# Patient Record
Sex: MALE | Race: WHITE | NOT HISPANIC OR LATINO | Employment: OTHER | URBAN - METROPOLITAN AREA
[De-identification: names, ages, dates, MRNs, and addresses within clinical notes are randomized per-mention and may not be internally consistent; named-entity substitution may affect disease eponyms.]

---

## 2017-01-29 ENCOUNTER — HOSPITAL ENCOUNTER (INPATIENT)
Facility: CLINIC | Age: 82
LOS: 3 days | Discharge: HOME | DRG: 193 | End: 2017-02-01
Attending: EMERGENCY MEDICINE | Admitting: HOSPITALIST
Payer: MEDICARE

## 2017-01-29 ENCOUNTER — APPOINTMENT (OUTPATIENT)
Dept: GENERAL RADIOLOGY | Facility: CLINIC | Age: 82
DRG: 193 | End: 2017-01-29
Attending: EMERGENCY MEDICINE
Payer: MEDICARE

## 2017-01-29 DIAGNOSIS — J18.9 PNEUMONIA DUE TO INFECTIOUS ORGANISM, UNSPECIFIED LATERALITY, UNSPECIFIED PART OF LUNG: ICD-10-CM

## 2017-01-29 DIAGNOSIS — J11.1 INFLUENZA: ICD-10-CM

## 2017-01-29 DIAGNOSIS — R09.02 HYPOXIA: ICD-10-CM

## 2017-01-29 PROBLEM — J09.X1 INFLUENZA A WITH PNEUMONIA: Status: ACTIVE | Noted: 2017-01-29

## 2017-01-29 PROBLEM — J10.1 INFLUENZA A: Status: ACTIVE | Noted: 2017-01-29

## 2017-01-29 PROBLEM — R06.02 SHORTNESS OF BREATH: Status: ACTIVE | Noted: 2017-01-29

## 2017-01-29 LAB
ANION GAP SERPL CALCULATED.3IONS-SCNC: 8 MMOL/L (ref 3–14)
BUN SERPL-MCNC: 23 MG/DL (ref 7–30)
CALCIUM SERPL-MCNC: 9 MG/DL (ref 8.5–10.1)
CHLORIDE SERPL-SCNC: 108 MMOL/L (ref 94–109)
CO2 BLDCOV-SCNC: 27 MMOL/L (ref 21–28)
CO2 SERPL-SCNC: 24 MMOL/L (ref 20–32)
CREAT SERPL-MCNC: 1.19 MG/DL (ref 0.66–1.25)
ERYTHROCYTE [DISTWIDTH] IN BLOOD BY AUTOMATED COUNT: 16.9 % (ref 10–15)
FLUAV+FLUBV AG SPEC QL: ABNORMAL
FLUAV+FLUBV AG SPEC QL: POSITIVE
GFR SERPL CREATININE-BSD FRML MDRD: 59 ML/MIN/1.7M2
GLUCOSE SERPL-MCNC: 104 MG/DL (ref 70–99)
HCT VFR BLD AUTO: 37.6 % (ref 40–53)
HGB BLD-MCNC: 11.9 G/DL (ref 13.3–17.7)
LACTATE BLD-SCNC: 1.4 MMOL/L (ref 0.7–2.1)
MCH RBC QN AUTO: 20.7 PG (ref 26.5–33)
MCHC RBC AUTO-ENTMCNC: 31.6 G/DL (ref 31.5–36.5)
MCV RBC AUTO: 65 FL (ref 78–100)
NT-PROBNP SERPL-MCNC: 4211 PG/ML (ref 0–1800)
PCO2 BLDV: 49 MM HG (ref 40–50)
PH BLDV: 7.34 PH (ref 7.32–7.43)
PLATELET # BLD AUTO: 97 10E9/L (ref 150–450)
PO2 BLDV: 43 MM HG (ref 25–47)
POTASSIUM SERPL-SCNC: 4.5 MMOL/L (ref 3.4–5.3)
RBC # BLD AUTO: 5.75 10E12/L (ref 4.4–5.9)
SAO2 % BLDV FROM PO2: 75 %
SODIUM SERPL-SCNC: 140 MMOL/L (ref 133–144)
SPECIMEN SOURCE: ABNORMAL
WBC # BLD AUTO: 5.4 10E9/L (ref 4–11)

## 2017-01-29 PROCEDURE — 85027 COMPLETE CBC AUTOMATED: CPT | Performed by: EMERGENCY MEDICINE

## 2017-01-29 PROCEDURE — 93005 ELECTROCARDIOGRAM TRACING: CPT

## 2017-01-29 PROCEDURE — 87040 BLOOD CULTURE FOR BACTERIA: CPT | Performed by: EMERGENCY MEDICINE

## 2017-01-29 PROCEDURE — 71020 XR CHEST 2 VW: CPT

## 2017-01-29 PROCEDURE — 80048 BASIC METABOLIC PNL TOTAL CA: CPT | Performed by: EMERGENCY MEDICINE

## 2017-01-29 PROCEDURE — 25000125 ZZHC RX 250: Performed by: EMERGENCY MEDICINE

## 2017-01-29 PROCEDURE — 12000000 ZZH R&B MED SURG/OB

## 2017-01-29 PROCEDURE — 25000132 ZZH RX MED GY IP 250 OP 250 PS 637: Performed by: EMERGENCY MEDICINE

## 2017-01-29 PROCEDURE — 99285 EMERGENCY DEPT VISIT HI MDM: CPT | Mod: 25

## 2017-01-29 PROCEDURE — 87804 INFLUENZA ASSAY W/OPTIC: CPT | Performed by: EMERGENCY MEDICINE

## 2017-01-29 PROCEDURE — 96374 THER/PROPH/DIAG INJ IV PUSH: CPT

## 2017-01-29 PROCEDURE — 25000132 ZZH RX MED GY IP 250 OP 250 PS 637: Performed by: HOSPITALIST

## 2017-01-29 PROCEDURE — 94640 AIRWAY INHALATION TREATMENT: CPT

## 2017-01-29 PROCEDURE — 40000274 ZZH STATISTIC RCP CONSULT EA 30 MIN

## 2017-01-29 PROCEDURE — 40000275 ZZH STATISTIC RCP TIME EA 10 MIN

## 2017-01-29 PROCEDURE — 96375 TX/PRO/DX INJ NEW DRUG ADDON: CPT

## 2017-01-29 PROCEDURE — 82803 BLOOD GASES ANY COMBINATION: CPT

## 2017-01-29 PROCEDURE — 25000128 H RX IP 250 OP 636: Performed by: EMERGENCY MEDICINE

## 2017-01-29 PROCEDURE — 83605 ASSAY OF LACTIC ACID: CPT

## 2017-01-29 PROCEDURE — 25000125 ZZHC RX 250: Performed by: HOSPITALIST

## 2017-01-29 PROCEDURE — 99223 1ST HOSP IP/OBS HIGH 75: CPT | Mod: AI | Performed by: HOSPITALIST

## 2017-01-29 PROCEDURE — 83880 ASSAY OF NATRIURETIC PEPTIDE: CPT | Performed by: EMERGENCY MEDICINE

## 2017-01-29 RX ORDER — ONDANSETRON 4 MG/1
4 TABLET, ORALLY DISINTEGRATING ORAL EVERY 6 HOURS PRN
Status: DISCONTINUED | OUTPATIENT
Start: 2017-01-29 | End: 2017-02-01 | Stop reason: HOSPADM

## 2017-01-29 RX ORDER — LISINOPRIL 20 MG/1
20 TABLET ORAL DAILY
Status: DISCONTINUED | OUTPATIENT
Start: 2017-01-30 | End: 2017-01-30

## 2017-01-29 RX ORDER — PROCHLORPERAZINE 25 MG
12.5 SUPPOSITORY, RECTAL RECTAL EVERY 12 HOURS PRN
Status: DISCONTINUED | OUTPATIENT
Start: 2017-01-29 | End: 2017-02-01 | Stop reason: HOSPADM

## 2017-01-29 RX ORDER — NALOXONE HYDROCHLORIDE 0.4 MG/ML
.1-.4 INJECTION, SOLUTION INTRAMUSCULAR; INTRAVENOUS; SUBCUTANEOUS
Status: DISCONTINUED | OUTPATIENT
Start: 2017-01-29 | End: 2017-01-30

## 2017-01-29 RX ORDER — IPRATROPIUM BROMIDE AND ALBUTEROL SULFATE 2.5; .5 MG/3ML; MG/3ML
3 SOLUTION RESPIRATORY (INHALATION) ONCE
Status: COMPLETED | OUTPATIENT
Start: 2017-01-29 | End: 2017-01-29

## 2017-01-29 RX ORDER — ONDANSETRON 2 MG/ML
4 INJECTION INTRAMUSCULAR; INTRAVENOUS EVERY 6 HOURS PRN
Status: DISCONTINUED | OUTPATIENT
Start: 2017-01-29 | End: 2017-02-01 | Stop reason: HOSPADM

## 2017-01-29 RX ORDER — PROCHLORPERAZINE MALEATE 5 MG
5 TABLET ORAL EVERY 6 HOURS PRN
Status: DISCONTINUED | OUTPATIENT
Start: 2017-01-29 | End: 2017-02-01 | Stop reason: HOSPADM

## 2017-01-29 RX ORDER — METOPROLOL TARTRATE 25 MG/1
25 TABLET, FILM COATED ORAL 2 TIMES DAILY
Status: DISCONTINUED | OUTPATIENT
Start: 2017-01-29 | End: 2017-02-01 | Stop reason: HOSPADM

## 2017-01-29 RX ORDER — OSELTAMIVIR PHOSPHATE 30 MG/1
30 CAPSULE ORAL 2 TIMES DAILY
Status: DISCONTINUED | OUTPATIENT
Start: 2017-01-30 | End: 2017-02-01 | Stop reason: HOSPADM

## 2017-01-29 RX ORDER — AMOXICILLIN 250 MG
1-2 CAPSULE ORAL 2 TIMES DAILY PRN
Status: DISCONTINUED | OUTPATIENT
Start: 2017-01-29 | End: 2017-02-01 | Stop reason: HOSPADM

## 2017-01-29 RX ORDER — BISACODYL 10 MG
10 SUPPOSITORY, RECTAL RECTAL DAILY PRN
Status: DISCONTINUED | OUTPATIENT
Start: 2017-01-29 | End: 2017-02-01 | Stop reason: HOSPADM

## 2017-01-29 RX ORDER — METHYLPREDNISOLONE SODIUM SUCCINATE 125 MG/2ML
125 INJECTION, POWDER, LYOPHILIZED, FOR SOLUTION INTRAMUSCULAR; INTRAVENOUS ONCE
Status: COMPLETED | OUTPATIENT
Start: 2017-01-29 | End: 2017-01-29

## 2017-01-29 RX ORDER — FERROUS SULFATE 325(65) MG
325 TABLET ORAL DAILY
Status: DISCONTINUED | OUTPATIENT
Start: 2017-01-30 | End: 2017-02-01 | Stop reason: HOSPADM

## 2017-01-29 RX ORDER — LEVOFLOXACIN 5 MG/ML
750 INJECTION, SOLUTION INTRAVENOUS ONCE
Status: COMPLETED | OUTPATIENT
Start: 2017-01-29 | End: 2017-01-29

## 2017-01-29 RX ORDER — BUDESONIDE 0.5 MG/2ML
0.5 INHALANT ORAL 2 TIMES DAILY
Status: DISCONTINUED | OUTPATIENT
Start: 2017-01-29 | End: 2017-02-01 | Stop reason: HOSPADM

## 2017-01-29 RX ORDER — IPRATROPIUM BROMIDE AND ALBUTEROL SULFATE 2.5; .5 MG/3ML; MG/3ML
3 SOLUTION RESPIRATORY (INHALATION)
Status: DISCONTINUED | OUTPATIENT
Start: 2017-01-29 | End: 2017-02-01 | Stop reason: HOSPADM

## 2017-01-29 RX ORDER — ASPIRIN 325 MG
325 TABLET ORAL DAILY
Status: DISCONTINUED | OUTPATIENT
Start: 2017-01-30 | End: 2017-02-01 | Stop reason: HOSPADM

## 2017-01-29 RX ORDER — LEVOFLOXACIN 5 MG/ML
500 INJECTION, SOLUTION INTRAVENOUS EVERY 24 HOURS
Status: DISCONTINUED | OUTPATIENT
Start: 2017-01-30 | End: 2017-01-30

## 2017-01-29 RX ORDER — OSELTAMIVIR PHOSPHATE 75 MG/1
75 CAPSULE ORAL ONCE
Status: COMPLETED | OUTPATIENT
Start: 2017-01-29 | End: 2017-01-29

## 2017-01-29 RX ORDER — SIMVASTATIN 40 MG
40 TABLET ORAL AT BEDTIME
Status: DISCONTINUED | OUTPATIENT
Start: 2017-01-29 | End: 2017-02-01 | Stop reason: HOSPADM

## 2017-01-29 RX ORDER — HYDRALAZINE HYDROCHLORIDE 20 MG/ML
10 INJECTION INTRAMUSCULAR; INTRAVENOUS EVERY 4 HOURS PRN
Status: DISCONTINUED | OUTPATIENT
Start: 2017-01-29 | End: 2017-02-01 | Stop reason: HOSPADM

## 2017-01-29 RX ORDER — ACETAMINOPHEN 500 MG
1000 TABLET ORAL ONCE
Status: COMPLETED | OUTPATIENT
Start: 2017-01-29 | End: 2017-01-29

## 2017-01-29 RX ORDER — MONTELUKAST SODIUM 10 MG/1
10 TABLET ORAL DAILY
Status: DISCONTINUED | OUTPATIENT
Start: 2017-01-30 | End: 2017-02-01 | Stop reason: HOSPADM

## 2017-01-29 RX ORDER — NALOXONE HYDROCHLORIDE 0.4 MG/ML
.1-.4 INJECTION, SOLUTION INTRAMUSCULAR; INTRAVENOUS; SUBCUTANEOUS
Status: DISCONTINUED | OUTPATIENT
Start: 2017-01-29 | End: 2017-02-01 | Stop reason: HOSPADM

## 2017-01-29 RX ORDER — ACETAMINOPHEN 325 MG/1
650 TABLET ORAL EVERY 4 HOURS PRN
Status: DISCONTINUED | OUTPATIENT
Start: 2017-01-29 | End: 2017-02-01 | Stop reason: HOSPADM

## 2017-01-29 RX ORDER — IPRATROPIUM BROMIDE AND ALBUTEROL SULFATE 2.5; .5 MG/3ML; MG/3ML
3 SOLUTION RESPIRATORY (INHALATION)
Status: COMPLETED | OUTPATIENT
Start: 2017-01-29 | End: 2017-01-31

## 2017-01-29 RX ADMIN — SODIUM CHLORIDE 1000 ML: 9 INJECTION, SOLUTION INTRAVENOUS at 18:35

## 2017-01-29 RX ADMIN — OSELTAMIVIR PHOSPHATE 75 MG: 75 CAPSULE ORAL at 19:25

## 2017-01-29 RX ADMIN — ACETAMINOPHEN 1000 MG: 500 TABLET, COATED ORAL at 18:35

## 2017-01-29 RX ADMIN — METHYLPREDNISOLONE SODIUM SUCCINATE 125 MG: 125 INJECTION, POWDER, FOR SOLUTION INTRAMUSCULAR; INTRAVENOUS at 18:09

## 2017-01-29 RX ADMIN — LEVOFLOXACIN 750 MG: 5 INJECTION, SOLUTION INTRAVENOUS at 19:24

## 2017-01-29 RX ADMIN — IPRATROPIUM BROMIDE AND ALBUTEROL SULFATE 3 ML: .5; 3 SOLUTION RESPIRATORY (INHALATION) at 20:40

## 2017-01-29 RX ADMIN — METOPROLOL TARTRATE 25 MG: 25 TABLET, FILM COATED ORAL at 22:13

## 2017-01-29 RX ADMIN — IPRATROPIUM BROMIDE AND ALBUTEROL SULFATE 3 ML: .5; 3 SOLUTION RESPIRATORY (INHALATION) at 18:02

## 2017-01-29 RX ADMIN — IPRATROPIUM BROMIDE AND ALBUTEROL SULFATE 3 ML: .5; 3 SOLUTION RESPIRATORY (INHALATION) at 17:34

## 2017-01-29 RX ADMIN — IPRATROPIUM BROMIDE AND ALBUTEROL SULFATE 3 ML: .5; 3 SOLUTION RESPIRATORY (INHALATION) at 18:50

## 2017-01-29 RX ADMIN — SIMVASTATIN 40 MG: 40 TABLET, FILM COATED ORAL at 22:13

## 2017-01-29 ASSESSMENT — ACTIVITIES OF DAILY LIVING (ADL)
COGNITION: 0 - NO COGNITION ISSUES REPORTED
TOILETING: 0-->INDEPENDENT
RETIRED_COMMUNICATION: 2-->DIFFICULTY UNDERSTANDING (NOT RELATED TO LANGUAGE BARRIER)
RETIRED_EATING: 0-->INDEPENDENT
SWALLOWING: 0-->SWALLOWS FOODS/LIQUIDS WITHOUT DIFFICULTY
DRESS: 0-->INDEPENDENT
AMBULATION: 0-->INDEPENDENT
BATHING: 0-->INDEPENDENT
FALL_HISTORY_WITHIN_LAST_SIX_MONTHS: NO
TRANSFERRING: 0-->INDEPENDENT

## 2017-01-29 ASSESSMENT — ENCOUNTER SYMPTOMS
RHINORRHEA: 1
WHEEZING: 1
SHORTNESS OF BREATH: 1
COUGH: 1
VOMITING: 0
HEADACHES: 1
NAUSEA: 0
FEVER: 0

## 2017-01-29 NOTE — ED NOTES
Pt started having problems breathing today, pt used neb without relief.   Decreased breath sound on left, course on right

## 2017-01-29 NOTE — IP AVS SNAPSHOT
[facility]    [address] [place] [address]    Phone:  [phone]                                       After Visit Summary   1/29/2017    Shiraz Ingram    MRN: [id number]           After Visit Summary Signature Page     I have received my discharge instructions, and my questions have been answered. I have discussed any challenges I see with this plan with the nurse or doctor.    ..........................................................................................................................................  Patient/Patient Representative Signature      ..........................................................................................................................................  Patient Representative Print Name and Relationship to Patient    ..................................................               ................................................  Date                                            Time    ..........................................................................................................................................  Reviewed by Signature/Title    ...................................................              ..............................................  Date                                                            Time

## 2017-01-29 NOTE — ED PROVIDER NOTES
History     Chief Complaint:  Shortness of Breath     HPI   Shiraz Ingram is a 82 year old male with a history of asthma who presents with shortness of breath. The patient and his son report that the patient developed a cough and runny nose two days ago. Around 1500, the patient began shaking, wheezing, and became increasingly short of breath. Because of his previous history of bronchitis, he presented to the ED for evaluation. While in the ED, he reports using breathing treatments at 0700, 1200, and 1500. He denies being on current steroids for his asthma, and he also denies fever, vomiting, chest pain, or a history of blood clots.       Allergies:  NKDA      Medications:    Norco  Zocor  Prinivil  Lopressor  Iron  Proair  Pulmicort   Singulair  Duoneb  Albuterol   Pulmicort  Combivent   Caltrate      Past Medical History:    Unspecified hearing loss  Coronary atherosclerosis  Benign neoplasm of colon  Hyperlipidemia   Moderate persistent asthma  Localized osteoarthrosis not specified whether primary or secondary, lower leg  Allergic rhinitis, cause unspecified  Anemia  Aortic stenosis, mild  Hypertension  CKD stage 3     Past Surgical History:    Coronary artery bypass  Laparoscopy cholecystectomy for biliary sludge  Esophagoscopy diagnostic   Colonoscopy thru stoma w biopsy/cautery polyp (one adenomatous polyp)  Herniorrhaphy inguinal      Family History:  Mother: Breast cancer,  of pneumonia  Father: CAD  Son: cerebrovascular disease  Brother: Heart disease    Social History:  Relationship status:   The patient has never smoked.   The patient does not drink alcohol.   The patient presents with his son.     Review of Systems   Constitutional: Negative for fever.   HENT: Positive for congestion and rhinorrhea.    Respiratory: Positive for cough, shortness of breath and wheezing.    Cardiovascular: Negative for chest pain.   Gastrointestinal: Negative for nausea and vomiting.   Neurological: Positive  "for headaches.   All other systems reviewed and are negative.      Physical Exam     Patient Vitals for the past 24 hrs:   BP Temp Temp src Pulse Resp SpO2 Height Weight   01/29/17 1750 - 101.8  F (38.8  C) Oral - - - - -   01/29/17 1730 (!) 172/97 mmHg - - - - 92 % - -   01/29/17 1722 - 99.4  F (37.4  C) Oral - - - - -   01/29/17 1720 185/82 mmHg - - 85 26 (!) 84 % 1.727 m (5' 8\") 76.204 kg (168 lb)       Physical Exam   General: Resting comfortably on the gurney  Eyes:  The pupils are equal and round  ENT:    Atraumatic  Neck:  Normal range of motion  CV:  Regular rate and rhythm    Skin warm and well perfused   Resp:  Coughing    Expiratory wheezing bilaterally    On oxygen via nasal canula     No rales  GI:  Abdomen is soft, there is no rigidity    No distension    No rebound tenderness    No abdominal tenderness   MS:  Normal muscular tone    No leg swelling  Skin:  No rash or acute skin lesions noted  Neuro:   Awake, alert.      Speech is normal and fluent.    Face is symmetric.     Moves all extremities  Psych:  Normal affect.  Appropriate interactions.    Emergency Department Course     ECG @ 1815  Indication: Shortness of breath  Rate 90 bpm.   MD interval 218 ms.   QRS duration 136 ms.   QT/QTc 400/489 ms.   P-R-T axes -12.  Notes: Sinus rhythm with 1st degree AV Block with fusion complexes and premature atrial complexes  Time read 1830    Imaging:  Radiographic findings were communicated with the patient who voiced understanding of the findings.    Chest XR, PA and LAT, per radiology:   IMPRESSION: Median sternotomy changes again noted.    There is new patchy airspace opacity in the left lung base that may  represent atelectasis or pneumonia. There are no airspace opacities in  the right lung. There is increased prominence of the perihilar  pulmonary vasculature and increased interstitial prominence in both  lungs that may represent congestive failure with pulmonary edema.  There is no pneumothorax. There " is no definite pleural effusion. Heart  size is mildly enlarged.    Laboratory:  Blood culture 1: Pending  Blood culture 2: Pending  CBC: WBC 5.4 (WNL) HGB 11.9 (L) PLT 97 (L) HCT 37.6 (L) MCV 65 (L) MCH 20.7 (L) RDW 16.9 (H)  BMP: Cr 1.19 (WNL) Glucose 104 (H) GFR 59 (L) Rest WNL  Influenza A/B antigen: A: Positive B:Negative   ISTAT gases lactate anderson POCT: pH 7.34 (WNL) pCO2 49 (WNL) p02 43 (WNL) Bicarbonate 27 (WNL) Lactate 1.4 (WNL) Rest WNL  BNP: 4211 (H)    Interventions:  1734: Duoneb, 3 mLs, Nebulizer  1802: Duoneb, 3 mLs, Nebulizer  1809: Solu-Medrol, 125 mg, IV  1835: Normal Saline, 1000 mL, IV   1835: Tylenol, 1,000 mg, PO  1924: Levaquin, 750 mg, IV  1925: Tamiflu, 75 mg, PO    ED Course:  The patient arrived in triage where his vitals were measured and recorded. The patient was then escorted back to the emergency department.  Nursing notes and past medical history reviewed.   I performed a physical examination of the patient as documented above.  I explained the plan with the patient who consents to this.   The patient received the above interventions.   Blood was drawn and sent to the laboratory for testing, see above results.   A peripheral IV was established.   1838: Patient was rechecked and is feeling better after his duonebs  1907: Discussed the patient with Dr. Ruiz from the hospitalist service, while he was in the ED.  I personally reviewed the laboratory and imaging results with the Patient and answered all related questions prior to admitting.   Findings and plan explained to the Patient. Patient discharged home with instructions regarding supportive care, medications, and reasons to return. The importance of close follow-up was reviewed.     Impression & Plan      Medical Decision Making:  Shiraz Ingram is an 82 year old male who presented to the emergency department with shortness of breath. Family members are concerned for asthma exacerbation. He is hypoxic to 84% on room air on arrival.  Placed on oxygen. Initially not febrile but then developed fever while in the emergency department. Initially started treating him for asthma exacerbation given the expiratory wheezing that I heard bilaterally and he was given solu-medrol and duonebs. The wheezing resolved after treatment, and he was feeling much better. When he developed the fever, pursued additional laboratory values such as blood cultures and lactate. Chest x-ray shows pneumonia as well as possible pulmonary edema. His BNP is also slightly elevated at 4200. No leukocytosis. Platelet count is mildly low at 97. Influenza A is also positive. Given the pneumonia and influenza, gave him Levaquin and Tamiflu. Also gave him limited IV fluids given the possible pulmonary edema. Patient will require hospitalization given the hypoxia. Discussed patient with the hospitalist who agreed to accept the patient.     Diagnosis:    ICD-10-CM    1. Influenza J11.1                  Disposition:   Admit to a medical bed under the care of Dr. Ruiz.         Anitra RUTH, am serving as a scribe on 1/29/2017 at 5:30 PM to personally document services performed by Layla Sharma MD, based on my observations and the provider's statements to me.          Layla Sharma MD  01/30/17 0100

## 2017-01-29 NOTE — IP AVS SNAPSHOT
MRN:[id number]                      After Visit Summary   1/29/2017    Shiraz Ingram    MRN: [id number]           Thank you!     Thank you for choosing [facility] for your care. Our goal is always to provide you with excellent care. Hearing back from our patients is one way we can continue to improve our services. Please take a few minutes to complete the written survey that you may receive in the mail after you visit with us. Thank you!        Patient Information     Date Of Birth          11/30/1934        About your hospital stay     You were admitted on:  January 29, 2017 You last received care in the:  [place] [address] [facility]    You were discharged on:  February 1, 2017        Reason for your hospital stay       This is an 82 year old male admitted with pneumonia and influenza.                  Who to Call     For medical emergencies, please call 911.  For non-urgent questions about your medical care, please call your primary care provider or clinic, [phone]          Attending Provider     Provider    Layla Sharma MD Rauniyar, Amit Kumar, MD       Primary Care Provider Office Phone # Fax #    Dany Evgeny Rodriguez [phone]       [place] [address]        After Care Instructions     Activity       Your activity upon discharge: activity as tolerated            Diet       Follow this diet upon discharge: Orders Placed This Encounter  2 Gram Sodium Diet                  Follow-up Appointments     Follow-up and recommended labs and tests       Follow up with primary care provider within 7 days.  The following labs/tests are recommended: BMP.                  Your next 10 appointments already scheduled     Feb 01, 2017  4:00 PM   [facility] Inpatient with [facility]    Services Department ([facility])    [address] [place] [address]  "[facility] [address]               Jun 13, 2017  8:00 AM   Office Visit with Dany Rodriguez MD   [facility] ([facility])    [address] [facility] [address] [phone]           Bring a current list of meds and any records pertaining to this visit.  For Physicals, please bring immunization records and any forms needing to be filled out.  Please arrive 10 minutes early to complete paperwork.              General Recommendations To Control Heart Failure When You Get Home     Instructions To Patients and Families: Please read and check off each of these important instructions as you do them when you get home.           Weight and symptoms      ___ Put a scale in your bathroom  ___ Post a weight chart or calendar next to the scale  ___Weigh yourself every day as soon as you you get up in the morning. You should only be wearing your pajamas. Write your weight on the chart/calendar.  ___ Bring your weight chart/calendar with you to all appointments    ___Call your doctor if you gain 2 pounds in 1 day or 5 pounds in 1 week from your \"dry\" weight (baseline weight). Also call your doctor if you have shortness of breath that gets worse over time, leg swelling or fatigue.         Medicines and diet     ___ Make sure to take your medicines as prescribed.    ___Bring a current list of your medicines and all of your medicine bottles with you to all appointments.    ___ Limit fluids if you still have swelling or shortness of breath, or if your doctor tells you to do so.  ___ Eat less than 2000 mg of sodium (salt) every day. Read food labels, and do not add salt to meals.   ___ Heart healthy diet with low fat and low cholesterol          Activity and suggested lifestyle changes    ___ Stay active. Talk to your doctor about an exercise program that is safe for your heart.    ___ Stop smoking. Reduce alcohol use.      ___ Lose weight if you are overweight. " Extra weight puts a lot of stress on the heart.          Control for Leg Swelling   ___ Keep your legs elevated (raised) as needed for swelling. If swelling is uncomfortable or elevation doesn t help, ask your doctor about using ACE wrap or Jobst stockings.          Follow-up appointments   ___ Make a [facility] appointment with a basic metabolic panel lab draw 3 to 5 days after you leave the hospital. Call one of the following locations:   [facility]  [phone],  [facility] [phone],  [facility]  [phone].     ___ Make sure to take your medications as prescribed and bring an accurate list of your medications and your weight chart/calendar to your follow up appointment at the [facility] for continued education and adjustments          What is the [facility] clinic?    The [facility] (Cardiomyopathy, Optimization, Rehabilitation, Education) Clinic is a heart failure specialty clinic within the [facility]. At [facility] you will work with nurse practitioners to carefully adjust medicines, get education and learn who and when to call if symptoms appear. [facility] nurses specialize in helping you:    better understand your disease.    slow the progress of your disease.    improve the length and quality of your life.    detect future heart problems before they become life threatening.    avoid hospital stays.            Pending Results     Date and Time Order Name Status Description    1/29/2017 1812 Blood culture Preliminary     1/29/2017 1807 Blood culture Preliminary             Statement of Approval     Ordered          02/01/17 0919  I have reviewed and agree with all the recommendations and orders detailed in this document.   EFFECTIVE NOW     Approved and electronically signed by:  Aniket Monique MD             Admission Information        Provider Department Dept Phone     "2017 Riki Ruiz MD [address] [facility] [phone]      Your Vitals Were     Blood Pressure Pulse Temperature    138/78 mmHg 83 98.4  F (36.9  C) (Oral)    Respirations Height Weight    18 1.727 m (5' 8\") 76.204 kg (168 lb)    BMI (Body Mass Index) Pulse Oximetry       25.55 kg/m2 94%       MyChart Information     [employer] lets you send messages to your doctor, view your test results, renew your prescriptions, schedule appointments and more. To sign up, go to [email or web address] . Click on \"Log in\" on the left side of the screen, which will take you to the Welcome page. Then click on \"Sign up Now\" on the right side of the page.     You will be asked to enter the access code listed below, as well as some personal information. Please follow the directions to create your username and password.     Your access code is: [email or web address]  Expires: 2017  3:13 PM     Your access code will  in 90 days. If you need help or a new code, please call your [facility] clinic or [phone].        Care EveryWhere ID     This is your Care EveryWhere ID. This could be used by other organizations to access your [facility] medical records  [id number]           Review of your medicines      START taking        Dose / Directions    levofloxacin 500 MG tablet   Commonly known as:  LEVAQUIN   Used for:  Pneumonia due to infectious organism, unspecified laterality, unspecified part of lung        Dose:  500 mg   Take 1 tablet (500 mg) by mouth daily for 7 days   Quantity:  4 tablet   Refills:  0       oseltamivir 30 MG capsule   Commonly known as:  TAMIFLU   Indication:  Flu   Used for:  Influenza        Dose:  30 mg   Take 1 capsule (30 mg) by mouth 2 times daily   Quantity:  4 capsule   Refills:  0         CONTINUE these medicines which have NOT CHANGED        Dose / Directions    albuterol 108 (90 BASE) MCG/ACT Inhaler   Commonly known as:  albuterol   Used for:  Moderate persistent asthma, uncomplicated        " INHALE 1 TO 2 PUFFS EVERY 4 TO 6 HOURS AS NEEDED   Quantity:  1 Inhaler   Refills:  11       ASPIRIN PO        Dose:  325 mg   Take 325 mg by mouth daily   Refills:  0       budesonide 0.5 MG/2ML neb solution   Commonly known as:  PULMICORT   Used for:  Moderate persistent asthma        Dose:  0.5 mg   Take 2 mLs (0.5 mg) by nebulization 2 times daily   Quantity:  120 mL   Refills:  0       CALTRATE 600 + D 600-200 MG-IU Tabs   Used for:  Osteoporosis, unspecified        TAKE 1 TABLET BY MOUTH DAILY   Quantity:  30   Refills:  0       FERROUS SULFATE PO        Dose:  325 mg   Take 325 mg by mouth daily   Refills:  0       HYDROcodone-acetaminophen 5-325 MG per tablet   Commonly known as:  NORCO   Used for:  Right inguinal hernia        Dose:  1-2 tablet   Take 1-2 tablets by mouth every 4 hours as needed for other (Moderate to Severe Pain)   Quantity:  20 tablet   Refills:  0       ipratropium - albuterol 0.5 mg/2.5 mg/3 mL 0.5-2.5 (3) MG/3ML neb solution   Commonly known as:  DUONEB   Used for:  Moderate persistent asthma, uncomplicated        Dose:  3 mL   Inhale 1 vial (3 mLs) into the lungs 4 times daily   Quantity:  360 mL   Refills:  11       lisinopril 20 MG tablet   Commonly known as:  PRINIVIL/ZESTRIL   Used for:  Essential hypertension with goal blood pressure less than 140/90        Dose:  20 mg   Take 1 tablet (20 mg) by mouth daily   Quantity:  90 tablet   Refills:  2       metoprolol 50 MG tablet   Commonly known as:  LOPRESSOR   Used for:  Essential hypertension with goal blood pressure less than 140/90        Dose:  25 mg   Take 0.5 tablets (25 mg) by mouth 2 times daily   Quantity:  90 tablet   Refills:  3       montelukast 10 MG tablet   Commonly known as:  SINGULAIR   Used for:  Moderate persistent asthma        Dose:  1 tablet   Take 1 tablet (10 mg) by mouth daily   Quantity:  90 tablet   Refills:  2       MULTIVITAMIN PO        Dose:  1 tablet   Take 1 tablet by mouth daily   Refills:  0        simvastatin 40 MG tablet   Commonly known as:  ZOCOR   Used for:  Hyperlipidemia with target LDL less than 100        Dose:  40 mg   Take 1 tablet (40 mg) by mouth At Bedtime   Quantity:  90 tablet   Refills:  2            Where to get your medicines      These medications were sent to [facility] [place], [place] - [address]  [address], [place] [address]     Phone:  [phone]    - levofloxacin 500 MG tablet  - oseltamivir 30 MG capsule             Protect others around you: Learn how to safely use, store and throw away your medicines at www.disposemymeds.org.             Medication List: This is a list of all your medications and when to take them. Check marks below indicate your daily home schedule. Keep this list as a reference.      Medications           Morning Afternoon Evening Bedtime As Needed    albuterol 108 (90 BASE) MCG/ACT Inhaler   Commonly known as:  albuterol   INHALE 1 TO 2 PUFFS EVERY 4 TO 6 HOURS AS NEEDED                                   ASPIRIN PO   Take 325 mg by mouth daily   Last time this was given:  325 mg on 2/1/2017  9:23 AM   Next Dose Due:  Take tomorrow, Thursday 2/02/17                                   budesonide 0.5 MG/2ML neb solution   Commonly known as:  PULMICORT   Take 2 mLs (0.5 mg) by nebulization 2 times daily   Last time this was given:  0.5 mg on 2/1/2017  8:22 AM   Next Dose Due:  Take tonight, Wednesday 2/01/17                                      CALTRATE 600 + D 600-200 MG-IU Tabs   TAKE 1 TABLET BY MOUTH DAILY   Next Dose Due:  Take tomorrow, Thursday 2/02/17                                   FERROUS SULFATE PO   Take 325 mg by mouth daily   Last time this was given:  325 mg on 2/1/2017  9:23 AM   Next Dose Due:  Take tomorrow, Thursday 2/02/17                                   HYDROcodone-acetaminophen 5-325 MG per tablet   Commonly known as:  NORCO   Take 1-2 tablets by mouth every 4 hours as needed for other (Moderate to Severe  Pain)                                   ipratropium - albuterol 0.5 mg/2.5 mg/3 mL 0.5-2.5 (3) MG/3ML neb solution   Commonly known as:  DUONEB   Inhale 1 vial (3 mLs) into the lungs 4 times daily   Last time this was given:  3 mLs on 2/1/2017  8:25 AM   Next Dose Due:  Take today Wednesday 2/01/17, THREE more times                                            levofloxacin 500 MG tablet   Commonly known as:  LEVAQUIN   Take 1 tablet (500 mg) by mouth daily for 7 days   Next Dose Due:  Take today, Wednesday 2/01/17                                   lisinopril 20 MG tablet   Commonly known as:  PRINIVIL/ZESTRIL   Take 1 tablet (20 mg) by mouth daily   Last time this was given:  20 mg on 1/31/2017  8:28 AM   Next Dose Due:  Take today, Wednesday 2/01/17                                   metoprolol 50 MG tablet   Commonly known as:  LOPRESSOR   Take 0.5 tablets (25 mg) by mouth 2 times daily   Last time this was given:  25 mg on 2/1/2017  9:23 AM   Next Dose Due:  Take tonight, Wednesday 2/01/17                                      montelukast 10 MG tablet   Commonly known as:  SINGULAIR   Take 1 tablet (10 mg) by mouth daily   Last time this was given:  10 mg on 2/1/2017  9:23 AM   Next Dose Due:  Take tomorrow, Thursday 2/02/17                                   MULTIVITAMIN PO   Take 1 tablet by mouth daily   Next Dose Due:  Take tomorrow, Thursday 2/02/17                                   oseltamivir 30 MG capsule   Commonly known as:  TAMIFLU   Take 1 capsule (30 mg) by mouth 2 times daily   Last time this was given:  30 mg on 2/1/2017  9:23 AM   Next Dose Due:  Take tonight, Wednesday 2/01/17                                      simvastatin 40 MG tablet   Commonly known as:  ZOCOR   Take 1 tablet (40 mg) by mouth At Bedtime   Last time this was given:  40 mg on 1/31/2017  9:05 PM   Next Dose Due:  Take tonight, Wednesday 2/01/17                                             More Information        Flu Vaccines for  Adults   The flu (influenza) is caused by a virus that is easily spread. A flu vaccine protects you and others from the flu. It s best to get a flu shot each fall, as soon as the vaccine is available in your area. You can get it at your health care provider s office or a health clinic. Drugstores, senior centers, and workplaces often offer flu shots, too. If you want to know if your provider has the flu vaccine available, or if you have other questions, ask your healthcare provider.    Flu facts    The flu shot will not give you the flu.    The flu can be dangerous--even life-threatening. Every year, about 36,000 people die of complications from the flu.    The flu is caused by a virus. It can t be treated with antibiotics.    Influenza is not the same as  stomach flu,  the 24-hour bug that causes vomiting and diarrhea. This is most likely due to a GI (gastrointestinal) infection--not the flu.    You need to get a flu shot each year.   Flu symptoms  Flu symptoms tend to come on quickly. Fever, headache, fatigue, cough, sore throat, runny nose, and muscle aches are symptoms of the flu. Upset stomach and vomiting are not common for adults. Some symptoms, such as fatigue and cough, may last a few weeks.  How a flu shot protects you  There are many strains (types) of the flu virus. Medical experts predict which strains are most likely to make people sick each year. Flu shots are made from these strains. When you get a flu vaccine, inactivated ( killed ) or very mild flu viruses are injected into your body or sprayed into your nose. These cannot give you the flu. But they do prompt your body to make antibodies to fight these flu strains. If you re exposed to the same strains later in the flu season, the antibodies will fight off the germs.  Recommendations for the flu vaccine  The CDC recommends that infants over the age of 6 months and all children and adults should get flu shots every year.  Some people are at an  increased risk of developing serious complications from the flu. It is extremely important that these people get the vaccine. They include those with:    Long-term heart and lung conditions    Other serious medical conditions    Endocrine disorders, like diabetes    Kidney or liver disorders    Weakened immune systems from disease of medical treatment; for example, those with HIV or AIDS or taking long-term steroids or medications to treat cancer    Blood disorders, such as sickle cell disease  It is also very important that others that have an increased risk of being exposed to the flu or are around people with increased risk of complications get the vaccine. They are:    Health care providers and other staff that provide care in hospitals, nursing homes, home health, and other facilities    Household members, including children, of people in high-risk groups  Types of flu vaccines  The flu vaccine is available as a shot and as a nasal spray. Your health care provider will determine which vaccine is right for you.    The shot is available in a few different forms. There is a high-dose vaccine for those over 65 and a vaccine for those with egg allergies. It is safe for most people. Talk with your provider if you have had:    A severe allergic reaction to a previous flu vaccine    Guillain-Tishomingo syndrome (a severe paralyzing condition)    The nasal spray is recommended for people from 2 to 49 years old. It should not be given to adults who:    Are pregnant    Have weakened immune systems    Have egg allergies    Will be in close contact with someone with a weakened immune system    Have taken antiviral medication in the past 2 days    1456-2394 [employer]. [address] [place] [address]. All rights reserved. This information is not intended as a substitute for professional medical care. Always follow your healthcare professional's instructions.                Getting a Flu Vaccination  The  flu (influenza) is caused by a virus that is easily spread. A flu vaccine is your best chance to avoid the flu. The vaccine is given in the form of a shot (injection) or a nasal spray. It s best to get vaccinated each year when the flu vaccine is available in your area. This can be done at your health care provider s office or a health clinic. Drugstores, senior centers, and workplaces often offer flu vaccinations, too. If you want to know when the vaccine is available or if you have questions about getting vaccinated, ask your health care provider.  Flu facts    The flu vaccine will not give you the flu.    The flu is caused by a virus. It can t be treated with antibiotics.    The flu can be life-threatening, especially for people in high-risk groups.    Influenza is not the same as  stomach flu,  the 24-hour bug that causes vomiting and diarrhea. This is most likely due to a GI (gastrointestinal) infection--not the flu.   Flu symptoms  Flu symptoms tend to come on quickly. Fever, headache, fatigue, cough, sore throat, runny nose, and muscle aches are symptoms of the flu. Children may have upset stomach or vomiting, but adults usually don t. Some symptoms, such as fatigue and cough, can last a few weeks.  How a flu vaccine protects you    There are many strains (types) of flu viruses. Medical experts predict which strains are most likely to make people sick each year. Flu vaccines are made from these strains. With the shot, inactivated ( killed ) flu viruses are injected into your body. With the nasal spray, live and weakened viruses are sprayed into your nose. The viruses in both vaccines cannot make you sick. But they do prompt the body to make antibodies to fight these flu strains. If you re exposed to the same strains later in the flu season, the antibodies will fight off the virus. Your health care provider can tell you which type of flu vaccine is right for you.  Who should get the flu vaccination?  The  Centers for Disease Control and Prevention (CDC) recommends that infants over the age of 6 months and all children and adults should get a flu shot every year.  Some people are at an increased risk of developing serious complications from the flu. It's extremely important that these people get the vaccine. They include those with:    Long-term heart and lung conditions    Other serious medical conditions:    Endocrine disorders, like diabetes    Kidney or liver disorders    Weak immune system from disease or medical treatment, for example those with HIV or AIDS or those taking long-term steroids or medications to treat cancer    Blood disorders, such as sickle cell disease  It is also very important that others that have an increased risk of being exposed to the flu or are around people with increased risk of complications get the vaccine. They are:    Health care providers and other staff that provide care in hospitals, nursing homes, home health, and other facilities    Household members, including children of people in high-risk groups  Types of flu vaccines  The flu vaccine is available as a shot and as a nasal spray. Your health care provider will recommend the vaccine that is best for you.    The shot is available in a few different forms. There is a high-dose vaccine for those over age 65 and a vaccine for those with egg allergies. It's safe for most people. Talk with your provider if you have had:    A severe allergic reaction to a previous flu vaccine    Guillain-barre syndrome (a severe paralyzing condition)    The nasal spray is recommended for people from 2 to 49 years of age. It should not be given to adults who:    Are pregnant    Have weak immune systems    Have egg allergies    Will be in close contact with someone with a weak immune system    Have taken antiviral medication in the past 2 days    1144-7241 [employer]. [address] [place] [address], [place] [address]. All rights reserved. This  information is not intended as a substitute for professional medical care. Always follow your healthcare professional's instructions.                Influenza  Influenza ( the flu ) is an infection that affects your respiratory tract (the mouth, nose, and lungs, and the passages between them). Unlike a cold, the flu can make you very ill. And it can lead to pneumonia, a serious lung infection. For some people, especially older adults, young children, and people with certain chronic conditions, the flu can have serious complications and even be fatal.  What Are the Risk Factors for the Flu?     Viruses that cause influenza spread through the air in droplets when someone who has the flu coughs, sneezes, laughs, or talks.   Anyone can get the flu. But you re more likely to become infected if you:    Have a weakened immune system.    Work in a health care setting where you may be exposed to flu germs.    Live or work with someone who has the flu.    Haven t received an annual flu shot.  How Does the Flu Spread?  The flu is caused by viruses. The viruses spread through the air in droplets when someone who has the flu coughs, sneezes, laughs, or talks. You can become infected when you inhale these viruses directly. You can also become infected when you touch a surface on which the droplets have landed and then transfer the germs to your eyes, nose, or mouth. Touching used tissues, or sharing utensils, drinking glasses, or a toothbrush with an infected person can expose you to flu viruses, too.  What Are the Symptoms of the Flu?  Flu symptoms tend to come on quickly and may last a few days to a few weeks. They include:    Fever usually higher than 101 F  (38.3 C) and chills    Sore throat and headache    Dry cough    Runny nose    Tiredness and weakness    Muscle aches  Factors That Can Make Flu Worse  For some people, the flu can be very serious. The risk of complications is greater for:    Children under age  5.    Adults 65 years of age and older.    People with a chronic illness, such as diabetes or heart, kidney, or lung disease.    People who live in a nursing home or long-term care facility.   How Is the Flu Treated?  Influenza usually improves after 7 days or so. In some cases, your health care provider may prescribe an antiviral medication. This may help you get well sooner. For the medication to help, you need to take it as soon as possible (ideally within 48 hours) after your symptoms start. If you develop pneumonia or other serious illness, hospital care may be needed.  Easing Flu Symptoms    Drink lots of fluids such as water, juice, and warm soup. A good rule is to drink enough so that you urinate your normal amount.    Get plenty of rest.    Ask your health care provider what to take for fever and pain.    Call your provider if your fever rises over 101 F (38.3 C) or you become dizzy, lightheaded, or short of breath.  Taking Steps to Protect Others    Wash your hands often, especially after coughing or sneezing. Or, clean your hands with an alcohol-based hand  containing at least 60 percent alcohol.    Cough or sneeze into a tissue. Then throw the tissue away and wash your hands. If you don t have a tissue, cough and sneeze into the crook of your elbow.    Stay home until at least 24 hours after you no longer have a fever or chills. Be sure the fever isn t being hidden by fever-reducing medication.    Don t share food, utensils, drinking glasses, or a toothbrush with others.    Ask your health care provider if others in your household should receive antiviral medication to help them avoid infection.  How Can the Flu Be Prevented?    One of the best ways to avoid the flu is to get a flu vaccination each year. Viruses that cause the flu change from year to year. For that reason, doctors recommend getting the flu vaccine each year, as soon as it's available in your area. The vaccine may be given as a  shot or as a nasal spray. Your health care provider can tell you which vaccine is right for you.    Wash your hands often. Frequent handwashing is a proven way to help prevent infection.    Carry an alcohol-based hand gel containing at least 60 percent alcohol. Use it when you don t have access to soap and water. Then wash your hands as soon as you can.    Avoid touching your eyes, nose, and mouth.    At home and work, clean phones, computer keyboards, and toys often with disinfectant wipes.    If possible, avoid close contact with others who have the flu or symptoms of the flu.  Handwashing Tips  Handwashing is one of the best ways to prevent many common infections. If you re caring for or visiting someone with the flu, wash your hands each time you enter and leave the room. Follow these steps:    Use warm water and plenty of soap. Rub your hands together well.    Clean the whole hand, under your nails, between your fingers, and up the wrists.    Wash for at least 15 seconds.    Rinse, letting the water run down your fingers, not up your wrists.    Dry your hands well. Use a paper towel to turn off the faucet and open the door.  Using Alcohol-Based Hand   Alcohol-based hand  are also a good choice. Use them when you don t have access to soap and water. Follow these steps:    Squeeze about a tablespoon of gel into the palm of one hand.    Rub your hands together briskly, cleaning the backs of your hands, the palms, between your fingers, and up the wrists.    Rub until the gel is gone and your hands are completely dry.  Preventing Influenza in Healthcare Settings  The flu is a special concern for people in hospitals and long-term care facilities. To help prevent the spread of flu, many hospitals and nursing homes take these steps:    Health care providers wash their hands or use an alcohol-based hand  before and after treating each patient.    People with the flu have private rooms and  bathrooms or share a room with someone with the same infection.    High-risk patients who don t have the flu are encouraged to get the flu and pneumonia vaccines.    All health care workers are encouraged or required to get flu shots.        7443-7046 [employer]. [address] [place] [address]. All rights reserved. This information is not intended as a substitute for professional medical care. Always follow your healthcare professional's instructions.                Influenza (Adult)    Influenza is also called the flu. It is a viral illness that affects the air passages of your lungs. It is different from the common cold. The flu can easily be passed from one to person to another. It may be spread through the air by coughing and sneezing. Or it can be spread by touching the sick person and then touching your own eyes, nose, or mouth.  The flu starts 1 to 3 days after you are exposed to the flu virus. It may last for 1 to 2 weeks. You usually don t need to take antibiotics unless you have a complication. This might be an ear or sinus infection or pneumonia.  Symptoms of the flu may be mild or severe. They can include extreme tiredness (wanting to stay in bed all day), chills, fevers, muscle aches, soreness with eye movement, headache, and a dry, hacking cough.  Home care  Follow these guidelines when caring for yourself at home:    Avoid being around cigarette smoke, whether yours or other people s.    Acetaminophen or ibuprofen will help ease your fever, muscle aches, and headache. Don t give aspirin to anyone younger than 18 who has the flu. Aspirin can harm the liver.    Nausea and loss of appetite are common with the flu. Eat light meals. Drink 6 to 8 glasses of liquids every day. Good choices are water, sport drinks, soft drinks without caffeine, juices, tea, and soup. Extra fluids will also help loosen secretions in your nose and lungs.    Over-the-counter cold medicines will not make  the flu go away faster. But the medicines may help with coughing, sore throat, and congestion in your nose and sinuses. Don t use a decongestant if you have high blood pressure.    Stay home until your fever has been gone for at least 24 hours without using medicine to reduce fever.  Follow-up care  Follow up with your health care provider, or as advised, if you are not getting better over the next week.  If you are 65 or older, talk with your provider about getting a pneumococcal vaccine every 5 years. You should also get this vaccine if you have chronic asthma or COPD. All adults should get a flu vaccine every fall. Ask your provider about this.  When to seek medical advice  Call your health care provider right away if any of these occur:    Cough with lots of colored sputum (mucus) or blood in your sputum    Chest pain, shortness of breath, wheezing, or difficulty breathing    Severe headache, or face, neck, or ear pain    New rash with fever    Fever of 101 F (38 C) oral or higher that doesn t get better with fever medicine    Confusion, behavior change, or seizure    Severe weakness or dizziness    You get a fever or cough after getting better for a few days       7788-9944 [employer]. [address] [place] [address], [place] [address]. All rights reserved. This information is not intended as a substitute for professional medical care. Always follow your healthcare professional's instructions.

## 2017-01-30 LAB
ANION GAP SERPL CALCULATED.3IONS-SCNC: 9 MMOL/L (ref 3–14)
BASOPHILS # BLD AUTO: 0 10E9/L (ref 0–0.2)
BASOPHILS NFR BLD AUTO: 0 %
BUN SERPL-MCNC: 26 MG/DL (ref 7–30)
CALCIUM SERPL-MCNC: 8.5 MG/DL (ref 8.5–10.1)
CHLORIDE SERPL-SCNC: 108 MMOL/L (ref 94–109)
CO2 SERPL-SCNC: 24 MMOL/L (ref 20–32)
CREAT SERPL-MCNC: 1.38 MG/DL (ref 0.66–1.25)
DIFFERENTIAL METHOD BLD: ABNORMAL
EOSINOPHIL # BLD AUTO: 0 10E9/L (ref 0–0.7)
EOSINOPHIL NFR BLD AUTO: 0 %
ERYTHROCYTE [DISTWIDTH] IN BLOOD BY AUTOMATED COUNT: 16.9 % (ref 10–15)
GFR SERPL CREATININE-BSD FRML MDRD: 49 ML/MIN/1.7M2
GLUCOSE SERPL-MCNC: 121 MG/DL (ref 70–99)
HCT VFR BLD AUTO: 34 % (ref 40–53)
HGB BLD-MCNC: 10.9 G/DL (ref 13.3–17.7)
IMM GRANULOCYTES # BLD: 0 10E9/L (ref 0–0.4)
IMM GRANULOCYTES NFR BLD: 0.2 %
LYMPHOCYTES # BLD AUTO: 0.2 10E9/L (ref 0.8–5.3)
LYMPHOCYTES NFR BLD AUTO: 3.7 %
MCH RBC QN AUTO: 20.7 PG (ref 26.5–33)
MCHC RBC AUTO-ENTMCNC: 32.1 G/DL (ref 31.5–36.5)
MCV RBC AUTO: 65 FL (ref 78–100)
MONOCYTES # BLD AUTO: 0.2 10E9/L (ref 0–1.3)
MONOCYTES NFR BLD AUTO: 4.2 %
NEUTROPHILS # BLD AUTO: 3.7 10E9/L (ref 1.6–8.3)
NEUTROPHILS NFR BLD AUTO: 91.9 %
NRBC # BLD AUTO: 0 10*3/UL
NRBC BLD AUTO-RTO: 0 /100
PLATELET # BLD AUTO: 95 10E9/L (ref 150–450)
POTASSIUM SERPL-SCNC: 4.9 MMOL/L (ref 3.4–5.3)
RBC # BLD AUTO: 5.26 10E12/L (ref 4.4–5.9)
SODIUM SERPL-SCNC: 141 MMOL/L (ref 133–144)
WBC # BLD AUTO: 4 10E9/L (ref 4–11)

## 2017-01-30 PROCEDURE — 80048 BASIC METABOLIC PNL TOTAL CA: CPT | Performed by: HOSPITALIST

## 2017-01-30 PROCEDURE — 85025 COMPLETE CBC W/AUTO DIFF WBC: CPT | Performed by: HOSPITALIST

## 2017-01-30 PROCEDURE — 40000275 ZZH STATISTIC RCP TIME EA 10 MIN

## 2017-01-30 PROCEDURE — A9270 NON-COVERED ITEM OR SERVICE: HCPCS | Mod: GY | Performed by: HOSPITALIST

## 2017-01-30 PROCEDURE — 25000125 ZZHC RX 250: Performed by: HOSPITALIST

## 2017-01-30 PROCEDURE — 25000128 H RX IP 250 OP 636: Performed by: HOSPITALIST

## 2017-01-30 PROCEDURE — 99233 SBSQ HOSP IP/OBS HIGH 50: CPT | Performed by: INTERNAL MEDICINE

## 2017-01-30 PROCEDURE — 94640 AIRWAY INHALATION TREATMENT: CPT

## 2017-01-30 PROCEDURE — 36415 COLL VENOUS BLD VENIPUNCTURE: CPT | Performed by: HOSPITALIST

## 2017-01-30 PROCEDURE — 25000132 ZZH RX MED GY IP 250 OP 250 PS 637: Mod: GY | Performed by: HOSPITALIST

## 2017-01-30 PROCEDURE — 94640 AIRWAY INHALATION TREATMENT: CPT | Mod: 76

## 2017-01-30 PROCEDURE — 12000000 ZZH R&B MED SURG/OB

## 2017-01-30 RX ORDER — LISINOPRIL 20 MG/1
20 TABLET ORAL DAILY
Status: DISCONTINUED | OUTPATIENT
Start: 2017-01-30 | End: 2017-01-31

## 2017-01-30 RX ORDER — LEVOFLOXACIN 5 MG/ML
250 INJECTION, SOLUTION INTRAVENOUS EVERY 24 HOURS
Status: DISCONTINUED | OUTPATIENT
Start: 2017-01-30 | End: 2017-02-01 | Stop reason: HOSPADM

## 2017-01-30 RX ADMIN — OSELTAMIVIR PHOSPHATE 30 MG: 30 CAPSULE ORAL at 08:59

## 2017-01-30 RX ADMIN — OSELTAMIVIR PHOSPHATE 30 MG: 30 CAPSULE ORAL at 21:43

## 2017-01-30 RX ADMIN — MONTELUKAST 10 MG: 10 TABLET, FILM COATED ORAL at 08:59

## 2017-01-30 RX ADMIN — ACETAMINOPHEN 650 MG: 325 TABLET, FILM COATED ORAL at 17:47

## 2017-01-30 RX ADMIN — ACETAMINOPHEN 650 MG: 325 TABLET, FILM COATED ORAL at 21:43

## 2017-01-30 RX ADMIN — IPRATROPIUM BROMIDE AND ALBUTEROL SULFATE 3 ML: .5; 3 SOLUTION RESPIRATORY (INHALATION) at 11:52

## 2017-01-30 RX ADMIN — IPRATROPIUM BROMIDE AND ALBUTEROL SULFATE 3 ML: .5; 3 SOLUTION RESPIRATORY (INHALATION) at 19:09

## 2017-01-30 RX ADMIN — METOPROLOL TARTRATE 25 MG: 25 TABLET, FILM COATED ORAL at 21:43

## 2017-01-30 RX ADMIN — IPRATROPIUM BROMIDE AND ALBUTEROL SULFATE 3 ML: .5; 3 SOLUTION RESPIRATORY (INHALATION) at 15:34

## 2017-01-30 RX ADMIN — IPRATROPIUM BROMIDE AND ALBUTEROL SULFATE 3 ML: .5; 3 SOLUTION RESPIRATORY (INHALATION) at 21:46

## 2017-01-30 RX ADMIN — IPRATROPIUM BROMIDE AND ALBUTEROL SULFATE 3 ML: .5; 3 SOLUTION RESPIRATORY (INHALATION) at 07:47

## 2017-01-30 RX ADMIN — ASPIRIN 325 MG ORAL TABLET 325 MG: 325 PILL ORAL at 08:59

## 2017-01-30 RX ADMIN — BUDESONIDE 0.5 MG: 0.5 INHALANT RESPIRATORY (INHALATION) at 07:47

## 2017-01-30 RX ADMIN — LEVOFLOXACIN 250 MG: 5 INJECTION, SOLUTION INTRAVENOUS at 17:47

## 2017-01-30 RX ADMIN — METOPROLOL TARTRATE 25 MG: 25 TABLET, FILM COATED ORAL at 08:59

## 2017-01-30 RX ADMIN — FERROUS SULFATE TAB 325 MG (65 MG ELEMENTAL FE) 325 MG: 325 (65 FE) TAB at 08:59

## 2017-01-30 RX ADMIN — BUDESONIDE 0.5 MG: 0.5 INHALANT RESPIRATORY (INHALATION) at 19:09

## 2017-01-30 RX ADMIN — SIMVASTATIN 40 MG: 40 TABLET, FILM COATED ORAL at 21:43

## 2017-01-30 NOTE — ED NOTES
[facility]  ED Nurse Handoff Report    ED Chief complaint: No chief complaint on file.      ED Diagnosis:   Final diagnoses:   None       Code Status: Full code, but does not want to be maintained  On machines    Allergies:   Allergies   Allergen Reactions     No Known Drug Allergies        Activity level:  Stand with Assist     Needed?: No    Isolation: yes  INFLUENZA A  Infection: Not Applicable    Bariatric?: No      Vital Signs:   Filed Vitals:    01/29/17 1750 01/29/17 1810 01/29/17 1815 01/29/17 1817   BP: 151/82 151/68 151/69 151/69   Pulse: 88      Temp: 101.8  F (38.8  C)      TempSrc: Oral      Resp: 24   24   Height:       Weight:       SpO2: 97% 99% 98% 92%       Cardiac Rhythm: ,        Pain level:      Is this patient confused?: No     Pt is [place]    Patient Report: Initial Complaint: SOB, Asthma  Focused Assessment: alert, [place]. Feverish (101.8) coarse lung sounds right, and decreased left on admit  Tests Performed: labs and xray,  Nebs   Abnormal Results: BNP 4211,  Influenza A  Treatments provided: nebs, tylenol,      Family Comments: son here    OBS brochure/video discussed/provided to patient: No    ED Medications:   Medications   levofloxacin (LEVAQUIN) infusion 750 mg (not administered)   ipratropium - albuterol 0.5 mg/2.5 mg/3 mL (DUONEB) neb solution 3 mL (3 mLs Nebulization Given 1/29/17 1734)   ipratropium - albuterol 0.5 mg/2.5 mg/3 mL (DUONEB) neb solution 3 mL (3 mLs Nebulization Given 1/29/17 1850)   methylPREDNISolone sodium succinate (solu-MEDROL) injection 125 mg (125 mg Intravenous Given 1/29/17 1809)   ipratropium - albuterol 0.5 mg/2.5 mg/3 mL (DUONEB) neb solution 3 mL (3 mLs Nebulization Given 1/29/17 1802)   0.9% sodium chloride BOLUS (1,000 mLs Intravenous New Bag 1/29/17 1835)   acetaminophen (TYLENOL) tablet 1,000 mg (1,000 mg Oral Given 1/29/17 1835)       Drips infusing?:  No      ED NURSE PHONE NUMBER: [phone]

## 2017-01-30 NOTE — PROGRESS NOTES
[facility]    Hospitalist Progress Note    Date of Service (when I saw the patient): 01/30/2017    Assessment and Plan   Mr. Shiraz Ingram is an 82-year-old male, hard of hearing with past medical history significant for hypertension, dyslipidemia, asthma, CKD, CAD who presented to the ER with complaints of shortness of breath.      1. Acute hypoxic respiratory failure, secondary to influenza, probable pneumonia and also probable diastolic CHF.   -Continue Tamiflu and Levaquin   -CXR- patchy opacity in the left lung base  -DuoNebs p.r.n.,   -wean oxygen as tolerated.   -physical therapy evaluation.      2. Possible diastolic CHF.   -Echo 9/2016 with EF of 55-60%  -No documented prior history of congestive heart failure  -chest x-ray shows some pulmonary vascular congestion and increased interstitial prominence  -Does have elevated BNP   -appears fairly euvolemic though  -Hold off on diuretics at this time    3. Hypertension/ hyperlipidemia/history of coronary artery disease status post bypass graft:  -Continue lisinopril(skipped today's dose due to soft blood pressure) and Lopressor  --Blood pressure was soft in the morning, skip lisinopril today and resume in the morning  - daily     4.  Dyslipidemia:    -Continue simvastatin      5.  Moderate persistent asthma:  -Not in exacerbation  -Continue prior to admission Pulmicort neb and Singulair  -DuoNeb p.r.n.    6.  Chronic kidney disease stage III  -Creatinine marginally worse today, however at baseline  -Recheck in a.m.      D/W: RN  DVT Prophylaxis: Pneumatic Compression Devices  Code Status: Full Code    Disposition: Expected discharge in 1-3 days, pending clinical improvement    Alberto Romero MD    Interval History  Dyspnea better.  Oxygen stable at 2 L.  No chest pain. Afebrile this morning.    -Data reviewed today: I reviewed all new labs and imaging results over the last 24 hours. I personally reviewed chest x-ray showing bilateral  pulmonary venous congestion and interstitial prominence.    Physical Exam  Temp: 98.1  F (36.7  C) Temp src: Oral BP: 123/68 mmHg Pulse: 83 Heart Rate: 71 Resp: 18 SpO2: 96 % O2 Device: Nasal cannula Oxygen Delivery: 2 LPM  Filed Vitals:    01/29/17 1720   Weight: 76.204 kg (168 lb)     Vital Signs with Ranges  Temp:  [98.1  F (36.7  C)-101.8  F (38.8  C)] 98.1  F (36.7  C)  Pulse:  [80-88] 83  Heart Rate:  [61-87] 71  Resp:  [18-26] 18  BP: (106-185)/(47-97) 123/68 mmHg  SpO2:  [84 %-100 %] 96 %       Constitutional: AAOX3, NAD, Appears comfortable, hard of hearing  HEENT: Moist oral mucosa, no oral lesions, No pallor or icterus  Neck- Supple, Good ROM, No JVD  Respiratory:  Crackles and rhonchi bilaterally, normal effort of breathing  Cardiovascular: RRR, No murmur  GI: Soft, Non- tender, BS- normoactive, No Guarding/rebound/rigidity  Skin/Integument: Warm and dry, no rashes  MSK: No joint deformity or swelling, no edema  Neuro: CN- grossly intact      Medications       lisinopril  20 mg Oral Daily     levofloxacin  250 mg Intravenous Q24H     oseltamivir  30 mg Oral BID     ipratropium - albuterol 0.5 mg/2.5 mg/3 mL  3 mL Nebulization 4x daily     aspirin tablet 325 mg  325 mg Oral Daily     budesonide  0.5 mg Nebulization BID     ferrous sulfate  325 mg Oral Daily     metoprolol  25 mg Oral BID     montelukast  10 mg Oral Daily     simvastatin  40 mg Oral At Bedtime       Data    Recent Labs  Lab 01/30/17  0748 01/29/17  1810   WBC 4.0 5.4   HGB 10.9* 11.9*   MCV 65* 65*   PLT 95* 97*    140   POTASSIUM 4.9 4.5   CHLORIDE 108 108   CO2 24 24   BUN 26 23   CR 1.38* 1.19   ANIONGAP 9 8   AYALA 8.5 9.0   * 104*       Recent Results (from the past 24 hour(s))   Chest XR,  PA & LAT    Narrative    CHEST TWO VIEW 1/29/2017 6:23 PM     COMPARISON: Two-view chest x-ray 9/6/2016.    HISTORY: Shortness of breath.      Impression    IMPRESSION: Median sternotomy changes again noted.    There is new patchy  airspace opacity in the left lung base that may  represent atelectasis or pneumonia. There are no airspace opacities in  the right lung. There is increased prominence of the perihilar  pulmonary vasculature and increased interstitial prominence in both  lungs that may represent congestive failure with pulmonary edema.  There is no pneumothorax. There is no definite pleural effusion. Heart  size is mildly enlarged.    JAYLIN RAMIREZ MD

## 2017-01-30 NOTE — PHARMACY-ADMISSION MEDICATION HISTORY
Admission medication history interview status for the 1/29/2017  admission is complete. See EPIC admission navigator for prior to admission medications     Medication history source reliability:Good    Actions taken by pharmacist (provider contacted, etc):Patient's son helped and they had a list      Additional medication history information not noted on PTA med list :    -Added aspirin 325 mg daily   -Removed combivent and pulmicort inhalers as patient stated he only uses an albuterol inhaler as needed and two nebulizers   -Patient had one dose of metoprolol PTA       Medication reconciliation/reorder completed by provider prior to medication history? No    Time spent in this activity: 30 mins    Prior to Admission medications    Medication Sig Last Dose Taking? Auth Provider   Multiple Vitamins-Minerals (MULTIVITAMIN PO) Take 1 tablet by mouth daily 1/29/2017 at Unknown time Yes Unknown, Entered By History   FERROUS SULFATE PO Take 325 mg by mouth daily 1/28/2017 at Unknown time Yes Unknown, Entered By History   ASPIRIN PO Take 325 mg by mouth daily 1/29/2017 at Unknown time Yes Unknown, Entered By History   HYDROcodone-acetaminophen (NORCO) 5-325 MG per tablet Take 1-2 tablets by mouth every 4 hours as needed for other (Moderate to Severe Pain) Past Week at prn Yes Stephanie yT PA-C   simvastatin (ZOCOR) 40 MG tablet Take 1 tablet (40 mg) by mouth At Bedtime 1/28/2017 at Unknown time Yes Dany Rodriguez MD   lisinopril (PRINIVIL,ZESTRIL) 20 MG tablet Take 1 tablet (20 mg) by mouth daily 1/29/2017 at Unknown time Yes Dany Rodriguez MD   metoprolol (LOPRESSOR) 50 MG tablet Take 0.5 tablets (25 mg) by mouth 2 times daily 1/29/2017 at Unknown time Yes Dany Rodriguez MD   budesonide (PULMICORT) 0.5 MG/2ML nebulizer solution Take 2 mLs (0.5 mg) by nebulization 2 times daily 1/29/2017 at Unknown time Yes Dany Rodriguez MD   montelukast (SINGULAIR) 10 MG tablet Take  1 tablet (10 mg) by mouth daily 1/29/2017 at Unknown time Yes Dany Rodriguez MD   ipratropium - albuterol 0.5 mg/2.5 mg/3 mL (DUONEB) 0.5-2.5 (3) MG/3ML nebulization Inhale 1 vial (3 mLs) into the lungs 4 times daily 1/29/2017 at Unknown time Yes Dany Rodriguez MD   albuterol (ALBUTEROL) 108 (90 BASE) MCG/ACT inhaler INHALE 1 TO 2 PUFFS EVERY 4 TO 6 HOURS AS NEEDED 1/29/2017 at prn Yes Dany Rodriguez MD   CALTRATE 600 + D 600-200 MG-IU OR TABS TAKE 1 TABLET BY MOUTH DAILY 1/29/2017 at Unknown time Yes Spatz, Lisa, MD

## 2017-01-30 NOTE — H&P
PRIMARY CARE PHYSICIAN:  Dr. Dany Rodriguez      CHIEF COMPLAINT:  Shortness of breath, cough.      HISTORY OF PRESENT ILLNESS:  History is slightly limited from the patient because he is extremely hard of hearing despite using the hearing aids.  His son was mostly interpreting for him using sign language.  Mr. Shiraz Ingram is an 82-year-old male with a past medical history significant for hypertension, dyslipidemia, CAD, status post CABG, CKD, moderate persistent asthma, history of influenza who was brought to the ER today for shortness of breath and cough.  He lives with his son who states patient started having shortness of breath, cough, wheezing and he tried several nebulizers without significant relief and thus he presented to the ER for further evaluation.  Here in the ER, was seen by Dr. Sharma and was noted to be hypoxic to 84% on room air.  He was given some DuoNebs and steroids and felt slightly better, but then was noted to have a fever and subsequent labs were noted with influenza A positive and then chest x-ray was also concerning for some pneumonia and some pulmonary vascular congestion, so he was given a dose of Levaquin as well and hospitalist was requested admission for further evaluation.  He denies any chest pain.  Denies pain in abdomen, reports a normal bowel and bladder habit.      REVIEW OF SYSTEMS:  A 10-point review of systems was done and was negative apart from those mentioned in the history of present illness.      PAST MEDICAL HISTORY:   1.  Moderate persistent asthma.   2.  Hypertension.   3.  Dyslipidemia.   4.  Chronic kidney disease, stage III, baseline creatinine around 1-1.2   5.  CAD, status post CABG in 2002.   6.  Mild aortic stenosis.   7.  History of influenza A.      MEDICATIONS PRIOR TO ADMISSION:  This needs to be verified by the pharmacy, but he seems to be on simvastatin, lisinopril, metoprolol, iron sulfate, budesonide nebs, Singulair, DuoNebs, albuterol inhaler,  Combivent inhaler, calcium, vitamin D, multivitamin.      ALLERGIES:  No known drug allergies.      SOCIAL HISTORY:  He lives with his adult son.  Denies smoking, no alcohol or illicit drug use.      FAMILY HISTORY:  Father with CAD.  Mother with breast cancer.  Family history otherwise not pertinent to current presentation.      PHYSICAL EXAMINATION:   GENERAL:  The patient is conscious, alert, awake, oriented x3, very hard of hearing, lying comfortably in bed in no apparent distress.   VITAL SIGNS:  Temperature 101.8 degrees Fahrenheit, heart rate of 80, blood pressure 142/78, saturation 84% on room air, 100% on 2 liters.   HEENT:  Pupils are equal and reactive to light and accommodation.  Extraocular movements are intact.  Oral mucosa is moist.   NECK:  Supple, no raised JVD.   RESPIRATORY:  Lung sounds bilaterally clear to auscultation, no wheezes or crepitations.   CARDIOVASCULAR:  Normal S1, S2, regular rate and rhythm, no murmur.   ABDOMEN:  Soft, nontender, nondistended, no guarding, rigidity or rebound tenderness.   LOWER EXTREMITIES:  With mild bilateral edema present.   NEUROLOGIC:  No focal neurological deficits noted.  Cranial nerves II-XII grossly intact.   PSYCHIATRIC:  Normal mood and affect.      LABORATORY DATA:  BMP with a creatinine of 1.19, potassium 4.5, normal lactate.  BNP  elevated at 4211.  CBC with WBC of 5.4, hemoglobin 11.9, platelet 97.  Blood cultures pending.  Influenza was positive for influenza A.        IMAGING:  Chest x-ray reviewed by me shows mild left lung base patchy opacity concerning for pneumonia or atelectasis, also some pulmonary vascular congestion, no pleural effusion.      ASSESSMENT AND PLAN:  Mr. Shiraz Ingram is an 82-year-old male, hard of hearing with past medical history significant for hypertension, dyslipidemia, asthma, CKD, CAD who presented to the ER today with complaints of shortness of breath.     1.  Acute hypoxic respiratory failure, likely secondary to  influenza, probable pneumonia and also probable CHF without exacerbation and diastolic.  He is currently comfortable after nebs and steroids given in the ER.  Will admit him as inpatient.  Will start him on Tamiflu.  Will also continue with the Levaquin that was initiated in the ER.  Will have DuJuanbs p.r.n., will resume his prior to admission steroid inhaler when verified by pharmacy.  He clinically does not seem to be volume overloaded, so will hold off on any diuresis for now.  Will try to wean him off the oxygen.  Will get physical therapy and  for disposition planning.  Will follow up on the blood culture.     2.  Concern for diastolic CHF.  Although, the chest x-ray shows some pulmonary vascular congestion and has mild edema, he seems to be asymptomatic prior to yesterday and he had a recent stress echocardiogram in 2016, which showed a normal EF of 55-60% and had a negative stress test.  Will just monitor him clinically for now, appears euvolemic, no need for diuresis at this time.     3.  Hypertension.  Will resume his lisinopril and Lopressor when verified by Pharmacy.   4.  Dyslipidemia:  Resume simvastatin when verified by Pharmacy.   5.  Chronic kidney disease, stage III.  Creatinine seems to be stable around baseline.   6.  Deep venous thrombosis prophylaxis.  Mechanical with PCD boots given his mild thrombocytopenia.  He has had history of pancytopenia in the past with influenza.      CODE STATUS:  This was discussed and he wishes to be full code, but does not want to be maintained on life support indefinitely.         OLIVIER MONTERO MD             D: 2017 19:42   T: 2017 21:56   MT: THOMAS      Name:     SRIKANTH OBANDO   MRN:      [id number]        Account:      [id number]   :      1934           Admitted:     [id number]      Document: [id number]       cc: Dany Rodriguez MD

## 2017-01-30 NOTE — PLAN OF CARE
Problem: Goal Outcome Summary  Goal: Goal Outcome Summary  Outcome: Improving  Sats 96 on 2LNC. All other VSS. Very [place];  utilized; scheduled again for 4pm for next shift; then again tomorrow (9am & 4pm). A&Ox4. Reports some SOB at rest but denies CLEARY. Breathing non-labored. LS somewhat coarse. Infrequent, productive cough. Trace BLE edema. Up with SBA to BR. Voiding adequately. Plan for d/c in next day or so pending progress and ability to wean oxygen. Continue to monitor.

## 2017-01-30 NOTE — PLAN OF CARE
Problem: Goal Outcome Summary  Goal: Goal Outcome Summary  Outcome: No Change  Pt is A&Ox3-4, [place], hearing aid in R ear, uses sign language. VSS on RA. Up with one assist. Continent. +FluA. Exp wheezes . BS+.  scheduled for 9-12AM. Please pg MD when they are here. Continue to monitor.

## 2017-01-30 NOTE — PROGRESS NOTES
Care Transition Initial Assessment -      Met with: Patient  Active Problems:    Influenza A    Shortness of breath    Influenza A with pneumonia         DATA  Lives With: two sons, Willi and Nicko.  Per patient profile, at baseline patient is independent with personal cares.   is following per MD referral for d/c planning.  Order entered on 1/29.  Met with patient,  was not present.  Writer wrote questions and patient spoke his responses.  Patient shares one son lives on the upper level of his home and one on the lower level and he lives on the main floor.  He prepares meals for he and his son 4 days a week and his sons prepare the evening meal on Friday, Saturday and Sunday.     Identified issues/concerns regarding health management:  None currently.  Patient feels that they have adequate support @ home?  yes         ASSESSMENT  Cognitive Status:  Appears alert, oriented x4.  Patient was engaged in questions asked of him.    Concerns to be addressed:  Because patient is alone during the day and depending on patient's progress by discharge he may  benefit from TCU or home RN and therapies   Dr Romero is ordering PT to assess his mobility and strength to return alone where he will be alone during the day.     PLAN  Patient Goals and Preferences: Discharge back home  Patient anticipates discharging to home with sons

## 2017-01-30 NOTE — PLAN OF CARE
Problem: Goal Outcome Summary  Goal: Goal Outcome Summary  A&O. Pt is very [place], hearing aid in R ear, uses sign language. VSS on 2L O2. Up with A1. LS diminished with expiratory wheezes. Frequent productive cough with green/yellow sputum. Voiding adequately. Slept between cares.

## 2017-01-31 ENCOUNTER — APPOINTMENT (OUTPATIENT)
Dept: PHYSICAL THERAPY | Facility: CLINIC | Age: 82
DRG: 193 | End: 2017-01-31
Attending: INTERNAL MEDICINE
Payer: MEDICARE

## 2017-01-31 LAB
ALBUMIN UR-MCNC: NEGATIVE MG/DL
ANION GAP SERPL CALCULATED.3IONS-SCNC: 5 MMOL/L (ref 3–14)
APPEARANCE UR: CLEAR
BILIRUB UR QL STRIP: NEGATIVE
BUN SERPL-MCNC: 28 MG/DL (ref 7–30)
CALCIUM SERPL-MCNC: 8.3 MG/DL (ref 8.5–10.1)
CHLORIDE SERPL-SCNC: 105 MMOL/L (ref 94–109)
CO2 SERPL-SCNC: 28 MMOL/L (ref 20–32)
COLOR UR AUTO: ABNORMAL
CREAT SERPL-MCNC: 1.44 MG/DL (ref 0.66–1.25)
ERYTHROCYTE [DISTWIDTH] IN BLOOD BY AUTOMATED COUNT: 16.7 % (ref 10–15)
GFR SERPL CREATININE-BSD FRML MDRD: 47 ML/MIN/1.7M2
GLUCOSE SERPL-MCNC: 119 MG/DL (ref 70–99)
GLUCOSE UR STRIP-MCNC: NEGATIVE MG/DL
HCT VFR BLD AUTO: 35.7 % (ref 40–53)
HGB BLD-MCNC: 11.4 G/DL (ref 13.3–17.7)
HGB UR QL STRIP: NEGATIVE
KETONES UR STRIP-MCNC: NEGATIVE MG/DL
LEUKOCYTE ESTERASE UR QL STRIP: NEGATIVE
MCH RBC QN AUTO: 20.8 PG (ref 26.5–33)
MCHC RBC AUTO-ENTMCNC: 31.9 G/DL (ref 31.5–36.5)
MCV RBC AUTO: 65 FL (ref 78–100)
MUCOUS THREADS #/AREA URNS LPF: PRESENT /LPF
NITRATE UR QL: NEGATIVE
PH UR STRIP: 5 PH (ref 5–7)
PLATELET # BLD AUTO: 95 10E9/L (ref 150–450)
POTASSIUM SERPL-SCNC: 4.8 MMOL/L (ref 3.4–5.3)
RBC # BLD AUTO: 5.49 10E12/L (ref 4.4–5.9)
RBC #/AREA URNS AUTO: <1 /HPF (ref 0–2)
SODIUM SERPL-SCNC: 138 MMOL/L (ref 133–144)
SP GR UR STRIP: 1.01 (ref 1–1.03)
URN SPEC COLLECT METH UR: ABNORMAL
UROBILINOGEN UR STRIP-MCNC: NORMAL MG/DL (ref 0–2)
WBC # BLD AUTO: 4.2 10E9/L (ref 4–11)
WBC #/AREA URNS AUTO: 1 /HPF (ref 0–2)

## 2017-01-31 PROCEDURE — 97530 THERAPEUTIC ACTIVITIES: CPT | Mod: GP | Performed by: PHYSICAL THERAPIST

## 2017-01-31 PROCEDURE — 94640 AIRWAY INHALATION TREATMENT: CPT

## 2017-01-31 PROCEDURE — 36415 COLL VENOUS BLD VENIPUNCTURE: CPT | Performed by: INTERNAL MEDICINE

## 2017-01-31 PROCEDURE — 97161 PT EVAL LOW COMPLEX 20 MIN: CPT | Mod: GP | Performed by: PHYSICAL THERAPIST

## 2017-01-31 PROCEDURE — A9270 NON-COVERED ITEM OR SERVICE: HCPCS | Mod: GY | Performed by: INTERNAL MEDICINE

## 2017-01-31 PROCEDURE — 40000275 ZZH STATISTIC RCP TIME EA 10 MIN

## 2017-01-31 PROCEDURE — 12000000 ZZH R&B MED SURG/OB

## 2017-01-31 PROCEDURE — 99232 SBSQ HOSP IP/OBS MODERATE 35: CPT | Performed by: INTERNAL MEDICINE

## 2017-01-31 PROCEDURE — 25000132 ZZH RX MED GY IP 250 OP 250 PS 637: Mod: GY | Performed by: INTERNAL MEDICINE

## 2017-01-31 PROCEDURE — A9270 NON-COVERED ITEM OR SERVICE: HCPCS | Mod: GY | Performed by: HOSPITALIST

## 2017-01-31 PROCEDURE — 81001 URINALYSIS AUTO W/SCOPE: CPT | Performed by: INTERNAL MEDICINE

## 2017-01-31 PROCEDURE — 85027 COMPLETE CBC AUTOMATED: CPT | Performed by: INTERNAL MEDICINE

## 2017-01-31 PROCEDURE — 94640 AIRWAY INHALATION TREATMENT: CPT | Mod: 76

## 2017-01-31 PROCEDURE — 80048 BASIC METABOLIC PNL TOTAL CA: CPT | Performed by: INTERNAL MEDICINE

## 2017-01-31 PROCEDURE — 25000132 ZZH RX MED GY IP 250 OP 250 PS 637: Mod: GY | Performed by: HOSPITALIST

## 2017-01-31 PROCEDURE — 40000193 ZZH STATISTIC PT WARD VISIT: Performed by: PHYSICAL THERAPIST

## 2017-01-31 PROCEDURE — 25000128 H RX IP 250 OP 636: Performed by: HOSPITALIST

## 2017-01-31 PROCEDURE — 25000125 ZZHC RX 250: Performed by: HOSPITALIST

## 2017-01-31 RX ORDER — ACETAMINOPHEN 500 MG
1000 TABLET ORAL EVERY 8 HOURS SCHEDULED
Status: DISCONTINUED | OUTPATIENT
Start: 2017-01-31 | End: 2017-01-31

## 2017-01-31 RX ADMIN — IPRATROPIUM BROMIDE AND ALBUTEROL SULFATE 3 ML: .5; 3 SOLUTION RESPIRATORY (INHALATION) at 19:32

## 2017-01-31 RX ADMIN — ACETAMINOPHEN 650 MG: 325 TABLET, FILM COATED ORAL at 03:07

## 2017-01-31 RX ADMIN — BUDESONIDE 0.5 MG: 0.5 INHALANT RESPIRATORY (INHALATION) at 07:45

## 2017-01-31 RX ADMIN — METOPROLOL TARTRATE 25 MG: 25 TABLET, FILM COATED ORAL at 21:06

## 2017-01-31 RX ADMIN — FERROUS SULFATE TAB 325 MG (65 MG ELEMENTAL FE) 325 MG: 325 (65 FE) TAB at 08:28

## 2017-01-31 RX ADMIN — OSELTAMIVIR PHOSPHATE 30 MG: 30 CAPSULE ORAL at 08:28

## 2017-01-31 RX ADMIN — LISINOPRIL 20 MG: 20 TABLET ORAL at 08:28

## 2017-01-31 RX ADMIN — IPRATROPIUM BROMIDE AND ALBUTEROL SULFATE 3 ML: .5; 3 SOLUTION RESPIRATORY (INHALATION) at 07:45

## 2017-01-31 RX ADMIN — MONTELUKAST 10 MG: 10 TABLET, FILM COATED ORAL at 08:28

## 2017-01-31 RX ADMIN — IPRATROPIUM BROMIDE AND ALBUTEROL SULFATE 3 ML: .5; 3 SOLUTION RESPIRATORY (INHALATION) at 15:26

## 2017-01-31 RX ADMIN — METOPROLOL TARTRATE 25 MG: 25 TABLET, FILM COATED ORAL at 08:28

## 2017-01-31 RX ADMIN — ASPIRIN 325 MG ORAL TABLET 325 MG: 325 PILL ORAL at 08:28

## 2017-01-31 RX ADMIN — BUDESONIDE 0.5 MG: 0.5 INHALANT RESPIRATORY (INHALATION) at 19:32

## 2017-01-31 RX ADMIN — OSELTAMIVIR PHOSPHATE 30 MG: 30 CAPSULE ORAL at 21:06

## 2017-01-31 RX ADMIN — IPRATROPIUM BROMIDE AND ALBUTEROL SULFATE 3 ML: .5; 3 SOLUTION RESPIRATORY (INHALATION) at 11:33

## 2017-01-31 RX ADMIN — SIMVASTATIN 40 MG: 40 TABLET, FILM COATED ORAL at 21:05

## 2017-01-31 RX ADMIN — LEVOFLOXACIN 250 MG: 5 INJECTION, SOLUTION INTRAVENOUS at 17:38

## 2017-01-31 NOTE — PLAN OF CARE
Problem: Goal Outcome Summary  Goal: Goal Outcome Summary  Outcome: No Change  Alert & oriented, SBA to bathroom, droplet isolation, lung sounds coarse, complain of shortness of breathe when ambulating to bathroom and pain when coughing. Tylenol given with relief,Frequent productive cough, on 2 liters oxygen, SPO2 in mid 90's.

## 2017-01-31 NOTE — PLAN OF CARE
Problem: Goal Outcome Summary  Goal: Goal Outcome Summary  Alert and oriented x 4. Very [place];  utilized for part of mukesh shift. VSS on 2L O2, with sats of 96%. LS coarse with expiratory wheezing throughout. C/o pain s/t coughing; received PRN tylenol. Denied nausea. SOB at times, when coughing. Sched and PRN neb treatments. Droplet isolation. Tolerating 2gram NA diet. Up with SBA. Voiding adequately. BM x 1. Possible d/c tomorrow. Nursing will continue to monitor.

## 2017-01-31 NOTE — PROGRESS NOTES
Pt is 2LPM  with 02 Sats of 93%. Lung sounds:coarse and expiratory wheezes throughout. Neb tx given as schedule. Will continue to follow.  1/31/17  Lucille Biswas

## 2017-01-31 NOTE — PROGRESS NOTES
[facility]    Hospitalist Progress Note    Date of Service (when I saw the patient): 01/31/2017    Assessment and Plan  Shiraz Ingram is a 82 year old male who was admitted on 1/29/2017 with dyspnea.    1. Acute hypoxic respiratory failure due to CAP and influenza A  - Continue levaquin and tamiflu  - Wean oxygen as tolerated  - Continue Pulmicort, montelukast, and Duonebs  - Duonebs qid    2. HTN  - Continue metoprolol, lisinopril    3. HL  - Continue simvastatin    4. Elevated Cr, unsure of baseline  - Hold lisinopril  - Check UA and FENa    DVT Prophylaxis: Pneumatic Compression Devices  Code Status: Full Code    Disposition: Expected discharge in 1-2 days.    Aniket Monique  Text Page (7 am to 6 pm)    Interval History  The patient is sitting comfortably in bed.  He complains of epigastric pain with sitting up.  Appetite is good.    -Data reviewed today: I reviewed all new labs and imaging results over the last 24 hours. I personally reviewed no images or EKG's today.    Physical Exam  Temp: 98  F (36.7  C) Temp src: Axillary BP: 139/81 mmHg   Heart Rate: 66 Resp: 20 SpO2: 93 % O2 Device: Nasal cannula Oxygen Delivery: 2 LPM  Filed Vitals:    01/29/17 1720   Weight: 76.204 kg (168 lb)     Vital Signs with Ranges  Temp:  [98  F (36.7  C)-99.1  F (37.3  C)] 98  F (36.7  C)  Heart Rate:  [] 66  Resp:  [20] 20  BP: (139-186)/() 139/81 mmHg  SpO2:  [92 %-97 %] 93 %  I/O last 3 completed shifts:  In: 650 [P.O.:650]  Out: -     Gen: Well nourished, well developed, alert and oriented x 3, no acute distressed  HEENT: Atraumatic, normocephalic  Lungs: Bilateral wheezes and rhonchi, no rales  Heart: Regular rate and rhythm, no murmurs, gallops, or rubs  GI: Bowel sound normal, no hepatosplenomegaly or masses  Lymph: No lymphadenopathy or edema  Skin: No rashes     Medications       lisinopril  20 mg Oral Daily     levofloxacin  250 mg Intravenous Q24H     oseltamivir  30 mg Oral BID      aspirin tablet 325 mg  325 mg Oral Daily     budesonide  0.5 mg Nebulization BID     ferrous sulfate  325 mg Oral Daily     metoprolol  25 mg Oral BID     montelukast  10 mg Oral Daily     simvastatin  40 mg Oral At Bedtime       Data    Recent Labs  Lab 01/31/17  0855 01/30/17  0748 01/29/17  1810   WBC 4.2 4.0 5.4   HGB 11.4* 10.9* 11.9*   MCV 65* 65* 65*   PLT 95* 95* 97*    141 140   POTASSIUM 4.8 4.9 4.5   CHLORIDE 105 108 108   CO2 28 24 24   BUN 28 26 23   CR 1.44* 1.38* 1.19   ANIONGAP 5 9 8   AYALA 8.3* 8.5 9.0   * 121* 104*       No results found for this or any previous visit (from the past 24 hour(s)).

## 2017-01-31 NOTE — PROGRESS NOTES
01/31/17 1217   Quick Adds   Type of Visit Initial PT Evaluation   Living Environment   Lives With child(perla), adult   Living Arrangements house   Transportation Available car;family or friend will provide   Self-Care   Dominant Hand right   Usual Activity Tolerance good   Current Activity Tolerance moderate   Regular Exercise yes   Activity/Exercise Type walking   Exercise Amount/Frequency 10 mins;30 mins;daily   Activity/Exercise/Self-Care Comment walks the dog daily   Functional Level Prior   Ambulation 0-->independent   Transferring 0-->independent   Toileting 0-->independent   Bathing 0-->independent   Dressing 0-->independent   Eating 0-->independent   Communication 0-->understands/communicates without difficulty   Swallowing 0-->swallows foods/liquids without difficulty   Cognition 0 - no cognition issues reported   Fall history within last six months no   Prior Functional Level Comment independent at baseline, currently with decreased activity tolerance post influenza   General Information   Onset of Illness/Injury or Date of Surgery - Date 01/29/17   Referring Physician Dr Romero    Patient/Family Goals Statement to go home   Pertinent History of Current Problem (include personal factors and/or comorbidities that impact the POC) 83 yo male adm with influenza A   Precautions/Limitations (Droplet)   Cognitive Status Examination   Orientation orientation to person, place and time   Level of Consciousness alert   Follows Commands and Answers Questions 100% of the time;able to follow multistep instructions   Personal Safety and Judgment intact   Cognitive Comment Not formally assessed.   Pain Assessment   Patient Currently in Pain No   Range of Motion (ROM)   ROM Comment WFL as obs via functional mobility   Strength   Strength Comments WFL as demonstrated via functional mobility    Bed Mobility   Bed Mobility Comments independent   Transfer Skills   Transfer Comments independent   Gait   Gait Comments  "independent x 50' with no LOB, good pivot turns in the room.-   Balance   Balance Comments 5x sit<> 20 seconds, good control, pt does demonstrates some wheezing autibly.   General Therapy Interventions   Planned Therapy Interventions progressive activity/exercise   Clinical Impression   Criteria for Skilled Therapeutic Intervention yes, treatment indicated   PT Diagnosis Impaired functional activity tolerance   Influenced by the following impairments Generalized weakness and fatigue   Functional limitations due to impairments Decreased activity tolerance   Clinical Presentation Stable/Uncomplicated   Clinical Presentation Rationale Pt has the flu, feels weak, needs time to improve. CUrrent mobility status, PLOF   Clinical Decision Making (Complexity) Low complexity   Predicted Duration of Therapy Intervention (days/wks) Eval and single treatment   Anticipated Discharge Disposition Home   Risk & Benefits of therapy have been explained Yes   Patient, Family & other staff in agreement with plan of care Yes   [facility] TM \"6 Clicks\"   2016, Trustees of [facility], under license to [employer].  All rights reserved.   6 Clicks Short Forms Basic Mobility Inpatient Short Form   [facility]  \"6 Clicks\" V.2 Basic Mobility Inpatient Short Form   1. Turning from your back to your side while in a flat bed without using bedrails? 4 - None   2. Moving from lying on your back to sitting on the side of a flat bed without using bedrails? 4 - None   3. Moving to and from a bed to a chair (including a wheelchair)? 4 - None   4. Standing up from a chair using your arms (e.g., wheelchair, or bedside chair)? 4 - None   5. To walk in hospital room? 4 - None   6. Climbing 3-5 steps with a railing? 4 - None   Basic Mobility Raw Score (Score out of 24.Lower scores equate to lower levels of function) 24   Total Evaluation Time   Total Evaluation Time (Minutes) 8     "

## 2017-01-31 NOTE — PLAN OF CARE
Problem: Goal Outcome Summary  Goal: Goal Outcome Summary  PT: Evaluation completed, treatment provided. Pt 81 yo male adm with influenza A. Resides in a house, one of his son's lives up stairs and another down stairs. The pt has all access on one level. Walks his dog daily, and is active at baseline. No use of AD. Demonstrates independence with bed mobility, transfers and gait. On room air O2 93% post ambulation in room and repeat sit<>stand. Pt demonstrates good safety awareness, adequate strength with mobility, likely below baseline with activity tolerance secondary to having the flu. No further PT or OT needs identified, discharge from PT. Pt in agreement, plan is for discharge home.       Physical Therapy Discharge Summary    Reason for therapy discharge:    All goals and outcomes met, no further needs identified.    Progress towards therapy goal(s). See goals on Care Plan in [facility] electronic health record for goal details.  Goals met    Therapy recommendation(s):    Pt will benefit from continued ambulation with staff, needs mask. Recommended up to chair for all meals and to continue to increase activity for general strength benefits.

## 2017-01-31 NOTE — PROGRESS NOTES
Patient on 2-3L NC. LS coarse with expiratory wheezes. Neb tx given as ordered. Will continue to follow.    Kacey TAYLOR

## 2017-01-31 NOTE — PLAN OF CARE
Problem: Goal Outcome Summary  Goal: Goal Outcome Summary  Outcome: Improving  Patient A&Ox4.  [place]/deaf- here during day. Up with SBA to bathroom.  Vitals stable, on 2 liters oxygen.  Oxygen saturations on room air 88-90%.  C/o SOB at times with exertion.  Lungs expiratory wheezes, diminished.  Productive cough at times.  On scheduled nebs, antibiotic, and Tamiflu.  Appetite good.  Possible dc home tomorrow.  Continue to monitor.

## 2017-02-01 VITALS
WEIGHT: 168 LBS | RESPIRATION RATE: 18 BRPM | HEIGHT: 68 IN | DIASTOLIC BLOOD PRESSURE: 78 MMHG | HEART RATE: 83 BPM | BODY MASS INDEX: 25.46 KG/M2 | SYSTOLIC BLOOD PRESSURE: 138 MMHG | TEMPERATURE: 98.4 F | OXYGEN SATURATION: 94 %

## 2017-02-01 LAB
ANION GAP SERPL CALCULATED.3IONS-SCNC: 4 MMOL/L (ref 3–14)
BUN SERPL-MCNC: 26 MG/DL (ref 7–30)
CALCIUM SERPL-MCNC: 8.7 MG/DL (ref 8.5–10.1)
CHLORIDE SERPL-SCNC: 104 MMOL/L (ref 94–109)
CO2 SERPL-SCNC: 30 MMOL/L (ref 20–32)
CREAT SERPL-MCNC: 1.39 MG/DL (ref 0.66–1.25)
ERYTHROCYTE [DISTWIDTH] IN BLOOD BY AUTOMATED COUNT: 16.6 % (ref 10–15)
GFR SERPL CREATININE-BSD FRML MDRD: 49 ML/MIN/1.7M2
GLUCOSE SERPL-MCNC: 89 MG/DL (ref 70–99)
HCT VFR BLD AUTO: 37.4 % (ref 40–53)
HGB BLD-MCNC: 12 G/DL (ref 13.3–17.7)
MCH RBC QN AUTO: 20.6 PG (ref 26.5–33)
MCHC RBC AUTO-ENTMCNC: 32.1 G/DL (ref 31.5–36.5)
MCV RBC AUTO: 64 FL (ref 78–100)
PLATELET # BLD AUTO: 107 10E9/L (ref 150–450)
POTASSIUM SERPL-SCNC: 4.6 MMOL/L (ref 3.4–5.3)
RBC # BLD AUTO: 5.82 10E12/L (ref 4.4–5.9)
SODIUM SERPL-SCNC: 138 MMOL/L (ref 133–144)
WBC # BLD AUTO: 3.5 10E9/L (ref 4–11)

## 2017-02-01 PROCEDURE — T1013 SIGN LANG/ORAL INTERPRETER: HCPCS | Mod: U3

## 2017-02-01 PROCEDURE — 36415 COLL VENOUS BLD VENIPUNCTURE: CPT | Performed by: INTERNAL MEDICINE

## 2017-02-01 PROCEDURE — A9270 NON-COVERED ITEM OR SERVICE: HCPCS | Mod: GY | Performed by: HOSPITALIST

## 2017-02-01 PROCEDURE — 85027 COMPLETE CBC AUTOMATED: CPT | Performed by: INTERNAL MEDICINE

## 2017-02-01 PROCEDURE — 25000125 ZZHC RX 250: Performed by: HOSPITALIST

## 2017-02-01 PROCEDURE — 40000275 ZZH STATISTIC RCP TIME EA 10 MIN

## 2017-02-01 PROCEDURE — 94640 AIRWAY INHALATION TREATMENT: CPT

## 2017-02-01 PROCEDURE — 80048 BASIC METABOLIC PNL TOTAL CA: CPT | Performed by: INTERNAL MEDICINE

## 2017-02-01 PROCEDURE — 99239 HOSP IP/OBS DSCHRG MGMT >30: CPT | Performed by: INTERNAL MEDICINE

## 2017-02-01 PROCEDURE — 25000132 ZZH RX MED GY IP 250 OP 250 PS 637: Mod: GY | Performed by: HOSPITALIST

## 2017-02-01 RX ORDER — OSELTAMIVIR PHOSPHATE 30 MG/1
30 CAPSULE ORAL 2 TIMES DAILY
Qty: 4 CAPSULE | Refills: 0 | Status: SHIPPED | OUTPATIENT
Start: 2017-02-01 | End: 2017-02-06

## 2017-02-01 RX ORDER — LEVOFLOXACIN 500 MG/1
500 TABLET, FILM COATED ORAL DAILY
Qty: 4 TABLET | Refills: 0 | Status: SHIPPED | OUTPATIENT
Start: 2017-02-01 | End: 2017-02-06

## 2017-02-01 RX ADMIN — ASPIRIN 325 MG ORAL TABLET 325 MG: 325 PILL ORAL at 09:23

## 2017-02-01 RX ADMIN — METOPROLOL TARTRATE 25 MG: 25 TABLET, FILM COATED ORAL at 09:23

## 2017-02-01 RX ADMIN — FERROUS SULFATE TAB 325 MG (65 MG ELEMENTAL FE) 325 MG: 325 (65 FE) TAB at 09:23

## 2017-02-01 RX ADMIN — IPRATROPIUM BROMIDE AND ALBUTEROL SULFATE 3 ML: .5; 3 SOLUTION RESPIRATORY (INHALATION) at 08:25

## 2017-02-01 RX ADMIN — OSELTAMIVIR PHOSPHATE 30 MG: 30 CAPSULE ORAL at 09:23

## 2017-02-01 RX ADMIN — MONTELUKAST 10 MG: 10 TABLET, FILM COATED ORAL at 09:23

## 2017-02-01 RX ADMIN — BUDESONIDE 0.5 MG: 0.5 INHALANT RESPIRATORY (INHALATION) at 08:22

## 2017-02-01 NOTE — PLAN OF CARE
Problem: Goal Outcome Summary  Goal: Goal Outcome Summary  Outcome: Completed Date Met:  02/01/17  Patient had VSS, denied any chest pain and SOB. Patient on RA, sats in the low 90's. LS are diminished. Patient independent in room. Infrequent cough, no sputum. Son called and updated and will  patient at 1300 today. IV access taken out and tip intact. All discharge instructions explained to patient with help of .

## 2017-02-01 NOTE — PLAN OF CARE
Problem: Goal Outcome Summary  Goal: Goal Outcome Summary  Outcome: No Change  Alert & oriented, SBA to bathroom, droplet isolation, [place], sign language available, lung sounds diminished/ exp.wheezes, denies pain, complain of shortness of breath and decreased coughing. On 2 liters oxygen, saturation in mid 90's. Possible discharge today.

## 2017-02-01 NOTE — PLAN OF CARE
Problem: Goal Outcome Summary  Goal: Goal Outcome Summary  Alert and oriented x 4. Very [place];  utilized for part of mukesh shift. VSS on 2L O2, with sats of 96%. LS coarse with expiratory wheezing throughout. C/o pain s/t coughing; received PRN tylenol. Denied nausea. SOB at times, when coughing. Productive cough, at times. Sched and PRN neb treatments. PO tamiflu; IV antibiotic. Droplet isolation. Tolerating 2gram NA diet. Up with SBA. Voiding adequately. Urine sample sent to lab; results pending. Possible d/c tomorrow. Nursing will continue to monitor.

## 2017-02-01 NOTE — PROGRESS NOTES
Patient had VSS, denied any chest pain and SOB. Patient on RA, sats in the low 90's. LS are diminished. Patient independent in room. Infrequent cough, no sputum. Son called and updated and will  patient at 1300 today. IV access taken out and tip intact. All discharge instructions explained to patient with help of .

## 2017-02-01 NOTE — PROGRESS NOTES
Patient on 2L NC. LS coarse with expiratory wheezes. SpO2 mid 90's. Neb tx given as ordered. Will continue to follow.    Kacey TAYLOR

## 2017-02-01 NOTE — PROGRESS NOTES
SW:  D:  Patient is discharged today.  Spoke with nursing who reports patient is up independently and off oxygen.  PT evaluated reviewed. Based on information, no further SW involvement is indicated.

## 2017-02-02 ENCOUNTER — TELEPHONE (OUTPATIENT)
Dept: FAMILY MEDICINE | Facility: CLINIC | Age: 82
End: 2017-02-02

## 2017-02-02 NOTE — DISCHARGE SUMMARY
DATE OF ADMISSION:  01/29/2017      DATE OF DISCHARGE:  02/01/2017      PRIMARY CARE PHYSICIAN:  None given.        Shiraz Ingram is an 82-year-old male admitted with shortness of breath and a cough.  The swab for influenza A was positive and chest x-ray demonstrated pneumonia of the left lower lung lobe.  The patient was treated with Levaquin and Tamiflu.  He was given DuoNebs during his hospitalization.  He was hypoxic on presentation with an oxygen saturation of 84% on room air.  He gradually improved, was weaned off of oxygen and was then discharged to home.      DIAGNOSES AT TIME OF DISCHARGE:   1.  Left lower lobe community-acquired pneumonia.   2.  Influenza A.     3.  Moderate persistent asthma.   4.  Acute hypoxic respiratory failure.   5.  Hypertension.    6.  Hyperlipidemia.    7.  Chronic kidney disease stage 3.    8.  Coronary artery disease, status post CABG in 2002.     9.  Mild aortic stenosis.      DISCHARGE MEDICATIONS:   1.  Tamiflu 30 mg twice daily.   2.  Levaquin 500 mg daily.   3.  Multivitamin 1 tab daily.   4.  Iron 325 mg daily.   5.  Aspirin 325 mg daily.   6.  Norco 1-2 tabs every 4 hours as needed.   7.  Zocor 40 mg daily.   8.  Lisinopril 20 mg daily.   9.  Metoprolol 25 mg twice daily.   10.  Pulmicort 0.5 mg by neb twice daily.   11.  Singular 10 mg daily.   12.  DuoNeb 4 times daily.   13.  Albuterol MDI every 4 hours as needed.   14.  Calcium plus vitamin D 1 tab daily.      PHYSICAL EXAM AT TIME OF DISCHARGE:   VITAL SIGNS:  Temperature 98.4, pulse 65, respiratory rate 18, blood pressure 138/78, satting 94% on room air.   GENERAL:  Well-nourished, well-developed elderly male sitting comfortably in bed.   HEENT:  Head is atraumatic, normocephalic.   NECK:  Supple without any lymphadenopathy.   LUNGS:  Demonstrate few scattered rhonchi.   HEART:  Regular rate and rhythm, 2/6 systolic ejection murmur.   ABDOMEN:  Bowel sounds positive.  Soft, nontender, nondistended, no  hepatosplenomegaly.   EXTREMITIES:  Without clubbing, cyanosis or edema.      DISPOSITION:  The patient will be discharged to home.      FOLLOWUP:  Primary care physician next week.      Greater than 30 minutes discharge planning were spent with this patient.         XIANG SMITH MD             D: 2017 13:09   T: 2017 13:56   MT: [place]      Name:     SRIKANTH OBANDO   MRN:      [id number]        Account:        [id number]   :      1934           Admit Date:                                       Discharge Date: 2017      Document: [id number]

## 2017-02-02 NOTE — TELEPHONE ENCOUNTER
"ED / Discharge Outreach Protocol    Patient Contact    Attempt # 1    Was call answered?  No.  Unable to leave message. \"mail box is full.\"    Sarah Santacruz RN      "

## 2017-02-03 NOTE — TELEPHONE ENCOUNTER
ED / Discharge Outreach Protocol    Patient Contact    Attempt # 2    Was call answered?  No.  Unable to leave message.  VM is full.  /JOSE Fregoso

## 2017-02-04 LAB
BACTERIA SPEC CULT: NO GROWTH
BACTERIA SPEC CULT: NO GROWTH
Lab: NORMAL
Lab: NORMAL
MICRO REPORT STATUS: NORMAL
MICRO REPORT STATUS: NORMAL
SPECIMEN SOURCE: NORMAL
SPECIMEN SOURCE: NORMAL

## 2017-02-06 ENCOUNTER — OFFICE VISIT (OUTPATIENT)
Dept: FAMILY MEDICINE | Facility: CLINIC | Age: 82
End: 2017-02-06
Payer: MEDICARE

## 2017-02-06 VITALS
BODY MASS INDEX: 24.63 KG/M2 | WEIGHT: 162.5 LBS | DIASTOLIC BLOOD PRESSURE: 86 MMHG | TEMPERATURE: 98.1 F | SYSTOLIC BLOOD PRESSURE: 138 MMHG | OXYGEN SATURATION: 96 % | HEIGHT: 68 IN | HEART RATE: 68 BPM

## 2017-02-06 DIAGNOSIS — J09.X1 INFLUENZA A WITH PNEUMONIA: Primary | ICD-10-CM

## 2017-02-06 DIAGNOSIS — J45.30 MILD PERSISTENT ASTHMA WITHOUT COMPLICATION: ICD-10-CM

## 2017-02-06 PROCEDURE — 99495 TRANSJ CARE MGMT MOD F2F 14D: CPT | Performed by: FAMILY MEDICINE

## 2017-02-06 NOTE — PROGRESS NOTES
SUBJECTIVE:                                                    Shiraz Ingram is a 82 year old male who presents to clinic today for the following health issues:          Hospital Follow-up Visit:    Hospital/Nursing Home/IP Rehab Facility: [facility]  Date of Admission: 01/29/2017  Date of Discharge: 02/01/2017  Reason(s) for Admission: Influenza, Pneumonia due to infectious organism, unspecified laterality, unspecified part of lung and Hypoxia            Problems taking medications regularly:  None       Medication changes since discharge: levofloxacin 500mg for 4 days and oseltamivir 30mg        Problems adhering to non-medication therapy:  None    Summary of hospitalization:  [facility] discharge summary reviewed  Diagnostic Tests/Treatments reviewed.  Follow up needed: none  Other Healthcare Providers Involved in Patient s Care:         None  Update since discharge: improved.      Post Discharge Medication Reconciliation: discharge medications reconciled, continue medications without change.  Plan of care communicated with patient     Coding guidelines for this visit:  Type of Medical   Decision Making Face-to-Face Visit       within 7 Days of discharge Face-to-Face Visit        within 14 days of discharge   Moderate Complexity [id number]   High Complexity [id number]          Here with sign .     Feels 80% better than last time when he was in the hospital.     Was diagnosed with pneumonia and flu. Completed tamiful and antibiotics.     Using inhalers for asthma as prescribed. Still has some wheezing. No cough.     Problem list and histories reviewed & adjusted, as indicated.  Additional history: as documented    Problem list, Medication list, Allergies, and Medical/Social/Surgical histories reviewed in [facility] and updated as appropriate.      Social History     Social History     Marital Status:      Spouse Name: Kaiely     Number of Children: 3     Years of Education:  N/A     Occupational History     upholstery Retired     Social History Main Topics     Smoking status: Never Smoker      Smokeless tobacco: Never Used     Alcohol Use: No     Drug Use: No     Sexual Activity:     Partners: Female     Other Topics Concern      Service No     Blood Transfusions No     Exercise Yes     Seat Belt Yes     Self-Exams No     Parent/Sibling W/ Cabg, Mi Or Angioplasty Before 65f 55m? Yes     Social History Narrative    Balanced Diet - Yes    Osteoporosis Preventative measures-  Dairy servings per day: 2 to 3 servings daily    Regular Exercise -  Yes Describe walks 1.5 mile daily     Dental Exam up - YES - Date: 2 years ago    Eye Exam - YES - Date: 1 year ago    Self Testicular Exam -  No, handout given    Do you have any concerns about STD's -  No    Abuse: Current or Past (Physical, Sexual or Emotional)- No    Do you feel safe in your environment - Yes    Guns stored in the home - Yes, locked    Sunscreen used - No    Seatbelts used - Yes    Lipids - YES - Date: 3-09    Glucose -  YES - Date: 3-09    Colon Cancer Screening - Colonoscopy 3-08(date completed)    Hemoccults - UNKNOWN    PSA - YES - Date: 11-07    Digital Rectal Exam - UNKNOWN    Immunizations reviewed and up to date - Yes, td 1-2005, had shingles vaccine    5-28-09  JANEY Patel CMA                     Allergies   Allergen Reactions     No Known Drug Allergies      Patient Active Problem List   Diagnosis     Coronary atherosclerosis     Benign neoplasm of colon     Localized osteoarthrosis, lower leg     Allergic rhinitis     Moderate persistent asthma     Anemia     Osteoporosis     Hypertension goal BP (blood pressure) < 140/90     Hyperlipidemia with target LDL less than 100     CKD (chronic kidney disease) stage 3, GFR 30-59 ml/min     Aortic stenosis, mild     Advanced directives, counseling/discussion     Iron deficiency anemia     Vitamin B 12 deficiency     Health Care Home     Aortic stenosis     Mild persistent  "asthma without complication     Influenza A     Shortness of breath     Influenza A with pneumonia     Reviewed medications, social history and  past medical and surgical history.    Review of system: for general, respiratory, CVS, GI and psychiatry negative except for noted above.     EXAM:  /86 mmHg  Pulse 68  Temp(Src) 98.1  F (36.7  C) (Oral)  Ht 5' 8\" (1.727 m)  Wt 162 lb 8 oz (73.71 kg)  BMI 24.71 kg/m2  SpO2 96%  Constitutional: healthy, alert and no distress   Psychiatric: mentation appears normal and affect normal/bright  Cardiovascular: RRR. No murmurs,  Respiratory: right lung field - mild wheezing present, no crackles.     ASSESSMENT / PLAN:  (J09.X1) Influenza A with pneumonia  (primary encounter diagnosis)  Comment: reviewed hospital notes.  Plan: recovering well. antibiotics and antiviral course completed.     (J45.30) Mild persistent asthma without complication  Comment:  Using pulmicort twice per day and using duoneb 4 times per day  Plan: continue same for now. Once better, ok to switch duoneb to PRN. He understood.            "

## 2017-02-06 NOTE — NURSING NOTE
"Chief Complaint   Patient presents with     Hospital F/U       Initial /86 mmHg  Pulse 68  Temp(Src) 98.1  F (36.7  C) (Oral)  Ht 5' 8\" (1.727 m)  Wt 162 lb 8 oz (73.71 kg)  BMI 24.71 kg/m2  SpO2 96% Estimated body mass index is 24.71 kg/(m^2) as calculated from the following:    Height as of this encounter: 5' 8\" (1.727 m).    Weight as of this encounter: 162 lb 8 oz (73.71 kg).  Medication Reconciliation: complete     Angela Workman, NIMA      "

## 2017-03-22 ENCOUNTER — TELEPHONE (OUTPATIENT)
Dept: FAMILY MEDICINE | Facility: CLINIC | Age: 82
End: 2017-03-22

## 2017-03-22 DIAGNOSIS — D64.9 ANEMIA, UNSPECIFIED TYPE: Primary | ICD-10-CM

## 2017-03-22 RX ORDER — FERROUS SULFATE 325(65) MG
325 TABLET ORAL 2 TIMES DAILY
Qty: 180 TABLET | Refills: 3 | Status: SHIPPED | OUTPATIENT
Start: 2017-03-22 | End: 2018-03-23

## 2017-03-22 NOTE — TELEPHONE ENCOUNTER
Medication Detail      Disp Refills Start End DAW   FERROUS SULFATE PO     --   Sig: Take 325 mg by mouth daily       Last Office Visit with [facility] provider: 2/6/17  Future Office visit:    Next 5 appointments (look out 90 days)     Jun 13, 2017  8:00 AM CDT   [facility]    [address] [place] [id number] [phone]                   Routing refill request to provider for review/approval because:  Drug not on the [facility], [facility] or [facility] refill protocol or controlled substance

## 2017-05-18 DIAGNOSIS — I10 ESSENTIAL HYPERTENSION WITH GOAL BLOOD PRESSURE LESS THAN 140/90: ICD-10-CM

## 2017-05-18 DIAGNOSIS — E78.5 HYPERLIPIDEMIA WITH TARGET LDL LESS THAN 100: ICD-10-CM

## 2017-05-18 NOTE — TELEPHONE ENCOUNTER
lisinopril (PRINIVIL,ZESTRIL) 20 MG tablet      Last Written Prescription Date: 8/19/16  Last Fill Quantity: 90, # refills: 2  Last Office Visit with [facility] or [facility] prescribing provider: 2/6/17  Next 5 appointments (look out 90 days)     Jun 13, 2017  8:00 AM CDT   Office Visit with Dany Rodriguez MD   [facility] ([facility])    [address] [place] [id number] [phone]                   Potassium   Date Value Ref Range Status   02/01/2017 4.6 3.4 - 5.3 mmol/L Final     Creatinine   Date Value Ref Range Status   02/01/2017 1.39 (H) 0.66 - 1.25 mg/dL Final     BP Readings from Last 3 Encounters:   02/06/17 138/86   02/01/17 138/78   12/13/16 124/60       simvastatin (ZOCOR) 40 MG tablet     Last Written Prescription Date: 8/20/16  Last Fill Quantity: 90, # refills: 2  Last Office Visit with [facility], [facility] or [facility] prescribing provider: 2/6/17  Next 5 appointments (look out 90 days)     Jun 13, 2017  8:00 AM CDT   Office Visit with Dany Rodriguez MD   [facility] ([facility])    [address] [place] [id number] [phone]                   Lab Results   Component Value Date    CHOL 116 06/13/2016     Lab Results   Component Value Date    HDL 40 06/13/2016     Lab Results   Component Value Date    LDL 58 06/13/2016     Lab Results   Component Value Date    TRIG 89 06/13/2016     Lab Results   Component Value Date    CHOLHDLRATIO 3.0 07/07/2015

## 2017-05-19 RX ORDER — LISINOPRIL 20 MG/1
TABLET ORAL
Qty: 90 TABLET | Refills: 0 | Status: SHIPPED | OUTPATIENT
Start: 2017-05-19 | End: 2017-06-13

## 2017-05-19 RX ORDER — SIMVASTATIN 40 MG
TABLET ORAL
Qty: 90 TABLET | Refills: 0 | Status: SHIPPED | OUTPATIENT
Start: 2017-05-19 | End: 2017-06-13

## 2017-05-19 NOTE — TELEPHONE ENCOUNTER
Dr. Rodriguez -- please review.     Simvastatin approved per protocol.     Lisinopril -- creatinine elevated.    Please sign/advise.    Thank you  KATHLEEN PerezN, RN  [facility]

## 2017-06-13 ENCOUNTER — OFFICE VISIT (OUTPATIENT)
Dept: FAMILY MEDICINE | Facility: CLINIC | Age: 82
End: 2017-06-13
Payer: MEDICARE

## 2017-06-13 VITALS
HEART RATE: 62 BPM | SYSTOLIC BLOOD PRESSURE: 132 MMHG | TEMPERATURE: 97.4 F | DIASTOLIC BLOOD PRESSURE: 72 MMHG | OXYGEN SATURATION: 97 % | BODY MASS INDEX: 25.12 KG/M2 | HEIGHT: 68 IN | WEIGHT: 165.75 LBS

## 2017-06-13 DIAGNOSIS — J45.30 MILD PERSISTENT ASTHMA WITHOUT COMPLICATION: ICD-10-CM

## 2017-06-13 DIAGNOSIS — E78.5 HYPERLIPIDEMIA WITH TARGET LDL LESS THAN 100: ICD-10-CM

## 2017-06-13 DIAGNOSIS — I10 HYPERTENSION GOAL BP (BLOOD PRESSURE) < 140/90: Primary | ICD-10-CM

## 2017-06-13 DIAGNOSIS — H93.11 RIGHT-SIDED TINNITUS: ICD-10-CM

## 2017-06-13 PROBLEM — J10.1 INFLUENZA A: Status: RESOLVED | Noted: 2017-01-29 | Resolved: 2017-06-13

## 2017-06-13 PROBLEM — J09.X1 INFLUENZA A WITH PNEUMONIA: Status: RESOLVED | Noted: 2017-01-29 | Resolved: 2017-06-13

## 2017-06-13 PROBLEM — R06.02 SHORTNESS OF BREATH: Status: RESOLVED | Noted: 2017-01-29 | Resolved: 2017-06-13

## 2017-06-13 PROCEDURE — 36415 COLL VENOUS BLD VENIPUNCTURE: CPT | Performed by: FAMILY MEDICINE

## 2017-06-13 PROCEDURE — 99214 OFFICE O/P EST MOD 30 MIN: CPT | Performed by: FAMILY MEDICINE

## 2017-06-13 PROCEDURE — 82043 UR ALBUMIN QUANTITATIVE: CPT | Performed by: FAMILY MEDICINE

## 2017-06-13 PROCEDURE — 80061 LIPID PANEL: CPT | Performed by: FAMILY MEDICINE

## 2017-06-13 RX ORDER — SIMVASTATIN 40 MG
TABLET ORAL
Qty: 90 TABLET | Refills: 0 | Status: SHIPPED | OUTPATIENT
Start: 2017-08-14 | End: 2017-12-12

## 2017-06-13 RX ORDER — LISINOPRIL 20 MG/1
TABLET ORAL
Qty: 90 TABLET | Refills: 1 | Status: SHIPPED | OUTPATIENT
Start: 2017-08-13 | End: 2017-12-12

## 2017-06-13 NOTE — PROGRESS NOTES
SUBJECTIVE:                                                    Shiraz Ingram is a 82 year old male who presents to clinic today for the following health issues:    Hyperlipidemia Follow-Up      Rate your low fat/cholesterol diet?: good    Taking statin?  Yes, no muscle aches from statin    Other lipid medications/supplements?:  none     Hypertension Follow-up      Outpatient blood pressures are being checked at store.  Results are 174/79.    Low Salt Diet: no added salt     Asthma Follow-Up    Was ACT completed today?    Yes    ACT Total Scores 6/13/2017   ACT TOTAL SCORE (Goal Greater than or Equal to 20) 20   In the past 12 months, how many times did you visit the emergency room for your asthma without being admitted to the hospital? 0   In the past 12 months, how many times were you hospitalized overnight because of your asthma? 0       Recent asthma triggers that patient is dealing with: grass and trees    Amount of exercise or physical activity: 6-7 days/week for an average of 15-30 minutes    Problems taking medications regularly: No    Medication side effects: none    Diet: low fat/cholesterol and vegetarian/vegan    Early morning - right ear starts ringing. Has hearing loss both ears. Started this year.   3 yrs ago - had hearing aids but did not see ENT at that time. Does not hear almost with left ear. Right ear - some hearing with hearing aids.     No change in asthma medication. Had recently test (spirometry) done and it was fine as per patient.     Here with     Problem list and histories reviewed & adjusted, as indicated.  Additional history: as documented    Labs reviewed in EPIC.     Reviewed and updated as needed this visit by clinical staff  Tobacco  Allergies  Meds  Med Hx  Surg Hx  Fam Hx  Soc Hx      Reviewed and updated as needed this visit by Provider      Social History     Social History     Marital status:      Spouse name: Kailey     Number of children:  3     Years of education: N/A     Occupational History     upholstery Retired     Social History Main Topics     Smoking status: Never Smoker     Smokeless tobacco: Never Used     Alcohol use No     Drug use: No     Sexual activity: Yes     Partners: Female     Other Topics Concern      Service No     Blood Transfusions No     Exercise Yes     Seat Belt Yes     Self-Exams No     Parent/Sibling W/ Cabg, Mi Or Angioplasty Before 65f 55m? Yes     Social History Narrative    Balanced Diet - Yes    Osteoporosis Preventative measures-  Dairy servings per day: 2 to 3 servings daily    Regular Exercise -  Yes Describe walks 1.5 mile daily     Dental Exam up - YES - Date: 2 years ago    Eye Exam - YES - Date: 1 year ago    Self Testicular Exam -  No, handout given    Do you have any concerns about STD's -  No    Abuse: Current or Past (Physical, Sexual or Emotional)- No    Do you feel safe in your environment - Yes    Guns stored in the home - Yes, locked    Sunscreen used - No    Seatbelts used - Yes    Lipids - YES - Date: 3-09    Glucose -  YES - Date: 3-09    Colon Cancer Screening - Colonoscopy 3-08(date completed)    Hemoccults - UNKNOWN    PSA - YES - Date: 11-07    Digital Rectal Exam - UNKNOWN    Immunizations reviewed and up to date - Yes, td 1-2005, had shingles vaccine    5-28-09  JANEY Patel CMA                     Allergies   Allergen Reactions     No Known Drug Allergies      Patient Active Problem List   Diagnosis     Coronary atherosclerosis     Benign neoplasm of colon     Localized osteoarthrosis, lower leg     Allergic rhinitis     Moderate persistent asthma     Anemia     Osteoporosis     Hypertension goal BP (blood pressure) < 140/90     Hyperlipidemia with target LDL less than 100     CKD (chronic kidney disease) stage 3, GFR 30-59 ml/min     Aortic stenosis, mild     Advanced directives, counseling/discussion     Iron deficiency anemia     Vitamin B 12 deficiency     Health Care Home     Aortic  "stenosis     Mild persistent asthma without complication     Reviewed medications, social history and  past medical and surgical history.    Review of system: for general, respiratory, CVS, GI and psychiatry negative except for noted above.     EXAM:  /72 (Cuff Size: Adult Regular)  Pulse 62  Temp 97.4  F (36.3  C) (Oral)  Ht 5' 8\" (1.727 m)  Wt 165 lb 12 oz (75.2 kg)  SpO2 97%  BMI 25.2 kg/m2  Constitutional: healthy, alert and no distress   Psychiatric: mentation appears normal and affect normal/bright  Cardiovascular: RRR. No murmurs,  Respiratory: negative, Lungs clear. No crackles or wheezing. No tachypnea.   Head: Normocephalic. No masses, lesions, tenderness or abnormalities  Neck: Neck supple. No adenopathy.    ENT: right ear hearing aid. Both ear canal with mild wax.     ASSESSMENT / PLAN:  (I10) Hypertension goal BP (blood pressure) < 140/90  (primary encounter diagnosis)  Comment: Patient is tolerating current medication without any major side effects of concerns and current dose seems reasonable too.  Current medication regime is effective. Continue current treatment without any changes.   Plan: Albumin Random Urine Quantitative             (E78.5) Hyperlipidemia with target LDL less than 100  Comment:  Patient is tolerating current medication without any major side effects of concerns and current dose seems reasonable too.  Current medication regime is effective. Continue current treatment without any changes.   Plan: Lipid panel reflex to direct LDL, simvastatin         (ZOCOR) 40 MG tablet             (J45.30) Mild persistent asthma without complication  Comment:  Patient is tolerating current medication without any major side effects of concerns and current dose seems reasonable too.  Current medication regime is effective. Continue current treatment without any changes.   Plan:      (H93.11) Right-sided tinnitus  Comment:  With chronic. hearing loss. No ENT follow up. Referral signed. "   Plan: OTOLARYNGOLOGY REFERRAL            follow up in 6 month.s

## 2017-06-13 NOTE — LETTER
[facility]   [address] [place]   [address]  [phone]    June 16, 2017      Shiraz Ingram  [address] [facility] [address]              Dear Mr. Ingram,    Cholesterol and urine tests are fine    Results for orders placed or performed in visit on 06/13/17   Lipid panel reflex to direct LDL   Result Value Ref Range    Cholesterol 124 <200 mg/dL    Triglycerides 84 <150 mg/dL    HDL Cholesterol 44 >39 mg/dL    LDL Cholesterol Calculated 63 <100 mg/dL    Non HDL Cholesterol 80 <130 mg/dL   Albumin Random Urine Quantitative   Result Value Ref Range    Creatinine Urine 92 mg/dL    Albumin Urine mg/L 6 mg/L    Albumin Urine mg/g Cr 6.65 0 - 17 mg/g Cr           Sincerely,    Dany Rodriguez MD/nr

## 2017-06-13 NOTE — NURSING NOTE
"Chief Complaint   Patient presents with     Hypertension     Lipids     Asthma       Initial /72 (Cuff Size: Adult Regular)  Pulse 62  Temp 97.4  F (36.3  C) (Oral)  Ht 5' 8\" (1.727 m)  Wt 165 lb 12 oz (75.2 kg)  SpO2 97%  BMI 25.2 kg/m2 Estimated body mass index is 25.2 kg/(m^2) as calculated from the following:    Height as of this encounter: 5' 8\" (1.727 m).    Weight as of this encounter: 165 lb 12 oz (75.2 kg).  Medication Reconciliation: complete     Angela Workman, NIMA      "

## 2017-06-13 NOTE — MR AVS SNAPSHOT
After Visit Summary   6/13/2017    Shiraz Ingram    MRN: [id number]           Patient Information     Date Of Birth          11/30/1934        Visit Information        Provider Department      6/13/2017 8:00 AM Blas Hauser Sandeep Kantilal, MD [facility]        Today's Diagnoses     Hypertension goal BP (blood pressure) < 140/90    -  1    Hyperlipidemia with target LDL less than 100        Mild persistent asthma without complication        Right-sided tinnitus           Follow-ups after your visit        Additional Services     OTOLARYNGOLOGY REFERRAL       Your provider has referred you to: N: [facility] [place] [facility] [phone]   [email or web address]  OR  [facility]: [place] Ot[facility] [phone]   [email or web address]    Please be aware that coverage of these services is subject to the terms and limitations of your health insurance plan.  Call member services at your health plan with any benefit or coverage questions.      Please bring the following with you to your appointment:    (1) Any X-Rays, CTs or MRIs which have been performed.  Contact the facility where they were done to arrange for  prior to your scheduled appointment.   (2) List of current medications  (3) This referral request   (4) Any documents/labs given to you for this referral                  Who to contact     If you have questions or need follow up information about today's clinic visit or your schedule please contact [facility] directly at [phone].  Normal or non-critical lab and imaging results will be communicated to you by MyChart, letter or phone within 4 business days after the clinic has received the results. If you do not hear from us within 7 days, please contact the clinic through MyChart or phone. If you have a critical or abnormal lab result, we will notify you by phone as soon as  "possible.  Submit refill requests through PulmOne or call your pharmacy and they will forward the refill request to us. Please allow 3 business days for your refill to be completed.          Additional Information About Your Visit        PulmOne Information     PulmOne lets you send messages to your doctor, view your test results, renew your prescriptions, schedule appointments and more. To sign up, go to [email or web address] . Click on \"Log in\" on the left side of the screen, which will take you to the Welcome page. Then click on \"Sign up Now\" on the right side of the page.     You will be asked to enter the access code listed below, as well as some personal information. Please follow the directions to create your username and password.     Your access code is: [email or web address]  Expires: 2017  8:57 AM     Your access code will  in 90 days. If you need help or a new code, please call your [place] clinic or [phone].        Care EveryWhere ID     This is your Care EveryWhere ID. This could be used by other organizations to access your [place] medical records  [id number]        Your Vitals Were     Pulse Temperature Height Pulse Oximetry BMI (Body Mass Index)       62 97.4  F (36.3  C) (Oral) 5' 8\" (1.727 m) 97% 25.2 kg/m2        Blood Pressure from Last 3 Encounters:   17 132/72   17 138/86   17 138/78    Weight from Last 3 Encounters:   17 165 lb 12 oz (75.2 kg)   17 162 lb 8 oz (73.7 kg)   17 168 lb (76.2 kg)              We Performed the Following     Albumin Random Urine Quantitative     Lipid panel reflex to direct LDL     OTOLARYNGOLOGY REFERRAL          Today's Medication Changes          These changes are accurate as of: 17  8:57 AM.  If you have any questions, ask your nurse or doctor.               Stop taking these medicines if you haven't already. Please contact your care team if you have questions.     HYDROcodone-acetaminophen 5-325 MG per " tablet   Commonly known as:  NORCO   Stopped by:  Dayn Rodriguez MD                    Primary Care Provider Office Phone # Fax #    Dany Rodriguez [phone]       [address] [address]        Thank you!     Thank you for choosing [facility]  for your care. Our goal is always to provide you with excellent care. Hearing back from our patients is one way we can continue to improve our services. Please take a few minutes to complete the written survey that you may receive in the mail after your visit with us. Thank you!             Your Updated Medication List - Protect others around you: Learn how to safely use, store and throw away your medicines at www.disposemymeds.org.          This list is accurate as of: 6/13/17  8:57 AM.  Always use your most recent med list.                   Brand Name Dispense Instructions for use    albuterol 108 (90 BASE) MCG/ACT Inhaler    albuterol    1 Inhaler    INHALE 1 TO 2 PUFFS EVERY 4 TO 6 HOURS AS NEEDED       ASPIRIN PO      Take 325 mg by mouth daily       budesonide 0.5 MG/2ML neb solution    PULMICORT    120 mL    Take 2 mLs (0.5 mg) by nebulization 2 times daily       CALTRATE 600 + D 600-200 MG-IU Tabs     30    TAKE 1 TABLET BY MOUTH DAILY       * FERROUS SULFATE PO      Take 325 mg by mouth daily       * ferrous sulfate 325 (65 FE) MG tablet    IRON    180 tablet    Take 1 tablet (325 mg) by mouth 2 times daily       ipratropium - albuterol 0.5 mg/2.5 mg/3 mL 0.5-2.5 (3) MG/3ML neb solution    DUONEB    360 mL    Inhale 1 vial (3 mLs) into the lungs 4 times daily       lisinopril 20 MG tablet    PRINIVIL/ZESTRIL    90 tablet    TAKE 1 TABLET(20 MG) BY MOUTH DAILY       metoprolol 50 MG tablet    LOPRESSOR    90 tablet    Take 0.5 tablets (25 mg) by mouth 2 times daily       montelukast 10 MG tablet    SINGULAIR    90 tablet    Take 1 tablet (10 mg) by mouth daily        MULTIVITAMIN PO      Take 1 tablet by mouth daily       simvastatin 40 MG tablet    ZOCOR    90 tablet    TAKE 1 TABLET(40 MG) BY MOUTH AT BEDTIME       * Notice:  This list has 2 medication(s) that are the same as other medications prescribed for you. Read the directions carefully, and ask your doctor or other care provider to review them with you.

## 2017-06-14 LAB
CHOLEST SERPL-MCNC: 124 MG/DL
CREAT UR-MCNC: 92 MG/DL
HDLC SERPL-MCNC: 44 MG/DL
LDLC SERPL CALC-MCNC: 63 MG/DL
MICROALBUMIN UR-MCNC: 6 MG/L
MICROALBUMIN/CREAT UR: 6.65 MG/G CR (ref 0–17)
NONHDLC SERPL-MCNC: 80 MG/DL
TRIGL SERPL-MCNC: 84 MG/DL

## 2017-06-14 ASSESSMENT — ASTHMA QUESTIONNAIRES: ACT_TOTALSCORE: 20

## 2017-07-18 DIAGNOSIS — I10 ESSENTIAL HYPERTENSION WITH GOAL BLOOD PRESSURE LESS THAN 140/90: ICD-10-CM

## 2017-07-18 NOTE — TELEPHONE ENCOUNTER
Medication Detail      Disp Refills Start End KAYCE   metoprolol (LOPRESSOR) 50 MG tablet 90 tablet 3 7/22/2016  No   Sig: Take 0.5 tablets (25 mg) by mouth 2 times daily       Last Office Visit with [facility], P or [facility] prescribing provider:  6/13/17   Future Office Visit:        BP Readings from Last 3 Encounters:   06/13/17 132/72   02/06/17 138/86   02/01/17 138/78

## 2017-07-20 RX ORDER — METOPROLOL TARTRATE 50 MG
TABLET ORAL
Qty: 90 TABLET | Refills: 3 | Status: SHIPPED | OUTPATIENT
Start: 2017-07-20 | End: 2018-05-01

## 2017-08-29 DIAGNOSIS — E78.5 HYPERLIPIDEMIA WITH TARGET LDL LESS THAN 100: ICD-10-CM

## 2017-08-30 RX ORDER — SIMVASTATIN 40 MG
TABLET ORAL
Qty: 90 TABLET | Refills: 0 | Status: SHIPPED | OUTPATIENT
Start: 2017-08-30 | End: 2017-11-24

## 2017-10-27 ENCOUNTER — ALLIED HEALTH/NURSE VISIT (OUTPATIENT)
Dept: NURSING | Facility: CLINIC | Age: 82
End: 2017-10-27
Payer: MEDICARE

## 2017-10-27 DIAGNOSIS — Z23 NEED FOR PROPHYLACTIC VACCINATION AND INOCULATION AGAINST INFLUENZA: Primary | ICD-10-CM

## 2017-10-27 PROCEDURE — 99207 ZZC NO CHARGE NURSE ONLY: CPT

## 2017-10-27 PROCEDURE — 90662 IIV NO PRSV INCREASED AG IM: CPT

## 2017-10-27 PROCEDURE — G0008 ADMIN INFLUENZA VIRUS VAC: HCPCS

## 2017-10-27 NOTE — MR AVS SNAPSHOT
After Visit Summary   10/27/2017    Shiraz Ingram    MRN: [id number]           Patient Information     Date Of Birth          11/30/1934        Visit Information        Provider Department      10/27/2017 9:45 AM ASL IS;  FLU CLINIC NURSE [facility]        Today's Diagnoses     Need for prophylactic vaccination and inoculation against influenza    -  1       Follow-ups after your visit        Your next 10 appointments already scheduled     Oct 27, 2017  9:45 AM CDT   Nurse Only with  FLU CLINIC NURSE   [facility] ([facility])    [address] [place] [address] [phone]            Dec 12, 2017  8:00 AM CST   Office Visit with Dany Rodriguez MD   [facility] ([facility])    [address] [place] [address] [phone]           Bring a current list of meds and any records pertaining to this visit. For Physicals, please bring immunization records and any forms needing to be filled out. Please arrive 10 minutes early to complete paperwork.              Who to contact     If you have questions or need follow up information about today's clinic visit or your schedule please contact [facility] directly at [phone].  Normal or non-critical lab and imaging results will be communicated to you by Pryvhart, letter or phone within 4 business days after the clinic has received the results. If you do not hear from us within 7 days, please contact the clinic through Pryvhart or phone. If you have a critical or abnormal lab result, we will notify you by phone as soon as possible.  Submit refill requests through LogicBay or call your pharmacy and they will forward the refill request to us. Please allow 3 business days for your refill to be completed.          Additional Information About Your Visit        LogicBay Information     LogicBay lets you send messages to your doctor,  "view your test results, renew your prescriptions, schedule appointments and more. To sign up, go to [email or web address] . Click on \"Log in\" on the left side of the screen, which will take you to the Welcome page. Then click on \"Sign up Now\" on the right side of the page.     You will be asked to enter the access code listed below, as well as some personal information. Please follow the directions to create your username and password.     Your access code is: [email or web address]  Expires: 2017 10:08 AM     Your access code will  in 90 days. If you need help or a new code, please call your [facility] clinic or [phone].        Care EveryWhere ID     This is your Care EveryWhere ID. This could be used by other organizations to access your [facility] medical records  [id number]         Blood Pressure from Last 3 Encounters:   17 132/72   17 138/86   17 138/78    Weight from Last 3 Encounters:   17 165 lb 12 oz (75.2 kg)   17 162 lb 8 oz (73.7 kg)   17 168 lb (76.2 kg)              We Performed the Following     FLU VACCINE, INCREASED ANTIGEN, PRESV FREE, AGE 65+ [64251]     Vaccine Administration, Initial [70713]        Primary Care Provider Office Phone # Fax #    Dany Evgeny Rodriguez [phone] [address] [place] [address]        Equal Access to Services     HOME ABBASI : Hadii aad ku hadasho Sohunter, waaxda luqadaha, qaybta kaalmada adeegyahoracio, anitra ellis. So [facility] [phone].    ATENCIÓN: Si habla español, tiene a alexander disposición servicios gratuitos de asistencia lingüística. Llame al [phone].    We comply with applicable federal civil rights laws and [place] laws. We do not discriminate on the basis of race, color, national origin, age, disability, sex, sexual orientation, or gender identity.            Thank you!     Thank you for choosing [facility]  for your care. Our goal is " always to provide you with excellent care. Hearing back from our patients is one way we can continue to improve our services. Please take a few minutes to complete the written survey that you may receive in the mail after your visit with us. Thank you!             Your Updated Medication List - Protect others around you: Learn how to safely use, store and throw away your medicines at www.disposemymeds.org.          This list is accurate as of: 10/27/17  9:44 AM.  Always use your most recent med list.                   Brand Name Dispense Instructions for use Diagnosis    albuterol 108 (90 BASE) MCG/ACT Inhaler    PROAIR HFA    1 Inhaler    INHALE 1 TO 2 PUFFS EVERY 4 TO 6 HOURS AS NEEDED    Moderate persistent asthma, uncomplicated       ASPIRIN PO      Take 325 mg by mouth daily        budesonide 0.5 MG/2ML neb solution    PULMICORT    120 mL    Take 2 mLs (0.5 mg) by nebulization 2 times daily    Moderate persistent asthma       CALTRATE 600 + D 600-200 MG-IU Tabs     30    TAKE 1 TABLET BY MOUTH DAILY    Osteoporosis, unspecified       * FERROUS SULFATE PO      Take 325 mg by mouth daily        * ferrous sulfate 325 (65 FE) MG tablet    IRON    180 tablet    Take 1 tablet (325 mg) by mouth 2 times daily    Anemia, unspecified type       ipratropium - albuterol 0.5 mg/2.5 mg/3 mL 0.5-2.5 (3) MG/3ML neb solution    DUONEB    360 mL    Inhale 1 vial (3 mLs) into the lungs 4 times daily    Moderate persistent asthma, uncomplicated       lisinopril 20 MG tablet    PRINIVIL/ZESTRIL    90 tablet    TAKE 1 TABLET(20 MG) BY MOUTH DAILY    Hypertension goal BP (blood pressure) < 140/90       metoprolol 50 MG tablet    LOPRESSOR    90 tablet    TAKE 1/2 TABLET(25 MG) BY MOUTH TWICE DAILY    Essential hypertension with goal blood pressure less than 140/90       montelukast 10 MG tablet    SINGULAIR    90 tablet    Take 1 tablet (10 mg) by mouth daily    Moderate persistent asthma       MULTIVITAMIN PO      Take 1 tablet by  mouth daily        * simvastatin 40 MG tablet    ZOCOR    90 tablet    TAKE 1 TABLET(40 MG) BY MOUTH AT BEDTIME    Hyperlipidemia with target LDL less than 100       * simvastatin 40 MG tablet    ZOCOR    90 tablet    TAKE 1 TABLET(40 MG) BY MOUTH AT BEDTIME    Hyperlipidemia with target LDL less than 100       * Notice:  This list has 4 medication(s) that are the same as other medications prescribed for you. Read the directions carefully, and ask your doctor or other care provider to review them with you.

## 2017-10-27 NOTE — PROGRESS NOTES

## 2017-11-24 DIAGNOSIS — E78.5 HYPERLIPIDEMIA WITH TARGET LDL LESS THAN 100: ICD-10-CM

## 2017-11-24 NOTE — TELEPHONE ENCOUNTER
Medication Detail      Disp Refills Start End KAYCE   simvastatin (ZOCOR) 40 MG tablet 90 tablet 0 8/30/2017  No   Sig: TAKE 1 TABLET(40 MG) BY MOUTH AT BEDTIME   Class: Local Print   Order: [id number]     LOV:6/13/2017

## 2017-11-28 RX ORDER — SIMVASTATIN 40 MG
TABLET ORAL
Qty: 90 TABLET | Refills: 1 | Status: SHIPPED | OUTPATIENT
Start: 2017-11-28 | End: 2018-05-01

## 2017-11-28 NOTE — TELEPHONE ENCOUNTER
Prescription approved per [facility] Refill Protocol.  MARY Velez, RN        Next 5 appointments (look out 90 days)     Dec 12, 2017  8:00 AM CST   Office Visit with Dany Rodriguez MD, Stephanie Linares   [facility] ([facility])    [address] [place] [address] [phone]                   Lab Results   Component Value Date    CHOL 124 06/13/2017     Lab Results   Component Value Date    HDL 44 06/13/2017     Lab Results   Component Value Date    LDL 63 06/13/2017     Lab Results   Component Value Date    TRIG 84 06/13/2017     Lab Results   Component Value Date    CHOLHDLRATIO 3.0 07/07/2015

## 2017-12-09 DIAGNOSIS — I10 HYPERTENSION GOAL BP (BLOOD PRESSURE) < 140/90: ICD-10-CM

## 2017-12-09 RX ORDER — LISINOPRIL 20 MG/1
TABLET ORAL
Qty: 90 TABLET | Refills: 0 | Status: CANCELLED | OUTPATIENT
Start: 2017-12-09

## 2017-12-11 NOTE — TELEPHONE ENCOUNTER
Medication Detail      Disp Refills Start End KAYCE   lisinopril (PRINIVIL/ZESTRIL) 20 MG tablet 90 tablet 1 8/13/2017  No   Sig: TAKE 1 TABLET(20 MG) BY MOUTH DAILY     lov 6/13/17

## 2017-12-12 ENCOUNTER — OFFICE VISIT (OUTPATIENT)
Dept: FAMILY MEDICINE | Facility: CLINIC | Age: 82
End: 2017-12-12
Payer: MEDICARE

## 2017-12-12 VITALS
WEIGHT: 167.5 LBS | RESPIRATION RATE: 16 BRPM | DIASTOLIC BLOOD PRESSURE: 84 MMHG | TEMPERATURE: 97.7 F | HEART RATE: 50 BPM | OXYGEN SATURATION: 98 % | BODY MASS INDEX: 25.47 KG/M2 | SYSTOLIC BLOOD PRESSURE: 136 MMHG

## 2017-12-12 DIAGNOSIS — N18.30 CKD (CHRONIC KIDNEY DISEASE) STAGE 3, GFR 30-59 ML/MIN (H): ICD-10-CM

## 2017-12-12 DIAGNOSIS — I10 HYPERTENSION GOAL BP (BLOOD PRESSURE) < 140/90: ICD-10-CM

## 2017-12-12 DIAGNOSIS — E78.5 HYPERLIPIDEMIA WITH TARGET LDL LESS THAN 100: ICD-10-CM

## 2017-12-12 DIAGNOSIS — J45.40 MODERATE PERSISTENT ASTHMA WITHOUT COMPLICATION: Primary | ICD-10-CM

## 2017-12-12 LAB
ANION GAP SERPL CALCULATED.3IONS-SCNC: 8 MMOL/L (ref 3–14)
BUN SERPL-MCNC: 34 MG/DL (ref 7–30)
CALCIUM SERPL-MCNC: 9.4 MG/DL (ref 8.5–10.1)
CHLORIDE SERPL-SCNC: 107 MMOL/L (ref 94–109)
CO2 SERPL-SCNC: 29 MMOL/L (ref 20–32)
CREAT SERPL-MCNC: 1.25 MG/DL (ref 0.66–1.25)
GFR SERPL CREATININE-BSD FRML MDRD: 55 ML/MIN/1.7M2
GLUCOSE SERPL-MCNC: 80 MG/DL (ref 70–99)
POTASSIUM SERPL-SCNC: 4.9 MMOL/L (ref 3.4–5.3)
SODIUM SERPL-SCNC: 144 MMOL/L (ref 133–144)

## 2017-12-12 PROCEDURE — 99214 OFFICE O/P EST MOD 30 MIN: CPT | Performed by: FAMILY MEDICINE

## 2017-12-12 PROCEDURE — 36415 COLL VENOUS BLD VENIPUNCTURE: CPT | Performed by: FAMILY MEDICINE

## 2017-12-12 PROCEDURE — T1013 SIGN LANG/ORAL INTERPRETER: HCPCS | Mod: U3 | Performed by: FAMILY MEDICINE

## 2017-12-12 PROCEDURE — 80048 BASIC METABOLIC PNL TOTAL CA: CPT | Performed by: FAMILY MEDICINE

## 2017-12-12 RX ORDER — LISINOPRIL 20 MG/1
TABLET ORAL
Qty: 90 TABLET | Refills: 1 | Status: SHIPPED | OUTPATIENT
Start: 2017-12-12 | End: 2018-06-13

## 2017-12-12 NOTE — MR AVS SNAPSHOT
"              After Visit Summary   12/12/2017    Shiraz Ingram    MRN: [id number]           Patient Information     Date Of Birth          11/30/1934        Visit Information        Provider Department      12/12/2017 8:00 AM Stephanie Linares; Dany Rodriguez MD Formerly named [facility]        Today's Diagnoses     Moderate persistent asthma without complication    -  1    Hypertension goal BP (blood pressure) < 140/90        Hyperlipidemia with target LDL less than 100        CKD (chronic kidney disease) stage 3, GFR 30-59 ml/min           Follow-ups after your visit        Your next 10 appointments already scheduled     Jun 12, 2018  8:00 AM CDT   SHORT with Dany Rodriguez MD   Formerly named [facility] (Formerly named [facility])    [address] [place] [address] [phone]              Who to contact     If you have questions or need follow up information about today's clinic visit or your schedule please contact [facility] directly at [phone].  Normal or non-critical lab and imaging results will be communicated to you by MyChart, letter or phone within 4 business days after the clinic has received the results. If you do not hear from us within 7 days, please contact the clinic through [employer] or phone. If you have a critical or abnormal lab result, we will notify you by phone as soon as possible.  Submit refill requests through [employer] or call your pharmacy and they will forward the refill request to us. Please allow 3 business days for your refill to be completed.          Additional Information About Your Visit        [employer] Information     [employer] lets you send messages to your doctor, view your test results, renew your prescriptions, schedule appointments and more. To sign up, go to [email or web address] . Click on \"Log in\" on the left side of the screen, which will take you to the Welcome page. Then click on \"Sign up Now\" on the right side of " the page.     You will be asked to enter the access code listed below, as well as some personal information. Please follow the directions to create your username and password.     Your access code is: [email or web address]  Expires: 3/12/2018 11:43 AM     Your access code will  in 90 days. If you need help or a new code, please call your [facility] clinic or [phone].        Care EveryWhere ID     This is your Care EveryWhere ID. This could be used by other organizations to access your [facility] medical records  [id number]        Your Vitals Were     Pulse Temperature Respirations Pulse Oximetry BMI (Body Mass Index)       50 97.7  F (36.5  C) (Oral) 16 98% 25.47 kg/m2        Blood Pressure from Last 3 Encounters:   17 136/84   17 132/72   17 138/86    Weight from Last 3 Encounters:   17 167 lb 8 oz (76 kg)   17 165 lb 12 oz (75.2 kg)   17 162 lb 8 oz (73.7 kg)              We Performed the Following     Basic metabolic panel          Today's Medication Changes          These changes are accurate as of: 17 11:43 AM.  If you have any questions, ask your nurse or doctor.               These medicines have changed or have updated prescriptions.        Dose/Directions    simvastatin 40 MG tablet   Commonly known as:  ZOCOR   This may have changed:  Another medication with the same name was removed. Continue taking this medication, and follow the directions you see here.   Used for:  Hyperlipidemia with target LDL less than 100   Changed by:  Dany Rodriguez MD        TAKE 1 TABLET BY MOUTH EVERY NIGHT AT BEDTIME   Quantity:  90 tablet   Refills:  1            Where to get your medicines      These medications were sent to [facility] [address] [place] [address] [address] [place] [address] AT [address] [address]     Phone:  [phone]     lisinopril 20 MG tablet                Primary Care Provider Office Phone # Fax  #    Dany Evgeny Rodriguez [phone]       [facility] [address] [place] [address]        Equal Access to Services     ROBB ABBASI : Hadii aad ku hadshiv Suarezhunter, kimmie bretandrew, kalenta kakate bateman, anitra quein hayaan leahtrina martinez roman ellis. So [facility] [place] [phone].    ATENCIÓN: Si habla español, tiene a alexander disposición servicios gratuitos de asistencia lingüística. Llame al [phone].    We comply with applicable federal civil rights laws and [place] laws. We do not discriminate on the basis of race, color, national origin, age, disability, sex, sexual orientation, or gender identity.            Thank you!     Thank you for choosing [facility]  for your care. Our goal is always to provide you with excellent care. Hearing back from our patients is one way we can continue to improve our services. Please take a few minutes to complete the written survey that you may receive in the mail after your visit with us. Thank you!             Your Updated Medication List - Protect others around you: Learn how to safely use, store and throw away your medicines at www.disposemymeds.org.          This list is accurate as of: 12/12/17 11:43 AM.  Always use your most recent med list.                   Brand Name Dispense Instructions for use Diagnosis    albuterol 108 (90 BASE) MCG/ACT Inhaler    PROAIR HFA    1 Inhaler    INHALE 1 TO 2 PUFFS EVERY 4 TO 6 HOURS AS NEEDED    Moderate persistent asthma, uncomplicated       ASPIRIN PO      Take 325 mg by mouth daily        budesonide 0.5 MG/2ML neb solution    PULMICORT    120 mL    Take 2 mLs (0.5 mg) by nebulization 2 times daily    Moderate persistent asthma       CALTRATE 600 + D 600-200 MG-IU Tabs     30    TAKE 1 TABLET BY MOUTH DAILY    Osteoporosis, unspecified       * FERROUS SULFATE PO      Take 325 mg by mouth daily        * ferrous sulfate 325 (65 FE) MG tablet    IRON    180 tablet    Take 1 tablet (325 mg) by mouth 2 times  daily    Anemia, unspecified type       ipratropium - albuterol 0.5 mg/2.5 mg/3 mL 0.5-2.5 (3) MG/3ML neb solution    DUONEB    360 mL    Inhale 1 vial (3 mLs) into the lungs 4 times daily    Moderate persistent asthma, uncomplicated       lisinopril 20 MG tablet    PRINIVIL/ZESTRIL    90 tablet    TAKE 1 TABLET(20 MG) BY MOUTH DAILY    Hypertension goal BP (blood pressure) < 140/90       metoprolol 50 MG tablet    LOPRESSOR    90 tablet    TAKE 1/2 TABLET(25 MG) BY MOUTH TWICE DAILY    Essential hypertension with goal blood pressure less than 140/90       montelukast 10 MG tablet    SINGULAIR    90 tablet    Take 1 tablet (10 mg) by mouth daily    Moderate persistent asthma       MULTIVITAMIN PO      Take 1 tablet by mouth daily        simvastatin 40 MG tablet    ZOCOR    90 tablet    TAKE 1 TABLET BY MOUTH EVERY NIGHT AT BEDTIME    Hyperlipidemia with target LDL less than 100       * Notice:  This list has 2 medication(s) that are the same as other medications prescribed for you. Read the directions carefully, and ask your doctor or other care provider to review them with you.

## 2017-12-12 NOTE — PROGRESS NOTES
SUBJECTIVE:   Shiraz Ingram is a 83 year old male who presents to clinic today for the following health issues:     Asthma Follow-Up    Was ACT completed today?    Yes    ACT Total Scores 12/12/2017   ACT TOTAL SCORE -   ASTHMA ER VISITS -   ASTHMA HOSPITALIZATIONS -   ACT TOTAL SCORE (Goal Greater than or Equal to 20) 17   In the past 12 months, how many times did you visit the emergency room for your asthma without being admitted to the hospital? 0   In the past 12 months, how many times were you hospitalized overnight because of your asthma? 0       Recent asthma triggers that patient is dealing with: smoke    Amount of exercise or physical activity: 2-3 days/week for an average of greater than 60 minutes    Problems taking medications regularly: No    Medication side effects: none    Diet: regular (no restrictions)    Fine with asthma medications. Using pulmicort twice per day. Needing rescue inhaler infrequently pending what he does.     Problem list and histories reviewed & adjusted, as indicated.  Additional history: as documented    Labs reviewed in EPIC.     Reviewed and updated as needed this visit by clinical staffTobacco  Allergies  Meds  Med Hx  Surg Hx  Fam Hx  Soc Hx      Reviewed and updated as needed this visit by Provider      Social History     Social History     Marital status:      Spouse name: Kailey     Number of children: 3     Years of education: N/A     Occupational History     upholstery Retired     Social History Main Topics     Smoking status: Never Smoker     Smokeless tobacco: Never Used     Alcohol use No     Drug use: No     Sexual activity: Yes     Partners: Female     Other Topics Concern      Service No     Blood Transfusions No     Exercise Yes     Seat Belt Yes     Self-Exams No     Parent/Sibling W/ Cabg, Mi Or Angioplasty Before 65f 55m? Yes     Social History Narrative    Balanced Diet - Yes    Osteoporosis Preventative measures-  Dairy servings per day:  2 to 3 servings daily    Regular Exercise -  Yes Describe walks 1.5 mile daily     Dental Exam up - YES - Date: 2 years ago    Eye Exam - YES - Date: 1 year ago    Self Testicular Exam -  No, handout given    Do you have any concerns about STD's -  No    Abuse: Current or Past (Physical, Sexual or Emotional)- No    Do you feel safe in your environment - Yes    Guns stored in the home - Yes, locked    Sunscreen used - No    Seatbelts used - Yes    Lipids - YES - Date: 3-09    Glucose -  YES - Date: 3-09    Colon Cancer Screening - Colonoscopy 3-08(date completed)    Hemoccults - UNKNOWN    PSA - YES - Date: 11-07    Digital Rectal Exam - UNKNOWN    Immunizations reviewed and up to date - Yes, td 1-2005, had shingles vaccine    5-28-09  JANEY Patel CMA                     Allergies   Allergen Reactions     No Known Drug Allergies      Patient Active Problem List   Diagnosis     Coronary atherosclerosis     Benign neoplasm of colon     Localized osteoarthrosis, lower leg     Allergic rhinitis     Moderate persistent asthma     Anemia     Osteoporosis     Hypertension goal BP (blood pressure) < 140/90     Hyperlipidemia with target LDL less than 100     CKD (chronic kidney disease) stage 3, GFR 30-59 ml/min     Aortic stenosis, mild     Advanced directives, counseling/discussion     Iron deficiency anemia     Vitamin B 12 deficiency     Health Care Home     Mild persistent asthma without complication     Reviewed medications, social history and  past medical and surgical history.    Review of system: for general, respiratory, CVS, GI and psychiatry negative except for noted above.     EXAM:  /84 (BP Location: Right arm, Patient Position: Sitting, Cuff Size: Adult Regular)  Pulse 50  Temp 97.7  F (36.5  C) (Oral)  Resp 16  Wt 167 lb 8 oz (76 kg)  SpO2 98%  BMI 25.47 kg/m2  Constitutional: healthy, alert and no distress   Psychiatric: mentation appears normal and affect normal/bright  Cardiovascular: RRR. Faint  systolic murmur  Respiratory: mild exp wheezing.      used.     ASSESSMENT / PLAN:  (J45.40) Moderate persistent asthma without complication  (primary encounter diagnosis)  Comment: his ACT score is low. He is feeling fine.   Plan: he is using duoneb 4 times per day on regular basis. I think it is OK to continue for now.     (I10) Hypertension goal BP (blood pressure) < 140/90  Comment: repeat bp is better. Continue same rx.   Plan: lisinopril (PRINIVIL/ZESTRIL) 20 MG tablet,         Basic metabolic panel           (E78.5) Hyperlipidemia with target LDL less than 100  Comment:    Plan:  On statin. Continue for now.     (N18.3) CKD (chronic kidney disease) stage 3, GFR 30-59 ml/min  Comment:    Plan: Basic metabolic panel    Follow up in 6 months and full sets of labs at that time.

## 2017-12-12 NOTE — LETTER
December 13, 2017      Shiraz Ingram  [address] [facility] [address]        Dear ,    We are writing to inform you of your test results.    Your kidney function is stable    Resulted Orders   Basic metabolic panel   Result Value Ref Range    Sodium 144 133 - 144 mmol/L    Potassium 4.9 3.4 - 5.3 mmol/L    Chloride 107 94 - 109 mmol/L    Carbon Dioxide 29 20 - 32 mmol/L    Anion Gap 8 3 - 14 mmol/L    Glucose 80 70 - 99 mg/dL      Comment:      Fasting specimen    Urea Nitrogen 34 (H) 7 - 30 mg/dL    Creatinine 1.25 0.66 - 1.25 mg/dL    GFR Estimate 55 (L) >60 mL/min/1.7m2      Comment:      Non  GFR Calc    GFR Estimate If Black 67 >60 mL/min/1.7m2      Comment:       GFR Calc    Calcium 9.4 8.5 - 10.1 mg/dL     If you have any questions or concerns, please call the clinic at the number listed above.     Sincerely,    Dany Rodriguez MD/nr

## 2017-12-13 ASSESSMENT — ASTHMA QUESTIONNAIRES: ACT_TOTALSCORE: 17

## 2017-12-20 ENCOUNTER — TELEPHONE (OUTPATIENT)
Dept: FAMILY MEDICINE | Facility: CLINIC | Age: 82
End: 2017-12-20

## 2017-12-20 NOTE — TELEPHONE ENCOUNTER
That is unusual. Please call insurance and find out if this is error or not. If they still do not cover it, ask for alternative statin. Thanks

## 2017-12-20 NOTE — TELEPHONE ENCOUNTER
PA is needed for Simvastatin 40 mg Tab (Zocor).    Plan:[phone]  Pt ID:[id number]    Dr. Rodriguez, would you like to start the PA process or change medication.      Go Fortune MA  '

## 2018-03-23 DIAGNOSIS — D64.9 ANEMIA, UNSPECIFIED TYPE: ICD-10-CM

## 2018-03-23 NOTE — TELEPHONE ENCOUNTER
Medication Detail      Disp Refills Start End KAYCE   ferrous sulfate (IRON) 325 (65 FE) MG tablet 180 tablet 3 3/22/2017  No   Sig: Take 1 tablet (325 mg) by mouth 2 times daily   Class: E-Prescribe   Route: Oral   Order: [id number]   E-Prescribing Status: Receipt confirmed by pharmacy (3/22/2017 11:26 AM CDT)       Last Office Visit: 12/12/2017  Future Office visit:    Next 5 appointments (look out 90 days)     Jun 12, 2018  8:00 AM CDT   SHORT with Dany Rodriguez MD   [facility] ([facility])    [address] [place] [address] [phone]                   Routing refill request to provider for review/approval because:  Drug not on the [facility], [facility] or [facility] refill protocol or controlled substance

## 2018-03-26 RX ORDER — FERROUS SULFATE 325(65) MG
TABLET ORAL
Qty: 180 TABLET | Refills: 0 | Status: SHIPPED | OUTPATIENT
Start: 2018-03-26 | End: 2018-04-18

## 2018-05-01 DIAGNOSIS — I10 ESSENTIAL HYPERTENSION WITH GOAL BLOOD PRESSURE LESS THAN 140/90: ICD-10-CM

## 2018-05-01 DIAGNOSIS — J45.40 MODERATE PERSISTENT ASTHMA: ICD-10-CM

## 2018-05-01 DIAGNOSIS — E78.5 HYPERLIPIDEMIA WITH TARGET LDL LESS THAN 100: ICD-10-CM

## 2018-05-01 RX ORDER — METOPROLOL TARTRATE 50 MG
TABLET ORAL
Qty: 15 TABLET | Refills: 2 | Status: SHIPPED | OUTPATIENT
Start: 2018-05-01 | End: 2018-05-01

## 2018-05-01 RX ORDER — SIMVASTATIN 40 MG
40 TABLET ORAL AT BEDTIME
Qty: 30 TABLET | Refills: 1 | Status: SHIPPED | OUTPATIENT
Start: 2018-05-01 | End: 2018-06-13

## 2018-05-01 RX ORDER — METOPROLOL TARTRATE 50 MG
TABLET ORAL
Qty: 30 TABLET | Refills: 2 | Status: SHIPPED | OUTPATIENT
Start: 2018-05-01 | End: 2018-06-13

## 2018-05-01 RX ORDER — MONTELUKAST SODIUM 10 MG/1
1 TABLET ORAL DAILY
Qty: 30 TABLET | Refills: 1 | Status: SHIPPED | OUTPATIENT
Start: 2018-05-01 | End: 2018-07-03

## 2018-05-01 NOTE — TELEPHONE ENCOUNTER
I sent in the existing rx left on metoprolol for 30 day supply.  Sent the other 2 meds to refill prot.  High priority, since pt is out of med.  JOSE Fregoso

## 2018-05-01 NOTE — TELEPHONE ENCOUNTER
12/12/17 was last ov.  Asthma was addressed at ov.  Montelukast and simvastatin filled per prot.  Tried to inform pt.  Vm is full.    JOSE Fregoso

## 2018-05-01 NOTE — TELEPHONE ENCOUNTER
Reason for Call:  Medication or medication refill:    Do you use a [facility]?  Name of the pharmacy and phone number for the current request:  [facility] [id number] [address] [place] AT [address]    Name of the medication requested:  - simvastatin (ZOCOR) 40 MG tablet  - metoprolol (LOPRESSOR) 50 MG tablet  - montelukast (SINGULAIR) 10 MG tablet    Other request: Patient is requesting refills on the medications above for 30-day supplies. He is currently out of all three. States the Simvastatin was previously prescribed but he was never able to get it as it was prescribed for 90-days and insurance will not cover that length. Please assist. Thanks!    Can we leave a detailed message on this number? YES    Phone number patient can be reached at: Home number on file [phone] (home)    Best Time: Any    Call taken on 5/1/2018 at 11:03 AM by Vickie Lorenzana

## 2018-05-02 ENCOUNTER — TELEPHONE (OUTPATIENT)
Dept: FAMILY MEDICINE | Facility: CLINIC | Age: 83
End: 2018-05-02

## 2018-05-02 NOTE — TELEPHONE ENCOUNTER
Prior Authorization Not Needed per Insurance    Medication: metoprolol Tartrate 50mg tablet  Insurance Company: [employer] - Phone [phone] Fax [phone]  Expected CoPay:      Pharmacy Filling the Rx: [facility] [address] [place], [place] [address] AVE AT [address]  Pharmacy Notified: Yes  Patient Notified: Yes    Called pharmacy and they state [employer] is coming up as secondary insurance. They think he didn't renew is Part D coverage(?).  They are in contact with the patient and was given the pharmacy help desk phone #[phone].  covermymeds does state he is covered under [employer]. Pharmacy was given my direct phone number if they have any questions.

## 2018-05-02 NOTE — TELEPHONE ENCOUNTER
Prior Authorization Retail Medication Request    Medication/Dose: metoprolol Tartrate 50mg tablet  ICD code (if different than what is on RX):  Previously Tried and Failed:  Rationale:    Insurance Name: unknown  Insurance ID: [id number]    Pharmacy Information (if different than what is on RX)  Name: [facility]  Phone:  [phone]    Please include previous medications tried and failed.  Please ask insurance for medications on formulary.

## 2018-06-12 DIAGNOSIS — I10 HYPERTENSION GOAL BP (BLOOD PRESSURE) < 140/90: ICD-10-CM

## 2018-06-12 NOTE — TELEPHONE ENCOUNTER
"Requested Prescriptions   Pending Prescriptions Disp Refills     lisinopril (PRINIVIL/ZESTRIL) 20 MG tablet [Pharmacy Med Name: LISINOPRIL 20MG TABLETS]  Last Written Prescription Date:  12/12/2017  Last Fill Quantity: 90 TABLET,  # refills: 1   Last Office Visit: 12/12/2017   Future Office Visit:    Next 5 appointments (look out 90 days)     Jun 13, 2018  8:20 AM CDT   SHORT with Dany Rodriguez MD, ASL IS   [facility] ([facility])    [address] [place] [address] [phone]                90 tablet 0     Sig: TAKE 1 TABLET(20 MG) BY MOUTH DAILY    ACE Inhibitors (Including Combos) Protocol Passed    6/12/2018 10:56 AM       Passed - Blood pressure under 140/90 in past 12 months    BP Readings from Last 3 Encounters:   12/12/17 136/84   06/13/17 132/72   02/06/17 138/86          Passed - Recent (12 mo) or future (30 days) visit within the authorizing provider's specialty    Patient had office visit in the last 12 months or has a visit in the next 30 days with authorizing provider or within the authorizing provider's specialty.  See \"Patient Info\" tab in inbasket, or \"Choose Columns\" in Meds & Orders section of the refill encounter.           Passed - Patient is age 18 or older       Passed - Normal serum creatinine on file in past 12 months    Recent Labs   Lab Test  12/12/17   0818   CR  1.25          Passed - Normal serum potassium on file in past 12 months    Recent Labs   Lab Test  12/12/17 0818   POTASSIUM  4.9               "

## 2018-06-13 ENCOUNTER — OFFICE VISIT (OUTPATIENT)
Dept: FAMILY MEDICINE | Facility: CLINIC | Age: 83
End: 2018-06-13
Payer: MEDICARE

## 2018-06-13 VITALS
SYSTOLIC BLOOD PRESSURE: 165 MMHG | OXYGEN SATURATION: 98 % | WEIGHT: 167.75 LBS | DIASTOLIC BLOOD PRESSURE: 74 MMHG | RESPIRATION RATE: 16 BRPM | BODY MASS INDEX: 26.33 KG/M2 | HEIGHT: 67 IN | TEMPERATURE: 97.5 F | HEART RATE: 52 BPM

## 2018-06-13 DIAGNOSIS — M25.571 PAIN IN JOINT, ANKLE AND FOOT, RIGHT: ICD-10-CM

## 2018-06-13 DIAGNOSIS — E78.5 HYPERLIPIDEMIA WITH TARGET LDL LESS THAN 100: ICD-10-CM

## 2018-06-13 DIAGNOSIS — I10 HYPERTENSION GOAL BP (BLOOD PRESSURE) < 140/90: ICD-10-CM

## 2018-06-13 DIAGNOSIS — J45.40 MODERATE PERSISTENT ASTHMA WITHOUT COMPLICATION: Primary | ICD-10-CM

## 2018-06-13 DIAGNOSIS — N18.30 CKD (CHRONIC KIDNEY DISEASE) STAGE 3, GFR 30-59 ML/MIN (H): ICD-10-CM

## 2018-06-13 LAB
ERYTHROCYTE [DISTWIDTH] IN BLOOD BY AUTOMATED COUNT: 18.8 % (ref 10–15)
HCT VFR BLD AUTO: 37.9 % (ref 40–53)
HGB BLD-MCNC: 12.2 G/DL (ref 13.3–17.7)
MCH RBC QN AUTO: 20.8 PG (ref 26.5–33)
MCHC RBC AUTO-ENTMCNC: 32.2 G/DL (ref 31.5–36.5)
MCV RBC AUTO: 65 FL (ref 78–100)
PLATELET # BLD AUTO: 135 10E9/L (ref 150–450)
RBC # BLD AUTO: 5.87 10E12/L (ref 4.4–5.9)
WBC # BLD AUTO: 4.4 10E9/L (ref 4–11)

## 2018-06-13 PROCEDURE — 80061 LIPID PANEL: CPT | Performed by: FAMILY MEDICINE

## 2018-06-13 PROCEDURE — 99214 OFFICE O/P EST MOD 30 MIN: CPT | Performed by: FAMILY MEDICINE

## 2018-06-13 PROCEDURE — 36415 COLL VENOUS BLD VENIPUNCTURE: CPT | Performed by: FAMILY MEDICINE

## 2018-06-13 PROCEDURE — 85027 COMPLETE CBC AUTOMATED: CPT | Performed by: FAMILY MEDICINE

## 2018-06-13 PROCEDURE — 82043 UR ALBUMIN QUANTITATIVE: CPT | Performed by: FAMILY MEDICINE

## 2018-06-13 RX ORDER — METOPROLOL TARTRATE 50 MG
TABLET ORAL
Qty: 30 TABLET | Refills: 11 | Status: SHIPPED | OUTPATIENT
Start: 2018-06-13 | End: 2019-06-12

## 2018-06-13 RX ORDER — SIMVASTATIN 40 MG
40 TABLET ORAL AT BEDTIME
Qty: 30 TABLET | Refills: 11 | Status: SHIPPED | OUTPATIENT
Start: 2018-06-13 | End: 2019-06-12

## 2018-06-13 RX ORDER — LISINOPRIL 20 MG/1
TABLET ORAL
Qty: 30 TABLET | Refills: 11 | Status: SHIPPED | OUTPATIENT
Start: 2018-06-13 | End: 2019-05-31

## 2018-06-13 NOTE — LETTER
June 18, 2018      Shiraz PALOMARES Juan Jose  [address] [facility] [address]        Dear ,    We are writing to inform you of your test results.    Your cholesterol is okay.  Your urine test for leaking protein is normal.  Your hemoglobin is slightly on the lower side.  You have a history of iron deficiency and it may be the primary culprit.  We will continue to monitor    Resulted Orders   CBC with platelets   Result Value Ref Range    WBC 4.4 4.0 - 11.0 10e9/L    RBC Count 5.87 4.4 - 5.9 10e12/L      Comment:      MODERATE ELIPTOCYTES SEEN ON SLIDE REVIEW    Hemoglobin 12.2 (L) 13.3 - 17.7 g/dL    Hematocrit 37.9 (L) 40.0 - 53.0 %    MCV 65 (L) 78 - 100 fl    MCH 20.8 (L) 26.5 - 33.0 pg    MCHC 32.2 31.5 - 36.5 g/dL    RDW 18.8 (H) 10.0 - 15.0 %    Platelet Count 135 (L) 150 - 450 10e9/L      Comment:      NORMAL MORPHOLOGY   Lipid panel reflex to direct LDL Fasting   Result Value Ref Range    Cholesterol 120 <200 mg/dL    Triglycerides 86 <150 mg/dL      Comment:      Fasting specimen    HDL Cholesterol 43 >39 mg/dL    LDL Cholesterol Calculated 60 <100 mg/dL      Comment:      Desirable:       <100 mg/dl    Non HDL Cholesterol 77 <130 mg/dL   Albumin Random Urine Quantitative with Creat Ratio   Result Value Ref Range    Creatinine Urine 129 mg/dL    Albumin Urine mg/L 17 mg/L    Albumin Urine mg/g Cr 13.49 0 - 17 mg/g Cr       If you have any questions or concerns, please call the clinic at the number listed above.       Sincerely,        Dany Rodriguez MD/nr

## 2018-06-13 NOTE — PROGRESS NOTES
SUBJECTIVE:   Shiraz Ingram is a 83 year old male who presents to clinic today for the following health issues:        Hyperlipidemia Follow-Up      Rate your low fat/cholesterol diet?: not monitoring fat    Taking statin?  Yes, no muscle aches from statin but has pain in right foot     Other lipid medications/supplements?:  none    Hypertension Follow-up      Outpatient blood pressures are not being checked.    Low Salt Diet: no added salt    BP Readings from Last 2 Encounters:   12/12/17 136/84   06/13/17 132/72     LDL Cholesterol Calculated (mg/dL)   Date Value   06/13/2017 63   06/13/2016 58       Amount of exercise or physical activity: 6-7 days/week for an average of 15-30 minutes    Problems taking medications regularly: No    Medication side effects: none    Diet: regular (no restrictions)    Here with sign .    seeing a pulmonologist.    pulmicort - daily rather than bid and duoneb TID from QID.    Not needing any pain meds. Right ankle pain.     Problem list and histories reviewed & adjusted, as indicated.  Additional history: as documented    Labs reviewed in EPIC    Reviewed and updated as needed this visit by clinical staff    Reviewed and updated as needed this visit by Provider      Social History     Social History     Marital status:      Spouse name: Kailye     Number of children: 3     Years of education: N/A     Occupational History     upholstery Retired     Social History Main Topics     Smoking status: Never Smoker     Smokeless tobacco: Never Used     Alcohol use No     Drug use: No     Sexual activity: Yes     Partners: Female     Other Topics Concern      Service No     Blood Transfusions No     Exercise Yes     Seat Belt Yes     Self-Exams No     Parent/Sibling W/ Cabg, Mi Or Angioplasty Before 65f 55m? Yes     Social History Narrative    Balanced Diet - Yes    Osteoporosis Preventative measures-  Dairy servings per day: 2 to 3 servings daily    Regular Exercise  "-  Yes Describe walks 1.5 mile daily     Dental Exam up - YES - Date: 2 years ago    Eye Exam - YES - Date: 1 year ago    Self Testicular Exam -  No, handout given    Do you have any concerns about STD's -  No    Abuse: Current or Past (Physical, Sexual or Emotional)- No    Do you feel safe in your environment - Yes    Guns stored in the home - Yes, locked    Sunscreen used - No    Seatbelts used - Yes    Lipids - YES - Date: 3-09    Glucose -  YES - Date: 3-09    Colon Cancer Screening - Colonoscopy 3-08(date completed)    Hemoccults - UNKNOWN    PSA - YES - Date: 11-07    Digital Rectal Exam - UNKNOWN    Immunizations reviewed and up to date - Yes, td 1-2005, had shingles vaccine    5-28-09  JANEY Patel CMA                     Allergies   Allergen Reactions     No Known Drug Allergies      Patient Active Problem List   Diagnosis     Coronary atherosclerosis     Benign neoplasm of colon     Localized osteoarthrosis, lower leg     Allergic rhinitis     Moderate persistent asthma     Anemia     Osteoporosis     Hypertension goal BP (blood pressure) < 140/90     Hyperlipidemia with target LDL less than 100     CKD (chronic kidney disease) stage 3, GFR 30-59 ml/min     Aortic stenosis, mild     Advanced directives, counseling/discussion     Iron deficiency anemia     Vitamin B 12 deficiency     Health Care Home     Mild persistent asthma without complication     Reviewed medications, social history and  past medical and surgical history.    Review of system: for general, respiratory, CVS, GI and psychiatry negative except for noted above.     EXAM:  /74 (BP Location: Right arm, Patient Position: Sitting, Cuff Size: Adult Regular)  Pulse 52  Temp 97.5  F (36.4  C) (Oral)  Resp 16  Ht 5' 7\" (1.702 m)  Wt 167 lb 12 oz (76.1 kg)  SpO2 98%  BMI 26.27 kg/m2  Constitutional: healthy, alert and no distress   Psychiatric: mentation appears normal and affect normal/bright  Cardiovascular: RRR. No " murmurs,  Respiratory: negative, Lungs clear. No crackles or wheezing. No tachypnea.   Right ankle just above heel on calcaneofibular ligament area tenderness.     ASSESSMENT / PLAN:  (J45.40) Moderate persistent asthma without complication  (primary encounter diagnosis)  Comment:     Plan: seeing pulmonologist and doing well.     (I10) Hypertension goal BP (blood pressure) < 140/90  Comment:  bp above goal. We discussed going up on the dose of lisinopril vs recheck in a month. He would like to recheck in a month.   Plan: Lipid panel reflex to direct LDL Fasting,         Albumin Random Urine Quantitative with Creat         Ratio, lisinopril (PRINIVIL/ZESTRIL) 20 MG         tablet             (N18.3) CKD (chronic kidney disease) stage 3, GFR 30-59 ml/min  Comment: monitor specially with lisinpril   Plan: CBC with platelets, Albumin Random Urine         Quantitative with Creat Ratio       (E78.5) Hyperlipidemia with target LDL less than 100  Comment: Patient is tolerating current medication without any major side effects of concerns and current dose seems reasonable too.  Current medication regime is effective. Continue current treatment without any changes.   Plan: simvastatin (ZOCOR) 40 MG tablet             (M25.571) Pain in joint, ankle and foot, right  Comment:  Does not want intervention at this point.   Plan: call for podiatry referral if needed. Does not like tennis shoes.       Patient Instructions   Consider shingrix vaccine. Its a new shingles vaccine.     Com back in a month for bp recheck.

## 2018-06-13 NOTE — MR AVS SNAPSHOT
After Visit Summary   6/13/2018    Shiraz Ingram    MRN: [id number]           Patient Information     Date Of Birth          11/30/1934        Visit Information        Provider Department      6/13/2018 8:20 AM Dany Rodriguez MD; ASL IS [facility]        Today's Diagnoses     Moderate persistent asthma without complication    -  1    Hypertension goal BP (blood pressure) < 140/90        CKD (chronic kidney disease) stage 3, GFR 30-59 ml/min        Hyperlipidemia with target LDL less than 100        Pain in joint, ankle and foot, right          Care Instructions    Consider shingrix vaccine. Its a new shingles vaccine.     Com back in a month for bp recheck.           Follow-ups after your visit        Your next 10 appointments already scheduled     Jul 12, 2018  8:00 AM CDT   Nurse Only with HW MEDICAL ASSISTANT   [facility] ([facility])    [address] [place] [id number] [phone]              Who to contact     If you have questions or need follow up information about today's clinic visit or your schedule please contact [facility] directly at [phone].  Normal or non-critical lab and imaging results will be communicated to you by MyChart, letter or phone within 4 business days after the clinic has received the results. If you do not hear from us within 7 days, please contact the clinic through MyChart or phone. If you have a critical or abnormal lab result, we will notify you by phone as soon as possible.  Submit refill requests through [employer] or call your pharmacy and they will forward the refill request to us. Please allow 3 business days for your refill to be completed.          Additional Information About Your Visit        Care EveryWhere ID     This is your Care EveryWhere ID. This could be used by other organizations to access your [facility] medical records  [id number]        Your Vitals Were      "Pulse Temperature Respirations Height Pulse Oximetry BMI (Body Mass Index)    52 97.5  F (36.4  C) (Oral) 16 5' 7\" (1.702 m) 98% 26.27 kg/m2       Blood Pressure from Last 3 Encounters:   06/13/18 165/74   12/12/17 136/84   06/13/17 132/72    Weight from Last 3 Encounters:   06/13/18 167 lb 12 oz (76.1 kg)   12/12/17 167 lb 8 oz (76 kg)   06/13/17 165 lb 12 oz (75.2 kg)              We Performed the Following     Albumin Random Urine Quantitative with Creat Ratio     CBC with platelets     Lipid panel reflex to direct LDL Fasting          Today's Medication Changes          These changes are accurate as of 6/13/18 12:49 PM.  If you have any questions, ask your nurse or doctor.               These medicines have changed or have updated prescriptions.        Dose/Directions    ferrous sulfate 325 (65 Fe) MG tablet   Commonly known as:  IRON   This may have changed:  Another medication with the same name was removed. Continue taking this medication, and follow the directions you see here.   Used for:  Anemia, unspecified type   Changed by:  Dany Rodriguez MD        TAKE 1 TABLET(325 MG) BY MOUTH TWICE DAILY   Quantity:  180 tablet   Refills:  0            Where to get your medicines      These medications were sent to [facility] [address] [place] [address] AT [facility] & [address] [place] [id number]    Hours:  24-hours Phone:  [phone]     lisinopril 20 MG tablet    metoprolol tartrate 50 MG tablet    simvastatin 40 MG tablet                Primary Care Provider Office Phone # Fax #    Dany Rodriguez [phone] [address] [place] [address]        Equal Access to Services     ROBB ABBASI : Koko Escobedo, kimmie ghosh, bonnie kaalanitra escalante. So [facility] [phone].    ATENCIÓN: Si habla español, tiene a alexander disposición servicios gratuitos " de asistencia lingüística. Yaniv madden [phone].    We comply with applicable federal civil rights laws and [place] laws. We do not discriminate on the basis of race, color, national origin, age, disability, sex, sexual orientation, or gender identity.            Thank you!     Thank you for choosing [facility]  for your care. Our goal is always to provide you with excellent care. Hearing back from our patients is one way we can continue to improve our services. Please take a few minutes to complete the written survey that you may receive in the mail after your visit with us. Thank you!             Your Updated Medication List - Protect others around you: Learn how to safely use, store and throw away your medicines at www.disposemymeds.org.          This list is accurate as of 6/13/18 12:49 PM.  Always use your most recent med list.                   Brand Name Dispense Instructions for use Diagnosis    albuterol 108 (90 Base) MCG/ACT Inhaler    PROAIR HFA    1 Inhaler    INHALE 1 TO 2 PUFFS EVERY 4 TO 6 HOURS AS NEEDED    Moderate persistent asthma, uncomplicated       ASPIRIN PO      Take 325 mg by mouth daily        budesonide 0.5 MG/2ML neb solution    PULMICORT    120 mL    Take 2 mLs (0.5 mg) by nebulization 2 times daily    Moderate persistent asthma       CALTRATE 600 + D 600-200 MG-IU Tabs     30    TAKE 1 TABLET BY MOUTH DAILY    Osteoporosis, unspecified       ferrous sulfate 325 (65 Fe) MG tablet    IRON    180 tablet    TAKE 1 TABLET(325 MG) BY MOUTH TWICE DAILY    Anemia, unspecified type       ipratropium - albuterol 0.5 mg/2.5 mg/3 mL 0.5-2.5 (3) MG/3ML neb solution    DUONEB    360 mL    Inhale 1 vial (3 mLs) into the lungs 4 times daily    Moderate persistent asthma, uncomplicated       lisinopril 20 MG tablet    PRINIVIL/ZESTRIL    30 tablet    TAKE 1 TABLET(20 MG) BY MOUTH DAILY    Hypertension goal BP (blood pressure) < 140/90       metoprolol tartrate 50 MG tablet    LOPRESSOR     30 tablet    TAKE 1/2 TABLET(25 MG) BY MOUTH TWICE DAILY    Hypertension goal BP (blood pressure) < 140/90       montelukast 10 MG tablet    SINGULAIR    30 tablet    Take 1 tablet (10 mg) by mouth daily    Moderate persistent asthma       MULTIVITAMIN PO      Take 1 tablet by mouth daily        simvastatin 40 MG tablet    ZOCOR    30 tablet    Take 1 tablet (40 mg) by mouth At Bedtime    Hyperlipidemia with target LDL less than 100

## 2018-06-14 LAB
CHOLEST SERPL-MCNC: 120 MG/DL
HDLC SERPL-MCNC: 43 MG/DL
LDLC SERPL CALC-MCNC: 60 MG/DL
NONHDLC SERPL-MCNC: 77 MG/DL
TRIGL SERPL-MCNC: 86 MG/DL

## 2018-06-14 RX ORDER — LISINOPRIL 20 MG/1
TABLET ORAL
Qty: 90 TABLET | Refills: 0 | OUTPATIENT
Start: 2018-06-14

## 2018-06-14 ASSESSMENT — ASTHMA QUESTIONNAIRES: ACT_TOTALSCORE: 23

## 2018-06-15 LAB
CREAT UR-MCNC: 129 MG/DL
MICROALBUMIN UR-MCNC: 17 MG/L
MICROALBUMIN/CREAT UR: 13.49 MG/G CR (ref 0–17)

## 2018-07-03 DIAGNOSIS — J45.40 MODERATE PERSISTENT ASTHMA: ICD-10-CM

## 2018-07-05 NOTE — TELEPHONE ENCOUNTER
"Requested Prescriptions   Pending Prescriptions Disp Refills     montelukast (SINGULAIR) 10 MG tablet [Pharmacy Med Name: MONTELUKAST 10MG TABLETS]  Last Written Prescription Date:  05/01/2018  Last Fill Quantity: 30 tablet,  # refills: 1   Last Office Visit: 6/13/2018   Future Office Visit:    Next 5 appointments (look out 90 days)     Jul 12, 2018  8:00 AM CDT   Nurse Only with HW MEDICAL ASSISTANT   [facility]    [address] [place] [address] [phone]                  90 tablet 1     Sig: TAKE 1 TABLET(10 MG) BY MOUTH DAILY    Leukotriene Inhibitors Protocol Passed    7/3/2018  6:54 PM       Passed - Patient is age 12 or older    If patient is under 16, ok to refill using age based dosing.          Passed - Asthma control assessment score within normal limits in last 6 months    Please review ACT score.          Passed - Recent (6 mo) or future (30 days) visit within the authorizing provider's specialty    Patient had office visit in the last 6 months or has a visit in the next 30 days with authorizing provider or within the authorizing provider's specialty.  See \"Patient Info\" tab in inbasket, or \"Choose Columns\" in Meds & Orders section of the refill encounter.              "

## 2018-07-10 RX ORDER — MONTELUKAST SODIUM 10 MG/1
TABLET ORAL
Qty: 90 TABLET | Refills: 1 | Status: SHIPPED | OUTPATIENT
Start: 2018-07-10 | End: 2020-12-30

## 2018-07-10 NOTE — TELEPHONE ENCOUNTER
Prescription approved per [facility] [place] Refill Protocol.    Arminda Lopez RN   [facility]

## 2018-07-12 ENCOUNTER — ALLIED HEALTH/NURSE VISIT (OUTPATIENT)
Dept: NURSING | Facility: CLINIC | Age: 83
End: 2018-07-12
Payer: MEDICARE

## 2018-07-12 VITALS — HEART RATE: 52 BPM | DIASTOLIC BLOOD PRESSURE: 85 MMHG | SYSTOLIC BLOOD PRESSURE: 134 MMHG

## 2018-07-12 DIAGNOSIS — I10 HYPERTENSION GOAL BP (BLOOD PRESSURE) < 140/90: Primary | ICD-10-CM

## 2018-07-12 PROCEDURE — 99207 ZZC NO CHARGE NURSE ONLY: CPT

## 2018-07-12 NOTE — Clinical Note
Shiraz Ingram is a 83 year old patient who comes in today for a Blood Pressure check. Initial BP:  /85 (BP Location: Left arm, Patient Position: Chair, Cuff Size: Adult Regular)  Pulse 52    52 Disposition: follow-up as previously indicated by provider.  Ashley Fortune MA

## 2018-07-12 NOTE — MR AVS SNAPSHOT
After Visit Summary   7/12/2018    Shiraz Ingram    MRN: [id number]           Patient Information     Date Of Birth          11/30/1934        Visit Information        Provider Department      7/12/2018 8:00 AM ASL IS; HW MEDICAL ASSISTANT [facility]        Today's Diagnoses     Hypertension goal BP (blood pressure) < 140/90    -  1       Follow-ups after your visit        Follow-up notes from your care team     Return for BP Recheck.      Your next 10 appointments already scheduled     Dec 12, 2018  7:40 AM CST   Office Visit with Dany Rodriguez MD   [facility] ([facility])    [address] [place] [phone] [phone]           Bring a current list of meds and any records pertaining to this visit. For Physicals, please bring immunization records and any forms needing to be filled out. Please arrive 10 minutes early to complete paperwork.              Who to contact     If you have questions or need follow up information about today's clinic visit or your schedule please contact [facility] directly at [phone].  Normal or non-critical lab and imaging results will be communicated to you by MyChart, letter or phone within 4 business days after the clinic has received the results. If you do not hear from us within 7 days, please contact the clinic through MyChart or phone. If you have a critical or abnormal lab result, we will notify you by phone as soon as possible.  Submit refill requests through [employer] or call your pharmacy and they will forward the refill request to us. Please allow 3 business days for your refill to be completed.          Additional Information About Your Visit        Care EveryWhere ID     This is your Care EveryWhere ID. This could be used by other organizations to access your [facility] medical records  [id number]        Your Vitals Were     Pulse                   52            Blood  Pressure from Last 3 Encounters:   07/12/18 134/85   06/13/18 165/74   12/12/17 136/84    Weight from Last 3 Encounters:   06/13/18 167 lb 12 oz (76.1 kg)   12/12/17 167 lb 8 oz (76 kg)   06/13/17 165 lb 12 oz (75.2 kg)              Today, you had the following     No orders found for display       Primary Care Provider Office Phone # Fax #    Dany Evgeny Rodriguez [phone] [address] [place] [id number]        Equal Access to Services     [facility]: Hadii miguel a Escobedo, waaxda augie, qajhonyta sneha bateman, anitra owens . So [facility] [phone].    ATENCIÓN: Si habla español, tiene a alexander disposición servicios gratuitos de asistencia lingüística. [facility] [phone].    We comply with applicable federal civil rights laws and [place] laws. We do not discriminate on the basis of race, color, national origin, age, disability, sex, sexual orientation, or gender identity.            Thank you!     Thank you for choosing [facility]  for your care. Our goal is always to provide you with excellent care. Hearing back from our patients is one way we can continue to improve our services. Please take a few minutes to complete the written survey that you may receive in the mail after your visit with us. Thank you!             Your Updated Medication List - Protect others around you: Learn how to safely use, store and throw away your medicines at www.disposemymeds.org.          This list is accurate as of 7/12/18  8:30 AM.  Always use your most recent med list.                   Brand Name Dispense Instructions for use Diagnosis    albuterol 108 (90 Base) MCG/ACT Inhaler    PROAIR HFA    1 Inhaler    INHALE 1 TO 2 PUFFS EVERY 4 TO 6 HOURS AS NEEDED    Moderate persistent asthma, uncomplicated       ASPIRIN PO      Take 325 mg by mouth daily        budesonide 0.5 MG/2ML neb solution    PULMICORT    120 mL    Take 2 mLs (0.5 mg) by  nebulization 2 times daily    Moderate persistent asthma       CALTRATE 600 + D 600-200 MG-IU Tabs     30    TAKE 1 TABLET BY MOUTH DAILY    Osteoporosis, unspecified       ferrous sulfate 325 (65 Fe) MG tablet    IRON    180 tablet    TAKE 1 TABLET(325 MG) BY MOUTH TWICE DAILY    Anemia, unspecified type       ipratropium - albuterol 0.5 mg/2.5 mg/3 mL 0.5-2.5 (3) MG/3ML neb solution    DUONEB    360 mL    Inhale 1 vial (3 mLs) into the lungs 4 times daily    Moderate persistent asthma, uncomplicated       lisinopril 20 MG tablet    PRINIVIL/ZESTRIL    30 tablet    TAKE 1 TABLET(20 MG) BY MOUTH DAILY    Hypertension goal BP (blood pressure) < 140/90       metoprolol tartrate 50 MG tablet    LOPRESSOR    30 tablet    TAKE 1/2 TABLET(25 MG) BY MOUTH TWICE DAILY    Hypertension goal BP (blood pressure) < 140/90       montelukast 10 MG tablet    SINGULAIR    90 tablet    TAKE 1 TABLET(10 MG) BY MOUTH DAILY    Moderate persistent asthma       MULTIVITAMIN PO      Take 1 tablet by mouth daily        simvastatin 40 MG tablet    ZOCOR    30 tablet    Take 1 tablet (40 mg) by mouth At Bedtime    Hyperlipidemia with target LDL less than 100

## 2018-07-30 DIAGNOSIS — J45.40 MODERATE PERSISTENT ASTHMA: ICD-10-CM

## 2018-07-31 NOTE — TELEPHONE ENCOUNTER
"Requested Prescriptions   Pending Prescriptions Disp Refills     montelukast (SINGULAIR) 10 MG tablet [Pharmacy Med Name: MONTELUKAST 10MG TABLETS]  Last Written Prescription Date:  7/10/2018  Last Fill Quantity: 90 tablet,  # refills: 1   Last Office Visit: 6/13/2018   Future Office Visit:      30 tablet 0     Sig: TAKE 1 TABLET(10 MG) BY MOUTH DAILY    Leukotriene Inhibitors Protocol Passed    7/30/2018  6:34 PM       Passed - Patient is age 12 or older    If patient is under 16, ok to refill using age based dosing.          Passed - Asthma control assessment score within normal limits in last 6 months    Please review ACT score.   ACT Total Scores 6/13/2017 12/12/2017 6/13/2018   ACT TOTAL SCORE - - -   ASTHMA ER VISITS - - -   ASTHMA HOSPITALIZATIONS - - -   ACT TOTAL SCORE (Goal Greater than or Equal to 20) 20 17 23   In the past 12 months, how many times did you visit the emergency room for your asthma without being admitted to the hospital? 0 0 0   In the past 12 months, how many times were you hospitalized overnight because of your asthma? 0 0 0            Passed - Recent (6 mo) or future (30 days) visit within the authorizing provider's specialty    Patient had office visit in the last 6 months or has a visit in the next 30 days with authorizing provider or within the authorizing provider's specialty.  See \"Patient Info\" tab in inbasket, or \"Choose Columns\" in Meds & Orders section of the refill encounter.              "

## 2018-08-03 RX ORDER — MONTELUKAST SODIUM 10 MG/1
TABLET ORAL
Qty: 30 TABLET | Refills: 0 | Status: SHIPPED | OUTPATIENT
Start: 2018-08-03 | End: 2018-09-08

## 2018-09-05 DIAGNOSIS — J45.40 MODERATE PERSISTENT ASTHMA: ICD-10-CM

## 2018-09-08 RX ORDER — MONTELUKAST SODIUM 10 MG/1
TABLET ORAL
Qty: 30 TABLET | Refills: 0 | OUTPATIENT
Start: 2018-09-08

## 2018-12-12 ENCOUNTER — OFFICE VISIT (OUTPATIENT)
Dept: FAMILY MEDICINE | Facility: CLINIC | Age: 83
End: 2018-12-12
Payer: MEDICARE

## 2018-12-12 VITALS
SYSTOLIC BLOOD PRESSURE: 136 MMHG | RESPIRATION RATE: 16 BRPM | TEMPERATURE: 97.6 F | OXYGEN SATURATION: 97 % | BODY MASS INDEX: 26.92 KG/M2 | DIASTOLIC BLOOD PRESSURE: 83 MMHG | HEART RATE: 64 BPM | HEIGHT: 67 IN | WEIGHT: 171.5 LBS

## 2018-12-12 DIAGNOSIS — Z23 NEED FOR PROPHYLACTIC VACCINATION AND INOCULATION AGAINST INFLUENZA: ICD-10-CM

## 2018-12-12 DIAGNOSIS — J45.40 MODERATE PERSISTENT ASTHMA, UNCOMPLICATED: ICD-10-CM

## 2018-12-12 DIAGNOSIS — I10 HYPERTENSION GOAL BP (BLOOD PRESSURE) < 140/90: Primary | ICD-10-CM

## 2018-12-12 DIAGNOSIS — E78.5 HYPERLIPIDEMIA WITH TARGET LDL LESS THAN 100: ICD-10-CM

## 2018-12-12 LAB
ALBUMIN SERPL-MCNC: 4 G/DL (ref 3.4–5)
ALP SERPL-CCNC: 59 U/L (ref 40–150)
ALT SERPL W P-5'-P-CCNC: 31 U/L (ref 0–70)
ANION GAP SERPL CALCULATED.3IONS-SCNC: 8 MMOL/L (ref 3–14)
AST SERPL W P-5'-P-CCNC: 22 U/L (ref 0–45)
BILIRUB SERPL-MCNC: 0.8 MG/DL (ref 0.2–1.3)
BUN SERPL-MCNC: 31 MG/DL (ref 7–30)
CALCIUM SERPL-MCNC: 9.6 MG/DL (ref 8.5–10.1)
CHLORIDE SERPL-SCNC: 107 MMOL/L (ref 94–109)
CHOLEST SERPL-MCNC: 129 MG/DL
CO2 SERPL-SCNC: 26 MMOL/L (ref 20–32)
CREAT SERPL-MCNC: 1.4 MG/DL (ref 0.66–1.25)
GFR SERPL CREATININE-BSD FRML MDRD: 48 ML/MIN/1.7M2
GLUCOSE SERPL-MCNC: 88 MG/DL (ref 70–99)
HDLC SERPL-MCNC: 52 MG/DL
LDLC SERPL CALC-MCNC: 63 MG/DL
NONHDLC SERPL-MCNC: 77 MG/DL
POTASSIUM SERPL-SCNC: 4.9 MMOL/L (ref 3.4–5.3)
PROT SERPL-MCNC: 7.7 G/DL (ref 6.8–8.8)
SODIUM SERPL-SCNC: 141 MMOL/L (ref 133–144)
TRIGL SERPL-MCNC: 71 MG/DL

## 2018-12-12 PROCEDURE — 80053 COMPREHEN METABOLIC PANEL: CPT | Performed by: FAMILY MEDICINE

## 2018-12-12 PROCEDURE — 80061 LIPID PANEL: CPT | Performed by: FAMILY MEDICINE

## 2018-12-12 PROCEDURE — 90471 IMMUNIZATION ADMIN: CPT | Performed by: FAMILY MEDICINE

## 2018-12-12 PROCEDURE — 36415 COLL VENOUS BLD VENIPUNCTURE: CPT | Performed by: FAMILY MEDICINE

## 2018-12-12 PROCEDURE — 90662 IIV NO PRSV INCREASED AG IM: CPT | Performed by: FAMILY MEDICINE

## 2018-12-12 PROCEDURE — T1013 SIGN LANG/ORAL INTERPRETER: HCPCS | Mod: U3 | Performed by: FAMILY MEDICINE

## 2018-12-12 PROCEDURE — 99214 OFFICE O/P EST MOD 30 MIN: CPT | Mod: 25 | Performed by: FAMILY MEDICINE

## 2018-12-12 RX ORDER — ALBUTEROL SULFATE 90 UG/1
AEROSOL, METERED RESPIRATORY (INHALATION)
Qty: 1 INHALER | Refills: 11 | Status: CANCELLED | OUTPATIENT
Start: 2018-12-12

## 2018-12-12 RX ORDER — LISINOPRIL 20 MG/1
TABLET ORAL
Qty: 30 TABLET | Refills: 11 | Status: CANCELLED | OUTPATIENT
Start: 2018-12-12

## 2018-12-12 ASSESSMENT — MIFFLIN-ST. JEOR: SCORE: 1426.55

## 2018-12-12 NOTE — LETTER
My Asthma Action Plan  Name: Shiraz Ingram   YOB: 1934  Date: 12/12/2018   My doctor: Dany Rodriguez MD, MD   My clinic: [facility]        My Control Medicine: Budesonide (Pulmicort) nebulizer solution -  0.5mg/2ml    My Rescue Medicine: Albuterol/ipratroprium  (Duoneb) nebulizer solution     My Asthma Severity: mild persistent  Avoid your asthma triggers: see below  smoke            GREEN ZONE   Good Control    I feel good    No cough or wheeze    Can work, sleep and play without asthma symptoms       Take your asthma control medicine every day.     1. If exercise triggers your asthma, take your rescue medication    15 minutes before exercise or sports, and    During exercise if you have asthma symptoms  2. Spacer to use with inhaler: If you have a spacer, make sure to use it with your inhaler             YELLOW ZONE Getting Worse  I have ANY of these:    I do not feel good    Cough or wheeze    Chest feels tight    Wake up at night   1. Keep taking your Green Zone medications  2. Start taking your rescue medicine:    every 20 minutes for up to 1 hour. Then every 4 hours for 24-48 hours.  3. If you stay in the Yellow Zone for more than 12-24 hours, contact your doctor.  4. If you do not return to the Green Zone in 12-24 hours or you get worse, start taking your oral steroid medicine if prescribed by your provider.           RED ZONE Medical Alert - Get Help  I have ANY of these:    I feel awful    Medicine is not helping    Breathing getting harder    Trouble walking or talking    Nose opens wide to breathe       1. Take your rescue medicine NOW  2. If your provider has prescribed an oral steroid medicine, start taking it NOW  3. Call your doctor NOW  4. If you are still in the Red Zone after 20 minutes and you have not reached your doctor:    Take your rescue medicine again and    Call 911 or go to the emergency room right away    See your regular doctor within 2 weeks of  an Emergency Room or Urgent Care visit for follow-up treatment.          Annual Reminders:  Meet with Asthma Educator,  Flu Shot in the Fall, consider Pneumonia Vaccination for patients with asthma (aged 19 and older).    Pharmacy:    [facility] [address] [place], [place] - [address] AT [address] [facility] [place], [place] - [address]                      Asthma Triggers  How To Control Things That Make Your Asthma Worse    Triggers are things that make your asthma worse.  Look at the list below to help you find your triggers and what you can do about them.  You can help prevent asthma flare-ups by staying away from your triggers.      Trigger                                                          What you can do   Cigarette Smoke  Tobacco smoke can make asthma worse. Do not allow smoking in your home, car or around you.  Be sure no one smokes at a child s day care or school.  If you smoke, ask your health care provider for ways to help you quit.  Ask family members to quit too.  Ask your health care provider for a referral to Quit Plan to help you quit smoking, or call 5-741-915-PLAN.     Colds, Flu, Bronchitis  These are common triggers of asthma. Wash your hands often.  Don t touch your eyes, nose or mouth.  Get a flu shot every year.     Dust Mites  These are tiny bugs that live in cloth or carpet. They are too small to see. Wash sheets and blankets in hot water every week.   Encase pillows and mattress in dust mite proof covers.  Avoid having carpet if you can. If you have carpet, vacuum weekly.   Use a dust mask and HEPA vacuum.   Pollen and Outdoor Mold  Some people are allergic to trees, grass, or weed pollen, or molds. Try to keep your windows closed.  Limit time out doors when pollen count is high.   Ask you health care provider about taking medicine during allergy season.     Animal Dander  Some people are allergic to skin flakes, urine or  saliva from pets with fur or feathers. Keep pets with fur or feathers out of your home.    If you can t keep the pet outdoors, then keep the pet out of your bedroom.  Keep the bedroom door closed.  Keep pets off cloth furniture and away from stuffed toys.     Mice, Rats, and Cockroaches  Some people are allergic to the waste from these pests.   Cover food and garbage.  Clean up spills and food crumbs.  Store grease in the refrigerator.   Keep food out of the bedroom.   Indoor Mold  This can be a trigger if your home has high moisture. Fix leaking faucets, pipes, or other sources of water.   Clean moldy surfaces.  Dehumidify basement if it is damp and smelly.   Smoke, Strong Odors, and Sprays  These can reduce air quality. Stay away from strong odors and sprays, such as perfume, powder, hair spray, paints, smoke incense, paint, cleaning products, candles and new carpet.   Exercise or Sports  Some people with asthma have this trigger. Be active!  Ask your doctor about taking medicine before sports or exercise to prevent symptoms.    Warm up for 5-10 minutes before and after sports or exercise.     Other Triggers of Asthma  Cold air:  Cover your nose and mouth with a scarf.  Sometimes laughing or crying can be a trigger.  Some medicines and food can trigger asthma.

## 2018-12-12 NOTE — PROGRESS NOTES
SUBJECTIVE:   Shiraz Ingram is a 84 year old male who presents to clinic today for the following health issues:    Hyperlipidemia Follow-Up      Rate your low fat/cholesterol diet?: not monitoring fat    Taking statin?  Yes, no muscle aches from statin    Other lipid medications/supplements?:  none    Hypertension Follow-up      Outpatient blood pressures are being checked once in a while     Low Salt Diet: no added salt    Chronic Kidney Disease Follow-up      Current NSAID use?  No      Amount of exercise or physical activity: 6-7 days/week for an average of 15-30 minutes    Problems taking medications regularly: No    Medication side effects: none    Diet: regular (no restrictions)    Here with sign .   Takes his meds daily.     Last time bp was high and repeat in a month was better.     Has enough refills of his rx.     Uses inhaler 4 times per day and Pulmicort twice per day. Feeling really well. Does not need refill. Sees pulmonologist 4 times per year.     Still working. Feeling OK.     Problem list and histories reviewed & adjusted, as indicated.  Additional history: as documented    Labs reviewed in EPIC    Reviewed and updated as needed this visit by clinical staff       Reviewed and updated as needed this visit by Provider         Social History     Socioeconomic History     Marital status:      Spouse name: Kailey     Number of children: 3     Years of education: Not on file     Highest education level: Not on file   Social Needs     Financial resource strain: Not on file     Food insecurity - worry: Not on file     Food insecurity - inability: Not on file     Transportation needs - medical: Not on file     Transportation needs - non-medical: Not on file   Occupational History     Occupation: [employer]     Employer: RETIRED   Tobacco Use     Smoking status: Never Smoker     Smokeless tobacco: Never Used   Substance and Sexual Activity     Alcohol use: No     Alcohol/week: 0.0 oz      Drug use: No     Sexual activity: Yes     Partners: Female   Other Topics Concern      Service No     Blood Transfusions No     Caffeine Concern Not Asked     Occupational Exposure Not Asked     Hobby Hazards Not Asked     Sleep Concern Not Asked     Stress Concern Not Asked     Weight Concern Not Asked     Special Diet Not Asked     Back Care Not Asked     Exercise Yes     Bike Helmet Not Asked     Seat Belt Yes     Self-Exams No     Parent/sibling w/ CABG, MI or angioplasty before 65F 55M? Yes   Social History Narrative    Balanced Diet - Yes    Osteoporosis Preventative measures-  Dairy servings per day: 2 to 3 servings daily    Regular Exercise -  Yes Describe walks 1.5 mile daily     Dental Exam up - YES - Date: 2 years ago    Eye Exam - YES - Date: 1 year ago    Self Testicular Exam -  No, handout given    Do you have any concerns about STD's -  No    Abuse: Current or Past (Physical, Sexual or Emotional)- No    Do you feel safe in your environment - Yes    Guns stored in the home - Yes, locked    Sunscreen used - No    Seatbelts used - Yes    Lipids - YES - Date: 3-09    Glucose -  YES - Date: 3-09    Colon Cancer Screening - Colonoscopy 3-08(date completed)    Hemoccults - UNKNOWN    PSA - YES - Date: 11-07    Digital Rectal Exam - UNKNOWN    Immunizations reviewed and up to date - Yes, td 1-2005, had shingles vaccine    5-28-09  JANEY Patel CMA                     Allergies   Allergen Reactions     No Known Drug Allergies      Patient Active Problem List   Diagnosis     Coronary atherosclerosis     Benign neoplasm of colon     Localized osteoarthrosis, lower leg     Allergic rhinitis     Moderate persistent asthma     Anemia     Osteoporosis     Hypertension goal BP (blood pressure) < 140/90     Hyperlipidemia with target LDL less than 100     CKD (chronic kidney disease) stage 3, GFR 30-59 ml/min (H)     Aortic stenosis, mild     Advanced directives, counseling/discussion     Iron deficiency anemia  "    Vitamin B 12 deficiency     Health Care Home     Mild persistent asthma without complication     Reviewed medications, social history and  past medical and surgical history.    Review of system: for general, respiratory, CVS, GI and psychiatry negative except for noted above.     EXAM:  /83 (BP Location: Left arm, Patient Position: Sitting, Cuff Size: Adult Regular)   Pulse 64   Temp 97.6  F (36.4  C) (Oral)   Resp 16   Ht 1.702 m (5' 7\")   Wt 77.8 kg (171 lb 8 oz)   SpO2 97%   BMI 26.86 kg/m    Constitutional: healthy, alert and no distress   Psychiatric: mentation appears normal and affect normal/bright  Cardiovascular: RRR. Systolic murmur  Respiratory: negative, Lungs clear. No crackles or wheezing. No tachypnea.        ASSESSMENT / PLAN:  (I10) Hypertension goal BP (blood pressure) < 140/90  (primary encounter diagnosis)  Comment: Patient is tolerating current medication without any major side effects of concerns and current dose seems reasonable too.  Current medication regime is effective. Continue current treatment without any changes.   Plan: Comprehensive metabolic panel             (J45.40) Moderate persistent asthma, uncomplicated  Comment:    Plan:  Seeing pulmonologist. Continue rx as per them.     (E78.5) Hyperlipidemia with target LDL less than 100  Comment:  Patient is tolerating current medication without any major side effects of concerns and current dose seems reasonable too.  Current medication regime is effective. Continue current treatment without any changes.   Plan: Lipid panel reflex to direct LDL Fasting,         Comprehensive metabolic panel             (Z23) Need for prophylactic vaccination and inoculation against influenza  Comment:    Plan: FLU VACCINE, INCREASED ANTIGEN, PRESV FREE, AGE        65+ [40799], ADMIN INFLUENZA  (For MEDICARE         Patients ONLY) []           Follow up: 6 months.     The above note was dictated using voice recognition. Although " reviewed after completion, some word and grammatical error may remain .      Patient Instructions   Check your insurance coverage for SHINGRIX vaccine. It is a new shingles vaccine. It is very effective in cutting down chances of shingles. It is  2 shot series that is administered atleast 2 months apart. If it is covered, call our clinic and schedule nurse only appointment to get the vaccine. Some insurance covers it if it is given in the pharmacy. Check with your pharmacy if that is the case.

## 2018-12-13 ASSESSMENT — ASTHMA QUESTIONNAIRES: ACT_TOTALSCORE: 22

## 2019-04-08 DIAGNOSIS — I10 ESSENTIAL HYPERTENSION WITH GOAL BLOOD PRESSURE LESS THAN 140/90: ICD-10-CM

## 2019-04-09 RX ORDER — METOPROLOL TARTRATE 50 MG
TABLET ORAL
Qty: 1 TABLET | Refills: 0 | OUTPATIENT
Start: 2019-04-09

## 2019-04-09 NOTE — TELEPHONE ENCOUNTER
"Requested Prescriptions   Pending Prescriptions Disp Refills     metoprolol tartrate (LOPRESSOR) 50 MG tablet [Pharmacy Med Name: METOPROLOL TARTRATE 50MG TABLETS] 30 tablet 0     Sig: TAKE 1/2 TABLET BY MOUTH TWICE DAILY.       Beta-Blockers Protocol Passed - 4/8/2019  1:41 PM        Passed - Blood pressure under 140/90 in past 12 months     BP Readings from Last 3 Encounters:   12/12/18 136/83   07/12/18 134/85   06/13/18 165/74                 Passed - Patient is age 6 or older        Passed - Recent (12 mo) or future (30 days) visit within the authorizing provider's specialty     Patient had office visit in the last 12 months or has a visit in the next 30 days with authorizing provider or within the authorizing provider's specialty.  See \"Patient Info\" tab in inbasket, or \"Choose Columns\" in Meds & Orders section of the refill encounter.              Passed - Medication is active on med list        Refused Prescriptions:                       Disp   Refills    metoprolol tartrate (LOPRESSOR) 50 MG tabl*1 tabl*0        Sig: TAKE 1/2 TABLET BY MOUTH TWICE DAILY.  Refused By: SATHYA WARREN  Reason for Refusal: Patient has requested refill too soon      Closing encounter - no further actions needed at this time    Sathay Warren RN    "

## 2019-05-31 DIAGNOSIS — I10 HYPERTENSION GOAL BP (BLOOD PRESSURE) < 140/90: ICD-10-CM

## 2019-05-31 RX ORDER — LISINOPRIL 20 MG/1
TABLET ORAL
Qty: 90 TABLET | Refills: 1 | Status: SHIPPED | OUTPATIENT
Start: 2019-05-31 | End: 2019-06-12

## 2019-06-01 NOTE — TELEPHONE ENCOUNTER
"Requested Prescriptions   Pending Prescriptions Disp Refills     lisinopril (PRINIVIL/ZESTRIL) 20 MG tablet [Pharmacy Med Name: LISINOPRIL 20MG TABLETS] 30 tablet 0     Sig: TAKE 1 TABLET(20 MG) BY MOUTH DAILY       ACE Inhibitors (Including Combos) Protocol Failed - 5/31/2019  7:50 PM        Failed - Normal serum creatinine on file in past 12 months     Recent Labs   Lab Test 12/12/18  0840   CR 1.40*             Passed - Blood pressure under 140/90 in past 12 months     BP Readings from Last 3 Encounters:   12/12/18 136/83   07/12/18 134/85   06/13/18 165/74     Your kidney function is slightly abnormal but stable. Your liver function is fine.            Passed - Recent (12 mo) or future (30 days) visit within the authorizing provider's specialty     Patient had office visit in the last 12 months or has a visit in the next 30 days with authorizing provider or within the authorizing provider's specialty.  See \"Patient Info\" tab in inbasket, or \"Choose Columns\" in Meds & Orders section of the refill encounter.              Passed - Medication is active on med list        Passed - Patient is age 18 or older        Passed - Normal serum potassium on file in past 12 months     Recent Labs   Lab Test 12/12/18  0840   POTASSIUM 4.9             Prescription approved per [facility] Refill Protocol.    Signed Prescriptions:                        Disp   Refills    lisinopril (PRINIVIL/ZESTRIL) 20 MG tablet 90 tab*1        Sig: TAKE 1 TABLET(20 MG) BY MOUTH DAILY  Authorizing Provider: NATHAN ROME  Ordering User: SATHYA WARREN      "

## 2019-06-12 ENCOUNTER — OFFICE VISIT (OUTPATIENT)
Dept: FAMILY MEDICINE | Facility: CLINIC | Age: 84
End: 2019-06-12
Payer: COMMERCIAL

## 2019-06-12 VITALS
SYSTOLIC BLOOD PRESSURE: 152 MMHG | WEIGHT: 178.5 LBS | BODY MASS INDEX: 28.02 KG/M2 | HEIGHT: 67 IN | OXYGEN SATURATION: 97 % | DIASTOLIC BLOOD PRESSURE: 75 MMHG | TEMPERATURE: 97.9 F | HEART RATE: 58 BPM

## 2019-06-12 DIAGNOSIS — D64.9 ANEMIA, UNSPECIFIED TYPE: ICD-10-CM

## 2019-06-12 DIAGNOSIS — E78.5 HYPERLIPIDEMIA WITH TARGET LDL LESS THAN 100: ICD-10-CM

## 2019-06-12 DIAGNOSIS — B35.6 TINEA CRURIS: ICD-10-CM

## 2019-06-12 DIAGNOSIS — M79.89 LEG SWELLING: ICD-10-CM

## 2019-06-12 DIAGNOSIS — N18.30 CKD (CHRONIC KIDNEY DISEASE) STAGE 3, GFR 30-59 ML/MIN (H): ICD-10-CM

## 2019-06-12 DIAGNOSIS — I10 HYPERTENSION GOAL BP (BLOOD PRESSURE) < 140/90: Primary | ICD-10-CM

## 2019-06-12 DIAGNOSIS — J45.40 MODERATE PERSISTENT ASTHMA, UNCOMPLICATED: ICD-10-CM

## 2019-06-12 LAB
ALBUMIN SERPL-MCNC: 4.1 G/DL (ref 3.4–5)
ALP SERPL-CCNC: 69 U/L (ref 40–150)
ALT SERPL W P-5'-P-CCNC: 41 U/L (ref 0–70)
ANION GAP SERPL CALCULATED.3IONS-SCNC: 8 MMOL/L (ref 3–14)
AST SERPL W P-5'-P-CCNC: 29 U/L (ref 0–45)
BILIRUB SERPL-MCNC: 0.7 MG/DL (ref 0.2–1.3)
BUN SERPL-MCNC: 35 MG/DL (ref 7–30)
CALCIUM SERPL-MCNC: 9.7 MG/DL (ref 8.5–10.1)
CHLORIDE SERPL-SCNC: 107 MMOL/L (ref 94–109)
CHOLEST SERPL-MCNC: 136 MG/DL
CO2 SERPL-SCNC: 28 MMOL/L (ref 20–32)
CREAT SERPL-MCNC: 1.48 MG/DL (ref 0.66–1.25)
CREAT UR-MCNC: 90 MG/DL
ERYTHROCYTE [DISTWIDTH] IN BLOOD BY AUTOMATED COUNT: 16.1 % (ref 10–15)
FERRITIN SERPL-MCNC: 602 NG/ML (ref 26–388)
GFR SERPL CREATININE-BSD FRML MDRD: 43 ML/MIN/{1.73_M2}
GLUCOSE SERPL-MCNC: 96 MG/DL (ref 70–99)
HCT VFR BLD AUTO: 39.4 % (ref 40–53)
HDLC SERPL-MCNC: 46 MG/DL
HGB BLD-MCNC: 12.4 G/DL (ref 13.3–17.7)
IRON SATN MFR SERPL: 38 % (ref 15–46)
IRON SERPL-MCNC: 117 UG/DL (ref 35–180)
LDLC SERPL CALC-MCNC: 74 MG/DL
MCH RBC QN AUTO: 20.9 PG (ref 26.5–33)
MCHC RBC AUTO-ENTMCNC: 31.5 G/DL (ref 31.5–36.5)
MCV RBC AUTO: 66 FL (ref 78–100)
MICROALBUMIN UR-MCNC: 27 MG/L
MICROALBUMIN/CREAT UR: 29.63 MG/G CR (ref 0–17)
NONHDLC SERPL-MCNC: 90 MG/DL
PLATELET # BLD AUTO: 108 10E9/L (ref 150–450)
POTASSIUM SERPL-SCNC: 4.8 MMOL/L (ref 3.4–5.3)
PROT SERPL-MCNC: 7.7 G/DL (ref 6.8–8.8)
RBC # BLD AUTO: 5.94 10E12/L (ref 4.4–5.9)
SODIUM SERPL-SCNC: 143 MMOL/L (ref 133–144)
TIBC SERPL-MCNC: 312 UG/DL (ref 240–430)
TRIGL SERPL-MCNC: 82 MG/DL
WBC # BLD AUTO: 4.7 10E9/L (ref 4–11)

## 2019-06-12 PROCEDURE — T1013 SIGN LANG/ORAL INTERPRETER: HCPCS | Mod: U3 | Performed by: FAMILY MEDICINE

## 2019-06-12 PROCEDURE — 80053 COMPREHEN METABOLIC PANEL: CPT | Performed by: FAMILY MEDICINE

## 2019-06-12 PROCEDURE — 36415 COLL VENOUS BLD VENIPUNCTURE: CPT | Performed by: FAMILY MEDICINE

## 2019-06-12 PROCEDURE — 82728 ASSAY OF FERRITIN: CPT | Performed by: FAMILY MEDICINE

## 2019-06-12 PROCEDURE — 83550 IRON BINDING TEST: CPT | Performed by: FAMILY MEDICINE

## 2019-06-12 PROCEDURE — 83540 ASSAY OF IRON: CPT | Performed by: FAMILY MEDICINE

## 2019-06-12 PROCEDURE — 80061 LIPID PANEL: CPT | Performed by: FAMILY MEDICINE

## 2019-06-12 PROCEDURE — 85027 COMPLETE CBC AUTOMATED: CPT | Performed by: FAMILY MEDICINE

## 2019-06-12 PROCEDURE — 99214 OFFICE O/P EST MOD 30 MIN: CPT | Performed by: FAMILY MEDICINE

## 2019-06-12 PROCEDURE — 82043 UR ALBUMIN QUANTITATIVE: CPT | Performed by: FAMILY MEDICINE

## 2019-06-12 RX ORDER — LISINOPRIL 20 MG/1
20 TABLET ORAL DAILY
Qty: 90 TABLET | Refills: 1 | Status: SHIPPED | OUTPATIENT
Start: 2019-11-01 | End: 2019-11-29

## 2019-06-12 RX ORDER — KETOCONAZOLE 20 MG/G
CREAM TOPICAL 2 TIMES DAILY
Qty: 30 G | Refills: 1 | Status: SHIPPED | OUTPATIENT
Start: 2019-06-12 | End: 2020-01-22

## 2019-06-12 RX ORDER — SIMVASTATIN 40 MG
40 TABLET ORAL AT BEDTIME
Qty: 90 TABLET | Refills: 3 | Status: SHIPPED | OUTPATIENT
Start: 2019-06-12 | End: 2020-06-25

## 2019-06-12 RX ORDER — ALBUTEROL SULFATE 90 UG/1
AEROSOL, METERED RESPIRATORY (INHALATION)
Qty: 1 INHALER | Refills: 11 | Status: SHIPPED | OUTPATIENT
Start: 2019-06-12 | End: 2020-07-14

## 2019-06-12 RX ORDER — METOPROLOL TARTRATE 50 MG
TABLET ORAL
Qty: 90 TABLET | Refills: 3 | Status: ON HOLD | OUTPATIENT
Start: 2019-06-12 | End: 2020-05-14

## 2019-06-12 ASSESSMENT — ASTHMA QUESTIONNAIRES
QUESTION_2 LAST FOUR WEEKS HOW OFTEN HAVE YOU HAD SHORTNESS OF BREATH: ONCE A DAY
QUESTION_1 LAST FOUR WEEKS HOW MUCH OF THE TIME DID YOUR ASTHMA KEEP YOU FROM GETTING AS MUCH DONE AT WORK, SCHOOL OR AT HOME: MOST OF THE TIME
QUESTION_5 LAST FOUR WEEKS HOW WOULD YOU RATE YOUR ASTHMA CONTROL: WELL CONTROLLED
QUESTION_4 LAST FOUR WEEKS HOW OFTEN HAVE YOU USED YOUR RESCUE INHALER OR NEBULIZER MEDICATION (SUCH AS ALBUTEROL): ONCE A WEEK OR LESS
QUESTION_3 LAST FOUR WEEKS HOW OFTEN DID YOUR ASTHMA SYMPTOMS (WHEEZING, COUGHING, SHORTNESS OF BREATH, CHEST TIGHTNESS OR PAIN) WAKE YOU UP AT NIGHT OR EARLIER THAN USUAL IN THE MORNING: TWO OR THREE NIGHTS A WEEK
ACT_TOTALSCORE: 14

## 2019-06-12 ASSESSMENT — MIFFLIN-ST. JEOR: SCORE: 1458.3

## 2019-06-12 NOTE — LETTER
June 14, 2019      Shiraz Ingram  [address] [place] [address]        Dear ,    We are writing to inform you of your test results.    Your hemoglobin is better than the last time but still on lower side.  Your iron count is on higher side and I recommend you to cut back on iron to once a day.  Your urine test shows some leaking of protein which improves with blood pressure control.  Your cholesterol is within acceptable range.  Your kidney function is abnormal but it is stable.  Your liver function test is normal.     Resulted Orders   CBC with platelets   Result Value Ref Range    WBC 4.7 4.0 - 11.0 10e9/L    RBC Count 5.94 (H) 4.4 - 5.9 10e12/L    Hemoglobin 12.4 (L) 13.3 - 17.7 g/dL    Hematocrit 39.4 (L) 40.0 - 53.0 %    MCV 66 (L) 78 - 100 fl    MCH 20.9 (L) 26.5 - 33.0 pg    MCHC 31.5 31.5 - 36.5 g/dL    RDW 16.1 (H) 10.0 - 15.0 %    Platelet Count 108 (L) 150 - 450 10e9/L   Albumin Random Urine Quantitative with Creat Ratio   Result Value Ref Range    Creatinine Urine 90 mg/dL    Albumin Urine mg/L 27 mg/L    Albumin Urine mg/g Cr 29.63 (H) 0 - 17 mg/g Cr   Ferritin   Result Value Ref Range    Ferritin 602 (H) 26 - 388 ng/mL   Iron and iron binding capacity   Result Value Ref Range    Iron 117 35 - 180 ug/dL    Iron Binding Cap 312 240 - 430 ug/dL    Iron Saturation Index 38 15 - 46 %   Comprehensive metabolic panel   Result Value Ref Range    Sodium 143 133 - 144 mmol/L    Potassium 4.8 3.4 - 5.3 mmol/L    Chloride 107 94 - 109 mmol/L    Carbon Dioxide 28 20 - 32 mmol/L    Anion Gap 8 3 - 14 mmol/L    Glucose 96 70 - 99 mg/dL      Comment:      Fasting specimen    Urea Nitrogen 35 (H) 7 - 30 mg/dL    Creatinine 1.48 (H) 0.66 - 1.25 mg/dL    GFR Estimate 43 (L) >60 mL/min/[1.73_m2]      Comment:      Non  GFR Calc  Starting 12/18/2018, serum creatinine based estimated GFR (eGFR) will be   calculated using the Chronic Kidney Disease Epidemiology Collaboration   (CKD-EPI)  equation.      GFR Estimate If Black 49 (L) >60 mL/min/[1.73_m2]      Comment:       GFR Calc  Starting 12/18/2018, serum creatinine based estimated GFR (eGFR) will be   calculated using the Chronic Kidney Disease Epidemiology Collaboration   (CKD-EPI) equation.      Calcium 9.7 8.5 - 10.1 mg/dL    Bilirubin Total 0.7 0.2 - 1.3 mg/dL    Albumin 4.1 3.4 - 5.0 g/dL    Protein Total 7.7 6.8 - 8.8 g/dL    Alkaline Phosphatase 69 40 - 150 U/L    ALT 41 0 - 70 U/L    AST 29 0 - 45 U/L   Lipid panel reflex to direct LDL Fasting   Result Value Ref Range    Cholesterol 136 <200 mg/dL    Triglycerides 82 <150 mg/dL      Comment:      Fasting specimen    HDL Cholesterol 46 >39 mg/dL    LDL Cholesterol Calculated 74 <100 mg/dL      Comment:      Desirable:       <100 mg/dl    Non HDL Cholesterol 90 <130 mg/dL     If you have any questions or concerns, please call the clinic at the number listed above.   Sincerely,  Dany Rodriguez MD/nr

## 2019-06-12 NOTE — PROGRESS NOTES
Subjective     Shiraz Ingram is a 84 year old male who presents to clinic today for the following health issues:    HPI   Hypertension Follow-up      Do you check your blood pressure regularly outside of the clinic? No     Are you following a low salt diet? Yes    Are your blood pressures ever more than 140 on the top number (systolic) OR more   than 90 on the bottom number (diastolic), for example 140/90? No    Amount of exercise or physical activity: 6-7 days/week for an average of 15-30 minutes    Problems taking medications regularly: No    Medication side effects: none    Diet: low salt    Asthma - ups and down. Overall OK. No major allergy issue. Feels currently is OK but sometime short of breath with activity.     Used to walk around 12 blocks and now walking around 8 blocks. Walking daily though.     Had stress test around 3 yrs ago and it was normal.     History of anemia. Taking iron pill 1 pill per day.     Fasting today for lab work. Took metoprolol but no lisinopril yet.     bp does not check outside as he does not know how store machine works.     Some leg swelling. No pain or other symptoms. All day feel fine and evening time some swelling when watching tv and first thing in the morning - no swelling again.     Here with a sign     Social History     Occupational History     Occupation: [employer]     Employer: RETIRED   Tobacco Use     Smoking status: Never Smoker     Smokeless tobacco: Never Used   Substance and Sexual Activity     Alcohol use: No     Alcohol/week: 0.0 oz     Drug use: No     Sexual activity: Yes     Partners: Female     Allergies   Allergen Reactions     No Known Drug Allergies      Patient Active Problem List   Diagnosis     Coronary atherosclerosis     Benign neoplasm of colon     Localized osteoarthrosis, lower leg     Allergic rhinitis     Moderate persistent asthma     Anemia     Osteoporosis     Hypertension goal BP (blood pressure) < 140/90     Hyperlipidemia with  "target LDL less than 100     CKD (chronic kidney disease) stage 3, GFR 30-59 ml/min (H)     Aortic stenosis, mild     Advanced directives, counseling/discussion     Iron deficiency anemia     Vitamin B 12 deficiency     Health Care Home     Mild persistent asthma without complication     Reviewed medications, social history and  past medical and surgical history.    Review of system: for general, respiratory, CVS, GI and psychiatry negative except for noted above.     EXAM:  /75   Pulse 58   Temp 97.9  F (36.6  C) (Oral)   Ht 1.702 m (5' 7\")   Wt 81 kg (178 lb 8 oz)   SpO2 97%   BMI 27.96 kg/m    Constitutional: healthy, alert and no distress   Psychiatric: mentation appears normal and affect normal/bright  Cardiovascular: RRR. No murmurs,  Respiratory: negative, Lungs clear. No crackles or wheezing. No tachypnea.   Mild pitting edema both lower extremity mostly around the ankle.  Right groin -erythematous scaly lesion present      ASSESSMENT / PLAN:  (I10) Hypertension goal BP (blood pressure) < 140/90  (primary encounter diagnosis)  Comment: Above goal.  For his age we agreed not to adjust his blood pressure medication.  We discussed about monitoring his blood pressure more closely.  He agreed.   Plan: Albumin Random Urine Quantitative with Creat         Ratio, Comprehensive metabolic panel,         metoprolol tartrate (LOPRESSOR) 50 MG tablet,         lisinopril (PRINIVIL/ZESTRIL) 20 MG tablet             (J45.30) Mild persistent asthma without complication  Comment:   (E78.5) Hyperlipidemia with target LDL less than 100  Comment: Patient is tolerating current medication without any major side effects or concerns and current dose seems reasonable too.  Current medication regime is effective. Continue current treatment without any changes.  Plan: Comprehensive metabolic panel, Lipid panel         reflex to direct LDL Fasting, simvastatin         (ZOCOR) 40 MG tablet             (N18.3) CKD (chronic " kidney disease) stage 3, GFR 30-59 ml/min (H)  Comment: Avoid NSAIDs  Plan: Albumin Random Urine Quantitative with Creat         Ratio, Comprehensive metabolic panel             (D64.9) Anemia, unspecified type  Comment: History of iron deficiency.  Using iron supplement.  Recheck his labs today.  Plan: CBC with platelets, Ferritin, Iron and iron         binding capacity             (M79.89) Leg swelling  Comment:    Plan: Unclear etiology.  We discussed compression socks and elevation of both lower extremity when sitting for prolonged periods of time.  We will rule out renal cause and if is negative consider cardiac etiology.    (J45.40) Moderate persistent asthma, uncomplicated  Comment:  Currently not under control.  Beta-blockers and is most ideal for him but overall he feels fine and if he would like to continue with the same Rx for his asthma.  Uses albuterol as needed.  Plan: albuterol (PROAIR HFA) 108 (90 Base) MCG/ACT         inhaler             (B35.6) Tinea cruris  Comment: Discussed to keep area clean and dry.  Use cotton underwear.  Topical antifungal.  Plan: ketoconazole (NIZORAL) 2 % external cream               Return in about 6 months (around 12/12/2019).      The above note was dictated using voice recognition. Although reviewed after completion, some word and grammatical error may remain .

## 2019-06-13 ASSESSMENT — ASTHMA QUESTIONNAIRES: ACT_TOTALSCORE: 14

## 2019-06-22 ENCOUNTER — HOSPITAL ENCOUNTER (EMERGENCY)
Facility: CLINIC | Age: 84
Discharge: HOME | End: 2019-06-22
Attending: EMERGENCY MEDICINE | Admitting: EMERGENCY MEDICINE
Payer: COMMERCIAL

## 2019-06-22 ENCOUNTER — APPOINTMENT (OUTPATIENT)
Dept: GENERAL RADIOLOGY | Facility: CLINIC | Age: 84
End: 2019-06-22
Attending: EMERGENCY MEDICINE
Payer: COMMERCIAL

## 2019-06-22 VITALS
SYSTOLIC BLOOD PRESSURE: 171 MMHG | TEMPERATURE: 98.5 F | OXYGEN SATURATION: 97 % | DIASTOLIC BLOOD PRESSURE: 90 MMHG | RESPIRATION RATE: 18 BRPM | HEART RATE: 67 BPM

## 2019-06-22 DIAGNOSIS — J18.9 PNEUMONIA OF RIGHT LUNG DUE TO INFECTIOUS ORGANISM, UNSPECIFIED PART OF LUNG: ICD-10-CM

## 2019-06-22 LAB
ANION GAP SERPL CALCULATED.3IONS-SCNC: 3 MMOL/L (ref 3–14)
BASOPHILS # BLD AUTO: 0 10E9/L (ref 0–0.2)
BASOPHILS NFR BLD AUTO: 0.4 %
BUN SERPL-MCNC: 29 MG/DL (ref 7–30)
CALCIUM SERPL-MCNC: 9.2 MG/DL (ref 8.5–10.1)
CHLORIDE SERPL-SCNC: 107 MMOL/L (ref 94–109)
CO2 SERPL-SCNC: 29 MMOL/L (ref 20–32)
CREAT SERPL-MCNC: 1.24 MG/DL (ref 0.66–1.25)
DIFFERENTIAL METHOD BLD: ABNORMAL
EOSINOPHIL # BLD AUTO: 0.3 10E9/L (ref 0–0.7)
EOSINOPHIL NFR BLD AUTO: 4.8 %
ERYTHROCYTE [DISTWIDTH] IN BLOOD BY AUTOMATED COUNT: 16.4 % (ref 10–15)
GFR SERPL CREATININE-BSD FRML MDRD: 53 ML/MIN/{1.73_M2}
GLUCOSE SERPL-MCNC: 102 MG/DL (ref 70–99)
HCT VFR BLD AUTO: 35.6 % (ref 40–53)
HGB BLD-MCNC: 11.6 G/DL (ref 13.3–17.7)
IMM GRANULOCYTES # BLD: 0 10E9/L (ref 0–0.4)
IMM GRANULOCYTES NFR BLD: 0.2 %
LYMPHOCYTES # BLD AUTO: 0.8 10E9/L (ref 0.8–5.3)
LYMPHOCYTES NFR BLD AUTO: 14.5 %
MCH RBC QN AUTO: 21.1 PG (ref 26.5–33)
MCHC RBC AUTO-ENTMCNC: 32.6 G/DL (ref 31.5–36.5)
MCV RBC AUTO: 65 FL (ref 78–100)
MONOCYTES # BLD AUTO: 0.5 10E9/L (ref 0–1.3)
MONOCYTES NFR BLD AUTO: 10.2 %
NEUTROPHILS # BLD AUTO: 3.6 10E9/L (ref 1.6–8.3)
NEUTROPHILS NFR BLD AUTO: 69.9 %
NRBC # BLD AUTO: 0 10*3/UL
NRBC BLD AUTO-RTO: 0 /100
NT-PROBNP SERPL-MCNC: 4080 PG/ML (ref 0–1800)
PLATELET # BLD AUTO: 93 10E9/L (ref 150–450)
POTASSIUM SERPL-SCNC: 5.1 MMOL/L (ref 3.4–5.3)
RBC # BLD AUTO: 5.5 10E12/L (ref 4.4–5.9)
SODIUM SERPL-SCNC: 139 MMOL/L (ref 133–144)
WBC # BLD AUTO: 5.2 10E9/L (ref 4–11)

## 2019-06-22 PROCEDURE — 94640 AIRWAY INHALATION TREATMENT: CPT

## 2019-06-22 PROCEDURE — 83880 ASSAY OF NATRIURETIC PEPTIDE: CPT | Performed by: EMERGENCY MEDICINE

## 2019-06-22 PROCEDURE — 25000132 ZZH RX MED GY IP 250 OP 250 PS 637: Performed by: EMERGENCY MEDICINE

## 2019-06-22 PROCEDURE — 80048 BASIC METABOLIC PNL TOTAL CA: CPT | Performed by: EMERGENCY MEDICINE

## 2019-06-22 PROCEDURE — 25000125 ZZHC RX 250

## 2019-06-22 PROCEDURE — 85025 COMPLETE CBC W/AUTO DIFF WBC: CPT | Performed by: EMERGENCY MEDICINE

## 2019-06-22 PROCEDURE — 71046 X-RAY EXAM CHEST 2 VIEWS: CPT

## 2019-06-22 PROCEDURE — 99284 EMERGENCY DEPT VISIT MOD MDM: CPT | Mod: 25

## 2019-06-22 RX ORDER — DOXYCYCLINE 100 MG/1
100 CAPSULE ORAL 2 TIMES DAILY
Qty: 28 CAPSULE | Refills: 0 | Status: SHIPPED | OUTPATIENT
Start: 2019-06-22 | End: 2019-07-12

## 2019-06-22 RX ORDER — IPRATROPIUM BROMIDE AND ALBUTEROL SULFATE 2.5; .5 MG/3ML; MG/3ML
SOLUTION RESPIRATORY (INHALATION)
Status: COMPLETED
Start: 2019-06-22 | End: 2019-06-22

## 2019-06-22 RX ORDER — IPRATROPIUM BROMIDE AND ALBUTEROL SULFATE 2.5; .5 MG/3ML; MG/3ML
3 SOLUTION RESPIRATORY (INHALATION) ONCE
Status: COMPLETED | OUTPATIENT
Start: 2019-06-22 | End: 2019-06-22

## 2019-06-22 RX ORDER — DOXYCYCLINE 100 MG/1
100 CAPSULE ORAL ONCE
Status: COMPLETED | OUTPATIENT
Start: 2019-06-22 | End: 2019-06-22

## 2019-06-22 RX ADMIN — IPRATROPIUM BROMIDE AND ALBUTEROL SULFATE 3 ML: .5; 3 SOLUTION RESPIRATORY (INHALATION) at 03:17

## 2019-06-22 RX ADMIN — IPRATROPIUM BROMIDE AND ALBUTEROL SULFATE 3 ML: 2.5; .5 SOLUTION RESPIRATORY (INHALATION) at 03:17

## 2019-06-22 RX ADMIN — AMOXICILLIN AND CLAVULANATE POTASSIUM 1 TABLET: 875; 125 TABLET, FILM COATED ORAL at 05:46

## 2019-06-22 RX ADMIN — DOXYCYCLINE HYCLATE 100 MG: 100 CAPSULE ORAL at 05:46

## 2019-06-22 ASSESSMENT — ENCOUNTER SYMPTOMS
FEVER: 0
DIARRHEA: 0
SHORTNESS OF BREATH: 1
VOMITING: 0
COUGH: 1

## 2019-06-22 NOTE — DISCHARGE INSTRUCTIONS
Take antibiotics till gone  Follow up with primary care provider, will need repeat xray in about 3 weeks to make sure that pneumonia on xray has gone away  Continue your breathing treatments at home when feeling short of breath    Discharge Instructions  Bronchitis, Pneumonia, Bronchospasm    You were seen today for a chest infection or inflammation. If your provider decided this was due to a bacterial infection, you may need an antibiotic. Sometimes these are caused by a virus, and then an antibiotic will not help.     Generally, every Emergency Department visit should have a follow-up clinic visit with either a primary or a specialty clinic/provider. Please follow-up as instructed by your emergency provider today.    Return to the Emergency Department if:  Your breathing gets much worse.  You are very weak, or feel much more ill.  You develop new symptoms, such as chest pain.  You cough up blood.  You are vomiting (throwing up) enough that you cannot keep fluids or your medicine down.    What can I do to help myself?  Fill any prescriptions the provider gave you and take them right away--especially antibiotics. Be sure to finish the whole antibiotic prescription.  You may be given a prescription for an inhaler, which can help loosen tight air passages.  Use this as needed, but not more often than directed. Inhalers work much better when used with a spacer.   You may be given a prescription for a steroid to reduce inflammation. Used long-term, these can have side effects, but for short-term use they are safe. You may notice restlessness or increased appetite.      You may use non-prescription cough or cold medicines. Cough medicines may help, but don t make the cough go away completely.   Avoid smoke, because this can make your symptoms worse. If you smoke, this may be a good time to quit! Consider using nicotine lozenges, gum, or patches to reduce cravings.   If you have a fever, Tylenol  (acetaminophen), Motrin   (ibuprofen), or Advil  (ibuprofen) may help bring fever down and may help you feel more comfortable. Be sure to read and follow the package directions, and ask your provider if you have questions.  Be sure to get your flu shot each year.  For certain ages, the pneumonia shot can help prevent pneumonia.  If you were given a prescription for medicine here today, be sure to read all of the information (including the package insert) that comes with your prescription.  This will include important information about the medicine, its side effects, and any warnings that you need to know about.  The pharmacist who fills the prescription can provide more information and answer questions you may have about the medicine.  If you have questions or concerns that the pharmacist cannot address, please call or return to the Emergency Department.     Remember that you can always come back to the Emergency Department if you are not able to see your regular provider in the amount of time listed above, if you get any new symptoms, or if there is anything that worries you.

## 2019-06-22 NOTE — ED AVS SNAPSHOT
Emergency Department  [address] [place] [address]  Phone:  [phone]  Fax:  [phone]                                    Shiraz Ingram   MRN: [id number]    Department:   Emergency Department   Date of Visit:  6/22/2019           After Visit Summary Signature Page    I have received my discharge instructions, and my questions have been answered. I have discussed any challenges I see with this plan with the nurse or doctor.    ..........................................................................................................................................  Patient/Patient Representative Signature      ..........................................................................................................................................  Patient Representative Print Name and Relationship to Patient    ..................................................               ................................................  Date                                   Time    ..........................................................................................................................................  Reviewed by Signature/Title    ...................................................              ..............................................  Date                                               Time          22EPIC Rev 08/18

## 2019-06-22 NOTE — ED PROVIDER NOTES
History   Chief Complaint:  Shortness of breath    HPI   Shiraz Ingram is a 84 year old male, with a history of CABG x4, aortic stenosis, CKD III, hyperlipidemia, hypertension, and asthma, who presents with son to the ED for evaluation of shortness of breath. The patient reports having shortness of breath for the past three days with a productive cough of yellow mucous. The patient states he had used his breathing treatment thinking it was his asthma flaring up, however after the treatment he had not returned to the baseline normal after the treatment. He also notes having some increased leg swelling for the past couple weeks as well. He denies any fever, vomiting, chest pain, diarrhea, or any other acute symptoms.    Allergies:  No known drug allergies    Medications:    Albuterol  Pulmicort  Duoneb  Lisinopril  Lopressor  Singulair  Zocor    Past Medical History:    Anemia  Aortic stenosis  Benign neoplasm of colon  CKD III  Hyperlipidemia  Hypertension  Osteoarthrosis  Moderate persist ant asthma    Past Surgical History:    Coronary artery bypass  Herniorrhaphy inguina  Cholecystectomy     Family History:    CAD  Breast cancer  Cerebrovascular disease    Social History:  Smoking status: Never  Alcohol use: No  Marital Status:   [2]    Review of Systems   Constitutional: Negative for fever.   Respiratory: Positive for cough and shortness of breath.    Cardiovascular: Positive for leg swelling. Negative for chest pain.   Gastrointestinal: Negative for diarrhea and vomiting.     Physical Exam     Patient Vitals for the past 24 hrs:   BP Temp Temp src Pulse Resp SpO2   06/22/19 0303 169/85 -- -- 56 18 96 %   06/22/19 0159 199/77 98.5  F (36.9  C) Oral 61 20 95 %     Physical Exam  General: Resting comfortably on the gurney  Eyes:  The pupils are equal and round    Conjunctivae and sclerae are normal  ENT:    Moist mucous membranes  Neck:  Normal range of motion  CV:  Regular rate and rhythm    Skin warm and  well perfused   Resp:  Lungs are clear    Non-labored    No rales    No wheezing   GI:  Abdomen is soft, there is no rigidity    No distension    No rebound tenderness     No abdominal tenderness  MS:  Mild bilateral LE edema  Skin:  No rash or acute skin lesions noted  Neuro:   Awake, alert.      Speech is normal and fluent.    Face is symmetric.     Moves all extremities equally  Psych: Normal affect.  Appropriate interactions.    Emergency Department Course   Imaging:  Radiographic findings were communicated with the patient who voiced understanding of the findings.    XR Chest, 2 views:  1. New right suprahilar infiltrate consistent with pneumonia.  Follow-up to resolution recommended.  2. Left basilar atelectasis or scar.    Imaging independently reviewed and agree with radiologist interpretation.    Laboratory:  CBC: HGB 11.6 (L), 93 (L), o/w WNL (WBC 5.2)    BMP:  (H), GFR 53 (L), o/w WNL (Creatinine 1.24)    BNP: 4,080    Interventions:  0317: Duoneb 3 mL nebulization  0546: Augmentin 1 tablet PO  42924: Doxycycline 100 mg PO    Emergency Department Course:  Past medical records, nursing notes, and vitals reviewed.  0330: I performed an exam of the patient and obtained history, as documented above.  IV inserted and blood drawn.  The patient was sent for a chest x-ray while in the emergency department, findings above.    0435: I rechecked the patient. Explained findings to patient.    Findings and plan explained to the Patient. Patient discharged home with instructions regarding supportive care, medications, and reasons to return. The importance of close follow-up was reviewed.     Impression & Plan    Medical Decision Making:  Shiraz Ingram is a 84 year old male who presents for evaluation of shortness of breath.  History, physical exam and imaging studies are consistent with pneumonia.  First dose of antibiotics given in ED. There are no signs of complications of pneumonia at this point such as septic  shock, bacteremia, empyema, hypoxia, respiratory failure or compromise.  Supportive outpatient management is therefore indicated.  I will not therefore hospitalize for further cares or IV antibiotics.  This seems to be community acquired pneumonia. BNP is elevated but has been elevated in past and similar to past levels. Unlikely acute CHF exacerbation at this time. I doubt PE, dissection, acute coronary syndrome, or other worrisome etiology for patients symptoms. Recommended f/u with PCP for repeat exam and repeat follow-up xray to ensure resolution.      Diagnosis:    ICD-10-CM    1. Pneumonia of right lung due to infectious organism, unspecified part of lung J18.9 CBC with platelets differential     Disposition:  Discharged to home.    Discharge Medications:     Medication List      Started    amoxicillin-clavulanate 875-125 MG tablet  Commonly known as:  AUGMENTIN  1 tablet, Oral, 2 TIMES DAILY     doxycycline hyclate 100 MG capsule  Commonly known as:  VIBRAMYCIN  100 mg, Oral, 2 TIMES DAILY          Kaveh Gallagher  6/22/2019    EMERGENCY DEPARTMENT  Scribe Disclosure:  Kaveh RUTH, am serving as a scribe at 3:30 AM on 6/22/2019 to document services personally performed by Layla Sharma MD based on my observations and the provider's statements to me.         Layla Sharma MD  06/22/19 4062

## 2019-07-11 NOTE — PROGRESS NOTES
Subjective     Shiraz Ingram is a 84 year old male who presents to clinic today for the following health issues:    HPI   ED/UC Followup:    Facility:  [facility]  Date of visit: 6/22/19  Reason for visit: Pneumonia of right lung due to infectious organism, unspecified part of lung   Current Status: breathing is back to normal, no fevers, back to baseline     Here with sign .     Overall doing well. Feels back to baseline.     Around 2-3 yrs ago last episode.   Done with antibiotics.   No side effects. No diarrhea.     Inhalers - no need to go up on it.     Gets flu shot yearly. uptodate with pneumonia.            Social History     Occupational History     Occupation: [employer]     Employer: RETIRED   Tobacco Use     Smoking status: Never Smoker     Smokeless tobacco: Never Used   Substance and Sexual Activity     Alcohol use: No     Alcohol/week: 0.0 oz     Drug use: No     Sexual activity: Yes     Partners: Female     Allergies   Allergen Reactions     No Known Drug Allergies      Patient Active Problem List   Diagnosis     Coronary atherosclerosis     Benign neoplasm of colon     Localized osteoarthrosis, lower leg     Allergic rhinitis     Moderate persistent asthma     Anemia     Osteoporosis     Hypertension goal BP (blood pressure) < 140/90     Hyperlipidemia with target LDL less than 100     CKD (chronic kidney disease) stage 3, GFR 30-59 ml/min (H)     Aortic stenosis, mild     Advanced directives, counseling/discussion     Iron deficiency anemia     Vitamin B 12 deficiency     Health Care Home     Mild persistent asthma without complication     Reviewed medications, social history and  past medical and surgical history.    Review of system: for general, respiratory, CVS, GI and psychiatry negative except for noted above.     EXAM:  /74 (BP Location: Right arm, Patient Position: Chair, Cuff Size: Adult Regular)   Pulse (!) 47   Temp 97.5  F (36.4  C) (Oral)   Resp 12   Wt  80.1 kg (176 lb 8 oz)   SpO2 98%   BMI 27.64 kg/m    Constitutional: healthy, alert and no distress   Psychiatric: mentation appears normal and affect normal/bright  Cardiovascular: RRR.  Systolic murmur  Respiratory: negative, Lungs clear. No crackles or wheezing. No tachypnea.        ASSESSMENT / PLAN:  (J18.1) Pneumonia of right middle lobe due to infectious organism (H)  (primary encounter diagnosis)  Comment:    Plan: Reviewed ER notes.  Reviewed his x-ray.  Clinically feeling significantly better and currently asymptomatic.  We will wait another month before we repeat an x-ray which was recommended by radiologist for reevaluation.    (J45.40) Moderate persistent asthma without complication  Comment:    Plan: At baseline.  Up-to-date with Pneumovax and flu vaccine.      Return in about 4 weeks (around 8/9/2019).  For repeat x-ray      The above note was dictated using voice recognition. Although reviewed after completion, some word and grammatical error may remain .

## 2019-07-12 ENCOUNTER — OFFICE VISIT (OUTPATIENT)
Dept: FAMILY MEDICINE | Facility: CLINIC | Age: 84
End: 2019-07-12
Payer: COMMERCIAL

## 2019-07-12 VITALS
DIASTOLIC BLOOD PRESSURE: 74 MMHG | WEIGHT: 176.5 LBS | TEMPERATURE: 97.5 F | RESPIRATION RATE: 12 BRPM | SYSTOLIC BLOOD PRESSURE: 152 MMHG | HEART RATE: 47 BPM | BODY MASS INDEX: 27.64 KG/M2 | OXYGEN SATURATION: 98 %

## 2019-07-12 DIAGNOSIS — J45.40 MODERATE PERSISTENT ASTHMA WITHOUT COMPLICATION: ICD-10-CM

## 2019-07-12 DIAGNOSIS — J18.9 PNEUMONIA OF RIGHT MIDDLE LOBE DUE TO INFECTIOUS ORGANISM: Primary | ICD-10-CM

## 2019-07-12 PROCEDURE — 99214 OFFICE O/P EST MOD 30 MIN: CPT | Performed by: FAMILY MEDICINE

## 2019-08-12 ENCOUNTER — ANCILLARY PROCEDURE (OUTPATIENT)
Dept: GENERAL RADIOLOGY | Facility: CLINIC | Age: 84
End: 2019-08-12
Attending: FAMILY MEDICINE
Payer: COMMERCIAL

## 2019-08-12 ENCOUNTER — OFFICE VISIT (OUTPATIENT)
Dept: FAMILY MEDICINE | Facility: CLINIC | Age: 84
End: 2019-08-12
Payer: COMMERCIAL

## 2019-08-12 VITALS
RESPIRATION RATE: 16 BRPM | BODY MASS INDEX: 27.27 KG/M2 | DIASTOLIC BLOOD PRESSURE: 78 MMHG | HEIGHT: 67 IN | OXYGEN SATURATION: 98 % | WEIGHT: 173.75 LBS | SYSTOLIC BLOOD PRESSURE: 134 MMHG | HEART RATE: 70 BPM

## 2019-08-12 DIAGNOSIS — J18.9 PNEUMONIA OF RIGHT MIDDLE LOBE DUE TO INFECTIOUS ORGANISM: ICD-10-CM

## 2019-08-12 DIAGNOSIS — I10 HYPERTENSION GOAL BP (BLOOD PRESSURE) < 140/90: ICD-10-CM

## 2019-08-12 DIAGNOSIS — J18.9 PNEUMONIA OF RIGHT MIDDLE LOBE DUE TO INFECTIOUS ORGANISM: Primary | ICD-10-CM

## 2019-08-12 PROCEDURE — 99214 OFFICE O/P EST MOD 30 MIN: CPT | Performed by: FAMILY MEDICINE

## 2019-08-12 PROCEDURE — 71046 X-RAY EXAM CHEST 2 VIEWS: CPT

## 2019-08-12 ASSESSMENT — MIFFLIN-ST. JEOR: SCORE: 1436.75

## 2019-08-12 NOTE — PROGRESS NOTES
Subjective     Shiraz Ingram is a 84 year old male who presents to clinic today for the following health issues: 2 mos f/u check on R middle lobar pneumonia     HPI   Acute Illness   Acute illness concerns: pneumonia f/u  Onset:     Fever: no    Chills/Sweats: no    Headache (location?): no    Sinus Pressure:no    Conjunctivitis:  no    Ear Pain: no    Rhinorrhea: no    Congestion: no    Sore Throat: no     Cough: No    Wheeze: no    Decreased Appetite: no    Nausea: no    Vomiting: no    Diarrhea:  no    Dysuria/Freq.: no    Fatigue/Achiness: no    Sick/Strep Exposure: no     Therapies Tried and outcome:     Presented to ED on  6/22/2019 for increasing cough & SOB. CXR revealed R middle lobar pneumonia. Started Augmentin & doxycycline which ended on 7/12/2019. Visit on 7/12 was feeling much better: breathing back to normal, no fever, back to baseline.     Today he is here for repeat CXR.     Here with sign .  He denies any cough, short of breath, fever, needing inhaler more frequently.    He is taking his blood pressure every day outside and his blood pressure readings are mostly average systolic 125-135 and diastolic 65-85.       Social History     Occupational History     Occupation: [employer]     Employer: RETIRED   Tobacco Use     Smoking status: Never Smoker     Smokeless tobacco: Never Used   Substance and Sexual Activity     Alcohol use: No     Alcohol/week: 0.0 oz     Drug use: No     Sexual activity: Yes     Partners: Female     Allergies   Allergen Reactions     No Known Drug Allergies      Patient Active Problem List   Diagnosis     Coronary atherosclerosis     Benign neoplasm of colon     Localized osteoarthrosis, lower leg     Allergic rhinitis     Moderate persistent asthma     Anemia     Osteoporosis     Hypertension goal BP (blood pressure) < 140/90     Hyperlipidemia with target LDL less than 100     CKD (chronic kidney disease) stage 3, GFR 30-59 ml/min (H)     Aortic stenosis, mild      "Advanced directives, counseling/discussion     Iron deficiency anemia     Vitamin B 12 deficiency     Health Care Home     Mild persistent asthma without complication     Reviewed medications, social history and  past medical and surgical history.    Review of system: for general, respiratory, CVS, GI and psychiatry negative except for noted above.     EXAM:  /78 (BP Location: Left arm, Patient Position: Sitting, Cuff Size: Adult Regular)   Pulse 70   Resp 16   Ht 1.702 m (5' 7\")   Wt 78.8 kg (173 lb 12 oz)   SpO2 98%   BMI 27.21 kg/m    Constitutional: healthy, alert and no distress   Psychiatric: mentation appears normal and affect normal/bright  Cardiovascular: RRR.   systolic murmur.   Respiratory: negative, Lungs clear. No crackles or wheezing. No tachypnea.   Head: Normocephalic. No masses, lesions, tenderness or abnormalities   LYMPH: normal ant/post cervical, axillary, supraclavicular and inguinal nodes    ASSESSMENT / PLAN:  (J18.1) Pneumonia of right middle lobe due to infectious organism (H)  (primary encounter diagnosis)  Comment: appears resolved based on CXR & lack of symptoms. No wheezes heard appreciated on lung auscultation. No GI side effects from Abx use.   Plan: XR Chest 2 Views. Reviewed his CXR personally - xray looks significantly better and currently I do not see any infiltrate.  Await radiology input.  Clinically feeling significantly better & is asymptomatic.   No further intervention at this point.    Hypertension - R Arm BP was 144/66 and repeat L arm BP was 134/78. At home daily BP checks average systolic 125-135 and diastolic 65-85. At this time his bp seems well-managed with current anti-hypertensive. Recheck in 6 mos. Discussed w/ patient continuing to monitor at home.            Return in about 6 months (around 2/12/2019).      The above note was dictated using voice recognition. Although reviewed after completion, some word and grammatical error may remain .         "

## 2019-08-23 NOTE — TELEPHONE ENCOUNTER
This is active med on med list.  Not sure why it is not run on prot.  Routing to JEREMIE.  JOSE Fregoso     Bilateral Helical Rim Advancement Flap Text: The defect edges were debeveled with a #15 blade scalpel.  Given the location of the defect and the proximity to free margins (helical rim) a bilateral helical rim advancement flap was deemed most appropriate.  Using a sterile surgical marker, the appropriate advancement flaps were drawn incorporating the defect and placing the expected incisions between the helical rim and antihelix where possible.  The area thus outlined was incised through and through with a #15 scalpel blade.  With a skin hook and iris scissors, the flaps were gently and sharply undermined and freed up.

## 2019-11-15 ENCOUNTER — ALLIED HEALTH/NURSE VISIT (OUTPATIENT)
Dept: NURSING | Facility: CLINIC | Age: 84
End: 2019-11-15
Payer: COMMERCIAL

## 2019-11-15 DIAGNOSIS — Z23 NEED FOR PROPHYLACTIC VACCINATION AND INOCULATION AGAINST INFLUENZA: Primary | ICD-10-CM

## 2019-11-15 PROCEDURE — 90662 IIV NO PRSV INCREASED AG IM: CPT

## 2019-11-15 PROCEDURE — G0008 ADMIN INFLUENZA VIRUS VAC: HCPCS

## 2019-11-15 PROCEDURE — 99207 ZZC NO CHARGE NURSE ONLY: CPT

## 2019-11-16 ASSESSMENT — ASTHMA QUESTIONNAIRES: ACT_TOTALSCORE: 18

## 2019-11-26 DIAGNOSIS — I10 HYPERTENSION GOAL BP (BLOOD PRESSURE) < 140/90: ICD-10-CM

## 2019-11-26 RX ORDER — LISINOPRIL 20 MG/1
TABLET ORAL
Qty: 0.1 TABLET | Refills: 0 | OUTPATIENT
Start: 2019-11-26

## 2019-11-26 NOTE — TELEPHONE ENCOUNTER
"Requested Prescriptions   Pending Prescriptions Disp Refills     lisinopril (PRINIVIL/ZESTRIL) 20 MG tablet [Pharmacy Med Name: LISINOPRIL 20MG TABLETS] 90 tablet 0     Sig: TAKE 1 TABLET(20 MG) BY MOUTH DAILY  Last Written Prescription Date:  11/1/2019  Last Fill Quantity: 90 tablet,  # refills: 1   Last Office Visit: 8/12/2019   Future Office Visit:    Next 5 appointments (look out 90 days)    Dec 30, 2019  7:40 AM CST  Office Visit with Dany Rodriguez MD  [facility] ([facility]) [address] [place] [id number] [phone]              ACE Inhibitors (Including Combos) Protocol Passed - 11/26/2019  1:21 PM        Passed - Blood pressure under 140/90 in past 12 months     BP Readings from Last 3 Encounters:   08/12/19 134/78   07/12/19 152/74   06/22/19 171/90           Passed - Recent (12 mo) or future (30 days) visit within the authorizing provider's specialty     Patient has had an office visit with the authorizing provider or a provider within the authorizing providers department within the previous 12 mos or has a future within next 30 days. See \"Patient Info\" tab in inbasket, or \"Choose Columns\" in Meds & Orders section of the refill encounter.            Passed - Medication is active on med list        Passed - Patient is age 18 or older        Passed - Normal serum creatinine on file in past 12 months     Recent Labs   Lab Test 06/22/19  0346   CR 1.24           Passed - Normal serum potassium on file in past 12 months     Recent Labs   Lab Test 06/22/19  0346   POTASSIUM 5.1               "

## 2019-11-27 NOTE — TELEPHONE ENCOUNTER
Refused Prescriptions:                       Disp   Refills    lisinopril (PRINIVIL/ZESTRIL) 20 MG tablet*0.1 ta*0        Sig: TAKE 1 TABLET(20 MG) BY MOUTH DAILY  Refused By: SATHYA WARREN  Reason for Refusal: Duplicate

## 2019-11-29 RX ORDER — LISINOPRIL 20 MG/1
20 TABLET ORAL DAILY
Qty: 90 TABLET | Refills: 1 | Status: SHIPPED | OUTPATIENT
Start: 2019-11-29 | End: 2020-05-05

## 2019-11-29 NOTE — TELEPHONE ENCOUNTER
BP Readings from Last 3 Encounters:   08/12/19 134/78   07/12/19 152/74   06/22/19 171/90     Creatinine   Date Value Ref Range Status   06/22/2019 1.24 0.66 - 1.25 mg/dL Final     Potassium   Date Value Ref Range Status   06/22/2019 5.1 3.4 - 5.3 mmol/L Final     Resent this rx, pt was at  saying they did not have it at pharmacy .Tried to call pharmacy but too busy so just sent it again. Leidy should have gotten this 11/1/19 when last authorized    Roxie Gaona, RN, BSN

## 2019-12-30 ENCOUNTER — OFFICE VISIT (OUTPATIENT)
Dept: FAMILY MEDICINE | Facility: CLINIC | Age: 84
End: 2019-12-30
Payer: COMMERCIAL

## 2019-12-30 VITALS
WEIGHT: 169.5 LBS | HEIGHT: 67 IN | HEART RATE: 58 BPM | RESPIRATION RATE: 16 BRPM | SYSTOLIC BLOOD PRESSURE: 136 MMHG | OXYGEN SATURATION: 96 % | BODY MASS INDEX: 26.6 KG/M2 | DIASTOLIC BLOOD PRESSURE: 82 MMHG | TEMPERATURE: 97.8 F

## 2019-12-30 DIAGNOSIS — R01.1 SYSTOLIC MURMUR: Primary | ICD-10-CM

## 2019-12-30 DIAGNOSIS — I10 HYPERTENSION GOAL BP (BLOOD PRESSURE) < 140/90: ICD-10-CM

## 2019-12-30 PROCEDURE — T1013 SIGN LANG/ORAL INTERPRETER: HCPCS | Mod: U3

## 2019-12-30 PROCEDURE — 99214 OFFICE O/P EST MOD 30 MIN: CPT | Performed by: FAMILY MEDICINE

## 2019-12-30 ASSESSMENT — MIFFLIN-ST. JEOR: SCORE: 1412.48

## 2019-12-30 NOTE — PATIENT INSTRUCTIONS
Please call [phone] to schedule an appointment for ECHO (heart ultrasond).    Follow up in 6 months.

## 2019-12-30 NOTE — PROGRESS NOTES
Subjective     Shiraz Ingram is a 85 year old male who presents to clinic today for the following health issues:    HPI   Hyperlipidemia Follow-Up      Are you regularly taking any medication or supplement to lower your cholesterol?   Yes- simvastatin    Are you having muscle aches or other side effects that you think could be caused by your cholesterol lowering medication?  No    Hypertension Follow-up       Do you check your blood pressure regularly outside of the clinic? Yes     Are you following a low salt diet? Yes    Are your blood pressures ever more than 140 on the top number (systolic) OR more   than 90 on the bottom number (diastolic), for example 140/90? No      How many servings of fruits and vegetables do you eat daily?  4 or more    On average, how many sweetened beverages do you drink each day (Examples: soda, juice, sweet tea, etc.  Do NOT count diet or artificially sweetened beverages)?   3    How many days per week do you miss taking your medication? 0    Asthma Follow-Up    Was ACT completed today?    Yes    ACT Total Scores 12/30/2019   ACT TOTAL SCORE -   ASTHMA ER VISITS -   ASTHMA HOSPITALIZATIONS -   ACT TOTAL SCORE (Goal Greater than or Equal to 20) 14   In the past 12 months, how many times did you visit the emergency room for your asthma without being admitted to the hospital? 0   In the past 12 months, how many times were you hospitalized overnight because of your asthma? 0       How many days per week do you miss taking your asthma controller medication?  I do not have an asthma controller medication    Please describe any recent triggers for your asthma: cold air    Have you had any Emergency Room Visits, Urgent Care Visits, or Hospital Admissions since your last office visit?  No    Doing well.  Here with sign .   Doing well overall.   Day to day activities are fine.   No trouble with any of his meds including bp meds.     Reviewed and updated as needed this visit by  "Provider      Social History     Occupational History     Occupation: [employer]     Employer: RETIRED   Tobacco Use     Smoking status: Never Smoker     Smokeless tobacco: Never Used   Substance and Sexual Activity     Alcohol use: No     Alcohol/week: 0.0 standard drinks     Drug use: No     Sexual activity: Yes     Partners: Female     Allergies   Allergen Reactions     No Known Drug Allergies      Patient Active Problem List   Diagnosis     Coronary atherosclerosis     Benign neoplasm of colon     Localized osteoarthrosis, lower leg     Allergic rhinitis     Moderate persistent asthma     Anemia     Osteoporosis     Hypertension goal BP (blood pressure) < 140/90     Hyperlipidemia with target LDL less than 100     CKD (chronic kidney disease) stage 3, GFR 30-59 ml/min (H)     Aortic stenosis, mild     Advanced directives, counseling/discussion     Iron deficiency anemia     Vitamin B 12 deficiency     Health Care Home     Mild persistent asthma without complication     Reviewed medications, social history and  past medical and surgical history.    Review of system: for general, respiratory, CVS, GI and psychiatry negative except for noted above.     EXAM:  /82 (BP Location: Right arm, Patient Position: Sitting, Cuff Size: Adult Regular)   Pulse 58   Temp 97.8  F (36.6  C) (Oral)   Resp 16   Ht 1.702 m (5' 7\")   Wt 76.9 kg (169 lb 8 oz)   SpO2 96%   BMI 26.55 kg/m    Constitutional: healthy, alert and no distress   Psychiatric: mentation appears normal and affect normal/bright  Cardiovascular: Systolic murmur present  Respiratory: Mild expiratory wheezing, no crackles no tachypnea  Using sign language    ASSESSMENT / PLAN:  (R01.1) Systolic murmur  (primary encounter diagnosis)  Comment: Patient is asymptomatic.  Last echo in 2013.  Showed left atrial enlargement and showed mitral and aortic stenosis.  Obtain another echo.  Plan: Echocardiogram Complete             (I10) Hypertension goal BP (blood " pressure) < 140/90  Comment:    Plan: Continue to monitor.  Stick to the same dose of Rx.      Return in about 6 months (around 6/30/2020).      The above note was dictated using voice recognition. Although reviewed after completion, some word and grammatical error may remain .

## 2019-12-31 ASSESSMENT — ASTHMA QUESTIONNAIRES: ACT_TOTALSCORE: 14

## 2020-01-06 ENCOUNTER — ANCILLARY PROCEDURE (OUTPATIENT)
Dept: CARDIOLOGY | Facility: CLINIC | Age: 85
End: 2020-01-06
Attending: FAMILY MEDICINE
Payer: COMMERCIAL

## 2020-01-06 ENCOUNTER — TELEPHONE (OUTPATIENT)
Dept: FAMILY MEDICINE | Facility: CLINIC | Age: 85
End: 2020-01-06

## 2020-01-06 DIAGNOSIS — R01.1 SYSTOLIC MURMUR: ICD-10-CM

## 2020-01-06 DIAGNOSIS — I35.0 SEVERE AORTIC STENOSIS: Primary | ICD-10-CM

## 2020-01-06 NOTE — TELEPHONE ENCOUNTER
Able to talk to his son Nicko and provided him cardiology referral. They will help him to schedule with cardiology.

## 2020-01-06 NOTE — TELEPHONE ENCOUNTER
Called patient, unable to leave voicemail, mailbox full    Thank You!  Yessenia Chairez, RN  Triage Nurse

## 2020-01-06 NOTE — TELEPHONE ENCOUNTER
ECHO showed severe aortic stenosis.   Needs to see cardiology/cardiac surgery team.   Please call patient - referral done.     Patient has hearing impairment - he uses sign language but hears somewhat. It would be great if we can help him to just schedule an appointment to make it easy for him and order  for him for that appt.

## 2020-01-07 NOTE — TELEPHONE ENCOUNTER
Avoid very strenuous activity until seen by cardiology but day-to-day activities are just fine.  If having severe short of breath or chest pain he should go to ER.

## 2020-01-07 NOTE — TELEPHONE ENCOUNTER
Dr. Rodriguez-Please advise if any activity restrictions recommended based on Echo results?    Thank you!  KATHLEEN VelezN, RN

## 2020-01-07 NOTE — TELEPHONE ENCOUNTER
I talked to Forrest.  I gave him this message.  Pt sees cards on 1/22/2020.  I stressed not doing any activity that is strenuous.  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 (I did say that pt should get help when lifting chairs or doing any strenuous lifting.  Zenobia RN

## 2020-01-07 NOTE — TELEPHONE ENCOUNTER
Returned call to patient's son Forrest who states his brother Nicko gave him a piece of paper with cardiology referrals. He is not sure why/what is going on.    Relayed provider message below, Forrest verbalized understanding. He will pass along information to Nicko and they will get appointment scheduled for patient    Thank You!  Yessenia Chairez, RN  Triage Nurse

## 2020-01-07 NOTE — TELEPHONE ENCOUNTER
Patients son is wondering if he should restrict Shiraz from doing much / activity until he sees the cardiologist. Please advise. Thanks!

## 2020-01-07 NOTE — TELEPHONE ENCOUNTER
The patients son Forrest would like a return call to discuss referrals provided.  Consent to communicate on file.  Forrest can be reached at .

## 2020-01-22 ENCOUNTER — OFFICE VISIT (OUTPATIENT)
Dept: CARDIOLOGY | Facility: CLINIC | Age: 85
End: 2020-01-22
Attending: FAMILY MEDICINE
Payer: COMMERCIAL

## 2020-01-22 VITALS
HEIGHT: 66 IN | SYSTOLIC BLOOD PRESSURE: 146 MMHG | BODY MASS INDEX: 27.74 KG/M2 | WEIGHT: 172.6 LBS | HEART RATE: 64 BPM | DIASTOLIC BLOOD PRESSURE: 73 MMHG

## 2020-01-22 DIAGNOSIS — I35.0 NONRHEUMATIC AORTIC VALVE STENOSIS: Primary | ICD-10-CM

## 2020-01-22 DIAGNOSIS — R09.89 ABNORMAL CHEST SOUNDS: Primary | ICD-10-CM

## 2020-01-22 PROCEDURE — 99205 OFFICE O/P NEW HI 60 MIN: CPT | Performed by: INTERNAL MEDICINE

## 2020-01-22 PROCEDURE — T1013 SIGN LANG/ORAL INTERPRETER: HCPCS | Mod: U3

## 2020-01-22 PROCEDURE — 93000 ELECTROCARDIOGRAM COMPLETE: CPT | Performed by: INTERNAL MEDICINE

## 2020-01-22 ASSESSMENT — MIFFLIN-ST. JEOR: SCORE: 1406.69

## 2020-01-22 NOTE — LETTER
1/22/2020      Dany Rodriguez MD, MD  [address] [place] [id number]      RE: Shiraz Ingram       Dear Colleague,    I had the pleasure of seeing Shiraz Ingram in the [facility].    Service Date: 01/22/2020      HISTORY OF PRESENT ILLNESS:  It is a pleasure for me to see Mr. Ingram today for evaluation of aortic stenosis.      This gentleman has been followed up at [facility] for coronary artery disease.  He sustained an acute inferior wall infarction in 2002 with troponin peak of 38.  Angiography revealed multivessel coronary artery disease, though ejection fraction is relatively well preserved.  A balloon pump was placed and he underwent urgent bypass surgery with a LIMA graft placed to the LAD, with separate vein grafts to the OM and the RCA.  When he was last seen in 2012 by Dr. Beard, he was doing well.  The patient himself has known of murmur for the past 6 years.  In terms of symptoms, he tells me that he has always been a little short of breath on exertion, especially going up stairs.  This has not worsened recently.  He denies exertional dizzy spells, syncopal episodes.  He has not had any chest pains.  He has no PND, orthopnea or ankle swelling.  He denies bleeding.      Most recent echocardiogram shows that the aortic valve stenosis is now critical with a valve area of 0.5 cm2 and a peak velocity approaching 5 meters per second.  His last echocardiogram prior to that was in 2016 and it was only mild at the time with a mean gradient of 19 mmHg.      PHYSICAL EXAMINATION:  He has a 2/6 ejection systolic murmur with mildly diminished second heart sound.  There is no evidence of congestive heart failure.  His EKG today shows atrial fibrillation with a right bundle branch block and a left anterior hemiblock.      IMPRESSION:   1.  Severe/critical aortic stenosis.  Even though his symptoms are mild, I think there would be a case to  intervene on his aortic valve despite the relative lack of symptoms.   2.  Coronary artery disease.  No symptoms of angina.   3.  Atrial fibrillation.  Duration uncertain.  He has a high CHADS2-VASc score, and I have started him on Xarelto today.  Possible side effects were discussed.  I have asked him to discontinue aspirin once he has started on Xarelto.   4.  Stage II chronic renal failure with GFR of 53.   5.  Hypertension, on multiple medications.   6.  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      I requested TAVR CT today as well.  I will refer him to our Valve Clinic for further assessment.         SANDRA ASTUDILLO MD, [facility]             D: 2020   T: 2020   MT: alvarado      Name:     SRIKANTH OBADNO   MRN:      [id number]        Account:      [id number]   :      1934           Service Date: 2020      Document: [id number]           Outpatient Encounter Medications as of 2020   Medication Sig Dispense Refill     albuterol (PROAIR HFA) 108 (90 Base) MCG/ACT inhaler INHALE 1 TO 2 PUFFS EVERY 4 TO 6 HOURS AS NEEDED 1 Inhaler 11     budesonide (PULMICORT) 0.5 MG/2ML nebulizer solution Take 2 mLs (0.5 mg) by nebulization 2 times daily 120 mL 0     CALTRATE 600 + D 600-200 MG-IU OR TABS TAKE 1 TABLET BY MOUTH DAILY 30 0     ferrous sulfate (IRON) 325 (65 Fe) MG tablet TAKE 1 TABLET(325 MG) BY MOUTH TWICE DAILY 180 tablet 0     ipratropium - albuterol 0.5 mg/2.5 mg/3 mL (DUONEB) 0.5-2.5 (3) MG/3ML nebulization Inhale 1 vial (3 mLs) into the lungs 4 times daily 360 mL 11     lisinopril (PRINIVIL/ZESTRIL) 20 MG tablet Take 1 tablet (20 mg) by mouth daily 90 tablet 1     metoprolol tartrate (LOPRESSOR) 50 MG tablet TAKE 1/2 TABLET(25 MG) BY MOUTH TWICE DAILY 90 tablet 3     montelukast (SINGULAIR) 10 MG tablet TAKE 1 TABLET(10 MG) BY MOUTH DAILY 90 tablet 1     Multiple Vitamins-Minerals (MULTIVITAMIN PO) Take 1 tablet by mouth daily       rivaroxaban  ANTICOAGULANT (XARELTO ANTICOAGULANT) 20 MG TABS tablet Take 1 tablet (20 mg) by mouth daily (with dinner) 30 tablet 3     simvastatin (ZOCOR) 40 MG tablet Take 1 tablet (40 mg) by mouth At Bedtime 90 tablet 3     [DISCONTINUED] ASPIRIN PO Take 325 mg by mouth daily       [DISCONTINUED] ketoconazole (NIZORAL) 2 % external cream Apply topically 2 times daily To affected area 30 g 1     No facility-administered encounter medications on file as of 1/22/2020.        Again, thank you for allowing me to participate in the care of your patient.      Sincerely,    DR SANDRA ASTUDILLO MD     [facility]

## 2020-01-22 NOTE — PROGRESS NOTES
HPI and Plan:   See dictation    Orders Placed This Encounter   Procedures     CT Angiogram TAVR     Follow-Up with Cardiologist     EKG 12-lead complete w/read - Clinics (performed today)       Orders Placed This Encounter   Medications     rivaroxaban ANTICOAGULANT (XARELTO ANTICOAGULANT) 20 MG TABS tablet     Sig: Take 1 tablet (20 mg) by mouth daily (with dinner)     Dispense:  30 tablet     Refill:  3       Encounter Diagnosis   Name Primary?     Nonrheumatic aortic valve stenosis Yes       CURRENT MEDICATIONS:  Current Outpatient Medications   Medication Sig Dispense Refill     albuterol (PROAIR HFA) 108 (90 Base) MCG/ACT inhaler INHALE 1 TO 2 PUFFS EVERY 4 TO 6 HOURS AS NEEDED 1 Inhaler 11     budesonide (PULMICORT) 0.5 MG/2ML nebulizer solution Take 2 mLs (0.5 mg) by nebulization 2 times daily 120 mL 0     CALTRATE 600 + D 600-200 MG-IU OR TABS TAKE 1 TABLET BY MOUTH DAILY 30 0     ferrous sulfate (IRON) 325 (65 Fe) MG tablet TAKE 1 TABLET(325 MG) BY MOUTH TWICE DAILY 180 tablet 0     ipratropium - albuterol 0.5 mg/2.5 mg/3 mL (DUONEB) 0.5-2.5 (3) MG/3ML nebulization Inhale 1 vial (3 mLs) into the lungs 4 times daily 360 mL 11     lisinopril (PRINIVIL/ZESTRIL) 20 MG tablet Take 1 tablet (20 mg) by mouth daily 90 tablet 1     metoprolol tartrate (LOPRESSOR) 50 MG tablet TAKE 1/2 TABLET(25 MG) BY MOUTH TWICE DAILY 90 tablet 3     montelukast (SINGULAIR) 10 MG tablet TAKE 1 TABLET(10 MG) BY MOUTH DAILY 90 tablet 1     Multiple Vitamins-Minerals (MULTIVITAMIN PO) Take 1 tablet by mouth daily       rivaroxaban ANTICOAGULANT (XARELTO ANTICOAGULANT) 20 MG TABS tablet Take 1 tablet (20 mg) by mouth daily (with dinner) 30 tablet 3     simvastatin (ZOCOR) 40 MG tablet Take 1 tablet (40 mg) by mouth At Bedtime 90 tablet 3       ALLERGIES     Allergies   Allergen Reactions     No Known Drug Allergies        PAST MEDICAL HISTORY:  Past Medical History:   Diagnosis Date     Allergic rhinitis, cause unspecified       Anemia, unspecified      Aortic stenosis, mild      Benign neoplasm of colon     Ademonatous polyp     CKD (chronic kidney disease) stage 3, GFR 30-59 ml/min (H) 2011     Coronary atherosclerosis of unspecified type of vessel, native or graft     s/p CABG      Hyperlipidemia LDL goal < 100      Hypertension goal BP (blood pressure) < 140/90      Localized osteoarthrosis not specified whether primary or secondary, lower leg     left knee     Moderate persistent asthma      Unspecified hearing loss        PAST SURGICAL HISTORY:  Past Surgical History:   Procedure Laterality Date     C NONSPECIFIC PROCEDURE      Coronary artery bypass     CARDIAC SURGERY       HC COLONOSCOPY THRU STOMA W BIOPSY/CAUTERY TUMOR/POLYP/LESION      Dr. Dow, Q 5 years     HC COLONOSCOPY THRU STOMA W BIOPSY/CAUTERY TUMOR/POLYP/LESION      Dr. Dow, one adenomatous polyp     HC ESOPHAGOSCOPY, DIAGNOSTIC      Dr. Dow, lower esophageal ring & mild gastritis     HERNIORRHAPHY INGUINAL Right 2016    Procedure: HERNIORRHAPHY INGUINAL;  Surgeon: Alexis Alexandra MD;  Location: [facility]     LAPAROSCOPIC CHOLECYSTECTOMY      for biliary sludge       FAMILY HISTORY:  Family History   Problem Relation Age of Onset     C.A.D. Father      Cancer Mother         breast cancer,  of pneumonia     Cerebrovascular Disease Son      CABG Son      Family History Negative Child      Family History Negative Sister      Heart Disease Brother        SOCIAL HISTORY:  Social History     Socioeconomic History     Marital status:      Spouse name: Kailey     Number of children: 3     Years of education: None     Highest education level: None   Occupational History     Occupation: [employer]     Employer: RETIRED   Social Needs     Financial resource strain: None     Food insecurity:     Worry: None     Inability: None     Transportation needs:     Medical: None     Non-medical: None   Tobacco Use     Smoking  status: Former Smoker     Types: Cigarettes     Smokeless tobacco: Never Used     Tobacco comment: smoked for a week in 20's   Substance and Sexual Activity     Alcohol use: No     Alcohol/week: 0.0 standard drinks     Drug use: No     Sexual activity: Yes     Partners: Female   Lifestyle     Physical activity:     Days per week: None     Minutes per session: None     Stress: None   Relationships     Social connections:     Talks on phone: None     Gets together: None     Attends Jewish service: None     Active member of club or organization: None     Attends meetings of clubs or organizations: None     Relationship status: None     Intimate partner violence:     Fear of current or ex partner: None     Emotionally abused: None     Physically abused: None     Forced sexual activity: None   Other Topics Concern      Service No     Blood Transfusions No     Caffeine Concern Not Asked     Occupational Exposure Not Asked     Hobby Hazards Not Asked     Sleep Concern Not Asked     Stress Concern Not Asked     Weight Concern Not Asked     Special Diet Not Asked     Back Care Not Asked     Exercise Yes     Bike Helmet Not Asked     Seat Belt Yes     Self-Exams No     Parent/sibling w/ CABG, MI or angioplasty before 65F 55M? Yes   Social History Narrative    Balanced Diet - Yes    Osteoporosis Preventative measures-  Dairy servings per day: 2 to 3 servings daily    Regular Exercise -  Yes Describe walks 1.5 mile daily     Dental Exam up - YES - Date: 2 years ago    Eye Exam - YES - Date: 1 year ago    Self Testicular Exam -  No, handout given    Do you have any concerns about STD's -  No    Abuse: Current or Past (Physical, Sexual or Emotional)- No    Do you feel safe in your environment - Yes    Guns stored in the home - Yes, locked    Sunscreen used - No    Seatbelts used - Yes    Lipids - YES - Date: 3-09    Glucose -  YES - Date: 3-09    Colon Cancer Screening - Colonoscopy 3-08(date completed)    Hemoccults -  "UNKNOWN    PSA - YES - Date: 11-07    Digital Rectal Exam - UNKNOWN    Immunizations reviewed and up to date - Yes, td 1-2005, had shingles vaccine    5-28-09  JANEY Patel CMA                       Review of Systems:  Skin:  Negative     Eyes:  Positive for glasses  ENT:    deafness;hearing loss  Respiratory:  Positive for dyspnea on exertion  Cardiovascular:  Negative;chest pain;syncope or near-syncope;fatigue Positive for  Gastroenterology: Positive for heartburn  Genitourinary:  Negative    Musculoskeletal:  Positive for nocturnal cramping  Neurologic:  Positive for numbness or tingling of hands  Psychiatric:  Negative    Heme/Lymph/Imm:  Positive for anemia  Endocrine:  Negative      Physical Exam:  Vitals: BP (!) 146/73 (BP Location: Left arm, Cuff Size: Adult Large)   Pulse 64   Ht 1.67 m (5' 5.75\")   Wt 78.3 kg (172 lb 9.6 oz)   BMI 28.07 kg/m      Constitutional:  cooperative, alert and oriented, well developed, well nourished, in no acute distress        Skin:  warm and dry to the touch, no apparent skin lesions or masses noted          Head:  normocephalic, no masses or lesions        Eyes:  pupils equal and round, conjunctivae and lids unremarkable, sclera white, no xanthalasma, EOMS intact, no nystagmus        Lymph:No Cervical lymphadenopathy present     ENT:  no pallor or cyanosis, dentition good        Neck:  JVP normal        Respiratory:  normal breath sounds, clear to auscultation, normal A-P diameter, normal symmetry, normal respiratory excursion, no use of accessory muscles         Cardiac: apical impulse not displaced irregular rhythm soft or inaudible A2   systolic murmur grade 2        pulses below the femoral arteries are diminished                                      GI:  abdomen soft, non-tender, BS normoactive, no mass, no HSM, no bruits        Extremities and Muscular Skeletal:  no deformities, clubbing, cyanosis, erythema observed              Neurological:  no gross motor deficits    "     Psych:  Alert and Oriented x 3        Recent Lab Results:  LIPID RESULTS:  Lab Results   Component Value Date    CHOL 136 06/12/2019    HDL 46 06/12/2019    LDL 74 06/12/2019    TRIG 82 06/12/2019    CHOLHDLRATIO 3.0 07/07/2015       LIVER ENZYME RESULTS:  Lab Results   Component Value Date    AST 29 06/12/2019    ALT 41 06/12/2019       CBC RESULTS:  Lab Results   Component Value Date    WBC 5.2 06/22/2019    RBC 5.50 06/22/2019    HGB 11.6 (L) 06/22/2019    HCT 35.6 (L) 06/22/2019    MCV 65 (L) 06/22/2019    MCH 21.1 (L) 06/22/2019    MCHC 32.6 06/22/2019    RDW 16.4 (H) 06/22/2019    PLT 93 (L) 06/22/2019       BMP RESULTS:  Lab Results   Component Value Date     06/22/2019    POTASSIUM 5.1 06/22/2019    CHLORIDE 107 06/22/2019    CO2 29 06/22/2019    ANIONGAP 3 06/22/2019     (H) 06/22/2019    BUN 29 06/22/2019    CR 1.24 06/22/2019    GFRESTIMATED 53 (L) 06/22/2019    GFRESTBLACK 61 06/22/2019    AYALA 9.2 06/22/2019        A1C RESULTS:  No results found for: A1C    INR RESULTS:  Lab Results   Component Value Date    INR 1.11 03/16/2012           CC  Dany Rodriguez MD  [address] [place] [id number]

## 2020-01-22 NOTE — PROGRESS NOTES
Service Date: 01/22/2020      HISTORY OF PRESENT ILLNESS:  It is a pleasure for me to see Mr. Ingram today for evaluation of aortic stenosis.      This gentleman has been followed up at [facility] for coronary artery disease.  He sustained an acute inferior wall infarction in 2002 with troponin peak of 38.  Angiography revealed multivessel coronary artery disease, though ejection fraction is relatively well preserved.  A balloon pump was placed and he underwent urgent bypass surgery with a LIMA graft placed to the LAD, with separate vein grafts to the OM and the RCA.  When he was last seen in 2012 by Dr. Beard, he was doing well.  The patient himself has known of murmur for the past 6 years.  In terms of symptoms, he tells me that he has always been a little short of breath on exertion, especially going up stairs.  This has not worsened recently.  He denies exertional dizzy spells, syncopal episodes.  He has not had any chest pains.  He has no PND, orthopnea or ankle swelling.  He denies bleeding.      Most recent echocardiogram shows that the aortic valve stenosis is now critical with a valve area of 0.5 cm2 and a peak velocity approaching 5 meters per second.  His last echocardiogram prior to that was in 2016 and it was only mild at the time with a mean gradient of 19 mmHg.      PHYSICAL EXAMINATION:  He has a 2/6 ejection systolic murmur with mildly diminished second heart sound.  There is no evidence of congestive heart failure.  His EKG today shows atrial fibrillation with a right bundle branch block and a left anterior hemiblock.      IMPRESSION:   1.  Severe/critical aortic stenosis.  Even though his symptoms are mild, I think there would be a case to intervene on his aortic valve despite the relative lack of symptoms.   2.  Coronary artery disease.  No symptoms of angina.   3.  Atrial fibrillation.  Duration uncertain.  He has a high CHADS2-VASc score, and I have started him on  Xarelto today.  Possible side effects were discussed.  I have asked him to discontinue aspirin once he has started on Xarelto.   4.  Stage II chronic renal failure with GFR of 53.   5.  Hypertension, on multiple medications.   6.  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      I requested TAVR CT today as well.  I will refer him to our Valve Clinic for further assessment.         SANDRA ASTUDILLO MD, FACC             D: 2020   T: 2020   MT: alvarado      Name:     SRIKANTH OBANDO   MRN:      [id number]        Account:      [id number]   :      1934           Service Date: 2020      Document: [id number]

## 2020-01-22 NOTE — LETTER
1/22/2020    Dany Rodriguez MD, MD  [address] [address]    RE: Shiraz Ingram       Dear Colleague,    I had the pleasure of seeing Shiraz Ingram in the [facility].    HPI and Plan:   See dictation    Orders Placed This Encounter   Procedures     CT Angiogram TAVR     Follow-Up with Cardiologist     EKG 12-lead complete w/read - Clinics (performed today)       Orders Placed This Encounter   Medications     rivaroxaban ANTICOAGULANT (XARELTO ANTICOAGULANT) 20 MG TABS tablet     Sig: Take 1 tablet (20 mg) by mouth daily (with dinner)     Dispense:  30 tablet     Refill:  3       Encounter Diagnosis   Name Primary?     Nonrheumatic aortic valve stenosis Yes       CURRENT MEDICATIONS:  Current Outpatient Medications   Medication Sig Dispense Refill     albuterol (PROAIR HFA) 108 (90 Base) MCG/ACT inhaler INHALE 1 TO 2 PUFFS EVERY 4 TO 6 HOURS AS NEEDED 1 Inhaler 11     budesonide (PULMICORT) 0.5 MG/2ML nebulizer solution Take 2 mLs (0.5 mg) by nebulization 2 times daily 120 mL 0     CALTRATE 600 + D 600-200 MG-IU OR TABS TAKE 1 TABLET BY MOUTH DAILY 30 0     ferrous sulfate (IRON) 325 (65 Fe) MG tablet TAKE 1 TABLET(325 MG) BY MOUTH TWICE DAILY 180 tablet 0     ipratropium - albuterol 0.5 mg/2.5 mg/3 mL (DUONEB) 0.5-2.5 (3) MG/3ML nebulization Inhale 1 vial (3 mLs) into the lungs 4 times daily 360 mL 11     lisinopril (PRINIVIL/ZESTRIL) 20 MG tablet Take 1 tablet (20 mg) by mouth daily 90 tablet 1     metoprolol tartrate (LOPRESSOR) 50 MG tablet TAKE 1/2 TABLET(25 MG) BY MOUTH TWICE DAILY 90 tablet 3     montelukast (SINGULAIR) 10 MG tablet TAKE 1 TABLET(10 MG) BY MOUTH DAILY 90 tablet 1     Multiple Vitamins-Minerals (MULTIVITAMIN PO) Take 1 tablet by mouth daily       rivaroxaban ANTICOAGULANT (XARELTO ANTICOAGULANT) 20 MG TABS tablet Take 1 tablet (20 mg) by mouth daily (with dinner) 30 tablet 3     simvastatin (ZOCOR) 40 MG tablet Take 1 tablet (40 mg) by  mouth At Bedtime 90 tablet 3       ALLERGIES     Allergies   Allergen Reactions     No Known Drug Allergies        PAST MEDICAL HISTORY:  Past Medical History:   Diagnosis Date     Allergic rhinitis, cause unspecified      Anemia, unspecified      Aortic stenosis, mild      Benign neoplasm of colon     Ademonatous polyp     CKD (chronic kidney disease) stage 3, GFR 30-59 ml/min (H) 2011     Coronary atherosclerosis of unspecified type of vessel, native or graft     s/p CABG      Hyperlipidemia LDL goal < 100      Hypertension goal BP (blood pressure) < 140/90      Localized osteoarthrosis not specified whether primary or secondary, lower leg     left knee     Moderate persistent asthma      Unspecified hearing loss        PAST SURGICAL HISTORY:  Past Surgical History:   Procedure Laterality Date     C NONSPECIFIC PROCEDURE      Coronary artery bypass     CARDIAC SURGERY       HC COLONOSCOPY THRU STOMA W BIOPSY/CAUTERY TUMOR/POLYP/LESION      Dr. Dow, Q 5 years     HC COLONOSCOPY THRU STOMA W BIOPSY/CAUTERY TUMOR/POLYP/LESION      Dr. Dow, one adenomatous polyp     HC ESOPHAGOSCOPY, DIAGNOSTIC      Dr. Dow, lower esophageal ring & mild gastritis     HERNIORRHAPHY INGUINAL Right 2016    Procedure: HERNIORRHAPHY INGUINAL;  Surgeon: Alexis Alexandra MD;  Location: [facility]     LAPAROSCOPIC CHOLECYSTECTOMY      for biliary sludge       FAMILY HISTORY:  Family History   Problem Relation Age of Onset     C.A.D. Father      Cancer Mother         breast cancer,  of pneumonia     Cerebrovascular Disease Son      CABG Son      Family History Negative Child      Family History Negative Sister      Heart Disease Brother        SOCIAL HISTORY:  Social History     Socioeconomic History     Marital status:      Spouse name: Kailey     Number of children: 3     Years of education: None     Highest education level: None   Occupational History     Occupation: [employer]      Employer: RETIRED   Social Needs     Financial resource strain: None     Food insecurity:     Worry: None     Inability: None     Transportation needs:     Medical: None     Non-medical: None   Tobacco Use     Smoking status: Former Smoker     Types: Cigarettes     Smokeless tobacco: Never Used     Tobacco comment: smoked for a week in 20's   Substance and Sexual Activity     Alcohol use: No     Alcohol/week: 0.0 standard drinks     Drug use: No     Sexual activity: Yes     Partners: Female   Lifestyle     Physical activity:     Days per week: None     Minutes per session: None     Stress: None   Relationships     Social connections:     Talks on phone: None     Gets together: None     Attends Nondenominational service: None     Active member of club or organization: None     Attends meetings of clubs or organizations: None     Relationship status: None     Intimate partner violence:     Fear of current or ex partner: None     Emotionally abused: None     Physically abused: None     Forced sexual activity: None   Other Topics Concern      Service No     Blood Transfusions No     Caffeine Concern Not Asked     Occupational Exposure Not Asked     Hobby Hazards Not Asked     Sleep Concern Not Asked     Stress Concern Not Asked     Weight Concern Not Asked     Special Diet Not Asked     Back Care Not Asked     Exercise Yes     Bike Helmet Not Asked     Seat Belt Yes     Self-Exams No     Parent/sibling w/ CABG, MI or angioplasty before 65F 55M? Yes   Social History Narrative    Balanced Diet - Yes    Osteoporosis Preventative measures-  Dairy servings per day: 2 to 3 servings daily    Regular Exercise -  Yes Describe walks 1.5 mile daily     Dental Exam up - YES - Date: 2 years ago    Eye Exam - YES - Date: 1 year ago    Self Testicular Exam -  No, handout given    Do you have any concerns about STD's -  No    Abuse: Current or Past (Physical, Sexual or Emotional)- No    Do you feel safe in your environment - Yes     "Guns stored in the home - Yes, locked    Sunscreen used - No    Seatbelts used - Yes    Lipids - YES - Date: 3-09    Glucose -  YES - Date: 3-09    Colon Cancer Screening - Colonoscopy 3-08(date completed)    Hemoccults - UNKNOWN    PSA - YES - Date: 11-07    Digital Rectal Exam - UNKNOWN    Immunizations reviewed and up to date - Yes, td 1-2005, had shingles vaccine    5-28-09  JANEY Patel CMA                       Review of Systems:  Skin:  Negative     Eyes:  Positive for glasses  ENT:    deafness;hearing loss  Respiratory:  Positive for dyspnea on exertion  Cardiovascular:  Negative;chest pain;syncope or near-syncope;fatigue Positive for  Gastroenterology: Positive for heartburn  Genitourinary:  Negative    Musculoskeletal:  Positive for nocturnal cramping  Neurologic:  Positive for numbness or tingling of hands  Psychiatric:  Negative    Heme/Lymph/Imm:  Positive for anemia  Endocrine:  Negative      Physical Exam:  Vitals: BP (!) 146/73 (BP Location: Left arm, Cuff Size: Adult Large)   Pulse 64   Ht 1.67 m (5' 5.75\")   Wt 78.3 kg (172 lb 9.6 oz)   BMI 28.07 kg/m       Constitutional:  cooperative, alert and oriented, well developed, well nourished, in no acute distress        Skin:  warm and dry to the touch, no apparent skin lesions or masses noted          Head:  normocephalic, no masses or lesions        Eyes:  pupils equal and round, conjunctivae and lids unremarkable, sclera white, no xanthalasma, EOMS intact, no nystagmus        Lymph:No Cervical lymphadenopathy present     ENT:  no pallor or cyanosis, dentition good        Neck:  JVP normal        Respiratory:  normal breath sounds, clear to auscultation, normal A-P diameter, normal symmetry, normal respiratory excursion, no use of accessory muscles         Cardiac: apical impulse not displaced irregular rhythm soft or inaudible A2   systolic murmur grade 2        pulses below the femoral arteries are diminished                                  "     GI:  abdomen soft, non-tender, BS normoactive, no mass, no HSM, no bruits        Extremities and Muscular Skeletal:  no deformities, clubbing, cyanosis, erythema observed              Neurological:  no gross motor deficits        Psych:  Alert and Oriented x 3        Recent Lab Results:  LIPID RESULTS:  Lab Results   Component Value Date    CHOL 136 06/12/2019    HDL 46 06/12/2019    LDL 74 06/12/2019    TRIG 82 06/12/2019    CHOLHDLRATIO 3.0 07/07/2015       LIVER ENZYME RESULTS:  Lab Results   Component Value Date    AST 29 06/12/2019    ALT 41 06/12/2019       CBC RESULTS:  Lab Results   Component Value Date    WBC 5.2 06/22/2019    RBC 5.50 06/22/2019    HGB 11.6 (L) 06/22/2019    HCT 35.6 (L) 06/22/2019    MCV 65 (L) 06/22/2019    MCH 21.1 (L) 06/22/2019    MCHC 32.6 06/22/2019    RDW 16.4 (H) 06/22/2019    PLT 93 (L) 06/22/2019       BMP RESULTS:  Lab Results   Component Value Date     06/22/2019    POTASSIUM 5.1 06/22/2019    CHLORIDE 107 06/22/2019    CO2 29 06/22/2019    ANIONGAP 3 06/22/2019     (H) 06/22/2019    BUN 29 06/22/2019    CR 1.24 06/22/2019    GFRESTIMATED 53 (L) 06/22/2019    GFRESTBLACK 61 06/22/2019    AYALA 9.2 06/22/2019        A1C RESULTS:  No results found for: A1C    INR RESULTS:  Lab Results   Component Value Date    INR 1.11 03/16/2012           CC  Dany Rodriguez MD  [address] [place] [id number]                  Service Date: 01/22/2020      HISTORY OF PRESENT ILLNESS:  It is a pleasure for me to see Mr. Ingram today for evaluation of aortic stenosis.      This gentleman has been followed up at [facility] for coronary artery disease.  He sustained an acute inferior wall infarction in 2002 with troponin peak of 38.  Angiography revealed multivessel coronary artery disease, though ejection fraction is relatively well preserved.  A balloon pump was placed and he underwent urgent bypass surgery with a LIMA graft placed to the  LAD, with separate vein grafts to the OM and the RCA.  When he was last seen in 2012 by Dr. Beard, he was doing well.  The patient himself has known of murmur for the past 6 years.  In terms of symptoms, he tells me that he has always been a little short of breath on exertion, especially going up stairs.  This has not worsened recently.  He denies exertional dizzy spells, syncopal episodes.  He has not had any chest pains.  He has no PND, orthopnea or ankle swelling.  He denies bleeding.      Most recent echocardiogram shows that the aortic valve stenosis is now critical with a valve area of 0.5 cm2 and a peak velocity approaching 5 meters per second.  His last echocardiogram prior to that was in 2016 and it was only mild at the time with a mean gradient of 19 mmHg.      PHYSICAL EXAMINATION:  He has a 2/6 ejection systolic murmur with mildly diminished second heart sound.  There is no evidence of congestive heart failure.  His EKG today shows atrial fibrillation with a right bundle branch block and a left anterior hemiblock.      IMPRESSION:   1.  Severe/critical aortic stenosis.  Even though his symptoms are mild, I think there would be a case to intervene on his aortic valve despite the relative lack of symptoms.   2.  Coronary artery disease.  No symptoms of angina.   3.  Atrial fibrillation.  Duration uncertain.  He has a high CHADS2-VASc score, and I have started him on Xarelto today.  Possible side effects were discussed.  I have asked him to discontinue aspirin once he has started on Xarelto.   4.  Stage II chronic renal failure with GFR of 53.   5.  Hypertension, on multiple medications.   6.  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      I requested TAVR CT today as well.  I will refer him to our Valve Clinic for further assessment.         SANDRA ASTUDILLO MD, FACC             D: 01/22/2020   T: 01/22/2020   MT: alvarado      Name:     SRIKANTH OBANDO   MRN:       [id number]        Account:      [id number]   :      1934           Service Date: 2020      Document: [id number]        Thank you for allowing me to participate in the care of your patient.      Sincerely,     DR SANDRA ASTUDILLO MD     [facility]    cc:   Dany Rodriguez MD  [address] [place] [address]

## 2020-02-04 ENCOUNTER — HOSPITAL ENCOUNTER (OUTPATIENT)
Dept: CARDIOLOGY | Facility: CLINIC | Age: 85
Discharge: HOME | End: 2020-02-04
Attending: INTERNAL MEDICINE | Admitting: INTERNAL MEDICINE
Payer: COMMERCIAL

## 2020-02-04 VITALS — HEART RATE: 74 BPM | DIASTOLIC BLOOD PRESSURE: 85 MMHG | SYSTOLIC BLOOD PRESSURE: 156 MMHG

## 2020-02-04 DIAGNOSIS — I35.0 NONRHEUMATIC AORTIC VALVE STENOSIS: ICD-10-CM

## 2020-02-04 LAB
CREAT BLD-MCNC: 1.6 MG/DL (ref 0.66–1.25)
GFR SERPL CREATININE-BSD FRML MDRD: 41 ML/MIN/{1.73_M2}

## 2020-02-04 PROCEDURE — T1013 SIGN LANG/ORAL INTERPRETER: HCPCS | Mod: U3 | Performed by: INTERNAL MEDICINE

## 2020-02-04 PROCEDURE — 82565 ASSAY OF CREATININE: CPT

## 2020-02-04 PROCEDURE — 74174 CTA ABD&PLVS W/CONTRAST: CPT

## 2020-02-04 PROCEDURE — 74174 CTA ABD&PLVS W/CONTRAST: CPT | Mod: 26 | Performed by: INTERNAL MEDICINE

## 2020-02-04 PROCEDURE — 25000128 H RX IP 250 OP 636: Performed by: INTERNAL MEDICINE

## 2020-02-04 PROCEDURE — 25800030 ZZH RX IP 258 OP 636: Performed by: INTERNAL MEDICINE

## 2020-02-04 PROCEDURE — T1013 SIGN LANG/ORAL INTERPRETER: HCPCS | Mod: U3

## 2020-02-04 PROCEDURE — 71275 CT ANGIOGRAPHY CHEST: CPT | Mod: 26 | Performed by: INTERNAL MEDICINE

## 2020-02-04 RX ORDER — IOPAMIDOL 755 MG/ML
500 INJECTION, SOLUTION INTRAVASCULAR ONCE
Status: COMPLETED | OUTPATIENT
Start: 2020-02-04 | End: 2020-02-04

## 2020-02-04 RX ADMIN — IOPAMIDOL 115 ML: 755 INJECTION, SOLUTION INTRAVENOUS at 08:44

## 2020-02-04 RX ADMIN — SODIUM CHLORIDE 100 ML: 9 INJECTION, SOLUTION INTRAVENOUS at 08:44

## 2020-02-06 ENCOUNTER — CARE COORDINATION (OUTPATIENT)
Dept: CARDIOLOGY | Facility: CLINIC | Age: 85
End: 2020-02-06

## 2020-02-06 ENCOUNTER — TELEPHONE (OUTPATIENT)
Dept: CARDIOLOGY | Facility: CLINIC | Age: 85
End: 2020-02-06

## 2020-02-06 LAB — RADIOLOGIST FLAGS: ABNORMAL

## 2020-02-06 NOTE — TELEPHONE ENCOUNTER
"RN called patient's son Willi and reviewed with him Dr. Field's recommendations. Patient's son verbalized understanding and is in agreement with plan for OV on 2/10/20 at 12:45pm to review the test results. RN scheduled patient for this date and time.           Notes recorded by Emir, Jaxon Artis MD on 2/6/2020 at 9:48 AM WILIAM Tolliver, can you pls arrange for this pt and his family to see me ASAP:  Can use my \"new\" pt slot on 2-10, 12:45 pm.  Thanks.  "

## 2020-02-10 ENCOUNTER — TELEPHONE (OUTPATIENT)
Dept: ONCOLOGY | Facility: CLINIC | Age: 85
End: 2020-02-10

## 2020-02-10 ENCOUNTER — OFFICE VISIT (OUTPATIENT)
Dept: CARDIOLOGY | Facility: CLINIC | Age: 85
End: 2020-02-10
Payer: COMMERCIAL

## 2020-02-10 VITALS
HEIGHT: 66 IN | SYSTOLIC BLOOD PRESSURE: 169 MMHG | DIASTOLIC BLOOD PRESSURE: 70 MMHG | HEART RATE: 58 BPM | BODY MASS INDEX: 28.45 KG/M2 | WEIGHT: 177 LBS

## 2020-02-10 DIAGNOSIS — I35.0 NONRHEUMATIC AORTIC VALVE STENOSIS: Primary | ICD-10-CM

## 2020-02-10 PROCEDURE — 99213 OFFICE O/P EST LOW 20 MIN: CPT | Performed by: INTERNAL MEDICINE

## 2020-02-10 PROCEDURE — T1013 SIGN LANG/ORAL INTERPRETER: HCPCS | Mod: U3

## 2020-02-10 ASSESSMENT — MIFFLIN-ST. JEOR: SCORE: 1426.65

## 2020-02-10 NOTE — PROGRESS NOTES
Service Date: 02/10/2020      CLINIC NOTE      HISTORY OF PRESENT ILLNESS:  I had the pleasure of seeing Mr. Ingram again, together with  as well as his son.  Mr. Ingram himself is very hard of hearing which is the reason why I thought it might be best to discuss his CT scan findings with him in person.      I had the pleasure of meeting this gentleman just a few weeks ago, as he has severe aortic stenosis.  For details, please refer to my note from 2020.  As part of his initial workup for possible aortic valve replacement via TAVR, he underwent a CT scan.  Unfortunately, multiple abnormalities were seen in his soft tissues.  He had multiple pulmonary nodules as well as multiple hyperdense masses seen in his kidneys.  There was a small left pleural effusion as well, together with mediastinal and right hilar lymphadenopathy.  These appearances are highly suggestive, though not diagnostic of malignancy.      The patient himself tells me that he is asymptomatic and denying cardiac symptoms.      We discussed what avenues to pursue from here on.  The patient and his son decided to pursue further workup of CT scan findings and I have taken the liberty of referring him to my Oncology colleagues for another opinion.      Further workup and treatment of his aortic valve will be on hold for the time being.         SANDRA ASTUDILLO MD, FACC             D: 02/10/2020   T: 02/10/2020   MT: DANI      Name:     SRIKANTH INGRAM   MRN:      [id number]        Account:      [id number]   :      1934           Service Date: 02/10/2020      Document: [id number]

## 2020-02-10 NOTE — PROGRESS NOTES
HPI and Plan:   See dictation    Orders Placed This Encounter   Procedures     Follow-Up with Cardio-Oncology Clinic       No orders of the defined types were placed in this encounter.      Encounter Diagnosis   Name Primary?     Nonrheumatic aortic valve stenosis Yes       CURRENT MEDICATIONS:  Current Outpatient Medications   Medication Sig Dispense Refill     albuterol (PROAIR HFA) 108 (90 Base) MCG/ACT inhaler INHALE 1 TO 2 PUFFS EVERY 4 TO 6 HOURS AS NEEDED 1 Inhaler 11     budesonide (PULMICORT) 0.5 MG/2ML nebulizer solution Take 2 mLs (0.5 mg) by nebulization 2 times daily 120 mL 0     ferrous sulfate (IRON) 325 (65 Fe) MG tablet TAKE 1 TABLET(325 MG) BY MOUTH TWICE DAILY 180 tablet 0     ipratropium - albuterol 0.5 mg/2.5 mg/3 mL (DUONEB) 0.5-2.5 (3) MG/3ML nebulization Inhale 1 vial (3 mLs) into the lungs 4 times daily 360 mL 11     lisinopril (PRINIVIL/ZESTRIL) 20 MG tablet Take 1 tablet (20 mg) by mouth daily 90 tablet 1     metoprolol tartrate (LOPRESSOR) 50 MG tablet TAKE 1/2 TABLET(25 MG) BY MOUTH TWICE DAILY 90 tablet 3     montelukast (SINGULAIR) 10 MG tablet TAKE 1 TABLET(10 MG) BY MOUTH DAILY 90 tablet 1     Multiple Vitamins-Minerals (MULTIVITAMIN PO) Take 1 tablet by mouth daily       rivaroxaban ANTICOAGULANT (XARELTO ANTICOAGULANT) 20 MG TABS tablet Take 1 tablet (20 mg) by mouth daily (with dinner) 30 tablet 3     simvastatin (ZOCOR) 40 MG tablet Take 1 tablet (40 mg) by mouth At Bedtime 90 tablet 3       ALLERGIES     Allergies   Allergen Reactions     No Known Drug Allergies        PAST MEDICAL HISTORY:  Past Medical History:   Diagnosis Date     Allergic rhinitis, cause unspecified      Anemia, unspecified      Aortic stenosis, mild      Benign neoplasm of colon 1999    Ademonatous polyp     CKD (chronic kidney disease) stage 3, GFR 30-59 ml/min (H) 5/12/2011     Coronary atherosclerosis of unspecified type of vessel, native or graft     s/p CABG 2002     Hyperlipidemia LDL goal < 100       Hypertension goal BP (blood pressure) < 140/90      Localized osteoarthrosis not specified whether primary or secondary, lower leg     left knee     Moderate persistent asthma      Unspecified hearing loss        PAST SURGICAL HISTORY:  Past Surgical History:   Procedure Laterality Date     C NONSPECIFIC PROCEDURE      Coronary artery bypass     CARDIAC SURGERY       HC COLONOSCOPY THRU STOMA W BIOPSY/CAUTERY TUMOR/POLYP/LESION      Dr. Dow, Q 5 years     HC COLONOSCOPY THRU STOMA W BIOPSY/CAUTERY TUMOR/POLYP/LESION      Dr. Dow, one adenomatous polyp     HC ESOPHAGOSCOPY, DIAGNOSTIC      Dr. Dow, lower esophageal ring & mild gastritis     HERNIORRHAPHY INGUINAL Right 2016    Procedure: HERNIORRHAPHY INGUINAL;  Surgeon: Alexis Alexandra MD;  [facility]     LAPAROSCOPIC CHOLECYSTECTOMY      for biliary sludge       FAMILY HISTORY:  Family History   Problem Relation Age of Onset     C.A.D. Father      Cancer Mother         breast cancer,  of pneumonia     Cerebrovascular Disease Son      CABG Son      Family History Negative Child      Family History Negative Sister      Heart Disease Brother        SOCIAL HISTORY:  Social History     Socioeconomic History     Marital status:      Spouse name: Kailey     Number of children: 3     Years of education: None     Highest education level: None   Occupational History     Occupation: [employer]     Employer: RETIRED   Social Needs     Financial resource strain: None     Food insecurity:     Worry: None     Inability: None     Transportation needs:     Medical: None     Non-medical: None   Tobacco Use     Smoking status: Former Smoker     Types: Cigarettes     Smokeless tobacco: Never Used     Tobacco comment: smoked for a week in    Substance and Sexual Activity     Alcohol use: No     Alcohol/week: 0.0 standard drinks     Drug use: No     Sexual activity: Yes     Partners: Female   Lifestyle     Physical activity:      Days per week: None     Minutes per session: None     Stress: None   Relationships     Social connections:     Talks on phone: None     Gets together: None     Attends Pentecostal service: None     Active member of club or organization: None     Attends meetings of clubs or organizations: None     Relationship status: None     Intimate partner violence:     Fear of current or ex partner: None     Emotionally abused: None     Physically abused: None     Forced sexual activity: None   Other Topics Concern      Service No     Blood Transfusions No     Caffeine Concern Not Asked     Occupational Exposure Not Asked     Hobby Hazards Not Asked     Sleep Concern Not Asked     Stress Concern Not Asked     Weight Concern Not Asked     Special Diet Not Asked     Back Care Not Asked     Exercise Yes     Bike Helmet Not Asked     Seat Belt Yes     Self-Exams No     Parent/sibling w/ CABG, MI or angioplasty before 65F 55M? Yes   Social History Narrative    Balanced Diet - Yes    Osteoporosis Preventative measures-  Dairy servings per day: 2 to 3 servings daily    Regular Exercise -  Yes Describe walks 1.5 mile daily     Dental Exam up - YES - Date: 2 years ago    Eye Exam - YES - Date: 1 year ago    Self Testicular Exam -  No, handout given    Do you have any concerns about STD's -  No    Abuse: Current or Past (Physical, Sexual or Emotional)- No    Do you feel safe in your environment - Yes    Guns stored in the home - Yes, locked    Sunscreen used - No    Seatbelts used - Yes    Lipids - YES - Date: 3-09    Glucose -  YES - Date: 3-09    Colon Cancer Screening - Colonoscopy 3-08(date completed)    Hemoccults - UNKNOWN    PSA - YES - Date: 11-07    Digital Rectal Exam - UNKNOWN    Immunizations reviewed and up to date - Yes, td 1-2005, had shingles vaccine    5-28-09  JANEY Patel CMA                       Review of Systems:  Skin:  Negative     Eyes:  Positive for glasses  ENT:    deafness;hearing loss  Respiratory:   "Positive for dyspnea on exertion  Cardiovascular:  Negative    Gastroenterology: Negative    Genitourinary:  Negative    Musculoskeletal:  Negative    Neurologic:  Negative    Psychiatric:  Negative    Heme/Lymph/Imm:  Positive for anemia  Endocrine:  Negative      Physical Exam:  Vitals: BP (!) 169/70   Pulse 58   Ht 1.67 m (5' 5.75\")   Wt 80.3 kg (177 lb)   BMI 28.79 kg/m      Constitutional:  cooperative, alert and oriented, well developed, well nourished, in no acute distress        Skin:  warm and dry to the touch, no apparent skin lesions or masses noted          Head:  normocephalic, no masses or lesions        Eyes:  pupils equal and round, conjunctivae and lids unremarkable, sclera white, no xanthalasma, EOMS intact, no nystagmus        Lymph:No Cervical lymphadenopathy present     ENT:  no pallor or cyanosis, dentition good        Neck:  JVP normal        Respiratory:  normal breath sounds, clear to auscultation, normal A-P diameter, normal symmetry, normal respiratory excursion, no use of accessory muscles         Cardiac: apical impulse not displaced irregular rhythm soft or inaudible A2   systolic murmur grade 2        pulses below the femoral arteries are diminished                                      GI:  abdomen soft, non-tender, BS normoactive, no mass, no HSM, no bruits        Extremities and Muscular Skeletal:  no deformities, clubbing, cyanosis, erythema observed              Neurological:  no gross motor deficits        Psych:  Alert and Oriented x 3        Recent Lab Results:  LIPID RESULTS:  Lab Results   Component Value Date    CHOL 136 06/12/2019    HDL 46 06/12/2019    LDL 74 06/12/2019    TRIG 82 06/12/2019    CHOLHDLRATIO 3.0 07/07/2015       LIVER ENZYME RESULTS:  Lab Results   Component Value Date    AST 29 06/12/2019    ALT 41 06/12/2019       CBC RESULTS:  Lab Results   Component Value Date    WBC 5.2 06/22/2019    RBC 5.50 06/22/2019    HGB 11.6 (L) 06/22/2019    HCT 35.6 (L) " 06/22/2019    MCV 65 (L) 06/22/2019    MCH 21.1 (L) 06/22/2019    MCHC 32.6 06/22/2019    RDW 16.4 (H) 06/22/2019    PLT 93 (L) 06/22/2019       BMP RESULTS:  Lab Results   Component Value Date     06/22/2019    POTASSIUM 5.1 06/22/2019    CHLORIDE 107 06/22/2019    CO2 29 06/22/2019    ANIONGAP 3 06/22/2019     (H) 06/22/2019    BUN 29 06/22/2019    CR 1.24 06/22/2019    GFRESTIMATED 41 (L) 02/04/2020    GFRESTBLACK 50 (L) 02/04/2020    AYALA 9.2 06/22/2019        A1C RESULTS:  No results found for: A1C    INR RESULTS:  Lab Results   Component Value Date    INR 1.11 03/16/2012           CC  No referring provider defined for this encounter.

## 2020-02-10 NOTE — LETTER
2/10/2020    Dany Rodriguez MD, MD  [address] [place] [id number]    RE: Shiraz Ingram       Dear Colleague,    I had the pleasure of seeing Shiraz Ingram in the [facility].    HPI and Plan:   See dictation    Orders Placed This Encounter   Procedures     Follow-Up with Cardio-Oncology Clinic       No orders of the defined types were placed in this encounter.      Encounter Diagnosis   Name Primary?     Nonrheumatic aortic valve stenosis Yes       CURRENT MEDICATIONS:  Current Outpatient Medications   Medication Sig Dispense Refill     albuterol (PROAIR HFA) 108 (90 Base) MCG/ACT inhaler INHALE 1 TO 2 PUFFS EVERY 4 TO 6 HOURS AS NEEDED 1 Inhaler 11     budesonide (PULMICORT) 0.5 MG/2ML nebulizer solution Take 2 mLs (0.5 mg) by nebulization 2 times daily 120 mL 0     ferrous sulfate (IRON) 325 (65 Fe) MG tablet TAKE 1 TABLET(325 MG) BY MOUTH TWICE DAILY 180 tablet 0     ipratropium - albuterol 0.5 mg/2.5 mg/3 mL (DUONEB) 0.5-2.5 (3) MG/3ML nebulization Inhale 1 vial (3 mLs) into the lungs 4 times daily 360 mL 11     lisinopril (PRINIVIL/ZESTRIL) 20 MG tablet Take 1 tablet (20 mg) by mouth daily 90 tablet 1     metoprolol tartrate (LOPRESSOR) 50 MG tablet TAKE 1/2 TABLET(25 MG) BY MOUTH TWICE DAILY 90 tablet 3     montelukast (SINGULAIR) 10 MG tablet TAKE 1 TABLET(10 MG) BY MOUTH DAILY 90 tablet 1     Multiple Vitamins-Minerals (MULTIVITAMIN PO) Take 1 tablet by mouth daily       rivaroxaban ANTICOAGULANT (XARELTO ANTICOAGULANT) 20 MG TABS tablet Take 1 tablet (20 mg) by mouth daily (with dinner) 30 tablet 3     simvastatin (ZOCOR) 40 MG tablet Take 1 tablet (40 mg) by mouth At Bedtime 90 tablet 3       ALLERGIES     Allergies   Allergen Reactions     No Known Drug Allergies        PAST MEDICAL HISTORY:  Past Medical History:   Diagnosis Date     Allergic rhinitis, cause unspecified      Anemia, unspecified      Aortic stenosis, mild      Benign neoplasm of colon 1999     Ademonatous polyp     CKD (chronic kidney disease) stage 3, GFR 30-59 ml/min (H) 2011     Coronary atherosclerosis of unspecified type of vessel, native or graft     s/p CABG      Hyperlipidemia LDL goal < 100      Hypertension goal BP (blood pressure) < 140/90      Localized osteoarthrosis not specified whether primary or secondary, lower leg     left knee     Moderate persistent asthma      Unspecified hearing loss        PAST SURGICAL HISTORY:  Past Surgical History:   Procedure Laterality Date     C NONSPECIFIC PROCEDURE      Coronary artery bypass     CARDIAC SURGERY       HC COLONOSCOPY THRU STOMA W BIOPSY/CAUTERY TUMOR/POLYP/LESION      Dr. Dow, Q 5 years     HC COLONOSCOPY THRU STOMA W BIOPSY/CAUTERY TUMOR/POLYP/LESION      Dr. Dow, one adenomatous polyp     HC ESOPHAGOSCOPY, DIAGNOSTIC      Dr. Dow, lower esophageal ring & mild gastritis     HERNIORRHAPHY INGUINAL Right 2016    Procedure: HERNIORRHAPHY INGUINAL;  Surgeon: Alexis Alexandra MD;  Location: [facility]     LAPAROSCOPIC CHOLECYSTECTOMY      for biliary sludge       FAMILY HISTORY:  Family History   Problem Relation Age of Onset     C.A.D. Father      Cancer Mother         breast cancer,  of pneumonia     Cerebrovascular Disease Son      CABG Son      Family History Negative Child      Family History Negative Sister      Heart Disease Brother        SOCIAL HISTORY:  Social History     Socioeconomic History     Marital status:      Spouse name: Kailey     Number of children: 3     Years of education: None     Highest education level: None   Occupational History     Occupation: [employer]     Employer: RETIRED   Social Needs     Financial resource strain: None     Food insecurity:     Worry: None     Inability: None     Transportation needs:     Medical: None     Non-medical: None   Tobacco Use     Smoking status: Former Smoker     Types: Cigarettes     Smokeless tobacco: Never Used     Tobacco  comment: smoked for a week in 20's   Substance and Sexual Activity     Alcohol use: No     Alcohol/week: 0.0 standard drinks     Drug use: No     Sexual activity: Yes     Partners: Female   Lifestyle     Physical activity:     Days per week: None     Minutes per session: None     Stress: None   Relationships     Social connections:     Talks on phone: None     Gets together: None     Attends Christian service: None     Active member of club or organization: None     Attends meetings of clubs or organizations: None     Relationship status: None     Intimate partner violence:     Fear of current or ex partner: None     Emotionally abused: None     Physically abused: None     Forced sexual activity: None   Other Topics Concern      Service No     Blood Transfusions No     Caffeine Concern Not Asked     Occupational Exposure Not Asked     Hobby Hazards Not Asked     Sleep Concern Not Asked     Stress Concern Not Asked     Weight Concern Not Asked     Special Diet Not Asked     Back Care Not Asked     Exercise Yes     Bike Helmet Not Asked     Seat Belt Yes     Self-Exams No     Parent/sibling w/ CABG, MI or angioplasty before 65F 55M? Yes   Social History Narrative    Balanced Diet - Yes    Osteoporosis Preventative measures-  Dairy servings per day: 2 to 3 servings daily    Regular Exercise -  Yes Describe walks 1.5 mile daily     Dental Exam up - YES - Date: 2 years ago    Eye Exam - YES - Date: 1 year ago    Self Testicular Exam -  No, handout given    Do you have any concerns about STD's -  No    Abuse: Current or Past (Physical, Sexual or Emotional)- No    Do you feel safe in your environment - Yes    Guns stored in the home - Yes, locked    Sunscreen used - No    Seatbelts used - Yes    Lipids - YES - Date: 3-09    Glucose -  YES - Date: 3-09    Colon Cancer Screening - Colonoscopy 3-08(date completed)    Hemoccults - UNKNOWN    PSA - YES - Date: 11-07    Digital Rectal Exam - UNKNOWN    Immunizations  "reviewed and up to date - Yes, td 1-2005, had shingles vaccine    5-28-09  JANEY Patel CMA                       Review of Systems:  Skin:  Negative     Eyes:  Positive for glasses  ENT:    deafness;hearing loss  Respiratory:  Positive for dyspnea on exertion  Cardiovascular:  Negative    Gastroenterology: Negative    Genitourinary:  Negative    Musculoskeletal:  Negative    Neurologic:  Negative    Psychiatric:  Negative    Heme/Lymph/Imm:  Positive for anemia  Endocrine:  Negative      Physical Exam:  Vitals: BP (!) 169/70   Pulse 58   Ht 1.67 m (5' 5.75\")   Wt 80.3 kg (177 lb)   BMI 28.79 kg/m       Constitutional:  cooperative, alert and oriented, well developed, well nourished, in no acute distress        Skin:  warm and dry to the touch, no apparent skin lesions or masses noted          Head:  normocephalic, no masses or lesions        Eyes:  pupils equal and round, conjunctivae and lids unremarkable, sclera white, no xanthalasma, EOMS intact, no nystagmus        Lymph:No Cervical lymphadenopathy present     ENT:  no pallor or cyanosis, dentition good        Neck:  JVP normal        Respiratory:  normal breath sounds, clear to auscultation, normal A-P diameter, normal symmetry, normal respiratory excursion, no use of accessory muscles         Cardiac: apical impulse not displaced irregular rhythm soft or inaudible A2   systolic murmur grade 2        pulses below the femoral arteries are diminished                                      GI:  abdomen soft, non-tender, BS normoactive, no mass, no HSM, no bruits        Extremities and Muscular Skeletal:  no deformities, clubbing, cyanosis, erythema observed              Neurological:  no gross motor deficits        Psych:  Alert and Oriented x 3        Recent Lab Results:  LIPID RESULTS:  Lab Results   Component Value Date    CHOL 136 06/12/2019    HDL 46 06/12/2019    LDL 74 06/12/2019    TRIG 82 06/12/2019    CHOLHDLRATIO 3.0 07/07/2015       LIVER ENZYME " RESULTS:  Lab Results   Component Value Date    AST 29 06/12/2019    ALT 41 06/12/2019       CBC RESULTS:  Lab Results   Component Value Date    WBC 5.2 06/22/2019    RBC 5.50 06/22/2019    HGB 11.6 (L) 06/22/2019    HCT 35.6 (L) 06/22/2019    MCV 65 (L) 06/22/2019    MCH 21.1 (L) 06/22/2019    MCHC 32.6 06/22/2019    RDW 16.4 (H) 06/22/2019    PLT 93 (L) 06/22/2019       BMP RESULTS:  Lab Results   Component Value Date     06/22/2019    POTASSIUM 5.1 06/22/2019    CHLORIDE 107 06/22/2019    CO2 29 06/22/2019    ANIONGAP 3 06/22/2019     (H) 06/22/2019    BUN 29 06/22/2019    CR 1.24 06/22/2019    GFRESTIMATED 41 (L) 02/04/2020    GFRESTBLACK 50 (L) 02/04/2020    AYALA 9.2 06/22/2019        A1C RESULTS:  No results found for: A1C    INR RESULTS:  Lab Results   Component Value Date    INR 1.11 03/16/2012           CC  No referring provider defined for this encounter.                    Thank you for allowing me to participate in the care of your patient.      Sincerely,     DR SANDRA ASTUDILLO MD     [facility]    cc:   No referring provider defined for this encounter.

## 2020-02-10 NOTE — TELEPHONE ENCOUNTER
There is a Cardiac-Oncology referral and after reviewing the office notes from today, pt has pulmonary nodules and kidney masses. Please review and advise who we should be scheduling this patient with.     ThanksEstephania

## 2020-02-10 NOTE — LETTER
2/10/2020      Dany Rodriguez MD, MD  [address] [place] [id number]      RE: Srikanth Ingram       Dear Colleague,    I had the pleasure of seeing Srikanth Ingram in the [facility].    Service Date: 02/10/2020      CLINIC NOTE      HISTORY OF PRESENT ILLNESS:  I had the pleasure of seeing Mr. Ingram again, together with  as well as his son.  Mr. Ingram himself is very hard of hearing which is the reason why I thought it might be best to discuss his CT scan findings with him in person.      I had the pleasure of meeting this gentleman just a few weeks ago, as he has severe aortic stenosis.  For details, please refer to my note from 2020.  As part of his initial workup for possible aortic valve replacement via TAVR, he underwent a CT scan.  Unfortunately, multiple abnormalities were seen in his soft tissues.  He had multiple pulmonary nodules as well as multiple hyperdense masses seen in his kidneys.  There was a small left pleural effusion as well, together with mediastinal and right hilar lymphadenopathy.  These appearances are highly suggestive, though not diagnostic of malignancy.      The patient himself tells me that he is asymptomatic and denying cardiac symptoms.      We discussed what avenues to pursue from here on.  The patient and his son decided to pursue further workup of CT scan findings and I have taken the liberty of referring him to my Oncology colleagues for another opinion.      Further workup and treatment of his aortic valve will be on hold for the time being.         SANDRA ASTUDILLO MD, [facility]          D: 02/10/2020   T: 02/10/2020   MT: DANI      Name:     SRIKANTH INGRAM   MRN:      [id number]        Account:      [id number]   :      1934           Service Date: 02/10/2020      Document: [id number]      Outpatient Encounter Medications as of 2/10/2020   Medication Sig Dispense Refill     albuterol (PROAIR HFA) 108 (90  Base) MCG/ACT inhaler INHALE 1 TO 2 PUFFS EVERY 4 TO 6 HOURS AS NEEDED 1 Inhaler 11     budesonide (PULMICORT) 0.5 MG/2ML nebulizer solution Take 2 mLs (0.5 mg) by nebulization 2 times daily 120 mL 0     ferrous sulfate (IRON) 325 (65 Fe) MG tablet TAKE 1 TABLET(325 MG) BY MOUTH TWICE DAILY 180 tablet 0     ipratropium - albuterol 0.5 mg/2.5 mg/3 mL (DUONEB) 0.5-2.5 (3) MG/3ML nebulization Inhale 1 vial (3 mLs) into the lungs 4 times daily 360 mL 11     lisinopril (PRINIVIL/ZESTRIL) 20 MG tablet Take 1 tablet (20 mg) by mouth daily 90 tablet 1     metoprolol tartrate (LOPRESSOR) 50 MG tablet TAKE 1/2 TABLET(25 MG) BY MOUTH TWICE DAILY 90 tablet 3     montelukast (SINGULAIR) 10 MG tablet TAKE 1 TABLET(10 MG) BY MOUTH DAILY 90 tablet 1     Multiple Vitamins-Minerals (MULTIVITAMIN PO) Take 1 tablet by mouth daily       rivaroxaban ANTICOAGULANT (XARELTO ANTICOAGULANT) 20 MG TABS tablet Take 1 tablet (20 mg) by mouth daily (with dinner) 30 tablet 3     simvastatin (ZOCOR) 40 MG tablet Take 1 tablet (40 mg) by mouth At Bedtime 90 tablet 3     [DISCONTINUED] CALTRATE 600 + D 600-200 MG-IU OR TABS TAKE 1 TABLET BY MOUTH DAILY 30 0     No facility-administered encounter medications on file as of 2/10/2020.      Again, thank you for allowing me to participate in the care of your patient.      Sincerely,    DR SANDRA ASTUDILLO MD     [facility]

## 2020-02-11 NOTE — TELEPHONE ENCOUNTER
Could be seen in urology or oncology, has been set up in oncology. Will be discussed at next tumor  tumor board.    Hallie Kaufman, MSN, RN, OCN  Patient Care Supervisor  [facility]

## 2020-02-21 ENCOUNTER — PATIENT OUTREACH (OUTPATIENT)
Dept: ONCOLOGY | Facility: CLINIC | Age: 85
End: 2020-02-21

## 2020-02-21 NOTE — PROGRESS NOTES
Patient referred for abnormal radiology findings. All records are in [facility].    Writer called and spoke with patient's son, Forrest, introducing myself and my role. I provided a brief description of our location and appointment details.    Cecy Mast RN

## 2020-02-24 ENCOUNTER — PRE VISIT (OUTPATIENT)
Dept: ONCOLOGY | Facility: CLINIC | Age: 85
End: 2020-02-24

## 2020-02-24 ENCOUNTER — ONCOLOGY VISIT (OUTPATIENT)
Dept: ONCOLOGY | Facility: CLINIC | Age: 85
End: 2020-02-24
Attending: INTERNAL MEDICINE
Payer: COMMERCIAL

## 2020-02-24 VITALS
BODY MASS INDEX: 28.45 KG/M2 | WEIGHT: 177 LBS | DIASTOLIC BLOOD PRESSURE: 71 MMHG | SYSTOLIC BLOOD PRESSURE: 164 MMHG | RESPIRATION RATE: 16 BRPM | OXYGEN SATURATION: 95 % | HEART RATE: 87 BPM | HEIGHT: 66 IN

## 2020-02-24 DIAGNOSIS — N28.89 BILATERAL RENAL MASSES: ICD-10-CM

## 2020-02-24 DIAGNOSIS — R91.8 LUNG NODULES: Primary | ICD-10-CM

## 2020-02-24 PROCEDURE — G0463 HOSPITAL OUTPT CLINIC VISIT: HCPCS

## 2020-02-24 PROCEDURE — 99204 OFFICE O/P NEW MOD 45 MIN: CPT | Performed by: INTERNAL MEDICINE

## 2020-02-24 PROCEDURE — T1013 SIGN LANG/ORAL INTERPRETER: HCPCS | Mod: U3

## 2020-02-24 ASSESSMENT — MIFFLIN-ST. JEOR: SCORE: 1426.65

## 2020-02-24 ASSESSMENT — PAIN SCALES - GENERAL: PAINLEVEL: NO PAIN (0)

## 2020-02-24 NOTE — LETTER
"    2/24/2020         RE: Shiraz Ingram  [address]  [facility] [address]        Dear Colleague,    Thank you for referring your patient, Shiraz Ingram, to the [facility]. Please see a copy of my visit note below.    Oncology Rooming Note    February 24, 2020 9:49 AM   Shiraz Ingram is a 85 year old male who presents for:    Chief Complaint   Patient presents with     Oncology Clinic Visit     Initial Vitals: Resp 16   Ht 1.67 m (5' 5.75\")   Wt 80.3 kg (177 lb)   BMI 28.79 kg/m    Estimated body mass index is 28.79 kg/m  as calculated from the following:    Height as of this encounter: 1.67 m (5' 5.75\").    Weight as of this encounter: 80.3 kg (177 lb). Body surface area is 1.93 meters squared.  No Pain (0) Comment: Data Unavailable   No LMP for male patient.  Allergies reviewed: Yes  Medications reviewed: Yes    Medications: Medication refills not needed today.  Pharmacy name entered into [employer]:    [id number] - [place], [place] - [address] AT [address] PHARMACY [place], [place] - [address]    Clinical concerns: no      [facility]              Visit Date:   02/24/2020      ONCOLOGY CONSULTATION      This consult has been requested by Dr. Jaxon Field for lung nodule and renal masses.      The patient is deaf.  The  was there.  Son was also present.      Mr. Ingram is an 85-year-old gentleman with aortic stenosis.  He is following up with cardiologist.  He is being evaluated for TAVR.  CT angiogram for TAVR procedure was done on 02/04/2020.  There is nonspecific mediastinal and right hilar lymphadenopathy.  There are scattered bilateral lung nodules measuring up to 6 mm.  There are multiple renal masses concerning for malignancy.  Because of this abnormality, Oncology consult has been requested.      I found a CT chest without contrast, which was done on 10/23/2008.  It had revealed scattered " noncalcified pulmonary nodules measuring 5 mm or less.  There were multiple homogeneously hyperdense renal lesion in the visualized upper kidney.      The patient does not have any history of malignancy.      REVIEW OF SYSTEMS:  The patient gets shortness of breath on exertion.  Other than that, he is doing good.      No headache.  No dizziness.  No chest pain.  No abdominal pain, nausea or vomiting.  Appetite is good.  No  urinary or bowel complaints.  No bleeding.  No fever, chills or night sweats.  No recent weight loss.  All other review of systems negative.      ALLERGIES:  NONE.      MEDICATIONS:  Reviewed.      PAST MEDICAL HISTORY:   1.  Aortic stenosis.   2.  Chronic kidney disease.   3.  Hyperlipidemia.   4.  Hypertension.   5.  Osteoarthritis.   6.  Asthma.   7.  CABG.   8.  Inguinal hernia repair.   9.  Cholecystectomy.   10.  Chronic thrombocytopenia.   11.  Chronic macrocytic anemia.      SOCIAL HISTORY:   -No history of smoking.   -No alcohol use.      FAMILY HISTORY:    -His mother  of breast cancer.   -No family history of blood disorder.      PHYSICAL EXAMINATION:   GENERAL:  He is alert, oriented x3.   VITAL SIGNS:  Reviewed.  ECOG PS of 2.   The rest of the systems not examined.      LABORATORY DATA:  Reviewed.  1. Multiple labs were done on 2019.   -WBC of 4.7, hemoglobin of 12.4, MCV 66 and platelets of 108.   -Iron of 117 and ferritin of 602.   2. On 2003, hemoglobin of 13.2 with MCV of 66.      ASSESSMENT:   1.  An 85-year-old gentleman with bilateral lung nodules.   2.  Multiple renal masses. ? Malignancy . ? Cysts.   3.  Chronic thrombocytopenia.   4.  Microcytic anemia.  Microcytosis is much more profound compared to anemia, raising the possibility of hemoglobinopathy like thalassemia.   5.  Other medical problems including aortic stenosis, hypertension and chronic kidney disease.      RECOMMENDATION:   1.  I had a long discussion with the patient.  CT scan was reviewed.   CT angiogram was done for TAVR procedure.  It picked up abnormalities including lung nodules and renal masses.      We need to further evaluate.  We will get a CT chest, abdomen and pelvis. Depending on that, we will decide regarding further investigation.  If there are suspicious abnormalities for malignancy, then we may need biopsy and/or PET scan. I will see the patient back after the CT scan.      2.  CBC  was reviewed.  The patient has chronic thrombocytopenia.  He also has chronic microcytic anemia.  Microcytosis is profound in comparison to anemia, raising the possibility of a disorder like thalassemia.      The patient's CBC has been stable for the last few years.  I am not going to do any workup at this time.  If his thrombocytopenia gets worse, he will consider bone marrow biopsy to rule out myelodysplastic syndrome.      3.  The patient and her son had a few questions, which were all answered.      Thanks for the consult.      TOTAL FACE-TO-FACE TIME SPENT:  45 minutes, more than 50% of the time was spent in counseling and coordination of care.         NABIL MENDEZ MD             D: 2020   T: 2020   MT: JOSE JUAN      Name:     SRIKANTH INGRAM   MRN:      [id number]        Account:      [id number]   :      1934           Visit Date:   2020      Document: [id number]       cc: Jaxon Field MD, [facility]     Visit Date:   2020      ONCOLOGY CONSULTATION      This consult has been requested by Dr. Jaxon Field for lung nodule and renal masses.      The patient is deaf.  The  was there.  Son was also present.      Mr. Ingram is an 85-year-old gentleman with aortic stenosis.  He is following up with cardiologist.  He is being evaluated for TAVR.  CT angiogram for TAVR procedure was done on 2020.  There is nonspecific mediastinal and right hilar lymphadenopathy.  There are scattered bilateral lung nodules measuring up to 6 mm.  There are multiple renal masses  concerning for malignancy.  Because of this abnormality, Oncology consult has been requested.      I found a CT chest without contrast, which was done on 10/23/2008.  It had revealed scattered noncalcified pulmonary nodules measuring 5 mm or less.  There were multiple homogeneously hyperdense renal lesion in the visualized upper kidney.      The patient does not have any history of malignancy.      REVIEW OF SYSTEMS:  The patient gets shortness of breath on exertion.  Other than that, he is doing good.      No headache.  No dizziness.  No chest pain.  No abdominal pain, nausea or vomiting.  Appetite is good.  No urinary or bowel complaints.  No bleeding.  No fever, chills or night sweats.  No recent weight loss.  All other review of systems negative.      ALLERGIES:  NONE.      MEDICATIONS:  Reviewed.      PAST MEDICAL HISTORY:   1.  Aortic stenosis.   2.  Chronic kidney disease.   3.  Hyperlipidemia.   4.  Hypertension.   5.  Osteoarthritis.   6.  Asthma.   7.  CABG.   8.  Inguinal hernia repair.   9.  Cholecystectomy.   10.  Chronic thrombocytopenia.   11.  Chronic macrocytic anemia.      SOCIAL HISTORY:   -No history of smoking.   -No alcohol use.      FAMILY HISTORY:    -His mother  of breast cancer.   -No family history of blood disorder.      PHYSICAL EXAMINATION:   GENERAL:  He is alert, oriented x3.   VITAL SIGNS:  Reviewed.  ECOG PS of 2.   The rest of the systems not examined.      LABORATORY DATA:  Reviewed.  1. Multiple labs were done on 2019.   -WBC of 4.7, hemoglobin of 12.4, MCV 66 and platelets of 108.   -Iron of 117 and ferritin of 602.   2. On 2003, hemoglobin of 13.2 with MCV of 66.      ASSESSMENT:   1.  An 85-year-old gentleman with bilateral lung nodules.   2.  Multiple renal masses. ? Malignancy. ? Cysts.   3.  Chronic thrombocytopenia.   4.  Microcytic anemia.  Microcytosis is much more profound compared to anemia, raising the possibility of hemoglobinopathy like thalassemia.    5.  Other medical problems including aortic stenosis, hypertension and chronic kidney disease.      RECOMMENDATION:   1.  I had a long discussion with the patient.  CT scan was reviewed.  CT angiogram was done for TAVR procedure.  It picked up abnormalities including lung nodules and renal masses.      We need to further evaluate.  We will get a CT chest, abdomen and pelvis. Depending on that, we will decide regarding further investigation.  If there are suspicious abnormalities for malignancy, then we may need biopsy and/or PET scan. I will see the patient back after the CT scan.      2.  CBC was reviewed.  The patient has chronic thrombocytopenia.  He also has chronic microcytic anemia.  Microcytosis is profound in comparison to anemia, raising the possibility of a disorder like thalassemia.      The patient's CBC has been stable for the last few years.  I am not going to do any workup at this time.  If his thrombocytopenia gets worse, he will consider bone marrow biopsy to rule out myelodysplastic syndrome.      3.  The patient and her son had a few questions, which were all answered.      Thanks for the consult.      TOTAL FACE-TO-FACE TIME SPENT:  45 minutes, more than 50% of the time was spent in counseling and coordination of care.         NABIL MENDEZ MD             D: 2020   T: 2020   MT: JOSE JUAN      Name:     SRIKANTH OBANDO   MRN:      [id number]        Account:      [id number]   :      1934           Visit Date:   2020      Document: [id number]       [facility]         Again, thank you for allowing me to participate in the care of your patient.        Sincerely,        Nabil Mendez MD

## 2020-02-24 NOTE — PROGRESS NOTES
Visit Date:   2020      ONCOLOGY CONSULTATION      This consult has been requested by Dr. Jaxon Field for lung nodule and renal masses.      The patient is deaf.  The  was there.  Son was also present.      Mr. Ingram is an 85-year-old gentleman with aortic stenosis.  He is following up with cardiologist.  He is being evaluated for TAVR.  CT angiogram for TAVR procedure was done on 2020.  There is nonspecific mediastinal and right hilar lymphadenopathy.  There are scattered bilateral lung nodules measuring up to 6 mm.  There are multiple renal masses concerning for malignancy.  Because of this abnormality, Oncology consult has been requested.      I found a CT chest without contrast, which was done on 10/23/2008.  It had revealed scattered noncalcified pulmonary nodules measuring 5 mm or less.  There were multiple homogeneously hyperdense renal lesion in the visualized upper kidney.      The patient does not have any history of malignancy.      REVIEW OF SYSTEMS:  The patient gets shortness of breath on exertion.  Other than that, he is doing good.      No headache.  No dizziness.  No chest pain.  No abdominal pain, nausea or vomiting.  Appetite is good.  No urinary or bowel complaints.  No bleeding.  No fever, chills or night sweats.  No recent weight loss.  All other review of systems negative.      ALLERGIES:  NONE.      MEDICATIONS:  Reviewed.      PAST MEDICAL HISTORY:   1.  Aortic stenosis.   2.  Chronic kidney disease.   3.  Hyperlipidemia.   4.  Hypertension.   5.  Osteoarthritis.   6.  Asthma.   7.  CABG.   8.  Inguinal hernia repair.   9.  Cholecystectomy.   10.  Chronic thrombocytopenia.   11.  Chronic macrocytic anemia.      SOCIAL HISTORY:   -No history of smoking.   -No alcohol use.      FAMILY HISTORY:    -His mother  of breast cancer.   -No family history of blood disorder.      PHYSICAL EXAMINATION:   GENERAL:  He is alert, oriented x3.   VITAL SIGNS:  Reviewed.   ECOG PS of 2.   The rest of the systems not examined.      LABORATORY DATA:  Reviewed.  1. Multiple labs were done on 06/12/2019.   -WBC of 4.7, hemoglobin of 12.4, MCV 66 and platelets of 108.   -Iron of 117 and ferritin of 602.   2. On 12/23/2003, hemoglobin of 13.2 with MCV of 66.      ASSESSMENT:   1.  An 85-year-old gentleman with bilateral lung nodules.   2.  Multiple renal masses. ? Malignancy. ? Cysts.   3.  Chronic thrombocytopenia.   4.  Microcytic anemia.  Microcytosis is much more profound compared to anemia, raising the possibility of hemoglobinopathy like thalassemia.   5.  Other medical problems including aortic stenosis, hypertension and chronic kidney disease.      RECOMMENDATION:   1.  I had a long discussion with the patient.  CT scan was reviewed.  CT angiogram was done for TAVR procedure.  It picked up abnormalities including lung nodules and renal masses.      We need to further evaluate.  We will get a CT chest, abdomen and pelvis. Depending on that, we will decide regarding further investigation.  If there are suspicious abnormalities for malignancy, then we may need biopsy and/or PET scan. I will see the patient back after the CT scan.      2.  CBC was reviewed.  The patient has chronic thrombocytopenia.  He also has chronic microcytic anemia.  Microcytosis is profound in comparison to anemia, raising the possibility of a disorder like thalassemia.      The patient's CBC has been stable for the last few years.  I am not going to do any workup at this time.  If his thrombocytopenia gets worse, he will consider bone marrow biopsy to rule out myelodysplastic syndrome.      3.  The patient and her son had a few questions, which were all answered.      Thanks for the consult.      TOTAL FACE-TO-FACE TIME SPENT:  45 minutes, more than 50% of the time was spent in counseling and coordination of care.         NABIL MENDEZ MD             D: 02/24/2020   T: 02/24/2020   MT: JOSE JUAN      Name:     TOPHER  SRIKANTH   MRN:      [id number]        Account:      [id number]   :      1934           Visit Date:   2020      Document: [id number]       cc: Jaxon Field MD, FACC

## 2020-02-24 NOTE — PROGRESS NOTES
"Oncology Rooming Note    February 24, 2020 9:49 AM   Shiraz Ingram is a 85 year old male who presents for:    Chief Complaint   Patient presents with     Oncology Clinic Visit     Initial Vitals: Resp 16   Ht 1.67 m (5' 5.75\")   Wt 80.3 kg (177 lb)   BMI 28.79 kg/m   Estimated body mass index is 28.79 kg/m  as calculated from the following:    Height as of this encounter: 1.67 m (5' 5.75\").    Weight as of this encounter: 80.3 kg (177 lb). Body surface area is 1.93 meters squared.  No Pain (0) Comment: Data Unavailable   No LMP for male patient.  Allergies reviewed: Yes  Medications reviewed: Yes    Medications: Medication refills not needed today.  Pharmacy name entered into edPULSE:    [id number] - [place], [place] - [address] AT [facility] [address] [facility] [place], [place] - [address]    Clinical concerns: no      Merlene Fraser CMA            "

## 2020-02-25 ENCOUNTER — HOSPITAL ENCOUNTER (OUTPATIENT)
Dept: CT IMAGING | Facility: CLINIC | Age: 85
Discharge: HOME | End: 2020-02-25
Attending: INTERNAL MEDICINE | Admitting: INTERNAL MEDICINE
Payer: COMMERCIAL

## 2020-02-25 DIAGNOSIS — R91.8 LUNG NODULES: ICD-10-CM

## 2020-02-25 DIAGNOSIS — N28.89 BILATERAL RENAL MASSES: ICD-10-CM

## 2020-02-25 LAB
CREAT BLD-MCNC: 1.7 MG/DL (ref 0.66–1.25)
GFR SERPL CREATININE-BSD FRML MDRD: 39 ML/MIN/{1.73_M2}
RADIOLOGIST FLAGS: ABNORMAL

## 2020-02-25 PROCEDURE — 25000125 ZZHC RX 250: Performed by: INTERNAL MEDICINE

## 2020-02-25 PROCEDURE — 82565 ASSAY OF CREATININE: CPT

## 2020-02-25 PROCEDURE — 74177 CT ABD & PELVIS W/CONTRAST: CPT

## 2020-02-25 PROCEDURE — T1013 SIGN LANG/ORAL INTERPRETER: HCPCS | Mod: U3

## 2020-02-25 PROCEDURE — 25000128 H RX IP 250 OP 636: Performed by: INTERNAL MEDICINE

## 2020-02-25 RX ORDER — IOPAMIDOL 755 MG/ML
86 INJECTION, SOLUTION INTRAVASCULAR ONCE
Status: COMPLETED | OUTPATIENT
Start: 2020-02-25 | End: 2020-02-25

## 2020-02-25 RX ADMIN — IOPAMIDOL 86 ML: 755 INJECTION, SOLUTION INTRAVENOUS at 07:35

## 2020-02-25 RX ADMIN — SODIUM CHLORIDE 65 ML: 9 INJECTION, SOLUTION INTRAVENOUS at 07:35

## 2020-02-26 ENCOUNTER — OFFICE VISIT (OUTPATIENT)
Dept: FAMILY MEDICINE | Facility: CLINIC | Age: 85
End: 2020-02-26
Payer: COMMERCIAL

## 2020-02-26 ENCOUNTER — TELEPHONE (OUTPATIENT)
Dept: CARDIOLOGY | Facility: CLINIC | Age: 85
End: 2020-02-26

## 2020-02-26 VITALS
TEMPERATURE: 98.2 F | WEIGHT: 175.75 LBS | RESPIRATION RATE: 16 BRPM | HEART RATE: 48 BPM | DIASTOLIC BLOOD PRESSURE: 72 MMHG | BODY MASS INDEX: 28.25 KG/M2 | HEIGHT: 66 IN | SYSTOLIC BLOOD PRESSURE: 138 MMHG | OXYGEN SATURATION: 98 %

## 2020-02-26 DIAGNOSIS — N28.1 RENAL CYST: ICD-10-CM

## 2020-02-26 DIAGNOSIS — I10 HYPERTENSION GOAL BP (BLOOD PRESSURE) < 140/90: ICD-10-CM

## 2020-02-26 DIAGNOSIS — I35.0 NONRHEUMATIC AORTIC VALVE STENOSIS: Primary | ICD-10-CM

## 2020-02-26 DIAGNOSIS — I51.3 LEFT ATRIAL THROMBUS: ICD-10-CM

## 2020-02-26 PROCEDURE — 99214 OFFICE O/P EST MOD 30 MIN: CPT | Performed by: FAMILY MEDICINE

## 2020-02-26 PROCEDURE — T1013 SIGN LANG/ORAL INTERPRETER: HCPCS | Mod: U3

## 2020-02-26 ASSESSMENT — MIFFLIN-ST. JEOR: SCORE: 1420.98

## 2020-02-26 NOTE — PROGRESS NOTES
Subjective     Shiraz Ingram is a 85 year old male who presents to clinic today for the following health issues:    HPI   Hyperlipidemia Follow-Up    Are you regularly taking any medication or supplement to lower your cholesterol?   Yes- simvastatin 40mg    Are you having muscle aches or other side effects that you think could be caused by your cholesterol lowering medication?  No    Hypertension Follow-up    Do you check your blood pressure regularly outside of the clinic? No     Are you following a low salt diet? No    Are your blood pressures ever more than 140 on the top number (systolic) OR more   than 90 on the bottom number (diastolic), for example 140/90? No    Asthma Follow-Up  Was ACT completed today?    Yes    ACT Total Scores 2/26/2020   ACT TOTAL SCORE -   ASTHMA ER VISITS -   ASTHMA HOSPITALIZATIONS -   ACT TOTAL SCORE (Goal Greater than or Equal to 20) 17   In the past 12 months, how many times did you visit the emergency room for your asthma without being admitted to the hospital? 0   In the past 12 months, how many times were you hospitalized overnight because of your asthma? 0       How many days per week do you miss taking your asthma controller medication?  I do not have an asthma controller medication    Please describe any recent triggers for your asthma: when air gets thick outside    Have you had any Emergency Room Visits, Urgent Care Visits, or Hospital Admissions since your last office visit?  No      How many servings of fruits and vegetables do you eat daily?  0-1    On average, how many sweetened beverages do you drink each day (Examples: soda, juice, sweet tea, etc.  Do NOT count diet or artificially sweetened beverages)?   Rarely drinks pop    How many days per week do you exercise enough to make your heart beat faster? 3 or less    How many minutes a day do you exercise enough to make your heart beat faster? 9 or less    How many days per week do you miss taking your medication?  "0    At other clinic - they checked bp on left side and it was high.   Today we measured bp on right side.     Started on xarelto.  Upon CT scan of his chest is found to have some abnormalities on his kidneys and he has had a CT scan for his abdomen yesterday.  He is asymptomatic otherwise.       Social History     Occupational History     Occupation: [employer]     Employer: RETIRED   Tobacco Use     Smoking status: Former Smoker     Types: Cigarettes     Smokeless tobacco: Never Used     Tobacco comment: smoked for a week in 20's   Substance and Sexual Activity     Alcohol use: No     Alcohol/week: 0.0 standard drinks     Drug use: No     Sexual activity: Yes     Partners: Female     Allergies   Allergen Reactions     No Known Drug Allergies      Patient Active Problem List   Diagnosis     Coronary atherosclerosis     Benign neoplasm of colon     Localized osteoarthrosis, lower leg     Allergic rhinitis     Moderate persistent asthma     Anemia     Osteoporosis     Hypertension goal BP (blood pressure) < 140/90     Hyperlipidemia with target LDL less than 100     CKD (chronic kidney disease) stage 3, GFR 30-59 ml/min (H)     Nonrheumatic aortic valve stenosis     Advanced directives, counseling/discussion     Iron deficiency anemia     Vitamin B 12 deficiency     Health Care Home     Mild persistent asthma without complication     Reviewed medications, social history and  past medical and surgical history.    Review of system: for general, respiratory, CVS, GI and psychiatry negative except for noted above.     EXAM:  /72 (BP Location: Right arm, Patient Position: Sitting, Cuff Size: Adult Regular)   Pulse (!) 48   Temp 98.2  F (36.8  C) (Oral)   Resp 16   Ht 1.67 m (5' 5.75\")   Wt 79.7 kg (175 lb 12 oz)   SpO2 98%   BMI 28.58 kg/m    Constitutional: healthy, alert and no distress   Psychiatric: mentation appears normal and affect normal/bright  Cardiovascular: systolic murmur, left sided bp " 152/76    ASSESSMENT / PLAN:  (I35.0) Nonrheumatic aortic valve stenosis  (primary encounter diagnosis)  Comment:    Plan: Had cardiac work-up for her valve replacement and had incidental finding which required a CT scan of his abdomen.  Was seen by oncology.  CT scan was done yesterday.  Negative for malignancy    (I10) Hypertension goal BP (blood pressure) < 140/90  Comment:    Plan: Some discrepancy between both upper extremity blood pressure.  At this point I am going to hold off on any intervention until seen by cardiology.  Continue the same Rx.    (N28.1) Renal cyst  Comment:    Plan: No malignancy concern as per CT scan.    (I51.3) Left atrial thrombus  Comment:    Plan: Showed up on the last CT scan.  Already on anticoagulation.  I will route this message to cardiologist as an FYI.  Ordering provider has been notified as per epic.        The above note was dictated using voice recognition. Although reviewed after completion, some word and grammatical error may remain .

## 2020-02-26 NOTE — RESULT ENCOUNTER NOTE
Let patient know that CT scan is not suspicious for cancer.  There are few abnormalities which will be discussed during appointment.    There may be a small clot in left atrium.  He should continue on blood thinner.  I have discussed the case with his cardiologist Dr. Field.  They will follow this.    Joon Morris MD

## 2020-02-26 NOTE — Clinical Note
Dr Field, FYI - patient had negative Ct scanning for malignancy which was ordered by oncology.  It incidentally showed left atrial thrombus.  I just wanted to give you a heads up if he needs any intervention.  As you know he is already on anticoagulation.Karen

## 2020-02-26 NOTE — TELEPHONE ENCOUNTER
Son Willi called and states the patient  Has been cleared to resume cardiology testing. Oncology 2- Had CT chest, pelvis and abdomen  2- for lung nodules and renal masses.  Negative for cancer per son and Oncologist Dr. Morris.  Will forward to Dr. Field with information.

## 2020-02-27 ASSESSMENT — ASTHMA QUESTIONNAIRES: ACT_TOTALSCORE: 17

## 2020-02-27 NOTE — TELEPHONE ENCOUNTER
I heard the good news!.  Pls let him know we will resume valve work up.  I will message Arely Mason.  Thanks. Dr. Field    Patient's son Willi notified of Dr. Field's comment: and is aware the TAVR clinic will be contacting them with follow up appts.  Patient verbalized understanding and agreed to plan of care.

## 2020-02-29 NOTE — PROGRESS NOTES
Visit Date:   2020      ONCOLOGY CONSULTATION      This consult has been requested by Dr. Jaxon Field for lung nodule and renal masses.      The patient is deaf.  The  was there.  Son was also present.      Mr. Ingram is an 85-year-old gentleman with aortic stenosis.  He is following up with cardiologist.  He is being evaluated for TAVR.  CT angiogram for TAVR procedure was done on 2020.  There is nonspecific mediastinal and right hilar lymphadenopathy.  There are scattered bilateral lung nodules measuring up to 6 mm.  There are multiple renal masses concerning for malignancy.  Because of this abnormality, Oncology consult has been requested.      I found a CT chest without contrast, which was done on 10/23/2008.  It had revealed scattered noncalcified pulmonary nodules measuring 5 mm or less.  There were multiple homogeneously hyperdense renal lesion in the visualized upper kidney.      The patient does not have any history of malignancy.      REVIEW OF SYSTEMS:  The patient gets shortness of breath on exertion.  Other than that, he is doing good.      No headache.  No dizziness.  No chest pain.  No abdominal pain, nausea or vomiting.  Appetite is good.  No urinary or bowel complaints.  No bleeding.  No fever, chills or night sweats.  No recent weight loss.  All other review of systems negative.      ALLERGIES:  NONE.      MEDICATIONS:  Reviewed.      PAST MEDICAL HISTORY:   1.  Aortic stenosis.   2.  Chronic kidney disease.   3.  Hyperlipidemia.   4.  Hypertension.   5.  Osteoarthritis.   6.  Asthma.   7.  CABG.   8.  Inguinal hernia repair.   9.  Cholecystectomy.   10.  Chronic thrombocytopenia.   11.  Chronic macrocytic anemia.      SOCIAL HISTORY:   -No history of smoking.   -No alcohol use.      FAMILY HISTORY:    -His mother  of breast cancer.   -No family history of blood disorder.      PHYSICAL EXAMINATION:   GENERAL:  He is alert, oriented x3.   VITAL SIGNS:  Reviewed.   ECOG PS of 2.   The rest of the systems not examined.      LABORATORY DATA:  Reviewed.  1. Multiple labs were done on 06/12/2019.   -WBC of 4.7, hemoglobin of 12.4, MCV 66 and platelets of 108.   -Iron of 117 and ferritin of 602.   2. On 12/23/2003, hemoglobin of 13.2 with MCV of 66.      ASSESSMENT:   1.  An 85-year-old gentleman with bilateral lung nodules.   2.  Multiple renal masses. ? Malignancy. ? Cysts.   3.  Chronic thrombocytopenia.   4.  Microcytic anemia.  Microcytosis is much more profound compared to anemia, raising the possibility of hemoglobinopathy like thalassemia.   5.  Other medical problems including aortic stenosis, hypertension and chronic kidney disease.      RECOMMENDATION:   1.  I had a long discussion with the patient.  CT scan was reviewed.  CT angiogram was done for TAVR procedure.  It picked up abnormalities including lung nodules and renal masses.      We need to further evaluate.  We will get a CT chest, abdomen and pelvis. Depending on that, we will decide regarding further investigation.  If there are suspicious abnormalities for malignancy, then we may need biopsy and/or PET scan. I will see the patient back after the CT scan.      2.  CBC was reviewed.  The patient has chronic thrombocytopenia.  He also has chronic microcytic anemia.  Microcytosis is profound in comparison to anemia, raising the possibility of a disorder like thalassemia.      The patient's CBC has been stable for the last few years.  I am not going to do any workup at this time.  If his thrombocytopenia gets worse, he will consider bone marrow biopsy to rule out myelodysplastic syndrome.      3.  The patient and her son had a few questions, which were all answered.      Thanks for the consult.      TOTAL FACE-TO-FACE TIME SPENT:  45 minutes, more than 50% of the time was spent in counseling and coordination of care.         NABIL MENDEZ MD             D: 02/24/2020   T: 02/24/2020   MT: JOSE JUAN      Name:     TOPHER  SRIKANTH   MRN:      [id number]        Account:      [id number]   :      1934           Visit Date:   2020      Document: [id number]       cc: Jaxon Field MD, FACC

## 2020-03-05 ENCOUNTER — ONCOLOGY VISIT (OUTPATIENT)
Dept: ONCOLOGY | Facility: CLINIC | Age: 85
End: 2020-03-05
Attending: INTERNAL MEDICINE
Payer: COMMERCIAL

## 2020-03-05 VITALS
HEIGHT: 66 IN | WEIGHT: 174.2 LBS | DIASTOLIC BLOOD PRESSURE: 77 MMHG | RESPIRATION RATE: 16 BRPM | BODY MASS INDEX: 28 KG/M2 | HEART RATE: 56 BPM | TEMPERATURE: 97.5 F | OXYGEN SATURATION: 95 % | SYSTOLIC BLOOD PRESSURE: 157 MMHG

## 2020-03-05 DIAGNOSIS — R91.8 LUNG NODULES: Primary | ICD-10-CM

## 2020-03-05 DIAGNOSIS — N28.89 BILATERAL RENAL MASSES: ICD-10-CM

## 2020-03-05 PROCEDURE — 99213 OFFICE O/P EST LOW 20 MIN: CPT | Performed by: INTERNAL MEDICINE

## 2020-03-05 PROCEDURE — T1013 SIGN LANG/ORAL INTERPRETER: HCPCS | Mod: U3

## 2020-03-05 PROCEDURE — G0463 HOSPITAL OUTPT CLINIC VISIT: HCPCS

## 2020-03-05 ASSESSMENT — MIFFLIN-ST. JEOR: SCORE: 1413.92

## 2020-03-05 ASSESSMENT — PAIN SCALES - GENERAL: PAINLEVEL: NO PAIN (0)

## 2020-03-05 NOTE — LETTER
"    3/5/2020         RE: Shiraz Ingram  [address]  [facility] [address]        Dear Colleague,    Thank you for referring your patient, Shiraz Ingram, to the [facility]. Please see a copy of my visit note below.    Oncology Rooming Note    March 5, 2020 9:19 AM   Shiraz Ingram is a 85 year old male who presents for:    Chief Complaint   Patient presents with     Oncology Clinic Visit     pulmonary nodules     Initial Vitals: BP (!) 167/91   Pulse 58   Temp 97.5  F (36.4  C) (Oral)   Resp 16   Ht 1.67 m (5' 5.75\")   SpO2 95%   BMI 28.58 kg/m   Estimated body mass index is 28.58 kg/m  as calculated from the following:    Height as of this encounter: 1.67 m (5' 5.75\").    Weight as of 2/26/20: 79.7 kg (175 lb 12 oz). Body surface area is 1.92 meters squared.  No Pain (0) Comment: Data Unavailable   No LMP for male patient.  Allergies reviewed: Yes  Medications reviewed: Yes    Medications: Medication refills not needed today.  Pharmacy name entered into VitalFields:    [id number] - [place], [place] - [address] AT [facility] [address] [place], [place] - [address]    Clinical concerns:  doctor was notified.      Tosin Jacob, [facility]              Visit Date:   03/05/2020     ONCOLOGY HISTORY: Mr. Ingram is a gentleman with lung nodules and adrenal masses.   1.  CT chest non-contrast on 10/23/2008 revealed scattered noncalcified pulmonary nodules measuring 5 mm or less and multiple homogeneously hyperdense renal lesion. 2.  CT angiogram for TAVR on 02/04/2020 revealed nonspecific mediastinal and right hilar lymphadenopathy, scattered bilateral lung nodules measuring up to 6 mm and  multiple renal masses concerning for malignancy.    3. CT chest, abdomen and pelvis on 02/25/2020 reveals bilateral lung nodules which have been present since 2004 have minimally increased in size.  These are considered benign, as over more than 15 years the " size has just minimally increased.  There are multiple renal simple and complex hemorrhagic cysts, not suspicious for malignancy.      SUBJECTIVE:  Mr. Ingram is an 85-year-old gentleman who was recently seen for lung nodule and adrenal masses.  A CT scan was ordered.      CT chest, abdomen and pelvis was done on 02/25/2020.  Bilateral lung nodules which have been present since 2004 have minimally increased in size.  These are considered benign, as over more than 15 years the size has just minimally increased.  There are multiple renal simple and complex hemorrhagic cysts, not suspicious for malignancy.  There is bronchial wall thickening with debris in the airway.  There is mild mediastinal right hilar lymphadenopathy which is reactive.      I told the patient that based on the CT scan, there is no evidence of any malignancy.  He was happy to know that.      There is a left atrial appendage thrombus.  I have discussed the case with his cardiologist.  They are following him for that.  He is on anticoagulation.      Overall, patient's condition is stable.  He has no new complaints or concerns.      PHYSICAL EXAMINATION:   GENERAL:  He is alert, oriented x 3.   VITAL SIGNS:  Reviewed.  He is not in any distress.   The rest of the systems not examined.      ASSESSMENT:    1.  An 85-year-old gentleman with bilateral lung nodules and renal cysts.   2.  Left atrial appendage thrombus.   3.  Chronic thrombocytopenia.   4.  Microcytic anemia.  Microcytosis is much more profound compared to anemia, raising the possibility of hemoglobinopathy like thalassemia.       PLAN:   1.  CT scan was reviewed with the patient and his son.  A  was also there.  I explained to them there is no evidence of malignancy.  They were happy to know that.  There are a few abnormalities.  We will monitor it.   2.  We will get a CT chest, abdomen and pelvis in 6 months.  I will see him back after that.     3.  He will  continue on anticoagulation and continue to follow up with the cardiologist.   4.  The patient advised to see a physician sooner if he has worsening shortness of breath, coughing of blood, lump, weight loss or any other concerns.         NABIL MENDEZ MD             D: 2020   T: 2020   MT: LEANNE      Name:     SRIKANTH INGRAM   MRN:      [id number]        Account:      [id number]   :      1934           Visit Date:   2020      Document: [id number]      Visit Date:   2020     ONCOLOGY HISTORY: Mr. Ingram is a gentleman with lung nodules and adrenal masses.   1.  CT chest non-contrast on 10/23/2008 revealed scattered noncalcified pulmonary nodules measuring 5 mm or less and multiple homogeneously hyperdense renal lesion. 2.  CT angiogram for TAVR on 2020 revealed nonspecific mediastinal and right hilar lymphadenopathy, scattered bilateral lung nodules measuring up to 6 mm and  multiple renal masses concerning for malignancy.    3. CT chest, abdomen and pelvis on 2020 reveals bilateral lung nodules which have been present since 2004 have minimally increased in size.  These are considered benign, as over more than 15 years the size has just minimally increased.  There are multiple renal simple and complex hemorrhagic cysts, not suspicious for malignancy.      SUBJECTIVE:  Mr. Ingram is an 85-year-old gentleman who was recently seen for lung nodule and adrenal masses.  A CT scan was ordered.      CT chest, abdomen and pelvis was done on 2020.  Bilateral lung nodules which have been present since 2004 have minimally increased in size.  These are considered benign, as over more than 15 years the size has just minimally increased.  There are multiple renal simple and complex hemorrhagic cysts, not suspicious for malignancy.  There is bronchial wall thickening with debris in the airway.  There is mild mediastinal right hilar lymphadenopathy which is reactive.      I told the  patient that based on the CT scan, there is no evidence of any malignancy.  He was happy to know that.      There is a left atrial appendage thrombus.  I have discussed the case with his cardiologist.  They are following him for that.  He is on anticoagulation.      Overall, patient's condition is stable.  He has no new complaints or concerns.      PHYSICAL EXAMINATION:   GENERAL:  He is alert, oriented x 3.   VITAL SIGNS:  Reviewed.  He is not in any distress.   The rest of the systems not examined.      ASSESSMENT:    1.  An 85-year-old gentleman with bilateral lung nodules and renal cysts.   2.  Left atrial appendage thrombus.   3.  Chronic thrombocytopenia.   4.  Microcytic anemia.  Microcytosis is much more profound compared to anemia, raising the possibility of hemoglobinopathy like thalassemia.       PLAN:   1.  CT scan was reviewed with the patient and his son.  A  was also there.  I explained to them there is no evidence of malignancy.  They were happy to know that.  There are a few abnormalities.  We will monitor it.   2.  We will get a CT chest, abdomen and pelvis in 6 months.  I will see him back after that.     3.  He will continue on anticoagulation and continue to follow up with the cardiologist.   4.  The patient advised to see a physician sooner if he has worsening shortness of breath, coughing of blood, lump, weight loss or any other concerns.         NABIL MENDEZ MD             D: 2020   T: 2020   MT: LEANNE      Name:     SRIKANTH OBANDO   MRN:      [id number]        Account:      [id number]   :      1934           Visit Date:   2020      Document: [id number]          Again, thank you for allowing me to participate in the care of your patient.        Sincerely,        Nabil Mendez MD

## 2020-03-05 NOTE — PATIENT INSTRUCTIONS
1. CT scan in 6 months.  2. Follow-up in 6 months.  3. Recheck BP. BP was re-checked. [facility].     Please call patient closer to appt date

## 2020-03-05 NOTE — PROGRESS NOTES
Visit Date:   03/05/2020     ONCOLOGY HISTORY: Mr. Ingram is a gentleman with lung nodules and adrenal masses.   1.  CT chest non-contrast on 10/23/2008 revealed scattered noncalcified pulmonary nodules measuring 5 mm or less and multiple homogeneously hyperdense renal lesion. 2.  CT angiogram for TAVR on 02/04/2020 revealed nonspecific mediastinal and right hilar lymphadenopathy, scattered bilateral lung nodules measuring up to 6 mm and  multiple renal masses concerning for malignancy.    3. CT chest, abdomen and pelvis on 02/25/2020 reveals bilateral lung nodules which have been present since 2004 have minimally increased in size.  These are considered benign, as over more than 15 years the size has just minimally increased.  There are multiple renal simple and complex hemorrhagic cysts, not suspicious for malignancy.      SUBJECTIVE:  Mr. Ingram is an 85-year-old gentleman who was recently seen for lung nodule and adrenal masses.  A CT scan was ordered.      CT chest, abdomen and pelvis was done on 02/25/2020.  Bilateral lung nodules which have been present since 2004 have minimally increased in size.  These are considered benign, as over more than 15 years the size has just minimally increased.  There are multiple renal simple and complex hemorrhagic cysts, not suspicious for malignancy.  There is bronchial wall thickening with debris in the airway.  There is mild mediastinal right hilar lymphadenopathy which is reactive.      I told the patient that based on the CT scan, there is no evidence of any malignancy.  He was happy to know that.      There is a left atrial appendage thrombus.  I have discussed the case with his cardiologist.  They are following him for that.  He is on anticoagulation.      Overall, patient's condition is stable.  He has no new complaints or concerns.      PHYSICAL EXAMINATION:   GENERAL:  He is alert, oriented x 3.   VITAL SIGNS:  Reviewed.  He is not in any distress.   The rest of the  systems not examined.      ASSESSMENT:    1.  An 85-year-old gentleman with bilateral lung nodules and renal cysts.   2.  Left atrial appendage thrombus.   3.  Chronic thrombocytopenia.   4.  Microcytic anemia.  Microcytosis is much more profound compared to anemia, raising the possibility of hemoglobinopathy like thalassemia.       PLAN:   1.  CT scan was reviewed with the patient and his son.  A  was also there.  I explained to them there is no evidence of malignancy.  They were happy to know that.  There are a few abnormalities.  We will monitor it.   2.  We will get a CT chest, abdomen and pelvis in 6 months.  I will see him back after that.     3.  He will continue on anticoagulation and continue to follow up with the cardiologist.   4.  The patient advised to see a physician sooner if he has worsening shortness of breath, coughing of blood, lump, weight loss or any other concerns.         NABIL MENDEZ MD             D: 2020   T: 2020   MTKoffi PERDOMO      Name:     SRIKANTH OBANDO   MRN:      [id number]        Account:      [id number]   :      1934           Visit Date:   2020      Document: [id number]

## 2020-03-05 NOTE — PROGRESS NOTES
"Oncology Rooming Note    March 5, 2020 9:19 AM   Shiarz Ingram is a 85 year old male who presents for:    Chief Complaint   Patient presents with     Oncology Clinic Visit     pulmonary nodules     Initial Vitals: BP (!) 167/91   Pulse 58   Temp 97.5  F (36.4  C) (Oral)   Resp 16   Ht 1.67 m (5' 5.75\")   SpO2 95%   BMI 28.58 kg/m   Estimated body mass index is 28.58 kg/m  as calculated from the following:    Height as of this encounter: 1.67 m (5' 5.75\").    Weight as of 2/26/20: 79.7 kg (175 lb 12 oz). Body surface area is 1.92 meters squared.  No Pain (0) Comment: Data Unavailable   No LMP for male patient.  Allergies reviewed: Yes  Medications reviewed: Yes    Medications: Medication refills not needed today.  Pharmacy name entered into [employer]:    [id number] - [place], [place] - [address] AT [facility] [address]  [facility] [place], [place] - [address]    Clinical concerns:  doctor was notified.      Tosin Jacob CMA            "

## 2020-03-08 NOTE — PROGRESS NOTES
Visit Date:   03/05/2020     ONCOLOGY HISTORY: Mr. Ingram is a gentleman with lung nodules and adrenal masses.   1.  CT chest non-contrast on 10/23/2008 revealed scattered noncalcified pulmonary nodules measuring 5 mm or less and multiple homogeneously hyperdense renal lesion. 2.  CT angiogram for TAVR on 02/04/2020 revealed nonspecific mediastinal and right hilar lymphadenopathy, scattered bilateral lung nodules measuring up to 6 mm and  multiple renal masses concerning for malignancy.    3. CT chest, abdomen and pelvis on 02/25/2020 reveals bilateral lung nodules which have been present since 2004 have minimally increased in size.  These are considered benign, as over more than 15 years the size has just minimally increased.  There are multiple renal simple and complex hemorrhagic cysts, not suspicious for malignancy.      SUBJECTIVE:  Mr. Ingram is an 85-year-old gentleman who was recently seen for lung nodule and adrenal masses.  A CT scan was ordered.      CT chest, abdomen and pelvis was done on 02/25/2020.  Bilateral lung nodules which have been present since 2004 have minimally increased in size.  These are considered benign, as over more than 15 years the size has just minimally increased.  There are multiple renal simple and complex hemorrhagic cysts, not suspicious for malignancy.  There is bronchial wall thickening with debris in the airway.  There is mild mediastinal right hilar lymphadenopathy which is reactive.      I told the patient that based on the CT scan, there is no evidence of any malignancy.  He was happy to know that.      There is a left atrial appendage thrombus.  I have discussed the case with his cardiologist.  They are following him for that.  He is on anticoagulation.      Overall, patient's condition is stable.  He has no new complaints or concerns.      PHYSICAL EXAMINATION:   GENERAL:  He is alert, oriented x 3.   VITAL SIGNS:  Reviewed.  He is not in any distress.   The rest of the  systems not examined.      ASSESSMENT:    1.  An 85-year-old gentleman with bilateral lung nodules and renal cysts.   2.  Left atrial appendage thrombus.   3.  Chronic thrombocytopenia.   4.  Microcytic anemia.  Microcytosis is much more profound compared to anemia, raising the possibility of hemoglobinopathy like thalassemia.       PLAN:   1.  CT scan was reviewed with the patient and his son.  A  was also there.  I explained to them there is no evidence of malignancy.  They were happy to know that.  There are a few abnormalities.  We will monitor it.   2.  We will get a CT chest, abdomen and pelvis in 6 months.  I will see him back after that.     3.  He will continue on anticoagulation and continue to follow up with the cardiologist.   4.  The patient advised to see a physician sooner if he has worsening shortness of breath, coughing of blood, lump, weight loss or any other concerns.         NABIL MENDEZ MD             D: 2020   T: 2020   MTKoffi PERDOMO      Name:     SRIKANTH OBANDO   MRN:      [id number]        Account:      [id number]   :      1934           Visit Date:   2020      Document: [id number]

## 2020-03-16 ENCOUNTER — VIRTUAL VISIT (OUTPATIENT)
Dept: CARDIOLOGY | Facility: CLINIC | Age: 85
End: 2020-03-16
Payer: COMMERCIAL

## 2020-03-16 ENCOUNTER — MEDICAL CORRESPONDENCE (OUTPATIENT)
Dept: HEALTH INFORMATION MANAGEMENT | Facility: CLINIC | Age: 85
End: 2020-03-16

## 2020-03-16 VITALS
BODY MASS INDEX: 27 KG/M2 | DIASTOLIC BLOOD PRESSURE: 91 MMHG | SYSTOLIC BLOOD PRESSURE: 147 MMHG | WEIGHT: 166 LBS | HEART RATE: 61 BPM

## 2020-03-16 DIAGNOSIS — R09.89 ABNORMAL CHEST SOUNDS: ICD-10-CM

## 2020-03-16 DIAGNOSIS — I51.3 THROMBUS OF LEFT ATRIAL APPENDAGE: Primary | ICD-10-CM

## 2020-03-16 DIAGNOSIS — R06.00 DYSPNEA, UNSPECIFIED TYPE: ICD-10-CM

## 2020-03-16 DIAGNOSIS — I35.0 NONRHEUMATIC AORTIC VALVE STENOSIS: Primary | ICD-10-CM

## 2020-03-16 PROCEDURE — 99214 OFFICE O/P EST MOD 30 MIN: CPT | Mod: TEL | Performed by: INTERNAL MEDICINE

## 2020-03-16 NOTE — PROGRESS NOTES
"Shiraz Ingram is a 85 year old male who is being evaluated via a billable telephone visit.      The patient has been notified of following:     \"This telephone visit will be conducted via a call between you and your physician/provider. We have found that certain health care needs can be provided without the need for a physical exam.  This service lets us provide the care you need with a short phone conversation.  If a prescription is necessary we can send it directly to your pharmacy.  If lab work is needed we can place an order for that and you can then stop by our lab to have the test done at a later time.    If during the course of the call the physician/provider feels a telephone visit is not appropriate, you will not be charged for this service.\"       Shiraz Ingram complains of SOB.       is referred by Dr. Field for severe aortic valve stenosis.  His echocardiogram shows valve area 0.5 with mean gradient of greater than 40 mmHg.  His LV function is preserved.  Via phone visit today he did have shortness of breath this morning but this resolved with 3 inhalers.  He has typically not had much in with regards to symptoms.  He denies any chest pain syncope near syncope PND orthopnea or pedal edema.  He had a CT scan performed showing some lymphadenopathy but this was proceed cleared by oncology to proceed.    Patient STS score of 3.3% however is very end-stage and frailty make him a clear TAVR candidate over Sabir.  He will need coronary angiography prior.  However all of patient found to have a left atrial appendage thrombus.  He was placed on Xarelto by Dr. Field.  We will need to have a follow-up ALINE in 2 to 3 months.  Of time to make sure that this is resolved.  If this has not resolved and likely would change him to Coumadin.  In addition there is been a moratorium placed on elective surgeries and therefore due to cold good 19 we would not anticipate elective TAVR on this patient anyway for a number of " months and this will be updated as necessary.      reviewed and updated the patient's Past Medical History, Social History, Family History and Medication List.    ALLERGIES  No known drug allergies    JADE Blanc    Additional provider notes: None    Assessment/Plan:  Severe  Episode SOB likely asthma.  If worsens consider CHF  STS score 3.3%  NYHA Class II  Plan repeat ALINE in 2 months   Holding elective surgeries for COVID 19  I have reviewed the note as documented above.  This accurately captures the substance of my conversation with the patient.        Phone call contact time  Call Started at 12:15  Call Ended at 12:30    RANI ARTHUR MD

## 2020-04-23 ENCOUNTER — HOSPITAL ENCOUNTER (INPATIENT)
Facility: CLINIC | Age: 85
LOS: 5 days | Discharge: HOME | DRG: 286 | End: 2020-04-28
Attending: EMERGENCY MEDICINE | Admitting: HOSPITALIST
Payer: COMMERCIAL

## 2020-04-23 ENCOUNTER — APPOINTMENT (OUTPATIENT)
Dept: GENERAL RADIOLOGY | Facility: CLINIC | Age: 85
DRG: 286 | End: 2020-04-23
Attending: EMERGENCY MEDICINE
Payer: COMMERCIAL

## 2020-04-23 DIAGNOSIS — I35.0 SEVERE AORTIC STENOSIS: Primary | ICD-10-CM

## 2020-04-23 DIAGNOSIS — R09.02 HYPOXIA: ICD-10-CM

## 2020-04-23 DIAGNOSIS — Z95.1 S/P CABG (CORONARY ARTERY BYPASS GRAFT): ICD-10-CM

## 2020-04-23 DIAGNOSIS — I25.10 CORONARY ARTERY DISEASE INVOLVING NATIVE CORONARY ARTERY OF NATIVE HEART WITHOUT ANGINA PECTORIS: ICD-10-CM

## 2020-04-23 DIAGNOSIS — I50.9 ACUTE ON CHRONIC CONGESTIVE HEART FAILURE, UNSPECIFIED HEART FAILURE TYPE (H): ICD-10-CM

## 2020-04-23 DIAGNOSIS — R06.02 SOB (SHORTNESS OF BREATH): ICD-10-CM

## 2020-04-23 DIAGNOSIS — I50.32 CHRONIC HEART FAILURE WITH PRESERVED EJECTION FRACTION (HFPEF) (H): ICD-10-CM

## 2020-04-23 DIAGNOSIS — I35.0 SEVERE AORTIC STENOSIS: ICD-10-CM

## 2020-04-23 PROBLEM — J96.00 ACUTE RESPIRATORY FAILURE (H): Status: ACTIVE | Noted: 2020-04-23

## 2020-04-23 LAB
ALBUMIN SERPL-MCNC: 3.8 G/DL (ref 3.4–5)
ALP SERPL-CCNC: 70 U/L (ref 40–150)
ALT SERPL W P-5'-P-CCNC: 32 U/L (ref 0–70)
ANION GAP SERPL CALCULATED.3IONS-SCNC: 6 MMOL/L (ref 3–14)
AST SERPL W P-5'-P-CCNC: 27 U/L (ref 0–45)
BASE EXCESS BLDV CALC-SCNC: 0.7 MMOL/L
BASOPHILS # BLD AUTO: 0 10E9/L (ref 0–0.2)
BASOPHILS NFR BLD AUTO: 0.4 %
BILIRUB SERPL-MCNC: 1 MG/DL (ref 0.2–1.3)
BUN SERPL-MCNC: 28 MG/DL (ref 7–30)
CALCIUM SERPL-MCNC: 9.1 MG/DL (ref 8.5–10.1)
CHLORIDE SERPL-SCNC: 108 MMOL/L (ref 94–109)
CO2 SERPL-SCNC: 26 MMOL/L (ref 20–32)
CREAT SERPL-MCNC: 1.44 MG/DL (ref 0.66–1.25)
DIFFERENTIAL METHOD BLD: ABNORMAL
EOSINOPHIL # BLD AUTO: 0.2 10E9/L (ref 0–0.7)
EOSINOPHIL NFR BLD AUTO: 4.8 %
ERYTHROCYTE [DISTWIDTH] IN BLOOD BY AUTOMATED COUNT: 17.4 % (ref 10–15)
GFR SERPL CREATININE-BSD FRML MDRD: 44 ML/MIN/{1.73_M2}
GLUCOSE SERPL-MCNC: 124 MG/DL (ref 70–99)
HCO3 BLDV-SCNC: 27 MMOL/L (ref 21–28)
HCT VFR BLD AUTO: 38.5 % (ref 40–53)
HGB BLD-MCNC: 12.3 G/DL (ref 13.3–17.7)
IMM GRANULOCYTES # BLD: 0 10E9/L (ref 0–0.4)
IMM GRANULOCYTES NFR BLD: 0.2 %
INR PPP: 2.35 (ref 0.86–1.14)
INTERPRETATION ECG - MUSE: NORMAL
LYMPHOCYTES # BLD AUTO: 0.7 10E9/L (ref 0.8–5.3)
LYMPHOCYTES NFR BLD AUTO: 13.2 %
MCH RBC QN AUTO: 21 PG (ref 26.5–33)
MCHC RBC AUTO-ENTMCNC: 31.9 G/DL (ref 31.5–36.5)
MCV RBC AUTO: 66 FL (ref 78–100)
MONOCYTES # BLD AUTO: 0.4 10E9/L (ref 0–1.3)
MONOCYTES NFR BLD AUTO: 7.8 %
NEUTROPHILS # BLD AUTO: 3.7 10E9/L (ref 1.6–8.3)
NEUTROPHILS NFR BLD AUTO: 73.6 %
NRBC # BLD AUTO: 0 10*3/UL
NRBC BLD AUTO-RTO: 0 /100
NT-PROBNP SERPL-MCNC: 8790 PG/ML (ref 0–1800)
O2/TOTAL GAS SETTING VFR VENT: NORMAL %
OXYHGB MFR BLDV: 61 %
PCO2 BLDV: 48 MM HG (ref 40–50)
PH BLDV: 7.36 PH (ref 7.32–7.43)
PLATELET # BLD AUTO: 103 10E9/L (ref 150–450)
PO2 BLDV: 38 MM HG (ref 25–47)
POTASSIUM SERPL-SCNC: 4.3 MMOL/L (ref 3.4–5.3)
PROT SERPL-MCNC: 7.9 G/DL (ref 6.8–8.8)
RBC # BLD AUTO: 5.87 10E12/L (ref 4.4–5.9)
SARS-COV-2 PCR COMMENT: NORMAL
SARS-COV-2 RNA SPEC QL NAA+PROBE: NEGATIVE
SARS-COV-2 RNA SPEC QL NAA+PROBE: NORMAL
SODIUM SERPL-SCNC: 140 MMOL/L (ref 133–144)
SPECIMEN SOURCE: NORMAL
SPECIMEN SOURCE: NORMAL
TROPONIN I SERPL-MCNC: 0.01 UG/L (ref 0–0.04)
TROPONIN I SERPL-MCNC: 0.02 UG/L (ref 0–0.04)
TROPONIN I SERPL-MCNC: 0.02 UG/L (ref 0–0.04)
WBC # BLD AUTO: 5 10E9/L (ref 4–11)

## 2020-04-23 PROCEDURE — 82805 BLOOD GASES W/O2 SATURATION: CPT | Performed by: EMERGENCY MEDICINE

## 2020-04-23 PROCEDURE — 71045 X-RAY EXAM CHEST 1 VIEW: CPT

## 2020-04-23 PROCEDURE — 40000275 ZZH STATISTIC RCP TIME EA 10 MIN

## 2020-04-23 PROCEDURE — 94640 AIRWAY INHALATION TREATMENT: CPT

## 2020-04-23 PROCEDURE — 87635 SARS-COV-2 COVID-19 AMP PRB: CPT | Performed by: EMERGENCY MEDICINE

## 2020-04-23 PROCEDURE — 99223 1ST HOSP IP/OBS HIGH 75: CPT | Performed by: INTERNAL MEDICINE

## 2020-04-23 PROCEDURE — 84484 ASSAY OF TROPONIN QUANT: CPT | Performed by: HOSPITALIST

## 2020-04-23 PROCEDURE — 25000132 ZZH RX MED GY IP 250 OP 250 PS 637: Performed by: HOSPITALIST

## 2020-04-23 PROCEDURE — 76604 US EXAM CHEST: CPT

## 2020-04-23 PROCEDURE — 85610 PROTHROMBIN TIME: CPT | Performed by: EMERGENCY MEDICINE

## 2020-04-23 PROCEDURE — 99285 EMERGENCY DEPT VISIT HI MDM: CPT | Mod: 25

## 2020-04-23 PROCEDURE — 25000128 H RX IP 250 OP 636: Performed by: HOSPITALIST

## 2020-04-23 PROCEDURE — 80053 COMPREHEN METABOLIC PANEL: CPT | Performed by: EMERGENCY MEDICINE

## 2020-04-23 PROCEDURE — 85025 COMPLETE CBC W/AUTO DIFF WBC: CPT | Performed by: EMERGENCY MEDICINE

## 2020-04-23 PROCEDURE — 25000131 ZZH RX MED GY IP 250 OP 636 PS 637: Performed by: EMERGENCY MEDICINE

## 2020-04-23 PROCEDURE — 99223 1ST HOSP IP/OBS HIGH 75: CPT | Mod: AI | Performed by: HOSPITALIST

## 2020-04-23 PROCEDURE — 25000128 H RX IP 250 OP 636: Performed by: EMERGENCY MEDICINE

## 2020-04-23 PROCEDURE — 84484 ASSAY OF TROPONIN QUANT: CPT | Performed by: EMERGENCY MEDICINE

## 2020-04-23 PROCEDURE — 25000132 ZZH RX MED GY IP 250 OP 250 PS 637: Performed by: INTERNAL MEDICINE

## 2020-04-23 PROCEDURE — 25000131 ZZH RX MED GY IP 250 OP 636 PS 637: Performed by: HOSPITALIST

## 2020-04-23 PROCEDURE — 25000125 ZZHC RX 250: Performed by: INTERNAL MEDICINE

## 2020-04-23 PROCEDURE — 93005 ELECTROCARDIOGRAM TRACING: CPT

## 2020-04-23 PROCEDURE — 25000128 H RX IP 250 OP 636: Performed by: INTERNAL MEDICINE

## 2020-04-23 PROCEDURE — 36415 COLL VENOUS BLD VENIPUNCTURE: CPT | Performed by: HOSPITALIST

## 2020-04-23 PROCEDURE — 99207 ZZC NON-BILLABLE SERV PER CHARTING: CPT | Performed by: INTERNAL MEDICINE

## 2020-04-23 PROCEDURE — 21000001 ZZH R&B HEART CARE

## 2020-04-23 PROCEDURE — 96374 THER/PROPH/DIAG INJ IV PUSH: CPT

## 2020-04-23 PROCEDURE — 25000131 ZZH RX MED GY IP 250 OP 636 PS 637: Performed by: INTERNAL MEDICINE

## 2020-04-23 PROCEDURE — 83880 ASSAY OF NATRIURETIC PEPTIDE: CPT | Performed by: EMERGENCY MEDICINE

## 2020-04-23 RX ORDER — LABETALOL HYDROCHLORIDE 5 MG/ML
10 INJECTION, SOLUTION INTRAVENOUS
Status: DISCONTINUED | OUTPATIENT
Start: 2020-04-23 | End: 2020-04-28 | Stop reason: HOSPADM

## 2020-04-23 RX ORDER — LISINOPRIL 20 MG/1
20 TABLET ORAL DAILY
Status: DISCONTINUED | OUTPATIENT
Start: 2020-04-23 | End: 2020-04-28 | Stop reason: HOSPADM

## 2020-04-23 RX ORDER — PREDNISONE 20 MG/1
60 TABLET ORAL ONCE
Status: COMPLETED | OUTPATIENT
Start: 2020-04-23 | End: 2020-04-23

## 2020-04-23 RX ORDER — FUROSEMIDE 10 MG/ML
40 INJECTION INTRAMUSCULAR; INTRAVENOUS ONCE
Status: COMPLETED | OUTPATIENT
Start: 2020-04-23 | End: 2020-04-23

## 2020-04-23 RX ORDER — PREDNISONE 20 MG/1
60 TABLET ORAL 2 TIMES DAILY WITH MEALS
Status: DISCONTINUED | OUTPATIENT
Start: 2020-04-23 | End: 2020-04-24

## 2020-04-23 RX ORDER — BUDESONIDE 0.5 MG/2ML
0.5 INHALANT ORAL 2 TIMES DAILY
Status: DISCONTINUED | OUTPATIENT
Start: 2020-04-23 | End: 2020-04-28 | Stop reason: HOSPADM

## 2020-04-23 RX ORDER — ALBUTEROL SULFATE 90 UG/1
1-2 AEROSOL, METERED RESPIRATORY (INHALATION)
Status: DISCONTINUED | OUTPATIENT
Start: 2020-04-23 | End: 2020-04-28 | Stop reason: HOSPADM

## 2020-04-23 RX ORDER — BISACODYL 10 MG
10 SUPPOSITORY, RECTAL RECTAL DAILY PRN
Status: DISCONTINUED | OUTPATIENT
Start: 2020-04-23 | End: 2020-04-28 | Stop reason: HOSPADM

## 2020-04-23 RX ORDER — FUROSEMIDE 10 MG/ML
60 INJECTION INTRAMUSCULAR; INTRAVENOUS
Status: DISCONTINUED | OUTPATIENT
Start: 2020-04-23 | End: 2020-04-24

## 2020-04-23 RX ORDER — AMOXICILLIN 250 MG
2 CAPSULE ORAL 2 TIMES DAILY PRN
Status: DISCONTINUED | OUTPATIENT
Start: 2020-04-23 | End: 2020-04-28 | Stop reason: HOSPADM

## 2020-04-23 RX ORDER — LIDOCAINE 40 MG/G
CREAM TOPICAL
Status: DISCONTINUED | OUTPATIENT
Start: 2020-04-23 | End: 2020-04-28 | Stop reason: HOSPADM

## 2020-04-23 RX ORDER — ONDANSETRON 4 MG/1
4 TABLET, ORALLY DISINTEGRATING ORAL EVERY 6 HOURS PRN
Status: DISCONTINUED | OUTPATIENT
Start: 2020-04-23 | End: 2020-04-28 | Stop reason: HOSPADM

## 2020-04-23 RX ORDER — NALOXONE HYDROCHLORIDE 0.4 MG/ML
.1-.4 INJECTION, SOLUTION INTRAMUSCULAR; INTRAVENOUS; SUBCUTANEOUS
Status: DISCONTINUED | OUTPATIENT
Start: 2020-04-23 | End: 2020-04-24

## 2020-04-23 RX ORDER — ONDANSETRON 2 MG/ML
4 INJECTION INTRAMUSCULAR; INTRAVENOUS EVERY 6 HOURS PRN
Status: DISCONTINUED | OUTPATIENT
Start: 2020-04-23 | End: 2020-04-28 | Stop reason: HOSPADM

## 2020-04-23 RX ORDER — ACETAMINOPHEN 325 MG/1
650 TABLET ORAL EVERY 4 HOURS PRN
Status: DISCONTINUED | OUTPATIENT
Start: 2020-04-23 | End: 2020-04-24

## 2020-04-23 RX ORDER — SIMVASTATIN 40 MG
40 TABLET ORAL AT BEDTIME
Status: DISCONTINUED | OUTPATIENT
Start: 2020-04-23 | End: 2020-04-28 | Stop reason: HOSPADM

## 2020-04-23 RX ORDER — ACETAMINOPHEN 650 MG/1
650 SUPPOSITORY RECTAL EVERY 4 HOURS PRN
Status: DISCONTINUED | OUTPATIENT
Start: 2020-04-23 | End: 2020-04-28 | Stop reason: HOSPADM

## 2020-04-23 RX ORDER — METOPROLOL TARTRATE 25 MG/1
25 TABLET, FILM COATED ORAL 2 TIMES DAILY
Status: DISCONTINUED | OUTPATIENT
Start: 2020-04-23 | End: 2020-04-28 | Stop reason: HOSPADM

## 2020-04-23 RX ORDER — POLYETHYLENE GLYCOL 3350 17 G/17G
17 POWDER, FOR SOLUTION ORAL DAILY PRN
Status: DISCONTINUED | OUTPATIENT
Start: 2020-04-23 | End: 2020-04-28 | Stop reason: HOSPADM

## 2020-04-23 RX ORDER — MONTELUKAST SODIUM 10 MG/1
10 TABLET ORAL DAILY
Status: DISCONTINUED | OUTPATIENT
Start: 2020-04-23 | End: 2020-04-28 | Stop reason: HOSPADM

## 2020-04-23 RX ORDER — AMOXICILLIN 250 MG
1 CAPSULE ORAL 2 TIMES DAILY PRN
Status: DISCONTINUED | OUTPATIENT
Start: 2020-04-23 | End: 2020-04-28 | Stop reason: HOSPADM

## 2020-04-23 RX ADMIN — METOPROLOL TARTRATE 25 MG: 25 TABLET ORAL at 08:42

## 2020-04-23 RX ADMIN — PREDNISONE 60 MG: 20 TABLET ORAL at 08:42

## 2020-04-23 RX ADMIN — LISINOPRIL 20 MG: 20 TABLET ORAL at 12:58

## 2020-04-23 RX ADMIN — FUROSEMIDE 40 MG: 10 INJECTION, SOLUTION INTRAVENOUS at 04:47

## 2020-04-23 RX ADMIN — PREDNISONE 60 MG: 20 TABLET ORAL at 17:55

## 2020-04-23 RX ADMIN — PREDNISONE 60 MG: 20 TABLET ORAL at 03:31

## 2020-04-23 RX ADMIN — FUROSEMIDE 60 MG: 10 INJECTION, SOLUTION INTRAMUSCULAR; INTRAVENOUS at 08:43

## 2020-04-23 RX ADMIN — MONTELUKAST 10 MG: 10 TABLET, FILM COATED ORAL at 08:42

## 2020-04-23 RX ADMIN — SIMVASTATIN 40 MG: 40 TABLET, FILM COATED ORAL at 21:35

## 2020-04-23 RX ADMIN — BUDESONIDE 0.5 MG: 0.5 INHALANT RESPIRATORY (INHALATION) at 19:16

## 2020-04-23 RX ADMIN — FUROSEMIDE 60 MG: 10 INJECTION, SOLUTION INTRAMUSCULAR; INTRAVENOUS at 15:08

## 2020-04-23 ASSESSMENT — MIFFLIN-ST. JEOR
SCORE: 1428.35
SCORE: 1430.61

## 2020-04-23 ASSESSMENT — ACTIVITIES OF DAILY LIVING (ADL)
ADLS_ACUITY_SCORE: 12

## 2020-04-23 ASSESSMENT — ENCOUNTER SYMPTOMS
ABDOMINAL PAIN: 0
COUGH: 0
SHORTNESS OF BREATH: 1
SLEEP DISTURBANCE: 1
FEVER: 0

## 2020-04-23 NOTE — PLAN OF CARE
VSS on 2L.  Tele: SR with BBB.  Denies pain or shortness of breath.  Up ind.  Pt deaf-jabber used for assessment.  NPO at midnight for ALINE. Plan for TAVR work-up and surgery next week.  Call light within reach.  Will continue to monitor.

## 2020-04-23 NOTE — CONSULTS
[facility]    Cardiology Consultation     Date of Admission:  4/23/2020    Primary Care Physician   Dany Rodriguez MD     Consult Date:  04/23/2020      REASON FOR CONSULTATION:  Severe aortic stenosis, congestive heart failure.      REFERRING PHYSICIAN:  Hospitalist Service.      IMPRESSION:   1.  Acute exacerbation of diastolic heart failure.   2.  Severe aortic stenosis, aortic valve area 0.5 cm2, mean gradient 52 mmHg and transvalvular velocity 4.9 meters per second.   3.  Chronic coronary artery disease with history of coronary artery bypass surgery.  Preserved left ventricular systolic function.   4.  Persistent, likely permanent atrial fibrillation.   5.  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   6.  Left atrial appendage clot, detected on CT scan for TAVR (transcatheter aortic valve replacement) workup 02/2020.   7.  Hypertension.   8.  Stage II-III chronic renal failure.   9.  Elevated INR, likely due to passive hepatic congestion. Will hold Xarelto for the time being.  10. Asthma  11.  Benign pulmonary nodules and renal cysts.  12.  Senile hearing loss, communication via sign language.     This is a very delightful gentleman with a long history of coronary artery disease and recently diagnosed severe aortic stenosis, though he has known about a murmur for quite some time.  He is now admitted with diastolic heart failure exacerbation.  I think this may be secondary to dietary noncompliance as he has been eating a few hamburgers and hot dogs recently.  During this admission, I think it would be ideal if he could have TAVR.  Prior to this happening, we will definitely have to improve on his CHF, and we will diurese him gently.  He should have coronary angiography beforehand.  Risks of procedure including but not limited to MI, CVA, aortic dissection, renal injury, allergic reaction, peripheral vascular injury explained to pt.  He has had  this before, and he understands.  We will wait until his INR drifts below 2 before proceeding with angio.  As for his left atrial appendage clot, this was detected towards the end of 02/2020, and he has been on Xarelto nonstop since then.  I will have him undergo transesophageal echocardiography for further assessment.  Hopefully, the thrombus with have disappeared by now.      It is my pleasure to follow this very delightful gentleman with you during his hospitalization.      HISTORY OF PRESENT ILLNESS:  Mr. Ingram is a very pleasant 85-year-old gentleman who is very hard of hearing.  I have had the pleasure of meeting him in clinic for evaluation of his aortic stenosis.      This gentleman has a history of acute inferior infarction in 2002 with a peak troponin of 38.  Angiography revealed multivessel coronary artery disease, though ejection fraction was relatively well preserved.  A balloon pump was placed, and he underwent emergent coronary artery bypass surgery with a LIMA graft inserted to the LAD and separate vein grafts to the OM and RCA.  He has been followed up intermittently by us since then.  Prior to his clinic visit with me in the earlier part of this year, he was last seen by Dr. Josee Beard in 2012, and he was doing well at that time.      The patient has known about a murmur for quite some time.  An echocardiogram just prior to his clinic visit showed aortic valve stenosis as mentioned above.  This has significantly progressed since 2016 when his aortic stenosis was described as mild.  Despite the severe aortic, he did not have any symptoms.      As part of his workup, he underwent TAVR CT.  Multiple pulmonary nodules as well as renal cysts were found, and he evaluated by Dr. Morris from [facility].  I am very grateful for Dr. Morris's input, and he feels that these lesions are benign.      His workup has been deferred for the time being due to the current coronavirus pandemic.  The patient tells me that  for the past few weeks he has been getting a little bit more short of breath, but this became much worse 2 days ago.  He does admit to having a diet, which consists of digoxin hamburgers quite often.  He does not use added salt.  He has noticed mild weight gain, as well as increasing ankle edema, together with orthopnea.  He denies chest pains.  Due to the severity of the symptoms, he presented to the hospital and has been admitted.  With diuresis,  he is feeling better.     Past Medical History   I have reviewed this patient's medical history and updated it with pertinent information if needed.   Past Medical History:   Diagnosis Date     Allergic rhinitis, cause unspecified      Anemia, unspecified      Aortic stenosis, mild      Benign neoplasm of colon 1999    Ademonatous polyp     CKD (chronic kidney disease) stage 3, GFR 30-59 ml/min (H) 5/12/2011     Coronary atherosclerosis of unspecified type of vessel, native or graft     s/p CABG 2002     Hyperlipidemia LDL goal < 100      Hypertension goal BP (blood pressure) < 140/90      Localized osteoarthrosis not specified whether primary or secondary, lower leg     left knee     Moderate persistent asthma      Unspecified hearing loss        Past Surgical History   I have reviewed this patient's surgical history and updated it with pertinent information if needed.  Past Surgical History:   Procedure Laterality Date     C NONSPECIFIC PROCEDURE  5/02    Coronary artery bypass     CARDIAC SURGERY       HC COLONOSCOPY THRU STOMA W BIOPSY/CAUTERY TUMOR/POLYP/LESION  5/03    Dr. Dow, Q 5 years     HC COLONOSCOPY THRU STOMA W BIOPSY/CAUTERY TUMOR/POLYP/LESION  12/199    Dr. Dow, one adenomatous polyp     HC ESOPHAGOSCOPY, DIAGNOSTIC  5/03    Dr. Dow, lower esophageal ring & mild gastritis     HERNIORRHAPHY INGUINAL Right 9/26/2016    Procedure: HERNIORRHAPHY INGUINAL;  Surgeon: Alexis Alexandra MD;  Location: [facility]     LAPAROSCOPIC CHOLECYSTECTOMY  12/03     for biliary sludge       Prior to Admission Medications   Prior to Admission Medications   Prescriptions Last Dose Informant Patient Reported? Taking?   Multiple Vitamins-Minerals (MULTIVITAMIN PO) 4/22/2020 at Unknown time Self Yes Yes   Sig: Take 1 tablet by mouth daily   albuterol (PROAIR HFA) 108 (90 Base) MCG/ACT inhaler   No No   Sig: INHALE 1 TO 2 PUFFS EVERY 4 TO 6 HOURS AS NEEDED   budesonide (PULMICORT) 0.5 MG/2ML nebulizer solution 4/22/2020 at Unknown time Self No Yes   Sig: Take 2 mLs (0.5 mg) by nebulization 2 times daily   calcium carbonate 600 mg-vitamin D 400 units (CALTRATE) 600-400 MG-UNIT per tablet 4/22/2020 at Unknown time  Yes Yes   Sig: Take 1 tablet by mouth daily   ferrous sulfate (IRON) 325 (65 Fe) MG tablet 4/22/2020 at am  No Yes   Sig: TAKE 1 TABLET(325 MG) BY MOUTH TWICE DAILY   ipratropium - albuterol 0.5 mg/2.5 mg/3 mL (DUONEB) 0.5-2.5 (3) MG/3ML nebulization 4/22/2020 at Unknown time Self No Yes   Sig: Inhale 1 vial (3 mLs) into the lungs 4 times daily   lisinopril (PRINIVIL/ZESTRIL) 20 MG tablet 4/22/2020 at Unknown time  No Yes   Sig: Take 1 tablet (20 mg) by mouth daily   metoprolol tartrate (LOPRESSOR) 50 MG tablet 4/22/2020 at Unknown time  No Yes   Sig: TAKE 1/2 TABLET(25 MG) BY MOUTH TWICE DAILY   montelukast (SINGULAIR) 10 MG tablet 4/22/2020 at am  No Yes   Sig: TAKE 1 TABLET(10 MG) BY MOUTH DAILY   rivaroxaban ANTICOAGULANT (XARELTO ANTICOAGULANT) 20 MG TABS tablet 4/22/2020 at pm  No Yes   Sig: Take 1 tablet (20 mg) by mouth daily (with dinner)   simvastatin (ZOCOR) 40 MG tablet 4/22/2020 at Unknown time  No Yes   Sig: Take 1 tablet (40 mg) by mouth At Bedtime      Facility-Administered Medications: None     Allergies   Allergies   Allergen Reactions     No Known Drug Allergies        Social History   I have reviewed this patient's social history and updated it with pertinent information if needed. Shiraz Ingram  reports that he has quit smoking. His smoking use included  cigarettes. He has never used smokeless tobacco. He reports that he does not drink alcohol or use drugs.    Family History   I have reviewed this patient's family history and updated it with pertinent information if needed.   Family History   Problem Relation Age of Onset     C.A.D. Father      Cancer Mother         breast cancer,  of pneumonia     Cerebrovascular Disease Son      CABG Son      Family History Negative Child      Family History Negative Sister      Heart Disease Brother        Review of Systems   The 10 point Review of Systems is negative other than noted in the HPI or here.     Physical Exam   Temp: 97.9  F (36.6  C) Temp src: Oral BP: (!) 156/83 Pulse: 82 Heart Rate: 84 Resp: 20 SpO2: 94 % O2 Device: Nasal cannula Oxygen Delivery: 2 LPM(Pt SOB, requested 2L again)  Vital Signs with Ranges  Temp:  [97.8  F (36.6  C)-98.4  F (36.9  C)] 97.9  F (36.6  C)  Pulse:  [61-82] 82  Heart Rate:  [78-84] 84  Resp:  [20-24] 20  BP: (128-182)/() 156/83  SpO2:  [89 %-99 %] 94 %  169 lbs 8 oz     GENERAL:  A very pleasant gentleman who looks younger than his stated age, and he is in no acute distress.   VITAL SIGNS:  Blood pressure 156/83.  He is afebrile.   HEENT:  Examination unremarkable.  Mucous membranes appear normal.     SKIN:  He has no clubbing, no peripheral or central cyanosis.   NECK:  No raised JVP and no thyromegaly.   CARDIAC:  Cardiac apex is not palpable.  Well-healed median sternotomy scar.  Auscultation reveals a 3/6 ejection systolic murmur, left sternal border, with radiation to the carotids.   CHEST:  Symmetrical expansion without the use of accessory muscles.  Breath sounds are normal.   ABDOMEN:  Soft and nontender.  No hepatosplenomegaly.  The abdominal aorta is not palpable.   EXTREMITIES:  1+ bilateral ankle edema.  Foot pulses are mildly diminished.   CENTRAL NERVOUS SYSTEM:  Grossly intact.   PSYCHIATRIC:  Mood appears normal.      LABORATORY INVESTIGATIONS:  NT-proBNP 8790.   Troponins 0.018, 0.017, 0.015.  Creatinine 1.44, GFR of 44.  Electrolytes within normal limits.  INR 2.35.   Hemoglobin 12.3, white cell 5, platelet count of 103.      Chest x-ray is reported as showing interstitial pulmonary edema and tiny bilateral pleural effusions.        Data   Results for orders placed or performed during the hospital encounter of 04/23/20 (from the past 24 hour(s))   POC US CHEST B-SCAN    Impression    [facility] Procedure Note      Limited Bedside ED Ultrasound of Thorax:    PROCEDURE: PERFORMED BY: Dr. Shiraz Poe MD  INDICATIONS/SYMPTOM:  shortness of breath  PROBE: High frequency linear probe  BODY LOCATION: Chest  FINDINGS:  Images of both lung hemithoracies taken in 2D in multiple rib spaces        Right side:  Lung sliding artifact  Present     Comet tail artifacts  Present   Left side:  Lung sliding artifact  Present     Comet tail artifacts  Present   Hemothorax: Right side Absent     Left side Absent   Pleural effusion: Right side Present      Left side Present    INTERPRETATION: There is evidence of free fluid consistent with a Bilateral pleural effusion. B-lines present bilaterally, primarily in anterior lung fields.  IMAGE DOCUMENTATION: Images were archived to PACs system.       CBC with platelets differential   Result Value Ref Range    WBC 5.0 4.0 - 11.0 10e9/L    RBC Count 5.87 4.4 - 5.9 10e12/L    Hemoglobin 12.3 (L) 13.3 - 17.7 g/dL    Hematocrit 38.5 (L) 40.0 - 53.0 %    MCV 66 (L) 78 - 100 fl    MCH 21.0 (L) 26.5 - 33.0 pg    MCHC 31.9 31.5 - 36.5 g/dL    RDW 17.4 (H) 10.0 - 15.0 %    Platelet Count 103 (L) 150 - 450 10e9/L    Diff Method Automated Method     % Neutrophils 73.6 %    % Lymphocytes 13.2 %    % Monocytes 7.8 %    % Eosinophils 4.8 %    % Basophils 0.4 %    % Immature Granulocytes 0.2 %    Nucleated RBCs 0 0 /100    Absolute Neutrophil 3.7 1.6 - 8.3 10e9/L    Absolute Lymphocytes 0.7 (L) 0.8 - 5.3 10e9/L    Absolute Monocytes 0.4 0.0 - 1.3  10e9/L    Absolute Eosinophils 0.2 0.0 - 0.7 10e9/L    Absolute Basophils 0.0 0.0 - 0.2 10e9/L    Abs Immature Granulocytes 0.0 0 - 0.4 10e9/L    Absolute Nucleated RBC 0.0    Comprehensive metabolic panel   Result Value Ref Range    Sodium 140 133 - 144 mmol/L    Potassium 4.3 3.4 - 5.3 mmol/L    Chloride 108 94 - 109 mmol/L    Carbon Dioxide 26 20 - 32 mmol/L    Anion Gap 6 3 - 14 mmol/L    Glucose 124 (H) 70 - 99 mg/dL    Urea Nitrogen 28 7 - 30 mg/dL    Creatinine 1.44 (H) 0.66 - 1.25 mg/dL    GFR Estimate 44 (L) >60 mL/min/[1.73_m2]    GFR Estimate If Black 51 (L) >60 mL/min/[1.73_m2]    Calcium 9.1 8.5 - 10.1 mg/dL    Bilirubin Total 1.0 0.2 - 1.3 mg/dL    Albumin 3.8 3.4 - 5.0 g/dL    Protein Total 7.9 6.8 - 8.8 g/dL    Alkaline Phosphatase 70 40 - 150 U/L    ALT 32 0 - 70 U/L    AST 27 0 - 45 U/L   INR   Result Value Ref Range    INR 2.35 (H) 0.86 - 1.14   Troponin I   Result Value Ref Range    Troponin I ES 0.018 0.000 - 0.045 ug/L   Blood gas venous and oxyhgb   Result Value Ref Range    Ph Venous 7.36 7.32 - 7.43 pH    PCO2 Venous 48 40 - 50 mm Hg    PO2 Venous 38 25 - 47 mm Hg    Bicarbonate Venous 27 21 - 28 mmol/L    FIO2 2L     Oxyhemoglobin Venous 61 %    Base Excess Venous 0.7 mmol/L   BNP   Result Value Ref Range    N-Terminal Pro BNP Inpatient 8,790 (H) 0 - 1,800 pg/mL   COVID-19 Virus (Coronavirus) by PCR Nasopharyngeal    Specimen: Nasopharyngeal   Result Value Ref Range    COVID-19 Virus PCR to [facility] - Source Nasopharyngeal     COVID-19 Virus PCR to [facility] - Result       Test received-See reflex to IDDL test SARS CoV2 (COVID-19) Virus RT-PCR   SARS-CoV-2 COVID-19 Virus (Coronavirus) RT-PCR Nasopharyngeal    Specimen: Nasopharyngeal   Result Value Ref Range    SARS-CoV-2 Virus Specimen Source Nasopharyngeal     SARS-CoV-2 PCR Result NEGATIVE     SARS-CoV-2 PCR Comment       The Simplexa COVID-19 direct PCR assay by [employer] on the Sp[employer] Medical instrument has been   given Emergency Use  Authorization (EUA) for the in vitro qualitative detection of RNA from   the SARS-CoV2 virus in nasopharyngeal swabs in viral transport medium from patients with   signs and symptoms of infection who are suspected of COVID-19. Performance is unknown in   asymptomatic patients.     EKG 12-lead, tracing only   Result Value Ref Range    Interpretation ECG Click View Image link to view waveform and result    XR Chest Port 1 View    Narrative    EXAM: CHEST SINGLE VIEW PORTABLE  LOCATION: [facility]  DATE/TIME: 2020 3:32 AM    INDICATION: Shortness of breath.  COMPARISON: 2019.    FINDINGS: Mildly increased interstitial opacities in both lungs. Blunting of bilateral costophrenic angles. Possible mild cardiomegaly. Atherosclerotic calcification in the thoracic aorta. Prior median sternotomy. Mild elevation of the left   hemidiaphragm.      Impression    IMPRESSION:   1.  Mildly increased interstitial opacities in the lungs, most likely relating to interstitial pulmonary edema.  2.  Blunting of bilateral costophrenic angles that could relate to tiny bilateral pleural effusions or scarring.    Troponin I   Result Value Ref Range    Troponin I ES 0.017 0.000 - 0.045 ug/L   Troponin I   Result Value Ref Range    Troponin I ES 0.015 0.000 - 0.045 ug/L                 SANDRA ASTUDILLO MD, [facility]             D: 2020   T: 2020   MT: OMAR      Name:     SRIKANTH OBANDO   MRN:      [id number]        Account:       [id number]   :      1934           Consult Date:  2020      Document: [id number]

## 2020-04-23 NOTE — H&P
[facility]    History and Physical  Hospitalist       Date of Admission:  4/23/2020  Date of Service (when I saw the patient): 04/23/20    Assessment & Plan   Shiraz Ingram is a 85 year old male who presents with shortness of breath.  Admitted for further evaluation and treatment.    Acute hypoxic respiratory failure, secondary to severe aortic valve calcification and reactive airway disease: Patient was apparently scheduled for TAVR but was postponed due to COVID-19.  Previous echocardiogram was completed in January 2020 with severe aortic stenosis present and an ejection fraction of 55 to 60%.  Troponin is elevated at 0.018 with a BNP level of 8790.  Liver function testing is normal range.  Venous blood gas shows a pH of 7.36 with a CO2 level of 48 and an oxygen level of 38 with a bicarbonate of 27.  INR on admission is 2.35.  Portable chest x-ray shows mildly increased interstitial opacities in the lungs, most likely related to interstitial pulmonary edema, blunting of bilateral costophrenic angles that could relate to tiny bilateral pleural effusions or scarring, see report for further details.  EKG on admission shows atrial fibrillation with right bundle branch block.  Per report patient with increasing shortness of breath endorses difficulty sleeping at night due to breathing.  Patient states that he is uses inhalers frequently with periodic slight improvement.  Patient states that he also has increased edema in bilateral lower extremities.  Patient denies fever, cough, chest pain, abdominal pain, nausea, vomiting, dysuria, blood in stool or urine, rash, headache, sore throat, fever, eye pain, ear pain.  Patient does endorse bilateral lower extremity edema.  -Pulmonary hygiene.  -Steroid taper starting at 60 mg prednisone twice daily.  -Cardiology consulted for aortic valve assessment.  -Wean oxygen as able.  -Close hemodynamic monitoring.  -Lasix 60 mg twice daily IV.  -COVID-19  pending.  -Isolation precautions.  -Serial troponins.    Cardiac, hypertension, atrial fibrillation, hyperlipidemia: Patient chronically anticoagulated on Xarelto.  Patient also on lisinopril, metoprolol, and simvastatin as an outpatient.  Last echocardiogram was completed in January 2014 with severe aortic stenosis and ejection fraction of 55 to 60%.  See report for further details.  History of coronary artery bypass grafting in 2002.  Patient with history of hyperlipidemia and hypertension.  -Continue prior to admission lisinopril when verified by pharmacy  -Continue prior to admission metoprolol when verified by pharmacy.  -Continue prior to admission Xarelto when verified by pharmacy.  -Continue prior to admission simvastatin when verified by pharmacy.    Reactive airway disease, asthma: Patient maintained on albuterol, budesonide, Singulair, duo nebs prior to admission.  -Continue prior to admission albuterol inhaler  -Continue prior to admission Singulair    Chronic kidney disease, stage III: Patient with a creatinine of 1.44 on admission.  -Avoid nephrotoxins.  -Monitor.    Anemia, thrombocytopenia: Patient with hemoglobin of 12.3 on admission with a platelet count of 103.  MCV is low at 66.  No report of external blood loss.  -Monitor.    DVT Prophylaxis: Xarelto when verified by pharmacy  Code Status: Full Code    Disposition: Inpatient.    Dr. Isaac Infante D.O.  [facility] Hospitalist  Pager [phone]    Primary Care Physician   Dany Rodriguez MD    Chief Complaint   Shortness of breath    History is obtained from the patient and medical records.     History of Present Illness   Shiraz Ingram is a 85 year old male who presents with shortness of breath.  Admitted for further evaluation and treatment. Patient was apparently scheduled for TAVR but was postponed due to COVID-19.  Previous echocardiogram was completed in January 2020 with severe aortic stenosis present and an ejection  fraction of 55 to 60%.  Troponin is elevated at 0.018 with a BNP level of 8790.  Liver function testing is normal range.  Venous blood gas shows a pH of 7.36 with a CO2 level of 48 and an oxygen level of 38 with a bicarbonate of 27.  INR on admission is 2.35.  Portable chest x-ray shows mildly increased interstitial opacities in the lungs, most likely related to interstitial pulmonary edema, blunting of bilateral costophrenic angles that could relate to tiny bilateral pleural effusions or scarring, see report for further details.  EKG on admission shows atrial fibrillation with right bundle branch block.  Per report patient with increasing shortness of breath endorses difficulty sleeping at night due to breathing.  Patient states that he is uses inhalers frequently with periodic slight improvement.  Patient states that he also has increased edema in bilateral lower extremities.  Patient denies fever, cough, chest pain, abdominal pain, nausea, vomiting, dysuria, blood in stool or urine, rash, headache, sore throat, fever, eye pain, ear pain.  Patient does endorse bilateral lower extremity edema.    Past Medical History    I have reviewed this patient's medical history and updated it with pertinent information if needed.   Past Medical History:   Diagnosis Date     Allergic rhinitis, cause unspecified      Anemia, unspecified      Aortic stenosis, mild      Benign neoplasm of colon 1999    Ademonatous polyp     CKD (chronic kidney disease) stage 3, GFR 30-59 ml/min (H) 5/12/2011     Coronary atherosclerosis of unspecified type of vessel, native or graft     s/p CABG 2002     Hyperlipidemia LDL goal < 100      Hypertension goal BP (blood pressure) < 140/90      Localized osteoarthrosis not specified whether primary or secondary, lower leg     left knee     Moderate persistent asthma      Unspecified hearing loss        Past Surgical History   I have reviewed this patient's surgical history and updated it with pertinent  information if needed.  Past Surgical History:   Procedure Laterality Date     C NONSPECIFIC PROCEDURE  5/02    Coronary artery bypass     CARDIAC SURGERY       HC COLONOSCOPY THRU STOMA W BIOPSY/CAUTERY TUMOR/POLYP/LESION  5/03    Dr. Dow, Q 5 years     HC COLONOSCOPY THRU STOMA W BIOPSY/CAUTERY TUMOR/POLYP/LESION  12/199    Dr. Dow, one adenomatous polyp     HC ESOPHAGOSCOPY, DIAGNOSTIC  5/03    Dr. Dow, lower esophageal ring & mild gastritis     HERNIORRHAPHY INGUINAL Right 9/26/2016    Procedure: HERNIORRHAPHY INGUINAL;  Surgeon: Alexis Alexandra MD;  Location: [facility]     LAPAROSCOPIC CHOLECYSTECTOMY  12/03    for biliary sludge       Prior to Admission Medications   Prior to Admission Medications   Prescriptions Last Dose Informant Patient Reported? Taking?   Multiple Vitamins-Minerals (MULTIVITAMIN PO)  Self Yes No   Sig: Take 1 tablet by mouth daily   albuterol (PROAIR HFA) 108 (90 Base) MCG/ACT inhaler   No No   Sig: INHALE 1 TO 2 PUFFS EVERY 4 TO 6 HOURS AS NEEDED   budesonide (PULMICORT) 0.5 MG/2ML nebulizer solution  Self No No   Sig: Take 2 mLs (0.5 mg) by nebulization 2 times daily   ferrous sulfate (IRON) 325 (65 Fe) MG tablet   No No   Sig: TAKE 1 TABLET(325 MG) BY MOUTH TWICE DAILY   ipratropium - albuterol 0.5 mg/2.5 mg/3 mL (DUONEB) 0.5-2.5 (3) MG/3ML nebulization  Self No No   Sig: Inhale 1 vial (3 mLs) into the lungs 4 times daily   lisinopril (PRINIVIL/ZESTRIL) 20 MG tablet   No No   Sig: Take 1 tablet (20 mg) by mouth daily   metoprolol tartrate (LOPRESSOR) 50 MG tablet   No No   Sig: TAKE 1/2 TABLET(25 MG) BY MOUTH TWICE DAILY   montelukast (SINGULAIR) 10 MG tablet   No No   Sig: TAKE 1 TABLET(10 MG) BY MOUTH DAILY   rivaroxaban ANTICOAGULANT (XARELTO ANTICOAGULANT) 20 MG TABS tablet   No No   Sig: Take 1 tablet (20 mg) by mouth daily (with dinner)   simvastatin (ZOCOR) 40 MG tablet   No No   Sig: Take 1 tablet (40 mg) by mouth At Bedtime      Facility-Administered Medications:  None     Allergies   Allergies   Allergen Reactions     No Known Drug Allergies        Social History   I have reviewed this patient's social history and updated it with pertinent information if needed. Shiraz Ingram  reports that he has quit smoking. His smoking use included cigarettes. He has never used smokeless tobacco. He reports that he does not drink alcohol or use drugs.    Family History   I have reviewed this patient's family history and updated it with pertinent information if needed.   Family History   Problem Relation Age of Onset     C.A.D. Father      Cancer Mother         breast cancer,  of pneumonia     Cerebrovascular Disease Son      CABG Son      Family History Negative Child      Family History Negative Sister      Heart Disease Brother        Review of Systems   The 10 point Review of Systems is negative other than noted in the HPI or here.     Physical Exam   Temp: 97.8  F (36.6  C) Temp src: Oral BP: (!) 128/91 Pulse: 77 Heart Rate: 78 Resp: 24 SpO2: 98 % O2 Device: Nasal cannula Oxygen Delivery: 2 LPM  Vital Signs with Ranges  Temp:  [97.8  F (36.6  C)] 97.8  F (36.6  C)  Pulse:  [61-80] 77  Heart Rate:  [78] 78  Resp:  [24] 24  BP: (128-182)/() 128/91  SpO2:  [92 %-99 %] 98 %  170 lbs 0 oz    GENERAL: Alert and oriented. NAD. Conversational, appropriate. Hard of hearing.   HEENT: Normocephalic. EOMI. No icterus or injection. Nares normal.   LUNGS: Coarse with decrement in bilateral bases, wheezing, crackles. No dyspnea at rest.   HEART: Irregular rate, systolic murmur. Extremities perfused.   ABDOMEN: Soft, nontender, and nondistended. Positive bowel sounds.   EXTREMITIES: Bilateral LE edema noted.   NEUROLOGIC: Moves extremities x4 on command. No acute focal neurologic abnormalities noted.     Data   Data reviewed today:  I personally reviewed both laboratory and imaging data.   Recent Labs   Lab 20  0325   WBC 5.0   HGB 12.3*   MCV 66*   *   INR 2.35*       POTASSIUM 4.3   CHLORIDE 108   CO2 26   BUN 28   CR 1.44*   ANIONGAP 6   AYALA 9.1   *   ALBUMIN 3.8   PROTTOTAL 7.9   BILITOTAL 1.0   ALKPHOS 70   ALT 32   AST 27   TROPI 0.018       Recent Results (from the past 24 hour(s))   XR Chest Port 1 View    Narrative    EXAM: CHEST SINGLE VIEW PORTABLE  LOCATION: [facility]  DATE/TIME: 4/23/2020 3:32 AM    INDICATION: Shortness of breath.  COMPARISON: 8/12/2019.    FINDINGS: Mildly increased interstitial opacities in both lungs. Blunting of bilateral costophrenic angles. Possible mild cardiomegaly. Atherosclerotic calcification in the thoracic aorta. Prior median sternotomy. Mild elevation of the left   hemidiaphragm.      Impression    IMPRESSION:   1.  Mildly increased interstitial opacities in the lungs, most likely relating to interstitial pulmonary edema.  2.  Blunting of bilateral costophrenic angles that could relate to tiny bilateral pleural effusions or scarring.

## 2020-04-23 NOTE — ED TRIAGE NOTES
Pt has hx of asthma, c/o increased SOB over the last day. Reports he isnt able to sleep at night. Pt denies chest pain. Pt reports increased use of inhalers and nebulizer

## 2020-04-23 NOTE — ED PROVIDER NOTES
History   Chief Complaint:  Shortness of Breath     HPI   Shiraz Ingram is an anticoagulated 85 year old male with a history of asthma, coronary atherosclerosis, hypertension, and hyperlipidemia who presents with shortness of breath. The patient reports that over the last day he has developed increasing shortness of breath. He also endorses having difficulty sleeping at night due to breathing difficulties. He has been using his inhalers frequently with brief improvement in breathing. The patient further attests to increased swelling in his legs. He denies fever, cough, chest pain, and abdominal pain. The patient has a history of aortic valve stenosis and was scheduled for a TAVR, however this has been cancelled due to the COVID-19 pandemic.    Allergies:  No known drug allergies    Medications:   Albuterol inhaler  Pulmicort  Duoneb  Lisinopril  Metoprolol tartrate  Singulair  Xarelto  Simvastatin    Past Medical History:    Allergic rhinitis  Benign neoplasm of colon   CKD (chronic kidney disease) stage 3   Hyperlipidemia    Hypertension   Unspecified hearing loss   Mild persistent asthma without complication  Iron deficiency anemia  Vitamin B 12 deficiency  Nonrheumatic aortic valve stenosis  Osteoporosis  Coronary atherosclerosis  Benign neoplasm of colon  Localized osteoarthrosis, lower leg    Past Surgical History:    CABG  Laparoscopic cholecystectomy  Colonoscopy with biopsy/cautery tumor/polyp/lesion x2  Herniorrhaphy inguinal, right    Family History:    CAD  Breast cancer  Cerebrovascular disease  CABG    Social History:  Smoking status- former smoker  Alcohol use- no  Drug use- no    Review of Systems   Constitutional: Negative for fever.   Respiratory: Positive for shortness of breath. Negative for cough.    Cardiovascular: Positive for leg swelling (bilateral). Negative for chest pain.   Gastrointestinal: Negative for abdominal pain.   Psychiatric/Behavioral: Positive for sleep disturbance.   All other  "systems reviewed and are negative.        Physical Exam     Patient Vitals for the past 24 hrs:   BP Temp Temp src Pulse Heart Rate Resp SpO2 Height Weight   04/23/20 0456 -- -- -- -- -- -- 96 % -- --   04/23/20 0455 -- -- -- -- -- -- 94 % -- --   04/23/20 0454 -- -- -- -- -- -- (!) 89 % -- --   04/23/20 0415 (!) 128/91 -- -- 77 -- -- 98 % -- --   04/23/20 0409 -- -- -- -- -- -- 98 % -- --   04/23/20 0400 (!) 132/108 -- -- 61 -- -- 98 % -- --   04/23/20 0345 (!) 170/104 -- -- 76 -- -- 98 % -- --   04/23/20 0331 -- -- -- -- -- -- 97 % -- --   04/23/20 0330 (!) 175/92 -- -- 80 -- -- 99 % -- --   04/23/20 0317 -- -- -- -- -- -- 95 % -- --   04/23/20 0316 (!) 182/98 -- -- 71 -- -- 92 % -- --   04/23/20 0314 (!) 182/98 97.8  F (36.6  C) Oral -- 78 24 92 % 1.727 m (5' 8\") 77.1 kg (170 lb)     Physical Exam  General: Appears well-developed and well-nourished.   Head: No signs of trauma.   Mouth/Throat: Oropharynx is clear and moist.   CV: Normal rate and regular rhythm.    Resp: Effort normal and breath sounds normal. No respiratory distress.   GI: Soft. There is no tenderness.  No rebound or guarding.  Normal bowel sounds.    MSK: Normal range of motion. mild edema. No Calf tenderness.  Neuro: The patient is alert and oriented.  PERRLA, EOMI, strength in upper/lower extremities normal and symmetrical. Sensation normal. Pt is deaf.  Skin: Skin is warm and dry. No rash noted.   Psych: normal mood and affect. behavior is normal.       Emergency Department Course   ECG (03:27:18):  Rate 71 bpm. LA interval *. QRS duration 148. QT/QTc 458/497. P-R-T axes * -62 22. Atrial fibrillation. RBBB. Left anterior fascicular block. Bifascicular block. Inferior infarct, age undetermined. No significant change compared to prior EKG dated 09/26/2015. Interpreted at 0330 by Shiraz Poe MD.    Imaging:  Radiographic findings were communicated with the patient who voiced understanding of the findings.    XR Chest Port 1 View   Final " Result   IMPRESSION:    1.  Mildly increased interstitial opacities in the lungs, most likely relating to interstitial pulmonary edema.   2.  Blunting of bilateral costophrenic angles that could relate to tiny bilateral pleural effusions or scarring.       POC US CHEST B-SCAN    (Results Pending)       Laboratory:  Laboratory findings were communicated with the patient who voiced understanding of the findings.    CBC: HGB 12.3 (L),  (L) o/w WNL (WBC 5.0)  CMP: Glucose 124 (H), Creatinine 1.44 (H), GFR Estimate 44 (L) o/w WNL  Troponin: 0.018  BNP: 8,790 (H)  INR: 2.35 (H)  Blood gas venous and oxyhgb: pH 7.36, pCO2 48, pO2 38, Bicarbonate 27, FIO2 2 L, Oxyhemoglobin 61, Base Excess 0.7    COVID-19 virus by PCR: pending    Interventions:  0331 prednisone 60 mg PO  0447 Lasix 40 mg IV    Emergency Department Course:  Past medical records, nursing notes, and vitals reviewed.   0325 I performed an exam of the patient and obtained history, as documented above. Bedside ultrasound performed.     IV inserted and blood drawn. Patient placed on supplemental oxygen via nasal canula at 2 LPM.   EKG obtained, results above.   A chest x-ray was obtained, results above.    0410 I rechecked the patient. Explained findings to the patient.    0433 I spoke to Dr. Infante of the hospitalist service who accepts the patient for admission.     Findings and plan explained to the patient who consents to admission. Discussed the patient with Dr. Infante, who will admit the patient to a medical bed for further monitoring, evaluation, and treatment.    Impression & Plan    Medical Decision Making:  Shiraz Ingram is an 85-year-old gentleman who presents due to shortness of breath.  His shortness of breath has become worse over the last couple of days making it difficult for him to sleep.  He has also noticed some edema to the lower extremities.  Patient does have a history of cardiac valvular problems and was scheduled for TAVR, but  unfortunately this was canceled secondary to COVID.  On my evaluation, he did not appear in significant distress.  His O2 sats were in the low 90s with room air.  With ambulation, he would desat to the 80s.  I did do a bedside ultrasound which showed some B-lines and chest x-ray showed concerns for vascular congestion.  Patient's BNP was also elevated.  I had initially given the patient prednisone for concerns for reactive airway disease as he felt that his symptoms had somewhat improved with his nebulizers.  After the work-up was completed, he was given dose of Lasix for what appears to be CHF.  Given the patient's age and report of shortness of breath, I did send a COVID test.  Patient will be admitted to the hospitalist service.    Covid-19  Shiraz Ingram was evaluated during a global COVID-19 pandemic, which necessitated consideration that the patient might be at risk for infection with the SARS-CoV-2 virus that causes COVID-19.   Applicable protocols for evaluation were followed during the patient's care.    Diagnosis:  No diagnosis found.     Disposition:  Admitted to medicine.        4/23/2020   Shiraz Moreland, am serving as a scribe at 3:07 AM on 4/23/2020 to document services personally performed by Shiraz Poe MD based on my observations and the provider's statements to me.      Shiraz Poe MD  04/23/20 [id number]

## 2020-04-23 NOTE — PLAN OF CARE
RECEIVING UNIT ED HANDOFF REVIEW    ED Nurse Handoff Report was reviewed by: Nestor Adrian RN on April 23, 2020 at 5:19 AM

## 2020-04-23 NOTE — PROGRESS NOTES
Patient COVID negative. Tyler ZARATE, [place]. Removed from [facility] and orders to transfer to heart center. Report called to bedside JOSE Tidwell.

## 2020-04-23 NOTE — ED NOTES
[facility]  ED Nurse Handoff Report    ED Chief complaint: Shortness of Breath      ED Diagnosis:   Final diagnoses:   None       Code Status: Full Code    Allergies:   Allergies   Allergen Reactions     No Known Drug Allergies        Patient Story: Pt has hx of asthma, c/o increased SOB over the last day. Reports he isnt able to sleep at night. Pt denies chest pain. Pt reports increased use of inhalers and nebulizer. Pt reports he was supposed to have heart surgery in february and wasn't able to due to the pandemic. Pt is hard of hearing and uses a . Pt denies cough or fever. covid test done in ER and is pending  Focused Assessment:  Shortness of breath    Treatments and/or interventions provided: prednisone, O2 at 2liters per nc  Patient's response to treatments and/or interventions:     To be done/followed up on inpatient unit:      Does this patient have any cognitive concerns?: alert and oriented    Activity level - Baseline/Home:  Independent  Activity Level - Current:   Stand with Assist    Patient's Preferred language: American Sign Language   Needed?: Yes,     Isolation: None and Other: covid pending     Infection: covid pending  Other   Bariatric?: No    Vital Signs:   Vitals:    04/23/20 0331 04/23/20 0345 04/23/20 0400 04/23/20 0409   BP:  (!) 170/104 (!) 132/108    Pulse:  76 61    Resp:       Temp:       TempSrc:       SpO2: 97% 98% 98% 98%   Weight:       Height:           Cardiac Rhythm:     Was the PSS-3 completed:   Yes  What interventions are required if any?               Family Comments: call his son Nicko when he is being discharged  OBS brochure/video discussed/provided to patient/family: Yes              Name of person given brochure if not patient:               Relationship to patient:     For the majority of the shift this patient's behavior was Green.   Behavioral interventions performed were .    ED NURSE PHONE  NUMBER: [phone]

## 2020-04-23 NOTE — PLAN OF CARE
PT: Order received; Per chart review and conversation with nurse patient to undergo diuresis today, ALINE and possible Angio tomorrow/further Cardiology workup; Attempt next on 4/25.

## 2020-04-23 NOTE — PLAN OF CARE
A&O. Hearing aid to rt ear. Jabber at bedside. BP elevated otherwise vss on 2L. Up with 1 assist. BLE edema noted. Cards to consult today. Will continue to diurese. COVID pending.

## 2020-04-23 NOTE — ED NOTES
Pt ambulated in room without O2 on. Pt reports feeling short of breath and o2 drops to 89% on room air

## 2020-04-23 NOTE — PHARMACY-ADMISSION MEDICATION HISTORY
Pharmacy Medication History  Admission medication history interview status for the 4/23/2020  admission is complete. See EPIC admission navigator for prior to admission medications     Medication history sources: Surescripts, Care Everywhere and RN interview with patient via   Medication history source reliability: Good  Adherence assessment: Good    Significant changes made to the medication list:  Ferrous sulfate is only taken once daily instead of twice daily as prescribed      Additional medication history information:   NA    Medication reconciliation completed by provider prior to medication history? No    Time spent in this activity: 30 minutes      Prior to Admission medications    Medication Sig Last Dose Taking? Auth Provider   budesonide (PULMICORT) 0.5 MG/2ML nebulizer solution Take 2 mLs (0.5 mg) by nebulization 2 times daily 4/22/2020 at Unknown time Yes Dany Rodriguez MD   calcium carbonate 600 mg-vitamin D 400 units (CALTRATE) 600-400 MG-UNIT per tablet Take 1 tablet by mouth daily 4/22/2020 at Unknown time Yes Unknown, Entered By History   ferrous sulfate (IRON) 325 (65 Fe) MG tablet TAKE 1 TABLET(325 MG) BY MOUTH TWICE DAILY 4/22/2020 at am Yes Dany Rodriguez MD   ipratropium - albuterol 0.5 mg/2.5 mg/3 mL (DUONEB) 0.5-2.5 (3) MG/3ML nebulization Inhale 1 vial (3 mLs) into the lungs 4 times daily 4/22/2020 at Unknown time Yes Dany Rodriguez MD   lisinopril (PRINIVIL/ZESTRIL) 20 MG tablet Take 1 tablet (20 mg) by mouth daily 4/22/2020 at Unknown time Yes Dany Rodriguez MD   metoprolol tartrate (LOPRESSOR) 50 MG tablet TAKE 1/2 TABLET(25 MG) BY MOUTH TWICE DAILY 4/22/2020 at Unknown time Yes Dany Rodriguez MD   montelukast (SINGULAIR) 10 MG tablet TAKE 1 TABLET(10 MG) BY MOUTH DAILY 4/22/2020 at am Yes Dany Rodriguez MD   Multiple Vitamins-Minerals (MULTIVITAMIN PO) Take 1 tablet by mouth daily 4/22/2020 at  Unknown time Yes Unknown, Entered By History   rivaroxaban ANTICOAGULANT (XARELTO ANTICOAGULANT) 20 MG TABS tablet Take 1 tablet (20 mg) by mouth daily (with dinner) 4/22/2020 at pm Yes Emir, Jaxon Artis MD   simvastatin (ZOCOR) 40 MG tablet Take 1 tablet (40 mg) by mouth At Bedtime 4/22/2020 at Unknown time Yes Dany Rodriguez MD   albuterol (PROAIR HFA) 108 (90 Base) MCG/ACT inhaler INHALE 1 TO 2 PUFFS EVERY 4 TO 6 HOURS AS NEEDED   Dany Rodriguez MD

## 2020-04-23 NOTE — PROGRESS NOTES
Patient care was assumed this morning, seen and examined.    Shiraz Ingram is a 85-year-old male past medical history significant for CKD stage III, COPD, coronary artery disease status post CABG in 2002, hyperlipidemia, essential hypertension, fibrillation on anticoagulation with Xarelto severe aortic valve calcification post TAVR was postponed due to COVID-19.  Now presented to emergency room with acute hypoxic respiratory failure which was thought to be secondary to combination of congestive heart failure from his valvular disease and possible COPD flareup.  Patient was admitted for further evaluation and management, COVID-19 infection was ruled out.    At this time, will transfer patient to cardiac floor and will continue to monitor patient on telemetry, daily weights are ordered I will also add a strict intake and output.  Continue patient on Lasix 60 mg IV twice daily.  Home medications were reviewed and I will also go ahead and order PTA lisinopril 20 mg p.o. daily along with metoprolol tartrate 25 mg p.o. twice daily.  We will also go ahead and start patient on Xarelto 20 mg p.o. daily along with simvastatin 40 mg p.o. daily.  Cardiology has been consulted.  His last echocardiogram was in January 2020, will let cardiology decide if his echocardiogram needs to be repeated.  Patient is also receiving prednisone 60 mg p.o. twice daily for COPD flareup, will continue on the current dose right now we will try to wean him off the prednisone from tomorrow as soon as possible considering concern for fluid overload.  We will let cardiology decide if patient needs to be evaluated by CV surgery during this hospitalization.  Continue to follow patient closely, no charge for today's visit, for further details regarding admission please review history and physical done earlier by my colleague Dr. Infante.  We will continue to follow patient closely.    Bozena Groves MD, FACP.  [facility]

## 2020-04-24 ENCOUNTER — ANESTHESIA (OUTPATIENT)
Dept: CARDIOLOGY | Facility: CLINIC | Age: 85
DRG: 286 | End: 2020-04-24
Payer: COMMERCIAL

## 2020-04-24 ENCOUNTER — APPOINTMENT (OUTPATIENT)
Dept: CARDIOLOGY | Facility: CLINIC | Age: 85
DRG: 286 | End: 2020-04-24
Attending: INTERNAL MEDICINE
Payer: COMMERCIAL

## 2020-04-24 ENCOUNTER — ANESTHESIA EVENT (OUTPATIENT)
Dept: CARDIOLOGY | Facility: CLINIC | Age: 85
DRG: 286 | End: 2020-04-24
Payer: COMMERCIAL

## 2020-04-24 ENCOUNTER — SURGERY (OUTPATIENT)
Age: 85
End: 2020-04-24
Payer: COMMERCIAL

## 2020-04-24 PROBLEM — I25.10 CORONARY ARTERY DISEASE INVOLVING NATIVE CORONARY ARTERY OF NATIVE HEART WITHOUT ANGINA PECTORIS: Status: ACTIVE | Noted: 2020-04-23

## 2020-04-24 LAB
ANION GAP SERPL CALCULATED.3IONS-SCNC: 2 MMOL/L (ref 3–14)
BUN SERPL-MCNC: 39 MG/DL (ref 7–30)
CALCIUM SERPL-MCNC: 8.9 MG/DL (ref 8.5–10.1)
CHLORIDE SERPL-SCNC: 101 MMOL/L (ref 94–109)
CO2 BLDCOV-SCNC: 33 MMOL/L (ref 21–28)
CO2 SERPL-SCNC: 32 MMOL/L (ref 20–32)
CREAT SERPL-MCNC: 1.74 MG/DL (ref 0.66–1.25)
ERYTHROCYTE [DISTWIDTH] IN BLOOD BY AUTOMATED COUNT: 17 % (ref 10–15)
GFR SERPL CREATININE-BSD FRML MDRD: 35 ML/MIN/{1.73_M2}
GLUCOSE SERPL-MCNC: 128 MG/DL (ref 70–99)
HCT VFR BLD AUTO: 36.1 % (ref 40–53)
HGB BLD-MCNC: 11.3 G/DL (ref 13.3–17.7)
INR PPP: 1.24 (ref 0.86–1.14)
MCH RBC QN AUTO: 20.6 PG (ref 26.5–33)
MCHC RBC AUTO-ENTMCNC: 31.3 G/DL (ref 31.5–36.5)
MCV RBC AUTO: 66 FL (ref 78–100)
PCO2 BLDV: 64 MM HG (ref 40–50)
PH BLDV: 7.32 PH (ref 7.32–7.43)
PLATELET # BLD AUTO: 104 10E9/L (ref 150–450)
PO2 BLDV: 36 MM HG (ref 25–47)
POTASSIUM SERPL-SCNC: 5 MMOL/L (ref 3.4–5.3)
RBC # BLD AUTO: 5.49 10E12/L (ref 4.4–5.9)
SAO2 % BLDV FROM PO2: 62 %
SODIUM SERPL-SCNC: 135 MMOL/L (ref 133–144)
WBC # BLD AUTO: 5.7 10E9/L (ref 4–11)

## 2020-04-24 PROCEDURE — 25000132 ZZH RX MED GY IP 250 OP 250 PS 637: Performed by: INTERNAL MEDICINE

## 2020-04-24 PROCEDURE — 93312 ECHO TRANSESOPHAGEAL: CPT

## 2020-04-24 PROCEDURE — C1769 GUIDE WIRE: HCPCS | Performed by: INTERNAL MEDICINE

## 2020-04-24 PROCEDURE — 93457 R HRT ART/GRFT ANGIO: CPT | Mod: 26 | Performed by: INTERNAL MEDICINE

## 2020-04-24 PROCEDURE — 40000857 ZZH STATISTIC TEE INCLUDES SEDATION

## 2020-04-24 PROCEDURE — 99233 SBSQ HOSP IP/OBS HIGH 50: CPT | Performed by: INTERNAL MEDICINE

## 2020-04-24 PROCEDURE — 21000001 ZZH R&B HEART CARE

## 2020-04-24 PROCEDURE — 4A023N6 MEASUREMENT OF CARDIAC SAMPLING AND PRESSURE, RIGHT HEART, PERCUTANEOUS APPROACH: ICD-10-PCS | Performed by: INTERNAL MEDICINE

## 2020-04-24 PROCEDURE — 25000125 ZZHC RX 250: Performed by: INTERNAL MEDICINE

## 2020-04-24 PROCEDURE — 80048 BASIC METABOLIC PNL TOTAL CA: CPT | Performed by: INTERNAL MEDICINE

## 2020-04-24 PROCEDURE — 27210794 ZZH OR GENERAL SUPPLY STERILE: Performed by: INTERNAL MEDICINE

## 2020-04-24 PROCEDURE — 25000128 H RX IP 250 OP 636: Performed by: INTERNAL MEDICINE

## 2020-04-24 PROCEDURE — 99152 MOD SED SAME PHYS/QHP 5/>YRS: CPT | Mod: 59 | Performed by: INTERNAL MEDICINE

## 2020-04-24 PROCEDURE — B2111ZZ FLUOROSCOPY OF MULTIPLE CORONARY ARTERIES USING LOW OSMOLAR CONTRAST: ICD-10-PCS | Performed by: INTERNAL MEDICINE

## 2020-04-24 PROCEDURE — 94640 AIRWAY INHALATION TREATMENT: CPT | Mod: 76

## 2020-04-24 PROCEDURE — 82803 BLOOD GASES ANY COMBINATION: CPT

## 2020-04-24 PROCEDURE — C1887 CATHETER, GUIDING: HCPCS | Performed by: INTERNAL MEDICINE

## 2020-04-24 PROCEDURE — 99153 MOD SED SAME PHYS/QHP EA: CPT | Performed by: INTERNAL MEDICINE

## 2020-04-24 PROCEDURE — 93457 R HRT ART/GRFT ANGIO: CPT | Performed by: INTERNAL MEDICINE

## 2020-04-24 PROCEDURE — 94640 AIRWAY INHALATION TREATMENT: CPT

## 2020-04-24 PROCEDURE — C1894 INTRO/SHEATH, NON-LASER: HCPCS | Performed by: INTERNAL MEDICINE

## 2020-04-24 PROCEDURE — B2181ZZ FLUOROSCOPY OF LEFT INTERNAL MAMMARY BYPASS GRAFT USING LOW OSMOLAR CONTRAST: ICD-10-PCS | Performed by: INTERNAL MEDICINE

## 2020-04-24 PROCEDURE — 25000131 ZZH RX MED GY IP 250 OP 636 PS 637: Performed by: INTERNAL MEDICINE

## 2020-04-24 PROCEDURE — 99152 MOD SED SAME PHYS/QHP 5/>YRS: CPT | Performed by: INTERNAL MEDICINE

## 2020-04-24 PROCEDURE — 25800030 ZZH RX IP 258 OP 636: Performed by: INTERNAL MEDICINE

## 2020-04-24 PROCEDURE — 40000275 ZZH STATISTIC RCP TIME EA 10 MIN

## 2020-04-24 PROCEDURE — 85610 PROTHROMBIN TIME: CPT | Performed by: INTERNAL MEDICINE

## 2020-04-24 PROCEDURE — B2131ZZ FLUOROSCOPY OF MULTIPLE CORONARY ARTERY BYPASS GRAFTS USING LOW OSMOLAR CONTRAST: ICD-10-PCS | Performed by: INTERNAL MEDICINE

## 2020-04-24 PROCEDURE — 25800030 ZZH RX IP 258 OP 636: Performed by: NURSE ANESTHETIST, CERTIFIED REGISTERED

## 2020-04-24 PROCEDURE — 25000128 H RX IP 250 OP 636: Performed by: NURSE ANESTHETIST, CERTIFIED REGISTERED

## 2020-04-24 PROCEDURE — 93312 ECHO TRANSESOPHAGEAL: CPT | Mod: 26 | Performed by: INTERNAL MEDICINE

## 2020-04-24 PROCEDURE — 99233 SBSQ HOSP IP/OBS HIGH 50: CPT | Mod: 25 | Performed by: INTERNAL MEDICINE

## 2020-04-24 PROCEDURE — 40000671 ZZH STATISTIC ANESTHESIA CASE

## 2020-04-24 PROCEDURE — 36415 COLL VENOUS BLD VENIPUNCTURE: CPT | Performed by: INTERNAL MEDICINE

## 2020-04-24 PROCEDURE — 93320 DOPPLER ECHO COMPLETE: CPT | Mod: 26 | Performed by: INTERNAL MEDICINE

## 2020-04-24 PROCEDURE — 25800030 ZZH RX IP 258 OP 636: Performed by: NURSE PRACTITIONER

## 2020-04-24 PROCEDURE — 85027 COMPLETE CBC AUTOMATED: CPT | Performed by: INTERNAL MEDICINE

## 2020-04-24 PROCEDURE — 93325 DOPPLER ECHO COLOR FLOW MAPG: CPT | Mod: 26 | Performed by: INTERNAL MEDICINE

## 2020-04-24 RX ORDER — POTASSIUM CHLORIDE 1500 MG/1
20 TABLET, EXTENDED RELEASE ORAL
Status: DISCONTINUED | OUTPATIENT
Start: 2020-04-24 | End: 2020-04-24 | Stop reason: HOSPADM

## 2020-04-24 RX ORDER — IPRATROPIUM BROMIDE AND ALBUTEROL SULFATE 2.5; .5 MG/3ML; MG/3ML
3 SOLUTION RESPIRATORY (INHALATION) EVERY 4 HOURS PRN
Status: DISCONTINUED | OUTPATIENT
Start: 2020-04-24 | End: 2020-04-28 | Stop reason: HOSPADM

## 2020-04-24 RX ORDER — NALOXONE HYDROCHLORIDE 0.4 MG/ML
.1-.4 INJECTION, SOLUTION INTRAMUSCULAR; INTRAVENOUS; SUBCUTANEOUS
Status: DISCONTINUED | OUTPATIENT
Start: 2020-04-24 | End: 2020-04-28 | Stop reason: HOSPADM

## 2020-04-24 RX ORDER — FUROSEMIDE 10 MG/ML
40 INJECTION INTRAMUSCULAR; INTRAVENOUS
Status: DISCONTINUED | OUTPATIENT
Start: 2020-04-24 | End: 2020-04-27

## 2020-04-24 RX ORDER — PROPOFOL 10 MG/ML
INJECTION, EMULSION INTRAVENOUS PRN
Status: DISCONTINUED | OUTPATIENT
Start: 2020-04-24 | End: 2020-04-24

## 2020-04-24 RX ORDER — FLUMAZENIL 0.1 MG/ML
0.2 INJECTION, SOLUTION INTRAVENOUS
Status: ACTIVE | OUTPATIENT
Start: 2020-04-24 | End: 2020-04-25

## 2020-04-24 RX ORDER — FLUMAZENIL 0.1 MG/ML
0.2 INJECTION, SOLUTION INTRAVENOUS
Status: DISCONTINUED | OUTPATIENT
Start: 2020-04-24 | End: 2020-04-24 | Stop reason: HOSPADM

## 2020-04-24 RX ORDER — IOPAMIDOL 755 MG/ML
INJECTION, SOLUTION INTRAVASCULAR
Status: DISCONTINUED | OUTPATIENT
Start: 2020-04-24 | End: 2020-04-24 | Stop reason: HOSPADM

## 2020-04-24 RX ORDER — NALOXONE HYDROCHLORIDE 0.4 MG/ML
.2-.4 INJECTION, SOLUTION INTRAMUSCULAR; INTRAVENOUS; SUBCUTANEOUS
Status: ACTIVE | OUTPATIENT
Start: 2020-04-24 | End: 2020-04-25

## 2020-04-24 RX ORDER — LIDOCAINE 40 MG/G
CREAM TOPICAL
Status: DISCONTINUED | OUTPATIENT
Start: 2020-04-24 | End: 2020-04-24 | Stop reason: HOSPADM

## 2020-04-24 RX ORDER — NALOXONE HYDROCHLORIDE 0.4 MG/ML
.1-.4 INJECTION, SOLUTION INTRAMUSCULAR; INTRAVENOUS; SUBCUTANEOUS
Status: DISCONTINUED | OUTPATIENT
Start: 2020-04-24 | End: 2020-04-24 | Stop reason: HOSPADM

## 2020-04-24 RX ORDER — ATROPINE SULFATE 0.1 MG/ML
0.5 INJECTION INTRAVENOUS EVERY 5 MIN PRN
Status: ACTIVE | OUTPATIENT
Start: 2020-04-24 | End: 2020-04-25

## 2020-04-24 RX ORDER — SODIUM CHLORIDE 9 MG/ML
INJECTION, SOLUTION INTRAVENOUS CONTINUOUS
Status: DISCONTINUED | OUTPATIENT
Start: 2020-04-24 | End: 2020-04-24 | Stop reason: HOSPADM

## 2020-04-24 RX ORDER — IPRATROPIUM BROMIDE AND ALBUTEROL SULFATE 2.5; .5 MG/3ML; MG/3ML
3 SOLUTION RESPIRATORY (INHALATION)
Status: DISCONTINUED | OUTPATIENT
Start: 2020-04-24 | End: 2020-04-28 | Stop reason: HOSPADM

## 2020-04-24 RX ORDER — FENTANYL CITRATE 50 UG/ML
25-50 INJECTION, SOLUTION INTRAMUSCULAR; INTRAVENOUS
Status: ACTIVE | OUTPATIENT
Start: 2020-04-24 | End: 2020-04-25

## 2020-04-24 RX ORDER — ACETAMINOPHEN 325 MG/1
650 TABLET ORAL EVERY 4 HOURS PRN
Status: DISCONTINUED | OUTPATIENT
Start: 2020-04-24 | End: 2020-04-28 | Stop reason: HOSPADM

## 2020-04-24 RX ORDER — NITROGLYCERIN 5 MG/ML
VIAL (ML) INTRAVENOUS
Status: DISCONTINUED | OUTPATIENT
Start: 2020-04-24 | End: 2020-04-24 | Stop reason: HOSPADM

## 2020-04-24 RX ORDER — LIDOCAINE HYDROCHLORIDE 40 MG/ML
1.5 SOLUTION TOPICAL ONCE
Status: COMPLETED | OUTPATIENT
Start: 2020-04-24 | End: 2020-04-24

## 2020-04-24 RX ORDER — LIDOCAINE 50 MG/G
OINTMENT TOPICAL ONCE
Status: COMPLETED | OUTPATIENT
Start: 2020-04-24 | End: 2020-04-24

## 2020-04-24 RX ORDER — VERAPAMIL HYDROCHLORIDE 2.5 MG/ML
INJECTION, SOLUTION INTRAVENOUS
Status: DISCONTINUED | OUTPATIENT
Start: 2020-04-24 | End: 2020-04-24 | Stop reason: HOSPADM

## 2020-04-24 RX ORDER — SODIUM CHLORIDE 9 MG/ML
INJECTION, SOLUTION INTRAVENOUS CONTINUOUS
Status: DISCONTINUED | OUTPATIENT
Start: 2020-04-24 | End: 2020-04-24

## 2020-04-24 RX ORDER — DEXTROSE MONOHYDRATE 25 G/50ML
9.5 INJECTION, SOLUTION INTRAVENOUS
Status: DISCONTINUED | OUTPATIENT
Start: 2020-04-24 | End: 2020-04-24 | Stop reason: HOSPADM

## 2020-04-24 RX ORDER — FENTANYL CITRATE 50 UG/ML
INJECTION, SOLUTION INTRAMUSCULAR; INTRAVENOUS
Status: DISCONTINUED | OUTPATIENT
Start: 2020-04-24 | End: 2020-04-24 | Stop reason: HOSPADM

## 2020-04-24 RX ORDER — PREDNISONE 20 MG/1
40 TABLET ORAL 2 TIMES DAILY WITH MEALS
Status: COMPLETED | OUTPATIENT
Start: 2020-04-24 | End: 2020-04-25

## 2020-04-24 RX ORDER — GLYCOPYRROLATE 0.2 MG/ML
0.1 INJECTION, SOLUTION INTRAMUSCULAR; INTRAVENOUS ONCE
Status: COMPLETED | OUTPATIENT
Start: 2020-04-24 | End: 2020-04-24

## 2020-04-24 RX ORDER — HEPARIN SODIUM 1000 [USP'U]/ML
INJECTION, SOLUTION INTRAVENOUS; SUBCUTANEOUS
Status: DISCONTINUED | OUTPATIENT
Start: 2020-04-24 | End: 2020-04-24 | Stop reason: HOSPADM

## 2020-04-24 RX ORDER — FENTANYL CITRATE 50 UG/ML
25 INJECTION, SOLUTION INTRAMUSCULAR; INTRAVENOUS
Status: DISCONTINUED | OUTPATIENT
Start: 2020-04-24 | End: 2020-04-24 | Stop reason: HOSPADM

## 2020-04-24 RX ORDER — SODIUM CHLORIDE 9 MG/ML
INJECTION, SOLUTION INTRAVENOUS CONTINUOUS PRN
Status: DISCONTINUED | OUTPATIENT
Start: 2020-04-24 | End: 2020-04-24 | Stop reason: HOSPADM

## 2020-04-24 RX ADMIN — NITROGLYCERIN 200 MCG: 5 INJECTION, SOLUTION INTRAVENOUS at 14:42

## 2020-04-24 RX ADMIN — GLYCOPYRROLATE 0.1 MG: 0.2 INJECTION, SOLUTION INTRAMUSCULAR; INTRAVENOUS at 08:19

## 2020-04-24 RX ADMIN — MIDAZOLAM HYDROCHLORIDE 1 MG: 1 INJECTION, SOLUTION INTRAMUSCULAR; INTRAVENOUS at 08:47

## 2020-04-24 RX ADMIN — PHENYLEPHRINE HYDROCHLORIDE 50 MCG: 10 INJECTION INTRAVENOUS at 09:18

## 2020-04-24 RX ADMIN — VERAPAMIL HYDROCHLORIDE 2.5 MG: 2.5 INJECTION, SOLUTION INTRAVENOUS at 14:43

## 2020-04-24 RX ADMIN — MIDAZOLAM HYDROCHLORIDE 0.5 MG: 1 INJECTION, SOLUTION INTRAMUSCULAR; INTRAVENOUS at 08:54

## 2020-04-24 RX ADMIN — PROPOFOL 10 MG: 10 INJECTION, EMULSION INTRAVENOUS at 09:25

## 2020-04-24 RX ADMIN — MIDAZOLAM 0.5 MG: 1 INJECTION INTRAMUSCULAR; INTRAVENOUS at 14:28

## 2020-04-24 RX ADMIN — MIDAZOLAM HYDROCHLORIDE 0.5 MG: 1 INJECTION, SOLUTION INTRAMUSCULAR; INTRAVENOUS at 08:50

## 2020-04-24 RX ADMIN — MIDAZOLAM 0.5 MG: 1 INJECTION INTRAMUSCULAR; INTRAVENOUS at 14:32

## 2020-04-24 RX ADMIN — PREDNISONE 40 MG: 20 TABLET ORAL at 18:11

## 2020-04-24 RX ADMIN — FENTANYL CITRATE 25 MCG: 50 INJECTION, SOLUTION INTRAMUSCULAR; INTRAVENOUS at 08:44

## 2020-04-24 RX ADMIN — SODIUM CHLORIDE: 9 INJECTION, SOLUTION INTRAVENOUS at 08:18

## 2020-04-24 RX ADMIN — FENTANYL CITRATE 25 MCG: 50 INJECTION, SOLUTION INTRAMUSCULAR; INTRAVENOUS at 08:47

## 2020-04-24 RX ADMIN — BUDESONIDE 0.5 MG: 0.5 INHALANT RESPIRATORY (INHALATION) at 19:24

## 2020-04-24 RX ADMIN — MONTELUKAST 10 MG: 10 TABLET, FILM COATED ORAL at 12:06

## 2020-04-24 RX ADMIN — HEPARIN SODIUM 5000 UNITS: 1000 INJECTION INTRAVENOUS; SUBCUTANEOUS at 14:50

## 2020-04-24 RX ADMIN — IOPAMIDOL 150 ML: 755 INJECTION, SOLUTION INTRAVENOUS at 15:04

## 2020-04-24 RX ADMIN — PROPOFOL 50 MG: 10 INJECTION, EMULSION INTRAVENOUS at 09:18

## 2020-04-24 RX ADMIN — MIDAZOLAM HYDROCHLORIDE 0.5 MG: 1 INJECTION, SOLUTION INTRAMUSCULAR; INTRAVENOUS at 08:44

## 2020-04-24 RX ADMIN — BUDESONIDE 0.5 MG: 0.5 INHALANT RESPIRATORY (INHALATION) at 06:52

## 2020-04-24 RX ADMIN — METOPROLOL TARTRATE 25 MG: 25 TABLET ORAL at 21:25

## 2020-04-24 RX ADMIN — FUROSEMIDE 40 MG: 10 INJECTION, SOLUTION INTRAMUSCULAR; INTRAVENOUS at 18:14

## 2020-04-24 RX ADMIN — LISINOPRIL 20 MG: 20 TABLET ORAL at 12:06

## 2020-04-24 RX ADMIN — FUROSEMIDE 60 MG: 10 INJECTION, SOLUTION INTRAMUSCULAR; INTRAVENOUS at 12:10

## 2020-04-24 RX ADMIN — METOPROLOL TARTRATE 25 MG: 25 TABLET ORAL at 12:06

## 2020-04-24 RX ADMIN — MIDAZOLAM 0.5 MG: 1 INJECTION INTRAMUSCULAR; INTRAVENOUS at 14:44

## 2020-04-24 RX ADMIN — IPRATROPIUM BROMIDE AND ALBUTEROL SULFATE 3 ML: .5; 3 SOLUTION RESPIRATORY (INHALATION) at 19:25

## 2020-04-24 RX ADMIN — LIDOCAINE HYDROCHLORIDE 1.5 ML: 40 SOLUTION TOPICAL at 08:18

## 2020-04-24 RX ADMIN — FENTANYL CITRATE 25 MCG: 50 INJECTION, SOLUTION INTRAMUSCULAR; INTRAVENOUS at 14:32

## 2020-04-24 RX ADMIN — PREDNISONE 60 MG: 20 TABLET ORAL at 12:06

## 2020-04-24 RX ADMIN — PHENYLEPHRINE HYDROCHLORIDE 50 MCG: 10 INJECTION INTRAVENOUS at 09:25

## 2020-04-24 RX ADMIN — SIMVASTATIN 40 MG: 40 TABLET, FILM COATED ORAL at 21:25

## 2020-04-24 RX ADMIN — LIDOCAINE HYDROCHLORIDE 1 ML: 10 INJECTION, SOLUTION EPIDURAL; INFILTRATION; INTRACAUDAL; PERINEURAL at 14:41

## 2020-04-24 RX ADMIN — SODIUM CHLORIDE: 9 INJECTION, SOLUTION INTRAVENOUS at 14:03

## 2020-04-24 RX ADMIN — TOPICAL ANESTHETIC 1 ML: 200 SPRAY DENTAL; PERIODONTAL at 08:40

## 2020-04-24 RX ADMIN — FENTANYL CITRATE 25 MCG: 50 INJECTION, SOLUTION INTRAMUSCULAR; INTRAVENOUS at 14:44

## 2020-04-24 RX ADMIN — IPRATROPIUM BROMIDE AND ALBUTEROL SULFATE 3 ML: .5; 3 SOLUTION RESPIRATORY (INHALATION) at 15:51

## 2020-04-24 RX ADMIN — FENTANYL CITRATE 25 MCG: 50 INJECTION, SOLUTION INTRAMUSCULAR; INTRAVENOUS at 14:28

## 2020-04-24 ASSESSMENT — ACTIVITIES OF DAILY LIVING (ADL)
ADLS_ACUITY_SCORE: 14

## 2020-04-24 ASSESSMENT — MIFFLIN-ST. JEOR: SCORE: 1407.48

## 2020-04-24 ASSESSMENT — COPD QUESTIONNAIRES: COPD: 1

## 2020-04-24 NOTE — PRE-PROCEDURE
GENERAL PRE-PROCEDURE:     Written consent obtained?: Yes    Risks and benefits: Risks, benefits and alternatives were discussed    Consent given by:  Patient  Patient states understanding of procedure being performed: Yes    Patient's understanding of procedure matches consent: Yes    Procedure consent matches procedure scheduled: Yes    Expected level of sedation:  Moderate  Appropriately NPO:  Yes  ASA Class:  Class 3- Severe systemic disease, definite functional limitations  Mallampati  :  Grade 1- soft palate, uvula, tonsillar pillars, and posterior pharyngeal wall visible  Lungs:  Lungs clear with good breath sounds bilaterally  Heart:  Normal heart sounds and rate  History & Physical reviewed:  History and physical reviewed and no updates needed  Statement of review:  I have reviewed the lab findings, diagnostic data, medications, and the plan for sedation  Also specifically discussed with patient using  that in case moderate sedation is no successful for probe intubation we may need to have anesthesia and use deep sedation. Risks/benefits of moderate and deep sedation were discussed with patient in detail and he is agreeable to proceed.

## 2020-04-24 NOTE — PROGRESS NOTES
[facility]    Cardiology Progress Note    Date of Admission: 04/23/2020  Service date: 04/24/2020    Summary:  Mr. 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     Assessment & Plan   1.  Acute exacerbation of diastolic heart failure.   2.  Severe aortic stenosis, aortic valve area 0.5 cm2, mean gradient 52 mmHg and transvalvular velocity 4.9 meters per second.   3.  Chronic coronary artery disease s/p CABG with a  LIMA graft placed to the LAD and vein grafts to the OM and the RCA. Preserved left ventricular systolic function.   4.  Persistent, likely permanent atrial fibrillation.   5.  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   6.  Left atrial appendage clot, detected on CT scan for TAVR workup 02/2020. ALINE completed today.  7.  Hypertension.   8.  Stage II-III chronic renal failure.   9.  Elevated INR, likely due to passive hepatic congestion. Xarelto on hold for the time being.  10. Asthma  11.  Benign pulmonary nodules and renal cysts.  12.  Senile hearing loss, communication via sign language.     Plan:   1. Coronary angiogram today or Monday for pre-TAVR workup.  2. Plan for TAVR early next week.  3. Cardiology will continue to follow along.    Disposition Plan   Expected discharge in 3-4 days to D pending coronary angiogram and plan for TAVR.     Entered: Joesph Baer 04/24/2020, 11:44 AM     Interval History   No acute events overnight. Patient resting comfortably this morning. Shortness of breath remains about the same. He denies chest pain.    Risks and benefits of left and right heart catheterization were  discussed with the patient today via a  on the iPad, including bleeding, bruising, infection, allergic reaction, kidney damage (including need for dialysis), stroke, heart attack, vascular damage, emergency open heart surgery, up to and including death. Patient indicated understanding and is agreeable to proceed. He has no known history of an allergy to contrast dye. He is familiar with the procedure from prior coronary angiograms. All questions were answered.    Telemetry: A.Fib with controlled ventricular rates     Thank you for the opportunity to participate in this pleasant patient's care.     Jose Baer, APRN, CNP   Text Page  (8am - 5pm, M-F)    Patient Active Problem List   Diagnosis     Coronary atherosclerosis     Benign neoplasm of colon     Localized osteoarthrosis, lower leg     Allergic rhinitis     Moderate persistent asthma     Anemia     Osteoporosis     Hypertension goal BP (blood pressure) < 140/90     Hyperlipidemia with target LDL less than 100     CKD (chronic kidney disease) stage 3, GFR 30-59 ml/min (H)     Nonrheumatic aortic valve stenosis     Advanced directives, counseling/discussion     Iron deficiency anemia     Vitamin B 12 deficiency     Health Care Home     Mild persistent asthma without complication     Acute respiratory failure (H)     Physical Exam   Temp: 98.3  F (36.8  C) Temp src: Oral BP: 120/59 Pulse: 68 Heart Rate: 64 Resp: 15 SpO2: 99 % O2 Device: Nasal cannula Oxygen Delivery: 2 LPM  Vitals:    04/23/20 0314 04/23/20 0627 04/24/20 0522   Weight: 77.1 kg (170 lb) 76.9 kg (169 lb 8 oz) 74.8 kg (164 lb 14.4 oz)     Vital Signs with Ranges  Temp:  [97.6  F (36.4  C)-98.5  F (36.9  C)] 98.3  F (36.8  C)  Pulse:  [55-68] 68  Heart Rate:  [55-82] 64  Resp:  [12-20] 15  BP: (115-175)/(59-99) 120/59  SpO2:  [95 %-100 %] 99 %  I/O last 3 completed shifts:  In: 420 [P.O.:420]  Out: 3645 [Urine:3645]    Constitutional: Pleasant, elderly gentleman, appears his  stated age, well nourished, and in no acute distress.  Eyes: Pupils equal, round. Sclerae anicteric.   HEENT: Normocephalic, atraumatic.   Respiratory: Breathing non-labored. Lungs with scattered wheezes bilaterally.  Cardiovascular: Irregularly irregular rhythm, controlled rate. Grade 3/6 systolic murmur best heard at the LUSB.  Skin: Warm, dry.   Musculoskeletal/Extremities: Moves all extremities well and symmetrically. Trace lower extremity edema bilaterally.  Neurologic: No gross focal deficits. Very [place], exam completed with the assistance of  via iPad. Alert, and oriented to person, place and time.  Psychiatric: Affect appropriate. Mentation normal.    Medications     - MEDICATION INSTRUCTIONS -         budesonide  0.5 mg Nebulization BID     furosemide  60 mg Intravenous BID     lisinopril  20 mg Oral Daily     metoprolol tartrate  25 mg Oral BID     montelukast  10 mg Oral Daily     predniSONE  60 mg Oral BID w/meals     simvastatin  40 mg Oral At Bedtime     sodium chloride (PF)  3 mL Intracatheter Q8H       Data   Results for orders placed or performed during the hospital encounter of 04/23/20 (from the past 24 hour(s))   Basic metabolic panel   Result Value Ref Range    Sodium 135 133 - 144 mmol/L    Potassium 5.0 3.4 - 5.3 mmol/L    Chloride 101 94 - 109 mmol/L    Carbon Dioxide 32 20 - 32 mmol/L    Anion Gap 2 (L) 3 - 14 mmol/L    Glucose 128 (H) 70 - 99 mg/dL    Urea Nitrogen 39 (H) 7 - 30 mg/dL    Creatinine 1.74 (H) 0.66 - 1.25 mg/dL    GFR Estimate 35 (L) >60 mL/min/[1.73_m2]    GFR Estimate If Black 40 (L) >60 mL/min/[1.73_m2]    Calcium 8.9 8.5 - 10.1 mg/dL   CBC with platelets   Result Value Ref Range    WBC 5.7 4.0 - 11.0 10e9/L    RBC Count 5.49 4.4 - 5.9 10e12/L    Hemoglobin 11.3 (L) 13.3 - 17.7 g/dL    Hematocrit 36.1 (L) 40.0 - 53.0 %    MCV 66 (L) 78 - 100 fl    MCH 20.6 (L) 26.5 - 33.0 pg    MCHC 31.3 (L) 31.5 - 36.5 g/dL    RDW 17.0 (H) 10.0 - 15.0 %    Platelet  Count 104 (L) 150 - 450 10e9/L   INR   Result Value Ref Range    INR 1.24 (H) 0.86 - 1.14   Transesophageal Echocardiogram    Narrative    [id number]  [id number]  [id number]^BAUDILIO^SANDRA^ALEKSANDR [facility]  Echocardiography Laboratory  [address] [place] [address]        Name: SRIKANTH OBANDO  MRN: [id number]  : 1934  Study Date: 2020 08:39 AM  Age: 85 yrs  Gender: Male  Patient Location: [facility]  Reason For Study: Aortic Valve Disorder  Ordering Physician: SANDRA ASTUDILLO  Referring Physician: NATHAN ROME  Performed By: Alen Peres RDCS     BSA: 1.9 m2  Height: 68 in  Weight: 169 lb  HR: 81  BP: 139/71 mmHg  _____________________________________________________________________________  __        Procedure  Complete ALINE Adult. ALINE Probe serial #[id number] was used during the procedure.  The heart rate, respiratory rate and response to care were monitored  throughout the procedure with the assistance of the nurse.  _____________________________________________________________________________  __        Interpretation Summary     Left ventricular systolic function is normal.The visual ejection fraction is  estimated at 55-60%.  The right ventricular systolic function is normal.  Very dense spontaneous echo contrast seen in left atrial eppendage, in some  views it appears early/pre clot in apperance although no clear cut thrombus  noted.  Severe valvular aortic stenosis.  There is mild (1+) aortic regurgitation.  _____________________________________________________________________________  __        ALINE  I determined this patient to be an appropriate candidate for the planned  sedation and procedure and have reassessed the patient immediately prior to  sedation and procedure. Total sedation time: 46 minutes of continuous bedside  1:1 monitoring. Versed (2.5mg) was given intravenously. Fentanyl (50mcg) was  given intravenously. Consent to the procedure was obtained prior to  sedation.  Prior to the exam, the oral cavity was checked and no overcrowding was noted.  The transesophageal probe insertion was technically difficult. There were no  complications associated with this procedure. Resistance met at 18 cm tomeka  despite moderate sedation- anesthesia unable to pass probe initially and  patient was then given deeper sedation by anesthesia and probe successfully  intubated by anesthesia.  During the procedure 3D imaging could not be obtained due to machine  malfunctioining.     Left Ventricle  Left ventricular systolic function is normal. The visual ejection fraction is  estimated at 55-60%.     Right Ventricle  The right ventricular systolic function is normal.     Atria  The left atrium is moderately dilated. Spontaneous contrast in left atrium.  The right atrium is moderately dilated. There is no color Doppler evidence of  an atrial shunt. Spontaneous contrast in left atrial appendage. Very dense  spontaneous echo contrast seen in left atrial eppendage, in some views it  appears early/pre clot in apperance although no clear cut thrombus noted.        Mitral Valve  There is trace mitral regurgitation.     Tricuspid Valve  There is mild (1+) tricuspid regurgitation.     Aortic Valve  There is mild (1+) aortic regurgitation. Severe valvular aortic stenosis. LVOT  diamter 2.1 cm.  Peak aortic valve velcoity 4.6 m/sec, mean gradient 48 mm hg, LAUREEN 0.65 cm2.     Pulmonic Valve  The pulmonic valve is not well visualized.     Vessels  The aortic root is normal size. Mild atherosclerotic plaque(s) in the  descending aorta. Normal pulmonary vein velocity.        Pericardial/Pleural  There is no pericardial effusion.  _____________________________________________________________________________  __  MMode/2D Measurements & Calculations  LVOT diam: 2.1 cm  LVOT area: 3.5 cm2           Doppler Measurements & Calculations  Ao V2 max: 449.8 cm/sec  Ao max P.0 mmHg  Ao V2 mean: 323.9 cm/sec  Ao  mean P.1 mmHg  Ao V2 VTI: 123.8 cm  LAUREEN(I,D): 0.72 cm2  LAUREEN(V,D): 0.81 cm2  LV V1 max P.3 mmHg  LV V1 max: 103.9 cm/sec  LV V1 VTI: 25.4 cm  SV(LVOT): 89.2 ml  SI(LVOT): 46.9 ml/m2  AV Vishnu Ratio (DI): 0.23  LAUREEN Index (cm2/m2): 0.38           _____________________________________________________________________________  __           Report approved by: Billie Parr 2020 11:28 AM          This note was completed in part using Dragon voice recognition software. Although reviewed after completion, some word and grammatical errors may occur.

## 2020-04-24 NOTE — PLAN OF CARE
A/O, Gray[place]/Deaf, video  used for procedures/MD visits. VSS ex tele afib SVR/CVR 40s-60s c BBB, PVCs, asymptomatic. O2sats 95% on RA, currently on 1 L post angiogram. Denies pain or dizziness. Mild CLEARY, LS diminished with expiratory and inspiratory wheezes especially with exertion but recovers at rest. ALINE completed, AM medications given late d/t this procedure, now tolerating PO intake. SBA/independent. Off bedrest 17:20. R jugular site WDL, CMS intact, dressing CDI. L radial site WDL ex suspected small hematoma noted proximally while air being removed, air returned, currently 3 ml in TR band; CMS intact. Satya Maharaj updated. Plan for possible TAVR next week.

## 2020-04-24 NOTE — PROGRESS NOTES
Called and spoke with the patient's son Nicko per the patient's request to update him on the results of his ALINE and the plan for coronary angiography this afternoon for work up for possible valve replacement surgery early next week. He stated understanding and appreciated the update. All questions were answered. Jose Baer, APRN, CNP

## 2020-04-24 NOTE — ANESTHESIA PREPROCEDURE EVALUATION
Anesthesia Pre-Procedure Evaluation    Patient: Shiraz Ingram   MRN: [id number] : 1934          Preoperative Diagnosis: * No pre-op diagnosis entered *    * No procedures listed *    Past Medical History:   Diagnosis Date     Allergic rhinitis, cause unspecified      Anemia, unspecified      Aortic stenosis, mild      Benign neoplasm of colon     Ademonatous polyp     CKD (chronic kidney disease) stage 3, GFR 30-59 ml/min (H) 2011     Coronary atherosclerosis of unspecified type of vessel, native or graft     s/p CABG      Hyperlipidemia LDL goal < 100      Hypertension goal BP (blood pressure) < 140/90      Localized osteoarthrosis not specified whether primary or secondary, lower leg     left knee     Moderate persistent asthma      Unspecified hearing loss      Past Surgical History:   Procedure Laterality Date     C NONSPECIFIC PROCEDURE      Coronary artery bypass     CARDIAC SURGERY       HC COLONOSCOPY THRU STOMA W BIOPSY/CAUTERY TUMOR/POLYP/LESION      Dr. Dow, Q 5 years     HC COLONOSCOPY THRU STOMA W BIOPSY/CAUTERY TUMOR/POLYP/LESION      Dr. Dow, one adenomatous polyp     HC ESOPHAGOSCOPY, DIAGNOSTIC      Dr. Dow, lower esophageal ring & mild gastritis     HERNIORRHAPHY INGUINAL Right 2016    Procedure: HERNIORRHAPHY INGUINAL;  Surgeon: Alexis Alexandra MD;  Location: [facility]     LAPAROSCOPIC CHOLECYSTECTOMY      for biliary sludge       Anesthesia Evaluation     .             ROS/MED HX    ENT/Pulmonary:     (+)allergic rhinitis, COPD, , . .    Neurologic:       Cardiovascular:     (+) Dyslipidemia, hypertension-Peripheral Vascular Disease-CAD, --. : . . . :. valvular problems/murmurs type: AS Severe:.       METS/Exercise Tolerance:     Hematologic:     (+) Anemia, -      Musculoskeletal:         GI/Hepatic:         Renal/Genitourinary:     (+) chronic renal disease, type: CRI,       Endo:         Psychiatric:         Infectious Disease:        "  Malignancy:         Other:                          Physical Exam  Normal systems: dental    Airway   Mallampati: II  TM distance: >3 FB  Neck ROM: full    Dental     Cardiovascular   Rhythm and rate: regular      Pulmonary    breath sounds clear to auscultation            Lab Results   Component Value Date    WBC 5.7 04/24/2020    HGB 11.3 (L) 04/24/2020    HCT 36.1 (L) 04/24/2020     (L) 04/24/2020     04/24/2020    POTASSIUM 5.0 04/24/2020    CHLORIDE 101 04/24/2020    CO2 32 04/24/2020    BUN 39 (H) 04/24/2020    CR 1.74 (H) 04/24/2020     (H) 04/24/2020    AYALA 8.9 04/24/2020    PHOS 4.3 03/06/2014    ALBUMIN 3.8 04/23/2020    PROTTOTAL 7.9 04/23/2020    ALT 32 04/23/2020    AST 27 04/23/2020    ALKPHOS 70 04/23/2020    BILITOTAL 1.0 04/23/2020    LIPASE 125 02/19/2013    INR 1.24 (H) 04/24/2020    TSH 1.66 02/21/2013       Preop Vitals  BP Readings from Last 3 Encounters:   04/24/20 118/63   03/16/20 (!) 147/91   03/05/20 (!) 157/77    Pulse Readings from Last 3 Encounters:   04/24/20 68   03/16/20 61   03/05/20 56      Resp Readings from Last 3 Encounters:   04/24/20 16   03/05/20 16   02/26/20 16    SpO2 Readings from Last 3 Encounters:   04/24/20 100%   03/05/20 95%   02/26/20 98%      Temp Readings from Last 1 Encounters:   04/24/20 36.4  C (97.6  F) (Oral)    Ht Readings from Last 1 Encounters:   04/23/20 1.727 m (5' 8\")      Wt Readings from Last 1 Encounters:   04/24/20 74.8 kg (164 lb 14.4 oz)    Estimated body mass index is 25.07 kg/m  as calculated from the following:    Height as of this encounter: 1.727 m (5' 8\").    Weight as of this encounter: 74.8 kg (164 lb 14.4 oz).       Anesthesia Plan      History & Physical Review  History and physical reviewed and following examination; no interval change.    ASA Status:  4 .    NPO Status:  > 8 hours    Plan for MAC            Postoperative Care      Consents  Plan/risks discussed with: Consent deferred as patient already sedate.  " Per Cardiology, procedure urgent.                Arcadio Meadows MD

## 2020-04-24 NOTE — PROGRESS NOTES
[facility]    HOSPITALIST PROGRESS NOTE :   --------------------------------------------------    Date of Admission:  4/23/2020    Cumulative Summary: Shiraz Ingram is a 85 year old male with past medical history significant for chronic kidney disease stage III, COPD, coronary artery disease a status post CABG in 2002, hyperlipidemia, essential hypertension, atrial fibrillation on anticoagulation with Xarelto, severe aortic valve calcification whose TAVR procedure was postponed due to COVID-19.  Patient now presented to ER with acute hypoxic respiratory failure which was thought to be mostly secondary to CHF exacerbation from his valvular disease with possible mild COPD exacerbation.  Patient was admitted for further evaluation and management, COVID-19 infection was ruled out.  Cardiology was also consulted.    Assessment & Plan     Active Problems:  Acute hypoxic respiratory failure: Secondary to combination of severe aortic valve calcification and reactive airway disease exacerbation.  Improving, patient is able to get weaned off from oxygen this morning.  Severe aortic valve calcification: Patient was planned to undergo TAVR as an outpatient but unfortunately his procedure was postponed due to COVID-19.  Now patient is presenting with acute exacerbation of diastolic heart failure secondary to his severe underlying aortic stenosis with valve area of 0.5 cm and mean gradient of 52 mmHg.  Acute exacerbation of diastolic heart failure: Last echocardiogram done in January showed global and regional LV function to be normal range with ejection fraction of 55 to 60%.  Mild concentric wall thickening consistent with left ventricular hypertrophy.  Diastolic function was not assessed due to atrial fibrillation.  ED angiogram for TAVR done on February 4 showed no acute aortic pathology like dissection, intramural hematoma or contained rupture.  Cardiology has been consulted who are planning to take patient  for ALINE and then possible coronary angiogram later today.  Coronary artery disease status post CABG: With LIMA graft placed to LAD and vein grafts to OM and RCA.  Persistent, permanent atrial fibrillation: Anticoagulation with Xarelto.  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  -- Currently patient is n.p.o. to undergo coronary angiogram later, he has just came back from ALINE this morning.  -- Patient can be started on cardiac diet post angiogram if okay with cardiology.  -- ALINE results were reviewed which showed ejection fraction of 55 to 60% with normal LV function, normal right ventricular systolic function, very dense spontaneous echo contrast seen in left atrial appendage, in some views it appears early/pre-clot in appearance although no clear-cut thrombus noted, severe valvular aortic stenosis, 1+ aortic regurgitation  --Patient is planned to undergo cardiac catheterization later in the afternoon for TAVR work-up.  --Highly appreciate cardiology help in the care of this nice patient,  --Due to bump in creatinine, will go ahead and decrease Lasix to 40 mg IV twice daily, will recheck BMP tomorrow  --Continue with strict intake and output.  -- daily weight.  --Lisinopril 20 mg p.o. daily.  --Metoprolol tartrate 25 mg p.o. twice daily.  --Continue simvastatin 40 mg at the bedtime.    Moderate persistent asthma with exacerbation: Per chart review, it seems like patient has been following up with pulmonary almost 4 times a year.  His home medications include albuterol inhaler 4 to 6 hours as needed, Pulmicort nebulizer solution 2 times daily, DuoNeb 4 times a day, Singulair 10 mg p.o. daily.  Patient was noticed to be in exacerbation on admission and was a started on IV Solu-Medrol 60 mg  twice daily.    --Continue to monitor patient closely.  --Continue to wean patient off oxygen.  --We will go ahead and decrease Solu-Medrol to 40 mg IV twice daily with the plan for changing him to oral 40 mg prednisone from tomorrow or from Sunday morning.  --Continue patient on Singulair 10 mg p.o. daily.  --Continue patient on Pulmicort nebulizer solution 0.5 mg twice daily.  --We will also go ahead and order DuoNeb 4 times daily, it would be helpful to optimize his asthma care before his TAVR surgery.  --We will also order DuoNeb every 4 hours as needed if patient needs additional DuoNeb treatment.      Pulmonary nodules: Patient has been following up with Dr. Morris, was last seen by him on March 5.  In 2008 CT chest noncontrast revealed scattered noncalcified pulmonary nodules.  CT angiogram for TAVR in February 20 revealed nonspecific mediastinal and right hilar lymphadenopathy, scattered bilateral lung nodules measuring up to 6 mm and multiple renal masses concerning for malignancy.  Patient underwent CT chest abdomen and pelvis which revealed bilateral lung nodules which have been present since 2004 and they have minimally increased in size.  They are considered benign as they are over there for more than 15 years and the size is just minimally increased.  There are multiple renal simple and complex hemorrhagic cyst which are not suspicious for malignancy.    -- Continue to monitor outpatient as per protocol    Senile hearing loss  -- communication via sign language, patient also has hearing aid.    Acute renal failure with underlying Chronic kidney disease, stage III: Patient with a creatinine of 1.44 on admission.  Most likely secondary to being on diuretic.  --We will go ahead and decrease Lasix to 40 mg IV twice daily, recheck BMP tomorrow  --Avoid nephrotoxins.  --Monitor.     Anemia, thrombocytopenia: Patient with hemoglobin of 12.3 on admission with a platelet count of 103.  MCV is low at 66.  No  report of external blood loss.  --Monitor.    Diet: NPO for Medical/Clinical Reasons Except for: Other; Specify: NPO for 2 hours after ALINE then Advance diet as tolerated to Adult Basic Diet  Advance Diet as Tolerated: Clear Liquid Diet; Regular Diet Adult    Pak Catheter: not present  DVT Prophylaxis: Xarelto  Code Status: Full Code    The patient's care was discussed with the Bedside Nurse and Patient.    Disposition Plan   Expected discharge: Expecting patient to stay inpatient over the weekend, tentative TAVR next week.  She will benefit from physical and occupational therapy evaluation after the procedure.  Than 35 minutes of the time was a spent in patient care, more than 50% of the time was a spent in counseling and coordination of the care.    Bozena Groves MD, FACP  Text Page (7am - 6pm)    ----------------------------------------------------------------------------------------------------------------------      Interval History   Patient was seen and examined, denying any complaints, just came back from ALINE and tolerated the procedure well, currently remains n.p.o. for angiogram later in the afternoon, patient has hearing loss and uses the sign language.    -Data reviewed today: I reviewed all new labs and imaging results over the last 24 hours.    I personally reviewed no images or EKG's today.    Physical Exam   Temp: 98.3  F (36.8  C) Temp src: Oral BP: 120/59 Pulse: 68 Heart Rate: 64 Resp: 15 SpO2: 99 % O2 Device: Nasal cannula Oxygen Delivery: 2 LPM  Vitals:    04/23/20 0314 04/23/20 0627 04/24/20 0522   Weight: 77.1 kg (170 lb) 76.9 kg (169 lb 8 oz) 74.8 kg (164 lb 14.4 oz)     Vital Signs with Ranges  Temp:  [97.6  F (36.4  C)-98.5  F (36.9  C)] 98.3  F (36.8  C)  Pulse:  [55-68] 68  Heart Rate:  [55-82] 64  Resp:  [12-20] 15  BP: (115-175)/(59-99) 120/59  SpO2:  [95 %-100 %] 99 %  I/O last 3 completed shifts:  In: 420 [P.O.:420]  Out: 3645 [Urine:3645]    GENERAL: Alert , awake and oriented. NAD.  Conversational, appropriate. [place]  HEENT: Normocephalic. EOMI. No icterus or injection. Nares normal.   LUNGS: Bilateral end expiratory wheezing, no use of accessory muscle.  HEART: Irregular rhythm, systolic murmur heard all over the precordium, perfused extremities fair  ABDOMEN: Soft, nontender, and nondistended. Positive bowel sounds.   EXTREMITIES: 1+ bilateral lower extremity edema  NEUROLOGIC: Moves extremities x4 on command. No acute focal neurologic abnormalities noted.     Medications     - MEDICATION INSTRUCTIONS -         budesonide  0.5 mg Nebulization BID     furosemide  60 mg Intravenous BID     lisinopril  20 mg Oral Daily     metoprolol tartrate  25 mg Oral BID     montelukast  10 mg Oral Daily     predniSONE  60 mg Oral BID w/meals     simvastatin  40 mg Oral At Bedtime     sodium chloride (PF)  3 mL Intracatheter Q8H       Data   Recent Labs   Lab 20  0617 20  1130 20  0653 20  0325   WBC 5.7  --   --  5.0   HGB 11.3*  --   --  12.3*   MCV 66*  --   --  66*   *  --   --  103*   INR 1.24*  --   --  2.35*     --   --  140   POTASSIUM 5.0  --   --  4.3   CHLORIDE 101  --   --  108   CO2 32  --   --  26   BUN 39*  --   --  28   CR 1.74*  --   --  1.44*   ANIONGAP 2*  --   --  6   AYALA 8.9  --   --  9.1   *  --   --  124*   ALBUMIN  --   --   --  3.8   PROTTOTAL  --   --   --  7.9   BILITOTAL  --   --   --  1.0   ALKPHOS  --   --   --  70   ALT  --   --   --  32   AST  --   --   --  27   TROPI  --  0.015 0.017 0.018       Imaging:   Recent Results (from the past 24 hour(s))   Transesophageal Echocardiogram    Narrative    [id number]  [id number]  [id number]^BAUDILIO^SANDRA^ALEKSANDR SERRANO           [facility]  Echocardiography Laboratory  [address] [place], [place] [address]        Name: SRIKANTH OBANDO  MRN: [id number]  : 1934  Study Date: 2020 08:39 AM  Age: 85 yrs  Gender: Male  Patient Location: [facility]  Reason For Study: Aortic Valve  Disorder  Ordering Physician: SANDRA ASTUDILLO  Referring Physician: NATHAN ROME  Performed By: Alen Peres RDCS     BSA: 1.9 m2  Height: 68 in  Weight: 169 lb  HR: 81  BP: 139/71 mmHg  _____________________________________________________________________________  __        Procedure  Complete ALINE Adult. ALINE Probe serial #[id number] was used during the procedure.  The heart rate, respiratory rate and response to care were monitored  throughout the procedure with the assistance of the nurse.  _____________________________________________________________________________  __        Interpretation Summary     Left ventricular systolic function is normal.The visual ejection fraction is  estimated at 55-60%.  The right ventricular systolic function is normal.  Very dense spontaneous echo contrast seen in left atrial eppendage, in some  views it appears early/pre clot in apperance although no clear cut thrombus  noted.  Severe valvular aortic stenosis.  There is mild (1+) aortic regurgitation.  _____________________________________________________________________________  __        ALINE  I determined this patient to be an appropriate candidate for the planned  sedation and procedure and have reassessed the patient immediately prior to  sedation and procedure. Total sedation time: 46 minutes of continuous bedside  1:1 monitoring. Versed (2.5mg) was given intravenously. Fentanyl (50mcg) was  given intravenously. Consent to the procedure was obtained prior to sedation.  Prior to the exam, the oral cavity was checked and no overcrowding was noted.  The transesophageal probe insertion was technically difficult. There were no  complications associated with this procedure. Resistance met at 18 cm tomeka  despite moderate sedation- anesthesia unable to pass probe initially and  patient was then given deeper sedation by anesthesia and probe successfully  intubated by anesthesia.  During the procedure 3D imaging could not be obtained due to  machine  malfunctioining.     Left Ventricle  Left ventricular systolic function is normal. The visual ejection fraction is  estimated at 55-60%.     Right Ventricle  The right ventricular systolic function is normal.     Atria  The left atrium is moderately dilated. Spontaneous contrast in left atrium.  The right atrium is moderately dilated. There is no color Doppler evidence of  an atrial shunt. Spontaneous contrast in left atrial appendage. Very dense  spontaneous echo contrast seen in left atrial eppendage, in some views it  appears early/pre clot in apperance although no clear cut thrombus noted.        Mitral Valve  There is trace mitral regurgitation.     Tricuspid Valve  There is mild (1+) tricuspid regurgitation.     Aortic Valve  There is mild (1+) aortic regurgitation. Severe valvular aortic stenosis. LVOT  diamter 2.1 cm.  Peak aortic valve velcoity 4.6 m/sec, mean gradient 48 mm hg, LAUREEN 0.65 cm2.     Pulmonic Valve  The pulmonic valve is not well visualized.     Vessels  The aortic root is normal size. Mild atherosclerotic plaque(s) in the  descending aorta. Normal pulmonary vein velocity.        Pericardial/Pleural  There is no pericardial effusion.  _____________________________________________________________________________  __  MMode/2D Measurements & Calculations  LVOT diam: 2.1 cm  LVOT area: 3.5 cm2           Doppler Measurements & Calculations  Ao V2 max: 449.8 cm/sec  Ao max P.0 mmHg  Ao V2 mean: 323.9 cm/sec  Ao mean P.1 mmHg  Ao V2 VTI: 123.8 cm  LAUREEN(I,D): 0.72 cm2  LAUREEN(V,D): 0.81 cm2  LV V1 max P.3 mmHg  LV V1 max: 103.9 cm/sec  LV V1 VTI: 25.4 cm  SV(LVOT): 89.2 ml  SI(LVOT): 46.9 ml/m2  AV Vishnu Ratio (DI): 0.23  LAUREEN Index (cm2/m2): 0.38           _____________________________________________________________________________  __           Report approved by: Billie Parr 2020 11:28 AM

## 2020-04-24 NOTE — ANESTHESIA POSTPROCEDURE EVALUATION
Patient: Shiraz Ingram    * No procedures listed *    Diagnosis:* No pre-op diagnosis entered *  Diagnosis Additional Information: No value filed.    Anesthesia Type:  No value filed.    Note:  Anesthesia Post Evaluation    Patient location during evaluation: Bedside  Patient participation: Able to fully participate in evaluation  Level of consciousness: awake and alert  Pain management: adequate  Airway patency: patent  Cardiovascular status: acceptable  Respiratory status: acceptable  Hydration status: acceptable  PONV: none       Comments: MAC anesthetic        Last vitals:  Vitals:    04/24/20 1000 04/24/20 1015 04/24/20 1030   BP: 116/63 122/76 118/63   Pulse:      Resp: 14 16 16   Temp:      SpO2: 100% 100% 100%         Electronically Signed By: Arcadio Meadows MD  April 24, 2020  10:47 AM

## 2020-04-24 NOTE — PRE-PROCEDURE
GENERAL PRE-PROCEDURE:   Procedure:  CAG, graft angiography RHC   Date/Time:  4/24/2020 2:32 PM    Verbal consent obtained?: Yes    Written consent obtained?: Yes    Risks and benefits: Risks, benefits and alternatives were discussed    Consent given by:  Patient  Patient states understanding of procedure being performed: Yes    Patient's understanding of procedure matches consent: Yes    Procedure consent matches procedure scheduled: Yes    Expected level of sedation:  Moderate  Appropriately NPO:  Yes  ASA Class:  Class 4- Severe systemic disease, acute unstable problems  Mallampati  :  Grade 2- soft palate, base of uvula, tonsillar pillars, and portion of posterior pharyngeal wall visible  Lungs:  Lungs clear with good breath sounds bilaterally  Heart:  A-fib  History & Physical reviewed:  History and physical reviewed and no updates needed  I have examined the patient, reviewed the history, medications and pre procedural tests. Severe aortic stenosis in setting of CAD sp CAB, chronic atrial fibrillation ( history of LA clot on rivaroxaban) who is a candidate for TAVR due to severe diastolic heart failure. . I have explained to the patient the risks of death, MI, stroke, hematoma, possible urgent bypass surgery for failed PCI, use of stents, thienopyridine agents, possible peripheral vascular complications, arrhythmia, the use of FFR in clinical decision-making and alternative of medical therapy alone in regards to left heart catheterization, left ventriculography, coronary angiography, and possible percutaneous coronary intervention. The patient voiced understanding and wishes to proceed. The patient has a good left radial pulse, normal ulnar pulse and a normal Corwin's sign. We plan ultrasound guided R internal jugular access for RHC4

## 2020-04-24 NOTE — PROGRESS NOTES
0800 A/O. Pt denies difficulty swallowing or sleep apnea. Full set of dentures at home.  No teeth. NPO since MN.. Educations given re ALIEN procedure with  #0819 present through [employer] w/ verbal understanding.  All questions & concerns addressed.     ALINE: Dr. Velasquez unable to pass the probe with normal sedation.  0909  Anesthesia, Dr. Meadows here.  Pt tolerated procedure well. VSS. Total sedation given - 2.5 mg Versed & 50 mcg Fentanyl prior to anesthesia. Total sedation given by anesthesia 60 mg of propofol, and total of 100 mcg phenylephrine given for BP support, see MAR and anesthesia documentation.      1035 Pt transferred to [facility] per cart. Detailed report called to Geri GARCIA.  Resp even & unlabored upon transfer. Pt  A/O. Pt to be NPO until 1130 unless other procedures ordered requiring NPO. Both pt & nurse informed.

## 2020-04-24 NOTE — ANESTHESIA CARE TRANSFER NOTE
Patient: Shiraz Ingram    * No procedures listed *    Diagnosis: * No pre-op diagnosis entered *  Diagnosis Additional Information: No value filed.    Anesthesia Type:   No value filed.     Note:  Airway :Nasal Cannula  Destination: [facility].  Comments: At end of procedure, spontaneous respirations, patient alert to voice, able to follow commands. Oxygen via nasal cannula at 3 liters per minute. Oxygen tubing connected to wall O2 in PACU, SpO2, NiBP, and EKG monitors and alarms on and functioning, report on patient's clinical status given to Echo Suite RN, RN questions answered.            Vitals: (Last set prior to Anesthesia Care Transfer)    CRNA VITALS  4/24/2020 0905 - 4/24/2020 0954      4/24/2020             SpO2:  98 %                Electronically Signed By: EVA Cagle CRNA  April 24, 2020  9:54 AM

## 2020-04-25 LAB
ANION GAP SERPL CALCULATED.3IONS-SCNC: 2 MMOL/L (ref 3–14)
BUN SERPL-MCNC: 46 MG/DL (ref 7–30)
CALCIUM SERPL-MCNC: 8.9 MG/DL (ref 8.5–10.1)
CHLORIDE SERPL-SCNC: 97 MMOL/L (ref 94–109)
CO2 SERPL-SCNC: 35 MMOL/L (ref 20–32)
CREAT SERPL-MCNC: 1.75 MG/DL (ref 0.66–1.25)
ERYTHROCYTE [DISTWIDTH] IN BLOOD BY AUTOMATED COUNT: 16.9 % (ref 10–15)
GFR SERPL CREATININE-BSD FRML MDRD: 35 ML/MIN/{1.73_M2}
GLUCOSE SERPL-MCNC: 120 MG/DL (ref 70–99)
HCT VFR BLD AUTO: 37 % (ref 40–53)
HGB BLD-MCNC: 11.8 G/DL (ref 13.3–17.7)
LMWH PPP CHRO-ACNC: 0.21 IU/ML
MCH RBC QN AUTO: 20.5 PG (ref 26.5–33)
MCHC RBC AUTO-ENTMCNC: 31.9 G/DL (ref 31.5–36.5)
MCV RBC AUTO: 64 FL (ref 78–100)
PLATELET # BLD AUTO: 114 10E9/L (ref 150–450)
POTASSIUM SERPL-SCNC: 4.6 MMOL/L (ref 3.4–5.3)
RBC # BLD AUTO: 5.75 10E12/L (ref 4.4–5.9)
SODIUM SERPL-SCNC: 134 MMOL/L (ref 133–144)
WBC # BLD AUTO: 6.7 10E9/L (ref 4–11)

## 2020-04-25 PROCEDURE — 40000275 ZZH STATISTIC RCP TIME EA 10 MIN

## 2020-04-25 PROCEDURE — 25000128 H RX IP 250 OP 636

## 2020-04-25 PROCEDURE — 85027 COMPLETE CBC AUTOMATED: CPT | Performed by: INTERNAL MEDICINE

## 2020-04-25 PROCEDURE — 99233 SBSQ HOSP IP/OBS HIGH 50: CPT | Performed by: INTERNAL MEDICINE

## 2020-04-25 PROCEDURE — 25000132 ZZH RX MED GY IP 250 OP 250 PS 637: Performed by: INTERNAL MEDICINE

## 2020-04-25 PROCEDURE — 99232 SBSQ HOSP IP/OBS MODERATE 35: CPT | Performed by: INTERNAL MEDICINE

## 2020-04-25 PROCEDURE — 99207 ZZC CDG-MDM COMPONENT: MEETS MODERATE - UP CODED: CPT | Performed by: INTERNAL MEDICINE

## 2020-04-25 PROCEDURE — 94640 AIRWAY INHALATION TREATMENT: CPT | Mod: 76

## 2020-04-25 PROCEDURE — 36415 COLL VENOUS BLD VENIPUNCTURE: CPT | Performed by: INTERNAL MEDICINE

## 2020-04-25 PROCEDURE — 25000128 H RX IP 250 OP 636: Performed by: INTERNAL MEDICINE

## 2020-04-25 PROCEDURE — 21000001 ZZH R&B HEART CARE

## 2020-04-25 PROCEDURE — 25000125 ZZHC RX 250: Performed by: INTERNAL MEDICINE

## 2020-04-25 PROCEDURE — 94640 AIRWAY INHALATION TREATMENT: CPT

## 2020-04-25 PROCEDURE — 80048 BASIC METABOLIC PNL TOTAL CA: CPT | Performed by: INTERNAL MEDICINE

## 2020-04-25 PROCEDURE — 85520 HEPARIN ASSAY: CPT | Performed by: INTERNAL MEDICINE

## 2020-04-25 PROCEDURE — 25000131 ZZH RX MED GY IP 250 OP 636 PS 637: Performed by: INTERNAL MEDICINE

## 2020-04-25 RX ORDER — PREDNISONE 20 MG/1
40 TABLET ORAL DAILY
Status: DISCONTINUED | OUTPATIENT
Start: 2020-04-26 | End: 2020-04-28 | Stop reason: HOSPADM

## 2020-04-25 RX ORDER — HEPARIN SODIUM 10000 [USP'U]/100ML
850 INJECTION, SOLUTION INTRAVENOUS CONTINUOUS
Status: DISCONTINUED | OUTPATIENT
Start: 2020-04-25 | End: 2020-04-27

## 2020-04-25 RX ADMIN — BUDESONIDE 0.5 MG: 0.5 INHALANT RESPIRATORY (INHALATION) at 19:15

## 2020-04-25 RX ADMIN — BUDESONIDE 0.5 MG: 0.5 INHALANT RESPIRATORY (INHALATION) at 07:08

## 2020-04-25 RX ADMIN — IPRATROPIUM BROMIDE AND ALBUTEROL SULFATE 3 ML: .5; 3 SOLUTION RESPIRATORY (INHALATION) at 15:12

## 2020-04-25 RX ADMIN — PREDNISONE 40 MG: 20 TABLET ORAL at 17:01

## 2020-04-25 RX ADMIN — LISINOPRIL 20 MG: 20 TABLET ORAL at 09:51

## 2020-04-25 RX ADMIN — FUROSEMIDE 40 MG: 10 INJECTION, SOLUTION INTRAMUSCULAR; INTRAVENOUS at 09:51

## 2020-04-25 RX ADMIN — IPRATROPIUM BROMIDE AND ALBUTEROL SULFATE 3 ML: .5; 3 SOLUTION RESPIRATORY (INHALATION) at 11:35

## 2020-04-25 RX ADMIN — SIMVASTATIN 40 MG: 40 TABLET, FILM COATED ORAL at 21:48

## 2020-04-25 RX ADMIN — METOPROLOL TARTRATE 25 MG: 25 TABLET ORAL at 20:03

## 2020-04-25 RX ADMIN — IPRATROPIUM BROMIDE AND ALBUTEROL SULFATE 3 ML: .5; 3 SOLUTION RESPIRATORY (INHALATION) at 07:08

## 2020-04-25 RX ADMIN — MONTELUKAST 10 MG: 10 TABLET, FILM COATED ORAL at 09:51

## 2020-04-25 RX ADMIN — FUROSEMIDE 40 MG: 10 INJECTION, SOLUTION INTRAMUSCULAR; INTRAVENOUS at 16:58

## 2020-04-25 RX ADMIN — METOPROLOL TARTRATE 25 MG: 25 TABLET ORAL at 09:51

## 2020-04-25 RX ADMIN — IPRATROPIUM BROMIDE AND ALBUTEROL SULFATE 3 ML: .5; 3 SOLUTION RESPIRATORY (INHALATION) at 19:15

## 2020-04-25 RX ADMIN — PREDNISONE 40 MG: 20 TABLET ORAL at 09:51

## 2020-04-25 RX ADMIN — HEPARIN SODIUM 850 UNITS/HR: 10000 INJECTION, SOLUTION INTRAVENOUS at 16:56

## 2020-04-25 ASSESSMENT — MIFFLIN-ST. JEOR: SCORE: 1393.42

## 2020-04-25 ASSESSMENT — ACTIVITIES OF DAILY LIVING (ADL)
ADLS_ACUITY_SCORE: 14

## 2020-04-25 NOTE — PLAN OF CARE
PT:  Orders received and chart reviewed. Discussed with RN, no  scheduled. TAVR workup in process for possible TAVR on Monday. Per previous nursing documentation, patient up in room ind-SBA. RN to continue to mobilize with patient in room. PT will hold at this time until further POC established.

## 2020-04-25 NOTE — PROGRESS NOTES
Patient is on RA with SpO2 in the mid 90's. BS diminished coarse crackles with an increase in aeration post neb treatments. All nebs were given as ordered.  Will cont to follow.  4/25/2020  Elif Goddard, RT

## 2020-04-25 NOTE — PROGRESS NOTES
[facility]    HOSPITALIST PROGRESS NOTE :   --------------------------------------------------    Date of Admission:  4/23/2020    Cumulative Summary: Shiraz Ingram is a 85 year old male with past medical history significant for chronic kidney disease stage III, COPD, coronary artery disease a status post CABG in 2002, hyperlipidemia, essential hypertension, atrial fibrillation on anticoagulation with Xarelto, severe aortic valve calcification whose TAVR procedure was postponed due to COVID-19.  Patient now presented to ER with acute hypoxic respiratory failure which was thought to be mostly secondary to CHF exacerbation from his valvular disease with possible mild COPD exacerbation.  Patient was admitted for further evaluation and management, COVID-19 infection was ruled out. Cardiology was also consulted.    Assessment & Plan     Active Problems:  Acute hypoxic respiratory failure: Secondary to combination of severe aortic valve calcification and reactive airway disease exacerbation.  Improving, patient is able to get weaned off from oxygen this morning.  Severe aortic valve calcification: Patient was planned to undergo TAVR as an outpatient but unfortunately his procedure was postponed due to COVID-19.  Now patient is presenting with acute exacerbation of diastolic heart failure secondary to his severe underlying aortic stenosis with valve area of 0.5 cm and mean gradient of 52 mmHg.  Acute exacerbation of diastolic heart failure: Last echocardiogram done in January showed global and regional LV function to be normal range with ejection fraction of 55 to 60%.  Mild concentric wall thickening consistent with left ventricular hypertrophy.  Diastolic function was not assessed due to atrial fibrillation.  ED angiogram for TAVR done on February 4 showed no acute aortic pathology like dissection, intramural hematoma or contained rupture.  Cardiology took patient for ALINE and coronary angiogram yesterday,  patient tolerated the procedure well.  Coronary artery disease status post CABG: With LIMA graft placed to LAD and vein grafts to OM and RCA.  Persistent, permanent atrial fibrillation: Anticoagulation with Xarelto.  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  --Patient underwent ALINE and was found to have normal LV systolic function with ejection fraction of 55 to 60%, very dense spontaneous echo contrast seen in the left atrial appendage which appears early/pre-clot in appearance although no clear-cut thrombus was noted, once again patient was seen to have severe valvular aortic stenosis.  --Also underwent cardiac catheterization for TAVR work-up and was found to have severe three-vessel coronary artery disease  --Continue patient on Lasix 40 mg IV twice daily.  --Continue patient on lisinopril 20 mg p.o. daily.  --Continue patient on metoprolol tartrate 25 mg p.o. twice daily.  --Continue patient on simvastatin 40 mg at bedtime.  --We will continue to hold patient's Xarelto till we have a clear recommendation from cardiology if it can be is started on it needs to be held for TAVR surgery possibly next week on Monday.  --Continue with strict intake and output.  --Continue daily weight.    Moderate persistent asthma with exacerbation: Per chart review, it seems like patient has been following up with pulmonary almost 4 times a year.  His home medications include albuterol inhaler 4 to 6 hours as needed, Pulmicort nebulizer solution 2 times daily, DuoNeb 4 times a day, Singulair 10 mg p.o. daily.  Patient was noticed to be in exacerbation on admission and was a started on IV Solu-Medrol 60 mg twice daily.  Patient is weaned down to room air today and his wheezing is significantly  improved.    --Continue to monitor patient closely.  --Continue to wean patient off oxygen.  --We will discontinue IV Solu-Medrol and will order oral prednisone 40 mg p.o. daily for next 5 days.  --Continue patient on Singulair 10 mg p.o. daily.  --Continue patient on Pulmicort nebulizer solution 0.5 mg twice daily.  --Continue patient on DuoNeb 4 times daily which would be helpful to optimize his asthma care before his TAVR surgery.  --Continue duo nebs every 4 hours as needed if patient needs additional DuoNeb treatment.    Pulmonary nodules: Patient has been following up with Dr. Morris, was last seen by him on March 5.  In 2008 CT chest noncontrast revealed scattered noncalcified pulmonary nodules.  CT angiogram for TAVR in February 20 revealed nonspecific mediastinal and right hilar lymphadenopathy, scattered bilateral lung nodules measuring up to 6 mm and multiple renal masses concerning for malignancy.  Patient underwent CT chest abdomen and pelvis which revealed bilateral lung nodules which have been present since 2004 and they have minimally increased in size.  They are considered benign as they are over there for more than 15 years and the size is just minimally increased.  There are multiple renal simple and complex hemorrhagic cyst which are not suspicious for malignancy.    -- Continue to monitor outpatient as per protocol    Senile hearing loss  -- communication via sign language, patient also has hearing aid.    Acute renal failure with underlying Chronic kidney disease, stage III: Patient with a creatinine of 1.44 on admission.  Most likely secondary to being on diuretic.  --We will go ahead and decrease Lasix to 40 mg IV twice daily, recheck BMP tomorrow  --Avoid nephrotoxins.  --Monitor.     Anemia, thrombocytopenia: Patient with hemoglobin of 12.3 on admission with a platelet count of 103.  MCV is low at 66.  No report of external blood loss.  --Monitor.    Diet: Combination Diet Low Saturated Fat  Na <2400mg Diet, No Caffeine Diet    Pak Catheter: not present  DVT Prophylaxis: Xarelto  Code Status: Full Code    The patient's care was discussed with the Bedside Nurse and Patient.    Disposition Plan   Expected discharge: Expecting patient to stay inpatient over the weekend, tentative TAVR next week.  he will benefit from physical and occupational therapy evaluation after the procedure.    Bozena Groves MD, [facility]  Text Page (7am - 6pm)    ----------------------------------------------------------------------------------------------------------------------      Interval History    Patient was seen and examined, denying any complaints, hard of hearing, bedside RN also present, denying any discomfort today, discussed with him regarding angiogram and ALINE findings and plan for possible evaluation by TAVR team on Monday.    -Data reviewed today: I reviewed all new labs and imaging results over the last 24 hours.    I personally reviewed no images or EKG's today.    Physical Exam   Temp: 98.3  F (36.8  C) Temp src: Oral BP: 109/56 Pulse: 57 Heart Rate: 76 Resp: 16 SpO2: 96 % O2 Device: None (Room air) Oxygen Delivery: 1 LPM  Vitals:    04/23/20 0627 04/24/20 0522 04/25/20 0200   Weight: 76.9 kg (169 lb 8 oz) 74.8 kg (164 lb 14.4 oz) 73.4 kg (161 lb 12.8 oz)     Vital Signs with Ranges  Temp:  [97.6  F (36.4  C)-98.3  F (36.8  C)] 98.3  F (36.8  C)  Pulse:  [57] 57  Heart Rate:  [45-90] 76  Resp:  [12-16] 16  BP: (103-153)/(42-95) 109/56  SpO2:  [89 %-100 %] 96 %  I/O last 3 completed shifts:  In: 540 [P.O.:240; I.V.:300]  Out: 2850 [Urine:2850]    GENERAL: Alert , awake and oriented. NAD. Conversational, appropriate. [place]  HEENT: Normocephalic. EOMI. No icterus or injection. Nares normal.   LUNGS: Bilateral end expiratory wheezing, no use of accessory muscle.  HEART: Irregular rhythm, systolic murmur heard all over the precordium, perfused extremities fair  ABDOMEN: Soft, nontender, and nondistended. Positive bowel  sounds.   EXTREMITIES: 1+ bilateral lower extremity edema  NEUROLOGIC: Moves extremities x4 on command. No acute focal neurologic abnormalities noted.     Medications     - MEDICATION INSTRUCTIONS -         aspirin  325 mg Oral Once     budesonide  0.5 mg Nebulization BID     furosemide  40 mg Intravenous BID     ipratropium - albuterol 0.5 mg/2.5 mg/3 mL  3 mL Nebulization 4x daily     lisinopril  20 mg Oral Daily     metoprolol tartrate  25 mg Oral BID     montelukast  10 mg Oral Daily     predniSONE  40 mg Oral BID w/meals     simvastatin  40 mg Oral At Bedtime     sodium chloride (PF)  3 mL Intracatheter Q8H       Data   Recent Labs   Lab 04/25/20  0641 04/24/20  0617 04/23/20  1130 04/23/20  0653 04/23/20  0325   WBC  --  5.7  --   --  5.0   HGB  --  11.3*  --   --  12.3*   MCV  --  66*  --   --  66*   PLT  --  104*  --   --  103*   INR  --  1.24*  --   --  2.35*    135  --   --  140   POTASSIUM 4.6 5.0  --   --  4.3   CHLORIDE 97 101  --   --  108   CO2 35* 32  --   --  26   BUN 46* 39*  --   --  28   CR 1.75* 1.74*  --   --  1.44*   ANIONGAP 2* 2*  --   --  6   AYALA 8.9 8.9  --   --  9.1   * 128*  --   --  124*   ALBUMIN  --   --   --   --  3.8   PROTTOTAL  --   --   --   --  7.9   BILITOTAL  --   --   --   --  1.0   ALKPHOS  --   --   --   --  70   ALT  --   --   --   --  32   AST  --   --   --   --  27   TROPI  --   --  0.015 0.017 0.018       Imaging:   Recent Results (from the past 24 hour(s))   Cardiac Catheterization    Narrative    1.  Severe 3-vessel CAD as described below.  2.  Moderate-severe touchdown lesion of the rPDA branch of the   TUO-nSLB-mDB Y-graft.  Otherwise widely patent LIMA-LAD, SVG-OM, and rPL   Y-graft branch.  3.  Moderately elevated right heart pressures, moderately elevated PCWP,   and low-normal cardiac index by Sofi.

## 2020-04-25 NOTE — PLAN OF CARE
Patient is alert and oriented times 4, Very [place].  cms is intact. No co pain. Patient is up with SBA/Indep.  pt voiding per Urinal, had BM today. CLEARY on RA. VSS. Afib CVR. Cardiology to see this evening and decide if xeralto is to be restarted vs heparin gtt. TAVR team to see Monday.

## 2020-04-25 NOTE — PLAN OF CARE
Neuro: alert and oriented, Right hearing aide, very hard of hearing, signs. Jabber in the room.    Cardiac: Afib with BBB, HR 50-70s, BP 130s/70s, heart murmur present    Respiratory: 1 liter nasal cannula    Skin: Left radial site - bruising, soft, CMS intact; Right internal jugular site - CDI, CMS intact    GI/: no issues    Activity: stand by    Discharge:  pending TAVR workup    Updates:   -Xarelto on hold  -No complaints of pain overnight  -Decreased IV lasix yesterday

## 2020-04-26 ENCOUNTER — APPOINTMENT (OUTPATIENT)
Dept: PHYSICAL THERAPY | Facility: CLINIC | Age: 85
DRG: 286 | End: 2020-04-26
Payer: COMMERCIAL

## 2020-04-26 LAB
ANION GAP SERPL CALCULATED.3IONS-SCNC: 4 MMOL/L (ref 3–14)
BUN SERPL-MCNC: 48 MG/DL (ref 7–30)
CALCIUM SERPL-MCNC: 9.1 MG/DL (ref 8.5–10.1)
CHLORIDE SERPL-SCNC: 98 MMOL/L (ref 94–109)
CO2 SERPL-SCNC: 34 MMOL/L (ref 20–32)
CREAT SERPL-MCNC: 1.62 MG/DL (ref 0.66–1.25)
GFR SERPL CREATININE-BSD FRML MDRD: 38 ML/MIN/{1.73_M2}
GLUCOSE SERPL-MCNC: 112 MG/DL (ref 70–99)
LMWH PPP CHRO-ACNC: 0.3 IU/ML
POTASSIUM SERPL-SCNC: 4.4 MMOL/L (ref 3.4–5.3)
SODIUM SERPL-SCNC: 136 MMOL/L (ref 133–144)

## 2020-04-26 PROCEDURE — 25000131 ZZH RX MED GY IP 250 OP 636 PS 637: Performed by: INTERNAL MEDICINE

## 2020-04-26 PROCEDURE — 97530 THERAPEUTIC ACTIVITIES: CPT | Mod: GP

## 2020-04-26 PROCEDURE — 25000128 H RX IP 250 OP 636: Performed by: INTERNAL MEDICINE

## 2020-04-26 PROCEDURE — 21000001 ZZH R&B HEART CARE

## 2020-04-26 PROCEDURE — 25000125 ZZHC RX 250: Performed by: INTERNAL MEDICINE

## 2020-04-26 PROCEDURE — 25000132 ZZH RX MED GY IP 250 OP 250 PS 637: Performed by: INTERNAL MEDICINE

## 2020-04-26 PROCEDURE — 99233 SBSQ HOSP IP/OBS HIGH 50: CPT | Performed by: INTERNAL MEDICINE

## 2020-04-26 PROCEDURE — 36415 COLL VENOUS BLD VENIPUNCTURE: CPT | Performed by: INTERNAL MEDICINE

## 2020-04-26 PROCEDURE — 40000275 ZZH STATISTIC RCP TIME EA 10 MIN

## 2020-04-26 PROCEDURE — 99207 ZZC CDG-MDM COMPONENT: MEETS MODERATE - UP CODED: CPT | Performed by: INTERNAL MEDICINE

## 2020-04-26 PROCEDURE — 94640 AIRWAY INHALATION TREATMENT: CPT

## 2020-04-26 PROCEDURE — 94640 AIRWAY INHALATION TREATMENT: CPT | Mod: 76

## 2020-04-26 PROCEDURE — 80048 BASIC METABOLIC PNL TOTAL CA: CPT | Performed by: INTERNAL MEDICINE

## 2020-04-26 PROCEDURE — 85520 HEPARIN ASSAY: CPT | Performed by: INTERNAL MEDICINE

## 2020-04-26 PROCEDURE — 97161 PT EVAL LOW COMPLEX 20 MIN: CPT | Mod: GP

## 2020-04-26 PROCEDURE — 99232 SBSQ HOSP IP/OBS MODERATE 35: CPT | Performed by: INTERNAL MEDICINE

## 2020-04-26 PROCEDURE — 97116 GAIT TRAINING THERAPY: CPT | Mod: GP

## 2020-04-26 RX ADMIN — BUDESONIDE 0.5 MG: 0.5 INHALANT RESPIRATORY (INHALATION) at 08:45

## 2020-04-26 RX ADMIN — METOPROLOL TARTRATE 25 MG: 25 TABLET ORAL at 08:51

## 2020-04-26 RX ADMIN — PREDNISONE 40 MG: 20 TABLET ORAL at 08:51

## 2020-04-26 RX ADMIN — MONTELUKAST 10 MG: 10 TABLET, FILM COATED ORAL at 08:51

## 2020-04-26 RX ADMIN — FUROSEMIDE 40 MG: 10 INJECTION, SOLUTION INTRAMUSCULAR; INTRAVENOUS at 08:50

## 2020-04-26 RX ADMIN — IPRATROPIUM BROMIDE AND ALBUTEROL SULFATE 3 ML: .5; 3 SOLUTION RESPIRATORY (INHALATION) at 11:12

## 2020-04-26 RX ADMIN — IPRATROPIUM BROMIDE AND ALBUTEROL SULFATE 3 ML: .5; 3 SOLUTION RESPIRATORY (INHALATION) at 08:45

## 2020-04-26 RX ADMIN — LISINOPRIL 20 MG: 20 TABLET ORAL at 08:51

## 2020-04-26 RX ADMIN — IPRATROPIUM BROMIDE AND ALBUTEROL SULFATE 3 ML: .5; 3 SOLUTION RESPIRATORY (INHALATION) at 14:59

## 2020-04-26 RX ADMIN — BUDESONIDE 0.5 MG: 0.5 INHALANT RESPIRATORY (INHALATION) at 20:08

## 2020-04-26 RX ADMIN — IPRATROPIUM BROMIDE AND ALBUTEROL SULFATE 3 ML: .5; 3 SOLUTION RESPIRATORY (INHALATION) at 20:08

## 2020-04-26 RX ADMIN — HEPARIN SODIUM 850 UNITS/HR: 10000 INJECTION, SOLUTION INTRAVENOUS at 21:18

## 2020-04-26 RX ADMIN — SIMVASTATIN 40 MG: 40 TABLET, FILM COATED ORAL at 21:57

## 2020-04-26 RX ADMIN — METOPROLOL TARTRATE 25 MG: 25 TABLET ORAL at 21:57

## 2020-04-26 RX ADMIN — FUROSEMIDE 40 MG: 10 INJECTION, SOLUTION INTRAMUSCULAR; INTRAVENOUS at 16:15

## 2020-04-26 ASSESSMENT — ACTIVITIES OF DAILY LIVING (ADL)
ADLS_ACUITY_SCORE: 14

## 2020-04-26 ASSESSMENT — MIFFLIN-ST. JEOR: SCORE: 1387.52

## 2020-04-26 NOTE — PROGRESS NOTES
04/26/20 1100   Quick Adds   Type of Visit Initial PT Evaluation   Living Environment   Lives With child(perla), adult   Living Arrangements house   Home Accessibility stairs to enter home   Number of Stairs, Main Entrance 2   Self-Care   Usual Activity Tolerance moderate   Current Activity Tolerance moderate   Equipment Currently Used at Home none   Functional Level Prior   Ambulation 0-->independent   Transferring 0-->independent   Toileting 0-->independent   Bathing 0-->independent   Fall history within last six months no   Which of the above functional risks had a recent onset or change? none   General Information   Onset of Illness/Injury or Date of Surgery - Date 04/23/20   Referring Physician Bozena Groves MD    Patient/Family Goals Statement Not stated   Pertinent History of Current Problem (include personal factors and/or comorbidities that impact the POC) Patient admitted on 4/23/2020 for SOB. Found to have acute on chronic diastolic heart failure exacerbation and severe aortic stenosis. Pending TAVR workup with TAVR team on Monday. Patient with PMH of severe aortic stenosis, CAD, Atrial fibrillation, stage II chronic renal failure, hypertension, left atrial appendage clot, hearing loss, and conduction system disease with right bundle branch block and a left anterior hemiblock.    Precautions/Limitations fall precautions   Weight-Bearing Status - LLE full weight-bearing   Weight-Bearing Status - RLE full weight-bearing   General Observations Patient supine in bed upon arrival of therapist, SOURAV, does best with speaking into R ear.    Cognitive Status Examination   Orientation orientation to person, place and time   Level of Consciousness alert   Follows Commands and Answers Questions 100% of the time;able to follow multistep instructions   Pain Assessment   Patient Currently in Pain No   Integumentary/Edema   Integumentary/Edema no deficits were identifed   Posture    Posture Not impaired   Range of Motion  "(ROM)   ROM Comment B LE ROM WNL    Strength   Strength Comments Not formally assessed but at least 3+/5 with functional transfers and gait   Bed Mobility   Bed Mobility Comments Supine>sit independent   Transfer Skills   Transfer Comments Sit>stand from EOB with no AD and independent, then using IV pole for support    Gait   Gait Comments Patient ambulated to bathroom with IV pole for support and supervision, no unsteadiness or balance deficits noted    Balance   Balance no deficits were identified   General Therapy Interventions   Planned Therapy Interventions risk factor education;progressive activity/exercise;home program guidelines   Clinical Impression   Criteria for Skilled Therapeutic Intervention yes, treatment indicated   PT Diagnosis Impaired tolerance to activity    Influenced by the following impairments decreased tolerance to activity    Functional limitations due to impairments gait, stairs   Clinical Presentation Stable/Uncomplicated   Clinical Presentation Rationale Based on PMH, current presentation, and social support    Clinical Decision Making (Complexity) Low complexity   Therapy Frequency Daily   Predicted Duration of Therapy Intervention (days/wks) 3 days   Anticipated Discharge Disposition Home   Risk & Benefits of therapy have been explained Yes   Patient, Family & other staff in agreement with plan of care Yes   [facility] [employer] \"6 Clicks\"   2016, Trustees of [facility], under license to [employer].  All rights reserved.   6 Clicks Short Forms Basic Mobility Inpatient Short Form   [facility] [employer]  \"6 Clicks\" V.2 Basic Mobility Inpatient Short Form   1. Turning from your back to your side while in a flat bed without using bedrails? 4 - None   2. Moving from lying on your back to sitting on the side of a flat bed without using bedrails? 4 - None   3. Moving to and from a bed to a chair (including a wheelchair)? 4 - None   4. Standing up from a chair using your " arms (e.g., wheelchair, or bedside chair)? 4 - None   5. To walk in hospital room? 4 - None   6. Climbing 3-5 steps with a railing? 3 - A Little   Basic Mobility Raw Score (Score out of 24.Lower scores equate to lower levels of function) 23   Total Evaluation Time   Total Evaluation Time (Minutes) 6

## 2020-04-26 NOTE — PROGRESS NOTES
[facility]    Cardiology Progress Note    Date of Admission: 04/23/2020  Service date: 04/26/2020    Assessment/Plan  Mr. 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     Transesophageal echocardiogram 424 demonstrated preserved left ventricular ejection fraction of 55 to 60%.  He continues to have some done spontaneous echo contrast in the left atrial appendage.  This likely represents resolving thrombus.  Coronary angiogram 4/24 revealed severe three-vessel coronary disease, with moderate touchdown lesion of the right PDA branch of the vein graft to the right PDA to right posterior lateral Y graft.  Otherwise his LIMA to LAD, vein graft to the OM, and right posterior lateral Y graft branch are all widely patent.    1.    Acute on chronic diastolic heart failure exacerbation, possibly secondary to dietary indiscretion  2.  Severe aortic stenosis, aortic valve area 0.5 cm2, mean gradient 52 mmHg and transvalvular velocity 4.9 meters per second.   3.    Severe coronary artery disease s/p CABG with a  LIMA graft placed to the LAD and vein grafts to the OM and the RCA.    4.  Persistent, likely permanent atrial fibrillation.   5.  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   6.  Left atrial appendage clot, detected on CT scan for TAVR workup 02/2020; improving  7.  Hypertension.   8.  Stage II-III chronic renal failure.      Plan:   1. Continue with careful gentle diuresis; monitor renal function closely; creatinine appears to be down-trending today   2. Plan for evaluation by TAVR team tomorrow (Monday), with plan to possibly proceed  with TAVR during this hospital stay.   3. Continue with heparin drip for now in place of Xarelto for left atrial appendage thrombus history (mostly resolved), given possible TAVR this week  4. Cardiology will continue to follow along.       Interval History   Remains stable without acute events overnight. Resting comfortably in room. I helped him up to walk to the bathroom in his room and he did well with this.     Telemetry: A.Fib with controlled ventricular rates     Thank you for the opportunity to participate in this pleasant patient's care.     Eloy Castillo MD     Patient Active Problem List   Diagnosis     Coronary atherosclerosis     Benign neoplasm of colon     Localized osteoarthrosis, lower leg     Allergic rhinitis     Moderate persistent asthma     Anemia     Osteoporosis     Hypertension goal BP (blood pressure) < 140/90     Hyperlipidemia with target LDL less than 100     CKD (chronic kidney disease) stage 3, GFR 30-59 ml/min (H)     Nonrheumatic aortic valve stenosis     Advanced directives, counseling/discussion     Iron deficiency anemia     Vitamin B 12 deficiency     Health Care Home     Mild persistent asthma without complication     Acute respiratory failure (H)     Physical Exam   Temp: 98.4  F (36.9  C) Temp src: Oral BP: 130/73 Pulse: 65 Heart Rate: 71 Resp: 16 SpO2: 98 % O2 Device: Nasal cannula Oxygen Delivery: 1 LPM  Vitals:    04/24/20 0522 04/25/20 0200 04/26/20 0544   Weight: 74.8 kg (164 lb 14.4 oz) 73.4 kg (161 lb 12.8 oz) 72.8 kg (160 lb 8 oz)     Vital Signs with Ranges  Temp:  [98.1  F (36.7  C)-98.6  F (37  C)] 98.4  F (36.9  C)  Pulse:  [65] 65  Heart Rate:  [64-83] 71  Resp:  [16] 16  BP: (124-147)/(67-78) 130/73  SpO2:  [93 %-98 %] 98 %  I/O last 3 completed shifts:  In: 1200 [P.O.:1200]  Out: 3450 [Urine:3450]     General: Patient is a pleasant well-appearing male who appears his stated age and is in no acute distress.  Eyes: Anicteric sclera.  Vessels: Jugular venous  pressure within normal limits.  Radial dorsalis pedis pulses intact bilaterally.  Heart: Normal S1, S2.  Irregularly irregular.  3 out of 6 systolic ejection murmur at the upper sternal border.  Lungs: Clear bilaterally.  Abdomen: Soft, nontender.  Nondistended.  Extremities: Warm, well-perfused.  No cyanosis, clubbing.  Trace bilateral lower extremity noted.  Mental: Patient is alert and oriented x3 with appropriate mood and affect.      Medications     HEParin 850 Units/hr (04/26/20 0853)     - MEDICATION INSTRUCTIONS -         aspirin  325 mg Oral Once     budesonide  0.5 mg Nebulization BID     furosemide  40 mg Intravenous BID     ipratropium - albuterol 0.5 mg/2.5 mg/3 mL  3 mL Nebulization 4x daily     lisinopril  20 mg Oral Daily     metoprolol tartrate  25 mg Oral BID     montelukast  10 mg Oral Daily     predniSONE  40 mg Oral Daily     simvastatin  40 mg Oral At Bedtime     sodium chloride (PF)  3 mL Intracatheter Q8H       Data   Results for orders placed or performed during the hospital encounter of 04/23/20 (from the past 24 hour(s))   CBC with platelets   Result Value Ref Range    WBC 6.7 4.0 - 11.0 10e9/L    RBC Count 5.75 4.4 - 5.9 10e12/L    Hemoglobin 11.8 (L) 13.3 - 17.7 g/dL    Hematocrit 37.0 (L) 40.0 - 53.0 %    MCV 64 (L) 78 - 100 fl    MCH 20.5 (L) 26.5 - 33.0 pg    MCHC 31.9 31.5 - 36.5 g/dL    RDW 16.9 (H) 10.0 - 15.0 %    Platelet Count 114 (L) 150 - 450 10e9/L   Heparin 10a Level   Result Value Ref Range    Heparin 10A Level 0.21 IU/mL   Heparin Xa (10a) Level   Result Value Ref Range    Heparin 10A Level 0.30 IU/mL   Basic metabolic panel   Result Value Ref Range    Sodium 136 133 - 144 mmol/L    Potassium 4.4 3.4 - 5.3 mmol/L    Chloride 98 94 - 109 mmol/L    Carbon Dioxide 34 (H) 20 - 32 mmol/L    Anion Gap 4 3 - 14 mmol/L    Glucose 112 (H) 70 - 99 mg/dL    Urea Nitrogen 48 (H) 7 - 30 mg/dL    Creatinine 1.62 (H) 0.66 - 1.25 mg/dL    GFR Estimate 38 (L) >60 mL/min/[1.73_m2]    GFR  Estimate If Black 44 (L) >60 mL/min/[1.73_m2]    Calcium 9.1 8.5 - 10.1 mg/dL

## 2020-04-26 NOTE — PLAN OF CARE
[id number]: A&Ox4, Very [place]. VSS on RA. CMS intact.  R jugular dressing CDI, Band aid on L radial, CDI. Denies pain. SBA.  pt voiding per Urinal and BR. Tele: Afib CVR with BBB. Heparin gtt. TAVR team to see Monday.  Continue to monitor.

## 2020-04-26 NOTE — PLAN OF CARE
VSS. Tele: A fib CVR. Denies any pain. Voiding without difficulty and had large BM. LS- wheezy and crackles at times. Plan for TAVR team to see pt tomorrow. Will continue to monitor.

## 2020-04-26 NOTE — PROGRESS NOTES
[facility]    Cardiology Progress Note    Date of Admission: 04/23/2020  Service date: 04/25/2020    Assessment/Plan  Mr. 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     Transesophageal echocardiogram 424 demonstrated preserved left ventricular ejection fraction of 55 to 60%.  He continues to have some done spontaneous echo contrast in the left atrial appendage.  This likely represents resolving thrombus.  Coronary angiogram 4/24 revealed severe three-vessel coronary disease, with moderate touchdown lesion of the right PDA branch of the vein graft to the right PDA to right posterior lateral Y graft.  Otherwise his LIMA to LAD, vein graft to the OM, and right posterior lateral Y graft branch are all widely patent.    1.    Acute on chronic diastolic heart failure exacerbation, possibly secondary to dietary indiscretion  2.  Severe aortic stenosis, aortic valve area 0.5 cm2, mean gradient 52 mmHg and transvalvular velocity 4.9 meters per second.   3.    Severe coronary artery disease s/p CABG with a  LIMA graft placed to the LAD and vein grafts to the OM and the RCA.    4.  Persistent, likely permanent atrial fibrillation.   5.  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   6.  Left atrial appendage clot, detected on CT scan for TAVR workup 02/2020; improving  7.  Hypertension.   8.  Stage II-III chronic renal failure.      Plan:   1. Continue gentle diuresis   2. Plan for evaluation by TAVR team Monday, with plan to possibly proceed with TAVR during this hospital stay.   3. Xarelto was held for angiogram yesterday and has not yet been  "resumed. He is taking this for atrial fibrillation and left atrial appendage thrombus, which appears to be resolving, but there is still some \"sludge\" present on ALINE. Given the possibility that he may be undergoing TAVR early next week, I will hold Xarelto, but will initiate therapeutic heparin through the weekend, as he should still receive therapeutic anticoagulation for his MAYA thrombus. There was initially some concern for elevated INR on admission, but this may have been secondary to hepatic congestion, and likely does not accurately reflect anticoagulation status; his INR yesterday had decreased to 1.24.   4. Cardiology will continue to follow along.       Interval History   No acute events overnight.  Patient is resting comfortably in his room.  We again discussed the plan to continue with diuresis this weekend and proceed with TAVR work-up on Monday.  I was able to answer all the patient's questions today.    Telemetry: A.Fib with controlled ventricular rates     Thank you for the opportunity to participate in this pleasant patient's care.     Eloy Castillo MD     Patient Active Problem List   Diagnosis     Coronary atherosclerosis     Benign neoplasm of colon     Localized osteoarthrosis, lower leg     Allergic rhinitis     Moderate persistent asthma     Anemia     Osteoporosis     Hypertension goal BP (blood pressure) < 140/90     Hyperlipidemia with target LDL less than 100     CKD (chronic kidney disease) stage 3, GFR 30-59 ml/min (H)     Nonrheumatic aortic valve stenosis     Advanced directives, counseling/discussion     Iron deficiency anemia     Vitamin B 12 deficiency     Health Care Home     Mild persistent asthma without complication     Acute respiratory failure (H)     Physical Exam   Temp: 98.1  F (36.7  C) Temp src: Oral BP: 130/73   Heart Rate: 78 Resp: 16 SpO2: 94 % O2 Device: None (Room air) Oxygen Delivery: 1 LPM  Vitals:    04/23/20 0627 04/24/20 0522 04/25/20 0200   Weight: 76.9 kg (169 " lb 8 oz) 74.8 kg (164 lb 14.4 oz) 73.4 kg (161 lb 12.8 oz)     Vital Signs with Ranges  Temp:  [98.1  F (36.7  C)-98.3  F (36.8  C)] 98.1  F (36.7  C)  Heart Rate:  [55-90] 78  Resp:  [14-16] 16  BP: (107-153)/(46-91) 130/73  SpO2:  [91 %-98 %] 94 %  I/O last 3 completed shifts:  In: 960 [P.O.:960]  Out: 3200 [Urine:3200]     General: Patient is a pleasant well-appearing male who appears his stated age and is in no acute distress.  Eyes: Anicteric sclera.  Vessels: Jugular venous pressure within normal limits.  Radial dorsalis pedis pulses intact bilaterally.  Heart: Normal S1, S2.  Irregularly irregular.  3 out of 6 systolic ejection murmur at the upper sternal border.  Lungs: Clear bilaterally.  Abdomen: Soft, nontender.  Nondistended.  Extremities: Warm, well-perfused.  No cyanosis, clubbing.  Trace bilateral lower extremity noted.  Mental: Patient is alert and oriented x3 with appropriate mood and affect.      Medications     HEParin 850 Units/hr (04/25/20 1656)     - MEDICATION INSTRUCTIONS -         aspirin  325 mg Oral Once     budesonide  0.5 mg Nebulization BID     furosemide  40 mg Intravenous BID     ipratropium - albuterol 0.5 mg/2.5 mg/3 mL  3 mL Nebulization 4x daily     lisinopril  20 mg Oral Daily     metoprolol tartrate  25 mg Oral BID     montelukast  10 mg Oral Daily     [START ON 4/26/2020] predniSONE  40 mg Oral Daily     simvastatin  40 mg Oral At Bedtime     sodium chloride (PF)  3 mL Intracatheter Q8H       Data   Results for orders placed or performed during the hospital encounter of 04/23/20 (from the past 24 hour(s))   Basic metabolic panel   Result Value Ref Range    Sodium 134 133 - 144 mmol/L    Potassium 4.6 3.4 - 5.3 mmol/L    Chloride 97 94 - 109 mmol/L    Carbon Dioxide 35 (H) 20 - 32 mmol/L    Anion Gap 2 (L) 3 - 14 mmol/L    Glucose 120 (H) 70 - 99 mg/dL    Urea Nitrogen 46 (H) 7 - 30 mg/dL    Creatinine 1.75 (H) 0.66 - 1.25 mg/dL    GFR Estimate 35 (L) >60 mL/min/[1.73_m2]    GFR  Estimate If Black 40 (L) >60 mL/min/[1.73_m2]    Calcium 8.9 8.5 - 10.1 mg/dL   CBC with platelets   Result Value Ref Range    WBC 6.7 4.0 - 11.0 10e9/L    RBC Count 5.75 4.4 - 5.9 10e12/L    Hemoglobin 11.8 (L) 13.3 - 17.7 g/dL    Hematocrit 37.0 (L) 40.0 - 53.0 %    MCV 64 (L) 78 - 100 fl    MCH 20.5 (L) 26.5 - 33.0 pg    MCHC 31.9 31.5 - 36.5 g/dL    RDW 16.9 (H) 10.0 - 15.0 %    Platelet Count 114 (L) 150 - 450 10e9/L

## 2020-04-26 NOTE — PLAN OF CARE
Pt alert, oriented and able to initiate needs.  No complaints of discomfort. Vitals remain stable and pt is afib with BBB and CVR.  Right radial and subclavian site WDL.  Ambulates to  with SBA.  Continue heparin infusion and pt will see TAVR team on Monday.      Heart Failure Care Pathway  GOALS TO BE MET BEFORE DISCHARGE:    1. Decrease congestion and/or edema with diuretic therapy to achieve near      optimal volume status.            Dyspnea improved:  Yes            Edema improved:     Yes        Net I/O and Weights since admission:          03/27 0700 - 04/26 0659  In: 2450 [P.O.:2150; I.V.:300]  Out: 9770 [Urine:9770]  Net: -7320            Vitals:    04/23/20 0314 04/23/20 0627 04/24/20 0522 04/25/20 0200   Weight: 77.1 kg (170 lb) 76.9 kg (169 lb 8 oz) 74.8 kg (164 lb 14.4 oz) 73.4 kg (161 lb 12.8 oz)    04/26/20 0544   Weight: 72.8 kg (160 lb 8 oz)       2.  O2 sats > 92% on RA or at prior home O2 therapy level.          Current oxygenation status:       SpO2: 94 %         O2 Device: None (Room air)(Place on O2, 2 L/NC, per pt request),  Oxygen Delivery: 1 LPM         Able to wean O2 this shift to keep sats > 92%:  Pt SPO2 mid to high 90's on room air but pt requested 2 L/NC overnight for comfort.        Does patient use Home O2? No    3.  Tolerates ambulation and mobility near baseline: Yes        How many times did the patient ambulate with nursing staff this shift? 2 times to bathroom.    Please review the Heart Failure Care Pathway for additional HF goal outcomes.    GENOVEVA PARMAR, RN RN  4/26/2020

## 2020-04-26 NOTE — PLAN OF CARE
PT:  Patient continues to mobilize with nursing with SBA, continued plan to meet with TAVR team on Monday. Will hold therapy today and wait for POC to be established.     Discussed with RN who states MD wishing to have baseline assessment of patient and initiate mobility while waiting for TAVR team on Monday.

## 2020-04-26 NOTE — PLAN OF CARE
Discharge Planner PT   Patient plan for discharge: Not stated  Current status: Evaluation completed and treatment initiated. Patient admitted on 4/23/2020 for SOB, found to have acute on chronic diastolic HF exacerbation and severe aortic stenosis. TAVR workup pending with TAVR team on Monday. MD wishing for therapy to work with patient prior to work up to gather baseline level of function. Patient lives in a house with his 2 sons with 2 steps to enter, reports independence at baseline. Patient is [place] but does well if speaking into R ear.     Patient completing bed mobility and transfers independently during session. Use of restroom independently. Gait for 120 feet with IV pole for support and mod I, SpO2 monitored throughout session. On RA at rest, 92%; with gait 92-94% on RA, and after gait 92%. Patient denies SOB with activity. All needs in reach and patient in supine at end of session.    Barriers to return to prior living situation: Anticipate none   Recommendations for discharge: Home with A for household tasks as needed  Rationale for recommendations: TAVR workup pending, however based off initial assessment patient likely will be safe to discharge home with A from sons for household tasks as needed. Will update recommendations as appropriate/needed.        Entered by: Ileana Gupta 04/26/2020 11:12 AM

## 2020-04-26 NOTE — PROGRESS NOTES
[facility]    HOSPITALIST PROGRESS NOTE :   --------------------------------------------------    Date of Admission:  4/23/2020    Cumulative Summary: Shiraz Ingram is a 85 year old male with past medical history significant for chronic kidney disease stage III, COPD, coronary artery disease a status post CABG in 2002, hyperlipidemia, essential hypertension, atrial fibrillation on anticoagulation with Xarelto, severe aortic valve calcification whose TAVR procedure was postponed due to COVID-19.  Patient now presented to ER with acute hypoxic respiratory failure which was thought to be mostly secondary to CHF exacerbation from his valvular disease with possible mild COPD exacerbation.  Patient was admitted for further evaluation and management, COVID-19 infection was ruled out. Cardiology was also consulted.    Assessment & Plan     Active Problems:  Acute hypoxic respiratory failure: Secondary to combination of severe aortic valve calcification and reactive airway disease exacerbation.  Improving, patient is able to get weaned off from oxygen.  Severe aortic valve calcification: Patient was planned to undergo TAVR as an outpatient but unfortunately his procedure was postponed due to COVID-19.  Now patient is presenting with acute exacerbation of diastolic heart failure secondary to his severe underlying aortic stenosis with valve area of 0.5 cm and mean gradient of 52 mmHg.  Acute exacerbation of diastolic heart failure: Last echocardiogram done in January showed global and regional LV function to be normal range with ejection fraction of 55 to 60%.  Mild concentric wall thickening consistent with left ventricular hypertrophy.  Diastolic function was not assessed due to atrial fibrillation.  ED angiogram for TAVR done on February 4 showed no acute aortic pathology like dissection, intramural hematoma or contained rupture.  Cardiology took patient for ALINE and coronary angiogram yesterday, patient  tolerated the procedure well.  Coronary artery disease status post CABG: With LIMA graft placed to LAD and vein grafts to OM and RCA.  Persistent, permanent atrial fibrillation: Anticoagulation with Xarelto.  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  --Patient underwent ALINE and was found to have normal LV systolic function with ejection fraction of 55 to 60%, very dense spontaneous echo contrast seen in the left atrial appendage which appears early/pre-clot in appearance although no clear-cut thrombus was noted, once again patient was seen to have severe valvular aortic stenosis.  --Also underwent cardiac catheterization for TAVR work-up on Friday and was found to have severe three-vessel coronary artery disease  --Continue patient on Lasix 40 mg IV twice daily ( tolerating better this dose , Creatinine is improved slightly today )   -- Check BMP tomorrow   --Continue patient on lisinopril 20 mg p.o. daily.  --Continue patient on metoprolol tartrate 25 mg p.o. twice daily.  --Continue patient on simvastatin 40 mg at bedtime.  --We will continue to hold patient's Xarelto , Cards started patient on heparin infusion for recent history of clot .  --Continue with strict intake and output.  --Continue daily weight.    Moderate persistent asthma with exacerbation: Per chart review, it seems like patient has been following up with pulmonary almost 4 times a year.  His home medications include albuterol inhaler 4 to 6 hours as needed, Pulmicort nebulizer solution 2 times daily, DuoNeb 4 times a day, Singulair 10 mg p.o. daily.  Patient was noticed to be in exacerbation on admission and was a started on IV Solu-Medrol 60 mg twice daily.  Patient is weaned down to room air today and his wheezing is  significantly improved.    --Continue to monitor patient closely.  --Continue to wean patient off oxygen.  --We will discontinue IV Solu-Medrol and will order oral prednisone 40 mg p.o. daily for next 5 days.  --Continue patient on Singulair 10 mg p.o. daily.  --Continue patient on Pulmicort nebulizer solution 0.5 mg twice daily.  --Continue patient on DuoNeb 4 times daily which would be helpful to optimize his asthma care before his TAVR surgery.  --Continue duo nebs every 4 hours as needed if patient needs additional DuoNeb treatment.    Pulmonary nodules: Patient has been following up with Dr. Morris, was last seen by him on March 5.  In 2008 CT chest noncontrast revealed scattered noncalcified pulmonary nodules.  CT angiogram for TAVR in February 20 revealed nonspecific mediastinal and right hilar lymphadenopathy, scattered bilateral lung nodules measuring up to 6 mm and multiple renal masses concerning for malignancy.  Patient underwent CT chest abdomen and pelvis which revealed bilateral lung nodules which have been present since 2004 and they have minimally increased in size.  They are considered benign as they are over there for more than 15 years and the size is just minimally increased.  There are multiple renal simple and complex hemorrhagic cyst which are not suspicious for malignancy.    -- Continue to monitor outpatient as per protocol    Senile hearing loss  -- communication via sign language, patient also has hearing aid.    Acute renal failure with underlying Chronic kidney disease, stage III: Patient with a creatinine of 1.44 on admission.  Most likely secondary to being on diuretic.  -- continue patient on decreased dose of Lasix to 40 mg IV twice daily, recheck BMP tomorrow  --Avoid nephrotoxins.  --Monitor.     Anemia, thrombocytopenia: Patient with hemoglobin of 12.3 on admission with a platelet count of 103.  MCV is low at 66.  No report of external blood loss.  --Monitor.    Diet: Combination  Diet Low Saturated Fat Na <2400mg Diet, No Caffeine Diet    Pak Catheter: not present  DVT Prophylaxis: Xarelto  Code Status: Full Code    The patient's care was discussed with the Bedside Nurse and Patient.    Disposition Plan   Expected discharge: Expecting patient to stay inpatient over the weekend, tentative TAVR next week.  he will benefit from physical and occupational therapy evaluation after the procedure.    Bozena Groves MD, [facility]  Text Page (7am - 6pm)    ----------------------------------------------------------------------------------------------------------------------      Interval History    Patient was seen and examined, denying any complaints, remains hard of hearing, bedside RN also present, denying any discomfort, discussed with him regarding angiogram and ALINE results, he is aware that he will be evaluated by TAVR surgery team on Monday.    -Data reviewed today: I reviewed all new labs and imaging results over the last 24 hours.    I personally reviewed no images or EKG's today.    Physical Exam   Temp: 98.4  F (36.9  C) Temp src: Oral BP: 130/73 Pulse: 65 Heart Rate: 71 Resp: 16 SpO2: 98 % O2 Device: Nasal cannula Oxygen Delivery: 1 LPM  Vitals:    04/24/20 0522 04/25/20 0200 04/26/20 0544   Weight: 74.8 kg (164 lb 14.4 oz) 73.4 kg (161 lb 12.8 oz) 72.8 kg (160 lb 8 oz)     Vital Signs with Ranges  Temp:  [98.1  F (36.7  C)-98.6  F (37  C)] 98.4  F (36.9  C)  Pulse:  [65] 65  Heart Rate:  [64-83] 71  Resp:  [16] 16  BP: (124-147)/(67-78) 130/73  SpO2:  [93 %-98 %] 98 %  I/O last 3 completed shifts:  In: 1440 [P.O.:1440]  Out: 3075 [Urine:3075]    GENERAL: Alert , awake and oriented. NAD. Conversational, appropriate. [place]  HEENT: Normocephalic. EOMI. No icterus or injection. Nares normal.   LUNGS: Bilateral end expiratory wheezing, no use of accessory muscle.  HEART: Irregular rhythm, systolic murmur heard all over the precordium, perfused extremities fair  ABDOMEN: Soft, nontender, and  nondistended. Positive bowel sounds.   EXTREMITIES: 1+ bilateral lower extremity edema  NEUROLOGIC: Moves extremities x4 on command. No acute focal neurologic abnormalities noted.     Medications     HEParin 850 Units/hr (04/26/20 0853)     - MEDICATION INSTRUCTIONS -         aspirin  325 mg Oral Once     budesonide  0.5 mg Nebulization BID     furosemide  40 mg Intravenous BID     ipratropium - albuterol 0.5 mg/2.5 mg/3 mL  3 mL Nebulization 4x daily     lisinopril  20 mg Oral Daily     metoprolol tartrate  25 mg Oral BID     montelukast  10 mg Oral Daily     predniSONE  40 mg Oral Daily     simvastatin  40 mg Oral At Bedtime     sodium chloride (PF)  3 mL Intracatheter Q8H       Data   Recent Labs   Lab 04/26/20  0550 04/25/20  1636 04/25/20  0641 04/24/20  0617 04/23/20  1130 04/23/20  0653 04/23/20  0325   WBC  --  6.7  --  5.7  --   --  5.0   HGB  --  11.8*  --  11.3*  --   --  12.3*   MCV  --  64*  --  66*  --   --  66*   PLT  --  114*  --  104*  --   --  103*   INR  --   --   --  1.24*  --   --  2.35*     --  134 135  --   --  140   POTASSIUM 4.4  --  4.6 5.0  --   --  4.3   CHLORIDE 98  --  97 101  --   --  108   CO2 34*  --  35* 32  --   --  26   BUN 48*  --  46* 39*  --   --  28   CR 1.62*  --  1.75* 1.74*  --   --  1.44*   ANIONGAP 4  --  2* 2*  --   --  6   AYALA 9.1  --  8.9 8.9  --   --  9.1   *  --  120* 128*  --   --  124*   ALBUMIN  --   --   --   --   --   --  3.8   PROTTOTAL  --   --   --   --   --   --  7.9   BILITOTAL  --   --   --   --   --   --  1.0   ALKPHOS  --   --   --   --   --   --  70   ALT  --   --   --   --   --   --  32   AST  --   --   --   --   --   --  27   TROPI  --   --   --   --  0.015 0.017 0.018       Imaging:   No results found for this or any previous visit (from the past 24 hour(s)).

## 2020-04-27 ENCOUNTER — APPOINTMENT (OUTPATIENT)
Dept: CARDIOLOGY | Facility: CLINIC | Age: 85
DRG: 286 | End: 2020-04-27
Attending: INTERNAL MEDICINE
Payer: COMMERCIAL

## 2020-04-27 LAB
ANION GAP SERPL CALCULATED.3IONS-SCNC: 6 MMOL/L (ref 3–14)
BUN SERPL-MCNC: 50 MG/DL (ref 7–30)
CALCIUM SERPL-MCNC: 9.5 MG/DL (ref 8.5–10.1)
CHLORIDE SERPL-SCNC: 95 MMOL/L (ref 94–109)
CO2 SERPL-SCNC: 35 MMOL/L (ref 20–32)
CREAT SERPL-MCNC: 1.59 MG/DL (ref 0.66–1.25)
GFR SERPL CREATININE-BSD FRML MDRD: 39 ML/MIN/{1.73_M2}
GLUCOSE SERPL-MCNC: 83 MG/DL (ref 70–99)
LMWH PPP CHRO-ACNC: 0.31 IU/ML
POTASSIUM SERPL-SCNC: 4.2 MMOL/L (ref 3.4–5.3)
SODIUM SERPL-SCNC: 136 MMOL/L (ref 133–144)

## 2020-04-27 PROCEDURE — 93325 DOPPLER ECHO COLOR FLOW MAPG: CPT | Mod: 26 | Performed by: INTERNAL MEDICINE

## 2020-04-27 PROCEDURE — 25000132 ZZH RX MED GY IP 250 OP 250 PS 637: Performed by: PHYSICIAN ASSISTANT

## 2020-04-27 PROCEDURE — 25000132 ZZH RX MED GY IP 250 OP 250 PS 637: Performed by: INTERNAL MEDICINE

## 2020-04-27 PROCEDURE — 93308 TTE F-UP OR LMTD: CPT

## 2020-04-27 PROCEDURE — 36415 COLL VENOUS BLD VENIPUNCTURE: CPT | Performed by: INTERNAL MEDICINE

## 2020-04-27 PROCEDURE — 25000125 ZZHC RX 250: Performed by: INTERNAL MEDICINE

## 2020-04-27 PROCEDURE — 94640 AIRWAY INHALATION TREATMENT: CPT | Mod: 76

## 2020-04-27 PROCEDURE — 85520 HEPARIN ASSAY: CPT | Performed by: INTERNAL MEDICINE

## 2020-04-27 PROCEDURE — 93321 DOPPLER ECHO F-UP/LMTD STD: CPT | Mod: 26 | Performed by: INTERNAL MEDICINE

## 2020-04-27 PROCEDURE — 80048 BASIC METABOLIC PNL TOTAL CA: CPT | Performed by: INTERNAL MEDICINE

## 2020-04-27 PROCEDURE — 21000001 ZZH R&B HEART CARE

## 2020-04-27 PROCEDURE — 99233 SBSQ HOSP IP/OBS HIGH 50: CPT | Mod: 25 | Performed by: INTERNAL MEDICINE

## 2020-04-27 PROCEDURE — 99232 SBSQ HOSP IP/OBS MODERATE 35: CPT | Performed by: HOSPITALIST

## 2020-04-27 PROCEDURE — 40000275 ZZH STATISTIC RCP TIME EA 10 MIN

## 2020-04-27 PROCEDURE — 25000131 ZZH RX MED GY IP 250 OP 636 PS 637: Performed by: INTERNAL MEDICINE

## 2020-04-27 PROCEDURE — 25000128 H RX IP 250 OP 636: Performed by: INTERNAL MEDICINE

## 2020-04-27 PROCEDURE — 94640 AIRWAY INHALATION TREATMENT: CPT

## 2020-04-27 PROCEDURE — 93308 TTE F-UP OR LMTD: CPT | Mod: 26 | Performed by: INTERNAL MEDICINE

## 2020-04-27 RX ORDER — DABIGATRAN ETEXILATE 150 MG/1
150 CAPSULE ORAL 2 TIMES DAILY
Status: DISCONTINUED | OUTPATIENT
Start: 2020-04-27 | End: 2020-04-28 | Stop reason: HOSPADM

## 2020-04-27 RX ORDER — FUROSEMIDE 40 MG
40 TABLET ORAL DAILY
Status: DISCONTINUED | OUTPATIENT
Start: 2020-04-27 | End: 2020-04-28 | Stop reason: HOSPADM

## 2020-04-27 RX ORDER — DIPHENHYDRAMINE HCL 25 MG
25 CAPSULE ORAL ONCE
Status: CANCELLED | OUTPATIENT
Start: 2020-04-27 | End: 2020-04-27

## 2020-04-27 RX ORDER — POTASSIUM CHLORIDE 1500 MG/1
20 TABLET, EXTENDED RELEASE ORAL
Status: CANCELLED | OUTPATIENT
Start: 2020-04-27

## 2020-04-27 RX ORDER — HEPARIN SODIUM 10000 [USP'U]/100ML
850 INJECTION, SOLUTION INTRAVENOUS CONTINUOUS
Status: ACTIVE | OUTPATIENT
Start: 2020-04-27 | End: 2020-04-27

## 2020-04-27 RX ORDER — ASPIRIN 81 MG/1
81 TABLET ORAL DAILY
Status: DISCONTINUED | OUTPATIENT
Start: 2020-04-28 | End: 2020-04-28 | Stop reason: HOSPADM

## 2020-04-27 RX ADMIN — IPRATROPIUM BROMIDE AND ALBUTEROL SULFATE 3 ML: .5; 3 SOLUTION RESPIRATORY (INHALATION) at 14:59

## 2020-04-27 RX ADMIN — LISINOPRIL 20 MG: 20 TABLET ORAL at 09:55

## 2020-04-27 RX ADMIN — FUROSEMIDE 40 MG: 40 TABLET ORAL at 15:31

## 2020-04-27 RX ADMIN — FUROSEMIDE 40 MG: 10 INJECTION, SOLUTION INTRAMUSCULAR; INTRAVENOUS at 09:55

## 2020-04-27 RX ADMIN — IPRATROPIUM BROMIDE AND ALBUTEROL SULFATE 3 ML: .5; 3 SOLUTION RESPIRATORY (INHALATION) at 07:54

## 2020-04-27 RX ADMIN — MONTELUKAST 10 MG: 10 TABLET, FILM COATED ORAL at 09:55

## 2020-04-27 RX ADMIN — IPRATROPIUM BROMIDE AND ALBUTEROL SULFATE 3 ML: .5; 3 SOLUTION RESPIRATORY (INHALATION) at 22:28

## 2020-04-27 RX ADMIN — DABIGATRAN ETEXILATE MESYLATE 150 MG: 150 CAPSULE ORAL at 20:33

## 2020-04-27 RX ADMIN — BUDESONIDE 0.5 MG: 0.5 INHALANT RESPIRATORY (INHALATION) at 07:57

## 2020-04-27 RX ADMIN — PREDNISONE 40 MG: 20 TABLET ORAL at 09:55

## 2020-04-27 RX ADMIN — METOPROLOL TARTRATE 25 MG: 25 TABLET ORAL at 20:32

## 2020-04-27 RX ADMIN — METOPROLOL TARTRATE 25 MG: 25 TABLET ORAL at 09:54

## 2020-04-27 RX ADMIN — BUDESONIDE 0.5 MG: 0.5 INHALANT RESPIRATORY (INHALATION) at 22:28

## 2020-04-27 RX ADMIN — SIMVASTATIN 40 MG: 40 TABLET, FILM COATED ORAL at 22:28

## 2020-04-27 ASSESSMENT — ACTIVITIES OF DAILY LIVING (ADL)
ADLS_ACUITY_SCORE: 14

## 2020-04-27 ASSESSMENT — MIFFLIN-ST. JEOR: SCORE: 1373.46

## 2020-04-27 NOTE — CONSULTS
CARDIAC SURGERY CONSULT NOTE    Consult Reason: aortic valve stenosis    HPI: 85 year old male admitted on 4/23 with dyspnea.     He has known severe aortic stenosis and was in the process of being evaluated for TAVR. Since admission, he has been aggressively diuresed, and is feeling better.    He largely affirms the history as noted. Our interview was conducted with  present on iPad.    All of his questions were answered. I additionally discussed his history and the plans for treatment with his son, Nicko, at his request. Nicko additionally had all of his questions answered.      A/P: Patient is a 85 year old male with symptomatic severe aortic valve stenosis, acute on chronic heart failure, prior CABG with patent grafts, acute on chronic renal disease, reactive airway disease on multiple home inhalers, chronic afib, recent history of MAYA clot, hypertension, and hyperlipidemia.    His STS PROM is around 10%. I think he is a poor candidate for redo open heart surgery, and his studies to date suggest that TAVR is feasible. This is likely to be the optimal therapy for his valve, both in terms of procedural risk and overall recovery. He is likely at increased risk for pacer given his existing conduction abnormalities.      I additionally had a kevin discussion with him, and his son as well, regarding the possible need for surgical bailout at <1%, but with an attendant mortality risk around 70% at least. I explained the morbidity among survivors to include dialysis, stroke, prolonged ventilation, and loss of independent living situation. Both he and his son stated that would not be in keeping with his wishes, and they would not want him to undergo emergency open heart surgery in the event of procedural complication.      Thank you for the opportunity to participate in the care of this patient.      Jovanni Cardoso MD         [facility]:  Past Medical History:   Diagnosis Date     Allergic rhinitis, cause  unspecified      Anemia, unspecified      Aortic stenosis, mild      Benign neoplasm of colon 1999    Ademonatous polyp     CKD (chronic kidney disease) stage 3, GFR 30-59 ml/min (H) 5/12/2011     Coronary atherosclerosis of unspecified type of vessel, native or graft     s/p CABG 2002     Hyperlipidemia LDL goal < 100      Hypertension goal BP (blood pressure) < 140/90      Localized osteoarthrosis not specified whether primary or secondary, lower leg     left knee     Moderate persistent asthma      Unspecified hearing loss          PSH:  Past Surgical History:   Procedure Laterality Date     C NONSPECIFIC PROCEDURE  5/02    Coronary artery bypass     CARDIAC SURGERY       HC COLONOSCOPY THRU STOMA W BIOPSY/CAUTERY TUMOR/POLYP/LESION  5/03    Dr. Dow, Q 5 years     HC COLONOSCOPY THRU STOMA W BIOPSY/CAUTERY TUMOR/POLYP/LESION  12/199    Dr. Dow, one adenomatous polyp     HC ESOPHAGOSCOPY, DIAGNOSTIC  5/03    Dr. Dow, lower esophageal ring & mild gastritis     HERNIORRHAPHY INGUINAL Right 9/26/2016    Procedure: HERNIORRHAPHY INGUINAL;  Surgeon: Alexis Alexandra MD;  Location: [facility]     LAPAROSCOPIC CHOLECYSTECTOMY  12/03    for biliary sludge         FH:  family history includes C.A.D. in his father; CABG in his son; Cancer in his mother; Cerebrovascular Disease in his son; Family History Negative in his child and sister; Heart Disease in his brother.        Allergies:  Allergies   Allergen Reactions     No Known Drug Allergies        Home Meds:  Medications Prior to Admission   Medication Sig Dispense Refill Last Dose     budesonide (PULMICORT) 0.5 MG/2ML nebulizer solution Take 2 mLs (0.5 mg) by nebulization 2 times daily 120 mL 0 4/22/2020 at Unknown time     calcium carbonate 600 mg-vitamin D 400 units (CALTRATE) 600-400 MG-UNIT per tablet Take 1 tablet by mouth daily   4/22/2020 at Unknown time     ferrous sulfate (IRON) 325 (65 Fe) MG tablet TAKE 1 TABLET(325 MG) BY MOUTH TWICE DAILY 180 tablet  0 4/22/2020 at am     ipratropium - albuterol 0.5 mg/2.5 mg/3 mL (DUONEB) 0.5-2.5 (3) MG/3ML nebulization Inhale 1 vial (3 mLs) into the lungs 4 times daily 360 mL 11 4/22/2020 at Unknown time     lisinopril (PRINIVIL/ZESTRIL) 20 MG tablet Take 1 tablet (20 mg) by mouth daily 90 tablet 1 4/22/2020 at Unknown time     metoprolol tartrate (LOPRESSOR) 50 MG tablet TAKE 1/2 TABLET(25 MG) BY MOUTH TWICE DAILY 90 tablet 3 4/22/2020 at Unknown time     montelukast (SINGULAIR) 10 MG tablet TAKE 1 TABLET(10 MG) BY MOUTH DAILY 90 tablet 1 4/22/2020 at am     Multiple Vitamins-Minerals (MULTIVITAMIN PO) Take 1 tablet by mouth daily   4/22/2020 at Unknown time     rivaroxaban ANTICOAGULANT (XARELTO ANTICOAGULANT) 20 MG TABS tablet Take 1 tablet (20 mg) by mouth daily (with dinner) 30 tablet 3 4/22/2020 at pm     simvastatin (ZOCOR) 40 MG tablet Take 1 tablet (40 mg) by mouth At Bedtime 90 tablet 3 4/22/2020 at Unknown time     albuterol (PROAIR HFA) 108 (90 Base) MCG/ACT inhaler INHALE 1 TO 2 PUFFS EVERY 4 TO 6 HOURS AS NEEDED 1 Inhaler 11        ROS: + as noted above    Physical Exam:  Temp:  [97.7  F (36.5  C)-98.7  F (37.1  C)] 98.7  F (37.1  C)  Pulse:  [68-73] 73  Heart Rate:  [62] 62  Resp:  [16-18] 18  BP: (116-150)/(67-73) 119/67  SpO2:  [95 %-98 %] 98 %    Gen: elderly male, appears quite frail, extremely Nikolai  Lungs: minimally labored breathing  CV: irregular on tele  Abd: non distended  Neuro: AOx3    Labs:  ABG No lab results found in last 7 days.  CBC  Recent Labs   Lab 04/25/20  1636 04/24/20  0617 04/23/20  0325   WBC 6.7 5.7 5.0   HGB 11.8* 11.3* 12.3*   * 104* 103*     BMP  Recent Labs   Lab 04/27/20  0646 04/26/20  0550 04/25/20  0641 04/24/20  0617    136 134 135   POTASSIUM 4.2 4.4 4.6 5.0   CHLORIDE 95 98 97 101   CO2 35* 34* 35* 32   BUN 50* 48* 46* 39*   CR 1.59* 1.62* 1.75* 1.74*   GLC 83 112* 120* 128*     LFT  Recent Labs   Lab 04/24/20  0617 04/23/20  0325   AST  --  27   ALT  --  32    ALKPHOS  --  70   BILITOTAL  --  1.0   ALBUMIN  --  3.8   INR 1.24* 2.35*     PancreasNo lab results found in last 7 days.    Imaging:    BILATERAL HEART CATH    Coronary Findings     Diagnostic   Dominance: Right   Left Main    Ost LM to Mid LAD lesion is 50% stenosed. The lesion is severely calcified.    Left Circumflex    Prox Cx lesion is 50% stenosed.    Right Coronary Artery    Ost RCA to Dist RCA lesion is 100% stenosed. The lesion is chronic total occlusion.    Right Ventricular Branch    Collaterals    RV Branch filled by collaterals from Dist LAD.       Right Posterior Descending Artery    RPDA-1 lesion is 90% stenosed.    RPDA-2 lesion is 70% stenosed.    Right Posterior Atrioventricular Artery    RPAV lesion is 90% stenosed.    LIMA Graft to Mid LAD    The graft was visualized by angiography and is moderate in size. The graft is angiographically normal.    Vein Graft to 1st Mrg    The graft was visualized by angiography and is large. The graft exhibits minimal luminal irregularities. The flow in the graft is reversed.    Vein Graft to RPDA    The graft was visualized by angiography and is moderate in size. The graft exhibits minimal luminal irregularities.    Insertion lesion is 90% stenosed.    Y-Graft Branch to RPAV    Intervention     No interventions have been documented.   Hemodynamics     Right Heart Pressures     Right sided filling pressures are moderately elevated.Left sided filling pressures are moderately elevated. Moderately elevated pulmonary artery hypertension.Normal cardiac output level.          ALINE    Interpretation Summary     Left ventricular systolic function is normal.The visual ejection fraction is  estimated at 55-60%.  The right ventricular systolic function is normal.  Very dense spontaneous echo contrast seen in left atrial eppendage, in some  views it appears early/pre clot in apperance although no clear cut thrombus  noted.  Severe valvular aortic stenosis.  There is mild  (1+) aortic regurgitation.  _____________________________________________________________________________  __        ALINE  I determined this patient to be an appropriate candidate for the planned  sedation and procedure and have reassessed the patient immediately prior to  sedation and procedure. Total sedation time: 46 minutes of continuous bedside  1:1 monitoring. Versed (2.5mg) was given intravenously. Fentanyl (50mcg) was  given intravenously. Consent to the procedure was obtained prior to sedation.  Prior to the exam, the oral cavity was checked and no overcrowding was noted.  The transesophageal probe insertion was technically difficult. There were no  complications associated with this procedure. Resistance met at 18 cm tomeka  despite moderate sedation- anesthesia unable to pass probe initially and  patient was then given deeper sedation by anesthesia and probe successfully  intubated by anesthesia.  During the procedure 3D imaging could not be obtained due to machine  malfunctioining.     Left Ventricle  Left ventricular systolic function is normal. The visual ejection fraction is  estimated at 55-60%.     Right Ventricle  The right ventricular systolic function is normal.     Atria  The left atrium is moderately dilated. Spontaneous contrast in left atrium.  The right atrium is moderately dilated. There is no color Doppler evidence of  an atrial shunt. Spontaneous contrast in left atrial appendage. Very dense  spontaneous echo contrast seen in left atrial eppendage, in some views it  appears early/pre clot in apperance although no clear cut thrombus noted.        Mitral Valve  There is trace mitral regurgitation.     Tricuspid Valve  There is mild (1+) tricuspid regurgitation.     Aortic Valve  There is mild (1+) aortic regurgitation. Severe valvular aortic stenosis. LVOT  diamter 2.1 cm.  Peak aortic valve velcoity 4.6 m/sec, mean gradient 48 mm hg, LAUREEN 0.65 cm2.     Pulmonic Valve  The pulmonic valve is not  well visualized.     Vessels  The aortic root is normal size. Mild atherosclerotic plaque(s) in the  descending aorta. Normal pulmonary vein velocity.        Pericardial/Pleural  There is no pericardial effusion.      TAVR CTA    FINDINGS:     1.  Tricuspid aortic valve. Aortic valve calcium score is 4435. The  ascending aorta is mild calcified, aortic arch is  moderate calcified,  the descending thoracic aorta is moderate calcified.   2.  The arch vessel branching pattern is normal.   3.  The suprarenal abdominal aorta is moderate calcified; infrarenal  abdominal aorta is severe calcified  4.  Widely patent origins of celiac trunk, superior mesenteric artery  and inferior mesenteric artery.  Single bilateral renal arteries with  widely patent origins.   5.  There is no acute aortic pathology, such as dissection, intramural  hematoma, or contained rupture.      MEASUREMENTS:   Representative dimensions of the thoracoabdominal aorta are as  follows:     1. AORTIC ANNULUS MEASUREMENTS:     *  The average aortic annulus diameter is 24.8 mm,   *  Aortic annulus area is 4.86 cm2  *  Aortic annulus perimeter is 80.9 mm  *  The 3 cusp coaxial angle for aortic valve is (Eritrean 0 , CAU 21) and  the AP coaxial angle is (Eritrean 10 , CAU 0 ), these  angles will vary  depending upon the patient's body position  *  Left main - Annulus distance: 16.2 mm, RCA - Annulus distance: 19.9  mm     2. LVOT MEASUREMENTS:     *  The average LVOT diameter is 25.8 mm  *  LVOT area is 5.25 cm2  *  LVOT perimeter is 84.3 mm  *  LVOT calcification mild mm     3. AORTA MEASUREMENTS     1.  The aortic root at the sinuses of Valsalva (L/R/N) 32.6 mm x  31.8  mm 18.9 mm  2.  Sinotubular junction height: 21.3 mm x 26.1 mm  3.  Proximal ascending aorta: 34.3 mm x 32 mm  4.  Distal abdominal aorta proximal to the bifurcation: 20.8 mm x 17  mm     4. ILIOFEMORAL MEASUREMENTS:     RIGHT:  1.  Right common iliac artery: 6.8 mm x 8.7 mm   2.  Right external  iliac artery: 8.88 mm x 8.8 mm   3.  Right common femoral artery: 7.72 mm x 7.03 mm   4.  Tortuosity: mild   5.  Calcification: moderate      LEFT:  1.  Left common iliac artery: 7.25 mm x  5.76 mm  2.  Left external iliac artery: 6.51 mm x 6.57 mm  3.  Left common femoral artery: 7.63 mm x 7.62 mm  4.  Tortuosity: mild   5.  Calcification: moderate      6.  Mitral Annular Calcification  mild to moderate     7. Aortic Root Angle 65 degrees     8. Innominate Artery :  10.7 mm X 7.4mm     9. Left Carotid Artery:  8.46mm X 6.39 mm     OTHER FINDINGS:   1.  There is severe coronary calcification.  2.  Please review radiology review for incidental non-cardiovascular  findings including lungs, abdominal organs, bone, soft tissue, nodes  to follow separately

## 2020-04-27 NOTE — PROGRESS NOTES
[facility]  Heart     contacted Pt per length of stay. Pt says his Taoist will be contacting and has no needs from  hospital staff at this time.     SH will remain available as needed.     Nayely More  Chaplain Resident

## 2020-04-27 NOTE — PROGRESS NOTES
[facility]  Hospitalist Progress Note   04/27/2020          Assessment and Plan:       Shiraz Ingram is a 85 year old male with medical history of chronic kidney disease stage III, COPD, coronary artery disease a status post CABG in 2002, hyperlipidemia, essential hypertension, atrial fibrillation on anticoagulation with Xarelto, severe aortic valve calcification whose TAVR procedure was postponed due to COVID-19.  Admitted on 4/23/2020 with shortness of breath.    Status post acute hypoxic respiratory failure: Secondary to severe aortic valve calcification and reactive airway disease exacerbation.  Acute exacerbation of diastolic heart failure:   Severe aortic valve calcification with severe AS:   Patient was supposed to undergo elective TAVR, unfortunately procedure postponed due to COVID pandemic.  Left atrial appendage clot, detected on CT scan for TAVR work-up in February 2020:  Echocardiogram 1/2020 with EF of 55 to 60%, diastolic function not assessed due to A. fib.  CT angiogram 2/2020 no acute aortic pathology like dissection, intramural hematoma or contained rupture.  Angiogram 4/24 severe three-vessel coronary artery disease.  ALINE 4/24 no clear-cut thrombus noted, severe valvular areas.  Coronary artery disease status post CABG: With LIMA graft placed to LAD and vein grafts to OM and RCA.  Persistent, permanent atrial fibrillation: Anticoagulation with Xarelto.  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  Continue on IV Lasix twice daily, continue diuresis per cardiology.  Continue lisinopril 20 mg p.o. daily.  Continue patient on metoprolol tartrate 25 mg p.o. twice daily.  Continue patient on simvastatin 40 mg at bedtime.  Continue to hold patient's Xarelto , Cards started patient on heparin infusion for recent history of clot .  Continue with strict intake and  output.  Continue daily weight.  TAVR team evaluation today with plan for possible TAVR during this hospital stay.  Appreciate cardiology, TAVR team comanagement.     Moderate persistent asthma with exacerbation:   Follows with pulmonology, PTA albuterol inhaler 4 to 6 hours as needed, Pulmicort nebulizer solution 2 times daily, DuoNeb 4 times a day, Singulair 10 mg p.o. daily.  Was on IV Solu-Medrol 60 mg twice daily, weaned off to oral prednisone 40 mg daily for next 5 days.  Continue patient on Singulair 10 mg p.o. daily.  Continue patient on Pulmicort nebulizer solution 0.5 mg twice daily.  Continue patient on DuoNeb 4 times daily, PRN.  Aggressive incentive spirometry.  Out of bed to chair as able to.    Acute on chronic kidney injury.  CKD stage III.  Baseline creatinine around 1.4, creatinine peaked to 1.75 with diuresis, trending down.  Monitor renal function closely while on diuresis, avoid nephrotoxic drugs.     Anemia, thrombocytopenia:   hemoglobin of 12.3 on admission with a platelet count of 103.  MCV is low at 66.    No report of external blood loss.  Iron studies, monitor hemoglobin levels.    Senile hearing loss  Communication via sign language, patient also has hearing aid.     Pulmonary nodules:   Follows with Dr. Morris from [facility], last seen 3/2020.  In 2008 CT chest noncontrast revealed scattered noncalcified pulmonary nodules.    CT angiogram for TAVR  2/2020 nonspecific mediastinal and right hilar lymphadenopathy, scattered bilateral lung nodules measuring up to 6 mm and multiple renal masses concerning for malignancy.  CT chest abdomen and pelvis which revealed bilateral lung nodules which have been present since 2004 and they have minimally increased in size.  They are considered benign as they are over there for more than 15 years and the size is just minimally increased.  There are multiple renal simple and complex hemorrhagic cyst which are not suspicious for  malignancy.  Continue to monitor outpatient as per protocol    Physical deconditioning from senile fragility, acute illness.  PT, OT assessment for discharge disposition.     Orders Placed This Encounter      Combination Diet Low Saturated Fat Na <2400mg Diet, No Caffeine Diet      DVT Prophylaxis: SCD, on IV heparin.  Code Status: Full Code  Disposition: Expected discharge to be decided pending clinical course.    Discussed with patient, bedside RN    Meme Corona MD        Interval History:      Patient lying in bed.  Hard of hearing,  services on use.    No acute events overnight.  Has been off nasal oxygen.    Shortness of breath improving.  No chest pain or palpitation.           Physical Exam:        Physical Exam   Temp:  [97.7  F (36.5  C)-98.7  F (37.1  C)] 98.1  F (36.7  C)  Pulse:  [68-73] 69  Heart Rate:  [62] 62  Resp:  [16-18] 16  BP: (116-150)/(67-75) 126/75  SpO2:  [94 %-98 %] 94 %    Intake/Output Summary (Last 24 hours) at 4/27/2020 1317  Last data filed at 4/27/2020 1200  Gross per 24 hour   Intake 1020 ml   Output 4175 ml   Net -3155 ml       Admission Weight: 77.1 kg (170 lb)  Current Weight: 71.4 kg (157 lb 6.4 oz)    PHYSICAL EXAM  GENERAL: Patient is in no distress. Alert and oriented.  HEART: irregular rate and rhythm. S1S2. No murmurs  LUNGS: Bilateral slightly decreased breath sounds, no wheezing no crackles.  Respirations unlabored  EXTREMITIES: trace pedal edema  SKIN: Warm, dry.   PSYCHIATRY Cooperative       Medications:          aspirin  325 mg Oral Once     budesonide  0.5 mg Nebulization BID     furosemide  40 mg Oral Daily     ipratropium - albuterol 0.5 mg/2.5 mg/3 mL  3 mL Nebulization 4x daily     lisinopril  20 mg Oral Daily     metoprolol tartrate  25 mg Oral BID     montelukast  10 mg Oral Daily     predniSONE  40 mg Oral Daily     simvastatin  40 mg Oral At Bedtime     sodium chloride (PF)  3 mL Intracatheter Q8H     acetaminophen, acetaminophen, albuterol,  bisacodyl, ipratropium - albuterol 0.5 mg/2.5 mg/3 mL, labetalol, lidocaine 4%, lidocaine (buffered or not buffered), melatonin, naloxone, ondansetron **OR** ondansetron, - MEDICATION INSTRUCTIONS -, polyethylene glycol, senna-docusate **OR** senna-docusate, sodium chloride (PF)         Data:      All new lab and imaging data was reviewed.

## 2020-04-27 NOTE — PROGRESS NOTES
"[facility] Cardiology Progress Note  Date of Service: 04/27/2020  Primary Cardiologist: Dr. Field    Shiraz Ingram is a 85 year old male admitted on 4/23/2020 with HFpEF.    Subjective: Breathing feels \"normal.\" Much-improved. Denies CP, near-syncope or syncope.     Assessment:  1.  Acute on chronic diastolic heart failure exacerbation, possibly secondary to dietary indiscretion - wedge of 30 on 4/24. Now net -11L with 12 lb wt loss on IV Lasix. [on no diuretics PTA]  2.  Severe aortic stenosis - aortic valve area 0.5 cm2, mean gradient 52 mmHg and transvalvular velocity 4.9 meters per second.   3.  Severe coronary artery disease s/p CABG [LIMA -LAD, VG-OM and the RCA].  Cath this adm shows moderate touchdown lesion of the right PDA branch of the [id number] Y-graft.  Patent LIMA to LAD, vein graft to the OM, and right posterior lateral Y graft branch.  4.  Persistent, likely permanent atrial fibrillation with slow ventricular response and pauses <3 sec on tele. Asymptomatic.   5.  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   6.  Left atrial appendage clot, detected on CT scan for TAVR workup 02/2020; appears to be improving. On heparin gtt.  7.  Hypertension, controlled.  8.  Stage II-III chronic renal failure. Creat ranging from 1.4-1.75 here - slowly improving with diuresis.    Plan:   1. Switch to oral furosemide 40 mg daily.   2. Plan for evaluation by TAVR team today (Dr. Samuel), with plan to possibly proceed with TAVR during this hospital stay.   3. Continue with heparin drip for now in place of Xarelto for left atrial appendage thrombus.  4. Cardiology will continue to follow along.    [facility]  Pager: [phone]  Text Page  (7:30am - 4pm M-F)   __________________________________________________________________________    Physical Exam   Temp: 98.7  F (37.1  C) Temp src: Oral BP: 119/67 Pulse: 73 Heart " Rate: 62 Resp: 18 SpO2: 98 % O2 Device: Nasal cannula Oxygen Delivery: 1 LPM  Vitals:    04/25/20 0200 04/26/20 0544 04/27/20 0417   Weight: 73.4 kg (161 lb 12.8 oz) 72.8 kg (160 lb 8 oz) 71.4 kg (157 lb 6.4 oz)       GENERAL:  The patient is in no apparent distress.   NECK: CVP appears normal, no masses or thyromegaly.  PULMONARY:  There is a normal respiratory effort. Clear lungs to auscultation bilaterally.   CARDIOVASCULAR:  RRR, normal S1 S2, grade 3/6 CATALINA loudest at the RUSB, radiating to carotids and across precordium.  GI:  Non tender abdomen with normoactive bowel sounds and no hepatosplenomegaly. There are no masses palpable.   EXTREMITIES:  No clubbing, cyanosis or edema.  VASCULAR: 2+ Pulses bilaterally in upper and lower extremities.    Medications     HEParin 850 Units/hr (04/27/20 0047)     - MEDICATION INSTRUCTIONS -         aspirin  325 mg Oral Once     budesonide  0.5 mg Nebulization BID     furosemide  40 mg Intravenous BID     ipratropium - albuterol 0.5 mg/2.5 mg/3 mL  3 mL Nebulization 4x daily     lisinopril  20 mg Oral Daily     metoprolol tartrate  25 mg Oral BID     montelukast  10 mg Oral Daily     predniSONE  40 mg Oral Daily     simvastatin  40 mg Oral At Bedtime     sodium chloride (PF)  3 mL Intracatheter Q8H       Data   Most Recent 3 CBC's:  Recent Labs   Lab Test 04/25/20  1636 04/24/20  0617 04/23/20  0325   WBC 6.7 5.7 5.0   HGB 11.8* 11.3* 12.3*   MCV 64* 66* 66*   * 104* 103*     Most Recent 3 BMP's:  Recent Labs   Lab Test 04/27/20  0646 04/26/20  0550 04/25/20  0641    136 134   POTASSIUM 4.2 4.4 4.6   CHLORIDE 95 98 97   CO2 35* 34* 35*   BUN 50* 48* 46*   CR 1.59* 1.62* 1.75*   ANIONGAP 6 4 2*   AYALA 9.5 9.1 8.9   GLC 83 112* 120*     Most Recent 3 Troponin's:  Recent Labs   Lab Test 04/23/20  1130 04/23/20  0653 04/23/20  0325  03/16/12  0739   TROPI 0.015 0.017 0.018   < >  --    TROPONIN  --   --   --   --  0.00    < > = values in this interval not displayed.      Most Recent 3 BNP's:  Recent Labs   Lab Test 04/23/20  0325 06/22/19  0346 01/29/17  1810   NTBNPI 8,790* 4,080* 4,211*

## 2020-04-27 NOTE — PLAN OF CARE
VSS, no c/o pain, breathing much better, started oral furosemide, will start pradaxa this evening, plan is to have TAVR in 6-8 weeks, and to discharge tomorrow.  Will continue to monitor closely.

## 2020-04-27 NOTE — PROGRESS NOTES
Structural Heart:    Clot visualized on ALINE in MAYA.  Recommend change to warfarin and plan TAVR with self expanding valve and ALINE in 6-8 wks.  Obtain echo today.

## 2020-04-28 ENCOUNTER — APPOINTMENT (OUTPATIENT)
Dept: PHYSICAL THERAPY | Facility: CLINIC | Age: 85
DRG: 286 | End: 2020-04-28
Payer: COMMERCIAL

## 2020-04-28 ENCOUNTER — CARE COORDINATION (OUTPATIENT)
Dept: CARDIOLOGY | Facility: CLINIC | Age: 85
End: 2020-04-28

## 2020-04-28 VITALS
HEIGHT: 68 IN | RESPIRATION RATE: 16 BRPM | HEART RATE: 75 BPM | TEMPERATURE: 96.6 F | WEIGHT: 156.3 LBS | BODY MASS INDEX: 23.69 KG/M2 | OXYGEN SATURATION: 95 % | SYSTOLIC BLOOD PRESSURE: 130 MMHG | DIASTOLIC BLOOD PRESSURE: 72 MMHG

## 2020-04-28 PROCEDURE — 40000275 ZZH STATISTIC RCP TIME EA 10 MIN

## 2020-04-28 PROCEDURE — 94640 AIRWAY INHALATION TREATMENT: CPT | Mod: 76

## 2020-04-28 PROCEDURE — 25000125 ZZHC RX 250: Performed by: INTERNAL MEDICINE

## 2020-04-28 PROCEDURE — 94640 AIRWAY INHALATION TREATMENT: CPT

## 2020-04-28 PROCEDURE — 99239 HOSP IP/OBS DSCHRG MGMT >30: CPT | Performed by: INTERNAL MEDICINE

## 2020-04-28 PROCEDURE — 25000131 ZZH RX MED GY IP 250 OP 636 PS 637: Performed by: INTERNAL MEDICINE

## 2020-04-28 PROCEDURE — 97116 GAIT TRAINING THERAPY: CPT | Mod: GP

## 2020-04-28 PROCEDURE — 97530 THERAPEUTIC ACTIVITIES: CPT | Mod: GP

## 2020-04-28 PROCEDURE — 25000132 ZZH RX MED GY IP 250 OP 250 PS 637: Performed by: INTERNAL MEDICINE

## 2020-04-28 PROCEDURE — 25000132 ZZH RX MED GY IP 250 OP 250 PS 637: Performed by: PHYSICIAN ASSISTANT

## 2020-04-28 RX ORDER — DABIGATRAN ETEXILATE 150 MG/1
150 CAPSULE ORAL 2 TIMES DAILY
Qty: 60 CAPSULE | Refills: 0 | Status: SHIPPED | OUTPATIENT
Start: 2020-04-28 | End: 2020-05-06

## 2020-04-28 RX ORDER — FUROSEMIDE 40 MG
40 TABLET ORAL DAILY
Qty: 30 TABLET | Refills: 0 | Status: SHIPPED | OUTPATIENT
Start: 2020-04-29 | End: 2020-05-05

## 2020-04-28 RX ADMIN — PREDNISONE 40 MG: 20 TABLET ORAL at 08:23

## 2020-04-28 RX ADMIN — IPRATROPIUM BROMIDE AND ALBUTEROL SULFATE 3 ML: .5; 3 SOLUTION RESPIRATORY (INHALATION) at 11:18

## 2020-04-28 RX ADMIN — IPRATROPIUM BROMIDE AND ALBUTEROL SULFATE 3 ML: .5; 3 SOLUTION RESPIRATORY (INHALATION) at 07:35

## 2020-04-28 RX ADMIN — FUROSEMIDE 40 MG: 40 TABLET ORAL at 08:23

## 2020-04-28 RX ADMIN — METOPROLOL TARTRATE 25 MG: 25 TABLET ORAL at 08:23

## 2020-04-28 RX ADMIN — LISINOPRIL 20 MG: 20 TABLET ORAL at 08:23

## 2020-04-28 RX ADMIN — BUDESONIDE 0.5 MG: 0.5 INHALANT RESPIRATORY (INHALATION) at 07:35

## 2020-04-28 RX ADMIN — MONTELUKAST 10 MG: 10 TABLET, FILM COATED ORAL at 08:23

## 2020-04-28 RX ADMIN — ASPIRIN 81 MG: 81 TABLET, DELAYED RELEASE ORAL at 08:23

## 2020-04-28 RX ADMIN — DABIGATRAN ETEXILATE MESYLATE 150 MG: 150 CAPSULE ORAL at 08:23

## 2020-04-28 ASSESSMENT — ACTIVITIES OF DAILY LIVING (ADL)
ADLS_ACUITY_SCORE: 14

## 2020-04-28 ASSESSMENT — MIFFLIN-ST. JEOR: SCORE: 1368.47

## 2020-04-28 NOTE — DISCHARGE SUMMARY
[facility]  Hospitalist Discharge Summary      Date of Admission:  4/23/2020  Date of Discharge:  4/28/2020  Discharging Provider: Manasa Wang MD      Discharge Diagnoses    Hospital Course     Shiraz Ingram is a 85 year old male with medical history of chronic kidney disease stage III, COPD, coronary artery disease a status post CABG in 2002, hyperlipidemia, essential hypertension, atrial fibrillation on anticoagulation with Xarelto, severe aortic valve calcification whose TAVR procedure was postponed due to COVID-19.  Admitted on 4/23/2020 with shortness of breath.     Status post acute hypoxic respiratory failure: Secondary to severe aortic valve calcification and reactive airway disease exacerbation.  Acute exacerbation of diastolic heart failure:   Severe aortic valve calcification with severe AS:   Patient was supposed to undergo elective TAVR, unfortunately procedure postponed due to COVID pandemic.  Left atrial appendage clot, detected on CT scan for TAVR work-up in February 2020:  Echocardiogram 1/2020 with EF of 55 to 60%, diastolic function not assessed due to A. fib.  CT angiogram 2/2020 no acute aortic pathology like dissection, intramural hematoma or contained rupture.  Angiogram 4/24 severe three-vessel coronary artery disease.  ALINE 4/24 no clear-cut thrombus noted, severe valvular areas.  Coronary artery disease status post CABG: With LIMA graft placed to LAD and vein grafts to OM and RCA.  Persistent, permanent atrial fibrillation: Anticoagulation with Xarelto.  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  Continue on IV Lasix twice daily, continue diuresis per cardiology.  Continue lisinopril 20 mg p.o. daily.  Continue patient on metoprolol tartrate 25 mg p.o. twice daily.  Continue patient on simvastatin 40 mg at bedtime.  Continue to hold patient's Xarelto ,  Cards started patient on heparin infusion for recent history of clot .  Continue with strict intake and output.  Continue daily weight.  TAVR team evaluation today with plan for possible TAVR during this hospital stay.  Appreciate cardiology, TAVR team comanagement.    Per Dr. Kesha Fontaine:  1.  I have reviewed all his pertinent imaging, CV surgery consult note, and discussed his care with Dr. Ryan Samuel from the Structural heart disease team who is planning on elective transcatheter aortic valve replacement in 6 to 8 weeks.  2.  Specifically, regarding the issue of change in anticoagulation for spontaneous echo contrast on recent ALINE (after being on Xarelto anticoagulation for about 6 weeks) - initially, Dr. Samuel read suggested warfarin anticoagulation.  However, that may not be so straightforward in this patient with profound hearing loss under the current COVID-19 pandemic situation.  He will not be able to have actual follow-up for INR monitoring, he is elderly and has significant comorbidities, and after stopping the Xarelto we would have to bridge him with heparin while his INR gets therapeutic.  In lieu of all of this, we will switch him from rivaroxaban (Xarelto) to Pradaxa as it targets a different step in the coagulation pathway.  He will also be on concomitant aspirin 81 mg for coexisting coronary artery disease.  Patient will have an intraprocedural ALINE on the day of his TAVR in 6 to 8 weeks.  3.  Continue oral furosemide 40 mg daily.  4.  From a cardiology perspective, patient may be discharged home today after anticoagulation switch (with pharmacy input).  Outpatient TAVR follow-up will be scheduled by the structural heart disease team.     Moderate persistent asthma with exacerbation:   Follows with pulmonology, PTA albuterol inhaler 4 to 6 hours as needed, Pulmicort nebulizer solution 2 times daily, DuoNeb 4 times a day, Singulair 10 mg p.o. daily.  Was on IV Solu-Medrol 60 mg twice  daily, weaned off to oral prednisone 40 mg daily for next 5 days.  Continue patient on Singulair 10 mg p.o. daily.  Continue patient on Pulmicort nebulizer solution 0.5 mg twice daily.  Continue patient on DuoNeb 4 times daily, PRN.  Aggressive incentive spirometry.  Out of bed to chair as able to.     Acute on chronic kidney injury.  CKD stage III.  Baseline creatinine around 1.4, creatinine peaked to 1.75 with diuresis, trending down.  Monitor renal function closely while on diuresis, avoid nephrotoxic drugs.     Anemia, thrombocytopenia:   hemoglobin of 12.3 on admission with a platelet count of 103.  MCV is low at 66.    No report of external blood loss.  Iron studies, monitor hemoglobin levels.     Senile hearing loss  Communication via sign language, patient also has hearing aid.     Pulmonary nodules:   Follows with Dr. Morris from [facility], last seen 3/2020.  In 2008 CT chest noncontrast revealed scattered noncalcified pulmonary nodules.    CT angiogram for TAVR  2/2020 nonspecific mediastinal and right hilar lymphadenopathy, scattered bilateral lung nodules measuring up to 6 mm and multiple renal masses concerning for malignancy.  CT chest abdomen and pelvis which revealed bilateral lung nodules which have been present since 2004 and they have minimally increased in size.  They are considered benign as they are over there for more than 15 years and the size is just minimally increased.  There are multiple renal simple and complex hemorrhagic cyst which are not suspicious for malignancy.  Continue to monitor outpatient as per protocol     Physical deconditioning from senile fragility, acute illness.  PT, OT assessment for discharge disposition.      Follow-ups Needed After Discharge   Follow-up Appointments     Follow-up and recommended labs and tests       Follow up with primary care provider, Dany Rodriguez MD, within   7 days for hospital follow- up.  The following labs/tests are  "recommended:   BMP.             Unresulted Labs Ordered in the Past 30 Days of this Admission     No orders found from 3/24/2020 to 4/24/2020.          Discharge Disposition   Discharged to home  Condition at discharge: Stable        Consultations This Hospital Stay   CARDIOLOGY IP CONSULT  PHYSICAL THERAPY ADULT IP CONSULT  PHARMACY IP CONSULT  PHARMACY IP CONSULT  PHARMACY TO DOSE HEPARIN  CARDIOTHORACIC SURGERY ADULT IP CONSULT  PHYSICAL THERAPY ADULT IP CONSULT  OCCUPATIONAL THERAPY ADULT IP CONSULT  SMOKING CESSATION PROGRAM IP CONSULT    Code Status   Full Code    Time Spent on this Encounter   I, Manasa Wang MD, personally saw the patient today and spent greater than 30 minutes discharging this patient.       Manasa Wang MD  [facility]  ______________________________________________________________________    Physical Exam   Vital Signs: Temp: 96.6  F (35.9  C) Temp src: Axillary BP: (!) 140/73 Pulse: 75 Heart Rate: 59 Resp: 16 SpO2: 98 % O2 Device: None (Room air)    Weight: 156 lbs 4.8 oz  I saw and examined the patient on the date of discharge.       Primary Care Physician   Dany Rodriguez MD    Discharge Orders      Basic metabolic panel     Discharge Order: F/U with Cardiac  DIAZ      Reason for your hospital stay    You were short of breath due to fluid on the lungs (\"CHF\"), in part related to heart valve.  Arrangements have been made for you to have heart valve replaced (\"TAVR\") in the future.     Follow-up and recommended labs and tests     Follow up with primary care provider, Dany Rodriguez MD, within 7 days for hospital follow- up.  The following labs/tests are recommended: BMP.     Activity    Your activity upon discharge: ambulate in house     Full Code     Diet    Follow this diet upon discharge:   Low Saturated Fat Na <2400mg Diet       Significant Results and Procedures   Most Recent 3 CBC's:  Recent Labs   Lab Test 04/25/20  1636 04/24/20  0617 " 04/23/20  0325   WBC 6.7 5.7 5.0   HGB 11.8* 11.3* 12.3*   MCV 64* 66* 66*   * 104* 103*     Most Recent 3 BMP's:  Recent Labs   Lab Test 04/27/20  0646 04/26/20  0550 04/25/20  0641    136 134   POTASSIUM 4.2 4.4 4.6   CHLORIDE 95 98 97   CO2 35* 34* 35*   BUN 50* 48* 46*   CR 1.59* 1.62* 1.75*   ANIONGAP 6 4 2*   AYALA 9.5 9.1 8.9   GLC 83 112* 120*   ,   Results for orders placed or performed during the hospital encounter of 04/23/20   POC US CHEST B-SCAN    Impression    [facility] Procedure Note      Limited Bedside ED Ultrasound of Thorax:    PROCEDURE: PERFORMED BY: Dr. Shiraz Poe MD  INDICATIONS/SYMPTOM:  shortness of breath  PROBE: High frequency linear probe  BODY LOCATION: Chest  FINDINGS:  Images of both lung hemithoracies taken in 2D in multiple rib spaces        Right side:  Lung sliding artifact  Present     Comet tail artifacts  Present   Left side:  Lung sliding artifact  Present     Comet tail artifacts  Present   Hemothorax: Right side Absent     Left side Absent   Pleural effusion: Right side Present      Left side Present    INTERPRETATION: There is evidence of free fluid consistent with a Bilateral pleural effusion. B-lines present bilaterally, primarily in anterior lung fields.  IMAGE DOCUMENTATION: Images were archived to PACs system.       XR Chest Port 1 View    Narrative    EXAM: CHEST SINGLE VIEW PORTABLE  LOCATION: [facility]  DATE/TIME: 4/23/2020 3:32 AM    INDICATION: Shortness of breath.  COMPARISON: 8/12/2019.    FINDINGS: Mildly increased interstitial opacities in both lungs. Blunting of bilateral costophrenic angles. Possible mild cardiomegaly. Atherosclerotic calcification in the thoracic aorta. Prior median sternotomy. Mild elevation of the left   hemidiaphragm.      Impression    IMPRESSION:   1.  Mildly increased interstitial opacities in the lungs, most likely relating to interstitial pulmonary edema.  2.  Blunting of bilateral  costophrenic angles that could relate to tiny bilateral pleural effusions or scarring.    Transesophageal Echocardiogram    Narrative    [id number]  [id number]  [id number]^BAUDILIO^SANDRA^ALEKSANDR SERRANO           [facility]  Echocardiography Laboratory  [address] [place] [id number]        Name: SRIKANTH OBANDO  MRN: [id number]  : 1934  Study Date: 2020 08:39 AM  Age: 85 yrs  Gender: Male  Patient Location: [facility]  Reason For Study: Aortic Valve Disorder  Ordering Physician: SANDRA ASTUDILLO  Referring Physician: NATHAN ROME  Performed By: Alen Peres RDCS     BSA: 1.9 m2  Height: 68 in  Weight: 169 lb  HR: 81  BP: 139/71 mmHg  _____________________________________________________________________________  __        Procedure  Complete ALINE Adult. ALINE Probe serial #[id number] was used during the procedure.  The heart rate, respiratory rate and response to care were monitored  throughout the procedure with the assistance of the nurse.  _____________________________________________________________________________  __        Interpretation Summary     Left ventricular systolic function is normal.The visual ejection fraction is  estimated at 55-60%.  The right ventricular systolic function is normal.  Very dense spontaneous echo contrast seen in left atrial eppendage, in some  views it appears early/pre clot in apperance although no clear cut thrombus  noted.  Severe valvular aortic stenosis.  There is mild (1+) aortic regurgitation.  _____________________________________________________________________________  __        ALINE  I determined this patient to be an appropriate candidate for the planned  sedation and procedure and have reassessed the patient immediately prior to  sedation and procedure. Total sedation time: 46 minutes of continuous bedside  1:1 monitoring. Versed (2.5mg) was given intravenously. Fentanyl (50mcg) was  given intravenously. Consent to the procedure was obtained prior to  sedation.  Prior to the exam, the oral cavity was checked and no overcrowding was noted.  The transesophageal probe insertion was technically difficult. There were no  complications associated with this procedure. Resistance met at 18 cm tomeka  despite moderate sedation- anesthesia unable to pass probe initially and  patient was then given deeper sedation by anesthesia and probe successfully  intubated by anesthesia.  During the procedure 3D imaging could not be obtained due to machine  malfunctioining.     Left Ventricle  Left ventricular systolic function is normal. The visual ejection fraction is  estimated at 55-60%.     Right Ventricle  The right ventricular systolic function is normal.     Atria  The left atrium is moderately dilated. Spontaneous contrast in left atrium.  The right atrium is moderately dilated. There is no color Doppler evidence of  an atrial shunt. Spontaneous contrast in left atrial appendage. Very dense  spontaneous echo contrast seen in left atrial eppendage, in some views it  appears early/pre clot in apperance although no clear cut thrombus noted.        Mitral Valve  There is trace mitral regurgitation.     Tricuspid Valve  There is mild (1+) tricuspid regurgitation.     Aortic Valve  There is mild (1+) aortic regurgitation. Severe valvular aortic stenosis. LVOT  diamter 2.1 cm.  Peak aortic valve velcoity 4.6 m/sec, mean gradient 48 mm hg, LAUREEN 0.65 cm2.     Pulmonic Valve  The pulmonic valve is not well visualized.     Vessels  The aortic root is normal size. Mild atherosclerotic plaque(s) in the  descending aorta. Normal pulmonary vein velocity.        Pericardial/Pleural  There is no pericardial effusion.  _____________________________________________________________________________  __  MMode/2D Measurements & Calculations  LVOT diam: 2.1 cm  LVOT area: 3.5 cm2           Doppler Measurements & Calculations  Ao V2 max: 449.8 cm/sec  Ao max P.0 mmHg  Ao V2 mean: 323.9 cm/sec  Ao  mean P.1 mmHg  Ao V2 VTI: 123.8 cm  LAUREEN(I,D): 0.72 cm2  LAUREEN(V,D): 0.81 cm2  LV V1 max P.3 mmHg  LV V1 max: 103.9 cm/sec  LV V1 VTI: 25.4 cm  SV(LVOT): 89.2 ml  SI(LVOT): 46.9 ml/m2  AV Vishnu Ratio (DI): 0.23  LAUREEN Index (cm2/m2): 0.38           _____________________________________________________________________________  __           Report approved by: Billie Parr 2020 11:28 AM      Echocardiogram Limited    Narrative    [id number]  [id number]  [id number]  [id number]^JERSON^RANI^NOMRA           [facility]  Echocardiography Laboratory  [address] [place] [address]        Name: SRIKANTH OBANDO  MRN: [id number]  : 1934  Study Date: 2020 01:51 PM  Age: 85 yrs  Gender: Male  Patient Location: [facility]  Reason For Study: Murmur  Ordering Physician: RANI ARTHUR  Referring Physician: NATHAN ROME  Performed By: Jayna Meyer     BSA: 1.8 m2  Height: 68 in  Weight: 157 lb  HR: 70  BP: 126/75 mmHg  _____________________________________________________________________________  __        Procedure  Limited Portable Echo Adult.  _____________________________________________________________________________  __        Interpretation Summary     Left ventricular systolic function is low normal.  The visual ejection fraction is estimated at 50-55%.  There is moderate concentric left ventricular hypertrophy.  Critical aortic stenosis. Vmax 4.6 m/sec with mean gradient of 47 mmHg. DVI of  0.20 and LAUREEN by continuity is 0.51 cm2 by continuity equation.  Moderately decreased right ventricular systolic function  Dilation of the inferior vena cava is present with normal respiratory  variation in diameter.  There is no pericardial effusion.     Findings are similar to echocardiogram dated 2020. Visualized portions of  the aortic root and ascending aorta are smaller on today's study which may be  in setting of interstudy technical variability. If accurate sizing is  warranted  consider CT angiogram.  _____________________________________________________________________________  __        Left Ventricle  The left ventricle is normal in size. There is moderate concentric left  ventricular hypertrophy. The visual ejection fraction is estimated at 50-55%.  Diastolic function not assessed due to atrial fibrillation. Left ventricular  systolic function is low normal. Normal left ventricular wall motion.     Right Ventricle  The right ventricle is normal size. Moderately decreased right ventricular  systolic function.     Atria  The left atrium is severely dilated. The right atrium is severely dilated.     Mitral Valve  There is moderate mitral annular calcification. There is trace mitral  regurgitation.        Tricuspid Valve  The tricuspid valve is normal in structure and function. There is mild (1+)  tricuspid regurgitation. The right ventricular systolic pressure is  approximated at 20.4 mmHg plus the right atrial pressure. Right ventricular  systolic pressure is normal.     Aortic Valve  The aortic valve is normal in structure and function. There is mild (1+)  aortic regurgitation. Critical aortic stenosis. Vmax 4.6 m/sec with mean  gradient of 47 mmHg. DVI of 0.20 and LAUREEN by continuity is 0.51 cm2 by  continuity equation.     Pulmonic Valve  The pulmonic valve is normal in structure and function.     Vessels  The aortic root is normal size. Normal size ascending aorta. Dilation of the  inferior vena cava is present with normal respiratory variation in diameter.  IVC diameter and respiratory changes fall into an intermediate range  suggesting an RA pressure of 8 mmHg.     Pericardium  There is no pericardial effusion.     _____________________________________________________________________________  __  MMode/2D Measurements & Calculations  IVSd: 1.5 cm  LVIDd: 4.8 cm  LVIDs: 3.6 cm  LVPWd: 1.5 cm  FS: 25.9 %  LV mass(C)d: 291.8 grams  LV mass(C)dI: 158.3 grams/m2     asc Aorta Diam: 3.4  cm  LVOT diam: 2.0 cm  LVOT area: 3.1 cm2  RWT: 0.61        Doppler Measurements & Calculations  Ao V2 max: 477.0 cm/sec  Ao max P.0 mmHg  Ao V2 mean: 316.1 cm/sec  Ao mean P.1 mmHg  Ao V2 VTI: 113.6 cm  LAUREEN(I,D): 0.55 cm2  LAUREEN(V,D): 0.52 cm2  AI P1/2t: 597.9 msec  LV V1 max P.5 mmHg  LV V1 max: 78.3 cm/sec  LV V1 VTI: 20.0 cm  SV(LVOT): 62.9 ml  SI(LVOT): 34.1 ml/m2  TR max vishnu: 225.9 cm/sec  TR max P.4 mmHg  AV Vishnu Ratio (DI): 0.16  LAUREEN Index (cm2/m2): 0.30              _____________________________________________________________________________  __        Report approved by: Billie Gil 2020 04:00 PM      Cardiac Catheterization    Narrative    1.  Severe 3-vessel CAD as described below.  2.  Moderate-severe touchdown lesion of the rPDA branch of the   WXI-pGTY-aCL Y-graft.  Otherwise widely patent LIMA-LAD, SVG-OM, and rPL   Y-graft branch.  3.  Moderately elevated right heart pressures, moderately elevated PCWP,   and low-normal cardiac index by Sofi.       Discharge Medications   Current Discharge Medication List      START taking these medications    Details   aspirin (ASA) 81 MG EC tablet Take 1 tablet (81 mg) by mouth daily  Qty:      Associated Diagnoses: Coronary artery disease involving native coronary artery of native heart without angina pectoris      dabigatran ANTICOAGULANT (PRADAXA) 150 MG capsule Take 1 capsule (150 mg) by mouth 2 times daily Store in original 's bottle or blister pack; use within 120 days of opening.  Qty: 60 capsule, Refills: 0    Associated Diagnoses: Coronary artery disease involving native coronary artery of native heart without angina pectoris      furosemide (LASIX) 40 MG tablet Take 1 tablet (40 mg) by mouth daily  Qty: 30 tablet, Refills: 0    Associated Diagnoses: Acute on chronic congestive heart failure, unspecified heart failure type (H)         CONTINUE these medications which have NOT CHANGED    Details   budesonide  (PULMICORT) 0.5 MG/2ML nebulizer solution Take 2 mLs (0.5 mg) by nebulization 2 times daily  Qty: 120 mL, Refills: 0    Associated Diagnoses: Moderate persistent asthma      calcium carbonate 600 mg-vitamin D 400 units (CALTRATE) 600-400 MG-UNIT per tablet Take 1 tablet by mouth daily      ferrous sulfate (IRON) 325 (65 Fe) MG tablet TAKE 1 TABLET(325 MG) BY MOUTH TWICE DAILY  Qty: 180 tablet, Refills: 0    Associated Diagnoses: Anemia, unspecified type      ipratropium - albuterol 0.5 mg/2.5 mg/3 mL (DUONEB) 0.5-2.5 (3) MG/3ML nebulization Inhale 1 vial (3 mLs) into the lungs 4 times daily  Qty: 360 mL, Refills: 11    Associated Diagnoses: Moderate persistent asthma, uncomplicated      lisinopril (PRINIVIL/ZESTRIL) 20 MG tablet Take 1 tablet (20 mg) by mouth daily  Qty: 90 tablet, Refills: 1    Associated Diagnoses: Hypertension goal BP (blood pressure) < 140/90      metoprolol tartrate (LOPRESSOR) 50 MG tablet TAKE 1/2 TABLET(25 MG) BY MOUTH TWICE DAILY  Qty: 90 tablet, Refills: 3    Associated Diagnoses: Hypertension goal BP (blood pressure) < 140/90      montelukast (SINGULAIR) 10 MG tablet TAKE 1 TABLET(10 MG) BY MOUTH DAILY  Qty: 90 tablet, Refills: 1    Comments: **Patient requests 90 days supply**  Associated Diagnoses: Moderate persistent asthma      Multiple Vitamins-Minerals (MULTIVITAMIN PO) Take 1 tablet by mouth daily      simvastatin (ZOCOR) 40 MG tablet Take 1 tablet (40 mg) by mouth At Bedtime  Qty: 90 tablet, Refills: 3    Associated Diagnoses: Hyperlipidemia with target LDL less than 100      albuterol (PROAIR HFA) 108 (90 Base) MCG/ACT inhaler INHALE 1 TO 2 PUFFS EVERY 4 TO 6 HOURS AS NEEDED  Qty: 1 Inhaler, Refills: 11    Associated Diagnoses: Moderate persistent asthma, uncomplicated           Allergies   Allergies   Allergen Reactions     No Known Drug Allergies

## 2020-04-28 NOTE — PLAN OF CARE
[id number] Pt A/Ox4. VSS on RA. Mcgrath,  on IPAD. Denies SOB/CP/pain. Up independently in room. Voiding well in BR/urinal. Plan to discharge today.

## 2020-04-28 NOTE — PROGRESS NOTES
VSS, no c/o pain, IV d/c'd intact.  Radial and subclavian sites both stable.  AVS, med rec and follow up appts all discussed in depth with patient via J[employer]ber as well as this RN placed call to son Nicko and discussed as well.  All verbalized understanding of material, all questions and concerns addressed at this time.  Family did express concern regarding plan of care, seems cardiology did not update family, call placed to cardiology to have them call Nicko for update on TAVR plan.

## 2020-04-28 NOTE — PLAN OF CARE
Discharge Planner OT   Patient plan for discharge: return home  Current status: order received. Per discussion with PT, pt does not have current OT needs. Will sign off on OT.   Barriers to return to prior living situation: defer to PT  Recommendations for discharge: defer to PT  Rationale for recommendations: no acute OT needs indicated. Will complete orders.        Entered by: Sosa Ramirez 04/28/2020 9:53 AM

## 2020-04-28 NOTE — PLAN OF CARE
Discharge Planner PT   Patient plan for discharge: Home  Current status: Pt independent with bed mobility and transfers. Pt demonstrates ability to ambulate 150' x 2 without an AD independently without LOB. Pt up/down 10 steps with handrail modified independent. Pt with normal CV response to activity throughout session.  Barriers to return to prior living situation: None  Recommendations for discharge: Home  Rationale for recommendations: Pt independent with mobility and safe to discharge home from a mobility standpoint.       Entered by: Kylah Stiles 04/28/2020 10:06 AM      Physical Therapy Discharge Summary    Reason for therapy discharge:    Discharged to home.    Progress towards therapy goal(s). See goals on Care Plan in [facility] electronic health record for goal details.  Goals met    Therapy recommendation(s):    No further therapy is recommended.

## 2020-04-28 NOTE — DISCHARGE INSTRUCTIONS
Cardiac Angiogram Discharge Instructions - Neck & Radial    After you go home:      Have an adult stay with you until tomorrow.    Drink extra fluids for 2 days.    You may resume your normal diet.    No smoking       For 24 hours - due to the sedation you received:    Relax and take it easy.    Do NOT make any important or legal decisions.    Do NOT drive or operate machines at home or at work.    Do NOT drink alcohol.    Care of Neck & Wrist Puncture Sites:      For the first 24 hrs - check the puncture site every 1-2 hours while awake.    It is normal to have soreness at the puncture site and mild tingling in your hand for up to 3 days.    Remove the bandaid after 24 hours. If there is minor oozing, apply another bandaid and remove it after 12 hours.    You may shower tomorrow. Do NOT take a bath, or use a hot tub or pool for at least 3 days. Do NOT scrub the site. Do not use lotion or powder near the puncture site.           Activity - For 2 days:    Neck site:    Avoid heavy lifting or the overuse of your shoulder.        Wrist site:    do not use your hand or arm to support your weight (such as rising from a chair)     do not bend your wrist (such as lifting a garage door).    do not lift more than 5 pounds or exercise your arm (such as tennis, golf or bowling).    Do NOT do any heavy activity such as exercise, lifting, or straining.     Bleeding:     Neck site:    If you start bleeding from the site in your neck, sit down and press gently on the site for 10 minutes.     Once bleeding stops, sit still for 2 hours.     Call [facility] as soon as you can    Wrist site:    If you start bleeding from the site in your wrist, sit down and press firmly on/above the site for 10 minutes.     Once bleeding stops, keep arm still for 2 hours.     Call [facility] as soon as you can.    Call 911 right away if you have heavy bleeding or bleeding that does not stop.      Medicines:      If you are taking an antiplatelet  medication such as Plavix, Brilinta or Effient, do not stop taking it until you talk to your cardiologist.        If you are on Metformin (Glucophage), do not restart it until you have blood tests (within 2 to 3 days after discharge).  After you have your blood drawn, you may restart the Metformin.     Take your medications, including blood thinners, unless your provider tells you not to.  If you take Coumadin (Warfarin), have your INR checked by your provider in  3-5 days. Call your clinic to schedule this.    If you have stopped any medicines, check with your provider about when to restart them.    Follow Up Appointments:      Follow up with [facility] Nurse Practitioner at [facility] of patient preference in 7-10 days.    Call the clinic if:      You have increased pain or a large or growing hard lump around the site.    The site is red, swollen, hot or tender.    Blood or fluid is draining from the site.    You have chills or a fever greater than 101 F (38 C).    Your arm feels numb, cool or changes color.    You have hives, a rash or unusual itching.    Any questions or concerns.      [facility] [place]:    [phone] [facility] (7 days a week)

## 2020-04-28 NOTE — PLAN OF CARE
[id number]: A/O x 4. No complaints of pain. Tele afib. Heparin gtt stopped, pradaxa received. Lungs clear. Up ind. Plan to discharge tomorrow. Will continue to monitor.

## 2020-04-29 ENCOUNTER — TELEPHONE (OUTPATIENT)
Dept: CARDIOLOGY | Facility: CLINIC | Age: 85
End: 2020-04-29

## 2020-04-29 ENCOUNTER — CARE COORDINATION (OUTPATIENT)
Dept: CARDIOLOGY | Facility: CLINIC | Age: 85
End: 2020-04-29

## 2020-04-29 NOTE — PROGRESS NOTES
Dr. Samuel reviewed ALINE which shows MAYA clot.  He wanted patient to be changed from Xarelto to Coumadin, but due to COVID-19 and patient needing frequent visit for INR's it was decided by Dr. Wang and Dr. Fontaine to put him on Pradaxa as it targets a different step in the coagulation pathway.  He will also continue on ASA 81 mg daily.  I call patient's son to make sure he is aware of upcoming labs and video call with Amee Rocha on 5/5/20.  Hopefully we can do TAVR on 6/9/2020.  Will need ALINE intraop.

## 2020-04-29 NOTE — TELEPHONE ENCOUNTER
Patient was evaluated by cardiology while inpatient for SOB, acute on chronic HF secondary to dietary indiscretion and severe aortic stenosis. PMH: [place], CAD with CABG, chronic A. Fib. 4/24/20: RHC via RIJ and LHC via LRA showed moderate-severe touchdown lesion of the rPDA branch of the [id number] Y-graft. Otherwise widely patent LIMA-LAD, SVG-OM, and rPL Y-graft branch. Moderately elevated right heart pressures, moderately elevated PCWP, and low-normal cardiac index by Sofi. Pt is being worked up for possible TAVR/SAVR. Possible left atrial appendage clot. Initially detected on CT scan for TAVR work-up in February 2020. Started on rivaroxaban after that. On recent ALINE, there is dense spontaneous echo contrast in the left atrial appendage without definite clot- Xarelto changed to Pradaxa this admission. Plan is for pt to have TAVR in 6-8 weeks. Structural Heart Team is following. IV Lasix diuresed 12 lbs this admission and pt transitioned to po Lasix, Pradaxa and ASA at time of discharge. Writer attempted to call patient to discuss any post hospital d/c questions, review discharge medication, and confirm f/u appts, but pt is asleep, so I spoke with his son Nicko, whom pt is staying with. Son denied any questions regarding new or changes to PTA medications. Patient reportedly has had no c/o denied SOB, chest pain, edema, or light headedness. RIJ and LRA access sites are reportedly without signs of infection, swelling or drainage. RN confirmed with son that patient has a video visit apt scheduled with DIAZ Amee Rocha on 5/5/20 at 1430, with labs prior. Instructed son to have patient weigh himself every AM, after waking and using the restroom, but before breakfast and medications. Call clinic for a weight gain of 2 lbs overnight or 5 lbs in a week. Low Na+ diet encouraged. Son instructed to have pt's daily wt/BP diary and medications readily available for video visit. Son advised to call clinic with any cardiac  related questions or concerns prior to his appt, and he verbalized understanding and agreed with plan. JAVID Ding RN.

## 2020-05-01 DIAGNOSIS — I35.0 SEVERE AORTIC STENOSIS: Primary | ICD-10-CM

## 2020-05-04 ENCOUNTER — TELEPHONE (OUTPATIENT)
Dept: LAB | Facility: CLINIC | Age: 85
End: 2020-05-04

## 2020-05-04 NOTE — TELEPHONE ENCOUNTER
Wellness Screening Tool    Symptom Screening:    Do you have one of the following new symptoms:      Fever or reported chills?No    A new cough (started within the past 14 days)?  No    Shortness of breath (started within the past 14 days)?  No    Nausea, vomiting or diarrhea?  No    Within the past 3 weeks, have you been exposed to someone with a known positive illness below?      COVID - 19 (known or suspected)  no    Chicken pox?   no    Measles?no    Pertussis? No          Patient notified of visitor restriction: Yes   Patient informed to wear a mask:YES     Patient's appointment status: Patient will be seen in clinic as scheduled on 5/5/20

## 2020-05-05 ENCOUNTER — VIRTUAL VISIT (OUTPATIENT)
Dept: CARDIOLOGY | Facility: CLINIC | Age: 85
End: 2020-05-05
Attending: PHYSICIAN ASSISTANT
Payer: COMMERCIAL

## 2020-05-05 VITALS
SYSTOLIC BLOOD PRESSURE: 120 MMHG | HEIGHT: 66 IN | DIASTOLIC BLOOD PRESSURE: 60 MMHG | BODY MASS INDEX: 24.56 KG/M2 | HEART RATE: 81 BPM | WEIGHT: 152.8 LBS

## 2020-05-05 DIAGNOSIS — I50.32 CHRONIC HEART FAILURE WITH PRESERVED EJECTION FRACTION (HFPEF) (H): ICD-10-CM

## 2020-05-05 DIAGNOSIS — I50.9 ACUTE ON CHRONIC CONGESTIVE HEART FAILURE, UNSPECIFIED HEART FAILURE TYPE (H): ICD-10-CM

## 2020-05-05 DIAGNOSIS — I10 HYPERTENSION GOAL BP (BLOOD PRESSURE) < 140/90: ICD-10-CM

## 2020-05-05 LAB
ANION GAP SERPL CALCULATED.3IONS-SCNC: 13.4 MMOL/L (ref 6–17)
BUN SERPL-MCNC: 81 MG/DL (ref 7–30)
CALCIUM SERPL-MCNC: 9.6 MG/DL (ref 8.5–10.5)
CHLORIDE SERPL-SCNC: 95 MMOL/L (ref 98–107)
CO2 SERPL-SCNC: 29 MMOL/L (ref 23–29)
CREAT SERPL-MCNC: 2.34 MG/DL (ref 0.7–1.3)
GFR SERPL CREATININE-BSD FRML MDRD: 27 ML/MIN/{1.73_M2}
GLUCOSE SERPL-MCNC: 89 MG/DL (ref 70–105)
POTASSIUM SERPL-SCNC: 5.4 MMOL/L (ref 3.5–5.1)
SODIUM SERPL-SCNC: 132 MMOL/L (ref 136–145)

## 2020-05-05 PROCEDURE — 36415 COLL VENOUS BLD VENIPUNCTURE: CPT | Performed by: PHYSICIAN ASSISTANT

## 2020-05-05 PROCEDURE — 99214 OFFICE O/P EST MOD 30 MIN: CPT | Mod: 95 | Performed by: PHYSICIAN ASSISTANT

## 2020-05-05 PROCEDURE — 80048 BASIC METABOLIC PNL TOTAL CA: CPT | Performed by: PHYSICIAN ASSISTANT

## 2020-05-05 ASSESSMENT — MIFFLIN-ST. JEOR: SCORE: 1316.88

## 2020-05-05 NOTE — PROGRESS NOTES
"Shiraz Ingram is a 85 year old male who is being evaluated via a billable telephone visit.  This visit is being conducted as a virtual visit due to the emphasis on mitigation of the COVID-19 virus pandemic. The clinician has decided that the risk of an in-office visit outweighs the benefit for this patient.     The patient has been notified of following:     \"This telephone visit will be conducted via a call between you and your physician/provider. We have found that certain health care needs can be provided without the need for a physical exam.  This service lets us provide the care you need with a short phone conversation.  If a prescription is necessary we can send it directly to your pharmacy.  If lab work is needed we can place an order for that and you can then stop by our lab to have the test done at a later time.  Telephone visits are billed at different rates depending on your insurance coverage. During this emergency period, for some insurers they may be billed the same as an in-person visit.  Please reach out to your insurance provider with any questions.  If during the course of the call the physician/provider feels a telephone visit is not appropriate, you will not be charged for this service.\"    Patient has given verbal consent for Telephone visit? Yes    How would you like to obtain your AVS? Mail a copy    Bp:120/60  Pulse:81  Wt:152.8lb    Review Of Systems  Skin: NEGATIVE  Eyes:Ears/Nose/Throat: NEGATIVE  Respiratory: NEGATIVE  Cardiovascular:NEGATIVE  Gastrointestinal: NEGATIVE  Genitourinary:NEGATIVE   Musculoskeletal: NEGATIVE  Neurologic: NEGATIVE  Psychiatric: NEGATIVE  Hematologic/Lymphatic/Immunologic: NEGATIVE  Endocrine:  NEGATIVE    Cassi HANSEN    Shiraz Ingram presents for a hospital follow up visit.     I have reviewed and updated the patient's Past Medical History, Social History, Family History and Medication List.    ALLERGIES  No known drug allergies    MEDICATIONS  Current " Outpatient Medications   Medication Sig Dispense Refill     albuterol (PROAIR HFA) 108 (90 Base) MCG/ACT inhaler INHALE 1 TO 2 PUFFS EVERY 4 TO 6 HOURS AS NEEDED 1 Inhaler 11     aspirin (ASA) 81 MG EC tablet Take 1 tablet (81 mg) by mouth daily       budesonide (PULMICORT) 0.5 MG/2ML nebulizer solution Take 2 mLs (0.5 mg) by nebulization 2 times daily 120 mL 0     calcium carbonate 600 mg-vitamin D 400 units (CALTRATE) 600-400 MG-UNIT per tablet Take 1 tablet by mouth daily       dabigatran ANTICOAGULANT (PRADAXA) 150 MG capsule Take 1 capsule (150 mg) by mouth 2 times daily Store in original 's bottle or blister pack; use within 120 days of opening. 60 capsule 0     ferrous sulfate (IRON) 325 (65 Fe) MG tablet TAKE 1 TABLET(325 MG) BY MOUTH TWICE DAILY 180 tablet 0     furosemide (LASIX) 40 MG tablet Take 1 tablet (40 mg) by mouth daily 30 tablet 0     ipratropium - albuterol 0.5 mg/2.5 mg/3 mL (DUONEB) 0.5-2.5 (3) MG/3ML nebulization Inhale 1 vial (3 mLs) into the lungs 4 times daily 360 mL 11     lisinopril (PRINIVIL/ZESTRIL) 20 MG tablet Take 1 tablet (20 mg) by mouth daily 90 tablet 1     metoprolol tartrate (LOPRESSOR) 50 MG tablet TAKE 1/2 TABLET(25 MG) BY MOUTH TWICE DAILY 90 tablet 3     montelukast (SINGULAIR) 10 MG tablet TAKE 1 TABLET(10 MG) BY MOUTH DAILY 90 tablet 1     Multiple Vitamins-Minerals (MULTIVITAMIN PO) Take 1 tablet by mouth daily       simvastatin (ZOCOR) 40 MG tablet Take 1 tablet (40 mg) by mouth At Bedtime 90 tablet 3       HPI: (Please see Dr. Field's note from 2/10/20 for the patient's detailed history)  The patient has a pertinent cardiac history of the following -   1.  Recent admission for acute on chronic HFpEF, LVEF 55-60%, normal RV fn. Had excellent diuresis with 12 pound weight loss on IV Lasix as inpatient; transitioned to oral Lasix 40 mg daily. Was on no diuretics PTA.  2.  Severe aortic valve stenosis with valve area of 0.5 cm , mean gradient 52 mmHg, peak  transaortic velocity 5 m/s. This has undergone workup with the TAVR team / Dr. Samuel (/[place], ALINE performed 4/23) and plan is for TAVR (with intraop ALINE) in ~6 weeks (tentatively June 9th).  3.  History of stable CAD s/p CABG.  Angiography during recent hospitalization showed patent grafts.  4.  Persistent atrial fibrillation.  5.  Possible left atrial appendage clot.  Initially detected on CT scan for TAVR work-up in February 2020. Had recently been started on Xarelto for atrial fibrillation.  On recent ALINE, there is dense spontaneous echo contrast in the left atrial appendage without definite clot. He was switched to Pradaxa.    Today, we are conducting the visit with his son Nicko, who is interpreting to Shiraz with ASL and relaying Shiraz's answers. Weight has been declining slowly (154 lbs upon arriving home to 152 lbs today). BP's have been running in the upper 90's-low 100's/50's-60's. Labs today show significant RD / prerenal azotemia with hyponatremia and hyperkalemia. Again, he was not on diuretics PTA.  He actually feels very well overall, and denies chest pain, dyspnea, near-syncope, leg swelling, orthopnea. No issues with bruising or bleeding. Reports appetite is good. Drinking two 16 oz bottles of water per day. He manages his own medications and uses a pill box. His sons Nicko and Forrest both check in on him.     Component      Latest Ref Rng & Units 4/27/2020 5/5/2020   Sodium      136 - 145 mmol/L 136 132 (L)   Potassium      3.5 - 5.1 mmol/L 4.2 5.4 (H)   Chloride      98 - 107 mmol/L 95 95 (L)   Carbon Dioxide      23 - 29 mmol/L 35 (H) 29   Anion Gap      6 - 17 mmol/L 6 13.4   Glucose      70 - 105 mg/dL 83 89   Urea Nitrogen      7 - 30 mg/dL 50 (H) 81 (H)   Creatinine      0.70 - 1.30 mg/dL 1.59 (H) 2.34 (H)   GFR Estimate      >60 mL/min/1.73:m2 39 (L) 27 (L)   GFR Estimate If Black      >60 mL/min/1.73:m2 45 (L) 32 (L)   Calcium      8.5 - 10.5 mg/dL 9.5 9.6       Assessment/Plan:  1.  Acute on  chronic diastolic heart failure exacerbation, possibly secondary to dietary indiscretion - wedge of 30 on 4/24 at a weight of 165 lbs. Currently 152 and feeling well. However labs are suggestive of significant pre-renal azotemia with hyperkalemia, in the setting of dehydration, too-tightly controlled blood pressure, and severe aortic stenosis. Stop Lasix 40 and reduce lisinopril from 20 to 10 mg daily. Continue daily weights and BP's. Discussed when to call with fluctuations. Will arrange close follow-up.  2.  Severe aortic stenosis - LAUREEN 0.5 cm2, mean gradient 52 mmHg, Vmax 4.9. Awaiting TAVR (with intraop ALINE) in ~6 weeks (tentatively June 9th). Per TAVR team.  3.  Severe coronary artery disease s/p CABG [LIMA -LAD, VG-OM and the RCA].  Cath this adm shows moderate touchdown lesion of the right PDA branch of the [id number]-jVA Y-graft.  Patent LIMA to LAD, vein graft to the OM, and right posterior lateral Y graft branch.  4.  Persistent, likely permanent atrial fibrillation with slow ventricular response, asymptomatic.   5.  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   6.  Left atrial appendage clot, detected on CT scan for TAVR workup 2/2020, likely persistent but improved on recent ALINE. Compliant with Pradaxa.  7.  Hypertension, probably too strictly controlled in the setting of aortic stenosis.  8.  RD on CKD-III - creat 1.6 --> 2.3, pre-renal azotemia. Plan as above.     Follow-up:  - Telephone on Friday with me @ 10:50: [phone] (with other son, Forrest)  - BMP, Hgb Monday AM and telephone visit with me @ 3:10 (again with Forrest).  - Appropriate for CORE.    Phone call duration: 21 minutes    Amee Rocha PA-C  [facility]  Pager: [phone]  Text Page  (7:30am - 4pm M-F)

## 2020-05-05 NOTE — LETTER
5/5/2020      RE: Shiraz Ingram  [address]  [facility] [place] [facility] [address]       Dear Colleague,    Thank you for the opportunity to participate in the care of your patient, Shiraz Ingram, at the [facility] at [facility]. Please see a copy of my visit note below.    HPI: (Please see Dr. Field's note from 2/10/20 for the patient's detailed history)  The patient has a pertinent cardiac history of the following -   1.  Recent admission for acute on chronic HFpEF, LVEF 55-60%, normal RV fn. Had excellent diuresis with 12 pound weight loss on IV Lasix as inpatient; transitioned to oral Lasix 40 mg daily. Was on no diuretics PTA.  2.  Severe aortic valve stenosis with valve area of 0.5 cm , mean gradient 52 mmHg, peak transaortic velocity 5 m/s. This has undergone workup with the TAVR team / Dr. Samuel (/[facility], ALINE performed 4/23) and plan is for TAVR (with intraop ALINE) in ~6 weeks (tentatively June 9th).  3.  History of stable CAD s/p CABG.  Angiography during recent hospitalization showed patent grafts.  4.  Persistent atrial fibrillation.  5.  Possible left atrial appendage clot.  Initially detected on CT scan for TAVR work-up in February 2020. Had recently been started on Xarelto for atrial fibrillation.  On recent ALINE, there is dense spontaneous echo contrast in the left atrial appendage without definite clot. He was switched to Pradaxa.    Today, we are conducting the visit with his son Nicko, who is interpreting to Shiraz with ASL and relaying Shiraz's answers. Weight has been declining slowly (154 lbs upon arriving home to 152 lbs today). BP's have been running in the upper 90's-low 100's/50's-60's. Labs today show significant RD / prerenal azotemia with hyponatremia and hyperkalemia. Again, he was not on diuretics PTA.  He actually feels very well overall, and denies chest pain, dyspnea, near-syncope, leg swelling, orthopnea. No issues with  bruising or bleeding. Reports appetite is good. Drinking two 16 oz bottles of water per day. He manages his own medications and uses a pill box. His sons Nicko and Forrest both check in on him.     Component      Latest Ref Rng & Units 4/27/2020 5/5/2020   Sodium      136 - 145 mmol/L 136 132 (L)   Potassium      3.5 - 5.1 mmol/L 4.2 5.4 (H)   Chloride      98 - 107 mmol/L 95 95 (L)   Carbon Dioxide      23 - 29 mmol/L 35 (H) 29   Anion Gap      6 - 17 mmol/L 6 13.4   Glucose      70 - 105 mg/dL 83 89   Urea Nitrogen      7 - 30 mg/dL 50 (H) 81 (H)   Creatinine      0.70 - 1.30 mg/dL 1.59 (H) 2.34 (H)   GFR Estimate      >60 mL/min/1.73:m2 39 (L) 27 (L)   GFR Estimate If Black      >60 mL/min/1.73:m2 45 (L) 32 (L)   Calcium      8.5 - 10.5 mg/dL 9.5 9.6       Assessment/Plan:  1.  Acute on chronic diastolic heart failure exacerbation, possibly secondary to dietary indiscretion - wedge of 30 on 4/24 at a weight of 165 lbs. Currently 152 and feeling well. However labs are suggestive of significant pre-renal azotemia with hyperkalemia, in the setting of dehydration, too-tightly controlled blood pressure, and severe aortic stenosis. Stop Lasix 40 and reduce lisinopril from 20 to 10 mg daily. Continue daily weights and BP's. Discussed when to call with fluctuations. Will arrange close follow-up.  2.  Severe aortic stenosis - LAUREEN 0.5 cm2, mean gradient 52 mmHg, Vmax 4.9. Awaiting TAVR (with intraop ALINE) in ~6 weeks (tentatively June 9th). Per TAVR team.  3.  Severe coronary artery disease s/p CABG [LIMA -LAD, VG-OM and the RCA].  Cath this adm shows moderate touchdown lesion of the right PDA branch of the [id number] Y-graft.  Patent LIMA to LAD, vein graft to the OM, and right posterior lateral Y graft branch.  4.  Persistent, likely permanent atrial fibrillation with slow ventricular response, asymptomatic.   5.  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   6.  Left atrial  appendage clot, detected on CT scan for TAVR workup 2/2020, likely persistent but improved on recent ALINE. Compliant with Pradaxa.  7.  Hypertension, probably too strictly controlled in the setting of aortic stenosis.  8.  RD on CKD-III - creat 1.6 --> 2.3, pre-renal azotemia. Plan as above.     Follow-up:  - Telephone on Friday with me @ 10:50: [phone] (with other son, Forrest)  - BMP, Hgb Monday AM and telephone visit with me @ 3:10 (again with Forrest).  - Appropriate for CORE.    Please do not hesitate to contact me if you have any questions/concerns.     Sincerely,     Amee Rocha PA-C

## 2020-05-05 NOTE — PATIENT INSTRUCTIONS
Today, we discussed the following:   - Results: The kidneys look very dry and the blood pressure is too low.  - Medication changes:  Set aside furosemide (Lasix). Reduce lisinopril to 10 mg daily (cut in half).   - Follow up: Phone visit with me on Friday at 10:50a. Lab and telephone visit with me on Monday (Lab to be scheduled, visit at 3:10p).  Continue daily weights and blood pressures, and call my RN in the meantime with fluctuations as we discussed, development of difficulty breathing or swelling.     Please, remember to continue the followin.  Weigh yourself daily. Call if your weight is up > than 2 pounds in one day, or 5 pounds in one week; if you feel more short of breath or have worsening swelling in your legs or abdomen.  2.  Eat a low sodium diet (less than 2,000mg or 2g daily). If you eat less salt, you will retain less fluid.   3.  Avoid alcohol, as this can worsen heart failure.   4.  Avoid NSAIDs as able (For example, Ibuprofen / aleve / advil / naprosen / diclofenac).    Please call CORE nurse for any questions or concerns Mon-Fri 8am-4pm:   [phone]  For concerns after hours:   [phone] option 2   Scheduling phone number:   [phone]    Thank you for visiting with us today.   Amee Rocha PA-C  ______________________________________________________________

## 2020-05-06 ENCOUNTER — CARE COORDINATION (OUTPATIENT)
Dept: CARDIOLOGY | Facility: CLINIC | Age: 85
End: 2020-05-06

## 2020-05-06 DIAGNOSIS — I25.10 CORONARY ARTERY DISEASE INVOLVING NATIVE CORONARY ARTERY OF NATIVE HEART WITHOUT ANGINA PECTORIS: Primary | ICD-10-CM

## 2020-05-06 RX ORDER — LISINOPRIL 20 MG/1
10 TABLET ORAL DAILY
Qty: 90 TABLET | Refills: 1 | Status: ON HOLD | COMMUNITY
Start: 2020-05-05 | End: 2020-05-14

## 2020-05-06 RX ORDER — DABIGATRAN ETEXILATE 150 MG/1
150 CAPSULE ORAL 2 TIMES DAILY
Qty: 180 CAPSULE | Refills: 3 | Status: SHIPPED | OUTPATIENT
Start: 2020-05-06 | End: 2021-02-12

## 2020-05-06 RX ORDER — FUROSEMIDE 40 MG
TABLET ORAL
Qty: 30 TABLET | Refills: 0 | COMMUNITY
Start: 2020-05-05 | End: 2020-05-11

## 2020-05-06 NOTE — PROGRESS NOTES
[facility]    Incoming call from patients son Nicko requesting pradaxa refill. Per Epic review patient started on Pradaxa d/t MAYA clot. He is planned for TAVR w/intraop ALINE early June. Nicko confirmed patient taking 150mg bid.     Per Rx review original order on 4/28 for 60 tablets which should get patient to the end of this month. However Nicko stated patient only received 24 tablets. Rx sent to patients preferred pharmacy.  Erica Sam RN on 5/6/2020 at 9:42 AM

## 2020-05-08 ENCOUNTER — TELEPHONE (OUTPATIENT)
Dept: CARDIOLOGY | Facility: CLINIC | Age: 85
End: 2020-05-08

## 2020-05-08 ENCOUNTER — TELEPHONE (OUTPATIENT)
Dept: LAB | Facility: CLINIC | Age: 85
End: 2020-05-08

## 2020-05-08 ENCOUNTER — VIRTUAL VISIT (OUTPATIENT)
Dept: CARDIOLOGY | Facility: CLINIC | Age: 85
End: 2020-05-08
Attending: PHYSICIAN ASSISTANT
Payer: COMMERCIAL

## 2020-05-08 VITALS
SYSTOLIC BLOOD PRESSURE: 113 MMHG | HEART RATE: 55 BPM | HEIGHT: 66 IN | BODY MASS INDEX: 24.56 KG/M2 | WEIGHT: 152.8 LBS | DIASTOLIC BLOOD PRESSURE: 58 MMHG

## 2020-05-08 DIAGNOSIS — I25.10 CORONARY ARTERY DISEASE INVOLVING NATIVE CORONARY ARTERY OF NATIVE HEART WITHOUT ANGINA PECTORIS: Primary | ICD-10-CM

## 2020-05-08 DIAGNOSIS — I50.32 CHRONIC HEART FAILURE WITH PRESERVED EJECTION FRACTION (HFPEF) (H): ICD-10-CM

## 2020-05-08 DIAGNOSIS — I10 HYPERTENSION GOAL BP (BLOOD PRESSURE) < 140/90: ICD-10-CM

## 2020-05-08 DIAGNOSIS — R06.02 SOB (SHORTNESS OF BREATH): ICD-10-CM

## 2020-05-08 DIAGNOSIS — I35.0 SEVERE AORTIC STENOSIS: ICD-10-CM

## 2020-05-08 DIAGNOSIS — E78.5 HYPERLIPIDEMIA WITH TARGET LDL LESS THAN 100: ICD-10-CM

## 2020-05-08 PROCEDURE — 99213 OFFICE O/P EST LOW 20 MIN: CPT | Mod: 95 | Performed by: PHYSICIAN ASSISTANT

## 2020-05-08 ASSESSMENT — MIFFLIN-ST. JEOR: SCORE: 1316.88

## 2020-05-08 NOTE — TELEPHONE ENCOUNTER
PA Initiation    Medication: pradaxa 150 blister pack -   Insurance Company: [facility] - Phone [phone] Fax [phone]  Pharmacy Filling the Rx: [id number] [place], [place] - [facility] [address] AT [facility] & [address] [place] [address]  Filling Pharmacy Phone: [phone]  Filling Pharmacy Fax: [phone]  Start Date: 5/8/2020

## 2020-05-08 NOTE — TELEPHONE ENCOUNTER
Wellness Screening Tool    Symptom Screening:    Do you have one of the following new symptoms:      Fever or reported chills?  No    A new cough (started within the past 14 days)?  No    Shortness of breath (started within the past 14 days)?  No    Nausea, vomiting or diarrhea?  No    Within the past 3 weeks, have you been exposed to someone with a known positive illness below?      COVID - 19 (known or suspected) no    Chicken pox?   no    Measles? no    Pertussis? No          Patient notified of visitor restriction: Yes   Patient informed to wear a mask:YES     Patient's appointment status: Patient will be seen in clinic as scheduled on 05/11/20

## 2020-05-08 NOTE — LETTER
5/8/2020      RE: Shiraz Ingram  [address]  [facility] [address]       Dear Colleague,    Thank you for the opportunity to participate in the care of your patient, Shiraz Ingram, at the [facility]. Please see a copy of my visit note below.    Shiraz Ingram presents for a hospital follow up visit.     I have reviewed and updated the patient's Past Medical History, Social History, Family History and Medication List.    ALLERGIES  No known drug allergies    MEDICATIONS  Current Outpatient Medications   Medication Sig Dispense Refill     albuterol (PROAIR HFA) 108 (90 Base) MCG/ACT inhaler INHALE 1 TO 2 PUFFS EVERY 4 TO 6 HOURS AS NEEDED 1 Inhaler 11     aspirin (ASA) 81 MG EC tablet Take 1 tablet (81 mg) by mouth daily       budesonide (PULMICORT) 0.5 MG/2ML nebulizer solution Take 2 mLs (0.5 mg) by nebulization 2 times daily 120 mL 0     calcium carbonate 600 mg-vitamin D 400 units (CALTRATE) 600-400 MG-UNIT per tablet Take 1 tablet by mouth daily       dabigatran ANTICOAGULANT (PRADAXA) 150 MG capsule Take 1 capsule (150 mg) by mouth 2 times daily Store in original 's bottle or blister pack; use within 120 days of opening. 180 capsule 3     ferrous sulfate (IRON) 325 (65 Fe) MG tablet TAKE 1 TABLET(325 MG) BY MOUTH TWICE DAILY 180 tablet 0     furosemide (LASIX) 40 MG tablet HOLD as of 5/5/20. Use only as directed by CORE clinic. 30 tablet 0     ipratropium - albuterol 0.5 mg/2.5 mg/3 mL (DUONEB) 0.5-2.5 (3) MG/3ML nebulization Inhale 1 vial (3 mLs) into the lungs 4 times daily 360 mL 11     lisinopril (ZESTRIL) 20 MG tablet Take 0.5 tablets (10 mg) by mouth daily 90 tablet 1     metoprolol tartrate (LOPRESSOR) 50 MG tablet TAKE 1/2 TABLET(25 MG) BY MOUTH TWICE DAILY 90 tablet 3     montelukast (SINGULAIR) 10 MG tablet TAKE 1 TABLET(10 MG) BY MOUTH DAILY 90 tablet 1     Multiple Vitamins-Minerals (MULTIVITAMIN  PO) Take 1 tablet by mouth daily       simvastatin (ZOCOR) 40 MG tablet Take 1 tablet (40 mg) by mouth At Bedtime 90 tablet 3       HPI: (Please see Dr. Field's note from 2/10/20 for the patient's detailed history)  The patient has a pertinent cardiac history of the following -   1.  Recent admission for acute on chronic HFpEF, LVEF 55-60%, normal RV fn. Had excellent diuresis with 12 pound weight loss on IV Lasix as inpatient; transitioned to oral Lasix 40 mg daily. Was on no diuretics PTA.  2.  Severe aortic valve stenosis with valve area of 0.5 cm , mean gradient 52 mmHg, peak transaortic velocity 5 m/s. This has undergone workup with the TAVR team / Dr. Samuel (/[facility], ALINE performed 4/23) and plan is for TAVR (with intraop ALINE) in ~6 weeks (tentatively June 9th).  3.  History of stable CAD s/p CABG in 2002 [LIMA -LAD, VG-OM and the RCA].  Inpt angio shows moderate touchdown lesion of the right PDA branch of the LCX-[id number] Y-graft.  Patent LIMA to LAD, vein graft to the OM, and right posterior lateral Y graft branch.  4.  Persistent atrial fibrillation.  5.  Possible left atrial appendage clot.  Initially detected on CT scan for TAVR work-up in February 2020. Had recently been started on Xarelto for atrial fibrillation.  On recent ALINE, there is dense spontaneous echo contrast in the left atrial appendage without definite clot. He was switched to Pradaxa.    CORE enrollment 5/5: Today, we are conducting the visit with his son Nicko, who is interpreting to Shiraz with [facility] and relaying Shiraz's answers. Weight has been declining slowly (154 lbs upon arriving home to 152 lbs today). BP's have been running in the upper 90's-low 100's/50's-60's. Labs today show significant RD / prerenal azotemia with hyponatremia and hyperkalemia. Again, he was not on diuretics PTA.  He actually feels very well overall, and denies chest pain, dyspnea, near-syncope, leg swelling, orthopnea. No issues with bruising or bleeding. Reports  appetite is good. Drinking two 16 oz bottles of water per day. He manages his own medications and uses a pill box. His sons Nicko and Forrest both check in on him.   - Stopped Lasix 40 and Reduced Lisinopril from 20 to 10 mg daily.    Today, three days later, I'm conducting the visit with his other son Forrest. Shiraz tells him he's feeling fine and Forrest thinks he looks well. Denies any CHF symptoms. Weight is stable. BP is well-controlled, a little higher. Sons are cooking low-sodium for him. Forrest and Shiraz have no questions or concerns today.     Component      Latest Ref Rng & Units 4/27/2020 5/5/2020   Sodium      136 - 145 mmol/L 136 132 (L)   Potassium      3.5 - 5.1 mmol/L 4.2 5.4 (H)   Chloride      98 - 107 mmol/L 95 95 (L)   Carbon Dioxide      23 - 29 mmol/L 35 (H) 29   Anion Gap      6 - 17 mmol/L 6 13.4   Glucose      70 - 105 mg/dL 83 89   Urea Nitrogen      7 - 30 mg/dL 50 (H) 81 (H)   Creatinine      0.70 - 1.30 mg/dL 1.59 (H) 2.34 (H)   GFR Estimate      >60 mL/min/1.73:m2 39 (L) 27 (L)   GFR Estimate If Black      >60 mL/min/1.73:m2 45 (L) 32 (L)   Calcium      8.5 - 10.5 mg/dL 9.5 9.6       Assessment/Plan:  1.  Acute on chronic diastolic heart failure exacerbation    - Wedge of 30 on 4/24 at a weight of 165 lbs as inpt.    - 5/5 Labs suggestive of significant pre-renal azotemia with hyperkalemia, in the setting of dehydration, too-tightly controlled blood pressure, and severe aortic stenosis at a wt of 152 lbs on furosemide 40 daily.    - Currently 152 lbs (stable) and BP slightly higher, feeling well following furosemide discontinuation and lisinopril reduction 3 days ago. Goal wt ~155-157 lbs.   - No changes today.     2.  Severe aortic stenosis - LAUREEN 0.5 cm2, mean gradient 52 mmHg, Vmax 4.9. Awaiting TAVR (with intraop ALINE) in ~6 weeks (tentatively June 9th). Per TAVR team.  3.  Stable CAD s/p CABG   4.  Persistent, likely permanent atrial fibrillation with slow ventricular response, asymptomatic.    5.  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   6.  Left atrial appendage clot, detected on CT scan for TAVR workup 2/2020, likely persistent but improved on recent ALINE. Compliant with Pradaxa.  7.  Hypertension, previously too strictly controlled in the setting of aortic stenosis.   8.  RD on CKD-III - creat 1.6 --> 2.3, pre-renal azotemia. Plan as above.     Follow-up:  - BMP, Hgb Monday AM and telephone visit with me @ 3:10 (again with Forrest @ [phone]).    Phone call duration: 9 minutes    Amee Rocha PA-C  [facility]  Pager: [phone]  Text Page  (7:30am - 4pm M-F)     Please do not hesitate to contact me if you have any questions/concerns.     Sincerely,     Amee Rocha PA-C

## 2020-05-08 NOTE — TELEPHONE ENCOUNTER
Prior Authorization Retail Medication Request    Medication/Dose: pradaxa 150 blister pack  ICD code (if different than what is on RX):  A-fib  Previously Tried and Failed:  xarelto  Rationale:   Persistent atrial fibrillation.   Possible left atrial appendage clot.  Initially detected on CT scan for TAVR work-up in February 2020. Had recently been started on Xarelto for atrial fibrillation.  On recent ALINE, there is dense spontaneous echo contrast in the left atrial appendage without definite clot.+++ He was switched to Pradaxa.  Is scheduled for TAVR in june    Insurance Name:  [facility]  Insurance ID:  [id number]      Pharmacy Information (if different than what is on RX)  Name: [facility] [address]   Phone: [phone]      ASAP PLEASE

## 2020-05-08 NOTE — PROGRESS NOTES
"Shiraz Ingram is a 85 year old male who is being evaluated via a billable telephone visit.      The patient has been notified of following:     \"This telephone visit will be conducted via a call between you and your physician/provider. We have found that certain health care needs can be provided without the need for a physical exam.  This service lets us provide the care you need with a short phone conversation.  If a prescription is necessary we can send it directly to your pharmacy.  If lab work is needed we can place an order for that and you can then stop by our lab to have the test done at a later time.    Telephone visits are billed at different rates depending on your insurance coverage. During this emergency period, for some insurers they may be billed the same as an in-person visit.  Please reach out to your insurance provider with any questions.    If during the course of the call the physician/provider feels a telephone visit is not appropriate, you will not be charged for this service.\"    Patient has given verbal consent for Telephone visit?  Yes    What phone number would you like to be contacted at? [phone]    How would you like to obtain your AVS? Mail a copy     Review Of Systems  Skin: NEGATIVE  Eyes:Ears/Nose/Throat: wears glasses, deaf but wears a hearing aide  Respiratory: NEGATIVE  Cardiovascular:NEGATIVE  Gastrointestinal: NEGATIVE  Genitourinary:NEGATIVE   Musculoskeletal: NEGATIVE  Neurologic: NEGATIVE  Psychiatric: NEGATIVE  Hematologic/Lymphatic/Immunologic: NEGATIVE  Endocrine:  NEGATIVE  Vitals today are:  /58  Pulse 56  Weight 152.8 lb  JADE Del Angel    Shiraz Ingram is a 85 year old male who is being evaluated via a billable telephone visit.  This visit is being conducted as a virtual visit due to the emphasis on mitigation of the COVID-19 virus pandemic. The clinician has decided that the risk of an in-office visit outweighs the benefit for this patient.     The " "patient has been notified of following:     \"This telephone visit will be conducted via a call between you and your physician/provider. We have found that certain health care needs can be provided without the need for a physical exam.  This service lets us provide the care you need with a short phone conversation.  If a prescription is necessary we can send it directly to your pharmacy.  If lab work is needed we can place an order for that and you can then stop by our lab to have the test done at a later time.  Telephone visits are billed at different rates depending on your insurance coverage. During this emergency period, for some insurers they may be billed the same as an in-person visit.  Please reach out to your insurance provider with any questions.  If during the course of the call the physician/provider feels a telephone visit is not appropriate, you will not be charged for this service.\"    Patient has given verbal consent for Telephone visit? Yes    How would you like to obtain your AVS? Mail a copy    MA ROS for CORE follow-ups:  - Any difficulty breathing,  NOincluding when trying to lie in bed at night? NO  - Any leg swelling  NO(?compression or wraps),  NO abdominal bloating,   NO  reduction in appetite?  NO  - Any chest pain or palpitations?  NO  - Weight trend over the past week?  STEADY  - O2 or CPAP use?  NO  - Do they have a pulse oximeter they can use to check O2 sat/HR?  NO JUST A bp MONITOR  Today's Home Vitals:  /58  Pulse 55  Weight   152.8 lb  Roxie Rodriguez SHU Ingram presents for a hospital follow up visit.     I have reviewed and updated the patient's Past Medical History, Social History, Family History and Medication List.    ALLERGIES  No known drug allergies    MEDICATIONS  Current Outpatient Medications   Medication Sig Dispense Refill     albuterol (PROAIR HFA) 108 (90 Base) MCG/ACT inhaler INHALE 1 TO 2 PUFFS EVERY 4 TO 6 HOURS AS NEEDED 1 Inhaler 11     aspirin " (ASA) 81 MG EC tablet Take 1 tablet (81 mg) by mouth daily       budesonide (PULMICORT) 0.5 MG/2ML nebulizer solution Take 2 mLs (0.5 mg) by nebulization 2 times daily 120 mL 0     calcium carbonate 600 mg-vitamin D 400 units (CALTRATE) 600-400 MG-UNIT per tablet Take 1 tablet by mouth daily       dabigatran ANTICOAGULANT (PRADAXA) 150 MG capsule Take 1 capsule (150 mg) by mouth 2 times daily Store in original 's bottle or blister pack; use within 120 days of opening. 180 capsule 3     ferrous sulfate (IRON) 325 (65 Fe) MG tablet TAKE 1 TABLET(325 MG) BY MOUTH TWICE DAILY 180 tablet 0     furosemide (LASIX) 40 MG tablet HOLD as of 5/5/20. Use only as directed by CORE clinic. 30 tablet 0     ipratropium - albuterol 0.5 mg/2.5 mg/3 mL (DUONEB) 0.5-2.5 (3) MG/3ML nebulization Inhale 1 vial (3 mLs) into the lungs 4 times daily 360 mL 11     lisinopril (ZESTRIL) 20 MG tablet Take 0.5 tablets (10 mg) by mouth daily 90 tablet 1     metoprolol tartrate (LOPRESSOR) 50 MG tablet TAKE 1/2 TABLET(25 MG) BY MOUTH TWICE DAILY 90 tablet 3     montelukast (SINGULAIR) 10 MG tablet TAKE 1 TABLET(10 MG) BY MOUTH DAILY 90 tablet 1     Multiple Vitamins-Minerals (MULTIVITAMIN PO) Take 1 tablet by mouth daily       simvastatin (ZOCOR) 40 MG tablet Take 1 tablet (40 mg) by mouth At Bedtime 90 tablet 3       HPI: (Please see Dr. Field's note from 2/10/20 for the patient's detailed history)  The patient has a pertinent cardiac history of the following -   1.  Recent admission for acute on chronic HFpEF, LVEF 55-60%, normal RV fn. Had excellent diuresis with 12 pound weight loss on IV Lasix as inpatient; transitioned to oral Lasix 40 mg daily. Was on no diuretics PTA.  2.  Severe aortic valve stenosis with valve area of 0.5 cm , mean gradient 52 mmHg, peak transaortic velocity 5 m/s. This has undergone workup with the TAVR team / Dr. Samuel (/[facility], ALINE performed 4/23) and plan is for TAVR (with intraop ALINE) in ~6 weeks (tentatively  June 9th).  3.  History of stable CAD s/p CABG in 2002 [LIMA -LAD, VG-OM and the RCA].  Inpt angio shows moderate touchdown lesion of the right PDA branch of the IAM-[id number]-zUG Y-graft.  Patent LIMA to LAD, vein graft to the OM, and right posterior lateral Y graft branch.  4.  Persistent atrial fibrillation.  5.  Possible left atrial appendage clot.  Initially detected on CT scan for TAVR work-up in February 2020. Had recently been started on Xarelto for atrial fibrillation.  On recent ALINE, there is dense spontaneous echo contrast in the left atrial appendage without definite clot. He was switched to Pradaxa.    CORE enrollment 5/5: Today, we are conducting the visit with his son Nicko, who is interpreting to Shiraz with [employer] and relaying Shiraz's answers. Weight has been declining slowly (154 lbs upon arriving home to 152 lbs today). BP's have been running in the upper 90's-low 100's/50's-60's. Labs today show significant RD / prerenal azotemia with hyponatremia and hyperkalemia. Again, he was not on diuretics PTA.  He actually feels very well overall, and denies chest pain, dyspnea, near-syncope, leg swelling, orthopnea. No issues with bruising or bleeding. Reports appetite is good. Drinking two 16 oz bottles of water per day. He manages his own medications and uses a pill box. His sons Nicko and Forrest both check in on him.   - Stopped Lasix 40 and Reduced Lisinopril from 20 to 10 mg daily.    Today, three days later, I'm conducting the visit with his other son Forrest. Shiraz tells him he's feeling fine and Forrest thinks he looks well. Denies any CHF symptoms. Weight is stable. BP is well-controlled, a little higher. Sons are cooking low-sodium for him. Forrest and Shiraz have no questions or concerns today.     Component      Latest Ref Rng & Units 4/27/2020 5/5/2020   Sodium      136 - 145 mmol/L 136 132 (L)   Potassium      3.5 - 5.1 mmol/L 4.2 5.4 (H)   Chloride      98 - 107 mmol/L 95 95 (L)   Carbon Dioxide      23 - 29  mmol/L 35 (H) 29   Anion Gap      6 - 17 mmol/L 6 13.4   Glucose      70 - 105 mg/dL 83 89   Urea Nitrogen      7 - 30 mg/dL 50 (H) 81 (H)   Creatinine      0.70 - 1.30 mg/dL 1.59 (H) 2.34 (H)   GFR Estimate      >60 mL/min/1.73:m2 39 (L) 27 (L)   GFR Estimate If Black      >60 mL/min/1.73:m2 45 (L) 32 (L)   Calcium      8.5 - 10.5 mg/dL 9.5 9.6       Assessment/Plan:  1.  Acute on chronic diastolic heart failure exacerbation    - Wedge of 30 on 4/24 at a weight of 165 lbs as inpt.    - 5/5 Labs suggestive of significant pre-renal azotemia with hyperkalemia, in the setting of dehydration, too-tightly controlled blood pressure, and severe aortic stenosis at a wt of 152 lbs on furosemide 40 daily.    - Currently 152 lbs (stable) and BP slightly higher, feeling well following furosemide discontinuation and lisinopril reduction 3 days ago. Goal wt ~155-157 lbs.   - No changes today.     2.  Severe aortic stenosis - LAUREEN 0.5 cm2, mean gradient 52 mmHg, Vmax 4.9. Awaiting TAVR (with intraop ALINE) in ~6 weeks (tentatively June 9th). Per TAVR team.  3.  Stable CAD s/p CABG   4.  Persistent, likely permanent atrial fibrillation with slow ventricular response, asymptomatic.   5.  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   6.  Left atrial appendage clot, detected on CT scan for TAVR workup 2/2020, likely persistent but improved on recent ALINE. Compliant with Pradaxa.  7.  Hypertension, previously too strictly controlled in the setting of aortic stenosis.   8.  RD on CKD-III - creat 1.6 --> 2.3, pre-renal azotemia. Plan as above.     Follow-up:  - BMP, Hgb Monday AM and telephone visit with me @ 3:10 (again with Forrest @ [phone]).    Phone call duration: 9 minutes    Amee Rocha PA-C  [facility] - [facility] Clinic  Pager: [phone]  Text Page  (7:30am - 4pm M-F)

## 2020-05-11 ENCOUNTER — HOSPITAL ENCOUNTER (INPATIENT)
Facility: CLINIC | Age: 85
LOS: 3 days | Discharge: HOME | DRG: 641 | End: 2020-05-14
Attending: EMERGENCY MEDICINE | Admitting: INTERNAL MEDICINE
Payer: COMMERCIAL

## 2020-05-11 ENCOUNTER — CARE COORDINATION (OUTPATIENT)
Dept: CARDIOLOGY | Facility: CLINIC | Age: 85
End: 2020-05-11

## 2020-05-11 DIAGNOSIS — E87.1 HYPONATREMIA: ICD-10-CM

## 2020-05-11 DIAGNOSIS — E87.5 HYPERKALEMIA: ICD-10-CM

## 2020-05-11 DIAGNOSIS — I50.32 CHRONIC HEART FAILURE WITH PRESERVED EJECTION FRACTION (HFPEF) (H): ICD-10-CM

## 2020-05-11 DIAGNOSIS — I35.0 AORTIC VALVE STENOSIS, ETIOLOGY OF CARDIAC VALVE DISEASE UNSPECIFIED: ICD-10-CM

## 2020-05-11 DIAGNOSIS — N28.9 RENAL INSUFFICIENCY: ICD-10-CM

## 2020-05-11 LAB
ANION GAP SERPL CALCULATED.3IONS-SCNC: 4 MMOL/L (ref 3–14)
ANION GAP SERPL CALCULATED.3IONS-SCNC: 5 MMOL/L (ref 3–14)
ANION GAP SERPL CALCULATED.3IONS-SCNC: ABNORMAL MMOL/L (ref 6–17)
BASOPHILS # BLD AUTO: 0 10E9/L (ref 0–0.2)
BASOPHILS NFR BLD AUTO: 0.3 %
BUN SERPL-MCNC: 52 MG/DL (ref 7–30)
BUN SERPL-MCNC: 54 MG/DL (ref 7–30)
BUN SERPL-MCNC: 54 MG/DL (ref 7–30)
CALCIUM SERPL-MCNC: 10.5 MG/DL (ref 8.5–10.5)
CALCIUM SERPL-MCNC: 9.5 MG/DL (ref 8.5–10.1)
CALCIUM SERPL-MCNC: 9.8 MG/DL (ref 8.5–10.1)
CHLORIDE SERPL-SCNC: 97 MMOL/L (ref 94–109)
CHLORIDE SERPL-SCNC: 98 MMOL/L (ref 94–109)
CHLORIDE SERPL-SCNC: 98 MMOL/L (ref 98–107)
CO2 SERPL-SCNC: 24 MMOL/L (ref 20–32)
CO2 SERPL-SCNC: 26 MMOL/L (ref 20–32)
CO2 SERPL-SCNC: 27 MMOL/L (ref 23–29)
CREAT SERPL-MCNC: 1.79 MG/DL (ref 0.66–1.25)
CREAT SERPL-MCNC: 1.86 MG/DL (ref 0.66–1.25)
CREAT SERPL-MCNC: 1.92 MG/DL (ref 0.7–1.3)
DIFFERENTIAL METHOD BLD: ABNORMAL
EOSINOPHIL # BLD AUTO: 0.1 10E9/L (ref 0–0.7)
EOSINOPHIL NFR BLD AUTO: 2.2 %
ERYTHROCYTE [DISTWIDTH] IN BLOOD BY AUTOMATED COUNT: 16 % (ref 10–15)
GFR SERPL CREATININE-BSD FRML MDRD: 32 ML/MIN/{1.73_M2}
GFR SERPL CREATININE-BSD FRML MDRD: 33 ML/MIN/{1.73_M2}
GFR SERPL CREATININE-BSD FRML MDRD: 34 ML/MIN/{1.73_M2}
GLUCOSE BLDC GLUCOMTR-MCNC: 100 MG/DL (ref 70–99)
GLUCOSE BLDC GLUCOMTR-MCNC: 123 MG/DL (ref 70–99)
GLUCOSE BLDC GLUCOMTR-MCNC: 217 MG/DL (ref 70–99)
GLUCOSE BLDC GLUCOMTR-MCNC: 78 MG/DL (ref 70–99)
GLUCOSE BLDC GLUCOMTR-MCNC: 89 MG/DL (ref 70–99)
GLUCOSE SERPL-MCNC: 100 MG/DL (ref 70–105)
GLUCOSE SERPL-MCNC: 136 MG/DL (ref 70–99)
GLUCOSE SERPL-MCNC: 86 MG/DL (ref 70–99)
HCT VFR BLD AUTO: 37.1 % (ref 40–53)
HGB BLD-MCNC: 12 G/DL (ref 13.3–17.7)
HGB BLD-MCNC: 12.4 G/DL (ref 13.3–17.7)
IMM GRANULOCYTES # BLD: 0 10E9/L (ref 0–0.4)
IMM GRANULOCYTES NFR BLD: 0.2 %
INTERPRETATION ECG - MUSE: NORMAL
LYMPHOCYTES # BLD AUTO: 0.9 10E9/L (ref 0.8–5.3)
LYMPHOCYTES NFR BLD AUTO: 15.6 %
MCH RBC QN AUTO: 20.8 PG (ref 26.5–33)
MCHC RBC AUTO-ENTMCNC: 32.3 G/DL (ref 31.5–36.5)
MCV RBC AUTO: 64 FL (ref 78–100)
MONOCYTES # BLD AUTO: 0.4 10E9/L (ref 0–1.3)
MONOCYTES NFR BLD AUTO: 6.9 %
NEUTROPHILS # BLD AUTO: 4.3 10E9/L (ref 1.6–8.3)
NEUTROPHILS NFR BLD AUTO: 74.8 %
NRBC # BLD AUTO: 0 10*3/UL
NRBC BLD AUTO-RTO: 0 /100
PLATELET # BLD AUTO: 120 10E9/L (ref 150–450)
POTASSIUM SERPL-SCNC: 5.5 MMOL/L (ref 3.4–5.3)
POTASSIUM SERPL-SCNC: 6 MMOL/L (ref 3.4–5.3)
POTASSIUM SERPL-SCNC: 6.6 MMOL/L (ref 3.5–5.1)
POTASSIUM SERPL-SCNC: 6.8 MMOL/L (ref 3.4–5.3)
RBC # BLD AUTO: 5.78 10E12/L (ref 4.4–5.9)
SODIUM SERPL-SCNC: 126 MMOL/L (ref 133–144)
SODIUM SERPL-SCNC: 128 MMOL/L (ref 133–144)
SODIUM SERPL-SCNC: 130 MMOL/L (ref 136–145)
WBC # BLD AUTO: 5.8 10E9/L (ref 4–11)

## 2020-05-11 PROCEDURE — 99285 EMERGENCY DEPT VISIT HI MDM: CPT | Mod: 25

## 2020-05-11 PROCEDURE — 80048 BASIC METABOLIC PNL TOTAL CA: CPT | Performed by: PHYSICIAN ASSISTANT

## 2020-05-11 PROCEDURE — 96375 TX/PRO/DX INJ NEW DRUG ADDON: CPT

## 2020-05-11 PROCEDURE — 96376 TX/PRO/DX INJ SAME DRUG ADON: CPT

## 2020-05-11 PROCEDURE — 25800025 ZZH RX 258: Performed by: EMERGENCY MEDICINE

## 2020-05-11 PROCEDURE — 25000125 ZZHC RX 250: Performed by: INTERNAL MEDICINE

## 2020-05-11 PROCEDURE — 00000146 ZZHCL STATISTIC GLUCOSE BY METER IP

## 2020-05-11 PROCEDURE — 96365 THER/PROPH/DIAG IV INF INIT: CPT

## 2020-05-11 PROCEDURE — 12000000 ZZH R&B MED SURG/OB

## 2020-05-11 PROCEDURE — 80048 BASIC METABOLIC PNL TOTAL CA: CPT | Performed by: EMERGENCY MEDICINE

## 2020-05-11 PROCEDURE — 25800030 ZZH RX IP 258 OP 636: Performed by: EMERGENCY MEDICINE

## 2020-05-11 PROCEDURE — 85025 COMPLETE CBC W/AUTO DIFF WBC: CPT | Performed by: EMERGENCY MEDICINE

## 2020-05-11 PROCEDURE — 85018 HEMOGLOBIN: CPT | Performed by: PHYSICIAN ASSISTANT

## 2020-05-11 PROCEDURE — 25000132 ZZH RX MED GY IP 250 OP 250 PS 637: Performed by: INTERNAL MEDICINE

## 2020-05-11 PROCEDURE — 25000132 ZZH RX MED GY IP 250 OP 250 PS 637: Performed by: HOSPITALIST

## 2020-05-11 PROCEDURE — 80048 BASIC METABOLIC PNL TOTAL CA: CPT | Performed by: INTERNAL MEDICINE

## 2020-05-11 PROCEDURE — 99207 ZZC NO CHARGE LOS: CPT | Mod: U3

## 2020-05-11 PROCEDURE — 84132 ASSAY OF SERUM POTASSIUM: CPT | Performed by: INTERNAL MEDICINE

## 2020-05-11 PROCEDURE — 25000128 H RX IP 250 OP 636: Performed by: EMERGENCY MEDICINE

## 2020-05-11 PROCEDURE — 96366 THER/PROPH/DIAG IV INF ADDON: CPT

## 2020-05-11 PROCEDURE — 25000131 ZZH RX MED GY IP 250 OP 636 PS 637: Performed by: EMERGENCY MEDICINE

## 2020-05-11 PROCEDURE — 99223 1ST HOSP IP/OBS HIGH 75: CPT | Mod: AI | Performed by: INTERNAL MEDICINE

## 2020-05-11 PROCEDURE — 36415 COLL VENOUS BLD VENIPUNCTURE: CPT | Performed by: INTERNAL MEDICINE

## 2020-05-11 PROCEDURE — 93005 ELECTROCARDIOGRAM TRACING: CPT

## 2020-05-11 RX ORDER — AMOXICILLIN 250 MG
1 CAPSULE ORAL 2 TIMES DAILY PRN
Status: DISCONTINUED | OUTPATIENT
Start: 2020-05-11 | End: 2020-05-14 | Stop reason: HOSPADM

## 2020-05-11 RX ORDER — PROCHLORPERAZINE 25 MG
12.5 SUPPOSITORY, RECTAL RECTAL EVERY 12 HOURS PRN
Status: DISCONTINUED | OUTPATIENT
Start: 2020-05-11 | End: 2020-05-14 | Stop reason: HOSPADM

## 2020-05-11 RX ORDER — ALBUTEROL SULFATE 90 UG/1
1-2 AEROSOL, METERED RESPIRATORY (INHALATION) EVERY 4 HOURS PRN
Status: DISCONTINUED | OUTPATIENT
Start: 2020-05-11 | End: 2020-05-14 | Stop reason: HOSPADM

## 2020-05-11 RX ORDER — ACETAMINOPHEN 325 MG/1
650 TABLET ORAL EVERY 4 HOURS PRN
Status: DISCONTINUED | OUTPATIENT
Start: 2020-05-11 | End: 2020-05-14 | Stop reason: HOSPADM

## 2020-05-11 RX ORDER — ONDANSETRON 2 MG/ML
4 INJECTION INTRAMUSCULAR; INTRAVENOUS EVERY 6 HOURS PRN
Status: DISCONTINUED | OUTPATIENT
Start: 2020-05-11 | End: 2020-05-14 | Stop reason: HOSPADM

## 2020-05-11 RX ORDER — DEXTROSE MONOHYDRATE 25 G/50ML
25 INJECTION, SOLUTION INTRAVENOUS ONCE
Status: COMPLETED | OUTPATIENT
Start: 2020-05-11 | End: 2020-05-11

## 2020-05-11 RX ORDER — DEXTROSE MONOHYDRATE 100 MG/ML
INJECTION, SOLUTION INTRAVENOUS CONTINUOUS
Status: DISCONTINUED | OUTPATIENT
Start: 2020-05-11 | End: 2020-05-12

## 2020-05-11 RX ORDER — FUROSEMIDE 10 MG/ML
40 INJECTION INTRAMUSCULAR; INTRAVENOUS ONCE
Status: COMPLETED | OUTPATIENT
Start: 2020-05-11 | End: 2020-05-11

## 2020-05-11 RX ORDER — METOPROLOL TARTRATE 25 MG/1
25 TABLET, FILM COATED ORAL 2 TIMES DAILY
Status: DISCONTINUED | OUTPATIENT
Start: 2020-05-11 | End: 2020-05-12

## 2020-05-11 RX ORDER — POLYETHYLENE GLYCOL 3350 17 G/17G
17 POWDER, FOR SOLUTION ORAL DAILY PRN
Status: DISCONTINUED | OUTPATIENT
Start: 2020-05-11 | End: 2020-05-14 | Stop reason: HOSPADM

## 2020-05-11 RX ORDER — LIDOCAINE 40 MG/G
CREAM TOPICAL
Status: DISCONTINUED | OUTPATIENT
Start: 2020-05-11 | End: 2020-05-12

## 2020-05-11 RX ORDER — ASPIRIN 81 MG/1
81 TABLET ORAL DAILY
Status: DISCONTINUED | OUTPATIENT
Start: 2020-05-12 | End: 2020-05-14 | Stop reason: HOSPADM

## 2020-05-11 RX ORDER — SODIUM POLYSTYRENE SULFONATE 15 G/60ML
15 SUSPENSION ORAL; RECTAL ONCE
Status: COMPLETED | OUTPATIENT
Start: 2020-05-11 | End: 2020-05-11

## 2020-05-11 RX ORDER — DABIGATRAN ETEXILATE 75 MG/1
75 CAPSULE ORAL 2 TIMES DAILY
Status: DISCONTINUED | OUTPATIENT
Start: 2020-05-11 | End: 2020-05-14

## 2020-05-11 RX ORDER — BUDESONIDE 0.5 MG/2ML
0.5 INHALANT ORAL 2 TIMES DAILY
Status: DISCONTINUED | OUTPATIENT
Start: 2020-05-11 | End: 2020-05-14 | Stop reason: HOSPADM

## 2020-05-11 RX ORDER — PROCHLORPERAZINE MALEATE 5 MG
5 TABLET ORAL EVERY 6 HOURS PRN
Status: DISCONTINUED | OUTPATIENT
Start: 2020-05-11 | End: 2020-05-14 | Stop reason: HOSPADM

## 2020-05-11 RX ORDER — NALOXONE HYDROCHLORIDE 0.4 MG/ML
.1-.4 INJECTION, SOLUTION INTRAMUSCULAR; INTRAVENOUS; SUBCUTANEOUS
Status: DISCONTINUED | OUTPATIENT
Start: 2020-05-11 | End: 2020-05-14 | Stop reason: HOSPADM

## 2020-05-11 RX ORDER — MONTELUKAST SODIUM 10 MG/1
10 TABLET ORAL DAILY
Status: DISCONTINUED | OUTPATIENT
Start: 2020-05-12 | End: 2020-05-14 | Stop reason: HOSPADM

## 2020-05-11 RX ORDER — SIMVASTATIN 40 MG
40 TABLET ORAL AT BEDTIME
Status: DISCONTINUED | OUTPATIENT
Start: 2020-05-11 | End: 2020-05-14 | Stop reason: HOSPADM

## 2020-05-11 RX ORDER — BISACODYL 10 MG
10 SUPPOSITORY, RECTAL RECTAL DAILY PRN
Status: DISCONTINUED | OUTPATIENT
Start: 2020-05-11 | End: 2020-05-14 | Stop reason: HOSPADM

## 2020-05-11 RX ORDER — AMOXICILLIN 250 MG
2 CAPSULE ORAL 2 TIMES DAILY PRN
Status: DISCONTINUED | OUTPATIENT
Start: 2020-05-11 | End: 2020-05-14 | Stop reason: HOSPADM

## 2020-05-11 RX ORDER — ONDANSETRON 4 MG/1
4 TABLET, ORALLY DISINTEGRATING ORAL EVERY 6 HOURS PRN
Status: DISCONTINUED | OUTPATIENT
Start: 2020-05-11 | End: 2020-05-14 | Stop reason: HOSPADM

## 2020-05-11 RX ADMIN — DEXTROSE MONOHYDRATE 25 G: 25 INJECTION, SOLUTION INTRAVENOUS at 14:05

## 2020-05-11 RX ADMIN — SODIUM POLYSTYRENE SULFONATE 15 G: 15 SUSPENSION ORAL; RECTAL at 17:57

## 2020-05-11 RX ADMIN — BUDESONIDE 0.5 MG: 0.5 INHALANT RESPIRATORY (INHALATION) at 21:08

## 2020-05-11 RX ADMIN — DABIGATRAN ETEXILATE MESYLATE 75 MG: 75 CAPSULE ORAL at 20:53

## 2020-05-11 RX ADMIN — DEXTROSE MONOHYDRATE: 100 INJECTION, SOLUTION INTRAVENOUS at 14:12

## 2020-05-11 RX ADMIN — FUROSEMIDE 40 MG: 10 INJECTION, SOLUTION INTRAVENOUS at 14:10

## 2020-05-11 RX ADMIN — SODIUM CHLORIDE 6.9 UNITS: 9 INJECTION, SOLUTION INTRAVENOUS at 14:06

## 2020-05-11 RX ADMIN — SIMVASTATIN 40 MG: 40 TABLET, FILM COATED ORAL at 21:53

## 2020-05-11 ASSESSMENT — ACTIVITIES OF DAILY LIVING (ADL): ADLS_ACUITY_SCORE: 14

## 2020-05-11 NOTE — ED NOTES
[facility]  ED Nurse Handoff Report    ED Chief complaint: Abnormal Labs      ED Diagnosis:   Final diagnoses:   None       Code Status: To be addressed by admitting MD.     Allergies:   Allergies   Allergen Reactions     No Known Drug Allergies        Patient Story: Pt has a hx of hypertension, hyperlipidemia, CAD, CHF, and CKD. Pt presents from primary care for abnormal labs.   Focused Assessment:  Pt's potassium in ED 6.8. Pt is alert and orientedx4. Pt is extremely hard of hearing. Pt is independent in ED with ambulation.     Treatments and/or interventions provided:   Medications   dextrose 10% infusion ( Intravenous ED Infusing on Admission/transfer 5/11/20 1612)   dextrose 50 % injection 25 g (25 g Intravenous Given 5/11/20 1405)   insulin (regular) (HumuLIN R/NovoLIN R) 1 unit/mL injection 6.9 Units (6.9 Units Intravenous Given 5/11/20 1406)   furosemide (LASIX) injection 40 mg (40 mg Intravenous Given 5/11/20 1410)       Patient's response to treatments and/or interventions:     To be done/followed up on inpatient unit:  Cardiac monitoring, repeat labs    Does this patient have any cognitive concerns?: NA    Activity level - Baseline/Home:  Independent  Activity Level - Current:   Independent    Patient's Preferred language: American Sign Language   Needed?: No    Isolation: None  Infection: Not Applicable  Bariatric?: No    Vital Signs:   Vitals:    05/11/20 1300 05/11/20 1315 05/11/20 1330   BP: (!) 158/91     Pulse: 52  57   Resp: 18     Temp: 97.5  F (36.4  C)     TempSrc: Oral     SpO2: 91% 98% 98%       Cardiac Rhythm:     Was the PSS-3 completed:   Yes  What interventions are required if any?               Family Comments: No family at bedside in ED   OBS brochure/video discussed/provided to patient/family: N/A              Name of person given brochure if not patient: NA              Relationship to patient: NA    For the majority of the shift this patient's behavior was  Green.   Behavioral interventions performed were frequent rounding.    ED NURSE PHONE NUMBER: [id number]

## 2020-05-11 NOTE — PROGRESS NOTES
RECEIVING UNIT ED HANDOFF REVIEW    ED Nurse Handoff Report was reviewed by: Misa Barfield RN on May 11, 2020 at 3:43 PM

## 2020-05-11 NOTE — PHARMACY-ADMISSION MEDICATION HISTORY
Pharmacy Medication History  Admission medication history interview status for the 5/11/2020  admission is complete. See EPIC admission navigator for prior to admission medications     Medication history sources: Patient's family/friend (Son)  Medication history source reliability: Good  Adherence assessment: Good    Significant changes made to the medication list:  Removed Lasix, pt was told to stop from MD.      Additional medication history information:   None    Medication reconciliation completed by provider prior to medication history? No    Time spent in this activity: 15 min      Prior to Admission medications    Medication Sig Last Dose Taking? Auth Provider   albuterol (PROAIR HFA) 108 (90 Base) MCG/ACT inhaler INHALE 1 TO 2 PUFFS EVERY 4 TO 6 HOURS AS NEEDED  Yes Dany Rodriguez MD   aspirin (ASA) 81 MG EC tablet Take 1 tablet (81 mg) by mouth daily 5/11/2020 at Unknown time Yes Manasa Wang MD   budesonide (PULMICORT) 0.5 MG/2ML nebulizer solution Take 2 mLs (0.5 mg) by nebulization 2 times daily 5/11/2020 at x1 Yes Dany Rodriguez MD   calcium carbonate 600 mg-vitamin D 400 units (CALTRATE) 600-400 MG-UNIT per tablet Take 1 tablet by mouth daily 5/11/2020 at Unknown time Yes Unknown, Entered By History   dabigatran ANTICOAGULANT (PRADAXA) 150 MG capsule Take 1 capsule (150 mg) by mouth 2 times daily Store in original 's bottle or blister pack; use within 120 days of opening. 5/11/2020 at x1 Yes Ryan Samuel MD   ferrous sulfate (IRON) 325 (65 Fe) MG tablet TAKE 1 TABLET(325 MG) BY MOUTH TWICE DAILY 5/11/2020 at Unknown time Yes Dany Rodriguez MD   ipratropium - albuterol 0.5 mg/2.5 mg/3 mL (DUONEB) 0.5-2.5 (3) MG/3ML nebulization Inhale 1 vial (3 mLs) into the lungs 4 times daily 5/11/2020 at am Yes Dany Rodriguez MD   lisinopril (ZESTRIL) 20 MG tablet Take 0.5 tablets (10 mg) by mouth daily 5/11/2020 at Unknown time Yes Aj  Amee Dorado PA-C   metoprolol tartrate (LOPRESSOR) 50 MG tablet TAKE 1/2 TABLET(25 MG) BY MOUTH TWICE DAILY 5/11/2020 at x1 Yes Dany Rodriguez MD   montelukast (SINGULAIR) 10 MG tablet TAKE 1 TABLET(10 MG) BY MOUTH DAILY 5/11/2020 at Unknown time Yes Dany Rodriguez MD   Multiple Vitamins-Minerals (MULTIVITAMIN PO) Take 1 tablet by mouth daily 5/11/2020 at Unknown time Yes Unknown, Entered By History   simvastatin (ZOCOR) 40 MG tablet Take 1 tablet (40 mg) by mouth At Bedtime 5/10/2020 at Unknown time Yes Dany Rodriguez MD

## 2020-05-11 NOTE — PROGRESS NOTES
[facility]    Notified by MICHEL Taylor RN, of BMP results including critical K of 6.6.         Pt seeing Amee Rocha PA-C this PM. Amee notified. Will also route Amee this message.     KATHLEEN GrandeN, RN, PHN, HNB-BC   5/11/2020 at 12:27 PM

## 2020-05-11 NOTE — PROGRESS NOTES
Reviewed pt's critical potassium level with Amee Rocha PAC. She plans to review plan of care with MD.    Elly Osman RN BSN   12:31 PM 05/11/20

## 2020-05-11 NOTE — PROGRESS NOTES
If lab is repeated in ED and incorrect and I can still complete visit with him this afternoon I would be glad to. I will be present in clinic this afternoon also. Thanks.

## 2020-05-11 NOTE — ED PROVIDER NOTES
History     Chief Complaint:  Abnormal Labs    HPI   History limited as patient is a poor historian, and supplemented by electronic chart review.    Shiraz Ingram is a 85 year old male with a history of hypertension, hyperlipidemia, coronary artery disease, acute on chronic congestive heart failure, and chronic kidney disease among others on pradaxa who presents to the emergency department today for evaluation of abnormal labs.  He was hospitalized here in April for volume overload and was diuresed.  He is awaiting aortic valve replacement which was postponed due to COVID-19.  He was switched from Xarelto to Pradaxa.  He is not currently on diuretics.  His potassium was 5.4 on May 5, and on recheck today it had gone up to 6.4.  He was therefore referred to the emergency department for further evaluation.      Allergies:  No Known Drug Allergies     Medications:    Albuterol  Aspirin 81 mg   Pulmicort  Pradaxa  Lasix  Duoneb  Lisinopril  Lopressor  Singulair  Zocor     Past Medical History:    Allergic rhinitis  Anemia  Aortic stenosis  Benign neoplasm of colon  Chronic kidney disease  Coronary atherosclerosis   Hyperlipidemia  Hypertension  Localized osteoarthrosis  Asthma  Hearing loss   Osteoporosis  non rheumatic aortic valve stenosis   Iron deficiency anemia  Vitamin b12 deficiency  Acute respiratory failure  Acute on chronic congestive heart failure   Coronary artery disease     Past Surgical History:    Coronary artery bypass  Cardiac surgery  Angiogram coronary graft  Coronary angiogram  Right heart catheterization  Colonoscopy thru stoma with biopsy/cautery/tumor/polyp/lesion x2  Esophagoscopy, diagnostic   herniorrhaphy inguinal  Laparoscopic cholecystectomy     Family History:    Father: coronary artery disease  Mother: breast cancer  Son: cerebrovascular disease  Brother: heart disease    Social History:  The patient presents to the ED alone.  Smoking Status: Former Smoker   Types: Cigarettes  Smokeless  Tobacco: Never Used  Alcohol Use: Negative  Drug Use: Negative  PCP: Dany Rodriguez  Marital Status:        Review of Systems   All other systems reviewed and are negative.    Physical Exam     Patient Vitals for the past 24 hrs:   BP Temp Temp src Pulse Resp SpO2   05/11/20 1330 -- -- -- 57 -- 98 %   05/11/20 1315 -- -- -- -- -- 98 %   05/11/20 1300 (!) 158/91 97.5  F (36.4  C) Oral 52 18 91 %     Physical Exam  General: Chronically ill-appearing male sitting upright in room 30  HENT: mucous membranes moist  CV: rate as above, regular rhythm, no lower extremity edema, murmur  Resp: unlabored   GI: abdomen soft and nontender, no guarding  MSK: no bony tenderness  Skin: appropriately warm and dry  Neuro: alert, extremely [place], oriented though poor historian  Psych: normal mood and affect      Emergency Department Course     ECG:  ECG taken at 1354, ECG read at 1357  Atrial fibrillation with slow ventricular response  Right bundle branch block (old)  Left anterior fascicular block  Bifascicular block (old)  Abnormal ECG  No peaked T waves  Rate 56 bpm. UT interval * ms. QRS duration 162 ms. QT/QTc 452/436 ms. P-R-T axes * -70 34.    Laboratory:  Laboratory findings were communicated with the patient who voiced understanding of the findings.    CBC: WBC 5.8, HGB 12.0 (L),  (L)  BMP:  (L), Potassium 6.8 (HH), BUN 54 (H), Creatinine 1.86 (H)    Glucose by Meter (Collected 1429): 217 (H)    Interventions:  1405 Dextrose 50% 25 mg IV  1406 Insulin 6.9 Units IV  1410 Lasix 40 mg IV  1412 Dextrose 10% IV    Emergency Department Course:    1258 Nursing notes and vitals reviewed. I performed an exam of the patient as documented above.     1311 IV was inserted and blood was drawn for laboratory testing, results above.    1354 EKG obtained as noted above.    1419 I spoke with the patient's son regarding patient's presentation, findings, and plan of care.    1429 Glucose by meter obtained as noted  above.    I spoke with Dr. Giron of the hospitalist service from [facility] regarding patient's presentation, findings, and plan of care.    Findings and plan explained to the patient who consents to admission. Discussed the patient with Dr. Giron, who will admit the patient to a monitored bed for further monitoring, evaluation, and treatment.    Impression & Plan    Medical Decision Making:  He has worsening hyperkalemia, though fortunately does not have EKG findings or other symptoms at this time.  Given this potentially life-threatening rhythm, I think that hospitalization for expedited treatment and work-up is indicated.  No evidence of hemolysis on multiple lab checks which have confirmed hyperkalemia.  Adrenal insufficiency remains on the differential given concomitant hyponatremia.  Blood pressure is satisfactory.  I spoke with his family.  He is full code.  He was admitted to a monitored bed under the care of the hospitalist service.    Diagnosis:    ICD-10-CM    1. Hyperkalemia  E87.5    2. Hyponatremia  E87.1    3. Renal insufficiency  N28.9    4. Aortic valve stenosis, etiology of cardiac valve disease unspecified  I35.0      Disposition:   The patient is admitted into the care of Dr. Giron.    Scribe Disclosure:  I, Marina Yaakov, am serving as a scribe at 1:04 PM on 5/11/2020 to document services personally performed by Guicho Brand MD based on my observations and the provider's statements to me.    This record was created at least in part using electronic voice recognition software, so please excuse any typographical errors.     EMERGENCY DEPARTMENT       Guicho Brand MD  05/11/20 7311

## 2020-05-11 NOTE — H&P
Admitted:     05/11/2020      CHIEF COMPLAINT:  Abnormal labs.      PRIMARY CARDIOLOGY PROVIDER:  Amee Rocha PA-C      HISTORY OF PRESENT ILLNESS:  Shiraz Ingram is a very pleasant 85-year-old male with a complex past medical history which includes coronary artery disease with history of remote CABG in the early 2000s, hypertension, hyperlipidemia, atrial fibrillation on chronic anticoagulation, severe aortic stenosis with upcoming plans for TAVR, moderate persistent asthma and not O2 dependent at baseline, and stage III CKD with a baseline creatinine of 1.4, among other medical problems, who presents to the emergency room today for evaluation of abnormal labs.  The patient is extremely hard of hearing and requires the use of a  or writing in order to communicate.  As such, most of my history was gleaned through chart review and discussions with Dr. Brand, ED provider.      The patient was recently hospitalized at [facility] from 4/23/20 - 4/28/20 for issues related to acute hypoxic respiratory failure secondary to an exacerbation of heart failure in the setting of his known severe aortic stenosis.  During that hospitalization, he was diuresed with IV Lasix (with 12 lb wt loss) and ultimately transitioned to Lasix 40 mg p.o. daily at the time of discharge.  His anticoagulant was changed from Xarelto to Pradaxa.  He was continued on his lisinopril 20 mg daily and metoprolol 25 mg b.i.d.  He was placed on steroids for management of a possible component of an asthma exacerbation and his inhalers and nebulizer were continued.     He has been following closely with [facility] since the time of his discharge.  His sons, Nicko and Forrest, are very involved in the patient's care.  He had a virtual visit with Amee Rocha PA-C of [facility] on 5/0/20.  It was noted at that visit that he had several abnormalities on a basic metabolic panel.  At the time of his discharge from the  hospital on 4/27, his creatinine was 1.6, potassium was 4.2 and sodium was 136.  On labs that were rechecked on 5/5 (in the setting of ongoing use of lisinopril and initiation of oral Lasix), his creatinine was 2.34 with a BUN of 81, a potassium was 5.4 and a sodium was 132.  At that visit, he was told to stop taking his Lasix and his lisinopril dose was reduced to 10 mg daily.  He had a virtual followup visit on 5/8/20.  He had no symptoms suggestive of any CHF exacerbation.  His weight was stable.  His blood pressures were controlled, maybe a little bit on the high side, but otherwise stable and his sons have been cooking for him and making sure he adheres to a low-sodium diet.  No changes were made to his plan at that time.  He was recommended to remain off Lasix and continue a decreased dose of lisinopril and will repeat labs were ordered for Monday 5/11.      The patient followed up with [facility] today as was scheduled with basic labs.  Labs today were notable for a creatinine of 1.9 with a BUN of 52.  His potassium was elevated at 6.6 and his sodium was 130.  There was no evidence of hemolyzation so he was referred to the emergency room for further evaluation.      In the emergency room, he was seen and evaluated by Dr. Brand.  He was afebrile.  Blood pressures were stable in the 110s-130s.  His heart rates were in the 40s-50s.  O2 sats were stable in the upper 90s on room air.  Repeat labs showed a potassium of 6.8, creatinine 1.86.  His hemoglobin is low but stable at 12.0.  An EKG was obtained.  It did not show any overt findings of hyperkalemia.  His T waves did not appear peaked.  He was in atrial fibrillation (has known chronic atrial fibrillation) with heart rates in the 50s and a known bifascicular block.  In the emergency room, he was initiated on treatment for hyperkalemia including 40 mg of IV Lasix x1, 6.9 units of insulin in a D5W infusion.  Plan at this juncture is to admit him to the  [facility] for further evaluation and care.      I saw the patient this afternoon.  I communicated with him via writing.  At present, he denies complaints including any pain, shortness of breath or generalized weakness.  He has been compliant with medication recommendations per Cardiology.  He understands he will be admitted today for ongoing management of his  high potassium levels.      PAST MEDICAL HISTORY:   1.  Coronary artery disease with history of CABG in 2002.   2.  Hypertension.   3.  Hyperlipidemia.   4.  Atrial fibrillation, on chronic anticoagulation, was recently changed to Pradaxa.   5.  Known left atrial appendage clot.  This was seen on CT scan in 02/2020 for workup of his TAVR.  He is on chronic anticoagulation.   6.  Chronic diastolic congestive heart failure with recent exacerbation, prompting hospitalization as per HPI.   7.  Known bifascicular block with right bundle branch block and left anterior hemiblock.   8.  Chronic obstructive pulmonary disease versus moderate persistent asthma.  He follows with Pulmonology.  Does not require supplemental O2 at baseline.  He received steroids during his recent hospitalization for presumed exacerbation.   9.  Stage III chronic kidney disease.  Baseline creatinine is around 1.4.   10.  Chronic anemia and thrombocytopenia.   11.  Profound senile hearing loss.  The patient communicates via sign language and writing.   12.  History of pulmonary nodules.  These were noted on serial scans dating back to 2004 and have remained stable.   13.  Renal lesions.  These were also noted on a recent CT scan in 02/2020 and described as simple and minimal complex hemorrhagic cysts with no followup needed.   14.  Osteoarthritis.   15.  Allergic rhinitis.      PAST SURGICAL HISTORY:    1.  CABG in 2002.  2.  Inguinal hernia repair in 2016.  3.  Laparoscopic cholecystectomy in 2003.      FAMILY HISTORY:  Mother had a history of breast cancer and father had a history of coronary  artery disease.      SOCIAL HISTORY:  He has a history of tobacco use.  He smoked for a minimally while he was in his 20s, but none since.  He denies alcohol use.  He lives at home.  His sons are very involved in his care.      HOME MEDICATIONS:   1.  Albuterol inhaler 1-2 puffs every 6 hours as needed.   2.  Aspirin 81 mg daily.   3.  Pulmicort nebulizer 0.5 mg b.i.d.   4.  Calcium carbonate and vitamin D supplement daily.   5.  Pradaxa 150 mg b.i.d.   6.  Ferrous sulfate 325 mg b.i.d.   7.  DuoNeb every 4 hours as 4 times daily.   8.  Lisinopril 10 mg daily.   9.  Metoprolol 25 mg b.i.d.   10.  Singulair 10 mg daily.   11.  Multivitamin daily.   12.  Simvastatin 40 mg at bedtime.      ALLERGIES:  THE PATIENT HAS NO KNOWN DRUG ALLERGIES.      REVIEW OF SYSTEMS:  A full 10-point review of systems was obtained and negative unless otherwise stated per HPI.      PHYSICAL EXAMINATION:   VITAL SIGNS:  Temperature 97.5, pulse 53, respirations 10, blood pressure 163/91, O2 sat of 99% on room air.   GENERAL:  The patient is an elderly-appearing male lying quietly on gurney, alert, answering questions appropriately as able, in no acute distress.   HEENT:  Pupils equal, round, reactive to light and accommodation.  Extraocular movements are intact.  Mucous membranes are moist.   CARDIOVASCULAR:  Heart rate and rhythm are bradycardic and irregular with a profound systolic murmur along the sternal border.  No peripheral edema.   RESPIRATORY:  Lungs are clear to auscultation bilaterally.  No wheezes, rales or rhonchi, no increased work of breathing or accessory muscle use.   ABDOMEN:  Soft, nontender, nondistended, positive bowel sounds throughout.   INTEGUMENTARY:  Warm and dry, no rashes, jaundice or ecchymosis.      LABORATORY DATA:  Labs today obtained in the emergency room shows sodium of 128, potassium of 6.8, bicarbonate 26, BUN of 54, creatinine 1.86, calcium of 9.8, a glucose of 89.  CBC showed white count of 5.8,  hemoglobin 12.0 and a platelet count of 120 with an MCV of 64.  EKG shows chronic atrial fibrillation with a heart rate of 56, a known bifascicular block and no peaked T waves.      ASSESSMENT AND PLAN:  Shiraz Ingram is an 85-year-old male with complex past medical history which includes coronary artery disease with remote history of coronary artery bypass grafting, hypertension, hyperlipidemia, chronic atrial fibrillation on anticoagulation with Pradaxa, known left atrial appendage clot, severe aortic stenosis with upcoming plans for TAVR, stage III CKD, moderate persistent asthma, chronic anemia and thrombocytopenia, among other medical problems, who was referred to the emergency room today for evaluation of worsening hyperkalemia which was noted on outpatient labs obtained per his cardiologist.  He will be admitted to the hospital today for ongoing evaluation and care.   1.  Hyperkalemia:  He has had persistent hyperkalemia since his recent discharge from the hospital on 4/28/20.  During that hospitalization, he was maintained on lisinopril and oral Lasix was added to his regimen for fluid management.  His potassium has been steadily increasing --  was 5.4 on BMP on 5/5.  At that time, his lisinopril dose was decreased from 20 mg daily to 10 mg daily and Lasix was stopped.  On repeat labs today, despite these medication changes, his hyperkalemia has worsened and his potassium is now 6.8.  His renal function has been variable during this time, but is worse than his baseline with a creatinine of 1.86 today (peaked at 2.34 on 5/5).  I suspect his hyperkalemia today is due in part to ongoing use of lisinopril in the setting of his poor renal function.  No other culprit is a clear at this time.  His EKG is stable.  He has been given insulin and IV fluids in the emergency room as well as 40 mg of IV Lasix; awaiting response to the Lasix.  At this juncture, I am also inclined to give him some Kayexalate.  Will repeat lab  this evening.  I have placed a consult to Nephrology Service and their assistance with ongoing management of his hyperkalemia and plan for medications going forward is much appreciated.  He will be monitored on telemetry.   2.  Chronic diastolic congestive heart failure with recent exacerbation:  Most recent echocardiogram in 1/2020 showed an intact ejection fraction.  No specific mention of diastolic dysfunction.  He was started on Lasix 40 mg p.o. daily at the time of his last discharge.  His weights have remained stable it appears, and he exhibits no signs and symptoms suggestive of any exacerbation at this time.  Per Cardiology Service, his dry weight appears to be 155-157 pounds.  He was given a dose of Lasix today in part for treatment of his hyperkalemia.  Monitor respiratory status.   3.  Coronary artery disease with history of CABG, hypertension, hyperlipidemia:  His lisinopril dose was recently decreased given his hyperkalemia.  Will stop his lisinopril altogether today.  His statin will be held given his acute illness, but I anticipate this can be resumed at discharge.  His metoprolol will be continued.  He was started on a low-dose aspirin during his recent hospitalization and this can be continued.   4.  Permanent atrial fibrillation on chronic anticoagulation:  Will continue his metoprolol although I have written for hold parameters as his heart rates in the emergency room are in the 40s-50s.  He is otherwise asymptomatic, on chronic anticoagulation with Pradaxa, which will be continued.     5.  Known left atrial appendage clot:  Chronic and stable on Pradaxa.   6.  Severe aortic stenosis:  The patient is scheduled to undergo a TAVR per Dr. Samuel and his Cardiology team on 06/09/2020.  No concerns at this time.  Monitor volume status.  Continue to follow with Cardiology following hospitalization.   7.  Stage III chronic kidney disease, with acute kidney injury this stay.  Baseline creatinine was  reportedly around 1.4.  Creatinine has been variable since recent discharge in the setting of use of medications including Lasix and lisinopril.  Creatinine today is 1.86.  His lisinopril will be held as part of treatment for his hyperkalemia.  He has been given some gentle IV fluids as well as some Lasix.  Ongoing recommendations per Nephrology service as above.   8.  Asthma, reported as moderate persistent asthma:  He was treated for an exacerbation during recent hospitalization.  Lungs are clear today with no wheezing or concerning findings for an exacerbation at this time.  Continue home inhalers as prescribed.   9.  Chronic anemia and thrombocytopenia:  Counts remain at baseline today.  No additional concerns.  Continue oral iron once condition stabilizes or resume at discharge as needed.   10.  Pulmonary nodules:  Noted on scans and have remained stable over a period of time.  No concerns at this time.   11.  Renal simple and complex hemorrhagic cysts:  Also noted on imaging during recent stay.  No concerns at this time.   12.  Senile hearing loss:  Communicates with use of a  and through writing.  Staff will be notified to arrange for a  for cares going forward.     Deep venous thrombosis prophylaxis:  Continues on Pradaxa as prior to admission.     Code status:  Full code.  This was discussed by Dr. Brand and patient's son, Forrest, at the time of admission.         ANDREIA PATIÑO DO             D: 2020   T: 2020   MT: JOSE JAUN      Name:     SRIKANTH OBANDO   MRN:      [id number]        Account:      [id number]   :      1934        Admitted:     2020                   Document: [id number]       cc: Dany Rocha PA-C

## 2020-05-11 NOTE — PROGRESS NOTES
BMP repeated in ED and confirms hyperkalemia. FYI to Amee Rocha PAC. ED plan of care pending, documentation incomplete.    Component      Latest Ref Rng & Units 5/11/2020 5/11/2020          10:53 AM  1:11 PM   Sodium      133 - 144 mmol/L 130 (L) 128 (L)   Potassium      3.4 - 5.3 mmol/L 6.6 (HH) 6.8 (HH)   Chloride      94 - 109 mmol/L 98 98   Carbon Dioxide      20 - 32 mmol/L 27 26   Anion Gap      3 - 14 mmol/L Not Calculated 4   Glucose      70 - 99 mg/dL 100 86   Urea Nitrogen      7 - 30 mg/dL 52 (H) 54 (H)   Creatinine      0.66 - 1.25 mg/dL 1.92 (H) 1.86 (H)   GFR Estimate      >60 mL/min/1.73:m2 33 (L) 32 (L)   GFR Estimate If Black      >60 mL/min/1.73:m2 40 (L) 37 (L)   Calcium      8.5 - 10.1 mg/dL 10.5 9.8       Elly Osman RN BSN   1:49 PM 05/11/20

## 2020-05-11 NOTE — ED NOTES
DATE:  5/11/2020   TIME OF RECEIPT FROM LAB:  1:46 PM  LAB TEST:  Potassium: 6.8   LAB VALUE:  See above  RESULTS GIVEN WITH READ-BACK TO (PROVIDER):  Guicho Brand, *  TIME LAB VALUE REPORTED TO PROVIDER:   1:46 PM

## 2020-05-11 NOTE — PROGRESS NOTES
"[facility]    Pt's son Willi called me to alert us that pt is being admitted to [facility].    Willi stated that the ED provider had called him to ask about how aggressive pt would like to be in terms of care; I presume that the conversation was about code status, but Willi unable to fully articulate this.    I reviewed with Willi that each patient who is admitted to hospital needs their wishes addressed in terms of what medical team would do if patient's heart or breathing stopped, including would patient want breathing tube, heart shocked, CPR. Willi told me that pt would want those things, however would not want to \"live on machines\" for a prolonged time. I called and spoke to Judy ED RN and updated her on our conversation; she noted that hospitalist will address code status.    I advised Willi that CORE RN will watch pt's chart and assist with discharge CORE appt. I asked him to call CORE RN with questions/concerns.    Elly Osman RN BSN   3:14 PM 05/11/20        "

## 2020-05-11 NOTE — TELEPHONE ENCOUNTER
Prior Authorization Not Needed per Insurance    Medication: pradaxa 150 blister pack - NOT NEEDED  Insurance Company: [employer] - Phone [phone] Fax [phone]  Expected CoPay:      Pharmacy Filling the Rx: [id number] [address] AT [address]  Pharmacy Notified: Yes  Patient Notified: Comment:  **Instructed pharmacy to notify patient when script is ready to /ship.**

## 2020-05-12 LAB
ALBUMIN UR-MCNC: NEGATIVE MG/DL
ANION GAP SERPL CALCULATED.3IONS-SCNC: 4 MMOL/L (ref 3–14)
ANION GAP SERPL CALCULATED.3IONS-SCNC: 7 MMOL/L (ref 3–14)
APPEARANCE UR: CLEAR
BILIRUB UR QL STRIP: NEGATIVE
BUN SERPL-MCNC: 47 MG/DL (ref 7–30)
BUN SERPL-MCNC: 52 MG/DL (ref 7–30)
CALCIUM SERPL-MCNC: 9 MG/DL (ref 8.5–10.1)
CALCIUM SERPL-MCNC: 9.1 MG/DL (ref 8.5–10.1)
CHLORIDE SERPL-SCNC: 94 MMOL/L (ref 94–109)
CHLORIDE SERPL-SCNC: 94 MMOL/L (ref 94–109)
CO2 SERPL-SCNC: 25 MMOL/L (ref 20–32)
CO2 SERPL-SCNC: 27 MMOL/L (ref 20–32)
COLOR UR AUTO: YELLOW
CREAT SERPL-MCNC: 1.86 MG/DL (ref 0.66–1.25)
CREAT SERPL-MCNC: 1.91 MG/DL (ref 0.66–1.25)
ERYTHROCYTE [DISTWIDTH] IN BLOOD BY AUTOMATED COUNT: 15.9 % (ref 10–15)
GFR SERPL CREATININE-BSD FRML MDRD: 31 ML/MIN/{1.73_M2}
GFR SERPL CREATININE-BSD FRML MDRD: 32 ML/MIN/{1.73_M2}
GLUCOSE BLDC GLUCOMTR-MCNC: 107 MG/DL (ref 70–99)
GLUCOSE BLDC GLUCOMTR-MCNC: 116 MG/DL (ref 70–99)
GLUCOSE BLDC GLUCOMTR-MCNC: 121 MG/DL (ref 70–99)
GLUCOSE BLDC GLUCOMTR-MCNC: 154 MG/DL (ref 70–99)
GLUCOSE BLDC GLUCOMTR-MCNC: 157 MG/DL (ref 70–99)
GLUCOSE BLDC GLUCOMTR-MCNC: 171 MG/DL (ref 70–99)
GLUCOSE BLDC GLUCOMTR-MCNC: 94 MG/DL (ref 70–99)
GLUCOSE SERPL-MCNC: 101 MG/DL (ref 70–99)
GLUCOSE SERPL-MCNC: 95 MG/DL (ref 70–99)
GLUCOSE UR STRIP-MCNC: NEGATIVE MG/DL
HCT VFR BLD AUTO: 35.2 % (ref 40–53)
HGB BLD-MCNC: 11.1 G/DL (ref 13.3–17.7)
HGB UR QL STRIP: NEGATIVE
KETONES UR STRIP-MCNC: NEGATIVE MG/DL
LEUKOCYTE ESTERASE UR QL STRIP: NEGATIVE
MCH RBC QN AUTO: 20.1 PG (ref 26.5–33)
MCHC RBC AUTO-ENTMCNC: 31.5 G/DL (ref 31.5–36.5)
MCV RBC AUTO: 64 FL (ref 78–100)
NITRATE UR QL: NEGATIVE
OSMOLALITY SERPL: 283 MMOL/KG (ref 280–301)
OSMOLALITY UR: 312 MMOL/KG (ref 100–1200)
PH UR STRIP: 6.5 PH (ref 5–7)
PLATELET # BLD AUTO: 107 10E9/L (ref 150–450)
POTASSIUM SERPL-SCNC: 4.4 MMOL/L (ref 3.4–5.3)
POTASSIUM SERPL-SCNC: 5.3 MMOL/L (ref 3.4–5.3)
POTASSIUM SERPL-SCNC: 5.5 MMOL/L (ref 3.4–5.3)
POTASSIUM SERPL-SCNC: 6.1 MMOL/L (ref 3.4–5.3)
POTASSIUM SERPL-SCNC: 6.8 MMOL/L (ref 3.4–5.3)
RBC # BLD AUTO: 5.52 10E12/L (ref 4.4–5.9)
RBC #/AREA URNS AUTO: 1 /HPF (ref 0–2)
SODIUM SERPL-SCNC: 125 MMOL/L (ref 133–144)
SODIUM SERPL-SCNC: 126 MMOL/L (ref 133–144)
SODIUM UR-SCNC: 28 MMOL/L
SOURCE: NORMAL
SP GR UR STRIP: 1.01 (ref 1–1.03)
SQUAMOUS #/AREA URNS AUTO: <1 /HPF (ref 0–1)
UROBILINOGEN UR STRIP-MCNC: NORMAL MG/DL (ref 0–2)
WBC # BLD AUTO: 4.3 10E9/L (ref 4–11)
WBC #/AREA URNS AUTO: 1 /HPF (ref 0–5)

## 2020-05-12 PROCEDURE — 25800030 ZZH RX IP 258 OP 636: Performed by: INTERNAL MEDICINE

## 2020-05-12 PROCEDURE — 93010 ELECTROCARDIOGRAM REPORT: CPT | Performed by: INTERNAL MEDICINE

## 2020-05-12 PROCEDURE — 00000146 ZZHCL STATISTIC GLUCOSE BY METER IP

## 2020-05-12 PROCEDURE — 80048 BASIC METABOLIC PNL TOTAL CA: CPT | Performed by: INTERNAL MEDICINE

## 2020-05-12 PROCEDURE — 25000132 ZZH RX MED GY IP 250 OP 250 PS 637: Performed by: INTERNAL MEDICINE

## 2020-05-12 PROCEDURE — 36415 COLL VENOUS BLD VENIPUNCTURE: CPT | Performed by: INTERNAL MEDICINE

## 2020-05-12 PROCEDURE — 25000125 ZZHC RX 250: Performed by: INTERNAL MEDICINE

## 2020-05-12 PROCEDURE — 21000001 ZZH R&B HEART CARE

## 2020-05-12 PROCEDURE — 40000275 ZZH STATISTIC RCP TIME EA 10 MIN

## 2020-05-12 PROCEDURE — 25000128 H RX IP 250 OP 636: Performed by: INTERNAL MEDICINE

## 2020-05-12 PROCEDURE — 99233 SBSQ HOSP IP/OBS HIGH 50: CPT | Performed by: INTERNAL MEDICINE

## 2020-05-12 PROCEDURE — 94640 AIRWAY INHALATION TREATMENT: CPT | Mod: 76

## 2020-05-12 PROCEDURE — 81001 URINALYSIS AUTO W/SCOPE: CPT | Performed by: INTERNAL MEDICINE

## 2020-05-12 PROCEDURE — 84132 ASSAY OF SERUM POTASSIUM: CPT | Performed by: INTERNAL MEDICINE

## 2020-05-12 PROCEDURE — 84300 ASSAY OF URINE SODIUM: CPT | Performed by: INTERNAL MEDICINE

## 2020-05-12 PROCEDURE — 25000132 ZZH RX MED GY IP 250 OP 250 PS 637: Performed by: HOSPITALIST

## 2020-05-12 PROCEDURE — 83935 ASSAY OF URINE OSMOLALITY: CPT | Performed by: INTERNAL MEDICINE

## 2020-05-12 PROCEDURE — 25800025 ZZH RX 258: Performed by: INTERNAL MEDICINE

## 2020-05-12 PROCEDURE — 85027 COMPLETE CBC AUTOMATED: CPT | Performed by: INTERNAL MEDICINE

## 2020-05-12 PROCEDURE — 94640 AIRWAY INHALATION TREATMENT: CPT

## 2020-05-12 PROCEDURE — 83930 ASSAY OF BLOOD OSMOLALITY: CPT | Performed by: INTERNAL MEDICINE

## 2020-05-12 PROCEDURE — 25000131 ZZH RX MED GY IP 250 OP 636 PS 637: Performed by: INTERNAL MEDICINE

## 2020-05-12 PROCEDURE — 93005 ELECTROCARDIOGRAM TRACING: CPT

## 2020-05-12 RX ORDER — FUROSEMIDE 10 MG/ML
40 INJECTION INTRAMUSCULAR; INTRAVENOUS ONCE
Status: COMPLETED | OUTPATIENT
Start: 2020-05-12 | End: 2020-05-12

## 2020-05-12 RX ORDER — DEXTROSE MONOHYDRATE 25 G/50ML
25 INJECTION, SOLUTION INTRAVENOUS ONCE
Status: COMPLETED | OUTPATIENT
Start: 2020-05-12 | End: 2020-05-12

## 2020-05-12 RX ORDER — SODIUM CHLORIDE 9 MG/ML
INJECTION, SOLUTION INTRAVENOUS CONTINUOUS
Status: DISCONTINUED | OUTPATIENT
Start: 2020-05-12 | End: 2020-05-13

## 2020-05-12 RX ORDER — SODIUM POLYSTYRENE SULFONATE 15 G/60ML
30 SUSPENSION ORAL; RECTAL ONCE
Status: COMPLETED | OUTPATIENT
Start: 2020-05-12 | End: 2020-05-12

## 2020-05-12 RX ORDER — DEXTROSE MONOHYDRATE 100 MG/ML
INJECTION, SOLUTION INTRAVENOUS CONTINUOUS
Status: DISCONTINUED | OUTPATIENT
Start: 2020-05-12 | End: 2020-05-14

## 2020-05-12 RX ORDER — NICOTINE POLACRILEX 4 MG
15-30 LOZENGE BUCCAL
Status: DISCONTINUED | OUTPATIENT
Start: 2020-05-12 | End: 2020-05-14 | Stop reason: HOSPADM

## 2020-05-12 RX ORDER — DEXTROSE MONOHYDRATE 25 G/50ML
25-50 INJECTION, SOLUTION INTRAVENOUS
Status: DISCONTINUED | OUTPATIENT
Start: 2020-05-12 | End: 2020-05-14 | Stop reason: HOSPADM

## 2020-05-12 RX ORDER — ALBUTEROL SULFATE 5 MG/ML
10 SOLUTION RESPIRATORY (INHALATION) ONCE
Status: COMPLETED | OUTPATIENT
Start: 2020-05-12 | End: 2020-05-12

## 2020-05-12 RX ORDER — LIDOCAINE 40 MG/G
CREAM TOPICAL
Status: DISCONTINUED | OUTPATIENT
Start: 2020-05-12 | End: 2020-05-14 | Stop reason: HOSPADM

## 2020-05-12 RX ORDER — NITROGLYCERIN 0.4 MG/1
0.4 TABLET SUBLINGUAL EVERY 5 MIN PRN
Status: DISCONTINUED | OUTPATIENT
Start: 2020-05-12 | End: 2020-05-14 | Stop reason: HOSPADM

## 2020-05-12 RX ADMIN — SODIUM BICARBONATE 50 MEQ: 84 INJECTION INTRAVENOUS at 16:45

## 2020-05-12 RX ADMIN — MONTELUKAST 10 MG: 10 TABLET, FILM COATED ORAL at 09:38

## 2020-05-12 RX ADMIN — SODIUM POLYSTYRENE SULFONATE 30 G: 15 SUSPENSION ORAL; RECTAL at 19:09

## 2020-05-12 RX ADMIN — DABIGATRAN ETEXILATE MESYLATE 75 MG: 75 CAPSULE ORAL at 22:17

## 2020-05-12 RX ADMIN — BUDESONIDE 0.5 MG: 0.5 INHALANT RESPIRATORY (INHALATION) at 22:17

## 2020-05-12 RX ADMIN — SODIUM CHLORIDE, PRESERVATIVE FREE: 5 INJECTION INTRAVENOUS at 13:30

## 2020-05-12 RX ADMIN — FUROSEMIDE 40 MG: 10 INJECTION, SOLUTION INTRAMUSCULAR; INTRAVENOUS at 17:00

## 2020-05-12 RX ADMIN — DEXTROSE MONOHYDRATE 25 G: 25 INJECTION, SOLUTION INTRAVENOUS at 17:09

## 2020-05-12 RX ADMIN — ASPIRIN 81 MG: 81 TABLET, DELAYED RELEASE ORAL at 09:37

## 2020-05-12 RX ADMIN — DABIGATRAN ETEXILATE MESYLATE 75 MG: 75 CAPSULE ORAL at 09:38

## 2020-05-12 RX ADMIN — SODIUM CHLORIDE 7 UNITS: 9 INJECTION, SOLUTION INTRAVENOUS at 16:41

## 2020-05-12 RX ADMIN — METOPROLOL TARTRATE 25 MG: 25 TABLET, FILM COATED ORAL at 09:38

## 2020-05-12 RX ADMIN — ALBUTEROL SULFATE 10 MG: 5 SOLUTION RESPIRATORY (INHALATION) at 17:23

## 2020-05-12 RX ADMIN — CALCIUM GLUCONATE 1 G: 98 INJECTION, SOLUTION INTRAVENOUS at 17:25

## 2020-05-12 RX ADMIN — DEXTROSE MONOHYDRATE: 100 INJECTION, SOLUTION INTRAVENOUS at 18:28

## 2020-05-12 RX ADMIN — SIMVASTATIN 40 MG: 40 TABLET, FILM COATED ORAL at 22:17

## 2020-05-12 RX ADMIN — BUDESONIDE 0.5 MG: 0.5 INHALANT RESPIRATORY (INHALATION) at 08:32

## 2020-05-12 ASSESSMENT — ACTIVITIES OF DAILY LIVING (ADL)
ADLS_ACUITY_SCORE: 14

## 2020-05-12 NOTE — PLAN OF CARE
A&Ox4, hearing impaired. Interpretor at bedside for assessment and med pass. Denies pain or SOB. Clear lungs, RA. Tele controlled AFIB 50's-60's, VSS. Serial potassium levels trending down. Nephrology consult in am.

## 2020-05-12 NOTE — PROVIDER NOTIFICATION
MD Notification    Notified Person: MD    Notified Person Name: Dr Gonzalez    Notification Date/Time: 05/11/20 9:22 PM    Notification Interaction: Text Message    Purpose of Notification: Pt is asking about his simvastatin he takes at bedtime.  It is not ordered.    Orders Received: See order for statin    Comments:

## 2020-05-12 NOTE — PROGRESS NOTES
[facility]    Hospitalist Progress Note    Assessment & Plan   Shiraz Ingram is a 85 year old male who was admitted on 5/11/2020.  He has a complex medical history including CAD, CABG, atrial fibrillation on anticoagulation with Pradaxa, known left atrial appendage clot, severe aortic stenosis upcoming plans for TAVR, stage III CKD, moderate persistent, chronic anemia and thrombocytopenia patient presented with elevated potassium levels from outpatient labs obtained by cardiology team.  Hyperkalemia  --Patient had persistent hyperkalemia since 4/28/2020, part of the problem could be medication adjustments, his lisinopril was decreased to 10 mg and Lasix was stopped, he presented with a potassium of 6.8.  No other medication noted that could cause hyperkalemia.  --Potassium levels improved with management yesterday including Kayexalate  --Nephrology consult pending, continue telemetry monitoring.  Chronic diastolic heart failure with recent exacerbation  CAD with a history of CABG  Hypertension  Hyperlipidemia  Permanent atrial fibrillation on chronic anticoagulation  Known left atrial appendage clot  Severe aortic stenosis with plan for TAVR on 6/9  Bradycardia  --Patient has multiple cardiac issues, he closely follows up with cardiology, plan for TAVR soon, he is anticoagulated with Pradaxa, continue that recently he said Lasix has been stopped and her lisinopril dose has been reduced to 10 mg, patient is currently having hyperkalemia, he also had a episode of bradycardia in the emergency department when his potassium was 6.8, he had another episode  today with heart rate in 30s, discussed with cardiology  --Appreciate cardiology input, they will consult.  --Patient is on beta-blockers, plan is to hold that for now, will have to monitor him on telemetry going forward, if bradycardia is an issue with stable potassium levels will need electrophysiology input.  --Continue aspirin, Pradaxa,  statin  --Patient is on 25 mg of metoprolol twice daily, hold parameters are therefore HR below 60.  Hyponatremia  --Patient was hypo-glycemic following his insulin administration, he was started on 10 percentage dextrose for a short while, possibly leading to hyponatremia, hypervolemia could also be associated issue.  --Fluids changed to normal saline at the rate of 50 mL/h, will stop in few hours depending on improvement of sodium levels, will monitor sodium closely.  Stage III CKD  --Now with hyperkalemia, appreciate nephrology input  Chronic anemia and thrombocytopenia  Profound SNHL hearing loss  --- Communication mostly through writing  History of pulmonary nodules  --His lesions have been stable since 2014  Renal lesions  --These were also noted on a recent CT scan in 02/2020 and described as simple and minimal complex hemorrhagic cysts with no followup needed.   DVT Prophylaxis: Continue Pradaxa  Code Status: Full Code     Disposition: Expected discharge in 1 to 2 days    Teresa Dove MD  Text Page   (7am to 6pm)    Interval History   Patient is resting comfortably, will discussed by writing down, patient is very hard of hearing, nursing had notified me that his heart rate did drop to 30s, 37 while he was resting without any other symptoms.  Appeared to be sinus bradycardia.  Patient is in A. fib with CVR.  -Data reviewed today: I reviewed all new labs and imaging results over the last 24 hours.  Physical Exam   Temp: 96.3  F (35.7  C) Temp src: Axillary BP: 123/58 Pulse: (!) 37 Heart Rate: 55 Resp: 16 SpO2: 98 % O2 Device: None (Room air)    Vitals:    05/12/20 0550   Weight: 70.1 kg (154 lb 8 oz)     Vital Signs with Ranges  Temp:  [96.3  F (35.7  C)-98.1  F (36.7  C)] 96.3  F (35.7  C)  Pulse:  [37-61] 37  Heart Rate:  [49-66] 55  Resp:  [9-18] 16  BP: (104-153)/(44-75) 123/58  SpO2:  [97 %-100 %] 98 %  I/O last 3 completed shifts:  In: 530 [P.O.:530]  Out: 1850 [Urine:1850]    Constitutional: Awake,  alert, cooperative, no apparent distress, patient is very hard of hearing  Respiratory: Clear to auscultation bilaterally, no crackles or wheezing  Cardiovascular: Regular rate and rhythm, normal S1 and S2, and no murmur noted  GI: Normal bowel sounds, soft, non-distended, non-tender  Skin/Integumen: No rashes, no cyanosis, trace edema noted near the ankle  Neuro : moving all 4 extremities, no focal deficit noted     Medications     - MEDICATION INSTRUCTIONS -       sodium chloride 50 mL/hr at 05/12/20 1330       aspirin  81 mg Oral Daily     budesonide  0.5 mg Nebulization BID     dabigatran ANTICOAGULANT  75 mg Oral BID     metoprolol tartrate  25 mg Oral BID     montelukast  10 mg Oral Daily     simvastatin  40 mg Oral At Bedtime     sodium chloride (PF)  3 mL Intracatheter Q8H       Data   Recent Labs   Lab 05/12/20  0709 05/11/20  2330 05/11/20  1806 05/11/20  1311 05/11/20  1053   WBC 4.3  --   --  5.8  --    HGB 11.1*  --   --  12.0* 12.4*   MCV 64*  --   --  64*  --    *  --   --  120*  --    *  --  126* 128* 130*   POTASSIUM 5.5* 5.5* 6.0* 6.8* 6.6*   CHLORIDE 94  --  97 98 98   CO2 27  --  24 26 27   BUN 52*  --  54* 54* 52*   CR 1.91*  --  1.79* 1.86* 1.92*   ANIONGAP 4  --  5 4 Not Calculated   AYALA 9.1  --  9.5 9.8 10.5   GLC 95  --  136* 86 100     Recent Labs   Lab 05/12/20  0709 05/12/20  0152 05/11/20  2123 05/11/20  1806 05/11/20  1627 05/11/20  1530 05/11/20  1454  05/11/20  1311 05/11/20  1053   GLC 95  --   --  136*  --   --   --   --  86 100   BGM  --  94 100*  --  78 89 123*   < >  --   --     < > = values in this interval not displayed.       Imaging:   No results found for this or any previous visit (from the past 24 hour(s)).

## 2020-05-12 NOTE — PROGRESS NOTES
"K 6.8, Na 126     is not present.  Called pt's son Forrest and spoke to him re the persistently high k.  Explained that this is life threatening and we are treating it medically.  Forrest states that pt has expressed that he would like \"everything done, but not be kept on a machine for a prolonged period of time.\"      1.  Med mgmt of high k - ins/glc/ca/bicarb/furos/kayexalate.  D/w pt's RN and charge RN.  2.  Stat ECG - compared to 2 previous ECGs (yday when k 6.8 and 4/23 when k was 4.3) and the only change appears to be peaked Ts now.  3.  D/w Dr. Dove (hospitalist [facility]) who will transfer pt to [facility] for closer monitoring.  4.  Full code.  Forrest (son) is listed as the emergency contact and he states that he should be the first call if pt's condition worsens.  "

## 2020-05-12 NOTE — PROGRESS NOTES
MD Notification    Notified Person: MD     Notified Person Name: Dr. Moncada    Notification Date/Time: 05/12/20   5:08 PM    Notification Interaction: phone    Purpose of Notification: K of 6.1    Orders Received: Dr. Moncada ordered K exalate.     Comments: MD aware and conversed with Dr. Dove with plans to transfer pt to [facility].

## 2020-05-12 NOTE — PLAN OF CARE
A/O x4, hearing impairment,  utilized. AVSS on RA ex mild HTN and bradycardic at times. Tele A-fib w/BBB, had episode of bradycardia, 37bpm - hospitalist aware, cardiology consulted, orders to hold beta blocker and EP consult. K+ 6.8, recheck 2045, hospitalist and nephrology awere - see note. Denies pain. Up SBA/independently to BR, voiding adequately, BM x2, black/green stool. 2gram NA+/2g K+ diet, good appetite, last , recheck 2115. Nephrology following. Transferred to CCU, report given to nurse.

## 2020-05-12 NOTE — PROVIDER NOTIFICATION
MD Notification    Notified Person: MD    Notified Person Name: Dr. Dove    Notification Date/Time:5/12/2020 at 1302    Notification Interaction: Text paged    Purpose of Notification: HR 37 on tele monitor.  BP stable and pt asymptomatic.  Would you like cardiology consult?    Orders Received:    Comments:

## 2020-05-12 NOTE — CONSULTS
[facility]    RENAL CONSULTATION NOTE    REFERRING MD:  Radha Giron MD    REASON FOR CONSULTATION:  Teresa, RD    DATE OF CONSULTATION: 05/12/20    SHORTHAND KEY FOR MY NOTES:  c = with, s = without, p = after, a = before, x = except, asx = asymptomatic, tx = transplant or treatment, sx = symptoms or symptomatic, cx = canceled or culture, rxn = reaction, yday = yesterday, nl = normal, abx = antibiotics, fxn = function, dx = diagnosis, dz = disease, m/h = melena/hematochezia, c/d/l/ha = cramping/dizziness/lightheadedness/headache, d/c = discharge or diarrhea/constipation, f/c/n/v = fevers/chills/nausea/vomiting, cp/sob = chest pain/shortness of breath, tbv = total body volume, rxn = reaction, tdc = tunneled dialysis catheter, pta = prior to admission, hd = hemodialysis, pd = peritoneal dialysis, hhd = home hemodialysis    HPI: Shiraz Ingram is a 85 year old male c CKD III likely 2 HTN / cystic kidneys who was admitted on 5/11/2020 c severe hyperkalemia.  Pt seen c an  and hx obtained from him and the chart.    Pt was recently hosp at [facility] c a CHF exacerbation and was discharged c oral furos in addition to his lisin.  At the time of discharge, his labs showed a cr 1.6, k 4.2, na 136, co2 35.  At his first f/u c [facility], the labs showed a cr 2.3, k 5.4, na 132, co2 29.  At that time, the furos was stopped and lisin was decreased.  Subsequently, he had labs done again on 5/11 and the k was 6.6 and na 130, so he was sent to the ER for further investigation and was admitted.    The pt rec'd med mgmt and kayexalate and today his k is 5.5.  Over the past few days, the na has dropped from 130 to 125.  He denies any cp/sob/abd pain.  No f/c/n/v/itching.  Denies any twitching/confusion/weakness.  He is urinating ok - no d/h and no significant prostatic sx.  He denies any d/l/ha and his appetite is ok c nl taste.  He isn't thirsty and denies excessively drinking water here - only 1  pitcher a day.  Denies any GIB sx, but RN notes that his stools are black/green in color.    Regarding diet, the pt states he was eating a lot of potatoes c hamburger lately.  He doesn't drink much OJ, but does drink a large glass of milk each day.  He drinks 1-2 bottles of water at home.      ROS:  A complete review of systems was performed and is x as noted above.    PMH:    Past Medical History:   Diagnosis Date     Allergic rhinitis, cause unspecified      Anemia, unspecified      Aortic stenosis, mild      Benign neoplasm of colon 1999    Ademonatous polyp     CKD (chronic kidney disease) stage 3, GFR 30-59 ml/min (H) 5/12/2011     Coronary atherosclerosis of unspecified type of vessel, native or graft     s/p CABG 2002     Hyperlipidemia LDL goal < 100      Hypertension goal BP (blood pressure) < 140/90      Localized osteoarthrosis not specified whether primary or secondary, lower leg     left knee     Moderate persistent asthma      Unspecified hearing loss      PSH:    Past Surgical History:   Procedure Laterality Date     C NONSPECIFIC PROCEDURE  5/02    Coronary artery bypass     CARDIAC SURGERY       CV ANGIOGRAM CORONARY GRAFT N/A 4/24/2020    Procedure: Angiogram Coronary Graft;  Surgeon: Addison Willard MD;  Location: [facility] CARDIAC CATH LAB     CV CORONARY ANGIOGRAM N/A 4/24/2020    Procedure: Coronary Angiogram;  Surgeon: Addison Willard MD;  Location: [facility] CARDIAC CATH LAB     CV RIGHT HEART CATH N/A 4/24/2020    Procedure: Right Heart Cath;  Surgeon: Addison Willard MD;  Location:  HEART CARDIAC CATH LAB     HC COLONOSCOPY THRU STOMA W BIOPSY/CAUTERY TUMOR/POLYP/LESION  5/03    Dr. Dow, Q 5 years     HC COLONOSCOPY THRU STOMA W BIOPSY/CAUTERY TUMOR/POLYP/LESION  12/199    Dr. Dow, one adenomatous polyp     HC ESOPHAGOSCOPY, DIAGNOSTIC  5/03    Dr. Dow, lower esophageal ring & mild gastritis     HERNIORRHAPHY INGUINAL Right 9/26/2016    Procedure: HERNIORRHAPHY  INGUINAL;  Surgeon: Alexis Alexandra MD;  Location: [facility]     LAPAROSCOPIC CHOLECYSTECTOMY      for biliary sludge     MEDICATIONS:      aspirin  81 mg Oral Daily     budesonide  0.5 mg Nebulization BID     dabigatran ANTICOAGULANT  75 mg Oral BID     metoprolol tartrate  25 mg Oral BID     montelukast  10 mg Oral Daily     simvastatin  40 mg Oral At Bedtime     sodium chloride (PF)  3 mL Intracatheter Q8H     ALLERGIES:    Allergies as of 2020 - Reviewed 2020   Allergen Reaction Noted     No known drug allergies  06/10/2004     FH:    Family History   Problem Relation Age of Onset     C.A.D. Father      Cancer Mother         breast cancer,  of pneumonia     Cerebrovascular Disease Son      CABG Son      Family History Negative Child      Family History Negative Sister      Heart Disease Brother      SH:    Social History     Socioeconomic History     Marital status:      Spouse name: Kailey     Number of children: 3     Years of education: Not on file     Highest education level: Not on file   Occupational History     Occupation: [employer]     Employer: RETIRED   Social Needs     Financial resource strain: Not on file     Food insecurity     Worry: Not on file     Inability: Not on file     Transportation needs     Medical: Not on file     Non-medical: Not on file   Tobacco Use     Smoking status: Former Smoker     Types: Cigarettes     Smokeless tobacco: Never Used     Tobacco comment: smoked for a week in 's   Substance and Sexual Activity     Alcohol use: No     Alcohol/week: 0.0 standard drinks     Drug use: No     Sexual activity: Yes     Partners: Female   Lifestyle     Physical activity     Days per week: Not on file     Minutes per session: Not on file     Stress: Not on file   Relationships     Social connections     Talks on phone: Not on file     Gets together: Not on file     Attends Taoism service: Not on file     Active member of club or organization: Not on file      Attends meetings of clubs or organizations: Not on file     Relationship status: Not on file     Intimate partner violence     Fear of current or ex partner: Not on file     Emotionally abused: Not on file     Physically abused: Not on file     Forced sexual activity: Not on file   Other Topics Concern      Service No     Blood Transfusions No     Caffeine Concern Not Asked     Occupational Exposure Not Asked     Hobby Hazards Not Asked     Sleep Concern Not Asked     Stress Concern Not Asked     Weight Concern Not Asked     Special Diet Not Asked     Back Care Not Asked     Exercise Yes     Bike Helmet Not Asked     Seat Belt Yes     Self-Exams No     Parent/sibling w/ CABG, MI or angioplasty before 65F 55M? Yes   Social History Narrative    Balanced Diet - Yes    Osteoporosis Preventative measures-  Dairy servings per day: 2 to 3 servings daily    Regular Exercise -  Yes Describe walks 1.5 mile daily     Dental Exam up - YES - Date: 2 years ago    Eye Exam - YES - Date: 1 year ago    Self Testicular Exam -  No, handout given    Do you have any concerns about STD's -  No    Abuse: Current or Past (Physical, Sexual or Emotional)- No    Do you feel safe in your environment - Yes    Guns stored in the home - Yes, locked    Sunscreen used - No    Seatbelts used - Yes    Lipids - YES - Date: 3-09    Glucose -  YES - Date: 3-09    Colon Cancer Screening - Colonoscopy 3-08(date completed)    Hemoccults - UNKNOWN    PSA - YES - Date: 11-07    Digital Rectal Exam - UNKNOWN    Immunizations reviewed and up to date - Yes, td 1-2005, had shingles vaccine    5-28-09  JANEY Patel CMA                     PHYSICAL EXAM:    /58   Pulse (!) 37   Temp 96.3  F (35.7  C) (Axillary)   Resp 16   Wt 70.1 kg (154 lb 8 oz)   SpO2 98%   BMI 25.13 kg/m      GENERAL: awake, alert, NAD  HEENT:  normocephalic, no gross abnormalities; MMM, edentulous; pupils equal, EOMI, no scleral icterus or conj edema; [place] - uses ASL  CV:  jt, irreg c 2/6 m; no significant ble edema  RESP: wheezes B c good efforts; no crackles  GI: abd s/nt/nd; no masses  MUSCULOSKELETAL: extremities nl - no gross deformities noted  SKIN: no suspicious lesions or rashes, dry to touch; + bruising on arms  NEURO:  strength normal and symmetric  PSYCH: mood good, affect appropriate  LYMPH: no palpable ant/post cervical and supraclavicular adenopathy    LABS:      CBC RESULTS:     Recent Labs   Lab 05/12/20  0709 05/11/20  1311 05/11/20  1053   WBC 4.3 5.8  --    RBC 5.52 5.78  --    HGB 11.1* 12.0* 12.4*   HCT 35.2* 37.1*  --    * 120*  --      BMP RESULTS:  Recent Labs   Lab 05/12/20  0709 05/11/20  2330 05/11/20  1806 05/11/20  1311 05/11/20  1053   *  --  126* 128* 130*   POTASSIUM 5.5* 5.5* 6.0* 6.8* 6.6*   CHLORIDE 94  --  97 98 98   CO2 27  --  24 26 27   BUN 52*  --  54* 54* 52*   CR 1.91*  --  1.79* 1.86* 1.92*   GLC 95  --  136* 86 100   AYALA 9.1  --  9.5 9.8 10.5     INRNo lab results found in last 7 days.     DIAGNOSTICS:  Personally reviewed - chest/abd/pelv ct (2/20) - cystic kidneys B (also seen on CT 2008, but may be more now)    A/P:  Shiraz Ingram is a 85 year old male c RD/CKD c hyperkalemia and hyponatremia.     1.  RD/CKD III.  Pt's baseline cr is between 1.4-1.7 and it's currently a bit higher.  He is urinating ok and seems euvolemic right now.  No uremic sx.  He is on IVF right now.  The CKD is long-standing and is prob on the basis of HTN.  Of note, he had large cysts on his kidneys in 2008 that looked simple in nature.  The CT done this admission shows similarly-sized cysts, but they don't all look simple.  At this point, we will focus on the electrolyte issues.  A.  Follow labs, uo, sag.  B.  Check BMP today.    2.  Hyperkalemia.  The pt's k is better controlled, though it is still a bit high.  He has been medically managed since admission when his k was 6.8.  This is prob multifactorial in etiology (meds + diet) given the hx.  No  "obv bleeding, though RN noted black stools today.  A.  Follow k.  B.  Add low k parameters to the diet.    3.  Hyponatremia.  The pt's Na has steadily dropped since the last admission.  He has known pulm nodules and renal \"masses\" (CT 2/20) so could have a reason for an ADH stimulus.  He doesn't seem that dry today, but was put on IVF given the low Na today.  He doesn't drink much fluid already, so will not change the amt of intake, yet.  He is on a low Na diet.  A.  Check S/UOsm, Lucia, UA.  B.  If nl, then will check TSH in AM.    4.  Anemia c low MCV.  The pt has a long hx of a low MCV (60s) and hb is usually in the 11-12 range.  He saw Dr. Morris (H/O) in Feb.  A.  Check fe studies.  B.  Follow hb, clinically given the black stools noted by RN.    5.  FEN.  Low salt diet.  A.  Add low k restrictions.  B.  Check labs daily.    Thank you for this consultation. We will follow c you.  Please call if any questions.    Attestation:   I have reviewed today's relevant vital signs, notes, medications, labs and imaging.    Stewart Moncada MD  [facility] - Nephrology  [phone]  "

## 2020-05-12 NOTE — CONSULTS
[facility]    Cardiology Consultation     Date of Admission:  5/11/2020    Assessment & Plan   Shiraz Ingram is a 85 year old male who was admitted on 5/11/2020.    The patient has asymptomatic atrial fibrillation with slow ventricular response.  At this point I would just recommend stopping metoprolol.  His EF is normal and he does not have angina and does not have rapid atrial fibrillation.  As such he does not have a strong indication of being on metoprolol.  However he is anticipating a transcatheter aortic valve implantation.  I briefly discussed his case with Dr. Samuel with the TAVR team.  Given his underlying conduction abnormality and ECG he is a very high risk of complete heart block with transcatheter aortic valve implant.  As such it may be reasonable to proceed with empiric preprocedural pacemaker implant.  We will get an opinion from our electrophysiology team to see if we need to continue to proceed in that direction or just be conservative with stopping metoprolol and evaluate around the time of TAVR.    Remainder of the management per primary team. His K is still up. This is concerning and life threatening. Nephrology has been consulted and kindly looking into hyperkalemia.     I have placed an EP consult for tomorrow. Please call general cardiology with questions.     Gustavo Tierney MD    Primary Care Physician   Dany Rodriguez MD    Reason for Consult   Reason for consult: I was asked by medicine attending to evaluate this patient for atrial fibrillation with slow ventricular response with a heart rate of 37.    History of Present Illness   Shiraz Ingram is a 85 year old male who presents as an admission from clinic for hyperkalemia. Shiraz Ingram is a very pleasant 85-year-old male with a complex past medical history which includes coronary artery disease with history of remote CABG in the early 2000s, hypertension, hyperlipidemia, atrial fibrillation on chronic  anticoagulation, severe aortic stenosis with upcoming plans for TAVR next month, moderate persistent asthma and not O2 dependent at baseline, and stage III CKD with a baseline creatinine of 1.4, among other medical problems, who presented from clinic due to hyperkalemia. This is thought to be likely from lisinopril in the setting of RD from diuresis. Renal has been consulted. He has permament atrial fibrillation, normal EF, and no angina. He is on 25 mg BID of metoprolol at home.  The patient is extremely hard of hearing and requires the use of a  or writing in order to communicate.      The patient was recently hospitalized at [facility] from 4/23/20 - 4/28/20 for issues related to acute hypoxic respiratory failure secondary to an exacerbation of heart failure in the setting of his known severe aortic stenosis. The patient followed up with [facility]iology yesterday as was scheduled with basic labs.  Labs were notable for a creatinine of 1.9 with a BUN of 52.  His potassium was elevated at 6.6 and his sodium was 130.  There was no evidence of hemolyzation so he was referred to the emergency room for further evaluation and then admitted. K has improved now. Renal has been consulted.    Now he has been noted to have heart rates of 37 at times while awake in the setting of atrial fibrillation on telemetry.  Average heart rate is running between 45-50.  He is completely asymptomatic and remains in atrial fibrillation without significant pauses.  Given these findings cardiology has been consulted for asymptomatic bradycardia.  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    Patient Active Problem List   Diagnosis     Coronary atherosclerosis     Benign neoplasm of colon     Localized osteoarthrosis, lower leg     Allergic rhinitis     Moderate persistent asthma     Anemia     Osteoporosis     Hypertension goal BP (blood pressure) < 140/90      Hyperlipidemia with target LDL less than 100     CKD (chronic kidney disease) stage 3, GFR 30-59 ml/min (H)     Nonrheumatic aortic valve stenosis     Advanced directives, counseling/discussion     Iron deficiency anemia     Vitamin B 12 deficiency     Health Care Home     Mild persistent asthma without complication     Acute respiratory failure (H)     Acute on chronic congestive heart failure, unspecified heart failure type (H)     Severe aortic stenosis     Coronary artery disease involving native coronary artery of native heart without angina pectoris     S/P CABG (coronary artery bypass graft)     Hyperkalemia       Past Medical History   I have reviewed this patient's medical history and updated it with pertinent information if needed.   Past Medical History:   Diagnosis Date     Allergic rhinitis, cause unspecified      Anemia, unspecified      Aortic stenosis, mild      Benign neoplasm of colon 1999    Ademonatous polyp     CKD (chronic kidney disease) stage 3, GFR 30-59 ml/min (H) 5/12/2011     Coronary atherosclerosis of unspecified type of vessel, native or graft     s/p CABG 2002     Hyperlipidemia LDL goal < 100      Hypertension goal BP (blood pressure) < 140/90      Localized osteoarthrosis not specified whether primary or secondary, lower leg     left knee     Moderate persistent asthma      Unspecified hearing loss        Past Surgical History   I have reviewed this patient's surgical history and updated it with pertinent information if needed.  Past Surgical History:   Procedure Laterality Date     C NONSPECIFIC PROCEDURE  5/02    Coronary artery bypass     CARDIAC SURGERY       CV ANGIOGRAM CORONARY GRAFT N/A 4/24/2020    Procedure: Angiogram Coronary Graft;  Surgeon: Addison Willard MD;  Location: [facility] CARDIAC CATH LAB     CV CORONARY ANGIOGRAM N/A 4/24/2020    Procedure: Coronary Angiogram;  Surgeon: Addison Willard MD;  Location: [facility] CARDIAC CATH LAB     CV RIGHT HEART  CATH N/A 4/24/2020    Procedure: Right Heart Cath;  Surgeon: Addison Willard MD;  Location:  HEART CARDIAC CATH LAB     HC COLONOSCOPY THRU STOMA W BIOPSY/CAUTERY TUMOR/POLYP/LESION  5/03    Dr. Dow, Q 5 years     HC COLONOSCOPY THRU STOMA W BIOPSY/CAUTERY TUMOR/POLYP/LESION  12/199    Dr. Dow, one adenomatous polyp     HC ESOPHAGOSCOPY, DIAGNOSTIC  5/03    Dr. Dow, lower esophageal ring & mild gastritis     HERNIORRHAPHY INGUINAL Right 9/26/2016    Procedure: HERNIORRHAPHY INGUINAL;  Surgeon: Alexis Alexandra MD;  [facility]     LAPAROSCOPIC CHOLECYSTECTOMY  12/03    for biliary sludge       Prior to Admission Medications   Prior to Admission Medications   Prescriptions Last Dose Informant Patient Reported? Taking?   Multiple Vitamins-Minerals (MULTIVITAMIN PO) 5/11/2020 at Unknown time Son Yes Yes   Sig: Take 1 tablet by mouth daily   albuterol (PROAIR HFA) 108 (90 Base) MCG/ACT inhaler  Son No Yes   Sig: INHALE 1 TO 2 PUFFS EVERY 4 TO 6 HOURS AS NEEDED   aspirin (ASA) 81 MG EC tablet 5/11/2020 at Unknown time Son No Yes   Sig: Take 1 tablet (81 mg) by mouth daily   budesonide (PULMICORT) 0.5 MG/2ML nebulizer solution 5/11/2020 at x1 Son No Yes   Sig: Take 2 mLs (0.5 mg) by nebulization 2 times daily   calcium carbonate 600 mg-vitamin D 400 units (CALTRATE) 600-400 MG-UNIT per tablet 5/11/2020 at Unknown time Son Yes Yes   Sig: Take 1 tablet by mouth daily   dabigatran ANTICOAGULANT (PRADAXA) 150 MG capsule 5/11/2020 at x1 Son No Yes   Sig: Take 1 capsule (150 mg) by mouth 2 times daily Store in original 's bottle or blister pack; use within 120 days of opening.   ferrous sulfate (IRON) 325 (65 Fe) MG tablet 5/11/2020 at Unknown time Son No Yes   Sig: TAKE 1 TABLET(325 MG) BY MOUTH TWICE DAILY   ipratropium - albuterol 0.5 mg/2.5 mg/3 mL (DUONEB) 0.5-2.5 (3) MG/3ML nebulization 5/11/2020 at am Son No Yes   Sig: Inhale 1 vial (3 mLs) into the lungs 4 times daily   lisinopril  (ZESTRIL) 20 MG tablet 2020 at Unknown time Son Yes Yes   Sig: Take 0.5 tablets (10 mg) by mouth daily   metoprolol tartrate (LOPRESSOR) 50 MG tablet 2020 at x1 Son No Yes   Sig: TAKE 1/2 TABLET(25 MG) BY MOUTH TWICE DAILY   montelukast (SINGULAIR) 10 MG tablet 2020 at Unknown time Son No Yes   Sig: TAKE 1 TABLET(10 MG) BY MOUTH DAILY   simvastatin (ZOCOR) 40 MG tablet 5/10/2020 at Unknown time Son No Yes   Sig: Take 1 tablet (40 mg) by mouth At Bedtime      Facility-Administered Medications: None     Current Facility-Administered Medications   Medication Dose Route Frequency     albuterol  10 mg Nebulization Once     aspirin  81 mg Oral Daily     budesonide  0.5 mg Nebulization BID     zz extemporaneous template  1 g Intravenous Once     dabigatran ANTICOAGULANT  75 mg Oral BID     dextrose  25 g Intravenous Once     furosemide  40 mg Intravenous Once     insulin regular (HumuLIN R,NovoLIN R) for IV use  0.1 Units/kg Intravenous Once     montelukast  10 mg Oral Daily     simvastatin  40 mg Oral At Bedtime     sodium bicarbonate  50 mEq Intravenous Once     sodium chloride (PF)  3 mL Intracatheter Q8H     sodium polystyrene  30 g Oral Once     Current Facility-Administered Medications   Medication Last Rate     dextrose 10%       - MEDICATION INSTRUCTIONS -       sodium chloride 50 mL/hr at 20 1330     Allergies   Allergies   Allergen Reactions     No Known Drug Allergies        Social History    reports that he has quit smoking. His smoking use included cigarettes. He has never used smokeless tobacco. He reports that he does not drink alcohol or use drugs.    Family History   Family History   Problem Relation Age of Onset     C.A.D. Father      Cancer Mother         breast cancer,  of pneumonia     Cerebrovascular Disease Son      CABG Son      Family History Negative Child      Family History Negative Sister      Heart Disease Brother        Review of Systems   The comprehensive 10 point  Review of Systems is negative other than noted in the HPI or here.     Physical Exam   Vital Signs with Ranges  Temp:  [96.3  F (35.7  C)-97.5  F (36.4  C)] 96.3  F (35.7  C)  Pulse:  [37-55] 37  Heart Rate:  [55-66] 55  Resp:  [16-18] 16  BP: (104-136)/(44-58) 123/58  SpO2:  [97 %-98 %] 98 %  Wt Readings from Last 4 Encounters:   05/12/20 70.1 kg (154 lb 8 oz)   05/08/20 69.3 kg (152 lb 12.8 oz)   05/05/20 69.3 kg (152 lb 12.8 oz)   04/28/20 70.9 kg (156 lb 4.8 oz)     I/O last 3 completed shifts:  In: 530 [P.O.:530]  Out: 2500 [Urine:2500]      Vitals: /58   Pulse (!) 37   Temp 96.3  F (35.7  C) (Axillary)   Resp 16   Wt 70.1 kg (154 lb 8 oz)   SpO2 98%   BMI 25.13 kg/m      Patient is very hard of hearing.  He is in no acute distress.  Breathing is normal abdomen is nontender.  HEENT no icterus pallor.  Psych appropriate affect.  Cardiac sounds reveal a systolic murmur atrial fibrillation and slow ventricular response.  Lungs are clear.    No lab results found in last 7 days.    Invalid input(s): TROPONINIES    Recent Labs   Lab 05/12/20  1501 05/12/20  0709 05/11/20  2330 05/11/20  1806 05/11/20  1311 05/11/20  1053   WBC  --  4.3  --   --  5.8  --    HGB  --  11.1*  --   --  12.0* 12.4*   MCV  --  64*  --   --  64*  --    PLT  --  107*  --   --  120*  --    * 125*  --  126* 128* 130*   POTASSIUM 6.8* 5.5* 5.5* 6.0* 6.8* 6.6*   CHLORIDE 94 94  --  97 98 98   CO2 25 27  --  24 26 27   BUN 47* 52*  --  54* 54* 52*   CR 1.86* 1.91*  --  1.79* 1.86* 1.92*   GFRESTIMATED 32* 31*  --  34* 32* 33*   GFRESTBLACK 37* 36*  --  39* 37* 40*   ANIONGAP 7 4  --  5 4 Not Calculated   AYALA 9.0 9.1  --  9.5 9.8 10.5   * 95  --  136* 86 100     Recent Labs   Lab Test 06/12/19  0818 12/12/18  0840  07/07/15  0856 01/06/15  0823   CHOL 136 129   < > 117 129   HDL 46 52   < > 39* 48   LDL 74 63   < > 63 68   TRIG 82 71   < > 73 65   CHOLHDLRATIO  --   --   --  3.0 2.7    < > = values in this interval not  displayed.     Recent Labs   Lab 05/12/20  0709 05/11/20  1311 05/11/20  1053   WBC 4.3 5.8  --    HGB 11.1* 12.0* 12.4*   HCT 35.2* 37.1*  --    MCV 64* 64*  --    * 120*  --      No results for input(s): PH, PHV, PO2, PO2V, SAT, PCO2, PCO2V, HCO3, HCO3V in the last 168 hours.  No results for input(s): NTBNPI, NTBNP in the last 168 hours.  No results for input(s): DD in the last 168 hours.  No results for input(s): SED, CRP in the last 168 hours.  Recent Labs   Lab 05/12/20  0709 05/11/20  1311   * 120*     No results for input(s): TSH in the last 168 hours.  No results for input(s): COLOR, APPEARANCE, URINEGLC, URINEBILI, URINEKETONE, SG, UBLD, URINEPH, PROTEIN, UROBILINOGEN, NITRITE, LEUKEST, RBCU, WBCU in the last 168 hours.    Imaging:  No results found for this or any previous visit (from the past 48 hour(s)).    Echo:  No results found for this or any previous visit (from the past 4320 hour(s)).

## 2020-05-12 NOTE — PROVIDER NOTIFICATION
MD Notification    Person notified:hosp    Person Name:Dr. Giron    Date/Time:5/11/20  1800    Interaction:page    Purpose of Notification:Do you want a potassium recheck ordered?    Orders Received:Potassium redraw is in. MD does expect potassium to still be elevated. Give scheduled kayexalate.     Comments:

## 2020-05-12 NOTE — PLAN OF CARE
Patient arrived on unit around 1645, VSS on room air, denies pain. A/Ox4, very  [place],  utilized. BG 78, and 136. D10 infusion shut off per order. Kayexalate given, plan for potassium recheck at 0000. Nephrology consult tomorrow ,discharge plan pending.  scheduled for [phone] and [phone] tomorrow.

## 2020-05-12 NOTE — PROGRESS NOTES
Nebulizer given x 1. Patient tolerated well. Patient  On room air. Breath sounds clear bilaterally. RT Following.              Marzena Nicolas, RT

## 2020-05-12 NOTE — PROGRESS NOTES
[facility]    Pt's son Nicko left a VM on CORE line 5/11 PM after clinic close, inquiring about pt's status.    Spoke with Nicko and advised him if he wants real-time status updates on pt condition that hospital nursing station would be best and provided him that number.     Reviewed with Nicko that we will follow peripherally and arrange for CORE appt post discharge--reminder sent to RN board.    Elly Osman RN BSN   2:56 PM 05/12/20

## 2020-05-13 ENCOUNTER — APPOINTMENT (OUTPATIENT)
Dept: INTERPRETER SERVICES | Facility: CLINIC | Age: 85
End: 2020-05-13
Payer: COMMERCIAL

## 2020-05-13 LAB
ANION GAP SERPL CALCULATED.3IONS-SCNC: 7 MMOL/L (ref 3–14)
ANION GAP SERPL CALCULATED.3IONS-SCNC: 9 MMOL/L (ref 3–14)
BUN SERPL-MCNC: 39 MG/DL (ref 7–30)
BUN SERPL-MCNC: 44 MG/DL (ref 7–30)
CALCIUM SERPL-MCNC: 8.6 MG/DL (ref 8.5–10.1)
CALCIUM SERPL-MCNC: 8.6 MG/DL (ref 8.5–10.1)
CHLORIDE SERPL-SCNC: 92 MMOL/L (ref 94–109)
CHLORIDE SERPL-SCNC: 93 MMOL/L (ref 94–109)
CO2 SERPL-SCNC: 27 MMOL/L (ref 20–32)
CO2 SERPL-SCNC: 28 MMOL/L (ref 20–32)
CREAT SERPL-MCNC: 1.66 MG/DL (ref 0.66–1.25)
CREAT SERPL-MCNC: 1.72 MG/DL (ref 0.66–1.25)
ERYTHROCYTE [DISTWIDTH] IN BLOOD BY AUTOMATED COUNT: 15.6 % (ref 10–15)
FERRITIN SERPL-MCNC: 874 NG/ML (ref 26–388)
GFR SERPL CREATININE-BSD FRML MDRD: 35 ML/MIN/{1.73_M2}
GFR SERPL CREATININE-BSD FRML MDRD: 37 ML/MIN/{1.73_M2}
GLUCOSE SERPL-MCNC: 100 MG/DL (ref 70–99)
GLUCOSE SERPL-MCNC: 95 MG/DL (ref 70–99)
HCT VFR BLD AUTO: 31.7 % (ref 40–53)
HGB BLD-MCNC: 10.4 G/DL (ref 13.3–17.7)
IRON SATN MFR SERPL: 29 % (ref 15–46)
IRON SERPL-MCNC: 69 UG/DL (ref 35–180)
MAGNESIUM SERPL-MCNC: 1.8 MG/DL (ref 1.6–2.3)
MCH RBC QN AUTO: 20.6 PG (ref 26.5–33)
MCHC RBC AUTO-ENTMCNC: 32.8 G/DL (ref 31.5–36.5)
MCV RBC AUTO: 63 FL (ref 78–100)
PLATELET # BLD AUTO: 100 10E9/L (ref 150–450)
POTASSIUM SERPL-SCNC: 4.5 MMOL/L (ref 3.4–5.3)
POTASSIUM SERPL-SCNC: 4.8 MMOL/L (ref 3.4–5.3)
RBC # BLD AUTO: 5.05 10E12/L (ref 4.4–5.9)
SODIUM SERPL-SCNC: 127 MMOL/L (ref 133–144)
SODIUM SERPL-SCNC: 128 MMOL/L (ref 133–144)
SODIUM SERPL-SCNC: 128 MMOL/L (ref 133–144)
TIBC SERPL-MCNC: 239 UG/DL (ref 240–430)
WBC # BLD AUTO: 4.7 10E9/L (ref 4–11)

## 2020-05-13 PROCEDURE — 83540 ASSAY OF IRON: CPT | Performed by: INTERNAL MEDICINE

## 2020-05-13 PROCEDURE — 99222 1ST HOSP IP/OBS MODERATE 55: CPT | Performed by: INTERNAL MEDICINE

## 2020-05-13 PROCEDURE — 94640 AIRWAY INHALATION TREATMENT: CPT

## 2020-05-13 PROCEDURE — 85027 COMPLETE CBC AUTOMATED: CPT | Performed by: INTERNAL MEDICINE

## 2020-05-13 PROCEDURE — 36415 COLL VENOUS BLD VENIPUNCTURE: CPT | Performed by: INTERNAL MEDICINE

## 2020-05-13 PROCEDURE — 25000125 ZZHC RX 250: Performed by: INTERNAL MEDICINE

## 2020-05-13 PROCEDURE — 83735 ASSAY OF MAGNESIUM: CPT | Performed by: INTERNAL MEDICINE

## 2020-05-13 PROCEDURE — 80048 BASIC METABOLIC PNL TOTAL CA: CPT | Performed by: INTERNAL MEDICINE

## 2020-05-13 PROCEDURE — 84295 ASSAY OF SERUM SODIUM: CPT | Performed by: INTERNAL MEDICINE

## 2020-05-13 PROCEDURE — 25000132 ZZH RX MED GY IP 250 OP 250 PS 637: Performed by: INTERNAL MEDICINE

## 2020-05-13 PROCEDURE — 40000275 ZZH STATISTIC RCP TIME EA 10 MIN

## 2020-05-13 PROCEDURE — 21000001 ZZH R&B HEART CARE

## 2020-05-13 PROCEDURE — 94640 AIRWAY INHALATION TREATMENT: CPT | Mod: 76

## 2020-05-13 PROCEDURE — 83550 IRON BINDING TEST: CPT | Performed by: INTERNAL MEDICINE

## 2020-05-13 PROCEDURE — 99232 SBSQ HOSP IP/OBS MODERATE 35: CPT | Performed by: INTERNAL MEDICINE

## 2020-05-13 PROCEDURE — 82728 ASSAY OF FERRITIN: CPT | Performed by: INTERNAL MEDICINE

## 2020-05-13 PROCEDURE — 25000132 ZZH RX MED GY IP 250 OP 250 PS 637: Performed by: HOSPITALIST

## 2020-05-13 PROCEDURE — 25800030 ZZH RX IP 258 OP 636: Performed by: INTERNAL MEDICINE

## 2020-05-13 RX ORDER — IPRATROPIUM BROMIDE AND ALBUTEROL SULFATE 2.5; .5 MG/3ML; MG/3ML
3 SOLUTION RESPIRATORY (INHALATION)
Status: DISCONTINUED | OUTPATIENT
Start: 2020-05-13 | End: 2020-05-14 | Stop reason: HOSPADM

## 2020-05-13 RX ADMIN — BUDESONIDE 0.5 MG: 0.5 INHALANT RESPIRATORY (INHALATION) at 10:17

## 2020-05-13 RX ADMIN — DABIGATRAN ETEXILATE MESYLATE 75 MG: 75 CAPSULE ORAL at 09:39

## 2020-05-13 RX ADMIN — SODIUM CHLORIDE, PRESERVATIVE FREE: 5 INJECTION INTRAVENOUS at 14:48

## 2020-05-13 RX ADMIN — DABIGATRAN ETEXILATE MESYLATE 75 MG: 75 CAPSULE ORAL at 21:01

## 2020-05-13 RX ADMIN — ASPIRIN 81 MG: 81 TABLET, DELAYED RELEASE ORAL at 09:39

## 2020-05-13 RX ADMIN — MONTELUKAST 10 MG: 10 TABLET, FILM COATED ORAL at 09:39

## 2020-05-13 RX ADMIN — ALBUTEROL SULFATE 2 PUFF: 90 AEROSOL, METERED RESPIRATORY (INHALATION) at 05:41

## 2020-05-13 RX ADMIN — IPRATROPIUM BROMIDE AND ALBUTEROL SULFATE 3 ML: .5; 3 SOLUTION RESPIRATORY (INHALATION) at 15:27

## 2020-05-13 RX ADMIN — SIMVASTATIN 40 MG: 40 TABLET, FILM COATED ORAL at 21:01

## 2020-05-13 RX ADMIN — BUDESONIDE 0.5 MG: 0.5 INHALANT RESPIRATORY (INHALATION) at 19:08

## 2020-05-13 RX ADMIN — IPRATROPIUM BROMIDE AND ALBUTEROL SULFATE 3 ML: .5; 3 SOLUTION RESPIRATORY (INHALATION) at 19:08

## 2020-05-13 NOTE — PROGRESS NOTES
SPIRITUAL HEALTH SERVICES Progress Note  [facility]    Visit with pt, per request in flowsheet.  Provided with assistance of .  Pt noted that he is Orthodoxy, but declined my offer to contact a .  He stated no specific need, but was appreciative of the fact that chaplains are available every day, should needs arise.  SH team is happy to follow up at any time per request.                                                                                                                                             Mari Alexandra M.A.  Staff   Pager [phone]  Phone [phone]

## 2020-05-13 NOTE — PROGRESS NOTES
85 year-old white male, deaf. Admitted for SOB, found to have severe aortic stenosis, planned for TAVR.  Persistent AF with RBBB. Had occasional bradycardia down to the 30s while at rest, and low dose metoprolol has been held. Current HR 50-60s.  No bradycardia symptoms in the recent past.    Pt is not yet qualified for pacemaker implantation.  Reconsider pacemaker implantation if he has severe bradycardia after TAVR.

## 2020-05-13 NOTE — PROGRESS NOTES
[facility]     Renal Progress Note       SHORTHAND KEY FOR MY NOTES:  c = with, s = without, p = after, a = before, x = except, asx = asymptomatic, tx = transplant or treatment, sx = symptoms or symptomatic, cx = canceled or culture, rxn = reaction, yday = yesterday, nl = normal, abx = antibiotics, fxn = function, dx = diagnosis, dz = disease, m/h = melena/hematochezia, c/d/l/ha = cramping/dizziness/lightheadedness/headache, d/c = discharge or diarrhea/constipation, f/c/n/v = fevers/chills/nausea/vomiting, cp/sob = chest pain/shortness of breath, tbv = total body volume, rxn = reaction, tdc = tunneled dialysis catheter, pta = prior to admission, hd = hemodialysis, pd = peritoneal dialysis, hhd = home hemodialysis         Assessment/Plan:     1.  RD/CKD.  Pt's cr is back to baseline and he's making a decent amt of urine.  No uremic sx.  TBV is ok.  A.  Follow labs, uo, sx.    2.  Hyperkalemia.  Pt's k is nl p med mgmt and changing diet to k-restricted.  A.  Continue same plan.  B.  Check k in AM.    3.  Hyponatremia.  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  He is receiving NS and Na has been stable.  He also rec'd IV furos, so unsure what helped his Na.  He is not on an official fluid restriction at this time, but he has been monitoring his water intake since yday.  A.  Check Na at 1800.  B.  If it's lower, then stop the saline.    4.  Bradycardia.  Pt's lower HR yday was prob due to the high k.  He was also on a beta blocker, which has been stopped.  He remains in afib c a controlled rate.  A.  Follow clinically.    5.  FEN.  Other than as above, electrolytes are ok.  He is on a low salt diet given CHF hx.        Interval History:     Pt feels fine and has no complaints.  No f/c/n/v.  No cp/sob/abd pain.  No confusion or twitching.  Urinating ok s probs.  He is restricting his water intake.  Wants to walk around the floor.            Medications and Allergies:       aspirin   81 mg Oral Daily     budesonide  0.5 mg Nebulization BID     dabigatran ANTICOAGULANT  75 mg Oral BID     ipratropium - albuterol 0.5 mg/2.5 mg/3 mL  3 mL Nebulization 4x daily     montelukast  10 mg Oral Daily     simvastatin  40 mg Oral At Bedtime     sodium chloride (PF)  3 mL Intracatheter Q8H     Allergies   Allergen Reactions     No Known Drug Allergies           Physical Exam:     Vitals were reviewed    Heart Rate: 63, Blood pressure (!) 140/65, pulse 58, temperature 98.7  F (37.1  C), temperature source Oral, resp. rate 20, weight 71.2 kg (157 lb), SpO2 100 %.  Wt Readings from Last 3 Encounters:   05/13/20 71.2 kg (157 lb)   05/08/20 69.3 kg (152 lb 12.8 oz)   05/05/20 69.3 kg (152 lb 12.8 oz)       Intake/Output Summary (Last 24 hours) at 5/13/2020 1615  Last data filed at 5/13/2020 1300  Gross per 24 hour   Intake 1069 ml   Output 1200 ml   Net -131 ml     GENERAL APPEARANCE: pleasant, NAD, alert; seen c   HEENT:  eyes/ears/nose/neck grossly normal  RESP: lungs cta b c good efforts  CV: RRR c 2/6 m, nl S1/S2  ABDOMEN: o/s/nt/nd  EXTREMITIES/SKIN: tr ble edema; + bruising         Data:     CBC RESULTS:     Recent Labs   Lab 05/13/20  0537 05/12/20  0709 05/11/20  1311 05/11/20  1053   WBC 4.7 4.3 5.8  --    RBC 5.05 5.52 5.78  --    HGB 10.4* 11.1* 12.0* 12.4*   HCT 31.7* 35.2* 37.1*  --    * 107* 120*  --      Basic Metabolic Panel:  Recent Labs   Lab 05/13/20  1248 05/13/20  0537 05/12/20  2103 05/12/20  1759 05/12/20  1634 05/12/20  1501 05/12/20  0709  05/11/20  1806 05/11/20  1311   * 128*  --   --   --  126* 125*  --  126* 128*   POTASSIUM 4.8 4.5 4.4 5.3 6.1* 6.8* 5.5*   < > 6.0* 6.8*   CHLORIDE 93* 92*  --   --   --  94 94  --  97 98   CO2 28 27  --   --   --  25 27  --  24 26   BUN 39* 44*  --   --   --  47* 52*  --  54* 54*   CR 1.66* 1.72*  --   --   --  1.86* 1.91*  --  1.79* 1.86*   * 95  --   --   --  101* 95  --  136* 86   AYALA 8.6 8.6  --   --   --  9.0  9.1  --  9.5 9.8    < > = values in this interval not displayed.     INRNo lab results found in last 7 days.   Attestation:   I have reviewed today's relevant vital signs, notes, medications, labs and imaging.    Stewart Moncada MD  [facility] - Nephrology  [phone]

## 2020-05-13 NOTE — CONSULTS
Consult Date:  05/13/2020      REQUESTING PHYSICIAN:  Dr. Tierney.      REASON FOR CONSULTATION:  Bradycardia.      Mr. Ingram is an 85-year-old white male who was admitted for shortness of breath.  He was found to have severe aortic valve stenosis and is planned to have a TAVR procedure.  He has been in persistent atrial fibrillation with right bundle branch block.  When he was first admitted, he was on low dose of metoprolol.  The heart rate was down to 30 beats per minute at night when he was asleep.  Metoprolol has been stopped and he is currently having a resting heart rate around 50-60 beats per minute.  Symptomatically, he denied history of dizziness, syncope or near-syncope.      The patient is deaf and the information is obtained through an .      PAST MEDICAL HISTORY:  Coronary artery disease with CABG in 2002, chronic renal insufficiency, hypertension, asthma and hearing loss.      FAMILY HISTORY:  Noncontributory to atrial fibrillation or bradycardia.      SOCIAL HISTORY:  He is full code.      REVIEW OF SYSTEMS:  Comprehensive review of the systems showed no fever, cough or diarrhea.  He has tested negative for COVID-19.      CURRENT MEDICATIONS:  Pradaxa 75 mg p.o. b.i.d., simvastatin 40 mg p.o. daily.      ALLERGIES:  NONE.      PHYSICAL EXAMINATION:   GENERAL:  He is alert and oriented without acute distress.   VITAL SIGNS:  Blood pressure 123/50, heart rate 56 beats per minute, temperature 97.9 degrees, oxygen saturation 93% on room air.   HEENT:  Eyes and ENT were unremarkable.   SKIN:  There was no skin rash or lymph node enlargement.   NECK:  Jugular vein was not elevated.   LUNGS:  No crackles or wheezing.   CARDIAC:  Rhythm was irregularly irregular.  Heart sounds were normal with a systolic murmur in the aortic valve area.   ABDOMEN:  No hepatomegaly.   EXTREMITIES:  There was no pedal edema.   NEUROLOGIC:  Showed no focal deficit.      ASSESSMENT AND RECOMMENDATIONS:  Mr. Ingram is an  85-year-old white male with severe aortic valve stenosis.  He has persistent atrial fibrillation with right bundle branch block.  He had some asymptomatic bradycardia after the admission when he was on low dose of metoprolol.  I agree for discontinuation of metoprolol, but do not recommend pacemaker for the time being.  However, if he develops severe bradycardia after the TAVR procedure, we will reconsider pacemaker implantation.         LUZ ORTIZ MD             D: 2020   T: 2020   MT: LEANNE      Name:     SRIKANTH OBANDO   MRN:      [id number]        Account:       [id number]   :      1934           Consult Date:  2020      Document: [id number]       cc: Dany Rodriguez MD

## 2020-05-13 NOTE — PROGRESS NOTES
Patient was transferred via cart on tele with [employer]. His belongings were sent with, cell phone, dentures and clothes.

## 2020-05-13 NOTE — PLAN OF CARE
Pt denies pain, VSS, up with a SBA to void, 1 small BM, K+ 4.4, Tele A-fib CVR, alert and oriented

## 2020-05-13 NOTE — CONSULTS
[facility]    Patient currently followed by Amee Rocha in CORE clinic. Epic review to arrange f/u appt post hospital DC:  Future Appointments   Date Time Provider     5/20/2020 10:10 AM Amee Rocha PA-C      Will need labs for this appt, need to determine how this will be done. Will await DC disposition and schedule accordingly.  Erica Sam RN on 5/13/2020 at 2:46 PM

## 2020-05-13 NOTE — PLAN OF CARE
Pt stable over night, no c/o pain and only mild SOB treated with PRN inhaler with good results, LS are clear and decreased in the bases.  Pt was in Afib BBB with HR's between 50-70's most of the night.  Pt slept well and will be seen buy UMPH/EP to eval for possible PPM today.

## 2020-05-13 NOTE — PHARMACY-CONSULT NOTE
Patient is being treated for hyperkalemia. A pharmacy consult was initiated to review the patient's medication list for possible causes of hyperkalemia.  The following medications for this patient may cause or exacerbate hyperkalemia: Lisinopril - PTA medication - this was not restarted on admission

## 2020-05-13 NOTE — PROGRESS NOTES
[facility]    Hospitalist Progress Note    Assessment & Plan   Shiraz Ingram is a 85 year old male who was admitted on 5/11/2020.  He has a complex medical history including CAD, CABG, atrial fibrillation on anticoagulation with Pradaxa, known left atrial appendage clot, severe aortic stenosis upcoming plans for TAVR, stage III CKD, moderate persistent, chronic anemia and thrombocytopenia patient presented with elevated potassium levels from outpatient labs obtained by cardiology team.  Hyperkalemia  --Patient had persistent hyperkalemia since 4/28/2020, part of the problem could be medication adjustments, his lisinopril was decreased to 10 mg and Lasix was stopped, he presented with a potassium of 6.8.  No other medication noted that could cause hyperkalemia.  --Potassium levels improved with management yesterday including Kayexalate  --Nephrology input appreciated, I did repeat his BMP again today morning which was showing a potassium of 4.8, repeated again in 6hours which shows potassium was within normal limits  --His lisinopril is currently stopped  Chronic diastolic heart failure with recent exacerbation  CAD with a history of CABG  Hypertension  Hyperlipidemia  Permanent atrial fibrillation on chronic anticoagulation  Known left atrial appendage clot  Severe aortic stenosis with plan for TAVR on 6/9  Bradycardia  --Patient has multiple cardiac issues, he closely follows up with cardiology, plan for TAVR soon, he is anticoagulated with Pradaxa, continue that recently he said Lasix has been stopped and her lisinopril dose has been reduced to 10 mg, patient is currently having hyperkalemia, he also had a episode of bradycardia in the emergency department when his potassium was 6.8, he had another episode  today with heart rate in 30s, discussed with cardiology  --Appreciate cardiology input, patient was seen by electrophysiology, they are recommending pacemaker only if he continues to be  bradycardic after TAVR.  --Patient was on beta-blockers, his beta-blockers has been stopped since 5/12.  --Continue aspirin, Pradaxa, statin    Hyponatremia  --Patient was hypo-glycemic following his insulin administration, he was started on 10 percentage dextrose for a short while, possibly leading to hyponatremia, hypervolemia could also be associated issue.  --Fluids changed to normal saline at the rate of 50 mL/h since 5/12, his sodium levels still remain 128, will continue fluids  for now, his renal function is slowly improving  Stage III CKD  --Now with hyperkalemia, appreciate nephrology input  Chronic anemia and thrombocytopenia  Profound SNHL hearing loss  --- Communication mostly through writing  History of pulmonary nodules  --His lesions have been stable since 2014  Renal lesions  --These were also noted on a recent CT scan in 02/2020 and described as simple and minimal complex hemorrhagic cysts with no followup needed.   DVT Prophylaxis: Continue Pradaxa  Code Status: Full Code     Disposition: Expected discharge possibly 5/14    Teresa Dove MD  Text Page   (7am to 6pm)    Interval History   Discussed patient care with the patient with help of , patient is feeling better today, we briefly discussed about his sodium and potassium levels, his renal function is slowly improving, discussed with nursing about getting magnesium levels, magnesium level seems to be within normal limits, he was also seen by cardiology yesterday and electrophysiology today.  -Data reviewed today: I reviewed all new labs and imaging results over the last 24 hours.  Physical Exam   Temp: 97.9  F (36.6  C) Temp src: Oral BP: 138/68 Pulse: 58 Heart Rate: 65 Resp: 15 SpO2: 100 % O2 Device: None (Room air)    Vitals:    05/12/20 0550 05/13/20 0542   Weight: 70.1 kg (154 lb 8 oz) 71.2 kg (157 lb)     Vital Signs with Ranges  Temp:  [96.6  F (35.9  C)-97.9  F (36.6  C)] 97.9  F (36.6  C)  Pulse:  [58-69] 58  Heart Rate:   [56-91] 65  Resp:  [14-24] 15  BP: ()/(42-72) 138/68  SpO2:  [93 %-100 %] 100 %  I/O last 3 completed shifts:  In: 520 [P.O.:120; I.V.:400]  Out: 1850 [Urine:1850]    Constitutional: Awake, alert, cooperative, no apparent distress, patient is very hard of hearing  Respiratory: Clear to auscultation bilaterally, no crackles or wheezing  Cardiovascular: Regular rate and rhythm, normal S1 and S2, and no murmur noted  GI: Normal bowel sounds, soft, non-distended, non-tender  Skin/Integumen: No rashes, no cyanosis, trace edema noted near the ankle  Neuro : moving all 4 extremities, no focal deficit noted     Medications     dextrose 10% Stopped (05/12/20 2225)     - MEDICATION INSTRUCTIONS -       sodium chloride 50 mL/hr at 05/13/20 0941       aspirin  81 mg Oral Daily     budesonide  0.5 mg Nebulization BID     dabigatran ANTICOAGULANT  75 mg Oral BID     ipratropium - albuterol 0.5 mg/2.5 mg/3 mL  3 mL Nebulization 4x daily     montelukast  10 mg Oral Daily     simvastatin  40 mg Oral At Bedtime     sodium chloride (PF)  3 mL Intracatheter Q8H       Data   Recent Labs   Lab 05/13/20  1248 05/13/20  0537 05/12/20  2103  05/12/20  1501 05/12/20  0709  05/11/20  1311   WBC  --  4.7  --   --   --  4.3  --  5.8   HGB  --  10.4*  --   --   --  11.1*  --  12.0*   MCV  --  63*  --   --   --  64*  --  64*   PLT  --  100*  --   --   --  107*  --  120*   * 128*  --   --  126* 125*   < > 128*   POTASSIUM 4.8 4.5 4.4   < > 6.8* 5.5*   < > 6.8*   CHLORIDE 93* 92*  --   --  94 94   < > 98   CO2 28 27  --   --  25 27   < > 26   BUN 39* 44*  --   --  47* 52*   < > 54*   CR 1.66* 1.72*  --   --  1.86* 1.91*   < > 1.86*   ANIONGAP 7 9  --   --  7 4   < > 4   AYALA 8.6 8.6  --   --  9.0 9.1   < > 9.8   * 95  --   --  101* 95   < > 86    < > = values in this interval not displayed.     Recent Labs   Lab 05/13/20  1248 05/13/20  0537 05/12/20  2136 05/12/20 2014 05/12/20  1914 05/12/20  1838 05/12/20  1808  05/12/20  1501  05/12/20  0709  05/11/20  1806   * 95  --   --   --   --   --   --  101* 95  --  136*   BGM  --   --  157* 171* 121* 107* 116*   < >  --   --    < >  --     < > = values in this interval not displayed.       Imaging:   No results found for this or any previous visit (from the past 24 hour(s)).

## 2020-05-14 ENCOUNTER — TELEPHONE (OUTPATIENT)
Dept: FAMILY MEDICINE | Facility: CLINIC | Age: 85
End: 2020-05-14

## 2020-05-14 ENCOUNTER — OFFICE VISIT (OUTPATIENT)
Dept: INTERPRETER SERVICES | Facility: CLINIC | Age: 85
End: 2020-05-14
Payer: COMMERCIAL

## 2020-05-14 VITALS
WEIGHT: 155.7 LBS | BODY MASS INDEX: 25.32 KG/M2 | HEART RATE: 66 BPM | TEMPERATURE: 97.5 F | SYSTOLIC BLOOD PRESSURE: 144 MMHG | RESPIRATION RATE: 11 BRPM | DIASTOLIC BLOOD PRESSURE: 62 MMHG | OXYGEN SATURATION: 95 %

## 2020-05-14 DIAGNOSIS — I35.0 NONRHEUMATIC AORTIC VALVE STENOSIS: Primary | ICD-10-CM

## 2020-05-14 DIAGNOSIS — E87.5 HYPERKALEMIA: ICD-10-CM

## 2020-05-14 LAB
ALBUMIN SERPL-MCNC: 2.9 G/DL (ref 3.4–5)
ALP SERPL-CCNC: 47 U/L (ref 40–150)
ALT SERPL W P-5'-P-CCNC: 21 U/L (ref 0–70)
ANION GAP SERPL CALCULATED.3IONS-SCNC: 4 MMOL/L (ref 3–14)
AST SERPL W P-5'-P-CCNC: 19 U/L (ref 0–45)
BASOPHILS # BLD AUTO: 0 10E9/L (ref 0–0.2)
BASOPHILS NFR BLD AUTO: 0.3 %
BILIRUB SERPL-MCNC: 0.5 MG/DL (ref 0.2–1.3)
BUN SERPL-MCNC: 33 MG/DL (ref 7–30)
CALCIUM SERPL-MCNC: 8.9 MG/DL (ref 8.5–10.1)
CHLORIDE SERPL-SCNC: 98 MMOL/L (ref 94–109)
CO2 SERPL-SCNC: 29 MMOL/L (ref 20–32)
CREAT SERPL-MCNC: 1.6 MG/DL (ref 0.66–1.25)
DIFFERENTIAL METHOD BLD: ABNORMAL
EOSINOPHIL # BLD AUTO: 0.1 10E9/L (ref 0–0.7)
EOSINOPHIL NFR BLD AUTO: 3.8 %
ERYTHROCYTE [DISTWIDTH] IN BLOOD BY AUTOMATED COUNT: 15.7 % (ref 10–15)
GFR SERPL CREATININE-BSD FRML MDRD: 39 ML/MIN/{1.73_M2}
GLUCOSE SERPL-MCNC: 87 MG/DL (ref 70–99)
HCT VFR BLD AUTO: 31.3 % (ref 40–53)
HGB BLD-MCNC: 10.1 G/DL (ref 13.3–17.7)
IMM GRANULOCYTES # BLD: 0 10E9/L (ref 0–0.4)
IMM GRANULOCYTES NFR BLD: 0 %
LYMPHOCYTES # BLD AUTO: 0.6 10E9/L (ref 0.8–5.3)
LYMPHOCYTES NFR BLD AUTO: 18.3 %
MCH RBC QN AUTO: 20.5 PG (ref 26.5–33)
MCHC RBC AUTO-ENTMCNC: 32.3 G/DL (ref 31.5–36.5)
MCV RBC AUTO: 64 FL (ref 78–100)
MONOCYTES # BLD AUTO: 0.3 10E9/L (ref 0–1.3)
MONOCYTES NFR BLD AUTO: 7.4 %
NEUTROPHILS # BLD AUTO: 2.4 10E9/L (ref 1.6–8.3)
NEUTROPHILS NFR BLD AUTO: 70.2 %
NRBC # BLD AUTO: 0 10*3/UL
NRBC BLD AUTO-RTO: 0 /100
PLATELET # BLD AUTO: 93 10E9/L (ref 150–450)
POTASSIUM SERPL-SCNC: 4.7 MMOL/L (ref 3.4–5.3)
PROT SERPL-MCNC: 6 G/DL (ref 6.8–8.8)
RBC # BLD AUTO: 4.93 10E12/L (ref 4.4–5.9)
SODIUM SERPL-SCNC: 131 MMOL/L (ref 133–144)
WBC # BLD AUTO: 3.4 10E9/L (ref 4–11)

## 2020-05-14 PROCEDURE — 25000125 ZZHC RX 250: Performed by: INTERNAL MEDICINE

## 2020-05-14 PROCEDURE — T1013 SIGN LANG/ORAL INTERPRETER: HCPCS | Mod: U3

## 2020-05-14 PROCEDURE — 80053 COMPREHEN METABOLIC PANEL: CPT | Performed by: INTERNAL MEDICINE

## 2020-05-14 PROCEDURE — 94640 AIRWAY INHALATION TREATMENT: CPT

## 2020-05-14 PROCEDURE — 85025 COMPLETE CBC W/AUTO DIFF WBC: CPT | Performed by: INTERNAL MEDICINE

## 2020-05-14 PROCEDURE — 25000132 ZZH RX MED GY IP 250 OP 250 PS 637: Performed by: INTERNAL MEDICINE

## 2020-05-14 PROCEDURE — 36415 COLL VENOUS BLD VENIPUNCTURE: CPT | Performed by: INTERNAL MEDICINE

## 2020-05-14 PROCEDURE — 94640 AIRWAY INHALATION TREATMENT: CPT | Mod: 76

## 2020-05-14 PROCEDURE — 99239 HOSP IP/OBS DSCHRG MGMT >30: CPT | Performed by: INTERNAL MEDICINE

## 2020-05-14 PROCEDURE — 40000275 ZZH STATISTIC RCP TIME EA 10 MIN

## 2020-05-14 RX ORDER — DABIGATRAN ETEXILATE 75 MG/1
150 CAPSULE ORAL 2 TIMES DAILY
Status: DISCONTINUED | OUTPATIENT
Start: 2020-05-14 | End: 2020-05-14 | Stop reason: HOSPADM

## 2020-05-14 RX ADMIN — MONTELUKAST 10 MG: 10 TABLET, FILM COATED ORAL at 09:39

## 2020-05-14 RX ADMIN — ASPIRIN 81 MG: 81 TABLET, DELAYED RELEASE ORAL at 09:40

## 2020-05-14 RX ADMIN — IPRATROPIUM BROMIDE AND ALBUTEROL SULFATE 3 ML: .5; 3 SOLUTION RESPIRATORY (INHALATION) at 07:16

## 2020-05-14 RX ADMIN — DABIGATRAN ETEXILATE MESYLATE 150 MG: 75 CAPSULE ORAL at 09:39

## 2020-05-14 RX ADMIN — BUDESONIDE 0.5 MG: 0.5 INHALANT RESPIRATORY (INHALATION) at 07:18

## 2020-05-14 RX ADMIN — IPRATROPIUM BROMIDE AND ALBUTEROL SULFATE 3 ML: .5; 3 SOLUTION RESPIRATORY (INHALATION) at 10:42

## 2020-05-14 NOTE — PROVIDER NOTIFICATION
Paged on-call hospitalist 05/13/20 7:12 PM regarding Na now 127 from 128, per nephrology note stop IV fluids if Na decreases. Orders received: stop NS infusion, recheck Na with AM labs.

## 2020-05-14 NOTE — PLAN OF CARE
Pt is A&O. All assessments were completed with  present. Denies CP. Tele is a fib  CVR with BBB and PVCs. No bradycardia this shift. Pt cleared for discharge by cardiology and nephrology. IV discontinued and tele removed. Pt up independently in room. Reviewed AVS with patient and all questions answered. Spoke with pt's son, Nicko, and reviewed discharge instructions over the phone. Pt confirmed that all belongings present. Pt left unit via w/c with all belongings and several printed copies of AVS for pt's family members. Son to transport home.

## 2020-05-14 NOTE — PLAN OF CARE
A/O,  used. VSS, O2sats 100% on RA. Tele afib SVR/CVR 50s-70s, some in 30s-40s but asymptomatic. Denies pain. SBA, steady. NS 50 ml/hr. Na 128 --> 127. K 4.5 --> 4.8. IMC discontinued, ok to transfer out of CCU when bed available. Plan for possible discharge tomorrow.

## 2020-05-14 NOTE — TELEPHONE ENCOUNTER
Reason for call:  Other   Patient called regarding (reason for call): call back  Additional comments: Patient needs lab work on 5/15/20 following hospital foloow up. D/C 5/14/20. Central Scheduling unable to schedule at this time    Phone number to reach patient:  Other phone number:  Satya Maharaj [phone]    Best Time:  As soon as pssoible    Can we leave a detailed message on this number?  YES    Travel screening: Not Applicable

## 2020-05-14 NOTE — PLAN OF CARE
Neuro: Alert and oriented x 4, communicated in writing  Recent vital signs:VSS  Respiratory:Room air, denies CLEARY   Pain:Denies  Tele:AFib CVR, HR > 100 when pt ambulating to the bathroom  Activity:SBA  Drips/Drains/IVF:  Skin:WNL  GI/:On 2 gm Na, moderate output  Aggression color:green  Plan:Test/Consult:Discharge today?  Labs:  Misc: Bed alarm on for safety, calls for help

## 2020-05-14 NOTE — TELEPHONE ENCOUNTER
5/29/2018 11:29 AM 
 
Patient:  Javier James YOB: 1954 Date of Visit: 5/29/2018 Dear No Recipients: Thank you for referring Mr. Harika Landa to me for evaluation/treatment. Below are the relevant portions of my assessment and plan of care. If you have questions, please do not hesitate to call me. I look forward to following Mr. Whitney Barber along with you. Sincerely, Jaden Lee NP 
 
 Done - placed standing order for BMP on 5/15 and 5/18 per hospital discharge instructions.  Thanks!  Dr. Taylor Juarez MD / [facility]

## 2020-05-14 NOTE — PROGRESS NOTES
Per Yudith in scheduling with FAITH Alva, she is unable to schedule lab visit for 5/15 without MD approval.  Once she gets approval she will call patient's son Nicko with information.   PCP follow up and labs scheduled for 5/18 at 11am.    Perlita Villa RN  Care Coordinator  [facility]  [phone]

## 2020-05-14 NOTE — DISCHARGE SUMMARY
[facility]    Discharge Summary  Hospitalist    Date of Admission:  5/11/2020  Date of Discharge:  5/14/2020  Discharging Provider: Teresa Dove MD    Discharge Diagnoses   Hyperkalemia    History of Present Illness   Please review admission history and physical    Hospital Course   Shiraz Ingram was admitted on 5/11/2020.  The following problems were addressed during his hospitalization:    Active Problems:    Hyperkalemia    Shiraz Ingram is a 85 year old male who was admitted on 5/11/2020.  He has a complex medical history including CAD, CABG, atrial fibrillation on anticoagulation with Pradaxa, known left atrial appendage clot, severe aortic stenosis upcoming plans for TAVR, stage III CKD, moderate persistent, chronic anemia and thrombocytopenia patient presented with elevated potassium levels from outpatient labs obtained by cardiology team.  Hyperkalemia  --Patient had persistent hyperkalemia since 4/28/2020, part of the problem could be medication adjustments, his lisinopril was decreased to 10 mg and Lasix was stopped, he presented with a potassium of 6.8.  No other medication noted that could cause hyperkalemia.  His potassium had first improved following management of acute hyperkalemia including Kayexalate, later on the next day his potassium did go up and his heart rate also was found to be in 40s when that happened.  Nephrology had changed his diet into a potassium restricted diet and treated him again for acute hyperkalemia, as per discussion between patient , family and nephrology the plan was conservative management at this point.  Mostly medical.  His lisinopril is currently stopped and due to his low heart rate her,e he was seen by cardiology and electrophysiology, current plan is that to stop his metoprolol dose ..  He will also be monitored after his TAVR and if he persistently have low heart rate then he will end up getting a pacemaker.  So far electrophysiology team did not  think that the patient qualified for a pacemaker.  His bradycardia was related to his hyperkalemia.  This was discussed with his son over the phone.  On the day of discharge his potassium is stable at 4.8, plan is to obtain labs again tomorrow as well as on Monday.    Chronic diastolic heart failure with recent exacerbation  CAD with a history of CABG  Hypertension  Hyperlipidemia  Permanent atrial fibrillation on chronic anticoagulation  Known left atrial appendage clot  Severe aortic stenosis with plan for TAVR on 6/9  Bradycardia  --Patient has multiple cardiac issues, he closely follows up with cardiology, plan for TAVR soon, he is anticoagulated with Pradaxa, continue that recently he said Lasix has been stopped and her lisinopril dose has been reduced to 10 mg, patient is currently having hyperkalemia, he also had a episode of bradycardia in the emergency department when his potassium was 6.8, he had another episode  today with heart rate in 30s, discussed with cardiologyAppreciate cardiology input, patient was seen by electrophysiology, they are recommending pacemaker only if he continues to be bradycardic after TAVR.  Patient was on beta-blockers, his beta-blockers has been stopped since 5/12.  --Continue aspirin, Pradaxa, statin     Hyponatremia  Patient was hypo-glycemic following his insulin administration, he was started on 10 percentage dextrose for a short while, possibly leading to hyponatremia, hypervolemia could also be associated issue.  --Fluids changed to normal saline at the rate of 50 mL/h since 5/12,  his fluids were stopped on 5/13 and his sodium is remaining stable today  Stage III CKD  --Patient is currently on his baseline creatinine  Chronic anemia and thrombocytopenia  Profound SNHL hearing loss  --- Communication mostly through writing  History of pulmonary nodules  --His lesions have been stable since 2014  Renal lesions  --These were also noted on a recent CT scan in 02/2020 and  described as simple and minimal complex hemorrhagic cysts with no followup needed.       Teresa Dove MD    Significant Results and Procedures       Pending Results     Unresulted Labs Ordered in the Past 30 Days of this Admission     No orders found from 4/11/2020 to 5/12/2020.          Code Status   Full Code       Primary Care Physician   Dany Rodriguez MD    Physical Exam   Temp: 97.5  F (36.4  C) Temp src: Oral BP: (!) 144/62 Pulse: 66 Heart Rate: 66 Resp: 11 SpO2: 95 % O2 Device: None (Room air)    Vitals:    05/12/20 0550 05/13/20 0542 05/14/20 0402   Weight: 70.1 kg (154 lb 8 oz) 71.2 kg (157 lb) 70.6 kg (155 lb 11.2 oz)     Vital Signs with Ranges  Temp:  [97.5  F (36.4  C)-98.7  F (37.1  C)] 97.5  F (36.4  C)  Pulse:  [58-66] 66  Heart Rate:  [60-66] 66  Resp:  [11-30] 11  BP: (122-170)/(62-80) 144/62  SpO2:  [95 %-100 %] 95 %  I/O last 3 completed shifts:  In: 1348.17 [P.O.:700; I.V.:648.17]  Out: 1475 [Urine:1475]    The patient was examined on the day of discharge.    Discharge Disposition   Discharged to home  Condition at discharge: Stable    Consultations This Hospital Stay   NEPHROLOGY IP CONSULT  CARDIOLOGY IP CONSULT  PHARMACY IP CONSULT  ELECTROPHYSIOLOGY IP CONSULT    Time Spent on this Encounter   I, Teresa Dove MD, personally saw the patient today and spent greater than 30 minutes discharging this patient.    Discharge Orders      Reason for your hospital stay    Hyperkalemia     Follow-up and recommended labs and tests     Follow up with primary care provider, Dany Rodriguez MD, within 7 days to evaluate medication change and for hospital follow- up.  The following labs/tests are recommended: BMP on 5/15 and also on 5/18, this is to evaluate his sodium and potassium levels..     Activity    Your activity upon discharge: activity as tolerated     Discharge Instructions    In case of any chest pain or shortness of breath please report to the emergency department  please obtain the labs as mentioned     Diet    Follow this diet upon discharge: Orders Placed This Encounter      Combination Diet 2 gm K Diet; 2 gm NA Diet     Discharge Medications   Current Discharge Medication List      CONTINUE these medications which have NOT CHANGED    Details   albuterol (PROAIR HFA) 108 (90 Base) MCG/ACT inhaler INHALE 1 TO 2 PUFFS EVERY 4 TO 6 HOURS AS NEEDED  Qty: 1 Inhaler, Refills: 11    Associated Diagnoses: Moderate persistent asthma, uncomplicated      aspirin (ASA) 81 MG EC tablet Take 1 tablet (81 mg) by mouth daily  Qty:      Associated Diagnoses: Coronary artery disease involving native coronary artery of native heart without angina pectoris      budesonide (PULMICORT) 0.5 MG/2ML nebulizer solution Take 2 mLs (0.5 mg) by nebulization 2 times daily  Qty: 120 mL, Refills: 0    Associated Diagnoses: Moderate persistent asthma      calcium carbonate 600 mg-vitamin D 400 units (CALTRATE) 600-400 MG-UNIT per tablet Take 1 tablet by mouth daily      dabigatran ANTICOAGULANT (PRADAXA) 150 MG capsule Take 1 capsule (150 mg) by mouth 2 times daily Store in original 's bottle or blister pack; use within 120 days of opening.  Qty: 180 capsule, Refills: 3    Associated Diagnoses: Coronary artery disease involving native coronary artery of native heart without angina pectoris      ferrous sulfate (IRON) 325 (65 Fe) MG tablet TAKE 1 TABLET(325 MG) BY MOUTH TWICE DAILY  Qty: 180 tablet, Refills: 0    Associated Diagnoses: Anemia, unspecified type      ipratropium - albuterol 0.5 mg/2.5 mg/3 mL (DUONEB) 0.5-2.5 (3) MG/3ML nebulization Inhale 1 vial (3 mLs) into the lungs 4 times daily  Qty: 360 mL, Refills: 11    Associated Diagnoses: Moderate persistent asthma, uncomplicated      montelukast (SINGULAIR) 10 MG tablet TAKE 1 TABLET(10 MG) BY MOUTH DAILY  Qty: 90 tablet, Refills: 1    Comments: **Patient requests 90 days supply**  Associated Diagnoses: Moderate persistent asthma       Multiple Vitamins-Minerals (MULTIVITAMIN PO) Take 1 tablet by mouth daily      simvastatin (ZOCOR) 40 MG tablet Take 1 tablet (40 mg) by mouth At Bedtime  Qty: 90 tablet, Refills: 3    Associated Diagnoses: Hyperlipidemia with target LDL less than 100         STOP taking these medications       lisinopril (ZESTRIL) 20 MG tablet Comments:   Reason for Stopping:         metoprolol tartrate (LOPRESSOR) 50 MG tablet Comments:   Reason for Stopping:             Allergies   Allergies   Allergen Reactions     No Known Drug Allergies      Data   Most Recent 3 CBC's:  Recent Labs   Lab Test 05/14/20  0547 05/13/20  0537 05/12/20  0709   WBC 3.4* 4.7 4.3   HGB 10.1* 10.4* 11.1*   MCV 64* 63* 64*   PLT 93* 100* 107*      Most Recent 3 BMP's:  Recent Labs   Lab Test 05/14/20  0547 05/13/20  1816 05/13/20  1248 05/13/20  0537   * 127* 128* 128*   POTASSIUM 4.7  --  4.8 4.5   CHLORIDE 98  --  93* 92*   CO2 29  --  28 27   BUN 33*  --  39* 44*   CR 1.60*  --  1.66* 1.72*   ANIONGAP 4  --  7 9   AYALA 8.9  --  8.6 8.6   GLC 87  --  100* 95     Most Recent 2 LFT's:  Recent Labs   Lab Test 05/14/20  0547 04/23/20  0325   AST 19 27   ALT 21 32   ALKPHOS 47 70   BILITOTAL 0.5 1.0     Most Recent INR's and Anticoagulation Dosing History:  Anticoagulation Dose History     Recent Dosing and Labs Latest Ref Rng & Units 3/16/2012 4/23/2020 4/24/2020    INR 0.86 - 1.14 1.11 2.35(H) 1.24(H)        Most Recent 3 Troponin's:  Recent Labs   Lab Test 04/23/20  1130 04/23/20  0653 04/23/20  0325  03/16/12  0739   TROPI 0.015 0.017 0.018   < >  --    TROPONIN  --   --   --   --  0.00    < > = values in this interval not displayed.     Most Recent Cholesterol Panel:  Recent Labs   Lab Test 06/12/19  0818   CHOL 136   LDL 74   HDL 46   TRIG 82     Most Recent 6 Bacteria Isolates From Any Culture (See EPIC Reports for Culture Details):  Recent Labs   Lab Test 01/29/17  1903 01/29/17  1810 02/19/13 2115 02/19/13 2110 03/16/12  0735   CULT No  growth No growth No growth No growth No growth     Most Recent TSH, T4 and A1c Labs:  Recent Labs   Lab Test 02/21/13  1702   TSH 1.66     Results for orders placed or performed during the hospital encounter of 04/23/20   POC US CHEST B-SCAN    Impression    [facility] Procedure Note      Limited Bedside ED Ultrasound of Thorax:    PROCEDURE: PERFORMED BY: Dr. Shiraz Poe MD  INDICATIONS/SYMPTOM:  shortness of breath  PROBE: High frequency linear probe  BODY LOCATION: Chest  FINDINGS:  Images of both lung hemithoracies taken in 2D in multiple rib spaces        Right side:  Lung sliding artifact  Present     Comet tail artifacts  Present   Left side:  Lung sliding artifact  Present     Comet tail artifacts  Present   Hemothorax: Right side Absent     Left side Absent   Pleural effusion: Right side Present      Left side Present    INTERPRETATION: There is evidence of free fluid consistent with a Bilateral pleural effusion. B-lines present bilaterally, primarily in anterior lung fields.  IMAGE DOCUMENTATION: Images were archived to PACs system.       XR Chest Port 1 View    Narrative    EXAM: CHEST SINGLE VIEW PORTABLE  LOCATION: [facility]  DATE/TIME: 4/23/2020 3:32 AM    INDICATION: Shortness of breath.  COMPARISON: 8/12/2019.    FINDINGS: Mildly increased interstitial opacities in both lungs. Blunting of bilateral costophrenic angles. Possible mild cardiomegaly. Atherosclerotic calcification in the thoracic aorta. Prior median sternotomy. Mild elevation of the left   hemidiaphragm.      Impression    IMPRESSION:   1.  Mildly increased interstitial opacities in the lungs, most likely relating to interstitial pulmonary edema.  2.  Blunting of bilateral costophrenic angles that could relate to tiny bilateral pleural effusions or scarring.    Transesophageal Echocardiogram    Narrative    [id number]  [id number]  [id number]^IP^SANDRA^ALEKSANDR SERRANO           [facility]  [facility]  Echocardiography Laboratory  [address] [place] [address], [place] [address]        Name: SRIKANTH OBANDO  MRN: [id number]  : 1934  Study Date: 2020 08:39 AM  Age: 85 yrs  Gender: Male  Patient Location: [facility]  Reason For Study: Aortic Valve Disorder  Ordering Physician: SANDRA ASTUDILLO  Referring Physician: NATHAN ROME  Performed By: Alen Peres RDCS     BSA: 1.9 m2  Height: 68 in  Weight: 169 lb  HR: 81  BP: 139/71 mmHg  _____________________________________________________________________________  __        Procedure  Complete ALINE Adult. ALINE Probe serial #[id number] was used during the procedure.  The heart rate, respiratory rate and response to care were monitored  throughout the procedure with the assistance of the nurse.  _____________________________________________________________________________  __        Interpretation Summary     Left ventricular systolic function is normal.The visual ejection fraction is  estimated at 55-60%.  The right ventricular systolic function is normal.  Very dense spontaneous echo contrast seen in left atrial eppendage, in some  views it appears early/pre clot in apperance although no clear cut thrombus  noted.  Severe valvular aortic stenosis.  There is mild (1+) aortic regurgitation.  _____________________________________________________________________________  __        ALINE  I determined this patient to be an appropriate candidate for the planned  sedation and procedure and have reassessed the patient immediately prior to  sedation and procedure. Total sedation time: 46 minutes of continuous bedside  1:1 monitoring. Versed (2.5mg) was given intravenously. Fentanyl (50mcg) was  given intravenously. Consent to the procedure was obtained prior to sedation.  Prior to the exam, the oral cavity was checked and no overcrowding was noted.  The transesophageal probe insertion was technically difficult. There were no  complications associated with this procedure.  Resistance met at 18 cm tomeka  despite moderate sedation- anesthesia unable to pass probe initially and  patient was then given deeper sedation by anesthesia and probe successfully  intubated by anesthesia.  During the procedure 3D imaging could not be obtained due to machine  malfunctioining.     Left Ventricle  Left ventricular systolic function is normal. The visual ejection fraction is  estimated at 55-60%.     Right Ventricle  The right ventricular systolic function is normal.     Atria  The left atrium is moderately dilated. Spontaneous contrast in left atrium.  The right atrium is moderately dilated. There is no color Doppler evidence of  an atrial shunt. Spontaneous contrast in left atrial appendage. Very dense  spontaneous echo contrast seen in left atrial eppendage, in some views it  appears early/pre clot in apperance although no clear cut thrombus noted.        Mitral Valve  There is trace mitral regurgitation.     Tricuspid Valve  There is mild (1+) tricuspid regurgitation.     Aortic Valve  There is mild (1+) aortic regurgitation. Severe valvular aortic stenosis. LVOT  diamter 2.1 cm.  Peak aortic valve velcoity 4.6 m/sec, mean gradient 48 mm hg, LAUREEN 0.65 cm2.     Pulmonic Valve  The pulmonic valve is not well visualized.     Vessels  The aortic root is normal size. Mild atherosclerotic plaque(s) in the  descending aorta. Normal pulmonary vein velocity.        Pericardial/Pleural  There is no pericardial effusion.  _____________________________________________________________________________  __  MMode/2D Measurements & Calculations  LVOT diam: 2.1 cm  LVOT area: 3.5 cm2           Doppler Measurements & Calculations  Ao V2 max: 449.8 cm/sec  Ao max P.0 mmHg  Ao V2 mean: 323.9 cm/sec  Ao mean P.1 mmHg  Ao V2 VTI: 123.8 cm  LAUREEN(I,D): 0.72 cm2  LAUREEN(V,D): 0.81 cm2  LV V1 max P.3 mmHg  LV V1 max: 103.9 cm/sec  LV V1 VTI: 25.4 cm  SV(LVOT): 89.2 ml  SI(LVOT): 46.9 ml/m2  AV Vishnu Ratio (DI):  0.23  LAUREEN Index (cm2/m2): 0.38           _____________________________________________________________________________  __           Report approved by: Billie Parr 2020 11:28 AM      Echocardiogram Limited    Narrative    [id number]  [id number]  [id number]  [id number]^JERSON^RANI^NORMA           [facility]  Echocardiography Laboratory  [address] [place] [id number]        Name: SRIKANTH OBANDO  MRN: [id number]  : 1934  Study Date: 2020 01:51 PM  Age: 85 yrs  Gender: Male  Patient Location: [facility]  Reason For Study: Murmur  Ordering Physician: RANI ARTHUR  Referring Physician: NATHAN ROME  Performed By: Jayna Meyer     BSA: 1.8 m2  Height: 68 in  Weight: 157 lb  HR: 70  BP: 126/75 mmHg  _____________________________________________________________________________  __        Procedure  Limited Portable Echo Adult.  _____________________________________________________________________________  __        Interpretation Summary     Left ventricular systolic function is low normal.  The visual ejection fraction is estimated at 50-55%.  There is moderate concentric left ventricular hypertrophy.  Critical aortic stenosis. Vmax 4.6 m/sec with mean gradient of 47 mmHg. DVI of  0.20 and LAUREEN by continuity is 0.51 cm2 by continuity equation.  Moderately decreased right ventricular systolic function  Dilation of the inferior vena cava is present with normal respiratory  variation in diameter.  There is no pericardial effusion.     Findings are similar to echocardiogram dated 2020. Visualized portions of  the aortic root and ascending aorta are smaller on today's study which may be  in setting of interstudy technical variability. If accurate sizing is  warranted consider CT angiogram.  _____________________________________________________________________________  __        Left Ventricle  The left ventricle is normal in size. There is moderate concentric  left  ventricular hypertrophy. The visual ejection fraction is estimated at 50-55%.  Diastolic function not assessed due to atrial fibrillation. Left ventricular  systolic function is low normal. Normal left ventricular wall motion.     Right Ventricle  The right ventricle is normal size. Moderately decreased right ventricular  systolic function.     Atria  The left atrium is severely dilated. The right atrium is severely dilated.     Mitral Valve  There is moderate mitral annular calcification. There is trace mitral  regurgitation.        Tricuspid Valve  The tricuspid valve is normal in structure and function. There is mild (1+)  tricuspid regurgitation. The right ventricular systolic pressure is  approximated at 20.4 mmHg plus the right atrial pressure. Right ventricular  systolic pressure is normal.     Aortic Valve  The aortic valve is normal in structure and function. There is mild (1+)  aortic regurgitation. Critical aortic stenosis. Vmax 4.6 m/sec with mean  gradient of 47 mmHg. DVI of 0.20 and LAUREEN by continuity is 0.51 cm2 by  continuity equation.     Pulmonic Valve  The pulmonic valve is normal in structure and function.     Vessels  The aortic root is normal size. Normal size ascending aorta. Dilation of the  inferior vena cava is present with normal respiratory variation in diameter.  IVC diameter and respiratory changes fall into an intermediate range  suggesting an RA pressure of 8 mmHg.     Pericardium  There is no pericardial effusion.     _____________________________________________________________________________  __  MMode/2D Measurements & Calculations  IVSd: 1.5 cm  LVIDd: 4.8 cm  LVIDs: 3.6 cm  LVPWd: 1.5 cm  FS: 25.9 %  LV mass(C)d: 291.8 grams  LV mass(C)dI: 158.3 grams/m2     asc Aorta Diam: 3.4 cm  LVOT diam: 2.0 cm  LVOT area: 3.1 cm2  RWT: 0.61        Doppler Measurements & Calculations  Ao V2 max: 477.0 cm/sec  Ao max P.0 mmHg  Ao V2 mean: 316.1 cm/sec  Ao mean P.1 mmHg  Ao V2  VTI: 113.6 cm  LAUREEN(I,D): 0.55 cm2  LAUREEN(V,D): 0.52 cm2  AI P1/2t: 597.9 msec  LV V1 max P.5 mmHg  LV V1 max: 78.3 cm/sec  LV V1 VTI: 20.0 cm  SV(LVOT): 62.9 ml  SI(LVOT): 34.1 ml/m2  TR max vishnu: 225.9 cm/sec  TR max P.4 mmHg  AV Vishnu Ratio (DI): 0.16  LAUREEN Index (cm2/m2): 0.30              _____________________________________________________________________________  __        Report approved by: Billie Gil 2020 04:00 PM      Cardiac Catheterization    Narrative    1.  Severe 3-vessel CAD as described below.  2.  Moderate-severe touchdown lesion of the rPDA branch of the   VTA-cSSV-lVO Y-graft.  Otherwise widely patent LIMA-LAD, SVG-OM, and rPL   Y-graft branch.  3.  Moderately elevated right heart pressures, moderately elevated PCWP,   and low-normal cardiac index by Sofi.

## 2020-05-14 NOTE — TELEPHONE ENCOUNTER
Dr Juarez or Leighton Plosnapoleon -  Can you please put in lab orders for patient for Friday 5/15/2020?  Patient was told to have labs rechecked 5/15/2020 and 5/18/2020 when he was discharged from the hospital on 5/14/2020.    Please have TC or MA call ariana Maharaj (number below) when orders are entered.  Patient uses .  Malathi Cohn RN

## 2020-05-15 ENCOUNTER — PATIENT OUTREACH (OUTPATIENT)
Dept: CARE COORDINATION | Facility: CLINIC | Age: 85
End: 2020-05-15

## 2020-05-15 DIAGNOSIS — I35.0 NONRHEUMATIC AORTIC VALVE STENOSIS: ICD-10-CM

## 2020-05-15 DIAGNOSIS — E87.5 HYPERKALEMIA: ICD-10-CM

## 2020-05-15 LAB — INTERPRETATION ECG - MUSE: NORMAL

## 2020-05-15 PROCEDURE — 80048 BASIC METABOLIC PNL TOTAL CA: CPT | Performed by: FAMILY MEDICINE

## 2020-05-15 PROCEDURE — 36415 COLL VENOUS BLD VENIPUNCTURE: CPT | Performed by: FAMILY MEDICINE

## 2020-05-15 NOTE — LETTER
May 18, 2020    Dear Shiraz,                                                                         You were recently referred to the [facility]'s Clinic Care Coordination service.  This is a service offered through your Primary Care Clinic which can help you access resources and services in regard to your health and well-being goals. The clinic Community Health Worker has placed two calls to you to discuss the nature of this service and to offer enrollment.  If you are interested in learning more about Clinic Care Coordination, please call your Primary Care Clinic's Community Health Worker, Mandy, at [phone].                                                    Sincerely,                                                                              CIARAN Galvan                                                                                          Clinic Care Coordination                                                  [facility]

## 2020-05-15 NOTE — PROGRESS NOTES
Clinic Care Coordination Contact  [facility]/Voicemail    Clinical Data: Care Coordinator Outreach  Outreach attempted x 1.  Left message on patient's voicemail with call back information and requested return call.  Plan: Care Coordinator will try to reach patient again in 1-2 business days.

## 2020-05-16 LAB
ANION GAP SERPL CALCULATED.3IONS-SCNC: 8 MMOL/L (ref 3–14)
BUN SERPL-MCNC: 33 MG/DL (ref 7–30)
CALCIUM SERPL-MCNC: 9.8 MG/DL (ref 8.5–10.1)
CHLORIDE SERPL-SCNC: 100 MMOL/L (ref 94–109)
CO2 SERPL-SCNC: 24 MMOL/L (ref 20–32)
CREAT SERPL-MCNC: 1.44 MG/DL (ref 0.66–1.25)
GFR SERPL CREATININE-BSD FRML MDRD: 44 ML/MIN/{1.73_M2}
GLUCOSE SERPL-MCNC: 101 MG/DL (ref 70–99)
POTASSIUM SERPL-SCNC: 5 MMOL/L (ref 3.4–5.3)
SODIUM SERPL-SCNC: 132 MMOL/L (ref 133–144)

## 2020-05-18 ENCOUNTER — VIRTUAL VISIT (OUTPATIENT)
Dept: FAMILY MEDICINE | Facility: CLINIC | Age: 85
End: 2020-05-18
Payer: COMMERCIAL

## 2020-05-18 VITALS — WEIGHT: 155 LBS | HEIGHT: 66 IN | BODY MASS INDEX: 24.91 KG/M2

## 2020-05-18 DIAGNOSIS — E87.5 HYPERKALEMIA: ICD-10-CM

## 2020-05-18 DIAGNOSIS — N18.30 CKD (CHRONIC KIDNEY DISEASE) STAGE 3, GFR 30-59 ML/MIN (H): ICD-10-CM

## 2020-05-18 DIAGNOSIS — E87.5 HYPERKALEMIA: Primary | ICD-10-CM

## 2020-05-18 DIAGNOSIS — I35.0 NONRHEUMATIC AORTIC VALVE STENOSIS: ICD-10-CM

## 2020-05-18 DIAGNOSIS — R00.1 BRADYCARDIA: ICD-10-CM

## 2020-05-18 PROCEDURE — 36415 COLL VENOUS BLD VENIPUNCTURE: CPT | Performed by: FAMILY MEDICINE

## 2020-05-18 PROCEDURE — 99495 TRANSJ CARE MGMT MOD F2F 14D: CPT | Mod: 95 | Performed by: FAMILY MEDICINE

## 2020-05-18 PROCEDURE — 80048 BASIC METABOLIC PNL TOTAL CA: CPT | Performed by: FAMILY MEDICINE

## 2020-05-18 ASSESSMENT — MIFFLIN-ST. JEOR: SCORE: 1322.89

## 2020-05-18 NOTE — PROGRESS NOTES
"Shiraz Ingram is a 85 year old male who is being evaluated via a billable telephone visit.      The patient has been notified of following:     \"This telephone visit will be conducted via a call between you and your physician/provider. We have found that certain health care needs can be provided without the need for a physical exam.  This service lets us provide the care you need with a short phone conversation.  If a prescription is necessary we can send it directly to your pharmacy.  If lab work is needed we can place an order for that and you can then stop by our lab to have the test done at a later time.    Telephone visits are billed at different rates depending on your insurance coverage. During this emergency period, for some insurers they may be billed the same as an in-person visit.  Please reach out to your insurance provider with any questions.    If during the course of the call the physician/provider feels a telephone visit is not appropriate, you will not be charged for this service.\"    Patient has given verbal consent for Telephone visit?  Yes    What phone number would you like to be contacted at? [phone]    How would you like to obtain your AVS? Mail a copy    Subjective     Shiraz Ingram is a 85 year old male who presents via phone visit today for the following health issues:    [facility]    Hospital Follow-up Visit:    Hospital/Nursing Home/IP Rehab Facility: [facility]  Date of Admission: 05/11/2020  Date of Discharge: 05/14/2020  Reason(s) for Admission: Hyperkalemia      Was your hospitalization related to COVID-19? No   Problems taking medications regularly:  None  Medication changes since discharge: metoprolol and some other medications, pt son unsure taken off  Problems adhering to non-medication therapy:  None    Summary of hospitalization:  [facility] discharge summary reviewed  Diagnostic Tests/Treatments reviewed.  Follow up needed: none  Other Healthcare Providers " "Involved in Patient s Care:         None  Update since discharge: improved.  Post Discharge Medication Reconciliation: discharge medications reconciled, continue medications without change.  Plan of care communicated with patient and family              Son Artur is helping us out. Patient is deaf.   Everything worked out well as per son.  They do not have any concerns    Was initially hospitalized at the last month with aortic stenosis.  He was readmitted with hyperkalemia just last week.  He is feeling fine as per son.    Metoprolol and lisinopril both were stopped.    Patient was seen by nephrology in the hospital.  His diet was changed to potassium restricted diet.    EP recommended against pacemaker until after TVAR if needed.     Reviewed and updated as needed this visit by Provider         Review of Systems   Constitutional, HEENT, cardiovascular, pulmonary, gi and gu systems are negative, except as otherwise noted.       Objective   Reported vitals:  Ht 1.664 m (5' 5.5\")   Wt 70.3 kg (155 lb)   BMI 25.40 kg/m     alert and no distress as per son  PSYCH: Alert and oriented times 3 as per son  RESP: No cough, no audible wheezing, able to talk in full sentences  Remainder of exam unable to be completed due to telephone visits      Assessment/Plan:  (E87.5) Hyperkalemia  (primary encounter diagnosis)  Comment:    Plan: Reviewed his hospital notes.  Repeat labs were done and his potassium is back to baseline.  Continue to hold off on lisinopril.    (R00.1) Bradycardia  Comment:   Plan: Treatment as per cardiology.  Continue to hold off metoprolol.    (N18.3) CKD (chronic kidney disease) stage 3, GFR 30-59 ml/min (H)  Comment:    Plan: Kidney function is not at the best.  Can contribute to hyperkalemia.  Was seen by nephrology in the hospital.      Phone call duration:  12 minutes    Dany Rodriguez MD, MD        "

## 2020-05-18 NOTE — RESULT ENCOUNTER NOTE
Has appointment with PCP today for hospital follow up, and to review labs.  Dr. Taylor Juarez MD / [facility]

## 2020-05-18 NOTE — PROGRESS NOTES
Community Health Worker called and left a message for the patient.  If the patient is returning my call, please transfer the patient to [facility] at ext. [phone].   Patient has been mailed a unreachable letter and was provided with CHW contact information if they are interested in accessing Clinic Care Coordination.  Order for Care Management has been closed, no further outreach will be done at this time and patient can be re-referred.

## 2020-05-19 LAB
ANION GAP SERPL CALCULATED.3IONS-SCNC: 9 MMOL/L (ref 3–14)
BUN SERPL-MCNC: 44 MG/DL (ref 7–30)
CALCIUM SERPL-MCNC: 10.1 MG/DL (ref 8.5–10.1)
CHLORIDE SERPL-SCNC: 98 MMOL/L (ref 94–109)
CO2 SERPL-SCNC: 25 MMOL/L (ref 20–32)
CREAT SERPL-MCNC: 1.44 MG/DL (ref 0.66–1.25)
GFR SERPL CREATININE-BSD FRML MDRD: 44 ML/MIN/{1.73_M2}
GLUCOSE SERPL-MCNC: 104 MG/DL (ref 70–99)
POTASSIUM SERPL-SCNC: 5.2 MMOL/L (ref 3.4–5.3)
SODIUM SERPL-SCNC: 132 MMOL/L (ref 133–144)

## 2020-05-21 ENCOUNTER — TELEPHONE (OUTPATIENT)
Dept: CARDIOLOGY | Facility: CLINIC | Age: 85
End: 2020-05-21

## 2020-05-21 ENCOUNTER — HOSPITAL ENCOUNTER (EMERGENCY)
Facility: CLINIC | Age: 85
Discharge: HOME | DRG: 380 | End: 2020-05-21
Attending: EMERGENCY MEDICINE | Admitting: EMERGENCY MEDICINE
Payer: COMMERCIAL

## 2020-05-21 VITALS
HEART RATE: 85 BPM | SYSTOLIC BLOOD PRESSURE: 129 MMHG | OXYGEN SATURATION: 98 % | TEMPERATURE: 98.2 F | DIASTOLIC BLOOD PRESSURE: 77 MMHG | RESPIRATION RATE: 16 BRPM

## 2020-05-21 DIAGNOSIS — R42 VERTIGO: ICD-10-CM

## 2020-05-21 DIAGNOSIS — I35.0 SEVERE AORTIC STENOSIS: Primary | ICD-10-CM

## 2020-05-21 LAB
ANION GAP SERPL CALCULATED.3IONS-SCNC: 7 MMOL/L (ref 3–14)
BASOPHILS # BLD AUTO: 0 10E9/L (ref 0–0.2)
BASOPHILS NFR BLD AUTO: 0.2 %
BUN SERPL-MCNC: 57 MG/DL (ref 7–30)
CALCIUM SERPL-MCNC: 9.1 MG/DL (ref 8.5–10.1)
CHLORIDE SERPL-SCNC: 100 MMOL/L (ref 94–109)
CO2 SERPL-SCNC: 25 MMOL/L (ref 20–32)
CREAT SERPL-MCNC: 1.66 MG/DL (ref 0.66–1.25)
DIFFERENTIAL METHOD BLD: ABNORMAL
EOSINOPHIL # BLD AUTO: 0.1 10E9/L (ref 0–0.7)
EOSINOPHIL NFR BLD AUTO: 1.6 %
ERYTHROCYTE [DISTWIDTH] IN BLOOD BY AUTOMATED COUNT: 17.7 % (ref 10–15)
GFR SERPL CREATININE-BSD FRML MDRD: 37 ML/MIN/{1.73_M2}
GLUCOSE SERPL-MCNC: 116 MG/DL (ref 70–99)
HCT VFR BLD AUTO: 26.3 % (ref 40–53)
HGB BLD-MCNC: 8.4 G/DL (ref 13.3–17.7)
IMM GRANULOCYTES # BLD: 0 10E9/L (ref 0–0.4)
IMM GRANULOCYTES NFR BLD: 0.4 %
LYMPHOCYTES # BLD AUTO: 0.6 10E9/L (ref 0.8–5.3)
LYMPHOCYTES NFR BLD AUTO: 14.3 %
MCH RBC QN AUTO: 20.7 PG (ref 26.5–33)
MCHC RBC AUTO-ENTMCNC: 31.9 G/DL (ref 31.5–36.5)
MCV RBC AUTO: 65 FL (ref 78–100)
MONOCYTES # BLD AUTO: 0.5 10E9/L (ref 0–1.3)
MONOCYTES NFR BLD AUTO: 12.1 %
NEUTROPHILS # BLD AUTO: 3.2 10E9/L (ref 1.6–8.3)
NEUTROPHILS NFR BLD AUTO: 71.4 %
NRBC # BLD AUTO: 0.1 10*3/UL
NRBC BLD AUTO-RTO: 1 /100
PLATELET # BLD AUTO: 189 10E9/L (ref 150–450)
POTASSIUM SERPL-SCNC: 4.6 MMOL/L (ref 3.4–5.3)
RBC # BLD AUTO: 4.05 10E12/L (ref 4.4–5.9)
SODIUM SERPL-SCNC: 132 MMOL/L (ref 133–144)
TROPONIN I SERPL-MCNC: 0.04 UG/L (ref 0–0.04)
WBC # BLD AUTO: 4.5 10E9/L (ref 4–11)

## 2020-05-21 PROCEDURE — 25000132 ZZH RX MED GY IP 250 OP 250 PS 637: Performed by: EMERGENCY MEDICINE

## 2020-05-21 PROCEDURE — 80048 BASIC METABOLIC PNL TOTAL CA: CPT | Performed by: EMERGENCY MEDICINE

## 2020-05-21 PROCEDURE — 85025 COMPLETE CBC W/AUTO DIFF WBC: CPT | Performed by: EMERGENCY MEDICINE

## 2020-05-21 PROCEDURE — 84484 ASSAY OF TROPONIN QUANT: CPT | Performed by: EMERGENCY MEDICINE

## 2020-05-21 PROCEDURE — 93005 ELECTROCARDIOGRAM TRACING: CPT

## 2020-05-21 PROCEDURE — 99284 EMERGENCY DEPT VISIT MOD MDM: CPT

## 2020-05-21 RX ORDER — MECLIZINE HYDROCHLORIDE 25 MG/1
25 TABLET ORAL ONCE
Status: COMPLETED | OUTPATIENT
Start: 2020-05-21 | End: 2020-05-21

## 2020-05-21 RX ORDER — MECLIZINE HCL 12.5 MG 12.5 MG/1
12.5 TABLET ORAL 4 TIMES DAILY PRN
Qty: 30 TABLET | Refills: 0 | Status: SHIPPED | OUTPATIENT
Start: 2020-05-21 | End: 2020-07-03

## 2020-05-21 RX ADMIN — MECLIZINE HYDROCHLORIDE 25 MG: 25 TABLET ORAL at 20:10

## 2020-05-21 ASSESSMENT — ENCOUNTER SYMPTOMS
DIZZINESS: 1
LIGHT-HEADEDNESS: 0
HEADACHES: 0
FEVER: 0

## 2020-05-21 NOTE — TELEPHONE ENCOUNTER
Spoke with pt's son Willi who reports pt is feeling well after his discharge. Set up pre-op TAVR appts. Requests this information to be sent in the mail as well.     Wellness Screening Tool  Symptom Screening:  Do you have one of the following new symptoms:    Fever or reported chills?  No    A new cough (started within the past 14 days)? No    Shortness of breath (started within the past 14 days)? No    Nausea, vomiting or diarrhea? No  Within the past 3 weeks, have you been exposed to someone with a known positive illness below?    COVID - 19 (known or suspected) No    Chicken pox?  No    Measles? No    Pertussis? No  Patient notified of visitor restriction: Yes, but son accompanies for interpreting.   Patient informed to wear a mask: Yes  Patient's appointment status: Patient will be seen in clinic as scheduled on 6/4/20.    Stephanie Ontiveros RN

## 2020-05-21 NOTE — LETTER
May 21, 2020       TO: Shiraz Ingram   [address]  [facility] [address]       Dear Mr. Ingram,    You are scheduled for the following appointments as part of your pre-operative assessment for TAVR. The lab work on Thursday 6/4/20 at 12:40pm will be completed at [facility] laboratory. Please check-in at the welcome desk in the [employer]. Your appointment at 1:10pm on 6/4/20 will be in the Heart Care clinic, please come up to the 2nd floor after your lab work. You will meet with the nurse practitioner and one of the TAVR coordinators. We will review specific pre-operative instructions with you at this appointment and schedule your post-operative follow-up appointments, 1 week after your TAVR and 1 month after your TAVR.     Future Appointments   Date Time [facility] Department [place]   6/4/2020 12:40 PM  LAB ONLY [facility]   6/4/2020  1:10 PM Daksha Smith APRN CNP [facility]     Your TAVR is scheduled for Tuesday 6/9/20, 1st case. Please arrive at [facility] at 06:00am. You will check in at the welcome desk in the [employer] that day as well. You will need to hold your Pradaxa prior to your TAVR. Your last dose will be: 6/6/20.    Please contact me with any additional questions you may have.    Thank you,  Stephanie GARCIA  Structural Heart Care Coordinator  [phone]

## 2020-05-21 NOTE — ED AVS SNAPSHOT
Emergency Department  [address] [place] [address]  Phone:  [phone]  Fax:  [phone]                                    Shiraz Ingram   MRN: [id number]    Department:   Emergency Department   Date of Visit:  5/21/2020           After Visit Summary Signature Page    I have received my discharge instructions, and my questions have been answered. I have discussed any challenges I see with this plan with the nurse or doctor.    ..........................................................................................................................................  Patient/Patient Representative Signature      ..........................................................................................................................................  Patient Representative Print Name and Relationship to Patient    ..................................................               ................................................  Date                                   Time    ..........................................................................................................................................  Reviewed by Signature/Title    ...................................................              ..............................................  Date                                               Time          22EPIC Rev 08/18

## 2020-05-22 LAB
INTERPRETATION ECG - MUSE: NORMAL
INTERPRETATION ECG - MUSE: NORMAL

## 2020-05-22 NOTE — ED PROVIDER NOTES
History   Chief Complaint:  Dizziness    HPI obtained through written communication as well as      HPI   Shiraz Ingram is an anticoagulated 85 year old male with a history of hypertension, hyperlipidemia, CAD, and CHF who presents with dizziness. On review of the patient's chart, the patient was admitted from 05/11-05/14 for hyperkalemia and hyponatremia. The patient reports that at 1200 this afternoon he developed dizziness which he describes as a room-spinning sensation after bending over. He notes that dizziness was more noticeable when standing and he endorses some difficulty balancing when walking. The dizziness is somewhat improved from onset but not significantly. The patient denies falls, lightheadedness, headache, and fever.    Allergies:  No known drug allergies    Medications:   Albuterol inhaler  Aspirin  Pulmicort   Pradaxa  Duoneb  Singulair  Simvastatin    Past Medical History:    Acute on chronic CHF  Allergic rhinitis   Anemia  Severe aortic stenosis   Benign neoplasm of colon  CAD involving native coronary artery of native heart without angina pectoris  CKD stage 3  Coronary atherosclerosis of unspecified type of vessel, native or graft   Hyperlipidemia    Hypertension   Localized osteoarthrosis not specified whether primary or secondary, lower leg   Moderate persistent asthma   Persistent atrial fibrillation   Hearing loss   Vitamin B 12 deficiency  Nonrheumatic aortic valve stenosis  Osteoporosis    Past Surgical History:    CABG  Angiogram coronary graft  Cardiac surgery  Coronary angiogram  Right heart cath  Herniorrhaphy inguinal, right  Laparoscopic cholecystectomy      Family History:    CAD  Breast cancer  Cerebrovascular disease  CABG  Heart disease    Social History:  Smoking status- former smoker  Alcohol use- no  Drug use- no    Review of Systems   Constitutional: Negative for fever.   Neurological: Positive for dizziness. Negative for light-headedness and headaches.      10 point review of systems performed and is negative except as above and in HPI.      Physical Exam   Patient Vitals for the past 24 hrs:   BP Temp Temp src Pulse Heart Rate Resp SpO2   05/21/20 2126 -- -- -- -- -- -- 98 %   05/21/20 2125 129/77 -- -- 85 -- -- --   05/21/20 2014 -- -- -- -- -- -- 98 %   05/21/20 2013 137/67 -- -- 79 -- -- --   05/21/20 1937 (!) 149/74 98.2  F (36.8  C) Temporal 81 81 16 100 %     Physical Exam  General: Resting on the gurney, appears comfortable  Head:  The scalp, face, and head appear normal  Mouth/Throat: Mucus membranes are moist  CV:  Regular rate    Normal S1 and S2  No pathological murmur   Resp:  Breath sounds clear and equal bilaterally    Non-labored, no retractions or accessory muscle use    No coarseness    No wheezing   GI:  Abdomen is soft, no rigidity    No tenderness to palpation  MS:  Normal motor assessment of all extremities.    Good capillary refill noted.  Skin:  No rash or lesions noted.  Neuro: Speech is normal and fluent. No apparent deficit. Cranial nerves II-XII intact by examination other than hearing loss. Patient ambulates without difficulty with a normal gait.  Psych:  Awake. Alert.  Normal affect.      Appropriate interactions.    Emergency Department Course   ECG (19:38:32):  Rate 88 bpm. VT interval *. QRS duration 148. QT/QTc 402/486. P-R-T axes * -66 64. Atrial fibrillation with premature or aberrantly conducted complexes. RBBB. Left anterior fascicular block. *Bifascicular block.* Interpreted by Trish Tiwari MD.    Laboratory:  Laboratory findings were communicated with the patient who voiced understanding of the findings.    CBC: HGB 8.4 (L) o/w WNL (WBC 4.5, )  BMP: Sodium 132 (L), Glucose 116 (H), Urea Nitrogen 57 (H), Creatinine 1.66 (H), GFR Estimate 37 (L) o/w WNL  Troponin: 0.035    Interventions:  2010 Antivert 25 mg PO     Emergency Department Course:   EKG obtained, results above.    Past medical records, nursing notes,  and vitals reviewed.   1947 I performed an exam of the patient and obtained history, as documented above.     IV inserted and blood drawn.    2151 I rechecked the patient. Explained findings to the patient.     Findings and plan explained to the patient. Patient discharged home with instructions regarding supportive care, medications, and reasons to return. The importance of close follow-up was reviewed.     Impression & Plan    Medical Decision Making:  Shiraz Ingram is a 85 year old male who presents for evaluation of vertigo. The differential diagnosis of vertigo is broad and includes common etiologies such as menieres disease, labyrinthitis, benign positional vertigo, otitis media, etc.  More serious etiologies considered include central etiologies such as tumor, intracerebral bleed, dissection, ischemic cerebral vascular accident.  The history, physical exam including detailed neurologic exam, and workup in the emergency room suggests a benign cause of vertigo today.  Patient feels improved after interventions noted above. Further outpatient management is indicated with vertigo medications.   No indication for advanced imaging at this point (CT/MRI) as there are no definite signs of a central and concerning etiology for the vertigo.  Vertigo precautions given for home.      Diagnosis:    ICD-10-CM   1. Vertigo  R42        Disposition:  Discharged to home.    Discharge Medications:  New Prescriptions    MECLIZINE (ANTIVERT) 12.5 MG TABLET    Take 1 tablet (12.5 mg) by mouth 4 times daily as needed for dizziness         5/21/2020   Shiraz Velasquez I, Shiraz Velasquez, am serving as a scribe at 7:46 PM on 5/21/2020 to document services personally performed by Trish Tiwari MD based on my observations and the provider's statements to me.      Trish Tiwari MD  05/23/20 1541

## 2020-05-22 NOTE — ED TRIAGE NOTES
Pt was admitted 5/11-5/14 for hyperkalemia. Pt reports feeling dizzy at noon today and not getting better.

## 2020-05-23 ENCOUNTER — APPOINTMENT (OUTPATIENT)
Dept: CT IMAGING | Facility: CLINIC | Age: 85
DRG: 380 | End: 2020-05-23
Attending: PHYSICIAN ASSISTANT
Payer: COMMERCIAL

## 2020-05-23 ENCOUNTER — HOSPITAL ENCOUNTER (INPATIENT)
Facility: CLINIC | Age: 85
LOS: 7 days | Discharge: HOME | DRG: 380 | End: 2020-05-30
Attending: PHYSICIAN ASSISTANT | Admitting: HOSPITALIST
Payer: COMMERCIAL

## 2020-05-23 DIAGNOSIS — D50.0 ANEMIA DUE TO GI BLOOD LOSS: ICD-10-CM

## 2020-05-23 DIAGNOSIS — K22.11 ULCER OF ESOPHAGUS WITH BLEEDING: Primary | ICD-10-CM

## 2020-05-23 DIAGNOSIS — K92.2 UPPER GI BLEED: ICD-10-CM

## 2020-05-23 DIAGNOSIS — R79.89 ELEVATED LACTIC ACID LEVEL: ICD-10-CM

## 2020-05-23 LAB
ALBUMIN SERPL-MCNC: 3.3 G/DL (ref 3.4–5)
ALP SERPL-CCNC: 43 U/L (ref 40–150)
ALT SERPL W P-5'-P-CCNC: 23 U/L (ref 0–70)
ANION GAP SERPL CALCULATED.3IONS-SCNC: 8 MMOL/L (ref 3–14)
AST SERPL W P-5'-P-CCNC: 20 U/L (ref 0–45)
BASOPHILS # BLD AUTO: 0 10E9/L (ref 0–0.2)
BASOPHILS NFR BLD AUTO: 0.4 %
BILIRUB SERPL-MCNC: 0.7 MG/DL (ref 0.2–1.3)
BLD PROD TYP BPU: NORMAL
BLD UNIT ID BPU: 0
BLOOD PRODUCT CODE: NORMAL
BPU ID: NORMAL
BUN SERPL-MCNC: 61 MG/DL (ref 7–30)
CALCIUM SERPL-MCNC: 9.3 MG/DL (ref 8.5–10.1)
CHLORIDE SERPL-SCNC: 106 MMOL/L (ref 94–109)
CO2 SERPL-SCNC: 23 MMOL/L (ref 20–32)
CREAT SERPL-MCNC: 1.62 MG/DL (ref 0.66–1.25)
DIFFERENTIAL METHOD BLD: ABNORMAL
EOSINOPHIL # BLD AUTO: 0.1 10E9/L (ref 0–0.7)
EOSINOPHIL NFR BLD AUTO: 1 %
ERYTHROCYTE [DISTWIDTH] IN BLOOD BY AUTOMATED COUNT: 20.1 % (ref 10–15)
GFR SERPL CREATININE-BSD FRML MDRD: 38 ML/MIN/{1.73_M2}
GLUCOSE SERPL-MCNC: 189 MG/DL (ref 70–99)
HCT VFR BLD AUTO: 21.7 % (ref 40–53)
HEMOCCULT STL QL: POSITIVE
HGB BLD-MCNC: 6.7 G/DL (ref 13.3–17.7)
IMM GRANULOCYTES # BLD: 0.1 10E9/L (ref 0–0.4)
IMM GRANULOCYTES NFR BLD: 1 %
INR PPP: 2.18 (ref 0.86–1.14)
LACTATE BLD-SCNC: 6 MMOL/L (ref 0.7–2)
LYMPHOCYTES # BLD AUTO: 1.1 10E9/L (ref 0.8–5.3)
LYMPHOCYTES NFR BLD AUTO: 14.1 %
MCH RBC QN AUTO: 21.1 PG (ref 26.5–33)
MCHC RBC AUTO-ENTMCNC: 30.9 G/DL (ref 31.5–36.5)
MCV RBC AUTO: 68 FL (ref 78–100)
MONOCYTES # BLD AUTO: 0.6 10E9/L (ref 0–1.3)
MONOCYTES NFR BLD AUTO: 7.3 %
NEUTROPHILS # BLD AUTO: 6.1 10E9/L (ref 1.6–8.3)
NEUTROPHILS NFR BLD AUTO: 76.2 %
NRBC # BLD AUTO: 0.4 10*3/UL
NRBC BLD AUTO-RTO: 5 /100
PLATELET # BLD AUTO: 228 10E9/L (ref 150–450)
POTASSIUM SERPL-SCNC: 4.9 MMOL/L (ref 3.4–5.3)
PROT SERPL-MCNC: 6.3 G/DL (ref 6.8–8.8)
RBC # BLD AUTO: 3.18 10E12/L (ref 4.4–5.9)
SODIUM SERPL-SCNC: 137 MMOL/L (ref 133–144)
TRANSFUSION STATUS PATIENT QL: NORMAL
TRANSFUSION STATUS PATIENT QL: NORMAL
WBC # BLD AUTO: 8.1 10E9/L (ref 4–11)

## 2020-05-23 PROCEDURE — P9016 RBC LEUKOCYTES REDUCED: HCPCS | Performed by: PHYSICIAN ASSISTANT

## 2020-05-23 PROCEDURE — 93005 ELECTROCARDIOGRAM TRACING: CPT

## 2020-05-23 PROCEDURE — 85610 PROTHROMBIN TIME: CPT | Performed by: PHYSICIAN ASSISTANT

## 2020-05-23 PROCEDURE — 86923 COMPATIBILITY TEST ELECTRIC: CPT | Performed by: PHYSICIAN ASSISTANT

## 2020-05-23 PROCEDURE — C9113 INJ PANTOPRAZOLE SODIUM, VIA: HCPCS | Performed by: PHYSICIAN ASSISTANT

## 2020-05-23 PROCEDURE — 74176 CT ABD & PELVIS W/O CONTRAST: CPT

## 2020-05-23 PROCEDURE — 25000128 H RX IP 250 OP 636: Performed by: PHYSICIAN ASSISTANT

## 2020-05-23 PROCEDURE — 20000003 ZZH R&B ICU

## 2020-05-23 PROCEDURE — 99292 CRITICAL CARE ADDL 30 MIN: CPT

## 2020-05-23 PROCEDURE — 82272 OCCULT BLD FECES 1-3 TESTS: CPT | Performed by: PHYSICIAN ASSISTANT

## 2020-05-23 PROCEDURE — 25800030 ZZH RX IP 258 OP 636: Performed by: PHYSICIAN ASSISTANT

## 2020-05-23 PROCEDURE — 85025 COMPLETE CBC W/AUTO DIFF WBC: CPT | Performed by: PHYSICIAN ASSISTANT

## 2020-05-23 PROCEDURE — 80053 COMPREHEN METABOLIC PANEL: CPT | Performed by: PHYSICIAN ASSISTANT

## 2020-05-23 PROCEDURE — 99291 CRITICAL CARE FIRST HOUR: CPT | Mod: 25

## 2020-05-23 PROCEDURE — U0003 INFECTIOUS AGENT DETECTION BY NUCLEIC ACID (DNA OR RNA); SEVERE ACUTE RESPIRATORY SYNDROME CORONAVIRUS 2 (SARS-COV-2) (CORONAVIRUS DISEASE [COVID-19]), AMPLIFIED PROBE TECHNIQUE, MAKING USE OF HIGH THROUGHPUT TECHNOLOGIES AS DESCRIBED BY CMS-2020-01-R: HCPCS | Performed by: PHYSICIAN ASSISTANT

## 2020-05-23 PROCEDURE — 99285 EMERGENCY DEPT VISIT HI MDM: CPT | Mod: 25

## 2020-05-23 PROCEDURE — 36430 TRANSFUSION BLD/BLD COMPNT: CPT

## 2020-05-23 PROCEDURE — 86900 BLOOD TYPING SEROLOGIC ABO: CPT | Performed by: PHYSICIAN ASSISTANT

## 2020-05-23 PROCEDURE — 99223 1ST HOSP IP/OBS HIGH 75: CPT | Mod: AI | Performed by: HOSPITALIST

## 2020-05-23 PROCEDURE — 86901 BLOOD TYPING SEROLOGIC RH(D): CPT | Performed by: PHYSICIAN ASSISTANT

## 2020-05-23 PROCEDURE — 83605 ASSAY OF LACTIC ACID: CPT | Performed by: PHYSICIAN ASSISTANT

## 2020-05-23 PROCEDURE — 96365 THER/PROPH/DIAG IV INF INIT: CPT

## 2020-05-23 PROCEDURE — 86850 RBC ANTIBODY SCREEN: CPT | Performed by: PHYSICIAN ASSISTANT

## 2020-05-23 RX ORDER — MULTIVITAMIN,THERAPEUTIC
1 TABLET ORAL 2 TIMES DAILY
COMMUNITY

## 2020-05-23 RX ORDER — HYDROMORPHONE HYDROCHLORIDE 1 MG/ML
0.2 INJECTION, SOLUTION INTRAMUSCULAR; INTRAVENOUS; SUBCUTANEOUS ONCE
Status: DISCONTINUED | OUTPATIENT
Start: 2020-05-23 | End: 2020-05-24

## 2020-05-23 RX ADMIN — SODIUM CHLORIDE 1000 ML: 9 INJECTION, SOLUTION INTRAVENOUS at 21:59

## 2020-05-23 RX ADMIN — SODIUM CHLORIDE 80 MG: 9 INJECTION, SOLUTION INTRAVENOUS at 21:44

## 2020-05-23 ASSESSMENT — ENCOUNTER SYMPTOMS
BLOOD IN STOOL: 1
DIZZINESS: 1
WEAKNESS: 1

## 2020-05-24 DIAGNOSIS — I10 HYPERTENSION GOAL BP (BLOOD PRESSURE) < 140/90: ICD-10-CM

## 2020-05-24 PROBLEM — K92.2 GI BLEED: Status: ACTIVE | Noted: 2020-05-24

## 2020-05-24 LAB
ANION GAP SERPL CALCULATED.3IONS-SCNC: 4 MMOL/L (ref 3–14)
BLD PROD TYP BPU: NORMAL
BLD PROD TYP BPU: NORMAL
BLD UNIT ID BPU: 0
BLD UNIT ID BPU: 0
BLOOD PRODUCT CODE: NORMAL
BLOOD PRODUCT CODE: NORMAL
BPU ID: NORMAL
BPU ID: NORMAL
BUN SERPL-MCNC: 62 MG/DL (ref 7–30)
CALCIUM SERPL-MCNC: 8.5 MG/DL (ref 8.5–10.1)
CHLORIDE SERPL-SCNC: 109 MMOL/L (ref 94–109)
CO2 SERPL-SCNC: 23 MMOL/L (ref 20–32)
CREAT SERPL-MCNC: 1.45 MG/DL (ref 0.66–1.25)
ERYTHROCYTE [DISTWIDTH] IN BLOOD BY AUTOMATED COUNT: 24.1 % (ref 10–15)
GFR SERPL CREATININE-BSD FRML MDRD: 43 ML/MIN/{1.73_M2}
GLUCOSE BLDC GLUCOMTR-MCNC: 109 MG/DL (ref 70–99)
GLUCOSE BLDC GLUCOMTR-MCNC: 134 MG/DL (ref 70–99)
GLUCOSE BLDC GLUCOMTR-MCNC: 134 MG/DL (ref 70–99)
GLUCOSE BLDC GLUCOMTR-MCNC: 93 MG/DL (ref 70–99)
GLUCOSE SERPL-MCNC: 134 MG/DL (ref 70–99)
HCT VFR BLD AUTO: 23.7 % (ref 40–53)
HGB BLD-MCNC: 6.5 G/DL (ref 13.3–17.7)
HGB BLD-MCNC: 6.8 G/DL (ref 13.3–17.7)
HGB BLD-MCNC: 7.2 G/DL (ref 13.3–17.7)
HGB BLD-MCNC: 7.6 G/DL (ref 13.3–17.7)
HGB BLD-MCNC: 7.6 G/DL (ref 13.3–17.7)
HGB BLD-MCNC: 7.9 G/DL (ref 13.3–17.7)
HGB BLD-MCNC: 8.2 G/DL (ref 13.3–17.7)
HGB BLD-MCNC: NORMAL G/DL (ref 13.3–17.7)
HGB BLD-MCNC: NORMAL G/DL (ref 13.3–17.7)
INTERPRETATION ECG - MUSE: NORMAL
LACTATE BLD-SCNC: 1.4 MMOL/L (ref 0.7–2)
LACTATE BLD-SCNC: 1.5 MMOL/L (ref 0.7–2)
MCH RBC QN AUTO: 23.5 PG (ref 26.5–33)
MCHC RBC AUTO-ENTMCNC: 32.1 G/DL (ref 31.5–36.5)
MCV RBC AUTO: 73 FL (ref 78–100)
PLATELET # BLD AUTO: 140 10E9/L (ref 150–450)
POTASSIUM SERPL-SCNC: 4.7 MMOL/L (ref 3.4–5.3)
RBC # BLD AUTO: 3.24 10E12/L (ref 4.4–5.9)
SARS-COV-2 PCR COMMENT: NORMAL
SARS-COV-2 RNA SPEC QL NAA+PROBE: NEGATIVE
SARS-COV-2 RNA SPEC QL NAA+PROBE: NORMAL
SODIUM SERPL-SCNC: 136 MMOL/L (ref 133–144)
SPECIMEN SOURCE: NORMAL
SPECIMEN SOURCE: NORMAL
TRANSFUSION STATUS PATIENT QL: NORMAL
TROPONIN I SERPL-MCNC: 0.37 UG/L (ref 0–0.04)
TROPONIN I SERPL-MCNC: 0.41 UG/L (ref 0–0.04)
TROPONIN I SERPL-MCNC: 0.43 UG/L (ref 0–0.04)
UPPER GI ENDOSCOPY: NORMAL
WBC # BLD AUTO: 4.3 10E9/L (ref 4–11)

## 2020-05-24 PROCEDURE — 84484 ASSAY OF TROPONIN QUANT: CPT | Performed by: HOSPITALIST

## 2020-05-24 PROCEDURE — 25000128 H RX IP 250 OP 636: Performed by: HOSPITALIST

## 2020-05-24 PROCEDURE — 36415 COLL VENOUS BLD VENIPUNCTURE: CPT | Performed by: HOSPITALIST

## 2020-05-24 PROCEDURE — G0500 MOD SEDAT ENDO SERVICE >5YRS: HCPCS | Performed by: INTERNAL MEDICINE

## 2020-05-24 PROCEDURE — 85027 COMPLETE CBC AUTOMATED: CPT | Performed by: HOSPITALIST

## 2020-05-24 PROCEDURE — 43255 EGD CONTROL BLEEDING ANY: CPT | Performed by: INTERNAL MEDICINE

## 2020-05-24 PROCEDURE — 99222 1ST HOSP IP/OBS MODERATE 55: CPT | Performed by: INTERNAL MEDICINE

## 2020-05-24 PROCEDURE — 94640 AIRWAY INHALATION TREATMENT: CPT | Mod: 76

## 2020-05-24 PROCEDURE — 40000275 ZZH STATISTIC RCP TIME EA 10 MIN

## 2020-05-24 PROCEDURE — 25000132 ZZH RX MED GY IP 250 OP 250 PS 637: Performed by: HOSPITALIST

## 2020-05-24 PROCEDURE — 00000146 ZZHCL STATISTIC GLUCOSE BY METER IP

## 2020-05-24 PROCEDURE — 43236 UPPR GI SCOPE W/SUBMUC INJ: CPT | Performed by: INTERNAL MEDICINE

## 2020-05-24 PROCEDURE — 85018 HEMOGLOBIN: CPT | Performed by: HOSPITALIST

## 2020-05-24 PROCEDURE — 99232 SBSQ HOSP IP/OBS MODERATE 35: CPT | Performed by: HOSPITALIST

## 2020-05-24 PROCEDURE — 25000128 H RX IP 250 OP 636: Performed by: INTERNAL MEDICINE

## 2020-05-24 PROCEDURE — 20000003 ZZH R&B ICU

## 2020-05-24 PROCEDURE — 25800030 ZZH RX IP 258 OP 636: Performed by: HOSPITALIST

## 2020-05-24 PROCEDURE — 80048 BASIC METABOLIC PNL TOTAL CA: CPT | Performed by: HOSPITALIST

## 2020-05-24 PROCEDURE — 83605 ASSAY OF LACTIC ACID: CPT | Performed by: HOSPITALIST

## 2020-05-24 PROCEDURE — 99153 MOD SED SAME PHYS/QHP EA: CPT | Performed by: INTERNAL MEDICINE

## 2020-05-24 PROCEDURE — C9113 INJ PANTOPRAZOLE SODIUM, VIA: HCPCS | Performed by: HOSPITALIST

## 2020-05-24 PROCEDURE — 0W3P8ZZ CONTROL BLEEDING IN GASTROINTESTINAL TRACT, VIA NATURAL OR ARTIFICIAL OPENING ENDOSCOPIC: ICD-10-PCS | Performed by: INTERNAL MEDICINE

## 2020-05-24 PROCEDURE — 94640 AIRWAY INHALATION TREATMENT: CPT

## 2020-05-24 PROCEDURE — 25000125 ZZHC RX 250: Performed by: HOSPITALIST

## 2020-05-24 PROCEDURE — 83605 ASSAY OF LACTIC ACID: CPT | Performed by: PHYSICIAN ASSISTANT

## 2020-05-24 PROCEDURE — 25000128 H RX IP 250 OP 636

## 2020-05-24 PROCEDURE — P9016 RBC LEUKOCYTES REDUCED: HCPCS | Performed by: PHYSICIAN ASSISTANT

## 2020-05-24 RX ORDER — LIDOCAINE 40 MG/G
CREAM TOPICAL
Status: DISCONTINUED | OUTPATIENT
Start: 2020-05-24 | End: 2020-05-24 | Stop reason: HOSPADM

## 2020-05-24 RX ORDER — ONDANSETRON 4 MG/1
4 TABLET, ORALLY DISINTEGRATING ORAL EVERY 6 HOURS PRN
Status: DISCONTINUED | OUTPATIENT
Start: 2020-05-24 | End: 2020-05-30 | Stop reason: HOSPADM

## 2020-05-24 RX ORDER — FENTANYL CITRATE 50 UG/ML
INJECTION, SOLUTION INTRAMUSCULAR; INTRAVENOUS
Status: COMPLETED
Start: 2020-05-24 | End: 2020-05-24

## 2020-05-24 RX ORDER — PROCHLORPERAZINE MALEATE 5 MG
5 TABLET ORAL EVERY 6 HOURS PRN
Status: DISCONTINUED | OUTPATIENT
Start: 2020-05-24 | End: 2020-05-30 | Stop reason: HOSPADM

## 2020-05-24 RX ORDER — POTASSIUM CHLORIDE 1500 MG/1
20 TABLET, EXTENDED RELEASE ORAL
Status: DISCONTINUED | OUTPATIENT
Start: 2020-05-24 | End: 2020-05-30 | Stop reason: HOSPADM

## 2020-05-24 RX ORDER — NALOXONE HYDROCHLORIDE 0.4 MG/ML
.1-.4 INJECTION, SOLUTION INTRAMUSCULAR; INTRAVENOUS; SUBCUTANEOUS
Status: DISCONTINUED | OUTPATIENT
Start: 2020-05-24 | End: 2020-05-30 | Stop reason: HOSPADM

## 2020-05-24 RX ORDER — SIMVASTATIN 40 MG
40 TABLET ORAL AT BEDTIME
Status: DISCONTINUED | OUTPATIENT
Start: 2020-05-24 | End: 2020-05-30 | Stop reason: HOSPADM

## 2020-05-24 RX ORDER — NALOXONE HYDROCHLORIDE 0.4 MG/ML
.1-.4 INJECTION, SOLUTION INTRAMUSCULAR; INTRAVENOUS; SUBCUTANEOUS
Status: DISCONTINUED | OUTPATIENT
Start: 2020-05-24 | End: 2020-05-24

## 2020-05-24 RX ORDER — FLUMAZENIL 0.1 MG/ML
0.2 INJECTION, SOLUTION INTRAVENOUS
Status: ACTIVE | OUTPATIENT
Start: 2020-05-24 | End: 2020-05-24

## 2020-05-24 RX ORDER — IPRATROPIUM BROMIDE AND ALBUTEROL SULFATE 2.5; .5 MG/3ML; MG/3ML
3 SOLUTION RESPIRATORY (INHALATION)
Status: DISCONTINUED | OUTPATIENT
Start: 2020-05-24 | End: 2020-05-30 | Stop reason: HOSPADM

## 2020-05-24 RX ORDER — BUDESONIDE 0.5 MG/2ML
0.5 INHALANT ORAL 2 TIMES DAILY
Status: DISCONTINUED | OUTPATIENT
Start: 2020-05-24 | End: 2020-05-30 | Stop reason: HOSPADM

## 2020-05-24 RX ORDER — PROCHLORPERAZINE 25 MG
12.5 SUPPOSITORY, RECTAL RECTAL EVERY 12 HOURS PRN
Status: DISCONTINUED | OUTPATIENT
Start: 2020-05-24 | End: 2020-05-30 | Stop reason: HOSPADM

## 2020-05-24 RX ORDER — EPINEPHRINE IN SOD CHLOR,ISO 1 MG/10 ML
SYRINGE (ML) INTRAVENOUS PRN
Status: DISCONTINUED | OUTPATIENT
Start: 2020-05-24 | End: 2020-05-24 | Stop reason: HOSPADM

## 2020-05-24 RX ORDER — ACETAMINOPHEN 325 MG/1
650 TABLET ORAL EVERY 4 HOURS PRN
Status: DISCONTINUED | OUTPATIENT
Start: 2020-05-24 | End: 2020-05-30 | Stop reason: HOSPADM

## 2020-05-24 RX ORDER — FENTANYL CITRATE 50 UG/ML
100 INJECTION, SOLUTION INTRAMUSCULAR; INTRAVENOUS ONCE
Status: COMPLETED | OUTPATIENT
Start: 2020-05-24 | End: 2020-05-24

## 2020-05-24 RX ORDER — ONDANSETRON 2 MG/ML
4 INJECTION INTRAMUSCULAR; INTRAVENOUS EVERY 6 HOURS PRN
Status: DISCONTINUED | OUTPATIENT
Start: 2020-05-24 | End: 2020-05-30 | Stop reason: HOSPADM

## 2020-05-24 RX ORDER — SODIUM CHLORIDE 9 MG/ML
INJECTION, SOLUTION INTRAVENOUS CONTINUOUS
Status: DISCONTINUED | OUTPATIENT
Start: 2020-05-24 | End: 2020-05-25

## 2020-05-24 RX ORDER — DIPHENHYDRAMINE HCL 25 MG
25 CAPSULE ORAL ONCE
Status: DISCONTINUED | OUTPATIENT
Start: 2020-05-24 | End: 2020-05-30 | Stop reason: HOSPADM

## 2020-05-24 RX ADMIN — IPRATROPIUM BROMIDE AND ALBUTEROL SULFATE 3 ML: .5; 3 SOLUTION RESPIRATORY (INHALATION) at 19:07

## 2020-05-24 RX ADMIN — IPRATROPIUM BROMIDE AND ALBUTEROL SULFATE 3 ML: .5; 3 SOLUTION RESPIRATORY (INHALATION) at 10:45

## 2020-05-24 RX ADMIN — SODIUM CHLORIDE 8 MG/HR: 9 INJECTION, SOLUTION INTRAVENOUS at 22:45

## 2020-05-24 RX ADMIN — SODIUM CHLORIDE: 9 INJECTION, SOLUTION INTRAVENOUS at 14:11

## 2020-05-24 RX ADMIN — BUDESONIDE 0.5 MG: 0.5 INHALANT RESPIRATORY (INHALATION) at 19:07

## 2020-05-24 RX ADMIN — SIMVASTATIN 40 MG: 40 TABLET, FILM COATED ORAL at 21:25

## 2020-05-24 RX ADMIN — FENTANYL CITRATE 75 MCG: 50 INJECTION, SOLUTION INTRAMUSCULAR; INTRAVENOUS at 08:21

## 2020-05-24 RX ADMIN — IPRATROPIUM BROMIDE AND ALBUTEROL SULFATE 3 ML: .5; 3 SOLUTION RESPIRATORY (INHALATION) at 15:29

## 2020-05-24 RX ADMIN — BUDESONIDE 0.5 MG: 0.5 INHALANT RESPIRATORY (INHALATION) at 07:37

## 2020-05-24 RX ADMIN — SODIUM CHLORIDE 8 MG/HR: 9 INJECTION, SOLUTION INTRAVENOUS at 11:36

## 2020-05-24 RX ADMIN — IPRATROPIUM BROMIDE AND ALBUTEROL SULFATE 3 ML: .5; 3 SOLUTION RESPIRATORY (INHALATION) at 07:37

## 2020-05-24 RX ADMIN — FENTANYL CITRATE 75 MCG: 0.05 INJECTION, SOLUTION INTRAMUSCULAR; INTRAVENOUS at 08:21

## 2020-05-24 RX ADMIN — SODIUM CHLORIDE 8 MG/HR: 9 INJECTION, SOLUTION INTRAVENOUS at 03:04

## 2020-05-24 RX ADMIN — MIDAZOLAM HYDROCHLORIDE 3 MG: 1 INJECTION, SOLUTION INTRAMUSCULAR; INTRAVENOUS at 08:22

## 2020-05-24 ASSESSMENT — ACTIVITIES OF DAILY LIVING (ADL)
ADLS_ACUITY_SCORE: 14
ADLS_ACUITY_SCORE: 16
ADLS_ACUITY_SCORE: 14

## 2020-05-24 NOTE — PROVIDER NOTIFICATION
DATE:  5/24/2020   TIME OF RECEIPT FROM LAB:  0155  LAB TEST: Hg  LAB VALUE:  6.8  RESULTS GIVEN WITH READ-BACK TO Primary RN.     Maribel Brooke RN

## 2020-05-24 NOTE — CONSULTS
[facility]    Cardiology Consultation     Date of Admission:  5/23/2020    Assessment & Plan   Shiraz Ingram is a 85 year old male who was admitted on 5/23/2020. I was asked to see the patient for LA clot on anticoagulation in the setting of a GI bleed.    The patient presented to the emergency department with lightheadedness.  He was found to have a hemoglobin of 6.7 down from a previous hemoglobin 2 weeks ago of 10.1.  There is also concerns of dark-colored stools.  He is currently being treated for GI bleeding.  He was anticoagulated with Pradaxa for the left atrial appendage clot.    Currently the risk versus benefits of anticoagulation are in favor of holding anticoagulation.  GI is consulted and if the patient is able to be cleared from a GI standpoint for anticoagulation this should be resumed when possible.    Patient can continue TAVR work-up as an outpatient.    Severe aortic stenosis  Atrial fibrillation with slow ventricular response  LA clot  Coronary artery disease status post CABG in 2002  Anticoagulation  GI bleeding  Anemia secondary to above  HTN  HLD  Moderate asthma  CKD stage III  Hearing loss    Plan:  Hold anticoagulation (Pradaxa) until cleared by gastroenterology    Please restart aspirin when cleared by gastroenterology.    Treatment of GI bleeding per hospitalist and GI team.  Currently the patient is on a Protonix infusion.    Continue simvastatin    I note that the troponin is slightly elevated I doubt this is related to a type I event.  This is likely demand ischemia from anemia.  Recommend to transfuse the patient to a higher goal hemoglobin of 8-9.    Jorge Main MD     Code Status    DNR/DNI    Reason for Consult   Reason for consult: Left atrial appendage clot    Primary Care Physician   Dany Rodriguez MD    Chief Complaint   Lightheadedness    History is obtained from the patient  The patient as well as the medical record.  Communication was via  writing due to the patient's hearing impairment.    History of Present Illness   Shiraz Ingram is a 85 year old male who presents with lightheadedness.  The patient has a known history of coronary artery disease status post CABG, left atrial appendage clot that was discovered during a TAVR work-up, atrial fibrillation chronically anticoagulated on Pradaxa, hypertension, hyperlipidemia, asthma, CKD stage III.    Patient presented with lightheadedness with dark-colored stools.  He was seen in the emergency department and his hemoglobin was 6.7 which was down from a baseline of 11-12 a month previous.  2 units of red blood cells were given.  His Pradaxa was stopped.  Praxbind was not given due to the patient's stability.  The patient was placed in the intensive care unit.    Endoscopy noted a superficial ulcer in the gastroesophageal junction.  This was injected with epinephrine.  Argon plasma was also utilized.  There is concern for a second site of bleeding.    Currently the patient feels well and is without complaints in the ICU.  His blood pressure is soft but he is mentating well.    Past Medical History   I have reviewed this patient's medical history and updated it with pertinent information if needed.   Past Medical History:   Diagnosis Date     Allergic rhinitis, cause unspecified      Anemia, unspecified      Aortic stenosis     severe     Benign neoplasm of colon 1999    Ademonatous polyp     CKD (chronic kidney disease) stage 3, GFR 30-59 ml/min (H) 5/12/2011     Coronary atherosclerosis of unspecified type of vessel, native or graft     s/p CABG 2002     Hyperlipidemia LDL goal < 100      Hypertension goal BP (blood pressure) < 140/90      Localized osteoarthrosis not specified whether primary or secondary, lower leg     left knee     Moderate persistent asthma      Persistent atrial fibrillation      Unspecified hearing loss        Past Surgical History   I have reviewed this patient's surgical history and  updated it with pertinent information if needed.  Past Surgical History:   Procedure Laterality Date     C NONSPECIFIC PROCEDURE  5/02    Coronary artery bypass     CARDIAC SURGERY       CV ANGIOGRAM CORONARY GRAFT N/A 4/24/2020    Procedure: Angiogram Coronary Graft;  Surgeon: Addison Willard MD;  Location:  HEART CARDIAC CATH LAB     CV CORONARY ANGIOGRAM N/A 4/24/2020    Procedure: Coronary Angiogram;  Surgeon: Addison Willard MD;  Location:  HEART CARDIAC CATH LAB     CV RIGHT HEART CATH N/A 4/24/2020    Procedure: Right Heart Cath;  Surgeon: Addison Willard MD;  Location:  HEART CARDIAC CATH LAB     HC COLONOSCOPY THRU STOMA W BIOPSY/CAUTERY TUMOR/POLYP/LESION  5/03    Dr. Dow, Q 5 years     HC COLONOSCOPY THRU STOMA W BIOPSY/CAUTERY TUMOR/POLYP/LESION  12/199    Dr. Dow, one adenomatous polyp     HC ESOPHAGOSCOPY, DIAGNOSTIC  5/03    Dr. Dow, lower esophageal ring & mild gastritis     HERNIORRHAPHY INGUINAL Right 9/26/2016    Procedure: HERNIORRHAPHY INGUINAL;  Surgeon: Alexis Alexandra MD;  Location: [facility]     LAPAROSCOPIC CHOLECYSTECTOMY  12/03    for biliary sludge       Prior to Admission Medications   Prior to Admission Medications   Prescriptions Last Dose Informant Patient Reported? Taking?   albuterol (PROAIR HFA) 108 (90 Base) MCG/ACT inhaler  at prn Son No Yes   Sig: INHALE 1 TO 2 PUFFS EVERY 4 TO 6 HOURS AS NEEDED   aspirin (ASA) 81 MG EC tablet 5/23/2020 at am Son No Yes   Sig: Take 1 tablet (81 mg) by mouth daily   budesonide (PULMICORT) 0.5 MG/2ML nebulizer solution 5/23/2020 at am Son No Yes   Sig: Take 2 mLs (0.5 mg) by nebulization 2 times daily   calcium carbonate 600 mg-vitamin D 400 units (CALTRATE) 600-400 MG-UNIT per tablet 5/23/2020 at am Son Yes Yes   Sig: Take 1 tablet by mouth daily   dabigatran ANTICOAGULANT (PRADAXA) 150 MG capsule 5/23/2020 at am Son No Yes   Sig: Take 1 capsule (150 mg) by mouth 2 times daily Store in original 's  bottle or blister pack; use within 120 days of opening.   ferrous sulfate (IRON) 325 (65 Fe) MG tablet 2020 at am Son No Yes   Sig: TAKE 1 TABLET(325 MG) BY MOUTH TWICE DAILY   ipratropium - albuterol 0.5 mg/2.5 mg/3 mL (DUONEB) 0.5-2.5 (3) MG/3ML nebulization 2020 at am Son No Yes   Sig: Inhale 1 vial (3 mLs) into the lungs 4 times daily   meclizine (ANTIVERT) 12.5 MG tablet  at prn  No Yes   Sig: Take 1 tablet (12.5 mg) by mouth 4 times daily as needed for dizziness   montelukast (SINGULAIR) 10 MG tablet 2020 at am Son No Yes   Sig: TAKE 1 TABLET(10 MG) BY MOUTH DAILY   multivitamin, therapeutic (THERA-VIT) TABS tablet 2020 at am  Yes Yes   Sig: Take 1 tablet by mouth daily   simvastatin (ZOCOR) 40 MG tablet 2020 at pm Son No Yes   Sig: Take 1 tablet (40 mg) by mouth At Bedtime      Facility-Administered Medications: None     Allergies   Allergies   Allergen Reactions     No Known Drug Allergies        Social History   I have reviewed this patient's social history and updated it with pertinent information if needed. Shiraz Ingram  reports that he has quit smoking. His smoking use included cigarettes. He has never used smokeless tobacco. He reports that he does not drink alcohol or use drugs.    Family History   I have reviewed this patient's family history and updated it with pertinent information if needed.   Family History   Problem Relation Age of Onset     C.A.D. Father      Cancer Mother         breast cancer,  of pneumonia     Cerebrovascular Disease Son      CABG Son      Family History Negative Child      Family History Negative Sister      Heart Disease Brother        Review of Systems   Review of systems not obtained due to patient factors - language barrier    Physical Exam   Temp: 98.3  F (36.8  C) Temp src: Oral BP: (!) 86/74 Pulse: 73 Heart Rate: 76 Resp: 23 SpO2: 95 % O2 Device: None (Room air) Oxygen Delivery: 4 LPM  Vital Signs with Ranges  Temp:  [96.8  F (36  C)-99.5   F (37.5  C)] 98.3  F (36.8  C)  Pulse:  [70-95] 73  Heart Rate:  [70-98] 76  Resp:  [10-32] 23  BP: ()/() 86/74  SpO2:  [82 %-100 %] 95 %  149 lbs 4.02 oz    GENERAL APPEARANCE: Alert and oriented x3. No acute distress.  SKIN: Inspection of the skin reveals no rashes, ulcerations, warm, dry  NECK: Supple and symmetric.  normal JVP  LUNGS: Clear breath sounds throughout bilaterally, no wheezes, no rhonchi  CARDIOVASCULAR: S1, S2, regular rate and rhythm with 3/6 systolic murmur RUSB and LUSB   ABDOMEN: Soft, non-tender, non-distended with normal bowel sounds.  No ascites noted.  EXTREMITIES: no edema.  NEUROLOGIC:  Normal mood and affect.  Sensation to touch was normal.      Data   Results for orders placed or performed during the hospital encounter of 05/23/20 (from the past 24 hour(s))   CBC with platelets differential   Result Value Ref Range    WBC 8.1 4.0 - 11.0 10e9/L    RBC Count 3.18 (L) 4.4 - 5.9 10e12/L    Hemoglobin 6.7 (LL) 13.3 - 17.7 g/dL    Hematocrit 21.7 (L) 40.0 - 53.0 %    MCV 68 (L) 78 - 100 fl    MCH 21.1 (L) 26.5 - 33.0 pg    MCHC 30.9 (L) 31.5 - 36.5 g/dL    RDW 20.1 (H) 10.0 - 15.0 %    Platelet Count 228 150 - 450 10e9/L    Diff Method Automated Method     % Neutrophils 76.2 %    % Lymphocytes 14.1 %    % Monocytes 7.3 %    % Eosinophils 1.0 %    % Basophils 0.4 %    % Immature Granulocytes 1.0 %    Nucleated RBCs 5 (H) 0 /100    Absolute Neutrophil 6.1 1.6 - 8.3 10e9/L    Absolute Lymphocytes 1.1 0.8 - 5.3 10e9/L    Absolute Monocytes 0.6 0.0 - 1.3 10e9/L    Absolute Eosinophils 0.1 0.0 - 0.7 10e9/L    Absolute Basophils 0.0 0.0 - 0.2 10e9/L    Abs Immature Granulocytes 0.1 0 - 0.4 10e9/L    Absolute Nucleated RBC 0.4    Comprehensive metabolic panel   Result Value Ref Range    Sodium 137 133 - 144 mmol/L    Potassium 4.9 3.4 - 5.3 mmol/L    Chloride 106 94 - 109 mmol/L    Carbon Dioxide 23 20 - 32 mmol/L    Anion Gap 8 3 - 14 mmol/L    Glucose 189 (H) 70 - 99 mg/dL    Urea  Nitrogen 61 (H) 7 - 30 mg/dL    Creatinine 1.62 (H) 0.66 - 1.25 mg/dL    GFR Estimate 38 (L) >60 mL/min/[1.73_m2]    GFR Estimate If Black 44 (L) >60 mL/min/[1.73_m2]    Calcium 9.3 8.5 - 10.1 mg/dL    Bilirubin Total 0.7 0.2 - 1.3 mg/dL    Albumin 3.3 (L) 3.4 - 5.0 g/dL    Protein Total 6.3 (L) 6.8 - 8.8 g/dL    Alkaline Phosphatase 43 40 - 150 U/L    ALT 23 0 - 70 U/L    AST 20 0 - 45 U/L   ABO/Rh type and screen   Result Value Ref Range    Units Ordered 2     ABO A     RH(D) Pos     Antibody Screen Neg     Test Valid Only At [facility]        Specimen Expires 05/26/2020     Crossmatch Red Blood Cells    INR   Result Value Ref Range    INR 2.18 (H) 0.86 - 1.14   Lactic acid   Result Value Ref Range    Lactic Acid 6.0 (HH) 0.7 - 2.0 mmol/L   Occult blood stool   Result Value Ref Range    Occult Blood Positive (A) NEG^Negative   Blood component   Result Value Ref Range    Unit Number [id number]     Blood Component Type Red Blood Cells Leukocyte Reduced     Division Number 00     Status of Unit Released to care unit     Blood Product Code [id number]     Unit Status ISS    Blood component   Result Value Ref Range    Unit Number [id number]     Blood Component Type Red Blood Cells Leukocyte Reduced     Division Number 00     Status of Unit Released to care unit 05/24/2020 0412     Blood Product Code [id number]     Unit Status ISS    EKG 12-lead, tracing only   Result Value Ref Range    Interpretation ECG Click View Image link to view waveform and result    CT Abdomen Pelvis w/o Contrast    Narrative    EXAM: CT ABDOMEN PELVIS W/O CONTRAST  LOCATION: [facility]  DATE/TIME: 5/23/2020 9:54 PM    INDICATION: Abdominal pain. Rectal bleeding.  COMPARISON: 02/25/2020  TECHNIQUE: CT scan of the abdomen and pelvis was performed without IV contrast. Multiplanar reformats were obtained. Dose reduction techniques were used.  CONTRAST: None.    FINDINGS:   LOWER CHEST: Sternotomy. A few small  scattered calcified granulomata with a few of the nodules uncalcified. Segmental elevation of the left hemidiaphragm.     HEPATOBILIARY: Cholecystectomy.    PANCREAS: Normal.    SPLEEN: Normal.    ADRENAL GLANDS: Normal.    KIDNEYS/BLADDER: There are multiple (greater than 10), bilateral renal cysts present many of the cysts mildly hypodense compatible with complex cysts. These likely contain proteinaceous debris or hemorrhage.    BOWEL: Normal.    LYMPH NODES: Normal.    VASCULATURE: 2.8 cm infrarenal abdominal aortic ectasia.    PELVIC ORGANS: Mild prostatic enlargement.    MUSCULOSKELETAL: Demineralization. Moderate compression of T12 with kyphosis at this level. Mild compression of superior endplate of L3. Stable L1 compression fracture.      Impression    IMPRESSION:   1.  Multiple bilateral renal cysts many of which are complicated. These likely containing proteinaceous debris or blood products and are similar to the prior exam.    2.  Cholecystectomy.     Asymptomatic COVID-19 Virus (Coronavirus) by PCR    Specimen: Nasopharyngeal   Result Value Ref Range    COVID-19 Virus PCR to [facility] - Source Nasopharyngeal     COVID-19 Virus PCR to [facility] - Result       Test received-See reflex to IDDL test SARS CoV2 (COVID-19) Virus RT-PCR   SARS-CoV-2 COVID-19 Virus (Coronavirus) RT-PCR Nasopharyngeal    Specimen: Nasopharyngeal   Result Value Ref Range    SARS-CoV-2 Virus Specimen Source Nasopharyngeal     SARS-CoV-2 PCR Result NEGATIVE     SARS-CoV-2 PCR Comment       This automated, real-time RT-PCR assay by [employer] on the [employer] has   been given Emergency Use Authorization (EUA) for the in vitro qualitative detection of RNA   from the SARS-CoV2 virus in nasopharyngeal swabs in viral transport medium from patients   with signs and symptoms of infection who are suspected of COVID-19. The performance is   unknown in asymptomatic patients.     Lactic acid   Result Value Ref Range    Lactic Acid  1.5 0.7 - 2.0 mmol/L   Glucose by meter   Result Value Ref Range    Glucose 134 (H) 70 - 99 mg/dL   Hemoglobin   Result Value Ref Range    Hemoglobin 6.8 (LL) 13.3 - 17.7 g/dL   Lactic acid whole blood   Result Value Ref Range    Lactic Acid 1.4 0.7 - 2.0 mmol/L   Hemoglobin   Result Value Ref Range    Hemoglobin Canceled, Test credited 13.3 - 17.7 g/dL   Basic metabolic panel   Result Value Ref Range    Sodium 136 133 - 144 mmol/L    Potassium 4.7 3.4 - 5.3 mmol/L    Chloride 109 94 - 109 mmol/L    Carbon Dioxide 23 20 - 32 mmol/L    Anion Gap 4 3 - 14 mmol/L    Glucose 134 (H) 70 - 99 mg/dL    Urea Nitrogen 62 (H) 7 - 30 mg/dL    Creatinine 1.45 (H) 0.66 - 1.25 mg/dL    GFR Estimate 43 (L) >60 mL/min/[1.73_m2]    GFR Estimate If Black 50 (L) >60 mL/min/[1.73_m2]    Calcium 8.5 8.5 - 10.1 mg/dL   Hemoglobin   Result Value Ref Range    Hemoglobin 6.5 (LL) 13.3 - 17.7 g/dL   CBC with platelets   Result Value Ref Range    WBC 4.3 4.0 - 11.0 10e9/L    RBC Count 3.24 (L) 4.4 - 5.9 10e12/L    Hemoglobin 7.6 (L) 13.3 - 17.7 g/dL    Hematocrit 23.7 (L) 40.0 - 53.0 %    MCV 73 (L) 78 - 100 fl    MCH 23.5 (L) 26.5 - 33.0 pg    MCHC 32.1 31.5 - 36.5 g/dL    RDW 24.1 (H) 10.0 - 15.0 %    Platelet Count 140 (L) 150 - 450 10e9/L   Hemoglobin   Result Value Ref Range    Hemoglobin Canceled, Test credited 13.3 - 17.7 g/dL   Troponin I   Result Value Ref Range    Troponin I ES 0.435 (HH) 0.000 - 0.045 ug/L   UPPER GI ENDOSCOPY   Result Value Ref Range    Upper GI Endoscopy       [facility] Endoscopy Department  _______________________________________________________________________________  Patient Name: Shiraz Ingram              Procedure Date: 5/24/2020 8:12 AM  MRN: [id number]                       Account Number: [id number]  YOB: 1934             Admit Type: Inpatient  Age: 85                               Room: ICU  Note Status: Finalized                Attending MD: Isela Mckeon MD  Total  Sedation Time:                  Instrument Name: 401 GIF- Gastroscope  _______________________________________________________________________________     Procedure:                Upper GI endoscopy  Indications:              Melena  Providers:                Isela Mckeon MD, Mary Kay Garcia RN  Referring MD:               Medicines:                Midazolam 3 mg IV, Fentanyl 75 micrograms IV  Complications:            No immediate complications.  _______________________________________________________________________________   Procedure:                Pre-Anesthesia Assessment:                            - Prior to the procedure, a History and Physical                             was performed, and patient medications and                             allergies were reviewed. The patient is competent.                             The risks and benefits of the procedure and the                             sedation options and risks were discussed with the                             patient. All questions were answered and informed                             consent was obtained. Patient identification and                             proposed procedure were verified by the physician                             in the pre-procedure area. Mental Status                             Examination: alert and oriented. Airway                             Examination: normal oropharyngeal airway and neck                             mobility. Respiratory Examination: clear to                             auscultation. CV  Examination: normal. Prophylactic                             Antibiotics: The patient does not require                             prophylactic antibiotics. Prior Anticoagulants: The                             patient has taken no previous anticoagulant or                             antiplatelet agents. ASA Grade Assessment: II - A                             patient with mild systemic disease. After  reviewing                             the risks and benefits, the patient was deemed in                             satisfactory condition to undergo the procedure.                             The anesthesia plan was to use moderate sedation /                             analgesia (conscious sedation). Immediately prior                             to administration of medications, the patient was                             re-assessed for adequacy to receive sedatives. The                             heart rate, respiratory rate, oxygen saturations,                             blood p ressure, adequacy of pulmonary ventilation,                             and response to care were monitored throughout the                             procedure. The physical status of the patient was                             re-assessed after the procedure.                            After obtaining informed consent, the endoscope was                             passed under direct vision. Throughout the                             procedure, the patient's blood pressure, pulse, and                             oxygen saturations were monitored continuously. The                             Endoscope was introduced through the mouth, and                             advanced to the third part of duodenum. The upper                             GI endoscopy was accomplished without difficulty.                             The patient tolerated the procedure well.                                                                                   Findings:       One superficial esophageal u lcer with oozing blood and stigmata of        recent bleeding was found at the gastroesophageal junction. Area was        successfully injected with 2 mL of a 1:10,000 solution of epinephrine        for hemostasis. Coagulation for hemostasis using bipolar probe was        unsuccessful. Coagulation for hemostasis using argon plasma was        successful.  Estimated blood loss: none.       A medium amount of food (residue) was found in the gastric fundus.        Lavage of the area was performed using a moderate amount of sterile        water, resulting in incomplete clearance with fair visualization.       The exam of the stomach was otherwise normal.       Hematin (altered blood/coffee-ground-like material) was found in the        first portion of the duodenum, in the second portion of the duodenum and        in the third portion of the duodenum. Lavage of the area was performed        using a moderate amount of sterile water, resulting in clearance with        fair visualization. Cecilia jones no discrete lesion was found.                                                                                   Impression:               - Bleeding esophageal ulcer. Injected. Treatment                             not successful. Treated with bipolar cautery.                             Treated with argon plasma coagulation (APC).                            - A medium amount of food (residue) in the stomach.                            - Blood in the second portion of the duodenum and                             in the third portion of the duodenum. However no                             discrete lesion was found. It may or may not be                             related to the esophageal bleeding and could be a                             2nd source.                            - No specimens collected.  Recommendation:           - Return patient to ICU for ongoing care.                            - NPO.                            - Continue present medications. IV PPI BID.                             - Given hematin seen in the small bowel and could                             be 2nd source of bleeding, will have patient stays                             in the ICU today to monitor. If bleeding again will                             recommend CTA or red blood cell tag depends on  the                             degree of bleeding. Unlikely colon bleeding given                             hematin seen in the upper GI tract. Continue to                             hold anticoagulation.                                                                                   Procedure Code(s):       --- Professional ---       20514, Esophagogastroduodenoscopy, flexible, transoral; with control of        bleeding, any method  Diagnosis Code(s):       --- Professional ---       K22.11, Ulcer of esophagus with bleeding       K92.2, Gastrointestinal hemorrhage, unspecified       K92.1, Melena (includes Hematochezia)    CPT copyright 2018 [employer]. All rights re served.    The codes documented in this report are preliminary and upon  review may   be revised to meet current compliance requirements.    Electronic Signature by Dr. Isela Mckeon  __________________  Isela Mckeon MD  5/24/2020 9:24:56 AM  I was physically present for the entire viewing portion of the exam.  Isela Mckeon MD  Number of Addenda: 0    Note Initiated On: 5/24/2020 8:12 AM  MRN:                      [id number]  Procedure Date:           5/24/2020 8:12:34 AM  Total Procedure Duration: 0 hours 29 minutes 50 seconds   Estimated Blood Loss:       Scope In: 8:24:53 AM  Scope Out: 8:54:43 AM     Glucose by meter   Result Value Ref Range    Glucose 134 (H) 70 - 99 mg/dL   Hemoglobin   Result Value Ref Range    Hemoglobin 7.2 (L) 13.3 - 17.7 g/dL

## 2020-05-24 NOTE — ED PROVIDER NOTES
Emergency Department Attending Supervision Note  5/23/2020  9:50 PM      I evaluated this patient in conjunction with Alonzo Ferris PA-C.    Briefly, the patient presented with dizziness and melena that started today. He takes daily aspirin and is on Pradaxa for a current clot. Patient has a history of hypertension, stage 3 CKD, CAD, and anemia. Patient's son states that he has been weak with ongoing stomach pain and was seen in the ED for vertigo 2 days ago.    On my exam,     No pain on abdominal exam, black, tarry stool    Results:  ECG (21:09:31):  Rate 88 bpm. AL interval *. QRS duration 148. QT/QTc 394/476. P-R-T axes * -60 58. Atrial fibrillation with premature ventricular or aberrantly conducted complexes. Right bundle branch block. Bifascicular block. Interpreted at 2133 by Piyush Lemons MD.    INR: 2.18(H)  CBC: HGB 6.7(LL) o/w within normal limits (WBC 8.1, )  CMP: glucose 189(H), BUN 61(H), creatinine 1.62(H), GFR estimate 38(L), albumin 3.3(L), protein total 6.3(L) o/w within normal limit   ABO/Rh type and screen: ABO A, RH(D) positive, antibody screen negative  Lactic Acid (Resulted 2126): 6.0(HH)  Occult blood stool: positive(A)    CT Abdomen/Pelvis w/o contrast  pending    ED course:  (2040)   ALICJA Ferris performed an exam of the patient. Please see his note for details.      EKG obtained in the ED, see results above.      IV was inserted and blood was drawn for laboratory testing, results above.     (2100)   ALICJA Ferris, finished performing my exam on the patient, as he had to have a bowel movement prior to completion of my initial exam. Service was shared with me.      The patient provided a stool sample here in the emergency department. This was sent for laboratory testing, findings above.     The patient was sent for a CT abdomen pelvis w/o contrast while in the emergency department, results above.      (2153)   ALICJA Ferris, spoke with Dr. Fournier of the Gastroenterology service regarding  patient's presentation, findings, and plan of care.      (2156)   Spoke with pharmacy about the patient's medication.      (2216)   ALICJA Ferris, spoke with Dr. Alford of the Hospitalist service regarding patient's presentation, findings, and plan of care, who agrees to accept patient for further care, evaluation and monitoring.     (2236) I spoke with Dr. Fournier of Gastroenterology regarding patient's presentation, findings, and plan of care.    Findings and plan explained to the Patient and son who consents to admission. Patient was discussed with Dr. Alford, who will admit the patient to a bed for further monitoring, evaluation, and treatment.    My impression is acute GI bleed.  Nurse noted that it looks like blueberry pie with globs of black pieces.  Son notes that it was black, black, black.  He has been taking some Pepto-Bismol which could be contributing to it as well.  Did not see anything maroon looking in the rectum when I looked in my finger across his rectal region trying to assess lower GI versus upper GI bleed.  Hemoglobin is dropped.  Initially blood pressure was low but did improve with IV fluids.  Blood is ordered.  Protonix is given.  Initially ordered Pradaxa reversal when blood pressure was low although there is only 1 dose in the entire hospital and patient is now looking to be more stable and does have a clot in his appendage so risk-benefit ratio is felt that can hold off on the reversal.  Patient be admitted to the ICU ongoing care and possible emergent endoscopy if needed.    Diagnosis    ICD-10-CM    1. Upper GI bleed  K92.2 ABO/Rh type and screen     Blood component     Blood component   2. Anemia due to GI blood loss  D50.0    3. Elevated lactic acid level  R79.89        Scribe Disclosure:  I, Viky Rondon, am serving as a scribe at 9:50 PM on 5/23/2020 to document services personally performed by Piyush Lemons MD based on my observations and the provider's statements to me.               Piyush Lemons MD  05/23/20 3560

## 2020-05-24 NOTE — PROGRESS NOTES
Discussed  needs with  Services. Requested for one hour in the morning and one hour in the evening; however, a longer duration and an increase in frequency was recommended.    Will plan for an  daily from:  [id number]  [id number]  [id number]

## 2020-05-24 NOTE — ED NOTES
DATE:  5/23/2020   TIME OF RECEIPT FROM LAB:  9:27 PM  LAB TEST:  Hbg  LAB VALUE:  6.7  RESULTS GIVEN WITH READ-BACK TO (PROVIDER): Verbalized to ED Provider Etten  TIME LAB VALUE REPORTED TO PROVIDER:   9:28 PM

## 2020-05-24 NOTE — PLAN OF CARE
Afib, hemodynamically stable, A&Ox4, no bloody stools, does not complain of pain, nausea. 2 units of RBC transfused for  HBG 6.7.

## 2020-05-24 NOTE — PHARMACY-ADMISSION MEDICATION HISTORY
Pharmacy Medication History  Admission medication history interview status for the 5/23/2020  admission is complete. See EPIC admission navigator for prior to admission medications     Medication history sources: Patient's family/friend (Nicko Son)  Medication history source reliability: Good  Adherence assessment: Good    Significant changes made to the medication list:  none      Additional medication history information:   none    Medication reconciliation completed by provider prior to medication history? No    Time spent in this activity: 15 minutes      Prior to Admission medications    Medication Sig Last Dose Taking? Auth Provider   albuterol (PROAIR HFA) 108 (90 Base) MCG/ACT inhaler INHALE 1 TO 2 PUFFS EVERY 4 TO 6 HOURS AS NEEDED  at prn Yes Dany Rodriguez MD   aspirin (ASA) 81 MG EC tablet Take 1 tablet (81 mg) by mouth daily 5/23/2020 at am Yes Manasa Wang MD   budesonide (PULMICORT) 0.5 MG/2ML nebulizer solution Take 2 mLs (0.5 mg) by nebulization 2 times daily 5/23/2020 at am Yes Dany Rodriguez MD   calcium carbonate 600 mg-vitamin D 400 units (CALTRATE) 600-400 MG-UNIT per tablet Take 1 tablet by mouth daily 5/23/2020 at am Yes Unknown, Entered By History   dabigatran ANTICOAGULANT (PRADAXA) 150 MG capsule Take 1 capsule (150 mg) by mouth 2 times daily Store in original 's bottle or blister pack; use within 120 days of opening. 5/23/2020 at am Yes Ryan Samuel MD   ferrous sulfate (IRON) 325 (65 Fe) MG tablet TAKE 1 TABLET(325 MG) BY MOUTH TWICE DAILY 5/23/2020 at am Yes Dany Rodriguez MD   ipratropium - albuterol 0.5 mg/2.5 mg/3 mL (DUONEB) 0.5-2.5 (3) MG/3ML nebulization Inhale 1 vial (3 mLs) into the lungs 4 times daily 5/23/2020 at am Yes Dany Rodriguez MD   meclizine (ANTIVERT) 12.5 MG tablet Take 1 tablet (12.5 mg) by mouth 4 times daily as needed for dizziness  at prn Yes Trish Tiwari MD   montepadminikast (SINGULAIR)  10 MG tablet TAKE 1 TABLET(10 MG) BY MOUTH DAILY 5/23/2020 at am Yes Dany Rodriguez MD   multivitamin, therapeutic (THERA-VIT) TABS tablet Take 1 tablet by mouth daily 5/23/2020 at am Yes Unknown, Entered By History   simvastatin (ZOCOR) 40 MG tablet Take 1 tablet (40 mg) by mouth At Bedtime 5/22/2020 at pm Yes Dany Rodriguez MD Chelsea Mayer, PharmD  Inpatient Clinical Pharmacist  [phone]

## 2020-05-24 NOTE — H&P
[facility]    History and Physical  Hospitalist       Date of Admission:  5/23/2020    Assessment & Plan   Shiraz Ingram is a 85 year old male with a history of coronary artery disease status post CABG in the early 2000's, severe aortic stenosis with plans for TAVR in the near future, atrial fibrillation on chronic anticoagulation, chronic diastolic congestive heart failure, left atrial appendage thrombosis, hypertension, hyperlipidemia, moderate persistent asthma, CKD stage III, senile hearing loss, and chronic anemia with baseline hemoglobin around 11 who presented to the ER with lightheadedness and melena.  After evaluation in the ER, there was concern for an acute GI bleed.  He was given Protonix and IV fluids.  He is chronically on Pradaxa and there was consideration for Praxbind however the decision was made to hold off for now given his hemodynamic stability.  GI was consulted and recommended admission to the ICU.  The hospitalist service has been contacted to admit him.    Acute GI bleed  Acute blood loss anemia  Chronic anemia  -Has been lightheaded for for a few days prior to admission, developed melena on the day of admission.  Nauseous with some dry heaves but no emesis.  -Hemoglobin 6.7, down from 8.4 two days prior to admission and baseline of around 11 earlier this month.  -Stool was Hemoccult positive in the ER.  -Hemodynamically stable in the ER, however lactic acid was elevated to 6.0.  -Admit to ICU.  -GI consulted, planning endoscopy in a.m. or sooner if there is any sign of decompensation.  Appreciate their assistance.  -Received 1 L of normal saline in the ER and 2 units of packed red blood cells have also been ordered.  -Received IV Protonix in the ER, will start continuous Protonix infusion.  -Recheck lactic acid.  -Praxbind was considered in the ER, however it was not given due to his hemodynamic stability and in consideration of his recent left atrial appendage thrombus.   Agree with holding Pradaxa for now, if any signs of decompensation, consider giving Praxbind.    COVID-19 testing  -Asymptomatic COVID-19 testing obtained in anticipation of endoscopy tomorrow.    Coronary artery disease status post CABG in the early 2000's  -Currently asymptomatic.  -Troponin 0 0.035.  -Hold PTA aspirin and simvastatin for now.  -Recheck troponin.    Severe aortic stenosis  -Followed by cardiology, has been undergoing TAVR work-up with plans for TAVR in the next month or 2.  -Cardiology will be consulted as noted below.    Atrial fibrillation  -Not on any chronic rate or rhythm control medications.  Heart rate in the 80s in the ER.  -Chronically anticoagulated with Pradaxa, hold for now in setting of GI bleed.    Left atrial appendage thrombosis  -Diagnosed during work-up for TAVR earlier this year in February.  -Treated with Pradaxa.  -He had a mammogram in April 2020 which did not show any clear-cut thrombus, but in some views did show an early/P clot appearance.  -Hold Pradaxa for now as noted above.  -Consult cardiology regarding further management.    Chronic diastolic congestive heart failure  -Not on a diuretic at baseline.  -Monitor volume status closely as he will be getting blood and IV fluids in the setting of a GI bleed.    Hypertension  -Blood pressure okay in the ER, 120s over 60s.  -It does not appear that he is on any chronic medication for blood pressure.    Hyperlipidemia  -Hold PTA statin for now, reassess after further evaluation of GI bleed.    Moderate persistent asthma  -Currently asymptomatic.  -Continue PTA duo nebs and Pulmicort.    Chronic kidney disease stage III  -Baseline creatinine is in the 1.4-1.7 range.   -Creatinine stable at 1.62 this evening.  -Recheck labs in a.m.    Senile hearing loss  -His son was present in the ER and translated with American sign language.  -Recommend having an  available.  -When an  is not  available, he can communicate by writing.    DVT Prophylaxis: Pneumatic Compression Devices  Code Status: DNR / DNI per discussion with patient and his son this evening  Expected discharge: 2 - 3 days, recommended to prior living arrangement once GI bleed appropriately treated.    Arcadio Alford MD    Primary Care Physician   Dany Rodriguez MD    Chief Complaint   Lightheadedness, melena    History is obtained from the patient and his son    History of Present Illness   Shiraz Ingram is a 85 year old male with a history of coronary artery disease status post CABG in the early 2000's, severe aortic stenosis with plans for TAVR in the near future, atrial fibrillation on chronic anticoagulation, chronic diastolic congestive heart failure, left atrial appendage thrombosis, hypertension, hyperlipidemia, moderate persistent asthma, CKD stage III, senile hearing loss, and chronic anemia with baseline hemoglobin around 11 who presented to the ER with lightheadedness and melena.  He has severe hearing loss and communicates through sign language.  His son was in the ER and assisted with translation.  The patient has been feeling weak and dizzy over the last few days.  He was actually seen in the ER 2 days ago for vertigo and was prescribed as needed meclizine.  The dizziness has not improved.  Earlier today, the patient started having dark tarry stools.  He has had some lower abdominal discomfort.  He has been nauseous and had some dry heaves but has not thrown up.  Due to these symptoms, they came into the ER tonight for evaluation.    In the ER, he was evaluated by Dr. Piyush Lemons.  He was hemodynamically stable.  Labs revealed a hemoglobin of 6.7, down from 8.4 two days ago and 11-12 earlier this month.  Lactic acid was significantly elevated at 6.0.  He was given a bolus of normal saline and IV Protonix.  2 units of packed red blood cells were ordered.  He is chronically anticoagulated with Pradaxa.  Praxbind  was considered but not given.  GI was consulted and recommended admission to the ICU with plan for endoscopy in the morning, or sooner if any signs of decompensation.  The hospitalist service was contacted to admit the patient.    He denies any recent fevers, chills, or sweats.  No chest pain, cough, or shortness of breath.  No urinary complaints.  No skin changes.  Denies any recent changes to his medications other than the addition of PRN meclizine for vertigo.  He denies any prior history of GI bleed.  He states he has never had an EGD or colonoscopy before although his chart reveals a history of multiple colonoscopies.  He takes a baby aspirin once daily but denies any other NSAID use.  He does not drink any alcohol.    Past Medical History    I have reviewed this patient's medical history and updated it with pertinent information if needed.   Past Medical History:   Diagnosis Date     Allergic rhinitis, cause unspecified      Anemia, unspecified      Aortic stenosis     severe     Benign neoplasm of colon 1999    Ademonatous polyp     CKD (chronic kidney disease) stage 3, GFR 30-59 ml/min (H) 5/12/2011     Coronary atherosclerosis of unspecified type of vessel, native or graft     s/p CABG 2002     Hyperlipidemia LDL goal < 100      Hypertension goal BP (blood pressure) < 140/90      Localized osteoarthrosis not specified whether primary or secondary, lower leg     left knee     Moderate persistent asthma      Persistent atrial fibrillation      Unspecified hearing loss      Past Surgical History   I have reviewed this patient's surgical history and updated it with pertinent information if needed.  Past Surgical History:   Procedure Laterality Date     C NONSPECIFIC PROCEDURE  5/02    Coronary artery bypass     CARDIAC SURGERY       CV ANGIOGRAM CORONARY GRAFT N/A 4/24/2020    Procedure: Angiogram Coronary Graft;  Surgeon: Addison Willard MD;  Location: [facility] CARDIAC CATH LAB     CV CORONARY ANGIOGRAM  N/A 4/24/2020    Procedure: Coronary Angiogram;  Surgeon: Addison Willard MD;  Location:  HEART CARDIAC CATH LAB     CV RIGHT HEART CATH N/A 4/24/2020    Procedure: Right Heart Cath;  Surgeon: Addison Willard MD;  Location:  HEART CARDIAC CATH LAB     HC COLONOSCOPY THRU STOMA W BIOPSY/CAUTERY TUMOR/POLYP/LESION  5/03    Dr. Dow, Q 5 years     HC COLONOSCOPY THRU STOMA W BIOPSY/CAUTERY TUMOR/POLYP/LESION  12/199    Dr. Dow, one adenomatous polyp     HC ESOPHAGOSCOPY, DIAGNOSTIC  5/03    Dr. Dow, lower esophageal ring & mild gastritis     HERNIORRHAPHY INGUINAL Right 9/26/2016    Procedure: HERNIORRHAPHY INGUINAL;  Surgeon: Alexis Alexandra MD;  Location: [facility]     LAPAROSCOPIC CHOLECYSTECTOMY  12/03    for biliary sludge     Prior to Admission Medications   Prior to Admission Medications   Prescriptions Last Dose Informant Patient Reported? Taking?   albuterol (PROAIR HFA) 108 (90 Base) MCG/ACT inhaler  at prn Son No Yes   Sig: INHALE 1 TO 2 PUFFS EVERY 4 TO 6 HOURS AS NEEDED   aspirin (ASA) 81 MG EC tablet 5/23/2020 at am Son No Yes   Sig: Take 1 tablet (81 mg) by mouth daily   budesonide (PULMICORT) 0.5 MG/2ML nebulizer solution 5/23/2020 at am Son No Yes   Sig: Take 2 mLs (0.5 mg) by nebulization 2 times daily   calcium carbonate 600 mg-vitamin D 400 units (CALTRATE) 600-400 MG-UNIT per tablet 5/23/2020 at am Son Yes Yes   Sig: Take 1 tablet by mouth daily   dabigatran ANTICOAGULANT (PRADAXA) 150 MG capsule 5/23/2020 at am Son No Yes   Sig: Take 1 capsule (150 mg) by mouth 2 times daily Store in original 's bottle or blister pack; use within 120 days of opening.   ferrous sulfate (IRON) 325 (65 Fe) MG tablet 5/23/2020 at am Son No Yes   Sig: TAKE 1 TABLET(325 MG) BY MOUTH TWICE DAILY   ipratropium - albuterol 0.5 mg/2.5 mg/3 mL (DUONEB) 0.5-2.5 (3) MG/3ML nebulization 5/23/2020 at am Son No Yes   Sig: Inhale 1 vial (3 mLs) into the lungs 4 times daily   meclizine  (ANTIVERT) 12.5 MG tablet  at prn  No Yes   Sig: Take 1 tablet (12.5 mg) by mouth 4 times daily as needed for dizziness   montelukast (SINGULAIR) 10 MG tablet 2020 at am Son No Yes   Sig: TAKE 1 TABLET(10 MG) BY MOUTH DAILY   multivitamin, therapeutic (THERA-VIT) TABS tablet 2020 at am  Yes Yes   Sig: Take 1 tablet by mouth daily   simvastatin (ZOCOR) 40 MG tablet 2020 at pm Son No Yes   Sig: Take 1 tablet (40 mg) by mouth At Bedtime      Facility-Administered Medications: None     Allergies   Allergies   Allergen Reactions     No Known Drug Allergies      Social History   I have reviewed this patient's social history and updated it with pertinent information if needed. Shiraz Ingram  reports that he has quit smoking. His smoking use included cigarettes. He has never used smokeless tobacco. He reports that he does not drink alcohol or use drugs.    Family History   I have reviewed this patient's family history and updated it with pertinent information if needed.   Family History   Problem Relation Age of Onset     C.A.D. Father      Cancer Mother         breast cancer,  of pneumonia     Cerebrovascular Disease Son      CABG Son      Family History Negative Child      Family History Negative Sister      Heart Disease Brother      Review of Systems   The 10 point Review of Systems is negative other than noted in the HPI or here.    Physical Exam   Temp: 99.3  F (37.4  C) Temp src: Temporal BP: 125/50 Pulse: 87 Heart Rate: 91 Resp: 16 SpO2: 94 %      Vital Signs with Ranges  Temp:  [96.8  F (36  C)-99.3  F (37.4  C)] 99.3  F (37.4  C)  Pulse:  [84-93] 87  Heart Rate:  [80-98] 91  Resp:  [10-24] 16  BP: ()/(50-88) 125/50  SpO2:  [94 %-100 %] 94 %  0 lbs 0 oz    Constitutional: awake, alert, cooperative, no apparent distress  Eyes: sclera clear, conjunctiva normal  ENT: oral pharynx with moist mucous membranes  Respiratory: clear to auscultation bilaterally, no crackles or  wheezing  Cardiovascular: irregular rhythm, normal rate, normal S1 and S2, systolic murmur noted  GI: normal bowel sounds, soft, non-distended, mild lower abdominal tenderness  Skin: warm, dry  Musculoskeletal: no lower extremity pitting edema present  Neurologic: awake, alert, answers questions appropriately with his son translating with american sign language    Data   Data reviewed today:  I personally reviewed no images or EKG's today.    Recent Labs   Lab 05/23/20 2104 05/21/20 2008 05/18/20  1305   WBC 8.1 4.5  --    HGB 6.7* 8.4*  --    MCV 68* 65*  --     189  --    INR 2.18*  --   --     132* 132*   POTASSIUM 4.9 4.6 5.2   CHLORIDE 106 100 98   CO2 23 25 25   BUN 61* 57* 44*   CR 1.62* 1.66* 1.44*   ANIONGAP 8 7 9   AYALA 9.3 9.1 10.1   * 116* 104*   ALBUMIN 3.3*  --   --    PROTTOTAL 6.3*  --   --    BILITOTAL 0.7  --   --    ALKPHOS 43  --   --    ALT 23  --   --    AST 20  --   --    TROPI  --  0.035  --      No results found for this or any previous visit (from the past 24 hour(s)).

## 2020-05-24 NOTE — ED NOTES
Assumed care.    Repeat lactate to be sent prior to admission.    Shweta Tamez RN,.......................................... 5/24/2020   12:01 AM

## 2020-05-24 NOTE — CONSULTS
GI aware of the consult and will see pt tomorrow first thing in the am. Also planning for EGD after resuscitation.    In summary:   85 year old male w/ multiple recent admission due to electrolytes disturbance w/ complex medical history including CAD, CABG, atrial fibrillation on anticoagulation with Pradaxa, known left atrial appendage clot, severe aortic stenosis upcoming plans for TAVR, stage III CKD, moderate persistent, chronic anemia and thrombocytopenia who presented w/ dizziness and melena, Hgb found to be 6.7 from 8.4 two days ago. He also has lactic acidosis.  -c/w NPO  -IV PPI w/ 80 mg loading dose  -pt's INR is elevated to 2.18, while using NOAC can slightly elevate INR but this number is likely an indication of overdose, will recommend Kcentra  -pRBC resuscitation to Hgb >7 or close to 9 if repeat troponin elevated  -recommend COVID testing given plan procedure per hospital protocol  -EGD first thing in the am or earlier if pt acutely decompensates    Isela Mckeon MD (Richard)  Provider's Cell: [phone]     [facility] GI consultant PA  [phone]

## 2020-05-24 NOTE — PROGRESS NOTES
Pt received all nebs per MD order. Pt has been on RA t/o shift. BS diminished.  RT will continue to follow,    Santa Dang, RT

## 2020-05-24 NOTE — ED NOTES
[facility]  ED Nurse Handoff Report    ED Chief complaint: Dizziness and Melena      ED Diagnosis:   Final diagnoses:   Upper GI bleed   Anemia due to GI blood loss   Elevated lactic acid level       Code Status: Check with MD    Allergies:   Allergies   Allergen Reactions     No Known Drug Allergies        Patient Story: Pt. Has been dizzy for a few days, got worse today with black stools.     Focused Assessment:  Pt. Alert, uses sign language. Pt. Has no pain except just before a BM, lower abdominal pain. Pt. Has had several black stools this afternoon. Son brings pt. In and is interpreting     Treatments and/or interventions provided: Labs, IV fluids, CT  Patient's response to treatments and/or interventions: Well    To be done/followed up on inpatient unit:  Monitor    Does this patient have any cognitive concerns?: None    Activity level - Baseline/Home:  Stand with Assist  Activity Level - Current:   Stand with Assist    Patient's Preferred language: American Sign Language   Needed?: Yes    Isolation: None  Infection: Not Applicable  Bariatric?: No    Vital Signs:   Vitals:    05/23/20 2135 05/23/20 2145 05/23/20 2154 05/23/20 2155   BP:  (!) 82/64 116/88    Pulse:  84 93    Resp: 24 17 10 22   Temp:       TempSrc:       SpO2: 95%  96% 94%       Cardiac Rhythm:     Was the PSS-3 completed:   YES    What interventions are required if any?    NOne          Family Comments: Son is here with Son to help with communication.  OBS brochure/video discussed/provided to patient/family: N/A              Name of person given brochure if not patient: NA              Relationship to patient: NA    For the majority of the shift this patient's behavior was Green.   Behavioral interventions performed were None.    ED NURSE PHONE NUMBER: RN 4

## 2020-05-24 NOTE — ED TRIAGE NOTES
Patient seen here 2 days ago. Today was with severe dizziness, family called 911. EMS came but patient didn't want to ride with. Patient had persistent symptoms and dedcided to come to ED. Patient had black stool while in ED lobby bathroom.

## 2020-05-25 ENCOUNTER — OFFICE VISIT (OUTPATIENT)
Dept: INTERPRETER SERVICES | Facility: CLINIC | Age: 85
End: 2020-05-25
Payer: COMMERCIAL

## 2020-05-25 ENCOUNTER — APPOINTMENT (OUTPATIENT)
Dept: NUCLEAR MEDICINE | Facility: CLINIC | Age: 85
DRG: 380 | End: 2020-05-25
Attending: HOSPITALIST
Payer: COMMERCIAL

## 2020-05-25 LAB
ABO + RH BLD: NORMAL
ABO + RH BLD: NORMAL
ANION GAP SERPL CALCULATED.3IONS-SCNC: 5 MMOL/L (ref 3–14)
BLD GP AB SCN SERPL QL: NORMAL
BLD PROD TYP BPU: NORMAL
BLD PROD TYP BPU: NORMAL
BLD UNIT ID BPU: 0
BLOOD BANK CMNT PATIENT-IMP: NORMAL
BLOOD PRODUCT CODE: NORMAL
BPU ID: NORMAL
BUN SERPL-MCNC: 53 MG/DL (ref 7–30)
CALCIUM SERPL-MCNC: 8.1 MG/DL (ref 8.5–10.1)
CHLORIDE SERPL-SCNC: 115 MMOL/L (ref 94–109)
CO2 SERPL-SCNC: 24 MMOL/L (ref 20–32)
CREAT SERPL-MCNC: 1.49 MG/DL (ref 0.66–1.25)
ERYTHROCYTE [DISTWIDTH] IN BLOOD BY AUTOMATED COUNT: 24.6 % (ref 10–15)
GFR SERPL CREATININE-BSD FRML MDRD: 42 ML/MIN/{1.73_M2}
GLUCOSE BLDC GLUCOMTR-MCNC: 98 MG/DL (ref 70–99)
GLUCOSE SERPL-MCNC: 93 MG/DL (ref 70–99)
HCT VFR BLD AUTO: 23.4 % (ref 40–53)
HGB BLD-MCNC: 7.4 G/DL (ref 13.3–17.7)
HGB BLD-MCNC: 7.9 G/DL (ref 13.3–17.7)
HGB BLD-MCNC: 8.3 G/DL (ref 13.3–17.7)
HGB BLD-MCNC: 9.5 G/DL (ref 13.3–17.7)
LACTATE BLD-SCNC: 1.5 MMOL/L (ref 0.7–2)
MAGNESIUM SERPL-MCNC: 1.9 MG/DL (ref 1.6–2.3)
MCH RBC QN AUTO: 24.2 PG (ref 26.5–33)
MCHC RBC AUTO-ENTMCNC: 31.6 G/DL (ref 31.5–36.5)
MCV RBC AUTO: 77 FL (ref 78–100)
NUM BPU REQUESTED: 4
PHOSPHATE SERPL-MCNC: 2.7 MG/DL (ref 2.5–4.5)
PLATELET # BLD AUTO: 124 10E9/L (ref 150–450)
POTASSIUM SERPL-SCNC: 4.4 MMOL/L (ref 3.4–5.3)
RBC # BLD AUTO: 3.06 10E12/L (ref 4.4–5.9)
SODIUM SERPL-SCNC: 144 MMOL/L (ref 133–144)
SPECIMEN EXP DATE BLD: NORMAL
TRANSFUSION STATUS PATIENT QL: NORMAL
TRANSFUSION STATUS PATIENT QL: NORMAL
TROPONIN I SERPL-MCNC: 0.25 UG/L (ref 0–0.04)
WBC # BLD AUTO: 3.6 10E9/L (ref 4–11)

## 2020-05-25 PROCEDURE — 40000275 ZZH STATISTIC RCP TIME EA 10 MIN

## 2020-05-25 PROCEDURE — 36415 COLL VENOUS BLD VENIPUNCTURE: CPT | Performed by: HOSPITALIST

## 2020-05-25 PROCEDURE — 25000128 H RX IP 250 OP 636: Performed by: HOSPITALIST

## 2020-05-25 PROCEDURE — 84100 ASSAY OF PHOSPHORUS: CPT | Performed by: HOSPITALIST

## 2020-05-25 PROCEDURE — T1013 SIGN LANG/ORAL INTERPRETER: HCPCS | Mod: U3

## 2020-05-25 PROCEDURE — 25000132 ZZH RX MED GY IP 250 OP 250 PS 637: Performed by: INTERNAL MEDICINE

## 2020-05-25 PROCEDURE — P9016 RBC LEUKOCYTES REDUCED: HCPCS | Performed by: PHYSICIAN ASSISTANT

## 2020-05-25 PROCEDURE — 99233 SBSQ HOSP IP/OBS HIGH 50: CPT | Performed by: INTERNAL MEDICINE

## 2020-05-25 PROCEDURE — 85018 HEMOGLOBIN: CPT | Performed by: HOSPITALIST

## 2020-05-25 PROCEDURE — 25000125 ZZHC RX 250: Performed by: HOSPITALIST

## 2020-05-25 PROCEDURE — 25800030 ZZH RX IP 258 OP 636: Performed by: HOSPITALIST

## 2020-05-25 PROCEDURE — 94640 AIRWAY INHALATION TREATMENT: CPT | Mod: 76

## 2020-05-25 PROCEDURE — 12000047 ZZH R&B IMCU

## 2020-05-25 PROCEDURE — 00000146 ZZHCL STATISTIC GLUCOSE BY METER IP

## 2020-05-25 PROCEDURE — 85027 COMPLETE CBC AUTOMATED: CPT | Performed by: HOSPITALIST

## 2020-05-25 PROCEDURE — 25000132 ZZH RX MED GY IP 250 OP 250 PS 637: Performed by: HOSPITALIST

## 2020-05-25 PROCEDURE — 34300033 ZZH RX 343: Performed by: HOSPITALIST

## 2020-05-25 PROCEDURE — A9560 TC99M LABELED RBC: HCPCS | Performed by: HOSPITALIST

## 2020-05-25 PROCEDURE — 94640 AIRWAY INHALATION TREATMENT: CPT

## 2020-05-25 PROCEDURE — 84484 ASSAY OF TROPONIN QUANT: CPT | Performed by: HOSPITALIST

## 2020-05-25 PROCEDURE — 80048 BASIC METABOLIC PNL TOTAL CA: CPT | Performed by: HOSPITALIST

## 2020-05-25 PROCEDURE — 99232 SBSQ HOSP IP/OBS MODERATE 35: CPT | Performed by: HOSPITALIST

## 2020-05-25 PROCEDURE — 83735 ASSAY OF MAGNESIUM: CPT | Performed by: HOSPITALIST

## 2020-05-25 PROCEDURE — C9113 INJ PANTOPRAZOLE SODIUM, VIA: HCPCS | Performed by: HOSPITALIST

## 2020-05-25 PROCEDURE — 78278 ACUTE GI BLOOD LOSS IMAGING: CPT

## 2020-05-25 PROCEDURE — 83605 ASSAY OF LACTIC ACID: CPT | Performed by: HOSPITALIST

## 2020-05-25 RX ORDER — POLYETHYLENE GLYCOL 3350 17 G/17G
238 POWDER, FOR SOLUTION ORAL ONCE
Status: COMPLETED | OUTPATIENT
Start: 2020-05-25 | End: 2020-05-25

## 2020-05-25 RX ORDER — NITROGLYCERIN 0.4 MG/1
0.4 TABLET SUBLINGUAL EVERY 5 MIN PRN
Status: DISCONTINUED | OUTPATIENT
Start: 2020-05-25 | End: 2020-05-27

## 2020-05-25 RX ORDER — BISACODYL 5 MG
20 TABLET, DELAYED RELEASE (ENTERIC COATED) ORAL ONCE
Status: COMPLETED | OUTPATIENT
Start: 2020-05-25 | End: 2020-05-25

## 2020-05-25 RX ORDER — LIDOCAINE 40 MG/G
CREAM TOPICAL
Status: DISCONTINUED | OUTPATIENT
Start: 2020-05-25 | End: 2020-05-27

## 2020-05-25 RX ORDER — SODIUM CHLORIDE, SODIUM LACTATE, POTASSIUM CHLORIDE, CALCIUM CHLORIDE 600; 310; 30; 20 MG/100ML; MG/100ML; MG/100ML; MG/100ML
INJECTION, SOLUTION INTRAVENOUS CONTINUOUS
Status: DISCONTINUED | OUTPATIENT
Start: 2020-05-26 | End: 2020-05-26

## 2020-05-25 RX ADMIN — BUDESONIDE 0.5 MG: 0.5 INHALANT RESPIRATORY (INHALATION) at 07:11

## 2020-05-25 RX ADMIN — SODIUM CHLORIDE 8 MG/HR: 9 INJECTION, SOLUTION INTRAVENOUS at 12:35

## 2020-05-25 RX ADMIN — Medication 27 MILLICURIE: at 11:20

## 2020-05-25 RX ADMIN — POLYETHYLENE GLYCOL 3350 238 G: 17 POWDER, FOR SOLUTION ORAL at 20:05

## 2020-05-25 RX ADMIN — IPRATROPIUM BROMIDE AND ALBUTEROL SULFATE 3 ML: .5; 3 SOLUTION RESPIRATORY (INHALATION) at 07:11

## 2020-05-25 RX ADMIN — SODIUM CHLORIDE, POTASSIUM CHLORIDE, SODIUM LACTATE AND CALCIUM CHLORIDE: 600; 310; 30; 20 INJECTION, SOLUTION INTRAVENOUS at 23:40

## 2020-05-25 RX ADMIN — IPRATROPIUM BROMIDE AND ALBUTEROL SULFATE 3 ML: .5; 3 SOLUTION RESPIRATORY (INHALATION) at 15:13

## 2020-05-25 RX ADMIN — BUDESONIDE 0.5 MG: 0.5 INHALANT RESPIRATORY (INHALATION) at 19:29

## 2020-05-25 RX ADMIN — SIMVASTATIN 40 MG: 40 TABLET, FILM COATED ORAL at 21:14

## 2020-05-25 RX ADMIN — PROCHLORPERAZINE EDISYLATE 5 MG: 5 INJECTION INTRAMUSCULAR; INTRAVENOUS at 22:52

## 2020-05-25 RX ADMIN — BISACODYL 20 MG: 5 TABLET, COATED ORAL at 20:08

## 2020-05-25 RX ADMIN — ONDANSETRON 4 MG: 2 INJECTION INTRAMUSCULAR; INTRAVENOUS at 21:44

## 2020-05-25 RX ADMIN — SODIUM CHLORIDE 8 MG/HR: 9 INJECTION, SOLUTION INTRAVENOUS at 23:04

## 2020-05-25 RX ADMIN — IPRATROPIUM BROMIDE AND ALBUTEROL SULFATE 3 ML: .5; 3 SOLUTION RESPIRATORY (INHALATION) at 19:29

## 2020-05-25 ASSESSMENT — ACTIVITIES OF DAILY LIVING (ADL)
ADLS_ACUITY_SCORE: 16

## 2020-05-25 NOTE — PROGRESS NOTES
[facility]    Hospitalist Progress Note      Assessment & Plan   Shiraz Ingram is a 85 year old male who was admitted on 5/23/2020 with lightheadedness and melena, found to have acute GI bleed requiring blood transfusion.    Acute GI bleed, bleeding esophageal ulcer along with likely additional bleed in small vs large intestine  Acute blood loss anemia  Chronic anemia  Lightheaded for for a few days PTA, developed melena on the day of admission.  Nauseous with some dry heaves but no emesis. Hemoglobin 6.7, down from 8.4 two days prior to admission and baseline of around 11 earlier this month. Hemoccult positive in the ER. Hemodynamically stable in the ER, however lactic acid was elevated to 6.0.  EGD 5/24 found bleeding esophageal ulcer treated with cautery and APC after failed injection. RBC tagged study confirms left mid-abdomen bleed on 5/25.  - IMC monitoring for next 24 hours  - GI consulted, recommendations appreciated  - Transfused 4 units PRBCs since admission  - Protonix infusion  - NPO at midnight, clear liquids 5/25  - Plan for colonoscopy followed by push enteroscopy if negative on 5/26--bowel prep 5/25  - Pradaxa on hold  - Resume gentle IVF at midnight when NPO again with LR  - Goal Hgb 8-9 considering cardiac history and elevated troponin  - Conditional orders entered for hemoglobin <8 to transfuse 1 unit PRBCs--okay to hold blood transfusion if hgb>7.5 and monitor on repeat hgb check  - hgb checks q6h  - PT consult for 5/26     COVID-19 testing--negative  -Asymptomatic COVID-19 testing obtained in anticipation of endoscopy (negative)      Coronary artery disease status post CABG in the early 2000's  NSTEMI, type 2 secondary to demand  Asymptomatic.  -Troponin 0 0.035 two days prior to admission, trop 0.435--0.410--0.367--0.247 on admit. Likely all in setting of demand with acute GI bleed, no chest pain noted  - Hold PTA aspirin    - Resume simvastatin     Severe aortic  stenosis  Followed by cardiology, has been undergoing TAVR work-up with plans for TAVR in the next month or 2.  - Cardiology consult appreciated     Atrial fibrillation  -Not on any chronic rate or rhythm control medications.  Heart rate in the 80s in the ER.  -Chronically anticoagulated with Pradaxa, hold for now in setting of GI bleed.     Left atrial appendage thrombosis  -Diagnosed during work-up for TAVR earlier this year in February.  -Treated with Pradaxa.  -He had a echocardiogram in April 2020 which did not show any clear-cut thrombus, but in some views did show an early/P clot appearance.  -Hold Pradaxa for now as noted above.     Chronic diastolic congestive heart failure  -Not on a diuretic at baseline. Echo as above with EF 50-55% with moderate concentric LVH, moderate decreased RVSF  -Monitor volume status closely as he will be getting blood and IV fluids in the setting of a GI bleed.     Hypertension  -PTA not on any medications for BP control, lower BPs in setting of GI bleed  - Monitor     Hyperlipidemia  - Resume PTA statin     Moderate persistent asthma  -Currently asymptomatic.  -Continue PTA duo nebs QID and Pulmicort BID     Chronic kidney disease stage III  -Baseline creatinine is in the 1.4-1.7 range.   -Cr 1.62 on admit, trended to 1.49 overnight  -Recheck labs in a.m.     Senile hearing loss  Uses ASL to communicate, plans for one at bedside as noted in nursing note  - Otherwise communicates well via writing    DVT Prophylaxis: Pneumatic Compression Devices  Code Status: DNR/DNI  Expected discharge: 2-3 days, pending resolution of bleeding and stabilization of hgb.  Transfer orders for C status entered    Roselia Parson, DO  Text Page (7am - 6pm)    Interval History   Patient seen and examined. Discussed results of RBC scan with him and plans for tomorrow. No chest pain, lightheaded-ness, dizziness. Denies abdominal or throat discomfort. Communicated via writing today (seen in between ASL  interpretors)  Asked that I update his son Nicko, I did call at 1500 to give brief update in plan of care.  Discussed plan of care with Dr Mckeon as well as nuclear medicine    -Data reviewed today: I reviewed all new labs and imaging results over the last 24 hours. I personally reviewed no new imaging or EKGs.    Physical Exam   Temp: 97.6  F (36.4  C) Temp src: Axillary BP: (!) 81/61 Pulse: 82 Heart Rate: 86 Resp: 22 SpO2: 99 % O2 Device: None (Room air)    Vitals:    05/24/20 0100 05/25/20 0019 05/25/20 0220   Weight: 67.7 kg (149 lb 4 oz) 67.1 kg (147 lb 14.9 oz) 82.6 kg (182 lb 1.6 oz)     Vital Signs with Ranges  Temp:  [97.6  F (36.4  C)-98.6  F (37  C)] 97.6  F (36.4  C)  Pulse:  [73-94] 82  Heart Rate:  [67-98] 86  Resp:  [10-41] 22  BP: ()/() 81/61  SpO2:  [92 %-100 %] 99 %  I/O last 3 completed shifts:  In: 1182.67 [P.O.:55; I.V.:802.67]  Out: 1735 [Urine:1735]    Constitutional: Awake, alert, cooperative, no apparent distress  Respiratory: Clear to auscultation bilaterally, no crackles or wheezing  Cardiovascular: Regular rate and rhythm, normal S1 and S2, and + systolic murmur  GI: Normal bowel sounds, soft, non-distended, non-tender  Skin/Integumen: No rashes, no cyanosis, no edema  Other: Hard of hearing, speech somewhat difficult to understand, but better when he speaks slower.    Medications     [START ON 5/26/2020] lactated ringers       - MEDICATION INSTRUCTIONS -       pantoprazole (PROTONIX) infusion ADULT/PEDS GREATER than or EQUAL to 45 kg 8 mg/hr (05/25/20 1235)     - MEDICATION INSTRUCTIONS -         bisacodyl  20 mg Oral Once     budesonide  0.5 mg Nebulization BID     diphenhydrAMINE  25 mg Oral Once     ipratropium - albuterol 0.5 mg/2.5 mg/3 mL  3 mL Nebulization 4x daily     polyethylene glycol  238 g Oral Once     simvastatin  40 mg Oral At Bedtime       Data   Recent Labs   Lab 05/25/20  1312 05/25/20  0442 05/25/20  0017  05/24/20  1827 05/24/20  1446  05/24/20  0752  05/24/20  0353  05/23/20  2104   WBC  --  3.6*  --   --   --   --   --  4.3  --   --  8.1   HGB 9.5* 7.4* 7.9*   < > 7.9* 7.6*   < > Canceled, Test credited  7.6* 6.5*   < > 6.7*   MCV  --  77*  --   --   --   --   --  73*  --   --  68*   PLT  --  124*  --   --   --   --   --  140*  --   --  228   INR  --   --   --   --   --   --   --   --   --   --  2.18*   NA  --  144  --   --   --   --   --   --  136  --  137   POTASSIUM  --  4.4  --   --   --   --   --   --  4.7  --  4.9   CHLORIDE  --  115*  --   --   --   --   --   --  109  --  106   CO2  --  24  --   --   --   --   --   --  23  --  23   BUN  --  53*  --   --   --   --   --   --  62*  --  61*   CR  --  1.49*  --   --   --   --   --   --  1.45*  --  1.62*   ANIONGAP  --  5  --   --   --   --   --   --  4  --  8   AYALA  --  8.1*  --   --   --   --   --   --  8.5  --  9.3   GLC  --  93  --   --   --   --   --   --  134*  --  189*   ALBUMIN  --   --   --   --   --   --   --   --   --   --  3.3*   PROTTOTAL  --   --   --   --   --   --   --   --   --   --  6.3*   BILITOTAL  --   --   --   --   --   --   --   --   --   --  0.7   ALKPHOS  --   --   --   --   --   --   --   --   --   --  43   ALT  --   --   --   --   --   --   --   --   --   --  23   AST  --   --   --   --   --   --   --   --   --   --  20   TROPI  --  0.247*  --   --  0.367* 0.410*  --  0.435*  --   --   --     < > = values in this interval not displayed.       Recent Results (from the past 24 hour(s))   NM GI Bleed    Narrative    NUCLEAR MEDICINE GI BLEED May 25, 2020 12:33 PM     CLINICAL INFORMATION: GI bleeding.    COMPARISON: CT of the abdomen and pelvis performed 5/23/2020.     TECHNIQUE: The patient's red blood cells were labeled with 27 mCi Tc  99m ultra tagged red blood cells. Injection was into the right arm IV.  Dynamic images were obtained out through 57 minutes.    FINDINGS: On the final several images of this study, a focal area of  radiotracer activity arises in the left midabdomen,  suspicious for a  site of active GI bleeding. No other areas of abnormal focal  radiotracer activity are identified. Physiologic radiotracer activity  is noted within the blood vessels and liver.      Impression    IMPRESSION: A focal area of abnormal radiotracer activity is noted in  the left midabdomen on the final several images of this study, and is  suspicious for active GI bleeding.

## 2020-05-25 NOTE — PLAN OF CARE
Pt is alert and oriented.  at bedside. Denies pain.  AFib. BP stable. VSS.  Nuc med for RBC tag scan. Pt scheduled for colonoscopy tomorrow. Hgb every 6. CTM  No bloody stools or emesis. Okay for clears diet.  Voids per urinal.    Patients son Nicko updated.   Pt to transfer to [facility].

## 2020-05-25 NOTE — PROVIDER NOTIFICATION
Dr. Fournier paged re: pts 0000 Hgb of 7.9 and a conditional order present to transfuse for Hgb less than 8. Pt with no signs of active bleeding, and VSS. Awaiting response. Luda Delacruz RN on 5/25/2020 at 12:55 AM    0100: received order to not transfuse for the 0000 Hgb of 7.9, order already in place for 0400 Hgb check. Luda Delacruz RN on 5/25/2020 at 1:02 AM    0530: Dr. Fournier paged re: Hgb 7.4. Per cardiology's note, pt to have a higher Hgb goal of 8-9. Per MD, release conditional order. Luda Delacruz RN on 5/25/2020 at 5:35 AM    0558: Discussed with Awilda in blood bank, and d/t blood shortage, on call pathologist needs to be paged to ensure appropriateness of transfusion. Per blood bank, page sent to Dr. Chamberlain. Will await response. Luda Delacruz RN on 5/25/2020 at 6:00 AM    0630: blood transfusion started. 15 minute vital sign check stable. Luda Delacruz RN on 5/25/2020 at 7:04 AM

## 2020-05-25 NOTE — PLAN OF CARE
Shift 9590-2648: VSS--some BP's may be affected by pt repositioning on own. 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 Pulm: Lungs: diminished throughout, RA. GI/: BS present, no BM; U/O: WDL, per urinal. Diet: NPO ex. meds. Access: PIV x 2. Denies pain. Protonix gtt, NS for MIVF. Hgb checks Q 4.    New:  - 1 unit PRBC's given x 1  - Interpretor here in person for about 2 hrs this evening--reviewed EGD, plan of care, pt verbalized understanding about care and what's going on.

## 2020-05-25 NOTE — PROGRESS NOTES
Red blood cell tag completed which lit up at around 55 mins tomeka at mid left abdomen. Image review which this is an area overlapping for both small bowel and left colon.     Will plan to do a flip which start with colonoscopy if negative then proceed with push enteroscopy tomorrow.   -c/w clear liquid diet  -bowel prep today (ordered)  -NPO after MN  -family/son/Nicko updated    Isela Mckeon MD (Richard)  Provider's Cell: [phone]     [facility] GI consultant [facility]  [phone]

## 2020-05-25 NOTE — PROGRESS NOTES
[facility]    Cardiology Consultation     Date of Admission:  5/23/2020    Assessment & Plan   Shiraz Ingram is a 85 year old male who was admitted on 5/23/2020. I was asked to see the patient for LA clot on anticoagulation in the setting of a GI bleed.    The patient presented to the emergency department with lightheadedness.  He was found to have a hemoglobin of 6.7 down from a previous hemoglobin 2 weeks ago of 10.1.  There is also concerns of dark-colored stools.  He is currently being treated for GI bleeding.  He was anticoagulated with Pradaxa for the left atrial appendage clot.    Currently the risk versus benefits of anticoagulation are in favor of holding anticoagulation.  GI is consulted and if the patient is able to be cleared from a GI standpoint for anticoagulation this should be resumed when possible.    Patient can continue TAVR work-up as an outpatient.    Severe aortic stenosis  Atrial fibrillation with slow ventricular response  LA clot  Coronary artery disease status post CABG in 2002  Anticoagulation  GI bleeding  Anemia secondary to above  HTN  HLD  Moderate asthma  CKD stage III  Hearing loss    Plan:  Hold anticoagulation (Pradaxa) until cleared by gastroenterology    Please restart aspirin when cleared by gastroenterology.    Treatment of GI bleeding per hospitalist and GI team.  Currently the patient is on a Protonix infusion.    Continue simvastatin    I note that the troponin is slightly elevated I doubt this is related to a type I event.  This is likely demand ischemia from anemia.  Recommend to transfuse the patient to a higher goal hemoglobin of 8-9.    Jorge Main MD     Code Status    DNR/DNI    Physical Exam   Temp: 97.6  F (36.4  C) Temp src: Axillary BP: (!) 117/102 Pulse: 87 Heart Rate: 90 Resp: 14 SpO2: 99 % O2 Device: None (Room air)    Vital Signs with Ranges  Temp:  [97.6  F (36.4  C)-98.6  F (37  C)] 97.6  F (36.4  C)  Pulse:  [73-87] 87  Heart  Rate:  [67-90] 90  Resp:  [10-41] 14  BP: ()/() 117/102  SpO2:  [92 %-100 %] 99 %  182 lbs 1.6 oz    GENERAL APPEARANCE: Alert and oriented x3. No acute distress.  SKIN: Inspection of the skin reveals no rashes, ulcerations, warm, dry  NECK: Supple and symmetric.  normal JVP  LUNGS: Clear breath sounds throughout bilaterally, no wheezes, no rhonchi  CARDIOVASCULAR: S1, S2, regular rate and rhythm with 3/6 systolic murmur RUSB and LUSB   ABDOMEN: Soft, non-tender, non-distended with normal bowel sounds.  No ascites noted.  EXTREMITIES: no edema.  NEUROLOGIC:  Normal mood and affect.  Sensation to touch was normal.      Data   Results for orders placed or performed during the hospital encounter of 05/23/20 (from the past 24 hour(s))   Troponin I (STAT q4h x3)   Result Value Ref Range    Troponin I ES 0.410 (HH) 0.000 - 0.045 ug/L   Hemoglobin   Result Value Ref Range    Hemoglobin 7.6 (L) 13.3 - 17.7 g/dL   Troponin I (STAT q4h x3)   Result Value Ref Range    Troponin I ES 0.367 (HH) 0.000 - 0.045 ug/L   Hemoglobin   Result Value Ref Range    Hemoglobin 7.9 (L) 13.3 - 17.7 g/dL   Glucose by meter   Result Value Ref Range    Glucose 93 70 - 99 mg/dL   Hemoglobin   Result Value Ref Range    Hemoglobin 8.2 (L) 13.3 - 17.7 g/dL   Glucose by meter   Result Value Ref Range    Glucose 98 70 - 99 mg/dL   Hemoglobin   Result Value Ref Range    Hemoglobin 7.9 (L) 13.3 - 17.7 g/dL   Troponin I   Result Value Ref Range    Troponin I ES 0.247 (HH) 0.000 - 0.045 ug/L   CBC with platelets   Result Value Ref Range    WBC 3.6 (L) 4.0 - 11.0 10e9/L    RBC Count 3.06 (L) 4.4 - 5.9 10e12/L    Hemoglobin 7.4 (L) 13.3 - 17.7 g/dL    Hematocrit 23.4 (L) 40.0 - 53.0 %    MCV 77 (L) 78 - 100 fl    MCH 24.2 (L) 26.5 - 33.0 pg    MCHC 31.6 31.5 - 36.5 g/dL    RDW 24.6 (H) 10.0 - 15.0 %    Platelet Count 124 (L) 150 - 450 10e9/L   Basic metabolic panel   Result Value Ref Range    Sodium 144 133 - 144 mmol/L    Potassium 4.4 3.4 - 5.3  mmol/L    Chloride 115 (H) 94 - 109 mmol/L    Carbon Dioxide 24 20 - 32 mmol/L    Anion Gap 5 3 - 14 mmol/L    Glucose 93 70 - 99 mg/dL    Urea Nitrogen 53 (H) 7 - 30 mg/dL    Creatinine 1.49 (H) 0.66 - 1.25 mg/dL    GFR Estimate 42 (L) >60 mL/min/[1.73_m2]    GFR Estimate If Black 49 (L) >60 mL/min/[1.73_m2]    Calcium 8.1 (L) 8.5 - 10.1 mg/dL   Magnesium   Result Value Ref Range    Magnesium 1.9 1.6 - 2.3 mg/dL   Phosphorus   Result Value Ref Range    Phosphorus 2.7 2.5 - 4.5 mg/dL   NM GI Bleed    Narrative    NUCLEAR MEDICINE GI BLEED May 25, 2020 12:33 PM     CLINICAL INFORMATION: GI bleeding.    COMPARISON: CT of the abdomen and pelvis performed 5/23/2020.     TECHNIQUE: The patient's red blood cells were labeled with 27 mCi Tc  99m ultra tagged red blood cells. Injection was into the right arm IV.  Dynamic images were obtained out through 57 minutes.    FINDINGS: On the final several images of this study, a focal area of  radiotracer activity arises in the left midabdomen, suspicious for a  site of active GI bleeding. No other areas of abnormal focal  radiotracer activity are identified. Physiologic radiotracer activity  is noted within the blood vessels and liver.      Impression    IMPRESSION: A focal area of abnormal radiotracer activity is noted in  the left midabdomen on the final several images of this study, and is  suspicious for active GI bleeding.   Hemoglobin   Result Value Ref Range    Hemoglobin 9.5 (L) 13.3 - 17.7 g/dL

## 2020-05-25 NOTE — PROGRESS NOTES
[facility]    Hospitalist Progress Note  Interval History   No overt bleeding overnight but still having no response to transfusion which he received 1 unit but decrease in Hgb.     He is s/p EGD with bleeding ulcer in the esophagus but coffee ground material/melena seen in the 2nd and 3rd portion of the small bowel which may reflect to small bowel bleeding given no blood seen in the stomach. Given current presentation, will recommend red blood cell tag scan to decide the next work up given unlikely colon bleeding.    All other review of systems are negative.    Assessment & Plan   85 year old male w/ multiple recent admission due to electrolytes disturbance w/ complex medical history including CAD, CABG, atrial fibrillation on anticoagulation with Pradaxa, known left atrial appendage clot, severe aortic stenosis upcoming plans for TAVR, stage III CKD, moderate persistent, chronic anemia and thrombocytopenia who presented w/ dizziness and melena, Hgb found to be 6.7 from 8.4 two days ago. He also has lactic acidosis. He is now s/p EGD with esophageal bleeding cauterized but melena/coffee ground seen in the duodenum and currently not responding to transfusion but no overt bleeding.    Problem: GI bleed:  ongoing   -  Recommend red blood cell tag to further delineate next step, either push enteroscopy vs colonoscopy with TI intubation    -  Not recommending CTA at this time given no overt bleeding and pt's underlying CKD   -  Can start clear liquid given no procedure plan for today  -  Serial CBC, c/w IV PPI BID    CPT: 54132    Code Status: DNR/DNI    -Data reviewed today: I reviewed all new labs and imaging results over the last 24 hours.     Physical Exam   Temp: 97.9  F (36.6  C) Temp src: Oral BP: (!) 105/95 Pulse: 76 Heart Rate: 80 Resp: 16 SpO2: 98 % O2 Device: None (Room air)    Vitals:    05/24/20 0100 05/25/20 0019 05/25/20 0220   Weight: 67.7 kg (149 lb 4 oz) 67.1 kg (147 lb 14.9 oz) 82.6  kg (182 lb 1.6 oz)     Vital Signs with Ranges  Temp:  [97.9  F (36.6  C)-98.6  F (37  C)] 97.9  F (36.6  C)  Pulse:  [72-86] 76  Heart Rate:  [67-89] 80  Resp:  [10-41] 16  BP: ()/() 105/95  SpO2:  [92 %-100 %] 98 %  I/O last 3 completed shifts:  In: 1182.67 [P.O.:55; I.V.:802.67]  Out: 1735 [Urine:1735]    Constitutional: Awake, alert, cooperative, no apparent distress  Respiratory: Clear to auscultation bilaterally, no crackles or wheezing  Cardiovascular: Regular rate and rhythm, normal S1 and S2, and no murmur noted  GI: Normal bowel sounds, soft, non-distended, non-tender  Skin/Integumen: No rashes, no cyanosis, no edema  Other:     Isela Mckeon MD  ([facility]  Office: [phone]  Cell: [phone], please feel free to call the cell    Medications     - MEDICATION INSTRUCTIONS -       pantoprazole (PROTONIX) infusion ADULT/PEDS GREATER than or EQUAL to 45 kg 8 mg/hr (05/25/20 0800)     - MEDICATION INSTRUCTIONS -       sodium chloride 40 mL/hr at 05/25/20 0800       budesonide  0.5 mg Nebulization BID     diphenhydrAMINE  25 mg Oral Once     ipratropium - albuterol 0.5 mg/2.5 mg/3 mL  3 mL Nebulization 4x daily     simvastatin  40 mg Oral At Bedtime       Data   Recent Labs   Lab 05/25/20  0442 05/25/20  0017 05/24/20  2111 05/24/20  1827 05/24/20  1446  05/24/20  0752 05/24/20  0353  05/23/20  2104   WBC 3.6*  --   --   --   --   --  4.3  --   --  8.1   HGB 7.4* 7.9* 8.2* 7.9* 7.6*   < > Canceled, Test credited  7.6* 6.5*   < > 6.7*   MCV 77*  --   --   --   --   --  73*  --   --  68*   *  --   --   --   --   --  140*  --   --  228   INR  --   --   --   --   --   --   --   --   --  2.18*     --   --   --   --   --   --  136  --  137   POTASSIUM 4.4  --   --   --   --   --   --  4.7  --  4.9   CHLORIDE 115*  --   --   --   --   --   --  109  --  106   CO2 24  --   --   --   --   --   --  23  --  23   BUN 53*  --   --   --   --   --   --  62*   --  61*   CR 1.49*  --   --   --   --   --   --  1.45*  --  1.62*   ANIONGAP 5  --   --   --   --   --   --  4  --  8   AYALA 8.1*  --   --   --   --   --   --  8.5  --  9.3   GLC 93  --   --   --   --   --   --  134*  --  189*   ALBUMIN  --   --   --   --   --   --   --   --   --  3.3*   PROTTOTAL  --   --   --   --   --   --   --   --   --  6.3*   BILITOTAL  --   --   --   --   --   --   --   --   --  0.7   ALKPHOS  --   --   --   --   --   --   --   --   --  43   ALT  --   --   --   --   --   --   --   --   --  23   AST  --   --   --   --   --   --   --   --   --  20   TROPI 0.247*  --   --  0.367* 0.410*  --  0.435*  --   --   --     < > = values in this interval not displayed.       No results found for this or any previous visit (from the past 24 hour(s)).     Isela Mckeon MD  ([facility]  Office: [phone]  Cell: [phone]

## 2020-05-26 ENCOUNTER — OFFICE VISIT (OUTPATIENT)
Dept: INTERPRETER SERVICES | Facility: CLINIC | Age: 85
End: 2020-05-26
Payer: COMMERCIAL

## 2020-05-26 ENCOUNTER — APPOINTMENT (OUTPATIENT)
Dept: PHYSICAL THERAPY | Facility: CLINIC | Age: 85
DRG: 380 | End: 2020-05-26
Attending: HOSPITALIST
Payer: COMMERCIAL

## 2020-05-26 LAB
ANION GAP SERPL CALCULATED.3IONS-SCNC: 5 MMOL/L (ref 3–14)
BUN SERPL-MCNC: 51 MG/DL (ref 7–30)
CALCIUM SERPL-MCNC: 8 MG/DL (ref 8.5–10.1)
CHLORIDE SERPL-SCNC: 114 MMOL/L (ref 94–109)
CO2 SERPL-SCNC: 22 MMOL/L (ref 20–32)
COLONOSCOPY: NORMAL
CREAT SERPL-MCNC: 1.31 MG/DL (ref 0.66–1.25)
ERYTHROCYTE [DISTWIDTH] IN BLOOD BY AUTOMATED COUNT: 25.5 % (ref 10–15)
GFR SERPL CREATININE-BSD FRML MDRD: 49 ML/MIN/{1.73_M2}
GLUCOSE SERPL-MCNC: 115 MG/DL (ref 70–99)
HCT VFR BLD AUTO: 24.4 % (ref 40–53)
HGB BLD-MCNC: 7.1 G/DL (ref 13.3–17.7)
HGB BLD-MCNC: 7.7 G/DL (ref 13.3–17.7)
HGB BLD-MCNC: 8.2 G/DL (ref 13.3–17.7)
HGB BLD-MCNC: 8.4 G/DL (ref 13.3–17.7)
MCH RBC QN AUTO: 25.2 PG (ref 26.5–33)
MCHC RBC AUTO-ENTMCNC: 31.6 G/DL (ref 31.5–36.5)
MCV RBC AUTO: 80 FL (ref 78–100)
PLATELET # BLD AUTO: 123 10E9/L (ref 150–450)
POTASSIUM SERPL-SCNC: 4.1 MMOL/L (ref 3.4–5.3)
RBC # BLD AUTO: 3.06 10E12/L (ref 4.4–5.9)
SODIUM SERPL-SCNC: 141 MMOL/L (ref 133–144)
WBC # BLD AUTO: 3.5 10E9/L (ref 4–11)

## 2020-05-26 PROCEDURE — 86923 COMPATIBILITY TEST ELECTRIC: CPT | Performed by: HOSPITALIST

## 2020-05-26 PROCEDURE — 80048 BASIC METABOLIC PNL TOTAL CA: CPT | Performed by: HOSPITALIST

## 2020-05-26 PROCEDURE — G0500 MOD SEDAT ENDO SERVICE >5YRS: HCPCS | Performed by: INTERNAL MEDICINE

## 2020-05-26 PROCEDURE — 97116 GAIT TRAINING THERAPY: CPT | Mod: GP

## 2020-05-26 PROCEDURE — 94640 AIRWAY INHALATION TREATMENT: CPT

## 2020-05-26 PROCEDURE — 97161 PT EVAL LOW COMPLEX 20 MIN: CPT | Mod: GP

## 2020-05-26 PROCEDURE — 36415 COLL VENOUS BLD VENIPUNCTURE: CPT | Performed by: HOSPITALIST

## 2020-05-26 PROCEDURE — 25000128 H RX IP 250 OP 636: Performed by: HOSPITALIST

## 2020-05-26 PROCEDURE — 25000125 ZZHC RX 250: Performed by: HOSPITALIST

## 2020-05-26 PROCEDURE — 99232 SBSQ HOSP IP/OBS MODERATE 35: CPT | Performed by: HOSPITALIST

## 2020-05-26 PROCEDURE — 85018 HEMOGLOBIN: CPT | Performed by: HOSPITALIST

## 2020-05-26 PROCEDURE — 94640 AIRWAY INHALATION TREATMENT: CPT | Mod: 76

## 2020-05-26 PROCEDURE — 25000128 H RX IP 250 OP 636: Performed by: INTERNAL MEDICINE

## 2020-05-26 PROCEDURE — 25000132 ZZH RX MED GY IP 250 OP 250 PS 637: Performed by: PHYSICIAN ASSISTANT

## 2020-05-26 PROCEDURE — 40000275 ZZH STATISTIC RCP TIME EA 10 MIN

## 2020-05-26 PROCEDURE — 86850 RBC ANTIBODY SCREEN: CPT | Performed by: HOSPITALIST

## 2020-05-26 PROCEDURE — 97530 THERAPEUTIC ACTIVITIES: CPT | Mod: GP

## 2020-05-26 PROCEDURE — C9113 INJ PANTOPRAZOLE SODIUM, VIA: HCPCS | Performed by: HOSPITALIST

## 2020-05-26 PROCEDURE — 85027 COMPLETE CBC AUTOMATED: CPT | Performed by: HOSPITALIST

## 2020-05-26 PROCEDURE — 45378 DIAGNOSTIC COLONOSCOPY: CPT | Performed by: INTERNAL MEDICINE

## 2020-05-26 PROCEDURE — 25800030 ZZH RX IP 258 OP 636: Performed by: HOSPITALIST

## 2020-05-26 PROCEDURE — 86901 BLOOD TYPING SEROLOGIC RH(D): CPT | Performed by: HOSPITALIST

## 2020-05-26 PROCEDURE — 0DJD8ZZ INSPECTION OF LOWER INTESTINAL TRACT, VIA NATURAL OR ARTIFICIAL OPENING ENDOSCOPIC: ICD-10-PCS | Performed by: INTERNAL MEDICINE

## 2020-05-26 PROCEDURE — T1013 SIGN LANG/ORAL INTERPRETER: HCPCS | Mod: U3

## 2020-05-26 PROCEDURE — 25000132 ZZH RX MED GY IP 250 OP 250 PS 637: Performed by: HOSPITALIST

## 2020-05-26 PROCEDURE — 12000047 ZZH R&B IMCU

## 2020-05-26 PROCEDURE — 86900 BLOOD TYPING SEROLOGIC ABO: CPT | Performed by: HOSPITALIST

## 2020-05-26 RX ORDER — LIDOCAINE 40 MG/G
CREAM TOPICAL
Status: DISCONTINUED | OUTPATIENT
Start: 2020-05-26 | End: 2020-05-26

## 2020-05-26 RX ORDER — SUCRALFATE ORAL 1 G/10ML
1 SUSPENSION ORAL
Status: DISCONTINUED | OUTPATIENT
Start: 2020-05-26 | End: 2020-05-30 | Stop reason: HOSPADM

## 2020-05-26 RX ORDER — NALOXONE HYDROCHLORIDE 0.4 MG/ML
.1-.4 INJECTION, SOLUTION INTRAMUSCULAR; INTRAVENOUS; SUBCUTANEOUS
Status: ACTIVE | OUTPATIENT
Start: 2020-05-26 | End: 2020-05-27

## 2020-05-26 RX ORDER — FLUMAZENIL 0.1 MG/ML
0.2 INJECTION, SOLUTION INTRAVENOUS
Status: ACTIVE | OUTPATIENT
Start: 2020-05-26 | End: 2020-05-27

## 2020-05-26 RX ORDER — MAGNESIUM CARB/ALUMINUM HYDROX 105-160MG
296 TABLET,CHEWABLE ORAL ONCE
Status: COMPLETED | OUTPATIENT
Start: 2020-05-26 | End: 2020-05-26

## 2020-05-26 RX ORDER — FENTANYL CITRATE 50 UG/ML
INJECTION, SOLUTION INTRAMUSCULAR; INTRAVENOUS PRN
Status: DISCONTINUED | OUTPATIENT
Start: 2020-05-26 | End: 2020-05-26 | Stop reason: HOSPADM

## 2020-05-26 RX ADMIN — IPRATROPIUM BROMIDE AND ALBUTEROL SULFATE 3 ML: .5; 3 SOLUTION RESPIRATORY (INHALATION) at 19:38

## 2020-05-26 RX ADMIN — IPRATROPIUM BROMIDE AND ALBUTEROL SULFATE 3 ML: .5; 3 SOLUTION RESPIRATORY (INHALATION) at 07:48

## 2020-05-26 RX ADMIN — IPRATROPIUM BROMIDE AND ALBUTEROL SULFATE 3 ML: .5; 3 SOLUTION RESPIRATORY (INHALATION) at 12:06

## 2020-05-26 RX ADMIN — BUDESONIDE 0.5 MG: 0.5 INHALANT RESPIRATORY (INHALATION) at 07:48

## 2020-05-26 RX ADMIN — PANTOPRAZOLE SODIUM 40 MG: 40 INJECTION, POWDER, FOR SOLUTION INTRAVENOUS at 20:11

## 2020-05-26 RX ADMIN — SUCRALFATE 1 G: 1 SUSPENSION ORAL at 21:11

## 2020-05-26 RX ADMIN — BUDESONIDE 0.25 MG: 0.5 INHALANT RESPIRATORY (INHALATION) at 19:38

## 2020-05-26 RX ADMIN — SIMVASTATIN 40 MG: 40 TABLET, FILM COATED ORAL at 21:11

## 2020-05-26 RX ADMIN — SUCRALFATE 1 G: 1 SUSPENSION ORAL at 18:26

## 2020-05-26 RX ADMIN — SODIUM CHLORIDE 8 MG/HR: 9 INJECTION, SOLUTION INTRAVENOUS at 10:35

## 2020-05-26 RX ADMIN — MAGNESIUM CITRATE 296 ML: 1.75 LIQUID ORAL at 10:56

## 2020-05-26 ASSESSMENT — ACTIVITIES OF DAILY LIVING (ADL)
ADLS_ACUITY_SCORE: 16

## 2020-05-26 NOTE — PLAN OF CARE
Pt is A&Ox4, VSS, on RA, denies pain. Pt is deaf; uses  also able to communicate by writing. Pt had emesis x2 this shift, PRN zofran and compazine given x1. Bowel prep completed. Protonix gtt running at 8mg/hr. LS dim, BS+ active, flatus+, BMx5 this shift- loose, watery, black/dark brown. Pt is incontinent of stool at times. Pt is assist x1-2, hemoglobin checks q6h, most recent Hgb=7.7. Pt has been NPO since midnight; plan for colonoscopy sometime today. Tele: A cl CVR.

## 2020-05-26 NOTE — PLAN OF CARE
Discharge Planner PT   Patient plan for discharge: Not stated  Current status: Evaluation completed and treatment initiated. Patient admitted on 5/23/2020 for GI bleed. Patient lives in a house with his 2 sons, 3 steps to enter, ind at baseline.      present for session. VSS throughout session. Patient completing supine<>sit SBA, sit<>stand SBA with IV pole for support, and gait x 80 feet in room with IV pole and SBA. Slow ambulation with some rest breaks needed 2/2 to fatigue. Patient in supine at end of session, all needs in reach and alarm on.   Barriers to return to prior living situation: Decreased tolerance to activity, stairs- not yet assessed  Recommendations for discharge: Home with A from sons for household tasks and stair navigation  Rationale for recommendations: Patient mobilizing well this date, limited mostly by fatigue. Anticipate with further skilled intervention patient will progress to level of independence for safe discharge home with A from sons for household tasks and stair navgiation.        Entered by: Ileana Gupta 05/26/2020 10:16 AM

## 2020-05-26 NOTE — CONSULTS
Cardiology Brief Note:    Please see consult dated 5/24.     Agree with plan as outlined.     EGD noted: Treatment of ulceration found was not successful. Blood in the second portion of the duodenum and in the third portion of the duodenum. However no discrete lesion was found. It may or may not be related to the esophageal bleeding and could be a 2nd source. Additional work up per GI is pending. Anticoagulation continues to be held per their recommendations.     Resume pradaxa when able given exceedingly high risk of stroke in presence of possible Left atrial thrombus and dense spontaneous echo contrast.     Will sign off.     Jess Heck MD MSc  Staff Cardiologist

## 2020-05-26 NOTE — PROGRESS NOTES
05/26/20 1000   Quick Adds   Type of Visit Initial PT Evaluation   Living Environment   Lives With child(perla), adult   Living Arrangements house   Home Accessibility stairs to enter home   Number of Stairs, Main Entrance 3   Self-Care   Usual Activity Tolerance good   Current Activity Tolerance moderate   Equipment Currently Used at Home none   Functional Level Prior   Ambulation 0-->independent   Transferring 0-->independent   Toileting 0-->independent   Bathing 0-->independent   Fall history within last six months no   Which of the above functional risks had a recent onset or change? ambulation;transferring   General Information   Onset of Illness/Injury or Date of Surgery - Date 05/23/20   Referring Physician Roselia Parson,     Patient/Family Goals Statement Not stated   Pertinent History of Current Problem (include personal factors and/or comorbidities that impact the POC) Patient admitted on 5/23/2020 for lightheadedness and melena, found to have GI bleed. Patient with PMH of AD, CABG, atrial fibrillation on anticoagulation with Pradaxa, known left atrial appendage clot, severe aortic stenosis upcoming plans for TAVR, stage III CKD, moderate persistent, chronic anemia and thrombocytopenia.    Precautions/Limitations fall precautions   Weight-Bearing Status - LLE full weight-bearing   Weight-Bearing Status - RLE full weight-bearing   General Observations Patient supine in bed upon arrival of therapist, agreeable to working with PT   Cognitive Status Examination   Orientation orientation to person, place and time   Level of Consciousness alert   Follows Commands and Answers Questions 100% of the time;able to follow multistep instructions   Pain Assessment   Patient Currently in Pain No   Integumentary/Edema   Integumentary/Edema no deficits were identifed   Posture    Posture Not impaired   Range of Motion (ROM)   ROM Comment B LE ROM WNL    Strength   Strength Comments Not formally assessed but  "at least 3+/5 with functional transfers and gait   Bed Mobility   Bed Mobility Comments Supine>sit ind   Transfer Skills   Transfer Comments Sit>stand from EOB with no AD and SBA, IV pole for support    Gait   Gait Comments Gait x 10 feet with IV pole for support and SBA, slow gait   Balance   Balance no deficits were identified   General Therapy Interventions   Planned Therapy Interventions gait training;stretching;transfer training;bed mobility training   Clinical Impression   Criteria for Skilled Therapeutic Intervention yes, treatment indicated   PT Diagnosis Impaired mobility and gait   Influenced by the following impairments Decreased tolerance to activity, weakness   Functional limitations due to impairments transfers, gait, stairs   Clinical Presentation Evolving/Changing   Clinical Presentation Rationale Based on PMH, current presentation, and social support    Clinical Decision Making (Complexity) Low complexity   Therapy Frequency 5x/week   Predicted Duration of Therapy Intervention (days/wks) 3 days   Anticipated Discharge Disposition Home with Assist   Risk & Benefits of therapy have been explained Yes   Patient, Family & other staff in agreement with plan of care Yes   [facility] [employer] \"6 Clicks\"   2016, Trustees of [facility], under license to [employer].  All rights reserved.   6 Clicks Short Forms Basic Mobility Inpatient Short Form   [facility] AM-PAC  \"6 Clicks\" V.2 Basic Mobility Inpatient Short Form   1. Turning from your back to your side while in a flat bed without using bedrails? 4 - None   2. Moving from lying on your back to sitting on the side of a flat bed without using bedrails? 4 - None   3. Moving to and from a bed to a chair (including a wheelchair)? 3 - A Little   4. Standing up from a chair using your arms (e.g., wheelchair, or bedside chair)? 3 - A Little   5. To walk in hospital room? 3 - A Little   6. Climbing 3-5 steps with a railing? 3 - A Little "   Basic Mobility Raw Score (Score out of 24.Lower scores equate to lower levels of function) 20   Total Evaluation Time   Total Evaluation Time (Minutes) 6

## 2020-05-26 NOTE — PROGRESS NOTES
[facility]    Hospitalist Progress Note      Assessment & Plan   Shiraz Ingram is a 85 year old male who was admitted on 5/23/2020 with lightheadedness and melena, found to have acute GI bleed requiring blood transfusion.    Acute GI bleed, bleeding esophageal ulcer along with likely additional bleed in small vs large intestine  Acute blood loss anemia  Chronic anemia  Lightheaded for for a few days PTA, developed melena on the day of admission.  Nauseous with some dry heaves but no emesis. Hemoglobin 6.7, down from 8.4 two days prior to admission and baseline of around 11 earlier this month. Hemoccult positive in the ER. Hemodynamically stable in the ER, however lactic acid was elevated to 6.0.  EGD 5/24 found bleeding esophageal ulcer treated with cautery and APC after failed injection. RBC tagged study confirms left mid-abdomen bleed on 5/25.  - IMC monitoring until AM--if hemodynamically stable can be discontinued  - GI consulted, recommendations appreciated  - Transfused 4 units PRBCs since admission  - Protonix BID  - 5/26 colonoscopy negative and no hints of bleeding from above  - Pradaxa on hold, will need to determine when safe to resume with GI's help  - Discontinue IVF, plan for full liquids.  - Goal Hgb 8-9 considering cardiac history and elevated troponin  - Conditional orders entered for hemoglobin <8 to transfuse 1 unit PRBCs--okay to hold blood transfusion if hgb>7.5 and monitor on repeat hgb check  - hgb check at 2200 and in AM  - PT recommends home with assist of son on discharge     COVID-19 testing--negative  -Asymptomatic COVID-19 testing obtained in anticipation of endoscopy (negative)      Coronary artery disease status post CABG in the early 2000's  NSTEMI, type 2 secondary to demand  Asymptomatic.  -Troponin 0 0.035 two days prior to admission, trop 0.435--0.410--0.367--0.247 on admit. Likely all in setting of demand with acute GI bleed, no chest pain noted  - Hold PTA  aspirin    - Resume simvastatin     Severe aortic stenosis  Followed by cardiology, has been undergoing TAVR work-up with plans for TAVR in the next month or 2.  - Cardiology consult appreciated     Atrial fibrillation  -Not on any chronic rate or rhythm control medications.  Heart rate in the 80s in the ER.  -Chronically anticoagulated with Pradaxa, hold for now in setting of GI bleed.     Left atrial appendage thrombosis  -Diagnosed during work-up for TAVR earlier this year in February.  -Treated with Pradaxa.  -He had a echocardiogram in April 2020 which did not show any clear-cut thrombus, but in some views did show an early/P clot appearance.  -Hold Pradaxa for now as noted above.     Chronic diastolic congestive heart failure  -Not on a diuretic at baseline. Echo as above with EF 50-55% with moderate concentric LVH, moderate decreased RVSF  -Monitor volume status closely as he will be getting blood and IV fluids in the setting of a GI bleed.     Hypertension  -PTA not on any medications for BP control, lower BPs in setting of GI bleed  - Monitor     Hyperlipidemia  - Resume PTA statin     Moderate persistent asthma  -Currently asymptomatic.  -Continue PTA duo nebs QID and Pulmicort BID     Chronic kidney disease stage III  -Baseline creatinine is in the 1.4-1.7 range.   -Cr 1.62 on admit, trended to 1.31 overnight  - BMP in AM     Senile hearing loss  Uses ASL to communicate, plans for one at bedside as noted in nursing note  -  available 3x throughout day    DVT Prophylaxis: Pneumatic Compression Devices  Code Status: DNR/DNI  Expected discharge: 1-2 days, pending resolution of bleeding and stabilization of hgb.  Transition off IMC status in AM if hemodynamically stable and no hgb drop.    Roselia Parson, DO  Text Page (7am - 6pm)    Interval History   Patient seen and examined. Feels okay. Had some emesis overnight with bowel prep. Feels like he is cleaned out. BMs dark. No lightheadedness or  dizziness.  Discussed plan of care with bedside nurse and Dr Fournier    -Data reviewed today: I reviewed all new labs and imaging results over the last 24 hours. I personally reviewed no new imaging or EKGs.    Physical Exam   Temp: 98.2  F (36.8  C) Temp src: Oral BP: (!) 146/81 Pulse: 89 Heart Rate: 81 Resp: 12 SpO2: 97 % O2 Device: None (Room air)    Vitals:    05/24/20 0100 05/25/20 0019 05/25/20 0220   Weight: 67.7 kg (149 lb 4 oz) 67.1 kg (147 lb 14.9 oz) 82.6 kg (182 lb 1.6 oz)     Vital Signs with Ranges  Temp:  [97.6  F (36.4  C)-99  F (37.2  C)] 98.2  F (36.8  C)  Pulse:  [73-94] 89  Heart Rate:  [78-98] 81  Resp:  [12-28] 12  BP: ()/() 146/81  SpO2:  [95 %-99 %] 97 %  I/O last 3 completed shifts:  In: 1140 [P.O.:300; I.V.:540]  Out: 1625 [Urine:1425; Emesis/NG output:200]    Constitutional: Awake, alert, cooperative, no apparent distress  Respiratory: Clear to auscultation bilaterally, no crackles or wheezing  Cardiovascular: Regular rate and rhythm, normal S1 and S2, and + systolic murmur  GI: Normal bowel sounds, soft, non-distended, non-tender  Skin/Integumen: No rashes, no cyanosis, no edema  Other:     Medications     lactated ringers 40 mL/hr at 05/25/20 2340     - MEDICATION INSTRUCTIONS -       pantoprazole (PROTONIX) infusion ADULT/PEDS GREATER than or EQUAL to 45 kg 8 mg/hr (05/26/20 1035)     - MEDICATION INSTRUCTIONS -         budesonide  0.5 mg Nebulization BID     diphenhydrAMINE  25 mg Oral Once     ipratropium - albuterol 0.5 mg/2.5 mg/3 mL  3 mL Nebulization 4x daily     magnesium citrate  296 mL Oral Once     simvastatin  40 mg Oral At Bedtime     sodium chloride (PF)  3 mL Intracatheter Q8H       Data   Recent Labs   Lab 05/26/20  0605 05/26/20  0023 05/25/20  1848  05/25/20  0442  05/24/20  1827 05/24/20  1446  05/24/20  0752 05/24/20  0353  05/23/20  2104   WBC 3.5*  --   --   --  3.6*  --   --   --   --  4.3  --   --  8.1   HGB 7.7* 8.4* 8.3*   < > 7.4*   < > 7.9* 7.6*   <  > Canceled, Test credited  7.6* 6.5*   < > 6.7*   MCV 80  --   --   --  77*  --   --   --   --  73*  --   --  68*   *  --   --   --  124*  --   --   --   --  140*  --   --  228   INR  --   --   --   --   --   --   --   --   --   --   --   --  2.18*     --   --   --  144  --   --   --   --   --  136  --  137   POTASSIUM 4.1  --   --   --  4.4  --   --   --   --   --  4.7  --  4.9   CHLORIDE 114*  --   --   --  115*  --   --   --   --   --  109  --  106   CO2 22  --   --   --  24  --   --   --   --   --  23  --  23   BUN 51*  --   --   --  53*  --   --   --   --   --  62*  --  61*   CR 1.31*  --   --   --  1.49*  --   --   --   --   --  1.45*  --  1.62*   ANIONGAP 5  --   --   --  5  --   --   --   --   --  4  --  8   AYALA 8.0*  --   --   --  8.1*  --   --   --   --   --  8.5  --  9.3   *  --   --   --  93  --   --   --   --   --  134*  --  189*   ALBUMIN  --   --   --   --   --   --   --   --   --   --   --   --  3.3*   PROTTOTAL  --   --   --   --   --   --   --   --   --   --   --   --  6.3*   BILITOTAL  --   --   --   --   --   --   --   --   --   --   --   --  0.7   ALKPHOS  --   --   --   --   --   --   --   --   --   --   --   --  43   ALT  --   --   --   --   --   --   --   --   --   --   --   --  23   AST  --   --   --   --   --   --   --   --   --   --   --   --  20   TROPI  --   --   --   --  0.247*  --  0.367* 0.410*  --  0.435*  --   --   --     < > = values in this interval not displayed.       Recent Results (from the past 24 hour(s))   NM GI Bleed    Narrative    NUCLEAR MEDICINE GI BLEED May 25, 2020 12:33 PM     CLINICAL INFORMATION: GI bleeding.    COMPARISON: CT of the abdomen and pelvis performed 5/23/2020.     TECHNIQUE: The patient's red blood cells were labeled with 27 mCi Tc  99m ultra tagged red blood cells. Injection was into the right arm IV.  Dynamic images were obtained out through 57 minutes.    FINDINGS: On the final several images of this study, a focal area  of  radiotracer activity arises in the left midabdomen, suspicious for a  site of active GI bleeding. No other areas of abnormal focal  radiotracer activity are identified. Physiologic radiotracer activity  is noted within the blood vessels and liver.      Impression    IMPRESSION: A focal area of abnormal radiotracer activity is noted in  the left midabdomen on the final several images of this study, and is  suspicious for active GI bleeding.    CASH RICO MD

## 2020-05-26 NOTE — PLAN OF CARE
A/o x4. AVSS on RA. Denies pain. BS active, +flatus, +BM (soft black stools). Voiding. Full Liquid diet. Up A1 w/ gb. CMS intact.  service used. Tele: Afib w/CVR. Tolerated procedure well. Will CTM

## 2020-05-26 NOTE — PLAN OF CARE
Transferred from ICU at 1530. A&O x4. VSS on room air. Tele a-fib CVR w/BBB. CMS intact. Lungs diminished. BS+, BM-, flatus+. Skin ecchymotic. NPO w/meds. Denies N/V. Voiding adequately. Denies pain. Up w/1. Protonix drip infusing. To start bowel prep for colonoscopy tomorrow, no time frame yet. Hgb recheck at 1848 is 8.3.

## 2020-05-26 NOTE — PROGRESS NOTES
[facility]  Gastroenterology Progress Note     Shiraz Ingram MRN# [id number]   YOB: 1934 Age: 85 year old          Assessment and Plan:   Patient has prepped for a colonoscopy and noted melenotic stool with prep. Still has formed stools. Hemoglobin has continued to decrease ( 8.4->7.7). EGD done with bleeding ulcer in the esophagus but coffee ground material/melena seen in the 2nd and 3rd portion of the small bowel which may reflect to small bowel bleeding given no blood seen in the stomach.    Tagged red blood cell scan  lit up at around 55 mins tomeka at mid left abdomen. Concern for left colon vs small bowel bleed.     GI bleed  Shiraz Ingram is a 85 year old male with multiple recent admission due to electrolytes disturbance w/ complex medical history including CAD, CABG, atrial fibrillation on anticoagulation with Pradaxa, known left atrial appendage clot, severe aortic stenosis upcoming plans for TAVR, stage III CKD, moderate persistent, chronic anemia and thrombocytopenia who presented w/ dizziness and melena.  EGD with esophageal bleeding cauterized with melenotic coffee ground seen in duodenum and not responding to transfusion.  Tagged red blood cell suggests left sided GI bleed.  - Plan colonoscopy today followed by possible push enteroscopy  - Order magnesium citrate  - Serial CBC   - Continue IV PPI BID  - Keep NPO until post procedure  - Transfuse for hemoglobin of 7.0 or less.         Interval History:   no new complaints, doing well and doing well; no cp, sob, n/v/d, or abd pain.              Review of Systems:   C: NEGATIVE for fever, chills, change in weight  E/M: NEGATIVE for ear, mouth and throat problems  R: NEGATIVE for significant cough or SOB  CV: NEGATIVE for chest pain, palpitations or peripheral edema             Medications:   I have reviewed this patient's current medications    budesonide  0.5 mg Nebulization BID     diphenhydrAMINE  25 mg Oral Once      ipratropium - albuterol 0.5 mg/2.5 mg/3 mL  3 mL Nebulization 4x daily     simvastatin  40 mg Oral At Bedtime     sodium chloride (PF)  3 mL Intracatheter Q8H                  Physical Exam:   Vitals were reviewed  Vital Signs with Ranges  Temp:  [97.6  F (36.4  C)-99  F (37.2  C)] 98.2  F (36.8  C)  Pulse:  [73-94] 80  Heart Rate:  [78-98] 81  Resp:  [12-28] 12  BP: ()/() 100/52  SpO2:  [95 %-99 %] 97 %  I/O last 3 completed shifts:  In: 1140 [P.O.:300; I.V.:540]  Out: 1625 [Urine:1425; Emesis/NG output:200]  Constitutional: healthy, alert and no distress   Cardiovascular: negative, PMI normal. No lifts, heaves, or thrills. RRR. No murmurs, clicks gallops or rub  Respiratory: negative, Percussion normal. Good diaphragmatic excursion. Lungs clear  Head: Normocephalic. No masses, lesions, tenderness or abnormalities  Neck: Neck supple. No adenopathy. Thyroid symmetric, normal size,, Carotids without bruits.  Abdomen: Abdomen soft, non-tender. BS normal. No masses, organomegaly           Data:   I reviewed the patient's new clinical lab test results.   Recent Labs   Lab Test 05/26/20  0605 05/26/20  0023 05/25/20  1848  05/25/20  0442  05/24/20  0752  05/23/20  2104  04/24/20  0617 04/23/20  0325   WBC 3.5*  --   --   --  3.6*  --  4.3  --  8.1   < > 5.7 5.0   HGB 7.7* 8.4* 8.3*   < > 7.4*   < > Canceled, Test credited  7.6*   < > 6.7*   < > 11.3* 12.3*   MCV 80  --   --   --  77*  --  73*  --  68*   < > 66* 66*   *  --   --   --  124*  --  140*  --  228   < > 104* 103*   INR  --   --   --   --   --   --   --   --  2.18*  --  1.24* 2.35*    < > = values in this interval not displayed.     Recent Labs   Lab Test 05/26/20  0605 05/25/20  0442 05/24/20  0353   POTASSIUM 4.1 4.4 4.7   CHLORIDE 114* 115* 109   CO2 22 24 23   BUN 51* 53* 62*   ANIONGAP 5 5 4     Recent Labs   Lab Test 05/23/20  2104 05/14/20  0547 05/12/20  1605 04/23/20  0325  01/31/17  2110  02/19/13  2248 02/19/13 2115   ALBUMIN 3.3*  2.9*  --  3.8   < >  --    < >  --  3.4   BILITOTAL 0.7 0.5  --  1.0   < >  --    < >  --  0.8   ALT 23 21  --  32   < >  --    < >  --  55   AST 20 19  --  27   < >  --    < >  --  85*   PROTEIN  --   --  Negative  --   --  Negative  --  30*  --    LIPASE  --   --   --   --   --   --   --   --  125    < > = values in this interval not displayed.       I reviewed the patient's new imaging results.    All laboratory data reviewed  All imaging studies reviewed by me.    Tabatha Haines PA-C,  5/26/2020  [facility]  Office : [phone]  Cell: [phone] (Dr. Fournier)  Cell: [phone] (Tabatha Haines PA-C)

## 2020-05-27 ENCOUNTER — APPOINTMENT (OUTPATIENT)
Dept: PHYSICAL THERAPY | Facility: CLINIC | Age: 85
DRG: 380 | End: 2020-05-27
Payer: COMMERCIAL

## 2020-05-27 ENCOUNTER — OFFICE VISIT (OUTPATIENT)
Dept: INTERPRETER SERVICES | Facility: CLINIC | Age: 85
End: 2020-05-27

## 2020-05-27 ENCOUNTER — CARE COORDINATION (OUTPATIENT)
Dept: CARDIOLOGY | Facility: CLINIC | Age: 85
End: 2020-05-27

## 2020-05-27 ENCOUNTER — OFFICE VISIT (OUTPATIENT)
Dept: INTERPRETER SERVICES | Facility: CLINIC | Age: 85
End: 2020-05-27
Payer: COMMERCIAL

## 2020-05-27 LAB
ANION GAP SERPL CALCULATED.3IONS-SCNC: 5 MMOL/L (ref 3–14)
BLD PROD TYP BPU: NORMAL
BLD UNIT ID BPU: 0
BLOOD PRODUCT CODE: NORMAL
BPU ID: NORMAL
BUN SERPL-MCNC: 35 MG/DL (ref 7–30)
CALCIUM SERPL-MCNC: 8.1 MG/DL (ref 8.5–10.1)
CHLORIDE SERPL-SCNC: 112 MMOL/L (ref 94–109)
CO2 SERPL-SCNC: 23 MMOL/L (ref 20–32)
CREAT SERPL-MCNC: 1.45 MG/DL (ref 0.66–1.25)
ERYTHROCYTE [DISTWIDTH] IN BLOOD BY AUTOMATED COUNT: 24.5 % (ref 10–15)
GFR SERPL CREATININE-BSD FRML MDRD: 43 ML/MIN/{1.73_M2}
GLUCOSE SERPL-MCNC: 107 MG/DL (ref 70–99)
HCT VFR BLD AUTO: 26.4 % (ref 40–53)
HGB BLD-MCNC: 7.8 G/DL (ref 13.3–17.7)
HGB BLD-MCNC: 8 G/DL (ref 13.3–17.7)
HGB BLD-MCNC: 8.2 G/DL (ref 13.3–17.7)
MCH RBC QN AUTO: 25.3 PG (ref 26.5–33)
MCHC RBC AUTO-ENTMCNC: 31.1 G/DL (ref 31.5–36.5)
MCV RBC AUTO: 82 FL (ref 78–100)
PLATELET # BLD AUTO: 118 10E9/L (ref 150–450)
POTASSIUM SERPL-SCNC: 4.2 MMOL/L (ref 3.4–5.3)
RBC # BLD AUTO: 3.24 10E12/L (ref 4.4–5.9)
SODIUM SERPL-SCNC: 140 MMOL/L (ref 133–144)
TRANSFUSION STATUS PATIENT QL: NORMAL
TRANSFUSION STATUS PATIENT QL: NORMAL
WBC # BLD AUTO: 3.2 10E9/L (ref 4–11)

## 2020-05-27 PROCEDURE — 25000125 ZZHC RX 250: Performed by: HOSPITALIST

## 2020-05-27 PROCEDURE — T1013 SIGN LANG/ORAL INTERPRETER: HCPCS | Mod: U3

## 2020-05-27 PROCEDURE — 85027 COMPLETE CBC AUTOMATED: CPT | Performed by: HOSPITALIST

## 2020-05-27 PROCEDURE — 94640 AIRWAY INHALATION TREATMENT: CPT | Mod: 76

## 2020-05-27 PROCEDURE — 40000275 ZZH STATISTIC RCP TIME EA 10 MIN

## 2020-05-27 PROCEDURE — 12000047 ZZH R&B IMCU

## 2020-05-27 PROCEDURE — 36415 COLL VENOUS BLD VENIPUNCTURE: CPT | Performed by: INTERNAL MEDICINE

## 2020-05-27 PROCEDURE — 25000132 ZZH RX MED GY IP 250 OP 250 PS 637: Performed by: HOSPITALIST

## 2020-05-27 PROCEDURE — 94640 AIRWAY INHALATION TREATMENT: CPT

## 2020-05-27 PROCEDURE — 25000132 ZZH RX MED GY IP 250 OP 250 PS 637: Performed by: PHYSICIAN ASSISTANT

## 2020-05-27 PROCEDURE — 36415 COLL VENOUS BLD VENIPUNCTURE: CPT | Performed by: HOSPITALIST

## 2020-05-27 PROCEDURE — 85018 HEMOGLOBIN: CPT | Performed by: INTERNAL MEDICINE

## 2020-05-27 PROCEDURE — 80048 BASIC METABOLIC PNL TOTAL CA: CPT | Performed by: HOSPITALIST

## 2020-05-27 PROCEDURE — 97530 THERAPEUTIC ACTIVITIES: CPT | Mod: GP | Performed by: PHYSICAL THERAPIST

## 2020-05-27 PROCEDURE — 99233 SBSQ HOSP IP/OBS HIGH 50: CPT | Performed by: INTERNAL MEDICINE

## 2020-05-27 PROCEDURE — 25000132 ZZH RX MED GY IP 250 OP 250 PS 637: Performed by: INTERNAL MEDICINE

## 2020-05-27 PROCEDURE — P9016 RBC LEUKOCYTES REDUCED: HCPCS | Performed by: HOSPITALIST

## 2020-05-27 RX ORDER — PANTOPRAZOLE SODIUM 40 MG/1
40 TABLET, DELAYED RELEASE ORAL
Status: DISCONTINUED | OUTPATIENT
Start: 2020-05-27 | End: 2020-05-30 | Stop reason: HOSPADM

## 2020-05-27 RX ORDER — DABIGATRAN ETEXILATE 150 MG/1
150 CAPSULE ORAL 2 TIMES DAILY
Status: DISCONTINUED | OUTPATIENT
Start: 2020-05-27 | End: 2020-05-30 | Stop reason: HOSPADM

## 2020-05-27 RX ADMIN — IPRATROPIUM BROMIDE AND ALBUTEROL SULFATE 3 ML: .5; 3 SOLUTION RESPIRATORY (INHALATION) at 11:38

## 2020-05-27 RX ADMIN — SUCRALFATE 1 G: 1 SUSPENSION ORAL at 21:04

## 2020-05-27 RX ADMIN — SIMVASTATIN 40 MG: 40 TABLET, FILM COATED ORAL at 21:04

## 2020-05-27 RX ADMIN — IPRATROPIUM BROMIDE AND ALBUTEROL SULFATE 3 ML: .5; 3 SOLUTION RESPIRATORY (INHALATION) at 20:18

## 2020-05-27 RX ADMIN — IPRATROPIUM BROMIDE AND ALBUTEROL SULFATE 3 ML: .5; 3 SOLUTION RESPIRATORY (INHALATION) at 08:11

## 2020-05-27 RX ADMIN — SUCRALFATE 1 G: 1 SUSPENSION ORAL at 11:24

## 2020-05-27 RX ADMIN — DABIGATRAN ETEXILATE MESYLATE 150 MG: 150 CAPSULE ORAL at 21:04

## 2020-05-27 RX ADMIN — BUDESONIDE 0.5 MG: 0.5 INHALANT RESPIRATORY (INHALATION) at 20:18

## 2020-05-27 RX ADMIN — PANTOPRAZOLE SODIUM 40 MG: 40 TABLET, DELAYED RELEASE ORAL at 17:24

## 2020-05-27 RX ADMIN — BUDESONIDE 0.5 MG: 0.5 INHALANT RESPIRATORY (INHALATION) at 08:10

## 2020-05-27 RX ADMIN — PANTOPRAZOLE SODIUM 40 MG: 40 TABLET, DELAYED RELEASE ORAL at 11:24

## 2020-05-27 RX ADMIN — SUCRALFATE 1 G: 1 SUSPENSION ORAL at 17:24

## 2020-05-27 RX ADMIN — IPRATROPIUM BROMIDE AND ALBUTEROL SULFATE 3 ML: .5; 3 SOLUTION RESPIRATORY (INHALATION) at 15:43

## 2020-05-27 ASSESSMENT — ACTIVITIES OF DAILY LIVING (ADL)
ADLS_ACUITY_SCORE: 16

## 2020-05-27 NOTE — PROGRESS NOTES
[facility]    Medicine Progress Note - Hospitalist Service       Date of Admission:  5/23/2020  Assessment & Plan   Shiraz Ingram is a 85 year old male with complex past medical history including critical aortic stenosis with upcoming plan for TAVR, coronary artery disease with prior bypass, atrial fibrillation with left atrial thrombus, senile hearing loss, chronic anemia, chronic diastolic congestive heart failure, hypertension, hyperlipidemia, moderate persistent asthma, chronic kidney disease stage III who was admitted on 5/23/2020 with lightheadedness and melena, found to have acute GI bleed requiring blood transfusion.     Acute GI bleed, bleeding esophageal ulcer along with likely additional bleed in small vs large intestine  Acute blood loss anemia  Chronic anemia  Lightheaded for for a few days PTA, developed melena on the day of admission. Hemoglobin 6.7, down from 8.4 two days prior to admission and baseline of around 11 earlier this month. Hemoccult positive in the ER. Hemodynamically stable in the ER, however lactic acid was elevated to 6.0.  EGD 5/24 found bleeding esophageal ulcer treated with cautery and APC after failed injection. RBC tagged study confirms left mid-abdomen bleed on 5/25.  - Discontinue IMC status  - GI ([place]) consulted, following  - Changed to oral PPI per gastroenterology  - Conditional transfusion orders for hgb <7.5 g/dl  - Plan to resume dabigatran 5/27 PM as below  - Continue hemoglobin q8H  - PT recommends home with assist of son on discharge     COVID-19 ruled out  Asymptomatic COVID-19 testing obtained in anticipation of endoscopy negative.      Coronary artery disease status post CABG in the early 2000's  NSTEMI, type 2 secondary to demand  Asymptomatic. Troponin trend 0.435--0.410--0.367--0.247 on admit. Likely all in setting of demand with acute GI bleed, no chest pain noted  - Hold PTA aspirin until hemoglobin stable and no bleeding on dabigatran for  several days  - Continue prior to admission simvastatin     Severe aortic stenosis  Followed by cardiology, has been undergoing TAVR work-up with plans for TAVR.  - Cardiology consult appreciated  - Follow up with structural cardiology clinic for clearance to proceed as planned given hospitalization for GI bleed    Left atrial appendage thrombosis  Diagnosed during work-up for TAVR 2/2020. Treated with dabigatran. No clear cut thrombus seen on ALINE 4/2020 though with contrast pattern suggestive of early/pre clot.   - Cardiology consulted during admission, recommended resuming dabigatran as soon as able given high clot risk   - Plan to resume dabigatran 5/27 PM if hemoglobin stable and no signs of further bleeding    Atrial fibrillation  Not on any chronic rate or rhythm control medications.  Heart rate in the 80s in the ER.  - Dabigatran managed as above      Chronic diastolic congestive heart failure  Echo as above with EF 50-55% with moderate concentric LVH, moderate decreased RVSF. Not on a diuretic at baseline.   - Monitor volume status       Hypertension  Not on any medications for BP control   - Monitor     Hyperlipidemia  Continue prior to admission simvastatin      Moderate persistent asthma  - Currently asymptomatic.  - Continue PTA duo nebs QID and Pulmicort BID     Chronic kidney disease stage III  Baseline creatinine is in the 1.4-1.7 range.   - Creatinine stable and at baseline      Senile hearing loss  Uses ASL to communicate, plans for one at bedside as noted in nursing note  -  available     Diet: Mechanical/Dental Soft Diet    DVT Prophylaxis: Anticoagulation as above  Pak Catheter: not present  Code Status: DNR/DNI      Disposition Plan   Expected discharge: Anticipate discharge in 1-2 days pending stable hemoglobin with anticoagulation restarted  Entered: Gary Alonso MD 05/27/2020, 11:50 AM       The patient's care was discussed with the Bedside Nurse, Patient and Patient's son  Forrest .    Gary Alonso MD  Hospitalist Service  [facility]    ______________________________________________________________________    Interval History   No acute events overnight.  Required transfusion earlier this morning.  Patient had bowel movement around 9 AM which was light brown without evidence for blood, melena.  Patient denies any abdominal pain, chest pain, shortness of breath, vision changes, focal numbness/tingling/weakness.    Data reviewed today: I reviewed all medications, new labs and imaging results over the last 24 hours. I personally reviewed no images or EKG's today.    Physical Exam   Vital Signs: Temp: 98.5  F (36.9  C) Temp src: Oral BP: 111/62 Pulse: 81 Heart Rate: 82 Resp: 17 SpO2: 99 % O2 Device: None (Room air) Oxygen Delivery: 3 LPM  Weight: 182 lbs 1.6 oz    Constitutional: Well-appearing, NAD  Respiratory: Clear to auscultation bilaterally, good air movement bilaterally  Cardiovascular: Irregularly irregular, III/VI systolic murmur heard best LUSB. No peripheral edema.  GI: Soft, non-tender, non-distended.   Skin/Integumen: Warm, dry  Other:      Data   Recent Labs   Lab 05/27/20  0658 05/26/20  2151 05/26/20  1333 05/26/20  0605  05/25/20  0442  05/24/20  1827 05/24/20  1446  05/23/20  2104   WBC 3.2*  --   --  3.5*  --  3.6*  --   --   --    < > 8.1   HGB 8.2* 7.1* 8.2* 7.7*   < > 7.4*   < > 7.9* 7.6*   < > 6.7*   MCV 82  --   --  80  --  77*  --   --   --    < > 68*   *  --   --  123*  --  124*  --   --   --    < > 228   INR  --   --   --   --   --   --   --   --   --   --  2.18*     --   --  141  --  144  --   --   --    < > 137   POTASSIUM 4.2  --   --  4.1  --  4.4  --   --   --    < > 4.9   CHLORIDE 112*  --   --  114*  --  115*  --   --   --    < > 106   CO2 23  --   --  22  --  24  --   --   --    < > 23   BUN 35*  --   --  51*  --  53*  --   --   --    < > 61*   CR 1.45*  --   --  1.31*  --  1.49*  --   --   --    < > 1.62*   ANIONGAP 5  --    --  5  --  5  --   --   --    < > 8   AYALA 8.1*  --   --  8.0*  --  8.1*  --   --   --    < > 9.3   *  --   --  115*  --  93  --   --   --    < > 189*   ALBUMIN  --   --   --   --   --   --   --   --   --   --  3.3*   PROTTOTAL  --   --   --   --   --   --   --   --   --   --  6.3*   BILITOTAL  --   --   --   --   --   --   --   --   --   --  0.7   ALKPHOS  --   --   --   --   --   --   --   --   --   --  43   ALT  --   --   --   --   --   --   --   --   --   --  23   AST  --   --   --   --   --   --   --   --   --   --  20   TROPI  --   --   --   --   --  0.247*  --  0.367* 0.410*   < >  --     < > = values in this interval not displayed.       No results found for this or any previous visit (from the past 24 hour(s)).  Medications     - MEDICATION INSTRUCTIONS -       - MEDICATION INSTRUCTIONS -         budesonide  0.5 mg Nebulization BID     diphenhydrAMINE  25 mg Oral Once     ipratropium - albuterol 0.5 mg/2.5 mg/3 mL  3 mL Nebulization 4x daily     pantoprazole  40 mg Oral BID AC     simvastatin  40 mg Oral At Bedtime     sucralfate  1 g Oral 4x Daily AC & HS

## 2020-05-27 NOTE — PROGRESS NOTES
[facility]  Gastroenterology Progress Note     Shiraz Ingram MRN# [id number]   YOB: 1934 Age: 85 year old          Assessment and Plan:   Interval History: Hemoglobin decreased to 7.1 last night and with one unit or PRBC improved to 8.2. Tolerated full liquid diet. No active signs of GI bleed. Has no complaints.     GI bleed  Anemia  Shiraz Ingram is a 85 year old male with multiple recent admission due to electrolytes disturbance w/ complex medical history including CAD, CABG, atrial fibrillation on anticoagulation with Pradaxa, known left atrial appendage clot, severe aortic stenosis upcoming plans for TAVR, stage III CKD, moderate persistent, chronic anemia and thrombocytopenia who presented w/ dizziness and melena.  EGD with esophageal bleeding cauterized with melenotic coffee ground seen in duodenum and not responding to transfusion.  Colonoscopy revealed no source for blood loss.  Tagged red blood cell suggests left sided GI bleed.    - Serial hemoglobin q 8 hours  - Switch to oral PPI BID  - Transfuse for hemoglobin of 7.0 or less.  - Advance from full liquid to soft diet            Upper GI bleed  Anemia due to GI blood loss  Elevated lactic acid level      Interval History:   no new complaints, doing well, denies chest pain, denies shortness of breath, denies abdominal pain, alert, oriented to person, place and time and doing well; no cp, sob, n/v/d, or abd pain.              Review of Systems:   C: NEGATIVE for fever, chills, change in weight  E/M: NEGATIVE for ear, mouth and throat problems  R: NEGATIVE for significant cough or SOB  CV: NEGATIVE for chest pain, palpitations or peripheral edema             Medications:   I have reviewed this patient's current medications    budesonide  0.5 mg Nebulization BID     diphenhydrAMINE  25 mg Oral Once     ipratropium - albuterol 0.5 mg/2.5 mg/3 mL  3 mL Nebulization 4x daily     pantoprazole (PROTONIX) IV  40 mg Intravenous BID      simvastatin  40 mg Oral At Bedtime     sodium chloride (PF)  3 mL Intracatheter Q8H     sucralfate  1 g Oral 4x Daily AC & HS                  Physical Exam:   Vitals were reviewed  Vital Signs with Ranges  Temp:  [97.5  F (36.4  C)-98.6  F (37  C)] 98.6  F (37  C)  Pulse:  [65-86] 65  Heart Rate:  [65-93] 68  Resp:  [8-27] 17  BP: ()/(34-89) 117/62  SpO2:  [91 %-100 %] 100 %  I/O last 3 completed shifts:  In: 945.33 [P.O.:600]  Out: 1245 [Urine:1245]  Constitutional: healthy, alert and no distress   Cardiovascular: negative, PMI normal. No lifts, heaves, or thrills. RRR. No murmurs, clicks gallops or rub  Respiratory: negative, Percussion normal. Good diaphragmatic excursion. Lungs clear  Head: Normocephalic. No masses, lesions, tenderness or abnormalities  Neck: Neck supple. No adenopathy. Thyroid symmetric, normal size,, Carotids without bruits.  Abdomen: Abdomen soft, non-tender. BS normal. No masses, organomegaly           Data:   I reviewed the patient's new clinical lab test results.   Recent Labs   Lab Test 05/27/20  0658 05/26/20  2151 05/26/20  1333 05/26/20  0605  05/25/20  0442  05/23/20  2104  04/24/20  0617 04/23/20  0325   WBC 3.2*  --   --  3.5*  --  3.6*   < > 8.1   < > 5.7 5.0   HGB 8.2* 7.1* 8.2* 7.7*   < > 7.4*   < > 6.7*   < > 11.3* 12.3*   MCV 82  --   --  80  --  77*   < > 68*   < > 66* 66*   *  --   --  123*  --  124*   < > 228   < > 104* 103*   INR  --   --   --   --   --   --   --  2.18*  --  1.24* 2.35*    < > = values in this interval not displayed.     Recent Labs   Lab Test 05/27/20  0658 05/26/20  0605 05/25/20  0442   POTASSIUM 4.2 4.1 4.4   CHLORIDE 112* 114* 115*   CO2 23 22 24   BUN 35* 51* 53*   ANIONGAP 5 5 5     Recent Labs   Lab Test 05/23/20  2104 05/14/20  0547 05/12/20  1605 04/23/20  0325  01/31/17  2110  02/19/13  2248 02/19/13 2115   ALBUMIN 3.3* 2.9*  --  3.8   < >  --    < >  --  3.4   BILITOTAL 0.7 0.5  --  1.0   < >  --    < >  --  0.8   ALT 23 21  --   32   < >  --    < >  --  55   AST 20 19  --  27   < >  --    < >  --  85*   PROTEIN  --   --  Negative  --   --  Negative  --  30*  --    LIPASE  --   --   --   --   --   --   --   --  125    < > = values in this interval not displayed.       I reviewed the patient's new imaging results.    All laboratory data reviewed  All imaging studies reviewed by me.    Tabatha Haines PA-C,  5/27/2020  [facility]  Office : [phone]  Cell: [phone] (Dr. Fournier)  Cell: [phone] (Tabatha Haines PA-C)

## 2020-05-27 NOTE — PLAN OF CARE
Discharge Planner PT   Patient plan for discharge: Home    Current status: Independent with transfers and ambulation. Negotiated 10 stair with single rail, supervision. Required 3 standing rests during gait/post stairs. Denies any pain or difficulty with mobility. Note elevated BP post activity 140s systolic. See VSFS.  All PT goals are met.    Barriers to return to prior living situation: None based on mobility    Recommendations for discharge: Home with A from Son's with ADLs prn, supervision on stairs    Rationale for recommendations: No further skilled PT needs. Discharge from PT. Pt should continued short duration, high frequency walks to improve activity tolerance.          Entered by: Kay Duran 05/27/2020 10:46 AM     Physical Therapy Discharge Summary    Reason for therapy discharge:    All goals and outcomes met, no further needs identified.    Progress towards therapy goal(s). See goals on Care Plan in [facility] electronic health record for goal details.  Goals met    Therapy recommendation(s):    .

## 2020-05-27 NOTE — PLAN OF CARE
A&O. Hypotensive at the beginning of the night and hgb came back at 7.1 so 1 unit PRBC was infused per orders and hospitalist instruction, vitals improved and awaiting results from hgb recheck. Patient asymptomatic ex pale. Tele: A fib CVR. LS clear. No BM this shift. Voiding adequate to urinal. Denies nausea and/or pain. Tolerating diet.

## 2020-05-27 NOTE — PROGRESS NOTES
Colonoscopy was completely unremarkable no signs of active bleeding small amount of melanotic material was noticed in the colon extensive diverticulosis.  No AVM.  Plan: Start patient on full liquid diet.  If patient's hemoglobin remains stable advance to soft diet.  Giving significant high risk for stroke patient will be at high risk for anticoagulation however patient can be started on anticoagulation cautiously.  If patient's hemoglobin remains stable in the next 24 hours patient can be started on Pradaxa.  Will recommend follow-up visit in GI clinic and video capsule endoscopy sooner than later.    Ignacio Fournier GI

## 2020-05-27 NOTE — PLAN OF CARE
A&O, AVSS on RA. Tele chronic afib CVR. Off IMC status. BM today with no melena noted. Advanced to soft diet per GI, tolerated well no N/V. Ambulating well in hallways SBA, PT signed off plan for home with son for assist. Plan for Hgb recheck this afternoon with possibility of restarting of anticoagulation. Pt communicates in ASL,  intermittently scheduled and at bedside with MD, RN able to write at bedside and pt able to make needs known. Will cont to monitor.

## 2020-05-28 ENCOUNTER — TELEPHONE (OUTPATIENT)
Dept: FAMILY MEDICINE | Facility: CLINIC | Age: 85
End: 2020-05-28

## 2020-05-28 ENCOUNTER — OFFICE VISIT (OUTPATIENT)
Dept: INTERPRETER SERVICES | Facility: CLINIC | Age: 85
End: 2020-05-28
Payer: COMMERCIAL

## 2020-05-28 LAB
ABO + RH BLD: NORMAL
ABO + RH BLD: NORMAL
ANION GAP SERPL CALCULATED.3IONS-SCNC: 5 MMOL/L (ref 3–14)
BLD GP AB SCN SERPL QL: NORMAL
BLD PROD TYP BPU: NORMAL
BLD PROD TYP BPU: NORMAL
BLD UNIT ID BPU: 0
BLOOD BANK CMNT PATIENT-IMP: NORMAL
BLOOD PRODUCT CODE: NORMAL
BPU ID: NORMAL
BUN SERPL-MCNC: 26 MG/DL (ref 7–30)
CALCIUM SERPL-MCNC: 7.8 MG/DL (ref 8.5–10.1)
CHLORIDE SERPL-SCNC: 108 MMOL/L (ref 94–109)
CO2 SERPL-SCNC: 23 MMOL/L (ref 20–32)
CREAT SERPL-MCNC: 1.42 MG/DL (ref 0.66–1.25)
ERYTHROCYTE [DISTWIDTH] IN BLOOD BY AUTOMATED COUNT: 24.4 % (ref 10–15)
GFR SERPL CREATININE-BSD FRML MDRD: 45 ML/MIN/{1.73_M2}
GLUCOSE SERPL-MCNC: 97 MG/DL (ref 70–99)
HCT VFR BLD AUTO: 23.3 % (ref 40–53)
HGB BLD-MCNC: 7.3 G/DL (ref 13.3–17.7)
HGB BLD-MCNC: 8.4 G/DL (ref 13.3–17.7)
HGB BLD-MCNC: 8.7 G/DL (ref 13.3–17.7)
MCH RBC QN AUTO: 25.3 PG (ref 26.5–33)
MCHC RBC AUTO-ENTMCNC: 31.3 G/DL (ref 31.5–36.5)
MCV RBC AUTO: 81 FL (ref 78–100)
NUM BPU REQUESTED: 2
PLATELET # BLD AUTO: 99 10E9/L (ref 150–450)
POTASSIUM SERPL-SCNC: 4.1 MMOL/L (ref 3.4–5.3)
RBC # BLD AUTO: 2.89 10E12/L (ref 4.4–5.9)
SODIUM SERPL-SCNC: 136 MMOL/L (ref 133–144)
SPECIMEN EXP DATE BLD: NORMAL
TRANSFUSION STATUS PATIENT QL: NORMAL
TRANSFUSION STATUS PATIENT QL: NORMAL
WBC # BLD AUTO: 2.6 10E9/L (ref 4–11)

## 2020-05-28 PROCEDURE — 12000047 ZZH R&B IMCU

## 2020-05-28 PROCEDURE — 80048 BASIC METABOLIC PNL TOTAL CA: CPT | Performed by: INTERNAL MEDICINE

## 2020-05-28 PROCEDURE — 94640 AIRWAY INHALATION TREATMENT: CPT | Mod: 76

## 2020-05-28 PROCEDURE — 85018 HEMOGLOBIN: CPT | Performed by: INTERNAL MEDICINE

## 2020-05-28 PROCEDURE — P9016 RBC LEUKOCYTES REDUCED: HCPCS | Performed by: HOSPITALIST

## 2020-05-28 PROCEDURE — T1013 SIGN LANG/ORAL INTERPRETER: HCPCS | Mod: U3

## 2020-05-28 PROCEDURE — 40000275 ZZH STATISTIC RCP TIME EA 10 MIN

## 2020-05-28 PROCEDURE — 36415 COLL VENOUS BLD VENIPUNCTURE: CPT | Performed by: INTERNAL MEDICINE

## 2020-05-28 PROCEDURE — 25000125 ZZHC RX 250: Performed by: HOSPITALIST

## 2020-05-28 PROCEDURE — 25000132 ZZH RX MED GY IP 250 OP 250 PS 637: Performed by: INTERNAL MEDICINE

## 2020-05-28 PROCEDURE — 85027 COMPLETE CBC AUTOMATED: CPT | Performed by: INTERNAL MEDICINE

## 2020-05-28 PROCEDURE — 94640 AIRWAY INHALATION TREATMENT: CPT

## 2020-05-28 PROCEDURE — 99233 SBSQ HOSP IP/OBS HIGH 50: CPT | Performed by: INTERNAL MEDICINE

## 2020-05-28 PROCEDURE — 25000132 ZZH RX MED GY IP 250 OP 250 PS 637: Performed by: HOSPITALIST

## 2020-05-28 PROCEDURE — 25000132 ZZH RX MED GY IP 250 OP 250 PS 637: Performed by: PHYSICIAN ASSISTANT

## 2020-05-28 RX ORDER — CALCIUM CARBONATE 500 MG/1
1000 TABLET, CHEWABLE ORAL EVERY 4 HOURS PRN
Status: DISCONTINUED | OUTPATIENT
Start: 2020-05-28 | End: 2020-05-30 | Stop reason: HOSPADM

## 2020-05-28 RX ADMIN — CALCIUM CARBONATE (ANTACID) CHEW TAB 500 MG 1000 MG: 500 CHEW TAB at 19:51

## 2020-05-28 RX ADMIN — SUCRALFATE 1 G: 1 SUSPENSION ORAL at 07:38

## 2020-05-28 RX ADMIN — IPRATROPIUM BROMIDE AND ALBUTEROL SULFATE 3 ML: .5; 3 SOLUTION RESPIRATORY (INHALATION) at 19:37

## 2020-05-28 RX ADMIN — SUCRALFATE 1 G: 1 SUSPENSION ORAL at 11:46

## 2020-05-28 RX ADMIN — IPRATROPIUM BROMIDE AND ALBUTEROL SULFATE 3 ML: .5; 3 SOLUTION RESPIRATORY (INHALATION) at 11:16

## 2020-05-28 RX ADMIN — IPRATROPIUM BROMIDE AND ALBUTEROL SULFATE 3 ML: .5; 3 SOLUTION RESPIRATORY (INHALATION) at 16:02

## 2020-05-28 RX ADMIN — IPRATROPIUM BROMIDE AND ALBUTEROL SULFATE 3 ML: .5; 3 SOLUTION RESPIRATORY (INHALATION) at 08:22

## 2020-05-28 RX ADMIN — PANTOPRAZOLE SODIUM 40 MG: 40 TABLET, DELAYED RELEASE ORAL at 07:38

## 2020-05-28 RX ADMIN — DABIGATRAN ETEXILATE MESYLATE 150 MG: 150 CAPSULE ORAL at 21:24

## 2020-05-28 RX ADMIN — BUDESONIDE 0.5 MG: 0.5 INHALANT RESPIRATORY (INHALATION) at 08:23

## 2020-05-28 RX ADMIN — SUCRALFATE 1 G: 1 SUSPENSION ORAL at 16:35

## 2020-05-28 RX ADMIN — SIMVASTATIN 40 MG: 40 TABLET, FILM COATED ORAL at 21:25

## 2020-05-28 RX ADMIN — BUDESONIDE 0.5 MG: 0.5 INHALANT RESPIRATORY (INHALATION) at 19:37

## 2020-05-28 RX ADMIN — SUCRALFATE 1 G: 1 SUSPENSION ORAL at 21:25

## 2020-05-28 RX ADMIN — PANTOPRAZOLE SODIUM 40 MG: 40 TABLET, DELAYED RELEASE ORAL at 16:35

## 2020-05-28 ASSESSMENT — ACTIVITIES OF DAILY LIVING (ADL)
ADLS_ACUITY_SCORE: 16

## 2020-05-28 NOTE — PROGRESS NOTES
[facility]    Medicine Progress Note - Hospitalist Service       Date of Admission:  5/23/2020  Assessment & Plan   Shiraz Ingram is a 85 year old male with complex past medical history including critical aortic stenosis with upcoming plan for TAVR, coronary artery disease with prior bypass, atrial fibrillation with left atrial thrombus, senile hearing loss, chronic anemia, chronic diastolic congestive heart failure, hypertension, hyperlipidemia, moderate persistent asthma, chronic kidney disease stage 3 who was admitted on 5/23/2020 with lightheadedness and melena, found to have acute GI bleed requiring blood transfusion.     Acute GI bleed, bleeding esophageal ulcer   Acute blood loss anemia  Chronic anemia  Lightheaded for for a few days PTA, developed melena on the day of admission. Hemoglobin 6.7, down from 8.4 two days prior to admission and baseline of around 11 earlier this month. Hemoccult positive in the ER. Hemodynamically stable in the ER, however lactic acid was elevated to 6.0.  EGD 5/24 found bleeding esophageal ulcer treated with cautery and APC after failed injection. RBC tagged study confirms left mid-abdomen bleed on 5/25.  - GI ([place]) consulted, following. Discussed with Dr. Fournier 5/28  - Continue oral PPI   - Conditional transfusion orders for hgb <7.5 g/dl  - Resumed dabigatran 5/27 PM, required additional transfusion 5/28 so held for AM dose. Continues to have non-bloody stools and hemoglobin increased greater than expected after transfusion, will plan to cautiously continue dabigatran as otherwise hemodynamically stable and no evidence for rapid/serious bleed at this point.   - Continue hemoglobin q8H  - PT recommends home with assist of son on discharge  - Clear liquid diet 5/28, NPO at midnight in event repeat endoscopy needed 5/29     COVID-19 ruled out  Asymptomatic COVID-19 testing obtained in anticipation of endoscopy negative.      Coronary artery disease status post  CABG in the early 2000's  NSTEMI, type 2 secondary to demand  Asymptomatic. Troponin trend 0.435--0.410--0.367--0.247 on admit. Likely all in setting of demand with acute GI bleed, no chest pain noted  - Hold PTA aspirin until hemoglobin stable and no bleeding on dabigatran for several days  - Continue prior to admission simvastatin     Severe aortic stenosis  Followed by cardiology, has been undergoing TAVR work-up with plans for TAVR.  - Cardiology consult appreciated  - Follow up with structural cardiology clinic for clearance to proceed as planned given hospitalization for GI bleed    Left atrial appendage thrombosis  Diagnosed during work-up for TAVR 2/2020. Treated with dabigatran. No clear cut thrombus seen on ALINE 4/2020 though with contrast pattern suggestive of early/pre clot.   - Cardiology consulted during admission, recommended resuming dabigatran as soon as able given high clot risk   - Dabigatran managed as above     Atrial fibrillation  Not on any chronic rate or rhythm control medications.  Heart rate in the 80s in the ER.  - Dabigatran managed as above      Chronic diastolic congestive heart failure  Echo as above with EF 50-55% with moderate concentric LVH, moderate decreased RVSF. Not on a diuretic at baseline.   - Monitor volume status       Hypertension  Not on any medications for BP control   - Monitor     Hyperlipidemia  Continue prior to admission simvastatin      Moderate persistent asthma  - Currently asymptomatic.  - Continue PTA duo nebs QID and Pulmicort BID     Chronic kidney disease stage III  Baseline creatinine is in the 1.4-1.7 range.   - Creatinine stable and at baseline      Senile hearing loss  Uses ASL to communicate, plans for one at bedside as noted in nursing note  -  available     Diet: Clear Liquid Diet  NPO per Anesthesia Guidelines for Procedure/Surgery Except for: Meds    DVT Prophylaxis: Anticoagulation as above  Pak Catheter: not present  Code Status:  DNR/DNI      Disposition Plan   Expected discharge: Anticipate discharge in 1-2 days pending stable hemoglobin with anticoagulation restarted  Entered: Gary Alonso MD 05/28/2020, 3:12 PM       The patient's care was discussed with the Bedside Nurse, Patient, Gastroenterology Team and Patient's son Forrest .    Gary Alonso MD  Hospitalist Service  [facility]    ______________________________________________________________________    Interval History   No acute events overnight.  Required transfusion this morning. Has had small yellow stool with no evidence for blood or melena. Denies any chest pain or shortness of breath.     Data reviewed today: I reviewed all medications, new labs and imaging results over the last 24 hours. I personally reviewed no images or EKG's today.    Physical Exam   Vital Signs: Temp: 98.1  F (36.7  C) Temp src: Oral BP: 123/63 Pulse: 78 Heart Rate: 67 Resp: 18 SpO2: 97 % O2 Device: None (Room air)    Weight: 182 lbs 1.6 oz    Constitutional: Well-appearing, NAD  Respiratory: Clear to auscultation bilaterally, good air movement bilaterally  Cardiovascular: Irregularly irregular, III/VI systolic murmur heard best LUSB. No peripheral edema.  GI: Soft, non-tender, non-distended.   Skin/Integumen: Warm, dry  Other:      Data   Recent Labs   Lab 05/28/20  1347 05/28/20  0602 05/27/20  2144  05/27/20  0658  05/26/20  0605  05/25/20  0442  05/24/20  1827 05/24/20  1446  05/23/20  2104   WBC  --  2.6*  --   --  3.2*  --  3.5*  --  3.6*  --   --   --    < > 8.1   HGB 8.7* 7.3* 7.8*   < > 8.2*   < > 7.7*   < > 7.4*   < > 7.9* 7.6*   < > 6.7*   MCV  --  81  --   --  82  --  80  --  77*  --   --   --    < > 68*   PLT  --  99*  --   --  118*  --  123*  --  124*  --   --   --    < > 228   INR  --   --   --   --   --   --   --   --   --   --   --   --   --  2.18*   NA  --  136  --   --  140  --  141  --  144  --   --   --    < > 137   POTASSIUM  --  4.1  --   --  4.2  --  4.1  --   4.4  --   --   --    < > 4.9   CHLORIDE  --  108  --   --  112*  --  114*  --  115*  --   --   --    < > 106   CO2  --  23  --   --  23  --  22  --  24  --   --   --    < > 23   BUN  --  26  --   --  35*  --  51*  --  53*  --   --   --    < > 61*   CR  --  1.42*  --   --  1.45*  --  1.31*  --  1.49*  --   --   --    < > 1.62*   ANIONGAP  --  5  --   --  5  --  5  --  5  --   --   --    < > 8   AYALA  --  7.8*  --   --  8.1*  --  8.0*  --  8.1*  --   --   --    < > 9.3   GLC  --  97  --   --  107*  --  115*  --  93  --   --   --    < > 189*   ALBUMIN  --   --   --   --   --   --   --   --   --   --   --   --   --  3.3*   PROTTOTAL  --   --   --   --   --   --   --   --   --   --   --   --   --  6.3*   BILITOTAL  --   --   --   --   --   --   --   --   --   --   --   --   --  0.7   ALKPHOS  --   --   --   --   --   --   --   --   --   --   --   --   --  43   ALT  --   --   --   --   --   --   --   --   --   --   --   --   --  23   AST  --   --   --   --   --   --   --   --   --   --   --   --   --  20   TROPI  --   --   --   --   --   --   --   --  0.247*  --  0.367* 0.410*   < >  --     < > = values in this interval not displayed.       No results found for this or any previous visit (from the past 24 hour(s)).  Medications     - MEDICATION INSTRUCTIONS -       - MEDICATION INSTRUCTIONS -         budesonide  0.5 mg Nebulization BID     dabigatran ANTICOAGULANT  150 mg Oral BID     diphenhydrAMINE  25 mg Oral Once     ipratropium - albuterol 0.5 mg/2.5 mg/3 mL  3 mL Nebulization 4x daily     pantoprazole  40 mg Oral BID AC     simvastatin  40 mg Oral At Bedtime     sucralfate  1 g Oral 4x Daily AC & HS

## 2020-05-28 NOTE — PLAN OF CARE
VSS. A&Ox4. Tele Afib CVR. Denies pain. LS clear. +Gas, +BS. No BMs overnight. Voiding. Mechanical soft diet. Ambulating SBA. Pt deaf, communicates with ASL, able to write and communitate needs when  no present. Hgb of 7.3 this AM. Will need 1 unit PRBCs (transfuse if <7.5).

## 2020-05-28 NOTE — PLAN OF CARE
AVSS. Rhythm controlled Afib. No hematemesis or tarry stools however, Hgb down-trending, at 7.3 in AM , 1 RBC transfused per order. Hgb stable now. OOB and ambulating in halls. Will continue to monitor.

## 2020-05-28 NOTE — PROGRESS NOTES
Mr. Ingram is clinically doing well patient does not have any clinical signs of bleeding no further bowel movement however patient's hemoglobin has dropped from 8-7.3 patient was restarted on Pradaxa yesterday patient is very high risk for embolization with active clot in the left heart in the left atrium.  Patient work-up so far has been fairly inconclusive regarding the localization of source of GI bleed patient had couple lesions in the stomach those were cauterized.  Patient's colonoscopy was unremarkable at that time patient did not have any active bleeding.  Patient's current findings were discussed in detail with hospitalist team I will recommend to start him on clear liquid diet today continue on serial hemoglobins if patient's hemoglobin drops further we will attempt to repeat upper GI endoscopy with small bowel enteroscopy and possible colonoscopy other option including follow-up and video capsule endoscopy were also discussed.  Patient's previous bleeding scan showed bleeding in the left upper quadrant area which is more likely to be in the proximal jejunum.  Thank you very much for letting me participate in his care I will continue to monitor and follow him closely.    Ignacio Fournier MD

## 2020-05-28 NOTE — TELEPHONE ENCOUNTER
"Dr. Rodriguez-Please review.  Patient's son, Forrest, asks you to call him within the next few days.  Writer does note a Cardiology Care Coordination note dated 5/27/20 where it is noted TAVR will be scheduled once hemoglobin stable.      Consent to communicate with Forrest on file.    Writer called Forrest who stated:  1. Patient current admitted to [facility]  2. Cardiology team called him (Forrets) and told him to call patient's PCP and discus with Dr. Rodriguez patient will need to be \"watched\" once discharged from hospital with blood tests   A. Once patient is stable from Dr. Rodriguez's standpoint, Forrest is to call Cardiology clinic back to schedule patient's heart surgery    Thank you!  BARNEY Mejía, BSN, RN        "

## 2020-05-28 NOTE — TELEPHONE ENCOUNTER
Reason for Call:  Other call back    Detailed comments: Patients son called and is requested to PCP to go over a few things from an appointment with the heart doctor.    Phone Number Patient can be reached at: Home number on file [phone]    Best Time: Any     Can we leave a detailed message on this number? YES    Call taken on 5/28/2020 at 1:51 PM by Jovanni García

## 2020-05-29 LAB
HGB BLD-MCNC: 8.2 G/DL (ref 13.3–17.7)
HGB BLD-MCNC: 9.1 G/DL (ref 13.3–17.7)

## 2020-05-29 PROCEDURE — 85018 HEMOGLOBIN: CPT | Performed by: INTERNAL MEDICINE

## 2020-05-29 PROCEDURE — 94640 AIRWAY INHALATION TREATMENT: CPT | Mod: 76

## 2020-05-29 PROCEDURE — 40000275 ZZH STATISTIC RCP TIME EA 10 MIN

## 2020-05-29 PROCEDURE — 94640 AIRWAY INHALATION TREATMENT: CPT

## 2020-05-29 PROCEDURE — 25000125 ZZHC RX 250: Performed by: HOSPITALIST

## 2020-05-29 PROCEDURE — 12000047 ZZH R&B IMCU

## 2020-05-29 PROCEDURE — 25000132 ZZH RX MED GY IP 250 OP 250 PS 637: Performed by: INTERNAL MEDICINE

## 2020-05-29 PROCEDURE — 36415 COLL VENOUS BLD VENIPUNCTURE: CPT | Performed by: INTERNAL MEDICINE

## 2020-05-29 PROCEDURE — 99232 SBSQ HOSP IP/OBS MODERATE 35: CPT | Performed by: INTERNAL MEDICINE

## 2020-05-29 PROCEDURE — 25000132 ZZH RX MED GY IP 250 OP 250 PS 637: Performed by: HOSPITALIST

## 2020-05-29 PROCEDURE — 25000132 ZZH RX MED GY IP 250 OP 250 PS 637: Performed by: PHYSICIAN ASSISTANT

## 2020-05-29 RX ADMIN — DABIGATRAN ETEXILATE MESYLATE 150 MG: 150 CAPSULE ORAL at 08:29

## 2020-05-29 RX ADMIN — SUCRALFATE 1 G: 1 SUSPENSION ORAL at 23:40

## 2020-05-29 RX ADMIN — IPRATROPIUM BROMIDE AND ALBUTEROL SULFATE 3 ML: .5; 3 SOLUTION RESPIRATORY (INHALATION) at 15:39

## 2020-05-29 RX ADMIN — IPRATROPIUM BROMIDE AND ALBUTEROL SULFATE 3 ML: .5; 3 SOLUTION RESPIRATORY (INHALATION) at 12:59

## 2020-05-29 RX ADMIN — SUCRALFATE 1 G: 1 SUSPENSION ORAL at 17:46

## 2020-05-29 RX ADMIN — SIMVASTATIN 40 MG: 40 TABLET, FILM COATED ORAL at 21:12

## 2020-05-29 RX ADMIN — IPRATROPIUM BROMIDE AND ALBUTEROL SULFATE 3 ML: .5; 3 SOLUTION RESPIRATORY (INHALATION) at 07:50

## 2020-05-29 RX ADMIN — DABIGATRAN ETEXILATE MESYLATE 150 MG: 150 CAPSULE ORAL at 21:12

## 2020-05-29 RX ADMIN — PANTOPRAZOLE SODIUM 40 MG: 40 TABLET, DELAYED RELEASE ORAL at 17:46

## 2020-05-29 RX ADMIN — PANTOPRAZOLE SODIUM 40 MG: 40 TABLET, DELAYED RELEASE ORAL at 08:29

## 2020-05-29 RX ADMIN — BUDESONIDE 0.5 MG: 0.5 INHALANT RESPIRATORY (INHALATION) at 19:27

## 2020-05-29 RX ADMIN — SUCRALFATE 1 G: 1 SUSPENSION ORAL at 11:52

## 2020-05-29 RX ADMIN — IPRATROPIUM BROMIDE AND ALBUTEROL SULFATE 3 ML: .5; 3 SOLUTION RESPIRATORY (INHALATION) at 19:27

## 2020-05-29 RX ADMIN — BUDESONIDE 0.5 MG: 0.5 INHALANT RESPIRATORY (INHALATION) at 07:54

## 2020-05-29 RX ADMIN — ACETAMINOPHEN 650 MG: 325 TABLET, FILM COATED ORAL at 11:52

## 2020-05-29 ASSESSMENT — ACTIVITIES OF DAILY LIVING (ADL)
ADLS_ACUITY_SCORE: 16

## 2020-05-29 NOTE — TELEPHONE ENCOUNTER
Patient is scheduled to see me in the clinic on Monday.   Ask him to keep that appointment and son should come in for the appt to clarify his concerns.

## 2020-05-29 NOTE — PROGRESS NOTES
[facility]  Gastroenterology Progress Note     Shiraz Ingram MRN# [id number]   YOB: 1934 Age: 85 year old          Assessment and Plan:    Interval History: Hemoglobin decreased to 7.3 overnight and with one unit or PRBC improved to 8.2. Hemoglobin stable but drifting down.  No active signs of GI bleed. Has no complaints.      GI bleed  Anemia  Shiraz Ingram is a 85 year old male with multiple recent admission due to electrolytes disturbance w/ complex medical history including CAD, CABG, atrial fibrillation on anticoagulation with Pradaxa, known left atrial appendage clot, severe aortic stenosis upcoming plans for TAVR, stage III CKD, moderate persistent, chronic anemia and thrombocytopenia who presented w/ dizziness and melena.  EGD with esophageal bleeding cauterized with melenotic coffee ground seen in duodenum and not responding to transfusion.  Colonoscopy revealed no source for blood loss.  Tagged red blood cell suggests left sided GI bleed.  Restarted on dabigatran on 5/27 and has had ongoing decrease in hemoglobin. Will plan push enteroscopy today. TO assess small bowel.     - Serial hemoglobin q 8 hours  - Continue on oral PPI BID  - Transfuse for hemoglobin of 7.0 or less.  - Plan push enteroscopy today  - keep NPO            Upper GI bleed  Anemia due to GI blood loss  Elevated lactic acid level      Interval History:   no new complaints, doing well, denies chest pain, denies shortness of breath, denies abdominal pain, alert, oriented to person, place and time and doing well; no cp, sob, n/v/d, or abd pain.              Review of Systems:   C: NEGATIVE for fever, chills, change in weight  E/M: NEGATIVE for ear, mouth and throat problems  R: NEGATIVE for significant cough or SOB  CV: NEGATIVE for chest pain, palpitations or peripheral edema             Medications:   I have reviewed this patient's current medications    budesonide  0.5 mg Nebulization BID     dabigatran  ANTICOAGULANT  150 mg Oral BID     diphenhydrAMINE  25 mg Oral Once     ipratropium - albuterol 0.5 mg/2.5 mg/3 mL  3 mL Nebulization 4x daily     pantoprazole  40 mg Oral BID AC     simvastatin  40 mg Oral At Bedtime     sucralfate  1 g Oral 4x Daily AC & HS                  Physical Exam:   Vitals were reviewed  Vital Signs with Ranges  Temp:  [98  F (36.7  C)-98.4  F (36.9  C)] 98.2  F (36.8  C)  Pulse:  [60-78] 60  Heart Rate:  [63-79] 63  Resp:  [16-18] 16  BP: ()/(52-73) 94/61  SpO2:  [96 %-98 %] 96 %  I/O last 3 completed shifts:  In: 1440 [P.O.:1140]  Out: -   Constitutional: healthy, alert and no distress   Cardiovascular: negative, PMI normal. No lifts, heaves, or thrills. RRR. No murmurs, clicks gallops or rub  Respiratory: negative, Percussion normal. Good diaphragmatic excursion. Lungs clear  Head: Normocephalic. No masses, lesions, tenderness or abnormalities  Neck: Neck supple. No adenopathy. Thyroid symmetric, normal size,, Carotids without bruits.  Abdomen: Abdomen soft, non-tender. BS normal. No masses, organomegaly           Data:   I reviewed the patient's new clinical lab test results.   Recent Labs   Lab Test 05/29/20  0718 05/28/20  2227 05/28/20  1347 05/28/20  0602  05/27/20  0658  05/26/20  0605  05/23/20  2104  04/24/20  0617 04/23/20  0325   WBC  --   --   --  2.6*  --  3.2*  --  3.5*   < > 8.1   < > 5.7 5.0   HGB 8.2* 8.4* 8.7* 7.3*   < > 8.2*   < > 7.7*   < > 6.7*   < > 11.3* 12.3*   MCV  --   --   --  81  --  82  --  80   < > 68*   < > 66* 66*   PLT  --   --   --  99*  --  118*  --  123*   < > 228   < > 104* 103*   INR  --   --   --   --   --   --   --   --   --  2.18*  --  1.24* 2.35*    < > = values in this interval not displayed.     Recent Labs   Lab Test 05/28/20  0602 05/27/20  0658 05/26/20  0605   POTASSIUM 4.1 4.2 4.1   CHLORIDE 108 112* 114*   CO2 23 23 22   BUN 26 35* 51*   ANIONGAP 5 5 5     Recent Labs   Lab Test 05/23/20  2104 05/14/20  0547 05/12/20  1605  04/23/20  0325  01/31/17 2110  02/19/13  2248 02/19/13 2115   ALBUMIN 3.3* 2.9*  --  3.8   < >  --    < >  --  3.4   BILITOTAL 0.7 0.5  --  1.0   < >  --    < >  --  0.8   ALT 23 21  --  32   < >  --    < >  --  55   AST 20 19  --  27   < >  --    < >  --  85*   PROTEIN  --   --  Negative  --   --  Negative  --  30*  --    LIPASE  --   --   --   --   --   --   --   --  125    < > = values in this interval not displayed.       I reviewed the patient's new imaging results.    All laboratory data reviewed  All imaging studies reviewed by me.    Tabatha Haines PA-C,  5/29/2020  [facility]  Office : [phone]  Cell: [phone] (Dr. Fournier)  Cell: [phone] (Tabatha Haines PA-C)

## 2020-05-29 NOTE — PROGRESS NOTES
CLINICAL NUTRITION SERVICES  -  ASSESSMENT NOTE    Recommendations Ordered by Registered Dietitian (RD):   - full liquid supplements for additional protein    Malnutrition:   % Weight Loss:  > 7.5% in 3 months (severe malnutrition)  % Intake:  <75% for > 7 days (non-severe malnutrition)  Subcutaneous Fat Loss:  Deferred  Muscle Loss:  Deferred  Fluid Retention:  Does not meet criteria     Malnutrition Diagnosis: Non-Severe malnutrition  In Context of:  Acute illness or injury  Chronic illness or disease     REASON FOR ASSESSMENT  Shiraz Ingram is a 85 year old male seen by Registered Dietitian for LOS    NUTRITION HISTORY  - Information obtained from EMR - unable to speak with patient over the phone, as he communicates via ASL (current departmental precautions during COVID19 Pandemic).   - Medical history includes critical aortic stenosis, planned TAVR, CAD with prior bypass, afib, senile hearing loss, chronic anemia, diastolic congestive heart failure, hypertension, hyperlipidemia, persistent asthma, CKD3.   - Admitted with lightheadedness and melena found to have GIB.   - Appetite had been normal during prior admission earlier in May, as well as noted in recent office visits.   - NKFA      CURRENT NUTRITION ORDERS  Diet Order:     2g Na diet     5/29: 2g Na diet  5/28: CLD  5/27: Mechanical Dental Soft  5/23-5/26: NPO/CLD    Planned NPO tomorrow for possible procedure.     Current Intake/Tolerance:  Patient is consuming 100% of meals received, per RN documentation.   Missed breakfast this morning due to NPO for cancelled procedure.     NUTRITION FOCUSED PHYSICAL ASSESSMENT FOR DIAGNOSING MALNUTRITION)  No:  Deferred due to departmental precautions to prevent spread of COVID19 during Pandemic.          Observed:    Deferred    Obtained from Chart/Interdisciplinary Team:  Trace edema  +BM on 5/28  Sunny - Nutrition 3; total 20    GI following -- Possible push enteroscopy in the next 1-2 days.  "    ANTHROPOMETRICS  Height: 5' 5.5\"  Weight: 67.1 kg  Body mass index is 24.3 kg/m .  Weight Status:  Normal BMI  IBW: 63.2 kg  % IBW: 106%  Weight History: Weight elevated currently. Up to a 4-5# loss in <1 month (2.6-3.3%). 26# loss in the past 3 months indicated (15%)   Wt Readings from Last 10 Encounters:   05/29/20 82.8 kg (182 lb 8.7 oz)   05/18/20 70.3 kg (155 lb)   05/14/20 70.6 kg (155 lb 11.2 oz)   05/08/20 69.3 kg (152 lb 12.8 oz)   05/05/20 69.3 kg (152 lb 12.8 oz)   04/28/20 70.9 kg (156 lb 4.8 oz)   03/16/20 75.3 kg (166 lb)   03/05/20 79 kg (174 lb 3.2 oz)   02/26/20 79.7 kg (175 lb 12 oz)   02/24/20 80.3 kg (177 lb)     LABS  Labs reviewed    MEDICATIONS  Medications reviewed    ASSESSED NUTRITION NEEDS PER APPROVED PRACTICE GUIDELINES:  Dosing Weight 68 kg - adjusted   Estimated Energy Needs: 8512-9866 kcals (25-30 Kcal/Kg)  Justification: maintenance  Estimated Protein Needs:  grams protein (1.2-1.5 g pro/Kg)  Justification: preservation of lean body mass  Estimated Fluid Needs: 1 mL/kcal  Justification: maintenance    MALNUTRITION:  % Weight Loss:  > 7.5% in 3 months (severe malnutrition)  % Intake:  <75% for > 7 days (non-severe malnutrition)  Subcutaneous Fat Loss:  Deferred  Muscle Loss:  Deferred  Fluid Retention:  Does not meet criteria     Malnutrition Diagnosis: Non-Severe malnutrition  In Context of:  Acute illness or injury  Chronic illness or disease    NUTRITION DIAGNOSIS:  Inadequate oral intake related to altered GI function with GIB on admission as evidenced by NPO/CLD for 3 days of 6 day admission, on solids for 1-2 days.       NUTRITION INTERVENTIONS  Recommendations / Nutrition Prescription  Diet per GI, with consideration of heart disease (continue 2g Na diet).     Start full liquid supplements w/ meals TID to provide additional protein.     Implementation  Nutrition education: Per Provider order if indicated   Medical Food Supplement: as above    Nutrition Goals  Intake " of at least 75% solid meals BID-TID.     MONITORING AND EVALUATION:  Progress towards goals will be monitored and evaluated per protocol and Practice Guidelines    Prema Garcia RD, LD  Pager: [phone]

## 2020-05-29 NOTE — PLAN OF CARE
VSS, pt denies pain. Pt ambulated in the halls x2 this evening. PT had x1 formed, brown stool this evening. Tolerated clear liquids.

## 2020-05-29 NOTE — PROGRESS NOTES
[facility]    Medicine Progress Note - Hospitalist Service       Date of Admission:  5/23/2020  Assessment & Plan   Shiraz Ingram is an 85 year-old male with complex past medical history including critical aortic stenosis with upcoming plan for TAVR, coronary artery disease with prior bypass, atrial fibrillation with left atrial thrombus, senile hearing loss, chronic anemia, chronic diastolic congestive heart failure, hypertension, hyperlipidemia, moderate persistent asthma, chronic kidney disease stage 3 who was admitted on 5/23/2020 with lightheadedness and melena, found to have acute GI bleed requiring blood transfusion.     Acute GI bleed secondary to bleeding esophageal ulcer   Acute blood loss anemia on chronic anemia   Lightheaded for for a few days PTA, developed melena on the day of admission. Hemoglobin 6.7, down from 8.4 two days prior to admission and baseline of around 11 earlier this month. Hemoccult positive in the ER. Hemodynamically stable in the ER, however lactic acid was elevated to 6.0.  EGD 5/24 found bleeding esophageal ulcer treated with cautery and APC after failed injection. RBC tagged study confirms left mid-abdomen bleed on 5/25.  - GI ([facility]) consulted, following. Discussed with Jorge Fournier and Benji 5/29, appreciate assistance.   - Continue oral PPI   - Conditional transfusion orders for hgb <7.5 g/dl  - Continue dabigatran  - Having brown stools, hemoglobin generally stable  - Reduce hemoglobin monitoring to q12H, continue to monitor stools  - NPO at midnight if develops signs of bleeding or continuous hemoglobin drop for consideration of push enteroscopy 5/30/20   - 2 gram sodium restricted diet ordered   - PT recommends home with assist of son on discharge     COVID-19 ruled out  Asymptomatic COVID-19 testing obtained in anticipation of endoscopy negative.      Coronary artery disease status post CABG in the early 2000's  NSTEMI, type 2 secondary to  demand  Asymptomatic. Troponin trend 0.435--0.410--0.367--0.247 on admit. Likely all in setting of demand with acute GI bleed, no chest pain noted  - Hold PTA aspirin until hemoglobin stable and no bleeding on dabigatran for several days  - Continue prior to admission simvastatin     Severe aortic stenosis  Followed by cardiology, has been undergoing TAVR work-up with plans for TAVR.  - Cardiology consult appreciated  - Follow up with structural cardiology clinic for clearance to proceed as planned given hospitalization for GI bleed    Left atrial appendage thrombosis  Diagnosed during work-up for TAVR 2/2020. Treated with dabigatran. No clear cut thrombus seen on ALINE 4/2020 though with contrast pattern suggestive of early/pre clot.   - Cardiology consulted during admission, recommended resuming dabigatran as soon as able given high clot risk   - Dabigatran managed as above     Atrial fibrillation  Not on any chronic rate or rhythm control medications.  Heart rate in the 80s in the ER.  - Dabigatran managed as above      Chronic diastolic congestive heart failure  Echo as above with EF 50-55% with moderate concentric LVH, moderate decreased RVSF. Not on a diuretic at baseline.   - Monitor volume status    - 2 gram sodium restricted diet      Hypertension  Not on any medications for BP control   - Monitor     Hyperlipidemia  Continue prior to admission simvastatin      Moderate persistent asthma  - Currently asymptomatic.  - Continue PTA duo nebs QID and Pulmicort BID     Chronic kidney disease stage III  Baseline creatinine is in the 1.4-1.7 range.   - Creatinine stable and at baseline      Senile hearing loss  Uses ASL to communicate   -  used for communication     Diet: 2 Gram Sodium Diet  NPO per Anesthesia Guidelines for Procedure/Surgery Except for: Meds    DVT Prophylaxis: Anticoagulation as above  Pak Catheter: not present  Code Status: DNR/DNI      Disposition Plan   Expected discharge: Possible  discharge 5/30/20 if hemoglobin stable   Entered: Gary Alonso MD 05/29/2020, 10:43 AM       The patient's care was discussed with the Bedside Nurse, Patient and Gastroenterology Team.    Gary Alonso MD  Hospitalist Service  [facility]    ______________________________________________________________________    Interval History   No acute events overnight.  Having tan stools mixed with dark brown, no clear melena or blood. Had some heartburn yesterday relieved with Tums. Ambulating without chest pain or shortness of breath.     Data reviewed today: I reviewed all medications, new labs and imaging results over the last 24 hours. I personally reviewed no images or EKG's today.    Physical Exam   Vital Signs: Temp: 98.2  F (36.8  C) Temp src: Oral BP: 94/61 Pulse: 60 Heart Rate: 63 Resp: 16 SpO2: 96 % O2 Device: None (Room air)    Weight: 182 lbs 8.65 oz    Constitutional: Well-appearing, NAD  Respiratory: Clear to auscultation bilaterally, good air movement bilaterally  Cardiovascular: Irregularly irregular, III/VI systolic murmur heard best LUSB. No peripheral edema.  GI: Soft, non-tender, non-distended.   Skin/Integumen: Warm, dry  Other:      Data   Recent Labs   Lab 05/29/20  0718 05/28/20  2227 05/28/20  1347 05/28/20  0602  05/27/20  0658  05/26/20  0605  05/25/20  0442  05/24/20  1827 05/24/20  1446  05/23/20  2104   WBC  --   --   --  2.6*  --  3.2*  --  3.5*  --  3.6*  --   --   --    < > 8.1   HGB 8.2* 8.4* 8.7* 7.3*   < > 8.2*   < > 7.7*   < > 7.4*   < > 7.9* 7.6*   < > 6.7*   MCV  --   --   --  81  --  82  --  80  --  77*  --   --   --    < > 68*   PLT  --   --   --  99*  --  118*  --  123*  --  124*  --   --   --    < > 228   INR  --   --   --   --   --   --   --   --   --   --   --   --   --   --  2.18*   NA  --   --   --  136  --  140  --  141  --  144  --   --   --    < > 137   POTASSIUM  --   --   --  4.1  --  4.2  --  4.1  --  4.4  --   --   --    < > 4.9   CHLORIDE  --   --    --  108  --  112*  --  114*  --  115*  --   --   --    < > 106   CO2  --   --   --  23  --  23  --  22  --  24  --   --   --    < > 23   BUN  --   --   --  26  --  35*  --  51*  --  53*  --   --   --    < > 61*   CR  --   --   --  1.42*  --  1.45*  --  1.31*  --  1.49*  --   --   --    < > 1.62*   ANIONGAP  --   --   --  5  --  5  --  5  --  5  --   --   --    < > 8   AYALA  --   --   --  7.8*  --  8.1*  --  8.0*  --  8.1*  --   --   --    < > 9.3   GLC  --   --   --  97  --  107*  --  115*  --  93  --   --   --    < > 189*   ALBUMIN  --   --   --   --   --   --   --   --   --   --   --   --   --   --  3.3*   PROTTOTAL  --   --   --   --   --   --   --   --   --   --   --   --   --   --  6.3*   BILITOTAL  --   --   --   --   --   --   --   --   --   --   --   --   --   --  0.7   ALKPHOS  --   --   --   --   --   --   --   --   --   --   --   --   --   --  43   ALT  --   --   --   --   --   --   --   --   --   --   --   --   --   --  23   AST  --   --   --   --   --   --   --   --   --   --   --   --   --   --  20   TROPI  --   --   --   --   --   --   --   --   --  0.247*  --  0.367* 0.410*   < >  --     < > = values in this interval not displayed.       No results found for this or any previous visit (from the past 24 hour(s)).  Medications     - MEDICATION INSTRUCTIONS -       - MEDICATION INSTRUCTIONS -         budesonide  0.5 mg Nebulization BID     dabigatran ANTICOAGULANT  150 mg Oral BID     diphenhydrAMINE  25 mg Oral Once     ipratropium - albuterol 0.5 mg/2.5 mg/3 mL  3 mL Nebulization 4x daily     pantoprazole  40 mg Oral BID AC     simvastatin  40 mg Oral At Bedtime     sucralfate  1 g Oral 4x Daily AC & HS

## 2020-05-29 NOTE — PLAN OF CARE
VSS. A&Ox4. Tele Afib CVR. Pain managed w/ tylenol.  LS clear. BS+, BM+.  Voiding. Regular diet/w/ 2gm sodium restriction. Ambulating SBA. Pt deaf, communicates with ASL, able to write and communitate needs when  no present. Hgb of 8.2; recheck at 1800.  CTM

## 2020-05-30 VITALS
BODY MASS INDEX: 29.91 KG/M2 | SYSTOLIC BLOOD PRESSURE: 109 MMHG | WEIGHT: 182.54 LBS | RESPIRATION RATE: 14 BRPM | DIASTOLIC BLOOD PRESSURE: 61 MMHG | TEMPERATURE: 98 F | OXYGEN SATURATION: 98 % | HEART RATE: 70 BPM

## 2020-05-30 LAB
CREAT SERPL-MCNC: 1.32 MG/DL (ref 0.66–1.25)
ERYTHROCYTE [DISTWIDTH] IN BLOOD BY AUTOMATED COUNT: 22.8 % (ref 10–15)
GFR SERPL CREATININE-BSD FRML MDRD: 49 ML/MIN/{1.73_M2}
HCT VFR BLD AUTO: 26.6 % (ref 40–53)
HGB BLD-MCNC: 8.3 G/DL (ref 13.3–17.7)
MCH RBC QN AUTO: 25.5 PG (ref 26.5–33)
MCHC RBC AUTO-ENTMCNC: 31.2 G/DL (ref 31.5–36.5)
MCV RBC AUTO: 82 FL (ref 78–100)
PLATELET # BLD AUTO: 101 10E9/L (ref 150–450)
RBC # BLD AUTO: 3.25 10E12/L (ref 4.4–5.9)
WBC # BLD AUTO: 2.7 10E9/L (ref 4–11)

## 2020-05-30 PROCEDURE — 85018 HEMOGLOBIN: CPT | Performed by: INTERNAL MEDICINE

## 2020-05-30 PROCEDURE — 36415 COLL VENOUS BLD VENIPUNCTURE: CPT | Performed by: INTERNAL MEDICINE

## 2020-05-30 PROCEDURE — 94640 AIRWAY INHALATION TREATMENT: CPT

## 2020-05-30 PROCEDURE — 25000132 ZZH RX MED GY IP 250 OP 250 PS 637: Performed by: INTERNAL MEDICINE

## 2020-05-30 PROCEDURE — 25000132 ZZH RX MED GY IP 250 OP 250 PS 637: Performed by: PHYSICIAN ASSISTANT

## 2020-05-30 PROCEDURE — 25000132 ZZH RX MED GY IP 250 OP 250 PS 637: Performed by: HOSPITALIST

## 2020-05-30 PROCEDURE — 85027 COMPLETE CBC AUTOMATED: CPT | Performed by: INTERNAL MEDICINE

## 2020-05-30 PROCEDURE — 99239 HOSP IP/OBS DSCHRG MGMT >30: CPT | Performed by: INTERNAL MEDICINE

## 2020-05-30 PROCEDURE — 82565 ASSAY OF CREATININE: CPT | Performed by: INTERNAL MEDICINE

## 2020-05-30 PROCEDURE — 40000275 ZZH STATISTIC RCP TIME EA 10 MIN

## 2020-05-30 PROCEDURE — 25000125 ZZHC RX 250: Performed by: HOSPITALIST

## 2020-05-30 RX ORDER — PANTOPRAZOLE SODIUM 40 MG/1
40 TABLET, DELAYED RELEASE ORAL
Qty: 60 TABLET | Refills: 1 | Status: SHIPPED | OUTPATIENT
Start: 2020-05-30 | End: 2020-07-27

## 2020-05-30 RX ADMIN — BUDESONIDE 0.5 MG: 0.5 INHALANT RESPIRATORY (INHALATION) at 07:53

## 2020-05-30 RX ADMIN — PANTOPRAZOLE SODIUM 40 MG: 40 TABLET, DELAYED RELEASE ORAL at 08:56

## 2020-05-30 RX ADMIN — SUCRALFATE 1 G: 1 SUSPENSION ORAL at 08:56

## 2020-05-30 RX ADMIN — IPRATROPIUM BROMIDE AND ALBUTEROL SULFATE 3 ML: .5; 3 SOLUTION RESPIRATORY (INHALATION) at 07:53

## 2020-05-30 RX ADMIN — DABIGATRAN ETEXILATE MESYLATE 150 MG: 150 CAPSULE ORAL at 08:56

## 2020-05-30 ASSESSMENT — ACTIVITIES OF DAILY LIVING (ADL)
ADLS_ACUITY_SCORE: 12
ADLS_ACUITY_SCORE: 14
ADLS_ACUITY_SCORE: 12

## 2020-05-30 NOTE — PLAN OF CARE
VSS. A&Ox4. Tele Afib CVR. Pain managed w/ tylenol.  LS clear. BS+, BM+.  Voiding. Regular diet/w/ 2gm sodium restriction. Ambulating SBA. Pt deaf, communicates with ASL, able to write and communitate needs when  no present. Hgb of 8.3 this a.m.  No further tests scheduled.  Pt. Educated on discharge orders with  present.  Pt. Given one prescription for home.  Pt. Discharged to home with son.

## 2020-05-30 NOTE — PLAN OF CARE
Pt is A+Ox4, VSS. Pt is hard of hearing / deaf and uses . Able to communicate with simple gestures and pen and paper overnight. Pt is pleasant and cooperative. Voiding adequately. Pt had a formed brown stool yesterday evening. Denies pain. Denies nausea.

## 2020-05-30 NOTE — PLAN OF CARE
[id number]. VSS. A/O. Deaf,  helped. SBA. Had a small form brown BM. Denies pain. Tolerating diet. NPO midnight. Calls appropriately for help. Tele chronic Afib/BBB. Last Hbg 9.1

## 2020-05-30 NOTE — PROGRESS NOTES
Patient is clinically stable.  Hemoglobin is stable patient is tolerating p.o. 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  I will recommend the patient to continue on his current plan patient can be discharged.  I will recommend his follow-up visit in GI clinic in about 1 to 2 weeks with repeat hemoglobin and also schedule him as an outpatient video capsule endoscopy.    Ignacio Fournier GI

## 2020-05-30 NOTE — DISCHARGE SUMMARY
[facility]  Hospitalist Discharge Summary       Date of Admission:  5/23/2020  Date of Discharge:  5/30/2020  Discharging Provider: Gary Alonso MD      Discharge Diagnoses   Acute upper gastrointestinal bleed secondary to bleeding esophageal ulcer   Acute blood loss anemia on chronic anemia   COVID-19 ruled out  Coronary artery disease status post CABG in the early 2000's  NSTEMI, type 2 secondary to demand  Pancytopenia  Severe aortic stenosis  Left atrial appendage thrombosis  Atrial fibrillation  Chronic diastolic congestive heart failure  Hypertension  Hyperlipidemia  Moderate persistent asthma  Chronic kidney disease stage III  Senile hearing loss  Non-Severe malnutrition    Follow-ups Needed After Discharge   Follow-up Appointments     Follow-up and recommended labs and tests       Follow up with primary care provider (virtual visit okbrad) in 1 week with   CBC prior. Follow up with gastroenterology as directed. Follow up with   structural cardiology clinic for rescheduling of TAVR once cleared by   primary/GI.             Hospital Course   Shiraz Ingram is an 85 year-old male with complex past medical history including critical aortic stenosis with upcoming plan for TAVR, coronary artery disease with prior bypass, atrial fibrillation with left atrial thrombus, senile hearing loss, chronic anemia, chronic diastolic congestive heart failure, hypertension, hyperlipidemia, moderate persistent asthma, chronic kidney disease stage 3 who was admitted on 5/23/2020 with lightheadedness and melena, found to have acute GI bleed requiring blood transfusion.     Acute upper gastrointestinal bleed secondary to bleeding esophageal ulcer   Acute blood loss anemia on chronic anemia   Presented Lightheaded for for a few days PTA, developed melena on the day of admission. Hemoglobin 6.7, down from 8.4 two days prior to admission and baseline of around 11 earlier this month. Hemoccult positive in the ER.  Hemodynamically stable in the ER, however lactic acid was elevated to 6.0.  EGD 5/24 found bleeding esophageal ulcer treated with cautery and APC after failed injection. RBC tagged study confirms left mid-abdomen bleed on 5/25.  - GI ([place]) consulted, followed during admission   - Continue PPI BID   - Hemoglobin stable and having brown stools with dabigatran restarted, continue on discharge   - Follow hemoglobin closely on discharge   - PT recommends home with assist of son on discharge     COVID-19 ruled out  Asymptomatic COVID-19 testing obtained in anticipation of endoscopy negative.      Coronary artery disease status post CABG in the early 2000's  NSTEMI, type 2 secondary to demand  Asymptomatic. Troponin trend 0.435--0.410--0.367--0.247 on admit. Likely all in setting of demand with acute GI bleed, no chest pain noted  - Hold PTA aspirin on discharge, resume as outpatient if hemoglobin remains stable   - Continue prior to admission simvastatin     Pancytopenia  Anemia as above. Also with thrombocytopenia, likely component of consumption from bleed in context of chronic intermittent thrombocytopenia. Mild leukopenia also developed during admission, unclear etiology though likely due to acute illness as WBC normal on admission. Follow up as outpatient with CBC.      Severe aortic stenosis  Followed by cardiology, has been undergoing TAVR work-up with plans for TAVR.  - Cardiology consult appreciated  - Follow up with structural cardiology clinic once cleared to proceed with TAVR from bleeding standpoint     Left atrial appendage thrombosis  Diagnosed during work-up for TAVR 2/2020. Treated with dabigatran. No clear cut thrombus seen on ALINE 4/2020 though with contrast pattern suggestive of early/pre clot.   - Cardiology consulted during admission, recommended resuming dabigatran as soon as able given high clot risk   - Dabigatran managed as above     Atrial fibrillation  Not on any chronic rate or rhythm  control medications.  Heart rate in the 80s in the ER.  - Dabigatran managed as above      Chronic diastolic congestive heart failure  Echo as above with EF 50-55% with moderate concentric LVH, moderate decreased RVSF. Not on a diuretic at baseline.   - 2 gram sodium restricted diet      Hypertension  Not on any medications for BP control      Hyperlipidemia  Continue prior to admission simvastatin      Moderate persistent asthma  - Currently asymptomatic.  - Continue prior to admission regimen.      Chronic kidney disease stage III  Baseline creatinine is in the 1.4-1.7 range.   - Creatinine stable and at baseline      Senile hearing loss  Uses ASL to communicate   -  used for communication     Non-Severe malnutrition  Nutrition consulted. Encourage supplements.       Consultations This Hospital Stay   GASTROENTEROLOGY IP CONSULT  CARDIOLOGY IP CONSULT  PHYSICAL THERAPY ADULT IP CONSULT    Code Status   DNR/DNI    Time Spent on this Encounter   I, Gary Alonso MD, personally saw the patient today and spent greater than 30 minutes discharging this patient.       Gary Alonso MD  [facility]  ______________________________________________________________________    Physical Exam   Vital Signs: Temp: 98  F (36.7  C) Temp src: Oral BP: 109/61 Pulse: 70 Heart Rate: 59 Resp: 14 SpO2: 98 % O2 Device: None (Room air)    Weight: 182 lbs 8.65 oz    Constitutional: Well-appearing, NAD  Respiratory:     Clear to auscultation bilaterally, good air movement bilaterally  Cardiovascular: Irregularly irregular, III/VI systolic murmur heard best LUSB. No peripheral edema.  GI: Soft, non-tender, non-distended.   Skin/Integumen: Warm, dry  Other:         Primary Care Physician   Dany Rodriguez MD    Discharge Disposition   Discharged to home  Condition at discharge: Stable      Discharge Orders      Reason for your hospital stay    You were hospitalized for bleeding in your gastrointestinal  tract.     Follow-up and recommended labs and tests     Follow up with primary care provider (virtual visit lizy) in 1 week with CBC prior. Follow up with gastroenterology as directed. Follow up with structural cardiology clinic for rescheduling of TAVR once cleared by primary/GI.     Activity    Your activity upon discharge: activity as tolerated     Discharge Instructions    Hold aspirin until you are seen in follow-up by your primary provider and instructed to resume.     DNR/DNI    Per previous documentation.     Diet    Follow this diet upon discharge: 2 gram sodium restricted diet     Discharge Medications   Current Discharge Medication List      START taking these medications    Details   pantoprazole (PROTONIX) 40 MG EC tablet Take 1 tablet (40 mg) by mouth 2 times daily (before meals)  Qty: 60 tablet, Refills: 1    Associated Diagnoses: Ulcer of esophagus with bleeding         CONTINUE these medications which have NOT CHANGED    Details   albuterol (PROAIR HFA) 108 (90 Base) MCG/ACT inhaler INHALE 1 TO 2 PUFFS EVERY 4 TO 6 HOURS AS NEEDED  Qty: 1 Inhaler, Refills: 11    Associated Diagnoses: Moderate persistent asthma, uncomplicated      budesonide (PULMICORT) 0.5 MG/2ML nebulizer solution Take 2 mLs (0.5 mg) by nebulization 2 times daily  Qty: 120 mL, Refills: 0    Associated Diagnoses: Moderate persistent asthma      calcium carbonate 600 mg-vitamin D 400 units (CALTRATE) 600-400 MG-UNIT per tablet Take 1 tablet by mouth daily      dabigatran ANTICOAGULANT (PRADAXA) 150 MG capsule Take 1 capsule (150 mg) by mouth 2 times daily Store in original 's bottle or blister pack; use within 120 days of opening.  Qty: 180 capsule, Refills: 3    Associated Diagnoses: Coronary artery disease involving native coronary artery of native heart without angina pectoris      ferrous sulfate (IRON) 325 (65 Fe) MG tablet TAKE 1 TABLET(325 MG) BY MOUTH TWICE DAILY  Qty: 180 tablet, Refills: 0    Associated  Diagnoses: Anemia, unspecified type      ipratropium - albuterol 0.5 mg/2.5 mg/3 mL (DUONEB) 0.5-2.5 (3) MG/3ML nebulization Inhale 1 vial (3 mLs) into the lungs 4 times daily  Qty: 360 mL, Refills: 11    Associated Diagnoses: Moderate persistent asthma, uncomplicated      meclizine (ANTIVERT) 12.5 MG tablet Take 1 tablet (12.5 mg) by mouth 4 times daily as needed for dizziness  Qty: 30 tablet, Refills: 0      montelukast (SINGULAIR) 10 MG tablet TAKE 1 TABLET(10 MG) BY MOUTH DAILY  Qty: 90 tablet, Refills: 1    Comments: **Patient requests 90 days supply**  Associated Diagnoses: Moderate persistent asthma      multivitamin, therapeutic (THERA-VIT) TABS tablet Take 1 tablet by mouth daily      simvastatin (ZOCOR) 40 MG tablet Take 1 tablet (40 mg) by mouth At Bedtime  Qty: 90 tablet, Refills: 3    Associated Diagnoses: Hyperlipidemia with target LDL less than 100         STOP taking these medications       aspirin (ASA) 81 MG EC tablet Comments:   Reason for Stopping:               Significant Results and Procedures   Most Recent 3 CBC's:  Recent Labs   Lab Test 05/30/20  0619 05/29/20  1837 05/29/20  0718  05/28/20  0602  05/27/20  0658   WBC 2.7*  --   --   --  2.6*  --  3.2*   HGB 8.3* 9.1* 8.2*   < > 7.3*   < > 8.2*   MCV 82  --   --   --  81  --  82   *  --   --   --  99*  --  118*    < > = values in this interval not displayed.     Most Recent 3 BMP's:  Recent Labs   Lab Test 05/30/20  0619 05/28/20  0602 05/27/20  0658 05/26/20  0605   NA  --  136 140 141   POTASSIUM  --  4.1 4.2 4.1   CHLORIDE  --  108 112* 114*   CO2  --  23 23 22   BUN  --  26 35* 51*   CR 1.32* 1.42* 1.45* 1.31*   ANIONGAP  --  5 5 5   AYALA  --  7.8* 8.1* 8.0*   GLC  --  97 107* 115*     Most Recent 2 LFT's:  Recent Labs   Lab Test 05/23/20  2104 05/14/20  0547   AST 20 19   ALT 23 21   ALKPHOS 43 47   BILITOTAL 0.7 0.5     Most Recent 3 INR's:  Recent Labs   Lab Test 05/23/20 2104 04/24/20  0617 04/23/20  0325   INR 2.18* 1.24*  2.35*     Most Recent 3 Troponin's:  Recent Labs   Lab Test 05/25/20  0442 05/24/20  1827 05/24/20  1446  03/16/12  0739   TROPI 0.247* 0.367* 0.410*   < >  --    TROPONIN  --   --   --   --  0.00    < > = values in this interval not displayed.     Most Recent 3 BNP's:  Recent Labs   Lab Test 04/23/20  0325 06/22/19  0346 01/29/17  1810   NTBNPI 8,790* 4,080* 4,211*     Most Recent Cholesterol Panel:  Recent Labs   Lab Test 06/12/19  0818   CHOL 136   LDL 74   HDL 46   TRIG 82     Most Recent 6 Bacteria Isolates From Any Culture (See EPIC Reports for Culture Details):  Recent Labs   Lab Test 01/29/17  1903 01/29/17  1810 02/19/13  2115 02/19/13  2110 03/16/12  0735   CULT No growth No growth No growth No growth No growth     Most Recent TSH and T4:  Recent Labs   Lab Test 02/21/13  1702   TSH 1.66     Most Recent Hemoglobin A1c:No lab results found.  Most Recent Urinalysis:  Recent Labs   Lab Test 05/12/20  1605  11/15/12  0835   COLOR Yellow   < > Yellow   APPEARANCE Clear   < > Clear   URINEGLC Negative   < > Negative   URINEBILI Negative   < > Negative   URINEKETONE Negative   < > Negative   SG 1.009   < > 1.015   UBLD Negative   < > Negative   URINEPH 6.5   < > 7.0   PROTEIN Negative   < > Negative   UROBILINOGEN  --   --  0.2   NITRITE Negative   < > Negative   LEUKEST Negative   < > Negative   RBCU 1   < > O - 2   WBCU 1   < > O - 2    < > = values in this interval not displayed.     Most Recent ABG:No lab results found.  Most Recent ESR & CRP:No lab results found.,   Results for orders placed or performed during the hospital encounter of 05/23/20   CT Abdomen Pelvis w/o Contrast    Narrative    EXAM: CT ABDOMEN PELVIS W/O CONTRAST  LOCATION: [facility]  DATE/TIME: 5/23/2020 9:54 PM    INDICATION: Abdominal pain. Rectal bleeding.  COMPARISON: 02/25/2020  TECHNIQUE: CT scan of the abdomen and pelvis was performed without IV contrast. Multiplanar reformats were obtained. Dose reduction techniques  were used.  CONTRAST: None.    FINDINGS:   LOWER CHEST: Sternotomy. A few small scattered calcified granulomata with a few of the nodules uncalcified. Segmental elevation of the left hemidiaphragm.     HEPATOBILIARY: Cholecystectomy.    PANCREAS: Normal.    SPLEEN: Normal.    ADRENAL GLANDS: Normal.    KIDNEYS/BLADDER: There are multiple (greater than 10), bilateral renal cysts present many of the cysts mildly hypodense compatible with complex cysts. These likely contain proteinaceous debris or hemorrhage.    BOWEL: Normal.    LYMPH NODES: Normal.    VASCULATURE: 2.8 cm infrarenal abdominal aortic ectasia.    PELVIC ORGANS: Mild prostatic enlargement.    MUSCULOSKELETAL: Demineralization. Moderate compression of T12 with kyphosis at this level. Mild compression of superior endplate of L3. Stable L1 compression fracture.      Impression    IMPRESSION:   1.  Multiple bilateral renal cysts many of which are complicated. These likely containing proteinaceous debris or blood products and are similar to the prior exam.    2.  Cholecystectomy.     NM GI Bleed    [facility]    NUCLEAR MEDICINE GI BLEED May 25, 2020 12:33 PM     CLINICAL INFORMATION: GI bleeding.    COMPARISON: CT of the abdomen and pelvis performed 5/23/2020.     TECHNIQUE: The patient's red blood cells were labeled with 27 mCi Tc  99m ultra tagged red blood cells. Injection was into the right arm IV.  Dynamic images were obtained out through 57 minutes.    FINDINGS: On the final several images of this study, a focal area of  radiotracer activity arises in the left midabdomen, suspicious for a  site of active GI bleeding. No other areas of abnormal focal  radiotracer activity are identified. Physiologic radiotracer activity  is noted within the blood vessels and liver.      Impression    IMPRESSION: A focal area of abnormal radiotracer activity is noted in  the left midabdomen on the final several images of this study, and is  suspicious for active GI  bleeding.    CASH RICO MD       Allergies   Allergies   Allergen Reactions     No Known Drug Allergies

## 2020-06-01 ENCOUNTER — HOSPITAL ENCOUNTER (EMERGENCY)
Facility: CLINIC | Age: 85
Discharge: HOME | End: 2020-06-02
Attending: EMERGENCY MEDICINE | Admitting: EMERGENCY MEDICINE
Payer: COMMERCIAL

## 2020-06-01 ENCOUNTER — OFFICE VISIT (OUTPATIENT)
Dept: FAMILY MEDICINE | Facility: CLINIC | Age: 85
End: 2020-06-01
Payer: COMMERCIAL

## 2020-06-01 VITALS
HEIGHT: 66 IN | BODY MASS INDEX: 25.13 KG/M2 | DIASTOLIC BLOOD PRESSURE: 68 MMHG | TEMPERATURE: 97.8 F | WEIGHT: 156.4 LBS | OXYGEN SATURATION: 99 % | HEART RATE: 76 BPM | SYSTOLIC BLOOD PRESSURE: 149 MMHG

## 2020-06-01 DIAGNOSIS — E78.5 HYPERLIPIDEMIA WITH TARGET LDL LESS THAN 100: ICD-10-CM

## 2020-06-01 DIAGNOSIS — K92.1 STOOL COLOR BLACK: ICD-10-CM

## 2020-06-01 DIAGNOSIS — N18.30 CKD (CHRONIC KIDNEY DISEASE) STAGE 3, GFR 30-59 ML/MIN (H): ICD-10-CM

## 2020-06-01 DIAGNOSIS — I10 HYPERTENSION GOAL BP (BLOOD PRESSURE) < 140/90: ICD-10-CM

## 2020-06-01 DIAGNOSIS — K92.2 UPPER GI BLEED: Primary | ICD-10-CM

## 2020-06-01 DIAGNOSIS — I35.0 SEVERE AORTIC STENOSIS: ICD-10-CM

## 2020-06-01 LAB
ABO + RH BLD: NORMAL
ABO + RH BLD: NORMAL
ANION GAP SERPL CALCULATED.3IONS-SCNC: 7 MMOL/L (ref 3–14)
BASOPHILS # BLD AUTO: 0 10E9/L (ref 0–0.2)
BASOPHILS NFR BLD AUTO: 0.3 %
BLD GP AB SCN SERPL QL: NORMAL
BLOOD BANK CMNT PATIENT-IMP: NORMAL
BUN SERPL-MCNC: 26 MG/DL (ref 7–30)
CALCIUM SERPL-MCNC: 8.2 MG/DL (ref 8.5–10.1)
CHLORIDE SERPL-SCNC: 107 MMOL/L (ref 94–109)
CHOLEST SERPL-MCNC: 119 MG/DL
CO2 SERPL-SCNC: 23 MMOL/L (ref 20–32)
CREAT SERPL-MCNC: 1.32 MG/DL (ref 0.66–1.25)
CREAT UR-MCNC: 60 MG/DL
DIFFERENTIAL METHOD BLD: ABNORMAL
EOSINOPHIL # BLD AUTO: 0 10E9/L (ref 0–0.7)
EOSINOPHIL NFR BLD AUTO: 1.4 %
ERYTHROCYTE [DISTWIDTH] IN BLOOD BY AUTOMATED COUNT: 21.7 % (ref 10–15)
ERYTHROCYTE [DISTWIDTH] IN BLOOD BY AUTOMATED COUNT: 22.4 % (ref 10–15)
GFR SERPL CREATININE-BSD FRML MDRD: 49 ML/MIN/{1.73_M2}
GLUCOSE SERPL-MCNC: 123 MG/DL (ref 70–99)
HCT VFR BLD AUTO: 31.2 % (ref 40–53)
HCT VFR BLD AUTO: 33.5 % (ref 40–53)
HDLC SERPL-MCNC: 48 MG/DL
HGB BLD-MCNC: 10.3 G/DL (ref 13.3–17.7)
HGB BLD-MCNC: 9.7 G/DL (ref 13.3–17.7)
IMM GRANULOCYTES # BLD: 0 10E9/L (ref 0–0.4)
IMM GRANULOCYTES NFR BLD: 0 %
LDLC SERPL CALC-MCNC: 54 MG/DL
LYMPHOCYTES # BLD AUTO: 0.5 10E9/L (ref 0.8–5.3)
LYMPHOCYTES NFR BLD AUTO: 17.7 %
MCH RBC QN AUTO: 25.4 PG (ref 26.5–33)
MCH RBC QN AUTO: 25.4 PG (ref 26.5–33)
MCHC RBC AUTO-ENTMCNC: 30.7 G/DL (ref 31.5–36.5)
MCHC RBC AUTO-ENTMCNC: 31.1 G/DL (ref 31.5–36.5)
MCV RBC AUTO: 82 FL (ref 78–100)
MCV RBC AUTO: 83 FL (ref 78–100)
MICROALBUMIN UR-MCNC: 10 MG/L
MICROALBUMIN/CREAT UR: 17.05 MG/G CR (ref 0–17)
MONOCYTES # BLD AUTO: 0.2 10E9/L (ref 0–1.3)
MONOCYTES NFR BLD AUTO: 6.5 %
NEUTROPHILS # BLD AUTO: 2.2 10E9/L (ref 1.6–8.3)
NEUTROPHILS NFR BLD AUTO: 74.1 %
NONHDLC SERPL-MCNC: 71 MG/DL
NRBC # BLD AUTO: 0 10*3/UL
NRBC BLD AUTO-RTO: 0 /100
PLATELET # BLD AUTO: 148 10E9/L (ref 150–450)
PLATELET # BLD AUTO: 152 10E9/L (ref 150–450)
POTASSIUM SERPL-SCNC: 4.4 MMOL/L (ref 3.4–5.3)
RBC # BLD AUTO: 3.82 10E12/L (ref 4.4–5.9)
RBC # BLD AUTO: 4.05 10E12/L (ref 4.4–5.9)
SODIUM SERPL-SCNC: 137 MMOL/L (ref 133–144)
SPECIMEN EXP DATE BLD: NORMAL
TRIGL SERPL-MCNC: 87 MG/DL
WBC # BLD AUTO: 2.9 10E9/L (ref 4–11)
WBC # BLD AUTO: 3.1 10E9/L (ref 4–11)

## 2020-06-01 PROCEDURE — 86901 BLOOD TYPING SEROLOGIC RH(D): CPT | Performed by: EMERGENCY MEDICINE

## 2020-06-01 PROCEDURE — 36415 COLL VENOUS BLD VENIPUNCTURE: CPT | Performed by: FAMILY MEDICINE

## 2020-06-01 PROCEDURE — 82043 UR ALBUMIN QUANTITATIVE: CPT | Performed by: FAMILY MEDICINE

## 2020-06-01 PROCEDURE — T1013 SIGN LANG/ORAL INTERPRETER: HCPCS | Mod: U3 | Performed by: FAMILY MEDICINE

## 2020-06-01 PROCEDURE — 85027 COMPLETE CBC AUTOMATED: CPT | Performed by: FAMILY MEDICINE

## 2020-06-01 PROCEDURE — 99283 EMERGENCY DEPT VISIT LOW MDM: CPT

## 2020-06-01 PROCEDURE — 99495 TRANSJ CARE MGMT MOD F2F 14D: CPT | Performed by: FAMILY MEDICINE

## 2020-06-01 PROCEDURE — 86850 RBC ANTIBODY SCREEN: CPT | Performed by: EMERGENCY MEDICINE

## 2020-06-01 PROCEDURE — 86900 BLOOD TYPING SEROLOGIC ABO: CPT | Performed by: EMERGENCY MEDICINE

## 2020-06-01 PROCEDURE — 80048 BASIC METABOLIC PNL TOTAL CA: CPT | Performed by: EMERGENCY MEDICINE

## 2020-06-01 PROCEDURE — 85025 COMPLETE CBC W/AUTO DIFF WBC: CPT | Performed by: EMERGENCY MEDICINE

## 2020-06-01 PROCEDURE — 80061 LIPID PANEL: CPT | Performed by: FAMILY MEDICINE

## 2020-06-01 RX ORDER — LISINOPRIL 20 MG/1
TABLET ORAL
Qty: 90 TABLET | Refills: 1 | OUTPATIENT
Start: 2020-06-01

## 2020-06-01 ASSESSMENT — MIFFLIN-ST. JEOR: SCORE: 1333.21

## 2020-06-01 NOTE — TELEPHONE ENCOUNTER
Stopped while inpatient.    Refill Refused.    Amee Whitfield, PharmD  PGY1 Medication Therapy Management Resident  Voicemail: [phone]

## 2020-06-01 NOTE — PROGRESS NOTES
Subjective     Shiraz Ingram is a 85 year old male who presents to clinic today for the following health issues:    Hospitals in [place]     Hospital Follow-up Visit:    Hospital/Nursing Home/IP Rehab Facility: [facility]  Date of Admission: 05/23/2020  Date of Discharge: 05/30/2020  Reason(s) for Admission: Acute upper gastrointestinal bleed secondary to bleeding esophageal ulcer       Was your hospitalization related to COVID-19? No   Problems taking medications regularly:  None  Medication changes since discharge: started taking protonix 40 mg and stopped taking aspirin 81mg  Problems adhering to non-medication therapy:  None    Summary of hospitalization:  [facility] discharge summary reviewed  Diagnostic Tests/Treatments reviewed.  Follow up needed: labs  Other Healthcare Providers Involved in Patient s Care:         specialist  Update since discharge: improved.       Post Discharge Medication Reconciliation: discharge medications reconciled, continue medications without change.  Plan of care communicated with patient              Patient has had multiple hospitalization last month.  On 4/23 -hospitalized with shortness of breath due to his severe aortic stenosis.  On 5/11 -hospitalized with hyperkalemia and hyponatremia  On 5/21 -seen in the emergency room with dizziness  On 5/23 -hospitalized with esophageal ulcer with bleeding    Recovering well.     No dark stool anymore.    Stopped aspirin. Started using pradaxa. Started using protonix.     Lisinopril was stopped due to hyperkalemia.    Metoprolol was stopped due to bradycardia.     Home bp readings are better.     Reviewed and updated as needed this visit by Provider    Review of Systems   Constitutional, HEENT, cardiovascular, pulmonary, gi and gu systems are negative, except as otherwise noted.      Objective    BP (!) 149/68 (BP Location: Left arm, Patient Position: Sitting, Cuff Size: Adult Regular)   Pulse 76   Temp 97.8  F (36.6  C) (Oral)   " Ht 1.67 m (5' 5.75\")   Wt 70.9 kg (156 lb 6.4 oz)   SpO2 99%   BMI 25.44 kg/m    Body mass index is 25.44 kg/m .  Physical Exam   GENERAL: elderly, frail but  alert and no distress  EYES: Eyes grossly normal to inspection, PERRL and conjunctivae and sclerae normal  RESP: lungs clear to auscultation - no rales, rhonchi or wheezes  Hard of hearing  PSYCH: mentation appears normal, affect normal/bright          Assessment & Plan   (K92.2) Upper GI bleed  (primary encounter diagnosis)  Comment: I reviewed his last all recent hospitalization with him.  He was not aware of bleeding ulcer on his esophagus.  We discussed diagnosis and avoid NSAIDs.  He does not use any chronic NSAIDs.  He is currently on PPI.  He stopped baby aspirin.  Currently on Pradaxa.  Repeat hemoglobin today.  Plan: CBC with platelets             (E78.5) Hyperlipidemia with target LDL less than 100  Comment: Along with rest of the labs I am going to obtain his lipid.  Not sure for how long it would be appropriate to continue monitoring especially with his frail age.  Plan: Lipid panel reflex to direct LDL Fasting,         Albumin Random Urine Quantitative with Creat         Ratio             (N18.3) CKD (chronic kidney disease) stage 3, GFR 30-59 ml/min (H)  Comment:    Plan: May need to consider nephrology follow-up but at this point we are going to hold off now.  His hyperkalemia improved.    (I10) Hypertension goal BP (blood pressure) < 140/90  Comment:    Plan: Blood pressure right now is high but his home readings are better.  I think at this point I will continue to hold off on his lisinopril and monitor his blood pressure.    (I35.0) Severe aortic stenosis  Comment:    Plan: Due to his multiple hospitalization his TAVR has been rescheduled.  Now it has been scheduled for early next week.  Further treatment as per cardiology.    I answered all his questions via .    Dany Rodriguez MD, MD  [facility] " [place]

## 2020-06-01 NOTE — ED AVS SNAPSHOT
Emergency Department  [address] [place] [address]  Phone:  [phone]  Fax:  [phone]                                    Shiraz Ingram   MRN: [id number]    Department:   Emergency Department   Date of Visit:  6/1/2020           After Visit Summary Signature Page    I have received my discharge instructions, and my questions have been answered. I have discussed any challenges I see with this plan with the nurse or doctor.    ..........................................................................................................................................  Patient/Patient Representative Signature      ..........................................................................................................................................  Patient Representative Print Name and Relationship to Patient    ..................................................               ................................................  Date                                   Time    ..........................................................................................................................................  Reviewed by Signature/Title    ...................................................              ..............................................  Date                                               Time          22EPIC Rev 08/18

## 2020-06-02 VITALS
DIASTOLIC BLOOD PRESSURE: 75 MMHG | SYSTOLIC BLOOD PRESSURE: 115 MMHG | TEMPERATURE: 98.5 F | HEART RATE: 73 BPM | OXYGEN SATURATION: 98 % | RESPIRATION RATE: 18 BRPM

## 2020-06-02 LAB
HEMOCCULT STL QL: NEGATIVE
HGB BLD-MCNC: 10 G/DL (ref 13.3–17.7)

## 2020-06-02 PROCEDURE — 82272 OCCULT BLD FECES 1-3 TESTS: CPT | Performed by: EMERGENCY MEDICINE

## 2020-06-02 PROCEDURE — 85018 HEMOGLOBIN: CPT | Performed by: EMERGENCY MEDICINE

## 2020-06-02 ASSESSMENT — ENCOUNTER SYMPTOMS
DIZZINESS: 0
BLOOD IN STOOL: 0
LIGHT-HEADEDNESS: 0
ABDOMINAL PAIN: 0

## 2020-06-02 NOTE — ED PROVIDER NOTES
"  History   Chief Complaint  Melena      The history is provided by the patient. The history is limited by a language barrier. A  was used (ASL).      Shiraz Ingram is a 85 year old male with a history of atrial fibrillation on dabigatran who presents for evaluation of one episode of dark stools. History was obtained using an . Per chart review, the patient was discharged Saturday after treatment for a bleed in his stomach. After being discharged, the patient states that he had normal \"yellow\" stool until Monday morning, when he experienced melena. He states that has been feeling fine despite the melena, and denies any hematochezia, dizziness, abdominal pain or lightheadedness. He states that he has been able to walk without any issues, and says that he went on two long walks today (Monday) without issues. He states that he has been taking his medication as prescribed. The patient had laboratory work done this morning, as seen below.    Laboratory results from 06/01/2020 from [facility]   WBC  4.0 - 11.0 10e9/L  3.1Low       Comment:  SLIDE REVIEW DONE NO IMMATURE CELLS SEEN.      RBC Count  4.4 - 5.9 10e12/L  4.05Low       Comment:  SHEA MENSAH, BEBA TRAN.       Hemoglobin  13.3 - 17.7 g/dL  10.3Low         Hematocrit  40.0 - 53.0 %  33.5Low         MCV  78 - 100 fl  83       MCH  26.5 - 33.0 pg  25.4Low         MCHC  31.5 - 36.5 g/dL  30.7Low         RDW  10.0 - 15.0 %  22.4High         Platelet Count  150 - 450 10e9/L  148Low          Allergies  No Known Drug Allergies    Medications  albuterol inhaler  budesonide  dabigatran ANTICOAGULANT  ferrous sulfate  ipratropium - albuterol  meclizine  montelukast  pantoprazole  simvastatin    Past Medical History  Allergic rhinitis, cause unspecified   Anemia, unspecified   Aortic stenosis   Benign neoplasm of colon   CKD (chronic kidney disease) stage 3,  Coronary atherosclerosis of unspecified type of vessel, " native or graft   Hyperlipidemia  Hypertension  Localized osteoarthrosis not specified whether primary or secondary, lower leg   Moderate persistent asthma   Persistent atrial fibrillation   Unspecified hearing loss     Past Surgical History  Coronary artery bypass  Colonoscopy  Angiogram coronary graft  coronary angiogram  Right heart cath  Colonoscopy thru stoma w biopsy x2  Esophagoscopy  herniorrhaphy  Laparoscopic choelcystectomy    Family History  Father - CAD  Mother - Breast Cancer  Son - Cerebrovascular disease, CABG  Brother - Heart disease    Social History  Tobacco use: Former Smoker  Alcohol use: No  Drug use: no  Marital Status:     Review of Systems   Gastrointestinal: Negative for abdominal pain and blood in stool.        Melena   Neurological: Negative for dizziness and light-headedness.   All other systems reviewed and are negative.    Physical Exam     Patient Vitals for the past 24 hrs:   BP Temp Temp src Pulse Heart Rate SpO2   06/02/20 0030 115/75 -- -- 73 -- 98 %   06/02/20 0018 -- -- -- -- -- 98 %   06/02/20 0017 (!) 160/83 -- -- 80 -- --   06/01/20 2223 (!) 164/47 98.5  F (36.9  C) Oral -- 80 96 %       Physical Exam   General: Patient is alert and normal appearing.  HEENT: Head atraumatic    Eyes: pupils equal and reactive. Conjunctiva clear   Nares: patent   Oropharynx: no lesions, uvula midline, no palatal draping, normal voice, no trismus  Neck: Supple without lymphadenopathy, no meningismus  Chest: Heart regular rate and rhythm.   Lungs: Equal clear to auscultation with no wheeze or rales  Abdomen: Soft, non tender, nondistended, normal bowel sounds  Rectal: Black  stool noted on rectal exam no active source of bleeding identified  Back: No costovertebral angle tenderness, no midline C, T or L spine tenderness  Neuro: Grossly nonfocal, normal speech, strength equal bilaterally, CN 2-12 intact  Extremities: No deformities, equal radial and DP pulses. No clubbing, cyanosis.  No  edema  Skin: Warm and dry with no rash.       Emergency Department Course   Laboratory:  Laboratory findings were communicated with the patient who voiced understanding of the findings.    CBC: WBC 2.9(L), HGB 9.7(L) o/w within normal limits ()  BMP: glucose 123(L), creatinine 1.32(H), GFR estimate 49(L), calcium 8.2(L) o/w within normal limits   ABO/Rh type and screen: ABO A, RH(D) positive, Antibody screen negative  Occult blood stool: negative  Hemoglobin: 10.0(L)    Emergency Department Course:  Past medical records, nursing notes, and vitals reviewed.    (1855)   I performed an exam of the patient as documented above.     IV was inserted and blood was drawn for laboratory testing, results above.    Stool sample was obtained and sent for laboratory analysis, findings above.     (0039)   I rechecked the patient.    (6325)   I called the patient's son to update him about the condition of the patient.    Findings and plan explained to the Patient. Patient discharged home with instructions regarding supportive care, medications, and reasons to return. The importance of close follow-up was reviewed.     I personally reviewed the laboratory results with the Patient and answered all related questions prior to discharge.     Impression & Plan   Medical Decision Making:  Patient is an 85-year-old male who presents the emergency department for one episode of black stool tonight.  He was discharged Saturday following acute upper GI bleed secondary to bleeding esophageal ulcer.  This was treated and he was transfused.  Patient went home on iron supplement and Protonix.  He states he has been compliant with his medications.  He restarted his Pradaxa but is not taking aspirin.  Here the patient is found to have black stool but is Hemoccult negative.  His hemoglobin has remained stable since this morning.  I suspect given the Hemoccult is negative and his new iron supplementation that this is the source of his black  stool.  Patient also notes that he has had no difficulty with exercise tolerance, no dizziness, no lightheadedness and no bleeding that he is noted.  Discussed with the patient son as well as the patient through .  All are agreeable with plan for discharge with close follow-up with his primary care provider.  Return precautions the emergency department reviewed at length and all questions and concerns addressed.      Diagnosis:    ICD-10-CM    1. Stool color black  K92.1        Disposition:  Discharged to home.    Discharge Medications:  New Prescriptions    No medications on file       Scribe Disclosure:  IMaurice, am serving as a scribe at 11:51 PM on 6/1/2020 to document services personally performed by Layla Galvin MD based on my observations and the provider's statements to me.     Scribe Disclosure:  INathaniel, am serving as a scribe at 10:55 PM on 6/1/2020 to document services personally performed by Layla Galvin MD based on my observations and the provider's statements to me.          Layla Galvin MD  06/02/20 0200

## 2020-06-02 NOTE — DISCHARGE INSTRUCTIONS
Return for bloody stool, dizziness, decreased exercise tolerance or other concerns.  Follow up with primary care for repeat hemoglobin testing

## 2020-06-02 NOTE — ED TRIAGE NOTES
"Son states patient recently \"had a hole patched in his colon.\" Patient had one dark stool tonight.   "

## 2020-06-03 DIAGNOSIS — I35.0 SEVERE AORTIC STENOSIS: ICD-10-CM

## 2020-06-03 LAB
ERYTHROCYTE [DISTWIDTH] IN BLOOD BY AUTOMATED COUNT: 22.1 % (ref 10–15)
HCT VFR BLD AUTO: 31.3 % (ref 40–53)
HGB BLD-MCNC: 9.6 G/DL (ref 13.3–17.7)
INR PPP: 1.25 (ref 0.86–1.14)
MCH RBC QN AUTO: 25.3 PG (ref 26.5–33)
MCHC RBC AUTO-ENTMCNC: 30.7 G/DL (ref 31.5–36.5)
MCV RBC AUTO: 83 FL (ref 78–100)
PLATELET # BLD AUTO: 168 10E9/L (ref 150–450)
RBC # BLD AUTO: 3.79 10E12/L (ref 4.4–5.9)
WBC # BLD AUTO: 3.8 10E9/L (ref 4–11)

## 2020-06-03 PROCEDURE — 85610 PROTHROMBIN TIME: CPT | Performed by: INTERNAL MEDICINE

## 2020-06-03 PROCEDURE — 86850 RBC ANTIBODY SCREEN: CPT | Performed by: INTERNAL MEDICINE

## 2020-06-03 PROCEDURE — 36415 COLL VENOUS BLD VENIPUNCTURE: CPT | Performed by: INTERNAL MEDICINE

## 2020-06-03 PROCEDURE — 85027 COMPLETE CBC AUTOMATED: CPT | Performed by: INTERNAL MEDICINE

## 2020-06-03 PROCEDURE — T1013 SIGN LANG/ORAL INTERPRETER: HCPCS | Mod: U3 | Performed by: FAMILY MEDICINE

## 2020-06-03 PROCEDURE — 86901 BLOOD TYPING SEROLOGIC RH(D): CPT | Performed by: INTERNAL MEDICINE

## 2020-06-03 PROCEDURE — 86900 BLOOD TYPING SEROLOGIC ABO: CPT | Performed by: INTERNAL MEDICINE

## 2020-06-03 PROCEDURE — 80053 COMPREHEN METABOLIC PANEL: CPT | Performed by: INTERNAL MEDICINE

## 2020-06-04 LAB
ABO + RH BLD: NORMAL
ABO + RH BLD: NORMAL
ALBUMIN SERPL-MCNC: 3.1 G/DL (ref 3.4–5)
ALP SERPL-CCNC: 59 U/L (ref 40–150)
ALT SERPL W P-5'-P-CCNC: 20 U/L (ref 0–70)
ANION GAP SERPL CALCULATED.3IONS-SCNC: 5 MMOL/L (ref 3–14)
AST SERPL W P-5'-P-CCNC: 21 U/L (ref 0–45)
BILIRUB SERPL-MCNC: 0.9 MG/DL (ref 0.2–1.3)
BLD GP AB SCN SERPL QL: NORMAL
BLOOD BANK CMNT PATIENT-IMP: NORMAL
BUN SERPL-MCNC: 23 MG/DL (ref 7–30)
CALCIUM SERPL-MCNC: 7.8 MG/DL (ref 8.5–10.1)
CHLORIDE SERPL-SCNC: 108 MMOL/L (ref 94–109)
CO2 SERPL-SCNC: 25 MMOL/L (ref 20–32)
CREAT SERPL-MCNC: 1.33 MG/DL (ref 0.66–1.25)
GFR SERPL CREATININE-BSD FRML MDRD: 48 ML/MIN/{1.73_M2}
GLUCOSE SERPL-MCNC: 96 MG/DL (ref 70–99)
POTASSIUM SERPL-SCNC: 4.9 MMOL/L (ref 3.4–5.3)
PROT SERPL-MCNC: 6.3 G/DL (ref 6.8–8.8)
SODIUM SERPL-SCNC: 138 MMOL/L (ref 133–144)
SPECIMEN EXP DATE BLD: NORMAL

## 2020-06-08 ENCOUNTER — TELEPHONE (OUTPATIENT)
Dept: FAMILY MEDICINE | Facility: CLINIC | Age: 85
End: 2020-06-08

## 2020-06-08 NOTE — TELEPHONE ENCOUNTER
Dr. Rodriguez-Please advise if you believe patient is stable enough to undergo TAVR?    Writer called [facility] and informed her Dr. Rodriguez on furlough this week and will be available starting 6/15/20.  Per chart review, Dr. Rodriguez aware of plan for patient to have TAVR, but writer cannot speak to whether or not Dr. Rodriguez believes patient is stable enough to undergo this procedure.    Arely verbalized understanding.    Thank you!  BARNEY Mejía, KATHLEENN, RN

## 2020-06-08 NOTE — TELEPHONE ENCOUNTER
Arely from the [facility] is wondering if PCP feels that the patient is stable enough to have a trans catheter aortic valve replacement.  Procedure has not been scheduled, pending approval from PCP.

## 2020-06-08 NOTE — TELEPHONE ENCOUNTER
" Provider: Please review and advise.  1. Writer planned on calling [facility] back to notify them Dr. Rodriguez on furlough this week but he was aware of patient's plan for TAVR.      Per 6/1/20 office visit note:  \"(I35.0) Severe aortic stenosis  Comment:    Plan: Due to his multiple hospitalization his TAVR has been rescheduled.  Now it has been scheduled for early next week.  Further treatment as per cardiology.\"      Thank you!  KATHLEEN VelezN, RN      "

## 2020-06-14 NOTE — TELEPHONE ENCOUNTER
Cardiology and patient both are aware that he is very high risk due to his age and other co morbidities but as long as hemoglobin remains stable, I think he is OK to go for TVAR.

## 2020-06-15 NOTE — TELEPHONE ENCOUNTER
Writer called Arely and relayed message per Dr. Rodriguez.    Arely verbalized understanding and stated TAVR currently scheduled for 7/14/20.  Patient's son aware to contact Arely should patient start to bleed at all and primary care may contact Arely anytime if patient's condition changes.    BARNEY Mejía, KATHLEENN, RN

## 2020-06-24 DIAGNOSIS — I35.0 SEVERE AORTIC STENOSIS: Primary | ICD-10-CM

## 2020-06-25 ENCOUNTER — CARE COORDINATION (OUTPATIENT)
Dept: CARDIOLOGY | Facility: CLINIC | Age: 85
End: 2020-06-25

## 2020-06-25 NOTE — PROGRESS NOTES
Dr. Harkins called to report patient's GI statis is stable.  OK to proceed with TAVR as planned for 7/14/2020.  Will have H&P done 7/13/2020 with Georgette.  The plan is:  1. [employer] valve  2. Approach will be transfemoral  3. Sedation will be GA because will need ALINE to assess for left atrial appendage thrombus.

## 2020-06-29 ENCOUNTER — CARE COORDINATION (OUTPATIENT)
Dept: CARDIOLOGY | Facility: CLINIC | Age: 85
End: 2020-06-29

## 2020-06-29 NOTE — PROGRESS NOTES
Talked with patient's son, Willi about upcoming appointments and TAVR  procedure.  Letter sent with this information.  Will take last dose of Xarelto on 7/10/20.  Start ASA 81 mg 7/11/20.  Plan for TAVR on 7/14/2020.  I spoke with  services and they will make sure someone is here to help interprete for patient.

## 2020-07-03 ENCOUNTER — HOSPITAL ENCOUNTER (INPATIENT)
Facility: CLINIC | Age: 85
LOS: 2 days | Discharge: HOME | DRG: 194 | End: 2020-07-05
Attending: PHYSICIAN ASSISTANT | Admitting: HOSPITALIST
Payer: COMMERCIAL

## 2020-07-03 ENCOUNTER — APPOINTMENT (OUTPATIENT)
Dept: GENERAL RADIOLOGY | Facility: CLINIC | Age: 85
DRG: 194 | End: 2020-07-03
Attending: PHYSICIAN ASSISTANT
Payer: COMMERCIAL

## 2020-07-03 DIAGNOSIS — I48.0 PAROXYSMAL ATRIAL FIBRILLATION (H): ICD-10-CM

## 2020-07-03 DIAGNOSIS — J18.9 PNEUMONIA DUE TO INFECTIOUS ORGANISM, UNSPECIFIED LATERALITY, UNSPECIFIED PART OF LUNG: Primary | ICD-10-CM

## 2020-07-03 DIAGNOSIS — Z86.79 HISTORY OF CORONARY ARTERY DISEASE: ICD-10-CM

## 2020-07-03 DIAGNOSIS — J18.9 PNEUMONIA OF RIGHT LOWER LOBE DUE TO INFECTIOUS ORGANISM: ICD-10-CM

## 2020-07-03 DIAGNOSIS — I50.33 ACUTE ON CHRONIC DIASTOLIC CONGESTIVE HEART FAILURE (H): ICD-10-CM

## 2020-07-03 DIAGNOSIS — I35.0 SEVERE AORTIC STENOSIS: ICD-10-CM

## 2020-07-03 LAB
ALBUMIN SERPL-MCNC: 4 G/DL (ref 3.4–5)
ALP SERPL-CCNC: 79 U/L (ref 40–150)
ALT SERPL W P-5'-P-CCNC: 23 U/L (ref 0–70)
ANION GAP SERPL CALCULATED.3IONS-SCNC: 7 MMOL/L (ref 3–14)
AST SERPL W P-5'-P-CCNC: 21 U/L (ref 0–45)
BASOPHILS # BLD AUTO: 0 10E9/L (ref 0–0.2)
BASOPHILS NFR BLD AUTO: 0.3 %
BILIRUB SERPL-MCNC: 1.4 MG/DL (ref 0.2–1.3)
BUN SERPL-MCNC: 21 MG/DL (ref 7–30)
CALCIUM SERPL-MCNC: 10 MG/DL (ref 8.5–10.1)
CHLORIDE SERPL-SCNC: 106 MMOL/L (ref 94–109)
CO2 SERPL-SCNC: 25 MMOL/L (ref 20–32)
CREAT SERPL-MCNC: 1.29 MG/DL (ref 0.66–1.25)
DIFFERENTIAL METHOD BLD: ABNORMAL
EOSINOPHIL # BLD AUTO: 0.1 10E9/L (ref 0–0.7)
EOSINOPHIL NFR BLD AUTO: 0.7 %
ERYTHROCYTE [DISTWIDTH] IN BLOOD BY AUTOMATED COUNT: 18.7 % (ref 10–15)
GFR SERPL CREATININE-BSD FRML MDRD: 50 ML/MIN/{1.73_M2}
GLUCOSE SERPL-MCNC: 118 MG/DL (ref 70–99)
HCT VFR BLD AUTO: 40 % (ref 40–53)
HGB BLD-MCNC: 12.6 G/DL (ref 13.3–17.7)
IMM GRANULOCYTES # BLD: 0 10E9/L (ref 0–0.4)
IMM GRANULOCYTES NFR BLD: 0.1 %
INTERPRETATION ECG - MUSE: NORMAL
LACTATE BLD-SCNC: 1.4 MMOL/L (ref 0.7–2)
LYMPHOCYTES # BLD AUTO: 0.4 10E9/L (ref 0.8–5.3)
LYMPHOCYTES NFR BLD AUTO: 5.4 %
MCH RBC QN AUTO: 23.4 PG (ref 26.5–33)
MCHC RBC AUTO-ENTMCNC: 31.5 G/DL (ref 31.5–36.5)
MCV RBC AUTO: 74 FL (ref 78–100)
MONOCYTES # BLD AUTO: 0.2 10E9/L (ref 0–1.3)
MONOCYTES NFR BLD AUTO: 2.4 %
NEUTROPHILS # BLD AUTO: 7 10E9/L (ref 1.6–8.3)
NEUTROPHILS NFR BLD AUTO: 91.1 %
NRBC # BLD AUTO: 0 10*3/UL
NRBC BLD AUTO-RTO: 0 /100
NT-PROBNP SERPL-MCNC: 6814 PG/ML (ref 0–1800)
PLATELET # BLD AUTO: 133 10E9/L (ref 150–450)
POTASSIUM SERPL-SCNC: 4.1 MMOL/L (ref 3.4–5.3)
PROT SERPL-MCNC: 8.1 G/DL (ref 6.8–8.8)
RBC # BLD AUTO: 5.38 10E12/L (ref 4.4–5.9)
SARS-COV-2 PCR COMMENT: NORMAL
SARS-COV-2 RNA SPEC QL NAA+PROBE: NEGATIVE
SARS-COV-2 RNA SPEC QL NAA+PROBE: NORMAL
SODIUM SERPL-SCNC: 138 MMOL/L (ref 133–144)
SPECIMEN SOURCE: NORMAL
SPECIMEN SOURCE: NORMAL
TROPONIN I SERPL-MCNC: 0.09 UG/L (ref 0–0.04)
TROPONIN I SERPL-MCNC: 0.24 UG/L (ref 0–0.04)
WBC # BLD AUTO: 7.6 10E9/L (ref 4–11)

## 2020-07-03 PROCEDURE — 25000125 ZZHC RX 250: Performed by: HOSPITALIST

## 2020-07-03 PROCEDURE — 25000132 ZZH RX MED GY IP 250 OP 250 PS 637: Performed by: HOSPITALIST

## 2020-07-03 PROCEDURE — C9803 HOPD COVID-19 SPEC COLLECT: HCPCS

## 2020-07-03 PROCEDURE — 85025 COMPLETE CBC W/AUTO DIFF WBC: CPT | Performed by: PHYSICIAN ASSISTANT

## 2020-07-03 PROCEDURE — 84484 ASSAY OF TROPONIN QUANT: CPT | Performed by: PHYSICIAN ASSISTANT

## 2020-07-03 PROCEDURE — 96365 THER/PROPH/DIAG IV INF INIT: CPT

## 2020-07-03 PROCEDURE — 87040 BLOOD CULTURE FOR BACTERIA: CPT | Performed by: PHYSICIAN ASSISTANT

## 2020-07-03 PROCEDURE — 71045 X-RAY EXAM CHEST 1 VIEW: CPT

## 2020-07-03 PROCEDURE — 80053 COMPREHEN METABOLIC PANEL: CPT | Performed by: PHYSICIAN ASSISTANT

## 2020-07-03 PROCEDURE — 84484 ASSAY OF TROPONIN QUANT: CPT | Performed by: HOSPITALIST

## 2020-07-03 PROCEDURE — 99285 EMERGENCY DEPT VISIT HI MDM: CPT | Mod: 25

## 2020-07-03 PROCEDURE — 25000132 ZZH RX MED GY IP 250 OP 250 PS 637: Performed by: PHYSICIAN ASSISTANT

## 2020-07-03 PROCEDURE — 36415 COLL VENOUS BLD VENIPUNCTURE: CPT | Performed by: HOSPITALIST

## 2020-07-03 PROCEDURE — 83880 ASSAY OF NATRIURETIC PEPTIDE: CPT | Performed by: PHYSICIAN ASSISTANT

## 2020-07-03 PROCEDURE — 83605 ASSAY OF LACTIC ACID: CPT | Performed by: PHYSICIAN ASSISTANT

## 2020-07-03 PROCEDURE — 96375 TX/PRO/DX INJ NEW DRUG ADDON: CPT

## 2020-07-03 PROCEDURE — 12000011 ZZH R&B MS OVERFLOW

## 2020-07-03 PROCEDURE — 96366 THER/PROPH/DIAG IV INF ADDON: CPT

## 2020-07-03 PROCEDURE — 99223 1ST HOSP IP/OBS HIGH 75: CPT | Mod: AI | Performed by: HOSPITALIST

## 2020-07-03 PROCEDURE — 94640 AIRWAY INHALATION TREATMENT: CPT

## 2020-07-03 PROCEDURE — 25000128 H RX IP 250 OP 636: Performed by: HOSPITALIST

## 2020-07-03 PROCEDURE — 25000128 H RX IP 250 OP 636: Performed by: PHYSICIAN ASSISTANT

## 2020-07-03 PROCEDURE — 93005 ELECTROCARDIOGRAM TRACING: CPT

## 2020-07-03 RX ORDER — ALBUTEROL SULFATE 90 UG/1
2 AEROSOL, METERED RESPIRATORY (INHALATION) EVERY 4 HOURS PRN
Status: DISCONTINUED | OUTPATIENT
Start: 2020-07-03 | End: 2020-07-05 | Stop reason: HOSPADM

## 2020-07-03 RX ORDER — LISINOPRIL 20 MG/1
20 TABLET ORAL DAILY
Status: DISCONTINUED | OUTPATIENT
Start: 2020-07-04 | End: 2020-07-05 | Stop reason: HOSPADM

## 2020-07-03 RX ORDER — METOPROLOL TARTRATE 50 MG
50 TABLET ORAL DAILY
Status: ON HOLD | COMMUNITY
End: 2020-07-05

## 2020-07-03 RX ORDER — FUROSEMIDE 10 MG/ML
40 INJECTION INTRAMUSCULAR; INTRAVENOUS ONCE
Status: COMPLETED | OUTPATIENT
Start: 2020-07-03 | End: 2020-07-03

## 2020-07-03 RX ORDER — DABIGATRAN ETEXILATE 150 MG/1
150 CAPSULE ORAL 2 TIMES DAILY
Status: DISCONTINUED | OUTPATIENT
Start: 2020-07-03 | End: 2020-07-04

## 2020-07-03 RX ORDER — ONDANSETRON 2 MG/ML
4 INJECTION INTRAMUSCULAR; INTRAVENOUS EVERY 6 HOURS PRN
Status: DISCONTINUED | OUTPATIENT
Start: 2020-07-03 | End: 2020-07-05 | Stop reason: HOSPADM

## 2020-07-03 RX ORDER — AZITHROMYCIN 500 MG/1
500 INJECTION, POWDER, LYOPHILIZED, FOR SOLUTION INTRAVENOUS ONCE
Status: DISCONTINUED | OUTPATIENT
Start: 2020-07-03 | End: 2020-07-03

## 2020-07-03 RX ORDER — ACETAMINOPHEN 500 MG
1000 TABLET ORAL ONCE
Status: COMPLETED | OUTPATIENT
Start: 2020-07-03 | End: 2020-07-03

## 2020-07-03 RX ORDER — MULTIVITAMIN,THERAPEUTIC
1 TABLET ORAL EVERY EVENING
Status: DISCONTINUED | OUTPATIENT
Start: 2020-07-03 | End: 2020-07-05 | Stop reason: HOSPADM

## 2020-07-03 RX ORDER — PIPERACILLIN SODIUM, TAZOBACTAM SODIUM 3; .375 G/15ML; G/15ML
3.38 INJECTION, POWDER, LYOPHILIZED, FOR SOLUTION INTRAVENOUS EVERY 6 HOURS
Status: DISCONTINUED | OUTPATIENT
Start: 2020-07-04 | End: 2020-07-05 | Stop reason: HOSPADM

## 2020-07-03 RX ORDER — SIMVASTATIN 40 MG
40 TABLET ORAL AT BEDTIME
Status: DISCONTINUED | OUTPATIENT
Start: 2020-07-03 | End: 2020-07-05 | Stop reason: HOSPADM

## 2020-07-03 RX ORDER — ALBUTEROL SULFATE 90 UG/1
6 AEROSOL, METERED RESPIRATORY (INHALATION) EVERY 6 HOURS PRN
Status: DISCONTINUED | OUTPATIENT
Start: 2020-07-03 | End: 2020-07-03

## 2020-07-03 RX ORDER — NALOXONE HYDROCHLORIDE 0.4 MG/ML
.1-.4 INJECTION, SOLUTION INTRAMUSCULAR; INTRAVENOUS; SUBCUTANEOUS
Status: DISCONTINUED | OUTPATIENT
Start: 2020-07-03 | End: 2020-07-05 | Stop reason: HOSPADM

## 2020-07-03 RX ORDER — MONTELUKAST SODIUM 10 MG/1
10 TABLET ORAL AT BEDTIME
Status: DISCONTINUED | OUTPATIENT
Start: 2020-07-03 | End: 2020-07-05 | Stop reason: HOSPADM

## 2020-07-03 RX ORDER — FERROUS SULFATE 325(65) MG
325 TABLET ORAL 2 TIMES DAILY
Status: DISCONTINUED | OUTPATIENT
Start: 2020-07-03 | End: 2020-07-05 | Stop reason: HOSPADM

## 2020-07-03 RX ORDER — ALBUTEROL SULFATE 90 UG/1
2 AEROSOL, METERED RESPIRATORY (INHALATION) 4 TIMES DAILY
Status: DISCONTINUED | OUTPATIENT
Start: 2020-07-03 | End: 2020-07-05 | Stop reason: HOSPADM

## 2020-07-03 RX ORDER — LIDOCAINE 40 MG/G
CREAM TOPICAL
Status: DISCONTINUED | OUTPATIENT
Start: 2020-07-03 | End: 2020-07-05 | Stop reason: HOSPADM

## 2020-07-03 RX ORDER — CEFTRIAXONE 1 G/1
1 INJECTION, POWDER, FOR SOLUTION INTRAMUSCULAR; INTRAVENOUS ONCE
Status: DISCONTINUED | OUTPATIENT
Start: 2020-07-03 | End: 2020-07-03

## 2020-07-03 RX ORDER — PIPERACILLIN SODIUM, TAZOBACTAM SODIUM 4; .5 G/20ML; G/20ML
4.5 INJECTION, POWDER, LYOPHILIZED, FOR SOLUTION INTRAVENOUS ONCE
Status: COMPLETED | OUTPATIENT
Start: 2020-07-03 | End: 2020-07-03

## 2020-07-03 RX ORDER — ACETAMINOPHEN 325 MG/1
650 TABLET ORAL EVERY 4 HOURS PRN
Status: DISCONTINUED | OUTPATIENT
Start: 2020-07-03 | End: 2020-07-05 | Stop reason: HOSPADM

## 2020-07-03 RX ORDER — PANTOPRAZOLE SODIUM 40 MG/1
40 TABLET, DELAYED RELEASE ORAL
Status: DISCONTINUED | OUTPATIENT
Start: 2020-07-04 | End: 2020-07-05 | Stop reason: HOSPADM

## 2020-07-03 RX ORDER — LISINOPRIL 20 MG/1
20 TABLET ORAL DAILY
Status: ON HOLD | COMMUNITY
End: 2020-07-16

## 2020-07-03 RX ORDER — ONDANSETRON 4 MG/1
4 TABLET, ORALLY DISINTEGRATING ORAL EVERY 6 HOURS PRN
Status: DISCONTINUED | OUTPATIENT
Start: 2020-07-03 | End: 2020-07-05 | Stop reason: HOSPADM

## 2020-07-03 RX ORDER — AZITHROMYCIN 500 MG/1
500 INJECTION, POWDER, LYOPHILIZED, FOR SOLUTION INTRAVENOUS ONCE
Status: COMPLETED | OUTPATIENT
Start: 2020-07-03 | End: 2020-07-03

## 2020-07-03 RX ADMIN — MONTELUKAST 10 MG: 10 TABLET, FILM COATED ORAL at 22:11

## 2020-07-03 RX ADMIN — FUROSEMIDE 40 MG: 10 INJECTION, SOLUTION INTRAMUSCULAR; INTRAVENOUS at 17:16

## 2020-07-03 RX ADMIN — FERROUS SULFATE TAB 325 MG (65 MG ELEMENTAL FE) 325 MG: 325 (65 FE) TAB at 20:40

## 2020-07-03 RX ADMIN — SIMVASTATIN 40 MG: 40 TABLET, FILM COATED ORAL at 22:10

## 2020-07-03 RX ADMIN — THERA TABS 1 TABLET: TAB at 20:40

## 2020-07-03 RX ADMIN — PIPERACILLIN SODIUM AND TAZOBACTAM SODIUM 4.5 G: 4; .5 INJECTION, POWDER, LYOPHILIZED, FOR SOLUTION INTRAVENOUS at 17:29

## 2020-07-03 RX ADMIN — ALBUTEROL SULFATE 6 PUFF: 90 AEROSOL, METERED RESPIRATORY (INHALATION) at 16:39

## 2020-07-03 RX ADMIN — ALBUTEROL SULFATE 2 PUFF: 90 AEROSOL, METERED RESPIRATORY (INHALATION) at 22:10

## 2020-07-03 RX ADMIN — AZITHROMYCIN MONOHYDRATE 500 MG: 500 INJECTION, POWDER, LYOPHILIZED, FOR SOLUTION INTRAVENOUS at 20:40

## 2020-07-03 RX ADMIN — ACETAMINOPHEN 1000 MG: 500 TABLET, FILM COATED ORAL at 17:14

## 2020-07-03 RX ADMIN — IPRATROPIUM BROMIDE AND ALBUTEROL 1 PUFF: 20; 100 SPRAY, METERED RESPIRATORY (INHALATION) at 20:41

## 2020-07-03 RX ADMIN — DABIGATRAN ETEXILATE MESYLATE 150 MG: 150 CAPSULE ORAL at 20:40

## 2020-07-03 ASSESSMENT — ACTIVITIES OF DAILY LIVING (ADL): ADLS_ACUITY_SCORE: 12

## 2020-07-03 ASSESSMENT — ENCOUNTER SYMPTOMS
SHORTNESS OF BREATH: 1
FEVER: 1
COUGH: 1

## 2020-07-03 ASSESSMENT — MIFFLIN-ST. JEOR: SCORE: 1367.11

## 2020-07-03 NOTE — H&P
[facility]    History and Physical  Hospitalist       Date of Admission:  7/3/2020    Assessment & Plan   Shiraz Ingram is a 85 year old male with a complex past medical history including critical aortic stenosis, coronary artery disease, atrial fibrillation, left atrial thrombus, recent GI bleed secondary to bleeding esophageal ulcer, senile hearing loss, chronic anemia, chronic diastolic congestive heart failure, hypertension, hyperlipidemia, moderate persistent asthma, and chronic kidney disease stage III who presented to the ER with fevers, cough, and shortness of breath.  After evaluation in the ER, there was concern for pneumonia.  He was started on antibiotics and admitted to the hospitalist service for further evaluation and management.    Community acquired pneumonia  - Increased risk of gram negative and multi-drug resistant organisms as this is his fourth admission to the hospital in the past 90 days.  - COVID-19 testing obtained in the ER as noted below.  - CXR whows right mid lung and left lung base opacities consistent with pneumonia, as well as small bilateral pleural effusions.  - Received a dose of Zosyn in the ER, will continue the same.  - Sputum culture if able.  - Blood cultures pending.  - Lactic acid within normal limits.  - Sats are currently in the mid 90s on 2 lpm of supplemental oxygen, wean as tolerated.    COVID-19 testing  - COVID-19 testing was obtained in the ER.  - He has been basically quarantining at home. He does live with several family members, however they do not have any known COVID-19 exposures.  - Based on his history, he appears to be low risk for COVID-19. His symptoms can be explained by bacterial community acquired pneumonia. If COVID-19 testing is negative, I would discontinue isolation.    Severe aortic stenosis  -Scheduled for TAVR on July 7 per report from patient's son. Patient's son states he is already called the clinic to let them know about the  patient's admission.  I will also update cardiology tomorrow.  -Monitor volume status closely while he is in the hospital.    Left atrial appendage thrombosis  -Continue PTA Pradaxa.    Atrial fibrillation  -Not on any chronic rate or rhythm control medications.  Rate currently in the 80s ER.  -Continue PTA Pradaxa.    Chronic diastolic congestive heart failure  -Most recent echo in April 2020 showed an LVEF of 50 to 55%, moderate left ventricular hypertrophy, critical aortic stenosis, moderately decreased right ventricular systolic function.  -Appears to be close to euvolemic.  Chronic lower extremity edema is at baseline.  Weight has been stable.  -He was given a dose of IV Lasix in the ER, will hold off on further Lasix dosing for now.  -Monitor I&O and daily weights.    Elevated troponin  -Troponin slightly elevated at 0.088.  -Has had some slight chest discomfort with coughing, otherwise no chest pain.  -Suspicion for ACS is low.  -Will trend troponins.    Recent upper GI bleed secondary to bleeding esophageal ulcer  -Was recently admitted from May 23 through May 30 due to an upper GI bleed from a bleeding esophageal ulcer.  -Continue PTA Protonix.    Hypertension  -Not on any chronic medications for blood pressure control.    Hyperlipidemia  -Continue PTA simvastatin.    Moderate persistent asthma  -No wheezes on exam today, does not appear to be in acute exacerbation.  -Will transition his PTA nebulizers over to inhalers while he is being evaluated for COVID.  If COVID negative, will resume his PTA nebulizers.    Chronic kidney disease, stage III  -Baseline creatinine is in the 1.4-1.7 range.  -Creatinine 1.29 in the ER.  -Recheck labs in a.m.    Senile hearing loss  -He uses American sign language to communicate.  -His son interpreted for him in the ER today.    DVT Prophylaxis: Pradaxa  Code Status: Full Code  Expected discharge: 2 - 3 days, recommended to prior living arrangement once pneumonia adequately  treated.    Arcadio Alford MD    Primary Care Physician   Dany Rodriguez MD    Chief Complaint   Fever, cough, shortness of breath    History is obtained from the patient's through his son, who interpreted for the patient    History of Present Illness   Shiraz Ingram is a 85 year old male with a complex past medical history including critical aortic stenosis, coronary artery disease, atrial fibrillation, left atrial thrombus, recent GI bleed secondary to bleeding esophageal ulcer, senile hearing loss, chronic anemia, chronic diastolic congestive heart failure, hypertension, hyperlipidemia, moderate persistent asthma, and chronic kidney disease stage III who presented to the ER with fevers, cough, and shortness of breath.  H his son acted as an  for him in the room today.  Apparently Mr. Ingram began coughing last night around midnight.  He also developed some shortness of breath and had a fever.  He has had some occasional chest discomfort when he coughs but not at rest.  The patient's son states this is very similar to a previous episode of pneumonia and last time when they waited to see how things would go at home he deteriorated quite quickly.  Therefore they brought him into the hospital today at the first sign of possible pneumonia to get him evaluated.    In the ER, he was evaluated by Daphne Mcmahon PA-C.  Vitals revealed mildly elevated blood pressure, temperature of 101.2, and mild hypoxia on room air.  Labs were fairly unremarkable except for a BNP of 6814 and a troponin of 0.088.  Chest x-ray revealed a right midlung opacity in the left lung base opacity, concerning for pneumonia.  He was started on IV Zosyn in the ER.  The hospitalist service was contacted for admission to the hospital for further evaluation and management.    He has basically been quarantining at home recently.  He did leave the house to go to a gas station yesterday to buy [employer] ticket.  He does live with  several family members.  They deny any known exposure to COVID-19.  He has not had any nausea, abdominal pain, or diarrhea.  No urinary symptoms.  He was admitted to [facility] in May due to an upper GI bleed.    Past Medical History    I have reviewed this patient's medical history and updated it with pertinent information if needed.   Past Medical History:   Diagnosis Date     Allergic rhinitis, cause unspecified      Anemia, unspecified      Aortic stenosis     severe     Benign neoplasm of colon 1999    Ademonatous polyp     CKD (chronic kidney disease) stage 3, GFR 30-59 ml/min (H) 5/12/2011     Coronary atherosclerosis of unspecified type of vessel, native or graft     s/p CABG 2002     Hyperlipidemia LDL goal < 100      Hypertension goal BP (blood pressure) < 140/90      Localized osteoarthrosis not specified whether primary or secondary, lower leg     left knee     Moderate persistent asthma      Persistent atrial fibrillation (H)      Unspecified hearing loss      Past Surgical History   I have reviewed this patient's surgical history and updated it with pertinent information if needed.  Past Surgical History:   Procedure Laterality Date     C NONSPECIFIC PROCEDURE  5/02    Coronary artery bypass     CARDIAC SURGERY       COLONOSCOPY N/A 5/26/2020    Procedure: COLONOSCOPY;  Surgeon: Ignacio Fournier MD;  Location:  GI     CV ANGIOGRAM CORONARY GRAFT N/A 4/24/2020    Procedure: Angiogram Coronary Graft;  Surgeon: Addison Willard MD;  Location:  HEART CARDIAC CATH LAB     CV CORONARY ANGIOGRAM N/A 4/24/2020    Procedure: Coronary Angiogram;  Surgeon: Addison Willard MD;  Location:  HEART CARDIAC CATH LAB     CV RIGHT HEART CATH N/A 4/24/2020    Procedure: Right Heart Cath;  Surgeon: Addison Willard MD;  Location:  HEART CARDIAC CATH LAB     HC COLONOSCOPY THRU STOMA W BIOPSY/CAUTERY TUMOR/POLYP/LESION  5/03    Dr. Dow, Q 5 years     HC COLONOSCOPY THRU  STOMA W BIOPSY/CAUTERY TUMOR/POLYP/LESION  12/199    Dr. Dow, one adenomatous polyp     HC ESOPHAGOSCOPY, DIAGNOSTIC  5/03    Dr. Dow, lower esophageal ring & mild gastritis     HERNIORRHAPHY INGUINAL Right 9/26/2016    Procedure: HERNIORRHAPHY INGUINAL;  Surgeon: Alexis Alexandra MD;  Location: [facility]     LAPAROSCOPIC CHOLECYSTECTOMY  12/03    for biliary sludge     Prior to Admission Medications   Prior to Admission Medications   Prescriptions Last Dose Informant Patient Reported? Taking?   albuterol (PROAIR HFA) 108 (90 Base) MCG/ACT inhaler  Self No Yes   Sig: INHALE 1 TO 2 PUFFS EVERY 4 TO 6 HOURS AS NEEDED   budesonide (PULMICORT) 0.5 MG/2ML nebulizer solution 7/3/2020 at am Self No Yes   Sig: Take 2 mLs (0.5 mg) by nebulization 2 times daily   calcium carbonate 600 mg-vitamin D 400 units (CALTRATE) 600-400 MG-UNIT per tablet 7/3/2020 at am Self Yes Yes   Sig: Take 1 tablet by mouth daily   dabigatran ANTICOAGULANT (PRADAXA) 150 MG capsule 7/3/2020 at x1 Self No Yes   Sig: Take 1 capsule (150 mg) by mouth 2 times daily Store in original 's bottle or blister pack; use within 120 days of opening.   ferrous sulfate (IRON) 325 (65 Fe) MG tablet 7/3/2020 at x1 Self No Yes   Sig: TAKE 1 TABLET(325 MG) BY MOUTH TWICE DAILY   ipratropium - albuterol 0.5 mg/2.5 mg/3 mL (DUONEB) 0.5-2.5 (3) MG/3ML nebulization 7/3/2020 at x2 Self No Yes   Sig: Inhale 1 vial (3 mLs) into the lungs 4 times daily   lisinopril (ZESTRIL) 20 MG tablet 7/3/2020 at am Self Yes Yes   Sig: Take 20 mg by mouth daily   montelukast (SINGULAIR) 10 MG tablet 7/2/2020 at hs Self No Yes   Sig: TAKE 1 TABLET(10 MG) BY MOUTH DAILY   multivitamin, therapeutic (THERA-VIT) TABS tablet 7/2/2020 at Unknown time Self Yes Yes   Sig: Take 1 tablet by mouth every evening    pantoprazole (PROTONIX) 40 MG EC tablet 7/3/2020 at x1 Self No Yes   Sig: Take 1 tablet (40 mg) by mouth 2 times daily (before meals)   simvastatin (ZOCOR) 40 MG tablet  2020 at  Self No Yes   Sig: TAKE 1 TABLET(40 MG) BY MOUTH AT BEDTIME      Facility-Administered Medications: None     Allergies   Allergies   Allergen Reactions     No Known Drug Allergies      Social History   I have reviewed this patient's social history and updated it with pertinent information if needed. Shiraz Ingram  reports that he has quit smoking. His smoking use included cigarettes. He has never used smokeless tobacco. He reports that he does not drink alcohol or use drugs.    Family History   I have reviewed this patient's family history and updated it with pertinent information if needed.   Family History   Problem Relation Age of Onset     C.A.D. Father      Cancer Mother         breast cancer,  of pneumonia     Cerebrovascular Disease Son      CABG Son      Family History Negative Child      Family History Negative Sister      Heart Disease Brother      Review of Systems   The 10 point Review of Systems is negative other than noted in the HPI or here.    Physical Exam   Temp: 101.2  F (38.4  C) Temp src: Temporal BP: (!) 162/87 Pulse: 88   Resp: 22 SpO2: 90 % O2 Device: None (Room air)    Vital Signs with Ranges  Temp:  [101.2  F (38.4  C)] 101.2  F (38.4  C)  Pulse:  [] 88  Resp:  [22] 22  BP: (156-164)/(76-90) 162/87  SpO2:  [90 %-97 %] 90 %  156 lbs 0 oz    Constitutional: awake, alert, cooperative, no apparent distress, very hard of hearing, communicates through sign language with his son  Eyes: pupils equal, round and reactive to light, conjunctiva normal  ENT: oral pharynx with moist mucous membranes  Respiratory: coarse crackles at the bases, no wheezes  Cardiovascular: regular rate and rhythm, normal S1 and S2, systolic murmur noted  GI: normal bowel sounds, soft, non-distended, non-tender  Skin: warm, dry  Musculoskeletal: no lower extremity pitting edema present  Neurologic: awake, alert, oriented to name, place and time. motor is 5 out of 5 bilaterally. sensory is  intact.    Data   Data reviewed today:  I personally reviewed the EKG tracing showing atrial fibrillation with nonspecific T wave changes and the chest x-ray image(s) showing opacities in the right mid lung and left lung base.    Recent Labs   Lab 07/03/20  1558   WBC 7.6   HGB 12.6*   MCV 74*   *      POTASSIUM 4.1   CHLORIDE 106   CO2 25   BUN 21   CR 1.29*   ANIONGAP 7   AYALA 10.0   *   ALBUMIN 4.0   PROTTOTAL 8.1   BILITOTAL 1.4*   ALKPHOS 79   ALT 23   AST 21   TROPI 0.088*     Recent Results (from the past 24 hour(s))   XR Chest Port 1 View    Narrative    CHEST ONE VIEW PORTABLE   7/3/2020 4:35 PM     HISTORY:  Cough. Dyspnea.    COMPARISON: 4/23/2020.      Impression    IMPRESSION: New ill-defined opacity in the right mid lung and  perihilar regions as well as at the left lung base, suspicious for  pneumonia. There are also likely small bilateral pleural effusions.  Sternotomy. Aortic calcification. Heart size appears stable. Pulmonary  vascularity is within normal limits. No pneumothorax.    CASH RICO MD

## 2020-07-03 NOTE — ED TRIAGE NOTES
Pt [place], here with son. Pt. Has cough and shortness of breath, same symptoms when pt had pneumonia in the past.

## 2020-07-03 NOTE — PROGRESS NOTES
RECEIVING UNIT ED HANDOFF REVIEW    ED Nurse Handoff Report was reviewed by: Jacek Willett RN on July 3, 2020 at 6:39 PM

## 2020-07-03 NOTE — ED PROVIDER NOTES
History     Chief Complaint:  Shortness of breath     HPI   Shiraz Ingram is a 85 year old male, accompanied by his son who helped to interpret, with a history of CAD, CHF, hypertension, CKD stage 3, acute respiratory failure, and aortic stenosis who presents with shortness of breath. Last night he began experiencing a coarse cough, fever, and shortness of breath. He denies any chest pain. He has not had any known exposure to Covid-19 however he did by a lottery ticket at the [email or web address] yesterday. His son said this episode is similar to previous pneumonia episodes. Notably, he has a history of severe aortic stenosis and is scheduled to have a valve replacement in 11 days.    Allergies:  NKDA     Medications:    Albuterol  Pulmicort  Pradaxa  Antivert  Singulair  Protonix  Simvastatin  Xarelto    Past Medical History:    Allergic rhinitis  Anemia  Aortic stenosis  Benign neoplasm of colon  CKD stage 3  Coronary atherosclerosis  Hyperlipidemia  Hypertension  Osteoarthritis  Asthma  Atrial fibrillation  Hyperkalemia  GI bleed  Aortic stenosis  Acute respiratory failure  CHF  CAD    Past Surgical History:    Coronary artery bypass  Cardiac surgery  Right heart catheterization  Herniorrhaphy inguinal    Family History:    CAD - father  Breast cancer - mother  Cerebrovascular disease - son  CABG - son  Heart disease - brother    Social History:  Smoking status: no  Smokeless tobacco: no  Alcohol use: no  Drug use: no  Marital Status:   [2]     Review of Systems   Constitutional: Positive for fever.   Respiratory: Positive for cough and shortness of breath.    Cardiovascular: Negative for chest pain.   All other systems reviewed and are negative.    Physical Exam     Patient Vitals for the past 24 hrs:  Patient Vitals for the past 24 hrs:   BP Temp Temp src Pulse Resp SpO2 Height Weight   07/03/20 1615 (!) 162/87 -- -- 88 -- 90 % -- --   07/03/20 1600 (!) 156/90 -- -- 100 -- 91 % -- --   07/03/20 1441 (!) 164/76  "101.2  F (38.4  C) Temporal 90 22 97 % 1.727 m (5' 8\") 70.8 kg (156 lb)     Physical Exam  General: Alert and interactive. Hard of hearing. Signs with son.   Eyes: The pupils are equal and round. EOMs intact. No scleral icterus.  ENT: No abnormalities to the external nose or ears. Mucous membranes moist. Posterior oropharynx is non-erythematous.  Neck: Trachea is in the midline. No nuchal rigidity.     CV: Regular rate and rhythm. Harsh systolic murmur.   Resp: Crackles and rales in right side of lungs and at base of left lung. 2+ pitting edema to bilateral lower extremities.   GI: Abdomen is soft without distension. No tenderness to palpation. No peritoneal signs.    MS: Moving all extremities well. Good muscle tone.   Skin: Warm and dry. No rash or lesions noted.  Neuro: Alert and oriented x 3. No focal neurologic deficits. Good strength and sensation in upper and lower extremities. Psych: Awake. Alert.  Normal affect. Appropriate interactions.  Lymph: No anterior or posterior cervical lymphadenopathy noted.    Emergency Department Course   ECG (17:17:49):  Rate 92 bpm. TX interval *. QRS duration 144. QT/QTc 386/477. P-R-T axes * -63 32. Atrial fibrillation with premature ventricular or aberrantly conducted complexes. Right bundle branch block. Left anterior fascicular block. Bifascicular block. Abnormal ECG. Interpreted at 1722 by Daphne Mcmahon PA.    Imaging:  Radiology findings were communicated with the patient who voiced understanding of the findings.    XR Chest Port 1 View  IMPRESSION: New ill-defined opacity in the right mid lung and  perihilar regions as well as at the left lung base, suspicious for  pneumonia. There are also likely small bilateral pleural effusions.  Sternotomy. Aortic calcification. Heart size appears stable. Pulmonary  vascularity is within normal limits. No pneumothorax.  As read by Radiology.    Laboratory:  Laboratory findings were communicated with the patient who voiced " understanding of the findings.    Nt probnp 1558: 6814(H)    Lactic acid 1556: 1.4    CBC: HGB 12.6(L), (L), o/w WNL (WBC 7.6)    CMP: Glucose 118(H), Creatinine 1.29(H), GFR Estimate 50(H)    Troponin I 1558: 0.088(H)    Blood Culture x2: Pending    Symptomatic Covid-19 PCR: Pending     Interventions:  1639 Albuterol 6 puff Inhalation  1714 Tylenol 1000 mg PO  1716 Lasix 40 mg IV    Emergency Department Course:  Past medical records, nursing notes, and vitals reviewed.  1600: I performed an exam of the patient and obtained history, as documented above.     EKG was obtained and reviewed in the emergency department.    IV inserted and blood drawn.    The patient was sent for a chest XR while in the emergency department, results above.     1710: I rechecked the patient. I reviewed the results with the Patient and answered all related questions prior to admission.     Findings and plan explained to the Patient who consents to admission. Discussed the patient with Dr. Alford, who will admit the patient to a [facility] bed for further monitoring, evaluation, and treatment.  Impression & Plan   Medical Decision Makin y.o. male with a history of coronary artery disease, status post coronary graft in the early s, severe aortic stenosis with plans for valve replacement in the near future, left atrial appendage currently on Pradaxa, atrial fibrillation, and chronic diastolic CHF, who presents for evaluation of cough, fever, and shortness of breath since last evening. Patient's son is with him, who is very familiar with his history, stating this is exactly the same as his presentation when he had pneumonia in the past. Patient's laboratory workup is reassuring with no signs of anemia or significant leukocytosis. Creatinine is about baseline and no electrolyte abnormality. EKG shows no specific ST changes to suggest ischemia infarction and his troponin is mildly elevated (chronic for him and likely secondary to volume  overload). Patient's BNP is elevated and with lower extremity edema, shortness of breath, and pleural effusions, this is likely CHF exacerbation. X-ray also shows right lower lobe pneumonia, concerning for infectious etiology. COVID-19 swab also obtained given ongoing symptoms. Mild hypoxia improving with oxygen. Fortunately, lactic acid negative, and no currents signs of severe sepsis. Patient will need Zosyn for hospital acquired pneumonia coverage. He was also given lasix in the ED. Patient and his son were amenable with the plan at this time. Discussed care with Dr. Alford, who will admit to cardiac telemetry bed for further evaluation and treatment.     Diagnosis:    ICD-10-CM   1. Pneumonia of right lower lobe due to infectious organism  J18.9   2. Severe aortic stenosis  I35.0   3. History of coronary artery disease  Z86.79   4. Acute on chronic diastolic congestive heart failure (H)  I50.33   5. Paroxysmal atrial fibrillation (H)  I48.0     Disposition:  [place] [facility]    Zion Tamayo  7/3/2020    EMERGENCY DEPARTMENT    Scribe Disclosure:  I, Zion Tamayo, am serving as a scribe at 3:49 PM on 7/3/2020 to document services personally performed by Daphne Mcmahon PA based on my observations and the provider's statements to me.          Daphne Mcmahon PA-C  07/03/20 0222

## 2020-07-03 NOTE — ED NOTES
"[facility]  ED Nurse Handoff Report    ED Chief complaint: Shortness of Breath and Cough      ED Diagnosis:   Final diagnoses:   Pneumonia of right lower lobe due to infectious organism   Severe aortic stenosis   History of coronary artery disease   Acute on chronic diastolic congestive heart failure (H)   Paroxysmal atrial fibrillation (H)       Code Status: DNR / DNI    Allergies:   Allergies   Allergen Reactions     No Known Drug Allergies        Patient Story: patient here with a fever,cough and shortness of breath started last night He has hx of CAD,CHF Focused Assessment:  See above    Treatments and/or interventions provided: labs,ekg.blood culture and antibiotic   Patient's response to treatments and/or interventions: ongoing    To be done/followed up on inpatient unit:  .    Does this patient have any cognitive concerns?: patient alert and orient with hearing impair.    Activity level - Baseline/Home:  Independent. Patient lives at home with his son  Activity Level - Current:   Independent    Patient's Preferred language: American Sign Language   Needed?: Yes    Isolation: None, Contact  and Droplet  Infection: Not Applicable.COVID R/O  Other   Bariatric?: No    Vital Signs:   Vitals:    07/03/20 1441 07/03/20 1600 07/03/20 1615   BP: (!) 164/76 (!) 156/90 (!) 162/87   Pulse: 90 100 88   Resp: 22     Temp: 101.2  F (38.4  C)     TempSrc: Temporal     SpO2: 97% 91% 90%   Weight: 70.8 kg (156 lb)     Height: 1.727 m (5' 8\")         Cardiac Rhythm:     Was the PSS-3 completed:   Yes  What interventions are required if any?               Family Comments: patient son here   OBS brochure/video discussed/provided to patient/family: No              Name of person given brochure if not patient: .              Relationship to patient: .    For the majority of the shift this patient's behavior was Green.   Behavioral interventions performed were ..    ED NURSE PHONE NUMBER: [phone]   "

## 2020-07-03 NOTE — ED PROVIDER NOTES
Emergency Department Attending Supervision Note  7/3/2020  5:54 PM      I evaluated this patient in conjunction with Daphne Mcmahon PA-C.      Briefly, Shiraz Ingram is an 85-year-old male with history of CAD, CHF, hypertension, CKD, and severe aortic stenosis who presents with increased shortness of breath.  Other symptoms include productive cough, fever.  Overall, symptoms seem similar to previous infections from pneumonia.    On my exam, the patient is febrile to 101.2  F.  Blood pressure is hypertensive at 162/87.  He is slightly hypoxic at 90% on room air.  Son is communicating with the patient with hand signs.  The patient is somewhat cachectic.  The patient is alert and oriented, though with increased work of breathing.  He has rales are audible across the room, and intermittent wet cough.  He has a 3 out of 6 systolic ejection murmur.  Skin is warm and well-perfused.    Results:  Labs Ordered and Resulted from Time of ED Arrival Up to the Time of Departure from the ED   CBC WITH PLATELETS DIFFERENTIAL - Abnormal; Notable for the following components:       Result Value    Hemoglobin 12.6 (*)     MCV 74 (*)     MCH 23.4 (*)     RDW 18.7 (*)     Platelet Count 133 (*)     Absolute Lymphocytes 0.4 (*)     All other components within normal limits   COMPREHENSIVE METABOLIC PANEL - Abnormal; Notable for the following components:    Glucose 118 (*)     Creatinine 1.29 (*)     GFR Estimate 50 (*)     GFR Estimate If Black 58 (*)     Bilirubin Total 1.4 (*)     All other components within normal limits   TROPONIN I - Abnormal; Notable for the following components:    Troponin I ES 0.088 (*)     All other components within normal limits   NT PROBNP INPATIENT - Abnormal; Notable for the following components:    N-Terminal Pro BNP Inpatient 6,814 (*)     All other components within normal limits   COVID-19 VIRUS (CORONAVIRUS) BY PCR   LACTIC ACID WHOLE BLOOD   SARS-COV-2 (COVID-19) VIRUS RT-PCR   PERIPHERAL IV CATHETER    BLOOD CULTURE   BLOOD CULTURE     XR Chest Port 1 View   Final Result   IMPRESSION: New ill-defined opacity in the right mid lung and   perihilar regions as well as at the left lung base, suspicious for   pneumonia. There are also likely small bilateral pleural effusions.   Sternotomy. Aortic calcification. Heart size appears stable. Pulmonary   vascularity is within normal limits. No pneumothorax.      CASH RICO MD          ED course:  The patient was given Tylenol, albuterol, Lasix, and broad-spectrum antibiotics with Zosyn.  He is remained hemodynamically stable.  Mild hypoxia was improved with 2 L of oxygen via nasal cannula.  Patient discussed with Dr. Antoine by ALICJA Cook.  My impression is 85-year-old male with history of coronary artery disease, severe aortic stenosis, chronic CHF who presents with combination of volume overload as well as symptoms consistent with pneumonia.  Given ongoing COVID pandemic, COVID-19 testing was obtained.  EKG does not demonstrate any ischemic findings though troponin is mildly elevated.  [place] [facility] [place] antibiotics, respiratory support, and diuresis.        Diagnosis    ICD-10-CM    1. Pneumonia of right lower lobe due to infectious organism  J18.9    2. Severe aortic stenosis  I35.0    3. History of coronary artery disease  Z86.79    4. Acute on chronic diastolic congestive heart failure (H)  I50.33    5. Paroxysmal atrial fibrillation (H)  I48.0        MD Trina Garcia Tracy Lynn, MD  07/04/20 2154

## 2020-07-03 NOTE — PHARMACY-ADMISSION MEDICATION HISTORY
Pharmacy Medication History  Admission medication history interview status for the 7/3/2020  admission is complete. See EPIC admission navigator for prior to admission medications     Medication history sources: Patient (with the help of his son- preferred lamguage American Sign Language) and dispense report  Medication history source reliability: Good    Significant changes made to the medication list:  Added lisinopril      Additional medication history information:   none      Prior to Admission medications    Medication Sig Last Dose Taking? Auth Provider   albuterol (PROAIR HFA) 108 (90 Base) MCG/ACT inhaler INHALE 1 TO 2 PUFFS EVERY 4 TO 6 HOURS AS NEEDED  Yes Dany Rodriguez MD   budesonide (PULMICORT) 0.5 MG/2ML nebulizer solution Take 2 mLs (0.5 mg) by nebulization 2 times daily 7/3/2020 at am Yes Dany Rodriguez MD   calcium carbonate 600 mg-vitamin D 400 units (CALTRATE) 600-400 MG-UNIT per tablet Take 1 tablet by mouth daily 7/3/2020 at am Yes Unknown, Entered By History   dabigatran ANTICOAGULANT (PRADAXA) 150 MG capsule Take 1 capsule (150 mg) by mouth 2 times daily Store in original 's bottle or blister pack; use within 120 days of opening. 7/3/2020 at x1 Yes Ryan Samuel MD   ferrous sulfate (IRON) 325 (65 Fe) MG tablet TAKE 1 TABLET(325 MG) BY MOUTH TWICE DAILY 7/3/2020 at x1 Yes Dany Rodriguez MD   ipratropium - albuterol 0.5 mg/2.5 mg/3 mL (DUONEB) 0.5-2.5 (3) MG/3ML nebulization Inhale 1 vial (3 mLs) into the lungs 4 times daily 7/3/2020 at x2 Yes Dany Rodriguez MD   lisinopril (ZESTRIL) 20 MG tablet Take 20 mg by mouth daily 7/3/2020 at am Yes Unknown, Entered By History   metoprolol tartrate (LOPRESSOR) 50 MG tablet Take 50 mg by mouth daily 7/3/2020 at Unknown time Yes Unknown, Entered By History   montelukast (SINGULAIR) 10 MG tablet TAKE 1 TABLET(10 MG) BY MOUTH DAILY 7/2/2020 at hs Yes Dany Rodriguez MD    multivitamin, therapeutic (THERA-VIT) TABS tablet Take 1 tablet by mouth every evening  7/2/2020 at Unknown time Yes Unknown, Entered By History   pantoprazole (PROTONIX) 40 MG EC tablet Take 1 tablet (40 mg) by mouth 2 times daily (before meals) 7/3/2020 at x1 Yes Gary Alonso MD   simvastatin (ZOCOR) 40 MG tablet TAKE 1 TABLET(40 MG) BY MOUTH AT BEDTIME 7/2/2020 at hs Yes Dany Rodriguez MD

## 2020-07-04 LAB
ANION GAP SERPL CALCULATED.3IONS-SCNC: 7 MMOL/L (ref 3–14)
BUN SERPL-MCNC: 25 MG/DL (ref 7–30)
CALCIUM SERPL-MCNC: 9 MG/DL (ref 8.5–10.1)
CHLORIDE SERPL-SCNC: 104 MMOL/L (ref 94–109)
CO2 SERPL-SCNC: 27 MMOL/L (ref 20–32)
CREAT SERPL-MCNC: 1.58 MG/DL (ref 0.66–1.25)
ERYTHROCYTE [DISTWIDTH] IN BLOOD BY AUTOMATED COUNT: 18.5 % (ref 10–15)
GFR SERPL CREATININE-BSD FRML MDRD: 39 ML/MIN/{1.73_M2}
GLUCOSE SERPL-MCNC: 111 MG/DL (ref 70–99)
GRAM STN SPEC: NORMAL
HCT VFR BLD AUTO: 32.4 % (ref 40–53)
HGB BLD-MCNC: 10.4 G/DL (ref 13.3–17.7)
HGB BLD-MCNC: 10.7 G/DL (ref 13.3–17.7)
Lab: NORMAL
MCH RBC QN AUTO: 23.5 PG (ref 26.5–33)
MCHC RBC AUTO-ENTMCNC: 32.1 G/DL (ref 31.5–36.5)
MCV RBC AUTO: 73 FL (ref 78–100)
PLATELET # BLD AUTO: 121 10E9/L (ref 150–450)
POTASSIUM SERPL-SCNC: 3.7 MMOL/L (ref 3.4–5.3)
RBC # BLD AUTO: 4.42 10E12/L (ref 4.4–5.9)
SODIUM SERPL-SCNC: 138 MMOL/L (ref 133–144)
SPECIMEN SOURCE: NORMAL
TROPONIN I SERPL-MCNC: 0.12 UG/L (ref 0–0.04)
WBC # BLD AUTO: 5.8 10E9/L (ref 4–11)

## 2020-07-04 PROCEDURE — 87205 SMEAR GRAM STAIN: CPT | Performed by: HOSPITALIST

## 2020-07-04 PROCEDURE — 12000011 ZZH R&B MS OVERFLOW

## 2020-07-04 PROCEDURE — 25000128 H RX IP 250 OP 636: Performed by: HOSPITALIST

## 2020-07-04 PROCEDURE — 87070 CULTURE OTHR SPECIMN AEROBIC: CPT | Performed by: HOSPITALIST

## 2020-07-04 PROCEDURE — 99232 SBSQ HOSP IP/OBS MODERATE 35: CPT | Performed by: HOSPITALIST

## 2020-07-04 PROCEDURE — 84484 ASSAY OF TROPONIN QUANT: CPT | Performed by: HOSPITALIST

## 2020-07-04 PROCEDURE — 85018 HEMOGLOBIN: CPT | Performed by: HOSPITALIST

## 2020-07-04 PROCEDURE — 25000132 ZZH RX MED GY IP 250 OP 250 PS 637: Performed by: HOSPITALIST

## 2020-07-04 PROCEDURE — 85027 COMPLETE CBC AUTOMATED: CPT | Performed by: HOSPITALIST

## 2020-07-04 PROCEDURE — 80048 BASIC METABOLIC PNL TOTAL CA: CPT | Performed by: HOSPITALIST

## 2020-07-04 PROCEDURE — 25000125 ZZHC RX 250: Performed by: HOSPITALIST

## 2020-07-04 PROCEDURE — 36415 COLL VENOUS BLD VENIPUNCTURE: CPT | Performed by: HOSPITALIST

## 2020-07-04 RX ORDER — IPRATROPIUM BROMIDE AND ALBUTEROL SULFATE 2.5; .5 MG/3ML; MG/3ML
3 SOLUTION RESPIRATORY (INHALATION)
Status: DISCONTINUED | OUTPATIENT
Start: 2020-07-04 | End: 2020-07-05 | Stop reason: HOSPADM

## 2020-07-04 RX ORDER — AZITHROMYCIN 250 MG/1
250 TABLET, FILM COATED ORAL EVERY 24 HOURS
Status: DISCONTINUED | OUTPATIENT
Start: 2020-07-04 | End: 2020-07-05 | Stop reason: HOSPADM

## 2020-07-04 RX ORDER — DABIGATRAN ETEXILATE 75 MG/1
75 CAPSULE ORAL 2 TIMES DAILY
Status: DISCONTINUED | OUTPATIENT
Start: 2020-07-04 | End: 2020-07-05 | Stop reason: HOSPADM

## 2020-07-04 RX ORDER — BUDESONIDE 0.5 MG/2ML
0.5 INHALANT ORAL 2 TIMES DAILY
Status: DISCONTINUED | OUTPATIENT
Start: 2020-07-04 | End: 2020-07-05 | Stop reason: HOSPADM

## 2020-07-04 RX ADMIN — AZITHROMYCIN MONOHYDRATE 250 MG: 250 TABLET ORAL at 20:27

## 2020-07-04 RX ADMIN — IPRATROPIUM BROMIDE AND ALBUTEROL 1 PUFF: 20; 100 SPRAY, METERED RESPIRATORY (INHALATION) at 08:44

## 2020-07-04 RX ADMIN — ALBUTEROL SULFATE 2 PUFF: 90 AEROSOL, METERED RESPIRATORY (INHALATION) at 12:36

## 2020-07-04 RX ADMIN — ALBUTEROL SULFATE 2 PUFF: 108 INHALANT RESPIRATORY (INHALATION) at 00:35

## 2020-07-04 RX ADMIN — SIMVASTATIN 40 MG: 40 TABLET, FILM COATED ORAL at 22:11

## 2020-07-04 RX ADMIN — IPRATROPIUM BROMIDE AND ALBUTEROL 1 PUFF: 20; 100 SPRAY, METERED RESPIRATORY (INHALATION) at 12:36

## 2020-07-04 RX ADMIN — DABIGATRAN ETEXILATE MESYLATE 75 MG: 75 CAPSULE ORAL at 20:27

## 2020-07-04 RX ADMIN — PIPERACILLIN SODIUM AND TAZOBACTAM SODIUM 3.38 G: 3; .375 INJECTION, POWDER, LYOPHILIZED, FOR SOLUTION INTRAVENOUS at 05:03

## 2020-07-04 RX ADMIN — ALBUTEROL SULFATE 2 PUFF: 90 AEROSOL, METERED RESPIRATORY (INHALATION) at 17:16

## 2020-07-04 RX ADMIN — IPRATROPIUM BROMIDE AND ALBUTEROL SULFATE 3 ML: .5; 3 SOLUTION RESPIRATORY (INHALATION) at 22:11

## 2020-07-04 RX ADMIN — MONTELUKAST 10 MG: 10 TABLET, FILM COATED ORAL at 22:11

## 2020-07-04 RX ADMIN — THERA TABS 1 TABLET: TAB at 20:27

## 2020-07-04 RX ADMIN — ALBUTEROL SULFATE 2 PUFF: 90 AEROSOL, METERED RESPIRATORY (INHALATION) at 08:45

## 2020-07-04 RX ADMIN — PIPERACILLIN SODIUM AND TAZOBACTAM SODIUM 3.38 G: 3; .375 INJECTION, POWDER, LYOPHILIZED, FOR SOLUTION INTRAVENOUS at 20:32

## 2020-07-04 RX ADMIN — FERROUS SULFATE TAB 325 MG (65 MG ELEMENTAL FE) 325 MG: 325 (65 FE) TAB at 08:48

## 2020-07-04 RX ADMIN — PIPERACILLIN SODIUM AND TAZOBACTAM SODIUM 3.38 G: 3; .375 INJECTION, POWDER, LYOPHILIZED, FOR SOLUTION INTRAVENOUS at 00:07

## 2020-07-04 RX ADMIN — PANTOPRAZOLE SODIUM 40 MG: 40 TABLET, DELAYED RELEASE ORAL at 06:45

## 2020-07-04 RX ADMIN — DABIGATRAN ETEXILATE MESYLATE 150 MG: 150 CAPSULE ORAL at 08:49

## 2020-07-04 RX ADMIN — FERROUS SULFATE TAB 325 MG (65 MG ELEMENTAL FE) 325 MG: 325 (65 FE) TAB at 20:27

## 2020-07-04 RX ADMIN — CALCIUM CARBONATE 600 MG (1,500 MG)-VITAMIN D3 400 UNIT TABLET 1 TABLET: at 08:49

## 2020-07-04 RX ADMIN — PANTOPRAZOLE SODIUM 40 MG: 40 TABLET, DELAYED RELEASE ORAL at 17:16

## 2020-07-04 RX ADMIN — IPRATROPIUM BROMIDE AND ALBUTEROL SULFATE 3 ML: .5; 3 SOLUTION RESPIRATORY (INHALATION) at 17:51

## 2020-07-04 RX ADMIN — BUDESONIDE 0.5 MG: 0.5 INHALANT RESPIRATORY (INHALATION) at 22:12

## 2020-07-04 RX ADMIN — ALBUTEROL SULFATE 2 PUFF: 108 INHALANT RESPIRATORY (INHALATION) at 04:41

## 2020-07-04 RX ADMIN — PIPERACILLIN SODIUM AND TAZOBACTAM SODIUM 3.38 G: 3; .375 INJECTION, POWDER, LYOPHILIZED, FOR SOLUTION INTRAVENOUS at 12:37

## 2020-07-04 ASSESSMENT — ACTIVITIES OF DAILY LIVING (ADL)
ADLS_ACUITY_SCORE: 12
DRESS: 0-->INDEPENDENT
ADLS_ACUITY_SCORE: 13
ADLS_ACUITY_SCORE: 13
SWALLOWING: 0-->SWALLOWS FOODS/LIQUIDS WITHOUT DIFFICULTY
ADLS_ACUITY_SCORE: 12
RETIRED_EATING: 0-->INDEPENDENT
AMBULATION: 0-->INDEPENDENT
TRANSFERRING: 0-->INDEPENDENT
ADLS_ACUITY_SCORE: 13
TOILETING: 0-->INDEPENDENT
RETIRED_COMMUNICATION: 3-->UNABLE TO SPEAK (NOT RELATED TO LANGUAGE BARRIER)
COGNITION: 0 - NO COGNITION ISSUES REPORTED
FALL_HISTORY_WITHIN_LAST_SIX_MONTHS: NO
ADLS_ACUITY_SCORE: 13
BATHING: 0-->INDEPENDENT

## 2020-07-04 ASSESSMENT — MIFFLIN-ST. JEOR: SCORE: 1364.39

## 2020-07-04 NOTE — PLAN OF CARE
COVID-19 NEGATIVE, precautions discontinued. Pt is [place], fluent in ASL, otherwise communicates w/ pen and paper. A&Ox4. VSS on 1 LPM. CLEARY, generalized weakness. LS expiratory wheezes at times, but denies SOB. Productive cough. Sputum sample sent. IS at bedside, proper technique demonstrated, pt performing w/ encouragement. Up SBA. Voiding adequately w/o difficulty. Criselda reg diet. Plan for continued IV antibiotics and wean off oxygen as able. Continue to monitor.      was here today from [phone].  requested for tomorrow from [phone].

## 2020-07-04 NOTE — PROGRESS NOTES
Pt is [place] knows ASL. I asked if he would like an . He said yes, so an  has been requested.     If no  present, please communicate w/ pen and paper.

## 2020-07-04 NOTE — PROGRESS NOTES
[facility]    Medicine Progress Note - Hospitalist Service       Date of Admission:  7/3/2020  Assessment & Plan   Shiraz Ingram is a 85 year old male with a complex past medical history including critical aortic stenosis, coronary artery disease, atrial fibrillation, left atrial thrombus, recent GI bleed secondary to bleeding esophageal ulcer, senile hearing loss, chronic anemia, chronic diastolic congestive heart failure, hypertension, hyperlipidemia, moderate persistent asthma, and chronic kidney disease stage III who presented to the ER with fevers, cough, and shortness of breath.  After evaluation in the ER, there was concern for pneumonia.  He was started on antibiotics and admitted to the hospitalist service for further evaluation and management.    Community acquired pneumonia  - Increased risk of gram negative and multi-drug resistant organisms as this is his fourth admission to the hospital in the past 90 days.  - COVID-19 testing negative.  - CXR whows right mid lung and left lung base opacities consistent with pneumonia, as well as small bilateral pleural effusions.  - Received a dose of Zosyn in the ER, will continue the same.  - Sputum culture pending.  - Blood cultures pending, negative to date.  - Lactic acid within normal limits.  - Sats are currently in the mid 90s on 2 lpm of supplemental oxygen, wean as tolerated.    COVID-19 testing negative  - COVID-19 testing was obtained in the ER and it was negative.  - He has been basically quarantining at home. He does live with several family members, however they do not have any known COVID-19 exposures.  - Based on his history, he appears to be low risk for COVID-19. His symptoms can be explained by bacterial community acquired pneumonia.  COVID-19 testing returned negative and isolation precautions were discontinued.    Severe aortic stenosis  -Scheduled for TAVR on July 14 (not July 7 as they previously told me in the ER) per report  from patient's son. Patient's son states he is already called the clinic to let them know about the patient's admission.  -Recommend that they follow-up with his outpatient provider on Monday.  -Monitor volume status closely while he is in the hospital.    Left atrial appendage thrombosis  -Continue PTA Pradaxa, dose to be adjusted for renal function by pharmacy.    Atrial fibrillation  -Not on any chronic rate or rhythm control medications.  Rate currently in the 80s ER.  -Continue PTA Pradaxa, dose to be adjusted for renal function by pharmacy.    Chronic diastolic congestive heart failure  -Most recent echo in April 2020 showed an LVEF of 50 to 55%, moderate left ventricular hypertrophy, critical aortic stenosis, moderately decreased right ventricular systolic function.  -Appears to be close to euvolemic.  Chronic lower extremity edema is at baseline.  Weight has been stable.  -He was given a dose of IV Lasix in the ER, will hold off on further Lasix dosing for now.  I do not think he needs any further diuresis at this point.  -Monitor I&O and daily weights.    Elevated troponin  -Troponin slightly elevated at 0.088 in the ER, then up to 0.238, now down to 0.125 this morning.  -Has had some slight chest discomfort with coughing, but no chest pain suggestive of ACS..  -Suspicion for ACS is low.  -Suspect troponin was related due to demand ischemia in setting of his acute illness.    Recent upper GI bleed secondary to bleeding esophageal ulcer  -Was recently admitted from May 23 through May 30 due to an upper GI bleed from a bleeding esophageal ulcer.  -Continue PTA Protonix.  -Hemoglobin lower at 10.4 today.  Denies any melena or hematochezia.  Recheck hemoglobin this afternoon.    Hypertension  -Not on any chronic medications for blood pressure control.  -Blood pressure mildly elevated today.    Hyperlipidemia  -Continue PTA simvastatin.    Moderate persistent asthma  -Few scattered wheezes on exam today, does  not appear to be in acute exacerbation.  -COVID testing was negative, will resume PTA nebulizers.    Chronic kidney disease, stage III  -Baseline creatinine is in the 1.4-1.7 range.  -Creatinine 1.29 in the ER, now up to 1.58 this morning.  -Recheck in a.m.    Senile hearing loss  -He uses American sign language to communicate.  - was used today.     Diet: Combination Diet Regular Diet Adult    DVT Prophylaxis: Pradaxa  Pak Catheter: not present  Code Status: Full Code           Disposition Plan   Expected discharge: Possible discharge home tomorrow if symptoms continue to improve, creatinine and hemoglobin okay, and able to wean off oxygen.  Entered: Arcadio Alford MD 07/04/2020, 11:49 AM       The patient's care was discussed with the Bedside Nurse, Patient and Patient's Family.    Arcadio Alford MD  Hospitalist Service  [facility]    ______________________________________________________________________    Interval History   Shiraz Ingram was seen this morning.  His son, Forrest, was in the room during the visit.  An  was also in the room for the visit today.  The patient feels better today.  Shortness of breath is improving.  Cough has improved.  Denies any chest discomfort with coughing today.  Denies fevers, chills, sweats.  No abdominal pain, nausea, diarrhea, or urinary symptoms.    Data reviewed today: I reviewed all medications, new labs and imaging results over the last 24 hours. I personally reviewed no images or EKG's today.    Physical Exam   Vital Signs: Temp: 97.5  F (36.4  C) Temp src: Oral BP: (!) 146/64 Pulse: 85   Resp: 18 SpO2: 94 % O2 Device: Nasal cannula Oxygen Delivery: 1 LPM  Weight: 155 lbs 6.4 oz  Constitutional: awake, alert, cooperative, no apparent distress  Respiratory: few coarse crackles at the bases and scattered wheezes  Cardiovascular: regular rate and rhythm, normal S1 and S2, no murmur noted  GI: normal bowel sounds, soft,  non-distended, non-tender  Skin: warm, dry  Musculoskeletal: 1+ chronic lower extremity pitting edema present  Neurologic: awake, alert, oriented to name, place and time    Data   Recent Labs   Lab 07/04/20  0520 07/03/20 2129 07/03/20  1558   WBC 5.8  --  7.6   HGB 10.4*  --  12.6*   MCV 73*  --  74*   *  --  133*     --  138   POTASSIUM 3.7  --  4.1   CHLORIDE 104  --  106   CO2 27  --  25   BUN 25  --  21   CR 1.58*  --  1.29*   ANIONGAP 7  --  7   AYALA 9.0  --  10.0   *  --  118*   ALBUMIN  --   --  4.0   PROTTOTAL  --   --  8.1   BILITOTAL  --   --  1.4*   ALKPHOS  --   --  79   ALT  --   --  23   AST  --   --  21   TROPI 0.125* 0.238* 0.088*     Medications     - MEDICATION INSTRUCTIONS -         albuterol  2 puff Inhalation 4x Daily     azithromycin  250 mg Intravenous Q24H     calcium carbonate 600 mg-vitamin D 400 units  1 tablet Oral Daily     dabigatran ANTICOAGULANT  75 mg Oral BID     ferrous sulfate  325 mg Oral BID     fluticasone  1 puff Inhalation Daily     ipratropium-albuterol  1 puff Inhalation 4x daily     lisinopril  20 mg Oral Daily     montelukast  10 mg Oral At Bedtime     multivitamin, therapeutic  1 tablet Oral QPM     pantoprazole  40 mg Oral BID AC     piperacillin-tazobactam  3.375 g Intravenous Q6H     simvastatin  40 mg Oral At Bedtime     sodium chloride (PF)  3 mL Intracatheter Q8H

## 2020-07-04 NOTE — PROVIDER NOTIFICATION
MD Notification    Notified Person: MD    Notified Person Name: Prashanth    Notification Date/Time: 0738 7/4/20    Notification Interaction: text page    Purpose of Notification: COVID-19 test negative. ok to transfer to medical floor or plan for retest?    Orders Received: awaiting callback      ADDENDUM 0756: Discontinue COVID-19 precautions. Ok to transfer to [facility]

## 2020-07-04 NOTE — PLAN OF CARE
Negative Covid. Senile hearing loss. Uses American Sign Language, paper and pen to communicate. A/O x 4. VSS, on 2L NC. Troponin 0.125. Denies pain. C/O SOB, requested PRN albuterol inhaler to manage. Unable to produce sputum for culture. PIV SL with intermittent IV abx. Strict I & O. Regular diet. SBA.

## 2020-07-04 NOTE — PLAN OF CARE
A&O, [place], slow in speech, can communicate using pen and paper, calm, cooperative, VSS on 2  ltrs O2, LS with crackles, on Regular diet with good appetite, Troponin elevated, MD informed, call back received, advised to continue to monitor pt who is asymptomatic, Pt. Stated he is independent baseline, denies pain, continent using urinal.

## 2020-07-04 NOTE — PROGRESS NOTES
IV infiltrated. Zosyn infusion stopped. Ice applied to golf ball sized swelling at site. Pain at site 2/10 w/ touch. IV removed. Resource RN to come place new IV

## 2020-07-05 VITALS
HEART RATE: 82 BPM | HEIGHT: 68 IN | OXYGEN SATURATION: 94 % | BODY MASS INDEX: 23.37 KG/M2 | SYSTOLIC BLOOD PRESSURE: 132 MMHG | DIASTOLIC BLOOD PRESSURE: 68 MMHG | WEIGHT: 154.2 LBS | RESPIRATION RATE: 18 BRPM | TEMPERATURE: 96.8 F

## 2020-07-05 LAB
ANION GAP SERPL CALCULATED.3IONS-SCNC: 7 MMOL/L (ref 3–14)
BUN SERPL-MCNC: 22 MG/DL (ref 7–30)
CALCIUM SERPL-MCNC: 9.2 MG/DL (ref 8.5–10.1)
CHLORIDE SERPL-SCNC: 101 MMOL/L (ref 94–109)
CO2 SERPL-SCNC: 27 MMOL/L (ref 20–32)
CREAT SERPL-MCNC: 1.61 MG/DL (ref 0.66–1.25)
ERYTHROCYTE [DISTWIDTH] IN BLOOD BY AUTOMATED COUNT: 18.3 % (ref 10–15)
GFR SERPL CREATININE-BSD FRML MDRD: 38 ML/MIN/{1.73_M2}
GLUCOSE SERPL-MCNC: 135 MG/DL (ref 70–99)
HCT VFR BLD AUTO: 33.2 % (ref 40–53)
HGB BLD-MCNC: 10.6 G/DL (ref 13.3–17.7)
MCH RBC QN AUTO: 23.5 PG (ref 26.5–33)
MCHC RBC AUTO-ENTMCNC: 31.9 G/DL (ref 31.5–36.5)
MCV RBC AUTO: 74 FL (ref 78–100)
PLATELET # BLD AUTO: 115 10E9/L (ref 150–450)
POTASSIUM SERPL-SCNC: 3.5 MMOL/L (ref 3.4–5.3)
RBC # BLD AUTO: 4.52 10E12/L (ref 4.4–5.9)
SODIUM SERPL-SCNC: 135 MMOL/L (ref 133–144)
WBC # BLD AUTO: 5 10E9/L (ref 4–11)

## 2020-07-05 PROCEDURE — 80048 BASIC METABOLIC PNL TOTAL CA: CPT | Performed by: HOSPITALIST

## 2020-07-05 PROCEDURE — 25000128 H RX IP 250 OP 636: Performed by: HOSPITALIST

## 2020-07-05 PROCEDURE — 36415 COLL VENOUS BLD VENIPUNCTURE: CPT | Performed by: HOSPITALIST

## 2020-07-05 PROCEDURE — 85027 COMPLETE CBC AUTOMATED: CPT | Performed by: HOSPITALIST

## 2020-07-05 PROCEDURE — 40000275 ZZH STATISTIC RCP TIME EA 10 MIN

## 2020-07-05 PROCEDURE — 94640 AIRWAY INHALATION TREATMENT: CPT

## 2020-07-05 PROCEDURE — 25000132 ZZH RX MED GY IP 250 OP 250 PS 637: Performed by: HOSPITALIST

## 2020-07-05 PROCEDURE — 94640 AIRWAY INHALATION TREATMENT: CPT | Mod: 76

## 2020-07-05 PROCEDURE — 99239 HOSP IP/OBS DSCHRG MGMT >30: CPT | Performed by: HOSPITALIST

## 2020-07-05 PROCEDURE — 25000125 ZZHC RX 250: Performed by: HOSPITALIST

## 2020-07-05 RX ORDER — METOPROLOL TARTRATE 50 MG
50 TABLET ORAL DAILY
Status: ON HOLD | COMMUNITY
Start: 2020-07-05 | End: 2020-07-16

## 2020-07-05 RX ORDER — LEVOFLOXACIN 750 MG/1
750 TABLET, FILM COATED ORAL EVERY OTHER DAY
Qty: 3 TABLET | Refills: 0 | Status: SHIPPED | OUTPATIENT
Start: 2020-07-05 | End: 2020-07-14

## 2020-07-05 RX ADMIN — ALBUTEROL SULFATE 2 PUFF: 108 INHALANT RESPIRATORY (INHALATION) at 05:59

## 2020-07-05 RX ADMIN — ALBUTEROL SULFATE 2 PUFF: 108 INHALANT RESPIRATORY (INHALATION) at 01:29

## 2020-07-05 RX ADMIN — PANTOPRAZOLE SODIUM 40 MG: 40 TABLET, DELAYED RELEASE ORAL at 06:46

## 2020-07-05 RX ADMIN — PIPERACILLIN SODIUM AND TAZOBACTAM SODIUM 3.38 G: 3; .375 INJECTION, POWDER, LYOPHILIZED, FOR SOLUTION INTRAVENOUS at 09:08

## 2020-07-05 RX ADMIN — DABIGATRAN ETEXILATE MESYLATE 75 MG: 75 CAPSULE ORAL at 08:12

## 2020-07-05 RX ADMIN — CALCIUM CARBONATE 600 MG (1,500 MG)-VITAMIN D3 400 UNIT TABLET 1 TABLET: at 08:12

## 2020-07-05 RX ADMIN — IPRATROPIUM BROMIDE AND ALBUTEROL SULFATE 3 ML: .5; 3 SOLUTION RESPIRATORY (INHALATION) at 07:00

## 2020-07-05 RX ADMIN — FERROUS SULFATE TAB 325 MG (65 MG ELEMENTAL FE) 325 MG: 325 (65 FE) TAB at 08:12

## 2020-07-05 RX ADMIN — IPRATROPIUM BROMIDE AND ALBUTEROL SULFATE 3 ML: .5; 3 SOLUTION RESPIRATORY (INHALATION) at 10:48

## 2020-07-05 RX ADMIN — PIPERACILLIN SODIUM AND TAZOBACTAM SODIUM 3.38 G: 3; .375 INJECTION, POWDER, LYOPHILIZED, FOR SOLUTION INTRAVENOUS at 03:10

## 2020-07-05 RX ADMIN — BUDESONIDE 0.5 MG: 0.5 INHALANT RESPIRATORY (INHALATION) at 07:00

## 2020-07-05 ASSESSMENT — ACTIVITIES OF DAILY LIVING (ADL)
ADLS_ACUITY_SCORE: 13

## 2020-07-05 ASSESSMENT — MIFFLIN-ST. JEOR: SCORE: 1358.95

## 2020-07-05 NOTE — PLAN OF CARE
COVID-19 NEGATIVE. Pt is [place], fluent in ASL, otherwise communicates w/ pen and paper.  present 0830 until discharge. A&Ox4. VSS on RA. Denies CLEARY, O2 sats 90% after activity and 94-96% at rest. LS expiratory wheezes at times, scheduled albuterol nebs given w/ desired outcome. Denies SOB, dizziness, nausea. Productive cough. IS at bedside, pt performing independently. Up SBA/independent. Voiding adequately w/o difficulty. Medium form BM this shift. Criselda reg diet. Reviewed discharge instructions and medication w/ pt. Pt verbalized understanding and denies further questions/concerns. Writer reviewed discharge instructions and med w/ pt son, Forrest, (with whom pt lives) over telephone who also verbalized understanding and denied further questions/concerns. Pt to discharge home via son.

## 2020-07-05 NOTE — DISCHARGE SUMMARY
[facility]    Discharge Summary  Hospitalist    Date of Admission:  7/3/2020  Date of Discharge:  7/5/2020  Discharging Provider: Arcadio Alford MD  Date of Service (when I saw the patient): 07/05/20    Discharge Diagnoses   Community acquired pneumonia  COVID-19 testing negative  Severe aortic stenosis  Left atrial appendage thrombosis  Atrial fibrillation  Chronic diastolic congestive heart failure  Elevated troponin  Recent upper GI bleed secondary to bleeding esophageal ulcer  Hypertension  Hyperlipidemia  Moderate persistent asthma  Chronic kidney disease, stage III  Senile hearing loss    History of Present Illness   Shiraz Ingram is a 85 year old male with a complex past medical history including critical aortic stenosis, coronary artery disease, atrial fibrillation, left atrial thrombus, recent GI bleed secondary to bleeding esophageal ulcer, senile hearing loss, chronic anemia, chronic diastolic congestive heart failure, hypertension, hyperlipidemia, moderate persistent asthma, and chronic kidney disease stage III who presented to the ER with fevers, cough, and shortness of breath.  After evaluation in the ER, there was concern for pneumonia.  He was started on antibiotics and admitted to the hospitalist service for further evaluation and management.    Hospital Course   Shiraz Ingram was admitted on 7/3/2020.  The following problems were addressed during his hospitalization:    Community acquired pneumonia  - CXR showed right mid lung and left lung base opacities consistent with pneumonia, as well as small bilateral pleural effusions.  - Increased risk of gram negative and multi-drug resistant organisms as this was his fourth admission to the hospital in the past 90 days. He was treated with IV Zosyn in the hospital. Will transition to oral levaquin at the time of discharge.  - Sputum culture negative.  - COVID-19 testing negative.  - Blood cultures pending, negative to date.  - Lactic  acid within normal limits.  - Initially required 2 lpm of supplemental oxygen to maintain O2 sats in the 90s. Supplemental oxygen has been weaned off.  - Shortness of breath much improved. Afebrile. He feels ready to go home.  - Discharge home today.  - Follow-up with PCP.  - Discussed reasons to seek medical attention prior to follow-up appointment.    COVID-19 testing negative  - COVID-19 testing was obtained in the ER and it was negative.  - He has been basically quarantining at home. He does live with several family members, however they do not have any known COVID-19 exposures.  - Based on his history, he appeared to be low risk for COVID-19. His symptoms can be explained by bacterial community acquired pneumonia.  COVID-19 testing returned negative and isolation precautions were discontinued.    Severe aortic stenosis  -Scheduled for TAVR on July 14 per report from patient's son. Patient's son states he has already called the clinic to let them know about the patient's admission.  -Recommend that they follow-up with his outpatient provider on Monday.    Left atrial appendage thrombosis  -Continue PTA Pradaxa. If GFR drops below 30, would need reassess options for anticoagulation.    Atrial fibrillation  -Not on any chronic rate or rhythm control medications.  Rate currently in the 80s ER.  -Continue PTA Pradaxa. If GFR drops below 30, would need reassess options for anticoagulation.    Chronic diastolic congestive heart failure  -Most recent echo in April 2020 showed an LVEF of 50 to 55%, moderate left ventricular hypertrophy, critical aortic stenosis, moderately decreased right ventricular systolic function.  -Appears to be close to euvolemic.  Chronic lower extremity edema is at baseline.  Weight has been stable.  -He was given a dose of IV Lasix in the ER, however we held off on further diuresis. He is not on a diuretic at baseline.    Elevated troponin  -Troponin slightly elevated at 0.088 in the ER, then  up to 0.238, then down to 0.125.  -Has had some slight chest discomfort with coughing, but no chest pain suggestive of ACS.  -Suspicion for ACS was low.  -Suspect bump in troponin was related due to demand ischemia in setting of his acute illness.    Recent upper GI bleed secondary to bleeding esophageal ulcer  -Was recently admitted from May 23 through May 30 due to an upper GI bleed from a bleeding esophageal ulcer.  -Continue PTA Protonix.  -Hemoglobin initially 12.6 at the time of admission, drop to 10.4 then stable at 10.6 on the day of discharge.  -Recheck CBC at follow-up appointment with PCP.    Hypertension  -Not on any chronic medications for blood pressure control.  -Blood pressure mildly elevated today.    Hyperlipidemia  -Continue PTA simvastatin.    Moderate persistent asthma  -Continue PTA nebulizers.    Chronic kidney disease, stage III  -Baseline creatinine is in the 1.4-1.7 range.  -Creatinine 1.29 in the ER, now up to 1.61 on the day of discharge.  -Recheck at follow-up appointment with PCP.    Senile hearing loss  -He uses American sign language to communicate.  - was used today.    Arcadio Alford MD    Significant Results and Procedures   None    Pending Results   These results will be followed up by PCP.  Unresulted Labs Ordered in the Past 30 Days of this Admission     Date and Time Order Name Status Description    7/3/2020 1938 Sputum Culture Aerobic Bacterial In process     7/3/2020 1550 Blood culture Preliminary     7/3/2020 1550 Blood culture Preliminary         Code Status   Full Code       Primary Care Physician   Dany Rodriguez MD    Physical Exam   Temp: 96.8  F (36  C) Temp src: Oral BP: 132/68 Pulse: 82 Heart Rate: 74 Resp: 18 SpO2: 94 % O2 Device: None (Room air) Oxygen Delivery: 1 LPM  Vitals:    07/03/20 1441 07/04/20 0447 07/05/20 0600   Weight: 70.8 kg (156 lb) 70.5 kg (155 lb 6.4 oz) 69.9 kg (154 lb 3.2 oz)     Vital Signs with Ranges  Temp:  [96.4   F (35.8  C)-97  F (36.1  C)] 96.8  F (36  C)  Pulse:  [78-85] 82  Heart Rate:  [74-86] 74  Resp:  [16-22] 18  BP: (120-153)/(63-69) 132/68  SpO2:  [89 %-99 %] 94 %  I/O last 3 completed shifts:  In: 950 [P.O.:950]  Out: 500 [Urine:500]    Constitutional: awake, alert, cooperative, no apparent distress  Respiratory: clear to auscultation bilaterally, no crackles or wheezing  Cardiovascular: regular rate and rhythm, normal S1 and S2, systolic murmur noted  GI: normal bowel sounds, soft, non-distended, non-tender  Skin: warm, dry  Musculoskeletal: no lower extremity pitting edema present  Neurologic: awake, alert, oriented to name, place and time    Discharge Disposition   Discharged to home  Condition at discharge: Stable    Consultations This Hospital Stay   None    Time Spent on this Encounter   I, Arcadio Alford MD, personally saw the patient today and spent greater than 30 minutes discharging this patient.    Discharge Orders      Reason for your hospital stay    You were in the hospital due to pneumonia.You were treated with antibiotics in the hospital and will be discharged home with antibiotics to complete treatment. We recommend that you follow-up with your primary care provider in 4-5 days for a check-up. If you develop fevers, shortness of breath, chest pain, diarrhea, or have other questions/concerns about your health, please seek medical attention.     Follow-up and recommended labs and tests     Follow up with primary care provider, Dany Rodriguez MD, within 4-5 days for hospital follow- up.  The following labs/tests are recommended: CBC and BMP.     Activity    Your activity upon discharge: activity as tolerated     When to contact your care team    Please contact the TAVR team on Monday to discuss your hospitalization for pneumonia and how this may affect the timing of your heart valve procedure.     Full Code     Diet    Follow this diet upon discharge: Regular Diet Adult     Discharge  Medications   Current Discharge Medication List      START taking these medications    Details   levofloxacin (LEVAQUIN) 750 MG tablet Take 1 tablet (750 mg) by mouth every other day for 5 days  Qty: 3 tablet, Refills: 0    Associated Diagnoses: Pneumonia due to infectious organism, unspecified laterality, unspecified part of lung         CONTINUE these medications which have CHANGED    Details   metoprolol tartrate (LOPRESSOR) 50 MG tablet Take 1 tablet (50 mg) by mouth daily --- HOLD UNTIL FOLLOW-UP WITH OUTPATIENT PROVIDERS ---  Qty:           CONTINUE these medications which have NOT CHANGED    Details   albuterol (PROAIR HFA) 108 (90 Base) MCG/ACT inhaler INHALE 1 TO 2 PUFFS EVERY 4 TO 6 HOURS AS NEEDED  Qty: 1 Inhaler, Refills: 11    Associated Diagnoses: Moderate persistent asthma, uncomplicated      budesonide (PULMICORT) 0.5 MG/2ML nebulizer solution Take 2 mLs (0.5 mg) by nebulization 2 times daily  Qty: 120 mL, Refills: 0    Associated Diagnoses: Moderate persistent asthma      calcium carbonate 600 mg-vitamin D 400 units (CALTRATE) 600-400 MG-UNIT per tablet Take 1 tablet by mouth daily      dabigatran ANTICOAGULANT (PRADAXA) 150 MG capsule Take 1 capsule (150 mg) by mouth 2 times daily Store in original 's bottle or blister pack; use within 120 days of opening.  Qty: 180 capsule, Refills: 3    Associated Diagnoses: Coronary artery disease involving native coronary artery of native heart without angina pectoris      ferrous sulfate (IRON) 325 (65 Fe) MG tablet TAKE 1 TABLET(325 MG) BY MOUTH TWICE DAILY  Qty: 180 tablet, Refills: 0    Associated Diagnoses: Anemia, unspecified type      ipratropium - albuterol 0.5 mg/2.5 mg/3 mL (DUONEB) 0.5-2.5 (3) MG/3ML nebulization Inhale 1 vial (3 mLs) into the lungs 4 times daily  Qty: 360 mL, Refills: 11    Associated Diagnoses: Moderate persistent asthma, uncomplicated      lisinopril (ZESTRIL) 20 MG tablet Take 20 mg by mouth daily      montelukast  (SINGULAIR) 10 MG tablet TAKE 1 TABLET(10 MG) BY MOUTH DAILY  Qty: 90 tablet, Refills: 1    Comments: **Patient requests 90 days supply**  Associated Diagnoses: Moderate persistent asthma      multivitamin, therapeutic (THERA-VIT) TABS tablet Take 1 tablet by mouth every evening       pantoprazole (PROTONIX) 40 MG EC tablet Take 1 tablet (40 mg) by mouth 2 times daily (before meals)  Qty: 60 tablet, Refills: 1    Associated Diagnoses: Ulcer of esophagus with bleeding      simvastatin (ZOCOR) 40 MG tablet TAKE 1 TABLET(40 MG) BY MOUTH AT BEDTIME  Qty: 90 tablet, Refills: 1    Associated Diagnoses: Hyperlipidemia with target LDL less than 100           Allergies   Allergies   Allergen Reactions     No Known Drug Allergies      Data   Most Recent 3 CBC's:  Recent Labs   Lab Test 07/05/20  0938 07/04/20  1703 07/04/20  0520 07/03/20  1558   WBC 5.0  --  5.8 7.6   HGB 10.6* 10.7* 10.4* 12.6*   MCV 74*  --  73* 74*   *  --  121* 133*      Most Recent 3 BMP's:  Recent Labs   Lab Test 07/05/20  0938 07/04/20  0520 07/03/20  1558    138 138   POTASSIUM 3.5 3.7 4.1   CHLORIDE 101 104 106   CO2 27 27 25   BUN 22 25 21   CR 1.61* 1.58* 1.29*   ANIONGAP 7 7 7   AYALA 9.2 9.0 10.0   * 111* 118*     Most Recent 2 LFT's:  Recent Labs   Lab Test 07/03/20  1558 06/03/20  1529   AST 21 21   ALT 23 20   ALKPHOS 79 59   BILITOTAL 1.4* 0.9     Most Recent 3 Troponin's:  Recent Labs   Lab Test 07/04/20  0520 07/03/20  2129 07/03/20  1558   TROPI 0.125* 0.238* 0.088*     Most Recent 6 Bacteria Isolates From Any Culture (See EPIC Reports for Culture Details):  Recent Labs   Lab Test 07/03/20  1558 01/29/17  1903 01/29/17  1810 02/19/13  2115 02/19/13  2110   CULT No growth after 2 days  No growth after 2 days No growth No growth No growth No growth     Results for orders placed or performed during the hospital encounter of 07/03/20   XR Chest Port 1 View    Narrative    CHEST ONE VIEW PORTABLE   7/3/2020 4:35 PM      HISTORY:  Cough. Dyspnea.    COMPARISON: 4/23/2020.      Impression    IMPRESSION: New ill-defined opacity in the right mid lung and  perihilar regions as well as at the left lung base, suspicious for  pneumonia. There are also likely small bilateral pleural effusions.  Sternotomy. Aortic calcification. Heart size appears stable. Pulmonary  vascularity is within normal limits. No pneumothorax.    CASH RICO MD

## 2020-07-05 NOTE — PLAN OF CARE
Pt is [place], using ASL and also communicates with w/pen and paper.  requested for tomorrow from [id number]. A&O x4, VSS, afebrile, on RA. During sleep pt de-sats to 88 %, now on 1LPM. Using IS, proper technique demonstrated. Productive cough. LS expiratory wheezes at times. No c/o chest pain, SOB or nausea. Voiding adequately. Ambulates SBA, tolerating regular diet. Discharge pending depends on clinical improvement.

## 2020-07-05 NOTE — PLAN OF CARE
A&OX4, VSS on RA. Weaned off this shift. Still with audible wheezing on scheduled nebs. Pt is deaf. Using ASL and written communication. Denies pain, n&V. Up SBA. On regular diet. Hgb recheck was stable at 10.7.  Sputum culture with mixed gram negative and positive katie.Discharge pending clinical improvement. PIV Sled. Continue to monitor.

## 2020-07-05 NOTE — PROVIDER NOTIFICATION
MD Notification    Notified Person: MD    Notified Person Name: Prashanth    Notification Date/Time: 0837 7/5/20    Notification Interaction: text page    Purpose of Notification: FYFARAZ  here now until noon. pt doing well, 02 94% on RA.     Orders Received: N/A, FYI

## 2020-07-06 ENCOUNTER — TELEPHONE (OUTPATIENT)
Dept: FAMILY MEDICINE | Facility: CLINIC | Age: 85
End: 2020-07-06

## 2020-07-06 ENCOUNTER — PATIENT OUTREACH (OUTPATIENT)
Dept: CARE COORDINATION | Facility: CLINIC | Age: 85
End: 2020-07-06

## 2020-07-06 DIAGNOSIS — J18.9 PNEUMONIA: Primary | ICD-10-CM

## 2020-07-06 NOTE — LETTER
[facility]ARE [facility]ORDIN[facility]  [address] [address]    July 6, 2020    Shiraz Ingram  [address] [facility] [address]      Dear Shiraz,    I am a clinic care coordinator who works with Dany Rodriguez MD, at [facility]. I have been trying to reach you recently to introduce Clinic Care Coordination and to see if there was anything I could assist you with.  Below is a description of clinic care coordination and how I can further assist you.      The clinic care coordination team is made up of a registered nurse,  and community health worker who understand the health care system. The goal of clinic care coordination is to help you manage your health and improve access to the health care system in the most efficient manner. The team can assist you in meeting your health care goals by providing education, coordinating services, strengthening the communication among your providers and supporting you with any resource needs.    Please feel free to contact the Community Health Worker, Mandy, at [phone] with any questions or concerns. We are focused on providing you with the highest-quality healthcare experience possible and that all starts with you.     Sincerely,     Georgette Mahoney RN  [facility] Primary Care-Care Coordination  [facility]  [facility]  [phone]

## 2020-07-06 NOTE — PROGRESS NOTES
"Clinic Care Coordination Contact  [facility]/Voicemail    Referral Source: IP Report  Clinical Data: Care Coordinator Outreach  Outreach attempted x 2 . Phone number is incorrect in chart. Called son, Willi, (Aurthorization to discuss found in media tab). Son does not recognize the phone number that I reviewed with him. Son does not know the number as he only communicates with him by text. Patient has access to technology where he can see messages on a TV screen but son does not know how to access that. My phone number is given to son and we reviewed care coordination services and he agrees to discuss this with his father. Son stated that he texted his father earlier today and \"he says he is doing fine\". Son agrees to call clinic when he is done with work to schedule a hospital follow up appointment to recheck pneumonia with his PCP this week as recommended by discharging provider.   Plan: Care Coordinator will send care coordination introduction letter with care coordinator contact information and explanation of care coordination services via mail. Care Coordinator will do no further outreaches at this time.    Georgette Mahoney RN  [facility] Primary Care-Care Coordination  [facility]  [facility]  [phone]        "

## 2020-07-06 NOTE — TELEPHONE ENCOUNTER
Phone number is incorrect in chart. Reached son who does not know his father's phone number. Please update chart per standard work. Thanks    Georgette Mahoney RN  [facility] Primary Care-Care Coordination  [facility]  [facility]  [phone]

## 2020-07-07 DIAGNOSIS — Z11.59 ENCOUNTER FOR SCREENING FOR OTHER VIRAL DISEASES: Primary | ICD-10-CM

## 2020-07-07 LAB
BACTERIA SPEC CULT: NORMAL
SPECIMEN SOURCE: NORMAL

## 2020-07-08 ENCOUNTER — OFFICE VISIT (OUTPATIENT)
Dept: FAMILY MEDICINE | Facility: CLINIC | Age: 85
End: 2020-07-08
Payer: COMMERCIAL

## 2020-07-08 VITALS
WEIGHT: 159 LBS | DIASTOLIC BLOOD PRESSURE: 87 MMHG | OXYGEN SATURATION: 96 % | TEMPERATURE: 100 F | HEART RATE: 74 BPM | SYSTOLIC BLOOD PRESSURE: 134 MMHG | BODY MASS INDEX: 24.18 KG/M2

## 2020-07-08 DIAGNOSIS — J18.9 COMMUNITY ACQUIRED PNEUMONIA, UNSPECIFIED LATERALITY: ICD-10-CM

## 2020-07-08 DIAGNOSIS — N18.30 CKD (CHRONIC KIDNEY DISEASE) STAGE 3, GFR 30-59 ML/MIN (H): ICD-10-CM

## 2020-07-08 DIAGNOSIS — Z87.19 HISTORY OF GI BLEED: ICD-10-CM

## 2020-07-08 LAB
ERYTHROCYTE [DISTWIDTH] IN BLOOD BY AUTOMATED COUNT: 19.8 % (ref 10–15)
HCT VFR BLD AUTO: 34.8 % (ref 40–53)
HGB BLD-MCNC: 11.2 G/DL (ref 13.3–17.7)
MCH RBC QN AUTO: 23.1 PG (ref 26.5–33)
MCHC RBC AUTO-ENTMCNC: 32.2 G/DL (ref 31.5–36.5)
MCV RBC AUTO: 72 FL (ref 78–100)
PLATELET # BLD AUTO: 160 10E9/L (ref 150–450)
RBC # BLD AUTO: 4.85 10E12/L (ref 4.4–5.9)
WBC # BLD AUTO: 4.7 10E9/L (ref 4–11)

## 2020-07-08 PROCEDURE — 36415 COLL VENOUS BLD VENIPUNCTURE: CPT | Performed by: FAMILY MEDICINE

## 2020-07-08 PROCEDURE — 85027 COMPLETE CBC AUTOMATED: CPT | Performed by: FAMILY MEDICINE

## 2020-07-08 PROCEDURE — 80048 BASIC METABOLIC PNL TOTAL CA: CPT | Performed by: FAMILY MEDICINE

## 2020-07-08 PROCEDURE — 99495 TRANSJ CARE MGMT MOD F2F 14D: CPT | Performed by: FAMILY MEDICINE

## 2020-07-08 NOTE — PROGRESS NOTES
Subjective     Shiraz Ingram is a 85 year old male who presents to clinic today for the following health issues:    [facility]     Hospital Follow-up Visit:    Hospital/Nursing Home/IP Rehab Facility: [facility]  Date of Admission: 07/03/2020  Date of Discharge: 07/05/2020  Reason(s) for Admission: pneumonia      Was your hospitalization related to COVID-19? No   Problems taking medications regularly:  None  Medication changes since discharge: None  Problems adhering to non-medication therapy:  None    Summary of hospitalization:  [facility] discharge summary reviewed  Diagnostic Tests/Treatments reviewed.  Follow up needed: none  Other Healthcare Providers Involved in Patient s Care:         None  Update since discharge: stable.   Post Discharge Medication Reconciliation: discharge medications reconciled, continue medications without change.  Plan of care communicated with patient and family                He has a mild cough and mild dyspnea with activity. Leg edema stable.   He is staying active.   No chest pain, fever, chills, rhinorrhea or nasal congestion.       Reviewed and updated as needed this visit by Provider         Review of Systems   Constitutional, HEENT, cardiovascular, pulmonary, gi and gu systems are negative, except as otherwise noted.      Objective    There were no vitals taken for this visit.  There is no height or weight on file to calculate BMI.  Physical Exam   /87   Pulse 74   Temp 100  F (37.8  C) (Oral)   Wt 72.1 kg (159 lb)   SpO2 96%   BMI 24.18 kg/m    GENERAL: healthy, alert and no distress  EYES: Eyes grossly normal to inspection  HENT: nose and mouth without ulcers or lesions  RESP: lungs clear to auscultation - no rales, rhonchi or wheezes  CV: regular rate and rhythm, normal S1 S2      Diagnostic Test Results:  none         Assessment & Plan     1. Community acquired pneumonia, unspecified laterality  - improving, complete abx course  - Follow if  symptoms worsen or fail to improve.    2. CKD (chronic kidney disease) stage 3, GFR 30-59 ml/min (H)  - Basic metabolic panel  (Ca, Cl, CO2, Creat, Gluc, K, Na, BUN)    3. History of GI bleed  - CBC with platelets    Return in about 1 week (around 7/15/2020) for sympotms are not improving.    Jose Biswas MD  [facility]

## 2020-07-09 LAB
ANION GAP SERPL CALCULATED.3IONS-SCNC: 6 MMOL/L (ref 3–14)
BACTERIA SPEC CULT: NO GROWTH
BACTERIA SPEC CULT: NO GROWTH
BUN SERPL-MCNC: 27 MG/DL (ref 7–30)
CALCIUM SERPL-MCNC: 9.3 MG/DL (ref 8.5–10.1)
CHLORIDE SERPL-SCNC: 106 MMOL/L (ref 94–109)
CO2 SERPL-SCNC: 27 MMOL/L (ref 20–32)
CREAT SERPL-MCNC: 1.53 MG/DL (ref 0.66–1.25)
GFR SERPL CREATININE-BSD FRML MDRD: 41 ML/MIN/{1.73_M2}
GLUCOSE SERPL-MCNC: 91 MG/DL (ref 70–99)
POTASSIUM SERPL-SCNC: 4.2 MMOL/L (ref 3.4–5.3)
SODIUM SERPL-SCNC: 139 MMOL/L (ref 133–144)
SPECIMEN SOURCE: NORMAL
SPECIMEN SOURCE: NORMAL

## 2020-07-10 ENCOUNTER — TELEPHONE (OUTPATIENT)
Dept: CARDIOLOGY | Facility: CLINIC | Age: 85
End: 2020-07-10

## 2020-07-10 NOTE — TELEPHONE ENCOUNTER

## 2020-07-12 DIAGNOSIS — Z11.59 ENCOUNTER FOR SCREENING FOR OTHER VIRAL DISEASES: ICD-10-CM

## 2020-07-12 PROCEDURE — U0003 INFECTIOUS AGENT DETECTION BY NUCLEIC ACID (DNA OR RNA); SEVERE ACUTE RESPIRATORY SYNDROME CORONAVIRUS 2 (SARS-COV-2) (CORONAVIRUS DISEASE [COVID-19]), AMPLIFIED PROBE TECHNIQUE, MAKING USE OF HIGH THROUGHPUT TECHNOLOGIES AS DESCRIBED BY CMS-2020-01-R: HCPCS | Performed by: INTERNAL MEDICINE

## 2020-07-13 ENCOUNTER — OFFICE VISIT (OUTPATIENT)
Dept: CARDIOLOGY | Facility: CLINIC | Age: 85
End: 2020-07-13
Payer: COMMERCIAL

## 2020-07-13 ENCOUNTER — NURSE TRIAGE (OUTPATIENT)
Dept: NURSING | Facility: CLINIC | Age: 85
End: 2020-07-13

## 2020-07-13 ENCOUNTER — HOSPITAL ENCOUNTER (OUTPATIENT)
Dept: LAB | Facility: CLINIC | Age: 85
Discharge: HOME | DRG: 267 | End: 2020-07-13
Attending: INTERNAL MEDICINE | Admitting: INTERNAL MEDICINE
Payer: COMMERCIAL

## 2020-07-13 ENCOUNTER — CARE COORDINATION (OUTPATIENT)
Dept: CARDIOLOGY | Facility: CLINIC | Age: 85
End: 2020-07-13

## 2020-07-13 VITALS
BODY MASS INDEX: 23.95 KG/M2 | WEIGHT: 158 LBS | HEART RATE: 56 BPM | SYSTOLIC BLOOD PRESSURE: 132 MMHG | DIASTOLIC BLOOD PRESSURE: 64 MMHG | HEIGHT: 68 IN

## 2020-07-13 DIAGNOSIS — I35.0 SEVERE AORTIC STENOSIS: Primary | ICD-10-CM

## 2020-07-13 DIAGNOSIS — I51.3 THROMBUS OF LEFT ATRIAL APPENDAGE: ICD-10-CM

## 2020-07-13 DIAGNOSIS — I10 HYPERTENSION GOAL BP (BLOOD PRESSURE) < 140/90: ICD-10-CM

## 2020-07-13 DIAGNOSIS — I48.21 PERMANENT ATRIAL FIBRILLATION (H): ICD-10-CM

## 2020-07-13 DIAGNOSIS — I35.0 SEVERE AORTIC STENOSIS: ICD-10-CM

## 2020-07-13 DIAGNOSIS — E78.5 HYPERLIPIDEMIA WITH TARGET LDL LESS THAN 100: ICD-10-CM

## 2020-07-13 DIAGNOSIS — I25.10 CORONARY ARTERY DISEASE INVOLVING NATIVE CORONARY ARTERY OF NATIVE HEART WITHOUT ANGINA PECTORIS: ICD-10-CM

## 2020-07-13 LAB
ALBUMIN SERPL-MCNC: 3.6 G/DL (ref 3.4–5)
ALP SERPL-CCNC: 82 U/L (ref 40–150)
ALT SERPL W P-5'-P-CCNC: 28 U/L (ref 0–70)
ANION GAP SERPL CALCULATED.3IONS-SCNC: 7 MMOL/L (ref 3–14)
AST SERPL W P-5'-P-CCNC: 23 U/L (ref 0–45)
BILIRUB SERPL-MCNC: 0.6 MG/DL (ref 0.2–1.3)
BUN SERPL-MCNC: 26 MG/DL (ref 7–30)
CALCIUM SERPL-MCNC: 9.4 MG/DL (ref 8.5–10.1)
CHLORIDE SERPL-SCNC: 109 MMOL/L (ref 94–109)
CO2 SERPL-SCNC: 26 MMOL/L (ref 20–32)
CREAT SERPL-MCNC: 1.4 MG/DL (ref 0.66–1.25)
ERYTHROCYTE [DISTWIDTH] IN BLOOD BY AUTOMATED COUNT: 18.6 % (ref 10–15)
GFR SERPL CREATININE-BSD FRML MDRD: 45 ML/MIN/{1.73_M2}
GLUCOSE SERPL-MCNC: 98 MG/DL (ref 70–99)
HCT VFR BLD AUTO: 36.6 % (ref 40–53)
HGB BLD-MCNC: 11.5 G/DL (ref 13.3–17.7)
INR PPP: 1.21 (ref 0.86–1.14)
MCH RBC QN AUTO: 22.6 PG (ref 26.5–33)
MCHC RBC AUTO-ENTMCNC: 31.4 G/DL (ref 31.5–36.5)
MCV RBC AUTO: 72 FL (ref 78–100)
PLATELET # BLD AUTO: 168 10E9/L (ref 150–450)
POTASSIUM SERPL-SCNC: 4.2 MMOL/L (ref 3.4–5.3)
PROT SERPL-MCNC: 7.8 G/DL (ref 6.8–8.8)
RBC # BLD AUTO: 5.09 10E12/L (ref 4.4–5.9)
SARS-COV-2 RNA SPEC QL NAA+PROBE: NOT DETECTED
SODIUM SERPL-SCNC: 142 MMOL/L (ref 133–144)
SPECIMEN SOURCE: NORMAL
WBC # BLD AUTO: 3.6 10E9/L (ref 4–11)

## 2020-07-13 PROCEDURE — 86900 BLOOD TYPING SEROLOGIC ABO: CPT | Performed by: INTERNAL MEDICINE

## 2020-07-13 PROCEDURE — 80053 COMPREHEN METABOLIC PANEL: CPT | Performed by: INTERNAL MEDICINE

## 2020-07-13 PROCEDURE — 85027 COMPLETE CBC AUTOMATED: CPT | Performed by: INTERNAL MEDICINE

## 2020-07-13 PROCEDURE — 86850 RBC ANTIBODY SCREEN: CPT | Performed by: INTERNAL MEDICINE

## 2020-07-13 PROCEDURE — 36415 COLL VENOUS BLD VENIPUNCTURE: CPT | Performed by: INTERNAL MEDICINE

## 2020-07-13 PROCEDURE — 85610 PROTHROMBIN TIME: CPT | Performed by: INTERNAL MEDICINE

## 2020-07-13 PROCEDURE — 99214 OFFICE O/P EST MOD 30 MIN: CPT | Performed by: NURSE PRACTITIONER

## 2020-07-13 PROCEDURE — 86923 COMPATIBILITY TEST ELECTRIC: CPT | Performed by: INTERNAL MEDICINE

## 2020-07-13 PROCEDURE — 86901 BLOOD TYPING SEROLOGIC RH(D): CPT | Performed by: INTERNAL MEDICINE

## 2020-07-13 RX ORDER — ASPIRIN 81 MG/1
81 TABLET ORAL DAILY
Status: CANCELLED | OUTPATIENT
Start: 2020-07-13

## 2020-07-13 RX ORDER — SODIUM CHLORIDE 9 MG/ML
INJECTION, SOLUTION INTRAVENOUS CONTINUOUS
Status: CANCELLED | OUTPATIENT
Start: 2020-07-13

## 2020-07-13 RX ORDER — CEFAZOLIN SODIUM 2 G/100ML
2 INJECTION, SOLUTION INTRAVENOUS
Status: CANCELLED | OUTPATIENT
Start: 2020-07-13

## 2020-07-13 RX ORDER — LIDOCAINE 40 MG/G
CREAM TOPICAL
Status: CANCELLED | OUTPATIENT
Start: 2020-07-13

## 2020-07-13 ASSESSMENT — MIFFLIN-ST. JEOR: SCORE: 1368.24

## 2020-07-13 NOTE — PROGRESS NOTES
STRUCTURAL HEART CARE   pre-TAVR H&P     Primary Cardiologist: Dr. Emir ZAVALA   Shiraz Ingram is a 85 year old deaf male who presents for pre-operative H&P accompanied by his son and [place]  in preparation for elective TAVR on 7/14/20 at [facility].     He carries a past medical history notable for aortic stenosis, CAD s/p CABG ( LIMA to LAD, vein graft to OM and RCA, atrial fibrillation w/ MAYA thrombus on chronic anticoagulation, hearing loss, chromic anemia, chronic diastolic heart failure, hypertension, asthma, chronic kidney disease stage III, and recent GI bleed requiring transfusion.    On 3/16/2020, he was evaluated by our structural heart team for severe symptomatic aortic stenosis.  His echo showed a valve area of 0.5 cm  with a mean gradient of 52 mmHg and a peak aortic velocity of 4.9 m/s.  He subsequently underwent TAVR work-up consisting of a TAVR CT showing a aortic valve calcium score of 4435.  He was also found to have nonspecific mediastinal and right hilar lymphadenopathy, scattered bilateral pulmonary nodules measuring up to 6 mm, and multiple renal masses concerning for malignancy.  Follow-up ALINE demonstrated a left atrial appendage thrombus.  He was recommended to switch from Xarelto to Coumadin, but due to COVID 19 and patient needing frequent INRs, he was transitioned to Pradaxa as it targets a different step in the coagulation pathway.  Right and left heart catheterization demonstrated severe three-vessel coronary disease, moderate to severe lesion of the right PDA branch of the SVG- right PDA- right PL Y graft, widely patent LIMA to the LAD, SVG to the OM.  Right heart cath showed moderately elevated right heart pressures, moderately elevated wedge pressure, and low -normal cardiac index.     Unfortunately, over the last few months he has had multiple hospital admissions, with the most recent from July 3- 5th with community-acquired pneumonia.  He  was treated aggressively with IV antibiotics followed by a course of Levaquin at the time of discharge.  COVID-19 test was negative.  He did have a mild troponin elevation which was felt to be demand ischemia.    Today he is feeling well on a cardiac standpoint with the exception of exertional shortness of breath.  He denies chest pain, palpitations, PND, orthopnea, presyncope, syncope, and mild lower extremity edema.    Blood pressure is well controlled at 132/64, managed on lisinopril  Upon exam, left lower lobe mildly decreased, heart rhythm irregular, rate controlled, loud systolic murmur, no abdominal distention or elevated neck veins, trace right ankle edema.  Of note, metoprolol has been held due to bradycardia.  He was evaluated by EP during his last admission who did not feel he met criteria for pacemaker at that time.    Labs today showed a sodium of 142, potassium 4.2, BUN 26, creatinine 1.4, GFR 45  Hemoglobin 11.5 and platelet 168.    Activity level unchanged  Eating and sleeping well  Compliant with all medications.      Assessment and Plan     1.  Severe Aortic Stenosis - echo showed a valve area of 0.5 cm  with a mean gradient of 52 mmHg and a peak aortic velocity of 4.9 m/s.  He has been evaluated in our structural heart clinic and undergone TAVR work-up which is now complete.  He is being recommended for elective TAVR scheduled for 7/14/2020. Risks and benefits of transcatheter aortic valve replacement procedure were discussed today including, bleeding at access site, bruising, infection, allergic reaction, kidney damage (including need for dialysis), stroke, heart attack, vascular damage, arrhythmic abnormalities including high degree AV block (requiring Pacemaker implant) or atrial fibrillation,  emergency open heart surgery, up to and including death.  Patient and son verbally understand the risks and benefits of the procedure and willing to proceed as scheduled.  He will remain n.p.o. after  midnight tonight.  Okay to take a.m. medications with a small sip of water.  Pradaxa currently on hold.     History of blood transfusions/reactions: yes/No  History of abnormal bleeding/anti-platelet use: yes  Steroid use in the last year: No  Personal or family history with difficulty with anesthesia: No     Type and screen orders completed. Supplies for scrubbing provided.   Patient has no fever, chills, or urinary symptoms. Patient does not have contrast dye allergy.   Patient is optimized and is acceptable candidate for the proposed procedure.  No further diagnostic evaluation is needed. Pre-TAVR instructions provided in written format.     Medication Recommendations:  Pradaxa on hold.   Metoprolol on hold.     2. CAD - s/p remote hx of CABG. Recent coronary angiogram demonstrated severe three-vessel coronary disease, moderate to severe lesion of the right PDA branch of the SVG- right PDA- right PL Y graft, widely patent LIMA to the LAD, SVG to the OM.  Continue aspirin, and statin.     3. Atrial fibrillation/ MAYA thrombus. - On no rate controlling agents due to bradycardia. DOAC on hold for procedure     4.  Hypertension - Well controlled  5. Hyperlipidemia - On statin   6. CKD - Creatinine 1.4  7. Asthma - Inhaler and nebulizer use.   8. Hearing Impaired       Georgette Polk, EVA, CNP  7/13/2020    Current Medications:  Current Outpatient Medications   Medication Sig Dispense Refill     albuterol (PROAIR HFA) 108 (90 Base) MCG/ACT inhaler INHALE 1 TO 2 PUFFS EVERY 4 TO 6 HOURS AS NEEDED 1 Inhaler 11     aspirin (ASA) 81 MG EC tablet Take 81 mg by mouth daily       budesonide (PULMICORT) 0.5 MG/2ML nebulizer solution Take 2 mLs (0.5 mg) by nebulization 2 times daily 120 mL 0     calcium carbonate 600 mg-vitamin D 400 units (CALTRATE) 600-400 MG-UNIT per tablet Take 1 tablet by mouth daily       dabigatran ANTICOAGULANT (PRADAXA) 150 MG capsule Take 1 capsule (150 mg) by mouth 2 times daily Store in original  's bottle or blister pack; use within 120 days of opening. 180 capsule 3     ferrous sulfate (IRON) 325 (65 Fe) MG tablet TAKE 1 TABLET(325 MG) BY MOUTH TWICE DAILY 180 tablet 0     ipratropium - albuterol 0.5 mg/2.5 mg/3 mL (DUONEB) 0.5-2.5 (3) MG/3ML nebulization Inhale 1 vial (3 mLs) into the lungs 4 times daily 360 mL 11     lisinopril (ZESTRIL) 20 MG tablet Take 20 mg by mouth daily       metoprolol tartrate (LOPRESSOR) 50 MG tablet Take 1 tablet (50 mg) by mouth daily --- HOLD UNTIL FOLLOW-UP WITH OUTPATIENT PROVIDERS ---       multivitamin, therapeutic (THERA-VIT) TABS tablet Take 1 tablet by mouth every evening        pantoprazole (PROTONIX) 40 MG EC tablet Take 1 tablet (40 mg) by mouth 2 times daily (before meals) 60 tablet 1     simvastatin (ZOCOR) 40 MG tablet TAKE 1 TABLET(40 MG) BY MOUTH AT BEDTIME 90 tablet 1     montelukast (SINGULAIR) 10 MG tablet TAKE 1 TABLET(10 MG) BY MOUTH DAILY 90 tablet 1       Allergies:     Allergies   Allergen Reactions     No Known Drug Allergies        Past Medical History:  Past Medical History:   Diagnosis Date     Allergic rhinitis, cause unspecified      Anemia, unspecified      Aortic stenosis     severe     Benign neoplasm of colon 1999    Ademonatous polyp     CKD (chronic kidney disease) stage 3, GFR 30-59 ml/min (H) 5/12/2011     Coronary atherosclerosis of unspecified type of vessel, native or graft     s/p CABG 2002     Hyperlipidemia LDL goal < 100      Hypertension goal BP (blood pressure) < 140/90      Localized osteoarthrosis not specified whether primary or secondary, lower leg     left knee     Moderate persistent asthma      Persistent atrial fibrillation (H)      Unspecified hearing loss        Past Surgical History:  Past Surgical History:   Procedure Laterality Date     C NONSPECIFIC PROCEDURE  5/02    Coronary artery bypass     CARDIAC SURGERY       COLONOSCOPY N/A 5/26/2020    Procedure: COLONOSCOPY;  Surgeon: Ignacio Fournier MD;   Location:  GI     CV ANGIOGRAM CORONARY GRAFT N/A 2020    Procedure: Angiogram Coronary Graft;  Surgeon: Addison Willard MD;  Location:  HEART CARDIAC CATH LAB     CV CORONARY ANGIOGRAM N/A 2020    Procedure: Coronary Angiogram;  Surgeon: Addison Willard MD;  Location:  HEART CARDIAC CATH LAB     CV RIGHT HEART CATH N/A 2020    Procedure: Right Heart Cath;  Surgeon: Addison Willard MD;  Location:  HEART CARDIAC CATH LAB     HC COLONOSCOPY THRU STOMA W BIOPSY/CAUTERY TUMOR/POLYP/LESION      Dr. Dow, Q 5 years     HC COLONOSCOPY THRU STOMA W BIOPSY/CAUTERY TUMOR/POLYP/LESION      Dr. Dow, one adenomatous polyp     HC ESOPHAGOSCOPY, DIAGNOSTIC      Dr. Dow, lower esophageal ring & mild gastritis     HERNIORRHAPHY INGUINAL Right 2016    Procedure: HERNIORRHAPHY INGUINAL;  Surgeon: Alexis Alexandra MD;  Location: [facility]     LAPAROSCOPIC CHOLECYSTECTOMY      for biliary sludge       Family History:  Family History   Problem Relation Age of Onset     C.A.D. Father      Cancer Mother         breast cancer,  of pneumonia     Cerebrovascular Disease Son      CABG Son      Family History Negative Child      Family History Negative Sister      Heart Disease Brother        Social History:  Social History     Socioeconomic History     Marital status:      Spouse name: Kailey     Number of children: 3     Years of education: None     Highest education level: None   Occupational History     Occupation: upholstery     Employer: RETIRED   Social Needs     Financial resource strain: None     Food insecurity     Worry: None     Inability: None     Transportation needs     Medical: None     Non-medical: None   Tobacco Use     Smoking status: Former Smoker     Types: Cigarettes     Smokeless tobacco: Never Used     Tobacco comment: smoked for a week in    Substance and Sexual Activity     Alcohol use: No     Alcohol/week: 0.0 standard drinks      Drug use: No     Sexual activity: Yes     Partners: Female   Lifestyle     Physical activity     Days per week: None     Minutes per session: None     Stress: None   Relationships     Social connections     Talks on phone: None     Gets together: None     Attends Anabaptism service: None     Active member of club or organization: None     Attends meetings of clubs or organizations: None     Relationship status: None     Intimate partner violence     Fear of current or ex partner: None     Emotionally abused: None     Physically abused: None     Forced sexual activity: None   Other Topics Concern      Service No     Blood Transfusions No     Caffeine Concern Not Asked     Occupational Exposure Not Asked     Hobby Hazards Not Asked     Sleep Concern Not Asked     Stress Concern Not Asked     Weight Concern Not Asked     Special Diet Not Asked     Back Care Not Asked     Exercise Yes     Bike Helmet Not Asked     Seat Belt Yes     Self-Exams No     Parent/sibling w/ CABG, MI or angioplasty before 65F 55M? Yes   Social History Narrative    Balanced Diet - Yes    Osteoporosis Preventative measures-  Dairy servings per day: 2 to 3 servings daily    Regular Exercise -  Yes Describe walks 1.5 mile daily     Dental Exam up - YES - Date: 2 years ago    Eye Exam - YES - Date: 1 year ago    Self Testicular Exam -  No, handout given    Do you have any concerns about STD's -  No    Abuse: Current or Past (Physical, Sexual or Emotional)- No    Do you feel safe in your environment - Yes    Guns stored in the home - Yes, locked    Sunscreen used - No    Seatbelts used - Yes    Lipids - YES - Date: 3-09    Glucose -  YES - Date: 3-09    Colon Cancer Screening - Colonoscopy 3-08(date completed)    Hemoccults - UNKNOWN    PSA - YES - Date: 11-07    Digital Rectal Exam - UNKNOWN    Immunizations reviewed and up to date - Yes, td 1-2005, had shingles vaccine    5-28-09  JANEY Patel CMA                       Review of  "Systems:  Constitutional: No fever, chills, or sweats. No weight gain/loss   ENT: No visual disturbance, ear ache, epistaxis, sore throat. Deaf  Allergies/Immunologic: Negative.   Respiratory: No cough, hemoptysia  Cardiovascular: As per HPI  GI: No nausea, vomiting, hematemesis, melena, or hematochezia  : No urinary frequency, dysuria, or hematuria  Integument: Negative  Psychiatric: Negative  Neuro: Negative  Endocrinology: Negative   Musculoskeletal: Negative      Physical Exam:  Vitals: /64   Pulse 56   Ht 1.715 m (5' 7.5\")   Wt 71.7 kg (158 lb)   BMI 24.38 kg/m     In general, the patient is a pleasant male in no apparent distress.    HEENT: NC/AT.  PERRLA.  EOMI.  Sclerae white, not injected.  Nares clear.  Pharynx without erythema or exudate.  Dentition intact.    Neck: No adenopathy.  No thyromegaly. Carotids +4/4 bilaterally without bruits.  No jugular venous distension.   Heart: Irregular rhythm, reg rate. 4/6 systolic murmur, no rub, click, or gallop.   Lungs: CTA.  No ronchi, wheezes, rales.  Decreased LLL breath sounds  Abdomen: Soft, nontender, nondistended. No organomegaly.  No bruits.   Extremities: No clubbing, cyanosis, or edema.  The pulses are +4/4 at the radial, brachial, femoral, popliteal, DP, and PT sites bilaterally.  No bruits are noted.  Neurologic: Alert and oriented to person/place/time, hearing impaired, uses sign language  Skin: No petechiae, purpura or rash.      Laboratory Studies:  LIPID RESULTS:  Lab Results   Component Value Date    CHOL 119 06/01/2020    HDL 48 06/01/2020    LDL 54 06/01/2020    TRIG 87 06/01/2020    CHOLHDLRATIO 3.0 07/07/2015       LIVER ENZYME RESULTS:  Lab Results   Component Value Date    AST 23 07/13/2020    ALT 28 07/13/2020       CBC RESULTS:  Lab Results   Component Value Date    WBC 3.6 (L) 07/13/2020    RBC 5.09 07/13/2020    HGB 11.5 (L) 07/13/2020    HCT 36.6 (L) 07/13/2020    MCV 72 (L) 07/13/2020    MCH 22.6 (L) 07/13/2020    MCHC 31.4 " (L) 07/13/2020    RDW 18.6 (H) 07/13/2020     07/13/2020       BMP RESULTS:  Lab Results   Component Value Date     07/13/2020    POTASSIUM 4.2 07/13/2020    CHLORIDE 109 07/13/2020    CO2 26 07/13/2020    ANIONGAP 7 07/13/2020    GLC 98 07/13/2020    BUN 26 07/13/2020    CR 1.40 (H) 07/13/2020    GFRESTIMATED 45 (L) 07/13/2020    GFRESTBLACK 52 (L) 07/13/2020    AYALA 9.4 07/13/2020        A1C RESULTS:  No results found for: A1C    INR RESULTS:  Lab Results   Component Value Date    INR 1.21 (H) 07/13/2020    INR 1.25 (H) 06/03/2020

## 2020-07-13 NOTE — LETTER
7/13/2020    Dany Rodriguez MD, MD  [address] [place] [id number]    RE: Shiraz Ingram       Dear Colleague,    I had the pleasure of seeing Shiraz Ingram in the [facility].      STRUCTURAL HEART CARE   pre-TAVR H&P     Primary Cardiologist: Dr. Emir ZAVALA   Shiraz Ingram is a 85 year old deaf male who presents for pre-operative H&P accompanied by his son and Palisade  in preparation for elective TAVR on 7/14/20 at [facility].     He carries a past medical history notable for aortic stenosis, CAD s/p CABG ( LIMA to LAD, vein graft to OM and RCA, atrial fibrillation w/ MAYA thrombus on chronic anticoagulation, hearing loss, chromic anemia, chronic diastolic heart failure, hypertension, asthma, chronic kidney disease stage III, and recent GI bleed requiring transfusion.    On 3/16/2020, he was evaluated by our structural heart team for severe symptomatic aortic stenosis.  His echo showed a valve area of 0.5 cm  with a mean gradient of 52 mmHg and a peak aortic velocity of 4.9 m/s.  He subsequently underwent TAVR work-up consisting of a TAVR CT showing a aortic valve calcium score of 4435.  He was also found to have nonspecific mediastinal and right hilar lymphadenopathy, scattered bilateral pulmonary nodules measuring up to 6 mm, and multiple renal masses concerning for malignancy.  Follow-up ALINE demonstrated a left atrial appendage thrombus.  He was recommended to switch from Xarelto to Coumadin, but due to COVID 19 and patient needing frequent INRs, he was transitioned to Pradaxa as it targets a different step in the coagulation pathway.  Right and left heart catheterization demonstrated severe three-vessel coronary disease, moderate to severe lesion of the right PDA branch of the SVG- right PDA- right PL Y graft, widely patent LIMA to the LAD, SVG to the OM.  Right heart cath showed moderately elevated right heart pressures,  moderately elevated wedge pressure, and low -normal cardiac index.     Unfortunately, over the last few months he has had multiple hospital admissions, with the most recent from July 3- 5th with community-acquired pneumonia.  He was treated aggressively with IV antibiotics followed by a course of Levaquin at the time of discharge.  COVID-19 test was negative.  He did have a mild troponin elevation which was felt to be demand ischemia.    Today he is feeling well on a cardiac standpoint with the exception of exertional shortness of breath.  He denies chest pain, palpitations, PND, orthopnea, presyncope, syncope, and mild lower extremity edema.    Blood pressure is well controlled at 132/64, managed on lisinopril  Upon exam, left lower lobe mildly decreased, heart rhythm irregular, rate controlled, loud systolic murmur, no abdominal distention or elevated neck veins, trace right ankle edema.  Of note, metoprolol has been held due to bradycardia.  He was evaluated by EP during his last admission who did not feel he met criteria for pacemaker at that time.    Labs today showed a sodium of 142, potassium 4.2, BUN 26, creatinine 1.4, GFR 45  Hemoglobin 11.5 and platelet 168.    Activity level unchanged  Eating and sleeping well  Compliant with all medications.      Assessment and Plan     1.  Severe Aortic Stenosis - echo showed a valve area of 0.5 cm  with a mean gradient of 52 mmHg and a peak aortic velocity of 4.9 m/s.  He has been evaluated in our structural heart clinic and undergone TAVR work-up which is now complete.  He is being recommended for elective TAVR scheduled for 7/14/2020. Risks and benefits of transcatheter aortic valve replacement procedure were discussed today including, bleeding at access site, bruising, infection, allergic reaction, kidney damage (including need for dialysis), stroke, heart attack, vascular damage, arrhythmic abnormalities including high degree AV block (requiring Pacemaker implant)  or atrial fibrillation,  emergency open heart surgery, up to and including death.  Patient and son verbally understand the risks and benefits of the procedure and willing to proceed as scheduled.  He will remain n.p.o. after midnight tonight.  Okay to take a.m. medications with a small sip of water.  Pradaxa currently on hold.     History of blood transfusions/reactions: yes/No  History of abnormal bleeding/anti-platelet use: yes  Steroid use in the last year: No  Personal or family history with difficulty with anesthesia: No     Type and screen orders completed. Supplies for scrubbing provided.   Patient has no fever, chills, or urinary symptoms. Patient does not have contrast dye allergy.   Patient is optimized and is acceptable candidate for the proposed procedure.  No further diagnostic evaluation is needed. Pre-TAVR instructions provided in written format.     Medication Recommendations:  Pradaxa on hold.   Metoprolol on hold.     2. CAD - s/p remote hx of CABG. Recent coronary angiogram demonstrated severe three-vessel coronary disease, moderate to severe lesion of the right PDA branch of the SVG- right PDA- right PL Y graft, widely patent LIMA to the LAD, SVG to the OM.  Continue aspirin, and statin.     3. Atrial fibrillation/ MAYA thrombus. - On no rate controlling agents due to bradycardia. DOAC on hold for procedure     4.  Hypertension - Well controlled  5. Hyperlipidemia - On statin   6. CKD - Creatinine 1.4  7. Asthma - Inhaler and nebulizer use.   8. Hearing Impaired       EVA Ruvalcaba, CNP  7/13/2020    Current Medications:  Current Outpatient Medications   Medication Sig Dispense Refill     albuterol (PROAIR HFA) 108 (90 Base) MCG/ACT inhaler INHALE 1 TO 2 PUFFS EVERY 4 TO 6 HOURS AS NEEDED 1 Inhaler 11     aspirin (ASA) 81 MG EC tablet Take 81 mg by mouth daily       budesonide (PULMICORT) 0.5 MG/2ML nebulizer solution Take 2 mLs (0.5 mg) by nebulization 2 times daily 120 mL 0     calcium  carbonate 600 mg-vitamin D 400 units (CALTRATE) 600-400 MG-UNIT per tablet Take 1 tablet by mouth daily       dabigatran ANTICOAGULANT (PRADAXA) 150 MG capsule Take 1 capsule (150 mg) by mouth 2 times daily Store in original 's bottle or blister pack; use within 120 days of opening. 180 capsule 3     ferrous sulfate (IRON) 325 (65 Fe) MG tablet TAKE 1 TABLET(325 MG) BY MOUTH TWICE DAILY 180 tablet 0     ipratropium - albuterol 0.5 mg/2.5 mg/3 mL (DUONEB) 0.5-2.5 (3) MG/3ML nebulization Inhale 1 vial (3 mLs) into the lungs 4 times daily 360 mL 11     lisinopril (ZESTRIL) 20 MG tablet Take 20 mg by mouth daily       metoprolol tartrate (LOPRESSOR) 50 MG tablet Take 1 tablet (50 mg) by mouth daily --- HOLD UNTIL FOLLOW-UP WITH OUTPATIENT PROVIDERS ---       multivitamin, therapeutic (THERA-VIT) TABS tablet Take 1 tablet by mouth every evening        pantoprazole (PROTONIX) 40 MG EC tablet Take 1 tablet (40 mg) by mouth 2 times daily (before meals) 60 tablet 1     simvastatin (ZOCOR) 40 MG tablet TAKE 1 TABLET(40 MG) BY MOUTH AT BEDTIME 90 tablet 1     montelukast (SINGULAIR) 10 MG tablet TAKE 1 TABLET(10 MG) BY MOUTH DAILY 90 tablet 1       Allergies:     Allergies   Allergen Reactions     No Known Drug Allergies        Past Medical History:  Past Medical History:   Diagnosis Date     Allergic rhinitis, cause unspecified      Anemia, unspecified      Aortic stenosis     severe     Benign neoplasm of colon 1999    Ademonatous polyp     CKD (chronic kidney disease) stage 3, GFR 30-59 ml/min (H) 5/12/2011     Coronary atherosclerosis of unspecified type of vessel, native or graft     s/p CABG 2002     Hyperlipidemia LDL goal < 100      Hypertension goal BP (blood pressure) < 140/90      Localized osteoarthrosis not specified whether primary or secondary, lower leg     left knee     Moderate persistent asthma      Persistent atrial fibrillation (H)      Unspecified hearing loss        Past Surgical  History:  Past Surgical History:   Procedure Laterality Date     C NONSPECIFIC PROCEDURE      Coronary artery bypass     CARDIAC SURGERY       COLONOSCOPY N/A 2020    Procedure: COLONOSCOPY;  Surgeon: Ignacio Fournier MD;  Location:  GI     CV ANGIOGRAM CORONARY GRAFT N/A 2020    Procedure: Angiogram Coronary Graft;  Surgeon: Addison Willard MD;  Location:  HEART CARDIAC CATH LAB     CV CORONARY ANGIOGRAM N/A 2020    Procedure: Coronary Angiogram;  Surgeon: Addison Willard MD;  Location:  HEART CARDIAC CATH LAB     CV RIGHT HEART CATH N/A 2020    Procedure: Right Heart Cath;  Surgeon: Addison Willard MD;  Location:  HEART CARDIAC CATH LAB     HC COLONOSCOPY THRU STOMA W BIOPSY/CAUTERY TUMOR/POLYP/LESION      Dr. Dow, Q 5 years     HC COLONOSCOPY THRU STOMA W BIOPSY/CAUTERY TUMOR/POLYP/LESION      Dr. Dow, one adenomatous polyp     HC ESOPHAGOSCOPY, DIAGNOSTIC      Dr. Dow, lower esophageal ring & mild gastritis     HERNIORRHAPHY INGUINAL Right 2016    Procedure: HERNIORRHAPHY INGUINAL;  Surgeon: Alexis Alexandra MD;  Location: [facility]     LAPAROSCOPIC CHOLECYSTECTOMY      for biliary sludge       Family History:  Family History   Problem Relation Age of Onset     C.A.D. Father      Cancer Mother         breast cancer,  of pneumonia     Cerebrovascular Disease Son      CABG Son      Family History Negative Child      Family History Negative Sister      Heart Disease Brother        Social History:  Social History     Socioeconomic History     Marital status:      Spouse name: Kailey     Number of children: 3     Years of education: None     Highest education level: None   Occupational History     Occupation: [employer]     Employer: RETIRED   Social Needs     Financial resource strain: None     Food insecurity     Worry: None     Inability: None     Transportation needs     Medical: None     Non-medical: None    Tobacco Use     Smoking status: Former Smoker     Types: Cigarettes     Smokeless tobacco: Never Used     Tobacco comment: smoked for a week in 20's   Substance and Sexual Activity     Alcohol use: No     Alcohol/week: 0.0 standard drinks     Drug use: No     Sexual activity: Yes     Partners: Female   Lifestyle     Physical activity     Days per week: None     Minutes per session: None     Stress: None   Relationships     Social connections     Talks on phone: None     Gets together: None     Attends [employer] service: None     Active member of club or organization: None     Attends meetings of clubs or organizations: None     Relationship status: None     Intimate partner violence     Fear of current or ex partner: None     Emotionally abused: None     Physically abused: None     Forced sexual activity: None   Other Topics Concern      Service No     Blood Transfusions No     Caffeine Concern Not Asked     Occupational Exposure Not Asked     Hobby Hazards Not Asked     Sleep Concern Not Asked     Stress Concern Not Asked     Weight Concern Not Asked     Special Diet Not Asked     Back Care Not Asked     Exercise Yes     Bike Helmet Not Asked     Seat Belt Yes     Self-Exams No     Parent/sibling w/ CABG, MI or angioplasty before 65F 55M? Yes   Social History Narrative    Balanced Diet - Yes    Osteoporosis Preventative measures-  Dairy servings per day: 2 to 3 servings daily    Regular Exercise -  Yes Describe walks 1.5 mile daily     Dental Exam up - YES - Date: 2 years ago    Eye Exam - YES - Date: 1 year ago    Self Testicular Exam -  No, handout given    Do you have any concerns about STD's -  No    Abuse: Current or Past (Physical, Sexual or Emotional)- No    Do you feel safe in your environment - Yes    Guns stored in the home - Yes, locked    Sunscreen used - No    Seatbelts used - Yes    Lipids - YES - Date: 3-09    Glucose -  YES - Date: 3-09    Colon Cancer Screening - Colonoscopy 3-08(date  "completed)    Hemoccults - UNKNOWN    PSA - YES - Date: 11-07    Digital Rectal Exam - UNKNOWN    Immunizations reviewed and up to date - Yes, td 1-2005, had shingles vaccine    5-28-09  JANEY Patel CMA                       Review of Systems:  Constitutional: No fever, chills, or sweats. No weight gain/loss   ENT: No visual disturbance, ear ache, epistaxis, sore throat. Deaf  Allergies/Immunologic: Negative.   Respiratory: No cough, hemoptysia  Cardiovascular: As per HPI  GI: No nausea, vomiting, hematemesis, melena, or hematochezia  : No urinary frequency, dysuria, or hematuria  Integument: Negative  Psychiatric: Negative  Neuro: Negative  Endocrinology: Negative   Musculoskeletal: Negative      Physical Exam:  Vitals: /64   Pulse 56   Ht 1.715 m (5' 7.5\")   Wt 71.7 kg (158 lb)   BMI 24.38 kg/m     In general, the patient is a pleasant male in no apparent distress.    HEENT: NC/AT.  PERRLA.  EOMI.  Sclerae white, not injected.  Nares clear.  Pharynx without erythema or exudate.  Dentition intact.    Neck: No adenopathy.  No thyromegaly. Carotids +4/4 bilaterally without bruits.  No jugular venous distension.   Heart: Irregular rhythm, reg rate. 4/6 systolic murmur, no rub, click, or gallop.   Lungs: CTA.  No ronchi, wheezes, rales.  Decreased LLL breath sounds  Abdomen: Soft, nontender, nondistended. No organomegaly.  No bruits.   Extremities: No clubbing, cyanosis, or edema.  The pulses are +4/4 at the radial, brachial, femoral, popliteal, DP, and PT sites bilaterally.  No bruits are noted.  Neurologic: Alert and oriented to person/place/time, hearing impaired, uses sign language  Skin: No petechiae, purpura or rash.      Laboratory Studies:  LIPID RESULTS:  Lab Results   Component Value Date    CHOL 119 06/01/2020    HDL 48 06/01/2020    LDL 54 06/01/2020    TRIG 87 06/01/2020    CHOLHDLRATIO 3.0 07/07/2015       LIVER ENZYME RESULTS:  Lab Results   Component Value Date    AST 23 07/13/2020    ALT 28 " 07/13/2020       CBC RESULTS:  Lab Results   Component Value Date    WBC 3.6 (L) 07/13/2020    RBC 5.09 07/13/2020    HGB 11.5 (L) 07/13/2020    HCT 36.6 (L) 07/13/2020    MCV 72 (L) 07/13/2020    MCH 22.6 (L) 07/13/2020    MCHC 31.4 (L) 07/13/2020    RDW 18.6 (H) 07/13/2020     07/13/2020       BMP RESULTS:  Lab Results   Component Value Date     07/13/2020    POTASSIUM 4.2 07/13/2020    CHLORIDE 109 07/13/2020    CO2 26 07/13/2020    ANIONGAP 7 07/13/2020    GLC 98 07/13/2020    BUN 26 07/13/2020    CR 1.40 (H) 07/13/2020    GFRESTIMATED 45 (L) 07/13/2020    GFRESTBLACK 52 (L) 07/13/2020    AYALA 9.4 07/13/2020        A1C RESULTS:  No results found for: A1C    INR RESULTS:  Lab Results   Component Value Date    INR 1.21 (H) 07/13/2020    INR 1.25 (H) 06/03/2020       Thank you for allowing me to participate in the care of your patient.    Sincerely,     EVA Ruvalcaba [facility]

## 2020-07-13 NOTE — TELEPHONE ENCOUNTER
TAVR nurse asking if COVID-19 testing results are available; advised pending and to call back later today.

## 2020-07-13 NOTE — LETTER
7/13/2020    Dany Rodriguez MD, MD  [address] [place] [id number]    RE: Shiraz Ingram       Dear Colleague,    I had the pleasure of seeing Shiraz Ingram in the [facility].        STRUCTURAL HEART CARE   pre-TAVR H&P     Primary Cardiologist: Dr. Emir ZAVALA   Shiraz Ingram is a 85 year old deaf male who presents for pre-operative H&P accompanied by his son and Boyd  in preparation for elective TAVR on 7/14/20 at [facility].     He carries a past medical history notable for aortic stenosis, CAD s/p CABG ( LIMA to LAD, vein graft to OM and RCA, atrial fibrillation w/ MAYA thrombus on chronic anticoagulation, hearing loss, chromic anemia, chronic diastolic heart failure, hypertension, asthma, chronic kidney disease stage III, and recent GI bleed requiring transfusion.    On 3/16/2020, he was evaluated by our structural heart team for severe symptomatic aortic stenosis.  His echo showed a valve area of 0.5 cm  with a mean gradient of 52 mmHg and a peak aortic velocity of 4.9 m/s.  He subsequently underwent TAVR work-up consisting of a TAVR CT showing a aortic valve calcium score of 4435.  He was also found to have nonspecific mediastinal and right hilar lymphadenopathy, scattered bilateral pulmonary nodules measuring up to 6 mm, and multiple renal masses concerning for malignancy.  Follow-up ALINE demonstrated a left atrial appendage thrombus.  He was recommended to switch from Xarelto to Coumadin, but due to COVID 19 and patient needing frequent INRs, he was transitioned to Pradaxa as it targets a different step in the coagulation pathway.  Right and left heart catheterization demonstrated severe three-vessel coronary disease, moderate to severe lesion of the right PDA branch of the SVG- right PDA- right PL Y graft, widely patent LIMA to the LAD, SVG to the OM.  Right heart cath showed moderately elevated right heart pressures,  moderately elevated wedge pressure, and low -normal cardiac index.     Unfortunately, over the last few months he has had multiple hospital admissions, with the most recent from July 3- 5th with community-acquired pneumonia.  He was treated aggressively with IV antibiotics followed by a course of Levaquin at the time of discharge.  COVID-19 test was negative.  He did have a mild troponin elevation which was felt to be demand ischemia.    Today he is feeling well on a cardiac standpoint with the exception of exertional shortness of breath.  He denies chest pain, palpitations, PND, orthopnea, presyncope, syncope, and mild lower extremity edema.    Blood pressure is well controlled at 132/64, managed on lisinopril  Upon exam, left lower lobe mildly decreased, heart rhythm irregular, rate controlled, loud systolic murmur, no abdominal distention or elevated neck veins, trace right ankle edema.  Of note, metoprolol has been held due to bradycardia.  He was evaluated by EP during his last admission who did not feel he met criteria for pacemaker at that time.    Labs today showed a sodium of 142, potassium 4.2, BUN 26, creatinine 1.4, GFR 45  Hemoglobin 11.5 and platelet 168.    Activity level unchanged  Eating and sleeping well  Compliant with all medications.      Assessment and Plan     1.  Severe Aortic Stenosis - echo showed a valve area of 0.5 cm  with a mean gradient of 52 mmHg and a peak aortic velocity of 4.9 m/s.  He has been evaluated in our structural heart clinic and undergone TAVR work-up which is now complete.  He is being recommended for elective TAVR scheduled for 7/14/2020. Risks and benefits of transcatheter aortic valve replacement procedure were discussed today including, bleeding at access site, bruising, infection, allergic reaction, kidney damage (including need for dialysis), stroke, heart attack, vascular damage, arrhythmic abnormalities including high degree AV block (requiring Pacemaker implant)  or atrial fibrillation,  emergency open heart surgery, up to and including death.  Patient and son verbally understand the risks and benefits of the procedure and willing to proceed as scheduled.  He will remain n.p.o. after midnight tonight.  Okay to take a.m. medications with a small sip of water.  Pradaxa currently on hold.     History of blood transfusions/reactions: yes/No  History of abnormal bleeding/anti-platelet use: yes  Steroid use in the last year: No  Personal or family history with difficulty with anesthesia: No     Type and screen orders completed. Supplies for scrubbing provided.   Patient has no fever, chills, or urinary symptoms. Patient does not have contrast dye allergy.   Patient is optimized and is acceptable candidate for the proposed procedure.  No further diagnostic evaluation is needed. Pre-TAVR instructions provided in written format.     Medication Recommendations:  Pradaxa on hold.   Metoprolol on hold.     2. CAD - s/p remote hx of CABG. Recent coronary angiogram demonstrated severe three-vessel coronary disease, moderate to severe lesion of the right PDA branch of the SVG- right PDA- right PL Y graft, widely patent LIMA to the LAD, SVG to the OM.  Continue aspirin, and statin.     3. Atrial fibrillation/ MAYA thrombus. - On no rate controlling agents due to bradycardia. DOAC on hold for procedure     4.  Hypertension - Well controlled  5. Hyperlipidemia - On statin   6. CKD - Creatinine 1.4  7. Asthma - Inhaler and nebulizer use.   8. Hearing Impaired       EVA Ruvalcaba, CNP  7/13/2020    Current Medications:  Current Outpatient Medications   Medication Sig Dispense Refill     albuterol (PROAIR HFA) 108 (90 Base) MCG/ACT inhaler INHALE 1 TO 2 PUFFS EVERY 4 TO 6 HOURS AS NEEDED 1 Inhaler 11     aspirin (ASA) 81 MG EC tablet Take 81 mg by mouth daily       budesonide (PULMICORT) 0.5 MG/2ML nebulizer solution Take 2 mLs (0.5 mg) by nebulization 2 times daily 120 mL 0     calcium  carbonate 600 mg-vitamin D 400 units (CALTRATE) 600-400 MG-UNIT per tablet Take 1 tablet by mouth daily       dabigatran ANTICOAGULANT (PRADAXA) 150 MG capsule Take 1 capsule (150 mg) by mouth 2 times daily Store in original 's bottle or blister pack; use within 120 days of opening. 180 capsule 3     ferrous sulfate (IRON) 325 (65 Fe) MG tablet TAKE 1 TABLET(325 MG) BY MOUTH TWICE DAILY 180 tablet 0     ipratropium - albuterol 0.5 mg/2.5 mg/3 mL (DUONEB) 0.5-2.5 (3) MG/3ML nebulization Inhale 1 vial (3 mLs) into the lungs 4 times daily 360 mL 11     lisinopril (ZESTRIL) 20 MG tablet Take 20 mg by mouth daily       metoprolol tartrate (LOPRESSOR) 50 MG tablet Take 1 tablet (50 mg) by mouth daily --- HOLD UNTIL FOLLOW-UP WITH OUTPATIENT PROVIDERS ---       multivitamin, therapeutic (THERA-VIT) TABS tablet Take 1 tablet by mouth every evening        pantoprazole (PROTONIX) 40 MG EC tablet Take 1 tablet (40 mg) by mouth 2 times daily (before meals) 60 tablet 1     simvastatin (ZOCOR) 40 MG tablet TAKE 1 TABLET(40 MG) BY MOUTH AT BEDTIME 90 tablet 1     montelukast (SINGULAIR) 10 MG tablet TAKE 1 TABLET(10 MG) BY MOUTH DAILY 90 tablet 1       Allergies:     Allergies   Allergen Reactions     No Known Drug Allergies        Past Medical History:  Past Medical History:   Diagnosis Date     Allergic rhinitis, cause unspecified      Anemia, unspecified      Aortic stenosis     severe     Benign neoplasm of colon 1999    Ademonatous polyp     CKD (chronic kidney disease) stage 3, GFR 30-59 ml/min (H) 5/12/2011     Coronary atherosclerosis of unspecified type of vessel, native or graft     s/p CABG 2002     Hyperlipidemia LDL goal < 100      Hypertension goal BP (blood pressure) < 140/90      Localized osteoarthrosis not specified whether primary or secondary, lower leg     left knee     Moderate persistent asthma      Persistent atrial fibrillation (H)      Unspecified hearing loss        Past Surgical  History:  Past Surgical History:   Procedure Laterality Date     C NONSPECIFIC PROCEDURE      Coronary artery bypass     CARDIAC SURGERY       COLONOSCOPY N/A 2020    Procedure: COLONOSCOPY;  Surgeon: Ignacio Fournier MD;  Location:  GI     CV ANGIOGRAM CORONARY GRAFT N/A 2020    Procedure: Angiogram Coronary Graft;  Surgeon: Addison Willard MD;  Location:  HEART CARDIAC CATH LAB     CV CORONARY ANGIOGRAM N/A 2020    Procedure: Coronary Angiogram;  Surgeon: Addison Willard MD;  Location:  HEART CARDIAC CATH LAB     CV RIGHT HEART CATH N/A 2020    Procedure: Right Heart Cath;  Surgeon: Addison Willard MD;  Location:  HEART CARDIAC CATH LAB     HC COLONOSCOPY THRU STOMA W BIOPSY/CAUTERY TUMOR/POLYP/LESION      Dr. Dow, Q 5 years     HC COLONOSCOPY THRU STOMA W BIOPSY/CAUTERY TUMOR/POLYP/LESION      Dr. Dow, one adenomatous polyp     HC ESOPHAGOSCOPY, DIAGNOSTIC      Dr. Dow, lower esophageal ring & mild gastritis     HERNIORRHAPHY INGUINAL Right 2016    Procedure: HERNIORRHAPHY INGUINAL;  Surgeon: Alexis Alexandra MD;  Location: [facility]     LAPAROSCOPIC CHOLECYSTECTOMY      for biliary sludge       Family History:  Family History   Problem Relation Age of Onset     C.A.D. Father      Cancer Mother         breast cancer,  of pneumonia     Cerebrovascular Disease Son      CABG Son      Family History Negative Child      Family History Negative Sister      Heart Disease Brother        Social History:  Social History     Socioeconomic History     Marital status:      Spouse name: Kailey     Number of children: 3     Years of education: None     Highest education level: None   Occupational History     Occupation: [employer]     Employer: RETIRED   Social Needs     Financial resource strain: None     Food insecurity     Worry: None     Inability: None     Transportation needs     Medical: None     Non-medical: None    Tobacco Use     Smoking status: Former Smoker     Types: Cigarettes     Smokeless tobacco: Never Used     Tobacco comment: smoked for a week in 20's   Substance and Sexual Activity     Alcohol use: No     Alcohol/week: 0.0 standard drinks     Drug use: No     Sexual activity: Yes     Partners: Female   Lifestyle     Physical activity     Days per week: None     Minutes per session: None     Stress: None   Relationships     Social connections     Talks on phone: None     Gets together: None     Attends Congregation service: None     Active member of club or organization: None     Attends meetings of clubs or organizations: None     Relationship status: None     Intimate partner violence     Fear of current or ex partner: None     Emotionally abused: None     Physically abused: None     Forced sexual activity: None   Other Topics Concern      Service No     Blood Transfusions No     Caffeine Concern Not Asked     Occupational Exposure Not Asked     Hobby Hazards Not Asked     Sleep Concern Not Asked     Stress Concern Not Asked     Weight Concern Not Asked     Special Diet Not Asked     Back Care Not Asked     Exercise Yes     Bike Helmet Not Asked     Seat Belt Yes     Self-Exams No     Parent/sibling w/ CABG, MI or angioplasty before 65F 55M? Yes   Social History Narrative    Balanced Diet - Yes    Osteoporosis Preventative measures-  Dairy servings per day: 2 to 3 servings daily    Regular Exercise -  Yes Describe walks 1.5 mile daily     Dental Exam up - YES - Date: 2 years ago    Eye Exam - YES - Date: 1 year ago    Self Testicular Exam -  No, handout given    Do you have any concerns about STD's -  No    Abuse: Current or Past (Physical, Sexual or Emotional)- No    Do you feel safe in your environment - Yes    Guns stored in the home - Yes, locked    Sunscreen used - No    Seatbelts used - Yes    Lipids - YES - Date: 3-09    Glucose -  YES - Date: 3-09    Colon Cancer Screening - Colonoscopy 3-08(date  "completed)    Hemoccults - UNKNOWN    PSA - YES - Date: 11-07    Digital Rectal Exam - UNKNOWN    Immunizations reviewed and up to date - Yes, td 1-2005, had shingles vaccine    5-28-09  JANEY Patel CMA                       Review of Systems:  Constitutional: No fever, chills, or sweats. No weight gain/loss   ENT: No visual disturbance, ear ache, epistaxis, sore throat. Deaf  Allergies/Immunologic: Negative.   Respiratory: No cough, hemoptysia  Cardiovascular: As per HPI  GI: No nausea, vomiting, hematemesis, melena, or hematochezia  : No urinary frequency, dysuria, or hematuria  Integument: Negative  Psychiatric: Negative  Neuro: Negative  Endocrinology: Negative   Musculoskeletal: Negative      Physical Exam:  Vitals: /64   Pulse 56   Ht 1.715 m (5' 7.5\")   Wt 71.7 kg (158 lb)   BMI 24.38 kg/m     In general, the patient is a pleasant male in no apparent distress.    HEENT: NC/AT.  PERRLA.  EOMI.  Sclerae white, not injected.  Nares clear.  Pharynx without erythema or exudate.  Dentition intact.    Neck: No adenopathy.  No thyromegaly. Carotids +4/4 bilaterally without bruits.  No jugular venous distension.   Heart: Irregular rhythm, reg rate. 4/6 systolic murmur, no rub, click, or gallop.   Lungs: CTA.  No ronchi, wheezes, rales.  Decreased LLL breath sounds  Abdomen: Soft, nontender, nondistended. No organomegaly.  No bruits.   Extremities: No clubbing, cyanosis, or edema.  The pulses are +4/4 at the radial, brachial, femoral, popliteal, DP, and PT sites bilaterally.  No bruits are noted.  Neurologic: Alert and oriented to person/place/time, hearing impaired, uses sign language  Skin: No petechiae, purpura or rash.      Laboratory Studies:  LIPID RESULTS:  Lab Results   Component Value Date    CHOL 119 06/01/2020    HDL 48 06/01/2020    LDL 54 06/01/2020    TRIG 87 06/01/2020    CHOLHDLRATIO 3.0 07/07/2015       LIVER ENZYME RESULTS:  Lab Results   Component Value Date    AST 23 07/13/2020    ALT 28 " 07/13/2020       CBC RESULTS:  Lab Results   Component Value Date    WBC 3.6 (L) 07/13/2020    RBC 5.09 07/13/2020    HGB 11.5 (L) 07/13/2020    HCT 36.6 (L) 07/13/2020    MCV 72 (L) 07/13/2020    MCH 22.6 (L) 07/13/2020    MCHC 31.4 (L) 07/13/2020    RDW 18.6 (H) 07/13/2020     07/13/2020       BMP RESULTS:  Lab Results   Component Value Date     07/13/2020    POTASSIUM 4.2 07/13/2020    CHLORIDE 109 07/13/2020    CO2 26 07/13/2020    ANIONGAP 7 07/13/2020    GLC 98 07/13/2020    BUN 26 07/13/2020    CR 1.40 (H) 07/13/2020    GFRESTIMATED 45 (L) 07/13/2020    GFRESTBLACK 52 (L) 07/13/2020    AYALA 9.4 07/13/2020        A1C RESULTS:  No results found for: A1C    INR RESULTS:  Lab Results   Component Value Date    INR 1.21 (H) 07/13/2020    INR 1.25 (H) 06/03/2020           Thank you for allowing me to participate in the care of your patient.      Sincerely,     EVA Ruvalcaba CNP     [facility]    cc:   Dany Rodriguez MD  [address] [place] [address]

## 2020-07-13 NOTE — PROGRESS NOTES
Patient here for pre op TAVR.  Went over instructions regarding hibiclens shower tonight and in the AM.  Instructed to come to [facility] at 10:30 AM with lizzy roberto.  Patient has not been taking Lisinopril or Metoprolol which both of these medications are listed in his med list.    I informed him we would reevaluate at discharge.  He has not been taking Pradaxa x 3 days.  Only on ASA 81 mg daily.  All questions answered.

## 2020-07-14 ENCOUNTER — HOSPITAL ENCOUNTER (OUTPATIENT)
Dept: CARDIOLOGY | Facility: CLINIC | Age: 85
Discharge: HOME | DRG: 267 | End: 2020-07-14
Attending: INTERNAL MEDICINE | Admitting: INTERNAL MEDICINE
Payer: COMMERCIAL

## 2020-07-14 ENCOUNTER — HOSPITAL ENCOUNTER (INPATIENT)
Facility: CLINIC | Age: 85
LOS: 2 days | Discharge: HOME | DRG: 267 | End: 2020-07-16
Admitting: THORACIC SURGERY (CARDIOTHORACIC VASCULAR SURGERY)
Payer: COMMERCIAL

## 2020-07-14 ENCOUNTER — ANESTHESIA EVENT (OUTPATIENT)
Dept: CARDIOLOGY | Facility: CLINIC | Age: 85
DRG: 267 | End: 2020-07-14
Payer: COMMERCIAL

## 2020-07-14 ENCOUNTER — SURGERY (OUTPATIENT)
Age: 85
End: 2020-07-14
Payer: COMMERCIAL

## 2020-07-14 ENCOUNTER — ANESTHESIA (OUTPATIENT)
Dept: CARDIOLOGY | Facility: CLINIC | Age: 85
DRG: 267 | End: 2020-07-14
Payer: COMMERCIAL

## 2020-07-14 DIAGNOSIS — I35.0 SEVERE AORTIC STENOSIS: ICD-10-CM

## 2020-07-14 DIAGNOSIS — I51.3 THROMBUS OF LEFT ATRIAL APPENDAGE: ICD-10-CM

## 2020-07-14 DIAGNOSIS — I48.21 PERMANENT ATRIAL FIBRILLATION (H): ICD-10-CM

## 2020-07-14 DIAGNOSIS — I44.2 COMPLETE HEART BLOCK (H): Primary | ICD-10-CM

## 2020-07-14 LAB
ABO + RH BLD: NORMAL
ABO + RH BLD: NORMAL
ALBUMIN SERPL-MCNC: 3 G/DL (ref 3.4–5)
ALP SERPL-CCNC: 60 U/L (ref 40–150)
ALT SERPL W P-5'-P-CCNC: 21 U/L (ref 0–70)
ANION GAP SERPL CALCULATED.3IONS-SCNC: 4 MMOL/L (ref 3–14)
AST SERPL W P-5'-P-CCNC: 20 U/L (ref 0–45)
BILIRUB SERPL-MCNC: 0.5 MG/DL (ref 0.2–1.3)
BLD GP AB SCN SERPL QL: NORMAL
BLD PROD TYP BPU: NORMAL
BLD UNIT ID BPU: 0
BLD UNIT ID BPU: 0
BLOOD BANK CMNT PATIENT-IMP: NORMAL
BLOOD PRODUCT CODE: NORMAL
BLOOD PRODUCT CODE: NORMAL
BPU ID: NORMAL
BPU ID: NORMAL
BUN SERPL-MCNC: 22 MG/DL (ref 7–30)
CALCIUM SERPL-MCNC: 8.4 MG/DL (ref 8.5–10.1)
CHLORIDE SERPL-SCNC: 110 MMOL/L (ref 94–109)
CO2 SERPL-SCNC: 27 MMOL/L (ref 20–32)
CREAT SERPL-MCNC: 1.36 MG/DL (ref 0.66–1.25)
ERYTHROCYTE [DISTWIDTH] IN BLOOD BY AUTOMATED COUNT: 18.7 % (ref 10–15)
GFR SERPL CREATININE-BSD FRML MDRD: 47 ML/MIN/{1.73_M2}
GLUCOSE SERPL-MCNC: 93 MG/DL (ref 70–99)
HCT VFR BLD AUTO: 30.8 % (ref 40–53)
HGB BLD-MCNC: 9.7 G/DL (ref 13.3–17.7)
KCT BLD-ACNC: 124 SEC (ref 75–150)
KCT BLD-ACNC: 141 SEC (ref 75–150)
KCT BLD-ACNC: 255 SEC (ref 75–150)
KCT BLD-ACNC: 293 SEC (ref 75–150)
MAGNESIUM SERPL-MCNC: 1.8 MG/DL (ref 1.6–2.3)
MCH RBC QN AUTO: 22.8 PG (ref 26.5–33)
MCHC RBC AUTO-ENTMCNC: 31.5 G/DL (ref 31.5–36.5)
MCV RBC AUTO: 72 FL (ref 78–100)
NUM BPU REQUESTED: 2
PHOSPHATE SERPL-MCNC: 3.6 MG/DL (ref 2.5–4.5)
PLATELET # BLD AUTO: 131 10E9/L (ref 150–450)
POTASSIUM SERPL-SCNC: 4.1 MMOL/L (ref 3.4–5.3)
POTASSIUM SERPL-SCNC: 4.4 MMOL/L (ref 3.4–5.3)
PROT SERPL-MCNC: 6.2 G/DL (ref 6.8–8.8)
RBC # BLD AUTO: 4.26 10E12/L (ref 4.4–5.9)
SODIUM SERPL-SCNC: 141 MMOL/L (ref 133–144)
SPECIMEN EXP DATE BLD: NORMAL
TRANSFUSION STATUS PATIENT QL: NORMAL
WBC # BLD AUTO: 3.4 10E9/L (ref 4–11)

## 2020-07-14 PROCEDURE — 25000125 ZZHC RX 250: Performed by: ANESTHESIOLOGY

## 2020-07-14 PROCEDURE — 25000128 H RX IP 250 OP 636

## 2020-07-14 PROCEDURE — 25000128 H RX IP 250 OP 636: Performed by: PHYSICIAN ASSISTANT

## 2020-07-14 PROCEDURE — C9113 INJ PANTOPRAZOLE SODIUM, VIA: HCPCS | Performed by: PHYSICIAN ASSISTANT

## 2020-07-14 PROCEDURE — 25000132 ZZH RX MED GY IP 250 OP 250 PS 637: Performed by: HOSPITALIST

## 2020-07-14 PROCEDURE — 33361 REPLACE AORTIC VALVE PERQ: CPT | Mod: 62 | Performed by: INTERNAL MEDICINE

## 2020-07-14 PROCEDURE — 94640 AIRWAY INHALATION TREATMENT: CPT

## 2020-07-14 PROCEDURE — 25800030 ZZH RX IP 258 OP 636: Performed by: INTERNAL MEDICINE

## 2020-07-14 PROCEDURE — 85027 COMPLETE CBC AUTOMATED: CPT | Performed by: STUDENT IN AN ORGANIZED HEALTH CARE EDUCATION/TRAINING PROGRAM

## 2020-07-14 PROCEDURE — 40000275 ZZH STATISTIC RCP TIME EA 10 MIN

## 2020-07-14 PROCEDURE — 80053 COMPREHEN METABOLIC PANEL: CPT | Performed by: STUDENT IN AN ORGANIZED HEALTH CARE EDUCATION/TRAINING PROGRAM

## 2020-07-14 PROCEDURE — 25800030 ZZH RX IP 258 OP 636: Performed by: NURSE ANESTHETIST, CERTIFIED REGISTERED

## 2020-07-14 PROCEDURE — 37000009 ZZH ANESTHESIA TECHNICAL FEE, EACH ADDTL 15 MIN: Performed by: INTERNAL MEDICINE

## 2020-07-14 PROCEDURE — 93355 ECHO TRANSESOPHAGEAL (TEE): CPT | Performed by: INTERNAL MEDICINE

## 2020-07-14 PROCEDURE — 25000128 H RX IP 250 OP 636: Performed by: INTERNAL MEDICINE

## 2020-07-14 PROCEDURE — 27211358 ZZ H CATHETER, DELIVERY (TAVR): Performed by: INTERNAL MEDICINE

## 2020-07-14 PROCEDURE — 27210794 ZZH OR GENERAL SUPPLY STERILE: Performed by: INTERNAL MEDICINE

## 2020-07-14 PROCEDURE — C1725 CATH, TRANSLUMIN NON-LASER: HCPCS | Performed by: INTERNAL MEDICINE

## 2020-07-14 PROCEDURE — C1760 CLOSURE DEV, VASC: HCPCS | Performed by: INTERNAL MEDICINE

## 2020-07-14 PROCEDURE — 93355 ECHO TRANSESOPHAGEAL (TEE): CPT

## 2020-07-14 PROCEDURE — 99223 1ST HOSP IP/OBS HIGH 75: CPT | Mod: AI | Performed by: PHYSICIAN ASSISTANT

## 2020-07-14 PROCEDURE — 93005 ELECTROCARDIOGRAM TRACING: CPT

## 2020-07-14 PROCEDURE — 25000125 ZZHC RX 250: Performed by: PHYSICIAN ASSISTANT

## 2020-07-14 PROCEDURE — 25000128 H RX IP 250 OP 636: Performed by: HOSPITALIST

## 2020-07-14 PROCEDURE — C1769 GUIDE WIRE: HCPCS | Performed by: INTERNAL MEDICINE

## 2020-07-14 PROCEDURE — 33361 REPLACE AORTIC VALVE PERQ: CPT | Performed by: INTERNAL MEDICINE

## 2020-07-14 PROCEDURE — C1730 CATH, EP, 19 OR FEW ELECT: HCPCS | Performed by: INTERNAL MEDICINE

## 2020-07-14 PROCEDURE — 83735 ASSAY OF MAGNESIUM: CPT | Performed by: STUDENT IN AN ORGANIZED HEALTH CARE EDUCATION/TRAINING PROGRAM

## 2020-07-14 PROCEDURE — 84100 ASSAY OF PHOSPHORUS: CPT | Performed by: STUDENT IN AN ORGANIZED HEALTH CARE EDUCATION/TRAINING PROGRAM

## 2020-07-14 PROCEDURE — C1894 INTRO/SHEATH, NON-LASER: HCPCS | Performed by: INTERNAL MEDICINE

## 2020-07-14 PROCEDURE — 93010 ELECTROCARDIOGRAM REPORT: CPT | Performed by: INTERNAL MEDICINE

## 2020-07-14 PROCEDURE — 21000001 ZZH R&B HEART CARE

## 2020-07-14 PROCEDURE — 25000128 H RX IP 250 OP 636: Performed by: NURSE ANESTHETIST, CERTIFIED REGISTERED

## 2020-07-14 PROCEDURE — C1898 LEAD, PMKR, OTHER THAN TRANS: HCPCS | Performed by: INTERNAL MEDICINE

## 2020-07-14 PROCEDURE — 25000128 H RX IP 250 OP 636: Performed by: STUDENT IN AN ORGANIZED HEALTH CARE EDUCATION/TRAINING PROGRAM

## 2020-07-14 PROCEDURE — 27210749 ZZH CATH CR2: Performed by: INTERNAL MEDICINE

## 2020-07-14 PROCEDURE — 36415 COLL VENOUS BLD VENIPUNCTURE: CPT | Performed by: ANESTHESIOLOGY

## 2020-07-14 PROCEDURE — 21000000 ZZH R&B IMCU HEART CARE

## 2020-07-14 PROCEDURE — 37000008 ZZH ANESTHESIA TECHNICAL FEE, 1ST 30 MIN: Performed by: INTERNAL MEDICINE

## 2020-07-14 PROCEDURE — 25000566 ZZH SEVOFLURANE, EA 15 MIN: Performed by: INTERNAL MEDICINE

## 2020-07-14 PROCEDURE — 27211359 ZZ H DEVICE, LOADING (TAVR): Performed by: INTERNAL MEDICINE

## 2020-07-14 PROCEDURE — 27211357 ZZ H VALVE (TAVR): Performed by: INTERNAL MEDICINE

## 2020-07-14 PROCEDURE — 84132 ASSAY OF SERUM POTASSIUM: CPT | Performed by: ANESTHESIOLOGY

## 2020-07-14 PROCEDURE — 02RF38Z REPLACEMENT OF AORTIC VALVE WITH ZOOPLASTIC TISSUE, PERCUTANEOUS APPROACH: ICD-10-PCS | Performed by: INTERNAL MEDICINE

## 2020-07-14 PROCEDURE — 25000125 ZZHC RX 250: Performed by: NURSE ANESTHETIST, CERTIFIED REGISTERED

## 2020-07-14 PROCEDURE — 94640 AIRWAY INHALATION TREATMENT: CPT | Mod: 76

## 2020-07-14 PROCEDURE — 25000128 H RX IP 250 OP 636: Performed by: ANESTHESIOLOGY

## 2020-07-14 PROCEDURE — 85347 COAGULATION TIME ACTIVATED: CPT

## 2020-07-14 PROCEDURE — 25000125 ZZHC RX 250: Performed by: INTERNAL MEDICINE

## 2020-07-14 DEVICE — IMPLANTABLE DEVICE: Type: IMPLANTABLE DEVICE | Status: NON-FUNCTIONAL

## 2020-07-14 RX ORDER — PANTOPRAZOLE SODIUM 40 MG/1
40 TABLET, DELAYED RELEASE ORAL
Status: DISCONTINUED | OUTPATIENT
Start: 2020-07-15 | End: 2020-07-14

## 2020-07-14 RX ORDER — SODIUM CHLORIDE, SODIUM LACTATE, POTASSIUM CHLORIDE, CALCIUM CHLORIDE 600; 310; 30; 20 MG/100ML; MG/100ML; MG/100ML; MG/100ML
INJECTION, SOLUTION INTRAVENOUS CONTINUOUS
Status: DISCONTINUED | OUTPATIENT
Start: 2020-07-14 | End: 2020-07-14

## 2020-07-14 RX ORDER — SODIUM CHLORIDE 9 MG/ML
INJECTION, SOLUTION INTRAVENOUS CONTINUOUS
Status: DISCONTINUED | OUTPATIENT
Start: 2020-07-14 | End: 2020-07-14

## 2020-07-14 RX ORDER — ONDANSETRON 4 MG/1
4 TABLET, ORALLY DISINTEGRATING ORAL EVERY 30 MIN PRN
Status: DISCONTINUED | OUTPATIENT
Start: 2020-07-14 | End: 2020-07-14

## 2020-07-14 RX ORDER — MECLIZINE HCL 12.5 MG 12.5 MG/1
12.5 TABLET ORAL 3 TIMES DAILY PRN
COMMUNITY
End: 2020-08-05

## 2020-07-14 RX ORDER — HYDROCODONE BITARTRATE AND ACETAMINOPHEN 5; 325 MG/1; MG/1
1 TABLET ORAL EVERY 6 HOURS PRN
Status: DISCONTINUED | OUTPATIENT
Start: 2020-07-14 | End: 2020-07-15

## 2020-07-14 RX ORDER — MEPERIDINE HYDROCHLORIDE 25 MG/ML
12.5 INJECTION INTRAMUSCULAR; INTRAVENOUS; SUBCUTANEOUS EVERY 5 MIN PRN
Status: DISCONTINUED | OUTPATIENT
Start: 2020-07-14 | End: 2020-07-14

## 2020-07-14 RX ORDER — ALBUTEROL SULFATE 90 UG/1
2 AEROSOL, METERED RESPIRATORY (INHALATION) 4 TIMES DAILY PRN
Status: DISCONTINUED | OUTPATIENT
Start: 2020-07-14 | End: 2020-07-16 | Stop reason: HOSPADM

## 2020-07-14 RX ORDER — IOPAMIDOL 755 MG/ML
INJECTION, SOLUTION INTRAVASCULAR
Status: DISCONTINUED | OUTPATIENT
Start: 2020-07-14 | End: 2020-07-14 | Stop reason: HOSPADM

## 2020-07-14 RX ORDER — ONDANSETRON 2 MG/ML
4 INJECTION INTRAMUSCULAR; INTRAVENOUS EVERY 30 MIN PRN
Status: DISCONTINUED | OUTPATIENT
Start: 2020-07-14 | End: 2020-07-14

## 2020-07-14 RX ORDER — FENTANYL CITRATE 50 UG/ML
50-100 INJECTION, SOLUTION INTRAMUSCULAR; INTRAVENOUS EVERY 5 MIN PRN
Status: DISCONTINUED | OUTPATIENT
Start: 2020-07-14 | End: 2020-07-14

## 2020-07-14 RX ORDER — BUDESONIDE 0.5 MG/2ML
0.5 INHALANT ORAL 2 TIMES DAILY
Status: DISCONTINUED | OUTPATIENT
Start: 2020-07-14 | End: 2020-07-16 | Stop reason: HOSPADM

## 2020-07-14 RX ORDER — ASPIRIN 81 MG/1
81 TABLET ORAL DAILY
Status: DISCONTINUED | OUTPATIENT
Start: 2020-07-14 | End: 2020-07-14 | Stop reason: HOSPADM

## 2020-07-14 RX ORDER — HEPARIN SODIUM 1000 [USP'U]/ML
INJECTION, SOLUTION INTRAVENOUS; SUBCUTANEOUS PRN
Status: DISCONTINUED | OUTPATIENT
Start: 2020-07-14 | End: 2020-07-14

## 2020-07-14 RX ORDER — LIDOCAINE 40 MG/G
CREAM TOPICAL
Status: DISCONTINUED | OUTPATIENT
Start: 2020-07-14 | End: 2020-07-14 | Stop reason: HOSPADM

## 2020-07-14 RX ORDER — PROPOFOL 10 MG/ML
INJECTION, EMULSION INTRAVENOUS PRN
Status: DISCONTINUED | OUTPATIENT
Start: 2020-07-14 | End: 2020-07-14

## 2020-07-14 RX ORDER — POLYETHYLENE GLYCOL 3350 17 G/17G
17 POWDER, FOR SOLUTION ORAL DAILY PRN
Status: DISCONTINUED | OUTPATIENT
Start: 2020-07-14 | End: 2020-07-16 | Stop reason: HOSPADM

## 2020-07-14 RX ORDER — NALOXONE HYDROCHLORIDE 0.4 MG/ML
.1-.4 INJECTION, SOLUTION INTRAMUSCULAR; INTRAVENOUS; SUBCUTANEOUS
Status: DISCONTINUED | OUTPATIENT
Start: 2020-07-14 | End: 2020-07-14

## 2020-07-14 RX ORDER — NALOXONE HYDROCHLORIDE 0.4 MG/ML
.1-.4 INJECTION, SOLUTION INTRAMUSCULAR; INTRAVENOUS; SUBCUTANEOUS
Status: DISCONTINUED | OUTPATIENT
Start: 2020-07-14 | End: 2020-07-15

## 2020-07-14 RX ORDER — LABETALOL 20 MG/4 ML (5 MG/ML) INTRAVENOUS SYRINGE
10
Status: DISCONTINUED | OUTPATIENT
Start: 2020-07-14 | End: 2020-07-14

## 2020-07-14 RX ORDER — ONDANSETRON 2 MG/ML
4 INJECTION INTRAMUSCULAR; INTRAVENOUS EVERY 6 HOURS PRN
Status: DISCONTINUED | OUTPATIENT
Start: 2020-07-14 | End: 2020-07-16 | Stop reason: HOSPADM

## 2020-07-14 RX ORDER — GLYCOPYRROLATE 0.2 MG/ML
INJECTION, SOLUTION INTRAMUSCULAR; INTRAVENOUS PRN
Status: DISCONTINUED | OUTPATIENT
Start: 2020-07-14 | End: 2020-07-14

## 2020-07-14 RX ORDER — NEOSTIGMINE METHYLSULFATE 1 MG/ML
VIAL (ML) INJECTION PRN
Status: DISCONTINUED | OUTPATIENT
Start: 2020-07-14 | End: 2020-07-14

## 2020-07-14 RX ORDER — ONDANSETRON 2 MG/ML
INJECTION INTRAMUSCULAR; INTRAVENOUS PRN
Status: DISCONTINUED | OUTPATIENT
Start: 2020-07-14 | End: 2020-07-14

## 2020-07-14 RX ORDER — ACETAMINOPHEN 650 MG/1
650 SUPPOSITORY RECTAL EVERY 4 HOURS PRN
Status: DISCONTINUED | OUTPATIENT
Start: 2020-07-14 | End: 2020-07-16 | Stop reason: HOSPADM

## 2020-07-14 RX ORDER — FERROUS SULFATE 325(65) MG
325 TABLET ORAL DAILY
Status: DISCONTINUED | OUTPATIENT
Start: 2020-07-14 | End: 2020-07-16 | Stop reason: HOSPADM

## 2020-07-14 RX ORDER — SODIUM CHLORIDE 9 MG/ML
INJECTION, SOLUTION INTRAVENOUS CONTINUOUS
Status: DISCONTINUED | OUTPATIENT
Start: 2020-07-14 | End: 2020-07-14 | Stop reason: HOSPADM

## 2020-07-14 RX ORDER — LISINOPRIL 20 MG/1
20 TABLET ORAL DAILY
Status: DISCONTINUED | OUTPATIENT
Start: 2020-07-14 | End: 2020-07-14

## 2020-07-14 RX ORDER — ACETAMINOPHEN 325 MG/1
325-650 TABLET ORAL EVERY 4 HOURS PRN
Status: DISCONTINUED | OUTPATIENT
Start: 2020-07-14 | End: 2020-07-14

## 2020-07-14 RX ORDER — SIMVASTATIN 40 MG
40 TABLET ORAL AT BEDTIME
Status: DISCONTINUED | OUTPATIENT
Start: 2020-07-14 | End: 2020-07-16 | Stop reason: HOSPADM

## 2020-07-14 RX ORDER — BUDESONIDE 0.5 MG/2ML
0.5 INHALANT ORAL 2 TIMES DAILY
Status: DISCONTINUED | OUTPATIENT
Start: 2020-07-14 | End: 2020-07-14

## 2020-07-14 RX ORDER — HYDRALAZINE HYDROCHLORIDE 20 MG/ML
10 INJECTION INTRAMUSCULAR; INTRAVENOUS
Status: DISCONTINUED | OUTPATIENT
Start: 2020-07-14 | End: 2020-07-16 | Stop reason: HOSPADM

## 2020-07-14 RX ORDER — PROTAMINE SULFATE 10 MG/ML
INJECTION, SOLUTION INTRAVENOUS PRN
Status: DISCONTINUED | OUTPATIENT
Start: 2020-07-14 | End: 2020-07-14

## 2020-07-14 RX ORDER — HYDROMORPHONE HYDROCHLORIDE 1 MG/ML
.3-.5 INJECTION, SOLUTION INTRAMUSCULAR; INTRAVENOUS; SUBCUTANEOUS EVERY 5 MIN PRN
Status: DISCONTINUED | OUTPATIENT
Start: 2020-07-14 | End: 2020-07-14

## 2020-07-14 RX ORDER — MONTELUKAST SODIUM 10 MG/1
10 TABLET ORAL DAILY
Status: DISCONTINUED | OUTPATIENT
Start: 2020-07-15 | End: 2020-07-16 | Stop reason: HOSPADM

## 2020-07-14 RX ORDER — LIDOCAINE 40 MG/G
CREAM TOPICAL
Status: DISCONTINUED | OUTPATIENT
Start: 2020-07-14 | End: 2020-07-16 | Stop reason: HOSPADM

## 2020-07-14 RX ORDER — MULTIVITAMIN,THERAPEUTIC
1 TABLET ORAL EVERY EVENING
Status: DISCONTINUED | OUTPATIENT
Start: 2020-07-14 | End: 2020-07-16 | Stop reason: HOSPADM

## 2020-07-14 RX ORDER — SODIUM CHLORIDE, SODIUM LACTATE, POTASSIUM CHLORIDE, CALCIUM CHLORIDE 600; 310; 30; 20 MG/100ML; MG/100ML; MG/100ML; MG/100ML
INJECTION, SOLUTION INTRAVENOUS CONTINUOUS PRN
Status: DISCONTINUED | OUTPATIENT
Start: 2020-07-14 | End: 2020-07-14

## 2020-07-14 RX ORDER — FENTANYL CITRATE 50 UG/ML
25-50 INJECTION, SOLUTION INTRAMUSCULAR; INTRAVENOUS
Status: DISCONTINUED | OUTPATIENT
Start: 2020-07-14 | End: 2020-07-14

## 2020-07-14 RX ORDER — AMOXICILLIN 250 MG
1 CAPSULE ORAL 2 TIMES DAILY PRN
Status: DISCONTINUED | OUTPATIENT
Start: 2020-07-14 | End: 2020-07-16 | Stop reason: HOSPADM

## 2020-07-14 RX ORDER — LIDOCAINE HYDROCHLORIDE 20 MG/ML
INJECTION, SOLUTION INFILTRATION; PERINEURAL PRN
Status: DISCONTINUED | OUTPATIENT
Start: 2020-07-14 | End: 2020-07-14

## 2020-07-14 RX ORDER — ACETAMINOPHEN 325 MG/1
650 TABLET ORAL EVERY 4 HOURS PRN
Status: DISCONTINUED | OUTPATIENT
Start: 2020-07-14 | End: 2020-07-16 | Stop reason: HOSPADM

## 2020-07-14 RX ORDER — HYDRALAZINE HYDROCHLORIDE 20 MG/ML
2.5-5 INJECTION INTRAMUSCULAR; INTRAVENOUS EVERY 10 MIN PRN
Status: DISCONTINUED | OUTPATIENT
Start: 2020-07-14 | End: 2020-07-14

## 2020-07-14 RX ORDER — ALBUTEROL SULFATE 90 UG/1
2 AEROSOL, METERED RESPIRATORY (INHALATION) 4 TIMES DAILY PRN
Status: ON HOLD | COMMUNITY
End: 2020-10-30

## 2020-07-14 RX ORDER — HYDROMORPHONE HYDROCHLORIDE 1 MG/ML
0.2 INJECTION, SOLUTION INTRAMUSCULAR; INTRAVENOUS; SUBCUTANEOUS EVERY 4 HOURS PRN
Status: DISCONTINUED | OUTPATIENT
Start: 2020-07-14 | End: 2020-07-16 | Stop reason: HOSPADM

## 2020-07-14 RX ORDER — IPRATROPIUM BROMIDE AND ALBUTEROL SULFATE 2.5; .5 MG/3ML; MG/3ML
3 SOLUTION RESPIRATORY (INHALATION)
Status: DISCONTINUED | OUTPATIENT
Start: 2020-07-14 | End: 2020-07-16 | Stop reason: HOSPADM

## 2020-07-14 RX ORDER — FENTANYL CITRATE 50 UG/ML
INJECTION, SOLUTION INTRAMUSCULAR; INTRAVENOUS PRN
Status: DISCONTINUED | OUTPATIENT
Start: 2020-07-14 | End: 2020-07-14

## 2020-07-14 RX ORDER — METOPROLOL TARTRATE 50 MG
50 TABLET ORAL DAILY
Status: DISCONTINUED | OUTPATIENT
Start: 2020-07-14 | End: 2020-07-14

## 2020-07-14 RX ORDER — HEPARIN SODIUM 10000 [USP'U]/100ML
850 INJECTION, SOLUTION INTRAVENOUS CONTINUOUS
Status: DISCONTINUED | OUTPATIENT
Start: 2020-07-14 | End: 2020-07-15

## 2020-07-14 RX ORDER — CEFAZOLIN SODIUM 2 G/100ML
2 INJECTION, SOLUTION INTRAVENOUS
Status: COMPLETED | OUTPATIENT
Start: 2020-07-14 | End: 2020-07-14

## 2020-07-14 RX ORDER — NITROGLYCERIN 0.4 MG/1
0.4 TABLET SUBLINGUAL EVERY 5 MIN PRN
Status: DISCONTINUED | OUTPATIENT
Start: 2020-07-14 | End: 2020-07-16 | Stop reason: HOSPADM

## 2020-07-14 RX ORDER — ALBUTEROL SULFATE 0.83 MG/ML
2.5 SOLUTION RESPIRATORY (INHALATION) EVERY 4 HOURS PRN
Status: DISCONTINUED | OUTPATIENT
Start: 2020-07-14 | End: 2020-07-14

## 2020-07-14 RX ORDER — AMOXICILLIN 250 MG
2 CAPSULE ORAL 2 TIMES DAILY PRN
Status: DISCONTINUED | OUTPATIENT
Start: 2020-07-14 | End: 2020-07-16 | Stop reason: HOSPADM

## 2020-07-14 RX ORDER — ONDANSETRON 4 MG/1
4 TABLET, ORALLY DISINTEGRATING ORAL EVERY 6 HOURS PRN
Status: DISCONTINUED | OUTPATIENT
Start: 2020-07-14 | End: 2020-07-16 | Stop reason: HOSPADM

## 2020-07-14 RX ORDER — BISACODYL 10 MG
10 SUPPOSITORY, RECTAL RECTAL DAILY PRN
Status: DISCONTINUED | OUTPATIENT
Start: 2020-07-14 | End: 2020-07-16 | Stop reason: HOSPADM

## 2020-07-14 RX ADMIN — SODIUM CHLORIDE: 9 INJECTION, SOLUTION INTRAVENOUS at 12:39

## 2020-07-14 RX ADMIN — IPRATROPIUM BROMIDE AND ALBUTEROL SULFATE 3 ML: .5; 3 SOLUTION RESPIRATORY (INHALATION) at 22:17

## 2020-07-14 RX ADMIN — CEFAZOLIN SODIUM 2 G: 2 INJECTION, SOLUTION INTRAVENOUS at 14:13

## 2020-07-14 RX ADMIN — SODIUM CHLORIDE, POTASSIUM CHLORIDE, SODIUM LACTATE AND CALCIUM CHLORIDE: 600; 310; 30; 20 INJECTION, SOLUTION INTRAVENOUS at 14:26

## 2020-07-14 RX ADMIN — Medication 4 MG: at 16:02

## 2020-07-14 RX ADMIN — ONDANSETRON 4 MG: 2 INJECTION INTRAMUSCULAR; INTRAVENOUS at 20:55

## 2020-07-14 RX ADMIN — Medication 1 ML: at 21:57

## 2020-07-14 RX ADMIN — PHENYLEPHRINE HYDROCHLORIDE 200 MCG: 10 INJECTION INTRAVENOUS at 13:58

## 2020-07-14 RX ADMIN — PROPOFOL 100 MG: 10 INJECTION, EMULSION INTRAVENOUS at 13:51

## 2020-07-14 RX ADMIN — LIDOCAINE HYDROCHLORIDE 20 ML: 10 INJECTION, SOLUTION EPIDURAL; INFILTRATION; INTRACAUDAL; PERINEURAL at 14:18

## 2020-07-14 RX ADMIN — LIDOCAINE HYDROCHLORIDE 2 ML: 10 INJECTION, SOLUTION EPIDURAL; INFILTRATION; INTRACAUDAL; PERINEURAL at 12:39

## 2020-07-14 RX ADMIN — PHENYLEPHRINE HYDROCHLORIDE 0.2 MCG/KG/MIN: 10 INJECTION INTRAVENOUS at 13:58

## 2020-07-14 RX ADMIN — FENTANYL CITRATE 50 MCG: 50 INJECTION, SOLUTION INTRAMUSCULAR; INTRAVENOUS at 13:17

## 2020-07-14 RX ADMIN — PHENYLEPHRINE HYDROCHLORIDE 50 MCG: 10 INJECTION INTRAVENOUS at 15:45

## 2020-07-14 RX ADMIN — HEPARIN SODIUM 3000 UNITS: 1000 INJECTION INTRAVENOUS; SUBCUTANEOUS at 14:46

## 2020-07-14 RX ADMIN — BUDESONIDE 0.5 MG: 0.5 INHALANT RESPIRATORY (INHALATION) at 19:19

## 2020-07-14 RX ADMIN — HYDRALAZINE HYDROCHLORIDE 10 MG: 20 INJECTION INTRAMUSCULAR; INTRAVENOUS at 19:01

## 2020-07-14 RX ADMIN — PANTOPRAZOLE SODIUM 40 MG: 40 INJECTION, POWDER, FOR SOLUTION INTRAVENOUS at 22:05

## 2020-07-14 RX ADMIN — HEPARIN SODIUM 850 UNITS/HR: 10000 INJECTION, SOLUTION INTRAVENOUS at 21:59

## 2020-07-14 RX ADMIN — ROCURONIUM BROMIDE 10 MG: 10 INJECTION INTRAVENOUS at 14:20

## 2020-07-14 RX ADMIN — IOPAMIDOL 90 ML: 755 INJECTION, SOLUTION INTRAVENOUS at 15:54

## 2020-07-14 RX ADMIN — PHENYLEPHRINE HYDROCHLORIDE 100 MCG: 10 INJECTION INTRAVENOUS at 15:12

## 2020-07-14 RX ADMIN — HYDRALAZINE HYDROCHLORIDE 10 MG: 20 INJECTION INTRAMUSCULAR; INTRAVENOUS at 20:14

## 2020-07-14 RX ADMIN — FENTANYL CITRATE 25 MCG: 50 INJECTION, SOLUTION INTRAMUSCULAR; INTRAVENOUS at 13:51

## 2020-07-14 RX ADMIN — ONDANSETRON 4 MG: 2 INJECTION INTRAMUSCULAR; INTRAVENOUS at 15:52

## 2020-07-14 RX ADMIN — SODIUM CHLORIDE: 9 INJECTION, SOLUTION INTRAVENOUS at 15:20

## 2020-07-14 RX ADMIN — HYDROMORPHONE HYDROCHLORIDE 0.2 MG: 1 INJECTION, SOLUTION INTRAMUSCULAR; INTRAVENOUS; SUBCUTANEOUS at 19:43

## 2020-07-14 RX ADMIN — GLYCOPYRROLATE 0.6 MG: 0.2 INJECTION, SOLUTION INTRAMUSCULAR; INTRAVENOUS at 16:02

## 2020-07-14 RX ADMIN — LIDOCAINE HYDROCHLORIDE 60 MG: 20 INJECTION, SOLUTION INFILTRATION; PERINEURAL at 13:51

## 2020-07-14 RX ADMIN — ROCURONIUM BROMIDE 40 MG: 10 INJECTION INTRAVENOUS at 13:51

## 2020-07-14 RX ADMIN — PROTAMINE SULFATE 100 MG: 10 INJECTION, SOLUTION INTRAVENOUS at 15:07

## 2020-07-14 RX ADMIN — HEPARIN SODIUM 11000 UNITS: 1000 INJECTION INTRAVENOUS; SUBCUTANEOUS at 14:34

## 2020-07-14 ASSESSMENT — MIFFLIN-ST. JEOR: SCORE: 1365.12

## 2020-07-14 ASSESSMENT — ENCOUNTER SYMPTOMS: DYSRHYTHMIAS: 1

## 2020-07-14 ASSESSMENT — ACTIVITIES OF DAILY LIVING (ADL)
TRANSFERRING: 0-->INDEPENDENT
AMBULATION: 0-->INDEPENDENT

## 2020-07-14 NOTE — H&P
[facility]    History and Physical - Hospitalist Service       Date of Admission:  7/14/2020    Assessment & Plan   Shiraz Ingram is a 85 year old male admitted on 7/14/2020. He is admitted following TAVR for severe aortic stenosis    Severe Aortic Stenosis s/p TAVR 7/14/20  - Routine post-procedure cares per Cardiology  - Heparin gtt due to planned PPM placement in am      CHB: Recent h/o afib with bradycardia. Evaluated by EP 5/2020 and his metoprolol was d/c'd. Developed complete heart block during procedure. Temporary pacemaker placed for procedure and converted to fixed lead PPM.  - Hold AV fox blocking agents  - EP consult for PPM 7/15    Atrial Fibrillation  Left Atrial Appendage Thrombus: PTA Metoprolol had been held by cardiology due to bradycardia. Anticoagulated with Pradaxa prior to admission.   - Metoprolol on hold in the setting of CHB  - Heparin gtt while awaiting permanent PPM, to be resumed at 2200  - Tele    HFpEF: Most recent Echo with EF 50-55%. Currently appears euvolemic. No on diuretics PTA  - Monitor I&Os and daily weights    CAD with h/o remote CABG:  Recent coronary angiogram demonstrated severe three-vessel coronary disease, moderate to severe lesion of the right PDA branch of the SVG- right PDA- right PL Y graft, widely patent LIMA to the LAD, SVG to the OM [PTA regimen includes metoprolol 50 mg bid, lisinopril 20 mg/d, simvastatin 40 mg/d and ASA 81 mg/d]  - Continue PTA statin and ASA  - Continue to hold ACE and BB, resume as indicated per Cardiology    Essential HTN: Historically on Metoprolol 50 mg/d and lisinpril 20 mg/d. Both of these medications have been hold per patient. BP elevated post-op  - IV prn hydralazine. Monitor  - Consider re initiation of ACE/BB pending BP and PPM placement    Recent Hospitalization for Community Acquired pneumonia 7/3-7/5  Moderate Persistent Asthma: COVID-19 negative. Treated IV zosyn and transitioned to 5 days oral Levaquin  on discharge which he completed as outpatient. Denies recent URI symptoms or SOB  - Continue PTA Pulmicort and Duonebs    H/o GI Bleed 5/2020: Recent baseline Hgb 10-11  - Hgb 11.5--9.7  - Patient NPO following TAVR and planned PPM tomorrow. Convert PTA Protonix to IV  - Monitor Hgb with heparin gtt    CKD, III: Baseline Cr 1.5  - Monitor    Hearling Loss  - Uses ASL         Diet:   NPO  DVT Prophylaxis: Heparin gtt  Pak Catheter: not present  Code Status:            Disposition Plan   Expected discharge: Per Cardiology  Entered: Anamika Samson PA-C 07/14/2020, 5:09 PM     The patient's care was discussed with the Bedside Nurse and Patient.    Anamika Samson PA-C  [facility]    ______________________________________________________________________    Chief Complaint   S/p TAVR    History is obtained from the patient with assistance of     History of Present Illness   Shiraz Ingram is a 85 year old male with a PMH of severe AS, HFpEF, CKD III, Atrial Fibrillation, MAYA thrombus and recent GI bleed who is admitted following TAVR.  Has had recent issues with bradycardia and was seen by EP 5/13/20 and his metoprolol was discontinued and no PPM was recommended. Developed CHB during procedure and temporary PPM was placed on completion of procedure this was converted to fixed lead PPM with plan for EP consult tomorrow.    Presently, patient is evaluated in his hospital room. Complains of back pain due bedrest. Denies chest pain, dizziness or lightheadedness. No N/V.  States has fully recovered from recent pneumonia.       Review of Systems    The 10 point Review of Systems is negative other than noted in the HPI or here.     Past Medical History    I have reviewed this patient's medical history and updated it with pertinent information if needed.   Past Medical History:   Diagnosis Date     Allergic rhinitis, cause unspecified      Anemia, unspecified      Aortic stenosis      severe     Benign neoplasm of colon 1999    Ademonatous polyp     CKD (chronic kidney disease) stage 3, GFR 30-59 ml/min (H) 5/12/2011     Coronary atherosclerosis of unspecified type of vessel, native or graft     s/p CABG 2002     Hyperlipidemia LDL goal < 100      Hypertension goal BP (blood pressure) < 140/90      Localized osteoarthrosis not specified whether primary or secondary, lower leg     left knee     Moderate persistent asthma      Persistent atrial fibrillation (H)      Unspecified hearing loss        Past Surgical History   I have reviewed this patient's surgical history and updated it with pertinent information if needed.  Past Surgical History:   Procedure Laterality Date     C NONSPECIFIC PROCEDURE  5/02    Coronary artery bypass     CARDIAC SURGERY       COLONOSCOPY N/A 5/26/2020    Procedure: COLONOSCOPY;  Surgeon: Ignacio Fournier MD;  Location:  GI     CV ANGIOGRAM CORONARY GRAFT N/A 4/24/2020    Procedure: Angiogram Coronary Graft;  Surgeon: Addison Willard MD;  Location:  HEART CARDIAC CATH LAB     CV CORONARY ANGIOGRAM N/A 4/24/2020    Procedure: Coronary Angiogram;  Surgeon: Addison Willard MD;  Location:  HEART CARDIAC CATH LAB     CV RIGHT HEART CATH N/A 4/24/2020    Procedure: Right Heart Cath;  Surgeon: Addison Willard MD;  Location:  HEART CARDIAC CATH LAB     HC COLONOSCOPY THRU STOMA W BIOPSY/CAUTERY TUMOR/POLYP/LESION  5/03    Dr. Dow, Q 5 years     HC COLONOSCOPY THRU STOMA W BIOPSY/CAUTERY TUMOR/POLYP/LESION  12/199    Dr. Dow, one adenomatous polyp     HC ESOPHAGOSCOPY, DIAGNOSTIC  5/03    Dr. Dow, lower esophageal ring & mild gastritis     HERNIORRHAPHY INGUINAL Right 9/26/2016    Procedure: HERNIORRHAPHY INGUINAL;  Surgeon: Alexis Alexandra MD;  [facility]     LAPAROSCOPIC CHOLECYSTECTOMY  12/03    for biliary sludge       Social History   I have reviewed this patient's social history and updated it with pertinent information  if needed.      Family History   I have reviewed this patient's family history and updated it with pertinent information if needed.  Family History   Problem Relation Age of Onset     C.A.D. Father      Cancer Mother         breast cancer,  of pneumonia     Cerebrovascular Disease Son      CABG Son      Family History Negative Child      Family History Negative Sister      Heart Disease Brother        Prior to Admission Medications   Prior to Admission Medications   Prescriptions Last Dose Informant Patient Reported? Taking?   albuterol (PROAIR HFA/PROVENTIL HFA/VENTOLIN HFA) 108 (90 Base) MCG/ACT inhaler more than a week at PRN Self Yes Yes   Sig: Inhale 2 puffs into the lungs 4 times daily as needed for shortness of breath / dyspnea or wheezing   aspirin (ASA) 81 MG EC tablet 2020 at 0800 Self Yes Yes   Sig: Take 81 mg by mouth daily   budesonide (PULMICORT) 0.5 MG/2ML nebulizer solution 2020 at 0800 Self No Yes   Sig: Take 2 mLs (0.5 mg) by nebulization 2 times daily   calcium carbonate 600 mg-vitamin D 400 units (CALTRATE) 600-400 MG-UNIT per tablet 2020 at am Self Yes Yes   Sig: Take 1 tablet by mouth daily   dabigatran ANTICOAGULANT (PRADAXA) 150 MG capsule 7/10/2020 Self No Yes   Sig: Take 1 capsule (150 mg) by mouth 2 times daily Store in original 's bottle or blister pack; use within 120 days of opening.   ferrous sulfate (IRON) 325 (65 Fe) MG tablet 2020 at pm Self No Yes   Sig: TAKE 1 TABLET(325 MG) BY MOUTH TWICE DAILY   ipratropium - albuterol 0.5 mg/2.5 mg/3 mL (DUONEB) 0.5-2.5 (3) MG/3ML nebulization 2020 at 0800 Self No Yes   Sig: Inhale 1 vial (3 mLs) into the lungs 4 times daily   lisinopril (ZESTRIL) 20 MG tablet  Self Yes No   Sig: Take 20 mg by mouth daily   meclizine (ANTIVERT) 12.5 MG tablet more than a week at PRN Self Yes Yes   Sig: Take 12.5 mg by mouth 3 times daily as needed for dizziness   metoprolol tartrate (LOPRESSOR) 50 MG tablet  Self Yes No    Sig: Take 1 tablet (50 mg) by mouth daily --- HOLD UNTIL FOLLOW-UP WITH OUTPATIENT PROVIDERS ---   montelukast (SINGULAIR) 10 MG tablet 7/14/2020 at 0800 Self No Yes   Sig: TAKE 1 TABLET(10 MG) BY MOUTH DAILY   multivitamin, therapeutic (THERA-VIT) TABS tablet 7/13/2020 at am Self Yes Yes   Sig: Take 1 tablet by mouth every evening    pantoprazole (PROTONIX) 40 MG EC tablet 7/13/2020 at pm Self No Yes   Sig: Take 1 tablet (40 mg) by mouth 2 times daily (before meals)   simvastatin (ZOCOR) 40 MG tablet 7/13/2020 at pm Self No Yes   Sig: TAKE 1 TABLET(40 MG) BY MOUTH AT BEDTIME      Facility-Administered Medications: None     Allergies   Allergies   Allergen Reactions     No Known Drug Allergies        Physical Exam   Vital Signs: Temp: 97.6  F (36.4  C) Temp src: Temporal BP: (!) 173/81 Pulse: 71 Heart Rate: 72 Resp: 16 SpO2: 98 % O2 Device: None (Room air)    Weight: 157 lbs 5 oz    Constitutional: Alert, resting comfortably in NAD  HEENT: Head normocephalic, atraumatic. Eyes sclera non icteric. Oropharynx clear and most. Deaf  Respiratory: Normal effort, symmetric expansion, no crackles or wheezing  Cardiovascular: RRR, systolic murmur  GI: Non distended, normal bowels sounds, no tenderness or guarding  MSK: LE with trace edema. Dorsalis pedis pulse palpated bilaterally.   Skin/Integumen: Clear  Neuro: CN II-XII grossly intact  Psych:  Alert and oriented x 3. Normal affect          Data   Data reviewed today: I reviewed all medications, new labs and imaging results over the last 24 hours. I personally reviewed no images or EKG's today.    Recent Labs   Lab 07/14/20  1650 07/14/20  1147 07/13/20  1420 07/08/20  1205   WBC 3.4*  --  3.6* 4.7   HGB 9.7*  --  11.5* 11.2*   MCV 72*  --  72* 72*   *  --  168 160   INR  --   --  1.21*  --      --  142 139   POTASSIUM 4.1 4.4 4.2 4.2   CHLORIDE 110*  --  109 106   CO2 27  --  26 27   BUN 22  --  26 27   CR 1.36*  --  1.40* 1.53*   ANIONGAP 4  --  7 6   AYALA  8.4*  --  9.4 9.3   GLC 93  --  98 91   ALBUMIN 3.0*  --  3.6  --    PROTTOTAL 6.2*  --  7.8  --    BILITOTAL 0.5  --  0.6  --    ALKPHOS 60  --  82  --    ALT 21  --  28  --    AST 20  --  23  --      Recent Results (from the past 24 hour(s))   Transesophageal Echocardiogram    Narrative    [id number]  [id number]  [id number]  [id number]^JERSON^RANI^NORMA           [facility]  Echocardiography Laboratory  [address] [place] [id number]        Name: SRIKANTH OBANDO  MRN: [id number]  : 1934  Study Date: 2020 01:25 PM  Age: 85 yrs  Gender: Male  Patient Location: [facility]  Reason For Study: Severe aortic stenosis  Ordering Physician: RANI ARTHUR  Referring Physician: Dany Rodriguez  Performed By: Glenn Costa RDCS     BSA: 1.8 m2  Height: 68 in  Weight: 157 lb  HR: 72  _____________________________________________________________________________  __        Procedure  Complete ALINE Adult. ALINE Probe serial #[id number] was used during the procedure.  The heart rate, respiratory rate and response to care were monitored  throughout the procedure with the assistance of the nurse. 3D image  acquisition, reconstruction, and real-time interpretation was performed.  _____________________________________________________________________________  __        Interpretation Summary     PROCEDURE  Intra-procedural ALINE for transfemoral transcatheter aortic valve replacement  with 29-mm Medtronic valve for severe aortic stenosis.     PRE-PROCEDURAL FINDINGS:  1. Severe, calcific aortic stenosis with trace aortic regurgitation.  2. Normal left ventricular size and systolic function. LVEF 55-60%.  3. Trace mitral and tricuspid valve regurgitation.  4. The left atrium and left atrial appendage are severely dilated. Spontaneous  echo contrast in LA and appendage without visible thrombus.     POST-PROCEDURAL SURVEY:  1. 29-mm Medtronic valve successfully deployed.  2. Valve is well seated with good leaflet  motion. Mean gradient 2 mmHg. Trace  to mild amanda-prosthetic regurgitation (12 to 1 o'clock position).  3. Trace mitral regurgitation.  4. No pericardial effusion.     _____________________________________________________________________________  __        ALINE  Probe insertion by anesthesia staff. The transesophageal probe was passed  without difficulty. There were no complications associated with this  procedure.     Left Ventricle  The left ventricle is normal in size. Left ventricular systolic function is  normal. The visual ejection fraction is estimated at 55-60%. No regional wall  motion abnormalities noted. There is no thrombus seen in the left ventricle.     Right Ventricle  The right ventricle is normal size. The right ventricular systolic function is  normal. There is no mass or thrombus in the right ventricle.     Atria  The left atrium is severely dilated. Spontaneous contrast in left atrium. No  left atrial mass or thrombus visualized. The right atrium is mildly dilated.  No evidence of right atrial mass/thrombus. Intact atrial septum. A contrast  injection (Bubble Study) was performed that was negative for flow across the  interatrial septum. There is no color Doppler evidence of an atrial shunt. The  left atrial appendage was well visualized and free of thrombus. Spontaneous  contrast in left atrial appendage.        Mitral Valve  The mitral valve leaflets appear normal. There is no evidence of stenosis,  fluttering, or prolapse. There is trace mitral regurgitation. There is no  mitral valve stenosis.     Tricuspid Valve  Normal tricuspid valve. There is trace tricuspid regurgitation.     Aortic Valve  The aortic valve is trileaflet. There is trace aortic regurgitation. Severe  valvular aortic stenosis.     Pulmonic Valve  Normal pulmonic valve. There is trace pulmonic valvular regurgitation.     Vessels  The aortic root is normal size. Normal size ascending aorta. Normal pulmonary  venous drainage.         Pericardial/Pleural  There is no pericardial effusion.     Rhythm  The rhythm was atrial fibrillation.  _____________________________________________________________________________  __           Doppler Measurements & Calculations  Ao V2 max: 116.8 cm/sec  Ao max P.0 mmHg  Ao V2 mean: 72.6 cm/sec  Ao mean P.4 mmHg  Ao V2 VTI: 25.3 cm  LV V1 max P.5 mmHg  LV V1 max: 105.7 cm/sec  LV V1 VTI: 21.3 cm  AV Vishnu Ratio (DI): 0.90           _____________________________________________________________________________  __           Report approved by: Dr Kesha Moreira 2020 03:52 PM      Cardiac Catheterization    Narrative    Successful implantation of a 29 mm Medtronic Evolut Pro Plus valve.  Complete heart block that will require Electrophysiology consultation for   a permanent pacemaker.

## 2020-07-14 NOTE — OR NURSING
Dr. Araujo at bedside removing L femoral arterial and venous catheters.  Trailed lowering pacing rate, lowered from 80 to 60. Pt EKG showing partial native rhythm.

## 2020-07-14 NOTE — ANESTHESIA CARE TRANSFER NOTE
Patient: Shiraz Ingram    Procedure(s):  Transcatheter Aortic Valve Replacement    Diagnosis: Severe symptomatic aortic stenosis  Diagnosis Additional Information: No value filed.    Anesthesia Type:   General     Note:  Airway :Face Mask  Patient transferred to:PACU  Handoff Report: Identifed the Patient, Identified the Reponsible Provider, Reviewed the pertinent medical history, Discussed the surgical course, Reviewed Intra-OP anesthesia mangement and issues during anesthesia, Set expectations for post-procedure period and Allowed opportunity for questions and acknowledgement of understanding      Vitals: (Last set prior to Anesthesia Care Transfer)    CRNA VITALS  7/14/2020 1556 - 7/14/2020 1644      7/14/2020             Resp Rate (set):  10                Electronically Signed By: EVA Brambila CRNA  July 14, 2020  4:44 PM

## 2020-07-14 NOTE — ANESTHESIA POSTPROCEDURE EVALUATION
Patient: Shiraz Ingram    Procedure(s):  Transcatheter Aortic Valve Replacement    Diagnosis:Severe symptomatic aortic stenosis  Diagnosis Additional Information: No value filed.    Anesthesia Type:  General    Note:  Anesthesia Post Evaluation    Patient location during evaluation: PACU  Patient participation: Able to fully participate in evaluation  Level of consciousness: awake  Pain management: adequate  Airway patency: patent  Cardiovascular status: acceptable  Respiratory status: acceptable  Hydration status: acceptable  PONV: none     Anesthetic complications: None          Last vitals:  Vitals:    07/14/20 1121   BP: (!) 173/81   Pulse: 71   Resp: 16   Temp: 36.4  C (97.6  F)   SpO2: 98%         Electronically Signed By: Philomena Andrews MD, MD  July 14, 2020  4:37 PM

## 2020-07-14 NOTE — PROGRESS NOTES
Medication History Completed by Medication Scribe  Admission medication history interview status for the (Not on file)  admission is complete. See EPIC admission navigator for prior to admission medications     Medication history sources: Patient's family/friend (Pt's son PRANEETH), Surescripts and H&P  Medication history source reliability: Good  Adherence assessment: N/A Not Observed    Significant changes made to the medication list:  None      Additional medication history information:   Pt's son states that Lisinopril and Metoprolol were held several weeks ago. He says these are to be reevaluated by the MD after surgery; I left these on the PTA med list (taking box unchecked)    Medication reconciliation completed by provider prior to medication history? No    Time spent in this activity: 40 minutes      Prior to Admission medications    Medication Sig Last Dose Taking? Auth Provider   albuterol (PROAIR HFA/PROVENTIL HFA/VENTOLIN HFA) 108 (90 Base) MCG/ACT inhaler Inhale 2 puffs into the lungs 4 times daily as needed for shortness of breath / dyspnea or wheezing more than a week at PRN Yes Reported, Patient   aspirin (ASA) 81 MG EC tablet Take 81 mg by mouth daily 7/14/2020 at 0800 Yes Reported, Patient   budesonide (PULMICORT) 0.5 MG/2ML nebulizer solution Take 2 mLs (0.5 mg) by nebulization 2 times daily 7/14/2020 at 0800 Yes Dany Rodriguez MD   calcium carbonate 600 mg-vitamin D 400 units (CALTRATE) 600-400 MG-UNIT per tablet Take 1 tablet by mouth daily 7/13/2020 at am Yes Unknown, Entered By History   dabigatran ANTICOAGULANT (PRADAXA) 150 MG capsule Take 1 capsule (150 mg) by mouth 2 times daily Store in original 's bottle or blister pack; use within 120 days of opening. 7/10/2020 Yes Ryan Samuel MD   ferrous sulfate (IRON) 325 (65 Fe) MG tablet TAKE 1 TABLET(325 MG) BY MOUTH TWICE DAILY 7/13/2020 at pm Yes Dany Rodriguez MD   ipratropium - albuterol 0.5 mg/2.5  mg/3 mL (DUONEB) 0.5-2.5 (3) MG/3ML nebulization Inhale 1 vial (3 mLs) into the lungs 4 times daily 7/14/2020 at 0800 Yes Dany Rodriguez MD   meclizine (ANTIVERT) 12.5 MG tablet Take 12.5 mg by mouth 3 times daily as needed for dizziness more than a week at PRN Yes Reported, Patient   montelukast (SINGULAIR) 10 MG tablet TAKE 1 TABLET(10 MG) BY MOUTH DAILY 7/14/2020 at 0800 Yes Dany Rodriguez MD   multivitamin, therapeutic (THERA-VIT) TABS tablet Take 1 tablet by mouth every evening  7/13/2020 at am Yes Unknown, Entered By History   pantoprazole (PROTONIX) 40 MG EC tablet Take 1 tablet (40 mg) by mouth 2 times daily (before meals) 7/13/2020 at pm Yes Gary Alonso MD   simvastatin (ZOCOR) 40 MG tablet TAKE 1 TABLET(40 MG) BY MOUTH AT BEDTIME 7/13/2020 at pm Yes Dany Rodriguez MD   lisinopril (ZESTRIL) 20 MG tablet Take 20 mg by mouth daily   Unknown, Entered By History   metoprolol tartrate (LOPRESSOR) 50 MG tablet Take 1 tablet (50 mg) by mouth daily --- HOLD UNTIL FOLLOW-UP WITH OUTPATIENT PROVIDERS ---   Arcadio Alford MD

## 2020-07-14 NOTE — PRE-PROCEDURE
GENERAL PRE-PROCEDURE:   Procedure:  Transcatheter transaortic valve replacement  Date/Time:  7/14/2020 1:24 PM    Written consent obtained?: Yes    Risks and benefits: Risks, benefits and alternatives were discussed    Consent given by:  Patient  Patient states understanding of procedure being performed: Yes    Patient's understanding of procedure matches consent: Yes    Procedure consent matches procedure scheduled: Yes    Expected level of sedation:  Deep (General anesthesia)  Appropriately NPO:  Yes  ASA Class:  Class 3- Severe systemic disease, definite functional limitations  Mallampati  :  Grade 3- soft palate visible, posterior pharyngeal wall not visible  Lungs:  Lungs clear with good breath sounds bilaterally  Heart:  Normal heart sounds and rate, a-fib and systolic murmur  History & Physical reviewed:  History and physical reviewed and no updates needed  Statement of review:  I have reviewed the lab findings, diagnostic data, medications, and the plan for sedation

## 2020-07-14 NOTE — ANESTHESIA PREPROCEDURE EVALUATION
Anesthesia Pre-Procedure Evaluation    Patient: Shiraz Ingram   MRN: [id number] : 1934          Preoperative Diagnosis: heart valve condition    Procedure(s):  Transcatheter Aortic Valve Replacement    Past Medical History:   Diagnosis Date     Allergic rhinitis, cause unspecified      Anemia, unspecified      Aortic stenosis     severe     Benign neoplasm of colon     Ademonatous polyp     CKD (chronic kidney disease) stage 3, GFR 30-59 ml/min (H) 2011     Coronary atherosclerosis of unspecified type of vessel, native or graft     s/p CABG      Hyperlipidemia LDL goal < 100      Hypertension goal BP (blood pressure) < 140/90      Localized osteoarthrosis not specified whether primary or secondary, lower leg     left knee     Moderate persistent asthma      Persistent atrial fibrillation (H)      Unspecified hearing loss      Past Surgical History:   Procedure Laterality Date     C NONSPECIFIC PROCEDURE      Coronary artery bypass     CARDIAC SURGERY       COLONOSCOPY N/A 2020    Procedure: COLONOSCOPY;  Surgeon: Ignacio Fournier MD;  Location:  GI     CV ANGIOGRAM CORONARY GRAFT N/A 2020    Procedure: Angiogram Coronary Graft;  Surgeon: Addison Willard MD;  Location:  HEART CARDIAC CATH LAB     CV CORONARY ANGIOGRAM N/A 2020    Procedure: Coronary Angiogram;  Surgeon: Addison Willard MD;  Location:  HEART CARDIAC CATH LAB     CV RIGHT HEART CATH N/A 2020    Procedure: Right Heart Cath;  Surgeon: Addison Willard MD;  Location:  HEART CARDIAC CATH LAB     HC COLONOSCOPY THRU STOMA W BIOPSY/CAUTERY TUMOR/POLYP/LESION      Dr. Dow, Q 5 years     HC COLONOSCOPY THRU STOMA W BIOPSY/CAUTERY TUMOR/POLYP/LESION      Dr. Dow, one adenomatous polyp     HC ESOPHAGOSCOPY, DIAGNOSTIC      Dr. Dow, lower esophageal ring & mild gastritis     HERNIORRHAPHY INGUINAL Right 2016    Procedure: HERNIORRHAPHY INGUINAL;  Surgeon:  "Alexis Alexandra MD;  Location: [facility]     LAPAROSCOPIC CHOLECYSTECTOMY  12/03    for biliary sludge       Anesthesia Evaluation     .             ROS/MED HX    ENT/Pulmonary:     (+)asthma , recent URI . .   (-) sleep apnea   Neurologic:       Cardiovascular:     (+) hypertension--CAD, -CABG-. : . CHF . . :. dysrhythmias a-fib, valvular problems/murmurs type: AS .       METS/Exercise Tolerance:     Hematologic:     (+) Anemia, -      Musculoskeletal:         GI/Hepatic:         Renal/Genitourinary:     (+) chronic renal disease, type: CRI,       Endo:         Psychiatric:         Infectious Disease:         Malignancy:         Other:                                 Lab Results   Component Value Date    WBC 3.6 (L) 07/13/2020    HGB 11.5 (L) 07/13/2020    HCT 36.6 (L) 07/13/2020     07/13/2020     07/13/2020    POTASSIUM 4.4 07/14/2020    CHLORIDE 109 07/13/2020    CO2 26 07/13/2020    BUN 26 07/13/2020    CR 1.40 (H) 07/13/2020    GLC 98 07/13/2020    AYALA 9.4 07/13/2020    PHOS 2.7 05/25/2020    MAG 1.9 05/25/2020    ALBUMIN 3.6 07/13/2020    PROTTOTAL 7.8 07/13/2020    ALT 28 07/13/2020    AST 23 07/13/2020    ALKPHOS 82 07/13/2020    BILITOTAL 0.6 07/13/2020    LIPASE 125 02/19/2013    INR 1.21 (H) 07/13/2020    TSH 1.66 02/21/2013       Preop Vitals  BP Readings from Last 3 Encounters:   07/14/20 (!) 173/81   07/13/20 132/64   07/08/20 134/87    Pulse Readings from Last 3 Encounters:   07/14/20 71   07/13/20 56   07/08/20 74      Resp Readings from Last 3 Encounters:   07/14/20 16   07/05/20 18   06/02/20 18    SpO2 Readings from Last 3 Encounters:   07/14/20 98%   07/08/20 96%   07/05/20 94%      Temp Readings from Last 1 Encounters:   07/14/20 36.4  C (97.6  F) (Temporal)    Ht Readings from Last 1 Encounters:   07/14/20 1.715 m (5' 7.5\")      Wt Readings from Last 1 Encounters:   07/14/20 71.4 kg (157 lb 5 oz)    Estimated body mass index is 24.27 kg/m  as calculated from the following:    " "Height as of this encounter: 1.715 m (5' 7.5\").    Weight as of this encounter: 71.4 kg (157 lb 5 oz).       Anesthesia Plan      History & Physical Review  History and physical reviewed and following examination; no interval change.    ASA Status:  4 .    NPO Status:  > 8 hours    Plan for General with Intravenous induction. Maintenance will be Balanced.    PONV prophylaxis:  Ondansetron (or other 5HT-3) and Dexamethasone or Solumedrol  Additional equipment: Arterial Line        Postoperative Care  Postoperative pain management:  IV analgesics and Oral pain medications.      Consents  Anesthetic plan, risks, benefits and alternatives discussed with:  Patient..                 Philomena Andrews MD, MD  "

## 2020-07-15 ENCOUNTER — OFFICE VISIT (OUTPATIENT)
Dept: INTERPRETER SERVICES | Facility: CLINIC | Age: 85
End: 2020-07-15
Payer: COMMERCIAL

## 2020-07-15 ENCOUNTER — APPOINTMENT (OUTPATIENT)
Dept: CARDIOLOGY | Facility: CLINIC | Age: 85
DRG: 267 | End: 2020-07-15
Payer: COMMERCIAL

## 2020-07-15 ENCOUNTER — SURGERY (OUTPATIENT)
Age: 85
End: 2020-07-15
Payer: COMMERCIAL

## 2020-07-15 DIAGNOSIS — Z95.2 S/P TAVR (TRANSCATHETER AORTIC VALVE REPLACEMENT): Primary | ICD-10-CM

## 2020-07-15 LAB
ANION GAP SERPL CALCULATED.3IONS-SCNC: 7 MMOL/L (ref 3–14)
BUN SERPL-MCNC: 23 MG/DL (ref 7–30)
CALCIUM SERPL-MCNC: 9 MG/DL (ref 8.5–10.1)
CHLORIDE SERPL-SCNC: 110 MMOL/L (ref 94–109)
CO2 SERPL-SCNC: 26 MMOL/L (ref 20–32)
CREAT SERPL-MCNC: 1.41 MG/DL (ref 0.66–1.25)
ERYTHROCYTE [DISTWIDTH] IN BLOOD BY AUTOMATED COUNT: 18.8 % (ref 10–15)
GFR SERPL CREATININE-BSD FRML MDRD: 45 ML/MIN/{1.73_M2}
GLUCOSE BLDC GLUCOMTR-MCNC: 103 MG/DL (ref 70–99)
GLUCOSE BLDC GLUCOMTR-MCNC: 88 MG/DL (ref 70–99)
GLUCOSE SERPL-MCNC: 95 MG/DL (ref 70–99)
HCT VFR BLD AUTO: 33.7 % (ref 40–53)
HGB BLD-MCNC: 10.4 G/DL (ref 13.3–17.7)
LMWH PPP CHRO-ACNC: 0.25 IU/ML
MAGNESIUM SERPL-MCNC: 2.1 MG/DL (ref 1.6–2.3)
MCH RBC QN AUTO: 22.8 PG (ref 26.5–33)
MCHC RBC AUTO-ENTMCNC: 30.9 G/DL (ref 31.5–36.5)
MCV RBC AUTO: 74 FL (ref 78–100)
PHOSPHATE SERPL-MCNC: 4.6 MG/DL (ref 2.5–4.5)
PLATELET # BLD AUTO: 156 10E9/L (ref 150–450)
POTASSIUM SERPL-SCNC: 4.8 MMOL/L (ref 3.4–5.3)
RBC # BLD AUTO: 4.56 10E12/L (ref 4.4–5.9)
SODIUM SERPL-SCNC: 143 MMOL/L (ref 133–144)
WBC # BLD AUTO: 6.3 10E9/L (ref 4–11)

## 2020-07-15 PROCEDURE — 99233 SBSQ HOSP IP/OBS HIGH 50: CPT | Mod: 24 | Performed by: INTERNAL MEDICINE

## 2020-07-15 PROCEDURE — 40000275 ZZH STATISTIC RCP TIME EA 10 MIN

## 2020-07-15 PROCEDURE — 94640 AIRWAY INHALATION TREATMENT: CPT

## 2020-07-15 PROCEDURE — 25000125 ZZHC RX 250: Performed by: INTERNAL MEDICINE

## 2020-07-15 PROCEDURE — 93005 ELECTROCARDIOGRAM TRACING: CPT

## 2020-07-15 PROCEDURE — 25000132 ZZH RX MED GY IP 250 OP 250 PS 637: Performed by: HOSPITALIST

## 2020-07-15 PROCEDURE — 02HK3JZ INSERTION OF PACEMAKER LEAD INTO RIGHT VENTRICLE, PERCUTANEOUS APPROACH: ICD-10-PCS | Performed by: INTERNAL MEDICINE

## 2020-07-15 PROCEDURE — 21000000 ZZH R&B IMCU HEART CARE

## 2020-07-15 PROCEDURE — 33207 INSERT HEART PM VENTRICULAR: CPT | Mod: KX | Performed by: INTERNAL MEDICINE

## 2020-07-15 PROCEDURE — 83735 ASSAY OF MAGNESIUM: CPT | Performed by: STUDENT IN AN ORGANIZED HEALTH CARE EDUCATION/TRAINING PROGRAM

## 2020-07-15 PROCEDURE — C1898 LEAD, PMKR, OTHER THAN TRANS: HCPCS | Performed by: INTERNAL MEDICINE

## 2020-07-15 PROCEDURE — 00000146 ZZHCL STATISTIC GLUCOSE BY METER IP

## 2020-07-15 PROCEDURE — 99223 1ST HOSP IP/OBS HIGH 75: CPT | Mod: 25 | Performed by: INTERNAL MEDICINE

## 2020-07-15 PROCEDURE — 93321 DOPPLER ECHO F-UP/LMTD STD: CPT | Mod: 26 | Performed by: INTERNAL MEDICINE

## 2020-07-15 PROCEDURE — 33207 INSERT HEART PM VENTRICULAR: CPT | Performed by: INTERNAL MEDICINE

## 2020-07-15 PROCEDURE — C9113 INJ PANTOPRAZOLE SODIUM, VIA: HCPCS | Performed by: PHYSICIAN ASSISTANT

## 2020-07-15 PROCEDURE — C1786 PMKR, SINGLE, RATE-RESP: HCPCS | Performed by: INTERNAL MEDICINE

## 2020-07-15 PROCEDURE — 0JH604Z INSERTION OF PACEMAKER, SINGLE CHAMBER INTO CHEST SUBCUTANEOUS TISSUE AND FASCIA, OPEN APPROACH: ICD-10-PCS | Performed by: INTERNAL MEDICINE

## 2020-07-15 PROCEDURE — 94640 AIRWAY INHALATION TREATMENT: CPT | Mod: 76

## 2020-07-15 PROCEDURE — 36415 COLL VENOUS BLD VENIPUNCTURE: CPT | Performed by: STUDENT IN AN ORGANIZED HEALTH CARE EDUCATION/TRAINING PROGRAM

## 2020-07-15 PROCEDURE — 93010 ELECTROCARDIOGRAM REPORT: CPT | Performed by: INTERNAL MEDICINE

## 2020-07-15 PROCEDURE — 25000128 H RX IP 250 OP 636: Performed by: INTERNAL MEDICINE

## 2020-07-15 PROCEDURE — 99152 MOD SED SAME PHYS/QHP 5/>YRS: CPT | Mod: 59 | Performed by: INTERNAL MEDICINE

## 2020-07-15 PROCEDURE — 93308 TTE F-UP OR LMTD: CPT | Mod: 26 | Performed by: INTERNAL MEDICINE

## 2020-07-15 PROCEDURE — C1894 INTRO/SHEATH, NON-LASER: HCPCS | Performed by: INTERNAL MEDICINE

## 2020-07-15 PROCEDURE — 85027 COMPLETE CBC AUTOMATED: CPT | Performed by: STUDENT IN AN ORGANIZED HEALTH CARE EDUCATION/TRAINING PROGRAM

## 2020-07-15 PROCEDURE — 80048 BASIC METABOLIC PNL TOTAL CA: CPT | Performed by: STUDENT IN AN ORGANIZED HEALTH CARE EDUCATION/TRAINING PROGRAM

## 2020-07-15 PROCEDURE — 25000128 H RX IP 250 OP 636: Performed by: PHYSICIAN ASSISTANT

## 2020-07-15 PROCEDURE — 85520 HEPARIN ASSAY: CPT | Performed by: STUDENT IN AN ORGANIZED HEALTH CARE EDUCATION/TRAINING PROGRAM

## 2020-07-15 PROCEDURE — 27210794 ZZH OR GENERAL SUPPLY STERILE: Performed by: INTERNAL MEDICINE

## 2020-07-15 PROCEDURE — 99153 MOD SED SAME PHYS/QHP EA: CPT | Performed by: INTERNAL MEDICINE

## 2020-07-15 PROCEDURE — 93325 DOPPLER ECHO COLOR FLOW MAPG: CPT | Mod: 26 | Performed by: INTERNAL MEDICINE

## 2020-07-15 PROCEDURE — 99152 MOD SED SAME PHYS/QHP 5/>YRS: CPT | Performed by: INTERNAL MEDICINE

## 2020-07-15 PROCEDURE — 99232 SBSQ HOSP IP/OBS MODERATE 35: CPT | Performed by: PHYSICIAN ASSISTANT

## 2020-07-15 PROCEDURE — 84100 ASSAY OF PHOSPHORUS: CPT | Performed by: STUDENT IN AN ORGANIZED HEALTH CARE EDUCATION/TRAINING PROGRAM

## 2020-07-15 PROCEDURE — 25800029 ZZH RX IP 258 OP 250: Performed by: INTERNAL MEDICINE

## 2020-07-15 PROCEDURE — T1013 SIGN LANG/ORAL INTERPRETER: HCPCS | Mod: U3

## 2020-07-15 PROCEDURE — 93308 TTE F-UP OR LMTD: CPT

## 2020-07-15 PROCEDURE — 25000125 ZZHC RX 250: Performed by: PHYSICIAN ASSISTANT

## 2020-07-15 DEVICE — IMP LEAD PACING BIPOLAR CAPSUREFIX NOVUS 52CM 5076-52: Type: IMPLANTABLE DEVICE | Status: FUNCTIONAL

## 2020-07-15 DEVICE — PACEMAKER AZURE XT SR MRI SYS: Type: IMPLANTABLE DEVICE | Status: FUNCTIONAL

## 2020-07-15 RX ORDER — DOBUTAMINE HYDROCHLORIDE 200 MG/100ML
5-40 INJECTION INTRAVENOUS CONTINUOUS PRN
Status: DISCONTINUED | OUTPATIENT
Start: 2020-07-15 | End: 2020-07-15

## 2020-07-15 RX ORDER — CEFAZOLIN SODIUM 2 G/100ML
2 INJECTION, SOLUTION INTRAVENOUS
Status: COMPLETED | OUTPATIENT
Start: 2020-07-15 | End: 2020-07-15

## 2020-07-15 RX ORDER — FENTANYL CITRATE 50 UG/ML
INJECTION, SOLUTION INTRAMUSCULAR; INTRAVENOUS
Status: DISCONTINUED | OUTPATIENT
Start: 2020-07-15 | End: 2020-07-15

## 2020-07-15 RX ORDER — NALOXONE HYDROCHLORIDE 0.4 MG/ML
.1-.4 INJECTION, SOLUTION INTRAMUSCULAR; INTRAVENOUS; SUBCUTANEOUS
Status: DISCONTINUED | OUTPATIENT
Start: 2020-07-15 | End: 2020-07-16 | Stop reason: HOSPADM

## 2020-07-15 RX ORDER — HEPARIN SODIUM 200 [USP'U]/100ML
100-600 INJECTION, SOLUTION INTRAVENOUS CONTINUOUS PRN
Status: DISCONTINUED | OUTPATIENT
Start: 2020-07-15 | End: 2020-07-15

## 2020-07-15 RX ORDER — CEFAZOLIN SODIUM 1 G/3ML
1 INJECTION, POWDER, FOR SOLUTION INTRAMUSCULAR; INTRAVENOUS
Status: DISCONTINUED | OUTPATIENT
Start: 2020-07-15 | End: 2020-07-15

## 2020-07-15 RX ORDER — BUPIVACAINE HYDROCHLORIDE 2.5 MG/ML
INJECTION, SOLUTION EPIDURAL; INFILTRATION; INTRACAUDAL
Status: DISCONTINUED | OUTPATIENT
Start: 2020-07-15 | End: 2020-07-15

## 2020-07-15 RX ORDER — HEPARIN SODIUM 200 [USP'U]/100ML
100-500 INJECTION, SOLUTION INTRAVENOUS CONTINUOUS PRN
Status: DISCONTINUED | OUTPATIENT
Start: 2020-07-15 | End: 2020-07-15

## 2020-07-15 RX ORDER — CEFAZOLIN SODIUM 1 G/3ML
1 INJECTION, POWDER, FOR SOLUTION INTRAMUSCULAR; INTRAVENOUS EVERY 8 HOURS
Status: DISCONTINUED | OUTPATIENT
Start: 2020-07-15 | End: 2020-07-16 | Stop reason: HOSPADM

## 2020-07-15 RX ORDER — SODIUM CHLORIDE 450 MG/100ML
INJECTION, SOLUTION INTRAVENOUS CONTINUOUS
Status: DISCONTINUED | OUTPATIENT
Start: 2020-07-15 | End: 2020-07-15

## 2020-07-15 RX ORDER — OXYCODONE AND ACETAMINOPHEN 5; 325 MG/1; MG/1
1 TABLET ORAL EVERY 4 HOURS PRN
Status: DISCONTINUED | OUTPATIENT
Start: 2020-07-15 | End: 2020-07-16 | Stop reason: HOSPADM

## 2020-07-15 RX ORDER — LIDOCAINE 40 MG/G
CREAM TOPICAL
Status: DISCONTINUED | OUTPATIENT
Start: 2020-07-15 | End: 2020-07-15

## 2020-07-15 RX ADMIN — LIDOCAINE HYDROCHLORIDE 10 ML: 10 INJECTION, SOLUTION EPIDURAL; INFILTRATION; INTRACAUDAL; PERINEURAL at 14:44

## 2020-07-15 RX ADMIN — MONTELUKAST 10 MG: 10 TABLET, FILM COATED ORAL at 09:19

## 2020-07-15 RX ADMIN — IPRATROPIUM BROMIDE AND ALBUTEROL SULFATE 3 ML: .5; 3 SOLUTION RESPIRATORY (INHALATION) at 11:26

## 2020-07-15 RX ADMIN — FENTANYL CITRATE 50 MCG: 50 INJECTION, SOLUTION INTRAMUSCULAR; INTRAVENOUS at 14:35

## 2020-07-15 RX ADMIN — SIMVASTATIN 40 MG: 40 TABLET, FILM COATED ORAL at 22:08

## 2020-07-15 RX ADMIN — BUPIVACAINE HYDROCHLORIDE 10 ML: 2.5 INJECTION, SOLUTION EPIDURAL; INFILTRATION; INTRACAUDAL; PERINEURAL at 14:45

## 2020-07-15 RX ADMIN — FERROUS SULFATE TAB 325 MG (65 MG ELEMENTAL FE) 325 MG: 325 (65 FE) TAB at 09:19

## 2020-07-15 RX ADMIN — BUDESONIDE 0.5 MG: 0.5 INHALANT RESPIRATORY (INHALATION) at 07:10

## 2020-07-15 RX ADMIN — CEFAZOLIN 1 G: 330 INJECTION, POWDER, FOR SOLUTION INTRAMUSCULAR; INTRAVENOUS at 22:09

## 2020-07-15 RX ADMIN — Medication 1 TABLET: at 09:19

## 2020-07-15 RX ADMIN — MIDAZOLAM 1 MG: 1 INJECTION INTRAMUSCULAR; INTRAVENOUS at 14:35

## 2020-07-15 RX ADMIN — PANTOPRAZOLE SODIUM 40 MG: 40 INJECTION, POWDER, FOR SOLUTION INTRAVENOUS at 20:33

## 2020-07-15 RX ADMIN — IPRATROPIUM BROMIDE AND ALBUTEROL SULFATE 3 ML: .5; 3 SOLUTION RESPIRATORY (INHALATION) at 07:10

## 2020-07-15 RX ADMIN — THERA TABS 1 TABLET: TAB at 20:33

## 2020-07-15 RX ADMIN — SODIUM CHLORIDE: 4.5 INJECTION, SOLUTION INTRAVENOUS at 13:24

## 2020-07-15 RX ADMIN — IPRATROPIUM BROMIDE AND ALBUTEROL SULFATE 3 ML: .5; 3 SOLUTION RESPIRATORY (INHALATION) at 19:48

## 2020-07-15 RX ADMIN — CEFAZOLIN SODIUM 2 G: 2 INJECTION, SOLUTION INTRAVENOUS at 14:15

## 2020-07-15 RX ADMIN — FENTANYL CITRATE 25 MCG: 50 INJECTION, SOLUTION INTRAMUSCULAR; INTRAVENOUS at 14:46

## 2020-07-15 RX ADMIN — PANTOPRAZOLE SODIUM 40 MG: 40 INJECTION, POWDER, FOR SOLUTION INTRAVENOUS at 09:18

## 2020-07-15 RX ADMIN — MIDAZOLAM 0.5 MG: 1 INJECTION INTRAMUSCULAR; INTRAVENOUS at 14:46

## 2020-07-15 RX ADMIN — BUDESONIDE 0.5 MG: 0.5 INHALANT RESPIRATORY (INHALATION) at 19:48

## 2020-07-15 ASSESSMENT — MIFFLIN-ST. JEOR: SCORE: 1430.39

## 2020-07-15 NOTE — PROGRESS NOTES
Pt settled to room. BL monica WDL. Temp pacer in R internal jugular. Neuros unchanged.  at bedside.

## 2020-07-15 NOTE — PROGRESS NOTES
[facility] Heart Care- TAVR Progress Note     Date of Service: 07/15/20     HPI  Shiraz Ingram is a 85 year old male with a past medical history significant for aortic stenosis, CAD s/p CABG ( LIMA to LAD, vein graft to OM and RCA, atrial fibrillation w/ MAYA thrombus on chronic anticoagulation, hearing loss, chromic anemia, chronic diastolic heart failure, hypertension, asthma, chronic kidney disease stage III, recent GI bleed requiring transfusion was admitted on 7/14/2020 for elective Transfemoral Transcatheter Aortic Valve Replacement (TAVR).     Assessment and Plan  1) S/p TAVR using a 29 mm Medtronic Evolute Pro Plus Valve.      The TAVR procedure was complicated by complete heart block.  Temporary pacing wire was placed.  Today AV node conduction had partially recovered.  The pacing heart rate was decreased and he is currently not pacemaker dependent.      - ECG demonstrated Afib with RBBB, ~ pacing 40% of the time   - Intra Op Echocardiogram showed well seated valve, mean gradient 2 mmHG with trace to mild PVL   - Access Site : free of hematoma or bruit, positive peripheral pulses   - Neuro check : intact    - Antiplatelet therapy :currently Heparin and ASA, will resume Pradaxa at discharge + ASA    - CBC hgb 10.4, wbc 6.3, plt 156    - BMP : creatinine 1.4, GFR 45   - Temp IJ pacer     2) Coronary artery disease.  Remote history of CABG. Recent coronary angiogram demonstrated severe three-vessel coronary disease, moderate to severe lesion of the right PDA branch of the SVG- right PDA- right PL Y graft, widely patent LIMA to the LAD, SVG to the OM.    Aspirin and statin therapy.    3) Atrial fibrillation with known MAYA thrombus.  Currently on heparin infusion.  Should patient get pacemaker placement today, heparin will need to be stopped prior to implantation.  Prior to admission, he takes Pradaxa.      4) Hypertension.  Well-controlled.  PTA lisinopril 20 mg daily.    5)  Hyperlipidemia.  LDL 54.  Simvastatin 20 mg daily    6) CKD. Baseline creatinine 1.4.  Creatinine today 1.4, GFR 45    Plan  1. Echocardiogram today  2. EP consult to assess for the need of permanent pacemaker  3. If a pacemaker is not needed, will discontinue heparin drip and resume Pradaxa  4. Out of bed with cardiac rehab when able  5. Daily weights  6. Continue aspirin life long  7. Blood pressure well controlled at this time. However, if it increases, ok to resume PTA lisinopril 20 mg daily     Interval History: Resting in bed.   and son at the bedside    Physical Exam   Temp: 99.4  F (37.4  C) Temp src: Oral BP: 132/75 Pulse: 81 Heart Rate: 74 Resp: 11 SpO2: 96 % O2 Device: Nasal cannula Oxygen Delivery: 2 LPM  Vitals:    07/14/20 1121 07/15/20 0531   Weight: 71.4 kg (157 lb 5 oz) 77.9 kg (171 lb 11.2 oz)     Vital Signs with Ranges  Temp:  [97.6  F (36.4  C)-99.4  F (37.4  C)] 99.4  F (37.4  C)  Pulse:  [62-90] 81  Heart Rate:  [63-90] 74  Resp:  [8-33] 11  BP: (121-168)/() 132/75  SpO2:  [96 %-100 %] 96 %  I/O last 3 completed shifts:  In: 2000 [I.V.:2000]  Out: 825 [Urine:825]    Constitutional: awake, alert, cooperative, no apparent distress, and appears stated age  Respiratory: No increased work of breathing, good air exchange, clear to auscultation bilaterally, no crackles or wheezing  Cardiovascular: irregularly irregular rhythm, normal S1 and S2 and grade 1-2/6 CATALINA  GI: non-tender  Skin: no redness, warmth, or swelling, no bruit or hematoma in bilateral groins, positive pulses  Musculoskeletal: no lower extremity pitting edema present  Neurologic: Mental Status Exam:  Level of Alertness:   awake  Neuropsychiatric: Level of consciousness: alert / normal  Affect: normal    Medications     NaCl       HEParin 850 Units/hr (07/15/20 1012)     - MEDICATION INSTRUCTIONS -         budesonide  0.5 mg Nebulization BID     calcium carbonate 600 mg-vitamin D 400 units  1 tablet Oral Daily      ceFAZolin  2 g Intravenous Pre-Op/Pre-procedure x 1 dose     ferrous sulfate  325 mg Oral Daily     ipratropium - albuterol 0.5 mg/2.5 mg/3 mL  3 mL Nebulization 4x daily     montelukast  10 mg Oral Daily     multivitamin, therapeutic  1 tablet Oral QPM     pantoprazole (PROTONIX) IV  40 mg Intravenous BID     simvastatin  40 mg Oral At Bedtime     sodium chloride (PF)  3 mL Intracatheter Q8H       Data   Recent Labs   Lab 07/15/20  0527 07/14/20  1650 07/14/20  1147 07/13/20  1420   WBC 6.3 3.4*  --  3.6*   HGB 10.4* 9.7*  --  11.5*   MCV 74* 72*  --  72*    131*  --  168   INR  --   --   --  1.21*    141  --  142   POTASSIUM 4.8 4.1 4.4 4.2   CHLORIDE 110* 110*  --  109   CO2 26 27  --  26   BUN 23 22  --  26   CR 1.41* 1.36*  --  1.40*   ANIONGAP 7 4  --  7   AYALA 9.0 8.4*  --  9.4   GLC 95 93  --  98   ALBUMIN  --  3.0*  --  3.6   PROTTOTAL  --  6.2*  --  7.8   BILITOTAL  --  0.5  --  0.6   ALKPHOS  --  60  --  82   ALT  --  21  --  28   AST  --  20  --  23           EVA Gomez, CNP  Pager [phone]  7/15/2020

## 2020-07-15 NOTE — PLAN OF CARE
Shiraz is alert, oriented, pleasant, and communicates with the help of an ASL interpretor. VSS on room air. He did require PRN hydralazine x 2 to maintain SBP<140. Reported back pain while laying flat, Dilaudid x 1 effective.                     He did have one episode of left facial numbness (reported it as 'just feels different'). House officer called to assess; no imaging or orders received. The sensation was transient and resolved on its own. 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 Otherwise, neuros intact.     He had one episode of nausea and vomiting after eating ice chips. Zofran and minimal fluids effective. Chloraseptic spray helps sore throat.    Straight cath for urine x 1. He has been able to urinate after, reports some mild dysuria.    Temporary pacemaker in place in right internal jugular. Groin sites slightly ecchymotic (L>R). CMS intact. BLE +2 edema in ankles and feet. Telemetry shows some paced and some native beats. NPO for permanent pacemaker placement today. Interpretor scheduled. Bed alarm for safety.

## 2020-07-15 NOTE — CONSULTS
Electrophysiology/ Cardiology- Consult Note         H&P and Plan:     Reason for consult: AV block.      History of present illness: 85-year old gentleman with a history of hypertension, hyperlipidemia, paroxysmal atrial fibrillation, chronic kidney disease, coronary disease (CABG 2002) and severe aortic stenosis, who underwent TAVR yesterday and procedure was complicated by heart block.  Temporary pacing wire was placed and EP was consult today to help with management.    At the moment, he is doing well.  Patient is known to have conduction disease (underlying right bundle branch block and left anterior fascicular block and developed complete heart block during TAVR procedure yesterday.  However, today AV node conduction seems to be partially recovered.  Pacing heart rate was decreased and he does not seem to be pacemaker dependent at this time.      He denies any symptoms such as chest pain, shortness of breath, lightheadedness, near-syncope or syncope.    EKG this morning shows atrial fibrillation with underlying right bundle branch block and left anterior fascicular block. ALINE done yesterday showed normal LV function with EF estimated 55-60%.  There was severe calcified aortic stenosis.    Plan:  1.  AV block post TAVR.  She is known to have AV node conduction disease (underlying right bundle branch block and left anterior fascicular block).  Developed complete AV block during TAVR procedure, however AV node conduction seems to be partially improved.  Today, we discussed options including continue observation for another 24 hours and potentially remove the temporary pacing wire or pursue permanent pacemaker implantation.    Patient expressed that he is very concerned about developing worsening AV node conduction.  He had a history of having cardiac arrest in the past and is concerned of not having a pacemaker implanted for backup.    He has permanent atrial fibrillation and I believe he will benefit from a  "pacemaker to facilitate therapy for A. fib.  I explained a pacemaker implantation procedure in details.  He understand there is a 1-2% risk complications here procedure.  Consent was signed.  We will make arrangements to have pacemaker implanted today.      Tommie Sauer MD    Physical Exam:  Vitals: /61   Pulse 85   Temp 99.4  F (37.4  C) (Oral)   Resp 20   Ht 1.715 m (5' 7.5\")   Wt 77.9 kg (171 lb 11.2 oz)   SpO2 98%   BMI 26.50 kg/m        Intake/Output Summary (Last 24 hours) at 7/15/2020 0958  Last data filed at 7/15/2020 0644  Gross per 24 hour   Intake 2000 ml   Output 825 ml   Net 1175 ml     Vitals:    07/14/20 1121 07/15/20 0531   Weight: 71.4 kg (157 lb 5 oz) 77.9 kg (171 lb 11.2 oz)       Constitutional:  AAO x3.  Pt is in NAD.  HEAD: Normocephalic.  SKIN: Skin normal color, texture and turgor with no lesions or eruptions.  Eyes: PERRL, EOMI.  ENT:  Supple, normal JVP. No lymphadenopathy or thyroid enlargement.  Chest:  CTAB.  Cardiac:  IIR, variable sound of S1 and Normal  Systolic murmur in LUSB II/VI.  Abdomen:  Normal BS.  Soft, non-tender and non-distended.  No rebound or guarding.    Extremities:  Pedious pulses palpable B/L.  No LE edema noticed.   Neurological: Strength and sensation grossly symmetric and intact throughout.         Review of Systems:  Complete review of system is otherwise negative with the exception of what was described above.     CURRENT MEDICATIONS:    budesonide  0.5 mg Nebulization BID     calcium carbonate 600 mg-vitamin D 400 units  1 tablet Oral Daily     ferrous sulfate  325 mg Oral Daily     ipratropium - albuterol 0.5 mg/2.5 mg/3 mL  3 mL Nebulization 4x daily     montelukast  10 mg Oral Daily     multivitamin, therapeutic  1 tablet Oral QPM     pantoprazole (PROTONIX) IV  40 mg Intravenous BID     simvastatin  40 mg Oral At Bedtime     PRN Meds: acetaminophen, acetaminophen, albuterol, bisacodyl, HOLD MEDICATION, hydrALAZINE, HYDROcodone-acetaminophen, " HYDROmorphone, lidocaine 4%, lidocaine (buffered or not buffered), melatonin, naloxone, nitroGLYcerin, ondansetron **OR** ondansetron, - MEDICATION INSTRUCTIONS -, phenol, polyethylene glycol, senna-docusate **OR** senna-docusate, sodium chloride (PF)    ALLERGIES     Allergies   Allergen Reactions     No Known Drug Allergies        PAST MEDICAL HISTORY:  Past Medical History:   Diagnosis Date     Allergic rhinitis, cause unspecified      Anemia, unspecified      Aortic stenosis     severe     Benign neoplasm of colon 1999    Ademonatous polyp     CKD (chronic kidney disease) stage 3, GFR 30-59 ml/min (H) 5/12/2011     Coronary atherosclerosis of unspecified type of vessel, native or graft     s/p CABG 2002     Hyperlipidemia LDL goal < 100      Hypertension goal BP (blood pressure) < 140/90      Localized osteoarthrosis not specified whether primary or secondary, lower leg     left knee     Moderate persistent asthma      Persistent atrial fibrillation (H)      Unspecified hearing loss        PAST SURGICAL HISTORY:  Past Surgical History:   Procedure Laterality Date     C NONSPECIFIC PROCEDURE  5/02    Coronary artery bypass     CARDIAC SURGERY       COLONOSCOPY N/A 5/26/2020    Procedure: COLONOSCOPY;  Surgeon: Ignacio Fournier MD;  Location:  GI     CV ANGIOGRAM CORONARY GRAFT N/A 4/24/2020    Procedure: Angiogram Coronary Graft;  Surgeon: Addison Willard MD;  Location:  HEART CARDIAC CATH LAB     CV CORONARY ANGIOGRAM N/A 4/24/2020    Procedure: Coronary Angiogram;  Surgeon: Addison Willard MD;  Location:  HEART CARDIAC CATH LAB     CV RIGHT HEART CATH N/A 4/24/2020    Procedure: Right Heart Cath;  Surgeon: Addison Willard MD;  Location:  HEART CARDIAC CATH LAB     HC COLONOSCOPY THRU STOMA W BIOPSY/CAUTERY TUMOR/POLYP/LESION  5/03    Dr. Dow, Q 5 years     HC COLONOSCOPY THRU STOMA W BIOPSY/CAUTERY TUMOR/POLYP/LESION  12/199    Dr. Dow, one adenomatous polyp       ESOPHAGOSCOPY, DIAGNOSTIC      Dr. Dow, lower esophageal ring & mild gastritis     HERNIORRHAPHY INGUINAL Right 2016    Procedure: HERNIORRHAPHY INGUINAL;  Surgeon: Alexis Alexandra MD;  [facility]     LAPAROSCOPIC CHOLECYSTECTOMY      for biliary sludge       FAMILY HISTORY:  Family History   Problem Relation Age of Onset     C.A.D. Father      Cancer Mother         breast cancer,  of pneumonia     Cerebrovascular Disease Son      CABG Son      Family History Negative Child      Family History Negative Sister      Heart Disease Brother        SOCIAL HISTORY:  Social History     Socioeconomic History     Marital status:      Spouse name: Kailey     Number of children: 3     Years of education: None     Highest education level: None   Occupational History     Occupation: [employer]     Employer: RETIRED   Social Needs     Financial resource strain: None     Food insecurity     Worry: None     Inability: None     Transportation needs     Medical: None     Non-medical: None   Tobacco Use     Smoking status: Former Smoker     Types: Cigarettes     Smokeless tobacco: Never Used     Tobacco comment: smoked for a week in 's   Substance and Sexual Activity     Alcohol use: Yes     Alcohol/week: 0.0 standard drinks     Drug use: No     Sexual activity: Yes     Partners: Female   Lifestyle     Physical activity     Days per week: None     Minutes per session: None     Stress: None   Relationships     Social connections     Talks on phone: None     Gets together: None     Attends [employer] service: None     Active member of club or organization: None     Attends meetings of clubs or organizations: None     Relationship status: None     Intimate partner violence     Fear of current or ex partner: None     Emotionally abused: None     Physically abused: None     Forced sexual activity: None   Other Topics Concern      Service No     Blood Transfusions No     Caffeine Concern Not  Asked     Occupational Exposure Not Asked     Hobby Hazards Not Asked     Sleep Concern Not Asked     Stress Concern Not Asked     Weight Concern Not Asked     Special Diet Not Asked     Back Care Not Asked     Exercise Yes     Bike Helmet Not Asked     Seat Belt Yes     Self-Exams No     Parent/sibling w/ CABG, MI or angioplasty before 65F 55M? Yes   Social History Narrative    Balanced Diet - Yes    Osteoporosis Preventative measures-  Dairy servings per day: 2 to 3 servings daily    Regular Exercise -  Yes Describe walks 1.5 mile daily     Dental Exam up - YES - Date: 2 years ago    Eye Exam - YES - Date: 1 year ago    Self Testicular Exam -  No, handout given    Do you have any concerns about STD's -  No    Abuse: Current or Past (Physical, Sexual or Emotional)- No    Do you feel safe in your environment - Yes    Guns stored in the home - Yes, locked    Sunscreen used - No    Seatbelts used - Yes    Lipids - YES - Date: 3-09    Glucose -  YES - Date: 3-09    Colon Cancer Screening - Colonoscopy 3-08(date completed)    Hemoccults - UNKNOWN    PSA - YES - Date: 11-07    Digital Rectal Exam - UNKNOWN    Immunizations reviewed and up to date - Yes, td 1-2005, had shingles vaccine    5-28-09  JANEY Patel [facility]                         Recent Lab Results:  Recent Labs   Lab 07/15/20  0527 07/14/20  1650 07/14/20  1147 07/13/20  1420   WBC 6.3 3.4*  --  3.6*   HGB 10.4* 9.7*  --  11.5*   MCV 74* 72*  --  72*    131*  --  168   INR  --   --   --  1.21*    141  --  142   POTASSIUM 4.8 4.1 4.4 4.2   CHLORIDE 110* 110*  --  109   CO2 26 27  --  26   BUN 23 22  --  26   CR 1.41* 1.36*  --  1.40*   ANIONGAP 7 4  --  7   AYALA 9.0 8.4*  --  9.4   GLC 95 93  --  98   ALBUMIN  --  3.0*  --  3.6   PROTTOTAL  --  6.2*  --  7.8   BILITOTAL  --  0.5  --  0.6   ALKPHOS  --  60  --  82   ALT  --  21  --  28   AST  --  20  --  23

## 2020-07-15 NOTE — PLAN OF CARE
A&O x4. VSS, on 2L NC. Up w/ A-1. Tele: a fib CVR w/ paced.  PPM placed. Site WDL. BL groin sites WDL. Neuros unchanged. Restart pradaxa tomorrow. Plan for possible discharge tomorrow after x ray and PPM interrogation.  Continue to monitor.

## 2020-07-15 NOTE — PRE-PROCEDURE
GENERAL PRE-PROCEDURE:   Procedure:  PPM implant  Date/Time:  7/15/2020 2:13 PM    Written consent obtained?: Yes    Risks and benefits: Risks, benefits and alternatives were discussed    Consent given by:  Patient  Patient states understanding of procedure being performed: Yes    Patient's understanding of procedure matches consent: Yes    Procedure consent matches procedure scheduled: Yes    Expected level of sedation:  Moderate  Appropriately NPO:  Yes  ASA Class:  Class 3- Severe systemic disease, definite functional limitations  Mallampati  :  Grade 2- soft palate, base of uvula, tonsillar pillars, and portion of posterior pharyngeal wall visible  Lungs:  Lungs clear with good breath sounds bilaterally  Heart:  Normal heart sounds and rate  History & Physical reviewed:  History and physical reviewed and no updates needed  Statement of review:  I have reviewed the lab findings, diagnostic data, medications, and the plan for sedation

## 2020-07-15 NOTE — PLAN OF CARE
CR: Pt not yet appropriate for CR services. Per RN, pt to likely have PPM placed today, recommended holding until 7/16 as a time has not yet been set for intervention.

## 2020-07-15 NOTE — OP NOTE
Procedure Date: 07/14/2020      PREOPERATIVE DIAGNOSES:     1.  Symptomatic severe aortic valve stenosis.   2.  Hypertension.   3.  Hyperlipidemia.   4.  Asthma.   5.  Chronic kidney disease.   6.  Atrial fibrillation.   7.  Recent upper gastrointestinal bleed.   8.  Coronary artery disease with prior coronary artery bypass grafting.      POSTOPERATIVE DIAGNOSES:     1.  Symptomatic severe aortic valve stenosis.   2.  Hypertension.   3.  Hyperlipidemia.   4.  Asthma.   5.  Chronic kidney disease.   6.  Atrial fibrillation.   7.  Recent upper gastrointestinal bleed.   8.  Coronary artery disease with prior coronary artery bypass grafting.      OPERATION:  Right transfemoral implantation of a 29-mm Medtronic Evolut bioprosthesis.      INTERVENTIONAL CARDIOLOGIST:     1.  Josh Monet MD.   2.  Kaylin Henderson MD.       STRUCTURAL HEART FELLOW:  Jeb Araujo MD       CARDIAC SURGEON:  Jovanni Cardoso MD.      ANESTHESIA:  General anesthesia with local.      INDICATIONS FOR PROCEDURE:  The patient is an 85-year-old gentleman initially evaluated in May of this year for aortic stenosis.  He underwent TAVR workup at that time, but also had additional findings on his CT scan concerning for potential malignancy.  He was also found to have left atrial appendage thrombus and ultimately it was felt to need medical optimization before proceeding.  Unfortunately, in the meantime, he has had multiple hospital admissions, most recently with hospital-acquired pneumonia.  The additional medical histories are noted.  Given his age and comorbid conditions, he is deemed to be high risk for surgical valve replacement and is referred for TAVR.  Following discussion of risks and benefits, he elected to proceed.      DESCRIPTION OF PROCEDURE:  The patient was brought to the Cath Lab and placed supine on the table.  Appropriate monitoring lines were placed and general anesthesia was established.  The patient was then sterilely prepped  and draped in the usual fashion and timeout performed to confirm patient identity, procedure to be performed and administration of preoperative antibiotics.      A combination of ultrasound and fluoroscopy was utilized to obtain bilateral common femoral arterial access and left common femoral venous access.  This was exchanged for sheath access.  A temporary pacemaker was advanced into the right ventricle.  A pigtail catheter was advanced from the left-sided arterial access in the aortic root.  A large bore sheath was then placed on the right after serially dilating over a stiff wire.  The patient was then systemically heparinized.      The native valve was crossed with an AL1 catheter and a straight wire.  This was exchanged for a J wire followed by a pigtail followed by a Safari.  The valve delivery system was then advanced over the Safari wire into position within the aortic root.  This was partially deployed with rapid pacing.  However, it appeared that the depth was too ventricular and it was recaptured.  During this time, it did appear that the patient developed heart block, and he was back-up paced to maintain stability.  His hemodynamics tolerated this well.      The valve was again partially deployed under rapid pacing with better positioning.  We were satisfied with the depth.  The valve was fully released.  A combination of root aortography and echocardiography revealed good valve position, but with at least moderate paravalvular leak.  Balloon valvuloplasty was determined to be the next step, and so the valve delivery system was withdrawn.  The balloon was advanced over the Safari wire within the aortic root and 2 separate balloon post-dilations were undertaken under rapid pacing with clear expansion of the valve.      The patient's hemodynamics recovered promptly after each of these and further evaluation revealed resolution of the perivalvular insufficiency.      The balloon was fully withdrawn, and  the Safari wire recaptured.  The left-sided pigtail was withdrawn to the level of iliac bifurcation and protamine was administered to reverse the patient's heparinization.  The large bore sheath was then removed over an access wire and the existing Perclose were tightened.  There appeared to be appropriate hemostasis and the wire was removed.  Manual pressure was maintained at that site.      Completion iliofemoral angiography revealed no extravasation or stenosis.  The left-sided arterial access was managed with an Angio-Seal.  Given the patient's new heart block, a temporary screw-in pacemaker was inserted to replace the existing pacemaker pending evaluation by Electrophysiology for placement of a permanent pacemaker.      At the end of the procedure, all counts were correct.  The patient was taken to Recovery in stable condition.         ROGERS ALBARRAN MD             D: 2020   T: 2020   MT:       Name:     SRIKANTH OBANDO   MRN:      [id number]        Account:        [id number]   :      1934           Procedure Date: 2020      Document: [id number]

## 2020-07-16 ENCOUNTER — APPOINTMENT (OUTPATIENT)
Dept: PHYSICAL THERAPY | Facility: CLINIC | Age: 85
DRG: 267 | End: 2020-07-16
Payer: COMMERCIAL

## 2020-07-16 ENCOUNTER — APPOINTMENT (OUTPATIENT)
Dept: GENERAL RADIOLOGY | Facility: CLINIC | Age: 85
DRG: 267 | End: 2020-07-16
Attending: INTERNAL MEDICINE
Payer: COMMERCIAL

## 2020-07-16 ENCOUNTER — TELEPHONE (OUTPATIENT)
Dept: CARDIOLOGY | Facility: CLINIC | Age: 85
End: 2020-07-16

## 2020-07-16 VITALS
SYSTOLIC BLOOD PRESSURE: 179 MMHG | WEIGHT: 156.1 LBS | TEMPERATURE: 98.8 F | HEART RATE: 74 BPM | OXYGEN SATURATION: 97 % | RESPIRATION RATE: 17 BRPM | HEIGHT: 68 IN | DIASTOLIC BLOOD PRESSURE: 78 MMHG | BODY MASS INDEX: 23.66 KG/M2

## 2020-07-16 DIAGNOSIS — Z95.0 CARDIAC PACEMAKER IN SITU: Primary | ICD-10-CM

## 2020-07-16 DIAGNOSIS — I49.5 SSS (SICK SINUS SYNDROME) (H): ICD-10-CM

## 2020-07-16 LAB
ANION GAP SERPL CALCULATED.3IONS-SCNC: 7 MMOL/L (ref 3–14)
BUN SERPL-MCNC: 23 MG/DL (ref 7–30)
CALCIUM SERPL-MCNC: 9.1 MG/DL (ref 8.5–10.1)
CHLORIDE SERPL-SCNC: 107 MMOL/L (ref 94–109)
CO2 SERPL-SCNC: 26 MMOL/L (ref 20–32)
CREAT SERPL-MCNC: 1.37 MG/DL (ref 0.66–1.25)
ERYTHROCYTE [DISTWIDTH] IN BLOOD BY AUTOMATED COUNT: 18.8 % (ref 10–15)
GFR SERPL CREATININE-BSD FRML MDRD: 45 ML/MIN/{1.73_M2}
GLUCOSE SERPL-MCNC: 105 MG/DL (ref 70–99)
HCT VFR BLD AUTO: 31 % (ref 40–53)
HGB BLD-MCNC: 9.6 G/DL (ref 13.3–17.7)
INTERPRETATION ECG - MUSE: NORMAL
MAGNESIUM SERPL-MCNC: 2.1 MG/DL (ref 1.6–2.3)
MCH RBC QN AUTO: 22.5 PG (ref 26.5–33)
MCHC RBC AUTO-ENTMCNC: 31 G/DL (ref 31.5–36.5)
MCV RBC AUTO: 73 FL (ref 78–100)
PHOSPHATE SERPL-MCNC: 3.2 MG/DL (ref 2.5–4.5)
PLATELET # BLD AUTO: 119 10E9/L (ref 150–450)
POTASSIUM SERPL-SCNC: 4.4 MMOL/L (ref 3.4–5.3)
RBC # BLD AUTO: 4.27 10E12/L (ref 4.4–5.9)
SODIUM SERPL-SCNC: 140 MMOL/L (ref 133–144)
WBC # BLD AUTO: 4.8 10E9/L (ref 4–11)

## 2020-07-16 PROCEDURE — 93010 ELECTROCARDIOGRAM REPORT: CPT | Performed by: INTERNAL MEDICINE

## 2020-07-16 PROCEDURE — C9113 INJ PANTOPRAZOLE SODIUM, VIA: HCPCS | Performed by: PHYSICIAN ASSISTANT

## 2020-07-16 PROCEDURE — 25000132 ZZH RX MED GY IP 250 OP 250 PS 637: Performed by: HOSPITALIST

## 2020-07-16 PROCEDURE — 80048 BASIC METABOLIC PNL TOTAL CA: CPT | Performed by: STUDENT IN AN ORGANIZED HEALTH CARE EDUCATION/TRAINING PROGRAM

## 2020-07-16 PROCEDURE — 25000128 H RX IP 250 OP 636: Performed by: INTERNAL MEDICINE

## 2020-07-16 PROCEDURE — 99232 SBSQ HOSP IP/OBS MODERATE 35: CPT | Mod: 24 | Performed by: INTERNAL MEDICINE

## 2020-07-16 PROCEDURE — 25000128 H RX IP 250 OP 636: Performed by: PHYSICIAN ASSISTANT

## 2020-07-16 PROCEDURE — 99239 HOSP IP/OBS DSCHRG MGMT >30: CPT | Performed by: INTERNAL MEDICINE

## 2020-07-16 PROCEDURE — 97530 THERAPEUTIC ACTIVITIES: CPT | Mod: GP

## 2020-07-16 PROCEDURE — 85027 COMPLETE CBC AUTOMATED: CPT | Performed by: STUDENT IN AN ORGANIZED HEALTH CARE EDUCATION/TRAINING PROGRAM

## 2020-07-16 PROCEDURE — 97161 PT EVAL LOW COMPLEX 20 MIN: CPT | Mod: GP

## 2020-07-16 PROCEDURE — 25000125 ZZHC RX 250: Performed by: PHYSICIAN ASSISTANT

## 2020-07-16 PROCEDURE — 25000132 ZZH RX MED GY IP 250 OP 250 PS 637: Performed by: NURSE PRACTITIONER

## 2020-07-16 PROCEDURE — 97110 THERAPEUTIC EXERCISES: CPT | Mod: GP

## 2020-07-16 PROCEDURE — 25000132 ZZH RX MED GY IP 250 OP 250 PS 637: Performed by: PHYSICIAN ASSISTANT

## 2020-07-16 PROCEDURE — 36415 COLL VENOUS BLD VENIPUNCTURE: CPT | Performed by: STUDENT IN AN ORGANIZED HEALTH CARE EDUCATION/TRAINING PROGRAM

## 2020-07-16 PROCEDURE — 94640 AIRWAY INHALATION TREATMENT: CPT

## 2020-07-16 PROCEDURE — 84100 ASSAY OF PHOSPHORUS: CPT | Performed by: STUDENT IN AN ORGANIZED HEALTH CARE EDUCATION/TRAINING PROGRAM

## 2020-07-16 PROCEDURE — 93005 ELECTROCARDIOGRAM TRACING: CPT

## 2020-07-16 PROCEDURE — 40000275 ZZH STATISTIC RCP TIME EA 10 MIN

## 2020-07-16 PROCEDURE — 71046 X-RAY EXAM CHEST 2 VIEWS: CPT

## 2020-07-16 PROCEDURE — 83735 ASSAY OF MAGNESIUM: CPT | Performed by: STUDENT IN AN ORGANIZED HEALTH CARE EDUCATION/TRAINING PROGRAM

## 2020-07-16 RX ORDER — NITROGLYCERIN 0.4 MG/1
TABLET SUBLINGUAL
Qty: 12 TABLET | Refills: 0 | Status: SHIPPED | OUTPATIENT
Start: 2020-07-16 | End: 2023-05-30

## 2020-07-16 RX ORDER — DABIGATRAN ETEXILATE 150 MG/1
150 CAPSULE ORAL 2 TIMES DAILY
Status: DISCONTINUED | OUTPATIENT
Start: 2020-07-16 | End: 2020-07-16 | Stop reason: HOSPADM

## 2020-07-16 RX ORDER — METOPROLOL TARTRATE 25 MG/1
25 TABLET, FILM COATED ORAL 2 TIMES DAILY
Status: DISCONTINUED | OUTPATIENT
Start: 2020-07-16 | End: 2020-07-16 | Stop reason: HOSPADM

## 2020-07-16 RX ORDER — METOPROLOL TARTRATE 25 MG/1
25 TABLET, FILM COATED ORAL 2 TIMES DAILY
Qty: 60 TABLET | Refills: 0 | Status: ON HOLD | OUTPATIENT
Start: 2020-07-16 | End: 2020-10-27

## 2020-07-16 RX ORDER — LISINOPRIL 10 MG/1
10 TABLET ORAL DAILY
Status: DISCONTINUED | OUTPATIENT
Start: 2020-07-16 | End: 2020-07-16 | Stop reason: HOSPADM

## 2020-07-16 RX ORDER — ASPIRIN 81 MG/1
81 TABLET, CHEWABLE ORAL DAILY
Status: DISCONTINUED | OUTPATIENT
Start: 2020-07-17 | End: 2020-07-16 | Stop reason: HOSPADM

## 2020-07-16 RX ORDER — LISINOPRIL 10 MG/1
10 TABLET ORAL DAILY
Qty: 30 TABLET | Refills: 0 | Status: SHIPPED | OUTPATIENT
Start: 2020-07-16 | End: 2020-07-22

## 2020-07-16 RX ADMIN — DABIGATRAN ETEXILATE MESYLATE 150 MG: 150 CAPSULE ORAL at 10:38

## 2020-07-16 RX ADMIN — Medication 1 TABLET: at 09:46

## 2020-07-16 RX ADMIN — METOPROLOL TARTRATE 25 MG: 25 TABLET, FILM COATED ORAL at 09:46

## 2020-07-16 RX ADMIN — ACETAMINOPHEN 650 MG: 325 TABLET, FILM COATED ORAL at 04:33

## 2020-07-16 RX ADMIN — Medication 1 MG: at 00:06

## 2020-07-16 RX ADMIN — FERROUS SULFATE TAB 325 MG (65 MG ELEMENTAL FE) 325 MG: 325 (65 FE) TAB at 09:46

## 2020-07-16 RX ADMIN — MONTELUKAST 10 MG: 10 TABLET, FILM COATED ORAL at 09:46

## 2020-07-16 RX ADMIN — LISINOPRIL 10 MG: 10 TABLET ORAL at 09:46

## 2020-07-16 RX ADMIN — BUDESONIDE 0.5 MG: 0.5 INHALANT RESPIRATORY (INHALATION) at 07:11

## 2020-07-16 RX ADMIN — IPRATROPIUM BROMIDE AND ALBUTEROL SULFATE 3 ML: .5; 3 SOLUTION RESPIRATORY (INHALATION) at 07:11

## 2020-07-16 RX ADMIN — CEFAZOLIN 1 G: 330 INJECTION, POWDER, FOR SOLUTION INTRAMUSCULAR; INTRAVENOUS at 05:31

## 2020-07-16 RX ADMIN — PANTOPRAZOLE SODIUM 40 MG: 40 INJECTION, POWDER, FOR SOLUTION INTRAVENOUS at 09:46

## 2020-07-16 ASSESSMENT — MIFFLIN-ST. JEOR: SCORE: 1359.62

## 2020-07-16 NOTE — PROGRESS NOTES
[facility]    EP Progress Note    Date of Service (when I saw the patient): 07/16/2020     Assessment & Plan   Shiraz Ingram is a 85 year old male with h/o HTN, dyslipidemia, pAFib, CKD, CAD s/p CABG 2002 and severe AoS. Underwent TAVR 7/14 and Dr. Sauer consulted 7/15 due to AV Block    1. High degree Heart Block  -   - Underlying conduction dz with RBBB and LAFB pre-TAVR and developed CHB during TAVR 7/14    - Underwent single chamber Medtronic PPM 7/15/2020  - Device interrogation today with 2.9%  and normal function  - CXR today without PTX     - Device teaching will be done via phone after discharge    - Tele thus far  with underlying AFib     PLAN:   * Device follow-up will be set up post discharge   * EP will sign off     2. Persistent AFib dx'd 1/2020 -  - MAYA thrombus noted on TAVR CT 2/2020 and switched from Xarelto to Pradaxa (with initial plans for warfarin aborted due to COVID19 restrictions and need for frequent INRs)  - Last dose heparin given 7/15 - held post PPM      PLAN:   * Restart Pradaxa 150 mg BID - I ordered    3. Aortic Stenosis -   - Echo 7/14 (limited) with EF 60-65% and bioprosthetic AoV 29 mm Medtronic. Valve well seated with normal gradient 5 mmHg with just trace posterior amanda-prosthetic AI  - CXR 7/16 with improved vascular congestion c/w 7/3. Small bilateral effusion  - Weight down 0.6 kg c/w admit     PLAN:   * Continue routine TAVR follow-up      4. CAD -   - s/p 4v urgent CABG 2002 (LIMA/LAD, VG/OM, Y-graft to R PDA/R PL))  - EF wnl   - Cath 4/2020 with moderate touchdown lesion of the R PDA branch of [id number] Y-graft. Patent LIMA/LAD, VG/OM and RPL Y graft branch.  - PTA on ASA, lisinopril 20, simvastatin 40. Was possibly on metoprolol tartrate just daily  - Here, on lisinopril 10, simvastatin 40     PLAN:   * Continue routine gen follow-up and ACE, statin   * ASA per General Cardiology   * Restart metoprolol tartrate but at 25 mg BID (has PPM in  place)    Criss Maurice PA-C    Interval History   Seen today with   Doing well. No pain    Physical Exam   Temp: 98.8  F (37.1  C) Temp src: Oral BP: (!) 150/66 Pulse: 74 Heart Rate: 75 Resp: 17 SpO2: 97 % O2 Device: Nasal cannula Oxygen Delivery: 1 LPM  Vitals:    07/14/20 1121 07/15/20 0531 07/16/20 0621   Weight: 71.4 kg (157 lb 5 oz) 77.9 kg (171 lb 11.2 oz) 70.8 kg (156 lb 1.6 oz)     Vital Signs with Ranges  Temp:  [98.7  F (37.1  C)-99.4  F (37.4  C)] 98.8  F (37.1  C)  Pulse:  [63-87] 74  Heart Rate:  [70-92] 75  Resp:  [11-28] 17  BP: (118-167)/(58-85) 150/66  SpO2:  [87 %-100 %] 97 %  I/O last 3 completed shifts:  In: 120 [P.O.:120]  Out: 975 [Urine:975]    Telemetry: AFib 80s    Constitutional: Awake, alert  Respiratory: Decreased breath sounds in bilateral bases  Cardiovascular: Irregular. Controlled HR. PPM site c/d without hematoma or severe ecchymosis.   Musculoskeletal: R>L LE edema, mild    Medications     - MEDICATION INSTRUCTIONS -         budesonide  0.5 mg Nebulization BID     calcium carbonate 600 mg-vitamin D 400 units  1 tablet Oral Daily     ceFAZolin  1 g Intravenous Q8H     ferrous sulfate  325 mg Oral Daily     ipratropium - albuterol 0.5 mg/2.5 mg/3 mL  3 mL Nebulization 4x daily     montelukast  10 mg Oral Daily     multivitamin, therapeutic  1 tablet Oral QPM     pantoprazole (PROTONIX) IV  40 mg Intravenous BID     simvastatin  40 mg Oral At Bedtime       Data   I personally reviewed the EKG tracing showing AFib @ 79 bpm and bifascicular block. Occasional PVC  Results for orders placed or performed during the hospital encounter of 07/14/20 (from the past 24 hour(s))   Echo Limited    Narrative    [id number]  [id number]  [id number]  [id number]^MOR^ANAS                                                                          Version 2        [facility]  Echocardiography Laboratory  [address] [place], [place] [id number]        Name: SRIKANTH OBANDO  R  MRN: [id number]  : 1934  Study Date: 07/15/2020 08:51 AM  Age: 85 yrs  Gender: Male  Patient Location: [facility]  Reason For Study: Aortic Valve Replacement  Ordering Physician: ROEL JUARES  Referring Physician: Dany Rodriguez  Performed By: Glenn Costa RDCS     BSA: 1.8 m2  Height: 68 in  Weight: 157 lb  HR: 79  BP: 140/70 mmHg  _____________________________________________________________________________  __        Procedure  Limited Portable Echo Adult.  _____________________________________________________________________________  __        Interpretation Summary     The visual ejection fraction is estimated at 60-65%.  No regional wall motion abnormalities noted.  There is a bioprosthetic aortic valve (29-mm Medtronic).  The prosthetic aortic valve is well-seated.  The prosthetic aortic valve appears to open well.  The gradient is normal for this prosthetic aortic valve (5mmHg).  Trace posterior amanda-prosthetic aortic valve regurgitation.  _____________________________________________________________________________  __        Left Ventricle  The visual ejection fraction is estimated at 60-65%. No regional wall motion  abnormalities noted.     Right Ventricle  There is a catheter/pacemaker lead seen in the right ventricle.     Atria  The left atrium is severely dilated. The right atrium is severely dilated.     Mitral Valve  There is mild to moderate mitral annular calcification. There is trace mitral  regurgitation.        Tricuspid Valve  The right ventricular systolic pressure is approximated at 40.5 mmHg plus the  right atrial pressure. There is trace tricuspid regurgitation. IVC diameter  and respiratory changes fall into an intermediate range suggesting an RA  pressure of 8 mmHg.     Aortic Valve  Trace posterior amanda-prosthetic aortic valve regurgitation. The mean AoV  pressure gradient is 5.0 mmHg. The prosthetic aortic valve is well-seated. The  prosthetic aortic valve appears to open  well. The gradient is normal for this  prosthetic aortic valve. There is a bioprosthetic aortic valve.     Pulmonic Valve  There is no pulmonic valvular regurgitation.  _____________________________________________________________________________  __     MMode/2D Measurements & Calculations  LVOT diam: 2.0 cm  LVOT area: 3.3 cm2        Doppler Measurements & Calculations  Ao V2 max: 183.0 cm/sec  Ao max P.4 mmHg  Ao V2 mean: 100.6 cm/sec  Ao mean P.0 mmHg  Ao V2 VTI: 28.4 cm  LAUREEN(I,D): 2.6 cm2  LAUREEN(V,D): 2.5 cm2  LV V1 max P.6 mmHg  LV V1 max: 137.9 cm/sec  LV V1 VTI: 22.4 cm  SV(LVOT): 73.0 ml  SI(LVOT): 39.6 ml/m2  TR max vishnu: 318.0 cm/sec  TR max P.5 mmHg  AV Vishnu Ratio (DI): 0.75  LAUREEN Index (cm2/m2): 1.4              _____________________________________________________________________________  __        Report approved by: Billie Esteves 07/15/2020 12:06 PM      Glucose by meter   Result Value Ref Range    Glucose 88 70 - 99 mg/dL   EP Device    Narrative    PROCEDURES PERFORMED:  1. Conscious sedation.  2. Cardiac fluoroscopy.  3. Single-chamber permanent pacemaker implantation.  4. Temporary pacing wire extraction.    INDICATION: Symptomatic bradycardia secondary to complete AV block.    HISTORY OF PRESENT ILLNESS: 85-year old gentleman with a history of   hypertension, hyperlipidemia, permanent atrial fibrillation, chronic   kidney disease, coronary disease (CABG ) and severe aortic stenosis,   who underwent TAVR yesterday and procedure was complicated by heart block.    Temporary pacing wire was placed and he was referred for permanent   pacemaker implantation and temporary pacing wire removal.    Risks and benefits of the procedure were reviewed including (but not   limited to) arrhythmias, pain, infection, bleeding, skin damage from   ionizing radiation, kidney damage or allergic reaction to contrast dye,   injury to neighboring vascular structures, need for emergent    cardiopulmonary resuscitation, heart attack, stroke, death. Alternatives   were discussed including doing nothing. All questions were answered to the   patient's satisfaction. Informed consent was signed and the patient wished   to proceed.    Timeout was performed in the presence of the staff members listed above.    FLUOROSCOPY DATA:  Fluoro time:  1 minute and 35 seconds.  Radiation dose (AK): 12 mGy.  Dose-area product (DAP): 1,125 mGy.cm2.    PROCEDURE DESCRIPTION: After written, informed consent was obtained, the   patient was brought to the electrophysiology Laboratory. An intravenous   infusion of prophylactic antibiotic was begun. The chest was sterilely   prepped from the level of the iliac crest to the level of the chin with an   antibiotic soap and disinfectant, and draped appropriately. Following   infiltration with 1% lidocaine, an incision was made parallel to and 1   centimeter beneath the clavicle and extended across the deltopectoral   groove. Using blunt dissection, the incision was extended down to the   anterior pectoralis fascia, and a pocket was developed parallel to the   fascia and was extended inferiorly as well as a small distance superiorly.    A pocket puncture via fluoroscopy guidance and modified Seldinger   technique were used to attain access into the left subclavian vein. A 6Fr   sheath was inserted. An active fixation pacing lead was advanced to the RV   apex. Stimulation and sensing thresholds were found to be satisfactory.    The lead was actively fixated, and no diaphragmatic stimulation was   noticed with high output pacing. The lead was sutured to the underlying   pectoral muscle with interrupted 0 silk sutures over a plastic collar.    After irrigation with antibiotic solution, the pocket was inspected and no   bleeding was seen. The generator was connected to the lead and inserted   into the pocket. The wound was closed with 3 layers of subcutaneous   sutures.    After  permanent pacemaker implantation, the temporary pacing wire was   easily removed under fluoroscopy.    DEVICE GENERATOR INFORMATION:  Medtronic, Model W1SR01, serial number [id number].    LEAD INFORMATION:  Right ventricle lead: Medtronic, model 5076/ 52, serial number [id number].    MEASURED DATA:  Right ventricle lead: sensing- 6.8 mV, threshold 0.4V@0.4 ms, impedance   494 Ohms.    IMPRESSION:  1. Successful implantation of a permanent pacemaker in the left   infraclavicular region above the pectoral fascia.   2. Bradycardia pacing set to VVI at 50 bpm.    RECOMMENDATIONS:  1. Avoid vascular access through the left jugular and subclavian veins.  2. Keep wound dry, clean, and covered for 3 days.  3. PA and lateral CXR prior to discharge.  4. Left arm sling overnight, then off in am.  5. May restart all oral medications, including oral anticoagulants. Avoid   IV or SQ heparin for at least two weeks. If heparin must be used, please   contact procedure attending to discuss.  6. Wound care as follows:  a. Do not get incision wet for 3 days.  b. Leave steri strips in place allowing them to come off naturally. If   they do not come off in 2 weeks you may remove them.  c. Check your incision and call our office if you notice one of the   following: redness, drainage (pus or blood), swelling, warmth or if you   develop a fever.    If you have questions regarding your wound care, please contact our   office.    COMPLICATIONS:  No immediate complications were apparent.    Tommie Sauer MD       CBC with platelets   Result Value Ref Range    WBC 4.8 4.0 - 11.0 10e9/L    RBC Count 4.27 (L) 4.4 - 5.9 10e12/L    Hemoglobin 9.6 (L) 13.3 - 17.7 g/dL    Hematocrit 31.0 (L) 40.0 - 53.0 %    MCV 73 (L) 78 - 100 fl    MCH 22.5 (L) 26.5 - 33.0 pg    MCHC 31.0 (L) 31.5 - 36.5 g/dL    RDW 18.8 (H) 10.0 - 15.0 %    Platelet Count 119 (L) 150 - 450 10e9/L   Basic metabolic panel   Result Value Ref Range    Sodium 140 133 - 144 mmol/L     Potassium 4.4 3.4 - 5.3 mmol/L    Chloride 107 94 - 109 mmol/L    Carbon Dioxide 26 20 - 32 mmol/L    Anion Gap 7 3 - 14 mmol/L    Glucose 105 (H) 70 - 99 mg/dL    Urea Nitrogen 23 7 - 30 mg/dL    Creatinine 1.37 (H) 0.66 - 1.25 mg/dL    GFR Estimate 45 (L) >60 mL/min/[1.73_m2]    GFR Estimate If Black 53 (L) >60 mL/min/[1.73_m2]    Calcium 9.1 8.5 - 10.1 mg/dL   Magnesium   Result Value Ref Range    Magnesium 2.1 1.6 - 2.3 mg/dL   Phosphorus   Result Value Ref Range    Phosphorus 3.2 2.5 - 4.5 mg/dL

## 2020-07-16 NOTE — PROGRESS NOTES
[facility]- TAVR Progress Note     Date of Service: 07/15/20     HPI  Shiraz Ingram is a 85 year old male with a past medical history significant for aortic stenosis, CAD s/p CABG ( LIMA to LAD, vein graft to OM and RCA, atrial fibrillation w/ MAYA thrombus on chronic anticoagulation, hearing loss, chromic anemia, chronic diastolic heart failure, hypertension, asthma, chronic kidney disease stage III, recent GI bleed requiring transfusion was admitted on 7/14/2020 for elective Transfemoral Transcatheter Aortic Valve Replacement (TAVR).     Assessment and Plan  1) S/p TAVR using a 29 mm Medtronic Evolute Pro Plus Valve.     - Echocardiogram showed normal LVEF at 60-65%, trace PVL, mean gradient 5 mmHg   - Access Site : free of hematoma or bruit, positive peripheral pulses   - Neuro check : intact    - Antiplatelet therapy : Pradaxa + ASA    - CBC hgb 9.6, wbc 4.8, plt 119    - BMP : creatinine 1.37, GFR 45   - Temp IJ pacer     2) Symptomatic bradycardia secondary to complete heart block status post PPM implantation 7/15/2020 by Dr. Sauer.  EP following.     3) Coronary artery disease.  Remote history of CABG. Recent coronary angiogram demonstrated severe three-vessel coronary disease, moderate to severe lesion of the right PDA branch of the SVG- right PDA- right PL Y graft, widely patent LIMA to the LAD, SVG to the OM.    Aspirin and statin therapy.    4) Atrial fibrillation with known MAYA thrombus.  Currently on heparin infusion.  Should patient get pacemaker placement today, heparin will need to be stopped prior to implantation.  Prior to admission, he takes Pradaxa.      5) Hypertension.  Uncontrolled at 150/66.     6) Hyperlipidemia.  LDL 54.  Simvastatin 20 mg daily    7) CKD. Baseline creatinine 1.4.  Creatinine today 1.37, GFR 45    Plan  1. Resume Lisinopril 10 mg daily  2. Resume Pradaxa and Aspirin when ok by EP  3. Out of bed with cardiac rehab  4. Groin precautions  discussed   5. Possibly home later today    Interval History: Resting in bed. No complaints overnigth  Physical Exam   Temp: 98.8  F (37.1  C) Temp src: Oral BP: (!) 150/66 Pulse: 74 Heart Rate: 75 Resp: 17 SpO2: 97 % O2 Device: Nasal cannula Oxygen Delivery: 1 LPM  Vitals:    07/14/20 1121 07/15/20 0531 07/16/20 0621   Weight: 71.4 kg (157 lb 5 oz) 77.9 kg (171 lb 11.2 oz) 70.8 kg (156 lb 1.6 oz)     Vital Signs with Ranges  Temp:  [98.7  F (37.1  C)-99.4  F (37.4  C)] 98.8  F (37.1  C)  Pulse:  [63-87] 74  Heart Rate:  [70-92] 75  Resp:  [11-28] 17  BP: (118-167)/(58-85) 150/66  SpO2:  [87 %-100 %] 97 %  I/O last 3 completed shifts:  In: 120 [P.O.:120]  Out: 975 [Urine:975]    Constitutional: awake, alert, cooperative, no apparent distress, and appears stated age  Respiratory: No increased work of breathing, good air exchange, clear to auscultation bilaterally, no crackles or wheezing  Cardiovascular: irregularly irregular rhythm, normal S1 and S2 and grade 1-2/6 CATALINA  GI: non-tender  Skin: no redness, warmth, or swelling, no bruit or hematoma in bilateral groins, positive pulses, left should dressing intact  Musculoskeletal: no lower extremity pitting edema present  Neurologic: Mental Status Exam:  Level of Alertness:   awake  Neuropsychiatric: Level of consciousness: alert / normal  Affect: normal    Medications     - MEDICATION INSTRUCTIONS -         budesonide  0.5 mg Nebulization BID     calcium carbonate 600 mg-vitamin D 400 units  1 tablet Oral Daily     ceFAZolin  1 g Intravenous Q8H     ferrous sulfate  325 mg Oral Daily     ipratropium - albuterol 0.5 mg/2.5 mg/3 mL  3 mL Nebulization 4x daily     montelukast  10 mg Oral Daily     multivitamin, therapeutic  1 tablet Oral QPM     pantoprazole (PROTONIX) IV  40 mg Intravenous BID     simvastatin  40 mg Oral At Bedtime       Data   Recent Labs   Lab 07/16/20 0527 07/15/20  0527 07/14/20  1650  07/13/20  1420   WBC 4.8 6.3 3.4*  --  3.6*   HGB 9.6* 10.4*  9.7*  --  11.5*   MCV 73* 74* 72*  --  72*   * 156 131*  --  168   INR  --   --   --   --  1.21*    143 141  --  142   POTASSIUM 4.4 4.8 4.1   < > 4.2   CHLORIDE 107 110* 110*  --  109   CO2 26 26 27  --  26   BUN 23 23 22  --  26   CR 1.37* 1.41* 1.36*  --  1.40*   ANIONGAP 7 7 4  --  7   AYALA 9.1 9.0 8.4*  --  9.4   * 95 93  --  98   ALBUMIN  --   --  3.0*  --  3.6   PROTTOTAL  --   --  6.2*  --  7.8   BILITOTAL  --   --  0.5  --  0.6   ALKPHOS  --   --  60  --  82   ALT  --   --  21  --  28   AST  --   --  20  --  23    < > = values in this interval not displayed.           Daksha Smith, APRN, CNP  Pager [phone]  7/15/2020

## 2020-07-16 NOTE — TELEPHONE ENCOUNTER
Post device implant discharge phone call.    Reviewed the following:  No raising arm above shoulder on the side of implant for 3 weeks  Remove outer dressing 3 days after implant. May shower after outer dressing removed. Leave steri-strips in place, will be removed at 1 week device check  Limit driving for: 1 week (PPM)  Watch for redness, drainage, warmth, or fever. Call device clinic if any signs of infection.     1 week device check scheduled: 07/22/2020.    Pt states understanding of all instructions.

## 2020-07-16 NOTE — PLAN OF CARE
VSS. HR in 70-80's over night. Tele A fib, CVR with BBB. Dressing to pacemaker CDI. Site soar, however patient declined ice or pain med. Did take Tylenol this AM for soar throat. Dressing to R neck, L groin and R groin CDI. Up with one assist. High fall risk. Voiding small amounts. Patient is deaf, was able to communicate by lip  reading and writing. Antiipate discharge today after EKG, chest x ray and pacemaker interrogation.

## 2020-07-16 NOTE — PLAN OF CARE
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 At baseline, pt is independent without use of assistive device, lives with sons, main level set up with 3 stairs to enter home. PT/CR orders received, chart reviewed, evaluation completed and treatment initiated. Pt is independent with bed mobility and transfers, demonstrates good recall of mobility precautions. Pt able to ambulate x8 minutes in halls with good pace, one seated rest break. Pt up/down 5 stairs with rail and SBA. Pt rates activity as 3/10 on OMNI effort scale, see flow sheet for vital signs.  Barriers to return to prior living situation: None from a mobility standpoint  Recommendations for discharge: Home with outpatient cardiac rehab  Rationale for recommendations: Pt is able to complete all mobility necessary for discharge to home. Recommend outpatient cardiac rehab to further progress aerobic activity tolerance in monitored setting and for education to promote optimal heart health.       Entered by: Joan Mejía 07/16/2020 9:33 AM      Cardiac Rehab Discharge Summary    Reason for therapy discharge:    Discharged to home with outpatient therapy.    Progress towards therapy goal(s). See goals on Care Plan in [facility] electronic health record for goal details.  Goals not met.  Barriers to achieving goals:   discharge from facility and discharge on same date as initial evaluation.    Therapy recommendation(s):    Continued therapy is recommended.  Rationale/Recommendations:  outpatient cardiac rehab phase 2.

## 2020-07-16 NOTE — PROGRESS NOTES
07/16/20 0926   Quick Adds   Type of Visit Initial PT Evaluation   Living Environment   Lives With child(perla), adult   Living Arrangements house   Home Accessibility stairs to enter home   Number of Stairs, Main Entrance 3   Stair Railings, Main Entrance railings safe and in good condition   Transportation Anticipated family or friend will provide   Living Environment Comment Has needs met on main level, sons live in home and assist with laundry which is downstairs   Self-Care   Usual Activity Tolerance moderate   Current Activity Tolerance fair   Regular Exercise Yes   Activity/Exercise Type walking   Exercise Amount/Frequency daily   Equipment Currently Used at Home none   Activity/Exercise/Self-Care Comment Pt reports he walks his dogs 3 times a day, was able to walk about 1/4 block before admission to hospital, has had a decrease in activity tolerance lately.   Functional Level Prior   Ambulation 0-->independent   Transferring 0-->independent   Fall history within last six months no   Prior Functional Level Comment Independent without use of assistive device   General Information   Onset of Illness/Injury or Date of Surgery - Date 07/14/20   Referring Physician Jeb Araujo MD   Patient/Family Goals Statement To go home   Pertinent History of Current Problem (include personal factors and/or comorbidities that impact the POC) Pt is 85 year old male adm on 7/14/2020 for TAVR, post-procedure course complicated by heart block, had PPM placed 7/15/2020.   Precautions/Limitations no known precautions/limitations   Cognitive Status Examination   Orientation orientation to person, place and time   Level of Consciousness alert   Pain Assessment   Patient Currently in Pain No   Posture    Posture Forward head position   Range of Motion (ROM)   ROM Comment B LE ROM WFL   Strength   Strength Comments B LE strength WFL   Bed Mobility   Bed Mobility Comments Independent   Transfer Skills   Transfer Comments Independent  "  Gait   Gait Comments Independent   Balance   Balance Comments No LOB with functional tasks   General Therapy Interventions   Planned Therapy Interventions risk factor education;home program guidelines;progressive activity/exercise   Clinical Impression   Criteria for Skilled Therapeutic Intervention yes, treatment indicated   PT Diagnosis Impaired activity tolerance   Influenced by the following impairments Decreased endurance   Functional limitations due to impairments Decreased ability to participate in daily tasks   Clinical Presentation Stable/Uncomplicated   Clinical Presentation Rationale Current presentation, [facility]   Clinical Decision Making (Complexity) Low complexity   Therapy Frequency 2x/day   Predicted Duration of Therapy Intervention (days/wks) 2 days   Anticipated Discharge Disposition Home with Outpatient Therapy   Risk & Benefits of therapy have been explained Yes   Patient, Family & other staff in agreement with plan of care Yes   [facility] [employer] \"6 Clicks\"   2016, Trustees of [facility], under license to [employer].  All rights reserved.   6 Clicks Short Forms Basic Mobility Inpatient Short Form   [facility]  \"6 Clicks\" V.2 Basic Mobility Inpatient Short Form   1. Turning from your back to your side while in a flat bed without using bedrails? 4 - None   2. Moving from lying on your back to sitting on the side of a flat bed without using bedrails? 4 - None   3. Moving to and from a bed to a chair (including a wheelchair)? 4 - None   4. Standing up from a chair using your arms (e.g., wheelchair, or bedside chair)? 4 - None   5. To walk in hospital room? 4 - None   6. Climbing 3-5 steps with a railing? 4 - None   Basic Mobility Raw Score (Score out of 24.Lower scores equate to lower levels of function) 24   Total Evaluation Time   Total Evaluation Time (Minutes) 10     "

## 2020-07-16 NOTE — DISCHARGE SUMMARY
[facility]  Hospitalist Discharge Summary      Date of Admission:  7/14/2020  Date of Discharge:  7/16/2020  Discharging Provider: Bozena Groves MD, FACP    Discharge Diagnoses   Severe aortic stenosis s/p TAVR on July 14.  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  History of atrial fibrillation, on anticoagulation.  Left atrial appendage thrombus.  History of heart failure with preserved ejection fraction.  Coronary artery disease with history of remote CABG.  Essential hypertension.  Recent hospitalization for community-acquired pneumonia from July 3 to July 5.  Moderate persistent asthma.  History of GI bleed in May 2020.  Chronic kidney disease stage III.  Hearing loss, uses .      Follow-ups Needed After Discharge   Follow-up Appointments     Follow-up and recommended labs and tests      Follow up with primary care provider, Dany Rodriguez MD, within   7 days to evaluate treatment change and for hospital follow- up.  The   following labs/tests are recommended: BMP.  Follow up with cardiology as recommended             Unresulted Labs Ordered in the Past 30 Days of this Admission     No orders found from 6/14/2020 to 7/15/2020.          Discharge Disposition   Discharged to home  Condition at discharge: Stable    Hospital Course   Shiraz Ingram is a 85-year-old male with past medical history significant for essential hypertension, hyperlipidemia, paroxysmal atrial fibrillation, chronic kidney disease, coronary artery disease underwent CABG in 2010 and severe aortic stenosis who was admitted on July 14th to undergo TAVR, patient tolerated the procedure well but procedure was complicated by heart block.  Temporary pacing wire was placed and EP was consulted.    Patient is known to have conduction disease with underlying right bundle branch block and left anterior fascicular block and developed complete heart block during  TAVR procedure.  EKG later also showed atrial fibrillation with underlying right bundle branch block and left anterior fascicular block.  ALINE done showed normal LV function with ejection fraction estimated around 55 to 60%.  Patient was also very concerned about developing worsening AV node conduction as he does have a history of cardiac arrest in the past.  Patient underwent permanent pacemaker placement on July 15 and tolerated the procedure well.    After the surgery, patient has been evaluated by EP and cardiology, patient remains a stable this morning and is recommended to be started on all his home medications including Pradaxa and aspirin.  He has also been a started on low-dose metoprolol back to 25 mg p.o. twice daily, in the past he was used to be on 50 mg p.o. twice daily which was held secondary to bradycardia.  Due to patient high blood pressure, he was also started back on lisinopril 10 mg p.o. daily, in the past patient was taking lisinopril 20 mg p.o. daily.  He was given a prescription for both lisinopril and metoprolol for a month and I discussed with him that he will need further refills from his PCP or cardiology.    Patient will be continued on aspirin 81 mg p.o. daily, Pradaxa 150 mg p.o. twice daily, lisinopril 10 mg p.o. daily, metoprolol 25 mg p.o. twice daily along with simvastatin 40 mg p.o. daily on discharge and will have close follow-up with cardiology and PCP.  I have also given him a prescription for nitroglycerin on discharge.    He will also be continued on his PTA medications of albuterol inhaler, Pulmicort nebulizer, calcium along with vitamin D, ferrous sulfate, DuoNeb 4 times daily, Singulair 10 mg daily, meclizine 12.5 mg as needed, multivitamin, Protonix 40 mg due to his history of GI bleed.    Patient was seen and examined on the day of discharge , he is feeling well, does not have any complaints , I did review the discharge medications and instructions with the patient and  plan for him to follow up with the PCP after the hospitalization .patient was in agreement , he is discharged in stable condition back to his home.  He will be continued with cardiac rehab after the discharge.    Consultations This Hospital Stay   PHARMACY TO DOSE HEPARIN  ELECTROPHYSIOLOGY IP CONSULT  CARDIAC REHAB IP CONSULT  SOCIAL WORK IP CONSULT  HOSPITALIST IP CONSULT    Code Status   Full Code    Time Spent on this Encounter   I, Bozena Groves MD, personally saw the patient today and spent greater than 30 minutes discharging this patient.     Bozena Groves MD, FACP  [facility]  ______________________________________________________________________    Physical Exam   Vital Signs: Temp: 98.8  F (37.1  C) Temp src: Oral BP: (!) 179/78 Pulse: 74 Heart Rate: 87 Resp: 17 SpO2: 97 % O2 Device: Nasal cannula Oxygen Delivery: 1 LPM  Weight: 156 lbs 1.6 oz    Physical Exam  Vitals signs and nursing note reviewed.   Constitutional:       Appearance: He is well-developed.   HENT:      Head: Normocephalic and atraumatic.   Eyes:      Pupils: Pupils are equal, round, and reactive to light.   Neck:      Musculoskeletal: Normal range of motion and neck supple.      Thyroid: No thyromegaly.   Cardiovascular:      Rate and Rhythm: Normal rate and regular rhythm.      Heart sounds: Normal heart sounds.   Pulmonary:      Effort: Pulmonary effort is normal. No respiratory distress.      Breath sounds: Normal breath sounds.   Abdominal:      General: Bowel sounds are normal. There is no distension.      Palpations: Abdomen is soft.   Musculoskeletal: Normal range of motion.         General: No tenderness.   Skin:     General: Skin is warm and dry.   Neurological:      Mental Status: He is alert and oriented to person, place, and time.   Psychiatric:         Behavior: Behavior normal.          Primary Care Physician   Dany Rodriguez MD    Discharge Orders      Cardiac Rehab Referral      Reason for your  hospital stay    You were admitted to the hospital secondary to undergo TAVR and also underwent permanent pace maker placement.     Follow-up and recommended labs and tests    Follow up with primary care provider, Dany Rodriguez MD, within 7 days to evaluate treatment change and for hospital follow- up.  The following labs/tests are recommended: BMP.  Follow up with cardiology as recommended     Activity    Your activity upon discharge: activity as tolerated and no driving for today     Discharge Instructions    You are started back on lisinopril 10 mg po daily and metoprolol 25 mg po BID , you are given one month script and you will need further refills from PCP or cardiology when you follow up wioth them . Please continue to take your other home medications including Pradexa and Aspirin     Full Code     Diet    Follow this diet upon discharge: Orders Placed This Encounter      Low Saturated Fat Na <2400 mg         Significant Results and Procedures   Results for orders placed or performed during the hospital encounter of 07/14/20   X-ray Chest 2 vws*    Narrative    CHEST TWO VIEWS 7/16/2020 7:45 AM     HISTORY: Status post pacer/ICD.    COMPARISON: 7/3/2020    FINDINGS:  New left-sided single-lead pacer device with the tip of the  lead projected over the inferior right ventricle. Vascular congestion  and interstitial thickening has improved compared to the prior study.  There are small bilateral pleural effusions. No pneumothorax. Prior  median sternotomy.       Impression    IMPRESSION: New left-sided subclavian pacer device without  pneumothorax.     ROGERS BARAHONA MD   EP Device    Narrative    PROCEDURES PERFORMED:  1. Conscious sedation.  2. Cardiac fluoroscopy.  3. Single-chamber permanent pacemaker implantation.  4. Temporary pacing wire extraction.    INDICATION: Symptomatic bradycardia secondary to complete AV block.    HISTORY OF PRESENT ILLNESS: 85-year old gentleman with a history of    hypertension, hyperlipidemia, permanent atrial fibrillation, chronic   kidney disease, coronary disease (CABG 2002) and severe aortic stenosis,   who underwent TAVR yesterday and procedure was complicated by heart block.    Temporary pacing wire was placed and he was referred for permanent   pacemaker implantation and temporary pacing wire removal.    Risks and benefits of the procedure were reviewed including (but not   limited to) arrhythmias, pain, infection, bleeding, skin damage from   ionizing radiation, kidney damage or allergic reaction to contrast dye,   injury to neighboring vascular structures, need for emergent   cardiopulmonary resuscitation, heart attack, stroke, death. Alternatives   were discussed including doing nothing. All questions were answered to the   patient's satisfaction. Informed consent was signed and the patient wished   to proceed.    Timeout was performed in the presence of the staff members listed above.    FLUOROSCOPY DATA:  Fluoro time:  1 minute and 35 seconds.  Radiation dose (AK): 12 mGy.  Dose-area product (DAP): 1,125 mGy.cm2.    PROCEDURE DESCRIPTION: After written, informed consent was obtained, the   patient was brought to the electrophysiology Laboratory. An intravenous   infusion of prophylactic antibiotic was begun. The chest was sterilely   prepped from the level of the iliac crest to the level of the chin with an   antibiotic soap and disinfectant, and draped appropriately. Following   infiltration with 1% lidocaine, an incision was made parallel to and 1   centimeter beneath the clavicle and extended across the deltopectoral   groove. Using blunt dissection, the incision was extended down to the   anterior pectoralis fascia, and a pocket was developed parallel to the   fascia and was extended inferiorly as well as a small distance superiorly.    A pocket puncture via fluoroscopy guidance and modified Seldinger   technique were used to attain access into the left  subclavian vein. A 6Fr   sheath was inserted. An active fixation pacing lead was advanced to the RV   apex. Stimulation and sensing thresholds were found to be satisfactory.    The lead was actively fixated, and no diaphragmatic stimulation was   noticed with high output pacing. The lead was sutured to the underlying   pectoral muscle with interrupted 0 silk sutures over a plastic collar.    After irrigation with antibiotic solution, the pocket was inspected and no   bleeding was seen. The generator was connected to the lead and inserted   into the pocket. The wound was closed with 3 layers of subcutaneous   sutures.    After permanent pacemaker implantation, the temporary pacing wire was   easily removed under fluoroscopy.    DEVICE GENERATOR INFORMATION:  [employer], Model W1SR01, serial number [id number].    LEAD INFORMATION:  Right ventricle lead: [employer], model 5076/ 52, serial number [id number].    MEASURED DATA:  Right ventricle lead: sensing- 6.8 mV, threshold 0.4V@0.4 ms, impedance   494 Ohms.    IMPRESSION:  1. Successful implantation of a permanent pacemaker in the left   infraclavicular region above the pectoral fascia.   2. Bradycardia pacing set to VVI at 50 bpm.    RECOMMENDATIONS:  1. Avoid vascular access through the left jugular and subclavian veins.  2. Keep wound dry, clean, and covered for 3 days.  3. PA and lateral CXR prior to discharge.  4. Left arm sling overnight, then off in am.  5. May restart all oral medications, including oral anticoagulants. Avoid   IV or SQ heparin for at least two weeks. If heparin must be used, please   contact procedure attending to discuss.  6. Wound care as follows:  a. Do not get incision wet for 3 days.  b. Leave steri strips in place allowing them to come off naturally. If   they do not come off in 2 weeks you may remove them.  c. Check your incision and call our office if you notice one of the   following: redness, drainage (pus or blood), swelling, warmth  or if you   develop a fever.    If you have questions regarding your wound care, please contact our   office.    COMPLICATIONS:  No immediate complications were apparent.    Tommie Sauer MD       Echo Limited    Narrative    [id number]  [facility]  [id number]  [id number]^MOR^ROEL                                                                          Version 2        [facility]  Echocardiography Laboratory  [address] [place] [address]        Name: SRIKANTH OBANDO  MRN: [id number]  : 1934  Study Date: 07/15/2020 08:51 AM  Age: 85 yrs  Gender: Male  Patient Location: [facility]  Reason For Study: Aortic Valve Replacement  Ordering Physician: ROEL JUARES  Referring Physician: Dany Rodriguez  Performed By: Glenn Costa RDCS     BSA: 1.8 m2  Height: 68 in  Weight: 157 lb  HR: 79  BP: 140/70 mmHg  _____________________________________________________________________________  __        Procedure  Limited Portable Echo Adult.  _____________________________________________________________________________  __        Interpretation Summary     The visual ejection fraction is estimated at 60-65%.  No regional wall motion abnormalities noted.  There is a bioprosthetic aortic valve (29-mm Medtronic).  The prosthetic aortic valve is well-seated.  The prosthetic aortic valve appears to open well.  The gradient is normal for this prosthetic aortic valve (5mmHg).  Trace posterior amanda-prosthetic aortic valve regurgitation.  _____________________________________________________________________________  __        Left Ventricle  The visual ejection fraction is estimated at 60-65%. No regional wall motion  abnormalities noted.     Right Ventricle  There is a catheter/pacemaker lead seen in the right ventricle.     Atria  The left atrium is severely dilated. The right atrium is severely dilated.     Mitral Valve  There is mild to moderate mitral annular calcification. There is trace  mitral  regurgitation.        Tricuspid Valve  The right ventricular systolic pressure is approximated at 40.5 mmHg plus the  right atrial pressure. There is trace tricuspid regurgitation. IVC diameter  and respiratory changes fall into an intermediate range suggesting an RA  pressure of 8 mmHg.     Aortic Valve  Trace posterior amanda-prosthetic aortic valve regurgitation. The mean AoV  pressure gradient is 5.0 mmHg. The prosthetic aortic valve is well-seated. The  prosthetic aortic valve appears to open well. The gradient is normal for this  prosthetic aortic valve. There is a bioprosthetic aortic valve.     Pulmonic Valve  There is no pulmonic valvular regurgitation.  _____________________________________________________________________________  __     MMode/2D Measurements & Calculations  LVOT diam: 2.0 cm  LVOT area: 3.3 cm2        Doppler Measurements & Calculations  Ao V2 max: 183.0 cm/sec  Ao max P.4 mmHg  Ao V2 mean: 100.6 cm/sec  Ao mean P.0 mmHg  Ao V2 VTI: 28.4 cm  LAUREEN(I,D): 2.6 cm2  LAURENE(V,D): 2.5 cm2  LV V1 max P.6 mmHg  LV V1 max: 137.9 cm/sec  LV V1 VTI: 22.4 cm  SV(LVOT): 73.0 ml  SI(LVOT): 39.6 ml/m2  TR max vishnu: 318.0 cm/sec  TR max P.5 mmHg  AV Vishnu Ratio (DI): 0.75  LAUREEN Index (cm2/m2): 1.4              _____________________________________________________________________________  __        Report approved by: Billie Esteves 07/15/2020 12:06 PM      Cardiac Catheterization    Narrative    Successful implantation of a 29 mm Medtronic Evolut Pro Plus valve.  Complete heart block that will require Electrophysiology consultation for   a permanent pacemaker.        Discharge Medications   Current Discharge Medication List      START taking these medications    Details   nitroGLYcerin (NITROSTAT) 0.4 MG sublingual tablet For chest pain place 1 tablet under the tongue every 5 minutes for 3 doses. If symptoms persist 5 minutes after 1st dose call 911.  Qty: 12 tablet, Refills: 0     Comments: Future refills by PCP Dr. Dany Rodriguez MD with phone number [phone].  Associated Diagnoses: Severe aortic stenosis; Complete heart block (H); Permanent atrial fibrillation (H); Thrombus of left atrial appendage         CONTINUE these medications which have CHANGED    Details   lisinopril (ZESTRIL) 10 MG tablet Take 1 tablet (10 mg) by mouth daily  Qty: 30 tablet, Refills: 0    Comments: Future refills by PCP Dr. Dany Rodriguez MD with phone number [phone].  Associated Diagnoses: Severe aortic stenosis; Complete heart block (H); Permanent atrial fibrillation (H); Thrombus of left atrial appendage      metoprolol tartrate (LOPRESSOR) 25 MG tablet Take 1 tablet (25 mg) by mouth 2 times daily  Qty: 60 tablet, Refills: 0    Comments: Future refills by PCP Dr. Dany Rodriguez MD with phone number [phone].  Associated Diagnoses: Severe aortic stenosis; Complete heart block (H); Permanent atrial fibrillation (H); Thrombus of left atrial appendage         CONTINUE these medications which have NOT CHANGED    Details   albuterol (PROAIR HFA/PROVENTIL HFA/VENTOLIN HFA) 108 (90 Base) MCG/ACT inhaler Inhale 2 puffs into the lungs 4 times daily as needed for shortness of breath / dyspnea or wheezing    Comments: Pharmacy may dispense brand covered by insurance (Proair, or proventil or ventolin or generic albuterol inhaler)      aspirin (ASA) 81 MG EC tablet Take 81 mg by mouth daily      budesonide (PULMICORT) 0.5 MG/2ML nebulizer solution Take 2 mLs (0.5 mg) by nebulization 2 times daily  Qty: 120 mL, Refills: 0    Associated Diagnoses: Moderate persistent asthma      calcium carbonate 600 mg-vitamin D 400 units (CALTRATE) 600-400 MG-UNIT per tablet Take 1 tablet by mouth daily      dabigatran ANTICOAGULANT (PRADAXA) 150 MG capsule Take 1 capsule (150 mg) by mouth 2 times daily Store in original 's bottle or blister pack; use within 120 days of opening.  Qty: 180  capsule, Refills: 3    Associated Diagnoses: Coronary artery disease involving native coronary artery of native heart without angina pectoris      ferrous sulfate (IRON) 325 (65 Fe) MG tablet TAKE 1 TABLET(325 MG) BY MOUTH TWICE DAILY  Qty: 180 tablet, Refills: 0    Associated Diagnoses: Anemia, unspecified type      ipratropium - albuterol 0.5 mg/2.5 mg/3 mL (DUONEB) 0.5-2.5 (3) MG/3ML nebulization Inhale 1 vial (3 mLs) into the lungs 4 times daily  Qty: 360 mL, Refills: 11    Associated Diagnoses: Moderate persistent asthma, uncomplicated      meclizine (ANTIVERT) 12.5 MG tablet Take 12.5 mg by mouth 3 times daily as needed for dizziness      montelukast (SINGULAIR) 10 MG tablet TAKE 1 TABLET(10 MG) BY MOUTH DAILY  Qty: 90 tablet, Refills: 1    Comments: **Patient requests 90 days supply**  Associated Diagnoses: Moderate persistent asthma      multivitamin, therapeutic (THERA-VIT) TABS tablet Take 1 tablet by mouth every evening       pantoprazole (PROTONIX) 40 MG EC tablet Take 1 tablet (40 mg) by mouth 2 times daily (before meals)  Qty: 60 tablet, Refills: 1    Associated Diagnoses: Ulcer of esophagus with bleeding      simvastatin (ZOCOR) 40 MG tablet TAKE 1 TABLET(40 MG) BY MOUTH AT BEDTIME  Qty: 90 tablet, Refills: 1    Associated Diagnoses: Hyperlipidemia with target LDL less than 100           Allergies   Allergies   Allergen Reactions     No Known Drug Allergies

## 2020-07-16 NOTE — PLAN OF CARE
Vital signs stable. Medications discussed, Information reviewed, Questions answered, and concerns addressed. Follow-up Instructions reviewed. Pt belongings accounted for and sent home with them. Pt left via wheelchair and driven by Son.

## 2020-07-17 ENCOUNTER — TELEPHONE (OUTPATIENT)
Dept: CARDIOLOGY | Facility: CLINIC | Age: 85
End: 2020-07-17

## 2020-07-17 LAB — INTERPRETATION ECG - MUSE: NORMAL

## 2020-07-17 NOTE — TELEPHONE ENCOUNTER
Called patient's son, Willi to see how patient is doing following TAVR and PPM procedure.  Willi states he is doing well, no problems.  Groins both look good.  He still has his pacemake dressing on, but was told by pacemaker nurse to leave that one on.  Will be seen in pacemaker clinic next week.

## 2020-07-20 LAB — INTERPRETATION ECG - MUSE: NORMAL

## 2020-07-21 NOTE — PROGRESS NOTES
Primary Cardiologist: Dr. Field    Reason for Visit: 1 week post TAVR follow up    History of Present Illness:   This is a 85 year old male with a past medical history significant for aortic stenosis, CAD s/p CABG ( LIMA to LAD, vein graft to OM and RCA, atrial fibrillation w/ MAYA thrombus on chronic anticoagulation, hearing loss, chromic anemia, chronic diastolic heart failure, hypertension, asthma, chronic kidney disease stage III, recent GI bleed requiring transfusion.     He underwent elective TAVR, receiving 29 mm Medtronic Evolut Pro Plus valve with POD # 1 echo showing 5 mmHg with preserved LVEF. His antiplatelet regimen included pradaxa and aspirin. He unfortunately developed  CHB, requiring PPM implantation.    He reports feeling well. He has no new concerns or questions. He denies pain at the pacemaker site. He has no bleeding issues. His blood pressure runs a bit high with SBP in the 140's.     Assessment and Plan:  This is a 85 year old male with a past medical history significant for aortic stenosis (s/p TAVR a week ago with complication of CHB requiring PPM), CAD s/p CABG ( LIMA to LAD, vein graft to OM and RCA, atrial fibrillation w/ MAYA thrombus on chronic anticoagulation, hearing loss, chromic anemia, chronic diastolic heart failure, hypertension, asthma, chronic kidney disease stage III, recent GI bleed requiring transfusion.     I asked him to increase lisinopril to 20 mg daily for more optimal blood pressure. He will continue with rest of his medications without any changes. We also talked about him wearing compression stockings as he reports slightly worse peripheral edema. He has no other symptoms such as orthopnea, PND, or SOB to suggest HF. He will follow up with us in a few weeks.      This note was completed in part using Dragon voice recognition software. Although reviewed after completion, some word and grammatical errors may occur.    Orders this Visit:  No orders of the defined types were  placed in this encounter.    Orders Placed This Encounter   Medications     lisinopril (ZESTRIL) 10 MG tablet     Sig: Take 2 tablets (20 mg) by mouth daily     Dispense:  30 tablet     Refill:  0     Medications Discontinued During This Encounter   Medication Reason     lisinopril (ZESTRIL) 10 MG tablet          Encounter Diagnoses   Name Primary?     S/P TAVR (transcatheter aortic valve replacement) Yes     Cardiac pacemaker in situ      Coronary artery disease involving native coronary artery of native heart without angina pectoris      Hyperlipidemia with target LDL less than 100      Severe aortic stenosis      Complete heart block (H)      Permanent atrial fibrillation (H)      Thrombus of left atrial appendage        CURRENT MEDICATIONS:  Current Outpatient Medications   Medication Sig Dispense Refill     albuterol (PROAIR HFA/PROVENTIL HFA/VENTOLIN HFA) 108 (90 Base) MCG/ACT inhaler Inhale 2 puffs into the lungs 4 times daily as needed for shortness of breath / dyspnea or wheezing       aspirin (ASA) 81 MG EC tablet Take 81 mg by mouth daily       budesonide (PULMICORT) 0.5 MG/2ML nebulizer solution Take 2 mLs (0.5 mg) by nebulization 2 times daily 120 mL 0     calcium carbonate 600 mg-vitamin D 400 units (CALTRATE) 600-400 MG-UNIT per tablet Take 1 tablet by mouth daily       dabigatran ANTICOAGULANT (PRADAXA) 150 MG capsule Take 1 capsule (150 mg) by mouth 2 times daily Store in original 's bottle or blister pack; use within 120 days of opening. 180 capsule 3     ferrous sulfate (IRON) 325 (65 Fe) MG tablet TAKE 1 TABLET(325 MG) BY MOUTH TWICE DAILY 180 tablet 0     ipratropium - albuterol 0.5 mg/2.5 mg/3 mL (DUONEB) 0.5-2.5 (3) MG/3ML nebulization Inhale 1 vial (3 mLs) into the lungs 4 times daily 360 mL 11     lisinopril (ZESTRIL) 10 MG tablet Take 2 tablets (20 mg) by mouth daily 30 tablet 0     meclizine (ANTIVERT) 12.5 MG tablet Take 12.5 mg by mouth 3 times daily as needed for dizziness        metoprolol tartrate (LOPRESSOR) 25 MG tablet Take 1 tablet (25 mg) by mouth 2 times daily 60 tablet 0     montelukast (SINGULAIR) 10 MG tablet TAKE 1 TABLET(10 MG) BY MOUTH DAILY 90 tablet 1     multivitamin, therapeutic (THERA-VIT) TABS tablet Take 1 tablet by mouth every evening        nitroGLYcerin (NITROSTAT) 0.4 MG sublingual tablet For chest pain place 1 tablet under the tongue every 5 minutes for 3 doses. If symptoms persist 5 minutes after 1st dose call 911. 12 tablet 0     pantoprazole (PROTONIX) 40 MG EC tablet Take 1 tablet (40 mg) by mouth 2 times daily (before meals) 60 tablet 1     simvastatin (ZOCOR) 40 MG tablet TAKE 1 TABLET(40 MG) BY MOUTH AT BEDTIME 90 tablet 1       ALLERGIES     Allergies   Allergen Reactions     No Known Drug Allergies        PAST MEDICAL HISTORY:  Past Medical History:   Diagnosis Date     Allergic rhinitis, cause unspecified      Anemia, unspecified      Aortic stenosis     severe     Benign neoplasm of colon 1999    Ademonatous polyp     CKD (chronic kidney disease) stage 3, GFR 30-59 ml/min (H) 5/12/2011     Coronary atherosclerosis of unspecified type of vessel, native or graft     s/p CABG 2002     Hyperlipidemia LDL goal < 100      Hypertension goal BP (blood pressure) < 140/90      Localized osteoarthrosis not specified whether primary or secondary, lower leg     left knee     Moderate persistent asthma      Persistent atrial fibrillation (H)      Unspecified hearing loss        PAST SURGICAL HISTORY:  Past Surgical History:   Procedure Laterality Date     C NONSPECIFIC PROCEDURE  5/02    Coronary artery bypass     CARDIAC SURGERY       COLONOSCOPY N/A 5/26/2020    Procedure: COLONOSCOPY;  Surgeon: Ignacio Fournier MD;  Location:  GI     CV ANGIOGRAM CORONARY GRAFT N/A 4/24/2020    Procedure: Angiogram Coronary Graft;  Surgeon: Addison Willard MD;  Location:  HEART CARDIAC CATH LAB     CV CORONARY ANGIOGRAM N/A 4/24/2020    Procedure: Coronary  Angiogram;  Surgeon: Addison Willard MD;  Location:  HEART CARDIAC CATH LAB     CV RIGHT HEART CATH N/A 2020    Procedure: Right Heart Cath;  Surgeon: Addison Willard MD;  Location:  HEART CARDIAC CATH LAB     CV TRANSCATHETER AORTIC VALVE REPLACEMENT N/A 2020    Procedure: Transcatheter Aortic Valve Replacement;  Surgeon: Soraida Monet MD;  Location:  HEART CARDIAC CATH LAB     EP PACEMAKER N/A 7/15/2020    Procedure: EP Pacemaker;  Surgeon: Tommie Sauer MD;  Location:  HEART CARDIAC CATH LAB     HC COLONOSCOPY THRU STOMA W BIOPSY/CAUTERY TUMOR/POLYP/LESION      Dr. Dow, Q 5 years     HC COLONOSCOPY THRU STOMA W BIOPSY/CAUTERY TUMOR/POLYP/LESION      Dr. Dow, one adenomatous polyp     HC ESOPHAGOSCOPY, DIAGNOSTIC      Dr. Dow, lower esophageal ring & mild gastritis     HERNIORRHAPHY INGUINAL Right 2016    Procedure: HERNIORRHAPHY INGUINAL;  Surgeon: Alexis Alexandra MD;  Location: [facility]     LAPAROSCOPIC CHOLECYSTECTOMY      for biliary sludge       FAMILY HISTORY:  Family History   Problem Relation Age of Onset     C.A.D. Father      Cancer Mother         breast cancer,  of pneumonia     Cerebrovascular Disease Son      CABG Son      Family History Negative Child      Family History Negative Sister      Heart Disease Brother        SOCIAL HISTORY:  Social History     Socioeconomic History     Marital status:      Spouse name: Kailey     Number of children: 3     Years of education: None     Highest education level: None   Occupational History     Occupation: upholstery     Employer: RETIRED   Social Needs     Financial resource strain: None     Food insecurity     Worry: None     Inability: None     Transportation needs     Medical: None     Non-medical: None   Tobacco Use     Smoking status: Former Smoker     Types: Cigarettes     Smokeless tobacco: Never Used     Tobacco comment: smoked for a week in 20's   Substance  and Sexual Activity     Alcohol use: Yes     Alcohol/week: 0.0 standard drinks     Drug use: No     Sexual activity: Yes     Partners: Female   Lifestyle     Physical activity     Days per week: None     Minutes per session: None     Stress: None   Relationships     Social connections     Talks on phone: None     Gets together: None     Attends [employer] service: None     Active member of club or organization: None     Attends meetings of clubs or organizations: None     Relationship status: None     Intimate partner violence     Fear of current or ex partner: None     Emotionally abused: None     Physically abused: None     Forced sexual activity: None   Other Topics Concern      Service No     Blood Transfusions No     Caffeine Concern Not Asked     Occupational Exposure Not Asked     Hobby Hazards Not Asked     Sleep Concern Not Asked     Stress Concern Not Asked     Weight Concern Not Asked     Special Diet Not Asked     Back Care Not Asked     Exercise Yes     Bike Helmet Not Asked     Seat Belt Yes     Self-Exams No     Parent/sibling w/ CABG, MI or angioplasty before 65F 55M? Yes   Social History Narrative    Balanced Diet - Yes    Osteoporosis Preventative measures-  Dairy servings per day: 2 to 3 servings daily    Regular Exercise -  Yes Describe walks 1.5 mile daily     Dental Exam up - YES - Date: 2 years ago    Eye Exam - YES - Date: 1 year ago    Self Testicular Exam -  No, handout given    Do you have any concerns about STD's -  No    Abuse: Current or Past (Physical, Sexual or Emotional)- No    Do you feel safe in your environment - Yes    Guns stored in the home - Yes, locked    Sunscreen used - No    Seatbelts used - Yes    Lipids - YES - Date: 3-09    Glucose -  YES - Date: 3-09    Colon Cancer Screening - Colonoscopy 3-08(date completed)    Hemoccults - UNKNOWN    PSA - YES - Date: 11-07    Digital Rectal Exam - UNKNOWN    Immunizations reviewed and up to date - Yes, td 1-2005, had  shingles vaccine    5-28-09  JANEY Patel CMA                       Review of Systems:  Skin:  Negative     Eyes:  Positive for glasses  ENT:  Negative    Respiratory:  Negative    Cardiovascular:    Positive for  Gastroenterology: Negative    Genitourinary:  not assessed    Musculoskeletal:  Negative    Neurologic:  Negative    Psychiatric:  Negative    Heme/Lymph/Imm:  Positive for allergies  Endocrine:  Negative      Physical Exam:  Vitals: BP (!) 167/68   Pulse 60   Wt 71.2 kg (157 lb)   BMI 24.23 kg/m       GEN:  NAD  NECK: No JVD  C/V:  Irregularly irregular rhythm with normal rate. No m,r,g.  RESP: Clear to auscultation bilaterally without wheezing, rales, or rhonchi.  GI: Abdomen soft, nontender, nondistended. No HSM appreciated.   EXTREM: 2+ pitting edema yane (R>L).  NEURO: Alert and oriented, cooperative. No obvious focal deficits.   PSYCH: Normal affect.  SKIN: Warm and dry.       Recent Lab Results:  LIPID RESULTS:  Lab Results   Component Value Date    CHOL 119 06/01/2020    HDL 48 06/01/2020    LDL 54 06/01/2020    TRIG 87 06/01/2020    CHOLHDLRATIO 3.0 07/07/2015       LIVER ENZYME RESULTS:  Lab Results   Component Value Date    AST 20 07/14/2020    ALT 21 07/14/2020       CBC RESULTS:  Lab Results   Component Value Date    WBC 4.8 07/16/2020    RBC 4.27 (L) 07/16/2020    HGB 9.6 (L) 07/16/2020    HCT 31.0 (L) 07/16/2020    MCV 73 (L) 07/16/2020    MCH 22.5 (L) 07/16/2020    MCHC 31.0 (L) 07/16/2020    RDW 18.8 (H) 07/16/2020     (L) 07/16/2020       BMP RESULTS:  Lab Results   Component Value Date     07/16/2020    POTASSIUM 4.4 07/16/2020    CHLORIDE 107 07/16/2020    CO2 26 07/16/2020    ANIONGAP 7 07/16/2020     (H) 07/16/2020    BUN 23 07/16/2020    CR 1.37 (H) 07/16/2020    GFRESTIMATED 45 (L) 07/16/2020    GFRESTBLACK 53 (L) 07/16/2020    AYALA 9.1 07/16/2020        A1C RESULTS:  No results found for: A1C    INR RESULTS:  Lab Results   Component Value Date    INR 1.21 (H)  07/13/2020    INR 1.25 (H) 06/03/2020           Adam Mcgarry PA-C, PA-C   July 21, 2020

## 2020-07-22 ENCOUNTER — OFFICE VISIT (OUTPATIENT)
Dept: CARDIOLOGY | Facility: CLINIC | Age: 85
End: 2020-07-22
Attending: INTERNAL MEDICINE
Payer: COMMERCIAL

## 2020-07-22 VITALS
HEART RATE: 60 BPM | BODY MASS INDEX: 24.23 KG/M2 | WEIGHT: 157 LBS | DIASTOLIC BLOOD PRESSURE: 68 MMHG | SYSTOLIC BLOOD PRESSURE: 167 MMHG

## 2020-07-22 DIAGNOSIS — Z95.2 S/P TAVR (TRANSCATHETER AORTIC VALVE REPLACEMENT): Primary | ICD-10-CM

## 2020-07-22 DIAGNOSIS — Z95.0 CARDIAC PACEMAKER IN SITU: Primary | ICD-10-CM

## 2020-07-22 DIAGNOSIS — Z95.0 CARDIAC PACEMAKER IN SITU: ICD-10-CM

## 2020-07-22 DIAGNOSIS — I48.21 PERMANENT ATRIAL FIBRILLATION (H): ICD-10-CM

## 2020-07-22 DIAGNOSIS — I35.0 SEVERE AORTIC STENOSIS: ICD-10-CM

## 2020-07-22 DIAGNOSIS — I44.2 COMPLETE HEART BLOCK (H): ICD-10-CM

## 2020-07-22 DIAGNOSIS — I51.3 THROMBUS OF LEFT ATRIAL APPENDAGE: ICD-10-CM

## 2020-07-22 DIAGNOSIS — I25.10 CORONARY ARTERY DISEASE INVOLVING NATIVE CORONARY ARTERY OF NATIVE HEART WITHOUT ANGINA PECTORIS: ICD-10-CM

## 2020-07-22 DIAGNOSIS — I49.5 SSS (SICK SINUS SYNDROME) (H): ICD-10-CM

## 2020-07-22 DIAGNOSIS — E78.5 HYPERLIPIDEMIA WITH TARGET LDL LESS THAN 100: ICD-10-CM

## 2020-07-22 PROCEDURE — 99214 OFFICE O/P EST MOD 30 MIN: CPT | Mod: 25 | Performed by: PHYSICIAN ASSISTANT

## 2020-07-22 PROCEDURE — T1013 SIGN LANG/ORAL INTERPRETER: HCPCS | Mod: U3

## 2020-07-22 PROCEDURE — 93279 PRGRMG DEV EVAL PM/LDLS PM: CPT | Performed by: INTERNAL MEDICINE

## 2020-07-22 RX ORDER — LISINOPRIL 20 MG/1
20 TABLET ORAL DAILY
Qty: 30 TABLET | Refills: 0 | COMMUNITY
Start: 2020-07-22 | End: 2020-08-05

## 2020-07-22 NOTE — LETTER
7/22/2020    Dany Rodriguez MD, MD  [address] [place] [id number]    RE: Shiraz Ingram       Dear Colleague,    I had the pleasure of seeing Shiraz Ingram in the [facility].    Primary Cardiologist: Dr. Field    Reason for Visit: 1 week post TAVR follow up    History of Present Illness:   This is a 85 year old male with a past medical history significant for aortic stenosis, CAD s/p CABG ( LIMA to LAD, vein graft to OM and RCA, atrial fibrillation w/ MAYA thrombus on chronic anticoagulation, hearing loss, chromic anemia, chronic diastolic heart failure, hypertension, asthma, chronic kidney disease stage III, recent GI bleed requiring transfusion.     He underwent elective TAVR, receiving 29 mm Medtronic Evolut Pro Plus valve with POD # 1 echo showing 5 mmHg with preserved LVEF. His antiplatelet regimen included pradaxa and aspirin. He unfortunately developed  CHB, requiring PPM implantation.    He reports feeling well. He has no new concerns or questions. He denies pain at the pacemaker site. He has no bleeding issues. His blood pressure runs a bit high with SBP in the 140's.     Assessment and Plan:  This is a 85 year old male with a past medical history significant for aortic stenosis (s/p TAVR a week ago with complication of CHB requiring PPM), CAD s/p CABG ( LIMA to LAD, vein graft to OM and RCA, atrial fibrillation w/ MAYA thrombus on chronic anticoagulation, hearing loss, chromic anemia, chronic diastolic heart failure, hypertension, asthma, chronic kidney disease stage III, recent GI bleed requiring transfusion.     I asked him to increase lisinopril to 20 mg daily for more optimal blood pressure. He will continue with rest of his medications without any changes. We also talked about him wearing compression stockings as he reports slightly worse peripheral edema. He has no other symptoms such as orthopnea, PND, or SOB to suggest HF. He will follow up with us in a few  weeks.      This note was completed in part using Dragon voice recognition software. Although reviewed after completion, some word and grammatical errors may occur.    Orders this Visit:  No orders of the defined types were placed in this encounter.    Orders Placed This Encounter   Medications     lisinopril (ZESTRIL) 10 MG tablet     Sig: Take 2 tablets (20 mg) by mouth daily     Dispense:  30 tablet     Refill:  0     Medications Discontinued During This Encounter   Medication Reason     lisinopril (ZESTRIL) 10 MG tablet          Encounter Diagnoses   Name Primary?     S/P TAVR (transcatheter aortic valve replacement) Yes     Cardiac pacemaker in situ      Coronary artery disease involving native coronary artery of native heart without angina pectoris      Hyperlipidemia with target LDL less than 100      Severe aortic stenosis      Complete heart block (H)      Permanent atrial fibrillation (H)      Thrombus of left atrial appendage        CURRENT MEDICATIONS:  Current Outpatient Medications   Medication Sig Dispense Refill     albuterol (PROAIR HFA/PROVENTIL HFA/VENTOLIN HFA) 108 (90 Base) MCG/ACT inhaler Inhale 2 puffs into the lungs 4 times daily as needed for shortness of breath / dyspnea or wheezing       aspirin (ASA) 81 MG EC tablet Take 81 mg by mouth daily       budesonide (PULMICORT) 0.5 MG/2ML nebulizer solution Take 2 mLs (0.5 mg) by nebulization 2 times daily 120 mL 0     calcium carbonate 600 mg-vitamin D 400 units (CALTRATE) 600-400 MG-UNIT per tablet Take 1 tablet by mouth daily       dabigatran ANTICOAGULANT (PRADAXA) 150 MG capsule Take 1 capsule (150 mg) by mouth 2 times daily Store in original 's bottle or blister pack; use within 120 days of opening. 180 capsule 3     ferrous sulfate (IRON) 325 (65 Fe) MG tablet TAKE 1 TABLET(325 MG) BY MOUTH TWICE DAILY 180 tablet 0     ipratropium - albuterol 0.5 mg/2.5 mg/3 mL (DUONEB) 0.5-2.5 (3) MG/3ML nebulization Inhale 1 vial (3 mLs) into  the lungs 4 times daily 360 mL 11     lisinopril (ZESTRIL) 10 MG tablet Take 2 tablets (20 mg) by mouth daily 30 tablet 0     meclizine (ANTIVERT) 12.5 MG tablet Take 12.5 mg by mouth 3 times daily as needed for dizziness       metoprolol tartrate (LOPRESSOR) 25 MG tablet Take 1 tablet (25 mg) by mouth 2 times daily 60 tablet 0     montelukast (SINGULAIR) 10 MG tablet TAKE 1 TABLET(10 MG) BY MOUTH DAILY 90 tablet 1     multivitamin, therapeutic (THERA-VIT) TABS tablet Take 1 tablet by mouth every evening        nitroGLYcerin (NITROSTAT) 0.4 MG sublingual tablet For chest pain place 1 tablet under the tongue every 5 minutes for 3 doses. If symptoms persist 5 minutes after 1st dose call 911. 12 tablet 0     pantoprazole (PROTONIX) 40 MG EC tablet Take 1 tablet (40 mg) by mouth 2 times daily (before meals) 60 tablet 1     simvastatin (ZOCOR) 40 MG tablet TAKE 1 TABLET(40 MG) BY MOUTH AT BEDTIME 90 tablet 1       ALLERGIES     Allergies   Allergen Reactions     No Known Drug Allergies        PAST MEDICAL HISTORY:  Past Medical History:   Diagnosis Date     Allergic rhinitis, cause unspecified      Anemia, unspecified      Aortic stenosis     severe     Benign neoplasm of colon 1999    Ademonatous polyp     CKD (chronic kidney disease) stage 3, GFR 30-59 ml/min (H) 5/12/2011     Coronary atherosclerosis of unspecified type of vessel, native or graft     s/p CABG 2002     Hyperlipidemia LDL goal < 100      Hypertension goal BP (blood pressure) < 140/90      Localized osteoarthrosis not specified whether primary or secondary, lower leg     left knee     Moderate persistent asthma      Persistent atrial fibrillation (H)      Unspecified hearing loss        PAST SURGICAL HISTORY:  Past Surgical History:   Procedure Laterality Date     C NONSPECIFIC PROCEDURE  5/02    Coronary artery bypass     CARDIAC SURGERY       COLONOSCOPY N/A 5/26/2020    Procedure: COLONOSCOPY;  Surgeon: Ignacio Fournier MD;  Location: [facility]      CV ANGIOGRAM CORONARY GRAFT N/A 2020    Procedure: Angiogram Coronary Graft;  Surgeon: Addison Willard MD;  Location:  HEART CARDIAC CATH LAB     CV CORONARY ANGIOGRAM N/A 2020    Procedure: Coronary Angiogram;  Surgeon: Addison Willard MD;  Location:  HEART CARDIAC CATH LAB     CV RIGHT HEART CATH N/A 2020    Procedure: Right Heart Cath;  Surgeon: Addison Willard MD;  Location:  HEART CARDIAC CATH LAB     CV TRANSCATHETER AORTIC VALVE REPLACEMENT N/A 2020    Procedure: Transcatheter Aortic Valve Replacement;  Surgeon: Soraida Monet MD;  Location:  HEART CARDIAC CATH LAB     EP PACEMAKER N/A 7/15/2020    Procedure: EP Pacemaker;  Surgeon: Tommie Sauer MD;  Location:  HEART CARDIAC CATH LAB     HC COLONOSCOPY THRU STOMA W BIOPSY/CAUTERY TUMOR/POLYP/LESION      Dr. Dow, Q 5 years     HC COLONOSCOPY THRU STOMA W BIOPSY/CAUTERY TUMOR/POLYP/LESION      Dr. Dow, one adenomatous polyp     HC ESOPHAGOSCOPY, DIAGNOSTIC      Dr. Dow, lower esophageal ring & mild gastritis     HERNIORRHAPHY INGUINAL Right 2016    Procedure: HERNIORRHAPHY INGUINAL;  Surgeon: Alexis Alexandra MD;  Location: [facility]     LAPAROSCOPIC CHOLECYSTECTOMY      for biliary sludge       FAMILY HISTORY:  Family History   Problem Relation Age of Onset     C.A.D. Father      Cancer Mother         breast cancer,  of pneumonia     Cerebrovascular Disease Son      CABG Son      Family History Negative Child      Family History Negative Sister      Heart Disease Brother        SOCIAL HISTORY:  Social History     Socioeconomic History     Marital status:      Spouse name: Kailey     Number of children: 3     Years of education: None     Highest education level: None   Occupational History     Occupation: [employer]     Employer: RETIRED   Social Needs     Financial resource strain: None     Food insecurity     Worry: None     Inability: None      Transportation needs     Medical: None     Non-medical: None   Tobacco Use     Smoking status: Former Smoker     Types: Cigarettes     Smokeless tobacco: Never Used     Tobacco comment: smoked for a week in 20's   Substance and Sexual Activity     Alcohol use: Yes     Alcohol/week: 0.0 standard drinks     Drug use: No     Sexual activity: Yes     Partners: Female   Lifestyle     Physical activity     Days per week: None     Minutes per session: None     Stress: None   Relationships     Social connections     Talks on phone: None     Gets together: None     Attends Muslim service: None     Active member of club or organization: None     Attends meetings of clubs or organizations: None     Relationship status: None     Intimate partner violence     Fear of current or ex partner: None     Emotionally abused: None     Physically abused: None     Forced sexual activity: None   Other Topics Concern      Service No     Blood Transfusions No     Caffeine Concern Not Asked     Occupational Exposure Not Asked     Hobby Hazards Not Asked     Sleep Concern Not Asked     Stress Concern Not Asked     Weight Concern Not Asked     Special Diet Not Asked     Back Care Not Asked     Exercise Yes     Bike Helmet Not Asked     Seat Belt Yes     Self-Exams No     Parent/sibling w/ CABG, MI or angioplasty before 65F 55M? Yes   Social History Narrative    Balanced Diet - Yes    Osteoporosis Preventative measures-  Dairy servings per day: 2 to 3 servings daily    Regular Exercise -  Yes Describe walks 1.5 mile daily     Dental Exam up - YES - Date: 2 years ago    Eye Exam - YES - Date: 1 year ago    Self Testicular Exam -  No, handout given    Do you have any concerns about STD's -  No    Abuse: Current or Past (Physical, Sexual or Emotional)- No    Do you feel safe in your environment - Yes    Guns stored in the home - Yes, locked    Sunscreen used - No    Seatbelts used - Yes    Lipids - YES - Date: 3-09    Glucose -  YES -  Date: 3-09    Colon Cancer Screening - Colonoscopy 3-08(date completed)    Hemoccults - UNKNOWN    PSA - YES - Date: 11-07    Digital Rectal Exam - UNKNOWN    Immunizations reviewed and up to date - Yes, td 1-2005, had shingles vaccine    5-28-09  JANEY Patel CMA                       Review of Systems:  Skin:  Negative     Eyes:  Positive for glasses  ENT:  Negative    Respiratory:  Negative    Cardiovascular:    Positive for  Gastroenterology: Negative    Genitourinary:  not assessed    Musculoskeletal:  Negative    Neurologic:  Negative    Psychiatric:  Negative    Heme/Lymph/Imm:  Positive for allergies  Endocrine:  Negative      Physical Exam:  Vitals: BP (!) 167/68   Pulse 60   Wt 71.2 kg (157 lb)   BMI 24.23 kg/m       GEN:  NAD  NECK: No JVD  C/V:  Irregularly irregular rhythm with normal rate. No m,r,g.  RESP: Clear to auscultation bilaterally without wheezing, rales, or rhonchi.  GI: Abdomen soft, nontender, nondistended. No HSM appreciated.   EXTREM: 2+ pitting edema yane (R>L).  NEURO: Alert and oriented, cooperative. No obvious focal deficits.   PSYCH: Normal affect.  SKIN: Warm and dry.       Recent Lab Results:  LIPID RESULTS:  Lab Results   Component Value Date    CHOL 119 06/01/2020    HDL 48 06/01/2020    LDL 54 06/01/2020    TRIG 87 06/01/2020    CHOLHDLRATIO 3.0 07/07/2015       LIVER ENZYME RESULTS:  Lab Results   Component Value Date    AST 20 07/14/2020    ALT 21 07/14/2020       CBC RESULTS:  Lab Results   Component Value Date    WBC 4.8 07/16/2020    RBC 4.27 (L) 07/16/2020    HGB 9.6 (L) 07/16/2020    HCT 31.0 (L) 07/16/2020    MCV 73 (L) 07/16/2020    MCH 22.5 (L) 07/16/2020    MCHC 31.0 (L) 07/16/2020    RDW 18.8 (H) 07/16/2020     (L) 07/16/2020       BMP RESULTS:  Lab Results   Component Value Date     07/16/2020    POTASSIUM 4.4 07/16/2020    CHLORIDE 107 07/16/2020    CO2 26 07/16/2020    ANIONGAP 7 07/16/2020     (H) 07/16/2020    BUN 23 07/16/2020    CR 1.37 (H)  07/16/2020    GFRESTIMATED 45 (L) 07/16/2020    GFRESTBLACK 53 (L) 07/16/2020    AYALA 9.1 07/16/2020        A1C RESULTS:  No results found for: A1C    INR RESULTS:  Lab Results   Component Value Date    INR 1.21 (H) 07/13/2020    INR 1.25 (H) 06/03/2020           Adam Mcgarry PA-C, B[facility]   July 21, 2020       Thank you for allowing me to participate in the care of your patient.    Sincerely,     Adam Mcgarry PA-C     [facility]

## 2020-07-27 ENCOUNTER — APPOINTMENT (OUTPATIENT)
Dept: GENERAL RADIOLOGY | Facility: CLINIC | Age: 85
DRG: 291 | End: 2020-07-27
Attending: EMERGENCY MEDICINE
Payer: COMMERCIAL

## 2020-07-27 ENCOUNTER — HOSPITAL ENCOUNTER (INPATIENT)
Facility: CLINIC | Age: 85
LOS: 1 days | Discharge: HOME | DRG: 291 | End: 2020-07-29
Attending: EMERGENCY MEDICINE | Admitting: INTERNAL MEDICINE
Payer: COMMERCIAL

## 2020-07-27 DIAGNOSIS — Z95.2 HISTORY OF AORTIC VALVE REPLACEMENT: ICD-10-CM

## 2020-07-27 DIAGNOSIS — Z95.0 PACEMAKER: ICD-10-CM

## 2020-07-27 DIAGNOSIS — I50.33 ACUTE ON CHRONIC DIASTOLIC CONGESTIVE HEART FAILURE (H): Primary | ICD-10-CM

## 2020-07-27 DIAGNOSIS — Z79.01 LONG TERM CURRENT USE OF ANTICOAGULANT THERAPY: ICD-10-CM

## 2020-07-27 DIAGNOSIS — R06.00 DYSPNEA, UNSPECIFIED TYPE: ICD-10-CM

## 2020-07-27 DIAGNOSIS — I10 HYPERTENSION, UNSPECIFIED TYPE: ICD-10-CM

## 2020-07-27 DIAGNOSIS — G47.00 INSOMNIA, UNSPECIFIED TYPE: ICD-10-CM

## 2020-07-27 DIAGNOSIS — I50.23 ACUTE ON CHRONIC SYSTOLIC CONGESTIVE HEART FAILURE (H): ICD-10-CM

## 2020-07-27 DIAGNOSIS — N28.9 RENAL INSUFFICIENCY: ICD-10-CM

## 2020-07-27 DIAGNOSIS — E87.70 HYPERVOLEMIA, UNSPECIFIED HYPERVOLEMIA TYPE: ICD-10-CM

## 2020-07-27 LAB
ANION GAP SERPL CALCULATED.3IONS-SCNC: 3 MMOL/L (ref 3–14)
ANISOCYTOSIS BLD QL SMEAR: ABNORMAL
BASOPHILS # BLD AUTO: 0 10E9/L (ref 0–0.2)
BASOPHILS NFR BLD AUTO: 0.7 %
BUN SERPL-MCNC: 25 MG/DL (ref 7–30)
CALCIUM SERPL-MCNC: 9.1 MG/DL (ref 8.5–10.1)
CHLORIDE SERPL-SCNC: 110 MMOL/L (ref 94–109)
CO2 SERPL-SCNC: 26 MMOL/L (ref 20–32)
CREAT SERPL-MCNC: 1.37 MG/DL (ref 0.66–1.25)
D DIMER PPP FEU-MCNC: 1.7 UG/ML FEU (ref 0–0.5)
DIFFERENTIAL METHOD BLD: ABNORMAL
EOSINOPHIL # BLD AUTO: 0.5 10E9/L (ref 0–0.7)
EOSINOPHIL NFR BLD AUTO: 11.1 %
ERYTHROCYTE [DISTWIDTH] IN BLOOD BY AUTOMATED COUNT: 18.5 % (ref 10–15)
GFR SERPL CREATININE-BSD FRML MDRD: 46 ML/MIN/{1.73_M2}
GLUCOSE SERPL-MCNC: 101 MG/DL (ref 70–99)
HCT VFR BLD AUTO: 33.7 % (ref 40–53)
HGB BLD-MCNC: 10.7 G/DL (ref 13.3–17.7)
IMM GRANULOCYTES # BLD: 0 10E9/L (ref 0–0.4)
IMM GRANULOCYTES NFR BLD: 0.2 %
INTERPRETATION ECG - MUSE: NORMAL
LYMPHOCYTES # BLD AUTO: 0.6 10E9/L (ref 0.8–5.3)
LYMPHOCYTES NFR BLD AUTO: 14.4 %
MACROCYTES BLD QL SMEAR: PRESENT
MCH RBC QN AUTO: 22.5 PG (ref 26.5–33)
MCHC RBC AUTO-ENTMCNC: 31.8 G/DL (ref 31.5–36.5)
MCV RBC AUTO: 71 FL (ref 78–100)
MONOCYTES # BLD AUTO: 0.3 10E9/L (ref 0–1.3)
MONOCYTES NFR BLD AUTO: 7.9 %
NEUTROPHILS # BLD AUTO: 2.8 10E9/L (ref 1.6–8.3)
NEUTROPHILS NFR BLD AUTO: 65.7 %
NRBC # BLD AUTO: 0 10*3/UL
NRBC BLD AUTO-RTO: 0 /100
NT-PROBNP SERPL-MCNC: 4780 PG/ML (ref 0–1800)
PLATELET # BLD AUTO: 133 10E9/L (ref 150–450)
PLATELET # BLD EST: ABNORMAL 10*3/UL
POTASSIUM SERPL-SCNC: 4.8 MMOL/L (ref 3.4–5.3)
PROCALCITONIN SERPL-MCNC: <0.05 NG/ML
RBC # BLD AUTO: 4.76 10E12/L (ref 4.4–5.9)
SARS-COV-2 RNA SPEC QL NAA+PROBE: NORMAL
SODIUM SERPL-SCNC: 139 MMOL/L (ref 133–144)
SPECIMEN SOURCE: NORMAL
TROPONIN I SERPL-MCNC: <0.015 UG/L (ref 0–0.04)
WBC # BLD AUTO: 4.3 10E9/L (ref 4–11)

## 2020-07-27 PROCEDURE — 80048 BASIC METABOLIC PNL TOTAL CA: CPT | Performed by: EMERGENCY MEDICINE

## 2020-07-27 PROCEDURE — 93005 ELECTROCARDIOGRAM TRACING: CPT

## 2020-07-27 PROCEDURE — 85379 FIBRIN DEGRADATION QUANT: CPT | Performed by: EMERGENCY MEDICINE

## 2020-07-27 PROCEDURE — C9803 HOPD COVID-19 SPEC COLLECT: HCPCS

## 2020-07-27 PROCEDURE — 96375 TX/PRO/DX INJ NEW DRUG ADDON: CPT

## 2020-07-27 PROCEDURE — 99285 EMERGENCY DEPT VISIT HI MDM: CPT | Mod: 25

## 2020-07-27 PROCEDURE — 71045 X-RAY EXAM CHEST 1 VIEW: CPT

## 2020-07-27 PROCEDURE — 84484 ASSAY OF TROPONIN QUANT: CPT | Performed by: EMERGENCY MEDICINE

## 2020-07-27 PROCEDURE — 25000132 ZZH RX MED GY IP 250 OP 250 PS 637: Performed by: EMERGENCY MEDICINE

## 2020-07-27 PROCEDURE — 25000128 H RX IP 250 OP 636: Performed by: EMERGENCY MEDICINE

## 2020-07-27 PROCEDURE — U0003 INFECTIOUS AGENT DETECTION BY NUCLEIC ACID (DNA OR RNA); SEVERE ACUTE RESPIRATORY SYNDROME CORONAVIRUS 2 (SARS-COV-2) (CORONAVIRUS DISEASE [COVID-19]), AMPLIFIED PROBE TECHNIQUE, MAKING USE OF HIGH THROUGHPUT TECHNOLOGIES AS DESCRIBED BY CMS-2020-01-R: HCPCS | Performed by: EMERGENCY MEDICINE

## 2020-07-27 PROCEDURE — 96374 THER/PROPH/DIAG INJ IV PUSH: CPT

## 2020-07-27 PROCEDURE — 83880 ASSAY OF NATRIURETIC PEPTIDE: CPT | Performed by: EMERGENCY MEDICINE

## 2020-07-27 PROCEDURE — 85025 COMPLETE CBC W/AUTO DIFF WBC: CPT | Performed by: EMERGENCY MEDICINE

## 2020-07-27 PROCEDURE — 84145 PROCALCITONIN (PCT): CPT | Performed by: EMERGENCY MEDICINE

## 2020-07-27 RX ORDER — HYDRALAZINE HYDROCHLORIDE 20 MG/ML
10 INJECTION INTRAMUSCULAR; INTRAVENOUS ONCE
Status: COMPLETED | OUTPATIENT
Start: 2020-07-27 | End: 2020-07-27

## 2020-07-27 RX ORDER — NITROGLYCERIN 80 MG/1
1 PATCH TRANSDERMAL ONCE
Status: COMPLETED | OUTPATIENT
Start: 2020-07-27 | End: 2020-07-28

## 2020-07-27 RX ORDER — FUROSEMIDE 10 MG/ML
40 INJECTION INTRAMUSCULAR; INTRAVENOUS ONCE
Status: COMPLETED | OUTPATIENT
Start: 2020-07-27 | End: 2020-07-27

## 2020-07-27 RX ADMIN — FUROSEMIDE 40 MG: 10 INJECTION, SOLUTION INTRAMUSCULAR; INTRAVENOUS at 22:19

## 2020-07-27 RX ADMIN — HYDRALAZINE HYDROCHLORIDE 10 MG: 20 INJECTION INTRAMUSCULAR; INTRAVENOUS at 22:17

## 2020-07-27 RX ADMIN — NITROGLYCERIN 1 PATCH: 0.4 PATCH TRANSDERMAL at 22:16

## 2020-07-27 ASSESSMENT — MIFFLIN-ST. JEOR: SCORE: 1355.78

## 2020-07-27 NOTE — ED PROVIDER NOTES
History     Chief Complaint:  Shortness of Breath      The history is provided by the patient and a relative. History limited by: hearing impairment.   History supplemented by electronic chart review as well as the patient's middle son present at bedside    Shiraz Ingram is a 85 year old male who presents for evaluation of shortness of breath that started this afternoon as he woke up from a nap.  He has a history complicated by a remote coronary artery bypass, followed by more recently community-acquired pneumonia leading to several day hospitalization in early July.  On July 14, he underwent transcatheter aortic valve replacement here with a bioprosthetic [employer] valve, complicated by complete heart block for which he received a pacemaker on July 15.  He was discharged home on Pradaxa, metoprolol, and lisinopril.  He had been recovering well at home.  He had a cough with a slight mucus 2 days ago, though that resolved very promptly.  No known COVID-19 exposures.  No chest pain.  Son thinks that his legs are slightly more swollen than baseline.  His blood pressure was in the 160s at home today, slightly higher than it has been.    Allergies:  No Known Drug Allergies    Medications:    Aspirin 81 mg  Budesonide   Dabigatran    Ferrous sulfate   Lisinopril   Meclizine   Metoprolol tartrate   Montelukast   Multivitamin   Nitroglycerin    Pantoprazole   Simvastatin      Past Medical History:    Acute on chronic congestive heart failure   Allergic rhinitis  Anemia  Aortic stenosis  Benign neoplasm of colon  Chronic kidney disease  Complete heart block   Coronary atherosclerosis  Hyperlipidemia  Hypertension  Localized osteoarthritis, lower leg  Moderate persistent asthma  Persistent atrial fibrillation  Pneumonia   Thrombus of atrial appendage   Unspecified hearing loss    Past Surgical History:    CABG  Colonoscopy   Colonoscopy through stoma w/ biopsy/cautery tumor/polyp/lesion x2  CV angiogram coronary graft  CV  "coronary angiogram  CV right heart cath  CV transcatheter aortic valve replacement  Esophagoscopy   Herniorrhaphy inguinal  Implant pacemaker  Laparoscopic cholecystectomy     Family History:    CABG  Cancer  Cerebrovascular disease  Coronary artery disease    Social History:  Marital Status:   [2]  Presents with his son  PCP: Dany Rodriguez MD  Negative for tobacco use. Former  Negative for smokeless tobacco use.  Positive for alcohol use.   Negative for drug use.       Review of Systems   Reason unable to perform ROS: Hearing impairment.   All other systems reviewed and are negative.    Physical Exam     Patient Vitals for the past 24 hrs:   BP Temp Temp src Pulse Resp SpO2 Height Weight   07/27/20 2217 (!) 193/80 -- -- -- -- -- -- --   07/27/20 2100 (!) 185/89 -- -- 55 -- 95 % -- --   07/27/20 2045 (!) 174/82 -- -- -- -- 96 % -- --   07/27/20 2030 -- -- -- -- -- 95 % -- --   07/27/20 2000 -- -- -- -- -- 96 % -- --   07/27/20 1930 -- -- -- -- -- 96 % -- --   07/27/20 1900 -- -- -- -- -- 93 % -- --   07/27/20 1825 (!) 196/87 98.2  F (36.8  C) Oral 62 20 95 % 1.702 m (5' 7\") 71.2 kg (157 lb)       Physical Exam  General: Nontoxic-appearing male sitting upright in room 10, son at bedside  HENT: mucous membranes moist  CV: rate as above, slightly irregular rhythm, mild symmetric lower extremity edema, left upper chest pacemaker nontender  Resp: Unlabored, no cough observed, no stridor  GI: abdomen soft and nontender, no guarding  MSK: no bony tenderness  Skin: appropriately warm and dry, no external evidence of infection to the pacemaker site  Neuro: alert, oriented, follows commands and answers questions appropriately via son's ASL, no nuchal rigidity, hearing impaired  Psych: cooperative      Emergency Department Course     ECG:  Indication: Shortness of breath   Time: 1835  Vent. Rate 64 bpm. NV interval *. QRS duration 138. QT/QTc 462/476. P-R-T axis * -49 2.  Atrial fibrillation. Right bundle " branch block. Left anterior fascicular block. Bifascicular block. Abnormal ECG. Read time: 1920      Imaging:  Radiology findings were communicated with the patient and family who voiced understanding of the findings.    XR Chest Port 1 view:  Bibasilar pleural effusions with consolidation and/or   atelectasis not excluded. These are mildly increased from prior. Mild   pulmonary vascular prominence. No pneumothorax. Unchanged cardiac   size. Median sternotomy. Aortic atherosclerosis. Left-sided pacer.   TAVR, as per radiology.      Laboratory:  Laboratory findings were communicated with the patient and family who voiced understanding of the findings.    CBC: WBC: 4.3, HGB: 10.7(L), PLT: 133(L)  BMP: Glucose 101(H), Chloride: 110(H), GFR: 46(L), o/w WNL (Creatinine: 1.37(H))    1906 Troponin: <0.015   D dimer: 1.7(H)  Nt probnp inpatient: 4,780(H)  Procalcitonin: <0.05 COVID-19 precautions maintained    Symptomatic COVID-19 Virus by PCR: In process    Interventions:  2216 Nitroglycerin, 0.4 mg/hr, patch  2217 Hydralazine, 10 mg, IV   2219 Lasix, 40 mg, IV    Emergency Department Course:  Past medical records, nursing notes, and vitals reviewed.    1910 I performed an exam of the patient as documented above.     EKG obtained in the ED, see results above.   IV was inserted and blood was drawn for laboratory testing, results above.  The patient's nose was swabbed and this sample was sent for COVID-19 antigen, findings above.   The patient was sent for a chest x-ray while in the emergency department, results above.     2145 I spoke with the patients son over the phone about the results of his workup.  2206 I consulted with Dr. Osborne of the hospitalist services who is in agreement to accept the patient for admission.     Findings and plan explained to the Patient and son who consents to admission. Discussed the patient with Dr. Osborne, who will admit the patient to an inpatient tele bed for further monitoring,  evaluation, and treatment.    I personally reviewed the laboratory and imaging results with the Patient and answered all related questions prior to admission.     Impression & Plan     Covid-19  Shiraz Ingram was evaluated during a global COVID-19 pandemic, which necessitated consideration that the patient might be at risk for infection with the SARS-CoV-2 virus that causes COVID-19.   Applicable protocols for evaluation were followed during the patient's care.   COVID-19 was considered as part of the patient's evaluation. The plan for testing is:  a test was obtained during this visit.    Medical Decision Making:  I think his symptoms are best explained by volume overload in the setting of recent cardiac procedures.  This is supported by his bilateral leg swelling, elevated BNP, and chest x-ray findings.  He is not in respiratory distress, though his son does not feel comfortable with him being discharged for outpatient management.  Pulmonary embolism was considered, though thought to be less likely in light of an alternate confirmed diagnosis as well as reported strict compliance with his anticoagulant, so I felt that deferring CT imaging, despite a screening d-dimer that is elevated (sent prior to other test results), was in his best interest, especially in light of somewhat poor renal function.  No signs of dangerous arrhythmia at this time.  Infection considered but thought to be less likely.  IV diuresis was initiated, and IV hydralazine and a nitroglycerin patch ordered after discussion with the accepting hospitalist.  COVID-19 precautions maintained while test is pending though clinical suspicion is not high.      Diagnosis:    ICD-10-CM    1. Dyspnea, unspecified type  R06.00 CBC with platelets differential   2. Hypervolemia, unspecified hypervolemia type  E87.70    3. History of aortic valve replacement  Z95.2    4. Long term current use of anticoagulant therapy  Z79.01    5. Renal insufficiency  N28.9     6. Pacemaker  Z95.0    7. Hypertension, unspecified type  I10        Disposition:  Admitted to an inpatient tele bed under the care of Dr. Osborne.    Scribe Disclosure:  I, Britni Shannancandido, am serving as a scribe at 7:10 PM on 7/27/2020 to document services personally performed by Guicho Brand MD based on my observations and the provider's statements to me.     This note was completed in part using Dragon voice recognition software. Although reviewed after completion, some word and grammatical errors may occur.       EMERGENCY DEPARTMENT       Guicho Brand MD  07/28/20 0031

## 2020-07-27 NOTE — ED TRIAGE NOTES
Post op 2 weeks for valve replacement. Woke up from nap this afternoon with SOB . Had a cough 2 days ago and coughed up some mucus and denies any cough since then.

## 2020-07-28 ENCOUNTER — TELEPHONE (OUTPATIENT)
Dept: CARDIOLOGY | Facility: CLINIC | Age: 85
End: 2020-07-28

## 2020-07-28 ENCOUNTER — APPOINTMENT (OUTPATIENT)
Dept: CARDIOLOGY | Facility: CLINIC | Age: 85
DRG: 291 | End: 2020-07-28
Attending: INTERNAL MEDICINE
Payer: COMMERCIAL

## 2020-07-28 ENCOUNTER — OFFICE VISIT (OUTPATIENT)
Dept: INTERPRETER SERVICES | Facility: CLINIC | Age: 85
End: 2020-07-28
Payer: COMMERCIAL

## 2020-07-28 LAB
ANION GAP SERPL CALCULATED.3IONS-SCNC: 8 MMOL/L (ref 3–14)
BUN SERPL-MCNC: 26 MG/DL (ref 7–30)
CALCIUM SERPL-MCNC: 9.2 MG/DL (ref 8.5–10.1)
CHLORIDE SERPL-SCNC: 105 MMOL/L (ref 94–109)
CO2 SERPL-SCNC: 26 MMOL/L (ref 20–32)
CREAT SERPL-MCNC: 1.33 MG/DL (ref 0.66–1.25)
ERYTHROCYTE [DISTWIDTH] IN BLOOD BY AUTOMATED COUNT: 18.6 % (ref 10–15)
GFR SERPL CREATININE-BSD FRML MDRD: 48 ML/MIN/{1.73_M2}
GLUCOSE SERPL-MCNC: 92 MG/DL (ref 70–99)
HCT VFR BLD AUTO: 30.7 % (ref 40–53)
HGB BLD-MCNC: 9.8 G/DL (ref 13.3–17.7)
LABORATORY COMMENT REPORT: NORMAL
MCH RBC QN AUTO: 22.2 PG (ref 26.5–33)
MCHC RBC AUTO-ENTMCNC: 31.9 G/DL (ref 31.5–36.5)
MCV RBC AUTO: 70 FL (ref 78–100)
PLATELET # BLD AUTO: 120 10E9/L (ref 150–450)
POTASSIUM SERPL-SCNC: 3.7 MMOL/L (ref 3.4–5.3)
RBC # BLD AUTO: 4.42 10E12/L (ref 4.4–5.9)
SARS-COV-2 RNA SPEC QL NAA+PROBE: NEGATIVE
SODIUM SERPL-SCNC: 139 MMOL/L (ref 133–144)
SPECIMEN SOURCE: NORMAL
TROPONIN I SERPL-MCNC: 0.04 UG/L (ref 0–0.04)
TROPONIN I SERPL-MCNC: 0.05 UG/L (ref 0–0.04)
WBC # BLD AUTO: 4.4 10E9/L (ref 4–11)

## 2020-07-28 PROCEDURE — 93005 ELECTROCARDIOGRAM TRACING: CPT

## 2020-07-28 PROCEDURE — 25000128 H RX IP 250 OP 636: Performed by: NURSE PRACTITIONER

## 2020-07-28 PROCEDURE — 93321 DOPPLER ECHO F-UP/LMTD STD: CPT | Mod: 26 | Performed by: INTERNAL MEDICINE

## 2020-07-28 PROCEDURE — 25000125 ZZHC RX 250: Performed by: INTERNAL MEDICINE

## 2020-07-28 PROCEDURE — 25000132 ZZH RX MED GY IP 250 OP 250 PS 637: Performed by: INTERNAL MEDICINE

## 2020-07-28 PROCEDURE — 99223 1ST HOSP IP/OBS HIGH 75: CPT | Mod: AI | Performed by: INTERNAL MEDICINE

## 2020-07-28 PROCEDURE — 36415 COLL VENOUS BLD VENIPUNCTURE: CPT | Performed by: INTERNAL MEDICINE

## 2020-07-28 PROCEDURE — 93325 DOPPLER ECHO COLOR FLOW MAPG: CPT | Mod: 26 | Performed by: INTERNAL MEDICINE

## 2020-07-28 PROCEDURE — 93321 DOPPLER ECHO F-UP/LMTD STD: CPT

## 2020-07-28 PROCEDURE — 84484 ASSAY OF TROPONIN QUANT: CPT | Performed by: INTERNAL MEDICINE

## 2020-07-28 PROCEDURE — 80048 BASIC METABOLIC PNL TOTAL CA: CPT | Performed by: INTERNAL MEDICINE

## 2020-07-28 PROCEDURE — 21000001 ZZH R&B HEART CARE

## 2020-07-28 PROCEDURE — T1013 SIGN LANG/ORAL INTERPRETER: HCPCS | Mod: U3

## 2020-07-28 PROCEDURE — 94640 AIRWAY INHALATION TREATMENT: CPT

## 2020-07-28 PROCEDURE — 93308 TTE F-UP OR LMTD: CPT | Mod: 26 | Performed by: INTERNAL MEDICINE

## 2020-07-28 PROCEDURE — 94640 AIRWAY INHALATION TREATMENT: CPT | Mod: 76

## 2020-07-28 PROCEDURE — 25000128 H RX IP 250 OP 636: Performed by: INTERNAL MEDICINE

## 2020-07-28 PROCEDURE — 85027 COMPLETE CBC AUTOMATED: CPT | Performed by: INTERNAL MEDICINE

## 2020-07-28 PROCEDURE — 99223 1ST HOSP IP/OBS HIGH 75: CPT | Mod: 25 | Performed by: INTERNAL MEDICINE

## 2020-07-28 PROCEDURE — 40000275 ZZH STATISTIC RCP TIME EA 10 MIN

## 2020-07-28 RX ORDER — PROCHLORPERAZINE 25 MG
12.5 SUPPOSITORY, RECTAL RECTAL EVERY 12 HOURS PRN
Status: DISCONTINUED | OUTPATIENT
Start: 2020-07-28 | End: 2020-07-29 | Stop reason: HOSPADM

## 2020-07-28 RX ORDER — ACETAMINOPHEN 325 MG/1
650 TABLET ORAL EVERY 4 HOURS PRN
Status: DISCONTINUED | OUTPATIENT
Start: 2020-07-28 | End: 2020-07-29 | Stop reason: HOSPADM

## 2020-07-28 RX ORDER — AMOXICILLIN 250 MG
1 CAPSULE ORAL 2 TIMES DAILY PRN
Status: DISCONTINUED | OUTPATIENT
Start: 2020-07-28 | End: 2020-07-29 | Stop reason: HOSPADM

## 2020-07-28 RX ORDER — SIMVASTATIN 40 MG
40 TABLET ORAL AT BEDTIME
Status: DISCONTINUED | OUTPATIENT
Start: 2020-07-28 | End: 2020-07-29 | Stop reason: HOSPADM

## 2020-07-28 RX ORDER — NITROGLYCERIN 80 MG/1
1 PATCH TRANSDERMAL DAILY
Status: DISCONTINUED | OUTPATIENT
Start: 2020-07-28 | End: 2020-07-29 | Stop reason: HOSPADM

## 2020-07-28 RX ORDER — FUROSEMIDE 10 MG/ML
40 INJECTION INTRAMUSCULAR; INTRAVENOUS
Status: DISCONTINUED | OUTPATIENT
Start: 2020-07-28 | End: 2020-07-28

## 2020-07-28 RX ORDER — LISINOPRIL 20 MG/1
20 TABLET ORAL DAILY
Status: DISCONTINUED | OUTPATIENT
Start: 2020-07-28 | End: 2020-07-29 | Stop reason: HOSPADM

## 2020-07-28 RX ORDER — MONTELUKAST SODIUM 10 MG/1
10 TABLET ORAL DAILY
Status: DISCONTINUED | OUTPATIENT
Start: 2020-07-28 | End: 2020-07-29 | Stop reason: HOSPADM

## 2020-07-28 RX ORDER — ACETAMINOPHEN 650 MG/1
650 SUPPOSITORY RECTAL EVERY 4 HOURS PRN
Status: DISCONTINUED | OUTPATIENT
Start: 2020-07-28 | End: 2020-07-29 | Stop reason: HOSPADM

## 2020-07-28 RX ORDER — METOPROLOL TARTRATE 25 MG/1
25 TABLET, FILM COATED ORAL 2 TIMES DAILY
Status: DISCONTINUED | OUTPATIENT
Start: 2020-07-28 | End: 2020-07-29 | Stop reason: HOSPADM

## 2020-07-28 RX ORDER — ALBUTEROL SULFATE 90 UG/1
2 AEROSOL, METERED RESPIRATORY (INHALATION) 4 TIMES DAILY PRN
Status: DISCONTINUED | OUTPATIENT
Start: 2020-07-28 | End: 2020-07-29 | Stop reason: HOSPADM

## 2020-07-28 RX ORDER — BISACODYL 10 MG
10 SUPPOSITORY, RECTAL RECTAL DAILY PRN
Status: DISCONTINUED | OUTPATIENT
Start: 2020-07-28 | End: 2020-07-29 | Stop reason: HOSPADM

## 2020-07-28 RX ORDER — ALUMINA, MAGNESIA, AND SIMETHICONE 2400; 2400; 240 MG/30ML; MG/30ML; MG/30ML
30 SUSPENSION ORAL EVERY 4 HOURS PRN
Status: DISCONTINUED | OUTPATIENT
Start: 2020-07-28 | End: 2020-07-29 | Stop reason: HOSPADM

## 2020-07-28 RX ORDER — MECLIZINE HCL 12.5 MG 12.5 MG/1
12.5 TABLET ORAL 3 TIMES DAILY PRN
Status: DISCONTINUED | OUTPATIENT
Start: 2020-07-28 | End: 2020-07-29 | Stop reason: HOSPADM

## 2020-07-28 RX ORDER — IPRATROPIUM BROMIDE AND ALBUTEROL SULFATE 2.5; .5 MG/3ML; MG/3ML
3 SOLUTION RESPIRATORY (INHALATION)
Status: DISCONTINUED | OUTPATIENT
Start: 2020-07-28 | End: 2020-07-29 | Stop reason: HOSPADM

## 2020-07-28 RX ORDER — ASPIRIN 81 MG/1
81 TABLET ORAL DAILY
Status: DISCONTINUED | OUTPATIENT
Start: 2020-07-28 | End: 2020-07-29 | Stop reason: HOSPADM

## 2020-07-28 RX ORDER — ONDANSETRON 2 MG/ML
4 INJECTION INTRAMUSCULAR; INTRAVENOUS EVERY 6 HOURS PRN
Status: DISCONTINUED | OUTPATIENT
Start: 2020-07-28 | End: 2020-07-29 | Stop reason: HOSPADM

## 2020-07-28 RX ORDER — NITROGLYCERIN 0.4 MG/1
0.4 TABLET SUBLINGUAL EVERY 5 MIN PRN
Status: DISCONTINUED | OUTPATIENT
Start: 2020-07-28 | End: 2020-07-29 | Stop reason: HOSPADM

## 2020-07-28 RX ORDER — PROCHLORPERAZINE MALEATE 5 MG
5 TABLET ORAL EVERY 6 HOURS PRN
Status: DISCONTINUED | OUTPATIENT
Start: 2020-07-28 | End: 2020-07-29 | Stop reason: HOSPADM

## 2020-07-28 RX ORDER — LANOLIN ALCOHOL/MO/W.PET/CERES
3 CREAM (GRAM) TOPICAL
Status: DISCONTINUED | OUTPATIENT
Start: 2020-07-28 | End: 2020-07-29 | Stop reason: HOSPADM

## 2020-07-28 RX ORDER — LIDOCAINE 40 MG/G
CREAM TOPICAL
Status: DISCONTINUED | OUTPATIENT
Start: 2020-07-28 | End: 2020-07-29 | Stop reason: HOSPADM

## 2020-07-28 RX ORDER — BUDESONIDE 0.5 MG/2ML
0.5 INHALANT ORAL 2 TIMES DAILY
Status: DISCONTINUED | OUTPATIENT
Start: 2020-07-28 | End: 2020-07-29 | Stop reason: HOSPADM

## 2020-07-28 RX ORDER — DABIGATRAN ETEXILATE 150 MG/1
150 CAPSULE ORAL 2 TIMES DAILY
Status: DISCONTINUED | OUTPATIENT
Start: 2020-07-28 | End: 2020-07-29 | Stop reason: HOSPADM

## 2020-07-28 RX ORDER — FERROUS SULFATE 325(65) MG
325 TABLET ORAL DAILY
Status: DISCONTINUED | OUTPATIENT
Start: 2020-07-28 | End: 2020-07-29 | Stop reason: HOSPADM

## 2020-07-28 RX ORDER — FUROSEMIDE 10 MG/ML
40 INJECTION INTRAMUSCULAR; INTRAVENOUS ONCE
Status: COMPLETED | OUTPATIENT
Start: 2020-07-28 | End: 2020-07-28

## 2020-07-28 RX ORDER — ONDANSETRON 4 MG/1
4 TABLET, ORALLY DISINTEGRATING ORAL EVERY 6 HOURS PRN
Status: DISCONTINUED | OUTPATIENT
Start: 2020-07-28 | End: 2020-07-29 | Stop reason: HOSPADM

## 2020-07-28 RX ORDER — POLYETHYLENE GLYCOL 3350 17 G/17G
17 POWDER, FOR SOLUTION ORAL DAILY PRN
Status: DISCONTINUED | OUTPATIENT
Start: 2020-07-28 | End: 2020-07-29 | Stop reason: HOSPADM

## 2020-07-28 RX ORDER — AMOXICILLIN 250 MG
2 CAPSULE ORAL 2 TIMES DAILY PRN
Status: DISCONTINUED | OUTPATIENT
Start: 2020-07-28 | End: 2020-07-29 | Stop reason: HOSPADM

## 2020-07-28 RX ADMIN — IPRATROPIUM BROMIDE AND ALBUTEROL SULFATE 3 ML: .5; 3 SOLUTION RESPIRATORY (INHALATION) at 15:01

## 2020-07-28 RX ADMIN — DABIGATRAN ETEXILATE MESYLATE 150 MG: 150 CAPSULE ORAL at 08:18

## 2020-07-28 RX ADMIN — NITROGLYCERIN 1 PATCH: 0.4 PATCH TRANSDERMAL at 08:24

## 2020-07-28 RX ADMIN — MONTELUKAST 10 MG: 10 TABLET, FILM COATED ORAL at 08:18

## 2020-07-28 RX ADMIN — METOPROLOL TARTRATE 25 MG: 25 TABLET, FILM COATED ORAL at 19:49

## 2020-07-28 RX ADMIN — BUDESONIDE 0.5 MG: 0.5 INHALANT RESPIRATORY (INHALATION) at 08:56

## 2020-07-28 RX ADMIN — MELATONIN TAB 3 MG 3 MG: 3 TAB at 22:47

## 2020-07-28 RX ADMIN — LISINOPRIL 20 MG: 20 TABLET ORAL at 08:18

## 2020-07-28 RX ADMIN — IPRATROPIUM BROMIDE AND ALBUTEROL SULFATE 3 ML: .5; 3 SOLUTION RESPIRATORY (INHALATION) at 19:31

## 2020-07-28 RX ADMIN — FUROSEMIDE 40 MG: 40 INJECTION, SOLUTION INTRAMUSCULAR; INTRAVENOUS at 06:49

## 2020-07-28 RX ADMIN — BUDESONIDE 0.5 MG: 0.5 INHALANT RESPIRATORY (INHALATION) at 19:31

## 2020-07-28 RX ADMIN — SIMVASTATIN 40 MG: 40 TABLET, FILM COATED ORAL at 01:53

## 2020-07-28 RX ADMIN — METOPROLOL TARTRATE 25 MG: 25 TABLET, FILM COATED ORAL at 08:18

## 2020-07-28 RX ADMIN — SIMVASTATIN 40 MG: 40 TABLET, FILM COATED ORAL at 19:49

## 2020-07-28 RX ADMIN — ASPIRIN 81 MG: 81 TABLET, COATED ORAL at 08:18

## 2020-07-28 RX ADMIN — FERROUS SULFATE TAB 325 MG (65 MG ELEMENTAL FE) 325 MG: 325 (65 FE) TAB at 08:18

## 2020-07-28 RX ADMIN — FUROSEMIDE 40 MG: 40 INJECTION, SOLUTION INTRAMUSCULAR; INTRAVENOUS at 08:23

## 2020-07-28 RX ADMIN — IPRATROPIUM BROMIDE AND ALBUTEROL SULFATE 3 ML: .5; 3 SOLUTION RESPIRATORY (INHALATION) at 12:11

## 2020-07-28 RX ADMIN — NITROGLYCERIN 0.4 MG: 0.4 TABLET SUBLINGUAL at 06:29

## 2020-07-28 RX ADMIN — DABIGATRAN ETEXILATE MESYLATE 150 MG: 150 CAPSULE ORAL at 19:49

## 2020-07-28 ASSESSMENT — ACTIVITIES OF DAILY LIVING (ADL)
ADLS_ACUITY_SCORE: 15
COGNITION: 0 - NO COGNITION ISSUES REPORTED
FALL_HISTORY_WITHIN_LAST_SIX_MONTHS: NO
SWALLOWING: 0-->SWALLOWS FOODS/LIQUIDS WITHOUT DIFFICULTY
ADLS_ACUITY_SCORE: 15
TRANSFERRING: 0-->INDEPENDENT
ADLS_ACUITY_SCORE: 14
RETIRED_COMMUNICATION: 0-->UNDERSTANDS/COMMUNICATES WITHOUT DIFFICULTY
ADLS_ACUITY_SCORE: 15
TOILETING: 0-->INDEPENDENT
DRESS: 0-->INDEPENDENT
AMBULATION: 0-->INDEPENDENT
RETIRED_EATING: 0-->INDEPENDENT
BATHING: 0-->INDEPENDENT
ADLS_ACUITY_SCORE: 15

## 2020-07-28 ASSESSMENT — MIFFLIN-ST. JEOR: SCORE: 1370.29

## 2020-07-28 NOTE — PHARMACY-ADMISSION MEDICATION HISTORY
Pharmacy Medication History  Admission medication history interview status for the 7/27/2020  admission is complete. See EPIC admission navigator for prior to admission medications     Medication history sources: Patient and Patient's family/friend (patient's son (Forrest))  Medication history source reliability: Good  Adherence assessment: Good    Significant changes made to the medication list:  Protonix removed from medication list.       Additional medication history information:   On day of admission patient has taken all medications except for bedtime dose of Simvastatin.  Patient states he took both doses of Pradaxa and Metoprolol today prior to coming to the hospital.     Medication reconciliation completed by provider prior to medication history? Yes    Time spent in this activity: 20 minutes.       Prior to Admission medications    Medication Sig Last Dose Taking? Auth Provider   albuterol (PROAIR HFA/PROVENTIL HFA/VENTOLIN HFA) 108 (90 Base) MCG/ACT inhaler Inhale 2 puffs into the lungs 4 times daily as needed for shortness of breath / dyspnea or wheezing Past Week at Unknown time Yes Reported, Patient   aspirin (ASA) 81 MG EC tablet Take 81 mg by mouth daily 7/27/2020 at AM Yes Reported, Patient   budesonide (PULMICORT) 0.5 MG/2ML nebulizer solution Take 2 mLs (0.5 mg) by nebulization 2 times daily Past Week at Unknown time Yes Dany Rodriguez MD   calcium carbonate 600 mg-vitamin D 400 units (CALTRATE) 600-400 MG-UNIT per tablet Take 1 tablet by mouth daily 7/27/2020 at AM Yes Unknown, Entered By History   dabigatran ANTICOAGULANT (PRADAXA) 150 MG capsule Take 1 capsule (150 mg) by mouth 2 times daily Store in original 's bottle or blister pack; use within 120 days of opening. 7/27/2020 at PM Yes Ryan Samuel MD   ferrous sulfate (IRON) 325 (65 Fe) MG tablet TAKE 1 TABLET(325 MG) BY MOUTH TWICE DAILY 7/27/2020 at Unknown time Yes Dany Rodriguez MD   ipratropium -  albuterol 0.5 mg/2.5 mg/3 mL (DUONEB) 0.5-2.5 (3) MG/3ML nebulization Inhale 1 vial (3 mLs) into the lungs 4 times daily Past Month at Unknown time Yes Dany Rodriguez MD   lisinopril (ZESTRIL) 20 MG tablet Take 20 mg by mouth daily  7/27/2020 at AM Yes Adam Mcgarry PA-C   metoprolol tartrate (LOPRESSOR) 25 MG tablet Take 1 tablet (25 mg) by mouth 2 times daily 7/27/2020 at PM Yes Bozena Groves MD   montelukast (SINGULAIR) 10 MG tablet TAKE 1 TABLET(10 MG) BY MOUTH DAILY 7/27/2020 at AM Yes Dany Rodriguez MD   multivitamin, therapeutic (THERA-VIT) TABS tablet Take 1 tablet by mouth every evening  7/27/2020 at AM Yes Unknown, Entered By History   simvastatin (ZOCOR) 40 MG tablet TAKE 1 TABLET(40 MG) BY MOUTH AT BEDTIME 7/26/2020 at HS Yes Dany Rodriguez MD   meclizine (ANTIVERT) 12.5 MG tablet Take 12.5 mg by mouth 3 times daily as needed for dizziness More than a month at Unknown time  Reported, Patient   nitroGLYcerin (NITROSTAT) 0.4 MG sublingual tablet For chest pain place 1 tablet under the tongue every 5 minutes for 3 doses. If symptoms persist 5 minutes after 1st dose call 911. Unknown at Unknown time  Bozena Groves MD

## 2020-07-28 NOTE — TELEPHONE ENCOUNTER
Willi called to let us know he took his dad to the ER last night for shortness of breath and lower extremity swelling. He just wanted us to be aware. I informed him we would follow along the chart and I would update the TAVR team. He verbalized understanding.  Stephanie Ontiveros RN  [facility]

## 2020-07-28 NOTE — ED NOTES
[facility]  ED Nurse Handoff Report    ED Chief complaint: Shortness of Breath      ED Diagnosis:   Final diagnoses:   Dyspnea, unspecified type   Hypervolemia, unspecified hypervolemia type   History of aortic valve replacement   Long term current use of anticoagulant therapy   Renal insufficiency   Pacemaker   Hypertension, unspecified type       Code Status: Admitting provider to address.     Allergies:   Allergies   Allergen Reactions     No Known Drug Allergies        Patient Story: Pt come from home  due to new onset of  shortness of breath of breath that started today asfter a nap. High blood pressure.   Focused Assessment:  Pt is awake, alert and orientated X 4. Pleasant.     Treatments and/or interventions provided: Labs, IV medications and nitroglycerin patch   Patient's response to treatments and/or interventions: Tolerated well.     To be done/followed up on inpatient unit:      Does this patient have any cognitive concerns?: Pt is hard of hearing. Son in room to assist with communication, Pt is awake, alert, orintated X 4.     Activity level - Baseline/Home:  Independent  Activity Level - Current:   Independent    Patient's Preferred language: American Sign Language   Needed?: Yes. Sign language via [employer].     Isolation: Other: Covid Precaution  Infection: Covid Pending   Bariatric?: No    Vital Signs:   Vitals:    07/27/20 2000 07/27/20 2030 07/27/20 2045 07/27/20 2100   BP:   (!) 174/82 (!) 185/89   Pulse:    55   Resp:       Temp:       TempSrc:       SpO2: 96% 95% 96% 95%   Weight:       Height:           Cardiac Rhythm:     Was the PSS-3 completed:   Yes  What interventions are required if any?               Family Comments: Son at bedside, assisted in translating and assisting to express his concerns. Pt is hard of hearing.   OBS brochure/video discussed/provided to patient/family: Yes              Name of person given brochure if not patient:                Relationship to patient: Son    For the majority of the shift this patient's behavior was Green.   Behavioral interventions performed were None.    ED NURSE PHONE NUMBER: [phone]

## 2020-07-28 NOTE — PROGRESS NOTES
Pt transferred from OBS at 1600. Pt is A&Ox4, VSS on RA. Denies pain/ chest pain/ SOB. 2g Na diet, no caffeine. Pt is IND. Nitroglycerin patch to L arm. On oral lasix, good urine o/p.     **Pt uses ASL to communicate**

## 2020-07-28 NOTE — PROGRESS NOTES
X cover    Trop mildly elevated, suspect from decompensated CHF, trend troponin      Sammy Osborne MD

## 2020-07-28 NOTE — CONSULTS
[facility]    Cardiology Consultation     Date of Admission:  7/27/2020    Assessment & Plan   Shiraz Ingram is a 85 year old male who was admitted on 7/27/2020.    1. Acute decompensated heart failure in the setting of mild heart failure with preserved ejection fraction  2. Uncontrolled hypertension  3. Recent transcatheter aortic valve replacement for severe aortic stenosis  4. Complete heart block following TAVR status post dual-chamber pacemaker  5. Chronic kidney disease  6. Anemia  7. History of left atrial appendage thrombus and atrial fibrillation on chronic anticoagulation with Pradaxa  8. History of GI bleeding    Mr. Ingram seems to be doing okay overall from a cardiac standpoint.  He has had excellent diuresis and he says his edema is completely gone.  He is on room air and is feeling better already.  Given the fact that he is elderly has CKD and is on dabigatran with a prior history of GI bleed I would be cautious in this situation to over diurese him because we would not want to end up with acute kidney injury.  This would increase his bleeding risk.  As such I would recommend stopping diuresis at this time.  Reevaluate kidney function tomorrow morning and would recommend initiating PO diuretics tomorrow. He does not use lasix at baseline at home and I think starting at 40 mg daily with one week lab follow up to ensure creatinine stability may be reasonable. He may need a sliding scale at discharge.     Etiology of this presentation is likely related to uncontrolled hypertension.  Again at home he takes metoprolol 25 twice daily and lisinopril 20 mg.  For the reasons mentioned above, little hesitant about increasing the lisinopril.  If needed amlodipine could be added to his regimen tomorrow (will have to back off on simvastatin if you do that) depending on his blood pressure.  Primary team has ordered an echo, which is currently pending.     Cardiology will continue to follow tomorrow  morning.  Thank you for the consultation.  He will need close outpatient cardiology follow-up.  Dr. Mancini and his team will kindly follow along in the am tomorrow.     Gustavo Tierney MD    Primary Care Physician   Dany Rodriguez MD    Reason for Consult   Reason for consult: I was asked by medicine team to evaluate this patient for acute decompensated heart failure.    History of Present Illness   Shiraz Ingram is a 85 year old male who presents with SOB.  This is an 85-year-old male patient with a past medical history of coronary artery disease status post bypass surgery with LIMA to LAD vein graft to OM and right coronary arteries.  He has a history of permanent atrial fibrillation and anticoagulation.  It appears that on Xarelto he developed a left atrial appendage thrombus and earlier this year was switched to dabigatran.  He has permanent hearing loss chronic anemia and pancytopenia.  His EF is normal.  In summer of this year he was noted to have progressive severe aortic stenosis for which he underwent transcatheter aortic valve replacement on 7/14/2020.  This was done using a 29 mm Medtronic evolute pro plus valve.  Post TAVR his EF was normal.  Trace PVL was noted with a mean gradient of 5.  Unfortunately he ended up having complete heart block and symptomatic bradycardia and subsequently underwent dual-chamber pacemaker implantation during that admission.  Most recent device check shows 19% ventricular pacing.  In addition to this the patient has a history of hypertension.  He takes metoprolol and lisinopril at home.  He does have mild heart failure with preserved ejection fraction for which he takes 40 mg of Lasix at home. He has mild CKD as well.     The patient is very hard of hearing and infected to write down everything on paper to show him my questions.  However he is very conversive.  He lives in his home and he walks about 3 times a day without any significant limitations.  However over the  last couple of days he is been feeling short of breath he is also been experiencing lower extremity edema.  He attributes to good medication compliance and has not missed any doses of his lisinopril beta-blockers or Lasix.  On arrival he was noted to have vascular congestion lower extremity edema and elevated NT proBNP level.  Since last night until this morning he is received a total of 3 doses of 40 mg of IV Lasix.  Cardiology is been consulted.  He seems to achieve excellent diuresis.  His edema is gone is currently on room air.    Patient Active Problem List   Diagnosis     Coronary atherosclerosis     Benign neoplasm of colon     Localized osteoarthrosis, lower leg     Allergic rhinitis     Moderate persistent asthma     Anemia     Osteoporosis     Hypertension goal BP (blood pressure) < 140/90     Hyperlipidemia with target LDL less than 100     CKD (chronic kidney disease) stage 3, GFR 30-59 ml/min (H)     Nonrheumatic aortic valve stenosis     Advanced directives, counseling/discussion     Iron deficiency anemia     Vitamin B 12 deficiency     Health Care Home     Mild persistent asthma without complication     Acute respiratory failure (H)     Acute on chronic congestive heart failure, unspecified heart failure type (H)     Severe aortic stenosis     Coronary artery disease involving native coronary artery of native heart without angina pectoris     S/P CABG (coronary artery bypass graft)     Hyperkalemia     GI bleed     Pneumonia     Atrial fibrillation (H)     Thrombus of left atrial appendage     Complete heart block (H)     Aortic stenosis, severe     Acute exacerbation of CHF (congestive heart failure) (H)       Past Medical History   I have reviewed this patient's medical history and updated it with pertinent information if needed.   Past Medical History:   Diagnosis Date     Allergic rhinitis, cause unspecified      Anemia, unspecified      Aortic stenosis     severe     Benign neoplasm of colon  1999    Ademonatous polyp     CKD (chronic kidney disease) stage 3, GFR 30-59 ml/min (H) 5/12/2011     Coronary atherosclerosis of unspecified type of vessel, native or graft     s/p CABG 2002     Hyperlipidemia LDL goal < 100      Hypertension goal BP (blood pressure) < 140/90      Localized osteoarthrosis not specified whether primary or secondary, lower leg     left knee     Moderate persistent asthma      Persistent atrial fibrillation (H)      Unspecified hearing loss        Past Surgical History   I have reviewed this patient's surgical history and updated it with pertinent information if needed.  Past Surgical History:   Procedure Laterality Date     C NONSPECIFIC PROCEDURE  5/02    Coronary artery bypass     CARDIAC SURGERY       COLONOSCOPY N/A 5/26/2020    Procedure: COLONOSCOPY;  Surgeon: Ignacio Fournier MD;  Location:  GI     CV ANGIOGRAM CORONARY GRAFT N/A 4/24/2020    Procedure: Angiogram Coronary Graft;  Surgeon: Addison Willard MD;  Location:  HEART CARDIAC CATH LAB     CV CORONARY ANGIOGRAM N/A 4/24/2020    Procedure: Coronary Angiogram;  Surgeon: Addison Willard MD;  Location:  HEART CARDIAC CATH LAB     CV RIGHT HEART CATH N/A 4/24/2020    Procedure: Right Heart Cath;  Surgeon: Addison Willard MD;  Location:  HEART CARDIAC CATH LAB     CV TRANSCATHETER AORTIC VALVE REPLACEMENT N/A 7/14/2020    Procedure: Transcatheter Aortic Valve Replacement;  Surgeon: Soraida Monet MD;  Location:  HEART CARDIAC CATH LAB     EP PACEMAKER N/A 7/15/2020    Procedure: EP Pacemaker;  Surgeon: Tommie Sauer MD;  Location:  HEART CARDIAC CATH LAB     HC COLONOSCOPY THRU STOMA W BIOPSY/CAUTERY TUMOR/POLYP/LESION  5/03    Dr. Dow, Q 5 years     HC COLONOSCOPY THRU STOMA W BIOPSY/CAUTERY TUMOR/POLYP/LESION  12/199    Dr. Dow, one adenomatous polyp     HC ESOPHAGOSCOPY, DIAGNOSTIC  5/03    Dr. Dow, lower esophageal ring & mild gastritis     HERNIORRHAPHY  INGUINAL Right 9/26/2016    Procedure: HERNIORRHAPHY INGUINAL;  Surgeon: Alexis Alexandra MD;  Location: [facility]     LAPAROSCOPIC CHOLECYSTECTOMY  12/03    for biliary sludge       Prior to Admission Medications   Prior to Admission Medications   Prescriptions Last Dose Informant Patient Reported? Taking?   albuterol (PROAIR HFA/PROVENTIL HFA/VENTOLIN HFA) 108 (90 Base) MCG/ACT inhaler Past Week at Unknown time Self Yes Yes   Sig: Inhale 2 puffs into the lungs 4 times daily as needed for shortness of breath / dyspnea or wheezing   aspirin (ASA) 81 MG EC tablet 7/27/2020 at AM Self Yes Yes   Sig: Take 81 mg by mouth daily   budesonide (PULMICORT) 0.5 MG/2ML nebulizer solution Past Week at Unknown time Self No Yes   Sig: Take 2 mLs (0.5 mg) by nebulization 2 times daily   calcium carbonate 600 mg-vitamin D 400 units (CALTRATE) 600-400 MG-UNIT per tablet 7/27/2020 at AM Self Yes Yes   Sig: Take 1 tablet by mouth daily   dabigatran ANTICOAGULANT (PRADAXA) 150 MG capsule 7/27/2020 at PM Self No Yes   Sig: Take 1 capsule (150 mg) by mouth 2 times daily Store in original 's bottle or blister pack; use within 120 days of opening.   ferrous sulfate (IRON) 325 (65 Fe) MG tablet 7/27/2020 at Unknown time Self No Yes   Sig: TAKE 1 TABLET(325 MG) BY MOUTH TWICE DAILY   ipratropium - albuterol 0.5 mg/2.5 mg/3 mL (DUONEB) 0.5-2.5 (3) MG/3ML nebulization Past Month at Unknown time Self No Yes   Sig: Inhale 1 vial (3 mLs) into the lungs 4 times daily   lisinopril (ZESTRIL) 20 MG tablet 7/27/2020 at AM  Yes Yes   Sig: Take 20 mg by mouth daily    meclizine (ANTIVERT) 12.5 MG tablet More than a month at Unknown time Self Yes No   Sig: Take 12.5 mg by mouth 3 times daily as needed for dizziness   metoprolol tartrate (LOPRESSOR) 25 MG tablet 7/27/2020 at PM  No Yes   Sig: Take 1 tablet (25 mg) by mouth 2 times daily   montelukast (SINGULAIR) 10 MG tablet 7/27/2020 at AM Self No Yes   Sig: TAKE 1 TABLET(10 MG) BY MOUTH  DAILY   multivitamin, therapeutic (THERA-VIT) TABS tablet 2020 at AM Self Yes Yes   Sig: Take 1 tablet by mouth every evening    nitroGLYcerin (NITROSTAT) 0.4 MG sublingual tablet Unknown at Unknown time  No No   Sig: For chest pain place 1 tablet under the tongue every 5 minutes for 3 doses. If symptoms persist 5 minutes after 1st dose call 911.   simvastatin (ZOCOR) 40 MG tablet 2020 at HS Self No Yes   Sig: TAKE 1 TABLET(40 MG) BY MOUTH AT BEDTIME      Facility-Administered Medications: None     Current Facility-Administered Medications   Medication Dose Route Frequency     aspirin  81 mg Oral Daily     budesonide  0.5 mg Nebulization BID     dabigatran ANTICOAGULANT  150 mg Oral BID     ferrous sulfate  325 mg Oral Daily     ipratropium - albuterol 0.5 mg/2.5 mg/3 mL  3 mL Nebulization 4x daily     lisinopril  20 mg Oral Daily     metoprolol tartrate  25 mg Oral BID     montelukast  10 mg Oral Daily     nitroGLYcerin  1 patch Transdermal Daily     nitroGLYcerin   Transdermal Q8H     simvastatin  40 mg Oral At Bedtime     sodium chloride (PF)  3 mL Intracatheter Q8H     Current Facility-Administered Medications   Medication Last Rate     - MEDICATION INSTRUCTIONS -       - MEDICATION INSTRUCTIONS -       Allergies   Allergies   Allergen Reactions     No Known Drug Allergies        Social History    reports that he has quit smoking. His smoking use included cigarettes. He has never used smokeless tobacco. He reports current alcohol use. He reports that he does not use drugs.    Family History   Family History   Problem Relation Age of Onset     C.A.D. Father      Cancer Mother         breast cancer,  of pneumonia     Cerebrovascular Disease Son      CABG Son      Family History Negative Child      Family History Negative Sister      Heart Disease Brother        Review of Systems   The comprehensive 10 point Review of Systems is negative other than noted in the HPI or here.     Physical Exam   Vital  "Signs with Ranges  Temp:  [95.8  F (35.4  C)-98.2  F (36.8  C)] 96.2  F (35.7  C)  Pulse:  [55-79] 79  Heart Rate:  [60-79] 78  Resp:  [16-21] 18  BP: (129-196)/() 144/59  SpO2:  [93 %-97 %] 95 %  Wt Readings from Last 4 Encounters:   07/28/20 71.1 kg (156 lb 11.2 oz)   07/22/20 71.2 kg (157 lb)   07/16/20 70.8 kg (156 lb 1.6 oz)   07/13/20 71.7 kg (158 lb)     I/O last 3 completed shifts:  In: -   Out: 1050 [Urine:1050]      Vitals: BP (!) 144/59 (BP Location: Right arm)   Pulse 79   Temp 96.2  F (35.7  C) (Oral)   Resp 18   Ht 1.727 m (5' 8\")   Wt 71.1 kg (156 lb 11.2 oz)   SpO2 95%   BMI 23.83 kg/m      General alert oriented x3 no acute distress.  Neck is supple JVP appears normal at about 7 to 8 cm.  Lungs are essentially clear.  Cardiac sounds are irregular.  No S3 heard.  He is in atrial fibrillation.  Soft ejection systolic murmur.  Extremities are warm without absolutely any edema.  Abdomen is nontender.  HEENT no icterus pallor.  Psych appropriate affect.  Severe hearing loss    Recent Labs   Lab 07/28/20  0643 07/28/20  0247 07/27/20  1906   TROPI 0.041 0.050* <0.015       Recent Labs   Lab 07/28/20  0643 07/28/20  0247 07/27/20  1906   WBC 4.4  --  4.3   HGB 9.8*  --  10.7*   MCV 70*  --  71*   *  --  133*     --  139   POTASSIUM 3.7  --  4.8   CHLORIDE 105  --  110*   CO2 26  --  26   BUN 26  --  25   CR 1.33*  --  1.37*   GFRESTIMATED 48*  --  46*   GFRESTBLACK 56*  --  54*   ANIONGAP 8  --  3   AYALA 9.2  --  9.1   GLC 92  --  101*   TROPI 0.041 0.050* <0.015     Recent Labs   Lab Test 06/01/20  0907 06/12/19  0818  07/07/15  0856 01/06/15  0823   CHOL 119 136   < > 117 129   HDL 48 46   < > 39* 48   LDL 54 74   < > 63 68   TRIG 87 82   < > 73 65   CHOLHDLRATIO  --   --   --  3.0 2.7    < > = values in this interval not displayed.     Recent Labs   Lab 07/28/20  0643 07/27/20  1906   WBC 4.4 4.3   HGB 9.8* 10.7*   HCT 30.7* 33.7*   MCV 70* 71*   * 133*     No results for " input(s): PH, PHV, PO2, PO2V, SAT, PCO2, PCO2V, HCO3, HCO3V in the last 168 hours.  Recent Labs   Lab 07/27/20  1906   NTBNPI 4,780*     Recent Labs   Lab 07/27/20  2004   DD 1.7*     No results for input(s): SED, CRP in the last 168 hours.  Recent Labs   Lab 07/28/20  0643 07/27/20  1906   * 133*     No results for input(s): TSH in the last 168 hours.  No results for input(s): COLOR, APPEARANCE, URINEGLC, URINEBILI, URINEKETONE, SG, UBLD, URINEPH, PROTEIN, UROBILINOGEN, NITRITE, LEUKEST, RBCU, WBCU in the last 168 hours.    Imaging:  Recent Results (from the past 48 hour(s))   XR Chest Port 1 View    Narrative    XR CHEST PORT 1 VW 7/27/2020 8:03 PM    HISTORY: acute SOB, s/p TAVR and PPM mid-July, cough several days ago    COMPARISON: 7/16/2020      Impression    IMPRESSION: Bibasilar pleural effusions with consolidation and/or  atelectasis not excluded. These are mildly increased from prior. Mild  pulmonary vascular prominence. No pneumothorax. Unchanged cardiac  size. Median sternotomy. Aortic atherosclerosis. Left-sided pacer.  TAVR.    JESSICA QUIROZ MD       Echo:  No results found for this or any previous visit (from the past 4320 hour(s)).

## 2020-07-28 NOTE — PROVIDER NOTIFICATION
MD Notification    Notified Person: MD    Notified Person Name: Irlanda    Notification Date/Time: 7/28/2020; 0505    Notification Interaction: paged    Purpose of Notification:  Troponin level 0.050     Orders Received: continue to trend troponin    Comments:

## 2020-07-28 NOTE — CODE/RAPID RESPONSE
Brief house [facility] note:    RRT paged for chest pain, patient describes it as 6/10 midsternal sharp constant pain. Troponins are barely elevated, continue to trend. 12 lead ECG looks improved from earlier exam. SL nitroglycerin x 1 with improvement in chest pain.         EKG Interpretation:      Interpreted by Jean Raphael NP  Time reviewed:0623   Symptoms at time of EKG: chest pain   Rhythm: Atrial fibrillation  Rate: Normal  Axis: Left Axis Deviation  Ectopy: None  Conduction: Normal and Bifasicular  ST Segments/ T Waves: No acute ischemic changes and T wave inversion V1 and V2  Q Waves: None and III  Comparison to prior: Improved  Clinical Impression: no acute changes    INTERVENTIONS:  -- 12 lead ECG  -- SL nitroglycerin given, please utilize PRN medications as ordered by admitting provider   -- 40 mg IV lasix x 1    Please page with additional concerns,    EVA Helm, CNP  Hospitalist - House [facility]  Text Page  ([phone])

## 2020-07-28 NOTE — PLAN OF CARE
RN:  Patient deaf and uses American sign language to .  To communicate with the patient, we have been using an  ,  a Jabber and paper as communication tools.  Patient A/O x4.  VSS on RA.  Able to communicate needs.  Denies chest pain.  NO SOB/CLEARY.  Tele monitored--AFIB with CVR. IV saline locked.  Tolerating low salt, no caffeine diet.  Up IND to the bathroom.  Echo completed with results pending.  Cardiology consult completed with recommendations.  Nitroglycerin patch on left arm. Good urine output.  Covid test negative.  [place] to [facility] [place].

## 2020-07-28 NOTE — PLAN OF CARE
"RRT called for chest pain 6/10, feeling of \"heavy weight\" on chest. PRN nitroglycerin given, relief noted, pt. rates pain 0/10. A/O x 4. Sign Language. VSS, except elevated BP, on RA. Tele: SR /BBB. Denies pain, denies chest pain. Troponin trending up, MD aware. D-dimer elevated, creat elevated. Lung sounds clear, CLEARY. Bowel sounds present. Up to bathroom, SBA, using urinal, voiding adequately. Large bruise to L hip.  2 gm NA diet, no caffeine.   "

## 2020-07-28 NOTE — PROGRESS NOTES
Pt seen earlier today by my colleague, Dr. Osborne. This note is in follow up    A&P:  Mr. Ingram is an 84 y/o male with a PMHx remarkable for L atrial appendage thrombus, heart failure, remote h/o CAB, h/o GI bleed, CKD, asthma and recent TAVR for severe aortic stenosis complicated by CHB requiring permanent ppm who presented to the ED with acute decompensated heart failure    COVID-19 negative on admission, low suspicion    S: feeling much better with diuresis. Some chest tightness this am but improved    Congestive heart failure, unspecified as of yet  Recent TAVR complicated by CHB and ppm placement  H/o CAD with CABG  PTA Aspirin 81 mg daily, lisinopril 20 mg daily, metoprolol 25 mg BID, simvastatin 40 mg at HS  Previous ALINE 7/14/20 with EF 55-60%, normal LV size and systolic function 7/15 with EF 60-65%, aortic valve well seated. Pt with several days worsening LE edema, woke up 7/27 afternoon with acute onset SOB. In ED with pro-BNP 4700. CXR with bilateral pleural effusions with possible consolidation vs atelectasis, mild vascular prominence. EKG without ischemia. WBC normal. procal negative. D-dimer elevated 1.7.  - telemetry  - serial troponins 0.015-> 0.050->0.041  - echocardiogram  - cardiology consulted  - continue furosemide 40 mg IV BID  - daily weights  - continue PTA lisinopril 20 mg, metoprolol 25 mg BID  - continue PTA simvastatin    Atrial fibrillation  H/o thrombus in atrial appendage  PTA pradaxa 150 mg BID, continue    Hypertension  HLD  PTA lisinopril 20 mg daily, metoprolol 25 mg BID, simvastatin 40 mg at HS  - continue metoprolol 25 mg BID and lisinopril 20 mg daily  - monitor with diuresis    CKD  Baseline creatinine in the last several years appears to be in the low-to-mid 1's range. Creatinine on admission at 1.33  - avoid nephrotoxins  - monitor creatinine with diuresis    Anemia  Baseline hgb variable. 7/16 discharge 9.6. 10.7, 9.8 on admission  - monitor    Asthma  PTA pulmicort 0.5 mg  by neb BID, prn albuterol inhaler, QID duo nebs, montelukast  - continue meds    Hearing loss      Nnamdi Sears M.D.  Hospitalist  Pager [phone]  Text Page

## 2020-07-28 NOTE — PROVIDER NOTIFICATION
MD Notification    Notified Person: MD    Notified Person Name:Dr. Sears     Notification Date/Time:07/28/20 0744    Notification Interaction: web paged MD-- MDreturned phone call     Purpose of Notification: Covid retest clarification versus transfer.          Comments: MD to chart review to determine if re-testing is necessary.

## 2020-07-29 ENCOUNTER — OFFICE VISIT (OUTPATIENT)
Dept: INTERPRETER SERVICES | Facility: CLINIC | Age: 85
End: 2020-07-29
Payer: COMMERCIAL

## 2020-07-29 VITALS
HEART RATE: 66 BPM | OXYGEN SATURATION: 96 % | WEIGHT: 149.6 LBS | SYSTOLIC BLOOD PRESSURE: 130 MMHG | RESPIRATION RATE: 16 BRPM | TEMPERATURE: 98.1 F | HEIGHT: 68 IN | BODY MASS INDEX: 22.67 KG/M2 | DIASTOLIC BLOOD PRESSURE: 64 MMHG

## 2020-07-29 LAB
ANION GAP SERPL CALCULATED.3IONS-SCNC: 4 MMOL/L (ref 3–14)
ANISOCYTOSIS BLD QL SMEAR: ABNORMAL
BASOPHILS # BLD AUTO: 0.1 10E9/L (ref 0–0.2)
BASOPHILS NFR BLD AUTO: 1.2 %
BUN SERPL-MCNC: 26 MG/DL (ref 7–30)
CALCIUM SERPL-MCNC: 9.4 MG/DL (ref 8.5–10.1)
CHLORIDE SERPL-SCNC: 104 MMOL/L (ref 94–109)
CO2 SERPL-SCNC: 32 MMOL/L (ref 20–32)
CREAT SERPL-MCNC: 1.45 MG/DL (ref 0.66–1.25)
DIFFERENTIAL METHOD BLD: ABNORMAL
EOSINOPHIL # BLD AUTO: 0.5 10E9/L (ref 0–0.7)
EOSINOPHIL NFR BLD AUTO: 12.8 %
ERYTHROCYTE [DISTWIDTH] IN BLOOD BY AUTOMATED COUNT: 18.3 % (ref 10–15)
GFR SERPL CREATININE-BSD FRML MDRD: 43 ML/MIN/{1.73_M2}
GLUCOSE SERPL-MCNC: 101 MG/DL (ref 70–99)
HCT VFR BLD AUTO: 33 % (ref 40–53)
HGB BLD-MCNC: 10.7 G/DL (ref 13.3–17.7)
IMM GRANULOCYTES # BLD: 0 10E9/L (ref 0–0.4)
IMM GRANULOCYTES NFR BLD: 0.5 %
INTERPRETATION ECG - MUSE: NORMAL
LYMPHOCYTES # BLD AUTO: 0.7 10E9/L (ref 0.8–5.3)
LYMPHOCYTES NFR BLD AUTO: 16.9 %
MCH RBC QN AUTO: 22.4 PG (ref 26.5–33)
MCHC RBC AUTO-ENTMCNC: 32.4 G/DL (ref 31.5–36.5)
MCV RBC AUTO: 69 FL (ref 78–100)
MONOCYTES # BLD AUTO: 0.4 10E9/L (ref 0–1.3)
MONOCYTES NFR BLD AUTO: 9.2 %
NEUTROPHILS # BLD AUTO: 2.5 10E9/L (ref 1.6–8.3)
NEUTROPHILS NFR BLD AUTO: 59.4 %
NRBC # BLD AUTO: 0 10*3/UL
NRBC BLD AUTO-RTO: 0 /100
OVALOCYTES BLD QL SMEAR: SLIGHT
PLATELET # BLD AUTO: 120 10E9/L (ref 150–450)
PLATELET # BLD EST: ABNORMAL 10*3/UL
POTASSIUM SERPL-SCNC: 3.9 MMOL/L (ref 3.4–5.3)
RBC # BLD AUTO: 4.77 10E12/L (ref 4.4–5.9)
SODIUM SERPL-SCNC: 140 MMOL/L (ref 133–144)
TARGETS BLD QL SMEAR: SLIGHT
WBC # BLD AUTO: 4.2 10E9/L (ref 4–11)

## 2020-07-29 PROCEDURE — 36415 COLL VENOUS BLD VENIPUNCTURE: CPT | Performed by: INTERNAL MEDICINE

## 2020-07-29 PROCEDURE — 99239 HOSP IP/OBS DSCHRG MGMT >30: CPT | Performed by: INTERNAL MEDICINE

## 2020-07-29 PROCEDURE — 99232 SBSQ HOSP IP/OBS MODERATE 35: CPT | Mod: 24 | Performed by: NURSE PRACTITIONER

## 2020-07-29 PROCEDURE — 94640 AIRWAY INHALATION TREATMENT: CPT

## 2020-07-29 PROCEDURE — 25000125 ZZHC RX 250: Performed by: INTERNAL MEDICINE

## 2020-07-29 PROCEDURE — 40000275 ZZH STATISTIC RCP TIME EA 10 MIN

## 2020-07-29 PROCEDURE — 80048 BASIC METABOLIC PNL TOTAL CA: CPT | Performed by: INTERNAL MEDICINE

## 2020-07-29 PROCEDURE — 85025 COMPLETE CBC W/AUTO DIFF WBC: CPT | Performed by: INTERNAL MEDICINE

## 2020-07-29 PROCEDURE — 94640 AIRWAY INHALATION TREATMENT: CPT | Mod: 76

## 2020-07-29 PROCEDURE — 25000132 ZZH RX MED GY IP 250 OP 250 PS 637: Performed by: INTERNAL MEDICINE

## 2020-07-29 PROCEDURE — 25000132 ZZH RX MED GY IP 250 OP 250 PS 637: Performed by: NURSE PRACTITIONER

## 2020-07-29 PROCEDURE — T1013 SIGN LANG/ORAL INTERPRETER: HCPCS | Mod: U3

## 2020-07-29 RX ORDER — LANOLIN ALCOHOL/MO/W.PET/CERES
3 CREAM (GRAM) TOPICAL
Qty: 30 TABLET | Refills: 0 | Status: ON HOLD | OUTPATIENT
Start: 2020-07-29 | End: 2023-01-15

## 2020-07-29 RX ORDER — POTASSIUM CHLORIDE 750 MG/1
10 TABLET, EXTENDED RELEASE ORAL DAILY
Qty: 30 TABLET | Refills: 0 | Status: SHIPPED | OUTPATIENT
Start: 2020-07-30 | End: 2020-08-26

## 2020-07-29 RX ORDER — FUROSEMIDE 20 MG
20 TABLET ORAL DAILY
Status: DISCONTINUED | OUTPATIENT
Start: 2020-07-29 | End: 2020-07-29 | Stop reason: HOSPADM

## 2020-07-29 RX ORDER — POTASSIUM CHLORIDE 750 MG/1
10 TABLET, EXTENDED RELEASE ORAL DAILY
Status: DISCONTINUED | OUTPATIENT
Start: 2020-07-29 | End: 2020-07-29 | Stop reason: HOSPADM

## 2020-07-29 RX ORDER — FUROSEMIDE 20 MG
20 TABLET ORAL DAILY
Qty: 30 TABLET | Refills: 0 | Status: SHIPPED | OUTPATIENT
Start: 2020-07-30 | End: 2020-08-26

## 2020-07-29 RX ADMIN — MONTELUKAST 10 MG: 10 TABLET, FILM COATED ORAL at 09:28

## 2020-07-29 RX ADMIN — FERROUS SULFATE TAB 325 MG (65 MG ELEMENTAL FE) 325 MG: 325 (65 FE) TAB at 09:27

## 2020-07-29 RX ADMIN — IPRATROPIUM BROMIDE AND ALBUTEROL SULFATE 3 ML: .5; 3 SOLUTION RESPIRATORY (INHALATION) at 11:22

## 2020-07-29 RX ADMIN — FUROSEMIDE 20 MG: 20 TABLET ORAL at 12:19

## 2020-07-29 RX ADMIN — BUDESONIDE 0.5 MG: 0.5 INHALANT RESPIRATORY (INHALATION) at 07:28

## 2020-07-29 RX ADMIN — DABIGATRAN ETEXILATE MESYLATE 150 MG: 150 CAPSULE ORAL at 09:26

## 2020-07-29 RX ADMIN — METOPROLOL TARTRATE 25 MG: 25 TABLET, FILM COATED ORAL at 09:27

## 2020-07-29 RX ADMIN — NITROGLYCERIN 1 PATCH: 0.4 PATCH TRANSDERMAL at 09:26

## 2020-07-29 RX ADMIN — POTASSIUM CHLORIDE 10 MEQ: 10 TABLET, EXTENDED RELEASE ORAL at 12:19

## 2020-07-29 RX ADMIN — ASPIRIN 81 MG: 81 TABLET, COATED ORAL at 09:27

## 2020-07-29 RX ADMIN — IPRATROPIUM BROMIDE AND ALBUTEROL SULFATE 3 ML: .5; 3 SOLUTION RESPIRATORY (INHALATION) at 07:28

## 2020-07-29 RX ADMIN — LISINOPRIL 20 MG: 20 TABLET ORAL at 09:43

## 2020-07-29 RX ADMIN — IPRATROPIUM BROMIDE AND ALBUTEROL SULFATE 3 ML: .5; 3 SOLUTION RESPIRATORY (INHALATION) at 15:21

## 2020-07-29 ASSESSMENT — ACTIVITIES OF DAILY LIVING (ADL)
ADLS_ACUITY_SCORE: 14

## 2020-07-29 ASSESSMENT — MIFFLIN-ST. JEOR: SCORE: 1338.08

## 2020-07-29 NOTE — PROGRESS NOTES
[id number]:  Pt AO4.  Independent in the room.  VSS on RA.  ASL.  Tele A fib CVR.  No complaints throughout shift.

## 2020-07-29 NOTE — PROGRESS NOTES
Brief cardiology progress note:  Called and spoke with patient's son Willi to give him an update on the plan of care per the patient's request. We reviewed the plan for repeat lab work in about 1 week to recheck his kidney function and electrolytes as well as the follow-up visit with cardiology on 8/13/2020 as previously scheduled. Willi stated that he has a doctor's appointment this afternoon which will get done around 4:30 PM. He will be available to  the patient anytime around 5-6 PM tonight for discharge home. Updated RN Quintin caring for patient. Jose Baer, APRN, CNP

## 2020-07-29 NOTE — DISCHARGE SUMMARY
[facility]    Discharge Summary  Hospitalist    Date of Admission:  7/27/2020  Date of Discharge:  7/29/2020  Discharging Provider: Nnamdi Sears MD  Date of Service (when I saw the patient): 07/29/20    Discharge Diagnoses    Acute decompensated heart failure in the setting of mild heart failure with preserved ejection fraction  Recent TAVR complicated by CHB and ppm placement  H/o CAD with CABG  Atrial fibrillation  H/o thrombus in atrial appendage  Hypertension  Hyperlipidemia  CKD  Anemia  Asthma  Hearing loss    History of Present Illness   Mr. Ingram is an 84 y/o male with a PMHx remarkable for L atrial appendage thrombus, heart failure, remote h/o CAB, h/o GI bleed, CKD, asthma and recent TAVR for severe aortic stenosis complicated by CHB requiring permanent ppm who presented to the ED with acute decompensated heart failur    Hospital Course   Shiraz Ingram was admitted on 7/27/2020.  The following problems were addressed during his hospitalization:    Acute decompensated heart failure in the setting of mild heart failure with preserved ejection fraction  Recent TAVR complicated by CHB and ppm placement  H/o CAD with CABG  PTA Aspirin 81 mg daily, lisinopril 20 mg daily, metoprolol 25 mg BID, simvastatin 40 mg at HS  Previous ALINE 7/14/20 with EF 55-60%, normal LV size and systolic function 7/15 with EF 60-65%, aortic valve well seated. Pt with several days worsening LE edema, woke up 7/27 afternoon with acute onset SOB. In ED with pro-BNP 4700. CXR with bilateral pleural effusions with possible consolidation vs atelectasis, mild vascular prominence. EKG without ischemia. WBC normal. procal negative. D-dimer elevated 1.7.  - serial troponins 0.015-> 0.050->0.041  - echocardiogram largely unchanged from previous, valve looks good  - cardiology followed during hospital stay  - continue PTA lisinopril 20 mg daily, metoprolol 25 mg BID at discharge  - continue PTA simvastatin at discharge  -  will be discharged with furosemide 20 mg daily with KCL 10 mEq, follow up labs in a week and follow up with cardiology     Atrial fibrillation  H/o thrombus in atrial appendage  PTA pradaxa 150 mg BID, continue at discharge     Hypertension  HLD  PTA lisinopril 20 mg daily, metoprolol 25 mg BID, simvastatin 40 mg at HS  - continue metoprolol 25 mg BID and lisinopril 20 mg daily at discharge  - continue statin at discharge     CKD  Baseline creatinine in the last several years appears to be in the low-to-mid 1's range. Creatinine on admission at 1.33  -stable/ baseline at the time of discharge     Anemia  Baseline hgb variable. 7/16 discharge 9.6. 10.7, 9.8 on admission  - 10.8 at the time of discharge 7/29    Asthma  PTA pulmicort 0.5 mg by neb BID, prn albuterol inhaler, QID duo nebs, montelukast  - resume at discharge     Hearing loss      Nnamdi Sears M.D.  Hospitalist  Pager [phone]    Significant Results and Procedures   echocardiogram    Pending Results   None    Code Status   Full Code       Primary Care Physician   Dany Rodriguez MD    Physical Exam   Temp: 98.1  F (36.7  C) Temp src: Oral BP: 130/64 Pulse: 66 Heart Rate: 66 Resp: 16 SpO2: 94 % O2 Device: None (Room air)    Vitals:    07/27/20 1825 07/28/20 0035 07/29/20 0500   Weight: 71.2 kg (157 lb) 71.1 kg (156 lb 11.2 oz) 67.9 kg (149 lb 9.6 oz)     Vital Signs with Ranges  Temp:  [98.1  F (36.7  C)-98.4  F (36.9  C)] 98.1  F (36.7  C)  Pulse:  [66-69] 66  Heart Rate:  [66-74] 66  Resp:  [16-18] 16  BP: (124-137)/(52-67) 130/64  SpO2:  [93 %-96 %] 94 %  I/O last 3 completed shifts:  In: 360 [P.O.:360]  Out: -     Constitutional: Alert, oriented, no acute distress  Respiratory: Lungs clear to auscultation bilaterally, no wheezes, no crackles  Cardiovascular: Regular rate and rhythm, no murmurs  GI: Soft, non-tender, non-disteneded, good bowel sounds  Skin/Integumen: No erythema, cyanosis or edema  Other:      Discharge  Disposition   Discharged to home  Condition at discharge: Stable    Consultations This Hospital Stay   CARDIOLOGY IP CONSULT  SMOKING CESSATION PROGRAM IP CONSULT    Time Spent on this Encounter   I, Nnamdi Sears MD, personally saw the patient today and spent greater than 30 minutes discharging this patient.    Discharge Orders      Basic metabolic panel     Follow-up and recommended labs and tests     **Please note that your follow up with Dr. Rodriguez is at the [facility].  **([facility] is not doing in person visits at this time.)     Discharge Medications   Current Discharge Medication List      START taking these medications    Details   furosemide (LASIX) 20 MG tablet Take 1 tablet (20 mg) by mouth daily  Qty: 30 tablet, Refills: 0    Associated Diagnoses: Acute on chronic systolic congestive heart failure (H)      melatonin 3 MG tablet Take 1 tablet (3 mg) by mouth nightly as needed for sleep  Qty: 30 tablet, Refills: 0    Associated Diagnoses: Insomnia, unspecified type      potassium chloride ER (KLOR-CON M) 10 MEQ CR tablet Take 1 tablet (10 mEq) by mouth daily  Qty: 30 tablet, Refills: 0    Associated Diagnoses: Acute on chronic systolic congestive heart failure (H)         CONTINUE these medications which have NOT CHANGED    Details   albuterol (PROAIR HFA/PROVENTIL HFA/VENTOLIN HFA) 108 (90 Base) MCG/ACT inhaler Inhale 2 puffs into the lungs 4 times daily as needed for shortness of breath / dyspnea or wheezing    Comments: Pharmacy may dispense brand covered by insurance (Proair, or proventil or ventolin or generic albuterol inhaler)      aspirin (ASA) 81 MG EC tablet Take 81 mg by mouth daily      budesonide (PULMICORT) 0.5 MG/2ML nebulizer solution Take 2 mLs (0.5 mg) by nebulization 2 times daily  Qty: 120 mL, Refills: 0    Associated Diagnoses: Moderate persistent asthma      calcium carbonate 600 mg-vitamin D 400 units (CALTRATE) 600-400 MG-UNIT per tablet Take 1 tablet by mouth  daily      dabigatran ANTICOAGULANT (PRADAXA) 150 MG capsule Take 1 capsule (150 mg) by mouth 2 times daily Store in original 's bottle or blister pack; use within 120 days of opening.  Qty: 180 capsule, Refills: 3    Associated Diagnoses: Coronary artery disease involving native coronary artery of native heart without angina pectoris      ferrous sulfate (IRON) 325 (65 Fe) MG tablet TAKE 1 TABLET(325 MG) BY MOUTH TWICE DAILY  Qty: 180 tablet, Refills: 0    Associated Diagnoses: Anemia, unspecified type      ipratropium - albuterol 0.5 mg/2.5 mg/3 mL (DUONEB) 0.5-2.5 (3) MG/3ML nebulization Inhale 1 vial (3 mLs) into the lungs 4 times daily  Qty: 360 mL, Refills: 11    Associated Diagnoses: Moderate persistent asthma, uncomplicated      lisinopril (ZESTRIL) 20 MG tablet Take 20 mg by mouth daily   Qty: 30 tablet, Refills: 0    Associated Diagnoses: Severe aortic stenosis; Complete heart block (H); Permanent atrial fibrillation (H); Thrombus of left atrial appendage      metoprolol tartrate (LOPRESSOR) 25 MG tablet Take 1 tablet (25 mg) by mouth 2 times daily  Qty: 60 tablet, Refills: 0    Comments: Future refills by PCP Dr. Dany Rdoriguez MD with phone number [phone].  Associated Diagnoses: Severe aortic stenosis; Complete heart block (H); Permanent atrial fibrillation (H); Thrombus of left atrial appendage      montelukast (SINGULAIR) 10 MG tablet TAKE 1 TABLET(10 MG) BY MOUTH DAILY  Qty: 90 tablet, Refills: 1    Comments: **Patient requests 90 days supply**  Associated Diagnoses: Moderate persistent asthma      multivitamin, therapeutic (THERA-VIT) TABS tablet Take 1 tablet by mouth every evening       simvastatin (ZOCOR) 40 MG tablet TAKE 1 TABLET(40 MG) BY MOUTH AT BEDTIME  Qty: 90 tablet, Refills: 1    Associated Diagnoses: Hyperlipidemia with target LDL less than 100      meclizine (ANTIVERT) 12.5 MG tablet Take 12.5 mg by mouth 3 times daily as needed for dizziness       nitroGLYcerin (NITROSTAT) 0.4 MG sublingual tablet For chest pain place 1 tablet under the tongue every 5 minutes for 3 doses. If symptoms persist 5 minutes after 1st dose call 911.  Qty: 12 tablet, Refills: 0    Comments: Future refills by PCP Dr. Dany Rodriguez MD with phone number [phone].  Associated Diagnoses: Severe aortic stenosis; Complete heart block (H); Permanent atrial fibrillation (H); Thrombus of left atrial appendage           Allergies   Allergies   Allergen Reactions     No Known Drug Allergies      Data   Most Recent 3 CBC's:  Recent Labs   Lab Test 07/29/20  0558 07/28/20  0643 07/27/20  1906   WBC 4.2 4.4 4.3   HGB 10.7* 9.8* 10.7*   MCV 69* 70* 71*   * 120* 133*      Most Recent 3 BMP's:  Recent Labs   Lab Test 07/29/20  0558 07/28/20  0643 07/27/20  1906    139 139   POTASSIUM 3.9 3.7 4.8   CHLORIDE 104 105 110*   CO2 32 26 26   BUN 26 26 25   CR 1.45* 1.33* 1.37*   ANIONGAP 4 8 3   AYALA 9.4 9.2 9.1   * 92 101*     Most Recent 2 LFT's:  Recent Labs   Lab Test 07/14/20  1650 07/13/20  1420   AST 20 23   ALT 21 28   ALKPHOS 60 82   BILITOTAL 0.5 0.6     Most Recent INR's and Anticoagulation Dosing History:  Anticoagulation Dose History     Recent Dosing and Labs Latest Ref Rng & Units 3/16/2012 4/23/2020 4/24/2020 5/23/2020 6/3/2020 7/13/2020    INR 0.86 - 1.14 1.11 2.35(H) 1.24(H) 2.18(H) 1.25(H) 1.21(H)        Most Recent 3 Troponin's:  Recent Labs   Lab Test 07/28/20  0643 07/28/20  0247 07/27/20  1906   TROPI 0.041 0.050* <0.015     Most Recent Cholesterol Panel:  Recent Labs   Lab Test 06/01/20  0907   CHOL 119   LDL 54   HDL 48   TRIG 87     Most Recent 6 Bacteria Isolates From Any Culture (See EPIC Reports for Culture Details):  Recent Labs   Lab Test 07/04/20  1201 07/03/20  1558 01/29/17  1903 01/29/17  1810 02/19/13 2115 02/19/13 2110   CULT Light growth  Normal katie   No growth  No growth No growth No growth No growth No growth     Most Recent  TSH, T4 and A1c Labs:  Recent Labs   Lab Test 13  1702   TSH 1.66     Results for orders placed or performed during the hospital encounter of 20   XR Chest Port 1 View    Narrative    XR CHEST PORT 1 VW 2020 8:03 PM    HISTORY: acute SOB, s/p TAVR and PPM mid-July, cough several days ago    COMPARISON: 2020      Impression    IMPRESSION: Bibasilar pleural effusions with consolidation and/or  atelectasis not excluded. These are mildly increased from prior. Mild  pulmonary vascular prominence. No pneumothorax. Unchanged cardiac  size. Median sternotomy. Aortic atherosclerosis. Left-sided pacer.  TAVR.    JESSICA QUIROZ MD   Echocardiogram Limited    Narrative    [id number]  [id number]  [id number]  [id number]^URIAH^VENANCIO^MIGNON           [facility]  Echocardiography Laboratory  [address] [place] [address]        Name: SRIKANTH OBANDO  MRN: [id number]  : 1934  Study Date: 2020 02:11 PM  Age: 85 yrs  Gender: Male  Patient Location: [facility]  Reason For Study: CHF  Ordering Physician: VENANCIO KRUSE  Referring Physician: Dany Rodriguez  Performed By: Georgette Juarez     BSA: 1.8 m2  Height: 68 in  Weight: 156 lb  HR: 63  BP: 149/78 mmHg  _____________________________________________________________________________  __        Procedure  Limited Portable Echo Adult.  _____________________________________________________________________________  __        Interpretation Summary     Left ventricular size, global systolic function, and wall motion are normal,  estimated LVEF 60-65%.  Right ventricular global function is normal.  There is a pacemaker lead in the right ventricle.  History of TAVR with a 29 mm Medtronic bioprosthetic aortic valve in place.  Normal function on Doppler interrogation. Vmax 1.3 m/s, mean gradient 3 mmHg.  DI 0.55. Trivial paravalvular insufficiency at the 5 o'clock position and the  12 o'clock position on short-axis.     This study was  compared to a TTE from 7/15/2020. There has been no significant  change, though the trivial PVL at the 12 o'clock position was not see on that  study, however was appreciated on the study from 7/14/2020.  _____________________________________________________________________________  __        Left Ventricle  The left ventricle is normal in size. There is mild concentric left  ventricular hypertrophy. Left ventricular systolic function is normal. The  visual ejection fraction is estimated at 60-65%. Left ventricular diastolic  function is normal. No regional wall motion abnormalities noted.     Right Ventricle  The right ventricle is normal in structure, function and size. There is a  pacemaker lead in the right ventricle.     Atria  The left atrium is not well visualized. Right atrial size is normal.     Mitral Valve  The mitral valve is normal in structure and function.        Tricuspid Valve  There is trace to mild tricuspid regurgitation. The right ventricular systolic  pressure is approximated at 36.0 mmHg plus the right atrial pressure.     Aortic Valve  The prosthetic aortic valve is well-seated. History of TAVR with a 29 mm  Medtronic bioprosthetic aortic valve in place. Normal function on Doppler  interrogation. Vmax 1.3 m/s, mean gradient 3 mmHg. DI 0.55. Trivial  paravalvular insufficiency at the 5 o'clock position and the 12 o'clock  position on short-axis.     Pulmonic Valve  The pulmonic valve is not well visualized.     Vessels  The aortic root is normal size. The inferior vena cava was normal in size with  preserved respiratory variability.     Pericardium  There is no pericardial effusion.     _____________________________________________________________________________  __  MMode/2D Measurements & Calculations  IVSd: 1.2 cm  LVIDd: 4.9 cm  LVIDs: 3.4 cm  LVPWd: 1.2 cm  FS: 30.4 %  LV mass(C)d: 227.4 grams  LV mass(C)dI: 123.6 grams/m2     RWT: 0.49        Doppler Measurements & Calculations  Ao V2  max: 122.5 cm/sec  Ao max P.0 mmHg  Ao V2 mean: 83.1 cm/sec  Ao mean PG: 3.2 mmHg  Ao V2 VTI: 23.4 cm  AI P1/2t: 236.1 msec  LV V1 max P.8 mmHg  LV V1 max: 67.9 cm/sec  LV V1 VTI: 12.6 cm  TR max vishnu: 300.0 cm/sec  TR max P.0 mmHg  AV Vishnu Ratio (DI): 0.55           _____________________________________________________________________________  __           Report approved by: Billie Bo 2020 03:21 PM

## 2020-07-29 NOTE — PROGRESS NOTES
[facility]    Cardiology Progress Note    Primary Cardiologist: Dr. Field    Date of Admission: 07/27/2020  Service date: 07/29/2020    Summary:  Mr. Shiraz Ingram is a very pleasant 85 year old male with a history of severe aortic stenosis, status post recent TAVR 7/14/20, Complete heart block following TAVR status post dual-chamber pacemaker, CAD s/p CABG (LIMA to LAD, vein graft to OM and RCA), atrial fibrillation w/ MAYA thrombus on chronic anticoagulation, hearing loss, chromic anemia, chronic diastolic heart failure, hypertension, asthma, chronic kidney disease stage III, and recent GI bleed requiring transfusion. who was admitted on 7/27/2020 for shortness of breath. Cardiology was consulted for acute decompensated congestive heart failure with preserved ejection fraction.    Assessment & Plan   1. Acute decompensated HFpEF  - Limited echocardiogram 7/28/2020 shows an LVEF of 60 to 65% with normal RV function.  - NT proBNP elevated at over 4700 and chest x-ray showing mild pulmonary vascular prominence.  - Not on diuretics PTA. Diuresed with several doses of 40 mg of IV Lasix. Admit weight of 157 pounds, down today to 149 pounds with net -1.6 L output since admission. Now appears euvolemic with significant improvement in his shortness of breath.    2. Severe aortic stenosis status post recent TAVR with a 29 mm Medtronic bioprosthetic valve on 7/14/2020  - Valve appears stable on limited echo 7/28/2020 with only trivial perivalvular insufficiency and a mean gradient of 3 mmHg.    3.  Complete heart block post TAVR status post dual-chamber PPM implant    4. History of left atrial appendage thrombus and atrial fibrillation  - On chronic anticoagulation with Pradaxa    5.  Chronic kidney disease stage III  - Baseline creatinine around 1.4.  - Creatinine at 1.37 upon admission and stable today at 1.45.    6. History of GI bleed    7. Hearing loss  - Today's visit was completed with the  assistance of a licensed .    Plan:   1. Recommend transitioning to p.o. Lasix 20 mg once daily with potassium 10 mEq once daily.  2. Recommended he check his weight daily at home and call if he notices any significant weight gain, recurrence of shortness of breath, or lower extremity edema.  3. Cardiology will sign off. Please do not hesitate to call with questions.  4. Follow up with cardiology as planned with Daksha Smith NP on 8/13/2020. Will plan for a repeat basic metabolic panel in about 1 week post discharge prior to that visit.    Disposition Plan   Expected discharge in 0-1 day(s) to prior living arrangement      Entered: Joesph Baer 07/29/2020, 10:27 AM     Interval History   No acute events overnight. Patient is currently resting comfortably and reports that his shortness of breath has resolved following the doses of IV Lasix and he feels significantly better. He has been up and walking in his room and has tolerated this without exertional symptoms. We reviewed the plan of care as outlined above.  He stated understanding. All questions were answered.    Telemetry: A.Fib with CVR    Thank you for the opportunity to participate in this pleasant patient's care.     Jose Baer, EVA, CNP   Text Page  (8am - 5pm, M-F)    Patient Active Problem List   Diagnosis     Coronary atherosclerosis     Benign neoplasm of colon     Localized osteoarthrosis, lower leg     Allergic rhinitis     Moderate persistent asthma     Anemia     Osteoporosis     Hypertension goal BP (blood pressure) < 140/90     Hyperlipidemia with target LDL less than 100     CKD (chronic kidney disease) stage 3, GFR 30-59 ml/min (H)     Nonrheumatic aortic valve stenosis     Advanced directives, counseling/discussion     Iron deficiency anemia     Vitamin B 12 deficiency     Health Care Home     Mild persistent asthma without complication     Acute respiratory failure (H)     Acute on chronic congestive heart  failure, unspecified heart failure type (H)     Severe aortic stenosis     Coronary artery disease involving native coronary artery of native heart without angina pectoris     S/P CABG (coronary artery bypass graft)     Hyperkalemia     GI bleed     Pneumonia     Atrial fibrillation (H)     Thrombus of left atrial appendage     Complete heart block (H)     Aortic stenosis, severe     Acute exacerbation of CHF (congestive heart failure) (H)     Physical Exam   Temp: 98.3  F (36.8  C) Temp src: Oral BP: 131/52 Pulse: 66 Heart Rate: 71 Resp: 16 SpO2: 94 % O2 Device: None (Room air)    Vitals:    07/27/20 1825 07/28/20 0035 07/29/20 0500   Weight: 71.2 kg (157 lb) 71.1 kg (156 lb 11.2 oz) 67.9 kg (149 lb 9.6 oz)     Vital Signs with Ranges  Temp:  [98.3  F (36.8  C)-98.4  F (36.9  C)] 98.3  F (36.8  C)  Pulse:  [66-69] 66  Heart Rate:  [71-74] 71  Resp:  [16-18] 16  BP: (124-137)/(52-67) 131/52  SpO2:  [93 %-96 %] 94 %  I/O last 3 completed shifts:  In: 180 [P.O.:180]  Out: 775 [Urine:775]    Constitutional: Appears his stated age, well nourished, and in no acute distress.  Eyes: Pupils equal, round. Sclerae anicteric.   HEENT: Normocephalic, atraumatic.   Neck: Supple. No JVD appreciated.  Respiratory: Breathing non-labored. Lungs clear to auscultation bilaterally.  No crackles or wheezes.  Cardiovascular: Irregularly irregular rhythm, controlled rate. No murmur, rub, or gallop appreciated.  GI: Soft, non-distended, non-tender.  Skin: Warm, dry.  No apparent rashes.  Musculoskeletal/Extremities: Moves all extremities well and symmetrically. No lower extremity edema bilaterally.  Neurologic: No gross focal deficits. Alert, awake, and oriented to person, place and time.  Psychiatric: Affect appropriate. Mentation normal.    Medications     - MEDICATION INSTRUCTIONS -       - MEDICATION INSTRUCTIONS -         aspirin  81 mg Oral Daily     budesonide  0.5 mg Nebulization BID     dabigatran ANTICOAGULANT  150 mg Oral BID      ferrous sulfate  325 mg Oral Daily     ipratropium - albuterol 0.5 mg/2.5 mg/3 mL  3 mL Nebulization 4x daily     lisinopril  20 mg Oral Daily     metoprolol tartrate  25 mg Oral BID     montelukast  10 mg Oral Daily     nitroGLYcerin  1 patch Transdermal Daily     nitroGLYcerin   Transdermal Q8H     simvastatin  40 mg Oral At Bedtime     sodium chloride (PF)  3 mL Intracatheter Q8H     Data   Recent Labs   Lab 07/29/20  0558 07/28/20  0643 07/28/20  0247 07/27/20  1906   WBC 4.2 4.4  --  4.3   HGB 10.7* 9.8*  --  10.7*   MCV 69* 70*  --  71*   * 120*  --  133*    139  --  139   POTASSIUM 3.9 3.7  --  4.8   CHLORIDE 104 105  --  110*   CO2 32 26  --  26   BUN 26 26  --  25   CR 1.45* 1.33*  --  1.37*   ANIONGAP 4 8  --  3   AYALA 9.4 9.2  --  9.1   * 92  --  101*   TROPI  --  0.041 0.050* <0.015     This note was completed in part using Dragon voice recognition software. Although reviewed after completion, some word and grammatical errors may occur.

## 2020-07-29 NOTE — PLAN OF CARE
Pt VSS on RA. Tele Vpaced with underlying afib and occasional PVC's. A&Ox4. Ind in room, using bathroom. Denies pain. Slept. Plan to continue to diurese with po lasix and for likely discharge. Continue to monitor.

## 2020-07-29 NOTE — PROGRESS NOTES
Reviewed AVS with patient including medications, follow  Up appointments.  Scripts given to patient, opportunity for questions given.  Tele and IV removed by NA.   Services utilized during discharge process.  Pt now awaiting transport home with son upon his arrival.

## 2020-07-30 ENCOUNTER — PATIENT OUTREACH (OUTPATIENT)
Dept: CARE COORDINATION | Facility: CLINIC | Age: 85
End: 2020-07-30

## 2020-07-30 ENCOUNTER — HOSPITAL ENCOUNTER (OUTPATIENT)
Dept: CARDIAC REHAB | Facility: CLINIC | Age: 85
End: 2020-07-30
Attending: INTERNAL MEDICINE
Payer: COMMERCIAL

## 2020-07-30 ENCOUNTER — TELEPHONE (OUTPATIENT)
Dept: FAMILY MEDICINE | Facility: CLINIC | Age: 85
End: 2020-07-30

## 2020-07-30 LAB
MDC_IDC_LEAD_IMPLANT_DT: NORMAL
MDC_IDC_LEAD_LOCATION: NORMAL
MDC_IDC_LEAD_LOCATION_DETAIL_1: NORMAL
MDC_IDC_LEAD_MFG: NORMAL
MDC_IDC_LEAD_MODEL: NORMAL
MDC_IDC_LEAD_POLARITY_TYPE: NORMAL
MDC_IDC_LEAD_SERIAL: NORMAL
MDC_IDC_MSMT_BATTERY_DTM: NORMAL
MDC_IDC_MSMT_BATTERY_REMAINING_LONGEVITY: 184 MO
MDC_IDC_MSMT_BATTERY_RRT_TRIGGER: 2.62
MDC_IDC_MSMT_BATTERY_STATUS: NORMAL
MDC_IDC_MSMT_BATTERY_VOLTAGE: 3.22 V
MDC_IDC_MSMT_LEADCHNL_RV_IMPEDANCE_VALUE: 380 OHM
MDC_IDC_MSMT_LEADCHNL_RV_IMPEDANCE_VALUE: 475 OHM
MDC_IDC_MSMT_LEADCHNL_RV_PACING_THRESHOLD_AMPLITUDE: 1.25 V
MDC_IDC_MSMT_LEADCHNL_RV_PACING_THRESHOLD_PULSEWIDTH: 0.4 MS
MDC_IDC_MSMT_LEADCHNL_RV_SENSING_INTR_AMPL: 7.38 MV
MDC_IDC_MSMT_LEADCHNL_RV_SENSING_INTR_AMPL: 7.75 MV
MDC_IDC_PG_IMPLANT_DTM: NORMAL
MDC_IDC_PG_MFG: NORMAL
MDC_IDC_PG_MODEL: NORMAL
MDC_IDC_PG_SERIAL: NORMAL
MDC_IDC_PG_TYPE: NORMAL
MDC_IDC_SESS_CLINIC_NAME: NORMAL
MDC_IDC_SESS_DTM: NORMAL
MDC_IDC_SESS_TYPE: NORMAL
MDC_IDC_SET_BRADY_HYSTRATE: NORMAL
MDC_IDC_SET_BRADY_LOWRATE: 50 {BEATS}/MIN
MDC_IDC_SET_BRADY_MODE: NORMAL
MDC_IDC_SET_LEADCHNL_RV_PACING_AMPLITUDE: 2.75 V
MDC_IDC_SET_LEADCHNL_RV_PACING_ANODE_ELECTRODE_1: NORMAL
MDC_IDC_SET_LEADCHNL_RV_PACING_ANODE_LOCATION_1: NORMAL
MDC_IDC_SET_LEADCHNL_RV_PACING_CAPTURE_MODE: NORMAL
MDC_IDC_SET_LEADCHNL_RV_PACING_CATHODE_ELECTRODE_1: NORMAL
MDC_IDC_SET_LEADCHNL_RV_PACING_CATHODE_LOCATION_1: NORMAL
MDC_IDC_SET_LEADCHNL_RV_PACING_POLARITY: NORMAL
MDC_IDC_SET_LEADCHNL_RV_PACING_PULSEWIDTH: 0.4 MS
MDC_IDC_SET_LEADCHNL_RV_SENSING_ANODE_ELECTRODE_1: NORMAL
MDC_IDC_SET_LEADCHNL_RV_SENSING_ANODE_LOCATION_1: NORMAL
MDC_IDC_SET_LEADCHNL_RV_SENSING_CATHODE_ELECTRODE_1: NORMAL
MDC_IDC_SET_LEADCHNL_RV_SENSING_CATHODE_LOCATION_1: NORMAL
MDC_IDC_SET_LEADCHNL_RV_SENSING_POLARITY: NORMAL
MDC_IDC_SET_LEADCHNL_RV_SENSING_SENSITIVITY: 0.9 MV
MDC_IDC_SET_ZONE_DETECTION_INTERVAL: 360 MS
MDC_IDC_SET_ZONE_TYPE: NORMAL
MDC_IDC_STAT_BRADY_DTM_END: NORMAL
MDC_IDC_STAT_BRADY_DTM_START: NORMAL
MDC_IDC_STAT_BRADY_RV_PERCENT_PACED: 19.77 %
MDC_IDC_STAT_EPISODE_RECENT_COUNT: 0
MDC_IDC_STAT_EPISODE_RECENT_COUNT: 0
MDC_IDC_STAT_EPISODE_RECENT_COUNT_DTM_END: NORMAL
MDC_IDC_STAT_EPISODE_RECENT_COUNT_DTM_END: NORMAL
MDC_IDC_STAT_EPISODE_RECENT_COUNT_DTM_START: NORMAL
MDC_IDC_STAT_EPISODE_RECENT_COUNT_DTM_START: NORMAL
MDC_IDC_STAT_EPISODE_TOTAL_COUNT: 0
MDC_IDC_STAT_EPISODE_TOTAL_COUNT: 0
MDC_IDC_STAT_EPISODE_TOTAL_COUNT_DTM_END: NORMAL
MDC_IDC_STAT_EPISODE_TOTAL_COUNT_DTM_END: NORMAL
MDC_IDC_STAT_EPISODE_TOTAL_COUNT_DTM_START: NORMAL
MDC_IDC_STAT_EPISODE_TOTAL_COUNT_DTM_START: NORMAL
MDC_IDC_STAT_EPISODE_TYPE: NORMAL

## 2020-07-30 PROCEDURE — 40000116 ZZH STATISTIC OP CR VISIT

## 2020-07-30 PROCEDURE — 93797 PHYS/QHP OP CAR RHAB WO ECG: CPT

## 2020-07-30 PROCEDURE — 40000575 ZZH STATISTIC OP CARDIAC VISIT #2

## 2020-07-30 PROCEDURE — 93798 PHYS/QHP OP CAR RHAB W/ECG: CPT

## 2020-07-30 NOTE — PROGRESS NOTES
Clinic Care Coordination Contact  [facility]/Voicemail    Clinical Data: Care Coordinator Outreach  Outreach attempted x 1.  Left message on patient's son's voicemail with call back information and requested return call.  Plan: Care Coordinator will try to reach patient again in 1-2 business days.

## 2020-07-30 NOTE — PROGRESS NOTES
Clinic Care Coordination Contact  Care Team Conversations    Care coordination referral entered from [facility] discharge report  Georgette Mahoney, JOSE  [facility] Primary Care-Care Coordination  [facility]  [facility]  [phone]

## 2020-07-30 NOTE — LETTER
July 30, 2020    Dear Shiraz,                                                                         You were recently referred to the [facility] Clinic Care Coordination service.  This is a service offered through your Primary Care Clinic which can help you access resources and services in regard to your health and well-being goals. The clinic Community Health Worker has placed two calls to your son Forrest to discuss the nature of this service and to offer enrollment.  If you are interested in learning more about Clinic Care Coordination, please call your Primary Care Clinic's Community Health Worker, Mandy, at [phone].                                                    Sincerely,                                                                              CIARAN Galvan                                                                                          Clinic Care Coordination                                                  [facility]

## 2020-07-30 NOTE — TELEPHONE ENCOUNTER
CHW to outreach to patient per care coordination standard work    Georgette Mahoney, RN  [facility] Primary Care-Care Coordination  [facility] Primary Care  [facility]  [phone]

## 2020-07-31 ENCOUNTER — HOSPITAL ENCOUNTER (OUTPATIENT)
Dept: CARDIAC REHAB | Facility: CLINIC | Age: 85
End: 2020-07-31
Attending: INTERNAL MEDICINE
Payer: COMMERCIAL

## 2020-07-31 PROCEDURE — T1013 SIGN LANG/ORAL INTERPRETER: HCPCS | Mod: U3

## 2020-07-31 PROCEDURE — 93798 PHYS/QHP OP CAR RHAB W/ECG: CPT | Performed by: CLINICAL EXERCISE PHYSIOLOGIST

## 2020-07-31 PROCEDURE — T1013 SIGN LANG/ORAL INTERPRETER: HCPCS | Mod: U3 | Performed by: INTERNAL MEDICINE

## 2020-07-31 PROCEDURE — 40000116 ZZH STATISTIC OP CR VISIT: Performed by: CLINICAL EXERCISE PHYSIOLOGIST

## 2020-07-31 NOTE — PROGRESS NOTES
Community Health Worker called and left a message for the patient.  If the patient is returning my call, please transfer the patient to [facility] at ext. [phone].   Patient has been mailed a unreachable letter and was provided with CHW contact information if they are interested in accessing Clinic Care Coordination.  Order for Care Management has been closed, no further outreach will be done at this time and patient can be re-referred.

## 2020-08-04 ENCOUNTER — HOSPITAL ENCOUNTER (OUTPATIENT)
Dept: CARDIAC REHAB | Facility: CLINIC | Age: 85
End: 2020-08-04
Attending: INTERNAL MEDICINE
Payer: COMMERCIAL

## 2020-08-04 PROCEDURE — 93798 PHYS/QHP OP CAR RHAB W/ECG: CPT | Performed by: REHABILITATION PRACTITIONER

## 2020-08-04 PROCEDURE — 40000116 ZZH STATISTIC OP CR VISIT: Performed by: REHABILITATION PRACTITIONER

## 2020-08-05 ENCOUNTER — OFFICE VISIT (OUTPATIENT)
Dept: FAMILY MEDICINE | Facility: CLINIC | Age: 85
End: 2020-08-05
Payer: COMMERCIAL

## 2020-08-05 VITALS
SYSTOLIC BLOOD PRESSURE: 130 MMHG | DIASTOLIC BLOOD PRESSURE: 60 MMHG | TEMPERATURE: 98.5 F | HEIGHT: 68 IN | WEIGHT: 149 LBS | RESPIRATION RATE: 16 BRPM | HEART RATE: 77 BPM | BODY MASS INDEX: 22.58 KG/M2 | OXYGEN SATURATION: 95 %

## 2020-08-05 DIAGNOSIS — I35.0 SEVERE AORTIC STENOSIS: ICD-10-CM

## 2020-08-05 DIAGNOSIS — I48.21 PERMANENT ATRIAL FIBRILLATION (H): ICD-10-CM

## 2020-08-05 DIAGNOSIS — I10 HYPERTENSION GOAL BP (BLOOD PRESSURE) < 140/90: Primary | ICD-10-CM

## 2020-08-05 DIAGNOSIS — I51.3 THROMBUS OF LEFT ATRIAL APPENDAGE: ICD-10-CM

## 2020-08-05 PROCEDURE — T1013 SIGN LANG/ORAL INTERPRETER: HCPCS | Mod: U3 | Performed by: FAMILY MEDICINE

## 2020-08-05 PROCEDURE — 99495 TRANSJ CARE MGMT MOD F2F 14D: CPT | Performed by: FAMILY MEDICINE

## 2020-08-05 RX ORDER — LISINOPRIL 20 MG/1
20 TABLET ORAL DAILY
Qty: 90 TABLET | Refills: 1 | Status: SHIPPED | OUTPATIENT
Start: 2020-08-05 | End: 2020-12-30

## 2020-08-05 ASSESSMENT — MIFFLIN-ST. JEOR: SCORE: 1335.36

## 2020-08-05 NOTE — PROGRESS NOTES
Subjective     Shiraz Ingram is a 85 year old male who presents to clinic today for the following health issues:    HPI     Hospital Follow-up Visit:    Hospital/Nursing Home/IP Rehab Facility: [facility]  Date of Admission: 07/27/2020  Date of Discharge: 07/29/2020  Reason(s) for Admission: Acute decompensated heart failure in the setting of mild heart failure with preserved ejection fraction      Was your hospitalization related to COVID-19? No   Problems taking medications regularly:  None  Medication changes since discharge: melatonin, furesomide, and potassium  Problems adhering to non-medication therapy:  None    Summary of hospitalization:  [facility] discharge summary reviewed  Diagnostic Tests/Treatments reviewed.  Follow up needed: none  Other Healthcare Providers Involved in Patient s Care:         cardiology  Update since discharge: improved.  Post Discharge Medication Reconciliation: discharge medications reconciled, continue medications without change.  Plan of care communicated with patient       Hypertension Follow-up      Do you check your blood pressure regularly outside of the clinic? Yes     Are you following a low salt diet? Yes    Are your blood pressures ever more than 140 on the top number (systolic) OR more   than 90 on the bottom number (diastolic), for example 140/90? No    History of cabg, gi bleed, recent tavr and worsening of breathing - went to ER and had heart failure and was hospitalized.     Still using water pill.   Scheduled to see cardiology next week. Will have labs and echo also. Will have cbc and bmp  Going for cardiac rehab 3 times per week.   No changes in rest of the medications.   Planning to resume his work next month. 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     Social History     Occupational History     Occupation: [employer]     Employer: RETIRED   Tobacco Use     Smoking status: Former Smoker     Types: Cigarettes  "    Smokeless tobacco: Never Used     Tobacco comment: smoked for a week in 20's   Substance and Sexual Activity     Alcohol use: Yes     Alcohol/week: 0.0 standard drinks     Drug use: No     Sexual activity: Yes     Partners: Female     Allergies   Allergen Reactions     No Known Drug Allergies      Patient Active Problem List   Diagnosis     Coronary atherosclerosis     Benign neoplasm of colon     Localized osteoarthrosis, lower leg     Allergic rhinitis     Moderate persistent asthma     Anemia     Osteoporosis     Hypertension goal BP (blood pressure) < 140/90     Hyperlipidemia with target LDL less than 100     CKD (chronic kidney disease) stage 3, GFR 30-59 ml/min (H)     Nonrheumatic aortic valve stenosis     Advanced directives, counseling/discussion     Iron deficiency anemia     Vitamin B 12 deficiency     Health Care Home     Mild persistent asthma without complication     Acute respiratory failure (H)     Acute on chronic congestive heart failure, unspecified heart failure type (H)     Severe aortic stenosis     Coronary artery disease involving native coronary artery of native heart without angina pectoris     S/P CABG (coronary artery bypass graft)     Hyperkalemia     GI bleed     Pneumonia     Atrial fibrillation (H)     Thrombus of left atrial appendage     Complete heart block (H)     Aortic stenosis, severe     Acute exacerbation of CHF (congestive heart failure) (H)     Reviewed medications, social history and  past medical and surgical history.    Review of system: for general, respiratory, CVS, GI and psychiatry negative except for noted above.     EXAM:  /60 (BP Location: Left arm, Patient Position: Sitting, Cuff Size: Adult Regular)   Pulse 77   Temp 98.5  F (36.9  C) (Oral)   Resp 16   Ht 1.727 m (5' 8\")   Wt 67.6 kg (149 lb)   SpO2 95%   BMI 22.66 kg/m    Constitutional: healthy, alert and no distress   Psychiatric: mentation appears normal and affect " normal/bright  Cardiovascular: irregular rhythm. No murmurs,  Respiratory: negative, Lungs clear. No crackles or wheezing. No tachypnea.      ASSESSMENT / PLAN:  (I10) Hypertension goal BP (blood pressure) < 140/90  (primary encounter diagnosis)  Comment:    Plan:  Patient is tolerating current medication without any major side effects of concerns and current dose seems reasonable too.  Current medication regime is effective. Continue current treatment without any changes.     (I35.0) Severe aortic stenosis  Comment:  S/p TAVR surgery and acute heart failure after surgery.  Was hospitalized again.  Currently on Lasix.  He is seeing cardiologist next week and we agreed to hold off on obtaining BMP.  We should keep an close eye on his renal function.  He understood.  Plan: lisinopril (ZESTRIL) 20 MG tablet           (I48.21) Permanent atrial fibrillation (H)  Comment: He is on Pradaxa.  Continue the same.  Plan: lisinopril (ZESTRIL) 20 MG tablet           (I51.3) Thrombus of left atrial appendage  Comment: On Pradaxa.  Continue the same.  Plan: lisinopril (ZESTRIL) 20 MG tablet     Seeing cardiologist next week and okay to hold off on blood test until then.  Also monitor his hemoglobin closely.  He is clinically feeling much better and we agreed to continue the same Rx until seen by cardiologist next week.    The above note was dictated using voice recognition. Although reviewed after completion, some word and grammatical error may remain .

## 2020-08-06 ENCOUNTER — HOSPITAL ENCOUNTER (OUTPATIENT)
Dept: CARDIAC REHAB | Facility: CLINIC | Age: 85
End: 2020-08-06
Attending: INTERNAL MEDICINE
Payer: COMMERCIAL

## 2020-08-06 ENCOUNTER — TELEPHONE (OUTPATIENT)
Dept: CARDIOLOGY | Facility: CLINIC | Age: 85
End: 2020-08-06

## 2020-08-06 PROCEDURE — 40000116 ZZH STATISTIC OP CR VISIT: Performed by: REHABILITATION PRACTITIONER

## 2020-08-06 PROCEDURE — 93798 PHYS/QHP OP CAR RHAB W/ECG: CPT | Performed by: REHABILITATION PRACTITIONER

## 2020-08-06 ASSESSMENT — ASTHMA QUESTIONNAIRES: ACT_TOTALSCORE: 20

## 2020-08-06 NOTE — TELEPHONE ENCOUNTER
Patient was evaluated by cardiology while inpatient for acute on chronic HF and uncontrolled HTN. PMH: Severe aortic stenosis-s/p TAVR and complicated by CHB and subsequent PPM implant 7/15/20, recent GIB, chronic anemia, CKD III, CAD with CABG, AF with MAYA thrombus-Pradaxa. IV diuresed 8 lbs. Started on Lasix and KCL prior to discharge. Writer attempted to call patient to discuss any post hospital d/c questions, review discharge medication, and confirm f/u appts-pt was not home so spoke with his son, Forrest. Son denied any questions regarding new or changes to PTA medications. Patient has not had any SOB, chest pain, edema, or light headedness. RN confirmed with son that pt has an OV appt scheduled with DIAZ Daksha Smith on 8/13/20 at 1310, with lab prior at 1220.  Patient is weighing self every AM, after waking and using the restroom, but before breakfast and medications. Pt to call clinic for a weight gain of 2 lbs overnight or 5 lbs in a week. Low Na+ diet encouraged. Son instructed to have pt bring daily wt/BP diary and medications with to f/u OV. Son states pt is at PMD OV now. Son advised to call clinic with any cardiac related questions or concerns prior to his appt, and he verbalized understanding and agreed with plan.  JAVID Ding RN,

## 2020-08-07 ENCOUNTER — HOSPITAL ENCOUNTER (OUTPATIENT)
Dept: CARDIAC REHAB | Facility: CLINIC | Age: 85
End: 2020-08-07
Attending: INTERNAL MEDICINE
Payer: COMMERCIAL

## 2020-08-07 ENCOUNTER — TELEPHONE (OUTPATIENT)
Dept: FAMILY MEDICINE | Facility: CLINIC | Age: 85
End: 2020-08-07

## 2020-08-07 PROCEDURE — 40000116 ZZH STATISTIC OP CR VISIT: Performed by: CLINICAL EXERCISE PHYSIOLOGIST

## 2020-08-07 PROCEDURE — 93798 PHYS/QHP OP CAR RHAB W/ECG: CPT | Performed by: CLINICAL EXERCISE PHYSIOLOGIST

## 2020-08-10 NOTE — TELEPHONE ENCOUNTER
Medication is not on patient current med list.  Spoke to patient's son Willi and he will need to look at the bottle when he gets home and call back with strength and directions.  Malathi Cohn RN

## 2020-08-11 ENCOUNTER — HOSPITAL ENCOUNTER (OUTPATIENT)
Dept: CARDIAC REHAB | Facility: CLINIC | Age: 85
End: 2020-08-11
Attending: INTERNAL MEDICINE
Payer: COMMERCIAL

## 2020-08-11 PROCEDURE — T1013 SIGN LANG/ORAL INTERPRETER: HCPCS | Mod: U3

## 2020-08-11 PROCEDURE — 40000116 ZZH STATISTIC OP CR VISIT: Performed by: OCCUPATIONAL THERAPIST

## 2020-08-11 PROCEDURE — 93798 PHYS/QHP OP CAR RHAB W/ECG: CPT | Performed by: OCCUPATIONAL THERAPIST

## 2020-08-12 ENCOUNTER — TELEPHONE (OUTPATIENT)
Dept: CARDIOLOGY | Facility: CLINIC | Age: 85
End: 2020-08-12

## 2020-08-12 NOTE — TELEPHONE ENCOUNTER

## 2020-08-13 ENCOUNTER — OFFICE VISIT (OUTPATIENT)
Dept: CARDIOLOGY | Facility: CLINIC | Age: 85
End: 2020-08-13
Attending: INTERNAL MEDICINE
Payer: COMMERCIAL

## 2020-08-13 ENCOUNTER — HOSPITAL ENCOUNTER (OUTPATIENT)
Dept: CARDIAC REHAB | Facility: CLINIC | Age: 85
End: 2020-08-13
Attending: INTERNAL MEDICINE
Payer: COMMERCIAL

## 2020-08-13 ENCOUNTER — HOSPITAL ENCOUNTER (OUTPATIENT)
Dept: CARDIOLOGY | Facility: CLINIC | Age: 85
Discharge: HOME | End: 2020-08-13
Attending: INTERNAL MEDICINE | Admitting: INTERNAL MEDICINE
Payer: COMMERCIAL

## 2020-08-13 VITALS
BODY MASS INDEX: 22.88 KG/M2 | HEART RATE: 50 BPM | TEMPERATURE: 98 F | DIASTOLIC BLOOD PRESSURE: 70 MMHG | HEIGHT: 68 IN | WEIGHT: 151 LBS | SYSTOLIC BLOOD PRESSURE: 148 MMHG

## 2020-08-13 DIAGNOSIS — I10 HYPERTENSION, UNSPECIFIED TYPE: ICD-10-CM

## 2020-08-13 DIAGNOSIS — Z95.2 S/P TAVR (TRANSCATHETER AORTIC VALVE REPLACEMENT): ICD-10-CM

## 2020-08-13 DIAGNOSIS — E87.70 HYPERVOLEMIA, UNSPECIFIED HYPERVOLEMIA TYPE: ICD-10-CM

## 2020-08-13 DIAGNOSIS — R06.00 DYSPNEA, UNSPECIFIED TYPE: Primary | ICD-10-CM

## 2020-08-13 LAB
ANION GAP SERPL CALCULATED.3IONS-SCNC: 12.9 MMOL/L (ref 6–17)
BUN SERPL-MCNC: 56 MG/DL (ref 7–30)
CALCIUM SERPL-MCNC: 9.7 MG/DL (ref 8.5–10.5)
CHLORIDE SERPL-SCNC: 106 MMOL/L (ref 98–107)
CO2 SERPL-SCNC: 24 MMOL/L (ref 23–29)
CREAT SERPL-MCNC: 2.08 MG/DL (ref 0.7–1.3)
ERYTHROCYTE [DISTWIDTH] IN BLOOD BY AUTOMATED COUNT: 17.3 % (ref 10–15)
GFR SERPL CREATININE-BSD FRML MDRD: 31 ML/MIN/{1.73_M2}
GLUCOSE SERPL-MCNC: 102 MG/DL (ref 70–105)
HCT VFR BLD AUTO: 32.4 % (ref 40–53)
HGB BLD-MCNC: 10.3 G/DL (ref 13.3–17.7)
MCH RBC QN AUTO: 21.9 PG (ref 26.5–33)
MCHC RBC AUTO-ENTMCNC: 31.8 G/DL (ref 31.5–36.5)
MCV RBC AUTO: 69 FL (ref 78–100)
PLATELET # BLD AUTO: 133 10E9/L (ref 150–450)
POTASSIUM SERPL-SCNC: 4.9 MMOL/L (ref 3.5–5.1)
RBC # BLD AUTO: 4.7 10E12/L (ref 4.4–5.9)
SODIUM SERPL-SCNC: 138 MMOL/L (ref 136–145)
WBC # BLD AUTO: 3.9 10E9/L (ref 4–11)

## 2020-08-13 PROCEDURE — T1013 SIGN LANG/ORAL INTERPRETER: HCPCS | Mod: U3

## 2020-08-13 PROCEDURE — 36415 COLL VENOUS BLD VENIPUNCTURE: CPT | Performed by: INTERNAL MEDICINE

## 2020-08-13 PROCEDURE — 93000 ELECTROCARDIOGRAM COMPLETE: CPT | Performed by: NURSE PRACTITIONER

## 2020-08-13 PROCEDURE — 85027 COMPLETE CBC AUTOMATED: CPT | Performed by: INTERNAL MEDICINE

## 2020-08-13 PROCEDURE — 99214 OFFICE O/P EST MOD 30 MIN: CPT | Performed by: NURSE PRACTITIONER

## 2020-08-13 PROCEDURE — 40000116 ZZH STATISTIC OP CR VISIT: Performed by: REHABILITATION PRACTITIONER

## 2020-08-13 PROCEDURE — T1013 SIGN LANG/ORAL INTERPRETER: HCPCS | Mod: U3 | Performed by: NURSE PRACTITIONER

## 2020-08-13 PROCEDURE — 93306 TTE W/DOPPLER COMPLETE: CPT | Mod: 26 | Performed by: INTERNAL MEDICINE

## 2020-08-13 PROCEDURE — 80048 BASIC METABOLIC PNL TOTAL CA: CPT | Performed by: INTERNAL MEDICINE

## 2020-08-13 PROCEDURE — 93798 PHYS/QHP OP CAR RHAB W/ECG: CPT | Performed by: REHABILITATION PRACTITIONER

## 2020-08-13 PROCEDURE — 93306 TTE W/DOPPLER COMPLETE: CPT

## 2020-08-13 ASSESSMENT — MIFFLIN-ST. JEOR: SCORE: 1344.43

## 2020-08-13 NOTE — LETTER
8/13/2020    Dany Rodriguez MD, MD  [address] [place] [id number]    RE: Shiraz Ingram       Dear Colleague,    I had the pleasure of seeing Shiraz Ingram in the [facility].    KCCQ done at office visit  KCCQ Results:   1a. 5  1b. 5  1c. 3  2. 4  3. 6  4. 6  5. 4  6. 4  7. 5  8a. 4  8b. 4  8c. 4    .Rickey Tolentino RN      [facility]  Post-TAVR Clinic Visit      HPI  I had the pleasure of seeing Shiraz Ingram today in cardiology clinic follow up. He is a pleasant 85 year old patient of Dr. Field.     Mr. Ingram has a past medical history significant for severe aortic stenosis, status post recent TAVR 7/14/20, Complete heart block following TAVR status post dual-chamber pacemaker, CAD s/p CABG (LIMA to LAD, vein graft to OM and RCA), atrial fibrillation w/ MAYA thrombus on chronic anticoagulation, hearing loss, chromic anemia, chronic diastolic heart failure, hypertension, asthma, chronic kidney disease stage III, and recent GI bleed requiring transfusion.     He underwent elective TAVR, receiving 29 mm Medtronic Evolut Pro Plus valve with POD # 1 echo showing 5 mmHg with preserved LVEF. His antiplatelet regimen included pradaxa and aspirin. He unfortunately developed CHB, requiring PPM implantation.    Unfortunately, he returned to the hospital several days later for CHF exacerbation. Limited echocardiogram 7/28/2020 shows an LVEF of 60 to 65% with normal RV function.  NT proBNP elevated at over 4700 and chest x-ray showing mild pulmonary vascular prominence. He diuresed well with IV Lasix 40 mg and later transitioned to oral lasix.     Today he presents for his one month post TAVR procedure. He is doing well without shortness of breath or chest pain. He is tolerating his medications without side effects.     ECG: paced    ECHOCARDIOGRAM  EF: 50-55%   MEAN GRADIENT: 6 mmHg  AORTIC INSUFFICIENCY: trace  PVL    NYHA CLASS: Class  2      Assessment and Plan   1. Severe  aortic stenosis status post TAVR with a 29 mm Medtronic evolute valve. Echo shows a well seated TAVR valve with a mean gradient of 6 mmHg. Follow-up for the 6 mo post TAVR visit.     2. HFpEF with LVEF 60-65%. He is doing well without symptoms of heart failure. I have asked him to continue his current lasix dose.    3. Complete heart block post TAVR status post dual-chamber PPM implant.    4. Chronic atrial fibrillation with history of left atrial appendage. Chronic anticoagulation with pradaxa.     5. Chronic kidney disease with a baseline creatine near 1.4.     Thank you for allowing me to care for Shiraz Ingram today.    Daksha Smith, APRN, CNP  Cardiology    This note was completed in part using Dragon voice recognition software. Although reviewed after completion, some word and grammatical errors may occur.    PAST MEDICAL HISTORY:  Past Medical History:   Diagnosis Date     Allergic rhinitis, cause unspecified      Anemia, unspecified      Aortic stenosis     severe     Benign neoplasm of colon 1999    Ademonatous polyp     CKD (chronic kidney disease) stage 3, GFR 30-59 ml/min (H) 5/12/2011     Coronary atherosclerosis of unspecified type of vessel, native or graft     s/p CABG 2002     Hyperlipidemia LDL goal < 100      Hypertension goal BP (blood pressure) < 140/90      Localized osteoarthrosis not specified whether primary or secondary, lower leg     left knee     Moderate persistent asthma      Persistent atrial fibrillation (H)      Unspecified hearing loss        CURRENT MEDICATIONS:  Current Outpatient Medications   Medication Sig Dispense Refill     albuterol (PROAIR HFA/PROVENTIL HFA/VENTOLIN HFA) 108 (90 Base) MCG/ACT inhaler Inhale 2 puffs into the lungs 4 times daily as needed for shortness of breath / dyspnea or wheezing       aspirin (ASA) 81 MG EC tablet Take 81 mg by mouth daily       budesonide (PULMICORT) 0.5 MG/2ML nebulizer solution Take 2 mLs (0.5 mg) by nebulization 2 times daily 120 mL 0      calcium carbonate 600 mg-vitamin D 400 units (CALTRATE) 600-400 MG-UNIT per tablet Take 1 tablet by mouth daily       dabigatran ANTICOAGULANT (PRADAXA) 150 MG capsule Take 1 capsule (150 mg) by mouth 2 times daily Store in original 's bottle or blister pack; use within 120 days of opening. 180 capsule 3     ferrous sulfate (IRON) 325 (65 Fe) MG tablet TAKE 1 TABLET(325 MG) BY MOUTH TWICE DAILY 180 tablet 0     ipratropium - albuterol 0.5 mg/2.5 mg/3 mL (DUONEB) 0.5-2.5 (3) MG/3ML nebulization Inhale 1 vial (3 mLs) into the lungs 4 times daily 360 mL 11     lisinopril (ZESTRIL) 20 MG tablet Take 1 tablet (20 mg) by mouth daily 90 tablet 1     melatonin 3 MG tablet Take 1 tablet (3 mg) by mouth nightly as needed for sleep 30 tablet 0     metoprolol tartrate (LOPRESSOR) 25 MG tablet Take 1 tablet (25 mg) by mouth 2 times daily 60 tablet 0     montelukast (SINGULAIR) 10 MG tablet TAKE 1 TABLET(10 MG) BY MOUTH DAILY 90 tablet 1     multivitamin, therapeutic (THERA-VIT) TABS tablet Take 1 tablet by mouth every evening        nitroGLYcerin (NITROSTAT) 0.4 MG sublingual tablet For chest pain place 1 tablet under the tongue every 5 minutes for 3 doses. If symptoms persist 5 minutes after 1st dose call 911. 12 tablet 0     simvastatin (ZOCOR) 40 MG tablet TAKE 1 TABLET(40 MG) BY MOUTH AT BEDTIME 90 tablet 1     furosemide (LASIX) 20 MG tablet Take 1 tablet (20 mg) by mouth daily 90 tablet 0     pantoprazole (PROTONIX) 40 MG EC tablet TAKE ONE TABLET BY MOUTH TWICE A DAY BEFORE MEALS 60 tablet 3     potassium chloride ER (KLOR-CON M) 10 MEQ CR tablet Take 1 tablet (10 mEq) by mouth daily 90 tablet 0       PAST SURGICAL HISTORY:  Past Surgical History:   Procedure Laterality Date     C NONSPECIFIC PROCEDURE  5/02    Coronary artery bypass     CARDIAC SURGERY       COLONOSCOPY N/A 5/26/2020    Procedure: COLONOSCOPY;  Surgeon: Ignacio Fournier MD;  Location:  GI     CV ANGIOGRAM CORONARY GRAFT N/A  2020    Procedure: Angiogram Coronary Graft;  Surgeon: Addison Willard MD;  Location:  HEART CARDIAC CATH LAB     CV CORONARY ANGIOGRAM N/A 2020    Procedure: Coronary Angiogram;  Surgeon: Addison Willard MD;  Location:  HEART CARDIAC CATH LAB     CV RIGHT HEART CATH N/A 2020    Procedure: Right Heart Cath;  Surgeon: Addison Willard MD;  Location:  HEART CARDIAC CATH LAB     CV TRANSCATHETER AORTIC VALVE REPLACEMENT N/A 2020    Procedure: Transcatheter Aortic Valve Replacement;  Surgeon: Soraida Monet MD;  Location:  HEART CARDIAC CATH LAB     EP PACEMAKER N/A 7/15/2020    Procedure: EP Pacemaker;  Surgeon: Tommie Sauer MD;  Location:  HEART CARDIAC CATH LAB     HC COLONOSCOPY THRU STOMA W BIOPSY/CAUTERY TUMOR/POLYP/LESION      Dr. Dow, Q 5 years     HC COLONOSCOPY THRU STOMA W BIOPSY/CAUTERY TUMOR/POLYP/LESION      Dr. Dow, one adenomatous polyp     HC ESOPHAGOSCOPY, DIAGNOSTIC      Dr. Dow, lower esophageal ring & mild gastritis     HERNIORRHAPHY INGUINAL Right 2016    Procedure: HERNIORRHAPHY INGUINAL;  Surgeon: Alexis Alexandra MD;  Location: [facility]     LAPAROSCOPIC CHOLECYSTECTOMY      for biliary sludge       ALLERGIES     Allergies   Allergen Reactions     No Known Drug Allergies        FAMILY HISTORY:  Family History   Problem Relation Age of Onset     C.A.D. Father      Cancer Mother         breast cancer,  of pneumonia     Cerebrovascular Disease Son      CABG Son      Family History Negative Child      Family History Negative Sister      Heart Disease Brother        SOCIAL HISTORY:  Social History     Socioeconomic History     Marital status:      Spouse name: Kailey     Number of children: 3     Years of education: None     Highest education level: None   Occupational History     Occupation: [employer]     Employer: RETIRED   Social Needs     Financial resource strain: None     Food  insecurity     Worry: None     Inability: None     Transportation needs     Medical: None     Non-medical: None   Tobacco Use     Smoking status: Former Smoker     Types: Cigarettes     Smokeless tobacco: Never Used     Tobacco comment: smoked for a week in 20's   Substance and Sexual Activity     Alcohol use: Yes     Alcohol/week: 0.0 standard drinks     Drug use: No     Sexual activity: Yes     Partners: Female   Lifestyle     Physical activity     Days per week: None     Minutes per session: None     Stress: None   Relationships     Social connections     Talks on phone: None     Gets together: None     Attends Baptism service: None     Active member of club or organization: None     Attends meetings of clubs or organizations: None     Relationship status: None     Intimate partner violence     Fear of current or ex partner: None     Emotionally abused: None     Physically abused: None     Forced sexual activity: None   Other Topics Concern      Service No     Blood Transfusions No     Caffeine Concern Not Asked     Occupational Exposure Not Asked     Hobby Hazards Not Asked     Sleep Concern Not Asked     Stress Concern Not Asked     Weight Concern Not Asked     Special Diet Not Asked     Back Care Not Asked     Exercise Yes     Bike Helmet Not Asked     Seat Belt Yes     Self-Exams No     Parent/sibling w/ CABG, MI or angioplasty before 65F 55M? Yes   Social History Narrative    Balanced Diet - Yes    Osteoporosis Preventative measures-  Dairy servings per day: 2 to 3 servings daily    Regular Exercise -  Yes Describe walks 1.5 mile daily     Dental Exam up - YES - Date: 2 years ago    Eye Exam - YES - Date: 1 year ago    Self Testicular Exam -  No, handout given    Do you have any concerns about STD's -  No    Abuse: Current or Past (Physical, Sexual or Emotional)- No    Do you feel safe in your environment - Yes    Guns stored in the home - Yes, locked    Sunscreen used - No    Seatbelts used - Yes  "   Lipids - YES - Date: 3-09    Glucose -  YES - Date: 3-09    Colon Cancer Screening - Colonoscopy 3-08(date completed)    Hemoccults - UNKNOWN    PSA - YES - Date: 11-07    Digital Rectal Exam - UNKNOWN    Immunizations reviewed and up to date - Yes, td 1-2005, had shingles vaccine    5-28-09  JANEY Patel CMA                         Review of Systems:  Skin:  Negative     Eyes:  Positive for glasses  ENT:  Negative    Respiratory:  Negative    Cardiovascular:  Negative    Gastroenterology: Negative    Genitourinary:  not assessed    Musculoskeletal:  Negative    Neurologic:  Negative    Psychiatric:  Negative    Heme/Lymph/Imm:  Negative    Endocrine:  Negative      EXAM:  BP (!) 148/70   Pulse 50   Temp 98  F (36.7  C)   Ht 1.727 m (5' 8\")   Wt 68.5 kg (151 lb)   BMI 22.96 kg/m    Physical Exam:  Vitals: BP (!) 148/70   Pulse 50   Temp 98  F (36.7  C)   Ht 1.727 m (5' 8\")   Wt 68.5 kg (151 lb)   BMI 22.96 kg/m      Constitutional:           Skin:           Head:           Eyes:           ENT:           Neck:           Chest:           Cardiac:                    Abdomen:           Vascular:                                        Extremities and Back:           Neurological:           Labs:  LIPID RESULTS:  Lab Results   Component Value Date    CHOL 119 06/01/2020    HDL 48 06/01/2020    LDL 54 06/01/2020    TRIG 87 06/01/2020    CHOLHDLRATIO 3.0 07/07/2015       LIVER ENZYME RESULTS:  Lab Results   Component Value Date    AST 20 07/14/2020    ALT 21 07/14/2020       CBC RESULTS:  Lab Results   Component Value Date    WBC 3.9 (L) 08/13/2020    RBC 4.70 08/13/2020    HGB 10.3 (L) 08/13/2020    HCT 32.4 (L) 08/13/2020    MCV 69 (L) 08/13/2020    MCH 21.9 (L) 08/13/2020    MCHC 31.8 08/13/2020    RDW 17.3 (H) 08/13/2020     (L) 08/13/2020       BMP RESULTS:  Lab Results   Component Value Date     08/13/2020    POTASSIUM 4.9 08/13/2020    CHLORIDE 106 08/13/2020    CO2 24 08/13/2020    ANIONGAP " 12.9 08/13/2020     08/13/2020    BUN 56 (H) 08/13/2020    CR 2.08 (H) 08/13/2020    GFRESTIMATED 31 (L) 08/13/2020    GFRESTBLACK 37 (L) 08/13/2020    AYALA 9.7 08/13/2020        A1C RESULTS:  No results found for: A1C    INR RESULTS:  Lab Results   Component Value Date    INR 1.21 (H) 07/13/2020    INR 1.25 (H) 06/03/2020         [unfilled]      Thank you for allowing me to participate in the care of your patient.    Sincerely,     EVA Gomez [facility]

## 2020-08-14 ENCOUNTER — HOSPITAL ENCOUNTER (OUTPATIENT)
Dept: CARDIAC REHAB | Facility: CLINIC | Age: 85
End: 2020-08-14
Attending: INTERNAL MEDICINE
Payer: COMMERCIAL

## 2020-08-14 PROCEDURE — 40000116 ZZH STATISTIC OP CR VISIT: Performed by: OCCUPATIONAL THERAPIST

## 2020-08-14 PROCEDURE — 93798 PHYS/QHP OP CAR RHAB W/ECG: CPT | Performed by: OCCUPATIONAL THERAPIST

## 2020-08-14 PROCEDURE — T1013 SIGN LANG/ORAL INTERPRETER: HCPCS | Mod: U3

## 2020-08-14 NOTE — PROGRESS NOTES
KCCQ done at office visit  KCCQ Results:   1a. 5  1b. 5  1c. 3  2. 4  3. 6  4. 6  5. 4  6. 4  7. 5  8a. 4  8b. 4  8c. 4    .Rickey Tolentino RN

## 2020-08-17 ENCOUNTER — HOSPITAL ENCOUNTER (OUTPATIENT)
Dept: CARDIAC REHAB | Facility: CLINIC | Age: 85
End: 2020-08-17
Attending: INTERNAL MEDICINE
Payer: COMMERCIAL

## 2020-08-17 PROCEDURE — 93798 PHYS/QHP OP CAR RHAB W/ECG: CPT | Performed by: REHABILITATION PRACTITIONER

## 2020-08-17 PROCEDURE — T1013 SIGN LANG/ORAL INTERPRETER: HCPCS | Mod: U3

## 2020-08-17 PROCEDURE — 40000116 ZZH STATISTIC OP CR VISIT: Performed by: REHABILITATION PRACTITIONER

## 2020-08-19 ENCOUNTER — HOSPITAL ENCOUNTER (OUTPATIENT)
Dept: CARDIAC REHAB | Facility: CLINIC | Age: 85
End: 2020-08-19
Attending: INTERNAL MEDICINE
Payer: COMMERCIAL

## 2020-08-19 PROCEDURE — 93798 PHYS/QHP OP CAR RHAB W/ECG: CPT

## 2020-08-19 PROCEDURE — T1013 SIGN LANG/ORAL INTERPRETER: HCPCS | Mod: U3

## 2020-08-19 PROCEDURE — 40000116 ZZH STATISTIC OP CR VISIT

## 2020-08-21 ENCOUNTER — HOSPITAL ENCOUNTER (OUTPATIENT)
Dept: CARDIAC REHAB | Facility: CLINIC | Age: 85
End: 2020-08-21
Attending: INTERNAL MEDICINE
Payer: COMMERCIAL

## 2020-08-21 PROCEDURE — 40000116 ZZH STATISTIC OP CR VISIT: Performed by: CLINICAL EXERCISE PHYSIOLOGIST

## 2020-08-21 PROCEDURE — T1013 SIGN LANG/ORAL INTERPRETER: HCPCS | Mod: U3

## 2020-08-21 PROCEDURE — 93798 PHYS/QHP OP CAR RHAB W/ECG: CPT | Performed by: CLINICAL EXERCISE PHYSIOLOGIST

## 2020-08-24 ENCOUNTER — HOSPITAL ENCOUNTER (OUTPATIENT)
Dept: CARDIAC REHAB | Facility: CLINIC | Age: 85
End: 2020-08-24
Attending: INTERNAL MEDICINE
Payer: COMMERCIAL

## 2020-08-24 PROCEDURE — T1013 SIGN LANG/ORAL INTERPRETER: HCPCS | Mod: U3

## 2020-08-24 PROCEDURE — 40000116 ZZH STATISTIC OP CR VISIT: Performed by: CLINICAL EXERCISE PHYSIOLOGIST

## 2020-08-24 PROCEDURE — 93798 PHYS/QHP OP CAR RHAB W/ECG: CPT | Performed by: CLINICAL EXERCISE PHYSIOLOGIST

## 2020-08-25 DIAGNOSIS — I50.23 ACUTE ON CHRONIC SYSTOLIC CONGESTIVE HEART FAILURE (H): ICD-10-CM

## 2020-08-26 ENCOUNTER — TELEPHONE (OUTPATIENT)
Dept: FAMILY MEDICINE | Facility: CLINIC | Age: 85
End: 2020-08-26

## 2020-08-26 ENCOUNTER — HOSPITAL ENCOUNTER (OUTPATIENT)
Dept: CARDIAC REHAB | Facility: CLINIC | Age: 85
End: 2020-08-26
Attending: INTERNAL MEDICINE
Payer: COMMERCIAL

## 2020-08-26 DIAGNOSIS — I50.23 ACUTE ON CHRONIC SYSTOLIC CONGESTIVE HEART FAILURE (H): ICD-10-CM

## 2020-08-26 PROCEDURE — 40000116 ZZH STATISTIC OP CR VISIT: Performed by: REHABILITATION PRACTITIONER

## 2020-08-26 PROCEDURE — 93798 PHYS/QHP OP CAR RHAB W/ECG: CPT | Performed by: REHABILITATION PRACTITIONER

## 2020-08-26 PROCEDURE — T1013 SIGN LANG/ORAL INTERPRETER: HCPCS | Mod: U3

## 2020-08-26 RX ORDER — POTASSIUM CHLORIDE 750 MG/1
10 TABLET, EXTENDED RELEASE ORAL DAILY
Qty: 90 TABLET | Refills: 0 | Status: ON HOLD | OUTPATIENT
Start: 2020-08-26 | End: 2020-10-01

## 2020-08-26 RX ORDER — POTASSIUM CHLORIDE 750 MG/1
TABLET, EXTENDED RELEASE ORAL
Qty: 30 TABLET | Refills: 0 | OUTPATIENT
Start: 2020-08-26

## 2020-08-26 RX ORDER — FUROSEMIDE 20 MG
TABLET ORAL
Qty: 30 TABLET | Refills: 0 | OUTPATIENT
Start: 2020-08-26

## 2020-08-26 RX ORDER — FUROSEMIDE 20 MG
20 TABLET ORAL DAILY
Qty: 90 TABLET | Refills: 0 | Status: ON HOLD | OUTPATIENT
Start: 2020-08-26 | End: 2020-10-01

## 2020-08-26 NOTE — TELEPHONE ENCOUNTER
Pharmacy calling to find out status of these refills.  Patient wants refills as soon as possible.    Pharmacy:  [phone] (Option 2)

## 2020-08-26 NOTE — TELEPHONE ENCOUNTER
Reason for Call:  Other prescription    Detailed comments: patient's son Willi is calling to get answers for his father regarding why they are not filling his medications Potassium Chloride, Furosemide    Phone Number Patient can be reached at: Other phone number:  [phone]    Best Time: any    Can we leave a detailed message on this number? YES    Call taken on 8/26/2020 at 9:51 AM by Philomena Martinez

## 2020-08-26 NOTE — TELEPHONE ENCOUNTER
Message sent to pharmacy - Refusal reason: Originating/Specialty Provider to approve (CARDS IS MANAGING. I SPOKE WITH NISHA AT PHARMACY.) .  Brennan GARCIA

## 2020-08-26 NOTE — TELEPHONE ENCOUNTER
Discussed with son that these refills are to be done through cardiology  Thanks!     Betsey Walker RN

## 2020-08-27 NOTE — PROGRESS NOTES
[facility]  Post-TAVR Clinic Visit      HPI  I had the pleasure of seeing Shiraz Ingram today in cardiology clinic follow up. He is a pleasant 85 year old patient of Dr. Field.     Mr. Ingram has a past medical history significant for severe aortic stenosis, status post recent TAVR 7/14/20, Complete heart block following TAVR status post dual-chamber pacemaker, CAD s/p CABG (LIMA to LAD, vein graft to OM and RCA), atrial fibrillation w/ MAYA thrombus on chronic anticoagulation, hearing loss, chromic anemia, chronic diastolic heart failure, hypertension, asthma, chronic kidney disease stage III, and recent GI bleed requiring transfusion.     He underwent elective TAVR, receiving 29 mm Medtronic Evolut Pro Plus valve with POD # 1 echo showing 5 mmHg with preserved LVEF. His antiplatelet regimen included pradaxa and aspirin. He unfortunately developed CHB, requiring PPM implantation.    Unfortunately, he returned to the hospital several days later for CHF exacerbation. Limited echocardiogram 7/28/2020 shows an LVEF of 60 to 65% with normal RV function.  NT proBNP elevated at over 4700 and chest x-ray showing mild pulmonary vascular prominence. He diuresed well with IV Lasix 40 mg and later transitioned to oral lasix.     Today he presents for his one month post TAVR procedure. He is doing well without shortness of breath or chest pain. He is tolerating his medications without side effects.     ECG: paced    ECHOCARDIOGRAM  EF: 50-55%   MEAN GRADIENT: 6 mmHg  AORTIC INSUFFICIENCY: trace  PVL    NYHA CLASS: Class  2      Assessment and Plan   1. Severe aortic stenosis status post TAVR with a 29 mm Medtronic evolute valve. Echo shows a well seated TAVR valve with a mean gradient of 6 mmHg. Follow-up for the 6 mo post TAVR visit.     2. HFpEF with LVEF 60-65%. He is doing well without symptoms of heart failure. I have asked him to continue his current lasix dose.    3. Complete heart block post TAVR status post  dual-chamber PPM implant.    4. Chronic atrial fibrillation with history of left atrial appendage. Chronic anticoagulation with pradaxa.     5. Chronic kidney disease with a baseline creatine near 1.4.     Thank you for allowing me to care for Shiraz Ingram today.    EVA Gomez, CNP  Cardiology    This note was completed in part using Dragon voice recognition software. Although reviewed after completion, some word and grammatical errors may occur.    PAST MEDICAL HISTORY:  Past Medical History:   Diagnosis Date     Allergic rhinitis, cause unspecified      Anemia, unspecified      Aortic stenosis     severe     Benign neoplasm of colon 1999    Ademonatous polyp     CKD (chronic kidney disease) stage 3, GFR 30-59 ml/min (H) 5/12/2011     Coronary atherosclerosis of unspecified type of vessel, native or graft     s/p CABG 2002     Hyperlipidemia LDL goal < 100      Hypertension goal BP (blood pressure) < 140/90      Localized osteoarthrosis not specified whether primary or secondary, lower leg     left knee     Moderate persistent asthma      Persistent atrial fibrillation (H)      Unspecified hearing loss        CURRENT MEDICATIONS:  Current Outpatient Medications   Medication Sig Dispense Refill     albuterol (PROAIR HFA/PROVENTIL HFA/VENTOLIN HFA) 108 (90 Base) MCG/ACT inhaler Inhale 2 puffs into the lungs 4 times daily as needed for shortness of breath / dyspnea or wheezing       aspirin (ASA) 81 MG EC tablet Take 81 mg by mouth daily       budesonide (PULMICORT) 0.5 MG/2ML nebulizer solution Take 2 mLs (0.5 mg) by nebulization 2 times daily 120 mL 0     calcium carbonate 600 mg-vitamin D 400 units (CALTRATE) 600-400 MG-UNIT per tablet Take 1 tablet by mouth daily       dabigatran ANTICOAGULANT (PRADAXA) 150 MG capsule Take 1 capsule (150 mg) by mouth 2 times daily Store in original 's bottle or blister pack; use within 120 days of opening. 180 capsule 3     ferrous sulfate (IRON) 325 (65 Fe) MG  tablet TAKE 1 TABLET(325 MG) BY MOUTH TWICE DAILY 180 tablet 0     ipratropium - albuterol 0.5 mg/2.5 mg/3 mL (DUONEB) 0.5-2.5 (3) MG/3ML nebulization Inhale 1 vial (3 mLs) into the lungs 4 times daily 360 mL 11     lisinopril (ZESTRIL) 20 MG tablet Take 1 tablet (20 mg) by mouth daily 90 tablet 1     melatonin 3 MG tablet Take 1 tablet (3 mg) by mouth nightly as needed for sleep 30 tablet 0     metoprolol tartrate (LOPRESSOR) 25 MG tablet Take 1 tablet (25 mg) by mouth 2 times daily 60 tablet 0     montelukast (SINGULAIR) 10 MG tablet TAKE 1 TABLET(10 MG) BY MOUTH DAILY 90 tablet 1     multivitamin, therapeutic (THERA-VIT) TABS tablet Take 1 tablet by mouth every evening        nitroGLYcerin (NITROSTAT) 0.4 MG sublingual tablet For chest pain place 1 tablet under the tongue every 5 minutes for 3 doses. If symptoms persist 5 minutes after 1st dose call 911. 12 tablet 0     simvastatin (ZOCOR) 40 MG tablet TAKE 1 TABLET(40 MG) BY MOUTH AT BEDTIME 90 tablet 1     furosemide (LASIX) 20 MG tablet Take 1 tablet (20 mg) by mouth daily 90 tablet 0     pantoprazole (PROTONIX) 40 MG EC tablet TAKE ONE TABLET BY MOUTH TWICE A DAY BEFORE MEALS 60 tablet 3     potassium chloride ER (KLOR-CON M) 10 MEQ CR tablet Take 1 tablet (10 mEq) by mouth daily 90 tablet 0       PAST SURGICAL HISTORY:  Past Surgical History:   Procedure Laterality Date     C NONSPECIFIC PROCEDURE  5/02    Coronary artery bypass     CARDIAC SURGERY       COLONOSCOPY N/A 5/26/2020    Procedure: COLONOSCOPY;  Surgeon: Ignacio Fournier MD;  Location:  GI     CV ANGIOGRAM CORONARY GRAFT N/A 4/24/2020    Procedure: Angiogram Coronary Graft;  Surgeon: Addison Willard MD;  Location:  HEART CARDIAC CATH LAB     CV CORONARY ANGIOGRAM N/A 4/24/2020    Procedure: Coronary Angiogram;  Surgeon: Addison Willard MD;  Location:  HEART CARDIAC CATH LAB     CV RIGHT HEART CATH N/A 4/24/2020    Procedure: Right Heart Cath;  Surgeon: Addison Willard  MD Boo;  Location:  HEART CARDIAC CATH LAB     CV TRANSCATHETER AORTIC VALVE REPLACEMENT N/A 2020    Procedure: Transcatheter Aortic Valve Replacement;  Surgeon: Soraida Monet MD;  Location:  HEART CARDIAC CATH LAB     EP PACEMAKER N/A 7/15/2020    Procedure: EP Pacemaker;  Surgeon: Tommie Sauer MD;  Location:  HEART CARDIAC CATH LAB     HC COLONOSCOPY THRU STOMA W BIOPSY/CAUTERY TUMOR/POLYP/LESION      Dr. Dow, Q 5 years     HC COLONOSCOPY THRU STOMA W BIOPSY/CAUTERY TUMOR/POLYP/LESION      Dr. Dow, one adenomatous polyp     HC ESOPHAGOSCOPY, DIAGNOSTIC      Dr. Dow, lower esophageal ring & mild gastritis     HERNIORRHAPHY INGUINAL Right 2016    Procedure: HERNIORRHAPHY INGUINAL;  Surgeon: Alexis Alexandra MD;  [facility]: [facility]     LAPAROSCOPIC CHOLECYSTECTOMY      for biliary sludge       ALLERGIES     Allergies   Allergen Reactions     No Known Drug Allergies        FAMILY HISTORY:  Family History   Problem Relation Age of Onset     C.A.D. Father      Cancer Mother         breast cancer,  of pneumonia     Cerebrovascular Disease Son      CABG Son      Family History Negative Child      Family History Negative Sister      Heart Disease Brother        SOCIAL HISTORY:  Social History     Socioeconomic History     Marital status:      Spouse name: Kailey     Number of children: 3     Years of education: None     Highest education level: None   Occupational History     Occupation: [employer]     Employer: RETIRED   Social Needs     Financial resource strain: None     Food insecurity     Worry: None     Inability: None     Transportation needs     Medical: None     Non-medical: None   Tobacco Use     Smoking status: Former Smoker     Types: Cigarettes     Smokeless tobacco: Never Used     Tobacco comment: smoked for a week in    Substance and Sexual Activity     Alcohol use: Yes     Alcohol/week: 0.0 standard drinks     Drug use: No      Sexual activity: Yes     Partners: Female   Lifestyle     Physical activity     Days per week: None     Minutes per session: None     Stress: None   Relationships     Social connections     Talks on phone: None     Gets together: None     Attends Zoroastrianism service: None     Active member of club or organization: None     Attends meetings of clubs or organizations: None     Relationship status: None     Intimate partner violence     Fear of current or ex partner: None     Emotionally abused: None     Physically abused: None     Forced sexual activity: None   Other Topics Concern      Service No     Blood Transfusions No     Caffeine Concern Not Asked     Occupational Exposure Not Asked     Hobby Hazards Not Asked     Sleep Concern Not Asked     Stress Concern Not Asked     Weight Concern Not Asked     Special Diet Not Asked     Back Care Not Asked     Exercise Yes     Bike Helmet Not Asked     Seat Belt Yes     Self-Exams No     Parent/sibling w/ CABG, MI or angioplasty before 65F 55M? Yes   Social History Narrative    Balanced Diet - Yes    Osteoporosis Preventative measures-  Dairy servings per day: 2 to 3 servings daily    Regular Exercise -  Yes Describe walks 1.5 mile daily     Dental Exam up - YES - Date: 2 years ago    Eye Exam - YES - Date: 1 year ago    Self Testicular Exam -  No, handout given    Do you have any concerns about STD's -  No    Abuse: Current or Past (Physical, Sexual or Emotional)- No    Do you feel safe in your environment - Yes    Guns stored in the home - Yes, locked    Sunscreen used - No    Seatbelts used - Yes    Lipids - YES - Date: 3-09    Glucose -  YES - Date: 3-09    Colon Cancer Screening - Colonoscopy 3-08(date completed)    Hemoccults - UNKNOWN    PSA - YES - Date: 11-07    Digital Rectal Exam - UNKNOWN    Immunizations reviewed and up to date - Yes, td 1-2005, had shingles vaccine    5-28-09  JANEY Patel CMA                         Review of Systems:  Skin:  Negative    "  Eyes:  Positive for glasses  ENT:  Negative    Respiratory:  Negative    Cardiovascular:  Negative    Gastroenterology: Negative    Genitourinary:  not assessed    Musculoskeletal:  Negative    Neurologic:  Negative    Psychiatric:  Negative    Heme/Lymph/Imm:  Negative    Endocrine:  Negative      EXAM:  BP (!) 148/70   Pulse 50   Temp 98  F (36.7  C)   Ht 1.727 m (5' 8\")   Wt 68.5 kg (151 lb)   BMI 22.96 kg/m    Physical Exam:  Vitals: BP (!) 148/70   Pulse 50   Temp 98  F (36.7  C)   Ht 1.727 m (5' 8\")   Wt 68.5 kg (151 lb)   BMI 22.96 kg/m      Constitutional:           Skin:           Head:           Eyes:           ENT:           Neck:           Chest:           Cardiac:                    Abdomen:           Vascular:                                        Extremities and Back:           Neurological:           Labs:  LIPID RESULTS:  Lab Results   Component Value Date    CHOL 119 06/01/2020    HDL 48 06/01/2020    LDL 54 06/01/2020    TRIG 87 06/01/2020    CHOLHDLRATIO 3.0 07/07/2015       LIVER ENZYME RESULTS:  Lab Results   Component Value Date    AST 20 07/14/2020    ALT 21 07/14/2020       CBC RESULTS:  Lab Results   Component Value Date    WBC 3.9 (L) 08/13/2020    RBC 4.70 08/13/2020    HGB 10.3 (L) 08/13/2020    HCT 32.4 (L) 08/13/2020    MCV 69 (L) 08/13/2020    MCH 21.9 (L) 08/13/2020    MCHC 31.8 08/13/2020    RDW 17.3 (H) 08/13/2020     (L) 08/13/2020       BMP RESULTS:  Lab Results   Component Value Date     08/13/2020    POTASSIUM 4.9 08/13/2020    CHLORIDE 106 08/13/2020    CO2 24 08/13/2020    ANIONGAP 12.9 08/13/2020     08/13/2020    BUN 56 (H) 08/13/2020    CR 2.08 (H) 08/13/2020    GFRESTIMATED 31 (L) 08/13/2020    GFRESTBLACK 37 (L) 08/13/2020    AYALA 9.7 08/13/2020        A1C RESULTS:  No results found for: A1C    INR RESULTS:  Lab Results   Component Value Date    INR 1.21 (H) 07/13/2020    INR 1.25 (H) 06/03/2020         [unfilled]    "

## 2020-08-28 ENCOUNTER — HOSPITAL ENCOUNTER (OUTPATIENT)
Dept: CARDIAC REHAB | Facility: CLINIC | Age: 85
End: 2020-08-28
Attending: INTERNAL MEDICINE
Payer: COMMERCIAL

## 2020-08-28 PROCEDURE — 40000116 ZZH STATISTIC OP CR VISIT: Performed by: OCCUPATIONAL THERAPIST

## 2020-08-28 PROCEDURE — 93798 PHYS/QHP OP CAR RHAB W/ECG: CPT | Performed by: OCCUPATIONAL THERAPIST

## 2020-08-28 PROCEDURE — T1013 SIGN LANG/ORAL INTERPRETER: HCPCS | Mod: U3

## 2020-08-31 ENCOUNTER — HOSPITAL ENCOUNTER (OUTPATIENT)
Dept: CARDIAC REHAB | Facility: CLINIC | Age: 85
End: 2020-08-31
Attending: INTERNAL MEDICINE
Payer: COMMERCIAL

## 2020-08-31 PROCEDURE — 93798 PHYS/QHP OP CAR RHAB W/ECG: CPT

## 2020-08-31 PROCEDURE — 40000116 ZZH STATISTIC OP CR VISIT

## 2020-08-31 PROCEDURE — T1013 SIGN LANG/ORAL INTERPRETER: HCPCS | Mod: U3

## 2020-09-01 ENCOUNTER — DOCUMENTATION ONLY (OUTPATIENT)
Dept: CARDIOLOGY | Facility: CLINIC | Age: 85
End: 2020-09-01

## 2020-09-01 NOTE — PROGRESS NOTES
Wellness Screening Tool    Symptom Screening:    Do you have one of the following NEW symptoms:      Fever (subjective or >100.0)?  No    New cough? No    Shortness of breath? No    Chills? No    New loss of taste or smell? No    Generalized body aches? No    New persistent headache? No    New sore throat? No    Nausea, vomiting or diarrhea? No    Within the past 2 weeks, have you been exposed to someone with a known positive illness below?      COVID - 19 (known or suspected) No    Chicken pox?  No    Measles? No    Pertussis? No    Have you had a positive COVID-19 diagnostic test (nasal swab test) in the last 14 days or are you currently   on self-quarantine restrictions (i.e.travel restriction, exposure, etc?) No        Patient notified of visitor restriction: Yes  Patient informed to wear a mask: Yes    Patient's appointment status: Patient will be seen in clinic as scheduled on 9/2 @ 9:45am. Kim NOLAN

## 2020-09-02 ENCOUNTER — HOSPITAL ENCOUNTER (OUTPATIENT)
Dept: CARDIAC REHAB | Facility: CLINIC | Age: 85
End: 2020-09-02
Attending: INTERNAL MEDICINE
Payer: COMMERCIAL

## 2020-09-02 ENCOUNTER — ANCILLARY PROCEDURE (OUTPATIENT)
Dept: CARDIOLOGY | Facility: CLINIC | Age: 85
End: 2020-09-02
Attending: INTERNAL MEDICINE
Payer: COMMERCIAL

## 2020-09-02 ENCOUNTER — HOSPITAL ENCOUNTER (OUTPATIENT)
Dept: CT IMAGING | Facility: CLINIC | Age: 85
Discharge: HOME | End: 2020-09-02
Attending: INTERNAL MEDICINE | Admitting: INTERNAL MEDICINE
Payer: COMMERCIAL

## 2020-09-02 DIAGNOSIS — I49.5 SSS (SICK SINUS SYNDROME) (H): ICD-10-CM

## 2020-09-02 DIAGNOSIS — R91.8 LUNG NODULES: ICD-10-CM

## 2020-09-02 DIAGNOSIS — Z95.0 CARDIAC PACEMAKER IN SITU: ICD-10-CM

## 2020-09-02 DIAGNOSIS — Z95.0 CARDIAC PACEMAKER IN SITU: Primary | ICD-10-CM

## 2020-09-02 PROCEDURE — T1013 SIGN LANG/ORAL INTERPRETER: HCPCS | Mod: U3

## 2020-09-02 PROCEDURE — 74176 CT ABD & PELVIS W/O CONTRAST: CPT

## 2020-09-02 PROCEDURE — 93279 PRGRMG DEV EVAL PM/LDLS PM: CPT | Performed by: INTERNAL MEDICINE

## 2020-09-02 PROCEDURE — 93798 PHYS/QHP OP CAR RHAB W/ECG: CPT

## 2020-09-02 PROCEDURE — 40000116 ZZH STATISTIC OP CR VISIT

## 2020-09-02 PROCEDURE — T1013 SIGN LANG/ORAL INTERPRETER: HCPCS | Mod: U3 | Performed by: INTERNAL MEDICINE

## 2020-09-04 ENCOUNTER — HOSPITAL ENCOUNTER (OUTPATIENT)
Dept: CARDIAC REHAB | Facility: CLINIC | Age: 85
End: 2020-09-04
Attending: INTERNAL MEDICINE
Payer: COMMERCIAL

## 2020-09-04 ENCOUNTER — TELEPHONE (OUTPATIENT)
Dept: CARDIOLOGY | Facility: CLINIC | Age: 85
End: 2020-09-04

## 2020-09-04 PROCEDURE — T1013 SIGN LANG/ORAL INTERPRETER: HCPCS | Mod: U3

## 2020-09-04 PROCEDURE — 40000116 ZZH STATISTIC OP CR VISIT: Performed by: REHABILITATION PRACTITIONER

## 2020-09-04 PROCEDURE — 93798 PHYS/QHP OP CAR RHAB W/ECG: CPT | Performed by: REHABILITATION PRACTITIONER

## 2020-09-04 NOTE — TELEPHONE ENCOUNTER
Patient seen in device clinic for 6 week post pacemaker placement. At OV patient asked if he could return to work. Patient asked that we call son, Willi, with updates. Called Willi to inquire about what activity Shriaz would be doing at work. Will attempt to call again.

## 2020-09-07 LAB
MDC_IDC_LEAD_IMPLANT_DT: NORMAL
MDC_IDC_LEAD_LOCATION: NORMAL
MDC_IDC_LEAD_LOCATION_DETAIL_1: NORMAL
MDC_IDC_LEAD_MFG: NORMAL
MDC_IDC_LEAD_MODEL: NORMAL
MDC_IDC_LEAD_POLARITY_TYPE: NORMAL
MDC_IDC_LEAD_SERIAL: NORMAL
MDC_IDC_MSMT_BATTERY_DTM: NORMAL
MDC_IDC_MSMT_BATTERY_REMAINING_LONGEVITY: 186 MO
MDC_IDC_MSMT_BATTERY_RRT_TRIGGER: 2.62
MDC_IDC_MSMT_BATTERY_STATUS: NORMAL
MDC_IDC_MSMT_BATTERY_VOLTAGE: 3.22 V
MDC_IDC_MSMT_LEADCHNL_RV_IMPEDANCE_VALUE: 342 OHM
MDC_IDC_MSMT_LEADCHNL_RV_IMPEDANCE_VALUE: 608 OHM
MDC_IDC_MSMT_LEADCHNL_RV_PACING_THRESHOLD_AMPLITUDE: 0.88 V
MDC_IDC_MSMT_LEADCHNL_RV_PACING_THRESHOLD_PULSEWIDTH: 0.4 MS
MDC_IDC_MSMT_LEADCHNL_RV_SENSING_INTR_AMPL: 7.5 MV
MDC_IDC_MSMT_LEADCHNL_RV_SENSING_INTR_AMPL: 9.12 MV
MDC_IDC_PG_IMPLANT_DTM: NORMAL
MDC_IDC_PG_MFG: NORMAL
MDC_IDC_PG_MODEL: NORMAL
MDC_IDC_PG_SERIAL: NORMAL
MDC_IDC_PG_TYPE: NORMAL
MDC_IDC_SESS_CLINIC_NAME: NORMAL
MDC_IDC_SESS_DTM: NORMAL
MDC_IDC_SESS_TYPE: NORMAL
MDC_IDC_SET_BRADY_HYSTRATE: NORMAL
MDC_IDC_SET_BRADY_LOWRATE: 50 {BEATS}/MIN
MDC_IDC_SET_BRADY_MODE: NORMAL
MDC_IDC_SET_LEADCHNL_RV_PACING_AMPLITUDE: 2 V
MDC_IDC_SET_LEADCHNL_RV_PACING_ANODE_ELECTRODE_1: NORMAL
MDC_IDC_SET_LEADCHNL_RV_PACING_ANODE_LOCATION_1: NORMAL
MDC_IDC_SET_LEADCHNL_RV_PACING_CAPTURE_MODE: NORMAL
MDC_IDC_SET_LEADCHNL_RV_PACING_CATHODE_ELECTRODE_1: NORMAL
MDC_IDC_SET_LEADCHNL_RV_PACING_CATHODE_LOCATION_1: NORMAL
MDC_IDC_SET_LEADCHNL_RV_PACING_POLARITY: NORMAL
MDC_IDC_SET_LEADCHNL_RV_PACING_PULSEWIDTH: 0.4 MS
MDC_IDC_SET_LEADCHNL_RV_SENSING_ANODE_ELECTRODE_1: NORMAL
MDC_IDC_SET_LEADCHNL_RV_SENSING_ANODE_LOCATION_1: NORMAL
MDC_IDC_SET_LEADCHNL_RV_SENSING_CATHODE_ELECTRODE_1: NORMAL
MDC_IDC_SET_LEADCHNL_RV_SENSING_CATHODE_LOCATION_1: NORMAL
MDC_IDC_SET_LEADCHNL_RV_SENSING_POLARITY: NORMAL
MDC_IDC_SET_LEADCHNL_RV_SENSING_SENSITIVITY: 0.9 MV
MDC_IDC_SET_ZONE_DETECTION_INTERVAL: 360 MS
MDC_IDC_SET_ZONE_TYPE: NORMAL
MDC_IDC_STAT_BRADY_DTM_END: NORMAL
MDC_IDC_STAT_BRADY_DTM_START: NORMAL
MDC_IDC_STAT_BRADY_RV_PERCENT_PACED: 37.82 %
MDC_IDC_STAT_EPISODE_RECENT_COUNT: 0
MDC_IDC_STAT_EPISODE_RECENT_COUNT: 0
MDC_IDC_STAT_EPISODE_RECENT_COUNT_DTM_END: NORMAL
MDC_IDC_STAT_EPISODE_RECENT_COUNT_DTM_END: NORMAL
MDC_IDC_STAT_EPISODE_RECENT_COUNT_DTM_START: NORMAL
MDC_IDC_STAT_EPISODE_RECENT_COUNT_DTM_START: NORMAL
MDC_IDC_STAT_EPISODE_TOTAL_COUNT: 0
MDC_IDC_STAT_EPISODE_TOTAL_COUNT: 0
MDC_IDC_STAT_EPISODE_TOTAL_COUNT_DTM_END: NORMAL
MDC_IDC_STAT_EPISODE_TOTAL_COUNT_DTM_END: NORMAL
MDC_IDC_STAT_EPISODE_TOTAL_COUNT_DTM_START: NORMAL
MDC_IDC_STAT_EPISODE_TOTAL_COUNT_DTM_START: NORMAL
MDC_IDC_STAT_EPISODE_TYPE: NORMAL

## 2020-09-09 ENCOUNTER — ONCOLOGY VISIT (OUTPATIENT)
Dept: ONCOLOGY | Facility: CLINIC | Age: 85
End: 2020-09-09
Attending: INTERNAL MEDICINE
Payer: COMMERCIAL

## 2020-09-09 VITALS
HEART RATE: 58 BPM | RESPIRATION RATE: 16 BRPM | DIASTOLIC BLOOD PRESSURE: 67 MMHG | OXYGEN SATURATION: 99 % | SYSTOLIC BLOOD PRESSURE: 157 MMHG | TEMPERATURE: 98.5 F | WEIGHT: 159 LBS | HEIGHT: 68 IN | BODY MASS INDEX: 24.1 KG/M2

## 2020-09-09 DIAGNOSIS — D61.818 PANCYTOPENIA (H): Primary | ICD-10-CM

## 2020-09-09 DIAGNOSIS — R42 DIZZINESS: ICD-10-CM

## 2020-09-09 DIAGNOSIS — R91.8 LUNG NODULES: ICD-10-CM

## 2020-09-09 PROCEDURE — 99214 OFFICE O/P EST MOD 30 MIN: CPT | Performed by: INTERNAL MEDICINE

## 2020-09-09 PROCEDURE — G0463 HOSPITAL OUTPT CLINIC VISIT: HCPCS

## 2020-09-09 RX ORDER — MECLIZINE HCL 12.5 MG 12.5 MG/1
12.5 TABLET ORAL 3 TIMES DAILY PRN
Qty: 90 TABLET | Refills: 1 | Status: ON HOLD | OUTPATIENT
Start: 2020-09-09 | End: 2022-02-25

## 2020-09-09 RX ORDER — MECLIZINE HCL 12.5 MG 12.5 MG/1
TABLET ORAL
COMMUNITY
Start: 2020-05-22 | End: 2020-09-09

## 2020-09-09 ASSESSMENT — PAIN SCALES - GENERAL: PAINLEVEL: NO PAIN (0)

## 2020-09-09 ASSESSMENT — MIFFLIN-ST. JEOR: SCORE: 1380.72

## 2020-09-09 NOTE — LETTER
9/9/2020         RE: Shiraz Ingram  [address]  [facility] [address]        Dear Colleague,    Thank you for referring your patient, Shiraz Ingram, to the [facility]. Please see a copy of my visit note below.    Visit Date:   09/09/2020     ONCOLOGY HISTORY: Mr. Ingram is a gentleman with lung nodules and adrenal masses.   1.  CT chest non-contrast on 10/23/2008 revealed scattered noncalcified pulmonary nodules measuring 5 mm or less and multiple homogeneously hyperdense renal lesion. 2.  CT angiogram for TAVR on 02/04/2020 revealed nonspecific mediastinal and right hilar lymphadenopathy, scattered bilateral lung nodules measuring up to 6 mm and  multiple renal masses concerning for malignancy.    3. CT chest, abdomen and pelvis on 02/25/2020 reveals bilateral lung nodules which have been present since 2004 have minimally increased in size.  These are considered benign, as over more than 15 years the size has just minimally increased.  There are multiple renal simple and complex hemorrhagic cysts, not suspicious for malignancy.       SUBJECTIVE:  Mr. Ingram is an 85-year-old gentleman with lung nodules and renal cyst.  He is here for followup.      Overall, he is doing well.  He gets intermittent dizziness.  Meclizine helps.  He wants a refill on it.  No headache.  No visual problem.  No chest pain.  No shortness of breath.  No cough.  No abdominal pain.  No nausea or vomiting.  No urinary or bowel complaints.  No abnormal bleeding.  No fever, chills or night sweats.  Intermittently, he gets some heartburn.      All other review of systems negative.      CT chest, abdomen and pelvis was done on 09/02/2020.  Tiny lung nodules are all stable.  There is minimal right and left pleural effusion.  Renal cyst is stable.  No suspicion of malignancy on CT scan.      PHYSICAL EXAMINATION:   GENERAL:  He is alert, oriented x 3.   VITAL SIGNS:  Reviewed. ECOG PS of 1.   The rest of the systems not  examined.      LABORATORY DATA:  Reviewed.      ASSESSMENT:   1.  An 85-year-old gentleman with bilateral lung nodules, which are stable.   2.  Renal cyst, stable.   3.  Pancytopenia, stable.   4.  Chronic kidney disease.   5.  Intermittent dizziness.   6.  Gastroesophageal reflux disease (GERD).      PLAN:     1.  CT scan was reviewed with the patient.  I explained to him there is no evidence of any malignancy.  There are tiny lung nodules, which are stable.  He was happy to know that.  There is no big lymphadenopathy.  Renal cysts are all stable.      We will plan on monitoring these abnormalities with CT scan in 1 year. CT scan will be scheduled during next appointment.  He is agreeable for it.      2.  Labs were all reviewed.  I explained to him that he is mildly pancytopenic.  We will monitor it.  If it gets worse, we will plan on getting a bone marrow biopsy.  Given his age, he might be developing myelodysplastic syndrome.      His creatinine has also increased.  That will be also monitored.  I also advised him to talk to his primary care physician regarding his elevated creatinine.      3.  We will get labs including CBC, iron studies and BMP in 3 months.  I will see him back after that.  I advised the patient to see a physician sooner if he has fever, chills, infection, easy bruising, bleeding, worsening weakness or any other concerns.  I told him to go to ER if he has chest-related symptoms including pain, shortness of breath or cough.  I advised the patient to use over-the-counter medications like Pepcid or Prilosec for GERD.      TOTAL FACE TO FACE TIME SPENT:  25 minutes, more than 50% of the time spent in counseling and coordination of care.         NABIL MENDEZ MD             D: 2020   T: 2020   MT: SHAHID      Name:     SRIKANTH OBANDO   MRN:      [id number]        Account:      [id number]   :      1934           Visit Date:   2020      Document: [id number]      Visit Date:    09/09/2020     ONCOLOGY HISTORY: Mr. Ingram is a gentleman with lung nodules and adrenal masses.   1.  CT chest non-contrast on 10/23/2008 revealed scattered noncalcified pulmonary nodules measuring 5 mm or less and multiple homogeneously hyperdense renal lesion. 2.  CT angiogram for TAVR on 02/04/2020 revealed nonspecific mediastinal and right hilar lymphadenopathy, scattered bilateral lung nodules measuring up to 6 mm and  multiple renal masses concerning for malignancy.    3. CT chest, abdomen and pelvis on 02/25/2020 reveals bilateral lung nodules which have been present since 2004 have minimally increased in size.  These are considered benign, as over more than 15 years the size has just minimally increased.  There are multiple renal simple and complex hemorrhagic cysts, not suspicious for malignancy.       SUBJECTIVE:  Mr. Ingram is an 85-year-old gentleman with lung nodules and renal cyst.  He is here for followup.      Overall, he is doing well.  He gets intermittent dizziness.  Meclizine helps.  He wants a refill on it.  No headache.  No visual problem.  No chest pain.  No shortness of breath.  No cough.  No abdominal pain.  No nausea or vomiting.  No urinary or bowel complaints.  No abnormal bleeding.  No fever, chills or night sweats.  Intermittently, he gets some heartburn.      All other review of systems negative.      CT chest, abdomen and pelvis was done on 09/02/2020.  Tiny lung nodules are all stable.  There is minimal right and left pleural effusion.  Renal cyst is stable.  No suspicion of malignancy on CT scan.      PHYSICAL EXAMINATION:   GENERAL:  He is alert, oriented x 3.   VITAL SIGNS:  Reviewed. ECOG PS of 1.   The rest of the systems not examined.      LABORATORY DATA:  Reviewed.      ASSESSMENT:   1.  An 85-year-old gentleman with bilateral lung nodules, which are stable.   2.  Renal cyst, stable.   3.  Pancytopenia, stable.   4.  Chronic kidney disease.   5.  Intermittent dizziness.   6.   Gastroesophageal reflux disease (GERD).      PLAN:     1.  CT scan was reviewed with the patient.  I explained to him there is no evidence of any malignancy.  There are tiny lung nodules, which are stable.  He was happy to know that.  There is no big lymphadenopathy.  Renal cysts are all stable.      We will plan on monitoring these abnormalities with CT scan in 1 year. CT scan will be scheduled during next appointment.  He is agreeable for it.      2.  Labs were all reviewed.  I explained to him that he is mildly pancytopenic.  We will monitor it.  If it gets worse, we will plan on getting a bone marrow biopsy.  Given his age, he might be developing myelodysplastic syndrome.      His creatinine has also increased.  That will be also monitored.  I also advised him to talk to his primary care physician regarding his elevated creatinine.      3.  We will get labs including CBC, iron studies and BMP in 3 months.  I will see him back after that.  I advised the patient to see a physician sooner if he has fever, chills, infection, easy bruising, bleeding, worsening weakness or any other concerns.  I told him to go to ER if he has chest-related symptoms including pain, shortness of breath or cough.  I advised the patient to use over-the-counter medications like Pepcid or Prilosec for GERD.      TOTAL FACE TO FACE TIME SPENT:  25 minutes, more than 50% of the time spent in counseling and coordination of care.         NABIL MENDEZ MD             D: 2020   T: 2020   MT:       Name:     SRIKANTH OBANDO   MRN:      [id number]        Account:      [id number]   :      1934           Visit Date:   2020      Document: [id number]          Again, thank you for allowing me to participate in the care of your patient.        Sincerely,        Nabil Mendez MD

## 2020-09-09 NOTE — PROGRESS NOTES
Visit Date:   09/09/2020     ONCOLOGY HISTORY: Mr. Ingram is a gentleman with lung nodules and adrenal masses.   1.  CT chest non-contrast on 10/23/2008 revealed scattered noncalcified pulmonary nodules measuring 5 mm or less and multiple homogeneously hyperdense renal lesion. 2.  CT angiogram for TAVR on 02/04/2020 revealed nonspecific mediastinal and right hilar lymphadenopathy, scattered bilateral lung nodules measuring up to 6 mm and  multiple renal masses concerning for malignancy.    3. CT chest, abdomen and pelvis on 02/25/2020 reveals bilateral lung nodules which have been present since 2004 have minimally increased in size.  These are considered benign, as over more than 15 years the size has just minimally increased.  There are multiple renal simple and complex hemorrhagic cysts, not suspicious for malignancy.       SUBJECTIVE:  Mr. Ingram is an 85-year-old gentleman with lung nodules and renal cyst.  He is here for followup.      Overall, he is doing well.  He gets intermittent dizziness.  Meclizine helps.  He wants a refill on it.  No headache.  No visual problem.  No chest pain.  No shortness of breath.  No cough.  No abdominal pain.  No nausea or vomiting.  No urinary or bowel complaints.  No abnormal bleeding.  No fever, chills or night sweats.  Intermittently, he gets some heartburn.      All other review of systems negative.      CT chest, abdomen and pelvis was done on 09/02/2020.  Tiny lung nodules are all stable.  There is minimal right and left pleural effusion.  Renal cyst is stable.  No suspicion of malignancy on CT scan.      PHYSICAL EXAMINATION:   GENERAL:  He is alert, oriented x 3.   VITAL SIGNS:  Reviewed. ECOG PS of 1.   The rest of the systems not examined.      LABORATORY DATA:  Reviewed.      ASSESSMENT:   1.  An 85-year-old gentleman with bilateral lung nodules, which are stable.   2.  Renal cyst, stable.   3.  Pancytopenia, stable.   4.  Chronic kidney disease.   5.  Intermittent  dizziness.   6.  Gastroesophageal reflux disease (GERD).      PLAN:     1.  CT scan was reviewed with the patient.  I explained to him there is no evidence of any malignancy.  There are tiny lung nodules, which are stable.  He was happy to know that.  There is no big lymphadenopathy.  Renal cysts are all stable.      We will plan on monitoring these abnormalities with CT scan in 1 year. CT scan will be scheduled during next appointment.  He is agreeable for it.      2.  Labs were all reviewed.  I explained to him that he is mildly pancytopenic.  We will monitor it.  If it gets worse, we will plan on getting a bone marrow biopsy.  Given his age, he might be developing myelodysplastic syndrome.      His creatinine has also increased.  That will be also monitored.  I also advised him to talk to his primary care physician regarding his elevated creatinine.      3.  We will get labs including CBC, iron studies and BMP in 3 months.  I will see him back after that.  I advised the patient to see a physician sooner if he has fever, chills, infection, easy bruising, bleeding, worsening weakness or any other concerns.  I told him to go to ER if he has chest-related symptoms including pain, shortness of breath or cough.  I advised the patient to use over-the-counter medications like Pepcid or Prilosec for GERD.      TOTAL FACE TO FACE TIME SPENT:  25 minutes, more than 50% of the time spent in counseling and coordination of care.         NABIL MENDEZ MD             D: 2020   T: 2020   MT: SHAHID      Name:     SRIKANTH OBANDO   MRN:      [id number]        Account:      [id number]   :      1934           Visit Date:   2020      Document: [id number]

## 2020-09-09 NOTE — LETTER
[facility]  [address]  [place] [place] [facility] [address]            2020    RE:  Shiraz Ingram                                                                                                                                                       [address]  [facility] [address]          To whom it may concern:    I am writing on behalf of Shiraz Ingram, : 34, who is under my care.    I approve of Shiraz returning to work.     If you have any questions or concerns please contact my office at [phone].      Sincerely,        Joon Morris MD

## 2020-09-10 NOTE — TELEPHONE ENCOUNTER
Called patients sonWilli, regarding patient returning to work. Patient works with [employer] 2-3 days a week. Patient is not around any magnets or machinery that would interfere with device. Patient is able to resume normal activity that doesn't require repetitive movement of the left arm 6 weeks post PPM placement.  Son, Willi, stated he will have patient ease back into working longer hours and call if there are any concerns.

## 2020-09-11 ENCOUNTER — HOSPITAL ENCOUNTER (OUTPATIENT)
Dept: CARDIAC REHAB | Facility: CLINIC | Age: 85
End: 2020-09-11
Attending: INTERNAL MEDICINE
Payer: COMMERCIAL

## 2020-09-11 PROCEDURE — 40000116 ZZH STATISTIC OP CR VISIT: Performed by: CLINICAL EXERCISE PHYSIOLOGIST

## 2020-09-11 PROCEDURE — 93798 PHYS/QHP OP CAR RHAB W/ECG: CPT | Performed by: CLINICAL EXERCISE PHYSIOLOGIST

## 2020-09-13 NOTE — PROGRESS NOTES
Visit Date:   09/09/2020     ONCOLOGY HISTORY: Mr. Ingram is a gentleman with lung nodules and adrenal masses.   1.  CT chest non-contrast on 10/23/2008 revealed scattered noncalcified pulmonary nodules measuring 5 mm or less and multiple homogeneously hyperdense renal lesion. 2.  CT angiogram for TAVR on 02/04/2020 revealed nonspecific mediastinal and right hilar lymphadenopathy, scattered bilateral lung nodules measuring up to 6 mm and  multiple renal masses concerning for malignancy.    3. CT chest, abdomen and pelvis on 02/25/2020 reveals bilateral lung nodules which have been present since 2004 have minimally increased in size.  These are considered benign, as over more than 15 years the size has just minimally increased.  There are multiple renal simple and complex hemorrhagic cysts, not suspicious for malignancy.       SUBJECTIVE:  Mr. Ingram is an 85-year-old gentleman with lung nodules and renal cyst.  He is here for followup.      Overall, he is doing well.  He gets intermittent dizziness.  Meclizine helps.  He wants a refill on it.  No headache.  No visual problem.  No chest pain.  No shortness of breath.  No cough.  No abdominal pain.  No nausea or vomiting.  No urinary or bowel complaints.  No abnormal bleeding.  No fever, chills or night sweats.  Intermittently, he gets some heartburn.      All other review of systems negative.      CT chest, abdomen and pelvis was done on 09/02/2020.  Tiny lung nodules are all stable.  There is minimal right and left pleural effusion.  Renal cyst is stable.  No suspicion of malignancy on CT scan.      PHYSICAL EXAMINATION:   GENERAL:  He is alert, oriented x 3.   VITAL SIGNS:  Reviewed. ECOG PS of 1.   The rest of the systems not examined.      LABORATORY DATA:  Reviewed.      ASSESSMENT:   1.  An 85-year-old gentleman with bilateral lung nodules, which are stable.   2.  Renal cyst, stable.   3.  Pancytopenia, stable.   4.  Chronic kidney disease.   5.  Intermittent  dizziness.   6.  Gastroesophageal reflux disease (GERD).      PLAN:     1.  CT scan was reviewed with the patient.  I explained to him there is no evidence of any malignancy.  There are tiny lung nodules, which are stable.  He was happy to know that.  There is no big lymphadenopathy.  Renal cysts are all stable.      We will plan on monitoring these abnormalities with CT scan in 1 year. CT scan will be scheduled during next appointment.  He is agreeable for it.      2.  Labs were all reviewed.  I explained to him that he is mildly pancytopenic.  We will monitor it.  If it gets worse, we will plan on getting a bone marrow biopsy.  Given his age, he might be developing myelodysplastic syndrome.      His creatinine has also increased.  That will be also monitored.  I also advised him to talk to his primary care physician regarding his elevated creatinine.      3.  We will get labs including CBC, iron studies and BMP in 3 months.  I will see him back after that.  I advised the patient to see a physician sooner if he has fever, chills, infection, easy bruising, bleeding, worsening weakness or any other concerns.  I told him to go to ER if he has chest-related symptoms including pain, shortness of breath or cough.  I advised the patient to use over-the-counter medications like Pepcid or Prilosec for GERD.      TOTAL FACE TO FACE TIME SPENT:  25 minutes, more than 50% of the time spent in counseling and coordination of care.         NABIL MENDEZ MD             D: 2020   T: 2020   MT: SHAHID      Name:     SRIKANTH OBANDO   MRN:      [id number]        Account:      [id number]   :      1934           Visit Date:   2020      Document: [id number]

## 2020-09-14 ENCOUNTER — HOSPITAL ENCOUNTER (OUTPATIENT)
Dept: CARDIAC REHAB | Facility: CLINIC | Age: 85
End: 2020-09-14
Attending: INTERNAL MEDICINE
Payer: COMMERCIAL

## 2020-09-14 PROCEDURE — 40000116 ZZH STATISTIC OP CR VISIT: Performed by: CLINICAL EXERCISE PHYSIOLOGIST

## 2020-09-14 PROCEDURE — T1013 SIGN LANG/ORAL INTERPRETER: HCPCS | Mod: U3

## 2020-09-14 PROCEDURE — 40000575 ZZH STATISTIC OP CARDIAC VISIT #2: Performed by: CLINICAL EXERCISE PHYSIOLOGIST

## 2020-09-14 PROCEDURE — 93797 PHYS/QHP OP CAR RHAB WO ECG: CPT | Mod: 59 | Performed by: CLINICAL EXERCISE PHYSIOLOGIST

## 2020-09-14 PROCEDURE — 93798 PHYS/QHP OP CAR RHAB W/ECG: CPT | Performed by: CLINICAL EXERCISE PHYSIOLOGIST

## 2020-09-17 ENCOUNTER — HOSPITAL ENCOUNTER (EMERGENCY)
Facility: CLINIC | Age: 85
Discharge: HOME | End: 2020-09-17
Attending: EMERGENCY MEDICINE | Admitting: EMERGENCY MEDICINE
Payer: COMMERCIAL

## 2020-09-17 VITALS
WEIGHT: 150 LBS | DIASTOLIC BLOOD PRESSURE: 58 MMHG | HEART RATE: 59 BPM | TEMPERATURE: 97.2 F | SYSTOLIC BLOOD PRESSURE: 157 MMHG | RESPIRATION RATE: 16 BRPM | HEIGHT: 68 IN | OXYGEN SATURATION: 97 % | BODY MASS INDEX: 22.73 KG/M2

## 2020-09-17 DIAGNOSIS — M54.50 ACUTE MIDLINE LOW BACK PAIN WITHOUT SCIATICA: ICD-10-CM

## 2020-09-17 PROCEDURE — 99283 EMERGENCY DEPT VISIT LOW MDM: CPT

## 2020-09-17 PROCEDURE — 25000132 ZZH RX MED GY IP 250 OP 250 PS 637: Performed by: EMERGENCY MEDICINE

## 2020-09-17 RX ORDER — ACETAMINOPHEN 325 MG/1
650 TABLET ORAL ONCE
Status: COMPLETED | OUTPATIENT
Start: 2020-09-17 | End: 2020-09-17

## 2020-09-17 RX ORDER — DIAZEPAM 5 MG
5 TABLET ORAL EVERY 6 HOURS PRN
Qty: 20 TABLET | Refills: 0 | Status: SHIPPED | OUTPATIENT
Start: 2020-09-17 | End: 2020-09-24

## 2020-09-17 RX ORDER — METHYLPREDNISOLONE 4 MG
TABLET, DOSE PACK ORAL
Qty: 21 TABLET | Refills: 0 | Status: SHIPPED | OUTPATIENT
Start: 2020-09-17 | End: 2020-09-28

## 2020-09-17 RX ADMIN — ACETAMINOPHEN 650 MG: 325 TABLET, FILM COATED ORAL at 20:22

## 2020-09-17 ASSESSMENT — ENCOUNTER SYMPTOMS
NUMBNESS: 0
FEVER: 0
DIARRHEA: 0
WEAKNESS: 0
BACK PAIN: 1
BLOOD IN STOOL: 0

## 2020-09-17 ASSESSMENT — MIFFLIN-ST. JEOR: SCORE: 1339.9

## 2020-09-17 NOTE — ED AVS SNAPSHOT
Emergency Department  [address] [place] [address]  Phone:  [phone]  Fax:  [phone]                                    Shiraz Ingram   MRN: [id number]    Department:   Emergency Department   Date of Visit:  9/17/2020           After Visit Summary Signature Page    I have received my discharge instructions, and my questions have been answered. I have discussed any challenges I see with this plan with the nurse or doctor.    ..........................................................................................................................................  Patient/Patient Representative Signature      ..........................................................................................................................................  Patient Representative Print Name and Relationship to Patient    ..................................................               ................................................  Date                                   Time    ..........................................................................................................................................  Reviewed by Signature/Title    ...................................................              ..............................................  Date                                               Time          22EPIC Rev 08/18

## 2020-09-17 NOTE — ED TRIAGE NOTES
Pt stated since last week his back has been hurting  - no apparent injury - no Bm for 3 days ( goes daily ) , no problem urinating. Pt lifts 7pound weights for therapy weekly. Vss.

## 2020-09-18 ENCOUNTER — PATIENT OUTREACH (OUTPATIENT)
Dept: CARE COORDINATION | Facility: CLINIC | Age: 85
End: 2020-09-18

## 2020-09-18 NOTE — LETTER
[facility] CARE COORDINATION  [address] [address]    September 21, 2020    Shiraz Ingram  [address] [facility] [address]      Dear Shiraz,    I am a clinic care coordinator who works with Dany Rodriguez MD, MD at [facility]. I have been trying to reach you recently to introduce Clinic Care Coordination and to see if there was anything I could assist you with.  Below is a description of clinic care coordination and how I can further assist you.      The clinic care coordination team is made up of a registered nurse,  and community health worker who understand the health care system. The goal of clinic care coordination is to help you manage your health and improve access to the health care system in the most efficient manner. The team can assist you in meeting your health care goals by providing education, coordinating services, strengthening the communication among your providers and supporting you with any resource needs.    Please feel free to contact the Community Health Worker , Mandy Juarez, at [phone] with any questions or concerns. We are focused on providing you with the highest-quality healthcare experience possible and that all starts with you.     Sincerely,     Georgette Mahoney RN  [facility] Primary Care-Care Coordination  [facility]-Integrated Primary [facility]  [facility]  [phone]

## 2020-09-18 NOTE — LETTER
[facility] CARE COORDINATION  ***  September 21, 2020    Shiraz Ingram  [address]  [facility] [address]      Dear Shiraz,    I have been unsuccessful in reaching you since our last contact. At this time the Care Coordination team will make no further attempts to reach you, however this does not change your ability to continue receiving care from your providers at your primary care clinic. If you need additional support from a care coordinator in the future please contact *** at ***.    All of us at *** Clinic are invested in your health and are here to assist you in meeting your goals.     Sincerely,    ***

## 2020-09-18 NOTE — ED PROVIDER NOTES
History     Chief Complaint:  Back Pain    A  was used. (.)    HPI  Shiraz Ingram is a 85 year old male with atrial fibrillation, coronary artery disease, and a history of CABG who is anticoagulated on Pradaxa who presents with atraumatic lower back pain. The patient states he has had back pain for the past 7 days that has been progressively worsening. The pain is concentrated near the middle of his back along his spine. The pain does not radiated. He states the pain worsens when sitting or trying to stand up. He has not taken any pain medications due to concern for his heart problems. He denies urinary symptoms or fevers. He notes he had not had a bowel movement for 3 days, but today has had 8 normal bowel movements. He denies diarrhea or blood in stool. He has no numbness or weakness in his extremities.     Imaging from 09/02/2020:  CT Chest Abdomen Pelvis without Contrast  1. Stable pulmonary nodules.  2. New minimal right and trace left pleural effusion.  3. No significant change in probable proteinaceous or hemorrhagic  cysts in both kidneys.    Allergies:  No Known Drug Allergies     Medications:    Albuterol inhaler  ASA  Pradaxa  Lasix  Zestril  Antivert  Lopressor  Singulair  Nitrostat  Protonix  Zocor     Past Medical History:    Anemia  Aortic stenosis  Benign neoplasm of colon  Chronic kidney disease, stage 3  Coronary arthrosclerosis  Hyperlipidemia  Hypertension  Localized osteoarthrosis lower leg  Persistent atrial fibrillation  Hearing loss  Coronary artery disease  Coronary artery bypass graft  Congestive heart failure  Hyperkalemia  Pneumonia  Complete heart block    Past Surgical History:    Coronary artery bypass  Colonoscopy  Angiogram coronary graft  Transcatheter aortic valve replacement  EP pacemaker  Esophagoscopy  Inguinal herniorrhaphy   Laparoscopic cholecystectomy     Family History:    Mother - Breast Cancer, Pneumonia  Father - Coronary Artery  "Disease   Brother - Heart Disease    Social History:  The patient was accompanied to the ED by son.  Smoking Status: Former Smoker  Smokeless tobacco: Never Used  Alcohol Use: Yes  Drug Use: No  PCP:  Dany Rodriguez  Marital Status:   [2]     Review of Systems   Constitutional: Negative for fever.   Gastrointestinal: Negative for blood in stool and diarrhea.   Genitourinary: Negative.    Musculoskeletal: Positive for back pain.   Neurological: Negative for weakness and numbness.   all other systems reviewed and negative    Physical Exam     Patient Vitals for the past 24 hrs:   BP Temp Temp src Pulse Resp SpO2 Height Weight   09/17/20 1836 (!) 157/58 97.2  F (36.2  C) Temporal 59 16 97 % 1.727 m (5' 8\") 68 kg (150 lb)       Physical Exam  Constitutional: Elderly white male sitting, deaf.  HENT: No signs of trauma.   Eyes: EOM are normal. Pupils are equal, round, and reactive to light.   Neck: Normal range of motion. No JVD present. No cervical adenopathy.  Cardiovascular: Regular rhythm.  Exam reveals no gallop and no friction rub.    No murmur heard.  Pulmonary/Chest: Bilateral breath sounds normal. No wheezes, rhonchi or rales.  Abdominal: Soft. No tenderness. No rebound or guarding. 2+ femoral pulses bilaterally.  Musculoskeletal: No edema. No tenderness. Tenderness over lumbar spine. Negative straight leg reflex.  Lymphadenopathy: No lymphadenopathy.   Neurological: Alert and oriented to person, place, and time. Normal strength. Coordination normal. Able to ambulate with a cane. Sensation intact to light touch lower extremities. 2+ knee jerk bilaterally.  Skin: Skin is warm and dry. No rash noted. No erythema.     Emergency Department Course     Interventions:  2022 tylenol 650 mg PO    Emergency Department Course:  Past medical records, nursing notes, and vitals reviewed.    (2000)   I performed an exam of the patient as documented above. History obtained from patient .     (2019)   I rechecked " the patient and discussed results and plan of care.     Findings and plan explained to the Patient. Patient discharged home with instructions regarding supportive care, medications, and reasons to return. The importance of close follow-up was reviewed. The patient was prescribed Valium and Medrol Dosepak.     I personally answered all related questions prior to discharge.     Impression & Plan     Medical Decision Makin year old male presents with low back pain that has been bothering him for about a week. It came on gradually and he notes particularly when he goes from a sitting to a laying position. While he is up and moving it does not seem to bother him. He has no radiation to his legs. He has talked about the rash. He denies any bowel or bladder symptoms. He cannot recall any injury. As part of his heart failure evaluation he did undergo CT scan of the head abdomen and pelvis on . He has a small 3 cm infrarenal aneurysm which has not changed and he had no obvious bony lesions. On my exam there is some tenderness on his lower back but there are no lower extremity issues. He can walk with a cane. I did discuss with the patient in the use of a  that this seemed to be of musculoskeletal in nature. There where no red flags to suggest cord or cauda equina syndrome. I will start him on some Valium as needed for spasms I have warned him not to drive. I have strongly encourage him to use cold compresses 20 min at a time and tylenol for pain. He has tried nothing so far. In addition, we will place him on a Medrol Dosepak. Patient should follow up with his PMD next week.      Diagnosis:    ICD-10-CM    1. Acute midline low back pain without sciatica  M54.5      Disposition:  Discharged to home.    Discharge Medications:  Discharge Medication List as of 2020  8:20 PM      START taking these medications    Details   diazepam (VALIUM) 5 MG tablet Take 1 tablet (5 mg) by mouth  every 6 hours as needed for anxiety (MUSCLE SPASM), Disp-20 tablet,R-0, Local Print      methylPREDNISolone (MEDROL DOSEPAK) 4 MG tablet therapy pack Follow Package Directions, Disp-21 tablet,R-0, Local Print             Scribe Disclosure:  I, Kylah Babb, am serving as a scribe at 8:00 PM on 9/17/2020 to document services personally performed by Eloy Chacko MD based on my observations and the provider's statements to me. 9/17/2020    EMERGENCY DEPARTMENT       Eloy Chacko MD  09/18/20 0141

## 2020-09-18 NOTE — PROGRESS NOTES
"Clinic Care Coordination Contact  Care Team Conversations    Received care coordination referral (covering for CHW). Called son, Willi, per direction in demographics (consent to communicated on file and reviewed). Patient is doing \"great\". He was recently discharged from cardiac rehab after heart surgery. He has been cleared to go back to work doing [employer] work 2-3 times per week. Patient is walking outside 3/4 mile everyday with goal to return to walking 1 mile per day. Offered care coordination services and son thinks monthly check in calls would be a good idea. He asks that I call patient directly to discuss. Patient is deaf but his telephone system was recently fixed so he can respond to calls. Called patient. Voice mail box is full and I am unable to leave a message. Will attempt again in 1-2 business days.     Georgette Mahoney RN  [facility] Primary Care-Care Coordination  [facility]-Integrated Primary [facility]  [facility]  [phone]        "

## 2020-09-21 NOTE — PROGRESS NOTES
Clinic Care Coordination Contact  [facility]/Voicemail       Clinical Data: Care Coordinator Outreach  Outreach attempted x 2.  Patient's voice mail box is full and I am unable to leave a message  Plan: Care Coordinator will send introduction letter with care coordinator contact information via mail. Care Coordinator will do no further outreaches at this time.    Georgette Mahoney, JOSE  [facility] Primary Care-Care Coordination  [facility]-Integrated Primary Care  [facility]  [phone]

## 2020-09-22 ENCOUNTER — OFFICE VISIT (OUTPATIENT)
Dept: FAMILY MEDICINE | Facility: CLINIC | Age: 85
End: 2020-09-22
Payer: COMMERCIAL

## 2020-09-22 VITALS
DIASTOLIC BLOOD PRESSURE: 60 MMHG | RESPIRATION RATE: 16 BRPM | OXYGEN SATURATION: 96 % | SYSTOLIC BLOOD PRESSURE: 122 MMHG | HEART RATE: 68 BPM | TEMPERATURE: 98.7 F | WEIGHT: 156.6 LBS | BODY MASS INDEX: 23.73 KG/M2 | HEIGHT: 68 IN

## 2020-09-22 DIAGNOSIS — M54.50 ACUTE BILATERAL LOW BACK PAIN WITHOUT SCIATICA: Primary | ICD-10-CM

## 2020-09-22 DIAGNOSIS — Z23 NEED FOR PROPHYLACTIC VACCINATION AND INOCULATION AGAINST INFLUENZA: ICD-10-CM

## 2020-09-22 PROCEDURE — G0008 ADMIN INFLUENZA VIRUS VAC: HCPCS | Performed by: FAMILY MEDICINE

## 2020-09-22 PROCEDURE — T1013 SIGN LANG/ORAL INTERPRETER: HCPCS | Mod: U3

## 2020-09-22 PROCEDURE — 99213 OFFICE O/P EST LOW 20 MIN: CPT | Mod: 25 | Performed by: FAMILY MEDICINE

## 2020-09-22 PROCEDURE — 90662 IIV NO PRSV INCREASED AG IM: CPT | Performed by: FAMILY MEDICINE

## 2020-09-22 RX ORDER — DIAZEPAM 5 MG
5 TABLET ORAL EVERY 6 HOURS PRN
Qty: 20 TABLET | Refills: 0 | Status: CANCELLED | OUTPATIENT
Start: 2020-09-22

## 2020-09-22 RX ORDER — HYDROCODONE BITARTRATE AND ACETAMINOPHEN 5; 325 MG/1; MG/1
1 TABLET ORAL 3 TIMES DAILY PRN
Qty: 10 TABLET | Refills: 0 | Status: SHIPPED | OUTPATIENT
Start: 2020-09-22 | End: 2020-09-25

## 2020-09-22 RX ORDER — METHYLPREDNISOLONE 4 MG
TABLET, DOSE PACK ORAL
Qty: 21 TABLET | Refills: 0 | Status: CANCELLED | OUTPATIENT
Start: 2020-09-22

## 2020-09-22 ASSESSMENT — MIFFLIN-ST. JEOR: SCORE: 1369.83

## 2020-09-22 NOTE — NURSING NOTE

## 2020-09-22 NOTE — PROGRESS NOTES
Subjective     Shiraz Ingram is a 85 year old male who presents to clinic today for the following health issues:    HPI     ED/UC Followup:    Facility:   ED   Date of visit: 09/17/2020  Reason for visit: Acute midline low back pain without sciatica   Current Status: shooting pain,laying down hurts, sitting is okay but getting up hurts, laying down is painful.      Feels pain pills not strong enough.   Currently on medrol pack and valium.     Hard time bending over, lying down. Trying to get up is worst.   Standing is hard.   Walking is fine. Using a cane.   Was given valium which makes him sleepy and medrol dose pack which he does not think is strong enough.   In the ER no imaging but had ct chest and abdomen.       Social History     Occupational History     Occupation: [employer]     Employer: RETIRED   Tobacco Use     Smoking status: Former Smoker     Types: Cigarettes     Smokeless tobacco: Never Used     Tobacco comment: smoked for a week in 20's   Substance and Sexual Activity     Alcohol use: Yes     Alcohol/week: 0.0 standard drinks     Drug use: No     Sexual activity: Yes     Partners: Female     Allergies   Allergen Reactions     No Known Drug Allergies      Patient Active Problem List   Diagnosis     Coronary atherosclerosis     Benign neoplasm of colon     Localized osteoarthrosis, lower leg     Allergic rhinitis     Moderate persistent asthma     Anemia     Osteoporosis     Hypertension goal BP (blood pressure) < 140/90     Hyperlipidemia with target LDL less than 100     CKD (chronic kidney disease) stage 3, GFR 30-59 ml/min (H)     Nonrheumatic aortic valve stenosis     Advanced directives, counseling/discussion     Iron deficiency anemia     Vitamin B 12 deficiency     Health Care Home     Mild persistent asthma without complication     Acute respiratory failure (H)     Acute on chronic congestive heart failure, unspecified heart failure type (H)     Severe aortic stenosis     Coronary artery  "disease involving native coronary artery of native heart without angina pectoris     S/P CABG (coronary artery bypass graft)     Hyperkalemia     GI bleed     Pneumonia     Atrial fibrillation (H)     Thrombus of left atrial appendage     Complete heart block (H)     Aortic stenosis, severe     Acute exacerbation of CHF (congestive heart failure) (H)     Reviewed medications, social history and  past medical and surgical history.    Review of system: for general, respiratory, CVS, GI and psychiatry negative except for noted above.     EXAM:  /60 (BP Location: Right arm, Patient Position: Sitting, Cuff Size: Adult Regular)   Pulse 68   Temp 98.7  F (37.1  C) (Oral)   Resp 16   Ht 1.727 m (5' 8\")   Wt 71 kg (156 lb 9.6 oz)   SpO2 96%   BMI 23.81 kg/m    Constitutional: healthy, alert and no distress   Psychiatric: mentation appears normal and affect normal/bright  Back ROM restricted. Walking with cane. Tenderness in lumbar spinal area and paraspinal area.    ASSESSMENT / PLAN:  (M54.5) Acute bilateral low back pain without sciatica  (primary encounter diagnosis)  Comment: Reviewed ER notes.  Was given Valium and Medrol Dosepak.  I recommended him to complete the Medrol Dosepak and he can back off on Valium as he is feeling really sleepy.  He is not noticing improvement in his pain with it.  He has 2 more days of Medrol Dosepak and it would be reasonable for him to have few days of Norco handy.  He understand it is not ideal and we discussed about side effects.  We also discussed being a controlled substance.  If his symptoms are not improving in the next 1 or 2 days it would be reasonable for him to see medical spine specialist.  -We discussed about red flag symptoms and when to seek emergency room care.  He understood.  Answered all his questions via .  Plan: HYDROcodone-acetaminophen (NORCO) 5-325 MG         tablet       The above note was dictated using voice recognition. " Although reviewed after completion, some word and grammatical error may remain .

## 2020-09-25 DIAGNOSIS — I10 HYPERTENSION GOAL BP (BLOOD PRESSURE) < 140/90: ICD-10-CM

## 2020-09-28 ENCOUNTER — HOSPITAL ENCOUNTER (INPATIENT)
Facility: CLINIC | Age: 85
LOS: 3 days | Discharge: HOME | DRG: 542 | End: 2020-10-01
Attending: EMERGENCY MEDICINE | Admitting: HOSPITALIST
Payer: COMMERCIAL

## 2020-09-28 ENCOUNTER — APPOINTMENT (OUTPATIENT)
Dept: CT IMAGING | Facility: CLINIC | Age: 85
DRG: 542 | End: 2020-09-28
Attending: EMERGENCY MEDICINE
Payer: COMMERCIAL

## 2020-09-28 DIAGNOSIS — J18.9 PNEUMONIA OF RIGHT LUNG DUE TO INFECTIOUS ORGANISM, UNSPECIFIED PART OF LUNG: ICD-10-CM

## 2020-09-28 DIAGNOSIS — J18.9 COMMUNITY ACQUIRED PNEUMONIA, UNSPECIFIED LATERALITY: Primary | ICD-10-CM

## 2020-09-28 DIAGNOSIS — R11.2 NAUSEA AND VOMITING, INTRACTABILITY OF VOMITING NOT SPECIFIED, UNSPECIFIED VOMITING TYPE: ICD-10-CM

## 2020-09-28 DIAGNOSIS — M43.9 COMPRESSION DEFORMITY OF VERTEBRA: ICD-10-CM

## 2020-09-28 LAB
ALBUMIN SERPL-MCNC: 3.2 G/DL (ref 3.4–5)
ALBUMIN UR-MCNC: NEGATIVE MG/DL
ALP SERPL-CCNC: 64 U/L (ref 40–150)
ALT SERPL W P-5'-P-CCNC: 19 U/L (ref 0–70)
ANION GAP SERPL CALCULATED.3IONS-SCNC: 4 MMOL/L (ref 3–14)
APPEARANCE UR: CLEAR
AST SERPL W P-5'-P-CCNC: 16 U/L (ref 0–45)
BASOPHILS # BLD AUTO: 0 10E9/L (ref 0–0.2)
BASOPHILS NFR BLD AUTO: 0.1 %
BILIRUB SERPL-MCNC: 0.6 MG/DL (ref 0.2–1.3)
BILIRUB UR QL STRIP: NEGATIVE
BUN SERPL-MCNC: 80 MG/DL (ref 7–30)
CALCIUM SERPL-MCNC: 9.7 MG/DL (ref 8.5–10.1)
CHLORIDE SERPL-SCNC: 109 MMOL/L (ref 94–109)
CO2 SERPL-SCNC: 25 MMOL/L (ref 20–32)
COLOR UR AUTO: YELLOW
CREAT SERPL-MCNC: 1.93 MG/DL (ref 0.66–1.25)
DIFFERENTIAL METHOD BLD: ABNORMAL
EOSINOPHIL # BLD AUTO: 0.1 10E9/L (ref 0–0.7)
EOSINOPHIL NFR BLD AUTO: 1.7 %
ERYTHROCYTE [DISTWIDTH] IN BLOOD BY AUTOMATED COUNT: 17.6 % (ref 10–15)
GFR SERPL CREATININE-BSD FRML MDRD: 31 ML/MIN/{1.73_M2}
GLUCOSE SERPL-MCNC: 113 MG/DL (ref 70–99)
GLUCOSE UR STRIP-MCNC: NEGATIVE MG/DL
HCT VFR BLD AUTO: 28.3 % (ref 40–53)
HGB BLD-MCNC: 9.1 G/DL (ref 13.3–17.7)
HGB UR QL STRIP: NEGATIVE
IMM GRANULOCYTES # BLD: 0 10E9/L (ref 0–0.4)
IMM GRANULOCYTES NFR BLD: 0.1 %
INTERPRETATION ECG - MUSE: NORMAL
KETONES UR STRIP-MCNC: NEGATIVE MG/DL
LACTATE BLD-SCNC: 0.6 MMOL/L (ref 0.7–2)
LEUKOCYTE ESTERASE UR QL STRIP: NEGATIVE
LYMPHOCYTES # BLD AUTO: 0.8 10E9/L (ref 0.8–5.3)
LYMPHOCYTES NFR BLD AUTO: 11.1 %
MCH RBC QN AUTO: 21.3 PG (ref 26.5–33)
MCHC RBC AUTO-ENTMCNC: 32.2 G/DL (ref 31.5–36.5)
MCV RBC AUTO: 66 FL (ref 78–100)
MONOCYTES # BLD AUTO: 0.6 10E9/L (ref 0–1.3)
MONOCYTES NFR BLD AUTO: 8.2 %
NEUTROPHILS # BLD AUTO: 5.4 10E9/L (ref 1.6–8.3)
NEUTROPHILS NFR BLD AUTO: 78.8 %
NITRATE UR QL: NEGATIVE
NRBC # BLD AUTO: 0 10*3/UL
NRBC BLD AUTO-RTO: 0 /100
PH UR STRIP: 5 PH (ref 5–7)
PLATELET # BLD AUTO: 144 10E9/L (ref 150–450)
POTASSIUM SERPL-SCNC: 5.4 MMOL/L (ref 3.4–5.3)
PROT SERPL-MCNC: 7 G/DL (ref 6.8–8.8)
RBC # BLD AUTO: 4.27 10E12/L (ref 4.4–5.9)
RBC #/AREA URNS AUTO: 0 /HPF (ref 0–2)
SODIUM SERPL-SCNC: 138 MMOL/L (ref 133–144)
SOURCE: NORMAL
SP GR UR STRIP: 1.01 (ref 1–1.03)
UROBILINOGEN UR STRIP-MCNC: NORMAL MG/DL (ref 0–2)
WBC # BLD AUTO: 6.9 10E9/L (ref 4–11)
WBC #/AREA URNS AUTO: 1 /HPF (ref 0–5)

## 2020-09-28 PROCEDURE — 96376 TX/PRO/DX INJ SAME DRUG ADON: CPT

## 2020-09-28 PROCEDURE — 99223 1ST HOSP IP/OBS HIGH 75: CPT | Mod: AI | Performed by: HOSPITALIST

## 2020-09-28 PROCEDURE — 71250 CT THORAX DX C-: CPT

## 2020-09-28 PROCEDURE — 99285 EMERGENCY DEPT VISIT HI MDM: CPT | Mod: 25

## 2020-09-28 PROCEDURE — 87040 BLOOD CULTURE FOR BACTERIA: CPT | Performed by: EMERGENCY MEDICINE

## 2020-09-28 PROCEDURE — C9803 HOPD COVID-19 SPEC COLLECT: HCPCS

## 2020-09-28 PROCEDURE — 25800030 ZZH RX IP 258 OP 636: Performed by: EMERGENCY MEDICINE

## 2020-09-28 PROCEDURE — 80053 COMPREHEN METABOLIC PANEL: CPT | Performed by: EMERGENCY MEDICINE

## 2020-09-28 PROCEDURE — 25000132 ZZH RX MED GY IP 250 OP 250 PS 637: Performed by: EMERGENCY MEDICINE

## 2020-09-28 PROCEDURE — 96375 TX/PRO/DX INJ NEW DRUG ADDON: CPT

## 2020-09-28 PROCEDURE — 81001 URINALYSIS AUTO W/SCOPE: CPT | Performed by: EMERGENCY MEDICINE

## 2020-09-28 PROCEDURE — 12000011 ZZH R&B MS OVERFLOW

## 2020-09-28 PROCEDURE — 96367 TX/PROPH/DG ADDL SEQ IV INF: CPT

## 2020-09-28 PROCEDURE — 96365 THER/PROPH/DIAG IV INF INIT: CPT

## 2020-09-28 PROCEDURE — 85025 COMPLETE CBC W/AUTO DIFF WBC: CPT | Performed by: EMERGENCY MEDICINE

## 2020-09-28 PROCEDURE — 96361 HYDRATE IV INFUSION ADD-ON: CPT

## 2020-09-28 PROCEDURE — 87086 URINE CULTURE/COLONY COUNT: CPT | Performed by: EMERGENCY MEDICINE

## 2020-09-28 PROCEDURE — 25000128 H RX IP 250 OP 636: Performed by: EMERGENCY MEDICINE

## 2020-09-28 PROCEDURE — U0003 INFECTIOUS AGENT DETECTION BY NUCLEIC ACID (DNA OR RNA); SEVERE ACUTE RESPIRATORY SYNDROME CORONAVIRUS 2 (SARS-COV-2) (CORONAVIRUS DISEASE [COVID-19]), AMPLIFIED PROBE TECHNIQUE, MAKING USE OF HIGH THROUGHPUT TECHNOLOGIES AS DESCRIBED BY CMS-2020-01-R: HCPCS | Performed by: HOSPITALIST

## 2020-09-28 PROCEDURE — 83605 ASSAY OF LACTIC ACID: CPT | Performed by: EMERGENCY MEDICINE

## 2020-09-28 PROCEDURE — 25000125 ZZHC RX 250: Performed by: EMERGENCY MEDICINE

## 2020-09-28 PROCEDURE — 72131 CT LUMBAR SPINE W/O DYE: CPT

## 2020-09-28 RX ORDER — HYDROCODONE BITARTRATE AND ACETAMINOPHEN 5; 325 MG/1; MG/1
1 TABLET ORAL ONCE
Status: COMPLETED | OUTPATIENT
Start: 2020-09-28 | End: 2020-09-28

## 2020-09-28 RX ORDER — HYDROMORPHONE HYDROCHLORIDE 1 MG/ML
0.3 INJECTION, SOLUTION INTRAMUSCULAR; INTRAVENOUS; SUBCUTANEOUS ONCE
Status: COMPLETED | OUTPATIENT
Start: 2020-09-28 | End: 2020-09-28

## 2020-09-28 RX ORDER — METOPROLOL TARTRATE 50 MG
TABLET ORAL
Qty: 90 TABLET | Refills: 3 | OUTPATIENT
Start: 2020-09-28

## 2020-09-28 RX ORDER — CEFTRIAXONE 1 G/1
1 INJECTION, POWDER, FOR SOLUTION INTRAMUSCULAR; INTRAVENOUS ONCE
Status: COMPLETED | OUTPATIENT
Start: 2020-09-28 | End: 2020-09-28

## 2020-09-28 RX ORDER — DOXYCYCLINE 100 MG/10ML
100 INJECTION, POWDER, LYOPHILIZED, FOR SOLUTION INTRAVENOUS ONCE
Status: COMPLETED | OUTPATIENT
Start: 2020-09-28 | End: 2020-09-28

## 2020-09-28 RX ADMIN — HYDROMORPHONE HYDROCHLORIDE 0.3 MG: 1 INJECTION, SOLUTION INTRAMUSCULAR; INTRAVENOUS; SUBCUTANEOUS at 22:06

## 2020-09-28 RX ADMIN — DOXYCYCLINE 100 MG: 100 INJECTION, POWDER, LYOPHILIZED, FOR SOLUTION INTRAVENOUS at 22:40

## 2020-09-28 RX ADMIN — CEFTRIAXONE SODIUM 1 G: 1 INJECTION, POWDER, FOR SOLUTION INTRAMUSCULAR; INTRAVENOUS at 22:04

## 2020-09-28 RX ADMIN — HYDROCODONE BITARTRATE AND ACETAMINOPHEN 1 TABLET: 5; 325 TABLET ORAL at 20:42

## 2020-09-28 RX ADMIN — HYDROMORPHONE HYDROCHLORIDE 0.3 MG: 1 INJECTION, SOLUTION INTRAMUSCULAR; INTRAVENOUS; SUBCUTANEOUS at 23:53

## 2020-09-28 RX ADMIN — SODIUM CHLORIDE 500 ML: 9 INJECTION, SOLUTION INTRAVENOUS at 22:04

## 2020-09-28 ASSESSMENT — ENCOUNTER SYMPTOMS
BACK PAIN: 1
SHORTNESS OF BREATH: 0
NAUSEA: 0
CHILLS: 1
ABDOMINAL PAIN: 1
DYSURIA: 0
COUGH: 0
FEVER: 1

## 2020-09-28 ASSESSMENT — MIFFLIN-ST. JEOR: SCORE: 1335.36

## 2020-09-28 NOTE — ED TRIAGE NOTES
Back pain x 1 wk, seen here and by his PMD, today still having back pain advised to come back to the ER, also c/o chills now.

## 2020-09-29 ENCOUNTER — OFFICE VISIT (OUTPATIENT)
Dept: INTERPRETER SERVICES | Facility: CLINIC | Age: 85
End: 2020-09-29
Payer: COMMERCIAL

## 2020-09-29 PROBLEM — J18.9 COMMUNITY ACQUIRED PNEUMONIA: Status: ACTIVE | Noted: 2020-09-29

## 2020-09-29 LAB
ANION GAP SERPL CALCULATED.3IONS-SCNC: <1 MMOL/L (ref 3–14)
BACTERIA SPEC CULT: NO GROWTH
BUN SERPL-MCNC: 77 MG/DL (ref 7–30)
CALCIUM SERPL-MCNC: 9.1 MG/DL (ref 8.5–10.1)
CHLORIDE SERPL-SCNC: 109 MMOL/L (ref 94–109)
CO2 SERPL-SCNC: 29 MMOL/L (ref 20–32)
CREAT SERPL-MCNC: 1.9 MG/DL (ref 0.66–1.25)
ERYTHROCYTE [DISTWIDTH] IN BLOOD BY AUTOMATED COUNT: 17.5 % (ref 10–15)
GFR SERPL CREATININE-BSD FRML MDRD: 31 ML/MIN/{1.73_M2}
GLUCOSE SERPL-MCNC: 90 MG/DL (ref 70–99)
GRAM STN SPEC: NORMAL
HCT VFR BLD AUTO: 27.2 % (ref 40–53)
HGB BLD-MCNC: 8.2 G/DL (ref 13.3–17.7)
Lab: NORMAL
Lab: NORMAL
MCH RBC QN AUTO: 20.3 PG (ref 26.5–33)
MCHC RBC AUTO-ENTMCNC: 30.1 G/DL (ref 31.5–36.5)
MCV RBC AUTO: 67 FL (ref 78–100)
PLATELET # BLD AUTO: 116 10E9/L (ref 150–450)
POTASSIUM SERPL-SCNC: 5.3 MMOL/L (ref 3.4–5.3)
RBC # BLD AUTO: 4.04 10E12/L (ref 4.4–5.9)
SARS-COV-2 RNA SPEC QL NAA+PROBE: NORMAL
SODIUM SERPL-SCNC: 138 MMOL/L (ref 133–144)
SPECIMEN SOURCE: NORMAL
WBC # BLD AUTO: 4.6 10E9/L (ref 4–11)

## 2020-09-29 PROCEDURE — 82306 VITAMIN D 25 HYDROXY: CPT | Performed by: STUDENT IN AN ORGANIZED HEALTH CARE EDUCATION/TRAINING PROGRAM

## 2020-09-29 PROCEDURE — T1013 SIGN LANG/ORAL INTERPRETER: HCPCS | Mod: U3

## 2020-09-29 PROCEDURE — 25000125 ZZHC RX 250: Performed by: HOSPITALIST

## 2020-09-29 PROCEDURE — 87205 SMEAR GRAM STAIN: CPT | Performed by: HOSPITALIST

## 2020-09-29 PROCEDURE — 40000275 ZZH STATISTIC RCP TIME EA 10 MIN

## 2020-09-29 PROCEDURE — 12000011 ZZH R&B MS OVERFLOW

## 2020-09-29 PROCEDURE — 25000128 H RX IP 250 OP 636: Performed by: HOSPITALIST

## 2020-09-29 PROCEDURE — 99233 SBSQ HOSP IP/OBS HIGH 50: CPT | Performed by: STUDENT IN AN ORGANIZED HEALTH CARE EDUCATION/TRAINING PROGRAM

## 2020-09-29 PROCEDURE — 87070 CULTURE OTHR SPECIMN AEROBIC: CPT | Performed by: HOSPITALIST

## 2020-09-29 PROCEDURE — 80048 BASIC METABOLIC PNL TOTAL CA: CPT | Performed by: HOSPITALIST

## 2020-09-29 PROCEDURE — 25000132 ZZH RX MED GY IP 250 OP 250 PS 637: Performed by: STUDENT IN AN ORGANIZED HEALTH CARE EDUCATION/TRAINING PROGRAM

## 2020-09-29 PROCEDURE — 36415 COLL VENOUS BLD VENIPUNCTURE: CPT | Performed by: HOSPITALIST

## 2020-09-29 PROCEDURE — 25000132 ZZH RX MED GY IP 250 OP 250 PS 637: Performed by: HOSPITALIST

## 2020-09-29 PROCEDURE — 85027 COMPLETE CBC AUTOMATED: CPT | Performed by: HOSPITALIST

## 2020-09-29 PROCEDURE — 94640 AIRWAY INHALATION TREATMENT: CPT

## 2020-09-29 RX ORDER — ACETAMINOPHEN 325 MG/1
975 TABLET ORAL 3 TIMES DAILY
Status: DISCONTINUED | OUTPATIENT
Start: 2020-09-29 | End: 2020-10-01 | Stop reason: HOSPADM

## 2020-09-29 RX ORDER — ASPIRIN 81 MG/1
81 TABLET ORAL DAILY
Status: DISCONTINUED | OUTPATIENT
Start: 2020-09-29 | End: 2020-10-01 | Stop reason: HOSPADM

## 2020-09-29 RX ORDER — SIMVASTATIN 40 MG
40 TABLET ORAL AT BEDTIME
Status: DISCONTINUED | OUTPATIENT
Start: 2020-09-29 | End: 2020-10-01 | Stop reason: HOSPADM

## 2020-09-29 RX ORDER — ONDANSETRON 4 MG/1
4 TABLET, ORALLY DISINTEGRATING ORAL EVERY 6 HOURS PRN
Status: DISCONTINUED | OUTPATIENT
Start: 2020-09-29 | End: 2020-10-01 | Stop reason: HOSPADM

## 2020-09-29 RX ORDER — LIDOCAINE 4 G/G
1 PATCH TOPICAL
Status: DISCONTINUED | OUTPATIENT
Start: 2020-09-29 | End: 2020-10-01 | Stop reason: HOSPADM

## 2020-09-29 RX ORDER — PANTOPRAZOLE SODIUM 40 MG/1
40 TABLET, DELAYED RELEASE ORAL
Status: DISCONTINUED | OUTPATIENT
Start: 2020-09-29 | End: 2020-10-01 | Stop reason: HOSPADM

## 2020-09-29 RX ORDER — METOPROLOL TARTRATE 25 MG/1
25 TABLET, FILM COATED ORAL 2 TIMES DAILY
Status: DISCONTINUED | OUTPATIENT
Start: 2020-09-29 | End: 2020-10-01 | Stop reason: HOSPADM

## 2020-09-29 RX ORDER — AMOXICILLIN 250 MG
1 CAPSULE ORAL 2 TIMES DAILY PRN
Status: DISCONTINUED | OUTPATIENT
Start: 2020-09-29 | End: 2020-10-01 | Stop reason: HOSPADM

## 2020-09-29 RX ORDER — DOXYCYCLINE 100 MG/10ML
100 INJECTION, POWDER, LYOPHILIZED, FOR SOLUTION INTRAVENOUS EVERY 12 HOURS
Status: DISCONTINUED | OUTPATIENT
Start: 2020-09-29 | End: 2020-09-29

## 2020-09-29 RX ORDER — PROCHLORPERAZINE 25 MG
12.5 SUPPOSITORY, RECTAL RECTAL EVERY 12 HOURS PRN
Status: DISCONTINUED | OUTPATIENT
Start: 2020-09-29 | End: 2020-10-01 | Stop reason: HOSPADM

## 2020-09-29 RX ORDER — PROCHLORPERAZINE MALEATE 5 MG
5 TABLET ORAL EVERY 6 HOURS PRN
Status: DISCONTINUED | OUTPATIENT
Start: 2020-09-29 | End: 2020-10-01 | Stop reason: HOSPADM

## 2020-09-29 RX ORDER — DOXYCYCLINE 100 MG/1
100 CAPSULE ORAL EVERY 12 HOURS SCHEDULED
Status: DISCONTINUED | OUTPATIENT
Start: 2020-09-29 | End: 2020-10-01 | Stop reason: HOSPADM

## 2020-09-29 RX ORDER — NALOXONE HYDROCHLORIDE 0.4 MG/ML
.1-.4 INJECTION, SOLUTION INTRAMUSCULAR; INTRAVENOUS; SUBCUTANEOUS
Status: DISCONTINUED | OUTPATIENT
Start: 2020-09-29 | End: 2020-10-01 | Stop reason: HOSPADM

## 2020-09-29 RX ORDER — POLYETHYLENE GLYCOL 3350 17 G/17G
17 POWDER, FOR SOLUTION ORAL DAILY PRN
Status: DISCONTINUED | OUTPATIENT
Start: 2020-09-29 | End: 2020-10-01 | Stop reason: HOSPADM

## 2020-09-29 RX ORDER — AMOXICILLIN 250 MG
2 CAPSULE ORAL 2 TIMES DAILY PRN
Status: DISCONTINUED | OUTPATIENT
Start: 2020-09-29 | End: 2020-10-01 | Stop reason: HOSPADM

## 2020-09-29 RX ORDER — CEFTRIAXONE 2 G/1
2 INJECTION, POWDER, FOR SOLUTION INTRAMUSCULAR; INTRAVENOUS EVERY 24 HOURS
Status: DISCONTINUED | OUTPATIENT
Start: 2020-09-29 | End: 2020-10-01 | Stop reason: HOSPADM

## 2020-09-29 RX ORDER — BISACODYL 10 MG
10 SUPPOSITORY, RECTAL RECTAL DAILY PRN
Status: DISCONTINUED | OUTPATIENT
Start: 2020-09-29 | End: 2020-10-01 | Stop reason: HOSPADM

## 2020-09-29 RX ORDER — LIDOCAINE 40 MG/G
CREAM TOPICAL
Status: DISCONTINUED | OUTPATIENT
Start: 2020-09-29 | End: 2020-10-01 | Stop reason: HOSPADM

## 2020-09-29 RX ORDER — ONDANSETRON 2 MG/ML
4 INJECTION INTRAMUSCULAR; INTRAVENOUS EVERY 6 HOURS PRN
Status: DISCONTINUED | OUTPATIENT
Start: 2020-09-29 | End: 2020-10-01 | Stop reason: HOSPADM

## 2020-09-29 RX ORDER — IPRATROPIUM BROMIDE AND ALBUTEROL SULFATE 2.5; .5 MG/3ML; MG/3ML
3 SOLUTION RESPIRATORY (INHALATION)
Status: DISCONTINUED | OUTPATIENT
Start: 2020-09-29 | End: 2020-10-01 | Stop reason: HOSPADM

## 2020-09-29 RX ORDER — BUDESONIDE 0.5 MG/2ML
0.5 INHALANT ORAL ONCE
Status: COMPLETED | OUTPATIENT
Start: 2020-09-29 | End: 2020-09-29

## 2020-09-29 RX ORDER — BUDESONIDE 0.5 MG/2ML
0.5 INHALANT ORAL 2 TIMES DAILY
Status: DISCONTINUED | OUTPATIENT
Start: 2020-09-29 | End: 2020-09-29

## 2020-09-29 RX ORDER — MONTELUKAST SODIUM 10 MG/1
10 TABLET ORAL DAILY
Status: DISCONTINUED | OUTPATIENT
Start: 2020-09-29 | End: 2020-10-01 | Stop reason: HOSPADM

## 2020-09-29 RX ADMIN — CEFTRIAXONE SODIUM 2 G: 2 INJECTION, POWDER, FOR SOLUTION INTRAMUSCULAR; INTRAVENOUS at 21:03

## 2020-09-29 RX ADMIN — PANTOPRAZOLE SODIUM 40 MG: 40 TABLET, DELAYED RELEASE ORAL at 16:31

## 2020-09-29 RX ADMIN — ACETAMINOPHEN 975 MG: 325 TABLET, FILM COATED ORAL at 09:20

## 2020-09-29 RX ADMIN — MONTELUKAST 10 MG: 10 TABLET, FILM COATED ORAL at 09:22

## 2020-09-29 RX ADMIN — BUDESONIDE 0.5 MG: 0.5 INHALANT RESPIRATORY (INHALATION) at 02:30

## 2020-09-29 RX ADMIN — ACETAMINOPHEN 975 MG: 325 TABLET, FILM COATED ORAL at 21:02

## 2020-09-29 RX ADMIN — Medication 2.5 MG: at 02:16

## 2020-09-29 RX ADMIN — DOXYCYCLINE 100 MG: 100 INJECTION, POWDER, LYOPHILIZED, FOR SOLUTION INTRAVENOUS at 09:30

## 2020-09-29 RX ADMIN — FLUTICASONE FUROATE 1 PUFF: 100 POWDER RESPIRATORY (INHALATION) at 11:06

## 2020-09-29 RX ADMIN — IPRATROPIUM BROMIDE AND ALBUTEROL 1 PUFF: 20; 100 SPRAY, METERED RESPIRATORY (INHALATION) at 21:03

## 2020-09-29 RX ADMIN — IPRATROPIUM BROMIDE AND ALBUTEROL 1 PUFF: 20; 100 SPRAY, METERED RESPIRATORY (INHALATION) at 15:05

## 2020-09-29 RX ADMIN — ACETAMINOPHEN 975 MG: 325 TABLET, FILM COATED ORAL at 13:50

## 2020-09-29 RX ADMIN — IPRATROPIUM BROMIDE AND ALBUTEROL 1 PUFF: 20; 100 SPRAY, METERED RESPIRATORY (INHALATION) at 11:06

## 2020-09-29 RX ADMIN — SIMVASTATIN 40 MG: 40 TABLET, FILM COATED ORAL at 02:16

## 2020-09-29 RX ADMIN — LIDOCAINE 1 PATCH: 560 PATCH PERCUTANEOUS; TOPICAL; TRANSDERMAL at 09:23

## 2020-09-29 RX ADMIN — DOXYCYCLINE 100 MG: 100 CAPSULE ORAL at 21:02

## 2020-09-29 RX ADMIN — SIMVASTATIN 40 MG: 40 TABLET, FILM COATED ORAL at 21:03

## 2020-09-29 RX ADMIN — ASPIRIN 81 MG: 81 TABLET, COATED ORAL at 09:22

## 2020-09-29 RX ADMIN — PANTOPRAZOLE SODIUM 40 MG: 40 TABLET, DELAYED RELEASE ORAL at 09:21

## 2020-09-29 ASSESSMENT — ACTIVITIES OF DAILY LIVING (ADL)
ADLS_ACUITY_SCORE: 16
TOILETING: 0-->INDEPENDENT
ADLS_ACUITY_SCORE: 16
ADLS_ACUITY_SCORE: 16
RETIRED_EATING: 0-->INDEPENDENT
ADLS_ACUITY_SCORE: 17
BATHING: 0-->INDEPENDENT
ADLS_ACUITY_SCORE: 16
RETIRED_COMMUNICATION: 0-->UNDERSTANDS/COMMUNICATES WITHOUT DIFFICULTY
DRESS: 0-->INDEPENDENT
SWALLOWING: 0-->SWALLOWS FOODS/LIQUIDS WITHOUT DIFFICULTY

## 2020-09-29 ASSESSMENT — MIFFLIN-ST. JEOR: SCORE: 1348.51

## 2020-09-29 NOTE — PLAN OF CARE
Pt arrived @ 0120 from ED with c/o back pain and PNA.  at bedside, pt requests to have paper in order to write questions at bedside. Pt not able to see from L eye and requests staff to approach bed from R side. Special precautions maintained, Covid results pending. A&Ox4. VSS on 2L NC. Low sat fat, Na <2400mg diet. BLE 2+ edema. IV-SL. Up Ax1 with cane to bathroom, voiding adequately. PRN low dose Oxycodone given for back pain. Denies SOB, dizziness/lightheadedness, and SOB. OT/PT consults in place.

## 2020-09-29 NOTE — CONSULTS
Chart reviewed, received consult to see patient for low K, renal diet education   Patient currently in the observation unit, R/O COVID pending.  Noted patient will need , therefore will see patient once COVID negative as likely unable to instruct over the phone.     Talia Johnson RD, LD, [facility]   Clinical Dietitian - [facility]

## 2020-09-29 NOTE — ED PROVIDER NOTES
History     Chief Complaint:  Back Pain      A  was used (ASL).      Shiraz Ingram is a 85 year old male with a history of aortic stenosis, congestive heart failure, and coronary artery disease who presents with back pain. The patient was evaluated in the Emergency Department on 9/17 for acute midline low back pain and was started on valium and medrol. On 9/22 he was evaluated in clinic and was prescribed norco. The patient reports that he has been taking norco as prescribed but his back pain has persisted. Upon standing he endorses right lower quadrant abdominal pain. Today he began experiencing chills, he did not take his temperature at home. Three hours prior to arrival the patient had a nebulizer treatment as he was feeling quite short of breath. At the time of interview his breathing is at his baseline and has been having normal bowel movements. There have been no falls. He denies dysuria, nausea, and cough. No history of back surgery.     Allergies:  No known allergies     Medications:    Albuterol   Aspirin  Pulmicort   Calcium carbonate   Pradaxa   Iron  Lasix   Lisinopril   Meclizine   Melatonin  Metoprolol tartrate   Singulair   Nitroglycerin  Pantoprazole   Potassium chloride   Simvastatin      Past Medical History:    Anemia   Aortic stenosis  Neoplasm of colon  Chronic kidney disease   Coronary artery disease   Congestive heart failure   Hyperlipidemia   Hypertension   Osteoarthritis   Asthma   Atrial fibrillation   Respiratory failure     Past Surgical History:    Coronary artery bypass  CV angiogram   CV right heart cath   Aortic valve replacement   Pacemaker   Esophagoscopy  Herniorrhaphy inguinal   laparoscopic cholecystectomy     Family History:    Father: coronary artery disease   Mother: breast cancer     Social History:  Smoking Status: Former smoker   Smokeless Tobacco: Never Used  Alcohol Use: Yes  Drug Use: No  PCP: Dany Rodriguez  Marital Status:    "[2]      Review of Systems   Constitutional: Positive for chills and fever.   Respiratory: Negative for cough and shortness of breath.    Gastrointestinal: Positive for abdominal pain. Negative for nausea.   Genitourinary: Negative for dysuria.   Musculoskeletal: Positive for back pain.        Positive right hip pain   All other systems reviewed and are negative.    Physical Exam     Patient Vitals for the past 24 hrs:   BP Temp Temp src Pulse Resp SpO2 Height Weight   09/28/20 2330 135/78 -- -- 51 22 96 % -- --   09/28/20 2300 115/53 -- -- 57 26 97 % -- --   09/28/20 2230 103/59 -- -- 57 -- -- -- --   09/28/20 2225 -- -- -- -- 22 96 % -- --   09/28/20 2200 110/68 -- -- 58 13 96 % -- --   09/28/20 2140 117/60 -- -- 54 13 99 % -- --   09/28/20 2045 127/70 -- -- 54 -- -- -- --   09/28/20 2040 -- -- -- -- 11 95 % -- --   09/28/20 1745 113/43 99.9  F (37.7  C) Temporal 67 14 92 % 1.727 m (5' 8\") 67.6 kg (149 lb)      Physical Exam    General: Sitting up in bed  Eyes:  The pupils are equal and round    Conjunctivae and sclerae are normal  ENT:    Wearing a mask  Neck:  Normal range of motion  CV:  Irregular rate and rhythm     Skin warm and well perfused     Pacemaker on left side of chest  Resp:  Non labored breathing on room air    No tachypnea    No cough heard  GI:  Abdomen is soft, there is no rigidity    No distension    No rebound tenderness     Mild RLQ tenderness  MS:  Tenderness on low back  Skin:  No rash or acute skin lesions noted  Neuro:   Awake, alert.      Speech is normal and fluent.    Face is symmetric.     Moves all extremities equally    SILT on bilateral LE  Psych: Normal affect.  Appropriate interactions.    Emergency Department Course   ECG:  ECG taken at 2217, ECG read at 2223  Atrial fibrillation with slow ventricular response with frequent ventricular paced complexes   Right bundle branch block   Left anterior fascicular block   Bifascicular block   Inferior infarct age undetermined   Abnormal  " ECG  No significant change when compared to EKG dated 9/28/20.   Rate 57 bpm. PA interval * ms. QRS duration 150 ms. QT/QTc 462/449 ms. P-R-T axes * -68 17.     Imaging:  Radiology findings were communicated with the patient who voiced understanding of the findings.    Lumbar spine CT w/o contrast  1. Acute-to-subacute appearing mild superior endplate compression  fracture of L2.  2. Chronic-appearing compression/contour deformities involving T12,  L3, and L5.  3. No high-grade spinal canal or neural foraminal stenosis.  4. Infrarenal abdominal aortic ectasia measuring up to 2.7-2.8 cm in  diameter in the axial plane.  5. Multiple renal masses, better characterized on CT of the chest,  abdomen and pelvis of same date. Please see that separate report for  additional details  Reading per radiology     CT Chest Abdomen Pelvis w/o Contrast  1.  New dense consolidation at the right lung, dominantly seen at the  right upper lobe consistent with lobar pneumonia. There is also  involvement of the right middle lobe and right lower lobe.  2.  Numerous bilateral pulmonary nodules appear grossly stable.  3.  Stable mildly prominent mediastinal lymph node.  4.  Stable multiple hyperdense renal lesions. These could be  hemorrhagic or proteinaceous cysts. Solid renal lesion is not  excluded.  5.  Diffuse vascular calcifications.  Reading per radiology     Laboratory:  Laboratory findings were communicated with the patient who voiced understanding of the findings.    Blood cultures x2: pending   CBC: WBC 6.9, HGB 9.1 (L),  (L)   CMP: potassium 5.4 (H), BUN 80 (H), GFR 31 (L), albumin 3.2 (L) glucose 113 (H) o/w WNL (Creatinine 1.93 (H))  Lactic acid: 0.6 (L)     UA with microscopic: WNL   Urine culture: pending     Interventions:  2042 Norco 1 tablet oral  2204 NS bolus 500 ml IV   Rocephin 1g IV   2206 Dilaudid 0.3 mg IV   2240 Vibramycin 100 mg IV     Emergency Department Course:  Nursing notes and vitals reviewed.    7:39  PM  I performed an exam of the patient as documented above.     The patient was sent for a lumbar spine CT and CT chest abdomen pelvis embolism and  while in the emergency department, results above.     IV was inserted and blood was drawn for laboratory testing, results above.    10:24 PM  I spoke with Dr. So of the Hospitalist service from [facility] regarding patient's presentation, findings, and plan of care.     Findings and plan explained to the Patient who consents to admission. Discussed the patient with Dr. So, who will admit the patient to a [facility] bed for further monitoring, evaluation, and treatment.     Impression & Plan      Medical Decision Making:  Shiraz Ingram is a 85 year old male who presents to the emergency department today for evaluation of back pain. Did throw up today and given this and his pain, obtained CT studies. Did endorse shortness of breath earlier so CT chest obtained. Imaging showed findings of pneumonia on CT chest. Less likely covid given right sided only. Denies aspiration. Back pain likely from the acute on subacute compression fractures. Doubt epidural hematoma, infection. No evidence of cauda equina. Given IV antibiotics for pneumonia. Has several incidental findings on imaging that are known about. Will hospitalize for back pain, pain control and antibiotics.     Diagnosis:    ICD-10-CM    1. Pneumonia of right lung due to infectious organism, unspecified part of lung  J18.9 CBC with platelets differential     Urine Culture     Blood culture     Blood culture     Symptomatic COVID-19 Virus (Coronavirus) by PCR   2. Nausea and vomiting, intractability of vomiting not specified, unspecified vomiting type  R11.2    3. Compression deformity of vertebra  M43.9        Disposition:   The patient is admitted into the care of Dr. So.     Scribe Disclosure:  Josee RUTH, am serving as a scribe at 7:23 PM on 9/28/2020 to document services personally  performed by Layla Sharma MD based on my observations and the provider's statements to me.       EMERGENCY DEPARTMENT       Layla Sharma MD  09/29/20 0414

## 2020-09-29 NOTE — PROGRESS NOTES
RECEIVING UNIT ED HANDOFF REVIEW    ED Nurse Handoff Report was reviewed by: Erica Witt RN on September 29, 2020 at 12:48 AM

## 2020-09-29 NOTE — ED NOTES
"[facility]  ED Nurse Handoff Report    ED Chief complaint: Back Pain      ED Diagnosis:   Final diagnoses:   Pneumonia of right lung due to infectious organism, unspecified part of lung   Nausea and vomiting, intractability of vomiting not specified, unspecified vomiting type   Compression deformity of vertebra       Code Status: not addressed by ED RN.    Allergies:   Allergies   Allergen Reactions     No Known Drug Allergies        Patient Story: Pt had acute low back pain on 9/17. Since that time he was prescribed steroid, valium, Norco with physical therapy. Today he again reports lower back pain rated 9/10 and chills. Temperature normal. States he has infrequent cough \"with a spot of red\".   Focused Assessment:  BP soft around 100 systolic. Back pain severe.    Treatments and/or interventions provided: Norco, 0.3 IV Dilaudid  Patient's response to treatments and/or interventions: Pain slightly improved.    To be done/followed up on inpatient unit:  pain control    Does this patient have any cognitive concerns?: none.    Activity level - Baseline/Home:  Independent  Activity Level - Current:   Independent    Patient's Preferred language: American Sign Language   Needed?: Yes    Isolation: COVID r/o and special precautions  Infection: COVID r/o and special precautions  Bariatric?: No    Vital Signs:   Vitals:    09/28/20 2200 09/28/20 2225 09/28/20 2230 09/28/20 2300   BP: 110/68  103/59 115/53   Pulse: 58  57 57   Resp: 13 22  26   Temp:       TempSrc:       SpO2: 96% 96%  97%   Weight:       Height:           Cardiac Rhythm:     Was the PSS-3 completed:   Yes  What interventions are required if any?               Family Comments: Satya Maharaj contacted and would like updates [phone].  OBS brochure/video discussed/provided to patient/family: N/A                For the majority of the shift this patient's behavior was Green.   Behavioral interventions performed were Pt is hard of " hearing and needs .    ED NURSE PHONE NUMBER: [phone]

## 2020-09-29 NOTE — PLAN OF CARE
PT: After chart review, planning to hold evaluation this date secondary to pending Covid test, further workup, and need for  present for session.

## 2020-09-29 NOTE — PROGRESS NOTES
Renal Medicine       Data reviewed:    1.  CKD 3   -baseline creatinine 1.5 mg/dl range   2.  ARF   -pre renal v ATN   -bland UA  3.  High normal K        Agree with holding ACE and diuretic  IVF as indicated overnight      Will see formally once COVID-19 negative        Recent Labs   Lab 09/29/20  0812      POTASSIUM 5.3   CHLORIDE 109   CO2 29   ANIONGAP <1*   GLC 90   BUN 77*   CR 1.90*   GFRESTIMATED 31*   GFRESTBLACK 36*   AYALA 9.1     Recent Labs   Lab Test 09/29/20  0812 09/28/20  2029 08/13/20  1245 07/29/20  0558 07/28/20  0643 07/27/20  1906 07/16/20  0527 07/15/20  0527 07/14/20  1650 07/13/20  1420   CR 1.90* 1.93* 2.08* 1.45* 1.33* 1.37* 1.37* 1.41* 1.36* 1.40*           BRICE Ferrell    [facility]  [phone]

## 2020-09-29 NOTE — PHARMACY-ADMISSION MEDICATION HISTORY
Pharmacy Medication History  Admission medication history interview status for the 9/28/2020  admission is complete. See EPIC admission navigator for prior to admission medications     Medication history sources: Patient and Surescripts (with )  Medication history source reliability: Good  Adherence assessment: Good    Medication reconciliation completed by provider prior to medication history? No    Time spent in this activity: 10 minutes      Prior to Admission medications    Medication Sig Last Dose Taking? Auth Provider   albuterol (PROAIR HFA/PROVENTIL HFA/VENTOLIN HFA) 108 (90 Base) MCG/ACT inhaler Inhale 2 puffs into the lungs 4 times daily as needed for shortness of breath / dyspnea or wheezing Past Week at Unknown time Yes Reported, Patient   aspirin (ASA) 81 MG EC tablet Take 81 mg by mouth daily 9/28/2020 at 0800 Yes Reported, Patient   budesonide (PULMICORT) 0.5 MG/2ML nebulizer solution Take 2 mLs (0.5 mg) by nebulization 2 times daily 9/28/2020 at 0800 Yes Dany Rodriguez MD   calcium carbonate 600 mg-vitamin D 400 units (CALTRATE) 600-400 MG-UNIT per tablet Take 1 tablet by mouth daily 9/28/2020 at 0800 Yes Unknown, Entered By History   dabigatran ANTICOAGULANT (PRADAXA) 150 MG capsule Take 1 capsule (150 mg) by mouth 2 times daily Store in original 's bottle or blister pack; use within 120 days of opening. 9/28/2020 at 0800 Yes Ryan Samuel MD   ferrous sulfate (IRON) 325 (65 Fe) MG tablet TAKE 1 TABLET(325 MG) BY MOUTH TWICE DAILY 9/28/2020 at 0800 Yes Dany Rodriguez MD   furosemide (LASIX) 20 MG tablet Take 1 tablet (20 mg) by mouth daily 9/28/2020 at Unknown time Yes Daksha Smith APRN CNP   ipratropium - albuterol 0.5 mg/2.5 mg/3 mL (DUONEB) 0.5-2.5 (3) MG/3ML nebulization Inhale 1 vial (3 mLs) into the lungs 4 times daily 9/28/2020 at Unknown time Yes Dany Rodriguez MD   lisinopril (ZESTRIL) 20 MG tablet Take 1 tablet (20 mg) by  mouth daily 9/28/2020 at 0800 Yes Dany Rodriguez MD   meclizine (ANTIVERT) 12.5 MG tablet Take 1 tablet (12.5 mg) by mouth 3 times daily as needed for dizziness Past Week at Unknown time Yes Joon Morris MD   melatonin 3 MG tablet Take 1 tablet (3 mg) by mouth nightly as needed for sleep Past Week at Unknown time Yes Nnamdi Sears MD   metoprolol tartrate (LOPRESSOR) 25 MG tablet Take 1 tablet (25 mg) by mouth 2 times daily 9/28/2020 at 0800 Yes Bozena Groves MD   montelukast (SINGULAIR) 10 MG tablet TAKE 1 TABLET(10 MG) BY MOUTH DAILY 9/28/2020 at 0800 Yes Dany Rodriguez MD   multivitamin, therapeutic (THERA-VIT) TABS tablet Take 1 tablet by mouth every evening  9/28/2020 at 0800 Yes Unknown, Entered By History   pantoprazole (PROTONIX) 40 MG EC tablet TAKE ONE TABLET BY MOUTH TWICE A DAY BEFORE MEALS 9/28/2020 at 0800 Yes Dany Rodriguez MD   potassium chloride ER (KLOR-CON M) 10 MEQ CR tablet Take 1 tablet (10 mEq) by mouth daily 9/28/2020 at 0800 Yes Daksha Smith APRN CNP   simvastatin (ZOCOR) 40 MG tablet TAKE 1 TABLET(40 MG) BY MOUTH AT BEDTIME 9/27/2020 at 2000 Yes Dany Rodriguez MD   nitroGLYcerin (NITROSTAT) 0.4 MG sublingual tablet For chest pain place 1 tablet under the tongue every 5 minutes for 3 doses. If symptoms persist 5 minutes after 1st dose call 911.   Bozena Groves MD

## 2020-09-29 NOTE — PROGRESS NOTES
[facility]    Medicine Progress Note - Hospitalist Service       Date of Admission:  9/28/2020  Assessment & Plan       Shiraz Ingram is a 85 year old deaf male admitted on 9/28/2020.  Past history of A-fib, HTN, CAD s/p CABG, aortic stenosis s/p TAVR, asthma, CKD who presents with low back pain due to compression fracture but was also found to have pneumonia and RD.     Community-acquired pneumonia  Admission CT chest with right-sided infiltrates, the patient is without fever, leukocytosis or complaints of dyspnea.   - Received ceftriaxone and doxycycline in the emergency room which will be continued. - Attempt to obtain sputum gram stain and culture.    - Blood cultures are in process.    - Symptomatic COVID swab is pending    Acute to subacute L2 compression fracture  Back pain was his primary complaint for presentation with CT of the lumbar spine demonstrating fracture as noted.    - We will continue with pain control with Tylenol 975mg 3 times daily, lidocaine patch and low-dose PRN oxycodone.    - We will ask physical therapy and Occupational Therapy to evaluate.    Possible AK I on chronic kidney disease stage III  Hyperkalemia, mild  Admission creatinine 1.93 with BUN of 80, potassium 5.4.  Most recent labs from August 2020 show creatinine of 2.08, however, prior to this creatinine was about 1.4.  Has lasix and lisinopril PTA. Denies NSAID use.   - Received 500 ml bolus on admission  - Hold on further fluids given lower extremity edema and   - Holding PTA lasix and lisinopril   - Repeat BMP in the morning  - Nephrology consult     Renal cysts   Noted on CT scan. Stable since February - but noted in nephrology consult note from 5/12 that they do not appear to be simple cysts - unsure if this was every follow-ed up or discussed further. Possibly contributing to renal dysfunction?   - Nephrology consult     Chronic anemia and thrombocytopenia  Admission hemoglobin 9.1, platelets 144.  This is  stable at baseline.  - Has been seen by cardiology for this, considering bone marrow biopsy.     A. fib status post PPM  CAD status post CABG x3  Aortic stenosis status post TAVR in July 2020  Chronic HFpEF, not in acute exacerbation   Hypertension  Hyperlipidemia  Most recent TTE from August 2020 shows ejection fraction of 50 to 55%, aortic valve functioning well.  Continue prior to admission metoprolol, simvastatin.    -We will hold prior to admission lisinopril, Lasix and potassium supplement due to possible RD and elevated potassium.    - Receiving warning that dabigatran is contraindicated based on patient information.  The reason for this is unclear, I suspect possibly due to decline in renal function.  We will hold on reordering dabigatran. Will see if renal function improved. If not may need to consider switching anticoagulation.   - Will consult pharmacy for possible options     Asthma  Continue prior to admission [place], Sherry logan and duo nebs.         Diet: Combination Diet Low Saturated Fat Na <2400mg Diet    DVT Prophylaxis: PCDs  Pak Catheter: not present  Code Status: No CPR- Do NOT Intubate      Disposition Plan   Expected discharge: 2 - 3 days, recommended to prior living arrangement once adequate pain management/ tolerating PO medications and renal function improved.  Entered: Carola Mary MD 09/29/2020, 10:44 AM       The patient's care was discussed with the Bedside Nurse and Patient.    Carola Mary MD  Hospitalist Service  [facility]    ______________________________________________________________________    Interval History   Doing ok. Pain is a little better, but he hasn't really been up out of bed much. No shortness of breath but does endorse a mild cough. Urinating a lot. Notes increased gas. No other complaints.     Data reviewed today: I reviewed all medications, new labs and imaging results over the last 24 hours. I personally reviewed no images  or EKG's today.    Physical Exam   Vital Signs: Temp: 96  F (35.6  C) Temp src: Oral BP: (!) 114/34 Pulse: 58   Resp: 18 SpO2: 97 % O2 Device: None (Room air) Oxygen Delivery: 2 LPM  Weight: 151 lbs 14.4 oz  General Appearance: Awake, alert, no acute distress, ASL   Respiratory: CTAB   Cardiovascular: irregularly irregular rhythm, systolic murmur noted, 1-2+ edema bilaterally   GI: soft, non-tender   Skin: no rashes or lesion   Neuro: Non-focal     Data   Recent Labs   Lab 09/29/20 0812 09/28/20 2029   WBC 4.6 6.9   HGB 8.2* 9.1*   MCV 67* 66*   * 144*    138   POTASSIUM 5.3 5.4*   CHLORIDE 109 109   CO2 29 25   BUN 77* 80*   CR 1.90* 1.93*   ANIONGAP <1* 4   AYALA 9.1 9.7   GLC 90 113*   ALBUMIN  --  3.2*   PROTTOTAL  --  7.0   BILITOTAL  --  0.6   ALKPHOS  --  64   ALT  --  19   AST  --  16     Recent Results (from the past 24 hour(s))   CT Chest Abdomen Pelvis w/o Contrast    Narrative    CT CHEST ABDOMEN PELVIS WITHOUT CONTRAST 9/28/2020 9:18 PM    CLINICAL HISTORY: Sepsis. Chills/fever, shortness of breath. Right  lower quadrant abdominal pain, vomiting. Evaluate appendix, stone.  Pneumonia.    TECHNIQUE: CT scan of the chest, abdomen, and pelvis was performed  without IV contrast. Multiplanar reformats were obtained. Dose  reduction techniques were used.   CONTRAST: None.    COMPARISON: CT chest abdomen and pelvis 9/2/2020.    FINDINGS:   LUNGS AND PLEURA: Trace right pleural fluid. New extensive dense  irregular consolidation occupying the right upper lobe. There is also  small new nodular areas of consolidation noted at the right middle  lobe, right lower lobe as well. There are additional multiple  bilateral pulmonary nodules again noted as on the prior exam. These  multiple nodules do not show significant short interval change. An  example of one of these is at the periphery of the right lower lobe  continuing to measure 0.6 cm series 6 image 175.    MEDIASTINUM/AXILLAE: Thoracic aortic and  diffuse coronary artery  calcifications. Stable mild cardiomegaly. Stable mediastinal lymph  nodes. Borderline-prominent stable subcarinal lymph node measuring 1.1  cm series 4 image 62. Left chest pacemaker. Aortic root postoperative  change.    HEPATOBILIARY: Cholecystectomy. No acute hepatic abnormality.    PANCREAS: Normal.    SPLEEN: Normal.    ADRENAL GLANDS: Stable minimal thickening.    KIDNEYS/BLADDER: Stable multiple bilateral hyperdense renal lesions,  many of which are exophytic. No hydronephrosis. Unremarkable bladder.    BOWEL: No obstruction or acute inflammation. No free fluid, free air,  or abscess. No evidence to suggest appendicitis.    LYMPH NODES: Normal.    VASCULATURE: Diffuse calcifications.    PELVIC ORGANS: No acute abnormality. Prominent prostate is 5.1 cm.    OTHER: Stable focal locule of fluid at the left inguinal region that  is 2.9 cm series 4 image 266.    MUSCULOSKELETAL: Stable T12 compression fracture deformity. No  aggressive bony lesion otherwise identified.      Impression    IMPRESSION:  1.  New dense consolidation at the right lung, dominantly seen at the  right upper lobe consistent with lobar pneumonia. There is also  involvement of the right middle lobe and right lower lobe.  2.  Numerous bilateral pulmonary nodules appear grossly stable.  3.  Stable mildly prominent mediastinal lymph node.  4.  Stable multiple hyperdense renal lesions. These could be  hemorrhagic or proteinaceous cysts. Solid renal lesion is not  excluded.  5.  Diffuse vascular calcifications.    JERAMIE DELGADILLO MD   Lumbar spine CT w/o contrast    Narrative    CT LUMBAR SPINE WITHOUT CONTRAST  9/28/2020 9:18 PM     HISTORY: Back pain, chills.     TECHNIQUE: Axial images of the lumbar spine were obtained without  intravenous contrast. Multiplanar reformations were performed.   Radiation dose for this scan was reduced using automated exposure  control, adjustment of the mA and/or kV according to patient size,  or  iterative reconstruction technique.     COMPARISON: None. Correlation is made with CT chest, abdomen and  pelvis of same date.    FINDINGS:  Five lumbar-type vertebral bodies are present. There is a  recent-appearing fracture involving the superior endplate of L2 with  mild associated height loss. Chronic moderate to severe compression  fracture involving the superior and inferior endplates of T12. Mild to  moderate superior endplate concavity involving the L3 vertebral body.  Minimal concavity involving the posterior aspect of the L5 inferior  endplate appears chronic.    Alignment of the lumbar spine is within normal limits. The  intervertebral discs are relatively maintained in height. No definite  high-grade spinal canal stenosis. There is mild left L3-L4 neural  foraminal narrowing. Otherwise, no significant neural foraminal  stenosis. There are multiple bilateral renal masses, many of which are  hyperdense as compared to the renal parenchyma. These are better  characterized on CT of the chest, abdomen and pelvis of same date.  Diffuse atherosclerotic disease of the abdominal aorta and its major  branches, with ectasia of the infrarenal abdominal aorta measuring up  to 2.7-2.8 cm in the axial plane. Bilateral sacroiliac joint  degenerative changes.      Impression    IMPRESSION:  1. Acute-to-subacute appearing mild superior endplate compression  fracture of L2.  2. Chronic-appearing compression/contour deformities involving T12,  L3, and L5.  3. No high-grade spinal canal or neural foraminal stenosis.  4. Infrarenal abdominal aortic ectasia measuring up to 2.7-2.8 cm in  diameter in the axial plane.  5. Multiple renal masses, better characterized on CT of the chest,  abdomen and pelvis of same date. Please see that separate report for  additional details    SRIKANTH DO MD     Medications     - MEDICATION INSTRUCTIONS -         acetaminophen  975 mg Oral TID     aspirin  81 mg Oral Daily     [Held by  provider] budesonide  0.5 mg Nebulization BID     cefTRIAXone  2 g Intravenous Q24H     doxycycline (VIBRAMYCIN) IV  100 mg Intravenous Q12H     fluticasone  1 puff Inhalation Daily     [Held by provider] ipratropium - albuterol 0.5 mg/2.5 mg/3 mL  3 mL Nebulization 4x daily     ipratropium-albuterol  1 puff Inhalation 4x daily     lidocaine  1 patch Transdermal Q24H     lidocaine   Transdermal Q8H     metoprolol tartrate  25 mg Oral BID     montelukast  10 mg Oral Daily     pantoprazole  40 mg Oral BID AC     simvastatin  40 mg Oral At Bedtime     sodium chloride (PF)  3 mL Intracatheter Q8H

## 2020-09-29 NOTE — ED NOTES
Spoke with his son Nicko [phone] and updated him that his father will be staying overnight in the hospital. He would like an update tomorrow as well.

## 2020-09-29 NOTE — PHARMACY-PHARMACOTHERAPY NOTE
Anticoag Consult:    I would suggest Eliquis 5 mg bid for AFib on Shiraz Ingram MRN [id number]. You might want to run this by cardiology if this is ok with recent TAVR procedure, but they had him on Pradaxa.    Juan Ferraro [facility]

## 2020-09-29 NOTE — PLAN OF CARE
After thorough chart review decision made to hold OT eval while test/re-test pending for COVID-19 and  available. Will rcheck back tomorrow and initiate as able.

## 2020-09-29 NOTE — H&P
[facility]    History and Physical - Hospitalist Service       Date of Admission:  9/28/2020    Assessment & Plan   Shiraz Ingram is a 85 year old deaf male admitted on 9/28/2020.  Past history of A-fib, HTN, CAD s/p CABG, aortic stenosis s/p TAVR, asthma, CKD who presents with low back pain due to compression fracture but was also found to have pneumonia.       Community-acquired pneumonia  Admission CT chest with right-sided infiltrates, the patient is without fever, leukocytosis or complaints of dyspnea.  Received ceftriaxone and doxycycline in the emergency room which will be continued.  Attempt to obtain sputum gram stain and culture.  Blood cultures are in process.  Symptomatic COVID swab is pending, though have low suspicion for this.    Acute to subacute L2 compression fracture  Back pain was his primary complaint for presentation with CT of the lumbar spine demonstrating fracture as noted.  We will continue with pain control with Tylenol 975mg 3 times daily, lidocaine patch and low-dose PRN oxycodone.  We will ask physical therapy and Occupational Therapy to evaluate.    Possible AK I on chronic kidney disease stage III  Hyperkalemia, mild  Admission creatinine 1.93 with BUN of 80, potassium 5.4.  Most recent labs from August 2020 show creatinine of 2.08, however, prior to this creatinine was about 1.4.  Already received 500 ml bolus in ED, will hold on further fluids given lower extremity edema and repeat BMP in AM.     Chronic anemia and thrombocytopenia  Admission hemoglobin 9.1, platelets 144.  This is stable at baseline.    A. fib status post PPM  CAD status post CABG x3  Aortic stenosis status post TAVR in July 2020  Hypertension  Hyperlipidemia  Most recent TTE from August 2020 shows ejection fraction of 50 to 55%, aortic valve functioning well.  Continue prior to admission metoprolol, simvastatin.  We will hold prior to admission lisinopril, Lasix and potassium supplement due  "to possible RD and elevated potassium.  Receiving warning that dabigatran is contraindicated based on patient information.  The reason for this is unclear, I suspect possibly due to decline in renal function.  We will hold on reordering dabigatran with plan to reevaluate by aliyah ZARATE in the morning.    Asthma  Continue prior to admission Pulmicort, Montella cast and ousmane fontaine.       Diet: Cardiac diet  DVT Prophylaxis: dabigatran  Pak Catheter: not present  Code Status: DNR/DNI per patient  Rule Out COVID-19 Handoff:  Shiraz is a LOW SUSPICION PUI.  Follow these instructions:    If COVID test positive -> continue isolation precautions    If COVID test negative -> discontinue COVID-specific isolation precautions       Disposition Plan   Expected discharge: 2 - 3 days, recommended to prior living arrangement once pain controlled, antibiotic plan in place.  Entered: Sammy So MD 09/28/2020, 11:08 PM     The patient's care was discussed with the Patient.    Sammy So MD  [facility]    ______________________________________________________________________    Chief Complaint   Lumbar spine pain    History is obtained from the patient, interviewed with a professional .  Also spoke with ED physician and reviewed the chart.     History of Present Illness   Shiraz Ingram is a 85 year old male who presents with lumbar spine pain.  He reports onset of sharp pain approximately 2 weeks ago following a physical therapy session where he was using dumbbell weights.  He reports the pain is sharp in nature and has been progressive over the past 2 weeks.  He denies any recent falls or trauma.  He denies any bowel or bladder incontinence, saddle anesthesia, lower extremity weakness or radicular symptoms.  He has been using his wife's cane for ambulation as he reports his right leg does feel \"sluggish\".  He was seen in the emergency room and provided a Medrol Dosepak in addition to Norco which he used " some mild relief.  He reports chronic dyspnea on exertion but denies any acute worsening of this.  He denies any cough, fever or chills.  He reports he has chronic diarrhea and chronic loss of smell.  He denies any other covert symptoms including headache, sore throat, general myalgias or arthralgias.  His remainder of review of systems otherwise negative.      Review of Systems    The 10 point Review of Systems is negative other than noted in the HPI or here.     Past Medical History    I have reviewed this patient's medical history and updated it with pertinent information if needed.   Past Medical History:   Diagnosis Date     Allergic rhinitis, cause unspecified      Anemia, unspecified      Aortic stenosis     severe     Benign neoplasm of colon 1999    Ademonatous polyp     CKD (chronic kidney disease) stage 3, GFR 30-59 ml/min (H) 5/12/2011     Coronary atherosclerosis of unspecified type of vessel, native or graft     s/p CABG 2002     Hyperlipidemia LDL goal < 100      Hypertension goal BP (blood pressure) < 140/90      Localized osteoarthrosis not specified whether primary or secondary, lower leg     left knee     Moderate persistent asthma      Persistent atrial fibrillation (H)      Unspecified hearing loss        Past Surgical History   I have reviewed this patient's surgical history and updated it with pertinent information if needed.  Past Surgical History:   Procedure Laterality Date     C NONSPECIFIC PROCEDURE  5/02    Coronary artery bypass     CARDIAC SURGERY       COLONOSCOPY N/A 5/26/2020    Procedure: COLONOSCOPY;  Surgeon: Ignacio Fournier MD;  Location:  GI     CV ANGIOGRAM CORONARY GRAFT N/A 4/24/2020    Procedure: Angiogram Coronary Graft;  Surgeon: Addison Willard MD;  Location:  HEART CARDIAC CATH LAB     CV CORONARY ANGIOGRAM N/A 4/24/2020    Procedure: Coronary Angiogram;  Surgeon: Addison Willard MD;  Location:  HEART CARDIAC CATH LAB     CV RIGHT HEART CATH N/A  2020    Procedure: Right Heart Cath;  Surgeon: Addison Willard MD;  Location:  HEART CARDIAC CATH LAB     CV TRANSCATHETER AORTIC VALVE REPLACEMENT N/A 2020    Procedure: Transcatheter Aortic Valve Replacement;  Surgeon: Soraida Mnoet MD;  Location:  HEART CARDIAC CATH LAB     EP PACEMAKER N/A 7/15/2020    Procedure: EP Pacemaker;  Surgeon: Tommie Sauer MD;  Location:  HEART CARDIAC CATH LAB     HC COLONOSCOPY THRU STOMA W BIOPSY/CAUTERY TUMOR/POLYP/LESION      Dr. Dow, Q 5 years     HC COLONOSCOPY THRU STOMA W BIOPSY/CAUTERY TUMOR/POLYP/LESION      Dr. Dow, one adenomatous polyp     HC ESOPHAGOSCOPY, DIAGNOSTIC      Dr. Dow, lower esophageal ring & mild gastritis     HERNIORRHAPHY INGUINAL Right 2016    Procedure: HERNIORRHAPHY INGUINAL;  Surgeon: Alexis Alexandra MD;  Location: [facility]     LAPAROSCOPIC CHOLECYSTECTOMY      for biliary sludge       Social History   I have reviewed this patient's social history and updated it with pertinent information if needed.  Social History     Tobacco Use     Smoking status: Former Smoker     Types: Cigarettes     Smokeless tobacco: Never Used     Tobacco comment: smoked for a week in 20's   Substance Use Topics     Alcohol use: Yes     Alcohol/week: 0.0 standard drinks     Drug use: No       Family History   I have reviewed this patient's family history and updated it with pertinent information if needed.  Family History   Problem Relation Age of Onset     C.A.D. Father      Cancer Mother         breast cancer,  of pneumonia     Cerebrovascular Disease Son      CABG Son      Family History Negative Child      Family History Negative Sister      Heart Disease Brother        Prior to Admission Medications   Prior to Admission Medications   Prescriptions Last Dose Informant Patient Reported? Taking?   albuterol (PROAIR HFA/PROVENTIL HFA/VENTOLIN HFA) 108 (90 Base) MCG/ACT inhaler Past Week at  Unknown time Self Yes Yes   Sig: Inhale 2 puffs into the lungs 4 times daily as needed for shortness of breath / dyspnea or wheezing   aspirin (ASA) 81 MG EC tablet 9/28/2020 at 0800 Self Yes Yes   Sig: Take 81 mg by mouth daily   budesonide (PULMICORT) 0.5 MG/2ML nebulizer solution 9/28/2020 at 0800 Self No Yes   Sig: Take 2 mLs (0.5 mg) by nebulization 2 times daily   calcium carbonate 600 mg-vitamin D 400 units (CALTRATE) 600-400 MG-UNIT per tablet 9/28/2020 at 0800 Self Yes Yes   Sig: Take 1 tablet by mouth daily   dabigatran ANTICOAGULANT (PRADAXA) 150 MG capsule 9/28/2020 at 0800 Self No Yes   Sig: Take 1 capsule (150 mg) by mouth 2 times daily Store in original 's bottle or blister pack; use within 120 days of opening.   ferrous sulfate (IRON) 325 (65 Fe) MG tablet 9/28/2020 at 0800 Self No Yes   Sig: TAKE 1 TABLET(325 MG) BY MOUTH TWICE DAILY   furosemide (LASIX) 20 MG tablet 9/28/2020 at Unknown time  No Yes   Sig: Take 1 tablet (20 mg) by mouth daily   ipratropium - albuterol 0.5 mg/2.5 mg/3 mL (DUONEB) 0.5-2.5 (3) MG/3ML nebulization 9/28/2020 at Unknown time Self No Yes   Sig: Inhale 1 vial (3 mLs) into the lungs 4 times daily   lisinopril (ZESTRIL) 20 MG tablet 9/28/2020 at 0800  No Yes   Sig: Take 1 tablet (20 mg) by mouth daily   meclizine (ANTIVERT) 12.5 MG tablet Past Week at Unknown time  No Yes   Sig: Take 1 tablet (12.5 mg) by mouth 3 times daily as needed for dizziness   melatonin 3 MG tablet Past Week at Unknown time  No Yes   Sig: Take 1 tablet (3 mg) by mouth nightly as needed for sleep   metoprolol tartrate (LOPRESSOR) 25 MG tablet 9/28/2020 at 0800  No Yes   Sig: Take 1 tablet (25 mg) by mouth 2 times daily   montelukast (SINGULAIR) 10 MG tablet 9/28/2020 at 0800 Self No Yes   Sig: TAKE 1 TABLET(10 MG) BY MOUTH DAILY   multivitamin, therapeutic (THERA-VIT) TABS tablet 9/28/2020 at 0800 Self Yes Yes   Sig: Take 1 tablet by mouth every evening    nitroGLYcerin (NITROSTAT) 0.4 MG  sublingual tablet   No No   Sig: For chest pain place 1 tablet under the tongue every 5 minutes for 3 doses. If symptoms persist 5 minutes after 1st dose call 911.   pantoprazole (PROTONIX) 40 MG EC tablet 9/28/2020 at 0800  No Yes   Sig: TAKE ONE TABLET BY MOUTH TWICE A DAY BEFORE MEALS   potassium chloride ER (KLOR-CON M) 10 MEQ CR tablet 9/28/2020 at 0800  No Yes   Sig: Take 1 tablet (10 mEq) by mouth daily   simvastatin (ZOCOR) 40 MG tablet 9/27/2020 at 2000 Self No Yes   Sig: TAKE 1 TABLET(40 MG) BY MOUTH AT BEDTIME      Facility-Administered Medications: None     Allergies   Allergies   Allergen Reactions     No Known Drug Allergies        Physical Exam   Vital Signs: Temp: 99.9  F (37.7  C) Temp src: Temporal BP: 110/68 Pulse: 58   Resp: 13 SpO2: 96 % O2 Device: None (Room air)    Weight: 149 lbs 0 oz    General Appearance: Thin elderly male no acute distress  Eyes: Pupils equal, round reactive to light, sclera anicteric  HEENT: Mucous membranes moist, no neck lymphadenopathy  Respiratory: Right side somewhat coarse with scattered crackles and few mild wheezes, lungs otherwise clear, no tachypnea  Cardiovascular: Regular rate rhythm, normal S1/S2 with grade 1/6 systolic murmur  GI: Abdomen soft, nontender, nondistended, normal bowel sounds  Lymph/Hematologic: 2+ pitting edema to knees bilaterally  Skin: No rash or bruising  Musculoskeletal: Extremities warm well perfused  Neurologic: Alert and appropriate, cranial nerves grossly intact  Psychiatric: Normal affect    Data   Data reviewed today: I reviewed all medications, new labs and imaging results over the last 24 hours. I personally reviewed no images or EKG's today.    Recent Labs   Lab 09/28/20 2029   WBC 6.9   HGB 9.1*   MCV 66*   *      POTASSIUM 5.4*   CHLORIDE 109   CO2 25   BUN 80*   CR 1.93*   ANIONGAP 4   AYALA 9.7   *   ALBUMIN 3.2*   PROTTOTAL 7.0   BILITOTAL 0.6   ALKPHOS 64   ALT 19   AST 16     Recent Results (from the past  24 hour(s))   CT Chest Abdomen Pelvis w/o Contrast    Narrative    CT CHEST ABDOMEN PELVIS WITHOUT CONTRAST 9/28/2020 9:18 PM    CLINICAL HISTORY: Sepsis. Chills/fever, shortness of breath. Right  lower quadrant abdominal pain, vomiting. Evaluate appendix, stone.  Pneumonia.    TECHNIQUE: CT scan of the chest, abdomen, and pelvis was performed  without IV contrast. Multiplanar reformats were obtained. Dose  reduction techniques were used.   CONTRAST: None.    COMPARISON: CT chest abdomen and pelvis 9/2/2020.    FINDINGS:   LUNGS AND PLEURA: Trace right pleural fluid. New extensive dense  irregular consolidation occupying the right upper lobe. There is also  small new nodular areas of consolidation noted at the right middle  lobe, right lower lobe as well. There are additional multiple  bilateral pulmonary nodules again noted as on the prior exam. These  multiple nodules do not show significant short interval change. An  example of one of these is at the periphery of the right lower lobe  continuing to measure 0.6 cm series 6 image 175.    MEDIASTINUM/AXILLAE: Thoracic aortic and diffuse coronary artery  calcifications. Stable mild cardiomegaly. Stable mediastinal lymph  nodes. Borderline-prominent stable subcarinal lymph node measuring 1.1  cm series 4 image 62. Left chest pacemaker. Aortic root postoperative  change.    HEPATOBILIARY: Cholecystectomy. No acute hepatic abnormality.    PANCREAS: Normal.    SPLEEN: Normal.    ADRENAL GLANDS: Stable minimal thickening.    KIDNEYS/BLADDER: Stable multiple bilateral hyperdense renal lesions,  many of which are exophytic. No hydronephrosis. Unremarkable bladder.    BOWEL: No obstruction or acute inflammation. No free fluid, free air,  or abscess. No evidence to suggest appendicitis.    LYMPH NODES: Normal.    VASCULATURE: Diffuse calcifications.    PELVIC ORGANS: No acute abnormality. Prominent prostate is 5.1 cm.    OTHER: Stable focal locule of fluid at the left  inguinal region that  is 2.9 cm series 4 image 266.    MUSCULOSKELETAL: Stable T12 compression fracture deformity. No  aggressive bony lesion otherwise identified.      Impression    IMPRESSION:  1.  New dense consolidation at the right lung, dominantly seen at the  right upper lobe consistent with lobar pneumonia. There is also  involvement of the right middle lobe and right lower lobe.  2.  Numerous bilateral pulmonary nodules appear grossly stable.  3.  Stable mildly prominent mediastinal lymph node.  4.  Stable multiple hyperdense renal lesions. These could be  hemorrhagic or proteinaceous cysts. Solid renal lesion is not  excluded.  5.  Diffuse vascular calcifications.    JERAMIE DELGADILLO MD   Lumbar spine CT w/o contrast    Narrative    CT LUMBAR SPINE WITHOUT CONTRAST  9/28/2020 9:18 PM     HISTORY: Back pain, chills.     TECHNIQUE: Axial images of the lumbar spine were obtained without  intravenous contrast. Multiplanar reformations were performed.   Radiation dose for this scan was reduced using automated exposure  control, adjustment of the mA and/or kV according to patient size, or  iterative reconstruction technique.     COMPARISON: None. Correlation is made with CT chest, abdomen and  pelvis of same date.    FINDINGS:  Five lumbar-type vertebral bodies are present. There is a  recent-appearing fracture involving the superior endplate of L2 with  mild associated height loss. Chronic moderate to severe compression  fracture involving the superior and inferior endplates of T12. Mild to  moderate superior endplate concavity involving the L3 vertebral body.  Minimal concavity involving the posterior aspect of the L5 inferior  endplate appears chronic.    Alignment of the lumbar spine is within normal limits. The  intervertebral discs are relatively maintained in height. No definite  high-grade spinal canal stenosis. There is mild left L3-L4 neural  foraminal narrowing. Otherwise, no significant neural  foraminal  stenosis. There are multiple bilateral renal masses, many of which are  hyperdense as compared to the renal parenchyma. These are better  characterized on CT of the chest, abdomen and pelvis of same date.  Diffuse atherosclerotic disease of the abdominal aorta and its major  branches, with ectasia of the infrarenal abdominal aorta measuring up  to 2.7-2.8 cm in the axial plane. Bilateral sacroiliac joint  degenerative changes.      Impression    IMPRESSION:  1. Acute-to-subacute appearing mild superior endplate compression  fracture of L2.  2. Chronic-appearing compression/contour deformities involving T12,  L3, and L5.  3. No high-grade spinal canal or neural foraminal stenosis.  4. Infrarenal abdominal aortic ectasia measuring up to 2.7-2.8 cm in  diameter in the axial plane.  5. Multiple renal masses, better characterized on CT of the chest,  abdomen and pelvis of same date. Please see that separate report for  additional details    SRIKANTH DO MD

## 2020-09-30 ENCOUNTER — APPOINTMENT (OUTPATIENT)
Dept: OCCUPATIONAL THERAPY | Facility: CLINIC | Age: 85
DRG: 542 | End: 2020-09-30
Attending: HOSPITALIST
Payer: COMMERCIAL

## 2020-09-30 ENCOUNTER — APPOINTMENT (OUTPATIENT)
Dept: PHYSICAL THERAPY | Facility: CLINIC | Age: 85
DRG: 542 | End: 2020-09-30
Attending: HOSPITALIST
Payer: COMMERCIAL

## 2020-09-30 LAB
ANION GAP SERPL CALCULATED.3IONS-SCNC: 3 MMOL/L (ref 3–14)
BUN SERPL-MCNC: 54 MG/DL (ref 7–30)
CALCIUM SERPL-MCNC: 9.3 MG/DL (ref 8.5–10.1)
CHLORIDE SERPL-SCNC: 106 MMOL/L (ref 94–109)
CO2 SERPL-SCNC: 26 MMOL/L (ref 20–32)
CREAT SERPL-MCNC: 1.49 MG/DL (ref 0.66–1.25)
DEPRECATED CALCIDIOL+CALCIFEROL SERPL-MC: 29 UG/L (ref 20–75)
ERYTHROCYTE [DISTWIDTH] IN BLOOD BY AUTOMATED COUNT: 17.4 % (ref 10–15)
FERRITIN SERPL-MCNC: 801 NG/ML (ref 26–388)
GFR SERPL CREATININE-BSD FRML MDRD: 42 ML/MIN/{1.73_M2}
GLUCOSE SERPL-MCNC: 120 MG/DL (ref 70–99)
HCT VFR BLD AUTO: 26.7 % (ref 40–53)
HGB BLD-MCNC: 8.2 G/DL (ref 13.3–17.7)
IRON SATN MFR SERPL: 11 % (ref 15–46)
IRON SERPL-MCNC: 27 UG/DL (ref 35–180)
MCH RBC QN AUTO: 20.4 PG (ref 26.5–33)
MCHC RBC AUTO-ENTMCNC: 30.7 G/DL (ref 31.5–36.5)
MCV RBC AUTO: 66 FL (ref 78–100)
PLATELET # BLD AUTO: 122 10E9/L (ref 150–450)
POTASSIUM SERPL-SCNC: 4.7 MMOL/L (ref 3.4–5.3)
PTH-INTACT SERPL-MCNC: 18 PG/ML (ref 12–64)
RBC # BLD AUTO: 4.02 10E12/L (ref 4.4–5.9)
SARS-COV-2 RNA SPEC QL NAA+PROBE: NOT DETECTED
SODIUM SERPL-SCNC: 135 MMOL/L (ref 133–144)
SPECIMEN SOURCE: NORMAL
TIBC SERPL-MCNC: 245 UG/DL (ref 240–430)
WBC # BLD AUTO: 4.6 10E9/L (ref 4–11)

## 2020-09-30 PROCEDURE — 83540 ASSAY OF IRON: CPT | Performed by: STUDENT IN AN ORGANIZED HEALTH CARE EDUCATION/TRAINING PROGRAM

## 2020-09-30 PROCEDURE — 25000132 ZZH RX MED GY IP 250 OP 250 PS 637: Performed by: HOSPITALIST

## 2020-09-30 PROCEDURE — 36415 COLL VENOUS BLD VENIPUNCTURE: CPT | Performed by: STUDENT IN AN ORGANIZED HEALTH CARE EDUCATION/TRAINING PROGRAM

## 2020-09-30 PROCEDURE — 80048 BASIC METABOLIC PNL TOTAL CA: CPT | Performed by: STUDENT IN AN ORGANIZED HEALTH CARE EDUCATION/TRAINING PROGRAM

## 2020-09-30 PROCEDURE — 97530 THERAPEUTIC ACTIVITIES: CPT | Mod: GP

## 2020-09-30 PROCEDURE — 83970 ASSAY OF PARATHORMONE: CPT | Performed by: STUDENT IN AN ORGANIZED HEALTH CARE EDUCATION/TRAINING PROGRAM

## 2020-09-30 PROCEDURE — 25000132 ZZH RX MED GY IP 250 OP 250 PS 637: Performed by: STUDENT IN AN ORGANIZED HEALTH CARE EDUCATION/TRAINING PROGRAM

## 2020-09-30 PROCEDURE — 82728 ASSAY OF FERRITIN: CPT | Performed by: STUDENT IN AN ORGANIZED HEALTH CARE EDUCATION/TRAINING PROGRAM

## 2020-09-30 PROCEDURE — 83550 IRON BINDING TEST: CPT | Performed by: STUDENT IN AN ORGANIZED HEALTH CARE EDUCATION/TRAINING PROGRAM

## 2020-09-30 PROCEDURE — 85027 COMPLETE CBC AUTOMATED: CPT | Performed by: STUDENT IN AN ORGANIZED HEALTH CARE EDUCATION/TRAINING PROGRAM

## 2020-09-30 PROCEDURE — 97161 PT EVAL LOW COMPLEX 20 MIN: CPT | Mod: GP

## 2020-09-30 PROCEDURE — 97165 OT EVAL LOW COMPLEX 30 MIN: CPT | Mod: GO

## 2020-09-30 PROCEDURE — 12000000 ZZH R&B MED SURG/OB

## 2020-09-30 PROCEDURE — 97530 THERAPEUTIC ACTIVITIES: CPT | Mod: GO

## 2020-09-30 PROCEDURE — 99232 SBSQ HOSP IP/OBS MODERATE 35: CPT | Performed by: HOSPITALIST

## 2020-09-30 PROCEDURE — 25000128 H RX IP 250 OP 636: Performed by: HOSPITALIST

## 2020-09-30 RX ORDER — DABIGATRAN ETEXILATE 150 MG/1
150 CAPSULE ORAL 2 TIMES DAILY
Status: DISCONTINUED | OUTPATIENT
Start: 2020-09-30 | End: 2020-10-01 | Stop reason: HOSPADM

## 2020-09-30 RX ADMIN — Medication 2.5 MG: at 01:48

## 2020-09-30 RX ADMIN — IPRATROPIUM BROMIDE AND ALBUTEROL 1 PUFF: 20; 100 SPRAY, METERED RESPIRATORY (INHALATION) at 08:27

## 2020-09-30 RX ADMIN — IPRATROPIUM BROMIDE AND ALBUTEROL 1 PUFF: 20; 100 SPRAY, METERED RESPIRATORY (INHALATION) at 13:38

## 2020-09-30 RX ADMIN — ACETAMINOPHEN 975 MG: 325 TABLET, FILM COATED ORAL at 08:24

## 2020-09-30 RX ADMIN — ASPIRIN 81 MG: 81 TABLET, COATED ORAL at 08:25

## 2020-09-30 RX ADMIN — PANTOPRAZOLE SODIUM 40 MG: 40 TABLET, DELAYED RELEASE ORAL at 17:35

## 2020-09-30 RX ADMIN — DOXYCYCLINE 100 MG: 100 CAPSULE ORAL at 20:34

## 2020-09-30 RX ADMIN — PANTOPRAZOLE SODIUM 40 MG: 40 TABLET, DELAYED RELEASE ORAL at 06:16

## 2020-09-30 RX ADMIN — ACETAMINOPHEN 975 MG: 325 TABLET, FILM COATED ORAL at 20:35

## 2020-09-30 RX ADMIN — LIDOCAINE 1 PATCH: 560 PATCH PERCUTANEOUS; TOPICAL; TRANSDERMAL at 08:26

## 2020-09-30 RX ADMIN — ACETAMINOPHEN 975 MG: 325 TABLET, FILM COATED ORAL at 13:37

## 2020-09-30 RX ADMIN — Medication 5 MG: at 08:24

## 2020-09-30 RX ADMIN — MONTELUKAST 10 MG: 10 TABLET, FILM COATED ORAL at 08:25

## 2020-09-30 RX ADMIN — DOXYCYCLINE 100 MG: 100 CAPSULE ORAL at 08:25

## 2020-09-30 RX ADMIN — SIMVASTATIN 40 MG: 40 TABLET, FILM COATED ORAL at 22:37

## 2020-09-30 RX ADMIN — METOPROLOL TARTRATE 25 MG: 25 TABLET, FILM COATED ORAL at 20:34

## 2020-09-30 RX ADMIN — CEFTRIAXONE SODIUM 2 G: 2 INJECTION, POWDER, FOR SOLUTION INTRAMUSCULAR; INTRAVENOUS at 20:34

## 2020-09-30 RX ADMIN — IPRATROPIUM BROMIDE AND ALBUTEROL 1 PUFF: 20; 100 SPRAY, METERED RESPIRATORY (INHALATION) at 17:35

## 2020-09-30 RX ADMIN — Medication 1 MG: at 22:37

## 2020-09-30 RX ADMIN — FLUTICASONE FUROATE 1 PUFF: 100 POWDER RESPIRATORY (INHALATION) at 08:27

## 2020-09-30 RX ADMIN — DABIGATRAN ETEXILATE MESYLATE 150 MG: 150 CAPSULE ORAL at 20:35

## 2020-09-30 RX ADMIN — IPRATROPIUM BROMIDE AND ALBUTEROL 1 PUFF: 20; 100 SPRAY, METERED RESPIRATORY (INHALATION) at 20:51

## 2020-09-30 ASSESSMENT — ACTIVITIES OF DAILY LIVING (ADL)
ADLS_ACUITY_SCORE: 14
ADLS_ACUITY_SCORE: 16
ADLS_ACUITY_SCORE: 14
ADLS_ACUITY_SCORE: 16

## 2020-09-30 ASSESSMENT — MIFFLIN-ST. JEOR: SCORE: 1346.25

## 2020-09-30 NOTE — PLAN OF CARE
[id number]: Covid test pending. Patient alert and oriented, deaf, vision loss in L eye. Writes on paper for communication overnight. Up SBA with walker. Voiding adequately. Complains of lower back pain, prn oxycodone given x1 and heat applied. PIV SL. Plan for PT/OT and nephrology consult.

## 2020-09-30 NOTE — PROGRESS NOTES
09/30/20 1504   Quick Adds   Type of Visit Initial PT Evaluation   Living Environment   Lives With child(perla), adult   Living Arrangements house   Home Accessibility stairs to enter home;stairs within home   Transportation Anticipated family or friend will provide   Living Environment Comment 3-5 BLADIMIR with 2 rails. 1 flight to laundry room.    Self-Care   Usual Activity Tolerance good   Current Activity Tolerance fair   Equipment Currently Used at Home grab bar, tub/shower;cane, straight   Activity/Exercise/Self-Care Comment Patient has cane that was his wife's, he was usually the last few weeks due to pain in lower back.    Functional Level Prior   Ambulation 0-->independent   Transferring 0-->independent   Toileting 0-->independent   Bathing 0-->independent   Communication 0-->understands/communicates without difficulty   Swallowing 0-->swallows foods/liquids without difficulty   Cognition 0 - no cognition issues reported   Fall history within last six months no   Which of the above functional risks had a recent onset or change? ambulation   Prior Functional Level Comment Patient prior, working and volunteering still. Patient enjoys walking his dog as well - usually takes his cane (mostly for LBP).    General Information   Onset of Illness/Injury or Date of Surgery - Date 09/28/20   Referring Physician Sammy So MD    Patient/Family Goals Statement To get better and walk his dog.    Pertinent History of Current Problem (include personal factors and/or comorbidities that impact the POC) Pt admit with pneumonia, RD, (sub)acute L2 compression fx, Afib with pacer, CAD post CABG & TAVR, asthma, CKD3, renal cysts, chronic anemia, HTN, HLD.   Precautions/Limitations spinal precautions  (O2 drops with activity)   Weight-Bearing Status - LUE full weight-bearing  (all)   Cognitive Status Examination   Level of Consciousness alert   Follows Commands and Answers Questions 100% of the time   Personal Safety and  "Judgment intact   Pain Assessment   Patient Currently in Pain   (None noted during PT, general LBP discomfort per pt pre-PT)   Posture    Posture Comments [facility]   Range of Motion (ROM)   ROM Comment [facility]   Bed Mobility   Bed Mobility Comments Independent - strong & steady, discussed logrolling.    Transfer Skills   Transfer Comments Independent - strong & steady.    Gait   Gait Comments ' + 200' - strong & steady but O2 84% on room air.  1 flight of stairs O2 87% mild SOB recip with 1 rail.    Balance   Balance Comments Indepenent, low falls risk - steady on stairs iwth rail reicproclaly & with head & body turns.    Coordination   Coordination Comments WFL   Muscle Tone   Muscle Tone Comments [facility]   General Therapy Interventions   Planned Therapy Interventions strengthening;risk factor education;home program guidelines;progressive activity/exercise;gait training   Clinical Impression   Criteria for Skilled Therapeutic Intervention yes, treatment indicated   PT Diagnosis Impaired activity tolerance   Influenced by the following impairments Impaired cardiopulm activity tolerance   Functional limitations due to impairments Impaired activity tolerance   Clinical Presentation Stable/Uncomplicated   Clinical Decision Making (Complexity) Low complexity   Therapy Frequency 5x/week   Predicted Duration of Therapy Intervention (days/wks) 3 days   Anticipated Equipment Needs at Discharge   (cane outdoors PRN)   Anticipated Discharge Disposition Home with Assist;Home with Outpatient Therapy   Risk & Benefits of therapy have been explained Yes   Patient, Family & other staff in agreement with plan of care Yes   [facility] [employer] TM \"6 Clicks\"   2016, Trustees of [facility], under license to [employer].  All rights reserved.   6 Clicks Short Forms Basic Mobility Inpatient Short Form   [facility] AM-PAC  \"6 Clicks\" V.2 Basic Mobility Inpatient Short Form   1. Turning from your back to your side while in a " flat bed without using bedrails? 4 - None   2. Moving from lying on your back to sitting on the side of a flat bed without using bedrails? 4 - None   3. Moving to and from a bed to a chair (including a wheelchair)? 4 - None   4. Standing up from a chair using your arms (e.g., wheelchair, or bedside chair)? 4 - None   5. To walk in hospital room? 3 - A Little   6. Climbing 3-5 steps with a railing? 3 - A Little   Basic Mobility Raw Score (Score out of 24.Lower scores equate to lower levels of function) 22   Total Evaluation Time   Total Evaluation Time (Minutes) 20

## 2020-09-30 NOTE — PROGRESS NOTES
Discharge Planner PT   Pt admit with pneumonia, RD, (sub)acute L2 compression fx, Afib with pacer, CAD post CABG & TAVR, asthma, CKD3, renal cysts, chronic anemia, HTN, HLD.  Patient plan for discharge: Home  Current status: Independent bed mobility & transfers, 200' + 200' walks no assistive device & 1 flight of stairs with 1 rail - steady but O2 to 84% on room air with 5-10sec recovery with seated rest break, mild SOB per pt's usual - education on pacing with breaks and breathing focus when SOB given desaturation yet.   Barriers to return to prior living situation: none  Recommendations for discharge: home with sons - having sons do laundry for a week to focus on endurance gains first, plan for ongoing cane use outside. OP PT for activity tolerance progressions.  Rationale for recommendations: Impaired activity tolerance, core stabilization training indicated post left compression fx.       Entered by: Brittany Dressler 09/30/2020 3:10 PM

## 2020-09-30 NOTE — PROGRESS NOTES
09/30/20 1406   Quick Adds   Type of Visit Initial Occupational Therapy Evaluation   Living Environment   Lives With child(perla), adult  (two sons )   Living Arrangements house   Home Accessibility stairs to enter home   Number of Stairs, Main Entrance   (5 BLADIMIR)   Transportation Anticipated family or friend will provide   Living Environment Comment Patient lives in house, 5 BLADIMIR with railing and able to stay on main level. Patient's two sons live in basement. They manage laundry so patient does not need to go in basement.    Self-Care   Usual Activity Tolerance good   Current Activity Tolerance moderate   Equipment Currently Used at Home grab bar, tub/shower   Activity/Exercise/Self-Care Comment Patient has cane that was his wife's, he was usually the last few weeks due to pain in lower back.    Functional Level   Ambulation 0-->independent   Transferring 0-->independent   Toileting 0-->independent   Bathing 0-->independent   Dressing 0-->independent   Eating 0-->independent   Communication 0-->understands/communicates without difficulty   Swallowing 0-->swallows foods/liquids without difficulty   Cognition 0 - no cognition issues reported   Fall history within last six months no   Which of the above functional risks had a recent onset or change? ambulation;transferring;dressing   Prior Functional Level Comment Patient prior, working and volunteering still. Patient enjoys walking his dog as well.    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   Sensory Examination   Sensory Quick Adds No deficits were identified   Pain Assessment   Patient Currently in Pain   (2/10 back pain )   Integumentary/Edema   Integumentary/Edema no deficits were identifed   Posture   Posture not impaired   Range of Motion (ROM)   ROM Quick Adds No deficits were identified   Strength   Manual Muscle Testing Quick Adds No deficits were identified   Coordination   Upper Extremity Coordination No deficits were identified   Transfer Skill: Sit to Stand   Level of [place]: Sit/Stand stand-by assist   Physical Assist/Nonphysical Assist: Sit/Stand set-up required;supervision   Transfer Skill: Toilet Transfer   Level of [place]: Toilet stand-by assist   Physical Assist/Nonphysical Assist: Toilet set-up required;supervision   Lower Body Dressing   Level of [place]: Dress Lower Body stand-by assist   Physical Assist/Nonphysical Assist: Dress Lower Body set-up required;supervision   Instrumental Activities of Daily Living (IADL)   Previous Responsibilities driving;work;medication management   Activities of Daily Living Analysis   Impairments Contributing to Impaired Activities of Daily Living balance impaired;flexibility decreased;strength decreased   General Therapy Interventions   Planned Therapy Interventions ADL retraining;IADL retraining;strengthening;progressive activity/exercise;home program guidelines   Clinical Impression   Criteria for Skilled Therapeutic Interventions Met yes, treatment indicated   OT Diagnosis decreased independence in ADLs/IADLs   Influenced by the following impairments pain, deconditioning, decreased flexibility    Assessment of Occupational Performance 1-3 Performance Deficits   Identified Performance Deficits dressing, bathing,  "home management    Clinical Decision Making (Complexity) Low complexity   Therapy Frequency 5x/week   Predicted Duration of Therapy Intervention (days/wks) 5 days    Anticipated Discharge Disposition Home with Assist   Risks and Benefits of Treatment have been explained. Yes   Patient, Family & other staff in agreement with plan of care Yes   [facility] TM \"6 Clicks\"   2016, Trustees of [facility], under license to [employer].  All rights reserved.   6 Clicks Short Forms Daily Activity Inpatient Short Form   [facility]  \"6 Clicks\" Daily Activity Inpatient Short Form   1. Putting on and taking off regular lower body clothing? 3 - A Little   2. Bathing (including washing, rinsing, drying)? 3 - A Little   3. Toileting, which includes using toilet, bedpan or urinal? 3 - A Little   4. Putting on and taking off regular upper body clothing? 4 - None   5. Taking care of personal grooming such as brushing teeth? 4 - None   6. Eating meals? 4 - None   Daily Activity Raw Score (Score out of 24.Lower scores equate to lower levels of function) 21   Total Evaluation Time   Total Evaluation Time (Minutes) 10     "

## 2020-09-30 NOTE — CONSULTS
Consult Date:  09/28/2020      IDENTIFICATION:  An 85-year-old male admitted through the Emergency Room, dated 09/28/2020 in the setting of back pain.      REASON FOR CONSULTATION:  Evaluation and assessment with respect to apparent acute on chronic renal insufficiency.      IMPRESSION:   1.  Baseline chronic kidney disease, stage III.  Baseline creatinine in the range of 1.5 mg/dL with an estimated GFR of roughly 40-45 mL per minute.   2.  Modest dipstick albuminuria with only slight elevation in albumin to creatinine ratios by review.   3.  Multiple renal masses, which by serial CT scans, appear stable by review.   4.  Acute kidney injury with presenting creatinine of 1.93.  Today's value pending.  He received 500 mL of saline in the Emergency Room.  No ongoing IV maintenance fluid at this time.  Ace inhibitor and diuretic have been held.   5.  Hyperkalemia, multifactorial including acute kidney injury, Ace inhibitor, potential K supplementation.   6.  Anemia.   7.  Acute L2 compression fracture.   8.  Community-acquired pneumonia, COVID-19 negative.   9.  Hypertension.      DISCUSSION:  Elderly male with a number of comorbid medical problems who has acute on chronic renal insufficiency in the setting of perhaps poor intake, use of diuretic and Ace inhibitor.  He received 500 mL of IV fluid in the Emergency Room, and we are currently awaiting the results of today's laboratory studies prior to ordering additional fluid.  He does not demonstrate metabolic acidosis.  He has an elevated BUN to creatinine ratio.      RECOMMENDATIONS:   1.  Continue to hold diuretic.   2.  No K supplementation.   3.  Continue to hold Ace inhibitor.   4.  Consider cautious IV fluid replacement pending results of today's labs.   5.  Document iron studies.   6.  Mineral bone disease evaluation.   7.  Ongoing management of L2 fracture.   8.  Treatment for community-acquired pneumonia.      HISTORY:  An 85-year-old male admitted 09/28 after  presenting to the Emergency Room with back pain.  He was also seen for similar complaints on 09/17, at which time he was treated with Valium and Medrol.  There is no suggestion that he was receiving nonsteroidal anti-inflammatory medications.      He is awake, alert, and appropriate.  He does not hear well, but he does read lips.  He was able to communicate by my standing at a distance and him reading lips.  An  was previously present but had to leave at this time.      He denies headache or chest pain.  His breathing is okay.  He acknowledges chronic lower extremity edema.      No ibuprofen.      PAST MEDICAL HISTORY:   1.  Anemia.   2.  AS.   3.  Colon cancer.   4.  Chronic kidney disease, stage III.   5.  Coronary artery disease.   6.  History of congestive heart failure.   7.  Atrial fibrillation.   8.  Osteoarthritis.      ALLERGIES:  NONE.      ADMISSION MEDICATIONS:  Albuterol, aspirin, Pulmicort, calcium carbonate, Pradaxa, iron, Lasix, lisinopril, meclizine, melatonin, metoprolol, Singulair, nitroglycerin, pantoprazole, potassium, simvastatin.        SOCIAL HISTORY AND FAMILY HISTORY:  Reviewed in the medical record.  He does have a primary care physician.  He is a current and he is a former smoker.  He uses limited but some alcohol.      REVIEW OF SYSTEMS:  Complete 10-point review of systems undertaken.  Pertinent positives and negatives are as I noted above.      PHYSICAL EXAMINATION:   VITAL SIGNS:  Afebrile; rhythm regular, rate 70; blood pressure 130/50; respiratory rate 18; sats on room air are 96%.  His weight today is 68.7 kilograms.  Review of his I's and O's subsequent to admission demonstrate him to be nonoliguric with urine output yesterday 1380.   GENERAL:  Elderly male in no acute distress, appropriate and interactive.   HEENT:  Without lesion.   NECK:  Supple.   CHEST:  Symmetric and clear.   CARDIOVASCULAR:  Appears regular.   PULMONARY:  Symmetric and clear.   ABDOMEN:  Soft.    EXTREMITIES:  Bilateral lower extremity trace to 1+ edema.   NEUROLOGIC:  Grossly nonfocal.  He is unable to hear.   PSYCHIATRIC:  Pleasant.      LABORATORY DATA:  Current and historic reviewed in detail.         JC HAJI MD             D: 2020   T: 2020   MT:       Name:     SRIKANTH OBANDO   MRN:      [id number]        Account:       [id number]   :      1934           Consult Date:  2020      Document: [id number]

## 2020-09-30 NOTE — PROGRESS NOTES
A&O. RA. Deaf, writing on paper while  is not available, one scheduled for tomorrow morning. Ambulating with SBA x3 since 1600. Tolerating diet. Voiding

## 2020-09-30 NOTE — PROGRESS NOTES
COVID-19 results pending. Deaf.  utilized till 1700 hrs. Pt writes on a piece of paper to communicate his needs when an   is not available. Approach the pt from his R side as the pt cannot see from his L side. A/OX4. Soft BP, metoprolol held, other VSS on RA. BLE 1+ edema. Back pain managed with corby Tylenol and Lido patch. Criselda cardiac diet well. SBA+ walker. Plan for pain management, PT/OT/nutrition and Nephrology consult.

## 2020-09-30 NOTE — PROGRESS NOTES
Pt is transferring to station 88 at this time. COVID-19 results pending. Deaf.  is here till 1600hrs. Pt writes on a piece of paper to communicate his needs when an   is not available. Approach the pt from his R side as the pt cannot see from his L side. A/OX4. Soft BP, metoprolol held, other VSS on RA. BLE 1+ edema. Back pain managed with oxycone, Tylenol and Lido patch. Criselda cardiac/renal diet well. SBA+ walker. Plan for pain management, PT/OT/nutrition and Nephrology consult.Urine is yet to be collected.

## 2020-09-30 NOTE — PLAN OF CARE
Initial OT evaluation completed and treatment initiated. Patient 85 year old deaf male admitted on 9/28/2020.  Past history of A-fib, HTN, CAD s/p CABG, aortic stenosis s/p TAVR, asthma, CKD who presents with low back pain due to compression fracture but was also found to have pneumonia and RD. Patient lives in house, 5 [address] with railing and able to stay on main level. Patient's two sons live in basement. They manage laundry so patient does not need to go in basement. Patient prior, working and volunteering still. Patient enjoys walking his dog as well.     Discharge Planner OT   Patient plan for discharge: home    Current status: Patient transferred supine-sit EOB modified independent. sit-stand SBA. Patient ambulated to bathroom SBA with FWW. toilet transfer modified independent using grab bar. sit-stand SBA. Patient ambulated in room and into hallway ~ 100 feet moderate pace with FWW. Patient returned to room and transferred to EOB SBA. Therapist educated pt on LB dressing techniques to reduce back pain with pt verbalizing understanding. sit-supine SBA.    Barriers to return to prior living situation: deconditioning, pain, decreased balance    Recommendations for discharge: home- assist from son's prn for IADLs for home management    Rationale for recommendations: Anticipate patient to progress during hospitalization with therapy to discharge home. Patient states sons work during the day, however can and will assist with home management.        Entered by: Kelly Grullon 09/30/2020 2:39 PM

## 2020-09-30 NOTE — CONSULTS
Care Coordination:    Care Transition Initial Assessment - RN        Met with: Patient. With   DATA   Active Problems:    Community acquired pneumonia       Cognitive Status: awake, alert and oriented.  Primary Care Clinic Name: [facility]  Primary Care MD Name: Jennifer  Contact information and PCP information verified: Yes  Lives With: child(perla), adult   Living Arrangements: house                 Insurance concerns: No Insurance issues identified  ASSESSMENT  Patient currently receives the following services:  Pt lives with sons.  Current no services. Has equipment in the home (from wife's surgeries)        Identified issues/concerns regarding health management: Pt admitted with back pain. ARF.  Seen by nephrology and therapies.  Therapies recommend home w/ son.  Met with patient and explained role. Pt voices no needs for discharge.  Feels he has adequate help at home. Sons stay with him.  Pt drives.     PLAN  Financial costs for the patient include TBD .  Patient given options and choices for discharge yes .  Patient/family is agreeable to the plan?  Yes:   Patient anticipates discharging to home .        Patient anticipates needs for home equipment: No  Transportation/person available to transport on day of discharge  is Sons and have they been notified/set up TBD- after 4:30 as they work.   Plan/Disposition: Home   Appointments: TBD-- Pt would like CC to make follow up appointments.     Any time of day is fine. Prefers in person vs virtual.   Care  (CTS) will continue to follow as needed.        Keeley Cross RN BSN  Inpatient Care Coordination  [facility]  [phone]

## 2020-09-30 NOTE — PROGRESS NOTES
[facility]  Hospitalist Progress Note        Isaac Infante, DO  09/30/2020        Interval History:      Patient states he is doing well, still with pain in his back. Await nephrology and covid testing. Discussed with patient and nursing staff with assistance from sign language interpretor.          Assessment and Plan:        Shiraz Ingram is a 85 year old deaf male admitted on 9/28/2020.  Past history of A-fib, HTN, CAD s/p CABG, aortic stenosis s/p TAVR, asthma, CKD who presents with low back pain due to compression fracture but was also found to have pneumonia and RD.      Community-acquired pneumonia  Admission CT chest with right-sided infiltrates, the patient is without fever, leukocytosis or complaints of dyspnea.   - Received ceftriaxone and doxycycline in the emergency room which will be continued.     - Blood cultures pending.   - Symptomatic COVID swab is pending     Acute to subacute L2 compression fracture  Back pain was his primary complaint for presentation with CT of the lumbar spine demonstrating fracture as noted.    - We will continue with pain control with Tylenol 975mg 3 times daily, lidocaine patch and low-dose PRN oxycodone.    - Physical therapy and Occupational Therapy.     Possible AK I on chronic kidney disease stage III  Hyperkalemia, mild  Admission creatinine 1.93 with BUN of 80, potassium 5.4.  Most recent labs from August 2020 show creatinine of 2.08, however, prior to this creatinine was about 1.4.  Has lasix and lisinopril PTA. Denies NSAID use.   - Holding PTA lasix and lisinopril   - Repeat BMP  - Nephrology consulted     Renal cysts   Noted on CT scan. Stable since February - but noted in nephrology consult note from 5/12 that they do not appear to be simple cysts - unsure if this was every follow-ed up or discussed further. Possibly contributing to renal dysfunction?   - Nephrology consulted      Chronic anemia and thrombocytopenia  Admission hemoglobin  "9.1, platelets 144.  This is stable at baseline.  - Has been seen by cardiology for this, considering bone marrow biopsy.      A. fib status post PPM  CAD status post CABG x3  Aortic stenosis status post TAVR in 2020  Hypertension  Hyperlipidemia  Most recent TTE from 2020 shows ejection fraction of 50 to 55%, aortic valve functioning well.  Continue prior to admission metoprolol, simvastatin.    - Hold prior to admission lisinopril, Lasix and potassium supplement  - Pharmacy to manage dabigatran     Asthma  Continue prior to admission Pulmicort, Montella cast and duo nebs.     Diet: Combination Diet Low Saturated Fat Na <2400mg Diet      DVT Prophylaxis: Per Pharmacy.     Code Status: No CPR- Do NOT Intubate       Expected discharge: 1-2 days, recommended to prior living arrangement once adequate pain management/ tolerating PO medications and renal function improved.                   Physical Exam:           Blood pressure 132/56, pulse 72, temperature 97  F (36.1  C), temperature source Oral, resp. rate 18, height 1.727 m (5' 8\"), weight 68.9 kg (151 lb 14.4 oz), SpO2 97 %.    Vitals:    20 1745 20 0657   Weight: 67.6 kg (149 lb) 68.9 kg (151 lb 14.4 oz)       Vital Sign Ranges  Temperature Temp  Av.6  F (35.9  C)  Min: 96  F (35.6  C)  Max: 97.2  F (36.2  C)   Blood pressure Systolic (24hrs), Av , Min:100 , Max:137        Diastolic (24hrs), Av, Min:34, Max:56      Pulse Pulse  Av.8  Min: 58  Max: 72   Respirations Resp  Av.5  Min: 16  Max: 18   Pulse oximetry SpO2  Av.7 %  Min: 95 %  Max: 98 %     Vital Signs with Ranges  Temp:  [96  F (35.6  C)-97.2  F (36.2  C)] 97  F (36.1  C)  Pulse:  [58-72] 72  Resp:  [16-18] 18  BP: (100-137)/(34-56) 132/56  SpO2:  [95 %-98 %] 97 %    I/O Last 3 Shifts:   I/O last 3 completed shifts:  In: 980 [P.O.:980]  Out: 1625 [Urine:1625]    I/O past 24 hours:     Intake/Output Summary (Last 24 hours) at 2020 0829  Last data " filed at 9/30/2020 0520  Gross per 24 hour   Intake 980 ml   Output 1625 ml   Net -645 ml     GENERAL: Alert and oriented. NAD. Conversational, appropriate.   HEENT: Normocephalic. EOMI. No icterus or injection. Nares normal.   LUNGS: Clear to auscultation. No dyspnea at rest.   HEART: Irregular rate. Extremities perfused.   ABDOMEN: Soft, nontender, and nondistended. Positive bowel sounds.   EXTREMITIES: LE edema noted.   NEUROLOGIC: Moves extremities x4, follows commands.          Prior to Admission Medications:        Medications Prior to Admission   Medication Sig Dispense Refill Last Dose     albuterol (PROAIR HFA/PROVENTIL HFA/VENTOLIN HFA) 108 (90 Base) MCG/ACT inhaler Inhale 2 puffs into the lungs 4 times daily as needed for shortness of breath / dyspnea or wheezing   Past Week at Unknown time     aspirin (ASA) 81 MG EC tablet Take 81 mg by mouth daily   9/28/2020 at 0800     budesonide (PULMICORT) 0.5 MG/2ML nebulizer solution Take 2 mLs (0.5 mg) by nebulization 2 times daily 120 mL 0 9/28/2020 at 0800     calcium carbonate 600 mg-vitamin D 400 units (CALTRATE) 600-400 MG-UNIT per tablet Take 1 tablet by mouth daily   9/28/2020 at 0800     dabigatran ANTICOAGULANT (PRADAXA) 150 MG capsule Take 1 capsule (150 mg) by mouth 2 times daily Store in original 's bottle or blister pack; use within 120 days of opening. 180 capsule 3 9/28/2020 at 0800     ferrous sulfate (IRON) 325 (65 Fe) MG tablet TAKE 1 TABLET(325 MG) BY MOUTH TWICE DAILY 180 tablet 0 9/28/2020 at 0800     furosemide (LASIX) 20 MG tablet Take 1 tablet (20 mg) by mouth daily 90 tablet 0 9/28/2020 at Unknown time     ipratropium - albuterol 0.5 mg/2.5 mg/3 mL (DUONEB) 0.5-2.5 (3) MG/3ML nebulization Inhale 1 vial (3 mLs) into the lungs 4 times daily 360 mL 11 9/28/2020 at Unknown time     lisinopril (ZESTRIL) 20 MG tablet Take 1 tablet (20 mg) by mouth daily 90 tablet 1 9/28/2020 at 0800     meclizine (ANTIVERT) 12.5 MG tablet Take 1  tablet (12.5 mg) by mouth 3 times daily as needed for dizziness 90 tablet 1 Past Week at Unknown time     melatonin 3 MG tablet Take 1 tablet (3 mg) by mouth nightly as needed for sleep 30 tablet 0 Past Week at Unknown time     metoprolol tartrate (LOPRESSOR) 25 MG tablet Take 1 tablet (25 mg) by mouth 2 times daily 60 tablet 0 9/28/2020 at 0800     montelukast (SINGULAIR) 10 MG tablet TAKE 1 TABLET(10 MG) BY MOUTH DAILY 90 tablet 1 9/28/2020 at 0800     multivitamin, therapeutic (THERA-VIT) TABS tablet Take 1 tablet by mouth every evening    9/28/2020 at 0800     pantoprazole (PROTONIX) 40 MG EC tablet TAKE ONE TABLET BY MOUTH TWICE A DAY BEFORE MEALS 60 tablet 3 9/28/2020 at 0800     potassium chloride ER (KLOR-CON M) 10 MEQ CR tablet Take 1 tablet (10 mEq) by mouth daily 90 tablet 0 9/28/2020 at 0800     simvastatin (ZOCOR) 40 MG tablet TAKE 1 TABLET(40 MG) BY MOUTH AT BEDTIME 90 tablet 1 9/27/2020 at 2000     nitroGLYcerin (NITROSTAT) 0.4 MG sublingual tablet For chest pain place 1 tablet under the tongue every 5 minutes for 3 doses. If symptoms persist 5 minutes after 1st dose call 911. 12 tablet 0             Medications:        Current Facility-Administered Medications   Medication Last Rate     - MEDICATION INSTRUCTIONS -       Current Facility-Administered Medications   Medication Dose Route Frequency     acetaminophen  975 mg Oral TID     aspirin  81 mg Oral Daily     cefTRIAXone  2 g Intravenous Q24H     doxycycline hyclate  100 mg Oral Q12H JONI     fluticasone  1 puff Inhalation Daily     [Held by provider] ipratropium - albuterol 0.5 mg/2.5 mg/3 mL  3 mL Nebulization 4x daily     ipratropium-albuterol  1 puff Inhalation 4x daily     lidocaine  1 patch Transdermal Q24H     lidocaine   Transdermal Q8H     metoprolol tartrate  25 mg Oral BID     montelukast  10 mg Oral Daily     pantoprazole  40 mg Oral BID AC     simvastatin  40 mg Oral At Bedtime     sodium chloride (PF)  3 mL Intracatheter Q8H      Current Facility-Administered Medications   Medication Dose Route Frequency     bisacodyl  10 mg Rectal Daily PRN     lidocaine 4%   Topical Q1H PRN     lidocaine (buffered or not buffered)  0.1-1 mL Other Q1H PRN     melatonin  1 mg Oral At Bedtime PRN     naloxone  0.1-0.4 mg Intravenous Q2 Min PRN     ondansetron  4 mg Oral Q6H PRN    Or     ondansetron  4 mg Intravenous Q6H PRN     oxyCODONE  2.5-5 mg Oral Q4H PRN     - MEDICATION INSTRUCTIONS -   Does not apply Continuous PRN     polyethylene glycol  17 g Oral Daily PRN     prochlorperazine  5 mg Intravenous Q6H PRN    Or     prochlorperazine  5 mg Oral Q6H PRN    Or     prochlorperazine  12.5 mg Rectal Q12H PRN     senna-docusate  1 tablet Oral BID PRN    Or     senna-docusate  2 tablet Oral BID PRN     sodium chloride (PF)  3 mL Intracatheter q1 min prn            Data:      Lab data reviewed.   Recent Labs   Lab 09/29/20 0812 09/28/20 2029   HGB 8.2* 9.1*   MCV 67* 66*   * 144*    138   POTASSIUM 5.3 5.4*   CHLORIDE 109 109   CO2 29 25   BUN 77* 80*   CR 1.90* 1.93*   ANIONGAP <1* 4   AYALA 9.1 9.7   GLC 90 113*           Imaging:      Imaging data reviewed.     JAVID RichardsO.  [facility] Hospitalist  Pager [phone]

## 2020-09-30 NOTE — PROGRESS NOTES
Patient is COVID-19 Negative. , Isaac Palomino,notified of status. Received order from Provider to discontinue Special Precautions and transfer patient to med/surg floor if the beds are available. Charge RN and patient/family updated, and Special Precautions door sign turned over.

## 2020-10-01 ENCOUNTER — APPOINTMENT (OUTPATIENT)
Dept: OCCUPATIONAL THERAPY | Facility: CLINIC | Age: 85
DRG: 542 | End: 2020-10-01
Payer: COMMERCIAL

## 2020-10-01 ENCOUNTER — OFFICE VISIT (OUTPATIENT)
Dept: INTERPRETER SERVICES | Facility: CLINIC | Age: 85
End: 2020-10-01
Payer: COMMERCIAL

## 2020-10-01 VITALS
RESPIRATION RATE: 16 BRPM | HEART RATE: 55 BPM | SYSTOLIC BLOOD PRESSURE: 107 MMHG | TEMPERATURE: 96.9 F | WEIGHT: 150.1 LBS | HEIGHT: 68 IN | BODY MASS INDEX: 22.75 KG/M2 | OXYGEN SATURATION: 96 % | DIASTOLIC BLOOD PRESSURE: 33 MMHG

## 2020-10-01 LAB
BACTERIA SPEC CULT: NORMAL
DEPRECATED CALCIDIOL+CALCIFEROL SERPL-MC: 32 UG/L (ref 20–75)
SPECIMEN SOURCE: NORMAL

## 2020-10-01 PROCEDURE — 99239 HOSP IP/OBS DSCHRG MGMT >30: CPT | Performed by: HOSPITALIST

## 2020-10-01 PROCEDURE — 36415 COLL VENOUS BLD VENIPUNCTURE: CPT | Performed by: INTERNAL MEDICINE

## 2020-10-01 PROCEDURE — 97110 THERAPEUTIC EXERCISES: CPT | Mod: GO | Performed by: OCCUPATIONAL THERAPY ASSISTANT

## 2020-10-01 PROCEDURE — 250N000013 HC RX MED GY IP 250 OP 250 PS 637: Performed by: STUDENT IN AN ORGANIZED HEALTH CARE EDUCATION/TRAINING PROGRAM

## 2020-10-01 PROCEDURE — 250N000013 HC RX MED GY IP 250 OP 250 PS 637: Performed by: HOSPITALIST

## 2020-10-01 PROCEDURE — 97535 SELF CARE MNGMENT TRAINING: CPT | Mod: GO | Performed by: OCCUPATIONAL THERAPY ASSISTANT

## 2020-10-01 PROCEDURE — 82306 VITAMIN D 25 HYDROXY: CPT | Performed by: INTERNAL MEDICINE

## 2020-10-01 PROCEDURE — T1013 SIGN LANG/ORAL INTERPRETER: HCPCS | Mod: U3

## 2020-10-01 RX ORDER — DOXYCYCLINE 100 MG/1
100 CAPSULE ORAL 2 TIMES DAILY
Qty: 6 CAPSULE | Refills: 0 | Status: SHIPPED | OUTPATIENT
Start: 2020-10-01 | End: 2020-10-04

## 2020-10-01 RX ORDER — CEFPODOXIME PROXETIL 200 MG/1
200 TABLET, FILM COATED ORAL 2 TIMES DAILY
Qty: 14 TABLET | Refills: 0 | Status: SHIPPED | OUTPATIENT
Start: 2020-10-01 | End: 2020-10-06

## 2020-10-01 RX ADMIN — DOXYCYCLINE 100 MG: 100 CAPSULE ORAL at 08:30

## 2020-10-01 RX ADMIN — LIDOCAINE 1 PATCH: 560 PATCH PERCUTANEOUS; TOPICAL; TRANSDERMAL at 08:30

## 2020-10-01 RX ADMIN — METOPROLOL TARTRATE 25 MG: 25 TABLET, FILM COATED ORAL at 08:30

## 2020-10-01 RX ADMIN — IPRATROPIUM BROMIDE AND ALBUTEROL 1 PUFF: 20; 100 SPRAY, METERED RESPIRATORY (INHALATION) at 08:33

## 2020-10-01 RX ADMIN — IPRATROPIUM BROMIDE AND ALBUTEROL 1 PUFF: 20; 100 SPRAY, METERED RESPIRATORY (INHALATION) at 16:55

## 2020-10-01 RX ADMIN — DABIGATRAN ETEXILATE MESYLATE 150 MG: 150 CAPSULE ORAL at 08:30

## 2020-10-01 RX ADMIN — ASPIRIN 81 MG: 81 TABLET, COATED ORAL at 08:30

## 2020-10-01 RX ADMIN — PANTOPRAZOLE SODIUM 40 MG: 40 TABLET, DELAYED RELEASE ORAL at 06:56

## 2020-10-01 RX ADMIN — Medication 5 MG: at 01:10

## 2020-10-01 RX ADMIN — MONTELUKAST 10 MG: 10 TABLET, FILM COATED ORAL at 08:30

## 2020-10-01 RX ADMIN — FLUTICASONE FUROATE 1 PUFF: 100 POWDER RESPIRATORY (INHALATION) at 08:33

## 2020-10-01 RX ADMIN — ACETAMINOPHEN 975 MG: 325 TABLET, FILM COATED ORAL at 08:30

## 2020-10-01 RX ADMIN — PANTOPRAZOLE SODIUM 40 MG: 40 TABLET, DELAYED RELEASE ORAL at 16:54

## 2020-10-01 RX ADMIN — ACETAMINOPHEN 975 MG: 325 TABLET, FILM COATED ORAL at 16:53

## 2020-10-01 ASSESSMENT — ACTIVITIES OF DAILY LIVING (ADL)
ADLS_ACUITY_SCORE: 14

## 2020-10-01 ASSESSMENT — MIFFLIN-ST. JEOR: SCORE: 1340.35

## 2020-10-01 NOTE — CONSULTS
"Consult received for \"low K, renal diet education\"    Visited with pt this morning via   Provided and reviewed handouts: Low K, Low Phos, Low Na  Pt notes that he isn't a big eater  Does most cooking from scratch  Questions answered    Lety Weber RD, LD  Clinical Dietitian - [facility]   Pager - [phone]    "

## 2020-10-01 NOTE — DISCHARGE SUMMARY
"[facility]    Discharge Summary  Hospitalist    Date of Admission:  9/28/2020  Date of Discharge:  10/1/2020  Discharging Provider: Isaac Infante  Date of Service (when I saw the patient): 10/01/20    History of Present Illness   Shiraz Ingram is a 85 year old male who presents with lumbar spine pain.  He reports onset of sharp pain approximately 2 weeks ago following a physical therapy session where he was using dumbbell weights.  He reports the pain is sharp in nature and has been progressive over the past 2 weeks.  He denies any recent falls or trauma.  He denies any bowel or bladder incontinence, saddle anesthesia, lower extremity weakness or radicular symptoms.  He has been using his wife's cane for ambulation as he reports his right leg does feel \"sluggish\".  He was seen in the emergency room and provided a Medrol Dosepak in addition to Norco which he used some mild relief.  He reports chronic dyspnea on exertion but denies any acute worsening of this.  He denies any cough, fever or chills.  He reports he has chronic diarrhea and chronic loss of smell.  He denies any other covert symptoms including headache, sore throat, general myalgias or arthralgias.  His remainder of review of systems otherwise negative.    Hospital Course   Shiraz Ingram was admitted on 9/28/2020.  The following problems were addressed during his hospitalization:    Shiraz Ingram is a 85 year old deaf male admitted on 9/28/2020.  Past history of A-fib, HTN, CAD s/p CABG, aortic stenosis s/p TAVR, asthma, CKD who presents with low back pain due to compression fracture but was also found to have pneumonia and RD.      Community-acquired pneumonia Admission CT chest with right-sided infiltrates, the patient is without fever, leukocytosis or complaints of dyspnea.   - Received ceftriaxone and doxycycline in the emergency room.   - Continue course of oral antibiotics.   - Close outpatient follow up. "   - Symptomatic COVID swab is negative.      Acute to subacute L2 compression fracture  Back pain was his primary complaint for presentation with CT of the lumbar spine demonstrating fracture as noted.    - We will continue with pain control  - Patient declined TLSO brace at this time, discussed risks and benefits, declined. Also discussed with patient son via telephone, verbalized understanding, did state if patient changed mind or wanted to pursue TLSO this could still be an option in the future.   - Physical therapy and Occupational Therapy.     Possible AK I on chronic kidney disease stage III  Hyperkalemia, mild  Admission creatinine 1.93 with BUN of 80, potassium 5.4.  Most recent labs from August 2020 show creatinine of 2.08, however, prior to this creatinine was about 1.4.  Has lasix and lisinopril PTA. Denies NSAID use.   - Holding PTA lasix   - Restarted lisinopril on discharge.   - Nephrology following during inpatient stay.      Renal cysts   Noted on CT scan. Stable since February - but noted in nephrology consult note from 5/12 that they do not appear to be simple cysts - unsure if this was every follow-ed up or discussed further. Possibly contributing to renal dysfunction?   - Nephrology consulted during inpatient stay.   - Close outpatient follow up.      Chronic anemia and thrombocytopenia  Admission hemoglobin 9.1, platelets 144.  This is stable at baseline.  - Has been seen by cardiology for this, considering bone marrow biopsy.   - Patient states that he prefers to readdress in outpatient setting at this time.      A. fib status post PPM  CAD status post CABG x3  Aortic stenosis status post TAVR in July 2020  Hypertension  Hyperlipidemia  Most recent TTE from August 2020 shows ejection fraction of 50 to 55%, aortic valve functioning well.   - Continue prior to admission metoprolol, simvastatin.    - Hold prior to admission Lasix and potassium supplement  - dabigatran     Asthma  Continue prior to  admission Pulmicort, Montella cast and duo nebs.     Pending Results   These results will be followed up by PCP  Unresulted Labs Ordered in the Past 30 Days of this Admission     Date and Time Order Name Status Description    10/1/2020 0000 Vitamin D Deficiency In process     9/29/2020 0131 Sputum Culture Aerobic Bacterial Preliminary     9/28/2020 1948 Blood culture Preliminary     9/28/2020 1948 Blood culture Preliminary           Code Status   Full Code       Primary Care Physician   Dany Rodriguez MD    Physical Exam   Temp: 98.5  F (36.9  C) Temp src: Oral BP: (!) 152/55 Pulse: 56   Resp: 18 SpO2: (!) 76 % O2 Device: None (Room air)    Vitals:    09/29/20 0657 09/30/20 0824 10/01/20 0705   Weight: 68.9 kg (151 lb 14.4 oz) 68.7 kg (151 lb 6.4 oz) 68.1 kg (150 lb 1.6 oz)     Vital Signs with Ranges  Temp:  [96.1  F (35.6  C)-98.5  F (36.9  C)] 98.5  F (36.9  C)  Pulse:  [56-72] 56  Resp:  [18] 18  BP: (110-152)/(45-56) 152/55  SpO2:  [76 %-98 %] 76 %  I/O last 3 completed shifts:  In: 600 [P.O.:600]  Out: 1150 [Urine:1150]    GENERAL: Alert and oriented. NAD. Conversational, appropriate.   HEENT: Normocephalic. EOMI. No icterus or injection. Nares normal.   LUNGS: Clear to auscultation. No dyspnea at rest.   HEART: Irregular rate. Extremities perfused.   ABDOMEN: Soft, nontender, and nondistended. Positive bowel sounds.   EXTREMITIES: LE edema noted.   NEUROLOGIC: Moves extremities x4, follows commands.     Discharge Disposition   Discharged to home  Condition at discharge: Stable    Consultations This Hospital Stay   OCCUPATIONAL THERAPY ADULT IP CONSULT  PHYSICAL THERAPY ADULT IP CONSULT  NEPHROLOGY IP CONSULT  PHARMACY IP CONSULT  NUTRITION SERVICES ADULT IP CONSULT  CARE MANAGEMENT / SOCIAL WORK IP CONSULT    Time Spent on this Encounter   Isaac RUTH DO, personally saw the patient today and spent greater than 30 minutes discharging this patient.    Discharge Orders      Reason for your  hospital stay    Acute kidney injury     Follow-up and recommended labs and tests     Follow up with primary care provider, Dany Rodriguez MD, within 7 days for hospital follow- up.  The following labs/tests are recommended: cbc/bmp.    Follow up with Nephrology as directed.     Activity    Your activity upon discharge: activity as tolerated     Full Code     Diet    Follow this diet upon discharge: Orders Placed This Encounter      Combination Diet Renal Diet; Low Saturated Fat Na <2400mg Diet     Discharge Medications   Current Discharge Medication List      START taking these medications    Details   cefpodoxime (VANTIN) 200 MG tablet Take 1 tablet (200 mg) by mouth 2 times daily for 5 days  Qty: 14 tablet, Refills: 0    Associated Diagnoses: Community acquired pneumonia, unspecified laterality      doxycycline hyclate (VIBRAMYCIN) 100 MG capsule Take 1 capsule (100 mg) by mouth 2 times daily for 3 days  Qty: 6 capsule, Refills: 0    Associated Diagnoses: Community acquired pneumonia, unspecified laterality         CONTINUE these medications which have NOT CHANGED    Details   albuterol (PROAIR HFA/PROVENTIL HFA/VENTOLIN HFA) 108 (90 Base) MCG/ACT inhaler Inhale 2 puffs into the lungs 4 times daily as needed for shortness of breath / dyspnea or wheezing    Comments: Pharmacy may dispense brand covered by insurance (Proair, or proventil or ventolin or generic albuterol inhaler)      aspirin (ASA) 81 MG EC tablet Take 81 mg by mouth daily      budesonide (PULMICORT) 0.5 MG/2ML nebulizer solution Take 2 mLs (0.5 mg) by nebulization 2 times daily  Qty: 120 mL, Refills: 0    Associated Diagnoses: Moderate persistent asthma      calcium carbonate 600 mg-vitamin D 400 units (CALTRATE) 600-400 MG-UNIT per tablet Take 1 tablet by mouth daily      dabigatran ANTICOAGULANT (PRADAXA) 150 MG capsule Take 1 capsule (150 mg) by mouth 2 times daily Store in original 's bottle or blister pack; use within 120  days of opening.  Qty: 180 capsule, Refills: 3    Associated Diagnoses: Coronary artery disease involving native coronary artery of native heart without angina pectoris      ferrous sulfate (IRON) 325 (65 Fe) MG tablet TAKE 1 TABLET(325 MG) BY MOUTH TWICE DAILY  Qty: 180 tablet, Refills: 0    Associated Diagnoses: Anemia, unspecified type      ipratropium - albuterol 0.5 mg/2.5 mg/3 mL (DUONEB) 0.5-2.5 (3) MG/3ML nebulization Inhale 1 vial (3 mLs) into the lungs 4 times daily  Qty: 360 mL, Refills: 11    Associated Diagnoses: Moderate persistent asthma, uncomplicated      lisinopril (ZESTRIL) 20 MG tablet Take 1 tablet (20 mg) by mouth daily  Qty: 90 tablet, Refills: 1    Associated Diagnoses: Severe aortic stenosis; Permanent atrial fibrillation (H); Thrombus of left atrial appendage      meclizine (ANTIVERT) 12.5 MG tablet Take 1 tablet (12.5 mg) by mouth 3 times daily as needed for dizziness  Qty: 90 tablet, Refills: 1    Associated Diagnoses: Dizziness      melatonin 3 MG tablet Take 1 tablet (3 mg) by mouth nightly as needed for sleep  Qty: 30 tablet, Refills: 0    Associated Diagnoses: Insomnia, unspecified type      metoprolol tartrate (LOPRESSOR) 25 MG tablet Take 1 tablet (25 mg) by mouth 2 times daily  Qty: 60 tablet, Refills: 0    Comments: Future refills by PCP Dr. Dany Rodriguez MD with phone number [phone].  Associated Diagnoses: Severe aortic stenosis; Complete heart block (H); Permanent atrial fibrillation (H); Thrombus of left atrial appendage      montelukast (SINGULAIR) 10 MG tablet TAKE 1 TABLET(10 MG) BY MOUTH DAILY  Qty: 90 tablet, Refills: 1    Comments: **Patient requests 90 days supply**  Associated Diagnoses: Moderate persistent asthma      multivitamin, therapeutic (THERA-VIT) TABS tablet Take 1 tablet by mouth every evening       pantoprazole (PROTONIX) 40 MG EC tablet TAKE ONE TABLET BY MOUTH TWICE A DAY BEFORE MEALS  Qty: 60 tablet, Refills: 3    Associated Diagnoses:  Ulcer of esophagus with bleeding      simvastatin (ZOCOR) 40 MG tablet TAKE 1 TABLET(40 MG) BY MOUTH AT BEDTIME  Qty: 90 tablet, Refills: 1    Associated Diagnoses: Hyperlipidemia with target LDL less than 100      nitroGLYcerin (NITROSTAT) 0.4 MG sublingual tablet For chest pain place 1 tablet under the tongue every 5 minutes for 3 doses. If symptoms persist 5 minutes after 1st dose call 911.  Qty: 12 tablet, Refills: 0    Comments: Future refills by PCP Dr. Dany Rodriguez MD with phone number [phone].  Associated Diagnoses: Severe aortic stenosis; Complete heart block (H); Permanent atrial fibrillation (H); Thrombus of left atrial appendage         STOP taking these medications       furosemide (LASIX) 20 MG tablet Comments:   Reason for Stopping:         potassium chloride ER (KLOR-CON M) 10 MEQ CR tablet Comments:   Reason for Stopping:             Allergies   Allergies   Allergen Reactions     No Known Drug Allergies      Data   Most Recent 3 CBC's:  Recent Labs   Lab Test 09/30/20 1024 09/29/20 0812 09/28/20 2029   WBC 4.6 4.6 6.9   HGB 8.2* 8.2* 9.1*   MCV 66* 67* 66*   * 116* 144*      Most Recent 3 BMP's:  Recent Labs   Lab Test 09/30/20  1024 09/29/20  0812 09/28/20 2029    138 138   POTASSIUM 4.7 5.3 5.4*   CHLORIDE 106 109 109   CO2 26 29 25   BUN 54* 77* 80*   CR 1.49* 1.90* 1.93*   ANIONGAP 3 <1* 4   AYALA 9.3 9.1 9.7   * 90 113*     Most Recent 2 LFT's:  Recent Labs   Lab Test 09/28/20 2029 07/14/20  1650   AST 16 20   ALT 19 21   ALKPHOS 64 60   BILITOTAL 0.6 0.5     Most Recent INR's and Anticoagulation Dosing History:  Anticoagulation Dose History     Recent Dosing and Labs Latest Ref Rng & Units 3/16/2012 4/23/2020 4/24/2020 5/23/2020 6/3/2020 7/13/2020    INR 0.86 - 1.14 1.11 2.35(H) 1.24(H) 2.18(H) 1.25(H) 1.21(H)        Most Recent 3 Troponin's:  Recent Labs   Lab Test 07/28/20  0643 07/28/20  0247 07/27/20  1906   TROPI 0.041 0.050* <0.015     Most Recent  Cholesterol Panel:  Recent Labs   Lab Test 06/01/20  0907   CHOL 119   LDL 54   HDL 48   TRIG 87     Most Recent 6 Bacteria Isolates From Any Culture (See EPIC Reports for Culture Details):  Recent Labs   Lab Test 09/29/20  0950 09/28/20  2049 09/28/20 2028 07/04/20  1201 07/03/20  1558 01/29/17  1903   CULT Heavy growth  Normal katie to date   No growth after 3 days No growth after 3 days  No growth Light growth  Normal katie   No growth  No growth No growth     Most Recent TSH, T4 and A1c Labs:  Recent Labs   Lab Test 02/21/13  1702   TSH 1.66     Results for orders placed or performed during the hospital encounter of 09/28/20   Lumbar spine CT w/o contrast    Narrative    CT LUMBAR SPINE WITHOUT CONTRAST  9/28/2020 9:18 PM     HISTORY: Back pain, chills.     TECHNIQUE: Axial images of the lumbar spine were obtained without  intravenous contrast. Multiplanar reformations were performed.   Radiation dose for this scan was reduced using automated exposure  control, adjustment of the mA and/or kV according to patient size, or  iterative reconstruction technique.     COMPARISON: None. Correlation is made with CT chest, abdomen and  pelvis of same date.    FINDINGS:  Five lumbar-type vertebral bodies are present. There is a  recent-appearing fracture involving the superior endplate of L2 with  mild associated height loss. Chronic moderate to severe compression  fracture involving the superior and inferior endplates of T12. Mild to  moderate superior endplate concavity involving the L3 vertebral body.  Minimal concavity involving the posterior aspect of the L5 inferior  endplate appears chronic.    Alignment of the lumbar spine is within normal limits. The  intervertebral discs are relatively maintained in height. No definite  high-grade spinal canal stenosis. There is mild left L3-L4 neural  foraminal narrowing. Otherwise, no significant neural foraminal  stenosis. There are multiple bilateral renal masses, many of  which are  hyperdense as compared to the renal parenchyma. These are better  characterized on CT of the chest, abdomen and pelvis of same date.  Diffuse atherosclerotic disease of the abdominal aorta and its major  branches, with ectasia of the infrarenal abdominal aorta measuring up  to 2.7-2.8 cm in the axial plane. Bilateral sacroiliac joint  degenerative changes.      Impression    IMPRESSION:  1. Acute-to-subacute appearing mild superior endplate compression  fracture of L2.  2. Chronic-appearing compression/contour deformities involving T12,  L3, and L5.  3. No high-grade spinal canal or neural foraminal stenosis.  4. Infrarenal abdominal aortic ectasia measuring up to 2.7-2.8 cm in  diameter in the axial plane.  5. Multiple renal masses, better characterized on CT of the chest,  abdomen and pelvis of same date. Please see that separate report for  additional details    SRIKANTH DO MD   CT Chest Abdomen Pelvis w/o Contrast    Narrative    CT CHEST ABDOMEN PELVIS WITHOUT CONTRAST 9/28/2020 9:18 PM    CLINICAL HISTORY: Sepsis. Chills/fever, shortness of breath. Right  lower quadrant abdominal pain, vomiting. Evaluate appendix, stone.  Pneumonia.    TECHNIQUE: CT scan of the chest, abdomen, and pelvis was performed  without IV contrast. Multiplanar reformats were obtained. Dose  reduction techniques were used.   CONTRAST: None.    COMPARISON: CT chest abdomen and pelvis 9/2/2020.    FINDINGS:   LUNGS AND PLEURA: Trace right pleural fluid. New extensive dense  irregular consolidation occupying the right upper lobe. There is also  small new nodular areas of consolidation noted at the right middle  lobe, right lower lobe as well. There are additional multiple  bilateral pulmonary nodules again noted as on the prior exam. These  multiple nodules do not show significant short interval change. An  example of one of these is at the periphery of the right lower lobe  continuing to measure 0.6 cm series 6 image  175.    MEDIASTINUM/AXILLAE: Thoracic aortic and diffuse coronary artery  calcifications. Stable mild cardiomegaly. Stable mediastinal lymph  nodes. Borderline-prominent stable subcarinal lymph node measuring 1.1  cm series 4 image 62. Left chest pacemaker. Aortic root postoperative  change.    HEPATOBILIARY: Cholecystectomy. No acute hepatic abnormality.    PANCREAS: Normal.    SPLEEN: Normal.    ADRENAL GLANDS: Stable minimal thickening.    KIDNEYS/BLADDER: Stable multiple bilateral hyperdense renal lesions,  many of which are exophytic. No hydronephrosis. Unremarkable bladder.    BOWEL: No obstruction or acute inflammation. No free fluid, free air,  or abscess. No evidence to suggest appendicitis.    LYMPH NODES: Normal.    VASCULATURE: Diffuse calcifications.    PELVIC ORGANS: No acute abnormality. Prominent prostate is 5.1 cm.    OTHER: Stable focal locule of fluid at the left inguinal region that  is 2.9 cm series 4 image 266.    MUSCULOSKELETAL: Stable T12 compression fracture deformity. No  aggressive bony lesion otherwise identified.      Impression    IMPRESSION:  1.  New dense consolidation at the right lung, dominantly seen at the  right upper lobe consistent with lobar pneumonia. There is also  involvement of the right middle lobe and right lower lobe.  2.  Numerous bilateral pulmonary nodules appear grossly stable.  3.  Stable mildly prominent mediastinal lymph node.  4.  Stable multiple hyperdense renal lesions. These could be  hemorrhagic or proteinaceous cysts. Solid renal lesion is not  excluded.  5.  Diffuse vascular calcifications.    JERAMIE DELGADILLO MD

## 2020-10-01 NOTE — PLAN OF CARE
Orientation: A&O 4  Aggression Stop Light: Green   Mobility:Lift  Pain Management:PRN oxy  Diet:Renal   Bowel/Bladder:Continent  Abnormal Lab/Assessments: creat 1.49, Ferritin 801  Drain/Device/Wound:none  Consults: Nephro      Shift Note:   A&Ox4. Deaf, ASL  in place, when  gone can communicate well enough with paper. Up w/SBA. Renal diet. VSS. C/o 5/10 back pain, L2 FX, given oxy  prn. IV SL. Discharge pending improvement

## 2020-10-01 NOTE — PROGRESS NOTES
CM Discharge Care Management Discharge Note    Discharge Planning:  Expected Discharge Date: 10/02/20  Concerns to be Addressed:  Follow up with Nephrology       Anticipated Discharge Disposition:  Home with sons  Anticipated Discharge Services:  none  Anticipated Discharge DME:  none    Patient/family educated on Medicare website which has current facility and service quality ratings:  NA  Referrals Placed by CM/SW:  Follow up appointment, outpatient CM referral sent.   Education Provided on the Discharge Plan:  NA  Patient/Family in Agreement with the Plan:  yes  Disposition Comments:  Nephrology and PCP scheduled. On AVS.      Keeley Cross RN

## 2020-10-01 NOTE — PROGRESS NOTES
Renal Medicine Progress Note                                Shiraz Ingram MRN# [id number]   Age: 85 year old YOB: 1934   Date of Admission: 9/28/2020 Hospital LOS: 3                  Assessment/Plan:     1.  Baseline chronic kidney disease, stage III.  Baseline creatinine in the range of 1.5 mg/dL with an estimated GFR of roughly 40-45 mL per minute.     2.  Modest dipstick albuminuria with only slight elevation in albumin to creatinine ratios by review.     3.  Multiple renal masses, which by serial CT scans, appear stable by review.     4.  Acute kidney injury with presenting creatinine of 1.93.  Today's value pending.  He received 500 mL of saline in the Emergency Room.  No ongoing IV maintenance fluid at this time.  Ace inhibitor and diuretic have been held.     5.  Hyperkalemia, multifactorial including acute kidney injury, Ace inhibitor, potential K supplementation.     6.  Anemia.     7.  Acute L2 compression fracture.     8.  Community-acquired pneumonia, COVID-19 negative.     9.  Hypertension.       Renal at baseline    No further K supplement at this time  Hold diuretic    Restart ACE as indicated for BP      Follow-up renal NP 4 to 6 weeks ([phone])  Call with questions  Signing off       Interval History:     Up in room   Follows    Good UO    Labs have normalized       ROS:     GENERAL: NAD, No fever,chills  R: NEGATIVE for significant cough or SOB  CV: NEGATIVE for chest pain, palpitations  EXT: no change edema  ROS otherwise negative    Medications and Allergies:     Reviewed    Physical Exam:     Vitals were reviewed  Patient Vitals for the past 12 hrs:   BP Temp Temp src Pulse Resp SpO2 Weight   10/01/20 0800 (!) 152/55 98.5  F (36.9  C) Oral 56 18 (!) 76 % --   10/01/20 0705 -- -- -- -- -- -- 68.1 kg (150 lb 1.6 oz)   10/01/20 0501 127/50 96.6  F (35.9  C) Oral -- -- 96 % --   09/30/20 2300 122/56 97  F (36.1  C) Oral 59 18 96 % --     I/O last 3 completed shifts:  In: 600  [P.O.:600]  Out: 1150 [Urine:1150]    Vitals:    09/28/20 1745 09/29/20 0657 09/30/20 0824 10/01/20 0705   Weight: 67.6 kg (149 lb) 68.9 kg (151 lb 14.4 oz) 68.7 kg (151 lb 6.4 oz) 68.1 kg (150 lb 1.6 oz)         GENERAL: awake, alert, follows  HEENT: NC/AT, PERRLA, EOMI, non icteric, pharynx moist without lesion  RESP:  clear anteriorly  CV: RRR, normal S1 S2  ABDOMEN: soft, nontender, no HSM or masses and bowel sounds normal  MS: no clubbing, cyanosis   SKIN: clear without significant rashes or lesions  EXT: warm, trace edema    Data:     Recent Labs   Lab 09/30/20  1024 09/29/20  0812 09/28/20 2029    138 138   POTASSIUM 4.7 5.3 5.4*   CHLORIDE 106 109 109   CO2 26 29 25   ANIONGAP 3 <1* 4   * 90 113*   BUN 54* 77* 80*   CR 1.49* 1.90* 1.93*   GFRESTIMATED 42* 31* 31*   GFRESTBLACK 49* 36* 36*   AYALA 9.3 9.1 9.7         Recent Labs   Lab 09/30/20  1024 09/29/20  0812 09/28/20 2029   CR 1.49* 1.90* 1.93*     Recent Labs   Lab 09/28/20 2029   ALBUMIN 3.2*     Recent Labs   Lab 09/30/20  1024 09/29/20  0812 09/28/20 2029   HGB 8.2* 8.2* 9.1*         G Derrell Ferrell MD    [facility] - Nephrology  [phone]

## 2020-10-01 NOTE — PROGRESS NOTES
Pt. To discharge around 1700 with family member. IV out. Meds given. Discharge paperwork given during day shift. VSS on RA; Nursing will continue to monitor and assess.    Corrie Sanders RN on 10/1/2020 at 5:02 PM

## 2020-10-02 ENCOUNTER — TELEPHONE (OUTPATIENT)
Dept: FAMILY MEDICINE | Facility: CLINIC | Age: 85
End: 2020-10-02

## 2020-10-02 NOTE — PLAN OF CARE
Physical Therapy Discharge Summary    Reason for therapy discharge:    Discharged to home.    Progress towards therapy goal(s). See goals on Care Plan in [facility] electronic health record for goal details.  Goals partially met.  Barriers to achieving goals:   discharge from facility.    Therapy recommendation(s):    Continued therapy is recommended.  Rationale/Recommendations:  Pt partially met goal for stairs due to deconditioning and dropping O2 sats. Pt is below baseline for endurance and O2 tolerance and OP PT was recommended.

## 2020-10-02 NOTE — PLAN OF CARE
Occupational Therapy Discharge Summary    Reason for therapy discharge:    Discharged to home.    Progress towards therapy goal(s). See goals on Care Plan in [facility] electronic health record for goal details.  Goals not met.  Barriers to achieving goals:   discharge from facility.    Therapy recommendation(s):    No further therapy is recommended.

## 2020-10-04 LAB
BACTERIA SPEC CULT: NO GROWTH
BACTERIA SPEC CULT: NO GROWTH
SPECIMEN SOURCE: NORMAL
SPECIMEN SOURCE: NORMAL

## 2020-10-05 ENCOUNTER — PATIENT OUTREACH (OUTPATIENT)
Dept: CARE COORDINATION | Facility: CLINIC | Age: 85
End: 2020-10-05

## 2020-10-05 DIAGNOSIS — I10 HYPERTENSION GOAL BP (BLOOD PRESSURE) < 140/90: ICD-10-CM

## 2020-10-05 NOTE — PROGRESS NOTES
Clinic Care Coordination Contact  [facility]/Voicemail       Clinical Data: Care Coordinator Outreach  Outreach attempted x 1.  Left message on patient's voicemail with call back information and requested return call.  Plan: Care Coordinator will try to reach patient again in 1-2 business days.

## 2020-10-05 NOTE — LETTER
October 6, 2020    Dear Shiraz,                                                                         You were recently referred to the [facility]'s Clinic Care Coordination service.  This is a service offered through your Primary Care Clinic which can help you access resources and services in regard to your health and well-being goals. The clinic Community Health Worker has placed two calls to you to discuss the nature of this service and to offer enrollment.  If you are interested in learning more about Clinic Care Coordination, please call your Primary Care Clinic's Community Health Worker, Mandy, at [phone].                                                    Sincerely,                                                                              CIARAN Galvan                                                                                          Clinic Care Coordination                                                  [facility]

## 2020-10-06 RX ORDER — METOPROLOL TARTRATE 25 MG/1
25 TABLET, FILM COATED ORAL 2 TIMES DAILY
Qty: 180 TABLET | Refills: 0 | Status: SHIPPED | OUTPATIENT
Start: 2020-10-06 | End: 2020-12-30

## 2020-10-06 NOTE — PROGRESS NOTES
Community Health Worker called and left a message for the patient.  If the patient is returning my call, please transfer the patient to [facility] at ext. [phone].   Patient has been mailed a unreachable letter and was provided with CHW contact information if they are interested in accessing Clinic Care Coordination.  Order for Care Management has been closed, no further outreach will be done at this time and patient can be re-referred.

## 2020-10-12 ENCOUNTER — OFFICE VISIT (OUTPATIENT)
Dept: FAMILY MEDICINE | Facility: CLINIC | Age: 85
End: 2020-10-12
Payer: COMMERCIAL

## 2020-10-12 VITALS
HEIGHT: 68 IN | DIASTOLIC BLOOD PRESSURE: 60 MMHG | RESPIRATION RATE: 16 BRPM | TEMPERATURE: 97.9 F | SYSTOLIC BLOOD PRESSURE: 130 MMHG | WEIGHT: 158.2 LBS | BODY MASS INDEX: 23.98 KG/M2 | HEART RATE: 66 BPM | OXYGEN SATURATION: 97 %

## 2020-10-12 DIAGNOSIS — S32.020D CLOSED COMPRESSION FRACTURE OF L2 LUMBAR VERTEBRA WITH ROUTINE HEALING, SUBSEQUENT ENCOUNTER: ICD-10-CM

## 2020-10-12 DIAGNOSIS — J18.9 PNEUMONIA DUE TO INFECTIOUS ORGANISM, UNSPECIFIED LATERALITY, UNSPECIFIED PART OF LUNG: Primary | ICD-10-CM

## 2020-10-12 PROCEDURE — T1013 SIGN LANG/ORAL INTERPRETER: HCPCS | Mod: U3 | Performed by: FAMILY MEDICINE

## 2020-10-12 PROCEDURE — 99495 TRANSJ CARE MGMT MOD F2F 14D: CPT | Performed by: FAMILY MEDICINE

## 2020-10-12 ASSESSMENT — MIFFLIN-ST. JEOR: SCORE: 1377.09

## 2020-10-12 NOTE — PROGRESS NOTES
"Subjective     Shiraz Ingram is a 85 year old male who presents to clinic today for the following health issues:    HPI           Hospital Follow-up Visit:    Hospital/Nursing Home/IP Rehab Facility: [facility]  Date of Admission: 09/28/2020  Date of Discharge: 10/01/2020  Reason(s) for Admission: pneumonia      Was your hospitalization related to COVID-19? {Yes add questions_No:400821}  Problems taking medications regularly:  {NONE DEFAULTED:237296::\"None\"}  Medication changes since discharge: {NONE DEFAULTED:317784::\"None\"}  Problems adhering to non-medication therapy:  {NONE DEFAULTED:425595::\"None\"}    Summary of hospitalization:  {HOSPITAL DISCHARGE SUMMARY INFO:274181::\"[place] hospital discharge summary reviewed\"}  Diagnostic Tests/Treatments reviewed.  Follow up needed: {NONE DEFAULTED:668540::\"none\"}  Other Healthcare Providers Involved in Patient s Care:         {those currently involved after discharge:635044::\"None\"}  Update since discharge: {IMPROVED DEFAULT:941208::\"improved.\"} {TIP  Include information from family/caregivers, SNF, Care Coordination :552253}      Post Discharge Medication Reconciliation: {ACO Med Rec (Provider):579470}.  Plan of care communicated with {Communicate Plan to:127869::\"patient\"}     {Reference  Coding guidelines- Moderate Complexity F2F/Video within 7 - 14 days of discharge 11701, High Complexity F2F/Video within 7 days 37340 or [id number] days 36548 :725104}         {additonal problems for provider to add (Optional):610392}    Review of Systems   {ROS COMP (Optional):410941}      Objective    There were no vitals taken for this visit.  There is no height or weight on file to calculate BMI.  Physical Exam   {Exam List (Optional):148652}    {Diagnostic Test Results (Optional):125389}        {PROVIDER CHARTING PREFERENCE:103285}      "

## 2020-10-12 NOTE — PROGRESS NOTES
"Subjective     Shiraz Ingram is a 85 year old male who presents to clinic today for the following health issues:    HPI        Hospital Follow-up Visit:    Hospital/Nursing Home/IP Rehab Facility: [facility]  Date of Admission: 09/28/2020  Date of Discharge: 10/01/2020  Reason(s) for Admission: pneumonia       Was your hospitalization related to COVID-19? No   Problems taking medications regularly:  None  Medication changes since discharge: None  Problems adhering to non-medication therapy:  None    Summary of hospitalization:  [facility] discharge summary reviewed  Diagnostic Tests/Treatments reviewed.  Follow up needed: none  Other Healthcare Providers Involved in Patient s Care:         None  Update since discharge: improved.       Post Discharge Medication Reconciliation: discharge medications reconciled, continue medications without change.  Plan of care communicated with patient              Back is OK.   It is better than last time.   Also found to have pneumonia - surprised about it.     Done with antibiotics.  Wondering why pneumonia vaccine did not help him.     On pradexa twice per day.     Not needing to use rescue inhaler as much.       Review of Systems   Constitutional, HEENT, cardiovascular, pulmonary, gi and gu systems are negative, except as otherwise noted.      Objective    /60 (BP Location: Right arm, Patient Position: Sitting, Cuff Size: Adult Regular)   Pulse 66   Temp 97.9  F (36.6  C) (Oral)   Resp 16   Ht 1.727 m (5' 8\")   Wt 71.8 kg (158 lb 3.2 oz)   SpO2 97%   BMI 24.05 kg/m    Body mass index is 24.05 kg/m .  Physical Exam   GENERAL: healthy, alert and no distress  EYES: Eyes grossly normal to inspection, PERRL and conjunctivae and sclerae normal  HENT: ear canals and TM's normal, nose and mouth without ulcers or lesions  NECK: no adenopathy, no asymmetry, masses, or scars and thyroid normal to palpation  RESP: lungs clear to auscultation - no rales, " rhonchi or wheezes  PSYCH: mentation appears normal, affect normal/bright             Assessment & Plan     Pneumonia due to infectious organism, unspecified laterality, unspecified part of lung  Doing well. Off antibiotics.     Closed compression fracture of L2 lumbar vertebra with routine healing, subsequent encounter  Mostly asymptomatic now. Continue to monitor. He does not think there is a need for intervention at this point.     Aware of renal cyst and changes and stable - does not want other intervention.         Dany Rodriguez MD, MD  [facility]

## 2020-10-27 ENCOUNTER — APPOINTMENT (OUTPATIENT)
Dept: GENERAL RADIOLOGY | Facility: CLINIC | Age: 85
DRG: 291 | End: 2020-10-27
Attending: EMERGENCY MEDICINE
Payer: COMMERCIAL

## 2020-10-27 ENCOUNTER — HOSPITAL ENCOUNTER (INPATIENT)
Facility: CLINIC | Age: 85
LOS: 3 days | Discharge: HOME | DRG: 291 | End: 2020-10-30
Attending: EMERGENCY MEDICINE | Admitting: HOSPITALIST
Payer: COMMERCIAL

## 2020-10-27 ENCOUNTER — OFFICE VISIT (OUTPATIENT)
Dept: FAMILY MEDICINE | Facility: CLINIC | Age: 85
End: 2020-10-27
Payer: COMMERCIAL

## 2020-10-27 VITALS
DIASTOLIC BLOOD PRESSURE: 82 MMHG | TEMPERATURE: 97.9 F | OXYGEN SATURATION: 94 % | WEIGHT: 153 LBS | SYSTOLIC BLOOD PRESSURE: 168 MMHG | RESPIRATION RATE: 18 BRPM | BODY MASS INDEX: 23.26 KG/M2 | HEART RATE: 74 BPM

## 2020-10-27 DIAGNOSIS — Z95.2 HISTORY OF TRANSCATHETER AORTIC VALVE REPLACEMENT (TAVR): ICD-10-CM

## 2020-10-27 DIAGNOSIS — I50.9 ACUTE CONGESTIVE HEART FAILURE, UNSPECIFIED HEART FAILURE TYPE (H): ICD-10-CM

## 2020-10-27 DIAGNOSIS — J45.40 MODERATE PERSISTENT ASTHMA WITHOUT COMPLICATION: Primary | ICD-10-CM

## 2020-10-27 DIAGNOSIS — R06.02 SHORTNESS OF BREATH: Primary | ICD-10-CM

## 2020-10-27 LAB
ANION GAP SERPL CALCULATED.3IONS-SCNC: 7 MMOL/L (ref 3–14)
ANISOCYTOSIS BLD QL SMEAR: ABNORMAL
BASOPHILS # BLD AUTO: 0 10E9/L (ref 0–0.2)
BASOPHILS NFR BLD AUTO: 0.6 %
BUN SERPL-MCNC: 31 MG/DL (ref 7–30)
CALCIUM SERPL-MCNC: 9.4 MG/DL (ref 8.5–10.1)
CHLORIDE SERPL-SCNC: 107 MMOL/L (ref 94–109)
CO2 SERPL-SCNC: 24 MMOL/L (ref 20–32)
CREAT SERPL-MCNC: 1.32 MG/DL (ref 0.66–1.25)
DACRYOCYTES BLD QL SMEAR: SLIGHT
DIFFERENTIAL METHOD BLD: ABNORMAL
ELLIPTOCYTES BLD QL SMEAR: SLIGHT
EOSINOPHIL # BLD AUTO: 0.3 10E9/L (ref 0–0.7)
EOSINOPHIL NFR BLD AUTO: 8.3 %
ERYTHROCYTE [DISTWIDTH] IN BLOOD BY AUTOMATED COUNT: 19 % (ref 10–15)
GFR SERPL CREATININE-BSD FRML MDRD: 49 ML/MIN/{1.73_M2}
GLUCOSE SERPL-MCNC: 96 MG/DL (ref 70–99)
HCT VFR BLD AUTO: 32.6 % (ref 40–53)
HGB BLD-MCNC: 10.2 G/DL (ref 13.3–17.7)
IMM GRANULOCYTES # BLD: 0 10E9/L (ref 0–0.4)
IMM GRANULOCYTES NFR BLD: 0 %
LYMPHOCYTES # BLD AUTO: 0.5 10E9/L (ref 0.8–5.3)
LYMPHOCYTES NFR BLD AUTO: 15.7 %
MCH RBC QN AUTO: 21.3 PG (ref 26.5–33)
MCHC RBC AUTO-ENTMCNC: 31.3 G/DL (ref 31.5–36.5)
MCV RBC AUTO: 68 FL (ref 78–100)
MONOCYTES # BLD AUTO: 0.2 10E9/L (ref 0–1.3)
MONOCYTES NFR BLD AUTO: 6.2 %
NEUTROPHILS # BLD AUTO: 2.3 10E9/L (ref 1.6–8.3)
NEUTROPHILS NFR BLD AUTO: 69.2 %
NRBC # BLD AUTO: 0 10*3/UL
NRBC BLD AUTO-RTO: 0 /100
NT-PROBNP SERPL-MCNC: 9974 PG/ML (ref 0–1800)
OVALOCYTES BLD QL SMEAR: SLIGHT
PLATELET # BLD AUTO: 105 10E9/L (ref 150–450)
PLATELET # BLD EST: ABNORMAL 10*3/UL
POTASSIUM SERPL-SCNC: 4.8 MMOL/L (ref 3.4–5.3)
PROCALCITONIN SERPL-MCNC: <0.05 NG/ML
RBC # BLD AUTO: 4.8 10E12/L (ref 4.4–5.9)
RBC INCLUSIONS BLD: SLIGHT
SODIUM SERPL-SCNC: 138 MMOL/L (ref 133–144)
TARGETS BLD QL SMEAR: SLIGHT
TROPONIN I SERPL-MCNC: 0.03 UG/L (ref 0–0.04)
WBC # BLD AUTO: 3.4 10E9/L (ref 4–11)

## 2020-10-27 PROCEDURE — 80048 BASIC METABOLIC PNL TOTAL CA: CPT | Performed by: EMERGENCY MEDICINE

## 2020-10-27 PROCEDURE — 99222 1ST HOSP IP/OBS MODERATE 55: CPT | Mod: AI | Performed by: HOSPITALIST

## 2020-10-27 PROCEDURE — C9803 HOPD COVID-19 SPEC COLLECT: HCPCS

## 2020-10-27 PROCEDURE — 210N000002 HC R&B HEART CARE

## 2020-10-27 PROCEDURE — 85025 COMPLETE CBC W/AUTO DIFF WBC: CPT | Performed by: EMERGENCY MEDICINE

## 2020-10-27 PROCEDURE — 93005 ELECTROCARDIOGRAM TRACING: CPT

## 2020-10-27 PROCEDURE — 84484 ASSAY OF TROPONIN QUANT: CPT | Performed by: EMERGENCY MEDICINE

## 2020-10-27 PROCEDURE — 250N000013 HC RX MED GY IP 250 OP 250 PS 637: Performed by: HOSPITALIST

## 2020-10-27 PROCEDURE — 84145 PROCALCITONIN (PCT): CPT | Performed by: EMERGENCY MEDICINE

## 2020-10-27 PROCEDURE — U0003 INFECTIOUS AGENT DETECTION BY NUCLEIC ACID (DNA OR RNA); SEVERE ACUTE RESPIRATORY SYNDROME CORONAVIRUS 2 (SARS-COV-2) (CORONAVIRUS DISEASE [COVID-19]), AMPLIFIED PROBE TECHNIQUE, MAKING USE OF HIGH THROUGHPUT TECHNOLOGIES AS DESCRIBED BY CMS-2020-01-R: HCPCS | Performed by: EMERGENCY MEDICINE

## 2020-10-27 PROCEDURE — 96374 THER/PROPH/DIAG INJ IV PUSH: CPT

## 2020-10-27 PROCEDURE — 250N000009 HC RX 250: Performed by: HOSPITALIST

## 2020-10-27 PROCEDURE — 71045 X-RAY EXAM CHEST 1 VIEW: CPT

## 2020-10-27 PROCEDURE — 250N000011 HC RX IP 250 OP 636: Performed by: EMERGENCY MEDICINE

## 2020-10-27 PROCEDURE — 99214 OFFICE O/P EST MOD 30 MIN: CPT | Performed by: FAMILY MEDICINE

## 2020-10-27 PROCEDURE — 250N000011 HC RX IP 250 OP 636: Performed by: HOSPITALIST

## 2020-10-27 PROCEDURE — 83880 ASSAY OF NATRIURETIC PEPTIDE: CPT | Performed by: EMERGENCY MEDICINE

## 2020-10-27 PROCEDURE — 99285 EMERGENCY DEPT VISIT HI MDM: CPT | Mod: 25

## 2020-10-27 RX ORDER — ASPIRIN 81 MG/1
81 TABLET ORAL DAILY
Status: DISCONTINUED | OUTPATIENT
Start: 2020-10-28 | End: 2020-10-30 | Stop reason: HOSPADM

## 2020-10-27 RX ORDER — BUDESONIDE 0.5 MG/2ML
0.5 INHALANT ORAL 2 TIMES DAILY
Status: DISCONTINUED | OUTPATIENT
Start: 2020-10-27 | End: 2020-10-30 | Stop reason: HOSPADM

## 2020-10-27 RX ORDER — HYDRALAZINE HYDROCHLORIDE 20 MG/ML
10 INJECTION INTRAMUSCULAR; INTRAVENOUS EVERY 4 HOURS PRN
Status: DISCONTINUED | OUTPATIENT
Start: 2020-10-27 | End: 2020-10-30 | Stop reason: HOSPADM

## 2020-10-27 RX ORDER — SIMVASTATIN 40 MG
40 TABLET ORAL AT BEDTIME
Status: DISCONTINUED | OUTPATIENT
Start: 2020-10-27 | End: 2020-10-30 | Stop reason: HOSPADM

## 2020-10-27 RX ORDER — NALOXONE HYDROCHLORIDE 0.4 MG/ML
.1-.4 INJECTION, SOLUTION INTRAMUSCULAR; INTRAVENOUS; SUBCUTANEOUS
Status: DISCONTINUED | OUTPATIENT
Start: 2020-10-27 | End: 2020-10-30 | Stop reason: HOSPADM

## 2020-10-27 RX ORDER — AMOXICILLIN 250 MG
1 CAPSULE ORAL 2 TIMES DAILY
Status: DISCONTINUED | OUTPATIENT
Start: 2020-10-27 | End: 2020-10-30 | Stop reason: HOSPADM

## 2020-10-27 RX ORDER — AMOXICILLIN 250 MG
2 CAPSULE ORAL 2 TIMES DAILY
Status: DISCONTINUED | OUTPATIENT
Start: 2020-10-27 | End: 2020-10-30 | Stop reason: HOSPADM

## 2020-10-27 RX ORDER — DABIGATRAN ETEXILATE 150 MG/1
150 CAPSULE ORAL 2 TIMES DAILY
Status: DISCONTINUED | OUTPATIENT
Start: 2020-10-27 | End: 2020-10-30 | Stop reason: HOSPADM

## 2020-10-27 RX ORDER — LIDOCAINE 40 MG/G
CREAM TOPICAL
Status: DISCONTINUED | OUTPATIENT
Start: 2020-10-27 | End: 2020-10-30 | Stop reason: HOSPADM

## 2020-10-27 RX ORDER — FUROSEMIDE 10 MG/ML
60 INJECTION INTRAMUSCULAR; INTRAVENOUS ONCE
Status: COMPLETED | OUTPATIENT
Start: 2020-10-27 | End: 2020-10-27

## 2020-10-27 RX ORDER — FUROSEMIDE 10 MG/ML
40 INJECTION INTRAMUSCULAR; INTRAVENOUS ONCE
Status: COMPLETED | OUTPATIENT
Start: 2020-10-28 | End: 2020-10-28

## 2020-10-27 RX ORDER — ALBUTEROL SULFATE 90 UG/1
2 AEROSOL, METERED RESPIRATORY (INHALATION) 4 TIMES DAILY PRN
Status: DISCONTINUED | OUTPATIENT
Start: 2020-10-27 | End: 2020-10-30 | Stop reason: HOSPADM

## 2020-10-27 RX ORDER — PANTOPRAZOLE SODIUM 40 MG/1
40 TABLET, DELAYED RELEASE ORAL
Status: DISCONTINUED | OUTPATIENT
Start: 2020-10-28 | End: 2020-10-30 | Stop reason: HOSPADM

## 2020-10-27 RX ADMIN — FUROSEMIDE 60 MG: 10 INJECTION, SOLUTION INTRAVENOUS at 19:47

## 2020-10-27 RX ADMIN — SIMVASTATIN 40 MG: 40 TABLET, FILM COATED ORAL at 22:54

## 2020-10-27 RX ADMIN — ALBUTEROL SULFATE 2 PUFF: 90 INHALANT RESPIRATORY (INHALATION) at 22:37

## 2020-10-27 RX ADMIN — DABIGATRAN ETEXILATE MESYLATE 150 MG: 150 CAPSULE ORAL at 22:54

## 2020-10-27 RX ADMIN — IPRATROPIUM BROMIDE AND ALBUTEROL 1 PUFF: 20; 100 SPRAY, METERED RESPIRATORY (INHALATION) at 23:56

## 2020-10-27 RX ADMIN — HYDRALAZINE HYDROCHLORIDE 10 MG: 20 INJECTION INTRAMUSCULAR; INTRAVENOUS at 22:54

## 2020-10-27 ASSESSMENT — ENCOUNTER SYMPTOMS
FEVER: 0
COUGH: 0
SHORTNESS OF BREATH: 1
CHILLS: 0

## 2020-10-27 NOTE — ED PROVIDER NOTES
History     Chief Complaint:  Shortness of Breath and lower extremity swelling       HPI     A phone  was used obtain the below history as well as to explain diagnosis, plan of treatment, reasons to return to the emergency department and appropriate follow up.    Shiraz Ingram is a 85 year old male who presents with shortness of breath and lower extremity swelling. The patient reported that he has has increased shortness of breath and bilateral lower extremity swelling. He has not identified any alleviating or exacerbating factors, as he can feel shortness of breath while he is moving around or laying flat. He denied any fever, chills, cough, or known sick contacts. He lives with his son.     Allergies:  No known allergies      Medications:    Albuterol   Aspirin  Pulmicort   Calcium carbonate   Pradaxa   Iron  Lasix   Lisinopril   Meclizine   Melatonin  Metoprolol tartrate   Singulair   Nitroglycerin  Pantoprazole   Potassium chloride   Simvastatin       Past Medical History:    Anemia   Aortic stenosis  Neoplasm of colon  Chronic kidney disease   Coronary artery disease   Congestive heart failure   Hyperlipidemia   Hypertension   Osteoarthritis   Asthma   Atrial fibrillation   Respiratory failure      Past Surgical History:    Coronary artery bypass  CV angiogram   CV right heart cath   Aortic valve replacement   Pacemaker   Esophagoscopy  Herniorrhaphy inguinal   laparoscopic cholecystectomy      Family History:    Father: coronary artery disease   Mother: breast cancer      Social History:  Smoking Status: Former smoker   Smokeless Tobacco: Never Used  Alcohol Use: Yes  Drug Use: No  PCP: Dany Rodriguez  Marital Status:   [2]     Review of Systems   Constitutional: Negative for chills and fever.   Respiratory: Positive for shortness of breath. Negative for cough.    Cardiovascular: Positive for leg swelling.   All other systems reviewed and are negative.      Physical Exam      Patient Vitals for the past 24 hrs:   BP Temp Temp src Pulse Resp SpO2   10/27/20 1720 (!) 205/90 -- -- -- -- --   10/27/20 1719 (!) 217/84 -- -- -- -- --   10/27/20 1718 -- 96.9  F (36.1  C) Temporal 60 18 92 %       Physical Exam  Constitutional: Alert, attentive, GCS 15  HENT:    Nose: Nose normal.    Mouth/Throat: Oropharynx is clear, mucous membranes are moist.   Eyes: EOM are normal, anicteric, conjugate gaze  CV: regular rate and rhythm; no murmurs  Chest: Effort normal and breath sounds clear without wheezing or rales, symmetric bilaterally   GI:  non tender. No distension. No guarding or rebound.    MSK: 2+ lower extremity edema, no tenderness to palpation of BLE.  Neurological: Alert, attentive, moving all extremities equally.   Skin: Skin is warm and dry.    Emergency Department Course   ECG:  Indication: Shortness of breath and Cough  Time: 1851  Vent. Rate 78 bpm. WI interval *. QRS duration 130. QT/QTc 430/490. P-R-T axis * -51 24.  Atrial fibrillation with premature ventricular or aberrantly conducted complexes. Right bundle branch block. Left anterior fascicular block. Bifascicular block. Inferior infarct, age undetermined. Abnormal ECG. No significant change compared to EKG dated 9/28/2020. Read time: 1855    Imaging:  Radiographic findings were communicated with the patient who voiced understanding of the findings.    XR Chest 1 view PORT:   Single portable AP view of the chest was obtained. Post   surgical changes of prosthetic aortic valve replacement with median   sternotomy wires and surgical clips. Left chest wall cardiac pacer is   also noted. Stable mild enlargement of the cardiac silhouette. Right   basilar airspace pulmonary opacities, worrisome for infection. No   significant pleural effusion or pneumothorax, as per radiology.      Laboratory:    Asymptomatic COVID-19 Virus PCR Nasopharyngeal swab: pending     CBC: WBC: 3.4 (L), HGB: 10.2 (L), PLT: 105 (L)   BMP: Creatinine: 1.32 (H),  Urea Nitrogen: 31 (H), GFR: 49 (L), o/w WNL     Troponin (): 0.032  BNP: 9974 (H)   Procalcitonin: <0.05    Interventions:   Furosemide 60 mg IV     Emergency Department Course:  Nursing notes and vitals reviewed. () I performed an exam of the patient as documented above.     IV inserted. Medicine administered as documented above. Blood drawn. Covid-19 swab collected. This was sent to the lab for further testing, results above.    The patient was sent for a chest XR while in the emergency department, findings above.   EKG obtained in the ED, see results above.     ()  I consulted with Dr. Falk of the hospitalist services. They are in agreement to accept the patient for admission.    Findings and plan explained to the Patient who consents to admission. Discussed the patient with Dr. Falk, who will admit the patient to a [facility] bed for further monitoring, evaluation, and treatment.      Impression & Plan    Covid-19  Shiraz Ingram was evaluated during a global COVID-19 pandemic, which necessitated consideration that the patient might be at risk for infection with the SARS-CoV-2 virus that causes COVID-19.   Applicable protocols for evaluation were followed during the patient's care.   COVID-19 was considered as part of the patient's evaluation. The plan for testing is:  a test was obtained during this visit.    Medical Decision Makin-year-old male past medical history significant for COPD, coronary disease, CKD, CHF, A. fib not on anticoagulation presenting for gradually increasing exertional shortness of breath and bipedal edema who sleeps on one pillow with a towel under his legs.  He has had some intermittent chest pain associated with this but no clear exacerbating or alleviating factors.  EKG here shows A. fib with his bifascicular block but no acute changes.  Troponin is detectable but negative and he has no pain here in the emergency department.  His weight is 6 pounds up from his last  hospitalization at the beginning this month for pneumonia and he currently denies any fevers or chills or coughing up any sputum.  His BNP is elevated almost 10,000, chest x-ray shows questionable right lower lobe pneumonia however pro-Rajiv is normal and I suspect this is persistent from his previous infection.  Antibiotics deferred, he was given 60 mg IV Lasix and will admit to medicine for continued medical management of likely CHF exacerbation.  No Covid symptoms.    Diagnosis:    ICD-10-CM    1. Acute congestive heart failure, unspecified heart failure type (H)  I50.9 Asymptomatic COVID-19 Virus (Coronavirus) by PCR   2. History of transcatheter aortic valve replacement (TAVR)  Z95.2        Disposition:  Admitted to Dr. Dileep Cisneros MD  [employer]  11:13 PM 10/27/20     Scribe Disclosure:  I, Shelby Roberts, am serving as a scribe on 10/27/2020 at 6:47 PM to personally document services performed by Shiraz Cisneros MD based on my observations and the provider's statements to me.     Shelby Roberts  10/27/2020   [facility] EMERGENCY DEPT       Shiraz Cisneros MD  10/27/20 9217

## 2020-10-27 NOTE — PROGRESS NOTES
Subjective     Shiraz Ingram is a 85 year old male who presents to clinic today for the following health issues:    HPI         Patient presents with SOB starting 2 weeks ago, wakes him up at night and has a cough at night time.  Yellow productive cough.     Patient also states some tailbone pain    Patient is here with his son and     Per patient he is having pain with breathing.  Feels his breathing is getting worse.  Lying down he is also having cough.  Having some tailbone pain.  He constantly feels there is a mucus in his throat.  Hard to get up from lying down position.  Feels this is significantly worse than when he was hospitalized last time.  Not having any fever.  He does not think he has had a Covid as his last Covid test was negative.    Social History     Occupational History     Occupation: [employer]     Employer: RETIRED   Tobacco Use     Smoking status: Former Smoker     Types: Cigarettes     Smokeless tobacco: Never Used     Tobacco comment: smoked for a week in 20's   Substance and Sexual Activity     Alcohol use: Yes     Alcohol/week: 0.0 standard drinks     Drug use: No     Sexual activity: Yes     Partners: Female     No Known Allergies  Patient Active Problem List   Diagnosis     Coronary atherosclerosis     Benign neoplasm of colon     Localized osteoarthrosis, lower leg     Allergic rhinitis     Moderate persistent asthma     Anemia     Osteoporosis     Hypertension goal BP (blood pressure) < 140/90     Hyperlipidemia with target LDL less than 100     CKD (chronic kidney disease) stage 3, GFR 30-59 ml/min     Nonrheumatic aortic valve stenosis     Advanced directives, counseling/discussion     Iron deficiency anemia     Vitamin B 12 deficiency     Health Care Home     Mild persistent asthma without complication     Acute respiratory failure (H)     Acute on chronic congestive heart failure, unspecified heart failure type (H)     Severe aortic stenosis     Coronary  artery disease involving native coronary artery of native heart without angina pectoris     S/P CABG (coronary artery bypass graft)     Hyperkalemia     GI bleed     Pneumonia     Atrial fibrillation (H)     Thrombus of left atrial appendage     Complete heart block (H)     Aortic stenosis, severe     Acute exacerbation of CHF (congestive heart failure) (H)     Community acquired pneumonia     Acute congestive heart failure, unspecified heart failure type (H)     History of transcatheter aortic valve replacement (TAVR)     Reviewed medications, social history and  past medical and surgical history.    Review of system: for general, respiratory, CVS, GI and psychiatry negative except for noted above.     EXAM:  BP (!) 168/82   Pulse 74   Temp 97.9  F (36.6  C) (Temporal)   Resp 18   Wt 69.4 kg (153 lb)   SpO2 94%   BMI 23.26 kg/m    Constitutional: Frail, alert, in mild distress  Psychiatric: mentation appears normal and affect normal/bright   Lungs -decreased air entry both lung field, no crackles    ASSESSMENT / PLAN:  (R06.02) Shortness of breath  (primary encounter diagnosis)  Comment: With his history of recent pneumonia, history of asthma, history of heart failure.  Plan: I suspect worsening of either of above.  He does seem to be in distress and I recommended he should be going to the emergency room for further evaluation.  His son is going to drive him to the emergency room right now.  They are planning to go to [facility].  ER has been notified.      The above note was dictated using voice recognition. Although reviewed after completion, some word and grammatical error may remain .

## 2020-10-28 ENCOUNTER — APPOINTMENT (OUTPATIENT)
Dept: CARDIOLOGY | Facility: CLINIC | Age: 85
DRG: 291 | End: 2020-10-28
Attending: HOSPITALIST
Payer: COMMERCIAL

## 2020-10-28 LAB
ANION GAP SERPL CALCULATED.3IONS-SCNC: 10 MMOL/L (ref 3–14)
ANION GAP SERPL CALCULATED.3IONS-SCNC: 11 MMOL/L (ref 3–14)
BUN SERPL-MCNC: 30 MG/DL (ref 7–30)
BUN SERPL-MCNC: 30 MG/DL (ref 7–30)
CALCIUM SERPL-MCNC: 9.6 MG/DL (ref 8.5–10.1)
CALCIUM SERPL-MCNC: 9.8 MG/DL (ref 8.5–10.1)
CHLORIDE SERPL-SCNC: 104 MMOL/L (ref 94–109)
CHLORIDE SERPL-SCNC: 105 MMOL/L (ref 94–109)
CO2 SERPL-SCNC: 23 MMOL/L (ref 20–32)
CO2 SERPL-SCNC: 23 MMOL/L (ref 20–32)
CREAT SERPL-MCNC: 1.33 MG/DL (ref 0.66–1.25)
CREAT SERPL-MCNC: 1.39 MG/DL (ref 0.66–1.25)
ERYTHROCYTE [DISTWIDTH] IN BLOOD BY AUTOMATED COUNT: 19.2 % (ref 10–15)
GFR SERPL CREATININE-BSD FRML MDRD: 46 ML/MIN/{1.73_M2}
GFR SERPL CREATININE-BSD FRML MDRD: 48 ML/MIN/{1.73_M2}
GLUCOSE SERPL-MCNC: 77 MG/DL (ref 70–99)
GLUCOSE SERPL-MCNC: 85 MG/DL (ref 70–99)
HCT VFR BLD AUTO: 34.3 % (ref 40–53)
HGB BLD-MCNC: 10.6 G/DL (ref 13.3–17.7)
INTERPRETATION ECG - MUSE: NORMAL
MAGNESIUM SERPL-MCNC: 1.7 MG/DL (ref 1.6–2.3)
MCH RBC QN AUTO: 21 PG (ref 26.5–33)
MCHC RBC AUTO-ENTMCNC: 30.9 G/DL (ref 31.5–36.5)
MCV RBC AUTO: 68 FL (ref 78–100)
PHOSPHATE SERPL-MCNC: 4.2 MG/DL (ref 2.5–4.5)
PHOSPHATE SERPL-MCNC: 4.8 MG/DL (ref 2.5–4.5)
PLATELET # BLD AUTO: 107 10E9/L (ref 150–450)
POTASSIUM SERPL-SCNC: 3.8 MMOL/L (ref 3.4–5.3)
POTASSIUM SERPL-SCNC: 4.1 MMOL/L (ref 3.4–5.3)
POTASSIUM SERPL-SCNC: 4.5 MMOL/L (ref 3.4–5.3)
RBC # BLD AUTO: 5.04 10E12/L (ref 4.4–5.9)
SARS-COV-2 RNA SPEC QL NAA+PROBE: NOT DETECTED
SODIUM SERPL-SCNC: 138 MMOL/L (ref 133–144)
SODIUM SERPL-SCNC: 138 MMOL/L (ref 133–144)
SPECIMEN SOURCE: NORMAL
WBC # BLD AUTO: 4.3 10E9/L (ref 4–11)

## 2020-10-28 PROCEDURE — 85027 COMPLETE CBC AUTOMATED: CPT | Performed by: HOSPITALIST

## 2020-10-28 PROCEDURE — 80048 BASIC METABOLIC PNL TOTAL CA: CPT | Performed by: INTERNAL MEDICINE

## 2020-10-28 PROCEDURE — 250N000011 HC RX IP 250 OP 636: Performed by: HOSPITALIST

## 2020-10-28 PROCEDURE — 250N000013 HC RX MED GY IP 250 OP 250 PS 637: Performed by: INTERNAL MEDICINE

## 2020-10-28 PROCEDURE — 93321 DOPPLER ECHO F-UP/LMTD STD: CPT | Mod: 26 | Performed by: INTERNAL MEDICINE

## 2020-10-28 PROCEDURE — 99233 SBSQ HOSP IP/OBS HIGH 50: CPT | Performed by: INTERNAL MEDICINE

## 2020-10-28 PROCEDURE — 258N000003 HC RX IP 258 OP 636: Performed by: INTERNAL MEDICINE

## 2020-10-28 PROCEDURE — 250N000011 HC RX IP 250 OP 636: Performed by: INTERNAL MEDICINE

## 2020-10-28 PROCEDURE — 80048 BASIC METABOLIC PNL TOTAL CA: CPT | Performed by: HOSPITALIST

## 2020-10-28 PROCEDURE — 99222 1ST HOSP IP/OBS MODERATE 55: CPT | Mod: 25 | Performed by: INTERNAL MEDICINE

## 2020-10-28 PROCEDURE — 250N000013 HC RX MED GY IP 250 OP 250 PS 637: Performed by: HOSPITALIST

## 2020-10-28 PROCEDURE — 84132 ASSAY OF SERUM POTASSIUM: CPT | Performed by: INTERNAL MEDICINE

## 2020-10-28 PROCEDURE — 94640 AIRWAY INHALATION TREATMENT: CPT

## 2020-10-28 PROCEDURE — 83735 ASSAY OF MAGNESIUM: CPT | Performed by: INTERNAL MEDICINE

## 2020-10-28 PROCEDURE — 84100 ASSAY OF PHOSPHORUS: CPT | Performed by: HOSPITALIST

## 2020-10-28 PROCEDURE — 999N000157 HC STATISTIC RCP TIME EA 10 MIN

## 2020-10-28 PROCEDURE — 210N000002 HC R&B HEART CARE

## 2020-10-28 PROCEDURE — 36415 COLL VENOUS BLD VENIPUNCTURE: CPT | Performed by: INTERNAL MEDICINE

## 2020-10-28 PROCEDURE — 250N000009 HC RX 250: Performed by: HOSPITALIST

## 2020-10-28 PROCEDURE — 93321 DOPPLER ECHO F-UP/LMTD STD: CPT

## 2020-10-28 PROCEDURE — 83735 ASSAY OF MAGNESIUM: CPT | Performed by: HOSPITALIST

## 2020-10-28 PROCEDURE — 93308 TTE F-UP OR LMTD: CPT | Mod: 26 | Performed by: INTERNAL MEDICINE

## 2020-10-28 PROCEDURE — 84100 ASSAY OF PHOSPHORUS: CPT | Performed by: INTERNAL MEDICINE

## 2020-10-28 PROCEDURE — 93325 DOPPLER ECHO COLOR FLOW MAPG: CPT | Mod: 26 | Performed by: INTERNAL MEDICINE

## 2020-10-28 PROCEDURE — 36415 COLL VENOUS BLD VENIPUNCTURE: CPT | Performed by: HOSPITALIST

## 2020-10-28 RX ORDER — LISINOPRIL 20 MG/1
20 TABLET ORAL DAILY
Status: DISCONTINUED | OUTPATIENT
Start: 2020-10-28 | End: 2020-10-30 | Stop reason: HOSPADM

## 2020-10-28 RX ORDER — FERROUS SULFATE 325(65) MG
325 TABLET ORAL DAILY
Status: DISCONTINUED | OUTPATIENT
Start: 2020-10-28 | End: 2020-10-30 | Stop reason: HOSPADM

## 2020-10-28 RX ORDER — METOPROLOL TARTRATE 25 MG/1
25 TABLET, FILM COATED ORAL 2 TIMES DAILY
Status: DISCONTINUED | OUTPATIENT
Start: 2020-10-28 | End: 2020-10-30 | Stop reason: HOSPADM

## 2020-10-28 RX ORDER — LISINOPRIL 10 MG/1
10 TABLET ORAL EVERY EVENING
Status: DISCONTINUED | OUTPATIENT
Start: 2020-10-28 | End: 2020-10-29

## 2020-10-28 RX ORDER — FUROSEMIDE 10 MG/ML
40 INJECTION INTRAMUSCULAR; INTRAVENOUS EVERY 12 HOURS
Status: DISCONTINUED | OUTPATIENT
Start: 2020-10-28 | End: 2020-10-29

## 2020-10-28 RX ORDER — MONTELUKAST SODIUM 10 MG/1
10 TABLET ORAL DAILY
Status: DISCONTINUED | OUTPATIENT
Start: 2020-10-28 | End: 2020-10-30 | Stop reason: HOSPADM

## 2020-10-28 RX ADMIN — METOPROLOL TARTRATE 25 MG: 25 TABLET, FILM COATED ORAL at 10:31

## 2020-10-28 RX ADMIN — METOPROLOL TARTRATE 25 MG: 25 TABLET, FILM COATED ORAL at 21:41

## 2020-10-28 RX ADMIN — LISINOPRIL 10 MG: 10 TABLET ORAL at 19:58

## 2020-10-28 RX ADMIN — LISINOPRIL 20 MG: 20 TABLET ORAL at 10:32

## 2020-10-28 RX ADMIN — IPRATROPIUM BROMIDE AND ALBUTEROL 1 PUFF: 20; 100 SPRAY, METERED RESPIRATORY (INHALATION) at 08:40

## 2020-10-28 RX ADMIN — DOCUSATE SODIUM 50 MG AND SENNOSIDES 8.6 MG 1 TABLET: 8.6; 5 TABLET, FILM COATED ORAL at 08:38

## 2020-10-28 RX ADMIN — FUROSEMIDE 40 MG: 10 INJECTION, SOLUTION INTRAVENOUS at 17:32

## 2020-10-28 RX ADMIN — IPRATROPIUM BROMIDE AND ALBUTEROL 1 PUFF: 20; 100 SPRAY, METERED RESPIRATORY (INHALATION) at 16:09

## 2020-10-28 RX ADMIN — FUROSEMIDE 40 MG: 10 INJECTION, SOLUTION INTRAVENOUS at 08:37

## 2020-10-28 RX ADMIN — ASPIRIN 81 MG: 81 TABLET, COATED ORAL at 08:38

## 2020-10-28 RX ADMIN — FERROUS SULFATE TAB 325 MG (65 MG ELEMENTAL FE) 325 MG: 325 (65 FE) TAB at 10:32

## 2020-10-28 RX ADMIN — MAGNESIUM SULFATE HEPTAHYDRATE 1 G: 500 INJECTION, SOLUTION INTRAMUSCULAR; INTRAVENOUS at 16:03

## 2020-10-28 RX ADMIN — IPRATROPIUM BROMIDE AND ALBUTEROL 1 PUFF: 20; 100 SPRAY, METERED RESPIRATORY (INHALATION) at 20:03

## 2020-10-28 RX ADMIN — DABIGATRAN ETEXILATE MESYLATE 150 MG: 150 CAPSULE ORAL at 19:59

## 2020-10-28 RX ADMIN — Medication 1 MG: at 21:41

## 2020-10-28 RX ADMIN — DABIGATRAN ETEXILATE MESYLATE 150 MG: 150 CAPSULE ORAL at 08:38

## 2020-10-28 RX ADMIN — PANTOPRAZOLE SODIUM 40 MG: 40 TABLET, DELAYED RELEASE ORAL at 06:44

## 2020-10-28 RX ADMIN — MONTELUKAST 10 MG: 10 TABLET, FILM COATED ORAL at 10:31

## 2020-10-28 RX ADMIN — PANTOPRAZOLE SODIUM 40 MG: 40 TABLET, DELAYED RELEASE ORAL at 16:08

## 2020-10-28 RX ADMIN — SIMVASTATIN 40 MG: 40 TABLET, FILM COATED ORAL at 21:41

## 2020-10-28 RX ADMIN — Medication 1 MG: at 04:07

## 2020-10-28 RX ADMIN — BUDESONIDE 0.5 MG: 0.5 INHALANT RESPIRATORY (INHALATION) at 19:07

## 2020-10-28 SDOH — HEALTH STABILITY: MENTAL HEALTH
STRESS IS WHEN SOMEONE FEELS TENSE, NERVOUS, ANXIOUS, OR CAN'T SLEEP AT NIGHT BECAUSE THEIR MIND IS TROUBLED. HOW STRESSED ARE YOU?: NOT ASKED

## 2020-10-28 SDOH — HEALTH STABILITY: PHYSICAL HEALTH: ON AVERAGE, HOW MANY DAYS PER WEEK DO YOU ENGAGE IN MODERATE TO STRENUOUS EXERCISE (LIKE A BRISK WALK)?: 4 DAYS

## 2020-10-28 SDOH — HEALTH STABILITY: PHYSICAL HEALTH: ON AVERAGE, HOW MANY MINUTES DO YOU ENGAGE IN EXERCISE AT THIS LEVEL?: 30 MIN

## 2020-10-28 ASSESSMENT — ACTIVITIES OF DAILY LIVING (ADL)
ADLS_ACUITY_SCORE: 14
ADLS_ACUITY_SCORE: 15

## 2020-10-28 NOTE — ED NOTES
[facility]  ED Nurse Handoff Report    ED Chief complaint: Shortness of Breath and Cough      ED Diagnosis:   Final diagnoses:   None       Code Status: Full Code    Allergies: No Known Allergies    Patient Story: Cough and SOB for two weeks. Concerned about bilateral leg swelling  Focused Assessment:  Alert and oriented x4. Sign language only. Independent with cares.    Labs Ordered and Resulted from Time of ED Arrival Up to the Time of Departure from the ED   CBC WITH PLATELETS DIFFERENTIAL - Abnormal; Notable for the following components:       Result Value    WBC 3.4 (*)     Hemoglobin 10.2 (*)     Hematocrit 32.6 (*)     MCV 68 (*)     MCH 21.3 (*)     MCHC 31.3 (*)     RDW 19.0 (*)     Platelet Count 105 (*)     Absolute Lymphocytes 0.5 (*)     All other components within normal limits   BASIC METABOLIC PANEL - Abnormal; Notable for the following components:    Urea Nitrogen 31 (*)     Creatinine 1.32 (*)     GFR Estimate 49 (*)     GFR Estimate If Black 56 (*)     All other components within normal limits   NT PROBNP INPATIENT - Abnormal; Notable for the following components:    N-Terminal Pro BNP Inpatient 9,974 (*)     All other components within normal limits   PROCALCITONIN   TROPONIN I     XR Chest Port 1 View   Final Result   IMPRESSION: Single portable AP view of the chest was obtained. Post   surgical changes of prosthetic aortic valve replacement with median   sternotomy wires and surgical clips. Left chest wall cardiac pacer is   also noted. Stable mild enlargement of the cardiac silhouette. Right   basilar airspace pulmonary opacities, worrisome for infection. No   significant pleural effusion or pneumothorax.      MARVA CLOUD MD            Treatments and/or interventions provided: Lasix  Patient's response to treatments and/or interventions: tolerated well    To be done/followed up on inpatient unit:  Continue with plan of care per admitting MD.    Does this patient have  any cognitive concerns?: N/A    Activity level - Baseline/Home:  Independent  Activity Level - Current:   Independent    Patient's Preferred language: American Sign Language   Needed?: Yes    Isolation: None  Infection: Not Applicable  Patient tested for COVID 19 prior to admission: YES  Bariatric?: No    Vital Signs:   Vitals:    10/27/20 1720 10/27/20 1900 10/27/20 1903 10/27/20 1952   BP: (!) 205/90      Pulse:  77 80 80   Resp:  24 22 29   Temp:       TempSrc:       SpO2:  94% 96% 93%       Cardiac Rhythm:Cardiac Rhythm: Normal sinus rhythm    Was the PSS-3 completed:   Yes  What interventions are required if any?               Family Comments: No family at bedside  OBS brochure/video discussed/provided to patient/family: Yes             For the majority of the shift this patient's behavior was Green.     ED NURSE PHONE NUMBER: *[phone]

## 2020-10-28 NOTE — CONSULTS
Care Management Initial Consult    General Information  Assessment completed with:: Shiraz Asher with the use of    Type of CM/SW Visit: Initial Assessment  Primary Care Provider verified and updated as needed?: Yes        Reason for Consult: care coordination/care conference, discharge planning  Advance Care Planning: Advance Care Planning Reviewed: no concerns identified  requested pt bring in a copy when able       Communication Assessment  Patient's communication style: spoken language (English or Bilingual)  Hearing Difficulty or Deaf: yes Wear Glasses or Blind: yes    Cognitive  Cognitive/Neuro/Behavioral: WDL                      Living Environment:   People in home: child(perla), adult     Current living Arrangements: house          Family/Social Support:  Care provided by: self  Provides care for: no one  Marital Status:   Who is your support system?: Wife, Children     Description of Support System: Supportive, Involved  Description of Support System: Supportive, Involved           Description of Support System: Supportive, Involved       Current Resources:   Skilled Home Care Services:  none  Community Resources: None  Equipment currently used at home: none  Supplies currently used at home:      Employment:  Employment Status: retired        Financial/Environmental Concerns: No concerns identified          Lifestyle & Psychosocial Needs:        Socioeconomic History     Marital status:      Spouse name: Kailey     Number of children: 3     Years of education: Not on file     Highest education level: Not on file   Occupational History     Occupation: [employer]     Employer: RETIRED     Tobacco Use     Smoking status: Former Smoker     Types: Cigarettes     Smokeless tobacco: Never Used     Tobacco comment: smoked for a week in 20's   Substance and Sexual Activity     Alcohol use: Yes     Alcohol/week: 0.0 standard drinks     Drug use: No     Sexual activity: Yes      Partners: Female       Functional Status:  Prior to admission patient needed assistance:          Mental Health Status:  WDL            Values/Beliefs:  Spiritual, Cultural Beliefs, Quaker Practices, Values that affect care: no               Additional Information:  Orders received due to elevated unplanned readmission risk score. Met with pt with the use of an .  Pt states he has no needs and is able to make his own appointments. He is aware cardiology may schedule appointments prior to discharge.  He states he still exercises every other day.  Will continue to follow along for further discharge planning.     Kay Ramos RN BAN  Inpatient Care Coordination  [address] [address]  [email or web address]   Office: [phone]  Fax: [phone]

## 2020-10-28 NOTE — PHARMACY-ADMISSION MEDICATION HISTORY
Pharmacy Medication History  Admission medication history interview status for the 10/27/2020 admission is complete. See EPIC admission navigator for prior to admission medications       Medication history sources: Patient  Location of interview: Patient room,  Medication history source reliability: Good though communication with pen and paper may have resulted in mis-interpretation of when most recent of BID doses were taken   Adherence assessment: Good    Additional medication history information:    was not available.  Med history collected with writing pad and a angie     Medication reconciliation completed by provider prior to medication history? Yes    Time spent in this activity: 15 minutes      Prior to Admission medications    Medication Sig Last Dose Taking? Auth Provider   albuterol (PROAIR HFA/PROVENTIL HFA/VENTOLIN HFA) 108 (90 Base) MCG/ACT inhaler Inhale 2 puffs into the lungs 4 times daily as needed for shortness of breath / dyspnea or wheezing 10/27/2020 at Unknown time Yes Reported, Patient   aspirin (ASA) 81 MG EC tablet Take 81 mg by mouth daily 10/27/2020 at AM Yes Reported, Patient   calcium carbonate 600 mg-vitamin D 400 units (CALTRATE) 600-400 MG-UNIT per tablet Take 1 tablet by mouth daily 10/27/2020 at AM Yes Unknown, Entered By History   dabigatran ANTICOAGULANT (PRADAXA) 150 MG capsule Take 1 capsule (150 mg) by mouth 2 times daily Store in original 's bottle or blister pack; use within 120 days of opening. 10/27/2020 at AM Yes Ryan Samuel MD   ferrous sulfate (IRON) 325 (65 Fe) MG tablet TAKE 1 TABLET(325 MG) BY MOUTH TWICE DAILY 10/27/2020 at x2 Yes Dany Rodriguez MD   ipratropium - albuterol 0.5 mg/2.5 mg/3 mL (DUONEB) 0.5-2.5 (3) MG/3ML nebulization Inhale 1 vial (3 mLs) into the lungs 4 times daily 10/27/2020 at Unknown time Yes Dany Rodriguez MD   lisinopril (ZESTRIL) 20 MG tablet Take 1 tablet (20 mg) by mouth daily  10/27/2020 at Unknown time Yes Dany Rodriguez MD   meclizine (ANTIVERT) 12.5 MG tablet Take 1 tablet (12.5 mg) by mouth 3 times daily as needed for dizziness  at PRN Yes Joon Morris MD   melatonin 3 MG tablet Take 1 tablet (3 mg) by mouth nightly as needed for sleep  at PRN Yes Nnamdi Sears MD   metoprolol tartrate (LOPRESSOR) 25 MG tablet Take 1 tablet (25 mg) by mouth 2 times daily 10/27/2020 at Unknown time Yes Dany Rodriguez MD   montelukast (SINGULAIR) 10 MG tablet TAKE 1 TABLET(10 MG) BY MOUTH DAILY 10/26/2020 at HS Yes Dany Rodriguez MD   multivitamin, therapeutic (THERA-VIT) TABS tablet Take 1 tablet by mouth every evening  10/26/2020 at PM Yes Unknown, Entered By History   nitroGLYcerin (NITROSTAT) 0.4 MG sublingual tablet For chest pain place 1 tablet under the tongue every 5 minutes for 3 doses. If symptoms persist 5 minutes after 1st dose call 911.  at PRN Yes Bozena Groves MD   pantoprazole (PROTONIX) 40 MG EC tablet TAKE ONE TABLET BY MOUTH TWICE A DAY BEFORE MEALS 10/27/2020 at Unknown time Yes Dany Rodriguez MD   simvastatin (ZOCOR) 40 MG tablet TAKE 1 TABLET(40 MG) BY MOUTH AT BEDTIME 10/26/2020 at HS Yes Dany Rodriguez MD   budesonide (PULMICORT) 0.5 MG/2ML nebulizer solution Take 2 mLs (0.5 mg) by nebulization 2 times daily  at AM  Dany Rodriguez MD

## 2020-10-28 NOTE — H&P
[facility]    History and Physical - Hospitalist Service       Date of Admission:  10/27/2020    Assessment & Plan   Shiraz Ingram is a 85 year old male with history of CKD, CAD, chronic diastolic heart failure, hyperlipidemia, hypertension, asthma, and atrial fibrillation on dabigatran who presented to the ED with worsening shortness of breath and edema for the past several days.  He is admitted to the heart center with probable heart failure exacerbation.    Shortness of breath and lower extremity edema  Probable acute on chronic heart failure exacerbation-history of HF PEF  History of aortic stenosis s/p TAVR in July 2020  Echo subsequent to TAVR in August of this year showed EF of 50 to 55%.  Patient reports several pound weight gain and increasing lower extremity edema.  He denies any chest pain or pressure or palpitations.  No infectious sounding symptoms.  EKG and troponin unremarkable.  BNP 9k.  - Status post 60 mg IV Lasix once in ED, will empirically give 40 IV tomorrow morning  - Telemetry monitoring for tonight  - Cardiology consultation  - Monitor daily weights and ins and outs  - Update echocardiogram    Hypertensive urgency on admission  Hypertension  Hyperlipidemia  Considerably hypertensive with systolics around 200 in ED initially.  IV Lasix given and will monitor response.  - PTA regimen awaiting confirmation-appears to be on lisinopril, Lasix, Toprol and simvastatin  - IV diuresis as above  - prn hydralazine    CKD  Creatinine 2.0 upon admission.  - Monitor especially with ongoing diuresis    Atrial fibrillation s/p pacemaker, on pradaxa  HR acceptable on admission.    - monitor tele overnight  - PTA rate control to resume when confirmed  - PTA pradaxa to continue with confirmed    Asthma  Stable upon admission.  - monitor, O2 as needed, prn inhalers/nebs    Asymptomatic COVID 19 screening obtained in ED  - pending      Diet:   2 gram sodium  DVT Prophylaxis: pta pradaxa  when confirmed  Pak Catheter: not present  Code Status:   DNR / DNI - confirmed upon admission         Disposition Plan   Expected discharge: 2-3 days pending work up, clinical course, and cardiology recs  Entered: Ender Falk MD 10/27/2020, 9:12 PM     The patient's care was discussed with the Patient, Patient's Family and ED MD.    Ender Falk MD  [facility]  Contact information available via [facility] Paging/Directory      ______________________________________________________________________    Chief Complaint   SOB and LE edema    History is obtained from the patient    History of Present Illness   Shiraz Ingram is a 85 year old male with history of CKD, CAD, chronic diastolic heart failure, hyperlipidemia, hypertension, asthma, and atrial fibrillation on dabigatran who presented to the ED with worsening shortness of breath and edema for the past several days.  This appears to have progressively came on and he denies any chest pain or pressure.  He said very minimal cough only with positional change and a slight tickle in his throat.  He denies fevers, chills, sore throat, loss of taste or smell, or sick contacts.  He lives with the son and has not traveled recently.  He reports taking his medications as directed.  He denies any abdominal pain, nausea, vomiting, diarrhea.  Of note he did have a TAVR in July of this year.    In the ED patient was seen by Dr. Cisneros with whom I discussed case.  Patient is afebrile, quite hypertensive with systolics in the 200 range, and saturating normally on room air.  Labs show BUN of 31 creatinine 1.32 which appears to be somewhat near his baseline, WBC was 3.4, Hgb 10.2, .  BNP was elevated 9000 which is higher than his had from what I can see in the past.  EKG and troponin were not indicative of acute ischemic event.  He has been given 60 mg IV Lasix with seemingly good response so far.  Asymptomatic COVID-19 screening has been  obtained patient will be admitted to the heart center for further monitoring and management.    Review of Systems    The 10 point Review of Systems is negative other than noted in the HPI or here.     Past Medical History    I have reviewed this patient's medical history and updated it with pertinent information if needed.   Past Medical History:   Diagnosis Date     Allergic rhinitis, cause unspecified      Anemia, unspecified      Aortic stenosis     severe     Benign neoplasm of colon 1999    Ademonatous polyp     CKD (chronic kidney disease) stage 3, GFR 30-59 ml/min 5/12/2011     Coronary atherosclerosis of unspecified type of vessel, native or graft     s/p CABG 2002     Hyperlipidemia LDL goal < 100      Hypertension goal BP (blood pressure) < 140/90      Localized osteoarthrosis not specified whether primary or secondary, lower leg     left knee     Moderate persistent asthma      Persistent atrial fibrillation (H)      Unspecified hearing loss        Past Surgical History   I have reviewed this patient's surgical history and updated it with pertinent information if needed.  Past Surgical History:   Procedure Laterality Date     CARDIAC SURGERY       COLONOSCOPY N/A 5/26/2020    Procedure: COLONOSCOPY;  Surgeon: Ignacio Fournier MD;  Location:  GI     CV ANGIOGRAM CORONARY GRAFT N/A 4/24/2020    Procedure: Angiogram Coronary Graft;  Surgeon: Addison Willard MD;  Location:  HEART CARDIAC CATH LAB     CV CORONARY ANGIOGRAM N/A 4/24/2020    Procedure: Coronary Angiogram;  Surgeon: Addison Willard MD;  Location:  HEART CARDIAC CATH LAB     CV RIGHT HEART CATH N/A 4/24/2020    Procedure: Right Heart Cath;  Surgeon: Addison Willard MD;  Location:  HEART CARDIAC CATH LAB     CV TRANSCATHETER AORTIC VALVE REPLACEMENT N/A 7/14/2020    Procedure: Transcatheter Aortic Valve Replacement;  Surgeon: Soraida Monet MD;  Location:  HEART CARDIAC CATH LAB     EP PACEMAKER N/A  7/15/2020    Procedure: EP Pacemaker;  Surgeon: Tommie Sauer MD;  Location:  HEART CARDIAC CATH LAB     HC COLONOSCOPY THRU STOMA W BIOPSY/CAUTERY TUMOR/POLYP/LESION      Dr. Dow, Q 5 years     HC COLONOSCOPY THRU STOMA W BIOPSY/CAUTERY TUMOR/POLYP/LESION      Dr. Dow, one adenomatous polyp     HC ESOPHAGOSCOPY, DIAGNOSTIC      Dr. Dow, lower esophageal ring & mild gastritis     HERNIORRHAPHY INGUINAL Right 2016    Procedure: HERNIORRHAPHY INGUINAL;  Surgeon: Alexis Alexandra MD;  Location: [facility]     LAPAROSCOPIC CHOLECYSTECTOMY      for biliary sludge     ZZC NONSPECIFIC PROCEDURE      Coronary artery bypass       Social History   I have reviewed this patient's social history and updated it with pertinent information if needed.  Social History     Tobacco Use     Smoking status: Former Smoker     Types: Cigarettes     Smokeless tobacco: Never Used     Tobacco comment: smoked for a week in 20's   Substance Use Topics     Alcohol use: Yes     Alcohol/week: 0.0 standard drinks     Drug use: No       Family History   I have reviewed this patient's family history and updated it with pertinent information if needed.  Family History   Problem Relation Age of Onset     C.A.D. Father      Cancer Mother         breast cancer,  of pneumonia     Cerebrovascular Disease Son      CABG Son      Family History Negative Child      Family History Negative Sister      Heart Disease Brother        Prior to Admission Medications   Prior to Admission Medications   Prescriptions Last Dose Informant Patient Reported? Taking?   albuterol (PROAIR HFA/PROVENTIL HFA/VENTOLIN HFA) 108 (90 Base) MCG/ACT inhaler  Self Yes No   Sig: Inhale 2 puffs into the lungs 4 times daily as needed for shortness of breath / dyspnea or wheezing   aspirin (ASA) 81 MG EC tablet  Self Yes No   Sig: Take 81 mg by mouth daily   budesonide (PULMICORT) 0.5 MG/2ML nebulizer solution  Self No No   Sig: Take 2 mLs  (0.5 mg) by nebulization 2 times daily   calcium carbonate 600 mg-vitamin D 400 units (CALTRATE) 600-400 MG-UNIT per tablet  Self Yes No   Sig: Take 1 tablet by mouth daily   dabigatran ANTICOAGULANT (PRADAXA) 150 MG capsule  Self No No   Sig: Take 1 capsule (150 mg) by mouth 2 times daily Store in original 's bottle or blister pack; use within 120 days of opening.   ferrous sulfate (IRON) 325 (65 Fe) MG tablet  Self No No   Sig: TAKE 1 TABLET(325 MG) BY MOUTH TWICE DAILY   ipratropium - albuterol 0.5 mg/2.5 mg/3 mL (DUONEB) 0.5-2.5 (3) MG/3ML nebulization  Self No No   Sig: Inhale 1 vial (3 mLs) into the lungs 4 times daily   lisinopril (ZESTRIL) 20 MG tablet   No No   Sig: Take 1 tablet (20 mg) by mouth daily   meclizine (ANTIVERT) 12.5 MG tablet   No No   Sig: Take 1 tablet (12.5 mg) by mouth 3 times daily as needed for dizziness   melatonin 3 MG tablet   No No   Sig: Take 1 tablet (3 mg) by mouth nightly as needed for sleep   metoprolol tartrate (LOPRESSOR) 25 MG tablet   No No   Sig: Take 1 tablet (25 mg) by mouth 2 times daily   metoprolol tartrate (LOPRESSOR) 25 MG tablet   No No   Sig: Take 1 tablet (25 mg) by mouth 2 times daily   montelukast (SINGULAIR) 10 MG tablet  Self No No   Sig: TAKE 1 TABLET(10 MG) BY MOUTH DAILY   multivitamin, therapeutic (THERA-VIT) TABS tablet  Self Yes No   Sig: Take 1 tablet by mouth every evening    nitroGLYcerin (NITROSTAT) 0.4 MG sublingual tablet   No No   Sig: For chest pain place 1 tablet under the tongue every 5 minutes for 3 doses. If symptoms persist 5 minutes after 1st dose call 911.   pantoprazole (PROTONIX) 40 MG EC tablet   No No   Sig: TAKE ONE TABLET BY MOUTH TWICE A DAY BEFORE MEALS   simvastatin (ZOCOR) 40 MG tablet  Self No No   Sig: TAKE 1 TABLET(40 MG) BY MOUTH AT BEDTIME      Facility-Administered Medications: None     Allergies   No Known Allergies    Physical Exam   Vital Signs: Temp: 96.9  F (36.1  C) Temp src: Temporal BP: (!) 205/90 Pulse:  80   Resp: 29 SpO2: 93 % O2 Device: None (Room air)    Weight: 0 lbs 0 oz    Gen: NAD, pleasant, [place], used wipe off board to communicate  HEENT: Normocephalic, EOMI, MMM  Resp: no focal crackles,  no wheezes, no increased work of resp  CV: S1S2 heard, reg rhythm, reg rate, 2+ pitting bilateral pedal edema  Abdo: soft, nontender, nondistended, bowel sounds present  Ext: calves nontender, well perfused  Neuro: AAOx3, CN grossly intact, no facial asymmetry      Data   Data reviewed today: I reviewed all medications, new labs and imaging results over the last 24 hours. I personally reviewed no images or EKG's today.    Recent Labs   Lab 10/27/20  1812   WBC 3.4*   HGB 10.2*   MCV 68*   *      POTASSIUM 4.8   CHLORIDE 107   CO2 24   BUN 31*   CR 1.32*   ANIONGAP 7   AYALA 9.4   GLC 96   TROPI 0.032     Recent Results (from the past 24 hour(s))   XR Chest Port 1 View    Narrative    CHEST ONE VIEW PORTABLE    10/27/2020 6:30 PM     HISTORY: Pneumonia    COMPARISON: CT chest abdomen pelvis on 9/28/2020.      Impression    IMPRESSION: Single portable AP view of the chest was obtained. Post  surgical changes of prosthetic aortic valve replacement with median  sternotomy wires and surgical clips. Left chest wall cardiac pacer is  also noted. Stable mild enlargement of the cardiac silhouette. Right  basilar airspace pulmonary opacities, worrisome for infection. No  significant pleural effusion or pneumothorax.    MARVA CLOUD MD

## 2020-10-28 NOTE — PROVIDER NOTIFICATION
"10/27/20  11:53 PM        Admitting hospitalist text paged with the following message:     \"Pt reports left lower leg and knee pain. Not warm or extra sensitive to palpation. On [place]. Legs elevated on pillows.\"      Addendum:   Update paged:   \"Update: pt reports leg pain felt like a cramp and no longer hurts.\"          Addendum:  10/28/20  0110     Cross-cover hospitalist paged: pt having significant muscle cramping after receiving Lasix. He has had 1475 mL of urine output. Request for electrolyte labs.    Order received for stat metabolic panel  "

## 2020-10-28 NOTE — PROGRESS NOTES
[facility]    Medicine Progress Note - Hospitalist Service       Date of Admission:  10/27/2020  Assessment & Plan        Shiraz Ingram is a 85 year old male with history of CKD, CAD, chronic diastolic heart failure, hyperlipidemia, hypertension, asthma, and atrial fibrillation on dabigatran who presented to the ED with worsening shortness of breath and edema for the past several days.  He is admitted to the heart center with probable heart failure exacerbation.     Shortness of breath and lower extremity edema  Probable acute on chronic heart failure exacerbation-history of HF PEF  History of aortic stenosis s/p TAVR in July 2020  Echo subsequent to TAVR in August of this year showed EF of 50 to 55%.  Patient reports several pound weight gain and increasing lower extremity edema.  He denies any chest pain or pressure or palpitations.  No infectious sounding symptoms.  EKG and troponin unremarkable.  BNP 9k.  Last hospital admission 9/28-10/1 for pneumonia and also RD.  At the time of his discharge lasix was discontinued.    - received Lasix 60mg x1 last night with 2.2L urine output. Received lasix 40mg IV x 1 this morning.   - echo pending  - restart PTA lisinopril and Metoprolol  - cardiology consult   - compression stockings for edema  - Telemetry monitoring   - Monitor daily weights and ins and outs    Leg cramps  Started last night. Likely due to diuresis and fluid shift. K, mag and phos are normal range.   - has already received lasix this morning. Recheck lytes again this afternoon.   - monitor, ambulate with staff as tolerated    Hypertensive urgency on admission, improving  Hypertension  Hyperlipidemia  Considerably hypertensive with systolics around 200 in ED initially.   - restart PTA Lisinopril an dBB  - continue with statin  - diuresis as above     CKD-III  Historically creatinine ranging 1.4-1.5. recent RD end of September with cr peak at 2. Diuretics was discontinued at that  time. Cr. On admission 1.32  - cr 1.39 this morning  - monitor with diuresis    Atrial fibrillation s/p pacemaker, on pradaxa  HR acceptable on admission.    - PTA metoprolol continued  - PTA pradaxa to continue      Asthma  Stable upon admission.  - resume PTA inhaler  - resume PTA nebs once confirmed negative COVID screen     Asymptomatic COVID 19 screening obtained in ED  - pending        Diet: Combination Diet Regular Diet Adult; 2 gm NA Diet    DVT Prophylaxis: Pradaxa  Pak Catheter: not present  Code Status: No CPR- Do NOT Intubate           Disposition Plan   Expected discharge: 2 - 3 days, recommended to prior living arrangement once adequte diruesis, stable/improved respiratory status.  Entered: Eleanor Burt MD 10/28/2020, 8:42 AM       The patient's care was discussed with the Patient.    Eleanor Burt MD  Hospitalist Service  [facility]  Contact information available via [facility] Paging/Directory    ______________________________________________________________________    Interval History    Sign language interpretor present.   Patient reports leg cramps from knee down.   Feels breathing is a little better and edema also improving. Denies n/v.   Endorses infrequent cough. Denies fever or chills. Denies sick contacts.     Data reviewed today: I reviewed all medications, new labs and imaging results over the last 24 hours. I personally reviewed no images or EKG's today.    Physical Exam   Vital Signs: Temp: 97.6  F (36.4  C) Temp src: Oral BP: (!) 140/58 Pulse: 84   Resp: 22 SpO2: 93 % O2 Device: None (Room air)    Weight: 154 lbs 4.8 oz  General Appearance: Alert, awake and no apparent distress  Respiratory: crackles in the left base, diminished in right lower lung, upper lungs clear, no wheezing  Cardiovascular: regular rate and rhythm, 2+LE pitting edema  GI: soft and non-tender  Skin: warm and dry      Data   Recent Labs   Lab 10/28/20  0540 10/28/20  0143  10/27/20  1812   WBC 4.3  --  3.4*   HGB 10.6*  --  10.2*   MCV 68*  --  68*   *  --  105*    138 138   POTASSIUM 4.1 3.8 4.8   CHLORIDE 104 105 107   CO2 23 23 24   BUN 30 30 31*   CR 1.39* 1.33* 1.32*   ANIONGAP 11 10 7   AYALA 9.6 9.8 9.4   GLC 77 85 96   TROPI  --   --  0.032     Recent Results (from the past 24 hour(s))   XR Chest Port 1 View    Narrative    CHEST ONE VIEW PORTABLE    10/27/2020 6:30 PM     HISTORY: Pneumonia    COMPARISON: CT chest abdomen pelvis on 9/28/2020.      Impression    IMPRESSION: Single portable AP view of the chest was obtained. Post  surgical changes of prosthetic aortic valve replacement with median  sternotomy wires and surgical clips. Left chest wall cardiac pacer is  also noted. Stable mild enlargement of the cardiac silhouette. Right  basilar airspace pulmonary opacities, worrisome for infection. No  significant pleural effusion or pneumothorax.    MARVA CLOUD MD     Medications     - MEDICATION INSTRUCTIONS -         aspirin  81 mg Oral Daily     budesonide  0.5 mg Nebulization BID     dabigatran ANTICOAGULANT  150 mg Oral BID     ferrous sulfate  325 mg Oral Daily     ipratropium-albuterol  1 puff Inhalation 4x daily     lisinopril  20 mg Oral Daily     metoprolol tartrate  25 mg Oral BID     montelukast  10 mg Oral Daily     pantoprazole  40 mg Oral BID AC     senna-docusate  1 tablet Oral BID    Or     senna-docusate  2 tablet Oral BID     simvastatin  40 mg Oral At Bedtime     sodium chloride (PF)  3 mL Intracatheter Q8H

## 2020-10-28 NOTE — PROGRESS NOTES
RECEIVING UNIT ED HANDOFF REVIEW    ED Nurse Handoff Report was reviewed by: Elly Mac RN on October 27, 2020 at 9:36 PM

## 2020-10-28 NOTE — CONSULTS
[facility]    Cardiology Consultation     Date of Admission:  10/27/2020    Assessment & Plan   Shiraz Ingram is a 85 year old male who was admitted on 10/27/2020.    1.  Heart failure with preserved ejection fraction  Patient symptoms are consistent with congestive heart failure.  He has leg edema, chest x-ray showing pulmonary congestion as well as mildly positive hepatojugular reflux.  Agree with intravenous Lasix.  He will need to be on home diuretics.  I would choose Demadex at 10 or 20 mg daily depending on how he is doing  Repeat echocardiogram should be considered.  Last echo in July revealed normal ejection fraction with appropriately functioning bioprosthetic aortic valve.  Given recurrent admission congestive heart failure, he will benefit from core clinic follow-up.  Anticipate stay in 1 to 2 days and discharged later.  Comorbidities include chronic insufficiency and anemia of chronic disease.  We will check magnesium due to cramping.    2.  History of coronary artery disease with previous bypass surgery  No angina.    3.  Hypertension  On lisinopril as well as metoprolol.  If blood pressure remains high, lisinopril dose can be increased with close follow-up of BMP.    4.  Hyperlipidemia, continue simvastatin    5.  Chronic atrial fibrillation with PVCs, on Pradaxa.  Increase metoprolol as tolerated for better rate control.    Ashkan Villar MD    Primary Care Physician   Dany Rodriguez MD    Reason for Consult   Reason for consult: I was asked by hospitalist to evaluate this patient for congestive heart failure.    History of Present Illness   Shiraz Ingram is a 85 year old male with history of CKD, CAD, chronic diastolic heart failure, hyperlipidemia, hypertension, asthma, and chronic atrial fibrillation on dabigatran who presented to the ED with worsening shortness of breath and edema for the past several days.    Patient tells me that it has been progressively  getting worse over the last 2 to 3 weeks.  There has been no change in diet.  He does not add salt to his diet.  He started noticing swelling and shortness of breath with some orthopnea.  There is no chest pain.   He said very minimal cough only with positional change and a slight tickle in his throat.  He denies fevers, chills, sore throat, loss of taste or smell, or sick contacts.  He lives with the son and has not traveled recently.  He reports taking his medications as directed.  He denies any abdominal pain, nausea, vomiting, diarrhea.  Of note he did have a TAVR in July of this year.    He has previous admission for congestive heart failure.  However he is not on any diuretics at home.  Does have chronic renal insufficiency but creatinine is actually better this admission than previous creatinine levels.  It was around 1.39.    His N-terminal proBNP is significantly elevated at 9974.  His hemoglobin is 10.2.  Potassium is normal at 4.8.  Magnesium was not drawn.  Since admission, his received intravenous Lasix with some diuresis.  He is having some leg cramping which is new.  Blood pressures were high on admission but have now improved although still in the higher range.     His Covid test was still pending.       Patient Active Problem List   Diagnosis     Coronary atherosclerosis     Benign neoplasm of colon     Localized osteoarthrosis, lower leg     Allergic rhinitis     Moderate persistent asthma     Anemia     Osteoporosis     Hypertension goal BP (blood pressure) < 140/90     Hyperlipidemia with target LDL less than 100     CKD (chronic kidney disease) stage 3, GFR 30-59 ml/min     Nonrheumatic aortic valve stenosis     Advanced directives, counseling/discussion     Iron deficiency anemia     Vitamin B 12 deficiency     Health Care Home     Mild persistent asthma without complication     Acute respiratory failure (H)     Acute on chronic congestive heart failure, unspecified heart failure type (H)      Severe aortic stenosis     Coronary artery disease involving native coronary artery of native heart without angina pectoris     S/P CABG (coronary artery bypass graft)     Hyperkalemia     GI bleed     Pneumonia     Atrial fibrillation (H)     Thrombus of left atrial appendage     Complete heart block (H)     Aortic stenosis, severe     Acute exacerbation of CHF (congestive heart failure) (H)     Community acquired pneumonia     Acute congestive heart failure, unspecified heart failure type (H)     History of transcatheter aortic valve replacement (TAVR)       Past Medical History   I have reviewed this patient's medical history and updated it with pertinent information if needed.   Past Medical History:   Diagnosis Date     Allergic rhinitis, cause unspecified      Anemia, unspecified      Aortic stenosis     severe     Benign neoplasm of colon 1999    Ademonatous polyp     CKD (chronic kidney disease) stage 3, GFR 30-59 ml/min 05/12/2011     Coronary atherosclerosis of unspecified type of vessel, native or graft     s/p CABG 2002     Hyperlipidemia LDL goal < 100      Hypertension goal BP (blood pressure) < 140/90      Localized osteoarthrosis not specified whether primary or secondary, lower leg     left knee     Moderate persistent asthma      Pacemaker      Persistent atrial fibrillation (H)      Unspecified hearing loss        Past Surgical History   I have reviewed this patient's surgical history and updated it with pertinent information if needed.  Past Surgical History:   Procedure Laterality Date     CARDIAC SURGERY       COLONOSCOPY N/A 5/26/2020    Procedure: COLONOSCOPY;  Surgeon: Ignacio Fournier MD;  Location:  GI     CV ANGIOGRAM CORONARY GRAFT N/A 4/24/2020    Procedure: Angiogram Coronary Graft;  Surgeon: Addison Willard MD;  Location:  HEART CARDIAC CATH LAB     CV CORONARY ANGIOGRAM N/A 4/24/2020    Procedure: Coronary Angiogram;  Surgeon: Addison Willard MD;  Location:   HEART CARDIAC CATH LAB     CV RIGHT HEART CATH N/A 4/24/2020    Procedure: Right Heart Cath;  Surgeon: Addison Willard MD;  Location:  HEART CARDIAC CATH LAB     CV TRANSCATHETER AORTIC VALVE REPLACEMENT N/A 7/14/2020    Procedure: Transcatheter Aortic Valve Replacement;  Surgeon: Soraida Monet MD;  Location:  HEART CARDIAC CATH LAB     EP PACEMAKER N/A 7/15/2020    Procedure: EP Pacemaker;  Surgeon: Tommie Sauer MD;  Location:  HEART CARDIAC CATH LAB     HC COLONOSCOPY THRU STOMA W BIOPSY/CAUTERY TUMOR/POLYP/LESION  5/03    Dr. Dow, Q 5 years     HC COLONOSCOPY THRU STOMA W BIOPSY/CAUTERY TUMOR/POLYP/LESION  12/199    Dr. Dow, one adenomatous polyp     HC ESOPHAGOSCOPY, DIAGNOSTIC  5/03    Dr. Dow, lower esophageal ring & mild gastritis     HERNIORRHAPHY INGUINAL Right 9/26/2016    Procedure: HERNIORRHAPHY INGUINAL;  Surgeon: Alexis Alexandra MD;  Location: [facility]     LAPAROSCOPIC CHOLECYSTECTOMY  12/03    for biliary sludge     ZZC NONSPECIFIC PROCEDURE  5/02    Coronary artery bypass       Prior to Admission Medications   Prior to Admission Medications   Prescriptions Last Dose Informant Patient Reported? Taking?   albuterol (PROAIR HFA/PROVENTIL HFA/VENTOLIN HFA) 108 (90 Base) MCG/ACT inhaler 10/27/2020 at Unknown time Self Yes Yes   Sig: Inhale 2 puffs into the lungs 4 times daily as needed for shortness of breath / dyspnea or wheezing   aspirin (ASA) 81 MG EC tablet 10/27/2020 at AM Self Yes Yes   Sig: Take 81 mg by mouth daily   budesonide (PULMICORT) 0.5 MG/2ML nebulizer solution  at AM Self No No   Sig: Take 2 mLs (0.5 mg) by nebulization 2 times daily   calcium carbonate 600 mg-vitamin D 400 units (CALTRATE) 600-400 MG-UNIT per tablet 10/27/2020 at AM Self Yes Yes   Sig: Take 1 tablet by mouth daily   dabigatran ANTICOAGULANT (PRADAXA) 150 MG capsule 10/27/2020 at AM Self No Yes   Sig: Take 1 capsule (150 mg) by mouth 2 times daily Store in original  's bottle or blister pack; use within 120 days of opening.   ferrous sulfate (IRON) 325 (65 Fe) MG tablet 10/27/2020 at x2 Self No Yes   Sig: TAKE 1 TABLET(325 MG) BY MOUTH TWICE DAILY   ipratropium - albuterol 0.5 mg/2.5 mg/3 mL (DUONEB) 0.5-2.5 (3) MG/3ML nebulization 10/27/2020 at Unknown time Self No Yes   Sig: Inhale 1 vial (3 mLs) into the lungs 4 times daily   lisinopril (ZESTRIL) 20 MG tablet 10/27/2020 at AM  No Yes   Sig: Take 1 tablet (20 mg) by mouth daily   meclizine (ANTIVERT) 12.5 MG tablet  at PRN  No Yes   Sig: Take 1 tablet (12.5 mg) by mouth 3 times daily as needed for dizziness   melatonin 3 MG tablet  at PRN  No Yes   Sig: Take 1 tablet (3 mg) by mouth nightly as needed for sleep   metoprolol tartrate (LOPRESSOR) 25 MG tablet 10/27/2020 at AM  No Yes   Sig: Take 1 tablet (25 mg) by mouth 2 times daily   montelukast (SINGULAIR) 10 MG tablet 10/26/2020 at HS Self No Yes   Sig: TAKE 1 TABLET(10 MG) BY MOUTH DAILY   multivitamin, therapeutic (THERA-VIT) TABS tablet 10/26/2020 at PM Self Yes Yes   Sig: Take 1 tablet by mouth every evening    nitroGLYcerin (NITROSTAT) 0.4 MG sublingual tablet  at PRN  No Yes   Sig: For chest pain place 1 tablet under the tongue every 5 minutes for 3 doses. If symptoms persist 5 minutes after 1st dose call 911.   pantoprazole (PROTONIX) 40 MG EC tablet 10/27/2020 at Unknown time  No Yes   Sig: TAKE ONE TABLET BY MOUTH TWICE A DAY BEFORE MEALS   simvastatin (ZOCOR) 40 MG tablet 10/26/2020 at HS Self No Yes   Sig: TAKE 1 TABLET(40 MG) BY MOUTH AT BEDTIME      Facility-Administered Medications: None     Current Facility-Administered Medications   Medication Dose Route Frequency     aspirin  81 mg Oral Daily     budesonide  0.5 mg Nebulization BID     dabigatran ANTICOAGULANT  150 mg Oral BID     ferrous sulfate  325 mg Oral Daily     ipratropium-albuterol  1 puff Inhalation 4x daily     lisinopril  20 mg Oral Daily     metoprolol tartrate  25 mg Oral BID      montelukast  10 mg Oral Daily     pantoprazole  40 mg Oral BID AC     senna-docusate  1 tablet Oral BID    Or     senna-docusate  2 tablet Oral BID     simvastatin  40 mg Oral At Bedtime     sodium chloride (PF)  3 mL Intracatheter Q8H     Current Facility-Administered Medications   Medication Last Rate     - MEDICATION INSTRUCTIONS -       Allergies   No Known Allergies    Social History    reports that he has quit smoking. His smoking use included cigarettes. He has never used smokeless tobacco. He reports current alcohol use. He reports that he does not use drugs.    Family History   Family History   Problem Relation Age of Onset     C.A.D. Father      Cancer Mother         breast cancer,  of pneumonia     Cerebrovascular Disease Son      CABG Son      Family History Negative Child      Family History Negative Sister      Heart Disease Brother        Review of Systems   The comprehensive 10 point Review of Systems is negative other than noted in the HPI or here.     Physical Exam   Vital Signs with Ranges  Temp:  [96.9  F (36.1  C)-97.9  F (36.6  C)] 97.6  F (36.4  C)  Pulse:  [60-84] 84  Resp:  [18-29] 22  BP: (140-217)/(58-90) 140/58  SpO2:  [92 %-96 %] 93 %  Wt Readings from Last 4 Encounters:   10/27/20 70 kg (154 lb 4.8 oz)   10/27/20 69.4 kg (153 lb)   10/12/20 71.8 kg (158 lb 3.2 oz)   10/01/20 68.1 kg (150 lb 1.6 oz)     I/O last 3 completed shifts:  In: 120 [P.O.:120]  Out: 2275 [Urine:2275]      Vitals: BP (!) 140/58 (BP Location: Left arm)   Pulse 84   Temp 97.6  F (36.4  C) (Oral)   Resp 22   Wt 70 kg (154 lb 4.8 oz)   SpO2 93%   BMI 23.46 kg/m      Constitutional:   no apparent distress   Eyes:   extra-ocular muscles intact   ENT:   atramatic   Neck:   Positive HJR   Hematologic / Lymphatic:   no cervical lymphadenopathy   Back:   Mild kyphosis   Lungs:   Decreased AE at bases, scatterred rales   Cardiovascular:   irregularly irregular rhythm   Abdomen:   normal bowel sounds and  non-distended     Neurologic:   Motor Exam:  moves all extremities well and symmetrically   Neuropsychiatric:   Orientation: oriented to self, place, time and situation   Skin:   no lesions       Recent Labs   Lab 10/27/20  1812   TROPI 0.032       Recent Labs   Lab 10/28/20  0540 10/28/20  0143 10/27/20  1812   WBC 4.3  --  3.4*   HGB 10.6*  --  10.2*   MCV 68*  --  68*   *  --  105*    138 138   POTASSIUM 4.1 3.8 4.8   CHLORIDE 104 105 107   CO2 23 23 24   BUN 30 30 31*   CR 1.39* 1.33* 1.32*   GFRESTIMATED 46* 48* 49*   GFRESTBLACK 53* 56* 56*   ANIONGAP 11 10 7   AYALA 9.6 9.8 9.4   GLC 77 85 96   TROPI  --   --  0.032     Recent Labs   Lab Test 06/01/20  0907 06/12/19  0818 07/07/15  0856 07/07/15  0856 01/06/15  0823   CHOL 119 136   < > 117 129   HDL 48 46   < > 39* 48   LDL 54 74   < > 63 68   TRIG 87 82   < > 73 65   CHOLHDLRATIO  --   --   --  3.0 2.7    < > = values in this interval not displayed.     Recent Labs   Lab 10/28/20  0540 10/27/20  1812   WBC 4.3 3.4*   HGB 10.6* 10.2*   HCT 34.3* 32.6*   MCV 68* 68*   * 105*     No results for input(s): PH, PHV, PO2, PO2V, SAT, PCO2, PCO2V, HCO3, HCO3V in the last 168 hours.  Recent Labs   Lab 10/27/20  1812   NTBNPI 9,974*     No results for input(s): DD in the last 168 hours.  No results for input(s): SED, CRP in the last 168 hours.  Recent Labs   Lab 10/28/20  0540 10/27/20  1812   * 105*     No results for input(s): TSH in the last 168 hours.  No results for input(s): COLOR, APPEARANCE, URINEGLC, URINEBILI, URINEKETONE, SG, UBLD, URINEPH, PROTEIN, UROBILINOGEN, NITRITE, LEUKEST, RBCU, WBCU in the last 168 hours.    Imaging:  Recent Results (from the past 48 hour(s))   XR Chest Port 1 View    Narrative    CHEST ONE VIEW PORTABLE    10/27/2020 6:30 PM     HISTORY: Pneumonia    COMPARISON: CT chest abdomen pelvis on 9/28/2020.      Impression    IMPRESSION: Single portable AP view of the chest was obtained. Post  surgical changes of  prosthetic aortic valve replacement with median  sternotomy wires and surgical clips. Left chest wall cardiac pacer is  also noted. Stable mild enlargement of the cardiac silhouette. Right  basilar airspace pulmonary opacities, worrisome for infection. No  significant pleural effusion or pneumothorax.    MARVA CLOUD MD       Echo:  No results found for this or any previous visit (from the past 4320 hour(s)).

## 2020-10-28 NOTE — UTILIZATION REVIEW
"    Admission Status; Secondary Review Determination         Under the authority of the Utilization Management Committee, the utilization review process indicated a secondary review on the above patient.  The review outcome is based on review of the medical records, discussions with staff, and applying clinical experience noted on the date of the review.        (x)      Inpatient Status Appropriate - This patient's medical care is consistent with medical management for inpatient care and reasonable inpatient medical practice.      () Observation Status Appropriate - This patient does not meet hospital inpatient criteria and is placed in observation status. If this patient's primary payer is Medicare and was admitted as an inpatient, Condition Code 44 should be used and patient status changed to \"observation\".   () Admission Status NOT Appropriate - This patient's medical care is not consistent with medical management for Inpatient or Observation Status.          RATIONALE FOR DETERMINATION     \"Shiraz Ingram is a 85 year old male with history of CKD, CAD, chronic diastolic heart failure, hyperlipidemia, hypertension, asthma, and atrial fibrillation on dabigatran who presented to the ED with worsening shortness of breath and edema for the past several days.  He is admitted to the heart center with probable heart failure exacerbation.\"    Pt is admitted with acute diastolic CHF. He is on IV lasix and will be getting a TTE. The patient is likely to stay > 2 MN for IV lasix and hence, inpatient status is appropriate.      The severity of illness, intensity of service provided, expected LOS and risk for adverse outcome make the care complex, high risk and appropriate for hospital admission.        The information on this document is developed by the utilization review team in order for the business office to ensure compliance.  This only denotes the appropriateness of proper admission status and does not reflect the " quality of care rendered.         The definitions of Inpatient Status and Observation Status used in making the determination above are those provided in the CMS Coverage Manual, Chapter 1 and Chapter 6, section 70.4.      Sincerely,     CARLOS MUNOZ MD    Physician Advisor  Utilization Review/ Case Management  [facility].

## 2020-10-29 LAB
ANION GAP SERPL CALCULATED.3IONS-SCNC: 7 MMOL/L (ref 3–14)
BUN SERPL-MCNC: 35 MG/DL (ref 7–30)
CALCIUM SERPL-MCNC: 9.3 MG/DL (ref 8.5–10.1)
CHLORIDE SERPL-SCNC: 102 MMOL/L (ref 94–109)
CO2 SERPL-SCNC: 29 MMOL/L (ref 20–32)
CREAT SERPL-MCNC: 1.63 MG/DL (ref 0.66–1.25)
GFR SERPL CREATININE-BSD FRML MDRD: 38 ML/MIN/{1.73_M2}
GLUCOSE SERPL-MCNC: 96 MG/DL (ref 70–99)
MAGNESIUM SERPL-MCNC: 1.8 MG/DL (ref 1.6–2.3)
PHOSPHATE SERPL-MCNC: 4.6 MG/DL (ref 2.5–4.5)
POTASSIUM SERPL-SCNC: 4.1 MMOL/L (ref 3.4–5.3)
SODIUM SERPL-SCNC: 138 MMOL/L (ref 133–144)

## 2020-10-29 PROCEDURE — 250N000011 HC RX IP 250 OP 636: Performed by: INTERNAL MEDICINE

## 2020-10-29 PROCEDURE — 250N000013 HC RX MED GY IP 250 OP 250 PS 637: Performed by: INTERNAL MEDICINE

## 2020-10-29 PROCEDURE — 80048 BASIC METABOLIC PNL TOTAL CA: CPT | Performed by: INTERNAL MEDICINE

## 2020-10-29 PROCEDURE — 94640 AIRWAY INHALATION TREATMENT: CPT

## 2020-10-29 PROCEDURE — 36415 COLL VENOUS BLD VENIPUNCTURE: CPT | Performed by: INTERNAL MEDICINE

## 2020-10-29 PROCEDURE — 210N000002 HC R&B HEART CARE

## 2020-10-29 PROCEDURE — 94640 AIRWAY INHALATION TREATMENT: CPT | Mod: 76

## 2020-10-29 PROCEDURE — 250N000009 HC RX 250: Performed by: HOSPITALIST

## 2020-10-29 PROCEDURE — 250N000013 HC RX MED GY IP 250 OP 250 PS 637: Performed by: HOSPITALIST

## 2020-10-29 PROCEDURE — 999N000157 HC STATISTIC RCP TIME EA 10 MIN

## 2020-10-29 PROCEDURE — 99232 SBSQ HOSP IP/OBS MODERATE 35: CPT | Performed by: INTERNAL MEDICINE

## 2020-10-29 PROCEDURE — 84100 ASSAY OF PHOSPHORUS: CPT | Performed by: INTERNAL MEDICINE

## 2020-10-29 PROCEDURE — 83735 ASSAY OF MAGNESIUM: CPT | Performed by: INTERNAL MEDICINE

## 2020-10-29 RX ORDER — CALCIUM CARBONATE 500 MG/1
1000 TABLET, CHEWABLE ORAL EVERY 4 HOURS PRN
Status: DISCONTINUED | OUTPATIENT
Start: 2020-10-29 | End: 2020-10-30 | Stop reason: HOSPADM

## 2020-10-29 RX ORDER — ACETAMINOPHEN 325 MG/1
650 TABLET ORAL EVERY 4 HOURS PRN
Status: DISCONTINUED | OUTPATIENT
Start: 2020-10-29 | End: 2020-10-30 | Stop reason: HOSPADM

## 2020-10-29 RX ORDER — ACETAMINOPHEN 650 MG/1
650 SUPPOSITORY RECTAL EVERY 4 HOURS PRN
Status: DISCONTINUED | OUTPATIENT
Start: 2020-10-29 | End: 2020-10-30 | Stop reason: HOSPADM

## 2020-10-29 RX ADMIN — ACETAMINOPHEN 650 MG: 325 TABLET, FILM COATED ORAL at 23:19

## 2020-10-29 RX ADMIN — ACETAMINOPHEN 650 MG: 325 TABLET, FILM COATED ORAL at 08:15

## 2020-10-29 RX ADMIN — BUDESONIDE 0.5 MG: 0.5 INHALANT RESPIRATORY (INHALATION) at 19:46

## 2020-10-29 RX ADMIN — IPRATROPIUM BROMIDE AND ALBUTEROL 1 PUFF: 20; 100 SPRAY, METERED RESPIRATORY (INHALATION) at 16:51

## 2020-10-29 RX ADMIN — LISINOPRIL 20 MG: 20 TABLET ORAL at 08:11

## 2020-10-29 RX ADMIN — FUROSEMIDE 40 MG: 10 INJECTION, SOLUTION INTRAVENOUS at 05:47

## 2020-10-29 RX ADMIN — DABIGATRAN ETEXILATE MESYLATE 150 MG: 150 CAPSULE ORAL at 20:59

## 2020-10-29 RX ADMIN — IPRATROPIUM BROMIDE AND ALBUTEROL 1 PUFF: 20; 100 SPRAY, METERED RESPIRATORY (INHALATION) at 12:22

## 2020-10-29 RX ADMIN — IPRATROPIUM BROMIDE AND ALBUTEROL 1 PUFF: 20; 100 SPRAY, METERED RESPIRATORY (INHALATION) at 21:03

## 2020-10-29 RX ADMIN — FERROUS SULFATE TAB 325 MG (65 MG ELEMENTAL FE) 325 MG: 325 (65 FE) TAB at 08:10

## 2020-10-29 RX ADMIN — DABIGATRAN ETEXILATE MESYLATE 150 MG: 150 CAPSULE ORAL at 08:10

## 2020-10-29 RX ADMIN — ASPIRIN 81 MG: 81 TABLET, COATED ORAL at 08:11

## 2020-10-29 RX ADMIN — SIMVASTATIN 40 MG: 40 TABLET, FILM COATED ORAL at 21:00

## 2020-10-29 RX ADMIN — PANTOPRAZOLE SODIUM 40 MG: 40 TABLET, DELAYED RELEASE ORAL at 16:40

## 2020-10-29 RX ADMIN — ACETAMINOPHEN 650 MG: 325 TABLET, FILM COATED ORAL at 01:02

## 2020-10-29 RX ADMIN — IPRATROPIUM BROMIDE AND ALBUTEROL 1 PUFF: 20; 100 SPRAY, METERED RESPIRATORY (INHALATION) at 08:12

## 2020-10-29 RX ADMIN — METOPROLOL TARTRATE 25 MG: 25 TABLET, FILM COATED ORAL at 20:58

## 2020-10-29 RX ADMIN — BUDESONIDE 0.5 MG: 0.5 INHALANT RESPIRATORY (INHALATION) at 07:46

## 2020-10-29 RX ADMIN — MONTELUKAST 10 MG: 10 TABLET, FILM COATED ORAL at 21:00

## 2020-10-29 RX ADMIN — Medication 1 MG: at 20:59

## 2020-10-29 RX ADMIN — PANTOPRAZOLE SODIUM 40 MG: 40 TABLET, DELAYED RELEASE ORAL at 05:50

## 2020-10-29 RX ADMIN — METOPROLOL TARTRATE 25 MG: 25 TABLET, FILM COATED ORAL at 08:11

## 2020-10-29 ASSESSMENT — ACTIVITIES OF DAILY LIVING (ADL)
ADLS_ACUITY_SCORE: 14

## 2020-10-29 NOTE — PROGRESS NOTES
[facility]    Medicine Progress Note - Hospitalist Service       Date of Admission:  10/27/2020  Assessment & Plan        Shiraz Ingram is a 85 year old male with history of CKD, CAD, chronic diastolic heart failure, hyperlipidemia, hypertension, asthma, and atrial fibrillation on dabigatran who presented to the ED with worsening shortness of breath and edema for the past several days.  He is admitted to the heart center with probable heart failure exacerbation.     Acute on chronic heart failure with preserved EF  History of aortic stenosis s/p TAVR in July 2020  Echo subsequent to TAVR in August of this year showed EF of 50 to 55%.  Patient reports several pound weight gain and increasing lower extremity edema.  He denies any chest pain or pressure or palpitations.  No infectious sounding symptoms.  EKG and troponin unremarkable.  BNP 9k.  Last hospital admission 9/28-10/1 for pneumonia and also RD.  At the time of his discharge lasix was discontinued. Edema and shortness of breath improved.   - TTE-LVEF 55-60%, s/p TAVR (7/2020), mild-mod AR, degree of AR new compared to recent study  - has been on Lasix 40mg BID, last dose this am, given bump in creatinine, hold further IV lasix, defer to cards regarding transitioning to PO   - continue with PTA metoprolol  - continue Lisinopril 20mg qAM, 10mg PM added per cards on 10/28 (hold this today due to increased cr)  - appreciate cardiology consult, per cards Torsemide preferred for PO, discussed with Dr. Villar  - compression stockings for edema  - Telemetry monitoring   - Monitor daily weights and ins and outs, - wt down by 10Ibs since admission, net -4.7L    Leg cramps: resolved  Started on admission after diuretics and diuresis.  Likely due to diuresis and fluid shift. K, mag and phos are normal range. This has now resolved    Hypertensive urgency on admission, improved  Hypertension  Hyperlipidemia  Considerably hypertensive with systolics  around 200 in ED initially.   - restart PTA Lisinopril 20mg daily and evening 10mg added on 10/28 (holding this today due to bump in creatinine)  - BP is currently better controlled  - continue with statin  - diuresis as above     CKD-III  Historically creatinine ranging 1.4-1.5. recent RD end of September with cr peak at 2. Diuretics was discontinued at that time. Cr. On admission 1.32  - cr this am at 1.63  - holding further diuresis for today as above, has received AM dose  - f/u BMP in AM    Atrial fibrillation s/p pacemaker, on pradaxa  HR acceptable on admission.    - PTA metoprolol continued  - PTA pradaxa to continue      Asthma  Stable upon admission.  - resume PTA inhaler  - resume PTA nebs once confirmed negative COVID screen     Asymptomatic COVID 19 screening negative on 10/27          Diet: Combination Diet Regular Diet Adult; 2 gm NA Diet    DVT Prophylaxis: Pradaxa  Pak Catheter: not present  Code Status: No CPR- Do NOT Intubate           Disposition Plan   Expected discharge: 1-2 days, recommended to prior living arrangement once transition to PO diuretics and stable renal function.  Entered: Eleanor Burt MD 10/29/2020, 9:41 AM       The patient's care was discussed with the Bedside Nurse and Patient.    Eleanor Burt MD  Hospitalist Service  [facility]  Contact information available via [facility] Paging/Directory    ______________________________________________________________________    Interval History    Sign language interpretor present.   Patient reports dyspnea is much improved and leg swelling also improved  Leg cramp is resolved.   Denies n/v, abdominal pain, fever or chills.     Data reviewed today: I reviewed all medications, new labs and imaging results over the last 24 hours. I personally reviewed no images or EKG's today.    Physical Exam   Vital Signs: Temp: 97.7  F (36.5  C) Temp src: Oral BP: 118/55 Pulse: 62   Resp: 18 SpO2: 95 % O2 Device:  None (Room air)    Weight: 144 lbs 0 oz  General Appearance: Alert, awake and no apparent distress  Respiratory: clear to auscultation bilaterally, no wheezing  Cardiovascular: regular rate and rhythm, 1+ LE edema  GI: soft and non-tender  Skin: warm and dry      Data   Recent Labs   Lab 10/29/20  0532 10/28/20  1235 10/28/20  0540 10/28/20  0143 10/27/20  1812   WBC  --   --  4.3  --  3.4*   HGB  --   --  10.6*  --  10.2*   MCV  --   --  68*  --  68*   PLT  --   --  107*  --  105*     --  138 138 138   POTASSIUM 4.1 4.5 4.1 3.8 4.8   CHLORIDE 102  --  104 105 107   CO2 29  --  23 23 24   BUN 35*  --  30 30 31*   CR 1.63*  --  1.39* 1.33* 1.32*   ANIONGAP 7  --  11 10 7   AYALA 9.3  --  9.6 9.8 9.4   GLC 96  --  77 85 96   TROPI  --   --   --   --  0.032     Recent Results (from the past 24 hour(s))   Echo Limited    Narrative    [id number]  [id number]  [id number]  [id number]^SADIQ^JERAMIE^JOHNATHAN           [facility]  Echocardiography Laboratory  [address] [place], [place] [address]        Name: SRIKANTH OBANDO  MRN: [id number]  : 1934  Study Date: 10/28/2020 10:50 AM  Age: 85 yrs  Gender: Male  Patient Location: [facility]  Reason For Study: Edema, SOB  Ordering Physician: JERAMIE BABCOCK  Referring Physician: RANI ARTHUR  Performed By: Larry Mahoney     BSA: 1.8 m2  Height: 68 in  Weight: 154 lb  HR: 78  BP: 140/58 mmHg  _____________________________________________________________________________  __        Procedure  Limited Portable Echo Adult.  _____________________________________________________________________________  __        Interpretation Summary     s/p TAVR with 29mm Medtronic, implanted 2020  Prominent posterior paravalvular jet, smaller anterior paravalvular jet, seen  mainly in apical 5 chamber view. This degree of AR is new compared with most  recent study.  There is severe biatrial enlargement.  There is moderate to severe concentric left ventricular  hypertrophy.  The visual ejection fraction is estimated at 55-60%.  There is a catheter/pacemaker lead seen in the right ventricle.  The right ventricle is mild to moderately dilated.  Right ventricular systolic pressure is elevated, consistent with moderate  pulmonary hypertension.  The rhythm was atrial fibrillation.  There is mild to moderate (1-2+) tricuspid regurgitation.  There is mild to moderate (1-2+) aortic regurgitation.  _____________________________________________________________________________  __        Left Ventricle  The left ventricle is normal in size. There is moderate to severe concentric  left ventricular hypertrophy. The visual ejection fraction is estimated at 55-  60%.     Right Ventricle  There is a catheter/pacemaker lead seen in the right ventricle. The right  ventricle is mild to moderately dilated.     Atria  There is severe biatrial enlargement.     Mitral Valve  The mitral valve leaflets are mildly thickened. There is trace mitral  regurgitation.        Tricuspid Valve  The tricuspid valve is not well visualized, but is grossly normal. Right  ventricular systolic pressure is elevated, consistent with moderate pulmonary  hypertension. There is mild to moderate (1-2+) tricuspid regurgitation.     Aortic Valve  There is mild to moderate (1-2+) aortic regurgitation. The prosthetic aortic  valve is well-seated.     Pulmonic Valve  The pulmonic valve is not well seen, but is grossly normal.     Vessels  The aortic root is normal size. Normal size ascending aorta. The inferior vena  cava is normal.     Pericardium  There is no pericardial effusion.        Rhythm  The rhythm was atrial fibrillation.  _____________________________________________________________________________  __  MMode/2D Measurements & Calculations  IVSd: 1.8 cm     LVIDd: 4.7 cm  LVIDs: 3.2 cm  LVPWd: 1.5 cm  FS: 32.8 %  LV mass(C)d: 337.5 grams  LV mass(C)dI: 184.5 grams/m2  asc Aorta Diam: 3.6 cm  RWT: 0.64         Doppler Measurements & Calculations  Ao V2 max: 206.3 cm/sec  Ao max P.0 mmHg  Ao V2 mean: 121.9 cm/sec  Ao mean P.3 mmHg  Ao V2 VTI: 35.2 cm  LV V1 max P.0 mmHg  LV V1 max: 132.4 cm/sec  LV V1 VTI: 22.8 cm  TR max vishnu: 354.3 cm/sec  TR max P.2 mmHg  AV Vishnu Ratio (DI): 0.64              _____________________________________________________________________________  __        Report approved by: Billie Del Rio 10/28/2020 01:37 PM        Medications     - MEDICATION INSTRUCTIONS -         aspirin  81 mg Oral Daily     budesonide  0.5 mg Nebulization BID     dabigatran ANTICOAGULANT  150 mg Oral BID     ferrous sulfate  325 mg Oral Daily     furosemide  40 mg Intravenous Q12H     ipratropium-albuterol  1 puff Inhalation 4x daily     lisinopril  10 mg Oral QPM     lisinopril  20 mg Oral Daily     metoprolol tartrate  25 mg Oral BID     montelukast  10 mg Oral Daily     pantoprazole  40 mg Oral BID AC     senna-docusate  1 tablet Oral BID    Or     senna-docusate  2 tablet Oral BID     simvastatin  40 mg Oral At Bedtime     sodium chloride (PF)  3 mL Intracatheter Q8H

## 2020-10-29 NOTE — PROGRESS NOTES
Cardiology Progress Note  Ashkan Villar MD         Assessment and Plan:      1.  Heart failure with preserved ejection fraction  S/p transfemoral aortic valve replacement in July  Patient shortness of breath has improved.  Edema resolved.  He has lost 10 pounds.  His creatinine however has increased likely from a bit of overdiuresis.  He received on morning dose of Lasix and will we will stop the IV Lasix for rest of the day.  Reassess kidney function tomorrow and start him on oral diuretics like torsemide 10 mg daily.  He is doing reasonably well.  He will need close follow-up in the core clinic after discharge.  Given some increase in creatinine, I would reduce his lisinopril to once a day.  His echocardiogram continues to reveal normal ejection fraction.  RV function however appears to be mildly reduced.  The aortic valve prosthesis gradients are adequate.  There is now mild to moderate perivalvular AI according to the report.     2.  History of coronary artery disease with previous bypass surgery  No angina.     3.  Hypertension  On lisinopril as well as metoprolol.   We will reduce lisinopril to once a day given increase in creatinine.    4.  Hyperlipidemia, continue simvastatin     5.  Chronic atrial fibrillation with PVCs, on Pradaxa.  Increase metoprolol as tolerated for better rate control.    Overall, congestive heart failure symptoms have improved.  I have discussed the plan with the hospitalist.  We will sign off.  Recall if questions     Ashkan Villar MD                      Interval History:   denies shortness of breath          Review of Systems:   The 5 point Review of Systems is negative other than noted in the HPI          Physical Exam:        Blood pressure 118/55, pulse 72, temperature 97.7  F (36.5  C), temperature source Oral, resp. rate 18, weight 65.3 kg (144 lb), SpO2 95 %.  Vitals:    10/27/20 2158 10/29/20 0246   Weight: 70 kg (154 lb 4.8 oz) 65.3 kg (144 lb)     Weights  since admission  Vitals:    10/27/20 2158 10/29/20 0246   Weight: 70 kg (154 lb 4.8 oz) 65.3 kg (144 lb)     Vital Signs with Ranges  Temp:  [97.7  F (36.5  C)-98.1  F (36.7  C)] 97.7  F (36.5  C)  Pulse:  [] 72  Resp:  [18-22] 18  BP: (115-173)/(55-87) 118/55  SpO2:  [93 %-96 %] 95 %  I/O's Last 24 hours  I/O last 3 completed shifts:  In: 1160 [P.O.:1160]  Out: 3780 [Urine:3780]  Net I/O since admission  10/24 0700 - 10/29 0659  In: 1280 [P.O.:1280]  Out: 6055 [Urine:6055]  Net: -4775    EXAM:    Head:   atramatic     Neck:   no JVD     Lungs:   normal     Cardiovascular:   irregular S1 and S2 and murmurs include systolic murmur II/VI located at right upper sternal border without radiation     Extremities and Back:   No edema     Neurological:   No gross or focal neurologic abnormalities            Medications:          aspirin  81 mg Oral Daily     budesonide  0.5 mg Nebulization BID     dabigatran ANTICOAGULANT  150 mg Oral BID     ferrous sulfate  325 mg Oral Daily     ipratropium-albuterol  1 puff Inhalation 4x daily     lisinopril  20 mg Oral Daily     metoprolol tartrate  25 mg Oral BID     montelukast  10 mg Oral Daily     pantoprazole  40 mg Oral BID AC     senna-docusate  1 tablet Oral BID    Or     senna-docusate  2 tablet Oral BID     simvastatin  40 mg Oral At Bedtime     sodium chloride (PF)  3 mL Intracatheter Q8H            Data:      All new lab and imaging data was reviewed.   Recent Labs   Lab Test 10/28/20  0540 10/27/20  1812 09/30/20  1024 07/13/20  1420 07/13/20  1420 06/03/20  1529 06/03/20  1529 05/23/20  2104 05/23/20  2104   WBC 4.3 3.4* 4.6   < > 3.6*   < > 3.8*   < > 8.1   HGB 10.6* 10.2* 8.2*   < > 11.5*   < > 9.6*   < > 6.7*   MCV 68* 68* 66*   < > 72*   < > 83   < > 68*   * 105* 122*   < > 168   < > 168   < > 228   INR  --   --   --   --  1.21*  --  1.25*  --  2.18*    < > = values in this interval not displayed.      Recent Labs   Lab Test 10/29/20  0532 10/28/20  1667  10/28/20  0540 10/28/20  0143     --  138 138   POTASSIUM 4.1 4.5 4.1 3.8   CHLORIDE 102  --  104 105   CO2 29  --  23 23   BUN 35*  --  30 30   CR 1.63*  --  1.39* 1.33*   ANIONGAP 7  --  11 10   AYALA 9.3  --  9.6 9.8   GLC 96  --  77 85     Recent Labs   Lab Test 10/27/20  1812 20  0643 20  0247   TROPI 0.032 0.041 0.050*              Imaging:   Recent Results (from the past 24 hour(s))   Echo Limited    Narrative    [id number]  [id number]  [id number]  [id number]^SADIQ^JERAMIE^JOHNATHAN           [facility]  Echocardiography Laboratory  [address] [place] [id number]        Name: SRIKANTH OBANDO  MRN: [id number]  : 1934  Study Date: 10/28/2020 10:50 AM  Age: 85 yrs  Gender: Male  Patient Location: [facility]  Reason For Study: Edema, SOB  Ordering Physician: JERAMIE BABCOCK  Referring Physician: RANI ARTHUR  Performed By: Larry Mahoney     BSA: 1.8 m2  Height: 68 in  Weight: 154 lb  HR: 78  BP: 140/58 mmHg  _____________________________________________________________________________  __        Procedure  Limited Portable Echo Adult.  _____________________________________________________________________________  __        Interpretation Summary     s/p TAVR with 29mm Medtronic, implanted 2020  Prominent posterior paravalvular jet, smaller anterior paravalvular jet, seen  mainly in apical 5 chamber view. This degree of AR is new compared with most  recent study.  There is severe biatrial enlargement.  There is moderate to severe concentric left ventricular hypertrophy.  The visual ejection fraction is estimated at 55-60%.  There is a catheter/pacemaker lead seen in the right ventricle.  The right ventricle is mild to moderately dilated.  Right ventricular systolic pressure is elevated, consistent with moderate  pulmonary hypertension.  The rhythm was atrial fibrillation.  There is mild to moderate (1-2+) tricuspid regurgitation.  There is mild to moderate (1-2+) aortic  regurgitation.  _____________________________________________________________________________  __        Left Ventricle  The left ventricle is normal in size. There is moderate to severe concentric  left ventricular hypertrophy. The visual ejection fraction is estimated at 55-  60%.     Right Ventricle  There is a catheter/pacemaker lead seen in the right ventricle. The right  ventricle is mild to moderately dilated.     Atria  There is severe biatrial enlargement.     Mitral Valve  The mitral valve leaflets are mildly thickened. There is trace mitral  regurgitation.        Tricuspid Valve  The tricuspid valve is not well visualized, but is grossly normal. Right  ventricular systolic pressure is elevated, consistent with moderate pulmonary  hypertension. There is mild to moderate (1-2+) tricuspid regurgitation.     Aortic Valve  There is mild to moderate (1-2+) aortic regurgitation. The prosthetic aortic  valve is well-seated.     Pulmonic Valve  The pulmonic valve is not well seen, but is grossly normal.     Vessels  The aortic root is normal size. Normal size ascending aorta. The inferior vena  cava is normal.     Pericardium  There is no pericardial effusion.        Rhythm  The rhythm was atrial fibrillation.  _____________________________________________________________________________  __  MMode/2D Measurements & Calculations  IVSd: 1.8 cm     LVIDd: 4.7 cm  LVIDs: 3.2 cm  LVPWd: 1.5 cm  FS: 32.8 %  LV mass(C)d: 337.5 grams  LV mass(C)dI: 184.5 grams/m2  asc Aorta Diam: 3.6 cm  RWT: 0.64        Doppler Measurements & Calculations  Ao V2 max: 206.3 cm/sec  Ao max P.0 mmHg  Ao V2 mean: 121.9 cm/sec  Ao mean P.3 mmHg  Ao V2 VTI: 35.2 cm  LV V1 max P.0 mmHg  LV V1 max: 132.4 cm/sec  LV V1 VTI: 22.8 cm  TR max vishnu: 354.3 cm/sec  TR max P.2 mmHg  AV Vishnu Ratio (DI): 0.64              _____________________________________________________________________________  __        Report approved by: Jaxon Field  Billie 10/28/2020 01:37 PM

## 2020-10-29 NOTE — CONSULTS
[facility] evaluation referral received from Britt Hernandez NP.  Patient is  currently established in the [facility] Clinic, seen by ALICJA Landis in CORE clinic twice in 5/2020, but has not been seen since. Patient has had numerous admissions since that time, as well as a TAVR. Per notes, tentative discharge in the next 1-2 days. Will arrange for in clinic CORE post hospital visit, as patient needs ASL interpretor, will arrange for next week with labs.     [facility] appointment made for:  Future Appointments   Date Time Provider Department Center   11/4/2020  1:30 PM PADRON LAB SULAB [facility]   11/4/2020  2:10 PM Amee Rocha PA-C SUUMHT [facility]   12/3/2020 12:00 [facility]   12/8/2020  9:50 AM  LAB DRAW 1 [facility] R[facility]   12/9/2020 10:00 AM Joon Morris MD [facility]     Please call with questions.     Rhoda Hager RN  CORE Clinic RN Care Coordinator  [facility]   [phone]    [facility] Clinic: Cardiomyopathy, Optimization, Rehabilitation, Education   The [facility] Clinic is a heart failure specialty clinic within the [facility] where you will work with your cardiologist, nurse practitioners, physician assistants and registered nurses who specialize in heart failure care. They are dedicated to helping patients with heart failure to carefully adjust medications, receive education, and learn who and when to call if symptoms develop. They specialize in helping you better understand your condition, slow the progression of your disease, improve the length and quality of your life, help you detect future heart problems before they become life threatening, and avoid hospitalizations.

## 2020-10-29 NOTE — PLAN OF CARE
Tylenol given for back pain x1. Core clinic consult. Plan to discharge tomorrow after transitioned to po lasix. Afib with CVR. Inspiratory wheezes. Rescue inhaler at bedside. A&O x4.

## 2020-10-29 NOTE — PROGRESS NOTES
pulmicort neb given as per order and home use. Bilateral breath sounds clear. Sp02 96% on room air. [place], RT

## 2020-10-29 NOTE — PLAN OF CARE
Shiraz is alert, oriented, pleasant. He is deaf and communicates with the use of pen and paper when an  isn't present. He continues to diurese with IV Lasix with 1425 mL output overnight. His ankles are significantly smaller. Muscle cramping has resolved.    He reports back pain. Tylenol, ambulation, and heat mostly effective. VSS on room air. Ambulated in hallway with minimal assist. Tele: a-fib with CVR and intermittent ventricular pacing. Anticipate discharge to home soon

## 2020-10-29 NOTE — PROVIDER NOTIFICATION
10/29/20  0049      Cross-cover hosptialist text paged with request for Tylenol. Pt having back pain, no PRN medications available.      Addendum: Order for Tylenol received.

## 2020-10-30 VITALS
DIASTOLIC BLOOD PRESSURE: 77 MMHG | OXYGEN SATURATION: 95 % | RESPIRATION RATE: 18 BRPM | HEART RATE: 75 BPM | WEIGHT: 142.7 LBS | BODY MASS INDEX: 21.7 KG/M2 | SYSTOLIC BLOOD PRESSURE: 133 MMHG | TEMPERATURE: 97.8 F

## 2020-10-30 LAB
ANION GAP SERPL CALCULATED.3IONS-SCNC: 3 MMOL/L (ref 3–14)
BUN SERPL-MCNC: 39 MG/DL (ref 7–30)
CALCIUM SERPL-MCNC: 8.9 MG/DL (ref 8.5–10.1)
CHLORIDE SERPL-SCNC: 101 MMOL/L (ref 94–109)
CO2 SERPL-SCNC: 32 MMOL/L (ref 20–32)
CREAT SERPL-MCNC: 1.54 MG/DL (ref 0.66–1.25)
GFR SERPL CREATININE-BSD FRML MDRD: 40 ML/MIN/{1.73_M2}
GLUCOSE BLDC GLUCOMTR-MCNC: 94 MG/DL (ref 70–99)
GLUCOSE SERPL-MCNC: 94 MG/DL (ref 70–99)
POTASSIUM SERPL-SCNC: 3.8 MMOL/L (ref 3.4–5.3)
SODIUM SERPL-SCNC: 136 MMOL/L (ref 133–144)

## 2020-10-30 PROCEDURE — 36415 COLL VENOUS BLD VENIPUNCTURE: CPT | Performed by: INTERNAL MEDICINE

## 2020-10-30 PROCEDURE — 99239 HOSP IP/OBS DSCHRG MGMT >30: CPT | Performed by: INTERNAL MEDICINE

## 2020-10-30 PROCEDURE — 80048 BASIC METABOLIC PNL TOTAL CA: CPT | Performed by: INTERNAL MEDICINE

## 2020-10-30 PROCEDURE — 94640 AIRWAY INHALATION TREATMENT: CPT

## 2020-10-30 PROCEDURE — 999N001017 HC STATISTIC GLUCOSE BY METER IP

## 2020-10-30 PROCEDURE — 999N000157 HC STATISTIC RCP TIME EA 10 MIN

## 2020-10-30 PROCEDURE — 250N000013 HC RX MED GY IP 250 OP 250 PS 637: Performed by: INTERNAL MEDICINE

## 2020-10-30 PROCEDURE — 250N000013 HC RX MED GY IP 250 OP 250 PS 637: Performed by: HOSPITALIST

## 2020-10-30 PROCEDURE — 250N000009 HC RX 250: Performed by: HOSPITALIST

## 2020-10-30 RX ORDER — ALBUTEROL SULFATE 90 UG/1
AEROSOL, METERED RESPIRATORY (INHALATION)
Qty: 18 G | Refills: 2 | Status: SHIPPED | OUTPATIENT
Start: 2020-10-30 | End: 2021-03-22

## 2020-10-30 RX ORDER — TORSEMIDE 10 MG/1
10 TABLET ORAL DAILY
Qty: 30 TABLET | Refills: 0 | Status: SHIPPED | OUTPATIENT
Start: 2020-10-30 | End: 2020-11-04

## 2020-10-30 RX ORDER — TORSEMIDE 10 MG/1
10 TABLET ORAL DAILY
Status: DISCONTINUED | OUTPATIENT
Start: 2020-10-30 | End: 2020-10-30 | Stop reason: HOSPADM

## 2020-10-30 RX ADMIN — ACETAMINOPHEN 650 MG: 325 TABLET, FILM COATED ORAL at 11:12

## 2020-10-30 RX ADMIN — PANTOPRAZOLE SODIUM 40 MG: 40 TABLET, DELAYED RELEASE ORAL at 06:30

## 2020-10-30 RX ADMIN — BUDESONIDE 0.5 MG: 0.5 INHALANT RESPIRATORY (INHALATION) at 07:54

## 2020-10-30 RX ADMIN — ASPIRIN 81 MG: 81 TABLET, COATED ORAL at 08:26

## 2020-10-30 RX ADMIN — FERROUS SULFATE TAB 325 MG (65 MG ELEMENTAL FE) 325 MG: 325 (65 FE) TAB at 08:26

## 2020-10-30 RX ADMIN — TORSEMIDE 10 MG: 10 TABLET ORAL at 11:12

## 2020-10-30 RX ADMIN — LISINOPRIL 20 MG: 20 TABLET ORAL at 08:26

## 2020-10-30 RX ADMIN — DABIGATRAN ETEXILATE MESYLATE 150 MG: 150 CAPSULE ORAL at 08:26

## 2020-10-30 RX ADMIN — METOPROLOL TARTRATE 25 MG: 25 TABLET, FILM COATED ORAL at 08:26

## 2020-10-30 RX ADMIN — IPRATROPIUM BROMIDE AND ALBUTEROL 1 PUFF: 20; 100 SPRAY, METERED RESPIRATORY (INHALATION) at 08:30

## 2020-10-30 ASSESSMENT — ACTIVITIES OF DAILY LIVING (ADL)
ADLS_ACUITY_SCORE: 14

## 2020-10-30 NOTE — DISCHARGE SUMMARY
[facility]  Hospitalist Discharge Summary      Date of Admission:  10/27/2020  Date of Discharge:  10/30/2020  Discharging Provider: Eleanor Burt MD      Discharge Diagnoses   Acute on chronic heart failure with preserved EF  History of aortic stenosis s/p TAVR in July 2020  Hypertensive urgency on admission, resolved  Hypertension  Hyperlipidemia  CKD-III  A.fib s/p pacemaker placement  Asthma      Follow-ups Needed After Discharge   Follow-up Appointments     Follow-up and recommended labs and tests       Follow up with [facility] as scheduled.             Unresulted Labs Ordered in the Past 30 Days of this Admission     No orders found from 9/27/2020 to 10/28/2020.          Discharge Disposition   Discharged to home  Condition at discharge: Stable  Patient ready to discharge to a skilled nursing facility as soon as possible in order to create capacity for patients related to the COVID-19 pandemic.    Hospital Course         Shiraz Ingram is a 85 year old male with history of CKD, CAD, chronic diastolic heart failure, hyperlipidemia, hypertension, asthma, and atrial fibrillation on dabigatran who presented to the ED with worsening shortness of breath and edema for the past several days.  He is admitted for HFpEF.       Acute on chronic heart failure with preserved EF  History of aortic stenosis s/p TAVR in July 2020  Echo subsequent to TAVR in August of this year showed EF of 50 to 55%.  Patient reports several pound weight gain and increasing lower extremity edema.  He denies any chest pain or pressure or palpitations.  No infectious sounding symptoms.  EKG and troponin unremarkable.  BNP 9k.  Last hospital admission 9/28-10/1 for pneumonia and also RD.  At the time of his discharge lasix was discontinued. Edema and shortness of breath improved.   - TTE-LVEF 55-60%, s/p TAVR (7/2020), mild-mod AR, degree of AR new compared to recent study  - diuresed with Lasix IV in the  hospital with improved dyspnea and LE edema  - will discharge on Torsemide 10mg daily  - [facility] follow up scheduled for Nov 4  - continue with PTA Metoprolol and Lisinopril 20mg daily  - cardiology followed during hospital stay  - wt on day of discharge 142 Ibs (admission wt 154Ibs)      Leg cramps: resolved  Cramping started on admission after diuretics and diuresis.  Likely due to diuresis and fluid shift. K, mag and phos are normal range. This has now resolved    Hypertensive urgency on admission, resolved  Hypertension  Hyperlipidemia  Considerably hypertensive with systolics around 200 in ED initially.   - restart PTA Lisinopril 20mg daily, 10mg qevening was initially added, but discontinued due to a bump in creatinine.   - will continue PTA BB and Lisinopril 20mg daily  - Torsemide 10mg daily as above  - BP is currently controlled    CKD-III  Historically creatinine ranging 1.4-1.5. recent RD end of September with cr peak at 2. Diuretics was discontinued at that time. Cr. On admission 1.32. slight bum to 1.63 from baseline. This improved with diuresis to near baseline 1.54.  - diuretics as above.   - f/u BMP in 5 days, scheduled with [facility]    Atrial fibrillation s/p pacemaker, on pradaxa  HR acceptable on admission.    - PTA metoprolol continued  - PTA pradaxa to continue      Asthma  Stable upon admission.  - resume PTA inhaler    Asymptomatic COVID 19 screening negative on 10/27    ASL interpretor used for visit.     Consultations This Hospital Stay   CARDIOLOGY IP CONSULT  CARE MANAGEMENT / SOCIAL WORK IP CONSULT  [facility] EVALUATION IP CONSULT    Code Status   No CPR- Do NOT Intubate    Time Spent on this Encounter   I, Eleanor Burt MD, personally saw the patient today and spent 40 minutes discharging this patient.       Eleanor Burt MD  [facility] HEART [facility]  [address]  [facility] [address]  Phone:  [phone]  ______________________________________________________________________    Physical Exam   Vital Signs: Temp: 97.8  F (36.6  C) Temp src: Oral BP: 133/77 Pulse: 75   Resp: 18 SpO2: 95 % O2 Device: None (Room air)    Weight: 142 lbs 11.2 oz  General Appearance: alert, awake and no apparent distress  Respiratory: clear to auscultation bilaterally, no wheezing  Cardiovascular: regular rate and rhythm, +murmur, trace LE edema bilaterally  GI: soft and non-tender  Skin: warm and dry         Primary Care Physician   Dany Rodriguez MD    Discharge Orders      N terminal pro BNP outpatient     Basic metabolic panel     Follow-Up with [facility]      Reason for your hospital stay    You were admitted to the hospital for CHF     Follow-up and recommended labs and tests     Follow up with [facility] as scheduled.     Activity    Your activity upon discharge: activity as tolerated     No CPR- Do NOT Intubate     Diet    Follow this diet upon discharge: Orders Placed This Encounter      Combination Diet Regular Diet Adult; 2 gm NA Diet       Significant Results and Procedures   Most Recent 3 CBC's:  Recent Labs   Lab Test 10/28/20  0540 10/27/20  1812 09/30/20  1024   WBC 4.3 3.4* 4.6   HGB 10.6* 10.2* 8.2*   MCV 68* 68* 66*   * 105* 122*     Most Recent 3 BMP's:  Recent Labs   Lab Test 10/30/20  0638 10/29/20  0532 10/28/20  1235 10/28/20  0540    138  --  138   POTASSIUM 3.8 4.1 4.5 4.1   CHLORIDE 101 102  --  104   CO2 32 29  --  23   BUN 39* 35*  --  30   CR 1.54* 1.63*  --  1.39*   ANIONGAP 3 7  --  11   AYALA 8.9 9.3  --  9.6   GLC 94 96  --  77   ,   Results for orders placed or performed during the hospital encounter of 10/27/20   XR Chest Port 1 View    Narrative    CHEST ONE VIEW PORTABLE    10/27/2020 6:30 PM     HISTORY: Pneumonia    COMPARISON: CT chest abdomen pelvis on 9/28/2020.      Impression    IMPRESSION: Single portable AP view of the chest was obtained. Post  surgical  changes of prosthetic aortic valve replacement with median  sternotomy wires and surgical clips. Left chest wall cardiac pacer is  also noted. Stable mild enlargement of the cardiac silhouette. Right  basilar airspace pulmonary opacities, worrisome for infection. No  significant pleural effusion or pneumothorax.    MARVA CLOUD MD   Echo Limited    Narrative    [id number]  [id number]  [id number]  [id number]^SADIQ^JERAMIE^JOHNATHAN           [facility]  Echocardiography Laboratory  [facility] [place] [address]        Name: SRIKANTH OBANDO  MRN: [id number]  : 1934  Study Date: 10/28/2020 10:50 AM  Age: 85 yrs  Gender: Male  Patient Location: [facility] [address]  Reason For Study: Edema, SOB  Ordering Physician: JERAMIE BABCOCK  Referring Physician: RANI ARTHUR  Performed By: Larry Mahoney     BSA: 1.8 m2  Height: 68 in  Weight: 154 lb  HR: 78  BP: 140/58 mmHg  _____________________________________________________________________________  __        Procedure  Limited Portable Echo Adult.  _____________________________________________________________________________  __        Interpretation Summary     s/p TAVR with 29mm [employer], implanted 2020  Prominent posterior paravalvular jet, smaller anterior paravalvular jet, seen  mainly in apical 5 chamber view. This degree of AR is new compared with most  recent study.  There is severe biatrial enlargement.  There is moderate to severe concentric left ventricular hypertrophy.  The visual ejection fraction is estimated at 55-60%.  There is a catheter/pacemaker lead seen in the right ventricle.  The right ventricle is mild to moderately dilated.  Right ventricular systolic pressure is elevated, consistent with moderate  pulmonary hypertension.  The rhythm was atrial fibrillation.  There is mild to moderate (1-2+) tricuspid regurgitation.  There is mild to moderate (1-2+) aortic  regurgitation.  _____________________________________________________________________________  __        Left Ventricle  The left ventricle is normal in size. There is moderate to severe concentric  left ventricular hypertrophy. The visual ejection fraction is estimated at 55-  60%.     Right Ventricle  There is a catheter/pacemaker lead seen in the right ventricle. The right  ventricle is mild to moderately dilated.     Atria  There is severe biatrial enlargement.     Mitral Valve  The mitral valve leaflets are mildly thickened. There is trace mitral  regurgitation.        Tricuspid Valve  The tricuspid valve is not well visualized, but is grossly normal. Right  ventricular systolic pressure is elevated, consistent with moderate pulmonary  hypertension. There is mild to moderate (1-2+) tricuspid regurgitation.     Aortic Valve  There is mild to moderate (1-2+) aortic regurgitation. The prosthetic aortic  valve is well-seated.     Pulmonic Valve  The pulmonic valve is not well seen, but is grossly normal.     Vessels  The aortic root is normal size. Normal size ascending aorta. The inferior vena  cava is normal.     Pericardium  There is no pericardial effusion.        Rhythm  The rhythm was atrial fibrillation.  _____________________________________________________________________________  __  MMode/2D Measurements & Calculations  IVSd: 1.8 cm     LVIDd: 4.7 cm  LVIDs: 3.2 cm  LVPWd: 1.5 cm  FS: 32.8 %  LV mass(C)d: 337.5 grams  LV mass(C)dI: 184.5 grams/m2  asc Aorta Diam: 3.6 cm  RWT: 0.64        Doppler Measurements & Calculations  Ao V2 max: 206.3 cm/sec  Ao max P.0 mmHg  Ao V2 mean: 121.9 cm/sec  Ao mean P.3 mmHg  Ao V2 VTI: 35.2 cm  LV V1 max P.0 mmHg  LV V1 max: 132.4 cm/sec  LV V1 VTI: 22.8 cm  TR max vishnu: 354.3 cm/sec  TR max P.2 mmHg  AV Vishnu Ratio (DI): 0.64              _____________________________________________________________________________  __        Report approved by: Jaxon Field  Billie 10/28/2020 01:37 PM            Discharge Medications   Current Discharge Medication List      START taking these medications    Details   torsemide (DEMADEX) 10 MG tablet Take 1 tablet (10 mg) by mouth daily  Qty: 30 tablet, Refills: 0    Comments: Future refills by PCP Dr. Dany Rodriguez MD with phone number [phone].  Associated Diagnoses: Acute congestive heart failure, unspecified heart failure type (H)         CONTINUE these medications which have NOT CHANGED    Details   albuterol (PROAIR HFA/PROVENTIL HFA/VENTOLIN HFA) 108 (90 Base) MCG/ACT inhaler Inhale 2 puffs into the lungs 4 times daily as needed for shortness of breath / dyspnea or wheezing    Comments: Pharmacy may dispense brand covered by insurance (Proair, or proventil or ventolin or generic albuterol inhaler)      aspirin (ASA) 81 MG EC tablet Take 81 mg by mouth daily      calcium carbonate 600 mg-vitamin D 400 units (CALTRATE) 600-400 MG-UNIT per tablet Take 1 tablet by mouth daily      dabigatran ANTICOAGULANT (PRADAXA) 150 MG capsule Take 1 capsule (150 mg) by mouth 2 times daily Store in original 's bottle or blister pack; use within 120 days of opening.  Qty: 180 capsule, Refills: 3    Associated Diagnoses: Coronary artery disease involving native coronary artery of native heart without angina pectoris      ferrous sulfate (IRON) 325 (65 Fe) MG tablet TAKE 1 TABLET(325 MG) BY MOUTH TWICE DAILY  Qty: 180 tablet, Refills: 0    Associated Diagnoses: Anemia, unspecified type      ipratropium - albuterol 0.5 mg/2.5 mg/3 mL (DUONEB) 0.5-2.5 (3) MG/3ML nebulization Inhale 1 vial (3 mLs) into the lungs 4 times daily  Qty: 360 mL, Refills: 11    Associated Diagnoses: Moderate persistent asthma, uncomplicated      lisinopril (ZESTRIL) 20 MG tablet Take 1 tablet (20 mg) by mouth daily  Qty: 90 tablet, Refills: 1    Associated Diagnoses: Severe aortic stenosis; Permanent atrial fibrillation (H); Thrombus of left atrial  appendage      meclizine (ANTIVERT) 12.5 MG tablet Take 1 tablet (12.5 mg) by mouth 3 times daily as needed for dizziness  Qty: 90 tablet, Refills: 1    Associated Diagnoses: Dizziness      melatonin 3 MG tablet Take 1 tablet (3 mg) by mouth nightly as needed for sleep  Qty: 30 tablet, Refills: 0    Associated Diagnoses: Insomnia, unspecified type      metoprolol tartrate (LOPRESSOR) 25 MG tablet Take 1 tablet (25 mg) by mouth 2 times daily  Qty: 180 tablet, Refills: 0    Associated Diagnoses: Hypertension goal BP (blood pressure) < 140/90      montelukast (SINGULAIR) 10 MG tablet TAKE 1 TABLET(10 MG) BY MOUTH DAILY  Qty: 90 tablet, Refills: 1    Comments: **Patient requests 90 days supply**  Associated Diagnoses: Moderate persistent asthma      multivitamin, therapeutic (THERA-VIT) TABS tablet Take 1 tablet by mouth every evening       nitroGLYcerin (NITROSTAT) 0.4 MG sublingual tablet For chest pain place 1 tablet under the tongue every 5 minutes for 3 doses. If symptoms persist 5 minutes after 1st dose call 911.  Qty: 12 tablet, Refills: 0    Comments: Future refills by PCP Dr. Dany Rodriguez MD with phone number [phone].  Associated Diagnoses: Severe aortic stenosis; Complete heart block (H); Permanent atrial fibrillation (H); Thrombus of left atrial appendage      pantoprazole (PROTONIX) 40 MG EC tablet TAKE ONE TABLET BY MOUTH TWICE A DAY BEFORE MEALS  Qty: 60 tablet, Refills: 3    Associated Diagnoses: Ulcer of esophagus with bleeding      simvastatin (ZOCOR) 40 MG tablet TAKE 1 TABLET(40 MG) BY MOUTH AT BEDTIME  Qty: 90 tablet, Refills: 1    Associated Diagnoses: Hyperlipidemia with target LDL less than 100      budesonide (PULMICORT) 0.5 MG/2ML nebulizer solution Take 2 mLs (0.5 mg) by nebulization 2 times daily  Qty: 120 mL, Refills: 0    Associated Diagnoses: Moderate persistent asthma           Allergies   No Known Allergies

## 2020-10-30 NOTE — PROGRESS NOTES
Care Management Discharge Note    Discharge Planning:  Expected Discharge Date: 10/30/20     Concerns to be Addressed: all concerns addressed in this encounter       Anticipated Discharge Disposition:  home  Anticipated Discharge Services:  none  Anticipated Discharge DME:  none    Patient/family educated on Medicare website which has current facility and service quality ratings: no  Referrals Placed by CM/SW:  N/A  Education Provided on the Discharge Plan:  yes  Patient/Family in Agreement with the Plan: yes     Disposition Comments:  discharge home with son    Selected Continued Care - Admitted Since 10/27/2020    No services have been selected for the patient.         Additional Information: Follow up with PCP and [facility] clinic scheduled and on AVS.      Perlita Villa RN  Care Coordinator  [facility]  [phone]

## 2020-10-30 NOTE — PLAN OF CARE
A&Ox4, Deaf, Sign language. VSS on RA. Tele:AFib with BBB occasionally paced.  Up SBA, ambulated in room & clifford. 2 gram Na diet. C/o 5/10 back pain, managed with PRN Tylenol and heat pack. C/o minimal SOB but improving per pt. LS clear. BLE edmea, improving per pt. +2 ankles, +1 legs. Voiding urinal. IV-SL. Core clinic consult. Discharge pending, possible today after transition to PO lasix. Continue to monitor.         Heart Failure Care Map  GOALS TO BE MET BEFORE DISCHARGE:    1. Decrease congestion and/or edema with diuretic therapy to achieve near optimal volume status.     Dyspnea improved: Yes, satisfactory for discharge.   Edema improved: Yes, satisfactory for discharge.        Net I/O and Weights since admission:   09/30 0700 - 10/30 0659  In: 2440 [P.O.:2440]  Out: 7305 [Urine:7305]  Net: -4865     Vitals:    10/27/20 2158 10/29/20 0246 10/30/20 0321   Weight: 70 kg (154 lb 4.8 oz) 65.3 kg (144 lb) 64.7 kg (142 lb 11.2 oz)       2.  O2 sats > 90% on room air, or at prior home O2 therapy level.      Able to wean O2 this shift to keep sats above 90%?: Yes, satisfactory for discharge.   Does patient use Home O2? No          Current oxygenation status:   SpO2: 95 %     O2 Device: None (Room air),      3.  Tolerates ambulation and mobility near baseline.     Ambulation: Yes, satisfactory for discharge.   Times patient ambulated with staff this shift: 3    Please review the Heart Failure Care Map for additional HF goal outcomes.    Irma Sullivan, JOSE  10/30/2020

## 2020-10-30 NOTE — PLAN OF CARE
VSS, not in pain at time of discharge. Pt states all belongings and paperwork are accounted for. Medications have been filled at in house pharmacy and are with pt at time of discharge. Discharge education complete including meds, follow up and all instructions. CHF education gone over.  present for discharge teaching.  No further questions on discharge information. Family to pick him up at door 6 and drive pt home.

## 2020-11-02 ENCOUNTER — TELEPHONE (OUTPATIENT)
Dept: CARDIOLOGY | Facility: CLINIC | Age: 85
End: 2020-11-02

## 2020-11-02 NOTE — TELEPHONE ENCOUNTER
Patient was evaluated by cardiology while inpatient for increased SOB, BLE edema and orthopnea-CHF exacerbation. PMH: CAD with CABG, aortic stenosis with TAVR 7/2020, CHB with PPM, chronic anemia, chronic A Fib with PVC's-Pradaxa, HTN, HLD, CKD III. IV Lasix diuresed 10 lbs with improved symptoms. Discharged on Demadex and will f/u with [facility]. Called patient's son, Forrest as per demographics sheet, to discuss any post hospital d/c questions, review discharge medication, and confirm f/u appts. Son denied any questions regarding new or changes to PTA medications. Patient has denied any SOB, chest pain, or light headedness. Mild lower leg swelling-son will encourage pt to keep legs elevated when sitting up. RN confirmed with son patient that  has an OV scheduled with DIAZ Amee Rocha on 11/4/20 at 1410 for [facility] return at our [place] [facility], with labs prior. Son states pt weighs himself and checks BP daily and will bring these results and medications with to f/u appt. Son advised to call clinic with any cardiac related questions or concerns prior to his appt, and he verbalized understanding and agreed with plan. JAVID Ding RN.

## 2020-11-03 ENCOUNTER — PATIENT OUTREACH (OUTPATIENT)
Dept: CARE COORDINATION | Facility: CLINIC | Age: 85
End: 2020-11-03

## 2020-11-03 NOTE — PROGRESS NOTES
Clinic Care Coordination Contact  Care Team Conversations    Left message with pt's son. Pt's son will call back later to get pt scheduled for an initial CC appointment.     NOY Ortega  Clinic Care Coordinator   [facility]  [phone]

## 2020-11-04 ENCOUNTER — OFFICE VISIT (OUTPATIENT)
Dept: CARDIOLOGY | Facility: CLINIC | Age: 85
End: 2020-11-04
Attending: PHYSICIAN ASSISTANT
Payer: COMMERCIAL

## 2020-11-04 ENCOUNTER — TELEPHONE (OUTPATIENT)
Dept: FAMILY MEDICINE | Facility: CLINIC | Age: 85
End: 2020-11-04

## 2020-11-04 VITALS
SYSTOLIC BLOOD PRESSURE: 142 MMHG | WEIGHT: 145.9 LBS | DIASTOLIC BLOOD PRESSURE: 62 MMHG | HEIGHT: 68 IN | OXYGEN SATURATION: 93 % | HEART RATE: 56 BPM | BODY MASS INDEX: 22.11 KG/M2

## 2020-11-04 DIAGNOSIS — I50.31 ACUTE DIASTOLIC HEART FAILURE (H): Primary | ICD-10-CM

## 2020-11-04 DIAGNOSIS — I50.9 ACUTE CONGESTIVE HEART FAILURE, UNSPECIFIED HEART FAILURE TYPE (H): ICD-10-CM

## 2020-11-04 LAB
ANION GAP SERPL CALCULATED.3IONS-SCNC: 11 MMOL/L (ref 6–17)
BUN SERPL-MCNC: 44 MG/DL (ref 7–30)
CALCIUM SERPL-MCNC: 9.8 MG/DL (ref 8.5–10.5)
CHLORIDE SERPL-SCNC: 100 MMOL/L (ref 98–107)
CO2 SERPL-SCNC: 32 MMOL/L (ref 23–29)
CREAT SERPL-MCNC: 1.79 MG/DL (ref 0.7–1.3)
GFR SERPL CREATININE-BSD FRML MDRD: 36 ML/MIN/{1.73_M2}
GLUCOSE SERPL-MCNC: 115 MG/DL (ref 70–105)
NT-PROBNP SERPL-MCNC: 3042 PG/ML (ref 0–450)
POTASSIUM SERPL-SCNC: 4 MMOL/L (ref 3.5–5.1)
SODIUM SERPL-SCNC: 139 MMOL/L (ref 136–145)

## 2020-11-04 PROCEDURE — 80048 BASIC METABOLIC PNL TOTAL CA: CPT | Performed by: PHYSICIAN ASSISTANT

## 2020-11-04 PROCEDURE — 36415 COLL VENOUS BLD VENIPUNCTURE: CPT | Performed by: PHYSICIAN ASSISTANT

## 2020-11-04 PROCEDURE — 99214 OFFICE O/P EST MOD 30 MIN: CPT | Performed by: PHYSICIAN ASSISTANT

## 2020-11-04 PROCEDURE — T1013 SIGN LANG/ORAL INTERPRETER: HCPCS | Mod: U3 | Performed by: INTERNAL MEDICINE

## 2020-11-04 PROCEDURE — 83880 ASSAY OF NATRIURETIC PEPTIDE: CPT | Performed by: PHYSICIAN ASSISTANT

## 2020-11-04 RX ORDER — TORSEMIDE 5 MG/1
5 TABLET ORAL DAILY
Qty: 30 TABLET | Refills: 5 | Status: ON HOLD | OUTPATIENT
Start: 2020-11-04 | End: 2020-12-16

## 2020-11-04 ASSESSMENT — MIFFLIN-ST. JEOR: SCORE: 1321.3

## 2020-11-04 NOTE — PROGRESS NOTES
Clinic Care Coordination Contact  Care Team Conversations    SW'er spoke with pt's son. He stated that his father was doing well. He reported that he is going to be purchasing his father a lift recliner this weekend to help him get out easier. Pt's son stated that pt is getting the help he needs and thanked ALEXer for reaching out.     No further outreaches will be made.     NOY Ortega  Clinic Care Coordinator   [facility]  [phone]

## 2020-11-04 NOTE — PATIENT INSTRUCTIONS
Today, we discussed the following:   - Results: The kidneys are looking a little dehydrated.   - Medication changes:  Reduce the torsemide from 10 to 5 mg daily.   - Follow up: In one week with repeat labs.    Call me in the meantime if your weight goes > 145 lbs at home.     Please, remember to continue the followin.  Weigh yourself daily. Call if your weight is up > than 2 pounds in one day, or 5 pounds in one week; if you feel more short of breath or have worsening swelling in your legs or abdomen.  2.  Eat a low sodium diet (less than 2,000mg or 2g daily). If you eat less salt, you will retain less fluid.   3.  Avoid alcohol, as this can worsen heart failure.   4.  Avoid NSAIDs as able (For example, Ibuprofen / aleve / advil / naprosen / diclofenac).    Please call [facility] nurse for any questions or concerns Mon-Fri 8am-4pm:   [phone]  For concerns after hours:   [phone] option 2   Scheduling phone number:   [phone]    Thank you for visiting with us today.   Amee Rocha PA-C  ______________________________________________________________      Component      Latest Ref Rng & Units 10/30/2020 2020   Sodium      136 - 145 mmol/L 136 139   Potassium      3.5 - 5.1 mmol/L 3.8 4.0   Chloride      98 - 107 mmol/L 101 100   Carbon Dioxide      23 - 29 mmol/L 32 32 (H)   Anion Gap      6 - 17 mmol/L 3 11   Glucose      70 - 105 mg/dL 94 115 (H)   Urea Nitrogen      7 - 30 mg/dL 39 (H) 44 (H)   Creatinine      0.70 - 1.30 mg/dL 1.54 (H) 1.79 (H)   GFR Estimate      >60 mL/min/1.73:m2 40 (L) 36 (L)   GFR Estimate If Black      >60 mL/min/1.73:m2 47 (L) 44 (L)   Calcium      8.5 - 10.5 mg/dL 8.9 9.8

## 2020-11-04 NOTE — LETTER
11/4/2020    Dany Rodriguez MD, MD  [address] [place] [id number]    RE: Shiraz Ingram       Dear Colleague,    I had the pleasure of seeing Shiraz Ingram in the [facility].    Cardiology Clinic Progress Note    Shiraz Ingram MRN# [id number]   YOB: 1934 Age: 85 year old   Primary cardiologist: Dr. Field         Assessment and Plan:     In summary, Shiraz Ingram presents today for a hospital follow up and CORE clinic follow up visit. His symptoms are well-controlled on torsemide 10 mg daily; however, his labs appear pre-renal.     Plan:  - Reduce torsemide from 10 to 5 mg daily. He has a tenuous volume status as evidenced by many readmissions for acute heart failure; however, I think that as long as we keep him on some form of maintenance diuretic and monitor closely, he should do okay. He will continue routine BP and weight monitoring at home, and will call me if his weight goes up to >145 lbs. In that case, I would re-increase his torsemide to 10 daily and further reduce his lisinopril to 10 daily.     Follow-up:  - One week + BMP.    It was a pleasure seeing Shiraz, as always!        History of Presenting Illness:      Shiraz Ingram is a pleasant 85 year old patient who presents today with an  for a hospital follow up visit.    He has a pertinent medical history of the following -  # Chronic HFpEF in the setting of VHD  # Severe aortic stenosis s/p TAVR 7/2020, complicated by CHB requiring PPM implant  # History of stable CAD s/p CABG in 2002 [LIMA -LAD, VG-OM and the RCA], with known moderate touchdown lesion of the right PDA branch of the [id number] Y-graft; patent LIMA to LAD, vein graft to the OM, and right posterior lateral Y graft branch as of last angiogram in 4/2020.   # CAF, with hx of MAYA thrombus, on warfarin.  # Hx of upper GI bleed in May 2020, treated with cautery, on PPI  # HTN  # Asthma  # CKD-III    Unfortunately, since I  "last saw him, Shiraz has had multiple admissions. In July, he ununderwent TAVR with a 29 mm Medtronic valve with successful reduction valve gradients to 5. His procedure was complicated by CHB, requiring PPM implantation. His diuretics were stopped. Several days post-discharge, he returned to the hospital with acute HFpEF. TTE was stable. He was diuresed and placed back on oral furosemide. When he saw Daksha Smith in follow-up in August, he appeared stable. He was then admitted end of September with pneumonia and RD, and his furosemide was again stopped in the hospital. This resulted in another admission for acute HFpEF in October. He was again diuresed and ultimately discharged on torsemide 10 mg daily. Also, his PTA lisinopril was reduced from 20 BID to daily, due to acute kidney injury.     Today, he's feeling significantly better. Tells me nothing makes him dyspneic. His prior leg and abdominal bloating has resolved. He's sleeping well at night. His home weight is stable at 142.5 lbs. Patient denies chest pain, shortness of breath, PND, orthopnea, edema, claudication, palpitations, near syncope or syncope.   Labs do appear dehydrated today, with a creatinine that has risen from 1.54 --> 1.79, BUN from 39 --> 44. Of interest, his proBNP is also pretty elevated ~3,000 with no priors for comparison. Exam is not suggestive of volume overload. His BP in clinic is mildly elevated, however recordings at home have ranged from the upper 110's - 130's systolic at home.          Review of Systems:     12-pt ROS is negative except for as noted in the HPI.          Physical Exam:     Vitals: BP (!) 142/62   Pulse 56   Ht 1.727 m (5' 8\")   Wt 66.2 kg (145 lb 14.4 oz)   SpO2 93%   BMI 22.18 kg/m    Wt Readings from Last 10 Encounters:   11/04/20 66.2 kg (145 lb 14.4 oz)   10/30/20 64.7 kg (142 lb 11.2 oz)   10/27/20 69.4 kg (153 lb)   10/12/20 71.8 kg (158 lb 3.2 oz)   10/01/20 68.1 kg (150 lb 1.6 oz)   09/22/20 71 kg (156 " lb 9.6 oz)   09/17/20 68 kg (150 lb)   09/09/20 72.1 kg (159 lb)   08/13/20 68.5 kg (151 lb)   08/05/20 67.6 kg (149 lb)       Constitutional:  Patient is pleasant, alert, cooperative, and in NAD.  HEENT:  NCAT. PERRLA. EOM's intact.   Neck:  CVP appears normal. No carotid bruits.   Pulmonary: Normal respiratory effort. CTAB.   Cardiac: RRR, normal S1/S2, no S3/S4, no murmur or rub.   Abdomen:  Non-tender abdomen, no hepatosplenomegaly appreciated.   Vascular: Pulses in the upper and lower extremities are 2+ and equal bilaterally.  Extremities: No edema, erythema, cyanosis or tenderness appreciated.  Skin:  No rashes or lesions appreciated.   Neurological:  No gross motor or sensory deficits.   Psych: Appropriate affect.        Data:     Labs reviewed:  Recent Labs   Lab Test 11/04/20  1325 09/30/20  1024 06/01/20  0907 06/12/19  0818 06/12/19  0818 12/12/18  0840 02/21/13  1702 02/21/13  1702   LDL  --   --  54  --  74 63   < >  --    HDL  --   --  48  --  46 52   < >  --    NHDL  --   --  71  --  90 77   < >  --    CHOL  --   --  119  --  136 129   < >  --    TRIG  --   --  87  --  82 71   < >  --    TSH  --   --   --   --   --   --   --  1.66   NTBNP 3,042*  --   --   --   --   --   --   --    IRON  --  27*  --    < > 117  --    < > <10*   FEB  --  245  --    < > 312  --    < > 219*   IRONSAT  --  11*  --    < > 38  --    < > <5*   RJ  --  801*  --    < > 602*  --    < > 2,750*    < > = values in this interval not displayed.       Lab Results   Component Value Date    WBC 4.3 10/28/2020    RBC 5.04 10/28/2020    HGB 10.6 (L) 10/28/2020    HCT 34.3 (L) 10/28/2020    MCV 68 (L) 10/28/2020    MCH 21.0 (L) 10/28/2020    MCHC 30.9 (L) 10/28/2020    RDW 19.2 (H) 10/28/2020     (L) 10/28/2020       Lab Results   Component Value Date     11/04/2020    POTASSIUM 4.0 11/04/2020    CHLORIDE 100 11/04/2020    CO2 32 (H) 11/04/2020    ANIONGAP 11 11/04/2020     (H) 11/04/2020    BUN 44 (H) 11/04/2020     CR 1.79 (H) 11/04/2020    GFRESTIMATED 36 (L) 11/04/2020    GFRESTBLACK 44 (L) 11/04/2020    AYALA 9.8 11/04/2020      Lab Results   Component Value Date    AST 16 09/28/2020    ALT 19 09/28/2020       No results found for: A1C    Lab Results   Component Value Date    INR 1.21 (H) 07/13/2020    INR 1.25 (H) 06/03/2020           Problem List:     Patient Active Problem List   Diagnosis     Coronary atherosclerosis     Benign neoplasm of colon     Localized osteoarthrosis, lower leg     Allergic rhinitis     Moderate persistent asthma     Anemia     Osteoporosis     Hypertension goal BP (blood pressure) < 140/90     Hyperlipidemia with target LDL less than 100     CKD (chronic kidney disease) stage 3, GFR 30-59 ml/min     Nonrheumatic aortic valve stenosis     Advanced directives, counseling/discussion     Iron deficiency anemia     Vitamin B 12 deficiency     Health Care Home     Mild persistent asthma without complication     Acute respiratory failure (H)     Acute on chronic congestive heart failure, unspecified heart failure type (H)     Severe aortic stenosis     Coronary artery disease involving native coronary artery of native heart without angina pectoris     S/P CABG (coronary artery bypass graft)     Hyperkalemia     GI bleed     Pneumonia     Atrial fibrillation (H)     Thrombus of left atrial appendage     Complete heart block (H)     Aortic stenosis, severe     Acute exacerbation of CHF (congestive heart failure) (H)     Community acquired pneumonia     Acute congestive heart failure, unspecified heart failure type (H)     History of transcatheter aortic valve replacement (TAVR)           Medications:     Current Outpatient Medications   Medication Sig Dispense Refill     aspirin (ASA) 81 MG EC tablet Take 81 mg by mouth daily       budesonide (PULMICORT) 0.5 MG/2ML nebulizer solution Take 2 mLs (0.5 mg) by nebulization 2 times daily 120 mL 0     calcium carbonate 600 mg-vitamin D 400 units (CALTRATE)  600-400 MG-UNIT per tablet Take 1 tablet by mouth daily       dabigatran ANTICOAGULANT (PRADAXA) 150 MG capsule Take 1 capsule (150 mg) by mouth 2 times daily Store in original 's bottle or blister pack; use within 120 days of opening. 180 capsule 3     ferrous sulfate (IRON) 325 (65 Fe) MG tablet TAKE 1 TABLET(325 MG) BY MOUTH TWICE DAILY 180 tablet 0     ipratropium - albuterol 0.5 mg/2.5 mg/3 mL (DUONEB) 0.5-2.5 (3) MG/3ML nebulization Inhale 1 vial (3 mLs) into the lungs 4 times daily 360 mL 11     lisinopril (ZESTRIL) 20 MG tablet Take 1 tablet (20 mg) by mouth daily 90 tablet 1     meclizine (ANTIVERT) 12.5 MG tablet Take 1 tablet (12.5 mg) by mouth 3 times daily as needed for dizziness 90 tablet 1     melatonin 3 MG tablet Take 1 tablet (3 mg) by mouth nightly as needed for sleep 30 tablet 0     metoprolol tartrate (LOPRESSOR) 25 MG tablet Take 1 tablet (25 mg) by mouth 2 times daily 180 tablet 0     montelukast (SINGULAIR) 10 MG tablet TAKE 1 TABLET(10 MG) BY MOUTH DAILY 90 tablet 1     multivitamin, therapeutic (THERA-VIT) TABS tablet Take 1 tablet by mouth every evening        nitroGLYcerin (NITROSTAT) 0.4 MG sublingual tablet For chest pain place 1 tablet under the tongue every 5 minutes for 3 doses. If symptoms persist 5 minutes after 1st dose call 911. 12 tablet 0     pantoprazole (PROTONIX) 40 MG EC tablet TAKE ONE TABLET BY MOUTH TWICE A DAY BEFORE MEALS 60 tablet 3     simvastatin (ZOCOR) 40 MG tablet TAKE 1 TABLET(40 MG) BY MOUTH AT BEDTIME 90 tablet 1     torsemide (DEMADEX) 5 MG tablet Take 1 tablet (5 mg) by mouth daily 30 tablet 5     albuterol (PROAIR HFA/PROVENTIL HFA/VENTOLIN HFA) 108 (90 Base) MCG/ACT inhaler INHALE 1 TO 2 PUFFS BY MOUTH EVERY 4 TO 6 HOURS AS NEEDED 18 g 2           Past Medical History:     Past Medical History:   Diagnosis Date     Allergic rhinitis, cause unspecified      Anemia, unspecified      Aortic stenosis     severe     Benign neoplasm of colon 1999     Ademonatous polyp     CKD (chronic kidney disease) stage 3, GFR 30-59 ml/min 05/12/2011     Coronary atherosclerosis of unspecified type of vessel, native or graft     s/p CABG 2002     Hyperlipidemia LDL goal < 100      Hypertension goal BP (blood pressure) < 140/90      Localized osteoarthrosis not specified whether primary or secondary, lower leg     left knee     Moderate persistent asthma      Pacemaker      Persistent atrial fibrillation (H)      Unspecified hearing loss      Past Surgical History:   Procedure Laterality Date     CARDIAC SURGERY       COLONOSCOPY N/A 5/26/2020    Procedure: COLONOSCOPY;  Surgeon: Ignacio Fournier MD;  Location:  GI     CV ANGIOGRAM CORONARY GRAFT N/A 4/24/2020    Procedure: Angiogram Coronary Graft;  Surgeon: Addison Willard MD;  Location:  HEART CARDIAC CATH LAB     CV CORONARY ANGIOGRAM N/A 4/24/2020    Procedure: Coronary Angiogram;  Surgeon: Addison Willard MD;  Location:  HEART CARDIAC CATH LAB     CV RIGHT HEART CATH N/A 4/24/2020    Procedure: Right Heart Cath;  Surgeon: Addison Willard MD;  Location:  HEART CARDIAC CATH LAB     CV TRANSCATHETER AORTIC VALVE REPLACEMENT N/A 7/14/2020    Procedure: Transcatheter Aortic Valve Replacement;  Surgeon: Soraida Monet MD;  Location:  HEART CARDIAC CATH LAB     EP PACEMAKER N/A 7/15/2020    Procedure: EP Pacemaker;  Surgeon: Tommie Sauer MD;  Location:  HEART CARDIAC CATH LAB     HC COLONOSCOPY THRU STOMA W BIOPSY/CAUTERY TUMOR/POLYP/LESION  5/03    Dr. Dow, Q 5 years     HC COLONOSCOPY THRU STOMA W BIOPSY/CAUTERY TUMOR/POLYP/LESION  12/199    Dr. Dow, one adenomatous polyp     HC ESOPHAGOSCOPY, DIAGNOSTIC  5/03    Dr. Dow, lower esophageal ring & mild gastritis     HERNIORRHAPHY INGUINAL Right 9/26/2016    Procedure: HERNIORRHAPHY INGUINAL;  Surgeon: Alexis Alexandra MD;  Location: [facility]     LAPAROSCOPIC CHOLECYSTECTOMY  12/03    for biliary sludge     [facility]  NONSPECIFIC PROCEDURE      Coronary artery bypass     Family History   Problem Relation Age of Onset     C.A.D. Father      Cancer Mother         breast cancer,  of pneumonia     Cerebrovascular Disease Son      CABG Son      Family History Negative Child      Family History Negative Sister      Heart Disease Brother      Social History     Socioeconomic History     Marital status:      Spouse name: Kailey     Number of children: 3     Years of education: Not on file     Highest education level: Not on file   Occupational History     Occupation: [employer]     Employer: RETIRED   Social Needs     Financial resource strain: Not on file     Food insecurity     Worry: Not on file     Inability: Not on file     Transportation needs     Medical: Not on file     Non-medical: Not on file   Tobacco Use     Smoking status: Former Smoker     Types: Cigarettes     Smokeless tobacco: Never Used     Tobacco comment: smoked for a week in 's   Substance and Sexual Activity     Alcohol use: Yes     Alcohol/week: 0.0 standard drinks     Drug use: No     Sexual activity: Yes     Partners: Female   Lifestyle     Physical activity     Days per week: 4 days     Minutes per session: 30 min     Stress: Not on file   Relationships     Social connections     Talks on phone: Not on file     Gets together: Not on file     Attends Baptism service: Not on file     Active member of club or organization: Not on file     Attends meetings of clubs or organizations: Not on file     Relationship status: Not on file     Intimate partner violence     Fear of current or ex partner: Not on file     Emotionally abused: Not on file     Physically abused: Not on file     Forced sexual activity: Not on file   Other Topics Concern      Service No     Blood Transfusions No     Caffeine Concern Not Asked     Occupational Exposure Not Asked     Hobby Hazards Not Asked     Sleep Concern Not Asked     Stress Concern Not Asked     Weight  Concern Not Asked     Special Diet Not Asked     Back Care Not Asked     Exercise Yes     Bike Helmet Not Asked     Seat Belt Yes     Self-Exams No     Parent/sibling w/ CABG, MI or angioplasty before 65F 55M? Yes   Social History Narrative    Balanced Diet - Yes    Osteoporosis Preventative measures-  Dairy servings per day: 2 to 3 servings daily    Regular Exercise -  Yes Describe walks 1.5 mile daily     Dental Exam up - YES - Date: 2 years ago    Eye Exam - YES - Date: 1 year ago    Self Testicular Exam -  No, handout given    Do you have any concerns about STD's -  No    Abuse: Current or Past (Physical, Sexual or Emotional)- No    Do you feel safe in your environment - Yes    Guns stored in the home - Yes, locked    Sunscreen used - No    Seatbelts used - Yes    Lipids - YES - Date: 3-09    Glucose -  YES - Date: 3-09    Colon Cancer Screening - Colonoscopy 3-08(date completed)    Hemoccults - UNKNOWN    PSA - YES - Date: 11-07    Digital Rectal Exam - UNKNOWN    Immunizations reviewed and up to date - Yes, td 1-2005, had shingles vaccine    5-28-09  JANEY Patel CMA                           Allergies:   Patient has no known allergies.      Amee Rocha PA-C  [facility]  Pager: [phone]        Thank you for allowing me to participate in the care of your patient.    Sincerely,     Amee Rocha PA-C     [facility]

## 2020-11-04 NOTE — PROGRESS NOTES
Cardiology Clinic Progress Note    Shiraz Ingram MRN# [id number]   YOB: 1934 Age: 85 year old   Primary cardiologist: Dr. Field         Assessment and Plan:     In summary, Shiraz Ingram presents today for a hospital follow up and CORE clinic follow up visit. His symptoms are well-controlled on torsemide 10 mg daily; however, his labs appear pre-renal.     Plan:  - Reduce torsemide from 10 to 5 mg daily. He has a tenuous volume status as evidenced by many readmissions for acute heart failure; however, I think that as long as we keep him on some form of maintenance diuretic and monitor closely, he should do okay. He will continue routine BP and weight monitoring at home, and will call me if his weight goes up to >145 lbs. In that case, I would re-increase his torsemide to 10 daily and further reduce his lisinopril to 10 daily.     Follow-up:  - One week + BMP.    It was a pleasure seeing Shiraz, as always!        History of Presenting Illness:      Shiraz Ingram is a pleasant 85 year old patient who presents today with an  for a hospital follow up visit.    He has a pertinent medical history of the following -  # Chronic HFpEF in the setting of VHD  # Severe aortic stenosis s/p TAVR 7/2020, complicated by CHB requiring PPM implant  # History of stable CAD s/p CABG in 2002 [LIMA -LAD, VG-OM and the RCA], with known moderate touchdown lesion of the right PDA branch of the TVD-xETI-sDK Y-graft; patent LIMA to LAD, vein graft to the OM, and right posterior lateral Y graft branch as of last angiogram in 4/2020.   # CAF, with hx of MAYA thrombus, on warfarin.  # Hx of upper GI bleed in May 2020, treated with cautery, on PPI  # HTN  # Asthma  # CKD-III    Unfortunately, since I last saw him, Shiraz has had multiple admissions. In July, he ununderwent TAVR with a 29 mm Medtronic valve with successful reduction valve gradients to 5. His procedure was complicated by CHB, requiring PPM implantation. His  "diuretics were stopped. Several days post-discharge, he returned to the hospital with acute HFpEF. TTE was stable. He was diuresed and placed back on oral furosemide. When he saw Daksha Smith in follow-up in August, he appeared stable. He was then admitted end of September with pneumonia and RD, and his furosemide was again stopped in the hospital. This resulted in another admission for acute HFpEF in October. He was again diuresed and ultimately discharged on torsemide 10 mg daily. Also, his PTA lisinopril was reduced from 20 BID to daily, due to acute kidney injury.     Today, he's feeling significantly better. Tells me nothing makes him dyspneic. His prior leg and abdominal bloating has resolved. He's sleeping well at night. His home weight is stable at 142.5 lbs. Patient denies chest pain, shortness of breath, PND, orthopnea, edema, claudication, palpitations, near syncope or syncope.   Labs do appear dehydrated today, with a creatinine that has risen from 1.54 --> 1.79, BUN from 39 --> 44. Of interest, his proBNP is also pretty elevated ~3,000 with no priors for comparison. Exam is not suggestive of volume overload. His BP in clinic is mildly elevated, however recordings at home have ranged from the upper 110's - 130's systolic at home.          Review of Systems:     12-pt ROS is negative except for as noted in the HPI.          Physical Exam:     Vitals: BP (!) 142/62   Pulse 56   Ht 1.727 m (5' 8\")   Wt 66.2 kg (145 lb 14.4 oz)   SpO2 93%   BMI 22.18 kg/m    Wt Readings from Last 10 Encounters:   11/04/20 66.2 kg (145 lb 14.4 oz)   10/30/20 64.7 kg (142 lb 11.2 oz)   10/27/20 69.4 kg (153 lb)   10/12/20 71.8 kg (158 lb 3.2 oz)   10/01/20 68.1 kg (150 lb 1.6 oz)   09/22/20 71 kg (156 lb 9.6 oz)   09/17/20 68 kg (150 lb)   09/09/20 72.1 kg (159 lb)   08/13/20 68.5 kg (151 lb)   08/05/20 67.6 kg (149 lb)       Constitutional:  Patient is pleasant, alert, cooperative, and in NAD.  HEENT:  NCAT. PERADELE. " EOM's intact.   Neck:  CVP appears normal. No carotid bruits.   Pulmonary: Normal respiratory effort. CTAB.   Cardiac: RRR, normal S1/S2, no S3/S4, no murmur or rub.   Abdomen:  Non-tender abdomen, no hepatosplenomegaly appreciated.   Vascular: Pulses in the upper and lower extremities are 2+ and equal bilaterally.  Extremities: No edema, erythema, cyanosis or tenderness appreciated.  Skin:  No rashes or lesions appreciated.   Neurological:  No gross motor or sensory deficits.   Psych: Appropriate affect.        Data:     Labs reviewed:  Recent Labs   Lab Test 11/04/20  1325 09/30/20  1024 06/01/20  0907 06/12/19  0818 06/12/19  0818 12/12/18  0840 02/21/13  1702 02/21/13  1702   LDL  --   --  54  --  74 63   < >  --    HDL  --   --  48  --  46 52   < >  --    NHDL  --   --  71  --  90 77   < >  --    CHOL  --   --  119  --  136 129   < >  --    TRIG  --   --  87  --  82 71   < >  --    TSH  --   --   --   --   --   --   --  1.66   NTBNP 3,042*  --   --   --   --   --   --   --    IRON  --  27*  --    < > 117  --    < > <10*   FEB  --  245  --    < > 312  --    < > 219*   IRONSAT  --  11*  --    < > 38  --    < > <5*   RJ  --  801*  --    < > 602*  --    < > 2,750*    < > = values in this interval not displayed.       Lab Results   Component Value Date    WBC 4.3 10/28/2020    RBC 5.04 10/28/2020    HGB 10.6 (L) 10/28/2020    HCT 34.3 (L) 10/28/2020    MCV 68 (L) 10/28/2020    MCH 21.0 (L) 10/28/2020    MCHC 30.9 (L) 10/28/2020    RDW 19.2 (H) 10/28/2020     (L) 10/28/2020       Lab Results   Component Value Date     11/04/2020    POTASSIUM 4.0 11/04/2020    CHLORIDE 100 11/04/2020    CO2 32 (H) 11/04/2020    ANIONGAP 11 11/04/2020     (H) 11/04/2020    BUN 44 (H) 11/04/2020    CR 1.79 (H) 11/04/2020    GFRESTIMATED 36 (L) 11/04/2020    GFRESTBLACK 44 (L) 11/04/2020    AYALA 9.8 11/04/2020      Lab Results   Component Value Date    AST 16 09/28/2020    ALT 19 09/28/2020       No results found for:  A1C    Lab Results   Component Value Date    INR 1.21 (H) 07/13/2020    INR 1.25 (H) 06/03/2020           Problem List:     Patient Active Problem List   Diagnosis     Coronary atherosclerosis     Benign neoplasm of colon     Localized osteoarthrosis, lower leg     Allergic rhinitis     Moderate persistent asthma     Anemia     Osteoporosis     Hypertension goal BP (blood pressure) < 140/90     Hyperlipidemia with target LDL less than 100     CKD (chronic kidney disease) stage 3, GFR 30-59 ml/min     Nonrheumatic aortic valve stenosis     Advanced directives, counseling/discussion     Iron deficiency anemia     Vitamin B 12 deficiency     Health Care Home     Mild persistent asthma without complication     Acute respiratory failure (H)     Acute on chronic congestive heart failure, unspecified heart failure type (H)     Severe aortic stenosis     Coronary artery disease involving native coronary artery of native heart without angina pectoris     S/P CABG (coronary artery bypass graft)     Hyperkalemia     GI bleed     Pneumonia     Atrial fibrillation (H)     Thrombus of left atrial appendage     Complete heart block (H)     Aortic stenosis, severe     Acute exacerbation of CHF (congestive heart failure) (H)     Community acquired pneumonia     Acute congestive heart failure, unspecified heart failure type (H)     History of transcatheter aortic valve replacement (TAVR)           Medications:     Current Outpatient Medications   Medication Sig Dispense Refill     aspirin (ASA) 81 MG EC tablet Take 81 mg by mouth daily       budesonide (PULMICORT) 0.5 MG/2ML nebulizer solution Take 2 mLs (0.5 mg) by nebulization 2 times daily 120 mL 0     calcium carbonate 600 mg-vitamin D 400 units (CALTRATE) 600-400 MG-UNIT per tablet Take 1 tablet by mouth daily       dabigatran ANTICOAGULANT (PRADAXA) 150 MG capsule Take 1 capsule (150 mg) by mouth 2 times daily Store in original 's bottle or blister pack; use within  120 days of opening. 180 capsule 3     ferrous sulfate (IRON) 325 (65 Fe) MG tablet TAKE 1 TABLET(325 MG) BY MOUTH TWICE DAILY 180 tablet 0     ipratropium - albuterol 0.5 mg/2.5 mg/3 mL (DUONEB) 0.5-2.5 (3) MG/3ML nebulization Inhale 1 vial (3 mLs) into the lungs 4 times daily 360 mL 11     lisinopril (ZESTRIL) 20 MG tablet Take 1 tablet (20 mg) by mouth daily 90 tablet 1     meclizine (ANTIVERT) 12.5 MG tablet Take 1 tablet (12.5 mg) by mouth 3 times daily as needed for dizziness 90 tablet 1     melatonin 3 MG tablet Take 1 tablet (3 mg) by mouth nightly as needed for sleep 30 tablet 0     metoprolol tartrate (LOPRESSOR) 25 MG tablet Take 1 tablet (25 mg) by mouth 2 times daily 180 tablet 0     montelukast (SINGULAIR) 10 MG tablet TAKE 1 TABLET(10 MG) BY MOUTH DAILY 90 tablet 1     multivitamin, therapeutic (THERA-VIT) TABS tablet Take 1 tablet by mouth every evening        nitroGLYcerin (NITROSTAT) 0.4 MG sublingual tablet For chest pain place 1 tablet under the tongue every 5 minutes for 3 doses. If symptoms persist 5 minutes after 1st dose call 911. 12 tablet 0     pantoprazole (PROTONIX) 40 MG EC tablet TAKE ONE TABLET BY MOUTH TWICE A DAY BEFORE MEALS 60 tablet 3     simvastatin (ZOCOR) 40 MG tablet TAKE 1 TABLET(40 MG) BY MOUTH AT BEDTIME 90 tablet 1     torsemide (DEMADEX) 5 MG tablet Take 1 tablet (5 mg) by mouth daily 30 tablet 5     albuterol (PROAIR HFA/PROVENTIL HFA/VENTOLIN HFA) 108 (90 Base) MCG/ACT inhaler INHALE 1 TO 2 PUFFS BY MOUTH EVERY 4 TO 6 HOURS AS NEEDED 18 g 2           Past Medical History:     Past Medical History:   Diagnosis Date     Allergic rhinitis, cause unspecified      Anemia, unspecified      Aortic stenosis     severe     Benign neoplasm of colon 1999    Ademonatous polyp     CKD (chronic kidney disease) stage 3, GFR 30-59 ml/min 05/12/2011     Coronary atherosclerosis of unspecified type of vessel, native or graft     s/p CABG 2002     Hyperlipidemia LDL goal < 100       Hypertension goal BP (blood pressure) < 140/90      Localized osteoarthrosis not specified whether primary or secondary, lower leg     left knee     Moderate persistent asthma      Pacemaker      Persistent atrial fibrillation (H)      Unspecified hearing loss      Past Surgical History:   Procedure Laterality Date     CARDIAC SURGERY       COLONOSCOPY N/A 2020    Procedure: COLONOSCOPY;  Surgeon: Ignacio Fournier MD;  Location:  GI     CV ANGIOGRAM CORONARY GRAFT N/A 2020    Procedure: Angiogram Coronary Graft;  Surgeon: Addison Willard MD;  Location:  HEART CARDIAC CATH LAB     CV CORONARY ANGIOGRAM N/A 2020    Procedure: Coronary Angiogram;  Surgeon: Addison Willard MD;  Location:  HEART CARDIAC CATH LAB     CV RIGHT HEART CATH N/A 2020    Procedure: Right Heart Cath;  Surgeon: Addison Willard MD;  Location:  HEART CARDIAC CATH LAB     CV TRANSCATHETER AORTIC VALVE REPLACEMENT N/A 2020    Procedure: Transcatheter Aortic Valve Replacement;  Surgeon: Soraida Monet MD;  Location:  HEART CARDIAC CATH LAB     EP PACEMAKER N/A 7/15/2020    Procedure: EP Pacemaker;  Surgeon: Tommie Sauer MD;  Location:  HEART CARDIAC CATH LAB     HC COLONOSCOPY THRU STOMA W BIOPSY/CAUTERY TUMOR/POLYP/LESION      Dr. Dow, Q 5 years     HC COLONOSCOPY THRU STOMA W BIOPSY/CAUTERY TUMOR/POLYP/LESION      Dr. Dow, one adenomatous polyp     HC ESOPHAGOSCOPY, DIAGNOSTIC      Dr. Dow, lower esophageal ring & mild gastritis     HERNIORRHAPHY INGUINAL Right 2016    Procedure: HERNIORRHAPHY INGUINAL;  Surgeon: Alexis Alexandra MD;  Location: [facility]     LAPAROSCOPIC CHOLECYSTECTOMY      for biliary sludge     ZZ NONSPECIFIC PROCEDURE      Coronary artery bypass     Family History   Problem Relation Age of Onset     C.A.D. Father      Cancer Mother         breast cancer,  of pneumonia     Cerebrovascular Disease Son      CABG  Son      Family History Negative Child      Family History Negative Sister      Heart Disease Brother      Social History     Socioeconomic History     Marital status:      Spouse name: Kailey     Number of children: 3     Years of education: Not on file     Highest education level: Not on file   Occupational History     Occupation: [employer]     Employer: RETIRED   Social Needs     Financial resource strain: Not on file     Food insecurity     Worry: Not on file     Inability: Not on file     Transportation needs     Medical: Not on file     Non-medical: Not on file   Tobacco Use     Smoking status: Former Smoker     Types: Cigarettes     Smokeless tobacco: Never Used     Tobacco comment: smoked for a week in 20's   Substance and Sexual Activity     Alcohol use: Yes     Alcohol/week: 0.0 standard drinks     Drug use: No     Sexual activity: Yes     Partners: Female   Lifestyle     Physical activity     Days per week: 4 days     Minutes per session: 30 min     Stress: Not on file   Relationships     Social connections     Talks on phone: Not on file     Gets together: Not on file     Attends Caodaism service: Not on file     Active member of club or organization: Not on file     Attends meetings of clubs or organizations: Not on file     Relationship status: Not on file     Intimate partner violence     Fear of current or ex partner: Not on file     Emotionally abused: Not on file     Physically abused: Not on file     Forced sexual activity: Not on file   Other Topics Concern      Service No     Blood Transfusions No     Caffeine Concern Not Asked     Occupational Exposure Not Asked     Hobby Hazards Not Asked     Sleep Concern Not Asked     Stress Concern Not Asked     Weight Concern Not Asked     Special Diet Not Asked     Back Care Not Asked     Exercise Yes     Bike Helmet Not Asked     Seat Belt Yes     Self-Exams No     Parent/sibling w/ CABG, MI or angioplasty before 65F 55M? Yes   Social  History Narrative    Balanced Diet - Yes    Osteoporosis Preventative measures-  Dairy servings per day: 2 to 3 servings daily    Regular Exercise -  Yes Describe walks 1.5 mile daily     Dental Exam up - YES - Date: 2 years ago    Eye Exam - YES - Date: 1 year ago    Self Testicular Exam -  No, handout given    Do you have any concerns about STD's -  No    Abuse: Current or Past (Physical, Sexual or Emotional)- No    Do you feel safe in your environment - Yes    Guns stored in the home - Yes, locked    Sunscreen used - No    Seatbelts used - Yes    Lipids - YES - Date: 3-09    Glucose -  YES - Date: 3-09    Colon Cancer Screening - Colonoscopy 3-08(date completed)    Hemoccults - UNKNOWN    PSA - YES - Date: 11-07    Digital Rectal Exam - UNKNOWN    Immunizations reviewed and up to date - Yes, td 1-2005, had shingles vaccine    5-28-09  JANEY Patel CMA                           Allergies:   Patient has no known allergies.      Amee Rocha PA-C  [facility]  Pager: [phone]

## 2020-11-09 ENCOUNTER — OFFICE VISIT (OUTPATIENT)
Dept: FAMILY MEDICINE | Facility: CLINIC | Age: 85
End: 2020-11-09
Payer: COMMERCIAL

## 2020-11-09 VITALS
SYSTOLIC BLOOD PRESSURE: 134 MMHG | TEMPERATURE: 99.3 F | DIASTOLIC BLOOD PRESSURE: 62 MMHG | RESPIRATION RATE: 18 BRPM | WEIGHT: 145 LBS | HEART RATE: 73 BPM | OXYGEN SATURATION: 95 % | BODY MASS INDEX: 22.05 KG/M2

## 2020-11-09 DIAGNOSIS — Z20.822 EXPOSURE TO COVID-19 VIRUS: Primary | ICD-10-CM

## 2020-11-09 DIAGNOSIS — I50.9 ACUTE CONGESTIVE HEART FAILURE, UNSPECIFIED HEART FAILURE TYPE (H): ICD-10-CM

## 2020-11-09 PROCEDURE — T1013 SIGN LANG/ORAL INTERPRETER: HCPCS | Mod: U3 | Performed by: FAMILY MEDICINE

## 2020-11-09 PROCEDURE — 99495 TRANSJ CARE MGMT MOD F2F 14D: CPT | Performed by: FAMILY MEDICINE

## 2020-11-09 NOTE — PATIENT INSTRUCTIONS
Mr Ingram - your son will help you to schedule an appointment for covid testing.     Please call  [phone] for covid test. He has this number.     We have to reschedule your hospital follow up visit.     You need to isolate away from your son and if you are getting worse - you have to go to hospital.     I am hoping you recover quickly.

## 2020-11-09 NOTE — PROGRESS NOTES
Subjective     Shiraz Ingram is a 85 year old male who presents to clinic today for the following health issues:    [facility]      Hospital Follow-up Visit:    Hospital/Nursing Home/IP Rehab Facility: [facility]  Date of Admission: 10/27/2020  Date of Discharge: 10/30/2020  Reason(s) for Admission: Acute on chronic heart failure with preserved EF  History of aortic stenosis s/p TAVR in July 2020  Hypertensive urgency on admission, resolved  Hypertension  Hyperlipidemia  CKD-III  A.fib s/p pacemaker placement  Asthma      Was your hospitalization related to COVID-19? No   Problems taking medications regularly:  None  Medication changes since discharge: See updated medication list  Problems adhering to non-medication therapy:  None    Summary of hospitalization:  [facility] discharge summary reviewed  Diagnostic Tests/Treatments reviewed.  Follow up needed: none  Other Healthcare Providers Involved in Patient s Care:         cardiology  Update since discharge: fluctuating course.       Post Discharge Medication Reconciliation: discharge medications reconciled, continue medications without change.  Plan of care communicated with patient and family over phone.               We used a  for this visit.  Patient was hospitalized with significant dyspnea and found to have acute congestive heart failure.  Was started on water pill.  Been to cardiologist and they cut down the dose after his discharge.  His breathing is much better and overall feeling good.  His son just recently got diagnosed with Covid and his son thinks this happened when he visited patient in the hospital.  Patient is also having some low-grade fever and loss of test.  Patient believes that it is from his water pill.    Review of Systems   Limited due to hearing issues but negative except noted above for respiratory and cardiac.       Objective    /62   Pulse 73   Temp 99.3  F (37.4  C) (Oral)   Resp 18    Wt 65.8 kg (145 lb)   SpO2 95%   BMI 22.05 kg/m    Body mass index is 22.05 kg/m .  Physical Exam   GENERAL: elderly, alert and no distress  PSYCH: mentation appears normal, affect normal/bright  Due to covid exposure risk - other exam was limited.     Assessment & Plan     Exposure to COVID-19 virus  I talked to son over phone and he recently got diagnosed with Covid.  Patient has some symptoms and recent hospitalization.  We will order Covid test for him. I talked to son about coordinating his covid test and he understood and aware of process.  Son is currently quarantining himself and he has a plan for his father (patient)s quarantining too.   -Discussed with son that due to patient's age and multiple comorbidities he might need to bring him to emergency room with worsening of his symptoms.  He understood and he was planning to do that anyhow.  -I talked to patient and I provided him written instructions and asked him to follow isolation guidelines while he is waiting for Covid test and its result.   - Symptomatic COVID-19 Virus (Coronavirus) by PCR; Future    Acute congestive heart failure, unspecified heart failure type (H)  Continue low-dose of water pill prescribed by cardiology.  We will hold off on BMP today especially due to above.     Dany Rodriguez MD, MD  [facility]

## 2020-11-11 ENCOUNTER — CARE COORDINATION (OUTPATIENT)
Dept: CARDIOLOGY | Facility: CLINIC | Age: 85
End: 2020-11-11

## 2020-11-11 NOTE — PROGRESS NOTES
[facility]  Received VM from pt asking for a call back. No other information left. Attempted to call pt at phone number left, but no answer. He also said we could call his son Willi back. Called son Willi, he stated pt has an appt on 11/13 with ALICJA Landis for follow up. Son Willi has COVID, as does his brother and girlfriend whom live in same house, they all live with pt. They are trying to self isolate as much as they can. Pt has been doing fairly well per son Willi. He went in for PCP visit 11/9 and noted to have mild cough, and fever, pt was not even aware, and sent him home. He is being tested on Saturday. Son wanting to know if pt should still come to CORE DIAZ Visit in clinic on 11/13. Told son pt should NOT come to clinic visit 11/13. Pt is very [place] and cannot do telephone visits. Son is isolating away from him so cannot help do telephone visit. Son states he is almost up on his 14 days and can go back to work next week. He asked if we can just reschedule appointment out 2 weeks or so. Son again reports pt is doing fairly well. Told son I was going to mikki 11/13 CORE DIAZ Visit. I would ask scheduling to call him to reschedule 2 weeks out, and then we may need to push it out further depending on results. Son stated understanding. Son reports he started having sx last Wednesday 11/6. I asked son if he was in clinic on 11/4 with pt, and he said he did bring pt to clinic.     FYI to ALICJA Landis.     Rhoda Hager, KATHLEENN, RN, CHFN  11/11/20 at 3:16 PM

## 2020-11-12 NOTE — PROGRESS NOTES
Thanks Ginna, sorry to hear this. Hoping for a speedy recovery for all. Appropriate to schedule out two weeks. Hoping that he will call with weight gain, as instructed, in the meantime.

## 2020-11-14 DIAGNOSIS — Z20.822 EXPOSURE TO COVID-19 VIRUS: ICD-10-CM

## 2020-11-14 PROCEDURE — U0003 INFECTIOUS AGENT DETECTION BY NUCLEIC ACID (DNA OR RNA); SEVERE ACUTE RESPIRATORY SYNDROME CORONAVIRUS 2 (SARS-COV-2) (CORONAVIRUS DISEASE [COVID-19]), AMPLIFIED PROBE TECHNIQUE, MAKING USE OF HIGH THROUGHPUT TECHNOLOGIES AS DESCRIBED BY CMS-2020-01-R: HCPCS | Performed by: FAMILY MEDICINE

## 2020-11-14 NOTE — LETTER
November 17, 2020      Shiraz PALOMARES [place]  [address]  [facility] [id number]        Dear ,    We are writing to inform you of your test results.    Your covid test is positive. I left a voicemail to Forrest.   As you are following all the isolation precaution, I would like you to continue same.   Feel better soon.     Resulted Orders   Symptomatic COVID-19 Virus (Coronavirus) by PCR   Result Value Ref Range    COVID-19 Virus PCR to [facility] - Source Nasopharyngeal     COVID-19 Virus PCR to [facility] - Result Detected, Abnormal Result (AA)       Comment:      Positive for 2019-nCoV.  Patient sample was heat inactivated and amplified using the HDPCR SARS-CoV-2   assay ([employer] The HDPCRTM SARS-CoV-2 assay is a reverse   transcription real-time polymerase chain reaction (qRT-PCR) test intended for   the qualitative detection of nucleic acid  from SARS-CoV-2 in human nasopharyngeal swabs, oropharyngeal swabs, anterior   nasal swabs, mid-turbinate nasal swabs as well as nasal aspirate, nasal wash,   and bronchoalveolar lavage (BAL) specimens from individuals who are suspected   of COVID-19 by their healthcare provider.  Positive results should also be reported in accordance with local, state, and   federal regulations.  Nasopharyngeal specimen is the preferred choice for swab-based SARS CoV2   testing. When collection of a nasopharyngeal swab is not possible the   following are acceptable alternatives:  an oropharyngeal (OP) specimen collected by a healthcare professional, or a   nasal mid-turbinate (NMT) swab collected by a healthcar e professional or by   onsite self-collection (using a flocked tapered swab), or an anterior nares   specimen collected by a healthcare professional or by onsite self-collection   (using a round foam swab). (Centers for Disease Control)  Testing performed by [facility] ([facility]) [address]  [facility] [address]  The test performance characteristics were determined by [facility]. It has not been   cleared or approved by the FDA.  The laboratory is regulated under the Clinical Laboratory Improvement   Amendments of 1988 (CLIA-88) as qualified to perform high-complexity testing.   This test is used for clinical purposes. It should not be regarded as   investigational or for research.         If you have any questions or concerns, please call the clinic at the number listed above.       Sincerely,    Mathew Rodriguez MD/alisia

## 2020-11-15 LAB
SARS-COV-2 RNA SPEC QL NAA+PROBE: ABNORMAL
SPECIMEN SOURCE: ABNORMAL

## 2020-11-19 NOTE — PROGRESS NOTES
Amee Rocha PAC recs that pt present to ED for K level of 6.6.     Called pt's son Willi who agreed to take pt to ED.     Will check status tomorrow and adjust CORE follow-up PRN.    Elly Osman RN BSN   12:36 PM 05/11/20         Additional Notes: Patient consent was obtained to proceed with the visit and recommended plan of care after discussion of all risks and benefits, including the risks of COVID-19 exposure. Detail Level: Simple

## 2020-11-30 DIAGNOSIS — I51.3 THROMBUS OF LEFT ATRIAL APPENDAGE: ICD-10-CM

## 2020-11-30 DIAGNOSIS — I35.0 SEVERE AORTIC STENOSIS: ICD-10-CM

## 2020-11-30 DIAGNOSIS — I48.21 PERMANENT ATRIAL FIBRILLATION (H): ICD-10-CM

## 2020-12-03 ENCOUNTER — ANCILLARY PROCEDURE (OUTPATIENT)
Dept: CARDIOLOGY | Facility: CLINIC | Age: 85
End: 2020-12-03
Attending: INTERNAL MEDICINE
Payer: COMMERCIAL

## 2020-12-03 DIAGNOSIS — Z95.0 CARDIAC PACEMAKER IN SITU: ICD-10-CM

## 2020-12-03 DIAGNOSIS — I49.5 SSS (SICK SINUS SYNDROME) (H): ICD-10-CM

## 2020-12-03 PROCEDURE — 93294 REM INTERROG EVL PM/LDLS PM: CPT | Performed by: INTERNAL MEDICINE

## 2020-12-03 PROCEDURE — 93296 REM INTERROG EVL PM/IDS: CPT | Performed by: INTERNAL MEDICINE

## 2020-12-03 RX ORDER — LISINOPRIL 20 MG/1
20 TABLET ORAL DAILY
Qty: 90 TABLET | Refills: 1 | OUTPATIENT
Start: 2020-12-03

## 2020-12-03 NOTE — TELEPHONE ENCOUNTER
For lisinopril request Message sent to pharmacy - Refusal reason: Should already have refills on file (SEE ORDER SENT 8/5/2020 FOR 6 MONTH SUPPLY TO [place] [id number].).  Brennan GARCIA

## 2020-12-08 ENCOUNTER — INFUSION THERAPY VISIT (OUTPATIENT)
Dept: INFUSION THERAPY | Facility: CLINIC | Age: 85
End: 2020-12-08
Attending: INTERNAL MEDICINE
Payer: COMMERCIAL

## 2020-12-08 ENCOUNTER — HOSPITAL ENCOUNTER (OUTPATIENT)
Facility: CLINIC | Age: 85
Setting detail: SPECIMEN
Discharge: HOME | End: 2020-12-08
Attending: INTERNAL MEDICINE | Admitting: INTERNAL MEDICINE
Payer: COMMERCIAL

## 2020-12-08 DIAGNOSIS — D61.818 PANCYTOPENIA (H): ICD-10-CM

## 2020-12-08 LAB
ANION GAP SERPL CALCULATED.3IONS-SCNC: 1 MMOL/L (ref 3–14)
BASOPHILS # BLD AUTO: 0 10E9/L (ref 0–0.2)
BASOPHILS NFR BLD AUTO: 1 %
BUN SERPL-MCNC: 28 MG/DL (ref 7–30)
CALCIUM SERPL-MCNC: 9.3 MG/DL (ref 8.5–10.1)
CHLORIDE SERPL-SCNC: 107 MMOL/L (ref 94–109)
CO2 SERPL-SCNC: 31 MMOL/L (ref 20–32)
CREAT SERPL-MCNC: 1.42 MG/DL (ref 0.66–1.25)
DIFFERENTIAL METHOD BLD: ABNORMAL
EOSINOPHIL # BLD AUTO: 0.5 10E9/L (ref 0–0.7)
EOSINOPHIL NFR BLD AUTO: 13 %
ERYTHROCYTE [DISTWIDTH] IN BLOOD BY AUTOMATED COUNT: 18.7 % (ref 10–15)
FERRITIN SERPL-MCNC: 607 NG/ML (ref 26–388)
GFR SERPL CREATININE-BSD FRML MDRD: 44 ML/MIN/{1.73_M2}
GLUCOSE SERPL-MCNC: 87 MG/DL (ref 70–99)
HCT VFR BLD AUTO: 34.3 % (ref 40–53)
HGB BLD-MCNC: 10.9 G/DL (ref 13.3–17.7)
IRON SATN MFR SERPL: 28 % (ref 15–46)
IRON SERPL-MCNC: 89 UG/DL (ref 35–180)
LYMPHOCYTES # BLD AUTO: 0.8 10E9/L (ref 0.8–5.3)
LYMPHOCYTES NFR BLD AUTO: 22 %
MCH RBC QN AUTO: 21.6 PG (ref 26.5–33)
MCHC RBC AUTO-ENTMCNC: 31.8 G/DL (ref 31.5–36.5)
MCV RBC AUTO: 68 FL (ref 78–100)
MDC_IDC_EPISODE_DTM: NORMAL
MDC_IDC_EPISODE_DURATION: 2 S
MDC_IDC_EPISODE_ID: 2
MDC_IDC_EPISODE_TYPE: NORMAL
MDC_IDC_LEAD_IMPLANT_DT: NORMAL
MDC_IDC_LEAD_LOCATION: NORMAL
MDC_IDC_LEAD_LOCATION_DETAIL_1: NORMAL
MDC_IDC_LEAD_MFG: NORMAL
MDC_IDC_LEAD_MODEL: NORMAL
MDC_IDC_LEAD_POLARITY_TYPE: NORMAL
MDC_IDC_LEAD_SERIAL: NORMAL
MDC_IDC_MSMT_BATTERY_DTM: NORMAL
MDC_IDC_MSMT_BATTERY_REMAINING_LONGEVITY: 180 MO
MDC_IDC_MSMT_BATTERY_RRT_TRIGGER: 2.62
MDC_IDC_MSMT_BATTERY_STATUS: NORMAL
MDC_IDC_MSMT_BATTERY_VOLTAGE: 3.2 V
MDC_IDC_MSMT_LEADCHNL_RV_IMPEDANCE_VALUE: 266 OHM
MDC_IDC_MSMT_LEADCHNL_RV_IMPEDANCE_VALUE: 513 OHM
MDC_IDC_MSMT_LEADCHNL_RV_PACING_THRESHOLD_AMPLITUDE: 1 V
MDC_IDC_MSMT_LEADCHNL_RV_PACING_THRESHOLD_PULSEWIDTH: 0.4 MS
MDC_IDC_MSMT_LEADCHNL_RV_SENSING_INTR_AMPL: 7.88 MV
MDC_IDC_MSMT_LEADCHNL_RV_SENSING_INTR_AMPL: 7.88 MV
MDC_IDC_PG_IMPLANT_DTM: NORMAL
MDC_IDC_PG_MFG: NORMAL
MDC_IDC_PG_MODEL: NORMAL
MDC_IDC_PG_SERIAL: NORMAL
MDC_IDC_PG_TYPE: NORMAL
MDC_IDC_SESS_CLINIC_NAME: NORMAL
MDC_IDC_SESS_DTM: NORMAL
MDC_IDC_SESS_TYPE: NORMAL
MDC_IDC_SET_BRADY_HYSTRATE: NORMAL
MDC_IDC_SET_BRADY_LOWRATE: 50 {BEATS}/MIN
MDC_IDC_SET_BRADY_MODE: NORMAL
MDC_IDC_SET_LEADCHNL_RV_PACING_AMPLITUDE: 2.25 V
MDC_IDC_SET_LEADCHNL_RV_PACING_ANODE_ELECTRODE_1: NORMAL
MDC_IDC_SET_LEADCHNL_RV_PACING_ANODE_LOCATION_1: NORMAL
MDC_IDC_SET_LEADCHNL_RV_PACING_CAPTURE_MODE: NORMAL
MDC_IDC_SET_LEADCHNL_RV_PACING_CATHODE_ELECTRODE_1: NORMAL
MDC_IDC_SET_LEADCHNL_RV_PACING_CATHODE_LOCATION_1: NORMAL
MDC_IDC_SET_LEADCHNL_RV_PACING_POLARITY: NORMAL
MDC_IDC_SET_LEADCHNL_RV_PACING_PULSEWIDTH: 0.4 MS
MDC_IDC_SET_LEADCHNL_RV_SENSING_ANODE_ELECTRODE_1: NORMAL
MDC_IDC_SET_LEADCHNL_RV_SENSING_ANODE_LOCATION_1: NORMAL
MDC_IDC_SET_LEADCHNL_RV_SENSING_CATHODE_ELECTRODE_1: NORMAL
MDC_IDC_SET_LEADCHNL_RV_SENSING_CATHODE_LOCATION_1: NORMAL
MDC_IDC_SET_LEADCHNL_RV_SENSING_POLARITY: NORMAL
MDC_IDC_SET_LEADCHNL_RV_SENSING_SENSITIVITY: 0.9 MV
MDC_IDC_SET_ZONE_DETECTION_INTERVAL: 360 MS
MDC_IDC_SET_ZONE_TYPE: NORMAL
MDC_IDC_STAT_BRADY_DTM_END: NORMAL
MDC_IDC_STAT_BRADY_DTM_START: NORMAL
MDC_IDC_STAT_BRADY_RV_PERCENT_PACED: 17.69 %
MDC_IDC_STAT_EPISODE_RECENT_COUNT: 0
MDC_IDC_STAT_EPISODE_RECENT_COUNT: 1
MDC_IDC_STAT_EPISODE_RECENT_COUNT_DTM_END: NORMAL
MDC_IDC_STAT_EPISODE_RECENT_COUNT_DTM_END: NORMAL
MDC_IDC_STAT_EPISODE_RECENT_COUNT_DTM_START: NORMAL
MDC_IDC_STAT_EPISODE_RECENT_COUNT_DTM_START: NORMAL
MDC_IDC_STAT_EPISODE_TOTAL_COUNT: 0
MDC_IDC_STAT_EPISODE_TOTAL_COUNT: 2
MDC_IDC_STAT_EPISODE_TOTAL_COUNT_DTM_END: NORMAL
MDC_IDC_STAT_EPISODE_TOTAL_COUNT_DTM_END: NORMAL
MDC_IDC_STAT_EPISODE_TOTAL_COUNT_DTM_START: NORMAL
MDC_IDC_STAT_EPISODE_TOTAL_COUNT_DTM_START: NORMAL
MDC_IDC_STAT_EPISODE_TYPE: NORMAL
MONOCYTES # BLD AUTO: 0.3 10E9/L (ref 0–1.3)
MONOCYTES NFR BLD AUTO: 8 %
NEUTROPHILS # BLD AUTO: 2.2 10E9/L (ref 1.6–8.3)
NEUTROPHILS NFR BLD AUTO: 56 %
OVALOCYTES BLD QL SMEAR: SLIGHT
PLATELET # BLD AUTO: 136 10E9/L (ref 150–450)
PLATELET # BLD EST: ABNORMAL 10*3/UL
POTASSIUM SERPL-SCNC: 4.8 MMOL/L (ref 3.4–5.3)
RBC # BLD AUTO: 5.04 10E12/L (ref 4.4–5.9)
RBC INCLUSIONS BLD: SLIGHT
SODIUM SERPL-SCNC: 139 MMOL/L (ref 133–144)
TARGETS BLD QL SMEAR: ABNORMAL
TIBC SERPL-MCNC: 316 UG/DL (ref 240–430)
WBC # BLD AUTO: 3.8 10E9/L (ref 4–11)

## 2020-12-08 PROCEDURE — 83550 IRON BINDING TEST: CPT | Performed by: INTERNAL MEDICINE

## 2020-12-08 PROCEDURE — 82728 ASSAY OF FERRITIN: CPT | Mod: GZ | Performed by: INTERNAL MEDICINE

## 2020-12-08 PROCEDURE — T1013 SIGN LANG/ORAL INTERPRETER: HCPCS | Mod: U3

## 2020-12-08 PROCEDURE — 83540 ASSAY OF IRON: CPT | Mod: GZ | Performed by: INTERNAL MEDICINE

## 2020-12-08 PROCEDURE — 99207 PR NO CHARGE NURSE ONLY: CPT

## 2020-12-08 PROCEDURE — 36415 COLL VENOUS BLD VENIPUNCTURE: CPT

## 2020-12-08 PROCEDURE — 80048 BASIC METABOLIC PNL TOTAL CA: CPT | Performed by: INTERNAL MEDICINE

## 2020-12-08 PROCEDURE — 85025 COMPLETE CBC W/AUTO DIFF WBC: CPT | Performed by: INTERNAL MEDICINE

## 2020-12-08 NOTE — PROGRESS NOTES
Medical Assistant Note:    Shiraz Ingram presents today for blood draw.    Patient seen by provider today: No.   present during visit today: Yes, Language: ASL.     Concerns: No Concerns.    Procedure:  Lab draw site: lac, Needle type: bf, Gauge: 23.    Post Assessment:  Labs drawn without difficulty: Yes.    Discharge Plan:  Departure Mode: Ambulatory.    Face to Face Time: 5 minutes.    Tyra Moreno CMA  12/8/2020      10:01 AM

## 2020-12-09 ENCOUNTER — TELEPHONE (OUTPATIENT)
Dept: ONCOLOGY | Facility: CLINIC | Age: 85
End: 2020-12-09

## 2020-12-15 ENCOUNTER — HOSPITAL ENCOUNTER (INPATIENT)
Facility: CLINIC | Age: 85
LOS: 1 days | Discharge: HOME | DRG: 291 | End: 2020-12-16
Attending: EMERGENCY MEDICINE | Admitting: HOSPITALIST
Payer: COMMERCIAL

## 2020-12-15 ENCOUNTER — OFFICE VISIT (OUTPATIENT)
Dept: INTERPRETER SERVICES | Facility: CLINIC | Age: 85
End: 2020-12-15
Payer: COMMERCIAL

## 2020-12-15 ENCOUNTER — APPOINTMENT (OUTPATIENT)
Dept: CARDIOLOGY | Facility: CLINIC | Age: 85
DRG: 291 | End: 2020-12-15
Attending: HOSPITALIST
Payer: COMMERCIAL

## 2020-12-15 ENCOUNTER — APPOINTMENT (OUTPATIENT)
Dept: GENERAL RADIOLOGY | Facility: CLINIC | Age: 85
DRG: 291 | End: 2020-12-15
Attending: EMERGENCY MEDICINE
Payer: COMMERCIAL

## 2020-12-15 DIAGNOSIS — I50.9 ACUTE ON CHRONIC CONGESTIVE HEART FAILURE, UNSPECIFIED HEART FAILURE TYPE (H): ICD-10-CM

## 2020-12-15 LAB
ANION GAP SERPL CALCULATED.3IONS-SCNC: 3 MMOL/L (ref 3–14)
BASOPHILS # BLD AUTO: 0 10E9/L (ref 0–0.2)
BASOPHILS NFR BLD AUTO: 0.8 %
BUN SERPL-MCNC: 29 MG/DL (ref 7–30)
CALCIUM SERPL-MCNC: 8.9 MG/DL (ref 8.5–10.1)
CHLORIDE SERPL-SCNC: 105 MMOL/L (ref 94–109)
CO2 SERPL-SCNC: 30 MMOL/L (ref 20–32)
CREAT SERPL-MCNC: 1.5 MG/DL (ref 0.66–1.25)
DIFFERENTIAL METHOD BLD: ABNORMAL
EOSINOPHIL # BLD AUTO: 0.3 10E9/L (ref 0–0.7)
EOSINOPHIL NFR BLD AUTO: 8.9 %
ERYTHROCYTE [DISTWIDTH] IN BLOOD BY AUTOMATED COUNT: 18.9 % (ref 10–15)
FLUAV+FLUBV RNA SPEC QL NAA+PROBE: NEGATIVE
FLUAV+FLUBV RNA SPEC QL NAA+PROBE: NEGATIVE
GFR SERPL CREATININE-BSD FRML MDRD: 42 ML/MIN/{1.73_M2}
GLUCOSE SERPL-MCNC: 111 MG/DL (ref 70–99)
HCT VFR BLD AUTO: 30.2 % (ref 40–53)
HGB BLD-MCNC: 9.2 G/DL (ref 13.3–17.7)
IMM GRANULOCYTES # BLD: 0 10E9/L (ref 0–0.4)
IMM GRANULOCYTES NFR BLD: 0.3 %
LABORATORY COMMENT REPORT: NORMAL
LYMPHOCYTES # BLD AUTO: 0.6 10E9/L (ref 0.8–5.3)
LYMPHOCYTES NFR BLD AUTO: 16.5 %
MCH RBC QN AUTO: 20.5 PG (ref 26.5–33)
MCHC RBC AUTO-ENTMCNC: 30.5 G/DL (ref 31.5–36.5)
MCV RBC AUTO: 67 FL (ref 78–100)
MONOCYTES # BLD AUTO: 0.3 10E9/L (ref 0–1.3)
MONOCYTES NFR BLD AUTO: 6.5 %
NEUTROPHILS # BLD AUTO: 2.6 10E9/L (ref 1.6–8.3)
NEUTROPHILS NFR BLD AUTO: 67 %
NRBC # BLD AUTO: 0 10*3/UL
NRBC BLD AUTO-RTO: 0 /100
NT-PROBNP SERPL-MCNC: 8916 PG/ML (ref 0–1800)
PLATELET # BLD AUTO: 121 10E9/L (ref 150–450)
POTASSIUM SERPL-SCNC: 3.8 MMOL/L (ref 3.4–5.3)
POTASSIUM SERPL-SCNC: 3.8 MMOL/L (ref 3.4–5.3)
RBC # BLD AUTO: 4.48 10E12/L (ref 4.4–5.9)
RSV RNA SPEC QL NAA+PROBE: NEGATIVE
SARS-COV-2 RNA SPEC QL NAA+PROBE: NEGATIVE
SODIUM SERPL-SCNC: 138 MMOL/L (ref 133–144)
SPECIMEN SOURCE: NORMAL
TROPONIN I SERPL-MCNC: 0.02 UG/L (ref 0–0.04)
TROPONIN I SERPL-MCNC: 0.03 UG/L (ref 0–0.04)
TROPONIN I SERPL-MCNC: 0.04 UG/L (ref 0–0.04)
WBC # BLD AUTO: 3.8 10E9/L (ref 4–11)

## 2020-12-15 PROCEDURE — 87636 SARSCOV2 & INF A&B AMP PRB: CPT | Performed by: EMERGENCY MEDICINE

## 2020-12-15 PROCEDURE — 94640 AIRWAY INHALATION TREATMENT: CPT

## 2020-12-15 PROCEDURE — T1013 SIGN LANG/ORAL INTERPRETER: HCPCS | Mod: U3

## 2020-12-15 PROCEDURE — 250N000013 HC RX MED GY IP 250 OP 250 PS 637: Performed by: NURSE PRACTITIONER

## 2020-12-15 PROCEDURE — 999N000208 ECHOCARDIOGRAM COMPLETE

## 2020-12-15 PROCEDURE — 250N000011 HC RX IP 250 OP 636: Performed by: NURSE PRACTITIONER

## 2020-12-15 PROCEDURE — 999N000157 HC STATISTIC RCP TIME EA 10 MIN

## 2020-12-15 PROCEDURE — 250N000009 HC RX 250: Performed by: INTERNAL MEDICINE

## 2020-12-15 PROCEDURE — 85025 COMPLETE CBC W/AUTO DIFF WBC: CPT | Performed by: EMERGENCY MEDICINE

## 2020-12-15 PROCEDURE — C9803 HOPD COVID-19 SPEC COLLECT: HCPCS

## 2020-12-15 PROCEDURE — 84132 ASSAY OF SERUM POTASSIUM: CPT | Performed by: INTERNAL MEDICINE

## 2020-12-15 PROCEDURE — 93005 ELECTROCARDIOGRAM TRACING: CPT

## 2020-12-15 PROCEDURE — 210N000002 HC R&B HEART CARE

## 2020-12-15 PROCEDURE — 250N000011 HC RX IP 250 OP 636: Performed by: EMERGENCY MEDICINE

## 2020-12-15 PROCEDURE — 96374 THER/PROPH/DIAG INJ IV PUSH: CPT

## 2020-12-15 PROCEDURE — 83880 ASSAY OF NATRIURETIC PEPTIDE: CPT | Performed by: EMERGENCY MEDICINE

## 2020-12-15 PROCEDURE — 250N000013 HC RX MED GY IP 250 OP 250 PS 637: Performed by: INTERNAL MEDICINE

## 2020-12-15 PROCEDURE — 250N000013 HC RX MED GY IP 250 OP 250 PS 637: Performed by: HOSPITALIST

## 2020-12-15 PROCEDURE — 84484 ASSAY OF TROPONIN QUANT: CPT | Performed by: HOSPITALIST

## 2020-12-15 PROCEDURE — 93306 TTE W/DOPPLER COMPLETE: CPT | Mod: 26 | Performed by: INTERNAL MEDICINE

## 2020-12-15 PROCEDURE — 99222 1ST HOSP IP/OBS MODERATE 55: CPT | Mod: 25 | Performed by: INTERNAL MEDICINE

## 2020-12-15 PROCEDURE — 80048 BASIC METABOLIC PNL TOTAL CA: CPT | Performed by: EMERGENCY MEDICINE

## 2020-12-15 PROCEDURE — 71045 X-RAY EXAM CHEST 1 VIEW: CPT

## 2020-12-15 PROCEDURE — 255N000002 HC RX 255 OP 636: Performed by: HOSPITALIST

## 2020-12-15 PROCEDURE — 250N000011 HC RX IP 250 OP 636: Performed by: HOSPITALIST

## 2020-12-15 PROCEDURE — 36415 COLL VENOUS BLD VENIPUNCTURE: CPT | Performed by: INTERNAL MEDICINE

## 2020-12-15 PROCEDURE — 99223 1ST HOSP IP/OBS HIGH 75: CPT | Mod: AI | Performed by: HOSPITALIST

## 2020-12-15 PROCEDURE — 94640 AIRWAY INHALATION TREATMENT: CPT | Mod: 76

## 2020-12-15 PROCEDURE — 36415 COLL VENOUS BLD VENIPUNCTURE: CPT | Performed by: HOSPITALIST

## 2020-12-15 PROCEDURE — 99285 EMERGENCY DEPT VISIT HI MDM: CPT | Mod: 25

## 2020-12-15 PROCEDURE — 93010 ELECTROCARDIOGRAM REPORT: CPT | Performed by: INTERNAL MEDICINE

## 2020-12-15 PROCEDURE — 84484 ASSAY OF TROPONIN QUANT: CPT | Performed by: EMERGENCY MEDICINE

## 2020-12-15 RX ORDER — ASPIRIN 81 MG/1
81 TABLET ORAL DAILY
Status: DISCONTINUED | OUTPATIENT
Start: 2020-12-16 | End: 2020-12-16 | Stop reason: HOSPADM

## 2020-12-15 RX ORDER — BUDESONIDE 0.5 MG/2ML
0.5 INHALANT ORAL 2 TIMES DAILY
Status: DISCONTINUED | OUTPATIENT
Start: 2020-12-15 | End: 2020-12-16 | Stop reason: HOSPADM

## 2020-12-15 RX ORDER — MULTIVITAMIN,THERAPEUTIC
1 TABLET ORAL EVERY EVENING
Status: DISCONTINUED | OUTPATIENT
Start: 2020-12-15 | End: 2020-12-16 | Stop reason: HOSPADM

## 2020-12-15 RX ORDER — LANOLIN ALCOHOL/MO/W.PET/CERES
3 CREAM (GRAM) TOPICAL
Status: DISCONTINUED | OUTPATIENT
Start: 2020-12-15 | End: 2020-12-16 | Stop reason: HOSPADM

## 2020-12-15 RX ORDER — ACETAMINOPHEN 325 MG/1
650 TABLET ORAL EVERY 4 HOURS PRN
Status: DISCONTINUED | OUTPATIENT
Start: 2020-12-15 | End: 2020-12-16 | Stop reason: HOSPADM

## 2020-12-15 RX ORDER — MONTELUKAST SODIUM 10 MG/1
10 TABLET ORAL DAILY
Status: DISCONTINUED | OUTPATIENT
Start: 2020-12-16 | End: 2020-12-16 | Stop reason: HOSPADM

## 2020-12-15 RX ORDER — MECLIZINE HCL 12.5 MG 12.5 MG/1
12.5 TABLET ORAL 3 TIMES DAILY PRN
Status: DISCONTINUED | OUTPATIENT
Start: 2020-12-15 | End: 2020-12-16 | Stop reason: HOSPADM

## 2020-12-15 RX ORDER — PANTOPRAZOLE SODIUM 40 MG/1
40 TABLET, DELAYED RELEASE ORAL
Status: DISCONTINUED | OUTPATIENT
Start: 2020-12-15 | End: 2020-12-16 | Stop reason: HOSPADM

## 2020-12-15 RX ORDER — FUROSEMIDE 10 MG/ML
60 INJECTION INTRAMUSCULAR; INTRAVENOUS EVERY 8 HOURS
Status: COMPLETED | OUTPATIENT
Start: 2020-12-15 | End: 2020-12-15

## 2020-12-15 RX ORDER — LIDOCAINE 40 MG/G
CREAM TOPICAL
Status: DISCONTINUED | OUTPATIENT
Start: 2020-12-15 | End: 2020-12-16 | Stop reason: HOSPADM

## 2020-12-15 RX ORDER — AMOXICILLIN 250 MG
2 CAPSULE ORAL 2 TIMES DAILY PRN
Status: DISCONTINUED | OUTPATIENT
Start: 2020-12-15 | End: 2020-12-16 | Stop reason: HOSPADM

## 2020-12-15 RX ORDER — LABETALOL HYDROCHLORIDE 5 MG/ML
20 INJECTION, SOLUTION INTRAVENOUS ONCE
Status: DISCONTINUED | OUTPATIENT
Start: 2020-12-15 | End: 2020-12-15

## 2020-12-15 RX ORDER — DABIGATRAN ETEXILATE 150 MG/1
150 CAPSULE ORAL 2 TIMES DAILY
Status: DISCONTINUED | OUTPATIENT
Start: 2020-12-15 | End: 2020-12-16 | Stop reason: HOSPADM

## 2020-12-15 RX ORDER — ONDANSETRON 2 MG/ML
4 INJECTION INTRAMUSCULAR; INTRAVENOUS EVERY 6 HOURS PRN
Status: DISCONTINUED | OUTPATIENT
Start: 2020-12-15 | End: 2020-12-16 | Stop reason: HOSPADM

## 2020-12-15 RX ORDER — POLYETHYLENE GLYCOL 3350 17 G/17G
17 POWDER, FOR SOLUTION ORAL DAILY PRN
Status: DISCONTINUED | OUTPATIENT
Start: 2020-12-15 | End: 2020-12-16 | Stop reason: HOSPADM

## 2020-12-15 RX ORDER — ONDANSETRON 4 MG/1
4 TABLET, ORALLY DISINTEGRATING ORAL EVERY 6 HOURS PRN
Status: DISCONTINUED | OUTPATIENT
Start: 2020-12-15 | End: 2020-12-16 | Stop reason: HOSPADM

## 2020-12-15 RX ORDER — AMOXICILLIN 250 MG
1 CAPSULE ORAL 2 TIMES DAILY PRN
Status: DISCONTINUED | OUTPATIENT
Start: 2020-12-15 | End: 2020-12-16 | Stop reason: HOSPADM

## 2020-12-15 RX ORDER — SIMVASTATIN 40 MG
40 TABLET ORAL AT BEDTIME
Status: DISCONTINUED | OUTPATIENT
Start: 2020-12-15 | End: 2020-12-16 | Stop reason: HOSPADM

## 2020-12-15 RX ORDER — LISINOPRIL 20 MG/1
20 TABLET ORAL DAILY
Status: DISCONTINUED | OUTPATIENT
Start: 2020-12-16 | End: 2020-12-16 | Stop reason: HOSPADM

## 2020-12-15 RX ORDER — POTASSIUM CHLORIDE 750 MG/1
10 TABLET, EXTENDED RELEASE ORAL ONCE
Status: COMPLETED | OUTPATIENT
Start: 2020-12-15 | End: 2020-12-15

## 2020-12-15 RX ORDER — FERROUS SULFATE 325(65) MG
325 TABLET ORAL DAILY
Status: DISCONTINUED | OUTPATIENT
Start: 2020-12-16 | End: 2020-12-16 | Stop reason: HOSPADM

## 2020-12-15 RX ORDER — BISACODYL 10 MG
10 SUPPOSITORY, RECTAL RECTAL DAILY PRN
Status: DISCONTINUED | OUTPATIENT
Start: 2020-12-15 | End: 2020-12-16 | Stop reason: HOSPADM

## 2020-12-15 RX ORDER — METOPROLOL TARTRATE 25 MG/1
25 TABLET, FILM COATED ORAL 2 TIMES DAILY
Status: DISCONTINUED | OUTPATIENT
Start: 2020-12-15 | End: 2020-12-16 | Stop reason: HOSPADM

## 2020-12-15 RX ORDER — FUROSEMIDE 10 MG/ML
60 INJECTION INTRAMUSCULAR; INTRAVENOUS ONCE
Status: COMPLETED | OUTPATIENT
Start: 2020-12-15 | End: 2020-12-15

## 2020-12-15 RX ORDER — ALBUTEROL SULFATE 90 UG/1
1-2 AEROSOL, METERED RESPIRATORY (INHALATION) EVERY 4 HOURS PRN
Status: DISCONTINUED | OUTPATIENT
Start: 2020-12-15 | End: 2020-12-16 | Stop reason: HOSPADM

## 2020-12-15 RX ORDER — IPRATROPIUM BROMIDE AND ALBUTEROL SULFATE 2.5; .5 MG/3ML; MG/3ML
3 SOLUTION RESPIRATORY (INHALATION)
Status: DISCONTINUED | OUTPATIENT
Start: 2020-12-15 | End: 2020-12-16 | Stop reason: HOSPADM

## 2020-12-15 RX ORDER — NITROGLYCERIN 0.4 MG/1
0.4 TABLET SUBLINGUAL EVERY 5 MIN PRN
Status: DISCONTINUED | OUTPATIENT
Start: 2020-12-15 | End: 2020-12-16 | Stop reason: HOSPADM

## 2020-12-15 RX ORDER — ACETAMINOPHEN 650 MG/1
650 SUPPOSITORY RECTAL EVERY 4 HOURS PRN
Status: DISCONTINUED | OUTPATIENT
Start: 2020-12-15 | End: 2020-12-16 | Stop reason: HOSPADM

## 2020-12-15 RX ADMIN — IPRATROPIUM BROMIDE AND ALBUTEROL SULFATE 3 ML: .5; 3 SOLUTION RESPIRATORY (INHALATION) at 11:56

## 2020-12-15 RX ADMIN — PANTOPRAZOLE SODIUM 40 MG: 40 TABLET, DELAYED RELEASE ORAL at 16:55

## 2020-12-15 RX ADMIN — IPRATROPIUM BROMIDE AND ALBUTEROL SULFATE 3 ML: .5; 3 SOLUTION RESPIRATORY (INHALATION) at 19:37

## 2020-12-15 RX ADMIN — POTASSIUM CHLORIDE 10 MEQ: 750 TABLET, EXTENDED RELEASE ORAL at 23:38

## 2020-12-15 RX ADMIN — DABIGATRAN ETEXILATE MESYLATE 150 MG: 150 CAPSULE ORAL at 21:49

## 2020-12-15 RX ADMIN — SIMVASTATIN 40 MG: 40 TABLET, FILM COATED ORAL at 21:49

## 2020-12-15 RX ADMIN — FUROSEMIDE 60 MG: 10 INJECTION, SOLUTION INTRAVENOUS at 20:12

## 2020-12-15 RX ADMIN — IPRATROPIUM BROMIDE AND ALBUTEROL SULFATE 3 ML: .5; 3 SOLUTION RESPIRATORY (INHALATION) at 14:59

## 2020-12-15 RX ADMIN — ISOSORBIDE MONONITRATE 15 MG: 30 TABLET, EXTENDED RELEASE ORAL at 15:07

## 2020-12-15 RX ADMIN — FUROSEMIDE 60 MG: 10 INJECTION, SOLUTION INTRAVENOUS at 04:11

## 2020-12-15 RX ADMIN — BUDESONIDE 0.5 MG: 0.5 INHALANT RESPIRATORY (INHALATION) at 21:59

## 2020-12-15 RX ADMIN — FUROSEMIDE 60 MG: 10 INJECTION, SOLUTION INTRAVENOUS at 12:28

## 2020-12-15 RX ADMIN — HYDRALAZINE HYDROCHLORIDE 12.5 MG: 25 TABLET ORAL at 21:49

## 2020-12-15 RX ADMIN — HYDRALAZINE HYDROCHLORIDE 12.5 MG: 25 TABLET ORAL at 16:55

## 2020-12-15 RX ADMIN — METOPROLOL TARTRATE 25 MG: 25 TABLET, FILM COATED ORAL at 21:49

## 2020-12-15 RX ADMIN — THERA TABS 1 TABLET: TAB at 20:12

## 2020-12-15 RX ADMIN — HUMAN ALBUMIN MICROSPHERES AND PERFLUTREN 9 ML: 10; .22 INJECTION, SOLUTION INTRAVENOUS at 13:13

## 2020-12-15 ASSESSMENT — ACTIVITIES OF DAILY LIVING (ADL)
ADLS_ACUITY_SCORE: 14
HEARING_DIFFICULTY_OR_DEAF: YES
CONCENTRATING,_REMEMBERING_OR_MAKING_DECISIONS_DIFFICULTY: NO
DIFFICULTY_COMMUNICATING: NO
WALKING_OR_CLIMBING_STAIRS_DIFFICULTY: NO
DRESSING/BATHING_DIFFICULTY: NO
VISION_MANAGEMENT: GLASSES
FALL_HISTORY_WITHIN_LAST_SIX_MONTHS: NO
DIFFICULTY_EATING/SWALLOWING: NO
ADLS_ACUITY_SCORE: 14
PATIENT_/_FAMILY_COMMUNICATION_STYLE: SPOKEN LANGUAGE (ENGLISH OR BILINGUAL)
DESCRIBE_HEARING_LOSS: BILATERAL HEARING LOSS
DOING_ERRANDS_INDEPENDENTLY_DIFFICULTY: NO
WEAR_GLASSES_OR_BLIND: YES
TOILETING_ISSUES: NO
WERE_AUXILIARY_AIDS_OFFERED?: NO
ADLS_ACUITY_SCORE: 14

## 2020-12-15 ASSESSMENT — ENCOUNTER SYMPTOMS
COUGH: 0
SORE THROAT: 0
ABDOMINAL PAIN: 0
FEVER: 0
HEADACHES: 0
SHORTNESS OF BREATH: 1
DIFFICULTY URINATING: 0
FREQUENCY: 0
DYSURIA: 0

## 2020-12-15 ASSESSMENT — MIFFLIN-ST. JEOR
SCORE: 1283.15
SCORE: 1319.47

## 2020-12-15 NOTE — PHARMACY-ADMISSION MEDICATION HISTORY
Pharmacy Medication History  Admission medication history interview status for the 12/15/2020  admission is complete. See EPIC admission navigator for prior to admission medications       Medication history sources: Patient and Surescripts  Location of interview: Patient room - face mask and face shield worn  Adherence Assessment: Good      Additional medication history information:   Interview completed by written communication, including printed med list from [facility].  Pt checked boxes as yes or no, and provided written details of last doses.  Pt states he took meds this morning already.    Medication reconciliation completed by provider prior to medication history? No    Time spent in this activity: 30 minutes      Prior to Admission medications    Medication Sig Last Dose Taking? Auth Provider   albuterol (PROAIR HFA/PROVENTIL HFA/VENTOLIN HFA) 108 (90 Base) MCG/ACT inhaler INHALE 1 TO 2 PUFFS BY MOUTH EVERY 4 TO 6 HOURS AS NEEDED prn Yes Dany Rodriguez MD   aspirin (ASA) 81 MG EC tablet Take 81 mg by mouth daily 12/15/2020 at Unknown time Yes Reported, Patient   budesonide (PULMICORT) 0.5 MG/2ML nebulizer solution Take 2 mLs (0.5 mg) by nebulization 2 times daily 12/15/2020 at Unknown time Yes Dany Rodriguez MD   calcium carbonate 600 mg-vitamin D 400 units (CALTRATE) 600-400 MG-UNIT per tablet Take 1 tablet by mouth daily 12/15/2020 at Unknown time Yes Unknown, Entered By History   dabigatran ANTICOAGULANT (PRADAXA) 150 MG capsule Take 1 capsule (150 mg) by mouth 2 times daily Store in original 's bottle or blister pack; use within 120 days of opening. 12/15/2020 at am Yes Ryan Samuel MD   ferrous sulfate (IRON) 325 (65 Fe) MG tablet TAKE 1 TABLET(325 MG) BY MOUTH TWICE DAILY 12/15/2020 at am Yes Dany Rodriguez MD   ipratropium - albuterol 0.5 mg/2.5 mg/3 mL (DUONEB) 0.5-2.5 (3) MG/3ML nebulization Inhale 1 vial (3 mLs) into the lungs 4 times daily  12/15/2020 at Unknown time Yes Dany Rodriguez MD   lisinopril (ZESTRIL) 20 MG tablet Take 1 tablet (20 mg) by mouth daily 12/15/2020 at Unknown time Yes Dany Rodriguez MD   meclizine (ANTIVERT) 12.5 MG tablet Take 1 tablet (12.5 mg) by mouth 3 times daily as needed for dizziness prn Yes Joon Morris MD   melatonin 3 MG tablet Take 1 tablet (3 mg) by mouth nightly as needed for sleep prn Yes Nnamdi Sears MD   metoprolol tartrate (LOPRESSOR) 25 MG tablet Take 1 tablet (25 mg) by mouth 2 times daily 12/15/2020 at am Yes Dany Rodriguez MD   montelukast (SINGULAIR) 10 MG tablet TAKE 1 TABLET(10 MG) BY MOUTH DAILY 12/15/2020 at Unknown time Yes Dany Rodriguez MD   multivitamin, therapeutic (THERA-VIT) TABS tablet Take 1 tablet by mouth every evening  12/14/2020 at Unknown time Yes Unknown, Entered By History   nitroGLYcerin (NITROSTAT) 0.4 MG sublingual tablet For chest pain place 1 tablet under the tongue every 5 minutes for 3 doses. If symptoms persist 5 minutes after 1st dose call 911. prn Yes Bozena Groves MD   pantoprazole (PROTONIX) 40 MG EC tablet TAKE ONE TABLET BY MOUTH TWICE A DAY BEFORE MEALS 12/15/2020 at am Yes Dany Rodriguez MD   simvastatin (ZOCOR) 40 MG tablet TAKE 1 TABLET(40 MG) BY MOUTH AT BEDTIME 12/14/2020 at Unknown time Yes Dany Rodriguez MD   torsemide (DEMADEX) 5 MG tablet Take 1 tablet (5 mg) by mouth daily 12/15/2020 at Unknown time Yes Amee Rocha PA-C       The information provided in this note is only as accurate as the sources available at the time of the update(s).

## 2020-12-15 NOTE — ED NOTES
Assisted patient out of bed and to stand at bedside.  States that his legs are hurting from being in the bed.

## 2020-12-15 NOTE — PROGRESS NOTES
..RECEIVING UNIT ED HANDOFF REVIEW    ED Nurse Handoff Report was reviewed by: Erica Arciniega RN on December 15, 2020 at 9:57 AM

## 2020-12-15 NOTE — LETTER
Transition Communication Hand-off for Care Transitions to Next Level of Care Provider    Name: Shiraz Ingram  : 1934  MRN #: [id number]  Primary Care Provider: Dany Rodriguez MD     Primary Clinic: [address] [place] [id number]     Reason for Hospitalization:  Acute on chronic congestive heart failure, unspecified heart failure type (H) [I50.9]  Admit Date/Time: 12/15/2020  3:09 AM  Discharge Date: 20  Payor Source: Payor: ARE / Plan: ARE MEDICARE NON [employer] PARTNERS / Product Type: HMO /            Reason for Communication Hand-off Referral: Fragility  Multiple providers/specialties    Discharge Plan: Home with outpt follow-up with [facility]       Concern for non-adherence with plan of care:   Y/N : No  Discharge Needs Assessment:  Needs      Most Recent Value   Equipment Currently Used at Home  grab bar, tub/shower          Already enrolled in Tele-monitoring program and name of program:  [facility]  Follow-up specialty is recommended: Yes    Follow-up plan:    Future Appointments   Date Time Provider Department Center   2020  2:00 AM Adina Mariano, OT [facility]   2020  2:15 PM [facility]   [facility]  3:15 [facility]   2020  2:40 PM Dany Rodriguez MD [facility]   2020  8:15 AM [facility]   2020  8:30 AM [facility]   3[facility]25[facility]2021 12:[facility]       Any outstanding tests or procedures:        Referrals     Future Labs/Procedures    Follow-Up with Cardiologist     CARE COORDINATION REFERRAL     Comments:    Services are provided by a Care Coordinator for people with complex needs such as: medical, social, or financial troubles.  The Care Coordinator works with the patient and their Primary Care Provider to determine health goals, obtain resources, achieve outcomes, and develop care plans that help coordinate the  patient's care.     Reason for Referral: Care Transition: Pt back in for CHF.  Pt uses [place] Sign Language for communication. Following with [facility].    Additional pertinent details:      Clinical Staff have discussed the Care Coordination Referral with the patient and/or caregiver: no            Key Recommendations:  Received orders for discharge planning due to pt having CHF. Pt also has elevated unplanned readmission risk score.  Met with pt with the use of .  Assisted pt with making a PCP appointment. Pt has a scale that he weighs himself every morning after he uses the restroom.  Pt to follow with [facility]    Kay Ramos RN    AVS/Discharge Summary is the source of truth; this is a helpful guide for improved communication of patient story

## 2020-12-15 NOTE — ED NOTES
[facility]  ED Nurse Handoff Report    ED Chief complaint: Shortness of Breath      ED Diagnosis:   Final diagnoses:   None       Code Status: DNR / DNI    Allergies: No Known Allergies    Patient Story: Shortness of breath that gets worse when lying down.  Bilateral lower leg/ankle swelling, right worse than left.  Patient has been taking his diuretics.  Was dx with covid on 11/14/2020.  Patient is deaf but is able to speak.  Language services is not sending sign language interpretors into the hospital any longer due to covid.  Patient responds well to written information.  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  Focused Assessment:  See above    Treatments and/or interventions provided: lasix, labs, xray  Patient's response to treatments and/or interventions: feeling better    To be done/followed up on inpatient unit:  n/a    Does this patient have any cognitive concerns?: none    Activity level - Baseline/Home:  Independent  Activity Level - Current:   Stand with Assist    Patient's Preferred language: American Sign Language   Needed?: Yes    Isolation: None and COVID r/o and special precautions  Infection: Not Applicable  Patient tested for COVID 19 prior to admission: YES  Bariatric?: No    Vital Signs:   Vitals:    12/15/20 0314 12/15/20 0315 12/15/20 0330 12/15/20 0345   BP:   (!) 184/106 (!) 159/69   Pulse:   76 73   Resp:   27 21   SpO2: 95% 95% 95% 91%       Cardiac Rhythm:     Was the PSS-3 completed:   Yes  What interventions are required if any?               Family Comments: n/a  OBS brochure/video discussed/provided to patient/family: N/A              Name of person given brochure if not patient: n/a              Relationship to patient: n/a    For the majority of the shift this patient's behavior was Green.   Behavioral interventions performed were n/a.    ED NURSE PHONE NUMBER:  [phone]

## 2020-12-15 NOTE — ED PROVIDER NOTES
History     Chief Complaint:  Shortness of Breath      HPI   Shiraz Ingram is a 86 year old male with a history of CAD, CHF, atrial fibrillation on Pradaxa, CKD, and asthma who presents with shortness of breath.  The patient reports developing shortness of breath yesterday which worsened this evening.  He has increased shortness of breath when laying flat and also noticed increased leg swelling.  He denies any cough, chest pain, fever, or sick contacts.  He tested positive for COVID 1 month ago.  He was hospitalized 10/27 - 10/30/2020 for acute on chronic heart failure after presenting with similar symptoms.  Patient reports he has been compliant with his medications.  Patient further denies any headache, sore throat, abdominal pain, urinary symptoms or diarrhea.  He notes he has been urinating well on his diuretics.    Allergies:  No known drug allergies.     Medications:    Aspirin  Pradaxa  Lisinopril   Metoprolol   Torsemide   Simvastatin   Nitroglycerin   Meclizine   Pantoprazole   Melatonin   Ferrous sulfate   Singulair  Duoneb solution  Budesonide nebulizer solution  Albuterol inhaler    Past Medical History:    Atrial fibrillation   Coronary artery disease   Congestive heart failure   Severe aortic stenosis   Hypertension  Hyperlipidemia   Complete heart block  Thrombus of left atrial appendage   Chronic kidney disease   GI bleed  Hearing loss   Anemia   Moderate persistent asthma   Vitamin B12 deficiency  Osteoporosis     Past Surgical History:    Pacemaker placement 7/15/2020  Aortic valve replacement 7/14/2020  Coronary angiogram, right heart cath 4/24/2020  Inguinal herniorrhaphy, right 2016  Cholecystectomy 2003  Coronary artery bypass graft 2002    Family History:    Coronary artery disease - father, son   Breast cancer - mother  Cerebrovascular disease - son    Social History:  Tobacco Use: Former smoker, <1 pack year  Alcohol Use: Occasional alcohol use.   PCP: Dany Rodriguez MD     Review  of Systems   Constitutional: Negative for fever.   HENT: Negative for sore throat.    Respiratory: Positive for shortness of breath. Negative for cough.         Positive for orthopnea.   Cardiovascular: Positive for leg swelling. Negative for chest pain.   Gastrointestinal: Negative for abdominal pain.   Genitourinary: Negative for difficulty urinating, dysuria and frequency.   Neurological: Negative for headaches.   All other systems reviewed and are negative.    Physical Exam   First Vitals:  BP: (!) 184/95  Pulse: 74  Resp: 27  SpO2: 95 %    Physical Exam  General: very pleasant,  male. Deaf.    Head: The scalp, face, and head appear normal  Eyes: The pupils are equal, round, and reactive to light. Conjunctivae and sclerae are normal  CV: irregularly, irregular, normal rate   Resp: no respiratory distress, crackles in the bases   GI: Abdomen is soft, no rigidity, guarding, or rebound. No distension. No tenderness to palpation in any quadrant.     MS: Normal tone. Joints grossly normal without effusions. 1+ pitting edema.   Skin: No rash or lesions noted. Normal capillary refill noted  Neuro: Speech is normal and fluent. Face is symmetric. Moving all extremities.   Psych:  Normal affect.  Appropriate interactions    Emergency Department Course   ECG:  @ 0324  Indication: shortness of breath   Vent. Rate 79 bpm. ID interval * ms. QRS duration 136 ms. QT/QTc 446/511 ms. P-R-T axis * -53 49.   Atrial fibrillation with premature ventricular or aberrantly conducted complexes.  Abnormal ECG.  No significant change when compared to previous ECG from 10/27/2020   Read @ 0341 by Dr. Valle.     Imaging:  Chest X-ray, Portable (1 view):  PO sternotomy. Single-lead left-sided cardiac pacer in stable position. Mild cardiac enlargement and increased pulmonary vascularity. Small to moderate left pleural effusion and questionable tiny right pleural effusion. Findings are suggestive of CHF or fluid overload. Clinical  correlation. No acute-appearing infiltrates or consolidation. Aortic calcification. Remainder unremarkable.  Report per radiology.      Radiographic findings were communicated with the patient and Admitting MD who voiced understanding of the findings.    Laboratory:  CBC: WBC 3.8 (L), HGB 9.2 (L), HCT 30.2 (L), MCV 67 (L), MCH 20.5 (L), RDW 18.9 (H),  (L), otherwise WNL   BMP: Glucose 111 (H), Creatinine 1.5 (H), GFR 42 (L), otherwise WNL   Troponin I: 0.036  Pro-BNP: 8916 (H)      Asymptomatic influenza A/B & SARS-CoV2 Virus PCR: pending     Interventions:  (4606) Lasix, 60 mg, IV injection     Emergency Department Course:  Nursing notes and vitals reviewed.  I performed an exam of the patient as documented above.     EKG was done, interpretation as above.    A peripheral IV was established. Blood was drawn from the patient. This was sent for laboratory testing, findings above.       The patient had a portable chest x-ray done in the emergency department.     The patient's nose was swabbed and samples were sent for influenza and COVID testing, findings above.     Findings and plan explained to the patient who consents to admission.   (8208) I discussed the patient with Dr. Fournier of the hospitalist service, who will admit the patient to a medical bed for further monitoring, evaluation, and treatment.     Impression & Plan      Covid-19  Shiraz Ingram was evaluated during a global COVID-19 pandemic, which necessitated consideration that the patient might be at risk for infection with the SARS-CoV-2 virus that causes COVID-19.   Applicable protocols for evaluation were followed during the patient's care.   COVID-19 was considered as part of the patient's evaluation. The plan for testing is:  a test was obtained during this visit.     Medical Decision Making:  Patient is a very nice 86-year-old gentleman with past medical history of coronary artery disease, congestive heart failure, atrial fibrillation on  Pradaxa and chronic kidney disease who presents to the emergency department with worsening orthopnea peripheral edema.  Upon initial evaluation patient is hypertensive but is otherwise hemodynamically stable and oxygenating well on room air.  Patient's work-up here today is consistent with congestive heart failure exacerbation.  Patient was treated with IV Lasix.  Chest x-ray reveals evidence consistent with CHF.  Patient will be admitted for further diuresis and evaluation.    Diagnosis:    ICD-10-CM    1. Acute on chronic congestive heart failure, unspecified heart failure type (H)  I50.9        Disposition:  Admitted       INeeta, guanako serving as a scribe on 12/15/2020 at 3:20 AM to personally document services performed by Danial Valle MD based on my observations and the provider's statements to me.     Neeta Vargas  12/15/2020   [facility] EMERGENCY DEPT       Danial Valle MD  12/15/20 0552

## 2020-12-15 NOTE — PROGRESS NOTES
Pt admitted earlier today by my colleague, Dr. Fournier. This note is in follow up    A&P    Asymptomatic COVID-19 pending, low suspicion (recovered)    Pt needs     Acute on chronic HFpEF, suspected  H/o TAVR 7/2020 complicated by CHB necessitating ppm placement  CAD s/p CABG 2002  He has known CAD, having undergone CABG in 2002 [LIMA -LAD, VG-OM and the RCA]. Most recent cardiac catheterization 4/2020.  In 7/2020, he underwent TAVR for severe aortic stenosis, complicated by heart block requiring permanent pacemaker placement. After that, he was hospitalized for back pain due to lumbar compression fracture, managed conservatively, as well as for community acquired pneumonia; his diuretic was held at that discharge. He then was re-admitted for acute diastolic CHF exacerbation 10/2020, and diuresed successfully. TTE 10/28/2020 showed preserved LVEF. His dry weight is 142.5 pounds reportedly.   * At his most recent Cardiology clinic visit 11/4/2020, his Torsemide was reduced to 5 mg daily from 10 mg.  * Then on 11/14/2020, he was assessed in a PCP visit for cough and found to have COVID-19. It seems he did not have acute respiratory distress from that, nor hypoxia, and did not require hospitalization for it.  * Now he presents for 1-2 days of acute onset dyspnea, orthopnea and leg edema. Initial SBP was near 160 on arrival. BNP is 8916, increased from 3042 on 11/4/2020. CXR shows bilateral pleural effusions and pulmonary vascular congestion. He could have acute diastolic CHF exacerbation due to accelerated hypertension; we will rule out ACS. PE seems less likely in the setting of chronic DOAC use.  - telemetry, check EKG  - daily weights, I/O's  - troponins, initial 0.036  - TTE  - cardiology consulted  - lasix 60 mg IV TID, monitor closely for response  - continue home lisinopril 20 mg daily, metoprolol 25 mg BID and simvastatin 40 mg daily    Chronic atrial fibrillation  Note is made of prior left  atrial thrombus as well. He takes Pradaxa as an outpatient  - continue home pradaxa    Chronic pancytopenia  He is followed by Dr. Morris of Hematology for known lung nodules and renal masses, as well as chronic pancytopenia, which is being monitored serially. MDS has been thought possible, and if it worsens, the plan has been to consider bone marrow biopsy. Today's low cell counts are not markedly different from recent prior values  - monitor per routine during hospital stay    CKD 3  Cr 1.50 on presentation similar to apparent baseline 1.4-1.7.  - monitor closely with diuresis  - avoid nephrotoxins as able     PUD  He was seen 5/2020 by Dr. Fournier of GI when hospitalized for acute upper GI bleeding, and EGD showed a bleeding esophageal ulcer that was cauterized.   - continue pantoprazole 40 mg PO BID      Asthma, moderate persistent  - continue home pulmoicort and scheduled duonebs  - continue singulair  - prn albuterol available    Nnamdi Sears M.D.  Hospitalist  Pager [phone]  Text Page

## 2020-12-15 NOTE — CONSULTS
[facility]    Cardiology Consultation     Date of Admission:  12/15/2020  Date of Consult (When I saw the patient): 12/15/2020    Followed by Dr. Field and Amee Rocha PA-C in [facility]    Assessment & Plan   Shiraz Ingram is a 86 year old male complex medical history including aortic stenosis s/p TAVR 7-2020 complicated with CHB and pacemaker insertion, CAD with CABG in 2002 (LIMA to LAD, SVG TO OM AND Y graft to PDA and NASREEN on right; angiogram in April revealed moderate lesion at touchdown of PDA Y graft to RCA; patent NASREEN branch; patent LIMA), patent SVG to OM), chronic atrial fibrillation with hx of MAYA thrombus,on warfarin, UGIB 5-2020 with cautery, HTN, asthma, CKD,lung nodules, adrenal masses and panctyopenia  followed by Oncology, COVID + recovered and not admitted in November who was admitted on 12/15/2020 with shortness of breath and edema. We are asked to see the patient for acute on chronic HFpEF with valvular heart disease.    RHC pre TAVR    Wedge before TAVR with weight at 166 PA 67/33 mean 44 PCWP 29 RAP17       This plan was discussed with Dr. Villar who saw this patient as well    Labs: Sodium 138 potassium 3.8 BUN 29 creatinine 1.5 N-terminal proBNP 8916 troponins negative at 0.30 hemoglobin 9.2 WBC 3.8 MCV low at 67 iron levels are normal earlier this month  CXR: small left pleural effusion    1. Acute on chronic HFpEF with orthopnea in the setting of a few pounds of weight gain  PHTN/HTN likely contributing to diastolic heart failure  Weight 146--141 (reported CORE dry weight 142)  Torsemide decreased 11-4 from 10mg to 5mg due to BUN 1.79 BUN 44  Now in IV lasix 60mg every 8 hours  Creat now 1.5 and BNP 8916 (3000 range in clinc0  Frequent HF admissions  Already down -2425 and weight down to 141.6  Has had 2 doses of IV lasix and diuresing very well.  At least moderate left pleural effusion    PLAN:  I will give 1 more dose IV Lasix this evening and then assess in the morning.   "  -We may be able to start his po in am but go back to his 10mg torsemide dose instead of 5mg  BMP in am  CXR in am to see if effusion better, if not may need consideration for tap  Wedge before TAVR with weight at 166 PA 67/33 mean 44 PCWP 29 RAP17 but weight then was 166    If repeat admissions for brittle HFpEF after adequately vasodilated, he may benefit from RHC to assess new \"dry\" weight  -consider OP sleep study  -Support hose      2. HTN with moderate to severe LVH  Pulmonary hypertension  -no evidence of ESHA on previous CT scans  -May be contributing to HF  Has had increased creatinine in the past on doses of Lisinopril above 20mg daily  -continue lopressor  -has lower extremity edema at home intermittently when on his feet so would avoid amlodipine plus he is on simvastatin 40mg daily    -add hydralazine and low dose nitrates--titrate as able      3. Valvular Heart disease s/p TAVR in July with 1-2+ AI on last echo in October which appeared new  1-2+ TR and moderate pulmonary HTN with RVSP 50 plus RAP and reduced RV function  -echo ordered but pending  Await echo from today to quantify AI and reassess RVSP    4. Chronic atrial fibrillation  Continue BB and Pradaxa 150mg bid      5. CHB  S/p pacemaker post TAVR    6. Pancytopenia with renal and lung lesions  Followed by Oncology    7. CAD with previous CABG  LIMA to LAD, SVG TO OM AND Y graft to PDA and NASREEN on right; angiogram in April revealed moderate lesion at touchdown of PDA Y graft to RCA; patent NASREEN branch; patent LIMA), patent SVG to OM  trops negative  Continue medical therapy    8. GIB in May, cauterized bleeding esophageal ulcer  Hgb 9.2    9. CKD with baseline creatinine 1.3-1.6   currently 1.5      10. COVID + in November-recovered  Negative now    11. Deafness-interpretor used for visit today    12.  Dyslipidemia  LDL in June was 54 HDL 48 triglycerides 87  -Continue simvastatin 40 mg/day      Thank you for this consultation, we will follow " with you  Called son Nicko at patient request and left message    Graciela Hernandez, MSN, APRN, CNP    Code Status    Full Code    Reason for Consult   Reason for consult: heart failure    Primary Care Physician   Dany Rodriguez MD      History of Present Illness   Shiraz Ingram is a 86 year old male who follows in our core clinic with Amee Rocha PA-C.  His cardiologist is Dr. Jaxon Field.     Shiraz has deafness in this history was taken with an  present.    Shiraz has a  complex medical and cardiac history including HFpEF with aortic stenosis s/p TAVR 7-2020 complicated with CHB and pacemaker insertion, CAD with CABG in 2002 (LIMA to LAD, SVG TO OM AND Y graft to PDA and NASREEN on right; angiogram in April revealed moderate lesion at touchdown of PDA Y graft to RCA; patent NASREEN branch; patent LIMA), patent SVG to OM), chronic atrial fibrillation with hx of MAYA thrombus,on warfarin, UGIB 5-2020 with cautery, HTN, asthma, CKD,lung nodules, adrenal masses and panctyopenia  followed by Oncology, COVID + recovered and not admitted in November,previous GIB and deafness.    He reports starting Sunday he felt more short of breath even though his weight had not significantly changed.  Last night he felt orthopnea and presented to the emergency room with breathlessness.  His N-terminal proBNP was elevated to nearly 8000 with previous levels of 3000.  Chest x-ray showed increased pulmonary vascularity with a small to moderate left pleural effusion and a tiny right pleural effusion. 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 He denies chest pain.    He has been placed on IV lasix and has had a brisk response. He currently states he feel back to normal.       He lives with his son Nicko. He watches his diet for low sodium and has not had indiscretions lately. He still works part time doing furniture upholstering    He denies melena or other bleeding.    BP is very high  here in the 170-180 range; Previous CT scans show no ESHA.    Past Medical History   I have reviewed this patient's medical history and updated it with pertinent information if needed.   Past Medical History:   Diagnosis Date     Allergic rhinitis, cause unspecified      Anemia, unspecified      Aortic stenosis     severe     Benign neoplasm of colon 1999    Ademonatous polyp     CKD (chronic kidney disease) stage 3, GFR 30-59 ml/min 05/12/2011     Coronary atherosclerosis of unspecified type of vessel, native or graft     s/p CABG 2002     Hyperlipidemia LDL goal < 100      Hypertension goal BP (blood pressure) < 140/90      Localized osteoarthrosis not specified whether primary or secondary, lower leg     left knee     Moderate persistent asthma      Pacemaker      Persistent atrial fibrillation (H)      Unspecified hearing loss          Past Surgical History   I have reviewed this patient's surgical history and updated it with pertinent information if needed.  Past Surgical History:   Procedure Laterality Date     CARDIAC SURGERY       COLONOSCOPY N/A 5/26/2020    Procedure: COLONOSCOPY;  Surgeon: Ignacio Fournier MD;  Location:  GI     CV ANGIOGRAM CORONARY GRAFT N/A 4/24/2020    Procedure: Angiogram Coronary Graft;  Surgeon: Addison Willard MD;  Location: [facility] CARDIAC CATH LAB     CV CORONARY ANGIOGRAM N/A 4/24/2020    Procedure: Coronary Angiogram;  Surgeon: Addison Willard MD;  Location:  HEART CARDIAC CATH LAB     CV RIGHT HEART CATH N/A 4/24/2020    Procedure: Right Heart Cath;  Surgeon: Addison Willard MD;  Location: [facility] CARDIAC CATH LAB     CV TRANSCATHETER AORTIC VALVE REPLACEMENT N/A 7/14/2020    Procedure: Transcatheter Aortic Valve Replacement;  Surgeon: Soraida Monet MD;  Location:  HEART CARDIAC CATH LAB     EP PACEMAKER N/A 7/15/2020    Procedure: EP Pacemaker;  Surgeon: Tommie Sauer MD;  Location: [facility] CARDIAC CATH LAB     HC COLONOSCOPY  THRU STOMA W BIOPSY/CAUTERY TUMOR/POLYP/LESION  5/03    Dr. Dow, Q 5 years     HC COLONOSCOPY THRU STOMA W BIOPSY/CAUTERY TUMOR/POLYP/LESION  12/199    Dr. Dow, one adenomatous polyp     HC ESOPHAGOSCOPY, DIAGNOSTIC  5/03    Dr. Dow, lower esophageal ring & mild gastritis     HERNIORRHAPHY INGUINAL Right 9/26/2016    Procedure: HERNIORRHAPHY INGUINAL;  Surgeon: Alexis Alexandra MD;  Location: [facility]     LAPAROSCOPIC CHOLECYSTECTOMY  12/03    for biliary sludge     ZZC NONSPECIFIC PROCEDURE  5/02    Coronary artery bypass         Prior to Admission Medications   Prior to Admission Medications   Prescriptions Last Dose Informant Patient Reported? Taking?   albuterol (PROAIR HFA/PROVENTIL HFA/VENTOLIN HFA) 108 (90 Base) MCG/ACT inhaler prn  No Yes   Sig: INHALE 1 TO 2 PUFFS BY MOUTH EVERY 4 TO 6 HOURS AS NEEDED   aspirin (ASA) 81 MG EC tablet 12/15/2020 at Unknown time Self Yes Yes   Sig: Take 81 mg by mouth daily   budesonide (PULMICORT) 0.5 MG/2ML nebulizer solution 12/15/2020 at Unknown time Self No Yes   Sig: Take 2 mLs (0.5 mg) by nebulization 2 times daily   calcium carbonate 600 mg-vitamin D 400 units (CALTRATE) 600-400 MG-UNIT per tablet 12/15/2020 at Unknown time Self Yes Yes   Sig: Take 1 tablet by mouth daily   dabigatran ANTICOAGULANT (PRADAXA) 150 MG capsule 12/15/2020 at am Self No Yes   Sig: Take 1 capsule (150 mg) by mouth 2 times daily Store in original 's bottle or blister pack; use within 120 days of opening.   ferrous sulfate (IRON) 325 (65 Fe) MG tablet 12/15/2020 at am Self No Yes   Sig: TAKE 1 TABLET(325 MG) BY MOUTH TWICE DAILY   ipratropium - albuterol 0.5 mg/2.5 mg/3 mL (DUONEB) 0.5-2.5 (3) MG/3ML nebulization 12/15/2020 at Unknown time Self No Yes   Sig: Inhale 1 vial (3 mLs) into the lungs 4 times daily   lisinopril (ZESTRIL) 20 MG tablet 12/15/2020 at Unknown time  No Yes   Sig: Take 1 tablet (20 mg) by mouth daily   meclizine (ANTIVERT) 12.5 MG tablet prn  No Yes    Sig: Take 1 tablet (12.5 mg) by mouth 3 times daily as needed for dizziness   melatonin 3 MG tablet prn  No Yes   Sig: Take 1 tablet (3 mg) by mouth nightly as needed for sleep   metoprolol tartrate (LOPRESSOR) 25 MG tablet 12/15/2020 at am  No Yes   Sig: Take 1 tablet (25 mg) by mouth 2 times daily   montelukast (SINGULAIR) 10 MG tablet 12/15/2020 at Unknown time Self No Yes   Sig: TAKE 1 TABLET(10 MG) BY MOUTH DAILY   multivitamin, therapeutic (THERA-VIT) TABS tablet 2020 at Unknown time Self Yes Yes   Sig: Take 1 tablet by mouth every evening    nitroGLYcerin (NITROSTAT) 0.4 MG sublingual tablet prn  No Yes   Sig: For chest pain place 1 tablet under the tongue every 5 minutes for 3 doses. If symptoms persist 5 minutes after 1st dose call 911.   pantoprazole (PROTONIX) 40 MG EC tablet 12/15/2020 at am  No Yes   Sig: TAKE ONE TABLET BY MOUTH TWICE A DAY BEFORE MEALS   simvastatin (ZOCOR) 40 MG tablet 2020 at Unknown time Self No Yes   Sig: TAKE 1 TABLET(40 MG) BY MOUTH AT BEDTIME   torsemide (DEMADEX) 5 MG tablet 12/15/2020 at Unknown time  No Yes   Sig: Take 1 tablet (5 mg) by mouth daily      Facility-Administered Medications: None     Allergies   No Known Allergies    Social History   I have personally reviewed the social history with the patient showing .lives with his son and still works part time in furniture upholtery    Family History   I have reviewed this patient's family history and updated it with pertinent information if needed.   Family History   Problem Relation Age of Onset     C.A.D. Father      Cancer Mother         breast cancer,  of pneumonia     Cerebrovascular Disease Son      CABG Son      Family History Negative Child      Family History Negative Sister      Heart Disease Brother          Review of Systems   The 10 point Review of Systems is negative other than noted in the HPI     Physical Exam   Temp: 98  F (36.7  C) Temp src: Oral BP: (!) 171/110 Pulse: 74   Resp: 20  SpO2: 98 % O2 Device: None (Room air)    Vital Signs with Ranges  Temp:  [98  F (36.7  C)] 98  F (36.7  C)  Pulse:  [52-86] 74  Resp:  [14-27] 20  BP: (150-184)/() 171/110  SpO2:  [90 %-98 %] 98 %  141 lbs 6 oz    Constitutional     alert and oriented, in no acute distress.      Skin     warm and dry     ENT     no pallor or cyanosis     Neck    Supple, JVP 10 with +HJR, V wave, soft carotid bruit     Chest     no tenderness to palpation      Lungs  Dull left base; few scattered crackles    Cardiac  Irregularly -irregular rhythm, S1 normal, S2 normal, No S3 or S4, II/VI sytsolic murmurs, no rubs; prominent P2     Abdomen     abdomen soft, bowel sounds normoactive, no hepatosplenomegaly     Extremities and Back     no clubbing, cyanosis. 1+ edema observed.        Neurological     no gross motor deficits noted, affect appropriate, oriented to time, person and place.     Data   Recent Labs   Lab Test 12/15/20  0324 12/08/20  0957 07/13/20  1420 07/13/20  1420 06/03/20  1529 06/03/20  1529   WBC 3.8* 3.8*   < > 3.6*   < > 3.8*   HGB 9.2* 10.9*   < > 11.5*   < > 9.6*   MCV 67* 68*   < > 72*   < > 83   * 136*   < > 168   < > 168   INR  --   --   --  1.21*  --  1.25*    < > = values in this interval not displayed.      Recent Labs   Lab Test 12/15/20  0324 12/08/20  0957    139   POTASSIUM 3.8 4.8   CHLORIDE 105 107   BUN 29 28   CR 1.50* 1.42*     Recent Labs   Lab 12/15/20  0324   *     Recent Labs   Lab Test 09/28/20 2029 07/14/20  1650   ALT 19 21   AST 16 20     Troponin I ES   Date Value Ref Range Status   12/15/2020 0.030 0.000 - 0.045 ug/L Final     Comment:     The 99th percentile for upper reference range is 0.045 ug/L.  Troponin values   in the range of 0.045 - 0.120 ug/L may be associated with risks of adverse   clinical events.     12/15/2020 0.036 0.000 - 0.045 ug/L Final     Comment:     The 99th percentile for upper reference range is 0.045 ug/L.  Troponin values   in the range of  0.045 - 0.120 ug/L may be associated with risks of adverse   clinical events.     10/27/2020 0.032 0.000 - 0.045 ug/L Final     Comment:     The 99th percentile for upper reference range is 0.045 ug/L.  Troponin values   in the range of 0.045 - 0.120 ug/L may be associated with risks of adverse   clinical events.     07/28/2020 0.041 0.000 - 0.045 ug/L Final     Comment:     The 99th percentile for upper reference range is 0.045 ug/L.  Troponin values   in the range of 0.045 - 0.120 ug/L may be associated with risks of adverse   clinical events.     07/28/2020 0.050 (H) 0.000 - 0.045 ug/L Final     Comment:     The 99th percentile for upper reference range is 0.045 ug/L.  Troponin values   in the range of 0.045 - 0.120 ug/L may be associated with risks of adverse   clinical events.         Recent Labs   Lab Test 10/29/20  0532 10/28/20  1235 10/28/20  0540   MAG 1.8 1.7 1.7       Lab Results   Component Value Date    CHOL 119 06/01/2020     Lab Results   Component Value Date    HDL 48 06/01/2020     Lab Results   Component Value Date    LDL 54 06/01/2020     Lab Results   Component Value Date    TRIG 87 06/01/2020     Lab Results   Component Value Date    CHOLHDLRATIO 3.0 07/07/2015          TSH   Date Value Ref Range Status   02/21/2013 1.66 0.4 - 5.0 mU/L Final       Recent Results (from the past 24 hour(s))   XR Chest Port 1 View    Narrative    EXAM: XR CHEST PORT 1 VW  LOCATION: [facility]  DATE/TIME: 12/15/2020 3:54 AM    INDICATION: Shortness of breath.    COMPARISON: 10/27/2020.      Impression    IMPRESSION: PO sternotomy. Single-lead left-sided cardiac pacer in stable position. Mild cardiac enlargement and increased pulmonary vascularity. Small to moderate left pleural effusion and questionable tiny right pleural effusion. Findings are   suggestive of CHF or fluid overload. Clinical correlation. No acute-appearing infiltrates or consolidation. Aortic calcification. Remainder unremarkable.

## 2020-12-15 NOTE — H&P
"[facility]    History and Physical  Hospitalist       Date of Admission:  12/15/2020  Date of Service (when I saw the patient): 12/15/20    ASSESSMENT  Shiraz Ingram is a markedly pleasant 86 year old gentleman with past medical history that is most significant for chronic deafness, CAD, chronic diastolic CHF, recent TAVR, chronic atrial fibrillation and recent complete heart block status post pacemaker placement, recent COVID-19, and CKD 3, among others; who presents with dyspnea and orthopnea and is found to have suspected acute diastolic CHF exacerbation.    PLAN    Suspected acute diastolic CHF exacerbation: He has known CAD, having undergone CABG in 2002 [LIMA -LAD, VG-OM and the RCA]. Most recent cardiac catheterization 4/2020 reportedly revealed: \"Moderate-severe touchdown lesion of the right PDA branch of the REF-hHTL-fWA Y-graft. Otherwise widely patent LIMA-LAD, SVG-OM, and rPL Y-graft branch.\" In 7/2020, he underwent TAVR for severe aortic stenosis, complicated by heart block requiring permanent pacemaker placement. After that, he was hospitalized for back pain due to lumbar compression fracture, managed conservatively, as well as for community acquired pneumonia; his diuretic was held at that discharge. He then was re-admitted for acute diastolic CHF exacerbation 10/2020, and diuresed successfully. TTE 10/28/2020 showed preserved LVEF. His dry weight is 142.5 pounds reportedly.        At his most recent Cardiology clinic visit 11/4/2020, his Torsemide was reduced to 5 mg daily from 10 mg. Then on 11/14/2020, he was assessed in a PCP visit for cough and found to have COVID-19. It seems he did not have acute respiratory distress from that, nor hypoxia, and did not require hospitalization for it.     Now he presents for 1-2 days of acute onset dyspnea, orthopnea and leg edema. Initial SBP was near 160 on arrival. BNP is 8916, increased from 3042 on 11/4/2020. CXR shows bilateral " pleural effusions and pulmonary vascular congestion. He could have acute diastolic CHF exacerbation due to accelerated hypertension; we will rule out ACS. PE seems less likely in the setting of chronic DOAC use.    -- Inpatient. Telemetry, serial enzymes, TTE ordered. Cardiology consulted. Diurese with 60 mg IV Lasix TID.     -- Resume ASA, as well as Zocor, Metoprolol, and Lisinopril for Dyslipidemia and Hypertension when verified    -- Strict I and O's, daily weights, and low sodium diet ordered.    CAF: Note is made of prior left atrial thrombus as well. He takes Pradaxa as an outpatient; resume when verified    Chronic pancytopenia: He is followed by Dr. Morris of Hematology for known lung nodules and renal masses, as well as chronic pancytopenia, which is being monitored serially. MDS has been thought possible, and if it worsens, the plan has been to consider bone marrow biopsy. Today's low cell counts are not markedly different from recent prior values.     -- Daily CBC while hospitalized; resume home iron supplementation at discharge    CKD 3: Cr 1.50 today, similar to apparent baseline 1.4-1.7.    PUD: He was seen 5/2020 by Dr. Fournier of GI when hospitalized for acute upper GI bleeding, and EGD showed a bleeding esophageal ulcer that was cauterized.     -- Resume BID PPI when verified    Asthma, moderate persistent: By history: No signs of wheezing at present. Resume home inhalers and Singulair when verified    Rule Out COVID-19 infection  This patient was evaluated during a global COVID-19 pandemic, which necessitated consideration that the patient might be at risk for infection with the SARS-CoV-2 virus that causes COVID-19. Applicable protocols for evaluation were followed during the patient's care. LOW suspicion for infection.   -follow-up COVID-19 PCR test result; no current indication for precautions    Chief Complaint   Dyspnea    History is obtained from the patient and the ED physician whom I have  "spoken with    History of Present Illness   Shiraz Ingram is a markedly pleasant 86 year old male who presents with dyspnea; he is deaf and sues sign language and written communication. He says the dyspnea started acutely about one and a half days ago now, after he had been working in his garage for a few hours; it is constant, present at rest, exacerbated by exertion or laying flat. He has also noticed associated swelling in both legs that is increased from prior. He feels congested in his chest. Tonight, the dyspnea became so bad as he was laying flat in the bed that he could not sleep and came in. He affirms medication adherence recently and he denies chest pain, fever, chills, sweats, wheezing, nausea, or any other acute complaints.    In the ED, Blood pressure (!) 150/80, pulse 86, resp. rate 14, SpO2 94 %.    CBC and BMP were notable for WBC 4, HGB 9.2, , BUN 29, Cr 1.50, otherwise were within the normal reference range. BNP was elevated at 8916. Troponin 0.036. EKG showed afib with bi-fascicular block and PVC's.     CXR showed: \"IMPRESSION: PO sternotomy. Single-lead left-sided cardiac pacer in stable position. Mild cardiac enlargement and increased pulmonary vascularity. Small to moderate left pleural effusion and questionable tiny right pleural effusion. Findings are   suggestive of CHF or fluid overload. Clinical correlation. No acute-appearing infiltrates or consolidation. Aortic calcification. Remainder unremarkable.\"    He was given 60 mg IV Lasix in the ED.    PHYSICAL EXAM  Blood pressure (!) 150/80, pulse 86, resp. rate 14, SpO2 94 %.  Constitutional: Alert and oriented to person, place and time; no apparent distress  HEENT: normocephalic moist mucus membranes  Respiratory: lungs have crackles to auscultation bilaterally  Cardiovascular: Irregular S1 S2   GI: abdomen soft non tender non distended bowel sounds positive  Lymph/Hematologic: pale, no cervical lymphadenopathy  Skin: no rash, good " turgor  Musculoskeletal: mild to moderate bilateral LE edema  Neurologic: extra-ocular muscles intact; moves all four extremities  Psychiatric: appropriate affect, insight and judgment     DVT Prophylaxis: Pradaxa  Code Status: Full Code; there are different orders listed recently in the chart for code status; he tells me today when I ask him that he is a Full Code. Consider having a professional  work with a  to help him fill out a POLST form prior to discharge    Disposition: Expected discharge in 2-3 days    Dennis Fournier MD    Past Medical History    I have reviewed this patient's medical history and updated it with pertinent information if needed.   Past Medical History:   Diagnosis Date     Allergic rhinitis, cause unspecified      Anemia, unspecified      Aortic stenosis     severe     Benign neoplasm of colon 1999    Ademonatous polyp     CKD (chronic kidney disease) stage 3, GFR 30-59 ml/min 05/12/2011     Coronary atherosclerosis of unspecified type of vessel, native or graft     s/p CABG 2002     Hyperlipidemia LDL goal < 100      Hypertension goal BP (blood pressure) < 140/90      Localized osteoarthrosis not specified whether primary or secondary, lower leg     left knee     Moderate persistent asthma      Pacemaker      Persistent atrial fibrillation (H)      Unspecified hearing loss        Past Surgical History   I have reviewed this patient's surgical history and updated it with pertinent information if needed.  Past Surgical History:   Procedure Laterality Date     CARDIAC SURGERY       COLONOSCOPY N/A 5/26/2020    Procedure: COLONOSCOPY;  Surgeon: Ignacio Fournier MD;  Location:  GI     CV ANGIOGRAM CORONARY GRAFT N/A 4/24/2020    Procedure: Angiogram Coronary Graft;  Surgeon: Addison Willard MD;  Location:  HEART CARDIAC CATH LAB     CV CORONARY ANGIOGRAM N/A 4/24/2020    Procedure: Coronary Angiogram;  Surgeon: Addison Willard,  MD;  Location:  HEART CARDIAC CATH LAB     CV RIGHT HEART CATH N/A 4/24/2020    Procedure: Right Heart Cath;  Surgeon: Addison Willard MD;  Location:  HEART CARDIAC CATH LAB     CV TRANSCATHETER AORTIC VALVE REPLACEMENT N/A 7/14/2020    Procedure: Transcatheter Aortic Valve Replacement;  Surgeon: Soraida Monet MD;  Location:  HEART CARDIAC CATH LAB     EP PACEMAKER N/A 7/15/2020    Procedure: EP Pacemaker;  Surgeon: Tommie Sauer MD;  Location:  HEART CARDIAC CATH LAB     HC COLONOSCOPY THRU STOMA W BIOPSY/CAUTERY TUMOR/POLYP/LESION  5/03    Dr. Dow, Q 5 years     HC COLONOSCOPY THRU STOMA W BIOPSY/CAUTERY TUMOR/POLYP/LESION  12/199    Dr. Dow, one adenomatous polyp     HC ESOPHAGOSCOPY, DIAGNOSTIC  5/03    Dr. Dow, lower esophageal ring & mild gastritis     HERNIORRHAPHY INGUINAL Right 9/26/2016    Procedure: HERNIORRHAPHY INGUINAL;  Surgeon: Alexis Alexandra MD;  Location: [facility]     LAPAROSCOPIC CHOLECYSTECTOMY  12/03    for biliary sludge     ZZC NONSPECIFIC PROCEDURE  5/02    Coronary artery bypass       Prior to Admission Medications   Prior to Admission Medications   Prescriptions Last Dose Informant Patient Reported? Taking?   albuterol (PROAIR HFA/PROVENTIL HFA/VENTOLIN HFA) 108 (90 Base) MCG/ACT inhaler   No No   Sig: INHALE 1 TO 2 PUFFS BY MOUTH EVERY 4 TO 6 HOURS AS NEEDED   aspirin (ASA) 81 MG EC tablet  Self Yes No   Sig: Take 81 mg by mouth daily   budesonide (PULMICORT) 0.5 MG/2ML nebulizer solution  Self No No   Sig: Take 2 mLs (0.5 mg) by nebulization 2 times daily   calcium carbonate 600 mg-vitamin D 400 units (CALTRATE) 600-400 MG-UNIT per tablet  Self Yes No   Sig: Take 1 tablet by mouth daily   dabigatran ANTICOAGULANT (PRADAXA) 150 MG capsule  Self No No   Sig: Take 1 capsule (150 mg) by mouth 2 times daily Store in original 's bottle or blister pack; use within 120 days of opening.   ferrous sulfate (IRON) 325 (65 Fe) MG tablet  Self No  No   Sig: TAKE 1 TABLET(325 MG) BY MOUTH TWICE DAILY   ipratropium - albuterol 0.5 mg/2.5 mg/3 mL (DUONEB) 0.5-2.5 (3) MG/3ML nebulization  Self No No   Sig: Inhale 1 vial (3 mLs) into the lungs 4 times daily   lisinopril (ZESTRIL) 20 MG tablet   No No   Sig: Take 1 tablet (20 mg) by mouth daily   meclizine (ANTIVERT) 12.5 MG tablet   No No   Sig: Take 1 tablet (12.5 mg) by mouth 3 times daily as needed for dizziness   melatonin 3 MG tablet   No No   Sig: Take 1 tablet (3 mg) by mouth nightly as needed for sleep   metoprolol tartrate (LOPRESSOR) 25 MG tablet   No No   Sig: Take 1 tablet (25 mg) by mouth 2 times daily   montelukast (SINGULAIR) 10 MG tablet  Self No No   Sig: TAKE 1 TABLET(10 MG) BY MOUTH DAILY   multivitamin, therapeutic (THERA-VIT) TABS tablet  Self Yes No   Sig: Take 1 tablet by mouth every evening    nitroGLYcerin (NITROSTAT) 0.4 MG sublingual tablet   No No   Sig: For chest pain place 1 tablet under the tongue every 5 minutes for 3 doses. If symptoms persist 5 minutes after 1st dose call 911.   pantoprazole (PROTONIX) 40 MG EC tablet   No No   Sig: TAKE ONE TABLET BY MOUTH TWICE A DAY BEFORE MEALS   simvastatin (ZOCOR) 40 MG tablet  Self No No   Sig: TAKE 1 TABLET(40 MG) BY MOUTH AT BEDTIME   torsemide (DEMADEX) 5 MG tablet   No No   Sig: Take 1 tablet (5 mg) by mouth daily   Patient not taking: Reported on 11/9/2020      Facility-Administered Medications: None     Allergies   No Known Allergies    Social History   I have reviewed this patient's social history and updated it with pertinent information if needed. Shiraz Ingram  reports that he has quit smoking. His smoking use included cigarettes. He has never used smokeless tobacco. He reports current alcohol use. He reports that he does not use drugs.    Family History   Family history assessed and, except as above, is non-contributory.    Family History   Problem Relation Age of Onset     C.A.D. Father      Cancer Mother         breast cancer,   of pneumonia     Cerebrovascular Disease Son      CABG Son      Family History Negative Child      Family History Negative Sister      Heart Disease Brother        Review of Systems   The 10 point Review of Systems is negative other than noted in the HPI or here.     Primary Care Physician   Dany Rodriguez MD    Data   Labs Ordered and Resulted from Time of ED Arrival Up to the Time of Departure from the ED   CBC WITH PLATELETS DIFFERENTIAL - Abnormal; Notable for the following components:       Result Value    WBC 3.8 (*)     Hemoglobin 9.2 (*)     Hematocrit 30.2 (*)     MCV 67 (*)     MCH 20.5 (*)     MCHC 30.5 (*)     RDW 18.9 (*)     Platelet Count 121 (*)     Absolute Lymphocytes 0.6 (*)     All other components within normal limits   BASIC METABOLIC PANEL - Abnormal; Notable for the following components:    Glucose 111 (*)     Creatinine 1.50 (*)     GFR Estimate 42 (*)     GFR Estimate If Black 48 (*)     All other components within normal limits   NT PROBNP INPATIENT - Abnormal; Notable for the following components:    N-Terminal Pro BNP Inpatient 8,916 (*)     All other components within normal limits   TROPONIN I   INFLUENZA A/B & SARS-COV2 PCR MULTIPLEX       Data reviewed today:  I personally reviewed the EKG tracing showing afib with bi-fascicular block and PVC's.    Recent Results (from the past 24 hour(s))   XR Chest Port 1 View    Narrative    EXAM: XR CHEST PORT 1 VW  LOCATION: [facility]  DATE/TIME: 12/15/2020 3:54 AM    INDICATION: Shortness of breath.    COMPARISON: 10/27/2020.      Impression    IMPRESSION: PO sternotomy. Single-lead left-sided cardiac pacer in stable position. Mild cardiac enlargement and increased pulmonary vascularity. Small to moderate left pleural effusion and questionable tiny right pleural effusion. Findings are   suggestive of CHF or fluid overload. Clinical correlation. No acute-appearing infiltrates or consolidation. Aortic calcification.  Remainder unremarkable.

## 2020-12-16 ENCOUNTER — APPOINTMENT (OUTPATIENT)
Dept: OCCUPATIONAL THERAPY | Facility: CLINIC | Age: 85
DRG: 291 | End: 2020-12-16
Attending: HOSPITALIST
Payer: COMMERCIAL

## 2020-12-16 VITALS
DIASTOLIC BLOOD PRESSURE: 54 MMHG | BODY MASS INDEX: 21.9 KG/M2 | WEIGHT: 139.5 LBS | SYSTOLIC BLOOD PRESSURE: 100 MMHG | RESPIRATION RATE: 16 BRPM | OXYGEN SATURATION: 93 % | HEIGHT: 67 IN | TEMPERATURE: 97.8 F | HEART RATE: 89 BPM

## 2020-12-16 LAB
ANION GAP SERPL CALCULATED.3IONS-SCNC: 4 MMOL/L (ref 3–14)
BASOPHILS # BLD AUTO: 0 10E9/L (ref 0–0.2)
BASOPHILS NFR BLD AUTO: 0.8 %
BUN SERPL-MCNC: 36 MG/DL (ref 7–30)
CALCIUM SERPL-MCNC: 9 MG/DL (ref 8.5–10.1)
CHLORIDE SERPL-SCNC: 100 MMOL/L (ref 94–109)
CO2 SERPL-SCNC: 32 MMOL/L (ref 20–32)
CREAT SERPL-MCNC: 1.61 MG/DL (ref 0.66–1.25)
DIFFERENTIAL METHOD BLD: ABNORMAL
EOSINOPHIL # BLD AUTO: 0.4 10E9/L (ref 0–0.7)
EOSINOPHIL NFR BLD AUTO: 10.1 %
ERYTHROCYTE [DISTWIDTH] IN BLOOD BY AUTOMATED COUNT: 18.6 % (ref 10–15)
GFR SERPL CREATININE-BSD FRML MDRD: 38 ML/MIN/{1.73_M2}
GLUCOSE SERPL-MCNC: 91 MG/DL (ref 70–99)
HCT VFR BLD AUTO: 28.2 % (ref 40–53)
HGB BLD-MCNC: 8.8 G/DL (ref 13.3–17.7)
IMM GRANULOCYTES # BLD: 0 10E9/L (ref 0–0.4)
IMM GRANULOCYTES NFR BLD: 0.3 %
LYMPHOCYTES # BLD AUTO: 0.6 10E9/L (ref 0.8–5.3)
LYMPHOCYTES NFR BLD AUTO: 15.8 %
MAGNESIUM SERPL-MCNC: 1.8 MG/DL (ref 1.6–2.3)
MCH RBC QN AUTO: 20.5 PG (ref 26.5–33)
MCHC RBC AUTO-ENTMCNC: 31.2 G/DL (ref 31.5–36.5)
MCV RBC AUTO: 66 FL (ref 78–100)
MONOCYTES # BLD AUTO: 0.4 10E9/L (ref 0–1.3)
MONOCYTES NFR BLD AUTO: 10.3 %
NEUTROPHILS # BLD AUTO: 2.5 10E9/L (ref 1.6–8.3)
NEUTROPHILS NFR BLD AUTO: 62.7 %
NRBC # BLD AUTO: 0 10*3/UL
NRBC BLD AUTO-RTO: 0 /100
PLATELET # BLD AUTO: 126 10E9/L (ref 150–450)
POTASSIUM SERPL-SCNC: 3.8 MMOL/L (ref 3.4–5.3)
RBC # BLD AUTO: 4.3 10E12/L (ref 4.4–5.9)
SODIUM SERPL-SCNC: 136 MMOL/L (ref 133–144)
WBC # BLD AUTO: 4 10E9/L (ref 4–11)

## 2020-12-16 PROCEDURE — 250N000013 HC RX MED GY IP 250 OP 250 PS 637: Performed by: INTERNAL MEDICINE

## 2020-12-16 PROCEDURE — 97110 THERAPEUTIC EXERCISES: CPT | Mod: GO | Performed by: OCCUPATIONAL THERAPIST

## 2020-12-16 PROCEDURE — 97535 SELF CARE MNGMENT TRAINING: CPT | Mod: GO | Performed by: OCCUPATIONAL THERAPIST

## 2020-12-16 PROCEDURE — 250N000013 HC RX MED GY IP 250 OP 250 PS 637: Performed by: NURSE PRACTITIONER

## 2020-12-16 PROCEDURE — 94640 AIRWAY INHALATION TREATMENT: CPT | Mod: 76

## 2020-12-16 PROCEDURE — 250N000009 HC RX 250: Performed by: INTERNAL MEDICINE

## 2020-12-16 PROCEDURE — 94640 AIRWAY INHALATION TREATMENT: CPT

## 2020-12-16 PROCEDURE — 85025 COMPLETE CBC W/AUTO DIFF WBC: CPT | Performed by: HOSPITALIST

## 2020-12-16 PROCEDURE — 999N000157 HC STATISTIC RCP TIME EA 10 MIN

## 2020-12-16 PROCEDURE — 83735 ASSAY OF MAGNESIUM: CPT | Performed by: HOSPITALIST

## 2020-12-16 PROCEDURE — 80048 BASIC METABOLIC PNL TOTAL CA: CPT | Performed by: HOSPITALIST

## 2020-12-16 PROCEDURE — 97165 OT EVAL LOW COMPLEX 30 MIN: CPT | Mod: GO | Performed by: OCCUPATIONAL THERAPIST

## 2020-12-16 PROCEDURE — 99239 HOSP IP/OBS DSCHRG MGMT >30: CPT | Performed by: INTERNAL MEDICINE

## 2020-12-16 PROCEDURE — 99232 SBSQ HOSP IP/OBS MODERATE 35: CPT | Performed by: INTERNAL MEDICINE

## 2020-12-16 PROCEDURE — 36415 COLL VENOUS BLD VENIPUNCTURE: CPT | Performed by: HOSPITALIST

## 2020-12-16 RX ORDER — TORSEMIDE 5 MG/1
5 TABLET ORAL DAILY PRN
COMMUNITY
Start: 2020-12-16 | End: 2021-02-12

## 2020-12-16 RX ORDER — TORSEMIDE 10 MG/1
10 TABLET ORAL DAILY
Qty: 90 TABLET | Refills: 3 | Status: SHIPPED | OUTPATIENT
Start: 2020-12-16 | End: 2020-12-30

## 2020-12-16 RX ORDER — POTASSIUM CHLORIDE 750 MG/1
10 TABLET, EXTENDED RELEASE ORAL ONCE
Status: COMPLETED | OUTPATIENT
Start: 2020-12-16 | End: 2020-12-16

## 2020-12-16 RX ORDER — TORSEMIDE 5 MG/1
5 TABLET ORAL DAILY PRN
Status: DISCONTINUED | OUTPATIENT
Start: 2020-12-16 | End: 2020-12-16 | Stop reason: HOSPADM

## 2020-12-16 RX ORDER — TORSEMIDE 10 MG/1
10 TABLET ORAL DAILY
Status: DISCONTINUED | OUTPATIENT
Start: 2020-12-16 | End: 2020-12-16 | Stop reason: HOSPADM

## 2020-12-16 RX ORDER — ISOSORBIDE MONONITRATE 30 MG/1
15 TABLET, EXTENDED RELEASE ORAL DAILY
Qty: 45 TABLET | Refills: 3 | Status: SHIPPED | OUTPATIENT
Start: 2020-12-16 | End: 2021-01-19

## 2020-12-16 RX ORDER — HYDRALAZINE HYDROCHLORIDE 25 MG/1
12.5 TABLET, FILM COATED ORAL 3 TIMES DAILY
Qty: 90 TABLET | Refills: 3 | Status: SHIPPED | OUTPATIENT
Start: 2020-12-16 | End: 2021-08-23

## 2020-12-16 RX ADMIN — LISINOPRIL 20 MG: 20 TABLET ORAL at 07:51

## 2020-12-16 RX ADMIN — MONTELUKAST 10 MG: 10 TABLET, FILM COATED ORAL at 07:51

## 2020-12-16 RX ADMIN — METOPROLOL TARTRATE 25 MG: 25 TABLET, FILM COATED ORAL at 07:52

## 2020-12-16 RX ADMIN — PANTOPRAZOLE SODIUM 40 MG: 40 TABLET, DELAYED RELEASE ORAL at 07:52

## 2020-12-16 RX ADMIN — ISOSORBIDE MONONITRATE 15 MG: 30 TABLET, EXTENDED RELEASE ORAL at 07:51

## 2020-12-16 RX ADMIN — DABIGATRAN ETEXILATE MESYLATE 150 MG: 150 CAPSULE ORAL at 07:51

## 2020-12-16 RX ADMIN — ASPIRIN 81 MG: 81 TABLET, DELAYED RELEASE ORAL at 07:51

## 2020-12-16 RX ADMIN — HYDRALAZINE HYDROCHLORIDE 12.5 MG: 25 TABLET ORAL at 07:51

## 2020-12-16 RX ADMIN — IPRATROPIUM BROMIDE AND ALBUTEROL SULFATE 3 ML: .5; 3 SOLUTION RESPIRATORY (INHALATION) at 08:01

## 2020-12-16 RX ADMIN — CALCIUM CARBONATE 600 MG (1,500 MG)-VITAMIN D3 400 UNIT TABLET 1 TABLET: at 07:52

## 2020-12-16 RX ADMIN — TORSEMIDE 10 MG: 10 TABLET ORAL at 09:37

## 2020-12-16 RX ADMIN — HYDRALAZINE HYDROCHLORIDE 12.5 MG: 25 TABLET ORAL at 15:05

## 2020-12-16 RX ADMIN — MAGNESIUM 64 MG (MAGNESIUM CHLORIDE) TABLET,DELAYED RELEASE 535 MG: at 11:28

## 2020-12-16 RX ADMIN — BUDESONIDE 0.5 MG: 0.5 INHALANT RESPIRATORY (INHALATION) at 08:01

## 2020-12-16 RX ADMIN — IPRATROPIUM BROMIDE AND ALBUTEROL SULFATE 3 ML: .5; 3 SOLUTION RESPIRATORY (INHALATION) at 11:30

## 2020-12-16 RX ADMIN — FERROUS SULFATE TAB 325 MG (65 MG ELEMENTAL FE) 325 MG: 325 (65 FE) TAB at 07:52

## 2020-12-16 RX ADMIN — POTASSIUM CHLORIDE 10 MEQ: 750 TABLET, EXTENDED RELEASE ORAL at 09:37

## 2020-12-16 ASSESSMENT — ACTIVITIES OF DAILY LIVING (ADL)
PREVIOUS_RESPONSIBILITIES: MEAL PREP;HOUSEKEEPING;LAUNDRY;MEDICATION MANAGEMENT;FINANCES;DRIVING
ADLS_ACUITY_SCORE: 14
ADLS_ACUITY_SCORE: 14
DEPENDENT_IADLS:: INDEPENDENT
ADLS_ACUITY_SCORE: 14
ADLS_ACUITY_SCORE: 14

## 2020-12-16 ASSESSMENT — MIFFLIN-ST. JEOR: SCORE: 1274.65

## 2020-12-16 NOTE — CONSULTS
[facility]    Received [facility] consults from Dr. Fournier and Graciela Yung CNP.    Mr. Ingram has previously enrolled into [facility] with Amee HAYDEN and his cardiologist is Dr. Field.    Unclear discharge timing. Due to his readmission, would prefer expedited follow-up post discharge, certainly before [place] holiday.    Amee Rocha has no availability in next week. Thus I arranged for follow-up visit and labs next week with Dr. Field, followed by a visit and labs with Amee the following week. He likely will need serial follow-up to prevent heart failure exacerbations.     Orders for follow-up and labs placed.    Please call with questions or if changes are needed to this discharge plan.    Future Appointments   Date Time Provider Department Center   12/21/2020  2:15 PM [facility]   12[facility]21[facility]2020  3:[facility]   12/30/2020  8:15 AM [facility]   12/30/2020  8:30 AM Amee Rocha PA-C [facility]         Elly Osman RN BSN  [facility] Care Coordinator [place]  [phone]

## 2020-12-16 NOTE — PLAN OF CARE
Neuro: A&Ox4. Pt is deaf, needs . Overnight writing on paper was used to communicate best  Cardiac: Tele is A-Fib with occasional paced beats  Respiratory: Lungs equal bilaterally with fine crackles. Pt on RA  Activity: Independent  GI/: Bowel sounds audible and active. Urinating in bedside urinal  Musculoskeletal: Strong in all extremities  Skin: CDI. Bruising, dry  Pain: Denied pain. Stated he has cramps in legs that get better with walking around. Aqua K mat ordered and placed to help with this  Drips: None  Drains/Tubes: PIV in R arm  Other/Plan: Plan to get chest x-ray this AM

## 2020-12-16 NOTE — PROGRESS NOTES
"Hospitalist Cross Cover  12/15/2020    Notified of patient with leg cramps. Chart reviewed. Has been getting diuresis with IV lasix. Potassium not checked since admission. No documented h/o restless leg syndrome.     /82 (BP Location: Left arm)   Pulse 74   Temp 98.4  F (36.9  C) (Oral)   Resp 18   Ht 1.707 m (5' 7.21\")   Wt 64.1 kg (141 lb 6 oz)   SpO2 95%   BMI 22.01 kg/m      Plan: Check potassium and replace per protocol if low. Trial aqua K pad for warmth/comfort.     Rose Giron MD        "

## 2020-12-16 NOTE — PROGRESS NOTES
12/16/20 0943   Quick Adds   Type of Visit Initial Occupational Therapy Evaluation   Living Environment   People in home child(perla), adult   Current Living Arrangements house   Home Accessibility stairs to enter home;stairs within home   Number of Stairs, Main Entrance 3   Number of Stairs, Within Home, Primary 8   Transportation Anticipated family or friend will provide;car, drives self   Self-Care   Regular Exercise Yes   Activity/Exercise Type walking   Equipment Currently Used at Home grab bar, tub/shower   Activity/Exercise/Self-Care Comment higher toilet   Disability/Function   Hearing Difficulty or Deaf yes   Patient's preferred means of communication    Describe hearing loss bilateral hearing loss   General Information   Onset of Illness/Injury or Date of Surgery 12/15/20   Referring Physician Dennis Fournier MD   Patient/Family Therapy Goal Statement (OT) home   Additional Occupational Profile Info/Pertinent History of Current Problem Shiraz Ingram is a 86 year old male complex medical history including aortic stenosis s/p TAVR 7-2020 complicated with CHB and pacemaker insertion, CAD with CABG in 2002 (LIMA to LAD, SVG TO OM AND Y graft to PDA and NASREEN on right; angiogram in April revealed moderate lesion at touchdown of PDA Y graft to RCA; patent NASREEN branch; patent LIMA), patent SVG to OM), chronic atrial fibrillation with hx of MAYA thrombus,on warfarin, UGIB 5-2020 with cautery, HTN, asthma, CKD,lung nodules, adrenal masses and panctyopenia  followed by Oncology, COVID + recovered and not admitted in November who was admitted on 12/15/2020 with shortness of breath and edema. We are asked to see the patient for acute on chronic HFpEF with valvular heart disease. Acute on chronic HFpEF with orthopnea in the setting of a few pounds of weight gain   Existing Precautions/Restrictions no known precautions/restrictions   Cognitive Status Examination   Orientation Status  orientation to person, place and time   Affect/Mental Status (Cognitive) WFL   Follows Commands WFL   Sensory   Sensory Quick Adds No deficits were identified   Pain Assessment   Patient Currently in Pain No   Range of Motion Comprehensive   Comment, General Range of Motion B UE AROM WFL   Bed Mobility   Supine-Sit [place] (Bed Mobility) independent   Sit-Supine [place] (Bed Mobility) independent   Transfer Skill: Bed to Chair/Chair to Bed   Bed-Chair [place] (Transfers) supervision   Sit-Stand Transfer   Sit-Stand [place] (Transfers) independent   Lower Body Dressing Assessment/Training   Sherman Level (Lower Body Dressing) independent   Instrumental Activities of Daily Living (IADL)   Previous Responsibilities meal prep;housekeeping;laundry;medication management;finances;driving   Clinical Impression   Criteria for Skilled Therapeutic Interventions Met (OT) yes   OT Diagnosis impaired safety with strenuous IADL's   OT Problem List-Impairments impacting ADL problems related to;activity tolerance impaired   Assessment of Occupational Performance 1-3 Performance Deficits   Identified Performance Deficits impaired safety with yard work, cleaning, laundry, etc   Planned Therapy Interventions (OT) home program guidelines;progressive activity/exercise   Clinical Decision Making Complexity (OT) low complexity   Therapy Frequency (OT) 5x/week   Predicted Duration of Therapy 3 days   Risks and Benefits of Treatment have been explained. Yes   Patient, Family & other staff in agreement with plan of care Yes   OT Discharge Planning    OT Discharge Recommendation (DC Rec) Home with assist   OT Rationale for DC Rec Pt doing well, recommend home with family A with strenuous IADL's ie cleaning, laundry, yard work, etc and cont'd walking program.    OT Brief overview of current status  pt ambulated 6.5 including 10 stairs some fatigue but denies SOB, etc and vitals stable. see vs flow sheet

## 2020-12-16 NOTE — DISCHARGE SUMMARY
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 Field and CORE later in December.  Follow up with Dany Rodriguez regarding this recent admission.               Unresulted Labs Ordered in the Past 30 Days of this Admission     No orders found for last 31 day(s).          Discharge Disposition   Discharged to home with family assist.   Condition at discharge: Good      Hospital Course   Shiraz Ingram is a pleasant 86 year old gentleman with h/o chronic deafness, CAD, HFpEF, TAVR in 7/2020, chronic atrial fibrillation, CHB, s/p PPMt, recent COVID-19, and CKD 3, among others; who presents with dyspnea and orthopnea and is found to have suspected acute diastolic CHF exacerbation.     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 Most recent cardiac catheterization 4/2020.  In 7/2020, he underwent TAVR for severe aortic stenosis, complicated by heart block requiring permanent pacemaker placement. After that, he was hospitalized for back pain due to lumbar compression fracture, managed conservatively, as well as for community acquired pneumonia; his diuretic was held at that discharge. He then was re-admitted for acute diastolic CHF exacerbation  10/2020, and diuresed successfully. TTE 10/28/2020 showed preserved LVEF. His dry weight is 142.5 pounds reportedly.   * RHC pre TAVR Wedge before TAVR with weight at 166 PA 67/33 mean 44 PCWP 29 RAP17  * At his most recent Cardiology clinic visit 11/4/2020, his Torsemide was reduced to 5 mg daily from 10 mg.  * Then on 11/14/2020, he was assessed in a PCP visit for cough and found to have COVID-19. It seems he did not have acute respiratory distress from that, nor hypoxia, and did not require hospitalization for it.  * Now he presents for 1-2 days of acute onset dyspnea, orthopnea and leg edema. Initial SBP was near 160 on arrival. BNP is 8916, increased from 3042 on 11/4/2020. CXR showed bilateral pleural effusions and pulmonary vascular congestion. He could have acute diastolic CHF exacerbation due to accelerated hypertension; we will rule out ACS. PE seems less likely in the setting of chronic DOAC use.     - Cardiology assisted in the care of this patient. He was diuresed with IV lasix 60 mg every 8 hours. Wt from 146 pounds to 139 pounds during this stay (dry weight 142)   - Cardiology recommended discharge on 12/16/20 on torsemide 10 mg daily with additional 5 mg for weight gain (see meds)   - For leg cramps they recommended slo mag BID x 2 weeks (mg 1.8)   - Continue on PTA lisinopril 20 mg daily, metoprolol 25 mg BID and simvastatin 40 mg daily  - Continues on PTA ASA (Defer to cardiology when this and if this can be discontinued in the context of DOAC).     HTN with moderate to severe LVH  Pulmonary hypertension - no evidence of ESHA on previous CT scans  Has had increased creatinine in the past on doses of Lisinopril above 20mg daily. Has intermittent LE edema so avoiding amlodipine.    - continue metoprolol and lisiniopril as PTA  - started on hydralazine and low dose nitrates to be titrated as outpatient      Recovered COVID - positive test 11/14, negative on this admission. He should NOT be tested  within 3 months of the positive test, as he is not likely to be infectious irregardless of the test result.      Chronic atrial fibrillation  Note is made of prior left atrial thrombus as well. He takes Pradaxa as an outpatient  - continue metoprolol and home pradaxa     Chronic pancytopenia  He is followed by Dr. Morris of Hematology for known lung nodules and renal masses, as well as chronic pancytopenia, which is being monitored serially. MDS has been thought possible, and if it worsens, the plan has been to consider bone marrow biopsy. Today's low cell counts are not markedly different from recent prior values  - monitor per routine during hospital stay     CKD 3  Cr 1.50 on presentation similar to apparent baseline 1.4-1.7.    Recent Labs   Lab 12/16/20  0558 12/15/20  0324   CR 1.61* 1.50*          PUD  He was seen 5/2020 by Dr. Fournier of GI when hospitalized for acute upper GI bleeding, and EGD showed a bleeding esophageal ulcer that was cauterized.   - continue pantoprazole 40 mg PO BID      Asthma, moderate persistent  - continue PTA pulmoicort and scheduled duonebs  - continue PTA singulair  - prn albuterol available       Consultations This Hospital Stay   CORE CLINIC EVALUATION IP CONSULT  OCCUPATIONAL THERAPY ADULT IP CONSULT  NUTRITION SERVICES ADULT IP CONSULT  CARE MANAGEMENT / SOCIAL WORK IP CONSULT  CARDIOLOGY IP CONSULT  CORE CLINIC EVALUATION IP CONSULT    Code Status   Full Code    Time Spent on this Encounter   I, Lety Steward MD, personally saw the patient today and spent greater than 30 minutes discharging this patient.       Lety Steward MD  [facility]  [address] [place] [address] [address]  [facility] [address]  Phone: [phone]  ______________________________________________________________________    Physical Exam   Vital Signs: Temp: 97.8  F (36.6  C) Temp src: Oral BP: 103/70 Pulse: 66   Resp: 16 SpO2: 93 % O2 Device: None (Room air)    Weight: 139  lbs 8 oz  Constitutional: NAD,   Neuropsyche:  alert and oriented, answers questions appropriately. Speech normal, face symmetric and moving all 4 extremities without gross focal neurological deficit.   Respiratory:  Breathing comfortably, good air exchange, crackles localized to the bases, otherwise clear, no wheezes.   Cardiovascular:  Irregular rate and rhythm with mild systolic murmur, trace edema.  GI:  soft, NT/ND, BS normal  Skin/Integumen:  No acute rash or sign of bleeding.          Primary Care Physician   Dany Rodriguez MD    Discharge Orders      Basic metabolic panel     N terminal pro BNP outpatient     N terminal pro BNP outpatient     Follow-Up with Cardiologist      Reason for your hospital stay    You were admitted for treatment of fluid overload from heart disease.     Follow-up and recommended labs and tests     Follow up scheduled next week with Dr. Field and CORE later in December.  Follow up with Dany Rodriguez regarding this recent admission.     Activity    Your activity upon discharge: activity as tolerated     Diet    Diet 2 gm NA Diet; Low Saturated Fat Diet.       Significant Results and Procedures   Most Recent 3 CBC's:  Recent Labs   Lab Test 12/16/20  0558 12/15/20  0324 12/08/20  0957   WBC 4.0 3.8* 3.8*   HGB 8.8* 9.2* 10.9*   MCV 66* 67* 68*   * 121* 136*     Most Recent 3 BMP's:  Recent Labs   Lab Test 12/16/20  0558 12/15/20  2249 12/15/20  0324 12/08/20  0957     --  138 139   POTASSIUM 3.8 3.8 3.8 4.8   CHLORIDE 100  --  105 107   CO2 32  --  30 31   BUN 36*  --  29 28   CR 1.61*  --  1.50* 1.42*   ANIONGAP 4  --  3 1*   AYALA 9.0  --  8.9 9.3   GLC 91  --  111* 87     Most Recent 3 BNP's:  Recent Labs   Lab Test 12/15/20  0324 11/04/20  1325 10/27/20  1812 07/27/20  1906   NTBNPI 8,916*  --  9,974* 4,780*   NTBNP  --  3,042*  --   --    ,   Results for orders placed or performed during the hospital encounter of 12/15/20   XR Chest Port 1 View     Narrative    EXAM: XR CHEST PORT 1 VW  LOCATION: [facility]  DATE/TIME: 12/15/2020 3:54 AM    INDICATION: Shortness of breath.    COMPARISON: 10/27/2020.      Impression    IMPRESSION: PO sternotomy. Single-lead left-sided cardiac pacer in stable position. Mild cardiac enlargement and increased pulmonary vascularity. Small to moderate left pleural effusion and questionable tiny right pleural effusion. Findings are   suggestive of CHF or fluid overload. Clinical correlation. No acute-appearing infiltrates or consolidation. Aortic calcification. Remainder unremarkable.   Echocardiogram Complete    Narrative    [id number]  [id number]  [id number]  [id number]^RAGHAV^JESSI^HEMANTH           [facility]  Echocardiography Laboratory  [address] [place] [address]        Name: SRIKANTH OBANDO  MRN: [id number]  : 1934  Study Date: 12/15/2020 12:29 PM  Age: 86 yrs  Gender: Male  Patient Location: [facility]  Reason For Study: Heart Failure  History: TAVR, PACEMAKER  Ordering Physician: JESSI AVILEZ  Referring Physician: NATHAN ROME  Performed By: Radames Rahman RDCS     BSA: 1.7 m2  Height: 67 in  Weight: 141 lb  HR: 48  BP: 171/110 mmHg  _____________________________________________________________________________  __        Procedure  Complete Portable Echo Adult. Optison (NDC #2391-4381) given intravenously.  _____________________________________________________________________________  __        Interpretation Summary     1. The left ventricle is normal in size. The visual ejection fraction is  estimated at 50%. Basal to mid inferior and inferolateral hypokinesis.  2. The right ventricle is normal size. Mildly decreased right ventricular  systolic function  3. There is moderate (2+) tricuspid regurgitation.  4. Moderate (46-55mmHg) pulmonary hypertension is present. The right  ventricular systolic pressure is approximated at 49mmHg plus the right atrial  pressure.  5. s/p  TAVR, 29mm Medtronic Evolut Pro, implanted 7/2020. Mean 7mmHg, Vmax  1.9m/s, DI 0.51. Two small AI jets seen in apical views. Not seen in short  axis views. Suspect this is mild PVL.     Echo from 10-28-20 showed EF 55%, 1-2+ AI, TAVR with mean 7mmHg, Vmax 2.1m/s.  _____________________________________________________________________________  __        Left Ventricle  The left ventricle is normal in size. There is mild eccentric left ventricular  hypertrophy. The visual ejection fraction is estimated at 50%. Diastolic  function not assessed due to arrhythmia. Septal wall motion abnormality may  reflect pacemaker activation. Basal to mid inferior and inferolateral  hypokinesis.     Right Ventricle  The right ventricle is normal size. Mildly decreased right ventricular  systolic function. There is a pacemaker lead in the right ventricle.     Atria  There is severe biatrial enlargement. There is no atrial shunt seen.     Mitral Valve  There is trace mitral regurgitation.        Tricuspid Valve  There is moderate (2+) tricuspid regurgitation. Moderate (46-55mmHg) pulmonary  hypertension is present. The right ventricular systolic pressure is  approximated at 49mmHg plus the right atrial pressure.     Aortic Valve  There is a bioprosthetic aortic valve. s/p TAVR, 29mm Medtronic Evolut Pro,  implanted 7/2020. Mean 7mmHg, Vmax 1.9m/s, DI 0.51. Two small AI jets seen in  apical views. Not seen in short axis views. Suspect this is mild PVL.     Pulmonic Valve  The pulmonic valve is normal in structure and function.     Vessels  Normal ascending, transverse (arch), and descending aorta. The inferior vena  cava was normal in size with preserved respiratory variability.     Pericardium  There is no pericardial effusion.        Rhythm  The rhythm was paced.  _____________________________________________________________________________  __  MMode/2D Measurements & Calculations  IVSd: 1.2 cm     LVIDd: 4.9 cm  LVIDs: 3.8 cm  LVPWd:  1.3 cm  FS: 23.3 %  LV mass(C)d: 247.9 grams  LV mass(C)dI: 142.1 grams/m2  Ao root diam: 3.2 cm  LA dimension: 5.9 cm  asc Aorta Diam: 3.5 cm  LA/Ao: 1.8  LVOT diam: 2.0 cm  LVOT area: 3.3 cm2  LA Volume (BP): 146.0 ml  LA Volume Index (BP): 83.9 ml/m2  RWT: 0.52  TAPSE: 1.4 cm           Doppler Measurements & Calculations  MV E max vishnu: 136.0 cm/sec  MV A max vishnu: 40.7 cm/sec  MV E/A: 3.3  MV max PG: 10.9 mmHg  MV mean P.4 mmHg  MV V2 VTI: 33.8 cm  MVA(VTI): 1.7 cm2  MV dec time: 0.15 sec  Ao V2 max: 188.3 cm/sec  Ao max P.0 mmHg  Ao V2 mean: 122.5 cm/sec  Ao mean P.1 mmHg  Ao V2 VTI: 34.0 cm  LAUREEN(I,D): 1.7 cm2  LAUREEN(V,D): 1.7 cm2  AI P1/2t: 418.5 msec  LV V1 max PG: 3.7 mmHg  LV V1 max: 96.3 cm/sec  LV V1 VTI: 17.8 cm  SV(LVOT): 58.3 ml  SI(LVOT): 33.4 ml/m2  PA V2 max: 104.5 cm/sec  PA max P.4 mmHg  PA mean P.1 mmHg  PA V2 VTI: 21.6 cm  PA acc time: 0.07 sec  PI end-d vishnu: 114.0 cm/sec  TR max vishnu: 352.3 cm/sec  TR max P.6 mmHg  AV Vishnu Ratio (DI): 0.51  LAUREEN Index (cm2/m2): 0.98  E/E' av.0  Lateral E/e': 16.7     Medial E/e': 31.3           _____________________________________________________________________________  __           Report approved by: Billie Moseley 12/15/2020 03:43 PM            Discharge Medications   Current Discharge Medication List      START taking these medications    Details   hydrALAZINE (APRESOLINE) 25 MG tablet Take 0.5 tablets (12.5 mg) by mouth 3 times daily  Qty: 90 tablet, Refills: 3    Associated Diagnoses: Acute on chronic congestive heart failure, unspecified heart failure type (H)      isosorbide mononitrate (IMDUR) 30 MG 24 hr tablet Take 0.5 tablets (15 mg) by mouth daily  Qty: 45 tablet, Refills: 3    Associated Diagnoses: Acute on chronic congestive heart failure, unspecified heart failure type (H)      magnesium chloride 535 (64 Mg) MG TBEC CR tablet Take 1 tablet (535 mg) by mouth two times daily  Qty: 28 tablet, Refills: 0     Associated Diagnoses: Acute on chronic congestive heart failure, unspecified heart failure type (H)         CONTINUE these medications which have CHANGED    Details   !! torsemide (DEMADEX) 10 MG tablet Take 1 tablet (10 mg) by mouth daily  Qty: 90 tablet, Refills: 3    Associated Diagnoses: Acute on chronic congestive heart failure, unspecified heart failure type (H)      !! torsemide (DEMADEX) 5 MG tablet Take 1 tablet (5 mg) by mouth daily as needed (weight gain of 2-3 pounds in one day)  Qty:         !! - Potential duplicate medications found. Please discuss with provider.      CONTINUE these medications which have NOT CHANGED    Details   albuterol (PROAIR HFA/PROVENTIL HFA/VENTOLIN HFA) 108 (90 Base) MCG/ACT inhaler INHALE 1 TO 2 PUFFS BY MOUTH EVERY 4 TO 6 HOURS AS NEEDED  Qty: 18 g, Refills: 2    Associated Diagnoses: Moderate persistent asthma without complication      aspirin (ASA) 81 MG EC tablet Take 81 mg by mouth daily      budesonide (PULMICORT) 0.5 MG/2ML nebulizer solution Take 2 mLs (0.5 mg) by nebulization 2 times daily  Qty: 120 mL, Refills: 0    Associated Diagnoses: Moderate persistent asthma      calcium carbonate 600 mg-vitamin D 400 units (CALTRATE) 600-400 MG-UNIT per tablet Take 1 tablet by mouth daily      dabigatran ANTICOAGULANT (PRADAXA) 150 MG capsule Take 1 capsule (150 mg) by mouth 2 times daily Store in original 's bottle or blister pack; use within 120 days of opening.  Qty: 180 capsule, Refills: 3    Associated Diagnoses: Coronary artery disease involving native coronary artery of native heart without angina pectoris      ferrous sulfate (IRON) 325 (65 Fe) MG tablet TAKE 1 TABLET(325 MG) BY MOUTH TWICE DAILY  Qty: 180 tablet, Refills: 0    Associated Diagnoses: Anemia, unspecified type      ipratropium - albuterol 0.5 mg/2.5 mg/3 mL (DUONEB) 0.5-2.5 (3) MG/3ML nebulization Inhale 1 vial (3 mLs) into the lungs 4 times daily  Qty: 360 mL, Refills: 11    Associated  Diagnoses: Moderate persistent asthma, uncomplicated      lisinopril (ZESTRIL) 20 MG tablet Take 1 tablet (20 mg) by mouth daily  Qty: 90 tablet, Refills: 1    Associated Diagnoses: Severe aortic stenosis; Permanent atrial fibrillation (H); Thrombus of left atrial appendage      meclizine (ANTIVERT) 12.5 MG tablet Take 1 tablet (12.5 mg) by mouth 3 times daily as needed for dizziness  Qty: 90 tablet, Refills: 1    Associated Diagnoses: Dizziness      melatonin 3 MG tablet Take 1 tablet (3 mg) by mouth nightly as needed for sleep  Qty: 30 tablet, Refills: 0    Associated Diagnoses: Insomnia, unspecified type      metoprolol tartrate (LOPRESSOR) 25 MG tablet Take 1 tablet (25 mg) by mouth 2 times daily  Qty: 180 tablet, Refills: 0    Associated Diagnoses: Hypertension goal BP (blood pressure) < 140/90      montelukast (SINGULAIR) 10 MG tablet TAKE 1 TABLET(10 MG) BY MOUTH DAILY  Qty: 90 tablet, Refills: 1    Comments: **Patient requests 90 days supply**  Associated Diagnoses: Moderate persistent asthma      multivitamin, therapeutic (THERA-VIT) TABS tablet Take 1 tablet by mouth every evening       nitroGLYcerin (NITROSTAT) 0.4 MG sublingual tablet For chest pain place 1 tablet under the tongue every 5 minutes for 3 doses. If symptoms persist 5 minutes after 1st dose call 911.  Qty: 12 tablet, Refills: 0    Comments: Future refills by PCP Dr. Dany Rodriguez MD with phone number [phone].  Associated Diagnoses: Severe aortic stenosis; Complete heart block (H); Permanent atrial fibrillation (H); Thrombus of left atrial appendage      pantoprazole (PROTONIX) 40 MG EC tablet TAKE ONE TABLET BY MOUTH TWICE A DAY BEFORE MEALS  Qty: 60 tablet, Refills: 3    Associated Diagnoses: Ulcer of esophagus with bleeding      simvastatin (ZOCOR) 40 MG tablet TAKE 1 TABLET(40 MG) BY MOUTH AT BEDTIME  Qty: 90 tablet, Refills: 1    Associated Diagnoses: Hyperlipidemia with target LDL less than 100           Allergies   No  Known Allergies

## 2020-12-16 NOTE — PLAN OF CARE
Occupational Therapy Discharge Summary    Reason for therapy discharge:    Discharged to home.    Progress towards therapy goal(s). See goals on Care Plan in [facility] electronic health record for goal details.  Goals partially met.  Barriers to achieving goals:   discharge from facility.    Therapy recommendation(s):    recommend home with family A with strenuous IADL's ie cleaning, laundry, yard work, etc and cont'd walking program.

## 2020-12-16 NOTE — PLAN OF CARE
VSS on RA.  Tele: Afib with BBB.  A&Ox4.  Up ind.  Pt discharged to home with sons.   present during discharge.  AVS discussed with pt and all questions answered.  Instructed pt to  medications today.  Clothes, shoes, jacket, cell phone sent with pt.

## 2020-12-16 NOTE — PROGRESS NOTES
"[facility]    Cardiology Progress Note    Date of Service: 12/16/2020     Assessment & Plan   Followed by Dr. Field and Amee Rocha PA-C in CORE     Assessment & Plan   Shiraz Ingram is a 86 year old male complex medical history including aortic stenosis s/p TAVR 7-2020 complicated with CHB and pacemaker insertion, CAD with CABG in 2002 (LIMA to LAD, SVG TO OM AND Y graft to PDA and NASREEN on right; angiogram in April revealed moderate lesion at touchdown of PDA Y graft to RCA; patent NASREEN branch; patent LIMA), patent SVG to OM), chronic atrial fibrillation with hx of MAYA thrombus,on warfarin, UGIB 5-2020 with cautery, HTN, asthma, CKD,lung nodules, adrenal masses and panctyopenia  followed by Oncology, COVID + recovered and not admitted in November who was admitted on 12/15/2020 with shortness of breath and edema. He was admitted for shortness of breath and orthopnea consistent with acute on chronic HFpEF with valvular heart disease.             1. Acute on chronic HFpEF with orthopnea in the setting of a few pounds of weight gain  PHTN/HTN likely contributing to diastolic heart failure  Weight 146--141 (reported CORE dry weight 142)  Torsemide decreased 11-4 from 10mg to 5mg due to BUN 1.79 BUN 44  Now in IV lasix 60mg every 8 hours  Creat now 1.5 and BNP 8916 (3000 range in clinc0  Frequent HF admissions  Already down -2425 and weight down to 141.6  Has had 2 doses of IV lasix and diuresing very well.  CXR: small left pleural effusion  Leg cramps-->magnesium 1.8      RHC pre TAVR  Wedge before TAVR with weight at 166 PA 67/33 mean 44 PCWP 29 RAP17     PLAN:     If repeat admissions for brittle HFpEF after adequately vasodilated, he may benefit from RHC to assess new \"dry\" weight  -consider OP sleep study  -Support hose  -restart Torsemide 10mg daily   -extra 5mg torsemide at home for weight gain of 2 or more pounds in one day  (he has torsemide 5mg tabs and I sent in 10mg for him as " well)   -Add slo magnesium bid for 2 weeks-  -likely home today; CORE consult placed  -follow up scheduled next week with Dr. Field and CORE later in December     2. HTN with moderate to severe LVH  Pulmonary hypertension  -no evidence of ESHA on previous CT scans  -May be contributing to HF  Has had increased creatinine in the past on doses of Lisinopril above 20mg daily  -continue lopressor  -has lower extremity edema at home intermittently when on his feet so would avoid amlodipine plus he is on simvastatin 40mg daily  Bp much improved    PLAN:     -continue hydralazine and low dose nitrates--titrate as able as OP        3. Valvular Heart disease s/p TAVR in July with 1-2+ AI on last echo in October which appeared new  1-2+ TR and moderate pulmonary HTN with RVSP 50 plus RAP and reduced RV function  -ECHO: EF 50% (previous 55%); infero/inferolateral HK; midly decreased RV function  RVSP 46-55mmHg   Small amount AI mean gradient 7mmHg     4. Chronic atrial fibrillation  Continue BB and Pradaxa 150mg bid        5. CHB  S/p pacemaker post TAVR     6. Pancytopenia with renal and lung lesions  Followed by Oncology     7. CAD with previous CABG  LIMA to LAD, SVG TO OM AND Y graft to PDA and NASREEN on right; angiogram in April revealed moderate lesion at touchdown of PDA Y graft to RCA; patent NASREEN branch; patent LIMA), patent SVG to OM  trops negative  Continue medical therapy     8. GIB in May, cauterized bleeding esophageal ulcer  Hgb 9.2     9. CKD with baseline creatinine 1.3-1.6   currently 1.5-->1.61        10. COVID + in November-recovered  Negative now     11. Deafness-interpretor used for visit today     12.  Dyslipidemia  LDL in June was 54 HDL 48 triglycerides 87  -Continue simvastatin 40 mg/day    Graciela Hernandez, MSN, APRN, CNP  [facility]    Interval History      Feels much better today; no orthopnea or PND  Edema better    Review of Systems:  The Review of Systems is negative other than noted in the  HPI    Physical Exam   Temp: 97.8  F (36.6  C) Temp src: Oral BP: 139/70 Pulse: 61   Resp: 16 SpO2: 93 % O2 Device: None (Room air)    Vitals:    12/15/20 1013 12/15/20 1058 12/16/20 0402   Weight: 66.5 kg (146 lb 9.6 oz) 64.1 kg (141 lb 6 oz) 63.3 kg (139 lb 8 oz)     Vital Signs with Ranges  Temp:  [97.8  F (36.6  C)-98.4  F (36.9  C)] 97.8  F (36.6  C)  Pulse:  [59-74] 61  Resp:  [16-24] 16  BP: (119-171)/() 139/70  SpO2:  [93 %-99 %] 93 %  I/O last 3 completed shifts:  In: 900 [P.O.:900]  Out: 3400 [Urine:3400]    Constitutional     alert and oriented, in no acute distress.     Skin     warm and dry to touch    ENT     no pallor or cyanosis    Neck    Supple, JVP 10, bilateral carotid bruits    Chest     no tenderness to palpation     Lungs  basiliar crackles; dullness at left base    Cardiac  Irregularly-irregular rhythm, S1 normal, S2 normal, No S3 or S4, I/VI systolic murmur, no rubs    Abdomen     abdomen soft, bowel sounds normoactive, no hepatosplenomegaly    Extremities and Back     no clubbing, cyanosis. No edema observed.        Neurological     no gross motor deficits noted, affect appropriate, oriented to time, person and place.        Medications       aspirin  81 mg Oral Daily     budesonide  0.5 mg Nebulization BID     calcium carbonate 600 mg-vitamin D 400 units  1 tablet Oral Daily     dabigatran ANTICOAGULANT  150 mg Oral BID     ferrous sulfate  325 mg Oral Daily     hydrALAZINE  12.5 mg Oral TID     ipratropium - albuterol 0.5 mg/2.5 mg/3 mL  3 mL Nebulization 4x daily     isosorbide mononitrate  15 mg Oral Daily     lisinopril  20 mg Oral Daily     metoprolol tartrate  25 mg Oral BID     montelukast  10 mg Oral Daily     multivitamin, therapeutic  1 tablet Oral QPM     pantoprazole  40 mg Oral BID AC     simvastatin  40 mg Oral At Bedtime     sodium chloride (PF)  3 mL Intracatheter Q8H          Data:     ROUTINE IP LABS (Last four results)  BMP  Recent Labs   Lab 12/16/20  0556  12/15/20  2249 12/15/20  0324     --  138   POTASSIUM 3.8 3.8 3.8   CHLORIDE 100  --  105   AYALA 9.0  --  8.9   CO2 32  --  30   BUN 36*  --  29   CR 1.61*  --  1.50*   GLC 91  --  111*     CHOLESTEROL/HEPATICNo lab results found in last 7 days.  CBC  Recent Labs   Lab 20  0558 12/15/20  0324   WBC 4.0 3.8*   RBC 4.30* 4.48   HGB 8.8* 9.2*   HCT 28.2* 30.2*   MCV 66* 67*   MCH 20.5* 20.5*   MCHC 31.2* 30.5*   RDW 18.6* 18.9*   * 121*     TROP:   Recent Labs   Lab 12/15/20  1454 12/15/20  1137 12/15/20  0324   TROPI 0.023 0.030 0.036      BNP:    Recent Labs   Lab 12/15/20  0324   NTBNPI 8,916*     INRNo lab results found in last 7 days.  TSH   Date Value Ref Range Status   2013 1.66 0.4 - 5.0 mU/L Final       EKG results:  Reviewed if available     Imaging:  Recent Results (from the past 24 hour(s))   Echocardiogram Complete    Narrative    [id number]  [id number]  [id number]  [id number]^RAGHAV^JESSI^HEMANTH           [facility]  Echocardiography Laboratory  [address] [place], [place] [id number]        Name: SRIKANTH OBANDO  MRN: [id number]  : 1934  Study Date: 12/15/2020 12:29 PM  Age: 86 yrs  Gender: Male  Patient Location: [facility]  Reason For Study: Heart Failure  History: TAVR, PACEMAKER  Ordering Physician: JESSI AVILEZ  Referring Physician: NATHAN ROME  Performed By: Radames Rahman RDCS     BSA: 1.7 m2  Height: 67 in  Weight: 141 lb  HR: 48  BP: 171/110 mmHg  _____________________________________________________________________________  __        Procedure  Complete Portable Echo Adult. Optison (NDC #[id number]) given intravenously.  _____________________________________________________________________________  __        Interpretation Summary     1. The left ventricle is normal in size. The visual ejection fraction is  estimated at 50%. Basal to mid inferior and inferolateral hypokinesis.  2. The right ventricle is normal size. Mildly decreased right  ventricular  systolic function  3. There is moderate (2+) tricuspid regurgitation.  4. Moderate (46-55mmHg) pulmonary hypertension is present. The right  ventricular systolic pressure is approximated at 49mmHg plus the right atrial  pressure.  5. s/p TAVR, 29mm Medtronic Evolut Pro, implanted 7/2020. Mean 7mmHg, Vmax  1.9m/s, DI 0.51. Two small AI jets seen in apical views. Not seen in short  axis views. Suspect this is mild PVL.     Echo from 10-28-20 showed EF 55%, 1-2+ AI, TAVR with mean 7mmHg, Vmax 2.1m/s.  _____________________________________________________________________________  __        Left Ventricle  The left ventricle is normal in size. There is mild eccentric left ventricular  hypertrophy. The visual ejection fraction is estimated at 50%. Diastolic  function not assessed due to arrhythmia. Septal wall motion abnormality may  reflect pacemaker activation. Basal to mid inferior and inferolateral  hypokinesis.     Right Ventricle  The right ventricle is normal size. Mildly decreased right ventricular  systolic function. There is a pacemaker lead in the right ventricle.     Atria  There is severe biatrial enlargement. There is no atrial shunt seen.     Mitral Valve  There is trace mitral regurgitation.        Tricuspid Valve  There is moderate (2+) tricuspid regurgitation. Moderate (46-55mmHg) pulmonary  hypertension is present. The right ventricular systolic pressure is  approximated at 49mmHg plus the right atrial pressure.     Aortic Valve  There is a bioprosthetic aortic valve. s/p TAVR, 29mm Medtronic Evolut Pro,  implanted 7/2020. Mean 7mmHg, Vmax 1.9m/s, DI 0.51. Two small AI jets seen in  apical views. Not seen in short axis views. Suspect this is mild PVL.     Pulmonic Valve  The pulmonic valve is normal in structure and function.     Vessels  Normal ascending, transverse (arch), and descending aorta. The inferior vena  cava was normal in size with preserved respiratory variability.      Pericardium  There is no pericardial effusion.        Rhythm  The rhythm was paced.  _____________________________________________________________________________  __  MMode/2D Measurements & Calculations  IVSd: 1.2 cm     LVIDd: 4.9 cm  LVIDs: 3.8 cm  LVPWd: 1.3 cm  FS: 23.3 %  LV mass(C)d: 247.9 grams  LV mass(C)dI: 142.1 grams/m2  Ao root diam: 3.2 cm  LA dimension: 5.9 cm  asc Aorta Diam: 3.5 cm  LA/Ao: 1.8  LVOT diam: 2.0 cm  LVOT area: 3.3 cm2  LA Volume (BP): 146.0 ml  LA Volume Index (BP): 83.9 ml/m2  RWT: 0.52  TAPSE: 1.4 cm           Doppler Measurements & Calculations  MV E max vishnu: 136.0 cm/sec  MV A max vihsnu: 40.7 cm/sec  MV E/A: 3.3  MV max PG: 10.9 mmHg  MV mean P.4 mmHg  MV V2 VTI: 33.8 cm  MVA(VTI): 1.7 cm2  MV dec time: 0.15 sec  Ao V2 max: 188.3 cm/sec  Ao max P.0 mmHg  Ao V2 mean: 122.5 cm/sec  Ao mean P.1 mmHg  Ao V2 VTI: 34.0 cm  LAUREEN(I,D): 1.7 cm2  LAUREEN(V,D): 1.7 cm2  AI P1/2t: 418.5 msec  LV V1 max PG: 3.7 mmHg  LV V1 max: 96.3 cm/sec  LV V1 VTI: 17.8 cm  SV(LVOT): 58.3 ml  SI(LVOT): 33.4 ml/m2  PA V2 max: 104.5 cm/sec  PA max P.4 mmHg  PA mean P.1 mmHg  PA V2 VTI: 21.6 cm  PA acc time: 0.07 sec  PI end-d vishnu: 114.0 cm/sec  TR max vishnu: 352.3 cm/sec  TR max P.6 mmHg  AV Vishnu Ratio (DI): 0.51  LAUREEN Index (cm2/m2): 0.98  E/E' av.0  Lateral E/e': 16.7     Medial E/e': 31.3           _____________________________________________________________________________  __           Report approved by: Billie Moseley 12/15/2020 03:43 PM        Telemetry:

## 2020-12-16 NOTE — CONSULTS
REASON FOR ASSESSMENT:  CHF Consult for 2 gm NA Diet Education    NUTRITION HISTORY:  Information obtained from:  Patient via professional ASL interpretor     Living situation:   Lives with his two sons    Grocery shopping:  His sons have been doing all the shopping, they are familiar with the low sodium diet    Meal preparation:  He cooks M-F, his sons cook [employer]    - Patient consumes meals TID. He has been eating normally lately. Generally, all meals are made at home and Shiraz omits any salt called for in recipes.   - He does admit to using American cheese on his eggs, and eating soup frequently.     Previous diet instructions:  - Patient reported having been on a low sodium diet for several years. He is familiar with most of the recommendations, and even states that he does not need additional seasoning ideas.   - Asks appropriate questions.       CURRENT DIET:  2g Na, Low sat fat, No caffeine    NUTRITION DIAGNOSIS:  Food- and nutrition-related knowledge deficit R/t low sodium diet AEB patient asks pertinent questions regarding soups and salt     INTERVENTIONS:    Nutrition Prescription:  2g Na diet    Implementation:    Assessed learning needs, learning preferences, and willingness to learn    Nutrition Education (Content):  a) Provided handouts:  1) Tips for Low Na Diet  2) Label Reading  3) Low Na Foods/Drinks  4) Seasoning Your Food Without Salt  5) Low Na Recipe Booklet    Nutrition Education (Application):  a) Discussed current eating habits and recommended alternative food choices  b) Discussed lower sodium cheeses  c) Discussed looking for low sodium soups (low or reduced salt vs homemade)    Anticipated good compliance    Diet Education - refer to Education Flowsheet    Goals:    Patient verbalizes understanding of diet     All of the above goals met during the education session    Follow Up:    Provided RD contact information for future questions.    Recommend Out-Patient Nutrition Referral, if further  diet instructions are needed.    Prema Garcia RD, LD  [facility] [facility] [place]   Pager: [phone]  Weekend Pager: [phone]

## 2020-12-16 NOTE — CONSULTS
Care Management Initial Consult    General Information  Assessment completed with: Shiraz Asher with the use of   Type of CM/SW Visit: Offer D/C Planning    Primary Care Provider verified and updated as needed: Yes   Readmission within the last 30 days: no previous admission in last 30 days     Reason for Consult: discharge planning  Advance Care Planning:            Communication Assessment  Patient's communication style: spoken language (English or Bilingual)(ASL)    Hearing Difficulty or Deaf: yes   Wear Glasses or Blind: yes    Cognitive  Cognitive/Neuro/Behavioral: WDL                      Living Environment:   People in home: child(perla), adult, spouse     Current living Arrangements: house      Able to return to prior arrangements: yes       Family/Social Support:  Care provided by: self  Provides care for: no one  Marital Status:   Wife, Children          Description of Support System: Supportive, Involved         Current Resources:   Skilled Home Care Services:  None  Community Resources: None  Equipment currently used at home: grab bar, tub/shower  Supplies currently used at home:      Employment/Financial:  Employment Status: retired        Financial Concerns: No concerns identified           Lifestyle & Psychosocial Needs:  Lifestyle     Physical activity     Days per week: 4 days     Minutes per session: 30 min     Stress: Not on file        Socioeconomic History     Marital status:      Spouse name: Kailey     Number of children: 3     Years of education: Not on file     Highest education level: Not on file   Occupational History     Occupation: [employer]     Employer: RETIRED     Tobacco Use     Smoking status: Former Smoker     Types: Cigarettes     Smokeless tobacco: Never Used     Tobacco comment: smoked for a week in 20's   Substance and Sexual Activity     Alcohol use: Yes     Alcohol/week: 0.0 standard drinks     Drug use: No     Sexual activity: Yes      Partners: Female       Functional Status:  Prior to admission patient needed assistance:   Dependent ADLs:: Independent  Dependent IADLs:: Independent       Mental Health Status:  Mental Health Status: No Current Concerns       Chemical Dependency Status:  Chemical Dependency Status: No Current Concerns             Values/Beliefs:  Spiritual, Cultural Beliefs, Hinduism Practices, Values that affect care: no               Additional Information:    Care Management Discharge Note    Discharge Date:  12/16/20       Discharge Disposition: Home    Discharge Services: None    Discharge DME: None    Discharge Transportation: family or friend will provide    Private pay costs discussed: Not applicable    PAS Confirmation Code:    Patient/family educated on Medicare website which has current facility and service quality ratings: no    Education Provided on the Discharge Plan:  Pt  Persons Notified of Discharge Plans: Nicko  Patient/Family in Agreement with the Plan: yes    Handoff Referral Completed: Yes    Additional Information:   Received orders for discharge planning due to pt having CHF.  Met with pt with the use of .  Assisted pt with making a PCP appointment and called the kitchen for his lunch order.  Pt states his son Nicko will pick him up after work tonight. Pt has a scale that he weighs himself every morning after he uses the restroom.       A follow-up appointment has been made for this patient.       23 Office Visit with Dany Rodriguez MD, MD; smith Cash MD  Wednesday Dec 23, 2020 2:40 PM  Bring a current list of meds and any records pertaining to this visit.  For Physicals, please bring immunization records and any forms needing to be filled out. Please arrive 10 minutes early to complete paperwork. [facility] [address] [place] [address]  [phone]         Kay Ramos RN BAN  Inpatient Care Coordination    [address]  [place] [address]  [email or web address]  [email or web address]   Office: [phone]  Fax: [phone]

## 2020-12-16 NOTE — PROVIDER NOTIFICATION
MD Notification    Notified Person: MD Dr. Giron    Notified Person Name: Dr. Giron    Notification Date/Time: 12/15/2020 2202    Notification Interaction: Amcom    Purpose of Notification: ARIEL.. 258-2  Pt experiencing frequent painful leg cramps. Is there anything we can give him to help alleviate these? Thanks! Jaquelin RN *[id number]    Orders Received:    Comments:

## 2020-12-17 ENCOUNTER — PATIENT OUTREACH (OUTPATIENT)
Dept: CARE COORDINATION | Facility: CLINIC | Age: 85
End: 2020-12-17

## 2020-12-17 ENCOUNTER — TELEPHONE (OUTPATIENT)
Dept: CARDIOLOGY | Facility: CLINIC | Age: 85
End: 2020-12-17

## 2020-12-17 LAB — INTERPRETATION ECG - MUSE: NORMAL

## 2020-12-17 NOTE — LETTER
December 18, 2020    Dear Shriaz,                                                                         You were recently referred to the [facility]'s Clinic Care Coordination service.  This is a service offered through your Primary Care Clinic which can help you access resources and services in regard to your health and well-being goals. The clinic Community Health Worker has placed two calls to you to discuss the nature of this service and to offer enrollment.  If you are interested in learning more about Clinic Care Coordination, please call your Primary Care Clinic's Community Health Worker, Mandy, at [phone].                                                    Sincerely,                                                                              CIARAN Galvan                                                                                          Clinic Care Coordination                                                  [facility]

## 2020-12-17 NOTE — PROGRESS NOTES
Clinic Care Coordination Contact  [facility]/Voicemail       Clinical Data: Care Coordinator Outreach  Outreach attempted x 1.  Left message on pt's son's voicemail with call back information and requested return call.  Plan: Care Coordinator will try to reach patient again in 1-2 business days.                  Reason for Referral: Care Transition: Pt back in for CHF. Pt uses [place] Sign Language for communication. Following with CORE clinic.  Additional pertinent details:   Clinical Staff have discussed the Care Coordination Referral with the patient and/or caregiver: no

## 2020-12-17 NOTE — TELEPHONE ENCOUNTER
RN called back patient's son and reviewed with him the plan of care and confirmed f/u apt with Dr. Field on 12/21/20 at 315pm with labs prior at 215pm. Patient's son verbalized understanding and is in agreement with plan. Patient's son reports that patient is doing well and he has no other questions or concerns at this time. Patient's son will call our office should he or patient have any questions or concerns prior to apt on 12/21/20.

## 2020-12-17 NOTE — TELEPHONE ENCOUNTER
"RN called patient's son Forrest to review plan with him and to inquire how patient is doing since discharge from the hospital. Patient's son informed RN he was not able to talk right now and requested callback later. RN will attempt to call patient's son later this afternoon.         12/16/20 cardiology progress note  Assessment & Plan     Followed by Dr. Field and Amee Rocha PA-C in CORE     Assessment & Plan   Shiraz Ingram is a 86 year old male complex medical history including aortic stenosis s/p TAVR 7-2020 complicated with CHB and pacemaker insertion, CAD with CABG in 2002 (LIMA to LAD, SVG TO OM AND Y graft to PDA and NASREEN on right; angiogram in April revealed moderate lesion at touchdown of PDA Y graft to RCA; patent NASREEN branch; patent LIMA), patent SVG to OM), chronic atrial fibrillation with hx of MAYA thrombus,on warfarin, UGIB 5-2020 with cautery, HTN, asthma, CKD,lung nodules, adrenal masses and panctyopenia  followed by Oncology, COVID + recovered and not admitted in November who was admitted on 12/15/2020 with shortness of breath and edema. He was admitted for shortness of breath and orthopnea consistent with acute on chronic HFpEF with valvular heart disease.              1. Acute on chronic HFpEF with orthopnea in the setting of a few pounds of weight gain  PHTN/HTN likely contributing to diastolic heart failure  Weight 146--141 (reported CORE dry weight 142)  Torsemide decreased 11-4 from 10mg to 5mg due to BUN 1.79 BUN 44  Now in IV lasix 60mg every 8 hours  Creat now 1.5 and BNP 8916 (3000 range in clinc0  Frequent HF admissions  Already down -2425 and weight down to 141.6  Has had 2 doses of IV lasix and diuresing very well.  CXR: small left pleural effusion  Leg cramps-->magnesium 1.8        RHC pre TAVR  Wedge before TAVR with weight at 166 PA 67/33 mean 44 PCWP 29 RAP17     PLAN:     If repeat admissions for brittle HFpEF after adequately vasodilated, he may benefit from RHC to assess new \"dry\" " weight  -consider OP sleep study  -Support hose  -restart Torsemide 10mg daily   -extra 5mg torsemide at home for weight gain of 2 or more pounds in one day  (he has torsemide 5mg tabs and I sent in 10mg for him as well)   -Add slo magnesium bid for 2 weeks-  -likely home today; CORE consult placed  -follow up scheduled next week with Dr. Field and CORE later in December     2. HTN with moderate to severe LVH  Pulmonary hypertension  -no evidence of ESHA on previous CT scans  -May be contributing to HF  Has had increased creatinine in the past on doses of Lisinopril above 20mg daily  -continue lopressor  -has lower extremity edema at home intermittently when on his feet so would avoid amlodipine plus he is on simvastatin 40mg daily  Bp much improved     PLAN:     -continue hydralazine and low dose nitrates--titrate as able as OP        3. Valvular Heart disease s/p TAVR in July with 1-2+ AI on last echo in October which appeared new  1-2+ TR and moderate pulmonary HTN with RVSP 50 plus RAP and reduced RV function  -ECHO: EF 50% (previous 55%); infero/inferolateral HK; midly decreased RV function  RVSP 46-55mmHg   Small amount AI mean gradient 7mmHg     4. Chronic atrial fibrillation  Continue BB and Pradaxa 150mg bid        5. CHB  S/p pacemaker post TAVR     6. Pancytopenia with renal and lung lesions  Followed by Oncology     7. CAD with previous CABG  LIMA to LAD, SVG TO OM AND Y graft to PDA and NASREEN on right; angiogram in April revealed moderate lesion at touchdown of PDA Y graft to RCA; patent NASREEN branch; patent LIMA), patent SVG to OM  trops negative  Continue medical therapy     8. GIB in May, cauterized bleeding esophageal ulcer  Hgb 9.2     9. CKD with baseline creatinine 1.3-1.6   currently 1.5-->1.61        10. COVID + in November-recovered  Negative now     11. Deafness-interpretor used for visit today     12.  Dyslipidemia  LDL in June was 54 HDL 48 triglycerides 87  -Continue simvastatin 40  mg/day     Graciela Hernandez, MSN, APRN, CNP  [facility]

## 2020-12-18 NOTE — RESULT ENCOUNTER NOTE
Let him know that WBC, hemoglobin and platelet is mildly low. Will continue to monitor it. Have CBC in 3 months.    Joon Morris MD

## 2020-12-18 NOTE — PROGRESS NOTES
Community Health Worker called and left a message for the patient. Patient has been mailed a unreachable letter and was provided with CHW contact information if they are interested in accessing Clinic Care Coordination.  Order for Care Management has been closed, no further outreach will be done at this time and patient can be re-referred.

## 2020-12-21 ENCOUNTER — OFFICE VISIT (OUTPATIENT)
Dept: CARDIOLOGY | Facility: CLINIC | Age: 85
End: 2020-12-21
Attending: PHYSICIAN ASSISTANT
Payer: COMMERCIAL

## 2020-12-21 VITALS
HEIGHT: 68 IN | OXYGEN SATURATION: 97 % | DIASTOLIC BLOOD PRESSURE: 78 MMHG | SYSTOLIC BLOOD PRESSURE: 159 MMHG | HEART RATE: 58 BPM | BODY MASS INDEX: 22.16 KG/M2 | WEIGHT: 146.2 LBS

## 2020-12-21 DIAGNOSIS — I50.9 ACUTE ON CHRONIC CONGESTIVE HEART FAILURE, UNSPECIFIED HEART FAILURE TYPE (H): ICD-10-CM

## 2020-12-21 LAB
ANION GAP SERPL CALCULATED.3IONS-SCNC: 5 MMOL/L (ref 3–14)
BUN SERPL-MCNC: 43 MG/DL (ref 7–30)
CALCIUM SERPL-MCNC: 9.2 MG/DL (ref 8.5–10.1)
CHLORIDE SERPL-SCNC: 108 MMOL/L (ref 94–109)
CO2 SERPL-SCNC: 28 MMOL/L (ref 20–32)
CREAT SERPL-MCNC: 1.61 MG/DL (ref 0.66–1.25)
GFR SERPL CREATININE-BSD FRML MDRD: 38 ML/MIN/{1.73_M2}
GLUCOSE SERPL-MCNC: 90 MG/DL (ref 70–99)
NT-PROBNP SERPL-MCNC: 4442 PG/ML (ref 0–450)
POTASSIUM SERPL-SCNC: 4.7 MMOL/L (ref 3.4–5.3)
SODIUM SERPL-SCNC: 141 MMOL/L (ref 133–144)

## 2020-12-21 PROCEDURE — 36415 COLL VENOUS BLD VENIPUNCTURE: CPT | Performed by: PHYSICIAN ASSISTANT

## 2020-12-21 PROCEDURE — 83880 ASSAY OF NATRIURETIC PEPTIDE: CPT | Performed by: PHYSICIAN ASSISTANT

## 2020-12-21 PROCEDURE — 80048 BASIC METABOLIC PNL TOTAL CA: CPT | Performed by: PHYSICIAN ASSISTANT

## 2020-12-21 PROCEDURE — T1013 SIGN LANG/ORAL INTERPRETER: HCPCS | Mod: U3 | Performed by: INTERNAL MEDICINE

## 2020-12-21 PROCEDURE — 99213 OFFICE O/P EST LOW 20 MIN: CPT | Performed by: INTERNAL MEDICINE

## 2020-12-21 ASSESSMENT — MIFFLIN-ST. JEOR: SCORE: 1317.66

## 2020-12-21 NOTE — PROGRESS NOTES
HPI and Plan:   See dictation    Orders Placed This Encounter   Procedures     Follow-Up with Cardiologist       No orders of the defined types were placed in this encounter.      Encounter Diagnosis   Name Primary?     Acute on chronic congestive heart failure, unspecified heart failure type (H)        CURRENT MEDICATIONS:  Current Outpatient Medications   Medication Sig Dispense Refill     albuterol (PROAIR HFA/PROVENTIL HFA/VENTOLIN HFA) 108 (90 Base) MCG/ACT inhaler INHALE 1 TO 2 PUFFS BY MOUTH EVERY 4 TO 6 HOURS AS NEEDED 18 g 2     aspirin (ASA) 81 MG EC tablet Take 81 mg by mouth daily       budesonide (PULMICORT) 0.5 MG/2ML nebulizer solution Take 2 mLs (0.5 mg) by nebulization 2 times daily 120 mL 0     calcium carbonate 600 mg-vitamin D 400 units (CALTRATE) 600-400 MG-UNIT per tablet Take 1 tablet by mouth daily       dabigatran ANTICOAGULANT (PRADAXA) 150 MG capsule Take 1 capsule (150 mg) by mouth 2 times daily Store in original 's bottle or blister pack; use within 120 days of opening. 180 capsule 3     ferrous sulfate (IRON) 325 (65 Fe) MG tablet TAKE 1 TABLET(325 MG) BY MOUTH TWICE DAILY 180 tablet 0     hydrALAZINE (APRESOLINE) 25 MG tablet Take 0.5 tablets (12.5 mg) by mouth 3 times daily 90 tablet 3     ipratropium - albuterol 0.5 mg/2.5 mg/3 mL (DUONEB) 0.5-2.5 (3) MG/3ML nebulization Inhale 1 vial (3 mLs) into the lungs 4 times daily 360 mL 11     isosorbide mononitrate (IMDUR) 30 MG 24 hr tablet Take 0.5 tablets (15 mg) by mouth daily 45 tablet 3     lisinopril (ZESTRIL) 20 MG tablet Take 1 tablet (20 mg) by mouth daily 90 tablet 1     magnesium chloride 535 (64 Mg) MG TBEC CR tablet Take 1 tablet (535 mg) by mouth two times daily 28 tablet 0     meclizine (ANTIVERT) 12.5 MG tablet Take 1 tablet (12.5 mg) by mouth 3 times daily as needed for dizziness 90 tablet 1     melatonin 3 MG tablet Take 1 tablet (3 mg) by mouth nightly as needed for sleep 30 tablet 0     metoprolol tartrate  (LOPRESSOR) 25 MG tablet Take 1 tablet (25 mg) by mouth 2 times daily 180 tablet 0     montelukast (SINGULAIR) 10 MG tablet TAKE 1 TABLET(10 MG) BY MOUTH DAILY 90 tablet 1     multivitamin, therapeutic (THERA-VIT) TABS tablet Take 1 tablet by mouth every evening        nitroGLYcerin (NITROSTAT) 0.4 MG sublingual tablet For chest pain place 1 tablet under the tongue every 5 minutes for 3 doses. If symptoms persist 5 minutes after 1st dose call 911. 12 tablet 0     pantoprazole (PROTONIX) 40 MG EC tablet TAKE ONE TABLET BY MOUTH TWICE A DAY BEFORE MEALS 60 tablet 3     simvastatin (ZOCOR) 40 MG tablet TAKE 1 TABLET(40 MG) BY MOUTH AT BEDTIME 90 tablet 1     torsemide (DEMADEX) 10 MG tablet Take 1 tablet (10 mg) by mouth daily 90 tablet 3     torsemide (DEMADEX) 5 MG tablet Take 1 tablet (5 mg) by mouth daily as needed (weight gain of 2-3 pounds in one day)         ALLERGIES   No Known Allergies    PAST MEDICAL HISTORY:  Past Medical History:   Diagnosis Date     Allergic rhinitis, cause unspecified      Anemia, unspecified      Aortic stenosis     severe     Benign neoplasm of colon 1999    Ademonatous polyp     CKD (chronic kidney disease) stage 3, GFR 30-59 ml/min 05/12/2011     Coronary atherosclerosis of unspecified type of vessel, native or graft     s/p CABG 2002     Hyperlipidemia LDL goal < 100      Hypertension goal BP (blood pressure) < 140/90      Localized osteoarthrosis not specified whether primary or secondary, lower leg     left knee     Moderate persistent asthma      Pacemaker      Persistent atrial fibrillation (H)      Unspecified hearing loss        PAST SURGICAL HISTORY:  Past Surgical History:   Procedure Laterality Date     CARDIAC SURGERY       COLONOSCOPY N/A 5/26/2020    Procedure: COLONOSCOPY;  Surgeon: Ignacio Fournier MD;  Location:  GI     CV ANGIOGRAM CORONARY GRAFT N/A 4/24/2020    Procedure: Angiogram Coronary Graft;  Surgeon: Addison Willard MD;  Location:  HEART  CARDIAC CATH LAB     CV CORONARY ANGIOGRAM N/A 2020    Procedure: Coronary Angiogram;  Surgeon: Addison Willard MD;  Location:  HEART CARDIAC CATH LAB     CV RIGHT HEART CATH N/A 2020    Procedure: Right Heart Cath;  Surgeon: Addison Willard MD;  Location:  HEART CARDIAC CATH LAB     CV TRANSCATHETER AORTIC VALVE REPLACEMENT N/A 2020    Procedure: Transcatheter Aortic Valve Replacement;  Surgeon: Soraida Monet MD;  Location:  HEART CARDIAC CATH LAB     EP PACEMAKER N/A 7/15/2020    Procedure: EP Pacemaker;  Surgeon: Tommie Sauer MD;  Location:  HEART CARDIAC CATH LAB     HC COLONOSCOPY THRU STOMA W BIOPSY/CAUTERY TUMOR/POLYP/LESION      Dr. Dow, Q 5 years     HC COLONOSCOPY THRU STOMA W BIOPSY/CAUTERY TUMOR/POLYP/LESION      Dr. Dow, one adenomatous polyp     HC ESOPHAGOSCOPY, DIAGNOSTIC      Dr. Dow, lower esophageal ring & mild gastritis     HERNIORRHAPHY INGUINAL Right 2016    Procedure: HERNIORRHAPHY INGUINAL;  Surgeon: Alexis Alexandra MD;  Location: [facility]     LAPAROSCOPIC CHOLECYSTECTOMY      for biliary sludge     ZZC NONSPECIFIC PROCEDURE      Coronary artery bypass       FAMILY HISTORY:  Family History   Problem Relation Age of Onset     C.A.D. Father      Cancer Mother         breast cancer,  of pneumonia     Cerebrovascular Disease Son      CABG Son      Family History Negative Child      Family History Negative Sister      Heart Disease Brother        SOCIAL HISTORY:  Social History     Socioeconomic History     Marital status:      Spouse name: Kailey     Number of children: 3     Years of education: None     Highest education level: None   Occupational History     Occupation: upholstery     Employer: RETIRED   Social Needs     Financial resource strain: None     Food insecurity     Worry: None     Inability: None     Transportation needs     Medical: None     Non-medical: None   Tobacco Use      Smoking status: Former Smoker     Types: Cigarettes     Smokeless tobacco: Never Used     Tobacco comment: smoked for a week in 20's   Substance and Sexual Activity     Alcohol use: Not Currently     Alcohol/week: 0.0 standard drinks     Drug use: No     Sexual activity: Yes     Partners: Female   Lifestyle     Physical activity     Days per week: 4 days     Minutes per session: 30 min     Stress: None   Relationships     Social connections     Talks on phone: None     Gets together: None     Attends Congregation service: None     Active member of club or organization: None     Attends meetings of clubs or organizations: None     Relationship status: None     Intimate partner violence     Fear of current or ex partner: None     Emotionally abused: None     Physically abused: None     Forced sexual activity: None   Other Topics Concern      Service No     Blood Transfusions No     Caffeine Concern Not Asked     Occupational Exposure Not Asked     Hobby Hazards Not Asked     Sleep Concern Not Asked     Stress Concern Not Asked     Weight Concern Not Asked     Special Diet Not Asked     Back Care Not Asked     Exercise Yes     Bike Helmet Not Asked     Seat Belt Yes     Self-Exams No     Parent/sibling w/ CABG, MI or angioplasty before 65F 55M? Yes   Social History Narrative    Balanced Diet - Yes    Osteoporosis Preventative measures-  Dairy servings per day: 2 to 3 servings daily    Regular Exercise -  Yes Describe walks 1.5 mile daily     Dental Exam up - YES - Date: 2 years ago    Eye Exam - YES - Date: 1 year ago    Self Testicular Exam -  No, handout given    Do you have any concerns about STD's -  No    Abuse: Current or Past (Physical, Sexual or Emotional)- No    Do you feel safe in your environment - Yes    Guns stored in the home - Yes, locked    Sunscreen used - No    Seatbelts used - Yes    Lipids - YES - Date: 3-09    Glucose -  YES - Date: 3-09    Colon Cancer Screening - Colonoscopy 3-08(date  "completed)    Hemoccults - UNKNOWN    PSA - YES - Date: 11-07    Digital Rectal Exam - UNKNOWN    Immunizations reviewed and up to date - Yes, td 1-2005, had shingles vaccine    5-28-09  JANEY Patel CMA                       Review of Systems:  Skin:  Negative     Eyes:  Positive for glasses  ENT:  Positive for hearing loss  Respiratory:  Negative    Cardiovascular:  Negative    Gastroenterology: Positive for heartburn  Genitourinary:  Negative    Musculoskeletal:  Negative    Neurologic:  Negative    Psychiatric:  Negative    Heme/Lymph/Imm:  Negative    Endocrine:  Negative      Physical Exam:  Vitals: BP (!) 159/78   Pulse 58   Ht 1.727 m (5' 8\")   Wt 66.3 kg (146 lb 3.2 oz)   SpO2 97%   BMI 22.23 kg/m      Constitutional:  in no acute distress chronically ill      Skin:  warm and dry to the touch, no apparent skin lesions or masses noted   pacemaker incision in the left infraclavicular area was well-healed      Head:  normocephalic, no masses or lesions        Eyes:  pupils equal and round, conjunctivae and lids unremarkable, sclera white, no xanthalasma, EOMS intact, no nystagmus        Lymph:No Cervical lymphadenopathy present     ENT:  no pallor or cyanosis, dentition good        Neck:  JVP normal        Respiratory:  normal breath sounds, clear to auscultation, normal A-P diameter, normal symmetry, normal respiratory excursion, no use of accessory muscles         Cardiac: apical impulse not displaced;normal S1 and S2 irregular rhythm     systolic murmur grade 1        pulses below the femoral arteries are diminished                                      GI:  abdomen soft, non-tender, BS normoactive, no mass, no HSM, no bruits        Extremities and Muscular Skeletal:  no deformities, clubbing, cyanosis, erythema observed              Neurological:  no gross motor deficits        Psych:  Alert and Oriented x 3        Recent Lab Results:  LIPID RESULTS:  Lab Results   Component Value Date    CHOL 119 " 06/01/2020    HDL 48 06/01/2020    LDL 54 06/01/2020    TRIG 87 06/01/2020    CHOLHDLRATIO 3.0 07/07/2015       LIVER ENZYME RESULTS:  Lab Results   Component Value Date    AST 16 09/28/2020    ALT 19 09/28/2020       CBC RESULTS:  Lab Results   Component Value Date    WBC 4.0 12/16/2020    RBC 4.30 (L) 12/16/2020    HGB 8.8 (L) 12/16/2020    HCT 28.2 (L) 12/16/2020    MCV 66 (L) 12/16/2020    MCH 20.5 (L) 12/16/2020    MCHC 31.2 (L) 12/16/2020    RDW 18.6 (H) 12/16/2020     (L) 12/16/2020       BMP RESULTS:  Lab Results   Component Value Date     12/21/2020    POTASSIUM 4.7 12/21/2020    CHLORIDE 108 12/21/2020    CO2 28 12/21/2020    ANIONGAP 5 12/21/2020    GLC 90 12/21/2020    BUN 43 (H) 12/21/2020    CR 1.61 (H) 12/21/2020    GFRESTIMATED 38 (L) 12/21/2020    GFRESTBLACK 44 (L) 12/21/2020    AYALA 9.2 12/21/2020        A1C RESULTS:  No results found for: A1C    INR RESULTS:  Lab Results   Component Value Date    INR 1.21 (H) 07/13/2020    INR 1.25 (H) 06/03/2020           CC  Graciela Hernandez, APRN CNP  [address]  [place] [address]

## 2020-12-21 NOTE — LETTER
12/21/2020      Dany Rodriguez MD, MD  [address] [place] [id number]      RE: Shiraz Ingram       Dear Colleague,    I had the pleasure of seeing Shiraz Ingram in the [facility].    Service Date: 12/21/2020      HISTORY OF PRESENT ILLNESS:  It is a pleasure for me to see Mr. Ingram again for followup of his multiple cardiac conditions.      I last saw this gentleman in 04/2020 when he was hospitalized with heart failure.  As outlined in a multitude of notes from this year, this is a gentleman with chronic coronary artery disease and severe aortic stenosis as well as permanent atrial fibrillation, GI bleeding from esophageal ulceration, chronic anemia, asthma, together with COVID infection from which he has nicely recovered.      He had critical aortic stenosis and he eventually underwent TAVR with placement of a 29 mm Evolut valve in July. This procedure had to be delayed due to presence of left atrial appendage clot.  Shortly after that, he had a high-grade AV block and a permanent pacer has been implanted.      Since 07/2020, he has had a few more admissions with diastolic heart failure.  The prosthetic aortic valve is functioning just fine.      In fact, he was most recently discharged just 3-4 days ago.  Unsurprisingly, he feels fine at this time as my hospital colleagues have done a great job in diuresing him again.        He had some followup labs today.  His creatinine is stable at 1.61 with a GFR of 38.  NT-proBNP is 4,442, which is significantly improved from admission of 8,916, just several days ago.        Correspondingly, he tells me he feels great with no shortness of breath.  No orthopnea.  Clinically, he appears euvolemic.  Blood pressure was initially a little high when he arrived in our offices.  When I rechecked it, it was 142/80.  I think this would be fine for a gentleman of his age.  Most recent hemoglobin is 8.8.      PHYSICAL EXAMINATION:  He  appears euvolemic.  His weight at home today was 138 pounds.  Somewhat surprisingly, we weighed him at 146 pounds in our office today.  His dry weight is somewhere around 145 pounds.  All medications appear well tolerated.      He denies symptoms of GI bleeding.      ASSESSMENT:  I am happy to see that this gentleman is euvolemic at this time.  Unfortunately, with his multiple serious medical conditions, he is at high likelihood for readmission in the near future, be it for pneumonia or diastolic heart failure exacerbation.  Currently, he appears to be euvolemic and he is on multiple cardiac medications.  I think a blood pressure of around 140-150 systolic would be just fine for him and would have no bearing at all on causing heart failure exacerbations.      He is under close followup in our clinic and is scheduled to see Amee in about 10 days' time.  He knows the importance of salt restriction.  He weighs himself daily.        I wished him a happy holiday season and I provisionally scheduled to see him again in about 3 months' time for further followup.         SANDRA ASTUDILLO MD, [facility]             D: 2020   T: 2020   MT: GREG      Name:     SRIKANTH OBANDO   MRN:      [id number]        Account:      [id number]   :      1934           Service Date: 2020      Document: [id number]            Outpatient Encounter Medications as of 2020   Medication Sig Dispense Refill     albuterol (PROAIR HFA/PROVENTIL HFA/VENTOLIN HFA) 108 (90 Base) MCG/ACT inhaler INHALE 1 TO 2 PUFFS BY MOUTH EVERY 4 TO 6 HOURS AS NEEDED 18 g 2     aspirin (ASA) 81 MG EC tablet Take 81 mg by mouth daily       budesonide (PULMICORT) 0.5 MG/2ML nebulizer solution Take 2 mLs (0.5 mg) by nebulization 2 times daily 120 mL 0     calcium carbonate 600 mg-vitamin D 400 units (CALTRATE) 600-400 MG-UNIT per tablet Take 1 tablet by mouth daily       dabigatran ANTICOAGULANT (PRADAXA) 150 MG capsule Take 1 capsule (150 mg) by mouth 2  times daily Store in original 's bottle or blister pack; use within 120 days of opening. 180 capsule 3     ferrous sulfate (IRON) 325 (65 Fe) MG tablet TAKE 1 TABLET(325 MG) BY MOUTH TWICE DAILY 180 tablet 0     hydrALAZINE (APRESOLINE) 25 MG tablet Take 0.5 tablets (12.5 mg) by mouth 3 times daily 90 tablet 3     ipratropium - albuterol 0.5 mg/2.5 mg/3 mL (DUONEB) 0.5-2.5 (3) MG/3ML nebulization Inhale 1 vial (3 mLs) into the lungs 4 times daily 360 mL 11     isosorbide mononitrate (IMDUR) 30 MG 24 hr tablet Take 0.5 tablets (15 mg) by mouth daily 45 tablet 3     lisinopril (ZESTRIL) 20 MG tablet Take 1 tablet (20 mg) by mouth daily 90 tablet 1     magnesium chloride 535 (64 Mg) MG TBEC CR tablet Take 1 tablet (535 mg) by mouth two times daily 28 tablet 0     meclizine (ANTIVERT) 12.5 MG tablet Take 1 tablet (12.5 mg) by mouth 3 times daily as needed for dizziness 90 tablet 1     melatonin 3 MG tablet Take 1 tablet (3 mg) by mouth nightly as needed for sleep 30 tablet 0     metoprolol tartrate (LOPRESSOR) 25 MG tablet Take 1 tablet (25 mg) by mouth 2 times daily 180 tablet 0     montelukast (SINGULAIR) 10 MG tablet TAKE 1 TABLET(10 MG) BY MOUTH DAILY 90 tablet 1     multivitamin, therapeutic (THERA-VIT) TABS tablet Take 1 tablet by mouth every evening        nitroGLYcerin (NITROSTAT) 0.4 MG sublingual tablet For chest pain place 1 tablet under the tongue every 5 minutes for 3 doses. If symptoms persist 5 minutes after 1st dose call 911. 12 tablet 0     pantoprazole (PROTONIX) 40 MG EC tablet TAKE ONE TABLET BY MOUTH TWICE A DAY BEFORE MEALS 60 tablet 3     simvastatin (ZOCOR) 40 MG tablet TAKE 1 TABLET(40 MG) BY MOUTH AT BEDTIME 90 tablet 1     torsemide (DEMADEX) 10 MG tablet Take 1 tablet (10 mg) by mouth daily 90 tablet 3     torsemide (DEMADEX) 5 MG tablet Take 1 tablet (5 mg) by mouth daily as needed (weight gain of 2-3 pounds in one day)       No facility-administered encounter medications on file  as of 12/21/2020.        Again, thank you for allowing me to participate in the care of your patient.      Sincerely,    DR SANDRA ASTUDILLO MD     [facility]

## 2020-12-21 NOTE — LETTER
12/21/2020    Dany Rodriguez MD, MD  [address] [place] [id number]    RE: Shiraz Ingram       Dear Colleague,    I had the pleasure of seeing Shiraz Ingram in the [facility].    HPI and Plan:   See dictation    Orders Placed This Encounter   Procedures     Follow-Up with Cardiologist       No orders of the defined types were placed in this encounter.      Encounter Diagnosis   Name Primary?     Acute on chronic congestive heart failure, unspecified heart failure type (H)        CURRENT MEDICATIONS:  Current Outpatient Medications   Medication Sig Dispense Refill     albuterol (PROAIR HFA/PROVENTIL HFA/VENTOLIN HFA) 108 (90 Base) MCG/ACT inhaler INHALE 1 TO 2 PUFFS BY MOUTH EVERY 4 TO 6 HOURS AS NEEDED 18 g 2     aspirin (ASA) 81 MG EC tablet Take 81 mg by mouth daily       budesonide (PULMICORT) 0.5 MG/2ML nebulizer solution Take 2 mLs (0.5 mg) by nebulization 2 times daily 120 mL 0     calcium carbonate 600 mg-vitamin D 400 units (CALTRATE) 600-400 MG-UNIT per tablet Take 1 tablet by mouth daily       dabigatran ANTICOAGULANT (PRADAXA) 150 MG capsule Take 1 capsule (150 mg) by mouth 2 times daily Store in original 's bottle or blister pack; use within 120 days of opening. 180 capsule 3     ferrous sulfate (IRON) 325 (65 Fe) MG tablet TAKE 1 TABLET(325 MG) BY MOUTH TWICE DAILY 180 tablet 0     hydrALAZINE (APRESOLINE) 25 MG tablet Take 0.5 tablets (12.5 mg) by mouth 3 times daily 90 tablet 3     ipratropium - albuterol 0.5 mg/2.5 mg/3 mL (DUONEB) 0.5-2.5 (3) MG/3ML nebulization Inhale 1 vial (3 mLs) into the lungs 4 times daily 360 mL 11     isosorbide mononitrate (IMDUR) 30 MG 24 hr tablet Take 0.5 tablets (15 mg) by mouth daily 45 tablet 3     lisinopril (ZESTRIL) 20 MG tablet Take 1 tablet (20 mg) by mouth daily 90 tablet 1     magnesium chloride 535 (64 Mg) MG TBEC CR tablet Take 1 tablet (535 mg) by mouth two times daily 28 tablet 0     meclizine  (ANTIVERT) 12.5 MG tablet Take 1 tablet (12.5 mg) by mouth 3 times daily as needed for dizziness 90 tablet 1     melatonin 3 MG tablet Take 1 tablet (3 mg) by mouth nightly as needed for sleep 30 tablet 0     metoprolol tartrate (LOPRESSOR) 25 MG tablet Take 1 tablet (25 mg) by mouth 2 times daily 180 tablet 0     montelukast (SINGULAIR) 10 MG tablet TAKE 1 TABLET(10 MG) BY MOUTH DAILY 90 tablet 1     multivitamin, therapeutic (THERA-VIT) TABS tablet Take 1 tablet by mouth every evening        nitroGLYcerin (NITROSTAT) 0.4 MG sublingual tablet For chest pain place 1 tablet under the tongue every 5 minutes for 3 doses. If symptoms persist 5 minutes after 1st dose call 911. 12 tablet 0     pantoprazole (PROTONIX) 40 MG EC tablet TAKE ONE TABLET BY MOUTH TWICE A DAY BEFORE MEALS 60 tablet 3     simvastatin (ZOCOR) 40 MG tablet TAKE 1 TABLET(40 MG) BY MOUTH AT BEDTIME 90 tablet 1     torsemide (DEMADEX) 10 MG tablet Take 1 tablet (10 mg) by mouth daily 90 tablet 3     torsemide (DEMADEX) 5 MG tablet Take 1 tablet (5 mg) by mouth daily as needed (weight gain of 2-3 pounds in one day)         ALLERGIES   No Known Allergies    PAST MEDICAL HISTORY:  Past Medical History:   Diagnosis Date     Allergic rhinitis, cause unspecified      Anemia, unspecified      Aortic stenosis     severe     Benign neoplasm of colon 1999    Ademonatous polyp     CKD (chronic kidney disease) stage 3, GFR 30-59 ml/min 05/12/2011     Coronary atherosclerosis of unspecified type of vessel, native or graft     s/p CABG 2002     Hyperlipidemia LDL goal < 100      Hypertension goal BP (blood pressure) < 140/90      Localized osteoarthrosis not specified whether primary or secondary, lower leg     left knee     Moderate persistent asthma      Pacemaker      Persistent atrial fibrillation (H)      Unspecified hearing loss        PAST SURGICAL HISTORY:  Past Surgical History:   Procedure Laterality Date     CARDIAC SURGERY       COLONOSCOPY N/A  2020    Procedure: COLONOSCOPY;  Surgeon: Ignacio Fournier MD;  Location:  GI     CV ANGIOGRAM CORONARY GRAFT N/A 2020    Procedure: Angiogram Coronary Graft;  Surgeon: Addison Willard MD;  Location:  HEART CARDIAC CATH LAB     CV CORONARY ANGIOGRAM N/A 2020    Procedure: Coronary Angiogram;  Surgeon: Addison Willard MD;  Location:  HEART CARDIAC CATH LAB     CV RIGHT HEART CATH N/A 2020    Procedure: Right Heart Cath;  Surgeon: Addison Willard MD;  Location:  HEART CARDIAC CATH LAB     CV TRANSCATHETER AORTIC VALVE REPLACEMENT N/A 2020    Procedure: Transcatheter Aortic Valve Replacement;  Surgeon: Soraida Monet MD;  Location:  HEART CARDIAC CATH LAB     EP PACEMAKER N/A 7/15/2020    Procedure: EP Pacemaker;  Surgeon: Tommie Sauer MD;  Location:  HEART CARDIAC CATH LAB     HC COLONOSCOPY THRU STOMA W BIOPSY/CAUTERY TUMOR/POLYP/LESION      Dr. Dow, Q 5 years     HC COLONOSCOPY THRU STOMA W BIOPSY/CAUTERY TUMOR/POLYP/LESION      Dr. Dow, one adenomatous polyp     HC ESOPHAGOSCOPY, DIAGNOSTIC      Dr. Dow, lower esophageal ring & mild gastritis     HERNIORRHAPHY INGUINAL Right 2016    Procedure: HERNIORRHAPHY INGUINAL;  Surgeon: Alexis Alexandra MD;  [facility]     LAPAROSCOPIC CHOLECYSTECTOMY      for biliary sludge     ZZC NONSPECIFIC PROCEDURE      Coronary artery bypass       FAMILY HISTORY:  Family History   Problem Relation Age of Onset     C.A.D. Father      Cancer Mother         breast cancer,  of pneumonia     Cerebrovascular Disease Son      CABG Son      Family History Negative Child      Family History Negative Sister      Heart Disease Brother        SOCIAL HISTORY:  Social History     Socioeconomic History     Marital status:      Spouse name: Kailey     Number of children: 3     Years of education: None     Highest education level: None   Occupational History      Occupation: [employer]     Employer: RETIRED   Social Needs     Financial resource strain: None     Food insecurity     Worry: None     Inability: None     Transportation needs     Medical: None     Non-medical: None   Tobacco Use     Smoking status: Former Smoker     Types: Cigarettes     Smokeless tobacco: Never Used     Tobacco comment: smoked for a week in 20's   Substance and Sexual Activity     Alcohol use: Not Currently     Alcohol/week: 0.0 standard drinks     Drug use: No     Sexual activity: Yes     Partners: Female   Lifestyle     Physical activity     Days per week: 4 days     Minutes per session: 30 min     Stress: None   Relationships     Social connections     Talks on phone: None     Gets together: None     Attends Baptism service: None     Active member of club or organization: None     Attends meetings of clubs or organizations: None     Relationship status: None     Intimate partner violence     Fear of current or ex partner: None     Emotionally abused: None     Physically abused: None     Forced sexual activity: None   Other Topics Concern      Service No     Blood Transfusions No     Caffeine Concern Not Asked     Occupational Exposure Not Asked     Hobby Hazards Not Asked     Sleep Concern Not Asked     Stress Concern Not Asked     Weight Concern Not Asked     Special Diet Not Asked     Back Care Not Asked     Exercise Yes     Bike Helmet Not Asked     Seat Belt Yes     Self-Exams No     Parent/sibling w/ CABG, MI or angioplasty before 65F 55M? Yes   Social History Narrative    Balanced Diet - Yes    Osteoporosis Preventative measures-  Dairy servings per day: 2 to 3 servings daily    Regular Exercise -  Yes Describe walks 1.5 mile daily     Dental Exam up - YES - Date: 2 years ago    Eye Exam - YES - Date: 1 year ago    Self Testicular Exam -  No, handout given    Do you have any concerns about STD's -  No    Abuse: Current or Past (Physical, Sexual or Emotional)- No    Do you feel  "safe in your environment - Yes    Guns stored in the home - Yes, locked    Sunscreen used - No    Seatbelts used - Yes    Lipids - YES - Date: 3-09    Glucose -  YES - Date: 3-09    Colon Cancer Screening - Colonoscopy 3-08(date completed)    Hemoccults - UNKNOWN    PSA - YES - Date: 11-07    Digital Rectal Exam - UNKNOWN    Immunizations reviewed and up to date - Yes, td 1-2005, had shingles vaccine    5-28-09  JANEY Patel CMA                       Review of Systems:  Skin:  Negative     Eyes:  Positive for glasses  ENT:  Positive for hearing loss  Respiratory:  Negative    Cardiovascular:  Negative    Gastroenterology: Positive for heartburn  Genitourinary:  Negative    Musculoskeletal:  Negative    Neurologic:  Negative    Psychiatric:  Negative    Heme/Lymph/Imm:  Negative    Endocrine:  Negative      Physical Exam:  Vitals: BP (!) 159/78   Pulse 58   Ht 1.727 m (5' 8\")   Wt 66.3 kg (146 lb 3.2 oz)   SpO2 97%   BMI 22.23 kg/m      Constitutional:  in no acute distress chronically ill      Skin:  warm and dry to the touch, no apparent skin lesions or masses noted   pacemaker incision in the left infraclavicular area was well-healed      Head:  normocephalic, no masses or lesions        Eyes:  pupils equal and round, conjunctivae and lids unremarkable, sclera white, no xanthalasma, EOMS intact, no nystagmus        Lymph:No Cervical lymphadenopathy present     ENT:  no pallor or cyanosis, dentition good        Neck:  JVP normal        Respiratory:  normal breath sounds, clear to auscultation, normal A-P diameter, normal symmetry, normal respiratory excursion, no use of accessory muscles         Cardiac: apical impulse not displaced;normal S1 and S2 irregular rhythm     systolic murmur grade 1        pulses below the femoral arteries are diminished                                      GI:  abdomen soft, non-tender, BS normoactive, no mass, no HSM, no bruits        Extremities and Muscular Skeletal:  no " deformities, clubbing, cyanosis, erythema observed              Neurological:  no gross motor deficits        Psych:  Alert and Oriented x 3        Recent Lab Results:  LIPID RESULTS:  Lab Results   Component Value Date    CHOL 119 06/01/2020    HDL 48 06/01/2020    LDL 54 06/01/2020    TRIG 87 06/01/2020    CHOLHDLRATIO 3.0 07/07/2015       LIVER ENZYME RESULTS:  Lab Results   Component Value Date    AST 16 09/28/2020    ALT 19 09/28/2020       CBC RESULTS:  Lab Results   Component Value Date    WBC 4.0 12/16/2020    RBC 4.30 (L) 12/16/2020    HGB 8.8 (L) 12/16/2020    HCT 28.2 (L) 12/16/2020    MCV 66 (L) 12/16/2020    MCH 20.5 (L) 12/16/2020    MCHC 31.2 (L) 12/16/2020    RDW 18.6 (H) 12/16/2020     (L) 12/16/2020       BMP RESULTS:  Lab Results   Component Value Date     12/21/2020    POTASSIUM 4.7 12/21/2020    CHLORIDE 108 12/21/2020    CO2 28 12/21/2020    ANIONGAP 5 12/21/2020    GLC 90 12/21/2020    BUN 43 (H) 12/21/2020    CR 1.61 (H) 12/21/2020    GFRESTIMATED 38 (L) 12/21/2020    GFRESTBLACK 44 (L) 12/21/2020    AYALA 9.2 12/21/2020        A1C RESULTS:  No results found for: A1C    INR RESULTS:  Lab Results   Component Value Date    INR 1.21 (H) 07/13/2020    INR 1.25 (H) 06/03/2020           CC  EVA Lewis CNP  [address]  [place] [address]                    Thank you for allowing me to participate in the care of your patient.      Sincerely,     DR SANDRA ASTUDILLO MD     [facility]    cc:   EVA Lewis CNP  [address]  [place] [address]

## 2020-12-22 NOTE — PROGRESS NOTES
Service Date: 12/21/2020      HISTORY OF PRESENT ILLNESS:  It is a pleasure for me to see Mr. Ingram again for followup of his multiple cardiac conditions.      I last saw this gentleman in 04/2020 when he was hospitalized with heart failure.  As outlined in a multitude of notes from this year, this is a gentleman with chronic coronary artery disease and severe aortic stenosis as well as permanent atrial fibrillation, GI bleeding from esophageal ulceration, chronic anemia, asthma, together with COVID infection from which he has nicely recovered.      He had critical aortic stenosis and he eventually underwent TAVR with placement of a 29 mm Evolut valve in July. This procedure had to be delayed due to presence of left atrial appendage clot.  Shortly after that, he had a high-grade AV block and a permanent pacer has been implanted.      Since 07/2020, he has had a few more admissions with diastolic heart failure.  The prosthetic aortic valve is functioning just fine.      In fact, he was most recently discharged just 3-4 days ago.  Unsurprisingly, he feels fine at this time as my hospital colleagues have done a great job in diuresing him again.        He had some followup labs today.  His creatinine is stable at 1.61 with a GFR of 38.  NT-proBNP is 4,442, which is significantly improved from admission of 8,916, just several days ago.        Correspondingly, he tells me he feels great with no shortness of breath.  No orthopnea.  Clinically, he appears euvolemic.  Blood pressure was initially a little high when he arrived in our offices.  When I rechecked it, it was 142/80.  I think this would be fine for a gentleman of his age.  Most recent hemoglobin is 8.8.      PHYSICAL EXAMINATION:  He appears euvolemic.  His weight at home today was 138 pounds.  Somewhat surprisingly, we weighed him at 146 pounds in our office today.  His dry weight is somewhere around 145 pounds.  All medications appear well tolerated.      He  denies symptoms of GI bleeding.      ASSESSMENT:  I am happy to see that this gentleman is euvolemic at this time.  Unfortunately, with his multiple serious medical conditions, he is at high likelihood for readmission in the near future, be it for pneumonia or diastolic heart failure exacerbation.  Currently, he appears to be euvolemic and he is on multiple cardiac medications.  I think a blood pressure of around 140-150 systolic would be just fine for him and would have no bearing at all on causing heart failure exacerbations.      He is under close followup in our clinic and is scheduled to see Amee in about 10 days' time.  He knows the importance of salt restriction.  He weighs himself daily.        I wished him a happy holiday season and I provisionally scheduled to see him again in about 3 months' time for further followup.         SANDRA ASTUDILLO MD, [facility]             D: 2020   T: 2020   MT: GREG      Name:     SRIKANTH OBANDO   MRN:      [id number]        Account:      [id number]   :      1934           Service Date: 2020      Document: [id number]

## 2020-12-30 ENCOUNTER — OFFICE VISIT (OUTPATIENT)
Dept: CARDIOLOGY | Facility: CLINIC | Age: 85
End: 2020-12-30
Attending: PHYSICIAN ASSISTANT
Payer: COMMERCIAL

## 2020-12-30 ENCOUNTER — PATIENT OUTREACH (OUTPATIENT)
Dept: CARDIOLOGY | Facility: CLINIC | Age: 85
End: 2020-12-30

## 2020-12-30 ENCOUNTER — CARE COORDINATION (OUTPATIENT)
Dept: CARDIOLOGY | Facility: CLINIC | Age: 85
End: 2020-12-30

## 2020-12-30 VITALS
SYSTOLIC BLOOD PRESSURE: 119 MMHG | HEART RATE: 64 BPM | HEIGHT: 68 IN | WEIGHT: 142 LBS | BODY MASS INDEX: 21.52 KG/M2 | DIASTOLIC BLOOD PRESSURE: 48 MMHG

## 2020-12-30 DIAGNOSIS — I50.9 ACUTE ON CHRONIC CONGESTIVE HEART FAILURE, UNSPECIFIED HEART FAILURE TYPE (H): ICD-10-CM

## 2020-12-30 DIAGNOSIS — I48.21 PERMANENT ATRIAL FIBRILLATION (H): ICD-10-CM

## 2020-12-30 DIAGNOSIS — I51.3 THROMBUS OF LEFT ATRIAL APPENDAGE: ICD-10-CM

## 2020-12-30 DIAGNOSIS — I35.0 SEVERE AORTIC STENOSIS: ICD-10-CM

## 2020-12-30 DIAGNOSIS — I50.31 ACUTE DIASTOLIC HEART FAILURE (H): ICD-10-CM

## 2020-12-30 LAB
ANION GAP SERPL CALCULATED.3IONS-SCNC: 5 MMOL/L (ref 3–14)
BUN SERPL-MCNC: 63 MG/DL (ref 7–30)
CALCIUM SERPL-MCNC: 9.1 MG/DL (ref 8.5–10.1)
CHLORIDE SERPL-SCNC: 106 MMOL/L (ref 94–109)
CO2 SERPL-SCNC: 24 MMOL/L (ref 20–32)
CREAT SERPL-MCNC: 2.03 MG/DL (ref 0.66–1.25)
GFR SERPL CREATININE-BSD FRML MDRD: 29 ML/MIN/{1.73_M2}
GLUCOSE SERPL-MCNC: 101 MG/DL (ref 70–99)
NT-PROBNP SERPL-MCNC: 4451 PG/ML (ref 0–450)
POTASSIUM SERPL-SCNC: 4.7 MMOL/L (ref 3.4–5.3)
SODIUM SERPL-SCNC: 135 MMOL/L (ref 133–144)

## 2020-12-30 PROCEDURE — 83880 ASSAY OF NATRIURETIC PEPTIDE: CPT | Performed by: PHYSICIAN ASSISTANT

## 2020-12-30 PROCEDURE — 36415 COLL VENOUS BLD VENIPUNCTURE: CPT | Performed by: PHYSICIAN ASSISTANT

## 2020-12-30 PROCEDURE — 99214 OFFICE O/P EST MOD 30 MIN: CPT | Performed by: PHYSICIAN ASSISTANT

## 2020-12-30 PROCEDURE — T1013 SIGN LANG/ORAL INTERPRETER: HCPCS | Mod: U3 | Performed by: INTERNAL MEDICINE

## 2020-12-30 PROCEDURE — 80048 BASIC METABOLIC PNL TOTAL CA: CPT | Performed by: PHYSICIAN ASSISTANT

## 2020-12-30 RX ORDER — TORSEMIDE 10 MG/1
TABLET ORAL
Qty: 90 TABLET | Refills: 3 | COMMUNITY
Start: 2020-12-30 | End: 2021-02-12

## 2020-12-30 RX ORDER — LISINOPRIL 20 MG/1
10 TABLET ORAL DAILY
Qty: 90 TABLET | Refills: 1 | COMMUNITY
Start: 2020-12-30 | End: 2021-01-05

## 2020-12-30 ASSESSMENT — MIFFLIN-ST. JEOR: SCORE: 1298.61

## 2020-12-30 NOTE — PROGRESS NOTES
Cardiology Clinic Progress Note    Shiraz Ingram MRN# [id number]   YOB: 1934 Age: 86 year old   Primary cardiologist: Dr. Field         Assessment and Plan:     In summary, Shiraz Ingram presents today for a [facility] clinic follow up visit after a recurrent CHF admission. Unclear acute trigger. Diuretic had been reduced one month prior due to pre-renal azotemia. Today, he is feeling very well at a home weight of 136 lbs, clinic weight of 142 lbs. Labs today are pending.    Plan:  - Will review labs once back.   - Enroll in MHT with goal wt 137-144 lbs.   - Will message Dr. Morris re: follow up given declining hgb and recent admission.   - Will hold off on sleep study for now as he denies symptoms consistent with JOSÉ ANTONIO.  - Repeat RHC down the road would be helpful.    Follow-up:  - With [facility] DIAZ 2-3 weeks.   - Establish with [facility] MD within next 2 months.     It was a pleasure seeing Shiraz, as always!        History of Presenting Illness:      Shiraz Ingram is a pleasant 86 year old patient who presents today with an  for a follow up visit.    He has a pertinent medical history of the following -  # Chronic HFpEF in the setting of VHD  # PHTN  # Severe aortic stenosis s/p TAVR 7/2020, complicated by CHB requiring PPM implant  # History of stable CAD s/p CABG in 2002 [LIMA -LAD, VG-OM and the RCA], with known moderate touchdown lesion of the right PDA branch of the MLE-aSZF-aMT Y-graft; patent LIMA to LAD, vein graft to the OM, and right posterior lateral Y graft branch as of last angiogram in 4/2020.   # CAF, with hx of MAYA thrombus, on warfarin.  # Hx of upper GI bleed in May 2020, treated with cautery, on PPI  # HTN  # Asthma  # CKD-III    Unfortunately, Shiraz had a rough summer with multiple admissions. In July, he ununderwent TAVR with a 29 mm Medtronic valve with successful reduction valve gradients to 5. His procedure was complicated by CHB, requiring PPM implantation. His diuretics were  stopped. Several days post-discharge, he returned to the hospital with acute HFpEF. TTE was stable. He was diuresed and placed back on oral furosemide. When he saw Daksha Smith in follow-up in August, he appeared stable. He was then admitted end of September with pneumonia and RD, and his furosemide was again stopped in the hospital. This resulted in another admission for acute HFpEF in October. He was again diuresed and ultimately discharged on torsemide 10 mg daily. Also, his PTA lisinopril was reduced from 20 BID to daily, due to acute kidney injury.     When I saw him in follow-up, beginning of November, he was feeling really well with a stable home weight of 142.5 pounds.  Unfortunately, his labs appeared prerenal, with a creatinine that had risen from 1.54 --> 1.79, BUN from 39 --> 44.  I therefore reduced his torsemide from 10 to 5 mg daily, and asked him to notify me if his weight were climb to greater than 145 pounds.    Unfortunately, he was readmitted on December 15 with volume overload.  His weight at the time was 146 pounds.  TTE showed:  1. The left ventricle is normal in size. The visual ejection fraction is  estimated at 50%. Basal to mid inferior and inferolateral hypokinesis.  2. The right ventricle is normal size. Mildly decreased right ventricular  systolic function  3. There is moderate (2+) tricuspid regurgitation.  4. Moderate (46-55mmHg) pulmonary hypertension is present. The right  ventricular systolic pressure is approximated at 49mmHg plus the right atrial  pressure.  5. s/p TAVR, 29mm Medtronic Evolut Pro, implanted 7/2020. Mean 7mmHg, Vmax  1.9m/s, DI 0.51. Two small AI jets seen in apical views. Not seen in short  axis views. Suspect this is mild PVL.     Echo from 10-28-20 showed EF 55%, 1-2+ AI, TAVR with mean 7mmHg, Vmax 2.1m/s.    He again diuresed well with IV Lasix, and hydralazine and Imdur were added to his regimen for vasodilation.  Placed back on Torsemide 10 daily. Discharge  "weight was 140 pounds.    He saw Dr. Field in follow-up on December 21.  His weight was back up to 146 pounds.  However, Dr. Field felt him to be euvolemic and made no changes to his regimen.    Today, his weight is back down to 142 lbs. Labs are pending. BP is well-controlled. He denies PND, snoring, daytime somnolence. Home weights have varied between 140 and 136 over the past week, today 136 lbs. He's unclear on what triggered his exacerbation. Avoids sodium.   Saw Dr. Morris beginning of December, does not have follow up arranged. Hgb is dropping from 10's --> 8's. Likely contributing.          Review of Systems:     12-pt ROS is negative except for as noted in the HPI.          Physical Exam:     Vitals: /48   Pulse 64   Ht 1.727 m (5' 8\")   Wt 64.4 kg (142 lb)   BMI 21.59 kg/m    Wt Readings from Last 10 Encounters:   12/30/20 64.4 kg (142 lb)   12/21/20 66.3 kg (146 lb 3.2 oz)   12/16/20 63.3 kg (139 lb 8 oz)   11/09/20 65.8 kg (145 lb)   11/04/20 66.2 kg (145 lb 14.4 oz)   10/30/20 64.7 kg (142 lb 11.2 oz)   10/27/20 69.4 kg (153 lb)   10/12/20 71.8 kg (158 lb 3.2 oz)   10/01/20 68.1 kg (150 lb 1.6 oz)   09/22/20 71 kg (156 lb 9.6 oz)       Constitutional:  Patient is pleasant, alert, cooperative, and in NAD.  HEENT:  NCAT. PERRLA. EOM's intact.   Neck:  CVP appears normal. No carotid bruits.   Pulmonary: Normal respiratory effort. CTAB.   Cardiac: RRR, normal S1/S2, no S3/S4, no murmur or rub.   Abdomen:  Non-tender abdomen, no hepatosplenomegaly appreciated.   Vascular: Pulses in the upper and lower extremities are 2+ and equal bilaterally.  Extremities: No edema, erythema, cyanosis or tenderness appreciated.  Skin:  No rashes or lesions appreciated.   Neurological:  No gross motor or sensory deficits.   Psych: Appropriate affect.        Data:     Labs reviewed:  Recent Labs   Lab Test 12/21/20  1412 12/08/20  0957 11/04/20  1325 06/01/20  0907 06/01/20  0907 06/12/19  0818 06/12/19  0818 12/12/18  0840 " 02/21/13  1702 02/21/13  1702   LDL  --   --   --   --  54  --  74 63   < >  --    HDL  --   --   --   --  48  --  46 52   < >  --    NHDL  --   --   --   --  71  --  90 77   < >  --    CHOL  --   --   --   --  119  --  136 129   < >  --    TRIG  --   --   --   --  87  --  82 71   < >  --    TSH  --   --   --   --   --   --   --   --   --  1.66   NTBNP 4,442*  --  3,042*  --   --   --   --   --   --   --    IRON  --  89  --    < >  --    < > 117  --    < > <10*   FEB  --  316  --    < >  --    < > 312  --    < > 219*   IRONSAT  --  28  --    < >  --    < > 38  --    < > <5*   RJ  --  607*  --    < >  --    < > 602*  --    < > 2,750*    < > = values in this interval not displayed.       Lab Results   Component Value Date    WBC 4.0 12/16/2020    RBC 4.30 (L) 12/16/2020    HGB 8.8 (L) 12/16/2020    HCT 28.2 (L) 12/16/2020    MCV 66 (L) 12/16/2020    MCH 20.5 (L) 12/16/2020    MCHC 31.2 (L) 12/16/2020    RDW 18.6 (H) 12/16/2020     (L) 12/16/2020       Lab Results   Component Value Date     12/21/2020    POTASSIUM 4.7 12/21/2020    CHLORIDE 108 12/21/2020    CO2 28 12/21/2020    ANIONGAP 5 12/21/2020    GLC 90 12/21/2020    BUN 43 (H) 12/21/2020    CR 1.61 (H) 12/21/2020    GFRESTIMATED 38 (L) 12/21/2020    GFRESTBLACK 44 (L) 12/21/2020    AYALA 9.2 12/21/2020      Lab Results   Component Value Date    AST 16 09/28/2020    ALT 19 09/28/2020       No results found for: A1C    Lab Results   Component Value Date    INR 1.21 (H) 07/13/2020    INR 1.25 (H) 06/03/2020           Problem List:     Patient Active Problem List   Diagnosis     Coronary atherosclerosis     Benign neoplasm of colon     Localized osteoarthrosis, lower leg     Allergic rhinitis     Moderate persistent asthma     Anemia     Osteoporosis     Hypertension goal BP (blood pressure) < 140/90     Hyperlipidemia with target LDL less than 100     CKD (chronic kidney disease) stage 3, GFR 30-59 ml/min     Nonrheumatic aortic valve stenosis      Advanced directives, counseling/discussion     Iron deficiency anemia     Vitamin B 12 deficiency     Health Care Home     Mild persistent asthma without complication     Acute respiratory failure (H)     Acute on chronic congestive heart failure, unspecified heart failure type (H)     Severe aortic stenosis     Coronary artery disease involving native coronary artery of native heart without angina pectoris     S/P CABG (coronary artery bypass graft)     Hyperkalemia     GI bleed     Pneumonia     Atrial fibrillation (H)     Thrombus of left atrial appendage     Complete heart block (H)     Aortic stenosis, severe     Acute exacerbation of CHF (congestive heart failure) (H)     Community acquired pneumonia     Acute congestive heart failure, unspecified heart failure type (H)     History of transcatheter aortic valve replacement (TAVR)           Medications:     Current Outpatient Medications   Medication Sig Dispense Refill     albuterol (PROAIR HFA/PROVENTIL HFA/VENTOLIN HFA) 108 (90 Base) MCG/ACT inhaler INHALE 1 TO 2 PUFFS BY MOUTH EVERY 4 TO 6 HOURS AS NEEDED 18 g 2     aspirin (ASA) 81 MG EC tablet Take 81 mg by mouth daily       budesonide (PULMICORT) 0.5 MG/2ML nebulizer solution Take 2 mLs (0.5 mg) by nebulization 2 times daily 120 mL 0     calcium carbonate 600 mg-vitamin D 400 units (CALTRATE) 600-400 MG-UNIT per tablet Take 1 tablet by mouth daily       dabigatran ANTICOAGULANT (PRADAXA) 150 MG capsule Take 1 capsule (150 mg) by mouth 2 times daily Store in original 's bottle or blister pack; use within 120 days of opening. 180 capsule 3     ferrous sulfate (IRON) 325 (65 Fe) MG tablet TAKE 1 TABLET(325 MG) BY MOUTH TWICE DAILY 180 tablet 0     hydrALAZINE (APRESOLINE) 25 MG tablet Take 0.5 tablets (12.5 mg) by mouth 3 times daily 90 tablet 3     ipratropium - albuterol 0.5 mg/2.5 mg/3 mL (DUONEB) 0.5-2.5 (3) MG/3ML nebulization Inhale 1 vial (3 mLs) into the lungs 4 times daily 360 mL 11      isosorbide mononitrate (IMDUR) 30 MG 24 hr tablet Take 0.5 tablets (15 mg) by mouth daily 45 tablet 3     lisinopril (ZESTRIL) 20 MG tablet Take 1 tablet (20 mg) by mouth daily 90 tablet 1     magnesium chloride 535 (64 Mg) MG TBEC CR tablet Take 1 tablet (535 mg) by mouth two times daily 28 tablet 0     meclizine (ANTIVERT) 12.5 MG tablet Take 1 tablet (12.5 mg) by mouth 3 times daily as needed for dizziness 90 tablet 1     melatonin 3 MG tablet Take 1 tablet (3 mg) by mouth nightly as needed for sleep 30 tablet 0     metoprolol tartrate (LOPRESSOR) 25 MG tablet Take 1 tablet (25 mg) by mouth 2 times daily 180 tablet 0     montelukast (SINGULAIR) 10 MG tablet TAKE 1 TABLET(10 MG) BY MOUTH DAILY 90 tablet 1     multivitamin, therapeutic (THERA-VIT) TABS tablet Take 1 tablet by mouth every evening        nitroGLYcerin (NITROSTAT) 0.4 MG sublingual tablet For chest pain place 1 tablet under the tongue every 5 minutes for 3 doses. If symptoms persist 5 minutes after 1st dose call 911. 12 tablet 0     pantoprazole (PROTONIX) 40 MG EC tablet TAKE ONE TABLET BY MOUTH TWICE A DAY BEFORE MEALS 60 tablet 3     simvastatin (ZOCOR) 40 MG tablet TAKE 1 TABLET(40 MG) BY MOUTH AT BEDTIME 30 tablet 0     torsemide (DEMADEX) 10 MG tablet Take 1 tablet (10 mg) by mouth daily 90 tablet 3     torsemide (DEMADEX) 5 MG tablet Take 1 tablet (5 mg) by mouth daily as needed (weight gain of 2-3 pounds in one day)             Past Medical History:     Past Medical History:   Diagnosis Date     Allergic rhinitis, cause unspecified      Anemia, unspecified      Aortic stenosis     severe     Benign neoplasm of colon 1999    Ademonatous polyp     CKD (chronic kidney disease) stage 3, GFR 30-59 ml/min 05/12/2011     Coronary atherosclerosis of unspecified type of vessel, native or graft     s/p CABG 2002     Hyperlipidemia LDL goal < 100      Hypertension goal BP (blood pressure) < 140/90      Localized osteoarthrosis not specified whether  primary or secondary, lower leg     left knee     Moderate persistent asthma      Pacemaker      Persistent atrial fibrillation (H)      Unspecified hearing loss      Past Surgical History:   Procedure Laterality Date     CARDIAC SURGERY       COLONOSCOPY N/A 2020    Procedure: COLONOSCOPY;  Surgeon: Ignacio Fournier MD;  Location:  GI     CV ANGIOGRAM CORONARY GRAFT N/A 2020    Procedure: Angiogram Coronary Graft;  Surgeon: Addison Willard MD;  Location:  HEART CARDIAC CATH LAB     CV CORONARY ANGIOGRAM N/A 2020    Procedure: Coronary Angiogram;  Surgeon: Addison Willard MD;  Location:  HEART CARDIAC CATH LAB     CV RIGHT HEART CATH N/A 2020    Procedure: Right Heart Cath;  Surgeon: Addison Willard MD;  Location:  HEART CARDIAC CATH LAB     CV TRANSCATHETER AORTIC VALVE REPLACEMENT N/A 2020    Procedure: Transcatheter Aortic Valve Replacement;  Surgeon: Soraida Monet MD;  Location:  HEART CARDIAC CATH LAB     EP PACEMAKER N/A 7/15/2020    Procedure: EP Pacemaker;  Surgeon: Tommie Sauer MD;  Location:  HEART CARDIAC CATH LAB     HC COLONOSCOPY THRU STOMA W BIOPSY/CAUTERY TUMOR/POLYP/LESION      Dr. Dow, Q 5 years     HC COLONOSCOPY THRU STOMA W BIOPSY/CAUTERY TUMOR/POLYP/LESION      Dr. Dow, one adenomatous polyp     HC ESOPHAGOSCOPY, DIAGNOSTIC      Dr. Dow, lower esophageal ring & mild gastritis     HERNIORRHAPHY INGUINAL Right 2016    Procedure: HERNIORRHAPHY INGUINAL;  Surgeon: Alexis Alexandra MD;  Location: [facility]     LAPAROSCOPIC CHOLECYSTECTOMY      for biliary sludge     ZC NONSPECIFIC PROCEDURE      Coronary artery bypass     Family History   Problem Relation Age of Onset     C.A.D. Father      Cancer Mother         breast cancer,  of pneumonia     Cerebrovascular Disease Son      CABG Son      Family History Negative Child      Family History Negative Sister      Heart Disease  Brother      Social History     Socioeconomic History     Marital status:      Spouse name: Kailey     Number of children: 3     Years of education: Not on file     Highest education level: Not on file   Occupational History     Occupation: [employer]     Employer: RETIRED   Social Needs     Financial resource strain: Not on file     Food insecurity     Worry: Not on file     Inability: Not on file     Transportation needs     Medical: Not on file     Non-medical: Not on file   Tobacco Use     Smoking status: Former Smoker     Types: Cigarettes     Smokeless tobacco: Never Used     Tobacco comment: smoked for a week in 20's   Substance and Sexual Activity     Alcohol use: Not Currently     Alcohol/week: 0.0 standard drinks     Drug use: No     Sexual activity: Yes     Partners: Female   Lifestyle     Physical activity     Days per week: 4 days     Minutes per session: 30 min     Stress: Not on file   Relationships     Social connections     Talks on phone: Not on file     Gets together: Not on file     Attends Caodaism service: Not on file     Active member of club or organization: Not on file     Attends meetings of clubs or organizations: Not on file     Relationship status: Not on file     Intimate partner violence     Fear of current or ex partner: Not on file     Emotionally abused: Not on file     Physically abused: Not on file     Forced sexual activity: Not on file   Other Topics Concern      Service No     Blood Transfusions No     Caffeine Concern Not Asked     Occupational Exposure Not Asked     Hobby Hazards Not Asked     Sleep Concern Not Asked     Stress Concern Not Asked     Weight Concern Not Asked     Special Diet Not Asked     Back Care Not Asked     Exercise Yes     Bike Helmet Not Asked     Seat Belt Yes     Self-Exams No     Parent/sibling w/ CABG, MI or angioplasty before 65F 55M? Yes   Social History Narrative    Balanced Diet - Yes    Osteoporosis Preventative measures-  Dairy  servings per day: 2 to 3 servings daily    Regular Exercise -  Yes Describe walks 1.5 mile daily     Dental Exam up - YES - Date: 2 years ago    Eye Exam - YES - Date: 1 year ago    Self Testicular Exam -  No, handout given    Do you have any concerns about STD's -  No    Abuse: Current or Past (Physical, Sexual or Emotional)- No    Do you feel safe in your environment - Yes    Guns stored in the home - Yes, locked    Sunscreen used - No    Seatbelts used - Yes    Lipids - YES - Date: 3-09    Glucose -  YES - Date: 3-09    Colon Cancer Screening - Colonoscopy 3-08(date completed)    Hemoccults - UNKNOWN    PSA - YES - Date: 11-07    Digital Rectal Exam - UNKNOWN    Immunizations reviewed and up to date - Yes, td 1-2005, had shingles vaccine    5-28-09  JANEY Patel CMA                           Allergies:   Patient has no known allergies.      Amee Rocha PA-C  [facility] - [facility]  Pager: [phone]

## 2020-12-30 NOTE — PROGRESS NOTES
[facility]  Son Willi returned call, reviewed lab results and instructions to decrease Torsemide to 5mg daily and Lisinopril to 10mg daily. Willi wrote down instructions. Reviewed need for repeat BMP next week. Pt has in clinic PCP visit at  PCP on Monday. Asked Willi to have pt get labs when there on Monday, BMP order placed in Epic.     Messaged [facility] RN board to watch for results.     Rhoda Hager, RN, BSN, CHFN  12/30/20 at 3:38 PM

## 2020-12-30 NOTE — PATIENT INSTRUCTIONS
"Today, we discussed the following:   We will get you enrolled in the [employer] system today. We will set your \"goal weight\" after your lab results return today.   I will also message Dr. Morris to see when he wants to see you in follow up for your anemia.   - Results: Pending - will let you know.   - Medication changes:  No changes for the moment.   - Follow up: With [facility] clinic DIAZ in 2 weeks + repeat labs prior.     Please, remember to continue the followin.  Weigh yourself daily. Call if your weight is up > than 2 pounds in one day, or 5 pounds in one week; if you feel more short of breath or have worsening swelling in your legs or abdomen.  2.  Eat a low sodium diet (less than 2,000mg or 2g daily). If you eat less salt, you will retain less fluid.   3.  Avoid alcohol, as this can worsen heart failure.   4.  Avoid NSAIDs as able (For example, Ibuprofen / aleve / advil / naprosen / diclofenac).    Please call [facility] nurse for any questions or concerns Mon-Fri 8am-4pm:   [phone]  For concerns after hours:   [phone] option 2   Scheduling phone number:   [phone]    Thank you for visiting with us today.   Amee Rocha PA-C  ______________________________________________________________  "

## 2020-12-30 NOTE — TELEPHONE ENCOUNTER
[facility]    Patient orders entered for enrollment into [employer]. Patient will start transmitting Thurs 12/31/20.  Erica Sam RN on 12/30/2020 at 10:56 AM

## 2020-12-30 NOTE — PROGRESS NOTES
[facility]  Amee Rocha PA-C [facility] Nurse             Worsening renal failure. Please have him reduce the torsemide to 5 mg daily and reduce lisinopril from 20 to 10 mg daily. Reassure him we will monitor the weights closely with this. Repeat BMP next week. MHT goal weight range will be 138 - 143 lbs for now. He weighed 136 lbs at home today. Thank you.        T weight parameters updated. Called ariana Clayton to review lab results and changes. No answer, LVM asking for call back to review. Updated medication list to reflect changes.     Rhoda Hager, RN, BSN, CHFN  12/30/20 at 2:44 PM

## 2020-12-30 NOTE — LETTER
12/30/2020    Dany Rodriguez MD, MD  [address] [place] [id number]    RE: Shiraz Ingram       Dear Colleague,    I had the pleasure of seeing Shiraz Ingram in the [facility].    Cardiology Clinic Progress Note    Shiraz Ingram MRN# [id number]   YOB: 1934 Age: 86 year old   Primary cardiologist: Dr. Field         Assessment and Plan:     In summary, Shiraz Ingram presents today for a CORE clinic follow up visit after a recurrent CHF admission. Unclear acute trigger. Diuretic had been reduced one month prior due to pre-renal azotemia. Today, he is feeling very well at a home weight of 136 lbs, clinic weight of 142 lbs. Labs today are pending.    Plan:  - Will review labs once back.   - Enroll in MHT with goal wt 137-144 lbs.   - Will message Dr. Morris re: follow up given declining hgb and recent admission.   - Will hold off on sleep study for now as he denies symptoms consistent with JOSÉ ANTONIO.  - Repeat RHC down the road would be helpful.    Follow-up:  - With CORE DIAZ 2-3 weeks.   - Establish with CORE MD within next 2 months.     It was a pleasure seeing Shiraz, as always!        History of Presenting Illness:      Shiraz Ingram is a pleasant 86 year old patient who presents today with an  for a follow up visit.    He has a pertinent medical history of the following -  # Chronic HFpEF in the setting of VHD  # PHTN  # Severe aortic stenosis s/p TAVR 7/2020, complicated by CHB requiring PPM implant  # History of stable CAD s/p CABG in 2002 [LIMA -LAD, VG-OM and the RCA], with known moderate touchdown lesion of the right PDA branch of the JPV-[id number]-dKF Y-graft; patent LIMA to LAD, vein graft to the OM, and right posterior lateral Y graft branch as of last angiogram in 4/2020.   # CAF, with hx of MAYA thrombus, on warfarin.  # Hx of upper GI bleed in May 2020, treated with cautery, on PPI  # HTN  # Asthma  # CKD-III    Unfortunately, Shiraz had a rough summer  with multiple admissions. In July, he ununderwent TAVR with a 29 mm Medtronic valve with successful reduction valve gradients to 5. His procedure was complicated by CHB, requiring PPM implantation. His diuretics were stopped. Several days post-discharge, he returned to the hospital with acute HFpEF. TTE was stable. He was diuresed and placed back on oral furosemide. When he saw Daksha Smith in follow-up in August, he appeared stable. He was then admitted end of September with pneumonia and RD, and his furosemide was again stopped in the hospital. This resulted in another admission for acute HFpEF in October. He was again diuresed and ultimately discharged on torsemide 10 mg daily. Also, his PTA lisinopril was reduced from 20 BID to daily, due to acute kidney injury.     When I saw him in follow-up, beginning of November, he was feeling really well with a stable home weight of 142.5 pounds.  Unfortunately, his labs appeared prerenal, with a creatinine that had risen from 1.54 --> 1.79, BUN from 39 --> 44.  I therefore reduced his torsemide from 10 to 5 mg daily, and asked him to notify me if his weight were climb to greater than 145 pounds.    Unfortunately, he was readmitted on December 15 with volume overload.  His weight at the time was 146 pounds.  TTE showed:  1. The left ventricle is normal in size. The visual ejection fraction is  estimated at 50%. Basal to mid inferior and inferolateral hypokinesis.  2. The right ventricle is normal size. Mildly decreased right ventricular  systolic function  3. There is moderate (2+) tricuspid regurgitation.  4. Moderate (46-55mmHg) pulmonary hypertension is present. The right  ventricular systolic pressure is approximated at 49mmHg plus the right atrial  pressure.  5. s/p TAVR, 29mm Medtronic Evolut Pro, implanted 7/2020. Mean 7mmHg, Vmax  1.9m/s, DI 0.51. Two small AI jets seen in apical views. Not seen in short  axis views. Suspect this is mild PVL.     Echo from 10-28-20  "showed EF 55%, 1-2+ AI, TAVR with mean 7mmHg, Vmax 2.1m/s.    He again diuresed well with IV Lasix, and hydralazine and Imdur were added to his regimen for vasodilation.  Placed back on Torsemide 10 daily. Discharge weight was 140 pounds.    He saw Dr. Field in follow-up on December 21.  His weight was back up to 146 pounds.  However, Dr. Field felt him to be euvolemic and made no changes to his regimen.    Today, his weight is back down to 142 lbs. Labs are pending. BP is well-controlled. He denies PND, snoring, daytime somnolence. Home weights have varied between 140 and 136 over the past week, today 136 lbs. He's unclear on what triggered his exacerbation. Avoids sodium.   Saw Dr. Morris beginning of December, does not have follow up arranged. Hgb is dropping from 10's --> 8's. Likely contributing.          Review of Systems:     12-pt ROS is negative except for as noted in the HPI.          Physical Exam:     Vitals: /48   Pulse 64   Ht 1.727 m (5' 8\")   Wt 64.4 kg (142 lb)   BMI 21.59 kg/m    Wt Readings from Last 10 Encounters:   12/30/20 64.4 kg (142 lb)   12/21/20 66.3 kg (146 lb 3.2 oz)   12/16/20 63.3 kg (139 lb 8 oz)   11/09/20 65.8 kg (145 lb)   11/04/20 66.2 kg (145 lb 14.4 oz)   10/30/20 64.7 kg (142 lb 11.2 oz)   10/27/20 69.4 kg (153 lb)   10/12/20 71.8 kg (158 lb 3.2 oz)   10/01/20 68.1 kg (150 lb 1.6 oz)   09/22/20 71 kg (156 lb 9.6 oz)       Constitutional:  Patient is pleasant, alert, cooperative, and in NAD.  HEENT:  NCAT. PERRLA. EOM's intact.   Neck:  CVP appears normal. No carotid bruits.   Pulmonary: Normal respiratory effort. CTAB.   Cardiac: RRR, normal S1/S2, no S3/S4, no murmur or rub.   Abdomen:  Non-tender abdomen, no hepatosplenomegaly appreciated.   Vascular: Pulses in the upper and lower extremities are 2+ and equal bilaterally.  Extremities: No edema, erythema, cyanosis or tenderness appreciated.  Skin:  No rashes or lesions appreciated.   Neurological:  No gross motor or " sensory deficits.   Psych: Appropriate affect.        Data:     Labs reviewed:  Recent Labs   Lab Test 12/21/20  1412 12/08/20  0957 11/04/20  1325 06/01/20  0907 06/01/20  0907 06/12/19  0818 06/12/19  0818 12/12/18  0840 02/21/13  1702 02/21/13  1702   LDL  --   --   --   --  54  --  74 63   < >  --    HDL  --   --   --   --  48  --  46 52   < >  --    NHDL  --   --   --   --  71  --  90 77   < >  --    CHOL  --   --   --   --  119  --  136 129   < >  --    TRIG  --   --   --   --  87  --  82 71   < >  --    TSH  --   --   --   --   --   --   --   --   --  1.66   NTBNP 4,442*  --  3,042*  --   --   --   --   --   --   --    IRON  --  89  --    < >  --    < > 117  --    < > <10*   FEB  --  316  --    < >  --    < > 312  --    < > 219*   IRONSAT  --  28  --    < >  --    < > 38  --    < > <5*   RJ  --  607*  --    < >  --    < > 602*  --    < > 2,750*    < > = values in this interval not displayed.       Lab Results   Component Value Date    WBC 4.0 12/16/2020    RBC 4.30 (L) 12/16/2020    HGB 8.8 (L) 12/16/2020    HCT 28.2 (L) 12/16/2020    MCV 66 (L) 12/16/2020    MCH 20.5 (L) 12/16/2020    MCHC 31.2 (L) 12/16/2020    RDW 18.6 (H) 12/16/2020     (L) 12/16/2020       Lab Results   Component Value Date     12/21/2020    POTASSIUM 4.7 12/21/2020    CHLORIDE 108 12/21/2020    CO2 28 12/21/2020    ANIONGAP 5 12/21/2020    GLC 90 12/21/2020    BUN 43 (H) 12/21/2020    CR 1.61 (H) 12/21/2020    GFRESTIMATED 38 (L) 12/21/2020    GFRESTBLACK 44 (L) 12/21/2020    AYALA 9.2 12/21/2020      Lab Results   Component Value Date    AST 16 09/28/2020    ALT 19 09/28/2020       No results found for: A1C    Lab Results   Component Value Date    INR 1.21 (H) 07/13/2020    INR 1.25 (H) 06/03/2020           Problem List:     Patient Active Problem List   Diagnosis     Coronary atherosclerosis     Benign neoplasm of colon     Localized osteoarthrosis, lower leg     Allergic rhinitis     Moderate persistent asthma     Anemia      Osteoporosis     Hypertension goal BP (blood pressure) < 140/90     Hyperlipidemia with target LDL less than 100     CKD (chronic kidney disease) stage 3, GFR 30-59 ml/min     Nonrheumatic aortic valve stenosis     Advanced directives, counseling/discussion     Iron deficiency anemia     Vitamin B 12 deficiency     Health Care Home     Mild persistent asthma without complication     Acute respiratory failure (H)     Acute on chronic congestive heart failure, unspecified heart failure type (H)     Severe aortic stenosis     Coronary artery disease involving native coronary artery of native heart without angina pectoris     S/P CABG (coronary artery bypass graft)     Hyperkalemia     GI bleed     Pneumonia     Atrial fibrillation (H)     Thrombus of left atrial appendage     Complete heart block (H)     Aortic stenosis, severe     Acute exacerbation of CHF (congestive heart failure) (H)     Community acquired pneumonia     Acute congestive heart failure, unspecified heart failure type (H)     History of transcatheter aortic valve replacement (TAVR)           Medications:     Current Outpatient Medications   Medication Sig Dispense Refill     albuterol (PROAIR HFA/PROVENTIL HFA/VENTOLIN HFA) 108 (90 Base) MCG/ACT inhaler INHALE 1 TO 2 PUFFS BY MOUTH EVERY 4 TO 6 HOURS AS NEEDED 18 g 2     aspirin (ASA) 81 MG EC tablet Take 81 mg by mouth daily       budesonide (PULMICORT) 0.5 MG/2ML nebulizer solution Take 2 mLs (0.5 mg) by nebulization 2 times daily 120 mL 0     calcium carbonate 600 mg-vitamin D 400 units (CALTRATE) 600-400 MG-UNIT per tablet Take 1 tablet by mouth daily       dabigatran ANTICOAGULANT (PRADAXA) 150 MG capsule Take 1 capsule (150 mg) by mouth 2 times daily Store in original 's bottle or blister pack; use within 120 days of opening. 180 capsule 3     ferrous sulfate (IRON) 325 (65 Fe) MG tablet TAKE 1 TABLET(325 MG) BY MOUTH TWICE DAILY 180 tablet 0     hydrALAZINE (APRESOLINE) 25 MG  tablet Take 0.5 tablets (12.5 mg) by mouth 3 times daily 90 tablet 3     ipratropium - albuterol 0.5 mg/2.5 mg/3 mL (DUONEB) 0.5-2.5 (3) MG/3ML nebulization Inhale 1 vial (3 mLs) into the lungs 4 times daily 360 mL 11     isosorbide mononitrate (IMDUR) 30 MG 24 hr tablet Take 0.5 tablets (15 mg) by mouth daily 45 tablet 3     lisinopril (ZESTRIL) 20 MG tablet Take 1 tablet (20 mg) by mouth daily 90 tablet 1     magnesium chloride 535 (64 Mg) MG TBEC CR tablet Take 1 tablet (535 mg) by mouth two times daily 28 tablet 0     meclizine (ANTIVERT) 12.5 MG tablet Take 1 tablet (12.5 mg) by mouth 3 times daily as needed for dizziness 90 tablet 1     melatonin 3 MG tablet Take 1 tablet (3 mg) by mouth nightly as needed for sleep 30 tablet 0     metoprolol tartrate (LOPRESSOR) 25 MG tablet Take 1 tablet (25 mg) by mouth 2 times daily 180 tablet 0     montelukast (SINGULAIR) 10 MG tablet TAKE 1 TABLET(10 MG) BY MOUTH DAILY 90 tablet 1     multivitamin, therapeutic (THERA-VIT) TABS tablet Take 1 tablet by mouth every evening        nitroGLYcerin (NITROSTAT) 0.4 MG sublingual tablet For chest pain place 1 tablet under the tongue every 5 minutes for 3 doses. If symptoms persist 5 minutes after 1st dose call 911. 12 tablet 0     pantoprazole (PROTONIX) 40 MG EC tablet TAKE ONE TABLET BY MOUTH TWICE A DAY BEFORE MEALS 60 tablet 3     simvastatin (ZOCOR) 40 MG tablet TAKE 1 TABLET(40 MG) BY MOUTH AT BEDTIME 30 tablet 0     torsemide (DEMADEX) 10 MG tablet Take 1 tablet (10 mg) by mouth daily 90 tablet 3     torsemide (DEMADEX) 5 MG tablet Take 1 tablet (5 mg) by mouth daily as needed (weight gain of 2-3 pounds in one day)             Past Medical History:     Past Medical History:   Diagnosis Date     Allergic rhinitis, cause unspecified      Anemia, unspecified      Aortic stenosis     severe     Benign neoplasm of colon 1999    Ademonatous polyp     CKD (chronic kidney disease) stage 3, GFR 30-59 ml/min 05/12/2011     Coronary  atherosclerosis of unspecified type of vessel, native or graft     s/p CABG 2002     Hyperlipidemia LDL goal < 100      Hypertension goal BP (blood pressure) < 140/90      Localized osteoarthrosis not specified whether primary or secondary, lower leg     left knee     Moderate persistent asthma      Pacemaker      Persistent atrial fibrillation (H)      Unspecified hearing loss      Past Surgical History:   Procedure Laterality Date     CARDIAC SURGERY       COLONOSCOPY N/A 5/26/2020    Procedure: COLONOSCOPY;  Surgeon: Ignacio Fournier MD;  Location:  GI     CV ANGIOGRAM CORONARY GRAFT N/A 4/24/2020    Procedure: Angiogram Coronary Graft;  Surgeon: Addison Willard MD;  Location:  HEART CARDIAC CATH LAB     CV CORONARY ANGIOGRAM N/A 4/24/2020    Procedure: Coronary Angiogram;  Surgeon: Addison Willard MD;  Location:  HEART CARDIAC CATH LAB     CV RIGHT HEART CATH N/A 4/24/2020    Procedure: Right Heart Cath;  Surgeon: Addison Willard MD;  Location:  HEART CARDIAC CATH LAB     CV TRANSCATHETER AORTIC VALVE REPLACEMENT N/A 7/14/2020    Procedure: Transcatheter Aortic Valve Replacement;  Surgeon: Soraida Monet MD;  Location:  HEART CARDIAC CATH LAB     EP PACEMAKER N/A 7/15/2020    Procedure: EP Pacemaker;  Surgeon: Tommie Sauer MD;  Location:  HEART CARDIAC CATH LAB     HC COLONOSCOPY THRU STOMA W BIOPSY/CAUTERY TUMOR/POLYP/LESION  5/03    Dr. Dow, Q 5 years     HC COLONOSCOPY THRU STOMA W BIOPSY/CAUTERY TUMOR/POLYP/LESION  12/199    Dr. Dow, one adenomatous polyp     HC ESOPHAGOSCOPY, DIAGNOSTIC  5/03    Dr. Dow, lower esophageal ring & mild gastritis     HERNIORRHAPHY INGUINAL Right 9/26/2016    Procedure: HERNIORRHAPHY INGUINAL;  Surgeon: Alexis Alexandra MD;  Location: [facility]     LAPAROSCOPIC CHOLECYSTECTOMY  12/03    for biliary sludge     ZZC NONSPECIFIC PROCEDURE  5/02    Coronary artery bypass     Family History   Problem Relation Age of Onset      TRAVIS Father      Cancer Mother         breast cancer,  of pneumonia     Cerebrovascular Disease Son      CABG Son      Family History Negative Child      Family History Negative Sister      Heart Disease Brother      Social History     Socioeconomic History     Marital status:      Spouse name: Kailey     Number of children: 3     Years of education: Not on file     Highest education level: Not on file   Occupational History     Occupation: [employer]     Employer: RETIRED   Social Needs     Financial resource strain: Not on file     Food insecurity     Worry: Not on file     Inability: Not on file     Transportation needs     Medical: Not on file     Non-medical: Not on file   Tobacco Use     Smoking status: Former Smoker     Types: Cigarettes     Smokeless tobacco: Never Used     Tobacco comment: smoked for a week in 's   Substance and Sexual Activity     Alcohol use: Not Currently     Alcohol/week: 0.0 standard drinks     Drug use: No     Sexual activity: Yes     Partners: Female   Lifestyle     Physical activity     Days per week: 4 days     Minutes per session: 30 min     Stress: Not on file   Relationships     Social connections     Talks on phone: Not on file     Gets together: Not on file     Attends Judaism service: Not on file     Active member of club or organization: Not on file     Attends meetings of clubs or organizations: Not on file     Relationship status: Not on file     Intimate partner violence     Fear of current or ex partner: Not on file     Emotionally abused: Not on file     Physically abused: Not on file     Forced sexual activity: Not on file   Other Topics Concern      Service No     Blood Transfusions No     Caffeine Concern Not Asked     Occupational Exposure Not Asked     Hobby Hazards Not Asked     Sleep Concern Not Asked     Stress Concern Not Asked     Weight Concern Not Asked     Special Diet Not Asked     Back Care Not Asked     Exercise Yes     Bike  Helmet Not Asked     Seat Belt Yes     Self-Exams No     Parent/sibling w/ CABG, MI or angioplasty before 65F 55M? Yes   Social History Narrative    Balanced Diet - Yes    Osteoporosis Preventative measures-  Dairy servings per day: 2 to 3 servings daily    Regular Exercise -  Yes Describe walks 1.5 mile daily     Dental Exam up - YES - Date: 2 years ago    Eye Exam - YES - Date: 1 year ago    Self Testicular Exam -  No, handout given    Do you have any concerns about STD's -  No    Abuse: Current or Past (Physical, Sexual or Emotional)- No    Do you feel safe in your environment - Yes    Guns stored in the home - Yes, locked    Sunscreen used - No    Seatbelts used - Yes    Lipids - YES - Date: 3-09    Glucose -  YES - Date: 3-09    Colon Cancer Screening - Colonoscopy 3-08(date completed)    Hemoccults - UNKNOWN    PSA - YES - Date: 11-07    Digital Rectal Exam - UNKNOWN    Immunizations reviewed and up to date - Yes, td 1-2005, had shingles vaccine    5-28-09  JANEY Patel [facility]                           Allergies:   Patient has no known allergies.      Amee Rocha PA-C  [facility]  Pager: [phone]        Thank you for allowing me to participate in the care of your patient.    Sincerely,     Amee Rocha PA-C     [facility]

## 2020-12-30 NOTE — NURSING NOTE
[facility]    Met with Shiraz and  to review/enroll into MyHealth Tracker. We will create [employer] account for him and he will call in tomorrow(thurs 12/31). Reminder sent to board to watch for transmission.     Escorted patient to schedulers to arrange f/u appts:  Future Appointments   Date Time Provider Department Center   1/4/2021  8:50 AM Dany Rodriguez MD [facility]   1/19/2021 11:15 AM [facility]   1/19/2021 12:30 [facility]   3/1/2021 12:00 PM [facility]   3/1/2021 12:45 PM [facility]   3/25/2021 12:00 [facility]     AVS printed and reviewed w/patient including labs from today. I told him results had not yet been reviewed by Amee Rocha and we may need to make med adjustments based on the results. He acknowledged and said we can call his son. He has no other questions.  Erica Sam RN on 12/30/2020 at 9:59 AM

## 2021-01-04 ENCOUNTER — CARE COORDINATION (OUTPATIENT)
Dept: CARDIOLOGY | Facility: CLINIC | Age: 86
End: 2021-01-04

## 2021-01-04 ENCOUNTER — OFFICE VISIT (OUTPATIENT)
Dept: FAMILY MEDICINE | Facility: CLINIC | Age: 86
End: 2021-01-04
Payer: COMMERCIAL

## 2021-01-04 VITALS
RESPIRATION RATE: 16 BRPM | DIASTOLIC BLOOD PRESSURE: 58 MMHG | WEIGHT: 145.4 LBS | TEMPERATURE: 99 F | HEART RATE: 66 BPM | BODY MASS INDEX: 22.04 KG/M2 | HEIGHT: 68 IN | SYSTOLIC BLOOD PRESSURE: 124 MMHG | OXYGEN SATURATION: 95 %

## 2021-01-04 DIAGNOSIS — N18.32 STAGE 3B CHRONIC KIDNEY DISEASE (H): Primary | ICD-10-CM

## 2021-01-04 DIAGNOSIS — I50.33 ACUTE ON CHRONIC DIASTOLIC CONGESTIVE HEART FAILURE (H): ICD-10-CM

## 2021-01-04 LAB
ANION GAP SERPL CALCULATED.3IONS-SCNC: 4 MMOL/L (ref 3–14)
BUN SERPL-MCNC: 73 MG/DL (ref 7–30)
CALCIUM SERPL-MCNC: 9.4 MG/DL (ref 8.5–10.1)
CHLORIDE SERPL-SCNC: 103 MMOL/L (ref 94–109)
CO2 SERPL-SCNC: 25 MMOL/L (ref 20–32)
CREAT SERPL-MCNC: 2.22 MG/DL (ref 0.66–1.25)
GFR SERPL CREATININE-BSD FRML MDRD: 26 ML/MIN/{1.73_M2}
GLUCOSE SERPL-MCNC: 105 MG/DL (ref 70–99)
HGB BLD-MCNC: 9.6 G/DL (ref 13.3–17.7)
NT-PROBNP SERPL-MCNC: 3865 PG/ML (ref 0–450)
POTASSIUM SERPL-SCNC: 5.6 MMOL/L (ref 3.4–5.3)
SODIUM SERPL-SCNC: 132 MMOL/L (ref 133–144)

## 2021-01-04 PROCEDURE — 80048 BASIC METABOLIC PNL TOTAL CA: CPT | Performed by: PHYSICIAN ASSISTANT

## 2021-01-04 PROCEDURE — 83880 ASSAY OF NATRIURETIC PEPTIDE: CPT | Performed by: PHYSICIAN ASSISTANT

## 2021-01-04 PROCEDURE — 99214 OFFICE O/P EST MOD 30 MIN: CPT | Performed by: FAMILY MEDICINE

## 2021-01-04 PROCEDURE — T1013 SIGN LANG/ORAL INTERPRETER: HCPCS | Mod: U3

## 2021-01-04 PROCEDURE — 85018 HEMOGLOBIN: CPT | Performed by: PHYSICIAN ASSISTANT

## 2021-01-04 PROCEDURE — 36415 COLL VENOUS BLD VENIPUNCTURE: CPT | Performed by: PHYSICIAN ASSISTANT

## 2021-01-04 ASSESSMENT — MIFFLIN-ST. JEOR: SCORE: 1314.03

## 2021-01-04 NOTE — PROGRESS NOTES
[facility]  Received VM from pt via Interpret from 1/2 report weight of 134#. No other information was left. Of note, pt seen in clinic 12/30 by ALICJA Landis. Pt was enrolled in My Health Tracker, and pt was to start calling 12/31. Does not look like pt has started calling in his weights. Weight goal was set at 138-143#. Weight at home on 12/30 was 136#. Pt had worsening renal function, thus Torsemide decreased to 5mg daily and Lisinopril also decreased to 10mg daily. Pt is to have repeat BMP today at PCP office. Labs were drawn this am, results still in process.     Called son Willi, he states he knows pt is writing down weights every day, but unsure pt is aware about calling them in. He does not have capability of being able to call in weights himself as he would need ASL interpretor every day. Willi works, so he doesn't feel he could call in by noon either. Willi states he would talk to his brother and his girlfriend tonight to see if maybe they could call in the survey each day and get back to me later today or tomorrow about if they will be able to call weights to us. I also need updated weight today.     FYI to ALICJA Landis.     Rhoda Hager RN, BSN, CHFN  01/04/21 at 12:26 PM

## 2021-01-04 NOTE — PATIENT INSTRUCTIONS
COVID vaccine FAQ:  https://bit.ly/73v1xc5      Did you know?      You can schedule a video visit for follow-up appointments as well as future appointments for certain conditions.  Please see the below link.     [email or web address]    If you have not already done so,  I encourage you to sign up for [email or web address]).  This will allow you to review your results, securely communicate with a provider, and schedule virtual visits as well.

## 2021-01-04 NOTE — PROGRESS NOTES
Assessment & Plan     Acute on chronic diastolic congestive heart failure (H)  Reviewed cardiology notes.  He has had multiple hospitalization for the same reason and persistent follow-up with core clinic would be helpful.  He understood.  - Basic metabolic panel  - N terminal pro BNP outpatient  - Hemoglobin    Stage 3b chronic kidney disease  His renal function was significantly worsened and his torsemide and lisinopril dose were decreased.  He is here for recheck.                           Follow-up with me after seen by cardiology in March.  Sooner with questions.    Dany Rodriguez MD, MD  [facility]     Shiraz Ingram is a 86 year old male who presents to clinic today for the following health issues     HPI         Hospital Follow-up Visit:    Hospital/Nursing Home/IP Rehab Facility: [facility]  Date of Admission: 12/15/2020  Date of Discharge: 12/16/2020  Reason(s) for Admission: Acute on chronic HFpEF, suspected  H/o TAVR 7/2020 complicated by CHB necessitating ppm placement  CAD s/p CABG 2002  HTN with moderate to severe LVH  Pulmonary hypertension - no evidence of ESHA on previous CT scans  Recovered COVID      Was your hospitalization related to COVID-19? No   Problems taking medications regularly:  None  Medication changes since discharge: torsemide change to 5mg and start taking hydralazine and isosorbide and magnesium  Problems adhering to non-medication therapy:  None    Summary of hospitalization:  [facility] discharge summary reviewed  Diagnostic Tests/Treatments reviewed.  Follow up needed: labs  Other Healthcare Providers Involved in Patient s Care:         cardiology  Update since discharge: stable.       Post Discharge Medication Reconciliation: discharge medications reconciled, continue medications without change.  Plan of care communicated with patient              Here with .     Doing OK.  "  Been to cardiology clinic. Goal weight 137 to 144  Will start core clinic this month.     Lisinopril - 10mg and torsemide 5mg per day. Renal function was getting worse and both doses were decreased.   Torsemide goes to bathroom frequently but does not feel dizzy or unsteady.      Needs repeat tests.           Objective    /58 (BP Location: Left arm, Patient Position: Sitting, Cuff Size: Adult Regular)   Pulse 66   Temp 99  F (37.2  C) (Oral)   Resp 16   Ht 1.727 m (5' 8\")   Wt 66 kg (145 lb 6.4 oz)   SpO2 95%   BMI 22.11 kg/m    Body mass index is 22.11 kg/m .  Physical Exam   GENERAL: healthy, alert and no distress  No leg swelling  PSYCH: mentation appears normal, affect normal/bright, speaking in sign language.                   "

## 2021-01-05 NOTE — PROGRESS NOTES
[facility]   Amee Rocha PA-C   1/5/2021  7:10 AM CST      Please have Shiraz stop lisinopril 10 and hold torsemide 5 x 1 day, then re-start torsemide at 5 mg daily, follow up with provider Monday next week + repeat labs prior. Thanks.       Called ariana Márquez, no answer. Left detailed message with lab results and instructions:     1. STOP Lisinopril  2. HOLD Torsemide x 1 day, then restart at 5mg daily  3. Follow up as scheduled on 1/19 with labs prior    Asked son to call back with confirmation of instructions as well as weight update today.     Rhoda Hager, RN, BSN, CHFN  01/05/21 at 11:03 AM   HPI      ROS      Physical Exam

## 2021-01-06 NOTE — PROGRESS NOTES
[facility]  Received VM from son Willi last night that he received VM. Pt's weight yesterday was 130#. And he confirmed med changes and will give instructions to pt.     Of note, Pt's last weight we have recorded is from 1/2 as 134# and 12/30 was 136#. Goal weight per ALICJA Landis was to be 138-143#. Torsemide was decreased from 10mg to 5mg dailt on 12/30. And now being held today, and then to resume at 5mg daily tomorrow. Will send FYI about weight loss to ALICJA Landis.     Pt has CORE DIAZ follow up 1/19 with labs.     Rhoda Hager RN, BSN, CHFN  01/06/21 at 9:39 AM

## 2021-01-06 NOTE — PROGRESS NOTES
"[facility]    Erica Sam RN Ortman, Elinor E, RN             \"someone\" - did not leave name/number - called in Shiraz's eight today at 138#. Yesterday was 130#. Did not say anything about sx. Since this is a Amee Rocha patient, do you address this??     Called and spoke with Willi. He was only able to talk briefly as he is currently at work. When asked about the weight he stated, \"Oh I wouldn't know about that, I haven't seen him today\". Willi reports that generally speaking, Shiraz is feeling well. I told Willi that 8# weight gain overnight does not seem accurate, and he agreed. In fact, his weights seem all over the place, especially considering his lack of HF symptoms following his hospital discharge.  It is not clear to me who is taking weights or if they are true dry weights. I was unable to address these things with Willi since he was busy at work.      Of note, Shiraz is holding torsemide today, restarting at 5 mg daily tomorrow.     Advised Willi that since we have yet to receive a successful MHT transmission, we will disenroll. Willi was in agreement with this. He will take a look at Shiraz when he gets home and call me if he seems short of breath or swollen today, or if other concerns prior to follow-up.     FYI to Amee Rocha PA-C and to Taylor Daniels PA-C, who will be seeing Shiraz for follow-up 1/19/20.    MARY Grande, RN, PHN, HNB-BC   1/6/2021 at 11:48 AM             "

## 2021-01-09 ENCOUNTER — HEALTH MAINTENANCE LETTER (OUTPATIENT)
Age: 86
End: 2021-01-09

## 2021-01-19 ENCOUNTER — OFFICE VISIT (OUTPATIENT)
Dept: CARDIOLOGY | Facility: CLINIC | Age: 86
End: 2021-01-19
Attending: PHYSICIAN ASSISTANT
Payer: COMMERCIAL

## 2021-01-19 VITALS
HEIGHT: 68 IN | HEART RATE: 77 BPM | DIASTOLIC BLOOD PRESSURE: 61 MMHG | BODY MASS INDEX: 21.22 KG/M2 | WEIGHT: 140 LBS | SYSTOLIC BLOOD PRESSURE: 138 MMHG | OXYGEN SATURATION: 99 %

## 2021-01-19 DIAGNOSIS — I50.31 ACUTE DIASTOLIC HEART FAILURE (H): Primary | ICD-10-CM

## 2021-01-19 DIAGNOSIS — I50.31 ACUTE DIASTOLIC HEART FAILURE (H): ICD-10-CM

## 2021-01-19 DIAGNOSIS — I35.0 SEVERE AORTIC STENOSIS: ICD-10-CM

## 2021-01-19 DIAGNOSIS — I50.9 ACUTE ON CHRONIC CONGESTIVE HEART FAILURE, UNSPECIFIED HEART FAILURE TYPE (H): ICD-10-CM

## 2021-01-19 LAB
ANION GAP SERPL CALCULATED.3IONS-SCNC: 8 MMOL/L (ref 3–14)
BUN SERPL-MCNC: 48 MG/DL (ref 7–30)
CALCIUM SERPL-MCNC: 9.9 MG/DL (ref 8.5–10.1)
CHLORIDE SERPL-SCNC: 105 MMOL/L (ref 94–109)
CO2 SERPL-SCNC: 23 MMOL/L (ref 20–32)
CREAT SERPL-MCNC: 1.66 MG/DL (ref 0.66–1.25)
GFR SERPL CREATININE-BSD FRML MDRD: 37 ML/MIN/{1.73_M2}
GLUCOSE SERPL-MCNC: 99 MG/DL (ref 70–99)
HGB BLD-MCNC: 9.9 G/DL (ref 13.3–17.7)
POTASSIUM SERPL-SCNC: 5.4 MMOL/L (ref 3.4–5.3)
SODIUM SERPL-SCNC: 136 MMOL/L (ref 133–144)

## 2021-01-19 PROCEDURE — T1013 SIGN LANG/ORAL INTERPRETER: HCPCS | Mod: U3 | Performed by: INTERNAL MEDICINE

## 2021-01-19 PROCEDURE — 36415 COLL VENOUS BLD VENIPUNCTURE: CPT | Performed by: PHYSICIAN ASSISTANT

## 2021-01-19 PROCEDURE — 85018 HEMOGLOBIN: CPT | Performed by: PHYSICIAN ASSISTANT

## 2021-01-19 PROCEDURE — 99214 OFFICE O/P EST MOD 30 MIN: CPT | Performed by: PHYSICIAN ASSISTANT

## 2021-01-19 PROCEDURE — 80048 BASIC METABOLIC PNL TOTAL CA: CPT | Performed by: PHYSICIAN ASSISTANT

## 2021-01-19 RX ORDER — ISOSORBIDE MONONITRATE 30 MG/1
30 TABLET, EXTENDED RELEASE ORAL DAILY
Qty: 90 TABLET | Refills: 3 | Status: SHIPPED | OUTPATIENT
Start: 2021-01-19 | End: 2021-02-12

## 2021-01-19 ASSESSMENT — MIFFLIN-ST. JEOR: SCORE: 1289.54

## 2021-01-19 NOTE — PROGRESS NOTES
Service Date: 2021      CLINIC VISIT      PRIMARY CARDIOLOGIST:  Dr. Field       REASON FOR VISIT:  C.O.R.E. Clinic followup.      HISTORY OF PRESENT ILLNESS:  Mr. Ingram is a delightful 86-year-old gentleman who is seen today with the assistance of a professional .  His past medical history is significant for the followin.  Severe aortic stenosis status post TAVR 2020 with a 29 mm Medtronic Evolut Pro Plus valve.   2.  Complete heart block post TAVR with permanent pacemaker implanted.   3.  Coronary artery disease with history of CABG in .  This was a LIMA to LAD and saphenous vein graft to the OM as well as saphenous vein graft to the right PDA and the right PL and a Y graft.  He has a lesion of the right PDA at the touchdown of the graft.   4.  Heart failure with preserved EF with EF 50%-55%.   5.  Mild perivalvular leak around his aortic valve noted on subsequent echos.   6.  CKD.   7.  Hypertension.   8.  History of GI bleed.   9.  Asthma.   10.  Chronic AFib, on Coumadin, with history of left atrial appendage thrombus.      Unfortunately, Mr. Ingram has had a difficult year since his TAVR.  He had admissions for volume overload, dehydration and pneumonia.  His medications have been adjusted up and down multiple times due to these issues.  Most recently, he had had worsening renal function with creatinine up to 2.2 where his baseline is around 1.4 to 1.5 and his lisinopril was discontinued and his torsemide was cut back to 5 mg a day.      He comes in today for followup of those events.  He is overall feeling well.  His telltale has been peripheral edema as well as shortness of breath and he has had none of these.  His legs are skinny.  He denies chest pain, shortness of breath, orthopnea or PND.  His weights at home have been between 135 and 140 pounds,.  He urinates 3-4 times a day and this does not seem excessive.  He overall feels like he is doing well.  Home blood pressures are in  the 110s to 130s, more frequently in the 110s to 120s.  He has been using NoSalt on his food fairly regularly, as he has tried to avoid sodium.      SOCIAL HISTORY:  He is a reupholsterer.  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  He again is trying to watch his salt.  He is a former smoker, extremely remote, in his 20s.  No alcohol use.      PHYSICAL EXAMINATION:   GENERAL:  Well-developed, well-nourished gentleman in no acute distress, seen today with the assistance of a professional .   HEENT:  He is normocephalic, atraumatic.   HEART:  Regular in rate and rhythm.  I do not appreciate murmur, rub or gallop.   LUNGS:  Clear without wheezes, rales or rhonchi.   NECK:  Veins are flat at 30 degrees.   SKIN:  Warm and dry.   EXTREMITIES:  There is no peripheral edema.      ASSESSMENT AND PLAN:   1.  Heart failure with preserved EF.  Appears euvolemic today with his home dry weight to be between 136-141 pounds.  His creatinine has improved, although it is not quite back to baseline.  It is trending in that direction.  At this point, I think we need to keep him off lisinopril and we will keep him on Imdur and hydralazine as afterload reduction instead.  I will increase his Imdur to 30 mg daily for ease of dosing and improve his blood pressures slightly that are borderline here.  He will continue on 5 mg of torsemide and currently has class II symptoms.  He did try to do the My Health Tracker but it did not work for him.  At this point, we agreed that he will call us for a weight of 141 and we can discuss if he has any symptoms to make adjustments.  I do note that he has a small perivalvular leak around his aortic valve and that is something we need to keep in mind going forward if he has worsening heart failure symptoms, if that needs to be reviewed with a ALINE and may be contributing to the  problem.     2.  Status post aortic valve replacement with a Medtronic valve as above.  Small perivalvular leak, likely not contributing at this time but if we cannot get a handle on things, we will reassess.  No aortic stenosis noted.   3.  CKD, improving.   4.  Hypertension, well controlled.   5.  Anemia with history of GI bleed in May.  This is stable at 9.9.  It did normalize or near-normalize up to 11.5 in July.  We will continue to follow with a repeat hemoglobin.  We may need to consider iron studies again and possible iron infusion.      Thank you for allowing me to participate in this delightful patient's care.  We will see him back in about 3 weeks with a BMP and hemoglobin prior, sooner with concerns.      BON Maloney PA-C             D: 2021   T: 2021   MT: DANI      Name:     SRIKANTH OBANDO   MRN:      [id number]        Account:      [id number]   :      1934           Service Date: 2021      Document: [id number]

## 2021-01-19 NOTE — PATIENT INSTRUCTIONS
"Call [facility] nurse for any questions or concerns Mon-Fri 8am-4pm:                                                #[phone]                                       For concerns after hours:                                               #[phone]     1: Medication changes: increase your Imdur/ isosorbide mononitrate to 1 full pill once a day. This will be 30 mg once a day in the morning.    Continue other medications.  Stay off of lisinopril.      Please do not use the \"No Salt\" product until your potassium levels improve.      2: Plan from today:   Please see Amee Rocha PA-C in about 3 week with labs at that visit.      3: Lab results: labs are better this time!  Component      Latest Ref Rng & Units 1/19/2021   Sodium      133 - 144 mmol/L 136   Potassium      3.4 - 5.3 mmol/L 5.4 (H)   Chloride      94 - 109 mmol/L 105   Carbon Dioxide      20 - 32 mmol/L 23   Anion Gap      3 - 14 mmol/L 8   Glucose      70 - 99 mg/dL 99   Urea Nitrogen      7 - 30 mg/dL 48 (H)   Creatinine      0.66 - 1.25 mg/dL 1.66 (H)   GFR Estimate      >60 mL/min/1.73:m2 37 (L)   GFR Estimate If Black      >60 mL/min/1.73:m2 43 (L)   Calcium      8.5 - 10.1 mg/dL 9.9   Hemoglobin      13.3 - 17.7 g/dL 9.9 (L)     "

## 2021-01-19 NOTE — PROGRESS NOTES
[id number]  HPI and Plan:   See dictation    Orders this Visit:  Orders Placed This Encounter   Procedures     Basic metabolic panel     Hemoglobin     Follow-Up with [facility] - DIAZ visit     Orders Placed This Encounter   Medications     isosorbide mononitrate (IMDUR) 30 MG 24 hr tablet     Sig: Take 1 tablet (30 mg) by mouth daily     Dispense:  90 tablet     Refill:  3     Medications Discontinued During This Encounter   Medication Reason     isosorbide mononitrate (IMDUR) 30 MG 24 hr tablet          Encounter Diagnoses   Name Primary?     Acute diastolic heart failure (H) Yes     Acute on chronic congestive heart failure, unspecified heart failure type (H)      Severe aortic stenosis        CURRENT MEDICATIONS:  Current Outpatient Medications   Medication Sig Dispense Refill     albuterol (PROAIR HFA/PROVENTIL HFA/VENTOLIN HFA) 108 (90 Base) MCG/ACT inhaler INHALE 1 TO 2 PUFFS BY MOUTH EVERY 4 TO 6 HOURS AS NEEDED 18 g 2     aspirin (ASA) 81 MG EC tablet Take 81 mg by mouth daily       budesonide (PULMICORT) 0.5 MG/2ML nebulizer solution Take 2 mLs (0.5 mg) by nebulization 2 times daily 120 mL 0     calcium carbonate 600 mg-vitamin D 400 units (CALTRATE) 600-400 MG-UNIT per tablet Take 1 tablet by mouth daily       dabigatran ANTICOAGULANT (PRADAXA) 150 MG capsule Take 1 capsule (150 mg) by mouth 2 times daily Store in original 's bottle or blister pack; use within 120 days of opening. 180 capsule 3     ferrous sulfate (IRON) 325 (65 Fe) MG tablet TAKE 1 TABLET(325 MG) BY MOUTH TWICE DAILY 180 tablet 0     hydrALAZINE (APRESOLINE) 25 MG tablet Take 0.5 tablets (12.5 mg) by mouth 3 times daily 90 tablet 3     ipratropium - albuterol 0.5 mg/2.5 mg/3 mL (DUONEB) 0.5-2.5 (3) MG/3ML nebulization Inhale 1 vial (3 mLs) into the lungs 4 times daily 360 mL 11     isosorbide mononitrate (IMDUR) 30 MG 24 hr tablet Take 1 tablet (30 mg) by mouth daily 90 tablet 3     magnesium chloride (MAG64) 535 (64 Mg) MG TBEC  CR tablet Take 1 tablet (535 mg) by mouth 2 times daily 60 tablet 3     magnesium chloride 535 (64 Mg) MG TBEC CR tablet Take 1 tablet (535 mg) by mouth two times daily 28 tablet 0     meclizine (ANTIVERT) 12.5 MG tablet Take 1 tablet (12.5 mg) by mouth 3 times daily as needed for dizziness 90 tablet 1     melatonin 3 MG tablet Take 1 tablet (3 mg) by mouth nightly as needed for sleep 30 tablet 0     metoprolol tartrate (LOPRESSOR) 25 MG tablet Take 1 tablet (25 mg) by mouth 2 times daily 180 tablet 0     montelukast (SINGULAIR) 10 MG tablet Take 1 tablet (10 mg) by mouth daily 90 tablet 0     multivitamin, therapeutic (THERA-VIT) TABS tablet Take 1 tablet by mouth every evening        nitroGLYcerin (NITROSTAT) 0.4 MG sublingual tablet For chest pain place 1 tablet under the tongue every 5 minutes for 3 doses. If symptoms persist 5 minutes after 1st dose call 911. 12 tablet 0     pantoprazole (PROTONIX) 40 MG EC tablet TAKE ONE TABLET BY MOUTH TWICE A DAY BEFORE MEALS 180 tablet 0     simvastatin (ZOCOR) 40 MG tablet TAKE 1 TABLET(40 MG) BY MOUTH AT BEDTIME 30 tablet 0     torsemide (DEMADEX) 10 MG tablet Take 5mg (0.5mg) tablet daily 90 tablet 3     torsemide (DEMADEX) 5 MG tablet Take 1 tablet (5 mg) by mouth daily as needed (weight gain of 2-3 pounds in one day)         ALLERGIES   No Known Allergies    PAST MEDICAL HISTORY:  Past Medical History:   Diagnosis Date     Allergic rhinitis, cause unspecified      Anemia, unspecified      Aortic stenosis     severe     Benign neoplasm of colon 1999    Ademonatous polyp     CKD (chronic kidney disease) stage 3, GFR 30-59 ml/min 05/12/2011     Coronary atherosclerosis of unspecified type of vessel, native or graft     s/p CABG 2002     Hyperlipidemia LDL goal < 100      Hypertension goal BP (blood pressure) < 140/90      Localized osteoarthrosis not specified whether primary or secondary, lower leg     left knee     Moderate persistent asthma      Pacemaker       Persistent atrial fibrillation (H)      Unspecified hearing loss        PAST SURGICAL HISTORY:  Past Surgical History:   Procedure Laterality Date     CARDIAC SURGERY       COLONOSCOPY N/A 2020    Procedure: COLONOSCOPY;  Surgeon: Ignacio Fournier MD;  Location:  GI     CV ANGIOGRAM CORONARY GRAFT N/A 2020    Procedure: Angiogram Coronary Graft;  Surgeon: Addison Willard MD;  Location:  [facility]     CV CORONARY ANGIOGRAM N/A 2020    Procedure: Coronary Angiogram;  Surgeon: Addison Willard MD;  Location:  [facility]     CV RIGHT HEART CATH N/A 2020    Procedure: Right Heart Cath;  Surgeon: Addison Willard MD;  Location:  [facility]     CV TRANSCATHETER AORTIC VALVE REPLACEMENT N/A 2020    Procedure: Transcatheter Aortic Valve Replacement;  Surgeon: Soraida Monet MD;  Location:  HE[facility] LAB     EP PACEMAKER N/A 7/15/2020    Procedure: EP Pacemaker;  Surgeon: Tommie Sauer MD;  Location:  HEART [facility]ATH LAB     HC COLONOSCOPY THRU STOMA W BIOPSY/CAUTERY TUMOR/POLYP/LESION      Dr. Dow, Q 5 years     HC COLONOSCOPY THRU STOMA W BIOPSY/CAUTERY TUMOR/POLYP/LESION      Dr. Dow, one adenomatous polyp     HC ESOPHAGOSCOPY, DIAGNOSTIC      Dr. Dow, lower esophageal ring & mild gastritis     HERNIORRHAPHY INGUINAL Right 2016    Procedure: HERNIORRHAPHY INGUINAL;  Surgeon: Alexis Alexandra MD;  Location: [facility]     LAPAROSCOPIC CHOLECYSTECTOMY      for biliary sludge     [facility] NONSPECIFIC PROCEDURE      Coronary artery bypass       FAMILY HISTORY:  Family History   Problem Relation Age of Onset     C.A.D. Father      Cancer Mother         breast cancer,  of pneumonia     Cerebrovascular Disease Son      CABG Son      Family History Negative Child      Family History Negative Sister      Heart Disease Brother        SOCIAL HISTORY:  Social History      Socioeconomic History     Marital status:      Spouse name: Kailey     Number of children: 3     Years of education: None     Highest education level: None   Occupational History     Occupation: [employer]     Employer: RETIRED   Social Needs     Financial resource strain: None     Food insecurity     Worry: None     Inability: None     Transportation needs     Medical: None     Non-medical: None   Tobacco Use     Smoking status: Former Smoker     Types: Cigarettes     Smokeless tobacco: Never Used     Tobacco comment: smoked for a week in 20's   Substance and Sexual Activity     Alcohol use: Not Currently     Alcohol/week: 0.0 standard drinks     Drug use: No     Sexual activity: Yes     Partners: Female   Lifestyle     Physical activity     Days per week: 4 days     Minutes per session: 30 min     Stress: None   Relationships     Social connections     Talks on phone: None     Gets together: None     Attends Jehovah's witness service: None     Active member of club or organization: None     Attends meetings of clubs or organizations: None     Relationship status: None     Intimate partner violence     Fear of current or ex partner: None     Emotionally abused: None     Physically abused: None     Forced sexual activity: None   Other Topics Concern      Service No     Blood Transfusions No     Caffeine Concern Not Asked     Occupational Exposure Not Asked     Hobby Hazards Not Asked     Sleep Concern Not Asked     Stress Concern Not Asked     Weight Concern Not Asked     Special Diet Not Asked     Back Care Not Asked     Exercise Yes     Bike Helmet Not Asked     Seat Belt Yes     Self-Exams No     Parent/sibling w/ CABG, MI or angioplasty before 65F 55M? Yes   Social History Narrative    Balanced Diet - Yes    Osteoporosis Preventative measures-  Dairy servings per day: 2 to 3 servings daily    Regular Exercise -  Yes Describe walks 1.5 mile daily     Dental Exam up - YES - Date: 2 years ago    Eye Exam  "- YES - Date: 1 year ago    Self Testicular Exam -  No, handout given    Do you have any concerns about STD's -  No    Abuse: Current or Past (Physical, Sexual or Emotional)- No    Do you feel safe in your environment - Yes    Guns stored in the home - Yes, locked    Sunscreen used - No    Seatbelts used - Yes    Lipids - YES - Date: 3-09    Glucose -  YES - Date: 3-09    Colon Cancer Screening - Colonoscopy 3-08(date completed)    Hemoccults - UNKNOWN    PSA - YES - Date: 11-07    Digital Rectal Exam - UNKNOWN    Immunizations reviewed and up to date - Yes, td 1-2005, had shingles vaccine    5-28-09  JANEY Patel CMA                       Review of Systems:  Skin:  Negative     Eyes:  Positive for glasses  ENT:  Positive for deafness  Respiratory:  Negative    Cardiovascular:  Negative    Gastroenterology: Negative    Genitourinary:  not assessed    Musculoskeletal:  Negative    Neurologic:  Negative    Psychiatric:  Negative    Heme/Lymph/Imm:  Negative    Endocrine:  Negative      Physical Exam:  Vitals: /61   Pulse 77   Ht 1.727 m (5' 8\")   Wt 63.5 kg (140 lb)   SpO2 99%   BMI 21.29 kg/m     Please refer to dictation for physical exam    Recent Lab Results:  LIPID RESULTS:  Lab Results   Component Value Date    CHOL 119 06/01/2020    HDL 48 06/01/2020    LDL 54 06/01/2020    TRIG 87 06/01/2020    CHOLHDLRATIO 3.0 07/07/2015       LIVER ENZYME RESULTS:  Lab Results   Component Value Date    AST 16 09/28/2020    ALT 19 09/28/2020       CBC RESULTS:  Lab Results   Component Value Date    WBC 4.0 12/16/2020    RBC 4.30 (L) 12/16/2020    HGB 9.9 (L) 01/19/2021    HCT 28.2 (L) 12/16/2020    MCV 66 (L) 12/16/2020    MCH 20.5 (L) 12/16/2020    MCHC 31.2 (L) 12/16/2020    RDW 18.6 (H) 12/16/2020     (L) 12/16/2020       BMP RESULTS:  Lab Results   Component Value Date     01/19/2021    POTASSIUM 5.4 (H) 01/19/2021    CHLORIDE 105 01/19/2021    CO2 23 01/19/2021    ANIONGAP 8 01/19/2021    GLC 99 " 01/19/2021    BUN 48 (H) 01/19/2021    CR 1.66 (H) 01/19/2021    GFRESTIMATED 37 (L) 01/19/2021    GFRESTBLACK 43 (L) 01/19/2021    AYALA 9.9 01/19/2021        A1C RESULTS:  No results found for: A1C    INR RESULTS:  Lab Results   Component Value Date    INR 1.21 (H) 07/13/2020    INR 1.25 (H) 06/03/2020           CC  Amee Rocha PA-C  [id number]  [place] [id number]

## 2021-01-19 NOTE — LETTER
1/19/2021    Dany Rodriguez MD, MD  [address] [place] [id number]    RE: Shiraz Ingram       Dear Colleague,    I had the pleasure of seeing Shiraz MARIELLE Ingram in the [facility].    [id number]  HPI and Plan:   See dictation    Orders this Visit:  Orders Placed This Encounter   Procedures     Basic metabolic panel     Hemoglobin     Follow-Up with [facility] - DIAZ visit     Orders Placed This Encounter   Medications     isosorbide mononitrate (IMDUR) 30 MG 24 hr tablet     Sig: Take 1 tablet (30 mg) by mouth daily     Dispense:  90 tablet     Refill:  3     Medications Discontinued During This Encounter   Medication Reason     isosorbide mononitrate (IMDUR) 30 MG 24 hr tablet          Encounter Diagnoses   Name Primary?     Acute diastolic heart failure (H) Yes     Acute on chronic congestive heart failure, unspecified heart failure type (H)      Severe aortic stenosis        CURRENT MEDICATIONS:  Current Outpatient Medications   Medication Sig Dispense Refill     albuterol (PROAIR HFA/PROVENTIL HFA/VENTOLIN HFA) 108 (90 Base) MCG/ACT inhaler INHALE 1 TO 2 PUFFS BY MOUTH EVERY 4 TO 6 HOURS AS NEEDED 18 g 2     aspirin (ASA) 81 MG EC tablet Take 81 mg by mouth daily       budesonide (PULMICORT) 0.5 MG/2ML nebulizer solution Take 2 mLs (0.5 mg) by nebulization 2 times daily 120 mL 0     calcium carbonate 600 mg-vitamin D 400 units (CALTRATE) 600-400 MG-UNIT per tablet Take 1 tablet by mouth daily       dabigatran ANTICOAGULANT (PRADAXA) 150 MG capsule Take 1 capsule (150 mg) by mouth 2 times daily Store in original 's bottle or blister pack; use within 120 days of opening. 180 capsule 3     ferrous sulfate (IRON) 325 (65 Fe) MG tablet TAKE 1 TABLET(325 MG) BY MOUTH TWICE DAILY 180 tablet 0     hydrALAZINE (APRESOLINE) 25 MG tablet Take 0.5 tablets (12.5 mg) by mouth 3 times daily 90 tablet 3     ipratropium - albuterol 0.5 mg/2.5 mg/3 mL (DUONEB) 0.5-2.5 (3) MG/3ML  nebulization Inhale 1 vial (3 mLs) into the lungs 4 times daily 360 mL 11     isosorbide mononitrate (IMDUR) 30 MG 24 hr tablet Take 1 tablet (30 mg) by mouth daily 90 tablet 3     magnesium chloride (MAG64) 535 (64 Mg) MG TBEC CR tablet Take 1 tablet (535 mg) by mouth 2 times daily 60 tablet 3     magnesium chloride 535 (64 Mg) MG TBEC CR tablet Take 1 tablet (535 mg) by mouth two times daily 28 tablet 0     meclizine (ANTIVERT) 12.5 MG tablet Take 1 tablet (12.5 mg) by mouth 3 times daily as needed for dizziness 90 tablet 1     melatonin 3 MG tablet Take 1 tablet (3 mg) by mouth nightly as needed for sleep 30 tablet 0     metoprolol tartrate (LOPRESSOR) 25 MG tablet Take 1 tablet (25 mg) by mouth 2 times daily 180 tablet 0     montelukast (SINGULAIR) 10 MG tablet Take 1 tablet (10 mg) by mouth daily 90 tablet 0     multivitamin, therapeutic (THERA-VIT) TABS tablet Take 1 tablet by mouth every evening        nitroGLYcerin (NITROSTAT) 0.4 MG sublingual tablet For chest pain place 1 tablet under the tongue every 5 minutes for 3 doses. If symptoms persist 5 minutes after 1st dose call 911. 12 tablet 0     pantoprazole (PROTONIX) 40 MG EC tablet TAKE ONE TABLET BY MOUTH TWICE A DAY BEFORE MEALS 180 tablet 0     simvastatin (ZOCOR) 40 MG tablet TAKE 1 TABLET(40 MG) BY MOUTH AT BEDTIME 30 tablet 0     torsemide (DEMADEX) 10 MG tablet Take 5mg (0.5mg) tablet daily 90 tablet 3     torsemide (DEMADEX) 5 MG tablet Take 1 tablet (5 mg) by mouth daily as needed (weight gain of 2-3 pounds in one day)         ALLERGIES   No Known Allergies    PAST MEDICAL HISTORY:  Past Medical History:   Diagnosis Date     Allergic rhinitis, cause unspecified      Anemia, unspecified      Aortic stenosis     severe     Benign neoplasm of colon 1999    Ademonatous polyp     CKD (chronic kidney disease) stage 3, GFR 30-59 ml/min 05/12/2011     Coronary atherosclerosis of unspecified type of vessel, native or graft     s/p CABG 2002      Hyperlipidemia LDL goal < 100      Hypertension goal BP (blood pressure) < 140/90      Localized osteoarthrosis not specified whether primary or secondary, lower leg     left knee     Moderate persistent asthma      Pacemaker      Persistent atrial fibrillation (H)      Unspecified hearing loss        PAST SURGICAL HISTORY:  Past Surgical History:   Procedure Laterality Date     CARDIAC SURGERY       COLONOSCOPY N/A 5/26/2020    Procedure: COLONOSCOPY;  Surgeon: Ignacio Fournier MD;  Location:  GI     CV ANGIOGRAM CORONARY GRAFT N/A 4/24/2020    Procedure: Angiogram Coronary Graft;  Surgeon: Addison Willard MD;  Location:  HEART CARDIAC CATH LAB     CV CORONARY ANGIOGRAM N/A 4/24/2020    Procedure: Coronary Angiogram;  Surgeon: Addison Willard MD;  Location:  HEART CARDIAC CATH LAB     CV RIGHT HEART CATH N/A 4/24/2020    Procedure: Right Heart Cath;  Surgeon: Addison Willard MD;  Location:  HEART CARDIAC CATH LAB     CV TRANSCATHETER AORTIC VALVE REPLACEMENT N/A 7/14/2020    Procedure: Transcatheter Aortic Valve Replacement;  Surgeon: Soraida Monet MD;  Location:  HEART CARDIAC CATH LAB     EP PACEMAKER N/A 7/15/2020    Procedure: EP Pacemaker;  Surgeon: Tommie Sauer MD;  Location:  HEART CARDIAC CATH LAB     HC COLONOSCOPY THRU STOMA W BIOPSY/CAUTERY TUMOR/POLYP/LESION  5/03    Dr. Dow, Q 5 years     HC COLONOSCOPY THRU STOMA W BIOPSY/CAUTERY TUMOR/POLYP/LESION  12/199    Dr. Dow, one adenomatous polyp     HC ESOPHAGOSCOPY, DIAGNOSTIC  5/03    Dr. Dow, lower esophageal ring & mild gastritis     HERNIORRHAPHY INGUINAL Right 9/26/2016    Procedure: HERNIORRHAPHY INGUINAL;  Surgeon: Alexis Alexandra MD;  Location: [facility]     LAPAROSCOPIC CHOLECYSTECTOMY  12/03    for biliary sludge     ZZC NONSPECIFIC PROCEDURE  5/02    Coronary artery bypass       FAMILY HISTORY:  Family History   Problem Relation Age of Onset     C.A.D. Father      Cancer Mother          breast cancer,  of pneumonia     Cerebrovascular Disease Son      CABG Son      Family History Negative Child      Family History Negative Sister      Heart Disease Brother        SOCIAL HISTORY:  Social History     Socioeconomic History     Marital status:      Spouse name: Kailey     Number of children: 3     Years of education: None     Highest education level: None   Occupational History     Occupation: O[employer]     Employer: RETIRED   Social Needs     Financial resource strain: None     Food insecurity     Worry: None     Inability: None     Transportation needs     Medical: None     Non-medical: None   Tobacco Use     Smoking status: Former Smoker     Types: Cigarettes     Smokeless tobacco: Never Used     Tobacco comment: smoked for a week in 's   Substance and Sexual Activity     Alcohol use: Not Currently     Alcohol/week: 0.0 standard drinks     Drug use: No     Sexual activity: Yes     Partners: Female   Lifestyle     Physical activity     Days per week: 4 days     Minutes per session: 30 min     Stress: None   Relationships     Social connections     Talks on phone: None     Gets together: None     Attends Sabianism service: None     Active member of club or organization: None     Attends meetings of clubs or organizations: None     Relationship status: None     Intimate partner violence     Fear of current or ex partner: None     Emotionally abused: None     Physically abused: None     Forced sexual activity: None   Other Topics Concern      Service No     Blood Transfusions No     Caffeine Concern Not Asked     Occupational Exposure Not Asked     Hobby Hazards Not Asked     Sleep Concern Not Asked     Stress Concern Not Asked     Weight Concern Not Asked     Special Diet Not Asked     Back Care Not Asked     Exercise Yes     Bike Helmet Not Asked     Seat Belt Yes     Self-Exams No     Parent/sibling w/ CABG, MI or angioplasty before 65F 55M? Yes   Social History Narrative  "   Balanced Diet - Yes    Osteoporosis Preventative measures-  Dairy servings per day: 2 to 3 servings daily    Regular Exercise -  Yes Describe walks 1.5 mile daily     Dental Exam up - YES - Date: 2 years ago    Eye Exam - YES - Date: 1 year ago    Self Testicular Exam -  No, handout given    Do you have any concerns about STD's -  No    Abuse: Current or Past (Physical, Sexual or Emotional)- No    Do you feel safe in your environment - Yes    Guns stored in the home - Yes, locked    Sunscreen used - No    Seatbelts used - Yes    Lipids - YES - Date: 3-09    Glucose -  YES - Date: 3-09    Colon Cancer Screening - Colonoscopy 3-08(date completed)    Hemoccults - UNKNOWN    PSA - YES - Date: 11-07    Digital Rectal Exam - UNKNOWN    Immunizations reviewed and up to date - Yes, td 1-2005, had shingles vaccine    5-28-09  JANEY Patel CMA                       Review of Systems:  Skin:  Negative     Eyes:  Positive for glasses  ENT:  Positive for deafness  Respiratory:  Negative    Cardiovascular:  Negative    Gastroenterology: Negative    Genitourinary:  not assessed    Musculoskeletal:  Negative    Neurologic:  Negative    Psychiatric:  Negative    Heme/Lymph/Imm:  Negative    Endocrine:  Negative      Physical Exam:  Vitals: /61   Pulse 77   Ht 1.727 m (5' 8\")   Wt 63.5 kg (140 lb)   SpO2 99%   BMI 21.29 kg/m     Please refer to dictation for physical exam    Recent Lab Results:  LIPID RESULTS:  Lab Results   Component Value Date    CHOL 119 06/01/2020    HDL 48 06/01/2020    LDL 54 06/01/2020    TRIG 87 06/01/2020    CHOLHDLRATIO 3.0 07/07/2015       LIVER ENZYME RESULTS:  Lab Results   Component Value Date    AST 16 09/28/2020    ALT 19 09/28/2020       CBC RESULTS:  Lab Results   Component Value Date    WBC 4.0 12/16/2020    RBC 4.30 (L) 12/16/2020    HGB 9.9 (L) 01/19/2021    HCT 28.2 (L) 12/16/2020    MCV 66 (L) 12/16/2020    MCH 20.5 (L) 12/16/2020    MCHC 31.2 (L) 12/16/2020    RDW 18.6 (H) " 2020     (L) 2020       BMP RESULTS:  Lab Results   Component Value Date     2021    POTASSIUM 5.4 (H) 2021    CHLORIDE 105 2021    CO2 23 2021    ANIONGAP 8 2021    GLC 99 2021    BUN 48 (H) 2021    CR 1.66 (H) 2021    GFRESTIMATED 37 (L) 2021    GFRESTBLACK 43 (L) 2021    AYALA 9.9 2021        A1C RESULTS:  No results found for: A1C    INR RESULTS:  Lab Results   Component Value Date    INR 1.21 (H) 2020    INR 1.25 (H) 2020           AYSHA Rocha PA-C  [address]  [place] [address]        Service Date: 2021      CLINIC VISIT      PRIMARY CARDIOLOGIST:  Dr. Field       REASON FOR VISIT:  C.O.R.E. Clinic followup.      HISTORY OF PRESENT ILLNESS:  Mr. Ingram is a delightful 86-year-old gentleman who is seen today with the assistance of a professional .  His past medical history is significant for the followin.  Severe aortic stenosis status post TAVR 2020 with a 29 mm Medtronic Evolut Pro Plus valve.   2.  Complete heart block post TAVR with permanent pacemaker implanted.   3.  Coronary artery disease with history of CABG in .  This was a LIMA to LAD and saphenous vein graft to the OM as well as saphenous vein graft to the right PDA and the right PL and a Y graft.  He has a lesion of the right PDA at the touchdown of the graft.   4.  Heart failure with preserved EF with EF 50%-55%.   5.  Mild perivalvular leak around his aortic valve noted on subsequent echos.   6.  CKD.   7.  Hypertension.   8.  History of GI bleed.   9.  Asthma.   10.  Chronic AFib, on Coumadin, with history of left atrial appendage thrombus.      Unfortunately, Mr. Ingram has had a difficult year since his TAVR.  He had admissions for volume overload, dehydration and pneumonia.  His medications have been adjusted up and down multiple times due to these issues.  Most recently, he had had worsening  renal function with creatinine up to 2.2 where his baseline is around 1.4 to 1.5 and his lisinopril was discontinued and his torsemide was cut back to 5 mg a day.      He comes in today for followup of those events.  He is overall feeling well.  His telltale has been peripheral edema as well as shortness of breath and he has had none of these.  His legs are skinny.  He denies chest pain, shortness of breath, orthopnea or PND.  His weights at home have been between 135 and 140 pounds,.  He urinates 3-4 times a day and this does not seem excessive.  He overall feels like he is doing well.  Home blood pressures are in the 110s to 130s, more frequently in the 110s to 120s.  He has been using NoSalt on his food fairly regularly, as he has tried to avoid sodium.      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  He again is trying to watch his salt.  He is a former smoker, extremely remote, in his 20s.  No alcohol use.      PHYSICAL EXAMINATION:   GENERAL:  Well-developed, well-nourished gentleman in no acute distress, seen today with the assistance of a professional .   HEENT:  He is normocephalic, atraumatic.   HEART:  Regular in rate and rhythm.  I do not appreciate murmur, rub or gallop.   LUNGS:  Clear without wheezes, rales or rhonchi.   NECK:  Veins are flat at 30 degrees.   SKIN:  Warm and dry.   EXTREMITIES:  There is no peripheral edema.      ASSESSMENT AND PLAN:   1.  Heart failure with preserved EF.  Appears euvolemic today *** home dry weight to be between 136-141 pounds.  His creatinine has improved, although it is not quite back to baseline.  It is trending in that direction.  At this point, I think we need to keep him off lisinopril and we will keep him on Imdur and hydralazine as afterload reduction instead.  I will increase his Imdur to 30  mg daily for ease of dosing and improve his blood pressures slightly that are borderline here.  He will continue on 5 mg of torsemide and currently has class II symptoms.  He did try to do the My Health Tracker but it did not work for him.  At this point, we agreed that he will call us for a weight of 141 and we can discuss if he has any symptoms to make adjustments.  I do note that he has a small perivalvular leak around his aortic valve and that is something we need to keep in mind going forward if he has worsening heart failure symptoms, if that needs to be reviewed with a ALINE and may be contributing to the problem.     2.  Status post aortic valve replacement with a Medtronic valve as above.  Small perivalvular leak, likely not contributing at this time but if we cannot get a handle on things, we will reassess.  No aortic stenosis noted.   3.  CKD, improving.   4.  Hypertension, well controlled.   5.  Anemia with history of GI bleed in May.  This is stable at 9.9.  It did normalize or near-normalize up to 11.5 in July.  We will continue to follow with a repeat hemoglobin.  We may need to consider iron studies again and possible iron infusion.      Thank you for allowing me to participate in this delightful patient's care.  We will see him back in about 3 weeks with a BMP and hemoglobin prior, sooner with concerns.      BON Maloney PA-C             D: 2021   T: 2021   MT: DANI      Name:     SRIKANTH OBANDO   MRN:      [id number]        Account:      [id number]   :      1934           Service Date: 2021      Document: [id number]        Thank you for allowing me to participate in the care of your patient.      Sincerely,     Taylor Daniels PA-C     [facility]    cc:   Amee Rocha PA-C  [address]  [place] [address]

## 2021-01-19 NOTE — LETTER
2021      Dany Rodriguez MD, MD  [address] [place] [id number]      RE: Shiraz Ingram       Dear Colleague,    I had the pleasure of seeing Shiraz Ingram in the [facility].    Service Date: 2021      CLINIC VISIT      PRIMARY CARDIOLOGIST:  Dr. Field       REASON FOR VISIT:  C.O.R.E. Clinic followup.      HISTORY OF PRESENT ILLNESS:  Mr. Ingram is a delightful 86-year-old gentleman who is seen today with the assistance of a professional .  His past medical history is significant for the followin.  Severe aortic stenosis status post TAVR 2020 with a 29 mm Medtronic Evolut Pro Plus valve.   2.  Complete heart block post TAVR with permanent pacemaker implanted.   3.  Coronary artery disease with history of CABG in .  This was a LIMA to LAD and saphenous vein graft to the OM as well as saphenous vein graft to the right PDA and the right PL and a Y graft.  He has a lesion of the right PDA at the touchdown of the graft.   4.  Heart failure with preserved EF with EF 50%-55%.   5.  Mild perivalvular leak around his aortic valve noted on subsequent echos.   6.  CKD.   7.  Hypertension.   8.  History of GI bleed.   9.  Asthma.   10.  Chronic AFib, on Coumadin, with history of left atrial appendage thrombus.      Unfortunately, Mr. Ingram has had a difficult year since his TAVR.  He had admissions for volume overload, dehydration and pneumonia.  His medications have been adjusted up and down multiple times due to these issues.  Most recently, he had had worsening renal function with creatinine up to 2.2 where his baseline is around 1.4 to 1.5 and his lisinopril was discontinued and his torsemide was cut back to 5 mg a day.      He comes in today for followup of those events.  He is overall feeling well.  His telltale has been peripheral edema as well as shortness of breath and he has had none of these.  His legs are skinny.  He denies chest pain,  shortness of breath, orthopnea or PND.  His weights at home have been between 135 and 140 pounds,.  He urinates 3-4 times a day and this does not seem excessive.  He overall feels like he is doing well.  Home blood pressures are in the 110s to 130s, more frequently in the 110s to 120s.  He has been using NoSalt on his food fairly regularly, as he has tried to avoid sodium.      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  He again is trying to watch his salt.  He is a former smoker, extremely remote, in his 20s.  No alcohol use.      PHYSICAL EXAMINATION:   GENERAL:  Well-developed, well-nourished gentleman in no acute distress, seen today with the assistance of a professional .   HEENT:  He is normocephalic, atraumatic.   HEART:  Regular in rate and rhythm.  I do not appreciate murmur, rub or gallop.   LUNGS:  Clear without wheezes, rales or rhonchi.   NECK:  Veins are flat at 30 degrees.   SKIN:  Warm and dry.   EXTREMITIES:  There is no peripheral edema.      ASSESSMENT AND PLAN:   1.  Heart failure with preserved EF.  Appears euvolemic today with his home dry weight to be between 136-141 pounds.  His creatinine has improved, although it is not quite back to baseline.  It is trending in that direction.  At this point, I think we need to keep him off lisinopril and we will keep him on Imdur and hydralazine as afterload reduction instead.  I will increase his Imdur to 30 mg daily for ease of dosing and improve his blood pressures slightly that are borderline here.  He will continue on 5 mg of torsemide and currently has class II symptoms.  He did try to do the My Health Tracker but it did not work for him.  At this point, we agreed that he will call us for a weight of 141 and we can discuss if he has any symptoms to make adjustments.  I do note that  he has a small perivalvular leak around his aortic valve and that is something we need to keep in mind going forward if he has worsening heart failure symptoms, if that needs to be reviewed with a ALINE and may be contributing to the problem.     2.  Status post aortic valve replacement with a Medtronic valve as above.  Small perivalvular leak, likely not contributing at this time but if we cannot get a handle on things, we will reassess.  No aortic stenosis noted.   3.  CKD, improving.   4.  Hypertension, well controlled.   5.  Anemia with history of GI bleed in May.  This is stable at 9.9.  It did normalize or near-normalize up to 11.5 in July.  We will continue to follow with a repeat hemoglobin.  We may need to consider iron studies again and possible iron infusion.      Thank you for allowing me to participate in this delightful patient's care.  We will see him back in about 3 weeks with a BMP and hemoglobin prior, sooner with concerns.      Taylor Daniels PA-C        D: 2021   T: 2021   MT: DANI      Name:     SRIKANTH OBANDO   MRN:      [id number]        Account:      [id number]   :      1934           Service Date: 2021      Document: [id number]        Outpatient Encounter Medications as of 2021   Medication Sig Dispense Refill     albuterol (PROAIR HFA/PROVENTIL HFA/VENTOLIN HFA) 108 (90 Base) MCG/ACT inhaler INHALE 1 TO 2 PUFFS BY MOUTH EVERY 4 TO 6 HOURS AS NEEDED 18 g 2     aspirin (ASA) 81 MG EC tablet Take 81 mg by mouth daily       budesonide (PULMICORT) 0.5 MG/2ML nebulizer solution Take 2 mLs (0.5 mg) by nebulization 2 times daily 120 mL 0     calcium carbonate 600 mg-vitamin D 400 units (CALTRATE) 600-400 MG-UNIT per tablet Take 1 tablet by mouth daily       dabigatran ANTICOAGULANT (PRADAXA) 150 MG capsule Take 1 capsule (150 mg) by mouth 2 times daily Store in original 's bottle or blister pack; use within 120 days of opening. 180 capsule 3     ferrous sulfate  (IRON) 325 (65 Fe) MG tablet TAKE 1 TABLET(325 MG) BY MOUTH TWICE DAILY 180 tablet 0     hydrALAZINE (APRESOLINE) 25 MG tablet Take 0.5 tablets (12.5 mg) by mouth 3 times daily 90 tablet 3     ipratropium - albuterol 0.5 mg/2.5 mg/3 mL (DUONEB) 0.5-2.5 (3) MG/3ML nebulization Inhale 1 vial (3 mLs) into the lungs 4 times daily 360 mL 11     isosorbide mononitrate (IMDUR) 30 MG 24 hr tablet Take 1 tablet (30 mg) by mouth daily 90 tablet 3     magnesium chloride (MAG64) 535 (64 Mg) MG TBEC CR tablet Take 1 tablet (535 mg) by mouth 2 times daily 60 tablet 3     magnesium chloride 535 (64 Mg) MG TBEC CR tablet Take 1 tablet (535 mg) by mouth two times daily 28 tablet 0     meclizine (ANTIVERT) 12.5 MG tablet Take 1 tablet (12.5 mg) by mouth 3 times daily as needed for dizziness 90 tablet 1     melatonin 3 MG tablet Take 1 tablet (3 mg) by mouth nightly as needed for sleep 30 tablet 0     metoprolol tartrate (LOPRESSOR) 25 MG tablet Take 1 tablet (25 mg) by mouth 2 times daily 180 tablet 0     montelukast (SINGULAIR) 10 MG tablet Take 1 tablet (10 mg) by mouth daily 90 tablet 0     multivitamin, therapeutic (THERA-VIT) TABS tablet Take 1 tablet by mouth every evening        nitroGLYcerin (NITROSTAT) 0.4 MG sublingual tablet For chest pain place 1 tablet under the tongue every 5 minutes for 3 doses. If symptoms persist 5 minutes after 1st dose call 911. 12 tablet 0     pantoprazole (PROTONIX) 40 MG EC tablet TAKE ONE TABLET BY MOUTH TWICE A DAY BEFORE MEALS 180 tablet 0     simvastatin (ZOCOR) 40 MG tablet TAKE 1 TABLET(40 MG) BY MOUTH AT BEDTIME 30 tablet 0     torsemide (DEMADEX) 10 MG tablet Take 5mg (0.5mg) tablet daily 90 tablet 3     torsemide (DEMADEX) 5 MG tablet Take 1 tablet (5 mg) by mouth daily as needed (weight gain of 2-3 pounds in one day)       [DISCONTINUED] isosorbide mononitrate (IMDUR) 30 MG 24 hr tablet Take 0.5 tablets (15 mg) by mouth daily 45 tablet 3     No facility-administered encounter  medications on file as of 1/19/2021.        Again, thank you for allowing me to participate in the care of your patient.      Sincerely,    Taylor Daniels PA-C     [facility]

## 2021-02-03 DIAGNOSIS — D50.9 IRON DEFICIENCY ANEMIA, UNSPECIFIED IRON DEFICIENCY ANEMIA TYPE: Primary | ICD-10-CM

## 2021-02-09 DIAGNOSIS — I50.31 ACUTE DIASTOLIC HEART FAILURE (H): ICD-10-CM

## 2021-02-09 LAB
ANION GAP SERPL CALCULATED.3IONS-SCNC: 5 MMOL/L (ref 3–14)
BUN SERPL-MCNC: 46 MG/DL (ref 7–30)
CALCIUM SERPL-MCNC: 9.4 MG/DL (ref 8.5–10.1)
CHLORIDE SERPL-SCNC: 103 MMOL/L (ref 94–109)
CO2 SERPL-SCNC: 28 MMOL/L (ref 20–32)
CREAT SERPL-MCNC: 1.58 MG/DL (ref 0.66–1.25)
GFR SERPL CREATININE-BSD FRML MDRD: 39 ML/MIN/{1.73_M2}
GLUCOSE SERPL-MCNC: 101 MG/DL (ref 70–99)
HGB BLD-MCNC: 9.7 G/DL (ref 13.3–17.7)
POTASSIUM SERPL-SCNC: 5 MMOL/L (ref 3.4–5.3)
SODIUM SERPL-SCNC: 136 MMOL/L (ref 133–144)

## 2021-02-09 PROCEDURE — 36415 COLL VENOUS BLD VENIPUNCTURE: CPT | Performed by: PHYSICIAN ASSISTANT

## 2021-02-09 PROCEDURE — 85018 HEMOGLOBIN: CPT | Performed by: PHYSICIAN ASSISTANT

## 2021-02-09 PROCEDURE — 80048 BASIC METABOLIC PNL TOTAL CA: CPT | Performed by: PHYSICIAN ASSISTANT

## 2021-02-12 ENCOUNTER — TELEPHONE (OUTPATIENT)
Dept: CARDIOLOGY | Facility: CLINIC | Age: 86
End: 2021-02-12

## 2021-02-12 ENCOUNTER — OFFICE VISIT (OUTPATIENT)
Dept: CARDIOLOGY | Facility: CLINIC | Age: 86
End: 2021-02-12
Attending: PHYSICIAN ASSISTANT
Payer: COMMERCIAL

## 2021-02-12 VITALS
HEIGHT: 68 IN | WEIGHT: 148 LBS | OXYGEN SATURATION: 99 % | HEART RATE: 65 BPM | DIASTOLIC BLOOD PRESSURE: 60 MMHG | BODY MASS INDEX: 22.43 KG/M2 | SYSTOLIC BLOOD PRESSURE: 136 MMHG

## 2021-02-12 DIAGNOSIS — I50.31 ACUTE DIASTOLIC HEART FAILURE (H): ICD-10-CM

## 2021-02-12 DIAGNOSIS — I50.9 ACUTE ON CHRONIC CONGESTIVE HEART FAILURE, UNSPECIFIED HEART FAILURE TYPE (H): ICD-10-CM

## 2021-02-12 DIAGNOSIS — D50.9 IRON DEFICIENCY ANEMIA, UNSPECIFIED IRON DEFICIENCY ANEMIA TYPE: Primary | ICD-10-CM

## 2021-02-12 DIAGNOSIS — I25.10 CORONARY ARTERY DISEASE INVOLVING NATIVE CORONARY ARTERY OF NATIVE HEART WITHOUT ANGINA PECTORIS: ICD-10-CM

## 2021-02-12 PROCEDURE — 99214 OFFICE O/P EST MOD 30 MIN: CPT | Performed by: PHYSICIAN ASSISTANT

## 2021-02-12 RX ORDER — ISOSORBIDE MONONITRATE 30 MG/1
TABLET, EXTENDED RELEASE ORAL DAILY
Qty: 90 TABLET | Refills: 3 | COMMUNITY
Start: 2021-02-12 | End: 2022-01-11

## 2021-02-12 RX ORDER — DABIGATRAN ETEXILATE 150 MG/1
150 CAPSULE ORAL 2 TIMES DAILY
Qty: 180 CAPSULE | Refills: 3 | Status: SHIPPED | OUTPATIENT
Start: 2021-02-12 | End: 2022-04-25

## 2021-02-12 RX ORDER — TORSEMIDE 5 MG/1
5 TABLET ORAL DAILY
COMMUNITY
Start: 2021-02-12 | End: 2021-03-22

## 2021-02-12 ASSESSMENT — MIFFLIN-ST. JEOR: SCORE: 1325.82

## 2021-02-12 NOTE — PROGRESS NOTES
Cardiology Clinic Progress Note    Shiraz Ingram MRN# [id number]   YOB: 1934 Age: 86 year old   Primary cardiologist: Dr. Field         Assessment and Plan:     In summary, Shiraz Ingram presents today for a [facility] clinic follow up visit.     1. HFpEF, chronic, with an admission in December for acute decompensation.  Currently appears well compensated with a stable home weight around 138 pounds.  Exam is suggestive of a persistent left pleural effusion, we discussed that in the future if he develops progressive symptoms we can always consider thoracentesis for this.  He agrees that that is not needed at this time.  2.  RD and hyperkalemia on lisinopril, resolved with medication discontinuation.  3.  Hypertension, controlled.  4.  Status post TAVR in July 2020, complicated by complete heart block requiring pacemaker implant. Has questions about power tool use which I have sent to the device nurses.   5.  Chronic atrial fibrillation with history of MAYA thrombus, on anticoagulation with Pradaxa.    Plan:  -No changes today.    Follow-up:  -He is scheduled to meet Dr. Tierney on March 1.  He will have labs beforehand.  It looks like he is due for iron studies (per Dr. Morris), and I have added those orders today.    It was a pleasure seeing Shiraz, as always!        History of Presenting Illness:      Shiraz Ingram is a pleasant 86 year old patient who presents today with an  for a follow up visit.    He has a pertinent medical history of the following -  # Chronic HFpEF in the setting of VHD  # PHTN  # Severe aortic stenosis s/p TAVR 7/2020, complicated by CHB requiring PPM implant  # History of stable CAD s/p CABG in 2002 [LIMA -LAD, VG-OM and the RCA], with known moderate touchdown lesion of the right PDA branch of the YZR-aCBB-hDH Y-graft; patent LIMA to LAD, vein graft to the OM, and right posterior lateral Y graft branch as of last angiogram in 4/2020.   # CAF, with hx of MAYA thrombus,  anticoagulated.  # Chronic iron deficient anemia, followed by Dr. Morris.  # Hx of upper GI bleed in May 2020, treated with cautery, on PPI.  # HTN  # Asthma  # CKD-III, baseline creatinine runs around 1.3-1.5.  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  He is excellent with A[employer].    Unfortunately, Shiraz had a rough summer with multiple admissions. In July, he ununderwent TAVR with a 29 mm Medtronic valve with successful reduction valve gradients to 5. His procedure was complicated by CHB, requiring PPM implantation. His diuretics were stopped. Several days post-discharge, he returned to the hospital with acute HFpEF. TTE was stable. He was diuresed and placed back on oral furosemide. When he saw Daksha Smith in follow-up in August, he appeared stable. He was then admitted end of September with pneumonia and RD, and his furosemide was again stopped in the hospital. This resulted in another admission for acute HFpEF in October. He was again diuresed and ultimately discharged on torsemide 10 mg daily. Also, his PTA lisinopril was reduced from 20 BID to daily, due to acute kidney injury.     When I saw him in follow-up, beginning of November, he was feeling really well with a stable home weight of 142.5 pounds.  Unfortunately, his labs appeared prerenal, with a creatinine that had risen from 1.54 --> 1.79, BUN from 39 --> 44.  I therefore reduced his torsemide from 10 to 5 mg daily, and asked him to notify me if his weight were climb to greater than 145 pounds.    Unfortunately, he was readmitted on December 15 with volume overload.  His weight at the time was 146 pounds.  TTE showed:  1. The left ventricle is normal in size. The visual ejection fraction is  estimated at 50%. Basal to mid inferior and inferolateral hypokinesis.  2. The right ventricle is normal size. Mildly decreased right ventricular  systolic function  3. There  is moderate (2+) tricuspid regurgitation.  4. Moderate (46-55mmHg) pulmonary hypertension is present. The right  ventricular systolic pressure is approximated at 49mmHg plus the right atrial  pressure.  5. s/p TAVR, 29mm Medtronic Evolut Pro, implanted 7/2020. Mean 7mmHg, Vmax  1.9m/s, DI 0.51. Two small AI jets seen in apical views. Not seen in short  axis views. Suspect this is mild PVL.     Echo from 10-28-20 showed EF 55%, 1-2+ AI, TAVR with mean 7mmHg, Vmax 2.1m/s.    He again diuresed well with IV Lasix, and hydralazine and Imdur were added to his regimen for vasodilation.  Placed back on Torsemide 10 daily. Discharge weight was 140 pounds.    He saw Dr. Field in follow-up on December 21.  His weight was back up to 146 pounds.  However, Dr. Field felt him to be euvolemic and made no changes to his regimen.    When I saw him in follow-up, his weight had declined down to his previous baseline, and he was feeling well.  Strangely, he had acute kidney injury again with a creatinine up to 2.2 and his potassium was high at 5.4.  I stopped his lisinopril completely and reduced his torsemide to 5 mg daily. I attempted to enroll him in the my health tracker program after that, for closer monitoring of his very narrow range in euvolemia, however this was not successful in combination with the relay system.    Shiraz saw my colleague Amee Connolly on January 19.  He continued to feel really well, home weights were stable between 135-140 pounds, and his labs had improved.  Blood pressures well controlled at home.  His Imdur was increased to 30 mg daily.  He was advised to notify us if his weight goes up to 141 pounds in the future.    Today, Shiraz presents for close reassessment.  Continues to feel great from a cardiovascular standpoint.he denies shortness of breath, orthopnea, palpitations, near syncope, chest pain.  Home weight is stable at 138 lbs. Labs obtained 3 days ago show a stable creatinine of 1.58, a BUN of 46, potassium  "of 5 which is down from 5.4 a few weeks ago, and a pretty stable hemoglobin of 9.7. BP on my re-check is 136/60 mmHg.  He has very mild peripheral edema, which is always a little worse in the right leg than the left, and it is currently not bothersome to him at all.  He has blunted left lower lung sounds, consistent with a persistent left pleural effusion.  Otherwise his lungs are clear.  He has several questions for me, 1 about the COVID-19 vaccination.  He does not personally have a computer, and so I asked him to have his sons assist him in signing up at the [facility] website.  He also asked if he is able to use several different types of power tools with his pacemaker.  I sent that question over to the device clinic nurses, and will get back to them on it.  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  Looking back, does not appear that he ever had an aortic aneurysm.  I told him to use his judgment on that one.  He tells me that his sons help to keep him in check with this.         Review of Systems:     12-pt ROS is negative except for as noted in the HPI.          Physical Exam:     Vitals: BP (!) 145/64   Pulse 65   Ht 1.727 m (5' 8\")   Wt 67.1 kg (148 lb)   SpO2 99%   BMI 22.50 kg/m    Wt Readings from Last 10 Encounters:   02/12/21 67.1 kg (148 lb)   01/19/21 63.5 kg (140 lb)   01/04/21 66 kg (145 lb 6.4 oz)   12/30/20 64.4 kg (142 lb)   12/21/20 66.3 kg (146 lb 3.2 oz)   12/16/20 63.3 kg (139 lb 8 oz)   11/09/20 65.8 kg (145 lb)   11/04/20 66.2 kg (145 lb 14.4 oz)   10/30/20 64.7 kg (142 lb 11.2 oz)   10/27/20 69.4 kg (153 lb)       Constitutional:  Patient is pleasant, alert, cooperative, and in NAD.  HEENT:  NCAT. PERRLA. EOM's intact.   Neck:  CVP appears normal. No carotid bruits.   Pulmonary: Normal respiratory effort. CTAB.   Cardiac: RRR, normal S1/S2, no S3/S4, no murmur or " rub.   Abdomen:  Non-tender abdomen, no hepatosplenomegaly appreciated.   Vascular: Pulses in the upper and lower extremities are 2+ and equal bilaterally.  Extremities: No edema, erythema, cyanosis or tenderness appreciated.  Skin:  No rashes or lesions appreciated.   Neurological:  No gross motor or sensory deficits.   Psych: Appropriate affect.        Data:     Labs reviewed:  Recent Labs   Lab Test 01/04/21  0906 12/30/20  0813 12/21/20  1412 12/08/20  0957 06/01/20  0907 06/01/20  0907 06/12/19  0818 06/12/19  0818 12/12/18  0840 02/21/13  1702 02/21/13  1702   LDL  --   --   --   --   --  54  --  74 63   < >  --    HDL  --   --   --   --   --  48  --  46 52   < >  --    NHDL  --   --   --   --   --  71  --  90 77   < >  --    CHOL  --   --   --   --   --  119  --  136 129   < >  --    TRIG  --   --   --   --   --  87  --  82 71   < >  --    TSH  --   --   --   --   --   --   --   --   --   --  1.66   NTBNP 3,865* 4,451* 4,442*  --    < >  --   --   --   --   --   --    IRON  --   --   --  89   < >  --    < > 117  --    < > <10*   FEB  --   --   --  316   < >  --    < > 312  --    < > 219*   IRONSAT  --   --   --  28   < >  --    < > 38  --    < > <5*   RJ  --   --   --  607*   < >  --    < > 602*  --    < > 2,750*    < > = values in this interval not displayed.       Lab Results   Component Value Date    WBC 4.0 12/16/2020    RBC 4.30 (L) 12/16/2020    HGB 9.7 (L) 02/09/2021    HCT 28.2 (L) 12/16/2020    MCV 66 (L) 12/16/2020    MCH 20.5 (L) 12/16/2020    MCHC 31.2 (L) 12/16/2020    RDW 18.6 (H) 12/16/2020     (L) 12/16/2020       Lab Results   Component Value Date     02/09/2021    POTASSIUM 5.0 02/09/2021    CHLORIDE 103 02/09/2021    CO2 28 02/09/2021    ANIONGAP 5 02/09/2021     (H) 02/09/2021    BUN 46 (H) 02/09/2021    CR 1.58 (H) 02/09/2021    GFRESTIMATED 39 (L) 02/09/2021    GFRESTBLACK 45 (L) 02/09/2021    AYALA 9.4 02/09/2021      Lab Results   Component Value Date    AST 16  09/28/2020    ALT 19 09/28/2020       No results found for: A1C    Lab Results   Component Value Date    INR 1.21 (H) 07/13/2020    INR 1.25 (H) 06/03/2020           Problem List:     Patient Active Problem List   Diagnosis     Coronary atherosclerosis     Benign neoplasm of colon     Localized osteoarthrosis, lower leg     Allergic rhinitis     Moderate persistent asthma     Anemia     Osteoporosis     Hypertension goal BP (blood pressure) < 140/90     Hyperlipidemia with target LDL less than 100     CKD (chronic kidney disease) stage 3, GFR 30-59 ml/min     Nonrheumatic aortic valve stenosis     Advanced directives, counseling/discussion     Iron deficiency anemia     Vitamin B 12 deficiency     Health Care Home     Mild persistent asthma without complication     Acute respiratory failure (H)     Acute on chronic congestive heart failure, unspecified heart failure type (H)     Severe aortic stenosis     Coronary artery disease involving native coronary artery of native heart without angina pectoris     S/P CABG (coronary artery bypass graft)     Hyperkalemia     GI bleed     Pneumonia     Atrial fibrillation (H)     Thrombus of left atrial appendage     Complete heart block (H)     Aortic stenosis, severe     Acute exacerbation of CHF (congestive heart failure) (H)     Community acquired pneumonia     Acute congestive heart failure, unspecified heart failure type (H)     History of transcatheter aortic valve replacement (TAVR)           Medications:     Current Outpatient Medications   Medication Sig Dispense Refill     albuterol (PROAIR HFA/PROVENTIL HFA/VENTOLIN HFA) 108 (90 Base) MCG/ACT inhaler INHALE 1 TO 2 PUFFS BY MOUTH EVERY 4 TO 6 HOURS AS NEEDED 18 g 2     aspirin (ASA) 81 MG EC tablet Take 81 mg by mouth daily       budesonide (PULMICORT) 0.5 MG/2ML nebulizer solution Take 2 mLs (0.5 mg) by nebulization 2 times daily 120 mL 0     calcium carbonate 600 mg-vitamin D 400 units (CALTRATE) 600-400 MG-UNIT  per tablet Take 1 tablet by mouth daily       dabigatran ANTICOAGULANT (PRADAXA) 150 MG capsule Take 1 capsule (150 mg) by mouth 2 times daily Store in original 's bottle or blister pack; use within 120 days of opening. 180 capsule 3     ferrous sulfate (IRON) 325 (65 Fe) MG tablet TAKE 1 TABLET(325 MG) BY MOUTH TWICE DAILY 180 tablet 0     hydrALAZINE (APRESOLINE) 25 MG tablet Take 0.5 tablets (12.5 mg) by mouth 3 times daily 90 tablet 3     ipratropium - albuterol 0.5 mg/2.5 mg/3 mL (DUONEB) 0.5-2.5 (3) MG/3ML nebulization Inhale 1 vial (3 mLs) into the lungs 4 times daily 360 mL 11     isosorbide mononitrate (IMDUR) 30 MG 24 hr tablet Take 1 tablet (30 mg) by mouth daily (Patient taking differently: Take 30 mg by mouth daily Pt take 1/2tab of 30mg daily) 90 tablet 3     magnesium chloride (MAG64) 535 (64 Mg) MG TBEC CR tablet Take 1 tablet (535 mg) by mouth 2 times daily 60 tablet 3     magnesium chloride 535 (64 Mg) MG TBEC CR tablet Take 1 tablet (535 mg) by mouth two times daily 28 tablet 0     meclizine (ANTIVERT) 12.5 MG tablet Take 1 tablet (12.5 mg) by mouth 3 times daily as needed for dizziness 90 tablet 1     melatonin 3 MG tablet Take 1 tablet (3 mg) by mouth nightly as needed for sleep 30 tablet 0     metoprolol tartrate (LOPRESSOR) 25 MG tablet Take 1 tablet (25 mg) by mouth 2 times daily 180 tablet 0     montelukast (SINGULAIR) 10 MG tablet Take 1 tablet (10 mg) by mouth daily 90 tablet 0     multivitamin, therapeutic (THERA-VIT) TABS tablet Take 1 tablet by mouth every evening        nitroGLYcerin (NITROSTAT) 0.4 MG sublingual tablet For chest pain place 1 tablet under the tongue every 5 minutes for 3 doses. If symptoms persist 5 minutes after 1st dose call 911. 12 tablet 0     pantoprazole (PROTONIX) 40 MG EC tablet TAKE ONE TABLET BY MOUTH TWICE A DAY BEFORE MEALS 180 tablet 0     simvastatin (ZOCOR) 40 MG tablet TAKE 1 TABLET(40 MG) BY MOUTH AT BEDTIME 90 tablet 0     torsemide  (DEMADEX) 10 MG tablet Take 5mg (0.5mg) tablet daily 90 tablet 3     torsemide (DEMADEX) 5 MG tablet Take 1 tablet (5 mg) by mouth daily as needed (weight gain of 2-3 pounds in one day)             Past Medical History:     Past Medical History:   Diagnosis Date     Allergic rhinitis, cause unspecified      Anemia, unspecified      Aortic stenosis     severe     Benign neoplasm of colon 1999    Ademonatous polyp     CKD (chronic kidney disease) stage 3, GFR 30-59 ml/min 05/12/2011     Coronary atherosclerosis of unspecified type of vessel, native or graft     s/p CABG 2002     Hyperlipidemia LDL goal < 100      Hypertension goal BP (blood pressure) < 140/90      Localized osteoarthrosis not specified whether primary or secondary, lower leg     left knee     Moderate persistent asthma      Pacemaker      Persistent atrial fibrillation (H)      Unspecified hearing loss      Past Surgical History:   Procedure Laterality Date     CARDIAC SURGERY       COLONOSCOPY N/A 5/26/2020    Procedure: COLONOSCOPY;  Surgeon: Ignacio Fournier MD;  Location:  GI     CV ANGIOGRAM CORONARY GRAFT N/A 4/24/2020    Procedure: Angiogram Coronary Graft;  Surgeon: Addison Willard MD;  Location: [facility] CARDIAC CATH LAB     CV CORONARY ANGIOGRAM N/A 4/24/2020    Procedure: Coronary Angiogram;  Surgeon: Addison Willard MD;  Location:  HEART CARDIAC CATH LAB     CV RIGHT HEART CATH MEASUREMENTS RECORDED N/A 4/24/2020    Procedure: Right Heart Cath;  Surgeon: Addison Willard MD;  Location: [facility] CARDIAC CATH LAB     CV TRANSCATHETER AORTIC VALVE REPLACEMENT N/A 7/14/2020    Procedure: Transcatheter Aortic Valve Replacement;  Surgeon: Soraida Monet MD;  Location:  HEART CARDIAC CATH LAB     EP PACEMAKER N/A 7/15/2020    Procedure: EP Pacemaker;  Surgeon: Tommie Sauer MD;  Location:  HEART CARDIAC CATH LAB     HC COLONOSCOPY THRU STOMA W BIOPSY/CAUTERY TUMOR/POLYP/LESION  5/03    Dr. Dow, Q  5 years     HC COLONOSCOPY THRU STOMA W BIOPSY/CAUTERY TUMOR/POLYP/LESION      Dr. Dow, one adenomatous polyp     HC ESOPHAGOSCOPY, DIAGNOSTIC      Dr. Dow, lower esophageal ring & mild gastritis     HERNIORRHAPHY INGUINAL Right 2016    Procedure: HERNIORRHAPHY INGUINAL;  Surgeon: Alexis Alexandra MD;  Location: [facility]     LAPAROSCOPIC CHOLECYSTECTOMY      for biliary sludge     ZZC NONSPECIFIC PROCEDURE      Coronary artery bypass     Family History   Problem Relation Age of Onset     C.A.D. Father      Cancer Mother         breast cancer,  of pneumonia     Cerebrovascular Disease Son      CABG Son      Family History Negative Child      Family History Negative Sister      Heart Disease Brother      Social History     Socioeconomic History     Marital status:      Spouse name: Kailey     Number of children: 3     Years of education: Not on file     Highest education level: Not on file   Occupational History     Occupation: [employer]     Employer: RETIRED   Social Needs     Financial resource strain: Not on file     Food insecurity     Worry: Not on file     Inability: Not on file     Transportation needs     Medical: Not on file     Non-medical: Not on file   Tobacco Use     Smoking status: Former Smoker     Types: Cigarettes     Smokeless tobacco: Never Used     Tobacco comment: smoked for a week in 's   Substance and Sexual Activity     Alcohol use: Not Currently     Alcohol/week: 0.0 standard drinks     Drug use: No     Sexual activity: Yes     Partners: Female   Lifestyle     Physical activity     Days per week: 4 days     Minutes per session: 30 min     Stress: Not on file   Relationships     Social connections     Talks on phone: Not on file     Gets together: Not on file     Attends Spiritism service: Not on file     Active member of club or organization: Not on file     Attends meetings of clubs or organizations: Not on file     Relationship status: Not on file      Intimate partner violence     Fear of current or ex partner: Not on file     Emotionally abused: Not on file     Physically abused: Not on file     Forced sexual activity: Not on file   Other Topics Concern      Service No     Blood Transfusions No     Caffeine Concern Not Asked     Occupational Exposure Not Asked     Hobby Hazards Not Asked     Sleep Concern Not Asked     Stress Concern Not Asked     Weight Concern Not Asked     Special Diet Not Asked     Back Care Not Asked     Exercise Yes     Bike Helmet Not Asked     Seat Belt Yes     Self-Exams No     Parent/sibling w/ CABG, MI or angioplasty before 65F 55M? Yes   Social History Narrative    Balanced Diet - Yes    Osteoporosis Preventative measures-  Dairy servings per day: 2 to 3 servings daily    Regular Exercise -  Yes Describe walks 1.5 mile daily     Dental Exam up - YES - Date: 2 years ago    Eye Exam - YES - Date: 1 year ago    Self Testicular Exam -  No, handout given    Do you have any concerns about STD's -  No    Abuse: Current or Past (Physical, Sexual or Emotional)- No    Do you feel safe in your environment - Yes    Guns stored in the home - Yes, locked    Sunscreen used - No    Seatbelts used - Yes    Lipids - YES - Date: 3-09    Glucose -  YES - Date: 3-09    Colon Cancer Screening - Colonoscopy 3-08(date completed)    Hemoccults - UNKNOWN    PSA - YES - Date: 11-07    Digital Rectal Exam - UNKNOWN    Immunizations reviewed and up to date - Yes, td 1-2005, had shingles vaccine    5-28-09  JANEY Patel CMA                           Allergies:   Patient has no known allergies.      [facility]  Pager: [phone]

## 2021-02-12 NOTE — LETTER
2/12/2021    Dany Rodriguez MD, MD  [address] [place] [id number]    RE: Shiraz Ingram       Dear Colleague,    I had the pleasure of seeing Shiraz Ingram in the [facility].  Cardiology Clinic Progress Note    Shiraz Ingram MRN# [id number]   YOB: 1934 Age: 86 year old   Primary cardiologist: Dr. Field         Assessment and Plan:     In summary, Shiraz Ingram presents today for a CORE clinic follow up visit.     1. HFpEF, chronic, with an admission in December for acute decompensation.  Currently appears well compensated with a stable home weight around 138 pounds.  Exam is suggestive of a persistent left pleural effusion, we discussed that in the future if he develops progressive symptoms we can always consider thoracentesis for this.  He agrees that that is not needed at this time.  2.  RD and hyperkalemia on lisinopril, resolved with medication discontinuation.  3.  Hypertension, controlled.  4.  Status post TAVR in July 2020, complicated by complete heart block requiring pacemaker implant. Has questions about power tool use which I have sent to the device nurses.   5.  Chronic atrial fibrillation with history of MAYA thrombus, on anticoagulation with Pradaxa.    Plan:  -No changes today.    Follow-up:  -He is scheduled to meet Dr. Tierney on March 1.  He will have labs beforehand.  It looks like he is due for iron studies (per Dr. Morris), and I have added those orders today.    It was a pleasure seeing Shiraz, as always!        History of Presenting Illness:      Shiraz Ingram is a pleasant 86 year old patient who presents today with an  for a follow up visit.    He has a pertinent medical history of the following -  # Chronic HFpEF in the setting of VHD  # PHTN  # Severe aortic stenosis s/p TAVR 7/2020, complicated by CHB requiring PPM implant  # History of stable CAD s/p CABG in 2002 [LIMA -LAD, VG-OM and the RCA],  with known moderate touchdown lesion of the right PDA branch of the RRP-gHZE-tNO Y-graft; patent LIMA to LAD, vein graft to the OM, and right posterior lateral Y graft branch as of last angiogram in 4/2020.   # CAF, with hx of MAYA thrombus, anticoagulated.  # Chronic iron deficient anemia, followed by Dr. Morris.  # Hx of upper GI bleed in May 2020, treated with cautery, on PPI.  # HTN  # Asthma  # CKD-III, baseline creatinine runs around 1.3-1.5.  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  He is excellent with ASL.    Unfortunately, Shiraz had a rough summer with multiple admissions. In July, he ununderwent TAVR with a 29 mm Medtronic valve with successful reduction valve gradients to 5. His procedure was complicated by CHB, requiring PPM implantation. His diuretics were stopped. Several days post-discharge, he returned to the hospital with acute HFpEF. TTE was stable. He was diuresed and placed back on oral furosemide. When he saw Daksha Smith in follow-up in August, he appeared stable. He was then admitted end of September with pneumonia and RD, and his furosemide was again stopped in the hospital. This resulted in another admission for acute HFpEF in October. He was again diuresed and ultimately discharged on torsemide 10 mg daily. Also, his PTA lisinopril was reduced from 20 BID to daily, due to acute kidney injury.     When I saw him in follow-up, beginning of November, he was feeling really well with a stable home weight of 142.5 pounds.  Unfortunately, his labs appeared prerenal, with a creatinine that had risen from 1.54 --> 1.79, BUN from 39 --> 44.  I therefore reduced his torsemide from 10 to 5 mg daily, and asked him to notify me if his weight were climb to greater than 145 pounds.    Unfortunately, he was readmitted on December 15 with volume overload.  His weight at the time was 146 pounds.  TTE showed:  1. The  left ventricle is normal in size. The visual ejection fraction is  estimated at 50%. Basal to mid inferior and inferolateral hypokinesis.  2. The right ventricle is normal size. Mildly decreased right ventricular  systolic function  3. There is moderate (2+) tricuspid regurgitation.  4. Moderate (46-55mmHg) pulmonary hypertension is present. The right  ventricular systolic pressure is approximated at 49mmHg plus the right atrial  pressure.  5. s/p TAVR, 29mm Medtronic Evolut Pro, implanted 7/2020. Mean 7mmHg, Vmax  1.9m/s, DI 0.51. Two small AI jets seen in apical views. Not seen in short  axis views. Suspect this is mild PVL.     Echo from 10-28-20 showed EF 55%, 1-2+ AI, TAVR with mean 7mmHg, Vmax 2.1m/s.    He again diuresed well with IV Lasix, and hydralazine and Imdur were added to his regimen for vasodilation.  Placed back on Torsemide 10 daily. Discharge weight was 140 pounds.    He saw Dr. Field in follow-up on December 21.  His weight was back up to 146 pounds.  However, Dr. Field felt him to be euvolemic and made no changes to his regimen.    When I saw him in follow-up, his weight had declined down to his previous baseline, and he was feeling well.  Strangely, he had acute kidney injury again with a creatinine up to 2.2 and his potassium was high at 5.4.  I stopped his lisinopril completely and reduced his torsemide to 5 mg daily. I attempted to enroll him in the my health tracker program after that, for closer monitoring of his very narrow range in euvolemia, however this was not successful in combination with the relay system.    Shiraz saw my colleague Amee Connolly on January 19.  He continued to feel really well, home weights were stable between 135-140 pounds, and his labs had improved.  Blood pressures well controlled at home.  His Imdur was increased to 30 mg daily.  He was advised to notify us if his weight goes up to 141 pounds in the future.    Today, Shiraz presents for close reassessment.  Continues to  "feel great from a cardiovascular standpoint.he denies shortness of breath, orthopnea, palpitations, near syncope, chest pain.  Home weight is stable at 138 lbs. Labs obtained 3 days ago show a stable creatinine of 1.58, a BUN of 46, potassium of 5 which is down from 5.4 a few weeks ago, and a pretty stable hemoglobin of 9.7. BP on my re-check is 136/60 mmHg.  He has very mild peripheral edema, which is always a little worse in the right leg than the left, and it is currently not bothersome to him at all.  He has blunted left lower lung sounds, consistent with a persistent left pleural effusion.  Otherwise his lungs are clear.  He has several questions for me, 1 about the COVID-19 vaccination.  He does not personally have a computer, and so I asked him to have his sons assist him in signing up at the [facility] website.  He also asked if he is able to use several different types of power tools with his pacemaker.  I sent that question over to the device clinic nurses, and will get back to them on it.  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  Looking back, does not appear that he ever had an aortic aneurysm.  I told him to use his judgment on that one.  He tells me that his sons help to keep him in check with this.         Review of Systems:     12-pt ROS is negative except for as noted in the HPI.          Physical Exam:     Vitals: BP (!) 145/64   Pulse 65   Ht 1.727 m (5' 8\")   Wt 67.1 kg (148 lb)   SpO2 99%   BMI 22.50 kg/m    Wt Readings from Last 10 Encounters:   02/12/21 67.1 kg (148 lb)   01/19/21 63.5 kg (140 lb)   01/04/21 66 kg (145 lb 6.4 oz)   12/30/20 64.4 kg (142 lb)   12/21/20 66.3 kg (146 lb 3.2 oz)   12/16/20 63.3 kg (139 lb 8 oz)   11/09/20 65.8 kg (145 lb)   11/04/20 66.2 kg (145 lb 14.4 oz)   10/30/20 64.7 kg (142 lb 11.2 oz)   10/27/20 69.4 kg (153 lb) "       Constitutional:  Patient is pleasant, alert, cooperative, and in NAD.  HEENT:  NCAT. PERRLA. EOM's intact.   Neck:  CVP appears normal. No carotid bruits.   Pulmonary: Normal respiratory effort. CTAB.   Cardiac: RRR, normal S1/S2, no S3/S4, no murmur or rub.   Abdomen:  Non-tender abdomen, no hepatosplenomegaly appreciated.   Vascular: Pulses in the upper and lower extremities are 2+ and equal bilaterally.  Extremities: No edema, erythema, cyanosis or tenderness appreciated.  Skin:  No rashes or lesions appreciated.   Neurological:  No gross motor or sensory deficits.   Psych: Appropriate affect.        Data:     Labs reviewed:  Recent Labs   Lab Test 01/04/21  0906 12/30/20  0813 12/21/20  1412 12/08/20  0957 06/01/20  0907 06/01/20  0907 06/12/19  0818 06/12/19  0818 12/12/18  0840 02/21/13  1702 02/21/13  1702   LDL  --   --   --   --   --  54  --  74 63   < >  --    HDL  --   --   --   --   --  48  --  46 52   < >  --    NHDL  --   --   --   --   --  71  --  90 77   < >  --    CHOL  --   --   --   --   --  119  --  136 129   < >  --    TRIG  --   --   --   --   --  87  --  82 71   < >  --    TSH  --   --   --   --   --   --   --   --   --   --  1.66   NTBNP 3,865* 4,451* 4,442*  --    < >  --   --   --   --   --   --    IRON  --   --   --  89   < >  --    < > 117  --    < > <10*   FEB  --   --   --  316   < >  --    < > 312  --    < > 219*   IRONSAT  --   --   --  28   < >  --    < > 38  --    < > <5*   RJ  --   --   --  607*   < >  --    < > 602*  --    < > 2,750*    < > = values in this interval not displayed.       Lab Results   Component Value Date    WBC 4.0 12/16/2020    RBC 4.30 (L) 12/16/2020    HGB 9.7 (L) 02/09/2021    HCT 28.2 (L) 12/16/2020    MCV 66 (L) 12/16/2020    MCH 20.5 (L) 12/16/2020    MCHC 31.2 (L) 12/16/2020    RDW 18.6 (H) 12/16/2020     (L) 12/16/2020       Lab Results   Component Value Date     02/09/2021    POTASSIUM 5.0 02/09/2021    CHLORIDE 103 02/09/2021    CO2  28 02/09/2021    ANIONGAP 5 02/09/2021     (H) 02/09/2021    BUN 46 (H) 02/09/2021    CR 1.58 (H) 02/09/2021    GFRESTIMATED 39 (L) 02/09/2021    GFRESTBLACK 45 (L) 02/09/2021    AYALA 9.4 02/09/2021      Lab Results   Component Value Date    AST 16 09/28/2020    ALT 19 09/28/2020       No results found for: A1C    Lab Results   Component Value Date    INR 1.21 (H) 07/13/2020    INR 1.25 (H) 06/03/2020           Problem List:     Patient Active Problem List   Diagnosis     Coronary atherosclerosis     Benign neoplasm of colon     Localized osteoarthrosis, lower leg     Allergic rhinitis     Moderate persistent asthma     Anemia     Osteoporosis     Hypertension goal BP (blood pressure) < 140/90     Hyperlipidemia with target LDL less than 100     CKD (chronic kidney disease) stage 3, GFR 30-59 ml/min     Nonrheumatic aortic valve stenosis     Advanced directives, counseling/discussion     Iron deficiency anemia     Vitamin B 12 deficiency     Health Care Home     Mild persistent asthma without complication     Acute respiratory failure (H)     Acute on chronic congestive heart failure, unspecified heart failure type (H)     Severe aortic stenosis     Coronary artery disease involving native coronary artery of native heart without angina pectoris     S/P CABG (coronary artery bypass graft)     Hyperkalemia     GI bleed     Pneumonia     Atrial fibrillation (H)     Thrombus of left atrial appendage     Complete heart block (H)     Aortic stenosis, severe     Acute exacerbation of CHF (congestive heart failure) (H)     Community acquired pneumonia     Acute congestive heart failure, unspecified heart failure type (H)     History of transcatheter aortic valve replacement (TAVR)           Medications:     Current Outpatient Medications   Medication Sig Dispense Refill     albuterol (PROAIR HFA/PROVENTIL HFA/VENTOLIN HFA) 108 (90 Base) MCG/ACT inhaler INHALE 1 TO 2 PUFFS BY MOUTH EVERY 4 TO 6 HOURS AS NEEDED 18 g 2      aspirin (ASA) 81 MG EC tablet Take 81 mg by mouth daily       budesonide (PULMICORT) 0.5 MG/2ML nebulizer solution Take 2 mLs (0.5 mg) by nebulization 2 times daily 120 mL 0     calcium carbonate 600 mg-vitamin D 400 units (CALTRATE) 600-400 MG-UNIT per tablet Take 1 tablet by mouth daily       dabigatran ANTICOAGULANT (PRADAXA) 150 MG capsule Take 1 capsule (150 mg) by mouth 2 times daily Store in original 's bottle or blister pack; use within 120 days of opening. 180 capsule 3     ferrous sulfate (IRON) 325 (65 Fe) MG tablet TAKE 1 TABLET(325 MG) BY MOUTH TWICE DAILY 180 tablet 0     hydrALAZINE (APRESOLINE) 25 MG tablet Take 0.5 tablets (12.5 mg) by mouth 3 times daily 90 tablet 3     ipratropium - albuterol 0.5 mg/2.5 mg/3 mL (DUONEB) 0.5-2.5 (3) MG/3ML nebulization Inhale 1 vial (3 mLs) into the lungs 4 times daily 360 mL 11     isosorbide mononitrate (IMDUR) 30 MG 24 hr tablet Take 1 tablet (30 mg) by mouth daily (Patient taking differently: Take 30 mg by mouth daily Pt take 1/2tab of 30mg daily) 90 tablet 3     magnesium chloride (MAG64) 535 (64 Mg) MG TBEC CR tablet Take 1 tablet (535 mg) by mouth 2 times daily 60 tablet 3     magnesium chloride 535 (64 Mg) MG TBEC CR tablet Take 1 tablet (535 mg) by mouth two times daily 28 tablet 0     meclizine (ANTIVERT) 12.5 MG tablet Take 1 tablet (12.5 mg) by mouth 3 times daily as needed for dizziness 90 tablet 1     melatonin 3 MG tablet Take 1 tablet (3 mg) by mouth nightly as needed for sleep 30 tablet 0     metoprolol tartrate (LOPRESSOR) 25 MG tablet Take 1 tablet (25 mg) by mouth 2 times daily 180 tablet 0     montelukast (SINGULAIR) 10 MG tablet Take 1 tablet (10 mg) by mouth daily 90 tablet 0     multivitamin, therapeutic (THERA-VIT) TABS tablet Take 1 tablet by mouth every evening        nitroGLYcerin (NITROSTAT) 0.4 MG sublingual tablet For chest pain place 1 tablet under the tongue every 5 minutes for 3 doses. If symptoms persist 5 minutes  after 1st dose call 911. 12 tablet 0     pantoprazole (PROTONIX) 40 MG EC tablet TAKE ONE TABLET BY MOUTH TWICE A DAY BEFORE MEALS 180 tablet 0     simvastatin (ZOCOR) 40 MG tablet TAKE 1 TABLET(40 MG) BY MOUTH AT BEDTIME 90 tablet 0     torsemide (DEMADEX) 10 MG tablet Take 5mg (0.5mg) tablet daily 90 tablet 3     torsemide (DEMADEX) 5 MG tablet Take 1 tablet (5 mg) by mouth daily as needed (weight gain of 2-3 pounds in one day)             Past Medical History:     Past Medical History:   Diagnosis Date     Allergic rhinitis, cause unspecified      Anemia, unspecified      Aortic stenosis     severe     Benign neoplasm of colon 1999    Ademonatous polyp     CKD (chronic kidney disease) stage 3, GFR 30-59 ml/min 05/12/2011     Coronary atherosclerosis of unspecified type of vessel, native or graft     s/p CABG 2002     Hyperlipidemia LDL goal < 100      Hypertension goal BP (blood pressure) < 140/90      Localized osteoarthrosis not specified whether primary or secondary, lower leg     left knee     Moderate persistent asthma      Pacemaker      Persistent atrial fibrillation (H)      Unspecified hearing loss      Past Surgical History:   Procedure Laterality Date     CARDIAC SURGERY       COLONOSCOPY N/A 5/26/2020    Procedure: COLONOSCOPY;  Surgeon: Ignacio Fournier MD;  Location:  GI     CV ANGIOGRAM CORONARY GRAFT N/A 4/24/2020    Procedure: Angiogram Coronary Graft;  Surgeon: Addison Willard MD;  Location:  HEART CARDIAC CATH LAB     CV CORONARY ANGIOGRAM N/A 4/24/2020    Procedure: Coronary Angiogram;  Surgeon: Addison Willard MD;  Location:  HEART CARDIAC CATH LAB     CV RIGHT HEART CATH MEASUREMENTS RECORDED N/A 4/24/2020    Procedure: Right Heart Cath;  Surgeon: Addison Willard MD;  Location:  HEART CARDIAC CATH LAB     CV TRANSCATHETER AORTIC VALVE REPLACEMENT N/A 7/14/2020    Procedure: Transcatheter Aortic Valve Replacement;  Surgeon: Soraida Monet MD;   Location:  HEART CARDIAC CATH LAB     EP PACEMAKER N/A 7/15/2020    Procedure: EP Pacemaker;  Surgeon: Tommie Sauer MD;  Location:  HEART CARDIAC CATH LAB     HC COLONOSCOPY THRU STOMA W BIOPSY/CAUTERY TUMOR/POLYP/LESION      Dr. Dow, Q 5 years     HC COLONOSCOPY THRU STOMA W BIOPSY/CAUTERY TUMOR/POLYP/LESION      Dr. Dow, one adenomatous polyp     HC ESOPHAGOSCOPY, DIAGNOSTIC      Dr. Dow, lower esophageal ring & mild gastritis     HERNIORRHAPHY INGUINAL Right 2016    Procedure: HERNIORRHAPHY INGUINAL;  Surgeon: Alexis Alexandra MD;  Location: [facility]     LAPAROSCOPIC CHOLECYSTECTOMY      for biliary sludge     ZZC NONSPECIFIC PROCEDURE      Coronary artery bypass     Family History   Problem Relation Age of Onset     C.A.D. Father      Cancer Mother         breast cancer,  of pneumonia     Cerebrovascular Disease Son      CABG Son      Family History Negative Child      Family History Negative Sister      Heart Disease Brother      Social History     Socioeconomic History     Marital status:      Spouse name: Kailey     Number of children: 3     Years of education: Not on file     Highest education level: Not on file   Occupational History     Occupation: [employer]     Employer: RETIRED   Social Needs     Financial resource strain: Not on file     Food insecurity     Worry: Not on file     Inability: Not on file     Transportation needs     Medical: Not on file     Non-medical: Not on file   Tobacco Use     Smoking status: Former Smoker     Types: Cigarettes     Smokeless tobacco: Never Used     Tobacco comment: smoked for a week in    Substance and Sexual Activity     Alcohol use: Not Currently     Alcohol/week: 0.0 standard drinks     Drug use: No     Sexual activity: Yes     Partners: Female   Lifestyle     Physical activity     Days per week: 4 days     Minutes per session: 30 min     Stress: Not on file   Relationships     Social connections      Talks on phone: Not on file     Gets together: Not on file     Attends Cheondoism service: Not on file     Active member of club or organization: Not on file     Attends meetings of clubs or organizations: Not on file     Relationship status: Not on file     Intimate partner violence     Fear of current or ex partner: Not on file     Emotionally abused: Not on file     Physically abused: Not on file     Forced sexual activity: Not on file   Other Topics Concern      Service No     Blood Transfusions No     Caffeine Concern Not Asked     Occupational Exposure Not Asked     Hobby Hazards Not Asked     Sleep Concern Not Asked     Stress Concern Not Asked     Weight Concern Not Asked     Special Diet Not Asked     Back Care Not Asked     Exercise Yes     Bike Helmet Not Asked     Seat Belt Yes     Self-Exams No     Parent/sibling w/ CABG, MI or angioplasty before 65F 55M? Yes   Social History Narrative    Balanced Diet - Yes    Osteoporosis Preventative measures-  Dairy servings per day: 2 to 3 servings daily    Regular Exercise -  Yes Describe walks 1.5 mile daily     Dental Exam up - YES - Date: 2 years ago    Eye Exam - YES - Date: 1 year ago    Self Testicular Exam -  No, handout given    Do you have any concerns about STD's -  No    Abuse: Current or Past (Physical, Sexual or Emotional)- No    Do you feel safe in your environment - Yes    Guns stored in the home - Yes, locked    Sunscreen used - No    Seatbelts used - Yes    Lipids - YES - Date: 3-09    Glucose -  YES - Date: 3-09    Colon Cancer Screening - Colonoscopy 3-08(date completed)    Hemoccults - UNKNOWN    PSA - YES - Date: 11-07    Digital Rectal Exam - UNKNOWN    Immunizations reviewed and up to date - Yes, td 1-2005, had shingles vaccine    5-28-09  JANEY Patel CMA                           Allergies:   Patient has no known allergies.      [facility] [place] - [facility] Clinic  Pager: [phone]

## 2021-02-12 NOTE — TELEPHONE ENCOUNTER
Received message from Amee Rocha that patient has questions about using specific saws with his Medtronic pacemaker device.     Called patient's son, Willi, and provided him with [employer] phone number so they can review the specific saws with them.

## 2021-02-12 NOTE — PATIENT INSTRUCTIONS
Today, we discussed the following:   - Results: The kidney function is stable, your potassium level has normalized, and your blood count is stable. Dr. Morris had wanted your iron studies re-checked in March, so I added those to the labs you will have drawn before you meet the Heart Failure MD Dr. Tierney in March.   - Medication changes:  None today.   - Follow up: With Dr. Tierney as scheduled on .    Ask your son to to the [facility] website to sign up for the COVID-19 vaccine. It is currently a lottery system where your name is entered at different locations, and you are contacted if your name is drawn to receive the vaccine. They should be able to take care of this for you.    I'll let you know what the device nurses say about using the electric tools and your pacemaker.     Please, remember to continue the followin.  Weigh yourself daily. Call if your weight is up > than 2 pounds in one day, or 5 pounds in one week; if you feel more short of breath or have worsening swelling in your legs or abdomen.  2.  Eat a low sodium diet (less than 2,000mg or 2g daily). If you eat less salt, you will retain less fluid.   3.  Avoid alcohol, as this can worsen heart failure.   4.  Avoid NSAIDs as able (For example, Ibuprofen / aleve / advil / naprosen / diclofenac).    Please call CORE nurse (Rhoda) for any questions or concerns Mon-Fri 8am-4pm:   [phone]  For concerns after hours:   [phone] option 2   Scheduling phone number:   [phone]    Thank you for visiting with us today.   Amee Rocha PA-C  ______________________________________________________________

## 2021-02-17 ENCOUNTER — IMMUNIZATION (OUTPATIENT)
Dept: NURSING | Facility: CLINIC | Age: 86
End: 2021-02-17
Payer: COMMERCIAL

## 2021-02-17 PROCEDURE — 0001A PR COVID VAC PFIZER DIL RECON 30 MCG/0.3 ML IM: CPT

## 2021-02-17 PROCEDURE — 91300 PR COVID VAC PFIZER DIL RECON 30 MCG/0.3 ML IM: CPT

## 2021-03-01 ENCOUNTER — OFFICE VISIT (OUTPATIENT)
Dept: CARDIOLOGY | Facility: CLINIC | Age: 86
End: 2021-03-01
Attending: PHYSICIAN ASSISTANT
Payer: COMMERCIAL

## 2021-03-01 VITALS
WEIGHT: 146 LBS | HEIGHT: 67 IN | HEART RATE: 59 BPM | DIASTOLIC BLOOD PRESSURE: 75 MMHG | SYSTOLIC BLOOD PRESSURE: 143 MMHG | BODY MASS INDEX: 22.91 KG/M2

## 2021-03-01 DIAGNOSIS — D50.9 IRON DEFICIENCY ANEMIA, UNSPECIFIED IRON DEFICIENCY ANEMIA TYPE: ICD-10-CM

## 2021-03-01 DIAGNOSIS — I50.31 ACUTE DIASTOLIC HEART FAILURE (H): ICD-10-CM

## 2021-03-01 LAB
BASOPHILS # BLD AUTO: 0.1 10E9/L (ref 0–0.2)
BASOPHILS NFR BLD AUTO: 1.6 %
DIFFERENTIAL METHOD BLD: ABNORMAL
EOSINOPHIL # BLD AUTO: 0.3 10E9/L (ref 0–0.7)
EOSINOPHIL NFR BLD AUTO: 9.1 %
ERYTHROCYTE [DISTWIDTH] IN BLOOD BY AUTOMATED COUNT: 18.1 % (ref 10–15)
FERRITIN SERPL-MCNC: 495 NG/ML (ref 26–388)
HCT VFR BLD AUTO: 32.3 % (ref 40–53)
HGB BLD-MCNC: 9.5 G/DL (ref 13.3–17.7)
IMM GRANULOCYTES # BLD: 0 10E9/L (ref 0–0.4)
IMM GRANULOCYTES NFR BLD: 0 %
IRON SATN MFR SERPL: 29 % (ref 15–46)
IRON SERPL-MCNC: 82 UG/DL (ref 35–180)
LYMPHOCYTES # BLD AUTO: 0.7 10E9/L (ref 0.8–5.3)
LYMPHOCYTES NFR BLD AUTO: 21.4 %
MCH RBC QN AUTO: 20.6 PG (ref 26.5–33)
MCHC RBC AUTO-ENTMCNC: 29.4 G/DL (ref 31.5–36.5)
MCV RBC AUTO: 70 FL (ref 78–100)
MONOCYTES # BLD AUTO: 0.3 10E9/L (ref 0–1.3)
MONOCYTES NFR BLD AUTO: 10.7 %
NEUTROPHILS # BLD AUTO: 1.8 10E9/L (ref 1.6–8.3)
NEUTROPHILS NFR BLD AUTO: 57.2 %
NRBC # BLD AUTO: 0 10*3/UL
NRBC BLD AUTO-RTO: 0 /100
PLATELET # BLD AUTO: 179 10E9/L (ref 150–450)
RBC # BLD AUTO: 4.61 10E12/L (ref 4.4–5.9)
TIBC SERPL-MCNC: 286 UG/DL (ref 240–430)
WBC # BLD AUTO: 3.2 10E9/L (ref 4–11)

## 2021-03-01 PROCEDURE — 99204 OFFICE O/P NEW MOD 45 MIN: CPT | Performed by: INTERNAL MEDICINE

## 2021-03-01 PROCEDURE — 36415 COLL VENOUS BLD VENIPUNCTURE: CPT | Performed by: PHYSICIAN ASSISTANT

## 2021-03-01 PROCEDURE — T1013 SIGN LANG/ORAL INTERPRETER: HCPCS | Mod: U3 | Performed by: INTERNAL MEDICINE

## 2021-03-01 PROCEDURE — 82728 ASSAY OF FERRITIN: CPT | Performed by: PHYSICIAN ASSISTANT

## 2021-03-01 PROCEDURE — 83550 IRON BINDING TEST: CPT | Performed by: PHYSICIAN ASSISTANT

## 2021-03-01 PROCEDURE — 85025 COMPLETE CBC W/AUTO DIFF WBC: CPT | Performed by: PHYSICIAN ASSISTANT

## 2021-03-01 PROCEDURE — 83540 ASSAY OF IRON: CPT | Performed by: PHYSICIAN ASSISTANT

## 2021-03-01 ASSESSMENT — MIFFLIN-ST. JEOR: SCORE: 1300.88

## 2021-03-01 NOTE — LETTER
3/1/2021    Dany Rodriguez MD, MD  [address] [place] [id number]    RE: Shiraz Ingram       Dear Colleague,    I had the pleasure of seeing Shiraz Ingram in the [facility].    CARDIOLOGY CLINIC CONSULTATION    REASON FOR CONSULT: CORE clinic consultation    PRIMARY CARDIOLOGIST: Dr. Field    PRIMARY CARE PHYSICIAN:  Dany Rodriguez MD    HISTORY OF PRESENT ILLNESS:  Mr. Ingram is a delightful 86-year-old gentleman who is seen today with the assistance of a professional  using sign language.  He is a patient of Dr. Field and has been referred to me by Taylor Rocha for possible consideration of cardiomems. His past medical history is significant for the followin.  Severe aortic stenosis status post TAVR 2020 with a 29 mm Medtronic Evolut Pro Plus valve.   2.  Complete heart block post TAVR with permanent pacemaker implanted.   3.  Coronary artery disease with history of CABG in .  This was a LIMA to LAD and saphenous vein graft to the OM as well as saphenous vein graft to the right PDA and the right PL and a Y graft.  He has a lesion of the right PDA at the touchdown of the graft.   4.  Heart failure with preserved EF with EF 50%-55%.   5.  Mild perivalvular leak around his aortic valve noted on subsequent echos.   6.  CKD.   7.  Hypertension.   8.  History of GI bleed.   9.  Asthma.   10. Chronic AFib, on anticoagulation, with history of left atrial appendage thrombus.      Unfortunately, Mr. Ingram has had a difficult year since his TAVR.  He had admissions for volume overload, dehydration and pneumonia.  His medications have been adjusted up and down multiple times due to these issues.  However more recently he has settled around 1.4 to 1.5 and his lisinopril was discontinued and his torsemide was cut back to 5 mg a day. His weight is stable around the 138-140 lb tomeka. He says his home BP has been running around 130  systolic on his current medical regimen. He can walk a block without stopping and climb one flight of stairs without any symptoms. He has no edema and reports no orthopnea PND. He seems NYHA class II.     PAST MEDICAL HISTORY:  Past Medical History:   Diagnosis Date     Allergic rhinitis, cause unspecified      Anemia, unspecified      Aortic stenosis     severe     Benign neoplasm of colon 1999    Ademonatous polyp     CKD (chronic kidney disease) stage 3, GFR 30-59 ml/min 05/12/2011     Coronary atherosclerosis of unspecified type of vessel, native or graft     s/p CABG 2002     Hyperlipidemia LDL goal < 100      Hypertension goal BP (blood pressure) < 140/90      Localized osteoarthrosis not specified whether primary or secondary, lower leg     left knee     Moderate persistent asthma      Pacemaker      Persistent atrial fibrillation (H)      Unspecified hearing loss        MEDICATIONS:  Current Outpatient Medications   Medication     albuterol (PROAIR HFA/PROVENTIL HFA/VENTOLIN HFA) 108 (90 Base) MCG/ACT inhaler     aspirin (ASA) 81 MG EC tablet     budesonide (PULMICORT) 0.5 MG/2ML nebulizer solution     calcium carbonate 600 mg-vitamin D 400 units (CALTRATE) 600-400 MG-UNIT per tablet     dabigatran ANTICOAGULANT (PRADAXA) 150 MG capsule     ferrous sulfate (IRON) 325 (65 Fe) MG tablet     hydrALAZINE (APRESOLINE) 25 MG tablet     ipratropium - albuterol 0.5 mg/2.5 mg/3 mL (DUONEB) 0.5-2.5 (3) MG/3ML nebulization     isosorbide mononitrate (IMDUR) 30 MG 24 hr tablet     magnesium chloride (MAG64) 535 (64 Mg) MG TBEC CR tablet     meclizine (ANTIVERT) 12.5 MG tablet     melatonin 3 MG tablet     metoprolol tartrate (LOPRESSOR) 25 MG tablet     montelukast (SINGULAIR) 10 MG tablet     multivitamin, therapeutic (THERA-VIT) TABS tablet     pantoprazole (PROTONIX) 40 MG EC tablet     simvastatin (ZOCOR) 40 MG tablet     torsemide (DEMADEX) 5 MG tablet     nitroGLYcerin (NITROSTAT) 0.4 MG sublingual tablet     No  current facility-administered medications for this visit.        ALLERGIES:  No Known Allergies    SOCIAL HISTORY:  I have reviewed this patient's social history and updated it with pertinent information if needed. Shiraz Ingram  reports that he has quit smoking. His smoking use included cigarettes. He has never used smokeless tobacco. He reports previous alcohol use. He reports that he does not use drugs.    FAMILY HISTORY:  I have reviewed this patient's family history and updated it with pertinent information if needed.   Family History   Problem Relation Age of Onset     C.A.D. Father      Cancer Mother         breast cancer,  of pneumonia     Cerebrovascular Disease Son      CABG Son      Family History Negative Child      Family History Negative Sister      Heart Disease Brother        REVIEW OF SYSTEMS:  Skin:  Negative     Eyes:  Positive for glasses  ENT:  Positive for deafness  Respiratory:  Negative    Cardiovascular:  Negative    Gastroenterology: Negative    Genitourinary:  Negative    Musculoskeletal:  Negative    Neurologic:  Negative    Psychiatric:  Negative    Heme/Lymph/Imm:  Negative    Endocrine:  Negative        PHYSICAL EXAM:      BP: (!) 143/75 Pulse: 59            Vital Signs with Ranges  Pulse:  [59] 59  BP: (143)/(75) 143/75  146 lbs 0 oz    Constitutional: awake, alert, no distress  Respiratory: Clear  Cardiovascular: Irregular. ESM. Normal JVP. No edema.   GI: nondistended  Neuropsychiatric: appropriate affact    DATA:  Labs: Pertinent cardiac labs reviewed    Echocardiogram: I have personally reviewed his echo    ASSESSMENT:  Aortic stenosis TAVR  PPM  HFPEF with severe diastolic dysfunction and marked VAUGHN with moderate PHTN  CKD  Anemia    RECOMMENDATIONS:  1. Mr Ingram has been referred to me for possible consideration of Cardiomems. At this time he is NYHA class II and he is on a small dose of Torsemide at 5 mg daily. He does have moderate PHTN although this is not surprising based  on the TAVR, HTN, LAE, and diastolic dysfunction he has. He is showing good clinical stability at this time and last 2 months have been better for him than ever he says. I would not recommend placing cardiomems at this time - mild additional risk of anticoagulation interruption and CKD, plus he is NYHA class II.   2. I have personally reviewed his echo. He does have mild-moderate LVH. One could consider doing a PYP scan for ATTR disease, however, I personally doubt he has 'clinically significant' amyloidosis at this time. His diastolic dysfunction LVH etc can be explained by HTN and aortic stenosis. If clinically worsens, this is something that can be considered.   3. I reviewed his PPM interrogation. He is 82%  and bradycardic overall. At this time I would change anything, but if he gets more symptomatic, could consider backing down BB at bit to allow for more stroke volume to see if it helps. At the same time, he is unlikely to tolerate AF with RVR well either.   4. BP in clinic is high, but home BP is fine.   5. Anemia correction may help.   6. In summary, I have not made any changes to his HF regimen. He is doing well and appears stable for the amount of comorbidities he has. Please let me know if I can be of future assistance.     EDGAR Miles, [facility]  Cardiology - [facility]  March 1, 2021    cc:   Amee Rocha, PA-C  [address]  [place] [id number]

## 2021-03-01 NOTE — PROGRESS NOTES
CARDIOLOGY CLINIC CONSULTATION    REASON FOR CONSULT: CORE clinic consultation    PRIMARY CARDIOLOGIST: Dr. Field    PRIMARY CARE PHYSICIAN:  Dany Rodriguez MD    HISTORY OF PRESENT ILLNESS:  Mr. Ingram is a delightful 86-year-old gentleman who is seen today with the assistance of a professional  using sign language.  He is a patient of Dr. Field and has been referred to me by Taylor Rocha for possible consideration of cardiomems. His past medical history is significant for the followin.  Severe aortic stenosis status post TAVR 2020 with a 29 mm Medtronic Evolut Pro Plus valve.   2.  Complete heart block post TAVR with permanent pacemaker implanted.   3.  Coronary artery disease with history of CABG in .  This was a LIMA to LAD and saphenous vein graft to the OM as well as saphenous vein graft to the right PDA and the right PL and a Y graft.  He has a lesion of the right PDA at the touchdown of the graft.   4.  Heart failure with preserved EF with EF 50%-55%.   5.  Mild perivalvular leak around his aortic valve noted on subsequent echos.   6.  CKD.   7.  Hypertension.   8.  History of GI bleed.   9.  Asthma.   10. Chronic AFib, on anticoagulation, with history of left atrial appendage thrombus.      Unfortunately, Mr. Ingram has had a difficult year since his TAVR.  He had admissions for volume overload, dehydration and pneumonia.  His medications have been adjusted up and down multiple times due to these issues.  However more recently he has settled around 1.4 to 1.5 and his lisinopril was discontinued and his torsemide was cut back to 5 mg a day. His weight is stable around the 138-140 lb tomeka. He says his home BP has been running around 130 systolic on his current medical regimen. He can walk a block without stopping and climb one flight of stairs without any symptoms. He has no edema and reports no orthopnea PND. He seems NYHA class II.     PAST MEDICAL HISTORY:  Past Medical History:    Diagnosis Date     Allergic rhinitis, cause unspecified      Anemia, unspecified      Aortic stenosis     severe     Benign neoplasm of colon 1999    Ademonatous polyp     CKD (chronic kidney disease) stage 3, GFR 30-59 ml/min 05/12/2011     Coronary atherosclerosis of unspecified type of vessel, native or graft     s/p CABG 2002     Hyperlipidemia LDL goal < 100      Hypertension goal BP (blood pressure) < 140/90      Localized osteoarthrosis not specified whether primary or secondary, lower leg     left knee     Moderate persistent asthma      Pacemaker      Persistent atrial fibrillation (H)      Unspecified hearing loss        MEDICATIONS:  Current Outpatient Medications   Medication     albuterol (PROAIR HFA/PROVENTIL HFA/VENTOLIN HFA) 108 (90 Base) MCG/ACT inhaler     aspirin (ASA) 81 MG EC tablet     budesonide (PULMICORT) 0.5 MG/2ML nebulizer solution     calcium carbonate 600 mg-vitamin D 400 units (CALTRATE) 600-400 MG-UNIT per tablet     dabigatran ANTICOAGULANT (PRADAXA) 150 MG capsule     ferrous sulfate (IRON) 325 (65 Fe) MG tablet     hydrALAZINE (APRESOLINE) 25 MG tablet     ipratropium - albuterol 0.5 mg/2.5 mg/3 mL (DUONEB) 0.5-2.5 (3) MG/3ML nebulization     isosorbide mononitrate (IMDUR) 30 MG 24 hr tablet     magnesium chloride (MAG64) 535 (64 Mg) MG TBEC CR tablet     meclizine (ANTIVERT) 12.5 MG tablet     melatonin 3 MG tablet     metoprolol tartrate (LOPRESSOR) 25 MG tablet     montelukast (SINGULAIR) 10 MG tablet     multivitamin, therapeutic (THERA-VIT) TABS tablet     pantoprazole (PROTONIX) 40 MG EC tablet     simvastatin (ZOCOR) 40 MG tablet     torsemide (DEMADEX) 5 MG tablet     nitroGLYcerin (NITROSTAT) 0.4 MG sublingual tablet     No current facility-administered medications for this visit.        ALLERGIES:  No Known Allergies    SOCIAL HISTORY:  I have reviewed this patient's social history and updated it with pertinent information if needed. Shiraz Ingram  reports that he has  quit smoking. His smoking use included cigarettes. He has never used smokeless tobacco. He reports previous alcohol use. He reports that he does not use drugs.    FAMILY HISTORY:  I have reviewed this patient's family history and updated it with pertinent information if needed.   Family History   Problem Relation Age of Onset     C.A.D. Father      Cancer Mother         breast cancer,  of pneumonia     Cerebrovascular Disease Son      CABG Son      Family History Negative Child      Family History Negative Sister      Heart Disease Brother        REVIEW OF SYSTEMS:  Skin:  Negative     Eyes:  Positive for glasses  ENT:  Positive for deafness  Respiratory:  Negative    Cardiovascular:  Negative    Gastroenterology: Negative    Genitourinary:  Negative    Musculoskeletal:  Negative    Neurologic:  Negative    Psychiatric:  Negative    Heme/Lymph/Imm:  Negative    Endocrine:  Negative        PHYSICAL EXAM:      BP: (!) 143/75 Pulse: 59            Vital Signs with Ranges  Pulse:  [59] 59  BP: (143)/(75) 143/75  146 lbs 0 oz    Constitutional: awake, alert, no distress  Respiratory: Clear  Cardiovascular: Irregular. ESM. Normal JVP. No edema.   GI: nondistended  Neuropsychiatric: appropriate affact    DATA:  Labs: Pertinent cardiac labs reviewed    Echocardiogram: I have personally reviewed his echo    ASSESSMENT:  Aortic stenosis TAVR  PPM  HFPEF with severe diastolic dysfunction and marked VAUGHN with moderate PHTN  CKD  Anemia    RECOMMENDATIONS:  1. Mr Ingram has been referred to me for possible consideration of Cardiomems. At this time he is NYHA class II and he is on a small dose of Torsemide at 5 mg daily. He does have moderate PHTN although this is not surprising based on the TAVR, HTN, LAE, and diastolic dysfunction he has. He is showing good clinical stability at this time and last 2 months have been better for him than ever he says. I would not recommend placing cardiomems at this time - mild additional risk  of anticoagulation interruption and CKD, plus he is NYHA class II.   2. I have personally reviewed his echo. He does have mild-moderate LVH. One could consider doing a PYP scan for ATTR disease, however, I personally doubt he has 'clinically significant' amyloidosis at this time. His diastolic dysfunction LVH etc can be explained by HTN and aortic stenosis. If clinically worsens, this is something that can be considered.   3. I reviewed his PPM interrogation. He is 82%  and bradycardic overall. At this time I would change anything, but if he gets more symptomatic, could consider backing down BB at bit to allow for more stroke volume to see if it helps. At the same time, he is unlikely to tolerate AF with RVR well either.   4. BP in clinic is high, but home BP is fine.   5. Anemia correction may help.   6. In summary, I have not made any changes to his HF regimen. He is doing well and appears stable for the amount of comorbidities he has. Please let me know if I can be of future assistance.     EDGAR Miles, [facility]  Cardiology - [facility]  March 1, 2021

## 2021-03-02 NOTE — RESULT ENCOUNTER NOTE
Dear Mr. Ingram,    Your blood tests reveal few abnormalities. WBC is mildly low. Hemoglobin is low but stable at 9.5. Platelet is normal. Will continue to monitor it. Please, have another CBC in 3 months.    Please, call me with any questions.    Joon Morris MD

## 2021-03-03 ENCOUNTER — TELEPHONE (OUTPATIENT)
Dept: ONCOLOGY | Facility: CLINIC | Age: 86
End: 2021-03-03

## 2021-03-03 NOTE — TELEPHONE ENCOUNTER
M for son , Forrest, to call for scheduling for Shiraz Ingram.        ----- Message from Cecy Mast RN sent at 3/3/2021  9:46 AM CST -----  Hi Team ,    Patient needs a repeat CBC p-d and virtual or in person office visit with Dr. Morris in 3 months    THank you,  Awilda

## 2021-03-10 ENCOUNTER — IMMUNIZATION (OUTPATIENT)
Dept: NURSING | Facility: CLINIC | Age: 86
End: 2021-03-10
Attending: FAMILY MEDICINE
Payer: COMMERCIAL

## 2021-03-10 PROCEDURE — 0002A PR COVID VAC PFIZER DIL RECON 30 MCG/0.3 ML IM: CPT

## 2021-03-10 PROCEDURE — T1013 SIGN LANG/ORAL INTERPRETER: HCPCS | Mod: U3

## 2021-03-10 PROCEDURE — 91300 PR COVID VAC PFIZER DIL RECON 30 MCG/0.3 ML IM: CPT

## 2021-03-15 ENCOUNTER — HOSPITAL ENCOUNTER (INPATIENT)
Facility: CLINIC | Age: 86
LOS: 3 days | Discharge: HOME | DRG: 871 | End: 2021-03-18
Attending: EMERGENCY MEDICINE | Admitting: INTERNAL MEDICINE
Payer: COMMERCIAL

## 2021-03-15 ENCOUNTER — APPOINTMENT (OUTPATIENT)
Dept: GENERAL RADIOLOGY | Facility: CLINIC | Age: 86
DRG: 871 | End: 2021-03-15
Attending: EMERGENCY MEDICINE
Payer: COMMERCIAL

## 2021-03-15 DIAGNOSIS — R09.02 HYPOXIA: ICD-10-CM

## 2021-03-15 DIAGNOSIS — J18.9 PNEUMONIA OF RIGHT LUNG DUE TO INFECTIOUS ORGANISM, UNSPECIFIED PART OF LUNG: ICD-10-CM

## 2021-03-15 DIAGNOSIS — J18.9 PNEUMONIA DUE TO INFECTIOUS ORGANISM, UNSPECIFIED LATERALITY, UNSPECIFIED PART OF LUNG: Primary | ICD-10-CM

## 2021-03-15 PROBLEM — I44.2 COMPLETE HEART BLOCK (H): Status: ACTIVE | Noted: 2020-07-14

## 2021-03-15 PROBLEM — Z95.1 S/P CABG (CORONARY ARTERY BYPASS GRAFT): Status: ACTIVE | Noted: 2020-04-23

## 2021-03-15 PROBLEM — I48.91 ATRIAL FIBRILLATION (H): Status: ACTIVE | Noted: 2020-07-13

## 2021-03-15 PROBLEM — J96.01 ACUTE RESPIRATORY FAILURE WITH HYPOXIA (H): Status: ACTIVE | Noted: 2021-03-15

## 2021-03-15 PROBLEM — I50.9 ACUTE EXACERBATION OF CHF (CONGESTIVE HEART FAILURE) (H): Status: ACTIVE | Noted: 2020-07-28

## 2021-03-15 PROBLEM — I35.0 SEVERE AORTIC STENOSIS: Status: ACTIVE | Noted: 2020-04-23

## 2021-03-15 PROBLEM — I51.3 THROMBUS OF LEFT ATRIAL APPENDAGE: Status: ACTIVE | Noted: 2020-07-13

## 2021-03-15 PROBLEM — Z95.2 HISTORY OF TRANSCATHETER AORTIC VALVE REPLACEMENT (TAVR): Status: ACTIVE | Noted: 2020-10-27

## 2021-03-15 LAB
ABO + RH BLD: NORMAL
ABO + RH BLD: NORMAL
ALBUMIN SERPL-MCNC: 3.7 G/DL (ref 3.4–5)
ALP SERPL-CCNC: 69 U/L (ref 40–150)
ALT SERPL W P-5'-P-CCNC: 38 U/L (ref 0–70)
ANION GAP SERPL CALCULATED.3IONS-SCNC: 6 MMOL/L (ref 3–14)
AST SERPL W P-5'-P-CCNC: 25 U/L (ref 0–45)
BASE EXCESS BLDV CALC-SCNC: 3.2 MMOL/L
BASOPHILS # BLD AUTO: 0 10E9/L (ref 0–0.2)
BASOPHILS NFR BLD AUTO: 0.4 %
BILIRUB SERPL-MCNC: 1 MG/DL (ref 0.2–1.3)
BLD GP AB SCN SERPL QL: NORMAL
BLOOD BANK CMNT PATIENT-IMP: NORMAL
BUN SERPL-MCNC: 62 MG/DL (ref 7–30)
CALCIUM SERPL-MCNC: 9.5 MG/DL (ref 8.5–10.1)
CHLORIDE SERPL-SCNC: 104 MMOL/L (ref 94–109)
CO2 SERPL-SCNC: 27 MMOL/L (ref 20–32)
CREAT SERPL-MCNC: 1.83 MG/DL (ref 0.66–1.25)
CRP SERPL-MCNC: 32.9 MG/L (ref 0–8)
DIFFERENTIAL METHOD BLD: ABNORMAL
EOSINOPHIL # BLD AUTO: 0 10E9/L (ref 0–0.7)
EOSINOPHIL NFR BLD AUTO: 0.4 %
ERYTHROCYTE [DISTWIDTH] IN BLOOD BY AUTOMATED COUNT: 16.6 % (ref 10–15)
ERYTHROCYTE [SEDIMENTATION RATE] IN BLOOD BY WESTERGREN METHOD: 65 MM/H (ref 0–20)
FLUAV RNA RESP QL NAA+PROBE: NEGATIVE
FLUBV RNA RESP QL NAA+PROBE: NEGATIVE
GFR SERPL CREATININE-BSD FRML MDRD: 33 ML/MIN/{1.73_M2}
GLUCOSE BLDC GLUCOMTR-MCNC: 161 MG/DL (ref 70–99)
GLUCOSE SERPL-MCNC: 102 MG/DL (ref 70–99)
HCO3 BLDV-SCNC: 29 MMOL/L (ref 21–28)
HCT VFR BLD AUTO: 35.1 % (ref 40–53)
HGB BLD-MCNC: 10.6 G/DL (ref 13.3–17.7)
IMM GRANULOCYTES # BLD: 0 10E9/L (ref 0–0.4)
IMM GRANULOCYTES NFR BLD: 0.2 %
INR PPP: 1.51 (ref 0.86–1.14)
LABORATORY COMMENT REPORT: NORMAL
LACTATE BLD-SCNC: 1.6 MMOL/L (ref 0.7–2)
LYMPHOCYTES # BLD AUTO: 0.3 10E9/L (ref 0.8–5.3)
LYMPHOCYTES NFR BLD AUTO: 5.7 %
MCH RBC QN AUTO: 20.5 PG (ref 26.5–33)
MCHC RBC AUTO-ENTMCNC: 30.2 G/DL (ref 31.5–36.5)
MCV RBC AUTO: 68 FL (ref 78–100)
MONOCYTES # BLD AUTO: 0.4 10E9/L (ref 0–1.3)
MONOCYTES NFR BLD AUTO: 9.6 %
NEUTROPHILS # BLD AUTO: 3.8 10E9/L (ref 1.6–8.3)
NEUTROPHILS NFR BLD AUTO: 83.7 %
NRBC # BLD AUTO: 0 10*3/UL
NRBC BLD AUTO-RTO: 0 /100
NT-PROBNP SERPL-MCNC: 4702 PG/ML (ref 0–1800)
O2/TOTAL GAS SETTING VFR VENT: ABNORMAL %
OXYHGB MFR BLDV: 63 %
PCO2 BLDV: 49 MM HG (ref 40–50)
PH BLDV: 7.38 PH (ref 7.32–7.43)
PLATELET # BLD AUTO: 136 10E9/L (ref 150–450)
PO2 BLDV: 37 MM HG (ref 25–47)
POTASSIUM SERPL-SCNC: 4.4 MMOL/L (ref 3.4–5.3)
PROCALCITONIN SERPL-MCNC: 5.32 NG/ML
PROT SERPL-MCNC: 7.6 G/DL (ref 6.8–8.8)
RBC # BLD AUTO: 5.18 10E12/L (ref 4.4–5.9)
RSV RNA SPEC QL NAA+PROBE: NORMAL
S PNEUM AG SPEC QL: NORMAL
SARS-COV-2 RNA RESP QL NAA+PROBE: NEGATIVE
SODIUM SERPL-SCNC: 137 MMOL/L (ref 133–144)
SPECIMEN EXP DATE BLD: NORMAL
SPECIMEN SOURCE: NORMAL
SPECIMEN SOURCE: NORMAL
TROPONIN I SERPL-MCNC: <0.015 UG/L (ref 0–0.04)
TSH SERPL DL<=0.005 MIU/L-ACNC: 1.68 MU/L (ref 0.4–4)
VIT B12 SERPL-MCNC: 413 PG/ML (ref 193–986)
WBC # BLD AUTO: 4.6 10E9/L (ref 4–11)

## 2021-03-15 PROCEDURE — 85652 RBC SED RATE AUTOMATED: CPT | Performed by: EMERGENCY MEDICINE

## 2021-03-15 PROCEDURE — 85025 COMPLETE CBC W/AUTO DIFF WBC: CPT | Performed by: EMERGENCY MEDICINE

## 2021-03-15 PROCEDURE — 86900 BLOOD TYPING SEROLOGIC ABO: CPT | Performed by: EMERGENCY MEDICINE

## 2021-03-15 PROCEDURE — 84484 ASSAY OF TROPONIN QUANT: CPT | Performed by: EMERGENCY MEDICINE

## 2021-03-15 PROCEDURE — 96365 THER/PROPH/DIAG IV INF INIT: CPT

## 2021-03-15 PROCEDURE — 71045 X-RAY EXAM CHEST 1 VIEW: CPT

## 2021-03-15 PROCEDURE — 210N000001 HC R&B IMCU HEART CARE

## 2021-03-15 PROCEDURE — 80053 COMPREHEN METABOLIC PANEL: CPT | Performed by: EMERGENCY MEDICINE

## 2021-03-15 PROCEDURE — C9803 HOPD COVID-19 SPEC COLLECT: HCPCS

## 2021-03-15 PROCEDURE — 86140 C-REACTIVE PROTEIN: CPT | Performed by: EMERGENCY MEDICINE

## 2021-03-15 PROCEDURE — 86901 BLOOD TYPING SEROLOGIC RH(D): CPT | Performed by: EMERGENCY MEDICINE

## 2021-03-15 PROCEDURE — 250N000009 HC RX 250: Performed by: INTERNAL MEDICINE

## 2021-03-15 PROCEDURE — 82607 VITAMIN B-12: CPT | Performed by: EMERGENCY MEDICINE

## 2021-03-15 PROCEDURE — 93005 ELECTROCARDIOGRAM TRACING: CPT

## 2021-03-15 PROCEDURE — 210N000002 HC R&B HEART CARE

## 2021-03-15 PROCEDURE — 250N000011 HC RX IP 250 OP 636: Performed by: INTERNAL MEDICINE

## 2021-03-15 PROCEDURE — 86850 RBC ANTIBODY SCREEN: CPT | Performed by: EMERGENCY MEDICINE

## 2021-03-15 PROCEDURE — 82805 BLOOD GASES W/O2 SATURATION: CPT | Performed by: EMERGENCY MEDICINE

## 2021-03-15 PROCEDURE — 250N000011 HC RX IP 250 OP 636: Performed by: EMERGENCY MEDICINE

## 2021-03-15 PROCEDURE — 87636 SARSCOV2 & INF A&B AMP PRB: CPT | Performed by: EMERGENCY MEDICINE

## 2021-03-15 PROCEDURE — 250N000013 HC RX MED GY IP 250 OP 250 PS 637: Performed by: INTERNAL MEDICINE

## 2021-03-15 PROCEDURE — 99223 1ST HOSP IP/OBS HIGH 75: CPT | Mod: AI | Performed by: INTERNAL MEDICINE

## 2021-03-15 PROCEDURE — 83605 ASSAY OF LACTIC ACID: CPT | Performed by: EMERGENCY MEDICINE

## 2021-03-15 PROCEDURE — 87040 BLOOD CULTURE FOR BACTERIA: CPT | Performed by: EMERGENCY MEDICINE

## 2021-03-15 PROCEDURE — 87899 AGENT NOS ASSAY W/OPTIC: CPT | Performed by: INTERNAL MEDICINE

## 2021-03-15 PROCEDURE — 83880 ASSAY OF NATRIURETIC PEPTIDE: CPT | Performed by: EMERGENCY MEDICINE

## 2021-03-15 PROCEDURE — 999N001017 HC STATISTIC GLUCOSE BY METER IP

## 2021-03-15 PROCEDURE — 84145 PROCALCITONIN (PCT): CPT | Performed by: EMERGENCY MEDICINE

## 2021-03-15 PROCEDURE — 85610 PROTHROMBIN TIME: CPT | Performed by: EMERGENCY MEDICINE

## 2021-03-15 PROCEDURE — 94640 AIRWAY INHALATION TREATMENT: CPT

## 2021-03-15 PROCEDURE — 36415 COLL VENOUS BLD VENIPUNCTURE: CPT

## 2021-03-15 PROCEDURE — 99291 CRITICAL CARE FIRST HOUR: CPT | Mod: 25

## 2021-03-15 PROCEDURE — 84443 ASSAY THYROID STIM HORMONE: CPT | Performed by: EMERGENCY MEDICINE

## 2021-03-15 PROCEDURE — 99292 CRITICAL CARE ADDL 30 MIN: CPT

## 2021-03-15 RX ORDER — DOXYCYCLINE 100 MG/10ML
100 INJECTION, POWDER, LYOPHILIZED, FOR SOLUTION INTRAVENOUS EVERY 12 HOURS
Status: DISCONTINUED | OUTPATIENT
Start: 2021-03-15 | End: 2021-03-17

## 2021-03-15 RX ORDER — MULTIVITAMIN,THERAPEUTIC
1 TABLET ORAL EVERY EVENING
Status: DISCONTINUED | OUTPATIENT
Start: 2021-03-15 | End: 2021-03-18 | Stop reason: HOSPADM

## 2021-03-15 RX ORDER — ACETAMINOPHEN 650 MG/1
650 SUPPOSITORY RECTAL EVERY 4 HOURS PRN
Status: DISCONTINUED | OUTPATIENT
Start: 2021-03-15 | End: 2021-03-18 | Stop reason: HOSPADM

## 2021-03-15 RX ORDER — LIDOCAINE 40 MG/G
CREAM TOPICAL
Status: DISCONTINUED | OUTPATIENT
Start: 2021-03-15 | End: 2021-03-15

## 2021-03-15 RX ORDER — IPRATROPIUM BROMIDE AND ALBUTEROL SULFATE 2.5; .5 MG/3ML; MG/3ML
3 SOLUTION RESPIRATORY (INHALATION) EVERY 4 HOURS
Status: DISCONTINUED | OUTPATIENT
Start: 2021-03-15 | End: 2021-03-18 | Stop reason: HOSPADM

## 2021-03-15 RX ORDER — ISOSORBIDE MONONITRATE 30 MG/1
30 TABLET, EXTENDED RELEASE ORAL DAILY
Status: DISCONTINUED | OUTPATIENT
Start: 2021-03-16 | End: 2021-03-18 | Stop reason: HOSPADM

## 2021-03-15 RX ORDER — AMOXICILLIN 250 MG
2 CAPSULE ORAL 2 TIMES DAILY PRN
Status: DISCONTINUED | OUTPATIENT
Start: 2021-03-15 | End: 2021-03-18 | Stop reason: HOSPADM

## 2021-03-15 RX ORDER — ACETAMINOPHEN 325 MG/1
650 TABLET ORAL EVERY 4 HOURS PRN
Status: DISCONTINUED | OUTPATIENT
Start: 2021-03-15 | End: 2021-03-17

## 2021-03-15 RX ORDER — PIPERACILLIN SODIUM, TAZOBACTAM SODIUM 2; .25 G/10ML; G/10ML
2.25 INJECTION, POWDER, LYOPHILIZED, FOR SOLUTION INTRAVENOUS EVERY 6 HOURS
Status: DISCONTINUED | OUTPATIENT
Start: 2021-03-15 | End: 2021-03-17

## 2021-03-15 RX ORDER — FERROUS SULFATE 325(65) MG
325 TABLET ORAL 2 TIMES DAILY
Status: DISCONTINUED | OUTPATIENT
Start: 2021-03-15 | End: 2021-03-16

## 2021-03-15 RX ORDER — AMOXICILLIN 250 MG
1 CAPSULE ORAL 2 TIMES DAILY PRN
Status: DISCONTINUED | OUTPATIENT
Start: 2021-03-15 | End: 2021-03-18 | Stop reason: HOSPADM

## 2021-03-15 RX ORDER — BUDESONIDE 0.5 MG/2ML
0.5 INHALANT ORAL 2 TIMES DAILY
Status: DISCONTINUED | OUTPATIENT
Start: 2021-03-15 | End: 2021-03-18 | Stop reason: HOSPADM

## 2021-03-15 RX ORDER — ASPIRIN 81 MG/1
81 TABLET ORAL DAILY
Status: DISCONTINUED | OUTPATIENT
Start: 2021-03-16 | End: 2021-03-18 | Stop reason: HOSPADM

## 2021-03-15 RX ORDER — LIDOCAINE 40 MG/G
CREAM TOPICAL
Status: DISCONTINUED | OUTPATIENT
Start: 2021-03-15 | End: 2021-03-18 | Stop reason: HOSPADM

## 2021-03-15 RX ORDER — ONDANSETRON 4 MG/1
4 TABLET, ORALLY DISINTEGRATING ORAL EVERY 6 HOURS PRN
Status: DISCONTINUED | OUTPATIENT
Start: 2021-03-15 | End: 2021-03-18 | Stop reason: HOSPADM

## 2021-03-15 RX ORDER — BISACODYL 10 MG
10 SUPPOSITORY, RECTAL RECTAL DAILY PRN
Status: DISCONTINUED | OUTPATIENT
Start: 2021-03-15 | End: 2021-03-17

## 2021-03-15 RX ORDER — PANTOPRAZOLE SODIUM 40 MG/1
40 TABLET, DELAYED RELEASE ORAL
Status: DISCONTINUED | OUTPATIENT
Start: 2021-03-15 | End: 2021-03-18 | Stop reason: HOSPADM

## 2021-03-15 RX ORDER — SIMVASTATIN 40 MG
40 TABLET ORAL AT BEDTIME
Status: DISCONTINUED | OUTPATIENT
Start: 2021-03-16 | End: 2021-03-18 | Stop reason: HOSPADM

## 2021-03-15 RX ORDER — DABIGATRAN ETEXILATE 75 MG/1
75 CAPSULE ORAL 2 TIMES DAILY
Status: DISCONTINUED | OUTPATIENT
Start: 2021-03-15 | End: 2021-03-16

## 2021-03-15 RX ORDER — PIPERACILLIN SODIUM, TAZOBACTAM SODIUM 3; .375 G/15ML; G/15ML
3.38 INJECTION, POWDER, LYOPHILIZED, FOR SOLUTION INTRAVENOUS ONCE
Status: COMPLETED | OUTPATIENT
Start: 2021-03-15 | End: 2021-03-15

## 2021-03-15 RX ORDER — POLYETHYLENE GLYCOL 3350 17 G/17G
17 POWDER, FOR SOLUTION ORAL DAILY PRN
Status: DISCONTINUED | OUTPATIENT
Start: 2021-03-15 | End: 2021-03-18 | Stop reason: HOSPADM

## 2021-03-15 RX ORDER — MONTELUKAST SODIUM 10 MG/1
10 TABLET ORAL DAILY
Status: DISCONTINUED | OUTPATIENT
Start: 2021-03-16 | End: 2021-03-18 | Stop reason: HOSPADM

## 2021-03-15 RX ORDER — TORSEMIDE 5 MG/1
5 TABLET ORAL DAILY
Status: DISCONTINUED | OUTPATIENT
Start: 2021-03-15 | End: 2021-03-18 | Stop reason: HOSPADM

## 2021-03-15 RX ORDER — NITROGLYCERIN 0.4 MG/1
0.4 TABLET SUBLINGUAL EVERY 5 MIN PRN
Status: DISCONTINUED | OUTPATIENT
Start: 2021-03-15 | End: 2021-03-18 | Stop reason: HOSPADM

## 2021-03-15 RX ORDER — ALBUTEROL SULFATE 90 UG/1
2 AEROSOL, METERED RESPIRATORY (INHALATION) EVERY 4 HOURS PRN
Status: DISCONTINUED | OUTPATIENT
Start: 2021-03-15 | End: 2021-03-18 | Stop reason: HOSPADM

## 2021-03-15 RX ORDER — PROCHLORPERAZINE MALEATE 5 MG
5 TABLET ORAL EVERY 6 HOURS PRN
Status: DISCONTINUED | OUTPATIENT
Start: 2021-03-15 | End: 2021-03-18 | Stop reason: HOSPADM

## 2021-03-15 RX ORDER — PROCHLORPERAZINE 25 MG
12.5 SUPPOSITORY, RECTAL RECTAL EVERY 12 HOURS PRN
Status: DISCONTINUED | OUTPATIENT
Start: 2021-03-15 | End: 2021-03-18 | Stop reason: HOSPADM

## 2021-03-15 RX ORDER — METOPROLOL TARTRATE 25 MG/1
25 TABLET, FILM COATED ORAL 2 TIMES DAILY
Status: DISCONTINUED | OUTPATIENT
Start: 2021-03-15 | End: 2021-03-18 | Stop reason: HOSPADM

## 2021-03-15 RX ORDER — ONDANSETRON 2 MG/ML
4 INJECTION INTRAMUSCULAR; INTRAVENOUS EVERY 6 HOURS PRN
Status: DISCONTINUED | OUTPATIENT
Start: 2021-03-15 | End: 2021-03-18 | Stop reason: HOSPADM

## 2021-03-15 RX ADMIN — BUDESONIDE 0.5 MG: 0.5 INHALANT RESPIRATORY (INHALATION) at 20:11

## 2021-03-15 RX ADMIN — PIPERACILLIN SODIUM AND TAZOBACTAM SODIUM 3.38 G: 3; .375 INJECTION, POWDER, LYOPHILIZED, FOR SOLUTION INTRAVENOUS at 13:49

## 2021-03-15 RX ADMIN — FERROUS SULFATE TAB 325 MG (65 MG ELEMENTAL FE) 325 MG: 325 (65 FE) TAB at 20:04

## 2021-03-15 RX ADMIN — IPRATROPIUM BROMIDE AND ALBUTEROL SULFATE 3 ML: .5; 3 SOLUTION RESPIRATORY (INHALATION) at 22:16

## 2021-03-15 RX ADMIN — THERA TABS 1 TABLET: TAB at 20:04

## 2021-03-15 RX ADMIN — DOXYCYCLINE 100 MG: 100 INJECTION, POWDER, LYOPHILIZED, FOR SOLUTION INTRAVENOUS at 16:53

## 2021-03-15 RX ADMIN — TORSEMIDE 5 MG: 5 TABLET ORAL at 17:29

## 2021-03-15 RX ADMIN — PANTOPRAZOLE SODIUM 40 MG: 40 TABLET, DELAYED RELEASE ORAL at 16:50

## 2021-03-15 RX ADMIN — HYDRALAZINE HYDROCHLORIDE 12.5 MG: 25 TABLET ORAL at 17:29

## 2021-03-15 RX ADMIN — PIPERACILLIN AND TAZOBACTAM 2.25 G: 2; .25 INJECTION, POWDER, LYOPHILIZED, FOR SOLUTION INTRAVENOUS at 20:11

## 2021-03-15 RX ADMIN — HYDRALAZINE HYDROCHLORIDE 12.5 MG: 25 TABLET ORAL at 22:16

## 2021-03-15 RX ADMIN — IPRATROPIUM BROMIDE AND ALBUTEROL SULFATE 3 ML: .5; 3 SOLUTION RESPIRATORY (INHALATION) at 20:11

## 2021-03-15 RX ADMIN — IPRATROPIUM BROMIDE AND ALBUTEROL SULFATE 3 ML: .5; 3 SOLUTION RESPIRATORY (INHALATION) at 16:28

## 2021-03-15 RX ADMIN — DABIGATRAN ETEXILATE MESYLATE 75 MG: 75 CAPSULE ORAL at 20:04

## 2021-03-15 RX ADMIN — METOPROLOL TARTRATE 25 MG: 25 TABLET, FILM COATED ORAL at 20:04

## 2021-03-15 ASSESSMENT — ACTIVITIES OF DAILY LIVING (ADL)
COMMUNICATION_MANAGEMENT: ASL
DIFFICULTY_COMMUNICATING: YES
COMMUNICATION: OTHER (SEE COMMENTS)
DRESSING/BATHING_DIFFICULTY: NO
DIFFICULTY_EATING/SWALLOWING: NO
TOILETING_ISSUES: NO

## 2021-03-15 ASSESSMENT — ENCOUNTER SYMPTOMS
CHILLS: 1
SHORTNESS OF BREATH: 1
FEVER: 0
COUGH: 1

## 2021-03-15 ASSESSMENT — MIFFLIN-ST. JEOR: SCORE: 1255.07

## 2021-03-15 NOTE — ED NOTES
[facility]  ED Nurse Handoff Report    ED Chief complaint: Cough and Hematemesis      ED Diagnosis:   Final diagnoses:   None       Code Status: Confirm with hospitalist    Allergies: No Known Allergies    Patient Story: Pt from home where he lives with his son, pt needs . Last noc at 0200 pt woke with cough, chills/rigors and shortness of breath. Today pt starting coughing up small amount of blood.    Focused Assessment:  Son reports pt has history of CHF and pneumonia in the past with similar symptoms. On arrival sats were 79% RA. Currently on 10L/min per non-rebreather with sats > 92%. Denies chest pain. Cough intermittent and currently not productive.     Treatments and/or interventions provided: Labs, CXR, medications, monitoring  Labs Ordered and Resulted from Time of ED Arrival Up to the Time of Departure from the ED   CBC WITH PLATELETS DIFFERENTIAL - Abnormal; Notable for the following components:       Result Value    Hemoglobin 10.6 (*)     Hematocrit 35.1 (*)     MCV 68 (*)     MCH 20.5 (*)     MCHC 30.2 (*)     RDW 16.6 (*)     Platelet Count 136 (*)     Absolute Lymphocytes 0.3 (*)     All other components within normal limits   INR - Abnormal; Notable for the following components:    INR 1.51 (*)     All other components within normal limits   COMPREHENSIVE METABOLIC PANEL - Abnormal; Notable for the following components:    Glucose 102 (*)     Urea Nitrogen 62 (*)     Creatinine 1.83 (*)     GFR Estimate 33 (*)     GFR Estimate If Black 38 (*)     All other components within normal limits   NT PROBNP INPATIENT - Abnormal; Notable for the following components:    N-Terminal Pro BNP Inpatient 4,702 (*)     All other components within normal limits   CRP INFLAMMATION - Abnormal; Notable for the following components:    CRP Inflammation 32.9 (*)     All other components within normal limits   ERYTHROCYTE SEDIMENTATION RATE AUTO - Abnormal; Notable for the following  "components:    Sed Rate 65 (*)     All other components within normal limits   BLOOD GAS VENOUS AND OXYHGB - Abnormal; Notable for the following components:    Bicarbonate Venous 29 (*)     All other components within normal limits   TROPONIN I   LACTIC ACID WHOLE BLOOD   INFLUENZA A/B & SARS-COV2 PCR MULTIPLEX   PROCALCITONIN   PERIPHERAL IV CATHETER   ABO/RH TYPE AND SCREEN   BLOOD CULTURE   BLOOD CULTURE     XR Chest Port 1 View   Final Result   IMPRESSION: Portable chest. There is new air space consolidation noted   in the right lung concerning for pneumonia. Left lung remains clear.   No pneumothorax or significant pleural fluid. Heart is normal in size.   Prior CABG and transcatheter aortic valve replacement. Implantable   cardiac device and single lead is unchanged in position.      ERASTO LATHAM MD          Patient's response to treatments and/or interventions: Tolerated well    To be done/followed up on inpatient unit:  Continue plan of care    Does this patient have any cognitive concerns?: none noted    Activity level - Baseline/Home:  Independent  Activity Level - Current:   Stand with Assist    Patient's Preferred language: American Sign Language   Needed?: Yes    Isolation: None and COVID r/o and special precautions, waiting for results, received 2nd Dydra Covid vaccine last week  Infection: Not Applicable  COVID r/o and special precautions  Patient tested for COVID 19 prior to admission: YES  Bariatric?: No    Vital Signs:   Vitals:    03/15/21 1244 03/15/21 1300 03/15/21 1315 03/15/21 1345   BP: 100/47 97/59 100/59 108/62   Pulse: 106 87 101 89   Resp: 24 29 29 26   Temp: 99.5  F (37.5  C)      TempSrc: Oral      SpO2: (!) 72% 98% 97% 99%   Weight: 61.6 kg (135 lb 14.4 oz)      Height: 1.702 m (5' 7\")          Cardiac Rhythm:     Was the PSS-3 completed:   Yes  What interventions are required if any?               Family Comments: Son at bedside  OBS brochure/video discussed/provided to " patient/family: N/A              Name of person given brochure if not patient: n/a              Relationship to patient: n/a    For the majority of the shift this patient's behavior was Green.   Behavioral interventions performed were Rounding.    ED NURSE PHONE NUMBER: *[phone]

## 2021-03-15 NOTE — H&P
[facility]    History and Physical  Hospitalist       Date of Admission:  3/15/2021    Assessment & Plan   Shiraz Ingram is a 86 year old male complex past medical history including coronary disease status post CABG 2002, moderate persistent asthma followed by [facility], anemia, hypertension, dyslipidemia, CKD stage III with baseline creatinine of around 1.5-2, history of aortic stenosis status post TAVR, TAVR complicated by complete heart block requiring pacemaker placement December 2020, B12 deficiency, iron deficiency anemia, history of heart failure with preserved ejection fraction with dry weight 138 pounds to 140 pound who presents with new onset cough shortness of breath fevers and chills.  Found to have right-sided pneumonia and likely left lower pneumonia with acute hypoxic respiratory failure.  Covid negative.  Patient has had vaccinations.  Low suspicion for Covid.    Principal Problem:    Pneumonia due to infectious vcydtrbf-[id number] pneumonia  Acute respiratory failure with hypoxia (H)    Assessment:  -Has been doing well lately.  Underlying moderate persistent asthma on controller inhalers.  -Presents with acute onset of fevers rigors chills cough and shortness of breath at 2:00 this morning.    -Significant oxygen on admission with sats in the 70s on admission.  Readily treated with facemasks oxygen and feeling significantly better.  -VBG without respiratory failure.  -No clinical history suggestive of aspiration.  -Covid negative.  Influenza negative.  Patient has completed second dose of Covid vaccine 1 week ago.  Low suspicion for Covid infection.  -No evidence for CHF exacerbation.  -Patient initiated on Zosyn in the emergency room.  Blood cultures obtained.  --Clinical course seems consistent with pneumococcal pneumonia.  Suspect pneumonia bilateral.  Most notable right lung.  Likely community-acquired pneumonia.  Consider aspiration cover aspiration  pneumonia with Zosyn for now.    Plan: New Zosyn.  Add azithromycin.  Will check pneumococcal and atypical pneumonia panel, incentive spirometry, DuoNebs every 4 hours, resume prior to admission asthma medications.  IMC status at least for now overnight, continuous pulse oximetry, and aggressive inspiratory spirometry, EZ Pap, mobilize, physical therapy occupational therapy, daily weights and I's and O's, low suspicion for Covid infection.,  Aspiration precautions,         Coronary atherosclerosis    S/P CABG (coronary artery bypass graft)  Hypertension goal BP (blood pressure) < 140/90    Hyperlipidemia with target LDL less than 100    Assessment: Been doing well.  Followed by [facility] and seen there this month.  Anginal symptoms.  Troponin negative.  On Pradaxa for atrial fibrillation.  Hydralazine 12.5 mg 3 times daily, Imdur 30 mg daily, Toprol 25 mg twice daily Zocor 40 mg nightly, on torsemide for CHF    Plan: Prior to admission medications.  A.m. BMP      Moderate persistent asthma    Assessment: Doing well lately.  No signs of decompensation.  Denies any wheezing.  He is followed by [facility].  Prior to admission as needed albuterol inhaler DuoNebs 4 times daily, Singulair and budesonide nebulizer twice daily    Plan: Prior to admission medications and will make DuoNebs every 4 hours for now      Anemia   Iron deficiency anemia   Vitamin B 12 deficiency    Assessment: Iron deficiency anemia on early iron.  Baseline hemoglobin in the 9 range.  Currently 10.6.  Denies any bleeding.  Patient on Pradaxa.    Plan: Continue Pradaxa.  Continue iron.  Follow hemoglobin check B12 level          CKD (chronic kidney disease) stage 3, GFR 30-59 ml/min    Assessment: Baseline creatinine 1.5-2.  Creatinine at baseline.  No urinary complaints.    Plan: Avoid nephrotoxins, daily BMP for now.      Atrial fibrillation (H)   hx Thrombus of left atrial appendage    Assessment: Chronic atrial fibrillation on Pradaxa.   History of left atrial appendage.  Currently rate controlled.  On metoprolol.    Plan: Symmetry, continue Pradaxa, continue Toprol-XL, ensure electrolytes within normal limits.      h x Complete heart block (H), pacemaker placement    Assessment: Placating TAVR 12/20/2020.  Status post pacemaker placement      Hx Acute exacerbation of CHF (congestive heart failure) (H)  History of heart failure with preserved ejection fraction.    Assessment: 3 of heart failure with preserved ejection fraction followed by an heart cardiology.  Stable weight and dry weight at 138 to 140 pounds.  Currently on 135 pounds.  Appears euvolemic.  Creatinine in the baseline.    Plan: New prior to admission medications      History of transcatheter aortic valve replacement (TAVR)  History of nonrheumatic severe aortic stenosis            DVT Prophylaxis: Is on anticoagulation with Pradaxa.  Code Status: Discussed in detail with patient with patient's son at the bedside who is power of .  Patient would like to be DNR/DNI.    Disposition: Expected discharge in greater than 2 days, to be determined.  Will likely need TCU.  Social work physical therapy occupational therapy consult.    Sadi Camacho MD    Primary Care Physician   Dany Rodriguez MD    Chief Complaint   Fevers chills shortness of breath, new cough    History is obtained from the patient, chart, ED physician, patient's son    History of Present Illness   Shiraz Ingram is a 86 year old male complex past medical history including coronary disease status post CABG 2002, moderate persistent asthma followed by [facility], anemia, hypertension, dyslipidemia, CKD stage III with baseline creatinine of around 1.5-2, history of aortic stenosis status post TAVR, TAVR complicated by complete heart block requiring pacemaker placement December 2020, B12 deficiency, iron deficiency anemia, history of heart failure with preserved ejection fraction with dry weight 138  pounds to 140 pound who presents with new onset cough shortness of breath fevers and chills.    Patient was in his usual state of health until approximately 2 AM this morning when he developed shaking chills fever felt unwell.  He had a new cough of clear sputum with traces of blood streaks.  He felt progressively more short of breath over the course of the morning.  He was brought to the emergency room by his family who he lives with.  Patient denies any travel or sick contacts.  He had a second Covid vaccination dose 1 week ago.  No Covid contacts or sick contacts at home.  Been compliant with medication.  No edema.  No orthopnea PND.  He was able to walk 10 blocks in the last week or 2.  Weight has been stable. denies any bleeding.      Patient was admitted for TAVR    Patient hypoxic to the 70s on arrival to the emergency room.  He was initiated on face max oxygen and was feeling significantly better.  Heart rate 106 down to the 70s.  Respiratory rate in the 20s.  He is currently satting 99% on 9 L nonrebreather facemask.  VBG showed no respiratory failure.  PCO2 of 49, pH of 7.38.  Laboratory studies notable for normal potassium sodium.  BUN 62 creatinine 1.8.  CRP 32 BNP 4700, pro-Rajiv 5.3.  Troponin negative.  Glucose 102.  Blood cell count 4.6, hemoglobin 10.6 platelet count 136, INR 1.5, sed rate 65, mild lymphopenia.  Blood cultures obtained.  Covid test negative.  Influenza a and B PCR negative.      Patient initiated on Zosyn.    Mid to inpatient IMC status for now.    Past Medical History    I have reviewed this patient's medical history and updated it with pertinent information if needed.   Past Medical History:   Diagnosis Date     Allergic rhinitis, cause unspecified      Anemia, unspecified      Aortic stenosis     severe     Benign neoplasm of colon 1999    Ademonatous polyp     CKD (chronic kidney disease) stage 3, GFR 30-59 ml/min 05/12/2011     Coronary atherosclerosis of unspecified type of  vessel, native or graft     s/p CABG 2002     Hyperlipidemia LDL goal < 100      Hypertension goal BP (blood pressure) < 140/90      Localized osteoarthrosis not specified whether primary or secondary, lower leg     left knee     Moderate persistent asthma      Pacemaker      Persistent atrial fibrillation (H)      Unspecified hearing loss        Past Surgical History   I have reviewed this patient's surgical history and updated it with pertinent information if needed.  Past Surgical History:   Procedure Laterality Date     CARDIAC SURGERY       COLONOSCOPY N/A 5/26/2020    Procedure: COLONOSCOPY;  Surgeon: Ignacio Fournier MD;  Location:  GI     CV ANGIOGRAM CORONARY GRAFT N/A 4/24/2020    Procedure: Angiogram Coronary Graft;  Surgeon: Addison Willard MD;  Location:  HEART CARDIAC CATH LAB     CV CORONARY ANGIOGRAM N/A 4/24/2020    Procedure: Coronary Angiogram;  Surgeon: Addison Willard MD;  Location:  HEART CARDIAC CATH LAB     CV RIGHT HEART CATH MEASUREMENTS RECORDED N/A 4/24/2020    Procedure: Right Heart Cath;  Surgeon: Addison Willard MD;  Location:  HEART CARDIAC CATH LAB     CV TRANSCATHETER AORTIC VALVE REPLACEMENT N/A 7/14/2020    Procedure: Transcatheter Aortic Valve Replacement;  Surgeon: Soraida Monet MD;  Location:  HEART CARDIAC CATH LAB     EP PACEMAKER N/A 7/15/2020    Procedure: EP Pacemaker;  Surgeon: Tommie Sauer MD;  Location:  HEART CARDIAC CATH LAB     HC COLONOSCOPY THRU STOMA W BIOPSY/CAUTERY TUMOR/POLYP/LESION  5/03    Dr. Dow, Q 5 years     HC COLONOSCOPY THRU STOMA W BIOPSY/CAUTERY TUMOR/POLYP/LESION  12/199    Dr. Dow, one adenomatous polyp     HC ESOPHAGOSCOPY, DIAGNOSTIC  5/03    Dr. Dow, lower esophageal ring & mild gastritis     HERNIORRHAPHY INGUINAL Right 9/26/2016    Procedure: HERNIORRHAPHY INGUINAL;  Surgeon: Alexis Alexandra MD;  Location: [facility]     LAPAROSCOPIC CHOLECYSTECTOMY  12/03    for biliary sludge      ZC NONSPECIFIC PROCEDURE  5/02    Coronary artery bypass       Prior to Admission Medications   Prior to Admission Medications   Prescriptions Last Dose Informant Patient Reported? Taking?   albuterol (PROAIR HFA/PROVENTIL HFA/VENTOLIN HFA) 108 (90 Base) MCG/ACT inhaler 3/15/2021 at Unknown time Self No Yes   Sig: INHALE 1 TO 2 PUFFS BY MOUTH EVERY 4 TO 6 HOURS AS NEEDED   aspirin (ASA) 81 MG EC tablet 3/15/2021 at Unknown time Self Yes Yes   Sig: Take 81 mg by mouth daily   budesonide (PULMICORT) 0.5 MG/2ML nebulizer solution 3/15/2021 at Unknown time Self No Yes   Sig: Take 2 mLs (0.5 mg) by nebulization 2 times daily   calcium carbonate 600 mg-vitamin D 400 units (CALTRATE) 600-400 MG-UNIT per tablet 3/15/2021 at Unknown time Self Yes Yes   Sig: Take 1 tablet by mouth daily   dabigatran ANTICOAGULANT (PRADAXA) 150 MG capsule 3/15/2021 at Unknown time Self No Yes   Sig: Take 1 capsule (150 mg) by mouth 2 times daily   ferrous sulfate (IRON) 325 (65 Fe) MG tablet 3/15/2021 at Unknown time Self No Yes   Sig: TAKE 1 TABLET(325 MG) BY MOUTH TWICE DAILY   hydrALAZINE (APRESOLINE) 25 MG tablet 3/15/2021 at Unknown time Self No Yes   Sig: Take 0.5 tablets (12.5 mg) by mouth 3 times daily   ipratropium - albuterol 0.5 mg/2.5 mg/3 mL (DUONEB) 0.5-2.5 (3) MG/3ML nebulization 3/15/2021 at Unknown time Self No Yes   Sig: Inhale 1 vial (3 mLs) into the lungs 4 times daily   isosorbide mononitrate (IMDUR) 30 MG 24 hr tablet 3/15/2021 at Unknown time Self Yes Yes   Sig: Take by mouth daily    magnesium chloride (MAG64) 535 (64 Mg) MG TBEC CR tablet 3/15/2021 at Unknown time Self No Yes   Sig: Take 1 tablet (535 mg) by mouth 2 times daily   meclizine (ANTIVERT) 12.5 MG tablet prn Self No No   Sig: Take 1 tablet (12.5 mg) by mouth 3 times daily as needed for dizziness   melatonin 3 MG tablet prn Self No No   Sig: Take 1 tablet (3 mg) by mouth nightly as needed for sleep   metoprolol tartrate (LOPRESSOR) 25 MG tablet 3/15/2021  at x1 Self No Yes   Sig: Take 1 tablet (25 mg) by mouth 2 times daily   montelukast (SINGULAIR) 10 MG tablet 3/15/2021 at Unknown time Self No Yes   Sig: Take 1 tablet (10 mg) by mouth daily   multivitamin, therapeutic (THERA-VIT) TABS tablet 3/15/2021 at Unknown time Self Yes Yes   Sig: Take 1 tablet by mouth every evening    nitroGLYcerin (NITROSTAT) 0.4 MG sublingual tablet prn Self No No   Sig: For chest pain place 1 tablet under the tongue every 5 minutes for 3 doses. If symptoms persist 5 minutes after 1st dose call 911.   Patient not taking: Reported on 3/1/2021   pantoprazole (PROTONIX) 40 MG EC tablet 3/15/2021 at Unknown time Self No Yes   Sig: TAKE ONE TABLET BY MOUTH TWICE A DAY BEFORE MEALS   simvastatin (ZOCOR) 40 MG tablet 3/15/2021 at Unknown time Self No Yes   Sig: TAKE 1 TABLET(40 MG) BY MOUTH AT BEDTIME   torsemide (DEMADEX) 5 MG tablet 3/14/2021 at PM? Self Yes Yes   Sig: Take 1 tablet (5 mg) by mouth daily      Facility-Administered Medications: None     Allergies   No Known Allergies    Social History   I have reviewed this patient's social history and updated it with pertinent information if needed. Shiraz Ingram  reports that he has quit smoking. His smoking use included cigarettes. He has never used smokeless tobacco. He reports previous alcohol use. He reports that he does not use drugs.    Family History   I have reviewed this patient's family history and updated it with pertinent information if needed.   Family History   Problem Relation Age of Onset     C.A.D. Father      Cancer Mother         breast cancer,  of pneumonia     Cerebrovascular Disease Son      CABG Son      Family History Negative Child      Family History Negative Sister      Heart Disease Brother        Review of Systems   The 10 point Review of Systems is negative other than noted in the HPI or here.     Physical Exam   Temp: 99.5  F (37.5  C) Temp src: Oral BP: 119/74 Pulse: 74   Resp: 30 SpO2: 99 % O2 Device:  Non-rebreather mask Oxygen Delivery: 9 LPM  Vital Signs with Ranges  Temp:  [99.5  F (37.5  C)] 99.5  F (37.5  C)  Pulse:  [] 74  Resp:  [24-30] 30  BP: ()/(47-74) 119/74  SpO2:  [72 %-99 %] 99 %  135 lbs 14.4 oz    Constitutional: Up in the chair, face mask in place.  No acute distress.  Appears comfortable  Eyes: Normal sclera, pupils equal round react to light and accommodation, extraocular eye motions intact  Neck-normal JVP, nontender, supple  HEENT: Normal oropharynx.  S-left upper chest pacemaker-site nontender no signs of redness  Respiratory: Breathing easily, crackles left base and crackles right anterior chest right lower lung.  Decreased breath sounds right lung.  Cardiovascular: Regularly irregular rhythm.  Not tachycardic.  No rubs gallops or murmurs appreciated  GI: Soft nontender nondistended no pedal splenomegaly appreciated  Lymph/Hematologic: No cervical or supraclavicular lymphadenopathy  Genitourinary: Discomfort over the bladder  Skin: No rash or cyanosis.  No edema.  Extremities are warm  Musculoskeletal: No focal joint swelling or erythema.  Neurologic: He is alert and fully oriented.  He uses sign language.  He is deaf.  He is signing and answering questions readily and appropriately.  Good attention.  Psychiatric: Normal affect.  Pleasant and cooperative    Data   Data reviewed today:  I personally reviewed laboratory studies and imaging studies performed in the emergency room.  Recent Labs   Lab 03/15/21  1254   WBC 4.6   HGB 10.6*   MCV 68*   *   INR 1.51*      POTASSIUM 4.4   CHLORIDE 104   CO2 27   BUN 62*   CR 1.83*   ANIONGAP 6   AYALA 9.5   *   ALBUMIN 3.7   PROTTOTAL 7.6   BILITOTAL 1.0   ALKPHOS 69   ALT 38   AST 25   TROPI <0.015       Imaging:  Recent Results (from the past 24 hour(s))   XR Chest Port 1 View    Narrative    CHEST PORTABLE ONE VIEW   3/15/2021 1:10 PM     HISTORY: Shortness of breath.    COMPARISON: Chest x-ray 12/15/2020.       Impression    IMPRESSION: Portable chest. There is new air space consolidation noted  in the right lung concerning for pneumonia. Left lung remains clear.  No pneumothorax or significant pleural fluid. Heart is normal in size.  Prior CABG and transcatheter aortic valve replacement. Implantable  cardiac device and single lead is unchanged in position.    ERASTO LATHAM MD

## 2021-03-15 NOTE — PHARMACY-ADMISSION MEDICATION HISTORY
Pharmacy Medication History  Admission medication history interview status for the 3/15/2021  admission is complete. See EPIC admission navigator for prior to admission medications     Location of Interview: Patient room  Medication history sources: Patient  Son helped interpret ASL  I showed patient the medication list and he marked what he was taking and when he last took it.     Significant changes made to the medication list:  none    In the past week, patient estimated taking medication this percent of the time: greater than 90%    Additional medication history information:   Patient marked that he takes torsemide in the evening which is not typical but this is what he reported.     Medication reconciliation completed by provider prior to medication history? No    Time spent in this activity: 20 min    Prior to Admission medications    Medication Sig Last Dose Taking? Auth Provider   albuterol (PROAIR HFA/PROVENTIL HFA/VENTOLIN HFA) 108 (90 Base) MCG/ACT inhaler INHALE 1 TO 2 PUFFS BY MOUTH EVERY 4 TO 6 HOURS AS NEEDED 3/15/2021 at Unknown time Yes Dany Rodriguez MD   aspirin (ASA) 81 MG EC tablet Take 81 mg by mouth daily 3/15/2021 at Unknown time Yes Reported, Patient   budesonide (PULMICORT) 0.5 MG/2ML nebulizer solution Take 2 mLs (0.5 mg) by nebulization 2 times daily 3/15/2021 at Unknown time Yes Dany Rodriguez MD   calcium carbonate 600 mg-vitamin D 400 units (CALTRATE) 600-400 MG-UNIT per tablet Take 1 tablet by mouth daily 3/15/2021 at Unknown time Yes Unknown, Entered By History   dabigatran ANTICOAGULANT (PRADAXA) 150 MG capsule Take 1 capsule (150 mg) by mouth 2 times daily 3/15/2021 at Unknown time Yes Amee Rocha PA-C   ferrous sulfate (IRON) 325 (65 Fe) MG tablet TAKE 1 TABLET(325 MG) BY MOUTH TWICE DAILY 3/15/2021 at Unknown time Yes Dany Rodriguez MD   hydrALAZINE (APRESOLINE) 25 MG tablet Take 0.5 tablets (12.5 mg) by mouth 3 times daily 3/15/2021 at  Unknown time Yes Britt Hernandez APRN CNP   ipratropium - albuterol 0.5 mg/2.5 mg/3 mL (DUONEB) 0.5-2.5 (3) MG/3ML nebulization Inhale 1 vial (3 mLs) into the lungs 4 times daily 3/15/2021 at Unknown time Yes Dany Rodriguez MD   isosorbide mononitrate (IMDUR) 30 MG 24 hr tablet Take by mouth daily  3/15/2021 at Unknown time Yes Amee Rocha PA-C   magnesium chloride (MAG64) 535 (64 Mg) MG TBEC CR tablet Take 1 tablet (535 mg) by mouth 2 times daily 3/15/2021 at Unknown time Yes Amee Rocha PA-C   metoprolol tartrate (LOPRESSOR) 25 MG tablet Take 1 tablet (25 mg) by mouth 2 times daily 3/15/2021 at x1 Yes Dany Rodriguez MD   montelukast (SINGULAIR) 10 MG tablet Take 1 tablet (10 mg) by mouth daily 3/15/2021 at Unknown time Yes Dany oRdriguez MD   multivitamin, therapeutic (THERA-VIT) TABS tablet Take 1 tablet by mouth every evening  3/15/2021 at Unknown time Yes Unknown, Entered By History   pantoprazole (PROTONIX) 40 MG EC tablet TAKE ONE TABLET BY MOUTH TWICE A DAY BEFORE MEALS 3/15/2021 at Unknown time Yes Dany Rodriguez MD   simvastatin (ZOCOR) 40 MG tablet TAKE 1 TABLET(40 MG) BY MOUTH AT BEDTIME 3/15/2021 at Unknown time Yes Shabnam Christianson MD   torsemide (DEMADEX) 5 MG tablet Take 1 tablet (5 mg) by mouth daily 3/14/2021 at PM? Yes Amee Rocha PA-C   meclizine (ANTIVERT) 12.5 MG tablet Take 1 tablet (12.5 mg) by mouth 3 times daily as needed for dizziness prn  Joon Morris MD   melatonin 3 MG tablet Take 1 tablet (3 mg) by mouth nightly as needed for sleep prn  Nnamdi Sears MD   nitroGLYcerin (NITROSTAT) 0.4 MG sublingual tablet For chest pain place 1 tablet under the tongue every 5 minutes for 3 doses. If symptoms persist 5 minutes after 1st dose call 911.  Patient not taking: Reported on 3/1/2021 Bozena Woo MD Tiffany M. Reinitz, PharmD     The information provided in this note is only as accurate as  the sources available at the time of update(s)

## 2021-03-15 NOTE — ED PROVIDER NOTES
History   Chief Complaint:  Cough and Hemoptysis       A  was used.      Shiraz Ingram is an 86 year old male on Pradaxa with history of CKD, hypertension, hyperlipidemia, Afib, CHF and pacemaker who presents with cough and hemoptysis. The patient developed a wet bloody cough last night with associated shortness of breath and chills. He denies having a bloody cough in the past. The patient has had both doses of the Pfizer vaccine with his last dose 5 days ago. He denies any chest pain, fever, or leg swelling. The son notes that the patient does not drink or smoke. The son also notes that the patient has had frequent pneumonia in the past. The patient does not use oxygen at home. The patient does not want to be intubated.     Review of Systems   Constitutional: Positive for chills. Negative for fever.   Respiratory: Positive for cough (hemoptysis) and shortness of breath.    Cardiovascular: Negative for chest pain and leg swelling.   All other systems reviewed and are negative.       Allergies:  No Known Allergies    Medications:  aspirin   Pradaxa   Hydralazine   isosorbide mononitrate   metoprolol tartrate   pantoprazole  simvastatin   torsemide    Past Medical History:    Anemia, unspecified   Aortic stenosis   Benign neoplasm of colon   CKD (chronic kidney disease)  CHF  Coronary atherosclerosis of unspecified type of vessel, native or graft   Hyperlipidemia  Hypertension  Localized osteoarthrosis   Moderate persistent asthma   Pacemaker   Persistent atrial fibrillation   Unspecified hearing loss     Past Surgical History:    Cardiac surgery   Coronary angiogram   Right heart cath   Transcatheter aortic valve replacement   Pacemaker   coronary artery bypass  Cholecystectomy   Herniorrhaphy inguinal      Family History:    Coronary artery disease   Cancer   cerebrovascular disease    Social History:  The patient presents with his son.   PCP: Dany Rodriguez MD    Physical Exam  "    Patient Vitals for the past 24 hrs:   BP Temp Temp src Pulse Resp SpO2 Height Weight   03/15/21 1345 108/62 -- -- 89 26 99 % -- --   03/15/21 1315 100/59 -- -- 101 29 97 % -- --   03/15/21 1300 97/59 -- -- 87 29 98 % -- --   03/15/21 1244 100/47 99.5  F (37.5  C) Oral 106 24 (!) 72 % 1.702 m (5' 7\") 61.6 kg (135 lb 14.4 oz)       Physical Exam  Nursing note and vitals reviewed.  Constitutional:  Awake and alert. Cooperative.  In moderate respiratory distress.  HENT:   Nose:    Nose normal.   Mouth/Throat:   Face mask is covering mouth.   Eyes:    Conjunctivae normal and EOM are normal.      Pupils are equal, round, and reactive to light.   Neck:    Trachea normal.   Cardiovascular:  Tachycardic rate, irregularly irregular rhythm, normal heart sounds and normal pulses. No murmur heard.  Pulmonary/Chest:  Tachypneic with rales on the right.   Abdominal:   Soft. Normal appearance and bowel sounds are normal.      There is no tenderness.      There is no rebound and no CVA tenderness.   Musculoskeletal:  Extremities atraumatic x 4.   Lymphadenopathy:  No cervical adenopathy.   Neurological:   Alert and oriented to person, place, and time. Normal strength.      No cranial nerve deficit or sensory deficit. GCS eye subscore is 4. GCS verbal subscore is 5. GCS motor subscore is 6.   Skin:    Skin is intact. No rash noted.   Psychiatric:   Normal mood and affect.     Emergency Department Course     ECG:  ECG taken at 1309, ECG read at 1313  Atrial fibrillation with premature ventricular or aberrantly conducted complexes  Right bundle branch block   Left anterior fascicular block   Bifascicular block   Inferior infarct, age undetermined   Abnormal ECG  Rate 97 bpm. AK interval * ms. QRS duration 146 ms. QT/QTc 360/457 ms. P-R-T axes * -67 17.   No significant change when compared to EKG dated 12/15/20.     Imaging:  XR Chest Port 1 View  Final Result  IMPRESSION: Portable chest. There is new air space consolidation " noted  in the right lung concerning for pneumonia. Left lung remains clear.  No pneumothorax or significant pleural fluid. Heart is normal in size.  Prior CABG and transcatheter aortic valve replacement. Implantable  cardiac device and single lead is unchanged in position.  ERASTO LATHAM MD  Reading per radiology     Laboratory:  Blood Culture x2: Pending     Symptomatic Influenza A/B & SARS-CoV2 (COVID-19) Virus PCR Multiplex: negative       CBC:  WBC 4.6, HGB 10.6 (L),  (L), o/w WNL     ABO/Rh type and screen: A positive     INR: 1.51 (H)    CMP: Glucose 102 (H), GFR 33 (L), BUN 62 (H), o/w WNL (Creatinine: 1.83 (H))     Troponin(1254):  <0.015      BNP: 4,702 (H)    CRP inflammation: 32.9 (H)    Erythrocyte sedimentation rate auto: 65 (H)    blood gas venous and oxyhgb: pH: 7.38, PCO2: 49, PO2: 37, Bicarbonate: 29 (H), Oxyhgb: 63     Procalcitonin: 5.32 (HH)    Emergency Department Course:    Reviewed:  I reviewed the patient's nursing notes, vitals, past medical records, Care Everywhere.     Assessments:  1248  I performed an exam of the patient as documented above.   1355  patient rechecked and updated.     Consults:   1405 I spoke with Dr. Camacho of the hospitalist service from [facility] regarding patient's presentation, findings, and plan of care.      Interventions:  1349 Zosyn 3.375 g IV    Disposition:  Admitted.      Impression & Plan   Covid-19  Shiraz Ingram was evaluated during a global COVID-19 pandemic, which necessitated consideration that the patient might be at risk for infection with the SARS-CoV-2 virus that causes COVID-19.   Applicable protocols for evaluation were followed during the patient's care.   COVID-19 was considered as part of the patient's evaluation. The plan for testing is:  a test was obtained during this visit.    Medical Decision Making:  This is an 86-year-old male who came in with his son for further evaluation of a cough, hemoptysis, and shortness of breath.  Upon  arrival to triage, he was quite hypoxic and thus moved to one of our critical care areas.  He was subsequently placed on oxygen by facemask, which helped significantly with his work of breathing and his oxygen saturation levels.  Given his presentation, I was concerned about the possibility of pneumonia as well as sepsis or flash pulmonary edema.  I felt pulmonary embolism was much less likely given the fact that he is anticoagulated.  I proceeded with the above work-up, and he appears to have pneumonia, and I suspect it might be aspiration pneumonia.  He was subsequently provided IV Zosyn.  I then spoke with Dr. Camacho, who will be admitting him.    Diagnosis:    ICD-10-CM    1. Pneumonia of right lung due to infectious organism, unspecified part of lung  J18.9 Symptomatic Influenza A/B & SARS-CoV2 (COVID-19) Virus PCR Multiplex     Procalcitonin   2. Hypoxia  R09.02        Scribe Disclosure:  I, Mark Hartman, am serving as a scribe at 12:51 PM on 3/15/2021 to document services personally performed by Alvarez Morrell MD based on my observations and the provider's statements to me.        Alvarez Morrell MD  03/15/21 0132

## 2021-03-15 NOTE — ED TRIAGE NOTES
"Pt at home and increasingly sob, wet cough and coughing up blood since yesterday. Daughter said \"shaking this morning\".   "

## 2021-03-15 NOTE — PLAN OF CARE
"Pt A/O. VSS. Up with sba. Tele shows afib cvr and BBB. Pt denies any CP or SOB. Weaned to RA for a little time, currently on 2L nc. Pt has no new complaints.    Blood pressure 120/56, pulse 81, temperature 99.5  F (37.5  C), temperature source Oral, resp. rate 22, height 1.702 m (5' 7\"), weight 61.6 kg (135 lb 14.4 oz), SpO2 90 %.      "

## 2021-03-16 ENCOUNTER — APPOINTMENT (OUTPATIENT)
Dept: OCCUPATIONAL THERAPY | Facility: CLINIC | Age: 86
DRG: 871 | End: 2021-03-16
Attending: INTERNAL MEDICINE
Payer: COMMERCIAL

## 2021-03-16 ENCOUNTER — APPOINTMENT (OUTPATIENT)
Dept: GENERAL RADIOLOGY | Facility: CLINIC | Age: 86
DRG: 871 | End: 2021-03-16
Attending: INTERNAL MEDICINE
Payer: COMMERCIAL

## 2021-03-16 PROBLEM — J18.9 PNEUMONIA DUE TO INFECTIOUS ORGANISM: Status: RESOLVED | Noted: 2021-03-15 | Resolved: 2021-03-16

## 2021-03-16 PROBLEM — D63.8 ANEMIA OF CHRONIC DISEASE: Status: ACTIVE | Noted: 2021-03-16

## 2021-03-16 PROBLEM — Z95.2 HISTORY OF TRANSCATHETER AORTIC VALVE REPLACEMENT (TAVR): Status: RESOLVED | Noted: 2020-10-27 | Resolved: 2021-03-16

## 2021-03-16 PROBLEM — I50.9 ACUTE EXACERBATION OF CHF (CONGESTIVE HEART FAILURE) (H): Status: RESOLVED | Noted: 2020-07-28 | Resolved: 2021-03-16

## 2021-03-16 PROBLEM — K22.10 ESOPHAGEAL ULCER: Status: ACTIVE | Noted: 2021-03-16

## 2021-03-16 PROBLEM — R09.02 HYPOXIA: Status: RESOLVED | Noted: 2021-03-15 | Resolved: 2021-03-16

## 2021-03-16 LAB
ANION GAP SERPL CALCULATED.3IONS-SCNC: 4 MMOL/L (ref 3–14)
BUN SERPL-MCNC: 61 MG/DL (ref 7–30)
CALCIUM SERPL-MCNC: 8.6 MG/DL (ref 8.5–10.1)
CHLORIDE SERPL-SCNC: 105 MMOL/L (ref 94–109)
CO2 SERPL-SCNC: 27 MMOL/L (ref 20–32)
CREAT SERPL-MCNC: 2.12 MG/DL (ref 0.66–1.25)
ERYTHROCYTE [DISTWIDTH] IN BLOOD BY AUTOMATED COUNT: 16.1 % (ref 10–15)
ERYTHROCYTE [DISTWIDTH] IN BLOOD BY AUTOMATED COUNT: 16.2 % (ref 10–15)
FOLATE SERPL-MCNC: 39.3 NG/ML
GFR SERPL CREATININE-BSD FRML MDRD: 27 ML/MIN/{1.73_M2}
GLUCOSE BLDC GLUCOMTR-MCNC: 108 MG/DL (ref 70–99)
GLUCOSE SERPL-MCNC: 105 MG/DL (ref 70–99)
HCT VFR BLD AUTO: 24.7 % (ref 40–53)
HCT VFR BLD AUTO: 25 % (ref 40–53)
HEMOCCULT STL QL: NEGATIVE
HGB BLD-MCNC: 7.4 G/DL (ref 13.3–17.7)
HGB BLD-MCNC: 7.6 G/DL (ref 13.3–17.7)
HGB BLD-MCNC: 7.7 G/DL (ref 13.3–17.7)
INTERPRETATION ECG - MUSE: NORMAL
LACTATE BLD-SCNC: 1.9 MMOL/L (ref 0.7–2)
MCH RBC QN AUTO: 20.5 PG (ref 26.5–33)
MCH RBC QN AUTO: 21 PG (ref 26.5–33)
MCHC RBC AUTO-ENTMCNC: 30.4 G/DL (ref 31.5–36.5)
MCHC RBC AUTO-ENTMCNC: 31.2 G/DL (ref 31.5–36.5)
MCV RBC AUTO: 67 FL (ref 78–100)
MCV RBC AUTO: 68 FL (ref 78–100)
PLATELET # BLD AUTO: 86 10E9/L (ref 150–450)
PLATELET # BLD AUTO: 92 10E9/L (ref 150–450)
POTASSIUM SERPL-SCNC: 5 MMOL/L (ref 3.4–5.3)
RBC # BLD AUTO: 3.67 10E12/L (ref 4.4–5.9)
RBC # BLD AUTO: 3.7 10E12/L (ref 4.4–5.9)
SODIUM SERPL-SCNC: 136 MMOL/L (ref 133–144)
WBC # BLD AUTO: 5.2 10E9/L (ref 4–11)
WBC # BLD AUTO: 5.6 10E9/L (ref 4–11)

## 2021-03-16 PROCEDURE — 82272 OCCULT BLD FECES 1-3 TESTS: CPT | Performed by: INTERNAL MEDICINE

## 2021-03-16 PROCEDURE — 94640 AIRWAY INHALATION TREATMENT: CPT

## 2021-03-16 PROCEDURE — 97535 SELF CARE MNGMENT TRAINING: CPT | Mod: GO | Performed by: OCCUPATIONAL THERAPIST

## 2021-03-16 PROCEDURE — 99233 SBSQ HOSP IP/OBS HIGH 50: CPT | Performed by: INTERNAL MEDICINE

## 2021-03-16 PROCEDURE — 999N001017 HC STATISTIC GLUCOSE BY METER IP

## 2021-03-16 PROCEDURE — 97165 OT EVAL LOW COMPLEX 30 MIN: CPT | Mod: GO | Performed by: OCCUPATIONAL THERAPIST

## 2021-03-16 PROCEDURE — 82746 ASSAY OF FOLIC ACID SERUM: CPT | Performed by: INTERNAL MEDICINE

## 2021-03-16 PROCEDURE — 85027 COMPLETE CBC AUTOMATED: CPT | Performed by: INTERNAL MEDICINE

## 2021-03-16 PROCEDURE — 87070 CULTURE OTHR SPECIMN AEROBIC: CPT | Performed by: INTERNAL MEDICINE

## 2021-03-16 PROCEDURE — 80048 BASIC METABOLIC PNL TOTAL CA: CPT | Performed by: INTERNAL MEDICINE

## 2021-03-16 PROCEDURE — 94640 AIRWAY INHALATION TREATMENT: CPT | Mod: 76

## 2021-03-16 PROCEDURE — 999N000147 HC STATISTIC PT IP EVAL DEFER: Performed by: PHYSICAL THERAPIST

## 2021-03-16 PROCEDURE — 87205 SMEAR GRAM STAIN: CPT | Performed by: INTERNAL MEDICINE

## 2021-03-16 PROCEDURE — 250N000009 HC RX 250: Performed by: INTERNAL MEDICINE

## 2021-03-16 PROCEDURE — 36415 COLL VENOUS BLD VENIPUNCTURE: CPT | Performed by: INTERNAL MEDICINE

## 2021-03-16 PROCEDURE — 250N000013 HC RX MED GY IP 250 OP 250 PS 637: Performed by: INTERNAL MEDICINE

## 2021-03-16 PROCEDURE — 999N000157 HC STATISTIC RCP TIME EA 10 MIN

## 2021-03-16 PROCEDURE — 97530 THERAPEUTIC ACTIVITIES: CPT | Mod: GO | Performed by: OCCUPATIONAL THERAPIST

## 2021-03-16 PROCEDURE — 250N000011 HC RX IP 250 OP 636: Performed by: INTERNAL MEDICINE

## 2021-03-16 PROCEDURE — 85018 HEMOGLOBIN: CPT | Performed by: INTERNAL MEDICINE

## 2021-03-16 PROCEDURE — 210N000001 HC R&B IMCU HEART CARE

## 2021-03-16 PROCEDURE — 71046 X-RAY EXAM CHEST 2 VIEWS: CPT

## 2021-03-16 PROCEDURE — 83605 ASSAY OF LACTIC ACID: CPT | Performed by: INTERNAL MEDICINE

## 2021-03-16 RX ADMIN — PIPERACILLIN AND TAZOBACTAM 2.25 G: 2; .25 INJECTION, POWDER, LYOPHILIZED, FOR SOLUTION INTRAVENOUS at 16:26

## 2021-03-16 RX ADMIN — HYDRALAZINE HYDROCHLORIDE 12.5 MG: 25 TABLET ORAL at 09:17

## 2021-03-16 RX ADMIN — IPRATROPIUM BROMIDE AND ALBUTEROL SULFATE 3 ML: .5; 3 SOLUTION RESPIRATORY (INHALATION) at 23:31

## 2021-03-16 RX ADMIN — METOPROLOL TARTRATE 25 MG: 25 TABLET, FILM COATED ORAL at 21:09

## 2021-03-16 RX ADMIN — PIPERACILLIN AND TAZOBACTAM 2.25 G: 2; .25 INJECTION, POWDER, LYOPHILIZED, FOR SOLUTION INTRAVENOUS at 21:09

## 2021-03-16 RX ADMIN — BUDESONIDE 0.5 MG: 0.5 INHALANT RESPIRATORY (INHALATION) at 19:35

## 2021-03-16 RX ADMIN — ISOSORBIDE MONONITRATE 30 MG: 30 TABLET, EXTENDED RELEASE ORAL at 09:18

## 2021-03-16 RX ADMIN — BUDESONIDE 0.5 MG: 0.5 INHALANT RESPIRATORY (INHALATION) at 07:45

## 2021-03-16 RX ADMIN — PIPERACILLIN AND TAZOBACTAM 2.25 G: 2; .25 INJECTION, POWDER, LYOPHILIZED, FOR SOLUTION INTRAVENOUS at 02:51

## 2021-03-16 RX ADMIN — METOPROLOL TARTRATE 25 MG: 25 TABLET, FILM COATED ORAL at 09:18

## 2021-03-16 RX ADMIN — ASPIRIN 81 MG: 81 TABLET, DELAYED RELEASE ORAL at 09:17

## 2021-03-16 RX ADMIN — Medication 1 MG: at 00:34

## 2021-03-16 RX ADMIN — DOXYCYCLINE 100 MG: 100 INJECTION, POWDER, LYOPHILIZED, FOR SOLUTION INTRAVENOUS at 04:05

## 2021-03-16 RX ADMIN — HYDRALAZINE HYDROCHLORIDE 12.5 MG: 25 TABLET ORAL at 16:30

## 2021-03-16 RX ADMIN — DOXYCYCLINE 100 MG: 100 INJECTION, POWDER, LYOPHILIZED, FOR SOLUTION INTRAVENOUS at 16:31

## 2021-03-16 RX ADMIN — IPRATROPIUM BROMIDE AND ALBUTEROL SULFATE 3 ML: .5; 3 SOLUTION RESPIRATORY (INHALATION) at 13:15

## 2021-03-16 RX ADMIN — FERROUS SULFATE TAB 325 MG (65 MG ELEMENTAL FE) 325 MG: 325 (65 FE) TAB at 09:17

## 2021-03-16 RX ADMIN — IPRATROPIUM BROMIDE AND ALBUTEROL SULFATE 3 ML: .5; 3 SOLUTION RESPIRATORY (INHALATION) at 07:43

## 2021-03-16 RX ADMIN — MONTELUKAST 10 MG: 10 TABLET, FILM COATED ORAL at 09:18

## 2021-03-16 RX ADMIN — PANTOPRAZOLE SODIUM 40 MG: 40 TABLET, DELAYED RELEASE ORAL at 05:50

## 2021-03-16 RX ADMIN — PANTOPRAZOLE SODIUM 40 MG: 40 TABLET, DELAYED RELEASE ORAL at 16:30

## 2021-03-16 RX ADMIN — HYDRALAZINE HYDROCHLORIDE 12.5 MG: 25 TABLET ORAL at 21:09

## 2021-03-16 RX ADMIN — PIPERACILLIN AND TAZOBACTAM 2.25 G: 2; .25 INJECTION, POWDER, LYOPHILIZED, FOR SOLUTION INTRAVENOUS at 09:17

## 2021-03-16 RX ADMIN — THERA TABS 1 TABLET: TAB at 21:09

## 2021-03-16 RX ADMIN — IPRATROPIUM BROMIDE AND ALBUTEROL SULFATE 3 ML: .5; 3 SOLUTION RESPIRATORY (INHALATION) at 19:35

## 2021-03-16 RX ADMIN — IPRATROPIUM BROMIDE AND ALBUTEROL SULFATE 3 ML: .5; 3 SOLUTION RESPIRATORY (INHALATION) at 16:16

## 2021-03-16 RX ADMIN — SIMVASTATIN 40 MG: 40 TABLET, FILM COATED ORAL at 21:10

## 2021-03-16 RX ADMIN — TORSEMIDE 5 MG: 5 TABLET ORAL at 09:17

## 2021-03-16 ASSESSMENT — MIFFLIN-ST. JEOR: SCORE: 1288.63

## 2021-03-16 NOTE — PROGRESS NOTES
MD Notification    Notified Person: MD    Notified Person Name: Nickolas    Notification Date/Time: 03/16/21 4:15 PM     Notification Interaction: Talked with MD    Purpose of Notification: 276: hgb back at 7.4, occult stool neg, no signs of bleeding, VSS. Transfuse?     Orders Received: recheck in am, monitor for signs of bleeding, likely just dilutional, resuming pradaxa in the am    Comments:

## 2021-03-16 NOTE — PLAN OF CARE
Tele: Afib CVR with BBB. O2 stable on 1L NC. BP 90s-low 100s systolic. Up SBA. Denies SOB. Continues on IV antibiotics. No new complaints.

## 2021-03-16 NOTE — PROGRESS NOTES
SPIRITUAL HEALTH SERVICES Progress Note  [facility]  Purpose of Visit: Patient Request    I introduced self and [facility] to  and through  to patient.  Patient respectfully declines services at this time.    Plan: [facility] remains available as needed and requested.      Amee Blankenship   Intern

## 2021-03-16 NOTE — PLAN OF CARE
PT: Orders received. OT screened pt for PT needs and it is not recommended that pt has PT needs at this time. OT will address pt's ADLs and education. Order will be completed

## 2021-03-16 NOTE — PROGRESS NOTES
03/16/21 0952   Quick Adds   Type of Visit Initial Occupational Therapy Evaluation       Present yes   Language    Living Environment   People in home child(perla), adult   Current Living Arrangements house   Home Accessibility stairs to enter home;stairs within home   Number of Stairs, Main Entrance 3   Number of Stairs, Within Home, Primary   (15 stairs in basement for laundry. )   Transportation Anticipated car, drives self;family or friend will provide   Self-Care   Regular Exercise Yes   Activity/Exercise Type walking  (does exercises after TAVR, walks dog daily 6 blocks)   Equipment Currently Used at Home grab bar, tub/shower   Activity/Exercise/Self-Care Comment tub/shower combo with grab bars   Disability/Function   Hearing Difficulty or Deaf yes   Patient's preferred means of communication    Describe hearing loss bilateral hearing loss   General Information   Onset of Illness/Injury or Date of Surgery 03/15/21   Referring Physician Sadi Camacho MD   Patient/Family Therapy Goal Statement (OT) home   Additional Occupational Profile Info/Pertinent History of Current Problem 86 year old male complex past medical history including coronary disease status post CABG 2002, moderate persistent asthma followed by [place] [facility], anemia, hypertension, dyslipidemia, CKD stage III with baseline creatinine of around 1.5-2, history of aortic stenosis status post TAVR, TAVR complicated by complete heart block requiring pacemaker placement December 2020, B12 deficiency, iron deficiency anemia, history of heart failure with preserved ejection fraction with dry weight 138 pounds to 140 pound who presents with new onset cough shortness of breath fevers and chills.  Found to have right-sided pneumonia and likely left lower pneumonia with acute hypoxic respiratory failure.  Covid negative.    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    Sensory   Sensory Quick Adds No deficits were identified   Pain Assessment   Patient Currently in Pain No   Range of Motion Comprehensive   Comment, General Range of Motion B UE AROM WFL   Strength Comprehensive (MMT)   Comment, General Manual Muscle Testing (MMT) Assessment B strength WFL   Bed Mobility   Supine-Sit [place] (Bed Mobility) independent   Transfer Skill: Bed to Chair/Chair to Bed   Bed-Chair [place] (Transfers) supervision   Sit-Stand Transfer   Sit-Stand [place] (Transfers) independent   Toilet Transfer   [place] Level (Toilet Transfer) independent   Lower Body Dressing Assessment/Training   [place] Level (Lower Body Dressing) independent   Instrumental Activities of Daily Living (IADL)   Previous Responsibilities meal prep;laundry;housekeeping;medication management;driving;finances   Clinical Impression   Criteria for Skilled Therapeutic Interventions Met (OT) yes   OT Diagnosis increased ADL/IADL's and functional mobility   OT Problem List-Impairments impacting ADL problems related to;activity tolerance impaired   Assessment of Occupational Performance 1-3 Performance Deficits   Identified Performance Deficits impaired ADL/.IADL's   Planned Therapy Interventions (OT) ADL retraining;transfer training;home program guidelines;progressive activity/exercise   Clinical Decision Making Complexity (OT) low complexity   Therapy Frequency (OT) 5x/week   Predicted Duration of Therapy 3 days   Risk & Benefits of therapy have been explained evaluation/treatment results reviewed;care  plan/treatment goals reviewed;risks/benefits reviewed;current/potential barriers reviewed;participants voiced agreement with care plan;participants included;patient   OT Discharge Planning    OT Discharge Recommendation (DC Rec) Home with assist   OT Rationale for DC Rec Pt with decreased activity tolerance, O2 sats stable with functional mobility and ADL's, pt educated in energy conservatoin tech and recommend home with family A as needed with cleaning and laundry, some cooking if needed as he recovers from pnuemonia. No PT needs noted, pt S/I withambulation.    Total Evaluation Time (Minutes)   Total Evaluation Time (Minutes) 10

## 2021-03-16 NOTE — PROVIDER NOTIFICATION
MD Notification    Notified Person: MD    Notified Person Name:     Notification Date/Time: 3- 0845    Notification Interaction: Text paged    Purpose of Notification: Hemoglobin 7.7    Orders Received:    Comments:

## 2021-03-16 NOTE — PLAN OF CARE
Pt VSS on RA. Tele afib CVR. A&Ox4, deaf. Up with SBA and walker, using urinal at bedside. Denies pain or SOB. Plan for iv abxs, rechecking hgb in the am, resuming anticoagulation tomorrow. Continue to monitor.

## 2021-03-16 NOTE — PLAN OF CARE
VSS. Monitor remains Atrial fib with CVR, BBB. Pt. Denies pain. O2 weaned off with stable O2 sats. Hemoglobin 7.7 this AM. Recheck pending. Pt. Had formed brown stool . Negative for occult blood. Continue to monitor.

## 2021-03-16 NOTE — PROGRESS NOTES
[facility]    Hospitalist Progress Note    Assessment & Plan   86 year old male complex past medical history including coronary disease status post CABG 2002, moderate persistent asthma followed by [place] [facility], anemia, hypertension, dyslipidemia, CKD stage III with baseline creatinine of around 1.5-2, history of aortic stenosis status post TAVR, TAVR complicated by complete heart block requiring pacemaker placement December 2020, B12 deficiency, iron deficiency anemia, history of heart failure with preserved ejection fraction with dry weight 138 pounds to 140 pound who presents with new onset cough shortness of breath fevers and chills.  Found to have right-sided pneumonia and likely left lower pneumonia with acute hypoxic respiratory failure.  Covid negative.  Patient has had vaccinations.     Principal Problem:    Pneumonia, RUL, RML, RLL    -- on Zosyn and Azithromycin, clinically improved    -- PCT 5.32, CRP 32.9      Sepsis (temp 99.5, , RR 29)   -- clinically improved       Acute respiratory failure with hypoxia   -- improving, was on 6 LPM, now on 1 LPM       Anemia, probably acute blood loss -- possible dilutional component, got fluids for borderline hypotension related to sepsis    -- repeat Hgb at 2 PM, check stool for blood   -- continue PPI bid      Hx of Esophageal Ulcer -- S/P cautery 5/24/20   -- continue PPI     Active Problems:    Moderate persistent asthma    Hypertension at baseline    Hyperlipidemia with target LDL less than 100    CKD (chronic kidney disease) stage 3, GFR 30-59 ml/min    Iron deficiency anemia    Vitamin B 12 deficiency    Severe aortic stenosis -- S/P TAVR 12/2020    CAD, S/P CABG in 2002    Persistent Afib -- on Pradaxa    -- will hold Pradaxa until hgb stable     Thrombus of left atrial appendage    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         Plan:  Discussed with nurse and patient    DVT Prophylaxis: Pneumatic Compression Devices  Code  Status: No CPR- Do NOT Intubate    Disposition: Expected discharge in 3 days    Sivakumar Lewis MD  Pager [phone]  Cell Phone [phone]  Text Page (7am to 6pm)  (35 min total, >50% with patient and counseling)    Interval History   Has cough with yellow sputum, breathing better today, O2 down from 6 to now 1 LPM.  Is deaf, has .  Reports his stool is dark brown -- but is on iron.  No heartburn or epigastric pain, and he is not aware of any other bleeding.      Physical Exam   Temp: 98.5  F (36.9  C) Temp src: Oral BP: 113/55 Pulse: 65   Resp: 19 SpO2: 96 % O2 Device: Nasal cannula Oxygen Delivery: 1 LPM  Vitals:    03/15/21 1244 03/16/21 0557   Weight: 61.6 kg (135 lb 14.4 oz) 65 kg (143 lb 4.8 oz)     Vital Signs with Ranges  Temp:  [98.5  F (36.9  C)-99.5  F (37.5  C)] 98.5  F (36.9  C)  Pulse:  [] 65  Resp:  [15-30] 19  BP: ()/(41-83) 113/55  SpO2:  [72 %-99 %] 96 %  I/O last 3 completed shifts:  In: 340 [P.O.:240; IV Piggyback:100]  Out: 700 [Urine:700]    # Pain Assessment:  Current Pain Score 3/16/2021   Patient currently in pain? denies   Pain score (0-10) -   Pain location -   Pain descriptors -   Shiraz higgins pain level was assessed and he currently denies pain.        Constitutional: Awake, alert, cooperative, no apparent distress  Respiratory: Clear to auscultation bilaterally, no crackles or wheezing  Cardiovascular: irregular rate and rhythm, normal S1 and S2, and no murmur noted  GI: Normal bowel sounds, soft, non-distended, non-tender  Extrem: No calf tenderness, no ankle edema  Neuro: Ox3, no focal motor or sensory deficits, is deaf (speaks with sign-language)    Medications     - MEDICATION INSTRUCTIONS -         aspirin  81 mg Oral Daily     budesonide  0.5 mg Nebulization BID     dabigatran ANTICOAGULANT  75 mg Oral BID     doxycycline (VIBRAMYCIN) IV  100 mg Intravenous Q12H     ferrous sulfate  325 mg Oral BID     hydrALAZINE  12.5 mg Oral TID     ipratropium -  albuterol 0.5 mg/2.5 mg/3 mL  3 mL Nebulization Q4H     isosorbide mononitrate  30 mg Oral Daily     metoprolol tartrate  25 mg Oral BID     montelukast  10 mg Oral Daily     multivitamin, therapeutic  1 tablet Oral QPM     pantoprazole  40 mg Oral BID AC     piperacillin-tazobactam  2.25 g Intravenous Q6H     simvastatin  40 mg Oral At Bedtime     torsemide  5 mg Oral Daily       Data   Recent Labs   Lab 03/16/21  0741 03/16/21  0542 03/15/21  1254   WBC 5.6 5.2 4.6   HGB 7.7* 7.6* 10.6*   MCV 67* 68* 68*   PLT 92* 86* 136*   INR  --   --  1.51*   NA  --  136 137   POTASSIUM  --  5.0 4.4   CHLORIDE  --  105 104   CO2  --  27 27   BUN  --  61* 62*   CR  --  2.12* 1.83*   ANIONGAP  --  4 6   AYALA  --  8.6 9.5   GLC  --  105* 102*   ALBUMIN  --   --  3.7   PROTTOTAL  --   --  7.6   BILITOTAL  --   --  1.0   ALKPHOS  --   --  69   ALT  --   --  38   AST  --   --  25   TROPI  --   --  <0.015       Imaging:   Recent Results (from the past 24 hour(s))   XR Chest Port 1 View    Narrative    CHEST PORTABLE ONE VIEW   3/15/2021 1:10 PM     HISTORY: Shortness of breath.    COMPARISON: Chest x-ray 12/15/2020.      Impression    IMPRESSION: Portable chest. There is new air space consolidation noted  in the right lung concerning for pneumonia. Left lung remains clear.  No pneumothorax or significant pleural fluid. Heart is normal in size.  Prior CABG and transcatheter aortic valve replacement. Implantable  cardiac device and single lead is unchanged in position.    ERASTO LATHAM MD

## 2021-03-17 ENCOUNTER — OFFICE VISIT (OUTPATIENT)
Dept: INTERPRETER SERVICES | Facility: CLINIC | Age: 86
End: 2021-03-17
Payer: COMMERCIAL

## 2021-03-17 ENCOUNTER — APPOINTMENT (OUTPATIENT)
Dept: OCCUPATIONAL THERAPY | Facility: CLINIC | Age: 86
DRG: 871 | End: 2021-03-17
Payer: COMMERCIAL

## 2021-03-17 DIAGNOSIS — J45.40 MODERATE PERSISTENT ASTHMA: ICD-10-CM

## 2021-03-17 LAB
ANION GAP SERPL CALCULATED.3IONS-SCNC: 7 MMOL/L (ref 3–14)
BUN SERPL-MCNC: 49 MG/DL (ref 7–30)
CALCIUM SERPL-MCNC: 8.7 MG/DL (ref 8.5–10.1)
CHLORIDE SERPL-SCNC: 104 MMOL/L (ref 94–109)
CO2 SERPL-SCNC: 24 MMOL/L (ref 20–32)
CREAT SERPL-MCNC: 1.99 MG/DL (ref 0.66–1.25)
ERYTHROCYTE [DISTWIDTH] IN BLOOD BY AUTOMATED COUNT: 16.4 % (ref 10–15)
GFR SERPL CREATININE-BSD FRML MDRD: 29 ML/MIN/{1.73_M2}
GLUCOSE SERPL-MCNC: 154 MG/DL (ref 70–99)
GRAM STN SPEC: NORMAL
HCT VFR BLD AUTO: 24.3 % (ref 40–53)
HGB BLD-MCNC: 7.5 G/DL (ref 13.3–17.7)
Lab: NORMAL
MCH RBC QN AUTO: 20.9 PG (ref 26.5–33)
MCHC RBC AUTO-ENTMCNC: 30.9 G/DL (ref 31.5–36.5)
MCV RBC AUTO: 68 FL (ref 78–100)
PLATELET # BLD AUTO: 99 10E9/L (ref 150–450)
POTASSIUM SERPL-SCNC: 4.5 MMOL/L (ref 3.4–5.3)
RBC # BLD AUTO: 3.58 10E12/L (ref 4.4–5.9)
SODIUM SERPL-SCNC: 135 MMOL/L (ref 133–144)
SPECIMEN SOURCE: NORMAL
WBC # BLD AUTO: 5.9 10E9/L (ref 4–11)

## 2021-03-17 PROCEDURE — T1013 SIGN LANG/ORAL INTERPRETER: HCPCS | Mod: U3

## 2021-03-17 PROCEDURE — 999N000157 HC STATISTIC RCP TIME EA 10 MIN

## 2021-03-17 PROCEDURE — 250N000013 HC RX MED GY IP 250 OP 250 PS 637: Performed by: INTERNAL MEDICINE

## 2021-03-17 PROCEDURE — 94640 AIRWAY INHALATION TREATMENT: CPT | Mod: 76

## 2021-03-17 PROCEDURE — 250N000009 HC RX 250: Performed by: INTERNAL MEDICINE

## 2021-03-17 PROCEDURE — 250N000011 HC RX IP 250 OP 636: Performed by: INTERNAL MEDICINE

## 2021-03-17 PROCEDURE — 120N000001 HC R&B MED SURG/OB

## 2021-03-17 PROCEDURE — 97110 THERAPEUTIC EXERCISES: CPT | Mod: GO | Performed by: OCCUPATIONAL THERAPIST

## 2021-03-17 PROCEDURE — 85027 COMPLETE CBC AUTOMATED: CPT | Performed by: INTERNAL MEDICINE

## 2021-03-17 PROCEDURE — 36415 COLL VENOUS BLD VENIPUNCTURE: CPT | Performed by: INTERNAL MEDICINE

## 2021-03-17 PROCEDURE — 99232 SBSQ HOSP IP/OBS MODERATE 35: CPT | Performed by: INTERNAL MEDICINE

## 2021-03-17 PROCEDURE — 80048 BASIC METABOLIC PNL TOTAL CA: CPT | Performed by: INTERNAL MEDICINE

## 2021-03-17 PROCEDURE — 94640 AIRWAY INHALATION TREATMENT: CPT

## 2021-03-17 RX ORDER — DOXYCYCLINE 100 MG/1
100 CAPSULE ORAL EVERY 12 HOURS SCHEDULED
Status: DISCONTINUED | OUTPATIENT
Start: 2021-03-17 | End: 2021-03-18 | Stop reason: HOSPADM

## 2021-03-17 RX ORDER — CEFTRIAXONE 1 G/1
1 INJECTION, POWDER, FOR SOLUTION INTRAMUSCULAR; INTRAVENOUS EVERY 24 HOURS
Status: DISCONTINUED | OUTPATIENT
Start: 2021-03-17 | End: 2021-03-18

## 2021-03-17 RX ADMIN — METOPROLOL TARTRATE 25 MG: 25 TABLET, FILM COATED ORAL at 21:42

## 2021-03-17 RX ADMIN — IPRATROPIUM BROMIDE AND ALBUTEROL SULFATE 3 ML: .5; 3 SOLUTION RESPIRATORY (INHALATION) at 08:09

## 2021-03-17 RX ADMIN — HYDRALAZINE HYDROCHLORIDE 12.5 MG: 25 TABLET ORAL at 06:41

## 2021-03-17 RX ADMIN — CEFTRIAXONE SODIUM 1 G: 1 INJECTION, POWDER, FOR SOLUTION INTRAMUSCULAR; INTRAVENOUS at 10:31

## 2021-03-17 RX ADMIN — IPRATROPIUM BROMIDE AND ALBUTEROL SULFATE 3 ML: .5; 3 SOLUTION RESPIRATORY (INHALATION) at 19:36

## 2021-03-17 RX ADMIN — IPRATROPIUM BROMIDE AND ALBUTEROL SULFATE 3 ML: .5; 3 SOLUTION RESPIRATORY (INHALATION) at 11:40

## 2021-03-17 RX ADMIN — TORSEMIDE 5 MG: 5 TABLET ORAL at 06:41

## 2021-03-17 RX ADMIN — PIPERACILLIN AND TAZOBACTAM 2.25 G: 2; .25 INJECTION, POWDER, LYOPHILIZED, FOR SOLUTION INTRAVENOUS at 04:34

## 2021-03-17 RX ADMIN — IPRATROPIUM BROMIDE AND ALBUTEROL SULFATE 3 ML: .5; 3 SOLUTION RESPIRATORY (INHALATION) at 23:50

## 2021-03-17 RX ADMIN — THERA TABS 1 TABLET: TAB at 21:42

## 2021-03-17 RX ADMIN — ASPIRIN 81 MG: 81 TABLET, DELAYED RELEASE ORAL at 06:40

## 2021-03-17 RX ADMIN — MONTELUKAST 10 MG: 10 TABLET, FILM COATED ORAL at 06:40

## 2021-03-17 RX ADMIN — SIMVASTATIN 40 MG: 40 TABLET, FILM COATED ORAL at 21:42

## 2021-03-17 RX ADMIN — ISOSORBIDE MONONITRATE 30 MG: 30 TABLET, EXTENDED RELEASE ORAL at 06:41

## 2021-03-17 RX ADMIN — DOXYCYCLINE 100 MG: 100 INJECTION, POWDER, LYOPHILIZED, FOR SOLUTION INTRAVENOUS at 05:19

## 2021-03-17 RX ADMIN — METOPROLOL TARTRATE 25 MG: 25 TABLET, FILM COATED ORAL at 06:40

## 2021-03-17 RX ADMIN — BUDESONIDE 0.5 MG: 0.5 INHALANT RESPIRATORY (INHALATION) at 19:36

## 2021-03-17 RX ADMIN — HYDRALAZINE HYDROCHLORIDE 12.5 MG: 25 TABLET ORAL at 16:00

## 2021-03-17 RX ADMIN — PANTOPRAZOLE SODIUM 40 MG: 40 TABLET, DELAYED RELEASE ORAL at 06:40

## 2021-03-17 RX ADMIN — DOXYCYCLINE 100 MG: 100 CAPSULE ORAL at 17:40

## 2021-03-17 RX ADMIN — HYDRALAZINE HYDROCHLORIDE 12.5 MG: 25 TABLET ORAL at 21:43

## 2021-03-17 RX ADMIN — PANTOPRAZOLE SODIUM 40 MG: 40 TABLET, DELAYED RELEASE ORAL at 16:00

## 2021-03-17 RX ADMIN — BUDESONIDE 0.5 MG: 0.5 INHALANT RESPIRATORY (INHALATION) at 08:11

## 2021-03-17 RX ADMIN — IPRATROPIUM BROMIDE AND ALBUTEROL SULFATE 3 ML: .5; 3 SOLUTION RESPIRATORY (INHALATION) at 15:50

## 2021-03-17 ASSESSMENT — ACTIVITIES OF DAILY LIVING (ADL)
ADLS_ACUITY_SCORE: 14
ADLS_ACUITY_SCORE: 14

## 2021-03-17 ASSESSMENT — MIFFLIN-ST. JEOR: SCORE: 1301.79

## 2021-03-17 NOTE — PROGRESS NOTES
[facility]    Hospitalist Progress Note    Assessment & Plan   86 year old male S/P CABG 2002, moderate persistent asthma followed by [place] [facility], anemia, hypertension, dyslipidemia, CKD stage III with baseline creatinine of around 1.5-2, history of aortic stenosis status post TAVR, TAVR complicated by complete heart block requiring pacemaker placement December 2020, B12 deficiency, iron deficiency anemia, history of heart failure with preserved ejection fraction -- presents with cough and sputum production and SOB.  Covid negative.  Patient has had vaccinations.     Principal Problem:    Pneumonia, RUL, RML, RLL    -- on Zosyn and Azithromycin, clinically improved    -- PCT 5.32, CRP 32.9      Sepsis (temp 99.5, , RR 29)   -- clinically improved       Acute respiratory failure with hypoxia   -- improving, was on 6 LPM, now on 1 LPM       Anemia, chronic disease -- possible dilutional component, got fluids for borderline hypotension related to sepsis    -- low TIBC, but normal iron and B12   -- continue PPI bid      Hx of Esophageal Ulcer -- S/P cautery 5/24/20   -- continue PPI     Active Problems:    Moderate persistent asthma    Hypertension at baseline    Hyperlipidemia with target LDL less than 100    CKD (chronic kidney disease) stage 3, GFR 30-59 ml/min    Iron deficiency anemia    Vitamin B 12 deficiency    Severe aortic stenosis -- S/P TAVR 12/2020    CAD, S/P CABG in 2002    Persistent Afib -- on Pradaxa    -- will hold Pradaxa until hgb stable     Thrombus of left atrial appendage    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     Sivakumar Lewis MD  Pager [phone]  Cell Phone [phone]  Text Page (7am to 6pm)    Interval History   Cough  improved, down to 2 LPM O2.      Physical Exam   Temp: 98.8  F (37.1  C) Temp src: Oral BP: 134/62 Pulse: 72   Resp: 18 SpO2: 95 % O2 Device: None (Room air) Oxygen Delivery: 2 LPM  Vitals:    03/15/21 1244 03/16/21 0557 03/17/21 0610   Weight: 61.6 kg (135 lb 14.4 oz) 65 kg (143 lb 4.8 oz) 66.3 kg (146 lb 3.2 oz)     Vital Signs with Ranges  Temp:  [97.4  F (36.3  C)-98.8  F (37.1  C)] 98.8  F (37.1  C)  Pulse:  [65-83] 72  Resp:  [15-25] 18  BP: ()/(51-76) 134/62  SpO2:  [90 %-100 %] 95 %  I/O last 3 completed shifts:  In: 240 [P.O.:240]  Out: 1850 [Urine:1850]    # Pain Assessment:  Current Pain Score 3/17/2021   Patient currently in pain? denies   Pain score (0-10) -   Pain location -   Pain descriptors -   Shiraz higgins pain level was assessed and he currently denies pain.        Constitutional: Awake, alert, cooperative, no apparent distress  Respiratory: Clear to auscultation bilaterally, no crackles or wheezing  Cardiovascular: irregular rate and rhythm, normal S1 and S2, and no murmur noted  GI: Normal bowel sounds, soft, non-distended, non-tender  Extrem: No calf tenderness, no ankle edema  Neuro: Ox3, no focal motor or sensory deficits, is deaf (speaks with sign-language)    Medications     - MEDICATION INSTRUCTIONS -         aspirin  81 mg Oral Daily     budesonide  0.5 mg Nebulization BID     cefTRIAXone  1 g Intravenous Q24H     doxycycline hyclate  100 mg Oral Q12H JONI     hydrALAZINE  12.5 mg Oral TID     ipratropium - albuterol 0.5 mg/2.5 mg/3 mL  3 mL Nebulization Q4H     isosorbide mononitrate  30 mg Oral Daily     metoprolol tartrate  25 mg Oral BID     montelukast  10 mg Oral Daily     multivitamin, therapeutic  1 tablet Oral QPM     pantoprazole  40 mg Oral BID AC     simvastatin  40 mg Oral At Bedtime     torsemide  5 mg Oral Daily       Data   Recent Labs   Lab 03/17/21  0915 03/16/21  1535 03/16/21  0741 03/16/21  0542 03/15/21  1254   WBC 5.9  --  5.6 5.2 4.6   HGB 7.5* 7.4* 7.7* 7.6* 10.6*    MCV 68*  --  67* 68* 68*   PLT 99*  --  92* 86* 136*   INR  --   --   --   --  1.51*     --   --  136 137   POTASSIUM 4.5  --   --  5.0 4.4   CHLORIDE 104  --   --  105 104   CO2 24  --   --  27 27   BUN 49*  --   --  61* 62*   CR 1.99*  --   --  2.12* 1.83*   ANIONGAP 7  --   --  4 6   AYALA 8.7  --   --  8.6 9.5   *  --   --  105* 102*   ALBUMIN  --   --   --   --  3.7   PROTTOTAL  --   --   --   --  7.6   BILITOTAL  --   --   --   --  1.0   ALKPHOS  --   --   --   --  69   ALT  --   --   --   --  38   AST  --   --   --   --  25   TROPI  --   --   --   --  <0.015       Imaging:   No results found for this or any previous visit (from the past 24 hour(s)).

## 2021-03-17 NOTE — PROVIDER NOTIFICATION
MD Notification    Notified Person: MD    Notified Person Name:     Notification Date/Time: 3- 1048    Notification Interaction: Text paged    Purpose of Notification: Hemoglobin 7.5. No sign of bleeding.    Orders Received:    Comments:

## 2021-03-17 NOTE — PLAN OF CARE
VSS. No complaints of pain. HR Afib CVR. Pt sating well on RA throughout much of shift, placed on 2L per NC for comfort during episode of SOB that was received w/ O2 and scheduled neb treatment. Pt up w/ Ax1.

## 2021-03-17 NOTE — PROVIDER NOTIFICATION
MD Notification    Notified Person: MD    Notified Person Name:     Notification Date/Time: 3- 1100    Notification Interaction: Text paged    Purpose of Notification:  Hemoglobin 7.5. No sign of bleeding.    Orders Received:    Comments:

## 2021-03-17 NOTE — CONSULTS
Care Management Initial Consult    General Information  Assessment completed with: Shiraz Asher  Type of CM/SW Visit: Initial Assessment    Primary Care Provider verified and updated as needed: Yes   Readmission within the last 30 days: no previous admission in last 30 days      Reason for Consult: discharge planning  Advance Care Planning:            Communication Assessment  Patient's communication style: (pt is deaf/uses sign language )    Hearing Difficulty or Deaf: yes   Wear Glasses or Blind: yes    Cognitive  Cognitive/Neuro/Behavioral: WDL                      Living Environment:   People in home: child(perla), adult     Current living Arrangements: house      Able to return to prior arrangements: yes       Family/Social Support:  Care provided by: self  Provides care for: no one     Children          Description of Support System: Supportive         Current Resources:   Patient receiving home care services:       Community Resources:    Equipment currently used at home: grab bar, tub/shower  Supplies currently used at home:      Employment/Financial:  Employment Status: retired        Financial Concerns: No concerns identified           Lifestyle & Psychosocial Needs:  Lifestyle     Physical activity     Days per week: 4 days     Minutes per session: 30 min     Stress: Not on file        Socioeconomic History     Marital status:      Spouse name: Kailey     Number of children: 3     Years of education: Not on file     Highest education level: Not on file   Occupational History     Occupation: [employer]     Employer: RETIRED     Tobacco Use     Smoking status: Former Smoker     Types: Cigarettes     Smokeless tobacco: Never Used     Tobacco comment: smoked for a week in 20's   Substance and Sexual Activity     Alcohol use: Not Currently     Alcohol/week: 0.0 standard drinks     Drug use: No     Sexual activity: Yes     Partners: Female       Functional Status:  Prior to admission patient needed  assistance:              Mental Health Status:          Chemical Dependency Status:                Values/Beliefs:  Spiritual, Cultural Beliefs, Christianity Practices, Values that affect care:                 Additional Information:  Per consult for discharge planning, met with pt along with a sign language interpretor to discuss discharge plans.  Per pt, he lives with his children and has been independent with all ADLs.  Discussed that PT recommending home with assist and pt stated that his children are able to assist with his needs.  Discussed PCP follow-up and pt shared that he has follow-up with his PCP on Mar 22, 2021  9:00 AM   SHORT with Dany Rodriguez MD   [facility] ([facility])  Patient had no discharge needs and anticipate he will discharge home tomorrow.  JOSE Dailey RN, BSN, OCN   Inpatient Care Coordination [address]  Office: [phone]

## 2021-03-17 NOTE — PLAN OF CARE
VSS. Monitor remains Atrial fib with CVR, BBB. Pt. Denies pain. Stable O2 sats with Pt. On room air. Plan for transfer to [facility]. Report called to receiving nurse Carmen. Pt. Satya Clayton notified of plan to transfer pt.    1250-Pt. Transferred to [facility]

## 2021-03-17 NOTE — PLAN OF CARE
Transferred up from CCU around 1300.  at bedside until 1500 today. A/Ox4. VSS on RA. Denies SOB. LS dim. Infrequent congested cough. IS at bedside. SBA + gb/walker  - ambulated halls x1 - tolerated well with only slight CLEARY. PIV SL X2. Cardiac diet. Denies pain. Continent of B&B. Possible discharge home tomorrow (3/18) as he lives with children who can assist as needed.

## 2021-03-18 VITALS
HEART RATE: 79 BPM | BODY MASS INDEX: 22.95 KG/M2 | SYSTOLIC BLOOD PRESSURE: 138 MMHG | WEIGHT: 146.2 LBS | DIASTOLIC BLOOD PRESSURE: 61 MMHG | TEMPERATURE: 97.8 F | OXYGEN SATURATION: 95 % | HEIGHT: 67 IN | RESPIRATION RATE: 18 BRPM

## 2021-03-18 LAB
ANION GAP SERPL CALCULATED.3IONS-SCNC: 5 MMOL/L (ref 3–14)
BUN SERPL-MCNC: 39 MG/DL (ref 7–30)
CALCIUM SERPL-MCNC: 8.9 MG/DL (ref 8.5–10.1)
CHLORIDE SERPL-SCNC: 103 MMOL/L (ref 94–109)
CO2 SERPL-SCNC: 26 MMOL/L (ref 20–32)
CREAT SERPL-MCNC: 1.81 MG/DL (ref 0.66–1.25)
GFR SERPL CREATININE-BSD FRML MDRD: 33 ML/MIN/{1.73_M2}
GLUCOSE SERPL-MCNC: 127 MG/DL (ref 70–99)
HGB BLD-MCNC: 7.4 G/DL (ref 13.3–17.7)
POTASSIUM SERPL-SCNC: 4.1 MMOL/L (ref 3.4–5.3)
SODIUM SERPL-SCNC: 134 MMOL/L (ref 133–144)

## 2021-03-18 PROCEDURE — 250N000013 HC RX MED GY IP 250 OP 250 PS 637: Performed by: INTERNAL MEDICINE

## 2021-03-18 PROCEDURE — 85018 HEMOGLOBIN: CPT | Performed by: INTERNAL MEDICINE

## 2021-03-18 PROCEDURE — 94640 AIRWAY INHALATION TREATMENT: CPT | Mod: 76

## 2021-03-18 PROCEDURE — 99239 HOSP IP/OBS DSCHRG MGMT >30: CPT | Performed by: INTERNAL MEDICINE

## 2021-03-18 PROCEDURE — 94640 AIRWAY INHALATION TREATMENT: CPT

## 2021-03-18 PROCEDURE — 36415 COLL VENOUS BLD VENIPUNCTURE: CPT | Performed by: INTERNAL MEDICINE

## 2021-03-18 PROCEDURE — 999N000157 HC STATISTIC RCP TIME EA 10 MIN

## 2021-03-18 PROCEDURE — 80048 BASIC METABOLIC PNL TOTAL CA: CPT | Performed by: INTERNAL MEDICINE

## 2021-03-18 PROCEDURE — 250N000009 HC RX 250: Performed by: INTERNAL MEDICINE

## 2021-03-18 RX ORDER — DOXYCYCLINE 100 MG/1
100 CAPSULE ORAL EVERY 12 HOURS
Qty: 10 CAPSULE | Refills: 0 | Status: SHIPPED | OUTPATIENT
Start: 2021-03-18 | End: 2021-03-23

## 2021-03-18 RX ORDER — CEFUROXIME AXETIL 500 MG/1
500 TABLET ORAL EVERY 12 HOURS SCHEDULED
Status: DISCONTINUED | OUTPATIENT
Start: 2021-03-18 | End: 2021-03-18 | Stop reason: HOSPADM

## 2021-03-18 RX ORDER — CEFUROXIME AXETIL 500 MG/1
500 TABLET ORAL EVERY 12 HOURS
Qty: 10 TABLET | Refills: 0 | Status: SHIPPED | OUTPATIENT
Start: 2021-03-18 | End: 2021-06-08

## 2021-03-18 RX ADMIN — PANTOPRAZOLE SODIUM 40 MG: 40 TABLET, DELAYED RELEASE ORAL at 06:27

## 2021-03-18 RX ADMIN — ASPIRIN 81 MG: 81 TABLET, DELAYED RELEASE ORAL at 08:25

## 2021-03-18 RX ADMIN — IPRATROPIUM BROMIDE AND ALBUTEROL SULFATE 3 ML: .5; 3 SOLUTION RESPIRATORY (INHALATION) at 15:19

## 2021-03-18 RX ADMIN — BUDESONIDE 0.5 MG: 0.5 INHALANT RESPIRATORY (INHALATION) at 07:12

## 2021-03-18 RX ADMIN — IPRATROPIUM BROMIDE AND ALBUTEROL SULFATE 3 ML: .5; 3 SOLUTION RESPIRATORY (INHALATION) at 11:12

## 2021-03-18 RX ADMIN — HYDRALAZINE HYDROCHLORIDE 12.5 MG: 25 TABLET ORAL at 08:25

## 2021-03-18 RX ADMIN — MONTELUKAST 10 MG: 10 TABLET, FILM COATED ORAL at 08:25

## 2021-03-18 RX ADMIN — METOPROLOL TARTRATE 25 MG: 25 TABLET, FILM COATED ORAL at 08:25

## 2021-03-18 RX ADMIN — IPRATROPIUM BROMIDE AND ALBUTEROL SULFATE 3 ML: .5; 3 SOLUTION RESPIRATORY (INHALATION) at 07:12

## 2021-03-18 RX ADMIN — TORSEMIDE 5 MG: 5 TABLET ORAL at 08:25

## 2021-03-18 RX ADMIN — DOXYCYCLINE 100 MG: 100 CAPSULE ORAL at 06:28

## 2021-03-18 RX ADMIN — ISOSORBIDE MONONITRATE 30 MG: 30 TABLET, EXTENDED RELEASE ORAL at 08:25

## 2021-03-18 RX ADMIN — CEFUROXIME AXETIL 500 MG: 500 TABLET ORAL at 08:25

## 2021-03-18 ASSESSMENT — ACTIVITIES OF DAILY LIVING (ADL)
ADLS_ACUITY_SCORE: 14

## 2021-03-18 NOTE — PLAN OF CARE
Occupational Therapy Discharge Summary    Reason for therapy discharge:    Discharged to home.    Progress towards therapy goal(s). See goals on Care Plan in [facility] electronic health record for goal details.  Goals not met.  Barriers to achieving goals:   discharge from facility.    Therapy recommendation(s):    No further therapy is recommended. Per POC dated 3/16/21:Rec. for discharge is home w/ assist;Pt with decreased activity tolerance, O2 sats stable with functional mobility and ADL's, pt educated in energy conservatoin tech and recommend home with family A as needed with cleaning and laundry, some cooking if needed as he recovers from pnuemonia. No PT needs noted, pt S/I with ambulation.

## 2021-03-18 NOTE — PLAN OF CARE
Pt A/O. Deaf, communicated well with writing things down. VSS on RA. Denied pain. PIV SL. Voiding well. Up SBA. Plan for possible discharge home today.  will be present today [id number].

## 2021-03-18 NOTE — DISCHARGE SUMMARY
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)    Moderate pulm hypertension -- PAP 46-55 plus RAP by Echo 12/15/20      Anemia of chronic disease (baseline Hgb 9.0-10.5)      Hx of Iron Def Anemia    Active Problems:    Moderate persistent asthma    Hypertension goal BP (blood pressure) < 140/90    Hyperlipidemia with target LDL less than 100    CKD (chronic kidney disease) stage 3, GFR 30-59 ml/min    Vitamin B 12 deficiency    Severe aortic stenosis -- S/P TAVR 12/2020    CAD, S/P CABG in 2002    Persistent Afib -- on Pradaxa     Thrombus of left atrial appendage    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    Hx of Esophageal ulcer -- Cauterized 5/24/20    Complete Neurosensory hearing loss    History of Present Illness   86 year old male with CAD S/P CABG 2002, moderate persistent asthma followed by [facility], anemia, hypertension, dyslipidemia, CKD stage III with baseline creatinine of around 1.5-2, history of aortic stenosis status post TAVR, TAVR complicated by complete heart block requiring pacemaker placement December 2020, B12 deficiency, iron deficiency anemia, history of heart failure with preserved ejection fraction with dry weight 138 pounds to 140 pound who presents with new onset cough shortness of breath fevers and chills.     Patient hypoxic to the 70s on arrival to the emergency room.  Heart rate 106 down to the 70s.  Respiratory rate in the 20s.  BUN 62 creatinine 1.8.  CRP 32 BNP 4700, pro-Rajiv 5.3.  Troponin negative.  Glucose 102.  Blood cell count 4.6, hemoglobin 10.6 platelet count 136, INR 1.5, sed rate 65, mild lymphopenia.  Blood cultures obtained.  Covid test negative.   Influenza a and B PCR negative.      Hospital Course   Admitted to CCU with initial need of up to 10 LPM O2, and treated with IV Zosyn and Azithromycin, which was then switched to Doxycyline.     His hgd did drop from 10.6 to 7.4, but then stable.  Pradaxa was held, stool guaiac negative, and suspect the hgb drop was probably dilutional related to initial treatment of pneumonia with fluids.  Platelets did drop from 136K downt to 92K, but rising to 99K by discharge.     Weaned off O2 by discharge, was seen by PT and OT and felt safe to return home.  He is completely deaf, and had an  while here.       Sivakumar Lewis MD  Pager: [phone]  Cell Phone:  [phone]       Significant Results and Procedures   As above    Pending Results   These results will be followed up by Dr. Lewis  Unresulted Labs Ordered in the Past 30 Days of this Admission     Date and Time Order Name Status Description    3/15/2021 1550 Sputum Culture Aerobic Bacterial Preliminary     3/15/2021 1256 Blood culture Preliminary     3/15/2021 1256 Blood culture Preliminary           Code Status   DNR / DNI       Primary Care Physician   Dany Rodriguez MD    Physical Exam   Temp: 99.3  F (37.4  C) Temp src: Oral BP: 100/46 Pulse: 76   Resp: 20 SpO2: 96 % O2 Device: None (Room air)    Vitals:    03/15/21 1244 03/16/21 0557 03/17/21 0610   Weight: 61.6 kg (135 lb 14.4 oz) 65 kg (143 lb 4.8 oz) 66.3 kg (146 lb 3.2 oz)     Vital Signs with Ranges  Temp:  [97.4  F (36.3  C)-99.3  F (37.4  C)] 99.3  F (37.4  C)  Pulse:  [67-85] 76  Resp:  [16-25] 20  BP: (100-147)/(46-71) 100/46  SpO2:  [90 %-100 %] 96 %  I/O last 3 completed shifts:  In: 600 [P.O.:600]  Out: 700 [Urine:700]    Exam on discharge:   Lungs clear  CV with reg S1S2    Discharge Disposition   Discharged to home  Condition at discharge: Fair    Consultations This Hospital Stay   PHARMACY IP CONSULT  CARE MANAGEMENT / SOCIAL WORK IP CONSULT  PHYSICAL THERAPY  ADULT IP CONSULT  OCCUPATIONAL THERAPY ADULT IP CONSULT    Time Spent on this Encounter   I spent a total of 35 minutes discharging this patient.     Discharge Orders   No discharge procedures on file.  Discharge Medications   Current Discharge Medication List      CONTINUE these medications which have NOT CHANGED    Details   albuterol (PROAIR HFA/PROVENTIL HFA/VENTOLIN HFA) 108 (90 Base) MCG/ACT inhaler INHALE 1 TO 2 PUFFS BY MOUTH EVERY 4 TO 6 HOURS AS NEEDED  Qty: 18 g, Refills: 2    Associated Diagnoses: Moderate persistent asthma without complication      aspirin (ASA) 81 MG EC tablet Take 81 mg by mouth daily      budesonide (PULMICORT) 0.5 MG/2ML nebulizer solution Take 2 mLs (0.5 mg) by nebulization 2 times daily  Qty: 120 mL, Refills: 0    Associated Diagnoses: Moderate persistent asthma      calcium carbonate 600 mg-vitamin D 400 units (CALTRATE) 600-400 MG-UNIT per tablet Take 1 tablet by mouth daily      dabigatran ANTICOAGULANT (PRADAXA) 150 MG capsule Take 1 capsule (150 mg) by mouth 2 times daily  Qty: 180 capsule, Refills: 3    Associated Diagnoses: Coronary artery disease involving native coronary artery of native heart without angina pectoris      ferrous sulfate (IRON) 325 (65 Fe) MG tablet TAKE 1 TABLET(325 MG) BY MOUTH TWICE DAILY  Qty: 180 tablet, Refills: 0    Associated Diagnoses: Anemia, unspecified type      hydrALAZINE (APRESOLINE) 25 MG tablet Take 0.5 tablets (12.5 mg) by mouth 3 times daily  Qty: 90 tablet, Refills: 3    Associated Diagnoses: Acute on chronic congestive heart failure, unspecified heart failure type (H)      ipratropium - albuterol 0.5 mg/2.5 mg/3 mL (DUONEB) 0.5-2.5 (3) MG/3ML nebulization Inhale 1 vial (3 mLs) into the lungs 4 times daily  Qty: 360 mL, Refills: 11    Associated Diagnoses: Moderate persistent asthma, uncomplicated      isosorbide mononitrate (IMDUR) 30 MG 24 hr tablet Take by mouth daily   Qty: 90 tablet, Refills: 3    Associated Diagnoses: Acute on  chronic congestive heart failure, unspecified heart failure type (H)      magnesium chloride (MAG64) 535 (64 Mg) MG TBEC CR tablet Take 1 tablet (535 mg) by mouth 2 times daily  Qty: 60 tablet, Refills: 3    Associated Diagnoses: Acute diastolic heart failure (H)      metoprolol tartrate (LOPRESSOR) 25 MG tablet Take 1 tablet (25 mg) by mouth 2 times daily  Qty: 180 tablet, Refills: 0    Associated Diagnoses: Hypertension goal BP (blood pressure) < 140/90      montelukast (SINGULAIR) 10 MG tablet Take 1 tablet (10 mg) by mouth daily  Qty: 90 tablet, Refills: 0    Associated Diagnoses: Moderate persistent asthma      multivitamin, therapeutic (THERA-VIT) TABS tablet Take 1 tablet by mouth every evening       pantoprazole (PROTONIX) 40 MG EC tablet TAKE ONE TABLET BY MOUTH TWICE A DAY BEFORE MEALS  Qty: 180 tablet, Refills: 0    Associated Diagnoses: Ulcer of esophagus with bleeding      simvastatin (ZOCOR) 40 MG tablet TAKE 1 TABLET(40 MG) BY MOUTH AT BEDTIME  Qty: 90 tablet, Refills: 0    Associated Diagnoses: Hyperlipidemia with target LDL less than 100      torsemide (DEMADEX) 5 MG tablet Take 1 tablet (5 mg) by mouth daily    Associated Diagnoses: Acute on chronic congestive heart failure, unspecified heart failure type (H)      meclizine (ANTIVERT) 12.5 MG tablet Take 1 tablet (12.5 mg) by mouth 3 times daily as needed for dizziness  Qty: 90 tablet, Refills: 1    Associated Diagnoses: Dizziness      melatonin 3 MG tablet Take 1 tablet (3 mg) by mouth nightly as needed for sleep  Qty: 30 tablet, Refills: 0    Associated Diagnoses: Insomnia, unspecified type      nitroGLYcerin (NITROSTAT) 0.4 MG sublingual tablet For chest pain place 1 tablet under the tongue every 5 minutes for 3 doses. If symptoms persist 5 minutes after 1st dose call 911.  Qty: 12 tablet, Refills: 0    Comments: Future refills by PCP Dr. Dany Rodriguez MD with phone number [phone].  Associated Diagnoses: Severe aortic stenosis;  Complete heart block (H); Permanent atrial fibrillation (H); Thrombus of left atrial appendage           Allergies   No Known Allergies  Data   Most Recent 3 CBC's:  Recent Labs   Lab Test 03/18/21  0739 03/17/21  0915 03/16/21  1535 03/16/21  0741 03/16/21  0542   WBC  --  5.9  --  5.6 5.2   HGB 7.4* 7.5* 7.4* 7.7* 7.6*   MCV  --  68*  --  67* 68*   PLT  --  99*  --  92* 86*      Most Recent 3 BMP's:  Recent Labs   Lab Test 03/18/21  0739 03/17/21  0915 03/16/21  0542    135 136   POTASSIUM 4.1 4.5 5.0   CHLORIDE 103 104 105   CO2 26 24 27   BUN 39* 49* 61*   CR 1.81* 1.99* 2.12*   ANIONGAP 5 7 4   AYALA 8.9 8.7 8.6   * 154* 105*     Most Recent 2 LFT's:  Recent Labs   Lab Test 03/15/21  1254 09/28/20 2029   AST 25 16   ALT 38 19   ALKPHOS 69 64   BILITOTAL 1.0 0.6     Most Recent INR's and Anticoagulation Dosing History:  Anticoagulation Dose History     Recent Dosing and Labs Latest Ref Rng & Units 3/16/2012 4/23/2020 4/24/2020 5/23/2020 6/3/2020 7/13/2020 3/15/2021    INR 0.86 - 1.14 1.11 2.35(H) 1.24(H) 2.18(H) 1.25(H) 1.21(H) 1.51(H)        Most Recent 3 Troponin's:  Recent Labs   Lab Test 03/15/21  1254 12/15/20  1454 12/15/20  1137   TROPI <0.015 0.023 0.030     Most Recent Cholesterol Panel:  Recent Labs   Lab Test 06/01/20  0907   CHOL 119   LDL 54   HDL 48   TRIG 87     Most Recent 6 Bacteria Isolates From Any Culture (See EPIC Reports for Culture Details):  Recent Labs   Lab Test 03/16/21  2310 03/15/21  1320 03/15/21  1254 09/29/20  0950 09/28/20 2049 09/28/20 2028   CULT Heavy growth  Normal katie to date   No growth after 3 days No growth after 3 days Heavy growth  Normal katie   No growth No growth  No growth     Most Recent TSH, T4 and A1c Labs:  Recent Labs   Lab Test 03/15/21  1254   TSH 1.68

## 2021-03-19 ENCOUNTER — TELEPHONE (OUTPATIENT)
Dept: FAMILY MEDICINE | Facility: CLINIC | Age: 86
End: 2021-03-19

## 2021-03-19 ENCOUNTER — PATIENT OUTREACH (OUTPATIENT)
Dept: NURSING | Facility: CLINIC | Age: 86
End: 2021-03-19
Payer: COMMERCIAL

## 2021-03-19 DIAGNOSIS — J96.01 ACUTE RESPIRATORY FAILURE WITH HYPOXIA (H): Primary | ICD-10-CM

## 2021-03-19 LAB
BACTERIA SPEC CULT: NORMAL
SPECIMEN SOURCE: NORMAL

## 2021-03-19 RX ORDER — MONTELUKAST SODIUM 10 MG/1
TABLET ORAL
Qty: 90 TABLET | Refills: 0 | Status: SHIPPED | OUTPATIENT
Start: 2021-03-19 | End: 2021-03-22

## 2021-03-19 SDOH — ECONOMIC STABILITY: TRANSPORTATION INSECURITY
IN THE PAST 12 MONTHS, HAS LACK OF TRANSPORTATION KEPT YOU FROM MEETINGS, WORK, OR FROM GETTING THINGS NEEDED FOR DAILY LIVING?: NO

## 2021-03-19 SDOH — ECONOMIC STABILITY: FOOD INSECURITY: WITHIN THE PAST 12 MONTHS, THE FOOD YOU BOUGHT JUST DIDN'T LAST AND YOU DIDN'T HAVE MONEY TO GET MORE.: NEVER TRUE

## 2021-03-19 SDOH — SOCIAL STABILITY: SOCIAL NETWORK: ARE YOU MARRIED, WIDOWED, DIVORCED, SEPARATED, NEVER MARRIED, OR LIVING WITH A PARTNER?: MARRIED

## 2021-03-19 SDOH — SOCIAL STABILITY: SOCIAL NETWORK
IN A TYPICAL WEEK, HOW MANY TIMES DO YOU TALK ON THE PHONE WITH FAMILY, FRIENDS, OR NEIGHBORS?: MORE THAN THREE TIMES A WEEK

## 2021-03-19 SDOH — ECONOMIC STABILITY: FOOD INSECURITY: WITHIN THE PAST 12 MONTHS, YOU WORRIED THAT YOUR FOOD WOULD RUN OUT BEFORE YOU GOT MONEY TO BUY MORE.: NEVER TRUE

## 2021-03-19 SDOH — ECONOMIC STABILITY: INCOME INSECURITY: HOW HARD IS IT FOR YOU TO PAY FOR THE VERY BASICS LIKE FOOD, HOUSING, MEDICAL CARE, AND HEATING?: NOT HARD AT ALL

## 2021-03-19 SDOH — ECONOMIC STABILITY: TRANSPORTATION INSECURITY
IN THE PAST 12 MONTHS, HAS THE LACK OF TRANSPORTATION KEPT YOU FROM MEDICAL APPOINTMENTS OR FROM GETTING MEDICATIONS?: NO

## 2021-03-19 ASSESSMENT — ACTIVITIES OF DAILY LIVING (ADL): DEPENDENT_IADLS:: INDEPENDENT

## 2021-03-19 NOTE — PROGRESS NOTES
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%, possible diastolic component)    Moderate pulm hypertension -- PAP 46-55 plus RAP by Echo 12/15/20       Anemia of chronic disease (baseline Hgb 9.0-10.5)       Hx of Iron Def Anemia     Active Problems:    Moderate persistent asthma    Hypertension goal BP (blood pressure) < 140/90    Hyperlipidemia with target LDL less than 100    CKD (chronic kidney disease) stage 3, GFR 30-59 ml/min    Vitamin B 12 deficiency    Severe aortic stenosis -- S/P TAVR 12/2020    CAD, S/P CABG in 2002    Persistent Afib -- on Pradaxa     Thrombus of left atrial appendage    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   Brief conversation with the patients son due to he is at work.  Son reports the patient states he was fine last night and he was sleeping  "when he left for work  Patient lives with 2 son's and a girlfriend who is with the patient all day.  Son states the patient understands  the  CHF diagnosis .  Patient sets up his own medications and drives his own care.  Walks in the neighborhood for exercise/  Son will set up a follow up appointment with Pulmonologist and patient has a hospital follow up with PCP on Monday.  Son will review discharge instructions and call with questions.  Son agrees with a follow up call  next week   Current Behavioral Concerns:No   Education Provided to patient: AYSHA RN role introduced to son    Pain  Pain (GOAL):: No  Health Maintenance Reviewed: Not assessed  Clinical Pathway: Clinic Care Coordination CHF Assessment    Discharge:      Day of hospital discharge: 3/15/21  What recommendations were made for follow up after your recent hospitalization? Follow up with PCP and Pulmonology  Have the follow up appointments been scheduled? Yes  If not, can I help you set up these appointments? No       CHF:    Home scale available:  No  Heart Failure Zones sheet on refrigerator or available: No  Any increased SOB since hospital discharge:  No  Any increased edema since hospital discharge:  No  What number to call for YELLOW zones: [phone]    Symptom Review:   Heart Failure Symptoms  Shortness of breath:: No  Wheezing or noisy breathing?: No  Cough: No  Increased sputum: No  Fever: No  Chest pain: : No  Dizzy or Lightheaded: No  Checking weight daily? : No  Does the patient have understanding of Diuretic self-management?: Yes  Diet:: No added salt  Appetite:: Normal  Fatigue: No  Weakness (Heaviness in limbs):: No  What Heart Failure zone are you currently in?: Green  Overall your CHF symptoms are (GOAL):: Stable    Medications:  \"How many new medications are you on since your hospitalization?\"  0 - 1  \"How many of your current medicines changed (dose, timing, name, etc.) while you were in the hospital?\"  0 - 1  \"Do you have questions " "about your medications?\"  No  For patients on insulin: \"Did you start on insulin in the hospital or did you have your insulin dose changed?\"  No  Is patient on Warfarin?  No  Is Ejection Fraction <40%: No      Medication Management:  Medication reconciliation status: Medications reviewed and reconciled.  Continue medications without change.     Functional Status:  Dependent ADLs:: Independent  Dependent IADLs:: Independent  Bed or wheelchair confined:: No  Mobility Status: Independent    Living Situation:  Current living arrangement:: I live in a private home with family    Lifestyle & Psychosocial Needs:  Lifestyle     Physical activity     Days per week: 4 days     Minutes per session: 30 min     Stress: Not on file     Social Needs     Financial resource strain: Not hard at all     Food insecurity     Worry: Never true     Inability: Never true     Transportation needs     Medical: No     Non-medical: No     Diet:: No added salt  Inadequate nutrition (GOAL):: No  Tube Feeding: No  Inadequate activity/exercise (GOAL):: No  Significant changes in sleep pattern (GOAL): No  Transportation means:: Accessible car     Samaritan or spiritual beliefs that impact treatment:: No  Mental health DX:: No  Mental health management concern (GOAL):: No  Chemical Dependency Status: No Current Concerns   Socioeconomic History     Marital status:      Spouse name: Kailey     Number of children: 3     Years of education: Not on file     Highest education level: Not on file   Occupational History     Occupation: [employer]     Employer: RETIRED   Relationships     Social connections     Talks on phone: More than three times a week     Gets together: Not on file     Attends Zoroastrianism service: Not on file     Active member of club or organization: Not on file     Attends meetings of clubs or organizations: Not on file     Relationship status:      Intimate partner violence     Fear of current or ex partner: Not on file     " Emotionally abused: Not on file     Physically abused: Not on file     Forced sexual activity: Not on file     Tobacco Use     Smoking status: Former Smoker     Types: Cigarettes     Smokeless tobacco: Never Used     Tobacco comment: smoked for a week in 20's   Substance and Sexual Activity     Alcohol use: Not Currently     Alcohol/week: 0.0 standard drinks     Drug use: No     Sexual activity: Yes     Partners: Female        Resources and Interventions:  Current Resources:      Community Resources: None  Supplies used at home:: Nebulizer tubing  Equipment Currently Used at Home: grab bar, tub/shower    Advance Care Plan/Directive  Advanced Care Plans/Directives on file:: Yes  Type Advanced Care Plans/Directives: DNR/DNI    Referrals Placed: None     Goals:       Patient/Caregiver understanding: Son expresses understanding of discharge instructions     Outreach Frequency: weekly  Future Appointments              In 3 days Adina Baig; Dany Rodriguez MD [facility],     In 6 days [address], [facility]    In 2 months IpJaxon MD [facility]          Plan:   Son will make a future appointment with the Pulmonologist   Patient will keep hospital follow up with PP on Monday 3/22/2021  Son will review discharge instructions and call CCRN with any questions  CC will follow up in 3-5 business days   [facility]   Mckenna Daniels RN, Care Coordinator   [facility] [place]   E-mail [email or web address]   [phone]

## 2021-03-22 ENCOUNTER — OFFICE VISIT (OUTPATIENT)
Dept: FAMILY MEDICINE | Facility: CLINIC | Age: 86
End: 2021-03-22
Payer: COMMERCIAL

## 2021-03-22 VITALS
HEART RATE: 53 BPM | TEMPERATURE: 95.9 F | BODY MASS INDEX: 22.87 KG/M2 | DIASTOLIC BLOOD PRESSURE: 56 MMHG | OXYGEN SATURATION: 99 % | RESPIRATION RATE: 20 BRPM | WEIGHT: 146 LBS | SYSTOLIC BLOOD PRESSURE: 136 MMHG

## 2021-03-22 DIAGNOSIS — I50.9 ACUTE ON CHRONIC CONGESTIVE HEART FAILURE, UNSPECIFIED HEART FAILURE TYPE (H): ICD-10-CM

## 2021-03-22 DIAGNOSIS — D64.9 ACUTE ANEMIA: Primary | ICD-10-CM

## 2021-03-22 DIAGNOSIS — N18.32 STAGE 3B CHRONIC KIDNEY DISEASE (H): ICD-10-CM

## 2021-03-22 DIAGNOSIS — J45.40 MODERATE PERSISTENT ASTHMA WITHOUT COMPLICATION: ICD-10-CM

## 2021-03-22 DIAGNOSIS — K22.11 ULCER OF ESOPHAGUS WITH BLEEDING: ICD-10-CM

## 2021-03-22 PROBLEM — J18.9 COMMUNITY ACQUIRED PNEUMONIA: Status: RESOLVED | Noted: 2020-09-29 | Resolved: 2021-03-22

## 2021-03-22 LAB
ANION GAP SERPL CALCULATED.3IONS-SCNC: 4 MMOL/L (ref 3–14)
BUN SERPL-MCNC: 45 MG/DL (ref 7–30)
CALCIUM SERPL-MCNC: 9.3 MG/DL (ref 8.5–10.1)
CHLORIDE SERPL-SCNC: 105 MMOL/L (ref 94–109)
CO2 SERPL-SCNC: 27 MMOL/L (ref 20–32)
CREAT SERPL-MCNC: 1.65 MG/DL (ref 0.66–1.25)
ERYTHROCYTE [DISTWIDTH] IN BLOOD BY AUTOMATED COUNT: 16.5 % (ref 10–15)
GFR SERPL CREATININE-BSD FRML MDRD: 37 ML/MIN/{1.73_M2}
GLUCOSE SERPL-MCNC: 92 MG/DL (ref 70–99)
HCT VFR BLD AUTO: 28.6 % (ref 40–53)
HGB BLD-MCNC: 9 G/DL (ref 13.3–17.7)
MCH RBC QN AUTO: 21 PG (ref 26.5–33)
MCHC RBC AUTO-ENTMCNC: 31.5 G/DL (ref 31.5–36.5)
MCV RBC AUTO: 67 FL (ref 78–100)
PLATELET # BLD AUTO: 227 10E9/L (ref 150–450)
POTASSIUM SERPL-SCNC: 5 MMOL/L (ref 3.4–5.3)
RBC # BLD AUTO: 4.28 10E12/L (ref 4.4–5.9)
SODIUM SERPL-SCNC: 136 MMOL/L (ref 133–144)
WBC # BLD AUTO: 4.2 10E9/L (ref 4–11)

## 2021-03-22 PROCEDURE — T1013 SIGN LANG/ORAL INTERPRETER: HCPCS | Mod: U3

## 2021-03-22 PROCEDURE — 99495 TRANSJ CARE MGMT MOD F2F 14D: CPT | Performed by: FAMILY MEDICINE

## 2021-03-22 PROCEDURE — 80048 BASIC METABOLIC PNL TOTAL CA: CPT | Performed by: FAMILY MEDICINE

## 2021-03-22 PROCEDURE — 85027 COMPLETE CBC AUTOMATED: CPT | Performed by: FAMILY MEDICINE

## 2021-03-22 PROCEDURE — 36415 COLL VENOUS BLD VENIPUNCTURE: CPT | Performed by: FAMILY MEDICINE

## 2021-03-22 RX ORDER — BUDESONIDE 0.5 MG/2ML
0.5 INHALANT ORAL 2 TIMES DAILY
Qty: 120 ML | Refills: 0 | Status: CANCELLED | OUTPATIENT
Start: 2021-03-22

## 2021-03-22 RX ORDER — ALBUTEROL SULFATE 90 UG/1
AEROSOL, METERED RESPIRATORY (INHALATION)
Qty: 18 G | Refills: 2 | Status: SHIPPED | OUTPATIENT
Start: 2021-03-22 | End: 2021-09-02

## 2021-03-22 RX ORDER — MONTELUKAST SODIUM 10 MG/1
10 TABLET ORAL DAILY
Qty: 90 TABLET | Refills: 0 | Status: SHIPPED | OUTPATIENT
Start: 2021-03-22 | End: 2021-06-22

## 2021-03-22 RX ORDER — TORSEMIDE 5 MG/1
5 TABLET ORAL DAILY
Qty: 90 TABLET | Refills: 0 | Status: SHIPPED | OUTPATIENT
Start: 2021-03-22 | End: 2021-06-08

## 2021-03-22 RX ORDER — PANTOPRAZOLE SODIUM 40 MG/1
TABLET, DELAYED RELEASE ORAL
Qty: 180 TABLET | Refills: 0 | Status: SHIPPED | OUTPATIENT
Start: 2021-03-22 | End: 2021-06-22

## 2021-03-22 NOTE — PROGRESS NOTES
Assessment & Plan     Moderate persistent asthma without complication  With recent diagnosis of pneumonia.  Feeling really well now.  Has had multiple cardiac and pulmonary exacerbation recently.  Going to see pulmonologist tomorrow.  - albuterol (PROAIR HFA/PROVENTIL HFA/VENTOLIN HFA) 108 (90 Base) MCG/ACT inhaler; INHALE 1 TO 2 PUFFS BY MOUTH EVERY 4 TO 6 HOURS AS NEEDED  - montelukast (SINGULAIR) 10 MG tablet; Take 1 tablet (10 mg) by mouth daily    Ulcer of esophagus with bleeding  Significant drop in hemoglobin during hospitalization which was thought to be from hemodilution.  Hemoglobin is improving.  Continue the same dose of Protonix.  - pantoprazole (PROTONIX) 40 MG EC tablet; TAKE ONE TABLET BY MOUTH TWICE A DAY BEFORE MEALS    Acute on chronic congestive heart failure, unspecified heart failure type (H)  Weight is stable.  Checking weight every day.  Home weight is 139 today.  In the clinic weight is 146.  Continue same dose of torsemide.  Not having dizziness.  - torsemide (DEMADEX) 5 MG tablet; Take 1 tablet (5 mg) by mouth daily    Acute anemia  Could be from GI bleed versus hemodilution.  Repeat hemoglobin is stable.  - CBC with platelets    Stage 3b chronic kidney disease  Acute on chronic kidney failure during hospitalization.  Repeat labs today.  - Basic metabolic panel  (Ca, Cl, CO2, Creat, Gluc, K, Na, BUN)    Answered all his questions via .    Return in about 3 months (around 6/22/2021).    Dany Rodriguez MD, MD  [facility]    Javon Weldon is a 86 year old who presents for the following health issues     HPI     Hospital Follow-up Visit:    Hospital/Nursing Home/ Rehab Facility: [facility]  Date of Admission: 3/15/21  Date of Discharge: 3/18/21  Reason(s) for Admission: Pneumonia      Was your hospitalization related to COVID-19? No   Problems taking medications regularly:  None  Medication changes since discharge:  None  Problems adhering to non-medication therapy:  None    Summary of hospitalization:  [facility] discharge summary reviewed  Diagnostic Tests/Treatments reviewed.  Follow up needed: none  Other Healthcare Providers Involved in Patient s Care:         None  Update since discharge: improved.       Post Discharge Medication Reconciliation: discharge medications reconciled, continue medications without change.  Plan of care communicated with patient              Feels he is back to normal now   No short of breath at all. Rescue inhaler   duoneb 4 times per day. Not needing albuterol as needed. Very infrequent use of albuterol.   At home weight 140. This morning it was 139. Very stable. Right now weight is high from cloths and shoes.   Budesonide 2 times per day.     March 29th - seeing pulmonologist. No longer on pradaxa.     Low hgb and kideny function. Not sure why.     Seeing cardiologist in June.     Smoked for a day only.          Review of Systems         Objective    /56   Pulse 53   Temp 95.9  F (35.5  C) (Tympanic)   Resp 20   Wt 66.2 kg (146 lb)   SpO2 99%   BMI 22.87 kg/m    Body mass index is 22.87 kg/m .  Physical Exam   Mildly irregular heart rhythm  Minimal left lung base wheezing but no crackles.

## 2021-03-22 NOTE — PATIENT INSTRUCTIONS
We are working hard to begin vaccinating more people against COVID-19. Currently, we are only vaccinating Phase 1a workers - healthcare workers who are unable to do their job remotely. Vaccine availability is very limited.      If you are a healthcare worker and you are unable to do your job remotely, please log in to [employer] using this link to see if we have openings and schedule an appointment. At your vaccine appointment, you will be asked to provide proof of employment as a health care worker. If you cannot, you will be turned away.     Vaccine appointments are being added as they become available. Please check your [employer] account frequently for availability.  If you have technical difficulty using [employer], call [phone] for assistance.     You can learn more about the state's phased approach to administering the vaccine, with details on each phase, here.      Phase 1b is the next group that will get vaccinated and includes frontline essential workers and adults 75 years of age and older. When we are able to start vaccinating this group, we will share that information on our website. Check this website to stay up to date on COVID-19 vaccination information.        Did you know?      You can schedule a video visit for follow-up appointments as well as future appointments for certain conditions.  Please see the below link.     [email or web address]    If you have not already done so,  I encourage you to sign up for [employer] ([email or web address]).  This will allow you to review your results, securely communicate with a provider, and schedule virtual visits as well.

## 2021-03-25 ENCOUNTER — ANCILLARY PROCEDURE (OUTPATIENT)
Dept: CARDIOLOGY | Facility: CLINIC | Age: 86
End: 2021-03-25
Attending: INTERNAL MEDICINE
Payer: COMMERCIAL

## 2021-03-25 DIAGNOSIS — Z95.0 CARDIAC PACEMAKER IN SITU: ICD-10-CM

## 2021-03-25 PROCEDURE — 93296 REM INTERROG EVL PM/IDS: CPT | Performed by: INTERNAL MEDICINE

## 2021-03-25 PROCEDURE — 93294 REM INTERROG EVL PM/LDLS PM: CPT | Performed by: INTERNAL MEDICINE

## 2021-03-26 LAB
MDC_IDC_LEAD_IMPLANT_DT: NORMAL
MDC_IDC_LEAD_LOCATION: NORMAL
MDC_IDC_LEAD_LOCATION_DETAIL_1: NORMAL
MDC_IDC_LEAD_MFG: NORMAL
MDC_IDC_LEAD_MODEL: NORMAL
MDC_IDC_LEAD_POLARITY_TYPE: NORMAL
MDC_IDC_LEAD_SERIAL: NORMAL
MDC_IDC_MSMT_BATTERY_DTM: NORMAL
MDC_IDC_MSMT_BATTERY_REMAINING_LONGEVITY: 180 MO
MDC_IDC_MSMT_BATTERY_RRT_TRIGGER: 2.62
MDC_IDC_MSMT_BATTERY_STATUS: NORMAL
MDC_IDC_MSMT_BATTERY_VOLTAGE: 3.17 V
MDC_IDC_MSMT_LEADCHNL_RV_IMPEDANCE_VALUE: 266 OHM
MDC_IDC_MSMT_LEADCHNL_RV_IMPEDANCE_VALUE: 513 OHM
MDC_IDC_MSMT_LEADCHNL_RV_PACING_THRESHOLD_AMPLITUDE: 1 V
MDC_IDC_MSMT_LEADCHNL_RV_PACING_THRESHOLD_PULSEWIDTH: 0.4 MS
MDC_IDC_MSMT_LEADCHNL_RV_SENSING_INTR_AMPL: 6.88 MV
MDC_IDC_MSMT_LEADCHNL_RV_SENSING_INTR_AMPL: 6.88 MV
MDC_IDC_PG_IMPLANT_DTM: NORMAL
MDC_IDC_PG_MFG: NORMAL
MDC_IDC_PG_MODEL: NORMAL
MDC_IDC_PG_SERIAL: NORMAL
MDC_IDC_PG_TYPE: NORMAL
MDC_IDC_SESS_CLINIC_NAME: NORMAL
MDC_IDC_SESS_DTM: NORMAL
MDC_IDC_SESS_TYPE: NORMAL
MDC_IDC_SET_BRADY_HYSTRATE: NORMAL
MDC_IDC_SET_BRADY_LOWRATE: 50 {BEATS}/MIN
MDC_IDC_SET_BRADY_MODE: NORMAL
MDC_IDC_SET_LEADCHNL_RV_PACING_AMPLITUDE: 2 V
MDC_IDC_SET_LEADCHNL_RV_PACING_ANODE_ELECTRODE_1: NORMAL
MDC_IDC_SET_LEADCHNL_RV_PACING_ANODE_LOCATION_1: NORMAL
MDC_IDC_SET_LEADCHNL_RV_PACING_CAPTURE_MODE: NORMAL
MDC_IDC_SET_LEADCHNL_RV_PACING_CATHODE_ELECTRODE_1: NORMAL
MDC_IDC_SET_LEADCHNL_RV_PACING_CATHODE_LOCATION_1: NORMAL
MDC_IDC_SET_LEADCHNL_RV_PACING_POLARITY: NORMAL
MDC_IDC_SET_LEADCHNL_RV_PACING_PULSEWIDTH: 0.4 MS
MDC_IDC_SET_LEADCHNL_RV_SENSING_ANODE_ELECTRODE_1: NORMAL
MDC_IDC_SET_LEADCHNL_RV_SENSING_ANODE_LOCATION_1: NORMAL
MDC_IDC_SET_LEADCHNL_RV_SENSING_CATHODE_ELECTRODE_1: NORMAL
MDC_IDC_SET_LEADCHNL_RV_SENSING_CATHODE_LOCATION_1: NORMAL
MDC_IDC_SET_LEADCHNL_RV_SENSING_POLARITY: NORMAL
MDC_IDC_SET_LEADCHNL_RV_SENSING_SENSITIVITY: 0.9 MV
MDC_IDC_SET_ZONE_DETECTION_INTERVAL: 360 MS
MDC_IDC_SET_ZONE_TYPE: NORMAL
MDC_IDC_STAT_BRADY_DTM_END: NORMAL
MDC_IDC_STAT_BRADY_DTM_START: NORMAL
MDC_IDC_STAT_BRADY_RV_PERCENT_PACED: 19.6 %
MDC_IDC_STAT_EPISODE_RECENT_COUNT: 0
MDC_IDC_STAT_EPISODE_RECENT_COUNT: 0
MDC_IDC_STAT_EPISODE_RECENT_COUNT_DTM_END: NORMAL
MDC_IDC_STAT_EPISODE_RECENT_COUNT_DTM_END: NORMAL
MDC_IDC_STAT_EPISODE_RECENT_COUNT_DTM_START: NORMAL
MDC_IDC_STAT_EPISODE_RECENT_COUNT_DTM_START: NORMAL
MDC_IDC_STAT_EPISODE_TOTAL_COUNT: 0
MDC_IDC_STAT_EPISODE_TOTAL_COUNT: 2
MDC_IDC_STAT_EPISODE_TOTAL_COUNT_DTM_END: NORMAL
MDC_IDC_STAT_EPISODE_TOTAL_COUNT_DTM_END: NORMAL
MDC_IDC_STAT_EPISODE_TOTAL_COUNT_DTM_START: NORMAL
MDC_IDC_STAT_EPISODE_TOTAL_COUNT_DTM_START: NORMAL
MDC_IDC_STAT_EPISODE_TYPE: NORMAL

## 2021-04-05 ENCOUNTER — PATIENT OUTREACH (OUTPATIENT)
Dept: CARE COORDINATION | Facility: CLINIC | Age: 86
End: 2021-04-05

## 2021-04-05 DIAGNOSIS — I50.9 CONGESTIVE HEART FAILURE (H): Primary | ICD-10-CM

## 2021-04-05 ASSESSMENT — ACTIVITIES OF DAILY LIVING (ADL): DEPENDENT_IADLS:: INDEPENDENT

## 2021-04-05 NOTE — PROGRESS NOTES
Clinic Care Coordination Contact  [facility]/Voicemail    Referral Source: IP Report  Date of Admission:  3/15/2021  Date of Discharge:  3/18/2021  Discharging Provider: Sivakumar Lewis MD     Discharge Diagnoses   Principal Problem:    Pneumonia, RUL, RML, RLL       Acute respiratory failure with hypoxia   Clinical Data: Care Coordinator Outreach  Outreach attempted x 1.  Left message on patient's voicemail with call back information and requested return call.  Plan: . Care Coordinator will try to reach patient again in 10 business days.  [facility]   Mckenna Daniels RN, Care Coordinator   [facility] and [place]   E-mail [email or web address]   [phone]

## 2021-04-14 DIAGNOSIS — Z95.2 S/P TAVR (TRANSCATHETER AORTIC VALVE REPLACEMENT): Primary | ICD-10-CM

## 2021-04-19 ENCOUNTER — TRANSFERRED RECORDS (OUTPATIENT)
Dept: HEALTH INFORMATION MANAGEMENT | Facility: CLINIC | Age: 86
End: 2021-04-19

## 2021-04-19 ENCOUNTER — PATIENT OUTREACH (OUTPATIENT)
Dept: CARE COORDINATION | Facility: CLINIC | Age: 86
End: 2021-04-19

## 2021-04-19 DIAGNOSIS — J18.9 PNEUMONIA: Primary | ICD-10-CM

## 2021-04-19 ASSESSMENT — ACTIVITIES OF DAILY LIVING (ADL): DEPENDENT_IADLS:: INDEPENDENT

## 2021-04-19 NOTE — PROGRESS NOTES
Clinic Care Coordination Contact  [facility]/Voicemail    Referral Source: IP Report  Date of Admission:  3/15/2021  Date of Discharge:  3/18/2021  Discharging Provider: Sivakumar Lewis MD     Discharge Diagnoses   Principal Problem:    Pneumonia, RUL, RML, RLL     Clinical Data: Care Coordinator Outreach  Outreach attempted x 2.  Left message on Willi/son voicemail with call back information and requested return call.  Plan:  Care Coordinator will do no further outreaches at this time.  [facility]   Mckenna Daniels RN, Care Coordinator   [facility] and [place]   E-mail [email or web address]   [phone]

## 2021-04-27 DIAGNOSIS — I50.31 ACUTE DIASTOLIC HEART FAILURE (H): ICD-10-CM

## 2021-05-17 DIAGNOSIS — E78.5 HYPERLIPIDEMIA WITH TARGET LDL LESS THAN 100: ICD-10-CM

## 2021-05-17 RX ORDER — SIMVASTATIN 40 MG
40 TABLET ORAL AT BEDTIME
Qty: 90 TABLET | Refills: 3 | Status: SHIPPED | OUTPATIENT
Start: 2021-05-17 | End: 2022-05-05

## 2021-06-07 ENCOUNTER — HOSPITAL ENCOUNTER (OUTPATIENT)
Dept: CARDIOLOGY | Facility: CLINIC | Age: 86
Discharge: HOME | End: 2021-06-07
Attending: INTERNAL MEDICINE | Admitting: INTERNAL MEDICINE
Payer: COMMERCIAL

## 2021-06-07 DIAGNOSIS — Z95.2 S/P TAVR (TRANSCATHETER AORTIC VALVE REPLACEMENT): ICD-10-CM

## 2021-06-07 DIAGNOSIS — I50.31 ACUTE DIASTOLIC HEART FAILURE (H): ICD-10-CM

## 2021-06-07 LAB
ANION GAP SERPL CALCULATED.3IONS-SCNC: 5 MMOL/L (ref 3–14)
BUN SERPL-MCNC: 54 MG/DL (ref 7–30)
CALCIUM SERPL-MCNC: 8.9 MG/DL (ref 8.5–10.1)
CHLORIDE SERPL-SCNC: 104 MMOL/L (ref 94–109)
CO2 SERPL-SCNC: 27 MMOL/L (ref 20–32)
CREAT SERPL-MCNC: 1.72 MG/DL (ref 0.66–1.25)
ERYTHROCYTE [DISTWIDTH] IN BLOOD BY AUTOMATED COUNT: 16.9 % (ref 10–15)
GFR SERPL CREATININE-BSD FRML MDRD: 35 ML/MIN/{1.73_M2}
GLUCOSE SERPL-MCNC: 97 MG/DL (ref 70–99)
HCT VFR BLD AUTO: 33.5 % (ref 40–53)
HGB BLD-MCNC: 10.3 G/DL (ref 13.3–17.7)
MCH RBC QN AUTO: 21.1 PG (ref 26.5–33)
MCHC RBC AUTO-ENTMCNC: 30.7 G/DL (ref 31.5–36.5)
MCV RBC AUTO: 69 FL (ref 78–100)
PLATELET # BLD AUTO: 116 10E9/L (ref 150–450)
POTASSIUM SERPL-SCNC: 4.3 MMOL/L (ref 3.4–5.3)
RBC # BLD AUTO: 4.89 10E12/L (ref 4.4–5.9)
SODIUM SERPL-SCNC: 136 MMOL/L (ref 133–144)
WBC # BLD AUTO: 3.2 10E9/L (ref 4–11)

## 2021-06-07 PROCEDURE — 36415 COLL VENOUS BLD VENIPUNCTURE: CPT | Performed by: INTERNAL MEDICINE

## 2021-06-07 PROCEDURE — 93306 TTE W/DOPPLER COMPLETE: CPT

## 2021-06-07 PROCEDURE — T1013 SIGN LANG/ORAL INTERPRETER: HCPCS | Mod: U3

## 2021-06-07 PROCEDURE — 93306 TTE W/DOPPLER COMPLETE: CPT | Mod: 26 | Performed by: INTERNAL MEDICINE

## 2021-06-07 PROCEDURE — 85027 COMPLETE CBC AUTOMATED: CPT | Performed by: INTERNAL MEDICINE

## 2021-06-07 PROCEDURE — 80048 BASIC METABOLIC PNL TOTAL CA: CPT | Performed by: INTERNAL MEDICINE

## 2021-06-08 ENCOUNTER — OFFICE VISIT (OUTPATIENT)
Dept: CARDIOLOGY | Facility: CLINIC | Age: 86
End: 2021-06-08
Attending: INTERNAL MEDICINE
Payer: COMMERCIAL

## 2021-06-08 VITALS
HEART RATE: 57 BPM | WEIGHT: 151.6 LBS | BODY MASS INDEX: 23.79 KG/M2 | HEIGHT: 67 IN | DIASTOLIC BLOOD PRESSURE: 71 MMHG | SYSTOLIC BLOOD PRESSURE: 127 MMHG

## 2021-06-08 DIAGNOSIS — I50.31 ACUTE DIASTOLIC HEART FAILURE (H): ICD-10-CM

## 2021-06-08 DIAGNOSIS — Z95.2 S/P TAVR (TRANSCATHETER AORTIC VALVE REPLACEMENT): Primary | ICD-10-CM

## 2021-06-08 DIAGNOSIS — E78.5 HYPERLIPIDEMIA WITH TARGET LDL LESS THAN 100: ICD-10-CM

## 2021-06-08 PROCEDURE — 99214 OFFICE O/P EST MOD 30 MIN: CPT | Performed by: INTERNAL MEDICINE

## 2021-06-08 PROCEDURE — T1013 SIGN LANG/ORAL INTERPRETER: HCPCS | Mod: U3 | Performed by: INTERNAL MEDICINE

## 2021-06-08 RX ORDER — TORSEMIDE 10 MG/1
10 TABLET ORAL DAILY
COMMUNITY
End: 2021-06-08

## 2021-06-08 RX ORDER — TORSEMIDE 10 MG/1
10 TABLET ORAL DAILY
Qty: 90 TABLET | Refills: 3 | Status: SHIPPED | OUTPATIENT
Start: 2021-06-08 | End: 2021-06-22

## 2021-06-08 ASSESSMENT — MIFFLIN-ST. JEOR: SCORE: 1326.28

## 2021-06-08 NOTE — LETTER
6/8/2021    Dany Rodriguez MD, MD  [address] [place] [id number]    RE: Shiraz Ingram       Dear Colleague,    I had the pleasure of seeing Shiraz Ingram in the [facility].    HPI and Plan:   Mr. Ingram returns for follow-up of valvular heart disease, arrhythmias and aortic valve replacement.  He is accompanied by an  as he has both very profound hearing loss    1.  Severe aortic stenosis status post TAVR 07/2020 with a 29 mm Medtronic Evolut Pro Plus valve.  There is trace to mild paravalvular leak but otherwise valve is functioning fine.  2.  Complete heart block post TAVR with permanent pacemaker implanted.   3.  Coronary artery disease with history of CABG in 2002.  This was a LIMA to LAD and saphenous vein graft to the OM as well as saphenous vein graft to the right PDA and the right PL and a Y graft.  He has a lesion of the right PDA at the touchdown of the graft.   4.  Heart failure with borderline to mild left ventricular systolic dysfunction, moderate RV systolic dysfunction moderate pulmonary hypertension.  5.  Stage III chronic renal failure   6.  Upper GI bleed  7.  Anemia   8.  Hypertension.  9.  Venous stasis.  10.  Asthma    The past year has been difficult for him as he has had multiple admissions after TAVR with GI bleed diastolic heart failure pneumonia.  I am very happy to see he has not had any further admissions for the past 2 and half to 3 months.   He feels that his health is almost returned to baseline.  He is able to eat lead and independent quietly active life.  He denies chest pain shortness of breath PND orthopnea.  He does have trace to 1+ ankle swelling and he tells me that this gets much worse in the hot weather.  I am sure this is due to venous stasis as opposed to heart failure.    Physical examination reveals a clear chest normal JVP no audible diastolic murmurs though he does have soft systolic  murmur.    Most recent echocardiogram personally reviewed.  Findings are as stated above with no major changes.    Creatinine is stable around 1.72.  Hemoglobin is 10.3 and it is slowly going higher.  He is last LDL was 54.    I am cautiously optimistic that his health will slowly improve and he will be able to avoid admissions over the summer months.    I have scheduled him to follow-up with my colleague Ameecaleb Rocha in 4 to 6 months time and I will see him in 9 to 12 months time.    Orders Placed This Encounter   Procedures     Follow-Up with Cardiac Advanced Practice Provider     Follow-Up with Cardiologist       Orders Placed This Encounter   Medications     DISCONTD: torsemide (DEMADEX) 10 MG tablet     Sig: Take 10 mg by mouth daily     torsemide (DEMADEX) 10 MG tablet     Sig: Take 1 tablet (10 mg) by mouth daily     Dispense:  90 tablet     Refill:  3       Encounter Diagnoses   Name Primary?     Acute diastolic heart failure (H)      S/P TAVR (transcatheter aortic valve replacement) Yes     Hyperlipidemia with target LDL less than 100        CURRENT MEDICATIONS:  Current Outpatient Medications   Medication Sig Dispense Refill     albuterol (PROAIR HFA/PROVENTIL HFA/VENTOLIN HFA) 108 (90 Base) MCG/ACT inhaler INHALE 1 TO 2 PUFFS BY MOUTH EVERY 4 TO 6 HOURS AS NEEDED 18 g 2     aspirin (ASA) 81 MG EC tablet Take 81 mg by mouth daily       budesonide (PULMICORT) 0.5 MG/2ML nebulizer solution Take 2 mLs (0.5 mg) by nebulization 2 times daily 120 mL 0     calcium carbonate 600 mg-vitamin D 400 units (CALTRATE) 600-400 MG-UNIT per tablet Take 1 tablet by mouth daily       dabigatran ANTICOAGULANT (PRADAXA) 150 MG capsule Take 1 capsule (150 mg) by mouth 2 times daily 180 capsule 3     ferrous sulfate (IRON) 325 (65 Fe) MG tablet TAKE 1 TABLET(325 MG) BY MOUTH TWICE DAILY 180 tablet 0     hydrALAZINE (APRESOLINE) 25 MG tablet Take 0.5 tablets (12.5 mg) by mouth 3 times daily 90 tablet 3     ipratropium - albuterol  0.5 mg/2.5 mg/3 mL (DUONEB) 0.5-2.5 (3) MG/3ML nebulization Inhale 1 vial (3 mLs) into the lungs 4 times daily 360 mL 11     isosorbide mononitrate (IMDUR) 30 MG 24 hr tablet Take by mouth daily  90 tablet 3     magnesium chloride (MAG64) 535 (64 Mg) MG TBEC CR tablet Take 1 tablet (535 mg) by mouth 2 times daily 180 tablet 3     meclizine (ANTIVERT) 12.5 MG tablet Take 1 tablet (12.5 mg) by mouth 3 times daily as needed for dizziness 90 tablet 1     melatonin 3 MG tablet Take 1 tablet (3 mg) by mouth nightly as needed for sleep 30 tablet 0     metoprolol tartrate (LOPRESSOR) 25 MG tablet Take 1 tablet (25 mg) by mouth 2 times daily 180 tablet 2     montelukast (SINGULAIR) 10 MG tablet Take 1 tablet (10 mg) by mouth daily 90 tablet 0     multivitamin, therapeutic (THERA-VIT) TABS tablet Take 1 tablet by mouth every evening        nitroGLYcerin (NITROSTAT) 0.4 MG sublingual tablet For chest pain place 1 tablet under the tongue every 5 minutes for 3 doses. If symptoms persist 5 minutes after 1st dose call 911. 12 tablet 0     pantoprazole (PROTONIX) 40 MG EC tablet TAKE ONE TABLET BY MOUTH TWICE A DAY BEFORE MEALS 180 tablet 0     simvastatin (ZOCOR) 40 MG tablet Take 1 tablet (40 mg) by mouth At Bedtime 90 tablet 3     torsemide (DEMADEX) 10 MG tablet Take 1 tablet (10 mg) by mouth daily 90 tablet 3       ALLERGIES   No Known Allergies    PAST MEDICAL HISTORY:  Past Medical History:   Diagnosis Date     Allergic rhinitis, cause unspecified      Anemia, unspecified      Aortic stenosis S/P TAVR      Benign neoplasm of colon 1999    Ademonatous polyp     CHF 2nd to TAVR -- S/P Pacemaker 12/2020     CKD (chronic kidney disease) stage 3, GFR 30-59 ml/min 05/12/2011     Community acquired pneumonia 9/29/2020     Coronary atherosclerosis of unspecified type of vessel, native or graft     s/p CABG 2002     Esophageal ulcer w UGI bleed 05/24/2020    Had EGD adn caurtery 5/24/20, colonoscopy with diverticulosis then      Hyperlipidemia LDL goal < 100      Hypertension goal BP (blood pressure) < 140/90      Localized osteoarthrosis not specified whether primary or secondary, lower leg     left knee     Mild persistent asthma without complication 1/11/2016     Moderate persistent asthma      Persistent atrial fibrillation (H)      Unspecified hearing loss        PAST SURGICAL HISTORY:  Past Surgical History:   Procedure Laterality Date     CARDIAC SURGERY       COLONOSCOPY N/A 5/26/2020    Procedure: COLONOSCOPY;  Surgeon: Ignacio Fournier MD;  Location:  GI     CV ANGIOGRAM CORONARY GRAFT N/A 4/24/2020    Procedure: Angiogram Coronary Graft;  Surgeon: Addison Willard MD;  Location:  HEART CARDIAC CATH LAB     CV CORONARY ANGIOGRAM N/A 4/24/2020    Procedure: Coronary Angiogram;  Surgeon: Addison Willard MD;  Location:  HEART CARDIAC CATH LAB     CV RIGHT HEART CATH MEASUREMENTS RECORDED N/A 4/24/2020    Procedure: Right Heart Cath;  Surgeon: Addison Willard MD;  Location:  HEART CARDIAC CATH LAB     CV TRANSCATHETER AORTIC VALVE REPLACEMENT N/A 7/14/2020    Procedure: Transcatheter Aortic Valve Replacement;  Surgeon: Soraida Monet MD;  Location:  HEART CARDIAC CATH LAB     EP PACEMAKER N/A 7/15/2020    Procedure: EP Pacemaker;  Surgeon: Tommie Sauer MD;  Location:  HEART CARDIAC CATH LAB     HC COLONOSCOPY THRU STOMA W BIOPSY/CAUTERY TUMOR/POLYP/LESION  5/03    Dr. Dow, Q 5 years     HC COLONOSCOPY THRU STOMA W BIOPSY/CAUTERY TUMOR/POLYP/LESION  12/199    Dr. Dow, one adenomatous polyp     HC ESOPHAGOSCOPY, DIAGNOSTIC  5/03    Dr. Dow, lower esophageal ring & mild gastritis     HERNIORRHAPHY INGUINAL Right 9/26/2016    Procedure: HERNIORRHAPHY INGUINAL;  Surgeon: Alexis Alexandra MD;  Location: [facility]     LAPAROSCOPIC CHOLECYSTECTOMY  12/03    for biliary sludge     ZZC NONSPECIFIC PROCEDURE  5/02    Coronary artery bypass       FAMILY HISTORY:  Family History    Problem Relation Age of Onset     C.A.DKendall Father      Cancer Mother         breast cancer,  of pneumonia     Cerebrovascular Disease Son      CABG Son      Family History Negative Child      Family History Negative Sister      Heart Disease Brother        SOCIAL HISTORY:  Social History     Socioeconomic History     Marital status:      Spouse name: Kailey     Number of children: 3     Years of education: None     Highest education level: None   Occupational History     Occupation: [employer]     Employer: RETIRED   Social Needs     Financial resource strain: Not hard at all     Food insecurity     Worry: Never true     Inability: Never true     Transportation needs     Medical: No     Non-medical: No   Tobacco Use     Smoking status: Never Smoker     Smokeless tobacco: Never Used     Tobacco comment: smoked for a week in    Substance and Sexual Activity     Alcohol use: Not Currently     Alcohol/week: 0.0 standard drinks     Drug use: No     Sexual activity: Yes     Partners: Female   Lifestyle     Physical activity     Days per week: 4 days     Minutes per session: 30 min     Stress: None   Relationships     Social connections     Talks on phone: More than three times a week     Gets together: None     Attends [employer] service: None     Active member of club or organization: None     Attends meetings of clubs or organizations: None     Relationship status:      Intimate partner violence     Fear of current or ex partner: None     Emotionally abused: None     Physically abused: None     Forced sexual activity: None   Other Topics Concern      Service No     Blood Transfusions No     Caffeine Concern Not Asked     Occupational Exposure Not Asked     Hobby Hazards Not Asked     Sleep Concern Not Asked     Stress Concern Not Asked     Weight Concern Not Asked     Special Diet Not Asked     Back Care Not Asked     Exercise Yes     Bike Helmet Not Asked     Seat Belt Yes     Self-Exams No  "    Parent/sibling w/ CABG, MI or angioplasty before 65F 55M? Yes   Social History Narrative    Balanced Diet - Yes    Osteoporosis Preventative measures-  Dairy servings per day: 2 to 3 servings daily    Regular Exercise -  Yes Describe walks 1.5 mile daily     Dental Exam up - YES - Date: 2 years ago    Eye Exam - YES - Date: 1 year ago    Self Testicular Exam -  No, handout given    Do you have any concerns about STD's -  No    Abuse: Current or Past (Physical, Sexual or Emotional)- No    Do you feel safe in your environment - Yes    Guns stored in the home - Yes, locked    Sunscreen used - No    Seatbelts used - Yes    Lipids - YES - Date: 3-09    Glucose -  YES - Date: 3-09    Colon Cancer Screening - Colonoscopy 3-08(date completed)    Hemoccults - UNKNOWN    PSA - YES - Date: 11-07    Digital Rectal Exam - UNKNOWN    Immunizations reviewed and up to date - Yes, td 1-2005, had shingles vaccine    5-28-09  JANEY Patel CMA                       Review of Systems:  Skin:  Negative     Eyes:  Positive for glasses  ENT:  Positive for deafness  Respiratory:  Negative    Cardiovascular:  Negative    Gastroenterology: Negative    Genitourinary:  Negative    Musculoskeletal:  Negative    Neurologic:  Negative    Psychiatric:  Negative    Heme/Lymph/Imm:  Negative    Endocrine:  Negative      Physical Exam:  Vitals: /71   Pulse 57   Ht 1.702 m (5' 7\")   Wt 68.8 kg (151 lb 9.6 oz)   BMI 23.74 kg/m      Constitutional:  in no acute distress        Skin:  warm and dry to the touch venous stasis changes pacemaker incision in the left infraclavicular area was well-healed      Head:  normocephalic, no masses or lesions        Eyes:  pupils equal and round, conjunctivae and lids unremarkable, sclera white, no xanthalasma, EOMS intact, no nystagmus        Lymph:No Cervical lymphadenopathy present     ENT:  no pallor or cyanosis, dentition good        Neck:  JVP normal        Respiratory:  normal breath sounds, clear to " auscultation, normal A-P diameter, normal symmetry, normal respiratory excursion, no use of accessory muscles         Cardiac: apical impulse not displaced;normal S1 and S2 irregular rhythm     systolic murmur grade 1        pulses below the femoral arteries are diminished                                      GI:  abdomen soft, non-tender, BS normoactive, no mass, no HSM, no bruits        Extremities and Muscular Skeletal:  no deformities, clubbing, cyanosis, erythema observed   bilateral LE edema;trace;1+          Neurological:  no gross motor deficits        Psych:  Alert and Oriented x 3        Recent Lab Results:  LIPID RESULTS:  Lab Results   Component Value Date    CHOL 119 06/01/2020    HDL 48 06/01/2020    LDL 54 06/01/2020    TRIG 87 06/01/2020    CHOLHDLRATIO 3.0 07/07/2015       LIVER ENZYME RESULTS:  Lab Results   Component Value Date    AST 25 03/15/2021    ALT 38 03/15/2021       CBC RESULTS:  Lab Results   Component Value Date    WBC 3.2 (L) 06/07/2021    RBC 4.89 06/07/2021    HGB 10.3 (L) 06/07/2021    HCT 33.5 (L) 06/07/2021    MCV 69 (L) 06/07/2021    MCH 21.1 (L) 06/07/2021    MCHC 30.7 (L) 06/07/2021    RDW 16.9 (H) 06/07/2021     (L) 06/07/2021       BMP RESULTS:  Lab Results   Component Value Date     06/07/2021    POTASSIUM 4.3 06/07/2021    CHLORIDE 104 06/07/2021    CO2 27 06/07/2021    ANIONGAP 5 06/07/2021    GLC 97 06/07/2021    BUN 54 (H) 06/07/2021    CR 1.72 (H) 06/07/2021    GFRESTIMATED 35 (L) 06/07/2021    GFRESTBLACK 41 (L) 06/07/2021    AYALA 8.9 06/07/2021        A1C RESULTS:  No results found for: A1C    INR RESULTS:  Lab Results   Component Value Date    INR 1.51 (H) 03/15/2021    INR 1.21 (H) 07/13/2020     Thank you for allowing me to participate in the care of your patient.      Sincerely,     DR SANDRA ASTUDILLO MD     [facility]    cc:   Dany Rodriguez MD  [address] [place] [address]  [phone]

## 2021-06-08 NOTE — PROGRESS NOTES
HPI and Plan:   Mr. Ingram returns for follow-up of valvular heart disease, arrhythmias and aortic valve replacement.  He is accompanied by an  as he has both very profound hearing loss    1.  Severe aortic stenosis status post TAVR 07/2020 with a 29 mm Medtronic Evolut Pro Plus valve.  There is trace to mild paravalvular leak but otherwise valve is functioning fine.  2.  Complete heart block post TAVR with permanent pacemaker implanted.   3.  Coronary artery disease with history of CABG in 2002.  This was a LIMA to LAD and saphenous vein graft to the OM as well as saphenous vein graft to the right PDA and the right PL and a Y graft.  He has a lesion of the right PDA at the touchdown of the graft.   4.  Heart failure with borderline to mild left ventricular systolic dysfunction, moderate RV systolic dysfunction moderate pulmonary hypertension.  5.  Stage III chronic renal failure   6.  Upper GI bleed  7.  Anemia   8.  Hypertension.  9.  Venous stasis.  10.  Asthma    The past year has been difficult for him as he has had multiple admissions after TAVR with GI bleed diastolic heart failure pneumonia.  I am very happy to see he has not had any further admissions for the past 2 and half to 3 months.   He feels that his health is almost returned to baseline.  He is able to eat lead and independent quietly active life.  He denies chest pain shortness of breath PND orthopnea.  He does have trace to 1+ ankle swelling and he tells me that this gets much worse in the hot weather.  I am sure this is due to venous stasis as opposed to heart failure.    Physical examination reveals a clear chest normal JVP no audible diastolic murmurs though he does have soft systolic murmur.    Most recent echocardiogram personally reviewed.  Findings are as stated above with no major changes.    Creatinine is stable around 1.72.  Hemoglobin is 10.3 and it is slowly going higher.  He is last LDL was 54.    I am cautiously optimistic  that his health will slowly improve and he will be able to avoid admissions over the summer months.    I have scheduled him to follow-up with my colleague Amee Aj in 4 to 6 months time and I will see him in 9 to 12 months time.    Orders Placed This Encounter   Procedures     Follow-Up with Cardiac Advanced Practice Provider     Follow-Up with Cardiologist       Orders Placed This Encounter   Medications     DISCONTD: torsemide (DEMADEX) 10 MG tablet     Sig: Take 10 mg by mouth daily     torsemide (DEMADEX) 10 MG tablet     Sig: Take 1 tablet (10 mg) by mouth daily     Dispense:  90 tablet     Refill:  3       Encounter Diagnoses   Name Primary?     Acute diastolic heart failure (H)      S/P TAVR (transcatheter aortic valve replacement) Yes     Hyperlipidemia with target LDL less than 100        CURRENT MEDICATIONS:  Current Outpatient Medications   Medication Sig Dispense Refill     albuterol (PROAIR HFA/PROVENTIL HFA/VENTOLIN HFA) 108 (90 Base) MCG/ACT inhaler INHALE 1 TO 2 PUFFS BY MOUTH EVERY 4 TO 6 HOURS AS NEEDED 18 g 2     aspirin (ASA) 81 MG EC tablet Take 81 mg by mouth daily       budesonide (PULMICORT) 0.5 MG/2ML nebulizer solution Take 2 mLs (0.5 mg) by nebulization 2 times daily 120 mL 0     calcium carbonate 600 mg-vitamin D 400 units (CALTRATE) 600-400 MG-UNIT per tablet Take 1 tablet by mouth daily       dabigatran ANTICOAGULANT (PRADAXA) 150 MG capsule Take 1 capsule (150 mg) by mouth 2 times daily 180 capsule 3     ferrous sulfate (IRON) 325 (65 Fe) MG tablet TAKE 1 TABLET(325 MG) BY MOUTH TWICE DAILY 180 tablet 0     hydrALAZINE (APRESOLINE) 25 MG tablet Take 0.5 tablets (12.5 mg) by mouth 3 times daily 90 tablet 3     ipratropium - albuterol 0.5 mg/2.5 mg/3 mL (DUONEB) 0.5-2.5 (3) MG/3ML nebulization Inhale 1 vial (3 mLs) into the lungs 4 times daily 360 mL 11     isosorbide mononitrate (IMDUR) 30 MG 24 hr tablet Take by mouth daily  90 tablet 3     magnesium chloride (MAG64) 535 (64 Mg) MG  TBEC CR tablet Take 1 tablet (535 mg) by mouth 2 times daily 180 tablet 3     meclizine (ANTIVERT) 12.5 MG tablet Take 1 tablet (12.5 mg) by mouth 3 times daily as needed for dizziness 90 tablet 1     melatonin 3 MG tablet Take 1 tablet (3 mg) by mouth nightly as needed for sleep 30 tablet 0     metoprolol tartrate (LOPRESSOR) 25 MG tablet Take 1 tablet (25 mg) by mouth 2 times daily 180 tablet 2     montelukast (SINGULAIR) 10 MG tablet Take 1 tablet (10 mg) by mouth daily 90 tablet 0     multivitamin, therapeutic (THERA-VIT) TABS tablet Take 1 tablet by mouth every evening        nitroGLYcerin (NITROSTAT) 0.4 MG sublingual tablet For chest pain place 1 tablet under the tongue every 5 minutes for 3 doses. If symptoms persist 5 minutes after 1st dose call 911. 12 tablet 0     pantoprazole (PROTONIX) 40 MG EC tablet TAKE ONE TABLET BY MOUTH TWICE A DAY BEFORE MEALS 180 tablet 0     simvastatin (ZOCOR) 40 MG tablet Take 1 tablet (40 mg) by mouth At Bedtime 90 tablet 3     torsemide (DEMADEX) 10 MG tablet Take 1 tablet (10 mg) by mouth daily 90 tablet 3       ALLERGIES   No Known Allergies    PAST MEDICAL HISTORY:  Past Medical History:   Diagnosis Date     Allergic rhinitis, cause unspecified      Anemia, unspecified      Aortic stenosis S/P TAVR      Benign neoplasm of colon 1999    Ademonatous polyp     CHF 2nd to TAVR -- S/P Pacemaker 12/2020     CKD (chronic kidney disease) stage 3, GFR 30-59 ml/min 05/12/2011     Community acquired pneumonia 9/29/2020     Coronary atherosclerosis of unspecified type of vessel, native or graft     s/p CABG 2002     Esophageal ulcer w UGI bleed 05/24/2020    Had EGD adn caurtery 5/24/20, colonoscopy with diverticulosis then     Hyperlipidemia LDL goal < 100      Hypertension goal BP (blood pressure) < 140/90      Localized osteoarthrosis not specified whether primary or secondary, lower leg     left knee     Mild persistent asthma without complication 1/11/2016     Moderate  persistent asthma      Persistent atrial fibrillation (H)      Unspecified hearing loss        PAST SURGICAL HISTORY:  Past Surgical History:   Procedure Laterality Date     CARDIAC SURGERY       COLONOSCOPY N/A 2020    Procedure: COLONOSCOPY;  Surgeon: Ignacio Fournier MD;  Location:  GI     CV ANGIOGRAM CORONARY GRAFT N/A 2020    Procedure: Angiogram Coronary Graft;  Surgeon: Addison Willard MD;  Location:  HEART CARDIAC CATH LAB     CV CORONARY ANGIOGRAM N/A 2020    Procedure: Coronary Angiogram;  Surgeon: Addison Willard MD;  Location:  HEART CARDIAC CATH LAB     CV RIGHT HEART CATH MEASUREMENTS RECORDED N/A 2020    Procedure: Right Heart Cath;  Surgeon: Addison Willard MD;  Location:  HEART CARDIAC CATH LAB     CV TRANSCATHETER AORTIC VALVE REPLACEMENT N/A 2020    Procedure: Transcatheter Aortic Valve Replacement;  Surgeon: Soraida Monet MD;  Location:  HEART CARDIAC CATH LAB     EP PACEMAKER N/A 7/15/2020    Procedure: EP Pacemaker;  Surgeon: Tommie Sauer MD;  Location:  HEART CARDIAC CATH LAB     HC COLONOSCOPY THRU STOMA W BIOPSY/CAUTERY TUMOR/POLYP/LESION      Dr. Dow, Q 5 years     HC COLONOSCOPY THRU STOMA W BIOPSY/CAUTERY TUMOR/POLYP/LESION      Dr. Dow, one adenomatous polyp     HC ESOPHAGOSCOPY, DIAGNOSTIC      Dr. Dow, lower esophageal ring & mild gastritis     HERNIORRHAPHY INGUINAL Right 2016    Procedure: HERNIORRHAPHY INGUINAL;  Surgeon: Alexis Alexandra MD;  Location: [facility]     LAPAROSCOPIC CHOLECYSTECTOMY      for biliary sludge     Z NONSPECIFIC PROCEDURE      Coronary artery bypass       FAMILY HISTORY:  Family History   Problem Relation Age of Onset     C.A.D. Father      Cancer Mother         breast cancer,  of pneumonia     Cerebrovascular Disease Son      CABG Son      Family History Negative Child      Family History Negative Sister      Heart Disease Brother         SOCIAL HISTORY:  Social History     Socioeconomic History     Marital status:      Spouse name: Kailey     Number of children: 3     Years of education: None     Highest education level: None   Occupational History     Occupation: [email or web address]     Employer: RETIRED   Social Needs     Financial resource strain: Not hard at all     Food insecurity     Worry: Never true     Inability: Never true     Transportation needs     Medical: No     Non-medical: No   Tobacco Use     Smoking status: Never Smoker     Smokeless tobacco: Never Used     Tobacco comment: smoked for a week in 20's   Substance and Sexual Activity     Alcohol use: Not Currently     Alcohol/week: 0.0 standard drinks     Drug use: No     Sexual activity: Yes     Partners: Female   Lifestyle     Physical activity     Days per week: 4 days     Minutes per session: 30 min     Stress: None   Relationships     Social connections     Talks on phone: More than three times a week     Gets together: None     Attends Quaker service: None     Active member of club or organization: None     Attends meetings of clubs or organizations: None     Relationship status:      Intimate partner violence     Fear of current or ex partner: None     Emotionally abused: None     Physically abused: None     Forced sexual activity: None   Other Topics Concern      Service No     Blood Transfusions No     Caffeine Concern Not Asked     Occupational Exposure Not Asked     Hobby Hazards Not Asked     Sleep Concern Not Asked     Stress Concern Not Asked     Weight Concern Not Asked     Special Diet Not Asked     Back Care Not Asked     Exercise Yes     Bike Helmet Not Asked     Seat Belt Yes     Self-Exams No     Parent/sibling w/ CABG, MI or angioplasty before 65F 55M? Yes   Social History Narrative    Balanced Diet - Yes    Osteoporosis Preventative measures-  Dairy servings per day: 2 to 3 servings daily    Regular Exercise -  Yes Describe walks 1.5 mile  "daily     Dental Exam up - YES - Date: 2 years ago    Eye Exam - YES - Date: 1 year ago    Self Testicular Exam -  No, handout given    Do you have any concerns about STD's -  No    Abuse: Current or Past (Physical, Sexual or Emotional)- No    Do you feel safe in your environment - Yes    Guns stored in the home - Yes, locked    Sunscreen used - No    Seatbelts used - Yes    Lipids - YES - Date: 3-09    Glucose -  YES - Date: 3-09    Colon Cancer Screening - Colonoscopy 3-08(date completed)    Hemoccults - UNKNOWN    PSA - YES - Date: 11-07    Digital Rectal Exam - UNKNOWN    Immunizations reviewed and up to date - Yes, td 1-2005, had shingles vaccine    5-28-09  JANEY Patel CMA                       Review of Systems:  Skin:  Negative     Eyes:  Positive for glasses  ENT:  Positive for deafness  Respiratory:  Negative    Cardiovascular:  Negative    Gastroenterology: Negative    Genitourinary:  Negative    Musculoskeletal:  Negative    Neurologic:  Negative    Psychiatric:  Negative    Heme/Lymph/Imm:  Negative    Endocrine:  Negative      Physical Exam:  Vitals: /71   Pulse 57   Ht 1.702 m (5' 7\")   Wt 68.8 kg (151 lb 9.6 oz)   BMI 23.74 kg/m      Constitutional:  in no acute distress        Skin:  warm and dry to the touch venous stasis changes pacemaker incision in the left infraclavicular area was well-healed      Head:  normocephalic, no masses or lesions        Eyes:  pupils equal and round, conjunctivae and lids unremarkable, sclera white, no xanthalasma, EOMS intact, no nystagmus        Lymph:No Cervical lymphadenopathy present     ENT:  no pallor or cyanosis, dentition good        Neck:  JVP normal        Respiratory:  normal breath sounds, clear to auscultation, normal A-P diameter, normal symmetry, normal respiratory excursion, no use of accessory muscles         Cardiac: apical impulse not displaced;normal S1 and S2 irregular rhythm     systolic murmur grade 1        pulses below the femoral " arteries are diminished                                      GI:  abdomen soft, non-tender, BS normoactive, no mass, no HSM, no bruits        Extremities and Muscular Skeletal:  no deformities, clubbing, cyanosis, erythema observed   bilateral LE edema;trace;1+          Neurological:  no gross motor deficits        Psych:  Alert and Oriented x 3        Recent Lab Results:  LIPID RESULTS:  Lab Results   Component Value Date    CHOL 119 06/01/2020    HDL 48 06/01/2020    LDL 54 06/01/2020    TRIG 87 06/01/2020    CHOLHDLRATIO 3.0 07/07/2015       LIVER ENZYME RESULTS:  Lab Results   Component Value Date    AST 25 03/15/2021    ALT 38 03/15/2021       CBC RESULTS:  Lab Results   Component Value Date    WBC 3.2 (L) 06/07/2021    RBC 4.89 06/07/2021    HGB 10.3 (L) 06/07/2021    HCT 33.5 (L) 06/07/2021    MCV 69 (L) 06/07/2021    MCH 21.1 (L) 06/07/2021    MCHC 30.7 (L) 06/07/2021    RDW 16.9 (H) 06/07/2021     (L) 06/07/2021       BMP RESULTS:  Lab Results   Component Value Date     06/07/2021    POTASSIUM 4.3 06/07/2021    CHLORIDE 104 06/07/2021    CO2 27 06/07/2021    ANIONGAP 5 06/07/2021    GLC 97 06/07/2021    BUN 54 (H) 06/07/2021    CR 1.72 (H) 06/07/2021    GFRESTIMATED 35 (L) 06/07/2021    GFRESTBLACK 41 (L) 06/07/2021    AYALA 8.9 06/07/2021        A1C RESULTS:  No results found for: A1C    INR RESULTS:  Lab Results   Component Value Date    INR 1.51 (H) 03/15/2021    INR 1.21 (H) 07/13/2020           AYSHA Rodriguez MD  [address] [place] [id number]

## 2021-06-14 NOTE — NURSING NOTE
Dr. Field saw patient in clinic 6/8/21.  1 year post TAVR  TAVR Coordinator visit:    KCCQ completed by patient.    KCCQ Results:   1a. 4  1b. 1  1c. 1  2. 1  3. 3  4. 3  5. 5  6. 4  7. 4  8a. 4  8b. 4  8c. 3    Patsy Mason, RN  Structural Heart Coordinator  [facility]

## 2021-06-15 NOTE — PROVIDER NOTIFICATION
Pt deaf, speaking ASL. While  not present, use written language for communication. A&O x4. VSS on room air. Tele A fib CVR. Denies CP/pain. Mild SOB, diuresing with lasix. Up independently. Will continue to monitor.       Bill For Surgical Tray: no Billing Type: Third-Party Bill Expected Date Of Service: 06/15/2021

## 2021-06-22 ENCOUNTER — OFFICE VISIT (OUTPATIENT)
Dept: FAMILY MEDICINE | Facility: CLINIC | Age: 86
End: 2021-06-22
Payer: COMMERCIAL

## 2021-06-22 ENCOUNTER — ANCILLARY PROCEDURE (OUTPATIENT)
Dept: GENERAL RADIOLOGY | Facility: CLINIC | Age: 86
End: 2021-06-22
Attending: FAMILY MEDICINE
Payer: COMMERCIAL

## 2021-06-22 VITALS
OXYGEN SATURATION: 95 % | SYSTOLIC BLOOD PRESSURE: 128 MMHG | RESPIRATION RATE: 14 BRPM | BODY MASS INDEX: 24.43 KG/M2 | DIASTOLIC BLOOD PRESSURE: 70 MMHG | WEIGHT: 156 LBS | HEART RATE: 69 BPM

## 2021-06-22 DIAGNOSIS — M25.471 RIGHT ANKLE SWELLING: Primary | ICD-10-CM

## 2021-06-22 DIAGNOSIS — M79.604 PAIN IN RIGHT LEG: ICD-10-CM

## 2021-06-22 DIAGNOSIS — Z95.2 S/P TAVR (TRANSCATHETER AORTIC VALVE REPLACEMENT): ICD-10-CM

## 2021-06-22 DIAGNOSIS — K22.11 ULCER OF ESOPHAGUS WITH BLEEDING: ICD-10-CM

## 2021-06-22 DIAGNOSIS — E78.5 HYPERLIPIDEMIA WITH TARGET LDL LESS THAN 100: ICD-10-CM

## 2021-06-22 DIAGNOSIS — M25.471 RIGHT ANKLE SWELLING: ICD-10-CM

## 2021-06-22 DIAGNOSIS — I50.31 ACUTE DIASTOLIC HEART FAILURE (H): ICD-10-CM

## 2021-06-22 DIAGNOSIS — J45.40 MODERATE PERSISTENT ASTHMA WITHOUT COMPLICATION: ICD-10-CM

## 2021-06-22 PROCEDURE — 99214 OFFICE O/P EST MOD 30 MIN: CPT | Performed by: FAMILY MEDICINE

## 2021-06-22 PROCEDURE — T1013 SIGN LANG/ORAL INTERPRETER: HCPCS | Mod: U3

## 2021-06-22 PROCEDURE — 73610 X-RAY EXAM OF ANKLE: CPT | Mod: RT | Performed by: RADIOLOGY

## 2021-06-22 RX ORDER — PANTOPRAZOLE SODIUM 40 MG/1
TABLET, DELAYED RELEASE ORAL
Qty: 180 TABLET | Refills: 0 | Status: SHIPPED | OUTPATIENT
Start: 2021-06-22 | End: 2021-09-15

## 2021-06-22 RX ORDER — TORSEMIDE 5 MG/1
5 TABLET ORAL DAILY PRN
Qty: 90 TABLET | Refills: 0 | Status: SHIPPED | OUTPATIENT
Start: 2021-06-22 | End: 2021-09-19

## 2021-06-22 RX ORDER — MONTELUKAST SODIUM 10 MG/1
10 TABLET ORAL DAILY
Qty: 90 TABLET | Refills: 0 | Status: SHIPPED | OUTPATIENT
Start: 2021-06-22 | End: 2021-09-17

## 2021-06-22 NOTE — PROGRESS NOTES
Assessment & Plan     Right ankle swelling  Xray negative. Due to pain and swelling despite not having injury we agreed to do an xray. Xray negative for fracture. As he is on blood thinner and on diuretics, we should rule out other etiology and will obtain US. DVT less likely but cant be rule out. Monitor weight due to heart failure history. Other ankle is fine.   - XR Ankle Right G/E 3 Views; Future  - US Lower Extremity Venous Duplex Right; Future    Pain in right leg   See above. Consider podiatry referral if persist  - US Lower Extremity Venous Duplex Right; Future    Acute diastolic heart failure (H)  Monitoring weight. 10mg qam and 5mg if needed.  - torsemide (DEMADEX) 5 MG tablet; Take 1 tablet (5 mg) by mouth daily as needed    S/P TAVR (transcatheter aortic valve replacement)  See above.  - torsemide (DEMADEX) 5 MG tablet; Take 1 tablet (5 mg) by mouth daily as needed    Hyperlipidemia with target LDL less than 100  See above.  - torsemide (DEMADEX) 5 MG tablet; Take 1 tablet (5 mg) by mouth daily as needed    Moderate persistent asthma without complication  Patient is tolerating current medication without any major side effects of concerns and current dose seems reasonable too.  Current medication regime is effective. Continue current treatment without any changes.   - montelukast (SINGULAIR) 10 MG tablet; Take 1 tablet (10 mg) by mouth daily    Ulcer of esophagus with bleeding  Continue protonix.  - pantoprazole (PROTONIX) 40 MG EC tablet; TAKE ONE TABLET BY MOUTH TWICE A DAY BEFORE MEALS     Return in about 3 months (around 9/22/2021).    Dany Rodriguez MD, MD  [facility]    Javon Weldon is a 86 year old who presents for the following health issues     HPI     PT into follow-up on medication, need refills and have right foot exam.    Here with .     Was working last Monday and   Friday stood for 8 hours and   Pain and swelling  More  swelling in the evening.   Goes down overnight.   No known injury.   Already on blood thinner for his heart.     Breathing is fine.     10mg qam daily. 5 mg only if needed and if weight is up. Some weight up lately.          Review of Systems         Objective    /70 (BP Location: Left arm, Patient Position: Sitting, Cuff Size: Adult Regular)   Pulse 69   Resp 14   Wt 70.8 kg (156 lb)   SpO2 95%   BMI 24.43 kg/m    Body mass index is 24.43 kg/m .  Physical Exam   Right lower limb swelling present. Pitting edema present. Tenderness on lateral malleoli.  Distal pulses intact and symmetrical.

## 2021-06-22 NOTE — PATIENT INSTRUCTIONS
Please call RADIOLOGY to schedule an appointment for ultrasound:  [facility]:  [phone]   [facility]: [phone]

## 2021-06-23 ENCOUNTER — HOSPITAL ENCOUNTER (OUTPATIENT)
Dept: ULTRASOUND IMAGING | Facility: CLINIC | Age: 86
Discharge: HOME | End: 2021-06-23
Attending: FAMILY MEDICINE | Admitting: FAMILY MEDICINE
Payer: COMMERCIAL

## 2021-06-23 DIAGNOSIS — M25.471 RIGHT ANKLE SWELLING: ICD-10-CM

## 2021-06-23 DIAGNOSIS — M79.604 PAIN IN RIGHT LEG: ICD-10-CM

## 2021-06-23 PROCEDURE — 93971 EXTREMITY STUDY: CPT | Mod: RT

## 2021-06-23 PROCEDURE — T1013 SIGN LANG/ORAL INTERPRETER: HCPCS | Mod: U3

## 2021-06-23 ASSESSMENT — ASTHMA QUESTIONNAIRES: ACT_TOTALSCORE: 21

## 2021-07-15 ENCOUNTER — ANCILLARY PROCEDURE (OUTPATIENT)
Dept: CARDIOLOGY | Facility: CLINIC | Age: 86
End: 2021-07-15
Attending: INTERNAL MEDICINE
Payer: COMMERCIAL

## 2021-07-15 DIAGNOSIS — Z95.0 CARDIAC PACEMAKER IN SITU: ICD-10-CM

## 2021-07-15 PROCEDURE — 93294 REM INTERROG EVL PM/LDLS PM: CPT | Performed by: INTERNAL MEDICINE

## 2021-07-15 PROCEDURE — 93296 REM INTERROG EVL PM/IDS: CPT | Performed by: INTERNAL MEDICINE

## 2021-08-02 LAB
MDC_IDC_EPISODE_DTM: NORMAL
MDC_IDC_EPISODE_DURATION: 1 S
MDC_IDC_EPISODE_ID: 3
MDC_IDC_EPISODE_TYPE: NORMAL
MDC_IDC_LEAD_IMPLANT_DT: NORMAL
MDC_IDC_LEAD_LOCATION: NORMAL
MDC_IDC_LEAD_LOCATION_DETAIL_1: NORMAL
MDC_IDC_LEAD_MFG: NORMAL
MDC_IDC_LEAD_MODEL: NORMAL
MDC_IDC_LEAD_POLARITY_TYPE: NORMAL
MDC_IDC_LEAD_SERIAL: NORMAL
MDC_IDC_MSMT_BATTERY_DTM: NORMAL
MDC_IDC_MSMT_BATTERY_REMAINING_LONGEVITY: 173 MO
MDC_IDC_MSMT_BATTERY_RRT_TRIGGER: 2.62
MDC_IDC_MSMT_BATTERY_STATUS: NORMAL
MDC_IDC_MSMT_BATTERY_VOLTAGE: 3.13 V
MDC_IDC_MSMT_LEADCHNL_RV_IMPEDANCE_VALUE: 304 OHM
MDC_IDC_MSMT_LEADCHNL_RV_IMPEDANCE_VALUE: 513 OHM
MDC_IDC_MSMT_LEADCHNL_RV_PACING_THRESHOLD_AMPLITUDE: 1 V
MDC_IDC_MSMT_LEADCHNL_RV_PACING_THRESHOLD_PULSEWIDTH: 0.4 MS
MDC_IDC_MSMT_LEADCHNL_RV_SENSING_INTR_AMPL: 8.38 MV
MDC_IDC_MSMT_LEADCHNL_RV_SENSING_INTR_AMPL: 8.38 MV
MDC_IDC_PG_IMPLANT_DTM: NORMAL
MDC_IDC_PG_MFG: NORMAL
MDC_IDC_PG_MODEL: NORMAL
MDC_IDC_PG_SERIAL: NORMAL
MDC_IDC_PG_TYPE: NORMAL
MDC_IDC_SESS_CLINIC_NAME: NORMAL
MDC_IDC_SESS_DTM: NORMAL
MDC_IDC_SESS_TYPE: NORMAL
MDC_IDC_SET_BRADY_HYSTRATE: NORMAL
MDC_IDC_SET_BRADY_LOWRATE: 50 {BEATS}/MIN
MDC_IDC_SET_BRADY_MODE: NORMAL
MDC_IDC_SET_LEADCHNL_RV_PACING_AMPLITUDE: 2.25 V
MDC_IDC_SET_LEADCHNL_RV_PACING_ANODE_ELECTRODE_1: NORMAL
MDC_IDC_SET_LEADCHNL_RV_PACING_ANODE_LOCATION_1: NORMAL
MDC_IDC_SET_LEADCHNL_RV_PACING_CAPTURE_MODE: NORMAL
MDC_IDC_SET_LEADCHNL_RV_PACING_CATHODE_ELECTRODE_1: NORMAL
MDC_IDC_SET_LEADCHNL_RV_PACING_CATHODE_LOCATION_1: NORMAL
MDC_IDC_SET_LEADCHNL_RV_PACING_POLARITY: NORMAL
MDC_IDC_SET_LEADCHNL_RV_PACING_PULSEWIDTH: 0.4 MS
MDC_IDC_SET_LEADCHNL_RV_SENSING_ANODE_ELECTRODE_1: NORMAL
MDC_IDC_SET_LEADCHNL_RV_SENSING_ANODE_LOCATION_1: NORMAL
MDC_IDC_SET_LEADCHNL_RV_SENSING_CATHODE_ELECTRODE_1: NORMAL
MDC_IDC_SET_LEADCHNL_RV_SENSING_CATHODE_LOCATION_1: NORMAL
MDC_IDC_SET_LEADCHNL_RV_SENSING_POLARITY: NORMAL
MDC_IDC_SET_LEADCHNL_RV_SENSING_SENSITIVITY: 0.9 MV
MDC_IDC_SET_ZONE_DETECTION_INTERVAL: 360 MS
MDC_IDC_SET_ZONE_TYPE: NORMAL
MDC_IDC_STAT_BRADY_DTM_END: NORMAL
MDC_IDC_STAT_BRADY_DTM_START: NORMAL
MDC_IDC_STAT_BRADY_RV_PERCENT_PACED: 35.01 %
MDC_IDC_STAT_EPISODE_RECENT_COUNT: 0
MDC_IDC_STAT_EPISODE_RECENT_COUNT: 1
MDC_IDC_STAT_EPISODE_RECENT_COUNT_DTM_END: NORMAL
MDC_IDC_STAT_EPISODE_RECENT_COUNT_DTM_END: NORMAL
MDC_IDC_STAT_EPISODE_RECENT_COUNT_DTM_START: NORMAL
MDC_IDC_STAT_EPISODE_RECENT_COUNT_DTM_START: NORMAL
MDC_IDC_STAT_EPISODE_TOTAL_COUNT: 0
MDC_IDC_STAT_EPISODE_TOTAL_COUNT: 3
MDC_IDC_STAT_EPISODE_TOTAL_COUNT_DTM_END: NORMAL
MDC_IDC_STAT_EPISODE_TOTAL_COUNT_DTM_END: NORMAL
MDC_IDC_STAT_EPISODE_TOTAL_COUNT_DTM_START: NORMAL
MDC_IDC_STAT_EPISODE_TOTAL_COUNT_DTM_START: NORMAL
MDC_IDC_STAT_EPISODE_TYPE: NORMAL

## 2021-08-23 DIAGNOSIS — I50.9 ACUTE ON CHRONIC CONGESTIVE HEART FAILURE, UNSPECIFIED HEART FAILURE TYPE (H): ICD-10-CM

## 2021-08-23 RX ORDER — HYDRALAZINE HYDROCHLORIDE 25 MG/1
12.5 TABLET, FILM COATED ORAL 3 TIMES DAILY
Qty: 135 TABLET | Refills: 3 | Status: SHIPPED | OUTPATIENT
Start: 2021-08-23 | End: 2022-07-26

## 2021-09-02 ENCOUNTER — ANCILLARY PROCEDURE (OUTPATIENT)
Dept: GENERAL RADIOLOGY | Facility: CLINIC | Age: 86
End: 2021-09-02
Attending: FAMILY MEDICINE
Payer: COMMERCIAL

## 2021-09-02 ENCOUNTER — OFFICE VISIT (OUTPATIENT)
Dept: FAMILY MEDICINE | Facility: CLINIC | Age: 86
End: 2021-09-02
Payer: COMMERCIAL

## 2021-09-02 VITALS
RESPIRATION RATE: 16 BRPM | BODY MASS INDEX: 23.18 KG/M2 | WEIGHT: 148 LBS | SYSTOLIC BLOOD PRESSURE: 144 MMHG | DIASTOLIC BLOOD PRESSURE: 60 MMHG | OXYGEN SATURATION: 94 % | HEART RATE: 52 BPM | TEMPERATURE: 97.6 F

## 2021-09-02 DIAGNOSIS — I10 HYPERTENSION GOAL BP (BLOOD PRESSURE) < 140/90: ICD-10-CM

## 2021-09-02 DIAGNOSIS — E78.5 HYPERLIPIDEMIA WITH TARGET LDL LESS THAN 100: ICD-10-CM

## 2021-09-02 DIAGNOSIS — J45.40 MODERATE PERSISTENT ASTHMA WITHOUT COMPLICATION: ICD-10-CM

## 2021-09-02 DIAGNOSIS — Z00.00 ENCOUNTER FOR MEDICARE ANNUAL WELLNESS EXAM: Primary | ICD-10-CM

## 2021-09-02 DIAGNOSIS — R05.9 COUGH: ICD-10-CM

## 2021-09-02 DIAGNOSIS — I50.22 CHRONIC SYSTOLIC HEART FAILURE (H): ICD-10-CM

## 2021-09-02 LAB — NT-PROBNP SERPL-MCNC: 3764 PG/ML (ref 0–450)

## 2021-09-02 PROCEDURE — 99397 PER PM REEVAL EST PAT 65+ YR: CPT | Performed by: FAMILY MEDICINE

## 2021-09-02 PROCEDURE — 82043 UR ALBUMIN QUANTITATIVE: CPT | Performed by: FAMILY MEDICINE

## 2021-09-02 PROCEDURE — 83880 ASSAY OF NATRIURETIC PEPTIDE: CPT | Performed by: FAMILY MEDICINE

## 2021-09-02 PROCEDURE — 99214 OFFICE O/P EST MOD 30 MIN: CPT | Mod: 25 | Performed by: FAMILY MEDICINE

## 2021-09-02 PROCEDURE — 71046 X-RAY EXAM CHEST 2 VIEWS: CPT | Performed by: RADIOLOGY

## 2021-09-02 PROCEDURE — 80061 LIPID PANEL: CPT | Performed by: FAMILY MEDICINE

## 2021-09-02 PROCEDURE — 36415 COLL VENOUS BLD VENIPUNCTURE: CPT | Performed by: FAMILY MEDICINE

## 2021-09-02 RX ORDER — ALBUTEROL SULFATE 90 UG/1
AEROSOL, METERED RESPIRATORY (INHALATION)
Qty: 18 G | Refills: 2 | Status: SHIPPED | OUTPATIENT
Start: 2021-09-02 | End: 2023-05-30

## 2021-09-02 ASSESSMENT — ENCOUNTER SYMPTOMS
DYSURIA: 0
CONSTIPATION: 0
NAUSEA: 0
MYALGIAS: 1
SORE THROAT: 0
FREQUENCY: 0
PARESTHESIAS: 0
HEARTBURN: 0
ARTHRALGIAS: 0
MYALGIAS: 0
DIARRHEA: 0
JOINT SWELLING: 0
HEMATURIA: 0
ABDOMINAL PAIN: 0
FEVER: 0
SHORTNESS OF BREATH: 0
COUGH: 1
WEAKNESS: 0
DIZZINESS: 0
CHILLS: 0
NERVOUS/ANXIOUS: 0
EYE PAIN: 0
PALPITATIONS: 0
HEMATOCHEZIA: 0
HEADACHES: 0

## 2021-09-02 ASSESSMENT — ACTIVITIES OF DAILY LIVING (ADL): CURRENT_FUNCTION: NO ASSISTANCE NEEDED

## 2021-09-02 NOTE — PATIENT INSTRUCTIONS
Patient Education   Personalized Prevention Plan  You are due for the preventive services outlined below.  Your care team is available to assist you in scheduling these services.  If you have already completed any of these items, please share that information with your care team to update in your medical record.  Health Maintenance Due   Topic Date Due     Heart Failure Action Plan  Never done     ANNUAL REVIEW OF HM ORDERS  Never done     Annual Wellness Visit  05/28/2010     Zoster (Shingles) Vaccine (3 of 3) 01/15/2020     Asthma Action Plan - yearly  12/30/2020     Cholesterol Lab  06/01/2021     Kidney Microalbumin Urine Test  06/01/2021     FALL RISK ASSESSMENT  08/05/2021     Flu Vaccine (1) 09/01/2021

## 2021-09-02 NOTE — PROGRESS NOTES
"SUBJECTIVE:   Shiraz Ingram is a 86 year old male who presents for Preventive Visit.  Seen with .     Patient has been advised of split billing requirements and indicates understanding: Yes   Are you in the first 12 months of your Medicare coverage?  No    Healthy Habits:     In general, how would you rate your overall health?  Good    Frequency of exercise:  6-7 days/week    Duration of exercise:  30-45 minutes    Do you usually eat at least 4 servings of fruit and vegetables a day, include whole grains    & fiber and avoid regularly eating high fat or \"junk\" foods?  Yes    Taking medications regularly:  Yes    Medication side effects:  None    Ability to successfully perform activities of daily living:  No assistance needed    Home Safety:  No safety concerns identified    Hearing Impairment:  No hearing concerns    In the past 6 months, have you been bothered by leaking of urine?  No    In general, how would you rate your overall mental or emotional health?  Excellent      PHQ-2 Total Score: 0    Additional concerns today:  Yes    Other concerns:  Patient made this appointment two weeks ago when cough initially started. Cough described as generally dry, worse at night with lying flat. Small amount of yellow phlemg. Denies fever, chills, SOB, chest pain, wheezing. Hx of asthma, did not try rescue inhaler for nighttime cough. States cough has significantly improved.    Also has history of diastolic heart failure, aortic stenosis s/p TAVR, CAD followed by cardiology. No recent weight gain.   Wt Readings from Last 4 Encounters:   09/02/21 67.1 kg (148 lb)   06/22/21 70.8 kg (156 lb)   06/08/21 68.8 kg (151 lb 9.6 oz)   03/22/21 66.2 kg (146 lb)       Do you feel safe in your environment? Yes    Have you ever done Advance Care Planning? (For example, a Health Directive, POLST, or a discussion with a medical provider or your loved ones about your wishes): Yes, advance care planning is on file.    Fall " risk  Fallen 2 or more times in the past year?: No  Any fall with injury in the past year?: No    Cognitive Screening   1) Repeat 3 items (Leader, Season, Table)    2) Clock draw: NORMAL  3) 3 item recall: Recalls 2 objects   Results: NORMAL clock, 1-2 items recalled: COGNITIVE IMPAIRMENT LESS LIKELY      Reviewed and updated as needed this visit by clinical staff   Allergies  Meds              Reviewed and updated as needed this visit by Provider                Social History     Tobacco Use     Smoking status: Never Smoker     Smokeless tobacco: Never Used     Tobacco comment: smoked for a week in 20's   Substance Use Topics     Alcohol use: Not Currently     Alcohol/week: 0.0 standard drinks     Alcohol Use 9/2/2021   Prescreen: >3 drinks/day or >7 drinks/week? No       Current providers sharing in care for this patient include:  Patient Care Team:  Dany Rodriguez MD as PCP - General (Family Practice)  Dany Rodriguez MD as Assigned PCP  Joon Morris MD as Assigned Cancer Care Provider  Amee Rocha PA-C as Assigned Heart and Vascular Provider    The following health maintenance items are reviewed in Epic and correct as of today:  Health Maintenance Due   Topic Date Due     HF ACTION PLAN  Never done     ZOSTER IMMUNIZATION (3 of 3) 01/15/2020     ASTHMA ACTION PLAN  12/30/2020     LIPID  06/01/2021     MICROALBUMIN  06/01/2021     FALL RISK ASSESSMENT  08/05/2021     INFLUENZA VACCINE (1) 09/01/2021       Review of Systems   Constitutional: Negative for chills and fever.   HENT: Positive for hearing loss. Negative for congestion, ear pain and sore throat.    Eyes: Negative for pain and visual disturbance.   Respiratory: Positive for cough. Negative for shortness of breath.    Cardiovascular: Negative for chest pain, palpitations and peripheral edema.   Gastrointestinal: Negative for abdominal pain, constipation, diarrhea, heartburn, hematochezia and nausea.   Genitourinary:  "Negative for discharge, dysuria, frequency, genital sores, hematuria, impotence and urgency.   Musculoskeletal: Negative for arthralgias, joint swelling and myalgias.   Skin: Negative for rash.   Neurological: Negative for dizziness, weakness, headaches and paresthesias.   Psychiatric/Behavioral: Negative for mood changes. The patient is not nervous/anxious.      OBJECTIVE:   BP (!) 144/60   Pulse 52   Temp 97.6  F (36.4  C)   Resp 16   Wt 67.1 kg (148 lb)   SpO2 94%   BMI 23.18 kg/m   Estimated body mass index is 23.18 kg/m  as calculated from the following:    Height as of 6/8/21: 1.702 m (5' 7\").    Weight as of this encounter: 67.1 kg (148 lb).  Physical Exam  GENERAL: healthy, alert and no distress  EYES: Eyes grossly normal to inspection, PERRL and conjunctivae and sclerae normal  HENT:nose and mouth without ulcers or lesions  NECK: no adenopathy, no asymmetry, masses, or scars and thyroid normal to palpation  RESP: lungs clear to auscultation - no rales, rhonchi or wheezes  CV: regular rate and rhythm, normal S1 S2, no S3 or S4, no murmur, click or rub, no peripheral edema and peripheral pulses strong  ABDOMEN: soft, nontender, no hepatosplenomegaly, no masses and bowel sounds normal  MS: no gross musculoskeletal defects noted, no edema  SKIN: no suspicious lesions or rashes  NEURO: Normal strength and tone, mentation intact and speech normal  PSYCH: mentation appears normal, affect normal/bright      ASSESSMENT / PLAN:   Shiraz was seen today for physical and follow up.    Diagnoses and all orders for this visit:    Encounter for Medicare annual wellness exam  -     REVIEW OF HEALTH MAINTENANCE PROTOCOL ORDERS  -MA visit scheduled for flu vaccination    Hypertension goal BP (blood pressure) < 140/90  -Stable  -     Albumin Random Urine Quantitative with Creat Ratio    Hyperlipidemia with target LDL less than 100  -     Lipid Profile    Moderate persistent asthma without complication  -Stable no recent " "flares  -     albuterol (PROAIR HFA/PROVENTIL HFA/VENTOLIN HFA) 108 (90 Base) MCG/ACT inhaler; INHALE 1 TO 2 PUFFS BY MOUTH EVERY 4 TO 6 HOURS AS NEEDED    Cough  -Discussed causes of cough including asthma exacerbation and HF exacerbation given patient's history. Cough is significantly improved, possibly allergies or mild viral infection.  -CXR without any obvious signs of pneumonia or fluid overload  -Will obtain BNP for further evaluation  -     XR Chest 2 Views  -     BNP-N terminal pro    Chronic systolic (congestive) heart failure (H)   -Euvolemic today, unlikely cough due to HF exacerbation. Will get BNP for monitoring  -     BNP-N terminal pro    COUNSELING:  Reviewed preventive health counseling, as reflected in patient instructions       Regular exercise       Healthy diet/nutrition    Estimated body mass index is 23.18 kg/m  as calculated from the following:    Height as of 6/8/21: 1.702 m (5' 7\").    Weight as of this encounter: 67.1 kg (148 lb).        He reports that he has never smoked. He has never used smokeless tobacco.      Appropriate preventive services were discussed with this patient, including applicable screening as appropriate for cardiovascular disease, diabetes, osteopenia/osteoporosis, and glaucoma.  As appropriate for age/gender, discussed screening for colorectal cancer, prostate cancer, breast cancer, and cervical cancer. Checklist reviewing preventive services available has been given to the patient.    Reviewed patients plan of care and provided an AVS. The Basic Care Plan (routine screening as documented in Health Maintenance) for Shiraz meets the Care Plan requirement. This Care Plan has been established and reviewed with the Patient.        Ty Wing DO  [facility]    Identified Health Risks:  "

## 2021-09-02 NOTE — LETTER
September 7, 2021      Shiraz PALOMARES [place]  [address]  [facility] [address]        Dear ,    We are writing to inform you of your test results.    You are still passing some protein into your urine from high blood pressure but stable overall.   Cholesterol levels are normal.  BNP is stable in terms of your heart failure.    Resulted Orders   Albumin Random Urine Quantitative with Creat Ratio   Result Value Ref Range    Creatinine Urine mg/dL 36 mg/dL    Albumin Urine mg/L 8 mg/L    Albumin Urine mg/g Cr 22.22 (H) 0.00 - 17.00 mg/g Cr   Lipid Profile   Result Value Ref Range    Cholesterol 110 <200 mg/dL      Comment:      Age 0-19 years  Desirable: <170 mg/dL  Borderline high:  170-199 mg/dl  High:            >199 mg/dl    Age 20 years and older  Desirable: <200 mg/dL    Triglycerides 51 <150 mg/dL      Comment:      0-9 years:  Normal:    Less than 75 mg/dL  Borderline high:  75-99 mg/dL  High:             Greater than or equal to 100 mg/dL    0-19 years:  Normal:    Less than 90 mg/dL  Borderline high:   mg/dL  High:             Greater than or equal to 130 mg/dL    20 years and older:  Normal:    Less than 150 mg/dL  Borderline high:  150-199 mg/dL  High:             200-499 mg/dL  Very high:   Greater than or equal to 500 mg/dL    Direct Measure HDL 43 >=40 mg/dL      Comment:      0-19 years:       Greater than or equal to 45 mg/dL   Low: Less than 40 mg/dL   Borderline low: 40-44 mg/dL     20 years and older:   Female: Greater than or equal to 50 mg/dL   Male:   Greater than or equal to 40 mg/dL         LDL Cholesterol Calculated 57 <=100 mg/dL      Comment:      Age 0-19 years:  Desirable: 0-110 mg/dL   Borderline high: 110-129 mg/dL   High: >= 130 mg/dL    Age 20 years and older:  Desirable: <100mg/dL  Above desirable: 100-129 mg/dL   Borderline high: 130-159 mg/dL   High: 160-189 mg/dL   Very high: >= 190 mg/dL    Non HDL Cholesterol 67 <130 mg/dL      Comment:      0-19 years:  Desirable:           Less than 120 mg/dL  Borderline high:   120-144 mg/dL  High:                   Greater than or equal to 145 mg/dL    20 years and older:  Desirable:          130 mg/dL  Above Desirable: 130-159 mg/dL  Borderline high:   160-189 mg/dL  High:               190-219 mg/dL  Very high:     Greater than or equal to 220 mg/dL    Patient Fasting > 8hrs? Yes    BNP-N terminal pro   Result Value Ref Range    N Terminal Pro BNP Outpatient 3,764 (H) 0 - 450 pg/mL      Comment:      Reference range shown and results flagged as abnormal are for the outpatient, non acute settings. Establishing a baseline value for each individual patient is useful for follow-up.    Suggested inpatient cut points for confirming diagnosis of CHF in an acute setting are:  >450 pg/mL (age 18 to less than 50)  >900 pg/mL (age 50 to less than 75)  >1800 pg/mL (75 yrs and older)    An inpatient or emergency department NT-proPBNP <300 pg/mL effectively rules out acute CHF, with 99% negative predictive value.           If you have any questions or concerns, please call the clinic at the number listed above.       Sincerely,      Ty Wing, DO

## 2021-09-03 LAB
CHOLEST SERPL-MCNC: 110 MG/DL
FASTING STATUS PATIENT QL REPORTED: YES
HDLC SERPL-MCNC: 43 MG/DL
LDLC SERPL CALC-MCNC: 57 MG/DL
NONHDLC SERPL-MCNC: 67 MG/DL
TRIGL SERPL-MCNC: 51 MG/DL

## 2021-09-03 PROCEDURE — T1013 SIGN LANG/ORAL INTERPRETER: HCPCS | Mod: U3

## 2021-09-04 LAB
CREAT UR-MCNC: 36 MG/DL
MICROALBUMIN UR-MCNC: 8 MG/L
MICROALBUMIN/CREAT UR: 22.22 MG/G CR (ref 0–17)

## 2021-09-13 DIAGNOSIS — K22.11 ULCER OF ESOPHAGUS WITH BLEEDING: ICD-10-CM

## 2021-09-14 DIAGNOSIS — I50.31 ACUTE DIASTOLIC HEART FAILURE (H): ICD-10-CM

## 2021-09-14 DIAGNOSIS — E78.5 HYPERLIPIDEMIA WITH TARGET LDL LESS THAN 100: ICD-10-CM

## 2021-09-14 DIAGNOSIS — Z95.2 S/P TAVR (TRANSCATHETER AORTIC VALVE REPLACEMENT): ICD-10-CM

## 2021-09-15 DIAGNOSIS — J45.40 MODERATE PERSISTENT ASTHMA WITHOUT COMPLICATION: ICD-10-CM

## 2021-09-15 RX ORDER — PANTOPRAZOLE SODIUM 40 MG/1
TABLET, DELAYED RELEASE ORAL
Qty: 180 TABLET | Refills: 0 | Status: SHIPPED | OUTPATIENT
Start: 2021-09-15 | End: 2021-12-17

## 2021-09-15 NOTE — TELEPHONE ENCOUNTER
Routing refill request to provider for review/approval because:  osteoporosis is on the problem list.  JOSE Fregoso

## 2021-09-16 ENCOUNTER — ALLIED HEALTH/NURSE VISIT (OUTPATIENT)
Dept: FAMILY MEDICINE | Facility: CLINIC | Age: 86
End: 2021-09-16
Payer: COMMERCIAL

## 2021-09-16 DIAGNOSIS — Z23 NEED FOR PROPHYLACTIC VACCINATION AND INOCULATION AGAINST INFLUENZA: Primary | ICD-10-CM

## 2021-09-16 PROCEDURE — T1013 SIGN LANG/ORAL INTERPRETER: HCPCS | Mod: U3

## 2021-09-16 PROCEDURE — 99207 PR NO CHARGE NURSE ONLY: CPT

## 2021-09-16 PROCEDURE — 90662 IIV NO PRSV INCREASED AG IM: CPT

## 2021-09-16 PROCEDURE — G0008 ADMIN INFLUENZA VIRUS VAC: HCPCS

## 2021-09-16 NOTE — TELEPHONE ENCOUNTER
Routing refill request to provider for review/approval because:  --Labs out of range:  Scr and blood pressure.          --Last visit:  9/2/2021 [place] for CPE.    --Future Visit: flu shot.  Next 5 appointments (look out 90 days)    Sep 16, 2021 10:00 AM  Nurse Only with SHERI Escobedo MA/LPN  [facility] ([facility] [address] [place] [address]  [phone]          Creatinine   Date Value Ref Range Status   06/07/2021 1.72 (H) 0.66 - 1.25 mg/dL Final   03/22/2021 1.65 (H) 0.66 - 1.25 mg/dL Final   03/18/2021 1.81 (H) 0.66 - 1.25 mg/dL Final   03/17/2021 1.99 (H) 0.66 - 1.25 mg/dL Final   03/16/2021 2.12 (H) 0.66 - 1.25 mg/dL Final     BP Readings from Last 6 Encounters:   09/02/21 (!) 144/60   06/22/21 128/70   06/08/21 127/71   03/22/21 136/56   03/18/21 138/61   03/01/21 (!) 143/75

## 2021-09-17 RX ORDER — MONTELUKAST SODIUM 10 MG/1
TABLET ORAL
Qty: 90 TABLET | Refills: 1 | Status: SHIPPED | OUTPATIENT
Start: 2021-09-17 | End: 2022-03-14

## 2021-09-17 NOTE — TELEPHONE ENCOUNTER
Leukotriene Inhibitors Protocol [id number]/15/2021 12:17 AM   Patient is age 12 or older Protocol Details    Asthma control assessment score within normal limits in last 6 months     Medication is active on med list     Recent (6 mo) or future (30 days) visit within the authorizing provider's specialty

## 2021-09-19 RX ORDER — TORSEMIDE 5 MG/1
TABLET ORAL
Qty: 90 TABLET | Refills: 0 | Status: SHIPPED | OUTPATIENT
Start: 2021-09-19 | End: 2021-12-08

## 2021-10-21 ENCOUNTER — ANCILLARY PROCEDURE (OUTPATIENT)
Dept: CARDIOLOGY | Facility: CLINIC | Age: 86
End: 2021-10-21
Attending: INTERNAL MEDICINE
Payer: COMMERCIAL

## 2021-10-21 ENCOUNTER — PATIENT OUTREACH (OUTPATIENT)
Dept: CARE COORDINATION | Facility: CLINIC | Age: 86
End: 2021-10-21

## 2021-10-21 DIAGNOSIS — Z95.0 CARDIAC PACEMAKER IN SITU: ICD-10-CM

## 2021-10-21 DIAGNOSIS — I49.5 SSS (SICK SINUS SYNDROME) (H): Primary | ICD-10-CM

## 2021-10-21 PROCEDURE — 93279 PRGRMG DEV EVAL PM/LDLS PM: CPT | Performed by: INTERNAL MEDICINE

## 2021-10-21 NOTE — PROGRESS NOTES
Clinical Product Navigator RN reviewed chart; patient on payer product coverage.  Review results: Not met any any referral criteria at this time.  Will monitor for future needs    Evonne Anderson RN/Clinical Product Navigator

## 2021-10-23 LAB
MDC_IDC_LEAD_IMPLANT_DT: NORMAL
MDC_IDC_LEAD_LOCATION: NORMAL
MDC_IDC_LEAD_LOCATION_DETAIL_1: NORMAL
MDC_IDC_LEAD_MFG: NORMAL
MDC_IDC_LEAD_MODEL: NORMAL
MDC_IDC_LEAD_POLARITY_TYPE: NORMAL
MDC_IDC_LEAD_SERIAL: NORMAL
MDC_IDC_MSMT_BATTERY_DTM: NORMAL
MDC_IDC_MSMT_BATTERY_REMAINING_LONGEVITY: 170 MO
MDC_IDC_MSMT_BATTERY_RRT_TRIGGER: 2.62
MDC_IDC_MSMT_BATTERY_STATUS: NORMAL
MDC_IDC_MSMT_BATTERY_VOLTAGE: 3.09 V
MDC_IDC_MSMT_LEADCHNL_RV_IMPEDANCE_VALUE: 342 OHM
MDC_IDC_MSMT_LEADCHNL_RV_IMPEDANCE_VALUE: 589 OHM
MDC_IDC_MSMT_LEADCHNL_RV_PACING_THRESHOLD_AMPLITUDE: 1.12 V
MDC_IDC_MSMT_LEADCHNL_RV_PACING_THRESHOLD_PULSEWIDTH: 0.4 MS
MDC_IDC_MSMT_LEADCHNL_RV_SENSING_INTR_AMPL: 7.88 MV
MDC_IDC_MSMT_LEADCHNL_RV_SENSING_INTR_AMPL: 9.25 MV
MDC_IDC_PG_IMPLANT_DTM: NORMAL
MDC_IDC_PG_MFG: NORMAL
MDC_IDC_PG_MODEL: NORMAL
MDC_IDC_PG_SERIAL: NORMAL
MDC_IDC_PG_TYPE: NORMAL
MDC_IDC_SESS_CLINIC_NAME: NORMAL
MDC_IDC_SESS_DTM: NORMAL
MDC_IDC_SESS_TYPE: NORMAL
MDC_IDC_SET_BRADY_HYSTRATE: NORMAL
MDC_IDC_SET_BRADY_LOWRATE: 50 {BEATS}/MIN
MDC_IDC_SET_BRADY_MODE: NORMAL
MDC_IDC_SET_LEADCHNL_RV_PACING_AMPLITUDE: 2.25 V
MDC_IDC_SET_LEADCHNL_RV_PACING_ANODE_ELECTRODE_1: NORMAL
MDC_IDC_SET_LEADCHNL_RV_PACING_ANODE_LOCATION_1: NORMAL
MDC_IDC_SET_LEADCHNL_RV_PACING_CAPTURE_MODE: NORMAL
MDC_IDC_SET_LEADCHNL_RV_PACING_CATHODE_ELECTRODE_1: NORMAL
MDC_IDC_SET_LEADCHNL_RV_PACING_CATHODE_LOCATION_1: NORMAL
MDC_IDC_SET_LEADCHNL_RV_PACING_POLARITY: NORMAL
MDC_IDC_SET_LEADCHNL_RV_PACING_PULSEWIDTH: 0.4 MS
MDC_IDC_SET_LEADCHNL_RV_SENSING_ANODE_ELECTRODE_1: NORMAL
MDC_IDC_SET_LEADCHNL_RV_SENSING_ANODE_LOCATION_1: NORMAL
MDC_IDC_SET_LEADCHNL_RV_SENSING_CATHODE_ELECTRODE_1: NORMAL
MDC_IDC_SET_LEADCHNL_RV_SENSING_CATHODE_LOCATION_1: NORMAL
MDC_IDC_SET_LEADCHNL_RV_SENSING_POLARITY: NORMAL
MDC_IDC_SET_LEADCHNL_RV_SENSING_SENSITIVITY: 0.9 MV
MDC_IDC_SET_ZONE_DETECTION_INTERVAL: 360 MS
MDC_IDC_SET_ZONE_TYPE: NORMAL
MDC_IDC_STAT_BRADY_DTM_END: NORMAL
MDC_IDC_STAT_BRADY_DTM_START: NORMAL
MDC_IDC_STAT_BRADY_RV_PERCENT_PACED: 28.71 %
MDC_IDC_STAT_EPISODE_RECENT_COUNT: 0
MDC_IDC_STAT_EPISODE_RECENT_COUNT: 3
MDC_IDC_STAT_EPISODE_RECENT_COUNT_DTM_END: NORMAL
MDC_IDC_STAT_EPISODE_RECENT_COUNT_DTM_END: NORMAL
MDC_IDC_STAT_EPISODE_RECENT_COUNT_DTM_START: NORMAL
MDC_IDC_STAT_EPISODE_RECENT_COUNT_DTM_START: NORMAL
MDC_IDC_STAT_EPISODE_TOTAL_COUNT: 0
MDC_IDC_STAT_EPISODE_TOTAL_COUNT: 3
MDC_IDC_STAT_EPISODE_TOTAL_COUNT_DTM_END: NORMAL
MDC_IDC_STAT_EPISODE_TOTAL_COUNT_DTM_END: NORMAL
MDC_IDC_STAT_EPISODE_TOTAL_COUNT_DTM_START: NORMAL
MDC_IDC_STAT_EPISODE_TOTAL_COUNT_DTM_START: NORMAL
MDC_IDC_STAT_EPISODE_TYPE: NORMAL

## 2021-12-08 ENCOUNTER — TELEPHONE (OUTPATIENT)
Dept: CARDIOLOGY | Facility: CLINIC | Age: 86
End: 2021-12-08

## 2021-12-08 ENCOUNTER — LAB (OUTPATIENT)
Dept: LAB | Facility: CLINIC | Age: 86
End: 2021-12-08
Payer: COMMERCIAL

## 2021-12-08 ENCOUNTER — OFFICE VISIT (OUTPATIENT)
Dept: CARDIOLOGY | Facility: CLINIC | Age: 86
End: 2021-12-08
Attending: INTERNAL MEDICINE
Payer: COMMERCIAL

## 2021-12-08 VITALS
HEIGHT: 67 IN | HEART RATE: 52 BPM | WEIGHT: 149 LBS | DIASTOLIC BLOOD PRESSURE: 66 MMHG | BODY MASS INDEX: 23.39 KG/M2 | OXYGEN SATURATION: 98 % | SYSTOLIC BLOOD PRESSURE: 136 MMHG

## 2021-12-08 DIAGNOSIS — I50.31 ACUTE DIASTOLIC HEART FAILURE (H): ICD-10-CM

## 2021-12-08 DIAGNOSIS — E78.5 HYPERLIPIDEMIA WITH TARGET LDL LESS THAN 100: ICD-10-CM

## 2021-12-08 DIAGNOSIS — Z95.2 S/P TAVR (TRANSCATHETER AORTIC VALVE REPLACEMENT): ICD-10-CM

## 2021-12-08 DIAGNOSIS — I50.42 CHRONIC COMBINED SYSTOLIC AND DIASTOLIC HEART FAILURE (H): Primary | ICD-10-CM

## 2021-12-08 DIAGNOSIS — E78.5 HYPERLIPIDEMIA LDL GOAL <70: ICD-10-CM

## 2021-12-08 LAB
ANION GAP SERPL CALCULATED.3IONS-SCNC: 6 MMOL/L (ref 3–14)
BUN SERPL-MCNC: 64 MG/DL (ref 7–30)
CALCIUM SERPL-MCNC: 9.5 MG/DL (ref 8.5–10.1)
CHLORIDE BLD-SCNC: 103 MMOL/L (ref 94–109)
CO2 SERPL-SCNC: 30 MMOL/L (ref 20–32)
CREAT SERPL-MCNC: 1.81 MG/DL (ref 0.66–1.25)
GFR SERPL CREATININE-BSD FRML MDRD: 33 ML/MIN/1.73M2
GLUCOSE BLD-MCNC: 107 MG/DL (ref 70–99)
NT-PROBNP SERPL-MCNC: 3659 PG/ML (ref 0–450)
POTASSIUM BLD-SCNC: 4.8 MMOL/L (ref 3.4–5.3)
SODIUM SERPL-SCNC: 139 MMOL/L (ref 133–144)

## 2021-12-08 PROCEDURE — 83880 ASSAY OF NATRIURETIC PEPTIDE: CPT

## 2021-12-08 PROCEDURE — 80048 BASIC METABOLIC PNL TOTAL CA: CPT

## 2021-12-08 PROCEDURE — 99215 OFFICE O/P EST HI 40 MIN: CPT | Performed by: PHYSICIAN ASSISTANT

## 2021-12-08 PROCEDURE — 36415 COLL VENOUS BLD VENIPUNCTURE: CPT

## 2021-12-08 RX ORDER — TORSEMIDE 5 MG/1
10 TABLET ORAL DAILY
Qty: 180 TABLET | Refills: 3 | Status: SHIPPED | OUTPATIENT
Start: 2021-12-08 | End: 2022-05-10

## 2021-12-08 RX ORDER — TORSEMIDE 5 MG/1
TABLET ORAL
Qty: 90 TABLET | Refills: 0 | Status: CANCELLED | OUTPATIENT
Start: 2021-12-08

## 2021-12-08 RX ORDER — TORSEMIDE 5 MG/1
TABLET ORAL
Qty: 90 TABLET | Refills: 0 | COMMUNITY
Start: 2021-12-08 | End: 2021-12-08

## 2021-12-08 ASSESSMENT — MIFFLIN-ST. JEOR: SCORE: 1309.49

## 2021-12-08 NOTE — PROGRESS NOTES
Cardiology Clinic Progress Note    Shiraz Ingram MRN# [id number]   YOB: 1934 Age: 87 year old   Primary cardiologist: Dr. Field         Assessment and Plan:     In summary, Shiraz Ingram presents today for a [facility] clinic follow up visit.     1. 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 Currently appears well-compensated at a weight of 150 lbs,  2.  CKD-III/IV; with a creatinine near baseline at 1.7 but a BUN in the 50's on last check in June.  3.  Hypertension, controlled.  4.  Status post TAVR in July 2020, with stable function and mild amanda-valvular AI on 6/2021 TTE.  5.  Chronic atrial fibrillation with history of MAYA thrombus, on anticoagulation with Pradaxa.  6. CAD, s/p CABG. Denies angina.   7. Hyperlipidemia, well-controlled.     Plan:  - BMP and proBNP today. If stable, no changes.     Follow-up:  - With [facility] clinic in 3 months + BMP prior.   - With Dr. Field in 6 months.     It was a pleasure seeing Shiraz, as always!        History of Presenting Illness:      Shiraz Ingram is a pleasant 87 year old patient who presents today with an  for a follow up visit in the [facility] clinic.    He has a pertinent medical history of the following -  # Chronic HFpEF in the setting of VHD  # PHTN  # Severe aortic stenosis s/p TAVR 7/2020, complicated by CHB requiring PPM implant  # History of stable CAD s/p CABG in 2002 [LIMA -LAD, VG-OM and the RCA], with known moderate touchdown lesion of the right PDA branch of the [id number] Y-graft; patent LIMA to LAD, vein graft to the OM, and right posterior lateral Y graft branch as of last angiogram in 4/2020.   # CAF, with hx of MAYA thrombus, anticoagulated.  # Chronic iron deficient anemia, followed by Dr. Morris.  # Hx of upper GI bleed in May 2020, treated with cautery, on PPI.  # HTN  # Asthma  # CKD-III, baseline creatinine runs around 1.3-1.5.  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  He is excellent with [facility].    Unfortunately, Shiraz had a rough 2020 with multiple admissions. In July 2020, he ununderwent TAVR with a 29 mm Medtronic valve with successful reduction valve gradients to 5. His procedure was complicated by CHB, requiring PPM implantation. His diuretics were stopped. Several days post-discharge, he returned to the hospital with acute HFpEF. TTE was stable. He was diuresed and placed back on oral furosemide. When he saw Daksha Smith in follow-up in August, he appeared stable. He was then admitted end of September with pneumonia and RD, and his furosemide was again stopped in the hospital. This resulted in another admission for acute HFpEF in October. He was again diuresed and ultimately discharged on torsemide 10 mg daily. His torsemide was reduced in November due to RD and he unfortunately was readmitted with heart failure requiring diuresis.  Discharged on torsemide 15 mg daily. A good weight for him is felt to be around 140 pounds or less.    I then referred him to meet Dr. Tierney for consideration of cardioMEMs. He ultimately met dennise in March 2021, but it was felt not necessary at that time, as he'd recently been stable.     Since then, he has had some issues with acute kidney injury with a creatinine > 2, which required us to stop his lisinopril completely and up titrate isosorbide.    Echocardiogram was last obtained in June of this year, and was stable from his prior, showing EF of 45-50%, mild RV dilation and moderate dysfunction, TAVR valve had normal gradients with trace to mild AI with 2 small jets of perivalvular regurgitation, and moderate pulmonary hypertension with an RVSP of 41.  He saw Dr. Field after that, at which time his weight was up at 151 lbs. Creatinine was 1.7 and BUN was 54. He was felt to be stable, no changes were made, and he was asked to return in 6 months.    He saw his PCP in September for an annual wellness visit,  "and complained of shortness of breath and cough.  A proBNP was drawn which was elevated at 3700, however chest x-ray showed no acute abnormalities and therefore his medications were kept the same I believe.    Today, Shiraz presents for close reassessment.  Fortunately, he tells me he feels great from a cardiovascular standpoint.  He continues to work at his business at age 87.  He denies shortness of breath, orthopnea, palpitations, near syncope, chest pain.   He has very mild peripheral edema, which is currently not bothersome to him at all. His weight is stable from his visit this summer around 150 pounds in clinic and at home.  His blood pressure is controlled. He is currently taking his torsemide at 10 mg qAM and 5 mg qPM which he states hasn't changed in the past year, although we had this listed in our chart as just 5 mg daily. I have corrected this.   His last device check on October 21 showed 29% V pace with an underlying rhythm of atrial fibrillation with ventricular rates in the 30s to 60s.  He reported adequate exercise tolerance with this.  There were no ventricular arrhythmias.  He has 14 years remaining on his battery status.  His most recent labs were obtained in September, showing an LDL cholesterol of 54, HDL 48, triglycerides 87, total cholesterol 119.  He has not had his renal function checked since June.         Review of Systems:     12-pt ROS is negative except for as noted in the HPI.          Physical Exam:     Vitals: /66   Pulse 52   Ht 1.702 m (5' 7\")   Wt 67.6 kg (149 lb)   SpO2 98%   BMI 23.34 kg/m    Wt Readings from Last 10 Encounters:   12/08/21 67.6 kg (149 lb)   09/02/21 67.1 kg (148 lb)   06/22/21 70.8 kg (156 lb)   06/08/21 68.8 kg (151 lb 9.6 oz)   03/22/21 66.2 kg (146 lb)   03/17/21 66.3 kg (146 lb 3.2 oz)   03/01/21 66.2 kg (146 lb)   02/12/21 67.1 kg (148 lb)   01/19/21 63.5 kg (140 lb)   01/04/21 66 kg (145 lb 6.4 oz)       Constitutional:  Patient is pleasant, " alert, cooperative, and in NAD.  HEENT:  NCAT. PERRLA. EOM's intact.   Neck:  CVP appears normal. No carotid bruits.   Pulmonary: Normal respiratory effort. CTAB.   Cardiac: RRR, normal S1/S2, no S3/S4, no murmur or rub.   Abdomen:  Non-tender abdomen, no hepatosplenomegaly appreciated.   Vascular: Pulses in the upper and lower extremities are 2+ and equal bilaterally.  Extremities: No edema, erythema, cyanosis or tenderness appreciated.  Skin:  No rashes or lesions appreciated.   Neurological:  No gross motor or sensory deficits.   Psych: Appropriate affect.        Data:     Labs reviewed:  Recent Labs   Lab Test 09/02/21  0851 03/15/21  1254 03/01/21  1209 01/04/21  0906 12/30/20  0813 09/30/20  1024 06/01/20  0907 05/13/20  0537 06/12/19  0818   LDL 57  --   --   --   --   --  54  --  74   HDL 43  --   --   --   --   --  48  --  46   NHDL 67  --   --   --   --   --  71  --  90   CHOL 110  --   --   --   --   --  119  --  136   TRIG 51  --   --   --   --   --  87  --  82   TSH  --  1.68  --   --   --   --   --   --   --    NTBNP 3,764*  --   --  3,865* 4,451*   < >  --   --   --    IRON  --   --  82  --   --    < >  --    < > 117   FEB  --   --  286  --   --    < >  --    < > 312   IRONSAT  --   --  29  --   --    < >  --    < > 38   RJ  --   --  495*  --   --    < >  --    < > 602*    < > = values in this interval not displayed.       Lab Results   Component Value Date    WBC 3.2 (L) 06/07/2021    RBC 4.89 06/07/2021    HGB 10.3 (L) 06/07/2021    HCT 33.5 (L) 06/07/2021    MCV 69 (L) 06/07/2021    MCH 21.1 (L) 06/07/2021    MCHC 30.7 (L) 06/07/2021    RDW 16.9 (H) 06/07/2021     (L) 06/07/2021       Lab Results   Component Value Date     06/07/2021    POTASSIUM 4.3 06/07/2021    CHLORIDE 104 06/07/2021    CO2 27 06/07/2021    ANIONGAP 5 06/07/2021    GLC 97 06/07/2021    BUN 54 (H) 06/07/2021    CR 1.72 (H) 06/07/2021    GFRESTIMATED 35 (L) 06/07/2021    GFRESTBLACK 41 (L) 06/07/2021    AYALA 8.9  06/07/2021      Lab Results   Component Value Date    AST 25 03/15/2021    ALT 38 03/15/2021       No results found for: A1C    Lab Results   Component Value Date    INR 1.51 (H) 03/15/2021    INR 1.21 (H) 07/13/2020           Problem List:     Patient Active Problem List   Diagnosis     Benign neoplasm of colon     Localized osteoarthrosis, lower leg     Allergic rhinitis     Moderate persistent asthma     Osteoporosis     Hypertension goal BP (blood pressure) < 140/90     Hyperlipidemia with target LDL less than 100     CKD (chronic kidney disease) stage 3, GFR 30-59 ml/min (H)     Advanced directives, counseling/discussion     Iron deficiency anemia     Vitamin B 12 deficiency     Health Care Home     Acute respiratory failure (H)     Acute on chronic congestive heart failure, unspecified heart failure type (H)     Severe aortic stenosis -- S/P TAVR 12/2020     Coronary artery disease involving native coronary artery of native heart without angina pectoris     CAD, S/P CABG in 2002     Hyperkalemia     GI bleed     Pneumonia     Persistent Afib -- on Pradaxa      Thrombus of left atrial appendage     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     Aortic stenosis, severe     Acute congestive heart failure, unspecified heart failure type (H)     Acute respiratory failure with hypoxia (H)     Hx of Esophageal ulcer -- Cauterized 5/24/20     Anemia of chronic disease (baseline Hgb 9.0-10.5)     Chronic combined systolic and diastolic heart failure (H)           Medications:     Current Outpatient Medications   Medication Sig Dispense Refill     albuterol (PROAIR HFA/PROVENTIL HFA/VENTOLIN HFA) 108 (90 Base) MCG/ACT inhaler INHALE 1 TO 2 PUFFS BY MOUTH EVERY 4 TO 6 HOURS AS NEEDED 18 g 2     aspirin (ASA) 81 MG EC tablet Take 81 mg by mouth daily       budesonide (PULMICORT) 0.5 MG/2ML nebulizer solution Take 2 mLs (0.5 mg) by nebulization 2 times daily 120 mL 0     calcium carbonate 600 mg-vitamin D 400 units (CALTRATE)  600-400 MG-UNIT per tablet Take 1 tablet by mouth daily       dabigatran ANTICOAGULANT (PRADAXA) 150 MG capsule Take 1 capsule (150 mg) by mouth 2 times daily 180 capsule 3     ferrous sulfate (IRON) 325 (65 Fe) MG tablet TAKE 1 TABLET(325 MG) BY MOUTH TWICE DAILY 180 tablet 0     hydrALAZINE (APRESOLINE) 25 MG tablet Take 0.5 tablets (12.5 mg) by mouth 3 times daily 135 tablet 3     ipratropium - albuterol 0.5 mg/2.5 mg/3 mL (DUONEB) 0.5-2.5 (3) MG/3ML nebulization Inhale 1 vial (3 mLs) into the lungs 4 times daily 360 mL 11     isosorbide mononitrate (IMDUR) 30 MG 24 hr tablet Take by mouth daily  90 tablet 3     magnesium chloride (MAG64) 535 (64 Mg) MG TBEC CR tablet Take 1 tablet (535 mg) by mouth 2 times daily 180 tablet 3     meclizine (ANTIVERT) 12.5 MG tablet Take 1 tablet (12.5 mg) by mouth 3 times daily as needed for dizziness 90 tablet 1     melatonin 3 MG tablet Take 1 tablet (3 mg) by mouth nightly as needed for sleep 30 tablet 0     metoprolol tartrate (LOPRESSOR) 25 MG tablet Take 1 tablet (25 mg) by mouth 2 times daily 180 tablet 2     montelukast (SINGULAIR) 10 MG tablet TAKE 1 TABLET BY MOUTH EVERY DAY 90 tablet 1     multivitamin, therapeutic (THERA-VIT) TABS tablet Take 1 tablet by mouth every evening        nitroGLYcerin (NITROSTAT) 0.4 MG sublingual tablet For chest pain place 1 tablet under the tongue every 5 minutes for 3 doses. If symptoms persist 5 minutes after 1st dose call 911. 12 tablet 0     pantoprazole (PROTONIX) 40 MG EC tablet TAKE 1 TABLET BEFORE MEALS TWICE A  tablet 0     simvastatin (ZOCOR) 40 MG tablet Take 1 tablet (40 mg) by mouth At Bedtime 90 tablet 3     torsemide (DEMADEX) 5 MG tablet TAKE 1 TABLET BY MOUTH EVERY DAY AS NEEDED 90 tablet 0           Past Medical History:     Past Medical History:   Diagnosis Date     Allergic rhinitis, cause unspecified      Anemia, unspecified      Aortic stenosis S/P TAVR      Benign neoplasm of colon 1999    Ademonatous polyp      CHF 2nd to TAVR -- S/P Pacemaker 12/2020     CKD (chronic kidney disease) stage 3, GFR 30-59 ml/min (H) 05/12/2011     Community acquired pneumonia 9/29/2020     Coronary atherosclerosis of unspecified type of vessel, native or graft     s/p CABG 2002     Esophageal ulcer w UGI bleed 05/24/2020    Had EGD adn caurtery 5/24/20, colonoscopy with diverticulosis then     Hyperlipidemia LDL goal < 100      Hypertension goal BP (blood pressure) < 140/90      Localized osteoarthrosis not specified whether primary or secondary, lower leg     left knee     Mild persistent asthma without complication 1/11/2016     Moderate persistent asthma      Persistent atrial fibrillation (H)      Unspecified hearing loss      Past Surgical History:   Procedure Laterality Date     CARDIAC SURGERY       COLONOSCOPY N/A 5/26/2020    Procedure: COLONOSCOPY;  Surgeon: Ignacio Fournier MD;  Location:  GI     CV ANGIOGRAM CORONARY GRAFT N/A 4/24/2020    Procedure: Angiogram Coronary Graft;  Surgeon: Addison Willard MD;  Location: [facility] CARDIAC CATH LAB     CV CORONARY ANGIOGRAM N/A 4/24/2020    Procedure: Coronary Angiogram;  Surgeon: Addison Willard MD;  Location: [facility] CARDIAC CATH LAB     CV RIGHT HEART CATH MEASUREMENTS RECORDED N/A 4/24/2020    Procedure: Right Heart Cath;  Surgeon: Addison Willard MD;  Location: [facility] CARDIAC CATH LAB     CV TRANSCATHETER AORTIC VALVE REPLACEMENT N/A 7/14/2020    Procedure: Transcatheter Aortic Valve Replacement;  Surgeon: Soraida Monet MD;  Location:  HEART CARDIAC CATH LAB     EP PACEMAKER N/A 7/15/2020    Procedure: EP Pacemaker;  Surgeon: Tommie Sauer MD;  Location: [facility] CARDIAC CATH LAB     HC ESOPHAGOSCOPY, DIAGNOSTIC  5/03    Dr. Dow, lower esophageal ring & mild gastritis     HERNIORRHAPHY INGUINAL Right 9/26/2016    Procedure: HERNIORRHAPHY INGUINAL;  Surgeon: Alexis Alexandra MD;  Location: [facility]     LAPAROSCOPIC  CHOLECYSTECTOMY      for biliary sludge     [facility] NONSPECIFIC PROCEDURE      Coronary artery bypass     [facility] COLONOSCOPY THRU STOMA W BIOPSY/CAUTERY TUMOR/POLYP/LESION      Dr. Dow, Q 5 years     [facility]/CAUTERY TUMOR/POLYP/LESION      Dr. Dow, one adenomatous polyp     Family History   Problem Relation Age of Onset     C.A.D. Father      Cancer Mother         breast cancer,  of pneumonia     Cerebrovascular Disease Son      CABG Son      Family History Negative Child      Family History Negative Sister      Heart Disease Brother      Social History     Socioeconomic History     Marital status:      Spouse name: Kailey     Number of children: 3     Years of education: Not on file     Highest education level: Not on file   Occupational History     Occupation: [employer]     Employer: RETIRED   Tobacco Use     Smoking status: Never Smoker     Smokeless tobacco: Never Used     Tobacco comment: smoked for a week in    Substance and Sexual Activity     Alcohol use: Not Currently     Alcohol/week: 0.0 standard drinks     Drug use: No     Sexual activity: Yes     Partners: Female   Other Topics Concern      Service No     Blood Transfusions No     Caffeine Concern Not Asked     Occupational Exposure Not Asked     Hobby Hazards Not Asked     Sleep Concern Not Asked     Stress Concern Not Asked     Weight Concern Not Asked     Special Diet Not Asked     Back Care Not Asked     Exercise Yes     Bike Helmet Not Asked     Seat Belt Yes     Self-Exams No     Parent/sibling w/ CABG, MI or angioplasty before 65F 55M? Yes   Social History Narrative    Balanced Diet - Yes    Osteoporosis Preventative measures-  Dairy servings per day: 2 to 3 servings daily    Regular Exercise -  Yes Describe walks 1.5 mile daily     Dental Exam up - YES - Date: 2 years ago    Eye Exam - YES - Date: 1 year ago    Self Testicular Exam -  No, handout given    Do you have any  concerns about STD's -  No    Abuse: Current or Past (Physical, Sexual or Emotional)- No    Do you feel safe in your environment - Yes    Guns stored in the home - Yes, locked    Sunscreen used - No    Seatbelts used - Yes    Lipids - YES - Date: 3-09    Glucose -  YES - Date: 3-09    Colon Cancer Screening - Colonoscopy 3-08(date completed)    Hemoccults - UNKNOWN    PSA - YES - Date: 11-07    Digital Rectal Exam - UNKNOWN    Immunizations reviewed and up to date - Yes, td 1-2005, had shingles vaccine    5-28-09  JANEY Patel [facility]                     Social Determinants of Health     Financial Resource Strain: Low Risk      Difficulty of Paying Living Expenses: Not hard at all   Food Insecurity: No Food Insecurity     Worried About Running Out of Food in the Last Year: Never true     Ran Out of Food in the Last Year: Never true   Transportation Needs: No Transportation Needs     Lack of Transportation (Medical): No     Lack of Transportation (Non-Medical): No   Physical Activity: Not on file   Stress: Not on file   Social Connections: Unknown     Frequency of Communication with Friends and Family: More than three times a week     Frequency of Social Gatherings with Friends and Family: Not on file     Attends Christianity Services: Not on file     Active Member of Clubs or Organizations: Not on file     Attends Club or Organization Meetings: Not on file     Marital Status:    Intimate Partner Violence: Not on file   Housing Stability: Not on file           Allergies:   Patient has no known allergies.      Amee Rocha PA-C  [facility]  Pager: [phone]    A total of 60 minutes was spent with this patient, on chart review and documentation.

## 2021-12-08 NOTE — LETTER
12/8/2021    Dany Rodriguez MD, MD  [address] [place] [id number]    RE: Shiraz Ingram       Dear Colleague,     I had the pleasure of seeing Shiraz Ingram in the [facility].    Cardiology Clinic Progress Note    Shiraz Ingram MRN# [id number]   YOB: 1934 Age: 87 year old   Primary cardiologist: Dr. Field         Assessment and Plan:     In summary, Shiraz Ingram presents today for a C[facility] clinic follow up visit.     1. 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 Currently appears well-compensated at a weight of 150 lbs,  2.  CKD-III/IV; with a creatinine near baseline at 1.7 but a BUN in the 50's on last check in June.  3.  Hypertension, controlled.  4.  Status post TAVR in July 2020, with stable function and mild amanda-valvular AI on 6/2021 TTE.  5.  Chronic atrial fibrillation with history of MAYA thrombus, on anticoagulation with Pradaxa.  6. CAD, s/p CABG. Denies angina.   7. Hyperlipidemia, well-controlled.     Plan:  - BMP and proBNP today. If stable, no changes.     Follow-up:  - With CORE clinic in 3 months + BMP prior.   - With Dr. Field in 6 months.     It was a pleasure seeing Shiraz, as always!        History of Presenting Illness:      Shiraz Ingram is a pleasant 87 year old patient who presents today with an  for a follow up visit in the [facility] clinic.    He has a pertinent medical history of the following -  # Chronic HFpEF in the setting of VHD  # PHTN  # Severe aortic stenosis s/p TAVR 7/2020, complicated by CHB requiring PPM implant  # History of stable CAD s/p CABG in 2002 [LIMA -LAD, VG-OM and the RCA], with known moderate touchdown lesion of the right PDA branch of the RCP-[id number]-[id number] Y-graft; patent LIMA to LAD, vein graft to the OM, and right posterior lateral Y graft branch as of last angiogram in 4/2020.   # CAF, with hx of MAYA thrombus, anticoagulated.  # Chronic iron deficient anemia, followed by Dr. Morris.  # Hx of upper GI  bleed in May 2020, treated with cautery, on PPI.  # HTN  # Asthma  # CKD-III, baseline creatinine runs around 1.3-1.5.  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  He is excellent with ASL.    Unfortunately, Shiraz had a rough 2020 with multiple admissions. In July 2020, he ununderwent TAVR with a 29 mm Medtronic valve with successful reduction valve gradients to 5. His procedure was complicated by CHB, requiring PPM implantation. His diuretics were stopped. Several days post-discharge, he returned to the hospital with acute HFpEF. TTE was stable. He was diuresed and placed back on oral furosemide. When he saw Daksha Smith in follow-up in August, he appeared stable. He was then admitted end of September with pneumonia and RD, and his furosemide was again stopped in the hospital. This resulted in another admission for acute HFpEF in October. He was again diuresed and ultimately discharged on torsemide 10 mg daily. His torsemide was reduced in November due to RD and he unfortunately was readmitted with heart failure requiring diuresis.  Discharged on torsemide 15 mg daily. A good weight for him is felt to be around 140 pounds or less.    I then referred him to meet Dr. Tierney for consideration of cardioMEMs. He ultimately met dennise in March 2021, but it was felt not necessary at that time, as he'd recently been stable.     Since then, he has had some issues with acute kidney injury with a creatinine > 2, which required us to stop his lisinopril completely and up titrate isosorbide.    Echocardiogram was last obtained in June of this year, and was stable from his prior, showing EF of 45-50%, mild RV dilation and moderate dysfunction, TAVR valve had normal gradients with trace to mild AI with 2 small jets of perivalvular regurgitation, and moderate pulmonary hypertension with an RVSP of 41.  He saw Dr. Field after that, at which time  "his weight was up at 151 lbs. Creatinine was 1.7 and BUN was 54. He was felt to be stable, no changes were made, and he was asked to return in 6 months.    He saw his PCP in September for an annual wellness visit, and complained of shortness of breath and cough.  A proBNP was drawn which was elevated at 3700, however chest x-ray showed no acute abnormalities and therefore his medications were kept the same I believe.    Today, Shiraz presents for close reassessment.  Fortunately, he tells me he feels great from a cardiovascular standpoint.  He continues to work at his business at age 87.  He denies shortness of breath, orthopnea, palpitations, near syncope, chest pain.   He has very mild peripheral edema, which is currently not bothersome to him at all. His weight is stable from his visit this summer around 150 pounds in clinic and at home.  His blood pressure is controlled. He is currently taking his torsemide at 10 mg qAM and 5 mg qPM which he states hasn't changed in the past year, although we had this listed in our chart as just 5 mg daily. I have corrected this.   His last device check on October 21 showed 29% V pace with an underlying rhythm of atrial fibrillation with ventricular rates in the 30s to 60s.  He reported adequate exercise tolerance with this.  There were no ventricular arrhythmias.  He has 14 years remaining on his battery status.  His most recent labs were obtained in September, showing an LDL cholesterol of 54, HDL 48, triglycerides 87, total cholesterol 119.  He has not had his renal function checked since June.         Review of Systems:     12-pt ROS is negative except for as noted in the HPI.          Physical Exam:     Vitals: /66   Pulse 52   Ht 1.702 m (5' 7\")   Wt 67.6 kg (149 lb)   SpO2 98%   BMI 23.34 kg/m    Wt Readings from Last 10 Encounters:   12/08/21 67.6 kg (149 lb)   09/02/21 67.1 kg (148 lb)   06/22/21 70.8 kg (156 lb)   06/08/21 68.8 kg (151 lb 9.6 oz)   03/22/21 " 66.2 kg (146 lb)   03/17/21 66.3 kg (146 lb 3.2 oz)   03/01/21 66.2 kg (146 lb)   02/12/21 67.1 kg (148 lb)   01/19/21 63.5 kg (140 lb)   01/04/21 66 kg (145 lb 6.4 oz)       Constitutional:  Patient is pleasant, alert, cooperative, and in NAD.  HEENT:  NCAT. PERRLA. EOM's intact.   Neck:  CVP appears normal. No carotid bruits.   Pulmonary: Normal respiratory effort. CTAB.   Cardiac: RRR, normal S1/S2, no S3/S4, no murmur or rub.   Abdomen:  Non-tender abdomen, no hepatosplenomegaly appreciated.   Vascular: Pulses in the upper and lower extremities are 2+ and equal bilaterally.  Extremities: No edema, erythema, cyanosis or tenderness appreciated.  Skin:  No rashes or lesions appreciated.   Neurological:  No gross motor or sensory deficits.   Psych: Appropriate affect.        Data:     Labs reviewed:  Recent Labs   Lab Test 09/02/21  0851 03/15/21  1254 03/01/21  1209 01/04/21  0906 12/30/20  0813 09/30/20  1024 06/01/20  0907 05/13/20  0537 06/12/19  0818   LDL 57  --   --   --   --   --  54  --  74   HDL 43  --   --   --   --   --  48  --  46   NHDL 67  --   --   --   --   --  71  --  90   CHOL 110  --   --   --   --   --  119  --  136   TRIG 51  --   --   --   --   --  87  --  82   TSH  --  1.68  --   --   --   --   --   --   --    NTBNP 3,764*  --   --  3,865* 4,451*   < >  --   --   --    IRON  --   --  82  --   --    < >  --    < > 117   FEB  --   --  286  --   --    < >  --    < > 312   IRONSAT  --   --  29  --   --    < >  --    < > 38   RJ  --   --  495*  --   --    < >  --    < > 602*    < > = values in this interval not displayed.       Lab Results   Component Value Date    WBC 3.2 (L) 06/07/2021    RBC 4.89 06/07/2021    HGB 10.3 (L) 06/07/2021    HCT 33.5 (L) 06/07/2021    MCV 69 (L) 06/07/2021    MCH 21.1 (L) 06/07/2021    MCHC 30.7 (L) 06/07/2021    RDW 16.9 (H) 06/07/2021     (L) 06/07/2021       Lab Results   Component Value Date     06/07/2021    POTASSIUM 4.3 06/07/2021    CHLORIDE  104 06/07/2021    CO2 27 06/07/2021    ANIONGAP 5 06/07/2021    GLC 97 06/07/2021    BUN 54 (H) 06/07/2021    CR 1.72 (H) 06/07/2021    GFRESTIMATED 35 (L) 06/07/2021    GFRESTBLACK 41 (L) 06/07/2021    AYALA 8.9 06/07/2021      Lab Results   Component Value Date    AST 25 03/15/2021    ALT 38 03/15/2021       No results found for: A1C    Lab Results   Component Value Date    INR 1.51 (H) 03/15/2021    INR 1.21 (H) 07/13/2020           Problem List:     Patient Active Problem List   Diagnosis     Benign neoplasm of colon     Localized osteoarthrosis, lower leg     Allergic rhinitis     Moderate persistent asthma     Osteoporosis     Hypertension goal BP (blood pressure) < 140/90     Hyperlipidemia with target LDL less than 100     CKD (chronic kidney disease) stage 3, GFR 30-59 ml/min (H)     Advanced directives, counseling/discussion     Iron deficiency anemia     Vitamin B 12 deficiency     Health Care Home     Acute respiratory failure (H)     Acute on chronic congestive heart failure, unspecified heart failure type (H)     Severe aortic stenosis -- S/P TAVR 12/2020     Coronary artery disease involving native coronary artery of native heart without angina pectoris     CAD, S/P CABG in 2002     Hyperkalemia     GI bleed     Pneumonia     Persistent Afib -- on Pradaxa      Thrombus of left atrial appendage     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     Aortic stenosis, severe     Acute congestive heart failure, unspecified heart failure type (H)     Acute respiratory failure with hypoxia (H)     Hx of Esophageal ulcer -- Cauterized 5/24/20     Anemia of chronic disease (baseline Hgb 9.0-10.5)     Chronic combined systolic and diastolic heart failure (H)           Medications:     Current Outpatient Medications   Medication Sig Dispense Refill     albuterol (PROAIR HFA/PROVENTIL HFA/VENTOLIN HFA) 108 (90 Base) MCG/ACT inhaler INHALE 1 TO 2 PUFFS BY MOUTH EVERY 4 TO 6 HOURS AS NEEDED 18 g 2     aspirin (ASA) 81 MG EC  tablet Take 81 mg by mouth daily       budesonide (PULMICORT) 0.5 MG/2ML nebulizer solution Take 2 mLs (0.5 mg) by nebulization 2 times daily 120 mL 0     calcium carbonate 600 mg-vitamin D 400 units (CALTRATE) 600-400 MG-UNIT per tablet Take 1 tablet by mouth daily       dabigatran ANTICOAGULANT (PRADAXA) 150 MG capsule Take 1 capsule (150 mg) by mouth 2 times daily 180 capsule 3     ferrous sulfate (IRON) 325 (65 Fe) MG tablet TAKE 1 TABLET(325 MG) BY MOUTH TWICE DAILY 180 tablet 0     hydrALAZINE (APRESOLINE) 25 MG tablet Take 0.5 tablets (12.5 mg) by mouth 3 times daily 135 tablet 3     ipratropium - albuterol 0.5 mg/2.5 mg/3 mL (DUONEB) 0.5-2.5 (3) MG/3ML nebulization Inhale 1 vial (3 mLs) into the lungs 4 times daily 360 mL 11     isosorbide mononitrate (IMDUR) 30 MG 24 hr tablet Take by mouth daily  90 tablet 3     magnesium chloride (MAG64) 535 (64 Mg) MG TBEC CR tablet Take 1 tablet (535 mg) by mouth 2 times daily 180 tablet 3     meclizine (ANTIVERT) 12.5 MG tablet Take 1 tablet (12.5 mg) by mouth 3 times daily as needed for dizziness 90 tablet 1     melatonin 3 MG tablet Take 1 tablet (3 mg) by mouth nightly as needed for sleep 30 tablet 0     metoprolol tartrate (LOPRESSOR) 25 MG tablet Take 1 tablet (25 mg) by mouth 2 times daily 180 tablet 2     montelukast (SINGULAIR) 10 MG tablet TAKE 1 TABLET BY MOUTH EVERY DAY 90 tablet 1     multivitamin, therapeutic (THERA-VIT) TABS tablet Take 1 tablet by mouth every evening        nitroGLYcerin (NITROSTAT) 0.4 MG sublingual tablet For chest pain place 1 tablet under the tongue every 5 minutes for 3 doses. If symptoms persist 5 minutes after 1st dose call 911. 12 tablet 0     pantoprazole (PROTONIX) 40 MG EC tablet TAKE 1 TABLET BEFORE MEALS TWICE A  tablet 0     simvastatin (ZOCOR) 40 MG tablet Take 1 tablet (40 mg) by mouth At Bedtime 90 tablet 3     torsemide (DEMADEX) 5 MG tablet TAKE 1 TABLET BY MOUTH EVERY DAY AS NEEDED 90 tablet 0           Past  Medical History:     Past Medical History:   Diagnosis Date     Allergic rhinitis, cause unspecified      Anemia, unspecified      Aortic stenosis S/P TAVR      Benign neoplasm of colon 1999    Ademonatous polyp     CHF 2nd to TAVR -- S/P Pacemaker 12/2020     CKD (chronic kidney disease) stage 3, GFR 30-59 ml/min (H) 05/12/2011     Community acquired pneumonia 9/29/2020     Coronary atherosclerosis of unspecified type of vessel, native or graft     s/p CABG 2002     Esophageal ulcer w UGI bleed 05/24/2020    Had EGD adn caurtery 5/24/20, colonoscopy with diverticulosis then     Hyperlipidemia LDL goal < 100      Hypertension goal BP (blood pressure) < 140/90      Localized osteoarthrosis not specified whether primary or secondary, lower leg     left knee     Mild persistent asthma without complication 1/11/2016     Moderate persistent asthma      Persistent atrial fibrillation (H)      Unspecified hearing loss      Past Surgical History:   Procedure Laterality Date     CARDIAC SURGERY       COLONOSCOPY N/A 5/26/2020    Procedure: COLONOSCOPY;  Surgeon: Ignacio Fournier MD;  Location:  GI     CV ANGIOGRAM CORONARY GRAFT N/A 4/24/2020    Procedure: Angiogram Coronary Graft;  Surgeon: Addison Willard MD;  Location: [facility] CARDIAC CATH LAB     CV CORONARY ANGIOGRAM N/A 4/24/2020    Procedure: Coronary Angiogram;  Surgeon: Addison Willard MD;  Location:  HEART CARDIAC CATH LAB     CV RIGHT HEART CATH MEASUREMENTS RECORDED N/A 4/24/2020    Procedure: Right Heart Cath;  Surgeon: Addison Willard MD;  Location:  HEART CARDIAC CATH LAB     CV TRANSCATHETER AORTIC VALVE REPLACEMENT N/A 7/14/2020    Procedure: Transcatheter Aortic Valve Replacement;  Surgeon: Soraida Monet MD;  Location:  HEART CARDIAC CATH LAB     EP PACEMAKER N/A 7/15/2020    Procedure: EP Pacemaker;  Surgeon: Tommie Sauer MD;  Location:  HEART CARDIAC CATH LAB     HC ESOPHAGOSCOPY, DIAGNOSTIC  5/03     Dr. Dow, lower esophageal ring & mild gastritis     HERNIORRHAPHY INGUINAL Right 2016    Procedure: HERNIORRHAPHY INGUINAL;  Surgeon: Alexis Alexandra MD;  Location: [facility]     LAPAROSCOPIC CHOLECYSTECTOMY      for biliary sludge     ZZC NONSPECIFIC PROCEDURE      Coronary artery bypass     ZZHC COLONOSCOPY THRU STOMA W BIOPSY/CAUTERY TUMOR/POLYP/LESION      Dr. Dow, Q 5 years     ZZHC COLONOSCOPY THRU STOMA W BIOPSY/CAUTERY TUMOR/POLYP/LESION      Dr. Dow, one adenomatous polyp     Family History   Problem Relation Age of Onset     C.A.D. Father      Cancer Mother         breast cancer,  of pneumonia     Cerebrovascular Disease Son      CABG Son      Family History Negative Child      Family History Negative Sister      Heart Disease Brother      Social History     Socioeconomic History     Marital status:      Spouse name: Kailey     Number of children: 3     Years of education: Not on file     Highest education level: Not on file   Occupational History     Occupation: "[employer]     Employer: RETIRED   Tobacco Use     Smoking status: Never Smoker     Smokeless tobacco: Never Used     Tobacco comment: smoked for a week in 's   Substance and Sexual Activity     Alcohol use: Not Currently     Alcohol/week: 0.0 standard drinks     Drug use: No     Sexual activity: Yes     Partners: Female   Other Topics Concern      Service No     Blood Transfusions No     Caffeine Concern Not Asked     Occupational Exposure Not Asked     Hobby Hazards Not Asked     Sleep Concern Not Asked     Stress Concern Not Asked     Weight Concern Not Asked     Special Diet Not Asked     Back Care Not Asked     Exercise Yes     Bike Helmet Not Asked     Seat Belt Yes     Self-Exams No     Parent/sibling w/ CABG, MI or angioplasty before 65F 55M? Yes   Social History Narrative    Balanced Diet - Yes    Osteoporosis Preventative measures-  Dairy servings per day: 2 to 3 servings daily     Regular Exercise -  Yes Describe walks 1.5 mile daily     Dental Exam up - YES - Date: 2 years ago    Eye Exam - YES - Date: 1 year ago    Self Testicular Exam -  No, handout given    Do you have any concerns about STD's -  No    Abuse: Current or Past (Physical, Sexual or Emotional)- No    Do you feel safe in your environment - Yes    Guns stored in the home - Yes, locked    Sunscreen used - No    Seatbelts used - Yes    Lipids - YES - Date: 3-09    Glucose -  YES - Date: 3-09    Colon Cancer Screening - Colonoscopy 3-08(date completed)    Hemoccults - UNKNOWN    PSA - YES - Date: 11-07    Digital Rectal Exam - UNKNOWN    Immunizations reviewed and up to date - Yes, td 1-2005, had shingles vaccine    5-28-09  JANEY Patel [facility]                     Social Determinants of Health     Financial Resource Strain: Low Risk      Difficulty of Paying Living Expenses: Not hard at all   Food Insecurity: No Food Insecurity     Worried About Running Out of Food in the Last Year: Never true     Ran Out of Food in the Last Year: Never true   Transportation Needs: No Transportation Needs     Lack of Transportation (Medical): No     Lack of Transportation (Non-Medical): No   Physical Activity: Not on file   Stress: Not on file   Social Connections: Unknown     Frequency of Communication with Friends and Family: More than three times a week     Frequency of Social Gatherings with Friends and Family: Not on file     Attends Advent Services: Not on file     Active Member of Clubs or Organizations: Not on file     Attends Club or Organization Meetings: Not on file     Marital Status:    Intimate Partner Violence: Not on file   Housing Stability: Not on file           Allergies:   Patient has no known allergies.      Amee Rocha PA-C  [facility]  Pager: [phone]    A total of 60 minutes was spent with this patient, on chart review and documentation.      cc:   Jaxon Field MD  [address]  [id number]  [place] [address]

## 2021-12-08 NOTE — PATIENT INSTRUCTIONS
Today, we discussed the following:   - Results:     Shiraz, we need to draw blood on you after the visit today, to re-check your kidney function, since it hasn't been checked since .   - Medication changes:      I will let you know what medication changes are warranted, if any, once I see the results from today.   - Follow up:     With me in 3 months.    Have your sons help set you up for the COVID-19 booster.     Try to apply some lotion to your torso area to help with the itching. If this persists, let your PCP know.      Please, remember to continue the followin.  Weigh yourself daily. Call if your weight is up > than 2 pounds in one day, or 5 pounds in one week; if you feel more short of breath or have worsening swelling in your legs or abdomen.  2.  Eat a low sodium diet (less than 2,000mg or 2g daily). If you eat less salt, you will retain less fluid.   3.  Avoid alcohol, as this can worsen heart failure.   4.  Avoid NSAIDs as able (For example, Ibuprofen / aleve / advil / naprosen / diclofenac).    Please call CORE nurse for any questions or concerns Mon-Fri 8am-4pm:   [phone]  For concerns after hours:   [phone] option 2   Scheduling phone number:   [phone]    Thank you for visiting with us today.   Amee Rocha PA-C  ______________________________________________________________

## 2021-12-08 NOTE — TELEPHONE ENCOUNTER
[facility]      --- Message from Amee Rocha PA-C sent at 12/8/2021  1:26 PM CST -----  Unfortunately, Shiraz appears more pre-renal today. I would like him to stop his 5 mg PM dose of torsemide, and take just 10 mg daily. Of course, he should notify me with weight gain and/or dyspnea. Thanks.      I called Mr. Ingram's son Forrest (ok per consent to communicate) and reviewed above. He read back plan correctly.     Future Appointments   Date Time Provider Department Center   1/21/2022 12:00 AM [facility] TECH1 [facility]   3/15/2022 12:30 PM [facility] LAB [facility]   3/15/2022  1:30 PM Amee Rocha PA-C [facility]         Elly Osman RN BSN   4:18 PM 12/08/21

## 2021-12-16 DIAGNOSIS — K22.11 ULCER OF ESOPHAGUS WITH BLEEDING: ICD-10-CM

## 2021-12-16 DIAGNOSIS — Z95.2 S/P TAVR (TRANSCATHETER AORTIC VALVE REPLACEMENT): ICD-10-CM

## 2021-12-16 DIAGNOSIS — E78.5 HYPERLIPIDEMIA WITH TARGET LDL LESS THAN 100: ICD-10-CM

## 2021-12-16 DIAGNOSIS — I50.31 ACUTE DIASTOLIC HEART FAILURE (H): ICD-10-CM

## 2021-12-17 RX ORDER — PANTOPRAZOLE SODIUM 40 MG/1
TABLET, DELAYED RELEASE ORAL
Qty: 180 TABLET | Refills: 1 | Status: SHIPPED | OUTPATIENT
Start: 2021-12-17 | End: 2022-06-24

## 2021-12-17 RX ORDER — TORSEMIDE 5 MG/1
TABLET ORAL
Qty: 90 TABLET | OUTPATIENT
Start: 2021-12-17

## 2021-12-17 NOTE — TELEPHONE ENCOUNTER
Routing refill request to provider for review/approval because:  Osteoporosis is listed on problem list   No diagnosis of osteoporosis on record        Torsemide: Refused as 1 year supply was ordered 12/8/21    KATHLEEN BravoN RN  [facility]

## 2022-01-02 DIAGNOSIS — I10 HYPERTENSION GOAL BP (BLOOD PRESSURE) < 140/90: ICD-10-CM

## 2022-01-03 RX ORDER — METOPROLOL TARTRATE 25 MG/1
TABLET, FILM COATED ORAL
Qty: 180 TABLET | Refills: 1 | Status: SHIPPED | OUTPATIENT
Start: 2022-01-03 | End: 2022-06-24

## 2022-01-03 NOTE — TELEPHONE ENCOUNTER
Beta-Blockers Protocol Passed 01/02/2022 07:19 AM   Protocol Details  Blood pressure under 140/90 in past 12 months    Patient is age 6 or older    Recent (12 mo) or future (30 days) visit within the authorizing provider's specialty    Medication is active on med list

## 2022-01-11 DIAGNOSIS — I50.9 ACUTE ON CHRONIC CONGESTIVE HEART FAILURE, UNSPECIFIED HEART FAILURE TYPE (H): ICD-10-CM

## 2022-01-11 RX ORDER — ISOSORBIDE MONONITRATE 30 MG/1
30 TABLET, EXTENDED RELEASE ORAL DAILY
Qty: 90 TABLET | Refills: 1 | Status: SHIPPED | OUTPATIENT
Start: 2022-01-11 | End: 2022-06-23

## 2022-01-12 ENCOUNTER — OFFICE VISIT (OUTPATIENT)
Dept: FAMILY MEDICINE | Facility: CLINIC | Age: 87
End: 2022-01-12
Payer: COMMERCIAL

## 2022-01-12 ENCOUNTER — ANCILLARY PROCEDURE (OUTPATIENT)
Dept: GENERAL RADIOLOGY | Facility: CLINIC | Age: 87
End: 2022-01-12
Attending: FAMILY MEDICINE
Payer: COMMERCIAL

## 2022-01-12 VITALS
WEIGHT: 150 LBS | SYSTOLIC BLOOD PRESSURE: 101 MMHG | TEMPERATURE: 99.7 F | BODY MASS INDEX: 23.49 KG/M2 | DIASTOLIC BLOOD PRESSURE: 48 MMHG | HEART RATE: 65 BPM | OXYGEN SATURATION: 95 %

## 2022-01-12 DIAGNOSIS — M25.571 PAIN IN JOINT INVOLVING ANKLE AND FOOT, RIGHT: ICD-10-CM

## 2022-01-12 DIAGNOSIS — I50.42 CHRONIC COMBINED SYSTOLIC AND DIASTOLIC HEART FAILURE (H): ICD-10-CM

## 2022-01-12 DIAGNOSIS — M25.571 PAIN IN JOINT INVOLVING ANKLE AND FOOT, RIGHT: Primary | ICD-10-CM

## 2022-01-12 LAB
ANION GAP SERPL CALCULATED.3IONS-SCNC: 6 MMOL/L (ref 3–14)
BUN SERPL-MCNC: 48 MG/DL (ref 7–30)
CALCIUM SERPL-MCNC: 9.5 MG/DL (ref 8.5–10.1)
CHLORIDE BLD-SCNC: 104 MMOL/L (ref 94–109)
CO2 SERPL-SCNC: 26 MMOL/L (ref 20–32)
CREAT SERPL-MCNC: 2.01 MG/DL (ref 0.66–1.25)
GFR SERPL CREATININE-BSD FRML MDRD: 32 ML/MIN/1.73M2
GLUCOSE BLD-MCNC: 113 MG/DL (ref 70–99)
HGB BLD-MCNC: 10.2 G/DL (ref 13.3–17.7)
NT-PROBNP SERPL-MCNC: 8937 PG/ML (ref 0–450)
POTASSIUM BLD-SCNC: 4.9 MMOL/L (ref 3.4–5.3)
SODIUM SERPL-SCNC: 136 MMOL/L (ref 133–144)
URATE SERPL-MCNC: 9.9 MG/DL (ref 3.5–7.2)

## 2022-01-12 PROCEDURE — 85018 HEMOGLOBIN: CPT | Performed by: FAMILY MEDICINE

## 2022-01-12 PROCEDURE — 80048 BASIC METABOLIC PNL TOTAL CA: CPT | Performed by: FAMILY MEDICINE

## 2022-01-12 PROCEDURE — 99213 OFFICE O/P EST LOW 20 MIN: CPT | Performed by: FAMILY MEDICINE

## 2022-01-12 PROCEDURE — 73610 X-RAY EXAM OF ANKLE: CPT | Mod: 26 | Performed by: RADIOLOGY

## 2022-01-12 PROCEDURE — 84550 ASSAY OF BLOOD/URIC ACID: CPT | Performed by: FAMILY MEDICINE

## 2022-01-12 PROCEDURE — 83880 ASSAY OF NATRIURETIC PEPTIDE: CPT | Performed by: FAMILY MEDICINE

## 2022-01-12 PROCEDURE — 36415 COLL VENOUS BLD VENIPUNCTURE: CPT | Performed by: FAMILY MEDICINE

## 2022-01-12 NOTE — PROGRESS NOTES
Assessment & Plan     Pain in joint involving ankle and foot, right  -Had episode 6/2021 that seemed to self-resolve as patient does not remember episode. PCP planned to send to podiatry at the time if recurrent episode, will place referral today. Will also check uric acid levels for gout.   -Can take acetaminophen prn for pain, keep right foot elevated at rest  -Advised ED if fever, sever pain/swelling/redness  - XR Ankle Right G/E 3 Views -pending  - Uric acid  - Orthopedic  Referral    DO JUAN DIEGO Chen [facility]    Javon Weldon is a 87 year old who presents for the following health issues:    HPI     Patient with history of persistent a fib on Pradaxa, HF, CKD here for right ankle pain and swelling.  Started 1 week ago, pain across his ankle. States slightly worse, painful to move. Tried acetminophen for pain with some improvement. No history of gout in the past.   No history of trauma.   No numbness or tingling.   Had similar episode 6/2021, does not remember this. Saw PCP at the time. Had normal xray and US.    Pain History:  When did you first notice your pain? - Less than 1 week   Have you seen anyone else for your pain? No  Where in your body do you have pain? Started Saturday, pain has gotten increasingly worse. Pain is so bad that pt states he hasn't been able to sleep.    Review of Systems   CONSTITUTIONAL: NEGATIVE for fever, chills, change in weight  INTEGUMENTARY/SKIN: NEGATIVE for worrisome rashes, moles or lesions  RESP: NEGATIVE for significant cough or SOB  CV: NEGATIVE for chest pain, palpitations or peripheral edema       Objective    /48 (BP Location: Left arm, Patient Position: Sitting, Cuff Size: Adult Regular)   Pulse 65   Temp 99.7  F (37.6  C) (Temporal)   Wt 68 kg (150 lb)   SpO2 95%   BMI 23.49 kg/m    Body mass index is 23.49 kg/m .  Physical Exam   GENERAL: healthy, alert and no distress  RESP: breathing comfortably, no acute  respiratory distress  MS: 1-2 + swelling around right ankle, pain with ROM, distal pulses intact, no erythema or increased warmth  SKIN: no suspicious lesions or rashes  NEURO: Normal strength and tone, mentation intact and speech normal  PSYCH: mentation appears normal, affect normal/bright

## 2022-01-12 NOTE — PATIENT INSTRUCTIONS
Keep your ankle elevated when you are resting.  Take acetaminophen 500 mg every 4-6 hours as needed for pain.    Please follow-up with podiatry for further evaluation of your xray.

## 2022-01-13 ENCOUNTER — CARE COORDINATION (OUTPATIENT)
Dept: CARDIOLOGY | Facility: CLINIC | Age: 87
End: 2022-01-13
Payer: COMMERCIAL

## 2022-01-13 ENCOUNTER — TELEPHONE (OUTPATIENT)
Dept: FAMILY MEDICINE | Facility: CLINIC | Age: 87
End: 2022-01-13
Payer: COMMERCIAL

## 2022-01-13 NOTE — TELEPHONE ENCOUNTER
Pls call patient or patient's son.  Uric acid level elevated, likely gout in right ankle. Due to his chronic heart and kidney issues treatments are limited. We could try a short course of oral steroids, this may worsen is heart failure. Pls have him scheduled with Dr. Rodriguez in next few weeks to discuss gout in more detail. Let me know if he would like to try oral steroid.    Ty Wing, DO

## 2022-01-13 NOTE — TELEPHONE ENCOUNTER
Patient's son Willi returned call. Relayed message from Dr. Wing. They declined oral steroids. Scheduled patient with Dr. Rodriguez for follow up.     Stephanie Marroquin RN  [facility]

## 2022-01-13 NOTE — PROGRESS NOTES
"[facility]    Patient had lab work done yesterday(Wed 1/12/22), per Amee Rocha result note:  Appears these labs were erroneously drawn when patient was seen by his PCP today for possible gout. Incidentally note that his proBNP has nearly tripled from when I saw him about a month ago. Please follow up with him and ensure he is not experiencing CHF symptoms.      Called patient to assess. With assistance from , patient stated \"I feel fine.\"  He denied any changes to his breathing and added he is sleeping soundly without orthopnea/PND. He denied any LE swelling or any sensation of abdominal bloating.     Confirmed current diuretic regimen:   Torsemide 10mg/d    Current home weights:   1/13 150   1/12 150   1/11 152    Will update SD. Patient verbalized understanding and had no questions.  Erica Sam RN on 1/13/2022 at 9:59 AM    "

## 2022-01-13 NOTE — TELEPHONE ENCOUNTER
Called patient's son Willi and left a message for him to call back.    Stephanie Marroquin RN  [facility]

## 2022-01-21 ENCOUNTER — ANCILLARY PROCEDURE (OUTPATIENT)
Dept: CARDIOLOGY | Facility: CLINIC | Age: 87
End: 2022-01-21
Attending: INTERNAL MEDICINE
Payer: COMMERCIAL

## 2022-01-21 DIAGNOSIS — I49.5 SSS (SICK SINUS SYNDROME) (H): ICD-10-CM

## 2022-01-21 DIAGNOSIS — Z95.0 CARDIAC PACEMAKER IN SITU: ICD-10-CM

## 2022-01-21 PROCEDURE — 93294 REM INTERROG EVL PM/LDLS PM: CPT | Performed by: INTERNAL MEDICINE

## 2022-01-21 PROCEDURE — 93296 REM INTERROG EVL PM/IDS: CPT | Performed by: INTERNAL MEDICINE

## 2022-01-24 LAB
MDC_IDC_LEAD_IMPLANT_DT: NORMAL
MDC_IDC_LEAD_LOCATION: NORMAL
MDC_IDC_LEAD_LOCATION_DETAIL_1: NORMAL
MDC_IDC_LEAD_MFG: NORMAL
MDC_IDC_LEAD_MODEL: NORMAL
MDC_IDC_LEAD_POLARITY_TYPE: NORMAL
MDC_IDC_LEAD_SERIAL: NORMAL
MDC_IDC_MSMT_BATTERY_DTM: NORMAL
MDC_IDC_MSMT_BATTERY_REMAINING_LONGEVITY: 169 MO
MDC_IDC_MSMT_BATTERY_RRT_TRIGGER: 2.62
MDC_IDC_MSMT_BATTERY_STATUS: NORMAL
MDC_IDC_MSMT_BATTERY_VOLTAGE: 3.07 V
MDC_IDC_MSMT_LEADCHNL_RV_IMPEDANCE_VALUE: 285 OHM
MDC_IDC_MSMT_LEADCHNL_RV_IMPEDANCE_VALUE: 551 OHM
MDC_IDC_MSMT_LEADCHNL_RV_PACING_THRESHOLD_AMPLITUDE: 1 V
MDC_IDC_MSMT_LEADCHNL_RV_PACING_THRESHOLD_PULSEWIDTH: 0.4 MS
MDC_IDC_MSMT_LEADCHNL_RV_SENSING_INTR_AMPL: 8.25 MV
MDC_IDC_MSMT_LEADCHNL_RV_SENSING_INTR_AMPL: 8.25 MV
MDC_IDC_PG_IMPLANT_DTM: NORMAL
MDC_IDC_PG_MFG: NORMAL
MDC_IDC_PG_MODEL: NORMAL
MDC_IDC_PG_SERIAL: NORMAL
MDC_IDC_PG_TYPE: NORMAL
MDC_IDC_SESS_CLINIC_NAME: NORMAL
MDC_IDC_SESS_DTM: NORMAL
MDC_IDC_SESS_TYPE: NORMAL
MDC_IDC_SET_BRADY_HYSTRATE: NORMAL
MDC_IDC_SET_BRADY_LOWRATE: 50 {BEATS}/MIN
MDC_IDC_SET_BRADY_MODE: NORMAL
MDC_IDC_SET_LEADCHNL_RV_PACING_AMPLITUDE: 2 V
MDC_IDC_SET_LEADCHNL_RV_PACING_ANODE_ELECTRODE_1: NORMAL
MDC_IDC_SET_LEADCHNL_RV_PACING_ANODE_LOCATION_1: NORMAL
MDC_IDC_SET_LEADCHNL_RV_PACING_CAPTURE_MODE: NORMAL
MDC_IDC_SET_LEADCHNL_RV_PACING_CATHODE_ELECTRODE_1: NORMAL
MDC_IDC_SET_LEADCHNL_RV_PACING_CATHODE_LOCATION_1: NORMAL
MDC_IDC_SET_LEADCHNL_RV_PACING_POLARITY: NORMAL
MDC_IDC_SET_LEADCHNL_RV_PACING_PULSEWIDTH: 0.4 MS
MDC_IDC_SET_LEADCHNL_RV_SENSING_ANODE_ELECTRODE_1: NORMAL
MDC_IDC_SET_LEADCHNL_RV_SENSING_ANODE_LOCATION_1: NORMAL
MDC_IDC_SET_LEADCHNL_RV_SENSING_CATHODE_ELECTRODE_1: NORMAL
MDC_IDC_SET_LEADCHNL_RV_SENSING_CATHODE_LOCATION_1: NORMAL
MDC_IDC_SET_LEADCHNL_RV_SENSING_POLARITY: NORMAL
MDC_IDC_SET_LEADCHNL_RV_SENSING_SENSITIVITY: 0.9 MV
MDC_IDC_SET_ZONE_DETECTION_INTERVAL: 360 MS
MDC_IDC_SET_ZONE_TYPE: NORMAL
MDC_IDC_STAT_BRADY_DTM_END: NORMAL
MDC_IDC_STAT_BRADY_DTM_START: NORMAL
MDC_IDC_STAT_BRADY_RV_PERCENT_PACED: 35.28 %
MDC_IDC_STAT_EPISODE_RECENT_COUNT: 0
MDC_IDC_STAT_EPISODE_RECENT_COUNT_DTM_END: NORMAL
MDC_IDC_STAT_EPISODE_RECENT_COUNT_DTM_START: NORMAL
MDC_IDC_STAT_EPISODE_TOTAL_COUNT: 0
MDC_IDC_STAT_EPISODE_TOTAL_COUNT: 0
MDC_IDC_STAT_EPISODE_TOTAL_COUNT: 3
MDC_IDC_STAT_EPISODE_TOTAL_COUNT_DTM_END: NORMAL
MDC_IDC_STAT_EPISODE_TOTAL_COUNT_DTM_START: NORMAL
MDC_IDC_STAT_EPISODE_TYPE: NORMAL

## 2022-01-25 ENCOUNTER — OFFICE VISIT (OUTPATIENT)
Dept: FAMILY MEDICINE | Facility: CLINIC | Age: 87
End: 2022-01-25
Payer: COMMERCIAL

## 2022-01-25 VITALS
SYSTOLIC BLOOD PRESSURE: 134 MMHG | BODY MASS INDEX: 23.65 KG/M2 | OXYGEN SATURATION: 97 % | DIASTOLIC BLOOD PRESSURE: 78 MMHG | WEIGHT: 151 LBS | RESPIRATION RATE: 16 BRPM | HEART RATE: 62 BPM | TEMPERATURE: 98.4 F

## 2022-01-25 DIAGNOSIS — I48.20 CHRONIC ATRIAL FIBRILLATION, UNSPECIFIED (H): ICD-10-CM

## 2022-01-25 DIAGNOSIS — D69.6 THROMBOCYTOPENIA, UNSPECIFIED (H): ICD-10-CM

## 2022-01-25 DIAGNOSIS — D61.818 PANCYTOPENIA (H): ICD-10-CM

## 2022-01-25 DIAGNOSIS — J96.01 ACUTE RESPIRATORY FAILURE WITH HYPOXIA (H): ICD-10-CM

## 2022-01-25 DIAGNOSIS — M25.571 PAIN IN JOINT, ANKLE AND FOOT, RIGHT: Primary | ICD-10-CM

## 2022-01-25 DIAGNOSIS — N18.30 STAGE 3 CHRONIC KIDNEY DISEASE, UNSPECIFIED WHETHER STAGE 3A OR 3B CKD (H): ICD-10-CM

## 2022-01-25 PROCEDURE — T1013 SIGN LANG/ORAL INTERPRETER: HCPCS | Mod: U3

## 2022-01-25 PROCEDURE — 99214 OFFICE O/P EST MOD 30 MIN: CPT | Performed by: FAMILY MEDICINE

## 2022-01-25 ASSESSMENT — ASTHMA QUESTIONNAIRES: ACT_TOTALSCORE: 25

## 2022-01-25 NOTE — PROGRESS NOTES
Assessment & Plan     Pain in joint, ankle and foot, right  Symptoms resolved day after his last visit with Dr. Wing.  His uric acid level is high and we discussed about dietary changes that can help him to avoid possible future episode of gout.  He understood.    (D69.6) Thrombocytopenia, unspecified (H)  Stable.  No recent labs.    (J96.01) Acute respiratory failure with hypoxia (H)  Breathing is stable.    (I48.20) Chronic atrial fibrillation, unspecified (H)  Asymptomatic right now.  On beta-blocker.    (N18.30) Stage 3 chronic kidney disease, unspecified whether stage 3a or 3b CKD (H)  Comment:    Plan:  Not a good candidate of nsaids for gout.     (D61.818) Pancytopenia (H)  Comment:    Plan:  See above.                        No follow-ups on file.    Dany Rodriguez MD, MD  [facility] [place]    Javon Weldon is a 87 year old who presents for the following health issues  accompanied by his .    HPI     Gout/ Single Inflamed Joint  Onset/Duration:   Description:   Location: ankle - right  Joint Swelling: YES- slightly   Redness: no  Pain: YES  Intensity: severe  Progression of Symptoms: improving  Accompanying Signs & Symptoms:  Fevers: no  History:   Trauma to the area: no  Previous history of gout: no  Recent illness: no  Alcohol use: no  Diuretic use: no  Precipitating or alleviating factors: None  Therapies tried and outcome: aspirin helped     Pacemaker is doing just fine. Got his booster.   Breathing is fine.   Weight is stable. Heart is fine.   Right leg mild swelling.   Next day pain improved after his visit. Did not need any medication.     Usually bp is better than this. This morning 126/69 and last night 127/70 and weight is stable too. Checking bp and weight regularly.     Review of Systems         Objective    /78 (BP Location: Left arm, Patient Position: Sitting, Cuff Size: Adult Regular)   Pulse 62   Temp 98.4  F (36.9  C) (Temporal)    Resp 16   Wt 68.5 kg (151 lb)   SpO2 97%   BMI 23.65 kg/m    Body mass index is 23.65 kg/m .  Physical Exam   Minimal right ankle swelling.   Answered his questions via .

## 2022-01-26 PROBLEM — D69.6 THROMBOCYTOPENIA, UNSPECIFIED (H): Status: ACTIVE | Noted: 2022-01-26

## 2022-02-11 ENCOUNTER — APPOINTMENT (OUTPATIENT)
Dept: GENERAL RADIOLOGY | Facility: CLINIC | Age: 87
End: 2022-02-11
Attending: EMERGENCY MEDICINE
Payer: COMMERCIAL

## 2022-02-11 ENCOUNTER — HOSPITAL ENCOUNTER (EMERGENCY)
Facility: CLINIC | Age: 87
Discharge: HOME | End: 2022-02-11
Attending: EMERGENCY MEDICINE | Admitting: EMERGENCY MEDICINE
Payer: COMMERCIAL

## 2022-02-11 VITALS
OXYGEN SATURATION: 95 % | DIASTOLIC BLOOD PRESSURE: 91 MMHG | TEMPERATURE: 97.6 F | SYSTOLIC BLOOD PRESSURE: 176 MMHG | RESPIRATION RATE: 20 BRPM | HEART RATE: 51 BPM

## 2022-02-11 DIAGNOSIS — S42.291A OTHER CLOSED DISPLACED FRACTURE OF PROXIMAL END OF RIGHT HUMERUS, INITIAL ENCOUNTER: ICD-10-CM

## 2022-02-11 PROCEDURE — 99284 EMERGENCY DEPT VISIT MOD MDM: CPT | Mod: 25

## 2022-02-11 PROCEDURE — 250N000013 HC RX MED GY IP 250 OP 250 PS 637: Performed by: EMERGENCY MEDICINE

## 2022-02-11 PROCEDURE — 23600 CLTX PROX HUMRL FX W/O MNPJ: CPT | Mod: RT

## 2022-02-11 PROCEDURE — 73060 X-RAY EXAM OF HUMERUS: CPT | Mod: RT

## 2022-02-11 RX ORDER — MORPHINE SULFATE 15 MG/1
15 TABLET ORAL EVERY 4 HOURS PRN
Qty: 15 TABLET | Refills: 0 | Status: SHIPPED | OUTPATIENT
Start: 2022-02-11 | End: 2022-02-23

## 2022-02-11 RX ORDER — ACETAMINOPHEN 500 MG
1000 TABLET ORAL ONCE
Status: COMPLETED | OUTPATIENT
Start: 2022-02-11 | End: 2022-02-11

## 2022-02-11 RX ADMIN — ACETAMINOPHEN 1000 MG: 500 TABLET ORAL at 18:21

## 2022-02-11 ASSESSMENT — ENCOUNTER SYMPTOMS
ABDOMINAL PAIN: 0
ARTHRALGIAS: 1

## 2022-02-11 NOTE — ED NOTES
Per Son slipped and fell on ice 30mins PTA, Son reports right arm pain. Son reports on blood thinners, denies LOC, denies hitting head.

## 2022-02-11 NOTE — ED PROVIDER NOTES
History     Chief Complaint:  Fall     The history is provided by the patient and a relative. A  was used (ASL, son).      Shiraz Ingram is a 87 year old male on Pradaxa with history of atrial fibrillation, CHF, aortic stenosis, hypertension, hyperlipidemia, asthma who presents with right shoulder and elbow pain after a mechanical fall which occurred approximately 30 minutes ago. His son reports that Shiraz was outside walking his dog when he slipped on ice and fell onto his right arm. He did not hit his head and no loss of consciousness occurred. He was feeling at his baseline prior to the fall. After the fall, he has been ambulatory but has had ongoing right arm pain. He denies chest pain, abdominal pain.    Review of Systems   Cardiovascular: Negative for chest pain.   Gastrointestinal: Negative for abdominal pain.   Musculoskeletal: Positive for arthralgias.   All other systems reviewed and are negative.    Allergies:  The patient has no known allergies.     Medications:  Albuterol inhaler  Aspirin 81 mg  Pulmicort nebulization  Pradaxa  Hydralazine  Duoneb nebulization  Imdur  Meclizine  Melatonin  Metoprolol  Singulair  Nitroglycerin  Protonix  Zocor  Torsemide  Lisinopril    Past Medical History:     Allergic rhinitis  Anemia  Benign neoplasm of colon  CHF  CKD stage III  Pneumonia  Coronary atherosclerosis  Esophageal ulcer with upper GI bleed  Hypertension  Hyperlipidemia  Osteoarthrosis  Asthma  Persistent atrial fibrillation  Hearing loss  Osteoporosis  Iron deficiency anemia  Vitamin B12 deficiency  Coronary artery disease   Hyperkalemia  Thrombus of left atrial appendage  Aortic stenosis, severe  Acute respiratory failure with hypoxia  Anemia of chronic disease  Thrombocytopenia    Past Surgical History:    Cardiac surgery  Colonoscopy x3  Coronary angiogram graft  Right heart catheterization  Aortic valve replacement  Pacemaker placement  EGD  Inguinal hernia repair,  right  Cholecystectomy  Coronary artery bypass     Family History:    Father: coronary artery disease  Mother: breast cancer  Brother: heart disease   Son: cerebrovascular disease, CABG    Social History:  The patient presents to the ED with his son.  The patient presents to the ED via car.    Physical Exam     Patient Vitals for the past 24 hrs:   BP Temp Temp src Pulse Resp SpO2   02/11/22 1721 (!) 176/91 97.6  F (36.4  C) Temporal 51 20 95 %     Physical Exam  Eye:  Pupils are equal, round, and reactive.  Extraocular movements intact.    ENT:  No rhinorrhea.  Moist mucus membranes.  Normal tongue and tonsil.    Cardiac:  Regular rate and rhythm.  No murmurs, gallops, or rubs.    Pulmonary:  Clear to auscultation bilaterally.  No wheezes, rales, or rhonchi.    Abdomen:  Positive bowel sounds.  Abdomen is soft and non-distended, without focal tenderness.    Musculoskeletal:  Normal movement of all extremities without evidence for deficit, save for the right shoulder. There is focal tenderness over the proximal humerus. No pain at the clavicle, scapula, or elbow to fingers. No trauma noted to the skull. No midline tenderness to the neck.    Skin:  Warm and dry without rashes. The right hand is warm with strong radial pulses.    Neurologic:  Non-focal exam without asymmetric weakness or numbness. Normal strength and sensation to the fingers and wrist.    Psychiatric:  Normal affect with appropriate interaction with examiner.    Emergency Department Course     Imaging:  Humerus XR, G/E 2 views, right   Final Result   IMPRESSION: Complex fracture of the proximal humerus with impaction at the surgical/anatomic neck. There is a large displaced greater tuberosity fracture fragment and a horizontal cortical fracture fragment along the proximal humeral diaphysis. The    lesser tuberosity is not well-visualized. The humeral head is posteriorly rotated relative to the glenoid. Inferior subluxation of the humeral head without  dislocation. No glenoid fracture is evident. The acromioclavicular joint is unremarkable.    Osteopenia.      Report per radiology    Emergency Department Course:       Reviewed:  I reviewed nursing notes, vitals, past medical history, Care Everywhere    Assessments:  1746 I obtained history and examined the patient as noted above.   1827 I rechecked the patient and explained findings.     Interventions:  1821 Tylenol 1 g PO    Disposition:  The patient was discharged to home.     Impression & Plan     Medical Decision Making:  Shiraz Ingram is a 87 year old male who presents to the emergency department for evaluation of right shoulder pain after suffering a fall on the ice while walking his dog.  He has a comminuted proximal humerus fracture.  Otherwise, he is neurovascularly intact.  He was placed into a sling.  I feel he is safe for discharge home with close orthopedic follow-up to determine how this will heal and whether he will require surgical intervention.  He and his sons questions were answered and they are comfortable with the plan for discharge.  There is no other evidence of trauma, specifically to the head or spine and I do not believe he requires labs or further work-up at this time.    Diagnosis:    ICD-10-CM    1. Other closed displaced fracture of proximal end of right humerus, initial encounter  S42.291A      Discharge Medications:  START taking these medications    Details   morphine (MSIR) 15 MG IR tablet Take 1 tablet (15 mg) by mouth every 4 hours as needed for severe pain, Disp-15 tablet, R-0, Local Print     Scribe Disclosure:  I, Erika Flood, am serving as a scribe at 5:46 PM on 2/11/2022 to document services personally performed by Trierweiler, Chad A, MD based on my observations and the provider's statements to me.        Trierweiler, Chad A, MD  02/12/22 0015

## 2022-02-11 NOTE — ED TRIAGE NOTES
Patient's guest at desk. States his father feels dizzy. RN to evaluate patient. Patient is non-hearing and using ASL to communicate. Patient's son interpreting at this time.     Patient reports dizzy from the pain in his right shoulder and right elbow after fall today. Patient states he did not hit his head or lose consciousness. No nystagmus present.    Patient and guest updated on wait for triage.     Continue to monitor.

## 2022-02-11 NOTE — ED TRIAGE NOTES
Patient splinting right arm to body. Ambulatory with stable gait. Patient ambulated back to ED room 5 with family member. Report given to JOSE Kim.      paged. Language services will updated with ETA once determined.

## 2022-02-16 ENCOUNTER — TRANSFERRED RECORDS (OUTPATIENT)
Dept: HEALTH INFORMATION MANAGEMENT | Facility: CLINIC | Age: 87
End: 2022-02-16
Payer: COMMERCIAL

## 2022-02-18 DIAGNOSIS — Z11.59 ENCOUNTER FOR SCREENING FOR OTHER VIRAL DISEASES: Primary | ICD-10-CM

## 2022-02-23 ENCOUNTER — OFFICE VISIT (OUTPATIENT)
Dept: FAMILY MEDICINE | Facility: CLINIC | Age: 87
End: 2022-02-23
Payer: COMMERCIAL

## 2022-02-23 VITALS
SYSTOLIC BLOOD PRESSURE: 138 MMHG | TEMPERATURE: 99.1 F | BODY MASS INDEX: 25.06 KG/M2 | OXYGEN SATURATION: 97 % | RESPIRATION RATE: 16 BRPM | DIASTOLIC BLOOD PRESSURE: 78 MMHG | HEART RATE: 60 BPM | WEIGHT: 160 LBS

## 2022-02-23 DIAGNOSIS — D63.8 ANEMIA OF CHRONIC DISEASE: ICD-10-CM

## 2022-02-23 DIAGNOSIS — I10 HYPERTENSION GOAL BP (BLOOD PRESSURE) < 140/90: ICD-10-CM

## 2022-02-23 DIAGNOSIS — S42.291G: ICD-10-CM

## 2022-02-23 DIAGNOSIS — I35.0 SEVERE AORTIC STENOSIS: ICD-10-CM

## 2022-02-23 DIAGNOSIS — N18.32 STAGE 3B CHRONIC KIDNEY DISEASE (H): ICD-10-CM

## 2022-02-23 DIAGNOSIS — H91.93 BILATERAL DEAFNESS: ICD-10-CM

## 2022-02-23 DIAGNOSIS — I50.42 CHRONIC COMBINED SYSTOLIC AND DIASTOLIC HEART FAILURE (H): ICD-10-CM

## 2022-02-23 DIAGNOSIS — Z01.818 PREOP GENERAL PHYSICAL EXAM: Primary | ICD-10-CM

## 2022-02-23 DIAGNOSIS — Z95.1 S/P CABG (CORONARY ARTERY BYPASS GRAFT): ICD-10-CM

## 2022-02-23 DIAGNOSIS — I48.21 PERMANENT ATRIAL FIBRILLATION (H): ICD-10-CM

## 2022-02-23 LAB
ERYTHROCYTE [DISTWIDTH] IN BLOOD BY AUTOMATED COUNT: 18.8 % (ref 10–15)
HCT VFR BLD AUTO: 30.1 % (ref 40–53)
HGB BLD-MCNC: 9.2 G/DL (ref 13.3–17.7)
MCH RBC QN AUTO: 20.6 PG (ref 26.5–33)
MCHC RBC AUTO-ENTMCNC: 30.6 G/DL (ref 31.5–36.5)
MCV RBC AUTO: 67 FL (ref 78–100)
PLATELET # BLD AUTO: 230 10E3/UL (ref 150–450)
RBC # BLD AUTO: 4.47 10E6/UL (ref 4.4–5.9)
WBC # BLD AUTO: 3.9 10E3/UL (ref 4–11)

## 2022-02-23 PROCEDURE — 85027 COMPLETE CBC AUTOMATED: CPT | Performed by: FAMILY MEDICINE

## 2022-02-23 PROCEDURE — 99215 OFFICE O/P EST HI 40 MIN: CPT | Performed by: FAMILY MEDICINE

## 2022-02-23 PROCEDURE — 36415 COLL VENOUS BLD VENIPUNCTURE: CPT | Performed by: FAMILY MEDICINE

## 2022-02-23 PROCEDURE — 80053 COMPREHEN METABOLIC PANEL: CPT | Performed by: FAMILY MEDICINE

## 2022-02-23 PROCEDURE — U0003 INFECTIOUS AGENT DETECTION BY NUCLEIC ACID (DNA OR RNA); SEVERE ACUTE RESPIRATORY SYNDROME CORONAVIRUS 2 (SARS-COV-2) (CORONAVIRUS DISEASE [COVID-19]), AMPLIFIED PROBE TECHNIQUE, MAKING USE OF HIGH THROUGHPUT TECHNOLOGIES AS DESCRIBED BY CMS-2020-01-R: HCPCS | Performed by: FAMILY MEDICINE

## 2022-02-23 PROCEDURE — U0005 INFEC AGEN DETEC AMPLI PROBE: HCPCS | Performed by: FAMILY MEDICINE

## 2022-02-23 PROCEDURE — 93000 ELECTROCARDIOGRAM COMPLETE: CPT | Performed by: FAMILY MEDICINE

## 2022-02-23 RX ORDER — HYDROCODONE BITARTRATE AND ACETAMINOPHEN 5; 325 MG/1; MG/1
1 TABLET ORAL EVERY 6 HOURS PRN
Qty: 10 TABLET | Refills: 0 | Status: ON HOLD | OUTPATIENT
Start: 2022-02-23 | End: 2022-02-25

## 2022-02-23 NOTE — H&P (VIEW-ONLY)
[facility] [address] [place] [address]  Phone: [phone]  Primary Provider: Nathan Rodriguez  Pre-op Performing Provider:    NATHAN RODRIGUEZ  [facility]       PREOPERATIVE EVALUATION:  Today's date: 2/23/2022    Shiraz Ingram is a 87 year old male who presents for a preoperative evaluation.    Surgical Information:  Surgery/Procedure: RIGHT REVERSE SHOULDER ARTHROPLASTY WITH NO CUSTOM GUIDE  Surgery Location: [facility]   Surgeon: Wiley Vargas MD  Surgery Date: 02/25/2022  Time of Surgery: 12:30pm  Where patient plans to recover: At home with family  Fax number for surgical facility: Note does not need to be faxed, will be available electronically in Epic.    Type of Anesthesia Anticipated: General    Assessment & Plan     The proposed surgical procedure is considered INTERMEDIATE risk.    Preop general physical exam     - EKG 12-lead complete w/read - Clinics  - HYDROcodone-acetaminophen (NORCO) 5-325 MG tablet; Take 1 tablet by mouth every 6 hours as needed for pain  - CBC with platelets; Future  - Comprehensive metabolic panel (BMP + Alb, Alk Phos, ALT, AST, Total. Bili, TP); Future  - Asymptomatic COVID-19 Virus (Coronavirus) by PCR Nose  - CBC with platelets  - Comprehensive metabolic panel (BMP + Alb, Alk Phos, ALT, AST, Total. Bili, TP)    Closed 4-part fracture of proximal humerus with delayed healing, right  Morphine - nausea. Due to poor renal function and surgery in less than 2 days, OK to use few norco. Signed rx. Side effects discussed.      Hypertension goal BP (blood pressure) < 140/90  bp usually on lower side but stick to same rx. bp stable today.     Stage 3b chronic kidney disease (H)  Can contribute adversely with anaesthesia. Monitor renal function. Obtain labs today.     Severe aortic stenosis -- S/P TAVR 12/2020  Recent TAVR. Doing OK.     CAD, S/P CABG in 2002  Seeing cardiology. Stable. On statin.   Also complete heart block  and s/p pacemaker.     Permanent atrial fibrillation (H)  Current EKG still shows afib. History of MAYA thrombus. On anticoagulant. Ideally should be seeing cardiology for cardiac clearance but his surgery is scheduled day after tomorrow. See pt instructions.     Chronic combined systolic and diastolic heart failure (H)  See above. Weight stable.  Ideally should be cleared by cardiology but due to limited time, OK to proceed as per patient instructions. Consider cardiology and hospitalist input during hospitalization.     Bilateral deafness  Needing .     Anemia of chronic disease (baseline Hgb 9.0-10.5)  Obtain labs today. History of GI bleed. Avoid nsaids.   Monitor post operatively.           Risks and Recommendations:  The patient has the following additional risks and recommendations for perioperative complications:   - Consult Hospitalist / IM to assist with post-op medical management    Medication Instructions:  Patient Instructions   Hold off on pradaxa (dabigatran) blood thinner until after surgery. Ideally it should be stopped atleast 2-3 days before surgery.     Ok to take rest of your medications as it is.    On the day of surgery in the morning - take only hydralazine, isosorbide mononitrate,metoprolol, torsemide with few sips of water. Skip rest of your morning pills.         Ideally pradaxa should be on hold for 2-3 days before surgery but he already took his morning dose, ok to hold for rest of the doses until after surgery. He understood. See med changes on the day of surgery.     RECOMMENDATION:  APPROVAL GIVEN to proceed with proposed procedure, without further diagnostic evaluation.    Review of external notes as documented above       Diagnosis or treatment significantly limited by social determinants of health - deafness, advanced age and chronic health conditions.     I spent a total of 65 minutes on the day of the visit.   Time spent doing chart review, history and  exam, documentation and further activities per the note        Subjective     HPI related to upcoming procedure: fell down and broke right shoulder.     1. Yes - Have you ever had a heart attack or stroke? Heart attack  2. Yes - Have you ever had surgery on your heart or blood vessels, such as a stent, coronary (heart) bypass, or surgery on an artery in the head, neck, heart, or legs? Triple bypass  3. No - Do you have chest pain when you are physically active?  4. No - Do you have a history of heart failure?  5. No - Do you currently have a cold, bronchitis, or symptoms of other respiratory (head and chest) infections?  6. No - Do you have a cough, shortness of breath, or wheezing?  7. No - Do you or anyone in your family have a history of blood clots?  8. No - Do you or anyone in your family have a serious bleeding problem, such as long-lasting bleeding after surgeries or cuts?  9. No - Have you ever had anemia or been told to take iron pills?  10. No - Have you had any abnormal blood loss such as black, tarry or bloody stools, or abnormal vaginal bleeding?  11. No - Have you ever had a blood transfusion?  12. Yes - Are you willing to have a blood transfusion if it is medically needed before, during, or after your surgery?  13. No - Have you or anyone in your family ever had problems with anesthesia (sedation for surgery)?  14. No - Do you have sleep apnea, excessive snoring, or daytime drowsiness?   15. Yes - Do you have any artifical heart valves or other implanted medical devices, such as a pacemaker, defibrillator, or continuous glucose monitor?   What type of device? pacemaker What is the name of the clinic that manages your device? [facility] cardiology  16. No - Do you have any artifical joints?  17. No - Are you allergic to latex?  18. No - Is there any chance that you may be pregnant?    Tylenol for pain. Pain is not under good control. 1000mg bid. Morphine does not work well - causes nausea.   Movement hurts  more.   Keeping it in sling.       Health Care Directive:  Patient does not have a Health Care Directive or Living Will: Discussed advance care planning with patient; information given to patient to review.    Preoperative Review of :   reviewed - controlled substances reflected in medication list.     afib - on anticoagulant. Asymtomatic. Chronic.   Complete heart block - has pacemaker.  Heart failure - seeing cardiologist. No change in medications or weight. Breathing is OK.   HTN - taking bp meds along with heart meds.   History of GI bleed and chronic anemia. On antacid.   Kidney function is not great. Not using nsaids.  Shoulder is painful and only using tylenol. Not helping much. Morphine was too much and developed nausea.     Review of Systems  Constitutional, neuro, ENT, endocrine, pulmonary, cardiac, gastrointestinal, genitourinary, musculoskeletal, integument and psychiatric systems are negative, except as otherwise noted.    Patient Active Problem List    Diagnosis Date Noted     Bilateral deafness 02/23/2022     Priority: Medium     Using        Thrombocytopenia, unspecified (H) 01/26/2022     Priority: Medium     Pain in joint involving ankle and foot, right 01/12/2022     Priority: Medium     Chronic combined systolic and diastolic heart failure (H) 12/08/2021     Priority: Medium     Hx of Esophageal ulcer -- Cauterized 5/24/20 03/16/2021     Priority: Medium     Anemia of chronic disease (baseline Hgb 9.0-10.5) 03/16/2021     Priority: Medium     Acute respiratory failure with hypoxia (H) 03/15/2021     Priority: Medium     Acute congestive heart failure, unspecified heart failure type (H) 10/27/2020     Priority: Medium     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 07/14/2020     Priority: Medium     Aortic stenosis, severe 07/14/2020     Priority: Medium     Persistent Afib -- on Pradaxa  07/13/2020     Priority: Medium     Thrombus of left atrial appendage 07/13/2020      Priority: Medium     Pneumonia 07/03/2020     Priority: Medium     GI bleed 05/24/2020     Priority: Medium     Hyperkalemia 05/11/2020     Priority: Medium     Acute respiratory failure (H) 04/23/2020     Priority: Medium     Acute on chronic congestive heart failure, unspecified heart failure type (H) 04/23/2020     Priority: Medium     Added automatically from request for surgery [id number]       Severe aortic stenosis -- S/P TAVR 12/2020 04/23/2020     Priority: Medium     Added automatically from request for surgery [id number]       Coronary artery disease involving native coronary artery of native heart without angina pectoris 04/23/2020     Priority: Medium     Added automatically from request for surgery [id number]       CAD, S/P CABG in 2002 04/23/2020     Priority: Medium     Added automatically from request for surgery [id number]       Health Care Home 02/25/2013     Priority: Medium     EMERGENCY CARE PLAN  Presenting Problem Signs and Symptoms Treatment Plan    Questions or conerns during clinic hours    I will call the clinic directly     Questions or conerns outside clinic hours    I will call the 24 hour nurse line at [phone]    Patient needs to schedule an appointment    I will call the 24 hour scheduling team at [phone] or clinic directly    Same day treatment     I will call the clinic first, nurse line if after hours, urgent care and express care if needed                            DX V65.8 REPLACED WITH 10084 HEALTH CARE HOME (04/08/2013)       Iron deficiency anemia 02/23/2013     Priority: Medium     Vitamin B 12 deficiency 02/23/2013     Priority: Medium     Advanced directives, counseling/discussion 02/20/2013     Priority: Medium     Pt stated upon admission that he has a HCD and on of his sons will bring to [facility].       CKD (chronic kidney disease) stage 3, GFR 30-59 ml/min (H) 05/12/2011     Priority: Medium     Hyperlipidemia with target LDL less than 100      Priority: Medium     Diagnosis  updated by automated process. Provider to review and confirm.       Hypertension goal BP (blood pressure) < 140/90      Priority: Medium     Osteoporosis 04/30/2007     Priority: Medium     Problem list name updated by automated process. Provider to review       Moderate persistent asthma 08/13/2005     Priority: Medium     Benign neoplasm of colon 06/10/2004     Priority: Medium     Ademonatous polyp       Localized osteoarthrosis, lower leg 06/10/2004     Priority: Medium     left knee  Problem list name updated by automated process. Provider to review       Allergic rhinitis 06/10/2004     Priority: Medium     Problem list name updated by automated process. Provider to review        Past Medical History:   Diagnosis Date     Allergic rhinitis, cause unspecified      Anemia, unspecified      Aortic stenosis S/P TAVR      Benign neoplasm of colon 1999    Ademonatous polyp     CHF 2nd to TAVR -- S/P Pacemaker 12/2020     CKD (chronic kidney disease) stage 3, GFR 30-59 ml/min (H) 05/12/2011     Community acquired pneumonia 9/29/2020     Coronary atherosclerosis of unspecified type of vessel, native or graft     s/p CABG 2002     Esophageal ulcer w UGI bleed 05/24/2020    Had EGD adn caurtery 5/24/20, colonoscopy with diverticulosis then     Hyperlipidemia LDL goal < 100      Hypertension goal BP (blood pressure) < 140/90      Localized osteoarthrosis not specified whether primary or secondary, lower leg     left knee     Mild persistent asthma without complication 1/11/2016     Moderate persistent asthma      Persistent atrial fibrillation (H)      Unspecified hearing loss      Past Surgical History:   Procedure Laterality Date     CARDIAC SURGERY       COLONOSCOPY N/A 5/26/2020    Procedure: COLONOSCOPY;  Surgeon: Ignacio Fournier MD;  Location:  GI     CV ANGIOGRAM CORONARY GRAFT N/A 4/24/2020    Procedure: Angiogram Coronary Graft;  Surgeon: Addison Willard MD;  Location:  HEART CARDIAC CATH LAB      CV CORONARY ANGIOGRAM N/A 4/24/2020    Procedure: Coronary Angiogram;  Surgeon: Addison Willard MD;  Location:  HEART CARDIAC CATH LAB     CV RIGHT HEART CATH MEASUREMENTS RECORDED N/A 4/24/2020    Procedure: Right Heart Cath;  Surgeon: Addison Willard MD;  Location:  HEART CARDIAC CATH LAB     CV TRANSCATHETER AORTIC VALVE REPLACEMENT N/A 7/14/2020    Procedure: Transcatheter Aortic Valve Replacement;  Surgeon: Soraida Monet MD;  Location:  HEART CARDIAC CATH LAB     EP PACEMAKER N/A 7/15/2020    Procedure: EP Pacemaker;  Surgeon: Tommie Sauer MD;  Location:  HEART CARDIAC CATH LAB     HC ESOPHAGOSCOPY, DIAGNOSTIC  5/03    Dr. Dow, lower esophageal ring & mild gastritis     HERNIORRHAPHY INGUINAL Right 9/26/2016    Procedure: HERNIORRHAPHY INGUINAL;  Surgeon: Alexis Alexandra MD;  Location: [facility]     LAPAROSCOPIC CHOLECYSTECTOMY  12/03    for biliary sludge     ZZC NONSPECIFIC PROCEDURE  5/02    Coronary artery bypass     [facility]CAUTERY TUMOR/POLYP/LESION  5/03    Dr. Dow, Q 5 years     ZZ COLONOSCOPY THRU STOMA W BIOPSY/CAUTERY TUMOR/POLYP/LESION  12/199    Dr. Dow, one adenomatous polyp     Current Outpatient Medications   Medication Sig Dispense Refill     albuterol (PROAIR HFA/PROVENTIL HFA/VENTOLIN HFA) 108 (90 Base) MCG/ACT inhaler INHALE 1 TO 2 PUFFS BY MOUTH EVERY 4 TO 6 HOURS AS NEEDED 18 g 2     aspirin (ASA) 81 MG EC tablet Take 81 mg by mouth daily       budesonide (PULMICORT) 0.5 MG/2ML nebulizer solution Take 2 mLs (0.5 mg) by nebulization 2 times daily 120 mL 0     calcium carbonate 600 mg-vitamin D 400 units (CALTRATE) 600-400 MG-UNIT per tablet Take 1 tablet by mouth daily       dabigatran ANTICOAGULANT (PRADAXA) 150 MG capsule Take 1 capsule (150 mg) by mouth 2 times daily 180 capsule 3     ferrous sulfate (IRON) 325 (65 Fe) MG tablet TAKE 1 TABLET(325 MG) BY MOUTH TWICE DAILY 180 tablet 0     hydrALAZINE  (APRESOLINE) 25 MG tablet Take 0.5 tablets (12.5 mg) by mouth 3 times daily 135 tablet 3     HYDROcodone-acetaminophen (NORCO) 5-325 MG tablet Take 1 tablet by mouth every 6 hours as needed for pain 10 tablet 0     ipratropium - albuterol 0.5 mg/2.5 mg/3 mL (DUONEB) 0.5-2.5 (3) MG/3ML nebulization Inhale 1 vial (3 mLs) into the lungs 4 times daily 360 mL 11     isosorbide mononitrate (IMDUR) 30 MG 24 hr tablet Take 1 tablet (30 mg) by mouth daily 90 tablet 1     magnesium chloride (MAG64) 535 (64 Mg) MG TBEC CR tablet Take 1 tablet (535 mg) by mouth 2 times daily 180 tablet 3     meclizine (ANTIVERT) 12.5 MG tablet Take 1 tablet (12.5 mg) by mouth 3 times daily as needed for dizziness 90 tablet 1     melatonin 3 MG tablet Take 1 tablet (3 mg) by mouth nightly as needed for sleep 30 tablet 0     metoprolol tartrate (LOPRESSOR) 25 MG tablet TAKE 1 TABLET BY MOUTH TWICE A  tablet 1     montelukast (SINGULAIR) 10 MG tablet TAKE 1 TABLET BY MOUTH EVERY DAY 90 tablet 1     multivitamin, therapeutic (THERA-VIT) TABS tablet Take 1 tablet by mouth every evening        nitroGLYcerin (NITROSTAT) 0.4 MG sublingual tablet For chest pain place 1 tablet under the tongue every 5 minutes for 3 doses. If symptoms persist 5 minutes after 1st dose call 911. 12 tablet 0     pantoprazole (PROTONIX) 40 MG EC tablet TAKE 1 TABLET BEFORE MEALS TWICE A  tablet 1     simvastatin (ZOCOR) 40 MG tablet Take 1 tablet (40 mg) by mouth At Bedtime 90 tablet 3     torsemide (DEMADEX) 5 MG tablet Take 2 tablets (10 mg) by mouth daily 180 tablet 3       No Known Allergies     Social History     Tobacco Use     Smoking status: Never Smoker     Smokeless tobacco: Never Used     Tobacco comment: smoked for a week in 's   Substance Use Topics     Alcohol use: Not Currently     Alcohol/week: 0.0 standard drinks     Family History   Problem Relation Age of Onset     C.A.D. Father      Cancer Mother         breast cancer,  of pneumonia      Cerebrovascular Disease Son      CABG Son      Family History Negative Child      Family History Negative Sister      Heart Disease Brother      History   Drug Use No         Objective     /78 (BP Location: Left arm, Patient Position: Sitting, Cuff Size: Adult Regular)   Pulse 60   Temp 99.1  F (37.3  C) (Temporal)   Resp 16   Wt 72.6 kg (160 lb)   SpO2 97%   BMI 25.06 kg/m      Physical Exam    GENERAL APPEARANCE: healthy, alert and no distress     EYES: EOMI,  PERRL     HENT: ear canals and TM's normal and nose and mouth without ulcers or lesions     NECK: no adenopathy, no asymmetry, masses, or scars and thyroid normal to palpation     RESP: lungs clear to auscultation - no rales, rhonchi or wheezes     CV: palpable pacemaker, heart rate minimally irregular rhythm.      MS: right shoulder in sling. Mild bruising of fingers and minimal swelling.      SKIN: no suspicious lesions or rashes     NEURO: Normal strength and tone, sensory exam grossly normal, mentation intact and speech normal     PSYCH: mentation appears normal. and affect normal/bright     LYMPHATICS: No cervical adenopathy    Recent Labs   Lab Test 01/12/22  1028 12/08/21  1102 06/07/21  0956 03/22/21  0956 03/16/21  0542 03/15/21  1254 07/14/20  1147 07/13/20  1420   HGB 10.2*  --  10.3* 9.0*   < > 10.6*   < > 11.5*   PLT  --   --  116* 227   < > 136*   < > 168   INR  --   --   --   --   --  1.51*  --  1.21*    139 136 136   < > 137   < > 142   POTASSIUM 4.9 4.8 4.3 5.0   < > 4.4   < > 4.2   CR 2.01* 1.81* 1.72* 1.65*   < > 1.83*   < > 1.40*    < > = values in this interval not displayed.        Diagnostics:  Recent Results (from the past 24 hour(s))   CBC with platelets    Collection Time: 02/23/22  2:12 PM   Result Value Ref Range    WBC Count 3.9 (L) 4.0 - 11.0 10e3/uL    RBC Count 4.47 4.40 - 5.90 10e6/uL    Hemoglobin 9.2 (L) 13.3 - 17.7 g/dL    Hematocrit 30.1 (L) 40.0 - 53.0 %    MCV 67 (L) 78 - 100 fL    MCH 20.6 (L) 26.5 -  33.0 pg    MCHC 30.6 (L) 31.5 - 36.5 g/dL    RDW 18.8 (H) 10.0 - 15.0 %    Platelet Count 230 150 - 450 10e3/uL     BMP pending.      EKG: afib,, unchanged from previous tracings    Revised Cardiac Risk Index (RCRI):  The patient has the following serious cardiovascular risks for perioperative complications:   - Coronary Artery Disease (MI, positive stress test, angina, Qs on EKG) = 1 point   - Congestive Heart Failure (pulmonary edema, PND, s3 jyoti, CXR with pulmonary congestion, basilar rales) = 1 point     RCRI Interpretation: 2 points: Class III (moderate risk - 6.6% complication rate)     Estimated Functional Capacity: Performs 4 METS exercise without symptoms (e.g., light housework, stairs, 4 mph walk, 7 mph bike, slow step dance)            Signed Electronically by: Dany Rodriguez MD, MD  Copy of this evaluation report is provided to requesting physician.

## 2022-02-23 NOTE — LETTER
March 2, 2022      Shiraz Ingram  [address]  [facility] [address]        Dear ,    Your Covid test is negative.  Your hemoglobin is low but it is okay to proceed with surgery. Kidney function is not great but it is stable.       Resulted Orders   Asymptomatic COVID-19 Virus (Coronavirus) by PCR Nose   Result Value Ref Range    SARS CoV2 PCR Negative Negative, Testing sent to reference lab. Results will be returned via unsolicited result      Comment:      NEGATIVE: SARS-CoV-2 (COVID-19) RNA not detected, presumed negative.    Narrative    Testing was performed using the yuri SARS-CoV-2 assay on the yuri  Internal Gaming[employer] System. This test should be ordered for the detection of  SARS-CoV-2 in individuals who meet SARS-CoV-2 clinical and/or  epidemiological criteria. Test performance is unknown in asymptomatic  patients. This test is for in vitro diagnostic use under the FDA EUA  for laboratories certified under CLIA to perform high and/or moderate  complexity testing. This test has not been FDA cleared or approved. A  negative result does not rule out the presence of PCR inhibitors in  the specimen or target RNA in concentration below the limit of  detection for the assay. The possibility of a false negative should  be considered if the patient's recent exposure or clinical  presentation suggests COVID-19. This test was validated by the [facility]. This  laboratory is certified under the Clinical Laboratory Improvement  Amendments of 1988 (CLIA-88) as qualified to perform high and/or  moderate complexity laboratory testing.   CBC with platelets   Result Value Ref Range    WBC Count 3.9 (L) 4.0 - 11.0 10e3/uL    RBC Count 4.47 4.40 - 5.90 10e6/uL    Hemoglobin 9.2 (L) 13.3 - 17.7 g/dL    Hematocrit 30.1 (L) 40.0 - 53.0 %    MCV 67 (L) 78 - 100 fL    MCH 20.6 (L) 26.5 - 33.0 pg    MCHC 30.6 (L) 31.5 - 36.5 g/dL    RDW 18.8 (H) 10.0 - 15.0 %    Platelet Count 230  150 - 450 10e3/uL   Comprehensive metabolic panel (BMP + Alb, Alk Phos, ALT, AST, Total. Bili, TP)   Result Value Ref Range    Sodium 137 133 - 144 mmol/L    Potassium 5.4 (H) 3.4 - 5.3 mmol/L    Chloride 106 94 - 109 mmol/L    Carbon Dioxide (CO2) 23 20 - 32 mmol/L    Anion Gap 8 3 - 14 mmol/L    Urea Nitrogen 53 (H) 7 - 30 mg/dL    Creatinine 1.83 (H) 0.66 - 1.25 mg/dL    Calcium 9.4 8.5 - 10.1 mg/dL    Glucose 100 (H) 70 - 99 mg/dL    Alkaline Phosphatase 72 40 - 150 U/L    AST 16 0 - 45 U/L    ALT 22 0 - 70 U/L    Protein Total 7.3 6.8 - 8.8 g/dL    Albumin 3.5 3.4 - 5.0 g/dL    Bilirubin Total 0.7 0.2 - 1.3 mg/dL    GFR Estimate 35 (L) >60 mL/min/1.73m2      Comment:      Effective December 21, 2021 eGFRcr in adults is calculated using the 2021 CKD-EPI creatinine equation which includes age and gender (Shahnaz et al., NEJM, DOI: 10.1056/KYIDie5113174)       If you have any questions or concerns, please call the clinic at the number listed above.       Sincerely,      Dany Rodriguze MD/JOSE

## 2022-02-23 NOTE — PROGRESS NOTES
[facility] [address] [place] [address]  Phone: [phone]  Primary Provider: Nathan Rodriguez  Pre-op Performing Provider:    NATHAN RODRIGUEZ  [facility]       PREOPERATIVE EVALUATION:  Today's date: 2/23/2022    Shiraz Ingram is a 87 year old male who presents for a preoperative evaluation.    Surgical Information:  Surgery/Procedure: RIGHT REVERSE SHOULDER ARTHROPLASTY WITH NO CUSTOM GUIDE  Surgery Location: [facility]   Surgeon: Wiley Vargas MD  Surgery Date: 02/25/2022  Time of Surgery: 12:30pm  Where patient plans to recover: At home with family  Fax number for surgical facility: Note does not need to be faxed, will be available electronically in Epic.    Type of Anesthesia Anticipated: General    Assessment & Plan     The proposed surgical procedure is considered INTERMEDIATE risk.    Preop general physical exam     - EKG 12-lead complete w/read - Clinics  - HYDROcodone-acetaminophen (NORCO) 5-325 MG tablet; Take 1 tablet by mouth every 6 hours as needed for pain  - CBC with platelets; Future  - Comprehensive metabolic panel (BMP + Alb, Alk Phos, ALT, AST, Total. Bili, TP); Future  - Asymptomatic COVID-19 Virus (Coronavirus) by PCR Nose  - CBC with platelets  - Comprehensive metabolic panel (BMP + Alb, Alk Phos, ALT, AST, Total. Bili, TP)    Closed 4-part fracture of proximal humerus with delayed healing, right  Morphine - nausea. Due to poor renal function and surgery in less than 2 days, OK to use few norco. Signed rx. Side effects discussed.      Hypertension goal BP (blood pressure) < 140/90  bp usually on lower side but stick to same rx. bp stable today.     Stage 3b chronic kidney disease (H)  Can contribute adversely with anaesthesia. Monitor renal function. Obtain labs today.     Severe aortic stenosis -- S/P TAVR 12/2020  Recent TAVR. Doing OK.     CAD, S/P CABG in 2002  Seeing cardiology. Stable. On statin.   Also complete heart block  and s/p pacemaker.     Permanent atrial fibrillation (H)  Current EKG still shows afib. History of MAYA thrombus. On anticoagulant. Ideally should be seeing cardiology for cardiac clearance but his surgery is scheduled day after tomorrow. See pt instructions.     Chronic combined systolic and diastolic heart failure (H)  See above. Weight stable.  Ideally should be cleared by cardiology but due to limited time, OK to proceed as per patient instructions. Consider cardiology and hospitalist input during hospitalization.     Bilateral deafness  Needing .     Anemia of chronic disease (baseline Hgb 9.0-10.5)  Obtain labs today. History of GI bleed. Avoid nsaids.   Monitor post operatively.           Risks and Recommendations:  The patient has the following additional risks and recommendations for perioperative complications:   - Consult Hospitalist / IM to assist with post-op medical management    Medication Instructions:  Patient Instructions   Hold off on pradaxa (dabigatran) blood thinner until after surgery. Ideally it should be stopped atleast 2-3 days before surgery.     Ok to take rest of your medications as it is.    On the day of surgery in the morning - take only hydralazine, isosorbide mononitrate,metoprolol, torsemide with few sips of water. Skip rest of your morning pills.         Ideally pradaxa should be on hold for 2-3 days before surgery but he already took his morning dose, ok to hold for rest of the doses until after surgery. He understood. See med changes on the day of surgery.     RECOMMENDATION:  APPROVAL GIVEN to proceed with proposed procedure, without further diagnostic evaluation.    Review of external notes as documented above       Diagnosis or treatment significantly limited by social determinants of health - deafness, advanced age and chronic health conditions.     I spent a total of 65 minutes on the day of the visit.   Time spent doing chart review, history and  exam, documentation and further activities per the note        Subjective     HPI related to upcoming procedure: fell down and broke right shoulder.     1. Yes - Have you ever had a heart attack or stroke? Heart attack  2. Yes - Have you ever had surgery on your heart or blood vessels, such as a stent, coronary (heart) bypass, or surgery on an artery in the head, neck, heart, or legs? Triple bypass  3. No - Do you have chest pain when you are physically active?  4. No - Do you have a history of heart failure?  5. No - Do you currently have a cold, bronchitis, or symptoms of other respiratory (head and chest) infections?  6. No - Do you have a cough, shortness of breath, or wheezing?  7. No - Do you or anyone in your family have a history of blood clots?  8. No - Do you or anyone in your family have a serious bleeding problem, such as long-lasting bleeding after surgeries or cuts?  9. No - Have you ever had anemia or been told to take iron pills?  10. No - Have you had any abnormal blood loss such as black, tarry or bloody stools, or abnormal vaginal bleeding?  11. No - Have you ever had a blood transfusion?  12. Yes - Are you willing to have a blood transfusion if it is medically needed before, during, or after your surgery?  13. No - Have you or anyone in your family ever had problems with anesthesia (sedation for surgery)?  14. No - Do you have sleep apnea, excessive snoring, or daytime drowsiness?   15. Yes - Do you have any artifical heart valves or other implanted medical devices, such as a pacemaker, defibrillator, or continuous glucose monitor?   What type of device? pacemaker What is the name of the clinic that manages your device? [facility] cardiology  16. No - Do you have any artifical joints?  17. No - Are you allergic to latex?  18. No - Is there any chance that you may be pregnant?    Tylenol for pain. Pain is not under good control. 1000mg bid. Morphine does not work well - causes nausea.   Movement hurts  more.   Keeping it in sling.       Health Care Directive:  Patient does not have a Health Care Directive or Living Will: Discussed advance care planning with patient; information given to patient to review.    Preoperative Review of :   reviewed - controlled substances reflected in medication list.     afib - on anticoagulant. Asymtomatic. Chronic.   Complete heart block - has pacemaker.  Heart failure - seeing cardiologist. No change in medications or weight. Breathing is OK.   HTN - taking bp meds along with heart meds.   History of GI bleed and chronic anemia. On antacid.   Kidney function is not great. Not using nsaids.  Shoulder is painful and only using tylenol. Not helping much. Morphine was too much and developed nausea.     Review of Systems  Constitutional, neuro, ENT, endocrine, pulmonary, cardiac, gastrointestinal, genitourinary, musculoskeletal, integument and psychiatric systems are negative, except as otherwise noted.    Patient Active Problem List    Diagnosis Date Noted     Bilateral deafness 02/23/2022     Priority: Medium     Using        Thrombocytopenia, unspecified (H) 01/26/2022     Priority: Medium     Pain in joint involving ankle and foot, right 01/12/2022     Priority: Medium     Chronic combined systolic and diastolic heart failure (H) 12/08/2021     Priority: Medium     Hx of Esophageal ulcer -- Cauterized 5/24/20 03/16/2021     Priority: Medium     Anemia of chronic disease (baseline Hgb 9.0-10.5) 03/16/2021     Priority: Medium     Acute respiratory failure with hypoxia (H) 03/15/2021     Priority: Medium     Acute congestive heart failure, unspecified heart failure type (H) 10/27/2020     Priority: Medium     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 07/14/2020     Priority: Medium     Aortic stenosis, severe 07/14/2020     Priority: Medium     Persistent Afib -- on Pradaxa  07/13/2020     Priority: Medium     Thrombus of left atrial appendage 07/13/2020      Priority: Medium     Pneumonia 07/03/2020     Priority: Medium     GI bleed 05/24/2020     Priority: Medium     Hyperkalemia 05/11/2020     Priority: Medium     Acute respiratory failure (H) 04/23/2020     Priority: Medium     Acute on chronic congestive heart failure, unspecified heart failure type (H) 04/23/2020     Priority: Medium     Added automatically from request for surgery [id number]       Severe aortic stenosis -- S/P TAVR 12/2020 04/23/2020     Priority: Medium     Added automatically from request for surgery [id number]       Coronary artery disease involving native coronary artery of native heart without angina pectoris 04/23/2020     Priority: Medium     Added automatically from request for surgery [id number]       CAD, S/P CABG in 2002 04/23/2020     Priority: Medium     Added automatically from request for surgery [id number]       Health Care Home 02/25/2013     Priority: Medium     EMERGENCY CARE PLAN  Presenting Problem Signs and Symptoms Treatment Plan    Questions or conerns during clinic hours    I will call the clinic directly     Questions or conerns outside clinic hours    I will call the 24 hour nurse line at [phone]    Patient needs to schedule an appointment    I will call the 24 hour scheduling team at [phone] or clinic directly    Same day treatment     I will call the clinic first, nurse line if after hours, urgent care and express care if needed                            DX V65.8 REPLACED WITH [id number] HEALTH CARE HOME (04/08/2013)       Iron deficiency anemia 02/23/2013     Priority: Medium     Vitamin B 12 deficiency 02/23/2013     Priority: Medium     Advanced directives, counseling/discussion 02/20/2013     Priority: Medium     Pt stated upon admission that he has a HCD and on of his sons will bring to [facility].       CKD (chronic kidney disease) stage 3, GFR 30-59 ml/min (H) 05/12/2011     Priority: Medium     Hyperlipidemia with target LDL less than 100      Priority: Medium     Diagnosis  updated by automated process. Provider to review and confirm.       Hypertension goal BP (blood pressure) < 140/90      Priority: Medium     Osteoporosis 04/30/2007     Priority: Medium     Problem list name updated by automated process. Provider to review       Moderate persistent asthma 08/13/2005     Priority: Medium     Benign neoplasm of colon 06/10/2004     Priority: Medium     Ademonatous polyp       Localized osteoarthrosis, lower leg 06/10/2004     Priority: Medium     left knee  Problem list name updated by automated process. Provider to review       Allergic rhinitis 06/10/2004     Priority: Medium     Problem list name updated by automated process. Provider to review        Past Medical History:   Diagnosis Date     Allergic rhinitis, cause unspecified      Anemia, unspecified      Aortic stenosis S/P TAVR      Benign neoplasm of colon 1999    Ademonatous polyp     CHF 2nd to TAVR -- S/P Pacemaker 12/2020     CKD (chronic kidney disease) stage 3, GFR 30-59 ml/min (H) 05/12/2011     Community acquired pneumonia 9/29/2020     Coronary atherosclerosis of unspecified type of vessel, native or graft     s/p CABG 2002     Esophageal ulcer w UGI bleed 05/24/2020    Had EGD adn caurtery 5/24/20, colonoscopy with diverticulosis then     Hyperlipidemia LDL goal < 100      Hypertension goal BP (blood pressure) < 140/90      Localized osteoarthrosis not specified whether primary or secondary, lower leg     left knee     Mild persistent asthma without complication 1/11/2016     Moderate persistent asthma      Persistent atrial fibrillation (H)      Unspecified hearing loss      Past Surgical History:   Procedure Laterality Date     CARDIAC SURGERY       COLONOSCOPY N/A 5/26/2020    Procedure: COLONOSCOPY;  Surgeon: Ignacio Fournier MD;  Location:  GI     CV ANGIOGRAM CORONARY GRAFT N/A 4/24/2020    Procedure: Angiogram Coronary Graft;  Surgeon: Addison Willard MD;  Location:  HEART CARDIAC CATH LAB      CV CORONARY ANGIOGRAM N/A 4/24/2020    Procedure: Coronary Angiogram;  Surgeon: Addison Willard MD;  Location:  HEART CARDIAC CATH LAB     CV RIGHT HEART CATH MEASUREMENTS RECORDED N/A 4/24/2020    Procedure: Right Heart Cath;  Surgeon: Addison Willard MD;  Location:  HEART CARDIAC CATH LAB     CV TRANSCATHETER AORTIC VALVE REPLACEMENT N/A 7/14/2020    Procedure: Transcatheter Aortic Valve Replacement;  Surgeon: Soraida Monet MD;  Location:  HEART CARDIAC CATH LAB     EP PACEMAKER N/A 7/15/2020    Procedure: EP Pacemaker;  Surgeon: Tommie Sauer MD;  Location:  HEART CARDIAC CATH LAB     HC ESOPHAGOSCOPY, DIAGNOSTIC  5/03    Dr. Dow, lower esophageal ring & mild gastritis     HERNIORRHAPHY INGUINAL Right 9/26/2016    Procedure: HERNIORRHAPHY INGUINAL;  Surgeon: Alexis Alexandra MD;  Location: [facility]     LAPAROSCOPIC CHOLECYSTECTOMY  12/03    for biliary sludge     ZZC NONSPECIFIC PROCEDURE  5/02    Coronary artery bypass     [facility]/CAUTERY TUMOR/POLYP/LESION  5/03    Dr. Dow, Q 5 years     ZZ COLONOSCOPY THRU STOMA W BIOPSY/CAUTERY TUMOR/POLYP/LESION  12/199    Dr. Dow, one adenomatous polyp     Current Outpatient Medications   Medication Sig Dispense Refill     albuterol (PROAIR HFA/PROVENTIL HFA/VENTOLIN HFA) 108 (90 Base) MCG/ACT inhaler INHALE 1 TO 2 PUFFS BY MOUTH EVERY 4 TO 6 HOURS AS NEEDED 18 g 2     aspirin (ASA) 81 MG EC tablet Take 81 mg by mouth daily       budesonide (PULMICORT) 0.5 MG/2ML nebulizer solution Take 2 mLs (0.5 mg) by nebulization 2 times daily 120 mL 0     calcium carbonate 600 mg-vitamin D 400 units (CALTRATE) 600-400 MG-UNIT per tablet Take 1 tablet by mouth daily       dabigatran ANTICOAGULANT (PRADAXA) 150 MG capsule Take 1 capsule (150 mg) by mouth 2 times daily 180 capsule 3     ferrous sulfate (IRON) 325 (65 Fe) MG tablet TAKE 1 TABLET(325 MG) BY MOUTH TWICE DAILY 180 tablet 0     hydrALAZINE  (APRESOLINE) 25 MG tablet Take 0.5 tablets (12.5 mg) by mouth 3 times daily 135 tablet 3     HYDROcodone-acetaminophen (NORCO) 5-325 MG tablet Take 1 tablet by mouth every 6 hours as needed for pain 10 tablet 0     ipratropium - albuterol 0.5 mg/2.5 mg/3 mL (DUONEB) 0.5-2.5 (3) MG/3ML nebulization Inhale 1 vial (3 mLs) into the lungs 4 times daily 360 mL 11     isosorbide mononitrate (IMDUR) 30 MG 24 hr tablet Take 1 tablet (30 mg) by mouth daily 90 tablet 1     magnesium chloride (MAG64) 535 (64 Mg) MG TBEC CR tablet Take 1 tablet (535 mg) by mouth 2 times daily 180 tablet 3     meclizine (ANTIVERT) 12.5 MG tablet Take 1 tablet (12.5 mg) by mouth 3 times daily as needed for dizziness 90 tablet 1     melatonin 3 MG tablet Take 1 tablet (3 mg) by mouth nightly as needed for sleep 30 tablet 0     metoprolol tartrate (LOPRESSOR) 25 MG tablet TAKE 1 TABLET BY MOUTH TWICE A  tablet 1     montelukast (SINGULAIR) 10 MG tablet TAKE 1 TABLET BY MOUTH EVERY DAY 90 tablet 1     multivitamin, therapeutic (THERA-VIT) TABS tablet Take 1 tablet by mouth every evening        nitroGLYcerin (NITROSTAT) 0.4 MG sublingual tablet For chest pain place 1 tablet under the tongue every 5 minutes for 3 doses. If symptoms persist 5 minutes after 1st dose call 911. 12 tablet 0     pantoprazole (PROTONIX) 40 MG EC tablet TAKE 1 TABLET BEFORE MEALS TWICE A  tablet 1     simvastatin (ZOCOR) 40 MG tablet Take 1 tablet (40 mg) by mouth At Bedtime 90 tablet 3     torsemide (DEMADEX) 5 MG tablet Take 2 tablets (10 mg) by mouth daily 180 tablet 3       No Known Allergies     Social History     Tobacco Use     Smoking status: Never Smoker     Smokeless tobacco: Never Used     Tobacco comment: smoked for a week in 's   Substance Use Topics     Alcohol use: Not Currently     Alcohol/week: 0.0 standard drinks     Family History   Problem Relation Age of Onset     C.A.D. Father      Cancer Mother         breast cancer,  of pneumonia      Cerebrovascular Disease Son      CABG Son      Family History Negative Child      Family History Negative Sister      Heart Disease Brother      History   Drug Use No         Objective     /78 (BP Location: Left arm, Patient Position: Sitting, Cuff Size: Adult Regular)   Pulse 60   Temp 99.1  F (37.3  C) (Temporal)   Resp 16   Wt 72.6 kg (160 lb)   SpO2 97%   BMI 25.06 kg/m      Physical Exam    GENERAL APPEARANCE: healthy, alert and no distress     EYES: EOMI,  PERRL     HENT: ear canals and TM's normal and nose and mouth without ulcers or lesions     NECK: no adenopathy, no asymmetry, masses, or scars and thyroid normal to palpation     RESP: lungs clear to auscultation - no rales, rhonchi or wheezes     CV: palpable pacemaker, heart rate minimally irregular rhythm.      MS: right shoulder in sling. Mild bruising of fingers and minimal swelling.      SKIN: no suspicious lesions or rashes     NEURO: Normal strength and tone, sensory exam grossly normal, mentation intact and speech normal     PSYCH: mentation appears normal. and affect normal/bright     LYMPHATICS: No cervical adenopathy    Recent Labs   Lab Test 01/12/22  1028 12/08/21  1102 06/07/21  0956 03/22/21  0956 03/16/21  0542 03/15/21  1254 07/14/20  1147 07/13/20  1420   HGB 10.2*  --  10.3* 9.0*   < > 10.6*   < > 11.5*   PLT  --   --  116* 227   < > 136*   < > 168   INR  --   --   --   --   --  1.51*  --  1.21*    139 136 136   < > 137   < > 142   POTASSIUM 4.9 4.8 4.3 5.0   < > 4.4   < > 4.2   CR 2.01* 1.81* 1.72* 1.65*   < > 1.83*   < > 1.40*    < > = values in this interval not displayed.        Diagnostics:  Recent Results (from the past 24 hour(s))   CBC with platelets    Collection Time: 02/23/22  2:12 PM   Result Value Ref Range    WBC Count 3.9 (L) 4.0 - 11.0 10e3/uL    RBC Count 4.47 4.40 - 5.90 10e6/uL    Hemoglobin 9.2 (L) 13.3 - 17.7 g/dL    Hematocrit 30.1 (L) 40.0 - 53.0 %    MCV 67 (L) 78 - 100 fL    MCH 20.6 (L) 26.5 -  33.0 pg    MCHC 30.6 (L) 31.5 - 36.5 g/dL    RDW 18.8 (H) 10.0 - 15.0 %    Platelet Count 230 150 - 450 10e3/uL     BMP pending.      EKG: afib,, unchanged from previous tracings    Revised Cardiac Risk Index (RCRI):  The patient has the following serious cardiovascular risks for perioperative complications:   - Coronary Artery Disease (MI, positive stress test, angina, Qs on EKG) = 1 point   - Congestive Heart Failure (pulmonary edema, PND, s3 jyoti, CXR with pulmonary congestion, basilar rales) = 1 point     RCRI Interpretation: 2 points: Class III (moderate risk - 6.6% complication rate)     Estimated Functional Capacity: Performs 4 METS exercise without symptoms (e.g., light housework, stairs, 4 mph walk, 7 mph bike, slow step dance)            Signed Electronically by: Dany Rodriguez MD, MD  Copy of this evaluation report is provided to requesting physician.

## 2022-02-23 NOTE — PATIENT INSTRUCTIONS
Hold off on pradaxa (dabigatran) blood thinner until after surgery. Ideally it should be stopped atleast 2-3 days before surgery.     Ok to take rest of your medications as it is.    On the day of surgery in the morning - take only hydralazine, isosorbide mononitrate,metoprolol, torsemide with few sips of water. Skip rest of your morning pills.

## 2022-02-24 LAB
ALBUMIN SERPL-MCNC: 3.5 G/DL (ref 3.4–5)
ALP SERPL-CCNC: 72 U/L (ref 40–150)
ALT SERPL W P-5'-P-CCNC: 22 U/L (ref 0–70)
ANION GAP SERPL CALCULATED.3IONS-SCNC: 8 MMOL/L (ref 3–14)
AST SERPL W P-5'-P-CCNC: 16 U/L (ref 0–45)
BILIRUB SERPL-MCNC: 0.7 MG/DL (ref 0.2–1.3)
BUN SERPL-MCNC: 53 MG/DL (ref 7–30)
CALCIUM SERPL-MCNC: 9.4 MG/DL (ref 8.5–10.1)
CHLORIDE BLD-SCNC: 106 MMOL/L (ref 94–109)
CO2 SERPL-SCNC: 23 MMOL/L (ref 20–32)
CREAT SERPL-MCNC: 1.83 MG/DL (ref 0.66–1.25)
GFR SERPL CREATININE-BSD FRML MDRD: 35 ML/MIN/1.73M2
GLUCOSE BLD-MCNC: 100 MG/DL (ref 70–99)
POTASSIUM BLD-SCNC: 5.4 MMOL/L (ref 3.4–5.3)
PROT SERPL-MCNC: 7.3 G/DL (ref 6.8–8.8)
SARS-COV-2 RNA RESP QL NAA+PROBE: NEGATIVE
SODIUM SERPL-SCNC: 137 MMOL/L (ref 133–144)

## 2022-02-25 ENCOUNTER — HOSPITAL ENCOUNTER (OUTPATIENT)
Facility: CLINIC | Age: 87
Discharge: HOME | End: 2022-02-26
Attending: ORTHOPAEDIC SURGERY | Admitting: ORTHOPAEDIC SURGERY
Payer: COMMERCIAL

## 2022-02-25 ENCOUNTER — ANESTHESIA EVENT (OUTPATIENT)
Dept: SURGERY | Facility: CLINIC | Age: 87
End: 2022-02-25
Payer: COMMERCIAL

## 2022-02-25 ENCOUNTER — APPOINTMENT (OUTPATIENT)
Dept: GENERAL RADIOLOGY | Facility: CLINIC | Age: 87
End: 2022-02-25
Attending: STUDENT IN AN ORGANIZED HEALTH CARE EDUCATION/TRAINING PROGRAM
Payer: COMMERCIAL

## 2022-02-25 ENCOUNTER — ANESTHESIA (OUTPATIENT)
Dept: SURGERY | Facility: CLINIC | Age: 87
End: 2022-02-25
Payer: COMMERCIAL

## 2022-02-25 ENCOUNTER — APPOINTMENT (OUTPATIENT)
Dept: GENERAL RADIOLOGY | Facility: CLINIC | Age: 87
End: 2022-02-25
Attending: ORTHOPAEDIC SURGERY
Payer: COMMERCIAL

## 2022-02-25 DIAGNOSIS — Z98.890 POSTOPERATIVE STATE: Primary | ICD-10-CM

## 2022-02-25 LAB
ABO/RH(D): NORMAL
ANTIBODY SCREEN: NEGATIVE
BLD PROD TYP BPU: NORMAL
BLOOD COMPONENT TYPE: NORMAL
CODING SYSTEM: NORMAL
CREAT SERPL-MCNC: 1.66 MG/DL (ref 0.66–1.25)
CROSSMATCH: NORMAL
ERYTHROCYTE [DISTWIDTH] IN BLOOD BY AUTOMATED COUNT: 18.3 % (ref 10–15)
GFR SERPL CREATININE-BSD FRML MDRD: 40 ML/MIN/1.73M2
HCT VFR BLD AUTO: 26.3 % (ref 40–53)
HGB BLD-MCNC: 8 G/DL (ref 13.3–17.7)
ISSUE DATE AND TIME: NORMAL
MCH RBC QN AUTO: 20.7 PG (ref 26.5–33)
MCHC RBC AUTO-ENTMCNC: 30.4 G/DL (ref 31.5–36.5)
MCV RBC AUTO: 68 FL (ref 78–100)
PLATELET # BLD AUTO: 182 10E3/UL (ref 150–450)
POTASSIUM BLD-SCNC: 4.7 MMOL/L (ref 3.4–5.3)
RBC # BLD AUTO: 3.86 10E6/UL (ref 4.4–5.9)
SPECIMEN EXPIRATION DATE: NORMAL
UNIT ABO/RH: NORMAL
UNIT NUMBER: NORMAL
UNIT STATUS: NORMAL
UNIT TYPE ISBT: 6200
WBC # BLD AUTO: 3 10E3/UL (ref 4–11)

## 2022-02-25 PROCEDURE — 82565 ASSAY OF CREATININE: CPT | Performed by: ANESTHESIOLOGY

## 2022-02-25 PROCEDURE — 250N000011 HC RX IP 250 OP 636: Performed by: PHYSICIAN ASSISTANT

## 2022-02-25 PROCEDURE — 250N000013 HC RX MED GY IP 250 OP 250 PS 637: Performed by: STUDENT IN AN ORGANIZED HEALTH CARE EDUCATION/TRAINING PROGRAM

## 2022-02-25 PROCEDURE — 250N000009 HC RX 250: Performed by: NURSE ANESTHETIST, CERTIFIED REGISTERED

## 2022-02-25 PROCEDURE — C1713 ANCHOR/SCREW BN/BN,TIS/BN: HCPCS | Performed by: ORTHOPAEDIC SURGERY

## 2022-02-25 PROCEDURE — 250N000009 HC RX 250: Performed by: ANESTHESIOLOGY

## 2022-02-25 PROCEDURE — 710N000009 HC RECOVERY PHASE 1, LEVEL 1, PER MIN: Performed by: ORTHOPAEDIC SURGERY

## 2022-02-25 PROCEDURE — 99222 1ST HOSP IP/OBS MODERATE 55: CPT | Performed by: INTERNAL MEDICINE

## 2022-02-25 PROCEDURE — 370N000017 HC ANESTHESIA TECHNICAL FEE, PER MIN: Performed by: ORTHOPAEDIC SURGERY

## 2022-02-25 PROCEDURE — 250N000009 HC RX 250: Performed by: ORTHOPAEDIC SURGERY

## 2022-02-25 PROCEDURE — 278N000051 HC OR IMPLANT GENERAL: Performed by: ORTHOPAEDIC SURGERY

## 2022-02-25 PROCEDURE — P9016 RBC LEUKOCYTES REDUCED: HCPCS | Performed by: ORTHOPAEDIC SURGERY

## 2022-02-25 PROCEDURE — C1776 JOINT DEVICE (IMPLANTABLE): HCPCS | Performed by: ORTHOPAEDIC SURGERY

## 2022-02-25 PROCEDURE — 250N000011 HC RX IP 250 OP 636: Performed by: STUDENT IN AN ORGANIZED HEALTH CARE EDUCATION/TRAINING PROGRAM

## 2022-02-25 PROCEDURE — 250N000011 HC RX IP 250 OP 636: Performed by: ANESTHESIOLOGY

## 2022-02-25 PROCEDURE — 258N000003 HC RX IP 258 OP 636: Performed by: NURSE ANESTHETIST, CERTIFIED REGISTERED

## 2022-02-25 PROCEDURE — 272N000001 HC OR GENERAL SUPPLY STERILE: Performed by: ORTHOPAEDIC SURGERY

## 2022-02-25 PROCEDURE — 999N000141 HC STATISTIC PRE-PROCEDURE NURSING ASSESSMENT: Performed by: ORTHOPAEDIC SURGERY

## 2022-02-25 PROCEDURE — 84132 ASSAY OF SERUM POTASSIUM: CPT | Performed by: ANESTHESIOLOGY

## 2022-02-25 PROCEDURE — 258N000003 HC RX IP 258 OP 636: Performed by: ANESTHESIOLOGY

## 2022-02-25 PROCEDURE — 250N000025 HC SEVOFLURANE, PER MIN: Performed by: ORTHOPAEDIC SURGERY

## 2022-02-25 PROCEDURE — 250N000011 HC RX IP 250 OP 636: Performed by: NURSE ANESTHETIST, CERTIFIED REGISTERED

## 2022-02-25 PROCEDURE — 250N000013 HC RX MED GY IP 250 OP 250 PS 637: Performed by: PHYSICIAN ASSISTANT

## 2022-02-25 PROCEDURE — 86923 COMPATIBILITY TEST ELECTRIC: CPT | Performed by: ORTHOPAEDIC SURGERY

## 2022-02-25 PROCEDURE — 86850 RBC ANTIBODY SCREEN: CPT | Performed by: ANESTHESIOLOGY

## 2022-02-25 PROCEDURE — 250N000011 HC RX IP 250 OP 636: Performed by: ORTHOPAEDIC SURGERY

## 2022-02-25 PROCEDURE — 999N000063 XR SHOULDER RIGHT PORT G/E 2 VIEWS: Mod: RT

## 2022-02-25 PROCEDURE — 85027 COMPLETE CBC AUTOMATED: CPT | Performed by: ORTHOPAEDIC SURGERY

## 2022-02-25 PROCEDURE — 99207 PR CONSULT E&M CHANGED TO INITIAL LEVEL: CPT | Performed by: PHYSICIAN ASSISTANT

## 2022-02-25 PROCEDURE — 99219 PR INITIAL OBSERVATION CARE,LEVEL II: CPT | Performed by: PHYSICIAN ASSISTANT

## 2022-02-25 PROCEDURE — 36415 COLL VENOUS BLD VENIPUNCTURE: CPT | Performed by: ANESTHESIOLOGY

## 2022-02-25 PROCEDURE — 360N000084 HC SURGERY LEVEL 4 W/ FLUORO, PER MIN: Performed by: ORTHOPAEDIC SURGERY

## 2022-02-25 PROCEDURE — 999N000179 XR SURGERY CARM FLUORO LESS THAN 5 MIN W STILLS

## 2022-02-25 DEVICE — IMPLANTABLE DEVICE: Type: IMPLANTABLE DEVICE | Site: SHOULDER | Status: FUNCTIONAL

## 2022-02-25 DEVICE — SCREW PERIPHERAL 5.0X34MM DWJ334: Type: IMPLANTABLE DEVICE | Site: SHOULDER | Status: FUNCTIONAL

## 2022-02-25 DEVICE — IMPLANTABLE DEVICE
Type: IMPLANTABLE DEVICE | Site: SHOULDER | Status: FUNCTIONAL
Brand: FLEX SHOULDER SYSTEM

## 2022-02-25 DEVICE — SCREW PERIPHERAL 5.0X30MM DWJ330: Type: IMPLANTABLE DEVICE | Site: SHOULDER | Status: FUNCTIONAL

## 2022-02-25 DEVICE — SCREW PERIPHERAL 22MM: Type: IMPLANTABLE DEVICE | Site: SHOULDER | Status: FUNCTIONAL

## 2022-02-25 DEVICE — SCREW BSPLT 35MM 6.5MM AQLS PRFRM GLND RVRS CNTR NS DWJ135: Type: IMPLANTABLE DEVICE | Site: SHOULDER | Status: FUNCTIONAL

## 2022-02-25 DEVICE — IMPLANTABLE DEVICE
Type: IMPLANTABLE DEVICE | Site: SHOULDER | Status: FUNCTIONAL
Brand: TORNIER FLEX SHOULDER SYSTEM

## 2022-02-25 DEVICE — IMPLANTABLE DEVICE
Type: IMPLANTABLE DEVICE | Site: SHOULDER | Status: FUNCTIONAL
Brand: AEQUALIS™ PERFORM+ REVERSED

## 2022-02-25 DEVICE — SPEEDSET FULL DOSE ANTIBIOTIC BONE CEMENT, 10 PACK CATALOG NUMBER IS 6192-1-010
Type: IMPLANTABLE DEVICE | Site: SHOULDER | Status: FUNCTIONAL
Brand: SIMPLEX

## 2022-02-25 RX ORDER — METOPROLOL TARTRATE 25 MG/1
25 TABLET, FILM COATED ORAL 2 TIMES DAILY
Status: DISCONTINUED | OUTPATIENT
Start: 2022-02-25 | End: 2022-02-26 | Stop reason: HOSPADM

## 2022-02-25 RX ORDER — FERROUS SULFATE 325(65) MG
325 TABLET ORAL 2 TIMES DAILY WITH MEALS
Status: DISCONTINUED | OUTPATIENT
Start: 2022-02-25 | End: 2022-02-26 | Stop reason: HOSPADM

## 2022-02-25 RX ORDER — NALOXONE HYDROCHLORIDE 0.4 MG/ML
0.4 INJECTION, SOLUTION INTRAMUSCULAR; INTRAVENOUS; SUBCUTANEOUS
Status: DISCONTINUED | OUTPATIENT
Start: 2022-02-25 | End: 2022-02-26 | Stop reason: HOSPADM

## 2022-02-25 RX ORDER — DEXAMETHASONE SODIUM PHOSPHATE 4 MG/ML
INJECTION, SOLUTION INTRA-ARTICULAR; INTRALESIONAL; INTRAMUSCULAR; INTRAVENOUS; SOFT TISSUE PRN
Status: DISCONTINUED | OUTPATIENT
Start: 2022-02-25 | End: 2022-02-25

## 2022-02-25 RX ORDER — ACETAMINOPHEN 325 MG/1
975 TABLET ORAL ONCE
Status: COMPLETED | OUTPATIENT
Start: 2022-02-25 | End: 2022-02-25

## 2022-02-25 RX ORDER — SODIUM CHLORIDE, SODIUM LACTATE, POTASSIUM CHLORIDE, CALCIUM CHLORIDE 600; 310; 30; 20 MG/100ML; MG/100ML; MG/100ML; MG/100ML
INJECTION, SOLUTION INTRAVENOUS CONTINUOUS
Status: DISCONTINUED | OUTPATIENT
Start: 2022-02-25 | End: 2022-02-25 | Stop reason: HOSPADM

## 2022-02-25 RX ORDER — ISOSORBIDE MONONITRATE 30 MG/1
30 TABLET, EXTENDED RELEASE ORAL DAILY
Status: DISCONTINUED | OUTPATIENT
Start: 2022-02-26 | End: 2022-02-26 | Stop reason: HOSPADM

## 2022-02-25 RX ORDER — VANCOMYCIN HYDROCHLORIDE 1 G/20ML
INJECTION, POWDER, LYOPHILIZED, FOR SOLUTION INTRAVENOUS PRN
Status: DISCONTINUED | OUTPATIENT
Start: 2022-02-25 | End: 2022-02-25 | Stop reason: HOSPADM

## 2022-02-25 RX ORDER — ONDANSETRON 4 MG/1
4 TABLET, ORALLY DISINTEGRATING ORAL EVERY 6 HOURS PRN
Status: DISCONTINUED | OUTPATIENT
Start: 2022-02-25 | End: 2022-02-26 | Stop reason: HOSPADM

## 2022-02-25 RX ORDER — ALBUTEROL SULFATE 0.83 MG/ML
2.5 SOLUTION RESPIRATORY (INHALATION) EVERY 4 HOURS PRN
Status: DISCONTINUED | OUTPATIENT
Start: 2022-02-25 | End: 2022-02-25 | Stop reason: HOSPADM

## 2022-02-25 RX ORDER — ONDANSETRON 2 MG/ML
INJECTION INTRAMUSCULAR; INTRAVENOUS PRN
Status: DISCONTINUED | OUTPATIENT
Start: 2022-02-25 | End: 2022-02-25

## 2022-02-25 RX ORDER — OXYCODONE HYDROCHLORIDE 5 MG/1
10 TABLET ORAL EVERY 4 HOURS PRN
Status: DISCONTINUED | OUTPATIENT
Start: 2022-02-25 | End: 2022-02-26

## 2022-02-25 RX ORDER — LIDOCAINE HYDROCHLORIDE 20 MG/ML
INJECTION, SOLUTION INFILTRATION; PERINEURAL PRN
Status: DISCONTINUED | OUTPATIENT
Start: 2022-02-25 | End: 2022-02-25

## 2022-02-25 RX ORDER — ONDANSETRON 2 MG/ML
4 INJECTION INTRAMUSCULAR; INTRAVENOUS
Status: DISCONTINUED | OUTPATIENT
Start: 2022-02-25 | End: 2022-02-25 | Stop reason: HOSPADM

## 2022-02-25 RX ORDER — ONDANSETRON 2 MG/ML
4 INJECTION INTRAMUSCULAR; INTRAVENOUS EVERY 6 HOURS PRN
Status: DISCONTINUED | OUTPATIENT
Start: 2022-02-25 | End: 2022-02-26 | Stop reason: HOSPADM

## 2022-02-25 RX ORDER — ACETAMINOPHEN 325 MG/1
975 TABLET ORAL EVERY 8 HOURS
Status: DISCONTINUED | OUTPATIENT
Start: 2022-02-25 | End: 2022-02-26 | Stop reason: HOSPADM

## 2022-02-25 RX ORDER — MONTELUKAST SODIUM 10 MG/1
10 TABLET ORAL AT BEDTIME
Status: DISCONTINUED | OUTPATIENT
Start: 2022-02-25 | End: 2022-02-26 | Stop reason: HOSPADM

## 2022-02-25 RX ORDER — ACETAMINOPHEN 500 MG
1000 TABLET ORAL EVERY 8 HOURS PRN
COMMUNITY

## 2022-02-25 RX ORDER — BUDESONIDE 0.5 MG/2ML
0.5 INHALANT ORAL 2 TIMES DAILY
Status: DISCONTINUED | OUTPATIENT
Start: 2022-02-25 | End: 2022-02-26

## 2022-02-25 RX ORDER — ONDANSETRON 2 MG/ML
4 INJECTION INTRAMUSCULAR; INTRAVENOUS EVERY 30 MIN PRN
Status: DISCONTINUED | OUTPATIENT
Start: 2022-02-25 | End: 2022-02-25 | Stop reason: HOSPADM

## 2022-02-25 RX ORDER — GLYCOPYRROLATE 0.2 MG/ML
INJECTION, SOLUTION INTRAMUSCULAR; INTRAVENOUS PRN
Status: DISCONTINUED | OUTPATIENT
Start: 2022-02-25 | End: 2022-02-25

## 2022-02-25 RX ORDER — LIDOCAINE 40 MG/G
CREAM TOPICAL
Status: DISCONTINUED | OUTPATIENT
Start: 2022-02-25 | End: 2022-02-26 | Stop reason: HOSPADM

## 2022-02-25 RX ORDER — SIMVASTATIN 40 MG
40 TABLET ORAL AT BEDTIME
Status: DISCONTINUED | OUTPATIENT
Start: 2022-02-25 | End: 2022-02-26 | Stop reason: HOSPADM

## 2022-02-25 RX ORDER — AMOXICILLIN 250 MG
1 CAPSULE ORAL 2 TIMES DAILY
Status: DISCONTINUED | OUTPATIENT
Start: 2022-02-25 | End: 2022-02-26 | Stop reason: HOSPADM

## 2022-02-25 RX ORDER — PROCHLORPERAZINE MALEATE 5 MG
5 TABLET ORAL EVERY 6 HOURS PRN
Status: DISCONTINUED | OUTPATIENT
Start: 2022-02-25 | End: 2022-02-26 | Stop reason: HOSPADM

## 2022-02-25 RX ORDER — NALOXONE HYDROCHLORIDE 0.4 MG/ML
0.2 INJECTION, SOLUTION INTRAMUSCULAR; INTRAVENOUS; SUBCUTANEOUS
Status: DISCONTINUED | OUTPATIENT
Start: 2022-02-25 | End: 2022-02-26 | Stop reason: HOSPADM

## 2022-02-25 RX ORDER — PANTOPRAZOLE SODIUM 40 MG/1
40 TABLET, DELAYED RELEASE ORAL
Status: DISCONTINUED | OUTPATIENT
Start: 2022-02-25 | End: 2022-02-26 | Stop reason: HOSPADM

## 2022-02-25 RX ORDER — EPHEDRINE SULFATE 50 MG/ML
INJECTION, SOLUTION INTRAMUSCULAR; INTRAVENOUS; SUBCUTANEOUS PRN
Status: DISCONTINUED | OUTPATIENT
Start: 2022-02-25 | End: 2022-02-25

## 2022-02-25 RX ORDER — ONDANSETRON 4 MG/1
4 TABLET, ORALLY DISINTEGRATING ORAL EVERY 30 MIN PRN
Status: DISCONTINUED | OUTPATIENT
Start: 2022-02-25 | End: 2022-02-25 | Stop reason: HOSPADM

## 2022-02-25 RX ORDER — FENTANYL CITRATE 50 UG/ML
INJECTION, SOLUTION INTRAMUSCULAR; INTRAVENOUS PRN
Status: DISCONTINUED | OUTPATIENT
Start: 2022-02-25 | End: 2022-02-25

## 2022-02-25 RX ORDER — CEFAZOLIN SODIUM/WATER 2 G/20 ML
2 SYRINGE (ML) INTRAVENOUS
Status: COMPLETED | OUTPATIENT
Start: 2022-02-25 | End: 2022-02-25

## 2022-02-25 RX ORDER — ALBUTEROL SULFATE 90 UG/1
1-2 AEROSOL, METERED RESPIRATORY (INHALATION) EVERY 4 HOURS PRN
Status: DISCONTINUED | OUTPATIENT
Start: 2022-02-25 | End: 2022-02-26 | Stop reason: HOSPADM

## 2022-02-25 RX ORDER — NEOSTIGMINE METHYLSULFATE 1 MG/ML
VIAL (ML) INJECTION PRN
Status: DISCONTINUED | OUTPATIENT
Start: 2022-02-25 | End: 2022-02-25

## 2022-02-25 RX ORDER — OXYCODONE HYDROCHLORIDE 5 MG/1
5 TABLET ORAL EVERY 4 HOURS PRN
Status: DISCONTINUED | OUTPATIENT
Start: 2022-02-25 | End: 2022-02-25 | Stop reason: HOSPADM

## 2022-02-25 RX ORDER — HYDROXYZINE HYDROCHLORIDE 10 MG/1
10 TABLET, FILM COATED ORAL EVERY 6 HOURS PRN
Status: DISCONTINUED | OUTPATIENT
Start: 2022-02-25 | End: 2022-02-26 | Stop reason: HOSPADM

## 2022-02-25 RX ORDER — TORSEMIDE 10 MG/1
10 TABLET ORAL DAILY
Status: DISCONTINUED | OUTPATIENT
Start: 2022-02-26 | End: 2022-02-26 | Stop reason: HOSPADM

## 2022-02-25 RX ORDER — OXYCODONE HYDROCHLORIDE 5 MG/1
5 TABLET ORAL EVERY 4 HOURS PRN
Status: DISCONTINUED | OUTPATIENT
Start: 2022-02-25 | End: 2022-02-26

## 2022-02-25 RX ORDER — FENTANYL CITRATE 50 UG/ML
50 INJECTION, SOLUTION INTRAMUSCULAR; INTRAVENOUS EVERY 5 MIN PRN
Status: DISCONTINUED | OUTPATIENT
Start: 2022-02-25 | End: 2022-02-25 | Stop reason: HOSPADM

## 2022-02-25 RX ORDER — FENTANYL CITRATE 0.05 MG/ML
50 INJECTION, SOLUTION INTRAMUSCULAR; INTRAVENOUS
Status: DISCONTINUED | OUTPATIENT
Start: 2022-02-25 | End: 2022-02-25 | Stop reason: HOSPADM

## 2022-02-25 RX ORDER — ETOMIDATE 2 MG/ML
INJECTION INTRAVENOUS PRN
Status: DISCONTINUED | OUTPATIENT
Start: 2022-02-25 | End: 2022-02-25

## 2022-02-25 RX ORDER — MEPERIDINE HYDROCHLORIDE 25 MG/ML
12.5 INJECTION INTRAMUSCULAR; INTRAVENOUS; SUBCUTANEOUS EVERY 5 MIN PRN
Status: DISCONTINUED | OUTPATIENT
Start: 2022-02-25 | End: 2022-02-25 | Stop reason: HOSPADM

## 2022-02-25 RX ORDER — SODIUM CHLORIDE, SODIUM LACTATE, POTASSIUM CHLORIDE, CALCIUM CHLORIDE 600; 310; 30; 20 MG/100ML; MG/100ML; MG/100ML; MG/100ML
INJECTION, SOLUTION INTRAVENOUS CONTINUOUS
Status: DISCONTINUED | OUTPATIENT
Start: 2022-02-25 | End: 2022-02-26 | Stop reason: HOSPADM

## 2022-02-25 RX ORDER — HYDROMORPHONE HCL IN WATER/PF 6 MG/30 ML
0.4 PATIENT CONTROLLED ANALGESIA SYRINGE INTRAVENOUS
Status: DISCONTINUED | OUTPATIENT
Start: 2022-02-25 | End: 2022-02-26 | Stop reason: HOSPADM

## 2022-02-25 RX ORDER — ASPIRIN 81 MG/1
81 TABLET ORAL 2 TIMES DAILY
Status: DISCONTINUED | OUTPATIENT
Start: 2022-02-25 | End: 2022-02-26

## 2022-02-25 RX ORDER — PROPOFOL 10 MG/ML
INJECTION, EMULSION INTRAVENOUS CONTINUOUS PRN
Status: DISCONTINUED | OUTPATIENT
Start: 2022-02-25 | End: 2022-02-25

## 2022-02-25 RX ORDER — HYDROMORPHONE HCL IN WATER/PF 6 MG/30 ML
0.2 PATIENT CONTROLLED ANALGESIA SYRINGE INTRAVENOUS
Status: DISCONTINUED | OUTPATIENT
Start: 2022-02-25 | End: 2022-02-26 | Stop reason: HOSPADM

## 2022-02-25 RX ORDER — HYDROMORPHONE HCL IN WATER/PF 6 MG/30 ML
0.4 PATIENT CONTROLLED ANALGESIA SYRINGE INTRAVENOUS EVERY 5 MIN PRN
Status: DISCONTINUED | OUTPATIENT
Start: 2022-02-25 | End: 2022-02-25 | Stop reason: HOSPADM

## 2022-02-25 RX ORDER — TRANEXAMIC ACID 650 MG/1
1950 TABLET ORAL ONCE
Status: COMPLETED | OUTPATIENT
Start: 2022-02-25 | End: 2022-02-25

## 2022-02-25 RX ORDER — CEFAZOLIN SODIUM 1 G/3ML
1 INJECTION, POWDER, FOR SOLUTION INTRAMUSCULAR; INTRAVENOUS EVERY 8 HOURS
Status: COMPLETED | OUTPATIENT
Start: 2022-02-25 | End: 2022-02-26

## 2022-02-25 RX ORDER — CEFAZOLIN SODIUM/WATER 2 G/20 ML
2 SYRINGE (ML) INTRAVENOUS SEE ADMIN INSTRUCTIONS
Status: DISCONTINUED | OUTPATIENT
Start: 2022-02-25 | End: 2022-02-25 | Stop reason: HOSPADM

## 2022-02-25 RX ORDER — ACETAMINOPHEN 325 MG/1
650 TABLET ORAL EVERY 4 HOURS PRN
Status: DISCONTINUED | OUTPATIENT
Start: 2022-02-28 | End: 2022-02-26 | Stop reason: HOSPADM

## 2022-02-25 RX ADMIN — PROPOFOL 20 MCG/KG/MIN: 10 INJECTION, EMULSION INTRAVENOUS at 13:32

## 2022-02-25 RX ADMIN — ETOMIDATE 16 MG: 2 INJECTION INTRAVENOUS at 13:03

## 2022-02-25 RX ADMIN — METOPROLOL TARTRATE 25 MG: 25 TABLET, FILM COATED ORAL at 22:09

## 2022-02-25 RX ADMIN — ACETAMINOPHEN 1000 MG: 500 TABLET ORAL at 11:08

## 2022-02-25 RX ADMIN — MONTELUKAST 10 MG: 10 TABLET, FILM COATED ORAL at 22:08

## 2022-02-25 RX ADMIN — HYDROXYZINE HYDROCHLORIDE 10 MG: 10 TABLET ORAL at 18:01

## 2022-02-25 RX ADMIN — ACETAMINOPHEN 975 MG: 325 TABLET, FILM COATED ORAL at 20:07

## 2022-02-25 RX ADMIN — PHENYLEPHRINE HYDROCHLORIDE 0.2 MCG/KG/MIN: 10 INJECTION INTRAVENOUS at 13:55

## 2022-02-25 RX ADMIN — Medication 10 MG: at 13:20

## 2022-02-25 RX ADMIN — OXYCODONE HYDROCHLORIDE 5 MG: 5 TABLET ORAL at 22:29

## 2022-02-25 RX ADMIN — Medication 15 MG: at 13:22

## 2022-02-25 RX ADMIN — SODIUM CHLORIDE, POTASSIUM CHLORIDE, SODIUM LACTATE AND CALCIUM CHLORIDE: 600; 310; 30; 20 INJECTION, SOLUTION INTRAVENOUS at 14:42

## 2022-02-25 RX ADMIN — GLYCOPYRROLATE 0.4 MG: 0.2 INJECTION, SOLUTION INTRAMUSCULAR; INTRAVENOUS at 15:39

## 2022-02-25 RX ADMIN — Medication 5 MG: at 14:31

## 2022-02-25 RX ADMIN — PHENYLEPHRINE HYDROCHLORIDE 100 MCG: 10 INJECTION INTRAVENOUS at 13:22

## 2022-02-25 RX ADMIN — FENTANYL CITRATE 50 MCG: 50 INJECTION, SOLUTION INTRAMUSCULAR; INTRAVENOUS at 13:03

## 2022-02-25 RX ADMIN — ONDANSETRON 4 MG: 2 INJECTION INTRAMUSCULAR; INTRAVENOUS at 15:35

## 2022-02-25 RX ADMIN — TRANEXAMIC ACID 1950 MG: 650 TABLET ORAL at 11:11

## 2022-02-25 RX ADMIN — Medication 2 G: at 12:52

## 2022-02-25 RX ADMIN — NEOSTIGMINE METHYLSULFATE 3 MG: 1 INJECTION, SOLUTION INTRAVENOUS at 15:39

## 2022-02-25 RX ADMIN — Medication 24 ML: at 12:27

## 2022-02-25 RX ADMIN — CEFAZOLIN 1 G: 1 INJECTION, POWDER, FOR SOLUTION INTRAMUSCULAR; INTRAVENOUS at 22:09

## 2022-02-25 RX ADMIN — LIDOCAINE HYDROCHLORIDE 100 MG: 20 INJECTION, SOLUTION INFILTRATION; PERINEURAL at 13:03

## 2022-02-25 RX ADMIN — FERROUS SULFATE TAB 325 MG (65 MG ELEMENTAL FE) 325 MG: 325 (65 FE) TAB at 20:07

## 2022-02-25 RX ADMIN — Medication 5 MG: at 13:59

## 2022-02-25 RX ADMIN — PANTOPRAZOLE SODIUM 40 MG: 40 TABLET, DELAYED RELEASE ORAL at 20:07

## 2022-02-25 RX ADMIN — ASPIRIN 81 MG: 81 TABLET, COATED ORAL at 20:07

## 2022-02-25 RX ADMIN — Medication 5 MG: at 14:02

## 2022-02-25 RX ADMIN — MIDAZOLAM 0.5 MG: 1 INJECTION INTRAMUSCULAR; INTRAVENOUS at 12:22

## 2022-02-25 RX ADMIN — ROCURONIUM BROMIDE 40 MG: 50 INJECTION, SOLUTION INTRAVENOUS at 13:03

## 2022-02-25 RX ADMIN — DEXAMETHASONE SODIUM PHOSPHATE 4 MG: 4 INJECTION, SOLUTION INTRA-ARTICULAR; INTRALESIONAL; INTRAMUSCULAR; INTRAVENOUS; SOFT TISSUE at 13:18

## 2022-02-25 RX ADMIN — HYDROMORPHONE HYDROCHLORIDE 0.4 MG: 0.2 INJECTION, SOLUTION INTRAMUSCULAR; INTRAVENOUS; SUBCUTANEOUS at 16:34

## 2022-02-25 RX ADMIN — SODIUM CHLORIDE, POTASSIUM CHLORIDE, SODIUM LACTATE AND CALCIUM CHLORIDE: 600; 310; 30; 20 INJECTION, SOLUTION INTRAVENOUS at 11:00

## 2022-02-25 RX ADMIN — OXYCODONE HYDROCHLORIDE 10 MG: 5 TABLET ORAL at 18:01

## 2022-02-25 RX ADMIN — SENNOSIDES AND DOCUSATE SODIUM 1 TABLET: 50; 8.6 TABLET ORAL at 20:07

## 2022-02-25 RX ADMIN — SIMVASTATIN 40 MG: 40 TABLET, FILM COATED ORAL at 22:09

## 2022-02-25 RX ADMIN — HYDRALAZINE HYDROCHLORIDE 12.5 MG: 25 TABLET, FILM COATED ORAL at 22:08

## 2022-02-25 RX ADMIN — ALBUTEROL SULFATE 2 PUFF: 90 INHALANT RESPIRATORY (INHALATION) at 22:19

## 2022-02-25 RX ADMIN — SODIUM CHLORIDE, POTASSIUM CHLORIDE, SODIUM LACTATE AND CALCIUM CHLORIDE: 600; 310; 30; 20 INJECTION, SOLUTION INTRAVENOUS at 12:00

## 2022-02-25 ASSESSMENT — ACTIVITIES OF DAILY LIVING (ADL)
CONCENTRATING,_REMEMBERING_OR_MAKING_DECISIONS_DIFFICULTY: NO
CHANGE_IN_FUNCTIONAL_STATUS_SINCE_ONSET_OF_CURRENT_ILLNESS/INJURY: YES
ADLS_ACUITY_SCORE: 18
DRESSING/BATHING: BATHING DIFFICULTY, DEPENDENT;DRESSING DIFFICULTY, DEPENDENT
DRESSING/BATHING_DIFFICULTY: YES
NUMBER_OF_TIMES_PATIENT_HAS_FALLEN_WITHIN_LAST_SIX_MONTHS: 1
WALKING_OR_CLIMBING_STAIRS_DIFFICULTY: NO
ADLS_ACUITY_SCORE: 14
ADLS_ACUITY_SCORE: 14
TOILETING_ISSUES: NO
FALL_HISTORY_WITHIN_LAST_SIX_MONTHS: YES
WALKING_OR_CLIMBING_STAIRS: AMBULATION DIFFICULTY, ASSISTANCE 1 PERSON
ADLS_ACUITY_SCORE: 14
DOING_ERRANDS_INDEPENDENTLY_DIFFICULTY: YES
DIFFICULTY_EATING/SWALLOWING: NO
WEAR_GLASSES_OR_BLIND: YES
ADLS_ACUITY_SCORE: 18
ADLS_ACUITY_SCORE: 18

## 2022-02-25 ASSESSMENT — ENCOUNTER SYMPTOMS
SEIZURES: 0
ORTHOPNEA: 0
DYSRHYTHMIAS: 1

## 2022-02-25 NOTE — CONSULTS
[facility]    Cardiac Electrophysiology Consultation     Date of Admission:  2/25/2022  Date of Consult (When I saw the patient): 02/25/22    Assessment & Plan   Shiraz Ingram is a 87 year old male who was admitted on 2/25/2022. I was asked to see the patient for an urgent pre-op clearance as requested by Dr. Marroquin from anesthesiology. Pt has the following medical issues:  1.  Severe aortic stenosis status post TAVR 07/2020 with a 29 mm Medtronic Evolut Pro Plus valve complicated by complete heart block post TAVR required a permanent pacemaker implanted. Since then has recovered, now paced about 35% of the time.  3.  Coronary artery disease with history of CABG in 2002.    4.  Heart failure with left ventricular systolic dysfunction with EF 45-50%, moderate RV systolic dysfunction moderate pulmonary hypertension from echo 6/21.  5.  Stage III chronic renal failure   6.  Upper GI bleed  7.  Anemia   8.  Hypertension.  9.  permanent AFib  10.  Asthma    Despite these medical conditions Shiraz actually has been doing relatively well this past year. Unfortunately, a few weeks ago he was walking with his dog when he fell and injured his right shoulder and is scheduled to have surgery today. Prior to this fall he denies, sob, cp, palpitations or syncope. Ppm was checked a month ago showing AF with V pacing at 35% of the time. No ventricular arrhythmias noted. ECG done a few days ago shows AF with CVR and RBBB.    Pt is as compensated as can be from cardiac perspective. Overall amanda or post-cardiac risk relatively low. Resume Pradaxa when deems safe post-op.     Fede Van Cardona    Code Status    Prior    Primary Care Physician   Dany Rodriguez MD    History is obtained from the patient    Past Medical History   I have reviewed this patient's medical history and updated it with pertinent information if needed.   Past Medical History:   Diagnosis Date     Allergic rhinitis, cause unspecified       Anemia, unspecified      Aortic stenosis S/P TAVR      Benign neoplasm of colon 1999    Ademonatous polyp     CHF 2nd to TAVR -- S/P Pacemaker 12/2020     CKD (chronic kidney disease) stage 3, GFR 30-59 ml/min (H) 05/12/2011     Community acquired pneumonia 9/29/2020     Coronary atherosclerosis of unspecified type of vessel, native or graft     s/p CABG 2002     Esophageal ulcer w UGI bleed 05/24/2020    Had EGD adn caurtery 5/24/20, colonoscopy with diverticulosis then     Hyperlipidemia LDL goal < 100      Hypertension goal BP (blood pressure) < 140/90      Localized osteoarthrosis not specified whether primary or secondary, lower leg     left knee     Mild persistent asthma without complication 1/11/2016     Moderate persistent asthma      Persistent atrial fibrillation (H)      Unspecified hearing loss        Past Surgical History   I have reviewed this patient's surgical history and updated it with pertinent information if needed.  Past Surgical History:   Procedure Laterality Date     CARDIAC SURGERY       COLONOSCOPY N/A 5/26/2020    Procedure: COLONOSCOPY;  Surgeon: Ignacio Fournier MD;  Location:  GI     CV ANGIOGRAM CORONARY GRAFT N/A 4/24/2020    Procedure: Angiogram Coronary Graft;  Surgeon: Addison Willard MD;  Location: [facility] CARDIAC CATH LAB     CV CORONARY ANGIOGRAM N/A 4/24/2020    Procedure: Coronary Angiogram;  Surgeon: Addison Willard MD;  Location: [facility] CARDIAC CATH LAB     CV RIGHT HEART CATH MEASUREMENTS RECORDED N/A 4/24/2020    Procedure: Right Heart Cath;  Surgeon: Addison Willard MD;  Location:  HEART CARDIAC CATH LAB     CV TRANSCATHETER AORTIC VALVE REPLACEMENT N/A 7/14/2020    Procedure: Transcatheter Aortic Valve Replacement;  Surgeon: Soraida Monet MD;  Location:  HEART CARDIAC CATH LAB     EP PACEMAKER N/A 7/15/2020    Procedure: EP Pacemaker;  Surgeon: Tommie Sauer MD;  Location: [facility] CARDIAC CATH LAB     HC  ESOPHAGOSCOPY, DIAGNOSTIC  5/03    Dr. Dow, lower esophageal ring & mild gastritis     HERNIORRHAPHY INGUINAL Right 9/26/2016    Procedure: HERNIORRHAPHY INGUINAL;  Surgeon: Alexis Alexandra MD;  Location: [facility]     LAPAROSCOPIC CHOLECYSTECTOMY  12/03    for biliary sludge     [facility] NONSPECIFIC PROCEDURE  5/02    Coronary artery bypass     [facility] COLONOSCOPY THRU STOMA W BIOPSY/CAUTERY TUMOR/POLYP/LESION  5/03    Dr. Dow, Q 5 years     [facility] COLONOSCOPY THRU STOMA W BIOPSY/CAUTERY TUMOR/POLYP/LESION  12/199    Dr. Dow, one adenomatous polyp       Prior to Admission Medications   Prior to Admission Medications   Prescriptions Last Dose Informant Patient Reported? Taking?   acetaminophen (TYLENOL) 500 MG tablet 2/23/2022 at pm Other Yes Yes   Sig: Take 1,000 mg by mouth 2 times daily   albuterol (PROAIR HFA/PROVENTIL HFA/VENTOLIN HFA) 108 (90 Base) MCG/ACT inhaler 2/25/2022 at am Other No Yes   Sig: INHALE 1 TO 2 PUFFS BY MOUTH EVERY 4 TO 6 HOURS AS NEEDED   aspirin (ASA) 81 MG EC tablet 2/22/2022 at am Other Yes Yes   Sig: Take 81 mg by mouth daily   budesonide (PULMICORT) 0.5 MG/2ML nebulizer solution 2/25/2022 at am Other No Yes   Sig: Take 2 mLs (0.5 mg) by nebulization 2 times daily   calcium carbonate 600 mg-vitamin D 400 units (CALTRATE) 600-400 MG-UNIT per tablet 2/24/2022 at pm Other Yes Yes   Sig: Take 1 tablet by mouth daily   dabigatran ANTICOAGULANT (PRADAXA) 150 MG capsule 2/23/2022 at pm Other No Yes   Sig: Take 1 capsule (150 mg) by mouth 2 times daily   ferrous sulfate (IRON) 325 (65 Fe) MG tablet 2/24/2022 at pm Other No Yes   Sig: TAKE 1 TABLET(325 MG) BY MOUTH TWICE DAILY   hydrALAZINE (APRESOLINE) 25 MG tablet 2/25/2022 at am Other No Yes   Sig: Take 0.5 tablets (12.5 mg) by mouth 3 times daily   ipratropium - albuterol 0.5 mg/2.5 mg/3 mL (DUONEB) 0.5-2.5 (3) MG/3ML nebulization 2/24/2022 at pm Other No Yes   Sig: Inhale 1 vial (3 mLs) into the lungs 4 times daily   isosorbide mononitrate  (IMDUR) 30 MG 24 hr tablet 2022 at am Other No Yes   Sig: Take 1 tablet (30 mg) by mouth daily   magnesium chloride (MAG64) 535 (64 Mg) MG TBEC CR tablet 2022 at pm Other No Yes   Sig: Take 1 tablet (535 mg) by mouth 2 times daily   melatonin 3 MG tablet 2022 at pm Other No Yes   Sig: Take 1 tablet (3 mg) by mouth nightly as needed for sleep   metoprolol tartrate (LOPRESSOR) 25 MG tablet 2022 at am Other No Yes   Sig: TAKE 1 TABLET BY MOUTH TWICE A DAY   montelukast (SINGULAIR) 10 MG tablet 2022 at am Other No Yes   Sig: TAKE 1 TABLET BY MOUTH EVERY DAY   multivitamin, therapeutic (THERA-VIT) TABS tablet 2022 at am Other Yes Yes   Sig: Take 1 tablet by mouth every evening    nitroGLYcerin (NITROSTAT) 0.4 MG sublingual tablet  at prn Other No Yes   Sig: For chest pain place 1 tablet under the tongue every 5 minutes for 3 doses. If symptoms persist 5 minutes after 1st dose call 911.   pantoprazole (PROTONIX) 40 MG EC tablet 2022 at pm Other No Yes   Sig: TAKE 1 TABLET BEFORE MEALS TWICE A DAY   simvastatin (ZOCOR) 40 MG tablet 2022 at pm Other No Yes   Sig: Take 1 tablet (40 mg) by mouth At Bedtime   torsemide (DEMADEX) 5 MG tablet 2022 at am Other No Yes   Sig: Take 2 tablets (10 mg) by mouth daily      Facility-Administered Medications: None     Allergies   No Known Allergies    Social History   I have reviewed this patient's social history and updated it with pertinent information if needed. Shiraz Ingram  reports that he has never smoked. He has never used smokeless tobacco. He reports previous alcohol use. He reports that he does not use drugs.    Family History   I have reviewed this patient's family history and updated it with pertinent information if needed.   Family History   Problem Relation Age of Onset     C.A.D. Father      Cancer Mother         breast cancer,  of pneumonia     Cerebrovascular Disease Son      CABG Son      Family History Negative Child       Family History Negative Sister      Heart Disease Brother        Review of Systems   Comprehensive review of systems was performed with pertinent positives and negatives listed in assessment and plan section.    Physical Exam   Temp: 97.2  F (36.2  C) Temp src: Temporal BP: 124/47 Pulse: 56   Resp: 16 SpO2: 97 %      Vital Signs with Ranges  Temp:  [97.2  F (36.2  C)] 97.2  F (36.2  C)  Pulse:  [56] 56  Resp:  [16] 16  BP: (124)/(47) 124/47  SpO2:  [97 %] 97 %  157 lbs 0 oz    Constitutional: awake, alert, cooperative, no apparent distress, and appears stated age  Eyes: Lids and lashes normal, pupils equal, round, extra ocular muscles intact, sclera clear, conjunctiva normal  ENT: Normocephalic, without obvious abnormality, atraumatic, sinuses nontender on palpation, external ears without lesions, oral pharynx with moist mucous membranes, tonsils without erythema or exudates, gums normal and good dentition.  Hematologic / Lymphatic: no cervical lymphadenopathy  Respiratory: No increased work of breathing, good air exchange, clear to auscultation bilaterally, no crackles or wheezing  Cardiovascular: Normal apical impulse, regular rate and rhythm, normal S1 and S2, no S3 or S4, and no murmur noted  GI: No scars, normal bowel sounds, soft, non-distended, non-tender, no masses palpated, no hepatosplenomegally  Skin: no bruising or bleeding  Musculoskeletal: There is no redness, warmth, or swelling of the joints.  Full range of motion noted except right arm  Neurologic: Awake, alert,   Neuropsychiatric: General: normal, calm and normal eye contact    Data   I personally reviewed all recent ECGs and images.  Results for orders placed or performed during the hospital encounter of 02/25/22 (from the past 24 hour(s))   Creatinine   Result Value Ref Range    Creatinine 1.66 (H) 0.66 - 1.25 mg/dL    GFR Estimate 40 (L) >60 mL/min/1.73m2   Potassium   Result Value Ref Range    Potassium 4.7 3.4 - 5.3 mmol/L   ABO/Rh type and  screen    Narrative    The following orders were created for panel order ABO/Rh type and screen.  Procedure                               Abnormality         Status                     ---------                               -----------         ------                     Adult Type and Screen[[id number]]                            Final result                 Please view results for these tests on the individual orders.   Adult Type and Screen   Result Value Ref Range    ABO/RH(D) A POS     Antibody Screen Negative Negative    SPECIMEN EXPIRATION DATE [id number]

## 2022-02-25 NOTE — ANESTHESIA CARE TRANSFER NOTE
Patient: Shiraz Ingram    Procedure: Procedure(s):  RIGHT REVERSE SHOULDER ARTHROPLASTY, WITH OPEN REDUCTION INTERNAL FIXATION, WITH NO CUSTOM GUIDE       Diagnosis: Closed 4-part fracture of proximal humerus with delayed healing, right [S42.291G]  Diagnosis Additional Information: No value filed.    Anesthesia Type:   General     Note:    Oropharynx: oropharynx clear of all foreign objects and spontaneously breathing  Level of Consciousness: awake  Oxygen Supplementation: face mask  Level of Supplemental Oxygen (L/min / FiO2): 6  Independent Airway: airway patency satisfactory and stable  Dentition: dentition unchanged  Vital Signs Stable: post-procedure vital signs reviewed and stable  Report to RN Given: handoff report given  Patient transferred to: PACU  Comments:     Neuromuscular blockade reversed after TOF 4/4, spontaneous respirations, adequate tidal volumes, oropharynx suctioned with soft flexible catheter, extubated atraumatically, extubated with suction, airway patent after extubation.  Oxygen via facemask at 6 liters per minute to PACU. Oxygen tubing connected to wall O2 in PACU, SpO2, NiBP, and EKG monitors and alarms on and functioning, report on patient's clinical status given to PACU RN, RN questions answered.           Handoff Report: Identifed the Patient, Identified the Reponsible Provider, Reviewed the pertinent medical history, Discussed the surgical course, Reviewed Intra-OP anesthesia mangement and issues during anesthesia, Set expectations for post-procedure period and Allowed opportunity for questions and acknowledgement of understanding      Vitals:  Vitals Value Taken Time   /67 02/25/22 1614   Temp     Pulse 67 02/25/22 1614   Resp 21 02/25/22 1614   SpO2 100 % 02/25/22 1614   Vitals shown include unvalidated device data.    Electronically Signed By: EVA Crawford CRNA  February 25, 2022  4:15 PM

## 2022-02-25 NOTE — ANESTHESIA PREPROCEDURE EVALUATION
Anesthesia Pre-Procedure Evaluation    Patient: Shiraz Ingram   MRN: [id number] : 1934        Procedure : Procedure(s):  RIGHT REVERSE SHOULDER ARTHROPLASTY WITH NO CUSTOM GUIDE          Past Medical History:   Diagnosis Date     Allergic rhinitis, cause unspecified      Anemia, unspecified      Aortic stenosis S/P TAVR      Benign neoplasm of colon     Ademonatous polyp     CHF 2nd to TAVR -- S/P Pacemaker 2020     CKD (chronic kidney disease) stage 3, GFR 30-59 ml/min (H) 2011     Community acquired pneumonia 2020     Coronary atherosclerosis of unspecified type of vessel, native or graft     s/p CABG      Esophageal ulcer w UGI bleed 2020    Had EGD adn caurtery 20, colonoscopy with diverticulosis then     Hyperlipidemia LDL goal < 100      Hypertension goal BP (blood pressure) < 140/90      Localized osteoarthrosis not specified whether primary or secondary, lower leg     left knee     Mild persistent asthma without complication 2016     Moderate persistent asthma      Persistent atrial fibrillation (H)      Unspecified hearing loss       Past Surgical History:   Procedure Laterality Date     CARDIAC SURGERY       COLONOSCOPY N/A 2020    Procedure: COLONOSCOPY;  Surgeon: Ignacio Fournier MD;  Location:  GI     CV ANGIOGRAM CORONARY GRAFT N/A 2020    Procedure: Angiogram Coronary Graft;  Surgeon: Addison Willard MD;  Location: [facility] CARDIAC CATH LAB     CV CORONARY ANGIOGRAM N/A 2020    Procedure: Coronary Angiogram;  Surgeon: Addison Willard MD;  Location:  HEART CARDIAC CATH LAB     CV RIGHT HEART CATH MEASUREMENTS RECORDED N/A 2020    Procedure: Right Heart Cath;  Surgeon: Addison Willard MD;  Location: [facility] CARDIAC CATH LAB     CV TRANSCATHETER AORTIC VALVE REPLACEMENT N/A 2020    Procedure: Transcatheter Aortic Valve Replacement;  Surgeon: Soraida Monet MD;  Location:  HEART CARDIAC CATH LAB      EP PACEMAKER N/A 7/15/2020    Procedure: EP Pacemaker;  Surgeon: Tommie Sauer MD;  Location:  HEART CARDIAC CATH LAB     HC ESOPHAGOSCOPY, DIAGNOSTIC  5/03    Dr. Dow, lower esophageal ring & mild gastritis     HERNIORRHAPHY INGUINAL Right 9/26/2016    Procedure: HERNIORRHAPHY INGUINAL;  Surgeon: Alexis Alexandra MD;  Location:  [facility]     LAPAROSCOPIC CHOLECYSTECTOMY  12/03    for biliary sludge     Z NONSPECIFIC PROCEDURE  5/02    Coronary artery bypass     [facility]UT[facility]UMOR/POLYP/LESION  5/03    Dr. Dow, Q 5 years     ZZ COLONOSCOPY THRU STOMA W BIOPSY/CAUTERY TUMOR/POLYP/LESION  12/199    Dr. Dow, one adenomatous polyp      No Known Allergies   Social History     Tobacco Use     Smoking status: Never Smoker     Smokeless tobacco: Never Used     Tobacco comment: smoked for a week in 20's   Substance Use Topics     Alcohol use: Not Currently     Alcohol/week: 0.0 standard drinks      Wt Readings from Last 1 Encounters:   02/23/22 72.6 kg (160 lb)        Prior to Admission medications    Medication Sig Start Date End Date Taking? Authorizing Provider   acetaminophen (TYLENOL) 500 MG tablet Take 1,000 mg by mouth 2 times daily   Yes Reported, Patient   albuterol (PROAIR HFA/PROVENTIL HFA/VENTOLIN HFA) 108 (90 Base) MCG/ACT inhaler INHALE 1 TO 2 PUFFS BY MOUTH EVERY 4 TO 6 HOURS AS NEEDED 9/2/21  Yes Ty Wing, DO   aspirin (ASA) 81 MG EC tablet Take 81 mg by mouth daily   Yes Reported, Patient   budesonide (PULMICORT) 0.5 MG/2ML nebulizer solution Take 2 mLs (0.5 mg) by nebulization 2 times daily 6/1/15  Yes Dany Rodriguez MD   calcium carbonate 600 mg-vitamin D 400 units (CALTRATE) 600-400 MG-UNIT per tablet Take 1 tablet by mouth daily   Yes Unknown, Entered By History   dabigatran ANTICOAGULANT (PRADAXA) 150 MG capsule Take 1 capsule (150 mg) by mouth 2 times daily 2/12/21  Yes Amee Rocha PA-C   ferrous sulfate (IRON) 325 (65  Fe) MG tablet TAKE 1 TABLET(325 MG) BY MOUTH TWICE DAILY 4/18/18  Yes Dany Rodriguez MD   hydrALAZINE (APRESOLINE) 25 MG tablet Take 0.5 tablets (12.5 mg) by mouth 3 times daily 8/23/21  Yes Jaxon Field MD   ipratropium - albuterol 0.5 mg/2.5 mg/3 mL (DUONEB) 0.5-2.5 (3) MG/3ML nebulization Inhale 1 vial (3 mLs) into the lungs 4 times daily 8/5/14  Yes Dany Rodriguez MD   isosorbide mononitrate (IMDUR) 30 MG 24 hr tablet Take 1 tablet (30 mg) by mouth daily 1/11/22  Yes Jaxon Field MD   magnesium chloride (MAG64) 535 (64 Mg) MG TBEC CR tablet Take 1 tablet (535 mg) by mouth 2 times daily 4/27/21  Yes Gustavo Tierney MD   melatonin 3 MG tablet Take 1 tablet (3 mg) by mouth nightly as needed for sleep 7/29/20  Yes Nnamdi Sears MD   metoprolol tartrate (LOPRESSOR) 25 MG tablet TAKE 1 TABLET BY MOUTH TWICE A DAY 1/3/22  Yes Dany Rodriguez MD   montelukast (SINGULAIR) 10 MG tablet TAKE 1 TABLET BY MOUTH EVERY DAY 9/17/21  Yes Dany Rodriguez MD   multivitamin, therapeutic (THERA-VIT) TABS tablet Take 1 tablet by mouth every evening    Yes Unknown, Entered By History   nitroGLYcerin (NITROSTAT) 0.4 MG sublingual tablet For chest pain place 1 tablet under the tongue every 5 minutes for 3 doses. If symptoms persist 5 minutes after 1st dose call 911. 7/16/20  Yes Bozena Groves MD   pantoprazole (PROTONIX) 40 MG EC tablet TAKE 1 TABLET BEFORE MEALS TWICE A DAY 12/17/21  Yes Dany Rodriguez MD   simvastatin (ZOCOR) 40 MG tablet Take 1 tablet (40 mg) by mouth At Bedtime 5/17/21  Yes Gustavo Tierney MD   torsemide (DEMADEX) 5 MG tablet Take 2 tablets (10 mg) by mouth daily 12/8/21  Yes Amee Rocha PA-C     Recent Labs   Lab Test 03/15/21  1254   ABO A   RH Pos     Recent Results (from the past 744 hour(s))   Humerus XR, G/E 2 views, right    Narrative    EXAM: XR HUMERUS RIGHT G/E 2 VIEWS  LOCATION: [facility]  HOSPITAL  DATE/TIME: 2/11/2022 5:52 PM    INDICATION: pain after trauma  COMPARISON: None.      Impression    IMPRESSION: Complex fracture of the proximal humerus with impaction at the surgical/anatomic neck. There is a large displaced greater tuberosity fracture fragment and a horizontal cortical fracture fragment along the proximal humeral diaphysis. The   lesser tuberosity is not well-visualized. The humeral head is posteriorly rotated relative to the glenoid. Inferior subluxation of the humeral head without dislocation. No glenoid fracture is evident. The acromioclavicular joint is unremarkable.   Osteopenia.       Anesthesia Evaluation   Pt has had prior anesthetic. Type: General (Mac 4 = Grade 1 View previously).    No history of anesthetic complications       ROS/MED HX  ENT/Pulmonary: Comment: Deaf - Uses Sign     No rib fractures taken after fall - Left elevated hemidiaphragm noted.     (+) Moderate Persistent, asthma Treatment: Inhaler daily,   (-) sleep apnea and recent URI   Neurologic:    (-) no seizures and no CVA   Cardiovascular:     (+) Dyslipidemia hypertension--CAD -past MI CABG-date: 2002. -CHF Last EF: 45-50% date: 6/2021 pacemaker, Reason placed: CHB. dysrhythmias, 3rd Deg Heart Block, valvular problems/murmurs type: AS s/p TAVR. pulmonary hypertension, Previous cardiac testing   Echo: Date: 6/2021 Results:  Hypokinesis of the basal and mid inferolateral wall and basal inferior wall.  Left ventricular systolic function is mildly reduced.  The visual ejection fraction is estimated at 45-50%.  Diastolic Doppler findings (E/E' ratio and/or other parameters) suggest left  ventricular filling pressures are increased.  The right ventricle is mildly dilated.  Moderately decreased right ventricular systolic function  There is a bioprosthetic aortic valve (s/p TAVR, 29mm Medtronic Evolut Pro,  implanted 7/2020).  There is trace to mild aortic regurgitation. There appear to be two small  jets  of perivalvular regurgitation.  The gradient is normal for this prosthetic aortic valve.  There is mild to moderate (1-2+) tricuspid regurgitation.  Right ventricular systolic pressure is elevated, consistent with moderate  pulmonary hypertension.  Compared to prior study, there is no significant change.  Stress Test: Date: Results:    ECG Reviewed: Date: Results:    Cath: Date: Results:   (-) angina, orthopnea/PND, syncope, angina and wheezes   METS/Exercise Tolerance: >4 METS    Hematologic:     (+) anemia, history of blood transfusion, no previous transfusion reaction,     Musculoskeletal:       GI/Hepatic:    (-) GERD   Renal/Genitourinary:     (+) renal disease, type: CRI,     Endo:    (-) Type II DM, thyroid disease and chronic steroid usage   Psychiatric/Substance Use:       Infectious Disease:       Malignancy:       Other:            Physical Exam    Airway        Mallampati: II   TM distance: < 3 FB   Neck ROM: full   Mouth opening: > 3 cm    Respiratory Devices and Support         Dental           Cardiovascular          Rhythm and rate: irregular and normal   (+) murmur       Pulmonary           breath sounds clear to auscultation   (-) no rhonchi and no wheezes        OUTSIDE LABS:  CBC:   Lab Results   Component Value Date    WBC 3.9 (L) 02/23/2022    WBC 3.2 (L) 06/07/2021    HGB 9.2 (L) 02/23/2022    HGB 10.2 (L) 01/12/2022    HCT 30.1 (L) 02/23/2022    HCT 33.5 (L) 06/07/2021     02/23/2022     (L) 06/07/2021     BMP:   Lab Results   Component Value Date     02/23/2022     01/12/2022    POTASSIUM 5.4 (H) 02/23/2022    POTASSIUM 4.9 01/12/2022    CHLORIDE 106 02/23/2022    CHLORIDE 104 01/12/2022    CO2 23 02/23/2022    CO2 26 01/12/2022    BUN 53 (H) 02/23/2022    BUN 48 (H) 01/12/2022    CR 1.83 (H) 02/23/2022    CR 2.01 (H) 01/12/2022     (H) 02/23/2022     (H) 01/12/2022     COAGS:   Lab Results   Component Value Date    INR 1.51 (H) 03/15/2021      POC:   Lab Results   Component Value Date     (H) 03/16/2021     HEPATIC:   Lab Results   Component Value Date    ALBUMIN 3.5 02/23/2022    PROTTOTAL 7.3 02/23/2022    ALT 22 02/23/2022    AST 16 02/23/2022    ALKPHOS 72 02/23/2022    BILITOTAL 0.7 02/23/2022     OTHER:   Lab Results   Component Value Date    LACT 1.6 03/15/2021    AYALA 9.4 02/23/2022    PHOS 4.6 (H) 10/29/2020    MAG 1.8 12/16/2020    LIPASE 125 02/19/2013    TSH 1.68 03/15/2021    CRP 32.9 (H) 03/15/2021    SED 65 (H) 03/15/2021       Anesthesia Plan    ASA Status:  4   NPO Status:  NPO Appropriate    Anesthesia Type: General.     - Airway: ETT   Induction: Intravenous, Propofol.   Maintenance: Balanced.   Techniques and Equipment:     - Lines/Monitors: Arterial Line     - Drips/Meds: Phenylephrine     Consents    Anesthesia Plan(s) and associated risks, benefits, and realistic alternatives discussed. Questions answered and patient/representative(s) expressed understanding.    - Discussed:     - Discussed with:  Patient,       - Specific Concerns: Discussed potential of breathing difficulty after nerve block - given asthma and left elevated hemidiaphragm.  Discussed R/B/A of needing likely a lot of narcotics to control pain if ISB not done.  May need BIPAP postoperatively, but unlikely. .   Use of blood products discussed: Yes.     - Discussed with: Patient, Other (see comment).     - Consented: consented to blood products            Reason for refusal: other.     Postoperative Care    Pain management: Multi-modal analgesia, Peripheral nerve block (Single Shot).   PONV prophylaxis: Ondansetron (or other 5HT-3), Dexamethasone or Solumedrol, Background Propofol Infusion     Comments:    Other Comments: Limit Phenylephrine infusion (given Pulm HTN/RV issues)   Magnet over PPM - 35% paced typically     Dr Cardona kindly consulted in preop and cleared for surgery.             Alen Marroquin MD

## 2022-02-25 NOTE — PROGRESS NOTES
PTA medications completed by Medication Scribe day of surgery     Medication history sources: Patient's family/friend (), Surescripts and H&P  In the past week, patient estimated taking medication this percent of the time: Greater than 90%  Adherence assessment: N/A Not Observed    Significant changes made to the medication list:  Patient reports no longer taking the following meds (med scribe removed from PTA med list): Morphine      Additional medication history information:   Patient brought own home meds: Pulmicort Neb Solution, Albuterol Inhaler    Medication reconciliation completed by provider prior to medication history? No    Time spent in this activity: 25 minutes    The information provided in this note is only as accurate as the sources available at the time of update(s)    Prior to Admission medications    Medication Sig Last Dose Taking? Auth Provider   acetaminophen (TYLENOL) 500 MG tablet Take 1,000 mg by mouth 2 times daily 2/23/2022 at pm Yes Reported, Patient   albuterol (PROAIR HFA/PROVENTIL HFA/VENTOLIN HFA) 108 (90 Base) MCG/ACT inhaler INHALE 1 TO 2 PUFFS BY MOUTH EVERY 4 TO 6 HOURS AS NEEDED 2/25/2022 at am Yes Ty Wing DO   aspirin (ASA) 81 MG EC tablet Take 81 mg by mouth daily 2/22/2022 at am Yes Reported, Patient   budesonide (PULMICORT) 0.5 MG/2ML nebulizer solution Take 2 mLs (0.5 mg) by nebulization 2 times daily 2/25/2022 at am Yes Dany Rodriguez MD   calcium carbonate 600 mg-vitamin D 400 units (CALTRATE) 600-400 MG-UNIT per tablet Take 1 tablet by mouth daily 2/24/2022 at pm Yes Unknown, Entered By History   dabigatran ANTICOAGULANT (PRADAXA) 150 MG capsule Take 1 capsule (150 mg) by mouth 2 times daily 2/23/2022 at pm Yes Amee Rocha PA-C   ferrous sulfate (IRON) 325 (65 Fe) MG tablet TAKE 1 TABLET(325 MG) BY MOUTH TWICE DAILY 2/24/2022 at pm Yes Dany Rodriguez MD   hydrALAZINE (APRESOLINE) 25 MG tablet Take 0.5 tablets (12.5 mg)  by mouth 3 times daily 2/25/2022 at am Yes Jaxon Field MD   ipratropium - albuterol 0.5 mg/2.5 mg/3 mL (DUONEB) 0.5-2.5 (3) MG/3ML nebulization Inhale 1 vial (3 mLs) into the lungs 4 times daily 2/24/2022 at pm Yes Dany Rodriguez MD   isosorbide mononitrate (IMDUR) 30 MG 24 hr tablet Take 1 tablet (30 mg) by mouth daily 2/25/2022 at am Yes Jaxon Field MD   magnesium chloride (MAG64) 535 (64 Mg) MG TBEC CR tablet Take 1 tablet (535 mg) by mouth 2 times daily 2/24/2022 at pm Yes Gustavo Tierney MD   melatonin 3 MG tablet Take 1 tablet (3 mg) by mouth nightly as needed for sleep 2/22/2022 at pm Yes Nnamdi Sears MD   metoprolol tartrate (LOPRESSOR) 25 MG tablet TAKE 1 TABLET BY MOUTH TWICE A DAY 2/25/2022 at am Yes Dany Rodriguez MD   montelukast (SINGULAIR) 10 MG tablet TAKE 1 TABLET BY MOUTH EVERY DAY 2/24/2022 at am Yes Dany Rodriguez MD   multivitamin, therapeutic (THERA-VIT) TABS tablet Take 1 tablet by mouth every evening  2/24/2022 at am Yes Unknown, Entered By History   nitroGLYcerin (NITROSTAT) 0.4 MG sublingual tablet For chest pain place 1 tablet under the tongue every 5 minutes for 3 doses. If symptoms persist 5 minutes after 1st dose call 911.  at prn Yes Bozena Groves MD   pantoprazole (PROTONIX) 40 MG EC tablet TAKE 1 TABLET BEFORE MEALS TWICE A DAY 2/23/2022 at pm Yes Dany Rodriguez MD   simvastatin (ZOCOR) 40 MG tablet Take 1 tablet (40 mg) by mouth At Bedtime 2/24/2022 at pm Yes Gustavo Tierney MD   torsemide (DEMADEX) 5 MG tablet Take 2 tablets (10 mg) by mouth daily 2/24/2022 at am Yes Amee Rocha PA-C     Medication history completed by:    James Powers CPhT  Medication [facility]

## 2022-02-25 NOTE — BRIEF OP NOTE
[facility]    Brief Operative Note    Pre-operative diagnosis: Closed 4-part fracture of proximal humerus with delayed healing, right [S42.291G]  Post-operative diagnosis Same as pre-operative diagnosis    Procedure: Procedure(s):  RIGHT REVERSE SHOULDER ARTHROPLASTY, WITH OPEN REDUCTION INTERNAL FIXATION, WITH NO CUSTOM GUIDE  Surgeon: Surgeon(s) and Role:     * Wiley Vargas MD - Primary  Anesthesia: General   Estimated Blood Loss: 225ml    Drains: None  Specimens:   ID Type Source Tests Collected by Time Destination   A :  Blood Hand, Left CBC WITH PLATELETS Wiley Vargas MD 2/25/2022  1:56 PM      Findings:   None.  Complications: None.  Implants:   Implant Name Type Inv. Item Serial No.  Lot No. LRB No. Used Action   SCREW BSPLT 35MM 6.5MM AQLS PRFRM GLND RVRS CNTR NS [id number] - [id number] Metallic Hardware/[place] SCREW BSPLT 35MM 6.5MM AQLS PRFRM GLND RVRS CNTR [id number]  [employer]N [id number]  41  04 Right 1 Implanted   SCREW PERIPHERAL 5.0X34MM [id number] - [id number] Metallic Hardware/[place] SCREW PERIPHERAL 5.0X34MM [id number]  [employer] [id number]  41  04 Right 1 Implanted   SCREW PERIPHERAL 5.0X30MM [id number] - [id number] Metallic Hardware/[place] SCREW PERIPHERAL 5.0X30MM [id number]  [employer] [id number]  41  04 Right 1 Implanted   SCREW PERIPHERAL 22MM - [id number] Metallic Hardware/[place] SCREW PERIPHERAL 22MM  [employer] [id number]  41  04 Right 2 Implanted   BASEPLATE LATERAL RVRS 25MM OFFS +3MM [id number] - [id number] Total Joint Component/Insert BASEPLATE LATERAL RVRS 25MM OFFS +3MM [id number] [employer]  Right 1 Implanted   IMP  AEQUALIS ECCENTRIC GLENOSPHERE  39mm  +3mm  [id number]   [id number]   Right 1 Implanted   BONE CEMENT STRK SIMPLEX P SPEEDSET 6192-1-001 - [id number] Cement, Bone BONE CEMENT STRK SIMPLEX P SPEEDSET 6192-1-001  [employer] [id number] Right 1 Implanted   STEM HUMERAL PTC LNG 6B - [id number] Total Joint  Component/Insert STEM HUMERAL PTC LNG 6B [id number] [employer]  Right 1 Implanted   INSERT REVISION REVERSE +6/39MM [id number] - [id number] Total Joint Component/Insert INSERT REVISION REVERSE +6/39MM [id number] [place] MEDICAL [facility]  Right 1 Implanted   IMP TORNIER FLEX SHOULDER SYSTEM REVERSED TRAY ECCENTRIC   [id number] [employer]  Right 1 Implanted

## 2022-02-25 NOTE — ANESTHESIA PROCEDURE NOTES
Interscalene (Brachial Plexus via Interscalene Approach) Procedure Note    Pre-Procedure   Staff -        Anesthesiologist:  Alen Marroquin MD       Performed By: anesthesiologist       Location: pre-op       Pre-Anesthestic Checklist: patient identified, IV checked, site marked, risks and benefits discussed, informed consent, monitors and equipment checked, pre-op evaluation, at physician/surgeon's request and post-op pain management  Timeout:       Correct Patient: Yes        Correct Procedure: Yes        Correct Site: Yes        Correct Position: Yes        Correct Laterality: Yes        Site Marked: Yes  Procedure Documentation  Procedure: Interscalene (Brachial Plexus via Interscalene Approach)       Laterality: right       Patient Position: sitting       Patient Prep/Sterile Barriers: sterile gloves, mask, patient draped       Skin prep: Chloraprep       Local skin infiltrated with 3 mL of 1% lidocaine.        Needle Type: short bevel and insulated       Needle Gauge: 22.        Needle Length (millimeters): 50        Ultrasound guided       1. Ultrasound was used to identify targeted nerve, plexus, vascular marker, or fascial plane and place a needle adjacent to it in real-time.       2. Ultrasound was used to visualize the spread of anesthetic in close proximity to the above referenced structure.       3. A permanent image is entered into the patient's record.       4. The visualized anatomic structures appeared normal.       5. There were no apparent abnormal pathologic findings.    Assessment/Narrative         The placement was negative for: blood aspirated, painful injection and site bleeding       Paresthesias: Resolved and Yes.     Bolus given via needle..        Secured via.        Insertion/Infusion Method: Single Shot       Complications: none       Injection made incrementally with aspirations every 3 mL.    Medication(s) Administered   Bupivacaine 0.5% w/ 1:400K Epi (Injection), 24  mL  Medication Administration Time: 2/25/2022 12:27 PM     Comments:  Real-time U/S used to visualize brachial plexus at interscalene level and to place needle adjacent to C5-C6 nerve roots.  Easy injection 24 cc total 0.5% Bupivacaine with 1:400 K Epinephrine.  No signs/symptoms IV injection.  Pt tolerated well.  No complications.     Of note, large superficial transverse cervical artery coursing between C5-6 nerve roots making the block challenging.  We avoided entirely, but took more time and limited ability to move needle as well.

## 2022-02-25 NOTE — ANESTHESIA PROCEDURE NOTES
Arterial Line Procedure Note    Pre-Procedure   Staff -        Anesthesiologist:  Alen Marroquin MD       Performed By: anesthesiologist       Location: OR       Pre-Anesthestic Checklist: patient identified, IV checked, risks and benefits discussed, informed consent, monitors and equipment checked and pre-op evaluation  Timeout:       Correct Patient: Yes        Correct Procedure: Yes        Correct Site: Yes        Correct Position: Yes   Line Placement:   This line was placed Post Induction  Procedure   Procedure: arterial line       Laterality: left       Insertion Site: radial.  Sterile Prep        All elements of maximal sterile barrier technique followed       Patient Prep/Sterile Barriers: hand hygiene, sterile gloves, hat, mask, draped, draped       Skin prep: Chloraprep  Insertion/Injection        Technique: ultrasound guided        1. Ultrasound was used to evaluate the access site.       2. Artery evaluated via ultrasound for patency/adequacy.       3. Using real-time ultrasound the needle/catheter was observed entering the artery/vein.       5. The visualized structures were anatomically normal.       6. There were no apparent abnormal pathologic findings.       Catheter Type/Size: 20 gauge, 1.75 in/4.5 cm quick cath (integral wire)  Narrative         Secured by: other       Tegaderm dressing used.       Complications: None apparent,        Arterial waveform: Yes        IBP within 10% of NIBP: Yes   Comments:  Image NOT saved - do NOT bill.  Thank you.

## 2022-02-26 ENCOUNTER — APPOINTMENT (OUTPATIENT)
Dept: OCCUPATIONAL THERAPY | Facility: CLINIC | Age: 87
End: 2022-02-26
Attending: PHYSICIAN ASSISTANT
Payer: COMMERCIAL

## 2022-02-26 ENCOUNTER — OFFICE VISIT (OUTPATIENT)
Dept: INTERPRETER SERVICES | Facility: CLINIC | Age: 87
End: 2022-02-26
Payer: COMMERCIAL

## 2022-02-26 VITALS
WEIGHT: 157 LBS | HEART RATE: 60 BPM | SYSTOLIC BLOOD PRESSURE: 142 MMHG | RESPIRATION RATE: 18 BRPM | HEIGHT: 68 IN | OXYGEN SATURATION: 94 % | TEMPERATURE: 97 F | BODY MASS INDEX: 23.79 KG/M2 | DIASTOLIC BLOOD PRESSURE: 58 MMHG

## 2022-02-26 LAB
ANION GAP SERPL CALCULATED.3IONS-SCNC: 5 MMOL/L (ref 3–14)
BASOPHILS # BLD AUTO: 0 10E3/UL (ref 0–0.2)
BASOPHILS NFR BLD AUTO: 0 %
BUN SERPL-MCNC: 46 MG/DL (ref 7–30)
CALCIUM SERPL-MCNC: 8.8 MG/DL (ref 8.5–10.1)
CHLORIDE BLD-SCNC: 103 MMOL/L (ref 94–109)
CO2 SERPL-SCNC: 24 MMOL/L (ref 20–32)
CREAT SERPL-MCNC: 1.58 MG/DL (ref 0.66–1.25)
EOSINOPHIL # BLD AUTO: 0 10E3/UL (ref 0–0.7)
EOSINOPHIL NFR BLD AUTO: 1 %
ERYTHROCYTE [DISTWIDTH] IN BLOOD BY AUTOMATED COUNT: 19.8 % (ref 10–15)
GFR SERPL CREATININE-BSD FRML MDRD: 42 ML/MIN/1.73M2
GLUCOSE BLD-MCNC: 132 MG/DL (ref 70–99)
GLUCOSE BLDC GLUCOMTR-MCNC: 106 MG/DL (ref 70–99)
HCT VFR BLD AUTO: 28.6 % (ref 40–53)
HGB BLD-MCNC: 8.8 G/DL (ref 13.3–17.7)
IMM GRANULOCYTES # BLD: 0 10E3/UL
IMM GRANULOCYTES NFR BLD: 0 %
LYMPHOCYTES # BLD AUTO: 0.5 10E3/UL (ref 0.8–5.3)
LYMPHOCYTES NFR BLD AUTO: 9 %
MCH RBC QN AUTO: 21.9 PG (ref 26.5–33)
MCHC RBC AUTO-ENTMCNC: 30.8 G/DL (ref 31.5–36.5)
MCV RBC AUTO: 71 FL (ref 78–100)
MONOCYTES # BLD AUTO: 0.5 10E3/UL (ref 0–1.3)
MONOCYTES NFR BLD AUTO: 10 %
NEUTROPHILS # BLD AUTO: 4.1 10E3/UL (ref 1.6–8.3)
NEUTROPHILS NFR BLD AUTO: 80 %
NRBC # BLD AUTO: 0 10E3/UL
NRBC BLD AUTO-RTO: 0 /100
PLATELET # BLD AUTO: 208 10E3/UL (ref 150–450)
POTASSIUM BLD-SCNC: 5 MMOL/L (ref 3.4–5.3)
RBC # BLD AUTO: 4.01 10E6/UL (ref 4.4–5.9)
SODIUM SERPL-SCNC: 132 MMOL/L (ref 133–144)
WBC # BLD AUTO: 5.1 10E3/UL (ref 4–11)

## 2022-02-26 PROCEDURE — 999N000128 HC STATISTIC PERIPHERAL IV START W/O US GUIDANCE

## 2022-02-26 PROCEDURE — 97110 THERAPEUTIC EXERCISES: CPT | Mod: GO

## 2022-02-26 PROCEDURE — 97535 SELF CARE MNGMENT TRAINING: CPT | Mod: GO

## 2022-02-26 PROCEDURE — 97166 OT EVAL MOD COMPLEX 45 MIN: CPT | Mod: GO

## 2022-02-26 PROCEDURE — 99225 PR SUBSEQUENT OBSERVATION CARE,LEVEL II: CPT | Performed by: INTERNAL MEDICINE

## 2022-02-26 PROCEDURE — 99207 PR CDG-CODE CATEGORY CHANGED: CPT | Performed by: INTERNAL MEDICINE

## 2022-02-26 PROCEDURE — 36415 COLL VENOUS BLD VENIPUNCTURE: CPT | Performed by: PHYSICIAN ASSISTANT

## 2022-02-26 PROCEDURE — 80048 BASIC METABOLIC PNL TOTAL CA: CPT | Performed by: PHYSICIAN ASSISTANT

## 2022-02-26 PROCEDURE — 250N000013 HC RX MED GY IP 250 OP 250 PS 637: Performed by: PHYSICIAN ASSISTANT

## 2022-02-26 PROCEDURE — T1013 SIGN LANG/ORAL INTERPRETER: HCPCS | Mod: U3

## 2022-02-26 PROCEDURE — 85025 COMPLETE CBC W/AUTO DIFF WBC: CPT | Performed by: PHYSICIAN ASSISTANT

## 2022-02-26 PROCEDURE — 250N000011 HC RX IP 250 OP 636: Performed by: PHYSICIAN ASSISTANT

## 2022-02-26 PROCEDURE — 97530 THERAPEUTIC ACTIVITIES: CPT | Mod: GO

## 2022-02-26 PROCEDURE — 82962 GLUCOSE BLOOD TEST: CPT

## 2022-02-26 RX ORDER — HYDROMORPHONE HYDROCHLORIDE 2 MG/1
2 TABLET ORAL EVERY 6 HOURS PRN
Qty: 30 TABLET | Refills: 0 | Status: SHIPPED | OUTPATIENT
Start: 2022-02-26 | End: 2022-03-21

## 2022-02-26 RX ORDER — BUDESONIDE 0.5 MG/2ML
0.5 INHALANT ORAL
Status: DISCONTINUED | OUTPATIENT
Start: 2022-02-26 | End: 2022-02-26 | Stop reason: HOSPADM

## 2022-02-26 RX ORDER — HYDROMORPHONE HYDROCHLORIDE 2 MG/1
2 TABLET ORAL EVERY 4 HOURS PRN
Status: DISCONTINUED | OUTPATIENT
Start: 2022-02-26 | End: 2022-02-26 | Stop reason: HOSPADM

## 2022-02-26 RX ORDER — DABIGATRAN ETEXILATE 150 MG/1
150 CAPSULE ORAL 2 TIMES DAILY
Status: DISCONTINUED | OUTPATIENT
Start: 2022-02-26 | End: 2022-02-26 | Stop reason: HOSPADM

## 2022-02-26 RX ORDER — ASPIRIN 81 MG/1
81 TABLET ORAL DAILY
Status: DISCONTINUED | OUTPATIENT
Start: 2022-02-26 | End: 2022-02-26 | Stop reason: HOSPADM

## 2022-02-26 RX ORDER — AMOXICILLIN 250 MG
1 CAPSULE ORAL 2 TIMES DAILY
Qty: 60 TABLET | Refills: 0 | Status: SHIPPED | OUTPATIENT
Start: 2022-02-26 | End: 2022-04-26

## 2022-02-26 RX ORDER — HYDROMORPHONE HYDROCHLORIDE 2 MG/1
2 TABLET ORAL
Status: DISCONTINUED | OUTPATIENT
Start: 2022-02-26 | End: 2022-02-26

## 2022-02-26 RX ADMIN — OXYCODONE HYDROCHLORIDE 5 MG: 5 TABLET ORAL at 04:16

## 2022-02-26 RX ADMIN — HYDROMORPHONE HYDROCHLORIDE 2 MG: 2 TABLET ORAL at 09:38

## 2022-02-26 RX ADMIN — CEFAZOLIN 1 G: 1 INJECTION, POWDER, FOR SOLUTION INTRAMUSCULAR; INTRAVENOUS at 04:04

## 2022-02-26 RX ADMIN — TORSEMIDE 10 MG: 10 TABLET ORAL at 08:16

## 2022-02-26 RX ADMIN — HYDRALAZINE HYDROCHLORIDE 12.5 MG: 25 TABLET, FILM COATED ORAL at 08:16

## 2022-02-26 RX ADMIN — ONDANSETRON 4 MG: 2 INJECTION INTRAMUSCULAR; INTRAVENOUS at 09:47

## 2022-02-26 RX ADMIN — HYDROMORPHONE HYDROCHLORIDE 0.2 MG: 0.2 INJECTION, SOLUTION INTRAMUSCULAR; INTRAVENOUS; SUBCUTANEOUS at 08:48

## 2022-02-26 RX ADMIN — HYDROMORPHONE HYDROCHLORIDE 0.2 MG: 0.2 INJECTION, SOLUTION INTRAMUSCULAR; INTRAVENOUS; SUBCUTANEOUS at 08:12

## 2022-02-26 RX ADMIN — DABIGATRAN ETEXILATE MESYLATE 150 MG: 150 CAPSULE ORAL at 09:38

## 2022-02-26 RX ADMIN — FERROUS SULFATE TAB 325 MG (65 MG ELEMENTAL FE) 325 MG: 325 (65 FE) TAB at 08:16

## 2022-02-26 RX ADMIN — ALBUTEROL SULFATE 2 PUFF: 90 INHALANT RESPIRATORY (INHALATION) at 09:46

## 2022-02-26 RX ADMIN — ISOSORBIDE MONONITRATE 30 MG: 30 TABLET, EXTENDED RELEASE ORAL at 08:16

## 2022-02-26 RX ADMIN — OXYCODONE HYDROCHLORIDE 5 MG: 5 TABLET ORAL at 03:58

## 2022-02-26 RX ADMIN — METOPROLOL TARTRATE 25 MG: 25 TABLET, FILM COATED ORAL at 08:16

## 2022-02-26 RX ADMIN — SENNOSIDES AND DOCUSATE SODIUM 1 TABLET: 50; 8.6 TABLET ORAL at 08:16

## 2022-02-26 RX ADMIN — ASPIRIN 81 MG: 81 TABLET, COATED ORAL at 08:16

## 2022-02-26 RX ADMIN — PANTOPRAZOLE SODIUM 40 MG: 40 TABLET, DELAYED RELEASE ORAL at 08:16

## 2022-02-26 RX ADMIN — ACETAMINOPHEN 975 MG: 325 TABLET, FILM COATED ORAL at 12:38

## 2022-02-26 RX ADMIN — ACETAMINOPHEN 975 MG: 325 TABLET, FILM COATED ORAL at 03:58

## 2022-02-26 ASSESSMENT — ACTIVITIES OF DAILY LIVING (ADL)
ADLS_ACUITY_SCORE: 18

## 2022-02-26 NOTE — PROGRESS NOTES
Patient vital signs are at baseline: YES  Patient able to ambulate as they were prior to admission or with assist devices provided by therapies during their stay:  YES  Patient MUST void prior to discharge:  No, voiding small amount. Bladder scanned for 499.   Patient able to tolerate oral intake:  yes  Pain has adequate pain control using Oral analgesics:  yes

## 2022-02-26 NOTE — PROGRESS NOTES
"ORTHOPEDIC UPPER EXTREMITY PROGRESS NOTE    POD# 1  Patient is a 87 year old male who underwent Procedure(s):  RIGHT REVERSE SHOULDER ARTHROPLASTY, WITH OPEN REDUCTION INTERNAL FIXATION, WITH NO CUSTOM GUIDE on 2/25/2022 on right. Pain is not controlled.  Lost his IV overnight and rates his pain at a 9/10.  Also had some emesis, thinks it is related to the oxycodone.  Would like a pain medication change.  Has not been up yet.    Vitals:   BP (!) 142/58 (BP Location: Left arm, Patient Position: Semi-Alvarez's)   Pulse 60   Temp 97  F (36.1  C) (Oral)   Resp 20   Ht 1.727 m (5' 8\")   Wt 71.2 kg (157 lb)   SpO2 94%   BMI 23.87 kg/m        EXAM   The patient is awake and alert. Deaf, communicates with notepad and through .  Sensation is intact.  Digital Flexion/Extension maintained.   Brisk cap refill.   The incision is healing well, prineo in place CDI.     Labs: Recent Labs   Lab Test 02/25/22  1356 02/23/22  1412 01/12/22  1028 03/16/21  0542 03/15/21  1254 07/14/20  1650 07/13/20  1420 07/03/20  1558 06/03/20  1529   HGB 8.0* 9.2* 10.2*   < > 10.6*   < > 11.5*   < > 9.6*   INR  --   --   --   --  1.51*  --  1.21*  --  1.25*    < > = values in this interval not displayed.       ASSESSMENT  S/p right reverse TSA with ORIF proximal humerus fracture     PLAN  1. Continue with sling  2. Weight Bearing NWB  3. Wound Care leave undisturbed  4. Discharge anticipated date pending progress , today vs tomorrow  5. Cont Pain Control will try oral dilaudid and IV team with attempt new IV this am    6. Hydroxyzine for additional pain control    Kjerstin Foss PA-C [facility]    "

## 2022-02-26 NOTE — CONSULTS
[facility]  Consult Note - Hospitalist Service  Date of Admission:  2/25/2022  Consult Requested by: Ileana Mcneal PA-C  Reason for Consult: Post-operative medical co-management    Assessment & Plan   Shiraz Ingram is a 87 year old deaf male requiring  with PMHx of CKD IIIb, severe aortic stenosis s/p TAVR (12/2020), CAD s/p CABG (2002), permanent atrial fibrillation on chronic anticoagulation with Pradaxa, combined systolic and diastolic heart failure, anemia of chronic disease and humerus fracture with delayed healing who was admitted on 2/25/2022 and underwent right reverse shoulder arthroplasty with ORIF by Dr. Vargas 2/25/22. Hospitalist service was consulted for medical co-management.     Humerus fracture s/p right reverse shoulder arthroplasty with ORIF: Surgery per Dr. Vargas for closed 4 part fracture with delayed healing. General anesthesia used.  ccs.   -- Defer routine post-operative cares, IVF, DVT prophylaxis and pain control to primary service   -- Treated with periop Ancef   -- Analgesic regimen with tylenol 975 mg q8 hrs, oxycodone 5-10 mg q4 hrs PRN, IV dilaudid 0.2-0.4 mg q2 hrs PRN   -- DVT prophylaxis with ASA 81 mg BID. Defer resumption of PTA Pradaxa to primary team, resume as soon as possible.   -- Encourage pulmonary toilet; incentive spirometer at bedside   -- Bowel regimen in place while on narcotics   -- PT and OT in the AM   -- CBC and BMP in AM      CKD IIIb: Baseline creatinine 1.6-1.8.   - BMP in the AM        CAD, S/P CABG in 2002  Severe aortic stenosis S/P TAVR (12/2020)  Hypertension   Hyperlipidemia:   - Seen by Dr. Elam 2/25 prior to surgery per Anesthesia request  - Continue PTA hydralazine 112.5 mg TID, Imdur 20 mg po every day, Lopressor 25 mg BID, Simvastatin 40 mg po at bedtime. Hold paramters placed on antihypertensives.      Permanent atrial fibrillation  Hx of MAYA thrombus   Complete heart block and s/p pacemaker:   - Continue  Lopressor as above, defer resumption of Pradaxa to Cardiology     Chronic combined systolic and diastolic heart failure, not in acute exacerbation:   - Judicious use of fluids   - Continue Torsemide 10 mg po every day  - Monitor for signs of fluid overload   - I&Os, daily weights      Bilateral deafness:  at bedside.      Anemia of chronic disease  Hx of upper GI bleed: Baseline Hgb 9.0-10.5  - Hgb to assess for post-op anemia   - Continue PTA iron supplementation  - Judicious use of NSAIDs      Asthma, not in acute exacerbation: Resume all PTA inhalers and medications.      The patient's care was discussed with the Attending Physician, Dr. Byrd, Bedside Nurse and Patient.    Stormy Stanton PA-C  [facility]  Securely message with the Vocera Web Console (learn more here)  Text page via [facility] Paging/Directory   ___________________________________________________________________    Chief Complaint   Humerus fracture    History is obtained from the patient    History of Present Illness   Shiraz Ingram is a 87 year old deaf male requiring  with PMHx of CKD IIIb, severe aortic stenosis s/p TAVR (12/2020), CAD s/p CABG (2002), permanent atrial fibrillation on chronic anticoagulation with Pradaxa, combined systolic and diastolic heart failure, anemia of chronic disease and humerus fracture with delayed healing who was admitted on 2/25/2022 and underwent right reverse shoulder arthroplasty with ORIF by Dr. Vargas 2/25/22. Hospitalist service was consulted for medical co-management.     Resting comfortably in bed eating dinner. Denies chest pain or SOB. Reports that he had sustained a mechanical fall after slipping on ice which caused his humerus fracture. He is otherwise independent of ADLs. Denies any dizziness, lightheadedness. Compliant with medications. No recent changes. No acute concerns at present.     Review of Systems   The 10 point Review of  Systems is negative other than noted in the HPI.    Past Medical History    I have reviewed this patient's medical history and updated it with pertinent information if needed.   Past Medical History:   Diagnosis Date     Allergic rhinitis, cause unspecified      Anemia, unspecified      Aortic stenosis S/P TAVR      Benign neoplasm of colon 1999    Ademonatous polyp     CHF 2nd to TAVR -- S/P Pacemaker 12/2020     CKD (chronic kidney disease) stage 3, GFR 30-59 ml/min (H) 05/12/2011     Community acquired pneumonia 9/29/2020     Coronary atherosclerosis of unspecified type of vessel, native or graft     s/p CABG 2002     Esophageal ulcer w UGI bleed 05/24/2020    Had EGD adn caurtery 5/24/20, colonoscopy with diverticulosis then     Hyperlipidemia LDL goal < 100      Hypertension goal BP (blood pressure) < 140/90      Localized osteoarthrosis not specified whether primary or secondary, lower leg     left knee     Mild persistent asthma without complication 1/11/2016     Moderate persistent asthma      Persistent atrial fibrillation (H)      Unspecified hearing loss      Past Surgical History   I have reviewed this patient's surgical history and updated it with pertinent information if needed.  Past Surgical History:   Procedure Laterality Date     CARDIAC SURGERY       COLONOSCOPY N/A 5/26/2020    Procedure: COLONOSCOPY;  Surgeon: Ignacio Fournier MD;  Location:  GI     CV ANGIOGRAM CORONARY GRAFT N/A 4/24/2020    Procedure: Angiogram Coronary Graft;  Surgeon: Addison Willard MD;  Location:  HEART CARDIAC CATH LAB     CV CORONARY ANGIOGRAM N/A 4/24/2020    Procedure: Coronary Angiogram;  Surgeon: Addison Willard MD;  Location:  HEART CARDIAC CATH LAB     CV RIGHT HEART CATH MEASUREMENTS RECORDED N/A 4/24/2020    Procedure: Right Heart Cath;  Surgeon: Addison Willard MD;  Location:  HEART CARDIAC CATH LAB     CV TRANSCATHETER AORTIC VALVE REPLACEMENT N/A 7/14/2020    Procedure:  Transcatheter Aortic Valve Replacement;  Surgeon: Soraida Monet MD;  Location:  HEART CARDIAC CATH LAB     EP PACEMAKER N/A 7/15/2020    Procedure: EP Pacemaker;  Surgeon: Tommie Sauer MD;  Location:  HEART CARDIAC CATH LAB     HC ESOPHAGOSCOPY, DIAGNOSTIC      Dr. Dow, lower esophageal ring & mild gastritis     HERNIORRHAPHY INGUINAL Right 2016    Procedure: HERNIORRHAPHY INGUINAL;  Surgeon: Alexis Alexandra MD;  Location: [facility]     LAPAROSCOPIC CHOLECYSTECTOMY      for biliary sludge     ZZC NONSPECIFIC PROCEDURE      Coronary artery bypass     ZZHC COLONOSCOPY THRU STOMA W BIOPSY/CAUTERY TUMOR/POLYP/LESION      Dr. Dow, Q 5 years     ZZHC COLONOSCOPY THRU STOMA W BIOPSY/CAUTERY TUMOR/POLYP/LESION      Dr. Dow, one adenomatous polyp     Social History   I have reviewed this patient's social history and updated it with pertinent information if needed.  Social History     Tobacco Use     Smoking status: Never Smoker     Smokeless tobacco: Never Used     Tobacco comment: smoked for a week in 20's   Substance Use Topics     Alcohol use: Not Currently     Alcohol/week: 0.0 standard drinks     Drug use: No     Family History   I have reviewed this patient's family history and updated it with pertinent information if needed.  Family History   Problem Relation Age of Onset     C.A.D. Father      Cancer Mother         breast cancer,  of pneumonia     Cerebrovascular Disease Son      CABG Son      Family History Negative Child      Family History Negative Sister      Heart Disease Brother      Medications   Medications Prior to Admission   Medication Sig Dispense Refill Last Dose     acetaminophen (TYLENOL) 500 MG tablet Take 1,000 mg by mouth 2 times daily   2022 at pm     albuterol (PROAIR HFA/PROVENTIL HFA/VENTOLIN HFA) 108 (90 Base) MCG/ACT inhaler INHALE 1 TO 2 PUFFS BY MOUTH EVERY 4 TO 6 HOURS AS NEEDED 18 g 2 2022 at a1200     aspirin  (ASA) 81 MG EC tablet Take 81 mg by mouth daily   2/22/2022 at am     budesonide (PULMICORT) 0.5 MG/2ML nebulizer solution Take 2 mLs (0.5 mg) by nebulization 2 times daily 120 mL 0 2/25/2022 at am     calcium carbonate 600 mg-vitamin D 400 units (CALTRATE) 600-400 MG-UNIT per tablet Take 1 tablet by mouth daily   2/24/2022 at pm     dabigatran ANTICOAGULANT (PRADAXA) 150 MG capsule Take 1 capsule (150 mg) by mouth 2 times daily 180 capsule 3 2/23/2022 at pm     ferrous sulfate (IRON) 325 (65 Fe) MG tablet TAKE 1 TABLET(325 MG) BY MOUTH TWICE DAILY 180 tablet 0 2/24/2022 at pm     hydrALAZINE (APRESOLINE) 25 MG tablet Take 0.5 tablets (12.5 mg) by mouth 3 times daily 135 tablet 3 2/25/2022 at am     ipratropium - albuterol 0.5 mg/2.5 mg/3 mL (DUONEB) 0.5-2.5 (3) MG/3ML nebulization Inhale 1 vial (3 mLs) into the lungs 4 times daily 360 mL 11 2/25/2022 at am     isosorbide mononitrate (IMDUR) 30 MG 24 hr tablet Take 1 tablet (30 mg) by mouth daily 90 tablet 1 2/25/2022 at am     magnesium chloride (MAG64) 535 (64 Mg) MG TBEC CR tablet Take 1 tablet (535 mg) by mouth 2 times daily 180 tablet 3 2/24/2022 at pm     melatonin 3 MG tablet Take 1 tablet (3 mg) by mouth nightly as needed for sleep 30 tablet 0 2/22/2022 at pm     metoprolol tartrate (LOPRESSOR) 25 MG tablet TAKE 1 TABLET BY MOUTH TWICE A  tablet 1 2/25/2022 at am     montelukast (SINGULAIR) 10 MG tablet TAKE 1 TABLET BY MOUTH EVERY DAY 90 tablet 1 2/24/2022 at am     multivitamin, therapeutic (THERA-VIT) TABS tablet Take 1 tablet by mouth every evening    2/24/2022 at am     nitroGLYcerin (NITROSTAT) 0.4 MG sublingual tablet For chest pain place 1 tablet under the tongue every 5 minutes for 3 doses. If symptoms persist 5 minutes after 1st dose call 911. 12 tablet 0  at prn     pantoprazole (PROTONIX) 40 MG EC tablet TAKE 1 TABLET BEFORE MEALS TWICE A  tablet 1 2/23/2022 at pm     simvastatin (ZOCOR) 40 MG tablet Take 1 tablet (40 mg) by mouth  At Bedtime 90 tablet 3 2/24/2022 at pm     torsemide (DEMADEX) 5 MG tablet Take 2 tablets (10 mg) by mouth daily 180 tablet 3 2/24/2022 at am       Allergies   No Known Allergies    Physical Exam   Vital Signs: Temp: (P) 97.1  F (36.2  C) Temp src: (P) Oral BP: (!) (P) 142/69 Pulse: (P) 55   Resp: (P) 15 SpO2: (P) 93 % O2 Device: (P) None (Room air) Oxygen Delivery: 6 LPM  Weight: 157 lbs 0 oz    CONSTITUTIONAL: Pt laying in bed, dressed in hospital garb. Appears comfortable. Cooperative with interview.  HEENT: Normocephalic, atraumatic.   CARDIOVASCULAR: RRR, no murmurs, rubs, or extra heart sounds appreciated. Pulses +2/4 and regular in upper and lower extremities, bilaterally.   RESPIRATORY: No increased work of breathing.  CTA, bilat; no wheezes, rales, or rhonchi appreciated.  GASTROINTESTINAL:  Abdomen soft, non-distended. BS auscultated in all four quadrants. Negative for tenderness to palpation.  No masses or organomegaly noted.  MUSCULOSKELETAL: No gross deformities noted. Normal muscle tone.   HEMATOLOGIC/LYMPHATIC/IMMUNOLOGIC: Negative for lower extremity edema, bilaterally.  NEUROLOGIC: Alert and oriented to person, place, and time.  strength intact. Right arm in sling.   SKIN: Warm, dry, intact.     Data   Results for orders placed or performed during the hospital encounter of 02/25/22 (from the past 24 hour(s))   Creatinine   Result Value Ref Range    Creatinine 1.66 (H) 0.66 - 1.25 mg/dL    GFR Estimate 40 (L) >60 mL/min/1.73m2   Potassium   Result Value Ref Range    Potassium 4.7 3.4 - 5.3 mmol/L   ABO/Rh type and screen    Narrative    The following orders were created for panel order ABO/Rh type and screen.  Procedure                               Abnormality         Status                     ---------                               -----------         ------                     Adult Type and Screen[[id number]]                            Final result                 Please view results for  these tests on the individual orders.   Adult Type and Screen   Result Value Ref Range    ABO/RH(D) A POS     Antibody Screen Negative Negative    SPECIMEN EXPIRATION DATE [id number]    CBC with platelets   Result Value Ref Range    WBC Count 3.0 (L) 4.0 - 11.0 10e3/uL    RBC Count 3.86 (L) 4.40 - 5.90 10e6/uL    Hemoglobin 8.0 (L) 13.3 - 17.7 g/dL    Hematocrit 26.3 (L) 40.0 - 53.0 %    MCV 68 (L) 78 - 100 fL    MCH 20.7 (L) 26.5 - 33.0 pg    MCHC 30.4 (L) 31.5 - 36.5 g/dL    RDW 18.3 (H) 10.0 - 15.0 %    Platelet Count 182 150 - 450 10e3/uL   Prepare red blood cells (unit)   Result Value Ref Range    CROSSMATCH Compatible     UNIT ABO/RH A Pos     Unit Number [id number]     Unit Status Issued     Blood Component Type Red Blood Cells     Product Code [id number]     CODING SYSTEM [id number]     UNIT TYPE ISBT 6200     ISSUE DATE AND TIME [id number]    XR Surgery LATRICE Fluoro L/T 5 Min w Stills    Narrative    SURGERY C-ARM FLUOROSCOPY LESS THAN FIVE MINUTES WITH STILLS    2/25/2022 3:30 PM     HISTORY: Right Total Shoulder    NUMBER OF IMAGES ACQUIRED: 3    FLUOROSCOPY TIME: 0.4 minute      Impression    IMPRESSION: Three intraoperative fluoroscopic spot images are provided  for review during placement of right reverse ball-and-socket total  shoulder arthroplasty for management of right proximal humerus  fracture. Components project in the expected position. See operative  summary if further details are needed.    JAMEL HILL MD         SYSTEM ID:  [id number]   XR Shoulder Right Port G/E 2 Views - Grashey/Axillary    Narrative    EXAM: SHOULDER RIGHT PORTABLE TWO OR MORE VIEWS  DATE/TIME: 2/25/2022 4:26 PM    INDICATION: Status post surgery.  COMPARISON: 2/11/2022.      Impression    IMPRESSION: Postop interval placement of a right reverse  ball-and-socket total shoulder arthroplasty for management of a  complex right proximal humerus fracture. Components project in  expected position. Expected postop soft  tissue gas about the right  shoulder. Mild right acromioclavicular joint osteoarthritis. Arterial  calcification.     JAMEL HILL MD         SYSTEM ID:  [id number]

## 2022-02-26 NOTE — UTILIZATION REVIEW
[facility]   Admission Status; Secondary Review Determination   Admission Date: 2/25/2022    Under the authority of the Utilization Management Committee, the utilization review process indicated a secondary review on the above patient. The review outcome is based on review of the medical records, discussions with staff, and applying clinical experience noted on the date of the review.   (x) Outpatient Status Appropriate - This patient does not meet hospital inpatient criteria. If this patient's primary payer is Medicare and was admitted as an inpatient, Condition Code 44 should be used and patient status changed to outpatient recovery.    RATIONALE FOR DETERMINATION   87-year-old male with a history of chronic kidney disease, severe aortic stenosis, coronary artery disease, and atrial fibrillation was admitted yesterday for a reverse total shoulder arthroplasty.  Cardiology was consulted preoperatively given his complex cardiac issues.  He ultimately underwent surgery with no immediate complications. This morning he had some mild nausea and pain issues which have improved throughout the course of the morning.  He also has some degree of urinary retention but nurses will continue to monitor this issue.  In my discussion with his orthopedic team it appears likely that he will discharge later in the day. The surgeon agreed that patient can be observed for bleeding and recover in outpatient setting.   The severity of illness, intensity of service provided, expected LOS and risk for adverse outcome doesn't meet inpatient hospital admission.     The information on this document is developed by the utilization review team in order for the business office to ensure compliance. This only denotes the appropriateness of proper admission status and does not reflect the quality of care rendered.   The definitions of Inpatient Status and Observation Status used in making the determination above are those provided  in the CMS Coverage Manual, Chapter 1 and Chapter 6, section 70.4.     Sincerely,     Shiraz Mcnally DO MPH   Physician Advisor  Utilization Management   [facility].

## 2022-02-26 NOTE — PLAN OF CARE
Occupational Therapy Discharge Summary    Reason for therapy discharge:    Discharged to home.  All goals and outcomes met, no further needs identified.    Progress towards therapy goal(s). See goals on Care Plan in [facility] electronic health record for goal details.  Goals met    Therapy recommendation(s):    Continued therapy is recommended.  Rationale/Recommendations:  follow up OP rehab per discression and recommendation of ortho team - continue HEP 2-3x/day 10 reps; no codmans, NWB, Min A for brace mgmt and supervision for shower transfer as needed w/ use of shower chair .  Continue home exercise program.

## 2022-02-26 NOTE — PROGRESS NOTES
Patient vital signs are at baseline: Yes  Patient able to ambulate as they were prior to admission or with assist devices provided by therapies during their stay:  Yes  Patient MUST void prior to discharge:  No,  Reason:  DTV  Patient able to tolerate oral intake:  Yes  Pain has adequate pain control using Oral analgesics:  Yes   Pt arrived to unit at 1730 with  present. Pt able to speak well, notepad at bedside for staff to communicate when no  present. Pt has left sided visual deficit at baseline. Bruising on R side from fall. Pt having moderate pain well controlled with meds.

## 2022-02-26 NOTE — PROGRESS NOTES
[facility]    Hospitalist Progress Note    Interval History   - Doing well with PT this morning, R shoulder is sore but pain controlled. He is an upholsterer  -  in person used  - Medically stable to discharge. Defer resumption of Pradaxa to orthopedics team    Assessment & Plan   Summary: Shiraz Ingram is a 87 year old male with PMH  deaf male requiring  with PMHx of CKD IIIb, severe aortic stenosis s/p TAVR (12/2020), CAD s/p CABG (2002), permanent atrial fibrillation on chronic anticoagulation with Pradaxa, combined systolic and diastolic heart failure, anemia of chronic disease and humerus fracture with delayed healing who was admitted on 2/25/2022 and underwent right reverse shoulder arthroplasty with ORIF by Dr. Vargas 2/25/22. Hospitalist service was consulted for medical co-management.     Humerus fracture s/p right reverse shoulder arthroplasty with ORIF: Surgery per Dr. Vargas for closed 4 part fracture with delayed healing. General anesthesia used.  ccs. Treated with periop Ancef   - Post operative cares per Orthopedic surgery  - Defer resumption of Pradaxa to surgery     CKD IIIb: Baseline creatinine 1.6-1.8.      CAD, S/P CABG in 2002  Severe aortic stenosis S/P TAVR (12/2020)  Hypertension   Hyperlipidemia  Seen by Dr. Elam 2/25 prior to surgery per Anesthesia request  - Continue PTA hydralazine 112.5 mg TID, Imdur 20 mg po every day, Lopressor 25 mg BID, Simvastatin 40 mg po at bedtime. Hold paramters placed on antihypertensives.      Permanent atrial fibrillation  Hx of MAYA thrombus   Complete heart block and s/p pacemaker:   - Continue Lopressor as above     Chronic combined systolic and diastolic heart failure, not in acute exacerbation:   - Continue Torsemide 10 mg po every day  - I&Os, daily weights      Bilateral deafness:  at bedside.      Anemia of chronic disease  Hx of upper GI bleed: Baseline Hgb  9.0-10.5  - Hgb to assess for post-op anemia   - Continue PTA iron supplementation  - Judicious use of NSAIDs      Asthma, not in acute exacerbation: Resume all PTA inhalers and medications.     DVT Prophylaxis: Pneumatic Compression Devices  Code Status: No CPR- Do NOT Intubate  PT/OT: ordered  Diet: Advance Diet as Tolerated: Regular Diet Adult  Discharge Instruction - Regular Diet Adult      Disposition: Expected discharge today or tomorrow    Titi Byrd MD  Text Page  (7am to 6pm)  -Data reviewed today: I reviewed all new labs and imaging results over the last 24 hours.    Physical Exam   Temp: 97  F (36.1  C) Temp src: Oral BP: (!) 142/58 Pulse: 60   Resp: 18 SpO2: 94 % O2 Device: Nasal cannula Oxygen Delivery: 1 LPM  Vitals:    02/25/22 1113   Weight: 71.2 kg (157 lb)     Vital Signs with Ranges  Temp:  [97  F (36.1  C)-97.2  F (36.2  C)] 97  F (36.1  C)  Pulse:  [52-70] 60  Resp:  [9-25] 18  BP: (114-151)/(47-72) 142/58  SpO2:  [91 %-100 %] 94 %  I/O last 3 completed shifts:  In: 1400 [I.V.:1100]  Out: 310 [Urine:310]  O2 requirements: none    Constitutional: Elderly male in NAD  HEENT: Eyes nonicteric, oral mucosa moist  Cardiovascular: RRR, normal S1/2, no m/r/g  Respiratory: CTAB, no wheezing or crackles  Vascular: No LE pitting edema, noted distal pedal pulses  GI: Normoactive bowel sounds, nontender, nondistended  Skin/Integumen: R shoulder dressings c/d/i  Neuro/Psych: Appropriate affect and mood. A&Ox3, moves all extremities    Medications     lactated ringers 100 mL/hr at 02/25/22 1751       acetaminophen  975 mg Oral Q8H     aspirin  81 mg Oral Daily     budesonide  0.5 mg Nebulization 2 times daily     dabigatran ANTICOAGULANT  150 mg Oral BID     ferrous sulfate  325 mg Oral BID w/meals     hydrALAZINE  12.5 mg Oral TID     isosorbide mononitrate  30 mg Oral Daily     metoprolol tartrate  25 mg Oral BID     montelukast  10 mg Oral At Bedtime     pantoprazole  40 mg Oral BID AC      senna-docusate  1 tablet Oral BID     simvastatin  40 mg Oral At Bedtime     sodium chloride (PF)  3 mL Intracatheter Q8H     torsemide  10 mg Oral Daily       Data   Recent Labs   Lab 02/26/22  0739 02/26/22  0551 02/25/22  1356 02/25/22  1053 02/23/22  1412 02/23/22  1411   WBC 5.1  --  3.0*  --  3.9*  --    HGB 8.8*  --  8.0*  --  9.2*  --    MCV 71*  --  68*  --  67*  --      --  182  --  230  --    *  --   --   --   --  137   POTASSIUM 5.0  --   --  4.7  --  5.4*   CHLORIDE 103  --   --   --   --  106   CO2 24  --   --   --   --  23   BUN 46*  --   --   --   --  53*   CR 1.58*  --   --  1.66*  --  1.83*   ANIONGAP 5  --   --   --   --  8   AYALA 8.8  --   --   --   --  9.4   * 106*  --   --   --  100*   ALBUMIN  --   --   --   --   --  3.5   PROTTOTAL  --   --   --   --   --  7.3   BILITOTAL  --   --   --   --   --  0.7   ALKPHOS  --   --   --   --   --  72   ALT  --   --   --   --   --  22   AST  --   --   --   --   --  16       Imaging:   Recent Results (from the past 24 hour(s))   XR Surgery LATRICE Fluoro L/T 5 Min w Stills    Narrative    SURGERY C-ARM FLUOROSCOPY LESS THAN FIVE MINUTES WITH STILLS    2/25/2022 3:30 PM     HISTORY: Right Total Shoulder    NUMBER OF IMAGES ACQUIRED: 3    FLUOROSCOPY TIME: 0.4 minute      Impression    IMPRESSION: Three intraoperative fluoroscopic spot images are provided  for review during placement of right reverse ball-and-socket total  shoulder arthroplasty for management of right proximal humerus  fracture. Components project in the expected position. See operative  summary if further details are needed.    JAMEL HILL MD         SYSTEM ID:  [id number]   XR Shoulder Right Port G/E 2 Views - Grashey/Axillary    Narrative    EXAM: SHOULDER RIGHT PORTABLE TWO OR MORE VIEWS  DATE/TIME: 2/25/2022 4:26 PM    INDICATION: Status post surgery.  COMPARISON: 2/11/2022.      Impression    IMPRESSION: Postop interval placement of a right reverse  ball-and-socket  total shoulder arthroplasty for management of a  complex right proximal humerus fracture. Components project in  expected position. Expected postop soft tissue gas about the right  shoulder. Mild right acromioclavicular joint osteoarthritis. Arterial  calcification.     JAMEL HILL MD         SYSTEM ID:  [id number]

## 2022-02-26 NOTE — PLAN OF CARE
DATE & TIME: 02/24/2022 3240-9122  Cognitive Concerns/ Orientation: AA/Ox4; Deaf but communication accommodations implemented    BEHAVIOR & AGGRESSION TOOL COLOR: Green, calm and cooperative    CIWA SCORE: N/A  ABNL VS/O2: VSS, except on 1L NC. SpO2>97%  TELEMETRY RHYTHM: N/A  PAIN MANAGMENT: Pain is unmanaged towards the end of shift.  Around 0430, pt PIV infiltrated.  5 attempts were made but no access obtained. Unable to give IV pain meds. Oxycodone 10 mg given x 1 with no relief. Nonpharmacologic modalities offered but declined.   DIET: Reg diet this am; daily BS checked; 1 episode of emesis d/t drinking orange juice   BOWEL/BLADDER: Continent to BR  ABNL LAB/BG: pending AM labs  DRAIN/DEVICES: No IV access  SKIN: Flaky, dry, dry patches scattered and ecchymosis.   MOBILITY: Ast x 1 with gb, side step walker  D/C DAY/GOALS/PLACE: pending medical clearance   OTHER IMPORTANT INFO:   - OT eval

## 2022-02-26 NOTE — PROGRESS NOTES
[facility]      OUTPATIENT OCCUPATIONAL THERAPY  EVALUATION  PLAN OF TREATMENT FOR OUTPATIENT REHABILITATION  (COMPLETE FOR INITIAL CLAIMS ONLY)  Patient's Last Name, First Name, M.I.  YOB: 1934  Shiraz Ingram                          Provider's Name  [facility] Medical Record No.  [id number]                               Onset Date:  02/25/22   Start of Care Date:  (P) 02/26/22     Type:     ___PT   _X_OT   ___SLP Medical Diagnosis:  (P) R TSA                        OT Diagnosis:  (P) impaired ADL/IADL   Visits from SOC:  1   _________________________________________________________________________________  Plan of Treatment/Functional Goals    Planned Interventions: (P) ADL retraining, ROM, strengthening, transfer training, home program guidelines, progressive activity/exercise, risk factor education   Goals: See Occupational Therapy Goals on Care Plan in [facility] electronic health record.    Therapy Frequency: (P) One time eval and treatment  Predicted Duration of Therapy Intervention: (P) 02/26/22  _________________________________________________________________________________    I CERTIFY THE NEED FOR THESE SERVICES FURNISHED UNDER        THIS PLAN OF TREATMENT AND WHILE UNDER MY CARE     (Physician co-signature of this document indicates review and certification of the therapy plan).                Certification date from: (P) 02/26/22, Certification date to: (P) 02/27/22    Referring Physician: Stormy Stanton PA-C            Initial Assessment        See Occupational Therapy evaluation dated (P) 02/26/22 in Epic electronic health record.

## 2022-02-26 NOTE — PROGRESS NOTES
Pt discharged at 1530 with son and  present, going home with son. Received medications and belongings, went over discharge paperwork, explained stoplight tool.

## 2022-02-26 NOTE — PROGRESS NOTES
"   02/26/22 1000   Quick Adds   Type of Visit Initial Occupational Therapy Evaluation       Present yes   Language sign language    Living Environment   People in Home child(perla), adult   Current Living Arrangements house   Living Environment Comments 2 adult children 3 BLADIMIR from garage, all needs met on main floor    Self-Care   Usual Activity Tolerance excellent   Current Activity Tolerance moderate   Equipment Currently Used at Home none   Fall history within last six months yes   Number of times patient has fallen within last six months 1   Activity/Exercise/Self-Care Comment Pt is IND at baseline for all ADL tasks, lives with sons but does all tasks on own    Instrumental Activities of Daily Living (IADL)   IADL Comments Pt drives and works on a [employer] as an [employer] and has done this for 50+ yrs, joking w/ therapist during evaluation if he can return to work tomorrow.  Pt initially reported he would be alone during the day - however upon son arrival he corrected to stated he will have 24/7 supervision for first few days until increased ind    General Information   Onset of Illness/Injury or Date of Surgery 02/25/22   Referring Physician Stormy Stanton PA-C   Patient/Family Therapy Goal Statement (OT) to go home today - communicated via pt and     Additional Occupational Profile Info/Pertinent History of Current Problem Per chart review: \"87-year-old male with a history of chronic kidney disease, severe aortic stenosis, coronary artery disease, and atrial fibrillation was admitted yesterday for a reverse total shoulder arthroplasty. 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   Sensory   Sensory Comments increased " pain w/ movement   Pain Assessment   Patient Currently in Pain Yes, see Vital Sign flowsheet   Range of Motion Comprehensive   Comment, General Range of Motion LUE WFL   Strength Comprehensive (MMT)   Comment, General Manual Muscle Testing (MMT) Assessment Generally strong LUE, RUE limited s/p did not assess mmt    Coordination   Coordination Comments wfl   Bed Mobility   Comment (Bed Mobility) SBA   Transfers   Transfer Comments SBA-CGA   Balance   Balance Comments intact   Clinical Impression   Criteria for Skilled Therapeutic Interventions Met (OT) Yes, treatment indicated   OT Diagnosis impaired ADL/IADL   OT Problem List-Impairments impacting ADL problems related to;activity tolerance impaired;mobility;strength;post-surgical precautions   Assessment of Occupational Performance 5 or more Performance Deficits   Identified Performance Deficits dressing, bathing, home mgmt, activity tolerance, grooming/hygiene   Planned Therapy Interventions (OT) ADL retraining;ROM;strengthening;transfer training;home program guidelines;progressive activity/exercise;risk factor education   Clinical Decision Making Complexity (OT) low complexity   Anticipated Equipment Needs Upon Discharge (OT)   (shower chair)   Risk & Benefits of therapy have been explained evaluation/treatment results reviewed;care plan/treatment goals reviewed;risks/benefits reviewed;current/potential barriers reviewed;participants voiced agreement with care plan;participants included;patient;son   OT Discharge Planning   OT Discharge Recommendation (DC Rec)   (defer to ortho)   OT Rationale for DC Rec Pt expected to be SBA-Min A for all ADL/IADL tasks. Mod I for shower transfer, use of shower chair - pt has and has used in past; Min A for brace mgmt. Encourage HEP of shoulder (no codmans) Pt will hae 24/7 supervision upon dishcarge   OT Brief overview of current status SBA mobility, no AD   Therapy Certification   Start of Care Date 02/26/22   Certification  date from 02/26/22   Certification date to 02/27/22   Medical Diagnosis R TSA   Total Evaluation Time (Minutes)   Total Evaluation Time (Minutes) 8   OT Goals   Therapy Frequency (OT) One time eval and treatment   OT Predicated Duration/Target Date for Goal Attainment 02/26/22   OT Goals Upper Body Dressing;Lower Body Dressing;Toilet Transfer/Toileting;OT Goal 1   OT: Upper Body Dressing Modified independent;Minimal assist;within precautions;Goal Met   OT: Lower Body Dressing Modified independent;Minimal assist;within precautions;Goal Met   OT: Toilet Transfer/Toileting Modified independent;cleaning and garment management;toilet transfer;within precautions;Goal Met   OT: Goal 1 Pt will express understanding of shoulder HEP (x10 reps of each ex 2-3x/day).

## 2022-02-28 NOTE — OP NOTE
Procedure Date: 02/25/2022    SURGEON:  Wiley Vargas MD    ASSISTANT:  BLACK Glover, OPA-C    PREOPERATIVE DIAGNOSIS:  Right shoulder 4-part proximal humerus fracture, markedly displaced in an osteopenic patient.    POSTOPERATIVE DIAGNOSIS:  Right shoulder 4-part proximal humerus fracture, markedly displaced in an osteopenic patient.    PROCEDURE PERFORMED:  Reverse Shoulder arthroplasty with Open Reduction and internal fixation of the proximal humerus tuberosities.    ANESTHESIA:  General with interscalene block.    ESTIMATED BLOOD LOSS:  225 mL    SPECIMENS:  Resected head not sent.    DRAINS:  None.    COMPLICATIONS:  None.    IMPLANTS:  Tornier 6B Aequalis Ascend long Flex stem with high offset metaphyseal tray, 6 mm bearing with a 25 mm glenosphere baseplate, 35 mm central screw, 4 peripheral screws and a 39 eccentric glenosphere.    NARRATIVE:  I discussed the risks, benefits, possible complication, alternatives with the patient.  He voiced understanding and gives his consent.  All communication was done with the aid of an . Previous discussion was done in more depth in the office. We discussed risks, benefits, possible complications and alternatives, he wished to proceed.     He was brought to the OR and placed in supine position.  An arterial line was started.  A magnet was placed over the spacer and he was prepped and draped in sterile fashion.  Following this, a standard deltopectoral incision was made, carried down through skin and soft tissue.  Electrocautery was used for hemostasis.  Cephalic vein was identified and taken laterally with the deltoid and this was protected throughout the case and noted to be intact at closure.  Dissection was then carried down to the clavipectoral fascia, which was incised.  Conjoined tendon was identified and this was retracted medially to allow approach to the anterior shoulder.  There is a hematoma in this area.  Opening the  bursa  allows significant hematoma to be evacuated.  Fracture fragments were noted to be displaced, particularly posteriorly.  Anteriorly, the subscap fragment and greater tuberosity fragment appeared to be well positioned.  Traction suture was placed through the bone tendon interface of the subscapularis and then this fragment was then further inspected.  It was noted that the classic fracture pattern to the bicipital groove has not occurred in this patient.  There is a large chunk of the greater tuberosity attached to the lesser tuberosity.  The articular segment can be seen completely rotated approximately 150 degrees facing almost directly laterally, and this was removed.  This allowed for manipulation of this. It was felt that release of a portion of the superior cuff was necessary to allow mobilization of the subscapularis and this was done.      Rotator interval was then split and this allows this large fragment with the lesser tuberosity and a portion of the greater tuberosity attached to it to be brought anteriorly, allowing exposure of the shaft of the humerus at this posterior fragment.  The posterior fragment is a much smaller fragment of the greater tuberosity due to this fracture pattern.  This can be grasped using a Elia retractor, pulled forward.  Sutures were placed through this to control this. Copious irrigation was performed, removing clot and debris from this area and allowing visualization of the proximal humerus.     Attention was turned to the glenoid.  Biceps was tenotomized.  This was very hypertrophic and abnormal. This was released, stump was resected and the labrum was resected in a 360-degree arc around the glenoid.  This was felt that a reverse shoulder is indicated as expected from his MRI and his exam.  Once this has been cleared, area for attachment of the glenosphere base plate was chosen, placed on the lowest edge of the glenoid.  There was actually good glenoid stock here for  fixation, taking into account is an 87-year-old status.  The guide was placed.  Pin was passed.  A finger was placed anteriorly along the scapular neck to check for depth of insertion.  This was approximately 37 mm.  Following this, the reamer was placed over this and it was reamed down to get full contact without removing significant structural bone.  He does have a fairly thin subchondral plate at the lowest aspect and a portion of this is resected.  The majority of the subchondral plate is intact for stability of the baseplate.  Baseplate was chosen using a 25 mm baseplate.  A 35 central screw was chosen.  This was drilled and prepared.  This was inserted and removed to make sure this was sit down nicely and the box was not obstructed.  Following this, a screw was applied and this was seated.  Central screw was fully tightened, showing excellent fixation.  Peripheral screws were placed, showing excellent length of fixation  superior to inferior with lesser screw lengths anterior and posterior.  This appears to be quite stable.  Following this, a 39 glenosphere was applied with a plan to have this in place and build to it to match the balance of the soft tissues.    Once this has been placed, then attention was turned to the proximal humerus.  This was delivered from the incision.  Copious irrigation was performed to remove any clot and hematoma from this area.  Canal was copiously cleaned and then the sounds were used.  It was felt that a 5 or 6 would likely be the size of the stem, so 5 and 6 broaches were placed for this.  It was felt the 6 had better stability rotationally as well as fit distally. Tuberosity was then repositioned around this implant once the high offset tray and 6 bearing were applied.  This was reduced.  The tuberosities were reduced around this and the C-arm was brought in to ascertain an appropriate height was restored and the tuberosities will reduce in a nearly anatomic or anatomic  position.  This was checked and felt to be in excellent position with this 20 degrees of retroversion.  The height of the implant is 20 mm above the most proximal extent to the anterior fragment.     This was measured and marked and following this, attention was turned to redislocating this, removing the trial and placing sutures around the tuberosities for later cerclage. Control sutures were then placed anterior, posterior, superior.  Two sutures were also placed through the drill holes in the humeral shaft.  This allows us to have 3 cerclage sutures going around the implant and through the tuberosities.  There will be box stitches connecting the anterior and posterior cuff bone interfaces and then we will have the 2 figure-of-eight sutures from the shaft over the top.  The final implant was chosen using the 6B.  Cement was mixed.  This was applied to the stem in a doughy state.  This was then inserted while maintaining appropriate rotational position of this graft.  Excess cement was cleared, making sure to keep the edges of the fracture clear of cement to optimize bony healing.  This was done in 20 degrees of retroversion 2 cm above the anterior spike as previously measured.  This was allowed to harden fully and the appropriate metaphyseal tray was applied with maximum superior displacement, a 6 poly was applied.  Prior to reducing this, the 3 cerclage sutures were placed around the medial aspect of the implant and brought through the tuberosities using a large #1 PDS needle and a loop to shuttle these into place.  Following this, the tuberosity was reduced and was felt to be in anatomic position.  C-arm was brought in to ascertain that these are positioned appropriately and, following this, the cerclage sutures were then secured 1 at a time.  Three cerclage sutures were placed, shoulder was reduced nicely in what appears to be near anatomic position underneath the metaphyseal tray.  Following this, the box  sutures were all secured going from this posterior aspect of the cuff to the anterior subscap sutures and securing this to further augment the repair.  Following this, C-arm was used to ascertain appropriate position of the tuberosities.  Additional sutures were placed from the humeral shaft, 1 through the anterior superior tissue and 1 through the posterior and superior tissue to further support these tuberosities and offload some of the intact cuff pull on these large tuberosity fragments.     Final pictures were then obtained in internal, external and neutral positions.  Axillary was checked and shows good positioning.  Copious irrigation performed with dilute Betadine irrigation using 1 liter followed by application of vancomycin into the joint and the subacromial and subdeltoid space after checking for good hemostasis.  Following this, the deltopectoral interval was closed with 0 Vicryl interrupted sutures, subcutaneous tissues with 3-0 Vicryl, the skin with a 3-0 Stratafix.  Exofin dressing was applied.     The patient went to recovery in good condition.  He will have a standard reverse fracture rehab course, which is very protracted and cautious rehab protocol, particularly taking into account his advanced age and less than ideal bone quality.  The patient tolerated procedure well and went to recovery in good condition.      Wiley Vargas MD        D: 2022   T: 2022   MT: [id number]/DCQA    Name:     SRIKANTH OBANDO  MRN:      [id number]        Account:        [id number]   :      1934           Procedure Date: 2022     Document: [id number]

## 2022-03-11 DIAGNOSIS — I50.9 ACUTE ON CHRONIC CONGESTIVE HEART FAILURE, UNSPECIFIED HEART FAILURE TYPE (H): Primary | ICD-10-CM

## 2022-03-14 DIAGNOSIS — J45.40 MODERATE PERSISTENT ASTHMA WITHOUT COMPLICATION: ICD-10-CM

## 2022-03-16 RX ORDER — MONTELUKAST SODIUM 10 MG/1
1 TABLET ORAL DAILY
Qty: 90 TABLET | Refills: 1 | Status: SHIPPED | OUTPATIENT
Start: 2022-03-16 | End: 2022-12-13

## 2022-03-16 NOTE — TELEPHONE ENCOUNTER
Patient scheduled with Dr. Rodriguez on 4/26/22.    Prescription approved per [facility] Refill Protocol.  KATHLEEN VelezN, RN  [facility]

## 2022-03-21 ENCOUNTER — OFFICE VISIT (OUTPATIENT)
Dept: CARDIOLOGY | Facility: CLINIC | Age: 87
End: 2022-03-21
Attending: PHYSICIAN ASSISTANT
Payer: COMMERCIAL

## 2022-03-21 VITALS
HEART RATE: 78 BPM | DIASTOLIC BLOOD PRESSURE: 64 MMHG | WEIGHT: 158 LBS | BODY MASS INDEX: 23.95 KG/M2 | SYSTOLIC BLOOD PRESSURE: 138 MMHG | OXYGEN SATURATION: 98 % | HEIGHT: 68 IN

## 2022-03-21 DIAGNOSIS — I50.42 CHRONIC COMBINED SYSTOLIC AND DIASTOLIC HEART FAILURE (H): ICD-10-CM

## 2022-03-21 PROCEDURE — 99215 OFFICE O/P EST HI 40 MIN: CPT | Performed by: PHYSICIAN ASSISTANT

## 2022-03-21 RX ORDER — MECLIZINE HCL 12.5 MG 12.5 MG/1
12.5 TABLET ORAL 3 TIMES DAILY PRN
COMMUNITY
End: 2022-04-26

## 2022-03-21 NOTE — PATIENT INSTRUCTIONS
Today, we discussed the following:   - Medication changes:      Take an extra 5 mg of torsemide for the next 3 days, then go back to your 10 mg daily dose.   - Follow up:     Please schedule a follow-up visit with Dr. Field in 3 months, with an echocardiogram to reassess your aortic valve prior, and labs.    Please, remember to continue the followin.  Weigh yourself daily. Call if your weight is up > than 2 pounds in one day, or 5 pounds in one week; if you feel more short of breath or have worsening swelling in your legs or abdomen.  2.  Eat a low sodium diet (less than 2,000mg or 2g daily). If you eat less salt, you will retain less fluid.   3.  Avoid alcohol, as this can worsen heart failure.   4.  Avoid NSAIDs as able (For example, Ibuprofen / aleve / advil / naprosen / diclofenac).    Please call [facility] nurse for any questions or concerns Mon-Fri 8am-4pm:   [phone]  For concerns after hours:   [phone] option 2   Scheduling phone number:   [phone]    Thank you for visiting with us today.   Amee Rocha PA-C  ______________________________________________________________

## 2022-03-21 NOTE — PROGRESS NOTES
Cardiology Clinic Progress Note    Shiraz Ingram MRN# [id number]   YOB: 1934 Age: 87 year old   Primary cardiologist: Dr. Field         Assessment and Plan:     In summary, Shiraz Ingram presents today for a [facility] clinic follow up visit.    1. 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 Currently appears just slightly volume up with some increased peripheral edema at a weight of 158 lbs. This is in the setting of inactivity after shoulder surgery.   2.  CKD-III; with a creatinine at baseline ~1.6.   3.  Hypertension, controlled.   4.  Status post TAVR in July 2020, with stable function and mild amanda-valvular AI on 6/2021 TTE.  5.  Chronic atrial fibrillation with history of MAYA thrombus, on anticoagulation with Pradaxa.  6. CAD, s/p CABG. Denies angina.   7. Hyperlipidemia, well-controlled as of 9/2021.    Plan:  - He'll take an extra 5 mg of torsemide for the next three days, then return to 10 mg daily. Otherwise, he'll continue his current cardiac regimen.     Follow-up:  - With Dr. Field in 3 months, TTE + labs (BMP, proBNP, Hgb) prior.     It was a pleasure seeing Shiraz, as always!        History of Presenting Illness:      Shiraz Ingram is a pleasant 87 year old patient who presents today with an  for a follow up visit in the [facility] clinic.    He has a pertinent medical history of the following -  # Chronic HFpEF in the setting of VHD  # PHTN  # Severe aortic stenosis s/p TAVR 7/2020, complicated by CHB requiring PPM implant  # History of stable CAD s/p CABG in 2002 [LIMA -LAD, VG-OM and the RCA], with known moderate touchdown lesion of the right PDA branch of the [id number] Y-graft; patent LIMA to LAD, vein graft to the OM, and right posterior lateral Y graft branch as of last angiogram in 4/2020.   # CAF, with hx of MAYA thrombus, anticoagulated.  # Chronic iron deficient anemia, followed by Dr. Morris.  # Hx of upper GI bleed in May 2020, treated with cautery, on PPI.  #  HTN  # Asthma  # CKD-III, baseline creatinine runs around 1.3-1.5.  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  He is excellent with ASL.    Unfortunately, Shiraz had a rough 2020 with multiple admissions. In July 2020, he ununderwent TAVR with a 29 mm Medtronic valve with successful reduction valve gradients to 5. His procedure was complicated by CHB, requiring PPM implantation. His diuretics were stopped. Several days post-discharge, he returned to the hospital with acute HFpEF. TTE was stable. He was diuresed and placed back on oral furosemide. When he saw Daksha Smith in follow-up in August, he appeared stable. He was then admitted end of September with pneumonia and RD, and his furosemide was again stopped in the hospital. This resulted in another admission for acute HFpEF in October. He was again diuresed and ultimately discharged on torsemide 10 mg daily. His torsemide was reduced in November due to RD and he unfortunately was readmitted with heart failure requiring diuresis.  Discharged on torsemide 15 mg daily. A good weight for him is felt to be around 140 pounds or less.    I then referred him to meet Dr. Tierney for consideration of cardioMEMs. He ultimately met dennise in March 2021, but it was felt not necessary at that time, as he'd recently been stable.     Since then, he has had some issues with acute kidney injury with a creatinine > 2, which required us to stop his lisinopril completely and up titrate isosorbide.    Echocardiogram was last obtained in June of this year, and was stable from his prior, showing EF of 45-50%, mild RV dilation and moderate dysfunction, TAVR valve had normal gradients with trace to mild AI with 2 small jets of perivalvular regurgitation, and moderate pulmonary hypertension with an RVSP of 41.  He saw Dr. Field after that, at which time his weight was up at 151 lbs. Creatinine was 1.7 and  "BUN was 54. He was felt to be stable, no changes were made, and he was asked to return in 6 months. Since that time, his torsemide was reduced from 15 to 10 mg daily due to evidence of persistent pre-renal azotemia on labs with a creatinine up to 2.  His most recent set of labs was obtained end of February, showing a creatinine of 1.6.    He underwent shoulder surgery last month, after a mechanical fall, and he did well with this, starts rehab tomorrow. He did have to hold his pradaxa briefly amanda-operatively, but is now back on. Fortunately, he tells me he feels great from a cardiovascular standpoint.  No CVA/TIA sxs. He continues to work at his business at age 87.  He denies shortness of breath, orthopnea, palpitations, near syncope, chest pain. His clinic weight is up 8 pounds over the past month, (158 in clinic), however just 153 at home, which is only 3 lbs above his baseline. I suspect this is a combination of a little fluid retention and his sling weight as well. He does have some increased peripheral edema on exam, but otherwise denies heart failure symptoms.   His last device check in January showed 35% V pace with an underlying rhythm of atrial fibrillation with no ventricular arrhythmias.  He has 14 years remaining on his battery status.           Review of Systems:     12-pt ROS is negative except for as noted in the HPI.          Physical Exam:     Vitals: /64   Pulse 78   Ht 1.727 m (5' 8\")   Wt 71.7 kg (158 lb)   SpO2 98%   BMI 24.02 kg/m    Wt Readings from Last 10 Encounters:   03/21/22 71.7 kg (158 lb)   02/25/22 71.2 kg (157 lb)   02/23/22 72.6 kg (160 lb)   01/25/22 68.5 kg (151 lb)   01/12/22 68 kg (150 lb)   12/08/21 67.6 kg (149 lb)   09/02/21 67.1 kg (148 lb)   06/22/21 70.8 kg (156 lb)   06/08/21 68.8 kg (151 lb 9.6 oz)   03/22/21 66.2 kg (146 lb)       Constitutional:  Patient is pleasant, alert, cooperative, and in NAD.  HEENT:  NCAT. PERRLA. EOM's intact.   Neck:  CVP appears " normal. No carotid bruits.   Pulmonary: Normal respiratory effort. CTAB.   Cardiac: RRR, normal S1/S2, no S3/S4, no murmur or rub.   Abdomen:  Non-tender abdomen, no hepatosplenomegaly appreciated.   Vascular: Pulses in the upper and lower extremities are 2+ and equal bilaterally.  Extremities: 1+ ankle edema bilaterally.  Skin:  No rashes or lesions appreciated.   Neurological:  No gross motor or sensory deficits.   Psych: Appropriate affect.        Data:     Labs reviewed:  Recent Labs   Lab Test 01/12/22  1028 12/08/21  1102 09/02/21  0851 03/15/21  1254 03/01/21  1209 09/30/20  1024 06/01/20  0907 05/13/20  0537 06/12/19  0818   LDL  --   --  57  --   --   --  54  --  74   HDL  --   --  43  --   --   --  48  --  46   NHDL  --   --  67  --   --   --  71  --  90   CHOL  --   --  110  --   --   --  119  --  136   TRIG  --   --  51  --   --   --  87  --  82   TSH  --   --   --  1.68  --   --   --   --   --    NTBNP 8,937* 3,659* 3,764*  --   --    < >  --   --   --    IRON  --   --   --   --  82   < >  --    < > 117   FEB  --   --   --   --  286   < >  --    < > 312   IRONSAT  --   --   --   --  29   < >  --    < > 38   RJ  --   --   --   --  495*   < >  --    < > 602*    < > = values in this interval not displayed.       Lab Results   Component Value Date    WBC 5.1 02/26/2022    WBC 3.2 (L) 06/07/2021    RBC 4.01 (L) 02/26/2022    RBC 4.89 06/07/2021    HGB 8.8 (L) 02/26/2022    HGB 10.3 (L) 06/07/2021    HCT 28.6 (L) 02/26/2022    HCT 33.5 (L) 06/07/2021    MCV 71 (L) 02/26/2022    MCV 69 (L) 06/07/2021    MCH 21.9 (L) 02/26/2022    MCH 21.1 (L) 06/07/2021    MCHC 30.8 (L) 02/26/2022    MCHC 30.7 (L) 06/07/2021    RDW 19.8 (H) 02/26/2022    RDW 16.9 (H) 06/07/2021     02/26/2022     (L) 06/07/2021       Lab Results   Component Value Date     (L) 02/26/2022     06/07/2021    POTASSIUM 5.0 02/26/2022    POTASSIUM 4.3 06/07/2021    CHLORIDE 103 02/26/2022    CHLORIDE 104 06/07/2021    CO2  24 02/26/2022    CO2 27 06/07/2021    ANIONGAP 5 02/26/2022    ANIONGAP 5 06/07/2021     (H) 02/26/2022     (H) 02/26/2022    GLC 97 06/07/2021    BUN 46 (H) 02/26/2022    BUN 54 (H) 06/07/2021    CR 1.58 (H) 02/26/2022    CR 1.72 (H) 06/07/2021    GFRESTIMATED 42 (L) 02/26/2022    GFRESTIMATED 35 (L) 06/07/2021    GFRESTBLACK 41 (L) 06/07/2021    AYALA 8.8 02/26/2022    AYALA 8.9 06/07/2021      Lab Results   Component Value Date    AST 16 02/23/2022    AST 25 03/15/2021    ALT 22 02/23/2022    ALT 38 03/15/2021       No results found for: A1C    Lab Results   Component Value Date    INR 1.51 (H) 03/15/2021    INR 1.21 (H) 07/13/2020           Problem List:     Patient Active Problem List   Diagnosis     Benign neoplasm of colon     Localized osteoarthrosis, lower leg     Allergic rhinitis     Moderate persistent asthma     Osteoporosis     Hypertension goal BP (blood pressure) < 140/90     Hyperlipidemia with target LDL less than 100     CKD (chronic kidney disease) stage 3, GFR 30-59 ml/min (H)     Advanced directives, counseling/discussion     Iron deficiency anemia     Vitamin B 12 deficiency     Health Care Home     Acute respiratory failure (H)     Acute on chronic congestive heart failure, unspecified heart failure type (H)     Severe aortic stenosis -- S/P TAVR 12/2020     Coronary artery disease involving native coronary artery of native heart without angina pectoris     CAD, S/P CABG in 2002     Hyperkalemia     GI bleed     Pneumonia     Persistent Afib -- on Pradaxa      Thrombus of left atrial appendage     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     Aortic stenosis, severe     Acute congestive heart failure, unspecified heart failure type (H)     Acute respiratory failure with hypoxia (H)     Hx of Esophageal ulcer -- Cauterized 5/24/20     Anemia of chronic disease (baseline Hgb 9.0-10.5)     Chronic combined systolic and diastolic heart failure (H)     Pain in joint involving ankle and foot,  right     Thrombocytopenia, unspecified (H)     Bilateral deafness     Postoperative state           Medications:     Current Outpatient Medications   Medication Sig Dispense Refill     acetaminophen (TYLENOL) 500 MG tablet Take 1,000 mg by mouth 2 times daily       albuterol (PROAIR HFA/PROVENTIL HFA/VENTOLIN HFA) 108 (90 Base) MCG/ACT inhaler INHALE 1 TO 2 PUFFS BY MOUTH EVERY 4 TO 6 HOURS AS NEEDED 18 g 2     aspirin (ASA) 81 MG EC tablet Take 81 mg by mouth daily       budesonide (PULMICORT) 0.5 MG/2ML nebulizer solution Take 2 mLs (0.5 mg) by nebulization 2 times daily 120 mL 0     calcium carbonate 600 mg-vitamin D 400 units (CALTRATE) 600-400 MG-UNIT per tablet Take 1 tablet by mouth daily       dabigatran ANTICOAGULANT (PRADAXA) 150 MG capsule Take 1 capsule (150 mg) by mouth 2 times daily 180 capsule 3     ferrous sulfate (IRON) 325 (65 Fe) MG tablet TAKE 1 TABLET(325 MG) BY MOUTH TWICE DAILY 180 tablet 0     hydrALAZINE (APRESOLINE) 25 MG tablet Take 0.5 tablets (12.5 mg) by mouth 3 times daily 135 tablet 3     ipratropium - albuterol 0.5 mg/2.5 mg/3 mL (DUONEB) 0.5-2.5 (3) MG/3ML nebulization Inhale 1 vial (3 mLs) into the lungs 4 times daily 360 mL 11     isosorbide mononitrate (IMDUR) 30 MG 24 hr tablet Take 1 tablet (30 mg) by mouth daily 90 tablet 1     magnesium chloride (MAG64) 535 (64 Mg) MG TBEC CR tablet Take 1 tablet (535 mg) by mouth 2 times daily 180 tablet 3     meclizine (ANTIVERT) 12.5 MG tablet Take 12.5 mg by mouth 3 times daily as needed for dizziness       melatonin 3 MG tablet Take 1 tablet (3 mg) by mouth nightly as needed for sleep 30 tablet 0     metoprolol tartrate (LOPRESSOR) 25 MG tablet TAKE 1 TABLET BY MOUTH TWICE A  tablet 1     montelukast (SINGULAIR) 10 MG tablet Take 1 tablet (10 mg) by mouth daily 90 tablet 1     multivitamin, therapeutic (THERA-VIT) TABS tablet Take 1 tablet by mouth every evening        nitroGLYcerin (NITROSTAT) 0.4 MG sublingual tablet For chest  pain place 1 tablet under the tongue every 5 minutes for 3 doses. If symptoms persist 5 minutes after 1st dose call 911. 12 tablet 0     pantoprazole (PROTONIX) 40 MG EC tablet TAKE 1 TABLET BEFORE MEALS TWICE A  tablet 1     senna-docusate (SENOKOT-S/PERICOLACE) 8.6-50 MG tablet Take 1 tablet by mouth 2 times daily 60 tablet 0     simvastatin (ZOCOR) 40 MG tablet Take 1 tablet (40 mg) by mouth At Bedtime 90 tablet 3     torsemide (DEMADEX) 5 MG tablet Take 2 tablets (10 mg) by mouth daily 180 tablet 3           Past Medical History:     Past Medical History:   Diagnosis Date     Allergic rhinitis, cause unspecified      Anemia, unspecified      Aortic stenosis S/P TAVR      Benign neoplasm of colon 1999    Ademonatous polyp     CHF 2nd to TAVR -- S/P Pacemaker 12/2020     CKD (chronic kidney disease) stage 3, GFR 30-59 ml/min (H) 05/12/2011     Community acquired pneumonia 9/29/2020     Coronary atherosclerosis of unspecified type of vessel, native or graft     s/p CABG 2002     Esophageal ulcer w UGI bleed 05/24/2020    Had EGD adn caurtery 5/24/20, colonoscopy with diverticulosis then     Hyperlipidemia LDL goal < 100      Hypertension goal BP (blood pressure) < 140/90      Localized osteoarthrosis not specified whether primary or secondary, lower leg     left knee     Mild persistent asthma without complication 1/11/2016     Moderate persistent asthma      Persistent atrial fibrillation (H)      Unspecified hearing loss      Past Surgical History:   Procedure Laterality Date     CARDIAC SURGERY       COLONOSCOPY N/A 5/26/2020    Procedure: COLONOSCOPY;  Surgeon: Ignacio Fournier MD;  Location:  GI     CV ANGIOGRAM CORONARY GRAFT N/A 4/24/2020    Procedure: Angiogram Coronary Graft;  Surgeon: Addison Willard MD;  Location: [facility] CARDIAC CATH LAB     CV CORONARY ANGIOGRAM N/A 4/24/2020    Procedure: Coronary Angiogram;  Surgeon: Addison Willard MD;  Location: [facility] CARDIAC CATH LAB      CV RIGHT HEART CATH MEASUREMENTS RECORDED N/A 2020    Procedure: Right Heart Cath;  Surgeon: Addison Willard MD;  Location:  HEART CARDIAC CATH LAB     CV TRANSCATHETER AORTIC VALVE REPLACEMENT N/A 2020    Procedure: Transcatheter Aortic Valve Replacement;  Surgeon: Soraida Monet MD;  Location:  HEART CARDIAC CATH LAB     EP PACEMAKER N/A 7/15/2020    Procedure: EP Pacemaker;  Surgeon: Tommie Sauer MD;  Location:  HEART CARDIAC CATH LAB     HC ESOPHAGOSCOPY, DIAGNOSTIC      Dr. Dwo, lower esophageal ring & mild gastritis     HERNIORRHAPHY INGUINAL Right 2016    Procedure: HERNIORRHAPHY INGUINAL;  Surgeon: Alexis Alexandra MD;  Location: [facility]     LAPAROSCOPIC CHOLECYSTECTOMY      for biliary sludge     REVERSE ARTHROPLASTY SHOULDER Right 2022    Procedure: RIGHT REVERSE SHOULDER ARTHROPLASTY, WITH OPEN REDUCTION INTERNAL FIXATION, WITH NO CUSTOM GUIDE;  Surgeon: Wiley Vargas MD;  Location:  OR     ZZ NONSPECIFIC PROCEDURE      Coronary artery bypass     ZZHC COLONOSCOPY THRU STOMA W BIOPSY/CAUTERY TUMOR/POLYP/LESION      Dr. Dow, Q 5 years     ZZHC COLONOSCOPY THRU STOMA W BIOPSY/CAUTERY TUMOR/POLYP/LESION      Dr. Dow, one adenomatous polyp     Family History   Problem Relation Age of Onset     C.A.D. Father      Cancer Mother         breast cancer,  of pneumonia     Cerebrovascular Disease Son      CABG Son      Family History Negative Child      Family History Negative Sister      Heart Disease Brother      Social History     Socioeconomic History     Marital status:      Spouse name: Kailey     Number of children: 3     Years of education: Not on file     Highest education level: Not on file   Occupational History     Occupation: [employer]     Employer: RETIRED   Tobacco Use     Smoking status: Never Smoker     Smokeless tobacco: Never Used     Tobacco comment: smoked for a week in 20's   Substance  and Sexual Activity     Alcohol use: Not Currently     Alcohol/week: 0.0 standard drinks     Drug use: No     Sexual activity: Yes     Partners: Female   Other Topics Concern      Service No     Blood Transfusions No     Caffeine Concern Not Asked     Occupational Exposure Not Asked     Hobby Hazards Not Asked     Sleep Concern Not Asked     Stress Concern Not Asked     Weight Concern Not Asked     Special Diet Not Asked     Back Care Not Asked     Exercise Yes     Bike Helmet Not Asked     Seat Belt Yes     Self-Exams No     Parent/sibling w/ CABG, MI or angioplasty before 65F 55M? Yes   Social History Narrative    Balanced Diet - Yes    Osteoporosis Preventative measures-  Dairy servings per day: 2 to 3 servings daily    Regular Exercise -  Yes Describe walks 1.5 mile daily     Dental Exam up - YES - Date: 2 years ago    Eye Exam - YES - Date: 1 year ago    Self Testicular Exam -  No, handout given    Do you have any concerns about STD's -  No    Abuse: Current or Past (Physical, Sexual or Emotional)- No    Do you feel safe in your environment - Yes    Guns stored in the home - Yes, locked    Sunscreen used - No    Seatbelts used - Yes    Lipids - YES - Date: 3-09    Glucose -  YES - Date: 3-09    Colon Cancer Screening - Colonoscopy 3-08(date completed)    Hemoccults - UNKNOWN    PSA - YES - Date: 11-07    Digital Rectal Exam - UNKNOWN    Immunizations reviewed and up to date - Yes, td 1-2005, had shingles vaccine    5-28-09  JANEY Patel [facility]                     Social Determinants of Health     Financial Resource Strain: Low Risk      Difficulty of Paying Living Expenses: Not hard at all   Food Insecurity: No Food Insecurity     Worried About Running Out of Food in the Last Year: Never true     Ran Out of Food in the Last Year: Never true   Transportation Needs: No Transportation Needs     Lack of Transportation (Medical): No     Lack of Transportation (Non-Medical): No   Physical Activity: Not on file    Stress: Not on file   Social Connections: Not on file   Intimate Partner Violence: Not on file   Housing Stability: Not on file           Allergies:   Patient has no known allergies.      Amee Rocha PA-C  [facility]  Pager: [phone]    Today's clinic visit entailed:  Review of the result(s) of each unique test - BMP from 2/2022, device interrogation from 1/2022  Ordering of each unique test  Prescription drug management  I spent a total of 40 minutes on the day of the visit.   Time spent doing chart review, history and exam, documentation and further activities per the note  Provider  Link to MDM Help Grid     The level of medical decision making during this visit was of moderate complexity.

## 2022-03-21 NOTE — LETTER
3/21/2022    Dany Rodriguez MD, MD  [address] [place] [id number]    RE: Shiraz Ingram       Dear Colleague,     I had the pleasure of seeing Shiraz Ingram in the [facility].    Cardiology Clinic Progress Note    Shiraz Ingram MRN# [id number]   YOB: 1934 Age: 87 year old   Primary cardiologist: Dr. Field         Assessment and Plan:     In summary, Shiraz Ingram presents today for a [facility] clinic follow up visit.    1. 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 Currently appears just slightly volume up with some increased peripheral edema at a weight of 158 lbs. This is in the setting of inactivity after shoulder surgery.   2.  CKD-III; with a creatinine at baseline ~1.6.   3.  Hypertension, controlled.   4.  Status post TAVR in July 2020, with stable function and mild amanda-valvular AI on 6/2021 TTE.  5.  Chronic atrial fibrillation with history of MAYA thrombus, on anticoagulation with Pradaxa.  6. CAD, s/p CABG. Denies angina.   7. Hyperlipidemia, well-controlled as of 9/2021.    Plan:  - He'll take an extra 5 mg of torsemide for the next three days, then return to 10 mg daily. Otherwise, he'll continue his current cardiac regimen.     Follow-up:  - With Dr. Field in 3 months, TTE + labs (BMP, proBNP, Hgb) prior.     It was a pleasure seeing Shiraz, as always!        History of Presenting Illness:      Shiraz Ingram is a pleasant 87 year old patient who presents today with an  for a follow up visit in the [facility] clinic.    He has a pertinent medical history of the following -  # Chronic HFpEF in the setting of VHD  # PHTN  # Severe aortic stenosis s/p TAVR 7/2020, complicated by CHB requiring PPM implant  # History of stable CAD s/p CABG in 2002 [LIMA -LAD, VG-OM and the RCA], with known moderate touchdown lesion of the right PDA branch of the [id number] Y-graft; patent LIMA to LAD, vein graft to the OM, and right posterior lateral Y graft  marii as of last angiogram in 4/2020.   # CAF, with hx of MAYA thrombus, anticoagulated.  # Chronic iron deficient anemia, followed by Dr. Morris.  # Hx of upper GI bleed in May 2020, treated with cautery, on PPI.  # HTN  # Asthma  # CKD-III, baseline creatinine runs around 1.3-1.5.  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  He is excellent with ASL.    Unfortunately, Shiraz had a rough 2020 with multiple admissions. In July 2020, he ununderwent TAVR with a 29 mm Medtronic valve with successful reduction valve gradients to 5. His procedure was complicated by CHB, requiring PPM implantation. His diuretics were stopped. Several days post-discharge, he returned to the hospital with acute HFpEF. TTE was stable. He was diuresed and placed back on oral furosemide. When he saw Daksha Smith in follow-up in August, he appeared stable. He was then admitted end of September with pneumonia and RD, and his furosemide was again stopped in the hospital. This resulted in another admission for acute HFpEF in October. He was again diuresed and ultimately discharged on torsemide 10 mg daily. His torsemide was reduced in November due to RD and he unfortunately was readmitted with heart failure requiring diuresis.  Discharged on torsemide 15 mg daily. A good weight for him is felt to be around 140 pounds or less.    I then referred him to meet Dr. Tierney for consideration of cardioMEMs. He ultimately met dennise in March 2021, but it was felt not necessary at that time, as he'd recently been stable.     Since then, he has had some issues with acute kidney injury with a creatinine > 2, which required us to stop his lisinopril completely and up titrate isosorbide.    Echocardiogram was last obtained in June of this year, and was stable from his prior, showing EF of 45-50%, mild RV dilation and moderate dysfunction, TAVR valve had normal gradients with  "trace to mild AI with 2 small jets of perivalvular regurgitation, and moderate pulmonary hypertension with an RVSP of 41.  He saw Dr. Field after that, at which time his weight was up at 151 lbs. Creatinine was 1.7 and BUN was 54. He was felt to be stable, no changes were made, and he was asked to return in 6 months. Since that time, his torsemide was reduced from 15 to 10 mg daily due to evidence of persistent pre-renal azotemia on labs with a creatinine up to 2.  His most recent set of labs was obtained end of February, showing a creatinine of 1.6.    He underwent shoulder surgery last month, after a mechanical fall, and he did well with this, starts rehab tomorrow. He did have to hold his pradaxa briefly amanda-operatively, but is now back on. Fortunately, he tells me he feels great from a cardiovascular standpoint.  No CVA/TIA sxs. He continues to work at his business at age 87.  He denies shortness of breath, orthopnea, palpitations, near syncope, chest pain. His clinic weight is up 8 pounds over the past month, (158 in clinic), however just 153 at home, which is only 3 lbs above his baseline. I suspect this is a combination of a little fluid retention and his sling weight as well. He does have some increased peripheral edema on exam, but otherwise denies heart failure symptoms.   His last device check in January showed 35% V pace with an underlying rhythm of atrial fibrillation with no ventricular arrhythmias.  He has 14 years remaining on his battery status.           Review of Systems:     12-pt ROS is negative except for as noted in the HPI.          Physical Exam:     Vitals: /64   Pulse 78   Ht 1.727 m (5' 8\")   Wt 71.7 kg (158 lb)   SpO2 98%   BMI 24.02 kg/m    Wt Readings from Last 10 Encounters:   03/21/22 71.7 kg (158 lb)   02/25/22 71.2 kg (157 lb)   02/23/22 72.6 kg (160 lb)   01/25/22 68.5 kg (151 lb)   01/12/22 68 kg (150 lb)   12/08/21 67.6 kg (149 lb)   09/02/21 67.1 kg (148 lb)   06/22/21 " 70.8 kg (156 lb)   06/08/21 68.8 kg (151 lb 9.6 oz)   03/22/21 66.2 kg (146 lb)       Constitutional:  Patient is pleasant, alert, cooperative, and in NAD.  HEENT:  NCAT. PERRLA. EOM's intact.   Neck:  CVP appears normal. No carotid bruits.   Pulmonary: Normal respiratory effort. CTAB.   Cardiac: RRR, normal S1/S2, no S3/S4, no murmur or rub.   Abdomen:  Non-tender abdomen, no hepatosplenomegaly appreciated.   Vascular: Pulses in the upper and lower extremities are 2+ and equal bilaterally.  Extremities: 1+ ankle edema bilaterally.  Skin:  No rashes or lesions appreciated.   Neurological:  No gross motor or sensory deficits.   Psych: Appropriate affect.        Data:     Labs reviewed:  Recent Labs   Lab Test 01/12/22  1028 12/08/21  1102 09/02/21  0851 03/15/21  1254 03/01/21  1209 09/30/20  1024 06/01/20  0907 05/13/20  0537 06/12/19  0818   LDL  --   --  57  --   --   --  54  --  74   HDL  --   --  43  --   --   --  48  --  46   NHDL  --   --  67  --   --   --  71  --  90   CHOL  --   --  110  --   --   --  119  --  136   TRIG  --   --  51  --   --   --  87  --  82   TSH  --   --   --  1.68  --   --   --   --   --    NTBNP 8,937* 3,659* 3,764*  --   --    < >  --   --   --    IRON  --   --   --   --  82   < >  --    < > 117   FEB  --   --   --   --  286   < >  --    < > 312   IRONSAT  --   --   --   --  29   < >  --    < > 38   RJ  --   --   --   --  495*   < >  --    < > 602*    < > = values in this interval not displayed.       Lab Results   Component Value Date    WBC 5.1 02/26/2022    WBC 3.2 (L) 06/07/2021    RBC 4.01 (L) 02/26/2022    RBC 4.89 06/07/2021    HGB 8.8 (L) 02/26/2022    HGB 10.3 (L) 06/07/2021    HCT 28.6 (L) 02/26/2022    HCT 33.5 (L) 06/07/2021    MCV 71 (L) 02/26/2022    MCV 69 (L) 06/07/2021    MCH 21.9 (L) 02/26/2022    MCH 21.1 (L) 06/07/2021    MCHC 30.8 (L) 02/26/2022    MCHC 30.7 (L) 06/07/2021    RDW 19.8 (H) 02/26/2022    RDW 16.9 (H) 06/07/2021     02/26/2022     (L)  06/07/2021       Lab Results   Component Value Date     (L) 02/26/2022     06/07/2021    POTASSIUM 5.0 02/26/2022    POTASSIUM 4.3 06/07/2021    CHLORIDE 103 02/26/2022    CHLORIDE 104 06/07/2021    CO2 24 02/26/2022    CO2 27 06/07/2021    ANIONGAP 5 02/26/2022    ANIONGAP 5 06/07/2021     (H) 02/26/2022     (H) 02/26/2022    GLC 97 06/07/2021    BUN 46 (H) 02/26/2022    BUN 54 (H) 06/07/2021    CR 1.58 (H) 02/26/2022    CR 1.72 (H) 06/07/2021    GFRESTIMATED 42 (L) 02/26/2022    GFRESTIMATED 35 (L) 06/07/2021    GFRESTBLACK 41 (L) 06/07/2021    AYALA 8.8 02/26/2022    AYALA 8.9 06/07/2021      Lab Results   Component Value Date    AST 16 02/23/2022    AST 25 03/15/2021    ALT 22 02/23/2022    ALT 38 03/15/2021       No results found for: A1C    Lab Results   Component Value Date    INR 1.51 (H) 03/15/2021    INR 1.21 (H) 07/13/2020           Problem List:     Patient Active Problem List   Diagnosis     Benign neoplasm of colon     Localized osteoarthrosis, lower leg     Allergic rhinitis     Moderate persistent asthma     Osteoporosis     Hypertension goal BP (blood pressure) < 140/90     Hyperlipidemia with target LDL less than 100     CKD (chronic kidney disease) stage 3, GFR 30-59 ml/min (H)     Advanced directives, counseling/discussion     Iron deficiency anemia     Vitamin B 12 deficiency     Health Care Home     Acute respiratory failure (H)     Acute on chronic congestive heart failure, unspecified heart failure type (H)     Severe aortic stenosis -- S/P TAVR 12/2020     Coronary artery disease involving native coronary artery of native heart without angina pectoris     CAD, S/P CABG in 2002     Hyperkalemia     GI bleed     Pneumonia     Persistent Afib -- on Pradaxa      Thrombus of left atrial appendage     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     Aortic stenosis, severe     Acute congestive heart failure, unspecified heart failure type (H)     Acute respiratory failure with hypoxia  (H)     Hx of Esophageal ulcer -- Cauterized 5/24/20     Anemia of chronic disease (baseline Hgb 9.0-10.5)     Chronic combined systolic and diastolic heart failure (H)     Pain in joint involving ankle and foot, right     Thrombocytopenia, unspecified (H)     Bilateral deafness     Postoperative state           Medications:     Current Outpatient Medications   Medication Sig Dispense Refill     acetaminophen (TYLENOL) 500 MG tablet Take 1,000 mg by mouth 2 times daily       albuterol (PROAIR HFA/PROVENTIL HFA/VENTOLIN HFA) 108 (90 Base) MCG/ACT inhaler INHALE 1 TO 2 PUFFS BY MOUTH EVERY 4 TO 6 HOURS AS NEEDED 18 g 2     aspirin (ASA) 81 MG EC tablet Take 81 mg by mouth daily       budesonide (PULMICORT) 0.5 MG/2ML nebulizer solution Take 2 mLs (0.5 mg) by nebulization 2 times daily 120 mL 0     calcium carbonate 600 mg-vitamin D 400 units (CALTRATE) 600-400 MG-UNIT per tablet Take 1 tablet by mouth daily       dabigatran ANTICOAGULANT (PRADAXA) 150 MG capsule Take 1 capsule (150 mg) by mouth 2 times daily 180 capsule 3     ferrous sulfate (IRON) 325 (65 Fe) MG tablet TAKE 1 TABLET(325 MG) BY MOUTH TWICE DAILY 180 tablet 0     hydrALAZINE (APRESOLINE) 25 MG tablet Take 0.5 tablets (12.5 mg) by mouth 3 times daily 135 tablet 3     ipratropium - albuterol 0.5 mg/2.5 mg/3 mL (DUONEB) 0.5-2.5 (3) MG/3ML nebulization Inhale 1 vial (3 mLs) into the lungs 4 times daily 360 mL 11     isosorbide mononitrate (IMDUR) 30 MG 24 hr tablet Take 1 tablet (30 mg) by mouth daily 90 tablet 1     magnesium chloride (MAG64) 535 (64 Mg) MG TBEC CR tablet Take 1 tablet (535 mg) by mouth 2 times daily 180 tablet 3     meclizine (ANTIVERT) 12.5 MG tablet Take 12.5 mg by mouth 3 times daily as needed for dizziness       melatonin 3 MG tablet Take 1 tablet (3 mg) by mouth nightly as needed for sleep 30 tablet 0     metoprolol tartrate (LOPRESSOR) 25 MG tablet TAKE 1 TABLET BY MOUTH TWICE A  tablet 1     montelukast (SINGULAIR) 10 MG  tablet Take 1 tablet (10 mg) by mouth daily 90 tablet 1     multivitamin, therapeutic (THERA-VIT) TABS tablet Take 1 tablet by mouth every evening        nitroGLYcerin (NITROSTAT) 0.4 MG sublingual tablet For chest pain place 1 tablet under the tongue every 5 minutes for 3 doses. If symptoms persist 5 minutes after 1st dose call 911. 12 tablet 0     pantoprazole (PROTONIX) 40 MG EC tablet TAKE 1 TABLET BEFORE MEALS TWICE A  tablet 1     senna-docusate (SENOKOT-S/PERICOLACE) 8.6-50 MG tablet Take 1 tablet by mouth 2 times daily 60 tablet 0     simvastatin (ZOCOR) 40 MG tablet Take 1 tablet (40 mg) by mouth At Bedtime 90 tablet 3     torsemide (DEMADEX) 5 MG tablet Take 2 tablets (10 mg) by mouth daily 180 tablet 3           Past Medical History:     Past Medical History:   Diagnosis Date     Allergic rhinitis, cause unspecified      Anemia, unspecified      Aortic stenosis S/P TAVR      Benign neoplasm of colon 1999    Ademonatous polyp     CHF 2nd to TAVR -- S/P Pacemaker 12/2020     CKD (chronic kidney disease) stage 3, GFR 30-59 ml/min (H) 05/12/2011     Community acquired pneumonia 9/29/2020     Coronary atherosclerosis of unspecified type of vessel, native or graft     s/p CABG 2002     Esophageal ulcer w UGI bleed 05/24/2020    Had EGD adn caurtery 5/24/20, colonoscopy with diverticulosis then     Hyperlipidemia LDL goal < 100      Hypertension goal BP (blood pressure) < 140/90      Localized osteoarthrosis not specified whether primary or secondary, lower leg     left knee     Mild persistent asthma without complication 1/11/2016     Moderate persistent asthma      Persistent atrial fibrillation (H)      Unspecified hearing loss      Past Surgical History:   Procedure Laterality Date     CARDIAC SURGERY       COLONOSCOPY N/A 5/26/2020    Procedure: COLONOSCOPY;  Surgeon: Ignacio Fournier MD;  Location:  GI     CV ANGIOGRAM CORONARY GRAFT N/A 4/24/2020    Procedure: Angiogram Coronary Graft;   Surgeon: Addison Willard MD;  Location:  HEART CARDIAC CATH LAB     CV CORONARY ANGIOGRAM N/A 2020    Procedure: Coronary Angiogram;  Surgeon: Addison Willard MD;  Location:  HEART CARDIAC CATH LAB     CV RIGHT HEART CATH MEASUREMENTS RECORDED N/A 2020    Procedure: Right Heart Cath;  Surgeon: Addison Willard MD;  Location:  HEART CARDIAC CATH LAB     CV TRANSCATHETER AORTIC VALVE REPLACEMENT N/A 2020    Procedure: Transcatheter Aortic Valve Replacement;  Surgeon: Soraida Monet MD;  Location:  HEART CARDIAC CATH LAB     EP PACEMAKER N/A 7/15/2020    Procedure: EP Pacemaker;  Surgeon: Tommie Sauer MD;  Location:  HEART CARDIAC CATH LAB     HC ESOPHAGOSCOPY, DIAGNOSTIC      Dr. Dow, lower esophageal ring & mild gastritis     HERNIORRHAPHY INGUINAL Right 2016    Procedure: HERNIORRHAPHY INGUINAL;  Surgeon: Alexis Alexandra MD;  Location: [facility]     LAPAROSCOPIC CHOLECYSTECTOMY      for biliary sludge     REVERSE ARTHROPLASTY SHOULDER Right 2022    Procedure: RIGHT REVERSE SHOULDER ARTHROPLASTY, WITH OPEN REDUCTION INTERNAL FIXATION, WITH NO CUSTOM GUIDE;  Surgeon: Wiley Vargas MD;  Location:  OR     ZZ NONSPECIFIC PROCEDURE      Coronary artery bypass     ZZHC COLONOSCOPY THRU STOMA W BIOPSY/CAUTERY TUMOR/POLYP/LESION      Dr. Dow, Q 5 years     ZZHC COLONOSCOPY THRU STOMA W BIOPSY/CAUTERY TUMOR/POLYP/LESION      Dr. Dow, one adenomatous polyp     Family History   Problem Relation Age of Onset     C.A.D. Father      Cancer Mother         breast cancer,  of pneumonia     Cerebrovascular Disease Son      CABG Son      Family History Negative Child      Family History Negative Sister      Heart Disease Brother      Social History     Socioeconomic History     Marital status:      Spouse name: Kailey     Number of children: 3     Years of education: Not on file     Highest education level: Not  on file   Occupational History     Occupation: [x+1]     Employer: RETIRED   Tobacco Use     Smoking status: Never Smoker     Smokeless tobacco: Never Used     Tobacco comment: smoked for a week in 20's   Substance and Sexual Activity     Alcohol use: Not Currently     Alcohol/week: 0.0 standard drinks     Drug use: No     Sexual activity: Yes     Partners: Female   Other Topics Concern      Service No     Blood Transfusions No     Caffeine Concern Not Asked     Occupational Exposure Not Asked     Hobby Hazards Not Asked     Sleep Concern Not Asked     Stress Concern Not Asked     Weight Concern Not Asked     Special Diet Not Asked     Back Care Not Asked     Exercise Yes     Bike Helmet Not Asked     Seat Belt Yes     Self-Exams No     Parent/sibling w/ CABG, MI or angioplasty before 65F 55M? Yes   Social History Narrative    Balanced Diet - Yes    Osteoporosis Preventative measures-  Dairy servings per day: 2 to 3 servings daily    Regular Exercise -  Yes Describe walks 1.5 mile daily     Dental Exam up - YES - Date: 2 years ago    Eye Exam - YES - Date: 1 year ago    Self Testicular Exam -  No, handout given    Do you have any concerns about STD's -  No    Abuse: Current or Past (Physical, Sexual or Emotional)- No    Do you feel safe in your environment - Yes    Guns stored in the home - Yes, locked    Sunscreen used - No    Seatbelts used - Yes    Lipids - YES - Date: 3-09    Glucose -  YES - Date: 3-09    Colon Cancer Screening - Colonoscopy 3-08(date completed)    Hemoccults - UNKNOWN    PSA - YES - Date: 11-07    Digital Rectal Exam - UNKNOWN    Immunizations reviewed and up to date - Yes, td 1-2005, had shingles vaccine    5-28-09  JANEY Patel CMA                     Social Determinants of Health     Financial Resource Strain: Low Risk      Difficulty of Paying Living Expenses: Not hard at all   Food Insecurity: No Food Insecurity     Worried About Running Out of Food in the Last Year: Never true      Ran Out of Food in the Last Year: Never true   Transportation Needs: No Transportation Needs     Lack of Transportation (Medical): No     Lack of Transportation (Non-Medical): No   Physical Activity: Not on file   Stress: Not on file   Social Connections: Not on file   Intimate Partner Violence: Not on file   Housing Stability: Not on file           Allergies:   Patient has no known allergies.      Amee Rocha PA-C  [facility]  Pager: [phone]      Today's clinic visit entailed:  Review of the result(s) of each unique test - BMP from 2/2022, device interrogation from 1/2022  Ordering of each unique test  Prescription drug management  I spent a total of 40 minutes on the day of the visit.   Time spent doing chart review, history and exam, documentation and further activities per the note  Provider  Link to MDM Help Grid     The level of medical decision making during this visit was of moderate complexity.

## 2022-04-19 ENCOUNTER — TRANSFERRED RECORDS (OUTPATIENT)
Dept: HEALTH INFORMATION MANAGEMENT | Facility: CLINIC | Age: 87
End: 2022-04-19
Payer: COMMERCIAL

## 2022-04-20 NOTE — H&P
Admitted:     07/27/2020      PRIMARY CARE PHYSICIAN:  Dany Rodriguez MD      PRIMARY CARDIOLOGIST:  Jaxon Field MD      CHIEF COMPLAINT:  Shortness of breath.      HISTORY OF PRESENT ILLNESS:  Shiraz Ingram is a very pleasant 85-year-old  gentleman, who lives with his son with hearing impairment, who recently had undergone TAVR procedure for severe aortic stenosis complicated by complete heart block requiring permanent pacemaker placement, who also has a history of left atrial appendage thrombus, heart failure, remote history of coronary bypass surgery, history of GI bleed, chronic kidney disease, asthma, who presents now with acute decompensated congestive heart failure.      According to the patient's son, the patient has had some worsening lower extremity peripheral edema in the last couple of days.  The patient woke up on the afternoon of 07/27 with acute onset of shortness of breath.  The patient had a complicated July, including being admitted for community-acquired pneumonia in early July.  This was followed by in mid July when he underwent TAVR with a bioprosthetic [employer] valve complicated by complete heart block at which time he received a pacemaker.  He was discharged on Pradaxa, metoprolol, and lisinopril.  According to the son, the patient has been doing quite well and was much more active and more interactive and seemed to have better stamina.  The son denies any COVID exposure.  The patient denies any chest pain, other than leg swelling and dyspnea.  The patient has been compliant with all of his medications.  His blood pressures were in the 160s.  The patient came to [facility] Emergency Department for further assessment.      In the Emergency Department, the patient was seen by Dr. Krishan Brand.  The patient was hypertensive, afebrile.  ProBNP was 4700.  Procalcitonin was negative.  Troponin was negative.  White count 4.3, hemoglobin 10.7, platelets 133.  D-dimer elevated  at 1.7.  Chest x-ray, however, showed bilateral pleural effusions with consolidation versus atelectasis without evidence of pneumothorax.  The patient was given IV Lasix and transferred to the cardiac telemetry floor for further treatment.      PAST MEDICAL HISTORY:   1.  History of severe aortic stenosis, status post TAVR on 07/14.   2.  Post TAVR complete heart block requiring single-chamber permanent pacemaker placement.   3.  History of acute on chronic congestive heart failure.   4.  Allergic rhinitis.   5.  Anemia.   6.  History of colon neoplasm.   7.  Chronic kidney disease.   8.  ASCVD with coronary bypass surgery.   9.  Hyperlipidemia.   10.  Hypertension.   11.  Osteoarthritis.   12.  Mild persistent asthma.   13.  Persistent atrial fibrillation with complication of thrombus of the atrial appendage.   14.  History of pneumonia.   15.  History of hearing loss.      PAST SURGICAL HISTORY:   1.  Coronary artery bypass surgery.   2.  Colonoscopy.   3.  History of right heart catheterization.     4.  TAVR.   5.  EGD.   6.  Inguinal herniorrhaphy.   7.  Pacemaker implantation.   8.  Laparoscopic cholecystectomy.      FAMILY HISTORY:  Positive for stroke, cancer, and coronary artery disease.      SOCIAL HISTORY:  The patient lives with his son.  Former tobacco.  Rare alcohol.  No IV drug use.  He is hard of hearing.      ALLERGIES:  NO KNOWN DRUG ALLERGIES.      CURRENT MEDICATION LIST:  Includes:   1.  Pulmicort nebs.   2.  Pradaxa.   3.  Ferrous sulfate.   4.  DuoNebs.   5.  Metoprolol.   6.  Singulair.   7.  Sublingual nitroglycerin.   8.  Simvastatin.   9.  Aspirin.   10.  Calcium with vitamin D.   11.  Lisinopril.   12.  Meclizine.   13.  Multivitamin.      REVIEW OF SYSTEMS:  Ten points reviewed.  The patient complains of shortness of breath.  Some slight cough.  Slightly productive sputum.  No fevers, no chills, no sweats.  No travel.  No diarrhea.  No loss of smell or taste.  No abdominal pain.  No black  or bloody stools.  No headache.  No chest pain or pressure.  Otherwise, 10-point systems reviewed and unremarkable.      PHYSICAL EXAMINATION:   VITAL SIGNS:  Temperature 98.2, heart rate 79, respirations 16, blood pressure initially was 196/87, currently 149/78, sats are 97% on 2 liters.   GENERAL:  The patient is an 85-year-old  gentleman, who is hard of hearing.   HEENT:  Pupils equal.  Sclerae are anicteric.  Mucous membranes are moist.   NECK:  JVP is elevated to the angle of the jaw.   PULMONARY:  He has fine crackles bilaterally.   CARDIOVASCULAR:  S1, S2, irregularly irregular.  He has a 2/6 systolic murmur.   GASTROINTESTINAL:  Abdomen is soft, nontender, slightly distended.   MUSCULOSKELETAL:  The patient has bilateral lower extremity edema, right greater than left.   SKIN:  Warm, dry, well perfused.   PSYCHIATRIC:  Mood and affect stable.     NEUROLOGIC:  He is hard of hearing, but able to move all 4 extremities.      LABORATORY AND IMAGING STUDIES:  As dictated in history of present illness.      ASSESSMENT:  Shiraz Ingram is 85 who recently underwent TAVR (transcatheter aortic valve replacement) and pacemaker placement and known coronary artery disease, including bypass in the past, as well as prior history of heart failure and is now admitted with acute decompensated congestive heart failure.      PLAN:   1.  Acute decompensated congestive heart failure:  The patient has what appears to be both right and left heart components.  The patient recently underwent TAVR and single-chamber pacemaker placement.  He is in atrial fibrillation, but is rate controlled.  We will do serial troponins to rule out for ischemia.  ECG does not show any ischemic pattern.  We will also get an echocardiogram to look for any post-TAVR complications, such as paravalvular leak.  We will obtain a formal Cardiology consultation as well.  Meanwhile, the patient will be given IV diuresis in the form of IV Lasix.  We will  monitor I's and O's and daily weights.  The patient will be started on a nitro patch for improved blood pressure control.   2.  ASCVD (atherosclerotic cardiovascular disease) with history of coronary artery bypass surgery:  The patient will undergo serial troponins and have a nitro patch placed.  Monitor for any ischemic symptoms.   3.  Hypertension:  The patient will be continued on his lisinopril, metoprolol, and nitroglycerin.  Additionally, he is quite hypertensive which may be due to his decompensated heart failure.   4.  Allergic rhinitis:  We will continue the patient on his nasal steroid inhaler.   5.  History of atrial fibrillation:  The patient's heart rate is controlled.  He had a pacemaker placed recently.  The patient will be continued on his Pradaxa.   6.  Hyperlipidemia:  We will continue the patient on his simvastatin.   7.  Moderate persistent asthma:  The patient will be continued on his Pulmicort, as well as Singulair.   8.  Deep venous thrombosis prophylaxis:  The patient is already on Pradaxa.      CODE STATUS:  FULL.         VENANCIO KRUSE MD             D: 2020   T: 2020   MT: OMAR      Name:     SRIKANTH OBANDO   MRN:      [id number]        Account:      [id number]   :      1934        Admitted:     2020                   Document: [id number]       cc: Jaxon Field MD, [facility]       Dany Rodriguez MD    Epidermal Closure: simple interrupted

## 2022-04-22 ENCOUNTER — ANCILLARY PROCEDURE (OUTPATIENT)
Dept: CARDIOLOGY | Facility: CLINIC | Age: 87
End: 2022-04-22
Attending: INTERNAL MEDICINE
Payer: COMMERCIAL

## 2022-04-22 DIAGNOSIS — Z95.0 CARDIAC PACEMAKER IN SITU: ICD-10-CM

## 2022-04-22 LAB
MDC_IDC_LEAD_IMPLANT_DT: NORMAL
MDC_IDC_LEAD_LOCATION: NORMAL
MDC_IDC_LEAD_LOCATION_DETAIL_1: NORMAL
MDC_IDC_LEAD_MFG: NORMAL
MDC_IDC_LEAD_MODEL: NORMAL
MDC_IDC_LEAD_POLARITY_TYPE: NORMAL
MDC_IDC_LEAD_SERIAL: NORMAL
MDC_IDC_MSMT_BATTERY_DTM: NORMAL
MDC_IDC_MSMT_BATTERY_REMAINING_LONGEVITY: 166 MO
MDC_IDC_MSMT_BATTERY_RRT_TRIGGER: 2.62
MDC_IDC_MSMT_BATTERY_STATUS: NORMAL
MDC_IDC_MSMT_BATTERY_VOLTAGE: 3.06 V
MDC_IDC_MSMT_LEADCHNL_RV_IMPEDANCE_VALUE: 323 OHM
MDC_IDC_MSMT_LEADCHNL_RV_IMPEDANCE_VALUE: 551 OHM
MDC_IDC_MSMT_LEADCHNL_RV_PACING_THRESHOLD_AMPLITUDE: 0.88 V
MDC_IDC_MSMT_LEADCHNL_RV_PACING_THRESHOLD_PULSEWIDTH: 0.4 MS
MDC_IDC_MSMT_LEADCHNL_RV_SENSING_INTR_AMPL: 8.12 MV
MDC_IDC_MSMT_LEADCHNL_RV_SENSING_INTR_AMPL: 8.12 MV
MDC_IDC_PG_IMPLANT_DTM: NORMAL
MDC_IDC_PG_MFG: NORMAL
MDC_IDC_PG_MODEL: NORMAL
MDC_IDC_PG_SERIAL: NORMAL
MDC_IDC_PG_TYPE: NORMAL
MDC_IDC_SESS_CLINIC_NAME: NORMAL
MDC_IDC_SESS_DTM: NORMAL
MDC_IDC_SESS_TYPE: NORMAL
MDC_IDC_SET_BRADY_HYSTRATE: NORMAL
MDC_IDC_SET_BRADY_LOWRATE: 50 {BEATS}/MIN
MDC_IDC_SET_BRADY_MODE: NORMAL
MDC_IDC_SET_LEADCHNL_RV_PACING_AMPLITUDE: 2 V
MDC_IDC_SET_LEADCHNL_RV_PACING_ANODE_ELECTRODE_1: NORMAL
MDC_IDC_SET_LEADCHNL_RV_PACING_ANODE_LOCATION_1: NORMAL
MDC_IDC_SET_LEADCHNL_RV_PACING_CAPTURE_MODE: NORMAL
MDC_IDC_SET_LEADCHNL_RV_PACING_CATHODE_ELECTRODE_1: NORMAL
MDC_IDC_SET_LEADCHNL_RV_PACING_CATHODE_LOCATION_1: NORMAL
MDC_IDC_SET_LEADCHNL_RV_PACING_POLARITY: NORMAL
MDC_IDC_SET_LEADCHNL_RV_PACING_PULSEWIDTH: 0.4 MS
MDC_IDC_SET_LEADCHNL_RV_SENSING_ANODE_ELECTRODE_1: NORMAL
MDC_IDC_SET_LEADCHNL_RV_SENSING_ANODE_LOCATION_1: NORMAL
MDC_IDC_SET_LEADCHNL_RV_SENSING_CATHODE_ELECTRODE_1: NORMAL
MDC_IDC_SET_LEADCHNL_RV_SENSING_CATHODE_LOCATION_1: NORMAL
MDC_IDC_SET_LEADCHNL_RV_SENSING_POLARITY: NORMAL
MDC_IDC_SET_LEADCHNL_RV_SENSING_SENSITIVITY: 0.9 MV
MDC_IDC_SET_ZONE_DETECTION_INTERVAL: 360 MS
MDC_IDC_SET_ZONE_TYPE: NORMAL
MDC_IDC_STAT_BRADY_DTM_END: NORMAL
MDC_IDC_STAT_BRADY_DTM_START: NORMAL
MDC_IDC_STAT_BRADY_RV_PERCENT_PACED: 35.14 %
MDC_IDC_STAT_EPISODE_RECENT_COUNT: 0
MDC_IDC_STAT_EPISODE_RECENT_COUNT_DTM_END: NORMAL
MDC_IDC_STAT_EPISODE_RECENT_COUNT_DTM_START: NORMAL
MDC_IDC_STAT_EPISODE_TOTAL_COUNT: 0
MDC_IDC_STAT_EPISODE_TOTAL_COUNT: 0
MDC_IDC_STAT_EPISODE_TOTAL_COUNT: 3
MDC_IDC_STAT_EPISODE_TOTAL_COUNT_DTM_END: NORMAL
MDC_IDC_STAT_EPISODE_TOTAL_COUNT_DTM_START: NORMAL
MDC_IDC_STAT_EPISODE_TYPE: NORMAL

## 2022-04-22 PROCEDURE — 93294 REM INTERROG EVL PM/LDLS PM: CPT | Performed by: INTERNAL MEDICINE

## 2022-04-22 PROCEDURE — 93296 REM INTERROG EVL PM/IDS: CPT | Performed by: INTERNAL MEDICINE

## 2022-04-25 DIAGNOSIS — I25.10 CORONARY ARTERY DISEASE INVOLVING NATIVE CORONARY ARTERY OF NATIVE HEART WITHOUT ANGINA PECTORIS: ICD-10-CM

## 2022-04-25 RX ORDER — DABIGATRAN ETEXILATE 150 MG/1
150 CAPSULE ORAL 2 TIMES DAILY
Qty: 180 CAPSULE | Refills: 0 | Status: SHIPPED | OUTPATIENT
Start: 2022-04-25 | End: 2022-07-26

## 2022-04-26 ENCOUNTER — OFFICE VISIT (OUTPATIENT)
Dept: FAMILY MEDICINE | Facility: CLINIC | Age: 87
End: 2022-04-26
Payer: COMMERCIAL

## 2022-04-26 VITALS
RESPIRATION RATE: 16 BRPM | BODY MASS INDEX: 23.72 KG/M2 | OXYGEN SATURATION: 97 % | WEIGHT: 156 LBS | TEMPERATURE: 97.9 F | DIASTOLIC BLOOD PRESSURE: 74 MMHG | SYSTOLIC BLOOD PRESSURE: 132 MMHG | HEART RATE: 58 BPM

## 2022-04-26 DIAGNOSIS — M25.571 PAIN IN JOINT, ANKLE AND FOOT, RIGHT: ICD-10-CM

## 2022-04-26 DIAGNOSIS — S42.291G: Primary | ICD-10-CM

## 2022-04-26 DIAGNOSIS — H91.93 BILATERAL DEAFNESS: ICD-10-CM

## 2022-04-26 DIAGNOSIS — I48.21 PERMANENT ATRIAL FIBRILLATION (H): ICD-10-CM

## 2022-04-26 PROCEDURE — 90750 HZV VACC RECOMBINANT IM: CPT | Performed by: FAMILY MEDICINE

## 2022-04-26 PROCEDURE — 90471 IMMUNIZATION ADMIN: CPT | Performed by: FAMILY MEDICINE

## 2022-04-26 PROCEDURE — 99214 OFFICE O/P EST MOD 30 MIN: CPT | Mod: 25 | Performed by: FAMILY MEDICINE

## 2022-04-26 NOTE — NURSING NOTE
Clinic Administered Medication Documentation          Injectable Medication Documentation    Patient was given shingrix. Prior to medication administration, verified patients identity using patient s name and date of birth. Please see MAR and medication order for additional information. Patient instructed to remain in clinic for 15 minutes.      Was entire vial of medication used? Yes  Vial/Syringe: Single dose vial  Was this medication supplied by the patient? No     Angela Workman CMA

## 2022-04-26 NOTE — PATIENT INSTRUCTIONS
Did you know?      You can schedule a video visit for follow-up appointments as well as future appointments for certain conditions.  Please see the below link.     Video Visits ([email or web address])     If you have not already done so,  I encourage you to sign up for [email or web address] ([email or web address]).  This will allow you to review your results, securely communicate with a provider, and schedule virtual visits as well.

## 2022-04-26 NOTE — PROGRESS NOTES
Assessment & Plan     Closed 4-part fracture of proximal humerus with delayed healing, right  S/p right shoulder arthroplasty.  Recovering well.  Having some discomfort with new exercises but overall feeling fine.  Tylenol is working well and he does not think he needs any other intervention.    Permanent atrial fibrillation (H)  Currently on anticoagulant.  Aware not to use any NSAIDs.  Also on metoprolol.    Bilateral deafness  Using .    Pain in joint, ankle and foot, right  No new episode.  Overall feeling really well.    Return in about 6 months (around 10/26/2022).    Dany Rodriguez MD, MD  [facility]    Javon Weldon is a 87 year old who presents for the following health issues     HPI         Gout/ Single Inflamed Joint  Onset/Duration: follow up  Description:   Location: ankl3 - bilateral  Joint Swelling: no  Redness: no  Pain: no  Intensity: mild  Progression of Symptoms: improving  Accompanying Signs & Symptoms:  Fevers: no  History:   Trauma to the area: no  Previous history of gout: YES  Recent illness: no  Alcohol use: no  Diuretic use: no  Precipitating or alleviating factors: None  Therapies tried and outcome: none    Additional: pt has problem sleeping and thinks may be caused by right shoulder pain. Since Feb. 25 2022. Uses aspirin for pain and somewhat helpful.    shoulder is healing well. Getting stronger. Does exercises twice per day. Will see ortho May 6th - Dr Fiore. Still hard to sleep. Has to sleep on back or left and it still hurts. Sometime taking tylenol for pain control.      No new episode of gout. Feet are fine.     Has 7 dogs - 2 boys and one has 5 and another one has 2. Able to drive OK.     Been to lung doctor for copd follow up. They gave him something for breathing and heartbeat. He does not know results yet.     Stools are normal. Not needing stool softner.     Review of Systems         Objective    /74  (BP Location: Right arm, Patient Position: Sitting, Cuff Size: Adult Regular)   Pulse 58   Temp 97.9  F (36.6  C) (Temporal)   Resp 16   Wt 70.8 kg (156 lb)   SpO2 97%   BMI 23.72 kg/m    Body mass index is 23.72 kg/m .  Physical Exam     Both lower limbs leg mild pitting edema  Lungs clear  Heart RRR.

## 2022-05-05 DIAGNOSIS — E78.5 HYPERLIPIDEMIA WITH TARGET LDL LESS THAN 100: ICD-10-CM

## 2022-05-05 RX ORDER — SIMVASTATIN 40 MG
40 TABLET ORAL AT BEDTIME
Qty: 90 TABLET | Refills: 3 | Status: SHIPPED | OUTPATIENT
Start: 2022-05-05 | End: 2023-05-09

## 2022-05-10 DIAGNOSIS — I50.31 ACUTE DIASTOLIC HEART FAILURE (H): ICD-10-CM

## 2022-05-10 DIAGNOSIS — Z95.2 S/P TAVR (TRANSCATHETER AORTIC VALVE REPLACEMENT): ICD-10-CM

## 2022-05-10 DIAGNOSIS — E78.5 HYPERLIPIDEMIA WITH TARGET LDL LESS THAN 100: ICD-10-CM

## 2022-05-10 RX ORDER — TORSEMIDE 5 MG/1
10 TABLET ORAL DAILY
Qty: 180 TABLET | Refills: 0 | Status: SHIPPED | OUTPATIENT
Start: 2022-05-10 | End: 2022-09-06

## 2022-05-30 ENCOUNTER — TRANSFERRED RECORDS (OUTPATIENT)
Dept: HEALTH INFORMATION MANAGEMENT | Facility: CLINIC | Age: 87
End: 2022-05-30

## 2022-06-20 ENCOUNTER — HOSPITAL ENCOUNTER (OUTPATIENT)
Dept: CARDIOLOGY | Facility: CLINIC | Age: 87
Discharge: HOME | End: 2022-06-20
Attending: PHYSICIAN ASSISTANT | Admitting: PHYSICIAN ASSISTANT
Payer: COMMERCIAL

## 2022-06-20 ENCOUNTER — LAB (OUTPATIENT)
Dept: LAB | Facility: CLINIC | Age: 87
End: 2022-06-20
Payer: COMMERCIAL

## 2022-06-20 DIAGNOSIS — I50.42 CHRONIC COMBINED SYSTOLIC AND DIASTOLIC HEART FAILURE (H): ICD-10-CM

## 2022-06-20 LAB
ANION GAP SERPL CALCULATED.3IONS-SCNC: 4 MMOL/L (ref 3–14)
BUN SERPL-MCNC: 55 MG/DL (ref 7–30)
CALCIUM SERPL-MCNC: 9.1 MG/DL (ref 8.5–10.1)
CHLORIDE BLD-SCNC: 106 MMOL/L (ref 94–109)
CO2 SERPL-SCNC: 27 MMOL/L (ref 20–32)
CREAT SERPL-MCNC: 1.71 MG/DL (ref 0.66–1.25)
ERYTHROCYTE [DISTWIDTH] IN BLOOD BY AUTOMATED COUNT: 17.7 % (ref 10–15)
GFR SERPL CREATININE-BSD FRML MDRD: 38 ML/MIN/1.73M2
GLUCOSE BLD-MCNC: 111 MG/DL (ref 70–99)
HCT VFR BLD AUTO: 37.4 % (ref 40–53)
HGB BLD-MCNC: 11.3 G/DL (ref 13.3–17.7)
LVEF ECHO: NORMAL
MCH RBC QN AUTO: 20.5 PG (ref 26.5–33)
MCHC RBC AUTO-ENTMCNC: 30.2 G/DL (ref 31.5–36.5)
MCV RBC AUTO: 68 FL (ref 78–100)
NT-PROBNP SERPL-MCNC: 3930 PG/ML (ref 0–1800)
PLATELET # BLD AUTO: 115 10E3/UL (ref 150–450)
POTASSIUM BLD-SCNC: 4.5 MMOL/L (ref 3.4–5.3)
RBC # BLD AUTO: 5.5 10E6/UL (ref 4.4–5.9)
SODIUM SERPL-SCNC: 137 MMOL/L (ref 133–144)
WBC # BLD AUTO: 3.7 10E3/UL (ref 4–11)

## 2022-06-20 PROCEDURE — 999N000208 ECHOCARDIOGRAM COMPLETE

## 2022-06-20 PROCEDURE — T1013 SIGN LANG/ORAL INTERPRETER: HCPCS | Mod: U3

## 2022-06-20 PROCEDURE — 93306 TTE W/DOPPLER COMPLETE: CPT | Mod: 26 | Performed by: INTERNAL MEDICINE

## 2022-06-20 PROCEDURE — 36415 COLL VENOUS BLD VENIPUNCTURE: CPT | Performed by: PHYSICIAN ASSISTANT

## 2022-06-20 PROCEDURE — 255N000002 HC RX 255 OP 636: Performed by: PHYSICIAN ASSISTANT

## 2022-06-20 PROCEDURE — 83880 ASSAY OF NATRIURETIC PEPTIDE: CPT | Performed by: PHYSICIAN ASSISTANT

## 2022-06-20 PROCEDURE — 80048 BASIC METABOLIC PNL TOTAL CA: CPT | Performed by: PHYSICIAN ASSISTANT

## 2022-06-20 PROCEDURE — 85027 COMPLETE CBC AUTOMATED: CPT | Performed by: PHYSICIAN ASSISTANT

## 2022-06-20 RX ADMIN — HUMAN ALBUMIN MICROSPHERES AND PERFLUTREN 9 ML: 10; .22 INJECTION, SOLUTION INTRAVENOUS at 12:02

## 2022-06-21 ENCOUNTER — TRANSFERRED RECORDS (OUTPATIENT)
Dept: HEALTH INFORMATION MANAGEMENT | Facility: CLINIC | Age: 87
End: 2022-06-21

## 2022-06-22 ENCOUNTER — OFFICE VISIT (OUTPATIENT)
Dept: CARDIOLOGY | Facility: CLINIC | Age: 87
End: 2022-06-22
Attending: PHYSICIAN ASSISTANT
Payer: COMMERCIAL

## 2022-06-22 VITALS
HEART RATE: 51 BPM | DIASTOLIC BLOOD PRESSURE: 78 MMHG | SYSTOLIC BLOOD PRESSURE: 150 MMHG | HEIGHT: 68 IN | BODY MASS INDEX: 24.1 KG/M2 | OXYGEN SATURATION: 96 % | WEIGHT: 159 LBS

## 2022-06-22 DIAGNOSIS — Z95.2 S/P TAVR (TRANSCATHETER AORTIC VALVE REPLACEMENT): Primary | ICD-10-CM

## 2022-06-22 DIAGNOSIS — K22.11 ULCER OF ESOPHAGUS WITH BLEEDING: ICD-10-CM

## 2022-06-22 DIAGNOSIS — I10 HYPERTENSION GOAL BP (BLOOD PRESSURE) < 140/90: ICD-10-CM

## 2022-06-22 DIAGNOSIS — I50.42 CHRONIC COMBINED SYSTOLIC AND DIASTOLIC HEART FAILURE (H): ICD-10-CM

## 2022-06-22 DIAGNOSIS — I48.20 CHRONIC ATRIAL FIBRILLATION (H): ICD-10-CM

## 2022-06-22 PROCEDURE — 99214 OFFICE O/P EST MOD 30 MIN: CPT | Performed by: INTERNAL MEDICINE

## 2022-06-22 PROCEDURE — T1013 SIGN LANG/ORAL INTERPRETER: HCPCS | Mod: U3 | Performed by: INTERNAL MEDICINE

## 2022-06-22 NOTE — LETTER
6/22/2022    Dany Rodriguez MD, MD  [address] [place] [id number]    RE: Shiraz PALOMARES Juan Jose       Dear Colleague,     I had the pleasure of seeing Shiraz Ingram in the [facility].  HPI and Plan:     1. 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 Currently appears just slightly volume up with some increased peripheral edema at a weight of 158 lbs. This is in the setting of inactivity after shoulder surgery.   2.  CKD-III; with a creatinine at baseline ~1.6.   3.  Hypertension, controlled.   4.  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  Mean gradient 9 mmHg from echo in 3-2022.  5.  Chronic atrial fibrillation with history of MAYA thrombus, on anticoagulation with Pradaxa.  6. CAD, s/p CABG. Denies angina.   7. Hyperlipidemia, well-controlled as of 9/2021.  8. JOSÉ ANTONIO diagnosed 2022.    He appears to be doing well at this time.  I am very happy to see that he has avoided hospitalizations for heart failure recently.    To new updates since we last saw him is that he had the right shoulder replacement.  He tells me this is working well.  He was also been diagnosed with obstructive sleep apnea recently and he is waiting for his CPAP machine.    Denies shortness of breath PND orthopnea ankle swelling chest pain.  Reports no bleeding.    Appears euvolemic on physical exam.  Weight steady 157 blood pressure at 150 systolic is just fine.    I asked me why lisinopril was stopped.  I reviewed his medical records I do see that it was stopped due to worsening renal function.  I am happy to tell him that his creatinine is actually improving a little as it was 2 and above when he stopped it and is now 1.71.  IIn any case with mildly reduced ejection fraction at the age of 87 I do not think lisinopril would make much difference.    Overall stable.  He will see Amee in 6 months time and myself in 1 years time for continued follow-up.    No orders of the defined types  were placed in this encounter.      No orders of the defined types were placed in this encounter.      Encounter Diagnosis   Name Primary?     Chronic combined systolic and diastolic heart failure (H)        CURRENT MEDICATIONS:  Current Outpatient Medications   Medication Sig Dispense Refill     acetaminophen (TYLENOL) 500 MG tablet Take 1,000 mg by mouth 2 times daily       albuterol (PROAIR HFA/PROVENTIL HFA/VENTOLIN HFA) 108 (90 Base) MCG/ACT inhaler INHALE 1 TO 2 PUFFS BY MOUTH EVERY 4 TO 6 HOURS AS NEEDED 18 g 2     aspirin (ASA) 81 MG EC tablet Take 81 mg by mouth daily       budesonide (PULMICORT) 0.5 MG/2ML nebulizer solution Take 2 mLs (0.5 mg) by nebulization 2 times daily 120 mL 0     calcium carbonate 600 mg-vitamin D 400 units (CALTRATE) 600-400 MG-UNIT per tablet Take 1 tablet by mouth daily       dabigatran ANTICOAGULANT (PRADAXA) 150 MG capsule Take 1 capsule (150 mg) by mouth 2 times daily 180 capsule 0     ferrous sulfate (IRON) 325 (65 Fe) MG tablet TAKE 1 TABLET(325 MG) BY MOUTH TWICE DAILY 180 tablet 0     hydrALAZINE (APRESOLINE) 25 MG tablet Take 0.5 tablets (12.5 mg) by mouth 3 times daily 135 tablet 3     ipratropium - albuterol 0.5 mg/2.5 mg/3 mL (DUONEB) 0.5-2.5 (3) MG/3ML nebulization Inhale 1 vial (3 mLs) into the lungs 4 times daily 360 mL 11     isosorbide mononitrate (IMDUR) 30 MG 24 hr tablet Take 1 tablet (30 mg) by mouth daily 90 tablet 1     magnesium chloride (MAG64) 535 (64 Mg) MG TBEC CR tablet Take 1 tablet (535 mg) by mouth 2 times daily 180 tablet 3     melatonin 3 MG tablet Take 1 tablet (3 mg) by mouth nightly as needed for sleep 30 tablet 0     metoprolol tartrate (LOPRESSOR) 25 MG tablet TAKE 1 TABLET BY MOUTH TWICE A  tablet 1     montelukast (SINGULAIR) 10 MG tablet Take 1 tablet (10 mg) by mouth daily 90 tablet 1     multivitamin, therapeutic (THERA-VIT) TABS tablet Take 1 tablet by mouth every evening        nitroGLYcerin (NITROSTAT) 0.4 MG sublingual tablet  For chest pain place 1 tablet under the tongue every 5 minutes for 3 doses. If symptoms persist 5 minutes after 1st dose call 911. 12 tablet 0     pantoprazole (PROTONIX) 40 MG EC tablet TAKE 1 TABLET BEFORE MEALS TWICE A  tablet 1     simvastatin (ZOCOR) 40 MG tablet Take 1 tablet (40 mg) by mouth At Bedtime 90 tablet 3     torsemide (DEMADEX) 5 MG tablet Take 2 tablets (10 mg) by mouth daily (Patient taking differently: Take 10 mg by mouth daily Patient taking 10 mg in the morning and 5 mg at night) 180 tablet 0       ALLERGIES   No Known Allergies    PAST MEDICAL HISTORY:  Past Medical History:   Diagnosis Date     Allergic rhinitis, cause unspecified      Anemia, unspecified      Aortic stenosis S/P TAVR      Benign neoplasm of colon 1999    Ademonatous polyp     CHF 2nd to TAVR -- S/P Pacemaker 12/2020     CKD (chronic kidney disease) stage 3, GFR 30-59 ml/min (H) 05/12/2011     Community acquired pneumonia 9/29/2020     Coronary atherosclerosis of unspecified type of vessel, native or graft     s/p CABG 2002     Esophageal ulcer w UGI bleed 05/24/2020    Had EGD adn caurtery 5/24/20, colonoscopy with diverticulosis then     Hyperlipidemia LDL goal < 100      Hypertension goal BP (blood pressure) < 140/90      Localized osteoarthrosis not specified whether primary or secondary, lower leg     left knee     Mild persistent asthma without complication 1/11/2016     Moderate persistent asthma      Persistent atrial fibrillation (H)      Unspecified hearing loss        PAST SURGICAL HISTORY:  Past Surgical History:   Procedure Laterality Date     CARDIAC SURGERY       COLONOSCOPY N/A 5/26/2020    Procedure: COLONOSCOPY;  Surgeon: Ignacio Fournier MD;  Location:  GI     CV ANGIOGRAM CORONARY GRAFT N/A 4/24/2020    Procedure: Angiogram Coronary Graft;  Surgeon: Addison Willard MD;  Location:  HEART CARDIAC CATH LAB     CV CORONARY ANGIOGRAM N/A 4/24/2020    Procedure: Coronary Angiogram;   Surgeon: Addison Willard MD;  Location:  HEART CARDIAC CATH LAB     CV RIGHT HEART CATH MEASUREMENTS RECORDED N/A 2020    Procedure: Right Heart Cath;  Surgeon: Addison Willard MD;  Location:  HEART CARDIAC CATH LAB     CV TRANSCATHETER AORTIC VALVE REPLACEMENT N/A 2020    Procedure: Transcatheter Aortic Valve Replacement;  Surgeon: Soraida Monet MD;  Location:  HEART CARDIAC CATH LAB     EP PACEMAKER N/A 7/15/2020    Procedure: EP Pacemaker;  Surgeon: Tommie Sauer MD;  Location:  HEART CARDIAC CATH LAB     HC ESOPHAGOSCOPY, DIAGNOSTIC      Dr. Dow, lower esophageal ring & mild gastritis     HERNIORRHAPHY INGUINAL Right 2016    Procedure: HERNIORRHAPHY INGUINAL;  Surgeon: Alexis Alexandra MD;  Location: [facility]     LAPAROSCOPIC CHOLECYSTECTOMY      for biliary sludge     REVERSE ARTHROPLASTY SHOULDER Right 2022    Procedure: RIGHT REVERSE SHOULDER ARTHROPLASTY, WITH OPEN REDUCTION INTERNAL FIXATION, WITH NO CUSTOM GUIDE;  Surgeon: Wiley Vargas MD;  Location:  OR     [facility] NONSPECIFIC PROCEDURE      Coronary artery bypass     ZZHC COLONOSCOPY THRU STOMA W BIOPSY/CAUTERY TUMOR/POLYP/LESION      Dr. Dow, Q 5 years     ZZHC COLONOSCOPY THRU STOMA W BIOPSY/CAUTERY TUMOR/POLYP/LESION      Dr. Dow, one adenomatous polyp       FAMILY HISTORY:  Family History   Problem Relation Age of Onset     C.A.D. Father      Cancer Mother         breast cancer,  of pneumonia     Cerebrovascular Disease Son      CABG Son      Family History Negative Child      Family History Negative Sister      Heart Disease Brother        SOCIAL HISTORY:  Social History     Socioeconomic History     Marital status:      Spouse name: Kailey     Number of children: 3   Occupational History     Occupation: [employer]     Employer: RETIRED   Tobacco Use     Smoking status: Never Smoker     Smokeless tobacco: Never Used     Tobacco comment:  smoked for a week in 20's   Vaping Use     Vaping Use: Never used   Substance and Sexual Activity     Alcohol use: Not Currently     Alcohol/week: 0.0 standard drinks     Drug use: No     Sexual activity: Yes     Partners: Female   Other Topics Concern      Service No     Blood Transfusions No     Exercise Yes     Seat Belt Yes     Self-Exams No     Parent/sibling w/ CABG, MI or angioplasty before 65F 55M? Yes   Social History Narrative    Balanced Diet - Yes    Osteoporosis Preventative measures-  Dairy servings per day: 2 to 3 servings daily    Regular Exercise -  Yes Describe walks 1.5 mile daily     Dental Exam up - YES - Date: 2 years ago    Eye Exam - YES - Date: 1 year ago    Self Testicular Exam -  No, handout given    Do you have any concerns about STD's -  No    Abuse: Current or Past (Physical, Sexual or Emotional)- No    Do you feel safe in your environment - Yes    Guns stored in the home - Yes, locked    Sunscreen used - No    Seatbelts used - Yes    Lipids - YES - Date: 3-09    Glucose -  YES - Date: 3-09    Colon Cancer Screening - Colonoscopy 3-08(date completed)    Hemoccults - UNKNOWN    PSA - YES - Date: 11-07    Digital Rectal Exam - UNKNOWN    Immunizations reviewed and up to date - Yes, td 1-2005, had shingles vaccine    5-28-09  JANEY Patel [facility]                     Social Determinants of Health     Financial Resource Strain: Low Risk      Difficulty of Paying Living Expenses: Not hard at all   Food Insecurity: No Food Insecurity     Worried About Running Out of Food in the Last Year: Never true     Ran Out of Food in the Last Year: Never true   Transportation Needs: No Transportation Needs     Lack of Transportation (Medical): No     Lack of Transportation (Non-Medical): No       Review of Systems:  Skin:  Negative     Eyes:  Positive for glasses  ENT:  Negative    Respiratory:  Negative shortness of breath;dyspnea on exertion  Cardiovascular:  chest  "pain;palpitations;Negative;dizziness;lightheadedness edema;Positive for  Gastroenterology: Negative    Genitourinary:  Negative    Musculoskeletal:  Negative back pain;neck pain  Neurologic:  Negative headaches  Psychiatric:       Heme/Lymph/Imm:  Negative allergies  Endocrine:  Negative      Physical Exam:  Vitals: BP (!) 150/78   Pulse 51   Ht 1.727 m (5' 8\")   Wt 72.1 kg (159 lb)   SpO2 96%   BMI 24.18 kg/m      Constitutional:           Skin:             Head:           Eyes:           Lymph:      ENT:           Neck:           Respiratory:            Cardiac:                                                           GI:           Extremities and Muscular Skeletal:                 Neurological:           Psych:           Recent Lab Results:  LIPID RESULTS:  Lab Results   Component Value Date    CHOL 110 09/02/2021    CHOL 119 06/01/2020    HDL 43 09/02/2021    HDL 48 06/01/2020    LDL 57 09/02/2021    LDL 54 06/01/2020    TRIG 51 09/02/2021    TRIG 87 06/01/2020    CHOLHDLRATIO 3.0 07/07/2015       LIVER ENZYME RESULTS:  Lab Results   Component Value Date    AST 16 02/23/2022    AST 25 03/15/2021    ALT 22 02/23/2022    ALT 38 03/15/2021       CBC RESULTS:  Lab Results   Component Value Date    WBC 3.7 (L) 06/20/2022    WBC 3.2 (L) 06/07/2021    RBC 5.50 06/20/2022    RBC 4.89 06/07/2021    HGB 11.3 (L) 06/20/2022    HGB 10.3 (L) 06/07/2021    HCT 37.4 (L) 06/20/2022    HCT 33.5 (L) 06/07/2021    MCV 68 (L) 06/20/2022    MCV 69 (L) 06/07/2021    MCH 20.5 (L) 06/20/2022    MCH 21.1 (L) 06/07/2021    MCHC 30.2 (L) 06/20/2022    MCHC 30.7 (L) 06/07/2021    RDW 17.7 (H) 06/20/2022    RDW 16.9 (H) 06/07/2021     (L) 06/20/2022     (L) 06/07/2021       BMP RESULTS:  Lab Results   Component Value Date     06/20/2022     06/07/2021    POTASSIUM 4.5 06/20/2022    POTASSIUM 4.3 06/07/2021    CHLORIDE 106 06/20/2022    CHLORIDE 104 06/07/2021    CO2 27 06/20/2022    CO2 27 06/07/2021    " ANIONGAP 4 06/20/2022    ANIONGAP 5 06/07/2021     (H) 06/20/2022    GLC 97 06/07/2021    BUN 55 (H) 06/20/2022    BUN 54 (H) 06/07/2021    CR 1.71 (H) 06/20/2022    CR 1.72 (H) 06/07/2021    GFRESTIMATED 38 (L) 06/20/2022    GFRESTIMATED 35 (L) 06/07/2021    GFRESTBLACK 41 (L) 06/07/2021    AYALA 9.1 06/20/2022    AYALA 8.9 06/07/2021        A1C RESULTS:  No results found for: A1C    INR RESULTS:  Lab Results   Component Value Date    INR 1.51 (H) 03/15/2021    INR 1.21 (H) 07/13/2020           AYSHA Rocha PA-C  [id number]  [place] [address]            HPI and Plan:   See dictation    No orders of the defined types were placed in this encounter.      No orders of the defined types were placed in this encounter.      Encounter Diagnosis   Name Primary?     Chronic combined systolic and diastolic heart failure (H)        CURRENT MEDICATIONS:  Current Outpatient Medications   Medication Sig Dispense Refill     acetaminophen (TYLENOL) 500 MG tablet Take 1,000 mg by mouth 2 times daily       albuterol (PROAIR HFA/PROVENTIL HFA/VENTOLIN HFA) 108 (90 Base) MCG/ACT inhaler INHALE 1 TO 2 PUFFS BY MOUTH EVERY 4 TO 6 HOURS AS NEEDED 18 g 2     aspirin (ASA) 81 MG EC tablet Take 81 mg by mouth daily       budesonide (PULMICORT) 0.5 MG/2ML nebulizer solution Take 2 mLs (0.5 mg) by nebulization 2 times daily 120 mL 0     calcium carbonate 600 mg-vitamin D 400 units (CALTRATE) 600-400 MG-UNIT per tablet Take 1 tablet by mouth daily       dabigatran ANTICOAGULANT (PRADAXA) 150 MG capsule Take 1 capsule (150 mg) by mouth 2 times daily 180 capsule 0     ferrous sulfate (IRON) 325 (65 Fe) MG tablet TAKE 1 TABLET(325 MG) BY MOUTH TWICE DAILY 180 tablet 0     hydrALAZINE (APRESOLINE) 25 MG tablet Take 0.5 tablets (12.5 mg) by mouth 3 times daily 135 tablet 3     ipratropium - albuterol 0.5 mg/2.5 mg/3 mL (DUONEB) 0.5-2.5 (3) MG/3ML nebulization Inhale 1 vial (3 mLs) into the lungs 4 times daily 360 mL 11      isosorbide mononitrate (IMDUR) 30 MG 24 hr tablet Take 1 tablet (30 mg) by mouth daily 90 tablet 1     magnesium chloride (MAG64) 535 (64 Mg) MG TBEC CR tablet Take 1 tablet (535 mg) by mouth 2 times daily 180 tablet 3     melatonin 3 MG tablet Take 1 tablet (3 mg) by mouth nightly as needed for sleep 30 tablet 0     metoprolol tartrate (LOPRESSOR) 25 MG tablet TAKE 1 TABLET BY MOUTH TWICE A  tablet 1     montelukast (SINGULAIR) 10 MG tablet Take 1 tablet (10 mg) by mouth daily 90 tablet 1     multivitamin, therapeutic (THERA-VIT) TABS tablet Take 1 tablet by mouth every evening        nitroGLYcerin (NITROSTAT) 0.4 MG sublingual tablet For chest pain place 1 tablet under the tongue every 5 minutes for 3 doses. If symptoms persist 5 minutes after 1st dose call 911. 12 tablet 0     pantoprazole (PROTONIX) 40 MG EC tablet TAKE 1 TABLET BEFORE MEALS TWICE A  tablet 1     simvastatin (ZOCOR) 40 MG tablet Take 1 tablet (40 mg) by mouth At Bedtime 90 tablet 3     torsemide (DEMADEX) 5 MG tablet Take 2 tablets (10 mg) by mouth daily (Patient taking differently: Take 10 mg by mouth daily Patient taking 10 mg in the morning and 5 mg at night) 180 tablet 0       ALLERGIES   No Known Allergies    PAST MEDICAL HISTORY:  Past Medical History:   Diagnosis Date     Allergic rhinitis, cause unspecified      Anemia, unspecified      Aortic stenosis S/P TAVR      Benign neoplasm of colon 1999    Ademonatous polyp     CHF 2nd to TAVR -- S/P Pacemaker 12/2020     CKD (chronic kidney disease) stage 3, GFR 30-59 ml/min (H) 05/12/2011     Community acquired pneumonia 9/29/2020     Coronary atherosclerosis of unspecified type of vessel, native or graft     s/p CABG 2002     Esophageal ulcer w UGI bleed 05/24/2020    Had EGD adn caurtery 5/24/20, colonoscopy with diverticulosis then     Hyperlipidemia LDL goal < 100      Hypertension goal BP (blood pressure) < 140/90      Localized osteoarthrosis not specified whether  primary or secondary, lower leg     left knee     Mild persistent asthma without complication 1/11/2016     Moderate persistent asthma      Persistent atrial fibrillation (H)      Unspecified hearing loss        PAST SURGICAL HISTORY:  Past Surgical History:   Procedure Laterality Date     CARDIAC SURGERY       COLONOSCOPY N/A 5/26/2020    Procedure: COLONOSCOPY;  Surgeon: Ignacio Fournier MD;  Location:  GI     CV ANGIOGRAM CORONARY GRAFT N/A 4/24/2020    Procedure: Angiogram Coronary Graft;  Surgeon: Addison Willard MD;  Location:  HEART CARDIAC CATH LAB     CV CORONARY ANGIOGRAM N/A 4/24/2020    Procedure: Coronary Angiogram;  Surgeon: Addison Willard MD;  Location:  HEART CARDIAC CATH LAB     CV RIGHT HEART CATH MEASUREMENTS RECORDED N/A 4/24/2020    Procedure: Right Heart Cath;  Surgeon: Addison Willard MD;  Location:  HEART CARDIAC CATH LAB     CV TRANSCATHETER AORTIC VALVE REPLACEMENT N/A 7/14/2020    Procedure: Transcatheter Aortic Valve Replacement;  Surgeon: Soraida Monet MD;  Location:  HEART CARDIAC CATH LAB     EP PACEMAKER N/A 7/15/2020    Procedure: EP Pacemaker;  Surgeon: Tommie Sauer MD;  Location:  HEART CARDIAC CATH LAB     HC ESOPHAGOSCOPY, DIAGNOSTIC  5/03    Dr. Dow, lower esophageal ring & mild gastritis     HERNIORRHAPHY INGUINAL Right 9/26/2016    Procedure: HERNIORRHAPHY INGUINAL;  Surgeon: Alexis Alexandra MD;  Location: [facility]     LAPAROSCOPIC CHOLECYSTECTOMY  12/03    for biliary sludge     REVERSE ARTHROPLASTY SHOULDER Right 2/25/2022    Procedure: RIGHT REVERSE SHOULDER ARTHROPLASTY, WITH OPEN REDUCTION INTERNAL FIXATION, WITH NO CUSTOM GUIDE;  Surgeon: Wiley Vargas MD;  Location:  OR     [facility] NONSPECIFIC PROCEDURE  5/02    Coronary artery bypass     RUST COLONOSCOPY THRU STOMA W BIOPSY/CAUTERY TUMOR/POLYP/LESION  5/03    Dr. Dow, Q 5 years     ZZ COLONOSCOPY THRU STOMA W BIOPSY/CAUTERY TUMOR/POLYP/LESION       Dr. Dow, one adenomatous polyp       FAMILY HISTORY:  Family History   Problem Relation Age of Onset     C.A.D. Father      Cancer Mother         breast cancer,  of pneumonia     Cerebrovascular Disease Son      CABG Son      Family History Negative Child      Family History Negative Sister      Heart Disease Brother        SOCIAL HISTORY:  Social History     Socioeconomic History     Marital status:      Spouse name: Kailey     Number of children: 3   Occupational History     Occupation: [employer]     Employer: RETIRED   Tobacco Use     Smoking status: Never Smoker     Smokeless tobacco: Never Used     Tobacco comment: smoked for a week in 20's   Vaping Use     Vaping Use: Never used   Substance and Sexual Activity     Alcohol use: Not Currently     Alcohol/week: 0.0 standard drinks     Drug use: No     Sexual activity: Yes     Partners: Female   Other Topics Concern      Service No     Blood Transfusions No     Exercise Yes     Seat Belt Yes     Self-Exams No     Parent/sibling w/ CABG, MI or angioplasty before 65F 55M? Yes   Social History Narrative    Balanced Diet - Yes    Osteoporosis Preventative measures-  Dairy servings per day: 2 to 3 servings daily    Regular Exercise -  Yes Describe walks 1.5 mile daily     Dental Exam up - YES - Date: 2 years ago    Eye Exam - YES - Date: 1 year ago    Self Testicular Exam -  No, handout given    Do you have any concerns about STD's -  No    Abuse: Current or Past (Physical, Sexual or Emotional)- No    Do you feel safe in your environment - Yes    Guns stored in the home - Yes, locked    Sunscreen used - No    Seatbelts used - Yes    Lipids - YES - Date: 3-09    Glucose -  YES - Date: 3-09    Colon Cancer Screening - Colonoscopy 3-08(date completed)    Hemoccults - UNKNOWN    PSA - YES - Date:     Digital Rectal Exam - UNKNOWN    Immunizations reviewed and up to date - Yes, td , had shingles vaccine    09  JANEY Patel CMA     "                 Social Determinants of Health     Financial Resource Strain: Low Risk      Difficulty of Paying Living Expenses: Not hard at all   Food Insecurity: No Food Insecurity     Worried About Running Out of Food in the Last Year: Never true     Ran Out of Food in the Last Year: Never true   Transportation Needs: No Transportation Needs     Lack of Transportation (Medical): No     Lack of Transportation (Non-Medical): No       Review of Systems:  Skin:  Negative     Eyes:  Positive for glasses  ENT:  Negative    Respiratory:  Negative shortness of breath;dyspnea on exertion  Cardiovascular:  chest pain;palpitations;Negative;dizziness;lightheadedness edema;Positive for  Gastroenterology: Negative    Genitourinary:  Negative    Musculoskeletal:  Negative back pain;neck pain  Neurologic:  Negative headaches  Psychiatric:       Heme/Lymph/Imm:  Negative allergies  Endocrine:  Negative      Physical Exam:  Vitals: BP (!) 150/78   Pulse 51   Ht 1.727 m (5' 8\")   Wt 72.1 kg (159 lb)   SpO2 96%   BMI 24.18 kg/m      Constitutional:  in no acute distress chronically ill      Skin:  warm and dry to the touch venous stasis changes pacemaker incision in the left infraclavicular area was well-healed      Head:  normocephalic, no masses or lesions        Eyes:  pupils equal and round, conjunctivae and lids unremarkable, sclera white, no xanthalasma, EOMS intact, no nystagmus        Lymph:No Cervical lymphadenopathy present     ENT:  no pallor or cyanosis, dentition good        Neck:  JVP normal        Respiratory:  normal breath sounds, clear to auscultation, normal A-P diameter, normal symmetry, normal respiratory excursion, no use of accessory muscles         Cardiac: apical impulse not displaced;normal S1 and S2 irregular rhythm soft or inaudible A2   systolic murmur grade 1        pulses below the femoral arteries are diminished                                      GI:  abdomen soft, non-tender, BS normoactive, " no mass, no HSM, no bruits        Extremities and Muscular Skeletal:  no deformities, clubbing, cyanosis, erythema observed   bilateral LE edema;trace;1+          Neurological:  no gross motor deficits        Psych:  Alert and Oriented x 3        Recent Lab Results:  LIPID RESULTS:  Lab Results   Component Value Date    CHOL 110 09/02/2021    CHOL 119 06/01/2020    HDL 43 09/02/2021    HDL 48 06/01/2020    LDL 57 09/02/2021    LDL 54 06/01/2020    TRIG 51 09/02/2021    TRIG 87 06/01/2020    CHOLHDLRATIO 3.0 07/07/2015       LIVER ENZYME RESULTS:  Lab Results   Component Value Date    AST 16 02/23/2022    AST 25 03/15/2021    ALT 22 02/23/2022    ALT 38 03/15/2021       CBC RESULTS:  Lab Results   Component Value Date    WBC 3.7 (L) 06/20/2022    WBC 3.2 (L) 06/07/2021    RBC 5.50 06/20/2022    RBC 4.89 06/07/2021    HGB 11.3 (L) 06/20/2022    HGB 10.3 (L) 06/07/2021    HCT 37.4 (L) 06/20/2022    HCT 33.5 (L) 06/07/2021    MCV 68 (L) 06/20/2022    MCV 69 (L) 06/07/2021    MCH 20.5 (L) 06/20/2022    MCH 21.1 (L) 06/07/2021    MCHC 30.2 (L) 06/20/2022    MCHC 30.7 (L) 06/07/2021    RDW 17.7 (H) 06/20/2022    RDW 16.9 (H) 06/07/2021     (L) 06/20/2022     (L) 06/07/2021       BMP RESULTS:  Lab Results   Component Value Date     06/20/2022     06/07/2021    POTASSIUM 4.5 06/20/2022    POTASSIUM 4.3 06/07/2021    CHLORIDE 106 06/20/2022    CHLORIDE 104 06/07/2021    CO2 27 06/20/2022    CO2 27 06/07/2021    ANIONGAP 4 06/20/2022    ANIONGAP 5 06/07/2021     (H) 06/20/2022    GLC 97 06/07/2021    BUN 55 (H) 06/20/2022    BUN 54 (H) 06/07/2021    CR 1.71 (H) 06/20/2022    CR 1.72 (H) 06/07/2021    GFRESTIMATED 38 (L) 06/20/2022    GFRESTIMATED 35 (L) 06/07/2021    GFRESTBLACK 41 (L) 06/07/2021    AYALA 9.1 06/20/2022    AYALA 8.9 06/07/2021        A1C RESULTS:  No results found for: A1C    INR RESULTS:  Lab Results   Component Value Date    INR 1.51 (H) 03/15/2021    INR 1.21 (H) 07/13/2020            CC  Amee Rocha PA-C  [id number]  [place] [address]            Thank you for allowing me to participate in the care of your patient.      Sincerely,     DR SANDRA ASTUDILLO MD     [facility]

## 2022-06-22 NOTE — PROGRESS NOTES
HPI and Plan:     1. 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 Currently appears just slightly volume up with some increased peripheral edema at a weight of 158 lbs. This is in the setting of inactivity after shoulder surgery.   2.  CKD-III; with a creatinine at baseline ~1.6.   3.  Hypertension, controlled.   4.  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  Mean gradient 9 mmHg from echo in 3-2022.  5.  Chronic atrial fibrillation with history of MAYA thrombus, on anticoagulation with Pradaxa.  6. CAD, s/p CABG. Denies angina.   7. Hyperlipidemia, well-controlled as of 9/2021.  8. JOSÉ ANTONIO diagnosed 2022.    He appears to be doing well at this time.  I am very happy to see that he has avoided hospitalizations for heart failure recently.    To new updates since we last saw him is that he had the right shoulder replacement.  He tells me this is working well.  He was also been diagnosed with obstructive sleep apnea recently and he is waiting for his CPAP machine.    Denies shortness of breath PND orthopnea ankle swelling chest pain.  Reports no bleeding.    Appears euvolemic on physical exam.  Weight steady 157 blood pressure at 150 systolic is just fine.    I asked me why lisinopril was stopped.  I reviewed his medical records I do see that it was stopped due to worsening renal function.  I am happy to tell him that his creatinine is actually improving a little as it was 2 and above when he stopped it and is now 1.71.  IIn any case with mildly reduced ejection fraction at the age of 87 I do not think lisinopril would make much difference.    Overall stable.  He will see Amee in 6 months time and myself in 1 years time for continued follow-up.    No orders of the defined types were placed in this encounter.      No orders of the defined types were placed in this encounter.      Encounter Diagnosis   Name Primary?     Chronic combined systolic and diastolic heart failure (H)         CURRENT MEDICATIONS:  Current Outpatient Medications   Medication Sig Dispense Refill     acetaminophen (TYLENOL) 500 MG tablet Take 1,000 mg by mouth 2 times daily       albuterol (PROAIR HFA/PROVENTIL HFA/VENTOLIN HFA) 108 (90 Base) MCG/ACT inhaler INHALE 1 TO 2 PUFFS BY MOUTH EVERY 4 TO 6 HOURS AS NEEDED 18 g 2     aspirin (ASA) 81 MG EC tablet Take 81 mg by mouth daily       budesonide (PULMICORT) 0.5 MG/2ML nebulizer solution Take 2 mLs (0.5 mg) by nebulization 2 times daily 120 mL 0     calcium carbonate 600 mg-vitamin D 400 units (CALTRATE) 600-400 MG-UNIT per tablet Take 1 tablet by mouth daily       dabigatran ANTICOAGULANT (PRADAXA) 150 MG capsule Take 1 capsule (150 mg) by mouth 2 times daily 180 capsule 0     ferrous sulfate (IRON) 325 (65 Fe) MG tablet TAKE 1 TABLET(325 MG) BY MOUTH TWICE DAILY 180 tablet 0     hydrALAZINE (APRESOLINE) 25 MG tablet Take 0.5 tablets (12.5 mg) by mouth 3 times daily 135 tablet 3     ipratropium - albuterol 0.5 mg/2.5 mg/3 mL (DUONEB) 0.5-2.5 (3) MG/3ML nebulization Inhale 1 vial (3 mLs) into the lungs 4 times daily 360 mL 11     isosorbide mononitrate (IMDUR) 30 MG 24 hr tablet Take 1 tablet (30 mg) by mouth daily 90 tablet 1     magnesium chloride (MAG64) 535 (64 Mg) MG TBEC CR tablet Take 1 tablet (535 mg) by mouth 2 times daily 180 tablet 3     melatonin 3 MG tablet Take 1 tablet (3 mg) by mouth nightly as needed for sleep 30 tablet 0     metoprolol tartrate (LOPRESSOR) 25 MG tablet TAKE 1 TABLET BY MOUTH TWICE A  tablet 1     montelukast (SINGULAIR) 10 MG tablet Take 1 tablet (10 mg) by mouth daily 90 tablet 1     multivitamin, therapeutic (THERA-VIT) TABS tablet Take 1 tablet by mouth every evening        nitroGLYcerin (NITROSTAT) 0.4 MG sublingual tablet For chest pain place 1 tablet under the tongue every 5 minutes for 3 doses. If symptoms persist 5 minutes after 1st dose call 911. 12 tablet 0     pantoprazole (PROTONIX) 40 MG EC tablet TAKE 1 TABLET  BEFORE MEALS TWICE A  tablet 1     simvastatin (ZOCOR) 40 MG tablet Take 1 tablet (40 mg) by mouth At Bedtime 90 tablet 3     torsemide (DEMADEX) 5 MG tablet Take 2 tablets (10 mg) by mouth daily (Patient taking differently: Take 10 mg by mouth daily Patient taking 10 mg in the morning and 5 mg at night) 180 tablet 0       ALLERGIES   No Known Allergies    PAST MEDICAL HISTORY:  Past Medical History:   Diagnosis Date     Allergic rhinitis, cause unspecified      Anemia, unspecified      Aortic stenosis S/P TAVR      Benign neoplasm of colon 1999    Ademonatous polyp     CHF 2nd to TAVR -- S/P Pacemaker 12/2020     CKD (chronic kidney disease) stage 3, GFR 30-59 ml/min (H) 05/12/2011     Community acquired pneumonia 9/29/2020     Coronary atherosclerosis of unspecified type of vessel, native or graft     s/p CABG 2002     Esophageal ulcer w UGI bleed 05/24/2020    Had EGD adn caurtery 5/24/20, colonoscopy with diverticulosis then     Hyperlipidemia LDL goal < 100      Hypertension goal BP (blood pressure) < 140/90      Localized osteoarthrosis not specified whether primary or secondary, lower leg     left knee     Mild persistent asthma without complication 1/11/2016     Moderate persistent asthma      Persistent atrial fibrillation (H)      Unspecified hearing loss        PAST SURGICAL HISTORY:  Past Surgical History:   Procedure Laterality Date     CARDIAC SURGERY       COLONOSCOPY N/A 5/26/2020    Procedure: COLONOSCOPY;  Surgeon: Ignacio Fournier MD;  Location:  GI     CV ANGIOGRAM CORONARY GRAFT N/A 4/24/2020    Procedure: Angiogram Coronary Graft;  Surgeon: Addison Willard MD;  Location: [facility] CARDIAC CATH LAB     CV CORONARY ANGIOGRAM N/A 4/24/2020    Procedure: Coronary Angiogram;  Surgeon: Addison Willard MD;  Location:  HEART CARDIAC CATH LAB     CV RIGHT HEART CATH MEASUREMENTS RECORDED N/A 4/24/2020    Procedure: Right Heart Cath;  Surgeon: Addison Willard MD;   Location:  HEART CARDIAC CATH LAB     CV TRANSCATHETER AORTIC VALVE REPLACEMENT N/A 2020    Procedure: Transcatheter Aortic Valve Replacement;  Surgeon: Soraida Monet MD;  Location:  HEART CARDIAC CATH LAB     EP PACEMAKER N/A 7/15/2020    Procedure: EP Pacemaker;  Surgeon: Tommie Sauer MD;  Location:  HEART CARDIAC CATH LAB     HC ESOPHAGOSCOPY, DIAGNOSTIC      Dr. Dow, lower esophageal ring & mild gastritis     HERNIORRHAPHY INGUINAL Right 2016    Procedure: HERNIORRHAPHY INGUINAL;  Surgeon: Alexis Alexandra MD;  Location: [facility]     LAPAROSCOPIC CHOLECYSTECTOMY      for biliary sludge     REVERSE ARTHROPLASTY SHOULDER Right 2022    Procedure: RIGHT REVERSE SHOULDER ARTHROPLASTY, WITH OPEN REDUCTION INTERNAL FIXATION, WITH NO CUSTOM GUIDE;  Surgeon: Wiley Vargas MD;  Location:  OR     [facility] NONSPECIFIC PROCEDURE      Coronary artery bypass     ZZHC COLONOSCOPY THRU STOMA W BIOPSY/CAUTERY TUMOR/POLYP/LESION      Dr. Dow, Q 5 years     ZZHC COLONOSCOPY THRU STOMA W BIOPSY/CAUTERY TUMOR/POLYP/LESION      Dr. Dow, one adenomatous polyp       FAMILY HISTORY:  Family History   Problem Relation Age of Onset     C.A.D. Father      Cancer Mother         breast cancer,  of pneumonia     Cerebrovascular Disease Son      CABG Son      Family History Negative Child      Family History Negative Sister      Heart Disease Brother        SOCIAL HISTORY:  Social History     Socioeconomic History     Marital status:      Spouse name: Kailey     Number of children: 3   Occupational History     Occupation: [employer]     Employer: RETIRED   Tobacco Use     Smoking status: Never Smoker     Smokeless tobacco: Never Used     Tobacco comment: smoked for a week in 20's   Vaping Use     Vaping Use: Never used   Substance and Sexual Activity     Alcohol use: Not Currently     Alcohol/week: 0.0 standard drinks     Drug use: No     Sexual activity:  Yes     Partners: Female   Other Topics Concern      Service No     Blood Transfusions No     Exercise Yes     Seat Belt Yes     Self-Exams No     Parent/sibling w/ CABG, MI or angioplasty before 65F 55M? Yes   Social History Narrative    Balanced Diet - Yes    Osteoporosis Preventative measures-  Dairy servings per day: 2 to 3 servings daily    Regular Exercise -  Yes Describe walks 1.5 mile daily     Dental Exam up - YES - Date: 2 years ago    Eye Exam - YES - Date: 1 year ago    Self Testicular Exam -  No, handout given    Do you have any concerns about STD's -  No    Abuse: Current or Past (Physical, Sexual or Emotional)- No    Do you feel safe in your environment - Yes    Guns stored in the home - Yes, locked    Sunscreen used - No    Seatbelts used - Yes    Lipids - YES - Date: 3-09    Glucose -  YES - Date: 3-09    Colon Cancer Screening - Colonoscopy 3-08(date completed)    Hemoccults - UNKNOWN    PSA - YES - Date: 11-07    Digital Rectal Exam - UNKNOWN    Immunizations reviewed and up to date - Yes, td 1-2005, had shingles vaccine    5-28-09  JANEY Patel [facility]                     Social Determinants of Health     Financial Resource Strain: Low Risk      Difficulty of Paying Living Expenses: Not hard at all   Food Insecurity: No Food Insecurity     Worried About Running Out of Food in the Last Year: Never true     Ran Out of Food in the Last Year: Never true   Transportation Needs: No Transportation Needs     Lack of Transportation (Medical): No     Lack of Transportation (Non-Medical): No       Review of Systems:  Skin:  Negative     Eyes:  Positive for glasses  ENT:  Negative    Respiratory:  Negative shortness of breath;dyspnea on exertion  Cardiovascular:  chest pain;palpitations;Negative;dizziness;lightheadedness edema;Positive for  Gastroenterology: Negative    Genitourinary:  Negative    Musculoskeletal:  Negative back pain;neck pain  Neurologic:  Negative headaches  Psychiatric:      "  Heme/Lymph/Imm:  Negative allergies  Endocrine:  Negative      Physical Exam:  Vitals: BP (!) 150/78   Pulse 51   Ht 1.727 m (5' 8\")   Wt 72.1 kg (159 lb)   SpO2 96%   BMI 24.18 kg/m      Constitutional:           Skin:             Head:           Eyes:           Lymph:      ENT:           Neck:           Respiratory:            Cardiac:                                                           GI:           Extremities and Muscular Skeletal:                 Neurological:           Psych:           Recent Lab Results:  LIPID RESULTS:  Lab Results   Component Value Date    CHOL 110 09/02/2021    CHOL 119 06/01/2020    HDL 43 09/02/2021    HDL 48 06/01/2020    LDL 57 09/02/2021    LDL 54 06/01/2020    TRIG 51 09/02/2021    TRIG 87 06/01/2020    CHOLHDLRATIO 3.0 07/07/2015       LIVER ENZYME RESULTS:  Lab Results   Component Value Date    AST 16 02/23/2022    AST 25 03/15/2021    ALT 22 02/23/2022    ALT 38 03/15/2021       CBC RESULTS:  Lab Results   Component Value Date    WBC 3.7 (L) 06/20/2022    WBC 3.2 (L) 06/07/2021    RBC 5.50 06/20/2022    RBC 4.89 06/07/2021    HGB 11.3 (L) 06/20/2022    HGB 10.3 (L) 06/07/2021    HCT 37.4 (L) 06/20/2022    HCT 33.5 (L) 06/07/2021    MCV 68 (L) 06/20/2022    MCV 69 (L) 06/07/2021    MCH 20.5 (L) 06/20/2022    MCH 21.1 (L) 06/07/2021    MCHC 30.2 (L) 06/20/2022    MCHC 30.7 (L) 06/07/2021    RDW 17.7 (H) 06/20/2022    RDW 16.9 (H) 06/07/2021     (L) 06/20/2022     (L) 06/07/2021       BMP RESULTS:  Lab Results   Component Value Date     06/20/2022     06/07/2021    POTASSIUM 4.5 06/20/2022    POTASSIUM 4.3 06/07/2021    CHLORIDE 106 06/20/2022    CHLORIDE 104 06/07/2021    CO2 27 06/20/2022    CO2 27 06/07/2021    ANIONGAP 4 06/20/2022    ANIONGAP 5 06/07/2021     (H) 06/20/2022    GLC 97 06/07/2021    BUN 55 (H) 06/20/2022    BUN 54 (H) 06/07/2021    CR 1.71 (H) 06/20/2022    CR 1.72 (H) 06/07/2021    GFRESTIMATED 38 (L) 06/20/2022    " GFRESTIMATED 35 (L) 06/07/2021    GFRESTBLACK 41 (L) 06/07/2021    AYALA 9.1 06/20/2022    AYALA 8.9 06/07/2021        A1C RESULTS:  No results found for: A1C    INR RESULTS:  Lab Results   Component Value Date    INR 1.51 (H) 03/15/2021    INR 1.21 (H) 07/13/2020           CC  Amee Rocha PA-C  [id number]  [place] [address]            HPI and Plan:   See dictation    No orders of the defined types were placed in this encounter.      No orders of the defined types were placed in this encounter.      Encounter Diagnosis   Name Primary?     Chronic combined systolic and diastolic heart failure (H)        CURRENT MEDICATIONS:  Current Outpatient Medications   Medication Sig Dispense Refill     acetaminophen (TYLENOL) 500 MG tablet Take 1,000 mg by mouth 2 times daily       albuterol (PROAIR HFA/PROVENTIL HFA/VENTOLIN HFA) 108 (90 Base) MCG/ACT inhaler INHALE 1 TO 2 PUFFS BY MOUTH EVERY 4 TO 6 HOURS AS NEEDED 18 g 2     aspirin (ASA) 81 MG EC tablet Take 81 mg by mouth daily       budesonide (PULMICORT) 0.5 MG/2ML nebulizer solution Take 2 mLs (0.5 mg) by nebulization 2 times daily 120 mL 0     calcium carbonate 600 mg-vitamin D 400 units (CALTRATE) 600-400 MG-UNIT per tablet Take 1 tablet by mouth daily       dabigatran ANTICOAGULANT (PRADAXA) 150 MG capsule Take 1 capsule (150 mg) by mouth 2 times daily 180 capsule 0     ferrous sulfate (IRON) 325 (65 Fe) MG tablet TAKE 1 TABLET(325 MG) BY MOUTH TWICE DAILY 180 tablet 0     hydrALAZINE (APRESOLINE) 25 MG tablet Take 0.5 tablets (12.5 mg) by mouth 3 times daily 135 tablet 3     ipratropium - albuterol 0.5 mg/2.5 mg/3 mL (DUONEB) 0.5-2.5 (3) MG/3ML nebulization Inhale 1 vial (3 mLs) into the lungs 4 times daily 360 mL 11     isosorbide mononitrate (IMDUR) 30 MG 24 hr tablet Take 1 tablet (30 mg) by mouth daily 90 tablet 1     magnesium chloride (MAG64) 535 (64 Mg) MG TBEC CR tablet Take 1 tablet (535 mg) by mouth 2 times daily 180 tablet 3      melatonin 3 MG tablet Take 1 tablet (3 mg) by mouth nightly as needed for sleep 30 tablet 0     metoprolol tartrate (LOPRESSOR) 25 MG tablet TAKE 1 TABLET BY MOUTH TWICE A  tablet 1     montelukast (SINGULAIR) 10 MG tablet Take 1 tablet (10 mg) by mouth daily 90 tablet 1     multivitamin, therapeutic (THERA-VIT) TABS tablet Take 1 tablet by mouth every evening        nitroGLYcerin (NITROSTAT) 0.4 MG sublingual tablet For chest pain place 1 tablet under the tongue every 5 minutes for 3 doses. If symptoms persist 5 minutes after 1st dose call 911. 12 tablet 0     pantoprazole (PROTONIX) 40 MG EC tablet TAKE 1 TABLET BEFORE MEALS TWICE A  tablet 1     simvastatin (ZOCOR) 40 MG tablet Take 1 tablet (40 mg) by mouth At Bedtime 90 tablet 3     torsemide (DEMADEX) 5 MG tablet Take 2 tablets (10 mg) by mouth daily (Patient taking differently: Take 10 mg by mouth daily Patient taking 10 mg in the morning and 5 mg at night) 180 tablet 0       ALLERGIES   No Known Allergies    PAST MEDICAL HISTORY:  Past Medical History:   Diagnosis Date     Allergic rhinitis, cause unspecified      Anemia, unspecified      Aortic stenosis S/P TAVR      Benign neoplasm of colon 1999    Ademonatous polyp     CHF 2nd to TAVR -- S/P Pacemaker 12/2020     CKD (chronic kidney disease) stage 3, GFR 30-59 ml/min (H) 05/12/2011     Community acquired pneumonia 9/29/2020     Coronary atherosclerosis of unspecified type of vessel, native or graft     s/p CABG 2002     Esophageal ulcer w UGI bleed 05/24/2020    Had EGD adn caurtery 5/24/20, colonoscopy with diverticulosis then     Hyperlipidemia LDL goal < 100      Hypertension goal BP (blood pressure) < 140/90      Localized osteoarthrosis not specified whether primary or secondary, lower leg     left knee     Mild persistent asthma without complication 1/11/2016     Moderate persistent asthma      Persistent atrial fibrillation (H)      Unspecified hearing loss        PAST SURGICAL  HISTORY:  Past Surgical History:   Procedure Laterality Date     CARDIAC SURGERY       COLONOSCOPY N/A 2020    Procedure: COLONOSCOPY;  Surgeon: Ignacio Fournier MD;  Location:  GI     CV ANGIOGRAM CORONARY GRAFT N/A 2020    Procedure: Angiogram Coronary Graft;  Surgeon: Addison Willard MD;  Location:  HEART CARDIAC CATH LAB     CV CORONARY ANGIOGRAM N/A 2020    Procedure: Coronary Angiogram;  Surgeon: Addison Willard MD;  Location:  HEART CARDIAC CATH LAB     CV RIGHT HEART CATH MEASUREMENTS RECORDED N/A 2020    Procedure: Right Heart Cath;  Surgeon: Addison Willard MD;  Location:  HEART CARDIAC CATH LAB     CV TRANSCATHETER AORTIC VALVE REPLACEMENT N/A 2020    Procedure: Transcatheter Aortic Valve Replacement;  Surgeon: Soraida Monet MD;  Location:  HEART CARDIAC CATH LAB     EP PACEMAKER N/A 7/15/2020    Procedure: EP Pacemaker;  Surgeon: Tommie Sauer MD;  Location:  HEART CARDIAC CATH LAB     HC ESOPHAGOSCOPY, DIAGNOSTIC      Dr. Dow, lower esophageal ring & mild gastritis     HERNIORRHAPHY INGUINAL Right 2016    Procedure: HERNIORRHAPHY INGUINAL;  Surgeon: Alexis Alexandra MD;  Location: [facility]     LAPAROSCOPIC CHOLECYSTECTOMY      for biliary sludge     REVERSE ARTHROPLASTY SHOULDER Right 2022    Procedure: RIGHT REVERSE SHOULDER ARTHROPLASTY, WITH OPEN REDUCTION INTERNAL FIXATION, WITH NO CUSTOM GUIDE;  Surgeon: Wiley Vargas MD;  Location:  OR     [facility] NONSPECIFIC PROCEDURE      Coronary artery bypass     ZZ COLONOSCOPY THRU STOMA W BIOPSY/CAUTERY TUMOR/POLYP/LESION      Dr. Dow, Q 5 years     ZZHC COLONOSCOPY THRU STOMA W BIOPSY/CAUTERY TUMOR/POLYP/LESION      Dr. Dow, one adenomatous polyp       FAMILY HISTORY:  Family History   Problem Relation Age of Onset     C.A.D. Father      Cancer Mother         breast cancer,  of pneumonia     Cerebrovascular Disease Son       CABG Son      Family History Negative Child      Family History Negative Sister      Heart Disease Brother        SOCIAL HISTORY:  Social History     Socioeconomic History     Marital status:      Spouse name: Kailey     Number of children: 3   Occupational History     Occupation: [employer]     Employer: RETIRED   Tobacco Use     Smoking status: Never Smoker     Smokeless tobacco: Never Used     Tobacco comment: smoked for a week in 20's   Vaping Use     Vaping Use: Never used   Substance and Sexual Activity     Alcohol use: Not Currently     Alcohol/week: 0.0 standard drinks     Drug use: No     Sexual activity: Yes     Partners: Female   Other Topics Concern      Service No     Blood Transfusions No     Exercise Yes     Seat Belt Yes     Self-Exams No     Parent/sibling w/ CABG, MI or angioplasty before 65F 55M? Yes   Social History Narrative    Balanced Diet - Yes    Osteoporosis Preventative measures-  Dairy servings per day: 2 to 3 servings daily    Regular Exercise -  Yes Describe walks 1.5 mile daily     Dental Exam up - YES - Date: 2 years ago    Eye Exam - YES - Date: 1 year ago    Self Testicular Exam -  No, handout given    Do you have any concerns about STD's -  No    Abuse: Current or Past (Physical, Sexual or Emotional)- No    Do you feel safe in your environment - Yes    Guns stored in the home - Yes, locked    Sunscreen used - No    Seatbelts used - Yes    Lipids - YES - Date: 3-09    Glucose -  YES - Date: 3-09    Colon Cancer Screening - Colonoscopy 3-08(date completed)    Hemoccults - UNKNOWN    PSA - YES - Date: 11-07    Digital Rectal Exam - UNKNOWN    Immunizations reviewed and up to date - Yes, td 1-2005, had shingles vaccine    5-28-09  JANEY Patel CMA                     Social Determinants of Health     Financial Resource Strain: Low Risk      Difficulty of Paying Living Expenses: Not hard at all   Food Insecurity: No Food Insecurity     Worried About Running Out of Food in the  "Last Year: Never true     Ran Out of Food in the Last Year: Never true   Transportation Needs: No Transportation Needs     Lack of Transportation (Medical): No     Lack of Transportation (Non-Medical): No       Review of Systems:  Skin:  Negative     Eyes:  Positive for glasses  ENT:  Negative    Respiratory:  Negative shortness of breath;dyspnea on exertion  Cardiovascular:  chest pain;palpitations;Negative;dizziness;lightheadedness edema;Positive for  Gastroenterology: Negative    Genitourinary:  Negative    Musculoskeletal:  Negative back pain;neck pain  Neurologic:  Negative headaches  Psychiatric:       Heme/Lymph/Imm:  Negative allergies  Endocrine:  Negative      Physical Exam:  Vitals: BP (!) 150/78   Pulse 51   Ht 1.727 m (5' 8\")   Wt 72.1 kg (159 lb)   SpO2 96%   BMI 24.18 kg/m      Constitutional:  in no acute distress chronically ill      Skin:  warm and dry to the touch venous stasis changes pacemaker incision in the left infraclavicular area was well-healed      Head:  normocephalic, no masses or lesions        Eyes:  pupils equal and round, conjunctivae and lids unremarkable, sclera white, no xanthalasma, EOMS intact, no nystagmus        Lymph:No Cervical lymphadenopathy present     ENT:  no pallor or cyanosis, dentition good        Neck:  JVP normal        Respiratory:  normal breath sounds, clear to auscultation, normal A-P diameter, normal symmetry, normal respiratory excursion, no use of accessory muscles         Cardiac: apical impulse not displaced;normal S1 and S2 irregular rhythm soft or inaudible A2   systolic murmur grade 1        pulses below the femoral arteries are diminished                                      GI:  abdomen soft, non-tender, BS normoactive, no mass, no HSM, no bruits        Extremities and Muscular Skeletal:  no deformities, clubbing, cyanosis, erythema observed   bilateral LE edema;trace;1+          Neurological:  no gross motor deficits        Psych:  Alert and " Oriented x 3        Recent Lab Results:  LIPID RESULTS:  Lab Results   Component Value Date    CHOL 110 09/02/2021    CHOL 119 06/01/2020    HDL 43 09/02/2021    HDL 48 06/01/2020    LDL 57 09/02/2021    LDL 54 06/01/2020    TRIG 51 09/02/2021    TRIG 87 06/01/2020    CHOLHDLRATIO 3.0 07/07/2015       LIVER ENZYME RESULTS:  Lab Results   Component Value Date    AST 16 02/23/2022    AST 25 03/15/2021    ALT 22 02/23/2022    ALT 38 03/15/2021       CBC RESULTS:  Lab Results   Component Value Date    WBC 3.7 (L) 06/20/2022    WBC 3.2 (L) 06/07/2021    RBC 5.50 06/20/2022    RBC 4.89 06/07/2021    HGB 11.3 (L) 06/20/2022    HGB 10.3 (L) 06/07/2021    HCT 37.4 (L) 06/20/2022    HCT 33.5 (L) 06/07/2021    MCV 68 (L) 06/20/2022    MCV 69 (L) 06/07/2021    MCH 20.5 (L) 06/20/2022    MCH 21.1 (L) 06/07/2021    MCHC 30.2 (L) 06/20/2022    MCHC 30.7 (L) 06/07/2021    RDW 17.7 (H) 06/20/2022    RDW 16.9 (H) 06/07/2021     (L) 06/20/2022     (L) 06/07/2021       BMP RESULTS:  Lab Results   Component Value Date     06/20/2022     06/07/2021    POTASSIUM 4.5 06/20/2022    POTASSIUM 4.3 06/07/2021    CHLORIDE 106 06/20/2022    CHLORIDE 104 06/07/2021    CO2 27 06/20/2022    CO2 27 06/07/2021    ANIONGAP 4 06/20/2022    ANIONGAP 5 06/07/2021     (H) 06/20/2022    GLC 97 06/07/2021    BUN 55 (H) 06/20/2022    BUN 54 (H) 06/07/2021    CR 1.71 (H) 06/20/2022    CR 1.72 (H) 06/07/2021    GFRESTIMATED 38 (L) 06/20/2022    GFRESTIMATED 35 (L) 06/07/2021    GFRESTBLACK 41 (L) 06/07/2021    AYALA 9.1 06/20/2022    AYALA 8.9 06/07/2021        A1C RESULTS:  No results found for: A1C    INR RESULTS:  Lab Results   Component Value Date    INR 1.51 (H) 03/15/2021    INR 1.21 (H) 07/13/2020           CC  Amee Rocha PA-C  [id number]  [place] [address]

## 2022-06-23 DIAGNOSIS — I50.9 ACUTE ON CHRONIC CONGESTIVE HEART FAILURE, UNSPECIFIED HEART FAILURE TYPE (H): ICD-10-CM

## 2022-06-23 RX ORDER — ISOSORBIDE MONONITRATE 30 MG/1
30 TABLET, EXTENDED RELEASE ORAL DAILY
Qty: 90 TABLET | Refills: 3 | Status: SHIPPED | OUTPATIENT
Start: 2022-06-23 | End: 2023-05-02

## 2022-06-24 RX ORDER — METOPROLOL TARTRATE 25 MG/1
TABLET, FILM COATED ORAL
Qty: 180 TABLET | Refills: 1 | Status: SHIPPED | OUTPATIENT
Start: 2022-06-24 | End: 2022-12-13

## 2022-06-24 RX ORDER — PANTOPRAZOLE SODIUM 40 MG/1
TABLET, DELAYED RELEASE ORAL
Qty: 180 TABLET | Refills: 1 | Status: SHIPPED | OUTPATIENT
Start: 2022-06-24 | End: 2022-12-13

## 2022-06-24 NOTE — TELEPHONE ENCOUNTER
Routing refill request to provider for review/approval because:   Blood pressure under 140/90 in past 12 months   No diagnosis of osteoporosis on record    Last Written Prescription Date: Dec/Jan 2021  Last Fill Quantity: 90 days,  # refills: 1   Last office visit: 4/26/2022 with prescribing provider:  Jennifer Baker Office Visit:  10/31/22    BARNEY Macias RN  [facility]

## 2022-07-22 ENCOUNTER — OFFICE VISIT (OUTPATIENT)
Dept: INTERPRETER SERVICES | Facility: CLINIC | Age: 87
End: 2022-07-22

## 2022-07-26 DIAGNOSIS — I25.10 CORONARY ARTERY DISEASE INVOLVING NATIVE CORONARY ARTERY OF NATIVE HEART WITHOUT ANGINA PECTORIS: ICD-10-CM

## 2022-07-26 DIAGNOSIS — I50.9 ACUTE ON CHRONIC CONGESTIVE HEART FAILURE, UNSPECIFIED HEART FAILURE TYPE (H): ICD-10-CM

## 2022-07-26 RX ORDER — HYDRALAZINE HYDROCHLORIDE 25 MG/1
12.5 TABLET, FILM COATED ORAL 3 TIMES DAILY
Qty: 135 TABLET | Refills: 3 | Status: SHIPPED | OUTPATIENT
Start: 2022-07-26 | End: 2023-07-31

## 2022-07-26 RX ORDER — DABIGATRAN ETEXILATE 150 MG/1
150 CAPSULE ORAL 2 TIMES DAILY
Qty: 180 CAPSULE | Refills: 3 | Status: SHIPPED | OUTPATIENT
Start: 2022-07-26 | End: 2023-08-29

## 2022-07-26 NOTE — TELEPHONE ENCOUNTER
Received refill request for:  Pradaxa, Hydralazine  Last OV was: 22 Dr. Field  Labs/EK22 CBC  F/U scheduled: Orders for DIAZ 2022 and MD 2023  Pharmacy: [facility] Cardiology Refill Guideline reviewed.  Medication meets criteria for refill.    Rhoda Hager RN, BSN  22 at 12:48 PM

## 2022-07-29 ENCOUNTER — ANCILLARY PROCEDURE (OUTPATIENT)
Dept: CARDIOLOGY | Facility: CLINIC | Age: 87
End: 2022-07-29
Attending: INTERNAL MEDICINE
Payer: COMMERCIAL

## 2022-07-29 DIAGNOSIS — Z95.0 CARDIAC PACEMAKER IN SITU: ICD-10-CM

## 2022-07-29 DIAGNOSIS — Z95.0 CARDIAC PACEMAKER IN SITU: Primary | ICD-10-CM

## 2022-07-29 DIAGNOSIS — I49.5 SSS (SICK SINUS SYNDROME) (H): ICD-10-CM

## 2022-07-29 PROCEDURE — 93294 REM INTERROG EVL PM/LDLS PM: CPT | Performed by: INTERNAL MEDICINE

## 2022-07-29 PROCEDURE — 93296 REM INTERROG EVL PM/IDS: CPT | Performed by: INTERNAL MEDICINE

## 2022-07-31 LAB
MDC_IDC_LEAD_IMPLANT_DT: NORMAL
MDC_IDC_LEAD_LOCATION: NORMAL
MDC_IDC_LEAD_LOCATION_DETAIL_1: NORMAL
MDC_IDC_LEAD_MFG: NORMAL
MDC_IDC_LEAD_MODEL: NORMAL
MDC_IDC_LEAD_POLARITY_TYPE: NORMAL
MDC_IDC_LEAD_SERIAL: NORMAL
MDC_IDC_MSMT_BATTERY_DTM: NORMAL
MDC_IDC_MSMT_BATTERY_REMAINING_LONGEVITY: 159 MO
MDC_IDC_MSMT_BATTERY_RRT_TRIGGER: 2.62
MDC_IDC_MSMT_BATTERY_STATUS: NORMAL
MDC_IDC_MSMT_BATTERY_VOLTAGE: 3.05 V
MDC_IDC_MSMT_LEADCHNL_RV_IMPEDANCE_VALUE: 304 OHM
MDC_IDC_MSMT_LEADCHNL_RV_IMPEDANCE_VALUE: 532 OHM
MDC_IDC_MSMT_LEADCHNL_RV_PACING_THRESHOLD_AMPLITUDE: 1.12 V
MDC_IDC_MSMT_LEADCHNL_RV_PACING_THRESHOLD_PULSEWIDTH: 0.4 MS
MDC_IDC_MSMT_LEADCHNL_RV_SENSING_INTR_AMPL: 8.12 MV
MDC_IDC_MSMT_LEADCHNL_RV_SENSING_INTR_AMPL: 8.12 MV
MDC_IDC_PG_IMPLANT_DTM: NORMAL
MDC_IDC_PG_MFG: NORMAL
MDC_IDC_PG_MODEL: NORMAL
MDC_IDC_PG_SERIAL: NORMAL
MDC_IDC_PG_TYPE: NORMAL
MDC_IDC_SESS_CLINIC_NAME: NORMAL
MDC_IDC_SESS_DTM: NORMAL
MDC_IDC_SESS_TYPE: NORMAL
MDC_IDC_SET_BRADY_HYSTRATE: NORMAL
MDC_IDC_SET_BRADY_LOWRATE: 50 {BEATS}/MIN
MDC_IDC_SET_BRADY_MODE: NORMAL
MDC_IDC_SET_LEADCHNL_RV_PACING_AMPLITUDE: 2.25 V
MDC_IDC_SET_LEADCHNL_RV_PACING_ANODE_ELECTRODE_1: NORMAL
MDC_IDC_SET_LEADCHNL_RV_PACING_ANODE_LOCATION_1: NORMAL
MDC_IDC_SET_LEADCHNL_RV_PACING_CAPTURE_MODE: NORMAL
MDC_IDC_SET_LEADCHNL_RV_PACING_CATHODE_ELECTRODE_1: NORMAL
MDC_IDC_SET_LEADCHNL_RV_PACING_CATHODE_LOCATION_1: NORMAL
MDC_IDC_SET_LEADCHNL_RV_PACING_POLARITY: NORMAL
MDC_IDC_SET_LEADCHNL_RV_PACING_PULSEWIDTH: 0.4 MS
MDC_IDC_SET_LEADCHNL_RV_SENSING_ANODE_ELECTRODE_1: NORMAL
MDC_IDC_SET_LEADCHNL_RV_SENSING_ANODE_LOCATION_1: NORMAL
MDC_IDC_SET_LEADCHNL_RV_SENSING_CATHODE_ELECTRODE_1: NORMAL
MDC_IDC_SET_LEADCHNL_RV_SENSING_CATHODE_LOCATION_1: NORMAL
MDC_IDC_SET_LEADCHNL_RV_SENSING_POLARITY: NORMAL
MDC_IDC_SET_LEADCHNL_RV_SENSING_SENSITIVITY: 0.9 MV
MDC_IDC_SET_ZONE_DETECTION_INTERVAL: 360 MS
MDC_IDC_SET_ZONE_TYPE: NORMAL
MDC_IDC_STAT_BRADY_DTM_END: NORMAL
MDC_IDC_STAT_BRADY_DTM_START: NORMAL
MDC_IDC_STAT_BRADY_RV_PERCENT_PACED: 47.18 %
MDC_IDC_STAT_EPISODE_RECENT_COUNT: 0
MDC_IDC_STAT_EPISODE_RECENT_COUNT_DTM_END: NORMAL
MDC_IDC_STAT_EPISODE_RECENT_COUNT_DTM_START: NORMAL
MDC_IDC_STAT_EPISODE_TOTAL_COUNT: 0
MDC_IDC_STAT_EPISODE_TOTAL_COUNT: 0
MDC_IDC_STAT_EPISODE_TOTAL_COUNT: 3
MDC_IDC_STAT_EPISODE_TOTAL_COUNT_DTM_END: NORMAL
MDC_IDC_STAT_EPISODE_TOTAL_COUNT_DTM_START: NORMAL
MDC_IDC_STAT_EPISODE_TYPE: NORMAL

## 2022-09-06 ENCOUNTER — OFFICE VISIT (OUTPATIENT)
Dept: FAMILY MEDICINE | Facility: CLINIC | Age: 87
End: 2022-09-06
Payer: COMMERCIAL

## 2022-09-06 VITALS
OXYGEN SATURATION: 92 % | TEMPERATURE: 97.5 F | HEART RATE: 54 BPM | SYSTOLIC BLOOD PRESSURE: 160 MMHG | BODY MASS INDEX: 24.4 KG/M2 | RESPIRATION RATE: 14 BRPM | HEIGHT: 68 IN | WEIGHT: 161 LBS | DIASTOLIC BLOOD PRESSURE: 72 MMHG

## 2022-09-06 DIAGNOSIS — N18.32 STAGE 3B CHRONIC KIDNEY DISEASE (H): ICD-10-CM

## 2022-09-06 DIAGNOSIS — I10 HYPERTENSION GOAL BP (BLOOD PRESSURE) < 140/90: ICD-10-CM

## 2022-09-06 DIAGNOSIS — E78.5 HYPERLIPIDEMIA WITH TARGET LDL LESS THAN 100: ICD-10-CM

## 2022-09-06 DIAGNOSIS — I50.31 ACUTE DIASTOLIC HEART FAILURE (H): Primary | ICD-10-CM

## 2022-09-06 DIAGNOSIS — Z95.2 S/P TAVR (TRANSCATHETER AORTIC VALVE REPLACEMENT): ICD-10-CM

## 2022-09-06 LAB
ANION GAP SERPL CALCULATED.3IONS-SCNC: 6 MMOL/L (ref 3–14)
BUN SERPL-MCNC: 51 MG/DL (ref 7–30)
CALCIUM SERPL-MCNC: 9.2 MG/DL (ref 8.5–10.1)
CHLORIDE BLD-SCNC: 108 MMOL/L (ref 94–109)
CHOLEST SERPL-MCNC: 103 MG/DL
CO2 SERPL-SCNC: 26 MMOL/L (ref 20–32)
CREAT SERPL-MCNC: 1.66 MG/DL (ref 0.66–1.25)
CREAT UR-MCNC: 75 MG/DL
FASTING STATUS PATIENT QL REPORTED: YES
GFR SERPL CREATININE-BSD FRML MDRD: 40 ML/MIN/1.73M2
GLUCOSE BLD-MCNC: 109 MG/DL (ref 70–99)
HDLC SERPL-MCNC: 44 MG/DL
LDLC SERPL CALC-MCNC: 48 MG/DL
MICROALBUMIN UR-MCNC: 137 MG/L
MICROALBUMIN/CREAT UR: 182.67 MG/G CR (ref 0–17)
NONHDLC SERPL-MCNC: 59 MG/DL
NT-PROBNP SERPL-MCNC: 4140 PG/ML (ref 0–450)
POTASSIUM BLD-SCNC: 4.5 MMOL/L (ref 3.4–5.3)
SODIUM SERPL-SCNC: 140 MMOL/L (ref 133–144)
TRIGL SERPL-MCNC: 54 MG/DL

## 2022-09-06 PROCEDURE — 99214 OFFICE O/P EST MOD 30 MIN: CPT | Performed by: FAMILY MEDICINE

## 2022-09-06 PROCEDURE — 80048 BASIC METABOLIC PNL TOTAL CA: CPT | Performed by: FAMILY MEDICINE

## 2022-09-06 PROCEDURE — 82043 UR ALBUMIN QUANTITATIVE: CPT | Performed by: FAMILY MEDICINE

## 2022-09-06 PROCEDURE — 36415 COLL VENOUS BLD VENIPUNCTURE: CPT | Performed by: FAMILY MEDICINE

## 2022-09-06 PROCEDURE — T1013 SIGN LANG/ORAL INTERPRETER: HCPCS | Mod: U3

## 2022-09-06 PROCEDURE — 83880 ASSAY OF NATRIURETIC PEPTIDE: CPT | Performed by: FAMILY MEDICINE

## 2022-09-06 PROCEDURE — 80061 LIPID PANEL: CPT | Performed by: FAMILY MEDICINE

## 2022-09-06 RX ORDER — TORSEMIDE 5 MG/1
TABLET ORAL
Qty: 270 TABLET | Refills: 1 | Status: SHIPPED | OUTPATIENT
Start: 2022-09-06 | End: 2023-03-16

## 2022-09-06 ASSESSMENT — ASTHMA QUESTIONNAIRES
ACT_TOTALSCORE: 14
ACT_TOTALSCORE: 14
QUESTION_3 LAST FOUR WEEKS HOW OFTEN DID YOUR ASTHMA SYMPTOMS (WHEEZING, COUGHING, SHORTNESS OF BREATH, CHEST TIGHTNESS OR PAIN) WAKE YOU UP AT NIGHT OR EARLIER THAN USUAL IN THE MORNING: ONCE OR TWICE
QUESTION_5 LAST FOUR WEEKS HOW WOULD YOU RATE YOUR ASTHMA CONTROL: SOMEWHAT CONTROLLED
QUESTION_4 LAST FOUR WEEKS HOW OFTEN HAVE YOU USED YOUR RESCUE INHALER OR NEBULIZER MEDICATION (SUCH AS ALBUTEROL): THREE OR MORE TIMES PER DAY
QUESTION_2 LAST FOUR WEEKS HOW OFTEN HAVE YOU HAD SHORTNESS OF BREATH: MORE THAN ONCE A DAY
QUESTION_1 LAST FOUR WEEKS HOW MUCH OF THE TIME DID YOUR ASTHMA KEEP YOU FROM GETTING AS MUCH DONE AT WORK, SCHOOL OR AT HOME: NONE OF THE TIME

## 2022-09-06 NOTE — LETTER
September 8, 2022      Shiraz PALOMARES Juan Jose  [address]  [facility] [id number]        Dear ,      We are writing to inform you of your test results. Cholesterol is stable.  Kidney function is abnormal but stable.  Urine test for leaking protein shows some leaking of protein which improves with better control of blood pressure.  Heart failure test is mildly worse than the last time and as we discussed increasing water pill can help.  If continue to have leg swelling or short of breath please follow-up with heart doctor.     Resulted Orders   Lipid panel reflex to direct LDL Non-fasting   Result Value Ref Range    Cholesterol 103 <200 mg/dL    Triglycerides 54 <150 mg/dL    Direct Measure HDL 44 >=40 mg/dL    LDL Cholesterol Calculated 48 <=100 mg/dL    Non HDL Cholesterol 59 <130 mg/dL    Patient Fasting > 8hrs? Yes     Narrative    Cholesterol  Desirable:  <200 mg/dL    Triglycerides  Normal:  Less than 150 mg/dL  Borderline High:  150-199 mg/dL  High:  200-499 mg/dL  Very High:  Greater than or equal to 500 mg/dL    Direct Measure HDL  Female:  Greater than or equal to 50 mg/dL   Male:  Greater than or equal to 40 mg/dL    LDL Cholesterol  Desirable:  <100mg/dL  Above Desirable:  100-129 mg/dL   Borderline High:  130-159 mg/dL   High:  160-189 mg/dL   Very High:  >= 190 mg/dL    Non HDL Cholesterol  Desirable:  130 mg/dL  Above Desirable:  130-159 mg/dL  Borderline High:  160-189 mg/dL  High:  190-219 mg/dL  Very High:  Greater than or equal to 220 mg/dL   Albumin Random Urine Quantitative with Creat Ratio   Result Value Ref Range    Creatinine Urine mg/dL 75 mg/dL    Albumin Urine mg/L 137 mg/L    Albumin Urine mg/g Cr 182.67 (H) 0.00 - 17.00 mg/g Cr   Basic metabolic panel  (Ca, Cl, CO2, Creat, Gluc, K, Na, BUN)   Result Value Ref Range    Sodium 140 133 - 144 mmol/L    Potassium 4.5 3.4 - 5.3 mmol/L    Chloride 108 94 - 109 mmol/L    Carbon Dioxide (CO2) 26 20 - 32 mmol/L    Anion Gap 6 3 - 14 mmol/L     Urea Nitrogen 51 (H) 7 - 30 mg/dL    Creatinine 1.66 (H) 0.66 - 1.25 mg/dL    Calcium 9.2 8.5 - 10.1 mg/dL    Glucose 109 (H) 70 - 99 mg/dL    GFR Estimate 40 (L) >60 mL/min/1.73m2      Comment:      Effective December 21, 2021 eGFRcr in adults is calculated using the 2021 CKD-EPI creatinine equation which includes age and gender (Shahnaz et al., NEJ, DOI: 10.1056/VGGOzd0953348)   BNP-N terminal pro   Result Value Ref Range    N Terminal Pro BNP Outpatient 4,140 (H) 0 - 450 pg/mL      Comment:      Reference range shown and results flagged as abnormal are for the outpatient, non acute settings. Establishing a baseline value for each individual patient is useful for follow-up.    Suggested inpatient cut points for confirming diagnosis of CHF in an acute setting are:  >450 pg/mL (age 18 to less than 50)  >900 pg/mL (age 50 to less than 75)  >1800 pg/mL (75 yrs and older)    An inpatient or emergency department NT-proPBNP <300 pg/mL effectively rules out acute CHF, with 99% negative predictive value.         If you have any questions or concerns, please call the clinic at the number listed above.       Sincerely,    Dany Rodriguez MD/

## 2022-09-06 NOTE — PROGRESS NOTES
Assessment & Plan     Acute diastolic heart failure (H)   Mild worsening of leg swelling and weight gain we agreed to take consistent 50 mg torsemide instead of 10 mg every day and 5mg prn. We will do BNP and renal function today.  If leg swellings or weight gain are persistent we may need to do further evaluation.  - torsemide (DEMADEX) 5 MG tablet; Patient taking 10 mg in the morning. Take additional 5mg if weight gain of 2-3 lbs in 1 day.  - BNP-N terminal pro; Future  - BNP-N terminal pro    S/P TAVR (transcatheter aortic valve replacement)  See above.   - torsemide (DEMADEX) 5 MG tablet; Patient taking 10 mg in the morning. Take additional 5mg if weight gain of 2-3 lbs in 1 day.    Hyperlipidemia with target LDL less than 100  Patient is tolerating current medication without any major side effects of concerns and current dose seems reasonable too.  Current medication regime is effective. Continue current treatment without any changes.   - Lipid panel reflex to direct LDL Non-fasting; Future  - torsemide (DEMADEX) 5 MG tablet; Patient taking 10 mg in the morning. Take additional 5mg if weight gain of 2-3 lbs in 1 day.  - Lipid panel reflex to direct LDL Non-fasting    Stage 3b chronic kidney disease (H)  Monitor renal function due to above.   - Albumin Random Urine Quantitative with Creat Ratio; Future  - Basic metabolic panel  (Ca, Cl, CO2, Creat, Gluc, K, Na, BUN); Future  - BNP-N terminal pro; Future  - Albumin Random Urine Quantitative with Creat Ratio  - Basic metabolic panel  (Ca, Cl, CO2, Creat, Gluc, K, Na, BUN)  - BNP-N terminal pro     hypertension goal BP (blood pressure) <140/90  Usually home readings are in much better shape.  We agreed not to adjust his medication as he is coming in for physical. Will adjust rx if needed at that time.       Answered his questions via .      Dany Rodriguez MD, MD  [facility]    Javon Weldon is a  "87 year old, presenting for the following health issues:  weight gain and Leg Swelling      History of Present Illness       Reason for visit:  Weight gain, swollen legs and SOB sometimes    He eats 0-1 servings of fruits and vegetables daily.He consumes 1 sweetened beverage(s) daily.He exercises with enough effort to increase his heart rate 30 to 60 minutes per day.  He exercises with enough effort to increase his heart rate 7 days per week.   He is taking medications regularly.     Gained weight, leg swelling.   Feels somewhat short of breath.     Home bp this morning 122/93.     Taking torsemide 10mg per day. Needing refills.     Taking 10mg qam and 5mg in the evening. Sometime skips doses if not swelling but it is rare.     Walking daily. No chest pain or pressure with walking. Some shortness of breath which is minimally worse than baseline.        Review of Systems         Objective    BP (!) 160/72   Pulse 54   Temp 97.5  F (36.4  C) (Temporal)   Resp 14   Ht 1.727 m (5' 8\")   Wt 73 kg (161 lb)   SpO2 92%   BMI 24.48 kg/m    Body mass index is 24.48 kg/m .  Physical Exam   Both lower extremities pitting edema up to knee-high.  Using sign language.                    @MARIA [email or web address]@  @[facility] [email or web address]@  "

## 2022-10-08 ENCOUNTER — APPOINTMENT (OUTPATIENT)
Dept: GENERAL RADIOLOGY | Facility: CLINIC | Age: 87
DRG: 193 | End: 2022-10-08
Attending: EMERGENCY MEDICINE
Payer: COMMERCIAL

## 2022-10-08 ENCOUNTER — APPOINTMENT (OUTPATIENT)
Dept: CT IMAGING | Facility: CLINIC | Age: 87
DRG: 193 | End: 2022-10-08
Attending: EMERGENCY MEDICINE
Payer: COMMERCIAL

## 2022-10-08 ENCOUNTER — HOSPITAL ENCOUNTER (INPATIENT)
Facility: CLINIC | Age: 87
LOS: 2 days | Discharge: HOME | DRG: 193 | End: 2022-10-10
Attending: EMERGENCY MEDICINE | Admitting: INTERNAL MEDICINE
Payer: COMMERCIAL

## 2022-10-08 DIAGNOSIS — J18.9 PNEUMONIA DUE TO INFECTIOUS ORGANISM, UNSPECIFIED LATERALITY, UNSPECIFIED PART OF LUNG: Primary | ICD-10-CM

## 2022-10-08 LAB
ALBUMIN SERPL-MCNC: 3.8 G/DL (ref 3.4–5)
ALP SERPL-CCNC: 86 U/L (ref 40–150)
ALT SERPL W P-5'-P-CCNC: 38 U/L (ref 0–70)
ANION GAP SERPL CALCULATED.3IONS-SCNC: 9 MMOL/L (ref 3–14)
AST SERPL W P-5'-P-CCNC: 31 U/L (ref 0–45)
ATRIAL RATE - MUSE: 72 BPM
BACTERIA SPT CULT: NORMAL
BASOPHILS # BLD AUTO: 0 10E3/UL (ref 0–0.2)
BASOPHILS NFR BLD AUTO: 0 %
BILIRUB SERPL-MCNC: 0.7 MG/DL (ref 0.2–1.3)
BUN SERPL-MCNC: 51 MG/DL (ref 7–30)
CALCIUM SERPL-MCNC: 9.1 MG/DL (ref 8.5–10.1)
CHLORIDE BLD-SCNC: 105 MMOL/L (ref 94–109)
CO2 SERPL-SCNC: 27 MMOL/L (ref 20–32)
CREAT SERPL-MCNC: 1.79 MG/DL (ref 0.66–1.25)
DIASTOLIC BLOOD PRESSURE - MUSE: NORMAL MMHG
EOSINOPHIL # BLD AUTO: 0.1 10E3/UL (ref 0–0.7)
EOSINOPHIL NFR BLD AUTO: 2 %
ERYTHROCYTE [DISTWIDTH] IN BLOOD BY AUTOMATED COUNT: 18.2 % (ref 10–15)
FLUAV RNA SPEC QL NAA+PROBE: NEGATIVE
FLUBV RNA RESP QL NAA+PROBE: NEGATIVE
GFR SERPL CREATININE-BSD FRML MDRD: 36 ML/MIN/1.73M2
GLUCOSE BLD-MCNC: 122 MG/DL (ref 70–99)
GRAM STAIN RESULT: NORMAL
HCO3 BLDV-SCNC: 30 MMOL/L (ref 21–28)
HCT VFR BLD AUTO: 37.6 % (ref 40–53)
HGB BLD-MCNC: 11.2 G/DL (ref 13.3–17.7)
HOLD SPECIMEN: NORMAL
IMM GRANULOCYTES # BLD: 0 10E3/UL
IMM GRANULOCYTES NFR BLD: 0 %
INTERPRETATION ECG - MUSE: NORMAL
L PNEUMO1 AG UR QL IA: NEGATIVE
LACTATE BLD-SCNC: 0.9 MMOL/L
LYMPHOCYTES # BLD AUTO: 0.3 10E3/UL (ref 0.8–5.3)
LYMPHOCYTES NFR BLD AUTO: 5 %
MCH RBC QN AUTO: 20.4 PG (ref 26.5–33)
MCHC RBC AUTO-ENTMCNC: 29.8 G/DL (ref 31.5–36.5)
MCV RBC AUTO: 68 FL (ref 78–100)
MONOCYTES # BLD AUTO: 0.4 10E3/UL (ref 0–1.3)
MONOCYTES NFR BLD AUTO: 6 %
NEUTROPHILS # BLD AUTO: 4.9 10E3/UL (ref 1.6–8.3)
NEUTROPHILS NFR BLD AUTO: 87 %
NRBC # BLD AUTO: 0 10E3/UL
NRBC BLD AUTO-RTO: 0 /100
P AXIS - MUSE: NORMAL DEGREES
PCO2 BLDV: 63 MM HG (ref 40–50)
PH BLDV: 7.28 [PH] (ref 7.32–7.43)
PLATELET # BLD AUTO: 115 10E3/UL (ref 150–450)
PO2 BLDV: 26 MM HG (ref 25–47)
POTASSIUM BLD-SCNC: 4.3 MMOL/L (ref 3.4–5.3)
PR INTERVAL - MUSE: NORMAL MS
PROT SERPL-MCNC: 7.8 G/DL (ref 6.8–8.8)
QRS DURATION - MUSE: 132 MS
QT - MUSE: 438 MS
QTC - MUSE: 473 MS
R AXIS - MUSE: 255 DEGREES
RBC # BLD AUTO: 5.5 10E6/UL (ref 4.4–5.9)
RSV RNA SPEC NAA+PROBE: NEGATIVE
S PNEUM AG SPEC QL: NEGATIVE
SAO2 % BLDV: 38 % (ref 94–100)
SARS-COV-2 RNA RESP QL NAA+PROBE: NEGATIVE
SODIUM SERPL-SCNC: 141 MMOL/L (ref 133–144)
SYSTOLIC BLOOD PRESSURE - MUSE: NORMAL MMHG
T AXIS - MUSE: 46 DEGREES
TROPONIN I SERPL HS-MCNC: 23 NG/L
VENTRICULAR RATE- MUSE: 70 BPM
WBC # BLD AUTO: 5.7 10E3/UL (ref 4–11)

## 2022-10-08 PROCEDURE — 250N000011 HC RX IP 250 OP 636: Performed by: INTERNAL MEDICINE

## 2022-10-08 PROCEDURE — 93005 ELECTROCARDIOGRAM TRACING: CPT

## 2022-10-08 PROCEDURE — 94640 AIRWAY INHALATION TREATMENT: CPT | Mod: 76

## 2022-10-08 PROCEDURE — 87070 CULTURE OTHR SPECIMN AEROBIC: CPT | Performed by: HOSPITALIST

## 2022-10-08 PROCEDURE — 96365 THER/PROPH/DIAG IV INF INIT: CPT

## 2022-10-08 PROCEDURE — 84484 ASSAY OF TROPONIN QUANT: CPT | Performed by: EMERGENCY MEDICINE

## 2022-10-08 PROCEDURE — 87899 AGENT NOS ASSAY W/OPTIC: CPT | Performed by: INTERNAL MEDICINE

## 2022-10-08 PROCEDURE — 87637 SARSCOV2&INF A&B&RSV AMP PRB: CPT | Performed by: EMERGENCY MEDICINE

## 2022-10-08 PROCEDURE — 99223 1ST HOSP IP/OBS HIGH 75: CPT | Mod: AI | Performed by: INTERNAL MEDICINE

## 2022-10-08 PROCEDURE — 87070 CULTURE OTHR SPECIMN AEROBIC: CPT | Performed by: INTERNAL MEDICINE

## 2022-10-08 PROCEDURE — 250N000009 HC RX 250: Performed by: INTERNAL MEDICINE

## 2022-10-08 PROCEDURE — C9803 HOPD COVID-19 SPEC COLLECT: HCPCS

## 2022-10-08 PROCEDURE — 99285 EMERGENCY DEPT VISIT HI MDM: CPT | Mod: 25

## 2022-10-08 PROCEDURE — 85014 HEMATOCRIT: CPT | Performed by: EMERGENCY MEDICINE

## 2022-10-08 PROCEDURE — 250N000013 HC RX MED GY IP 250 OP 250 PS 637: Performed by: INTERNAL MEDICINE

## 2022-10-08 PROCEDURE — 36415 COLL VENOUS BLD VENIPUNCTURE: CPT | Performed by: EMERGENCY MEDICINE

## 2022-10-08 PROCEDURE — 80053 COMPREHEN METABOLIC PANEL: CPT | Performed by: EMERGENCY MEDICINE

## 2022-10-08 PROCEDURE — 82803 BLOOD GASES ANY COMBINATION: CPT

## 2022-10-08 PROCEDURE — 250N000011 HC RX IP 250 OP 636: Performed by: EMERGENCY MEDICINE

## 2022-10-08 PROCEDURE — 258N000003 HC RX IP 258 OP 636: Performed by: INTERNAL MEDICINE

## 2022-10-08 PROCEDURE — 71250 CT THORAX DX C-: CPT

## 2022-10-08 PROCEDURE — 86738 MYCOPLASMA ANTIBODY: CPT | Performed by: INTERNAL MEDICINE

## 2022-10-08 PROCEDURE — 87040 BLOOD CULTURE FOR BACTERIA: CPT | Performed by: EMERGENCY MEDICINE

## 2022-10-08 PROCEDURE — 120N000001 HC R&B MED SURG/OB

## 2022-10-08 PROCEDURE — 999N000157 HC STATISTIC RCP TIME EA 10 MIN

## 2022-10-08 PROCEDURE — 96366 THER/PROPH/DIAG IV INF ADDON: CPT

## 2022-10-08 PROCEDURE — 71046 X-RAY EXAM CHEST 2 VIEWS: CPT

## 2022-10-08 PROCEDURE — 94640 AIRWAY INHALATION TREATMENT: CPT

## 2022-10-08 PROCEDURE — 82040 ASSAY OF SERUM ALBUMIN: CPT | Performed by: EMERGENCY MEDICINE

## 2022-10-08 RX ORDER — MONTELUKAST SODIUM 10 MG/1
10 TABLET ORAL DAILY
Status: DISCONTINUED | OUTPATIENT
Start: 2022-10-08 | End: 2022-10-10 | Stop reason: HOSPADM

## 2022-10-08 RX ORDER — METHYLPREDNISOLONE SODIUM SUCCINATE 40 MG/ML
40 INJECTION, POWDER, LYOPHILIZED, FOR SOLUTION INTRAMUSCULAR; INTRAVENOUS EVERY 24 HOURS
Status: DISCONTINUED | OUTPATIENT
Start: 2022-10-08 | End: 2022-10-10

## 2022-10-08 RX ORDER — AZITHROMYCIN 500 MG/1
500 INJECTION, POWDER, LYOPHILIZED, FOR SOLUTION INTRAVENOUS ONCE
Status: COMPLETED | OUTPATIENT
Start: 2022-10-08 | End: 2022-10-08

## 2022-10-08 RX ORDER — PROCHLORPERAZINE MALEATE 5 MG
5 TABLET ORAL EVERY 6 HOURS PRN
Status: DISCONTINUED | OUTPATIENT
Start: 2022-10-08 | End: 2022-10-10 | Stop reason: HOSPADM

## 2022-10-08 RX ORDER — SODIUM CHLORIDE 9 MG/ML
INJECTION, SOLUTION INTRAVENOUS CONTINUOUS
Status: ACTIVE | OUTPATIENT
Start: 2022-10-08 | End: 2022-10-08

## 2022-10-08 RX ORDER — ONDANSETRON 4 MG/1
4 TABLET, ORALLY DISINTEGRATING ORAL EVERY 6 HOURS PRN
Status: DISCONTINUED | OUTPATIENT
Start: 2022-10-08 | End: 2022-10-10 | Stop reason: HOSPADM

## 2022-10-08 RX ORDER — NITROGLYCERIN 0.4 MG/1
0.4 TABLET SUBLINGUAL EVERY 5 MIN PRN
Status: DISCONTINUED | OUTPATIENT
Start: 2022-10-08 | End: 2022-10-10 | Stop reason: HOSPADM

## 2022-10-08 RX ORDER — PROCHLORPERAZINE 25 MG
12.5 SUPPOSITORY, RECTAL RECTAL EVERY 12 HOURS PRN
Status: DISCONTINUED | OUTPATIENT
Start: 2022-10-08 | End: 2022-10-10 | Stop reason: HOSPADM

## 2022-10-08 RX ORDER — LIDOCAINE 40 MG/G
CREAM TOPICAL
Status: DISCONTINUED | OUTPATIENT
Start: 2022-10-08 | End: 2022-10-10 | Stop reason: HOSPADM

## 2022-10-08 RX ORDER — METOPROLOL TARTRATE 25 MG/1
25 TABLET, FILM COATED ORAL 2 TIMES DAILY
Status: DISCONTINUED | OUTPATIENT
Start: 2022-10-08 | End: 2022-10-10 | Stop reason: HOSPADM

## 2022-10-08 RX ORDER — FERROUS SULFATE 325(65) MG
325 TABLET, DELAYED RELEASE (ENTERIC COATED) ORAL DAILY
Status: ON HOLD | COMMUNITY
End: 2022-10-10

## 2022-10-08 RX ORDER — ASPIRIN 81 MG/1
81 TABLET ORAL DAILY
Status: DISCONTINUED | OUTPATIENT
Start: 2022-10-09 | End: 2022-10-10 | Stop reason: HOSPADM

## 2022-10-08 RX ORDER — SIMVASTATIN 40 MG
40 TABLET ORAL AT BEDTIME
Status: DISCONTINUED | OUTPATIENT
Start: 2022-10-08 | End: 2022-10-10 | Stop reason: HOSPADM

## 2022-10-08 RX ORDER — PANTOPRAZOLE SODIUM 40 MG/1
40 TABLET, DELAYED RELEASE ORAL
Status: DISCONTINUED | OUTPATIENT
Start: 2022-10-09 | End: 2022-10-10 | Stop reason: HOSPADM

## 2022-10-08 RX ORDER — FERROUS SULFATE 325(65) MG
325 TABLET ORAL DAILY
Status: DISCONTINUED | OUTPATIENT
Start: 2022-10-08 | End: 2022-10-09

## 2022-10-08 RX ORDER — TORSEMIDE 10 MG/1
10 TABLET ORAL DAILY
Status: DISCONTINUED | OUTPATIENT
Start: 2022-10-09 | End: 2022-10-10 | Stop reason: HOSPADM

## 2022-10-08 RX ORDER — GUAIFENESIN/DEXTROMETHORPHAN 100-10MG/5
10 SYRUP ORAL EVERY 4 HOURS PRN
Status: DISCONTINUED | OUTPATIENT
Start: 2022-10-08 | End: 2022-10-10 | Stop reason: HOSPADM

## 2022-10-08 RX ORDER — ISOSORBIDE MONONITRATE 30 MG/1
30 TABLET, EXTENDED RELEASE ORAL DAILY
Status: DISCONTINUED | OUTPATIENT
Start: 2022-10-09 | End: 2022-10-10 | Stop reason: HOSPADM

## 2022-10-08 RX ORDER — CEFTRIAXONE 2 G/1
2 INJECTION, POWDER, FOR SOLUTION INTRAMUSCULAR; INTRAVENOUS EVERY 24 HOURS
Status: DISCONTINUED | OUTPATIENT
Start: 2022-10-09 | End: 2022-10-10

## 2022-10-08 RX ORDER — DABIGATRAN ETEXILATE 150 MG/1
150 CAPSULE ORAL 2 TIMES DAILY
Status: DISCONTINUED | OUTPATIENT
Start: 2022-10-09 | End: 2022-10-10 | Stop reason: HOSPADM

## 2022-10-08 RX ORDER — DOXYCYCLINE 100 MG/10ML
100 INJECTION, POWDER, LYOPHILIZED, FOR SOLUTION INTRAVENOUS EVERY 12 HOURS
Status: DISCONTINUED | OUTPATIENT
Start: 2022-10-08 | End: 2022-10-10

## 2022-10-08 RX ORDER — CEFTRIAXONE 2 G/1
2 INJECTION, POWDER, FOR SOLUTION INTRAMUSCULAR; INTRAVENOUS ONCE
Status: COMPLETED | OUTPATIENT
Start: 2022-10-08 | End: 2022-10-08

## 2022-10-08 RX ORDER — ONDANSETRON 2 MG/ML
4 INJECTION INTRAMUSCULAR; INTRAVENOUS EVERY 6 HOURS PRN
Status: DISCONTINUED | OUTPATIENT
Start: 2022-10-08 | End: 2022-10-10 | Stop reason: HOSPADM

## 2022-10-08 RX ORDER — ACETAMINOPHEN 500 MG
1000 TABLET ORAL EVERY 8 HOURS PRN
Status: DISCONTINUED | OUTPATIENT
Start: 2022-10-08 | End: 2022-10-10 | Stop reason: HOSPADM

## 2022-10-08 RX ORDER — IPRATROPIUM BROMIDE AND ALBUTEROL SULFATE 2.5; .5 MG/3ML; MG/3ML
3 SOLUTION RESPIRATORY (INHALATION)
Status: DISCONTINUED | OUTPATIENT
Start: 2022-10-08 | End: 2022-10-10 | Stop reason: HOSPADM

## 2022-10-08 RX ORDER — BUDESONIDE 0.5 MG/2ML
0.5 INHALANT ORAL 2 TIMES DAILY
Status: DISCONTINUED | OUTPATIENT
Start: 2022-10-08 | End: 2022-10-10 | Stop reason: HOSPADM

## 2022-10-08 RX ADMIN — DOXYCYCLINE 100 MG: 100 INJECTION, POWDER, LYOPHILIZED, FOR SOLUTION INTRAVENOUS at 13:51

## 2022-10-08 RX ADMIN — HYDRALAZINE HYDROCHLORIDE 12.5 MG: 25 TABLET ORAL at 21:03

## 2022-10-08 RX ADMIN — AZITHROMYCIN DIHYDRATE 500 MG: 500 INJECTION, POWDER, LYOPHILIZED, FOR SOLUTION INTRAVENOUS at 10:46

## 2022-10-08 RX ADMIN — BUDESONIDE 0.5 MG: 0.5 INHALANT RESPIRATORY (INHALATION) at 21:14

## 2022-10-08 RX ADMIN — HYDRALAZINE HYDROCHLORIDE 12.5 MG: 25 TABLET ORAL at 14:27

## 2022-10-08 RX ADMIN — METOPROLOL TARTRATE 25 MG: 25 TABLET, FILM COATED ORAL at 21:00

## 2022-10-08 RX ADMIN — SODIUM CHLORIDE, PRESERVATIVE FREE: 5 INJECTION INTRAVENOUS at 13:34

## 2022-10-08 RX ADMIN — MONTELUKAST 10 MG: 10 TABLET, FILM COATED ORAL at 21:03

## 2022-10-08 RX ADMIN — FERROUS SULFATE TAB 325 MG (65 MG ELEMENTAL FE) 325 MG: 325 (65 FE) TAB at 13:33

## 2022-10-08 RX ADMIN — CEFTRIAXONE SODIUM 2 G: 2 INJECTION, POWDER, FOR SOLUTION INTRAMUSCULAR; INTRAVENOUS at 10:47

## 2022-10-08 RX ADMIN — IPRATROPIUM BROMIDE AND ALBUTEROL SULFATE 3 ML: .5; 3 SOLUTION RESPIRATORY (INHALATION) at 21:08

## 2022-10-08 RX ADMIN — DOXYCYCLINE 100 MG: 100 INJECTION, POWDER, LYOPHILIZED, FOR SOLUTION INTRAVENOUS at 23:17

## 2022-10-08 RX ADMIN — METHYLPREDNISOLONE SODIUM SUCCINATE 40 MG: 40 INJECTION, POWDER, FOR SOLUTION INTRAMUSCULAR; INTRAVENOUS at 13:33

## 2022-10-08 RX ADMIN — SIMVASTATIN 40 MG: 40 TABLET, FILM COATED ORAL at 22:29

## 2022-10-08 RX ADMIN — IPRATROPIUM BROMIDE AND ALBUTEROL SULFATE 3 ML: .5; 3 SOLUTION RESPIRATORY (INHALATION) at 14:40

## 2022-10-08 ASSESSMENT — ENCOUNTER SYMPTOMS
FEVER: 0
SHORTNESS OF BREATH: 1
LIGHT-HEADEDNESS: 0
BLOOD IN STOOL: 0
COUGH: 1
RHINORRHEA: 1
PALPITATIONS: 0
FLANK PAIN: 1
SORE THROAT: 0

## 2022-10-08 ASSESSMENT — ACTIVITIES OF DAILY LIVING (ADL)
ADLS_ACUITY_SCORE: 39

## 2022-10-08 NOTE — H&P
Admitted: 10/08/2022    HISTORY OF PRESENT ILLNESS:  This is an 87-year-old male with history of atrial fibrillation, severe aortic stenosis, status post TAVR, post TAVR complete heart block, status post pacemaker placement, congestive heart failure, chronic kidney disease stage III, coronary artery disease, status post CABG, hypertension, hyperlipidemia, asthma and deaf.  History is obtained from the patient using the sign .  The patient presented to the ER with complaint of cough, hemoptysis and right-sided chest pain.    According to the patient, he was fine for the last few days. He has been having some coughing and today, started having blood in his phlegm.  He also noticed right-sided chest pain and some occasional shortness of breath, so he came to the ER.  The patient denies any fever, chills, headache, dizziness, lightheadedness.  No abdominal pain, back pain, dysuria, hematuria, constipation, or diarrhea.  He has no recent travel.  No sick contact.  The patient has been vaccinated for COVID.  Otherwise, the rest of the review of systems is negative.    On workup in the ED, found to have right upper lobe consolidation, and subsequently, the hospitalist service was consulted to admit the patient for pneumonia and hemoptysis.    ASSESSMENT AND PLAN:    1.  Acute hypoxic respiratory failure/community-acquired pneumonia/Hemoptysis:  This is an 87-year-old male with multiple comorbidities.  He presented with cough, right-sided chest pain, and hemoptysis.  Chest x-ray shows right upper lobe consolidation.  CT scan of the chest done in the ED did show patchy and ground-glass opacities seen on the right upper lobe with similar mild opacity in the right lower lobe and right middle lobe, likely secondary to pneumonia.  At this time, I will obtain the sputum for Gram stain and culture.  Blood cultures already obtained.  I will send blood for mycoplasma antibodies, IgM and IgG, urine for pneumococcal and  legionella antigen.  Start him on IV ceftriaxone and doxycycline.  Hold azithromycin as interaction with Pradaxa.  His hemoptysis is likely because of the pneumonia and being on Pradaxa.  We will keep an eye on it.  If it worsens, then we may need to hold the Pradaxa.  I will give him a day off Pradaxa today and restart Pradaxa from tomorrow.  He was hypoxic to 86% on room air.  It was because of pneumonia.  He is on supplemental oxygen 6 liters and saturating 98% with that.  Will start him on aggressive pulmonary toilet with incentive spirometry flutter.  We will keep him on nebulization with DuoNeb and Pulmicort as he used at home.  I will put him on IV Solu-Medrol 40 mg daily as well.  2.  Atrial fibrillation:  Has been rate controlled. History of complete heart block post-TAVR.  He is status post pacemaker placement.  No complete heart block at this time and is reasonably controlled at this point.  Continue with the metoprolol.  3.  History of hypertension: On hydralazine on metoprolol.  I will continue with that.  4.  History of asthma: Not in acute exacerbation at this point.  He is on Singulair, Pulmicort nebulization, DuoNeb nebulization and will continue with that.    5.  Aortic stenosis, status post TAVR:  Has been stable at this time.  6.  Gastroesophageal reflux disease: On Protonix.  We will continue with that.  7.  Chronic kidney disease stage III: Baseline creatinine anywhere from 1.6 to 1.8.  His creatinine is baseline level at this time.  8.  Lower extremity edema:  This is multifactorial secondary to chronic kidney disease, CHF.  He is on torsemide 10 mg daily.  I will continue with that.  9.  Coronary artery disease:  Has been stable.  Troponin negative.  EKG not ischemic. No central chest pain at this point.  Continue his medications at home, Imdur, metoprolol, Zocor at this time.    10.  GI prophylaxis with Protonix.  11.  DVT prophylaxis with Pradaxa.    CODE STATUS:  I had a detailed  discussion with the patient regarding his code status and he wanted to be FULL CODE at this time.  Previously, he has been DNR/DNI during his last admission, but before that, was full code, so AT THIS POINT, HE WANTED TO BE FULL CODE.    The case was discussed with ER physician and the nursing staff taking care of the patient.    Omar Figueredo MD        D: 10/08/2022   T: 10/08/2022   MT: [id number]    Name:     SRIKANTH OBANDO  MRN:      [id number]        Account:     [id number]   :      1934           Admitted:    10/08/2022       Document: [id number]

## 2022-10-08 NOTE — PHARMACY-ADMISSION MEDICATION HISTORY
Pharmacy Medication History  Admission medication history interview status for the 10/8/2022  admission is complete. See EPIC admission navigator for prior to admission medications     Location of Interview: Patient room with face mask.   Medication history sources: Patient, Surescripts and ASL interpretor at [facility].     Significant changes made to the medication list:  Added: none  Changed: acetaminophen (frequency), ferrous sulfate (frequency), MVI (frequency)  Removed: none    In the past week, patient estimated taking medication this percent of the time: greater than 90%    Additional medication history information:   - states that he takes multivitamin twice per day.  - torsemide - prescription states to take 10 mg in the morning and an additional 5 mg is weight increases 2 - 3 pounds.  He states that he takes 10 mg in the morning and 5 mg in the evening.     Medication reconciliation completed by provider prior to medication history? No    Time spent in this activity: 30 minutes    Prior to Admission medications    Medication Sig Last Dose Taking? Auth Provider Long Term End Date   acetaminophen (TYLENOL) 500 MG tablet Take 1,000 mg by mouth every 8 hours as needed Past Week at Unknown time Yes Reported, Patient     albuterol (PROAIR HFA/PROVENTIL HFA/VENTOLIN HFA) 108 (90 Base) MCG/ACT inhaler INHALE 1 TO 2 PUFFS BY MOUTH EVERY 4 TO 6 HOURS AS NEEDED Past Week at Unknown time Yes Ty Wing, DO Yes    aspirin (ASA) 81 MG EC tablet Take 81 mg by mouth daily 10/8/2022 at morning Yes Reported, Patient No    budesonide (PULMICORT) 0.5 MG/2ML nebulizer solution Take 2 mLs (0.5 mg) by nebulization 2 times daily 10/8/2022 at morning Yes Dany Rodriguez MD Yes    calcium carbonate 600 mg-vitamin D 400 units (CALTRATE) 600-400 MG-UNIT per tablet Take 1 tablet by mouth daily 10/8/2022 at morning Yes Unknown, Entered By History     dabigatran ANTICOAGULANT (PRADAXA) 150 MG capsule Take 1 capsule (150 mg) by  mouth 2 times daily 10/8/2022 at morning Yes Jaxon Field MD     ferrous sulfate (FE TABS) 325 (65 Fe) MG EC tablet Take 325 mg by mouth daily 10/8/2022 at morning Yes Unknown, Entered By History     hydrALAZINE (APRESOLINE) 25 MG tablet Take 0.5 tablets (12.5 mg) by mouth 3 times daily 10/8/2022 at morning Yes Jaxon Field MD Yes    ipratropium - albuterol 0.5 mg/2.5 mg/3 mL (DUONEB) 0.5-2.5 (3) MG/3ML nebulization Inhale 1 vial (3 mLs) into the lungs 4 times daily 10/8/2022 at Unknown time Yes Dany Rodriguez MD Yes    isosorbide mononitrate (IMDUR) 30 MG 24 hr tablet Take 1 tablet (30 mg) by mouth daily 10/8/2022 at Unknown time Yes Jaxon Field MD Yes    magnesium chloride (MAG64) 535 (64 Mg) MG TBEC CR tablet Take 1 tablet (535 mg) by mouth 2 times daily 10/8/2022 at Unknown time Yes Gustavo Tierney MD     melatonin 3 MG tablet Take 1 tablet (3 mg) by mouth nightly as needed for sleep Past Week at Unknown time Yes Nnamdi Sears MD     metoprolol tartrate (LOPRESSOR) 25 MG tablet TAKE 1 TABLET BY MOUTH TWICE A DAY 10/8/2022 at Unknown time Yes Dany Rodriguez MD Yes    montelukast (SINGULAIR) 10 MG tablet Take 1 tablet (10 mg) by mouth daily 10/7/2022 at Unknown time Yes Dany Rodriguez MD Yes    multivitamin, therapeutic (THERA-VIT) TABS tablet Take 1 tablet by mouth 2 times daily 10/8/2022 at Unknown time Yes Unknown, Entered By History     nitroGLYcerin (NITROSTAT) 0.4 MG sublingual tablet For chest pain place 1 tablet under the tongue every 5 minutes for 3 doses. If symptoms persist 5 minutes after 1st dose call 911. More than a month at Unknown time Yes Bozena Groves MD Yes    pantoprazole (PROTONIX) 40 MG EC tablet TAKE 1 TABLET BEFORE MEALS TWICE A DAY 10/8/2022 at Unknown time Yes Dany Rodriguez MD     simvastatin (ZOCOR) 40 MG tablet Take 1 tablet (40 mg) by mouth At Bedtime 10/7/2022 at Unknown time Yes Amee Rocha PA-C  Yes    torsemide (DEMADEX) 5 MG tablet Patient taking 10 mg in the morning. Take additional 5mg if weight gain of 2-3 lbs in 1 day. 10/8/2022 at Unknown time Yes Dany Rodriguez MD Yes          The information provided in this note is only as accurate as the sources available at the time of update(s)

## 2022-10-08 NOTE — ED TRIAGE NOTES
Coughing, sob for the past 3 days, this am had hemoptisis, wears O2 at times at home yet hasnt lately, sats 86 on RA     Triage Assessment     Row Name 10/08/22 0726       Triage Assessment (Adult)    Airway WDL WDL       Respiratory WDL    Respiratory WDL cough;rhythm/pattern    Rhythm/Pattern, Respiratory shortness of breath       Cardiac WDL    Cardiac WDL WDL       Cognitive/Neuro/Behavioral WDL    Cognitive/Neuro/Behavioral WDL WDL

## 2022-10-08 NOTE — PROGRESS NOTES
RECEIVING UNIT ED HANDOFF REVIEW    ED Nurse Handoff Report was reviewed by: Rere Polanco RN on October 8, 2022 at 12:09 PM        Bcc Infiltrative Histology Text: There were numerous aggregates of basaloid cells demonstrating an infiltrative pattern.

## 2022-10-08 NOTE — ED NOTES
.  [facility]  ED Nurse Handoff Report    ED Chief complaint: Cough, Shortness of Breath, and Hemoptysis      ED Diagnosis:   Final diagnoses:   Pneumonia due to infectious organism, unspecified laterality, unspecified part of lung       Code Status: Full Code    Allergies: No Known Allergies    Patient Story: Patient has had a worsening cough x4 days  Focused Assessment:  Coarse breath sounds, and frequent cough    Treatments and/or interventions provided: Labs, Imaging, Abs  Patient's response to treatments and/or interventions: unchanged    To be done/followed up on inpatient unit:  manage PNA, IV Abx    Does this patient have any cognitive concerns?: patient is deaf    Activity level - Baseline/Home:  Stand with Assist  Activity Level - Current:   Stand with Assist    Patient's Preferred language: American Sign Language   Needed?: No    Isolation: None  Infection: Not Applicable  Patient tested for COVID 19 prior to admission: YES  Bariatric?: No    Vital Signs:   Vitals:    10/08/22 0724 10/08/22 0743 10/08/22 0744   BP: (!) 201/71  (!) 137/93   Pulse: 82     Resp: 24     Temp: 98.1  F (36.7  C) 99.8  F (37.7  C)    TempSrc:  Oral    SpO2: (!) 86% 98%    Weight: 72.1 kg (159 lb)         Cardiac Rhythm:     Was the PSS-3 completed:   Yes  What interventions are required if any?               Family Comments: At bedside  OBS brochure/video discussed/provided to patient/family: N/A              Name of person given brochure if not patient: NA              Relationship to patient: NA    For the majority of the shift this patient's behavior was Green.   Behavioral interventions performed were emotional support.    ED NURSE PHONE NUMBER: [phone]

## 2022-10-08 NOTE — H&P
[facility]    History and Physical  Hospitalist       Date of Admission:  10/8/2022    Assessment & Plan      This is an 87-year-old male with history of atrial fibrillation, severe aortic stenosis, status post TAVR, post TAVR complete heart block, status post pacemaker placement, congestive heart failure, chronic kidney disease stage III, coronary artery disease, status post CABG, hypertension, hyperlipidemia, asthma and deaf.  History is obtained from the patient using the sign .  The patient presented to the ER with complaint of cough, hemoptysis and right-sided chest pain.    ASSESSMENT AND PLAN:     1.  Acute hypoxic respiratory failure/community-acquired pneumonia/Hemoptysis:  This is an 87-year-old male with multiple comorbidities.  He presented with cough, right-sided chest pain, and hemoptysis.  Chest x-ray shows right upper lobe consolidation.  CT scan of the chest done in the ED did show patchy and ground-glass opacities seen on the right upper lobe with similar mild opacity in the right lower lobe and right middle lobe, likely secondary to pneumonia.  At this time, I will obtain the sputum for Gram stain and culture.  Blood cultures already obtained.  I will send blood for mycoplasma antibodies, IgM and IgG, urine for pneumococcal and legionella antigen.  Start him on IV ceftriaxone and doxycycline.  Hold azithromycin as interaction with Pradaxa.  His hemoptysis is likely because of the pneumonia and being on Pradaxa.  We will keep an eye on it.  If it worsens, then we may need to hold the Pradaxa.  I will give him a day off Pradaxa today and restart Pradaxa from tomorrow.  He was hypoxic to 86% on room air.  It was because of pneumonia.  He is on supplemental oxygen 6 liters and saturating 98% with that.  Will start him on aggressive pulmonary toilet with incentive spirometry flutter.  We will keep him on nebulization with DuoNeb and Pulmicort as he used at home.  I  will put him on IV Solu-Medrol 40 mg daily as well.  2.  Atrial fibrillation:  Has been rate controlled. History of complete heart block post-TAVR.  He is status post pacemaker placement.  No complete heart block at this time and is reasonably controlled at this point.  Continue with the metoprolol.  3.  History of hypertension: On hydralazine on metoprolol.  I will continue with that.  4.  History of asthma: Not in acute exacerbation at this point.  He is on Singulair, Pulmicort nebulization, DuoNeb nebulization and will continue with that.    5.  Aortic stenosis, status post TAVR:  Has been stable at this time.  6.  Gastroesophageal reflux disease: On Protonix.  We will continue with that.  7.  Chronic kidney disease stage III: Baseline creatinine anywhere from 1.6 to 1.8.  His creatinine is baseline level at this time.  8.  Lower extremity edema:  This is multifactorial secondary to chronic kidney disease, CHF.  He is on torsemide 10 mg daily.  I will continue with that.  9.  Coronary artery disease:  Has been stable.  Troponin negative.  EKG not ischemic. No central chest pain at this point.  Continue his medications at home, Imdur, metoprolol, Zocor at this time.    10.  GI prophylaxis with Protonix.  11.  DVT prophylaxis with Pradaxa.    CODE STATUS:  I had a detailed discussion with the patient regarding his code status and he wanted to be FULL CODE at this time.  Previously, he has been DNR/DNI during his last admission, but before that, was full code, so AT THIS POINT, HE WANTED TO BE FULL CODE.    The case was discussed with ER physician and the nursing staff taking care of the patient.    Omar Figueredo MD      DVT Prophylaxis: DOAC  Code Status: Full Code    Disposition: Expected discharge in 2 days once stable    Omar Figueredo MD, MD    Primary Care Physician   Dany Rodriguez MD    Chief Complaint   Cough, hemoptysis    History is obtained from the patient    History of Present Illness    Admitted: 10/08/2022    HISTORY OF PRESENT ILLNESS:  This is an 87-year-old male with history of atrial fibrillation, severe aortic stenosis, status post TAVR, post TAVR complete heart block, status post pacemaker placement, congestive heart failure, chronic kidney disease stage III, coronary artery disease, status post CABG, hypertension, hyperlipidemia, asthma and deaf.  History is obtained from the patient using the sign .  The patient presented to the ER with complaint of cough, hemoptysis and right-sided chest pain.    According to the patient, he was fine for the last few days. He has been having some coughing and today, started having blood in his phlegm.  He also noticed right-sided chest pain and some occasional shortness of breath, so he came to the ER.  The patient denies any fever, chills, headache, dizziness, lightheadedness.  No abdominal pain, back pain, dysuria, hematuria, constipation, or diarrhea.  He has no recent travel.  No sick contact.  The patient has been vaccinated for COVID.  Otherwise, the rest of the review of systems is negative.    On workup in the ED, found to have right upper lobe consolidation, and subsequently, the hospitalist service was consulted to admit the patient for pneumonia and hemoptysis.    Past Medical History    I have reviewed this patient's medical history and updated it with pertinent information if needed.   Past Medical History:   Diagnosis Date     Allergic rhinitis, cause unspecified      Anemia, unspecified      Aortic stenosis S/P TAVR      Benign neoplasm of colon 1999    Ademonatous polyp     CHF 2nd to TAVR -- S/P Pacemaker 12/2020     CKD (chronic kidney disease) stage 3, GFR 30-59 ml/min (H) 05/12/2011     Community acquired pneumonia 9/29/2020     Coronary atherosclerosis of unspecified type of vessel, native or graft     s/p CABG 2002     Esophageal ulcer w UGI bleed 05/24/2020    Had EGD adn caurtery 5/24/20, colonoscopy with  diverticulosis then     Hyperlipidemia LDL goal < 100      Hypertension goal BP (blood pressure) < 140/90      Localized osteoarthrosis not specified whether primary or secondary, lower leg     left knee     Mild persistent asthma without complication 1/11/2016     Moderate persistent asthma      Persistent atrial fibrillation (H)      Unspecified hearing loss        Past Surgical History   I have reviewed this patient's surgical history and updated it with pertinent information if needed.  Past Surgical History:   Procedure Laterality Date     CARDIAC SURGERY       COLONOSCOPY N/A 5/26/2020    Procedure: COLONOSCOPY;  Surgeon: Ignacio Fournier MD;  Location:  GI     CV ANGIOGRAM CORONARY GRAFT N/A 4/24/2020    Procedure: Angiogram Coronary Graft;  Surgeon: Addison Willard MD;  Location:  HEART CARDIAC CATH LAB     CV CORONARY ANGIOGRAM N/A 4/24/2020    Procedure: Coronary Angiogram;  Surgeon: Addison Willard MD;  Location:  HEART CARDIAC CATH LAB     CV RIGHT HEART CATH MEASUREMENTS RECORDED N/A 4/24/2020    Procedure: Right Heart Cath;  Surgeon: Addison Willard MD;  Location:  HEART CARDIAC CATH LAB     CV TRANSCATHETER AORTIC VALVE REPLACEMENT N/A 7/14/2020    Procedure: Transcatheter Aortic Valve Replacement;  Surgeon: Soraida Monet MD;  Location:  HEART CARDIAC CATH LAB     EP PACEMAKER N/A 7/15/2020    Procedure: EP Pacemaker;  Surgeon: Tommie Sauer MD;  Location:  HEART CARDIAC CATH LAB     HC ESOPHAGOSCOPY, DIAGNOSTIC  5/03    Dr. Dow, lower esophageal ring & mild gastritis     HERNIORRHAPHY INGUINAL Right 9/26/2016    Procedure: HERNIORRHAPHY INGUINAL;  Surgeon: Alexis Alexandra MD;  Location: [facility]     LAPAROSCOPIC CHOLECYSTECTOMY  12/03    for biliary sludge     REVERSE ARTHROPLASTY SHOULDER Right 2/25/2022    Procedure: RIGHT REVERSE SHOULDER ARTHROPLASTY, WITH OPEN REDUCTION INTERNAL FIXATION, WITH NO CUSTOM GUIDE;  Surgeon: Wiley Vargas  MD Jaxon;  Location:  OR     [facility] NONSPECIFIC PROCEDURE  5/02    Coronary artery bypass     [facility] COLONOSCOPY THRU STOMA W BIOPSY/CAUTERY TUMOR/POLYP/LESION  5/03    Dr. Dow, Q 5 years     [facility] COLONOSCOPY THRU STOMA W BIOPSY/CAUTERY TUMOR/POLYP/LESION  12/199    Dr. Dow, one adenomatous polyp       Prior to Admission Medications   Prior to Admission Medications   Prescriptions Last Dose Informant Patient Reported? Taking?   acetaminophen (TYLENOL) 500 MG tablet Past Week at Unknown time Self Yes Yes   Sig: Take 1,000 mg by mouth every 8 hours as needed   albuterol (PROAIR HFA/PROVENTIL HFA/VENTOLIN HFA) 108 (90 Base) MCG/ACT inhaler Past Week at Unknown time Self No Yes   Sig: INHALE 1 TO 2 PUFFS BY MOUTH EVERY 4 TO 6 HOURS AS NEEDED   aspirin (ASA) 81 MG EC tablet 10/8/2022 at morning Self Yes Yes   Sig: Take 81 mg by mouth daily   budesonide (PULMICORT) 0.5 MG/2ML nebulizer solution 10/8/2022 at morning Self No Yes   Sig: Take 2 mLs (0.5 mg) by nebulization 2 times daily   calcium carbonate 600 mg-vitamin D 400 units (CALTRATE) 600-400 MG-UNIT per tablet 10/8/2022 at morning Self Yes Yes   Sig: Take 1 tablet by mouth daily   dabigatran ANTICOAGULANT (PRADAXA) 150 MG capsule 10/8/2022 at morning Self No Yes   Sig: Take 1 capsule (150 mg) by mouth 2 times daily   ferrous sulfate (FE TABS) 325 (65 Fe) MG EC tablet 10/8/2022 at morning Self Yes Yes   Sig: Take 325 mg by mouth daily   hydrALAZINE (APRESOLINE) 25 MG tablet 10/8/2022 at morning Self No Yes   Sig: Take 0.5 tablets (12.5 mg) by mouth 3 times daily   ipratropium - albuterol 0.5 mg/2.5 mg/3 mL (DUONEB) 0.5-2.5 (3) MG/3ML nebulization 10/8/2022 at Unknown time Self No Yes   Sig: Inhale 1 vial (3 mLs) into the lungs 4 times daily   isosorbide mononitrate (IMDUR) 30 MG 24 hr tablet 10/8/2022 at Unknown time Self No Yes   Sig: Take 1 tablet (30 mg) by mouth daily   magnesium chloride (MAG64) 535 (64 Mg) MG TBEC CR tablet 10/8/2022 at Unknown time Self No  Yes   Sig: Take 1 tablet (535 mg) by mouth 2 times daily   melatonin 3 MG tablet Past Week at Unknown time Self No Yes   Sig: Take 1 tablet (3 mg) by mouth nightly as needed for sleep   metoprolol tartrate (LOPRESSOR) 25 MG tablet 10/8/2022 at Unknown time Self No Yes   Sig: TAKE 1 TABLET BY MOUTH TWICE A DAY   montelukast (SINGULAIR) 10 MG tablet 10/7/2022 at Unknown time Self No Yes   Sig: Take 1 tablet (10 mg) by mouth daily   multivitamin, therapeutic (THERA-VIT) TABS tablet 10/8/2022 at Unknown time Self Yes Yes   Sig: Take 1 tablet by mouth 2 times daily   nitroGLYcerin (NITROSTAT) 0.4 MG sublingual tablet More than a month at Unknown time Self No Yes   Sig: For chest pain place 1 tablet under the tongue every 5 minutes for 3 doses. If symptoms persist 5 minutes after 1st dose call 911.   pantoprazole (PROTONIX) 40 MG EC tablet 10/8/2022 at Unknown time Self No Yes   Sig: TAKE 1 TABLET BEFORE MEALS TWICE A DAY   simvastatin (ZOCOR) 40 MG tablet 10/7/2022 at Unknown time Self No Yes   Sig: Take 1 tablet (40 mg) by mouth At Bedtime   torsemide (DEMADEX) 5 MG tablet 10/8/2022 at Unknown time Self No Yes   Sig: Patient taking 10 mg in the morning. Take additional 5mg if weight gain of 2-3 lbs in 1 day.      Facility-Administered Medications: None     Allergies   No Known Allergies    Social History   I have reviewed this patient's social history and updated it with pertinent information if needed. Shiraz Ingram  reports that he has never smoked. He has never used smokeless tobacco. He reports previous alcohol use. He reports that he does not use drugs.    Family History   I have reviewed this patient's family history and updated it with pertinent information if needed.   Family History   Problem Relation Age of Onset     C.A.D. Father      Cancer Mother         breast cancer,  of pneumonia     Cerebrovascular Disease Son      CABG Son      Family History Negative Child      Family History Negative Sister       Heart Disease Brother        Review of Systems   CONSTITUTIONAL:  negative  EYES:  negative  HEENT:  negative  RESPIRATORY:  positive for  cough with sputum, hemoptysis and pleuritic pain  CARDIOVASCULAR:  negative  GASTROINTESTINAL:  negative  GENITOURINARY:  negative  INTEGUMENT/BREAST:  negative  HEMATOLOGIC/LYMPHATIC:  negative  ALLERGIC/IMMUNOLOGIC:  negative  ENDOCRINE:  negative  MUSCULOSKELETAL:  negative  NEUROLOGICAL:  negative  BEHAVIOR/PSYCH:  negative    Physical Exam   Temp: 99.8  F (37.7  C) Temp src: Oral BP: (!) 137/93 Pulse: 82   Resp: 24 SpO2: 98 % O2 Device: Nasal cannula Oxygen Delivery: 6 LPM  Vital Signs with Ranges  Temp:  [98.1  F (36.7  C)-99.8  F (37.7  C)] 99.8  F (37.7  C)  Pulse:  [82] 82  Resp:  [24] 24  BP: (137-201)/(71-93) 137/93  SpO2:  [86 %-98 %] 98 %  159 lbs 0 oz    Constitutional: Awake, alert, cooperative, no apparent distress.  Eyes: Conjunctiva and pupils examined and normal.  HEENT: Moist mucous membranes, normal dentition.  Respiratory: Clear to auscultation on left, right sided Crackles posteriorly and upper lung, with occasional  wheeze.  Cardiovascular: Irregularly irregular rhythm, normal S1 and S2, and no murmur noted.  GI: Soft, non-distended, non-tender, normal bowel sounds.  Lymph/Hematologic: No anterior cervical or supraclavicular adenopathy.  Skin: No rashes, no cyanosis, 2-3+ edema.  Musculoskeletal: No joint swelling, erythema or tenderness.  Neurologic: Cranial nerves 2-12 intact, normal strength and sensation.  Psychiatric: Alert, oriented to person, place and time, no obvious anxiety or depression.    Data   Data reviewed today:  I personally reviewed the EKG tracing showing Afib with RBBB, no acute ischemic changes. .  Recent Labs   Lab 10/08/22  0756   WBC 5.7   HGB 11.2*   MCV 68*   *      POTASSIUM 4.3   CHLORIDE 105   CO2 27   BUN 51*   CR 1.79*   ANIONGAP 9   AYALA 9.1   *   ALBUMIN 3.8   PROTTOTAL 7.8   BILITOTAL 0.7   ALKPHOS 86    ALT 38   AST 31       Recent Results (from the past 24 hour(s))   XR Chest 2 Views    Narrative    EXAM: XR CHEST 2 VIEWS  LOCATION: [facility]  DATE/TIME: 10/8/2022 8:20 AM    INDICATION: cough with hemoptysis, hypoxia  COMPARISON: 09/02/2021      Impression    IMPRESSION: New large rounded masslike area of consolidation in the right upper lobe measuring 11 x 7 cm, could represent infection or malignancy. Recommend chest CT for further characterization. Stable elevated left hemidiaphragm. No significant pleural   effusion or pneumothorax. Pacemaker. Median sternotomy. TAVR.   Chest CT w/o contrast    Narrative    EXAM: CT CHEST W/O CONTRAST  LOCATION: [facility]  DATE/TIME: 10/8/2022 9:44 AM    INDICATION: Hypoxia, hemoptysis, mass noted on chest x ray, define further with CT scan  COMPARISON: Chest x-ray earlier today and chest CT 09/28/2020  TECHNIQUE: CT chest without IV contrast. Multiplanar reformats were obtained. Dose reduction techniques were used.  CONTRAST: None.    FINDINGS:   LUNGS AND PLEURA: Focal area of dense consolidative opacities with surrounding groundglass opacities centered on the right upper lobe with similar milder opacities in the superior segment of the right lower lobe and the right middle lobe. Off note,   patchy and nodular opacities were also present in the right upper lobe in 2020. Small bilateral pleural effusions. Few scattered small pulmonary nodules, several of which are stable. For example, there is a stable 4 mm right lower lobe nodule (series 4,   image 196), 3 mm left upper lobe nodule (image 100) and 3 mm left upper lobe nodule (image 69)..    MEDIASTINUM/AXILLAE: Mild right hilar lymphadenopathy. For example, there is a 1.3 cm right hilar lymph node (series 3, image 66). Few prominent mediastinal lymph nodes. For example, there is a 0.8 cm right paraesophageal lymph node (series 3, image 25),   0.6 cm precarinal lymph  node (image 44) and 1.0 cm subcarinal lymph node. Left subclavian transvenous pacemaker. TAVR. Small hiatal hernia. No pericardial effusion. Mild generalized cardiomegaly.    CORONARY ARTERY CALCIFICATION: CABG.    UPPER ABDOMEN: Multiple rounded hypodense and hyperdense lesions in the visualized portions of the kidneys. These are not fully characterized on noncontrast CT. Cholecystectomy.    MUSCULOSKELETAL: Right shoulder arthroplasty. Degenerative changes in the spine. Median sternotomy. No suspicious lesions in the bones. Elevated left hemidiaphragm. Wedge compression deformities of several thoracic and visualized upper lumbar vertebral   bodies, likely chronic.      Impression    IMPRESSION:   1.  Patchy and groundglass opacities centered on the right upper lobe with similar mild opacities in the right lower lobe and right middle lobe, likely due to pneumonia. A follow-up CT in 8-12 weeks after treatment is recommended to ensure resolution.  2.  Small bilateral pleural effusions.  3.  Few other scattered small pulmonary nodules measuring up to 4 mm are stable since 09/28/2020.  4.  Mild right hilar lymphadenopathy and few prominent mediastinal lymph nodes, nonspecific and likely reactive.  5.  Mild generalized cardiomegaly.  6.  Multiple hypodense and hyperdense lesions in the kidneys are indeterminate on noncontrast CT. These are favored to be cysts containing hemorrhagic or proteinaceous material. Comparison with prior imaging if available can be performed or a follow-up   nonemergent adrenal MRI can be considered.

## 2022-10-08 NOTE — ED PROVIDER NOTES
History   Chief Complaint:  Cough, Shortness of Breath, and Hemoptysis       The history is provided by the patient and a relative. A  was used (Son, ASL).      Shiraz Ingram is an anticoagulated (Pradaxa) 87 year old male with history of emphysema, atrial fibrillation, stage 3 CKD and anemia who presents with shortness of breath, flank pain and hemoptysis. Patient reports he had had right flank pain near his ribs for 3 days, and developed a bloody cough this morning (with mucus). He did not have a cough prior to today.  Endorses chills and rhinnorhea. Has leg swelling and labored breathing at baseline due to his emphysema. Denies chest pain, palpitations, fever, black/bloody stool, sore throat, light-headedness or faintness. No recent long distance travel. Patient is vaccinated against COVID-19 with boosters. Has O2 at home.     Review of Systems   Constitutional: Negative for fever.   HENT: Positive for rhinorrhea. Negative for sore throat.    Respiratory: Positive for cough (blood) and shortness of breath.    Cardiovascular: Positive for leg swelling. Negative for chest pain and palpitations.   Gastrointestinal: Negative for blood in stool.   Genitourinary: Positive for flank pain.   Neurological: Negative for light-headedness.        NEG - faintness   All other systems reviewed and are negative.    Allergies:  No Known Allergies    Medications:  albuterol (PROAIR HFA/PROVENTIL HFA/VENTOLIN HFA) 108 (90 Base) MCG/ACT inhaler  aspirin (ASA) 81 MG EC tablet  budesonide (PULMICORT) 0.5 MG/2ML nebulizer solution  dabigatran ANTICOAGULANT (PRADAXA) 150 MG capsule  ferrous sulfate (IRON) 325 (65 Fe) MG tablet  hydrALAZINE (APRESOLINE) 25 MG tablet  ipratropium - albuterol 0.5 mg/2.5 mg/3 mL (DUONEB) 0.5-2.5 (3) MG/3ML nebulization  isosorbide mononitrate (IMDUR) 30 MG 24 hr tablet  magnesium chloride (MAG64) 535 (64 Mg) MG TBEC CR tablet  metoprolol tartrate (LOPRESSOR) 25 MG tablet  montelukast  (SINGULAIR) 10 MG tablet  nitroGLYcerin (NITROSTAT) 0.4 MG sublingual tablet  pantoprazole (PROTONIX) 40 MG EC tablet  simvastatin (ZOCOR) 40 MG tablet  torsemide (DEMADEX) 5 MG tablet    Past Medical History:     Allergic rhinitis  Anemia  Benign neoplasm of colon  CHF  CKD stage III  Pneumonia  Coronary atherosclerosis  Esophageal ulcer with upper GI bleed  Hypertension  Hyperlipidemia  Osteoarthrosis  Asthma  Persistent atrial fibrillation  Hearing loss  Osteoporosis  Iron deficiency anemia  Vitamin B12 deficiency  Coronary artery disease   Hyperkalemia  Thrombus of left atrial appendage  Aortic stenosis, severe  Acute respiratory failure with hypoxia  Anemia of chronic disease  Thrombocytopenia    Past Surgical History:    Cardiac surgery  Colonoscopy x3  Coronary angiogram graft  Right heart catheterization  Aortic valve replacement  Pacemaker placement  EGD  Inguinal hernia repair, right  Cholecystectomy  Coronary artery bypass      Family History:    Father: coronary artery disease  Mother: breast cancer  Brother: heart disease   Son: cerebrovascular disease, CABG     Social History:  The patient presents to the ED with son  Living Situation: Lives with son  PCP: Dany Rodriguez     Physical Exam     Patient Vitals for the past 24 hrs:   BP Temp Temp src Pulse Resp SpO2 Weight   10/08/22 0744 (!) 137/93 -- -- -- -- -- --   10/08/22 0743 -- 99.8  F (37.7  C) Oral -- -- 98 % --   10/08/22 0724 (!) 201/71 98.1  F (36.7  C) -- 82 24 (!) 86 % 72.1 kg (159 lb)     Physical Exam  SKIN:  Warm, dry.  HEMATOLOGIC/IMMUNOLOGIC/LYMPHATIC:  No pallor.  Bilateral lower extremity edema, symmetric.  HENT: No jugular venous distention.  EYES:  Conjunctivae normal.  CARDIOVASCULAR:  Regular rate and rhythm.  No murmur.  RESPIRATORY: Tachypneic, breath sounds equal and normal.  GASTROINTESTINAL:  Soft, nontender abdomen.  MUSCULOSKELETAL: Normal body habitus.  NEUROLOGIC:  Alert, conversant.  PSYCHIATRIC:  Normal  mood.    Emergency Department Course   ECG  ECG taken at 0746, ECG read at 0750  Atrial fibrillation.   Right bundle branch block.   Inferior infarct, age undetermined.   Anteroseptal infarct, age undetermined.    No significant change as compared to prior, dated 3/15/21.  Rate 70 bpm. MT interval * ms. QRS duration 132 ms. QT/QTc 438/473 ms. P-R-T axes * 255 46.     Imaging:  Chest CT w/o contrast   Final Result   IMPRESSION:    1.  Patchy and groundglass opacities centered on the right upper lobe with similar mild opacities in the right lower lobe and right middle lobe, likely due to pneumonia. A follow-up CT in 8-12 weeks after treatment is recommended to ensure resolution.   2.  Small bilateral pleural effusions.   3.  Few other scattered small pulmonary nodules measuring up to 4 mm are stable since 09/28/2020.   4.  Mild right hilar lymphadenopathy and few prominent mediastinal lymph nodes, nonspecific and likely reactive.   5.  Mild generalized cardiomegaly.   6.  Multiple hypodense and hyperdense lesions in the kidneys are indeterminate on noncontrast CT. These are favored to be cysts containing hemorrhagic or proteinaceous material. Comparison with prior imaging if available can be performed or a follow-up    nonemergent adrenal MRI can be considered.      XR Chest 2 Views   Final Result   IMPRESSION: New large rounded masslike area of consolidation in the right upper lobe measuring 11 x 7 cm, could represent infection or malignancy. Recommend chest CT for further characterization. Stable elevated left hemidiaphragm. No significant pleural    effusion or pneumothorax. Pacemaker. Median sternotomy. TAVR.        Report per radiology    Laboratory:  Labs Ordered and Resulted from Time of ED Arrival to Time of ED Departure   COMPREHENSIVE METABOLIC PANEL - Abnormal       Result Value    Sodium 141      Potassium 4.3      Chloride 105      Carbon Dioxide (CO2) 27      Anion Gap 9      Urea Nitrogen 51 (*)      Creatinine 1.79 (*)     Calcium 9.1      Glucose 122 (*)     Alkaline Phosphatase 86      AST 31      ALT 38      Protein Total 7.8      Albumin 3.8      Bilirubin Total 0.7      GFR Estimate 36 (*)    CBC WITH PLATELETS AND DIFFERENTIAL - Abnormal    WBC Count 5.7      RBC Count 5.50      Hemoglobin 11.2 (*)     Hematocrit 37.6 (*)     MCV 68 (*)     MCH 20.4 (*)     MCHC 29.8 (*)     RDW 18.2 (*)     Platelet Count 115 (*)     % Neutrophils 87      % Lymphocytes 5      % Monocytes 6      % Eosinophils 2      % Basophils 0      % Immature Granulocytes 0      NRBCs per 100 WBC 0      Absolute Neutrophils 4.9      Absolute Lymphocytes 0.3 (*)     Absolute Monocytes 0.4      Absolute Eosinophils 0.1      Absolute Basophils 0.0      Absolute Immature Granulocytes 0.0      Absolute NRBCs 0.0     ISTAT GASES LACTATE VENOUS POCT - Abnormal    Lactic Acid POCT 0.9      Bicarbonate Venous POCT 30 (*)     O2 Sat, Venous POCT 38 (*)     pCO2V Venous POCT 63 (*)     pH Venous POCT 7.28 (*)     pO2 Venous POCT 26     TROPONIN I - Normal    Troponin I High Sensitivity 23     INFLUENZA A/B & SARS-COV2 PCR MULTIPLEX - Normal    Influenza A PCR Negative      Influenza B PCR Negative      RSV PCR Negative      SARS CoV2 PCR Negative     BLOOD CULTURE   BLOOD CULTURE      Emergency Department Course:    Reviewed:  I reviewed nursing notes, vitals, past medical history and Care Everywhere    Assessments/Consults:  ED Course as of 10/08/22 1041   Sat Oct 08, 2022   0733 I obtained history and examined the patient.    1038 I spoke to Dr. Figueredo, hospitalist, who accepts the patient for admission.      Interventions:  Medications   cefTRIAXone (ROCEPHIN) 2 g vial to attach to  ml bag for ADULTS or NS 50 ml bag for PEDS (has no administration in time range)   azithromycin (ZITHROMAX) 500 mg vial to attach to  mL bag (has no administration in time range)     Disposition:  The patient was admitted to the hospital under the care of  Dr. Figueredo.     Impression & Plan     CMS Diagnoses: None    Medical Decision Making:  This patient presented with concern of hemoptysis as well as some degree of increased work of breathing.  Arrived hypoxic with SPO2 of 86% on room air.  This resolved with supplemental nasal cannula oxygen.  Evaluation performed as above revealed an abnormal chest x-ray and with concern of neoplastic disease CT scan chest was recommended by the radiologist.  This advanced imaging was most consistent with infectious disease/pneumonia.  Given the patient's anticoagulation I doubt associated pulmonary embolism.  The patient be admitted for treatment given his hypoxia and symptoms and degree of pneumonia.  Negative lactic acid and hemodynamically stable so I do not believe the patient is septic at this time.    Diagnosis:    ICD-10-CM    1. Pneumonia due to infectious organism, unspecified laterality, unspecified part of lung  J18.9      Scribe Disclosure:  CE RUTH, am serving as a scribe at 7:32 AM on 10/8/2022 to document services personally performed by Jorge Jc MD based on my observations and the provider's statements to me.        Jorge Jc MD  10/08/22 1104

## 2022-10-08 NOTE — PROGRESS NOTES
Received patient from ED at 1240. A/Ox4, VSS except T-max 100.0, on 1L NC, denies pain, N/V. Patient is deaf, utilized , denies SOB, chest pain.  Voiding in bathroom, SBA, tolerating regular diet. PIV infusing fluids, potassium within range, AM redraw. Permanent pacemaker in left chest, urine and sputum culture pending.

## 2022-10-09 ENCOUNTER — APPOINTMENT (OUTPATIENT)
Dept: OCCUPATIONAL THERAPY | Facility: CLINIC | Age: 87
DRG: 193 | End: 2022-10-09
Attending: INTERNAL MEDICINE
Payer: COMMERCIAL

## 2022-10-09 LAB
ANION GAP SERPL CALCULATED.3IONS-SCNC: 7 MMOL/L (ref 3–14)
BACTERIA SPT CULT: NORMAL
BASOPHILS # BLD AUTO: 0 10E3/UL (ref 0–0.2)
BASOPHILS NFR BLD AUTO: 0 %
BUN SERPL-MCNC: 54 MG/DL (ref 7–30)
CALCIUM SERPL-MCNC: 9.1 MG/DL (ref 8.5–10.1)
CHLORIDE BLD-SCNC: 106 MMOL/L (ref 94–109)
CO2 SERPL-SCNC: 24 MMOL/L (ref 20–32)
CREAT SERPL-MCNC: 1.65 MG/DL (ref 0.66–1.25)
EOSINOPHIL # BLD AUTO: 0 10E3/UL (ref 0–0.7)
EOSINOPHIL NFR BLD AUTO: 1 %
ERYTHROCYTE [DISTWIDTH] IN BLOOD BY AUTOMATED COUNT: 17.6 % (ref 10–15)
FERRITIN SERPL-MCNC: 390 NG/ML (ref 26–388)
GFR SERPL CREATININE-BSD FRML MDRD: 40 ML/MIN/1.73M2
GLUCOSE BLD-MCNC: 102 MG/DL (ref 70–99)
GRAM STAIN RESULT: NORMAL
HCT VFR BLD AUTO: 32.3 % (ref 40–53)
HGB BLD-MCNC: 9.6 G/DL (ref 13.3–17.7)
HGB BLD-MCNC: 9.7 G/DL (ref 13.3–17.7)
IMM GRANULOCYTES # BLD: 0 10E3/UL
IMM GRANULOCYTES NFR BLD: 0 %
IRON SATN MFR SERPL: 12 % (ref 15–46)
IRON SERPL-MCNC: 40 UG/DL (ref 35–180)
LYMPHOCYTES # BLD AUTO: 0.5 10E3/UL (ref 0.8–5.3)
LYMPHOCYTES NFR BLD AUTO: 11 %
MCH RBC QN AUTO: 20.1 PG (ref 26.5–33)
MCHC RBC AUTO-ENTMCNC: 29.7 G/DL (ref 31.5–36.5)
MCV RBC AUTO: 68 FL (ref 78–100)
MONOCYTES # BLD AUTO: 0.3 10E3/UL (ref 0–1.3)
MONOCYTES NFR BLD AUTO: 8 %
NEUTROPHILS # BLD AUTO: 3.7 10E3/UL (ref 1.6–8.3)
NEUTROPHILS NFR BLD AUTO: 80 %
NRBC # BLD AUTO: 0 10E3/UL
NRBC BLD AUTO-RTO: 0 /100
PLATELET # BLD AUTO: 91 10E3/UL (ref 150–450)
POTASSIUM BLD-SCNC: 4.7 MMOL/L (ref 3.4–5.3)
RBC # BLD AUTO: 4.78 10E6/UL (ref 4.4–5.9)
SODIUM SERPL-SCNC: 137 MMOL/L (ref 133–144)
TIBC SERPL-MCNC: 330 UG/DL (ref 240–430)
WBC # BLD AUTO: 4.6 10E3/UL (ref 4–11)

## 2022-10-09 PROCEDURE — 82728 ASSAY OF FERRITIN: CPT | Performed by: HOSPITALIST

## 2022-10-09 PROCEDURE — 94640 AIRWAY INHALATION TREATMENT: CPT

## 2022-10-09 PROCEDURE — 250N000011 HC RX IP 250 OP 636: Performed by: INTERNAL MEDICINE

## 2022-10-09 PROCEDURE — 999N000157 HC STATISTIC RCP TIME EA 10 MIN

## 2022-10-09 PROCEDURE — 85018 HEMOGLOBIN: CPT | Performed by: HOSPITALIST

## 2022-10-09 PROCEDURE — 36415 COLL VENOUS BLD VENIPUNCTURE: CPT | Performed by: INTERNAL MEDICINE

## 2022-10-09 PROCEDURE — 36415 COLL VENOUS BLD VENIPUNCTURE: CPT | Performed by: HOSPITALIST

## 2022-10-09 PROCEDURE — 97535 SELF CARE MNGMENT TRAINING: CPT | Mod: GO | Performed by: OCCUPATIONAL THERAPIST

## 2022-10-09 PROCEDURE — 85004 AUTOMATED DIFF WBC COUNT: CPT | Performed by: INTERNAL MEDICINE

## 2022-10-09 PROCEDURE — 94640 AIRWAY INHALATION TREATMENT: CPT | Mod: 76

## 2022-10-09 PROCEDURE — 250N000013 HC RX MED GY IP 250 OP 250 PS 637: Performed by: INTERNAL MEDICINE

## 2022-10-09 PROCEDURE — 83550 IRON BINDING TEST: CPT | Performed by: HOSPITALIST

## 2022-10-09 PROCEDURE — 250N000009 HC RX 250: Performed by: INTERNAL MEDICINE

## 2022-10-09 PROCEDURE — 250N000013 HC RX MED GY IP 250 OP 250 PS 637: Performed by: HOSPITALIST

## 2022-10-09 PROCEDURE — 97110 THERAPEUTIC EXERCISES: CPT | Mod: GO | Performed by: OCCUPATIONAL THERAPIST

## 2022-10-09 PROCEDURE — 120N000001 HC R&B MED SURG/OB

## 2022-10-09 PROCEDURE — 99232 SBSQ HOSP IP/OBS MODERATE 35: CPT | Performed by: HOSPITALIST

## 2022-10-09 PROCEDURE — 80048 BASIC METABOLIC PNL TOTAL CA: CPT | Performed by: INTERNAL MEDICINE

## 2022-10-09 PROCEDURE — 97165 OT EVAL LOW COMPLEX 30 MIN: CPT | Mod: GO | Performed by: OCCUPATIONAL THERAPIST

## 2022-10-09 RX ORDER — FERROUS SULFATE 325(65) MG
325 TABLET ORAL 2 TIMES DAILY
Status: DISCONTINUED | OUTPATIENT
Start: 2022-10-09 | End: 2022-10-10 | Stop reason: HOSPADM

## 2022-10-09 RX ADMIN — TORSEMIDE 10 MG: 10 TABLET ORAL at 08:08

## 2022-10-09 RX ADMIN — DABIGATRAN ETEXILATE MESYLATE 150 MG: 150 CAPSULE ORAL at 08:08

## 2022-10-09 RX ADMIN — PANTOPRAZOLE SODIUM 40 MG: 40 TABLET, DELAYED RELEASE ORAL at 08:08

## 2022-10-09 RX ADMIN — DOXYCYCLINE 100 MG: 100 INJECTION, POWDER, LYOPHILIZED, FOR SOLUTION INTRAVENOUS at 12:16

## 2022-10-09 RX ADMIN — HYDRALAZINE HYDROCHLORIDE 12.5 MG: 25 TABLET ORAL at 13:51

## 2022-10-09 RX ADMIN — IPRATROPIUM BROMIDE AND ALBUTEROL SULFATE 3 ML: .5; 3 SOLUTION RESPIRATORY (INHALATION) at 16:34

## 2022-10-09 RX ADMIN — BUDESONIDE 0.5 MG: 0.5 INHALANT RESPIRATORY (INHALATION) at 08:15

## 2022-10-09 RX ADMIN — BUDESONIDE 0.5 MG: 0.5 INHALANT RESPIRATORY (INHALATION) at 19:57

## 2022-10-09 RX ADMIN — CEFTRIAXONE SODIUM 2 G: 2 INJECTION, POWDER, FOR SOLUTION INTRAMUSCULAR; INTRAVENOUS at 11:46

## 2022-10-09 RX ADMIN — MONTELUKAST 10 MG: 10 TABLET, FILM COATED ORAL at 20:42

## 2022-10-09 RX ADMIN — FERROUS SULFATE TAB 325 MG (65 MG ELEMENTAL FE) 325 MG: 325 (65 FE) TAB at 08:08

## 2022-10-09 RX ADMIN — HYDRALAZINE HYDROCHLORIDE 12.5 MG: 25 TABLET ORAL at 08:08

## 2022-10-09 RX ADMIN — IPRATROPIUM BROMIDE AND ALBUTEROL SULFATE 3 ML: .5; 3 SOLUTION RESPIRATORY (INHALATION) at 19:57

## 2022-10-09 RX ADMIN — ASPIRIN 81 MG: 81 TABLET, COATED ORAL at 08:08

## 2022-10-09 RX ADMIN — DOXYCYCLINE 100 MG: 100 INJECTION, POWDER, LYOPHILIZED, FOR SOLUTION INTRAVENOUS at 23:29

## 2022-10-09 RX ADMIN — METHYLPREDNISOLONE SODIUM SUCCINATE 40 MG: 40 INJECTION, POWDER, FOR SOLUTION INTRAMUSCULAR; INTRAVENOUS at 11:41

## 2022-10-09 RX ADMIN — IPRATROPIUM BROMIDE AND ALBUTEROL SULFATE 3 ML: .5; 3 SOLUTION RESPIRATORY (INHALATION) at 08:15

## 2022-10-09 RX ADMIN — HYDRALAZINE HYDROCHLORIDE 12.5 MG: 25 TABLET ORAL at 20:48

## 2022-10-09 RX ADMIN — SIMVASTATIN 40 MG: 40 TABLET, FILM COATED ORAL at 21:19

## 2022-10-09 RX ADMIN — METOPROLOL TARTRATE 25 MG: 25 TABLET, FILM COATED ORAL at 08:08

## 2022-10-09 RX ADMIN — METOPROLOL TARTRATE 25 MG: 25 TABLET, FILM COATED ORAL at 20:48

## 2022-10-09 RX ADMIN — ISOSORBIDE MONONITRATE 30 MG: 30 TABLET, EXTENDED RELEASE ORAL at 08:08

## 2022-10-09 RX ADMIN — FERROUS SULFATE TAB 325 MG (65 MG ELEMENTAL FE) 325 MG: 325 (65 FE) TAB at 20:42

## 2022-10-09 RX ADMIN — DABIGATRAN ETEXILATE MESYLATE 150 MG: 150 CAPSULE ORAL at 20:42

## 2022-10-09 ASSESSMENT — ACTIVITIES OF DAILY LIVING (ADL)
ADLS_ACUITY_SCORE: 39

## 2022-10-09 NOTE — PLAN OF CARE
PT: Orders received. Chart reviewed and discussed with OT. Per discussion with OT, patient is completing functional mobility with SBA, OT to continue to address mobility and progressing activity tolerance while patient in IP. No skilled PT interventions indicated as pt's needs are being met with OT.  Defer discharge recommendations to OT.  Will complete orders.

## 2022-10-09 NOTE — PLAN OF CARE
Goal Outcome Evaluation:      Pt is A&Ox4, pt is deaf and communicates via written communication,SBA, VSS on 1L NC, tolerating reg diet, denies pain and appears comfortable, diminished LS, on scheduled nebs and IV abx, lab called regarding a contaminated sputum test, MD updated and re-ordered test, specimen collected and sent to lab, results are pending,voiding adequately in the BR, discharge pending.

## 2022-10-09 NOTE — PROGRESS NOTES
[facility]    Medicine Progress Note - Hospitalist Service    Date of Admission:  10/8/2022    Assessment & Plan            Acute hypoxic respiratory failure from community acquired pneumonia  *P/w with cough, right-sided chest pain, and hemoptysis  * Chest x-ray shows right upper lobe consolidation.    * CT scan of the chest done in the ED did show patchy and ground-glass opacities seen on the right upper lobe with similar mild opacity in the right lower lobe and right middle lobe, likely secondary to pneumonia.    Plan  - wean oxygen as tolerates  - ceftriaxone and doxycycline (azithromycin interacts with pradaxa)  - increase activities today  - etiological workup including legionella, strep urine ag, respiratory culture, and blood cultures are negative  - on solumedrol, duonebs, and pulmicort and incentive spirometry  - follow-up with CT scan in 8-12 weeks to ensure resolution    Hemoptysis suspect due to pneumonia  Microcytic anemia  hgb drop from 11's to 9.6, unclear if due to hemoptysis, dilutional versus orther  Plan  - monitor the hgb this afternoon  - iron studies  - no clinically e/o bleeding, so will continue the pradaxa.    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  He is on Singulair, Pulmicort nebulization, DuoNeb nebulization and will continue with that.      Aortic stenosis, status post TAVR:  Has been stable at this time.    Gastroesophageal reflux disease  Plan  - continue ppi    Chronic kidney disease stage III: Baseline creatinine anywhere from 1.6 to 1.8.   Plan  - stable    Lower extremity edema:  This is multifactorial secondary to chronic kidney disease, CHF.    Plan  - continue torsemide    Coronary artery  disease:  Has been stable.  Troponin negative.  EKG not ischemic. No central chest pain at this point.  Continue his medications at home, Imdur, metoprolol, Zocor at this time.      Renal lesions  Likely cysts containing hemorrhagic or proteinaceous material  Plan  - consider nonemergent adrenal MRI       Diet: Combination Diet Regular Diet Adult  Room Service    DVT Prophylaxis: pradaxa  Pak Catheter: Not present  Central Lines: None  Cardiac Monitoring: ACTIVE order. Indication: afib  Code Status: Full Code      Disposition Plan           The patient's care was discussed with the Patient.    Nnamdi Flores DO  Hospitalist Service  [facility]  Securely message with the Vocera Web Console (learn more here)  Text page via [employer] Paging/Directory         Clinically Significant Risk Factors Present on Admission               # Coagulation Defect: home medication list includes an anticoagulant medication  # Thrombocytopenia: Plts = 91 10e3/uL (Ref range: 150 - 450 10e3/uL) on admission, will monitor for bleeding  # Hypertension: home medication list includes antihypertensive(s)          ______________________________________________________________________    Interval History   Records reviewed. stable    Data reviewed today: I reviewed all medications, new labs and imaging results over the last 24 hours. I personally reviewed the chest CT image(s) showing pneumonia.    Physical Exam   Vital Signs: Temp: 98.3  F (36.8  C) Temp src: Oral BP: (!) 156/77 Pulse: 53   Resp: 18 SpO2: 96 % O2 Device: Nasal cannula Oxygen Delivery: 1 LPM  Weight: 164 lbs 6.4 oz       Constitutional: Awake, alert, cooperative, no apparent distress.  Respiratory: Clear to auscultation on left, right sided Crackles posteriorly and upper lung, with occasional  wheeze.  Cardiovascular: Irregularly irregular rhythm, normal S1 and S2, and no murmur noted.  GI: Soft, non-distended, non-tender, normal bowel  sounds.  Lymph/Hematologic: No anterior cervical or supraclavicular adenopathy.  Skin: No rashes, no cyanosis, 2-3+ edema.  Musculoskeletal: No joint swelling, erythema or tenderness.  Neurologic: Cranial nerves 2-12 intact, normal strength and sensation.  Psychiatric: Alert, oriented to person, place and time, no obvious anxiety or depression.         Data   Recent Labs   Lab 10/09/22  0634 10/08/22  0756   WBC 4.6 5.7   HGB 9.6* 11.2*   MCV 68* 68*   PLT 91* 115*    141   POTASSIUM 4.7 4.3   CHLORIDE 106 105   CO2 24 27   BUN 54* 51*   CR 1.65* 1.79*   ANIONGAP 7 9   AYALA 9.1 9.1   * 122*   ALBUMIN  --  3.8   PROTTOTAL  --  7.8   BILITOTAL  --  0.7   ALKPHOS  --  86   ALT  --  38   AST  --  31     Recent Results (from the past 24 hour(s))   Chest CT w/o contrast    Narrative    EXAM: CT CHEST W/O CONTRAST  LOCATION: [facility]  DATE/TIME: 10/8/2022 9:44 AM    INDICATION: Hypoxia, hemoptysis, mass noted on chest x ray, define further with CT scan  COMPARISON: Chest x-ray earlier today and chest CT 09/28/2020  TECHNIQUE: CT chest without IV contrast. Multiplanar reformats were obtained. Dose reduction techniques were used.  CONTRAST: None.    FINDINGS:   LUNGS AND PLEURA: Focal area of dense consolidative opacities with surrounding groundglass opacities centered on the right upper lobe with similar milder opacities in the superior segment of the right lower lobe and the right middle lobe. Off note,   patchy and nodular opacities were also present in the right upper lobe in 2020. Small bilateral pleural effusions. Few scattered small pulmonary nodules, several of which are stable. For example, there is a stable 4 mm right lower lobe nodule (series 4,   image 196), 3 mm left upper lobe nodule (image 100) and 3 mm left upper lobe nodule (image 69)..    MEDIASTINUM/AXILLAE: Mild right hilar lymphadenopathy. For example, there is a 1.3 cm right hilar lymph node (series 3, image 66).  Few prominent mediastinal lymph nodes. For example, there is a 0.8 cm right paraesophageal lymph node (series 3, image 25),   0.6 cm precarinal lymph node (image 44) and 1.0 cm subcarinal lymph node. Left subclavian transvenous pacemaker. TAVR. Small hiatal hernia. No pericardial effusion. Mild generalized cardiomegaly.    CORONARY ARTERY CALCIFICATION: CABG.    UPPER ABDOMEN: Multiple rounded hypodense and hyperdense lesions in the visualized portions of the kidneys. These are not fully characterized on noncontrast CT. Cholecystectomy.    MUSCULOSKELETAL: Right shoulder arthroplasty. Degenerative changes in the spine. Median sternotomy. No suspicious lesions in the bones. Elevated left hemidiaphragm. Wedge compression deformities of several thoracic and visualized upper lumbar vertebral   bodies, likely chronic.      Impression    IMPRESSION:   1.  Patchy and groundglass opacities centered on the right upper lobe with similar mild opacities in the right lower lobe and right middle lobe, likely due to pneumonia. A follow-up CT in 8-12 weeks after treatment is recommended to ensure resolution.  2.  Small bilateral pleural effusions.  3.  Few other scattered small pulmonary nodules measuring up to 4 mm are stable since 09/28/2020.  4.  Mild right hilar lymphadenopathy and few prominent mediastinal lymph nodes, nonspecific and likely reactive.  5.  Mild generalized cardiomegaly.  6.  Multiple hypodense and hyperdense lesions in the kidneys are indeterminate on noncontrast CT. These are favored to be cysts containing hemorrhagic or proteinaceous material. Comparison with prior imaging if available can be performed or a follow-up   nonemergent adrenal MRI can be considered.

## 2022-10-09 NOTE — PLAN OF CARE
Goal Outcome Evaluation:    Plan of Care Reviewed With: patient, son   VSS.  Pt is a/o x 4.  Pt is deaf, pt and this RN used a paper and pen to communicate back and forth.  Pt on 1 L of O2 this shift, O2 sats were in the 90's.  Pt denies pain.  Lungs diminished bilaterally.  Pt steady when ambulating with SBA x 1 in the room.

## 2022-10-09 NOTE — PROGRESS NOTES
10/09/22 0915   Appointment Info   Signing Clinician's Name / Credentials (OT) Aye Host, OTR/L   Living Environment   People in Home child(pelra), adult  (sons)   Current Living Arrangements house   Home Accessibility stairs to enter home   Number of Stairs, Main Entrance 3   Transportation Anticipated family or friend will provide   Living Environment Comments Pt resides in home w/ sons. Pt lives on main floor. 1 son lives upstairs, 1 in basement. Pt has tub/shower w/ grab bars.   Self-Care   Usual Activity Tolerance good   Current Activity Tolerance moderate   Regular Exercise Yes   Activity/Exercise Type walking   Exercise Amount/Frequency daily   Equipment Currently Used at Home grab bar, tub/shower   Fall history within last six months no   Activity/Exercise/Self-Care Comment Independent w/ all ADLs/IADLs at baseline w/o device.   Instrumental Activities of Daily Living (IADL)   Previous Responsibilities meal prep;housekeeping;laundry;work;medication management;shopping   General Information   Onset of Illness/Injury or Date of Surgery 10/08/22   Referring Physician Omar Figueredo MD   Patient/Family Therapy Goal Statement (OT) Return home   Additional Occupational Profile Info/Pertinent History of Current Problem This is an 87-year-old male with history of atrial fibrillation, severe aortic stenosis, status post TAVR, post TAVR complete heart block, status post pacemaker placement, congestive heart failure, chronic kidney disease stage III, coronary artery disease, status post CABG, hypertension, hyperlipidemia, asthma and deaf.  History is obtained from the patient using the sign .  The patient presented to the ER with complaint of cough, hemoptysis and right-sided chest pain   Existing Precautions/Restrictions oxygen therapy device and L/min   Cognitive Status Examination   Orientation Status orientation to person, place and time   Sensory   Sensory Quick Adds sensation intact   Pain  Assessment   Patient Currently in Pain No   Range of Motion Comprehensive   General Range of Motion no range of motion deficits identified   Strength Comprehensive (MMT)   General Manual Muscle Testing (MMT) Assessment no strength deficits identified   Bed Mobility   Bed Mobility supine-sit   Supine-Sit [place] (Bed Mobility) supervision   Transfers   Transfers sit-stand transfer   Sit-Stand Transfer   Sit-Stand [place] (Transfers) supervision   Activities of Daily Living   BADL Assessment/Intervention toileting   Toileting   [place] Level (Toileting) supervision   Clinical Impression   Criteria for Skilled Therapeutic Interventions Met (OT) Yes, treatment indicated   OT Diagnosis Decreased activity palak for ADLs/IADLs   OT Problem List-Impairments impacting ADL problems related to;activity tolerance impaired;mobility   Assessment of Occupational Performance 1-3 Performance Deficits   Identified Performance Deficits activity palak for ADLs/IADLs   Planned Therapy Interventions (OT) ADL retraining;progressive activity/exercise;home program guidelines   Clinical Decision Making Complexity (OT) low complexity   Risk & Benefits of therapy have been explained patient   OT Total Evaluation Time   OT Eval, Low Complexity Minutes (27120) 12   OT Goals   Therapy Frequency (OT) Daily   OT Predicted Duration/Target Date for Goal Attainment 10/14/22   OT Goals Aerobic Activity;Hygiene/Grooming;Lower Body Dressing;Transfers;Toilet Transfer/Toileting   OT: Hygiene/Grooming independent;while standing   OT: Lower Body Dressing Independent   OT: Transfer Independent   OT: Toilet Transfer/Toileting Independent;toilet transfer;cleaning and garment management   OT: Perform aerobic activity with stable cardiovascular response continuous activity;10 minutes;ambulation   Interventions   Interventions Quick Adds Therapeutic Activity;Self-Care/Home Management;Therapeutic Procedures/Exercise   Self-Care/Home Management    Self-Care/Home Mgmt/ADL, Compensatory, Meal Prep Minutes (17643) 10   Treatment Detail/Skilled Intervention Pt ed on EC w/ ADLs/functional mobility tasks at home. Increased time for ed as needing to communicate via writing d/t pt being deaf. Per pt, 2 sons are available to assist as needed. Pt reports being very independent and active. Ed on gradually progressing activity for recovery, pt engaged and receptive.   Assistance (Toilet Training) supervision;verbal cues  (1 VC for safety w/ grab bar)   Therapeutic Procedures/Exercise   Therapeutic Procedure: strength, endurance, ROM, flexibillity minutes (98952) 12   Symptoms Noted During/After Treatment shortness of breath   Treatment Detail/Skilled Intervention Per MD, wanting to trial activity on RA. Pt ambulated in cliff[place] w/ SBA w/o AD to increase activity palak for ADLs. Pt ambulated for 5 mins, slight dyspnea noted. 1 standing rest period prior to negotiating stairs. Pt negotiated 4 stairs w/ SBA using bilateral railings. Pt steady on feet, no LOB noted. O2 85% on RA. Placed 1-1.5L O2 on pt, returned to >90%. RN updated   OT Discharge Planning   OT Plan activity palak w/ ADLs, clifford ambulation, bring EC handout   OT Discharge Recommendation (DC Rec) home with assist   OT Rationale for DC Rec Pt presents slightly below baseline for ADL/IADL performance after admission w/ respiratory failure. Currently SBA for ADLs, functional mobility. Limited by decreased activity palak. O2 85% on RA w/ activity during session. Will benefit from IP therapy to increase activity palak prior to returning home. Anticipate pt will be safe to discharge home w/ assist from sons as needed at discharge.   OT Brief overview of current status SBA for ADLs, functional mobility w/ stairs   Total Session Time   Timed Code Treatment Minutes 22   Total Session Time (sum of timed and untimed services) 34

## 2022-10-09 NOTE — PROGRESS NOTES
A/Ox4, VSS except HTN, on 0.5L NC, denies pain, N/V. Patient is deaf, utilized pen/paper. Was feeling was feeling SOB this AM, resolved with neb treatment. Voiding in bathroom, SBA, voiding in hallway, tolerating regular diet. PIV SL with intermittent Abx, potassium within range, AM redraw. Permanent pacemaker in left chest, discharge pending.

## 2022-10-09 NOTE — PROGRESS NOTES
.MD Notification    Notified Person: MD    Notified Person Name: Naresh Gray    Notification Date/Time:10/8/22 1933    Notification Interaction:Web paging    Purpose of Notification: Update of contaminated sputum specimen    Orders Received:Yes    Comments: Sputum culture reorderd

## 2022-10-10 ENCOUNTER — APPOINTMENT (OUTPATIENT)
Dept: OCCUPATIONAL THERAPY | Facility: CLINIC | Age: 87
DRG: 193 | End: 2022-10-10
Payer: COMMERCIAL

## 2022-10-10 VITALS
SYSTOLIC BLOOD PRESSURE: 168 MMHG | BODY MASS INDEX: 25.11 KG/M2 | DIASTOLIC BLOOD PRESSURE: 75 MMHG | RESPIRATION RATE: 16 BRPM | OXYGEN SATURATION: 91 % | WEIGHT: 165.12 LBS | TEMPERATURE: 98 F | HEART RATE: 60 BPM

## 2022-10-10 LAB
ANION GAP SERPL CALCULATED.3IONS-SCNC: 8 MMOL/L (ref 3–14)
BUN SERPL-MCNC: 54 MG/DL (ref 7–30)
CALCIUM SERPL-MCNC: 10.1 MG/DL (ref 8.5–10.1)
CHLORIDE BLD-SCNC: 104 MMOL/L (ref 94–109)
CO2 SERPL-SCNC: 25 MMOL/L (ref 20–32)
CREAT SERPL-MCNC: 1.48 MG/DL (ref 0.66–1.25)
ERYTHROCYTE [DISTWIDTH] IN BLOOD BY AUTOMATED COUNT: 18 % (ref 10–15)
GFR SERPL CREATININE-BSD FRML MDRD: 46 ML/MIN/1.73M2
GLUCOSE BLD-MCNC: 99 MG/DL (ref 70–99)
HCT VFR BLD AUTO: 32.2 % (ref 40–53)
HGB BLD-MCNC: 9.9 G/DL (ref 13.3–17.7)
M PNEUMO IGG SER IA-ACNC: 0.94 U/L
M PNEUMO IGM SER IA-ACNC: 0.45 U/L
MCH RBC QN AUTO: 20.4 PG (ref 26.5–33)
MCHC RBC AUTO-ENTMCNC: 30.7 G/DL (ref 31.5–36.5)
MCV RBC AUTO: 66 FL (ref 78–100)
PLATELET # BLD AUTO: 81 10E3/UL (ref 150–450)
POTASSIUM BLD-SCNC: 4.8 MMOL/L (ref 3.4–5.3)
RBC # BLD AUTO: 4.85 10E6/UL (ref 4.4–5.9)
SODIUM SERPL-SCNC: 137 MMOL/L (ref 133–144)
WBC # BLD AUTO: 4.5 10E3/UL (ref 4–11)

## 2022-10-10 PROCEDURE — 36415 COLL VENOUS BLD VENIPUNCTURE: CPT | Performed by: HOSPITALIST

## 2022-10-10 PROCEDURE — 94640 AIRWAY INHALATION TREATMENT: CPT

## 2022-10-10 PROCEDURE — 94640 AIRWAY INHALATION TREATMENT: CPT | Mod: 76

## 2022-10-10 PROCEDURE — 99239 HOSP IP/OBS DSCHRG MGMT >30: CPT | Performed by: HOSPITALIST

## 2022-10-10 PROCEDURE — 250N000009 HC RX 250: Performed by: INTERNAL MEDICINE

## 2022-10-10 PROCEDURE — 250N000013 HC RX MED GY IP 250 OP 250 PS 637: Performed by: INTERNAL MEDICINE

## 2022-10-10 PROCEDURE — 250N000012 HC RX MED GY IP 250 OP 636 PS 637: Performed by: HOSPITALIST

## 2022-10-10 PROCEDURE — 85027 COMPLETE CBC AUTOMATED: CPT | Performed by: HOSPITALIST

## 2022-10-10 PROCEDURE — 999N000157 HC STATISTIC RCP TIME EA 10 MIN

## 2022-10-10 PROCEDURE — 250N000013 HC RX MED GY IP 250 OP 250 PS 637: Performed by: HOSPITALIST

## 2022-10-10 PROCEDURE — 97535 SELF CARE MNGMENT TRAINING: CPT | Mod: GO

## 2022-10-10 PROCEDURE — 82310 ASSAY OF CALCIUM: CPT | Performed by: HOSPITALIST

## 2022-10-10 RX ORDER — DOXYCYCLINE 100 MG/1
100 CAPSULE ORAL 2 TIMES DAILY
Qty: 10 CAPSULE | Refills: 0 | Status: SHIPPED | OUTPATIENT
Start: 2022-10-10 | End: 2022-10-15

## 2022-10-10 RX ORDER — FERROUS SULFATE 325(65) MG
325 TABLET ORAL 2 TIMES DAILY
Qty: 120 TABLET | Refills: 0 | Status: SHIPPED | OUTPATIENT
Start: 2022-10-10 | End: 2022-12-09

## 2022-10-10 RX ORDER — CEPHALEXIN 500 MG/1
500 CAPSULE ORAL ONCE
Status: COMPLETED | OUTPATIENT
Start: 2022-10-10 | End: 2022-10-10

## 2022-10-10 RX ORDER — PREDNISONE 20 MG/1
40 TABLET ORAL DAILY
Qty: 6 TABLET | Refills: 0 | Status: SHIPPED | OUTPATIENT
Start: 2022-10-10 | End: 2022-10-13

## 2022-10-10 RX ORDER — DOXYCYCLINE 100 MG/1
100 CAPSULE ORAL ONCE
Status: COMPLETED | OUTPATIENT
Start: 2022-10-10 | End: 2022-10-10

## 2022-10-10 RX ORDER — PREDNISONE 20 MG/1
40 TABLET ORAL ONCE
Status: COMPLETED | OUTPATIENT
Start: 2022-10-10 | End: 2022-10-10

## 2022-10-10 RX ORDER — CEPHALEXIN 500 MG/1
500 CAPSULE ORAL 2 TIMES DAILY
Qty: 10 CAPSULE | Refills: 0 | Status: SHIPPED | OUTPATIENT
Start: 2022-10-10 | End: 2022-10-15

## 2022-10-10 RX ADMIN — FERROUS SULFATE TAB 325 MG (65 MG ELEMENTAL FE) 325 MG: 325 (65 FE) TAB at 09:46

## 2022-10-10 RX ADMIN — DABIGATRAN ETEXILATE MESYLATE 150 MG: 150 CAPSULE ORAL at 09:46

## 2022-10-10 RX ADMIN — HYDRALAZINE HYDROCHLORIDE 12.5 MG: 25 TABLET ORAL at 14:46

## 2022-10-10 RX ADMIN — TORSEMIDE 10 MG: 10 TABLET ORAL at 09:45

## 2022-10-10 RX ADMIN — ISOSORBIDE MONONITRATE 30 MG: 30 TABLET, EXTENDED RELEASE ORAL at 09:44

## 2022-10-10 RX ADMIN — DOXYCYCLINE HYCLATE 100 MG: 100 CAPSULE ORAL at 12:05

## 2022-10-10 RX ADMIN — PREDNISONE 40 MG: 20 TABLET ORAL at 12:05

## 2022-10-10 RX ADMIN — HYDRALAZINE HYDROCHLORIDE 12.5 MG: 25 TABLET ORAL at 09:45

## 2022-10-10 RX ADMIN — ASPIRIN 81 MG: 81 TABLET, COATED ORAL at 09:46

## 2022-10-10 RX ADMIN — BUDESONIDE 0.5 MG: 0.5 INHALANT RESPIRATORY (INHALATION) at 07:16

## 2022-10-10 RX ADMIN — PANTOPRAZOLE SODIUM 40 MG: 40 TABLET, DELAYED RELEASE ORAL at 07:06

## 2022-10-10 RX ADMIN — CEPHALEXIN 500 MG: 500 CAPSULE ORAL at 12:05

## 2022-10-10 RX ADMIN — METOPROLOL TARTRATE 25 MG: 25 TABLET, FILM COATED ORAL at 09:45

## 2022-10-10 RX ADMIN — IPRATROPIUM BROMIDE AND ALBUTEROL SULFATE 3 ML: .5; 3 SOLUTION RESPIRATORY (INHALATION) at 07:16

## 2022-10-10 RX ADMIN — IPRATROPIUM BROMIDE AND ALBUTEROL SULFATE 3 ML: .5; 3 SOLUTION RESPIRATORY (INHALATION) at 11:00

## 2022-10-10 ASSESSMENT — ACTIVITIES OF DAILY LIVING (ADL)
ADLS_ACUITY_SCORE: 39

## 2022-10-10 NOTE — PLAN OF CARE
Goal Outcome Evaluation:    Pt is A&Ox4, pt is deaf and communicates via verbal&written communication ,SBA, VSS on RA, tolerating reg diet, denies pain and appears comfortable, diminished LS, on scheduled nebs and IV abx, voiding adequately in the BR,discharge pending.

## 2022-10-10 NOTE — PLAN OF CARE
Occupational Therapy Discharge Summary    Reason for therapy discharge:    All goals and outcomes met, no further needs identified.    Progress towards therapy goal(s). See goals on Care Plan in [facility] electronic health record for goal details.  Goals met    Therapy recommendation(s):    Assist in I/ADLs as needed.

## 2022-10-10 NOTE — PROGRESS NOTES
Patient discharged from gen surg unit with NA via wc.  Orientation:x4  Respirations status: WNL  Ambulation status: Ind  Pain status: Denies  VS: WNL, BP little elevated. Received scheduled hydralazine  PIV: Removed and dressed  Discharge outcome: After visit summary provided and explained, patient verbalized understanding. Left the floor with all of his belongings and medications.

## 2022-10-10 NOTE — DISCHARGE SUMMARY
[facility]  Hospitalist Discharge Summary      Date of Admission:  10/8/2022  Date of Discharge:  10/10/2022  Discharging Provider: Nnamdi Flores DO  Discharge Service: Hospitalist Service    Discharge Diagnoses   Acute hypoxic respiratory failure from community acquired pneumonia  Hemoptysis suspect due to pneumonia  Microcytic anemia  Thrombocytopenia    Follow-ups Needed After Discharge   Follow-up with PCP with CBC in 1wk    Unresulted Labs Ordered in the Past 30 Days of this Admission     Date and Time Order Name Status Description    10/8/2022  7:42 AM Blood Culture Peripheral Blood Preliminary     10/8/2022  7:42 AM Blood Culture Peripheral Blood Preliminary       These results will be followed up by PCP    Discharge Disposition   Discharged to home  Condition at discharge: Stable      Hospital Course   Acute hypoxic respiratory failure from community acquired pneumonia  *P/w with cough, right-sided chest pain, and hemoptysis  * Chest x-ray shows right upper lobe consolidation.    * CT scan of the chest done in the ED did show patchy and ground-glass opacities seen on the right upper lobe with similar mild opacity in the right lower lobe and right middle lobe, likely secondary to pneumonia.    IgG to mycoplasma positive, of unclear significance, could be prev infection  Plan  - off oxygen for almost 24 hrs prior to discharge  - keflex and doxy on discharge  - increase activities as tolerates, avoid wood dust  - steroid course  - follow-up with CT scan in 8-12 weeks to ensure resolution     Hemoptysis suspect due to pneumonia  Microcytic anemia  hgb drop from 11's to 9.6, unclear if due to hemoptysis, dilutional versus orther  Plan  - no further bleeding noted     Thrombocytopenia  Plan  - still low platelets, but no evidence of bleeding. 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  He is on Singulair, Pulmicort nebulization, DuoNeb nebulization and will continue with that.       Aortic stenosis, status post TAVR:  Has been stable at this time.     Gastroesophageal reflux disease  Plan  - continue ppi     Chronic kidney disease stage III: Baseline creatinine anywhere from 1.6 to 1.8.   Plan  - stable     Lower extremity edema:  This is multifactorial secondary to chronic kidney disease, CHF.    Plan  - continue torsemide     Coronary artery disease:  Has been stable.  Troponin negative.  EKG not ischemic. No central chest pain at this point.  Continue his medications at home, Imdur, metoprolol, Zocor at this time.       Renal lesions  Likely cysts containing hemorrhagic or proteinaceous material  Plan  - consider nonemergent adrenal MRI, discussed with  and patient     Consultations This Hospital Stay   CARE MANAGEMENT / SOCIAL WORK IP CONSULT  PHYSICAL THERAPY ADULT IP CONSULT  OCCUPATIONAL THERAPY ADULT IP CONSULT    Code Status   Full Code    Time Spent on this Encounter   INnamdi DO, personally saw the patient today and spent greater than 30 minutes discharging this patient.       Nnamdi Flores DO  [address] [address]  Phone: [phone]  Fax: [phone]  ______________________________________________________________________    Physical Exam   Vital Signs: Temp: 97.5  F (36.4  C) Temp src: Oral BP: (!) 156/70 Pulse: 55   Resp: 16 SpO2: 91 % O2 Device: None (Room air) Oxygen Delivery: 1 LPM  Weight: 165 lbs 1.99 oz    Constitutional: Awake, alert, cooperative, no apparent distress.  Respiratory: Clear to auscultation  on left, right sided Crackles posteriorly and upper lung, with occasional  wheeze.  Cardiovascular: Irregularly irregular rhythm, normal S1 and S2, and no murmur noted.  GI: Soft, non-distended, non-tender, normal bowel sounds.  Primary Care Physician   Dany Rodriguez MD    Discharge Orders      Reason for your hospital stay    pneumonia     Follow-up and recommended labs and tests     Follow up with primary care provider, Dany Rodriguez MD, within 7 days to evaluate medication change and for hospital follow- up.  The following labs/tests are recommended: cbc to eval the platelets.     Activity    Your activity upon discharge: activity as tolerated     Discharge Instructions    Please get labs to recheck your platelets in about 1-2 weeks  Monitor for bleeding or new rashes that dont va, especially on the legs. Let your doctor know if you develop this     Diet    Follow this diet upon discharge: Orders Placed This Encounter      Room Service      Combination Diet Regular Diet Adult       Significant Results and Procedures   Most Recent 3 CBC's:  Recent Labs   Lab Test 10/10/22  0718 10/09/22  1703 10/09/22  0634 10/08/22  0756   WBC 4.5  --  4.6 5.7   HGB 9.9* 9.7* 9.6* 11.2*   MCV 66*  --  68* 68*   PLT 81*  --  91* 115*     Most Recent 3 BMP's:  Recent Labs   Lab Test 10/10/22  0718 10/09/22  0634 10/08/22  0756    137 141   POTASSIUM 4.8 4.7 4.3   CHLORIDE 104 106 105   CO2 25 24 27   BUN 54* 54* 51*   CR 1.48* 1.65* 1.79*   ANIONGAP 8 7 9   AYALA 10.1 9.1 9.1   GLC 99 102* 122*     Most Recent 2 LFT's:  Recent Labs   Lab Test 10/08/22  0756 02/23/22  1411   AST 31 16   ALT 38 22   ALKPHOS 86 72   BILITOTAL 0.7 0.7   ,   Results for orders placed or performed during the hospital encounter of 10/08/22   XR Chest 2 Views    Narrative    EXAM: XR CHEST 2 VIEWS  LOCATION: [facility]  DATE/TIME: 10/8/2022 8:20 AM    INDICATION: cough with hemoptysis,  hypoxia  COMPARISON: 09/02/2021      Impression    IMPRESSION: New large rounded masslike area of consolidation in the right upper lobe measuring 11 x 7 cm, could represent infection or malignancy. Recommend chest CT for further characterization. Stable elevated left hemidiaphragm. No significant pleural   effusion or pneumothorax. Pacemaker. Median sternotomy. TAVR.   Chest CT w/o contrast    Narrative    EXAM: CT CHEST W/O CONTRAST  LOCATION: [facility]  DATE/TIME: 10/8/2022 9:44 AM    INDICATION: Hypoxia, hemoptysis, mass noted on chest x ray, define further with CT scan  COMPARISON: Chest x-ray earlier today and chest CT 09/28/2020  TECHNIQUE: CT chest without IV contrast. Multiplanar reformats were obtained. Dose reduction techniques were used.  CONTRAST: None.    FINDINGS:   LUNGS AND PLEURA: Focal area of dense consolidative opacities with surrounding groundglass opacities centered on the right upper lobe with similar milder opacities in the superior segment of the right lower lobe and the right middle lobe. Off note,   patchy and nodular opacities were also present in the right upper lobe in 2020. Small bilateral pleural effusions. Few scattered small pulmonary nodules, several of which are stable. For example, there is a stable 4 mm right lower lobe nodule (series 4,   image 196), 3 mm left upper lobe nodule (image 100) and 3 mm left upper lobe nodule (image 69)..    MEDIASTINUM/AXILLAE: Mild right hilar lymphadenopathy. For example, there is a 1.3 cm right hilar lymph node (series 3, image 66). Few prominent mediastinal lymph nodes. For example, there is a 0.8 cm right paraesophageal lymph node (series 3, image 25),   0.6 cm precarinal lymph node (image 44) and 1.0 cm subcarinal lymph node. Left subclavian transvenous pacemaker. TAVR. Small hiatal hernia. No pericardial effusion. Mild generalized cardiomegaly.    CORONARY ARTERY CALCIFICATION: CABG.    UPPER ABDOMEN: Multiple  rounded hypodense and hyperdense lesions in the visualized portions of the kidneys. These are not fully characterized on noncontrast CT. Cholecystectomy.    MUSCULOSKELETAL: Right shoulder arthroplasty. Degenerative changes in the spine. Median sternotomy. No suspicious lesions in the bones. Elevated left hemidiaphragm. Wedge compression deformities of several thoracic and visualized upper lumbar vertebral   bodies, likely chronic.      Impression    IMPRESSION:   1.  Patchy and groundglass opacities centered on the right upper lobe with similar mild opacities in the right lower lobe and right middle lobe, likely due to pneumonia. A follow-up CT in 8-12 weeks after treatment is recommended to ensure resolution.  2.  Small bilateral pleural effusions.  3.  Few other scattered small pulmonary nodules measuring up to 4 mm are stable since 09/28/2020.  4.  Mild right hilar lymphadenopathy and few prominent mediastinal lymph nodes, nonspecific and likely reactive.  5.  Mild generalized cardiomegaly.  6.  Multiple hypodense and hyperdense lesions in the kidneys are indeterminate on noncontrast CT. These are favored to be cysts containing hemorrhagic or proteinaceous material. Comparison with prior imaging if available can be performed or a follow-up   nonemergent adrenal MRI can be considered.       Discharge Medications   Current Discharge Medication List      START taking these medications    Details   cephALEXin (KEFLEX) 500 MG capsule Take 1 capsule (500 mg) by mouth 2 times daily for 5 days  Qty: 10 capsule, Refills: 0    Associated Diagnoses: Pneumonia due to infectious organism, unspecified laterality, unspecified part of lung      doxycycline hyclate (VIBRAMYCIN) 100 MG capsule Take 1 capsule (100 mg) by mouth 2 times daily for 5 days  Qty: 10 capsule, Refills: 0    Associated Diagnoses: Pneumonia due to infectious organism, unspecified laterality, unspecified part of lung      ferrous sulfate (FEROSUL) 325 (65  Fe) MG tablet Take 1 tablet (325 mg) by mouth 2 times daily for 60 days  Qty: 120 tablet, Refills: 0    Associated Diagnoses: Pneumonia due to infectious organism, unspecified laterality, unspecified part of lung      predniSONE (DELTASONE) 20 MG tablet Take 2 tablets (40 mg) by mouth daily for 3 days  Qty: 6 tablet, Refills: 0    Associated Diagnoses: Pneumonia due to infectious organism, unspecified laterality, unspecified part of lung         CONTINUE these medications which have NOT CHANGED    Details   acetaminophen (TYLENOL) 500 MG tablet Take 1,000 mg by mouth every 8 hours as needed      albuterol (PROAIR HFA/PROVENTIL HFA/VENTOLIN HFA) 108 (90 Base) MCG/ACT inhaler INHALE 1 TO 2 PUFFS BY MOUTH EVERY 4 TO 6 HOURS AS NEEDED  Qty: 18 g, Refills: 2    Comments: Pharmacy may dispense brand covered by insurance (Proair, or proventil or ventolin or generic albuterol inhaler)  Associated Diagnoses: Moderate persistent asthma without complication      aspirin (ASA) 81 MG EC tablet Take 81 mg by mouth daily      budesonide (PULMICORT) 0.5 MG/2ML nebulizer solution Take 2 mLs (0.5 mg) by nebulization 2 times daily  Qty: 120 mL, Refills: 0    Associated Diagnoses: Moderate persistent asthma      calcium carbonate 600 mg-vitamin D 400 units (CALTRATE) 600-400 MG-UNIT per tablet Take 1 tablet by mouth daily      dabigatran ANTICOAGULANT (PRADAXA) 150 MG capsule Take 1 capsule (150 mg) by mouth 2 times daily  Qty: 180 capsule, Refills: 3    Associated Diagnoses: Coronary artery disease involving native coronary artery of native heart without angina pectoris      hydrALAZINE (APRESOLINE) 25 MG tablet Take 0.5 tablets (12.5 mg) by mouth 3 times daily  Qty: 135 tablet, Refills: 3    Associated Diagnoses: Acute on chronic congestive heart failure, unspecified heart failure type (H)      ipratropium - albuterol 0.5 mg/2.5 mg/3 mL (DUONEB) 0.5-2.5 (3) MG/3ML nebulization Inhale 1 vial (3 mLs) into the lungs 4 times daily  Qty:  360 mL, Refills: 11    Associated Diagnoses: Moderate persistent asthma, uncomplicated      isosorbide mononitrate (IMDUR) 30 MG 24 hr tablet Take 1 tablet (30 mg) by mouth daily  Qty: 90 tablet, Refills: 3    Associated Diagnoses: Acute on chronic congestive heart failure, unspecified heart failure type (H)      magnesium chloride (MAG64) 535 (64 Mg) MG TBEC CR tablet Take 1 tablet (535 mg) by mouth 2 times daily  Qty: 180 tablet, Refills: 3    Associated Diagnoses: Acute diastolic heart failure (H)      melatonin 3 MG tablet Take 1 tablet (3 mg) by mouth nightly as needed for sleep  Qty: 30 tablet, Refills: 0    Associated Diagnoses: Insomnia, unspecified type      metoprolol tartrate (LOPRESSOR) 25 MG tablet TAKE 1 TABLET BY MOUTH TWICE A DAY  Qty: 180 tablet, Refills: 1    Comments: DX Code Needed  PT REQUESTS REFILL.  PLS ISSUE NEW SCRIPT TO PERMIT REFILLS.  THANKS!.  Associated Diagnoses: Hypertension goal BP (blood pressure) < 140/90      montelukast (SINGULAIR) 10 MG tablet Take 1 tablet (10 mg) by mouth daily  Qty: 90 tablet, Refills: 1    Associated Diagnoses: Moderate persistent asthma without complication      multivitamin, therapeutic (THERA-VIT) TABS tablet Take 1 tablet by mouth 2 times daily      nitroGLYcerin (NITROSTAT) 0.4 MG sublingual tablet For chest pain place 1 tablet under the tongue every 5 minutes for 3 doses. If symptoms persist 5 minutes after 1st dose call 911.  Qty: 12 tablet, Refills: 0    Comments: Future refills by PCP Dr. Dany Rodriguez MD with phone number [phone].  Associated Diagnoses: Severe aortic stenosis; Complete heart block (H); Permanent atrial fibrillation (H); Thrombus of left atrial appendage      pantoprazole (PROTONIX) 40 MG EC tablet TAKE 1 TABLET BEFORE MEALS TWICE A DAY  Qty: 180 tablet, Refills: 1    Comments: DX Code Needed  PT REQUESTS REFILL.  PLS ISSUE NEW SCRIPT TO PERMIT REFILLS.  THANKS!.  Associated Diagnoses: Ulcer of esophagus with  bleeding      simvastatin (ZOCOR) 40 MG tablet Take 1 tablet (40 mg) by mouth At Bedtime  Qty: 90 tablet, Refills: 3    Associated Diagnoses: Hyperlipidemia with target LDL less than 100      torsemide (DEMADEX) 5 MG tablet Patient taking 10 mg in the morning. Take additional 5mg if weight gain of 2-3 lbs in 1 day.  Qty: 270 tablet, Refills: 1    Associated Diagnoses: Acute diastolic heart failure (H); S/P TAVR (transcatheter aortic valve replacement); Hyperlipidemia with target LDL less than 100         STOP taking these medications       ferrous sulfate (FE TABS) 325 (65 Fe) MG EC tablet Comments:   Reason for Stopping:             Allergies   No Known Allergies

## 2022-10-10 NOTE — PROGRESS NOTES
Pt is A&OX4, VSS except HTN. On 0.5 L via NC. Pt denies pain, n/v. Pt is a SBA and ambulated the hallway.  Pt is deaf, and uses a pen/paper while communicating but also does verbalize some words. Pt has a permanent pacemake on left side of chest. Pt is tolerating regular diet well. Will continue to monitor.

## 2022-10-12 ENCOUNTER — PATIENT OUTREACH (OUTPATIENT)
Dept: CARE COORDINATION | Facility: CLINIC | Age: 87
End: 2022-10-12

## 2022-10-12 NOTE — PROGRESS NOTES
Clinic Care Coordination Contact  RUST/Voicemail       Clinical Data: Care Coordinator Outreach  Outreach attempted x 2.  Left message on patient's voicemail with call back information and requested return call.  Plan:  Care Coordinator will do no further outreaches at this time.            CIARAN Madrigal  [phone]  [facility]

## 2022-10-13 LAB
BACTERIA BLD CULT: NO GROWTH
BACTERIA BLD CULT: NO GROWTH

## 2022-10-31 ENCOUNTER — OFFICE VISIT (OUTPATIENT)
Dept: FAMILY MEDICINE | Facility: CLINIC | Age: 87
End: 2022-10-31
Payer: COMMERCIAL

## 2022-10-31 VITALS
OXYGEN SATURATION: 94 % | WEIGHT: 158 LBS | RESPIRATION RATE: 18 BRPM | TEMPERATURE: 97.9 F | BODY MASS INDEX: 24.02 KG/M2 | HEART RATE: 56 BPM | SYSTOLIC BLOOD PRESSURE: 160 MMHG | DIASTOLIC BLOOD PRESSURE: 70 MMHG

## 2022-10-31 DIAGNOSIS — I50.31 ACUTE DIASTOLIC HEART FAILURE (H): ICD-10-CM

## 2022-10-31 DIAGNOSIS — D64.9 ACUTE ANEMIA: ICD-10-CM

## 2022-10-31 DIAGNOSIS — J18.9 PNEUMONIA DUE TO INFECTIOUS ORGANISM, UNSPECIFIED LATERALITY, UNSPECIFIED PART OF LUNG: Primary | ICD-10-CM

## 2022-10-31 DIAGNOSIS — I10 HYPERTENSION GOAL BP (BLOOD PRESSURE) < 140/90: ICD-10-CM

## 2022-10-31 DIAGNOSIS — D69.6 THROMBOCYTOPENIA, UNSPECIFIED (H): ICD-10-CM

## 2022-10-31 DIAGNOSIS — D72.9 ABNORMAL WBC COUNT: ICD-10-CM

## 2022-10-31 LAB
ANION GAP SERPL CALCULATED.3IONS-SCNC: 9 MMOL/L (ref 3–14)
BUN SERPL-MCNC: 38 MG/DL (ref 7–30)
CALCIUM SERPL-MCNC: 9.4 MG/DL (ref 8.5–10.1)
CHLORIDE BLD-SCNC: 110 MMOL/L (ref 94–109)
CO2 SERPL-SCNC: 23 MMOL/L (ref 20–32)
CREAT SERPL-MCNC: 1.52 MG/DL (ref 0.66–1.25)
ERYTHROCYTE [DISTWIDTH] IN BLOOD BY AUTOMATED COUNT: 19.6 % (ref 10–15)
GFR SERPL CREATININE-BSD FRML MDRD: 44 ML/MIN/1.73M2
GLUCOSE BLD-MCNC: 105 MG/DL (ref 70–99)
HCT VFR BLD AUTO: 34.3 % (ref 40–53)
HGB BLD-MCNC: 10.8 G/DL (ref 13.3–17.7)
MCH RBC QN AUTO: 20.6 PG (ref 26.5–33)
MCHC RBC AUTO-ENTMCNC: 31.5 G/DL (ref 31.5–36.5)
MCV RBC AUTO: 66 FL (ref 78–100)
PLATELET # BLD AUTO: 156 10E3/UL (ref 150–450)
POTASSIUM BLD-SCNC: 4.8 MMOL/L (ref 3.4–5.3)
RBC # BLD AUTO: 5.24 10E6/UL (ref 4.4–5.9)
SODIUM SERPL-SCNC: 142 MMOL/L (ref 133–144)
WBC # BLD AUTO: 2.8 10E3/UL (ref 4–11)

## 2022-10-31 PROCEDURE — 85027 COMPLETE CBC AUTOMATED: CPT | Performed by: FAMILY MEDICINE

## 2022-10-31 PROCEDURE — 99215 OFFICE O/P EST HI 40 MIN: CPT | Performed by: FAMILY MEDICINE

## 2022-10-31 PROCEDURE — 80048 BASIC METABOLIC PNL TOTAL CA: CPT | Performed by: FAMILY MEDICINE

## 2022-10-31 PROCEDURE — 36415 COLL VENOUS BLD VENIPUNCTURE: CPT | Performed by: FAMILY MEDICINE

## 2022-10-31 ASSESSMENT — ASTHMA QUESTIONNAIRES
QUESTION_2 LAST FOUR WEEKS HOW OFTEN HAVE YOU HAD SHORTNESS OF BREATH: ONCE A DAY
ACT_TOTALSCORE: 21
ACT_TOTALSCORE: 21
QUESTION_3 LAST FOUR WEEKS HOW OFTEN DID YOUR ASTHMA SYMPTOMS (WHEEZING, COUGHING, SHORTNESS OF BREATH, CHEST TIGHTNESS OR PAIN) WAKE YOU UP AT NIGHT OR EARLIER THAN USUAL IN THE MORNING: NOT AT ALL
QUESTION_5 LAST FOUR WEEKS HOW WOULD YOU RATE YOUR ASTHMA CONTROL: WELL CONTROLLED
HOSPITALIZATION_OVERNIGHT_LAST_YEAR_TOTAL: ONE
EMERGENCY_ROOM_LAST_YEAR_TOTAL: ONE
QUESTION_1 LAST FOUR WEEKS HOW MUCH OF THE TIME DID YOUR ASTHMA KEEP YOU FROM GETTING AS MUCH DONE AT WORK, SCHOOL OR AT HOME: NONE OF THE TIME
QUESTION_4 LAST FOUR WEEKS HOW OFTEN HAVE YOU USED YOUR RESCUE INHALER OR NEBULIZER MEDICATION (SUCH AS ALBUTEROL): NOT AT ALL

## 2022-10-31 NOTE — LETTER
November 1, 2022      Shiraz Ingram  [address]  [facility] [address]        Dear ,    We are writing to inform you of your test results.    Hemoglobin is improving which is reassuring.  It is still on lower side.  Platelet counts are back to normal.  Your white count is abnormal and it is on lower side.  I would recommend you to come back for lab appointment in another month to have it rechecked.  I have ordered a blood test for you.     Kidney function is not the best but it is stable.     Resulted Orders   Basic metabolic panel  (Ca, Cl, CO2, Creat, Gluc, K, Na, BUN)   Result Value Ref Range    Sodium 142 133 - 144 mmol/L    Potassium 4.8 3.4 - 5.3 mmol/L    Chloride 110 (H) 94 - 109 mmol/L    Carbon Dioxide (CO2) 23 20 - 32 mmol/L    Anion Gap 9 3 - 14 mmol/L    Urea Nitrogen 38 (H) 7 - 30 mg/dL    Creatinine 1.52 (H) 0.66 - 1.25 mg/dL    Calcium 9.4 8.5 - 10.1 mg/dL    Glucose 105 (H) 70 - 99 mg/dL    GFR Estimate 44 (L) >60 mL/min/1.73m2      Comment:      Effective December 21, 2021 eGFRcr in adults is calculated using the 2021 CKD-EPI creatinine equation which includes age and gender (Shahnaz et al., NEJ, DOI: 10.1056/GSKCyf6446448)   CBC with platelets   Result Value Ref Range    WBC Count 2.8 (L) 4.0 - 11.0 10e3/uL    RBC Count 5.24 4.40 - 5.90 10e6/uL    Hemoglobin 10.8 (L) 13.3 - 17.7 g/dL    Hematocrit 34.3 (L) 40.0 - 53.0 %    MCV 66 (L) 78 - 100 fL    MCH 20.6 (L) 26.5 - 33.0 pg    MCHC 31.5 31.5 - 36.5 g/dL    RDW 19.6 (H) 10.0 - 15.0 %    Platelet Count 156 150 - 450 10e3/uL       If you have any questions or concerns, please call the clinic at the number listed above.       Sincerely,      Dany Rodriguez MD

## 2022-10-31 NOTE — PROGRESS NOTES
Assessment & Plan     Pneumonia due to infectious organism, unspecified laterality, unspecified part of lung  Reviewed recent hospitalization.  Completed antibiotics and feeling fine.    Acute anemia  Had hemoptysis and history of GI bleed.  Hemoglobin was stable during hospitalization.  We will recheck it today.  - CBC with platelets; Future  - Basic metabolic panel  (Ca, Cl, CO2, Creat, Gluc, K, Na, BUN); Future  - Basic metabolic panel  (Ca, Cl, CO2, Creat, Gluc, K, Na, BUN)  - CBC with platelets    Acute diastolic heart failure (H)  Feels more at baseline.  Still some fluid overload.  We discussed as per previous cardiology recommendation go up on the dose of torsemide.  Will take 15 mg/day.    Hypertension goal BP (blood pressure) < 140/90  Somewhat on higher side today.  We will go up on the dose of torsemide for above and might help with blood pressure too.  Monitor hypotension due to his advanced age.    Thrombocytopenia, unspecified (H)  Unclear etiology but it was resolved today's blood work.        >50 minutes spent on the date of the encounter doing chart review, history and exam, documentation and further activities per the note      MED REC REQUIRED   Post Medication Reconciliation Status:  Discharge medications reconciled, continue medications without change         No follow-ups on file.    Dany Rodriguez MD, MD  [facility]    Javon Weldon is a 87 year old, presenting for the following health issues:  Hospital F/U      History of Present Illness     Asthma:  He presents for follow up of asthma.  He has no cough, no wheezing, and some shortness of breath. He is using a relief medication a few times a month. He does not miss any doses of his controller medication throughout the week.Patient is aware of the following triggers: unaware of any triggers. The patient has had a visit to the Emergency Room, Urgent Care or Hospital due to asthma since the last  clinic visit. He has been to the Emergency Room or Urgent Care 1 time.He has had a Hospitalization 1 time.    He eats 2-3 servings of fruits and vegetables daily.He consumes 1 sweetened beverage(s) daily.He exercises with enough effort to increase his heart rate 30 to 60 minutes per day.  He exercises with enough effort to increase his heart rate 7 days per week.   He is taking medications regularly.     Was hospitalized from 10/8 to 10/10 with pneumonia, microcytic anemia and thrombocytopenia.     Feeling much better.      no need to use oxygen day or night  Right leg>left leg swelling  Oxygen for 3 weeks - sore throat.     Brought his home bp machine.  Breathing is almost back to baseline.   Right leg >left leg.   Taking 10mg of torsemide. Saw cardiology in June.   Was advised that they will contact him for follow up. Home machine shows 150/80 bp in the clinic.     Review of Systems         Objective    BP (!) 171/74 (BP Location: Right arm, Patient Position: Sitting, Cuff Size: Adult Regular)   Pulse 56   Temp 97.9  F (36.6  C) (Temporal)   Resp 18   Wt 71.7 kg (158 lb)   SpO2 94%   BMI 24.02 kg/m    Body mass index is 24.02 kg/m .  Physical Exam   Bilateral pitting edema R>L.

## 2022-11-04 ENCOUNTER — ANCILLARY PROCEDURE (OUTPATIENT)
Dept: CARDIOLOGY | Facility: CLINIC | Age: 87
End: 2022-11-04
Attending: INTERNAL MEDICINE
Payer: COMMERCIAL

## 2022-11-04 DIAGNOSIS — I49.5 SSS (SICK SINUS SYNDROME) (H): ICD-10-CM

## 2022-11-04 DIAGNOSIS — Z95.0 CARDIAC PACEMAKER IN SITU: ICD-10-CM

## 2022-11-04 LAB
MDC_IDC_EPISODE_DTM: NORMAL
MDC_IDC_EPISODE_DURATION: 1 S
MDC_IDC_EPISODE_ID: 4
MDC_IDC_EPISODE_TYPE: NORMAL
MDC_IDC_LEAD_IMPLANT_DT: NORMAL
MDC_IDC_LEAD_LOCATION: NORMAL
MDC_IDC_LEAD_LOCATION_DETAIL_1: NORMAL
MDC_IDC_LEAD_MFG: NORMAL
MDC_IDC_LEAD_MODEL: NORMAL
MDC_IDC_LEAD_POLARITY_TYPE: NORMAL
MDC_IDC_LEAD_SERIAL: NORMAL
MDC_IDC_MSMT_BATTERY_DTM: NORMAL
MDC_IDC_MSMT_BATTERY_REMAINING_LONGEVITY: 157 MO
MDC_IDC_MSMT_BATTERY_RRT_TRIGGER: 2.62
MDC_IDC_MSMT_BATTERY_STATUS: NORMAL
MDC_IDC_MSMT_BATTERY_VOLTAGE: 3.05 V
MDC_IDC_MSMT_LEADCHNL_RV_IMPEDANCE_VALUE: 304 OHM
MDC_IDC_MSMT_LEADCHNL_RV_IMPEDANCE_VALUE: 437 OHM
MDC_IDC_MSMT_LEADCHNL_RV_PACING_THRESHOLD_AMPLITUDE: 1 V
MDC_IDC_MSMT_LEADCHNL_RV_PACING_THRESHOLD_PULSEWIDTH: 0.4 MS
MDC_IDC_MSMT_LEADCHNL_RV_SENSING_INTR_AMPL: 7.75 MV
MDC_IDC_MSMT_LEADCHNL_RV_SENSING_INTR_AMPL: 7.75 MV
MDC_IDC_PG_IMPLANT_DTM: NORMAL
MDC_IDC_PG_MFG: NORMAL
MDC_IDC_PG_MODEL: NORMAL
MDC_IDC_PG_SERIAL: NORMAL
MDC_IDC_PG_TYPE: NORMAL
MDC_IDC_SESS_CLINIC_NAME: NORMAL
MDC_IDC_SESS_DTM: NORMAL
MDC_IDC_SESS_TYPE: NORMAL
MDC_IDC_SET_BRADY_HYSTRATE: NORMAL
MDC_IDC_SET_BRADY_LOWRATE: 50 {BEATS}/MIN
MDC_IDC_SET_BRADY_MODE: NORMAL
MDC_IDC_SET_LEADCHNL_RV_PACING_AMPLITUDE: 2 V
MDC_IDC_SET_LEADCHNL_RV_PACING_ANODE_ELECTRODE_1: NORMAL
MDC_IDC_SET_LEADCHNL_RV_PACING_ANODE_LOCATION_1: NORMAL
MDC_IDC_SET_LEADCHNL_RV_PACING_CAPTURE_MODE: NORMAL
MDC_IDC_SET_LEADCHNL_RV_PACING_CATHODE_ELECTRODE_1: NORMAL
MDC_IDC_SET_LEADCHNL_RV_PACING_CATHODE_LOCATION_1: NORMAL
MDC_IDC_SET_LEADCHNL_RV_PACING_POLARITY: NORMAL
MDC_IDC_SET_LEADCHNL_RV_PACING_PULSEWIDTH: 0.4 MS
MDC_IDC_SET_LEADCHNL_RV_SENSING_ANODE_ELECTRODE_1: NORMAL
MDC_IDC_SET_LEADCHNL_RV_SENSING_ANODE_LOCATION_1: NORMAL
MDC_IDC_SET_LEADCHNL_RV_SENSING_CATHODE_ELECTRODE_1: NORMAL
MDC_IDC_SET_LEADCHNL_RV_SENSING_CATHODE_LOCATION_1: NORMAL
MDC_IDC_SET_LEADCHNL_RV_SENSING_POLARITY: NORMAL
MDC_IDC_SET_LEADCHNL_RV_SENSING_SENSITIVITY: 0.9 MV
MDC_IDC_SET_ZONE_DETECTION_INTERVAL: 360 MS
MDC_IDC_SET_ZONE_TYPE: NORMAL
MDC_IDC_STAT_BRADY_DTM_END: NORMAL
MDC_IDC_STAT_BRADY_DTM_START: NORMAL
MDC_IDC_STAT_BRADY_RV_PERCENT_PACED: 47.4 %
MDC_IDC_STAT_EPISODE_RECENT_COUNT: 0
MDC_IDC_STAT_EPISODE_RECENT_COUNT: 0
MDC_IDC_STAT_EPISODE_RECENT_COUNT: 1
MDC_IDC_STAT_EPISODE_RECENT_COUNT_DTM_END: NORMAL
MDC_IDC_STAT_EPISODE_RECENT_COUNT_DTM_START: NORMAL
MDC_IDC_STAT_EPISODE_TOTAL_COUNT: 0
MDC_IDC_STAT_EPISODE_TOTAL_COUNT: 0
MDC_IDC_STAT_EPISODE_TOTAL_COUNT: 4
MDC_IDC_STAT_EPISODE_TOTAL_COUNT_DTM_END: NORMAL
MDC_IDC_STAT_EPISODE_TOTAL_COUNT_DTM_START: NORMAL
MDC_IDC_STAT_EPISODE_TYPE: NORMAL

## 2022-11-04 PROCEDURE — 93294 REM INTERROG EVL PM/LDLS PM: CPT | Performed by: INTERNAL MEDICINE

## 2022-11-04 PROCEDURE — 93296 REM INTERROG EVL PM/IDS: CPT | Performed by: INTERNAL MEDICINE

## 2022-12-13 ENCOUNTER — OFFICE VISIT (OUTPATIENT)
Dept: FAMILY MEDICINE | Facility: CLINIC | Age: 87
End: 2022-12-13
Payer: COMMERCIAL

## 2022-12-13 VITALS
HEIGHT: 66 IN | BODY MASS INDEX: 25.71 KG/M2 | SYSTOLIC BLOOD PRESSURE: 169 MMHG | HEART RATE: 51 BPM | WEIGHT: 160 LBS | TEMPERATURE: 97.3 F | RESPIRATION RATE: 20 BRPM | DIASTOLIC BLOOD PRESSURE: 68 MMHG | OXYGEN SATURATION: 97 %

## 2022-12-13 DIAGNOSIS — K22.11 ULCER OF ESOPHAGUS WITH BLEEDING: ICD-10-CM

## 2022-12-13 DIAGNOSIS — I10 HYPERTENSION GOAL BP (BLOOD PRESSURE) < 140/90: ICD-10-CM

## 2022-12-13 DIAGNOSIS — J45.40 MODERATE PERSISTENT ASTHMA WITHOUT COMPLICATION: ICD-10-CM

## 2022-12-13 DIAGNOSIS — D72.819 LEUKOPENIA, UNSPECIFIED TYPE: Primary | ICD-10-CM

## 2022-12-13 DIAGNOSIS — I50.9 ACUTE CONGESTIVE HEART FAILURE, UNSPECIFIED HEART FAILURE TYPE (H): ICD-10-CM

## 2022-12-13 LAB
BASOPHILS # BLD AUTO: 0 10E3/UL (ref 0–0.2)
BASOPHILS NFR BLD AUTO: 1 %
EOSINOPHIL # BLD AUTO: 0.2 10E3/UL (ref 0–0.7)
EOSINOPHIL NFR BLD AUTO: 5 %
ERYTHROCYTE [DISTWIDTH] IN BLOOD BY AUTOMATED COUNT: 18 % (ref 10–15)
HCT VFR BLD AUTO: 35 % (ref 40–53)
HGB BLD-MCNC: 10.9 G/DL (ref 13.3–17.7)
IMM GRANULOCYTES # BLD: 0 10E3/UL
IMM GRANULOCYTES NFR BLD: 0 %
LYMPHOCYTES # BLD AUTO: 0.5 10E3/UL (ref 0.8–5.3)
LYMPHOCYTES NFR BLD AUTO: 15 %
MCH RBC QN AUTO: 20.7 PG (ref 26.5–33)
MCHC RBC AUTO-ENTMCNC: 31.1 G/DL (ref 31.5–36.5)
MCV RBC AUTO: 67 FL (ref 78–100)
MONOCYTES # BLD AUTO: 0.3 10E3/UL (ref 0–1.3)
MONOCYTES NFR BLD AUTO: 9 %
NEUTROPHILS # BLD AUTO: 2.5 10E3/UL (ref 1.6–8.3)
NEUTROPHILS NFR BLD AUTO: 71 %
PLATELET # BLD AUTO: 162 10E3/UL (ref 150–450)
RBC # BLD AUTO: 5.26 10E6/UL (ref 4.4–5.9)
WBC # BLD AUTO: 3.6 10E3/UL (ref 4–11)

## 2022-12-13 PROCEDURE — 85025 COMPLETE CBC W/AUTO DIFF WBC: CPT | Performed by: FAMILY MEDICINE

## 2022-12-13 PROCEDURE — T1013 SIGN LANG/ORAL INTERPRETER: HCPCS | Mod: U3

## 2022-12-13 PROCEDURE — 36415 COLL VENOUS BLD VENIPUNCTURE: CPT | Performed by: FAMILY MEDICINE

## 2022-12-13 PROCEDURE — 99214 OFFICE O/P EST MOD 30 MIN: CPT | Performed by: FAMILY MEDICINE

## 2022-12-13 RX ORDER — METOPROLOL TARTRATE 25 MG/1
25 TABLET, FILM COATED ORAL 2 TIMES DAILY
Qty: 180 TABLET | Refills: 1 | Status: SHIPPED | OUTPATIENT
Start: 2022-12-13 | End: 2023-04-27

## 2022-12-13 RX ORDER — PANTOPRAZOLE SODIUM 40 MG/1
TABLET, DELAYED RELEASE ORAL
Qty: 180 TABLET | Refills: 1 | Status: SHIPPED | OUTPATIENT
Start: 2022-12-13 | End: 2023-05-30

## 2022-12-13 RX ORDER — MONTELUKAST SODIUM 10 MG/1
1 TABLET ORAL DAILY
Qty: 90 TABLET | Refills: 1 | Status: SHIPPED | OUTPATIENT
Start: 2022-12-13 | End: 2023-04-27

## 2022-12-13 NOTE — PROGRESS NOTES
"  Assessment & Plan     Leukopenia, unspecified type  Recheck today. Asymptomatic.   - CBC with platelets and differential; Future  - CBC with platelets and differential    Hypertension goal BP (blood pressure) < 140/90   bp above goal. Home bp stable. Stick to same rx for now specially for his age and recheck in a month or so.   - on hydralazine. Will not change. Relatively contraindicated due to his coronary artery disease diagnosis. Will defer it to cardiology.   - metoprolol tartrate (LOPRESSOR) 25 MG tablet; Take 1 tablet (25 mg) by mouth 2 times daily    Acute congestive heart failure, unspecified heart failure type (H)  Recent change in diuretic dose after discharge. Seeing cards next month. Stick to same. Weight is stable. Leg swelling not improving - use compression socks and he also has wraps and will try that too.       Ulcer of esophagus with bleeding  Despite possible risk, needs long term PPI. Stick to same rx.   - pantoprazole (PROTONIX) 40 MG EC tablet; TAKE 1 TABLET BEFORE MEALS TWICE A DAY Strength: 40 mg    Moderate persistent asthma without complication  Patient is tolerating current medication without any major side effects of concerns and current dose seems reasonable too.  Current medication regime is effective. Continue current treatment without any changes.   - montelukast (SINGULAIR) 10 MG tablet; Take 1 tablet (10 mg) by mouth daily             BMI:   Estimated body mass index is 25.71 kg/m  as calculated from the following:    Height as of this encounter: 1.68 m (5' 6.14\").    Weight as of this encounter: 72.6 kg (160 lb).           No follow-ups on file.    Dany Rodriguez MD, MD  [facility]    Javon Weldon is a 88 year old, presenting for the following health issues:  Follow Up (Labs/Swollen legs/Medication check)      HPI     Medication Followup of torsemide    Taking Medication as prescribed: yes    Side Effects:  itching    Medication Helping " "Symptoms:  NO    Yesterday 120-130/70 range at home.   130s range at home.     Torsemide - taking 15mg of torsemide every morning. Does not feel it is helping much - 3 pills are not changing leg swelling. Overnight leg swelling.goes down. [place] compression socks did not change leg swelling. Compression socks are new. Got it at hospital.   Passing urine 12-14 times per day due to above. Overnight not much urine output compare to day time.     Weight is ups and down. At home with cloths 156-158 baseline weight. Last weight this morning - 157 this am. Overall stable weight.   Cardiologist next month.        Review of Systems         Objective    BP (!) 169/68 (BP Location: Left arm, Patient Position: Sitting, Cuff Size: Adult Regular)   Pulse 51   Temp 97.3  F (36.3  C) (Temporal)   Resp 20   Ht 1.68 m (5' 6.14\")   Wt 72.6 kg (160 lb)   SpO2 97%   BMI 25.71 kg/m    Body mass index is 25.71 kg/m .  Physical Exam   Bilateral non pitting edema    Here with .                     "

## 2022-12-13 NOTE — LETTER
December 13, 2022      Shiraz Ingram  [address] [facility] [id number]        Dear ,    We are writing to inform you of your test results.     Your white count is improving and it is getting closer to normal range.  It is not urgent and you could have it rechecked in couple of months with your upcoming appointments.  No need for separate appointment.     Dany Rodriguez MD   [facility]       Resulted Orders   CBC with platelets and differential   Result Value Ref Range    WBC Count 3.6 (L) 4.0 - 11.0 10e3/uL    RBC Count 5.26 4.40 - 5.90 10e6/uL    Hemoglobin 10.9 (L) 13.3 - 17.7 g/dL    Hematocrit 35.0 (L) 40.0 - 53.0 %    MCV 67 (L) 78 - 100 fL    MCH 20.7 (L) 26.5 - 33.0 pg    MCHC 31.1 (L) 31.5 - 36.5 g/dL    RDW 18.0 (H) 10.0 - 15.0 %    Platelet Count 162 150 - 450 10e3/uL    % Neutrophils 71 %    % Lymphocytes 15 %    % Monocytes 9 %    % Eosinophils 5 %    % Basophils 1 %    % Immature Granulocytes 0 %    Absolute Neutrophils 2.5 1.6 - 8.3 10e3/uL    Absolute Lymphocytes 0.5 (L) 0.8 - 5.3 10e3/uL    Absolute Monocytes 0.3 0.0 - 1.3 10e3/uL    Absolute Eosinophils 0.2 0.0 - 0.7 10e3/uL    Absolute Basophils 0.0 0.0 - 0.2 10e3/uL    Absolute Immature Granulocytes 0.0 <=0.4 10e3/uL       If you have any questions or concerns, please call the clinic at the number listed above.       Sincerely,      DARRELL/ Dany Rodriguez MD

## 2023-01-10 ENCOUNTER — ANCILLARY PROCEDURE (OUTPATIENT)
Dept: CARDIOLOGY | Facility: CLINIC | Age: 88
End: 2023-01-10
Attending: INTERNAL MEDICINE
Payer: COMMERCIAL

## 2023-01-10 DIAGNOSIS — I49.5 SSS (SICK SINUS SYNDROME) (H): ICD-10-CM

## 2023-01-10 DIAGNOSIS — Z95.0 CARDIAC PACEMAKER IN SITU: ICD-10-CM

## 2023-01-10 PROCEDURE — 93279 PRGRMG DEV EVAL PM/LDLS PM: CPT | Performed by: INTERNAL MEDICINE

## 2023-01-11 ENCOUNTER — APPOINTMENT (OUTPATIENT)
Dept: CT IMAGING | Facility: CLINIC | Age: 88
DRG: 330 | End: 2023-01-11
Attending: EMERGENCY MEDICINE
Payer: COMMERCIAL

## 2023-01-11 ENCOUNTER — HOSPITAL ENCOUNTER (EMERGENCY)
Facility: CLINIC | Age: 88
Discharge: HOME | DRG: 330 | End: 2023-01-11
Attending: EMERGENCY MEDICINE | Admitting: EMERGENCY MEDICINE
Payer: COMMERCIAL

## 2023-01-11 VITALS
OXYGEN SATURATION: 92 % | SYSTOLIC BLOOD PRESSURE: 174 MMHG | RESPIRATION RATE: 16 BRPM | DIASTOLIC BLOOD PRESSURE: 72 MMHG | TEMPERATURE: 98.6 F | HEART RATE: 55 BPM

## 2023-01-11 DIAGNOSIS — M54.50 ACUTE BILATERAL LOW BACK PAIN WITHOUT SCIATICA: ICD-10-CM
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
## 2023-01-11 PROCEDURE — 99284 EMERGENCY DEPT VISIT MOD MDM: CPT | Mod: 25

## 2023-01-11 PROCEDURE — 72131 CT LUMBAR SPINE W/O DYE: CPT

## 2023-01-11 PROCEDURE — 250N000013 HC RX MED GY IP 250 OP 250 PS 637: Performed by: EMERGENCY MEDICINE

## 2023-01-11 RX ORDER — LIDOCAINE 4 G/G
2 PATCH TOPICAL
Status: DISCONTINUED | OUTPATIENT
Start: 2023-01-11 | End: 2023-01-11 | Stop reason: HOSPADM

## 2023-01-11 RX ORDER — LIDOCAINE 4 G/G
2 PATCH TOPICAL
Status: DISCONTINUED | OUTPATIENT
Start: 2023-01-11 | End: 2023-01-11

## 2023-01-11 RX ORDER — HYDROCODONE BITARTRATE AND ACETAMINOPHEN 5; 325 MG/1; MG/1
1-2 TABLET ORAL EVERY 6 HOURS PRN
Qty: 10 TABLET | Refills: 0 | Status: ON HOLD | OUTPATIENT
Start: 2023-01-11 | End: 2023-01-15

## 2023-01-11 RX ADMIN — LIDOCAINE 2 PATCH: 560 PATCH PERCUTANEOUS; TOPICAL; TRANSDERMAL at 17:21

## 2023-01-11 ASSESSMENT — ENCOUNTER SYMPTOMS
BACK PAIN: 1
ARTHRALGIAS: 0

## 2023-01-11 NOTE — ED TRIAGE NOTES
Son reports that pt walked out back down and fell on the steps 1130 today pt reports back pain with inability to sleep or sit for long periods     No loss of bowel or bladder     No n/v

## 2023-01-11 NOTE — ED PROVIDER NOTES
Rapid Assessment Note    History:   Shiraz Ingram is a 88 year old male who presents with lower back pain after he slipped on some stairs at 11:30am. He is not able to lay down and has pain when he sits. He lives with his son and his son did not know of the fall till later today. He has been having normal bowel movements. He is able to move all extremities. He took Tylenol today for pain at 2pm. He has a history of heart failure and is on Pradaxa. He is hard of hearing. He walks on his own and has never asked for help to walk till today. He denies hip pain.     Exam:   General:  Alert, interactive.  Tenderness to lower lumbar spine  Cardiovascular:  Well perfused  Lungs:  No respiratory distress, no accessory muscle use  Neuro:  Moving all 4 extremities  Skin:  Warm, dry  Psych:  Normal affect      Plan of Care:   I evaluated the patient and developed an initial plan of care. I discussed this plan and explained that I, or one of my partners, would be returning to complete the evaluation.     Fall, CMS intact to lower extremities.  No hip pain.  CT lumbar spine ordered.  Provided lidocaine patches.  Takes Pradaxa.        Aston RUTH, am serving as a scribe to document services personally performed by Danial Proctor DO, based on my observations and the provider's statements to me.    1/11/2023  [employer]    Portions of this medical record were completed by a scribe. UPON MY REVIEW AND AUTHENTICATION BY ELECTRONIC SIGNATURE, this confirms (a) I performed the applicable clinical services, and (b) the record is accurate.        Danial Proctor DO  01/11/23 8844

## 2023-01-12 LAB
MDC_IDC_LEAD_IMPLANT_DT: NORMAL
MDC_IDC_LEAD_LOCATION: NORMAL
MDC_IDC_LEAD_LOCATION_DETAIL_1: NORMAL
MDC_IDC_LEAD_MFG: NORMAL
MDC_IDC_LEAD_MODEL: NORMAL
MDC_IDC_LEAD_POLARITY_TYPE: NORMAL
MDC_IDC_LEAD_SERIAL: NORMAL
MDC_IDC_MSMT_BATTERY_DTM: NORMAL
MDC_IDC_MSMT_BATTERY_REMAINING_LONGEVITY: 156 MO
MDC_IDC_MSMT_BATTERY_RRT_TRIGGER: 2.62
MDC_IDC_MSMT_BATTERY_STATUS: NORMAL
MDC_IDC_MSMT_BATTERY_VOLTAGE: 3.05 V
MDC_IDC_MSMT_LEADCHNL_RV_IMPEDANCE_VALUE: 323 OHM
MDC_IDC_MSMT_LEADCHNL_RV_IMPEDANCE_VALUE: 475 OHM
MDC_IDC_MSMT_LEADCHNL_RV_PACING_THRESHOLD_AMPLITUDE: 1 V
MDC_IDC_MSMT_LEADCHNL_RV_PACING_THRESHOLD_PULSEWIDTH: 0.4 MS
MDC_IDC_MSMT_LEADCHNL_RV_SENSING_INTR_AMPL: 10.25 MV
MDC_IDC_MSMT_LEADCHNL_RV_SENSING_INTR_AMPL: 8.12 MV
MDC_IDC_PG_IMPLANT_DTM: NORMAL
MDC_IDC_PG_MFG: NORMAL
MDC_IDC_PG_MODEL: NORMAL
MDC_IDC_PG_SERIAL: NORMAL
MDC_IDC_PG_TYPE: NORMAL
MDC_IDC_SESS_CLINIC_NAME: NORMAL
MDC_IDC_SESS_DTM: NORMAL
MDC_IDC_SESS_TYPE: NORMAL
MDC_IDC_SET_BRADY_HYSTRATE: NORMAL
MDC_IDC_SET_BRADY_LOWRATE: 50 {BEATS}/MIN
MDC_IDC_SET_BRADY_MODE: NORMAL
MDC_IDC_SET_LEADCHNL_RV_PACING_AMPLITUDE: 2 V
MDC_IDC_SET_LEADCHNL_RV_PACING_ANODE_ELECTRODE_1: NORMAL
MDC_IDC_SET_LEADCHNL_RV_PACING_ANODE_LOCATION_1: NORMAL
MDC_IDC_SET_LEADCHNL_RV_PACING_CAPTURE_MODE: NORMAL
MDC_IDC_SET_LEADCHNL_RV_PACING_CATHODE_ELECTRODE_1: NORMAL
MDC_IDC_SET_LEADCHNL_RV_PACING_CATHODE_LOCATION_1: NORMAL
MDC_IDC_SET_LEADCHNL_RV_PACING_POLARITY: NORMAL
MDC_IDC_SET_LEADCHNL_RV_PACING_PULSEWIDTH: 0.4 MS
MDC_IDC_SET_LEADCHNL_RV_SENSING_ANODE_ELECTRODE_1: NORMAL
MDC_IDC_SET_LEADCHNL_RV_SENSING_ANODE_LOCATION_1: NORMAL
MDC_IDC_SET_LEADCHNL_RV_SENSING_CATHODE_ELECTRODE_1: NORMAL
MDC_IDC_SET_LEADCHNL_RV_SENSING_CATHODE_LOCATION_1: NORMAL
MDC_IDC_SET_LEADCHNL_RV_SENSING_POLARITY: NORMAL
MDC_IDC_SET_LEADCHNL_RV_SENSING_SENSITIVITY: 0.9 MV
MDC_IDC_SET_ZONE_DETECTION_INTERVAL: 360 MS
MDC_IDC_SET_ZONE_TYPE: NORMAL
MDC_IDC_STAT_BRADY_DTM_END: NORMAL
MDC_IDC_STAT_BRADY_DTM_START: NORMAL
MDC_IDC_STAT_BRADY_RV_PERCENT_PACED: 40.58 %
MDC_IDC_STAT_EPISODE_RECENT_COUNT: 0
MDC_IDC_STAT_EPISODE_RECENT_COUNT: 1
MDC_IDC_STAT_EPISODE_RECENT_COUNT_DTM_END: NORMAL
MDC_IDC_STAT_EPISODE_RECENT_COUNT_DTM_END: NORMAL
MDC_IDC_STAT_EPISODE_RECENT_COUNT_DTM_START: NORMAL
MDC_IDC_STAT_EPISODE_RECENT_COUNT_DTM_START: NORMAL
MDC_IDC_STAT_EPISODE_TOTAL_COUNT: 0
MDC_IDC_STAT_EPISODE_TOTAL_COUNT: 4
MDC_IDC_STAT_EPISODE_TOTAL_COUNT_DTM_END: NORMAL
MDC_IDC_STAT_EPISODE_TOTAL_COUNT_DTM_END: NORMAL
MDC_IDC_STAT_EPISODE_TOTAL_COUNT_DTM_START: NORMAL
MDC_IDC_STAT_EPISODE_TOTAL_COUNT_DTM_START: NORMAL
MDC_IDC_STAT_EPISODE_TYPE: NORMAL

## 2023-01-12 ASSESSMENT — ENCOUNTER SYMPTOMS
FEVER: 0
SHORTNESS OF BREATH: 0
ABDOMINAL PAIN: 0

## 2023-01-12 NOTE — ED PROVIDER NOTES
History     Chief Complaint:  Back pain     HPI   Shiraz Ingram is a 88 year old male who presents with lower back pain after he slipped on some stairs at 11:30am. He is not able to lay down and has pain when he sits. He lives with his son and his son did not know of the fall till later today. He has been having normal bowel movements. He is able to move all extremities. He took Tylenol today for pain at 2pm. He has a history of heart failure and is on Pradaxa. He is hard of hearing. He walks on his own and has never asked for help to walk till today. He denies hip pain.     Independent Historian: his son     ROS:  Review of Systems   Constitutional: Negative for fever.   Respiratory: Negative for shortness of breath.    Cardiovascular: Negative for chest pain.   Gastrointestinal: Negative for abdominal pain.   Musculoskeletal: Positive for back pain. Negative for arthralgias.   All other systems reviewed and are negative.    Allergies:  No Known Allergies     Medications:    Albuterol  ASA 81  Pradaxa  Imdur  Lopressor  Singulair  Nitrostat  Protonix  Zocor  Demadex    Past Medical History:    Anemia  Aortic stenosis  Pacemaker  CKD  Coronary atherosclerosis  Esophageal ulcer  Hyperlipidemia  Hypertension  Osteoarthrosis   Asthma  Atrial fibrillation   Hearing loss    Past Surgical History:    Cardiac surgery  Colonoscopy  Angiogram   Heart cath  Pacemaker  Esophagoscopy  Herniorrhaphy   Laparoscopic cholecystectomy  Orthopedic surgery   Colonoscopy    Family History:    family history includes C.A.D. in his father; CABG in his son; Cancer in his mother; Cerebrovascular Disease in his son; Family History Negative in his child and sister; Heart Disease in his brother.    Social History:   reports that he has never smoked. He has never used smokeless tobacco. He reports that he does not currently use alcohol. He reports that he does not use drugs.  PCP: Dany Rodriguez     Physical Exam     Patient Vitals  for the past 24 hrs:   BP Temp Temp src Pulse Resp SpO2   01/11/23 1712 (!) 174/72 98.6  F (37  C) Temporal 55 16 92 %        Physical Exam  General: Patient in mild distress.  Alert and cooperative with exam. Normal mentation  HEENT: NC/AT. Conjunctiva without injection or scleral icterus. External ears normal.  Respiratory: Breathing comfortably on room air  CV: Normal rate, all extremities well perfused  GI:  Non-distended abdomen, Tenderness to lumbar spinal muscle and mid line spine. No overlying skin changes or bruising. Able to bear weight. CMS intact to lower extremities  Skin: Warm, dry, no rashes/open wounds on exposed skin  Musculoskeletal: No obvious deformities  Neuro: Alert, answers questions appropriately. No gross motor deficits    Emergency Department Course     Imaging:  Lumbar spine CT w/o contrast   Final Result   IMPRESSION:   1.  Chronic compression fracture deformities of T12 and L2 again noted.   2.  Interval development of a chronic appearing bony deformity in the superior aspect of the L5 vertebral body that could be degenerative or traumatic in nature.   3.  No recent fractures.   4.  Worsening degenerative anterolisthesis of L4 upon L5.   5.  No spinal canal stenosis.         Report per radiology    Emergency Department Course & Assessments:             Interventions:  Medications   Lidocaine (LIDOCARE) 4 % Patch 2 patch (2 patches Transdermal Patch/Med Applied 1/11/23 1721)     And   lidocaine patch in PLACE (has no administration in time range)      Disposition:  The patient was discharged to home.     Impression & Plan      Medical Decision Making:  Shiraz Ingram is a 88 year old male who presents for evaluation of back pain that started after a fall.   Pain has improved with interventions in the emergency department.  CT imaging of the lumbar spine without evidence of acute fracture; see complete results above.  There is no clinical evidence of cauda equina syndrome, discitis,  spinal/epidural space hematoma or epidural abscess or other worrisome etiology.  Patient able to bear weight and I feel he is safe and appropriate for continued outpatient management of musculoskeletal back pain.  Patient was provided short prescription for Norco for breakthrough pain.  Supportive care discussed.  Recommended close follow-up with PCP.  Return precautions discussed.      Diagnosis:    ICD-10-CM    1. Acute bilateral low back pain without sciatica  M54.50       2. Fall, initial encounter  W19.XXXA            Discharge Medications:  New Prescriptions    HYDROCODONE-ACETAMINOPHEN (NORCO) 5-325 MG TABLET    Take 1-2 tablets by mouth every 6 hours as needed for breakthrough pain        Scribe Disclosure:  FARAZ, Aston Andino, am serving as a scribe at 8:31 PM on 1/11/2023 to document services personally performed by Danial Proctor DO based on my observations and the provider's statements to me.            Danial Proctor DO  01/12/23 1424

## 2023-01-12 NOTE — DISCHARGE INSTRUCTIONS
Return to the emergency department or seek medical care as instructed if your symptoms fail to improve or significantly worsen.    Take Acetaminophen (aka Tylenol) or Norco (prescription pain medicine) as needed for symptom/pain relief; use as directed.    Ice area of pain for 20 minutes four times per day for the next two days    Over the counter 4% lidocaine patches as needed for additional pain control.    Follow-up as indicated on page 1.  Maintain adequate hydration and get plenty of rest.

## 2023-01-13 ENCOUNTER — TELEPHONE (OUTPATIENT)
Dept: FAMILY MEDICINE | Facility: CLINIC | Age: 88
End: 2023-01-13

## 2023-01-13 ENCOUNTER — HOSPITAL ENCOUNTER (INPATIENT)
Facility: CLINIC | Age: 88
LOS: 8 days | Discharge: HOME | DRG: 330 | End: 2023-01-21
Attending: EMERGENCY MEDICINE | Admitting: SURGERY
Payer: COMMERCIAL

## 2023-01-13 ENCOUNTER — APPOINTMENT (OUTPATIENT)
Dept: CT IMAGING | Facility: CLINIC | Age: 88
DRG: 330 | End: 2023-01-13
Attending: EMERGENCY MEDICINE
Payer: COMMERCIAL

## 2023-01-13 ENCOUNTER — NURSE TRIAGE (OUTPATIENT)
Dept: NURSING | Facility: CLINIC | Age: 88
End: 2023-01-13

## 2023-01-13 ENCOUNTER — ANESTHESIA EVENT (OUTPATIENT)
Dept: SURGERY | Facility: CLINIC | Age: 88
DRG: 330 | End: 2023-01-13
Payer: COMMERCIAL

## 2023-01-13 ENCOUNTER — ANESTHESIA (OUTPATIENT)
Dept: SURGERY | Facility: CLINIC | Age: 88
DRG: 330 | End: 2023-01-13
Payer: COMMERCIAL

## 2023-01-13 ENCOUNTER — APPOINTMENT (OUTPATIENT)
Dept: GENERAL RADIOLOGY | Facility: CLINIC | Age: 88
DRG: 330 | End: 2023-01-13
Attending: EMERGENCY MEDICINE
Payer: COMMERCIAL

## 2023-01-13 DIAGNOSIS — K46.0 INCARCERATED HERNIA: ICD-10-CM

## 2023-01-13 DIAGNOSIS — D63.8 ANEMIA OF CHRONIC DISEASE: ICD-10-CM

## 2023-01-13 DIAGNOSIS — Z98.890 POSTOPERATIVE STATE: Primary | ICD-10-CM

## 2023-01-13 DIAGNOSIS — K56.609 SMALL BOWEL OBSTRUCTION (H): ICD-10-CM

## 2023-01-13 LAB
ABO/RH(D): NORMAL
ALBUMIN SERPL-MCNC: 3.8 G/DL (ref 3.4–5)
ALBUMIN UR-MCNC: 30 MG/DL
ALP SERPL-CCNC: 89 U/L (ref 40–150)
ALT SERPL W P-5'-P-CCNC: 24 U/L (ref 0–70)
ANION GAP SERPL CALCULATED.3IONS-SCNC: 8 MMOL/L (ref 3–14)
ANTIBODY SCREEN: NEGATIVE
APPEARANCE UR: CLEAR
AST SERPL W P-5'-P-CCNC: 24 U/L (ref 0–45)
BASOPHILS # BLD AUTO: 0 10E3/UL (ref 0–0.2)
BASOPHILS NFR BLD AUTO: 1 %
BILIRUB SERPL-MCNC: 0.7 MG/DL (ref 0.2–1.3)
BILIRUB UR QL STRIP: NEGATIVE
BUN SERPL-MCNC: 48 MG/DL (ref 7–30)
CALCIUM SERPL-MCNC: 10.1 MG/DL (ref 8.5–10.1)
CHLORIDE BLD-SCNC: 104 MMOL/L (ref 94–109)
CO2 SERPL-SCNC: 28 MMOL/L (ref 20–32)
COLOR UR AUTO: ABNORMAL
CREAT SERPL-MCNC: 1.94 MG/DL (ref 0.66–1.25)
EOSINOPHIL # BLD AUTO: 0.2 10E3/UL (ref 0–0.7)
EOSINOPHIL NFR BLD AUTO: 3 %
ERYTHROCYTE [DISTWIDTH] IN BLOOD BY AUTOMATED COUNT: 17.7 % (ref 10–15)
GFR SERPL CREATININE-BSD FRML MDRD: 33 ML/MIN/1.73M2
GLUCOSE BLD-MCNC: 141 MG/DL (ref 70–99)
GLUCOSE UR STRIP-MCNC: NEGATIVE MG/DL
HCT VFR BLD AUTO: 38 % (ref 40–53)
HGB BLD-MCNC: 11.5 G/DL (ref 13.3–17.7)
HGB UR QL STRIP: NEGATIVE
HOLD SPECIMEN: NORMAL
IMM GRANULOCYTES # BLD: 0 10E3/UL
IMM GRANULOCYTES NFR BLD: 0 %
KETONES UR STRIP-MCNC: NEGATIVE MG/DL
LACTATE SERPL-SCNC: 1.3 MMOL/L (ref 0.7–2)
LEUKOCYTE ESTERASE UR QL STRIP: NEGATIVE
LIPASE SERPL-CCNC: 67 U/L (ref 73–393)
LYMPHOCYTES # BLD AUTO: 0.4 10E3/UL (ref 0.8–5.3)
LYMPHOCYTES NFR BLD AUTO: 8 %
MCH RBC QN AUTO: 20.5 PG (ref 26.5–33)
MCHC RBC AUTO-ENTMCNC: 30.3 G/DL (ref 31.5–36.5)
MCV RBC AUTO: 68 FL (ref 78–100)
MONOCYTES # BLD AUTO: 0.3 10E3/UL (ref 0–1.3)
MONOCYTES NFR BLD AUTO: 6 %
NEUTROPHILS # BLD AUTO: 4.5 10E3/UL (ref 1.6–8.3)
NEUTROPHILS NFR BLD AUTO: 82 %
NITRATE UR QL: NEGATIVE
NRBC # BLD AUTO: 0 10E3/UL
NRBC BLD AUTO-RTO: 0 /100
PH UR STRIP: 5 [PH] (ref 5–7)
PLATELET # BLD AUTO: 138 10E3/UL (ref 150–450)
POTASSIUM BLD-SCNC: 4.7 MMOL/L (ref 3.4–5.3)
PROT SERPL-MCNC: 8.2 G/DL (ref 6.8–8.8)
RBC # BLD AUTO: 5.6 10E6/UL (ref 4.4–5.9)
RBC URINE: <1 /HPF
SARS-COV-2 RNA RESP QL NAA+PROBE: NEGATIVE
SODIUM SERPL-SCNC: 140 MMOL/L (ref 133–144)
SP GR UR STRIP: 1.01 (ref 1–1.03)
SPECIMEN EXPIRATION DATE: NORMAL
UROBILINOGEN UR STRIP-MCNC: NORMAL MG/DL
WBC # BLD AUTO: 5.5 10E3/UL (ref 4–11)
WBC URINE: <1 /HPF

## 2023-01-13 PROCEDURE — 80053 COMPREHEN METABOLIC PANEL: CPT | Performed by: EMERGENCY MEDICINE

## 2023-01-13 PROCEDURE — 250N000013 HC RX MED GY IP 250 OP 250 PS 637: Performed by: PHYSICIAN ASSISTANT

## 2023-01-13 PROCEDURE — 250N000011 HC RX IP 250 OP 636: Performed by: PHYSICIAN ASSISTANT

## 2023-01-13 PROCEDURE — 250N000011 HC RX IP 250 OP 636: Performed by: EMERGENCY MEDICINE

## 2023-01-13 PROCEDURE — 83605 ASSAY OF LACTIC ACID: CPT | Performed by: EMERGENCY MEDICINE

## 2023-01-13 PROCEDURE — 250N000011 HC RX IP 250 OP 636: Performed by: NURSE ANESTHETIST, CERTIFIED REGISTERED

## 2023-01-13 PROCEDURE — 360N000075 HC SURGERY LEVEL 2, PER MIN: Performed by: SURGERY

## 2023-01-13 PROCEDURE — 85025 COMPLETE CBC W/AUTO DIFF WBC: CPT | Performed by: EMERGENCY MEDICINE

## 2023-01-13 PROCEDURE — 49553 RPR FEM HERNIA INIT BLOCKED: CPT | Mod: AS | Performed by: PHYSICIAN ASSISTANT

## 2023-01-13 PROCEDURE — 99222 1ST HOSP IP/OBS MODERATE 55: CPT | Mod: 57 | Performed by: SURGERY

## 2023-01-13 PROCEDURE — 99285 EMERGENCY DEPT VISIT HI MDM: CPT | Mod: 25

## 2023-01-13 PROCEDURE — 710N000009 HC RECOVERY PHASE 1, LEVEL 1, PER MIN: Performed by: SURGERY

## 2023-01-13 PROCEDURE — 49553 RPR FEM HERNIA INIT BLOCKED: CPT | Performed by: SURGERY

## 2023-01-13 PROCEDURE — 370N000017 HC ANESTHESIA TECHNICAL FEE, PER MIN: Performed by: SURGERY

## 2023-01-13 PROCEDURE — 71045 X-RAY EXAM CHEST 1 VIEW: CPT

## 2023-01-13 PROCEDURE — 250N000011 HC RX IP 250 OP 636: Performed by: SURGERY

## 2023-01-13 PROCEDURE — 999N000141 HC STATISTIC PRE-PROCEDURE NURSING ASSESSMENT: Performed by: SURGERY

## 2023-01-13 PROCEDURE — 258N000003 HC RX IP 258 OP 636: Performed by: ANESTHESIOLOGY

## 2023-01-13 PROCEDURE — 88302 TISSUE EXAM BY PATHOLOGIST: CPT | Mod: TC | Performed by: SURGERY

## 2023-01-13 PROCEDURE — 250N000025 HC SEVOFLURANE, PER MIN: Performed by: SURGERY

## 2023-01-13 PROCEDURE — 81001 URINALYSIS AUTO W/SCOPE: CPT | Performed by: EMERGENCY MEDICINE

## 2023-01-13 PROCEDURE — 96361 HYDRATE IV INFUSION ADD-ON: CPT

## 2023-01-13 PROCEDURE — 83690 ASSAY OF LIPASE: CPT | Performed by: EMERGENCY MEDICINE

## 2023-01-13 PROCEDURE — 88302 TISSUE EXAM BY PATHOLOGIST: CPT | Mod: 26 | Performed by: PATHOLOGY

## 2023-01-13 PROCEDURE — 96375 TX/PRO/DX INJ NEW DRUG ADDON: CPT

## 2023-01-13 PROCEDURE — 272N000001 HC OR GENERAL SUPPLY STERILE: Performed by: SURGERY

## 2023-01-13 PROCEDURE — 86923 COMPATIBILITY TEST ELECTRIC: CPT | Performed by: NURSE PRACTITIONER

## 2023-01-13 PROCEDURE — 258N000003 HC RX IP 258 OP 636: Performed by: NURSE ANESTHETIST, CERTIFIED REGISTERED

## 2023-01-13 PROCEDURE — 250N000009 HC RX 250: Performed by: EMERGENCY MEDICINE

## 2023-01-13 PROCEDURE — 96376 TX/PRO/DX INJ SAME DRUG ADON: CPT

## 2023-01-13 PROCEDURE — 250N000009 HC RX 250: Performed by: SURGERY

## 2023-01-13 PROCEDURE — 250N000009 HC RX 250: Performed by: NURSE ANESTHETIST, CERTIFIED REGISTERED

## 2023-01-13 PROCEDURE — C1781 MESH (IMPLANTABLE): HCPCS | Performed by: SURGERY

## 2023-01-13 PROCEDURE — 74176 CT ABD & PELVIS W/O CONTRAST: CPT

## 2023-01-13 PROCEDURE — C9803 HOPD COVID-19 SPEC COLLECT: HCPCS

## 2023-01-13 PROCEDURE — 36415 COLL VENOUS BLD VENIPUNCTURE: CPT | Performed by: EMERGENCY MEDICINE

## 2023-01-13 PROCEDURE — 0DB80ZZ EXCISION OF SMALL INTESTINE, OPEN APPROACH: ICD-10-PCS | Performed by: SURGERY

## 2023-01-13 PROCEDURE — 0YU60JZ SUPPLEMENT LEFT INGUINAL REGION WITH SYNTHETIC SUBSTITUTE, OPEN APPROACH: ICD-10-PCS | Performed by: SURGERY

## 2023-01-13 PROCEDURE — 86901 BLOOD TYPING SEROLOGIC RH(D): CPT | Performed by: EMERGENCY MEDICINE

## 2023-01-13 PROCEDURE — 258N000003 HC RX IP 258 OP 636: Performed by: PHYSICIAN ASSISTANT

## 2023-01-13 PROCEDURE — U0003 INFECTIOUS AGENT DETECTION BY NUCLEIC ACID (DNA OR RNA); SEVERE ACUTE RESPIRATORY SYNDROME CORONAVIRUS 2 (SARS-COV-2) (CORONAVIRUS DISEASE [COVID-19]), AMPLIFIED PROBE TECHNIQUE, MAKING USE OF HIGH THROUGHPUT TECHNOLOGIES AS DESCRIBED BY CMS-2020-01-R: HCPCS | Performed by: EMERGENCY MEDICINE

## 2023-01-13 PROCEDURE — 96374 THER/PROPH/DIAG INJ IV PUSH: CPT

## 2023-01-13 PROCEDURE — 258N000003 HC RX IP 258 OP 636: Performed by: EMERGENCY MEDICINE

## 2023-01-13 PROCEDURE — 120N000001 HC R&B MED SURG/OB

## 2023-01-13 DEVICE — MESH PROGRIP 4.7X3" PARIETEX LEFT TEM1208GL: Type: IMPLANTABLE DEVICE | Site: INGUINAL | Status: FUNCTIONAL

## 2023-01-13 DEVICE — MESH COMPOSITE W/COLLAGEN 6CM OPEN VENTRAL PARIETEX PCO6VP: Type: IMPLANTABLE DEVICE | Site: INGUINAL | Status: FUNCTIONAL

## 2023-01-13 RX ORDER — DEXAMETHASONE SODIUM PHOSPHATE 4 MG/ML
INJECTION, SOLUTION INTRA-ARTICULAR; INTRALESIONAL; INTRAMUSCULAR; INTRAVENOUS; SOFT TISSUE PRN
Status: DISCONTINUED | OUTPATIENT
Start: 2023-01-13 | End: 2023-01-13

## 2023-01-13 RX ORDER — LIDOCAINE 40 MG/G
CREAM TOPICAL
Status: DISCONTINUED | OUTPATIENT
Start: 2023-01-13 | End: 2023-01-21 | Stop reason: HOSPADM

## 2023-01-13 RX ORDER — FENTANYL CITRATE 0.05 MG/ML
50 INJECTION, SOLUTION INTRAMUSCULAR; INTRAVENOUS EVERY 5 MIN PRN
Status: DISCONTINUED | OUTPATIENT
Start: 2023-01-13 | End: 2023-01-13 | Stop reason: HOSPADM

## 2023-01-13 RX ORDER — BISACODYL 10 MG
10 SUPPOSITORY, RECTAL RECTAL DAILY PRN
Status: DISCONTINUED | OUTPATIENT
Start: 2023-01-13 | End: 2023-01-21 | Stop reason: HOSPADM

## 2023-01-13 RX ORDER — NALOXONE HYDROCHLORIDE 0.4 MG/ML
0.2 INJECTION, SOLUTION INTRAMUSCULAR; INTRAVENOUS; SUBCUTANEOUS
Status: DISCONTINUED | OUTPATIENT
Start: 2023-01-13 | End: 2023-01-21 | Stop reason: HOSPADM

## 2023-01-13 RX ORDER — ISOSORBIDE MONONITRATE 30 MG/1
30 TABLET, EXTENDED RELEASE ORAL DAILY
Status: DISCONTINUED | OUTPATIENT
Start: 2023-01-14 | End: 2023-01-21 | Stop reason: HOSPADM

## 2023-01-13 RX ORDER — ACETAMINOPHEN 325 MG/1
975 TABLET ORAL EVERY 8 HOURS
Status: COMPLETED | OUTPATIENT
Start: 2023-01-13 | End: 2023-01-16

## 2023-01-13 RX ORDER — HYDROMORPHONE HCL IN WATER/PF 6 MG/30 ML
0.4 PATIENT CONTROLLED ANALGESIA SYRINGE INTRAVENOUS EVERY 5 MIN PRN
Status: DISCONTINUED | OUTPATIENT
Start: 2023-01-13 | End: 2023-01-13 | Stop reason: HOSPADM

## 2023-01-13 RX ORDER — HYDROMORPHONE HCL IN WATER/PF 6 MG/30 ML
0.2 PATIENT CONTROLLED ANALGESIA SYRINGE INTRAVENOUS
Status: DISCONTINUED | OUTPATIENT
Start: 2023-01-13 | End: 2023-01-21 | Stop reason: HOSPADM

## 2023-01-13 RX ORDER — EPHEDRINE SULFATE 50 MG/ML
INJECTION, SOLUTION INTRAMUSCULAR; INTRAVENOUS; SUBCUTANEOUS PRN
Status: DISCONTINUED | OUTPATIENT
Start: 2023-01-13 | End: 2023-01-13

## 2023-01-13 RX ORDER — SODIUM CHLORIDE 9 MG/ML
INJECTION, SOLUTION INTRAVENOUS CONTINUOUS
Status: DISCONTINUED | OUTPATIENT
Start: 2023-01-13 | End: 2023-01-14

## 2023-01-13 RX ORDER — METOPROLOL TARTRATE 1 MG/ML
5 INJECTION, SOLUTION INTRAVENOUS EVERY 6 HOURS
Status: DISCONTINUED | OUTPATIENT
Start: 2023-01-14 | End: 2023-01-13

## 2023-01-13 RX ORDER — ONDANSETRON 2 MG/ML
INJECTION INTRAMUSCULAR; INTRAVENOUS PRN
Status: DISCONTINUED | OUTPATIENT
Start: 2023-01-13 | End: 2023-01-13

## 2023-01-13 RX ORDER — LIDOCAINE HYDROCHLORIDE 20 MG/ML
INJECTION, SOLUTION INFILTRATION; PERINEURAL PRN
Status: DISCONTINUED | OUTPATIENT
Start: 2023-01-13 | End: 2023-01-13

## 2023-01-13 RX ORDER — ONDANSETRON 4 MG/1
4 TABLET, ORALLY DISINTEGRATING ORAL EVERY 6 HOURS PRN
Status: DISCONTINUED | OUTPATIENT
Start: 2023-01-13 | End: 2023-01-21 | Stop reason: HOSPADM

## 2023-01-13 RX ORDER — NALOXONE HYDROCHLORIDE 0.4 MG/ML
0.4 INJECTION, SOLUTION INTRAMUSCULAR; INTRAVENOUS; SUBCUTANEOUS
Status: DISCONTINUED | OUTPATIENT
Start: 2023-01-13 | End: 2023-01-21 | Stop reason: HOSPADM

## 2023-01-13 RX ORDER — FENTANYL CITRATE 0.05 MG/ML
25 INJECTION, SOLUTION INTRAMUSCULAR; INTRAVENOUS EVERY 5 MIN PRN
Status: DISCONTINUED | OUTPATIENT
Start: 2023-01-13 | End: 2023-01-13 | Stop reason: HOSPADM

## 2023-01-13 RX ORDER — BUPIVACAINE HYDROCHLORIDE AND EPINEPHRINE 5; 5 MG/ML; UG/ML
INJECTION, SOLUTION PERINEURAL PRN
Status: DISCONTINUED | OUTPATIENT
Start: 2023-01-13 | End: 2023-01-13 | Stop reason: HOSPADM

## 2023-01-13 RX ORDER — CALCIUM CARBONATE 500 MG/1
500 TABLET, CHEWABLE ORAL 4 TIMES DAILY PRN
Status: DISCONTINUED | OUTPATIENT
Start: 2023-01-13 | End: 2023-01-21 | Stop reason: HOSPADM

## 2023-01-13 RX ORDER — ONDANSETRON 2 MG/ML
4 INJECTION INTRAMUSCULAR; INTRAVENOUS EVERY 30 MIN PRN
Status: DISCONTINUED | OUTPATIENT
Start: 2023-01-13 | End: 2023-01-13 | Stop reason: HOSPADM

## 2023-01-13 RX ORDER — LIDOCAINE HYDROCHLORIDE 20 MG/ML
10 JELLY TOPICAL ONCE
Status: COMPLETED | OUTPATIENT
Start: 2023-01-13 | End: 2023-01-13

## 2023-01-13 RX ORDER — PROPOFOL 10 MG/ML
INJECTION, EMULSION INTRAVENOUS CONTINUOUS PRN
Status: DISCONTINUED | OUTPATIENT
Start: 2023-01-13 | End: 2023-01-13

## 2023-01-13 RX ORDER — ONDANSETRON 4 MG/1
4 TABLET, ORALLY DISINTEGRATING ORAL EVERY 30 MIN PRN
Status: DISCONTINUED | OUTPATIENT
Start: 2023-01-13 | End: 2023-01-13 | Stop reason: HOSPADM

## 2023-01-13 RX ORDER — HYDROMORPHONE HCL IN WATER/PF 6 MG/30 ML
0.1 PATIENT CONTROLLED ANALGESIA SYRINGE INTRAVENOUS
Status: DISCONTINUED | OUTPATIENT
Start: 2023-01-13 | End: 2023-01-21 | Stop reason: HOSPADM

## 2023-01-13 RX ORDER — OXYCODONE HYDROCHLORIDE 5 MG/1
5 TABLET ORAL EVERY 4 HOURS PRN
Status: DISCONTINUED | OUTPATIENT
Start: 2023-01-13 | End: 2023-01-21 | Stop reason: HOSPADM

## 2023-01-13 RX ORDER — FENTANYL CITRATE 50 UG/ML
INJECTION, SOLUTION INTRAMUSCULAR; INTRAVENOUS PRN
Status: DISCONTINUED | OUTPATIENT
Start: 2023-01-13 | End: 2023-01-13

## 2023-01-13 RX ORDER — HYDROMORPHONE HCL IN WATER/PF 6 MG/30 ML
0.2 PATIENT CONTROLLED ANALGESIA SYRINGE INTRAVENOUS EVERY 5 MIN PRN
Status: DISCONTINUED | OUTPATIENT
Start: 2023-01-13 | End: 2023-01-13 | Stop reason: HOSPADM

## 2023-01-13 RX ORDER — TORSEMIDE 10 MG/1
10 TABLET ORAL DAILY
Status: DISCONTINUED | OUTPATIENT
Start: 2023-01-14 | End: 2023-01-21 | Stop reason: HOSPADM

## 2023-01-13 RX ORDER — MONTELUKAST SODIUM 10 MG/1
10 TABLET ORAL DAILY
Status: DISCONTINUED | OUTPATIENT
Start: 2023-01-14 | End: 2023-01-21 | Stop reason: HOSPADM

## 2023-01-13 RX ORDER — MAGNESIUM HYDROXIDE 1200 MG/15ML
LIQUID ORAL PRN
Status: DISCONTINUED | OUTPATIENT
Start: 2023-01-13 | End: 2023-01-13 | Stop reason: HOSPADM

## 2023-01-13 RX ORDER — NEOSTIGMINE METHYLSULFATE 1 MG/ML
VIAL (ML) INJECTION PRN
Status: DISCONTINUED | OUTPATIENT
Start: 2023-01-13 | End: 2023-01-13

## 2023-01-13 RX ORDER — HYDROMORPHONE HYDROCHLORIDE 1 MG/ML
INJECTION, SOLUTION INTRAMUSCULAR; INTRAVENOUS; SUBCUTANEOUS PRN
Status: DISCONTINUED | OUTPATIENT
Start: 2023-01-13 | End: 2023-01-13

## 2023-01-13 RX ORDER — ONDANSETRON 2 MG/ML
4 INJECTION INTRAMUSCULAR; INTRAVENOUS EVERY 30 MIN PRN
Status: DISCONTINUED | OUTPATIENT
Start: 2023-01-13 | End: 2023-01-13

## 2023-01-13 RX ORDER — ALBUTEROL SULFATE 90 UG/1
1-2 AEROSOL, METERED RESPIRATORY (INHALATION) EVERY 4 HOURS PRN
Status: DISCONTINUED | OUTPATIENT
Start: 2023-01-13 | End: 2023-01-21 | Stop reason: HOSPADM

## 2023-01-13 RX ORDER — PANTOPRAZOLE SODIUM 40 MG/1
40 TABLET, DELAYED RELEASE ORAL
Status: DISCONTINUED | OUTPATIENT
Start: 2023-01-14 | End: 2023-01-21

## 2023-01-13 RX ORDER — NITROGLYCERIN 0.4 MG/1
0.4 TABLET SUBLINGUAL EVERY 5 MIN PRN
Status: DISCONTINUED | OUTPATIENT
Start: 2023-01-13 | End: 2023-01-21 | Stop reason: HOSPADM

## 2023-01-13 RX ORDER — IPRATROPIUM BROMIDE AND ALBUTEROL SULFATE 2.5; .5 MG/3ML; MG/3ML
3 SOLUTION RESPIRATORY (INHALATION)
Status: DISCONTINUED | OUTPATIENT
Start: 2023-01-14 | End: 2023-01-21 | Stop reason: HOSPADM

## 2023-01-13 RX ORDER — PIPERACILLIN SODIUM, TAZOBACTAM SODIUM 2; .25 G/10ML; G/10ML
2.25 INJECTION, POWDER, LYOPHILIZED, FOR SOLUTION INTRAVENOUS EVERY 6 HOURS
Status: COMPLETED | OUTPATIENT
Start: 2023-01-13 | End: 2023-01-16

## 2023-01-13 RX ORDER — PROPOFOL 10 MG/ML
INJECTION, EMULSION INTRAVENOUS PRN
Status: DISCONTINUED | OUTPATIENT
Start: 2023-01-13 | End: 2023-01-13

## 2023-01-13 RX ORDER — METOPROLOL TARTRATE 25 MG/1
25 TABLET, FILM COATED ORAL 2 TIMES DAILY
Status: DISCONTINUED | OUTPATIENT
Start: 2023-01-13 | End: 2023-01-21 | Stop reason: HOSPADM

## 2023-01-13 RX ORDER — CEFAZOLIN SODIUM/WATER 2 G/20 ML
2 SYRINGE (ML) INTRAVENOUS
Status: COMPLETED | OUTPATIENT
Start: 2023-01-13 | End: 2023-01-13

## 2023-01-13 RX ORDER — GLYCOPYRROLATE 0.2 MG/ML
INJECTION, SOLUTION INTRAMUSCULAR; INTRAVENOUS PRN
Status: DISCONTINUED | OUTPATIENT
Start: 2023-01-13 | End: 2023-01-13

## 2023-01-13 RX ORDER — SODIUM CHLORIDE, SODIUM LACTATE, POTASSIUM CHLORIDE, CALCIUM CHLORIDE 600; 310; 30; 20 MG/100ML; MG/100ML; MG/100ML; MG/100ML
INJECTION, SOLUTION INTRAVENOUS CONTINUOUS
Status: DISCONTINUED | OUTPATIENT
Start: 2023-01-13 | End: 2023-01-13 | Stop reason: HOSPADM

## 2023-01-13 RX ORDER — MORPHINE SULFATE 4 MG/ML
4 INJECTION, SOLUTION INTRAMUSCULAR; INTRAVENOUS
Status: DISCONTINUED | OUTPATIENT
Start: 2023-01-13 | End: 2023-01-13

## 2023-01-13 RX ORDER — POLYETHYLENE GLYCOL 3350 17 G/17G
17 POWDER, FOR SOLUTION ORAL DAILY
Status: DISCONTINUED | OUTPATIENT
Start: 2023-01-14 | End: 2023-01-19

## 2023-01-13 RX ORDER — OXYMETAZOLINE HYDROCHLORIDE 0.05 G/100ML
2 SPRAY NASAL ONCE
Status: COMPLETED | OUTPATIENT
Start: 2023-01-13 | End: 2023-01-13

## 2023-01-13 RX ORDER — ONDANSETRON 2 MG/ML
4 INJECTION INTRAMUSCULAR; INTRAVENOUS EVERY 6 HOURS PRN
Status: DISCONTINUED | OUTPATIENT
Start: 2023-01-13 | End: 2023-01-21 | Stop reason: HOSPADM

## 2023-01-13 RX ORDER — ACETAMINOPHEN 325 MG/1
650 TABLET ORAL EVERY 4 HOURS PRN
Status: DISCONTINUED | OUTPATIENT
Start: 2023-01-16 | End: 2023-01-21 | Stop reason: HOSPADM

## 2023-01-13 RX ORDER — BUDESONIDE 0.5 MG/2ML
0.5 INHALANT ORAL 2 TIMES DAILY
Status: DISCONTINUED | OUTPATIENT
Start: 2023-01-13 | End: 2023-01-21 | Stop reason: HOSPADM

## 2023-01-13 RX ORDER — AMOXICILLIN 250 MG
1 CAPSULE ORAL 2 TIMES DAILY
Status: DISCONTINUED | OUTPATIENT
Start: 2023-01-13 | End: 2023-01-19

## 2023-01-13 RX ORDER — PROCHLORPERAZINE MALEATE 5 MG
5 TABLET ORAL EVERY 6 HOURS PRN
Status: DISCONTINUED | OUTPATIENT
Start: 2023-01-13 | End: 2023-01-21 | Stop reason: HOSPADM

## 2023-01-13 RX ORDER — LIDOCAINE 40 MG/G
CREAM TOPICAL
Status: DISCONTINUED | OUTPATIENT
Start: 2023-01-13 | End: 2023-01-13 | Stop reason: HOSPADM

## 2023-01-13 RX ADMIN — SENNOSIDES AND DOCUSATE SODIUM 1 TABLET: 50; 8.6 TABLET ORAL at 23:46

## 2023-01-13 RX ADMIN — LIDOCAINE HYDROCHLORIDE 60 MG: 20 INJECTION, SOLUTION INFILTRATION; PERINEURAL at 19:25

## 2023-01-13 RX ADMIN — SUCCINYLCHOLINE CHLORIDE 60 MG: 20 INJECTION, SOLUTION INTRAMUSCULAR; INTRAVENOUS; PARENTERAL at 19:25

## 2023-01-13 RX ADMIN — TOPICAL ANESTHETIC 2.5 ML: 200 SPRAY DENTAL; PERIODONTAL at 17:52

## 2023-01-13 RX ADMIN — MORPHINE SULFATE 4 MG: 4 INJECTION, SOLUTION INTRAMUSCULAR; INTRAVENOUS at 17:00

## 2023-01-13 RX ADMIN — ACETAMINOPHEN 975 MG: 325 TABLET, FILM COATED ORAL at 23:46

## 2023-01-13 RX ADMIN — METOPROLOL TARTRATE 25 MG: 25 TABLET, FILM COATED ORAL at 23:45

## 2023-01-13 RX ADMIN — Medication 5 MG: at 19:49

## 2023-01-13 RX ADMIN — Medication 2 G: at 19:22

## 2023-01-13 RX ADMIN — SODIUM CHLORIDE: 900 INJECTION INTRAVENOUS at 23:43

## 2023-01-13 RX ADMIN — PROPOFOL 60 MG: 10 INJECTION, EMULSION INTRAVENOUS at 19:25

## 2023-01-13 RX ADMIN — Medication 5 MG: at 19:34

## 2023-01-13 RX ADMIN — SODIUM CHLORIDE, POTASSIUM CHLORIDE, SODIUM LACTATE AND CALCIUM CHLORIDE 500 ML: 600; 310; 30; 20 INJECTION, SOLUTION INTRAVENOUS at 17:19

## 2023-01-13 RX ADMIN — PIPERACILLIN AND TAZOBACTAM 2.25 G: 2; .25 INJECTION, POWDER, FOR SOLUTION INTRAVENOUS at 23:39

## 2023-01-13 RX ADMIN — MORPHINE SULFATE 4 MG: 4 INJECTION, SOLUTION INTRAMUSCULAR; INTRAVENOUS at 17:42

## 2023-01-13 RX ADMIN — ONDANSETRON 4 MG: 2 INJECTION INTRAMUSCULAR; INTRAVENOUS at 16:58

## 2023-01-13 RX ADMIN — DEXAMETHASONE SODIUM PHOSPHATE 4 MG: 4 INJECTION, SOLUTION INTRA-ARTICULAR; INTRALESIONAL; INTRAMUSCULAR; INTRAVENOUS; SOFT TISSUE at 20:00

## 2023-01-13 RX ADMIN — FENTANYL CITRATE 50 MCG: 50 INJECTION, SOLUTION INTRAMUSCULAR; INTRAVENOUS at 19:21

## 2023-01-13 RX ADMIN — LIDOCAINE HYDROCHLORIDE 10 ML: 20 JELLY TOPICAL at 17:52

## 2023-01-13 RX ADMIN — PHENYLEPHRINE HYDROCHLORIDE 100 MCG: 10 INJECTION INTRAVENOUS at 19:25

## 2023-01-13 RX ADMIN — ROCURONIUM BROMIDE 10 MG: 50 INJECTION, SOLUTION INTRAVENOUS at 19:50

## 2023-01-13 RX ADMIN — GLYCOPYRROLATE 0.6 MG: 0.2 INJECTION, SOLUTION INTRAMUSCULAR; INTRAVENOUS at 20:25

## 2023-01-13 RX ADMIN — ROCURONIUM BROMIDE 20 MG: 50 INJECTION, SOLUTION INTRAVENOUS at 19:35

## 2023-01-13 RX ADMIN — PHENYLEPHRINE HYDROCHLORIDE 100 MCG: 10 INJECTION INTRAVENOUS at 19:29

## 2023-01-13 RX ADMIN — OXYMETAZOLINE HYDROCHLORIDE 2 SPRAY: 0.05 SPRAY NASAL at 17:52

## 2023-01-13 RX ADMIN — SODIUM CHLORIDE, POTASSIUM CHLORIDE, SODIUM LACTATE AND CALCIUM CHLORIDE: 600; 310; 30; 20 INJECTION, SOLUTION INTRAVENOUS at 18:53

## 2023-01-13 RX ADMIN — ONDANSETRON 4 MG: 2 INJECTION INTRAMUSCULAR; INTRAVENOUS at 20:00

## 2023-01-13 RX ADMIN — HYDROMORPHONE HYDROCHLORIDE 0.25 MG: 1 INJECTION, SOLUTION INTRAMUSCULAR; INTRAVENOUS; SUBCUTANEOUS at 20:26

## 2023-01-13 RX ADMIN — HYDROMORPHONE HYDROCHLORIDE 0.25 MG: 1 INJECTION, SOLUTION INTRAMUSCULAR; INTRAVENOUS; SUBCUTANEOUS at 19:50

## 2023-01-13 RX ADMIN — NEOSTIGMINE METHYLSULFATE 4 MG: 1 INJECTION, SOLUTION INTRAVENOUS at 20:25

## 2023-01-13 RX ADMIN — PROPOFOL 15 MCG/KG/MIN: 10 INJECTION, EMULSION INTRAVENOUS at 19:52

## 2023-01-13 ASSESSMENT — ACTIVITIES OF DAILY LIVING (ADL)
ADLS_ACUITY_SCORE: 37

## 2023-01-13 ASSESSMENT — ENCOUNTER SYMPTOMS
ABDOMINAL PAIN: 1
ORTHOPNEA: 0
NUMBNESS: 0
SEIZURES: 0
DYSRHYTHMIAS: 1
BACK PAIN: 1
WEAKNESS: 0

## 2023-01-13 NOTE — TELEPHONE ENCOUNTER
Reason for Call:  Same Day Appointment, Requested Provider:  Jennifer Pepper    PCP: Dany Rodriguez    Reason for visit: Got discharged from hospital 01/11/23 - was told to be seen within 5 days from fall    Duration of symptoms: 3 days     Have you been treated for this in the past? Yes    Additional comments: needs before 01/30/23    Can we leave a detailed message on this number? YES    Phone number patient can be reached at: Cell number on file:    Telephone Information:   Mobile [phone]       Best Time: anytime    Call taken on 1/13/2023 at 9:09 AM by Rosemarie Mart

## 2023-01-13 NOTE — ED TRIAGE NOTES
Left lower abdominal pain that is getting worse. Last BM 2 days ago.     Triage Assessment     Row Name 01/13/23 3837       Triage Assessment (Adult)    Airway WDL WDL       Respiratory WDL    Respiratory WDL WDL       Skin Circulation/Temperature WDL    Skin Circulation/Temperature WDL WDL       Cardiac WDL    Cardiac WDL WDL       Peripheral/Neurovascular WDL    Peripheral Neurovascular WDL WDL       Cognitive/Neuro/Behavioral WDL    Cognitive/Neuro/Behavioral WDL WDL

## 2023-01-13 NOTE — ED PROVIDER NOTES
History     Chief Complaint:  Fall and Abdominal Pain     The history is provided by a relative.      Shiraz Ingram is a 88 year old male with history of CKD, hypertension, hyperlipidemia, and atrial fibrillation who presents with abdominal pain. The patient's son reports about a little less than a week ago he fell on ice and was seen here in the emergency department for lower back pain and had a CT scan done. He states recent onset pain of right side of his abdomen. He denies numbness or weakness in bilateral legs. He notes the back pain is in his lower back and denies upper back pain. He had a bowel movement 2 days ago. He was prescribed pain medication which he has not taken. He lives at home with his son.      Independent Historian: His son    Review of External Notes: Family practice note from 12/13/2022 for no review of the patient's health history and current problems    ROS:  Review of Systems   Gastrointestinal: Positive for abdominal pain (Right side).   Musculoskeletal: Positive for back pain (Lower).   Neurological: Negative for weakness and numbness.   All other systems reviewed and are negative.    Allergies:  No Known Allergies      Medications:    Albuterol  ASA 81  Pradaxa  Imdur  Lopressor  Singulair  Nitrostat  Protonix  Zocor  Demadex     Past Medical History:    Anemia  Aortic stenosis  Pacemaker  CKD  Coronary atherosclerosis  Esophageal ulcer  Hyperlipidemia  Hypertension  Osteoarthrosis   Asthma  Atrial fibrillation   Hearing loss     Past Surgical History:    Cardiac surgery  Colonoscopy  Angiogram   Heart cath  Pacemaker  Esophagoscopy  Herniorrhaphy   Laparoscopic cholecystectomy  Orthopedic surgery   Colonoscopy     Family History:    family history includes C.A.D. in his father; CABG in his son; Cancer in his mother; Cerebrovascular Disease in his son; Family History Negative in his child and sister; Heart Disease in his brother.     Social History:   reports that he has never smoked.  He has never used smokeless tobacco. He reports that he does not currently use alcohol. He reports that he does not use drugs.  PCP: Dany Rodriguez     Physical Exam     Patient Vitals for the past 24 hrs:   BP Temp Temp src Pulse Resp SpO2 Weight   01/13/23 1717 135/82 -- -- 74 -- 100 % --   01/13/23 1702 -- -- -- -- -- 93 % --   01/13/23 1700 (!) 185/80 -- -- 70 14 (!) 88 % --   01/13/23 1406 (!) 176/79 98  F (36.7  C) Temporal 65 18 96 % 72.6 kg (160 lb)      Physical Exam  Constitutional: Well developed, nontox appearance  Head: Atraumatic.   Mouth/Throat: Oropharynx is clear and moist.   Neck:  no stridor  Eyes: no scleral icterus  Cardiovascular: RRR, 2+ bilat radial pulses  Pulmonary/Chest: nml resp effort, Clear BS bilat  Abdominal: ND, soft, right lower quadrant abdominal tenderness, no rebound or guarding   Back: (Known) L-spine tenderness  Ext: Warm, well perfused, no edema  Neurological: A&O, symmetric facies, moves ext x4  Skin: Skin is warm and dry.   Psychiatric: Behavior is normal. Thought content normal.   Nursing note and vitals reviewed.    Emergency Department Course     Imaging:  CT Abdomen Pelvis w/o Contrast   Final Result   IMPRESSION:    1.  Small bowel obstruction secondary to bowel-containing left femoral   hernia with CT findings suggestive of incarceration. Recommend   surgical consultation.      SANDRA DEL TORO MD            SYSTEM ID:  [id number]      XR Chest Port 1 View    (Results Pending)      Report per radiology    Laboratory:  Labs Ordered and Resulted from Time of ED Arrival to Time of ED Departure   COMPREHENSIVE METABOLIC PANEL - Abnormal       Result Value    Sodium 140      Potassium 4.7      Chloride 104      Carbon Dioxide (CO2) 28      Anion Gap 8      Urea Nitrogen 48 (*)     Creatinine 1.94 (*)     Calcium 10.1      Glucose 141 (*)     Alkaline Phosphatase 89      AST 24      ALT 24      Protein Total 8.2      Albumin 3.8      Bilirubin Total 0.7      GFR  Estimate 33 (*)    LIPASE - Abnormal    Lipase 67 (*)    CBC WITH PLATELETS AND DIFFERENTIAL - Abnormal    WBC Count 5.5      RBC Count 5.60      Hemoglobin 11.5 (*)     Hematocrit 38.0 (*)     MCV 68 (*)     MCH 20.5 (*)     MCHC 30.3 (*)     RDW 17.7 (*)     Platelet Count 138 (*)     % Neutrophils 82      % Lymphocytes 8      % Monocytes 6      % Eosinophils 3      % Basophils 1      % Immature Granulocytes 0      NRBCs per 100 WBC 0      Absolute Neutrophils 4.5      Absolute Lymphocytes 0.4 (*)     Absolute Monocytes 0.3      Absolute Eosinophils 0.2      Absolute Basophils 0.0      Absolute Immature Granulocytes 0.0      Absolute NRBCs 0.0     LACTIC ACID WHOLE BLOOD - Normal    Lactic Acid 1.3     ROUTINE UA WITH MICROSCOPIC REFLEX TO CULTURE   COVID-19 VIRUS (CORONAVIRUS) BY PCR   ISTAT CREATININE POCT   ABO/RH TYPE AND SCREEN      Emergency Department Course & Assessments:       Interventions:  Medications   lactated ringers BOLUS 500 mL (500 mLs Intravenous New Bag 23 171)   ondansetron (ZOFRAN) injection 4 mg (4 mg Intravenous Given 23 1658)   morphine (PF) injection 4 mg (4 mg Intravenous Given 23 1700)   oxymetazoline (AFRIN) 0.05 % spray 2 spray (has no administration in time range)   lidocaine (XYLOCAINE) 2 % external gel 10 mL (has no administration in time range)   benzocaine 20% (HURRICAINE/TOPEX) 20 % spray 2.5 mL (has no administration in time range)        Consultations/Discussion of Management or Tests:   Discussed plan with patient and patient's son.   Spoke with Dr. Russell, about patient's findings.          Disposition:  The patient was transferred to the OR under the care of Dr. Russell    Impression & Plan      Medical Decision Makin year old male presenting w/  left lower quadrant abdominal pain     Social determinants impacting the patient's health care include: Age, need for , independent living     DDx includes diverticulitis, bowel  obstruction, lumbar radiculopathy, hernia, UTI, ureteral stone, intra-abdominal abscess.  Doubt vascular catastrophe given history and physical exam.  Labs significant for chronically elevated creatinine intervally increased from previous, mild anemia stable, normal lactic acid level.  Imaging sig for bowel obstruction associated with left femoral hernia.  Interventions as noted above with improvement in pain.  Attempted reduction in the emergency department which was unsuccessful after pain med administration and Trendelenburg positioning.  Discussed patient with general surgery who will plan to manage the patient operatively.  Patient and family counseled on all results, disposition and diagnosis via .  They are understanding and agreeable to plan. Patient admitted in guarded condition.      Diagnosis:    ICD-10-CM    1. Incarcerated hernia  K46.0       2. Small bowel obstruction (H)  K56.609          Scribe Disclosure:  IKorina, am serving as a scribe at 3:04 PM on 1/13/2023 to document services personally performed by Danial Barbosa MD based on my observations and the provider's statements to me.     1/13/2023   Danial Barbosa MD Vaughn, Christopher E, MD  01/13/23 7136

## 2023-01-13 NOTE — TELEPHONE ENCOUNTER
"Caller is son ; patient present. State he fell on ice  2 days ago and was seen in ED at [facility] and discharged after evaluation fo back pain   Son reports that today he has lower abdominal pain that is severe causing patient to curse 'and h enever does that\" per son. Patient has vomited.   Denies loss of bowel/bladder control bu thas not had BM in 2 days  Denies numbness in legs and can bear weight but needs ambulatoy assist  Son has assisted patient to car and is heading to ED at [facility] now   Advised to proceed  Meaghan Patterson RN  FNA      Reason for Disposition    Abdominal pain and age > 60 years    Additional Information    Negative: Passed out (i.e., fainted, collapsed and was not responding)    Negative: Shock suspected (e.g., cold/pale/clammy skin, too weak to stand, low BP, rapid pulse)    Negative: Sounds like a life-threatening emergency to the triager    Negative: Major injury to the back (e.g., MVA, fall > 10 feet or 3 meters, penetrating injury, etc.)    Negative: Pain in the upper back over the ribs (rib cage) that radiates (travels) into the chest    Negative: Pain in the upper back over the ribs (rib cage) and worsened by coughing (or clearly increases with breathing)    Negative: Back pain during pregnancy    Negative: SEVERE back pain of sudden onset and age > 60 years    Negative: SEVERE abdominal pain (e.g., excruciating)    Protocols used: BACK PAIN-A-OH      "

## 2023-01-13 NOTE — TELEPHONE ENCOUNTER
Tried calling pt, left VM with  to call back to schedule follow-up.     Any staff that get return call-- ok to schedule pt in Dr. Rodriguez's approval req/ same day slots.    KATHLEEN BravoN RN  [facility]

## 2023-01-13 NOTE — ED TRIAGE NOTES
Patient fell, was told to take Tylenol, presenting now with complaints of severe left sided abdominal pain.

## 2023-01-13 NOTE — ED NOTES
Rapid Assessment Note    History:   Shiraz Ingram is a 88 year old male who presents with abdominal pain. The patient's family member reports about a little less than a week ago he fell on ice and was seen here in the emergency department for lower back pain and had a CT scan done. He states recent onset pain of right side of his abdomen. He denies numbness or weakness in bilateral legs. He notes the back pain is in his lower back and denies upper back pain. He had a bowel movement 2 days ago. He was prescribed pain medication which he has not taken.     Exam:   General:  Alert, interactive, mildly uncomfortable  Cardiovascular:  Well perfused  Lungs:  No respiratory distress, no accessory muscle use  Abdomen: Left lower quadrant abdominal tenderness  Back: L-spine tenderness  Neuro:  Moving all 4 extremities  Skin:  Warm, dry  Psych:  Normal affect    Plan of Care:   I evaluated the patient and developed an initial plan of care. I discussed this plan and explained that I, or one of my partners, would be returning to complete the evaluation.     I, Korina Whitman, am serving as a scribe to document services personally performed by Danial Barbosa MD, based on my observations and the provider's statements to me.    11/5/2018  [employer]    Portions of this medical record were completed by a scribe. UPON MY REVIEW AND AUTHENTICATION BY ELECTRONIC SIGNATURE, this confirms (a) I performed the applicable clinical services, and (b) the record is accurate.        Danial Barbosa MD  01/13/23 5680

## 2023-01-14 ENCOUNTER — OFFICE VISIT (OUTPATIENT)
Dept: INTERPRETER SERVICES | Facility: CLINIC | Age: 88
End: 2023-01-14
Payer: COMMERCIAL

## 2023-01-14 LAB
ANION GAP SERPL CALCULATED.3IONS-SCNC: 8 MMOL/L (ref 3–14)
BASOPHILS # BLD AUTO: 0 10E3/UL (ref 0–0.2)
BASOPHILS NFR BLD AUTO: 0 %
BLD PROD TYP BPU: NORMAL
BLOOD COMPONENT TYPE: NORMAL
BUN SERPL-MCNC: 52 MG/DL (ref 7–30)
CALCIUM SERPL-MCNC: 9.1 MG/DL (ref 8.5–10.1)
CHLORIDE BLD-SCNC: 105 MMOL/L (ref 94–109)
CO2 SERPL-SCNC: 28 MMOL/L (ref 20–32)
CODING SYSTEM: NORMAL
CREAT SERPL-MCNC: 1.94 MG/DL (ref 0.66–1.25)
CROSSMATCH: NORMAL
EOSINOPHIL # BLD AUTO: 0 10E3/UL (ref 0–0.7)
EOSINOPHIL NFR BLD AUTO: 0 %
ERYTHROCYTE [DISTWIDTH] IN BLOOD BY AUTOMATED COUNT: 17.2 % (ref 10–15)
GFR SERPL CREATININE-BSD FRML MDRD: 33 ML/MIN/1.73M2
GLUCOSE BLD-MCNC: 131 MG/DL (ref 70–99)
HCT VFR BLD AUTO: 34.5 % (ref 40–53)
HGB BLD-MCNC: 10.3 G/DL (ref 13.3–17.7)
IMM GRANULOCYTES # BLD: 0 10E3/UL
IMM GRANULOCYTES NFR BLD: 0 %
LYMPHOCYTES # BLD AUTO: 0.2 10E3/UL (ref 0.8–5.3)
LYMPHOCYTES NFR BLD AUTO: 4 %
MCH RBC QN AUTO: 20.9 PG (ref 26.5–33)
MCHC RBC AUTO-ENTMCNC: 29.9 G/DL (ref 31.5–36.5)
MCV RBC AUTO: 70 FL (ref 78–100)
MONOCYTES # BLD AUTO: 0.5 10E3/UL (ref 0–1.3)
MONOCYTES NFR BLD AUTO: 8 %
NEUTROPHILS # BLD AUTO: 5 10E3/UL (ref 1.6–8.3)
NEUTROPHILS NFR BLD AUTO: 88 %
NRBC # BLD AUTO: 0 10E3/UL
NRBC BLD AUTO-RTO: 0 /100
PLATELET # BLD AUTO: 117 10E3/UL (ref 150–450)
POTASSIUM BLD-SCNC: 4.9 MMOL/L (ref 3.4–5.3)
RBC # BLD AUTO: 4.93 10E6/UL (ref 4.4–5.9)
SODIUM SERPL-SCNC: 141 MMOL/L (ref 133–144)
UNIT ABO/RH: NORMAL
UNIT NUMBER: NORMAL
UNIT STATUS: NORMAL
UNIT TYPE ISBT: 6200
WBC # BLD AUTO: 5.7 10E3/UL (ref 4–11)

## 2023-01-14 PROCEDURE — 120N000001 HC R&B MED SURG/OB

## 2023-01-14 PROCEDURE — 999N000157 HC STATISTIC RCP TIME EA 10 MIN

## 2023-01-14 PROCEDURE — 80048 BASIC METABOLIC PNL TOTAL CA: CPT | Performed by: PHYSICIAN ASSISTANT

## 2023-01-14 PROCEDURE — T1013 SIGN LANG/ORAL INTERPRETER: HCPCS | Mod: U3

## 2023-01-14 PROCEDURE — 99222 1ST HOSP IP/OBS MODERATE 55: CPT | Performed by: NURSE PRACTITIONER

## 2023-01-14 PROCEDURE — 85025 COMPLETE CBC W/AUTO DIFF WBC: CPT | Performed by: PHYSICIAN ASSISTANT

## 2023-01-14 PROCEDURE — 250N000009 HC RX 250: Performed by: PHYSICIAN ASSISTANT

## 2023-01-14 PROCEDURE — 999N000128 HC STATISTIC PERIPHERAL IV START W/O US GUIDANCE

## 2023-01-14 PROCEDURE — 258N000003 HC RX IP 258 OP 636: Performed by: NURSE PRACTITIONER

## 2023-01-14 PROCEDURE — 250N000011 HC RX IP 250 OP 636: Performed by: PHYSICIAN ASSISTANT

## 2023-01-14 PROCEDURE — 250N000013 HC RX MED GY IP 250 OP 250 PS 637: Performed by: PHYSICIAN ASSISTANT

## 2023-01-14 PROCEDURE — 94640 AIRWAY INHALATION TREATMENT: CPT | Mod: 76

## 2023-01-14 PROCEDURE — 36415 COLL VENOUS BLD VENIPUNCTURE: CPT | Performed by: PHYSICIAN ASSISTANT

## 2023-01-14 PROCEDURE — 94640 AIRWAY INHALATION TREATMENT: CPT

## 2023-01-14 RX ADMIN — IPRATROPIUM BROMIDE AND ALBUTEROL SULFATE 3 ML: 2.5; .5 SOLUTION RESPIRATORY (INHALATION) at 16:50

## 2023-01-14 RX ADMIN — IPRATROPIUM BROMIDE AND ALBUTEROL SULFATE 3 ML: 2.5; .5 SOLUTION RESPIRATORY (INHALATION) at 11:27

## 2023-01-14 RX ADMIN — SODIUM CHLORIDE 250 ML: 9 INJECTION, SOLUTION INTRAVENOUS at 14:46

## 2023-01-14 RX ADMIN — HYDRALAZINE HYDROCHLORIDE 12.5 MG: 25 TABLET, FILM COATED ORAL at 01:09

## 2023-01-14 RX ADMIN — MONTELUKAST 10 MG: 10 TABLET, FILM COATED ORAL at 11:37

## 2023-01-14 RX ADMIN — ACETAMINOPHEN 975 MG: 325 TABLET, FILM COATED ORAL at 20:18

## 2023-01-14 RX ADMIN — IPRATROPIUM BROMIDE AND ALBUTEROL SULFATE 3 ML: 2.5; .5 SOLUTION RESPIRATORY (INHALATION) at 22:55

## 2023-01-14 RX ADMIN — HYDRALAZINE HYDROCHLORIDE 12.5 MG: 25 TABLET, FILM COATED ORAL at 11:37

## 2023-01-14 RX ADMIN — IPRATROPIUM BROMIDE AND ALBUTEROL SULFATE 3 ML: 2.5; .5 SOLUTION RESPIRATORY (INHALATION) at 07:50

## 2023-01-14 RX ADMIN — ACETAMINOPHEN 975 MG: 325 TABLET, FILM COATED ORAL at 11:35

## 2023-01-14 RX ADMIN — PIPERACILLIN AND TAZOBACTAM 2.25 G: 2; .25 INJECTION, POWDER, FOR SOLUTION INTRAVENOUS at 04:05

## 2023-01-14 RX ADMIN — PANTOPRAZOLE SODIUM 40 MG: 40 TABLET, DELAYED RELEASE ORAL at 06:33

## 2023-01-14 RX ADMIN — BUDESONIDE 0.5 MG: 0.5 INHALANT RESPIRATORY (INHALATION) at 07:50

## 2023-01-14 RX ADMIN — SENNOSIDES AND DOCUSATE SODIUM 1 TABLET: 50; 8.6 TABLET ORAL at 21:25

## 2023-01-14 RX ADMIN — ONDANSETRON 4 MG: 2 INJECTION INTRAMUSCULAR; INTRAVENOUS at 18:01

## 2023-01-14 RX ADMIN — HYDRALAZINE HYDROCHLORIDE 12.5 MG: 25 TABLET, FILM COATED ORAL at 16:22

## 2023-01-14 RX ADMIN — METOPROLOL TARTRATE 25 MG: 25 TABLET, FILM COATED ORAL at 21:25

## 2023-01-14 RX ADMIN — HYDROMORPHONE HYDROCHLORIDE 0.2 MG: 0.2 INJECTION, SOLUTION INTRAMUSCULAR; INTRAVENOUS; SUBCUTANEOUS at 22:51

## 2023-01-14 RX ADMIN — BUDESONIDE 0.5 MG: 0.5 INHALANT RESPIRATORY (INHALATION) at 22:55

## 2023-01-14 RX ADMIN — SENNOSIDES AND DOCUSATE SODIUM 1 TABLET: 50; 8.6 TABLET ORAL at 11:38

## 2023-01-14 RX ADMIN — PIPERACILLIN AND TAZOBACTAM 2.25 G: 2; .25 INJECTION, POWDER, FOR SOLUTION INTRAVENOUS at 17:14

## 2023-01-14 RX ADMIN — PIPERACILLIN AND TAZOBACTAM 2.25 G: 2; .25 INJECTION, POWDER, FOR SOLUTION INTRAVENOUS at 22:52

## 2023-01-14 RX ADMIN — ISOSORBIDE MONONITRATE 30 MG: 30 TABLET, EXTENDED RELEASE ORAL at 11:35

## 2023-01-14 RX ADMIN — PIPERACILLIN AND TAZOBACTAM 2.25 G: 2; .25 INJECTION, POWDER, FOR SOLUTION INTRAVENOUS at 11:28

## 2023-01-14 RX ADMIN — METOPROLOL TARTRATE 25 MG: 25 TABLET, FILM COATED ORAL at 11:38

## 2023-01-14 RX ADMIN — HYDRALAZINE HYDROCHLORIDE 12.5 MG: 25 TABLET, FILM COATED ORAL at 21:25

## 2023-01-14 ASSESSMENT — ACTIVITIES OF DAILY LIVING (ADL)
ADLS_ACUITY_SCORE: 43
ADLS_ACUITY_SCORE: 37
ADLS_ACUITY_SCORE: 37
ADLS_ACUITY_SCORE: 41
ADLS_ACUITY_SCORE: 41
ADLS_ACUITY_SCORE: 43
ADLS_ACUITY_SCORE: 41
ADLS_ACUITY_SCORE: 37
ADLS_ACUITY_SCORE: 41
ADLS_ACUITY_SCORE: 41
ADLS_ACUITY_SCORE: 37
ADLS_ACUITY_SCORE: 41

## 2023-01-14 NOTE — PROGRESS NOTES
Patient seen with  at bedside  Nurse reports that the patient had an episode of emesis.  Feels that this may have been related to taking some oral Protonix that required some applesauce due to swallowing difficulty.  Nasogastric tube remains in place.  Patient reports localized discomfort/pain over his surgical site.  No generalized abdominal pain.  Denies ongoing nausea.  Sitting up in the chair.  No passage of flatus or stool  Afebrile, not tachycardic, normotensive  Abdomen soft, nondistended localized discomfort over the left groin area not unexpected  Labs reviewed, white count normal, hemoglobin acceptable    Assessment/plan: Status post repair of incarcerated left femoral hernia with small bowel resection  Continue NG and n.p.o.  Encouraged ambulation and activity  Awaiting return of bowel function

## 2023-01-14 NOTE — PLAN OF CARE
Goal Outcome Evaluation: A&0x4. VSS on 2L nc. American Sign Language. Denies pain. Incision to L groin CDI. 3+ Edema to BLE. NG tube to LIS, moderate red/brown output. NPO ex meds and ice chips. UP with A1gb walker. Walked in halls x1. Difficulty voiding. Straight cath x1, pt tolerated well. Bruise to buttocks r/t fall. Pt reports back pain from this fall, lidocaine patch in place. Continue to monitor.       Plan of Care Reviewed With: patient    Overall Patient Progress: improvingOverall Patient Progress: improving

## 2023-01-14 NOTE — BRIEF OP NOTE
"[facility]    Brief Operative Note    Pre-operative diagnosis: Unknown cause of injury [X58.XXXA]  Post-operative diagnosis Strangulated right femoral hernia    Procedure: Procedure(s):  LEFT GROIN EXPLORATION, SEGMENTAL SMALL BOWEL RESECTION, TRANSINGUINAL REPAIR OF INCARCERATED/STRANGULATED HERNIA  Surgeon: Surgeon(s) and Role:     * Dwayne Russell MD - Primary     * Deven Osuna PA-C - Assisting  Anesthesia: General   Estimated Blood Loss: None    Drains: None  Specimens:   ID Type Source Tests Collected by Time Destination   1 : INCARCERATED ISCHEMIC SMALL BOWEL Tissue Small Intestine SURGICAL PATHOLOGY EXAM Dwayne Russell MD 1/13/2023  7:59 PM      Findings:   Hyperemic with black nonviable knuckle of bowel herniated through the femoral space. Short segment small bowel resection. Deep, posterior PVP mesh. Anatomic progrip mesh placed anterior to the muscle.  Complications: None.  Implants:   Implant Name Type Inv. Item Serial No.  Lot No. LRB No. Used Action   MESH COMPOSITE W/COLLAGEN 6CM OPEN VENTRAL PARIETEX PCO6VP - [id number] Mesh MESH COMPOSITE W/COLLAGEN 6CM OPEN VENTRAL PARIETEX PCO6VP  COVID[employer]EN [id number] Left 1 Implanted   MESH PROGRIP 4.7X3\" PARIETEX LEFT [id number] - [id number] Mesh MESH PROGRIP 4.7X3\" PARIETEX LEFT [id number]  COVIDIEN [id number] Left 1 Implanted       Deven ADDISON. BON Osuna      "

## 2023-01-14 NOTE — CONSULTS
[facility]  Consult Note - Hospitalist Service  Date of Admission:  1/13/2023  Consult Requested by: General surgery   Reason for Consult: medical management     Assessment & Plan   Shiraz Ingram is a 88 year old male admitted on 1/13/2023 presented with abdomen pain. Patient S/P post op day #1 of Left Groin  Exploration, segmental small bowel resection transinguinal repair of incareated strangled hernia. Blood loss none. PMHx of anemia, benign neoplasm of colon, aortic stenosis, pacemaker, CKD, Coronary atherosclerosis, HLD, HTN, asthma, atrial Fibrillation, and CHF. Consulted for medical management.      at bedside     Abdomen pain   Left Groin  Exploration, segmental small bowel resection transinguinal repair of incareated strangled hernia.  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 Left ventricular diastolic function is  indeterminate. Mid and basal inferior hypokinesis.  -strict I&Os, daily wgt, FR  -held Demadex, ASA, Praxda   -monitor      Atrial Fibrillation  -resume home metoprolol   -Held Praxda (defer general surgery for restart)   -tele monitoring  -EKG as needed    Acute on Chronic Kidney Disease  -trend level   -held diuretic  -avoid nephrotoxin medication   -repeat BMP in am     Thrombocytopenia  -trend level  anticoagulation on hold       HLD  -statin on hold     Asthma  -no active issue  -resume home inhalers  -as needed respiratory tx      anemia   -on admission 11.5  -trend level  -transfuse if hgb drop less than 7  -CBC in am         Hx benign neoplasm of colon      The patient's care was discussed with the   Attending Physician, Dr. Wu .    Clinically Significant Risk Factors Present on Admission               # Drug Induced  Coagulation Defect: home medication list includes an anticoagulant medication  # Thrombocytopenia: Lowest platelets = 117 in last 2 days, will monitor for bleeding   # Hypertension: home medication list includes antihypertensive(s)              Carlitos Marinelli CNP  Hospitalist Service  Securely message with [employer] (more info)  Text page via [facility] Paging/Directory   ______________________________________________________________________    Chief Complaint   Abdomen pain     History is obtained from the  electronic health record    History of Present Illness   Shiraz Ingram is a 88 year old  male  who presented for evaluation due to abdominal pain.  Patient's family member indicated that the patient fell on the ice approximately a week ago and was seen in the emergency department for low back pain.  He had the recent onset of right-sided abdominal pain had some numbness and weakness in his legs.  Presented with these complaints and had not had a bowel movement in the last 2 days.  Developed left lower quadrant tenderness and a painful bulge.  Denied any nausea or vomiting.  Was evaluated in the emergency department including a CT scan of the abdomen and pelvis.  This revealed a bowel containing left inguinal hernia with signs of possible incarceration and subsequent small bowel obstruction.  Attempts to reduce the hernia in the ER were unsuccessful and a general surgery consultation was called.  Patient does have a prior history of an elective right inguinal hernia repair in 2016.  Unclear as to how long this hernia has been present.    Past Medical History    Past Medical History:   Diagnosis Date     Allergic rhinitis, cause unspecified      Anemia, unspecified      Aortic stenosis S/P TAVR      Benign neoplasm of colon 1999    Ademonatous polyp     CHF 2nd to TAVR -- S/P Pacemaker 12/2020     CKD (chronic kidney disease) stage 3, GFR 30-59 ml/min (H) 05/12/2011     Community acquired pneumonia 9/29/2020     Coronary  atherosclerosis of unspecified type of vessel, native or graft     s/p CABG 2002     Esophageal ulcer w UGI bleed 05/24/2020    Had EGD adn caurtery 5/24/20, colonoscopy with diverticulosis then     Hyperlipidemia LDL goal < 100      Hypertension goal BP (blood pressure) < 140/90      Localized osteoarthrosis not specified whether primary or secondary, lower leg     left knee     Mild persistent asthma without complication 1/11/2016     Moderate persistent asthma      Persistent atrial fibrillation (H)      Unspecified hearing loss        Past Surgical History   Past Surgical History:   Procedure Laterality Date     CARDIAC SURGERY       COLONOSCOPY N/A 5/26/2020    Procedure: COLONOSCOPY;  Surgeon: Ignacio Fournier MD;  Location:  GI     CV ANGIOGRAM CORONARY GRAFT N/A 4/24/2020    Procedure: Angiogram Coronary Graft;  Surgeon: Addison Willard MD;  Location:  HEART CARDIAC CATH LAB     CV CORONARY ANGIOGRAM N/A 4/24/2020    Procedure: Coronary Angiogram;  Surgeon: Addison Willard MD;  Location:  HEART CARDIAC CATH LAB     CV RIGHT HEART CATH MEASUREMENTS RECORDED N/A 4/24/2020    Procedure: Right Heart Cath;  Surgeon: Addison Willard MD;  Location:  HEART CARDIAC CATH LAB     CV TRANSCATHETER AORTIC VALVE REPLACEMENT N/A 7/14/2020    Procedure: Transcatheter Aortic Valve Replacement;  Surgeon: Soraida Monet MD;  Location:  HEART CARDIAC CATH LAB     EP PACEMAKER N/A 7/15/2020    Procedure: EP Pacemaker;  Surgeon: Tommie Sauer MD;  Location:  HEART CARDIAC CATH LAB     HC ESOPHAGOSCOPY, DIAGNOSTIC  5/03    Dr. Dow, lower esophageal ring & mild gastritis     HERNIORRHAPHY INGUINAL Right 9/26/2016    Procedure: HERNIORRHAPHY INGUINAL;  Surgeon: Alexis Alexandra MD;  Location: [facility]     LAPAROSCOPIC CHOLECYSTECTOMY  12/03    for biliary sludge     REVERSE ARTHROPLASTY SHOULDER Right 2/25/2022    Procedure: RIGHT REVERSE SHOULDER ARTHROPLASTY, WITH OPEN  REDUCTION INTERNAL FIXATION, WITH NO CUSTOM GUIDE;  Surgeon: Wiley Vargas MD;  Location:  OR     [facility] NONSPECIFIC PROCEDURE  5/02    Coronary artery bypass     [facility]/CAUTERY TUMOR/POLYP/LESION  5/03    Dr. Dow, Q 5 years     ZZ COLONOSCOPY THRU STOMA W BIOPSY/CAUTERY TUMOR/POLYP/LESION  12/199    Dr. Dow, one adenomatous polyp       Medications   I have reviewed this patient's current medications          Physical Exam   Vital Signs: Temp: 98  F (36.7  C) Temp src: Oral BP: (!) 144/53 Pulse: 61   Resp: 18 SpO2: 100 % O2 Device: Nasal cannula Oxygen Delivery: 2 LPM  Weight: 160 lbs 0 oz    Constitutional: no distress, cooperative, alert, oriented, normal mood.  present.  NG tube present.   Cardiovascular: Regular rate and rhythm, no murmurs  Respiratory: clear bilaterally, no crackles, wheezing or rales  Gastrointestinal: Abdomen soft, tenderness LLQ. BS normal. No masses.  Skin: warm, dry, surgical incision CDI.      Medical Decision Making       70 MINUTES SPENT BY ME on the date of service doing chart review, history, exam, documentation & further activities per the note.  MANAGEMENT DISCUSSED with the following over the past 24 hours: Enu and patient        Data     I have personally reviewed the following data over the past 24 hrs:    5.7  \   10.3 (L)   / 117 (L)     141 105 52 (H) /  131 (H)   4.9 28 1.94 (H) \       ALT: 24 AST: 24 AP: 89 TBILI: 0.7   ALB: 3.8 TOT PROTEIN: 8.2 LIPASE: 67 (L)       Procal: N/A CRP: N/A Lactic Acid: 1.3         Imaging results reviewed over the past 24 hrs:   Recent Results (from the past 24 hour(s))   CT Abdomen Pelvis w/o Contrast    Narrative    CT ABDOMEN PELVIS W/O CONTRAST 1/13/2023 3:25 PM    CLINICAL HISTORY: LLQ abd pain  TECHNIQUE: CT scan of the abdomen and pelvis was performed without IV  contrast. Multiplanar reformats were obtained. Dose reduction  techniques were used.  CONTRAST:  None.    COMPARISON: CT 9/28/2020    FINDINGS:   LOWER CHEST: Stable size of right lower lobe pulmonary nodules, not  significantly changed since 2020, no specific follow-up recommended  given long-term size stability. Enlarged heart. Prior aortic valve  replacement and median sternotomy. Pacemaker lead partially  visualized.    HEPATOBILIARY: Cholecystectomy.    PANCREAS: Normal.    SPLEEN: Normal.    ADRENAL GLANDS: Normal.    KIDNEYS/BLADDER: Likely bilateral renal cysts, several of which are  hemorrhagic/proteinaceous, no specific follow-up recommended. No  hydronephrosis. Urinary bladder is unremarkable.    BOWEL: Dilation of proximal and mid small bowel with focal transition  point within a left femoral hernia. There is likely fluid within the  hernia sac with some surrounding fat stranding. Distal small bowel and  colon is decompressed. Unable to evaluate for bowel wall enhancement  without intravenous contrast. There is no pneumatosis or  pneumoperitoneum.    LYMPH NODES: Normal.    VASCULATURE: Severe calcified atherosclerosis. Ectasia of the  infrarenal abdominal aorta without kevin aneurysmal dilation.    PELVIC ORGANS: Prostatomegaly.    OTHER: None.    MUSCULOSKELETAL: No definite acute bony abnormality. L5 compression  deformity is new in comparison to 2020 but appears chronic. Additional  old thoracolumbar compression fractures are not significantly changed.      Impression    IMPRESSION:   1.  Small bowel obstruction secondary to bowel-containing left femoral  hernia with CT findings suggestive of incarceration. Recommend  surgical consultation.    SANDRA DEL TORO MD         SYSTEM ID:  [id number]   XR Chest Port 1 View    Narrative    EXAM: XR CHEST PORT 1 VIEW  LOCATION: [facility]  DATE/TIME: 1/13/2023 6:07 PM    INDICATION: Hypoxia.  COMPARISON: 10/08/2022.      Impression    IMPRESSION: Since 10/08/2022, the prior seen right lung infiltrate and tiny right pleural  effusion have resolved. There is a new NG tube and distal aspect is not included on this study to verify location. There is a new slight left pleural effusion with   minimal underlying infiltrate/atelectasis in the left lower lobe. Otherwise the chest is stable with again seen transvenous pacer of the lead tip. Pulmonary vascularity is upper limits of normal. Partial calcified thoracic aorta. Postoperative changes   right shoulder.

## 2023-01-14 NOTE — ANESTHESIA CARE TRANSFER NOTE
Patient: Shiraz Ingram    Procedure: Procedure(s):  LEFT GROIN EXPLORATION, SEGMENTAL SMALL BOWEL RESECTION, TRANSINGUINAL REPAIR OF INCARCERATED/STRANGULATED HERNIA       Diagnosis: Unknown cause of injury [X58.XXXA]  Diagnosis Additional Information: No value filed.    Anesthesia Type:   General     Note:    Oropharynx: oropharynx clear of all foreign objects  Level of Consciousness: awake  Oxygen Supplementation: face mask  Level of Supplemental Oxygen (L/min / FiO2): 6  Independent Airway: airway patency satisfactory and stable  Dentition: dentition unchanged      Patient transferred to: PACU    Handoff Report: Identifed the Patient, Identified the Reponsible Provider, Reviewed the pertinent medical history, Discussed the surgical course, Reviewed Intra-OP anesthesia mangement and issues during anesthesia, Set expectations for post-procedure period and Allowed opportunity for questions and acknowledgement of understanding      Vitals:  Vitals Value Taken Time   /77 01/13/23 2049   Temp 36.8  C (98.3  F) 01/13/23 2049   Pulse 72 01/13/23 2100   Resp 16 01/13/23 2100   SpO2 92 % 01/13/23 2100   Vitals shown include unvalidated device data.    Electronically Signed By: EVA Rogers CRNA  January 13, 2023  9:01 PM

## 2023-01-14 NOTE — OR NURSING
Report called to [facility] Surg [facility], Vidhi GARCIA.  Pt to room via cart with NA in escort.  All belongings sent with pt.  Family joselitoe notified of transfer.  used in PACU. Sign out by MOSES Joya.

## 2023-01-14 NOTE — PROGRESS NOTES
Surgery      No complaints  NG bothersome  Pain controlled  + flatus  Voiding, decrease output  Walking   Denies CP, dyspnea    Gen:  Awake, Alert, NAD  BP (!) 144/53 (BP Location: Right arm)   Pulse 61   Temp 98  F (36.7  C) (Oral)   Resp 18   Wt 72.6 kg (160 lb)   SpO2 100%   BMI 25.71 kg/m    Resp - Non-labored clear to ascultation.    Cardiac - Irreg irreg, +m  Abdomen - soft, non tender, non distended. Left groin incision healing appropriately without erythema or drainage.  Extremities - no lower extremity edema or tenderness with palpation    NG output 200/ 24  bilious   yesterday. 150cc this morning.    Cre 2.1 (1.94)  GFR 30 (33)  Glu 101  Wbc 4.3  Hgb 8.6 (10.3)    A/P 87 yo man with aortic stenosis, atrial fibrillation, CKD, asthma and chronic anemia s/p emergent open repair of strangulated left femoral hernia with short segment small bowel resection on the evening of 1/13/23.    - NGT clamping trial. Clamp 6 hrs then check residual. If residual is <200cc then remove NG tube. Place NG to back to LIS if any nausea/impending emesis during the clamping trial.  - NPO except meds/sips  - monitor hgb. If stable in the 8's tomorrow, restart anticoagulation for A. Fib. Discussed criteria for transfusion.  - currently on zosyn 2.25 q 6 for bowel resection with mesh herniorrhaphy. Discontinue tomorrow.   - encourage incentive spirometer use  - ambulate  - appreciate hospitalist management of medical issues.  -  scheduled to arrive at 9am Monday.    Deven Osuna PA-C  Office: [phone]  Pager: [phone]

## 2023-01-14 NOTE — CONSULTS
General Surgery Consultation    Shiraz Ingram MRN# [id number]   Age: 88 year old YOB: 1934     Date of Admission:  1/13/2023    Reason for consult: Inguinal hernia  Small bowel obstruction       Requesting physician: Familia                Assessment and Plan:   Assessment:   88-year-old gentleman with multiple complex medical problems most notably heart disease and atrial fibrillation on chronic anticoagulation who presents with incarcerated left inguinal hernia and small bowel obstruction      Plan:   Discussion was undertaken with the patient with  present about the indication for urgent intervention.  Potential need for bowel resection and other potential complications were reviewed.  We will proceed to the OR for emergency repair.             Chief Complaint:   Abdominal pain, left lower quadrant bulge with pain     History is obtained from the patient, electronic health record and emergency department physician         History of Present Illness:   This patient is a 88 year old  male  who presented for evaluation due to abdominal pain.  Patient's family member indicated that the patient fell on the ice approximately a week ago and was seen in the emergency department for low back pain.  He had the recent onset of right-sided abdominal pain had some numbness and weakness in his legs.  Presented with these complaints and had not had a bowel movement in the last 2 days.  Developed left lower quadrant tenderness and a painful bulge.  Denied any nausea or vomiting.  Was evaluated in the emergency department including a CT scan of the abdomen and pelvis.  This revealed a bowel containing left inguinal hernia with signs of possible incarceration and subsequent small bowel obstruction.  Attempts to reduce the hernia in the ER were unsuccessful and a general surgery consultation was called.  Patient does have a prior history of an elective right inguinal hernia repair in 2016.   Unclear as to how long this hernia has been present.          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          Past Surgical History:     Past Surgical History:   Procedure Laterality Date     CARDIAC SURGERY       COLONOSCOPY N/A 5/26/2020    Procedure: COLONOSCOPY;  Surgeon: Ignacio Fournier MD;  Location:  GI     CV ANGIOGRAM CORONARY GRAFT N/A 4/24/2020    Procedure: Angiogram Coronary Graft;  Surgeon: Addison Willard MD;  Location:  HEART CARDIAC CATH LAB     CV CORONARY ANGIOGRAM N/A 4/24/2020    Procedure: Coronary Angiogram;  Surgeon: Addison Willard MD;  Location:  HEART CARDIAC CATH LAB     CV RIGHT HEART CATH MEASUREMENTS RECORDED N/A 4/24/2020    Procedure: Right Heart Cath;  Surgeon: Addison Willard MD;  Location:  HEART CARDIAC CATH LAB     CV TRANSCATHETER AORTIC VALVE REPLACEMENT N/A 7/14/2020    Procedure: Transcatheter Aortic Valve Replacement;  Surgeon: Soraida Monet MD;  Location: [facility] CARDIAC CATH LAB     EP PACEMAKER N/A 7/15/2020    Procedure: EP Pacemaker;  Surgeon: Tommie Sauer MD;  Location:  HEART CARDIAC CATH LAB     HC ESOPHAGOSCOPY, DIAGNOSTIC  5/03    Dr. Dow, lower esophageal ring & mild gastritis     HERNIORRHAPHY  INGUINAL Right 9/26/2016    Procedure: HERNIORRHAPHY INGUINAL;  Surgeon: Alexis Alexandra MD;  Location:  SD     LAPAROSCOPIC CHOLECYSTECTOMY  12/03    for biliary sludge     REVERSE ARTHROPLASTY SHOULDER Right 2/25/2022    Procedure: RIGHT REVERSE SHOULDER ARTHROPLASTY, WITH OPEN REDUCTION INTERNAL FIXATION, WITH NO CUSTOM GUIDE;  Surgeon: Wiley Vargas MD;  Location:  OR     ZC NONSPECIFIC PROCEDURE  5/02    Coronary artery bypass     [facility] COLONOSCOPY THRU STOMA W BIOPSY/CAUTERY TUMOR/POLYP/LESION  5/03    Dr. Dow, Q 5 years     [facility]/CAUTERY TUMOR/POLYP/LESION  12/199    Dr. Dow, one adenomatous polyp           Medications:   No current facility-administered medications on file prior to encounter.  acetaminophen (TYLENOL) 500 MG tablet, Take 1,000 mg by mouth every 8 hours as needed  albuterol (PROAIR HFA/PROVENTIL HFA/VENTOLIN HFA) 108 (90 Base) MCG/ACT inhaler, INHALE 1 TO 2 PUFFS BY MOUTH EVERY 4 TO 6 HOURS AS NEEDED  aspirin (ASA) 81 MG EC tablet, Take 81 mg by mouth daily  budesonide (PULMICORT) 0.5 MG/2ML nebulizer solution, Take 2 mLs (0.5 mg) by nebulization 2 times daily  calcium carbonate 600 mg-vitamin D 400 units (CALTRATE) 600-400 MG-UNIT per tablet, Take 1 tablet by mouth daily  dabigatran ANTICOAGULANT (PRADAXA) 150 MG capsule, Take 1 capsule (150 mg) by mouth 2 times daily  hydrALAZINE (APRESOLINE) 25 MG tablet, Take 0.5 tablets (12.5 mg) by mouth 3 times daily  HYDROcodone-acetaminophen (NORCO) 5-325 MG tablet, Take 1-2 tablets by mouth every 6 hours as needed for breakthrough pain  ipratropium - albuterol 0.5 mg/2.5 mg/3 mL (DUONEB) 0.5-2.5 (3) MG/3ML nebulization, Inhale 1 vial (3 mLs) into the lungs 4 times daily  isosorbide mononitrate (IMDUR) 30 MG 24 hr tablet, Take 1 tablet (30 mg) by mouth daily  magnesium chloride (MAG64) 535 (64 Mg) MG TBEC CR tablet, Take 1 tablet (535 mg) by mouth 2 times daily  melatonin 3 MG tablet, Take 1  tablet (3 mg) by mouth nightly as needed for sleep  metoprolol tartrate (LOPRESSOR) 25 MG tablet, Take 1 tablet (25 mg) by mouth 2 times daily  montelukast (SINGULAIR) 10 MG tablet, Take 1 tablet (10 mg) by mouth daily  multivitamin, therapeutic (THERA-VIT) TABS tablet, Take 1 tablet by mouth 2 times daily  nitroGLYcerin (NITROSTAT) 0.4 MG sublingual tablet, For chest pain place 1 tablet under the tongue every 5 minutes for 3 doses. If symptoms persist 5 minutes after 1st dose call 911.  pantoprazole (PROTONIX) 40 MG EC tablet, TAKE 1 TABLET BEFORE MEALS TWICE A DAY Strength: 40 mg  simvastatin (ZOCOR) 40 MG tablet, Take 1 tablet (40 mg) by mouth At Bedtime  torsemide (DEMADEX) 5 MG tablet, Patient taking 10 mg in the morning. Take additional 5mg if weight gain of 2-3 lbs in 1 day.          Allergies:    No Known Allergies         Social History:   Patient awake and alert, denies tobacco or significant alcohol use          Family History:   The patient has no family history of any bleeding, clotting or anesthesia problems.          Review of Systems:   Ten point Review of Systems is negative other than noted in the HPI          Physical Exam:   Gen:  This is a well developed, well nourished man in no apparent distress.  Blood pressure (!) 132/93, pulse 63, temperature 98  F (36.7  C), temperature source Temporal, resp. rate 14, weight 72.6 kg (160 lb), SpO2 97 %.  HEENT - Normocephalic, atraumatic, mucous membranes moist.  No scleral icterus.  Nasogastric tube in place with bilious output  Neck - supple without masses  Lungs - clear to ascultation.    Heart - Regular rate & rhythm without murmur  Abdomen:   soft, non-distended, tenderness noted in the left lower quadrant and incarcerated left groin hernia palpated, normal bowel sounds   Extremities - warm without edema  Neurologic - nonfocal          Data:   WBC -   WBC   Date Value Ref Range Status   06/07/2021 3.2 (L) 4.0 - 11.0 10e9/L Final     WBC Count   Date  Value Ref Range Status   01/13/2023 5.5 4.0 - 11.0 10e3/uL Final   ], HgB -   Hemoglobin   Date Value Ref Range Status   01/13/2023 11.5 (L) 13.3 - 17.7 g/dL Final   06/07/2021 10.3 (L) 13.3 - 17.7 g/dL Final   ]   Liver Function Studies -   Recent Labs   Lab Test 01/13/23  1415   PROTTOTAL 8.2   ALBUMIN 3.8   BILITOTAL 0.7   ALKPHOS 89   AST 24   ALT 24     CT scan of the abdomen:   Personally reviewed   Ultrasound of the abdomen:     Dwayne Russell MD

## 2023-01-14 NOTE — OP NOTE
PREOPERATIVE DIAGNOSIS: Incarcerated left groin hernia with small bowel obstruction.   POSTOPERATIVE DIAGNOSIS: Incarcerated/strangulated left femoral hernia with ischemic segment of small bowel  PROCEDURE: Left groin exploration, transinguinal repair of incarcerated/strangulated left femoral hernia with mesh, segmental small bowel resection  SURGEON: Dwayne Russell MD   ASSISTANT: Deven Osuna PA-C, Physician assistant first assistant was necessary during this procedure due to expertise in patient positioning, prepping, incisional opening, retraction, exposure, and suctioning.  ANESTHESIA: General   ESTIMATED BLOOD LOSS: 10 mL.   DRAINS: None.   COMPLICATIONS: None.   SPECIMENS: Segmental small bowel resection  OPERATIVE INDICATIONS: This patient is a chronically anticoagulated 88-year-old gentleman who presented to the emergency department with an incarcerated left groin hernia and resultant small bowel obstruction.  This was not reducible in the ER and the hernia had been incarcerated for at least 24 hours.  I met with the patient in the preoperative area.  The indication for surgery was thoroughly discussed.  The potential risks including but not limited to bleeding, infection, neurologic or vascular injury to the vas deferens or testicle, chronic pain, prosthetic infection, visceral injury were all reviewed in great detail.  Questions were answered and the patient consented to proceed  DESCRIPTION OF PROCEDURE: After informed consent was obtained, the patient was taken to the operating room and placed supine on the operating table. Following the induction of adequate general anesthetic, the patient's Left  groin was shaved, prepped and draped in the usual fashion. A timeout was then completed and IV antibiotics were administered. A standard groin incision was then made and dissection was carried down to the external oblique fascia. This was sharply incised and the inguinal canal was exposed.  With the  inguinal canal exposed, the spermatic cord and its contents were mobilized and encircled with a Penrose drain.  It was at this time that we could see if the incarcerated hernia.  This was below the inguinal ligament.  I split the inguinal ligament using electrocautery.  This gave good mobility of the hernia sac and its contents.  The hernia sac was subsequently opened.  There was some fluid within the sac.  The small bowel that was contained within the hernia sac had a very short segment of ischemic/dead bowel.  This was literally a small knuckle of bowel but enough that it required resection.  The bowel was brought out through the hernia sac and a side-to-side functional end-to-end anastomosis was created using 2 separate firings of the 60 mm endoscopic stapler utilizing white loads.  The second firing completed the very short segment resection and the mesentery to this portion of the bowel was tied off with a 2-0 Vicryl tie.  The minimal mesenteric defect was closed using a 3-0 Vicryl suture.  The bowel was then reduced back into the abdominal cavity and the hernia sac was sutured closed.  Additional preperitoneal dissection was then performed through the floor of the inguinal canal.  This dissection was carried down well below Ross's ligament to incorporate reduction and ultimate repair of the femoral hernia.  Pco 6VP was then introduced and deployed within the preperitoneal space.  This covered up the femoral defect covering well below Ross's ligament and up anteriorly to the arch of the internal oblique and transversus abdominis.  The inguinal floor/femoral canal was then sutured closed in a Jasson type technique with interrupted 0 Vicryl suture.  The inguinal canal and the hernia repair was then further reinforced anteriorly utilizing a 12 x 8 cm anatomic ProGrip mesh that had been fashioned to fit within the space.  The flap of the anatomic mesh was brought around the spermatic cord. The lateral tail of  the mesh was placed up and underneath the fascia of the external oblique.  The spermatic cord was then released and the fascia of the external oblique was closed using running 0 Vicryl stitch. The operative area was infiltrated with  local anesthetic. The deep subcutaneous was closed with 3-0 Vicryl stitch. Skin incision was closed with subcuticular 4-0 Monocryl stitch.  Steri-Strips were applied. Needle and sponge counts were correct. The patient tolerated this well. He was awakened in the operating room and taken to recovery room in stable condition.   BAL MUSE MD

## 2023-01-14 NOTE — ANESTHESIA POSTPROCEDURE EVALUATION
Patient: Shiraz Ingram    Procedure: Procedure(s):  LEFT GROIN EXPLORATION, SEGMENTAL SMALL BOWEL RESECTION, TRANSINGUINAL REPAIR OF INCARCERATED/STRANGULATED HERNIA       Anesthesia Type:  General    Note:  Disposition: Inpatient   Postop Pain Control: Uneventful            Sign Out: Well controlled pain   PONV: No   Neuro/Psych: Uneventful            Sign Out: Acceptable/Baseline neuro status   Airway/Respiratory: Uneventful            Sign Out: Acceptable/Baseline resp. status   CV/Hemodynamics: Uneventful            Sign Out: Acceptable CV status   Other NRE:    DID A NON-ROUTINE EVENT OCCUR? No           Last vitals:  Vitals Value Taken Time   /73 01/13/23 2130   Temp 36.8  C (98.3  F) 01/13/23 2049   Pulse 63 01/13/23 2137   Resp 25 01/13/23 2137   SpO2 97 % 01/13/23 2136   Vitals shown include unvalidated device data.    Electronically Signed By: Kylah Joya  January 13, 2023  11:13 PM

## 2023-01-14 NOTE — SIGNIFICANT EVENT
Significant Event Note    Time of event: 2:37 PM January 14, 2023    Description of event:  No void since straight cath at 0430 bladder scan showed 260    Plan:  Normal saline bolus of 250cc    Discussed with: bedside nurse    Carlitos Marinelli, CNP

## 2023-01-14 NOTE — ANESTHESIA PROCEDURE NOTES
Airway       Patient location during procedure: OR  Staff -        CRNA: Julianna Mauro APRN CRNA       Performed By: CRNA  Consent for Airway        Urgency: elective  Indications and Patient Condition       Indications for airway management: amanda-procedural       Induction type:RSI       Mask difficulty assessment: 0 - not attempted    Final Airway Details       Final airway type: endotracheal airway       Successful airway: ETT - single  Endotracheal Airway Details        ETT size (mm): 8.0       Cuffed: yes       Successful intubation technique: video laryngoscopy       VL Blade Size: Glidescope 3       Grade View of Cords: 1       Adjucts: stylet       Position: Right       Measured from: gums/teeth       Secured at (cm): 22       Bite block used: None    Post intubation assessment        Placement verified by: capnometry, equal breath sounds and chest rise        Number of attempts at approach: 1       Secured with: pink tape       Ease of procedure: easy       Dentition: Intact and Unchanged

## 2023-01-15 ENCOUNTER — OFFICE VISIT (OUTPATIENT)
Dept: INTERPRETER SERVICES | Facility: CLINIC | Age: 88
End: 2023-01-15
Payer: COMMERCIAL

## 2023-01-15 LAB
ANION GAP SERPL CALCULATED.3IONS-SCNC: 8 MMOL/L (ref 3–14)
BASOPHILS # BLD AUTO: 0 10E3/UL (ref 0–0.2)
BASOPHILS NFR BLD AUTO: 1 %
BUN SERPL-MCNC: 73 MG/DL (ref 7–30)
CALCIUM SERPL-MCNC: 8.7 MG/DL (ref 8.5–10.1)
CHLORIDE BLD-SCNC: 106 MMOL/L (ref 94–109)
CO2 SERPL-SCNC: 26 MMOL/L (ref 20–32)
CREAT SERPL-MCNC: 2.1 MG/DL (ref 0.66–1.25)
EOSINOPHIL # BLD AUTO: 0 10E3/UL (ref 0–0.7)
EOSINOPHIL NFR BLD AUTO: 1 %
ERYTHROCYTE [DISTWIDTH] IN BLOOD BY AUTOMATED COUNT: 17.2 % (ref 10–15)
GFR SERPL CREATININE-BSD FRML MDRD: 30 ML/MIN/1.73M2
GLUCOSE BLD-MCNC: 101 MG/DL (ref 70–99)
GLUCOSE BLDC GLUCOMTR-MCNC: 109 MG/DL (ref 70–99)
HCT VFR BLD AUTO: 28.5 % (ref 40–53)
HGB BLD-MCNC: 8.6 G/DL (ref 13.3–17.7)
IMM GRANULOCYTES # BLD: 0 10E3/UL
IMM GRANULOCYTES NFR BLD: 1 %
LYMPHOCYTES # BLD AUTO: 0.5 10E3/UL (ref 0.8–5.3)
LYMPHOCYTES NFR BLD AUTO: 12 %
MCH RBC QN AUTO: 20.5 PG (ref 26.5–33)
MCHC RBC AUTO-ENTMCNC: 30.2 G/DL (ref 31.5–36.5)
MCV RBC AUTO: 68 FL (ref 78–100)
MONOCYTES # BLD AUTO: 0.5 10E3/UL (ref 0–1.3)
MONOCYTES NFR BLD AUTO: 11 %
NEUTROPHILS # BLD AUTO: 3.3 10E3/UL (ref 1.6–8.3)
NEUTROPHILS NFR BLD AUTO: 74 %
NRBC # BLD AUTO: 0 10E3/UL
NRBC BLD AUTO-RTO: 0 /100
PLATELET # BLD AUTO: 114 10E3/UL (ref 150–450)
POTASSIUM BLD-SCNC: 4.8 MMOL/L (ref 3.4–5.3)
RBC # BLD AUTO: 4.2 10E6/UL (ref 4.4–5.9)
SODIUM SERPL-SCNC: 140 MMOL/L (ref 133–144)
WBC # BLD AUTO: 4.3 10E3/UL (ref 4–11)

## 2023-01-15 PROCEDURE — 36415 COLL VENOUS BLD VENIPUNCTURE: CPT | Performed by: NURSE PRACTITIONER

## 2023-01-15 PROCEDURE — 80048 BASIC METABOLIC PNL TOTAL CA: CPT | Performed by: NURSE PRACTITIONER

## 2023-01-15 PROCEDURE — T1013 SIGN LANG/ORAL INTERPRETER: HCPCS | Mod: U3

## 2023-01-15 PROCEDURE — 99232 SBSQ HOSP IP/OBS MODERATE 35: CPT | Performed by: HOSPITALIST

## 2023-01-15 PROCEDURE — 94640 AIRWAY INHALATION TREATMENT: CPT

## 2023-01-15 PROCEDURE — 250N000011 HC RX IP 250 OP 636: Performed by: PHYSICIAN ASSISTANT

## 2023-01-15 PROCEDURE — 94640 AIRWAY INHALATION TREATMENT: CPT | Mod: 76

## 2023-01-15 PROCEDURE — 999N000157 HC STATISTIC RCP TIME EA 10 MIN

## 2023-01-15 PROCEDURE — 85025 COMPLETE CBC W/AUTO DIFF WBC: CPT | Performed by: NURSE PRACTITIONER

## 2023-01-15 PROCEDURE — 250N000013 HC RX MED GY IP 250 OP 250 PS 637: Performed by: PHYSICIAN ASSISTANT

## 2023-01-15 PROCEDURE — 250N000009 HC RX 250: Performed by: PHYSICIAN ASSISTANT

## 2023-01-15 PROCEDURE — 120N000001 HC R&B MED SURG/OB

## 2023-01-15 RX ORDER — CYCLOBENZAPRINE HCL 10 MG
10 TABLET ORAL EVERY 8 HOURS PRN
Status: DISCONTINUED | OUTPATIENT
Start: 2023-01-15 | End: 2023-01-15

## 2023-01-15 RX ORDER — CYCLOBENZAPRINE HCL 5 MG
5 TABLET ORAL EVERY 8 HOURS PRN
Status: DISCONTINUED | OUTPATIENT
Start: 2023-01-15 | End: 2023-01-21 | Stop reason: HOSPADM

## 2023-01-15 RX ADMIN — IPRATROPIUM BROMIDE AND ALBUTEROL SULFATE 3 ML: 2.5; .5 SOLUTION RESPIRATORY (INHALATION) at 19:21

## 2023-01-15 RX ADMIN — SENNOSIDES AND DOCUSATE SODIUM 1 TABLET: 50; 8.6 TABLET ORAL at 08:17

## 2023-01-15 RX ADMIN — OXYCODONE HYDROCHLORIDE 5 MG: 5 TABLET ORAL at 02:10

## 2023-01-15 RX ADMIN — OXYCODONE HYDROCHLORIDE 5 MG: 5 TABLET ORAL at 06:31

## 2023-01-15 RX ADMIN — METOPROLOL TARTRATE 25 MG: 25 TABLET, FILM COATED ORAL at 21:42

## 2023-01-15 RX ADMIN — IPRATROPIUM BROMIDE AND ALBUTEROL SULFATE 3 ML: 2.5; .5 SOLUTION RESPIRATORY (INHALATION) at 11:29

## 2023-01-15 RX ADMIN — PIPERACILLIN AND TAZOBACTAM 2.25 G: 2; .25 INJECTION, POWDER, FOR SOLUTION INTRAVENOUS at 16:43

## 2023-01-15 RX ADMIN — HYDRALAZINE HYDROCHLORIDE 12.5 MG: 25 TABLET, FILM COATED ORAL at 16:42

## 2023-01-15 RX ADMIN — MONTELUKAST 10 MG: 10 TABLET, FILM COATED ORAL at 08:17

## 2023-01-15 RX ADMIN — IPRATROPIUM BROMIDE AND ALBUTEROL SULFATE 3 ML: 2.5; .5 SOLUTION RESPIRATORY (INHALATION) at 15:34

## 2023-01-15 RX ADMIN — ACETAMINOPHEN 975 MG: 325 TABLET, FILM COATED ORAL at 11:57

## 2023-01-15 RX ADMIN — OXYCODONE HYDROCHLORIDE 5 MG: 5 TABLET ORAL at 21:42

## 2023-01-15 RX ADMIN — IPRATROPIUM BROMIDE AND ALBUTEROL SULFATE 3 ML: 2.5; .5 SOLUTION RESPIRATORY (INHALATION) at 08:13

## 2023-01-15 RX ADMIN — BUDESONIDE 0.5 MG: 0.5 INHALANT RESPIRATORY (INHALATION) at 08:13

## 2023-01-15 RX ADMIN — POLYETHYLENE GLYCOL 3350 17 G: 17 POWDER, FOR SOLUTION ORAL at 08:17

## 2023-01-15 RX ADMIN — HYDRALAZINE HYDROCHLORIDE 12.5 MG: 25 TABLET, FILM COATED ORAL at 21:42

## 2023-01-15 RX ADMIN — PIPERACILLIN AND TAZOBACTAM 2.25 G: 2; .25 INJECTION, POWDER, FOR SOLUTION INTRAVENOUS at 04:58

## 2023-01-15 RX ADMIN — HYDRALAZINE HYDROCHLORIDE 12.5 MG: 25 TABLET, FILM COATED ORAL at 08:17

## 2023-01-15 RX ADMIN — CYCLOBENZAPRINE 5 MG: 5 TABLET, FILM COATED ORAL at 12:10

## 2023-01-15 RX ADMIN — METOPROLOL TARTRATE 25 MG: 25 TABLET, FILM COATED ORAL at 08:17

## 2023-01-15 RX ADMIN — PIPERACILLIN AND TAZOBACTAM 2.25 G: 2; .25 INJECTION, POWDER, FOR SOLUTION INTRAVENOUS at 11:58

## 2023-01-15 RX ADMIN — ISOSORBIDE MONONITRATE 30 MG: 30 TABLET, EXTENDED RELEASE ORAL at 08:17

## 2023-01-15 RX ADMIN — ACETAMINOPHEN 975 MG: 325 TABLET, FILM COATED ORAL at 02:10

## 2023-01-15 RX ADMIN — SENNOSIDES AND DOCUSATE SODIUM 1 TABLET: 50; 8.6 TABLET ORAL at 21:42

## 2023-01-15 RX ADMIN — ACETAMINOPHEN 975 MG: 325 TABLET, FILM COATED ORAL at 18:47

## 2023-01-15 RX ADMIN — PANTOPRAZOLE SODIUM 40 MG: 40 TABLET, DELAYED RELEASE ORAL at 06:27

## 2023-01-15 ASSESSMENT — ACTIVITIES OF DAILY LIVING (ADL)
ADLS_ACUITY_SCORE: 43

## 2023-01-15 NOTE — PLAN OF CARE
Goal Outcome Evaluation:    Pt is A & O x 4. Lungs sound clear, bowel sounds hypoactive, nauseated and had emesis. Received IV zofran. Up with 1, gait belt and walker. SL. NPO except for ice chips and meds. Received 250cc NS bolus for unable to void, voided 100cc, PVR was 232cc. On IV antibiotics. NG in place with landmark at 71 to LIS, with greenish brown output. Did not tolerate ambulation. Bruise from previous fall on coccyx, bruisu=ing around the dressing on the LLQ of abdomen. Denies pain. Tele was Afib with CVR and BBB. Will continue to monitor.

## 2023-01-15 NOTE — PHARMACY-ADMISSION MEDICATION HISTORY
Pharmacy Medication History  Admission medication history interview status for the 1/13/2023  admission is complete. See EPIC admission navigator for prior to admission medications     Location of Interview: Patient room  Medication history sources: Patient and Surescripts    Significant changes made to the medication list:    Removed:  Norco  Melatonin     Adjusted dosing:  Aspirin 81 mg changed from once daily to twice daily  Torsemide 5 mg changed from 10 mg in the morning to 5 mg BID    In the past week, patient estimated taking medication this percent of the time: greater than 90%    Additional medication history information:   I verified the medication list with the patient and [employer]. A sign language interpretor was present.   Regarding the torsemide, the patient says he contacts his doctor if his weight increases by 2-3 pounds in a day. If his weight stays the same or decreases he stays on his current dose.     Medication reconciliation completed by provider prior to medication history? No    Time spent in this activity: 20 minutes    Prior to Admission medications    Medication Sig Last Dose Taking? Auth Provider Long Term End Date   acetaminophen (TYLENOL) 500 MG tablet Take 1,000 mg by mouth every 8 hours as needed  at prn Yes Reported, Patient     albuterol (PROAIR HFA/PROVENTIL HFA/VENTOLIN HFA) 108 (90 Base) MCG/ACT inhaler INHALE 1 TO 2 PUFFS BY MOUTH EVERY 4 TO 6 HOURS AS NEEDED  at prn Yes Ty Wing, DO Yes    aspirin (ASA) 81 MG EC tablet Take 81 mg by mouth 2 times daily 1/13/2023 at am Yes Reported, Patient No    budesonide (PULMICORT) 0.5 MG/2ML nebulizer solution Take 2 mLs (0.5 mg) by nebulization 2 times daily 1/13/2023 at am Yes Dany Rodriguez MD Yes    calcium carbonate 600 mg-vitamin D 400 units (CALTRATE) 600-400 MG-UNIT per tablet Take 1 tablet by mouth daily 1/13/2023 at am Yes Unknown, Entered By History     dabigatran ANTICOAGULANT (PRADAXA) 150 MG capsule Take 1  capsule (150 mg) by mouth 2 times daily 1/13/2023 at am Yes Jaxon Field MD     hydrALAZINE (APRESOLINE) 25 MG tablet Take 0.5 tablets (12.5 mg) by mouth 3 times daily  Patient taking differently: Take 12.5 mg by mouth 4 times daily 1/13/2023 at am Yes Jaxon Field MD Yes    ipratropium - albuterol 0.5 mg/2.5 mg/3 mL (DUONEB) 0.5-2.5 (3) MG/3ML nebulization Inhale 1 vial (3 mLs) into the lungs 4 times daily 1/13/2023 at am Yes Dany Rodriguez MD Yes    isosorbide mononitrate (IMDUR) 30 MG 24 hr tablet Take 1 tablet (30 mg) by mouth daily 1/13/2023 at am Yes Jaxon Field MD Yes    magnesium chloride (MAG64) 535 (64 Mg) MG TBEC CR tablet Take 1 tablet (535 mg) by mouth 2 times daily 1/13/2023 at am Yes Gustavo Tierney MD     metoprolol tartrate (LOPRESSOR) 25 MG tablet Take 1 tablet (25 mg) by mouth 2 times daily 1/13/2023 at am Yes Dany Rodriguez MD Yes    montelukast (SINGULAIR) 10 MG tablet Take 1 tablet (10 mg) by mouth daily 1/12/2023 at pm Yes Dany Rodriguez MD Yes    multivitamin, therapeutic (THERA-VIT) TABS tablet Take 1 tablet by mouth 2 times daily 1/13/2023 at am Yes Unknown, Entered By History     nitroGLYcerin (NITROSTAT) 0.4 MG sublingual tablet For chest pain place 1 tablet under the tongue every 5 minutes for 3 doses. If symptoms persist 5 minutes after 1st dose call 911.  at prn Yes Bozena Groves MD Yes    pantoprazole (PROTONIX) 40 MG EC tablet TAKE 1 TABLET BEFORE MEALS TWICE A DAY Strength: 40 mg 1/13/2023 at am Yes Dany Rodriguez MD     simvastatin (ZOCOR) 40 MG tablet Take 1 tablet (40 mg) by mouth At Bedtime 1/12/2023 Yes Amee Rocha PA-C Yes    torsemide (DEMADEX) 5 MG tablet Patient taking 10 mg in the morning. Take additional 5mg if weight gain of 2-3 lbs in 1 day.  Patient taking differently: Patient taking 10 mg two times daily. Take additional 5mg if weight gain of 2-3 lbs in 1 day.  Yes Dany Rodriguez MD  Yes        The information provided in this note is only as accurate as the sources available at the time of update(s)   Karli Spann  Pharmacy Intern

## 2023-01-15 NOTE — PLAN OF CARE
Goal Outcome Evaluation:  A&0x4. VSS on 2L nc. American Sign Language. Incision to L groin CDI.  Pain managed Dilaudid x1 & oxy x 2. 3+ Edema to BLE. NG tube to LIS, 200cc of green/brown output. NPO ex meds and ice chips. UP with A1gb walker. Encouraged ambulation, pt declined as he was concerned with nausea. Writer educated on pain management to ensure he is able to walk today. Denies nausea. BS normoactive. Abd tender to LLQ. Voiding in urinal. PVR WNL, however still lower urine output. Continue to monitor.        Plan of Care Reviewed With: patient    Overall Patient Progress: improvingOverall Patient Progress: improving

## 2023-01-15 NOTE — PROGRESS NOTES
[facility]    Hospitalist Progress Note    Date of Admission:  1/13/2023    Assessment & Plan     Shiraz Ingram is a 88 year old male admitted on 1/13/2023  who presented with abdomen pain. Patient is S/P repair of incarcerated left femoral hernia with small bowel resection on 1/13/23 .PMHx of anemia, benign neoplasm of colon, aortic stenosis, pacemaker, CKD, Coronary atherosclerosis, HLD, HTN, asthma, atrial Fibrillation, and CHF. Consulted for medical management.      Acute abdomen pain   S/P repair of incarcerated left femoral hernia with small bowel resection on 1/13/23  -defer postop management to general surgery including pain control, diet, antibiotics  -Awaiting return of bowel function     Aortic stenosis S/P TAVR  Coronary atherosclerosis S/P CABG 2002  Chronic systolic and diastolic CHF  S/p pacemaker  HTN  Mild pulmonary hypertension  Last echo 6/20/22 EF 45-50%. Left ventricular diastolic function is  indeterminate. Mid and basal inferior hypokinesis.  -strict I&Os, daily wgt  -Telemetry  -resume home hydralazine, metoprolol, Imdur  -Holding PTA torsemide  -monitor       Atrial Fibrillation  -resume home metoprolol   -Hold dabigatran (defer to general surgery for restart)   -tele monitoring     CKD 3  Baseline creatinine appears to be between 1.5-1.9.  GFR in the 30s/40s range.  Currently creatinine appears mildly higher than baseline at 2.1.  -trend level   -hold diuretic  -avoid nephrotoxin medication      Chronic thrombocytopenia  PC was in normal range end of last year but he has had history of thrombocytopenia.  Currently in the low 100s.  -Continue to monitor.  -Dabigatran on hold     HLD  -statin on hold while n.p.o.    Hypoxia  Has been on 2 L NC O2 postop.  Likely atelectasis.  Chest x-ray with no acute findings.  -Encourage I-S     Asthma  -no active issue  -resume home inhalers  -as needed respiratory tx     Anemia of chronic disease  -on admission 11.5.  Baseline Hb  in the 10s range.  -trend level  -transfuse if hgb drop less than 7     Hx benign neoplasm of colon  -Noted    H/o esophageal ulcer  GERD  -Continue PTA PPI    Medical Decision Making               Clinically Significant Risk Factors                # Thrombocytopenia: Lowest platelets = 114 in last 2 days, will monitor for bleeding                   Regi Gonzalez MD  Text Page (7am - 6pm, M-F)  [facility]  Securely message with the Vocera Web Console (learn more here)  Text page via [employer] Paging/Directory      Interval History   Patient seen with help of .  Patient is doing well, denies any nausea, abdominal pain.  He was sitting up in the chair.  No shortness of breath, chest pain, fever.    -Data reviewed today: I reviewed all new labs and imaging results over the last 24 hours. I personally reviewed labs.    Physical Exam   Temp: 98.2  F (36.8  C) Temp src: Axillary BP: 130/63 Pulse: 72   Resp: 17 SpO2: 98 % O2 Device: Nasal cannula Oxygen Delivery: 2 LPM  Vitals:    01/13/23 1406 01/15/23 0524   Weight: 72.6 kg (160 lb) 68.9 kg (152 lb)     Vital Signs with Ranges  Temp:  [98.2  F (36.8  C)-98.4  F (36.9  C)] 98.2  F (36.8  C)  Pulse:  [61-72] 72  Resp:  [17-18] 17  BP: (130-144)/(58-65) 130/63  SpO2:  [95 %-100 %] 98 %  I/O last 3 completed shifts:  In: -   Out: 650 [Urine:450; Emesis/NG output:200]    Constitutional: Alert, appears comfortable, in no acute distress, NG tube in place  Respiratory: Non labored breathing, clear to auscultation bilaterally  Cardiovascular: RRR, no significant edema  Skin/Integumen: no rashes, no pressure sores  Neuro: alert, converses appropriately with sign language  Psych: mood and affect appropriate      Medications       acetaminophen  975 mg Oral Q8H     budesonide  0.5 mg Nebulization BID     hydrALAZINE  12.5 mg Oral TID     ipratropium - albuterol 0.5 mg/2.5 mg/3 mL  3 mL Nebulization 4x daily     isosorbide mononitrate   30 mg Oral Daily     metoprolol tartrate  25 mg Oral BID     montelukast  10 mg Oral Daily     pantoprazole  40 mg Oral QAM AC     piperacillin-tazobactam  2.25 g Intravenous Q6H     polyethylene glycol  17 g Oral Daily     senna-docusate  1 tablet Oral BID     sodium chloride (PF)  3 mL Intracatheter Q8H     [Held by provider] torsemide  10 mg Oral Daily       Data   Recent Labs   Lab 01/15/23  0650 01/14/23  0703 01/13/23  1415   WBC 4.3 5.7 5.5   HGB 8.6* 10.3* 11.5*   MCV 68* 70* 68*   * 117* 138*    141 140   POTASSIUM 4.8 4.9 4.7   CHLORIDE 106 105 104   CO2 26 28 28   BUN 73* 52* 48*   CR 2.10* 1.94* 1.94*   ANIONGAP 8 8 8   AYALA 8.7 9.1 10.1   * 131* 141*   ALBUMIN  --   --  3.8   PROTTOTAL  --   --  8.2   BILITOTAL  --   --  0.7   ALKPHOS  --   --  89   ALT  --   --  24   AST  --   --  24   LIPASE  --   --  67*       Imaging  No results found for this or any previous visit (from the past 24 hour(s)).

## 2023-01-16 ENCOUNTER — OFFICE VISIT (OUTPATIENT)
Dept: INTERPRETER SERVICES | Facility: CLINIC | Age: 88
End: 2023-01-16
Payer: COMMERCIAL

## 2023-01-16 LAB
ANION GAP SERPL CALCULATED.3IONS-SCNC: 5 MMOL/L (ref 3–14)
BUN SERPL-MCNC: 77 MG/DL (ref 7–30)
CALCIUM SERPL-MCNC: 8.9 MG/DL (ref 8.5–10.1)
CHLORIDE BLD-SCNC: 108 MMOL/L (ref 94–109)
CO2 SERPL-SCNC: 27 MMOL/L (ref 20–32)
CREAT SERPL-MCNC: 1.88 MG/DL (ref 0.66–1.25)
ERYTHROCYTE [DISTWIDTH] IN BLOOD BY AUTOMATED COUNT: 17.2 % (ref 10–15)
GFR SERPL CREATININE-BSD FRML MDRD: 34 ML/MIN/1.73M2
GLUCOSE BLD-MCNC: 87 MG/DL (ref 70–99)
HCT VFR BLD AUTO: 27.2 % (ref 40–53)
HGB BLD-MCNC: 8.1 G/DL (ref 13.3–17.7)
MCH RBC QN AUTO: 20.4 PG (ref 26.5–33)
MCHC RBC AUTO-ENTMCNC: 29.8 G/DL (ref 31.5–36.5)
MCV RBC AUTO: 69 FL (ref 78–100)
PLATELET # BLD AUTO: 119 10E3/UL (ref 150–450)
POTASSIUM BLD-SCNC: 4.9 MMOL/L (ref 3.4–5.3)
RBC # BLD AUTO: 3.97 10E6/UL (ref 4.4–5.9)
SODIUM SERPL-SCNC: 140 MMOL/L (ref 133–144)
WBC # BLD AUTO: 4.1 10E3/UL (ref 4–11)

## 2023-01-16 PROCEDURE — 94640 AIRWAY INHALATION TREATMENT: CPT

## 2023-01-16 PROCEDURE — 99232 SBSQ HOSP IP/OBS MODERATE 35: CPT | Performed by: HOSPITALIST

## 2023-01-16 PROCEDURE — 250N000009 HC RX 250: Performed by: PHYSICIAN ASSISTANT

## 2023-01-16 PROCEDURE — T1013 SIGN LANG/ORAL INTERPRETER: HCPCS | Mod: U3

## 2023-01-16 PROCEDURE — 85027 COMPLETE CBC AUTOMATED: CPT | Performed by: HOSPITALIST

## 2023-01-16 PROCEDURE — 250N000013 HC RX MED GY IP 250 OP 250 PS 637: Performed by: PHYSICIAN ASSISTANT

## 2023-01-16 PROCEDURE — 120N000001 HC R&B MED SURG/OB

## 2023-01-16 PROCEDURE — 36415 COLL VENOUS BLD VENIPUNCTURE: CPT | Performed by: HOSPITALIST

## 2023-01-16 PROCEDURE — 94640 AIRWAY INHALATION TREATMENT: CPT | Mod: 76

## 2023-01-16 PROCEDURE — 80048 BASIC METABOLIC PNL TOTAL CA: CPT | Performed by: HOSPITALIST

## 2023-01-16 PROCEDURE — 999N000157 HC STATISTIC RCP TIME EA 10 MIN

## 2023-01-16 PROCEDURE — 250N000013 HC RX MED GY IP 250 OP 250 PS 637: Performed by: HOSPITALIST

## 2023-01-16 PROCEDURE — 250N000011 HC RX IP 250 OP 636: Performed by: PHYSICIAN ASSISTANT

## 2023-01-16 RX ORDER — DABIGATRAN ETEXILATE 75 MG/1
75 CAPSULE ORAL 2 TIMES DAILY
Status: DISCONTINUED | OUTPATIENT
Start: 2023-01-16 | End: 2023-01-21 | Stop reason: HOSPADM

## 2023-01-16 RX ADMIN — PIPERACILLIN AND TAZOBACTAM 2.25 G: 2; .25 INJECTION, POWDER, FOR SOLUTION INTRAVENOUS at 00:05

## 2023-01-16 RX ADMIN — PIPERACILLIN AND TAZOBACTAM 2.25 G: 2; .25 INJECTION, POWDER, FOR SOLUTION INTRAVENOUS at 12:24

## 2023-01-16 RX ADMIN — IPRATROPIUM BROMIDE AND ALBUTEROL SULFATE 3 ML: 2.5; .5 SOLUTION RESPIRATORY (INHALATION) at 15:22

## 2023-01-16 RX ADMIN — SENNOSIDES AND DOCUSATE SODIUM 1 TABLET: 50; 8.6 TABLET ORAL at 08:26

## 2023-01-16 RX ADMIN — ISOSORBIDE MONONITRATE 30 MG: 30 TABLET, EXTENDED RELEASE ORAL at 08:26

## 2023-01-16 RX ADMIN — MONTELUKAST 10 MG: 10 TABLET, FILM COATED ORAL at 08:26

## 2023-01-16 RX ADMIN — POLYETHYLENE GLYCOL 3350 17 G: 17 POWDER, FOR SOLUTION ORAL at 08:26

## 2023-01-16 RX ADMIN — BUDESONIDE 0.5 MG: 0.5 INHALANT RESPIRATORY (INHALATION) at 20:03

## 2023-01-16 RX ADMIN — METOPROLOL TARTRATE 25 MG: 25 TABLET, FILM COATED ORAL at 21:20

## 2023-01-16 RX ADMIN — PANTOPRAZOLE SODIUM 40 MG: 40 TABLET, DELAYED RELEASE ORAL at 06:17

## 2023-01-16 RX ADMIN — ACETAMINOPHEN 975 MG: 325 TABLET, FILM COATED ORAL at 02:36

## 2023-01-16 RX ADMIN — ACETAMINOPHEN 975 MG: 325 TABLET, FILM COATED ORAL at 12:23

## 2023-01-16 RX ADMIN — PIPERACILLIN AND TAZOBACTAM 2.25 G: 2; .25 INJECTION, POWDER, FOR SOLUTION INTRAVENOUS at 04:50

## 2023-01-16 RX ADMIN — IPRATROPIUM BROMIDE AND ALBUTEROL SULFATE 3 ML: 2.5; .5 SOLUTION RESPIRATORY (INHALATION) at 20:00

## 2023-01-16 RX ADMIN — METOPROLOL TARTRATE 25 MG: 25 TABLET, FILM COATED ORAL at 08:26

## 2023-01-16 RX ADMIN — HYDRALAZINE HYDROCHLORIDE 12.5 MG: 25 TABLET, FILM COATED ORAL at 08:26

## 2023-01-16 RX ADMIN — HYDRALAZINE HYDROCHLORIDE 12.5 MG: 25 TABLET, FILM COATED ORAL at 16:59

## 2023-01-16 RX ADMIN — BUDESONIDE 0.5 MG: 0.5 INHALANT RESPIRATORY (INHALATION) at 07:44

## 2023-01-16 RX ADMIN — PIPERACILLIN AND TAZOBACTAM 2.25 G: 2; .25 INJECTION, POWDER, FOR SOLUTION INTRAVENOUS at 16:59

## 2023-01-16 RX ADMIN — DABIGATRAN ETEXILATE MESYLATE 75 MG: 75 CAPSULE ORAL at 21:19

## 2023-01-16 RX ADMIN — ACETAMINOPHEN 975 MG: 325 TABLET, FILM COATED ORAL at 21:19

## 2023-01-16 RX ADMIN — HYDRALAZINE HYDROCHLORIDE 12.5 MG: 25 TABLET, FILM COATED ORAL at 21:19

## 2023-01-16 RX ADMIN — IPRATROPIUM BROMIDE AND ALBUTEROL SULFATE 3 ML: 2.5; .5 SOLUTION RESPIRATORY (INHALATION) at 11:35

## 2023-01-16 RX ADMIN — IPRATROPIUM BROMIDE AND ALBUTEROL SULFATE 3 ML: 2.5; .5 SOLUTION RESPIRATORY (INHALATION) at 07:44

## 2023-01-16 ASSESSMENT — ACTIVITIES OF DAILY LIVING (ADL)
ADLS_ACUITY_SCORE: 45
ADLS_ACUITY_SCORE: 43
ADLS_ACUITY_SCORE: 43
ADLS_ACUITY_SCORE: 45
ADLS_ACUITY_SCORE: 43
ADLS_ACUITY_SCORE: 43
ADLS_ACUITY_SCORE: 45
ADLS_ACUITY_SCORE: 45

## 2023-01-16 NOTE — PLAN OF CARE
Goal Outcome Evaluation:         Shift: 01/15/2023 - 07:00-19:30    Orientation: A&O x4    Vitals/Tele: VSS on 2 L NC; Tele A.fib with CVR    IV Access/drains: PIV R arm SL    Diet: Clear liquid    Mobility: Ax1 gb & walker    GI/: continent of B&B; voids in urinal. BS normoactive.    Wound/Skin: Incision to L groin CDI. 3+ edema to BLE.     Consults: SW    Discharge Plan: TBD    Other: Special code status of CPR and meds only - DNI. POD #2 of L groin exploration of inguinal hernia repair and small bowel resection. NG tube to LIS. Hx CHF, asthma, CKD. Denies nausea. Abd tender to LLQ.    See Flow sheets for assessment

## 2023-01-16 NOTE — PROGRESS NOTES
[facility]    Hospitalist Progress Note    Date of Admission:  1/13/2023    Assessment & Plan     Shiraz Ingram is a 88 year old male admitted on 1/13/2023  who presented with abdomen pain. Patient is S/P repair of incarcerated left femoral hernia with small bowel resection on 1/13/23 .PMHx of anemia, benign neoplasm of colon, aortic stenosis, pacemaker, CKD, Coronary atherosclerosis, HLD, HTN, asthma, atrial Fibrillation, and CHF. Consulted for medical management.      Acute abdomen pain   S/P repair of incarcerated left femoral hernia with small bowel resection on 1/13/23  -defer postop management to general surgery including pain control, diet, antibiotics  -Awaiting return of bowel function     Aortic stenosis S/P TAVR  Coronary atherosclerosis S/P CABG 2002  Chronic systolic and diastolic CHF  S/p pacemaker  HTN  Mild pulmonary hypertension  Last echo 6/20/22 EF 45-50%. Left ventricular diastolic function is  indeterminate. Mid and basal inferior hypokinesis.  -strict I&Os, daily wgt  -resumed home hydralazine, metoprolol, Imdur  -Holding PTA torsemide  -monitor       Atrial Fibrillation  -resumed home metoprolol   -Hold dabigatran ; resumed on 1/16 after okay from general surgery.  Hb 8.1.  Recheck tomorrow.     CKD 3  Baseline creatinine appears to be between 1.5-1.9.  GFR in the 30s/40s range.  Currently creatinine appears to be in baseline range.  -trend level   -hold diuretic  -avoid nephrotoxin medication      Chronic thrombocytopenia  PC was in normal range end of last year but he has had history of thrombocytopenia.  Currently in the low 100s.  -Continue to monitor.  -Dabigatran on hold, resumed on 1/16     HLD  -statin on hold till improved oral intake    Hypoxia, likely due to postop atelectasis  Has been on 1-2 L NC O2 postop.  Likely atelectasis.  Chest x-ray with no acute findings.  -Encourage I-S     Asthma  -no active issue  -resume home inhalers  -as needed respiratory  tx     Anemia of chronic disease  -on admission 11.5.  Baseline Hb in the 10s range.  -trend level  -transfuse if hgb drop less than 7     Hx benign neoplasm of colon  -Noted    H/o esophageal ulcer  GERD  -Continue PTA PPI    Medical Decision Making               Clinically Significant Risk Factors                # Thrombocytopenia: Lowest platelets = 114 in last 2 days, will monitor for bleeding                   Regi Gonzalez MD  Text Page (7am - 6pm, M-F)  [facility]  Securely message with the Vocera Web Console (learn more here)  Text page via [employer] Paging/Directory      Interval History   Patient seen with help of .  Patient is doing well, denies any nausea, abdominal pain.  He was sitting up in the chair.  No shortness of breath, chest pain, fever.    -Data reviewed today: I reviewed all new labs and imaging results over the last 24 hours. I personally reviewed labs.    Physical Exam   Temp: 98.2  F (36.8  C) Temp src: Oral BP: 133/68 Pulse: 68   Resp: 20 SpO2: 98 % O2 Device: Nasal cannula Oxygen Delivery: 1 LPM  Vitals:    01/13/23 1406 01/15/23 0524   Weight: 72.6 kg (160 lb) 68.9 kg (152 lb)     Vital Signs with Ranges  Temp:  [97.7  F (36.5  C)-98.2  F (36.8  C)] 98.2  F (36.8  C)  Pulse:  [60-85] 68  Resp:  [17-20] 20  BP: (131-136)/(61-68) 133/68  SpO2:  [92 %-99 %] 98 %  I/O last 3 completed shifts:  In: -   Out: 955 [Urine:925; Emesis/NG output:30]    Constitutional: Alert, appears comfortable, in no acute distress, NG tube in place  Respiratory: Non labored breathing, clear to auscultation bilaterally  Cardiovascular: RRR, no significant edema  Skin/Integumen: no rashes, no pressure sores  Neuro: alert, converses appropriately with sign language  Psych: mood and affect appropriate      Medications       acetaminophen  975 mg Oral Q8H     budesonide  0.5 mg Nebulization BID     dabigatran ANTICOAGULANT  150 mg Oral BID     hydrALAZINE  12.5 mg Oral  TID     ipratropium - albuterol 0.5 mg/2.5 mg/3 mL  3 mL Nebulization 4x daily     isosorbide mononitrate  30 mg Oral Daily     metoprolol tartrate  25 mg Oral BID     montelukast  10 mg Oral Daily     pantoprazole  40 mg Oral QAM AC     piperacillin-tazobactam  2.25 g Intravenous Q6H     polyethylene glycol  17 g Oral Daily     senna-docusate  1 tablet Oral BID     sodium chloride (PF)  3 mL Intracatheter Q8H     [Held by provider] torsemide  10 mg Oral Daily       Data   Recent Labs   Lab 01/16/23  0801 01/16/23  0732 01/15/23  0650 01/15/23  0529 01/14/23  0703 01/13/23  1415   WBC 4.1  --  4.3  --  5.7 5.5   HGB 8.1*  --  8.6*  --  10.3* 11.5*   MCV 69*  --  68*  --  70* 68*   *  --  114*  --  117* 138*   NA  --  140 140  --  141 140   POTASSIUM  --  4.9 4.8  --  4.9 4.7   CHLORIDE  --  108 106  --  105 104   CO2  --  27 26  --  28 28   BUN  --  77* 73*  --  52* 48*   CR  --  1.88* 2.10*  --  1.94* 1.94*   ANIONGAP  --  5 8  --  8 8   AYALA  --  8.9 8.7  --  9.1 10.1   GLC  --  87 101* 109* 131* 141*   ALBUMIN  --   --   --   --   --  3.8   PROTTOTAL  --   --   --   --   --  8.2   BILITOTAL  --   --   --   --   --  0.7   ALKPHOS  --   --   --   --   --  89   ALT  --   --   --   --   --  24   AST  --   --   --   --   --  24   LIPASE  --   --   --   --   --  67*       Imaging  No results found for this or any previous visit (from the past 24 hour(s)).

## 2023-01-16 NOTE — PLAN OF CARE
Goal Outcome Evaluation: A&O. VSS on 1L NC, pushing IS. Dressing to L groin CDI. Bowel sounds normoactive. Gas discomfort present. Pain rating 9/10 at times, reduced with prn Oxy x2 and heat packs. UP with A1gbW. ASL, using note pad at bedside for communication. Voiding in urinal. No BM, flatus +.  NG pulled at beginning of shift, denies nausea or vomiting. Tolerating CLD. Continue to monitor.       Plan of Care Reviewed With: patient    Overall Patient Progress: improvingOverall Patient Progress: improving

## 2023-01-16 NOTE — PROGRESS NOTES
Surgery       No complaints  NG removed  Denies pain  + flatus, no bm  Voiding  Walking   Denies CP, dyspnea     Gen:  Awake, Alert, NAD  BP (!) 144/53 (BP Location: Right arm)   Pulse 61   Temp 98  F (36.7  C) (Oral)   Resp 18   Wt 72.6 kg (160 lb)   SpO2 100%   BMI 25.71 kg/m    Resp - Non-labored clear to ascultation.    Cardiac - Irreg irreg, +m  Abdomen - soft, non tender, non distended. Left groin incision healing appropriately without erythema or drainage. Bandage removed  Extremities - no lower extremity edema or tenderness with palpation     NG residual output yesterday 30cc   yesterday.     Cre 1.88 (2.1)  Wbc 4  Hgb 8.1(8.6)     A/P 87 yo man with aortic stenosis, atrial fibrillation, CKD, asthma and chronic anemia s/p emergent open repair of strangulated left femoral hernia with short segment small bowel resection on the evening of 1/13/23.     - continue clear liquid diet until bm  - bowel regimen  - hgb stable. Ok to restart anticoagulation for A. Fib from surgery standpoint.  - discontinue abx today.   - encourage incentive spirometer use  - ambulate  - appreciate hospitalist management of medical issues.     Deven Osuna PA-C  Office: [phone]  Pager: [phone]

## 2023-01-17 ENCOUNTER — OFFICE VISIT (OUTPATIENT)
Dept: INTERPRETER SERVICES | Facility: CLINIC | Age: 88
End: 2023-01-17
Payer: COMMERCIAL

## 2023-01-17 LAB
HGB BLD-MCNC: 7.5 G/DL (ref 13.3–17.7)
PATH REPORT.COMMENTS IMP SPEC: NORMAL
PATH REPORT.COMMENTS IMP SPEC: NORMAL
PATH REPORT.FINAL DX SPEC: NORMAL
PATH REPORT.GROSS SPEC: NORMAL
PATH REPORT.MICROSCOPIC SPEC OTHER STN: NORMAL
PATH REPORT.RELEVANT HX SPEC: NORMAL
PHOTO IMAGE: NORMAL

## 2023-01-17 PROCEDURE — T1013 SIGN LANG/ORAL INTERPRETER: HCPCS | Mod: U3

## 2023-01-17 PROCEDURE — 120N000001 HC R&B MED SURG/OB

## 2023-01-17 PROCEDURE — 36415 COLL VENOUS BLD VENIPUNCTURE: CPT | Performed by: HOSPITALIST

## 2023-01-17 PROCEDURE — 85018 HEMOGLOBIN: CPT | Performed by: HOSPITALIST

## 2023-01-17 PROCEDURE — 94640 AIRWAY INHALATION TREATMENT: CPT

## 2023-01-17 PROCEDURE — 250N000013 HC RX MED GY IP 250 OP 250 PS 637: Performed by: PHYSICIAN ASSISTANT

## 2023-01-17 PROCEDURE — 99232 SBSQ HOSP IP/OBS MODERATE 35: CPT | Performed by: HOSPITALIST

## 2023-01-17 PROCEDURE — 250N000009 HC RX 250: Performed by: PHYSICIAN ASSISTANT

## 2023-01-17 PROCEDURE — 94640 AIRWAY INHALATION TREATMENT: CPT | Mod: 76

## 2023-01-17 PROCEDURE — 999N000157 HC STATISTIC RCP TIME EA 10 MIN

## 2023-01-17 RX ADMIN — ISOSORBIDE MONONITRATE 30 MG: 30 TABLET, EXTENDED RELEASE ORAL at 09:06

## 2023-01-17 RX ADMIN — IPRATROPIUM BROMIDE AND ALBUTEROL SULFATE 3 ML: 2.5; .5 SOLUTION RESPIRATORY (INHALATION) at 20:54

## 2023-01-17 RX ADMIN — MONTELUKAST 10 MG: 10 TABLET, FILM COATED ORAL at 09:06

## 2023-01-17 RX ADMIN — BUDESONIDE 0.5 MG: 0.5 INHALANT RESPIRATORY (INHALATION) at 07:15

## 2023-01-17 RX ADMIN — IPRATROPIUM BROMIDE AND ALBUTEROL SULFATE 3 ML: 2.5; .5 SOLUTION RESPIRATORY (INHALATION) at 11:48

## 2023-01-17 RX ADMIN — METOPROLOL TARTRATE 25 MG: 25 TABLET, FILM COATED ORAL at 21:40

## 2023-01-17 RX ADMIN — IPRATROPIUM BROMIDE AND ALBUTEROL SULFATE 3 ML: 2.5; .5 SOLUTION RESPIRATORY (INHALATION) at 15:41

## 2023-01-17 RX ADMIN — BUDESONIDE 0.5 MG: 0.5 INHALANT RESPIRATORY (INHALATION) at 20:54

## 2023-01-17 RX ADMIN — HYDRALAZINE HYDROCHLORIDE 12.5 MG: 25 TABLET, FILM COATED ORAL at 16:29

## 2023-01-17 RX ADMIN — PANTOPRAZOLE SODIUM 40 MG: 40 TABLET, DELAYED RELEASE ORAL at 09:06

## 2023-01-17 RX ADMIN — METOPROLOL TARTRATE 25 MG: 25 TABLET, FILM COATED ORAL at 09:06

## 2023-01-17 RX ADMIN — HYDRALAZINE HYDROCHLORIDE 12.5 MG: 25 TABLET, FILM COATED ORAL at 21:40

## 2023-01-17 RX ADMIN — IPRATROPIUM BROMIDE AND ALBUTEROL SULFATE 3 ML: 2.5; .5 SOLUTION RESPIRATORY (INHALATION) at 07:15

## 2023-01-17 RX ADMIN — HYDRALAZINE HYDROCHLORIDE 12.5 MG: 25 TABLET, FILM COATED ORAL at 09:06

## 2023-01-17 ASSESSMENT — ACTIVITIES OF DAILY LIVING (ADL)
ADLS_ACUITY_SCORE: 45

## 2023-01-17 NOTE — PLAN OF CARE
Goal Outcome Evaluation:      Plan of Care Reviewed With: patient    Overall Patient Progress: improvingOverall Patient Progress: improving    POD 4 from a lap hernia repair and SBO. A&O x4. CMS WDL. Bowel sounds normoactive, passing flatus. Pt had multiple loose bowel movements, black/brown stool. Black/brown/maroon x1 MD aware; continuing to monitor Hgb. Voiding adequately, PO intake encouraged. Tolerating full liquid diet without nausea. VSS. Tele afib with controlled ventricular response. Incision approximated and open to air. Up x1 gb, walker; ambulated frequently today. C/o minimal pain, decreased with scheduled tylenol. Plan is to continue bowel regimen and tolerate/advance diet. Discharge pending.

## 2023-01-17 NOTE — PLAN OF CARE
Goal Outcome Evaluation:      Plan of Care Reviewed With: patient    Overall Patient Progress: no changeOverall Patient Progress: no change     Pt Aox4. VSS on RA. Did request 1LPM of O2 when sleeping for comfort. Reported pain 1/10 throughout shift. Received scheduled tylenol. On clears, mainly utilized ice chips as pt states water gives him hiccups. Pt is deaf and uses notepad to communicate. Tele afib with controlled ventricular response bundle branch block. L groin steristrips clean, dry, and intact. Up many times this shift for loose liquid stools, dark brown/black with hints of bright red. Spoke with hospitalist who wanted to continue to monitor if pt is not having increased pain in abdomen or incision site and see what hgb is in AM. HgB trending down from 8.1 to 7.5. BS normoactive. Low urine output tho pt is voiding in urinal, has voided in toilet with BM as well. Tolerating clears. Will continue to monitor.

## 2023-01-17 NOTE — PLAN OF CARE
Goal Outcome Evaluation:      Plan of Care Reviewed With: patient    Overall Patient Progress: improvingOverall Patient Progress: improving    POD 3 from a lap hernia repair and SBO. A&O. CMS WDL. Bowel sounds hypoactive, passing minimal flatus; pt did have a BM this afternoon. Tolerating clear liquid diet without nausea. VSS. Tele afib with controlled ventricular response bundle branch block. Needed 1L O2 while sleeping. Dressing clean, dry and intact. Up x1 gb, walker; ambulated frequently today. C/o minimal pain, decreased with scheduled tylenol. Plan is to continue bowel regimen and tolerate/advance diet. Discharge pending.

## 2023-01-17 NOTE — PROGRESS NOTES
Overall patient seems to be doing quite well.  Reports she is tolerating a diet.  He is having some hiccups with oral intake.  Reports multiple loose stools most recently with an some bright red blood.  Pain is overall well controlled.    Afebrile with normal vital signs  Abdomen is benign, minimal swelling in left groin    Overall doing reasonably well.  Reassured patient that loose stools are not abnormal after resolution of bowel obstruction.  It actually is somewhat of a good sign.  We will however continue to monitor for GI bleed.  Continue to hold his anticoagulation at this time.  We will advance him to full liquid diet.  Encouraged p.o. water intake to stay hydrated.

## 2023-01-17 NOTE — PROGRESS NOTES
[facility]    Hospitalist Progress Note    Date of Admission:  1/13/2023    Assessment & Plan     Shiraz Ingram is a 88 year old male admitted on 1/13/2023  who presented with abdomen pain. Patient is S/P repair of incarcerated left femoral hernia with small bowel resection on 1/13/23 .PMHx of anemia, benign neoplasm of colon, aortic stenosis, pacemaker, CKD, Coronary atherosclerosis, HLD, HTN, asthma, atrial Fibrillation, and CHF. Consulted for medical management.      Acute abdomen pain   S/P repair of incarcerated left femoral hernia with small bowel resection on 1/13/23  Concern for blood in stool  -defer postop management to general surgery including pain control, diet, antibiotics  -Currently on full liquid diet.  Had multiple loose stools with concern for blood in them.  -Continue to hold dabigatran     Aortic stenosis S/P TAVR  Coronary atherosclerosis S/P CABG 2002  Chronic systolic and diastolic CHF  S/p pacemaker  HTN  Mild pulmonary hypertension  Last echo 6/20/22 EF 45-50%. Left ventricular diastolic function is  indeterminate. Mid and basal inferior hypokinesis.  -strict I&Os, daily wgt  -resumed home hydralazine, metoprolol, Imdur  -Holding PTA torsemide  -monitor       Atrial Fibrillation  -resumed home metoprolol   -Hold dabigatran      CKD 3  Baseline creatinine appears to be between 1.5-1.9.  GFR in the 30s/40s range.  Currently creatinine appears to be in baseline range.  -trend level   -hold diuretic  -avoid nephrotoxin medication      Chronic thrombocytopenia  PC was in normal range end of last year but he has had history of thrombocytopenia.  Currently in the low 100s.  -Continue to monitor.  -Dabigatran on hold     HLD  -statin on hold till improved oral intake    Hypoxia, likely due to postop atelectasis  Has been on 1-2 L NC O2 postop.  Likely atelectasis.  Chest x-ray with no acute findings.  -Encourage I-S     Asthma  -no active issue  -resume home inhalers  -as  needed respiratory tx     Anemia of chronic disease  Acute blood loss anemia  -on admission 11.5.  Baseline Hb in the 10s range.  Currently down to 7.5 postop.  Concern for GI bleed.  -trend level  -transfuse if hgb drop less than 7.  Consent signed and in chart.  -Hold Renetta Alejandro     Hx benign neoplasm of colon  -Noted    H/o esophageal ulcer  GERD  -Continue PTA PPI    Medical Decision Making               Clinically Significant Risk Factors                # Thrombocytopenia: Lowest platelets = 119 in last 2 days, will monitor for bleeding                   Regi Gonzalez MD  Text Page (7am - 6pm, M-F)  [facility]  Securely message with the Vocera Web Console (learn more here)  Text page via [employer] Paging/Directory      Interval History   Patient seen with help of .  Patient is doing well, denies any nausea, does endorse some mild discomfort at surgical site.  States he had several loose bowel movements overnight with blood in them.    -Data reviewed today: I reviewed all new labs and imaging results over the last 24 hours. I personally reviewed labs.    Physical Exam   Temp: 97.6  F (36.4  C) Temp src: Oral BP: 117/79 Pulse: 62   Resp: 16 SpO2: 97 % O2 Device: None (Room air)    Vitals:    01/13/23 1406 01/15/23 0524   Weight: 72.6 kg (160 lb) 68.9 kg (152 lb)     Vital Signs with Ranges  Temp:  [97.6  F (36.4  C)-98.7  F (37.1  C)] 97.6  F (36.4  C)  Pulse:  [62-71] 62  Resp:  [16] 16  BP: (117-152)/(58-79) 117/79  FiO2 (%):  [21 %] 21 %  SpO2:  [91 %-97 %] 97 %  I/O last 3 completed shifts:  In: 766 [P.O.:760; I.V.:6]  Out: 780 [Urine:780]    Constitutional: Alert, appears comfortable, in no acute distress, NG tube in place  Respiratory: Non labored breathing, clear to auscultation bilaterally  Cardiovascular: RRR, no significant edema  Skin/Integumen: no rashes, no pressure sores  Neuro: alert, converses appropriately with sign language  Psych: mood and  affect appropriate      Medications       budesonide  0.5 mg Nebulization BID     [Held by provider] dabigatran ANTICOAGULANT  75 mg Oral BID     hydrALAZINE  12.5 mg Oral TID     ipratropium - albuterol 0.5 mg/2.5 mg/3 mL  3 mL Nebulization 4x daily     isosorbide mononitrate  30 mg Oral Daily     metoprolol tartrate  25 mg Oral BID     montelukast  10 mg Oral Daily     pantoprazole  40 mg Oral QAM AC     polyethylene glycol  17 g Oral Daily     senna-docusate  1 tablet Oral BID     sodium chloride (PF)  3 mL Intracatheter Q8H     [Held by provider] torsemide  10 mg Oral Daily       Data   Recent Labs   Lab 01/17/23  0534 01/16/23  0801 01/16/23  0732 01/15/23  0650 01/15/23  0529 01/14/23  0703 01/13/23  1415   WBC  --  4.1  --  4.3  --  5.7 5.5   HGB 7.5* 8.1*  --  8.6*  --  10.3* 11.5*   MCV  --  69*  --  68*  --  70* 68*   PLT  --  119*  --  114*  --  117* 138*   NA  --   --  140 140  --  141 140   POTASSIUM  --   --  4.9 4.8  --  4.9 4.7   CHLORIDE  --   --  108 106  --  105 104   CO2  --   --  27 26  --  28 28   BUN  --   --  77* 73*  --  52* 48*   CR  --   --  1.88* 2.10*  --  1.94* 1.94*   ANIONGAP  --   --  5 8  --  8 8   AYALA  --   --  8.9 8.7  --  9.1 10.1   GLC  --   --  87 101* 109* 131* 141*   ALBUMIN  --   --   --   --   --   --  3.8   PROTTOTAL  --   --   --   --   --   --  8.2   BILITOTAL  --   --   --   --   --   --  0.7   ALKPHOS  --   --   --   --   --   --  89   ALT  --   --   --   --   --   --  24   AST  --   --   --   --   --   --  24   LIPASE  --   --   --   --   --   --  67*       Imaging  No results found for this or any previous visit (from the past 24 hour(s)).

## 2023-01-18 LAB
ABO/RH(D): NORMAL
ANION GAP SERPL CALCULATED.3IONS-SCNC: 10 MMOL/L (ref 7–15)
ANTIBODY SCREEN: NEGATIVE
BLD PROD TYP BPU: NORMAL
BLOOD COMPONENT TYPE: NORMAL
BUN SERPL-MCNC: 42.6 MG/DL (ref 8–23)
CALCIUM SERPL-MCNC: 8.9 MG/DL (ref 8.8–10.2)
CHLORIDE SERPL-SCNC: 99 MMOL/L (ref 98–107)
CODING SYSTEM: NORMAL
CREAT SERPL-MCNC: 1.37 MG/DL (ref 0.67–1.17)
CROSSMATCH: NORMAL
DEPRECATED HCO3 PLAS-SCNC: 26 MMOL/L (ref 22–29)
GFR SERPL CREATININE-BSD FRML MDRD: 50 ML/MIN/1.73M2
GLUCOSE SERPL-MCNC: 96 MG/DL (ref 70–99)
HGB BLD-MCNC: 6.9 G/DL (ref 13.3–17.7)
ISSUE DATE AND TIME: NORMAL
POTASSIUM SERPL-SCNC: 4.2 MMOL/L (ref 3.4–5.3)
SODIUM SERPL-SCNC: 135 MMOL/L (ref 136–145)
SPECIMEN EXPIRATION DATE: NORMAL
UNIT ABO/RH: NORMAL
UNIT NUMBER: NORMAL
UNIT STATUS: NORMAL
UNIT TYPE ISBT: 6200

## 2023-01-18 PROCEDURE — 94640 AIRWAY INHALATION TREATMENT: CPT | Mod: 76

## 2023-01-18 PROCEDURE — 250N000013 HC RX MED GY IP 250 OP 250 PS 637: Performed by: PHYSICIAN ASSISTANT

## 2023-01-18 PROCEDURE — P9016 RBC LEUKOCYTES REDUCED: HCPCS | Performed by: HOSPITALIST

## 2023-01-18 PROCEDURE — 94640 AIRWAY INHALATION TREATMENT: CPT

## 2023-01-18 PROCEDURE — 99232 SBSQ HOSP IP/OBS MODERATE 35: CPT | Performed by: HOSPITALIST

## 2023-01-18 PROCEDURE — 36415 COLL VENOUS BLD VENIPUNCTURE: CPT | Performed by: HOSPITALIST

## 2023-01-18 PROCEDURE — 250N000009 HC RX 250: Performed by: PHYSICIAN ASSISTANT

## 2023-01-18 PROCEDURE — 120N000001 HC R&B MED SURG/OB

## 2023-01-18 PROCEDURE — 250N000013 HC RX MED GY IP 250 OP 250 PS 637: Performed by: HOSPITALIST

## 2023-01-18 PROCEDURE — 80048 BASIC METABOLIC PNL TOTAL CA: CPT | Performed by: HOSPITALIST

## 2023-01-18 PROCEDURE — 86923 COMPATIBILITY TEST ELECTRIC: CPT | Performed by: HOSPITALIST

## 2023-01-18 PROCEDURE — 85018 HEMOGLOBIN: CPT | Performed by: HOSPITALIST

## 2023-01-18 PROCEDURE — 86901 BLOOD TYPING SEROLOGIC RH(D): CPT | Performed by: HOSPITALIST

## 2023-01-18 PROCEDURE — 999N000157 HC STATISTIC RCP TIME EA 10 MIN

## 2023-01-18 RX ORDER — SIMETHICONE 40MG/0.6ML
40 SUSPENSION, DROPS(FINAL DOSAGE FORM)(ML) ORAL 4 TIMES DAILY
Status: DISCONTINUED | OUTPATIENT
Start: 2023-01-18 | End: 2023-01-19

## 2023-01-18 RX ADMIN — HYDRALAZINE HYDROCHLORIDE 12.5 MG: 25 TABLET, FILM COATED ORAL at 09:41

## 2023-01-18 RX ADMIN — MONTELUKAST 10 MG: 10 TABLET, FILM COATED ORAL at 09:41

## 2023-01-18 RX ADMIN — ACETAMINOPHEN 650 MG: 325 TABLET, FILM COATED ORAL at 02:08

## 2023-01-18 RX ADMIN — BUDESONIDE 0.5 MG: 0.5 INHALANT RESPIRATORY (INHALATION) at 19:38

## 2023-01-18 RX ADMIN — IPRATROPIUM BROMIDE AND ALBUTEROL SULFATE 3 ML: 2.5; .5 SOLUTION RESPIRATORY (INHALATION) at 16:42

## 2023-01-18 RX ADMIN — SIMETHICONE 40 MG: 20 EMULSION ORAL at 21:40

## 2023-01-18 RX ADMIN — ISOSORBIDE MONONITRATE 30 MG: 30 TABLET, EXTENDED RELEASE ORAL at 09:41

## 2023-01-18 RX ADMIN — IPRATROPIUM BROMIDE AND ALBUTEROL SULFATE 3 ML: 2.5; .5 SOLUTION RESPIRATORY (INHALATION) at 19:38

## 2023-01-18 RX ADMIN — METOPROLOL TARTRATE 25 MG: 25 TABLET, FILM COATED ORAL at 21:39

## 2023-01-18 RX ADMIN — SIMETHICONE 40 MG: 20 EMULSION ORAL at 10:37

## 2023-01-18 RX ADMIN — IPRATROPIUM BROMIDE AND ALBUTEROL SULFATE 3 ML: 2.5; .5 SOLUTION RESPIRATORY (INHALATION) at 08:06

## 2023-01-18 RX ADMIN — BUDESONIDE 0.5 MG: 0.5 INHALANT RESPIRATORY (INHALATION) at 08:06

## 2023-01-18 RX ADMIN — ACETAMINOPHEN 650 MG: 325 TABLET, FILM COATED ORAL at 23:35

## 2023-01-18 RX ADMIN — PANTOPRAZOLE SODIUM 40 MG: 40 TABLET, DELAYED RELEASE ORAL at 06:30

## 2023-01-18 RX ADMIN — METOPROLOL TARTRATE 25 MG: 25 TABLET, FILM COATED ORAL at 09:41

## 2023-01-18 RX ADMIN — IPRATROPIUM BROMIDE AND ALBUTEROL SULFATE 3 ML: 2.5; .5 SOLUTION RESPIRATORY (INHALATION) at 11:44

## 2023-01-18 ASSESSMENT — ACTIVITIES OF DAILY LIVING (ADL)
ADLS_ACUITY_SCORE: 45
DEPENDENT_IADLS:: INDEPENDENT
ADLS_ACUITY_SCORE: 45

## 2023-01-18 NOTE — PLAN OF CARE
Goal Outcome Evaluation: POD 5  lap hernia repair and bowel resection. A&O x4. VSS on RA ex M temp 99.8. CMS WDL. Bowel sounds normoactive, passing flatus. Pt had multiple small loose bowel movements and one large BM, black/brown stool with some red streaks. MD updated, no new orders, HGB this morning. Up with SBA GB walker. L abdominal incision approximated with steri strips and open to air. Large bruise in surrounding area and across left flank. Pain managed with PRN tylenol for back, denies pain in abdomin. Tolerating Fulls. Continue to monitor.     Overall Patient Progress: improvingOverall Patient Progress: improving

## 2023-01-18 NOTE — PROGRESS NOTES
Surgery    No new complaints  Minimal pain  ++bowel function, maroon stools.  Tolerating diet  Walking   Denies CP, dyspnea    Gen:  Awake, Alert, NAD  BP (!) 147/64 (BP Location: Right arm)   Pulse 63   Temp 97.9  F (36.6  C) (Oral)   Resp 16   Wt 68.9 kg (152 lb)   SpO2 96%   BMI 24.43 kg/m    Resp - Non-labored    Cardiac - irreg, irreg  Abdomen - soft, appropriately tender, non distended. Incisions healing appropriately without erythema or drainage. Dependent ecchymosis at left flank as expected  Extremities - no lower extremity edema or tenderness with palpation    UOP 1330 yesterday  Hgb 6.9 (7.5)  cre 1.37    A/P88 yo man with aortic stenosis, atrial fibrillation, CKD, asthma and chronic anemia s/p emergent open repair of strangulated left femoral hernia with short segment small bowel resection on the evening of 1/13/23.     - transfusion today for post operative acute blood loss anemia.  - hold anticoagulation  - stools changed from red to maroon/black which is encouraging for hemostasis.   - monitor left side ecchymosis  - add simethicone for gas pain/pressure.  - full liquid diet, may advance to regular diet  - encourage incentive spirometer use  - ambulate  - dispo: home when stable    Deven Osuna PA-C  Office: [phone]  Pager: [phone]

## 2023-01-18 NOTE — PLAN OF CARE
Goal Outcome Evaluation:      Plan of Care Reviewed With: patient    Overall Patient Progress: improvingOverall Patient Progress: improving    POD 5 from a lap hernia repair and SBO. A&O x4. CMS WDL. Bowel sounds normoactive, passing flatus. Pt had multiple loose bowel movements, black/brown stool. Voiding adequately, PO intake encouraged. Tolerating full liquid diet without nausea. VSS, ex one soft BP during blood administration, self-limiting MD notified. Incision approximated and open to air, bruising around site/L hip, MD aware. Pt is up x1 gb, walker; ambulated frequently today. C/o minimal back pain, pt declined medication and heat/ice. Plan is to continue monitoring hemoglobin and trial low fiber diet tomorrow. Discharge pending, patient is eager to get home.

## 2023-01-18 NOTE — PROGRESS NOTES
"MD Notification    Notified Person: MD    Notified Person Name:    Notification Date/Time:    Notification Interaction:    Purpose of Notification: \"BP dropped during blood administration. /66 @ 1045, 83/65 @ 1110, 115/59 now. Blood paused. Please advise. Got a few BP meds at 0945.     Orders Received: continue infusion; hydralazine held    Comments: running at 85 mL/hr in order to complete transfusion within 4 hour limit      "

## 2023-01-18 NOTE — PROVIDER NOTIFICATION
MD Notification    Notified Person: MD    Notified Person Name: Dr. Gonzalez    Notification Date/Time: 1/18/23 @ 08:42    Notification Interaction: Vocera Message    Purpose of Notification: Hgb came back at 6.9 this morning, orders for blood transfusion and type and cross needed.    Orders Received: Transfusion and type and cross orders placed @ 0905.    Comments:

## 2023-01-18 NOTE — CONSULTS
Care Management Initial Consult    General Information  Assessment completed with: Patient, VM-chart review,    Type of CM/SW Visit: Initial Assessment    Primary Care Provider verified and updated as needed: Yes   Readmission within the last 30 days:        Reason for Consult: discharge planning  Advance Care Planning:            Communication Assessment  Patient's communication style: spoken language (English or Bilingual)             Cognitive  Cognitive/Neuro/Behavioral: WDL                      Living Environment:   People in home: child(perla), adult     Current living Arrangements: house      Able to return to prior arrangements: yes       Family/Social Support:  Care provided by: self  Provides care for: pet(s)  Marital Status:  (wife  14 years ago)  Children          Description of Support System: Supportive         Current Resources:   Patient receiving home care services: No     Community Resources: None  Equipment currently used at home: grab bar, toilet, grab bar, tub/shower, walker, standard, shower chair  Supplies currently used at home:      Employment/Financial:  Employment Status: retired        Financial Concerns: No concerns identified   Referral to Financial Worker: No       Lifestyle & Psychosocial Needs:  Social Determinants of Health     Tobacco Use: Low Risk      Smoking Tobacco Use: Never     Smokeless Tobacco Use: Never     Passive Exposure: Not on file   Alcohol Use: Not on file   Financial Resource Strain: Low Risk      Difficulty of Paying Living Expenses: Not hard at all   Food Insecurity: No Food Insecurity     Worried About Running Out of Food in the Last Year: Never true     Ran Out of Food in the Last Year: Never true   Transportation Needs: No Transportation Needs     Lack of Transportation (Medical): No     Lack of Transportation (Non-Medical): No   Physical Activity: Not on file   Stress: Not on file   Social Connections: Not on file   Intimate Partner Violence: Not on  file   Depression: Not at risk     PHQ-2 Score: 0   Housing Stability: Not on file       Functional Status:  Prior to admission patient needed assistance:   Dependent ADLs:: Independent  Dependent IADLs:: Independent       Mental Health Status:  Mental Health Status: No Current Concerns       Chemical Dependency Status:  Chemical Dependency Status: No Current Concerns             Values/Beliefs:  Spiritual, Cultural Beliefs, Worship Practices, Values that affect care: no               Care Management Discharge Note    Discharge Date: 01/20/2023       Discharge Disposition: Home    Discharge Services: None    Discharge DME:  None    Discharge Transportation: family or friend will provide    Private pay costs discussed: Not applicable    PAS Confirmation Code:    Patient/family educated on Medicare website which has current facility and service quality ratings: no    Education Provided on the Discharge Plan:  yes  Persons Notified of Discharge Plans: patient  Patient/Family in Agreement with the Plan: yes    Handoff Referral Completed: No    Additional Information:  Initial consult done with patient and professional . Lives independently in own home with his adult sons. No needs at discharge identified at this time.     Adina Joe RN   [facility]   Phone [phone]

## 2023-01-18 NOTE — PROGRESS NOTES
[facility]    Hospitalist Progress Note    Date of Admission:  1/13/2023    Assessment & Plan     Shiraz Ingram is a 88 year old male admitted on 1/13/2023  who presented with abdomen pain. Patient is S/P repair of incarcerated left femoral hernia with small bowel resection on 1/13/23 .PMHx of anemia, benign neoplasm of colon, aortic stenosis, pacemaker, CKD, Coronary atherosclerosis, HLD, HTN, asthma, atrial Fibrillation, and CHF. Consulted for medical management.      Acute abdomen pain   S/P repair of incarcerated left femoral hernia with small bowel resection on 1/13/23  Concern for blood in stool  -defer postop management to general surgery including pain control, diet, antibiotics  -Currently on full liquid diet.  Had multiple loose stools with concern for blood in them.  Advanced to regular diet on 1/18.  -Continue to hold dabigatran     Aortic stenosis S/P TAVR  Coronary atherosclerosis S/P CABG 2002  Chronic systolic and diastolic CHF  S/p pacemaker  HTN  Mild pulmonary hypertension  Last echo 6/20/22 EF 45-50%. Left ventricular diastolic function is  indeterminate. Mid and basal inferior hypokinesis.  -strict I&Os, daily wgt  -resumed home hydralazine, metoprolol, Imdur  -Holding PTA torsemide  -monitor       Atrial Fibrillation  -resumed home metoprolol   -Hold dabigatran      CKD 3  Baseline creatinine appears to be between 1.5-1.9.  GFR in the 30s/40s range.  Currently creatinine appears to be in baseline range.  -trend level   -hold diuretic  -avoid nephrotoxin medication      Chronic thrombocytopenia  PC was in normal range end of last year but he has had history of thrombocytopenia.  Currently in the low 100s.  -Continue to monitor.  -Dabigatran on hold     HLD  -statin on hold till improved oral intake    Hypoxia, likely due to postop atelectasis, resolved  Has been on 1-2 L NC O2 postop.  Likely atelectasis.  Chest x-ray with no acute findings.  -Encourage  I-S     Asthma  -no active issue  -resume home inhalers  -as needed respiratory tx     Anemia of chronic disease  Acute blood loss anemia  -on admission 11.5.  Baseline Hb in the 10s range.  Currently down to 6.9 postop.  -trend level  -Transfused 1 unit PRBC on 1/18.  Recheck hemoglobin tomorrow.  -Hold Pradaxa.     Hx benign neoplasm of colon  -Noted    H/o esophageal ulcer  GERD  -Continue PTA PPI    Medical Decision Making               Clinically Significant Risk Factors                                  Regi Gonzalez MD  Text Page (7am - 6pm, M-F)  [facility]  Securely message with the Vocera Web Console (learn more here)  Text page via [employer] Paging/Directory      Interval History   Patient seen with help of .  Patient is doing well, complains of some gas pains today.  Denies shortness of breath or chest pain.  Agrees to blood transfusion today.    -Data reviewed today: I reviewed all new labs and imaging results over the last 24 hours. I personally reviewed labs.    Physical Exam   Temp: 98.4  F (36.9  C) Temp src: Oral BP: 135/71 Pulse: 67   Resp: 16 SpO2: 97 % O2 Device: None (Room air)    Vitals:    01/13/23 1406 01/15/23 0524   Weight: 72.6 kg (160 lb) 68.9 kg (152 lb)     Vital Signs with Ranges  Temp:  [97.1  F (36.2  C)-98.9  F (37.2  C)] 98.4  F (36.9  C)  Pulse:  [60-76] 67  Resp:  [16-20] 16  BP: ()/(55-73) 135/71  SpO2:  [94 %-98 %] 97 %  I/O last 3 completed shifts:  In: 933 [P.O.:930; I.V.:3]  Out: 1330 [Urine:1330]    Constitutional: Alert, appears comfortable, in no acute distress, NG tube in place  Respiratory: Non labored breathing, clear to auscultation bilaterally  Cardiovascular: RRR, no significant edema  Skin/Integumen: no rashes, no pressure sores  Neuro: alert, converses appropriately with sign language  Psych: mood and affect appropriate      Medications       budesonide  0.5 mg Nebulization BID     [Held by provider]  dabigatran ANTICOAGULANT  75 mg Oral BID     [Held by provider] hydrALAZINE  12.5 mg Oral TID     ipratropium - albuterol 0.5 mg/2.5 mg/3 mL  3 mL Nebulization 4x daily     isosorbide mononitrate  30 mg Oral Daily     metoprolol tartrate  25 mg Oral BID     montelukast  10 mg Oral Daily     pantoprazole  40 mg Oral QAM AC     polyethylene glycol  17 g Oral Daily     senna-docusate  1 tablet Oral BID     simethicone  40 mg Oral 4x Daily     sodium chloride (PF)  3 mL Intracatheter Q8H     [Held by provider] torsemide  10 mg Oral Daily       Data   Recent Labs   Lab 01/18/23  0739 01/17/23  0534 01/16/23  0801 01/16/23  0732 01/15/23  0650 01/15/23  0529 01/14/23  0703 01/13/23  1415   WBC  --   --  4.1  --  4.3  --  5.7 5.5   HGB 6.9* 7.5* 8.1*  --  8.6*  --  10.3* 11.5*   MCV  --   --  69*  --  68*  --  70* 68*   PLT  --   --  119*  --  114*  --  117* 138*   *  --   --  140 140  --  141 140   POTASSIUM 4.2  --   --  4.9 4.8  --  4.9 4.7   CHLORIDE 99  --   --  108 106  --  105 104   CO2 26  --   --  27 26  --  28 28   BUN 42.6*  --   --  77* 73*  --  52* 48*   CR 1.37*  --   --  1.88* 2.10*  --  1.94* 1.94*   ANIONGAP 10  --   --  5 8  --  8 8   AYALA 8.9  --   --  8.9 8.7  --  9.1 10.1   GLC 96  --   --  87 101*   < > 131* 141*   ALBUMIN  --   --   --   --   --   --   --  3.8   PROTTOTAL  --   --   --   --   --   --   --  8.2   BILITOTAL  --   --   --   --   --   --   --  0.7   ALKPHOS  --   --   --   --   --   --   --  89   ALT  --   --   --   --   --   --   --  24   AST  --   --   --   --   --   --   --  24   LIPASE  --   --   --   --   --   --   --  67*    < > = values in this interval not displayed.       Imaging  No results found for this or any previous visit (from the past 24 hour(s)).

## 2023-01-19 LAB — HGB BLD-MCNC: 8.4 G/DL (ref 13.3–17.7)

## 2023-01-19 PROCEDURE — 250N000013 HC RX MED GY IP 250 OP 250 PS 637: Performed by: PHYSICIAN ASSISTANT

## 2023-01-19 PROCEDURE — 94640 AIRWAY INHALATION TREATMENT: CPT

## 2023-01-19 PROCEDURE — 999N000157 HC STATISTIC RCP TIME EA 10 MIN

## 2023-01-19 PROCEDURE — 85018 HEMOGLOBIN: CPT | Performed by: HOSPITALIST

## 2023-01-19 PROCEDURE — 120N000001 HC R&B MED SURG/OB

## 2023-01-19 PROCEDURE — 94640 AIRWAY INHALATION TREATMENT: CPT | Mod: 76

## 2023-01-19 PROCEDURE — 250N000009 HC RX 250: Performed by: PHYSICIAN ASSISTANT

## 2023-01-19 PROCEDURE — 99232 SBSQ HOSP IP/OBS MODERATE 35: CPT | Performed by: INTERNAL MEDICINE

## 2023-01-19 PROCEDURE — 250N000013 HC RX MED GY IP 250 OP 250 PS 637: Performed by: HOSPITALIST

## 2023-01-19 PROCEDURE — 36415 COLL VENOUS BLD VENIPUNCTURE: CPT | Performed by: HOSPITALIST

## 2023-01-19 RX ORDER — SIMETHICONE 40MG/0.6ML
40 SUSPENSION, DROPS(FINAL DOSAGE FORM)(ML) ORAL 4 TIMES DAILY PRN
Status: DISCONTINUED | OUTPATIENT
Start: 2023-01-19 | End: 2023-01-21 | Stop reason: HOSPADM

## 2023-01-19 RX ORDER — AMOXICILLIN 250 MG
1 CAPSULE ORAL 2 TIMES DAILY PRN
Status: DISCONTINUED | OUTPATIENT
Start: 2023-01-19 | End: 2023-01-21 | Stop reason: HOSPADM

## 2023-01-19 RX ORDER — POLYETHYLENE GLYCOL 3350 17 G/17G
17 POWDER, FOR SOLUTION ORAL DAILY PRN
Status: DISCONTINUED | OUTPATIENT
Start: 2023-01-19 | End: 2023-01-21 | Stop reason: HOSPADM

## 2023-01-19 RX ORDER — LANOLIN ALCOHOL/MO/W.PET/CERES
3-6 CREAM (GRAM) TOPICAL
Status: DISCONTINUED | OUTPATIENT
Start: 2023-01-19 | End: 2023-01-21 | Stop reason: HOSPADM

## 2023-01-19 RX ADMIN — ISOSORBIDE MONONITRATE 30 MG: 30 TABLET, EXTENDED RELEASE ORAL at 10:05

## 2023-01-19 RX ADMIN — IPRATROPIUM BROMIDE AND ALBUTEROL SULFATE 3 ML: 2.5; .5 SOLUTION RESPIRATORY (INHALATION) at 12:09

## 2023-01-19 RX ADMIN — IPRATROPIUM BROMIDE AND ALBUTEROL SULFATE 3 ML: 2.5; .5 SOLUTION RESPIRATORY (INHALATION) at 07:58

## 2023-01-19 RX ADMIN — MONTELUKAST 10 MG: 10 TABLET, FILM COATED ORAL at 10:05

## 2023-01-19 RX ADMIN — ACETAMINOPHEN 650 MG: 325 TABLET, FILM COATED ORAL at 22:05

## 2023-01-19 RX ADMIN — MELATONIN TAB 3 MG 6 MG: 3 TAB at 22:05

## 2023-01-19 RX ADMIN — PANTOPRAZOLE SODIUM 40 MG: 40 TABLET, DELAYED RELEASE ORAL at 06:21

## 2023-01-19 RX ADMIN — BUDESONIDE 0.5 MG: 0.5 INHALANT RESPIRATORY (INHALATION) at 07:58

## 2023-01-19 RX ADMIN — ACETAMINOPHEN 650 MG: 325 TABLET, FILM COATED ORAL at 13:18

## 2023-01-19 RX ADMIN — SIMETHICONE 40 MG: 20 EMULSION ORAL at 10:03

## 2023-01-19 RX ADMIN — IPRATROPIUM BROMIDE AND ALBUTEROL SULFATE 3 ML: 2.5; .5 SOLUTION RESPIRATORY (INHALATION) at 17:20

## 2023-01-19 RX ADMIN — IPRATROPIUM BROMIDE AND ALBUTEROL SULFATE 3 ML: 2.5; .5 SOLUTION RESPIRATORY (INHALATION) at 21:47

## 2023-01-19 RX ADMIN — METOPROLOL TARTRATE 25 MG: 25 TABLET, FILM COATED ORAL at 20:38

## 2023-01-19 RX ADMIN — METOPROLOL TARTRATE 25 MG: 25 TABLET, FILM COATED ORAL at 10:05

## 2023-01-19 RX ADMIN — BUDESONIDE 0.5 MG: 0.5 INHALANT RESPIRATORY (INHALATION) at 21:47

## 2023-01-19 ASSESSMENT — ACTIVITIES OF DAILY LIVING (ADL)
ADLS_ACUITY_SCORE: 45
ADLS_ACUITY_SCORE: 36
ADLS_ACUITY_SCORE: 45
ADLS_ACUITY_SCORE: 36
ADLS_ACUITY_SCORE: 45

## 2023-01-19 NOTE — PROGRESS NOTES
Surgery    No complaints  Denies pain  ++bowel function   Tolerating diet  Walking   Denies CP, dyspnea    Gen:  Awake, Alert, NAD  BP (!) 141/57 (BP Location: Right arm)   Pulse 66   Temp 98.6  F (37  C) (Oral)   Resp 19   Wt 68.9 kg (152 lb)   SpO2 95%   BMI 24.43 kg/m    Resp - Non-labored  Abdomen - soft, non tender, non distended. Incision healing appropriately without erythema or drainage. Dependent ecchymosis and edema to left flank. Minimal scrotal bruising.  Extremities - no lower extremity edema or tenderness with palpation    A/P 89 yo man with aortic stenosis, atrial fibrillation, CKD, asthma and chronic anemia s/p emergent open repair of strangulated left femoral hernia with short segment small bowel resection on the evening of 1/13/23.     - low fiber diet  - prn simethicone  - prn bowel regimen  - hgb 8.4, monitor for stability  - melatonin for sleep aid  - encourage incentive spirometer use  - ambulate  - dispo: doing well from surgical standpoint. Following along with serial hgb and resumption of pradaxa when hgb stable.   - called patient's son Forrest and provided and update/answered questions    Deven Osuna PA-C  Office: [phone]  Pager: [phone]

## 2023-01-19 NOTE — PROGRESS NOTES
[facility]    Medicine Progress Note - Hospitalist Service    Date of Admission:  1/13/2023    Assessment & Plan   Shiraz Ingram is a 88 year old male admitted on 1/13/2023  who presented with abdomen pain. Patient is S/P repair of incarcerated left femoral hernia with small bowel resection on 1/13/23 .PMHx of anemia, benign neoplasm of colon, aortic stenosis, pacemaker, CKD, Coronary atherosclerosis, HLD, HTN, asthma, atrial Fibrillation, and CHF. Consulted for medical management.      Acute abdomen pain   S/P repair of incarcerated left femoral hernia with small bowel resection on 1/13/23  -defer postop management to general surgery including pain control, diet, antibiotics   -Tolerating low fiber diet currently    Anemia of chronic disease  Acute postop blood loss anemia  -Admission hemoglobin 11.5, baseline hemoglobin in the 10s range, patient had multiple loose stools with blood noted on 1/18  -PTA on dabigatran for A. fib as below  -PTA dabigatran currently on hold  -Status post PRBC transfusion on 1/18 with a hemoglobin gissell of 6.9  -Appropriate improvement in hemoglobin posttransfusion to 8.4 this morning     Aortic stenosis S/P TAVR  Coronary atherosclerosis S/P CABG 2002  Chronic systolic and diastolic CHF  S/p pacemaker  HTN  Mild pulmonary hypertension  Last echo 6/20/22 EF 45-50%. Left ventricular diastolic function is  indeterminate. Mid and basal inferior hypokinesis.  -strict I&Os, daily wgt  -Continue PTA metoprolol, Imdur  - PTA torsemide and hydralazine due to possible hemodynamic instability with bleed as above  -monitor and adjust medication as needed      Atrial Fibrillation  -Continue home metoprolol   -Hold dabigatran due to  bleed as above  -Resumption once hemoglobin stable and once okay by surgery     CKD 3  Baseline creatinine appears to be between 1.5-1.9.  GFR in the 30s/40s range.  Currently creatinine appears to be in baseline range.  -trend level   -hold  diuretic  -avoid nephrotoxin medication      Chronic thrombocytopenia  PC was in normal range end of last year but he has had history of thrombocytopenia.  Currently in the low 110s.  -Continue to monitor intermittently.  -Dabigatran on hold as above     HLD  -statin on hold till improved oral intake     Hypoxia, likely due to postop atelectasis, resolved  Has been on 1-2 L NC O2 postop.  Likely atelectasis.  Chest x-ray with no acute findings.  -Encourage I-S     Asthma  -no active issue  -resume home inhalers  -as needed respiratory tx     Hx benign neoplasm of colon  -Noted     H/o esophageal ulcer  GERD  -Continue PTA PPI     Diet: Room Service  Low Fiber Diet    DVT Prophylaxis: Pneumatic Compression Devices.  Pradaxa currently on hold due to above  Pak Catheter: Not present  Lines: None     Cardiac Monitoring: None    Clinically Significant Risk Factors                                Disposition Plan     Expected Discharge Date: 01/20/2023      Destination: home;home with family  Discharge Comments: 1/18 blood transfusion          Tiffany Humphrey MD  Hospitalist Service  [facility]  Securely message with [employer] (more info)  Text page via [employer] Paging/Directory   ______________________________________________________________________    Interval History   Patient with no new complaints.  Limited communication due to [facility] status and no availability of  at the time of my evaluation, communicated with writing.  No new nursing concerns.    Physical Exam   Vital Signs: Temp: 98.6  F (37  C) Temp src: Oral BP: (!) 141/57 Pulse: 66   Resp: 19 SpO2: 95 % O2 Device: None (Room air)    Weight: 152 lbs 0 oz    Exam:  Constitutional: Awake, alert and no distress. Appears comfortable  Head: Normocephalic. No masses, lesions, tenderness or abnormalities  ENT: ENT exam normal, no neck nodes or sinus tenderness  Cardiovascular: RRR.  2+ murmurs, no rubs or JVD  Respiratory: Normal WOB,b/l  equal air entry, no wheezes or crackles   Gastrointestinal: Abdomen soft, surgical incision looks clean, dry and approximated, periincisional tenderness present. BS normal. No masses, organomegaly  : Deferred  Extremities : No edema , no clubbing or cyanosis    Neurologic: Cranial nerves II-XII grossly intact , power symmetrical, Reflexes normal and symmetric. Sensation grossly WNL.      Medical Decision Making       38 MINUTES SPENT BY ME on the date of service doing chart review, history, exam, documentation & further activities per the note.      Data     I have personally reviewed the following data over the past 24 hrs:    N/A  \   8.4 (L)   / N/A     N/A N/A N/A /  N/A   N/A N/A N/A \       Imaging results reviewed over the past 24 hrs:   No results found for this or any previous visit (from the past 24 hour(s)).  Recent Labs   Lab 01/19/23  0721 01/18/23  0739 01/17/23  0534 01/16/23  0801 01/16/23  0732 01/15/23  0650 01/15/23  0529 01/14/23  0703 01/13/23  1415   WBC  --   --   --  4.1  --  4.3  --  5.7 5.5   HGB 8.4* 6.9* 7.5* 8.1*  --  8.6*  --  10.3* 11.5*   MCV  --   --   --  69*  --  68*  --  70* 68*   PLT  --   --   --  119*  --  114*  --  117* 138*   NA  --  135*  --   --  140 140  --  141 140   POTASSIUM  --  4.2  --   --  4.9 4.8  --  4.9 4.7   CHLORIDE  --  99  --   --  108 106  --  105 104   CO2  --  26  --   --  27 26  --  28 28   BUN  --  42.6*  --   --  77* 73*  --  52* 48*   CR  --  1.37*  --   --  1.88* 2.10*  --  1.94* 1.94*   ANIONGAP  --  10  --   --  5 8  --  8 8   AYALA  --  8.9  --   --  8.9 8.7  --  9.1 10.1   GLC  --  96  --   --  87 101*   < > 131* 141*   ALBUMIN  --   --   --   --   --   --   --   --  3.8   PROTTOTAL  --   --   --   --   --   --   --   --  8.2   BILITOTAL  --   --   --   --   --   --   --   --  0.7   ALKPHOS  --   --   --   --   --   --   --   --  89   ALT  --   --   --   --   --   --   --   --  24   AST  --   --   --   --   --   --   --   --  24   LIPASE  --   --    --   --   --   --   --   --  67*    < > = values in this interval not displayed.

## 2023-01-19 NOTE — PLAN OF CARE
Goal Outcome Evaluation: POD6  lap hernia repair and bowel resection. A&O x4. VSS on RA. Bowel sounds normoactive, passing flatus. Pt had 3 small loose bowel movements maroon in color. HGB this morning. Up with SBA GB walker. L abdominal incision approximated with steri strips and open to air. Large bruise in surrounding area and across left flank, monitoring. Pain managed with PRN tylenol for back, denies pain in abdomin. Tolerating low fiber. Plans to discharge home when stable.          Plan of Care Reviewed With: patient    Overall Patient Progress: improvingOverall Patient Progress: improving

## 2023-01-20 LAB — HGB BLD-MCNC: 7.3 G/DL (ref 13.3–17.7)

## 2023-01-20 PROCEDURE — 99232 SBSQ HOSP IP/OBS MODERATE 35: CPT | Performed by: INTERNAL MEDICINE

## 2023-01-20 PROCEDURE — 85018 HEMOGLOBIN: CPT | Performed by: INTERNAL MEDICINE

## 2023-01-20 PROCEDURE — 94640 AIRWAY INHALATION TREATMENT: CPT

## 2023-01-20 PROCEDURE — 250N000013 HC RX MED GY IP 250 OP 250 PS 637: Performed by: HOSPITALIST

## 2023-01-20 PROCEDURE — 250N000009 HC RX 250: Performed by: PHYSICIAN ASSISTANT

## 2023-01-20 PROCEDURE — 999N000157 HC STATISTIC RCP TIME EA 10 MIN

## 2023-01-20 PROCEDURE — 120N000001 HC R&B MED SURG/OB

## 2023-01-20 PROCEDURE — 36415 COLL VENOUS BLD VENIPUNCTURE: CPT | Performed by: INTERNAL MEDICINE

## 2023-01-20 PROCEDURE — 250N000013 HC RX MED GY IP 250 OP 250 PS 637: Performed by: PHYSICIAN ASSISTANT

## 2023-01-20 PROCEDURE — 94640 AIRWAY INHALATION TREATMENT: CPT | Mod: 76

## 2023-01-20 RX ADMIN — BUDESONIDE 0.5 MG: 0.5 INHALANT RESPIRATORY (INHALATION) at 21:32

## 2023-01-20 RX ADMIN — METOPROLOL TARTRATE 25 MG: 25 TABLET, FILM COATED ORAL at 21:09

## 2023-01-20 RX ADMIN — ISOSORBIDE MONONITRATE 30 MG: 30 TABLET, EXTENDED RELEASE ORAL at 08:24

## 2023-01-20 RX ADMIN — IPRATROPIUM BROMIDE AND ALBUTEROL SULFATE 3 ML: 2.5; .5 SOLUTION RESPIRATORY (INHALATION) at 11:10

## 2023-01-20 RX ADMIN — METOPROLOL TARTRATE 25 MG: 25 TABLET, FILM COATED ORAL at 08:25

## 2023-01-20 RX ADMIN — HYDRALAZINE HYDROCHLORIDE 12.5 MG: 25 TABLET, FILM COATED ORAL at 16:16

## 2023-01-20 RX ADMIN — IPRATROPIUM BROMIDE AND ALBUTEROL SULFATE 3 ML: 2.5; .5 SOLUTION RESPIRATORY (INHALATION) at 07:39

## 2023-01-20 RX ADMIN — ACETAMINOPHEN 650 MG: 325 TABLET, FILM COATED ORAL at 02:32

## 2023-01-20 RX ADMIN — IPRATROPIUM BROMIDE AND ALBUTEROL SULFATE 3 ML: 2.5; .5 SOLUTION RESPIRATORY (INHALATION) at 21:32

## 2023-01-20 RX ADMIN — MONTELUKAST 10 MG: 10 TABLET, FILM COATED ORAL at 08:25

## 2023-01-20 RX ADMIN — BUDESONIDE 0.5 MG: 0.5 INHALANT RESPIRATORY (INHALATION) at 07:39

## 2023-01-20 RX ADMIN — MELATONIN TAB 3 MG 6 MG: 3 TAB at 22:08

## 2023-01-20 RX ADMIN — HYDRALAZINE HYDROCHLORIDE 12.5 MG: 25 TABLET, FILM COATED ORAL at 21:09

## 2023-01-20 RX ADMIN — IPRATROPIUM BROMIDE AND ALBUTEROL SULFATE 3 ML: 2.5; .5 SOLUTION RESPIRATORY (INHALATION) at 15:00

## 2023-01-20 RX ADMIN — PANTOPRAZOLE SODIUM 40 MG: 40 TABLET, DELAYED RELEASE ORAL at 08:24

## 2023-01-20 RX ADMIN — ACETAMINOPHEN 650 MG: 325 TABLET, FILM COATED ORAL at 20:00

## 2023-01-20 ASSESSMENT — ACTIVITIES OF DAILY LIVING (ADL)
ADLS_ACUITY_SCORE: 30
ADLS_ACUITY_SCORE: 36

## 2023-01-20 NOTE — PROGRESS NOTES
Surgery    Visit via    No complaints  +bowel function, no red or maroon stools.  Tolerating diet  Walking   Denies CP, dyspnea    Gen:  Awake, Alert, NAD  BP (!) 153/65 (BP Location: Left arm)   Pulse 64   Temp 98.1  F (36.7  C) (Oral)   Resp 16   Wt 68.9 kg (152 lb)   SpO2 95%   BMI 24.43 kg/m    Resp - Non-labored  Abdomen - soft, non tender, non distended. Incisions healing appropriately without erythema or drainage. Mild ecchymosis of left flank and scrotum.  Extremities - no lower extremity edema or tenderness with palpation    Hgb still pending    A/P   87 yo man with aortic stenosis, atrial fibrillation, CKD, asthma and chronic anemia s/p emergent open repair of strangulated left femoral hernia with short segment small bowel resection on the evening of 1/13/23.    - low fiber diet  - encourage incentive spirometer use  - ambulate  - dispo: awaiting stable hgb, restart pradaxa. Patient desires to be discharged asap  - instructed  - surgery team will follow up in about 2 weeks with phone call    Deven Osuna PA-C  Office: [phone]  Pager: [phone]

## 2023-01-20 NOTE — PLAN OF CARE
POD 6. A&O. CMS intact with 1-2+ BLE edema. Several loose brown BMs, maroon/black BM x1, tolerating regular diet. VSS. Incision WLD with amanda incisional ecchymosis. Up with SBA, GB. C/o mild back pain, decreased with tylenol. Discharge pending restart/monitoring of anticoagulant.

## 2023-01-20 NOTE — PLAN OF CARE
POD 6 lap hernia repair and bowel resection. A&Ox4, VSS except HTN on RA. PIV SL. Tolerating low fiber diet. SBA w/ WK/GB, urinal at bedside. L abd incision approximated w/ steri strips and open to air. No BM this shift. Given tylenol for pt back pain. Melatonin given to promote sleep. Discharge pending for improvement.

## 2023-01-20 NOTE — PROGRESS NOTES
[facility]    Medicine Progress Note - Hospitalist Service    Date of Admission:  1/13/2023    Assessment & Plan   Shiraz Ingram is a 88 year old male admitted on 1/13/2023  who presented with abdomen pain. Patient is S/P repair of incarcerated left femoral hernia with small bowel resection on 1/13/23 .PMHx of anemia, benign neoplasm of colon, aortic stenosis, pacemaker, CKD, Coronary atherosclerosis, HLD, HTN, asthma, atrial Fibrillation, and CHF. Consulted for medical management.      Acute abdomen pain   S/P repair of incarcerated left femoral hernia with small bowel resection on 1/13/23  -defer postop management to general surgery including pain control, disposition  -Tolerating low fiber diet currently    Anemia of chronic disease  Acute postop blood loss anemia  -Admission hemoglobin 11.5, baseline hemoglobin in the 10s range, patient had multiple loose stools with blood noted on 1/18  -PTA on dabigatran for A. fib as below  -PTA dabigatran currently on hold  -Status post PRBC transfusion on 1/18 with a hemoglobin gissell of 6.9  -Patient had 1 small bowel movement per nursing earlier today which was more of a dark/old blood  -Hemoglobin this morning is pending     Aortic stenosis S/P TAVR  Coronary atherosclerosis S/P CABG 2002  Chronic systolic and diastolic CHF  S/p pacemaker  HTN  Mild pulmonary hypertension  Last echo 6/20/22 EF 45-50%. Left ventricular diastolic function is  indeterminate. Mid and basal inferior hypokinesis.  -strict I&Os, daily wgt  -Continue PTA metoprolol, Imdur  - PTA torsemide and hydralazine currently on hold due to possible hemodynamic instability with bleed as above  -Blood pressure seems to be stable and elevated currently  -Will on hold PTA torsemide and hydralazine  -monitor and adjust medication as needed      Atrial Fibrillation  -Continue home metoprolol, heart rate seems to be controlled  -Currently dabigatran on hold due to  bleed as  above  -Resumption once hemoglobin stable and once okay by surgery     CKD 3  Baseline creatinine appears to be between 1.5-1.9.  GFR in the 30s/40s range.  Currently creatinine appears to be in baseline range.  -PTA torsemide has been resumed  -avoid nephrotoxin medication   Outpatient follow-up     Chronic thrombocytopenia  PC was in normal range end of last year but he has had history of thrombocytopenia.  Currently in the low 110s.  -Continue to monitor intermittently.  -Dabigatran on hold as above     HLD  -statin on hold till improved oral intake     Hypoxia, likely due to postop atelectasis, resolved  Was on 1-2 L NC O2 immediately postop.  Likely atelectasis.  Chest x-ray with no acute findings.  -Patient has been on room air     Asthma  -no active issue  -resume home inhalers  -as needed respiratory tx     Hx benign neoplasm of colon  -Noted     H/o esophageal ulcer  GERD  -Continue PTA PPI     Diet: Room Service  Low Fiber Diet    DVT Prophylaxis: Pneumatic Compression Devices.  Pradaxa currently on hold due to above  Pak Catheter: Not present  Lines: None     Cardiac Monitoring: None    Clinically Significant Risk Factors                                Disposition Plan     Expected Discharge Date: 01/20/2023      Destination: home;home with family  Discharge Comments: 1/18 blood transfusion  Need stable Hbg          Tiffany Humphrey MD  Hospitalist Service  [facility]  Securely message with [employer] (more info)  Text page via [employer] Paging/Directory   ______________________________________________________________________    Interval History   Communicated with the patient with the help of .  Patient with no new complaints.  Denies any dizziness or lightheadedness.  Patient had a small bowel movement earlier today per nursing with dark blood, likely old.  Hemoglobin this morning is pending.  Patient wants to be discharged.    Physical Exam   Vital Signs: Temp:  98.1  F (36.7  C) Temp src: Oral BP: (!) 153/65 Pulse: 64   Resp: 16 SpO2: 94 % O2 Device: None (Room air)    Weight: 152 lbs 0 oz    Exam:  Constitutional: Awake, alert and no distress. Appears comfortable  Head: Normocephalic. No masses, lesions, tenderness or abnormalities  ENT: ENT exam normal, no neck nodes or sinus tenderness  Cardiovascular: RRR.  2+ murmurs, no rubs or JVD  Respiratory: Normal WOB,b/l equal air entry, no wheezes or crackles   Gastrointestinal: Abdomen soft, surgical incision looks clean, dry and approximated, periincisional tenderness present. BS normal. No masses, organomegaly  : Deferred  Extremities : No edema , no clubbing or cyanosis    Neurologic: Cranial nerves II-XII grossly intact , power symmetrical, Reflexes normal and symmetric. Sensation grossly WNL.      Medical Decision Making       36 MINUTES SPENT BY ME on the date of service doing chart review, history, exam, documentation & further activities per the note.      Data         Imaging results reviewed over the past 24 hrs:   No results found for this or any previous visit (from the past 24 hour(s)).  Recent Labs   Lab 01/19/23  0721 01/18/23  0739 01/17/23  0534 01/16/23  0801 01/16/23  0732 01/15/23  0650 01/15/23  0529 01/14/23  0703 01/13/23  1415   WBC  --   --   --  4.1  --  4.3  --  5.7 5.5   HGB 8.4* 6.9* 7.5* 8.1*  --  8.6*  --  10.3* 11.5*   MCV  --   --   --  69*  --  68*  --  70* 68*   PLT  --   --   --  119*  --  114*  --  117* 138*   NA  --  135*  --   --  140 140  --  141 140   POTASSIUM  --  4.2  --   --  4.9 4.8  --  4.9 4.7   CHLORIDE  --  99  --   --  108 106  --  105 104   CO2  --  26  --   --  27 26  --  28 28   BUN  --  42.6*  --   --  77* 73*  --  52* 48*   CR  --  1.37*  --   --  1.88* 2.10*  --  1.94* 1.94*   ANIONGAP  --  10  --   --  5 8  --  8 8   AYALA  --  8.9  --   --  8.9 8.7  --  9.1 10.1   GLC  --  96  --   --  87 101*   < > 131* 141*   ALBUMIN  --   --   --   --   --   --   --   --  3.8    PROTTOTAL  --   --   --   --   --   --   --   --  8.2   BILITOTAL  --   --   --   --   --   --   --   --  0.7   ALKPHOS  --   --   --   --   --   --   --   --  89   ALT  --   --   --   --   --   --   --   --  24   AST  --   --   --   --   --   --   --   --  24   LIPASE  --   --   --   --   --   --   --   --  67*    < > = values in this interval not displayed.      Levator Labii Superioris Units: 0

## 2023-01-20 NOTE — PROGRESS NOTES
Surgery Update    Hgb decreased slightly. Will keep pt another day to ensure stable prior to discharge. Recheck in am.     Taylor Summers MD  [facility]  [phone]

## 2023-01-21 VITALS
WEIGHT: 152 LBS | HEART RATE: 67 BPM | RESPIRATION RATE: 16 BRPM | OXYGEN SATURATION: 96 % | TEMPERATURE: 98.2 F | SYSTOLIC BLOOD PRESSURE: 153 MMHG | BODY MASS INDEX: 24.43 KG/M2 | DIASTOLIC BLOOD PRESSURE: 73 MMHG

## 2023-01-21 LAB
ERYTHROCYTE [DISTWIDTH] IN BLOOD BY AUTOMATED COUNT: 20.4 % (ref 10–15)
HCT VFR BLD AUTO: 25.4 % (ref 40–53)
HGB BLD-MCNC: 8 G/DL (ref 13.3–17.7)
MCH RBC QN AUTO: 21.4 PG (ref 26.5–33)
MCHC RBC AUTO-ENTMCNC: 31.5 G/DL (ref 31.5–36.5)
MCV RBC AUTO: 68 FL (ref 78–100)
PLATELET # BLD AUTO: 139 10E3/UL (ref 150–450)
RBC # BLD AUTO: 3.73 10E6/UL (ref 4.4–5.9)
WBC # BLD AUTO: 3.9 10E3/UL (ref 4–11)

## 2023-01-21 PROCEDURE — 250N000013 HC RX MED GY IP 250 OP 250 PS 637: Performed by: HOSPITALIST

## 2023-01-21 PROCEDURE — 94640 AIRWAY INHALATION TREATMENT: CPT

## 2023-01-21 PROCEDURE — 99232 SBSQ HOSP IP/OBS MODERATE 35: CPT | Performed by: INTERNAL MEDICINE

## 2023-01-21 PROCEDURE — 250N000013 HC RX MED GY IP 250 OP 250 PS 637: Performed by: PHYSICIAN ASSISTANT

## 2023-01-21 PROCEDURE — 250N000009 HC RX 250: Performed by: PHYSICIAN ASSISTANT

## 2023-01-21 PROCEDURE — 999N000157 HC STATISTIC RCP TIME EA 10 MIN

## 2023-01-21 PROCEDURE — 36415 COLL VENOUS BLD VENIPUNCTURE: CPT | Performed by: SURGERY

## 2023-01-21 PROCEDURE — 85027 COMPLETE CBC AUTOMATED: CPT | Performed by: SURGERY

## 2023-01-21 RX ORDER — SENNOSIDES A AND B 8.6 MG/1
1 TABLET, FILM COATED ORAL 2 TIMES DAILY PRN
Qty: 30 TABLET | Refills: 0 | Status: SHIPPED | OUTPATIENT
Start: 2023-01-21 | End: 2023-02-28

## 2023-01-21 RX ORDER — PANTOPRAZOLE SODIUM 40 MG/1
40 TABLET, DELAYED RELEASE ORAL
Status: DISCONTINUED | OUTPATIENT
Start: 2023-01-21 | End: 2023-01-21 | Stop reason: HOSPADM

## 2023-01-21 RX ADMIN — ACETAMINOPHEN 650 MG: 325 TABLET, FILM COATED ORAL at 02:36

## 2023-01-21 RX ADMIN — HYDRALAZINE HYDROCHLORIDE 12.5 MG: 25 TABLET, FILM COATED ORAL at 08:35

## 2023-01-21 RX ADMIN — PANTOPRAZOLE SODIUM 40 MG: 40 TABLET, DELAYED RELEASE ORAL at 06:42

## 2023-01-21 RX ADMIN — IPRATROPIUM BROMIDE AND ALBUTEROL SULFATE 3 ML: 2.5; .5 SOLUTION RESPIRATORY (INHALATION) at 07:37

## 2023-01-21 RX ADMIN — METOPROLOL TARTRATE 25 MG: 25 TABLET, FILM COATED ORAL at 08:35

## 2023-01-21 RX ADMIN — TORSEMIDE 10 MG: 10 TABLET ORAL at 08:35

## 2023-01-21 RX ADMIN — MONTELUKAST 10 MG: 10 TABLET, FILM COATED ORAL at 08:35

## 2023-01-21 RX ADMIN — ISOSORBIDE MONONITRATE 30 MG: 30 TABLET, EXTENDED RELEASE ORAL at 08:35

## 2023-01-21 RX ADMIN — BUDESONIDE 0.5 MG: 0.5 INHALANT RESPIRATORY (INHALATION) at 07:37

## 2023-01-21 ASSESSMENT — ACTIVITIES OF DAILY LIVING (ADL)
ADLS_ACUITY_SCORE: 31
ADLS_ACUITY_SCORE: 30
ADLS_ACUITY_SCORE: 31
ADLS_ACUITY_SCORE: 31

## 2023-01-21 NOTE — PROGRESS NOTES
[facility]    General Surgery  Daily Progress Note       Assessment and Plan:   Shiraz Ingram is a 88 year old male admitted with aortic stenosis, atrial fibrillation, CKD, asthma and chronic anemia s/p emergent open repair of strangulated left femoral hernia with short segment small bowel resection on the evening of 1/13/23.    PLAN:  - Tolerating low fiber diet. Saline locked.   - Small black GM, on protonix for GI prophylaxis.  - Hgb stable at 8.0 this morning. Okay to resume pradaxa.  - Pain controlled with tylenol.  - Senokot BID prn and miralax daily prn.  - Ambulate QID to assist with bowel function, PCDs for DVT prophylaxis.   - Encourage deep breathe and IS.   - Appreciate medical management by hospitalist team.    DISPOSITION:  - Discharge home today. Resume pradaxa and aspirin.  - Needs PCP follow up and Hgb check in 1 week.  - Follow up by video phone call in approximately 2 weeks.        Interval History:   Shiraz Ingram is seen on surgical rounds with . He denies complaints. He is tolerating low fiber diet and having daily bowel movements. He endorses flatus with ambulation. He denies abdominal bloating and pain. He is eager for discharge home. Hypertensive, vitals otherwise wnl. Hgb stable.         Physical Exam:   Temp: 98.2  F (36.8  C) Temp src: Oral BP: (!) 153/73 Pulse: 67   Resp: 16 SpO2: 96 % O2 Device: None (Room air)      I/O last 3 completed shifts:  In: 960 [P.O.:960]  Out: 1525 [Urine:1525]    GENERAL: VS reviewed, alert, oriented, no acute distress  LUNGS: Normal respiratory effort, no wheezing  ABDOMEN:  Soft, nontender, non-distended and good bowel sounds  INCISION: Steris in place. Incision is clean, dry, and intact. No surrounding erythema, mild ecchymosis.  EXTREMITIES: Moving all extremities, no gross deformities  NEUROLOGICAL: Grossly non-focal, mood & affect appropriate    Data   Recent Labs   Lab 01/21/23  0811 01/20/23  1129 01/19/23  0721  01/18/23  0739 01/17/23  0534 01/16/23  0801 01/16/23  0732 01/15/23  0650   WBC 3.9*  --   --   --   --  4.1  --  4.3   HGB 8.0* 7.3* 8.4* 6.9*   < > 8.1*  --  8.6*   MCV 68*  --   --   --   --  69*  --  68*   *  --   --   --   --  119*  --  114*   NA  --   --   --  135*  --   --  140 140   POTASSIUM  --   --   --  4.2  --   --  4.9 4.8   CHLORIDE  --   --   --  99  --   --  108 106   CO2  --   --   --  26  --   --  27 26   BUN  --   --   --  42.6*  --   --  77* 73*   CR  --   --   --  1.37*  --   --  1.88* 2.10*   ANIONGAP  --   --   --  10  --   --  5 8   AYALA  --   --   --  8.9  --   --  8.9 8.7   GLC  --   --   --  96  --   --  87 101*    < > = values in this interval not displayed.       Loren Wilde PA-C

## 2023-01-21 NOTE — PLAN OF CARE
Goal Outcome Evaluation: POD 7 lap hernia repair and bowel resection. A&Ox4, VSS except HTN on RA. Speaks  or writing used.  PIV SL. Tolerating low fiber diet. SBA w/ WK/GB, urinal at bedside. L abd incision approximated w/ steri strips and open to air. Small black BM this shift. Hgb dropped from 8.4 to 7.3.  Recheck tomorrow if remains same or improved will discharge to home tomorrow.

## 2023-01-21 NOTE — DISCHARGE SUMMARY
[facility] Discharge Summary    Shiraz Ingram MRN# [id number]   Age: 88 year old YOB: 1934     Date of Admission:  1/13/2023  Date of Discharge:  1/21/2023  Admitting Provider:  Dwayne Russell MD  Discharge Provider:  Loren Wilde PA-C  Discharging Service: General Surgery     Primary Provider: Dany Rodriguez  Primary Care Physician Phone Number: [phone]          Admission Diagnoses:   Principle Diagnosis: Small bowel obstruction (H) [K56.609]  Incarcerated hernia [K46.0]  Secondary Diagnoses:          Discharge Diagnosis:     Incarcerated hernia          Procedures:     Procedure(s): open repair of strangulated left femoral hernia with short segment small bowel resection             Discharge Medications:     Current Discharge Medication List      START taking these medications    Details   senna (SENOKOT) 8.6 MG tablet Take 1 tablet by mouth 2 times daily as needed for constipation (take as needed to have 1 bowel movement a day for 1 week)  Qty: 30 tablet, Refills: 0    Associated Diagnoses: Postoperative state         CONTINUE these medications which have NOT CHANGED    Details   acetaminophen (TYLENOL) 500 MG tablet Take 1,000 mg by mouth every 8 hours as needed      albuterol (PROAIR HFA/PROVENTIL HFA/VENTOLIN HFA) 108 (90 Base) MCG/ACT inhaler INHALE 1 TO 2 PUFFS BY MOUTH EVERY 4 TO 6 HOURS AS NEEDED  Qty: 18 g, Refills: 2    Comments: Pharmacy may dispense brand covered by insurance (Proair, or proventil or ventolin or generic albuterol inhaler)  Associated Diagnoses: Moderate persistent asthma without complication      aspirin (ASA) 81 MG EC tablet Take 81 mg by mouth 2 times daily      budesonide (PULMICORT) 0.5 MG/2ML nebulizer solution Take 2 mLs (0.5 mg) by nebulization 2 times daily  Qty: 120 mL, Refills: 0    Associated Diagnoses: Moderate persistent asthma      calcium carbonate 600 mg-vitamin D 400 units (CALTRATE) 600-400 MG-UNIT per tablet Take 1  tablet by mouth daily      dabigatran ANTICOAGULANT (PRADAXA) 150 MG capsule Take 1 capsule (150 mg) by mouth 2 times daily  Qty: 180 capsule, Refills: 3    Associated Diagnoses: Coronary artery disease involving native coronary artery of native heart without angina pectoris      hydrALAZINE (APRESOLINE) 25 MG tablet Take 0.5 tablets (12.5 mg) by mouth 3 times daily  Qty: 135 tablet, Refills: 3    Associated Diagnoses: Acute on chronic congestive heart failure, unspecified heart failure type (H)      ipratropium - albuterol 0.5 mg/2.5 mg/3 mL (DUONEB) 0.5-2.5 (3) MG/3ML nebulization Inhale 1 vial (3 mLs) into the lungs 4 times daily  Qty: 360 mL, Refills: 11    Associated Diagnoses: Moderate persistent asthma, uncomplicated      isosorbide mononitrate (IMDUR) 30 MG 24 hr tablet Take 1 tablet (30 mg) by mouth daily  Qty: 90 tablet, Refills: 3    Associated Diagnoses: Acute on chronic congestive heart failure, unspecified heart failure type (H)      magnesium chloride (MAG64) 535 (64 Mg) MG TBEC CR tablet Take 1 tablet (535 mg) by mouth 2 times daily  Qty: 180 tablet, Refills: 3    Associated Diagnoses: Acute diastolic heart failure (H)      metoprolol tartrate (LOPRESSOR) 25 MG tablet Take 1 tablet (25 mg) by mouth 2 times daily  Qty: 180 tablet, Refills: 1    Associated Diagnoses: Hypertension goal BP (blood pressure) < 140/90      montelukast (SINGULAIR) 10 MG tablet Take 1 tablet (10 mg) by mouth daily  Qty: 90 tablet, Refills: 1    Associated Diagnoses: Moderate persistent asthma without complication      multivitamin, therapeutic (THERA-VIT) TABS tablet Take 1 tablet by mouth 2 times daily      nitroGLYcerin (NITROSTAT) 0.4 MG sublingual tablet For chest pain place 1 tablet under the tongue every 5 minutes for 3 doses. If symptoms persist 5 minutes after 1st dose call 911.  Qty: 12 tablet, Refills: 0    Comments: Future refills by PCP Dr. Dany Rodriguez MD with phone number [phone].  Associated  Diagnoses: Severe aortic stenosis; Complete heart block (H); Permanent atrial fibrillation (H); Thrombus of left atrial appendage      pantoprazole (PROTONIX) 40 MG EC tablet TAKE 1 TABLET BEFORE MEALS TWICE A DAY Strength: 40 mg  Qty: 180 tablet, Refills: 1    Associated Diagnoses: Ulcer of esophagus with bleeding      simvastatin (ZOCOR) 40 MG tablet Take 1 tablet (40 mg) by mouth At Bedtime  Qty: 90 tablet, Refills: 3    Associated Diagnoses: Hyperlipidemia with target LDL less than 100      torsemide (DEMADEX) 5 MG tablet Patient taking 10 mg in the morning. Take additional 5mg if weight gain of 2-3 lbs in 1 day.  Qty: 270 tablet, Refills: 1    Associated Diagnoses: Acute diastolic heart failure (H); S/P TAVR (transcatheter aortic valve replacement); Hyperlipidemia with target LDL less than 100                 Allergies:       No Known Allergies          Brief History of Illness:    Reason for your hospital stay      Femoral hernia repair and small bowel resection             Shiraz Ingram is an 88 year old male with PMHx of anemia, benign neoplasm of colon, aortic stenosis, pacemaker, CKD, Coronary atherosclerosis, HLD, HTN, asthma, atrial fibrillation, and CHF.  He presented to ED on 1/13 for abdominal pain.  Patient's family member indicated that the patient fell on the ice approximately a week ago and was seen in the emergency department for low back pain.  He had the recent onset of right-sided abdominal pain had some numbness and weakness in his legs.  Presented with these complaints and had not had a bowel movement in the last 2 days.  Developed left lower quadrant tenderness and a painful bulge.  Denied any nausea or vomiting.  Was evaluated in the emergency department including a CT scan of the abdomen and pelvis.  This revealed a bowel containing left inguinal hernia with signs of possible incarceration and subsequent small bowel obstruction.  Attempts to reduce the hernia in the ER were unsuccessful  and a general surgery consultation was called.  Patient does have a prior history of an elective right inguinal hernia repair in 2016.  Unclear as to how long this hernia has been present.  It was recommended that he undergo emergent open repair of strangulated left femoral hernia.    After discussing the risks, benefits, and possible complications, informed consent was obtained and the patient underwent emergent open repair of strangulated left femoral hernia with short segment small bowel resection.  There were no complications.  Please see the Operative Report for full details.           Hospital Course:   Shiraz Ingram was admitted postoperatively.  He had slow return of bowel function, NG was removed on 1/15 and his diet was slowly advanced.  He had slowly drifting Hgb from his anemic baseline and his anticoagulation was held.  He had some maroon stools which ceased.  Hgb stabilized and was resumed on 1/21.  Today he is having normal bowel movements, tolerating diet, pain controlled with oral analgesia, ambulating and voiding well.  Hospitalist team followed him during his hospitalization for medical management given MHx of anemia, benign neoplasm of colon, aortic stenosis, pacemaker, CKD, Coronary atherosclerosis, HLD, HTN, asthma, atrial fibrillation, and CHF.     On the date of discharge, the patient was discharged to home in stable condition.  He verbalized understanding of all discharge instructions and felt comfortable with the discharge plan.  He was asked to call with any further questions or concerns.         Condition on Discharge:        Discharge condition: Stable   Discharge vitals: Blood pressure (!) 153/73, pulse 67, temperature 98.2  F (36.8  C), temperature source Oral, resp. rate 16, weight 68.9 kg (152 lb), SpO2 96 %.           Discharge Disposition:     Discharged to home          Discharge Instructions and Follow-Up:      Shiraz Ingram needs PCP follow up and Hgb check in 1 week. He will  follow up with General Surgery by video phone call in approximately 2 weeks.      Loren Wilde PA-C  [facility]  [phone]

## 2023-01-21 NOTE — DISCHARGE INSTRUCTIONS
[facility]  Discharge Instructions: Post-Operative Bowel Surgery    ACTIVITY  Expect to feel tired after your surgery.  This will gradually resolve.    Take frequent, short walks and increase your activity gradually.    Avoid strenuous physical activity or heavy lifting greater than 15 lbs. for 4 weeks.  You may climb stairs.    You may drive without restrictions when you are not using any prescription pain medication and feel comfortable in a car.  You may return to work/school when you are comfortable without any prescription pain medication.    WOUND CARE  You may shower, but do not soak your incision(s) in a tub or pool for 2 weeks.  Pat your incisions dry after your shower and leave them open to air.  Re-apply dressing (Band-Aids or gauze/tape) as needed for comfort or drainage.  You may have steri-strips (looks like white tape) or staples at your incisions.  You may peel off the steri-strips 2 weeks after your surgery.  Do not apply any lotions, creams, or ointments to your incisions.  A ridge under your incisions is normal and will gradually resolve.    DIET  You may eat a regular diet. You may find your appetite to be diminished briefly after surgery.  You may take nutritional supplement shakes if you are able.   Drink plenty of fluids to stay hydrated.    PAIN  Expect some tenderness and discomfort at the incision site(s).  Expect gradual resolution of your pain over several days.  You may take ibuprofen with food (unless you have been told not to) or acetaminophen/Tylenol instead of or in addition to your prescribed pain medication.  You may take Tylenol 500 - 1000 mg every 6 hours as needed for pain - do not exceed 4,000 mg of tylenol (acetaminophen) from all sources in 24 hours.  You may take Ibuprofen 400 - 600 mg every 6 hours as needed for pain - do not exceed 2,400 mg of ibuprofen from all sources in 24 hours. Do not use Ibuprofen for any longer than necessary or take if  you have been told not to by another medical provider.  You may alternate Ibuprofen and Tylenol every 3 hours as needed for pain. Reserve the narcotic prescription for refractory pain.  Do not drink alcohol or drive while you are taking pain medications.  You may apply ice to your incisions in 20 minute intervals as needed for the next 24-48 hours.  After that time, consider switching to heat if you prefer.    EXPECTATIONS  You should take your prescribed stool softener/stimulant, Senna, 1-2 times a day with plenty of water.  To have at least 1 bowel movement a day for the first week following discharge.  You may increase and decrease the dose of this.  You may need to take a mild over the counter laxative, such as Miralax or a suppository, if still not having daily bowel movements.  You may discontinue these medications once you are having regular bowel movements.    RETURN APPOINTMENT  We will video call you in approximately 2 weeks.  Please call our office at [phone] if you have questions, concerns, or would like to be seen in person. We are located at [address] [place] [address].    CALL OUR OFFICE [phone] IF YOU HAVE:   Chills or fever above 101  F.  Increased redness, warmth, or drainage at your incisions.  Significant bleeding.  Pain not relieved by your pain medication or rest.  Increasing pain after the first 48 hours.  Any other concerns or questions.

## 2023-01-21 NOTE — PROGRESS NOTES
[facility]    Medicine Progress Note - Hospitalist Service    Date of Admission:  1/13/2023    Assessment & Plan   Shiraz Ingram is a 88 year old male admitted on 1/13/2023  who presented with abdomen pain. Patient is S/P repair of incarcerated left femoral hernia with small bowel resection on 1/13/23 .PMHx of anemia, benign neoplasm of colon, aortic stenosis, pacemaker, CKD, Coronary atherosclerosis, HLD, HTN, asthma, atrial Fibrillation, and CHF. Consulted for medical management.      Acute abdomen pain   S/P repair of incarcerated left femoral hernia with small bowel resection on 1/13/23  -defer postop management to general surgery including pain control, wound care, disposition and follow-up  -Tolerating low fiber diet currently    Anemia of chronic disease  Acute postop blood loss anemia  -Admission hemoglobin 11.5, baseline hemoglobin in the 10s range, patient had multiple loose stools with blood noted on 1/18  -PTA on dabigatran for A. fib as below  -PTA dabigatran currently on hold  -Status post PRBC transfusion on 1/18 with a hemoglobin gissell of 6.9  -Hemoglobin post transfusion went up to 8.4, yesterday was 7.3, no further bleeding and hemoglobin stable now and improved this morning to 8     Aortic stenosis S/P TAVR  Coronary atherosclerosis S/P CABG 2002  Chronic systolic and diastolic CHF  S/p pacemaker  HTN  Mild pulmonary hypertension  Last echo 6/20/22 EF 45-50%. Left ventricular diastolic function is  indeterminate. Mid and basal inferior hypokinesis.  -strict I&Os, daily wgt  -Continue PTA metoprolol, Imdur, torsemide and hydralazine  -Outpatient follow-up with PCP      Atrial Fibrillation  -Continue home metoprolol, heart rate seems to be controlled  -Currently dabigatran on hold due to  bleed as above  -Defer to surgery about appropriate timing for initiation.  If patient gets discharged to resume Pradaxa from today would recommend hemoglobin through PCP in 3 to 4 days to  make sure of stability or earlier if evidence of gross bleeding.     CKD 3  Baseline creatinine appears to be between 1.5-1.9.  GFR in the 30s/40s range.  Currently creatinine appears to be in baseline range.  -avoid nephrotoxin medication   Outpatient follow-up     Chronic thrombocytopenia  PC was in normal range end of last year but he has had history of thrombocytopenia.  Currently in the low 110s.  -Continue to monitor intermittently.  -Dabigatran on hold as above     HLD  -statin on hold till improved oral intake     Hypoxia, likely due to postop atelectasis, resolved  Was on 1-2 L NC O2 immediately postop.  Likely atelectasis.  Chest x-ray with no acute findings.  -Patient has been on room air     Asthma  -no active issue  -resume home inhalers  -as needed respiratory tx     Hx benign neoplasm of colon  -Noted     H/o esophageal ulcer  GERD  -Continue PTA PPI     Diet: Room Service  Low Fiber Diet    DVT Prophylaxis: Pneumatic Compression Devices.  Pradaxa currently on hold due to above  Pak Catheter: Not present  Lines: None     Cardiac Monitoring: None    Clinically Significant Risk Factors                                Disposition Plan      Expected Discharge Date: 01/21/2023      Destination: home;home with family  Discharge Comments: stable Hbg 8.0          Tiffany Humphrey MD  Hospitalist Service  [facility]  Securely message with [employer] (more info)  Text page via [email or web address] Paging/Directory   ______________________________________________________________________    Interval History   Communicated with the patient with the help of .  Wants to be discharged today.  Denied any other complaints.  No new nursing concerns.  Physical Exam   Vital Signs: Temp: 98.2  F (36.8  C) Temp src: Oral BP: (!) 153/73 Pulse: 67   Resp: 16 SpO2: 96 % O2 Device: None (Room air)    Weight: 152 lbs 0 oz    Exam:  Constitutional: Awake, alert and no distress. Appears  comfortable  Head: Normocephalic. No masses, lesions, tenderness or abnormalities  ENT: ENT exam normal, no neck nodes or sinus tenderness  Cardiovascular: RRR.  2+ murmurs, no rubs or JVD  Respiratory: Normal WOB,b/l equal air entry, no wheezes or crackles   Gastrointestinal: Abdomen soft, surgical incision looks clean, dry and approximated, periincisional tenderness present. BS normal. No masses, organomegaly  : Deferred  Extremities : No edema , no clubbing or cyanosis    Neurologic: Cranial nerves II-XII grossly intact , power symmetrical, Reflexes normal and symmetric. Sensation grossly WNL.      Medical Decision Making       40 MINUTES SPENT BY ME on the date of service doing chart review, history, exam, documentation & further activities per the note.      Data     I have personally reviewed the following data over the past 24 hrs:    3.9 (L)  \   8.0 (L)   / 139 (L)     N/A N/A N/A /  N/A   N/A N/A N/A \       Imaging results reviewed over the past 24 hrs:   No results found for this or any previous visit (from the past 24 hour(s)).  Recent Labs   Lab 01/21/23  0811 01/20/23  1129 01/19/23  0721 01/18/23  0739 01/17/23  0534 01/16/23  0801 01/16/23  0732 01/15/23  0650   WBC 3.9*  --   --   --   --  4.1  --  4.3   HGB 8.0* 7.3* 8.4* 6.9*   < > 8.1*  --  8.6*   MCV 68*  --   --   --   --  69*  --  68*   *  --   --   --   --  119*  --  114*   NA  --   --   --  135*  --   --  140 140   POTASSIUM  --   --   --  4.2  --   --  4.9 4.8   CHLORIDE  --   --   --  99  --   --  108 106   CO2  --   --   --  26  --   --  27 26   BUN  --   --   --  42.6*  --   --  77* 73*   CR  --   --   --  1.37*  --   --  1.88* 2.10*   ANIONGAP  --   --   --  10  --   --  5 8   AYALA  --   --   --  8.9  --   --  8.9 8.7   GLC  --   --   --  96  --   --  87 101*    < > = values in this interval not displayed.

## 2023-01-21 NOTE — PLAN OF CARE
Goal Outcome Evaluation:      Plan of Care Reviewed With: patient    Overall Patient Progress: improvingOverall Patient Progress: improving     POD# 8 lap hernia repair and bowel resection. A&Ox4, VSS on RA except HTN. Speaks sign language, writing used.  PIV SL. Tolerating low fiber diet. SBA w/ WK/GB. Uses urinal at bedside, adequate UOP. Normoactive BS, 1 small black BM noted during this shift. L abd incision approximated w/ steri strips and open to air. Discharge pending hemoglobin recheck. Continue to monitor.

## 2023-01-21 NOTE — PROGRESS NOTES
Discharge Note    Patient discharged to home via private vehicle  accompanied by son.  IV: Discontinued  Prescriptions filled and given to patient/family.   Belongings reviewed and sent with family.   Home medications returned to patient: NA  Equipment sent with: patient.   patient verbalizes understanding of discharge instructions. AVS given to patient.

## 2023-01-24 ENCOUNTER — TELEPHONE (OUTPATIENT)
Dept: FAMILY MEDICINE | Facility: CLINIC | Age: 88
End: 2023-01-24
Payer: COMMERCIAL

## 2023-01-24 NOTE — TELEPHONE ENCOUNTER
MTM referral from: Transitions of Care (recent hospital discharge or ED visit)    MTM referral outreach attempt #2 on January 24, 2023 at 2:12 PM      Outcome: Patient not reachable after several attempts, will route to [facility] Pharmacist/Provider as an FYI.  [facility] scheduling number is [phone].  Thank you for the referral.    Clovis Miller, [facility]     Patient has [facility] Part D coverage

## 2023-01-26 ENCOUNTER — OFFICE VISIT (OUTPATIENT)
Dept: FAMILY MEDICINE | Facility: CLINIC | Age: 88
End: 2023-01-26
Payer: COMMERCIAL

## 2023-01-26 VITALS
DIASTOLIC BLOOD PRESSURE: 73 MMHG | BODY MASS INDEX: 25.39 KG/M2 | TEMPERATURE: 98 F | WEIGHT: 158 LBS | OXYGEN SATURATION: 96 % | HEART RATE: 71 BPM | SYSTOLIC BLOOD PRESSURE: 144 MMHG | RESPIRATION RATE: 22 BRPM

## 2023-01-26 DIAGNOSIS — I48.20 CHRONIC ATRIAL FIBRILLATION (H): ICD-10-CM

## 2023-01-26 DIAGNOSIS — N18.32 STAGE 3B CHRONIC KIDNEY DISEASE (H): ICD-10-CM

## 2023-01-26 DIAGNOSIS — D61.818 PANCYTOPENIA (H): ICD-10-CM

## 2023-01-26 DIAGNOSIS — Z87.19 HISTORY OF INGUINAL HERNIA: ICD-10-CM

## 2023-01-26 DIAGNOSIS — J96.01 ACUTE RESPIRATORY FAILURE WITH HYPOXIA (H): ICD-10-CM

## 2023-01-26 DIAGNOSIS — J45.40 MODERATE PERSISTENT ASTHMA WITHOUT COMPLICATION: ICD-10-CM

## 2023-01-26 DIAGNOSIS — D69.6 THROMBOCYTOPENIA, UNSPECIFIED (H): ICD-10-CM

## 2023-01-26 DIAGNOSIS — Z87.19 H/O INGUINAL HERNIA REPAIR: Primary | ICD-10-CM

## 2023-01-26 DIAGNOSIS — I50.9 ACUTE CONGESTIVE HEART FAILURE, UNSPECIFIED HEART FAILURE TYPE (H): ICD-10-CM

## 2023-01-26 DIAGNOSIS — Z98.890 H/O INGUINAL HERNIA REPAIR: Primary | ICD-10-CM

## 2023-01-26 DIAGNOSIS — D63.8 ANEMIA OF CHRONIC DISEASE: ICD-10-CM

## 2023-01-26 LAB — HGB BLD-MCNC: 8.5 G/DL (ref 13.3–17.7)

## 2023-01-26 PROCEDURE — 36415 COLL VENOUS BLD VENIPUNCTURE: CPT | Performed by: FAMILY MEDICINE

## 2023-01-26 PROCEDURE — 99495 TRANSJ CARE MGMT MOD F2F 14D: CPT | Performed by: FAMILY MEDICINE

## 2023-01-26 PROCEDURE — 85018 HEMOGLOBIN: CPT | Performed by: FAMILY MEDICINE

## 2023-01-26 PROCEDURE — T1013 SIGN LANG/ORAL INTERPRETER: HCPCS | Mod: U3

## 2023-01-26 NOTE — PROGRESS NOTES
Assessment & Plan     (Z98.890,  Z87.19) H/O inguinal hernia repair  (primary encounter diagnosis)  (Z87.19) History of inguinal hernia  Comment: incarcerated  Plan: doing very well, improving, Hg stabilized    (J96.01) Acute respiratory failure with hypoxia (H)  Comment: oxygen sat normal today  Plan: monitor    (I50.9) Acute congestive heart failure, unspecified heart failure type (H)  Comment: stable, no signs of acute worsening      (D63.8) Anemia of chronic disease (baseline Hgb 9.0-10.5)  Comment: hg improving    (D61.818) Pancytopenia (H)  (D69.6) Thrombocytopenia, unspecified (H)  Comment: stable      (I48.20) Chronic atrial fibrillation (H)  Comment: well controlled  Plan:    (N18.32) Stage 3b chronic kidney disease (H)  Comment:   GFR Estimate   Date Value Ref Range Status   01/18/2023 50 (L) >60 mL/min/1.73m2 Final     Comment:     Effective December 21, 2021 eGFRcr in adults is calculated using the 2021 CKD-EPI creatinine equation which includes age and gender ( et al., NEJM, DOI: 10.1056/IURJaj9211872)   01/16/2023 34 (L) >60 mL/min/1.73m2 Final     Comment:     Effective December 21, 2021 eGFRcr in adults is calculated using the 2021 CKD-EPI creatinine equation which includes age and gender ( et al., NEJM, DOI: 10.1056/JLLLlf6349233)   01/15/2023 30 (L) >60 mL/min/1.73m2 Final     Comment:     Effective December 21, 2021 eGFRcr in adults is calculated using the 2021 CKD-EPI creatinine equation which includes age and gender ( et al., NEJM, DOI: 10.1056/BCTHhr9038536)   06/07/2021 35 (L) >60 mL/min/[1.73_m2] Final     Comment:     Non  GFR Calc  Starting 12/18/2018, serum creatinine based estimated GFR (eGFR) will be   calculated using the Chronic Kidney Disease Epidemiology Collaboration   (CKD-EPI) equation.     03/22/2021 37 (L) >60 mL/min/[1.73_m2] Final     Comment:     Non  GFR Calc  Starting 12/18/2018, serum creatinine based estimated GFR (eGFR)  "will be   calculated using the Chronic Kidney Disease Epidemiology Collaboration   (CKD-EPI) equation.     03/18/2021 33 (L) >60 mL/min/[1.73_m2] Final     Comment:     Non  GFR Calc  Starting 12/18/2018, serum creatinine based estimated GFR (eGFR) will be   calculated using the Chronic Kidney Disease Epidemiology Collaboration   (CKD-EPI) equation.       GFR Estimate If Black   Date Value Ref Range Status   06/07/2021 41 (L) >60 mL/min/[1.73_m2] Final     Comment:      GFR Calc  Starting 12/18/2018, serum creatinine based estimated GFR (eGFR) will be   calculated using the Chronic Kidney Disease Epidemiology Collaboration   (CKD-EPI) equation.     03/22/2021 43 (L) >60 mL/min/[1.73_m2] Final     Comment:      GFR Calc  Starting 12/18/2018, serum creatinine based estimated GFR (eGFR) will be   calculated using the Chronic Kidney Disease Epidemiology Collaboration   (CKD-EPI) equation.     03/18/2021 38 (L) >60 mL/min/[1.73_m2] Final     Comment:      GFR Calc  Starting 12/18/2018, serum creatinine based estimated GFR (eGFR) will be   calculated using the Chronic Kidney Disease Epidemiology Collaboration   (CKD-EPI) equation.         Plan: stable    (J45.40) Moderate persistent asthma without complication  Comment: at goal today  Plan: Asthma Action Plan (AAP)                     MED REC REQUIRED  Post Medication Reconciliation Status:  Discharge medications reconciled and changed, see notes/orders    BMI:   Estimated body mass index is 25.39 kg/m  as calculated from the following:    Height as of 12/13/22: 1.68 m (5' 6.14\").    Weight as of this encounter: 71.7 kg (158 lb).           No follow-ups on file.    Kaylynn Pham MD  [facility]    Javon Weldon is a 88 year old accompanied by his son Forrest, presenting for the following health issues:  Hospital F/U      History of Present Illness       Reason for visit:  " Blood works    He eats 0-1 servings of fruits and vegetables daily.He consumes 0 sweetened beverage(s) daily.He exercises with enough effort to increase his heart rate 9 or less minutes per day.  He exercises with enough effort to increase his heart rate 7 days per week.   He is taking medications regularly.     Date of Admission:              1/13/2023  Date of Discharge:              1/21/2023  Admitting Provider:             Dwayne Russell MD  Discharge Provider:            Loren Wilde PA-C  Discharging Service:           General Surgery       Admission Diagnoses:   Principle Diagnosis: Small bowel obstruction (H) [K56.609]  Incarcerated hernia [K46.0]  Secondary Diagnoses:          Discharge Diagnosis:      Incarcerated hernia  After discussing the risks, benefits, and possible complications, informed consent was obtained and the patient underwent emergent open repair of strangulated left femoral hernia with short segment small bowel resection.  There were no complications.  Please see the Operative Report for full details.    follow up: repeat Hg  Medication added: stool softener added for one week  No other med changes  Wt Readings from Last 4 Encounters:   01/26/23 71.7 kg (158 lb)   01/15/23 68.9 kg (152 lb)   12/13/22 72.6 kg (160 lb)   10/31/22 71.7 kg (158 lb)     BP Readings from Last 3 Encounters:   01/26/23 (!) 144/73   01/21/23 (!) 153/73   01/11/23 (!) 174/72      Multiple chronic, severe disease contributing to overall morbidity including CHF, pancytopenia, hx of repiratory failure, CKD, and asthma.    He reports feeing much better. His son Romi and an  accompany him today. Son concurs that he appears to be at baseline.      Review of Systems   Constitutional, HEENT, cardiovascular, pulmonary, GI, , musculoskeletal, neuro, skin, endocrine and psych systems are negative, except as otherwise noted.      Objective    BP (!) 144/73   Pulse 71   Temp 98  F (36.7  C)  (Oral)   Resp 22   Wt 71.7 kg (158 lb)   SpO2 96%   BMI 25.39 kg/m    Body mass index is 25.39 kg/m .  Physical Exam   GENERAL: healthy, alert, no distress, frail and elderly  NECK: no adenopathy, no asymmetry, masses, or scars and thyroid normal to palpation  RESP: lungs clear to auscultation - no rales, rhonchi or wheezes  CV: regular rate and rhythm, normal S1 S2, no S3 or S4, no murmur, click or rub, no peripheral edema and peripheral pulses strong  MS: no gross musculoskeletal defects noted, no edema    Results for orders placed or performed in visit on 01/26/23 (from the past 24 hour(s))   Hemoglobin   Result Value Ref Range    Hemoglobin 8.5 (L) 13.3 - 17.7 g/dL

## 2023-01-26 NOTE — LETTER
My Asthma Action Plan    Name: Shiraz Ingram   YOB: 1934  Date: 1/27/2023   My doctor: Kaylynn Pham MD   My clinic: [facility]        My Control Medicine: Montelukast (Singulair) -  10 mg daily  My Rescue Medicine: Albuterol (Proair/Ventolin/Proventil HFA) 2-4 puffs EVERY 4 HOURS as needed. Use a spacer if recommended by your provider.  Ipratropium nebulizer solution every 4 hours prn   My Asthma Severity:   Moderate Persistent  Know your asthma triggers:   when air gets thick outside            GREEN ZONE   Good Control    I feel good    No cough or wheeze    Can work, sleep and play without asthma symptoms       Take your asthma control medicine every day.     1. If exercise triggers your asthma, take your rescue medication    15 minutes before exercise or sports, and    During exercise if you have asthma symptoms  2. Spacer to use with inhaler: If you have a spacer, make sure to use it with your inhaler             YELLOW ZONE Getting Worse  I have ANY of these:    I do not feel good    Cough or wheeze    Chest feels tight    Wake up at night   1. Keep taking your Green Zone medications  2. Start taking your rescue medicine:    every 20 minutes for up to 1 hour. Then every 4 hours for 24-48 hours.  3. If you stay in the Yellow Zone for more than 12-24 hours, contact your doctor.  4. If you do not return to the Green Zone in 12-24 hours or you get worse, start taking your oral steroid medicine if prescribed by your provider.           RED ZONE Medical Alert - Get Help  I have ANY of these:    I feel awful    Medicine is not helping    Breathing getting harder    Trouble walking or talking    Nose opens wide to breathe       1. Take your rescue medicine NOW  2. If your provider has prescribed an oral steroid medicine, start taking it NOW  3. Call your doctor NOW  4. If you are still in the Red Zone after 20 minutes and you have not reached your doctor:    Take  your rescue medicine again and    Call 911 or go to the emergency room right away    See your regular doctor within 2 weeks of an Emergency Room or Urgent Care visit for follow-up treatment.          Annual Reminders:  Meet with Asthma Educator,  Flu Shot in the Fall, consider Pneumonia Vaccination for patients with asthma (aged 19 and older).    Pharmacy:    [facility] #[id number] - [place], [place] - [address] AT [address] PHARMACY [place], [place] - [address]  [id number] [place], [place] - [address] [facility] [address] [place] [address] PHARMACY [place], [place] [address] [facility]  [id number] [place], [place] - [address] [facility]    Electronically signed by Kaylynn Pham MD   Date: 01/27/23                      Asthma Triggers  How To Control Things That Make Your Asthma Worse    Triggers are things that make your asthma worse.  Look at the list below to help you find your triggers and what you can do about them.  You can help prevent asthma flare-ups by staying away from your triggers.      Trigger                                                          What you can do   Cigarette Smoke  Tobacco smoke can make asthma worse. Do not allow smoking in your home, car or around you.  Be sure no one smokes at a child s day care or school.  If you smoke, ask your health care provider for ways to help you quit.  Ask family members to quit too.  Ask your health care provider for a referral to Quit Plan to help you quit smoking, or call 4-951-697-PLAN.     Colds, Flu, Bronchitis  These are common triggers of asthma. Wash your hands often.  Don t touch your eyes, nose or mouth.  Get a flu shot every year.     Dust Mites  These are tiny bugs that live in cloth or carpet. They are too small to see. Wash sheets and blankets in hot water every week.   Encase pillows and mattress in dust mite proof covers.  Avoid  having carpet if you can. If you have carpet, vacuum weekly.   Use a dust mask and HEPA vacuum.   Pollen and Outdoor Mold  Some people are allergic to trees, grass, or weed pollen, or molds. Try to keep your windows closed.  Limit time out doors when pollen count is high.   Ask you health care provider about taking medicine during allergy season.     Animal Dander  Some people are allergic to skin flakes, urine or saliva from pets with fur or feathers. Keep pets with fur or feathers out of your home.    If you can t keep the pet outdoors, then keep the pet out of your bedroom.  Keep the bedroom door closed.  Keep pets off cloth furniture and away from stuffed toys.     Mice, Rats, and Cockroaches   Some people are allergic to the waste from these pests.   Cover food and garbage.  Clean up spills and food crumbs.  Store grease in the refrigerator.   Keep food out of the bedroom.   Indoor Mold  This can be a trigger if your home has high moisture. Fix leaking faucets, pipes, or other sources of water.   Clean moldy surfaces.  Dehumidify basement if it is damp and smelly.   Smoke, Strong Odors, and Sprays  These can reduce air quality. Stay away from strong odors and sprays, such as perfume, powder, hair spray, paints, smoke incense, paint, cleaning products, candles and new carpet.   Exercise or Sports  Some people with asthma have this trigger. Be active!  Ask your doctor about taking medicine before sports or exercise to prevent symptoms.    Warm up for 5-10 minutes before and after sports or exercise.     Other Triggers of Asthma  Cold air:  Cover your nose and mouth with a scarf.  Sometimes laughing or crying can be a trigger.  Some medicines and food can trigger asthma.

## 2023-01-27 PROBLEM — Z98.890 H/O INGUINAL HERNIA REPAIR: Status: ACTIVE | Noted: 2023-01-27

## 2023-01-27 PROBLEM — D61.818 PANCYTOPENIA (H): Status: ACTIVE | Noted: 2023-01-27

## 2023-01-27 PROBLEM — Z87.19 H/O INGUINAL HERNIA REPAIR: Status: ACTIVE | Noted: 2023-01-27

## 2023-01-31 ENCOUNTER — OFFICE VISIT (OUTPATIENT)
Dept: CARDIOLOGY | Facility: CLINIC | Age: 88
End: 2023-01-31
Attending: INTERNAL MEDICINE
Payer: COMMERCIAL

## 2023-01-31 VITALS
HEIGHT: 66 IN | WEIGHT: 158.9 LBS | DIASTOLIC BLOOD PRESSURE: 78 MMHG | SYSTOLIC BLOOD PRESSURE: 137 MMHG | BODY MASS INDEX: 25.54 KG/M2 | HEART RATE: 64 BPM | OXYGEN SATURATION: 96 %

## 2023-01-31 DIAGNOSIS — Z95.2 S/P TAVR (TRANSCATHETER AORTIC VALVE REPLACEMENT): ICD-10-CM

## 2023-01-31 DIAGNOSIS — I48.20 CHRONIC ATRIAL FIBRILLATION (H): ICD-10-CM

## 2023-01-31 DIAGNOSIS — I50.42 CHRONIC COMBINED SYSTOLIC AND DIASTOLIC HEART FAILURE (H): ICD-10-CM

## 2023-01-31 PROCEDURE — 99214 OFFICE O/P EST MOD 30 MIN: CPT | Performed by: INTERNAL MEDICINE

## 2023-01-31 NOTE — PROGRESS NOTES
HPI and Plan:   Very pleasant 88-year-old gentleman with the following cardiac issues:  The biphasically and intermittent think that that happens all the time so she will get luanan  1. 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   2.  CKD-III; with a creatinine at baseline ~1.4.   3.  Hypertension, controlled.   4.  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  Mean gradient usually around 9 mmHg.    5.  Chronic atrial fibrillation with history of MAYA thrombus, on anticoagulation with Pradaxa.  Controlled heart rate.  6. CAD, s/p CABG. 2002 [LIMA -LAD, VG-OM and the RCA  7. Hyperlipidemia, well-controlled as of 9/2021.  8. JOSÉ ANTONIO diagnosed 2022.    He was just discharged following a brief hospitalization due to small bowel obstruction.  He had a strangulated left femoral hernia repaired.  His repair site appears clean with no hematoma.    Is doing fine at this time.  He is in a wheelchair.  He is hard of hearing but no  was present.  As I know him well we managed to get by.  He denies chest pain shortness of breath and other heart failure symptoms.  He denies bleeding    Weight is steady at around 157 pounds.  His cardiac murmur is unchanged.  He has no evidence of CHF.    His total cholesterol is 103.  Blood pressure is certainly adequate for man his age.    Overall cardiac status is stable.  We will see again in 6 months time for continued follow-up.        No orders of the defined types were placed in this encounter.      No orders of the defined types were placed in this encounter.      Encounter Diagnoses   Name Primary?     Chronic combined systolic and diastolic heart failure (H)      S/P TAVR (transcatheter aortic valve replacement)      Chronic atrial fibrillation (H)        CURRENT MEDICATIONS:  Current Outpatient Medications   Medication Sig Dispense Refill     acetaminophen (TYLENOL) 500 MG tablet Take 1,000 mg by mouth every 8 hours as needed        albuterol (PROAIR HFA/PROVENTIL HFA/VENTOLIN HFA) 108 (90 Base) MCG/ACT inhaler INHALE 1 TO 2 PUFFS BY MOUTH EVERY 4 TO 6 HOURS AS NEEDED 18 g 2     aspirin (ASA) 81 MG EC tablet Take 81 mg by mouth 2 times daily       budesonide (PULMICORT) 0.5 MG/2ML nebulizer solution Take 2 mLs (0.5 mg) by nebulization 2 times daily 120 mL 0     calcium carbonate 600 mg-vitamin D 400 units (CALTRATE) 600-400 MG-UNIT per tablet Take 1 tablet by mouth daily       dabigatran ANTICOAGULANT (PRADAXA) 150 MG capsule Take 1 capsule (150 mg) by mouth 2 times daily 180 capsule 3     hydrALAZINE (APRESOLINE) 25 MG tablet Take 0.5 tablets (12.5 mg) by mouth 3 times daily 135 tablet 3     ipratropium - albuterol 0.5 mg/2.5 mg/3 mL (DUONEB) 0.5-2.5 (3) MG/3ML nebulization Inhale 1 vial (3 mLs) into the lungs 4 times daily 360 mL 11     isosorbide mononitrate (IMDUR) 30 MG 24 hr tablet Take 1 tablet (30 mg) by mouth daily 90 tablet 3     magnesium chloride (MAG64) 535 (64 Mg) MG TBEC CR tablet Take 1 tablet (535 mg) by mouth 2 times daily 180 tablet 3     metoprolol tartrate (LOPRESSOR) 25 MG tablet Take 1 tablet (25 mg) by mouth 2 times daily 180 tablet 1     montelukast (SINGULAIR) 10 MG tablet Take 1 tablet (10 mg) by mouth daily 90 tablet 1     multivitamin, therapeutic (THERA-VIT) TABS tablet Take 1 tablet by mouth 2 times daily       nitroGLYcerin (NITROSTAT) 0.4 MG sublingual tablet For chest pain place 1 tablet under the tongue every 5 minutes for 3 doses. If symptoms persist 5 minutes after 1st dose call 911. 12 tablet 0     pantoprazole (PROTONIX) 40 MG EC tablet TAKE 1 TABLET BEFORE MEALS TWICE A DAY Strength: 40 mg 180 tablet 1     senna (SENOKOT) 8.6 MG tablet Take 1 tablet by mouth 2 times daily as needed for constipation (take as needed to have 1 bowel movement a day for 1 week) 30 tablet 0     simvastatin (ZOCOR) 40 MG tablet Take 1 tablet (40 mg) by mouth At Bedtime 90 tablet 3     torsemide (DEMADEX) 5 MG tablet Patient  taking 10 mg in the morning. Take additional 5mg if weight gain of 2-3 lbs in 1 day. (Patient taking differently: Patient taking 10 mg two times daily. Take additional 5mg if weight gain of 2-3 lbs in 1 day.) 270 tablet 1       ALLERGIES   No Known Allergies    PAST MEDICAL HISTORY:  Past Medical History:   Diagnosis Date     Allergic rhinitis, cause unspecified      Anemia, unspecified      Aortic stenosis S/P TAVR      Benign neoplasm of colon 1999    Ademonatous polyp     CHF 2nd to TAVR -- S/P Pacemaker 12/2020     CKD (chronic kidney disease) stage 3, GFR 30-59 ml/min (H) 05/12/2011     Community acquired pneumonia 9/29/2020     Coronary atherosclerosis of unspecified type of vessel, native or graft     s/p CABG 2002     Esophageal ulcer w UGI bleed 05/24/2020    Had EGD adn caurtery 5/24/20, colonoscopy with diverticulosis then     Hyperlipidemia LDL goal < 100      Hypertension goal BP (blood pressure) < 140/90      Localized osteoarthrosis not specified whether primary or secondary, lower leg     left knee     Mild persistent asthma without complication 1/11/2016     Moderate persistent asthma      Persistent atrial fibrillation (H)      Unspecified hearing loss        PAST SURGICAL HISTORY:  Past Surgical History:   Procedure Laterality Date     CARDIAC SURGERY       COLONOSCOPY N/A 5/26/2020    Procedure: COLONOSCOPY;  Surgeon: Ignacio Fournier MD;  Location:  GI     CV ANGIOGRAM CORONARY GRAFT N/A 4/24/2020    Procedure: Angiogram Coronary Graft;  Surgeon: Addison Willard MD;  Location:  HEART CARDIAC CATH LAB     CV CORONARY ANGIOGRAM N/A 4/24/2020    Procedure: Coronary Angiogram;  Surgeon: Addison Willard MD;  Location:  HEART CARDI[facility] CATH LAB     CV RIGHT HEART CATH MEASUREMENTS RECORDED N/A 4/24/2020    Procedure: Right Heart Cath;  Surgeon: Addison Willard MD;  Location:  HEART [facility]ARD[facility] CATH [facility]     CV TRANSCATHETER AORTIC VALVE REPLACEMENT N/A 7/14/2020    Procedure:  Transcatheter Aortic Valve Replacement;  Surgeon: Soraida Monet MD;  Location:  HEART CARDIAC CATH LAB     EP PACEMAKER N/A 7/15/2020    Procedure: EP Pacemaker;  Surgeon: Tommie Sauer MD;  Location:  HEART CARDIAC CATH LAB     HC ESOPHAGOSCOPY, DIAGNOSTIC      Dr. Dow, lower esophageal ring & mild gastritis     HERNIORRHAPHY INGUINAL Right 2016    Procedure: HERNIORRHAPHY INGUINAL;  Surgeon: Alexis Alexandra MD;  Location:  SD     HERNIORRHAPHY INGUINAL Left 2023    Procedure: LEFT GROIN EXPLORATION, SEGMENTAL SMALL BOWEL RESECTION, TRANSINGUINAL REPAIR OF INCARCERATED/STRANGULATED HERNIA;  Surgeon: Dwayne Russell MD;  Location:  OR     LAPAROSCOPIC CHOLECYSTECTOMY      for biliary sludge     REVERSE ARTHROPLASTY SHOULDER Right 2022    Procedure: RIGHT REVERSE SHOULDER ARTHROPLASTY, WITH OPEN REDUCTION INTERNAL FIXATION, WITH NO CUSTOM GUIDE;  Surgeon: Wiley Vargas MD;  Location:  OR     ZZC NONSPECIFIC PROCEDURE      Coronary artery bypass     ZZHC COLONOSCOPY THRU STOMA W BIOPSY/CAUTERY TUMOR/POLYP/LESION      Dr. Dow, Q 5 years     ZZHC COLONOSCOPY THRU STOMA W BIOPSY/CAUTERY TUMOR/POLYP/LESION      Dr. Dow, one adenomatous polyp       FAMILY HISTORY:  Family History   Problem Relation Age of Onset     C.A.D. Father      Cancer Mother         breast cancer,  of pneumonia     Cerebrovascular Disease Son      CABG Son      Family History Negative Child      Family History Negative Sister      Heart Disease Brother        SOCIAL HISTORY:  Social History     Socioeconomic History     Marital status:      Spouse name: Kailey     Number of children: 3     Years of education: None     Highest education level: None   Occupational History     Occupation: [employer]     Employer: RETIRED   Tobacco Use     Smoking status: Never     Smokeless tobacco: Never     Tobacco comments:     smoked for a week in 20's   Vaping  Use     Vaping Use: Never used   Substance and Sexual Activity     Alcohol use: Not Currently     Alcohol/week: 0.0 standard drinks     Drug use: No     Sexual activity: Yes     Partners: Female   Other Topics Concern      Service No     Blood Transfusions No     Exercise Yes     Seat Belt Yes     Self-Exams No     Parent/sibling w/ CABG, MI or angioplasty before 65F 55M? Yes   Social History Narrative    Balanced Diet - Yes    Osteoporosis Preventative measures-  Dairy servings per day: 2 to 3 servings daily    Regular Exercise -  Yes Describe walks 1.5 mile daily     Dental Exam up - YES - Date: 2 years ago    Eye Exam - YES - Date: 1 year ago    Self Testicular Exam -  No, handout given    Do you have any concerns about STD's -  No    Abuse: Current or Past (Physical, Sexual or Emotional)- No    Do you feel safe in your environment - Yes    Guns stored in the home - Yes, locked    Sunscreen used - No    Seatbelts used - Yes    Lipids - YES - Date: 3-09    Glucose -  YES - Date: 3-09    Colon Cancer Screening - Colonoscopy 3-08(date completed)    Hemoccults - UNKNOWN    PSA - YES - Date: 11-07    Digital Rectal Exam - UNKNOWN    Immunizations reviewed and up to date - Yes, td 1-2005, had shingles vaccine    5-28-09  JANEY Patel [facility]                     Social Determinants of Health     Financial Resource Strain: Low Risk      Difficulty of Paying Living Expenses: Not hard at all   Food Insecurity: No Food Insecurity     Worried About Running Out of Food in the Last Year: Never true     Ran Out of Food in the Last Year: Never true   Transportation Needs: No Transportation Needs     Lack of Transportation (Medical): No     Lack of Transportation (Non-Medical): No       Review of Systems:  Skin:  Negative     Eyes:  Positive for glasses  ENT:  Negative    Respiratory:  Negative    Cardiovascular:  chest pain;Negative;palpitations;dizziness;lightheadedness;fatigue edema;Positive for  Gastroenterology: Negative   "  Genitourinary:  Negative    Musculoskeletal:  Positive for back pain  Neurologic:  Negative migraine headaches  Psychiatric:  Negative    Heme/Lymph/Imm:  Negative    Endocrine:  Negative      Physical Exam:  Vitals: /78 (BP Location: Left arm, Patient Position: Sitting)   Pulse 64   Ht 1.68 m (5' 6.14\")   Wt 72.1 kg (158 lb 14.4 oz)   SpO2 96%   BMI 25.54 kg/m      Constitutional:  in no acute distress chronically ill      Skin:  warm and dry to the touch venous stasis changes pacemaker incision in the left infraclavicular area was well-healed      Head:  normocephalic, no masses or lesions        Eyes:  pupils equal and round, conjunctivae and lids unremarkable, sclera white, no xanthalasma, EOMS intact, no nystagmus        Lymph:No Cervical lymphadenopathy present     ENT:  no pallor or cyanosis, dentition good        Neck:  JVP normal        Respiratory:  normal breath sounds, clear to auscultation, normal A-P diameter, normal symmetry, normal respiratory excursion, no use of accessory muscles         Cardiac: apical impulse not displaced;normal S1 and S2 irregular rhythm soft or inaudible A2   systolic murmur grade 1        pulses below the femoral arteries are diminished                                      GI:  abdomen soft, non-tender, BS normoactive, no mass, no HSM, no bruits        Extremities and Muscular Skeletal:  no deformities, clubbing, cyanosis, erythema observed   bilateral LE edema;trace;1+          Neurological:  no gross motor deficits        Psych:  Alert and Oriented x 3        Recent Lab Results:  LIPID RESULTS:  Lab Results   Component Value Date    CHOL 103 09/06/2022    CHOL 119 06/01/2020    HDL 44 09/06/2022    HDL 48 06/01/2020    LDL 48 09/06/2022    LDL 54 06/01/2020    TRIG 54 09/06/2022    TRIG 87 06/01/2020    CHOLHDLRATIO 3.0 07/07/2015       LIVER ENZYME RESULTS:  Lab Results   Component Value Date    AST 24 01/13/2023    AST 25 03/15/2021    ALT 24 01/13/2023    " ALT 38 03/15/2021       CBC RESULTS:  Lab Results   Component Value Date    WBC 3.9 (L) 01/21/2023    WBC 3.2 (L) 06/07/2021    RBC 3.73 (L) 01/21/2023    RBC 4.89 06/07/2021    HGB 8.5 (L) 01/26/2023    HGB 10.3 (L) 06/07/2021    HCT 25.4 (L) 01/21/2023    HCT 33.5 (L) 06/07/2021    MCV 68 (L) 01/21/2023    MCV 69 (L) 06/07/2021    MCH 21.4 (L) 01/21/2023    MCH 21.1 (L) 06/07/2021    MCHC 31.5 01/21/2023    MCHC 30.7 (L) 06/07/2021    RDW 20.4 (H) 01/21/2023    RDW 16.9 (H) 06/07/2021     (L) 01/21/2023     (L) 06/07/2021       BMP RESULTS:  Lab Results   Component Value Date     (L) 01/18/2023     06/07/2021    POTASSIUM 4.2 01/18/2023    POTASSIUM 4.9 01/16/2023    POTASSIUM 4.3 06/07/2021    CHLORIDE 99 01/18/2023    CHLORIDE 108 01/16/2023    CHLORIDE 104 06/07/2021    CO2 26 01/18/2023    CO2 27 01/16/2023    CO2 27 06/07/2021    ANIONGAP 10 01/18/2023    ANIONGAP 5 01/16/2023    ANIONGAP 5 06/07/2021    GLC 96 01/18/2023    GLC 87 01/16/2023    GLC 97 06/07/2021    BUN 42.6 (H) 01/18/2023    BUN 77 (H) 01/16/2023    BUN 54 (H) 06/07/2021    CR 1.37 (H) 01/18/2023    CR 1.72 (H) 06/07/2021    GFRESTIMATED 50 (L) 01/18/2023    GFRESTIMATED 35 (L) 06/07/2021    GFRESTBLACK 41 (L) 06/07/2021    AYALA 8.9 01/18/2023    AYALA 8.9 06/07/2021        A1C RESULTS:  No results found for: A1C    INR RESULTS:  Lab Results   Component Value Date    INR 1.51 (H) 03/15/2021    INR 1.21 (H) 07/13/2020           CC  Jaxon Field MD  [address]  [place] [id number]

## 2023-01-31 NOTE — LETTER
1/31/2023    Dany Rodriguez MD, MD  [address] [place] [id number]    RE: Shiraz Ingram       Dear Colleague,     I had the pleasure of seeing Shiraz Ingram in the [facility].  HPI and Plan:   Very pleasant 88-year-old gentleman with the following cardiac issues:  The biphasically and intermittent think that that happens all the time so she will get cerumen  1. 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   2.  CKD-III; with a creatinine at baseline ~1.4.   3.  Hypertension, controlled.   4.  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  Mean gradient usually around 9 mmHg.    5.  Chronic atrial fibrillation with history of MAYA thrombus, on anticoagulation with Pradaxa.  Controlled heart rate.  6. CAD, s/p CABG. 2002 [LIMA -LAD, VG-OM and the RCA  7. Hyperlipidemia, well-controlled as of 9/2021.  8. JOSÉ ANTONIO diagnosed 2022.    He was just discharged following a brief hospitalization due to small bowel obstruction.  He had a strangulated left femoral hernia repaired.  His repair site appears clean with no hematoma.    Is doing fine at this time.  He is in a wheelchair.  He is hard of hearing but no  was present.  As I know him well we managed to get by.  He denies chest pain shortness of breath and other heart failure symptoms.  He denies bleeding    Weight is steady at around 157 pounds.  His cardiac murmur is unchanged.  He has no evidence of CHF.    His total cholesterol is 103.  Blood pressure is certainly adequate for man his age.    Overall cardiac status is stable.  We will see again in 6 months time for continued follow-up.        No orders of the defined types were placed in this encounter.      No orders of the defined types were placed in this encounter.      Encounter Diagnoses   Name Primary?     Chronic combined systolic and diastolic heart failure (H)      S/P TAVR (transcatheter aortic valve replacement)       Chronic atrial fibrillation (H)        CURRENT MEDICATIONS:  Current Outpatient Medications   Medication Sig Dispense Refill     acetaminophen (TYLENOL) 500 MG tablet Take 1,000 mg by mouth every 8 hours as needed       albuterol (PROAIR HFA/PROVENTIL HFA/VENTOLIN HFA) 108 (90 Base) MCG/ACT inhaler INHALE 1 TO 2 PUFFS BY MOUTH EVERY 4 TO 6 HOURS AS NEEDED 18 g 2     aspirin (ASA) 81 MG EC tablet Take 81 mg by mouth 2 times daily       budesonide (PULMICORT) 0.5 MG/2ML nebulizer solution Take 2 mLs (0.5 mg) by nebulization 2 times daily 120 mL 0     calcium carbonate 600 mg-vitamin D 400 units (CALTRATE) 600-400 MG-UNIT per tablet Take 1 tablet by mouth daily       dabigatran ANTICOAGULANT (PRADAXA) 150 MG capsule Take 1 capsule (150 mg) by mouth 2 times daily 180 capsule 3     hydrALAZINE (APRESOLINE) 25 MG tablet Take 0.5 tablets (12.5 mg) by mouth 3 times daily 135 tablet 3     ipratropium - albuterol 0.5 mg/2.5 mg/3 mL (DUONEB) 0.5-2.5 (3) MG/3ML nebulization Inhale 1 vial (3 mLs) into the lungs 4 times daily 360 mL 11     isosorbide mononitrate (IMDUR) 30 MG 24 hr tablet Take 1 tablet (30 mg) by mouth daily 90 tablet 3     magnesium chloride (MAG64) 535 (64 Mg) MG TBEC CR tablet Take 1 tablet (535 mg) by mouth 2 times daily 180 tablet 3     metoprolol tartrate (LOPRESSOR) 25 MG tablet Take 1 tablet (25 mg) by mouth 2 times daily 180 tablet 1     montelukast (SINGULAIR) 10 MG tablet Take 1 tablet (10 mg) by mouth daily 90 tablet 1     multivitamin, therapeutic (THERA-VIT) TABS tablet Take 1 tablet by mouth 2 times daily       nitroGLYcerin (NITROSTAT) 0.4 MG sublingual tablet For chest pain place 1 tablet under the tongue every 5 minutes for 3 doses. If symptoms persist 5 minutes after 1st dose call 911. 12 tablet 0     pantoprazole (PROTONIX) 40 MG EC tablet TAKE 1 TABLET BEFORE MEALS TWICE A DAY Strength: 40 mg 180 tablet 1     senna (SENOKOT) 8.6 MG tablet Take 1 tablet by mouth 2 times daily as needed for  constipation (take as needed to have 1 bowel movement a day for 1 week) 30 tablet 0     simvastatin (ZOCOR) 40 MG tablet Take 1 tablet (40 mg) by mouth At Bedtime 90 tablet 3     torsemide (DEMADEX) 5 MG tablet Patient taking 10 mg in the morning. Take additional 5mg if weight gain of 2-3 lbs in 1 day. (Patient taking differently: Patient taking 10 mg two times daily. Take additional 5mg if weight gain of 2-3 lbs in 1 day.) 270 tablet 1       ALLERGIES   No Known Allergies    PAST MEDICAL HISTORY:  Past Medical History:   Diagnosis Date     Allergic rhinitis, cause unspecified      Anemia, unspecified      Aortic stenosis S/P TAVR      Benign neoplasm of colon 1999    Ademonatous polyp     CHF 2nd to TAVR -- S/P Pacemaker 12/2020     CKD (chronic kidney disease) stage 3, GFR 30-59 ml/min (H) 05/12/2011     Community acquired pneumonia 9/29/2020     Coronary atherosclerosis of unspecified type of vessel, native or graft     s/p CABG 2002     Esophageal ulcer w UGI bleed 05/24/2020    Had EGD adn caurtery 5/24/20, colonoscopy with diverticulosis then     Hyperlipidemia LDL goal < 100      Hypertension goal BP (blood pressure) < 140/90      Localized osteoarthrosis not specified whether primary or secondary, lower leg     left knee     Mild persistent asthma without complication 1/11/2016     Moderate persistent asthma      Persistent atrial fibrillation (H)      Unspecified hearing loss        PAST SURGICAL HISTORY:  Past Surgical History:   Procedure Laterality Date     CARDIAC SURGERY       COLONOSCOPY N/A 5/26/2020    Procedure: COLONOSCOPY;  Surgeon: Ignacio Fournier MD;  Location:  GI     CV ANGIOGRAM CORONARY GRAFT N/A 4/24/2020    Procedure: Angiogram Coronary Graft;  Surgeon: Addison Willard MD;  Location: [facility] CARDIAC CATH LAB     CV CORONARY ANGIOGRAM N/A 4/24/2020    Procedure: Coronary Angiogram;  Surgeon: Addison Willard MD;  Location:  HEART CARDIAC CATH LAB     CV RIGHT HEART  CATH MEASUREMENTS RECORDED N/A 2020    Procedure: Right Heart Cath;  Surgeon: Addison Willard MD;  Location:  HEART CARDIAC CATH LAB     CV TRANSCATHETER AORTIC VALVE REPLACEMENT N/A 2020    Procedure: Transcatheter Aortic Valve Replacement;  Surgeon: Soraida Monet MD;  Location:  HEART CARDIAC CATH LAB     EP PACEMAKER N/A 7/15/2020    Procedure: EP Pacemaker;  Surgeon: Tommie Sauer MD;  Location:  HEART CARDIAC CATH LAB     HC ESOPHAGOSCOPY, DIAGNOSTIC      Dr. Dow, lower esophageal ring & mild gastritis     HERNIORRHAPHY INGUINAL Right 2016    Procedure: HERNIORRHAPHY INGUINAL;  Surgeon: Alexis Alexandra MD;  Location: [facility]     HERNIORRHAPHY INGUINAL Left 2023    Procedure: LEFT GROIN EXPLORATION, SEGMENTAL SMALL BOWEL RESECTION, TRANSINGUINAL REPAIR OF INCARCERATED/STRANGULATED HERNIA;  Surgeon: Dwayne Russell MD;  Location:  OR     LAPAROSCOPIC CHOLECYSTECTOMY      for biliary sludge     REVERSE ARTHROPLASTY SHOULDER Right 2022    Procedure: RIGHT REVERSE SHOULDER ARTHROPLASTY, WITH OPEN REDUCTION INTERNAL FIXATION, WITH NO CUSTOM GUIDE;  Surgeon: Wiley Vargas MD;  Location:  OR     ZZC NONSPECIFIC PROCEDURE      Coronary artery bypass     ZZ COLONOSCOPY THRU STOMA W BIOPSY/CAUTERY TUMOR/POLYP/LESION      Dr. Dow, Q 5 years     ZZHC COLONOSCOPY THRU STOMA W BIOPSY/CAUTERY TUMOR/POLYP/LESION      Dr. Dow, one adenomatous polyp       FAMILY HISTORY:  Family History   Problem Relation Age of Onset     C.A.D. Father      Cancer Mother         breast cancer,  of pneumonia     Cerebrovascular Disease Son      CABG Son      Family History Negative Child      Family History Negative Sister      Heart Disease Brother        SOCIAL HISTORY:  Social History     Socioeconomic History     Marital status:      Spouse name: Kailey     Number of children: 3     Years of education: None     Highest  education level: None   Occupational History     Occupation: [employer]     Employer: RETIRED   Tobacco Use     Smoking status: Never     Smokeless tobacco: Never     Tobacco comments:     smoked for a week in 20's   Vaping Use     Vaping Use: Never used   Substance and Sexual Activity     Alcohol use: Not Currently     Alcohol/week: 0.0 standard drinks     Drug use: No     Sexual activity: Yes     Partners: Female   Other Topics Concern      Service No     Blood Transfusions No     Exercise Yes     Seat Belt Yes     Self-Exams No     Parent/sibling w/ CABG, MI or angioplasty before 65F 55M? Yes   Social History Narrative    Balanced Diet - Yes    Osteoporosis Preventative measures-  Dairy servings per day: 2 to 3 servings daily    Regular Exercise -  Yes Describe walks 1.5 mile daily     Dental Exam up - YES - Date: 2 years ago    Eye Exam - YES - Date: 1 year ago    Self Testicular Exam -  No, handout given    Do you have any concerns about STD's -  No    Abuse: Current or Past (Physical, Sexual or Emotional)- No    Do you feel safe in your environment - Yes    Guns stored in the home - Yes, locked    Sunscreen used - No    Seatbelts used - Yes    Lipids - YES - Date: 3-09    Glucose -  YES - Date: 3-09    Colon Cancer Screening - Colonoscopy 3-08(date completed)    Hemoccults - UNKNOWN    PSA - YES - Date: 11-07    Digital Rectal Exam - UNKNOWN    Immunizations reviewed and up to date - Yes, td 1-2005, had shingles vaccine    5-28-09  JANEY Patel [facility]                     Social Determinants of Health     Financial Resource Strain: Low Risk      Difficulty of Paying Living Expenses: Not hard at all   Food Insecurity: No Food Insecurity     Worried About Running Out of Food in the Last Year: Never true     Ran Out of Food in the Last Year: Never true   Transportation Needs: No Transportation Needs     Lack of Transportation (Medical): No     Lack of Transportation (Non-Medical): No       Review of  "Systems:  Skin:  Negative     Eyes:  Positive for glasses  ENT:  Negative    Respiratory:  Negative    Cardiovascular:  chest pain;Negative;palpitations;dizziness;lightheadedness;fatigue edema;Positive for  Gastroenterology: Negative    Genitourinary:  Negative    Musculoskeletal:  Positive for back pain  Neurologic:  Negative migraine headaches  Psychiatric:  Negative    Heme/Lymph/Imm:  Negative    Endocrine:  Negative      Physical Exam:  Vitals: /78 (BP Location: Left arm, Patient Position: Sitting)   Pulse 64   Ht 1.68 m (5' 6.14\")   Wt 72.1 kg (158 lb 14.4 oz)   SpO2 96%   BMI 25.54 kg/m      Constitutional:  in no acute distress chronically ill      Skin:  warm and dry to the touch venous stasis changes pacemaker incision in the left infraclavicular area was well-healed      Head:  normocephalic, no masses or lesions        Eyes:  pupils equal and round, conjunctivae and lids unremarkable, sclera white, no xanthalasma, EOMS intact, no nystagmus        Lymph:No Cervical lymphadenopathy present     ENT:  no pallor or cyanosis, dentition good        Neck:  JVP normal        Respiratory:  normal breath sounds, clear to auscultation, normal A-P diameter, normal symmetry, normal respiratory excursion, no use of accessory muscles         Cardiac: apical impulse not displaced;normal S1 and S2 irregular rhythm soft or inaudible A2   systolic murmur grade 1        pulses below the femoral arteries are diminished                                      GI:  abdomen soft, non-tender, BS normoactive, no mass, no HSM, no bruits        Extremities and Muscular Skeletal:  no deformities, clubbing, cyanosis, erythema observed   bilateral LE edema;trace;1+          Neurological:  no gross motor deficits        Psych:  Alert and Oriented x 3        Recent Lab Results:  LIPID RESULTS:  Lab Results   Component Value Date    CHOL 103 09/06/2022    CHOL 119 06/01/2020    HDL 44 09/06/2022    HDL 48 06/01/2020    LDL 48 " 09/06/2022    LDL 54 06/01/2020    TRIG 54 09/06/2022    TRIG 87 06/01/2020    CHOLHDLRATIO 3.0 07/07/2015       LIVER ENZYME RESULTS:  Lab Results   Component Value Date    AST 24 01/13/2023    AST 25 03/15/2021    ALT 24 01/13/2023    ALT 38 03/15/2021       CBC RESULTS:  Lab Results   Component Value Date    WBC 3.9 (L) 01/21/2023    WBC 3.2 (L) 06/07/2021    RBC 3.73 (L) 01/21/2023    RBC 4.89 06/07/2021    HGB 8.5 (L) 01/26/2023    HGB 10.3 (L) 06/07/2021    HCT 25.4 (L) 01/21/2023    HCT 33.5 (L) 06/07/2021    MCV 68 (L) 01/21/2023    MCV 69 (L) 06/07/2021    MCH 21.4 (L) 01/21/2023    MCH 21.1 (L) 06/07/2021    MCHC 31.5 01/21/2023    MCHC 30.7 (L) 06/07/2021    RDW 20.4 (H) 01/21/2023    RDW 16.9 (H) 06/07/2021     (L) 01/21/2023     (L) 06/07/2021       BMP RESULTS:  Lab Results   Component Value Date     (L) 01/18/2023     06/07/2021    POTASSIUM 4.2 01/18/2023    POTASSIUM 4.9 01/16/2023    POTASSIUM 4.3 06/07/2021    CHLORIDE 99 01/18/2023    CHLORIDE 108 01/16/2023    CHLORIDE 104 06/07/2021    CO2 26 01/18/2023    CO2 27 01/16/2023    CO2 27 06/07/2021    ANIONGAP 10 01/18/2023    ANIONGAP 5 01/16/2023    ANIONGAP 5 06/07/2021    GLC 96 01/18/2023    GLC 87 01/16/2023    GLC 97 06/07/2021    BUN 42.6 (H) 01/18/2023    BUN 77 (H) 01/16/2023    BUN 54 (H) 06/07/2021    CR 1.37 (H) 01/18/2023    CR 1.72 (H) 06/07/2021    GFRESTIMATED 50 (L) 01/18/2023    GFRESTIMATED 35 (L) 06/07/2021    GFRESTBLACK 41 (L) 06/07/2021    AYALA 8.9 01/18/2023    AYALA 8.9 06/07/2021        A1C RESULTS:  No results found for: A1C    INR RESULTS:  Lab Results   Component Value Date    INR 1.51 (H) 03/15/2021    INR 1.21 (H) 07/13/2020           CC  Sandra Field MD  [address] [facility] [address]  [place] [address]    Thank you for allowing me to participate in the care of your patient.      Sincerely,     DR SANDRA FIELD MD     [facility]

## 2023-02-14 ENCOUNTER — TRANSFERRED RECORDS (OUTPATIENT)
Dept: HEALTH INFORMATION MANAGEMENT | Facility: CLINIC | Age: 88
End: 2023-02-14

## 2023-02-26 ENCOUNTER — APPOINTMENT (OUTPATIENT)
Dept: GENERAL RADIOLOGY | Facility: CLINIC | Age: 88
End: 2023-02-26
Attending: EMERGENCY MEDICINE
Payer: COMMERCIAL

## 2023-02-26 ENCOUNTER — HOSPITAL ENCOUNTER (EMERGENCY)
Facility: CLINIC | Age: 88
Discharge: HOME | End: 2023-02-26
Attending: EMERGENCY MEDICINE | Admitting: EMERGENCY MEDICINE
Payer: COMMERCIAL

## 2023-02-26 VITALS
OXYGEN SATURATION: 97 % | HEART RATE: 68 BPM | DIASTOLIC BLOOD PRESSURE: 94 MMHG | RESPIRATION RATE: 25 BRPM | TEMPERATURE: 97.8 F | SYSTOLIC BLOOD PRESSURE: 177 MMHG

## 2023-02-26 DIAGNOSIS — I50.23 ACUTE ON CHRONIC SYSTOLIC CONGESTIVE HEART FAILURE (H): ICD-10-CM

## 2023-02-26 LAB
ALBUMIN SERPL BCG-MCNC: 3.9 G/DL (ref 3.5–5.2)
ALP SERPL-CCNC: 95 U/L (ref 40–129)
ALT SERPL W P-5'-P-CCNC: 16 U/L (ref 10–50)
ANION GAP SERPL CALCULATED.3IONS-SCNC: 10 MMOL/L (ref 7–15)
AST SERPL W P-5'-P-CCNC: 34 U/L (ref 10–50)
ATRIAL RATE - MUSE: NORMAL BPM
BASOPHILS # BLD AUTO: 0 10E3/UL (ref 0–0.2)
BASOPHILS NFR BLD AUTO: 1 %
BILIRUB SERPL-MCNC: 0.5 MG/DL
BUN SERPL-MCNC: 44.2 MG/DL (ref 8–23)
CALCIUM SERPL-MCNC: 9.5 MG/DL (ref 8.8–10.2)
CHLORIDE SERPL-SCNC: 105 MMOL/L (ref 98–107)
CREAT SERPL-MCNC: 1.71 MG/DL (ref 0.67–1.17)
DEPRECATED HCO3 PLAS-SCNC: 29 MMOL/L (ref 22–29)
DIASTOLIC BLOOD PRESSURE - MUSE: NORMAL MMHG
EOSINOPHIL # BLD AUTO: 0.1 10E3/UL (ref 0–0.7)
EOSINOPHIL NFR BLD AUTO: 2 %
ERYTHROCYTE [DISTWIDTH] IN BLOOD BY AUTOMATED COUNT: 18.6 % (ref 10–15)
GFR SERPL CREATININE-BSD FRML MDRD: 38 ML/MIN/1.73M2
GLUCOSE SERPL-MCNC: 114 MG/DL (ref 70–99)
HCT VFR BLD AUTO: 34.9 % (ref 40–53)
HGB BLD-MCNC: 10.5 G/DL (ref 13.3–17.7)
HOLD SPECIMEN: NORMAL
IMM GRANULOCYTES # BLD: 0 10E3/UL
IMM GRANULOCYTES NFR BLD: 0 %
INTERPRETATION ECG - MUSE: NORMAL
LYMPHOCYTES # BLD AUTO: 0.5 10E3/UL (ref 0.8–5.3)
LYMPHOCYTES NFR BLD AUTO: 14 %
MCH RBC QN AUTO: 21.5 PG (ref 26.5–33)
MCHC RBC AUTO-ENTMCNC: 30.1 G/DL (ref 31.5–36.5)
MCV RBC AUTO: 71 FL (ref 78–100)
MONOCYTES # BLD AUTO: 0.3 10E3/UL (ref 0–1.3)
MONOCYTES NFR BLD AUTO: 8 %
NEUTROPHILS # BLD AUTO: 2.6 10E3/UL (ref 1.6–8.3)
NEUTROPHILS NFR BLD AUTO: 75 %
NRBC # BLD AUTO: 0 10E3/UL
NRBC BLD AUTO-RTO: 0 /100
NT-PROBNP SERPL-MCNC: 5214 PG/ML (ref 0–1800)
P AXIS - MUSE: NORMAL DEGREES
PLATELET # BLD AUTO: 137 10E3/UL (ref 150–450)
POTASSIUM SERPL-SCNC: 4.8 MMOL/L (ref 3.4–5.3)
PR INTERVAL - MUSE: NORMAL MS
PROT SERPL-MCNC: 7.2 G/DL (ref 6.4–8.3)
QRS DURATION - MUSE: 148 MS
QT - MUSE: 476 MS
QTC - MUSE: 463 MS
R AXIS - MUSE: -60 DEGREES
RBC # BLD AUTO: 4.89 10E6/UL (ref 4.4–5.9)
SODIUM SERPL-SCNC: 144 MMOL/L (ref 136–145)
SYSTOLIC BLOOD PRESSURE - MUSE: NORMAL MMHG
T AXIS - MUSE: 16 DEGREES
TROPONIN T SERPL HS-MCNC: 51 NG/L
VENTRICULAR RATE- MUSE: 57 BPM
WBC # BLD AUTO: 3.5 10E3/UL (ref 4–11)

## 2023-02-26 PROCEDURE — 99285 EMERGENCY DEPT VISIT HI MDM: CPT | Mod: 25

## 2023-02-26 PROCEDURE — 84484 ASSAY OF TROPONIN QUANT: CPT | Performed by: EMERGENCY MEDICINE

## 2023-02-26 PROCEDURE — 85025 COMPLETE CBC W/AUTO DIFF WBC: CPT | Performed by: EMERGENCY MEDICINE

## 2023-02-26 PROCEDURE — 80053 COMPREHEN METABOLIC PANEL: CPT | Performed by: EMERGENCY MEDICINE

## 2023-02-26 PROCEDURE — 83880 ASSAY OF NATRIURETIC PEPTIDE: CPT | Performed by: EMERGENCY MEDICINE

## 2023-02-26 PROCEDURE — 93005 ELECTROCARDIOGRAM TRACING: CPT

## 2023-02-26 PROCEDURE — 71046 X-RAY EXAM CHEST 2 VIEWS: CPT

## 2023-02-26 PROCEDURE — 36415 COLL VENOUS BLD VENIPUNCTURE: CPT | Performed by: EMERGENCY MEDICINE

## 2023-02-26 ASSESSMENT — ENCOUNTER SYMPTOMS
FEVER: 0
COUGH: 0
RHINORRHEA: 0
SORE THROAT: 0
SHORTNESS OF BREATH: 1
ABDOMINAL PAIN: 0

## 2023-02-26 ASSESSMENT — ACTIVITIES OF DAILY LIVING (ADL): ADLS_ACUITY_SCORE: 37

## 2023-02-26 NOTE — ED TRIAGE NOTES
Pt has a hx of asthma, increasing SOB over the last 24 hours, per son doing home nebs not helping. Trouble sleeping due to SOB- worse laying flat hx of CHF, also hx of PNA with similar symptoms.      Triage Assessment       Row Name 02/26/23 1258       Triage Assessment (Adult)    Airway WDL WDL

## 2023-02-26 NOTE — DISCHARGE INSTRUCTIONS
As discussed, you appear to be slightly fluid overloaded in your lungs, likely causing your shortness of breath.  We will have you increase your Bumex to 10 mg twice a day for the next 3 days.  Monitor your weights closely.  It is important you follow-up with your cardiologist this week for a recheck.  We discussed admission to the hospital, but you have elected to be discharged.  I support this, though understand there should be no hesitation in your part to return to the ER immediately if you develop increased shortness of breath, any chest discomfort, lightheadedness, or any other emergent concerns.

## 2023-02-26 NOTE — ED PROVIDER NOTES
History     Chief Complaint:  Shortness of Breath     The history is provided by the patient and a relative.      Shiraz Ingram is a 88 year old male on Pradaxa with a history of CHF, hypertension, hyperlipidemia, CKD, atrial fibrillation, and asthma who presents with shortness of breath. The patient's son reports he has been having recent shortness of breath over the past 24 hours. He states he has been having trouble sleeping due to shortness of breathe and is unable to lay flat. Patient denies cough, fever, sore throat, abdominal pain, chest pain and rhinorrhea. He notes when he lays down his leg swelling decreases. He usually weighs 160 lbs and right now weighs 155 lbs. He adds being slightly short of breath walking to his bathroom or to his kitchen.     Independent Historian:   Son - They report patient has been def since he was 14 years old. He interprets for his father.     Review of External Notes: Yes-I reviewed his admission to the hospital in mid January for an incarcerated hernia with small bowel obstruction.  I also reviewed his cardiology visit on January 31 with Dr. Jaxon Field.    ROS:  Review of Systems   Constitutional: Negative for fever.   HENT: Negative for rhinorrhea and sore throat.    Respiratory: Positive for shortness of breath. Negative for cough.    Cardiovascular: Positive for leg swelling. Negative for chest pain.   Gastrointestinal: Negative for abdominal pain.   All other systems reviewed and are negative.    Allergies:  No Known Allergies     Medications:    Albuterol  ASA 81  Pradaxa  Imdur  Lopressor  Singulair  Nitrostat  Protonix  Zocor  Demadex     Past Medical History:    Anemia  Aortic stenosis  Pacemaker  CKD  Coronary atherosclerosis  Esophageal ulcer  Hyperlipidemia  Hypertension  Osteoarthrosis   Asthma  Atrial fibrillation   Hearing loss     Past Surgical History:    Cardiac surgery  Colonoscopy  Angiogram   Heart cath  Pacemaker  Esophagoscopy  Herniorrhaphy   Laparoscopic  cholecystectomy  Orthopedic surgery   Colonoscopy    Family History:    family history includes C.A.D. in his father; CABG in his son; Cancer in his mother; Cerebrovascular Disease in his son; Family History Negative in his child and sister; Heart Disease in his brother.    Social History:  reports that he has never smoked. He has never used smokeless tobacco. He reports that he does not currently use alcohol. He reports that he does not use drugs.  The patient presents to the ED with family member via private vehicle.   PCP: Dany Rodriguez     Physical Exam     Patient Vitals for the past 24 hrs:   BP Temp Temp src Pulse Resp SpO2   02/26/23 1300 (!) 164/58 97.8  F (36.6  C) Temporal 71 25 93 %      Physical Exam  Eye:  Pupils are equal, round, and reactive.  Extraocular movements intact.    ENT:  No rhinorrhea.  Moist mucus membranes.  Normal tongue and tonsil.    Cardiac:  Regular rate and rhythm.  No murmurs, gallops, or rubs.    Pulmonary:  Clear to auscultation bilaterally.  No wheezes, rales, or rhonchi.    Abdomen:  Positive bowel sounds.  Abdomen is soft and non-distended, without focal tenderness.    Musculoskeletal:  Normal movement of all extremities without evidence for deficit.    Skin:  Warm and dry without rashes.    Neurologic:  Non-focal exam without asymmetric weakness or numbness.     Psychiatric:  Normal affect with appropriate interaction with examiner.      Emergency Department Course     Imaging:  Chest XR,  PA & LAT   Final Result   IMPRESSION: Left chest single lead pacemaker in place. Heart size and postoperative mediastinal contours are stable. TAVR present. Elevated left hemidiaphragm with basilar atelectasis. Small right pleural effusion. Pulmonary vascular congestion without    significant edema. No pneumothorax. Multiple chronic compression fractures demonstrated.         Report per radiology    Laboratory:  Labs Ordered and Resulted from Time of ED Arrival to Time of ED  Departure   COMPREHENSIVE METABOLIC PANEL - Abnormal       Result Value    Sodium 144      Potassium 4.8      Chloride 105      Carbon Dioxide (CO2) 29      Anion Gap 10      Urea Nitrogen 44.2 (*)     Creatinine 1.71 (*)     Calcium 9.5      Glucose 114 (*)     Alkaline Phosphatase 95      AST 34      ALT 16      Protein Total 7.2      Albumin 3.9      Bilirubin Total 0.5      GFR Estimate 38 (*)    TROPONIN T, HIGH SENSITIVITY - Abnormal    Troponin T, High Sensitivity 51 (*)    NT PROBNP INPATIENT - Abnormal    N terminal Pro BNP Inpatient 5,214 (*)    CBC WITH PLATELETS AND DIFFERENTIAL - Abnormal    WBC Count 3.5 (*)     RBC Count 4.89      Hemoglobin 10.5 (*)     Hematocrit 34.9 (*)     MCV 71 (*)     MCH 21.5 (*)     MCHC 30.1 (*)     RDW 18.6 (*)     Platelet Count 137 (*)     % Neutrophils 75      % Lymphocytes 14      % Monocytes 8      % Eosinophils 2      % Basophils 1      % Immature Granulocytes 0      NRBCs per 100 WBC 0      Absolute Neutrophils 2.6      Absolute Lymphocytes 0.5 (*)     Absolute Monocytes 0.3      Absolute Eosinophils 0.1      Absolute Basophils 0.0      Absolute Immature Granulocytes 0.0      Absolute NRBCs 0.0        Emergency Department Course & Assessments:     Independent Interpretation (X-rays, CTs, rhythm strip):  Yes-I reviewed his chest x-ray.    Social Determinants of Health affecting care:   None    Assessments:  1513 I obtained patient's initial history and examined patient as noted above.   1530 I rechecked patient prior to discharge.     Disposition:  The patient was discharged to home.     Impression & Plan      Medical Decision Making:  This delightful 88-year-old man with a known history of systolic and diastolic heart failure presents to us because of concerns of shortness of breath, especially while laying flat.  This has been ongoing for the past couple of weeks.  However, he felt slightly more short of breath last night and urged his son to bring him into the  hospital for evaluation.  He denies any associated chest pain.  He notes that he is able to ambulate fairly well, having no issues with his ADLs.  He denies any change in his medications, noting he has been compliant with his diuretics and his Pradaxa.  He denies a significant change in his diet.    On my exam, he appears surprisingly well, especially for his age and past medical history.  His lungs are clear.  I personally ambulated the patient 100 feet down the clifford each direction.  He was able to do this without assistance and without increased work of breathing.  I checked his oxygenation and heart rate as soon as we get back to the room and he consistently stayed in the mid 90s with an appropriate response of his heart rate into the 80s which came back down to the 60s.  He does have 2+ pitting edema bilaterally, which he notes is more of an issue as the day goes on, better when he has his feet up.    A laboratory investigation was fully pursued which is generally unremarkable.  His anemia is not much different than his baseline.  His creatinine is also close to its baseline.  His BNP is 5000, though this is baseline, in fact improved from prior times where he had to be admitted for CHF.  His white blood cell count is normal.  He has been admitted 6 months ago for pneumonia, though chest x-ray does not show any evidence of infiltrate and he has no new cough, congestion, or fever.  Chest x-ray does show some increased pulmonary vascular congestion without overt edema.    I believe the patient is likely dealing with a very mild CHF exacerbation, considering his chief issue is simply orthopnea.  His troponin returns very minimally elevated.  I again spoke with him and his son about this and he denies having any chest pain.  He has had some mild troponin elevations in the past as well.  I do not believe this is likely germane to his issues today as the likelihood of him having a blockage causing his orthopnea is  very low.  Nonetheless, I did speak at length with the patient and his son about admitting him to the hospital.  I explained that he is 88 years old with some increased work of breathing and difficulty with orthopnea.  I offered him admission to the hospital for a repeat echocardiogram along with diuresis.  However, the patient asked whether admission was absolutely necessary, as he would prefer to be discharged home.  I see no absolute life threat at this time and I do not feel this is an unreasonable course of action.  He is currently taking torsemide, 10 mg daily.  We will have him double this, taking 10 mg twice a day for the next 3 days and then returning back to his daily dose.  I advised that he be seen by his cardiologist in the next week for recheck.  Most importantly, I believe the patient and his son understand that there should be no hesitation return to the ER immediately if he is developing increased work of breathing, increased leg swelling, significant change in weight home scale, chest pain, lightheadedness, or any other emergent concerns.    Diagnosis:    ICD-10-CM    1. Acute on chronic systolic congestive heart failure (H)  I50.23          Discharge Medications:  New Prescriptions    No medications on file     Scribe Disclosure:  I, Korina Whitman, am serving as a scribe at 2:30 PM on 2/26/2023 to document services personally performed by Trierweiler, Chad A, MD based on my observations and the provider's statements to me.     2/26/2023   Trierweiler, Chad A, MD Trierweiler, Chad A, MD  02/27/23 0942

## 2023-02-28 ENCOUNTER — OFFICE VISIT (OUTPATIENT)
Dept: FAMILY MEDICINE | Facility: CLINIC | Age: 88
End: 2023-02-28
Payer: COMMERCIAL

## 2023-02-28 VITALS
TEMPERATURE: 97.4 F | HEIGHT: 65 IN | RESPIRATION RATE: 18 BRPM | SYSTOLIC BLOOD PRESSURE: 140 MMHG | OXYGEN SATURATION: 97 % | BODY MASS INDEX: 25.52 KG/M2 | WEIGHT: 153.2 LBS | HEART RATE: 56 BPM | DIASTOLIC BLOOD PRESSURE: 80 MMHG

## 2023-02-28 DIAGNOSIS — R19.7 ACUTE DIARRHEA: Primary | ICD-10-CM

## 2023-02-28 DIAGNOSIS — Z87.19 HISTORY OF SMALL BOWEL OBSTRUCTION: ICD-10-CM

## 2023-02-28 DIAGNOSIS — I50.42 CHRONIC COMBINED SYSTOLIC AND DIASTOLIC HEART FAILURE (H): ICD-10-CM

## 2023-02-28 PROCEDURE — T1013 SIGN LANG/ORAL INTERPRETER: HCPCS | Mod: U3

## 2023-02-28 PROCEDURE — 99214 OFFICE O/P EST MOD 30 MIN: CPT | Performed by: FAMILY MEDICINE

## 2023-02-28 NOTE — PROGRESS NOTES
Assessment & Plan     Acute diarrhea  He is not having any other constitutional symptoms.  Due to recent hospitalization it would be important to rule out possibilities.  For infection.  He also has had partial bowel resection and it may be related to that.  If symptoms are persistent he will submit a stool specimen.  He understood.  What symptoms to watch were discussed.  - Ova and Parasite Exam Routine; Future  - C. difficile Toxin B PCR with reflex to C. difficile Antigen and Toxins A/B EIA; Future  - Ova and Parasite Exam Routine  - C. difficile Toxin B PCR with reflex to C. difficile Antigen and Toxins A/B EIA    History of small bowel obstruction  Along with incarcerated hernia.  Status post strangulated left femoral hernia repair with short segment small bowel resection.  Consider imaging if symptoms are persistent.  He understood.    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  He thinks it is from diuresis and recent hospitalization.  Continue to monitor.  Recently seen in the emergency room after his recent cardiology visit.  Diuretic dose was adjusted.  Currently using 10 mg twice daily torsemide.  He is seeing cardiologist tomorrow for follow-up.  He has had a decline in his renal function compared to his recent hospitalization.  I recommend repeating BMP but it can wait until his cardiology appointment.  He understood.           MED REC REQUIRED  Post Medication Reconciliation Status:  Discharge medications reconciled and changed, see notes/orders    Return in about 3 months (around 5/28/2023).    Dany Rodriguez MD, MD  [facility]    Javon Weldon is a 88 year old, presenting for the following health issues:  Hospital F/U (Pt. Was seen at [facility] due to shortness of breath on 2/26/2023)      History of Present Illness       Reason for visit:  Shortness of breath follow up form ER    He eats 2-3 servings of fruits  "and vegetables daily.He consumes 0 sweetened beverage(s) daily.He exercises with enough effort to increase his heart rate 9 or less minutes per day.  He exercises with enough effort to increase his heart rate 3 or less days per week.   He is taking medications regularly.     Frequent bowel movements 4-5 times per day. Alternates - some days around 4-5 times but some days much less. Not having any constipation. No pain killer or antibiotics. Not using stool softner.   Not diarrhea but urgency present.   Never had c diff infection.   No fever.   No blood in stool.   No black stool.   No cramping with passing stool.   Feels normal bowel movements but frequency present.     Saw cardiologist and then had short of breath and had to go to ER.   Now feeling back to baseline. Tomorrow cardiologist follow up.   Weight at home - going down somewhat. Baseline weight 160s. 153 at home today. Same here.   Currently on 10mg bid torsemide.     Was in hospital in January for 9 days for small bowel obstruction.          Review of Systems         Objective    BP (!) 140/80 (BP Location: Right arm, Patient Position: Sitting, Cuff Size: Adult Regular)   Pulse 56   Temp 97.4  F (36.3  C) (Temporal)   Resp 18   Ht 1.64 m (5' 4.57\")   Wt 69.5 kg (153 lb 3.2 oz)   SpO2 97%   BMI 25.84 kg/m    Body mass index is 25.84 kg/m .  Physical Exam                       "

## 2023-03-01 ENCOUNTER — OFFICE VISIT (OUTPATIENT)
Dept: CARDIOLOGY | Facility: CLINIC | Age: 88
End: 2023-03-01
Payer: COMMERCIAL

## 2023-03-01 VITALS
HEIGHT: 65 IN | SYSTOLIC BLOOD PRESSURE: 152 MMHG | OXYGEN SATURATION: 94 % | HEART RATE: 60 BPM | WEIGHT: 154.9 LBS | BODY MASS INDEX: 25.81 KG/M2 | DIASTOLIC BLOOD PRESSURE: 70 MMHG

## 2023-03-01 DIAGNOSIS — I50.42 CHRONIC COMBINED SYSTOLIC AND DIASTOLIC HEART FAILURE (H): Primary | ICD-10-CM

## 2023-03-01 DIAGNOSIS — Z95.2 S/P TAVR (TRANSCATHETER AORTIC VALVE REPLACEMENT): ICD-10-CM

## 2023-03-01 PROCEDURE — 99214 OFFICE O/P EST MOD 30 MIN: CPT | Performed by: INTERNAL MEDICINE

## 2023-03-01 PROCEDURE — 87493 C DIFF AMPLIFIED PROBE: CPT | Performed by: FAMILY MEDICINE

## 2023-03-01 PROCEDURE — 87324 CLOSTRIDIUM AG IA: CPT | Mod: 59 | Performed by: FAMILY MEDICINE

## 2023-03-01 NOTE — PROGRESS NOTES
HPI and Plan:   Mr. Ingram is here to see me for follow-up of shortness of breath.  He was just seen in the emergency room few days ago.    His cardiac history has been extensively outlined and is as follows:    1. 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   2.  CKD-III; with a creatinine at baseline ~1.4.   3.  Hypertension, controlled.   4.  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  Mean gradient usually around 9 mmHg.    5.  Chronic atrial fibrillation with history of MAYA thrombus, on anticoagulation with Pradaxa.  Controlled heart rate.  6. CAD, s/p CABG. 2002 [LIMA -LAD, VG-OM and the RCA  7. Hyperlipidemia, well-controlled as of 9/2021.  8. JOSÉ ANTONIO diagnosed 2022.    When I saw him in January he was doing fine.  He had just had surgery for strangulated left femoral hernia.    Before going to the emergency room on February 26 he has been developing progressive shortness of breath.  He had no cough no sputum production and no fevers.  He denies weight gain.  He has been compliant with his medications and he always adheres to a low-salt diet.    I reviewed some of the data obtained when he was in the emergency room.  Chest x-ray showed small right pleural effusion which I believe is chronic and there is no other evidence of significant pulmonary edema.  His NT proBNP is 5214 which was a little lower compared with previous numbers.  He was not admitted but was given Bumex dose to take.  This is on top of his regular dose of Demadex.  Since then his breathing has improved.    Physical exam today his JVP is not elevated his chest is clear heart sounds are unchanged from previously and that there is a soft systolic murmur.    I think at this stage probably the likeliest cause of his shortness of breath may be mild CHF exacerbation.  I see this because he had responded to high dose of diuretics.  Currently there is no signs of fluid overload.  His NT proBNP is not significantly  higher compared with previously and the chest x-ray showed no clear evidence of pulmonary edema in a frail elderly gentleman like him quite often there are no clear precipitating factors for CHF.  Perhaps another contributing factor may be anemia after his surgery for femoral hernia.    This stage she appears to have largely recovered.  I asked him to go back to his usual dose of Demadex.  He will call us should further issues with shortness of breath develop.  Otherwise he has follow-up scheduled with me from his previous clinic visit.        No orders of the defined types were placed in this encounter.      No orders of the defined types were placed in this encounter.      No diagnosis found.    CURRENT MEDICATIONS:  Current Outpatient Medications   Medication Sig Dispense Refill     acetaminophen (TYLENOL) 500 MG tablet Take 1,000 mg by mouth every 8 hours as needed       albuterol (PROAIR HFA/PROVENTIL HFA/VENTOLIN HFA) 108 (90 Base) MCG/ACT inhaler INHALE 1 TO 2 PUFFS BY MOUTH EVERY 4 TO 6 HOURS AS NEEDED 18 g 2     aspirin (ASA) 81 MG EC tablet Take 81 mg by mouth 2 times daily       budesonide (PULMICORT) 0.5 MG/2ML nebulizer solution Take 2 mLs (0.5 mg) by nebulization 2 times daily 120 mL 0     calcium carbonate 600 mg-vitamin D 400 units (CALTRATE) 600-400 MG-UNIT per tablet Take 1 tablet by mouth daily       dabigatran ANTICOAGULANT (PRADAXA) 150 MG capsule Take 1 capsule (150 mg) by mouth 2 times daily 180 capsule 3     hydrALAZINE (APRESOLINE) 25 MG tablet Take 0.5 tablets (12.5 mg) by mouth 3 times daily 135 tablet 3     ipratropium - albuterol 0.5 mg/2.5 mg/3 mL (DUONEB) 0.5-2.5 (3) MG/3ML nebulization Inhale 1 vial (3 mLs) into the lungs 4 times daily 360 mL 11     isosorbide mononitrate (IMDUR) 30 MG 24 hr tablet Take 1 tablet (30 mg) by mouth daily 90 tablet 3     magnesium chloride (MAG64) 535 (64 Mg) MG TBEC CR tablet Take 1 tablet (535 mg) by mouth 2 times daily 180 tablet 3     metoprolol  tartrate (LOPRESSOR) 25 MG tablet Take 1 tablet (25 mg) by mouth 2 times daily 180 tablet 1     montelukast (SINGULAIR) 10 MG tablet Take 1 tablet (10 mg) by mouth daily 90 tablet 1     multivitamin, therapeutic (THERA-VIT) TABS tablet Take 1 tablet by mouth 2 times daily       nitroGLYcerin (NITROSTAT) 0.4 MG sublingual tablet For chest pain place 1 tablet under the tongue every 5 minutes for 3 doses. If symptoms persist 5 minutes after 1st dose call 911. 12 tablet 0     pantoprazole (PROTONIX) 40 MG EC tablet TAKE 1 TABLET BEFORE MEALS TWICE A DAY Strength: 40 mg 180 tablet 1     simvastatin (ZOCOR) 40 MG tablet Take 1 tablet (40 mg) by mouth At Bedtime 90 tablet 3     torsemide (DEMADEX) 5 MG tablet Patient taking 10 mg in the morning. Take additional 5mg if weight gain of 2-3 lbs in 1 day. (Patient taking differently: Patient taking 10 mg two times daily. Take additional 5mg if weight gain of 2-3 lbs in 1 day.) 270 tablet 1       ALLERGIES   No Known Allergies    PAST MEDICAL HISTORY:  Past Medical History:   Diagnosis Date     Allergic rhinitis, cause unspecified      Anemia, unspecified      Aortic stenosis S/P TAVR      Benign neoplasm of colon 1999    Ademonatous polyp     CHF 2nd to TAVR -- S/P Pacemaker 12/2020     CKD (chronic kidney disease) stage 3, GFR 30-59 ml/min (H) 05/12/2011     Community acquired pneumonia 9/29/2020     Coronary atherosclerosis of unspecified type of vessel, native or graft     s/p CABG 2002     Esophageal ulcer w UGI bleed 05/24/2020    Had EGD adn caurtery 5/24/20, colonoscopy with diverticulosis then     Hyperlipidemia LDL goal < 100      Hypertension goal BP (blood pressure) < 140/90      Localized osteoarthrosis not specified whether primary or secondary, lower leg     left knee     Mild persistent asthma without complication 1/11/2016     Moderate persistent asthma      Persistent atrial fibrillation (H)      Unspecified hearing loss        PAST SURGICAL HISTORY:  Past  Surgical History:   Procedure Laterality Date     CARDIAC SURGERY       COLONOSCOPY N/A 5/26/2020    Procedure: COLONOSCOPY;  Surgeon: Ignacio Fournier MD;  Location:  GI     CV ANGIOGRAM CORONARY GRAFT N/A 4/24/2020    Procedure: Angiogram Coronary Graft;  Surgeon: Addison Willard MD;  Location:  HEART CARDIAC CATH LAB     CV CORONARY ANGIOGRAM N/A 4/24/2020    Procedure: Coronary Angiogram;  Surgeon: Addison Willard MD;  Location:  HEART CARDIAC CATH LAB     CV RIGHT HEART CATH MEASUREMENTS RECORDED N/A 4/24/2020    Procedure: Right Heart Cath;  Surgeon: Addison Willard MD;  Location:  HEART CARDIAC CATH LAB     CV TRANSCATHETER AORTIC VALVE REPLACEMENT N/A 7/14/2020    Procedure: Transcatheter Aortic Valve Replacement;  Surgeon: Soraida Monet MD;  Location:  HEART CARDIAC CATH LAB     EP PACEMAKER N/A 7/15/2020    Procedure: EP Pacemaker;  Surgeon: Tommie Sauer MD;  Location:  HEART CARDIAC CATH LAB     HC ESOPHAGOSCOPY, DIAGNOSTIC  5/03    Dr. Dow, lower esophageal ring & mild gastritis     HERNIORRHAPHY INGUINAL Right 9/26/2016    Procedure: HERNIORRHAPHY INGUINAL;  Surgeon: Alexis Alexandra MD;  Location: [facility]     HERNIORRHAPHY INGUINAL Left 1/13/2023    Procedure: LEFT GROIN EXPLORATION, SEGMENTAL SMALL BOWEL RESECTION, TRANSINGUINAL REPAIR OF INCARCERATED/STRANGULATED HERNIA;  Surgeon: Dwayne Russell MD;  Location:  OR     LAPAROSCOPIC CHOLECYSTECTOMY  12/03    for biliary sludge     REVERSE ARTHROPLASTY SHOULDER Right 2/25/2022    Procedure: RIGHT REVERSE SHOULDER ARTHROPLASTY, WITH OPEN REDUCTION INTERNAL FIXATION, WITH NO CUSTOM GUIDE;  Surgeon: Wiley Vargas MD;  Location:  OR     ZZC NONSPECIFIC PROCEDURE  5/02    Coronary artery bypass     ZZHC COLONOSCOPY THRU STOMA W BIOPSY/CAUTERY TUMOR/POLYP/LESION  5/03    Dr. Dow, Q 5 years     ZZHC COLONOSCOPY THRU STOMA W BIOPSY/CAUTERY TUMOR/POLYP/LESION  12/199      Victor M, one adenomatous polyp       FAMILY HISTORY:  Family History   Problem Relation Age of Onset     C.A.D. Father      Cancer Mother         breast cancer,  of pneumonia     Cerebrovascular Disease Son      CABG Son      Family History Negative Child      Family History Negative Sister      Heart Disease Brother        SOCIAL HISTORY:  Social History     Socioeconomic History     Marital status:      Spouse name: Kailey     Number of children: 3   Occupational History     Occupation: [employer]     Employer: [employer]   Tobacco Use     Smoking status: Never     Smokeless tobacco: Never     Tobacco comments:     smoked for a week in 20's   Vaping Use     Vaping Use: Never used   Substance and Sexual Activity     Alcohol use: Not Currently     Alcohol/week: 0.0 standard drinks     Drug use: No     Sexual activity: Yes     Partners: Female   Other Topics Concern      Service No     Blood Transfusions No     Exercise Yes     Seat Belt Yes     Self-Exams No     Parent/sibling w/ CABG, MI or angioplasty before 65F 55M? Yes   Social History Narrative    Balanced Diet - Yes    Osteoporosis Preventative measures-  Dairy servings per day: 2 to 3 servings daily    Regular Exercise -  Yes Describe walks 1.5 mile daily     Dental Exam up - YES - Date: 2 years ago    Eye Exam - YES - Date: 1 year ago    Self Testicular Exam -  No, handout given    Do you have any concerns about STD's -  No    Abuse: Current or Past (Physical, Sexual or Emotional)- No    Do you feel safe in your environment - Yes    Guns stored in the home - Yes, locked    Sunscreen used - No    Seatbelts used - Yes    Lipids - YES - Date: 3-09    Glucose -  YES - Date: 3-09    Colon Cancer Screening - Colonoscopy 3-08(date completed)    Hemoccults - UNKNOWN    PSA - YES - Date:     Digital Rectal Exam - UNKNOWN    Immunizations reviewed and up to date - Yes, td , had shingles vaccine    09  JANEY Patel CMA                  "    Social Determinants of Health     Financial Resource Strain: Low Risk      Difficulty of Paying Living Expenses: Not hard at all   Food Insecurity: No Food Insecurity     Worried About Running Out of Food in the Last Year: Never true     Ran Out of Food in the Last Year: Never true   Transportation Needs: No Transportation Needs     Lack of Transportation (Medical): No     Lack of Transportation (Non-Medical): No       Review of Systems:  Skin:        Eyes:       ENT:       Respiratory:       Cardiovascular:       Gastroenterology: Negative    Genitourinary:       Musculoskeletal:       Neurologic:  Negative    Psychiatric:       Heme/Lymph/Imm:       Endocrine:         Physical Exam:  Vitals: BP (!) 168/70 (BP Location: Right arm, Patient Position: Sitting, Cuff Size: Adult Regular)   Pulse 60   Ht 1.651 m (5' 5\")   Wt 70.3 kg (154 lb 14.4 oz)   SpO2 94%   BMI 25.78 kg/m      Constitutional:  in no acute distress chronically ill      Skin:  warm and dry to the touch venous stasis changes pacemaker incision in the left infraclavicular area was well-healed      Head:  normocephalic, no masses or lesions        Eyes:  pupils equal and round, conjunctivae and lids unremarkable, sclera white, no xanthalasma, EOMS intact, no nystagmus        Lymph:No Cervical lymphadenopathy present     ENT:  no pallor or cyanosis, dentition good        Neck:  JVP normal        Respiratory:  normal breath sounds, clear to auscultation, normal A-P diameter, normal symmetry, normal respiratory excursion, no use of accessory muscles         Cardiac: apical impulse not displaced;normal S1 and S2 irregular rhythm soft or inaudible A2   systolic murmur grade 1        pulses below the femoral arteries are diminished                                      GI:  abdomen soft, non-tender, BS normoactive, no mass, no HSM, no bruits        Extremities and Muscular Skeletal:  no deformities, clubbing, cyanosis, erythema observed   bilateral LE " edema;1+          Neurological:  no gross motor deficits        Psych:  Alert and Oriented x 3        Recent Lab Results:  LIPID RESULTS:  Lab Results   Component Value Date    CHOL 103 09/06/2022    CHOL 119 06/01/2020    HDL 44 09/06/2022    HDL 48 06/01/2020    LDL 48 09/06/2022    LDL 54 06/01/2020    TRIG 54 09/06/2022    TRIG 87 06/01/2020    CHOLHDLRATIO 3.0 07/07/2015       LIVER ENZYME RESULTS:  Lab Results   Component Value Date    AST 34 02/26/2023    AST 25 03/15/2021    ALT 16 02/26/2023    ALT 38 03/15/2021       CBC RESULTS:  Lab Results   Component Value Date    WBC 3.5 (L) 02/26/2023    WBC 3.2 (L) 06/07/2021    RBC 4.89 02/26/2023    RBC 4.89 06/07/2021    HGB 10.5 (L) 02/26/2023    HGB 10.3 (L) 06/07/2021    HCT 34.9 (L) 02/26/2023    HCT 33.5 (L) 06/07/2021    MCV 71 (L) 02/26/2023    MCV 69 (L) 06/07/2021    MCH 21.5 (L) 02/26/2023    MCH 21.1 (L) 06/07/2021    MCHC 30.1 (L) 02/26/2023    MCHC 30.7 (L) 06/07/2021    RDW 18.6 (H) 02/26/2023    RDW 16.9 (H) 06/07/2021     (L) 02/26/2023     (L) 06/07/2021       BMP RESULTS:  Lab Results   Component Value Date     02/26/2023     06/07/2021    POTASSIUM 4.8 02/26/2023    POTASSIUM 4.9 01/16/2023    POTASSIUM 4.3 06/07/2021    CHLORIDE 105 02/26/2023    CHLORIDE 108 01/16/2023    CHLORIDE 104 06/07/2021    CO2 29 02/26/2023    CO2 27 01/16/2023    CO2 27 06/07/2021    ANIONGAP 10 02/26/2023    ANIONGAP 5 01/16/2023    ANIONGAP 5 06/07/2021     (H) 02/26/2023    GLC 87 01/16/2023    GLC 97 06/07/2021    BUN 44.2 (H) 02/26/2023    BUN 77 (H) 01/16/2023    BUN 54 (H) 06/07/2021    CR 1.71 (H) 02/26/2023    CR 1.72 (H) 06/07/2021    GFRESTIMATED 38 (L) 02/26/2023    GFRESTIMATED 35 (L) 06/07/2021    GFRESTBLACK 41 (L) 06/07/2021    AYALA 9.5 02/26/2023    AYALA 8.9 06/07/2021        A1C RESULTS:  No results found for: A1C    INR RESULTS:  Lab Results   Component Value Date    INR 1.51 (H) 03/15/2021    INR 1.21 (H) 07/13/2020            CC  No referring provider defined for this encounter.

## 2023-03-01 NOTE — LETTER
3/1/2023    Dany Rodriguez MD, MD  [address] [place] [id number]    RE: Shiraz Ingram       Dear Colleague,     I had the pleasure of seeing Shiraz Ingram in the [facility].  HPI and Plan:   Mr. Ingram is here to see me for follow-up of shortness of breath.  He was just seen in the emergency room few days ago.    His cardiac history has been extensively outlined and is as follows:    1. 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   2.  CKD-III; with a creatinine at baseline ~1.4.   3.  Hypertension, controlled.   4.  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  Mean gradient usually around 9 mmHg.    5.  Chronic atrial fibrillation with history of MAYA thrombus, on anticoagulation with Pradaxa.  Controlled heart rate.  6. CAD, s/p CABG. 2002 [LIMA -LAD, VG-OM and the RCA  7. Hyperlipidemia, well-controlled as of 9/2021.  8. JOSÉ ANTONIO diagnosed 2022.    When I saw him in January he was doing fine.  He had just had surgery for strangulated left femoral hernia.    Before going to the emergency room on February 26 he has been developing progressive shortness of breath.  He had no cough no sputum production and no fevers.  He denies weight gain.  He has been compliant with his medications and he always adheres to a low-salt diet.    I reviewed some of the data obtained when he was in the emergency room.  Chest x-ray showed small right pleural effusion which I believe is chronic and there is no other evidence of significant pulmonary edema.  His NT proBNP is 5214 which was a little lower compared with previous numbers.  He was not admitted but was given Bumex dose to take.  This is on top of his regular dose of Demadex.  Since then his breathing has improved.    Physical exam today his JVP is not elevated his chest is clear heart sounds are unchanged from previously and that there is a soft systolic murmur.    I think at this stage probably the likeliest cause  of his shortness of breath may be mild CHF exacerbation.  I see this because he had responded to high dose of diuretics.  Currently there is no signs of fluid overload.  His NT proBNP is not significantly higher compared with previously and the chest x-ray showed no clear evidence of pulmonary edema in a frail elderly gentleman like him quite often there are no clear precipitating factors for CHF.  Perhaps another contributing factor may be anemia after his surgery for femoral hernia.    This stage she appears to have largely recovered.  I asked him to go back to his usual dose of Demadex.  He will call us should further issues with shortness of breath develop.  Otherwise he has follow-up scheduled with me from his previous clinic visit.        No orders of the defined types were placed in this encounter.      No orders of the defined types were placed in this encounter.      No diagnosis found.    CURRENT MEDICATIONS:  Current Outpatient Medications   Medication Sig Dispense Refill     acetaminophen (TYLENOL) 500 MG tablet Take 1,000 mg by mouth every 8 hours as needed       albuterol (PROAIR HFA/PROVENTIL HFA/VENTOLIN HFA) 108 (90 Base) MCG/ACT inhaler INHALE 1 TO 2 PUFFS BY MOUTH EVERY 4 TO 6 HOURS AS NEEDED 18 g 2     aspirin (ASA) 81 MG EC tablet Take 81 mg by mouth 2 times daily       budesonide (PULMICORT) 0.5 MG/2ML nebulizer solution Take 2 mLs (0.5 mg) by nebulization 2 times daily 120 mL 0     calcium carbonate 600 mg-vitamin D 400 units (CALTRATE) 600-400 MG-UNIT per tablet Take 1 tablet by mouth daily       dabigatran ANTICOAGULANT (PRADAXA) 150 MG capsule Take 1 capsule (150 mg) by mouth 2 times daily 180 capsule 3     hydrALAZINE (APRESOLINE) 25 MG tablet Take 0.5 tablets (12.5 mg) by mouth 3 times daily 135 tablet 3     ipratropium - albuterol 0.5 mg/2.5 mg/3 mL (DUONEB) 0.5-2.5 (3) MG/3ML nebulization Inhale 1 vial (3 mLs) into the lungs 4 times daily 360 mL 11     isosorbide mononitrate (IMDUR) 30  MG 24 hr tablet Take 1 tablet (30 mg) by mouth daily 90 tablet 3     magnesium chloride (MAG64) 535 (64 Mg) MG TBEC CR tablet Take 1 tablet (535 mg) by mouth 2 times daily 180 tablet 3     metoprolol tartrate (LOPRESSOR) 25 MG tablet Take 1 tablet (25 mg) by mouth 2 times daily 180 tablet 1     montelukast (SINGULAIR) 10 MG tablet Take 1 tablet (10 mg) by mouth daily 90 tablet 1     multivitamin, therapeutic (THERA-VIT) TABS tablet Take 1 tablet by mouth 2 times daily       nitroGLYcerin (NITROSTAT) 0.4 MG sublingual tablet For chest pain place 1 tablet under the tongue every 5 minutes for 3 doses. If symptoms persist 5 minutes after 1st dose call 911. 12 tablet 0     pantoprazole (PROTONIX) 40 MG EC tablet TAKE 1 TABLET BEFORE MEALS TWICE A DAY Strength: 40 mg 180 tablet 1     simvastatin (ZOCOR) 40 MG tablet Take 1 tablet (40 mg) by mouth At Bedtime 90 tablet 3     torsemide (DEMADEX) 5 MG tablet Patient taking 10 mg in the morning. Take additional 5mg if weight gain of 2-3 lbs in 1 day. (Patient taking differently: Patient taking 10 mg two times daily. Take additional 5mg if weight gain of 2-3 lbs in 1 day.) 270 tablet 1       ALLERGIES   No Known Allergies    PAST MEDICAL HISTORY:  Past Medical History:   Diagnosis Date     Allergic rhinitis, cause unspecified      Anemia, unspecified      Aortic stenosis S/P TAVR      Benign neoplasm of colon 1999    Ademonatous polyp     CHF 2nd to TAVR -- S/P Pacemaker 12/2020     CKD (chronic kidney disease) stage 3, GFR 30-59 ml/min (H) 05/12/2011     Community acquired pneumonia 9/29/2020     Coronary atherosclerosis of unspecified type of vessel, native or graft     s/p CABG 2002     Esophageal ulcer w UGI bleed 05/24/2020    Had EGD adn caurtery 5/24/20, colonoscopy with diverticulosis then     Hyperlipidemia LDL goal < 100      Hypertension goal BP (blood pressure) < 140/90      Localized osteoarthrosis not specified whether primary or secondary, lower leg     left knee      Mild persistent asthma without complication 1/11/2016     Moderate persistent asthma      Persistent atrial fibrillation (H)      Unspecified hearing loss        PAST SURGICAL HISTORY:  Past Surgical History:   Procedure Laterality Date     CARDIAC SURGERY       COLONOSCOPY N/A 5/26/2020    Procedure: COLONOSCOPY;  Surgeon: Ignacio Fournier MD;  Location:  GI     CV ANGIOGRAM CORONARY GRAFT N/A 4/24/2020    Procedure: Angiogram Coronary Graft;  Surgeon: Addison Willard MD;  Location:  HEART CARDIAC CATH LAB     CV CORONARY ANGIOGRAM N/A 4/24/2020    Procedure: Coronary Angiogram;  Surgeon: Addison Willard MD;  Location:  HEART CARDIAC CATH LAB     CV RIGHT HEART CATH MEASUREMENTS RECORDED N/A 4/24/2020    Procedure: Right Heart Cath;  Surgeon: Addison Willard MD;  Location:  HEART CARDIAC CATH LAB     CV TRANSCATHETER AORTIC VALVE REPLACEMENT N/A 7/14/2020    Procedure: Transcatheter Aortic Valve Replacement;  Surgeon: Soraida Monet MD;  Location:  HEART CARDIAC CATH LAB     EP PACEMAKER N/A 7/15/2020    Procedure: EP Pacemaker;  Surgeon: Tommie Sauer MD;  Location:  HEART CARDIAC CATH LAB     HC ESOPHAGOSCOPY, DIAGNOSTIC  5/03    Dr. Dow, lower esophageal ring & mild gastritis     HERNIORRHAPHY INGUINAL Right 9/26/2016    Procedure: HERNIORRHAPHY INGUINAL;  Surgeon: Alexis Alexandra MD;  Location: [facility]     HERNIORRHAPHY INGUINAL Left 1/13/2023    Procedure: LEFT GROIN EXPLORATION, SEGMENTAL SMALL BOWEL RESECTION, TRANSINGUINAL REPAIR OF INCARCERATED/STRANGULATED HERNIA;  Surgeon: Dwayne Russell MD;  Location:  OR     LAPAROSCOPIC CHOLECYSTECTOMY  12/03    for biliary sludge     REVERSE ARTHROPLASTY SHOULDER Right 2/25/2022    Procedure: RIGHT REVERSE SHOULDER ARTHROPLASTY, WITH OPEN REDUCTION INTERNAL FIXATION, WITH NO CUSTOM GUIDE;  Surgeon: Wiley Vargas MD;  Location:  OR     ZZC NONSPECIFIC PROCEDURE  5/02    Coronary  artery bypass     ZZ COLONOSCOPY THRU STOMA W BIOPSY/CAUTERY TUMOR/POLYP/LESION      Dr. Dow, Q 5 years     ZZHC COLONOSCOPY THRU STOMA W BIOPSY/CAUTERY TUMOR/POLYP/LESION      Dr. Dow, one adenomatous polyp       FAMILY HISTORY:  Family History   Problem Relation Age of Onset     C.A.D. Father      Cancer Mother         breast cancer,  of pneumonia     Cerebrovascular Disease Son      CABG Son      Family History Negative Child      Family History Negative Sister      Heart Disease Brother        SOCIAL HISTORY:  Social History     Socioeconomic History     Marital status:      Spouse name: Kailey     Number of children: 3   Occupational History     Occupation: [employer]     Employer: [employer]   Tobacco Use     Smoking status: Never     Smokeless tobacco: Never     Tobacco comments:     smoked for a week in 20's   Vaping Use     Vaping Use: Never used   Substance and Sexual Activity     Alcohol use: Not Currently     Alcohol/week: 0.0 standard drinks     Drug use: No     Sexual activity: Yes     Partners: Female   Other Topics Concern      Service No     Blood Transfusions No     Exercise Yes     Seat Belt Yes     Self-Exams No     Parent/sibling w/ CABG, MI or angioplasty before 65F 55M? Yes   Social History Narrative    Balanced Diet - Yes    Osteoporosis Preventative measures-  Dairy servings per day: 2 to 3 servings daily    Regular Exercise -  Yes Describe walks 1.5 mile daily     Dental Exam up - YES - Date: 2 years ago    Eye Exam - YES - Date: 1 year ago    Self Testicular Exam -  No, handout given    Do you have any concerns about STD's -  No    Abuse: Current or Past (Physical, Sexual or Emotional)- No    Do you feel safe in your environment - Yes    Guns stored in the home - Yes, locked    Sunscreen used - No    Seatbelts used - Yes    Lipids - YES - Date: 3-09    Glucose -  YES - Date: 3-09    Colon Cancer Screening - Colonoscopy 3-08(date completed)    Hemoccults -  "UNKNOWN    PSA - YES - Date: 11-07    Digital Rectal Exam - UNKNOWN    Immunizations reviewed and up to date - Yes, td 1-2005, had shingles vaccine    5-28-09  JANEY Patel CMA                     Social Determinants of Health     Financial Resource Strain: Low Risk      Difficulty of Paying Living Expenses: Not hard at all   Food Insecurity: No Food Insecurity     Worried About Running Out of Food in the Last Year: Never true     Ran Out of Food in the Last Year: Never true   Transportation Needs: No Transportation Needs     Lack of Transportation (Medical): No     Lack of Transportation (Non-Medical): No       Review of Systems:  Skin:        Eyes:       ENT:       Respiratory:       Cardiovascular:       Gastroenterology: Negative    Genitourinary:       Musculoskeletal:       Neurologic:  Negative    Psychiatric:       Heme/Lymph/Imm:       Endocrine:         Physical Exam:  Vitals: BP (!) 168/70 (BP Location: Right arm, Patient Position: Sitting, Cuff Size: Adult Regular)   Pulse 60   Ht 1.651 m (5' 5\")   Wt 70.3 kg (154 lb 14.4 oz)   SpO2 94%   BMI 25.78 kg/m      Constitutional:  in no acute distress chronically ill      Skin:  warm and dry to the touch venous stasis changes pacemaker incision in the left infraclavicular area was well-healed      Head:  normocephalic, no masses or lesions        Eyes:  pupils equal and round, conjunctivae and lids unremarkable, sclera white, no xanthalasma, EOMS intact, no nystagmus        Lymph:No Cervical lymphadenopathy present     ENT:  no pallor or cyanosis, dentition good        Neck:  JVP normal        Respiratory:  normal breath sounds, clear to auscultation, normal A-P diameter, normal symmetry, normal respiratory excursion, no use of accessory muscles         Cardiac: apical impulse not displaced;normal S1 and S2 irregular rhythm soft or inaudible A2   systolic murmur grade 1        pulses below the femoral arteries are diminished                                  "     GI:  abdomen soft, non-tender, BS normoactive, no mass, no HSM, no bruits        Extremities and Muscular Skeletal:  no deformities, clubbing, cyanosis, erythema observed   bilateral LE edema;1+          Neurological:  no gross motor deficits        Psych:  Alert and Oriented x 3        Recent Lab Results:  LIPID RESULTS:  Lab Results   Component Value Date    CHOL 103 09/06/2022    CHOL 119 06/01/2020    HDL 44 09/06/2022    HDL 48 06/01/2020    LDL 48 09/06/2022    LDL 54 06/01/2020    TRIG 54 09/06/2022    TRIG 87 06/01/2020    CHOLHDLRATIO 3.0 07/07/2015       LIVER ENZYME RESULTS:  Lab Results   Component Value Date    AST 34 02/26/2023    AST 25 03/15/2021    ALT 16 02/26/2023    ALT 38 03/15/2021       CBC RESULTS:  Lab Results   Component Value Date    WBC 3.5 (L) 02/26/2023    WBC 3.2 (L) 06/07/2021    RBC 4.89 02/26/2023    RBC 4.89 06/07/2021    HGB 10.5 (L) 02/26/2023    HGB 10.3 (L) 06/07/2021    HCT 34.9 (L) 02/26/2023    HCT 33.5 (L) 06/07/2021    MCV 71 (L) 02/26/2023    MCV 69 (L) 06/07/2021    MCH 21.5 (L) 02/26/2023    MCH 21.1 (L) 06/07/2021    MCHC 30.1 (L) 02/26/2023    MCHC 30.7 (L) 06/07/2021    RDW 18.6 (H) 02/26/2023    RDW 16.9 (H) 06/07/2021     (L) 02/26/2023     (L) 06/07/2021       BMP RESULTS:  Lab Results   Component Value Date     02/26/2023     06/07/2021    POTASSIUM 4.8 02/26/2023    POTASSIUM 4.9 01/16/2023    POTASSIUM 4.3 06/07/2021    CHLORIDE 105 02/26/2023    CHLORIDE 108 01/16/2023    CHLORIDE 104 06/07/2021    CO2 29 02/26/2023    CO2 27 01/16/2023    CO2 27 06/07/2021    ANIONGAP 10 02/26/2023    ANIONGAP 5 01/16/2023    ANIONGAP 5 06/07/2021     (H) 02/26/2023    GLC 87 01/16/2023    GLC 97 06/07/2021    BUN 44.2 (H) 02/26/2023    BUN 77 (H) 01/16/2023    BUN 54 (H) 06/07/2021    CR 1.71 (H) 02/26/2023    CR 1.72 (H) 06/07/2021    GFRESTIMATED 38 (L) 02/26/2023    GFRESTIMATED 35 (L) 06/07/2021    GFRESTBLACK 41 (L) 06/07/2021    AYALA  9.5 02/26/2023    AYALA 8.9 06/07/2021        A1C RESULTS:  No results found for: A1C    INR RESULTS:  Lab Results   Component Value Date    INR 1.51 (H) 03/15/2021    INR 1.21 (H) 07/13/2020           CC  No referring provider defined for this encounter.    Thank you for allowing me to participate in the care of your patient.      Sincerely,     DR SANDRA ASTUDILLO MD     [facility]

## 2023-03-02 ENCOUNTER — TELEPHONE (OUTPATIENT)
Dept: FAMILY MEDICINE | Facility: CLINIC | Age: 88
End: 2023-03-02
Payer: COMMERCIAL

## 2023-03-02 DIAGNOSIS — A49.8 CLOSTRIDIUM DIFFICILE INFECTION: Primary | ICD-10-CM

## 2023-03-02 LAB
C DIFF GDH STL QL IA: POSITIVE
C DIFF TOX A+B STL QL IA: NEGATIVE
C DIFF TOX B STL QL: POSITIVE

## 2023-03-02 PROCEDURE — 87209 SMEAR COMPLEX STAIN: CPT | Performed by: FAMILY MEDICINE

## 2023-03-02 PROCEDURE — 87177 OVA AND PARASITES SMEARS: CPT | Performed by: FAMILY MEDICINE

## 2023-03-02 RX ORDER — VANCOMYCIN HYDROCHLORIDE 125 MG/1
125 CAPSULE ORAL 4 TIMES DAILY
Qty: 40 CAPSULE | Refills: 0 | Status: SHIPPED | OUTPATIENT
Start: 2023-03-02 | End: 2023-03-12

## 2023-03-02 NOTE — TELEPHONE ENCOUNTER
Positive for c diff.  I have called in vancomycin   He should complete 10 day course.   Patient is deaf and son helps with phone calls - OK to call his son.   If feeling worse with diarrhea - should go to ER due to his multiple risk factors.

## 2023-03-03 LAB — O+P STL MICRO: NEGATIVE

## 2023-03-03 NOTE — TELEPHONE ENCOUNTER
I called Willi, informed of message below    He provided understanding and will let patient know    KATHLEEN BravoN RN  [facility]

## 2023-03-11 ENCOUNTER — TELEPHONE (OUTPATIENT)
Dept: FAMILY MEDICINE | Facility: CLINIC | Age: 88
End: 2023-03-11
Payer: COMMERCIAL

## 2023-03-11 DIAGNOSIS — E78.5 HYPERLIPIDEMIA WITH TARGET LDL LESS THAN 100: ICD-10-CM

## 2023-03-11 DIAGNOSIS — Z95.2 S/P TAVR (TRANSCATHETER AORTIC VALVE REPLACEMENT): ICD-10-CM

## 2023-03-11 DIAGNOSIS — I50.31 ACUTE DIASTOLIC HEART FAILURE (H): ICD-10-CM

## 2023-03-15 NOTE — TELEPHONE ENCOUNTER
"Writer is attempting to send in refill for Mr. Ingram's Torsemide 5mg tablets prescription.  However, there are several different sets of directions and patient reported doses.    Writer has received message from Dr. Field, asking Team to send in a refill.   Last OV note by Dr. Field, dated 3\1\23, states that \"I asked him to go back to his usual dose of Demadex.\"    Patient is prescribed Torsemide at this dose:   10mg daily with 5 mg prn dose if weight goes up 2-3 Lb overnight.    Patient reports actually taking Torsemide at:  10mg twice a day, plus 5mg if weight goes up 2-3 Lb overnight.    Writer has placed call to Willi, Mr. Ingram's son, because patient is deaf and needs  to communicate.  I requested that Willi call us back with what is \"usual\" dose of Torsemide.    Kaylynn Olivares RN, BSN   Team 3  [phone]    "

## 2023-03-15 NOTE — TELEPHONE ENCOUNTER
Routing refill request to provider for review/approval because:  -- Janay given x1   -- Labs out of range:  LDL  -- Blood pressure elevated    Diuretics (Including Combos) Protocol Failed      Blood pressure under 140/90 in past 12 months        BP Readings from Last 3 Encounters:   03/01/23 (!) 152/70   02/28/23 (!) 140/80   02/26/23 (!) 177/94          Normal serum creatinine on file in past 12 months        Recent Labs   Lab Test 02/26/23  1305   CR 1.71*      Last Written Prescription Date:  9/6/2022  Last Fill Quantity: 270,  # refills: 1   Last office visit: 2/28/2023 with prescribing provider:    Dr. Rodriguez   Future Office Visit:   Next 5 appointments (look out 90 days)    May 30, 2023  8:30 AM  (Arrive by 8:10 AM)  Provider Visit with Dany Rodriguez MD  [facility] ([facility] ) [address] [place] [address]  [phone]         KATHLEEN ZengN RN  [facility]

## 2023-03-15 NOTE — TELEPHONE ENCOUNTER
[facility] Call Center    Phone Message    May a detailed message be left on voicemail: yes     Reason for Call: Other: Please call Forrest back. He states his dad(Pt) takes two 5MG tables a day.      Action Taken: Message routed to:  Other: Cardiology    Travel Screening: Not Applicable       Thank you!  [facility]

## 2023-03-16 DIAGNOSIS — I50.31 ACUTE DIASTOLIC HEART FAILURE (H): ICD-10-CM

## 2023-03-16 DIAGNOSIS — Z95.2 S/P TAVR (TRANSCATHETER AORTIC VALVE REPLACEMENT): ICD-10-CM

## 2023-03-16 DIAGNOSIS — E78.5 HYPERLIPIDEMIA WITH TARGET LDL LESS THAN 100: ICD-10-CM

## 2023-03-16 RX ORDER — TORSEMIDE 5 MG/1
TABLET ORAL
Qty: 270 TABLET | Refills: 0 | OUTPATIENT
Start: 2023-03-16

## 2023-03-16 RX ORDER — TORSEMIDE 5 MG/1
TABLET ORAL
Qty: 270 TABLET | Refills: 3 | Status: ON HOLD | OUTPATIENT
Start: 2023-03-16 | End: 2024-02-11

## 2023-03-16 NOTE — TELEPHONE ENCOUNTER
Received call back with clarification for the dose of Torsemide that Shiarz is taking.  He takes 5mg tab, 2 tabs for 10mg every morning.  If his weight has gone up 2-3 Lb overnight, he will take an additional dose of 1 tab or 5mg.  I sent refill to his [facility] pharmacy.  I called son, Willi and LVJUAN DIEGO.   I also called patient using the  line, and told him this as well.    Kaylynn Olivares RN on 3/16/2023 at 9:20 AM

## 2023-04-11 ENCOUNTER — ANCILLARY PROCEDURE (OUTPATIENT)
Dept: CARDIOLOGY | Facility: CLINIC | Age: 88
End: 2023-04-11
Attending: INTERNAL MEDICINE
Payer: COMMERCIAL

## 2023-04-11 DIAGNOSIS — Z95.0 CARDIAC PACEMAKER IN SITU: ICD-10-CM

## 2023-04-11 DIAGNOSIS — I49.5 SSS (SICK SINUS SYNDROME) (H): ICD-10-CM

## 2023-04-11 PROCEDURE — 93294 REM INTERROG EVL PM/LDLS PM: CPT | Performed by: INTERNAL MEDICINE

## 2023-04-11 PROCEDURE — 93296 REM INTERROG EVL PM/IDS: CPT | Performed by: INTERNAL MEDICINE

## 2023-04-12 LAB
MDC_IDC_EPISODE_DTM: NORMAL
MDC_IDC_EPISODE_DURATION: 0 S
MDC_IDC_EPISODE_ID: 5
MDC_IDC_EPISODE_TYPE: NORMAL
MDC_IDC_LEAD_IMPLANT_DT: NORMAL
MDC_IDC_LEAD_LOCATION: NORMAL
MDC_IDC_LEAD_LOCATION_DETAIL_1: NORMAL
MDC_IDC_LEAD_MFG: NORMAL
MDC_IDC_LEAD_MODEL: NORMAL
MDC_IDC_LEAD_POLARITY_TYPE: NORMAL
MDC_IDC_LEAD_SERIAL: NORMAL
MDC_IDC_MSMT_BATTERY_DTM: NORMAL
MDC_IDC_MSMT_BATTERY_REMAINING_LONGEVITY: 152 MO
MDC_IDC_MSMT_BATTERY_RRT_TRIGGER: 2.62
MDC_IDC_MSMT_BATTERY_STATUS: NORMAL
MDC_IDC_MSMT_BATTERY_VOLTAGE: 3.04 V
MDC_IDC_MSMT_LEADCHNL_RV_IMPEDANCE_VALUE: 304 OHM
MDC_IDC_MSMT_LEADCHNL_RV_IMPEDANCE_VALUE: 456 OHM
MDC_IDC_MSMT_LEADCHNL_RV_PACING_THRESHOLD_AMPLITUDE: 0.88 V
MDC_IDC_MSMT_LEADCHNL_RV_PACING_THRESHOLD_PULSEWIDTH: 0.4 MS
MDC_IDC_MSMT_LEADCHNL_RV_SENSING_INTR_AMPL: 8.38 MV
MDC_IDC_MSMT_LEADCHNL_RV_SENSING_INTR_AMPL: 8.38 MV
MDC_IDC_PG_IMPLANT_DTM: NORMAL
MDC_IDC_PG_MFG: NORMAL
MDC_IDC_PG_MODEL: NORMAL
MDC_IDC_PG_SERIAL: NORMAL
MDC_IDC_PG_TYPE: NORMAL
MDC_IDC_SESS_CLINIC_NAME: NORMAL
MDC_IDC_SESS_DTM: NORMAL
MDC_IDC_SESS_TYPE: NORMAL
MDC_IDC_SET_BRADY_HYSTRATE: NORMAL
MDC_IDC_SET_BRADY_LOWRATE: 50 {BEATS}/MIN
MDC_IDC_SET_BRADY_MODE: NORMAL
MDC_IDC_SET_LEADCHNL_RV_PACING_AMPLITUDE: 2 V
MDC_IDC_SET_LEADCHNL_RV_PACING_ANODE_ELECTRODE_1: NORMAL
MDC_IDC_SET_LEADCHNL_RV_PACING_ANODE_LOCATION_1: NORMAL
MDC_IDC_SET_LEADCHNL_RV_PACING_CAPTURE_MODE: NORMAL
MDC_IDC_SET_LEADCHNL_RV_PACING_CATHODE_ELECTRODE_1: NORMAL
MDC_IDC_SET_LEADCHNL_RV_PACING_CATHODE_LOCATION_1: NORMAL
MDC_IDC_SET_LEADCHNL_RV_PACING_POLARITY: NORMAL
MDC_IDC_SET_LEADCHNL_RV_PACING_PULSEWIDTH: 0.4 MS
MDC_IDC_SET_LEADCHNL_RV_SENSING_ANODE_ELECTRODE_1: NORMAL
MDC_IDC_SET_LEADCHNL_RV_SENSING_ANODE_LOCATION_1: NORMAL
MDC_IDC_SET_LEADCHNL_RV_SENSING_CATHODE_ELECTRODE_1: NORMAL
MDC_IDC_SET_LEADCHNL_RV_SENSING_CATHODE_LOCATION_1: NORMAL
MDC_IDC_SET_LEADCHNL_RV_SENSING_POLARITY: NORMAL
MDC_IDC_SET_LEADCHNL_RV_SENSING_SENSITIVITY: 0.9 MV
MDC_IDC_SET_ZONE_DETECTION_INTERVAL: 360 MS
MDC_IDC_SET_ZONE_TYPE: NORMAL
MDC_IDC_STAT_BRADY_DTM_END: NORMAL
MDC_IDC_STAT_BRADY_DTM_START: NORMAL
MDC_IDC_STAT_BRADY_RV_PERCENT_PACED: 35.47 %
MDC_IDC_STAT_EPISODE_RECENT_COUNT: 0
MDC_IDC_STAT_EPISODE_RECENT_COUNT: 0
MDC_IDC_STAT_EPISODE_RECENT_COUNT: 1
MDC_IDC_STAT_EPISODE_RECENT_COUNT_DTM_END: NORMAL
MDC_IDC_STAT_EPISODE_RECENT_COUNT_DTM_START: NORMAL
MDC_IDC_STAT_EPISODE_TOTAL_COUNT: 0
MDC_IDC_STAT_EPISODE_TOTAL_COUNT: 0
MDC_IDC_STAT_EPISODE_TOTAL_COUNT: 5
MDC_IDC_STAT_EPISODE_TOTAL_COUNT_DTM_END: NORMAL
MDC_IDC_STAT_EPISODE_TOTAL_COUNT_DTM_START: NORMAL
MDC_IDC_STAT_EPISODE_TYPE: NORMAL

## 2023-04-27 DIAGNOSIS — I10 HYPERTENSION GOAL BP (BLOOD PRESSURE) < 140/90: ICD-10-CM

## 2023-04-27 DIAGNOSIS — J45.40 MODERATE PERSISTENT ASTHMA WITHOUT COMPLICATION: ICD-10-CM

## 2023-04-27 RX ORDER — MONTELUKAST SODIUM 10 MG/1
1 TABLET ORAL DAILY
Qty: 90 TABLET | Refills: 1 | Status: SHIPPED | OUTPATIENT
Start: 2023-04-27 | End: 2023-11-01

## 2023-04-27 RX ORDER — METOPROLOL TARTRATE 25 MG/1
TABLET, FILM COATED ORAL
Qty: 180 TABLET | Refills: 1 | Status: SHIPPED | OUTPATIENT
Start: 2023-04-27 | End: 2023-11-01

## 2023-04-27 NOTE — TELEPHONE ENCOUNTER
Routing refill request to provider for review/approval because:  -- Blood pressure elevated    Blood pressure under 140/90 in past 12 months        BP Readings from Last 3 Encounters:   03/01/23 (!) 152/70   02/28/23 (!) 140/80   02/26/23 (!) 177/94     Last Written Prescription Date:  12/13/2022  Last Fill Quantity: 180,  # refills: 1   Last office visit: 1/26/2023 ; last virtual visit: Visit date not found with prescribing provider:  Dr. Rodriguez   Future Office Visit:   Next 5 appointments (look out 90 days)    May 30, 2023  8:30 AM  (Arrive by 8:10 AM)  Provider Visit with Dnay Rodriguez MD  [facility] ([facility] ) [address] [place] [address]  [phone]         KATHLEEN ZengN RN  [facility]

## 2023-04-28 ENCOUNTER — TELEPHONE (OUTPATIENT)
Dept: CARDIOLOGY | Facility: CLINIC | Age: 88
End: 2023-04-28
Payer: COMMERCIAL

## 2023-04-28 NOTE — ANESTHESIA POSTPROCEDURE EVALUATION
Patient: Shiraz Ingram    Procedure: Procedure(s):  RIGHT REVERSE SHOULDER ARTHROPLASTY, WITH OPEN REDUCTION INTERNAL FIXATION, WITH NO CUSTOM GUIDE       Anesthesia Type:  General    Note:  Disposition: Admission   Postop Pain Control: Uneventful            Sign Out: Well controlled pain   PONV: No   Neuro/Psych: Uneventful            Sign Out: Acceptable/Baseline neuro status   Airway/Respiratory: Uneventful            Sign Out: Acceptable/Baseline resp. status   CV/Hemodynamics: Uneventful            Sign Out: Acceptable CV status; No obvious hypovolemia; No obvious fluid overload   Other NRE: NONE   DID A NON-ROUTINE EVENT OCCUR? No           Last vitals:  Vitals Value Taken Time   /64 02/25/22 1640   Temp     Pulse 56 02/25/22 1648   Resp 17 02/25/22 1648   SpO2 92 % 02/25/22 1648   Vitals shown include unvalidated device data.    Electronically Signed By: Jenae Joseph MD  February 25, 2022  4:49 PM  
Normal for race

## 2023-05-02 DIAGNOSIS — I50.9 ACUTE ON CHRONIC CONGESTIVE HEART FAILURE, UNSPECIFIED HEART FAILURE TYPE (H): ICD-10-CM

## 2023-05-02 RX ORDER — ISOSORBIDE MONONITRATE 30 MG/1
30 TABLET, EXTENDED RELEASE ORAL DAILY
Qty: 90 TABLET | Refills: 1 | Status: SHIPPED | OUTPATIENT
Start: 2023-05-02 | End: 2023-12-04

## 2023-05-05 DIAGNOSIS — I50.31 ACUTE DIASTOLIC HEART FAILURE (H): ICD-10-CM

## 2023-05-05 RX ORDER — MAGNESIUM 64 MG (MAGNESIUM CHLORIDE) TABLET,DELAYED RELEASE
535 2 TIMES DAILY
Qty: 180 TABLET | Refills: 4 | Status: SHIPPED | OUTPATIENT
Start: 2023-05-05 | End: 2024-05-09

## 2023-05-09 DIAGNOSIS — E78.5 HYPERLIPIDEMIA WITH TARGET LDL LESS THAN 100: ICD-10-CM

## 2023-05-09 RX ORDER — SIMVASTATIN 40 MG
40 TABLET ORAL AT BEDTIME
Qty: 90 TABLET | Refills: 4 | Status: SHIPPED | OUTPATIENT
Start: 2023-05-09 | End: 2024-05-20

## 2023-05-30 ENCOUNTER — OFFICE VISIT (OUTPATIENT)
Dept: FAMILY MEDICINE | Facility: CLINIC | Age: 88
End: 2023-05-30
Payer: COMMERCIAL

## 2023-05-30 VITALS
OXYGEN SATURATION: 96 % | WEIGHT: 155 LBS | RESPIRATION RATE: 19 BRPM | TEMPERATURE: 97.4 F | HEART RATE: 50 BPM | DIASTOLIC BLOOD PRESSURE: 68 MMHG | SYSTOLIC BLOOD PRESSURE: 142 MMHG | BODY MASS INDEX: 25.83 KG/M2 | HEIGHT: 65 IN

## 2023-05-30 DIAGNOSIS — I35.0 SEVERE AORTIC STENOSIS: ICD-10-CM

## 2023-05-30 DIAGNOSIS — Z13.29 SCREENING FOR THYROID DISORDER: ICD-10-CM

## 2023-05-30 DIAGNOSIS — I44.2 COMPLETE HEART BLOCK (H): ICD-10-CM

## 2023-05-30 DIAGNOSIS — K22.11 ULCER OF ESOPHAGUS WITH BLEEDING: ICD-10-CM

## 2023-05-30 DIAGNOSIS — J45.40 MODERATE PERSISTENT ASTHMA WITHOUT COMPLICATION: ICD-10-CM

## 2023-05-30 DIAGNOSIS — I48.21 PERMANENT ATRIAL FIBRILLATION (H): ICD-10-CM

## 2023-05-30 DIAGNOSIS — I25.10 CORONARY ARTERY DISEASE INVOLVING NATIVE CORONARY ARTERY OF NATIVE HEART WITHOUT ANGINA PECTORIS: ICD-10-CM

## 2023-05-30 DIAGNOSIS — I10 HYPERTENSION GOAL BP (BLOOD PRESSURE) < 140/90: Primary | ICD-10-CM

## 2023-05-30 PROCEDURE — T1013 SIGN LANG/ORAL INTERPRETER: HCPCS | Mod: U3

## 2023-05-30 PROCEDURE — 99214 OFFICE O/P EST MOD 30 MIN: CPT | Mod: 25 | Performed by: FAMILY MEDICINE

## 2023-05-30 PROCEDURE — 91312 COVID-19 BIVALENT 12+ (PFIZER): CPT | Performed by: FAMILY MEDICINE

## 2023-05-30 PROCEDURE — 0124A COVID-19 BIVALENT 12+ (PFIZER): CPT | Performed by: FAMILY MEDICINE

## 2023-05-30 RX ORDER — DABIGATRAN ETEXILATE 150 MG/1
150 CAPSULE ORAL 2 TIMES DAILY
Qty: 180 CAPSULE | Refills: 3 | Status: CANCELLED | OUTPATIENT
Start: 2023-05-30

## 2023-05-30 RX ORDER — NITROGLYCERIN 0.4 MG/1
TABLET SUBLINGUAL
Qty: 12 TABLET | Refills: 0 | Status: SHIPPED | OUTPATIENT
Start: 2023-05-30 | End: 2024-05-20

## 2023-05-30 RX ORDER — BUDESONIDE 0.5 MG/2ML
0.5 INHALANT ORAL 2 TIMES DAILY
Qty: 120 ML | Refills: 3 | Status: SHIPPED | OUTPATIENT
Start: 2023-05-30

## 2023-05-30 RX ORDER — PANTOPRAZOLE SODIUM 40 MG/1
TABLET, DELAYED RELEASE ORAL
Qty: 180 TABLET | Refills: 1 | Status: SHIPPED | OUTPATIENT
Start: 2023-05-30 | End: 2023-11-01

## 2023-05-30 RX ORDER — HYDRALAZINE HYDROCHLORIDE 25 MG/1
12.5 TABLET, FILM COATED ORAL 3 TIMES DAILY
Qty: 135 TABLET | Refills: 3 | Status: CANCELLED | OUTPATIENT
Start: 2023-05-30

## 2023-05-30 RX ORDER — METOPROLOL TARTRATE 25 MG/1
25 TABLET, FILM COATED ORAL 2 TIMES DAILY
Qty: 180 TABLET | Refills: 1 | Status: CANCELLED | OUTPATIENT
Start: 2023-05-30

## 2023-05-30 RX ORDER — MULTIVITAMIN,THERAPEUTIC
1 TABLET ORAL 2 TIMES DAILY
Status: CANCELLED | OUTPATIENT
Start: 2023-05-30

## 2023-05-30 RX ORDER — ALBUTEROL SULFATE 90 UG/1
AEROSOL, METERED RESPIRATORY (INHALATION)
Qty: 18 G | Refills: 2 | Status: SHIPPED | OUTPATIENT
Start: 2023-05-30

## 2023-05-30 RX ORDER — ISOSORBIDE MONONITRATE 30 MG/1
30 TABLET, EXTENDED RELEASE ORAL DAILY
Qty: 90 TABLET | Refills: 1 | Status: CANCELLED | OUTPATIENT
Start: 2023-05-30

## 2023-05-30 RX ORDER — ACETAMINOPHEN 500 MG
1000 TABLET ORAL EVERY 8 HOURS PRN
Status: CANCELLED | OUTPATIENT
Start: 2023-05-30

## 2023-05-30 RX ORDER — IPRATROPIUM BROMIDE AND ALBUTEROL SULFATE 2.5; .5 MG/3ML; MG/3ML
3 SOLUTION RESPIRATORY (INHALATION) 4 TIMES DAILY
Qty: 360 ML | Refills: 11 | Status: SHIPPED | OUTPATIENT
Start: 2023-05-30

## 2023-05-30 RX ORDER — MAGNESIUM 64 MG (MAGNESIUM CHLORIDE) TABLET,DELAYED RELEASE
535 2 TIMES DAILY
Qty: 180 TABLET | Refills: 4 | Status: CANCELLED | OUTPATIENT
Start: 2023-05-30

## 2023-05-30 RX ORDER — MONTELUKAST SODIUM 10 MG/1
1 TABLET ORAL DAILY
Qty: 90 TABLET | Refills: 1 | Status: CANCELLED | OUTPATIENT
Start: 2023-05-30

## 2023-05-30 ASSESSMENT — ASTHMA QUESTIONNAIRES
ACT_TOTALSCORE: 21
QUESTION_3 LAST FOUR WEEKS HOW OFTEN DID YOUR ASTHMA SYMPTOMS (WHEEZING, COUGHING, SHORTNESS OF BREATH, CHEST TIGHTNESS OR PAIN) WAKE YOU UP AT NIGHT OR EARLIER THAN USUAL IN THE MORNING: NOT AT ALL
ACT_TOTALSCORE: 21
QUESTION_1 LAST FOUR WEEKS HOW MUCH OF THE TIME DID YOUR ASTHMA KEEP YOU FROM GETTING AS MUCH DONE AT WORK, SCHOOL OR AT HOME: NONE OF THE TIME
QUESTION_5 LAST FOUR WEEKS HOW WOULD YOU RATE YOUR ASTHMA CONTROL: COMPLETELY CONTROLLED
QUESTION_4 LAST FOUR WEEKS HOW OFTEN HAVE YOU USED YOUR RESCUE INHALER OR NEBULIZER MEDICATION (SUCH AS ALBUTEROL): THREE OR MORE TIMES PER DAY
QUESTION_2 LAST FOUR WEEKS HOW OFTEN HAVE YOU HAD SHORTNESS OF BREATH: NOT AT ALL

## 2023-05-30 ASSESSMENT — PAIN SCALES - GENERAL: PAINLEVEL: NO PAIN (0)

## 2023-05-30 NOTE — PATIENT INSTRUCTIONS
=======================================  FYI:     System will autorelease results as soon as they are available. I usually wait for all results to be ready before sending you a comment or message.  Be assured I will review and comment on all of your results as soon as I can.     I am at [facility]  ([phone]) usually Monday, Tuesday and Wednesday.   Messages, evisits, phone calls received on Thursday and Friday may not get responded very urgently but I will try to respond as soon as possible.     2) My schedule sometime been booking out far in advance. I apologize for the lack of timely access. If you need to be seen for a chronic condition or preventive (wellness) visit, please be sure to schedule that appointment few months in advance.  If you have a concern that you feel cannot wait until my next available appointment (such as a hospital follow-up or new symptom of concern) please ask to speak to one of the [place] nurses who may be able to access a sooner appointment.      You can schedule a video visit for follow-up appointments as well as future appointments for certain conditions.  Please see the below link.     Video Visits ([email or web address])     If you have not already done so,  I encourage you to sign up for [email or web address] ([email or web address]).  This will allow you to review your results, securely communicate with a provider, and schedule virtual visits as well.

## 2023-05-30 NOTE — PROGRESS NOTES
Assessment & Plan     Hypertension goal BP (blood pressure) < 140/90  Blood pressure is just above goal but due to his advanced age and multiple risk factors I am not going to adjust his medications for now.  He is scheduled to see cardiologist next month.  Stick to the same Rx for now.    Severe aortic stenosis  Asymptomatic.  Seeing cardiologist.  Refill nitroglycerin.  - nitroGLYcerin (NITROSTAT) 0.4 MG sublingual tablet; For chest pain place 1 tablet under the tongue every 5 minutes for 3 doses. If symptoms persist 5 minutes after 1st dose call 911.    Complete heart block (H)  Asymptomatic.  Seeing cardiologist.  Refill nitroglycerin.  - nitroGLYcerin (NITROSTAT) 0.4 MG sublingual tablet; For chest pain place 1 tablet under the tongue every 5 minutes for 3 doses. If symptoms persist 5 minutes after 1st dose call 911.    Permanent atrial fibrillation (H)  Asymptomatic.  Seeing cardiologist.  Refill nitroglycerin.  - nitroGLYcerin (NITROSTAT) 0.4 MG sublingual tablet; For chest pain place 1 tablet under the tongue every 5 minutes for 3 doses. If symptoms persist 5 minutes after 1st dose call 911.    Moderate persistent asthma without complication  Patient is tolerating current medication without any major side effects of concerns and current dose seems reasonable too.  Current medication regime is effective. Continue current treatment without any changes.   - ipratropium - albuterol 0.5 mg/2.5 mg/3 mL (DUONEB) 0.5-2.5 (3) MG/3ML neb solution; Inhale 1 vial (3 mLs) into the lungs 4 times daily  - budesonide (PULMICORT) 0.5 MG/2ML neb solution; Take 2 mLs (0.5 mg) by nebulization 2 times daily  - albuterol (PROAIR HFA/PROVENTIL HFA/VENTOLIN HFA) 108 (90 Base) MCG/ACT inhaler; INHALE 1 TO 2 PUFFS BY MOUTH EVERY 4 TO 6 HOURS AS NEEDED    Coronary artery disease involving native coronary artery of native heart without angina pectoris  Refilled nitroglycerin.     Ulcer of esophagus with bleeding   on long term PPI.  "Benefit>risk with his esophageal bleeding history.   - pantoprazole (PROTONIX) 40 MG EC tablet; TAKE 1 TABLET BEFORE MEALS TWICE A DAY Strength: 40 mg    Screening for thyroid disorder     - TSH with free T4 reflex; Future                 Dany Rodriguez MD, MD  [facility]    Javon Weldon is a 88 year old, presenting for the following health issues:  RECHECK (From appt on feb 28th)        5/30/2023     8:18 AM   Additional Questions   Roomed by Shayla FAN     History of Present Illness       Reason for visit:  Follow up from Feb.    He eats 2-3 servings of fruits and vegetables daily.He consumes 0 sweetened beverage(s) daily.He exercises with enough effort to increase his heart rate 30 to 60 minutes per day.  He exercises with enough effort to increase his heart rate 7 days per week.   He is taking medications regularly.     .     Some diarrhea but it is not new. c diff treatment helped. Symptoms were less intense.     Cardiologist in March and going back next month.     Leg swelling sometimes more. Does not wear compression socks regularly.   Weight is stable. Checking at home.   breathing is fine.     128/88 this morning bp.     Review of Systems         Objective    BP (!) 163/68 (BP Location: Right arm, Patient Position: Sitting, Cuff Size: Adult Regular)   Pulse 50   Temp 97.4  F (36.3  C) (Temporal)   Resp 19   Ht 1.651 m (5' 5\")   Wt 70.3 kg (155 lb)   SpO2 96%   BMI 25.79 kg/m    Body mass index is 25.79 kg/m .  Physical Exam   Heart RRR  Lungs clear   both leg pitting edema present   Alert, not in acute distress. Communicating via .                     "

## 2023-07-05 NOTE — ANESTHESIA PREPROCEDURE EVALUATION
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     CKD (chronic kidney disease) stage 3, GFR 30-59 ml/min (H) 2011     Community acquired pneumonia 2020     Coronary atherosclerosis of unspecified type of vessel, native or graft     s/p CABG      Esophageal ulcer w UGI bleed 2020    Had EGD adn caurtery 20, colonoscopy with diverticulosis then     Hyperlipidemia LDL goal < 100      Hypertension goal BP (blood pressure) < 140/90      Localized osteoarthrosis not specified whether primary or secondary, lower leg     left knee     Mild persistent asthma without complication 2016     Moderate persistent asthma      Persistent atrial fibrillation (H)      Unspecified hearing loss       Past Surgical History:   Procedure Laterality Date     CARDIAC SURGERY       COLONOSCOPY N/A 2020    Procedure: COLONOSCOPY;  Surgeon: Ignacio Fournier MD;  Location:  GI     CV ANGIOGRAM CORONARY GRAFT N/A 2020    Procedure: Angiogram Coronary Graft;  Surgeon: Addison Willard MD;  Location:  HEART CARDIAC CATH LAB     CV CORONARY ANGIOGRAM N/A 2020    Procedure: Coronary Angiogram;  Surgeon: Addison Willard MD;  Location:  HEART CARDIAC CATH LAB     CV RIGHT HEART CATH MEASUREMENTS RECORDED N/A 2020    Procedure: Right Heart Cath;  Surgeon: Addison Willard MD;  Location:  HEART CARDIAC CATH LAB     CV TRANSCATHETER AORTIC VALVE REPLACEMENT N/A 2020    Procedure: Transcatheter Aortic Valve Replacement;  Surgeon: Soraida Monet MD;  Location:  HEART CARDIAC CATH LAB     EP PACEMAKER N/A 7/15/2020     Attempted to contact patient to schedule appointment. Per  she would like to do a video visit today with patient. Will offer 10:45am if patient calls back.    Procedure: EP Pacemaker;  Surgeon: Tommie Sauer MD;  Location:  HEART CARDIAC CATH LAB     HC ESOPHAGOSCOPY, DIAGNOSTIC  5/03    Dr. Dow, lower esophageal ring & mild gastritis     HERNIORRHAPHY INGUINAL Right 9/26/2016    Procedure: HERNIORRHAPHY INGUINAL;  Surgeon: Alexis Alexandra MD;  Location: [facility]     LAPAROSCOPIC CHOLECYSTECTOMY  12/03    for biliary sludge     REVERSE ARTHROPLASTY SHOULDER Right 2/25/2022    Procedure: RIGHT REVERSE SHOULDER ARTHROPLASTY, WITH OPEN REDUCTION INTERNAL FIXATION, WITH NO CUSTOM GUIDE;  Surgeon: Wiley Vargas MD;  Location:  OR     Z NONSPECIFIC PROCEDURE  5/02    Coronary artery bypass     [facility] COLONOSCOPY THRU STOMA W BIOPSY/CAUTERY TUMOR/POLYP/LESION  5/03    Dr. Dow, Q 5 years     [facility] COLONOSCOPY THRU STOMA W BIOPSY/CAUTERY TUMOR/POLYP/LESION  12/199    Dr. Dow, one adenomatous polyp      No Known Allergies   Social History     Tobacco Use     Smoking status: Never     Smokeless tobacco: Never     Tobacco comments:     smoked for a week in 20's   Substance Use Topics     Alcohol use: Not Currently     Alcohol/week: 0.0 standard drinks      Wt Readings from Last 1 Encounters:   01/13/23 72.6 kg (160 lb)        Prior to Admission medications    Medication Sig Start Date End Date Taking? Authorizing Provider   acetaminophen (TYLENOL) 500 MG tablet Take 1,000 mg by mouth 2 times daily   Yes Reported, Patient   albuterol (PROAIR HFA/PROVENTIL HFA/VENTOLIN HFA) 108 (90 Base) MCG/ACT inhaler INHALE 1 TO 2 PUFFS BY MOUTH EVERY 4 TO 6 HOURS AS NEEDED 9/2/21  Yes Ty Wing, DO   aspirin (ASA) 81 MG EC tablet Take 81 mg by mouth daily   Yes Reported, Patient   budesonide (PULMICORT) 0.5 MG/2ML nebulizer solution Take 2 mLs (0.5 mg) by nebulization 2 times daily 6/1/15  Yes Dany Rodriguez MD   calcium carbonate 600 mg-vitamin D 400 units (CALTRATE) 600-400 MG-UNIT per tablet Take 1 tablet by mouth daily   Yes Unknown, Entered By  History   dabigatran ANTICOAGULANT (PRADAXA) 150 MG capsule Take 1 capsule (150 mg) by mouth 2 times daily 2/12/21  Yes Amee Rocha PA-C   ferrous sulfate (IRON) 325 (65 Fe) MG tablet TAKE 1 TABLET(325 MG) BY MOUTH TWICE DAILY 4/18/18  Yes Dany Rodriguez MD   hydrALAZINE (APRESOLINE) 25 MG tablet Take 0.5 tablets (12.5 mg) by mouth 3 times daily 8/23/21  Yes Jaxon Field MD   ipratropium - albuterol 0.5 mg/2.5 mg/3 mL (DUONEB) 0.5-2.5 (3) MG/3ML nebulization Inhale 1 vial (3 mLs) into the lungs 4 times daily 8/5/14  Yes Dany Rodriguez MD   isosorbide mononitrate (IMDUR) 30 MG 24 hr tablet Take 1 tablet (30 mg) by mouth daily 1/11/22  Yes Jaxon Field MD   magnesium chloride (MAG64) 535 (64 Mg) MG TBEC CR tablet Take 1 tablet (535 mg) by mouth 2 times daily 4/27/21  Yes Gustavo Tierney MD   melatonin 3 MG tablet Take 1 tablet (3 mg) by mouth nightly as needed for sleep 7/29/20  Yes Nnamdi Sears MD   metoprolol tartrate (LOPRESSOR) 25 MG tablet TAKE 1 TABLET BY MOUTH TWICE A DAY 1/3/22  Yes Dany Rodriguez MD   montelukast (SINGULAIR) 10 MG tablet TAKE 1 TABLET BY MOUTH EVERY DAY 9/17/21  Yes Dany Rodriguez MD   multivitamin, therapeutic (THERA-VIT) TABS tablet Take 1 tablet by mouth every evening    Yes Unknown, Entered By History   nitroGLYcerin (NITROSTAT) 0.4 MG sublingual tablet For chest pain place 1 tablet under the tongue every 5 minutes for 3 doses. If symptoms persist 5 minutes after 1st dose call 911. 7/16/20  Yes Bozena Groves MD   pantoprazole (PROTONIX) 40 MG EC tablet TAKE 1 TABLET BEFORE MEALS TWICE A DAY 12/17/21  Yes Dany Rodriguez MD   simvastatin (ZOCOR) 40 MG tablet Take 1 tablet (40 mg) by mouth At Bedtime 5/17/21  Yes Gustavo Tierney MD   torsemide (DEMADEX) 5 MG tablet Take 2 tablets (10 mg) by mouth daily 12/8/21  Yes Amee Rocha PA-C     Recent Labs   Lab Test 03/15/21  1254   ABO A   RH Pos      Recent Results (from the past 744 hour(s))   Cardiac Device Check - In Clinic   Result Value    Date Time Interrogation Session [id number]    Implantable Pulse Generator  Medtronic    Implantable Pulse Generator Model [id number] Nadya XT SR MRI    Implantable Pulse Generator Serial Number [id number]    Type Interrogation Session In Clinic    Clinic Name [facility]    Implantable Pulse Generator Type Pacemaker    Implantable Pulse Generator Implant Date 20200715    Implantable Lead  Medtronic    Implantable Lead Model [id number] CapSureFix Novus MRI SureScan    Implantable Lead Serial Number [id number]    Implantable Lead Implant Date 20200715    Implantable Lead Polarity Type Bipolar Lead    Implantable Lead Location Detail 1 UNKNOWN    Implantable Lead Location Right Ventricle    Jori Setting Mode (NBG Code) VVI    Jori Setting Lower Rate Limit 50    Jori Setting Hysterisis Rate DISABLED    Lead Channel Setting Sensing Polarity Bipolar    Lead Channel Setting Sensing Anode Location Right Ventricle    Lead Channel Setting Sensing Anode Terminal Ring    Lead Channel Setting Sensing Cathode Location Right Ventricle    Lead Channel Setting Sensing Cathode Terminal Tip    Lead Channel Setting Sensing Sensitivity 0.9    Lead Channel Setting Pacing Polarity Bipolar    Lead Channel Setting Pacing Anode Location Right Ventricle    Lead Channel Setting Pacing Anode Terminal Ring    Lead Channel Setting Sensing Cathode Location Right Ventricle    Lead Channel Setting Sensing Cathode Terminal Tip    Lead Channel Setting Pacing Pulse Width 0.4    Lead Channel Setting Pacing Amplitude 2    Lead Channel Setting Pacing Capture Mode Adaptive    Zone Setting Type Category VF    Zone Setting Type Category VT    Zone Setting Type Category VT    Zone Setting Type Category VT    Zone Setting Detection Interval 360    Zone Setting Type Category ATRIAL_FIBRILLATION    Zone Setting Type Category AT/AF    Lead  Channel Impedance Value 475    Lead Channel Impedance Value 323    Lead Channel Sensing Intrinsic Amplitude 10.25    Lead Channel Sensing Intrinsic Amplitude 8.125    Lead Channel Pacing Threshold Amplitude 1    Lead Channel Pacing Threshold Pulse Width 0.4    Battery Date Time of Measurements 20230110071502    Battery Status OK    Battery RRT Trigger 2.625    Battery Remaining Longevity 156    Battery Voltage 3.05    Jori Statistic Date Time Start 20211021111937    Jori Statistic Date Time End 20230110091056    Jori Statistic RV Percent Paced 40.58    Episode Statistic Recent Count 1    Episode Statistic Type Category VT    Episode Statistic Recent Count 0    Episode Statistic Type Category VT    Episode Statistic Recent Date Time Start 20211021111937    Episode Statistic Recent Date Time End 20230110091056    Episode Statistic Recent Date Time Start 20211021111937    Episode Statistic Recent Date Time End 20230110091056    Episode Statistic Total Count 4    Episode Statistic Type Category VT    Episode Statistic Total Count 0    Episode Statistic Type Category VT    Episode Statistic Total Date Time Start 18525370291343    Episode Statistic Total Date Time End 20230110091056    Episode Statistic Total Date Time Start 20200715153117    Episode Statistic Total Date Time End 20230110091056    Narrative    Medtronic Nadya (S) Pacemaker Device Check  Patient seen in clinic for device evaluation and iterative programming.   : 40.6%  Mode: VVI 50  Underlying Rhythm: AF with V rates 40's-80's bpm  Heart Rate: Histogram shows fair variability.  Pt denies shortness of   breath with activity.  Sensing:  WNL  Pacing Threshold: WNL  Impedance: WNL  Battery Status: Estimated 12.9 years  Device Site: well healed  Atrial Arrhythmia: known AF, pt states he is compliant with his Pradaxa  Ventricular Arrhythmia: One episode logged from 9/6/22, EGM shows 6 beat   NSVT, pt recalls no symptoms from episode.  Pt has had an episode  of NSVT   before.  Most recent ECHO shows EF 45-50% from 6/20/22.  Pt states he is   compliant with his Lopressor.    Setting Change: None    Care Plan: Pt scheduled to see Dr ASTUDILLO 1/31/23 and his next remote device   check is scheduled for 4/11/23.  All results and testing were discussed   with the pt with the assistance of an in-person .  JOSE Rinaldi    I have reviewed and interpreted the device interrogation, settings,   programming and nurse's summary. The device is functioning within normal   device parameters. I agree with the current findings, assessment and plan.   Lumbar spine CT w/o contrast    Narrative    EXAM: CT LUMBAR SPINE W/O CONTRAST  LOCATION: [facility]  DATE/TIME: 1/11/2023 6:46 PM    INDICATION: Fall, low back pain.  COMPARISON: Lumbar spine CT 9/28/2020.  TECHNIQUE: Routine CT Lumbar Spine without IV contrast. Multiplanar reformats. Dose reduction techniques were used.     FINDINGS:  VERTEBRA: 5 lumbar type vertebrae. Evolution of a compression fracture involving the superior aspect of the L2 vertebral body again noted without significant change and loss of height. Large Schmorl's node deformity in the superior aspect of the L3   vertebral body again noted without change. There is new superior endplate degenerative/fracture deformity of the L5 vertebral body that does not appear acute. Vertebral body heights and contours of the lumbar spine are otherwise normal. Chronic   compression fracture deformity of T12 again noted. There has been worsening of degenerative anterolisthesis of L4 upon L5. Alignment otherwise normal. No spinal canal stenosis.    PARASPINAL: No extraspinal abnormality.      Impression    IMPRESSION:  1.  Chronic compression fracture deformities of T12 and L2 again noted.  2.  Interval development of a chronic appearing bony deformity in the superior aspect of the L5 vertebral body that could be degenerative or traumatic in nature.  3.   No recent fractures.  4.  Worsening degenerative anterolisthesis of L4 upon L5.  5.  No spinal canal stenosis.   CT Abdomen Pelvis w/o Contrast    Narrative    CT ABDOMEN PELVIS W/O CONTRAST 1/13/2023 3:25 PM    CLINICAL HISTORY: LLQ abd pain  TECHNIQUE: CT scan of the abdomen and pelvis was performed without IV  contrast. Multiplanar reformats were obtained. Dose reduction  techniques were used.  CONTRAST: None.    COMPARISON: CT 9/28/2020    FINDINGS:   LOWER CHEST: Stable size of right lower lobe pulmonary nodules, not  significantly changed since 2020, no specific follow-up recommended  given long-term size stability. Enlarged heart. Prior aortic valve  replacement and median sternotomy. Pacemaker lead partially  visualized.    HEPATOBILIARY: Cholecystectomy.    PANCREAS: Normal.    SPLEEN: Normal.    ADRENAL GLANDS: Normal.    KIDNEYS/BLADDER: Likely bilateral renal cysts, several of which are  hemorrhagic/proteinaceous, no specific follow-up recommended. No  hydronephrosis. Urinary bladder is unremarkable.    BOWEL: Dilation of proximal and mid small bowel with focal transition  point within a left femoral hernia. There is likely fluid within the  hernia sac with some surrounding fat stranding. Distal small bowel and  colon is decompressed. Unable to evaluate for bowel wall enhancement  without intravenous contrast. There is no pneumatosis or  pneumoperitoneum.    LYMPH NODES: Normal.    VASCULATURE: Severe calcified atherosclerosis. Ectasia of the  infrarenal abdominal aorta without kevin aneurysmal dilation.    PELVIC ORGANS: Prostatomegaly.    OTHER: None.    MUSCULOSKELETAL: No definite acute bony abnormality. L5 compression  deformity is new in comparison to 2020 but appears chronic. Additional  old thoracolumbar compression fractures are not significantly changed.      Impression    IMPRESSION:   1.  Small bowel obstruction secondary to bowel-containing left femoral  hernia with CT findings suggestive  of incarceration. Recommend  surgical consultation.    SANDRA DEL TORO MD         SYSTEM ID:  [id number]   XR Chest Port 1 View    Narrative    EXAM: XR CHEST PORT 1 VIEW  LOCATION: [facility]  DATE/TIME: 1/13/2023 6:07 PM    INDICATION: Hypoxia.  COMPARISON: 10/08/2022.      Impression    IMPRESSION: Since 10/08/2022, the prior seen right lung infiltrate and tiny right pleural effusion have resolved. There is a new NG tube and distal aspect is not included on this study to verify location. There is a new slight left pleural effusion with   minimal underlying infiltrate/atelectasis in the left lower lobe. Otherwise the chest is stable with again seen transvenous pacer of the lead tip. Pulmonary vascularity is upper limits of normal. Partial calcified thoracic aorta. Postoperative changes   right shoulder.       Anesthesia Evaluation   Pt has had prior anesthetic. Type: General (Mac 4 = Grade 1 View previously).    No history of anesthetic complications       ROS/MED HX  ENT/Pulmonary: Comment: Deaf - Uses Sign     No rib fractures taken after fall - Left elevated hemidiaphragm noted.     (+) Moderate Persistent, asthma Treatment: Inhaler daily,   (-) sleep apnea and recent URI   Neurologic:    (-) no seizures and no CVA   Cardiovascular: Comment: Interpretation Summary     Left ventricular systolic function is mildly reduced.  The visual ejection fraction is 45-50%.  There is a bioprosthetic aortic valve. 29mm Medtronic Evolut Pro, implanted  7/2020  The mean AoV pressure gradient is 9mmHg.  Right ventricular systolic pressure is elevated, consistent with mild  pulmonary hypertension.  Compared to 6/21, mean gradients across bioprosthetic aortic valve is  unchanged. EF is likley unchanged. The study was technically difficult.    (+) Dyslipidemia hypertension--CAD -past MI CABG-date: 2002. -CHF Last EF: 45-50% date: 6/2021 pacemaker, Reason placed: CHB. dysrhythmias, 3rd Deg Heart  Block, valvular problems/murmurs type: AS s/p TAVR. pulmonary hypertension, Previous cardiac testing   Echo: Date: 6/2021 Results:  Hypokinesis of the basal and mid inferolateral wall and basal inferior wall.  Left ventricular systolic function is mildly reduced.  The visual ejection fraction is estimated at 45-50%.  Diastolic Doppler findings (E/E' ratio and/or other parameters) suggest left  ventricular filling pressures are increased.  The right ventricle is mildly dilated.  Moderately decreased right ventricular systolic function  There is a bioprosthetic aortic valve (s/p TAVR, 29mm Medtronic Evolut Pro,  implanted 7/2020).  There is trace to mild aortic regurgitation. There appear to be two small jets  of perivalvular regurgitation.  The gradient is normal for this prosthetic aortic valve.  There is mild to moderate (1-2+) tricuspid regurgitation.  Right ventricular systolic pressure is elevated, consistent with moderate  pulmonary hypertension.  Compared to prior study, there is no significant change.  Stress Test: Date: Results:    ECG Reviewed: Date: Results:    Cath: Date: Results:   (-) angina, orthopnea/PND, syncope, angina and wheezes   METS/Exercise Tolerance: >4 METS    Hematologic:     (+) anemia, history of blood transfusion, no previous transfusion reaction,     Musculoskeletal:       GI/Hepatic:     (+) GERD,     Renal/Genitourinary:     (+) renal disease, type: CRI,     Endo:    (-) Type II DM, thyroid disease and chronic steroid usage   Psychiatric/Substance Use:       Infectious Disease:       Malignancy:       Other:            Physical Exam    Airway        Mallampati: II   TM distance: < 3 FB   Neck ROM: full   Mouth opening: > 3 cm    Respiratory Devices and Support         Dental           Cardiovascular          Rhythm and rate: irregular and normal   (+) murmur       Pulmonary           breath sounds clear to auscultation   (-) no rhonchi and no wheezes        OUTSIDE LABS:  CBC:   Lab  Results   Component Value Date    WBC 5.5 01/13/2023    WBC 3.6 (L) 12/13/2022    HGB 11.5 (L) 01/13/2023    HGB 10.9 (L) 12/13/2022    HCT 38.0 (L) 01/13/2023    HCT 35.0 (L) 12/13/2022     (L) 01/13/2023     12/13/2022     BMP:   Lab Results   Component Value Date     01/13/2023     10/31/2022    POTASSIUM 4.7 01/13/2023    POTASSIUM 4.8 10/31/2022    CHLORIDE 104 01/13/2023    CHLORIDE 110 (H) 10/31/2022    CO2 28 01/13/2023    CO2 23 10/31/2022    BUN 48 (H) 01/13/2023    BUN 38 (H) 10/31/2022    CR 1.94 (H) 01/13/2023    CR 1.52 (H) 10/31/2022     (H) 01/13/2023     (H) 10/31/2022     COAGS:   Lab Results   Component Value Date    INR 1.51 (H) 03/15/2021     POC:   Lab Results   Component Value Date     (H) 03/16/2021     HEPATIC:   Lab Results   Component Value Date    ALBUMIN 3.8 01/13/2023    PROTTOTAL 8.2 01/13/2023    ALT 24 01/13/2023    AST 24 01/13/2023    ALKPHOS 89 01/13/2023    BILITOTAL 0.7 01/13/2023     OTHER:   Lab Results   Component Value Date    PH 7.28 (L) 10/08/2022    LACT 1.3 01/13/2023    AYALA 10.1 01/13/2023    PHOS 4.6 (H) 10/29/2020    MAG 1.8 12/16/2020    LIPASE 67 (L) 01/13/2023    TSH 1.68 03/15/2021    CRP 32.9 (H) 03/15/2021    SED 65 (H) 03/15/2021       Anesthesia Plan    ASA Status:  4, emergent    NPO Status:  NPO Appropriate    Anesthesia Type: General.     - Airway: ETT   Induction: Intravenous, Propofol.   Maintenance: Balanced.   Techniques and Equipment:     - Airway: Video-Laryngoscope         Consents    Anesthesia Plan(s) and associated risks, benefits, and realistic alternatives discussed. Questions answered and patient/representative(s) expressed understanding.    - Discussed:     - Discussed with:  Patient,       - Specific Concerns: Discussed potential of breathing difficulty after nerve block - given asthma and left elevated hemidiaphragm.  Discussed R/B/A of needing likely a lot of narcotics to control  pain if ISB not done.  May need BIPAP postoperatively, but unlikely. .   Use of blood products discussed: Yes.            Reason for refusal: other.     Postoperative Care       PONV prophylaxis: Ondansetron (or other 5HT-3), Dexamethasone or Solumedrol, Background Propofol Infusion     Comments:                    Kylah Joya

## 2023-07-21 NOTE — DISCHARGE INSTRUCTIONS
TAVR Discharge Instructions  You have just had your aortic valve replaced with a new biological tissue valve. You should feel better after your surgery, but complete recovery may take several weeks. Please follow these instructions carefully and please call the [facility] ([phone]) with any questions or concerns.      AFTER YOU GO HOME:    Drink extra fluids for 2 days.    You may resume your normal diet.    Do not smoke.    Do not drink alcohol.    Relax and take it easy.    Do not make any important or legal decisions.    FOR 1 WEEK AFTER TAVR:    Do not drive or operate machines at home or at work. No driving until you return for your 1 week follow-up appointment. You and the provider will discuss this at the appointment.     Refrain from sexual intercourse for 1 week.    You may shower the day after your procedure. Do NOT take a bath, or use a hot tub or pool for at least 1 week. Do NOT scrub the site. Do not use lotion or powder near the puncture site.    Do not lift more than 10 pounds.     Do not do any heavy activity such as exercise, lifting, or straining.  No housework, yard work, or any activities that make you sweat.    CARE OF WRIST AND GROIN SITES:    For 2 days, when you cough, sneeze, laugh or move your bowels, hold your hand over the puncture site and press firmly on/above the site.    Remove the bandage after 24 hours. If there is minor oozing, apply another bandage and remove it after 12 hours.    It is normal to have bruising or pea size lump at the site.    If you have a wrist site puncture: Do not use your hand or arm to support your weight (such as rising from a chair)or bend your wrist (such as lifting a garage door) for 1 week.    No stooping or squatting for 1 week.     BLEEDING:  If you start bleeding from the site in your wrist:    Sit down and press firmly on/above the site with your fingers for 10 minutes.     Once bleeding stops, keep your arm still  for 2 hours.     Call [facility] ([phone]) as soon as you can.  If you start bleeding from the site in your groin:    Lie down flat and press firmly on/above the site for 10 minutes.    Once bleeding stops lay flat for 2 hours.     Call [facility] ([phone]) as soon as you can.  Call 911 right away if you have heavy bleeding or bleeding that does not stop.    MEDICINES:    You will likely be started on an anti-platelet medication, such as Plavix. Please call the [facility] with any questions regarding this medication.    If you have stopped any medicines, check with your provider about when to restart them.    You will require lifetime prophylactic antibiotics for dental cleanings and dental work after your TAVR, please inquire with your provider prior to your scheduled cleanings.     FOLLOW-UP APPOINTMENTS:    Follow-up with a Structural Heart Nurse Practitioner at [facility] in 7-10 days after your procedure.    You will also follow-up at [facility] 1 month after your procedure which will include a visit with a nurse practitioner, an echocardiogram (Echo), and electrocardiogram (ECG), and lab work. We will also want to see you back in the clinic 1 year after your procedure.    Cardiac Rehab will contact you for follow up care. You can enroll at a site convenient to you.     CALL THE CLINIC IF:     You have increased pain or a large or growing hard lump around the site.    The site is red, swollen, hot, or tender.    Blood or fluid is draining from the site.    You have chills or a fever greater than 101 F (38 C).    Your arm or leg feels numb, cool, or changes color.    You have hives, a rash, or unusual itching.    New pain in the back or belly that you cannot control with Tylenol.    Any questions or concerns.    OTHER INSTRUCTIONS:    You will receive a card with your new valve information in the  mail, directly from the .     CONTACT INFORMATION:   [facility]:  [phone] (7 days a week)  Valve Coordinators (Arely RN, Stephanie RN, and Rickey RN) can be reached at:  [phone]    Pacemaker Implant Discharge Instructions     After you go home:      Have an adult stay with you until tomorrow.    You may resume your normal diet.       For 24 hours - due to the sedation you received:    Relax and take it easy.    Do NOT make any important or legal decisions.    Do NOT drive or operate machines at home or at work.    Do NOT drink alcohol.    Care of Chest Incision:      Keep the bandage on at least 3 days. You may remove the dressing on ***. Change it only if it gets loose or soaked. If you need to change it, use 4x4-inch gauze and a large clear bandage.     If there is a pressure dressing (gauze & tape) - 24 hours after your procedure you may remove ONLY the top dressing. Leave the bottom dressing on.    Leave the strips of tape on. They will fall off on their own, or we will remove them at your first check-up.    Check your wound daily for signs of infection, such as increased redness, severe swelling or draining. Fever may also be a sign of infection. Call us if you see any of these signs.    If there are no signs of infection, you may shower after the bandage comes off in 3 days. If you take a tub bath, keep the wound dry.    No soaking the incision (swimming pool, bathtub, hot tub) for 2 weeks.    You may have mild to medium pain for 3 to 5 days. Take Acetaminophen (Tylenol) or Ibuprofen (Advil) for the pain. If the pain persists or is severe, call us.    Activity:      For at least 2 weeks: Do not raise your elbow above your shoulder. You can begin to use your arm as it feels comfortable to you.    Do not use arm on implant side to lift more than 10 pounds for 2 weeks.    In 6 to 8 weeks: You may begin to golf, play tennis, swim and do similar activities.    No driving  for one day & limit to necessary driving for one week.    Bleeding:      If you start bleeding from the incision site, sit down and press firmly on the site for 10 minutes.     Once bleeding stops, call [facility] as soon as you can.       Call 911 right away if you have heavy bleeding or bleeding that does not stop.      Medicines:      Take your medications, including blood thinners, unless your provider tells you not to.    If you have stopped any medicines, check with your provider about when to restart them.    Follow Up Appointments:      Follow up with Device Clinic at [facility] of patient preference in 7-10 days.    Call the clinic if:      You have a large or growing hard lump around the site.    The site is red, swollen, hot or tender.    Blood or fluid is draining from the site.    You have chills or a fever greater than 101 F (38 C).    You feel dizzy or light-headed.    Questions or concerns    Telling others about your device:      Before you leave the hospital, you will receive a temporary ID card. A permanent card will be mailed to you about 6 to 8 weeks later. Always carry the ID card with you. It has important details about your device.    You may also get a Medical Alert bracelet or tag that says you have a pacemaker.  Go to [email or web address].     Always tell doctors, dentists and other care providers that you have a device implanted in you.    Let us know before you plan any surgeries. Your care team must take special steps to keep you safe during certain procedures. These steps will depend on the type of device you have. Your provider will need to see your ID card. They may need to call us for instructions.    Device Safety:      Please refer to device  s booklet for further information.        [facility] [place]:    [phone] [facility] (7 days a week)                 86.4

## 2023-07-25 ENCOUNTER — ANCILLARY PROCEDURE (OUTPATIENT)
Dept: CARDIOLOGY | Facility: CLINIC | Age: 88
End: 2023-07-25
Attending: INTERNAL MEDICINE
Payer: COMMERCIAL

## 2023-07-25 DIAGNOSIS — I49.5 SSS (SICK SINUS SYNDROME) (H): ICD-10-CM

## 2023-07-25 DIAGNOSIS — Z95.0 CARDIAC PACEMAKER IN SITU: ICD-10-CM

## 2023-07-25 PROCEDURE — 93296 REM INTERROG EVL PM/IDS: CPT | Performed by: INTERNAL MEDICINE

## 2023-07-25 PROCEDURE — 93294 REM INTERROG EVL PM/LDLS PM: CPT | Performed by: INTERNAL MEDICINE

## 2023-07-31 ENCOUNTER — OFFICE VISIT (OUTPATIENT)
Dept: CARDIOLOGY | Facility: CLINIC | Age: 88
End: 2023-07-31
Attending: INTERNAL MEDICINE
Payer: COMMERCIAL

## 2023-07-31 VITALS
BODY MASS INDEX: 25.24 KG/M2 | DIASTOLIC BLOOD PRESSURE: 80 MMHG | OXYGEN SATURATION: 95 % | HEIGHT: 65 IN | SYSTOLIC BLOOD PRESSURE: 130 MMHG | WEIGHT: 151.5 LBS | HEART RATE: 56 BPM

## 2023-07-31 DIAGNOSIS — I50.42 CHRONIC COMBINED SYSTOLIC AND DIASTOLIC HEART FAILURE (H): ICD-10-CM

## 2023-07-31 DIAGNOSIS — Z95.2 S/P TAVR (TRANSCATHETER AORTIC VALVE REPLACEMENT): ICD-10-CM

## 2023-07-31 DIAGNOSIS — I50.9 ACUTE ON CHRONIC CONGESTIVE HEART FAILURE, UNSPECIFIED HEART FAILURE TYPE (H): ICD-10-CM

## 2023-07-31 DIAGNOSIS — I48.20 CHRONIC ATRIAL FIBRILLATION (H): ICD-10-CM

## 2023-07-31 PROCEDURE — 99213 OFFICE O/P EST LOW 20 MIN: CPT | Performed by: INTERNAL MEDICINE

## 2023-07-31 PROCEDURE — T1013 SIGN LANG/ORAL INTERPRETER: HCPCS | Mod: U3 | Performed by: INTERNAL MEDICINE

## 2023-07-31 RX ORDER — HYDRALAZINE HYDROCHLORIDE 25 MG/1
12.5 TABLET, FILM COATED ORAL 3 TIMES DAILY
Qty: 135 TABLET | Refills: 3 | Status: ON HOLD | OUTPATIENT
Start: 2023-07-31 | End: 2024-02-11

## 2023-07-31 NOTE — PROGRESS NOTES
HPI and Plan:   Very pleasant 88-year-old gentleman who I know very well and has the following conditions and I am following up on    1. 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   2.  CKD-III; with a creatinine at baseline ~1.4.   3.  Hypertension, controlled.   4.  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  Mean gradient usually around 9 mmHg.    5.  Chronic atrial fibrillation with history of MAYA thrombus, on anticoagulation with Pradaxa.  Controlled heart rate.  6. CAD, s/p CABG. 2002 [LIMA -LAD, VG-OM and the RCA  7. Hyperlipidemia, well-controlled as of 9/2021.  8. JOSÉ ANTONIO diagnosed 2022.    Doing very well at this time.  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  Did he looks very well.  This is a very different individual from the one who used to come and see as almost every month with congestive heart failure.  Today he appears euvolemic.    All medications are well-tolerated.  He denies bleeding.    Feel very happy for him.  I will see him again in 6 months time and we should do an echo prior to clinic visit.    Orders Placed This Encounter   Procedures    Follow-Up with Cardiology    Echocardiogram Complete       No orders of the defined types were placed in this encounter.      Encounter Diagnoses   Name Primary?    Chronic combined systolic and diastolic heart failure (H)     S/P TAVR (transcatheter aortic valve replacement)     Chronic atrial fibrillation (H)        CURRENT MEDICATIONS:  Current Outpatient Medications   Medication Sig Dispense Refill    acetaminophen (TYLENOL) 500 MG tablet Take 1,000 mg by mouth every 8 hours as needed      albuterol (PROAIR HFA/PROVENTIL HFA/VENTOLIN HFA) 108 (90 Base) MCG/ACT inhaler INHALE 1 TO 2 PUFFS BY MOUTH EVERY 4 TO 6 HOURS AS NEEDED 18 g 2    aspirin (ASA) 81 MG EC tablet Take 81 mg by mouth 2 times daily      budesonide (PULMICORT) 0.5 MG/2ML neb solution Take  2 mLs (0.5 mg) by nebulization 2 times daily 120 mL 3    calcium carbonate 600 mg-vitamin D 400 units (CALTRATE) 600-400 MG-UNIT per tablet Take 1 tablet by mouth daily      dabigatran ANTICOAGULANT (PRADAXA) 150 MG capsule Take 1 capsule (150 mg) by mouth 2 times daily 180 capsule 3    hydrALAZINE (APRESOLINE) 25 MG tablet Take 0.5 tablets (12.5 mg) by mouth 3 times daily 135 tablet 3    ipratropium - albuterol 0.5 mg/2.5 mg/3 mL (DUONEB) 0.5-2.5 (3) MG/3ML neb solution Inhale 1 vial (3 mLs) into the lungs 4 times daily 360 mL 11    isosorbide mononitrate (IMDUR) 30 MG 24 hr tablet Take 1 tablet (30 mg) by mouth daily 90 tablet 1    magnesium chloride (MAG64) 535 (64 Mg) MG TBEC CR tablet Take 1 tablet (535 mg) by mouth 2 times daily 180 tablet 4    metoprolol tartrate (LOPRESSOR) 25 MG tablet TAKE 1 TABLET BY MOUTH TWICE A  tablet 1    montelukast (SINGULAIR) 10 MG tablet TAKE 1 TABLET (10 MG) BY MOUTH DAILY. 90 tablet 1    multivitamin, therapeutic (THERA-VIT) TABS tablet Take 1 tablet by mouth 2 times daily      nitroGLYcerin (NITROSTAT) 0.4 MG sublingual tablet For chest pain place 1 tablet under the tongue every 5 minutes for 3 doses. If symptoms persist 5 minutes after 1st dose call 911. 12 tablet 0    pantoprazole (PROTONIX) 40 MG EC tablet TAKE 1 TABLET BEFORE MEALS TWICE A DAY Strength: 40 mg 180 tablet 1    simvastatin (ZOCOR) 40 MG tablet Take 1 tablet (40 mg) by mouth At Bedtime 90 tablet 4    torsemide (DEMADEX) 5 MG tablet Patient taking 10 mg in the morning. Take additional 5mg if weight gain of 2-3 lbs in 1 day. 270 tablet 3       ALLERGIES   No Known Allergies    PAST MEDICAL HISTORY:  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    CKD (chronic kidney disease) stage 3, GFR 30-59 ml/min (H) 05/12/2011     Community acquired pneumonia 9/29/2020    Coronary atherosclerosis of unspecified type of vessel, native or graft     s/p CABG 2002    Esophageal ulcer w UGI bleed 05/24/2020    Had EGD adn caurtery 5/24/20, colonoscopy with diverticulosis then    Hyperlipidemia LDL goal < 100     Hypertension goal BP (blood pressure) < 140/90     Localized osteoarthrosis not specified whether primary or secondary, lower leg     left knee    Mild persistent asthma without complication 1/11/2016    Moderate persistent asthma     Persistent atrial fibrillation (H)     Unspecified hearing loss        PAST SURGICAL HISTORY:  Past Surgical History:   Procedure Laterality Date    CARDIAC SURGERY      COLONOSCOPY N/A 5/26/2020    Procedure: COLONOSCOPY;  Surgeon: Ignacio Fournier MD;  Location:  GI    CV ANGIOGRAM CORONARY GRAFT N/A 4/24/2020    Procedure: Angiogram Coronary Graft;  Surgeon: Addison Willard MD;  Location:  HEART CARDIAC CATH LAB    CV CORONARY ANGIOGRAM N/A 4/24/2020    Procedure: Coronary Angiogram;  Surgeon: Addison Willard MD;  Location:  HEART CARDIAC CATH LAB    CV RIGHT HEART CATH MEASUREMENTS RECORDED N/A 4/24/2020    Procedure: Right Heart Cath;  Surgeon: Addison Willard MD;  Location:  HEART CARDIAC CATH LAB    CV TRANSCATHETER AORTIC VALVE REPLACEMENT N/A 7/14/2020    Procedure: Transcatheter Aortic Valve Replacement;  Surgeon: Soraida Monet MD;  Location:  HEART CARDIAC CATH LAB    EP PACEMAKER N/A 7/15/2020    Procedure: EP Pacemaker;  Surgeon: Tommie Sauer MD;  Location:  HEART CARDIAC CATH LAB    HC ESOPHAGOSCOPY, DIAGNOSTIC  5/03    Dr. Dow, lower esophageal ring & mild gastritis    HERNIORRHAPHY INGUINAL Right 9/26/2016    Procedure: HERNIORRHAPHY INGUINAL;  Surgeon: Alexis Alexandra MD;  Location: [facility]    HERNIORRHAPHY INGUINAL Left 1/13/2023    Procedure: LEFT GROIN EXPLORATION, SEGMENTAL SMALL BOWEL RESECTION, TRANSINGUINAL REPAIR OF  INCARCERATED/STRANGULATED HERNIA;  Surgeon: Dwayne Russell MD;  Location:  OR    LAPAROSCOPIC CHOLECYSTECTOMY      for biliary sludge    REVERSE ARTHROPLASTY SHOULDER Right 2022    Procedure: RIGHT REVERSE SHOULDER ARTHROPLASTY, WITH OPEN REDUCTION INTERNAL FIXATION, WITH NO CUSTOM GUIDE;  Surgeon: Wiley Vargas MD;  Location:  OR    ZZC NONSPECIFIC PROCEDURE      Coronary artery bypass    ZZHC COLONOSCOPY THRU STOMA W BIOPSY/CAUTERY TUMOR/POLYP/LESION      Dr. Dow, Q 5 years    ZZHC COLONOSCOPY THRU STOMA W BIOPSY/CAUTERY TUMOR/POLYP/LESION      Dr. Dow, one adenomatous polyp       FAMILY HISTORY:  Family History   Problem Relation Age of Onset    C.A.D. Father     Cancer Mother         breast cancer,  of pneumonia    Cerebrovascular Disease Son     CABG Son     Family History Negative Child     Family History Negative Sister     Heart Disease Brother        SOCIAL HISTORY:  Social History     Socioeconomic History    Marital status:      Spouse name: Kailey    Number of children: 3    Years of education: None    Highest education level: None   Occupational History    Occupation: [employer]     Employer: RETIRED   Tobacco Use    Smoking status: Never    Smokeless tobacco: Never    Tobacco comments:     smoked for a week in 20's   Vaping Use    Vaping Use: Never used   Substance and Sexual Activity    Alcohol use: Not Currently     Alcohol/week: 0.0 standard drinks of alcohol    Drug use: No    Sexual activity: Yes     Partners: Female   Other Topics Concern     Service No    Blood Transfusions No    Exercise Yes    Seat Belt Yes    Self-Exams No    Parent/sibling w/ CABG, MI or angioplasty before 65F 55M? Yes   Social History Narrative    Balanced Diet - Yes    Osteoporosis Preventative measures-  Dairy servings per day: 2 to 3 servings daily    Regular Exercise -  Yes Describe walks 1.5 mile daily     Dental Exam up - YES - Date: 2 years ago     Eye Exam - YES - Date: 1 year ago    Self Testicular Exam -  No, handout given    Do you have any concerns about STD's -  No    Abuse: Current or Past (Physical, Sexual or Emotional)- No    Do you feel safe in your environment - Yes    Guns stored in the home - Yes, locked    Sunscreen used - No    Seatbelts used - Yes    Lipids - YES - Date: 3-09    Glucose -  YES - Date: 3-09    Colon Cancer Screening - Colonoscopy 3-08(date completed)    Hemoccults - UNKNOWN    PSA - YES - Date: 11-07    Digital Rectal Exam - UNKNOWN    Immunizations reviewed and up to date - Yes, td 1-2005, had shingles vaccine    5-28-09  JANEY Patel [facility]                     Social Determinants of Health     Financial Resource Strain: Low Risk  (3/19/2021)    Overall Financial Resource Strain (CARDIA)     Difficulty of Paying Living Expenses: Not hard at all   Food Insecurity: No Food Insecurity (3/19/2021)    Hunger Vital Sign     Worried About Running Out of Food in the Last Year: Never true     Ran Out of Food in the Last Year: Never true   Transportation Needs: No Transportation Needs (3/19/2021)    PRAPARE - Transportation     Lack of Transportation (Medical): No     Lack of Transportation (Non-Medical): No   Physical Activity: Insufficiently Active (10/28/2020)    Exercise Vital Sign     Days of Exercise per Week: 4 days     Minutes of Exercise per Session: 30 min   Social Connections: Unknown (3/19/2021)    Social Connection and Isolation Panel [NHANES]     Frequency of Communication with Friends and Family: More than three times a week     Marital Status:        Review of Systems:  Skin:  not assessed     Eyes:  Positive for glasses  ENT:  Positive for hearing loss  Respiratory:  Positive for dyspnea on exertion  Cardiovascular:    Positive for;edema  Gastroenterology: Negative    Genitourinary:  Negative    Musculoskeletal:  Negative    Neurologic:  Negative    Psychiatric:  Negative    Heme/Lymph/Imm:  Negative    Endocrine:   "Negative      Physical Exam:  Vitals: /80   Pulse 56   Ht 1.651 m (5' 5\")   Wt 68.7 kg (151 lb 8 oz)   SpO2 95%   BMI 25.21 kg/m      Constitutional:  in no acute distress chronically ill      Skin:  warm and dry to the touch venous stasis changes pacemaker incision in the left infraclavicular area was well-healed      Head:  normocephalic, no masses or lesions        Eyes:  pupils equal and round, conjunctivae and lids unremarkable, sclera white, no xanthalasma, EOMS intact, no nystagmus        Lymph:No Cervical lymphadenopathy present     ENT:  no pallor or cyanosis, dentition good        Neck:  JVP normal        Respiratory:  normal breath sounds, clear to auscultation, normal A-P diameter, normal symmetry, normal respiratory excursion, no use of accessory muscles         Cardiac: apical impulse not displaced;normal S1 and S2 irregular rhythm soft or inaudible A2   systolic murmur grade 1        pulses below the femoral arteries are diminished                                      GI:  abdomen soft, non-tender, BS normoactive, no mass, no HSM, no bruits        Extremities and Muscular Skeletal:  no deformities, clubbing, cyanosis, erythema observed   bilateral LE edema;1+          Neurological:  no gross motor deficits        Psych:  Alert and Oriented x 3        Recent Lab Results:  LIPID RESULTS:  Lab Results   Component Value Date    CHOL 103 09/06/2022    CHOL 119 06/01/2020    HDL 44 09/06/2022    HDL 48 06/01/2020    LDL 48 09/06/2022    LDL 54 06/01/2020    TRIG 54 09/06/2022    TRIG 87 06/01/2020    CHOLHDLRATIO 3.0 07/07/2015       LIVER ENZYME RESULTS:  Lab Results   Component Value Date    AST 34 02/26/2023    AST 25 03/15/2021    ALT 16 02/26/2023    ALT 38 03/15/2021       CBC RESULTS:  Lab Results   Component Value Date    WBC 3.5 (L) 02/26/2023    WBC 3.2 (L) 06/07/2021    RBC 4.89 02/26/2023    RBC 4.89 06/07/2021    HGB 10.5 (L) 02/26/2023    HGB 10.3 (L) 06/07/2021    HCT 34.9 (L) " 02/26/2023    HCT 33.5 (L) 06/07/2021    MCV 71 (L) 02/26/2023    MCV 69 (L) 06/07/2021    MCH 21.5 (L) 02/26/2023    MCH 21.1 (L) 06/07/2021    MCHC 30.1 (L) 02/26/2023    MCHC 30.7 (L) 06/07/2021    RDW 18.6 (H) 02/26/2023    RDW 16.9 (H) 06/07/2021     (L) 02/26/2023     (L) 06/07/2021       BMP RESULTS:  Lab Results   Component Value Date     02/26/2023     06/07/2021    POTASSIUM 4.8 02/26/2023    POTASSIUM 4.9 01/16/2023    POTASSIUM 4.3 06/07/2021    CHLORIDE 105 02/26/2023    CHLORIDE 108 01/16/2023    CHLORIDE 104 06/07/2021    CO2 29 02/26/2023    CO2 27 01/16/2023    CO2 27 06/07/2021    ANIONGAP 10 02/26/2023    ANIONGAP 5 01/16/2023    ANIONGAP 5 06/07/2021     (H) 02/26/2023    GLC 87 01/16/2023    GLC 97 06/07/2021    BUN 44.2 (H) 02/26/2023    BUN 77 (H) 01/16/2023    BUN 54 (H) 06/07/2021    CR 1.71 (H) 02/26/2023    CR 1.72 (H) 06/07/2021    GFRESTIMATED 38 (L) 02/26/2023    GFRESTIMATED 35 (L) 06/07/2021    GFRESTBLACK 41 (L) 06/07/2021    AYALA 9.5 02/26/2023    AYALA 8.9 06/07/2021        A1C RESULTS:  No results found for: A1C    INR RESULTS:  Lab Results   Component Value Date    INR 1.51 (H) 03/15/2021    INR 1.21 (H) 07/13/2020           CC  Jaxon Field MD  [id number]  [place]  [place] [address]

## 2023-07-31 NOTE — LETTER
7/31/2023    Dany Rodriguez MD, MD  [address] [facility] [address]  [place] [address]    RE: Shiraz PALOMARES Juan Jose       Dear Colleague,     I had the pleasure of seeing Shiraz Ingram in the [facility].  HPI and Plan:   Very pleasant 88-year-old gentleman who I know very well and has the following conditions and I am following up on    1. 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   2.  CKD-III; with a creatinine at baseline ~1.4.   3.  Hypertension, controlled.   4.  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  Mean gradient usually around 9 mmHg.    5.  Chronic atrial fibrillation with history of MAYA thrombus, on anticoagulation with Pradaxa.  Controlled heart rate.  6. CAD, s/p CABG. 2002 [LIMA -LAD, VG-OM and the RCA  7. Hyperlipidemia, well-controlled as of 9/2021.  8. JOSÉ ANTONIO diagnosed 2022.    Doing very well at this time.  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  Did he looks very well.  This is a very different individual from the one who used to come and see as almost every month with congestive heart failure.  Today he appears euvolemic.    All medications are well-tolerated.  He denies bleeding.    Feel very happy for him.  I will see him again in 6 months time and we should do an echo prior to clinic visit.    Orders Placed This Encounter   Procedures    Follow-Up with Cardiology    Echocardiogram Complete       No orders of the defined types were placed in this encounter.      Encounter Diagnoses   Name Primary?    Chronic combined systolic and diastolic heart failure (H)     S/P TAVR (transcatheter aortic valve replacement)     Chronic atrial fibrillation (H)        CURRENT MEDICATIONS:  Current Outpatient Medications   Medication Sig Dispense Refill    acetaminophen (TYLENOL) 500 MG tablet Take 1,000 mg by mouth every 8 hours as needed      albuterol (PROAIR HFA/PROVENTIL HFA/VENTOLIN HFA)  108 (90 Base) MCG/ACT inhaler INHALE 1 TO 2 PUFFS BY MOUTH EVERY 4 TO 6 HOURS AS NEEDED 18 g 2    aspirin (ASA) 81 MG EC tablet Take 81 mg by mouth 2 times daily      budesonide (PULMICORT) 0.5 MG/2ML neb solution Take 2 mLs (0.5 mg) by nebulization 2 times daily 120 mL 3    calcium carbonate 600 mg-vitamin D 400 units (CALTRATE) 600-400 MG-UNIT per tablet Take 1 tablet by mouth daily      dabigatran ANTICOAGULANT (PRADAXA) 150 MG capsule Take 1 capsule (150 mg) by mouth 2 times daily 180 capsule 3    hydrALAZINE (APRESOLINE) 25 MG tablet Take 0.5 tablets (12.5 mg) by mouth 3 times daily 135 tablet 3    ipratropium - albuterol 0.5 mg/2.5 mg/3 mL (DUONEB) 0.5-2.5 (3) MG/3ML neb solution Inhale 1 vial (3 mLs) into the lungs 4 times daily 360 mL 11    isosorbide mononitrate (IMDUR) 30 MG 24 hr tablet Take 1 tablet (30 mg) by mouth daily 90 tablet 1    magnesium chloride (MAG64) 535 (64 Mg) MG TBEC CR tablet Take 1 tablet (535 mg) by mouth 2 times daily 180 tablet 4    metoprolol tartrate (LOPRESSOR) 25 MG tablet TAKE 1 TABLET BY MOUTH TWICE A  tablet 1    montelukast (SINGULAIR) 10 MG tablet TAKE 1 TABLET (10 MG) BY MOUTH DAILY. 90 tablet 1    multivitamin, therapeutic (THERA-VIT) TABS tablet Take 1 tablet by mouth 2 times daily      nitroGLYcerin (NITROSTAT) 0.4 MG sublingual tablet For chest pain place 1 tablet under the tongue every 5 minutes for 3 doses. If symptoms persist 5 minutes after 1st dose call 911. 12 tablet 0    pantoprazole (PROTONIX) 40 MG EC tablet TAKE 1 TABLET BEFORE MEALS TWICE A DAY Strength: 40 mg 180 tablet 1    simvastatin (ZOCOR) 40 MG tablet Take 1 tablet (40 mg) by mouth At Bedtime 90 tablet 4    torsemide (DEMADEX) 5 MG tablet Patient taking 10 mg in the morning. Take additional 5mg if weight gain of 2-3 lbs in 1 day. 270 tablet 3       ALLERGIES   No Known Allergies    PAST MEDICAL HISTORY:  Past Medical History:   Diagnosis Date    Allergic rhinitis, cause unspecified     Anemia,  unspecified     Aortic stenosis S/P TAVR     Benign neoplasm of colon 1999    Ademonatous polyp    CHF 2nd to TAVR -- S/P Pacemaker 12/2020    CKD (chronic kidney disease) stage 3, GFR 30-59 ml/min (H) 05/12/2011    Community acquired pneumonia 9/29/2020    Coronary atherosclerosis of unspecified type of vessel, native or graft     s/p CABG 2002    Esophageal ulcer w UGI bleed 05/24/2020    Had EGD adn caurtery 5/24/20, colonoscopy with diverticulosis then    Hyperlipidemia LDL goal < 100     Hypertension goal BP (blood pressure) < 140/90     Localized osteoarthrosis not specified whether primary or secondary, lower leg     left knee    Mild persistent asthma without complication 1/11/2016    Moderate persistent asthma     Persistent atrial fibrillation (H)     Unspecified hearing loss        PAST SURGICAL HISTORY:  Past Surgical History:   Procedure Laterality Date    CARDIAC SURGERY      COLONOSCOPY N/A 5/26/2020    Procedure: COLONOSCOPY;  Surgeon: Ignacio Fournier MD;  Location:  GI    CV ANGIOGRAM CORONARY GRAFT N/A 4/24/2020    Procedure: Angiogram Coronary Graft;  Surgeon: Addison Willard MD;  Location: [facility] CARDIAC CATH LAB    CV CORONARY ANGIOGRAM N/A 4/24/2020    Procedure: Coronary Angiogram;  Surgeon: Addison Willard MD;  Location: [facility] CARDIAC CATH LAB    CV RIGHT HEART CATH MEASUREMENTS RECORDED N/A 4/24/2020    Procedure: Right Heart Cath;  Surgeon: Addison Willard MD;  Location: [facility] CARDIAC CATH LAB    CV TRANSCATHETER AORTIC VALVE REPLACEMENT N/A 7/14/2020    Procedure: Transcatheter Aortic Valve Replacement;  Surgeon: Soraida Monet MD;  Location:  HEART CARDIAC CATH LAB    EP PACEMAKER N/A 7/15/2020    Procedure: EP Pacemaker;  Surgeon: Tommie Sauer MD;  Location:  HEART CARDIAC CATH LAB    HC ESOPHAGOSCOPY, DIAGNOSTIC  5/03    Dr. Dow, lower esophageal ring & mild gastritis    HERNIORRHAPHY INGUINAL Right 9/26/2016    Procedure:  HERNIORRHAPHY INGUINAL;  Surgeon: Alexis Alexandra MD;  Location:  SD    HERNIORRHAPHY INGUINAL Left 2023    Procedure: LEFT GROIN EXPLORATION, SEGMENTAL SMALL BOWEL RESECTION, TRANSINGUINAL REPAIR OF INCARCERATED/STRANGULATED HERNIA;  Surgeon: Dwayne Russell MD;  Location:  OR    LAPAROSCOPIC CHOLECYSTECTOMY      for biliary sludge    REVERSE ARTHROPLASTY SHOULDER Right 2022    Procedure: RIGHT REVERSE SHOULDER ARTHROPLASTY, WITH OPEN REDUCTION INTERNAL FIXATION, WITH NO CUSTOM GUIDE;  Surgeon: Wiley Vargas MD;  Location:  OR    ZZC NONSPECIFIC PROCEDURE      Coronary artery bypass    ZZHC COLONOSCOPY THRU STOMA W BIOPSY/CAUTERY TUMOR/POLYP/LESION      Dr. Dow, Q 5 years    ZZHC COLONOSCOPY THRU STOMA W BIOPSY/CAUTERY TUMOR/POLYP/LESION      Dr. Dow, one adenomatous polyp       FAMILY HISTORY:  Family History   Problem Relation Age of Onset    C.A.D. Father     Cancer Mother         breast cancer,  of pneumonia    Cerebrovascular Disease Son     CABG Son     Family History Negative Child     Family History Negative Sister     Heart Disease Brother        SOCIAL HISTORY:  Social History     Socioeconomic History    Marital status:      Spouse name: Kailey    Number of children: 3    Years of education: None    Highest education level: None   Occupational History    Occupation: [employer]     Employer: RETIRED   Tobacco Use    Smoking status: Never    Smokeless tobacco: Never    Tobacco comments:     smoked for a week in 20's   Vaping Use    Vaping Use: Never used   Substance and Sexual Activity    Alcohol use: Not Currently     Alcohol/week: 0.0 standard drinks of alcohol    Drug use: No    Sexual activity: Yes     Partners: Female   Other Topics Concern     Service No    Blood Transfusions No    Exercise Yes    Seat Belt Yes    Self-Exams No    Parent/sibling w/ CABG, MI or angioplasty before 65F 55M? Yes   Social History Narrative     Balanced Diet - Yes    Osteoporosis Preventative measures-  Dairy servings per day: 2 to 3 servings daily    Regular Exercise -  Yes Describe walks 1.5 mile daily     Dental Exam up - YES - Date: 2 years ago    Eye Exam - YES - Date: 1 year ago    Self Testicular Exam -  No, handout given    Do you have any concerns about STD's -  No    Abuse: Current or Past (Physical, Sexual or Emotional)- No    Do you feel safe in your environment - Yes    Guns stored in the home - Yes, locked    Sunscreen used - No    Seatbelts used - Yes    Lipids - YES - Date: 3-09    Glucose -  YES - Date: 3-09    Colon Cancer Screening - Colonoscopy 3-08(date completed)    Hemoccults - UNKNOWN    PSA - YES - Date: 11-07    Digital Rectal Exam - UNKNOWN    Immunizations reviewed and up to date - Yes, td 1-2005, had shingles vaccine    5-28-09  [facility]                     Social Determinants of Health     Financial Resource Strain: Low Risk  (3/19/2021)    Overall Financial Resource Strain (CARDIA)     Difficulty of Paying Living Expenses: Not hard at all   Food Insecurity: No Food Insecurity (3/19/2021)    Hunger Vital Sign     Worried About Running Out of Food in the Last Year: Never true     Ran Out of Food in the Last Year: Never true   Transportation Needs: No Transportation Needs (3/19/2021)    PRAPARE - Transportation     Lack of Transportation (Medical): No     Lack of Transportation (Non-Medical): No   Physical Activity: Insufficiently Active (10/28/2020)    Exercise Vital Sign     Days of Exercise per Week: 4 days     Minutes of Exercise per Session: 30 min   Social Connections: Unknown (3/19/2021)    Social Connection and Isolation Panel [NHANES]     Frequency of Communication with Friends and Family: More than three times a week     Marital Status:        Review of Systems:  Skin:  not assessed     Eyes:  Positive for glasses  ENT:  Positive for hearing loss  Respiratory:  Positive for dyspnea on  "exertion  Cardiovascular:    Positive for;edema  Gastroenterology: Negative    Genitourinary:  Negative    Musculoskeletal:  Negative    Neurologic:  Negative    Psychiatric:  Negative    Heme/Lymph/Imm:  Negative    Endocrine:  Negative      Physical Exam:  Vitals: /80   Pulse 56   Ht 1.651 m (5' 5\")   Wt 68.7 kg (151 lb 8 oz)   SpO2 95%   BMI 25.21 kg/m      Constitutional:  in no acute distress chronically ill      Skin:  warm and dry to the touch venous stasis changes pacemaker incision in the left infraclavicular area was well-healed      Head:  normocephalic, no masses or lesions        Eyes:  pupils equal and round, conjunctivae and lids unremarkable, sclera white, no xanthalasma, EOMS intact, no nystagmus        Lymph:No Cervical lymphadenopathy present     ENT:  no pallor or cyanosis, dentition good        Neck:  JVP normal        Respiratory:  normal breath sounds, clear to auscultation, normal A-P diameter, normal symmetry, normal respiratory excursion, no use of accessory muscles         Cardiac: apical impulse not displaced;normal S1 and S2 irregular rhythm soft or inaudible A2   systolic murmur grade 1        pulses below the femoral arteries are diminished                                      GI:  abdomen soft, non-tender, BS normoactive, no mass, no HSM, no bruits        Extremities and Muscular Skeletal:  no deformities, clubbing, cyanosis, erythema observed   bilateral LE edema;1+          Neurological:  no gross motor deficits        Psych:  Alert and Oriented x 3        Recent Lab Results:  LIPID RESULTS:  Lab Results   Component Value Date    CHOL 103 09/06/2022    CHOL 119 06/01/2020    HDL 44 09/06/2022    HDL 48 06/01/2020    LDL 48 09/06/2022    LDL 54 06/01/2020    TRIG 54 09/06/2022    TRIG 87 06/01/2020    CHOLHDLRATIO 3.0 07/07/2015       LIVER ENZYME RESULTS:  Lab Results   Component Value Date    AST 34 02/26/2023    AST 25 03/15/2021    ALT 16 02/26/2023    ALT 38 " 03/15/2021       CBC RESULTS:  Lab Results   Component Value Date    WBC 3.5 (L) 02/26/2023    WBC 3.2 (L) 06/07/2021    RBC 4.89 02/26/2023    RBC 4.89 06/07/2021    HGB 10.5 (L) 02/26/2023    HGB 10.3 (L) 06/07/2021    HCT 34.9 (L) 02/26/2023    HCT 33.5 (L) 06/07/2021    MCV 71 (L) 02/26/2023    MCV 69 (L) 06/07/2021    MCH 21.5 (L) 02/26/2023    MCH 21.1 (L) 06/07/2021    MCHC 30.1 (L) 02/26/2023    MCHC 30.7 (L) 06/07/2021    RDW 18.6 (H) 02/26/2023    RDW 16.9 (H) 06/07/2021     (L) 02/26/2023     (L) 06/07/2021       BMP RESULTS:  Lab Results   Component Value Date     02/26/2023     06/07/2021    POTASSIUM 4.8 02/26/2023    POTASSIUM 4.9 01/16/2023    POTASSIUM 4.3 06/07/2021    CHLORIDE 105 02/26/2023    CHLORIDE 108 01/16/2023    CHLORIDE 104 06/07/2021    CO2 29 02/26/2023    CO2 27 01/16/2023    CO2 27 06/07/2021    ANIONGAP 10 02/26/2023    ANIONGAP 5 01/16/2023    ANIONGAP 5 06/07/2021     (H) 02/26/2023    GLC 87 01/16/2023    GLC 97 06/07/2021    BUN 44.2 (H) 02/26/2023    BUN 77 (H) 01/16/2023    BUN 54 (H) 06/07/2021    CR 1.71 (H) 02/26/2023    CR 1.72 (H) 06/07/2021    GFRESTIMATED 38 (L) 02/26/2023    GFRESTIMATED 35 (L) 06/07/2021    GFRESTBLACK 41 (L) 06/07/2021    AYALA 9.5 02/26/2023    AYALA 8.9 06/07/2021        A1C RESULTS:  No results found for: A1C    INR RESULTS:  Lab Results   Component Value Date    INR 1.51 (H) 03/15/2021    INR 1.21 (H) 07/13/2020           CC  Sandra Field MD  [address]  [place] [address]      Thank you for allowing me to participate in the care of your patient.      Sincerely,     DR SANDRA FIELD MD     [facility]

## 2023-08-03 LAB
MDC_IDC_LEAD_IMPLANT_DT: NORMAL
MDC_IDC_LEAD_LOCATION: NORMAL
MDC_IDC_LEAD_LOCATION_DETAIL_1: NORMAL
MDC_IDC_LEAD_MFG: NORMAL
MDC_IDC_LEAD_MODEL: NORMAL
MDC_IDC_LEAD_POLARITY_TYPE: NORMAL
MDC_IDC_LEAD_SERIAL: NORMAL
MDC_IDC_MSMT_BATTERY_DTM: NORMAL
MDC_IDC_MSMT_BATTERY_REMAINING_LONGEVITY: 148 MO
MDC_IDC_MSMT_BATTERY_RRT_TRIGGER: 2.62
MDC_IDC_MSMT_BATTERY_STATUS: NORMAL
MDC_IDC_MSMT_BATTERY_VOLTAGE: 3.04 V
MDC_IDC_MSMT_LEADCHNL_RV_IMPEDANCE_VALUE: 323 OHM
MDC_IDC_MSMT_LEADCHNL_RV_IMPEDANCE_VALUE: 456 OHM
MDC_IDC_MSMT_LEADCHNL_RV_PACING_THRESHOLD_AMPLITUDE: 0.75 V
MDC_IDC_MSMT_LEADCHNL_RV_PACING_THRESHOLD_PULSEWIDTH: 0.4 MS
MDC_IDC_MSMT_LEADCHNL_RV_SENSING_INTR_AMPL: 8.12 MV
MDC_IDC_MSMT_LEADCHNL_RV_SENSING_INTR_AMPL: 8.12 MV
MDC_IDC_PG_IMPLANT_DTM: NORMAL
MDC_IDC_PG_MFG: NORMAL
MDC_IDC_PG_MODEL: NORMAL
MDC_IDC_PG_SERIAL: NORMAL
MDC_IDC_PG_TYPE: NORMAL
MDC_IDC_SESS_CLINIC_NAME: NORMAL
MDC_IDC_SESS_DTM: NORMAL
MDC_IDC_SESS_TYPE: NORMAL
MDC_IDC_SET_BRADY_HYSTRATE: NORMAL
MDC_IDC_SET_BRADY_LOWRATE: 50 {BEATS}/MIN
MDC_IDC_SET_BRADY_MODE: NORMAL
MDC_IDC_SET_LEADCHNL_RV_PACING_AMPLITUDE: 2 V
MDC_IDC_SET_LEADCHNL_RV_PACING_ANODE_ELECTRODE_1: NORMAL
MDC_IDC_SET_LEADCHNL_RV_PACING_ANODE_LOCATION_1: NORMAL
MDC_IDC_SET_LEADCHNL_RV_PACING_CAPTURE_MODE: NORMAL
MDC_IDC_SET_LEADCHNL_RV_PACING_CATHODE_ELECTRODE_1: NORMAL
MDC_IDC_SET_LEADCHNL_RV_PACING_CATHODE_LOCATION_1: NORMAL
MDC_IDC_SET_LEADCHNL_RV_PACING_POLARITY: NORMAL
MDC_IDC_SET_LEADCHNL_RV_PACING_PULSEWIDTH: 0.4 MS
MDC_IDC_SET_LEADCHNL_RV_SENSING_ANODE_ELECTRODE_1: NORMAL
MDC_IDC_SET_LEADCHNL_RV_SENSING_ANODE_LOCATION_1: NORMAL
MDC_IDC_SET_LEADCHNL_RV_SENSING_CATHODE_ELECTRODE_1: NORMAL
MDC_IDC_SET_LEADCHNL_RV_SENSING_CATHODE_LOCATION_1: NORMAL
MDC_IDC_SET_LEADCHNL_RV_SENSING_POLARITY: NORMAL
MDC_IDC_SET_LEADCHNL_RV_SENSING_SENSITIVITY: 0.9 MV
MDC_IDC_SET_ZONE_DETECTION_INTERVAL: 360 MS
MDC_IDC_SET_ZONE_TYPE: NORMAL
MDC_IDC_STAT_BRADY_DTM_END: NORMAL
MDC_IDC_STAT_BRADY_DTM_START: NORMAL
MDC_IDC_STAT_BRADY_RV_PERCENT_PACED: 35.8 %
MDC_IDC_STAT_EPISODE_RECENT_COUNT: 0
MDC_IDC_STAT_EPISODE_RECENT_COUNT_DTM_END: NORMAL
MDC_IDC_STAT_EPISODE_RECENT_COUNT_DTM_START: NORMAL
MDC_IDC_STAT_EPISODE_TOTAL_COUNT: 0
MDC_IDC_STAT_EPISODE_TOTAL_COUNT: 0
MDC_IDC_STAT_EPISODE_TOTAL_COUNT: 5
MDC_IDC_STAT_EPISODE_TOTAL_COUNT_DTM_END: NORMAL
MDC_IDC_STAT_EPISODE_TOTAL_COUNT_DTM_START: NORMAL
MDC_IDC_STAT_EPISODE_TYPE: NORMAL

## 2023-08-05 NOTE — TELEPHONE ENCOUNTER
Called [facility] pharmacy.     No pa needed. It was being processed through wrong insurance. No further action needed.         Andreas Mckenna MA      Satisfactory

## 2023-08-29 DIAGNOSIS — I25.10 CORONARY ARTERY DISEASE INVOLVING NATIVE CORONARY ARTERY OF NATIVE HEART WITHOUT ANGINA PECTORIS: ICD-10-CM

## 2023-08-29 RX ORDER — DABIGATRAN ETEXILATE 150 MG/1
150 CAPSULE ORAL 2 TIMES DAILY
Qty: 180 CAPSULE | Refills: 1 | Status: ON HOLD | OUTPATIENT
Start: 2023-08-29 | End: 2024-02-11

## 2023-09-01 NOTE — PROGRESS NOTES
Radiology results on CT scan are suspicious for malignancy.  Dr. Field has reviewed report.  He will contact patient to inform him of findings.  At this time his TAVR clinic appointment scheduled for 2/13/20 has been cancelled along with carotid US.  Dr. Field will keep us updated.   Ugo Valenzuela was seen and treated in our emergency department on 9/1/2023.            no use of left hand until follow-up    Diagnosis:     Zak Newton  . He may return on this date: If you have any questions or concerns, please don't hesitate to call.       Maria L Mcrae, DO    ______________________________           _______________          _______________  Hospital Representative                              Date                                Time

## 2023-10-31 ENCOUNTER — ANCILLARY PROCEDURE (OUTPATIENT)
Dept: CARDIOLOGY | Facility: CLINIC | Age: 88
End: 2023-10-31
Attending: INTERNAL MEDICINE
Payer: COMMERCIAL

## 2023-10-31 DIAGNOSIS — I49.5 SSS (SICK SINUS SYNDROME) (H): ICD-10-CM

## 2023-10-31 DIAGNOSIS — Z95.0 CARDIAC PACEMAKER IN SITU: ICD-10-CM

## 2023-10-31 DIAGNOSIS — Z95.0 CARDIAC PACEMAKER IN SITU: Primary | ICD-10-CM

## 2023-10-31 PROCEDURE — 93294 REM INTERROG EVL PM/LDLS PM: CPT | Performed by: INTERNAL MEDICINE

## 2023-10-31 PROCEDURE — 93296 REM INTERROG EVL PM/IDS: CPT | Performed by: INTERNAL MEDICINE

## 2023-11-01 ENCOUNTER — OFFICE VISIT (OUTPATIENT)
Dept: FAMILY MEDICINE | Facility: CLINIC | Age: 88
End: 2023-11-01
Payer: COMMERCIAL

## 2023-11-01 VITALS
HEIGHT: 65 IN | OXYGEN SATURATION: 97 % | WEIGHT: 150 LBS | HEART RATE: 71 BPM | RESPIRATION RATE: 16 BRPM | DIASTOLIC BLOOD PRESSURE: 68 MMHG | SYSTOLIC BLOOD PRESSURE: 126 MMHG | TEMPERATURE: 97.4 F | BODY MASS INDEX: 24.99 KG/M2

## 2023-11-01 DIAGNOSIS — N18.31 STAGE 3A CHRONIC KIDNEY DISEASE (H): Primary | ICD-10-CM

## 2023-11-01 DIAGNOSIS — Z87.19 HISTORY OF ESOPHAGEAL ULCER: ICD-10-CM

## 2023-11-01 DIAGNOSIS — I10 HYPERTENSION GOAL BP (BLOOD PRESSURE) < 140/90: ICD-10-CM

## 2023-11-01 DIAGNOSIS — I25.10 CORONARY ARTERY DISEASE INVOLVING NATIVE CORONARY ARTERY OF NATIVE HEART WITHOUT ANGINA PECTORIS: ICD-10-CM

## 2023-11-01 DIAGNOSIS — I50.33 ACUTE ON CHRONIC DIASTOLIC CONGESTIVE HEART FAILURE (H): ICD-10-CM

## 2023-11-01 DIAGNOSIS — K22.11 ULCER OF ESOPHAGUS WITH BLEEDING: ICD-10-CM

## 2023-11-01 DIAGNOSIS — J45.40 MODERATE PERSISTENT ASTHMA WITHOUT COMPLICATION: ICD-10-CM

## 2023-11-01 LAB
ALBUMIN SERPL BCG-MCNC: 4.2 G/DL (ref 3.5–5.2)
ALP SERPL-CCNC: 69 U/L (ref 40–129)
ALT SERPL W P-5'-P-CCNC: 13 U/L (ref 0–70)
ANION GAP SERPL CALCULATED.3IONS-SCNC: 9 MMOL/L (ref 7–15)
AST SERPL W P-5'-P-CCNC: 26 U/L (ref 0–45)
BILIRUB SERPL-MCNC: 0.6 MG/DL
BUN SERPL-MCNC: 81 MG/DL (ref 8–23)
CALCIUM SERPL-MCNC: 10.1 MG/DL (ref 8.8–10.2)
CHLORIDE SERPL-SCNC: 100 MMOL/L (ref 98–107)
CHOLEST SERPL-MCNC: 126 MG/DL
CREAT SERPL-MCNC: 1.96 MG/DL (ref 0.67–1.17)
CREAT UR-MCNC: 21.3 MG/DL
DEPRECATED HCO3 PLAS-SCNC: 26 MMOL/L (ref 22–29)
EGFRCR SERPLBLD CKD-EPI 2021: 32 ML/MIN/1.73M2
ERYTHROCYTE [DISTWIDTH] IN BLOOD BY AUTOMATED COUNT: 18.3 % (ref 10–15)
FERRITIN SERPL-MCNC: 307 NG/ML (ref 31–409)
GLUCOSE SERPL-MCNC: 95 MG/DL (ref 70–99)
HCT VFR BLD AUTO: 36.7 % (ref 40–53)
HDLC SERPL-MCNC: 43 MG/DL
HGB BLD-MCNC: 11.1 G/DL (ref 13.3–17.7)
HOLD SPECIMEN: NORMAL
IRON BINDING CAPACITY (ROCHE): 333 UG/DL (ref 240–430)
IRON SATN MFR SERPL: 30 % (ref 15–46)
IRON SERPL-MCNC: 99 UG/DL (ref 61–157)
LDLC SERPL CALC-MCNC: 63 MG/DL
MCH RBC QN AUTO: 20.1 PG (ref 26.5–33)
MCHC RBC AUTO-ENTMCNC: 30.2 G/DL (ref 31.5–36.5)
MCV RBC AUTO: 67 FL (ref 78–100)
MICROALBUMIN UR-MCNC: <12 MG/L
MICROALBUMIN/CREAT UR: NORMAL MG/G{CREAT}
NONHDLC SERPL-MCNC: 83 MG/DL
PLATELET # BLD AUTO: 131 10E3/UL (ref 150–450)
POTASSIUM SERPL-SCNC: 5.6 MMOL/L (ref 3.4–5.3)
PROT SERPL-MCNC: 7.9 G/DL (ref 6.4–8.3)
RBC # BLD AUTO: 5.51 10E6/UL (ref 4.4–5.9)
SODIUM SERPL-SCNC: 135 MMOL/L (ref 135–145)
TRIGL SERPL-MCNC: 98 MG/DL
WBC # BLD AUTO: 4.1 10E3/UL (ref 4–11)

## 2023-11-01 PROCEDURE — 36415 COLL VENOUS BLD VENIPUNCTURE: CPT | Performed by: FAMILY MEDICINE

## 2023-11-01 PROCEDURE — 99214 OFFICE O/P EST MOD 30 MIN: CPT | Mod: 25 | Performed by: FAMILY MEDICINE

## 2023-11-01 PROCEDURE — 82728 ASSAY OF FERRITIN: CPT | Performed by: FAMILY MEDICINE

## 2023-11-01 PROCEDURE — 83550 IRON BINDING TEST: CPT | Performed by: FAMILY MEDICINE

## 2023-11-01 PROCEDURE — 85027 COMPLETE CBC AUTOMATED: CPT | Performed by: FAMILY MEDICINE

## 2023-11-01 PROCEDURE — 82570 ASSAY OF URINE CREATININE: CPT | Performed by: FAMILY MEDICINE

## 2023-11-01 PROCEDURE — G0008 ADMIN INFLUENZA VIRUS VAC: HCPCS | Performed by: FAMILY MEDICINE

## 2023-11-01 PROCEDURE — 83540 ASSAY OF IRON: CPT | Performed by: FAMILY MEDICINE

## 2023-11-01 PROCEDURE — 90662 IIV NO PRSV INCREASED AG IM: CPT | Performed by: FAMILY MEDICINE

## 2023-11-01 PROCEDURE — 82043 UR ALBUMIN QUANTITATIVE: CPT | Performed by: FAMILY MEDICINE

## 2023-11-01 PROCEDURE — 80061 LIPID PANEL: CPT | Performed by: FAMILY MEDICINE

## 2023-11-01 PROCEDURE — 80053 COMPREHEN METABOLIC PANEL: CPT | Performed by: FAMILY MEDICINE

## 2023-11-01 RX ORDER — RESPIRATORY SYNCYTIAL VIRUS VACCINE 120MCG/0.5
0.5 KIT INTRAMUSCULAR ONCE
Qty: 1 EACH | Refills: 0 | Status: CANCELLED | OUTPATIENT
Start: 2023-11-01 | End: 2023-11-01

## 2023-11-01 RX ORDER — MONTELUKAST SODIUM 10 MG/1
1 TABLET ORAL DAILY
Qty: 90 TABLET | Refills: 1 | Status: SHIPPED | OUTPATIENT
Start: 2023-11-01 | End: 2024-05-01

## 2023-11-01 RX ORDER — PANTOPRAZOLE SODIUM 40 MG/1
TABLET, DELAYED RELEASE ORAL
Qty: 180 TABLET | Refills: 1 | Status: SHIPPED | OUTPATIENT
Start: 2023-11-01 | End: 2024-05-01

## 2023-11-01 RX ORDER — METOPROLOL TARTRATE 25 MG/1
25 TABLET, FILM COATED ORAL 2 TIMES DAILY
Qty: 180 TABLET | Refills: 1 | Status: SHIPPED | OUTPATIENT
Start: 2023-11-01 | End: 2024-05-01

## 2023-11-01 NOTE — PROGRESS NOTES
Prior to immunization administration, verified patients identity using patient s name and date of birth. Please see Immunization Activity for additional information.     Screening Questionnaire for Adult Immunization    Are you sick today?   No   Do you have allergies to medications, food, a vaccine component or latex?   No   Have you ever had a serious reaction after receiving a vaccination?   No   Do you have a long-term health problem with heart, lung, kidney, or metabolic disease (e.g., diabetes), asthma, a blood disorder, no spleen, complement component deficiency, a cochlear implant, or a spinal fluid leak?  Are you on long-term aspirin therapy?   Yes, CKD   Do you have cancer, leukemia, HIV/AIDS, or any other immune system problem?   No   Do you have a parent, brother, or sister with an immune system problem?   No   In the past 3 months, have you taken medications that affect  your immune system, such as prednisone, other steroids, or anticancer drugs; drugs for the treatment of rheumatoid arthritis, Crohn s disease, or psoriasis; or have you had radiation treatments?   No   Have you had a seizure, or a brain or other nervous system problem?   No   During the past year, have you received a transfusion of blood or blood    products, or been given immune (gamma) globulin or antiviral drug?   No   For women: Are you pregnant or is there a chance you could become       pregnant during the next month?   No   Have you received any vaccinations in the past 4 weeks?   No     Immunization questionnaire was positive for at least one answer.  Notified Dr. Rodriguez.      Patient instructed to remain in clinic for 15 minutes afterwards, and to report any adverse reactions.     Screening performed by Wilbert Fulton RN on 11/1/2023 at 11:08 AM.

## 2023-11-01 NOTE — COMMUNITY RESOURCES LIST (ENGLISH)
11/01/2023   [facility] Media Radar  N/A  For questions about this resource list or additional care needs, please contact your primary care clinic or care manager.  Phone: [phone]   Email: N/A   Address: [facility] [place] [address]   Hours: N/A        Hotlines and Helplines       Hotline - Housing crisis  1  [facility] (Main Office) - Emergency Services Distance: 3.53 miles      Phone/Virtual   [address] [place] [address]  Language: English  Hours: Mon - Sun Open 24 Hours   Phone: (857) 186-9337 Email: info@Fwd: [facility].org Website: http://[employer].org     2  [facility] Distance: 4.34 miles      Phone/Virtual   [address] [place] [address]  Language: English  Hours: Mon - Sun Open 24 Hours   Phone: (234) 894-4168 Email: communications@[facility].org Website: https://[facility].org/oursaviourshousing/          Housing       Coordinated Entry access point  3  [facility] [place] ([facility] - Housing Services Distance: 4.31 miles      In-Person   [address] [place] [address]  Language: English  Hours: Mon - Fri 9:00 AM - 5:00 PM  Fees: Free   Phone: (120) 348-6941 Email: housing@[facility].org Website: http://www.[facility].org/housing     4  [facility] - Adult [facility] Distance: 5.39 miles      In-Person   [address] [place] [address]  Language: English  Hours: Mon - Sat 10:00 PM - 5:00 PM  Fees: Free   Phone: (727) 185-5014 Email: info@[facility].org Website: http://www.[facility].org/     Drop-in center or day shelter  5  [facility] Distance: 4.73 miles      In-Person   [address] [place] [address]  Language: English  Hours: Mon - Fri 12:00 PM - 3:00 PM  Fees: Free   Phone: (372) 591-9389 Email: [employer] Website: http://[employer].org/     6  [facility] Distance: 4.75  miles      In-Person   [address] [place] [address]  Language: English, Jordanian, Costa Rican  Hours: Mon - Sat 7:00 AM - 3:00 PM  Fees: Free, Self Pay   Phone: (388) 761-9379 Email: info@[employer] Website: https://www.[employer].org/locations/opportunity-center/     Housing search assistance  7  [facility] Office of Multicultural Services Distance: 3.19 miles      Phone/Virtual   [address] [place] [address]  Language: American Sign Language, French, Malay, English, Guyanese, Phillip, Oromo, Niuean, Jordanian, Costa Rican, Swahili, Lebanese, Namibian  Hours: Mon - Tue 9:00 AM - 4:00 PM , Wed 10:00 AM - 5:00 PM , Thu - Fri 9:00 AM - 4:00 PM  Fees: Free   Phone: (694) 962-4035 Email: oms@[facility] Website: http://www.[facility]/human-services/multi-cultural-services     8  [facility] - Online Housing Search Assistance Distance: 3.96 miles      Phone/Virtual   [address] [place] [address]  Language: English  Hours: Mon - Sun Open 24 Hours  Fees: Free   Phone: (620) 808-2676 Email: hermilo@[employer] Website: https://[employer].PayStand/     Shelter for families  9  [facility] Distance: 20.93 miles      In-Person   [address] [place] [address]  Language: English  Hours: Mon - Fri 3:00 PM - 9:00 AM , Sat - Sun Open 24 Hours  Fees: Free   Phone: (422) 655-4176 Ext.1 Website: https://www.[facility].org/2020/07/03/emergency-family-shelter/     Shelter for individuals  10  [facility] Distance: 4.34 miles      In-Person   [address] [place] [address]  Language: English  Hours: Mon - Sun Open 24 Hours  Fees: Free   Phone: (857) 645-3646 Email: communications@[facility].org Website: https://[facility]-[place].org/oursaviourshousing/     11  [facility] Distance: 5.83 miles      In-Person   [address] [place] [address]  Language: English  Hours: Mon - Fri  4:00 PM - 9:00 AM  Fees: Free   Phone: (310) 540-3257 Email: patricio@[facility].[employer].org Website: https://[facility].[employer].org/northern/[facility]/          Important Numbers & Websites       Emergency Services   911  [facility] Services   311  Poison Control   (604) 727-9383  Suicide Prevention Lifeline   (362) 710-2284 (TALK)  Child Abuse Hotline   (603) 949-8684 (4-A-Child)  Sexual Assault Hotline   (230) 839-3185 (HOPE)  National Runaway Safeline   (675) 395-8834 (RUNAWAY)  All-Options Talkline   (947) 651-1151  Substance Abuse Referral   (577) 101-3691 (HELP)

## 2023-11-01 NOTE — PROGRESS NOTES
Assessment & Plan     Stage 3a chronic kidney disease (H)  Continue to avoid nephrotoxic agents.  Continue to monitor.  - Albumin Random Urine Quantitative with Creat Ratio; Future  - Comprehensive metabolic panel (BMP + Alb, Alk Phos, ALT, AST, Total. Bili, TP); Future  - Albumin Random Urine Quantitative with Creat Ratio  - Comprehensive metabolic panel (BMP + Alb, Alk Phos, ALT, AST, Total. Bili, TP)    Coronary artery disease involving native coronary artery of native heart without angina pectoris  Seeing cardiology.  Monitor labs today.  Reviewed recent cardiology notes.  - Lipid panel reflex to direct LDL Non-fasting; Future  - Comprehensive metabolic panel (BMP + Alb, Alk Phos, ALT, AST, Total. Bili, TP); Future  - Lipid panel reflex to direct LDL Non-fasting  - Comprehensive metabolic panel (BMP + Alb, Alk Phos, ALT, AST, Total. Bili, TP)    History of esophageal ulcer  On chronic PPI.  Continue same dose of PPI for now.  - CBC with Platelets and Reflex to Iron Studies; Future  - CBC with Platelets and Reflex to Iron Studies  - Iron & Iron Binding Capacity  - Ferritin    Hypertension goal BP (blood pressure) < 140/90  Blood pressure under good control.  Initially blood pressure was on higher side.  Repeat blood pressure improved.  - metoprolol tartrate (LOPRESSOR) 25 MG tablet; Take 1 tablet (25 mg) by mouth 2 times daily    Moderate persistent asthma without complication  Patient is tolerating current medication without any major side effects of concerns and current dose seems reasonable too.  Current medication regime is effective. Continue current treatment without any changes.   - montelukast (SINGULAIR) 10 MG tablet; Take 1 tablet (10 mg) by mouth daily    Ulcer of esophagus with bleeding  Patient is tolerating current medication without any major side effects of concerns and current dose seems reasonable too.  Current medication regime is effective. Continue current treatment without any changes.   -  "pantoprazole (PROTONIX) 40 MG EC tablet; TAKE 1 TABLET BEFORE MEALS TWICE A DAY Strength: 40 mg    Acute on chronic diastolic congestive heart failure (H)  Seeing cardiologist.  Treatment as per cardiology.  On torsemide.                 Dany Rodriguez MD, MD  [facility]    Javon Weldon is a 88 year old, presenting for the following health issues:  Follow Up (6 month follow-up)      11/1/2023    10:34 AM   Additional Questions   Roomed by Florence   Accompanied by Self     History of Present Illness       Reason for visit:  Follow up    He eats 2-3 servings of fruits and vegetables daily.He consumes 1 sweetened beverage(s) daily.He exercises with enough effort to increase his heart rate 20 to 29 minutes per day.  He exercises with enough effort to increase his heart rate 7 days per week.   He is taking medications regularly.     Going deer hunting this weekend.     Here with sign .     Feels overall breathing is better. Walking with his dog helps. Breathing is better with mask on.     Bp high. This morning 123/80 and pulse was 80s.   Recent cardiology visit and bp was fine.     History of esophageal ulcer. No concern of bleeding, blood in stool or black stool. No dizziness.     Review of Systems         Objective    BP (!) 150/71 (BP Location: Right arm, Patient Position: Sitting, Cuff Size: Adult Regular)   Pulse 71   Temp 97.4  F (36.3  C) (Temporal)   Resp 16   Ht 1.66 m (5' 5.35\")   Wt 68 kg (150 lb)   SpO2 97%   BMI 24.69 kg/m    Body mass index is 24.69 kg/m .  Physical Exam   Lungs clear.  Heart RRR.                      "

## 2023-11-03 ENCOUNTER — TELEPHONE (OUTPATIENT)
Dept: NURSING | Facility: CLINIC | Age: 88
End: 2023-11-03
Payer: COMMERCIAL

## 2023-11-03 ENCOUNTER — TELEPHONE (OUTPATIENT)
Dept: FAMILY MEDICINE | Facility: CLINIC | Age: 88
End: 2023-11-03
Payer: COMMERCIAL

## 2023-11-03 DIAGNOSIS — E87.5 HYPERKALEMIA: Primary | ICD-10-CM

## 2023-11-03 LAB
MDC_IDC_LEAD_CONNECTION_STATUS: NORMAL
MDC_IDC_LEAD_IMPLANT_DT: NORMAL
MDC_IDC_LEAD_LOCATION: NORMAL
MDC_IDC_LEAD_LOCATION_DETAIL_1: NORMAL
MDC_IDC_LEAD_MFG: NORMAL
MDC_IDC_LEAD_MODEL: NORMAL
MDC_IDC_LEAD_POLARITY_TYPE: NORMAL
MDC_IDC_LEAD_SERIAL: NORMAL
MDC_IDC_MSMT_BATTERY_DTM: NORMAL
MDC_IDC_MSMT_BATTERY_REMAINING_LONGEVITY: 144 MO
MDC_IDC_MSMT_BATTERY_RRT_TRIGGER: 2.62
MDC_IDC_MSMT_BATTERY_STATUS: NORMAL
MDC_IDC_MSMT_BATTERY_VOLTAGE: 3.04 V
MDC_IDC_MSMT_LEADCHNL_RV_IMPEDANCE_VALUE: 323 OHM
MDC_IDC_MSMT_LEADCHNL_RV_IMPEDANCE_VALUE: 418 OHM
MDC_IDC_MSMT_LEADCHNL_RV_PACING_THRESHOLD_AMPLITUDE: 0.62 V
MDC_IDC_MSMT_LEADCHNL_RV_PACING_THRESHOLD_PULSEWIDTH: 0.4 MS
MDC_IDC_MSMT_LEADCHNL_RV_SENSING_INTR_AMPL: 8.75 MV
MDC_IDC_MSMT_LEADCHNL_RV_SENSING_INTR_AMPL: 8.75 MV
MDC_IDC_PG_IMPLANT_DTM: NORMAL
MDC_IDC_PG_MFG: NORMAL
MDC_IDC_PG_MODEL: NORMAL
MDC_IDC_PG_SERIAL: NORMAL
MDC_IDC_PG_TYPE: NORMAL
MDC_IDC_SESS_CLINIC_NAME: NORMAL
MDC_IDC_SESS_DTM: NORMAL
MDC_IDC_SESS_TYPE: NORMAL
MDC_IDC_SET_BRADY_HYSTRATE: NORMAL
MDC_IDC_SET_BRADY_LOWRATE: 50 {BEATS}/MIN
MDC_IDC_SET_BRADY_MODE: NORMAL
MDC_IDC_SET_LEADCHNL_RV_PACING_AMPLITUDE: 2 V
MDC_IDC_SET_LEADCHNL_RV_PACING_ANODE_ELECTRODE_1: NORMAL
MDC_IDC_SET_LEADCHNL_RV_PACING_ANODE_LOCATION_1: NORMAL
MDC_IDC_SET_LEADCHNL_RV_PACING_CAPTURE_MODE: NORMAL
MDC_IDC_SET_LEADCHNL_RV_PACING_CATHODE_ELECTRODE_1: NORMAL
MDC_IDC_SET_LEADCHNL_RV_PACING_CATHODE_LOCATION_1: NORMAL
MDC_IDC_SET_LEADCHNL_RV_PACING_POLARITY: NORMAL
MDC_IDC_SET_LEADCHNL_RV_PACING_PULSEWIDTH: 0.4 MS
MDC_IDC_SET_LEADCHNL_RV_SENSING_ANODE_ELECTRODE_1: NORMAL
MDC_IDC_SET_LEADCHNL_RV_SENSING_ANODE_LOCATION_1: NORMAL
MDC_IDC_SET_LEADCHNL_RV_SENSING_CATHODE_ELECTRODE_1: NORMAL
MDC_IDC_SET_LEADCHNL_RV_SENSING_CATHODE_LOCATION_1: NORMAL
MDC_IDC_SET_LEADCHNL_RV_SENSING_POLARITY: NORMAL
MDC_IDC_SET_LEADCHNL_RV_SENSING_SENSITIVITY: 0.9 MV
MDC_IDC_SET_ZONE_DETECTION_INTERVAL: 360 MS
MDC_IDC_SET_ZONE_STATUS: NORMAL
MDC_IDC_SET_ZONE_TYPE: NORMAL
MDC_IDC_SET_ZONE_VENDOR_TYPE: NORMAL
MDC_IDC_STAT_BRADY_DTM_END: NORMAL
MDC_IDC_STAT_BRADY_DTM_START: NORMAL
MDC_IDC_STAT_BRADY_RV_PERCENT_PACED: 43.23 %
MDC_IDC_STAT_EPISODE_RECENT_COUNT: 0
MDC_IDC_STAT_EPISODE_RECENT_COUNT_DTM_END: NORMAL
MDC_IDC_STAT_EPISODE_RECENT_COUNT_DTM_START: NORMAL
MDC_IDC_STAT_EPISODE_TOTAL_COUNT: 0
MDC_IDC_STAT_EPISODE_TOTAL_COUNT: 0
MDC_IDC_STAT_EPISODE_TOTAL_COUNT: 5
MDC_IDC_STAT_EPISODE_TOTAL_COUNT_DTM_END: NORMAL
MDC_IDC_STAT_EPISODE_TOTAL_COUNT_DTM_START: NORMAL
MDC_IDC_STAT_EPISODE_TYPE: NORMAL

## 2023-11-03 NOTE — TELEPHONE ENCOUNTER
MARCOS for son, Willi, to call  clinic triage for this result message.  Please call us this afternoon.  JOSE Fregoso

## 2023-11-03 NOTE — TELEPHONE ENCOUNTER
Patient is deaf. Need to call son.  His potassium is high.   Needs recheck.   Lab only is fine.   Prefer if he can come in Monday morning.   Rest of the labs are stable.

## 2023-11-03 NOTE — TELEPHONE ENCOUNTER
Pt's son calling stating he missed a call from us. Below information given to son who stated pt is out of town and will not be able to have his labs rechecked until Tuesday. He was transferred to scheduling to assist with an appointment.       Anjali Cortes RN  [facility] Nurse Advisor   11/3/2023  5:33 PM

## 2023-11-06 NOTE — TELEPHONE ENCOUNTER
Attempt x 2    Left message for patient to call clinic and talk with a triage nurse.    Maria Alejandra iLcea, RN, BSN, PHN  [facility]  [phone]

## 2023-11-06 NOTE — TELEPHONE ENCOUNTER
Dr. Rodriguez-Please review and may close encounter if in agreement with plan.    Return call received from patient's son, Forrest:  Reviewed message per Dr. Jennifer Clayton verbalized understanding and stated earliest patient can come in for lab appt is 11/7/23, patient is coming back into town today, and has lab appt scheduled for patient on 11/7/23 at 1345.    Informed Forrest writer will update Dr. Rodriguez.    Forrest verbalized understanding and in agreement with plan.    Thank you!  KATHLEEN VelezN, RN-BC  [facility]

## 2023-11-07 ENCOUNTER — LAB (OUTPATIENT)
Dept: LAB | Facility: CLINIC | Age: 88
End: 2023-11-07
Payer: COMMERCIAL

## 2023-11-07 DIAGNOSIS — E87.5 HYPERKALEMIA: ICD-10-CM

## 2023-11-07 DIAGNOSIS — Z13.29 SCREENING FOR THYROID DISORDER: ICD-10-CM

## 2023-11-07 LAB
ANION GAP SERPL CALCULATED.3IONS-SCNC: 10 MMOL/L (ref 7–15)
BUN SERPL-MCNC: 71 MG/DL (ref 8–23)
CALCIUM SERPL-MCNC: 9.8 MG/DL (ref 8.8–10.2)
CHLORIDE SERPL-SCNC: 104 MMOL/L (ref 98–107)
CREAT SERPL-MCNC: 1.86 MG/DL (ref 0.67–1.17)
DEPRECATED HCO3 PLAS-SCNC: 24 MMOL/L (ref 22–29)
EGFRCR SERPLBLD CKD-EPI 2021: 34 ML/MIN/1.73M2
GLUCOSE SERPL-MCNC: 94 MG/DL (ref 70–99)
POTASSIUM SERPL-SCNC: 5.6 MMOL/L (ref 3.4–5.3)
SODIUM SERPL-SCNC: 138 MMOL/L (ref 135–145)
T4 FREE SERPL-MCNC: 1.24 NG/DL (ref 0.9–1.7)
TSH SERPL DL<=0.005 MIU/L-ACNC: 4.64 UIU/ML (ref 0.3–4.2)

## 2023-11-07 PROCEDURE — 84443 ASSAY THYROID STIM HORMONE: CPT

## 2023-11-07 PROCEDURE — 80048 BASIC METABOLIC PNL TOTAL CA: CPT

## 2023-11-07 PROCEDURE — 36415 COLL VENOUS BLD VENIPUNCTURE: CPT

## 2023-11-07 PROCEDURE — 84439 ASSAY OF FREE THYROXINE: CPT

## 2023-11-08 ENCOUNTER — TELEPHONE (OUTPATIENT)
Dept: FAMILY MEDICINE | Facility: CLINIC | Age: 88
End: 2023-11-08
Payer: COMMERCIAL

## 2023-11-08 ENCOUNTER — OFFICE VISIT (OUTPATIENT)
Dept: URGENT CARE | Facility: URGENT CARE | Age: 88
End: 2023-11-08
Payer: COMMERCIAL

## 2023-11-08 VITALS
DIASTOLIC BLOOD PRESSURE: 62 MMHG | RESPIRATION RATE: 16 BRPM | TEMPERATURE: 97.4 F | BODY MASS INDEX: 24.99 KG/M2 | WEIGHT: 150 LBS | HEART RATE: 60 BPM | HEIGHT: 65 IN | SYSTOLIC BLOOD PRESSURE: 124 MMHG | OXYGEN SATURATION: 96 %

## 2023-11-08 DIAGNOSIS — E87.5 HYPERKALEMIA: Primary | ICD-10-CM

## 2023-11-08 DIAGNOSIS — N18.32 STAGE 3B CHRONIC KIDNEY DISEASE (H): ICD-10-CM

## 2023-11-08 DIAGNOSIS — I48.20 CHRONIC ATRIAL FIBRILLATION (H): ICD-10-CM

## 2023-11-08 PROCEDURE — 99214 OFFICE O/P EST MOD 30 MIN: CPT | Performed by: NURSE PRACTITIONER

## 2023-11-08 PROCEDURE — 36415 COLL VENOUS BLD VENIPUNCTURE: CPT | Performed by: NURSE PRACTITIONER

## 2023-11-08 PROCEDURE — 93000 ELECTROCARDIOGRAM COMPLETE: CPT | Performed by: NURSE PRACTITIONER

## 2023-11-08 PROCEDURE — 80048 BASIC METABOLIC PNL TOTAL CA: CPT | Performed by: NURSE PRACTITIONER

## 2023-11-08 NOTE — TELEPHONE ENCOUNTER
Writer called patient's cell number and  assisted writer to leave a message to call back and ask to speak with an available triage nurse.    Consent to communicate on file with sonWilli:  Left message to call back and ask to speak with an available triage nurse.    KATHLEEN VelezN, RN-[facility]

## 2023-11-08 NOTE — Clinical Note
Harinder Rodriguez, please see my urgent care note.  Shiraz remains completely asymptomatic with his hyperkalemia.  EKG with peaked T waves.  We will repeat a BMP today. Advised he needs a daily repeat BMP until this normalizes, typically urgent care does not see chronic disease management patients for follow-up, nor would we change his diuretic, start kayexalate. Not sure about availability for next day with primary care.  Advised him on symptoms that would warrant the ER.  I suspect this is from his CKD dehydration diet, instructed him on low K diet and pushing fluids. Thanks.

## 2023-11-08 NOTE — TELEPHONE ENCOUNTER
Patient's son, Willi, called back.    Writer explained reason for RN call to Willi and that patient did return clinic's call and is currently in Urgent Care.    Willi verbalized understanding and stated he can be to [facility] in 10 minutes if needed.  Willi asked writer to pass along that information to Urgent Care staff.    Writer updated AMBER Sandoval, Medical Assistant, of message from Willi.    MARY Velez, RN-[facility]

## 2023-11-08 NOTE — TELEPHONE ENCOUNTER
Patient returned call. Writer relayed message from Dr. Rodriguez.     Dr. Rodriguez message --  Patient has persistent hyperkalemia.  Due to his history of CKD 3, A-fib it would be very important he follows up.  Can be seen in urgent care if he is clinically feeling fine.  If he is having palpitation, muscle twitching he needs to go to emergency room.     Patient stated he would head on over to the urgent care here at [place].     Santa Reza, BSN RN  [facility]

## 2023-11-08 NOTE — TELEPHONE ENCOUNTER
Patient has persistent hyperkalemia.  Due to his history of CKD 3, A-fib it would be very important he follows up.  Can be seen in urgent care if he is clinically feeling fine.  If he is having palpitation, muscle twitching he needs to go to emergency room.

## 2023-11-08 NOTE — PROGRESS NOTES
Chief Complaint   Patient presents with    Urgent Care     Nurse advised to come in for his heart.      SUBJECTIVE:  Shiraz Ingram is a 88 year old male who presents to the clinic today with hyperkalemia potassium of 5.6 yesterday and a week ago.  He is completely asymptomatic without chest pain palpitations muscle twitch weakness nausea.  He has chronic A-fib.  Took 10 mg torsemide today, was taken off of Lasix in the hospital previously for RD.  Has CKD with improved GFR 34 and creatinine 1.86 at yesterday's check.  Patient relays eating a lot of potatoes lately.  He has mild 1+ pitting edema bilaterally.  Declines any recent falls injuries bleeding.  He was instructed to come to urgent care for evaluation of his hyperkalemia.     used for this visit.    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    CKD (chronic kidney disease) stage 3, GFR 30-59 ml/min (H) 05/12/2011    Community acquired pneumonia 9/29/2020    Coronary atherosclerosis of unspecified type of vessel, native or graft     s/p CABG 2002    Esophageal ulcer w UGI bleed 05/24/2020    Had EGD adn caurtery 5/24/20, colonoscopy with diverticulosis then    Hyperlipidemia LDL goal < 100     Hypertension goal BP (blood pressure) < 140/90     Localized osteoarthrosis not specified whether primary or secondary, lower leg     left knee    Mild persistent asthma without complication 1/11/2016    Moderate persistent asthma     Persistent atrial fibrillation (H)     Unspecified hearing loss      acetaminophen (TYLENOL) 500 MG tablet, Take 1,000 mg by mouth every 8 hours as needed  albuterol (PROAIR HFA/PROVENTIL HFA/VENTOLIN HFA) 108 (90 Base) MCG/ACT inhaler, INHALE 1 TO 2 PUFFS BY MOUTH EVERY 4 TO 6 HOURS AS NEEDED  aspirin (ASA) 81 MG EC tablet, Take 81 mg by  mouth 2 times daily  budesonide (PULMICORT) 0.5 MG/2ML neb solution, Take 2 mLs (0.5 mg) by nebulization 2 times daily  calcium carbonate 600 mg-vitamin D 400 units (CALTRATE) 600-400 MG-UNIT per tablet, Take 1 tablet by mouth daily  dabigatran ANTICOAGULANT (PRADAXA) 150 MG capsule, Take 1 capsule (150 mg) by mouth 2 times daily  hydrALAZINE (APRESOLINE) 25 MG tablet, Take 0.5 tablets (12.5 mg) by mouth 3 times daily  ipratropium - albuterol 0.5 mg/2.5 mg/3 mL (DUONEB) 0.5-2.5 (3) MG/3ML neb solution, Inhale 1 vial (3 mLs) into the lungs 4 times daily  isosorbide mononitrate (IMDUR) 30 MG 24 hr tablet, Take 1 tablet (30 mg) by mouth daily  magnesium chloride (MAG64) 535 (64 Mg) MG TBEC CR tablet, Take 1 tablet (535 mg) by mouth 2 times daily  metoprolol tartrate (LOPRESSOR) 25 MG tablet, Take 1 tablet (25 mg) by mouth 2 times daily  montelukast (SINGULAIR) 10 MG tablet, Take 1 tablet (10 mg) by mouth daily  multivitamin, therapeutic (THERA-VIT) TABS tablet, Take 1 tablet by mouth 2 times daily  nitroGLYcerin (NITROSTAT) 0.4 MG sublingual tablet, For chest pain place 1 tablet under the tongue every 5 minutes for 3 doses. If symptoms persist 5 minutes after 1st dose call 911.  pantoprazole (PROTONIX) 40 MG EC tablet, TAKE 1 TABLET BEFORE MEALS TWICE A DAY Strength: 40 mg  simvastatin (ZOCOR) 40 MG tablet, Take 1 tablet (40 mg) by mouth At Bedtime  torsemide (DEMADEX) 5 MG tablet, Patient taking 10 mg in the morning. Take additional 5mg if weight gain of 2-3 lbs in 1 day.    No current facility-administered medications on file prior to visit.    Social History     Tobacco Use    Smoking status: Never    Smokeless tobacco: Never    Tobacco comments:     smoked for a week in 20's   Substance Use Topics    Alcohol use: Not Currently     Alcohol/week: 0.0 standard drinks of alcohol     No Known Allergies    Review of Systems  All systems negative except those listed above in HPI.    EXAM:   /62   Pulse 60   Temp  "97.4  F (36.3  C) (Temporal)   Resp 16   Ht 1.651 m (5' 5\")   Wt 68 kg (150 lb)   SpO2 96%   BMI 24.96 kg/m      Physical Exam  Vitals reviewed.   Constitutional:       Appearance: Normal appearance.   HENT:      Head: Normocephalic and atraumatic.      Nose: Nose normal.      Mouth/Throat:      Mouth: Mucous membranes are moist.      Pharynx: Oropharynx is clear.   Eyes:      Extraocular Movements: Extraocular movements intact.      Conjunctiva/sclera: Conjunctivae normal.      Pupils: Pupils are equal, round, and reactive to light.   Cardiovascular:      Rate and Rhythm: Normal rate and regular rhythm.      Pulses: Normal pulses.   Pulmonary:      Effort: Pulmonary effort is normal. No respiratory distress.      Breath sounds: Normal breath sounds. No stridor. No wheezing, rhonchi or rales.   Chest:      Chest wall: No tenderness.   Musculoskeletal:         General: Normal range of motion.      Cervical back: Normal range of motion and neck supple.      Right lower leg: Edema present.      Left lower leg: Edema present.      Comments: 1+ pitting edema bilaterally.  There is no muscle twitch present.   Skin:     General: Skin is warm and dry.      Findings: No rash.   Neurological:      General: No focal deficit present.      Mental Status: He is alert and oriented to person, place, and time.   Psychiatric:         Mood and Affect: Mood normal.         Behavior: Behavior normal.       EKG done in clinic read by me as peaked T waves consistent with hyperkalemia, no ST elevation [place].    ASSESSMENT:    ICD-10-CM    1. Hyperkalemia  E87.5 Basic metabolic panel  (Ca, Cl, CO2, Creat, Gluc, K, Na, BUN)      2. Chronic atrial fibrillation (H)  I48.20 EKG 12-lead complete w/read - Clinics        PLAN:    Reassuring that patient is asymptomatic  Discussed hyperkalemia is likely due to his CKD diet dehydration  Advised him on low potassium diet and pushing mostly water  He will need to have a repeat BMP daily until " this normalizes  I reached out to his PCP regarding rechecks as typically in urgent care we do not provide follow-up on chronic disease management  Discussed the importance of potassium in regards to cardiac events  If rising potassium needs to go to the ER for hospital management (possible diuretic change, kayexalate, cards, nephrology consult, GI bleed workup and obs  Instructed patient if any symptoms of chest pain palpitations muscle twitch dizziness he needs to present to the ER immediately    Follow up with primary care provider with any problems, questions or concerns or if symptoms worsen or fail to improve. Patient agreed to plan and verbalized understanding.    Juliette Albright, MARIA L-[facility]

## 2023-11-09 ENCOUNTER — LAB (OUTPATIENT)
Dept: LAB | Facility: CLINIC | Age: 88
End: 2023-11-09
Payer: COMMERCIAL

## 2023-11-09 ENCOUNTER — TELEPHONE (OUTPATIENT)
Dept: FAMILY MEDICINE | Facility: CLINIC | Age: 88
End: 2023-11-09

## 2023-11-09 DIAGNOSIS — E87.5 HYPERKALEMIA: ICD-10-CM

## 2023-11-09 DIAGNOSIS — E87.5 HYPERKALEMIA: Primary | ICD-10-CM

## 2023-11-09 LAB
ANION GAP SERPL CALCULATED.3IONS-SCNC: 11 MMOL/L (ref 7–15)
ANION GAP SERPL CALCULATED.3IONS-SCNC: 7 MMOL/L (ref 7–15)
BUN SERPL-MCNC: 62.6 MG/DL (ref 8–23)
BUN SERPL-MCNC: 72.1 MG/DL (ref 8–23)
CALCIUM SERPL-MCNC: 9.2 MG/DL (ref 8.8–10.2)
CALCIUM SERPL-MCNC: 9.6 MG/DL (ref 8.8–10.2)
CHLORIDE SERPL-SCNC: 100 MMOL/L (ref 98–107)
CHLORIDE SERPL-SCNC: 104 MMOL/L (ref 98–107)
CREAT SERPL-MCNC: 1.89 MG/DL (ref 0.67–1.17)
CREAT SERPL-MCNC: 1.9 MG/DL (ref 0.67–1.17)
DEPRECATED HCO3 PLAS-SCNC: 24 MMOL/L (ref 22–29)
DEPRECATED HCO3 PLAS-SCNC: 29 MMOL/L (ref 22–29)
EGFRCR SERPLBLD CKD-EPI 2021: 34 ML/MIN/1.73M2
EGFRCR SERPLBLD CKD-EPI 2021: 34 ML/MIN/1.73M2
GLUCOSE SERPL-MCNC: 103 MG/DL (ref 70–99)
GLUCOSE SERPL-MCNC: 87 MG/DL (ref 70–99)
POTASSIUM SERPL-SCNC: 5.1 MMOL/L (ref 3.4–5.3)
POTASSIUM SERPL-SCNC: 5.2 MMOL/L (ref 3.4–5.3)
SODIUM SERPL-SCNC: 136 MMOL/L (ref 135–145)
SODIUM SERPL-SCNC: 139 MMOL/L (ref 135–145)

## 2023-11-09 PROCEDURE — 36415 COLL VENOUS BLD VENIPUNCTURE: CPT

## 2023-11-09 PROCEDURE — 80048 BASIC METABOLIC PNL TOTAL CA: CPT

## 2023-11-09 NOTE — TELEPHONE ENCOUNTER
----- Message from Kaylynn Pham MD sent at 11/9/2023  4:26 PM CST -----  Please call to give him lab result, thanks!    Great news, your potassium is now normal!  Please do return to clinic tomorrow to recheck this lab, and if still normal tomorrow, you can stop getting testing.    Sincerely,  Dr. Kaylynn Pham MD  11/9/2023

## 2023-11-09 NOTE — PROGRESS NOTES
ASSESSMENT / PLAN:  (E87.5) Hyperkalemia  (primary encounter diagnosis)  Comment: stat daily labs ordered as below  Plan: Basic metabolic panel  (Ca, Cl, CO2, Creat,         Gluc, K, Na, BUN)          Potassium   Date Value Ref Range Status   11/07/2023 5.6 (H) 3.4 - 5.3 mmol/L Final   11/01/2023 5.6 (H) 3.4 - 5.3 mmol/L Final   02/26/2023 4.8 3.4 - 5.3 mmol/L Final     Comment:     Specimen slightly hemolyzed, potassium may be falsely elevated.   01/16/2023 4.9 3.4 - 5.3 mmol/L Final   01/15/2023 4.8 3.4 - 5.3 mmol/L Final   01/14/2023 4.9 3.4 - 5.3 mmol/L Final   06/07/2021 4.3 3.4 - 5.3 mmol/L Final   03/22/2021 5.0 3.4 - 5.3 mmol/L Final   03/18/2021 4.1 3.4 - 5.3 mmol/L Final     Sincerely,  Dr. Kaylynn Pham MD  11/9/2023

## 2023-11-09 NOTE — RESULT ENCOUNTER NOTE
Please call to give him lab result, thanks!    Great news, your potassium is now normal!  Please do return to clinic tomorrow to recheck this lab, and if still normal tomorrow, you can stop getting testing.    Sincerely,  Dr. Kaylynn Pham MD  11/9/2023

## 2023-11-09 NOTE — TELEPHONE ENCOUNTER
Writer called patient with : reviewed message per Dr. Pham.    Patient verbalized understanding and in agreement with plan.    MARY Velez, RN-BC  [facility]

## 2023-11-10 ENCOUNTER — LAB (OUTPATIENT)
Dept: LAB | Facility: CLINIC | Age: 88
End: 2023-11-10
Payer: COMMERCIAL

## 2023-11-10 ENCOUNTER — TELEPHONE (OUTPATIENT)
Dept: FAMILY MEDICINE | Facility: CLINIC | Age: 88
End: 2023-11-10

## 2023-11-10 DIAGNOSIS — E87.5 HYPERKALEMIA: ICD-10-CM

## 2023-11-10 LAB
ANION GAP SERPL CALCULATED.3IONS-SCNC: 5 MMOL/L (ref 7–15)
BUN SERPL-MCNC: 57.5 MG/DL (ref 8–23)
CALCIUM SERPL-MCNC: 9.7 MG/DL (ref 8.8–10.2)
CHLORIDE SERPL-SCNC: 100 MMOL/L (ref 98–107)
CREAT SERPL-MCNC: 1.99 MG/DL (ref 0.67–1.17)
DEPRECATED HCO3 PLAS-SCNC: 30 MMOL/L (ref 22–29)
EGFRCR SERPLBLD CKD-EPI 2021: 32 ML/MIN/1.73M2
GLUCOSE SERPL-MCNC: 86 MG/DL (ref 70–99)
POTASSIUM SERPL-SCNC: 4.9 MMOL/L (ref 3.4–5.3)
SODIUM SERPL-SCNC: 135 MMOL/L (ref 135–145)

## 2023-11-10 PROCEDURE — 80048 BASIC METABOLIC PNL TOTAL CA: CPT

## 2023-11-10 PROCEDURE — 36415 COLL VENOUS BLD VENIPUNCTURE: CPT

## 2023-11-10 NOTE — TELEPHONE ENCOUNTER
Spoke to patient via . ID # [id number].    Patient called for test results.   Read result note to patient from Dr. Pham:   Your glucose level, electrolyte level and kidney function, measured with a test called the basic metabolic panel, is normal, which is great news!     If you have any questions, please contact the clinic or schedule an appointment with me, thank you!      Dr. Rodriguez --   Patient would like to know if he needs to schedule a follow-up with you regarding lab results.   If so, are you ok with a same day or provider authorized visit.     Patient would like a phone call back on Monday after 3:30 pm.     KATHLEEN ZengN RN  [facility]

## 2023-11-10 NOTE — RESULT ENCOUNTER NOTE
Your glucose level, electrolyte level and kidney function, measured with a test called the basic metabolic panel, is normal, which is great news!     If you have any questions, please contact the clinic or schedule an appointment with me, thank you!      Sincerely,    JOSE MIGUEL GARCIA MD   11/10/2023

## 2023-11-11 ENCOUNTER — LAB (OUTPATIENT)
Dept: LAB | Facility: CLINIC | Age: 88
End: 2023-11-11
Payer: COMMERCIAL

## 2023-11-11 DIAGNOSIS — E87.5 HYPERKALEMIA: ICD-10-CM

## 2023-11-11 LAB
ANION GAP SERPL CALCULATED.3IONS-SCNC: 6 MMOL/L (ref 7–15)
BUN SERPL-MCNC: 52.4 MG/DL (ref 8–23)
CALCIUM SERPL-MCNC: 9.3 MG/DL (ref 8.8–10.2)
CHLORIDE SERPL-SCNC: 99 MMOL/L (ref 98–107)
CREAT SERPL-MCNC: 1.97 MG/DL (ref 0.67–1.17)
DEPRECATED HCO3 PLAS-SCNC: 29 MMOL/L (ref 22–29)
EGFRCR SERPLBLD CKD-EPI 2021: 32 ML/MIN/1.73M2
GLUCOSE SERPL-MCNC: 83 MG/DL (ref 70–99)
POTASSIUM SERPL-SCNC: 4.7 MMOL/L (ref 3.4–5.3)
SODIUM SERPL-SCNC: 134 MMOL/L (ref 135–145)

## 2023-11-11 PROCEDURE — 36415 COLL VENOUS BLD VENIPUNCTURE: CPT

## 2023-11-11 PROCEDURE — 80048 BASIC METABOLIC PNL TOTAL CA: CPT

## 2023-11-12 ENCOUNTER — LAB (OUTPATIENT)
Dept: LAB | Facility: CLINIC | Age: 88
End: 2023-11-12
Payer: COMMERCIAL

## 2023-11-12 DIAGNOSIS — E87.5 HYPERKALEMIA: ICD-10-CM

## 2023-11-12 PROCEDURE — 36415 COLL VENOUS BLD VENIPUNCTURE: CPT

## 2023-11-12 PROCEDURE — 80048 BASIC METABOLIC PNL TOTAL CA: CPT

## 2023-11-12 NOTE — LETTER
November 14, 2023      Shiraz Ingram  [address]  [place] [facility] [place] [id number]        Dear Albert!  It was a pleasure to see you in clinic!  Thank you for getting labs done.     The testing of your blood sugar and electrolytes was normal.  Your potassium and sodium levels are now normal. You do not need additional testing at this time.    Kidney function is assessed by blood work that measures creatinine and calculates estimated GFR (glomerular filtration rate).  By current criteria you do have stage 3 chronic kidney disease as your eGFR is < 60.  This is not something that is urgent as your value has not changed much with time.    However, it does mean will monitor your labs (urine and blood tests) every 6-12 months and we will keep trying to make sure your blood pressure and cholesterol are at goal to keep your kidneys as healthy as possible.     If you have any questions, please contact the clinic or schedule an appointment with me, thank you!      Sincerely,  Dr. Kaylynn Pham MD  11/14/2023      Resulted Orders   Basic metabolic panel   Result Value Ref Range    Sodium 137 135 - 145 mmol/L      Comment:      Reference intervals for this test were updated on 09/26/2023 to more accurately reflect our healthy population. There may be differences in the flagging of prior results with similar values performed with this method. Interpretation of those prior results can be made in the context of the updated reference intervals.     Potassium 5.3 3.4 - 5.3 mmol/L    Chloride 102 98 - 107 mmol/L    Carbon Dioxide (CO2) 24 22 - 29 mmol/L    Anion Gap 11 7 - 15 mmol/L    Urea Nitrogen 50.1 (H) 8.0 - 23.0 mg/dL    Creatinine 1.98 (H) 0.67 - 1.17 mg/dL    GFR Estimate 32 (L) >60 mL/min/1.73m2    Calcium 8.8 8.8 - 10.2 mg/dL    Glucose 98 70 - 99 mg/dL       If you have any questions or concerns, please call the clinic at the number listed above.       Sincerely,      Kaylynn Pham MD

## 2023-11-13 LAB
ANION GAP SERPL CALCULATED.3IONS-SCNC: 11 MMOL/L (ref 7–15)
BUN SERPL-MCNC: 50.1 MG/DL (ref 8–23)
CALCIUM SERPL-MCNC: 8.8 MG/DL (ref 8.8–10.2)
CHLORIDE SERPL-SCNC: 102 MMOL/L (ref 98–107)
CREAT SERPL-MCNC: 1.98 MG/DL (ref 0.67–1.17)
DEPRECATED HCO3 PLAS-SCNC: 24 MMOL/L (ref 22–29)
EGFRCR SERPLBLD CKD-EPI 2021: 32 ML/MIN/1.73M2
GLUCOSE SERPL-MCNC: 98 MG/DL (ref 70–99)
POTASSIUM SERPL-SCNC: 5.3 MMOL/L (ref 3.4–5.3)
SODIUM SERPL-SCNC: 137 MMOL/L (ref 135–145)

## 2023-11-14 NOTE — RESULT ENCOUNTER NOTE
Hello!  It was a pleasure to see you in clinic!  Thank you for getting labs done.     The testing of your blood sugar and electrolytes was normal.  Your potassium and sodium levels are now normal. You do not need additional testing at this time.    Kidney function is assessed by blood work that measures creatinine and calculates estimated GFR (glomerular filtration rate).  By current criteria you do have stage 3 chronic kidney disease as your eGFR is < 60.  This is not something that is urgent as your value has not changed much with time.    However, it does mean will monitor your labs (urine and blood tests) every 6-12 months and we will keep trying to make sure your blood pressure and cholesterol are at goal to keep your kidneys as healthy as possible.     If you have any questions, please contact the clinic or schedule an appointment with me, thank you!      Sincerely,  Dr. Kaylynn Pham MD  11/14/2023

## 2023-11-14 NOTE — TELEPHONE ENCOUNTER
Agree with previous message - no further testing or follow up needed at this point. Routine follow up with me

## 2023-11-14 NOTE — TELEPHONE ENCOUNTER
Writer called pt with the help of a . Provider message relayed to pt.     Patient verbalizes understanding, agrees with plan and has no further questions.    Pt asked if he still needs to go to his lab appt on 11/20/2023? Chart review, pt made that lab appt under the impression that he needed to come back to redo some labs. Reiterated provider's message. Lab appt cancelled per pt request.    No further action needed.    Closing encounter.    KATHLEEN CordobaN, RN   [facility]

## 2023-12-01 NOTE — PROGRESS NOTES
[facility]    Medicine Progress Note - Hospitalist Service       Date of Admission:  7/14/2020  Assessment & Plan   Shiraz Ingram is a 85 year old male admitted on 7/14/2020. He is admitted following TAVR for severe aortic stenosis.     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 Evaluated by EP 5/2020 and his metoprolol was d/c'd. Developed complete heart block during TAVR procedure. Temporary pacemaker placed for procedure and converted to fixed lead PPM.  - Hold AV fox blocking agents  - EP consulted, appreciate Dr. Sauer's assistance  - Temporary pacemaker in place, Plan for PPM today     Atrial Fibrillation  Left Atrial Appendage Thrombus  PTA Metoprolol had been held by cardiology due to bradycardia. Anticoagulated with Pradaxa prior to admission.   - Metoprolol on hold in the setting of CHB  - Heparin gtt while awaiting permanent PPM  - Telemetry     HFpEF  Most recent Echo with EF 50-55%. Currently appears euvolemic. No on diuretics PTA  - Monitor I&Os and daily weights  - No peripheral edema or rales on exam    Intake/Output Summary (Last 24 hours) at 7/15/2020 1122  Last data filed at 7/15/2020 0644  Gross per 24 hour   Intake 2000 ml   Output 825 ml   Net 1175 ml       Vitals:    07/14/20 1121 07/15/20 0531   Weight: 71.4 kg (157 lb 5 oz) 77.9 kg (171 lb 11.2 oz)        CAD with h/o remote CABG  Recent coronary angiogram demonstrated severe three-vessel coronary disease, moderate to severe lesion of the right PDA branch of the SVG- right PDA- right PL Y graft, widely patent LIMA to the LAD, SVG to the OM [PTA regimen includes metoprolol 50 mg bid, lisinopril 20 mg/d, simvastatin 40 mg/d and ASA 81 mg/d]  - Continue PTA statin and ASA  - Continue to hold ACE and BB, resume as indicated per Cardiology     Essential  HTN  Historically on Metoprolol 50 mg/d and lisinpril 20 mg/d. Both of these medications have been hold per patient. BP elevated post-op  - IV prn hydralazine. Monitor  - Consider reinitiation of ACE/BB pending BP and PPM placement**     Recent Hospitalization for Community Acquired pneumonia 7/3-7/5  Moderate Persistent Asthma  COVID-19 negative.   Treated IV zosyn and transitioned to 5 days oral Levaquin on discharge which he completed as outpatient. Denies recent URI symptoms or SOB  - Continue PTA Pulmicort and Duonebs     H/o GI Bleed 5/2020  Recent baseline Hgb 10-11  - Hgb 11.5--9.7  - NPO for PPM   - Converted PTA Protonix to IV  - Monitor Hgb with heparin gtt  - Hgb stable currently    Recent Labs   Lab 07/15/20  0527 07/14/20  1650 07/13/20  1420 07/08/20  1205   HGB 10.4* 9.7* 11.5* 11.2*        CKD, III  Baseline Cr 1.5.  - At baseline  - Avoid nephrotoxins  - Monitor     Hearling Loss: Uses .    Diet: Advance Diet as Tolerated: Regular Diet Adult  NPO per Anesthesia Guidelines for Procedure/Surgery Except for: Meds  NPO for Medical/Clinical Reasons Except for: Other; Specify: May take medications with a drink of water prior to Electrophysiology study    DVT Prophylaxis: Heparin gtt  Pak Catheter: not present  Code Status: Full Code      Disposition Plan   Expected discharge: 1-2 days, recommended to prior living arrangement once adequate pain management/ tolerating PO medications and pacemaker implanted, and cleared by cardiology.  Entered: Elif Meyer PA-C 07/15/2020, 11:22 AM       The patient's care was discussed with the Attending Physician, Dr. Groves and Patient.    Elif FrancoisKimBON Grady  Hospitalist Service  [facility]    ______________________________________________________________________    Interval History   Seen and examined. Feeling well. No chest pain, palpitations, lightheadedness, dizziness, or SOB. No N/V. Elected  for PPM later today, hopeful for discharge tomorrow. Conducted interview with .     Data reviewed today: I reviewed all medications, new labs and imaging results over the last 24 hours. I personally reviewed no images or EKG's today.    Physical Exam   Vital Signs: Temp: 99.4  F (37.4  C) Temp src: Oral BP: 132/75 Pulse: 81 Heart Rate: 74 Resp: 11 SpO2: 96 % O2 Device: Nasal cannula Oxygen Delivery: 2 LPM  Weight: 171 lbs 11.2 oz    General: Awake, alert, elderly gentleman who appears stated age. Looks comfortable laying in bed. No acute distress.  assisting.  HEENT: Normocephalic, atraumatic. Extraocular movements intact.   Respiratory: Clear to auscultation bilaterally, no rales, wheezing, or rhonchi to anterolateral fields.  Cardiovascular: Irregular rate and rhythm, +S1 and S2, no murmur auscultated. No peripheral edema.   Gastrointestinal: Soft, non-tender, non-distended. Bowel sounds present.  Skin: Warm, dry. No obvious rashes or lesions on exposed skin. Dorsalis pedis pulses palpable bilaterally.  Musculoskeletal: No joint swelling, erythema or tenderness. Moves all extremities equally.  Neurologic: AAO x3. Cranial nerves 2-12 grossly intact, normal strength and sensation.  Psychiatric: Appropriate mood and affect. No obvious anxiety or depression.    Data   Recent Labs   Lab 07/15/20  0527 07/14/20  1650 07/14/20  1147 07/13/20  1420   WBC 6.3 3.4*  --  3.6*   HGB 10.4* 9.7*  --  11.5*   MCV 74* 72*  --  72*    131*  --  168   INR  --   --   --  1.21*    141  --  142   POTASSIUM 4.8 4.1 4.4 4.2   CHLORIDE 110* 110*  --  109   CO2 26 27  --  26   BUN 23 22  --  26   CR 1.41* 1.36*  --  1.40*   ANIONGAP 7 4  --  7   AYALA 9.0 8.4*  --  9.4   GLC 95 93  --  98   ALBUMIN  --  3.0*  --  3.6   PROTTOTAL  --  6.2*  --  7.8   BILITOTAL  --  0.5  --  0.6   ALKPHOS  --  60  --  82   ALT  --  21  --  28   AST  --  20  --  23     Recent  Results (from the past 24 hour(s))   Transesophageal Echocardiogram    Narrative    [id number]  [id number]  [id number]  [id number]^JERSON^RANI^NORMA           [facility]  Echocardiography Laboratory  [address] [place], [place] [address]        Name: SRIKANTH OBANDO  MRN: [id number]  : 1934  Study Date: 2020 01:25 PM  Age: 85 yrs  Gender: Male  Patient Location: [facility]  Reason For Study: Severe aortic stenosis  Ordering Physician: RANI ARTHUR  Referring Physician: Dany Rodriguez  Performed By: Glenn Costa RDCS     BSA: 1.8 m2  Height: 68 in  Weight: 157 lb  HR: 72  _____________________________________________________________________________  __        Procedure  Complete ALINE Adult. ALINE Probe serial #[id number] was used during the procedure.  The heart rate, respiratory rate and response to care were monitored  throughout the procedure with the assistance of the nurse. 3D image  acquisition, reconstruction, and real-time interpretation was performed.  _____________________________________________________________________________  __        Interpretation Summary     PROCEDURE  Intra-procedural ALINE for transfemoral transcatheter aortic valve replacement  with 29-mm Medtronic valve for severe aortic stenosis.     PRE-PROCEDURAL FINDINGS:  1. Severe, calcific aortic stenosis with trace aortic regurgitation.  2. Normal left ventricular size and systolic function. LVEF 55-60%.  3. Trace mitral and tricuspid valve regurgitation.  4. The left atrium and left atrial appendage are severely dilated. Spontaneous  echo contrast in LA and appendage without visible thrombus.     POST-PROCEDURAL SURVEY:  1. 29-mm Medtronic valve successfully deployed.  2. Valve is well seated with good leaflet motion. Mean gradient 2 mmHg. Trace  to mild amanda-prosthetic regurgitation (12 to 1 o'clock position).  3. Trace mitral regurgitation.  4. No pericardial effusion.      _____________________________________________________________________________  __        ALINE  Probe insertion by anesthesia staff. The transesophageal probe was passed  without difficulty. There were no complications associated with this  procedure.     Left Ventricle  The left ventricle is normal in size. Left ventricular systolic function is  normal. The visual ejection fraction is estimated at 55-60%. No regional wall  motion abnormalities noted. There is no thrombus seen in the left ventricle.     Right Ventricle  The right ventricle is normal size. The right ventricular systolic function is  normal. There is no mass or thrombus in the right ventricle.     Atria  The left atrium is severely dilated. Spontaneous contrast in left atrium. No  left atrial mass or thrombus visualized. The right atrium is mildly dilated.  No evidence of right atrial mass/thrombus. Intact atrial septum. A contrast  injection (Bubble Study) was performed that was negative for flow across the  interatrial septum. There is no color Doppler evidence of an atrial shunt. The  left atrial appendage was well visualized and free of thrombus. Spontaneous  contrast in left atrial appendage.        Mitral Valve  The mitral valve leaflets appear normal. There is no evidence of stenosis,  fluttering, or prolapse. There is trace mitral regurgitation. There is no  mitral valve stenosis.     Tricuspid Valve  Normal tricuspid valve. There is trace tricuspid regurgitation.     Aortic Valve  The aortic valve is trileaflet. There is trace aortic regurgitation. Severe  valvular aortic stenosis.     Pulmonic Valve  Normal pulmonic valve. There is trace pulmonic valvular regurgitation.     Vessels  The aortic root is normal size. Normal size ascending aorta. Normal pulmonary  venous drainage.        Pericardial/Pleural  There is no pericardial effusion.     Rhythm  The rhythm was atrial  fibrillation.  _____________________________________________________________________________  __           Doppler Measurements & Calculations  Ao V2 max: 116.8 cm/sec  Ao max P.0 mmHg  Ao V2 mean: 72.6 cm/sec  Ao mean P.4 mmHg  Ao V2 VTI: 25.3 cm  LV V1 max P.5 mmHg  LV V1 max: 105.7 cm/sec  LV V1 VTI: 21.3 cm  AV Vishnu Ratio (DI): 0.90           _____________________________________________________________________________  __           Report approved by: Dr Kesha Moreira 2020 03:52 PM      Cardiac Catheterization    Narrative    Successful implantation of a 29 mm Medtronic Evolut Pro Plus valve.  Complete heart block that will require Electrophysiology consultation for   a permanent pacemaker.      Medications     NaCl       HEParin 850 Units/hr (07/15/20 1012)     - MEDICATION INSTRUCTIONS -         budesonide  0.5 mg Nebulization BID     calcium carbonate 600 mg-vitamin D 400 units  1 tablet Oral Daily     ceFAZolin  2 g Intravenous Pre-Op/Pre-procedure x 1 dose     ferrous sulfate  325 mg Oral Daily     ipratropium - albuterol 0.5 mg/2.5 mg/3 mL  3 mL Nebulization 4x daily     montelukast  10 mg Oral Daily     multivitamin, therapeutic  1 tablet Oral QPM     pantoprazole (PROTONIX) IV  40 mg Intravenous BID     simvastatin  40 mg Oral At Bedtime     sodium chloride (PF)  3 mL Intracatheter Q8H        Continue home toprol 100mg BID  Hold home losartan 25mg/d in the setting of YVETTE

## 2023-12-04 DIAGNOSIS — I50.9 ACUTE ON CHRONIC CONGESTIVE HEART FAILURE, UNSPECIFIED HEART FAILURE TYPE (H): ICD-10-CM

## 2023-12-04 RX ORDER — ISOSORBIDE MONONITRATE 30 MG/1
30 TABLET, EXTENDED RELEASE ORAL DAILY
Qty: 90 TABLET | Refills: 1 | Status: ON HOLD | OUTPATIENT
Start: 2023-12-04 | End: 2024-02-11

## 2023-12-25 ENCOUNTER — HOSPITAL ENCOUNTER (EMERGENCY)
Facility: CLINIC | Age: 88
Discharge: HOME | End: 2023-12-25
Attending: EMERGENCY MEDICINE | Admitting: EMERGENCY MEDICINE
Payer: COMMERCIAL

## 2023-12-25 ENCOUNTER — APPOINTMENT (OUTPATIENT)
Dept: GENERAL RADIOLOGY | Facility: CLINIC | Age: 88
End: 2023-12-25
Attending: EMERGENCY MEDICINE
Payer: COMMERCIAL

## 2023-12-25 VITALS
WEIGHT: 150 LBS | DIASTOLIC BLOOD PRESSURE: 74 MMHG | TEMPERATURE: 98 F | SYSTOLIC BLOOD PRESSURE: 186 MMHG | BODY MASS INDEX: 24.96 KG/M2 | OXYGEN SATURATION: 92 % | HEART RATE: 49 BPM | RESPIRATION RATE: 13 BRPM

## 2023-12-25 DIAGNOSIS — N18.32 STAGE 3B CHRONIC KIDNEY DISEASE (H): ICD-10-CM

## 2023-12-25 DIAGNOSIS — R07.9 CHEST PAIN, UNSPECIFIED TYPE: ICD-10-CM

## 2023-12-25 DIAGNOSIS — R79.89 ELEVATED TROPONIN: ICD-10-CM

## 2023-12-25 LAB
ANION GAP SERPL CALCULATED.3IONS-SCNC: 12 MMOL/L (ref 7–15)
ATRIAL RATE - MUSE: 55 BPM
BASOPHILS # BLD AUTO: 0 10E3/UL (ref 0–0.2)
BASOPHILS NFR BLD AUTO: 1 %
BUN SERPL-MCNC: 54 MG/DL (ref 8–23)
CALCIUM SERPL-MCNC: 9.2 MG/DL (ref 8.8–10.2)
CHLORIDE SERPL-SCNC: 106 MMOL/L (ref 98–107)
CREAT SERPL-MCNC: 1.91 MG/DL (ref 0.67–1.17)
DEPRECATED HCO3 PLAS-SCNC: 25 MMOL/L (ref 22–29)
DIASTOLIC BLOOD PRESSURE - MUSE: NORMAL MMHG
EGFRCR SERPLBLD CKD-EPI 2021: 33 ML/MIN/1.73M2
EOSINOPHIL # BLD AUTO: 0.1 10E3/UL (ref 0–0.7)
EOSINOPHIL NFR BLD AUTO: 2 %
ERYTHROCYTE [DISTWIDTH] IN BLOOD BY AUTOMATED COUNT: 17.6 % (ref 10–15)
GLUCOSE SERPL-MCNC: 111 MG/DL (ref 70–99)
HCT VFR BLD AUTO: 35.6 % (ref 40–53)
HGB BLD-MCNC: 10.7 G/DL (ref 13.3–17.7)
HOLD SPECIMEN: NORMAL
HOLD SPECIMEN: NORMAL
IMM GRANULOCYTES # BLD: 0 10E3/UL
IMM GRANULOCYTES NFR BLD: 0 %
INTERPRETATION ECG - MUSE: NORMAL
LYMPHOCYTES # BLD AUTO: 0.6 10E3/UL (ref 0.8–5.3)
LYMPHOCYTES NFR BLD AUTO: 17 %
MCH RBC QN AUTO: 19.9 PG (ref 26.5–33)
MCHC RBC AUTO-ENTMCNC: 30.1 G/DL (ref 31.5–36.5)
MCV RBC AUTO: 66 FL (ref 78–100)
MONOCYTES # BLD AUTO: 0.3 10E3/UL (ref 0–1.3)
MONOCYTES NFR BLD AUTO: 10 %
NEUTROPHILS # BLD AUTO: 2.3 10E3/UL (ref 1.6–8.3)
NEUTROPHILS NFR BLD AUTO: 70 %
NRBC # BLD AUTO: 0 10E3/UL
NRBC BLD AUTO-RTO: 1 /100
NT-PROBNP SERPL-MCNC: 4099 PG/ML (ref 0–1800)
P AXIS - MUSE: 108 DEGREES
PLATELET # BLD AUTO: 96 10E3/UL (ref 150–450)
POTASSIUM SERPL-SCNC: 4.7 MMOL/L (ref 3.4–5.3)
PR INTERVAL - MUSE: NORMAL MS
QRS DURATION - MUSE: 180 MS
QT - MUSE: 526 MS
QTC - MUSE: 479 MS
R AXIS - MUSE: -61 DEGREES
RBC # BLD AUTO: 5.37 10E6/UL (ref 4.4–5.9)
SODIUM SERPL-SCNC: 143 MMOL/L (ref 135–145)
SYSTOLIC BLOOD PRESSURE - MUSE: NORMAL MMHG
T AXIS - MUSE: 101 DEGREES
TROPONIN T SERPL HS-MCNC: 55 NG/L
TROPONIN T SERPL HS-MCNC: 59 NG/L
TROPONIN T SERPL HS-MCNC: 65 NG/L
VENTRICULAR RATE- MUSE: 50 BPM
WBC # BLD AUTO: 3.3 10E3/UL (ref 4–11)

## 2023-12-25 PROCEDURE — 36415 COLL VENOUS BLD VENIPUNCTURE: CPT | Performed by: EMERGENCY MEDICINE

## 2023-12-25 PROCEDURE — 99285 EMERGENCY DEPT VISIT HI MDM: CPT | Mod: 25

## 2023-12-25 PROCEDURE — 80048 BASIC METABOLIC PNL TOTAL CA: CPT | Performed by: EMERGENCY MEDICINE

## 2023-12-25 PROCEDURE — 84484 ASSAY OF TROPONIN QUANT: CPT | Performed by: EMERGENCY MEDICINE

## 2023-12-25 PROCEDURE — 250N000013 HC RX MED GY IP 250 OP 250 PS 637: Performed by: EMERGENCY MEDICINE

## 2023-12-25 PROCEDURE — 36415 COLL VENOUS BLD VENIPUNCTURE: CPT | Performed by: STUDENT IN AN ORGANIZED HEALTH CARE EDUCATION/TRAINING PROGRAM

## 2023-12-25 PROCEDURE — 85025 COMPLETE CBC W/AUTO DIFF WBC: CPT | Performed by: EMERGENCY MEDICINE

## 2023-12-25 PROCEDURE — 71046 X-RAY EXAM CHEST 2 VIEWS: CPT

## 2023-12-25 PROCEDURE — 83880 ASSAY OF NATRIURETIC PEPTIDE: CPT | Performed by: EMERGENCY MEDICINE

## 2023-12-25 PROCEDURE — 93005 ELECTROCARDIOGRAM TRACING: CPT

## 2023-12-25 PROCEDURE — 84484 ASSAY OF TROPONIN QUANT: CPT | Performed by: STUDENT IN AN ORGANIZED HEALTH CARE EDUCATION/TRAINING PROGRAM

## 2023-12-25 RX ORDER — ASPIRIN 81 MG/1
324 TABLET, CHEWABLE ORAL ONCE
Status: COMPLETED | OUTPATIENT
Start: 2023-12-25 | End: 2023-12-25

## 2023-12-25 RX ADMIN — ASPIRIN 81 MG CHEWABLE TABLET 324 MG: 81 TABLET CHEWABLE at 09:24

## 2023-12-25 ASSESSMENT — ACTIVITIES OF DAILY LIVING (ADL)
ADLS_ACUITY_SCORE: 37

## 2023-12-25 NOTE — ED TRIAGE NOTES
Patient presents with complaints of chest pain and shortness of breath that started around 1:00 am. Patient denied dizziness and nausea.     Triage Assessment (Adult)       Row Name 12/25/23 0435          Triage Assessment    Airway WDL WDL        Respiratory WDL    Respiratory WDL WDL        Skin Circulation/Temperature WDL    Skin Circulation/Temperature WDL WDL        Cardiac WDL    Cardiac WDL X;chest pain        Peripheral/Neurovascular WDL    Peripheral Neurovascular WDL WDL        Cognitive/Neuro/Behavioral WDL    Cognitive/Neuro/Behavioral WDL WDL

## 2023-12-25 NOTE — ED PROVIDER NOTES
History     Chief Complaint:  Chest Pain       HPI   The patient's son supplements the history as the patient utilizes American sign language, they have declined formal .    Shiraz Ingram is a 89 year old male with a history of coronary artery disease status post CABG several years ago.  He presents with approximately 2 to 3 hours of midsternal chest discomfort described as burning which developed after Nabil meal last evening.  Symptoms have now completely resolved.  He denies associated shortness of breath or fevers.  There are no further aggravating or alleviating factors no further associated symptoms.  He is anticoagulated with Pradaxa.    Independent Historian:    Son - They report the above    Review of External Notes:  Urgent care and primary care notes from November of this year were reviewed    Medications:    Albuterol  Aspirin   Budesonide   Dabigatran   Hydralazine   Duo neb  Isosorbide mononitrate   Magnesium chloride   Metoprolol tartrate   Montelukast   Nitroglycerin   Pantoprazole  Simvastatin   Torsemide     Past Medical History:    Anemia  Aortic stenosis  Benign neoplasm of colon  CHF  CKD  Pneumonia  Coronary arthrosclerotics  Esophageal ulcer w/ UGI bleed  Hyperlipidemia  Hypertension  Mild persistent asthma  Persistent atrial fibrillation  Hearing loss    Past Surgical History:    Cardiac surgery  Colonoscopy  Coronary angiogram  Right heart catheterization  Aortic valve replacement  Pacemaker  Esophagoscopy  Inguinal herniorrhaphy  Cholecystectomy  Reverse arthroplasty shoulder  Coronary artery bypass  Colonoscopy    Physical Exam   Patient Vitals for the past 24 hrs:   BP Temp Temp src Pulse Resp SpO2 Weight   12/25/23 0830 (!) 186/74 -- -- (!) 49 13 92 % --   12/25/23 0815 (!) 173/79 -- -- 50 19 96 % --   12/25/23 0800 (!) 167/73 -- -- 50 16 95 % --   12/25/23 0730 (!) 175/78 -- -- (!) 49 12 98 % --   12/25/23 0700 (!) 150/71 -- -- (!) 49 17 96 % --   12/25/23 0600 (!)  168/70 -- -- 50 -- 96 % --   12/25/23 0500 (!) 155/85 -- -- (!) 49 12 97 % --   12/25/23 0440 (!) 166/69 98  F (36.7  C) Temporal 50 16 97 % 68 kg (150 lb)      Physical Exam  General: Alert, interactive   Head:  Scalp is atraumatic  Eyes:  The pupils are equal, round, and reactive to light    EOM's intact    No scleral icterus  ENT:      Nose:  The external nose is normal  Ears:  External ears are normal  Mouth/Throat: The oropharynx is normal    Mucus membranes are moist       Neck:  Normal range of motion.      There is no rigidity.    Trachea is in the midline         CV:  Regular rate and rhythm    2/6 systolic murmur, trace pedal edema, 2+ pulses in all 4 extremities  Resp:  Breath sounds are clear bilaterally    Non-labored, no retractions or accessory muscle use      GI:  Abdomen is soft, no distension, no tenderness.       MS:  Normal strength in all 4 extremities  Skin:  Warm and dry, No rash or lesions noted.  Neuro:      Strength 5/5 x4.  Sensation intact  In all 4 extremities.       Cranial nerves 2-12 intact.    GCS: 15  Psych: Awake. Alert.  Normal affect.      Appropriate interactions.    Emergency Department Course   ECG  ECG taken at 0436, ECG read at 0802  Ventricular-paced rhythm  Abnormal ECG   Rate 50 bpm. WI interval * ms. QRS duration 180 ms. QT/QTc 526/479 ms. P-R-T axes 108 -61 101.    Imaging:  XR Chest 2 Views   Final Result   IMPRESSION: Poststernotomy changes and prior TAVR. Single lead left subclavian venous pacer is intact and unchanged in position.      No interval change. Chronic elevation left hemidiaphragm. Cardiomegaly and pulmonary vascular congestion with trace effusions are unchanged. No overt signs of failure or pneumonia. Reverse right shoulder arthroplasty and exaggeration of thoracic kyphosis    with multiple compression fractures again noted.        Report per radiology    Laboratory:  Labs Ordered and Resulted from Time of ED Arrival to Time of ED Departure   BASIC  METABOLIC PANEL - Abnormal       Result Value    Sodium 143      Potassium 4.7      Chloride 106      Carbon Dioxide (CO2) 25      Anion Gap 12      Urea Nitrogen 54.0 (*)     Creatinine 1.91 (*)     GFR Estimate 33 (*)     Calcium 9.2      Glucose 111 (*)    TROPONIN T, HIGH SENSITIVITY - Abnormal    Troponin T, High Sensitivity 65 (*)    CBC WITH PLATELETS AND DIFFERENTIAL - Abnormal    WBC Count 3.3 (*)     RBC Count 5.37      Hemoglobin 10.7 (*)     Hematocrit 35.6 (*)     MCV 66 (*)     MCH 19.9 (*)     MCHC 30.1 (*)     RDW 17.6 (*)     Platelet Count 96 (*)     % Neutrophils 70      % Lymphocytes 17      % Monocytes 10      % Eosinophils 2      % Basophils 1      % Immature Granulocytes 0      NRBCs per 100 WBC 1 (*)     Absolute Neutrophils 2.3      Absolute Lymphocytes 0.6 (*)     Absolute Monocytes 0.3      Absolute Eosinophils 0.1      Absolute Basophils 0.0      Absolute Immature Granulocytes 0.0      Absolute NRBCs 0.0     TROPONIN T, HIGH SENSITIVITY - Abnormal    Troponin T, High Sensitivity 59 (*)    NT PROBNP INPATIENT - Abnormal    N terminal Pro BNP Inpatient 4,099 (*)    TROPONIN T, HIGH SENSITIVITY - Abnormal    Troponin T, High Sensitivity 55 (*)      Emergency Department Course & Assessments:  Interventions:  Medications   aspirin (ASA) chewable tablet 324 mg (324 mg Oral $Given 12/25/23 0924)      Assessments:  0750 I obtained history and examined the patient as noted above.   1009 I rechecked and updated the patient.     Independent Interpretation (X-rays, CTs, rhythm strip):  Chest radiograph with pacemaker in place, no focal infiltrate    Consultations/Discussion of Management or Tests:  None    Social Determinants of Health affecting care:  None     Disposition:  The patient was discharged to home.     Impression & Plan    Medical Decision Making:  Following presentation history and physical examination were performed, prior records were reviewed, the above workup was undertaken.  The  patient's symptoms had completely resolved prior to my evaluation.  His EKG is stable from prior and not acutely ischemic.  Troponin is elevated however the patient does have chronic kidney disease.  Troponin was obtained x 3 and is downtrending.  In discussion with the patient and his son this is not consistent with his previous episodes of angina, they believe this is likely secondary to his large Holiday meal last evening.  I discussed and recommended hospitalization but they feel comfortable with discharge to home and I think this is reasonable given the downtrending troponin and remainder of the workup.  He is anticoagulated and I doubt pulmonary embolism.  His chest radiograph demonstrates stable pulmonary vascular congestion and pleural effusions he is demonstrating no overt signs of congestive heart failure.  They felt comfortable discharge and will return if new symptoms develop.    Diagnosis:    ICD-10-CM    1. Chest pain, unspecified type  R07.9       2. Elevated troponin  R79.89       3. Stage 3b chronic kidney disease (H)  N18.32          Discharge Medications:  Discharge Medication List as of 12/25/2023 10:18 AM         Scribe Disclosure:  I, Edith Montero, am serving as a scribe at 10:27 AM on 12/25/2023 to document services personally performed by Maximo Jung MD based on my observations and the provider's statements to me.    12/25/2023   Maximo Jung MD Trigger, Brandon Thomas, MD  12/25/23 7109

## 2023-12-27 ENCOUNTER — OFFICE VISIT (OUTPATIENT)
Dept: FAMILY MEDICINE | Facility: CLINIC | Age: 88
End: 2023-12-27
Payer: COMMERCIAL

## 2023-12-27 VITALS
TEMPERATURE: 97.8 F | OXYGEN SATURATION: 95 % | DIASTOLIC BLOOD PRESSURE: 64 MMHG | HEART RATE: 50 BPM | WEIGHT: 153.6 LBS | RESPIRATION RATE: 14 BRPM | BODY MASS INDEX: 24.68 KG/M2 | SYSTOLIC BLOOD PRESSURE: 146 MMHG | HEIGHT: 66 IN

## 2023-12-27 DIAGNOSIS — R07.89 CHEST DISCOMFORT: Primary | ICD-10-CM

## 2023-12-27 DIAGNOSIS — K30 INDIGESTION: ICD-10-CM

## 2023-12-27 PROCEDURE — 99212 OFFICE O/P EST SF 10 MIN: CPT | Performed by: FAMILY MEDICINE

## 2023-12-27 ASSESSMENT — PAIN SCALES - GENERAL: PAINLEVEL: NO PAIN (0)

## 2023-12-27 ASSESSMENT — ASTHMA QUESTIONNAIRES: ACT_TOTALSCORE: 20

## 2023-12-27 NOTE — PROGRESS NOTES
"  Assessment & Plan     Chest discomfort  Indigestion  -Symptoms of over eating and indigestion  -Symptoms have completely resolved  -Follow-up with cardiology and PCP as previously scheduled       DO JUAN DIEGO [facility]    Javon Weldon is a 89 year old, presenting for the following health issues:  Hospital F/U        12/27/2023    10:36 AM   Additional Questions   Roomed by Elke ZAVALA     Pt seen with .  Seen in ED on 12/25/23. For chest heaviness and SOB after Nabil dinner.  Had cardiac work-up that was normal for patient.  Symptoms thought to be due to over eating and have resolved.  Feeling back to normal today.  Denies fever, SOB, CP, lower extremity swelling.      Review of Systems         Objective    BP (!) 146/64 (BP Location: Right arm, Patient Position: Sitting, Cuff Size: Adult Regular)   Pulse 50   Temp 97.8  F (36.6  C) (Temporal)   Resp 14   Ht 1.664 m (5' 5.5\")   Wt 69.7 kg (153 lb 9.6 oz)   SpO2 95%   BMI 25.17 kg/m    Body mass index is 25.17 kg/m .  Physical Exam   GENERAL: healthy, alert and no distress  RESP: lungs clear to auscultation - no rales, rhonchi or wheezes  CV: regular rate and rhythm, normal S1 S2, no S3 or S4, no murmur, click or rub, no peripheral edema and peripheral pulses strong  ABDOMEN: soft, nontender, no hepatosplenomegaly, no masses and bowel sounds normal  MS: no gross musculoskeletal defects noted, no edema  PSYCH: mentation appears normal, affect normal/bright                      "

## 2024-01-21 ENCOUNTER — TRANSFERRED RECORDS (OUTPATIENT)
Dept: HEALTH INFORMATION MANAGEMENT | Facility: CLINIC | Age: 89
End: 2024-01-21
Payer: COMMERCIAL

## 2024-02-08 ENCOUNTER — APPOINTMENT (OUTPATIENT)
Dept: CARDIOLOGY | Facility: CLINIC | Age: 89
DRG: 280 | End: 2024-02-08
Attending: HOSPITALIST
Payer: COMMERCIAL

## 2024-02-08 ENCOUNTER — HOSPITAL ENCOUNTER (INPATIENT)
Facility: CLINIC | Age: 89
LOS: 3 days | Discharge: HOME | DRG: 280 | End: 2024-02-11
Attending: EMERGENCY MEDICINE | Admitting: HOSPITALIST
Payer: COMMERCIAL

## 2024-02-08 ENCOUNTER — APPOINTMENT (OUTPATIENT)
Dept: GENERAL RADIOLOGY | Facility: CLINIC | Age: 89
DRG: 280 | End: 2024-02-08
Attending: EMERGENCY MEDICINE
Payer: COMMERCIAL

## 2024-02-08 DIAGNOSIS — I50.9 ACUTE ON CHRONIC CONGESTIVE HEART FAILURE, UNSPECIFIED HEART FAILURE TYPE (H): ICD-10-CM

## 2024-02-08 DIAGNOSIS — I50.31 ACUTE DIASTOLIC HEART FAILURE (H): ICD-10-CM

## 2024-02-08 DIAGNOSIS — J45.40 MODERATE PERSISTENT ASTHMA WITHOUT COMPLICATION: ICD-10-CM

## 2024-02-08 DIAGNOSIS — I50.33 ACUTE ON CHRONIC DIASTOLIC CONGESTIVE HEART FAILURE (H): Primary | ICD-10-CM

## 2024-02-08 DIAGNOSIS — Z95.2 S/P TAVR (TRANSCATHETER AORTIC VALVE REPLACEMENT): ICD-10-CM

## 2024-02-08 DIAGNOSIS — I25.10 CORONARY ARTERY DISEASE INVOLVING NATIVE CORONARY ARTERY OF NATIVE HEART WITHOUT ANGINA PECTORIS: ICD-10-CM

## 2024-02-08 DIAGNOSIS — E78.5 HYPERLIPIDEMIA WITH TARGET LDL LESS THAN 100: ICD-10-CM

## 2024-02-08 LAB
ANION GAP SERPL CALCULATED.3IONS-SCNC: 10 MMOL/L (ref 7–15)
ATRIAL RATE - MUSE: NORMAL BPM
BASOPHILS # BLD AUTO: 0 10E3/UL (ref 0–0.2)
BASOPHILS NFR BLD AUTO: 1 %
BUN SERPL-MCNC: 42.7 MG/DL (ref 8–23)
CALCIUM SERPL-MCNC: 9.1 MG/DL (ref 8.8–10.2)
CHLORIDE SERPL-SCNC: 105 MMOL/L (ref 98–107)
CREAT SERPL-MCNC: 1.77 MG/DL (ref 0.67–1.17)
DEPRECATED HCO3 PLAS-SCNC: 24 MMOL/L (ref 22–29)
DIASTOLIC BLOOD PRESSURE - MUSE: NORMAL MMHG
EGFRCR SERPLBLD CKD-EPI 2021: 36 ML/MIN/1.73M2
EOSINOPHIL # BLD AUTO: 0.1 10E3/UL (ref 0–0.7)
EOSINOPHIL NFR BLD AUTO: 2 %
ERYTHROCYTE [DISTWIDTH] IN BLOOD BY AUTOMATED COUNT: 18.1 % (ref 10–15)
FLUAV RNA SPEC QL NAA+PROBE: NEGATIVE
FLUBV RNA RESP QL NAA+PROBE: NEGATIVE
GLUCOSE SERPL-MCNC: 108 MG/DL (ref 70–99)
HCT VFR BLD AUTO: 31.1 % (ref 40–53)
HGB BLD-MCNC: 9.4 G/DL (ref 13.3–17.7)
HOLD SPECIMEN: NORMAL
IMM GRANULOCYTES # BLD: 0 10E3/UL
IMM GRANULOCYTES NFR BLD: 0 %
INTERPRETATION ECG - MUSE: NORMAL
LVEF ECHO: NORMAL
LYMPHOCYTES # BLD AUTO: 0.5 10E3/UL (ref 0.8–5.3)
LYMPHOCYTES NFR BLD AUTO: 12 %
MCH RBC QN AUTO: 19.3 PG (ref 26.5–33)
MCHC RBC AUTO-ENTMCNC: 30.2 G/DL (ref 31.5–36.5)
MCV RBC AUTO: 64 FL (ref 78–100)
MONOCYTES # BLD AUTO: 0.3 10E3/UL (ref 0–1.3)
MONOCYTES NFR BLD AUTO: 7 %
NEUTROPHILS # BLD AUTO: 3.1 10E3/UL (ref 1.6–8.3)
NEUTROPHILS NFR BLD AUTO: 78 %
NRBC # BLD AUTO: 0 10E3/UL
NRBC BLD AUTO-RTO: 0 /100
NT-PROBNP SERPL-MCNC: 6643 PG/ML (ref 0–1800)
P AXIS - MUSE: NORMAL DEGREES
PLATELET # BLD AUTO: 229 10E3/UL (ref 150–450)
POTASSIUM SERPL-SCNC: 4.6 MMOL/L (ref 3.4–5.3)
PR INTERVAL - MUSE: NORMAL MS
QRS DURATION - MUSE: 146 MS
QT - MUSE: 496 MS
QTC - MUSE: 470 MS
R AXIS - MUSE: -65 DEGREES
RBC # BLD AUTO: 4.87 10E6/UL (ref 4.4–5.9)
RSV RNA SPEC NAA+PROBE: NEGATIVE
SARS-COV-2 RNA RESP QL NAA+PROBE: NEGATIVE
SODIUM SERPL-SCNC: 139 MMOL/L (ref 135–145)
SYSTOLIC BLOOD PRESSURE - MUSE: NORMAL MMHG
T AXIS - MUSE: 34 DEGREES
TROPONIN T SERPL HS-MCNC: 59 NG/L
VENTRICULAR RATE- MUSE: 54 BPM
WBC # BLD AUTO: 3.9 10E3/UL (ref 4–11)

## 2024-02-08 PROCEDURE — 94640 AIRWAY INHALATION TREATMENT: CPT | Mod: 76

## 2024-02-08 PROCEDURE — 99223 1ST HOSP IP/OBS HIGH 75: CPT | Performed by: HOSPITALIST

## 2024-02-08 PROCEDURE — 93005 ELECTROCARDIOGRAM TRACING: CPT

## 2024-02-08 PROCEDURE — 93306 TTE W/DOPPLER COMPLETE: CPT | Mod: 26 | Performed by: INTERNAL MEDICINE

## 2024-02-08 PROCEDURE — 250N000011 HC RX IP 250 OP 636: Performed by: HOSPITALIST

## 2024-02-08 PROCEDURE — 999N000157 HC STATISTIC RCP TIME EA 10 MIN

## 2024-02-08 PROCEDURE — 85041 AUTOMATED RBC COUNT: CPT | Performed by: EMERGENCY MEDICINE

## 2024-02-08 PROCEDURE — C8929 TTE W OR WO FOL WCON,DOPPLER: HCPCS

## 2024-02-08 PROCEDURE — 250N000013 HC RX MED GY IP 250 OP 250 PS 637: Performed by: INTERNAL MEDICINE

## 2024-02-08 PROCEDURE — 250N000011 HC RX IP 250 OP 636: Performed by: EMERGENCY MEDICINE

## 2024-02-08 PROCEDURE — 36415 COLL VENOUS BLD VENIPUNCTURE: CPT | Performed by: EMERGENCY MEDICINE

## 2024-02-08 PROCEDURE — 84484 ASSAY OF TROPONIN QUANT: CPT | Performed by: EMERGENCY MEDICINE

## 2024-02-08 PROCEDURE — 210N000002 HC R&B HEART CARE

## 2024-02-08 PROCEDURE — 99291 CRITICAL CARE FIRST HOUR: CPT | Mod: 25

## 2024-02-08 PROCEDURE — 71046 X-RAY EXAM CHEST 2 VIEWS: CPT

## 2024-02-08 PROCEDURE — 250N000009 HC RX 250: Performed by: INTERNAL MEDICINE

## 2024-02-08 PROCEDURE — 999N000208 ECHOCARDIOGRAM COMPLETE

## 2024-02-08 PROCEDURE — 255N000002 HC RX 255 OP 636: Performed by: INTERNAL MEDICINE

## 2024-02-08 PROCEDURE — 83880 ASSAY OF NATRIURETIC PEPTIDE: CPT | Performed by: EMERGENCY MEDICINE

## 2024-02-08 PROCEDURE — 96374 THER/PROPH/DIAG INJ IV PUSH: CPT

## 2024-02-08 PROCEDURE — 99222 1ST HOSP IP/OBS MODERATE 55: CPT | Mod: 25 | Performed by: INTERNAL MEDICINE

## 2024-02-08 PROCEDURE — 99207 PR NO CHARGE LOS: CPT | Performed by: INTERNAL MEDICINE

## 2024-02-08 PROCEDURE — 80048 BASIC METABOLIC PNL TOTAL CA: CPT | Performed by: EMERGENCY MEDICINE

## 2024-02-08 PROCEDURE — 87637 SARSCOV2&INF A&B&RSV AMP PRB: CPT | Performed by: EMERGENCY MEDICINE

## 2024-02-08 PROCEDURE — 250N000013 HC RX MED GY IP 250 OP 250 PS 637: Performed by: HOSPITALIST

## 2024-02-08 PROCEDURE — 250N000011 HC RX IP 250 OP 636: Performed by: INTERNAL MEDICINE

## 2024-02-08 RX ORDER — IPRATROPIUM BROMIDE AND ALBUTEROL SULFATE 2.5; .5 MG/3ML; MG/3ML
3 SOLUTION RESPIRATORY (INHALATION) EVERY 4 HOURS PRN
Status: DISCONTINUED | OUTPATIENT
Start: 2024-02-08 | End: 2024-02-11 | Stop reason: HOSPADM

## 2024-02-08 RX ORDER — FUROSEMIDE 10 MG/ML
60 INJECTION INTRAMUSCULAR; INTRAVENOUS EVERY 8 HOURS
Status: DISCONTINUED | OUTPATIENT
Start: 2024-02-08 | End: 2024-02-09

## 2024-02-08 RX ORDER — ACETAMINOPHEN 500 MG
1000 TABLET ORAL EVERY 8 HOURS PRN
Status: DISCONTINUED | OUTPATIENT
Start: 2024-02-08 | End: 2024-02-08

## 2024-02-08 RX ORDER — ACETAMINOPHEN 650 MG/1
650 SUPPOSITORY RECTAL EVERY 4 HOURS PRN
Status: DISCONTINUED | OUTPATIENT
Start: 2024-02-08 | End: 2024-02-11 | Stop reason: HOSPADM

## 2024-02-08 RX ORDER — GUAIFENESIN/DEXTROMETHORPHAN 100-10MG/5
10 SYRUP ORAL EVERY 4 HOURS PRN
Status: DISCONTINUED | OUTPATIENT
Start: 2024-02-08 | End: 2024-02-11 | Stop reason: HOSPADM

## 2024-02-08 RX ORDER — ONDANSETRON 2 MG/ML
4 INJECTION INTRAMUSCULAR; INTRAVENOUS EVERY 6 HOURS PRN
Status: DISCONTINUED | OUTPATIENT
Start: 2024-02-08 | End: 2024-02-11 | Stop reason: HOSPADM

## 2024-02-08 RX ORDER — ISOSORBIDE MONONITRATE 30 MG/1
30 TABLET, EXTENDED RELEASE ORAL DAILY
Status: DISCONTINUED | OUTPATIENT
Start: 2024-02-08 | End: 2024-02-08

## 2024-02-08 RX ORDER — FUROSEMIDE 10 MG/ML
20 INJECTION INTRAMUSCULAR; INTRAVENOUS ONCE
Status: COMPLETED | OUTPATIENT
Start: 2024-02-08 | End: 2024-02-08

## 2024-02-08 RX ORDER — CALCIUM CARBONATE 500 MG/1
1000 TABLET, CHEWABLE ORAL 4 TIMES DAILY PRN
Status: DISCONTINUED | OUTPATIENT
Start: 2024-02-08 | End: 2024-02-11 | Stop reason: HOSPADM

## 2024-02-08 RX ORDER — SIMVASTATIN 40 MG
40 TABLET ORAL AT BEDTIME
Status: DISCONTINUED | OUTPATIENT
Start: 2024-02-08 | End: 2024-02-11 | Stop reason: HOSPADM

## 2024-02-08 RX ORDER — FUROSEMIDE 10 MG/ML
40 INJECTION INTRAMUSCULAR; INTRAVENOUS EVERY 12 HOURS
Status: CANCELLED | OUTPATIENT
Start: 2024-02-08

## 2024-02-08 RX ORDER — AMOXICILLIN 250 MG
2 CAPSULE ORAL 2 TIMES DAILY PRN
Status: DISCONTINUED | OUTPATIENT
Start: 2024-02-08 | End: 2024-02-11 | Stop reason: HOSPADM

## 2024-02-08 RX ORDER — DABIGATRAN ETEXILATE 75 MG/1
75 CAPSULE ORAL 2 TIMES DAILY
Status: DISCONTINUED | OUTPATIENT
Start: 2024-02-08 | End: 2024-02-11 | Stop reason: HOSPADM

## 2024-02-08 RX ORDER — HYDRALAZINE HYDROCHLORIDE 20 MG/ML
10 INJECTION INTRAMUSCULAR; INTRAVENOUS EVERY 4 HOURS PRN
Status: DISCONTINUED | OUTPATIENT
Start: 2024-02-08 | End: 2024-02-11 | Stop reason: HOSPADM

## 2024-02-08 RX ORDER — BUDESONIDE 0.5 MG/2ML
0.5 INHALANT ORAL 2 TIMES DAILY
Status: DISCONTINUED | OUTPATIENT
Start: 2024-02-08 | End: 2024-02-11 | Stop reason: HOSPADM

## 2024-02-08 RX ORDER — ALBUTEROL SULFATE 90 UG/1
1 AEROSOL, METERED RESPIRATORY (INHALATION) EVERY 4 HOURS PRN
Status: DISCONTINUED | OUTPATIENT
Start: 2024-02-08 | End: 2024-02-11 | Stop reason: HOSPADM

## 2024-02-08 RX ORDER — MONTELUKAST SODIUM 10 MG/1
10 TABLET ORAL DAILY
Status: DISCONTINUED | OUTPATIENT
Start: 2024-02-08 | End: 2024-02-11 | Stop reason: HOSPADM

## 2024-02-08 RX ORDER — LIDOCAINE 40 MG/G
CREAM TOPICAL
Status: DISCONTINUED | OUTPATIENT
Start: 2024-02-08 | End: 2024-02-11 | Stop reason: HOSPADM

## 2024-02-08 RX ORDER — HYDRALAZINE HYDROCHLORIDE 25 MG/1
25 TABLET, FILM COATED ORAL 3 TIMES DAILY
Status: DISCONTINUED | OUTPATIENT
Start: 2024-02-08 | End: 2024-02-11 | Stop reason: HOSPADM

## 2024-02-08 RX ORDER — LANOLIN ALCOHOL/MO/W.PET/CERES
3 CREAM (GRAM) TOPICAL
Status: DISCONTINUED | OUTPATIENT
Start: 2024-02-08 | End: 2024-02-11 | Stop reason: HOSPADM

## 2024-02-08 RX ORDER — ASPIRIN 81 MG/1
81 TABLET ORAL 2 TIMES DAILY
Status: DISCONTINUED | OUTPATIENT
Start: 2024-02-08 | End: 2024-02-11 | Stop reason: HOSPADM

## 2024-02-08 RX ORDER — ACETAMINOPHEN 325 MG/1
650 TABLET ORAL EVERY 4 HOURS PRN
Status: DISCONTINUED | OUTPATIENT
Start: 2024-02-08 | End: 2024-02-11 | Stop reason: HOSPADM

## 2024-02-08 RX ORDER — METOPROLOL TARTRATE 25 MG/1
25 TABLET, FILM COATED ORAL 2 TIMES DAILY
Status: DISCONTINUED | OUTPATIENT
Start: 2024-02-08 | End: 2024-02-11 | Stop reason: HOSPADM

## 2024-02-08 RX ORDER — ONDANSETRON 4 MG/1
4 TABLET, ORALLY DISINTEGRATING ORAL EVERY 6 HOURS PRN
Status: DISCONTINUED | OUTPATIENT
Start: 2024-02-08 | End: 2024-02-11 | Stop reason: HOSPADM

## 2024-02-08 RX ORDER — ISOSORBIDE MONONITRATE 60 MG/1
60 TABLET, EXTENDED RELEASE ORAL DAILY
Status: DISCONTINUED | OUTPATIENT
Start: 2024-02-09 | End: 2024-02-11 | Stop reason: HOSPADM

## 2024-02-08 RX ORDER — AMOXICILLIN 250 MG
1 CAPSULE ORAL 2 TIMES DAILY PRN
Status: DISCONTINUED | OUTPATIENT
Start: 2024-02-08 | End: 2024-02-11 | Stop reason: HOSPADM

## 2024-02-08 RX ORDER — PANTOPRAZOLE SODIUM 40 MG/1
40 TABLET, DELAYED RELEASE ORAL
Status: DISCONTINUED | OUTPATIENT
Start: 2024-02-08 | End: 2024-02-11 | Stop reason: HOSPADM

## 2024-02-08 RX ORDER — FUROSEMIDE 10 MG/ML
40 INJECTION INTRAMUSCULAR; INTRAVENOUS ONCE
Status: COMPLETED | OUTPATIENT
Start: 2024-02-08 | End: 2024-02-08

## 2024-02-08 RX ADMIN — IPRATROPIUM BROMIDE AND ALBUTEROL SULFATE 3 ML: .5; 3 SOLUTION RESPIRATORY (INHALATION) at 15:56

## 2024-02-08 RX ADMIN — HUMAN ALBUMIN MICROSPHERES AND PERFLUTREN 9 ML: 10; .22 INJECTION, SOLUTION INTRAVENOUS at 11:56

## 2024-02-08 RX ADMIN — SIMVASTATIN 40 MG: 40 TABLET, FILM COATED ORAL at 21:47

## 2024-02-08 RX ADMIN — MAGNESIUM 64 MG (MAGNESIUM CHLORIDE) TABLET,DELAYED RELEASE 535 MG: at 20:12

## 2024-02-08 RX ADMIN — MELATONIN TAB 3 MG 3 MG: 3 TAB at 20:12

## 2024-02-08 RX ADMIN — FUROSEMIDE 40 MG: 10 INJECTION, SOLUTION INTRAMUSCULAR; INTRAVENOUS at 05:52

## 2024-02-08 RX ADMIN — HYDRALAZINE HYDROCHLORIDE 10 MG: 20 INJECTION INTRAMUSCULAR; INTRAVENOUS at 14:43

## 2024-02-08 RX ADMIN — FUROSEMIDE 60 MG: 10 INJECTION, SOLUTION INTRAMUSCULAR; INTRAVENOUS at 13:54

## 2024-02-08 RX ADMIN — HYDRALAZINE HYDROCHLORIDE 25 MG: 25 TABLET, FILM COATED ORAL at 13:55

## 2024-02-08 RX ADMIN — MONTELUKAST 10 MG: 10 TABLET, FILM COATED ORAL at 10:58

## 2024-02-08 RX ADMIN — MAGNESIUM 64 MG (MAGNESIUM CHLORIDE) TABLET,DELAYED RELEASE 535 MG: at 10:57

## 2024-02-08 RX ADMIN — METOPROLOL TARTRATE 25 MG: 25 TABLET, FILM COATED ORAL at 10:57

## 2024-02-08 RX ADMIN — DABIGATRAN ETEXILATE MESYLATE 75 MG: 75 CAPSULE ORAL at 20:12

## 2024-02-08 RX ADMIN — ASPIRIN 81 MG: 81 TABLET, COATED ORAL at 10:57

## 2024-02-08 RX ADMIN — METOPROLOL TARTRATE 25 MG: 25 TABLET, FILM COATED ORAL at 20:12

## 2024-02-08 RX ADMIN — PANTOPRAZOLE SODIUM 40 MG: 40 TABLET, DELAYED RELEASE ORAL at 15:56

## 2024-02-08 RX ADMIN — FUROSEMIDE 60 MG: 10 INJECTION, SOLUTION INTRAMUSCULAR; INTRAVENOUS at 21:47

## 2024-02-08 RX ADMIN — DABIGATRAN ETEXILATE MESYLATE 75 MG: 75 CAPSULE ORAL at 12:43

## 2024-02-08 RX ADMIN — FUROSEMIDE 20 MG: 10 INJECTION, SOLUTION INTRAMUSCULAR; INTRAVENOUS at 04:19

## 2024-02-08 RX ADMIN — ACETAMINOPHEN 650 MG: 325 TABLET, FILM COATED ORAL at 20:12

## 2024-02-08 RX ADMIN — ASPIRIN 81 MG: 81 TABLET, COATED ORAL at 20:12

## 2024-02-08 RX ADMIN — HYDRALAZINE HYDROCHLORIDE 25 MG: 25 TABLET, FILM COATED ORAL at 20:12

## 2024-02-08 RX ADMIN — BUDESONIDE 0.5 MG: 0.5 INHALANT RESPIRATORY (INHALATION) at 10:57

## 2024-02-08 RX ADMIN — BUDESONIDE 0.5 MG: 0.5 INHALANT RESPIRATORY (INHALATION) at 20:52

## 2024-02-08 RX ADMIN — IPRATROPIUM BROMIDE AND ALBUTEROL SULFATE 3 ML: .5; 3 SOLUTION RESPIRATORY (INHALATION) at 20:52

## 2024-02-08 ASSESSMENT — ACTIVITIES OF DAILY LIVING (ADL)
ADLS_ACUITY_SCORE: 38
DIFFICULTY_COMMUNICATING: NO
ADLS_ACUITY_SCORE: 38
ADLS_ACUITY_SCORE: 38
DOING_ERRANDS_INDEPENDENTLY_DIFFICULTY: NO
ADLS_ACUITY_SCORE: 40
WEAR_GLASSES_OR_BLIND: YES
EQUIPMENT_CURRENTLY_USED_AT_HOME: WALKER, STANDARD
CHANGE_IN_FUNCTIONAL_STATUS_SINCE_ONSET_OF_CURRENT_ILLNESS/INJURY: NO
TOILETING_ISSUES: NO
VISION_MANAGEMENT: GLASSES
ADLS_ACUITY_SCORE: 40
WERE_AUXILIARY_AIDS_OFFERED?: YES
DESCRIBE_HEARING_LOSS: BILATERAL HEARING LOSS
CONCENTRATING,_REMEMBERING_OR_MAKING_DECISIONS_DIFFICULTY: NO
ADLS_ACUITY_SCORE: 40
ADLS_ACUITY_SCORE: 38
FALL_HISTORY_WITHIN_LAST_SIX_MONTHS: NO
ADLS_ACUITY_SCORE: 40
THE_FOLLOWING_AIDS_WERE_PROVIDED;: PATIENT DECLINED OFFER OF AUXILIARY AIDS
USE_OF_HEARING_ASSISTIVE_DEVICES: RIGHT HEARING AID
DIFFICULTY_EATING/SWALLOWING: NO
WALKING_OR_CLIMBING_STAIRS_DIFFICULTY: YES
WALKING_OR_CLIMBING_STAIRS: AMBULATION DIFFICULTY, ASSISTANCE 1 PERSON
HEARING_DIFFICULTY_OR_DEAF: YES
ADLS_ACUITY_SCORE: 38
ADLS_ACUITY_SCORE: 40
DRESSING/BATHING_DIFFICULTY: NO
ADLS_ACUITY_SCORE: 38

## 2024-02-08 NOTE — PROGRESS NOTES
Gave patient am meds that are available. Patient said he usually does 2 neb solutions. Writer explained that patient will be going to the floor after this currently running neb and to ask the floor RN for additional neb solution. Patient is comfortable and expressed that he has not further current needs.   Pt alert and acting age appropriate, no WOB

## 2024-02-08 NOTE — CONSULTS
Cardiology Progress Note          Assessment and Plan:       Acute on chronic congestive heart failure, Systolic    Acute exacerbation of CHF (congestive heart failure) (H)   Interstitial pulmonary edema  Left pleural effusion  Mild PHTN  CAD s/p CABG  S/P TAVR  NSTEMI- secondary to CHF  Bilateral LE edema    Recommend IV Diuresis  BP control with nitrates, hydralazine- will increase and may be cause of acute exacerbation  Echocardiogram  Will follow                      Interval History:     Shiraz Ingram is a pleasant 89-year-old gentleman followed in our clinic by Dr. Coates hip has a history of chronic biventricular heart failure, known coronary disease previous history coronary bypass surgery, status post transcatheter aortic valve replacement, history of permanent pacemaker, chronic atrial fibrillation, stage III chronic kidney disease who was recently hospitalized in [facility] for pulmonary congestion and treated with diuretics.  His creatinine at that time was approximately 2 he had mild positive troponin and his COVID testing was negative.  He was discharged home and told to follow-up with cardiology.  He now is brought in by his son for worsening shortness of breath and progressive lower extremity edema.  He denies any chest pain chest pressure or any bleeding fever chills.  He is resting comfortably in the bed upon my seeing him in the emergency room.  He has had somewhat of an accelerated hypertension complaint, and states he is has been compliant with his medications but somewhat difficult history of his incredibly hard of hearing.  History is obtained through verbal communication reading lips as well as writing on notepad.    Review of his chest x-ray shows interstitial pulmonary edema.  EKG shows ventricular paced rhythm.  Creatinine is 1.77 down from 1.91.  This most recent echocardiogram from a year ago showed ejection fraction of 45 to 50% in 622 mild pulmonary hypertension and good  functioning bioprosthetic aortic [employer] valve.              Review of Systems:   As per subjective, otherwise 5 systems reviewed and negative.           Physical Exam:   Blood pressure (!) 169/70, pulse 50, temperature 98  F (36.7  C), temperature source Oral, resp. rate 18, SpO2 100%.      Vital Sign Ranges  Temperature Temp  Av.6  F (37  C)  Min: 98  F (36.7  C)  Max: 99.1  F (37.3  C)   Blood pressure Systolic (24hrs), Av , Min:168 , Max:193        Diastolic (24hrs), Av, Min:66, Max:93      Pulse Pulse  Av.1  Min: 50  Max: 59   Respirations Resp  Av  Min: 18  Max: 20   Pulse oximetry SpO2  Av.1 %  Min: 96 %  Max: 100 %         Intake/Output Summary (Last 24 hours) at 2024 1005  Last data filed at 2024 0657  Gross per 24 hour   Intake --   Output 900 ml   Net -900 ml       Constitutional:   NAD   Skin:   Warm and dry   Head:   Nontraumatic   Neck:   Supple, symmetrical, trachea midline, no adenopathy, thyroid symmetric, not enlarged and no tenderness, skin normal   Lungs:   scattered wheezes   Cardiovascular:   Normal apical impulse, regular rate and rhythm, normal S1 and S2, no S3 or S4, and no murmur noted    Abdomen:   Benign   Extremities and Back:   Edema 3+   Neurological:   Grossly nonfocal            Medications:     Current Outpatient Medications   Medication Sig Dispense Refill    acetaminophen (TYLENOL) 500 MG tablet Take 1,000 mg by mouth every 8 hours as needed      albuterol (PROAIR HFA/PROVENTIL HFA/VENTOLIN HFA) 108 (90 Base) MCG/ACT inhaler INHALE 1 TO 2 PUFFS BY MOUTH EVERY 4 TO 6 HOURS AS NEEDED 18 g 2    aspirin (ASA) 81 MG EC tablet Take 81 mg by mouth 2 times daily 0700 1700      budesonide (PULMICORT) 0.5 MG/2ML neb solution Take 2 mLs (0.5 mg) by nebulization 2 times daily 120 mL 3    calcium carbonate 600 mg-vitamin D 400 units (CALTRATE) 600-400 MG-UNIT per tablet Take 1 tablet by mouth daily      dabigatran ANTICOAGULANT (PRADAXA) 150 MG capsule  Take 1 capsule (150 mg) by mouth 2 times daily 180 capsule 1    hydrALAZINE (APRESOLINE) 25 MG tablet Take 0.5 tablets (12.5 mg) by mouth 3 times daily 135 tablet 3    ipratropium - albuterol 0.5 mg/2.5 mg/3 mL (DUONEB) 0.5-2.5 (3) MG/3ML neb solution Inhale 1 vial (3 mLs) into the lungs 4 times daily 360 mL 11    isosorbide mononitrate (IMDUR) 30 MG 24 hr tablet Take 1 tablet (30 mg) by mouth daily 90 tablet 1    magnesium chloride (MAG64) 535 (64 Mg) MG TBEC CR tablet Take 1 tablet (535 mg) by mouth 2 times daily 180 tablet 4    metoprolol tartrate (LOPRESSOR) 25 MG tablet Take 1 tablet (25 mg) by mouth 2 times daily 180 tablet 1    montelukast (SINGULAIR) 10 MG tablet Take 1 tablet (10 mg) by mouth daily 90 tablet 1    multivitamin, therapeutic (THERA-VIT) TABS tablet Take 1 tablet by mouth 2 times daily      nitroGLYcerin (NITROSTAT) 0.4 MG sublingual tablet For chest pain place 1 tablet under the tongue every 5 minutes for 3 doses. If symptoms persist 5 minutes after 1st dose call 911. 12 tablet 0    pantoprazole (PROTONIX) 40 MG EC tablet TAKE 1 TABLET BEFORE MEALS TWICE A DAY Strength: 40 mg 180 tablet 1    simvastatin (ZOCOR) 40 MG tablet Take 1 tablet (40 mg) by mouth At Bedtime 90 tablet 4    torsemide (DEMADEX) 5 MG tablet Patient taking 10 mg in the morning. Take additional 5mg if weight gain of 2-3 lbs in 1 day. 270 tablet 3                Data:     Results for orders placed or performed during the hospital encounter of 02/08/24 (from the past 24 hour(s))   EKG 12 lead   Result Value Ref Range    Systolic Blood Pressure  mmHg    Diastolic Blood Pressure  mmHg    Ventricular Rate 54 BPM    Atrial Rate  BPM    UT Interval  ms    QRS Duration 146 ms     ms    QTc 470 ms    P Axis  degrees    R AXIS -65 degrees    T Axis 34 degrees    Interpretation ECG       Atrial fibrillation with slow ventricular response with frequent ventricular-paced complexes  Right bundle branch block  Left anterior  fascicular block  ** Bifascicular block **  Abnormal ECG  When compared with ECG of 25-DEC-2023 04:36,  Vent. rate has increased BY   4 BPM     [place] Draw    Narrative    The following orders were created for panel order [place] Draw.  Procedure                               Abnormality         Status                     ---------                               -----------         ------                     Extra Blue Top Tube[[id number]]                              Final result               Extra Green Top (Lithium...[[id number]]                      Final result               Extra Green Top (Lithium...[[id number]]                      Final result               Extra Purple Top Tube[[id number]]                            Final result                 Please view results for these tests on the individual orders.   Extra Blue Top Tube   Result Value Ref Range    Hold Specimen JIC    Extra Green Top (Lithium Heparin) Tube   Result Value Ref Range    Hold Specimen JIC    Extra Green Top (Lithium Heparin) Tube   Result Value Ref Range    Hold Specimen JIC    Extra Purple Top Tube   Result Value Ref Range    Hold Specimen JIC    CBC with platelets + differential    Narrative    The following orders were created for panel order CBC with platelets + differential.  Procedure                               Abnormality         Status                     ---------                               -----------         ------                     CBC with platelets and d...[[id number]]  Abnormal            Final result                 Please view results for these tests on the individual orders.   Basic metabolic panel   Result Value Ref Range    Sodium 139 135 - 145 mmol/L    Potassium 4.6 3.4 - 5.3 mmol/L    Chloride 105 98 - 107 mmol/L    Carbon Dioxide (CO2) 24 22 - 29 mmol/L    Anion Gap 10 7 - 15 mmol/L    Urea Nitrogen 42.7 (H) 8.0 - 23.0 mg/dL    Creatinine 1.77 (H) 0.67 - 1.17 mg/dL    GFR Estimate 36 (L) >60 mL/min/1.73m2     Calcium 9.1 8.8 - 10.2 mg/dL    Glucose 108 (H) 70 - 99 mg/dL   Troponin T, High Sensitivity   Result Value Ref Range    Troponin T, High Sensitivity 59 (H) <=22 ng/L   Nt probnp inpatient   Result Value Ref Range    N terminal Pro BNP Inpatient 6,643 (H) 0 - 1,800 pg/mL   CBC with platelets and differential   Result Value Ref Range    WBC Count 3.9 (L) 4.0 - 11.0 10e3/uL    RBC Count 4.87 4.40 - 5.90 10e6/uL    Hemoglobin 9.4 (L) 13.3 - 17.7 g/dL    Hematocrit 31.1 (L) 40.0 - 53.0 %    MCV 64 (L) 78 - 100 fL    MCH 19.3 (L) 26.5 - 33.0 pg    MCHC 30.2 (L) 31.5 - 36.5 g/dL    RDW 18.1 (H) 10.0 - 15.0 %    Platelet Count 229 150 - 450 10e3/uL    % Neutrophils 78 %    % Lymphocytes 12 %    % Monocytes 7 %    % Eosinophils 2 %    % Basophils 1 %    % Immature Granulocytes 0 %    NRBCs per 100 WBC 0 <1 /100    Absolute Neutrophils 3.1 1.6 - 8.3 10e3/uL    Absolute Lymphocytes 0.5 (L) 0.8 - 5.3 10e3/uL    Absolute Monocytes 0.3 0.0 - 1.3 10e3/uL    Absolute Eosinophils 0.1 0.0 - 0.7 10e3/uL    Absolute Basophils 0.0 0.0 - 0.2 10e3/uL    Absolute Immature Granulocytes 0.0 <=0.4 10e3/uL    Absolute NRBCs 0.0 10e3/uL   XR Chest 2 Views    Narrative    EXAM: XR CHEST 2 VIEWS  LOCATION: [facility]  DATE: 2/8/2024    INDICATION: sob, le edema, not taking chf meds  COMPARISON: 12/25/2023.    FINDINGS: Sternal wires, heart valve prosthesis and a left subclavian cardiac device. There is no pneumothorax. The heart is enlarged. There is pulmonary vascular congestion and interstitial pulmonary edema. Small bilateral pleural effusions, left larger   than right. Mild bibasilar probable atelectasis. Left hemidiaphragm is elevated.      Impression    IMPRESSION: Interstitial pulmonary edema.   Symptomatic Influenza A/B, RSV, & SARS-CoV2 PCR (COVID-19) Nasopharyngeal    Specimen: Nasopharyngeal; Swab   Result Value Ref Range    Influenza A PCR Negative Negative    Influenza B PCR Negative Negative    RSV PCR  Negative Negative    SARS CoV2 PCR Negative Negative    Narrative    Testing was performed using the Xpert Xpress CoV2/Flu/RSV Assay on the [employer] GeneXpert Instrument. This test should be ordered for the detection of SARS-CoV-2, influenza, and RSV viruses in individuals who meet clinical and/or epidemiological criteria. Test performance is unknown in asymptomatic patients. This test is for in vitro diagnostic use under the FDA EUA for laboratories certified under CLIA to perform high or moderate complexity testing. This test has not been FDA cleared or approved. A negative result does not rule out the presence of PCR inhibitors in the specimen or target RNA in concentration below the limit of detection for the assay. If only one viral target is positive but coinfection with multiple targets is suspected, the sample should be re-tested with another FDA cleared, approved, or authorized test, if coinfection would change clinical management. This test was validated by the [facility]. These laboratories are certified under the Clinical Laboratory Improvement Amendments of 1988 (CLIA-88) as qualified to perform high complexity laboratory testing.

## 2024-02-08 NOTE — H&P
[facility]    History and Physical  Hospitalist       Date of Admission:  2/8/2024  Date of Service (when I saw the patient): 02/08/24    ASSESSMENT  Shiraz Ingram is a markedly pleasant 89 year old gentleman with past medical history that is most notable for CAD, chronic systolic CHF, prior TAVR, prior pacemaker placement, chronic atrial fibrillation on DOAC, and CKD Stage 3, among others; who presents with dyspnea and bilateral lower extremity edema, and is found to have suspected acute CHF exacerbation causing acute pulmonary edema.    PLAN     Suspected acute CHF exacerbation causing acute pulmonary edema: Of note, Mr. Ingram is followed by [facility] Cardiology for CAD, status post prior CABG, as well as chronic atrial fibrillation, aortic stenosis status post TAVR, and chronic biventricular CHF with reduced EF. Last TTE from 6/2022 showed LVEF 45-50%, mild pulmonary hypertension, and 9 mmHg bioprosthetic valve gradient. At his last visit with Cardiology in 7/2023 he was noted to be feeling well, on Torsemide 10-15 mg daily.  Most recently, it seems he was hospitalized in [place], [place], for dyspnea. There is an incomplete paper chart with him from that hospitalization that shows Cr 2.00 and report of mild myonecrosis, negative COVID and Influenza testing, CXR that showed pulmonary vascular congestion. His son says he was treated for 3 days and told to follow up with his Cardiology team here at discharge to determine if his Torsemide dose should be changed. It is unclear to me if in fact that might have been held. He was discharged 1/22/24.    Now, he presents for evaluation of recurrent acute dyspnea and worsening bilateral lower extremity edema . In the ED, he is afebrile, not hypoxic at rest. He has irregular bradycardia and accelerated hypertension. He has chronic leukopenia and anemia as discussed below. Pro-BNP is 6643, and had been 4099 on 12/25/23. EKG confirms atrial fibrillation  "with slow RVR and intermittent pacing. Troponin T is 59, unchanged from recent previous Troponin checks. CXR shows interstitial pulmonary edema. Overall, his symptoms are consistent with acute CHF exacerbation causing acute pulmonary edema. His myonecrosis seems to be chronic, in the setting of CKD. We will rule out ACS.       -- Inpatient. Telemetry, serial enzymes, TTE ordered. Cardiology consulted. Diurese with Lasix 60 mg IV q 8 hours. Strict I and O's, daily weights, and low sodium diet ordered.    -- Repeat CBC and BMP in AM. SW consulted for disposition planning.    Stage 3 CKD: Noted. He could have cardiorenal syndrome. Monitor closely while hospitalized and diuresing. Repeat BMP in AM.    Recent Labs   Lab Test 02/08/24  0245 12/25/23  0451 11/12/23  1429   CR 1.77* 1.91* 1.98*     CAD: He is status post CABG in 2002 [LIMA -LAD, VG-OM and the RCA]. Most recent cardiac catheterization 4/2020 reportedly revealed: \"Moderate-severe touchdown lesion of the right PDA branch of the IGR-dKNL-aOP Y-graft. Otherwise widely patent LIMA-LAD, SVG-OM, and rPL Y-graft branch.\"     -- Resume home ASA when verified.    Accelerated hypertension: Without clear signs of hypertensive crisis.    -- Resume home Hydralazine, Imdur, and Metoprolol when verified.    Hyperlipidemia: Resume home Zocor when verified    Chronic atrial fibrillation and MAYA thrombus: Currently bradycardic. He has a pacemaker in place as discussed below.    -- Resume home Pradaxa when verified.    Aortic stenosis status post TAVR: 29mm Medtronic Evolut Pro, implanted  7/2020. That was complicated by heart block, leading to placement of permanent pacemaker as well. Noted    Chronic anemia and leukopenia: He is followed by Dr. Morris of Hematology for known lung nodules and renal masses, as well as chronic pancytopenia, which is being monitored serially. MDS has been thought possible, and if it worsens, the plan has been to consider bone marrow biopsy. " Today's low cell counts are not markedly different from recent prior values.     -- Daily CBC while hospitalized; resume home iron supplementation at discharge    Recent Labs   Lab Test 02/08/24  0245 12/25/23  0451 11/01/23  1124   HGB 9.4* 10.7* 11.1*     WBC   Date Value Ref Range Status   06/07/2021 3.2 (L) 4.0 - 11.0 10e9/L Final     WBC Count   Date Value Ref Range Status   02/08/2024 3.9 (L) 4.0 - 11.0 10e3/uL Final      PUD: He was seen 5/2020 by Dr. Fournier of GI when hospitalized for acute upper GI bleeding, and EGD showed a bleeding esophageal ulcer that was cauterized.     -- Resume home BID PPI when verified.    Asthma, moderate persistent: By history: No signs of wheezing at present. Resume home inhalers and Singulair when verified     History of SBO due to incarcerated hernia: Status post open surgical repair in 1/2023. Noted    I have spent 75 minutes on the date of service doing chart review, history, examination, documentation, and further activities per the note.    Chief Complaint   Dyspnea    History is obtained from the patient , his son at the bedside, and the ED physician whom I have spoken with    History of Present Illness   Shiraz Ingram is a markedly pleasant 89 year old gentleman who presents with dyspnea. He is deaf and uses sign language with his son for communication. He says that about 3-4 days ago, he developed acute dyspnea while at rest, exacerbated by trying to move and also exacerbated when he lays down at night to sleep (he uses one pillow normally to sleep at night). He has also had paroxysmal nocturnal dyspnea, and he has noted worsening bilateral lower leg swelling. Tonight he texted his son about these symptoms, and he was brought in for further evaluation.it seems he was recently hospitalized in [place], [place], for dyspnea. He was on vacation there and a couple of weeks in, he developed dyspnea, and was hospitalized there for 3 days. He was discharged 1/22/24 and advised  to follow up with his Cardiology team here. Initially after that discharge he felt better, until 3-4 days ago. His son says he had eaten lobster and other such food in [place] but here he tends to try to avoid salt, though he will eat soups that have salt in them. He does not use ETOH. He otherwise denies any cough, or chest pain, palpitations, or light headedness, or fever, chills, or sweats, or any other acute complaints. Supplemental oxygen started for comfort in the ED has improved his dyspnea.    In the ED,   02/08 0157 193/83  99.1  F (37.3  C) 59 20 96 %     CBC and BMP were notable for WBC 3.9, HGB 9.4, MCV 64, BUN 42.7, Cr 1.77, Glucose 108, otherwise were within the normal reference range. Pro BNP was 6643. Troponin T was 59. EKG showed atrial fibrillation with slow RVR and intermittent pacing.    He was given 20 mg IV Lasix in the ED.    Recent Results (from the past 24 hour(s))   XR Chest 2 Views    Narrative    EXAM: XR CHEST 2 VIEWS  LOCATION: [facility]  DATE: 2/8/2024    INDICATION: sob, le edema, not taking chf meds  COMPARISON: 12/25/2023.    FINDINGS: Sternal wires, heart valve prosthesis and a left subclavian cardiac device. There is no pneumothorax. The heart is enlarged. There is pulmonary vascular congestion and interstitial pulmonary edema. Small bilateral pleural effusions, left larger   than right. Mild bibasilar probable atelectasis. Left hemidiaphragm is elevated.      Impression    IMPRESSION: Interstitial pulmonary edema.       PHYSICAL EXAM  Blood pressure (!) 169/66, pulse 53, temperature 99.1  F (37.3  C), temperature source Temporal, resp. rate 20, SpO2 98%.  Constitutional: Alert and oriented to person, place and time; no apparent distress  Respiratory: lungs have crackles without wheezing to auscultation bilaterally  Cardiovascular: Irregular S1 S2  GI: abdomen soft non tender non distended bowel sounds positive  Musculoskeletal: moderate to severe  bilateral LE edema  Neurologic: extra-ocular muscles intact; moves all four extremities     DVT Prophylaxis: DOAC  Code Status: Full Code, discussed and confirmed with him and his son; he does not ever want a prolonged period of time on a ventilator but would want a time limited trial of resuscitation    Disposition: Expected discharge in 2-4 days    Dennis Fournier MD, MD    Past Medical History    I have reviewed this patient's medical history and updated it with pertinent information if needed.   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    CKD (chronic kidney disease) stage 3, GFR 30-59 ml/min (H) 05/12/2011    Community acquired pneumonia 09/29/2020    Coronary atherosclerosis of unspecified type of vessel, native or graft     s/p CABG 2002    Esophageal ulcer w UGI bleed 05/24/2020    Had EGD adn caurtery 5/24/20, colonoscopy with diverticulosis then    Hyperlipidemia LDL goal < 100     Hypertension goal BP (blood pressure) < 140/90     Localized osteoarthrosis not specified whether primary or secondary, lower leg     left knee    Mild persistent asthma without complication 01/11/2016    Moderate persistent asthma     JOSÉ ANTONIO (obstructive sleep apnea)     Persistent atrial fibrillation (H)     Unspecified hearing loss        Past Surgical History   I have reviewed this patient's surgical history and updated it with pertinent information if needed.  Past Surgical History:   Procedure Laterality Date    CARDIAC SURGERY      COLONOSCOPY N/A 5/26/2020    Procedure: COLONOSCOPY;  Surgeon: Ignacio Fournier MD;  Location:  GI    CV ANGIOGRAM CORONARY GRAFT N/A 4/24/2020    Procedure: Angiogram Coronary Graft;  Surgeon: Addison Willard MD;  Location:  HEART CARDIAC CATH LAB    CV CORONARY ANGIOGRAM N/A 4/24/2020     Procedure: Coronary Angiogram;  Surgeon: Addison Willard MD;  Location:  HEART CARDIAC CATH LAB    CV RIGHT HEART CATH MEASUREMENTS RECORDED N/A 4/24/2020    Procedure: Right Heart Cath;  Surgeon: Addison Willard MD;  Location:  HEART CARDIAC CATH LAB    CV TRANSCATHETER AORTIC VALVE REPLACEMENT N/A 7/14/2020    Procedure: Transcatheter Aortic Valve Replacement;  Surgeon: Soraida Monet MD;  Location:  HEART CARDIAC CATH LAB    EP PACEMAKER N/A 7/15/2020    Procedure: EP Pacemaker;  Surgeon: Tommie Sauer MD;  Location:  HEART CARDIAC CATH LAB    HC ESOPHAGOSCOPY, DIAGNOSTIC  5/03    Dr. Dow, lower esophageal ring & mild gastritis    HERNIORRHAPHY INGUINAL Right 9/26/2016    Procedure: HERNIORRHAPHY INGUINAL;  Surgeon: Alexis Alexandra MD;  Location: [facility]    HERNIORRHAPHY INGUINAL Left 1/13/2023    Procedure: LEFT GROIN EXPLORATION, SEGMENTAL SMALL BOWEL RESECTION, TRANSINGUINAL REPAIR OF INCARCERATED/STRANGULATED HERNIA;  Surgeon: Dwayne Russell MD;  Location:  OR    LAPAROSCOPIC CHOLECYSTECTOMY  12/03    for biliary sludge    REVERSE ARTHROPLASTY SHOULDER Right 2/25/2022    Procedure: RIGHT REVERSE SHOULDER ARTHROPLASTY, WITH OPEN REDUCTION INTERNAL FIXATION, WITH NO CUSTOM GUIDE;  Surgeon: Wiley Vargas MD;  Location:  OR    Z NONSPECIFIC PROCEDURE  5/02    Coronary artery bypass    [facility] COLONOSCOPY THRU STOMA W BIOPSY/CAUTERY TUMOR/POLYP/LESION  5/03    Dr. Dow, [facility]CAUTERY TUMOR/POLYP/LESION  12/199    Dr. Dow, one adenomatous polyp       Prior to Admission Medications   Prior to Admission Medications   Prescriptions Last Dose Informant Patient Reported? Taking?   acetaminophen (TYLENOL) 500 MG tablet  Self Yes No   Sig: Take 1,000 mg by mouth every 8 hours as needed   albuterol (PROAIR HFA/PROVENTIL HFA/VENTOLIN HFA) 108 (90 Base) MCG/ACT inhaler   No No   Sig: INHALE 1 TO 2 PUFFS BY MOUTH  EVERY 4 TO 6 HOURS AS NEEDED   aspirin (ASA) 81 MG EC tablet  Self Yes No   Sig: Take 81 mg by mouth 2 times daily   budesonide (PULMICORT) 0.5 MG/2ML neb solution   No No   Sig: Take 2 mLs (0.5 mg) by nebulization 2 times daily   calcium carbonate 600 mg-vitamin D 400 units (CALTRATE) 600-400 MG-UNIT per tablet  Self Yes No   Sig: Take 1 tablet by mouth daily   dabigatran ANTICOAGULANT (PRADAXA) 150 MG capsule   No No   Sig: Take 1 capsule (150 mg) by mouth 2 times daily   hydrALAZINE (APRESOLINE) 25 MG tablet   No No   Sig: Take 0.5 tablets (12.5 mg) by mouth 3 times daily   ipratropium - albuterol 0.5 mg/2.5 mg/3 mL (DUONEB) 0.5-2.5 (3) MG/3ML neb solution   No No   Sig: Inhale 1 vial (3 mLs) into the lungs 4 times daily   isosorbide mononitrate (IMDUR) 30 MG 24 hr tablet   No No   Sig: Take 1 tablet (30 mg) by mouth daily   magnesium chloride (MAG64) 535 (64 Mg) MG TBEC CR tablet   No No   Sig: Take 1 tablet (535 mg) by mouth 2 times daily   metoprolol tartrate (LOPRESSOR) 25 MG tablet   No No   Sig: Take 1 tablet (25 mg) by mouth 2 times daily   montelukast (SINGULAIR) 10 MG tablet   No No   Sig: Take 1 tablet (10 mg) by mouth daily   multivitamin, therapeutic (THERA-VIT) TABS tablet  Self Yes No   Sig: Take 1 tablet by mouth 2 times daily   nitroGLYcerin (NITROSTAT) 0.4 MG sublingual tablet   No No   Sig: For chest pain place 1 tablet under the tongue every 5 minutes for 3 doses. If symptoms persist 5 minutes after 1st dose call 911.   pantoprazole (PROTONIX) 40 MG EC tablet   No No   Sig: TAKE 1 TABLET BEFORE MEALS TWICE A DAY Strength: 40 mg   simvastatin (ZOCOR) 40 MG tablet   No No   Sig: Take 1 tablet (40 mg) by mouth At Bedtime   torsemide (DEMADEX) 5 MG tablet   No No   Sig: Patient taking 10 mg in the morning. Take additional 5mg if weight gain of 2-3 lbs in 1 day.      Facility-Administered Medications: None     Allergies   No Known Allergies    Social History   I have reviewed this patient's social  history and updated it with pertinent information if needed. Shiraz Ingram  reports that he has never smoked. He has never used smokeless tobacco. He reports that he does not currently use alcohol. He reports that he does not use drugs.    Family History   Family history assessed and, except as above, is non-contributory.    Family History   Problem Relation Age of Onset    C.A.D. Father     Cancer Mother         breast cancer,  of pneumonia    Cerebrovascular Disease Son     CABG Son     Family History Negative Child     Family History Negative Sister     Heart Disease Brother        Review of Systems   The 10 point Review of Systems is negative other than noted in the HPI or here.     Primary Care Physician   Dany Rodriguez MD    Data   Labs Ordered and Resulted from Time of ED Arrival to Time of ED Departure   BASIC METABOLIC PANEL - Abnormal       Result Value    Sodium 139      Potassium 4.6      Chloride 105      Carbon Dioxide (CO2) 24      Anion Gap 10      Urea Nitrogen 42.7 (*)     Creatinine 1.77 (*)     GFR Estimate 36 (*)     Calcium 9.1      Glucose 108 (*)    TROPONIN T, HIGH SENSITIVITY - Abnormal    Troponin T, High Sensitivity 59 (*)    NT PROBNP INPATIENT - Abnormal    N terminal Pro BNP Inpatient 6,643 (*)    CBC WITH PLATELETS AND DIFFERENTIAL - Abnormal    WBC Count 3.9 (*)     RBC Count 4.87      Hemoglobin 9.4 (*)     Hematocrit 31.1 (*)     MCV 64 (*)     MCH 19.3 (*)     MCHC 30.2 (*)     RDW 18.1 (*)     Platelet Count 229      % Neutrophils 78      % Lymphocytes 12      % Monocytes 7      % Eosinophils 2      % Basophils 1      % Immature Granulocytes 0      NRBCs per 100 WBC 0      Absolute Neutrophils 3.1      Absolute Lymphocytes 0.5 (*)     Absolute Monocytes 0.3      Absolute Eosinophils 0.1      Absolute Basophils 0.0      Absolute Immature Granulocytes 0.0      Absolute NRBCs 0.0     INFLUENZA A/B, RSV, & SARS-COV2 PCR       Data reviewed today:  I personally reviewed  the EKG tracing showing atrial fibrillation with slow RVR and intermittent pacing and the chest x-ray image(s) showing interstitial pulmonary edema .

## 2024-02-08 NOTE — PROGRESS NOTES
Non-billing note:    89-year-old male with multiple medical problems including coronary artery disease s/p bypass graft, aortic stenosis s/p TAVR, chronic diastolic congestive heart failure, essential hypertension, chronic atrial fibrillation on DOAC, CKD stage III, chronic anemia, peptic ulcer disease, COPD/asthma, extremely hard of hearing who uses sign language for interpretation presented to the emergency room with worsening dyspnea and bilateral lower extremity edema and is admitted with acute pulmonary edema/CHF exacerbation.  Patient is fairly comfortable at the time of my evaluation and is on 2 L oxygen.  Noted high blood pressure of 190/86  Has been initiated on IV Lasix 60 mg 3 times daily    Plan:  -Continue IV diuretics  -Await cardiology evaluation  -Echo today  -Home medications resumed-including aspirin 81 mg daily, Pradaxa 150 mg twice a day, isosorbide mononitrate, metoprolol, hydralazine  -Will also add as needed IV hydralazine for blood pressure.  -BMP ordered for tomorrow morning.

## 2024-02-08 NOTE — PHARMACY-ADMISSION MEDICATION HISTORY
Pharmacist Admission Medication History    Admission medication history is complete. The information provided in this note is only as accurate as the sources available at the time of the update.    Information Source(s): Patient, Family member, and CareEverywhere/SureScripts via in-person    Pertinent Information: Son used as the     Pt takes meds at 0500 1200 1700 2200    Changes made to PTA medication list:  Added: None  Deleted: None  Changed: None    Allergies reviewed with patient and updates made in EHR: no    Medication History Completed By: Winsome Tamayo [facility] 2/8/2024 8:20 AM    Prior to Admission medications    Medication Sig Last Dose Taking? Auth Provider Long Term End Date   acetaminophen (TYLENOL) 500 MG tablet Take 1,000 mg by mouth every 8 hours as needed  at prn Yes Reported, Patient     albuterol (PROAIR HFA/PROVENTIL HFA/VENTOLIN HFA) 108 (90 Base) MCG/ACT inhaler INHALE 1 TO 2 PUFFS BY MOUTH EVERY 4 TO 6 HOURS AS NEEDED  at prn Yes Dany Rodriguez MD Yes    aspirin (ASA) 81 MG EC tablet Take 81 mg by mouth 2 times daily 0700 1700 2/7/2024 at 1700 Yes Reported, Patient No    budesonide (PULMICORT) 0.5 MG/2ML neb solution Take 2 mLs (0.5 mg) by nebulization 2 times daily 2/7/2024 at 1700 Yes Dany Rodriguez MD Yes    calcium carbonate 600 mg-vitamin D 400 units (CALTRATE) 600-400 MG-UNIT per tablet Take 1 tablet by mouth daily 2/7/2024 at 0500 Yes Unknown, Entered By History     dabigatran ANTICOAGULANT (PRADAXA) 150 MG capsule Take 1 capsule (150 mg) by mouth 2 times daily 2/7/2024 at 1700 Yes IpJaxon MD     hydrALAZINE (APRESOLINE) 25 MG tablet Take 0.5 tablets (12.5 mg) by mouth 3 times daily 2/7/2024 at 2200 Yes IpJaxon MD Yes    ipratropium - albuterol 0.5 mg/2.5 mg/3 mL (DUONEB) 0.5-2.5 (3) MG/3ML neb solution Inhale 1 vial (3 mLs) into the lungs 4 times daily 2/7/2024 at 2200 Yes Dany Rodriguez MD Yes     isosorbide mononitrate (IMDUR) 30 MG 24 hr tablet Take 1 tablet (30 mg) by mouth daily 2/7/2024 at 0500 Yes Jaxon Field MD Yes    magnesium chloride (MAG64) 535 (64 Mg) MG TBEC CR tablet Take 1 tablet (535 mg) by mouth 2 times daily 2/7/2024 at 1700 Yes Jaxon Field MD     metoprolol tartrate (LOPRESSOR) 25 MG tablet Take 1 tablet (25 mg) by mouth 2 times daily 2/7/2024 at 2200 Yes Dany Rodriguez MD Yes    montelukast (SINGULAIR) 10 MG tablet Take 1 tablet (10 mg) by mouth daily 2/7/2024 at 0500 Yes Dany Rodriguez MD Yes    multivitamin, therapeutic (THERA-VIT) TABS tablet Take 1 tablet by mouth 2 times daily 2/7/2024 at 0500 Yes Unknown, Entered By History     nitroGLYcerin (NITROSTAT) 0.4 MG sublingual tablet For chest pain place 1 tablet under the tongue every 5 minutes for 3 doses. If symptoms persist 5 minutes after 1st dose call 911.  at prn Yes Dany Rodriguez MD Yes    pantoprazole (PROTONIX) 40 MG EC tablet TAKE 1 TABLET BEFORE MEALS TWICE A DAY Strength: 40 mg 2/7/2024 at 1700 Yes Dany Rodriguez MD     simvastatin (ZOCOR) 40 MG tablet Take 1 tablet (40 mg) by mouth At Bedtime 2/7/2024 at 2200 Yes Jaxon Field MD Yes    torsemide (DEMADEX) 5 MG tablet Patient taking 10 mg in the morning. Take additional 5mg if weight gain of 2-3 lbs in 1 day. 2/7/2024 at 0500 Yes Jaxon Field MD Yes 3/16/24

## 2024-02-08 NOTE — ED TRIAGE NOTES
Patient resting comfortably on cart. Son is at bedside interpreting ASL for patient but is to leave soon.  will be coming. Waiting on orders and pharmacy for am medications.

## 2024-02-08 NOTE — PLAN OF CARE
RECEIVING UNIT ED HANDOFF REVIEW    ED Nurse Handoff Report was reviewed by: Reva Bender RN on February 8, 2024 at 10:52 AM

## 2024-02-08 NOTE — PROVIDER NOTIFICATION
MD Notification    Notified Person: MD    Notified Person Name: Dr. Romero    Notification Date/Time: 2/8/24 @ 1509    Notification Interaction: Lindsay    Purpose of Notification: Pt still feeling SOB at rest, states it's uncomfortable. BP elevated 190's, but IV hydralazine just given. O2 100%/RA. Duoneb or something similar?     Orders Received: Pending    Comments:

## 2024-02-08 NOTE — ED PROVIDER NOTES
History     Chief Complaint:  Shortness of Breath       HPI   Shiraz Ingram is a 89 year old male with history of chronic kidney disease, AFIB and asthma who presents to the ED with his son for evaluation of shortness of breath. He reports having shortness of breath and having difficulty to breathe. Patient had recent travel to [place] and was hospitalized 3 days there for shortness of breath. His son reports the [facility] gave the patient oxygen and kept hs edema under control. His son states patient couldn't walk after the hospitalization but the leg swelling was better after he left. Patient was told to take torsemide but son reports the patient was never prescribed it. His son notes patient was eating more salt than he should when he was in [place] along with having fluids around his heart. No use of recent medications.     Independent Historian:    Son provides supplemental history as noted above. He was also able to interpret for the patient. His other son provided history via phone as well.     Review of External Notes:  I reviewed the paper notes that were provided by the hospital the patient was at in [place].       Medications:    Albuterol inhaler   Aspirin 81 mg  Calcium carbonate  Pradaxa  Apresoline  Imdur  Magnesium chloride  Lopressor  Singulair  Multivitamin  Nitrostat  Protonix  Zocor  Torsemide     Past Medical History:    Vitamin B12 deficiency  Aortic stenosis   Benign neoplasm of colon  Pacemaker  CKD (chronic kidney disease) stage 3  Community acquired pneumonia  Coronary atherosclerosis of unspecified type of vessel, native or graft  Esophageal ulcer w UGI bleed  Hyperlipidemia   Hypertension   Localized osteoarthrosis not specified whether primary or secondary, lower leg  Mild persistent asthma without complication  Moderate persistent asthma  Persistent atrial fibrillation   Unspecified hearing loss  Small bowel obstruction  Iron deficiency anemia   Acute CHF     Past Surgical  History:    Cardiac surgery  Colonoscopy  CV Angiogram Coronary graft  Coronary angiogram  Right heart cath measurements recorded  Transcatheter aortic value replacement  Pacemaker  Esophagoscopy diagnostic  Herniorrhaphy inguinal - right, left  Laparoscopic cholecystectomy  Reverse arthoplasty shoulder - right  Coronary artery bypass  Colonoscopy thru stoma with biopsy/cautery tumor/polyp/lesion x2     Physical Exam   Patient Vitals for the past 24 hrs:   BP Temp Temp src Pulse Resp SpO2   02/08/24 0432 (!) 182/78 -- -- 52 -- 100 %   02/08/24 0425 (!) 168/90 -- -- 57 -- 100 %   02/08/24 0230 (!) 169/66 -- -- 53 -- 98 %   02/08/24 0157 (!) 193/83 99.1  F (37.3  C) Temporal 59 20 96 %      Physical Exam  General: Resting on the gurney, appears mild uncomfortable  Head:  The scalp, face, and head appear normal  Mouth/Throat: Mucus membranes are moist  CV:  Regular rate    Normal S1 and S2  No pathological murmur   Resp:  Significantly diminished breath sounds in the bases bilaterally     Somewhat labored, no retractions or accessory muscle use    No coarseness    No wheezing   GI:  Abdomen is soft, no rigidity    No tenderness to palpation  MS:  Normal motor assessment of all extremities.    Good capillary refill noted.    Bilateral lower extremity edema  Skin:  No rash or lesions noted.  Neuro:   Speech is normal and fluent. No apparent deficit.  Psych: Awake. Alert.  Normal affect.      Appropriate interactions.    Emergency Department Course   ECG  ECG results from 02/08/24   EKG 12 lead     Value    Systolic Blood Pressure     Diastolic Blood Pressure     Ventricular Rate 54    Atrial Rate     ME Interval     QRS Duration 146        QTc 470    P Axis     R AXIS -65    T Axis 34    Interpretation ECG      Atrial fibrillation with slow ventricular response with frequent ventricular-paced complexes  Right bundle branch block  Left anterior fascicular block  ** Bifascicular block **  Abnormal ECG  When compared  with ECG of 25-DEC-2023 04:36,  Vent. rate has increased BY   4 BPM       Imaging:  XR Chest 2 Views   Final Result   IMPRESSION: Interstitial pulmonary edema.        Report per radiology    Laboratory:  Labs Ordered and Resulted from Time of ED Arrival to Time of ED Departure   BASIC METABOLIC PANEL - Abnormal       Result Value    Sodium 139      Potassium 4.6      Chloride 105      Carbon Dioxide (CO2) 24      Anion Gap 10      Urea Nitrogen 42.7 (*)     Creatinine 1.77 (*)     GFR Estimate 36 (*)     Calcium 9.1      Glucose 108 (*)    TROPONIN T, HIGH SENSITIVITY - Abnormal    Troponin T, High Sensitivity 59 (*)    NT PROBNP INPATIENT - Abnormal    N terminal Pro BNP Inpatient 6,643 (*)    CBC WITH PLATELETS AND DIFFERENTIAL - Abnormal    WBC Count 3.9 (*)     RBC Count 4.87      Hemoglobin 9.4 (*)     Hematocrit 31.1 (*)     MCV 64 (*)     MCH 19.3 (*)     MCHC 30.2 (*)     RDW 18.1 (*)     Platelet Count 229      % Neutrophils 78      % Lymphocytes 12      % Monocytes 7      % Eosinophils 2      % Basophils 1      % Immature Granulocytes 0      NRBCs per 100 WBC 0      Absolute Neutrophils 3.1      Absolute Lymphocytes 0.5 (*)     Absolute Monocytes 0.3      Absolute Eosinophils 0.1      Absolute Basophils 0.0      Absolute Immature Granulocytes 0.0      Absolute NRBCs 0.0     INFLUENZA A/B, RSV, & SARS-COV2 PCR - Normal    Influenza A PCR Negative      Influenza B PCR Negative      RSV PCR Negative      SARS CoV2 PCR Negative        Procedures   None    Emergency Department Course & Assessments:       Interventions:  Medications   furosemide (LASIX) injection 20 mg (20 mg Intravenous $Given 2/8/24 5119)     Independent Interpretation (X-rays, CTs, rhythm strip):  Cxr, no ptx    Assessments/Consultations/Discussion of Management or Tests:  ED Course as of 02/08/24 0617   u Feb 08, 2024 0318 I obtained history and examined the patient as noted above.    6481 I spoke with Dr. Fournier, hospitalist, who  agreed to admit the patient.      Social Determinants of Health affecting care:  Healthcare Access/Compliance     Disposition:  The patient was admitted to the hospital under the care of Dr. Fournier.     Impression & Plan      Medical Decision Making:  Shiraz Ingram is a 89 year old male with a PMH of AFIB and asthma who presents for evaluation of shortness of breath.  I considered a broad differential of their dyspnea including CHF exacerbation, PE, dissection, hemothorax, pleural effusion, pneumonia, acute coronary syndrome, reactive airway disease, bronchitis, upper airway obstruction, foreign body, etc.  The patient complains of increased shortness of breath when laying down, lower extremity edema, diminished LL breath sounds on pulmonary exam CHF is the most likely diagnosis.    Given the history and exam plus laboratory findings I feel congestive heart failure is the most likely etiology and would not do extensive workup for other causes as mentioned above at this time.  The patient received IV diuretics in ED. Will admit at this time for further cares.  On oxygen.        Diagnosis:    ICD-10-CM    1. Acute on chronic congestive heart failure, unspecified heart failure type (H)  I50.9             Scribe Disclosure:  IJessica, am serving as a scribe at 3:16 AM on 2/8/2024 to document services personally performed by Trish Cee MD based on my observations and the provider's statements to me.  2/8/2024   Trish Cee MD Taxman, Karah M, MD  02/08/24 0852

## 2024-02-08 NOTE — PROGRESS NOTES
[facility]  ED Nurse Handoff Report    ED Chief complaint: Shortness of Breath      ED Diagnosis:   Final diagnoses:   Acute on chronic congestive heart failure, unspecified heart failure type (H)       Code Status: to be discussed with provider    Allergies: No Known Allergies    Patient Story: Patient came in with increasing shortness of breath and fluid overload.  Focused Assessment:  Patient was given lasix and is urinating frequently in the urinal. Patient reports being in the hospital in [place] recently without much success. Patient was instructed by a provider in [place] to stop taking his Torsemide.    Treatments and/or interventions provided: Labs drawn, imaging done and medications given.  Patient's response to treatments and/or interventions: Patient tolerated interventions well.     To be done/followed up on inpatient unit:  Follow plan of care.    Does this patient have any cognitive concerns?: Short term memory loss    Activity level - Baseline/Home:  Independent  Activity Level - Current:   Stand with Assist    Patient's Preferred language: American Sign Language   Needed?: Yes    Isolation: None  Infection: Not Applicable  Patient tested for COVID 19 prior to admission: NO  Bariatric?: No    Vital Signs:   Vitals:    02/08/24 0800 02/08/24 0900 02/08/24 1000 02/08/24 1100   BP: (!) 174/78 (!) 169/70 (!) 140/75 (!) 176/69   BP Location:       Patient Position:       Cuff Size:       Pulse: 54 50 58 (!) 228   Resp:       Temp:       TempSrc:       SpO2: 100%          Cardiac Rhythm:     Was the PSS-3 completed:   Yes  What interventions are required if any?  None needed             Family Comments: Son at bedside and supportive in cares. Son communicates with patient via ASL.  OBS brochure/video discussed/provided to patient/family: No              Name of person given brochure if not patient: n/a              Relationship to patient:     For the majority of the shift this  patient's behavior was Green.   Behavioral interventions performed were none needed.    ED NURSE PHONE NUMBER: [phone]

## 2024-02-09 ENCOUNTER — APPOINTMENT (OUTPATIENT)
Dept: OCCUPATIONAL THERAPY | Facility: CLINIC | Age: 89
DRG: 280 | End: 2024-02-09
Attending: HOSPITALIST
Payer: COMMERCIAL

## 2024-02-09 ENCOUNTER — OFFICE VISIT (OUTPATIENT)
Dept: INTERPRETER SERVICES | Facility: CLINIC | Age: 89
End: 2024-02-09
Payer: COMMERCIAL

## 2024-02-09 LAB
ANION GAP SERPL CALCULATED.3IONS-SCNC: 8 MMOL/L (ref 7–15)
BASOPHILS # BLD AUTO: 0 10E3/UL (ref 0–0.2)
BASOPHILS NFR BLD AUTO: 0 %
BUN SERPL-MCNC: 48.4 MG/DL (ref 8–23)
CALCIUM SERPL-MCNC: 9.3 MG/DL (ref 8.8–10.2)
CHLORIDE SERPL-SCNC: 102 MMOL/L (ref 98–107)
CREAT SERPL-MCNC: 2 MG/DL (ref 0.67–1.17)
DEPRECATED HCO3 PLAS-SCNC: 28 MMOL/L (ref 22–29)
EGFRCR SERPLBLD CKD-EPI 2021: 31 ML/MIN/1.73M2
EOSINOPHIL # BLD AUTO: 0.1 10E3/UL (ref 0–0.7)
EOSINOPHIL NFR BLD AUTO: 2 %
ERYTHROCYTE [DISTWIDTH] IN BLOOD BY AUTOMATED COUNT: 17.7 % (ref 10–15)
GLUCOSE SERPL-MCNC: 96 MG/DL (ref 70–99)
HCT VFR BLD AUTO: 29.3 % (ref 40–53)
HGB BLD-MCNC: 9 G/DL (ref 13.3–17.7)
IMM GRANULOCYTES # BLD: 0 10E3/UL
IMM GRANULOCYTES NFR BLD: 0 %
LYMPHOCYTES # BLD AUTO: 0.6 10E3/UL (ref 0.8–5.3)
LYMPHOCYTES NFR BLD AUTO: 17 %
MCH RBC QN AUTO: 19.8 PG (ref 26.5–33)
MCHC RBC AUTO-ENTMCNC: 30.7 G/DL (ref 31.5–36.5)
MCV RBC AUTO: 65 FL (ref 78–100)
MONOCYTES # BLD AUTO: 0.3 10E3/UL (ref 0–1.3)
MONOCYTES NFR BLD AUTO: 7 %
NEUTROPHILS # BLD AUTO: 2.6 10E3/UL (ref 1.6–8.3)
NEUTROPHILS NFR BLD AUTO: 74 %
NRBC # BLD AUTO: 0 10E3/UL
NRBC BLD AUTO-RTO: 1 /100
PLATELET # BLD AUTO: 218 10E3/UL (ref 150–450)
POTASSIUM SERPL-SCNC: 4.7 MMOL/L (ref 3.4–5.3)
RBC # BLD AUTO: 4.54 10E6/UL (ref 4.4–5.9)
SODIUM SERPL-SCNC: 138 MMOL/L (ref 135–145)
TROPONIN T SERPL HS-MCNC: 73 NG/L
WBC # BLD AUTO: 3.5 10E3/UL (ref 4–11)

## 2024-02-09 PROCEDURE — 250N000011 HC RX IP 250 OP 636: Performed by: HOSPITALIST

## 2024-02-09 PROCEDURE — 97166 OT EVAL MOD COMPLEX 45 MIN: CPT | Mod: GO | Performed by: OCCUPATIONAL THERAPIST

## 2024-02-09 PROCEDURE — 99232 SBSQ HOSP IP/OBS MODERATE 35: CPT | Performed by: PHYSICIAN ASSISTANT

## 2024-02-09 PROCEDURE — 80048 BASIC METABOLIC PNL TOTAL CA: CPT | Performed by: HOSPITALIST

## 2024-02-09 PROCEDURE — 250N000013 HC RX MED GY IP 250 OP 250 PS 637: Performed by: INTERNAL MEDICINE

## 2024-02-09 PROCEDURE — 85025 COMPLETE CBC W/AUTO DIFF WBC: CPT | Performed by: HOSPITALIST

## 2024-02-09 PROCEDURE — 250N000013 HC RX MED GY IP 250 OP 250 PS 637: Performed by: HOSPITALIST

## 2024-02-09 PROCEDURE — 999N000157 HC STATISTIC RCP TIME EA 10 MIN

## 2024-02-09 PROCEDURE — 99232 SBSQ HOSP IP/OBS MODERATE 35: CPT | Performed by: HOSPITALIST

## 2024-02-09 PROCEDURE — 94640 AIRWAY INHALATION TREATMENT: CPT

## 2024-02-09 PROCEDURE — 250N000009 HC RX 250: Performed by: INTERNAL MEDICINE

## 2024-02-09 PROCEDURE — 94640 AIRWAY INHALATION TREATMENT: CPT | Mod: 76

## 2024-02-09 PROCEDURE — 36415 COLL VENOUS BLD VENIPUNCTURE: CPT | Performed by: HOSPITALIST

## 2024-02-09 PROCEDURE — 97110 THERAPEUTIC EXERCISES: CPT | Mod: GO | Performed by: OCCUPATIONAL THERAPIST

## 2024-02-09 PROCEDURE — 84484 ASSAY OF TROPONIN QUANT: CPT | Performed by: HOSPITALIST

## 2024-02-09 PROCEDURE — T1013 SIGN LANG/ORAL INTERPRETER: HCPCS | Mod: U3

## 2024-02-09 PROCEDURE — 210N000002 HC R&B HEART CARE

## 2024-02-09 RX ORDER — FUROSEMIDE 10 MG/ML
60 INJECTION INTRAMUSCULAR; INTRAVENOUS
Status: DISCONTINUED | OUTPATIENT
Start: 2024-02-10 | End: 2024-02-10

## 2024-02-09 RX ADMIN — PANTOPRAZOLE SODIUM 40 MG: 40 TABLET, DELAYED RELEASE ORAL at 16:46

## 2024-02-09 RX ADMIN — FUROSEMIDE 60 MG: 10 INJECTION, SOLUTION INTRAMUSCULAR; INTRAVENOUS at 14:52

## 2024-02-09 RX ADMIN — PANTOPRAZOLE SODIUM 40 MG: 40 TABLET, DELAYED RELEASE ORAL at 09:02

## 2024-02-09 RX ADMIN — ASPIRIN 81 MG: 81 TABLET, COATED ORAL at 20:36

## 2024-02-09 RX ADMIN — ASPIRIN 81 MG: 81 TABLET, COATED ORAL at 09:02

## 2024-02-09 RX ADMIN — SIMVASTATIN 40 MG: 40 TABLET, FILM COATED ORAL at 20:36

## 2024-02-09 RX ADMIN — MONTELUKAST 10 MG: 10 TABLET, FILM COATED ORAL at 09:02

## 2024-02-09 RX ADMIN — DABIGATRAN ETEXILATE MESYLATE 75 MG: 75 CAPSULE ORAL at 09:02

## 2024-02-09 RX ADMIN — BUDESONIDE 0.5 MG: 0.5 INHALANT RESPIRATORY (INHALATION) at 20:08

## 2024-02-09 RX ADMIN — IPRATROPIUM BROMIDE AND ALBUTEROL SULFATE 3 ML: .5; 3 SOLUTION RESPIRATORY (INHALATION) at 17:29

## 2024-02-09 RX ADMIN — HYDRALAZINE HYDROCHLORIDE 25 MG: 25 TABLET, FILM COATED ORAL at 09:02

## 2024-02-09 RX ADMIN — IPRATROPIUM BROMIDE AND ALBUTEROL SULFATE 3 ML: .5; 3 SOLUTION RESPIRATORY (INHALATION) at 13:14

## 2024-02-09 RX ADMIN — MAGNESIUM 64 MG (MAGNESIUM CHLORIDE) TABLET,DELAYED RELEASE 535 MG: at 09:02

## 2024-02-09 RX ADMIN — IPRATROPIUM BROMIDE AND ALBUTEROL SULFATE 3 ML: .5; 3 SOLUTION RESPIRATORY (INHALATION) at 20:08

## 2024-02-09 RX ADMIN — MAGNESIUM 64 MG (MAGNESIUM CHLORIDE) TABLET,DELAYED RELEASE 535 MG: at 20:36

## 2024-02-09 RX ADMIN — ACETAMINOPHEN 650 MG: 325 TABLET, FILM COATED ORAL at 13:13

## 2024-02-09 RX ADMIN — HYDRALAZINE HYDROCHLORIDE 25 MG: 25 TABLET, FILM COATED ORAL at 20:36

## 2024-02-09 RX ADMIN — ISOSORBIDE MONONITRATE 60 MG: 60 TABLET, EXTENDED RELEASE ORAL at 09:02

## 2024-02-09 RX ADMIN — IPRATROPIUM BROMIDE AND ALBUTEROL SULFATE 3 ML: .5; 3 SOLUTION RESPIRATORY (INHALATION) at 07:16

## 2024-02-09 RX ADMIN — METOPROLOL TARTRATE 25 MG: 25 TABLET, FILM COATED ORAL at 20:36

## 2024-02-09 RX ADMIN — HYDRALAZINE HYDROCHLORIDE 25 MG: 25 TABLET, FILM COATED ORAL at 13:13

## 2024-02-09 RX ADMIN — IPRATROPIUM BROMIDE AND ALBUTEROL SULFATE 3 ML: .5; 3 SOLUTION RESPIRATORY (INHALATION) at 04:48

## 2024-02-09 RX ADMIN — DABIGATRAN ETEXILATE MESYLATE 75 MG: 75 CAPSULE ORAL at 20:36

## 2024-02-09 RX ADMIN — FUROSEMIDE 60 MG: 10 INJECTION, SOLUTION INTRAMUSCULAR; INTRAVENOUS at 06:08

## 2024-02-09 RX ADMIN — ACETAMINOPHEN 650 MG: 325 TABLET, FILM COATED ORAL at 01:04

## 2024-02-09 RX ADMIN — METOPROLOL TARTRATE 25 MG: 25 TABLET, FILM COATED ORAL at 09:02

## 2024-02-09 RX ADMIN — BUDESONIDE 0.5 MG: 0.5 INHALANT RESPIRATORY (INHALATION) at 07:16

## 2024-02-09 ASSESSMENT — ACTIVITIES OF DAILY LIVING (ADL)
ADLS_ACUITY_SCORE: 38
PREVIOUS_RESPONSIBILITIES: MEAL PREP;HOUSEKEEPING;LAUNDRY;SHOPPING;MEDICATION MANAGEMENT;FINANCES;DRIVING;WORK
ADLS_ACUITY_SCORE: 38

## 2024-02-09 NOTE — PROGRESS NOTES
[facility]    Hospitalist Progress Note    Date of Admission:  2/8/2024    Assessment & Plan   Shiraz Ingram is a markedly pleasant 89 year old gentleman with past medical history that is most notable for CAD, chronic systolic CHF, prior TAVR, prior pacemaker placement, chronic atrial fibrillation on DOAC, and CKD Stage 3, among others; who presents with dyspnea and bilateral lower extremity edema, and is found to have suspected acute CHF exacerbation causing acute pulmonary edema.     PLAN      Suspected acute CHF exacerbation causing acute pulmonary edema: Of note, Mr. Ingram is followed by [facility] Cardiology for CAD, status post prior CABG, as well as chronic atrial fibrillation, aortic stenosis status post TAVR, and chronic biventricular CHF with reduced EF. Last TTE from 6/2022 showed LVEF 45-50%, mild pulmonary hypertension, and 9 mmHg bioprosthetic valve gradient. At his last visit with Cardiology in 7/2023 he was noted to be feeling well, on Torsemide 10-15 mg daily.  Most recently, it seems he was hospitalized in [place], [place], for dyspnea. There is an incomplete paper chart with him from that hospitalization that shows Cr 2.00 and report of mild myonecrosis, negative COVID and Influenza testing, CXR that showed pulmonary vascular congestion. His son says he was treated for 3 days and told to follow up with his Cardiology team here at discharge to determine if his Torsemide dose should be changed. He was discharged 1/22/24. Unclear if he has been taking torsemide.     Now, he presents for evaluation of recurrent acute dyspnea and worsening bilateral lower extremity edema . In the ED, he is afebrile, not hypoxic at rest. He has irregular bradycardia and accelerated hypertension. He has chronic leukopenia and anemia as discussed below. Pro-BNP is 6643, and had been 4099 on 12/25/23. EKG confirms atrial fibrillation with slow VR and intermittent pacing. Troponin T is 59, unchanged from  "recent previous Troponin checks. CXR shows interstitial pulmonary edema. Overall, his symptoms are consistent with acute CHF exacerbation causing acute pulmonary edema. His myonecrosis seems to be chronic, in the setting of CKD. ACS ruled out as serial troponins remained flat.(59>73).  Acute CHF exacerbation could be due to uncontrolled HTN.       -- Inpatient. Telemetry, TTE ordered. Cardiology consulted. Diurese with Lasix IV (dose reduced to 60 bid on 2/9),  Strict I and O's, daily weights, and low sodium diet ordered.  --Repeat TTE on 2/8 showed EF 55-60%, mildly dilated RV, moderately reduced RV systolic function, normal prosthetic aortic valve gradient, RV systolic pressure elevated consistent with moderate pulmonary hypertension.    --Creatinine 2 on 2/9.  --Intermittently on 1 to 2 L nasal cannula oxygen more for patient's symptoms. Not on O2 on 2/9. Feeling much improved with breathing.  --He will need close cardiology follow-up for diuretic titration. Suspect transition to p.o. diuretic tomorrow (2/10).     Stage 3 CKD: Noted. He could have cardiorenal syndrome. Monitor closely while hospitalized and diuresing.   Creat 1.7 on 2/8.  2 on 2/9.      CAD: He is status post CABG in 2002 [LIMA -LAD, VG-OM and the RCA]. Most recent cardiac catheterization 4/2020 reportedly revealed: \"Moderate-severe touchdown lesion of the right PDA branch of the IHQ-[id number]-bKK Y-graft. Otherwise widely patent LIMA-LAD, SVG-OM, and rPL Y-graft branch.\"     -- Resumed home ASA.     Accelerated hypertension: Without clear signs of hypertensive crisis.  SBP in the 190s at admission.  Currently much better controlled and in normal range.    -- Resumed home Hydralazine, Imdur, and Metoprolol.  Now also on IV diuresis.     Hyperlipidemia: Resumed home Zocor.     Chronic atrial fibrillation and MAYA thrombus: Currently bradycardic. He has a pacemaker in place as discussed below.    -- Resumed home Pradaxa.  Dose reduced based on renal " function.     Aortic stenosis status post TAVR: 29mm Medtronic Evolut Pro, implanted  7/2020. That was complicated by heart block, leading to placement of permanent pacemaker as well. Noted     Chronic anemia and leukopenia: He is followed by Dr. Morris of Hematology for known lung nodules and renal masses, as well as chronic pancytopenia, which is being monitored serially. MDS has been thought possible, and if it worsens, the plan has been to consider bone marrow biopsy.  Currently counts appear to be at baseline.    -- Monitor CBC while hospitalized; resume home iron supplementation at discharge         PUD: He was seen 5/2020 by Dr. Fournier of GI when hospitalized for acute upper GI bleeding, and EGD showed a bleeding esophageal ulcer that was cauterized.     -- Resume home BID PPI      Asthma, moderate persistent: By history: No signs of wheezing at present. Resume home inhalers and Singulair      History of SBO due to incarcerated hernia: Status post open surgical repair in 1/2023. Noted           Medical Decision Making       Please see A&P for additional details of medical decision making.        Clinically Significant Risk Factors                  # Hypertension: Noted on problem list  # Acute heart failure with preserved ejection fraction: heart failure noted on problem list, last echo with EF >50%, and receiving IV diuretics           # Asthma: noted on problem list  # Pacemaker present         Regi Gonzalez MD  Text Page (7am - 6pm, M-F)  [facility]  Securely message with the Vocera Web Console (learn more here)  Text page via Wear[employer] Paging/Directory      Interval History   Stable overnight.  Patient feeling much improved this morning with his breathing.  He was sitting up in bed.  Used  to communicate with him.  He denied any pain anywhere.  No fevers or chills.    -Data reviewed today: I reviewed all new labs and imaging results over the last 24 hours. I  personally reviewed CXR with result as noted above    Physical Exam   Temp: 97.8  F (36.6  C) Temp src: Oral BP: 122/52 Pulse: 58   Resp: 18 SpO2: 97 % O2 Device: Nasal cannula Oxygen Delivery: 2 LPM  Vitals:    02/08/24 1218 02/09/24 0450   Weight: 70.9 kg (156 lb 6.4 oz) 69 kg (152 lb 1.6 oz)     Vital Signs with Ranges  Temp:  [97.7  F (36.5  C)-98.6  F (37  C)] 97.8  F (36.6  C)  Pulse:  [] 58  Resp:  [18] 18  BP: (110-199)/(52-91) 122/52  SpO2:  [96 %-100 %] 97 %  I/O last 3 completed shifts:  In: 240 [P.O.:240]  Out: 2150 [Urine:2150]    Constitutional: Alert, appears comfortable, in no acute distress, calm and cooperative  Respiratory: Non labored breathing, faint crackles in bases, no wheezes  Cardiovascular: Regular, bilateral 2/3+ lower extremity edema  GI: Abdomen is soft, non distended, non tender. Normal BS  Skin/Integumen: no rashes, no pressure sores  Neuro: alert, follows commands, moving all extremities, no facial asymmetry  Psych: mood and affect appropriate      Medications      aspirin  81 mg Oral BID    budesonide  0.5 mg Nebulization BID    dabigatran ANTICOAGULANT  75 mg Oral BID    furosemide  60 mg Intravenous Q8H    hydrALAZINE  25 mg Oral TID    isosorbide mononitrate  60 mg Oral Daily    magnesium chloride  535 mg Oral BID    metoprolol tartrate  25 mg Oral BID    montelukast  10 mg Oral Daily    pantoprazole  40 mg Oral BID AC    simvastatin  40 mg Oral At Bedtime    sodium chloride (PF)  3 mL Intracatheter Q8H       Data   Recent Labs   Lab 02/09/24  0527 02/08/24  0245   WBC 3.5* 3.9*   HGB 9.0* 9.4*   MCV 65* 64*    229    139   POTASSIUM 4.7 4.6   CHLORIDE 102 105   CO2 28 24   BUN 48.4* 42.7*   CR 2.00* 1.77*   ANIONGAP 8 10   AYALA 9.3 9.1   GLC 96 108*       Imaging  Recent Results (from the past 24 hour(s))   Echocardiogram Complete   Result Value    LVEF  55-60%    Narrative    [id number]  [id number]  [id number]  [id number]^RAGHAV^JESSI^HEMANTH [facility]  [facility]  Echocardiography Laboratory  [address] [place] [id number]     Name: SRIKANTH OBANDO  MRN: [id number]  : 1934  Study Date: 2024 11:13 AM  Age: 89 yrs  Gender: Male  Patient Location: [facility]  Reason For Study: Heart Failure  Ordering Physician: JESSI AVILEZ  Referring Physician: JESSI AVILEZ  Performed By: Nimco Alves     BSA: 2.6 m2  Height: 111 in  Weight: 153 lb  HR: 53  BP: 190/86 mmHg  ______________________________________________________________________________  Procedure  Complete Echo Adult. Optison (NDC #[id number]) given intravenously.  ______________________________________________________________________________  Interpretation Summary     The visual ejection fraction is 55-60%.  Left ventricular systolic function is normal.  The right ventricle is mildly dilated.  The right ventricular systolic function is moderately reduced.  29mm Medtronic Evolut Pro TAVR valve in the aortic position.,  The gradient is normal for this prosthetic aortic valve.  Right ventricular systolic pressure is elevated, consistent with moderate  pulmonary hypertension.  The rhythm was atrial fibrillation.  The study was technically difficult. The study was technically limited.  ______________________________________________________________________________  Left Ventricle  The left ventricle is normal in size. There is normal left ventricular wall  thickness. Diastolic Doppler findings (E/E' ratio and/or other parameters)  suggest left ventricular filling pressures are increased. The visual ejection  fraction is 55-60%. Left ventricular systolic function is normal. Septal wall  motion abnormality may reflect pacemaker activation.     Right Ventricle  There is a catheter/pacemaker lead seen in the right ventricle. The right  ventricle is mildly dilated. The right ventricular systolic function is  moderately reduced.     Atria  The left atrium is moderately dilated. The right  atrium is moderate to  severely dilated. There is no color Doppler evidence of an atrial shunt.     Mitral Valve  There is moderate mitral annular calcification. There is trace mitral  regurgitation.     Tricuspid Valve  There is mild (1+) tricuspid regurgitation. The right ventricular systolic  pressure is approximated at 44.1 mmHg plus the right atrial pressure. IVC  diameter >2.1 cm collapsing <50% with sniff suggests a high RA pressure  estimated at 15 mmHg or greater. Right ventricular systolic pressure is  elevated, consistent with moderate pulmonary hypertension.     Aortic Valve  No aortic regurgitation is present. The peak AoV pressure gradient is 8.0  mmHg. The mean AoV pressure gradient is 4.6 mmHg. 29mm Medtronic Evolut Pro  TAVR valve in the aortic position.,. The gradient is normal for this  prosthetic aortic valve.     Pulmonic Valve  There is no pulmonic valvular regurgitation.     Vessels  Normal size ascending aorta. Dilation of the inferior vena cava is present  with abnormal respiratory variation in diameter. IVC diameter >2.1 cm  collapsing <50% with sniff suggests a high RA pressure estimated at 15 mmHg or  greater.     Pericardium  There is no pericardial effusion.     Rhythm  The rhythm was atrial fibrillation.  ______________________________________________________________________________  MMode/2D Measurements & Calculations  IVSd: 0.94 cm     LVIDd: 5.6 cm  LVIDs: 3.9 cm  LVPWd: 0.91 cm  FS: 30.7 %  LV mass(C)d: 199.0 grams  LV mass(C)dI: 76.5 grams/m2  LA dimension: 5.0 cm  asc Aorta Diam: 3.6 cm  Asc Ao diam index BSA (cm/m2): 1.4  Asc Ao diam index Ht(cm/m): 1.3  RV Base: 4.6 cm  RWT: 0.32  TAPSE: 1.2 cm     Doppler Measurements & Calculations  MV E max ella: 147.0 cm/sec  MV A max ella: 44.0 cm/sec  MV E/A: 3.3  MV dec slope: 930.8 cm/sec2  MV dec time: 0.16 sec  Ao V2 max: 145.0 cm/sec  Ao max P.0 mmHg  Ao V2 mean: 95.2 cm/sec  Ao mean P.6 mmHg  Ao V2 VTI: 33.2 cm  Ao acc time:  0.07 sec     LV V1 max P.8 mmHg  LV V1 max: 83.6 cm/sec  LV V1 VTI: 19.5 cm  PA acc time: 0.10 sec  TR max vishnu: 331.9 cm/sec  TR max P.1 mmHg  AV Vishnu Ratio (DI): 0.58  E/E' av.4  Lateral E/e': 12.7  Medial E/e': 26.0  RV S Vishnu: 8.9 cm/sec     ______________________________________________________________________________  Report approved by: Billie Montez 2024 12:43 PM

## 2024-02-09 NOTE — PLAN OF CARE
Status: Pt admitted for SOB, CHF exacerbation  Vitals: Hypertensive, given IV hydralazine with minimal relief, systolic below goal of 180, on 2L NC, tele: V-paced, (HR 50s)  Neuros: AOx4. Very [place], requires   IV: PIV SL  Labs/Electrolytes: Cr: 1.77, BNP: 6,643, Trop: 59  Resp/trach: LS clear, diminished in LL, given prn duoneb with some relief  Diet: 2g NA  Bowel status: LBM 2/7  : Voiding in urinal  Skin: BLE +3/+4 pitting edema - IV lasix given, otherwise blanchable redness to coccyx  Pain: Denies  Activity: Up A1, gb, walker  Social: No visitors this shift  Plan: Continue to diuresis, echo done, control bp, continue to monitor.

## 2024-02-09 NOTE — CONSULTS
REASON FOR ASSESSMENT:  CHF Consult for 2 gm NA Diet Education    CURRENT DIET:  2 g Na diet     NUTRITION DIAGNOSIS:  Food- and nutrition-related knowledge deficit R/t low sodium diet as evidenced by need for low sodium diet education.       INTERVENTIONS:    Nutrition Prescription:  2 g Na diet     Implementation:  Assessed learning needs, learning preferences, and willingness to learn  Nutrition Education (Content):  Provided handouts:  Tips for Low Na Diet  Low Na Foods/Drinks  Seasoning Your Food Without Salt  Discussed rational for limiting Na for CHF and stressed importance of following 2 gm Na guidelines   Encouraged patient to keep a daily food record  Nutrition Education (Application):  Discussed current eating habits and recommended alternative food choices  Anticipated good compliance  Diet Education - refer to Education Flowsheet    Goals:  Patient verbalizes understanding of diet   All of the above goals met during the education session    Follow Up:  Provided RD contact information for future questions.  Recommend Out-Patient Nutrition Referral, if further diet instructions are needed.    Maddie Redmond, MS, RD, LD  Clinical Dietitian   Pager: [phone]

## 2024-02-09 NOTE — PLAN OF CARE
Orientation: Alert and oriented, requires     Vitals/Tele: V-Paced in 50s. On oxygen 1 litre NC overnight per patient request. PRN Nebs administered x1, other vitals WNL    IV Access/drains: PIV saline locked, scheduled lasix for diuresis    Diet: 2 gm sodium    Mobility: x1 assist with gait belt and walker    GI/: to bathroom, used urinal for I&O. Bmx1 overnight    Wound/Skin: Edema +3 to +4 yane lower extremities.     Consults: Cardiology    Discharge Plan: TBD      See Flow sheets for assessment     Keith Bass RN on 2/9/2024 at 6:29 AM

## 2024-02-09 NOTE — PROGRESS NOTES
[facility]  Cardiology Progress Note    Date of Service (when I saw the patient): 02/09/2024  Primary Cardiologist: Dr. Field       Interval History:   No shortness of breath or orthopnea.  Continues to have significant peripheral edema but this is partially better.  Uses .    ----------------------------------------------------------------------------------------    Assessment:  Shiraz Ingram is a 89 year old male who was admitted on 2/8/2024 with worsening shortness of breath and peripheral edema.    #Acute on chronic HFmEF, possibly in the setting of holding diuretic on recent discharge and dietary indiscretion (recent trip)  - Initiated on IV Lasix 60 mg every 8 hours  - PTA regimen includes torsemide 10 mg daily, unclear if he was taking this  - Diuresing well but renal function is slightly worse today  - Shortness of breath improved but continues to have significant peripheral edema    #Acute on CKD, stage III  - Slight rise in creatinine with IV diuresis    #CAD  - History of CABG in 2002 (LIMA to LAD, SVG to OM, and to RCA)  - Coronary angiogram in 2020 showed moderate to severe touchdown lesion of right PDA branch of SVG to RPDA to RPL Y graft; otherwise widely patent LIMA and SVG to OM  - On aspirin and statin therapy  - Currently reports no angina symptoms    #S/p TAVR in 2020 with Medtronic evolute  - Most recent echo shows stable function    #S/p PPM following TAVR procedure    #Hyperlipidemia  -At goal with statin therapy    #Hypertension  - Suboptimal on current admission    #Chronic atrial fibrillation  - On Pradaxa    #PUD  -Stable on PPI    #Chronic anemia, follows with hematology with history of chronic pancytopenia    ----------------------------------------------------------------------------------------    Plan:  -Reduce IV Lasix 60 mg to twice daily  - Suspect transition to p.o. diuretic tomorrow (2/10)  - He will need close cardiology follow-up for  diuretic titration  - We discussed importance of low-sodium diet and checking weights daily  - Cardiology will follow    ----------------------------------------------------------------------------------------  Physical Exam   Temp: 97.8  F (36.6  C) Temp src: Oral BP: (!) 144/77 Pulse: 75   Resp: 18 SpO2: 97 % O2 Device: Nasal cannula Oxygen Delivery: 2 LPM  Vitals:    02/08/24 1218 02/09/24 0450   Weight: 70.9 kg (156 lb 6.4 oz) 69 kg (152 lb 1.6 oz)     GEN: NAD.   present at bedside.  Oxygen on room air.  Sitting comfortably.  HEENT: Mucous membranes moist.  NECK: Elevated JVP at 90 degree angle  C/V:  Regular rate and rhythm; 2/6 systolic murmur auscultated along LSB.  RESP: Clear to auscultation bilaterally  GI: Abdomen soft, nontender, nondistended.    EXTREM: 3+ pitting edema LE edema.   NEURO: Alert and oriented, cooperative.   PSYCH: Normal affect.  SKIN: Warm and dry.   VASC: 2+ radial and dorsalis pedis pulses bilaterally.      Medications      aspirin  81 mg Oral BID    budesonide  0.5 mg Nebulization BID    dabigatran ANTICOAGULANT  75 mg Oral BID    furosemide  60 mg Intravenous Q8H    hydrALAZINE  25 mg Oral TID    isosorbide mononitrate  60 mg Oral Daily    magnesium chloride  535 mg Oral BID    metoprolol tartrate  25 mg Oral BID    montelukast  10 mg Oral Daily    pantoprazole  40 mg Oral BID AC    simvastatin  40 mg Oral At Bedtime    sodium chloride (PF)  3 mL Intracatheter Q8H       Data   Reviewed.      Adam Mcgarry PA-C   2/9/2024  Pager: [phone]

## 2024-02-09 NOTE — DISCHARGE INSTRUCTIONS
Please call [facility] -general cardiology nurse with any concerns:  Monday-Friday 8:00 AM to 4:30 PM   General Line: [phone]   After hours:  [phone]    -- Please start weighing self daily and write in wt log chart to bring into your cardiology/CORE clinic follow up appts.   -- Please bring in current medication bottles to cardiology/CORE appts for review.   -- Call RN with weight gain greater than 2 lbs overnight, and/or 5 lbs in a week, and/or worsening shortness of breath/edema/abdominal bloating.

## 2024-02-09 NOTE — PROGRESS NOTES
02/09/24 0904   Appointment Info   Signing Clinician's Name / Credentials (OT) DIMITRI Galarza   Student Supervision On-site supervision provided;Direct supervision provided;Line of sight supervision provided;Direct Patient Contact Provided;Therapy services provided with the co-signing licensed therapist guiding and directing the services, and providing the skilled judgement and assessment throughout the session       Present yes   Language ASL   Living Environment   People in Home child(perla), adult   Current Living Arrangements house   Home Accessibility stairs to enter home;stairs within home   Number of Stairs, Main Entrance 3   Number of Stairs, Within Home, Primary greater than 10 stairs   Stair Railings, Within Home, Primary   (currently remodeling- has one for now but will have a permanent one soon.)   Transportation Anticipated family or friend will provide   Living Environment Comments Pt lives in 2 story house with son and sons girlfriend. pt lives on the main floor. Does not go to upper level. IND   Self-Care   Regular Exercise Yes   Activity/Exercise Type walking   Exercise Amount/Frequency daily   Equipment Currently Used at Home grab bar, tub/shower   Fall history within last six months no   Activity/Exercise/Self-Care Comment Pt has tub shower with grab bars, standard toilet with sink on L side. Does not use AD for ambulation at baseline.   Instrumental Activities of Daily Living (IADL)   Previous Responsibilities meal prep;housekeeping;laundry;shopping;medication management;finances;driving;work   IADL Comments IND in IADLs at baseline. Works part time doing furniture upholstery- able to manage schedule as needed/tolerated.   General Information   Onset of Illness/Injury or Date of Surgery 02/08/24   Referring Physician Dennis Fournier MD   Patient/Family Therapy Goal Statement (OT) go home   Additional Occupational Profile Info/Pertinent History of Current Problem  Shiraz Ingram is a markedly pleasant 89 year old gentleman with past medical history that is most notable for CAD, chronic systolic CHF, prior TAVR, prior pacemaker placement, chronic atrial fibrillation on DOAC, and CKD Stage 3, among others; who presents with dyspnea and bilateral lower extremity edema, and is found to have suspected acute CHF exacerbation causing acute pulmonary edema.   Existing Precautions/Restrictions fall;pacemaker   Cognitive Status Examination   Orientation Status orientation to person, place and time   Affect/Mental Status (Cognitive) WFL   Follows Commands WFL   Visual Perception   Visual Impairment/Limitations corrective lenses full-time   Sensory   Sensory Comments denies numbness/tingling   Pain Assessment   Patient Currently in Pain No   Range of Motion Comprehensive   Comment, General Range of Motion B UE and B LE ROM WFL   Strength Comprehensive (MMT)   Comment, General Manual Muscle Testing (MMT) Assessment strength WFL   Bed Mobility   Supine-Sit [place] (Bed Mobility) supervision   Transfer Skill: Bed to Chair/Chair to Bed   Bed-Chair [place] (Transfers) contact guard   Assistive Device (Bed-Chair Transfers) rolling walker   Sit-Stand Transfer   Sit-Stand [place] (Transfers) contact guard   Assistive Device (Sit-Stand Transfers) walker, front-wheeled   Lower Body Dressing Assessment/Training   [place] Level (Lower Body Dressing) lower body dressing skills;doff;don;socks;supervision   Position (Lower Body Dressing) edge of bed sitting   Clinical Impression   Criteria for Skilled Therapeutic Interventions Met (OT) Yes, treatment indicated   OT Diagnosis Impairments in ADLs and functional mobility.   OT Problem List-Impairments impacting ADL problems related to;activity tolerance impaired;mobility   Assessment of Occupational Performance 1-3 Performance Deficits   Identified Performance Deficits Impairments in functional mobility, functional transfers, community  mobility, etc.   Planned Therapy Interventions (OT) ADL retraining;transfer training;home program guidelines;progressive activity/exercise;risk factor education   Clinical Decision Making Complexity (OT) detailed assessment/moderate complexity   Risk & Benefits of therapy have been explained evaluation/treatment results reviewed;care plan/treatment goals reviewed;risks/benefits reviewed;current/potential barriers reviewed;participants voiced agreement with care plan;participants included;patient   OT Total Evaluation Time   OT Eval, Moderate Complexity Minutes (00162) 20   OT Goals   Therapy Frequency (OT) Daily   OT Predicted Duration/Target Date for Goal Attainment 02/16/24   OT: Hygiene/Grooming modified independent;while standing;within precautions;using adaptive equipment   OT: Toilet Transfer/Toileting Modified independent;cleaning and garment management;using adaptive equipment;within precautions   OT: Perform aerobic activity with stable cardiovascular response continuous activity;10 minutes;ambulation   OT: Understanding of cardiac education to maximize quality of life, condition management, and health outcomes Patient;Verbalize  (CHF ed)   OT: Navigation of stairs simulating home set up with stable cardiovascular response in order to return to home and community environment 5 stairs;Supervision/SBA   Therapeutic Procedures/Exercise   Therapeutic Procedure: strength, endurance, ROM, flexibillity minutes (58404) 30   Symptoms Noted During/After Treatment significant change in vital signs   Treatment Detail/Skilled Intervention Pt met supine in bed, agreeable to OT session.  Pt supine to sit with SBA. OT provided CHF education (see below for details). Pt verbalized understanding. Doctor came in during session to assess swelling and symptoms. Pt sit<>stand with SBA and FWW, VC for technique (push up from bed). Pt set up in chair with all needs met, on room air, prior to leaving. Chair alarm on.   Ambulation    Duration (minutes) 3 minutes   Effort Scale 3   Symptoms Denies symptoms   Cardiovascular Response Hypertension at rest   Vital Signs Details drop in O2 to 76 with ambulation and room air, see VS flowsheet for details   Cardiac Education   Education Provided Daily weights;Diet;Diagnosis;Energy conservation;OMNI Scale;Home exercise program;Signs and symptoms;Stop light tool   Education Packet Given to Patient Yes   All Patient Education Handouts Reviewed with Patient and/or Family No   OT Discharge Planning   OT Plan g/h standing at sink, toilet transfer or  timed ambulation (monitor O2 sats during ambulation /HR/BP) cont ed  and teach back of CHF ed.   OT Discharge Recommendation (DC Rec) home with assist   OT Rationale for DC Rec Pt below baseline limited by activity tolerance, desaturated O2 with ambulation, and impaired funcitonal mobility. Pt motivated in therapy and has supportive family. Pt lives with son who is able to A with I/ADLs as needed. Recommend home with initial A with functional transfers, community mobility, and functional mobility.   OT Brief overview of current status SBA supine to sit; SBA sit<>stand; SBA ambulation with FWW- drop in O2 sats RA, needs supplemental O2 with ambulation.   Total Session Time   Timed Code Treatment Minutes 30   Total Session Time (sum of timed and untimed services) 50

## 2024-02-09 NOTE — CONSULTS
[facility]    Received CORE Clinic Consult from Dr. Fournier.    Reviewed Shiraz's chart and note they are admitted for Dyspnea, BLE edema, CHF and acute pulmonary edema. Echocardiogram on 2/8/24 shows LVEF 55-60%.  This is their 1st admission(s) in the past year for concerns of heart failure.    Given above information, Shiraz meets criteria for general cardiology as patients EF is >40%.     Please have inpatient nursing provide bedside heart failure education.     Shiraz has follow-up scheduled with his Primary Cardiologist on 2/20/24. He should keep this appt.     Future Appointments   Date Time Provider Department Center   2/10/2024  8:00 AM Adina Baig [facility]   2/10/2024 11:00 AM Aye Vu OT [facility]   2/10/2024  2:00 PM OPEN, ASSIGNMENTS [facility]   2/11/2024  8:00 AM Renetta Baker [facility]   2/11/2024  2:00 PM ASL IS [facility]   2/12/2024  8:00 AM OPEN, ASSIGNMENTS [facility]   2/12/2024  2:00 PM ASL IS [facility]   2/12/2024  4:00 PM Po Uriostegui [facility]   2/20/2024  7:35 AM Jaxon Field MD [facility]   2/20/2024  8:15 AM VITO BRUNNER [facility]   5/1/2024  1:00 PM Dany Rodriguez MD [facility]       Please call with any further questions.    Kay Ramos, RN, [facility] [place], [place]  [facility] Care Coordinator  [phone]

## 2024-02-10 ENCOUNTER — OFFICE VISIT (OUTPATIENT)
Dept: INTERPRETER SERVICES | Facility: CLINIC | Age: 89
End: 2024-02-10
Payer: COMMERCIAL

## 2024-02-10 ENCOUNTER — APPOINTMENT (OUTPATIENT)
Dept: OCCUPATIONAL THERAPY | Facility: CLINIC | Age: 89
DRG: 280 | End: 2024-02-10
Payer: COMMERCIAL

## 2024-02-10 LAB
ANION GAP SERPL CALCULATED.3IONS-SCNC: 9 MMOL/L (ref 7–15)
BUN SERPL-MCNC: 50 MG/DL (ref 8–23)
CALCIUM SERPL-MCNC: 9 MG/DL (ref 8.8–10.2)
CHLORIDE SERPL-SCNC: 100 MMOL/L (ref 98–107)
CREAT SERPL-MCNC: 1.94 MG/DL (ref 0.67–1.17)
DEPRECATED HCO3 PLAS-SCNC: 29 MMOL/L (ref 22–29)
EGFRCR SERPLBLD CKD-EPI 2021: 32 ML/MIN/1.73M2
ERYTHROCYTE [DISTWIDTH] IN BLOOD BY AUTOMATED COUNT: 17.7 % (ref 10–15)
GLUCOSE SERPL-MCNC: 92 MG/DL (ref 70–99)
HCT VFR BLD AUTO: 28.9 % (ref 40–53)
HGB BLD-MCNC: 8.7 G/DL (ref 13.3–17.7)
MCH RBC QN AUTO: 19.2 PG (ref 26.5–33)
MCHC RBC AUTO-ENTMCNC: 30.1 G/DL (ref 31.5–36.5)
MCV RBC AUTO: 64 FL (ref 78–100)
PLATELET # BLD AUTO: 211 10E3/UL (ref 150–450)
POTASSIUM SERPL-SCNC: 4.9 MMOL/L (ref 3.4–5.3)
RBC # BLD AUTO: 4.54 10E6/UL (ref 4.4–5.9)
SODIUM SERPL-SCNC: 138 MMOL/L (ref 135–145)
WBC # BLD AUTO: 3.5 10E3/UL (ref 4–11)

## 2024-02-10 PROCEDURE — 85027 COMPLETE CBC AUTOMATED: CPT | Performed by: HOSPITALIST

## 2024-02-10 PROCEDURE — 250N000011 HC RX IP 250 OP 636: Performed by: HOSPITALIST

## 2024-02-10 PROCEDURE — 250N000013 HC RX MED GY IP 250 OP 250 PS 637: Performed by: INTERNAL MEDICINE

## 2024-02-10 PROCEDURE — 210N000002 HC R&B HEART CARE

## 2024-02-10 PROCEDURE — 250N000013 HC RX MED GY IP 250 OP 250 PS 637: Performed by: HOSPITALIST

## 2024-02-10 PROCEDURE — 97535 SELF CARE MNGMENT TRAINING: CPT | Mod: GO | Performed by: OCCUPATIONAL THERAPIST

## 2024-02-10 PROCEDURE — 999N000157 HC STATISTIC RCP TIME EA 10 MIN

## 2024-02-10 PROCEDURE — 36415 COLL VENOUS BLD VENIPUNCTURE: CPT | Performed by: HOSPITALIST

## 2024-02-10 PROCEDURE — 80048 BASIC METABOLIC PNL TOTAL CA: CPT | Performed by: HOSPITALIST

## 2024-02-10 PROCEDURE — 94640 AIRWAY INHALATION TREATMENT: CPT | Mod: 76

## 2024-02-10 PROCEDURE — 250N000009 HC RX 250: Performed by: INTERNAL MEDICINE

## 2024-02-10 PROCEDURE — 99232 SBSQ HOSP IP/OBS MODERATE 35: CPT | Performed by: INTERNAL MEDICINE

## 2024-02-10 PROCEDURE — 99232 SBSQ HOSP IP/OBS MODERATE 35: CPT | Performed by: HOSPITALIST

## 2024-02-10 PROCEDURE — T1013 SIGN LANG/ORAL INTERPRETER: HCPCS | Mod: U3

## 2024-02-10 RX ORDER — TORSEMIDE 10 MG/1
10 TABLET ORAL
Status: DISCONTINUED | OUTPATIENT
Start: 2024-02-10 | End: 2024-02-11

## 2024-02-10 RX ADMIN — DABIGATRAN ETEXILATE MESYLATE 75 MG: 75 CAPSULE ORAL at 20:25

## 2024-02-10 RX ADMIN — PANTOPRAZOLE SODIUM 40 MG: 40 TABLET, DELAYED RELEASE ORAL at 15:43

## 2024-02-10 RX ADMIN — IPRATROPIUM BROMIDE AND ALBUTEROL SULFATE 3 ML: .5; 3 SOLUTION RESPIRATORY (INHALATION) at 17:45

## 2024-02-10 RX ADMIN — ACETAMINOPHEN 650 MG: 325 TABLET, FILM COATED ORAL at 10:29

## 2024-02-10 RX ADMIN — MONTELUKAST 10 MG: 10 TABLET, FILM COATED ORAL at 08:18

## 2024-02-10 RX ADMIN — MAGNESIUM 64 MG (MAGNESIUM CHLORIDE) TABLET,DELAYED RELEASE 535 MG: at 20:25

## 2024-02-10 RX ADMIN — HYDRALAZINE HYDROCHLORIDE 25 MG: 25 TABLET, FILM COATED ORAL at 14:37

## 2024-02-10 RX ADMIN — IPRATROPIUM BROMIDE AND ALBUTEROL SULFATE 3 ML: .5; 3 SOLUTION RESPIRATORY (INHALATION) at 22:02

## 2024-02-10 RX ADMIN — BUDESONIDE 0.5 MG: 0.5 INHALANT RESPIRATORY (INHALATION) at 20:32

## 2024-02-10 RX ADMIN — IPRATROPIUM BROMIDE AND ALBUTEROL SULFATE 3 ML: .5; 3 SOLUTION RESPIRATORY (INHALATION) at 07:33

## 2024-02-10 RX ADMIN — DABIGATRAN ETEXILATE MESYLATE 75 MG: 75 CAPSULE ORAL at 08:19

## 2024-02-10 RX ADMIN — ACETAMINOPHEN 650 MG: 325 TABLET, FILM COATED ORAL at 04:33

## 2024-02-10 RX ADMIN — PANTOPRAZOLE SODIUM 40 MG: 40 TABLET, DELAYED RELEASE ORAL at 08:18

## 2024-02-10 RX ADMIN — HYDRALAZINE HYDROCHLORIDE 25 MG: 25 TABLET, FILM COATED ORAL at 20:25

## 2024-02-10 RX ADMIN — FUROSEMIDE 60 MG: 10 INJECTION, SOLUTION INTRAMUSCULAR; INTRAVENOUS at 08:21

## 2024-02-10 RX ADMIN — HYDRALAZINE HYDROCHLORIDE 25 MG: 25 TABLET, FILM COATED ORAL at 08:18

## 2024-02-10 RX ADMIN — BUDESONIDE 0.5 MG: 0.5 INHALANT RESPIRATORY (INHALATION) at 07:33

## 2024-02-10 RX ADMIN — METOPROLOL TARTRATE 25 MG: 25 TABLET, FILM COATED ORAL at 20:25

## 2024-02-10 RX ADMIN — ISOSORBIDE MONONITRATE 60 MG: 60 TABLET, EXTENDED RELEASE ORAL at 08:19

## 2024-02-10 RX ADMIN — ACETAMINOPHEN 650 MG: 325 TABLET, FILM COATED ORAL at 14:38

## 2024-02-10 RX ADMIN — IPRATROPIUM BROMIDE AND ALBUTEROL SULFATE 3 ML: .5; 3 SOLUTION RESPIRATORY (INHALATION) at 12:44

## 2024-02-10 RX ADMIN — ASPIRIN 81 MG: 81 TABLET, COATED ORAL at 08:18

## 2024-02-10 RX ADMIN — SIMVASTATIN 40 MG: 40 TABLET, FILM COATED ORAL at 20:25

## 2024-02-10 RX ADMIN — METOPROLOL TARTRATE 25 MG: 25 TABLET, FILM COATED ORAL at 08:18

## 2024-02-10 RX ADMIN — ASPIRIN 81 MG: 81 TABLET, COATED ORAL at 20:25

## 2024-02-10 RX ADMIN — MAGNESIUM 64 MG (MAGNESIUM CHLORIDE) TABLET,DELAYED RELEASE 535 MG: at 08:18

## 2024-02-10 RX ADMIN — TORSEMIDE 10 MG: 10 TABLET ORAL at 15:43

## 2024-02-10 ASSESSMENT — ACTIVITIES OF DAILY LIVING (ADL)
ADLS_ACUITY_SCORE: 38

## 2024-02-10 NOTE — PROGRESS NOTES
[facility]    Hospitalist Progress Note    Date of Admission:  2/8/2024    Assessment & Plan   Shiraz Ingram is a markedly pleasant 89 year old gentleman with past medical history that is most notable for CAD, chronic systolic CHF, prior TAVR, prior pacemaker placement, chronic atrial fibrillation on DOAC, and CKD Stage 3, among others; who presents with dyspnea and bilateral lower extremity edema, and is found to have suspected acute CHF exacerbation causing acute pulmonary edema.     PLAN      Suspected acute CHF exacerbation causing acute pulmonary edema: Of note, Mr. Ingram is followed by [facility] Cardiology for CAD, status post prior CABG, as well as chronic atrial fibrillation, aortic stenosis status post TAVR, and chronic biventricular CHF with reduced EF. Last TTE from 6/2022 showed LVEF 45-50%, mild pulmonary hypertension, and 9 mmHg bioprosthetic valve gradient. At his last visit with Cardiology in 7/2023 he was noted to be feeling well, on Torsemide 10-15 mg daily.  Most recently, it seems he was hospitalized in [place], [place], for dyspnea. There is an incomplete paper chart with him from that hospitalization that shows Cr 2.00 and report of mild myonecrosis, negative COVID and Influenza testing, CXR that showed pulmonary vascular congestion. His son says he was treated for 3 days and told to follow up with his Cardiology team here at discharge to determine if his Torsemide dose should be changed. He was discharged 1/22/24. Unclear if he has been taking torsemide.     Now, he presents for evaluation of recurrent acute dyspnea and worsening bilateral lower extremity edema . In the ED, he is afebrile, not hypoxic at rest. He has irregular bradycardia and accelerated hypertension. He has chronic leukopenia and anemia as discussed below. Pro-BNP is 6643, and had been 4099 on 12/25/23. EKG confirms atrial fibrillation with slow VR and intermittent pacing. Troponin T is 59, unchanged from  "recent previous Troponin checks. CXR shows interstitial pulmonary edema. Overall, his symptoms are consistent with acute CHF exacerbation causing acute pulmonary edema. His myonecrosis seems to be chronic, in the setting of CKD. ACS ruled out as serial troponins remained flat.(59>73).  Acute CHF exacerbation could be due to uncontrolled HTN.       -- Inpatient. Telemetry, TTE ordered. Cardiology consulted. Diurese with Lasix IV (dose reduced to 60 bid on 2/9),  Strict I and O's, daily weights, and low sodium diet ordered.  --Repeat TTE on 2/8 showed EF 55-60%, mildly dilated RV, moderately reduced RV systolic function, normal prosthetic aortic valve gradient, RV systolic pressure elevated consistent with moderate pulmonary hypertension.    --Creatinine 2 on 2/9.  --Intermittently on 1 to 2 L nasal cannula oxygen more for patient's symptoms. Not on O2 on 2/9. Feeling much improved with breathing.  --He will need close cardiology follow-up for diuretic titration.  Creatinine improved slightly to 1.9 on 2/10.  Switched to p.o. torsemide 10 Mg twice daily on this day.     Stage 3 CKD: Noted. He could have cardiorenal syndrome. Monitor closely while hospitalized and diuresing.   Creat 1.7 on 2/8.  2 on 2/9.  1.9 on 2/10.    CAD: He is status post CABG in 2002 [LIMA -LAD, VG-OM and the RCA]. Most recent cardiac catheterization 4/2020 reportedly revealed: \"Moderate-severe touchdown lesion of the right PDA branch of the DVC-oCNV-wIX Y-graft. Otherwise widely patent LIMA-LAD, SVG-OM, and rPL Y-graft branch.\"     -- Resumed home ASA.     Accelerated hypertension: Without clear signs of hypertensive crisis.  SBP in the 190s at admission.  Currently much better controlled and in normal range.    -- Resumed home Hydralazine, Imdur, and Metoprolol.  Now also on IV diuresis.     Hyperlipidemia: Resumed home Zocor.     Chronic atrial fibrillation and MAYA thrombus: Currently bradycardic. He has a pacemaker in place as discussed " below.    -- Resumed home Pradaxa.  Dose reduced based on renal function.     Aortic stenosis status post TAVR: 29mm Medtronic Evolut Pro, implanted  7/2020. That was complicated by heart block, leading to placement of permanent pacemaker as well. Noted     Chronic anemia and leukopenia: He is followed by Dr. Morris of Hematology for known lung nodules and renal masses, as well as chronic pancytopenia, which is being monitored serially. MDS has been thought possible, and if it worsens, the plan has been to consider bone marrow biopsy.  Currently counts appear to be at baseline.    -- Monitor CBC while hospitalized; resume home iron supplementation at discharge         PUD: He was seen 5/2020 by Dr. Fournier of GI when hospitalized for acute upper GI bleeding, and EGD showed a bleeding esophageal ulcer that was cauterized.     -- Resume home BID PPI      Asthma, moderate persistent: By history: No signs of wheezing at present. Resume home inhalers and Singulair      History of SBO due to incarcerated hernia: Status post open surgical repair in 1/2023. Noted       Disposition: Anticipate discharge home tomorrow pending stability.    Medical Decision Making       Please see A&P for additional details of medical decision making.        Clinically Significant Risk Factors                  # Hypertension: Noted on problem list  # Acute heart failure with preserved ejection fraction: heart failure noted on problem list, last echo with EF >50%, and receiving IV diuretics           # Asthma: noted on problem list  # Pacemaker present         Regi Gonzalez MD  Text Page (7am - 6pm, M-F)  [facility]  Securely message with the Vocera Web Console (learn more here)  Text page via [employer] Paging/Directory      Interval History   Stable overnight.  Patient feeling much improved this morning with his breathing.  Denies any pain.  Hoping to go home tomorrow.  Leg swelling is improved.    -Data reviewed today: I  reviewed all new labs and imaging results over the last 24 hours. I personally reviewed CXR with result as noted above    Physical Exam   Temp: 98.3  F (36.8  C) Temp src: Oral BP: (!) 148/79 Pulse: 65   Resp: 18 SpO2: 92 % O2 Device: None (Room air) Oxygen Delivery: 2 LPM  Vitals:    02/08/24 1218 02/09/24 0450 02/10/24 0306   Weight: 70.9 kg (156 lb 6.4 oz) 69 kg (152 lb 1.6 oz) 68.5 kg (151 lb)     Vital Signs with Ranges  Temp:  [97.9  F (36.6  C)-99  F (37.2  C)] 98.3  F (36.8  C)  Pulse:  [55-65] 65  Resp:  [18] 18  BP: (119-148)/(55-79) 148/79  SpO2:  [92 %-97 %] 92 %  I/O last 3 completed shifts:  In: 1200 [P.O.:1200]  Out: 1275 [Urine:1275]    Constitutional: Alert, appears comfortable, in no acute distress, calm and cooperative  Respiratory: Non labored breathing, rare crackles in bases  Cardiovascular: Regular, bilateral 2+ lower extremity edema-appears improving  GI: Abdomen is soft, non distended, non tender. Normal BS  Skin/Integumen: no rashes, no pressure sores  Neuro: alert, follows commands, moving all extremities, no facial asymmetry  Psych: mood and affect appropriate      Medications      aspirin  81 mg Oral BID    budesonide  0.5 mg Nebulization BID    dabigatran ANTICOAGULANT  75 mg Oral BID    hydrALAZINE  25 mg Oral TID    isosorbide mononitrate  60 mg Oral Daily    magnesium chloride  535 mg Oral BID    metoprolol tartrate  25 mg Oral BID    montelukast  10 mg Oral Daily    pantoprazole  40 mg Oral BID AC    simvastatin  40 mg Oral At Bedtime    sodium chloride (PF)  3 mL Intracatheter Q8H    torsemide  10 mg Oral BID       Data   Recent Labs   Lab 02/10/24  0535 02/09/24  0527 02/08/24  0245   WBC 3.5* 3.5* 3.9*   HGB 8.7* 9.0* 9.4*   MCV 64* 65* 64*    218 229    138 139   POTASSIUM 4.9 4.7 4.6   CHLORIDE 100 102 105   CO2 29 28 24   BUN 50.0* 48.4* 42.7*   CR 1.94* 2.00* 1.77*   ANIONGAP 9 8 10   AYALA 9.0 9.3 9.1   GLC 92 96 108*       Imaging  No results found for this or  any previous visit (from the past 24 hour(s)).

## 2024-02-10 NOTE — PLAN OF CARE
A&Ox4, denies pain or SOB. Tele remains AFIB SVR to 50 with intermittent pacing. VSS on RA at rest, desats to upper 70's when ambulating with therapy today. Lasix given with good results. Plan to transition to oral diuretic tomorrow. All assessments done and questions answered with .    Heart Failure Care Map  GOALS TO BE MET BEFORE DISCHARGE:    1. Decrease congestion and/or edema with diuretic therapy to achieve near optimal volume status.     Dyspnea improved: Yes, satisfactory for discharge.   Edema improved: No, further care required to meet this goal. Please explain Pt still with 3 plus pitting edema to ankles and feet.        Last 24 hour I/O:   Intake/Output Summary (Last 24 hours) at 2/9/2024 1843  Last data filed at 2/9/2024 1728  Gross per 24 hour   Intake 960 ml   Output 2125 ml   Net -1165 ml           Net I/O and Weights since admission:   01/10 2300 - 02/09 2259  In: 1200 [P.O.:1200]  Out: 4325 [Urine:4325]  Net: -3125     Vitals:    02/08/24 1218 02/09/24 0450   Weight: 70.9 kg (156 lb 6.4 oz) 69 kg (152 lb 1.6 oz)       2.  O2 sats > 90% on room air, or at prior home O2 therapy level.      Able to wean O2 this shift to keep sats above 90%?: No, further care required to meet this goal. Please explain Desats when ambulating.   Does patient use Home O2? No          Current oxygenation status:   SpO2: 95 %     O2 Device: Other (Comments) (while sleeping), Oxygen Delivery: 2 LPM    3.  Tolerates ambulation and mobility near baseline.     Ambulation: Yes, satisfactory for discharge.   Times patient ambulated with staff this shift: 1    Please review the Heart Failure Care Map for additional HF goal outcomes.    Shy Flood RN  2/9/2024

## 2024-02-10 NOTE — PROGRESS NOTES
[facility] Cardiology Progress Note      Subjective   89-year-old man who presented with acute on chronic heart failure, in the setting of RV dysfunction, coronary disease, aortic stenosis status post TAVR, pacemaker in place, CKD.  He was initiated on diuresis but had some worsening RD.    No issues overnight, net negative around 600 cc yesterday, weight marginally decreased.  Creatinine down marginally this morning    Objective     VITAL SIGNS  Temp:  [97.9  F (36.6  C)-99  F (37.2  C)] 98  F (36.7  C)  Pulse:  [54-75] 59  Resp:  [18-19] 18  BP: (119-150)/(55-91) 128/77  SpO2:  [83 %-97 %] 97 %  Admission Weight: 70.9 kg (156 lb 6.4 oz)  Current Weight: 68.5 kg (151 lb)    PHYSICAL EXAMINATION  General:  Well-developed, well-nourished. No acute distress. Alert and oriented x 3.  Pleasant and cooperative.  HEENT:  Extraocular movements grossly intact. Sclera Anicteric.   Cardiovascular: Irregular, nontachycardic, 1/6 systolic murmur, JVP elevated  Lungs:  Normal work of breathing. Clear to auscultation bilaterally.  Abdomen:  Soft, nontender, nondistended.  Positive bowel sounds.  Extremities:  Warm, well perfused.  2+ edema bilaterally around the legs  Neuro: Moving all extremities, deaf    DIAGNOSTICS    Most Recent 3 CBC's:  Recent Labs   Lab Test 02/10/24  0535 02/09/24  0527 02/08/24  0245   WBC 3.5* 3.5* 3.9*   HGB 8.7* 9.0* 9.4*   MCV 64* 65* 64*    218 229     Most Recent 3 BMP's:  Recent Labs   Lab Test 02/10/24  0535 02/09/24  0527 02/08/24  0245    138 139   POTASSIUM 4.9 4.7 4.6   CHLORIDE 100 102 105   CO2 29 28 24   BUN 50.0* 48.4* 42.7*   CR 1.94* 2.00* 1.77*   ANIONGAP 9 8 10   AYALA 9.0 9.3 9.1   GLC 92 96 108*     Most Recent 3 Troponin's:  Recent Labs   Lab Test 03/15/21  1254 12/15/20  1454 12/15/20  1137   TROPI <0.015 0.023 0.030     Most Recent 3 BNP's:  Recent Labs   Lab Test 02/08/24  0245 12/25/23  0718 02/26/23  1305  09/06/22  0756 06/20/22  1043 01/12/22  1028   NTBNPI 6,643* 4,099* 5,214*  --   --   --    NTBNP  --   --   --  4,140* 3,930* 8,937*     Most Recent Cholesterol Panel:  Recent Labs   Lab Test 11/01/23  1124   CHOL 126   LDL 63   HDL 43   TRIG 98         Assessment & Plan     # Acute on chronic heart failure  # HFpEF, RV Dysfunction  # AS s/p TAVR  # AV block s/p PPM  # Permanene AF  # moderate PH, 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  1 more dose of IV Lasix this morning and then will commence torsemide p.o. starting this afternoon.  Pending how he does may be able to dismiss tomorrow.    Plan:  - IV Lasix 60 this AM, PO torsemide 10 BID starting this afternoon  - Strict I&O's weight  - Wean O2 as tolerated  - Continue Asa, Pradaxa, Imdur, Hydralazine, Metoprolol, Somvastatin      Dennis Hurd MD  2/10/2024      Medical Decision Making

## 2024-02-10 NOTE — CONSULTS
"Consult is for CHF teaching--CHF teaching is done by Bedside RN.       Reviewed chart; pt has  + established PCP Dany Jean Baptiste, Phone: [phone]   + Cardiology Followup already on AVS:       + active insurance [employer]   + clearance from therapy session session while in hospital, per therapy (recommendation copied/pasted from their note):  \"Recommendation: Home with Assist\" rationale  Pt below baseline limited by activity tolerance, desaturated O2 with ambulation, and impaired funcitonal mobility. Pt motivated in therapy and has supportive family. Pt lives with son who is able to A with I/ADLs as needed. Recommend home with initial A with functional transfers, community mobility, and functional mobility.        Any Home O2 needs would be arranged via MD order and bedside RN contacting [facility].    No CM needs identified.         Stephanie Davis RN, BSN, PHN  [facility]  Inpatient Care Management - [facility] CM RN Mobile: [phone] daily 7:30-4:00      "

## 2024-02-10 NOTE — PROGRESS NOTES
A&O x 4, pleasant. VSS on 2LNC. Up x SBA with walker/GB. No events overnight. Occasional V-paced. Plan for transition to PO Lasix. Continue plan of care.

## 2024-02-11 ENCOUNTER — OFFICE VISIT (OUTPATIENT)
Dept: INTERPRETER SERVICES | Facility: CLINIC | Age: 89
End: 2024-02-11
Payer: COMMERCIAL

## 2024-02-11 VITALS
TEMPERATURE: 98 F | OXYGEN SATURATION: 97 % | BODY MASS INDEX: 23.93 KG/M2 | RESPIRATION RATE: 16 BRPM | SYSTOLIC BLOOD PRESSURE: 144 MMHG | DIASTOLIC BLOOD PRESSURE: 86 MMHG | WEIGHT: 146 LBS | HEART RATE: 54 BPM

## 2024-02-11 LAB
ANION GAP SERPL CALCULATED.3IONS-SCNC: 8 MMOL/L (ref 7–15)
BUN SERPL-MCNC: 51.6 MG/DL (ref 8–23)
CALCIUM SERPL-MCNC: 9.3 MG/DL (ref 8.8–10.2)
CHLORIDE SERPL-SCNC: 99 MMOL/L (ref 98–107)
CREAT SERPL-MCNC: 1.8 MG/DL (ref 0.67–1.17)
DEPRECATED HCO3 PLAS-SCNC: 30 MMOL/L (ref 22–29)
EGFRCR SERPLBLD CKD-EPI 2021: 36 ML/MIN/1.73M2
GLUCOSE SERPL-MCNC: 98 MG/DL (ref 70–99)
POTASSIUM SERPL-SCNC: 4.6 MMOL/L (ref 3.4–5.3)
SODIUM SERPL-SCNC: 137 MMOL/L (ref 135–145)

## 2024-02-11 PROCEDURE — T1013 SIGN LANG/ORAL INTERPRETER: HCPCS | Mod: U3

## 2024-02-11 PROCEDURE — 999N000157 HC STATISTIC RCP TIME EA 10 MIN

## 2024-02-11 PROCEDURE — 94640 AIRWAY INHALATION TREATMENT: CPT

## 2024-02-11 PROCEDURE — 99239 HOSP IP/OBS DSCHRG MGMT >30: CPT | Performed by: HOSPITALIST

## 2024-02-11 PROCEDURE — 80048 BASIC METABOLIC PNL TOTAL CA: CPT | Performed by: HOSPITALIST

## 2024-02-11 PROCEDURE — 99232 SBSQ HOSP IP/OBS MODERATE 35: CPT | Performed by: INTERNAL MEDICINE

## 2024-02-11 PROCEDURE — 250N000013 HC RX MED GY IP 250 OP 250 PS 637: Performed by: INTERNAL MEDICINE

## 2024-02-11 PROCEDURE — 36415 COLL VENOUS BLD VENIPUNCTURE: CPT | Performed by: HOSPITALIST

## 2024-02-11 PROCEDURE — 250N000009 HC RX 250: Performed by: INTERNAL MEDICINE

## 2024-02-11 RX ORDER — ISOSORBIDE MONONITRATE 60 MG/1
60 TABLET, EXTENDED RELEASE ORAL DAILY
Qty: 30 TABLET | Refills: 0 | Status: SHIPPED | OUTPATIENT
Start: 2024-02-11 | End: 2024-04-01

## 2024-02-11 RX ORDER — DABIGATRAN ETEXILATE 75 MG/1
75 CAPSULE ORAL 2 TIMES DAILY
Qty: 60 CAPSULE | Refills: 0 | Status: SHIPPED | OUTPATIENT
Start: 2024-02-11 | End: 2024-02-15

## 2024-02-11 RX ORDER — TORSEMIDE 5 MG/1
5 TABLET ORAL DAILY
Status: DISCONTINUED | OUTPATIENT
Start: 2024-02-11 | End: 2024-02-11 | Stop reason: HOSPADM

## 2024-02-11 RX ORDER — TORSEMIDE 5 MG/1
TABLET ORAL
Start: 2024-02-11 | End: 2024-04-18

## 2024-02-11 RX ORDER — TORSEMIDE 10 MG/1
10 TABLET ORAL DAILY
Status: DISCONTINUED | OUTPATIENT
Start: 2024-02-12 | End: 2024-02-11 | Stop reason: HOSPADM

## 2024-02-11 RX ORDER — HYDRALAZINE HYDROCHLORIDE 25 MG/1
25 TABLET, FILM COATED ORAL 3 TIMES DAILY
Start: 2024-02-11 | End: 2024-04-01

## 2024-02-11 RX ADMIN — ASPIRIN 81 MG: 81 TABLET, COATED ORAL at 08:04

## 2024-02-11 RX ADMIN — HYDRALAZINE HYDROCHLORIDE 25 MG: 25 TABLET, FILM COATED ORAL at 08:04

## 2024-02-11 RX ADMIN — DABIGATRAN ETEXILATE MESYLATE 75 MG: 75 CAPSULE ORAL at 08:04

## 2024-02-11 RX ADMIN — MAGNESIUM 64 MG (MAGNESIUM CHLORIDE) TABLET,DELAYED RELEASE 535 MG: at 08:04

## 2024-02-11 RX ADMIN — TORSEMIDE 10 MG: 10 TABLET ORAL at 08:04

## 2024-02-11 RX ADMIN — METOPROLOL TARTRATE 25 MG: 25 TABLET, FILM COATED ORAL at 08:04

## 2024-02-11 RX ADMIN — MONTELUKAST 10 MG: 10 TABLET, FILM COATED ORAL at 08:04

## 2024-02-11 RX ADMIN — ISOSORBIDE MONONITRATE 60 MG: 60 TABLET, EXTENDED RELEASE ORAL at 08:04

## 2024-02-11 RX ADMIN — BUDESONIDE 0.5 MG: 0.5 INHALANT RESPIRATORY (INHALATION) at 09:38

## 2024-02-11 ASSESSMENT — ACTIVITIES OF DAILY LIVING (ADL)
ADLS_ACUITY_SCORE: 35
ADLS_ACUITY_SCORE: 35
ADLS_ACUITY_SCORE: 38
ADLS_ACUITY_SCORE: 35

## 2024-02-11 NOTE — PLAN OF CARE
A&Ox4, c/o chronic pain in left ankle with some relief from tylenol and ice. Tele remains AFIB CVR with intermittent pacing. Endorses breathing is much improved. BLE wrapped with ace bandages much of the day with improvement in edema. Stated PO torsemide. Ambulated in hallway with SBA & FWW, denied SOB, O2 sats 89% on RA. Plan for possible discharge home tomorrow.

## 2024-02-11 NOTE — PROGRESS NOTES
sPatient has been assessed for Home Oxygen needs. Oxygen readings:    *Pulse oximetry (SpO2) = 92% on room air at rest while awake.    *SpO2 improved to 95% on 1liters/minute at rest.    *SpO2 = 86% on room air during activity/with exercise.    *SpO2 improved to 92% on 3liters/minute during activity/with exercise.

## 2024-02-11 NOTE — PROGRESS NOTES
[facility] Cardiology Progress Note      Subjective   89-year-old man who presented with acute on chronic heart failure, in the setting of RV dysfunction, coronary disease, aortic stenosis status post TAVR, pacemaker in place, CKD.  He was initiated on diuresis but had some worsening RD.     No issues overnight, mildly hypertensive this morning, creatinine downtrending    Objective     VITAL SIGNS  Temp:  [98  F (36.7  C)-98.7  F (37.1  C)] 98  F (36.7  C)  Pulse:  [54-65] 54  Resp:  [16-18] 16  BP: (133-148)/(65-86) 144/86  SpO2:  [92 %-97 %] 97 %  Admission Weight: 70.9 kg (156 lb 6.4 oz)  Current Weight: 66.2 kg (146 lb)    PHYSICAL EXAMINATION  General:  Well-developed, well-nourished. No acute distress. Alert and oriented x 3.  Pleasant and cooperative.  HEENT:  Extraocular movements grossly intact. Sclera Anicteric.   Cardiovascular: Irregular, nontachycardic, 1/6 systolic murmur,   Lungs:  Normal work of breathing. Clear to auscultation bilaterally.  Abdomen:  Soft, nontender, nondistended.  Positive bowel sounds.  Extremities:  Warm, well perfused.  1+ edema bilaterally around the legs  Neuro: Moving all extremities, deaf    DIAGNOSTICS    Most Recent 3 CBC's:  Recent Labs   Lab Test 02/10/24  0535 02/09/24  0527 02/08/24  0245   WBC 3.5* 3.5* 3.9*   HGB 8.7* 9.0* 9.4*   MCV 64* 65* 64*    218 229     Most Recent 3 BMP's:  Recent Labs   Lab Test 02/11/24  0624 02/10/24  0535 02/09/24  0527    138 138   POTASSIUM 4.6 4.9 4.7   CHLORIDE 99 100 102   CO2 30* 29 28   BUN 51.6* 50.0* 48.4*   CR 1.80* 1.94* 2.00*   ANIONGAP 8 9 8   AYALA 9.3 9.0 9.3   GLC 98 92 96     Most Recent 3 Troponin's:  Recent Labs   Lab Test 03/15/21  1254 12/15/20  1454 12/15/20  1137   TROPI <0.015 0.023 0.030     Most Recent 3 BNP's:  Recent Labs   Lab Test 02/08/24  0245 12/25/23  0718 02/26/23  1305 09/06/22  0756 06/20/22  1043 01/12/22  1028   NTBNPI 6,643* 4,099* 5,214*   --   --   --    NTBNP  --   --   --  4,140* 3,930* 8,937*     Most Recent Cholesterol Panel:  Recent Labs   Lab Test 11/01/23  1124   CHOL 126   LDL 63   HDL 43   TRIG 98         Assessment & Plan     # Acute on chronic heart failure  # HFpEF, RV Dysfunction  # AS s/p TAVR  # AV block s/p PPM  # Permanene AF  # moderate PH, 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  I think reasonable for him to discharge today with close outpatient follow-up. We will send him out on Torsemide 10 in the AM - 5 PM. He will carefully monitor his weights over the next few days, he has a scale at home. He has Cardiology follow up scheduled in about a week.     Plan:  -Ok to dismiss today  -Continue Asa, Pradaxa, Imdur, Hydralazine, Metoprolol, Simvastatin  - Home diuretic regimen: PO Torsemide 10-5  - Close follow up scheduled      Dennis Hurd MD  2/11/2024      Medical Decision Making       30 MINUTES SPENT BY ME on the date of service doing chart review, history, exam, documentation & further activities per the note.

## 2024-02-11 NOTE — DISCHARGE SUMMARY
Discharge Summary  Hospitalist    Date of Admission:  2/8/2024  Date of Discharge:  2/11/2024  Discharging Provider: Regi Gonzalez MD, MD  Date of Service (when I saw the patient): 02/11/24    Discharge Diagnoses     Suspected acute on chronic diastolic CHF exacerbation causing acute pulmonary edema  History of Present Illness   Please refer H & P for details.      Hospital Course   Shiraz Ingram is a markedly pleasant 89 year old gentleman with past medical history that is most notable for CAD, chronic systolic CHF, prior TAVR, prior pacemaker placement, chronic atrial fibrillation on DOAC, and CKD Stage 3, among others; who presents with dyspnea and bilateral lower extremity edema, and is found to have suspected acute CHF exacerbation causing acute pulmonary edema.     PLAN      Suspected acute on chronic diastolic CHF exacerbation causing acute pulmonary edema:   Moderate pulmonary hypertension   Of note, Mr. Ingram is followed by [facility] for CAD, status post prior CABG, as well as chronic atrial fibrillation, aortic stenosis status post TAVR, and chronic biventricular CHF with reduced EF. Last TTE from 6/2022 showed LVEF 45-50%, mild pulmonary hypertension, and 9 mmHg bioprosthetic valve gradient. At his last visit with Cardiology in 7/2023 he was noted to be feeling well, on Torsemide 10-15 mg daily.  Most recently, it seems he was hospitalized in [place], [place], for dyspnea. There is an incomplete paper chart with him from that hospitalization that shows Cr 2.00 and report of mild myonecrosis, negative COVID and Influenza testing, CXR that showed pulmonary vascular congestion. His son says he was treated for 3 days and told to follow up with his Cardiology team here at discharge to determine if his Torsemide dose should be changed. He was discharged 1/22/24. Unclear if he has been taking torsemide.     Now, he presents for evaluation of recurrent acute dyspnea and worsening bilateral lower extremity  edema . In the ED, he is afebrile, not hypoxic at rest. He has irregular bradycardia and accelerated hypertension. He has chronic leukopenia and anemia as discussed below. Pro-BNP is 6643, and had been 4099 on 12/25/23. EKG confirms atrial fibrillation with slow VR and intermittent pacing. Troponin T is 59, unchanged from recent previous Troponin checks. CXR shows interstitial pulmonary edema. Overall, his symptoms are consistent with acute CHF exacerbation causing acute pulmonary edema. His myonecrosis seems to be chronic, in the setting of CKD. ACS ruled out as serial troponins remained flat.(59>73).  Acute CHF exacerbation could be due to uncontrolled HTN.       -- Inpatient. Telemetry, TTE ordered. Cardiology consulted. Diurese with Lasix IV (dose reduced to 60 bid on 2/9),  Strict I and O's, daily weights, and low sodium diet ordered.  --Repeat TTE on 2/8 showed EF 55-60%, mildly dilated RV, moderately reduced RV systolic function, normal prosthetic aortic valve gradient, RV systolic pressure elevated consistent with moderate pulmonary hypertension.    --Creatinine 2 on 2/9.  --Intermittently on 1 to 2 L nasal cannula oxygen more for patient's symptoms. Not on O2 on 2/9. Feeling much improved with breathing.  --He will need close cardiology follow-up for diuretic titration.  Creatinine improved slightly to 1.9 on 2/10.  Switched to p.o. torsemide 10 Mg twice daily on this day.  -- 2/11: Torsemide 10 Mg in the morning, 5 Mg in the afternoon per cardiology.  Stable for discharge home today.  Home oxygen assessment completed and patient will discharge with home oxygen with activity.  Follow-up with PCP and cardiology, recheck BMP as outpatient.     Stage 3 CKD: Noted. He could have cardiorenal syndrome. Monitor closely while hospitalized and diuresing.   Creat 1.7 on 2/8.  2 on 2/9.  1.8 on 2/11.     CAD: He is status post CABG in 2002 [LIMA -LAD, VG-OM and the RCA]. Most recent cardiac catheterization 4/2020  "reportedly revealed: \"Moderate-severe touchdown lesion of the right PDA branch of the IAO-iQBB-xIM Y-graft. Otherwise widely patent LIMA-LAD, SVG-OM, and rPL Y-graft branch.\"     -- Resumed home ASA.     Accelerated hypertension: Without clear signs of hypertensive crisis.  SBP in the 190s at admission.  Currently much better controlled and in normal range.    -- Resumed home Hydralazine, Imdur, and Metoprolol.  Hydralazine and Imdur dose doubled per cardiology.  Will continue on increased dose at discharge.  See torsemide dosing as noted above.     Hyperlipidemia: Resumed home Zocor.     Chronic atrial fibrillation and MAYA thrombus: Currently bradycardic. He has a pacemaker in place as discussed below.    -- Resumed home Pradaxa.  Dose reduced based on renal function.  Will discharge on dose reduced Pradaxa per renal function.  This will be need to be followed up by PCP/cardiology regarding appropriately dosing Pradaxa based on renal function.     Aortic stenosis status post TAVR: 29mm Medtronic Evolut Pro, implanted  7/2020. That was complicated by heart block, leading to placement of permanent pacemaker as well. Noted     Chronic anemia and leukopenia: He is followed by Dr. Morris of Hematology for known lung nodules and renal masses, as well as chronic pancytopenia, which is being monitored serially. MDS has been thought possible, and if it worsens, the plan has been to consider bone marrow biopsy.  Currently counts appear to be at baseline.    -- Monitor CBC while hospitalized; resume home iron supplementation at discharge         PUD: He was seen 5/2020 by Dr. Fournier of GI when hospitalized for acute upper GI bleeding, and EGD showed a bleeding esophageal ulcer that was cauterized.     -- Resume home BID PPI      Asthma, moderate persistent: By history: No signs of wheezing at present. Resume home inhalers and Singulair      History of SBO due to incarcerated hernia: Status post open surgical repair in 1/2023. " Noted            Regi Gonzalez MD, MD      Pending Results   These results will be followed up by Hospitalist team.  Unresulted Labs Ordered in the Past 30 Days of this Admission       No orders found from 1/9/2024 to 2/9/2024.            Code Status   Full Code       Primary Care Physician   Dany Rodriguez MD    Follow-ups Needed After Discharge   Follow-up Appointments     Follow-up and recommended labs and tests       Follow up with primary care provider, Dany Rodriguez MD, within   7 days for hospital follow- up.  No follow up labs or test are needed.   Follow up with Cardiology as arranged/ recommended by them.            Physical Exam   Temp: 98  F (36.7  C) Temp src: Oral BP: (!) 144/86 Pulse: 54   Resp: 16 SpO2: 97 % O2 Device: None (Room air) Oxygen Delivery: 2 LPM  Vitals:    02/09/24 0450 02/10/24 0306 02/11/24 0341   Weight: 69 kg (152 lb 1.6 oz) 68.5 kg (151 lb) 66.2 kg (146 lb)     Vital Signs with Ranges  Temp:  [98  F (36.7  C)-98.3  F (36.8  C)] 98  F (36.7  C)  Pulse:  [54-65] 54  Resp:  [16-18] 16  BP: (133-148)/(74-86) 144/86  SpO2:  [92 %-97 %] 97 %  I/O last 3 completed shifts:  In: 600 [P.O.:600]  Out: 1925 [Urine:1925]      Constitutional: Alert, appears comfortable, in no acute distress, calm and cooperative  Respiratory: Non labored breathing, rare crackles in bases  Cardiovascular: Regular, bilateral 1-2+ lower extremity edema-appears improving  GI: Abdomen is soft, non distended, non tender. Normal BS  Skin/Integumen: no rashes, no pressure sores  Neuro: alert, follows commands, moving all extremities, no facial asymmetry  Psych: mood and affect appropriate                Discharge Disposition   Discharged to home  Condition at discharge: Stable    Consultations This Hospital Stay   CARDIOLOGY IP CONSULT  CARE MANAGEMENT / SOCIAL WORK IP CONSULT  CORE CLINIC EVALUATION IP CONSULT  OCCUPATIONAL THERAPY ADULT IP CONSULT  NUTRITION SERVICES ADULT IP CONSULT  CARE  MANAGEMENT / SOCIAL WORK IP CONSULT    Time Spent on this Encounter   I, Regi Gonzalez MD, personally saw the patient today and spent greater than 30 minutes discharging this patient.    Discharge Orders      Basic metabolic panel     Reason for your hospital stay    Congestive heart failure exacerbation     Follow-up and recommended labs and tests     Follow up with primary care provider, Dany Rodriguez MD, within 7 days for hospital follow- up.  No follow up labs or test are needed. Follow up with Cardiology as arranged/ recommended by them.     Activity    Your activity upon discharge: activity as tolerated     When to contact your care team    Call your primary doctor if you have any of the following: worsening chest pain, shortness of breath, leg swelling, bleeding issues, fever, cough.     Oxygen Adult/Peds    Oxygen Documentation  I certify that this patient, Shiraz Ingram has been under my care (or a nurse practitioner or physican's assistant working with me). This is the face-to-face encounter for oxygen medical necessity.      At the time of this encounter, I have reviewed the qualifying testing and have determined that supplemental oxygen is reasonable and necessary and is expected to improve the patient's condition in a home setting.       Patient has continued oxygen desaturation due to Chronic Heart Failure I50.    If portability is ordered, is the patient mobile within the home? yes     Diet    Follow this diet upon discharge: Orders Placed This Encounter      Room Service    2 gm NA Diet     Discharge Medications   Discharge Medication List as of 2/11/2024 12:15 PM        CONTINUE these medications which have CHANGED    Details   dabigatran ANTICOAGULANT (PRADAXA) 75 MG capsule Take 1 capsule (75 mg) by mouth 2 times daily, Disp-60 capsule, R-0, E-Prescribe      hydrALAZINE (APRESOLINE) 25 MG tablet Take 1 tablet (25 mg) by mouth 3 times daily, No Print Out      isosorbide mononitrate  (IMDUR) 60 MG 24 hr tablet Take 1 tablet (60 mg) by mouth daily Can take 2 pills of home supply of 30 mg tabs to equal 60 mg daily to finish existing home supply., Disp-30 tablet, R-0, E-Prescribe      torsemide (DEMADEX) 5 MG tablet Take 10 mg every morning and 5 mg every afternoon, No Print Out           CONTINUE these medications which have NOT CHANGED    Details   acetaminophen (TYLENOL) 500 MG tablet Take 1,000 mg by mouth every 8 hours as needed, Historical      albuterol (PROAIR HFA/PROVENTIL HFA/VENTOLIN HFA) 108 (90 Base) MCG/ACT inhaler INHALE 1 TO 2 PUFFS BY MOUTH EVERY 4 TO 6 HOURS AS NEEDED, Disp-18 g, R-2, E-PrescribePharmacy may dispense brand covered by insurance (Proair, or proventil or ventolin or generic albuterol inhaler)      aspirin (ASA) 81 MG EC tablet Take 81 mg by mouth 2 times daily 0700 1700, Historical      budesonide (PULMICORT) 0.5 MG/2ML neb solution Take 2 mLs (0.5 mg) by nebulization 2 times daily, Disp-120 mL, R-3, E-Prescribe      calcium carbonate 600 mg-vitamin D 400 units (CALTRATE) 600-400 MG-UNIT per tablet Take 1 tablet by mouth daily, Historical      ipratropium - albuterol 0.5 mg/2.5 mg/3 mL (DUONEB) 0.5-2.5 (3) MG/3ML neb solution Inhale 1 vial (3 mLs) into the lungs 4 times daily, Disp-360 mL, R-11, E-Prescribe      magnesium chloride (MAG64) 535 (64 Mg) MG TBEC CR tablet Take 1 tablet (535 mg) by mouth 2 times daily, Disp-180 tablet, R-4, E-Prescribe      metoprolol tartrate (LOPRESSOR) 25 MG tablet Take 1 tablet (25 mg) by mouth 2 times daily, Disp-180 tablet, R-1, E-Prescribe      montelukast (SINGULAIR) 10 MG tablet Take 1 tablet (10 mg) by mouth daily, Disp-90 tablet, R-1, E-Prescribe      multivitamin, therapeutic (THERA-VIT) TABS tablet Take 1 tablet by mouth 2 times daily, Historical      nitroGLYcerin (NITROSTAT) 0.4 MG sublingual tablet For chest pain place 1 tablet under the tongue every 5 minutes for 3 doses. If symptoms persist 5 minutes after 1st dose call  911., Disp-12 tablet, R-0, E-Prescribe      pantoprazole (PROTONIX) 40 MG EC tablet TAKE 1 TABLET BEFORE MEALS TWICE A DAY Strength: 40 mg, Disp-180 tablet, R-1, E-Prescribe      simvastatin (ZOCOR) 40 MG tablet Take 1 tablet (40 mg) by mouth At Bedtime, Disp-90 tablet, R-4, E-Prescribe           Allergies   No Known Allergies  Data   Most Recent 3 CBC's:  Recent Labs   Lab Test 02/10/24  0535 02/09/24  0527 02/08/24  0245   WBC 3.5* 3.5* 3.9*   HGB 8.7* 9.0* 9.4*   MCV 64* 65* 64*    218 229      Most Recent 3 BMP's:  Recent Labs   Lab Test 02/11/24  0624 02/10/24  0535 02/09/24  0527    138 138   POTASSIUM 4.6 4.9 4.7   CHLORIDE 99 100 102   CO2 30* 29 28   BUN 51.6* 50.0* 48.4*   CR 1.80* 1.94* 2.00*   ANIONGAP 8 9 8   AYALA 9.3 9.0 9.3   GLC 98 92 96     Most Recent 2 LFT's:  Recent Labs   Lab Test 11/01/23  1124 02/26/23  1305   AST 26 34   ALT 13 16   ALKPHOS 69 95   BILITOTAL 0.6 0.5     Most Recent INR's and Anticoagulation Dosing History:  Anticoagulation Dose History  More data may exist         Latest Ref Rng & Units 3/16/2012 4/23/2020 4/24/2020 5/23/2020 6/3/2020 7/13/2020 3/15/2021   Recent Dosing and Labs   INR 0.86 - 1.14 1.11  2.35  1.24  2.18  1.25  1.21  1.51      Most Recent 3 Troponin's:  Recent Labs   Lab Test 03/15/21  1254 12/15/20  1454 12/15/20  1137   TROPI <0.015 0.023 0.030     Most Recent Cholesterol Panel:  Recent Labs   Lab Test 11/01/23  1124   CHOL 126   LDL 63   HDL 43   TRIG 98     Most Recent 6 Bacteria Isolates From Any Culture (See EPIC Reports for Culture Details):  Recent Labs   Lab Test 03/16/21  2310 03/15/21  1320 03/15/21  1254 09/29/20  0950 09/28/20 2049 09/28/20 2028   CULT Heavy growth  Normal katie   No growth No growth Heavy growth  Normal katie   No growth No growth  No growth     Most Recent TSH, T4 and A1c Labs:  Recent Labs   Lab Test 11/07/23  1354   TSH 4.64*   T4 1.24       Results for orders placed or performed during the hospital encounter of  24   XR Chest 2 Views    Narrative    EXAM: XR CHEST 2 VIEWS  LOCATION: [facility]  DATE: 2024    INDICATION: sob, le edema, not taking chf meds  COMPARISON: 2023.    FINDINGS: Sternal wires, heart valve prosthesis and a left subclavian cardiac device. There is no pneumothorax. The heart is enlarged. There is pulmonary vascular congestion and interstitial pulmonary edema. Small bilateral pleural effusions, left larger   than right. Mild bibasilar probable atelectasis. Left hemidiaphragm is elevated.      Impression    IMPRESSION: Interstitial pulmonary edema.   Echocardiogram Complete     Value    LVEF  55-60%    Narrative    [id number]  [id number]  [id number]  [id number]^RAGHAV^JESSI^HEMANTH     [facility]  Echocardiography Laboratory  [address] [place] [address]     Name: SRIKANTH OBANDO  MRN: [id number]  : 1934  Study Date: 2024 11:13 AM  Age: 89 yrs  Gender: Male  Patient Location: [facility]  Reason For Study: Heart Failure  Ordering Physician: JESSI AVILEZ  Referring Physician: JESSI AVILEZ  Performed By: Nimco Alves     BSA: 2.6 m2  Height: 111 in  Weight: 153 lb  HR: 53  BP: 190/86 mmHg  ______________________________________________________________________________  Procedure  Complete Echo Adult. Optison (NDC #1233-1860) given intravenously.  ______________________________________________________________________________  Interpretation Summary     The visual ejection fraction is 55-60%.  Left ventricular systolic function is normal.  The right ventricle is mildly dilated.  The right ventricular systolic function is moderately reduced.  29mm Medtronic Evolut Pro TAVR valve in the aortic position.,  The gradient is normal for this prosthetic aortic valve.  Right ventricular systolic pressure is elevated, consistent with moderate  pulmonary hypertension.  The rhythm was atrial fibrillation.  The study was technically  difficult. The study was technically limited.  ______________________________________________________________________________  Left Ventricle  The left ventricle is normal in size. There is normal left ventricular wall  thickness. Diastolic Doppler findings (E/E' ratio and/or other parameters)  suggest left ventricular filling pressures are increased. The visual ejection  fraction is 55-60%. Left ventricular systolic function is normal. Septal wall  motion abnormality may reflect pacemaker activation.     Right Ventricle  There is a catheter/pacemaker lead seen in the right ventricle. The right  ventricle is mildly dilated. The right ventricular systolic function is  moderately reduced.     Atria  The left atrium is moderately dilated. The right atrium is moderate to  severely dilated. There is no color Doppler evidence of an atrial shunt.     Mitral Valve  There is moderate mitral annular calcification. There is trace mitral  regurgitation.     Tricuspid Valve  There is mild (1+) tricuspid regurgitation. The right ventricular systolic  pressure is approximated at 44.1 mmHg plus the right atrial pressure. IVC  diameter >2.1 cm collapsing <50% with sniff suggests a high RA pressure  estimated at 15 mmHg or greater. Right ventricular systolic pressure is  elevated, consistent with moderate pulmonary hypertension.     Aortic Valve  No aortic regurgitation is present. The peak AoV pressure gradient is 8.0  mmHg. The mean AoV pressure gradient is 4.6 mmHg. 29mm Medtronic Evolut Pro  TAVR valve in the aortic position.,. The gradient is normal for this  prosthetic aortic valve.     Pulmonic Valve  There is no pulmonic valvular regurgitation.     Vessels  Normal size ascending aorta. Dilation of the inferior vena cava is present  with abnormal respiratory variation in diameter. IVC diameter >2.1 cm  collapsing <50% with sniff suggests a high RA pressure estimated at 15 mmHg or  greater.     Pericardium  There is no  pericardial effusion.     Rhythm  The rhythm was atrial fibrillation.  ______________________________________________________________________________  MMode/2D Measurements & Calculations  IVSd: 0.94 cm     LVIDd: 5.6 cm  LVIDs: 3.9 cm  LVPWd: 0.91 cm  FS: 30.7 %  LV mass(C)d: 199.0 grams  LV mass(C)dI: 76.5 grams/m2  LA dimension: 5.0 cm  asc Aorta Diam: 3.6 cm  Asc Ao diam index BSA (cm/m2): 1.4  Asc Ao diam index Ht(cm/m): 1.3  RV Base: 4.6 cm  RWT: 0.32  TAPSE: 1.2 cm     Doppler Measurements & Calculations  MV E max vishnu: 147.0 cm/sec  MV A max vishnu: 44.0 cm/sec  MV E/A: 3.3  MV dec slope: 930.8 cm/sec2  MV dec time: 0.16 sec  Ao V2 max: 145.0 cm/sec  Ao max P.0 mmHg  Ao V2 mean: 95.2 cm/sec  Ao mean P.6 mmHg  Ao V2 VTI: 33.2 cm  Ao acc time: 0.07 sec     LV V1 max P.8 mmHg  LV V1 max: 83.6 cm/sec  LV V1 VTI: 19.5 cm  PA acc time: 0.10 sec  TR max vishnu: 331.9 cm/sec  TR max P.1 mmHg  AV Vishnu Ratio (DI): 0.58  E/E' av.4  Lateral E/e': 12.7  Medial E/e': 26.0  RV S Vishnu: 8.9 cm/sec     ______________________________________________________________________________  Report approved by: Billie Montez 2024 12:43 PM

## 2024-02-11 NOTE — PROGRESS NOTES
Oxygen Documentation  I certify that this patient, Shiraz Ingram has been under my care (or a nurse practitioner or physican's assistant working with me). This is the face-to-face encounter for oxygen medical necessity.      At the time of this encounter, I have reviewed the qualifying testing and have determined that supplemental oxygen is reasonable and necessary and is expected to improve the patient's condition in a home setting.       Patient has continued oxygen desaturation due to Chronic Heart Failure I50.    If portability is ordered, is the patient mobile within the home? yes

## 2024-02-11 NOTE — PLAN OF CARE
Occupational Therapy Discharge Summary    Reason for therapy discharge:    Discharged to home.    Progress towards therapy goal(s). See goals on Care Plan in [facility] electronic health record for goal details.  Goals partially met.  Barriers to achieving goals:   discharge from facility.    Therapy recommendation(s):    Pt below baseline limited by activity tolerance, desaturated O2 with ambulation, and impaired funcitonal mobility. Pt motivated in therapy and has supportive family. Pt lives with son who is able to A with I/ADLs as needed. Recommend home with initial A with functional transfers, community mobility, and functional mobility.

## 2024-02-11 NOTE — PROGRESS NOTES
A&O x 4, VSS on 2LNC, saturation mid 90s. Tele AV pacing. Voiding well using urinal. Possible discharge today. Continue plan of care.

## 2024-02-11 NOTE — PLAN OF CARE
VSS, not in pain at time of discharge. Pt states all belongings and paperwork are accounted for. Medications have been filled at in house pharmacy and are with pt at time of discharge. Discharge education complete including medications, follow up appointments, heart failure specific education, home oxygen and all instructions using . Pt and family verbalized understanding. All questions answered. Family here to drive pt home.

## 2024-02-12 ENCOUNTER — PATIENT OUTREACH (OUTPATIENT)
Dept: CARE COORDINATION | Facility: CLINIC | Age: 89
End: 2024-02-12
Payer: COMMERCIAL

## 2024-02-12 NOTE — PROGRESS NOTES
Clinic Care Coordination Contact  Community Health Worker Initial Outreach    Patient accepts CC: No, Pt declined needs for extra support.   Nearest appt available with PCP 03/04/2024 for hospital. Pt requested to talk with the  from clinic to have appt. In this month.     Clinic Care Coordination Contact  JUAN DIEGO Arango: Post-Discharge Note  SITUATION                                                      Admission:    Admission Date: 02/08/24   Reason for Admission: Suspected acute on chronic diastolic CHF exacerbation causing acute pulmonary edema  Discharge:   Discharge Date: 02/11/24  Discharge Diagnosis: Suspected acute on chronic diastolic CHF exacerbation causing acute pulmonary edema    BACKGROUND                                                      Per hospital discharge summary and inpatient provider notes:    Shiraz Ingram is a markedly pleasant 89 year old gentleman with past medical history that is most notable for CAD, chronic systolic CHF, prior TAVR, prior pacemaker placement, chronic atrial fibrillation on DOAC, and CKD Stage 3, among others; who presents with dyspnea and bilateral lower extremity edema, and is found to have suspected acute CHF exacerbation causing acute pulmonary edema.    ASSESSMENT           Discharge Assessment  How are you doing now that you are home?: doing well  How are your symptoms? (Red Flag symptoms escalate to triage hotline per guidelines): Improved  Do you feel your condition is stable enough to be safe at home until your provider visit?: Yes  Does the patient have their discharge instructions? : Yes  Does the patient have questions regarding their discharge instructions? : No  Were you started on any new medications or were there changes to any of your previous medications? : Yes  Does the patient have all of their medications?: Yes  Do you have questions regarding any of your medications? : No  Do you have all of your needed medical supplies or equipment  (DME)?  (i.e. oxygen tank, CPAP, cane, etc.): Yes  Discharge follow-up appointment scheduled within 14 calendar days? : Yes  Discharge Follow Up Appointment Date: 02/20/24  Discharge Follow Up Appointment Scheduled with?: Specialty Care Provider    Post-op (W CTA Only)  If the patient had a surgery or procedure, do they have any questions for a nurse?: No         PLAN                                                      Outpatient Plan:      Follow-ups Needed After Discharge  Follow-up Appointments     Follow-up and recommended labs and tests       Follow up with primary care provider, Dany Rodriguez MD, within   7 days for hospital follow- up.  No follow up labs or test are needed.   Follow up with Cardiology as arranged/ recommended by them.      Future Appointments   Date Time Provider Department [place]   2/20/2024  7:35 AM Jaxon Field MD [facility]   2/20/2024  8:15 AM VITO BRUNNER [facility]   5/1/2024  1:00 PM Dany Rodriguez MD [facility]     For any urgent concerns, please contact our 24 hour nurse triage line: [phone]     CIARAN Madrigal  [phone]  [facility]

## 2024-02-15 ENCOUNTER — OFFICE VISIT (OUTPATIENT)
Dept: FAMILY MEDICINE | Facility: CLINIC | Age: 89
End: 2024-02-15
Payer: COMMERCIAL

## 2024-02-15 ENCOUNTER — TELEPHONE (OUTPATIENT)
Dept: CARDIOLOGY | Facility: CLINIC | Age: 89
End: 2024-02-15

## 2024-02-15 VITALS
HEART RATE: 56 BPM | OXYGEN SATURATION: 99 % | SYSTOLIC BLOOD PRESSURE: 144 MMHG | DIASTOLIC BLOOD PRESSURE: 68 MMHG | WEIGHT: 140 LBS | TEMPERATURE: 98.2 F | RESPIRATION RATE: 21 BRPM | BODY MASS INDEX: 22.94 KG/M2

## 2024-02-15 DIAGNOSIS — D61.818 PANCYTOPENIA (H): ICD-10-CM

## 2024-02-15 DIAGNOSIS — I48.20 CHRONIC ATRIAL FIBRILLATION (H): ICD-10-CM

## 2024-02-15 DIAGNOSIS — I25.10 CORONARY ARTERY DISEASE INVOLVING NATIVE CORONARY ARTERY OF NATIVE HEART WITHOUT ANGINA PECTORIS: ICD-10-CM

## 2024-02-15 DIAGNOSIS — D69.6 THROMBOCYTOPENIA, UNSPECIFIED (H): ICD-10-CM

## 2024-02-15 DIAGNOSIS — J96.01 ACUTE RESPIRATORY FAILURE WITH HYPOXIA (H): ICD-10-CM

## 2024-02-15 DIAGNOSIS — I50.33 ACUTE ON CHRONIC DIASTOLIC CONGESTIVE HEART FAILURE (H): ICD-10-CM

## 2024-02-15 DIAGNOSIS — N18.32 STAGE 3B CHRONIC KIDNEY DISEASE (H): ICD-10-CM

## 2024-02-15 DIAGNOSIS — Z09 HOSPITAL DISCHARGE FOLLOW-UP: Primary | ICD-10-CM

## 2024-02-15 LAB
ANION GAP SERPL CALCULATED.3IONS-SCNC: 12 MMOL/L (ref 7–15)
BUN SERPL-MCNC: 56.8 MG/DL (ref 8–23)
CALCIUM SERPL-MCNC: 9.3 MG/DL (ref 8.8–10.2)
CHLORIDE SERPL-SCNC: 101 MMOL/L (ref 98–107)
CREAT SERPL-MCNC: 1.75 MG/DL (ref 0.67–1.17)
DEPRECATED HCO3 PLAS-SCNC: 29 MMOL/L (ref 22–29)
EGFRCR SERPLBLD CKD-EPI 2021: 37 ML/MIN/1.73M2
GLUCOSE SERPL-MCNC: 98 MG/DL (ref 70–99)
POTASSIUM SERPL-SCNC: 4.4 MMOL/L (ref 3.4–5.3)
SODIUM SERPL-SCNC: 142 MMOL/L (ref 135–145)

## 2024-02-15 PROCEDURE — 80048 BASIC METABOLIC PNL TOTAL CA: CPT | Performed by: PHYSICIAN ASSISTANT

## 2024-02-15 PROCEDURE — 99495 TRANSJ CARE MGMT MOD F2F 14D: CPT | Performed by: PHYSICIAN ASSISTANT

## 2024-02-15 PROCEDURE — 36415 COLL VENOUS BLD VENIPUNCTURE: CPT | Performed by: PHYSICIAN ASSISTANT

## 2024-02-15 RX ORDER — DABIGATRAN ETEXILATE 75 MG/1
75 CAPSULE ORAL 2 TIMES DAILY
Qty: 60 CAPSULE | Refills: 0 | Status: SHIPPED | OUTPATIENT
Start: 2024-02-15 | End: 2024-02-29

## 2024-02-15 NOTE — LETTER
February 19, 2024      Shiraz Ingram  [address] [facility] [id number]        Dear ,    We are writing to inform you of your test results.    Your kidney function is abnormal but stable after hospital discharge.  I recommend following up with your primary care provider in approximately 2 months    Resulted Orders   Basic metabolic panel  (Ca, Cl, CO2, Creat, Gluc, K, Na, BUN)   Result Value Ref Range    Sodium 142 135 - 145 mmol/L      Comment:      Reference intervals for this test were updated on 09/26/2023 to more accurately reflect our healthy population. There may be differences in the flagging of prior results with similar values performed with this method. Interpretation of those prior results can be made in the context of the updated reference intervals.     Potassium 4.4 3.4 - 5.3 mmol/L    Chloride 101 98 - 107 mmol/L    Carbon Dioxide (CO2) 29 22 - 29 mmol/L    Anion Gap 12 7 - 15 mmol/L    Urea Nitrogen 56.8 (H) 8.0 - 23.0 mg/dL    Creatinine 1.75 (H) 0.67 - 1.17 mg/dL    GFR Estimate 37 (L) >60 mL/min/1.73m2    Calcium 9.3 8.8 - 10.2 mg/dL    Glucose 98 70 - 99 mg/dL       If you have any questions or concerns, please call the clinic at the number listed above.       Sincerely,      Ender Dos Santos PA-C

## 2024-02-15 NOTE — TELEPHONE ENCOUNTER
Patient was admitted to [facility] on 2/8/24 with dyspnea and bilateral lower extremity edema, and is found to have suspected acute CHF exacerbation causing acute pulmonary edema, possibly in the setting of holding diuretic on recent discharge and dietary indiscretion (recent trip).     PMH: CAD, HTN, PUD, chronic systolic CHF, prior TAVR, prior pacemaker placement, deaf, chronic atrial fibrillation on DOAC, and CKD Stage 3.    2/8/24: Echo showed EF of 55-60%, mildly dilated RV, moderately reduced RV systolic function, normal prosthetic aortic valve gradient, RV systolic pressure elevated consistent with moderate pulmonary hypertension.     IV Lasix diuresed.    Pt was started on decreased Pradaxa dosage. Imdur and Hydralazine dosages were increased and Torsemide to be taken daily at time of discharge.    Pt is scheduled for an OV on 2/20/24 at 0735, followed with a PPM check at 0815 at our [place] Office.    Writer attempted to call pt's son, Willi, for a cardiology post discharge phone call, but no answer. VM left to return my call. JAVID Ding RN.

## 2024-02-15 NOTE — PROGRESS NOTES
Assessment & Plan     (Z09) Hospital discharge follow-up  (primary encounter diagnosis)  Comment:   Plan:     (I50.33) Acute on chronic diastolic congestive heart failure (H)  Comment:   Plan: Patient's weight is down since diuresis in the hospital, tolerating current dose of torsemide, still staying hydrated throughout the day even with increased urination.  Some decreased diastolic blood pressures and lower pulse today with increase of metoprolol, increased Imdur, patient denies any increased dizziness, he is continue to check his blood pressure and his weight at home, he has follow-up with cardiology in approximately 5 days.    (I25.10) Coronary artery disease involving native coronary artery of native heart without angina pectoris  Comment:   Plan: dabigatran ANTICOAGULANT (PRADAXA) 75 MG         capsule        Stable based on increased doses of Imdur, hydralazine and metoprolol    (D61.818) Pancytopenia (H)  Comment:   Plan: Followed by hematology no upcoming appointment scheduled    (J96.01) Acute respiratory failure with hypoxia (H)  Comment:   Plan: Patient noting improvement in his breathing with the addition of supplemental oxygen in the hospital, advised to get a pulse oximeter and to monitor this at home once daily or if experiencing any shortness of breath    (I48.20) Chronic atrial fibrillation (H)  Comment:   Plan: Continue with Pradaxa and metoprolol, follow-up with cardiology in 1 week    (D69.6) Thrombocytopenia, unspecified (H24)  Comment:   Plan: See pancytopenia above, hemoglobin has trended down, advise close follow-up with hematology, possible bone marrow biopsy based on suspicion of myelodysplastic syndrome in the future    (N18.32) Stage 3b chronic kidney disease (H)  Comment:   Plan: Basic metabolic panel  (Ca, Cl, CO2, Creat,         Gluc, K, Na, BUN)        Recheck BMP today, creatinine has trended down since hospital admission, stressed the importance again of staying adequately  "hydrated          MED REC REQUIRED  Post Medication Reconciliation Status:     BMI  Estimated body mass index is 22.94 kg/m  as calculated from the following:    Height as of 12/27/23: 1.664 m (5' 5.5\").    Weight as of this encounter: 63.5 kg (140 lb).         FUTURE APPOINTMENTS:       - Follow-up visit in May with PCP, sooner if needed after card visit    Javon Weldon is a 89 year old, presenting for the following health issues:  Hospital F/U        2/15/2024    11:35 AM   Additional Questions   Roomed by JAMES Garcia   Accompanied by self     HPI         2/12/2024     1:12 PM   Post Discharge Outreach   Admission Date 2/8/2024   Reason for Admission Suspected acute on chronic diastolic CHF exacerbation causing acute pulmonary edema   Discharge Date 2/11/2024   Discharge Diagnosis Suspected acute on chronic diastolic CHF exacerbation causing acute pulmonary edema   How are you doing now that you are home? doing well   How are your symptoms? (Red Flag symptoms escalate to triage hotline per guidelines) Improved   Do you feel your condition is stable enough to be safe at home until your provider visit? Yes   Does the patient have their discharge instructions?  Yes   Does the patient have questions regarding their discharge instructions?  No   Were you started on any new medications or were there changes to any of your previous medications?  Yes   Does the patient have all of their medications? Yes   Do you have questions regarding any of your medications?  No   Do you have all of your needed medical supplies or equipment (DME)?  (i.e. oxygen tank, CPAP, cane, etc.) Yes   Discharge follow-up appointment scheduled within 14 calendar days?  Yes   Discharge Follow Up Appointment Date 2/20/2024   Discharge Follow Up Appointment Scheduled with? Specialty Care Provider     Hospital Follow-up Visit:    Hospital/Nursing Home/IP Rehab Facility: [facility]  Date of Admission: 2/8/2024   Date of " Discharge: 2/11/2024  Reason(s) for Admission: Suspected acute on chronic diastolic CHF exacerbation causing acute pulmonary edema     Was your hospitalization related to COVID-19? No   Problems taking medications regularly:  no  Medication changes since discharge: yes  Problems adhering to non-medication therapy:  None    Summary of hospitalization:  [facility] discharge summary reviewed  Diagnostic Tests/Treatments reviewed.  Follow up needed: BMP repeat  Other Healthcare Providers Involved in Patient s Care:         Specialist appointment - cardiology next week  Update since discharge: improved.     PLAN      Suspected acute on chronic diastolic CHF exacerbation causing acute pulmonary edema:   Moderate pulmonary hypertension   Of note, Mr. Ingram is followed by [facility] for CAD, status post prior CABG, as well as chronic atrial fibrillation, aortic stenosis status post TAVR, and chronic biventricular CHF with reduced EF. Last TTE from 6/2022 showed LVEF 45-50%, mild pulmonary hypertension, and 9 mmHg bioprosthetic valve gradient. At his last visit with Cardiology in 7/2023 he was noted to be feeling well, on Torsemide 10-15 mg daily.  Most recently, it seems he was hospitalized in [place], [place], for dyspnea. There is an incomplete paper chart with him from that hospitalization that shows Cr 2.00 and report of mild myonecrosis, negative COVID and Influenza testing, CXR that showed pulmonary vascular congestion. His son says he was treated for 3 days and told to follow up with his Cardiology team here at discharge to determine if his Torsemide dose should be changed. He was discharged 1/22/24. Unclear if he has been taking torsemide.     Now, he presents for evaluation of recurrent acute dyspnea and worsening bilateral lower extremity edema . In the ED, he is afebrile, not hypoxic at rest. He has irregular bradycardia and accelerated hypertension. He has chronic leukopenia and anemia as  "discussed below. Pro-BNP is 6643, and had been 4099 on 12/25/23. EKG confirms atrial fibrillation with slow VR and intermittent pacing. Troponin T is 59, unchanged from recent previous Troponin checks. CXR shows interstitial pulmonary edema. Overall, his symptoms are consistent with acute CHF exacerbation causing acute pulmonary edema. His myonecrosis seems to be chronic, in the setting of CKD. ACS ruled out as serial troponins remained flat.(59>73).  Acute CHF exacerbation could be due to uncontrolled HTN.       -- Inpatient. Telemetry, TTE ordered. Cardiology consulted. Diurese with Lasix IV (dose reduced to 60 bid on 2/9),  Strict I and O's, daily weights, and low sodium diet ordered.  --Repeat TTE on 2/8 showed EF 55-60%, mildly dilated RV, moderately reduced RV systolic function, normal prosthetic aortic valve gradient, RV systolic pressure elevated consistent with moderate pulmonary hypertension.    --Creatinine 2 on 2/9.  --Intermittently on 1 to 2 L nasal cannula oxygen more for patient's symptoms. Not on O2 on 2/9. Feeling much improved with breathing.  --He will need close cardiology follow-up for diuretic titration.  Creatinine improved slightly to 1.9 on 2/10.  Switched to p.o. torsemide 10 Mg twice daily on this day.  -- 2/11: Torsemide 10 Mg in the morning, 5 Mg in the afternoon per cardiology.  Stable for discharge home today.  Home oxygen assessment completed and patient will discharge with home oxygen with activity.  Follow-up with PCP and cardiology, recheck BMP as outpatient.     Stage 3 CKD: Noted. He could have cardiorenal syndrome. Monitor closely while hospitalized and diuresing.   Creat 1.7 on 2/8.  2 on 2/9.  1.8 on 2/11.     CAD: He is status post CABG in 2002 [LIMA -LAD, VG-OM and the RCA]. Most recent cardiac catheterization 4/2020 reportedly revealed: \"Moderate-severe touchdown lesion of the right PDA branch of the WSH-rVGM-zGT Y-graft. Otherwise widely patent LIMA-LAD, SVG-OM, and rPL " "Y-graft branch.\"     -- Resumed home ASA.     Accelerated hypertension: Without clear signs of hypertensive crisis.  SBP in the 190s at admission.  Currently much better controlled and in normal range.    -- Resumed home Hydralazine, Imdur, and Metoprolol.  Hydralazine and Imdur dose doubled per cardiology.  Will continue on increased dose at discharge.  See torsemide dosing as noted above.     Hyperlipidemia: Resumed home Zocor.     Chronic atrial fibrillation and MAYA thrombus: Currently bradycardic. He has a pacemaker in place as discussed below.    -- Resumed home Pradaxa.  Dose reduced based on renal function.  Will discharge on dose reduced Pradaxa per renal function.  This will be need to be followed up by PCP/cardiology regarding appropriately dosing Pradaxa based on renal function.     Aortic stenosis status post TAVR: 29mm Medtronic Evolut Pro, implanted  7/2020. That was complicated by heart block, leading to placement of permanent pacemaker as well. Noted     Chronic anemia and leukopenia: He is followed by Dr. Morris of Hematology for known lung nodules and renal masses, as well as chronic pancytopenia, which is being monitored serially. MDS has been thought possible, and if it worsens, the plan has been to consider bone marrow biopsy.  Currently counts appear to be at baseline.    -- Monitor CBC while hospitalized; resume home iron supplementation at discharge         PUD: He was seen 5/2020 by Dr. Fournier of GI when hospitalized for acute upper GI bleeding, and EGD showed a bleeding esophageal ulcer that was cauterized.     -- Resume home BID PPI      Asthma, moderate persistent: By history: No signs of wheezing at present. Resume home inhalers and Singulair      History of SBO due to incarcerated hernia: Status post open surgical repair in 1/2023. Noted    Plan of care communicated with patient    TODAY:    Patient reports that he is feeling much improved since hospital discharge, he is tolerating his " torsemide dose, still working on staying well-hydrated to prevent further kidney damage with CKD stage IIIb.  Patient's weight has been monitored daily, now down to 140 pounds.    He continues to use supplemental oxygen when up and around, using a walker outside of the home today.     He denies any increased shortness of breath, no chest pain, no PND or increased lower extremity edema symptoms today.  No positional dizziness noted by the patient    He does need a refill of his decreased dose of Pradaxa           Wt Readings from Last 5 Encounters:   02/15/24 63.5 kg (140 lb)   02/11/24 66.2 kg (146 lb)   12/27/23 69.7 kg (153 lb 9.6 oz)   12/25/23 68 kg (150 lb)   11/08/23 68 kg (150 lb)                 Objective    BP (!) 158/60 (BP Location: Right arm, Patient Position: Sitting, Cuff Size: Adult Regular)   Pulse 56   Temp 98.2  F (36.8  C) (Temporal)   Resp 21   Wt 63.5 kg (140 lb)   SpO2 99%   BMI 22.94 kg/m    Body mass index is 22.94 kg/m .  Physical Exam   GENERAL: alert and no distress.  Wearing supplemental oxygen via nasal cannula, using walker to ambulate  NECK: no adenopathy, no asymmetry, masses, or scars  RESP: lungs clear to auscultation - no rales, rhonchi or wheezes  CV: regular rate and rhythm, normal S1 S2, no S3 or S4, no murmur, click or rub, no peripheral edema  ABDOMEN: soft, nontender, no hepatosplenomegaly, no masses and bowel sounds normal  MS: no gross musculoskeletal defects noted, no edema            Signed Electronically by: Ender Dos Santos PA-C

## 2024-02-16 NOTE — TELEPHONE ENCOUNTER
Writer attempted to return son, Darron, message, but now, no answer.     VM left to call back with any pt non emergent cardiac related or medication questions.    Reminder left of appt's as scheduled below. Writer notes pt was seen by PMD today.    Writer's phone number again provided. JAVID Ding RN.

## 2024-02-20 ENCOUNTER — ANCILLARY PROCEDURE (OUTPATIENT)
Dept: CARDIOLOGY | Facility: CLINIC | Age: 89
End: 2024-02-20
Attending: INTERNAL MEDICINE
Payer: COMMERCIAL

## 2024-02-20 ENCOUNTER — OFFICE VISIT (OUTPATIENT)
Dept: CARDIOLOGY | Facility: CLINIC | Age: 89
End: 2024-02-20
Payer: COMMERCIAL

## 2024-02-20 VITALS
DIASTOLIC BLOOD PRESSURE: 75 MMHG | BODY MASS INDEX: 22.56 KG/M2 | HEIGHT: 66 IN | WEIGHT: 140.4 LBS | SYSTOLIC BLOOD PRESSURE: 151 MMHG | OXYGEN SATURATION: 96 % | HEART RATE: 54 BPM

## 2024-02-20 DIAGNOSIS — Z95.0 CARDIAC PACEMAKER IN SITU: ICD-10-CM

## 2024-02-20 DIAGNOSIS — I49.5 SSS (SICK SINUS SYNDROME) (H): ICD-10-CM

## 2024-02-20 DIAGNOSIS — I50.9 ACUTE ON CHRONIC CONGESTIVE HEART FAILURE, UNSPECIFIED HEART FAILURE TYPE (H): Primary | ICD-10-CM

## 2024-02-20 PROCEDURE — T1013 SIGN LANG/ORAL INTERPRETER: HCPCS | Mod: U3

## 2024-02-20 PROCEDURE — 99214 OFFICE O/P EST MOD 30 MIN: CPT | Performed by: INTERNAL MEDICINE

## 2024-02-20 PROCEDURE — 93279 PRGRMG DEV EVAL PM/LDLS PM: CPT | Performed by: INTERNAL MEDICINE

## 2024-02-20 NOTE — LETTER
2/20/2024    Dany Rodriguez MD, MD  [address]  [place] [address]    RE: Shiraz Ingram       Dear Colleague,     I had the pleasure of seeing Shiraz Ingram in the [facility].  HPI and Plan:   It is always a pleasure to see Mr. Ingram.  He is accompanied by a  as he is very hard of hearing.  He has a multitude of cardiac and medical problems as listed below but he is here for follow-up of an recent episode of diastolic heart failure exacerbation.    History as outlined below.    1. 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   2.  CKD-III; with a creatinine at baseline ~1.4.   3.  Hypertension, controlled.   4.  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  Mean gradient usually around 9 mmHg.    5.  Chronic atrial fibrillation with history of MAYA thrombus, on anticoagulation with Pradaxa.  Controlled heart rate.  6. CAD, s/p CABG. 2002 [LIMA -LAD, VG-OM and the RCA  7. Hyperlipidemia, well-controlled as of 9/2021.  8. JOSÉ ANTONIO diagnosed 2022.  9.  Small bowel obstruction January 2023.    When I last saw him in July 2023 he was doing well.  His weight at that time was 150 pounds.  He is now 140 pounds    At one time this gentleman was admitted frequently with episodes of heart failure exacerbation labeled as diastolic.  He has not had CHF hospitalizations actually for at least more than a year and a half    I am happy to hear that he had a nice time in [place].  He drove with his son from here to [place] and along the way he knew he ate much more salt and much more food than he should.  To me he liked Grou[employer] soup and fish always had a lot of salt in it.  Unsurprisingly he was hospitalized in [place] for congestive heart failure and there was worsening his creatinine as well from about 1.7-2.0.    Did feel a lot better coming back from [place] and he was hospitalized again [facility].  He was noted  to be hypertensive during the hospitalization and my colleagues increased his Imdur from 30 to 60 mg this increase in dose is well-tolerated.  He showed me his blood pressure readings at home and I think around 1 40-1 50 systolic with diastolics in the 60s and 70s should be just fine.    He is back to baseline.  He lives at home with his son and he is doing well there.  He is able to do his activities of daily living without experiencing shortness of breath or other heart failure symptoms.  He certainly appears euvolemic by physical exam.    At this time I have not made any adjustments to his medications.  He takes 10 mg of Demadex in the morning and 5 mg in the afternoon and this has been his longtime maintenance dose.    Most of can add any SGLT inhibitors this time as there was a clear precipitant for CHF exacerbation.  Cause could be an issue for this gentleman as well.  If there are further episodes of CHF exacerbations, we may consider adding it.    He asked me why his dabigatran dose has been reduced to 75 mg p.o. twice daily.  I suspect my colleagues reduced it due to his worsening renal function.  I will have his kidney function checked again in about 3 months time when he returns for clinic visit with my colleague Melissa Cevallos.    I have provisionally arranged to see him again in 6 months time for continued follow-up.            Orders Placed This Encounter   Procedures    Basic metabolic panel    Follow-Up with Cardiology DIAZ    Follow-Up with Cardiology       No orders of the defined types were placed in this encounter.      Encounter Diagnosis   Name Primary?    Acute on chronic congestive heart failure, unspecified heart failure type (H) Yes       CURRENT MEDICATIONS:  Current Outpatient Medications   Medication Sig Dispense Refill    acetaminophen (TYLENOL) 500 MG tablet Take 1,000 mg by mouth every 8 hours as needed      albuterol (PROAIR HFA/PROVENTIL HFA/VENTOLIN HFA) 108 (90 Base) MCG/ACT inhaler  INHALE 1 TO 2 PUFFS BY MOUTH EVERY 4 TO 6 HOURS AS NEEDED 18 g 2    aspirin (ASA) 81 MG EC tablet Take 81 mg by mouth 2 times daily 0700 1700      budesonide (PULMICORT) 0.5 MG/2ML neb solution Take 2 mLs (0.5 mg) by nebulization 2 times daily 120 mL 3    calcium carbonate 600 mg-vitamin D 400 units (CALTRATE) 600-400 MG-UNIT per tablet Take 1 tablet by mouth daily      dabigatran ANTICOAGULANT (PRADAXA) 75 MG capsule Take 1 capsule (75 mg) by mouth 2 times daily 60 capsule 0    hydrALAZINE (APRESOLINE) 25 MG tablet Take 1 tablet (25 mg) by mouth 3 times daily      ipratropium - albuterol 0.5 mg/2.5 mg/3 mL (DUONEB) 0.5-2.5 (3) MG/3ML neb solution Inhale 1 vial (3 mLs) into the lungs 4 times daily 360 mL 11    isosorbide mononitrate (IMDUR) 60 MG 24 hr tablet Take 1 tablet (60 mg) by mouth daily Can take 2 pills of home supply of 30 mg tabs to equal 60 mg daily to finish existing home supply. 30 tablet 0    magnesium chloride (MAG64) 535 (64 Mg) MG TBEC CR tablet Take 1 tablet (535 mg) by mouth 2 times daily 180 tablet 4    metoprolol tartrate (LOPRESSOR) 25 MG tablet Take 1 tablet (25 mg) by mouth 2 times daily 180 tablet 1    montelukast (SINGULAIR) 10 MG tablet Take 1 tablet (10 mg) by mouth daily 90 tablet 1    multivitamin, therapeutic (THERA-VIT) TABS tablet Take 1 tablet by mouth 2 times daily      nitroGLYcerin (NITROSTAT) 0.4 MG sublingual tablet For chest pain place 1 tablet under the tongue every 5 minutes for 3 doses. If symptoms persist 5 minutes after 1st dose call 911. 12 tablet 0    pantoprazole (PROTONIX) 40 MG EC tablet TAKE 1 TABLET BEFORE MEALS TWICE A DAY Strength: 40 mg 180 tablet 1    simvastatin (ZOCOR) 40 MG tablet Take 1 tablet (40 mg) by mouth At Bedtime 90 tablet 4    torsemide (DEMADEX) 5 MG tablet Take 10 mg every morning and 5 mg every afternoon         ALLERGIES   No Known Allergies    PAST MEDICAL HISTORY:  Past Medical History:   Diagnosis Date    Allergic rhinitis, cause  unspecified     Anemia, unspecified     Aortic stenosis S/P TAVR     Benign neoplasm of colon 1999    Ademonatous polyp    CHF 2nd to TAVR -- S/P Pacemaker 12/2020    CKD (chronic kidney disease) stage 3, GFR 30-59 ml/min (H) 05/12/2011    Community acquired pneumonia 09/29/2020    Coronary atherosclerosis of unspecified type of vessel, native or graft     s/p CABG 2002    Esophageal ulcer w UGI bleed 05/24/2020    Had EGD adn caurtery 5/24/20, colonoscopy with diverticulosis then    Hyperlipidemia LDL goal < 100     Hypertension goal BP (blood pressure) < 140/90     Localized osteoarthrosis not specified whether primary or secondary, lower leg     left knee    Mild persistent asthma without complication 01/11/2016    Moderate persistent asthma     JOSÉ ANTONIO (obstructive sleep apnea)     Persistent atrial fibrillation (H)     Unspecified hearing loss        PAST SURGICAL HISTORY:  Past Surgical History:   Procedure Laterality Date    CARDIAC SURGERY      COLONOSCOPY N/A 5/26/2020    Procedure: COLONOSCOPY;  Surgeon: Ignacio Fournier MD;  Location:  GI    CV ANGIOGRAM CORONARY GRAFT N/A 4/24/2020    Procedure: Angiogram Coronary Graft;  Surgeon: Addison Willard MD;  Location: [facility] CARDIAC CATH LAB    CV CORONARY ANGIOGRAM N/A 4/24/2020    Procedure: Coronary Angiogram;  Surgeon: Addison Willard MD;  Location:  HEART CARDIAC CATH LAB    CV RIGHT HEART CATH MEASUREMENTS RECORDED N/A 4/24/2020    Procedure: Right Heart Cath;  Surgeon: Addison Willard MD;  Location:  HEART CARDIAC CATH LAB    CV TRANSCATHETER AORTIC VALVE REPLACEMENT N/A 7/14/2020    Procedure: Transcatheter Aortic Valve Replacement;  Surgeon: Soraida Monet MD;  Location:  HEART CARDIAC CATH LAB    EP PACEMAKER N/A 7/15/2020    Procedure: EP Pacemaker;  Surgeon: Tommie Sauer MD;  Location:  HEART CARDIAC CATH LAB    HC ESOPHAGOSCOPY, DIAGNOSTIC  5/03    Dr. Dow, lower esophageal ring & mild gastritis     HERNIORRHAPHY INGUINAL Right 2016    Procedure: HERNIORRHAPHY INGUINAL;  Surgeon: Alexis Alexandra MD;  Location:  SD    HERNIORRHAPHY INGUINAL Left 2023    Procedure: LEFT GROIN EXPLORATION, SEGMENTAL SMALL BOWEL RESECTION, TRANSINGUINAL REPAIR OF INCARCERATED/STRANGULATED HERNIA;  Surgeon: Dwayne Russell MD;  Location:  OR    LAPAROSCOPIC CHOLECYSTECTOMY      for biliary sludge    REVERSE ARTHROPLASTY SHOULDER Right 2022    Procedure: RIGHT REVERSE SHOULDER ARTHROPLASTY, WITH OPEN REDUCTION INTERNAL FIXATION, WITH NO CUSTOM GUIDE;  Surgeon: Wiley Vargas MD;  Location:  OR    ZZC NONSPECIFIC PROCEDURE      Coronary artery bypass    ZZHC COLONOSCOPY THRU STOMA W BIOPSY/CAUTERY TUMOR/POLYP/LESION      Dr. Dow, Q 5 years    ZZHC COLONOSCOPY THRU STOMA W BIOPSY/CAUTERY TUMOR/POLYP/LESION      Dr. Dow, one adenomatous polyp       FAMILY HISTORY:  Family History   Problem Relation Age of Onset    C.A.D. Father     Cancer Mother         breast cancer,  of pneumonia    Cerebrovascular Disease Son     CABG Son     Family History Negative Child     Family History Negative Sister     Heart Disease Brother        SOCIAL HISTORY:  Social History     Socioeconomic History    Marital status:      Spouse name: Kailey    Number of children: 3   Occupational History    Occupation: [employer]     Employer: RETIRED   Tobacco Use    Smoking status: Never    Smokeless tobacco: Never    Tobacco comments:     smoked for a week in 20's   Vaping Use    Vaping Use: Never used   Substance and Sexual Activity    Alcohol use: Not Currently     Alcohol/week: 0.0 standard drinks of alcohol    Drug use: No    Sexual activity: Yes     Partners: Female   Other Topics Concern     Service No    Blood Transfusions No    Exercise Yes    Seat Belt Yes    Self-Exams No    Parent/sibling w/ CABG, MI or angioplasty before 65F 55M? Yes   Social History Narrative     Balanced Diet - Yes    Osteoporosis Preventative measures-  Dairy servings per day: 2 to 3 servings daily    Regular Exercise -  Yes Describe walks 1.5 mile daily     Dental Exam up - YES - Date: 2 years ago    Eye Exam - YES - Date: 1 year ago    Self Testicular Exam -  No, handout given    Do you have any concerns about STD's -  No    Abuse: Current or Past (Physical, Sexual or Emotional)- No    Do you feel safe in your environment - Yes    Guns stored in the home - Yes, locked    Sunscreen used - No    Seatbelts used - Yes    Lipids - YES - Date: 3-09    Glucose -  YES - Date: 3-09    Colon Cancer Screening - Colonoscopy 3-08(date completed)    Hemoccults - UNKNOWN    PSA - YES - Date: 11-07    Digital Rectal Exam - UNKNOWN    Immunizations reviewed and up to date - Yes, td 1-2005, had shingles vaccine    5-28-09  JANEY Patel CMA                     Social Determinants of Health     Financial Resource Strain: Low Risk  (12/27/2023)    Financial Resource Strain     Within the past 12 months, have you or your family members you live with been unable to get utilities (heat, electricity) when it was really needed?: No   Food Insecurity: Low Risk  (12/27/2023)    Food Insecurity     Within the past 12 months, did you worry that your food would run out before you got money to buy more?: No     Within the past 12 months, did the food you bought just not last and you didn t have money to get more?: No   Transportation Needs: Low Risk  (12/27/2023)    Transportation Needs     Within the past 12 months, has lack of transportation kept you from medical appointments, getting your medicines, non-medical meetings or appointments, work, or from getting things that you need?: No   Physical Activity: Insufficiently Active (10/28/2020)    Exercise Vital Sign     Days of Exercise per Week: 4 days     Minutes of Exercise per Session: 30 min   Social Connections: Unknown (3/19/2021)    Social Connection and Isolation Panel [NHANES]      "Frequency of Communication with Friends and Family: More than three times a week     Marital Status:    Interpersonal Safety: Low Risk  (11/1/2023)    Interpersonal Safety     Do you feel physically and emotionally safe where you currently live?: Yes     Within the past 12 months, have you been hit, slapped, kicked or otherwise physically hurt by someone?: No     Within the past 12 months, have you been humiliated or emotionally abused in other ways by your partner or ex-partner?: No   Housing Stability: Low Risk  (12/27/2023)    Housing Stability     Do you have housing? : Yes     Are you worried about losing your housing?: No   Recent Concern: Housing Stability - High Risk (11/1/2023)    Housing Stability     Do you have housing? : No     Are you worried about losing your housing?: No       Review of Systems:  Skin:  Negative     Eyes:  Positive for glasses  ENT:  Positive for deafness  Respiratory:  Positive for    Cardiovascular:  Negative;palpitations;chest pain;syncope or near-syncope;cyanosis;dizziness;lightheadedness Positive for;edema  Gastroenterology: Negative    Genitourinary:  Positive for urinary frequency  Musculoskeletal:  Positive for joint pain  Neurologic:  Negative    Psychiatric:  Negative    Heme/Lymph/Imm:  Negative    Endocrine:  Negative      Physical Exam:  Vitals: BP (!) 151/75   Pulse 54   Ht 1.664 m (5' 5.5\")   Wt 63.7 kg (140 lb 6.4 oz)   SpO2 96%   BMI 23.01 kg/m      Constitutional:  in no acute distress chronically ill      Skin:  warm and dry to the touch venous stasis changes pacemaker incision in the left infraclavicular area was well-healed      Head:  normocephalic, no masses or lesions        Eyes:  pupils equal and round, conjunctivae and lids unremarkable, sclera white, no xanthalasma, EOMS intact, no nystagmus        Lymph:No Cervical lymphadenopathy present     ENT:  no pallor or cyanosis, dentition good        Neck:  JVP normal        Respiratory:  normal " breath sounds, clear to auscultation, normal A-P diameter, normal symmetry, normal respiratory excursion, no use of accessory muscles         Cardiac: apical impulse not displaced;normal S1 and S2 irregular rhythm soft or inaudible A2   systolic murmur grade 1        pulses below the femoral arteries are diminished                                      GI:  abdomen soft, non-tender, BS normoactive, no mass, no HSM, no bruits        Extremities and Muscular Skeletal:  no deformities, clubbing, cyanosis, erythema observed   bilateral LE edema;1+          Neurological:  no gross motor deficits        Psych:  Alert and Oriented x 3        Recent Lab Results:  LIPID RESULTS:  Lab Results   Component Value Date    CHOL 126 11/01/2023    CHOL 119 06/01/2020    HDL 43 11/01/2023    HDL 48 06/01/2020    LDL 63 11/01/2023    LDL 54 06/01/2020    TRIG 98 11/01/2023    TRIG 87 06/01/2020    CHOLHDLRATIO 3.0 07/07/2015       LIVER ENZYME RESULTS:  Lab Results   Component Value Date    AST 26 11/01/2023    AST 25 03/15/2021    ALT 13 11/01/2023    ALT 38 03/15/2021       CBC RESULTS:  Lab Results   Component Value Date    WBC 3.5 (L) 02/10/2024    WBC 3.2 (L) 06/07/2021    RBC 4.54 02/10/2024    RBC 4.89 06/07/2021    HGB 8.7 (L) 02/10/2024    HGB 10.3 (L) 06/07/2021    HCT 28.9 (L) 02/10/2024    HCT 33.5 (L) 06/07/2021    MCV 64 (L) 02/10/2024    MCV 69 (L) 06/07/2021    MCH 19.2 (L) 02/10/2024    MCH 21.1 (L) 06/07/2021    MCHC 30.1 (L) 02/10/2024    MCHC 30.7 (L) 06/07/2021    RDW 17.7 (H) 02/10/2024    RDW 16.9 (H) 06/07/2021     02/10/2024     (L) 06/07/2021       BMP RESULTS:  Lab Results   Component Value Date     02/15/2024     06/07/2021    POTASSIUM 4.4 02/15/2024    POTASSIUM 4.9 01/16/2023    POTASSIUM 4.3 06/07/2021    CHLORIDE 101 02/15/2024    CHLORIDE 108 01/16/2023    CHLORIDE 104 06/07/2021    CO2 29 02/15/2024    CO2 27 01/16/2023    CO2 27 06/07/2021    ANIONGAP 12 02/15/2024     "ANIONGAP 5 01/16/2023    ANIONGAP 5 06/07/2021    GLC 98 02/15/2024    GLC 87 01/16/2023    GLC 97 06/07/2021    BUN 56.8 (H) 02/15/2024    BUN 77 (H) 01/16/2023    BUN 54 (H) 06/07/2021    CR 1.75 (H) 02/15/2024    CR 1.72 (H) 06/07/2021    GFRESTIMATED 37 (L) 02/15/2024    GFRESTIMATED 35 (L) 06/07/2021    GFRESTBLACK 41 (L) 06/07/2021    AYALA 9.3 02/15/2024    AYALA 8.9 06/07/2021        A1C RESULTS:  No results found for: \"A1C\"    INR RESULTS:  Lab Results   Component Value Date    INR 1.51 (H) 03/15/2021    INR 1.21 (H) 07/13/2020           CC  Sandra Field MD  [address]  [place] [address]      Thank you for allowing me to participate in the care of your patient.      Sincerely,     DR SANDRA FIELD MD     [facility]  "

## 2024-02-20 NOTE — PROGRESS NOTES
HPI and Plan:   It is always a pleasure to see Mr. Ingram.  He is accompanied by a  as he is very hard of hearing.  He has a multitude of cardiac and medical problems as listed below but he is here for follow-up of an recent episode of diastolic heart failure exacerbation.    History as outlined below.    1. 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   2.  CKD-III; with a creatinine at baseline ~1.4.   3.  Hypertension, controlled.   4.  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  Mean gradient usually around 9 mmHg.    5.  Chronic atrial fibrillation with history of MAYA thrombus, on anticoagulation with Pradaxa.  Controlled heart rate.  6. CAD, s/p CABG. 2002 [LIMA -LAD, VG-OM and the RCA  7. Hyperlipidemia, well-controlled as of 9/2021.  8. JOSÉ ANTONIO diagnosed 2022.  9.  Small bowel obstruction January 2023.    When I last saw him in July 2023 he was doing well.  His weight at that time was 150 pounds.  He is now 140 pounds    At one time this gentleman was admitted frequently with episodes of heart failure exacerbation labeled as diastolic.  He has not had CHF hospitalizations actually for at least more than a year and a half    I am happy to hear that he had a nice time in [place].  He drove with his son from here to [place] and along the way he knew he ate much more salt and much more food than he should.  To me he liked Foreverer soup and fish always had a lot of salt in it.  Unsurprisingly he was hospitalized in [place] for congestive heart failure and there was worsening his creatinine as well from about 1.7-2.0.    Did feel a lot better coming back from [place] and he was hospitalized again [facility].  He was noted to be hypertensive during the hospitalization and my colleagues increased his Imdur from 30 to 60 mg this increase in dose is well-tolerated.  He showed me his blood pressure readings at home and I think around 1 40-1  50 systolic with diastolics in the 60s and 70s should be just fine.    He is back to baseline.  He lives at home with his son and he is doing well there.  He is able to do his activities of daily living without experiencing shortness of breath or other heart failure symptoms.  He certainly appears euvolemic by physical exam.    At this time I have not made any adjustments to his medications.  He takes 10 mg of Demadex in the morning and 5 mg in the afternoon and this has been his longtime maintenance dose.    Most of can add any SGLT inhibitors this time as there was a clear precipitant for CHF exacerbation.  Cause could be an issue for this gentleman as well.  If there are further episodes of CHF exacerbations, we may consider adding it.    He asked me why his dabigatran dose has been reduced to 75 mg p.o. twice daily.  I suspect my colleagues reduced it due to his worsening renal function.  I will have his kidney function checked again in about 3 months time when he returns for clinic visit with my colleague Melissa Cevallos.    I have provisionally arranged to see him again in 6 months time for continued follow-up.            Orders Placed This Encounter   Procedures    Basic metabolic panel    Follow-Up with Cardiology DIAZ    Follow-Up with Cardiology       No orders of the defined types were placed in this encounter.      Encounter Diagnosis   Name Primary?    Acute on chronic congestive heart failure, unspecified heart failure type (H) Yes       CURRENT MEDICATIONS:  Current Outpatient Medications   Medication Sig Dispense Refill    acetaminophen (TYLENOL) 500 MG tablet Take 1,000 mg by mouth every 8 hours as needed      albuterol (PROAIR HFA/PROVENTIL HFA/VENTOLIN HFA) 108 (90 Base) MCG/ACT inhaler INHALE 1 TO 2 PUFFS BY MOUTH EVERY 4 TO 6 HOURS AS NEEDED 18 g 2    aspirin (ASA) 81 MG EC tablet Take 81 mg by mouth 2 times daily 0700 1700      budesonide (PULMICORT) 0.5 MG/2ML neb solution Take 2 mLs (0.5 mg) by  nebulization 2 times daily 120 mL 3    calcium carbonate 600 mg-vitamin D 400 units (CALTRATE) 600-400 MG-UNIT per tablet Take 1 tablet by mouth daily      dabigatran ANTICOAGULANT (PRADAXA) 75 MG capsule Take 1 capsule (75 mg) by mouth 2 times daily 60 capsule 0    hydrALAZINE (APRESOLINE) 25 MG tablet Take 1 tablet (25 mg) by mouth 3 times daily      ipratropium - albuterol 0.5 mg/2.5 mg/3 mL (DUONEB) 0.5-2.5 (3) MG/3ML neb solution Inhale 1 vial (3 mLs) into the lungs 4 times daily 360 mL 11    isosorbide mononitrate (IMDUR) 60 MG 24 hr tablet Take 1 tablet (60 mg) by mouth daily Can take 2 pills of home supply of 30 mg tabs to equal 60 mg daily to finish existing home supply. 30 tablet 0    magnesium chloride (MAG64) 535 (64 Mg) MG TBEC CR tablet Take 1 tablet (535 mg) by mouth 2 times daily 180 tablet 4    metoprolol tartrate (LOPRESSOR) 25 MG tablet Take 1 tablet (25 mg) by mouth 2 times daily 180 tablet 1    montelukast (SINGULAIR) 10 MG tablet Take 1 tablet (10 mg) by mouth daily 90 tablet 1    multivitamin, therapeutic (THERA-VIT) TABS tablet Take 1 tablet by mouth 2 times daily      nitroGLYcerin (NITROSTAT) 0.4 MG sublingual tablet For chest pain place 1 tablet under the tongue every 5 minutes for 3 doses. If symptoms persist 5 minutes after 1st dose call 911. 12 tablet 0    pantoprazole (PROTONIX) 40 MG EC tablet TAKE 1 TABLET BEFORE MEALS TWICE A DAY Strength: 40 mg 180 tablet 1    simvastatin (ZOCOR) 40 MG tablet Take 1 tablet (40 mg) by mouth At Bedtime 90 tablet 4    torsemide (DEMADEX) 5 MG tablet Take 10 mg every morning and 5 mg every afternoon         ALLERGIES   No Known Allergies    PAST MEDICAL HISTORY:  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    CKD (chronic kidney disease) stage 3, GFR 30-59 ml/min (H)  05/12/2011    Community acquired pneumonia 09/29/2020    Coronary atherosclerosis of unspecified type of vessel, native or graft     s/p CABG 2002    Esophageal ulcer w UGI bleed 05/24/2020    Had EGD adn caurtery 5/24/20, colonoscopy with diverticulosis then    Hyperlipidemia LDL goal < 100     Hypertension goal BP (blood pressure) < 140/90     Localized osteoarthrosis not specified whether primary or secondary, lower leg     left knee    Mild persistent asthma without complication 01/11/2016    Moderate persistent asthma     JOSÉ ANTONIO (obstructive sleep apnea)     Persistent atrial fibrillation (H)     Unspecified hearing loss        PAST SURGICAL HISTORY:  Past Surgical History:   Procedure Laterality Date    CARDIAC SURGERY      COLONOSCOPY N/A 5/26/2020    Procedure: COLONOSCOPY;  Surgeon: Ignacio Fournier MD;  Location:  GI    CV ANGIOGRAM CORONARY GRAFT N/A 4/24/2020    Procedure: Angiogram Coronary Graft;  Surgeon: Addison Willard MD;  Location:  HEART CARDIAC CATH LAB    CV CORONARY ANGIOGRAM N/A 4/24/2020    Procedure: Coronary Angiogram;  Surgeon: Addison Willard MD;  Location:  HEART CARDIAC CATH LAB    CV RIGHT HEART CATH MEASUREMENTS RECORDED N/A 4/24/2020    Procedure: Right Heart Cath;  Surgeon: Addison Willard MD;  Location:  HEART CARDIAC CATH LAB    CV TRANSCATHETER AORTIC VALVE REPLACEMENT N/A 7/14/2020    Procedure: Transcatheter Aortic Valve Replacement;  Surgeon: Soraida Monet MD;  Location:  HEART CARDIAC CATH LAB    EP PACEMAKER N/A 7/15/2020    Procedure: EP Pacemaker;  Surgeon: Tommie Sauer MD;  Location:  HEART CARDIAC CATH LAB    HC ESOPHAGOSCOPY, DIAGNOSTIC  5/03    Dr. Dow, lower esophageal ring & mild gastritis    HERNIORRHAPHY INGUINAL Right 9/26/2016    Procedure: HERNIORRHAPHY INGUINAL;  Surgeon: Alexis Alexandra MD;  Location: [facility]    HERNIORRHAPHY INGUINAL Left 1/13/2023    Procedure: LEFT GROIN EXPLORATION, SEGMENTAL  SMALL BOWEL RESECTION, TRANSINGUINAL REPAIR OF INCARCERATED/STRANGULATED HERNIA;  Surgeon: Dwayne Russell MD;  Location:  OR    LAPAROSCOPIC CHOLECYSTECTOMY      for biliary sludge    REVERSE ARTHROPLASTY SHOULDER Right 2022    Procedure: RIGHT REVERSE SHOULDER ARTHROPLASTY, WITH OPEN REDUCTION INTERNAL FIXATION, WITH NO CUSTOM GUIDE;  Surgeon: Wiley Vargas MD;  Location:  OR    ZZC NONSPECIFIC PROCEDURE      Coronary artery bypass    ZZHC COLONOSCOPY THRU STOMA W BIOPSY/CAUTERY TUMOR/POLYP/LESION      Dr. Dow, Q 5 years    ZZHC COLONOSCOPY THRU STOMA W BIOPSY/CAUTERY TUMOR/POLYP/LESION      Dr. Dow, one adenomatous polyp       FAMILY HISTORY:  Family History   Problem Relation Age of Onset    C.A.D. Father     Cancer Mother         breast cancer,  of pneumonia    Cerebrovascular Disease Son     CABG Son     Family History Negative Child     Family History Negative Sister     Heart Disease Brother        SOCIAL HISTORY:  Social History     Socioeconomic History    Marital status:      Spouse name: Kailey    Number of children: 3   Occupational History    Occupation: [employer]     Employer: RETIRED   Tobacco Use    Smoking status: Never    Smokeless tobacco: Never    Tobacco comments:     smoked for a week in 20's   Vaping Use    Vaping Use: Never used   Substance and Sexual Activity    Alcohol use: Not Currently     Alcohol/week: 0.0 standard drinks of alcohol    Drug use: No    Sexual activity: Yes     Partners: Female   Other Topics Concern     Service No    Blood Transfusions No    Exercise Yes    Seat Belt Yes    Self-Exams No    Parent/sibling w/ CABG, MI or angioplasty before 65F 55M? Yes   Social History Narrative    Balanced Diet - Yes    Osteoporosis Preventative measures-  Dairy servings per day: 2 to 3 servings daily    Regular Exercise -  Yes Describe walks 1.5 mile daily     Dental Exam up - YES - Date: 2 years ago    Eye Exam - YES  - Date: 1 year ago    Self Testicular Exam -  No, handout given    Do you have any concerns about STD's -  No    Abuse: Current or Past (Physical, Sexual or Emotional)- No    Do you feel safe in your environment - Yes    Guns stored in the home - Yes, locked    Sunscreen used - No    Seatbelts used - Yes    Lipids - YES - Date: 3-09    Glucose -  YES - Date: 3-09    Colon Cancer Screening - Colonoscopy 3-08(date completed)    Hemoccults - UNKNOWN    PSA - YES - Date: 11-07    Digital Rectal Exam - UNKNOWN    Immunizations reviewed and up to date - Yes, td 1-2005, had shingles vaccine    5-28-09  JANEY Patel CMA                     Social Determinants of Health     Financial Resource Strain: Low Risk  (12/27/2023)    Financial Resource Strain     Within the past 12 months, have you or your family members you live with been unable to get utilities (heat, electricity) when it was really needed?: No   Food Insecurity: Low Risk  (12/27/2023)    Food Insecurity     Within the past 12 months, did you worry that your food would run out before you got money to buy more?: No     Within the past 12 months, did the food you bought just not last and you didn t have money to get more?: No   Transportation Needs: Low Risk  (12/27/2023)    Transportation Needs     Within the past 12 months, has lack of transportation kept you from medical appointments, getting your medicines, non-medical meetings or appointments, work, or from getting things that you need?: No   Physical Activity: Insufficiently Active (10/28/2020)    Exercise Vital Sign     Days of Exercise per Week: 4 days     Minutes of Exercise per Session: 30 min   Social Connections: Unknown (3/19/2021)    Social Connection and Isolation Panel [NHANES]     Frequency of Communication with Friends and Family: More than three times a week     Marital Status:    Interpersonal Safety: Low Risk  (11/1/2023)    Interpersonal Safety     Do you feel physically and emotionally  "safe where you currently live?: Yes     Within the past 12 months, have you been hit, slapped, kicked or otherwise physically hurt by someone?: No     Within the past 12 months, have you been humiliated or emotionally abused in other ways by your partner or ex-partner?: No   Housing Stability: Low Risk  (12/27/2023)    Housing Stability     Do you have housing? : Yes     Are you worried about losing your housing?: No   Recent Concern: Housing Stability - High Risk (11/1/2023)    Housing Stability     Do you have housing? : No     Are you worried about losing your housing?: No       Review of Systems:  Skin:  Negative     Eyes:  Positive for glasses  ENT:  Positive for deafness  Respiratory:  Positive for    Cardiovascular:  Negative;palpitations;chest pain;syncope or near-syncope;cyanosis;dizziness;lightheadedness Positive for;edema  Gastroenterology: Negative    Genitourinary:  Positive for urinary frequency  Musculoskeletal:  Positive for joint pain  Neurologic:  Negative    Psychiatric:  Negative    Heme/Lymph/Imm:  Negative    Endocrine:  Negative      Physical Exam:  Vitals: BP (!) 151/75   Pulse 54   Ht 1.664 m (5' 5.5\")   Wt 63.7 kg (140 lb 6.4 oz)   SpO2 96%   BMI 23.01 kg/m      Constitutional:  in no acute distress chronically ill      Skin:  warm and dry to the touch venous stasis changes pacemaker incision in the left infraclavicular area was well-healed      Head:  normocephalic, no masses or lesions        Eyes:  pupils equal and round, conjunctivae and lids unremarkable, sclera white, no xanthalasma, EOMS intact, no nystagmus        Lymph:No Cervical lymphadenopathy present     ENT:  no pallor or cyanosis, dentition good        Neck:  JVP normal        Respiratory:  normal breath sounds, clear to auscultation, normal A-P diameter, normal symmetry, normal respiratory excursion, no use of accessory muscles         Cardiac: apical impulse not displaced;normal S1 and S2 irregular rhythm soft or " inaudible A2   systolic murmur grade 1        pulses below the femoral arteries are diminished                                      GI:  abdomen soft, non-tender, BS normoactive, no mass, no HSM, no bruits        Extremities and Muscular Skeletal:  no deformities, clubbing, cyanosis, erythema observed   bilateral LE edema;1+          Neurological:  no gross motor deficits        Psych:  Alert and Oriented x 3        Recent Lab Results:  LIPID RESULTS:  Lab Results   Component Value Date    CHOL 126 11/01/2023    CHOL 119 06/01/2020    HDL 43 11/01/2023    HDL 48 06/01/2020    LDL 63 11/01/2023    LDL 54 06/01/2020    TRIG 98 11/01/2023    TRIG 87 06/01/2020    CHOLHDLRATIO 3.0 07/07/2015       LIVER ENZYME RESULTS:  Lab Results   Component Value Date    AST 26 11/01/2023    AST 25 03/15/2021    ALT 13 11/01/2023    ALT 38 03/15/2021       CBC RESULTS:  Lab Results   Component Value Date    WBC 3.5 (L) 02/10/2024    WBC 3.2 (L) 06/07/2021    RBC 4.54 02/10/2024    RBC 4.89 06/07/2021    HGB 8.7 (L) 02/10/2024    HGB 10.3 (L) 06/07/2021    HCT 28.9 (L) 02/10/2024    HCT 33.5 (L) 06/07/2021    MCV 64 (L) 02/10/2024    MCV 69 (L) 06/07/2021    MCH 19.2 (L) 02/10/2024    MCH 21.1 (L) 06/07/2021    MCHC 30.1 (L) 02/10/2024    MCHC 30.7 (L) 06/07/2021    RDW 17.7 (H) 02/10/2024    RDW 16.9 (H) 06/07/2021     02/10/2024     (L) 06/07/2021       BMP RESULTS:  Lab Results   Component Value Date     02/15/2024     06/07/2021    POTASSIUM 4.4 02/15/2024    POTASSIUM 4.9 01/16/2023    POTASSIUM 4.3 06/07/2021    CHLORIDE 101 02/15/2024    CHLORIDE 108 01/16/2023    CHLORIDE 104 06/07/2021    CO2 29 02/15/2024    CO2 27 01/16/2023    CO2 27 06/07/2021    ANIONGAP 12 02/15/2024    ANIONGAP 5 01/16/2023    ANIONGAP 5 06/07/2021    GLC 98 02/15/2024    GLC 87 01/16/2023    GLC 97 06/07/2021    BUN 56.8 (H) 02/15/2024    BUN 77 (H) 01/16/2023    BUN 54 (H) 06/07/2021    CR 1.75 (H) 02/15/2024    CR 1.72 (H)  "06/07/2021    GFRESTIMATED 37 (L) 02/15/2024    GFRESTIMATED 35 (L) 06/07/2021    GFRESTBLACK 41 (L) 06/07/2021    AYALA 9.3 02/15/2024    AYALA 8.9 06/07/2021        A1C RESULTS:  No results found for: \"A1C\"    INR RESULTS:  Lab Results   Component Value Date    INR 1.51 (H) 03/15/2021    INR 1.21 (H) 07/13/2020           CC  Jaxon Field MD  [address]  [place] [address]                 "

## 2024-02-23 LAB
MDC_IDC_EPISODE_DTM: NORMAL
MDC_IDC_EPISODE_DURATION: 1 S
MDC_IDC_EPISODE_ID: 6
MDC_IDC_EPISODE_TYPE: NORMAL
MDC_IDC_EPISODE_TYPE_INDUCED: NO
MDC_IDC_LEAD_CONNECTION_STATUS: NORMAL
MDC_IDC_LEAD_IMPLANT_DT: NORMAL
MDC_IDC_LEAD_LOCATION: NORMAL
MDC_IDC_LEAD_LOCATION_DETAIL_1: NORMAL
MDC_IDC_LEAD_MFG: NORMAL
MDC_IDC_LEAD_MODEL: NORMAL
MDC_IDC_LEAD_POLARITY_TYPE: NORMAL
MDC_IDC_LEAD_SERIAL: NORMAL
MDC_IDC_MSMT_BATTERY_DTM: NORMAL
MDC_IDC_MSMT_BATTERY_REMAINING_LONGEVITY: 139 MO
MDC_IDC_MSMT_BATTERY_RRT_TRIGGER: 2.62
MDC_IDC_MSMT_BATTERY_STATUS: NORMAL
MDC_IDC_MSMT_BATTERY_VOLTAGE: 3.04 V
MDC_IDC_MSMT_LEADCHNL_RV_IMPEDANCE_VALUE: 342 OHM
MDC_IDC_MSMT_LEADCHNL_RV_IMPEDANCE_VALUE: 380 OHM
MDC_IDC_MSMT_LEADCHNL_RV_PACING_THRESHOLD_AMPLITUDE: 0.62 V
MDC_IDC_MSMT_LEADCHNL_RV_PACING_THRESHOLD_PULSEWIDTH: 0.4 MS
MDC_IDC_MSMT_LEADCHNL_RV_SENSING_INTR_AMPL: 11 MV
MDC_IDC_MSMT_LEADCHNL_RV_SENSING_INTR_AMPL: 8.75 MV
MDC_IDC_PG_IMPLANT_DTM: NORMAL
MDC_IDC_PG_MFG: NORMAL
MDC_IDC_PG_MODEL: NORMAL
MDC_IDC_PG_SERIAL: NORMAL
MDC_IDC_PG_TYPE: NORMAL
MDC_IDC_SESS_CLINIC_NAME: NORMAL
MDC_IDC_SESS_DTM: NORMAL
MDC_IDC_SESS_TYPE: NORMAL
MDC_IDC_SET_BRADY_HYSTRATE: NORMAL
MDC_IDC_SET_BRADY_LOWRATE: 50 {BEATS}/MIN
MDC_IDC_SET_BRADY_MODE: NORMAL
MDC_IDC_SET_LEADCHNL_RV_PACING_AMPLITUDE: 2 V
MDC_IDC_SET_LEADCHNL_RV_PACING_ANODE_ELECTRODE_1: NORMAL
MDC_IDC_SET_LEADCHNL_RV_PACING_ANODE_LOCATION_1: NORMAL
MDC_IDC_SET_LEADCHNL_RV_PACING_CAPTURE_MODE: NORMAL
MDC_IDC_SET_LEADCHNL_RV_PACING_CATHODE_ELECTRODE_1: NORMAL
MDC_IDC_SET_LEADCHNL_RV_PACING_CATHODE_LOCATION_1: NORMAL
MDC_IDC_SET_LEADCHNL_RV_PACING_POLARITY: NORMAL
MDC_IDC_SET_LEADCHNL_RV_PACING_PULSEWIDTH: 0.4 MS
MDC_IDC_SET_LEADCHNL_RV_SENSING_ANODE_ELECTRODE_1: NORMAL
MDC_IDC_SET_LEADCHNL_RV_SENSING_ANODE_LOCATION_1: NORMAL
MDC_IDC_SET_LEADCHNL_RV_SENSING_CATHODE_ELECTRODE_1: NORMAL
MDC_IDC_SET_LEADCHNL_RV_SENSING_CATHODE_LOCATION_1: NORMAL
MDC_IDC_SET_LEADCHNL_RV_SENSING_POLARITY: NORMAL
MDC_IDC_SET_LEADCHNL_RV_SENSING_SENSITIVITY: 0.9 MV
MDC_IDC_SET_ZONE_DETECTION_INTERVAL: 360 MS
MDC_IDC_SET_ZONE_STATUS: NORMAL
MDC_IDC_SET_ZONE_TYPE: NORMAL
MDC_IDC_SET_ZONE_VENDOR_TYPE: NORMAL
MDC_IDC_STAT_BRADY_DTM_END: NORMAL
MDC_IDC_STAT_BRADY_DTM_START: NORMAL
MDC_IDC_STAT_BRADY_RV_PERCENT_PACED: 42.16 %
MDC_IDC_STAT_EPISODE_RECENT_COUNT: 0
MDC_IDC_STAT_EPISODE_RECENT_COUNT: 0
MDC_IDC_STAT_EPISODE_RECENT_COUNT: 2
MDC_IDC_STAT_EPISODE_RECENT_COUNT_DTM_END: NORMAL
MDC_IDC_STAT_EPISODE_RECENT_COUNT_DTM_START: NORMAL
MDC_IDC_STAT_EPISODE_TOTAL_COUNT: 0
MDC_IDC_STAT_EPISODE_TOTAL_COUNT: 0
MDC_IDC_STAT_EPISODE_TOTAL_COUNT: 6
MDC_IDC_STAT_EPISODE_TOTAL_COUNT_DTM_END: NORMAL
MDC_IDC_STAT_EPISODE_TOTAL_COUNT_DTM_START: NORMAL
MDC_IDC_STAT_EPISODE_TYPE: NORMAL

## 2024-02-29 DIAGNOSIS — I25.10 CORONARY ARTERY DISEASE INVOLVING NATIVE CORONARY ARTERY OF NATIVE HEART WITHOUT ANGINA PECTORIS: ICD-10-CM

## 2024-02-29 RX ORDER — DABIGATRAN ETEXILATE 75 MG/1
75 CAPSULE ORAL 2 TIMES DAILY
Qty: 60 CAPSULE | Refills: 0 | Status: SHIPPED | OUTPATIENT
Start: 2024-02-29 | End: 2024-03-07

## 2024-03-05 ENCOUNTER — TELEPHONE (OUTPATIENT)
Dept: FAMILY MEDICINE | Facility: CLINIC | Age: 89
End: 2024-03-05
Payer: COMMERCIAL

## 2024-03-05 DIAGNOSIS — I25.10 CORONARY ARTERY DISEASE INVOLVING NATIVE CORONARY ARTERY OF NATIVE HEART WITHOUT ANGINA PECTORIS: ICD-10-CM

## 2024-03-05 NOTE — TELEPHONE ENCOUNTER
[facility] Prior Authorization Team Request    Medication:  dabigatran ANTICOAGULANT (PRADAXA) 75 MG capsule  Dosing: TAKE 1 CAPSULE BY MOUTH 2 TIMES DAILY. - Oral  NDC (required for Medicaid members):   Insurance Company: [employer]  BIN: [id number]  PCN: MD  Grp: ARIADNE  ID: [id number]  CoverMyMeds Key (if applicable): N/A  Additional documentation: N/A  Pharmacy Filling the Rx:  [id number] - [address], [place]  Filling Pharmacy Phone:  [phone]

## 2024-03-05 NOTE — TELEPHONE ENCOUNTER
PA Initiation    Medication: DABIGATRAN ETEXILATE MESYLATE 75 MG PO CAPS  Insurance Company: [email or web address] - Phone [phone] Fax [phone]  Pharmacy Filling the Rx: [id number] - [place] - [address]  Filling Pharmacy Phone: [phone]  Filling Pharmacy Fax:    Start Date: 3/5/2024  Retail Pharmacy Prior Authorization Team   Phone: [phone]

## 2024-03-05 NOTE — TELEPHONE ENCOUNTER
Patient is calling and wondering if provider would fill an alternative since it is not covered by insurance and costs $205. Starting PA process as well

## 2024-03-06 NOTE — TELEPHONE ENCOUNTER
PRIOR AUTHORIZATION DENIED    Medication: DABIGATRAN ETEXILATE MESYLATE 75 MG PO CAPS  Insurance Company: Daniel - Phone [phone] Fax [phone]  Denial Date: 3/5/2024  Denial Reason(s):     Appeal Information:     Patient Notified: Pharmacy will notify patient.

## 2024-03-06 NOTE — TELEPHONE ENCOUNTER
Pt called because he was wondering what was going on with his medication. Advised that PA was denied and  has the option to appeal the decision. Patient would prefer that a alternative medication be prescribed.

## 2024-03-07 ENCOUNTER — TELEPHONE (OUTPATIENT)
Dept: CARDIOLOGY | Facility: CLINIC | Age: 89
End: 2024-03-07
Payer: COMMERCIAL

## 2024-03-07 ENCOUNTER — TELEPHONE (OUTPATIENT)
Dept: FAMILY MEDICINE | Facility: CLINIC | Age: 89
End: 2024-03-07
Payer: COMMERCIAL

## 2024-03-07 DIAGNOSIS — I48.20 CHRONIC ATRIAL FIBRILLATION (H): Primary | ICD-10-CM

## 2024-03-07 RX ORDER — DABIGATRAN ETEXILATE 75 MG/1
75 CAPSULE ORAL 2 TIMES DAILY
Qty: 60 CAPSULE | Refills: 0 | OUTPATIENT
Start: 2024-03-07

## 2024-03-07 RX ORDER — DABIGATRAN ETEXILATE 75 MG/1
75 CAPSULE ORAL 2 TIMES DAILY
Qty: 60 CAPSULE | Refills: 0 | Status: SHIPPED | OUTPATIENT
Start: 2024-03-07 | End: 2024-04-10

## 2024-03-07 NOTE — TELEPHONE ENCOUNTER
PA Initiation    Medication: DABIGATRAN ETEXILATE MESYLATE 75 MG PO CAPS  Insurance Company: [employer] - Phone [phone] Fax [phone]  Pharmacy Filling the Rx: [id number] - [place] - [address]  Filling Pharmacy Phone: [phone]  Filling Pharmacy Fax:    Start Date: 3/7/2024  Retail Pharmacy Prior Authorization Team   Phone: [phone]

## 2024-03-07 NOTE — TELEPHONE ENCOUNTER
Team received request from Dr. Field to find out if Mr. Ingram can get coverage for Eliquis 2.5mg twice daily.    Writer has sent Rx to [facility] pharmacy, as this is the only way to find out what his cost would be at first.   There are patient assistance programs, and paular is aware of one for Medicare recipients that costs $85 per month.    Wait to hear from pharmacy what cost would be, then move forward as directed.  Probably will need to request patient assistance nurse Yudith Callahan for help.   Patient uses sign language for communication.    Kaylynn Olivares RN on 3/7/2024 at 12:13 PM

## 2024-03-07 NOTE — TELEPHONE ENCOUNTER
Updated diagnosis.   Pradaxa has been prescribed for his persistent afib which is a chronic diagnosis which requires anticoagulation.   Please submit PA again with above info.

## 2024-03-08 NOTE — TELEPHONE ENCOUNTER
Writer called back to [facility]'s pharmacy to find out if they determined the cost of the Eliquis, as requested by Dr. Field.    $11 for 3 month supply.      Writer has cancelled that Rx, because we did not actually want to prescribe it, just find out how much he would have to pay for it at [facility].   The real prescription that his doctor wanted him on, Pradaxa, that he has been taking for a while now, is still covered.  It was just too soon for them to get it refilled and there was confusion, so that is what prompted this whole episode.  Writer has discontinued the Eliquis and placed call to patient's son, Willi.  Verified with Willi that Mr. Ingram should just remain on the Pradaxa that he has always been taking and it is really covered.    Willi verbalized understanding.    Kaylynn Olivares RN on 3/8/2024 at 2:39 PM

## 2024-03-08 NOTE — TELEPHONE ENCOUNTER
Retail Pharmacy Prior Authorization Team   Phone: [phone]      Neha callback [phone], she has additional questions in regards to this new submission/appeal.    Per the patient's formulary, Brand Pradaxa is Tier 4 medication and covered.    When she looked into the previous claims, she sees that it was at the time too soon to fill as the patient got some tablets dispensed on 02/22/24 and their co-pay was a little over $11 for what was dispensed.    Per the previous records, patient has been getting Brand since it was prescribed and she wants to know why patient needs the generic as it is non-formulary and patient has not been previously receiving.

## 2024-04-01 DIAGNOSIS — I50.9 ACUTE ON CHRONIC CONGESTIVE HEART FAILURE, UNSPECIFIED HEART FAILURE TYPE (H): ICD-10-CM

## 2024-04-01 RX ORDER — HYDRALAZINE HYDROCHLORIDE 25 MG/1
25 TABLET, FILM COATED ORAL 3 TIMES DAILY
Qty: 270 TABLET | Refills: 0 | Status: SHIPPED | OUTPATIENT
Start: 2024-04-01 | End: 2024-05-20

## 2024-04-01 RX ORDER — ISOSORBIDE MONONITRATE 60 MG/1
60 TABLET, EXTENDED RELEASE ORAL DAILY
Qty: 90 TABLET | Refills: 0 | Status: SHIPPED | OUTPATIENT
Start: 2024-04-01 | End: 2024-05-20

## 2024-04-10 DIAGNOSIS — I48.20 CHRONIC ATRIAL FIBRILLATION (H): ICD-10-CM

## 2024-04-10 RX ORDER — DABIGATRAN ETEXILATE MESYLATE 75 MG/1
75 CAPSULE ORAL 2 TIMES DAILY
Qty: 60 CAPSULE | Refills: 11 | Status: SHIPPED | OUTPATIENT
Start: 2024-04-10

## 2024-04-18 DIAGNOSIS — I50.31 ACUTE DIASTOLIC HEART FAILURE (H): ICD-10-CM

## 2024-04-18 DIAGNOSIS — Z95.2 S/P TAVR (TRANSCATHETER AORTIC VALVE REPLACEMENT): ICD-10-CM

## 2024-04-18 DIAGNOSIS — E78.5 HYPERLIPIDEMIA WITH TARGET LDL LESS THAN 100: ICD-10-CM

## 2024-04-18 RX ORDER — TORSEMIDE 5 MG/1
TABLET ORAL
Qty: 270 TABLET | Refills: 3 | Status: SHIPPED | OUTPATIENT
Start: 2024-04-18 | End: 2024-05-20

## 2024-05-01 ENCOUNTER — OFFICE VISIT (OUTPATIENT)
Dept: FAMILY MEDICINE | Facility: CLINIC | Age: 89
End: 2024-05-01
Payer: COMMERCIAL

## 2024-05-01 VITALS
DIASTOLIC BLOOD PRESSURE: 62 MMHG | HEIGHT: 65 IN | SYSTOLIC BLOOD PRESSURE: 132 MMHG | HEART RATE: 56 BPM | OXYGEN SATURATION: 97 % | TEMPERATURE: 97.5 F | RESPIRATION RATE: 16 BRPM | BODY MASS INDEX: 24.56 KG/M2 | WEIGHT: 147.4 LBS

## 2024-05-01 DIAGNOSIS — I50.9 ACUTE ON CHRONIC CONGESTIVE HEART FAILURE, UNSPECIFIED HEART FAILURE TYPE (H): Primary | ICD-10-CM

## 2024-05-01 DIAGNOSIS — J45.40 MODERATE PERSISTENT ASTHMA WITHOUT COMPLICATION: ICD-10-CM

## 2024-05-01 DIAGNOSIS — I10 HYPERTENSION GOAL BP (BLOOD PRESSURE) < 140/90: ICD-10-CM

## 2024-05-01 DIAGNOSIS — L98.9 SKIN LESION: ICD-10-CM

## 2024-05-01 DIAGNOSIS — K22.11 ULCER OF ESOPHAGUS WITH BLEEDING: ICD-10-CM

## 2024-05-01 DIAGNOSIS — N18.32 STAGE 3B CHRONIC KIDNEY DISEASE (H): ICD-10-CM

## 2024-05-01 PROCEDURE — 82570 ASSAY OF URINE CREATININE: CPT | Performed by: FAMILY MEDICINE

## 2024-05-01 PROCEDURE — 99214 OFFICE O/P EST MOD 30 MIN: CPT | Performed by: FAMILY MEDICINE

## 2024-05-01 PROCEDURE — 82043 UR ALBUMIN QUANTITATIVE: CPT | Performed by: FAMILY MEDICINE

## 2024-05-01 PROCEDURE — T1013 SIGN LANG/ORAL INTERPRETER: HCPCS | Mod: U3

## 2024-05-01 RX ORDER — PANTOPRAZOLE SODIUM 40 MG/1
TABLET, DELAYED RELEASE ORAL
Qty: 180 TABLET | Refills: 1 | Status: SHIPPED | OUTPATIENT
Start: 2024-05-01 | End: 2024-09-03

## 2024-05-01 RX ORDER — MONTELUKAST SODIUM 10 MG/1
1 TABLET ORAL DAILY
Qty: 90 TABLET | Refills: 1 | Status: SHIPPED | OUTPATIENT
Start: 2024-05-01

## 2024-05-01 RX ORDER — METOPROLOL TARTRATE 25 MG/1
25 TABLET, FILM COATED ORAL 2 TIMES DAILY
Qty: 180 TABLET | Refills: 1 | Status: SHIPPED | OUTPATIENT
Start: 2024-05-01 | End: 2024-05-20

## 2024-05-01 RX ORDER — TRIAMCINOLONE ACETONIDE 1 MG/ML
LOTION TOPICAL 2 TIMES DAILY
Qty: 60 ML | Refills: 1 | Status: SHIPPED | OUTPATIENT
Start: 2024-05-01

## 2024-05-01 ASSESSMENT — PAIN SCALES - GENERAL: PAINLEVEL: NO PAIN (0)

## 2024-05-01 NOTE — LETTER
May 6, 2024      Shiraz PALOMARES [place]  [address] [facility] [address]        Dear ,    We are writing to inform you of your test results.    Urine test for leaking protein is within normal limit which is reassuring.     Resulted Orders   Albumin Random Urine Quantitative with Creat Ratio   Result Value Ref Range    Creatinine Urine mg/dL 28.2 mg/dL      Comment:      The reference ranges have not been established in urine creatinine. The results should be integrated into the clinical context for interpretation.    Albumin Urine mg/L <12.0 mg/L      Comment:      The reference ranges have not been established in urine albumin. The results should be integrated into the clinical context for interpretation.    Albumin Urine mg/g Cr        Comment:      Unable to calculate, urine albumin and/or urine creatinine is outside detectable limits.  Microalbuminuria is defined as an albumin:creatinine ratio of 17 to 299 for males and 25 to 299 for females. A ratio of albumin:creatinine of 300 or higher is indicative of overt proteinuria.  Due to biologic variability, positive results should be confirmed by a second, first-morning random or 24-hour timed urine specimen. If there is discrepancy, a third specimen is recommended. When 2 out of 3 results are in the microalbuminuria range, this is evidence for incipient nephropathy and warrants increased efforts at glucose control, blood pressure control, and institution of therapy with an angiotensin-converting-enzyme (ACE) inhibitor (if the patient can tolerate it).         If you have any questions or concerns, please call the clinic at the number listed above.       Sincerely,      Dany Rodriguez MD

## 2024-05-01 NOTE — PROGRESS NOTES
Assessment & Plan     Acute on chronic congestive heart failure, unspecified heart failure type (H)  Imdur dose increased. Having repeat bmp and seeing cardiology for follow up. Bp stable. Weight stable. On torsemide.   Since last discharge - on night time oxygen. Day time feeling fine. Spo2 at home over 90.     Stage 3b chronic kidney disease (H)  Pradaxa dose decreased during recent hospitalization. Avoid nephrotoxic agents.   - Albumin Random Urine Quantitative with Creat Ratio; Future  - Albumin Random Urine Quantitative with Creat Ratio    Hypertension goal BP (blood pressure) < 140/90  Imdur dose increased. Bp stable now.   - metoprolol tartrate (LOPRESSOR) 25 MG tablet; Take 1 tablet (25 mg) by mouth 2 times daily    Moderate persistent asthma without complication  Patient is tolerating current medication without any major side effects of concerns and current dose seems reasonable too.  Current medication regime is effective. Continue current treatment without any changes.   - montelukast (SINGULAIR) 10 MG tablet; Take 1 tablet (10 mg) by mouth daily    Ulcer of esophagus with bleeding  Currently asymptomatic. Lifelong PPI needs.   - pantoprazole (PROTONIX) 40 MG EC tablet; TAKE 1 TABLET BEFORE MEALS TWICE A DAY Strength: 40 mg    Skin lesion   Mostly dry skin and some actinic keratosis and many seborrheic keratosis on torso. Topical steroid lotion for few weeks. Understands moisturizer use.   - triamcinolone (KENALOG) 0.1 % external lotion; Apply topically 2 times daily                  Javon Weldon is a 89 year old, presenting for the following health issues:  RECHECK and Derm Problem (Back was been itchy and dry for around 2 months )      5/1/2024    12:50 PM   Additional Questions   Roomed by Elke LIAO     History of Present Illness       Reason for visit:  Follow up    He eats 2-3 servings of fruits and vegetables daily.He consumes 0 sweetened beverage(s) daily.He exercises with enough effort to  "increase his heart rate 10 to 19 minutes per day.  He exercises with enough effort to increase his heart rate 3 or less days per week.   He is taking medications regularly.     Was hospitalized in [place] with heart failure.   Came back and hospitalized again again [facility].   Having labs next week and then following up with cardiology.     Checking weight daily - around 140 and varies tiny bit.   Checking weight daily.     Pradaxa dose cut down to 75mg bid. Imdur dose increased from 30 to 60mg.     Breathing is fine. Using oxygen overnight but not during day time. 2L oxygen at night time. Ever since [facility] visit on oxygen. Checks oxygen levels at home. Mostly around 90s - 94 to 97. Never below 88.     Will wait until fall for covid shot.     Dry skin on chest and back and it is itchy.         Objective    /62 (BP Location: Right arm, Patient Position: Sitting, Cuff Size: Adult Regular)   Pulse 56   Temp 97.5  F (36.4  C) (Temporal)   Resp 16   Ht 1.657 m (5' 5.25\")   Wt 66.9 kg (147 lb 6.4 oz)   SpO2 97%   BMI 24.34 kg/m    Body mass index is 24.34 kg/m .  Physical Exam     Bilateral pitting edema.   Lungs clear  Heart RRR    Sign  present for the visit.           Signed Electronically by: Dany Rodriguez MD    "

## 2024-05-02 LAB
CREAT UR-MCNC: 28.2 MG/DL
MICROALBUMIN UR-MCNC: <12 MG/L
MICROALBUMIN/CREAT UR: NORMAL MG/G{CREAT}

## 2024-05-09 DIAGNOSIS — I50.31 ACUTE DIASTOLIC HEART FAILURE (H): ICD-10-CM

## 2024-05-09 RX ORDER — MAGNESIUM 64 MG (MAGNESIUM CHLORIDE) TABLET,DELAYED RELEASE
535 2 TIMES DAILY
Qty: 180 TABLET | Refills: 3 | Status: SHIPPED | OUTPATIENT
Start: 2024-05-09

## 2024-05-17 ENCOUNTER — LAB (OUTPATIENT)
Dept: LAB | Facility: CLINIC | Age: 89
End: 2024-05-17
Payer: COMMERCIAL

## 2024-05-17 DIAGNOSIS — I50.9 ACUTE ON CHRONIC CONGESTIVE HEART FAILURE, UNSPECIFIED HEART FAILURE TYPE (H): ICD-10-CM

## 2024-05-17 LAB
ANION GAP SERPL CALCULATED.3IONS-SCNC: 14 MMOL/L (ref 7–15)
BUN SERPL-MCNC: 68.4 MG/DL (ref 8–23)
CALCIUM SERPL-MCNC: 9.6 MG/DL (ref 8.8–10.2)
CHLORIDE SERPL-SCNC: 102 MMOL/L (ref 98–107)
CREAT SERPL-MCNC: 1.88 MG/DL (ref 0.67–1.17)
DEPRECATED HCO3 PLAS-SCNC: 25 MMOL/L (ref 22–29)
EGFRCR SERPLBLD CKD-EPI 2021: 34 ML/MIN/1.73M2
FASTING STATUS PATIENT QL REPORTED: YES
GLUCOSE SERPL-MCNC: 112 MG/DL (ref 70–99)
POTASSIUM SERPL-SCNC: 4.7 MMOL/L (ref 3.4–5.3)
SODIUM SERPL-SCNC: 141 MMOL/L (ref 135–145)

## 2024-05-17 PROCEDURE — 80048 BASIC METABOLIC PNL TOTAL CA: CPT | Performed by: INTERNAL MEDICINE

## 2024-05-17 PROCEDURE — 36415 COLL VENOUS BLD VENIPUNCTURE: CPT | Performed by: INTERNAL MEDICINE

## 2024-05-19 PROBLEM — J96.00 ACUTE RESPIRATORY FAILURE (H): Status: RESOLVED | Noted: 2020-04-23 | Resolved: 2024-05-19

## 2024-05-19 PROBLEM — J96.01 ACUTE RESPIRATORY FAILURE WITH HYPOXIA (H): Status: RESOLVED | Noted: 2021-03-15 | Resolved: 2024-05-19

## 2024-05-19 NOTE — PROGRESS NOTES
HEART CARE FOLLOW UP    Primary Care: Dany Rodriguez MD  Primary Cardiologist: Dr Field      Assessment/Recommendations     Congestive heart failure  Heart failure with midrange and now improved ejection fraction  Diastolic heart failure  Moderate pulmonary hypertension  Most recent echocardiogram in February of this year showed an ejection fraction of 55 to 60%.  The RV is mildly dilated with moderately reduced function.  He's doing very well. Weight stable. Renal function stable. He does not appear euvolemic. Functional capacity excellent for his age.   Continue hydralazine 25 mg 3 times daily  Continue Imdur 60 mg daily  Continue Toprol tartrate 25 mg twice a day  Continue torsemide 10 mg in the morning and 5 mg in the afternoon  Compliance with CPAP is essential   Status post TAVR, most recent echocardiogram in February of this year shows Medtronic valve in the aortic position and a normal gradient.  Atrial Fibrillation, most recent EKG preformed 2/2024 showed atrial fibrillation with slow ventricular response. The patient is currently on rate control therapy.   Continue metoprolol   Educated to watch closely for dizziness and exertional limitations  Pacing 44%  Continue pradaza for anticoagulation, no evidence of abnormal bleeding or bruising  Risk factors for AF include obesity, hypertension, CAD and JOSÉ ANTONIO. We reviewed this today and that working on these factors can decrease the burden of atrial fibrillation.   Hypertension, blood pressure above goal on current regimen. Reports medication compliance.   Continue regimen as is, Imdur recently increased   Does not have home BP cuff       Return to care in 3 months with me.      History of Present Illness/Subjective    Shiraz Ingram is a 89 year old male with past medical history significant for:  Chronic biventricular heart failure  Heart failure with midrange ejection fraction  Hypertension  Severe aortic stenosis status post TAVR  Status post 29  mm Medtronic implanted in July 2020, mean gradient around 9 mmHg  Atrial fibrillation, chronic  History of MAYA thrombus on anticoagulation  Coronary artery disease  Status post coronary artery bypass grafting with LIMA to the LAD, vein graft to the OM and the RCA  Complete Heart Block s/p PPPM  Complication of TAVR  Hyperlipidemia  CKD stage III  Obstructive sleep apnea  Small bowel obstruction, January 2023    The patient was last seen in in clinic in February of this year by my partner Dr. Field.  At that time it was noted he had been frequently admitted with episodes of heart failure.  Most recent hospitalization in February of this year.  At that visit noted his blood pressure was slightly above goal but he was back to his baseline after hospitalization.  He is not having shortness of breath with his activities of daily living.  No medication adjustments were made.  In early May the patient was seen by family medicine and Imdur was increased to 60 mg daily.  Some labs were drawn to evaluate chronic kidney disease and they also discussed his history of esophageal ulcer with bleeding and moderate persistent asthma.    Today the patient tells me he's doing mostly well. No change in how he's doing since he was last seen. He continues to work [employer]. He continues to live in the same house he's lived in for 50 years. His two sons live with him and help with household work. He can walk around the block without major limitations. If he doesn't use his cane he has dyspnea, but with his cane he tolerates activity without dyspnea. Rare dizziness, never has pre-syncope or syncope. No falls. No palpitations or racing heart. Leg swelling is at his baseline, mild. It improves by the morning and gets worse throughout the day. He sleeps with his CPAP and does not wake up short of breath. No blood in stool or urine. No fever or chills.        Data Review Today:     TTE February 8, 2024:  The visual ejection fraction  is 55-60%.  Left ventricular systolic function is normal.  The right ventricle is mildly dilated.  The right ventricular systolic function is moderately reduced.  29mm Medtronic Evolut Pro TAVR valve in the aortic position.,  The gradient is normal for this prosthetic aortic valve.  Right ventricular systolic pressure is elevated, consistent with moderate  pulmonary hypertension.  The rhythm was atrial fibrillation.  The study was technically difficult. The study was technically limited.    Echocardiogram June 20, 2022:  Left ventricular systolic function is mildly reduced.  The visual ejection fraction is 45-50%.  There is a bioprosthetic aortic valve. 29mm Medtronic Evolut Pro, implanted  7/2020  The mean AoV pressure gradient is 9mmHg.  Right ventricular systolic pressure is elevated, consistent with mild  pulmonary hypertension.  Compared to 6/21, mean gradients across bioprosthetic aortic valve is  unchanged. EF is likley unchanged. The study was technically difficult          I have reviewed and updated the patient's past medical history, allergy list and medication list.          Physical Examination   Vitals: There were no vitals taken for this visit.    BMI= There is no height or weight on file to calculate BMI.    Wt Readings from Last 3 Encounters:   05/01/24 66.9 kg (147 lb 6.4 oz)   02/20/24 63.7 kg (140 lb 6.4 oz)   02/15/24 63.5 kg (140 lb)       General :   Alert and oriented, in no acute distress.    HEENT:  Normocephalic and atraumatic. .    Neck: No JVP, carotid bruit or obvious thyromegaly.   Lungs:   Respirations unlabored. Clear bilaterally with no rales, rhonchi, or wheezes.     Cardiovascular:   Rhythm is irregular. S1 and S2 are normal. No significant murmur is present. Lower extremities demonstrate ankle with doughy edema.        Skin: Skin is warm, dry, and otherwise intact.   Neurologic: Gait not assessed. Mood and affect appropriate.           Medical History  Surgical History Family  History Social History   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    CKD (chronic kidney disease) stage 3, GFR 30-59 ml/min (H) 05/12/2011    Community acquired pneumonia 09/29/2020    Coronary atherosclerosis of unspecified type of vessel, native or graft     s/p CABG 2002    Esophageal ulcer w UGI bleed 05/24/2020    Had EGD adn caurtery 5/24/20, colonoscopy with diverticulosis then    Hyperlipidemia LDL goal < 100     Hypertension goal BP (blood pressure) < 140/90     Localized osteoarthrosis not specified whether primary or secondary, lower leg     left knee    Mild persistent asthma without complication 01/11/2016    Moderate persistent asthma     JOSÉ ANTONIO (obstructive sleep apnea)     Persistent atrial fibrillation (H)     Unspecified hearing loss     Past Surgical History:   Procedure Laterality Date    CARDIAC SURGERY      COLONOSCOPY N/A 5/26/2020    Procedure: COLONOSCOPY;  Surgeon: Ignacio Fournier MD;  Location:  GI    CV ANGIOGRAM CORONARY GRAFT N/A 4/24/2020    Procedure: Angiogram Coronary Graft;  Surgeon: Addison Willard MD;  Location: [facility] CARDIAC CATH LAB    CV CORONARY ANGIOGRAM N/A 4/24/2020    Procedure: Coronary Angiogram;  Surgeon: Addison Willard MD;  Location: [facility] CARDIAC CATH LAB    CV RIGHT HEART CATH MEASUREMENTS RECORDED N/A 4/24/2020    Procedure: Right Heart Cath;  Surgeon: Addison Willard MD;  Location:  HEART CARDIAC CATH LAB    CV TRANSCATHETER AORTIC VALVE REPLACEMENT N/A 7/14/2020    Procedure: Transcatheter Aortic Valve Replacement;  Surgeon: Soraida Monet MD;  Location:  HEART CARDIAC CATH LAB    EP PACEMAKER N/A 7/15/2020    Procedure: EP Pacemaker;  Surgeon: Tommie Sauer MD;  Location: [facility] CARDIAC CATH LAB    HC ESOPHAGOSCOPY,  DIAGNOSTIC      Dr. Dow, lower esophageal ring & mild gastritis    HERNIORRHAPHY INGUINAL Right 2016    Procedure: HERNIORRHAPHY INGUINAL;  Surgeon: Alexis Alexandra MD;  Location:  SD    HERNIORRHAPHY INGUINAL Left 2023    Procedure: LEFT GROIN EXPLORATION, SEGMENTAL SMALL BOWEL RESECTION, TRANSINGUINAL REPAIR OF INCARCERATED/STRANGULATED HERNIA;  Surgeon: Dwayne Russell MD;  Location:  OR    LAPAROSCOPIC CHOLECYSTECTOMY      for biliary sludge    REVERSE ARTHROPLASTY SHOULDER Right 2022    Procedure: RIGHT REVERSE SHOULDER ARTHROPLASTY, WITH OPEN REDUCTION INTERNAL FIXATION, WITH NO CUSTOM GUIDE;  Surgeon: Wiley Vargas MD;  Location:  OR    ZZC NONSPECIFIC PROCEDURE      Coronary artery bypass    ZZHC COLONOSCOPY THRU STOMA W BIOPSY/CAUTERY TUMOR/POLYP/LESION      Dr. Dow, Q 5 years    ZZHC COLONOSCOPY THRU STOMA W BIOPSY/CAUTERY TUMOR/POLYP/LESION      Dr. Dow, one adenomatous polyp    Family History   Problem Relation Age of Onset    C.A.D. Father     Cancer Mother         breast cancer,  of pneumonia    Cerebrovascular Disease Son     CABG Son     Family History Negative Child     Family History Negative Sister     Heart Disease Brother     Social History     Socioeconomic History    Marital status:      Spouse name: Kailey    Number of children: 3    Years of education: Not on file    Highest education level: Not on file   Occupational History    Occupation: [employer]     Employer: RETIRED   Tobacco Use    Smoking status: Never    Smokeless tobacco: Never    Tobacco comments:     smoked for a week in 20's   Vaping Use    Vaping status: Never Used   Substance and Sexual Activity    Alcohol use: Not Currently     Alcohol/week: 0.0 standard drinks of alcohol    Drug use: No    Sexual activity: Yes     Partners: Female   Other Topics Concern     Service No    Blood Transfusions No    Caffeine Concern Not Asked     Occupational Exposure Not Asked    Hobby Hazards Not Asked    Sleep Concern Not Asked    Stress Concern Not Asked    Weight Concern Not Asked    Special Diet Not Asked    Back Care Not Asked    Exercise Yes    Bike Helmet Not Asked    Seat Belt Yes    Self-Exams No    Parent/sibling w/ CABG, MI or angioplasty before 65F 55M? Yes   Social History Narrative    Balanced Diet - Yes    Osteoporosis Preventative measures-  Dairy servings per day: 2 to 3 servings daily    Regular Exercise -  Yes Describe walks 1.5 mile daily     Dental Exam up - YES - Date: 2 years ago    Eye Exam - YES - Date: 1 year ago    Self Testicular Exam -  No, handout given    Do you have any concerns about STD's -  No    Abuse: Current or Past (Physical, Sexual or Emotional)- No    Do you feel safe in your environment - Yes    Guns stored in the home - Yes, locked    Sunscreen used - No    Seatbelts used - Yes    Lipids - YES - Date: 3-09    Glucose -  YES - Date: 3-09    Colon Cancer Screening - Colonoscopy 3-08(date completed)    Hemoccults - UNKNOWN    PSA - YES - Date: 11-07    Digital Rectal Exam - UNKNOWN    Immunizations reviewed and up to date - Yes, td 1-2005, had shingles vaccine    5-28-09  [facility]                     Social Determinants of Health     Financial Resource Strain: Low Risk  (12/27/2023)    Financial Resource Strain     Within the past 12 months, have you or your family members you live with been unable to get utilities (heat, electricity) when it was really needed?: No   Food Insecurity: Low Risk  (12/27/2023)    Food Insecurity     Within the past 12 months, did you worry that your food would run out before you got money to buy more?: No     Within the past 12 months, did the food you bought just not last and you didn t have money to get more?: No   Transportation Needs: Low Risk  (12/27/2023)    Transportation Needs     Within the past 12 months, has lack of transportation kept you from medical appointments,  getting your medicines, non-medical meetings or appointments, work, or from getting things that you need?: No   Physical Activity: Insufficiently Active (10/28/2020)    Exercise Vital Sign     Days of Exercise per Week: 4 days     Minutes of Exercise per Session: 30 min   Stress: Not on file   Social Connections: Unknown (3/19/2021)    Social Connection and Isolation Panel [NHANES]     Frequency of Communication with Friends and Family: More than three times a week     Frequency of Social Gatherings with Friends and Family: Not on file     Attends Rastafarian Services: Not on file     Active Member of Clubs or Organizations: Not on file     Attends Club or Organization Meetings: Not on file     Marital Status:    Interpersonal Safety: Low Risk  (11/1/2023)    Interpersonal Safety     Do you feel physically and emotionally safe where you currently live?: Yes     Within the past 12 months, have you been hit, slapped, kicked or otherwise physically hurt by someone?: No     Within the past 12 months, have you been humiliated or emotionally abused in other ways by your partner or ex-partner?: No   Housing Stability: Low Risk  (12/27/2023)    Housing Stability     Do you have housing? : Yes     Are you worried about losing your housing?: No   Recent Concern: Housing Stability - High Risk (11/1/2023)    Housing Stability     Do you have housing? : No     Are you worried about losing your housing?: No          Medications  Allergies   Scheduled Meds:  Current Outpatient Medications   Medication Sig Dispense Refill    acetaminophen (TYLENOL) 500 MG tablet Take 1,000 mg by mouth every 8 hours as needed      albuterol (PROAIR HFA/PROVENTIL HFA/VENTOLIN HFA) 108 (90 Base) MCG/ACT inhaler INHALE 1 TO 2 PUFFS BY MOUTH EVERY 4 TO 6 HOURS AS NEEDED 18 g 2    aspirin (ASA) 81 MG EC tablet Take 81 mg by mouth 2 times daily 0700 1700      budesonide (PULMICORT) 0.5 MG/2ML neb solution Take 2 mLs (0.5 mg) by nebulization 2 times  daily 120 mL 3    calcium carbonate 600 mg-vitamin D 400 units (CALTRATE) 600-400 MG-UNIT per tablet Take 1 tablet by mouth daily      dabigatran ANTICOAGULANT (PRADAXA ANTICOAGULANT) 75 MG capsule TAKE 1 CAPSULE BY MOUTH 2 TIMES DAILY. 60 capsule 11    hydrALAZINE (APRESOLINE) 25 MG tablet Take 1 tablet (25 mg) by mouth 3 times daily 270 tablet 0    ipratropium - albuterol 0.5 mg/2.5 mg/3 mL (DUONEB) 0.5-2.5 (3) MG/3ML neb solution Inhale 1 vial (3 mLs) into the lungs 4 times daily 360 mL 11    isosorbide mononitrate (IMDUR) 60 MG 24 hr tablet Take 1 tablet (60 mg) by mouth daily Can take 2 pills of home supply of 30 mg tabs to equal 60 mg daily to finish existing home supply. 90 tablet 0    magnesium chloride (MAG64) 535 (64 Mg) MG TBEC CR tablet Take 1 tablet (535 mg) by mouth 2 times daily 180 tablet 3    metoprolol tartrate (LOPRESSOR) 25 MG tablet Take 1 tablet (25 mg) by mouth 2 times daily 180 tablet 1    montelukast (SINGULAIR) 10 MG tablet Take 1 tablet (10 mg) by mouth daily 90 tablet 1    multivitamin, therapeutic (THERA-VIT) TABS tablet Take 1 tablet by mouth 2 times daily      nitroGLYcerin (NITROSTAT) 0.4 MG sublingual tablet For chest pain place 1 tablet under the tongue every 5 minutes for 3 doses. If symptoms persist 5 minutes after 1st dose call 911. 12 tablet 0    pantoprazole (PROTONIX) 40 MG EC tablet TAKE 1 TABLET BEFORE MEALS TWICE A DAY Strength: 40 mg 180 tablet 1    simvastatin (ZOCOR) 40 MG tablet Take 1 tablet (40 mg) by mouth At Bedtime 90 tablet 4    torsemide (DEMADEX) 5 MG tablet Take 10 mg every morning and 5 mg every afternoon 270 tablet 3    triamcinolone (KENALOG) 0.1 % external lotion Apply topically 2 times daily 60 mL 1    No Known Allergies      Lab Results    Chemistry/lipid CBC Cardiac Enzymes/BNP/TSH/INR   Lab Results   Component Value Date    CHOL 126 11/01/2023    HDL 43 11/01/2023    TRIG 98 11/01/2023    BUN 68.4 (H) 05/17/2024     05/17/2024    CO2 25 05/17/2024     Lab Results   Component Value Date    WBC 3.5 (L) 02/10/2024    HGB 8.7 (L) 02/10/2024    HCT 28.9 (L) 02/10/2024    MCV 64 (L) 02/10/2024     02/10/2024    @RESUFAST(BMP,CBC,BNP,TSH,  INR)@        The longitudinal plan of care for the condition(s) below were addressed during this visit. Due to the added complexity in care, I will continue to support Shiraz in the subsequent management of this condition(s) and with the ongoing continuity of care of this condition(s):  congestive heart failure     This note has been dictated using voice recognition software. Any grammatical, typographical, or context distortions are unintentional and inherent to the software.    Melissa Cevallos PA-C, RD  General Cardiology

## 2024-05-20 ENCOUNTER — OFFICE VISIT (OUTPATIENT)
Dept: CARDIOLOGY | Facility: CLINIC | Age: 89
End: 2024-05-20
Attending: INTERNAL MEDICINE
Payer: COMMERCIAL

## 2024-05-20 VITALS
DIASTOLIC BLOOD PRESSURE: 62 MMHG | WEIGHT: 141 LBS | OXYGEN SATURATION: 97 % | HEIGHT: 65 IN | HEART RATE: 50 BPM | SYSTOLIC BLOOD PRESSURE: 154 MMHG | BODY MASS INDEX: 23.49 KG/M2

## 2024-05-20 DIAGNOSIS — I50.9 ACUTE ON CHRONIC CONGESTIVE HEART FAILURE, UNSPECIFIED HEART FAILURE TYPE (H): ICD-10-CM

## 2024-05-20 DIAGNOSIS — I48.21 PERMANENT ATRIAL FIBRILLATION (H): ICD-10-CM

## 2024-05-20 DIAGNOSIS — Z95.2 S/P TAVR (TRANSCATHETER AORTIC VALVE REPLACEMENT): ICD-10-CM

## 2024-05-20 DIAGNOSIS — I44.2 COMPLETE HEART BLOCK (H): ICD-10-CM

## 2024-05-20 DIAGNOSIS — I35.0 SEVERE AORTIC STENOSIS: ICD-10-CM

## 2024-05-20 DIAGNOSIS — E78.5 HYPERLIPIDEMIA WITH TARGET LDL LESS THAN 100: ICD-10-CM

## 2024-05-20 DIAGNOSIS — I50.31 ACUTE DIASTOLIC HEART FAILURE (H): ICD-10-CM

## 2024-05-20 DIAGNOSIS — I10 HYPERTENSION GOAL BP (BLOOD PRESSURE) < 140/90: ICD-10-CM

## 2024-05-20 PROCEDURE — T1013 SIGN LANG/ORAL INTERPRETER: HCPCS | Mod: U3

## 2024-05-20 PROCEDURE — G2211 COMPLEX E/M VISIT ADD ON: HCPCS | Performed by: PHYSICIAN ASSISTANT

## 2024-05-20 PROCEDURE — 99214 OFFICE O/P EST MOD 30 MIN: CPT | Performed by: PHYSICIAN ASSISTANT

## 2024-05-20 RX ORDER — ISOSORBIDE MONONITRATE 60 MG/1
60 TABLET, EXTENDED RELEASE ORAL DAILY
Qty: 90 TABLET | Refills: 3 | Status: SHIPPED | OUTPATIENT
Start: 2024-05-20

## 2024-05-20 RX ORDER — HYDRALAZINE HYDROCHLORIDE 25 MG/1
25 TABLET, FILM COATED ORAL 3 TIMES DAILY
Qty: 270 TABLET | Refills: 3 | Status: SHIPPED | OUTPATIENT
Start: 2024-05-20

## 2024-05-20 RX ORDER — TORSEMIDE 5 MG/1
TABLET ORAL
Qty: 270 TABLET | Refills: 3 | Status: SHIPPED | OUTPATIENT
Start: 2024-05-20

## 2024-05-20 RX ORDER — NITROGLYCERIN 0.4 MG/1
TABLET SUBLINGUAL
Qty: 12 TABLET | Refills: 0 | Status: SHIPPED | OUTPATIENT
Start: 2024-05-20

## 2024-05-20 RX ORDER — SIMVASTATIN 40 MG
40 TABLET ORAL AT BEDTIME
Qty: 90 TABLET | Refills: 3 | Status: SHIPPED | OUTPATIENT
Start: 2024-05-20

## 2024-05-20 RX ORDER — METOPROLOL TARTRATE 25 MG/1
25 TABLET, FILM COATED ORAL 2 TIMES DAILY
Qty: 180 TABLET | Refills: 3 | Status: SHIPPED | OUTPATIENT
Start: 2024-05-20

## 2024-05-20 NOTE — LETTER
5/20/2024    Dany Rodriguez MD, MD  [address]  [place] [address]    RE: Shiraz Ingram       Dear Colleague,     I had the pleasure of seeing Shiraz Ingram in the [facility].          HEART CARE FOLLOW UP    Primary Care: Dany Rodriguez MD  Primary Cardiologist: Dr Field      Assessment/Recommendations     Congestive heart failure  Heart failure with midrange and now improved ejection fraction  Diastolic heart failure  Moderate pulmonary hypertension  Most recent echocardiogram in February of this year showed an ejection fraction of 55 to 60%.  The RV is mildly dilated with moderately reduced function.  He's doing very well. Weight stable. Renal function stable. He does not appear euvolemic. Functional capacity excellent for his age.   Continue hydralazine 25 mg 3 times daily  Continue Imdur 60 mg daily  Continue Toprol tartrate 25 mg twice a day  Continue torsemide 10 mg in the morning and 5 mg in the afternoon  Compliance with CPAP is essential   Status post TAVR, most recent echocardiogram in February of this year shows Medtronic valve in the aortic position and a normal gradient.  Atrial Fibrillation, most recent EKG preformed 2/2024 showed atrial fibrillation with slow ventricular response. The patient is currently on rate control therapy.   Continue metoprolol   Educated to watch closely for dizziness and exertional limitations  Pacing 44%  Continue pradaza for anticoagulation, no evidence of abnormal bleeding or bruising  Risk factors for AF include obesity, hypertension, CAD and JOSÉ ANTONIO. We reviewed this today and that working on these factors can decrease the burden of atrial fibrillation.   Hypertension, blood pressure above goal on current regimen. Reports medication compliance.   Continue regimen as is, Imdur recently increased   Does not have home BP cuff       Return to care in 3 months with me.      History of Present Illness/Subjective    Shiraz Ingram  is a 89 year old male with past medical history significant for:  Chronic biventricular heart failure  Heart failure with midrange ejection fraction  Hypertension  Severe aortic stenosis status post TAVR  Status post 29 mm Medtronic implanted in July 2020, mean gradient around 9 mmHg  Atrial fibrillation, chronic  History of MAYA thrombus on anticoagulation  Coronary artery disease  Status post coronary artery bypass grafting with LIMA to the LAD, vein graft to the OM and the RCA  Complete Heart Block s/p PPPM  Complication of TAVR  Hyperlipidemia  CKD stage III  Obstructive sleep apnea  Small bowel obstruction, January 2023    The patient was last seen in in clinic in February of this year by my partner Dr. Field.  At that time it was noted he had been frequently admitted with episodes of heart failure.  Most recent hospitalization in February of this year.  At that visit noted his blood pressure was slightly above goal but he was back to his baseline after hospitalization.  He is not having shortness of breath with his activities of daily living.  No medication adjustments were made.  In early May the patient was seen by family medicine and Imdur was increased to 60 mg daily.  Some labs were drawn to evaluate chronic kidney disease and they also discussed his history of esophageal ulcer with bleeding and moderate persistent asthma.    Today the patient tells me he's doing mostly well. No change in how he's doing since he was last seen. He continues to work [employer]. He continues to live in the same house he's lived in for 50 years. His two sons live with him and help with household work. He can walk around the block without major limitations. If he doesn't use his cane he has dyspnea, but with his cane he tolerates activity without dyspnea. Rare dizziness, never has pre-syncope or syncope. No falls. No palpitations or racing heart. Leg swelling is at his baseline, mild. It improves by the morning and gets  worse throughout the day. He sleeps with his CPAP and does not wake up short of breath. No blood in stool or urine. No fever or chills.        Data Review Today:     TTE February 8, 2024:  The visual ejection fraction is 55-60%.  Left ventricular systolic function is normal.  The right ventricle is mildly dilated.  The right ventricular systolic function is moderately reduced.  29mm Medtronic Evolut Pro TAVR valve in the aortic position.,  The gradient is normal for this prosthetic aortic valve.  Right ventricular systolic pressure is elevated, consistent with moderate  pulmonary hypertension.  The rhythm was atrial fibrillation.  The study was technically difficult. The study was technically limited.    Echocardiogram June 20, 2022:  Left ventricular systolic function is mildly reduced.  The visual ejection fraction is 45-50%.  There is a bioprosthetic aortic valve. 29mm Medtronic Evolut Pro, implanted  7/2020  The mean AoV pressure gradient is 9mmHg.  Right ventricular systolic pressure is elevated, consistent with mild  pulmonary hypertension.  Compared to 6/21, mean gradients across bioprosthetic aortic valve is  unchanged. EF is likley unchanged. The study was technically difficult          I have reviewed and updated the patient's past medical history, allergy list and medication list.          Physical Examination   Vitals: There were no vitals taken for this visit.    BMI= There is no height or weight on file to calculate BMI.    Wt Readings from Last 3 Encounters:   05/01/24 66.9 kg (147 lb 6.4 oz)   02/20/24 63.7 kg (140 lb 6.4 oz)   02/15/24 63.5 kg (140 lb)       General :   Alert and oriented, in no acute distress.    HEENT:  Normocephalic and atraumatic. .    Neck: No JVP, carotid bruit or obvious thyromegaly.   Lungs:   Respirations unlabored. Clear bilaterally with no rales, rhonchi, or wheezes.     Cardiovascular:   Rhythm is irregular. S1 and S2 are normal. No significant murmur is present. Lower  extremities demonstrate ankle with doughy edema.        Skin: Skin is warm, dry, and otherwise intact.   Neurologic: Gait not assessed. Mood and affect appropriate.           Medical History  Surgical History Family History Social History   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    CKD (chronic kidney disease) stage 3, GFR 30-59 ml/min (H) 05/12/2011    Community acquired pneumonia 09/29/2020    Coronary atherosclerosis of unspecified type of vessel, native or graft     s/p CABG 2002    Esophageal ulcer w UGI bleed 05/24/2020    Had EGD adn caurtery 5/24/20, colonoscopy with diverticulosis then    Hyperlipidemia LDL goal < 100     Hypertension goal BP (blood pressure) < 140/90     Localized osteoarthrosis not specified whether primary or secondary, lower leg     left knee    Mild persistent asthma without complication 01/11/2016    Moderate persistent asthma     JOSÉ ANTONIO (obstructive sleep apnea)     Persistent atrial fibrillation (H)     Unspecified hearing loss     Past Surgical History:   Procedure Laterality Date    CARDIAC SURGERY      COLONOSCOPY N/A 5/26/2020    Procedure: COLONOSCOPY;  Surgeon: Ignacio Fournier MD;  Location:  GI    CV ANGIOGRAM CORONARY GRAFT N/A 4/24/2020    Procedure: Angiogram Coronary Graft;  Surgeon: Addison Willard MD;  Location:  HEART CARDIAC CATH LAB    CV CORONARY ANGIOGRAM N/A 4/24/2020    Procedure: Coronary Angiogram;  Surgeon: Addison Willard MD;  Location:  HEART CARDIAC CATH LAB    CV RIGHT HEART CATH MEASUREMENTS RECORDED N/A 4/24/2020    Procedure: Right Heart Cath;  Surgeon: Addison Willard MD;  Location:  HEART CARDIAC CATH LAB    CV TRANSCATHETER AORTIC VALVE REPLACEMENT N/A 7/14/2020    Procedure: Transcatheter Aortic Valve Replacement;  Surgeon: Tierney  Soraida Maravilla MD;  Location:  HEART CARDIAC CATH LAB    EP PACEMAKER N/A 7/15/2020    Procedure: EP Pacemaker;  Surgeon: Tommie Sauer MD;  Location:  HEART CARDIAC CATH LAB    HC ESOPHAGOSCOPY, DIAGNOSTIC      Dr. Dow, lower esophageal ring & mild gastritis    HERNIORRHAPHY INGUINAL Right 2016    Procedure: HERNIORRHAPHY INGUINAL;  Surgeon: Alexis Alexandra MD;  Location:  SD    HERNIORRHAPHY INGUINAL Left 2023    Procedure: LEFT GROIN EXPLORATION, SEGMENTAL SMALL BOWEL RESECTION, TRANSINGUINAL REPAIR OF INCARCERATED/STRANGULATED HERNIA;  Surgeon: Dwayne Russell MD;  Location:  OR    LAPAROSCOPIC CHOLECYSTECTOMY      for biliary sludge    REVERSE ARTHROPLASTY SHOULDER Right 2022    Procedure: RIGHT REVERSE SHOULDER ARTHROPLASTY, WITH OPEN REDUCTION INTERNAL FIXATION, WITH NO CUSTOM GUIDE;  Surgeon: Wiley Vargas MD;  Location:  OR    ZZC NONSPECIFIC PROCEDURE      Coronary artery bypass    ZZHC COLONOSCOPY THRU STOMA W BIOPSY/CAUTERY TUMOR/POLYP/LESION      Dr. Dow, Q 5 years    ZZHC COLONOSCOPY THRU STOMA W BIOPSY/CAUTERY TUMOR/POLYP/LESION      Dr. Dow, one adenomatous polyp    Family History   Problem Relation Age of Onset    C.A.D. Father     Cancer Mother         breast cancer,  of pneumonia    Cerebrovascular Disease Son     CABG Son     Family History Negative Child     Family History Negative Sister     Heart Disease Brother     Social History     Socioeconomic History    Marital status:      Spouse name: Kailey    Number of children: 3    Years of education: Not on file    Highest education level: Not on file   Occupational History    Occupation: [employer]     Employer: RETIRED   Tobacco Use    Smoking status: Never    Smokeless tobacco: Never    Tobacco comments:     smoked for a week in 20's   Vaping Use    Vaping status: Never Used   Substance and Sexual Activity    Alcohol use: Not Currently      Alcohol/week: 0.0 standard drinks of alcohol    Drug use: No    Sexual activity: Yes     Partners: Female   Other Topics Concern     Service No    Blood Transfusions No    Caffeine Concern Not Asked    Occupational Exposure Not Asked    Hobby Hazards Not Asked    Sleep Concern Not Asked    Stress Concern Not Asked    Weight Concern Not Asked    Special Diet Not Asked    Back Care Not Asked    Exercise Yes    Bike Helmet Not Asked    Seat Belt Yes    Self-Exams No    Parent/sibling w/ CABG, MI or angioplasty before 65F 55M? Yes   Social History Narrative    Balanced Diet - Yes    Osteoporosis Preventative measures-  Dairy servings per day: 2 to 3 servings daily    Regular Exercise -  Yes Describe walks 1.5 mile daily     Dental Exam up - YES - Date: 2 years ago    Eye Exam - YES - Date: 1 year ago    Self Testicular Exam -  No, handout given    Do you have any concerns about STD's -  No    Abuse: Current or Past (Physical, Sexual or Emotional)- No    Do you feel safe in your environment - Yes    Guns stored in the home - Yes, locked    Sunscreen used - No    Seatbelts used - Yes    Lipids - YES - Date: 3-09    Glucose -  YES - Date: 3-09    Colon Cancer Screening - Colonoscopy 3-08(date completed)    Hemoccults - UNKNOWN    PSA - YES - Date: 11-07    Digital Rectal Exam - UNKNOWN    Immunizations reviewed and up to date - Yes, td 1-2005, had shingles vaccine    5-28-09  JANEY Patel [facility]                     Social Determinants of Health     Financial Resource Strain: Low Risk  (12/27/2023)    Financial Resource Strain     Within the past 12 months, have you or your family members you live with been unable to get utilities (heat, electricity) when it was really needed?: No   Food Insecurity: Low Risk  (12/27/2023)    Food Insecurity     Within the past 12 months, did you worry that your food would run out before you got money to buy more?: No     Within the past 12 months, did the food you bought just not last and  you didn t have money to get more?: No   Transportation Needs: Low Risk  (12/27/2023)    Transportation Needs     Within the past 12 months, has lack of transportation kept you from medical appointments, getting your medicines, non-medical meetings or appointments, work, or from getting things that you need?: No   Physical Activity: Insufficiently Active (10/28/2020)    Exercise Vital Sign     Days of Exercise per Week: 4 days     Minutes of Exercise per Session: 30 min   Stress: Not on file   Social Connections: Unknown (3/19/2021)    Social Connection and Isolation Panel [NHANES]     Frequency of Communication with Friends and Family: More than three times a week     Frequency of Social Gatherings with Friends and Family: Not on file     Attends Jehovah's witness Services: Not on file     Active Member of Clubs or Organizations: Not on file     Attends Club or Organization Meetings: Not on file     Marital Status:    Interpersonal Safety: Low Risk  (11/1/2023)    Interpersonal Safety     Do you feel physically and emotionally safe where you currently live?: Yes     Within the past 12 months, have you been hit, slapped, kicked or otherwise physically hurt by someone?: No     Within the past 12 months, have you been humiliated or emotionally abused in other ways by your partner or ex-partner?: No   Housing Stability: Low Risk  (12/27/2023)    Housing Stability     Do you have housing? : Yes     Are you worried about losing your housing?: No   Recent Concern: Housing Stability - High Risk (11/1/2023)    Housing Stability     Do you have housing? : No     Are you worried about losing your housing?: No          Medications  Allergies   Scheduled Meds:  Current Outpatient Medications   Medication Sig Dispense Refill    acetaminophen (TYLENOL) 500 MG tablet Take 1,000 mg by mouth every 8 hours as needed      albuterol (PROAIR HFA/PROVENTIL HFA/VENTOLIN HFA) 108 (90 Base) MCG/ACT inhaler INHALE 1 TO 2 PUFFS BY MOUTH EVERY  4 TO 6 HOURS AS NEEDED 18 g 2    aspirin (ASA) 81 MG EC tablet Take 81 mg by mouth 2 times daily 0700 1700      budesonide (PULMICORT) 0.5 MG/2ML neb solution Take 2 mLs (0.5 mg) by nebulization 2 times daily 120 mL 3    calcium carbonate 600 mg-vitamin D 400 units (CALTRATE) 600-400 MG-UNIT per tablet Take 1 tablet by mouth daily      dabigatran ANTICOAGULANT (PRADAXA ANTICOAGULANT) 75 MG capsule TAKE 1 CAPSULE BY MOUTH 2 TIMES DAILY. 60 capsule 11    hydrALAZINE (APRESOLINE) 25 MG tablet Take 1 tablet (25 mg) by mouth 3 times daily 270 tablet 0    ipratropium - albuterol 0.5 mg/2.5 mg/3 mL (DUONEB) 0.5-2.5 (3) MG/3ML neb solution Inhale 1 vial (3 mLs) into the lungs 4 times daily 360 mL 11    isosorbide mononitrate (IMDUR) 60 MG 24 hr tablet Take 1 tablet (60 mg) by mouth daily Can take 2 pills of home supply of 30 mg tabs to equal 60 mg daily to finish existing home supply. 90 tablet 0    magnesium chloride (MAG64) 535 (64 Mg) MG TBEC CR tablet Take 1 tablet (535 mg) by mouth 2 times daily 180 tablet 3    metoprolol tartrate (LOPRESSOR) 25 MG tablet Take 1 tablet (25 mg) by mouth 2 times daily 180 tablet 1    montelukast (SINGULAIR) 10 MG tablet Take 1 tablet (10 mg) by mouth daily 90 tablet 1    multivitamin, therapeutic (THERA-VIT) TABS tablet Take 1 tablet by mouth 2 times daily      nitroGLYcerin (NITROSTAT) 0.4 MG sublingual tablet For chest pain place 1 tablet under the tongue every 5 minutes for 3 doses. If symptoms persist 5 minutes after 1st dose call 911. 12 tablet 0    pantoprazole (PROTONIX) 40 MG EC tablet TAKE 1 TABLET BEFORE MEALS TWICE A DAY Strength: 40 mg 180 tablet 1    simvastatin (ZOCOR) 40 MG tablet Take 1 tablet (40 mg) by mouth At Bedtime 90 tablet 4    torsemide (DEMADEX) 5 MG tablet Take 10 mg every morning and 5 mg every afternoon 270 tablet 3    triamcinolone (KENALOG) 0.1 % external lotion Apply topically 2 times daily 60 mL 1    No Known Allergies      Lab Results    Chemistry/lipid  CBC Cardiac Enzymes/BNP/TSH/INR   Lab Results   Component Value Date    CHOL 126 11/01/2023    HDL 43 11/01/2023    TRIG 98 11/01/2023    BUN 68.4 (H) 05/17/2024     05/17/2024    CO2 25 05/17/2024    Lab Results   Component Value Date    WBC 3.5 (L) 02/10/2024    HGB 8.7 (L) 02/10/2024    HCT 28.9 (L) 02/10/2024    MCV 64 (L) 02/10/2024     02/10/2024    @RESUFAST(BMP,CBC,BNP,TSH,  INR)@        The longitudinal plan of care for the condition(s) below were addressed during this visit. Due to the added complexity in care, I will continue to support Shiraz in the subsequent management of this condition(s) and with the ongoing continuity of care of this condition(s):  congestive heart failure     This note has been dictated using voice recognition software. Any grammatical, typographical, or context distortions are unintentional and inherent to the software.    Melissa Cevallos PA-C, RD  General Cardiology           Thank you for allowing me to participate in the care of your patient.      Sincerely,     Melissa Cevallos PA-C     [facility]  cc:   Jaxon Field MD  [address]  [place] [address]

## 2024-05-20 NOTE — PATIENT INSTRUCTIONS
It was great to see you today! Your Cardiology Team includes myself and Dr. Field.     Monitor for activity intolerance or worsening dizziness.     Return to see me in 3 months.    For scheduling questions, our 24 hours scheduling line is available at [phone].    To reach our nursing team, please call [phone]. Our nursing team is available 8-4:30 Monday-Friday. If you have a medical emergency, please call [phone].

## 2024-06-04 ENCOUNTER — ANCILLARY PROCEDURE (OUTPATIENT)
Dept: CARDIOLOGY | Facility: CLINIC | Age: 89
End: 2024-06-04
Attending: INTERNAL MEDICINE
Payer: COMMERCIAL

## 2024-06-04 DIAGNOSIS — I49.5 SSS (SICK SINUS SYNDROME) (H): ICD-10-CM

## 2024-06-04 DIAGNOSIS — Z95.0 CARDIAC PACEMAKER IN SITU: Primary | ICD-10-CM

## 2024-06-04 DIAGNOSIS — Z95.0 CARDIAC PACEMAKER IN SITU: ICD-10-CM

## 2024-06-04 PROCEDURE — 93296 REM INTERROG EVL PM/IDS: CPT | Performed by: INTERNAL MEDICINE

## 2024-06-04 PROCEDURE — 93294 REM INTERROG EVL PM/LDLS PM: CPT | Performed by: INTERNAL MEDICINE

## 2024-06-05 LAB
MDC_IDC_LEAD_CONNECTION_STATUS: NORMAL
MDC_IDC_LEAD_IMPLANT_DT: NORMAL
MDC_IDC_LEAD_LOCATION: NORMAL
MDC_IDC_LEAD_LOCATION_DETAIL_1: NORMAL
MDC_IDC_LEAD_MFG: NORMAL
MDC_IDC_LEAD_MODEL: NORMAL
MDC_IDC_LEAD_POLARITY_TYPE: NORMAL
MDC_IDC_LEAD_SERIAL: NORMAL
MDC_IDC_MSMT_BATTERY_DTM: NORMAL
MDC_IDC_MSMT_BATTERY_REMAINING_LONGEVITY: 135 MO
MDC_IDC_MSMT_BATTERY_RRT_TRIGGER: 2.62
MDC_IDC_MSMT_BATTERY_STATUS: NORMAL
MDC_IDC_MSMT_BATTERY_VOLTAGE: 3.03 V
MDC_IDC_MSMT_LEADCHNL_RV_IMPEDANCE_VALUE: 304 OHM
MDC_IDC_MSMT_LEADCHNL_RV_IMPEDANCE_VALUE: 380 OHM
MDC_IDC_MSMT_LEADCHNL_RV_PACING_THRESHOLD_AMPLITUDE: 0.62 V
MDC_IDC_MSMT_LEADCHNL_RV_PACING_THRESHOLD_PULSEWIDTH: 0.4 MS
MDC_IDC_MSMT_LEADCHNL_RV_SENSING_INTR_AMPL: 8.75 MV
MDC_IDC_MSMT_LEADCHNL_RV_SENSING_INTR_AMPL: 8.75 MV
MDC_IDC_PG_IMPLANT_DTM: NORMAL
MDC_IDC_PG_MFG: NORMAL
MDC_IDC_PG_MODEL: NORMAL
MDC_IDC_PG_SERIAL: NORMAL
MDC_IDC_PG_TYPE: NORMAL
MDC_IDC_SESS_CLINIC_NAME: NORMAL
MDC_IDC_SESS_DTM: NORMAL
MDC_IDC_SESS_TYPE: NORMAL
MDC_IDC_SET_BRADY_HYSTRATE: NORMAL
MDC_IDC_SET_BRADY_LOWRATE: 50 {BEATS}/MIN
MDC_IDC_SET_BRADY_MODE: NORMAL
MDC_IDC_SET_LEADCHNL_RV_PACING_AMPLITUDE: 2 V
MDC_IDC_SET_LEADCHNL_RV_PACING_ANODE_ELECTRODE_1: NORMAL
MDC_IDC_SET_LEADCHNL_RV_PACING_ANODE_LOCATION_1: NORMAL
MDC_IDC_SET_LEADCHNL_RV_PACING_CAPTURE_MODE: NORMAL
MDC_IDC_SET_LEADCHNL_RV_PACING_CATHODE_ELECTRODE_1: NORMAL
MDC_IDC_SET_LEADCHNL_RV_PACING_CATHODE_LOCATION_1: NORMAL
MDC_IDC_SET_LEADCHNL_RV_PACING_POLARITY: NORMAL
MDC_IDC_SET_LEADCHNL_RV_PACING_PULSEWIDTH: 0.4 MS
MDC_IDC_SET_LEADCHNL_RV_SENSING_ANODE_ELECTRODE_1: NORMAL
MDC_IDC_SET_LEADCHNL_RV_SENSING_ANODE_LOCATION_1: NORMAL
MDC_IDC_SET_LEADCHNL_RV_SENSING_CATHODE_ELECTRODE_1: NORMAL
MDC_IDC_SET_LEADCHNL_RV_SENSING_CATHODE_LOCATION_1: NORMAL
MDC_IDC_SET_LEADCHNL_RV_SENSING_POLARITY: NORMAL
MDC_IDC_SET_LEADCHNL_RV_SENSING_SENSITIVITY: 0.9 MV
MDC_IDC_SET_ZONE_DETECTION_INTERVAL: 360 MS
MDC_IDC_SET_ZONE_STATUS: NORMAL
MDC_IDC_SET_ZONE_TYPE: NORMAL
MDC_IDC_SET_ZONE_VENDOR_TYPE: NORMAL
MDC_IDC_STAT_BRADY_DTM_END: NORMAL
MDC_IDC_STAT_BRADY_DTM_START: NORMAL
MDC_IDC_STAT_BRADY_RV_PERCENT_PACED: 61.55 %
MDC_IDC_STAT_EPISODE_RECENT_COUNT: 0
MDC_IDC_STAT_EPISODE_RECENT_COUNT_DTM_END: NORMAL
MDC_IDC_STAT_EPISODE_RECENT_COUNT_DTM_START: NORMAL
MDC_IDC_STAT_EPISODE_TOTAL_COUNT: 0
MDC_IDC_STAT_EPISODE_TOTAL_COUNT: 0
MDC_IDC_STAT_EPISODE_TOTAL_COUNT: 6
MDC_IDC_STAT_EPISODE_TOTAL_COUNT_DTM_END: NORMAL
MDC_IDC_STAT_EPISODE_TOTAL_COUNT_DTM_START: NORMAL
MDC_IDC_STAT_EPISODE_TYPE: NORMAL

## 2024-07-10 ENCOUNTER — TELEPHONE (OUTPATIENT)
Dept: FAMILY MEDICINE | Facility: CLINIC | Age: 89
End: 2024-07-10
Payer: COMMERCIAL

## 2024-07-10 NOTE — TELEPHONE ENCOUNTER
Reason for Call:  Appointment Request    Patient requesting this type of appt: Pre-op    Requested provider: Dany Rodriguez    Reason patient unable to be scheduled: Not within requested timeframe    When does patient want to be seen/preferred time: 3-7 days    Comments: patient is having eye surgery on 7/16 and needs a pre op requesting work in with anyone before then     Okay to leave a detailed message?: Yes at Home number on file [phone] (home)    Call taken on 7/10/2024 at 11:29 AM by Joan Nielsen

## 2024-07-11 ENCOUNTER — OFFICE VISIT (OUTPATIENT)
Dept: FAMILY MEDICINE | Facility: CLINIC | Age: 89
End: 2024-07-11
Payer: COMMERCIAL

## 2024-07-11 VITALS
WEIGHT: 152 LBS | HEIGHT: 67 IN | DIASTOLIC BLOOD PRESSURE: 70 MMHG | BODY MASS INDEX: 23.86 KG/M2 | SYSTOLIC BLOOD PRESSURE: 153 MMHG | HEART RATE: 50 BPM | RESPIRATION RATE: 15 BRPM | OXYGEN SATURATION: 98 % | TEMPERATURE: 97.2 F

## 2024-07-11 DIAGNOSIS — H25.9 SENILE CATARACT OF LEFT EYE, UNSPECIFIED AGE-RELATED CATARACT TYPE: ICD-10-CM

## 2024-07-11 DIAGNOSIS — Z01.818 PREOP GENERAL PHYSICAL EXAM: Primary | ICD-10-CM

## 2024-07-11 LAB
CREAT SERPL-MCNC: 2 MG/DL (ref 0.67–1.17)
EGFRCR SERPLBLD CKD-EPI 2021: 31 ML/MIN/1.73M2
ERYTHROCYTE [DISTWIDTH] IN BLOOD BY AUTOMATED COUNT: 17.5 % (ref 10–15)
HCT VFR BLD AUTO: 35.3 % (ref 40–53)
HGB BLD-MCNC: 10.9 G/DL (ref 13.3–17.7)
MCH RBC QN AUTO: 20.4 PG (ref 26.5–33)
MCHC RBC AUTO-ENTMCNC: 30.9 G/DL (ref 31.5–36.5)
MCV RBC AUTO: 66 FL (ref 78–100)
PLATELET # BLD AUTO: 137 10E3/UL (ref 150–450)
POTASSIUM SERPL-SCNC: 5.2 MMOL/L (ref 3.4–5.3)
RBC # BLD AUTO: 5.35 10E6/UL (ref 4.4–5.9)
SODIUM SERPL-SCNC: 138 MMOL/L (ref 135–145)
WBC # BLD AUTO: 4.3 10E3/UL (ref 4–11)

## 2024-07-11 PROCEDURE — 36415 COLL VENOUS BLD VENIPUNCTURE: CPT | Performed by: PHYSICIAN ASSISTANT

## 2024-07-11 PROCEDURE — 82565 ASSAY OF CREATININE: CPT | Performed by: PHYSICIAN ASSISTANT

## 2024-07-11 PROCEDURE — 99214 OFFICE O/P EST MOD 30 MIN: CPT | Performed by: PHYSICIAN ASSISTANT

## 2024-07-11 PROCEDURE — 84132 ASSAY OF SERUM POTASSIUM: CPT | Performed by: PHYSICIAN ASSISTANT

## 2024-07-11 PROCEDURE — 84295 ASSAY OF SERUM SODIUM: CPT | Performed by: PHYSICIAN ASSISTANT

## 2024-07-11 PROCEDURE — 85027 COMPLETE CBC AUTOMATED: CPT | Performed by: PHYSICIAN ASSISTANT

## 2024-07-11 ASSESSMENT — ASTHMA QUESTIONNAIRES
ACT_TOTALSCORE: 25
ACT_TOTALSCORE: 25
QUESTION_4 LAST FOUR WEEKS HOW OFTEN HAVE YOU USED YOUR RESCUE INHALER OR NEBULIZER MEDICATION (SUCH AS ALBUTEROL): NOT AT ALL
QUESTION_5 LAST FOUR WEEKS HOW WOULD YOU RATE YOUR ASTHMA CONTROL: COMPLETELY CONTROLLED
QUESTION_2 LAST FOUR WEEKS HOW OFTEN HAVE YOU HAD SHORTNESS OF BREATH: NOT AT ALL
QUESTION_1 LAST FOUR WEEKS HOW MUCH OF THE TIME DID YOUR ASTHMA KEEP YOU FROM GETTING AS MUCH DONE AT WORK, SCHOOL OR AT HOME: NONE OF THE TIME
QUESTION_3 LAST FOUR WEEKS HOW OFTEN DID YOUR ASTHMA SYMPTOMS (WHEEZING, COUGHING, SHORTNESS OF BREATH, CHEST TIGHTNESS OR PAIN) WAKE YOU UP AT NIGHT OR EARLIER THAN USUAL IN THE MORNING: NOT AT ALL

## 2024-07-11 NOTE — PROGRESS NOTES
Preoperative Evaluation  [address]  [address]  [place] [address]  Phone: [phone]  Fax: [phone]  Primary Provider: Dany Rodriguez MD, MD  Pre-op Performing Provider: Alessandra Masterson PA-C  Jul 11, 2024 7/11/2024   Surgical Information   What procedure is being done? Cataracts   Facility or Hospital where procedure/surgery will be performed: [facility]   Who is doing the procedure / surgery? Unknown   Date of surgery / procedure: 8/6/24   Time of surgery / procedure: TBD   Where do you plan to recover after surgery? at home with family      Fax number for surgical facility: [phone]    Assessment & Plan     The proposed surgical procedure is considered LOW risk.    Preop general physical exam  Senile cataract of left eye, unspecified age-related cataract type  - CBC with platelets  - Sodium  - Potassium  - Creatinine  - CBC with platelets  - Sodium  - Potassium  - Creatinine    Risks and Recommendations  The patient has the following additional risks and recommendations for perioperative complications:   - No identified additional risk factors other than previously addressed    Preoperative Medication Instructions  Antiplatelet or Anticoagulation Medication Instructions   - dabigatran (Pradaxa): Bleeding risk is low for this procedure AND CrCl (</=) 80 mL/min. DO NOT TAKE 2 DAYS BEFORE AND DAY OF SURGERY.  (Do not take 8/4, 8/5, 8/6)    Additional Medication Instructions  Take all scheduled medications on the day of surgery    Recommendation  Approval given to proceed with proposed procedure, without further diagnostic evaluation.    Javon Weldon is a 89 year old, presenting for the following:  Pre-Op Exam (Pre Op )          7/11/2024    10:07 AM   Additional Questions   Roomed by Alessandra Hanna         7/11/2024   Forms   Any forms needing to be completed Yes        HPI related to upcoming procedure: left cataract         7/11/2024   Pre-Op Questionnaire   Have you ever had a heart attack or stroke? No   Have you ever had surgery on your heart or blood vessels, such as a stent placement, a coronary artery bypass, or surgery on an artery in your head, neck, heart, or legs? (!) YES    Do you have chest pain with activity? No   Do you have a history of heart failure? No   Do you currently have a cold, bronchitis or symptoms of other infection? No   Do you have a cough, shortness of breath, or wheezing? (!) YES - SOB, stable   Do you or anyone in your family have previous history of blood clots? No   Do you or does anyone in your family have a serious bleeding problem such as prolonged bleeding following surgeries or cuts? No   Have you ever had problems with anemia or been told to take iron pills? (!) YES - chronic pancytopenia    Have you had any abnormal blood loss such as black, tarry or bloody stools? No   Have you ever had a blood transfusion? No   Are you willing to have a blood transfusion if it is medically needed before, during, or after your surgery? Yes   Have you or any of your relatives ever had problems with anesthesia? No   Do you have sleep apnea, excessive snoring or daytime drowsiness? (!) YES   Do you have a CPAP machine? Yes   Do you have any artifical heart valves or other implanted medical devices like a pacemaker, defibrillator, or continuous glucose monitor? (!) YES   What type of device do you have? Pacemaker   Name of the clinic that manages your device [facility]   Do you have artificial joints? No   Are you allergic to latex? No        Health Care Directive  Patient does not have a Health Care Directive or Living Will: Advance Directive received and scanned. Click on Code in the patient header to view.    Preoperative Review of    reviewed - no record of controlled substances prescribed.      Patient Active Problem List    Diagnosis Date Noted    Acute exacerbation  of CHF (congestive heart failure) (H) 02/08/2024     Priority: Medium    Acute on chronic diastolic congestive heart failure (H) 11/01/2023     Priority: Medium    Pancytopenia (H) 01/27/2023     Priority: Medium    H/O inguinal hernia repair 01/27/2023     Priority: Medium    Small bowel obstruction (H) 01/13/2023     Priority: Medium    Incarcerated hernia 01/13/2023     Priority: Medium    Postoperative state 02/25/2022     Priority: Medium    Bilateral deafness 02/23/2022     Priority: Medium     Using       Thrombocytopenia, unspecified (H24) 01/26/2022     Priority: Medium    Pain in joint involving ankle and foot, right 01/12/2022     Priority: Medium    Chronic combined systolic and diastolic heart failure (H) 12/08/2021     Priority: Medium    Hx of Esophageal ulcer -- Cauterized 5/24/20 03/16/2021     Priority: Medium    Anemia of chronic disease (baseline Hgb 9.0-10.5) 03/16/2021     Priority: Medium    Acute congestive heart failure, unspecified heart failure type (H) 10/27/2020     Priority: Medium    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 07/14/2020     Priority: Medium    Aortic stenosis, severe 07/14/2020     Priority: Medium    Persistent Afib -- on Pradaxa  07/13/2020     Priority: Medium    Thrombus of left atrial appendage 07/13/2020     Priority: Medium    Pneumonia 07/03/2020     Priority: Medium    GI bleed 05/24/2020     Priority: Medium    Hyperkalemia 05/11/2020     Priority: Medium    Acute on chronic congestive heart failure, unspecified heart failure type (H) 04/23/2020     Priority: Medium     Added automatically from request for surgery [id number]      Severe aortic stenosis -- S/P TAVR 12/2020 04/23/2020     Priority: Medium     Added automatically from request for surgery [id number]      Coronary artery disease involving native coronary artery of native heart without angina pectoris 04/23/2020     Priority: Medium     Added automatically from request for surgery  [id number]      CAD, S/P CABG in 2002 04/23/2020     Priority: Medium     Added automatically from request for surgery [id number]      Health Care Home 02/25/2013     Priority: Medium     EMERGENCY CARE PLAN  Presenting Problem Signs and Symptoms Treatment Plan    Questions or conerns during clinic hours    I will call the clinic directly     Questions or conerns outside clinic hours    I will call the 24 hour nurse line at [phone]    Patient needs to schedule an appointment    I will call the 24 hour scheduling team at [phone] or clinic directly    Same day treatment     I will call the clinic first, nurse line if after hours, urgent care and express care if needed                                  DX V65.8 REPLACED WITH [id number] HEALTH CARE HOME (04/08/2013)      Iron deficiency anemia 02/23/2013     Priority: Medium    Vitamin B 12 deficiency 02/23/2013     Priority: Medium    Advanced directives, counseling/discussion 02/20/2013     Priority: Medium     Pt stated upon admission that he has a HCD and on of his sons will bring to [facility].      Stage 3b chronic kidney disease (H) 05/12/2011     Priority: Medium    Hyperlipidemia with target LDL less than 100      Priority: Medium     Diagnosis updated by automated process. Provider to review and confirm.      Hypertension goal BP (blood pressure) < 140/90      Priority: Medium    Osteoporosis 04/30/2007     Priority: Medium     Problem list name updated by automated process. Provider to review      Moderate persistent asthma 08/13/2005     Priority: Medium    Benign neoplasm of colon 06/10/2004     Priority: Medium     Ademonatous polyp      Localized osteoarthrosis, lower leg 06/10/2004     Priority: Medium     left knee  Problem list name updated by automated process. Provider to review      Allergic rhinitis 06/10/2004     Priority: Medium     Problem list name updated by automated process. Provider to review        Past Medical History:   Diagnosis Date    Allergic  rhinitis, cause unspecified     Anemia, unspecified     Aortic stenosis S/P TAVR     Benign neoplasm of colon 1999    Ademonatous polyp    CHF 2nd to TAVR -- S/P Pacemaker 12/2020    CKD (chronic kidney disease) stage 3, GFR 30-59 ml/min (H) 05/12/2011    Community acquired pneumonia 09/29/2020    Coronary atherosclerosis of unspecified type of vessel, native or graft     s/p CABG 2002    Esophageal ulcer w UGI bleed 05/24/2020    Had EGD adn caurtery 5/24/20, colonoscopy with diverticulosis then    Hyperlipidemia LDL goal < 100     Hypertension goal BP (blood pressure) < 140/90     Localized osteoarthrosis not specified whether primary or secondary, lower leg     left knee    Mild persistent asthma without complication 01/11/2016    Moderate persistent asthma     JOSÉ ANTONIO (obstructive sleep apnea)     Persistent atrial fibrillation (H)     Unspecified hearing loss      Past Surgical History:   Procedure Laterality Date    CARDIAC SURGERY      COLONOSCOPY N/A 5/26/2020    Procedure: COLONOSCOPY;  Surgeon: Ignacio Fournier MD;  Location:  GI    CV ANGIOGRAM CORONARY GRAFT N/A 4/24/2020    Procedure: Angiogram Coronary Graft;  Surgeon: Addison Willard MD;  Location: [facility] CARDIAC CATH LAB    CV CORONARY ANGIOGRAM N/A 4/24/2020    Procedure: Coronary Angiogram;  Surgeon: Addison Willard MD;  Location:  HEART CARDIAC CATH LAB    CV RIGHT HEART CATH MEASUREMENTS RECORDED N/A 4/24/2020    Procedure: Right Heart Cath;  Surgeon: Addison Willard MD;  Location:  HEART CARDIAC CATH LAB    CV TRANSCATHETER AORTIC VALVE REPLACEMENT N/A 7/14/2020    Procedure: Transcatheter Aortic Valve Replacement;  Surgeon: Soraida Monet MD;  Location:  HEART CARDIAC CATH LAB    EP PACEMAKER N/A 7/15/2020    Procedure: EP Pacemaker;  Surgeon: Tommie Sauer MD;  Location:  HEART CARDIAC CATH LAB    HC ESOPHAGOSCOPY, DIAGNOSTIC  5/03    Dr. Dow, lower esophageal ring & mild gastritis     HERNIORRHAPHY INGUINAL Right 9/26/2016    Procedure: HERNIORRHAPHY INGUINAL;  Surgeon: Alexis Alexandra MD;  Location:  SD    HERNIORRHAPHY INGUINAL Left 1/13/2023    Procedure: LEFT GROIN EXPLORATION, SEGMENTAL SMALL BOWEL RESECTION, TRANSINGUINAL REPAIR OF INCARCERATED/STRANGULATED HERNIA;  Surgeon: Dwayne Russell MD;  Location:  OR    LAPAROSCOPIC CHOLECYSTECTOMY  12/03    for biliary sludge    REVERSE ARTHROPLASTY SHOULDER Right 2/25/2022    Procedure: RIGHT REVERSE SHOULDER ARTHROPLASTY, WITH OPEN REDUCTION INTERNAL FIXATION, WITH NO CUSTOM GUIDE;  Surgeon: Wiley Vargas MD;  Location:  OR    ZZC NONSPECIFIC PROCEDURE  5/02    Coronary artery bypass    [facility] COLONOSCOPY THRU STOMA W BIOPSY/CAUTERY TUMOR/POLYP/LESION  5/03    Dr. Dow, Q 5 years    [facility]/CAUTERY TUMOR/POLYP/LESION  12/199    Dr. Dow, one adenomatous polyp     Current Outpatient Medications   Medication Sig Dispense Refill    acetaminophen (TYLENOL) 500 MG tablet Take 1,000 mg by mouth every 8 hours as needed      albuterol (PROAIR HFA/PROVENTIL HFA/VENTOLIN HFA) 108 (90 Base) MCG/ACT inhaler INHALE 1 TO 2 PUFFS BY MOUTH EVERY 4 TO 6 HOURS AS NEEDED 18 g 2    aspirin (ASA) 81 MG EC tablet Take 81 mg by mouth 2 times daily 0700 1700      budesonide (PULMICORT) 0.5 MG/2ML neb solution Take 2 mLs (0.5 mg) by nebulization 2 times daily 120 mL 3    calcium carbonate 600 mg-vitamin D 400 units (CALTRATE) 600-400 MG-UNIT per tablet Take 1 tablet by mouth daily      dabigatran ANTICOAGULANT (PRADAXA ANTICOAGULANT) 75 MG capsule TAKE 1 CAPSULE BY MOUTH 2 TIMES DAILY. 60 capsule 11    hydrALAZINE (APRESOLINE) 25 MG tablet Take 1 tablet (25 mg) by mouth 3 times daily 270 tablet 3    ipratropium - albuterol 0.5 mg/2.5 mg/3 mL (DUONEB) 0.5-2.5 (3) MG/3ML neb solution Inhale 1 vial (3 mLs) into the lungs 4 times daily 360 mL 11    isosorbide mononitrate (IMDUR) 60 MG 24 hr tablet Take 1 tablet (60  "mg) by mouth daily Can take 2 pills of home supply of 30 mg tabs to equal 60 mg daily to finish existing home supply. 90 tablet 3    magnesium chloride (MAG64) 535 (64 Mg) MG TBEC CR tablet Take 1 tablet (535 mg) by mouth 2 times daily 180 tablet 3    metoprolol tartrate (LOPRESSOR) 25 MG tablet Take 1 tablet (25 mg) by mouth 2 times daily 180 tablet 3    montelukast (SINGULAIR) 10 MG tablet Take 1 tablet (10 mg) by mouth daily 90 tablet 1    multivitamin, therapeutic (THERA-VIT) TABS tablet Take 1 tablet by mouth 2 times daily      nitroGLYcerin (NITROSTAT) 0.4 MG sublingual tablet For chest pain place 1 tablet under the tongue every 5 minutes for 3 doses. If symptoms persist 5 minutes after 1st dose call 911. 12 tablet 0    pantoprazole (PROTONIX) 40 MG EC tablet TAKE 1 TABLET BEFORE MEALS TWICE A DAY Strength: 40 mg 180 tablet 1    simvastatin (ZOCOR) 40 MG tablet Take 1 tablet (40 mg) by mouth at bedtime 90 tablet 3    torsemide (DEMADEX) 5 MG tablet Take 10 mg every morning and 5 mg every afternoon 270 tablet 3    triamcinolone (KENALOG) 0.1 % external lotion Apply topically 2 times daily 60 mL 1       No Known Allergies     Social History     Tobacco Use    Smoking status: Never    Smokeless tobacco: Never    Tobacco comments:     smoked for a week in 20's   Substance Use Topics    Alcohol use: Not Currently     Alcohol/week: 0.0 standard drinks of alcohol     History   Drug Use No             Objective    BP (!) 153/70 (BP Location: Right arm, Patient Position: Sitting, Cuff Size: Adult Regular)   Pulse 50   Temp 97.2  F (36.2  C) (Temporal)   Resp 15   Ht 1.702 m (5' 7\")   Wt 68.9 kg (152 lb)   SpO2 98%   BMI 23.81 kg/m     Estimated body mass index is 23.81 kg/m  as calculated from the following:    Height as of this encounter: 1.702 m (5' 7\").    Weight as of this encounter: 68.9 kg (152 lb).  Physical Exam  GENERAL: alert and no distress  EYES: Eyes grossly normal to inspection, PERRL and " conjunctivae and sclerae normal  HENT: ear canals and TM's normal, nose and mouth without ulcers or lesions  NECK: no adenopathy, no asymmetry, masses, or scars  RESP: lungs clear to auscultation - no rales, rhonchi or wheezes  CV: regular rate and rhythm, normal S1 S2, no S3 or S4, no murmur, click or rub, no peripheral edema  ABDOMEN: soft, nontender, no hepatosplenomegaly, no masses and bowel sounds normal  MS: no gross musculoskeletal defects noted, no edema  SKIN: no suspicious lesions or rashes  NEURO: Normal strength and tone, mentation intact and speech normal  PSYCH: mentation appears normal, affect normal/bright    Recent Labs   Lab Test 05/17/24  0821 02/15/24  1214 02/11/24  0624 02/10/24  0535 02/09/24  0527   HGB  --   --   --  8.7* 9.0*   PLT  --   --   --  211 218    142   < > 138 138   POTASSIUM 4.7 4.4   < > 4.9 4.7   CR 1.88* 1.75*   < > 1.94* 2.00*    < > = values in this interval not displayed.        Diagnostics  Labs pending at this time.  Results will be reviewed when available.   No EKG required for low risk surgery (cataract, skin procedure, breast biopsy, etc).    Revised Cardiac Risk Index (RCRI)  The patient has the following serious cardiovascular risks for perioperative complications:   - Coronary Artery Disease (MI, positive stress test, angina, Qs on EKG) = 1 point   - Congestive Heart Failure (pulmonary edema, PND, s3 jyoti, CXR with pulmonary congestion, basilar rales) = 1 point     RCRI Interpretation: 2 points: Class III (moderate risk - 6.6% complication rate)     Estimated Functional Capacity: Performs 4 METS exercise without symptoms (e.g., light housework, stairs, 4 mph walk, 7 mph bike, slow step dance)           Signed Electronically by: Alessandra Masterson PA-C  Copy of this evaluation report is provided to requesting physician.

## 2024-07-11 NOTE — PATIENT INSTRUCTIONS
How to Take Your Medication Before Surgery  Preoperative Medication Instructions   Antiplatelet or Anticoagulation Medication Instructions   - dabigatran (Pradaxa): Bleeding risk is low for this procedure AND CrCl (>=) 80 mL/min. DO NOT TAKE 2 DAYS BEFORE AND DAY OF SURGERY.  (Do not take , , )    Additional Medication Instructions  Take all scheduled medications on the day of surgery       Patient Education   Preparing for Your Surgery  Getting started  A nurse will call you to review your health history and instructions. They will give you an arrival time based on your scheduled surgery time. Please be ready to share:  Your doctor's clinic name and phone number  Your medical, surgical, and anesthesia history  A list of allergies and sensitivities  A list of medicines, including herbal treatments and over-the-counter drugs  Whether the patient has a legal guardian (ask how to send us the papers in advance)  Please tell us if you're pregnant--or if there's any chance you might be pregnant. Some surgeries may injure a fetus (unborn baby), so they require a pregnancy test. Surgeries that are safe for a fetus don't always need a test, and you can choose whether to have one.   If you have a child who's having surgery, please ask for a copy of Preparing for Your Child's Surgery.    Preparing for surgery  Within 10 to 30 days of surgery: Have a pre-op exam (sometimes called an H&P, or History and Physical). This can be done at a clinic or pre-operative center.  If you're having a , you may not need this exam. Talk to your care team.  At your pre-op exam, talk to your care team about all medicines you take. If you need to stop any medicines before surgery, ask when to start taking them again.  We do this for your safety. Many medicines can make you bleed too much during surgery. Some change how well surgery (anesthesia) drugs work.  Call your insurance company to let them know you're having surgery. (If  you don't have insurance, call [phone].)  Call your clinic if there's any change in your health. This includes signs of a cold or flu (sore throat, runny nose, cough, rash, fever). It also includes a scrape or scratch near the surgery site.  If you have questions on the day of surgery, call your hospital or surgery center.  Eating and drinking guidelines  For your safety: Unless your surgeon tells you otherwise, follow the guidelines below.  Eat and drink as usual until 8 hours before you arrive for surgery. After that, no food or milk.  Drink clear liquids until 2 hours before you arrive. These are liquids you can see through, like water, Gatorade, and Propel Water. They also include plain black coffee and tea (no cream or milk), candy, and breath mints. You can spit out gum when you arrive.  If you drink alcohol: Stop drinking it the night before surgery.  If your care team tells you to take medicine on the morning of surgery, it's okay to take it with a sip of water.  Preventing infection  Shower or bathe the night before and morning of your surgery. Follow the instructions your clinic gave you. (If no instructions, use regular soap.)  Don't shave or clip hair near your surgery site. We'll remove the hair if needed.  Don't smoke or vape the morning of surgery. You may chew nicotine gum up to 2 hours before surgery. A nicotine patch is okay.  Note: Some surgeries require you to completely quit smoking and nicotine. Check with your surgeon.  Your care team will make every effort to keep you safe from infection. We will:  Clean our hands often with soap and water (or an alcohol-based hand rub).  Clean the skin at your surgery site with a special soap that kills germs.  Give you a special gown to keep you warm. (Cold raises the risk of infection.)  Wear special hair covers, masks, gowns and gloves during surgery.  Give antibiotic medicine, if prescribed. Not all surgeries need antibiotics.  What to bring on the  day of surgery  Photo ID and insurance card  Copy of your health care directive, if you have one  Glasses and hearing aids (bring cases)  You can't wear contacts during surgery  Inhaler and eye drops, if you use them (tell us about these when you arrive)  CPAP machine or breathing device, if you use them  A few personal items, if spending the night  If you have . . .  A pacemaker, ICD (cardiac defibrillator) or other implant: Bring the ID card.  An implanted stimulator: Bring the remote control.  A legal guardian: Bring a copy of the certified (court-stamped) guardianship papers.  Please remove any jewelry, including body piercings. Leave jewelry and other valuables at home.  If you're going home the day of surgery  You must have a responsible adult drive you home. They should stay with you overnight as well.  If you don't have someone to stay with you, and you aren't safe to go home alone, we may keep you overnight. Insurance often won't pay for this.  After surgery  If it's hard to control your pain or you need more pain medicine, please call your surgeon's office.  Questions?   If you have any questions for your care team, list them here: _________________________________________________________________________________________________________________________________________________________________________ ____________________________________ ____________________________________ ____________________________________  For informational purposes only. Not to replace the advice of your health care provider. Copyright   2003, 2019 [facility]. All rights reserved. Clinically reviewed by Elisha Castrejon MD. [employer] [id number] - REV 12/22.

## 2024-08-05 ENCOUNTER — OFFICE VISIT (OUTPATIENT)
Dept: FAMILY MEDICINE | Facility: CLINIC | Age: 89
End: 2024-08-05
Payer: COMMERCIAL

## 2024-08-05 VITALS
SYSTOLIC BLOOD PRESSURE: 124 MMHG | HEART RATE: 50 BPM | TEMPERATURE: 97.7 F | WEIGHT: 141 LBS | RESPIRATION RATE: 14 BRPM | DIASTOLIC BLOOD PRESSURE: 48 MMHG | OXYGEN SATURATION: 97 % | BODY MASS INDEX: 23.49 KG/M2 | HEIGHT: 65 IN

## 2024-08-05 DIAGNOSIS — I50.42 CHRONIC COMBINED SYSTOLIC AND DIASTOLIC HEART FAILURE (H): Primary | ICD-10-CM

## 2024-08-05 DIAGNOSIS — J45.40 MODERATE PERSISTENT ASTHMA WITHOUT COMPLICATION: ICD-10-CM

## 2024-08-05 PROCEDURE — G2211 COMPLEX E/M VISIT ADD ON: HCPCS | Performed by: FAMILY MEDICINE

## 2024-08-05 PROCEDURE — 99214 OFFICE O/P EST MOD 30 MIN: CPT | Performed by: FAMILY MEDICINE

## 2024-08-05 ASSESSMENT — PAIN SCALES - GENERAL: PAINLEVEL: NO PAIN (0)

## 2024-08-05 NOTE — PROGRESS NOTES
Assessment & Plan     Chronic combined systolic and diastolic heart failure (H)  Seeing cardiologist. Breathing is stable. Mild leg swelling. Weight is stable.     Moderate persistent asthma without complication  Breathing is stable. Doing duoneb and pulmicort. No new breathing concerns.                 Javon Weldon is a 89 year old, presenting for the following health issues:  Hypertension (3M F/U)        8/5/2024     9:08 AM   Additional Questions   Roomed by Mercy SOUSA MA   Accompanied by Self     History of Present Illness     Asthma:  He presents for follow up of asthma.  He has no cough, no wheezing, and no shortness of breath.  He is using a relief medication every 4 hours. He does not miss any doses of his controller medication throughout the week. Patient is aware of the following triggers: humidity. The patient has not had a visit to the Emergency Room, Urgent Care or Hospital due to asthma since the last clinic visit.     Heart Failure:  He presents for follow up of heart failure. He is not experiencing shortness of breath at night, with rest or with activity  He is experiencing lower extremity edema which is same as usual.   He has orthopenea and is not coughing at night. Patient is checking weight daily. He has recently had a None.  He has no side effects from medications.  He has had no other medical visits for heart failure since the last visit.    Hypertension: He presents for follow up of hypertension.  He does check blood pressure  regularly outside of the clinic. Outside blood pressures have been over 140/90. He follows a low salt diet.     He eats 2-3 servings of fruits and vegetables daily.He consumes 0 sweetened beverage(s) daily.He exercises with enough effort to increase his heart rate 20 to 29 minutes per day.  He exercises with enough effort to increase his heart rate 7 days per week.   He is taking medications regularly.    Having cataract tomorrow. Had preop done previously.  "    Heart and breathing doing well.   Still walking daily. Still working.   Been to cardiologist - May and will see them again next month.   No medication were adjusted.   Currently predexa is on hold due to eye surgery.     Asthma - breathing is fine. Twice per day nebulizer pulmicort. Duoneb 4 times per day. Once in a great while needing albuterol but otherwise none. Humidity makes it worse.     Elevation of feet helps with leg swelling. No major leg swelling. Water pill daily use.         Objective    /48 (BP Location: Right arm, Patient Position: Sitting, Cuff Size: Adult Regular)   Pulse 50   Temp 97.7  F (36.5  C) (Temporal)   Resp 14   Ht 1.657 m (5' 5.25\")   Wt 64 kg (141 lb)   SpO2 97%   BMI 23.28 kg/m    Body mass index is 23.28 kg/m .  Physical Exam   Heart RRR  Lungs clear  Both lower limbs mild swelling L>R        Signed Electronically by: Dany Rodriguez MD, MD    "

## 2024-08-21 NOTE — PROGRESS NOTES
[facility]    Hospitalist Progress Note      Assessment & Plan   Shiraz Ingram is a 85 year old male who was admitted on 5/23/2020 with lightheadedness and melena, found to have acute GI bleed requiring blood transfusion.    Acute GI bleed, bleeding esophageal ulcer  Acute blood loss anemia  Chronic anemia  Lightheaded for for a few days PTA, developed melena on the day of admission.  Nauseous with some dry heaves but no emesis. Hemoglobin 6.7, down from 8.4 two days prior to admission and baseline of around 11 earlier this month. Hemoccult positive in the ER. Hemodynamically stable in the ER, however lactic acid was elevated to 6.0.  - Continue ICU monitoring for next 24 hours.  - GI consulted--took for EGD AM of 5/24 found bleeding esophageal ulcer treated with cautery and APC after failed injection. Unsure if small bowel bleed present as well  - Transfused 2 units PRBCs on admit.  - Protonix infusion  - NPO  - CTA or RBC tagged study if further bleeding.  - Pradaxa on hold  - Gentle IVF with 40ml/hr NS x1L total (holding fluids while receiving blood transfusions)  - Goal Hgb 8-9 considering cardiac history and elevated troponin  - Conditional orders entered for hemoglobin <8 to transfuse 1 unit PRBCs     COVID-19 testing--negative  -Asymptomatic COVID-19 testing obtained in anticipation of endoscopy (negative)      Coronary artery disease status post CABG in the early 2000's  NSTEMI, type 2 secondary to demand  Asymptomatic.  -Troponin 0 0.035 two days prior to admission, trop 0.435--secondary to demand. Add on 2 more troponins to trend to ensure not rising rapidly  -Hold PTA aspirin   -Resume simvastatin     Severe aortic stenosis  Followed by cardiology, has been undergoing TAVR work-up with plans for TAVR in the next month or 2.  - Cardiology consult appreciated     Atrial fibrillation  -Not on any chronic rate or rhythm control medications.  Heart rate in the 80s in the ER.  -Chronically  Msg sent to [facility]    anticoagulated with Pradaxa, hold for now in setting of GI bleed.     Left atrial appendage thrombosis  -Diagnosed during work-up for TAVR earlier this year in February.  -Treated with Pradaxa.  -He had a echocardiogram in April 2020 which did not show any clear-cut thrombus, but in some views did show an early/P clot appearance.  -Hold Pradaxa for now as noted above.     Chronic diastolic congestive heart failure  -Not on a diuretic at baseline. Echo as above with EF 50-55% with moderate concentric LVH, moderate decreased RVSF  -Monitor volume status closely as he will be getting blood and IV fluids in the setting of a GI bleed.     Hypertension  -PTA not on any medications for BP control, lower BPs in setting of GI bleed  - Monitor     Hyperlipidemia  - Resume PTA statin     Moderate persistent asthma  -Currently asymptomatic.  -Continue PTA duo nebs QID and Pulmicort BID     Chronic kidney disease stage III  -Baseline creatinine is in the 1.4-1.7 range.   -Cr 1.62 on admit, trended to 1.45 overnight  -Recheck labs in a.m.     Senile hearing loss  Uses ASL to communicate, plans for one at bedside as noted in nursing note  - Otherwise communicates well via writing    DVT Prophylaxis: Pneumatic Compression Devices  Code Status: DNR/DNI  Expected discharge: 3-4 days, pending resolution of bleeding and stabilization of hgb. Will likely consult PT once Hgb stabilizes and he is out of ICU    Roselia Parson, DO  Text Page (7am - 6pm)    Interval History   Patient seen and examined. Updated him on results of his EGD, he understands the plan. Denies any current symptoms of lightheaded-ness, but states that he hasn't gotten up yet so he is unsure if he will be dizzy. Denies abdominal or throat discomfort. Asked that I update his son Nicko (spoke at 1300 on 5/24).    -Data reviewed today: I reviewed all new labs and imaging results over the last 24 hours. I personally reviewed no new imaging or EKGs.    Physical Exam   Temp:  98.3  F (36.8  C) Temp src: Oral BP: (!) 86/74 Pulse: 73 Heart Rate: 76 Resp: 23 SpO2: 95 % O2 Device: None (Room air) Oxygen Delivery: 4 LPM  Vitals:    05/24/20 0100   Weight: 67.7 kg (149 lb 4 oz)     Vital Signs with Ranges  Temp:  [96.8  F (36  C)-99.5  F (37.5  C)] 98.3  F (36.8  C)  Pulse:  [70-95] 73  Heart Rate:  [70-98] 76  Resp:  [10-32] 23  BP: ()/() 86/74  SpO2:  [82 %-100 %] 95 %  I/O last 3 completed shifts:  In: 1279.33 [I.V.:179.33; IV Piggyback:1100]  Out: 1 [Urine:1]    Constitutional: Awake, alert, cooperative, no apparent distress  Respiratory: Clear to auscultation bilaterally, no crackles or wheezing  Cardiovascular: Regular rate and rhythm, normal S1 and S2, and + systolic murmur  GI: Normal bowel sounds, soft, non-distended, non-tender  Skin/Integumen: No rashes, no cyanosis, no edema  Other: Hard of hearing, speech somewhat difficult to understand, but better when he speaks slower.    Medications     - MEDICATION INSTRUCTIONS -       pantoprazole (PROTONIX) infusion ADULT/PEDS GREATER than or EQUAL to 45 kg 8 mg/hr (05/24/20 1136)     - MEDICATION INSTRUCTIONS -       sodium chloride         budesonide  0.5 mg Nebulization BID     diphenhydrAMINE  25 mg Oral Once     ipratropium - albuterol 0.5 mg/2.5 mg/3 mL  3 mL Nebulization 4x daily     simvastatin  40 mg Oral At Bedtime       Data   Recent Labs   Lab 05/24/20  0950 05/24/20  0752 05/24/20  0353  05/23/20  2104 05/21/20 2008   WBC  --  4.3  --   --  8.1 4.5   HGB 7.2* Canceled, Test credited  7.6* 6.5*   < > 6.7* 8.4*   MCV  --  73*  --   --  68* 65*   PLT  --  140*  --   --  228 189   INR  --   --   --   --  2.18*  --    NA  --   --  136  --  137 132*   POTASSIUM  --   --  4.7  --  4.9 4.6   CHLORIDE  --   --  109  --  106 100   CO2  --   --  23  --  23 25   BUN  --   --  62*  --  61* 57*   CR  --   --  1.45*  --  1.62* 1.66*   ANIONGAP  --   --  4  --  8 7   AYALA  --   --  8.5  --  9.3 9.1   GLC  --   --  134*  --  189*  116*   ALBUMIN  --   --   --   --  3.3*  --    PROTTOTAL  --   --   --   --  6.3*  --    BILITOTAL  --   --   --   --  0.7  --    ALKPHOS  --   --   --   --  43  --    ALT  --   --   --   --  23  --    AST  --   --   --   --  20  --    TROPI  --  0.435*  --   --   --  0.035    < > = values in this interval not displayed.       Recent Results (from the past 24 hour(s))   CT Abdomen Pelvis w/o Contrast    Narrative    EXAM: CT ABDOMEN PELVIS W/O CONTRAST  LOCATION: [facility]  DATE/TIME: 5/23/2020 9:54 PM    INDICATION: Abdominal pain. Rectal bleeding.  COMPARISON: 02/25/2020  TECHNIQUE: CT scan of the abdomen and pelvis was performed without IV contrast. Multiplanar reformats were obtained. Dose reduction techniques were used.  CONTRAST: None.    FINDINGS:   LOWER CHEST: Sternotomy. A few small scattered calcified granulomata with a few of the nodules uncalcified. Segmental elevation of the left hemidiaphragm.     HEPATOBILIARY: Cholecystectomy.    PANCREAS: Normal.    SPLEEN: Normal.    ADRENAL GLANDS: Normal.    KIDNEYS/BLADDER: There are multiple (greater than 10), bilateral renal cysts present many of the cysts mildly hypodense compatible with complex cysts. These likely contain proteinaceous debris or hemorrhage.    BOWEL: Normal.    LYMPH NODES: Normal.    VASCULATURE: 2.8 cm infrarenal abdominal aortic ectasia.    PELVIC ORGANS: Mild prostatic enlargement.    MUSCULOSKELETAL: Demineralization. Moderate compression of T12 with kyphosis at this level. Mild compression of superior endplate of L3. Stable L1 compression fracture.      Impression    IMPRESSION:   1.  Multiple bilateral renal cysts many of which are complicated. These likely containing proteinaceous debris or blood products and are similar to the prior exam.    2.  Cholecystectomy.

## 2024-08-27 DIAGNOSIS — K22.11 ULCER OF ESOPHAGUS WITH BLEEDING: ICD-10-CM

## 2024-08-28 RX ORDER — PANTOPRAZOLE SODIUM 40 MG/1
TABLET, DELAYED RELEASE ORAL
Qty: 180 TABLET | Refills: 1 | OUTPATIENT
Start: 2024-08-28

## 2024-09-03 ENCOUNTER — OFFICE VISIT (OUTPATIENT)
Dept: CARDIOLOGY | Facility: CLINIC | Age: 89
End: 2024-09-03
Attending: PHYSICIAN ASSISTANT
Payer: COMMERCIAL

## 2024-09-03 VITALS
OXYGEN SATURATION: 96 % | BODY MASS INDEX: 23.46 KG/M2 | HEIGHT: 65 IN | HEART RATE: 53 BPM | DIASTOLIC BLOOD PRESSURE: 73 MMHG | SYSTOLIC BLOOD PRESSURE: 142 MMHG

## 2024-09-03 DIAGNOSIS — K22.11 ULCER OF ESOPHAGUS WITH BLEEDING: ICD-10-CM

## 2024-09-03 DIAGNOSIS — I50.9 ACUTE ON CHRONIC CONGESTIVE HEART FAILURE, UNSPECIFIED HEART FAILURE TYPE (H): ICD-10-CM

## 2024-09-03 DIAGNOSIS — E78.5 HYPERLIPIDEMIA WITH TARGET LDL LESS THAN 100: ICD-10-CM

## 2024-09-03 DIAGNOSIS — I50.31 ACUTE DIASTOLIC HEART FAILURE (H): ICD-10-CM

## 2024-09-03 DIAGNOSIS — Z95.2 S/P TAVR (TRANSCATHETER AORTIC VALVE REPLACEMENT): ICD-10-CM

## 2024-09-03 DIAGNOSIS — I44.2 COMPLETE HEART BLOCK (H): ICD-10-CM

## 2024-09-03 PROCEDURE — 99214 OFFICE O/P EST MOD 30 MIN: CPT | Performed by: PHYSICIAN ASSISTANT

## 2024-09-03 PROCEDURE — G2211 COMPLEX E/M VISIT ADD ON: HCPCS | Performed by: PHYSICIAN ASSISTANT

## 2024-09-03 PROCEDURE — T1013 SIGN LANG/ORAL INTERPRETER: HCPCS | Mod: U3 | Performed by: PHYSICIAN ASSISTANT

## 2024-09-03 RX ORDER — PANTOPRAZOLE SODIUM 40 MG/1
TABLET, DELAYED RELEASE ORAL
Qty: 180 TABLET | Refills: 1 | Status: SHIPPED | OUTPATIENT
Start: 2024-09-03

## 2024-09-03 NOTE — PATIENT INSTRUCTIONS
It was great to see you today! Your Cardiology Team includes myself and Dr. Field.     Recommendations from today:  Please, remember to continue the followin.  Weigh yourself daily. Call if your weight is up > than 2 pounds in one day, or 5 pounds in one week; if you feel more short of breath or have worsening swelling in your legs or abdomen.   Remember to wear similar clothes when you weigh yourself and aim to do it around the same time daily.   2.  Eat a low sodium diet (less than 2,000mg or 2g daily). If you eat less salt, you will retain less fluid.     Follow up in 4 months with me.     For scheduling questions, our 24 hours scheduling line is available at [phone].    To reach our nursing team, please call [phone]. Our nursing team is available 8-4:30 Monday-Friday. If you have a medical emergency, please call [phone].

## 2024-09-03 NOTE — PROGRESS NOTES
HEART CARE FOLLOW UP    Primary Care: Dany Rodriguez MD  Primary Cardiologist: Dr Field      Assessment/Recommendations   Congestive heart failure  Heart failure with midrange and now improved ejection fraction  Diastolic heart failure  Moderate pulmonary hypertension  Most recent echocardiogram in February of this year showed an ejection fraction of 55 to 60%.  The RV is mildly dilated with moderately reduced function.  He is doing very well and stable from when we met 3 months ago. Weight stable, at home it varies between 140-144lbs. Renal function stable. He does appear euvolemic. Functional capacity excellent for his age.   Continue hydralazine 25 mg 3 times daily  Continue Imdur 60 mg daily  Continue Toprol tartrate 25 mg twice a day  Continue torsemide 10 mg in the morning and 5 mg in the afternoon  Compliance with CPAP is essential   Status post TAVR, most recent echocardiogram in February of this year shows Medtronic valve in the aortic position and a normal gradient.  Repeat TTE in February 2025  Atrial Fibrillation, most recent EKG preformed 2/2024 showed atrial fibrillation with slow ventricular response. The patient is currently on rate control therapy. Most recent device interrogation reviewed.   Continue metoprolol  Educated to watch closely for dizziness and exertional limitations  Pacing 64%  Continue pradaza for anticoagulation, no evidence of abnormal bleeding or bruising  Hypertension, blood pressure above goal on current regimen upon arrival but improved at the end of the visit. Reports medication compliance.   Continue regimen as is, Imdur recently increased   Does not have home BP cuff        Return to care in  4 months  with me.      History of Present Illness/Subjective    Shiraz Ingram is a 89 year old male with past medical history significant for:  Chronic biventricular heart failure  Heart failure with midrange ejection fraction  Hypertension  Severe aortic stenosis status  post TAVR  Status post 29 mm Medtronic implanted in July 2020, mean gradient around 9 mmHg  Atrial fibrillation, chronic  History of MAYA thrombus on anticoagulation  Coronary artery disease  Status post coronary artery bypass grafting with LIMA to the LAD, vein graft to the OM and the RCA  Complete Heart Block s/p PPPM  Complication of TAVR  Hyperlipidemia  CKD stage III  Obstructive sleep apnea  Small bowel obstruction, January 2023    Late last year and earlier this year he was frequently admitted with episodes of heart failure.  Most recent hospitalization was in February of this year.  I saw him on May 20 and he was doing mostly well.  He continue to work a pulsing furniture and lived in the same house he lived in for 50 years.  His 2 sons lived with him and helped him with household work.  He can walk around the block without major limitations but if he does not use his cane he would have dyspnea.  He had rare dizziness and never had presyncope or syncope.  At that visit his medications were continued the same and we reviewed some heart failure education.  Since I last saw him he is not have any major medical illnesses or hospitalizations in our system.  He was seen on July 11 for preop prior to cataract surgery.  On August 5 he was seen by family medicine for his asthma and heart failure.  It was noticed blood pressure was slightly elevated.    Today the patient tells me he's doing well, no concerns. He continues to work in furniture [employer], do light housework and walk around the block. If his breathing is good, he doesn't take breaks. On a good day he can walk a little further. Has very rare dizziness/lightheadedness with position change, but never pre-syncope or falls. No  palpitations or racing heart. Sleep well, frequent urination, one pillow without orthopnea or pND. Mild swelling in his legs, worse after a day of work. Elevates his legs at night and this always helps. No cramping. No claudication  "symptoms. No blood in stool or urine. No fevers, chills or cough.          Data Review Today:   TTE February 8, 2024:  The visual ejection fraction is 55-60%.  Left ventricular systolic function is normal.  The right ventricle is mildly dilated.  The right ventricular systolic function is moderately reduced.  29mm Medtronic Evolut Pro TAVR valve in the aortic position.,  The gradient is normal for this prosthetic aortic valve.  Right ventricular systolic pressure is elevated, consistent with moderate  pulmonary hypertension.  The rhythm was atrial fibrillation.  The study was technically difficult. The study was technically limited.     Echocardiogram June 20, 2022:  Left ventricular systolic function is mildly reduced.  The visual ejection fraction is 45-50%.  There is a bioprosthetic aortic valve. 29mm Medtronic Evolut Pro, implanted  7/2020  The mean AoV pressure gradient is 9mmHg.  Right ventricular systolic pressure is elevated, consistent with mild  pulmonary hypertension.  Compared to 6/21, mean gradients across bioprosthetic aortic valve is  unchanged. EF is likley unchanged. The study was technically difficult               I have reviewed and updated the patient's past medical history, allergy list and medication list.          Physical Examination   Vitals: BP (!) 142/73   Pulse 53   Ht 1.651 m (5' 5\")   SpO2 96%   BMI 23.46 kg/m      BMI= Body mass index is 23.46 kg/m .    Wt Readings from Last 3 Encounters:   08/05/24 64 kg (141 lb)   07/11/24 68.9 kg (152 lb)   05/20/24 64 kg (141 lb)       General :   Alert and oriented, in no acute distress.    HEENT:  Normocephalic and atraumatic. .    Neck: No JVP, carotid bruit or obvious thyromegaly.   Lungs:   Respirations unlabored. Clear bilaterally with no rales, rhonchi, or wheezes.     Cardiovascular:   Rhythm is regular. S1 and S2 are normal. Systolic ejection murmur is present. Lower extremities demonstrate 1+ edema.        Skin: Skin is warm, dry, " and otherwise intact.   Neurologic: Gait not assessed. Mood and affect appropriate.           Medical History  Surgical History Family History Social History   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    CKD (chronic kidney disease) stage 3, GFR 30-59 ml/min (H) 05/12/2011    Community acquired pneumonia 09/29/2020    Coronary atherosclerosis of unspecified type of vessel, native or graft     s/p CABG 2002    Esophageal ulcer w UGI bleed 05/24/2020    Had EGD adn caurtery 5/24/20, colonoscopy with diverticulosis then    Hyperlipidemia LDL goal < 100     Hypertension goal BP (blood pressure) < 140/90     Localized osteoarthrosis not specified whether primary or secondary, lower leg     left knee    Mild persistent asthma without complication 01/11/2016    Moderate persistent asthma     JOSÉ ANTONIO (obstructive sleep apnea)     Persistent atrial fibrillation (H)     Unspecified hearing loss     Past Surgical History:   Procedure Laterality Date    CARDIAC SURGERY      COLONOSCOPY N/A 5/26/2020    Procedure: COLONOSCOPY;  Surgeon: Ignacio Fournier MD;  Location:  GI    CV ANGIOGRAM CORONARY GRAFT N/A 4/24/2020    Procedure: Angiogram Coronary Graft;  Surgeon: Addison Willard MD;  Location:  HEART CARDIAC CATH LAB    CV CORONARY ANGIOGRAM N/A 4/24/2020    Procedure: Coronary Angiogram;  Surgeon: Addison Willard MD;  Location:  HEART CARDIAC CATH LAB    CV RIGHT HEART CATH MEASUREMENTS RECORDED N/A 4/24/2020    Procedure: Right Heart Cath;  Surgeon: Addison Willard MD;  Location:  HEART CARDIAC CATH LAB    CV TRANSCATHETER AORTIC VALVE REPLACEMENT N/A 7/14/2020    Procedure: Transcatheter Aortic Valve Replacement;  Surgeon: Soraida Monet MD;  Location:  HEART CARDIAC CATH LAB    EP PACEMAKER N/A  7/15/2020    Procedure: EP Pacemaker;  Surgeon: Tommie Sauer MD;  Location:  HEART CARDIAC CATH LAB    HC ESOPHAGOSCOPY, DIAGNOSTIC      Dr. Dow, lower esophageal ring & mild gastritis    HERNIORRHAPHY INGUINAL Right 2016    Procedure: HERNIORRHAPHY INGUINAL;  Surgeon: lAexis Alexandra MD;  Location:  [facility]    HERNIORRHAPHY INGUINAL Left 2023    Procedure: LEFT GROIN EXPLORATION, SEGMENTAL SMALL BOWEL RESECTION, TRANSINGUINAL REPAIR OF INCARCERATED/STRANGULATED HERNIA;  Surgeon: Dwayne Russell MD;  Location:  OR    LAPAROSCOPIC CHOLECYSTECTOMY      for biliary sludge    REVERSE ARTHROPLASTY SHOULDER Right 2022    Procedure: RIGHT REVERSE SHOULDER ARTHROPLASTY, WITH OPEN REDUCTION INTERNAL FIXATION, WITH NO CUSTOM GUIDE;  Surgeon: Wiley Vargas MD;  Location:  OR    ZZC NONSPECIFIC PROCEDURE      Coronary artery bypass    ZZHC COLONOSCOPY THRU STOMA W BIOPSY/CAUTERY TUMOR/POLYP/LESION      Dr. Dow, Q 5 years    ZZHC COLONOSCOPY THRU STOMA W BIOPSY/CAUTERY TUMOR/POLYP/LESION      Dr. Dow, one adenomatous polyp    Family History   Problem Relation Age of Onset    C.A.D. Father     Cancer Mother         breast cancer,  of pneumonia    Cerebrovascular Disease Son     CABG Son     Family History Negative Child     Family History Negative Sister     Heart Disease Brother     Social History     Socioeconomic History    Marital status:      Spouse name: Kailey    Number of children: 3    Years of education: Not on file    Highest education level: Not on file   Occupational History    Occupation: [employer]     Employer: RETIRED   Tobacco Use    Smoking status: Never     Passive exposure: Never    Smokeless tobacco: Never    Tobacco comments:     smoked for a week in 20's   Vaping Use    Vaping status: Never Used   Substance and Sexual Activity    Alcohol use: Not Currently     Alcohol/week: 0.0 standard drinks of alcohol    Drug use:  No    Sexual activity: Yes     Partners: Female   Other Topics Concern     Service No    Blood Transfusions No    Caffeine Concern Not Asked    Occupational Exposure Not Asked    Hobby Hazards Not Asked    Sleep Concern Not Asked    Stress Concern Not Asked    Weight Concern Not Asked    Special Diet Not Asked    Back Care Not Asked    Exercise Yes    Bike Helmet Not Asked    Seat Belt Yes    Self-Exams No    Parent/sibling w/ CABG, MI or angioplasty before 65F 55M? Yes   Social History Narrative    Balanced Diet - Yes    Osteoporosis Preventative measures-  Dairy servings per day: 2 to 3 servings daily    Regular Exercise -  Yes Describe walks 1.5 mile daily     Dental Exam up - YES - Date: 2 years ago    Eye Exam - YES - Date: 1 year ago    Self Testicular Exam -  No, handout given    Do you have any concerns about STD's -  No    Abuse: Current or Past (Physical, Sexual or Emotional)- No    Do you feel safe in your environment - Yes    Guns stored in the home - Yes, locked    Sunscreen used - No    Seatbelts used - Yes    Lipids - YES - Date: 3-09    Glucose -  YES - Date: 3-09    Colon Cancer Screening - Colonoscopy 3-08(date completed)    Hemoccults - UNKNOWN    PSA - YES - Date: 11-07    Digital Rectal Exam - UNKNOWN    Immunizations reviewed and up to date - Yes, td 1-2005, had shingles vaccine    5-28-09  [facility] Pat[facility]                     Social Determinants of Health     Financial Resource Strain: Low Risk  (12/27/2023)    Financial Resource Strain     Within the past 12 months, have you or your family members you live with been unable to get utilities (heat, electricity) when it was really needed?: No   Food Insecurity: Low Risk  (12/27/2023)    Food Insecurity     Within the past 12 months, did you worry that your food would run out before you got money to buy more?: No     Within the past 12 months, did the food you bought just not last and you didn t have money to get more?: No   Transportation  Needs: Low Risk  (12/27/2023)    Transportation Needs     Within the past 12 months, has lack of transportation kept you from medical appointments, getting your medicines, non-medical meetings or appointments, work, or from getting things that you need?: No   Physical Activity: Insufficiently Active (10/28/2020)    Exercise Vital Sign     Days of Exercise per Week: 4 days     Minutes of Exercise per Session: 30 min   Stress: Not on file   Social Connections: Unknown (3/19/2021)    Social Connection and Isolation Panel [NHANES]     Frequency of Communication with Friends and Family: More than three times a week     Frequency of Social Gatherings with Friends and Family: Not on file     Attends Anabaptist Services: Not on file     Active Member of Clubs or Organizations: Not on file     Attends Club or Organization Meetings: Not on file     Marital Status:    Interpersonal Safety: Low Risk  (7/11/2024)    Interpersonal Safety     Do you feel physically and emotionally safe where you currently live?: Yes     Within the past 12 months, have you been hit, slapped, kicked or otherwise physically hurt by someone?: No     Within the past 12 months, have you been humiliated or emotionally abused in other ways by your partner or ex-partner?: No   Housing Stability: Low Risk  (12/27/2023)    Housing Stability     Do you have housing? : Yes     Are you worried about losing your housing?: No   Recent Concern: Housing Stability - High Risk (11/1/2023)    Housing Stability     Do you have housing? : No     Are you worried about losing your housing?: No          Medications  Allergies   Scheduled Meds:  Current Outpatient Medications   Medication Sig Dispense Refill    acetaminophen (TYLENOL) 500 MG tablet Take 1,000 mg by mouth every 8 hours as needed      albuterol (PROAIR HFA/PROVENTIL HFA/VENTOLIN HFA) 108 (90 Base) MCG/ACT inhaler INHALE 1 TO 2 PUFFS BY MOUTH EVERY 4 TO 6 HOURS AS NEEDED 18 g 2    aspirin (ASA) 81 MG EC  tablet Take 81 mg by mouth 2 times daily 0700 1700      budesonide (PULMICORT) 0.5 MG/2ML neb solution Take 2 mLs (0.5 mg) by nebulization 2 times daily 120 mL 3    calcium carbonate 600 mg-vitamin D 400 units (CALTRATE) 600-400 MG-UNIT per tablet Take 1 tablet by mouth daily      dabigatran ANTICOAGULANT (PRADAXA ANTICOAGULANT) 75 MG capsule TAKE 1 CAPSULE BY MOUTH 2 TIMES DAILY. 60 capsule 11    hydrALAZINE (APRESOLINE) 25 MG tablet Take 1 tablet (25 mg) by mouth 3 times daily 270 tablet 3    ipratropium - albuterol 0.5 mg/2.5 mg/3 mL (DUONEB) 0.5-2.5 (3) MG/3ML neb solution Inhale 1 vial (3 mLs) into the lungs 4 times daily 360 mL 11    isosorbide mononitrate (IMDUR) 60 MG 24 hr tablet Take 1 tablet (60 mg) by mouth daily Can take 2 pills of home supply of 30 mg tabs to equal 60 mg daily to finish existing home supply. 90 tablet 3    magnesium chloride (MAG64) 535 (64 Mg) MG TBEC CR tablet Take 1 tablet (535 mg) by mouth 2 times daily 180 tablet 3    metoprolol tartrate (LOPRESSOR) 25 MG tablet Take 1 tablet (25 mg) by mouth 2 times daily 180 tablet 3    montelukast (SINGULAIR) 10 MG tablet Take 1 tablet (10 mg) by mouth daily 90 tablet 1    multivitamin, therapeutic (THERA-VIT) TABS tablet Take 1 tablet by mouth 2 times daily      pantoprazole (PROTONIX) 40 MG EC tablet TAKE 1 TABLET BEFORE MEALS TWICE A DAY Strength: 40 mg 180 tablet 1    simvastatin (ZOCOR) 40 MG tablet Take 1 tablet (40 mg) by mouth at bedtime 90 tablet 3    torsemide (DEMADEX) 5 MG tablet Take 10 mg every morning and 5 mg every afternoon 270 tablet 3    nitroGLYcerin (NITROSTAT) 0.4 MG sublingual tablet For chest pain place 1 tablet under the tongue every 5 minutes for 3 doses. If symptoms persist 5 minutes after 1st dose call 911. (Patient not taking: Reported on 9/3/2024) 12 tablet 0    triamcinolone (KENALOG) 0.1 % external lotion Apply topically 2 times daily 60 mL 1    No Known Allergies      Lab Results    Chemistry/lipid CBC Cardiac  Enzymes/BNP/TSH/INR   Lab Results   Component Value Date    CHOL 126 11/01/2023    HDL 43 11/01/2023    TRIG 98 11/01/2023    BUN 68.4 (H) 05/17/2024     07/11/2024    CO2 25 05/17/2024    Lab Results   Component Value Date    WBC 4.3 07/11/2024    HGB 10.9 (L) 07/11/2024    HCT 35.3 (L) 07/11/2024    MCV 66 (L) 07/11/2024     (L) 07/11/2024    @RESUFAST(BMP,CBC,BNP,TSH,  INR)@      28 minutes spent reviewing prior records (including documentation, laboratory studies, cardiac testing/imaging), history and physical exam, planning, and subsequent documentation.     The longitudinal plan of care for the condition(s) below were addressed during this visit. Due to the added complexity in care, I will continue to support Shiraz in the subsequent management of this condition(s) and with the ongoing continuity of care of this condition(s):  atrial fibrillation  and congestive heart failure     This note has been dictated using voice recognition software. Any grammatical, typographical, or context distortions are unintentional and inherent to the software.    Melissa Cevallos PA-C, RD  General Cardiology

## 2024-09-03 NOTE — LETTER
9/3/2024    Dany Rodriguez MD, MD  [address]  [place] [address]    RE: Shiraz Ingram       Dear Colleague,     I had the pleasure of seeing Shiraz Ingram in the [facility].          HEART CARE FOLLOW UP    Primary Care: Dany Rodriguez MD  Primary Cardiologist: Dr Field      Assessment/Recommendations   Congestive heart failure  Heart failure with midrange and now improved ejection fraction  Diastolic heart failure  Moderate pulmonary hypertension  Most recent echocardiogram in February of this year showed an ejection fraction of 55 to 60%.  The RV is mildly dilated with moderately reduced function.  He is doing very well and stable from when we met 3 months ago. Weight stable, at home it varies between 140-144lbs. Renal function stable. He does appear euvolemic. Functional capacity excellent for his age.   Continue hydralazine 25 mg 3 times daily  Continue Imdur 60 mg daily  Continue Toprol tartrate 25 mg twice a day  Continue torsemide 10 mg in the morning and 5 mg in the afternoon  Compliance with CPAP is essential   Status post TAVR, most recent echocardiogram in February of this year shows Medtronic valve in the aortic position and a normal gradient.  Repeat TTE in February 2025  Atrial Fibrillation, most recent EKG preformed 2/2024 showed atrial fibrillation with slow ventricular response. The patient is currently on rate control therapy. Most recent device interrogation reviewed.   Continue metoprolol  Educated to watch closely for dizziness and exertional limitations  Pacing 64%  Continue pradaza for anticoagulation, no evidence of abnormal bleeding or bruising  Hypertension, blood pressure above goal on current regimen upon arrival but improved at the end of the visit. Reports medication compliance.   Continue regimen as is, Imdur recently increased   Does not have home BP cuff        Return to care in  4 months  with me.      History of Present  Illness/Subjective    Shiraz Ingram is a 89 year old male with past medical history significant for:  Chronic biventricular heart failure  Heart failure with midrange ejection fraction  Hypertension  Severe aortic stenosis status post TAVR  Status post 29 mm Medtronic implanted in July 2020, mean gradient around 9 mmHg  Atrial fibrillation, chronic  History of MAYA thrombus on anticoagulation  Coronary artery disease  Status post coronary artery bypass grafting with LIMA to the LAD, vein graft to the OM and the RCA  Complete Heart Block s/p PPPM  Complication of TAVR  Hyperlipidemia  CKD stage III  Obstructive sleep apnea  Small bowel obstruction, January 2023    Late last year and earlier this year he was frequently admitted with episodes of heart failure.  Most recent hospitalization was in February of this year.  I saw him on May 20 and he was doing mostly well.  He continue to work a pulsing furniture and lived in the same house he lived in for 50 years.  His 2 sons lived with him and helped him with household work.  He can walk around the block without major limitations but if he does not use his cane he would have dyspnea.  He had rare dizziness and never had presyncope or syncope.  At that visit his medications were continued the same and we reviewed some heart failure education.  Since I last saw him he is not have any major medical illnesses or hospitalizations in our system.  He was seen on July 11 for preop prior to cataract surgery.  On August 5 he was seen by family medicine for his asthma and heart failure.  It was noticed blood pressure was slightly elevated.    Today the patient tells me he's doing well, no concerns. He continues to work in furniture apolstery, do light housework and walk around the block. If his breathing is good, he doesn't take breaks. On a good day he can walk a little further. Has very rare dizziness/lightheadedness with position change, but never pre-syncope or falls. No   "palpitations or racing heart. Sleep well, frequent urination, one pillow without orthopnea or pND. Mild swelling in his legs, worse after a day of work. Elevates his legs at night and this always helps. No cramping. No claudication symptoms. No blood in stool or urine. No fevers, chills or cough.          Data Review Today:   TTE February 8, 2024:  The visual ejection fraction is 55-60%.  Left ventricular systolic function is normal.  The right ventricle is mildly dilated.  The right ventricular systolic function is moderately reduced.  29mm Medtronic Evolut Pro TAVR valve in the aortic position.,  The gradient is normal for this prosthetic aortic valve.  Right ventricular systolic pressure is elevated, consistent with moderate  pulmonary hypertension.  The rhythm was atrial fibrillation.  The study was technically difficult. The study was technically limited.     Echocardiogram June 20, 2022:  Left ventricular systolic function is mildly reduced.  The visual ejection fraction is 45-50%.  There is a bioprosthetic aortic valve. 29mm Medtronic Evolut Pro, implanted  7/2020  The mean AoV pressure gradient is 9mmHg.  Right ventricular systolic pressure is elevated, consistent with mild  pulmonary hypertension.  Compared to 6/21, mean gradients across bioprosthetic aortic valve is  unchanged. EF is likley unchanged. The study was technically difficult               I have reviewed and updated the patient's past medical history, allergy list and medication list.          Physical Examination   Vitals: BP (!) 142/73   Pulse 53   Ht 1.651 m (5' 5\")   SpO2 96%   BMI 23.46 kg/m      BMI= Body mass index is 23.46 kg/m .    Wt Readings from Last 3 Encounters:   08/05/24 64 kg (141 lb)   07/11/24 68.9 kg (152 lb)   05/20/24 64 kg (141 lb)       General :   Alert and oriented, in no acute distress.    HEENT:  Normocephalic and atraumatic. .    Neck: No JVP, carotid bruit or obvious thyromegaly.   Lungs:   Respirations " unlabored. Clear bilaterally with no rales, rhonchi, or wheezes.     Cardiovascular:   Rhythm is regular. S1 and S2 are normal. Systolic ejection murmur is present. Lower extremities demonstrate 1+ edema.        Skin: Skin is warm, dry, and otherwise intact.   Neurologic: Gait not assessed. Mood and affect appropriate.           Medical History  Surgical History Family History Social History   Past Medical History:   Diagnosis Date     Allergic rhinitis, cause unspecified      Anemia, unspecified      Aortic stenosis S/P TAVR      Benign neoplasm of colon 1999    Ademonatous polyp     CHF 2nd to TAVR -- S/P Pacemaker 12/2020     CKD (chronic kidney disease) stage 3, GFR 30-59 ml/min (H) 05/12/2011     Community acquired pneumonia 09/29/2020     Coronary atherosclerosis of unspecified type of vessel, native or graft     s/p CABG 2002     Esophageal ulcer w UGI bleed 05/24/2020    Had EGD adn caurtery 5/24/20, colonoscopy with diverticulosis then     Hyperlipidemia LDL goal < 100      Hypertension goal BP (blood pressure) < 140/90      Localized osteoarthrosis not specified whether primary or secondary, lower leg     left knee     Mild persistent asthma without complication 01/11/2016     Moderate persistent asthma      JOSÉ ANTONIO (obstructive sleep apnea)      Persistent atrial fibrillation (H)      Unspecified hearing loss     Past Surgical History:   Procedure Laterality Date     CARDIAC SURGERY       COLONOSCOPY N/A 5/26/2020    Procedure: COLONOSCOPY;  Surgeon: Ignacio Fournier MD;  Location:  GI     CV ANGIOGRAM CORONARY GRAFT N/A 4/24/2020    Procedure: Angiogram Coronary Graft;  Surgeon: Addison Willard MD;  Location: [facility] CARDIAC CATH LAB     CV CORONARY ANGIOGRAM N/A 4/24/2020    Procedure: Coronary Angiogram;  Surgeon: Addison Willard MD;  Location:  HEART CARDIAC CATH LAB     CV RIGHT HEART CATH MEASUREMENTS RECORDED N/A 4/24/2020    Procedure: Right Heart Cath;  Surgeon: Sudheer  Addison Haddad MD;  Location:  HEART CARDIAC CATH LAB     CV TRANSCATHETER AORTIC VALVE REPLACEMENT N/A 2020    Procedure: Transcatheter Aortic Valve Replacement;  Surgeon: Soraida Monet MD;  Location:  HEART CARDIAC CATH LAB     EP PACEMAKER N/A 7/15/2020    Procedure: EP Pacemaker;  Surgeon: Tommie Sauer MD;  Location:  HEART CARDIAC CATH LAB     HC ESOPHAGOSCOPY, DIAGNOSTIC      Dr. Dow, lower esophageal ring & mild gastritis     HERNIORRHAPHY INGUINAL Right 2016    Procedure: HERNIORRHAPHY INGUINAL;  Surgeon: Alexis Alexandra MD;  Location:  SD     HERNIORRHAPHY INGUINAL Left 2023    Procedure: LEFT GROIN EXPLORATION, SEGMENTAL SMALL BOWEL RESECTION, TRANSINGUINAL REPAIR OF INCARCERATED/STRANGULATED HERNIA;  Surgeon: Dwayne Russell MD;  Location:  OR     LAPAROSCOPIC CHOLECYSTECTOMY      for biliary sludge     REVERSE ARTHROPLASTY SHOULDER Right 2022    Procedure: RIGHT REVERSE SHOULDER ARTHROPLASTY, WITH OPEN REDUCTION INTERNAL FIXATION, WITH NO CUSTOM GUIDE;  Surgeon: Wiley Vargas MD;  Location:  OR     ZZC NONSPECIFIC PROCEDURE      Coronary artery bypass     ZZHC COLONOSCOPY THRU STOMA W BIOPSY/CAUTERY TUMOR/POLYP/LESION      Dr. Dow, Q 5 years     ZZHC COLONOSCOPY THRU STOMA W BIOPSY/CAUTERY TUMOR/POLYP/LESION      Dr. Dow, one adenomatous polyp    Family History   Problem Relation Age of Onset     C.A.D. Father      Cancer Mother         breast cancer,  of pneumonia     Cerebrovascular Disease Son      CABG Son      Family History Negative Child      Family History Negative Sister      Heart Disease Brother     Social History     Socioeconomic History     Marital status:      Spouse name: Kailey     Number of children: 3     Years of education: Not on file     Highest education level: Not on file   Occupational History     Occupation: [employer]     Employer: RETIRED   Tobacco Use     Smoking  status: Never     Passive exposure: Never     Smokeless tobacco: Never     Tobacco comments:     smoked for a week in 20's   Vaping Use     Vaping status: Never Used   Substance and Sexual Activity     Alcohol use: Not Currently     Alcohol/week: 0.0 standard drinks of alcohol     Drug use: No     Sexual activity: Yes     Partners: Female   Other Topics Concern      Service No     Blood Transfusions No     Caffeine Concern Not Asked     Occupational Exposure Not Asked     Hobby Hazards Not Asked     Sleep Concern Not Asked     Stress Concern Not Asked     Weight Concern Not Asked     Special Diet Not Asked     Back Care Not Asked     Exercise Yes     Bike Helmet Not Asked     Seat Belt Yes     Self-Exams No     Parent/sibling w/ CABG, MI or angioplasty before 65F 55M? Yes   Social History Narrative    Balanced Diet - Yes    Osteoporosis Preventative measures-  Dairy servings per day: 2 to 3 servings daily    Regular Exercise -  Yes Describe walks 1.5 mile daily     Dental Exam up - YES - Date: 2 years ago    Eye Exam - YES - Date: 1 year ago    Self Testicular Exam -  No, handout given    Do you have any concerns about STD's -  No    Abuse: Current or Past (Physical, Sexual or Emotional)- No    Do you feel safe in your environment - Yes    Guns stored in the home - Yes, locked    Sunscreen used - No    Seatbelts used - Yes    Lipids - YES - Date: 3-09    Glucose -  YES - Date: 3-09    Colon Cancer Screening - Colonoscopy 3-08(date completed)    Hemoccults - UNKNOWN    PSA - YES - Date: 11-07    Digital Rectal Exam - UNKNOWN    Immunizations reviewed and up to date - Yes, td 1-2005, had shingles vaccine    [facility]                     Social Determinants of Health     Financial Resource Strain: Low Risk  (12/27/2023)    Financial Resource Strain      Within the past 12 months, have you or your family members you live with been unable to get utilities (heat, electricity) when it was really  needed?: No   Food Insecurity: Low Risk  (12/27/2023)    Food Insecurity      Within the past 12 months, did you worry that your food would run out before you got money to buy more?: No      Within the past 12 months, did the food you bought just not last and you didn t have money to get more?: No   Transportation Needs: Low Risk  (12/27/2023)    Transportation Needs      Within the past 12 months, has lack of transportation kept you from medical appointments, getting your medicines, non-medical meetings or appointments, work, or from getting things that you need?: No   Physical Activity: Insufficiently Active (10/28/2020)    Exercise Vital Sign      Days of Exercise per Week: 4 days      Minutes of Exercise per Session: 30 min   Stress: Not on file   Social Connections: Unknown (3/19/2021)    Social Connection and Isolation Panel [NHANES]      Frequency of Communication with Friends and Family: More than three times a week      Frequency of Social Gatherings with Friends and Family: Not on file      Attends Scientology Services: Not on file      Active Member of Clubs or Organizations: Not on file      Attends Club or Organization Meetings: Not on file      Marital Status:    Interpersonal Safety: Low Risk  (7/11/2024)    Interpersonal Safety      Do you feel physically and emotionally safe where you currently live?: Yes      Within the past 12 months, have you been hit, slapped, kicked or otherwise physically hurt by someone?: No      Within the past 12 months, have you been humiliated or emotionally abused in other ways by your partner or ex-partner?: No   Housing Stability: Low Risk  (12/27/2023)    Housing Stability      Do you have housing? : Yes      Are you worried about losing your housing?: No   Recent Concern: Housing Stability - High Risk (11/1/2023)    Housing Stability      Do you have housing? : No      Are you worried about losing your housing?: No          Medications  Allergies   Scheduled  Meds:  Current Outpatient Medications   Medication Sig Dispense Refill     acetaminophen (TYLENOL) 500 MG tablet Take 1,000 mg by mouth every 8 hours as needed       albuterol (PROAIR HFA/PROVENTIL HFA/VENTOLIN HFA) 108 (90 Base) MCG/ACT inhaler INHALE 1 TO 2 PUFFS BY MOUTH EVERY 4 TO 6 HOURS AS NEEDED 18 g 2     aspirin (ASA) 81 MG EC tablet Take 81 mg by mouth 2 times daily 0700 1700       budesonide (PULMICORT) 0.5 MG/2ML neb solution Take 2 mLs (0.5 mg) by nebulization 2 times daily 120 mL 3     calcium carbonate 600 mg-vitamin D 400 units (CALTRATE) 600-400 MG-UNIT per tablet Take 1 tablet by mouth daily       dabigatran ANTICOAGULANT (PRADAXA ANTICOAGULANT) 75 MG capsule TAKE 1 CAPSULE BY MOUTH 2 TIMES DAILY. 60 capsule 11     hydrALAZINE (APRESOLINE) 25 MG tablet Take 1 tablet (25 mg) by mouth 3 times daily 270 tablet 3     ipratropium - albuterol 0.5 mg/2.5 mg/3 mL (DUONEB) 0.5-2.5 (3) MG/3ML neb solution Inhale 1 vial (3 mLs) into the lungs 4 times daily 360 mL 11     isosorbide mononitrate (IMDUR) 60 MG 24 hr tablet Take 1 tablet (60 mg) by mouth daily Can take 2 pills of home supply of 30 mg tabs to equal 60 mg daily to finish existing home supply. 90 tablet 3     magnesium chloride (MAG64) 535 (64 Mg) MG TBEC CR tablet Take 1 tablet (535 mg) by mouth 2 times daily 180 tablet 3     metoprolol tartrate (LOPRESSOR) 25 MG tablet Take 1 tablet (25 mg) by mouth 2 times daily 180 tablet 3     montelukast (SINGULAIR) 10 MG tablet Take 1 tablet (10 mg) by mouth daily 90 tablet 1     multivitamin, therapeutic (THERA-VIT) TABS tablet Take 1 tablet by mouth 2 times daily       pantoprazole (PROTONIX) 40 MG EC tablet TAKE 1 TABLET BEFORE MEALS TWICE A DAY Strength: 40 mg 180 tablet 1     simvastatin (ZOCOR) 40 MG tablet Take 1 tablet (40 mg) by mouth at bedtime 90 tablet 3     torsemide (DEMADEX) 5 MG tablet Take 10 mg every morning and 5 mg every afternoon 270 tablet 3     nitroGLYcerin (NITROSTAT) 0.4 MG sublingual  tablet For chest pain place 1 tablet under the tongue every 5 minutes for 3 doses. If symptoms persist 5 minutes after 1st dose call 911. (Patient not taking: Reported on 9/3/2024) 12 tablet 0     triamcinolone (KENALOG) 0.1 % external lotion Apply topically 2 times daily 60 mL 1    No Known Allergies      Lab Results    Chemistry/lipid CBC Cardiac Enzymes/BNP/TSH/INR   Lab Results   Component Value Date    CHOL 126 11/01/2023    HDL 43 11/01/2023    TRIG 98 11/01/2023    BUN 68.4 (H) 05/17/2024     07/11/2024    CO2 25 05/17/2024    Lab Results   Component Value Date    WBC 4.3 07/11/2024    HGB 10.9 (L) 07/11/2024    HCT 35.3 (L) 07/11/2024    MCV 66 (L) 07/11/2024     (L) 07/11/2024    @RESUFAST(BMP,CBC,BNP,TSH,  INR)@      28 minutes spent reviewing prior records (including documentation, laboratory studies, cardiac testing/imaging), history and physical exam, planning, and subsequent documentation.     The longitudinal plan of care for the condition(s) below were addressed during this visit. Due to the added complexity in care, I will continue to support Shiraz in the subsequent management of this condition(s) and with the ongoing continuity of care of this condition(s):  atrial fibrillation  and congestive heart failure     This note has been dictated using voice recognition software. Any grammatical, typographical, or context distortions are unintentional and inherent to the software.    Melissa Cevallos PA-C, RD  General Cardiology           Thank you for allowing me to participate in the care of your patient.      Sincerely,     Melissa Cevallos PA-C     [facility]  cc:   Melissa Cevallos PA-C  [facility] [place] [address]

## 2024-09-19 ENCOUNTER — ANCILLARY PROCEDURE (OUTPATIENT)
Dept: CARDIOLOGY | Facility: CLINIC | Age: 89
End: 2024-09-19
Attending: INTERNAL MEDICINE
Payer: COMMERCIAL

## 2024-09-19 DIAGNOSIS — Z95.0 CARDIAC PACEMAKER IN SITU: Primary | ICD-10-CM

## 2024-09-19 DIAGNOSIS — I49.5 SSS (SICK SINUS SYNDROME) (H): ICD-10-CM

## 2024-09-19 DIAGNOSIS — Z95.0 CARDIAC PACEMAKER IN SITU: ICD-10-CM

## 2024-09-19 LAB
MDC_IDC_LEAD_CONNECTION_STATUS: NORMAL
MDC_IDC_LEAD_IMPLANT_DT: NORMAL
MDC_IDC_LEAD_LOCATION: NORMAL
MDC_IDC_LEAD_LOCATION_DETAIL_1: NORMAL
MDC_IDC_LEAD_MFG: NORMAL
MDC_IDC_LEAD_MODEL: NORMAL
MDC_IDC_LEAD_POLARITY_TYPE: NORMAL
MDC_IDC_LEAD_SERIAL: NORMAL
MDC_IDC_MSMT_BATTERY_DTM: NORMAL
MDC_IDC_MSMT_BATTERY_REMAINING_LONGEVITY: 131 MO
MDC_IDC_MSMT_BATTERY_RRT_TRIGGER: 2.62
MDC_IDC_MSMT_BATTERY_STATUS: NORMAL
MDC_IDC_MSMT_BATTERY_VOLTAGE: 3.03 V
MDC_IDC_MSMT_LEADCHNL_RV_IMPEDANCE_VALUE: 304 OHM
MDC_IDC_MSMT_LEADCHNL_RV_IMPEDANCE_VALUE: 380 OHM
MDC_IDC_MSMT_LEADCHNL_RV_PACING_THRESHOLD_AMPLITUDE: 0.62 V
MDC_IDC_MSMT_LEADCHNL_RV_PACING_THRESHOLD_PULSEWIDTH: 0.4 MS
MDC_IDC_MSMT_LEADCHNL_RV_SENSING_INTR_AMPL: 8.62 MV
MDC_IDC_MSMT_LEADCHNL_RV_SENSING_INTR_AMPL: 8.62 MV
MDC_IDC_PG_IMPLANT_DTM: NORMAL
MDC_IDC_PG_MFG: NORMAL
MDC_IDC_PG_MODEL: NORMAL
MDC_IDC_PG_SERIAL: NORMAL
MDC_IDC_PG_TYPE: NORMAL
MDC_IDC_SESS_CLINIC_NAME: NORMAL
MDC_IDC_SESS_DTM: NORMAL
MDC_IDC_SESS_TYPE: NORMAL
MDC_IDC_SET_BRADY_HYSTRATE: NORMAL
MDC_IDC_SET_BRADY_LOWRATE: 50 {BEATS}/MIN
MDC_IDC_SET_BRADY_MODE: NORMAL
MDC_IDC_SET_LEADCHNL_RV_PACING_AMPLITUDE: 2 V
MDC_IDC_SET_LEADCHNL_RV_PACING_ANODE_ELECTRODE_1: NORMAL
MDC_IDC_SET_LEADCHNL_RV_PACING_ANODE_LOCATION_1: NORMAL
MDC_IDC_SET_LEADCHNL_RV_PACING_CAPTURE_MODE: NORMAL
MDC_IDC_SET_LEADCHNL_RV_PACING_CATHODE_ELECTRODE_1: NORMAL
MDC_IDC_SET_LEADCHNL_RV_PACING_CATHODE_LOCATION_1: NORMAL
MDC_IDC_SET_LEADCHNL_RV_PACING_POLARITY: NORMAL
MDC_IDC_SET_LEADCHNL_RV_PACING_PULSEWIDTH: 0.4 MS
MDC_IDC_SET_LEADCHNL_RV_SENSING_ANODE_ELECTRODE_1: NORMAL
MDC_IDC_SET_LEADCHNL_RV_SENSING_ANODE_LOCATION_1: NORMAL
MDC_IDC_SET_LEADCHNL_RV_SENSING_CATHODE_ELECTRODE_1: NORMAL
MDC_IDC_SET_LEADCHNL_RV_SENSING_CATHODE_LOCATION_1: NORMAL
MDC_IDC_SET_LEADCHNL_RV_SENSING_POLARITY: NORMAL
MDC_IDC_SET_LEADCHNL_RV_SENSING_SENSITIVITY: 0.9 MV
MDC_IDC_SET_ZONE_DETECTION_INTERVAL: 360 MS
MDC_IDC_SET_ZONE_STATUS: NORMAL
MDC_IDC_SET_ZONE_TYPE: NORMAL
MDC_IDC_SET_ZONE_VENDOR_TYPE: NORMAL
MDC_IDC_STAT_BRADY_DTM_END: NORMAL
MDC_IDC_STAT_BRADY_DTM_START: NORMAL
MDC_IDC_STAT_BRADY_RV_PERCENT_PACED: 73.68 %
MDC_IDC_STAT_EPISODE_RECENT_COUNT: 0
MDC_IDC_STAT_EPISODE_RECENT_COUNT_DTM_END: NORMAL
MDC_IDC_STAT_EPISODE_RECENT_COUNT_DTM_START: NORMAL
MDC_IDC_STAT_EPISODE_TOTAL_COUNT: 0
MDC_IDC_STAT_EPISODE_TOTAL_COUNT: 0
MDC_IDC_STAT_EPISODE_TOTAL_COUNT: 6
MDC_IDC_STAT_EPISODE_TOTAL_COUNT_DTM_END: NORMAL
MDC_IDC_STAT_EPISODE_TOTAL_COUNT_DTM_START: NORMAL
MDC_IDC_STAT_EPISODE_TYPE: NORMAL

## 2024-09-19 PROCEDURE — 93294 REM INTERROG EVL PM/LDLS PM: CPT | Performed by: INTERNAL MEDICINE

## 2024-09-19 PROCEDURE — 93296 REM INTERROG EVL PM/IDS: CPT | Performed by: INTERNAL MEDICINE

## 2024-09-27 NOTE — PROGRESS NOTES
Goal Outcome Evaluation:  Plan of Care Reviewed With: patient        Progress: improving  Outcome Evaluation: Continued treatment for pulmonary embolism. Now on PO eliquis. Continues to have bilateral knee pain, left worse than right. Steroid injection given in left knee today and PRN norco continued. Worked with PT today. Family member at bedside - supportive.                                RECEIVING UNIT ED HANDOFF REVIEW    ED Nurse Handoff Report was reviewed by: Nayely Mccullough RN on July 27, 2020 at 11:43 PM

## 2024-10-15 ENCOUNTER — IMMUNIZATION (OUTPATIENT)
Dept: FAMILY MEDICINE | Facility: CLINIC | Age: 89
End: 2024-10-15
Payer: COMMERCIAL

## 2024-10-15 ENCOUNTER — TELEPHONE (OUTPATIENT)
Dept: FAMILY MEDICINE | Facility: CLINIC | Age: 89
End: 2024-10-15

## 2024-10-15 PROCEDURE — 91320 SARSCV2 VAC 30MCG TRS-SUC IM: CPT

## 2024-10-15 PROCEDURE — G0008 ADMIN INFLUENZA VIRUS VAC: HCPCS

## 2024-10-15 PROCEDURE — 90662 IIV NO PRSV INCREASED AG IM: CPT

## 2024-10-15 PROCEDURE — 90480 ADMN SARSCOV2 VAC 1/ONLY CMP: CPT

## 2024-10-15 NOTE — TELEPHONE ENCOUNTER
Dabigatran is generic name for pradaxa and it is not a different drug. Unfortunately for most plans we have to wait until next year for clarification.   Continue same rx and will adjust rx pending insurance issue next year.

## 2024-10-15 NOTE — TELEPHONE ENCOUNTER
Patient came in clinic today for a flu and Covid vaccine. Patient had a medication question. He received a notification in the mail stating that Pradaxa will not be covered by his insurance starting January 1, 2025. On the letter it stated that an alternative option can be Dabigatran. Patient is wondering what alternative medication he can have. Please advise and let patient know.     Yudith Bojorquez LPN

## 2024-10-18 NOTE — TELEPHONE ENCOUNTER
Pt called with   Dr Rodriguez's message relayed to pt  PT given opportunity to ask questions and he said no questions    Roxie NELSON RN, BSN  [facility]

## 2024-11-06 NOTE — TELEPHONE ENCOUNTER
Prior Authorization Not Needed per Insurance       Medication: DABIGATRAN ETEXILATE MESYLATE 75 MG PO CAPS  Insurance Company: [employer] - Phone [phone] Fax [phone]  Expected CoPay: $    Pharmacy Filling the Rx: [id number] - [place], [place] - [address]  Pharmacy Notified: Yes  Patient Notified: Pharmacy will notify patient.       Detail Level: Detailed Send Procedure Quote As Charge: No

## 2024-11-19 DIAGNOSIS — J45.40 MODERATE PERSISTENT ASTHMA WITHOUT COMPLICATION: ICD-10-CM

## 2024-11-19 RX ORDER — MONTELUKAST SODIUM 10 MG/1
1 TABLET ORAL DAILY
Qty: 90 TABLET | Refills: 0 | Status: SHIPPED | OUTPATIENT
Start: 2024-11-19

## 2024-12-31 ENCOUNTER — ANCILLARY PROCEDURE (OUTPATIENT)
Dept: CARDIOLOGY | Facility: CLINIC | Age: 89
End: 2024-12-31
Attending: INTERNAL MEDICINE
Payer: COMMERCIAL

## 2024-12-31 DIAGNOSIS — I49.5 SSS (SICK SINUS SYNDROME) (H): ICD-10-CM

## 2024-12-31 DIAGNOSIS — Z95.0 CARDIAC PACEMAKER IN SITU: ICD-10-CM

## 2024-12-31 LAB
MDC_IDC_LEAD_CONNECTION_STATUS: NORMAL
MDC_IDC_LEAD_IMPLANT_DT: NORMAL
MDC_IDC_LEAD_LOCATION: NORMAL
MDC_IDC_LEAD_LOCATION_DETAIL_1: NORMAL
MDC_IDC_LEAD_MFG: NORMAL
MDC_IDC_LEAD_MODEL: NORMAL
MDC_IDC_LEAD_POLARITY_TYPE: NORMAL
MDC_IDC_LEAD_SERIAL: NORMAL
MDC_IDC_MSMT_BATTERY_DTM: NORMAL
MDC_IDC_MSMT_BATTERY_REMAINING_LONGEVITY: 125 MO
MDC_IDC_MSMT_BATTERY_RRT_TRIGGER: 2.62
MDC_IDC_MSMT_BATTERY_STATUS: NORMAL
MDC_IDC_MSMT_BATTERY_VOLTAGE: 3.03 V
MDC_IDC_MSMT_LEADCHNL_RV_IMPEDANCE_VALUE: 285 OHM
MDC_IDC_MSMT_LEADCHNL_RV_IMPEDANCE_VALUE: 323 OHM
MDC_IDC_MSMT_LEADCHNL_RV_PACING_THRESHOLD_AMPLITUDE: 0.75 V
MDC_IDC_MSMT_LEADCHNL_RV_PACING_THRESHOLD_PULSEWIDTH: 0.4 MS
MDC_IDC_MSMT_LEADCHNL_RV_SENSING_INTR_AMPL: 7.88 MV
MDC_IDC_MSMT_LEADCHNL_RV_SENSING_INTR_AMPL: 7.88 MV
MDC_IDC_PG_IMPLANT_DTM: NORMAL
MDC_IDC_PG_MFG: NORMAL
MDC_IDC_PG_MODEL: NORMAL
MDC_IDC_PG_SERIAL: NORMAL
MDC_IDC_PG_TYPE: NORMAL
MDC_IDC_SESS_CLINIC_NAME: NORMAL
MDC_IDC_SESS_DTM: NORMAL
MDC_IDC_SESS_TYPE: NORMAL
MDC_IDC_SET_BRADY_HYSTRATE: NORMAL
MDC_IDC_SET_BRADY_LOWRATE: 50 {BEATS}/MIN
MDC_IDC_SET_BRADY_MODE: NORMAL
MDC_IDC_SET_LEADCHNL_RV_PACING_AMPLITUDE: 2 V
MDC_IDC_SET_LEADCHNL_RV_PACING_ANODE_ELECTRODE_1: NORMAL
MDC_IDC_SET_LEADCHNL_RV_PACING_ANODE_LOCATION_1: NORMAL
MDC_IDC_SET_LEADCHNL_RV_PACING_CAPTURE_MODE: NORMAL
MDC_IDC_SET_LEADCHNL_RV_PACING_CATHODE_ELECTRODE_1: NORMAL
MDC_IDC_SET_LEADCHNL_RV_PACING_CATHODE_LOCATION_1: NORMAL
MDC_IDC_SET_LEADCHNL_RV_PACING_POLARITY: NORMAL
MDC_IDC_SET_LEADCHNL_RV_PACING_PULSEWIDTH: 0.4 MS
MDC_IDC_SET_LEADCHNL_RV_SENSING_ANODE_ELECTRODE_1: NORMAL
MDC_IDC_SET_LEADCHNL_RV_SENSING_ANODE_LOCATION_1: NORMAL
MDC_IDC_SET_LEADCHNL_RV_SENSING_CATHODE_ELECTRODE_1: NORMAL
MDC_IDC_SET_LEADCHNL_RV_SENSING_CATHODE_LOCATION_1: NORMAL
MDC_IDC_SET_LEADCHNL_RV_SENSING_POLARITY: NORMAL
MDC_IDC_SET_LEADCHNL_RV_SENSING_SENSITIVITY: 0.9 MV
MDC_IDC_SET_ZONE_DETECTION_INTERVAL: 360 MS
MDC_IDC_SET_ZONE_STATUS: NORMAL
MDC_IDC_SET_ZONE_TYPE: NORMAL
MDC_IDC_SET_ZONE_VENDOR_TYPE: NORMAL
MDC_IDC_STAT_BRADY_DTM_END: NORMAL
MDC_IDC_STAT_BRADY_DTM_START: NORMAL
MDC_IDC_STAT_BRADY_RV_PERCENT_PACED: 80.22 %
MDC_IDC_STAT_EPISODE_RECENT_COUNT: 0
MDC_IDC_STAT_EPISODE_RECENT_COUNT_DTM_END: NORMAL
MDC_IDC_STAT_EPISODE_RECENT_COUNT_DTM_START: NORMAL
MDC_IDC_STAT_EPISODE_TOTAL_COUNT: 0
MDC_IDC_STAT_EPISODE_TOTAL_COUNT: 0
MDC_IDC_STAT_EPISODE_TOTAL_COUNT: 6
MDC_IDC_STAT_EPISODE_TOTAL_COUNT_DTM_END: NORMAL
MDC_IDC_STAT_EPISODE_TOTAL_COUNT_DTM_START: NORMAL
MDC_IDC_STAT_EPISODE_TYPE: NORMAL

## 2024-12-31 PROCEDURE — 93294 REM INTERROG EVL PM/LDLS PM: CPT | Performed by: INTERNAL MEDICINE

## 2024-12-31 PROCEDURE — 93296 REM INTERROG EVL PM/IDS: CPT | Performed by: INTERNAL MEDICINE

## 2025-01-28 ENCOUNTER — OFFICE VISIT (OUTPATIENT)
Dept: CARDIOLOGY | Facility: CLINIC | Age: OVER 89
End: 2025-01-28
Attending: PHYSICIAN ASSISTANT
Payer: COMMERCIAL

## 2025-01-28 VITALS
SYSTOLIC BLOOD PRESSURE: 116 MMHG | HEART RATE: 60 BPM | WEIGHT: 140 LBS | HEIGHT: 65 IN | DIASTOLIC BLOOD PRESSURE: 62 MMHG | BODY MASS INDEX: 23.32 KG/M2 | OXYGEN SATURATION: 93 %

## 2025-01-28 DIAGNOSIS — I50.9 ACUTE ON CHRONIC CONGESTIVE HEART FAILURE, UNSPECIFIED HEART FAILURE TYPE (H): ICD-10-CM

## 2025-01-28 DIAGNOSIS — I44.2 COMPLETE HEART BLOCK (H): ICD-10-CM

## 2025-01-28 DIAGNOSIS — I10 HYPERTENSION GOAL BP (BLOOD PRESSURE) < 140/90: ICD-10-CM

## 2025-01-28 DIAGNOSIS — Z95.2 S/P TAVR (TRANSCATHETER AORTIC VALVE REPLACEMENT): ICD-10-CM

## 2025-01-28 DIAGNOSIS — I50.9 CHRONIC CONGESTIVE HEART FAILURE, UNSPECIFIED HEART FAILURE TYPE (H): Primary | ICD-10-CM

## 2025-01-28 DIAGNOSIS — I50.31 ACUTE DIASTOLIC HEART FAILURE (H): ICD-10-CM

## 2025-01-28 DIAGNOSIS — E78.5 HYPERLIPIDEMIA WITH TARGET LDL LESS THAN 100: ICD-10-CM

## 2025-01-28 PROCEDURE — 99214 OFFICE O/P EST MOD 30 MIN: CPT | Performed by: PHYSICIAN ASSISTANT

## 2025-01-28 RX ORDER — ISOSORBIDE MONONITRATE 60 MG/1
60 TABLET, EXTENDED RELEASE ORAL DAILY
Qty: 90 TABLET | Refills: 3 | Status: SHIPPED | OUTPATIENT
Start: 2025-01-28

## 2025-01-28 RX ORDER — SIMVASTATIN 40 MG
40 TABLET ORAL AT BEDTIME
Qty: 90 TABLET | Refills: 3 | Status: SHIPPED | OUTPATIENT
Start: 2025-01-28

## 2025-01-28 RX ORDER — METOPROLOL TARTRATE 25 MG/1
25 TABLET, FILM COATED ORAL 2 TIMES DAILY
Qty: 180 TABLET | Refills: 3 | Status: SHIPPED | OUTPATIENT
Start: 2025-01-28

## 2025-01-28 RX ORDER — TORSEMIDE 5 MG/1
TABLET ORAL
Qty: 270 TABLET | Refills: 3 | Status: SHIPPED | OUTPATIENT
Start: 2025-01-28

## 2025-01-28 RX ORDER — HYDRALAZINE HYDROCHLORIDE 25 MG/1
25 TABLET, FILM COATED ORAL 3 TIMES DAILY
Qty: 270 TABLET | Refills: 3 | Status: SHIPPED | OUTPATIENT
Start: 2025-01-28

## 2025-01-28 NOTE — LETTER
1/28/2025    Dany Rodriguez MD, MD  [address]  [place] [address]    RE: Shiraz Ingram       Dear Colleague,     I had the pleasure of seeing Shiraz Ingram in the [facility].          HEART CARE FOLLOW UP    Primary Care: Dany Rodriguez MD  Primary Cardiologist: Dr Field      Assessment/Recommendations   Congestive heart failure  Heart failure with midrange and now improved ejection fraction  Diastolic heart failure  Moderate pulmonary hypertension  Most recent echocardiogram in February 2024 ejection fraction of 55 to 60%.  The RV is mildly dilated with moderately reduced function.  He is doing very well and stable compared to fall 2024.  Weight stable, at home it varies between 140-144lbs, similar to last visit. Renal function stable. He does appear euvolemic. Functional capacity excellent for his age.   Continue hydralazine 25 mg 3 times daily  Continue Imdur 60 mg daily  Continue Toprol tartrate 25 mg twice a day  Continue torsemide 10 mg in the morning and 5 mg in the afternoon  Compliance with nighttime oxygen is essential   Status post TAVR, most recent echocardiogram in February of this year shows Medtronic valve in the aortic position and a normal gradient.  Repeat TTE in February 2025  Atrial Fibrillation, most recent EKG preformed 2/2024 showed atrial fibrillation with slow ventricular response. Device interrogation from December shows underlying AF with HR 30-60 bpm. The patient is currently on rate control therapy.   Continue metoprolol  Educated to watch closely for dizziness and exertional limitations  Pacing 80%, up from previous; slowly uptrending throughout 2024, monitor for RV dysfunction   Continue pradaxa for anticoagulation, no evidence of abnormal bleeding or bruising  Hypertension, blood pressure above goal on current regimen upon arrival but improved at the end of the visit. Reports medication compliance.   Continue regimen as is, Imdur  recently increased   Does not have home BP cuff     Return to care in 3 months with m3.      History of Present Illness/Subjective    Shiraz Ingram is a 89 year old male with past medical history significant for:  Chronic biventricular heart failure  Heart failure with midrange ejection fraction  Hypertension  Severe aortic stenosis status post TAVR  Status post 29 mm Medtronic implanted in July 2020, mean gradient around 9 mmHg  Atrial fibrillation, chronic  History of MAYA thrombus on anticoagulation  Coronary artery disease  Status post coronary artery bypass grafting with LIMA to the LAD, vein graft to the OM and the RCA  Complete Heart Block s/p PPPM  Complication of TAVR  Hyperlipidemia  CKD stage III  Obstructive sleep apnea  Small bowel obstruction, January 2023    The patient was last seen in in clinic in September 2024.  At that time he continued to work in furniture upholstry, do light housework and walk around the block without major limitations.  On a good day he could walk a little farther than around the block.  He had very rare dizziness or lightheadedness with position change but had not had presyncope or falls.  At that visit we continued his medications and and plan for an echocardiogram in February 2025.  Since that visit he is fortunately not had any major medical illnesses or hospitalizations in our system.    Today the patient tells me he's doing well, life is stable. He is wearing the oxygen when he naps and overnight. He's no longer walking around the block due to cold/ice, but is trying to be intentional walking around the house and doing the stairs, up to 3 times per day. Feels well when he's being active, no limitations. Patient denies chest pain, pressure and tightness. No shortness of breath or dyspnea on exertion unless he does the stairs multiple times, this resolves quickly. No dizziness, lightheadedness, pre-syncope or syncope. No palpitations or racing heart. Sleeping well without  orthopnea or PND. Minimal leg swelling-stable. No claudication symptoms. No blood in stool or urine. No fevers, chills or cough.          Data Review Today:     TTE February 8, 2024:  The visual ejection fraction is 55-60%.  Left ventricular systolic function is normal.  The right ventricle is mildly dilated.  The right ventricular systolic function is moderately reduced.  29mm Medtronic Evolut Pro TAVR valve in the aortic position.,  The gradient is normal for this prosthetic aortic valve.  Right ventricular systolic pressure is elevated, consistent with moderate  pulmonary hypertension.  The rhythm was atrial fibrillation.  The study was technically difficult. The study was technically limited.     Echocardiogram June 20, 2022:  Left ventricular systolic function is mildly reduced.  The visual ejection fraction is 45-50%.  There is a bioprosthetic aortic valve. 29mm Medtronic Evolut Pro, implanted  7/2020  The mean AoV pressure gradient is 9mmHg.  Right ventricular systolic pressure is elevated, consistent with mild  pulmonary hypertension.  Compared to 6/21, mean gradients across bioprosthetic aortic valve is  unchanged. EF is likley unchanged. The study was technically difficult          I have reviewed and updated the patient's past medical history, allergy list and medication list.          Physical Examination   Vitals: There were no vitals taken for this visit.    BMI= There is no height or weight on file to calculate BMI.    Wt Readings from Last 3 Encounters:   08/05/24 64 kg (141 lb)   07/11/24 68.9 kg (152 lb)   05/20/24 64 kg (141 lb)       General :   Alert and oriented, in no acute distress.    HEENT:  Normocephalic and atraumatic. .    Neck: No JVP, carotid bruit or obvious thyromegaly.   Lungs:   Respirations unlabored. Clear bilaterally with no rales, rhonchi, or wheezes.     Cardiovascular:   Rhythm is regular. S1 and S2 are normal. No significant murmur is present. Lower extremities demonstrate  1+  edema around ankles       Skin: Skin is warm, dry, and otherwise intact.   Neurologic: Gait not assessed. Mood and affect appropriate.           Medical History  Surgical History Family History Social History   Past Medical History:   Diagnosis Date     Allergic rhinitis, cause unspecified      Anemia, unspecified      Aortic stenosis S/P TAVR      Benign neoplasm of colon 1999    Ademonatous polyp     CHF 2nd to TAVR -- S/P Pacemaker 12/2020     CKD (chronic kidney disease) stage 3, GFR 30-59 ml/min (H) 05/12/2011     Community acquired pneumonia 09/29/2020     Coronary atherosclerosis of unspecified type of vessel, native or graft     s/p CABG 2002     Esophageal ulcer w UGI bleed 05/24/2020    Had EGD adn caurtery 5/24/20, colonoscopy with diverticulosis then     Hyperlipidemia LDL goal < 100      Hypertension goal BP (blood pressure) < 140/90      Localized osteoarthrosis not specified whether primary or secondary, lower leg     left knee     Mild persistent asthma without complication 01/11/2016     Moderate persistent asthma      JOSÉ ANTONIO (obstructive sleep apnea)      Persistent atrial fibrillation (H)      Unspecified hearing loss     Past Surgical History:   Procedure Laterality Date     CARDIAC SURGERY       COLONOSCOPY N/A 5/26/2020    Procedure: COLONOSCOPY;  Surgeon: Ignacio Fournier MD;  Location:  GI     CV ANGIOGRAM CORONARY GRAFT N/A 4/24/2020    Procedure: Angiogram Coronary Graft;  Surgeon: Addison Willard MD;  Location:  HEART CARDIAC CATH LAB     CV CORONARY ANGIOGRAM N/A 4/24/2020    Procedure: Coronary Angiogram;  Surgeon: Addison Willard MD;  Location:  HEART CARDIAC CATH LAB     CV RIGHT HEART CATH MEASUREMENTS RECORDED N/A 4/24/2020    Procedure: Right Heart Cath;  Surgeon: Addison Willard MD;  Location:  HEART CARDIAC CATH LAB     CV TRANSCATHETER AORTIC VALVE REPLACEMENT N/A 7/14/2020    Procedure: Transcatheter Aortic Valve Replacement;  Surgeon: Tierney  Soraida Maravilla MD;  Location:  HEART CARDIAC CATH LAB     EP PACEMAKER N/A 7/15/2020    Procedure: EP Pacemaker;  Surgeon: Tommie Sauer MD;  Location:  HEART CARDIAC CATH LAB     HC ESOPHAGOSCOPY, DIAGNOSTIC      Dr. Dow, lower esophageal ring & mild gastritis     HERNIORRHAPHY INGUINAL Right 2016    Procedure: HERNIORRHAPHY INGUINAL;  Surgeon: Alexis Alexandra MD;  Location:  SD     HERNIORRHAPHY INGUINAL Left 2023    Procedure: LEFT GROIN EXPLORATION, SEGMENTAL SMALL BOWEL RESECTION, TRANSINGUINAL REPAIR OF INCARCERATED/STRANGULATED HERNIA;  Surgeon: Dwayne Russell MD;  Location:  OR     LAPAROSCOPIC CHOLECYSTECTOMY      for biliary sludge     REVERSE ARTHROPLASTY SHOULDER Right 2022    Procedure: RIGHT REVERSE SHOULDER ARTHROPLASTY, WITH OPEN REDUCTION INTERNAL FIXATION, WITH NO CUSTOM GUIDE;  Surgeon: Wiley Vargas MD;  Location:  OR     ZZC NONSPECIFIC PROCEDURE      Coronary artery bypass     ZZHC COLONOSCOPY THRU STOMA W BIOPSY/CAUTERY TUMOR/POLYP/LESION      Dr. Dow, Q 5 years     ZZHC COLONOSCOPY THRU STOMA W BIOPSY/CAUTERY TUMOR/POLYP/LESION      Dr. Dow, one adenomatous polyp    Family History   Problem Relation Age of Onset     C.A.D. Father      Cancer Mother         breast cancer,  of pneumonia     Cerebrovascular Disease Son      CABG Son      Family History Negative Child      Family History Negative Sister      Heart Disease Brother     Social History     Socioeconomic History     Marital status:      Spouse name: Kailey     Number of children: 3     Years of education: Not on file     Highest education level: Not on file   Occupational History     Occupation: [employer]     Employer: RETIRED   Tobacco Use     Smoking status: Never     Passive exposure: Never     Smokeless tobacco: Never     Tobacco comments:     smoked for a week in 20's   Vaping Use     Vaping status: Never Used   Substance and  Sexual Activity     Alcohol use: Not Currently     Alcohol/week: 0.0 standard drinks of alcohol     Drug use: No     Sexual activity: Yes     Partners: Female   Other Topics Concern      Service No     Blood Transfusions No     Caffeine Concern Not Asked     Occupational Exposure Not Asked     Hobby Hazards Not Asked     Sleep Concern Not Asked     Stress Concern Not Asked     Weight Concern Not Asked     Special Diet Not Asked     Back Care Not Asked     Exercise Yes     Bike Helmet Not Asked     Seat Belt Yes     Self-Exams No     Parent/sibling w/ CABG, MI or angioplasty before 65F 55M? Yes   Social History Narrative    Balanced Diet - Yes    Osteoporosis Preventative measures-  Dairy servings per day: 2 to 3 servings daily    Regular Exercise -  Yes Describe walks 1.5 mile daily     Dental Exam up - YES - Date: 2 years ago    Eye Exam - YES - Date: 1 year ago    Self Testicular Exam -  No, handout given    Do you have any concerns about STD's -  No    Abuse: Current or Past (Physical, Sexual or Emotional)- No    Do you feel safe in your environment - Yes    Guns stored in the home - Yes, locked    Sunscreen used - No    Seatbelts used - Yes    Lipids - YES - Date: 3-09    Glucose -  YES - Date: 3-09    Colon Cancer Screening - Colonoscopy 3-08(date completed)    Hemoccults - UNKNOWN    PSA - YES - Date: 11-07    Digital Rectal Exam - UNKNOWN    Immunizations reviewed and up to date - Yes, td 1-2005, had shingles vaccine    5-28-09  JANEY Patel [facility]                     Social Drivers of Health     Financial Resource Strain: Low Risk  (12/27/2023)    Financial Resource Strain      Within the past 12 months, have you or your family members you live with been unable to get utilities (heat, electricity) when it was really needed?: No   Food Insecurity: Low Risk  (12/27/2023)    Food Insecurity      Within the past 12 months, did you worry that your food would run out before you got money to buy more?: No       Within the past 12 months, did the food you bought just not last and you didn t have money to get more?: No   Transportation Needs: Low Risk  (12/27/2023)    Transportation Needs      Within the past 12 months, has lack of transportation kept you from medical appointments, getting your medicines, non-medical meetings or appointments, work, or from getting things that you need?: No   Physical Activity: Insufficiently Active (10/28/2020)    Exercise Vital Sign      Days of Exercise per Week: 4 days      Minutes of Exercise per Session: 30 min   Stress: Not on file   Social Connections: Unknown (3/19/2021)    Social Connection and Isolation Panel [NHANES]      Frequency of Communication with Friends and Family: More than three times a week      Frequency of Social Gatherings with Friends and Family: Not on file      Attends Yarsanism Services: Not on file      Active Member of Clubs or Organizations: Not on file      Attends Club or Organization Meetings: Not on file      Marital Status:    Interpersonal Safety: Low Risk  (7/11/2024)    Interpersonal Safety      Do you feel physically and emotionally safe where you currently live?: Yes      Within the past 12 months, have you been hit, slapped, kicked or otherwise physically hurt by someone?: No      Within the past 12 months, have you been humiliated or emotionally abused in other ways by your partner or ex-partner?: No   Housing Stability: Low Risk  (12/27/2023)    Housing Stability      Do you have housing? : Yes      Are you worried about losing your housing?: No   Recent Concern: Housing Stability - High Risk (11/1/2023)    Housing Stability      Do you have housing? : No      Are you worried about losing your housing?: No          Medications  Allergies   Scheduled Meds:  Current Outpatient Medications   Medication Sig Dispense Refill     acetaminophen (TYLENOL) 500 MG tablet Take 1,000 mg by mouth every 8 hours as needed       albuterol (PROAIR  HFA/PROVENTIL HFA/VENTOLIN HFA) 108 (90 Base) MCG/ACT inhaler INHALE 1 TO 2 PUFFS BY MOUTH EVERY 4 TO 6 HOURS AS NEEDED 18 g 2     aspirin (ASA) 81 MG EC tablet Take 81 mg by mouth 2 times daily 0700 1700       budesonide (PULMICORT) 0.5 MG/2ML neb solution Take 2 mLs (0.5 mg) by nebulization 2 times daily 120 mL 3     calcium carbonate 600 mg-vitamin D 400 units (CALTRATE) 600-400 MG-UNIT per tablet Take 1 tablet by mouth daily       dabigatran ANTICOAGULANT (PRADAXA ANTICOAGULANT) 75 MG capsule TAKE 1 CAPSULE BY MOUTH 2 TIMES DAILY. 60 capsule 11     hydrALAZINE (APRESOLINE) 25 MG tablet Take 1 tablet (25 mg) by mouth 3 times daily 270 tablet 3     ipratropium - albuterol 0.5 mg/2.5 mg/3 mL (DUONEB) 0.5-2.5 (3) MG/3ML neb solution Inhale 1 vial (3 mLs) into the lungs 4 times daily 360 mL 11     isosorbide mononitrate (IMDUR) 60 MG 24 hr tablet Take 1 tablet (60 mg) by mouth daily Can take 2 pills of home supply of 30 mg tabs to equal 60 mg daily to finish existing home supply. 90 tablet 3     magnesium chloride (MAG64) 535 (64 Mg) MG TBEC CR tablet Take 1 tablet (535 mg) by mouth 2 times daily 180 tablet 3     metoprolol tartrate (LOPRESSOR) 25 MG tablet Take 1 tablet (25 mg) by mouth 2 times daily 180 tablet 3     montelukast (SINGULAIR) 10 MG tablet Take 1 tablet (10 mg) by mouth daily. 90 tablet 0     multivitamin, therapeutic (THERA-VIT) TABS tablet Take 1 tablet by mouth 2 times daily       nitroGLYcerin (NITROSTAT) 0.4 MG sublingual tablet For chest pain place 1 tablet under the tongue every 5 minutes for 3 doses. If symptoms persist 5 minutes after 1st dose call 911. (Patient not taking: Reported on 9/3/2024) 12 tablet 0     pantoprazole (PROTONIX) 40 MG EC tablet TAKE 1 TABLET BEFORE MEALS TWICE A DAY Strength: 40 mg 180 tablet 1     simvastatin (ZOCOR) 40 MG tablet Take 1 tablet (40 mg) by mouth at bedtime 90 tablet 3     torsemide (DEMADEX) 5 MG tablet Take 10 mg every morning and 5 mg every afternoon  270 tablet 3     triamcinolone (KENALOG) 0.1 % external lotion Apply topically 2 times daily 60 mL 1    No Known Allergies      Lab Results    Chemistry/lipid CBC Cardiac Enzymes/BNP/TSH/INR   Lab Results   Component Value Date    CHOL 126 11/01/2023    HDL 43 11/01/2023    TRIG 98 11/01/2023    BUN 68.4 (H) 05/17/2024     07/11/2024    CO2 25 05/17/2024    Lab Results   Component Value Date    WBC 4.3 07/11/2024    HGB 10.9 (L) 07/11/2024    HCT 35.3 (L) 07/11/2024    MCV 66 (L) 07/11/2024     (L) 07/11/2024    @RESUFAST(BMP,CBC,BNP,TSH,  INR)@      28 minutes spent reviewing prior records (including documentation, laboratory studies, cardiac testing/imaging), history and physical exam, planning, and subsequent documentation.     This note has been dictated using voice recognition software. Any grammatical, typographical, or context distortions are unintentional and inherent to the software.    Melissa Cevallos PA-C, RD  General Cardiology           Thank you for allowing me to participate in the care of your patient.      Sincerely,     Melissa Cevallos PA-C     [facility]  cc:   Melissa Cevallos PA-C  [address] [place] [address]

## 2025-01-28 NOTE — PATIENT INSTRUCTIONS
It was great to see you today! Your Cardiology Team includes myself and Dr. Field.     Recommendations from today:  Continue medications the same  Continue to monitor weight, call if >144 lbs  Follow up in 3 months with me.     For scheduling questions, our 24 hours scheduling line is available at [phone].    To reach our nursing team, please call [phone]. Our nursing team is available 8-4:30 Monday-Friday. If you have a medical emergency, please call [phone].

## 2025-01-28 NOTE — PROGRESS NOTES
HEART CARE FOLLOW UP    Primary Care: Dany Rodriguez MD  Primary Cardiologist: Dr Field      Assessment/Recommendations   Congestive heart failure  Heart failure with midrange and now improved ejection fraction  Diastolic heart failure  Moderate pulmonary hypertension  Most recent echocardiogram in February 2024 ejection fraction of 55 to 60%.  The RV is mildly dilated with moderately reduced function.  He is doing very well and stable compared to fall 2024.  Weight stable, at home it varies between 140-144lbs, similar to last visit. Renal function stable. He does appear euvolemic. Functional capacity excellent for his age.   Continue hydralazine 25 mg 3 times daily  Continue Imdur 60 mg daily  Continue Toprol tartrate 25 mg twice a day  Continue torsemide 10 mg in the morning and 5 mg in the afternoon  Compliance with nighttime oxygen is essential   Status post TAVR, most recent echocardiogram in February of this year shows Medtronic valve in the aortic position and a normal gradient.  Repeat TTE in February 2025  Atrial Fibrillation, most recent EKG preformed 2/2024 showed atrial fibrillation with slow ventricular response. Device interrogation from December shows underlying AF with HR 30-60 bpm. The patient is currently on rate control therapy.   Continue metoprolol  Educated to watch closely for dizziness and exertional limitations  Pacing 80%, up from previous; slowly uptrending throughout 2024, monitor for RV dysfunction   Continue pradaxa for anticoagulation, no evidence of abnormal bleeding or bruising  Hypertension, blood pressure above goal on current regimen upon arrival but improved at the end of the visit. Reports medication compliance.   Continue regimen as is, Imdur recently increased   Does not have home BP cuff     Return to care in 3 months with m3.      History of Present Illness/Subjective    Shiraz Ingram is a 89 year old male with past medical history significant for:  Chronic  biventricular heart failure  Heart failure with midrange ejection fraction  Hypertension  Severe aortic stenosis status post TAVR  Status post 29 mm Medtronic implanted in July 2020, mean gradient around 9 mmHg  Atrial fibrillation, chronic  History of MAYA thrombus on anticoagulation  Coronary artery disease  Status post coronary artery bypass grafting with LIMA to the LAD, vein graft to the OM and the RCA  Complete Heart Block s/p PPPM  Complication of TAVR  Hyperlipidemia  CKD stage III  Obstructive sleep apnea  Small bowel obstruction, January 2023    The patient was last seen in in clinic in September 2024.  At that time he continued to work in [employer], do light housework and walk around the block without major limitations.  On a good day he could walk a little farther than around the block.  He had very rare dizziness or lightheadedness with position change but had not had presyncope or falls.  At that visit we continued his medications and and plan for an echocardiogram in February 2025.  Since that visit he is fortunately not had any major medical illnesses or hospitalizations in our system.    Today the patient tells me he's doing well, life is stable. He is wearing the oxygen when he naps and overnight. He's no longer walking around the block due to cold/ice, but is trying to be intentional walking around the house and doing the stairs, up to 3 times per day. Feels well when he's being active, no limitations. Patient denies chest pain, pressure and tightness. No shortness of breath or dyspnea on exertion unless he does the stairs multiple times, this resolves quickly. No dizziness, lightheadedness, pre-syncope or syncope. No palpitations or racing heart. Sleeping well without orthopnea or PND. Minimal leg swelling-stable. No claudication symptoms. No blood in stool or urine. No fevers, chills or cough.          Data Review Today:     TTE February 8, 2024:  The visual ejection fraction is  55-60%.  Left ventricular systolic function is normal.  The right ventricle is mildly dilated.  The right ventricular systolic function is moderately reduced.  29mm Medtronic Evolut Pro TAVR valve in the aortic position.,  The gradient is normal for this prosthetic aortic valve.  Right ventricular systolic pressure is elevated, consistent with moderate  pulmonary hypertension.  The rhythm was atrial fibrillation.  The study was technically difficult. The study was technically limited.     Echocardiogram June 20, 2022:  Left ventricular systolic function is mildly reduced.  The visual ejection fraction is 45-50%.  There is a bioprosthetic aortic valve. 29mm Medtronic Evolut Pro, implanted  7/2020  The mean AoV pressure gradient is 9mmHg.  Right ventricular systolic pressure is elevated, consistent with mild  pulmonary hypertension.  Compared to 6/21, mean gradients across bioprosthetic aortic valve is  unchanged. EF is likley unchanged. The study was technically difficult          I have reviewed and updated the patient's past medical history, allergy list and medication list.          Physical Examination   Vitals: There were no vitals taken for this visit.    BMI= There is no height or weight on file to calculate BMI.    Wt Readings from Last 3 Encounters:   08/05/24 64 kg (141 lb)   07/11/24 68.9 kg (152 lb)   05/20/24 64 kg (141 lb)       General :   Alert and oriented, in no acute distress.    HEENT:  Normocephalic and atraumatic. .    Neck: No JVP, carotid bruit or obvious thyromegaly.   Lungs:   Respirations unlabored. Clear bilaterally with no rales, rhonchi, or wheezes.     Cardiovascular:   Rhythm is regular. S1 and S2 are normal. No significant murmur is present. Lower extremities demonstrate 1+  edema around ankles       Skin: Skin is warm, dry, and otherwise intact.   Neurologic: Gait not assessed. Mood and affect appropriate.           Medical History  Surgical History Family History Social History    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    CKD (chronic kidney disease) stage 3, GFR 30-59 ml/min (H) 05/12/2011    Community acquired pneumonia 09/29/2020    Coronary atherosclerosis of unspecified type of vessel, native or graft     s/p CABG 2002    Esophageal ulcer w UGI bleed 05/24/2020    Had EGD adn caurtery 5/24/20, colonoscopy with diverticulosis then    Hyperlipidemia LDL goal < 100     Hypertension goal BP (blood pressure) < 140/90     Localized osteoarthrosis not specified whether primary or secondary, lower leg     left knee    Mild persistent asthma without complication 01/11/2016    Moderate persistent asthma     JOSÉ ANTONIO (obstructive sleep apnea)     Persistent atrial fibrillation (H)     Unspecified hearing loss     Past Surgical History:   Procedure Laterality Date    CARDIAC SURGERY      COLONOSCOPY N/A 5/26/2020    Procedure: COLONOSCOPY;  Surgeon: Ignacio Fournier MD;  Location:  GI    CV ANGIOGRAM CORONARY GRAFT N/A 4/24/2020    Procedure: Angiogram Coronary Graft;  Surgeon: Addison Willard MD;  Location: [facility] CARDIAC CATH LAB    CV CORONARY ANGIOGRAM N/A 4/24/2020    Procedure: Coronary Angiogram;  Surgeon: Addison Willard MD;  Location:  HEART CARDIAC CATH LAB    CV RIGHT HEART CATH MEASUREMENTS RECORDED N/A 4/24/2020    Procedure: Right Heart Cath;  Surgeon: Addison Willard MD;  Location:  HEART CARDIAC CATH LAB    CV TRANSCATHETER AORTIC VALVE REPLACEMENT N/A 7/14/2020    Procedure: Transcatheter Aortic Valve Replacement;  Surgeon: Soraida Monet MD;  Location:  HEART CARDIAC CATH LAB    EP PACEMAKER N/A 7/15/2020    Procedure: EP Pacemaker;  Surgeon: Tommie Sauer MD;  Location:  HEART CARDIAC CATH LAB    HC ESOPHAGOSCOPY, DIAGNOSTIC  5/03      Victor M, lower esophageal ring & mild gastritis    HERNIORRHAPHY INGUINAL Right 2016    Procedure: HERNIORRHAPHY INGUINAL;  Surgeon: Alexis Alexandra MD;  Location:  SD    HERNIORRHAPHY INGUINAL Left 2023    Procedure: LEFT GROIN EXPLORATION, SEGMENTAL SMALL BOWEL RESECTION, TRANSINGUINAL REPAIR OF INCARCERATED/STRANGULATED HERNIA;  Surgeon: Dwayne Russell MD;  Location:  OR    LAPAROSCOPIC CHOLECYSTECTOMY      for biliary sludge    REVERSE ARTHROPLASTY SHOULDER Right 2022    Procedure: RIGHT REVERSE SHOULDER ARTHROPLASTY, WITH OPEN REDUCTION INTERNAL FIXATION, WITH NO CUSTOM GUIDE;  Surgeon: Wiley Vargas MD;  Location:  OR    ZZC NONSPECIFIC PROCEDURE      Coronary artery bypass    ZZHC COLONOSCOPY THRU STOMA W BIOPSY/CAUTERY TUMOR/POLYP/LESION      Dr. Dow, Q 5 years    ZZHC COLONOSCOPY THRU STOMA W BIOPSY/CAUTERY TUMOR/POLYP/LESION      Dr. Dow, one adenomatous polyp    Family History   Problem Relation Age of Onset    C.A.D. Father     Cancer Mother         breast cancer,  of pneumonia    Cerebrovascular Disease Son     CABG Son     Family History Negative Child     Family History Negative Sister     Heart Disease Brother     Social History     Socioeconomic History    Marital status:      Spouse name: Kailey    Number of children: 3    Years of education: Not on file    Highest education level: Not on file   Occupational History    Occupation: [employer]     Employer: RETIRED   Tobacco Use    Smoking status: Never     Passive exposure: Never    Smokeless tobacco: Never    Tobacco comments:     smoked for a week in 20's   Vaping Use    Vaping status: Never Used   Substance and Sexual Activity    Alcohol use: Not Currently     Alcohol/week: 0.0 standard drinks of alcohol    Drug use: No    Sexual activity: Yes     Partners: Female   Other Topics Concern     Service No    Blood Transfusions No    Caffeine Concern Not Asked     Occupational Exposure Not Asked    Hobby Hazards Not Asked    Sleep Concern Not Asked    Stress Concern Not Asked    Weight Concern Not Asked    Special Diet Not Asked    Back Care Not Asked    Exercise Yes    Bike Helmet Not Asked    Seat Belt Yes    Self-Exams No    Parent/sibling w/ CABG, MI or angioplasty before 65F 55M? Yes   Social History Narrative    Balanced Diet - Yes    Osteoporosis Preventative measures-  Dairy servings per day: 2 to 3 servings daily    Regular Exercise -  Yes Describe walks 1.5 mile daily     Dental Exam up - YES - Date: 2 years ago    Eye Exam - YES - Date: 1 year ago    Self Testicular Exam -  No, handout given    Do you have any concerns about STD's -  No    Abuse: Current or Past (Physical, Sexual or Emotional)- No    Do you feel safe in your environment - Yes    Guns stored in the home - Yes, locked    Sunscreen used - No    Seatbelts used - Yes    Lipids - YES - Date: 3-09    Glucose -  YES - Date: 3-09    Colon Cancer Screening - Colonoscopy 3-08(date completed)    Hemoccults - UNKNOWN    PSA - YES - Date: 11-07    Digital Rectal Exam - UNKNOWN    Immunizations reviewed and up to date - Yes, td 1-2005, had shingles vaccine    5-28-09  JANEY Patel [facility]                     Social Drivers of Health     Financial Resource Strain: Low Risk  (12/27/2023)    Financial Resource Strain     Within the past 12 months, have you or your family members you live with been unable to get utilities (heat, electricity) when it was really needed?: No   Food Insecurity: Low Risk  (12/27/2023)    Food Insecurity     Within the past 12 months, did you worry that your food would run out before you got money to buy more?: No     Within the past 12 months, did the food you bought just not last and you didn t have money to get more?: No   Transportation Needs: Low Risk  (12/27/2023)    Transportation Needs     Within the past 12 months, has lack of transportation kept you from medical appointments, getting  your medicines, non-medical meetings or appointments, work, or from getting things that you need?: No   Physical Activity: Insufficiently Active (10/28/2020)    Exercise Vital Sign     Days of Exercise per Week: 4 days     Minutes of Exercise per Session: 30 min   Stress: Not on file   Social Connections: Unknown (3/19/2021)    Social Connection and Isolation Panel [NHANES]     Frequency of Communication with Friends and Family: More than three times a week     Frequency of Social Gatherings with Friends and Family: Not on file     Attends Orthodoxy Services: Not on file     Active Member of Clubs or Organizations: Not on file     Attends Club or Organization Meetings: Not on file     Marital Status:    Interpersonal Safety: Low Risk  (7/11/2024)    Interpersonal Safety     Do you feel physically and emotionally safe where you currently live?: Yes     Within the past 12 months, have you been hit, slapped, kicked or otherwise physically hurt by someone?: No     Within the past 12 months, have you been humiliated or emotionally abused in other ways by your partner or ex-partner?: No   Housing Stability: Low Risk  (12/27/2023)    Housing Stability     Do you have housing? : Yes     Are you worried about losing your housing?: No   Recent Concern: Housing Stability - High Risk (11/1/2023)    Housing Stability     Do you have housing? : No     Are you worried about losing your housing?: No          Medications  Allergies   Scheduled Meds:  Current Outpatient Medications   Medication Sig Dispense Refill    acetaminophen (TYLENOL) 500 MG tablet Take 1,000 mg by mouth every 8 hours as needed      albuterol (PROAIR HFA/PROVENTIL HFA/VENTOLIN HFA) 108 (90 Base) MCG/ACT inhaler INHALE 1 TO 2 PUFFS BY MOUTH EVERY 4 TO 6 HOURS AS NEEDED 18 g 2    aspirin (ASA) 81 MG EC tablet Take 81 mg by mouth 2 times daily 0700 1700      budesonide (PULMICORT) 0.5 MG/2ML neb solution Take 2 mLs (0.5 mg) by nebulization 2 times daily 120  mL 3    calcium carbonate 600 mg-vitamin D 400 units (CALTRATE) 600-400 MG-UNIT per tablet Take 1 tablet by mouth daily      dabigatran ANTICOAGULANT (PRADAXA ANTICOAGULANT) 75 MG capsule TAKE 1 CAPSULE BY MOUTH 2 TIMES DAILY. 60 capsule 11    hydrALAZINE (APRESOLINE) 25 MG tablet Take 1 tablet (25 mg) by mouth 3 times daily 270 tablet 3    ipratropium - albuterol 0.5 mg/2.5 mg/3 mL (DUONEB) 0.5-2.5 (3) MG/3ML neb solution Inhale 1 vial (3 mLs) into the lungs 4 times daily 360 mL 11    isosorbide mononitrate (IMDUR) 60 MG 24 hr tablet Take 1 tablet (60 mg) by mouth daily Can take 2 pills of home supply of 30 mg tabs to equal 60 mg daily to finish existing home supply. 90 tablet 3    magnesium chloride (MAG64) 535 (64 Mg) MG TBEC CR tablet Take 1 tablet (535 mg) by mouth 2 times daily 180 tablet 3    metoprolol tartrate (LOPRESSOR) 25 MG tablet Take 1 tablet (25 mg) by mouth 2 times daily 180 tablet 3    montelukast (SINGULAIR) 10 MG tablet Take 1 tablet (10 mg) by mouth daily. 90 tablet 0    multivitamin, therapeutic (THERA-VIT) TABS tablet Take 1 tablet by mouth 2 times daily      nitroGLYcerin (NITROSTAT) 0.4 MG sublingual tablet For chest pain place 1 tablet under the tongue every 5 minutes for 3 doses. If symptoms persist 5 minutes after 1st dose call 911. (Patient not taking: Reported on 9/3/2024) 12 tablet 0    pantoprazole (PROTONIX) 40 MG EC tablet TAKE 1 TABLET BEFORE MEALS TWICE A DAY Strength: 40 mg 180 tablet 1    simvastatin (ZOCOR) 40 MG tablet Take 1 tablet (40 mg) by mouth at bedtime 90 tablet 3    torsemide (DEMADEX) 5 MG tablet Take 10 mg every morning and 5 mg every afternoon 270 tablet 3    triamcinolone (KENALOG) 0.1 % external lotion Apply topically 2 times daily 60 mL 1    No Known Allergies      Lab Results    Chemistry/lipid CBC Cardiac Enzymes/BNP/TSH/INR   Lab Results   Component Value Date    CHOL 126 11/01/2023    HDL 43 11/01/2023    TRIG 98 11/01/2023    BUN 68.4 (H) 05/17/2024    NA  138 07/11/2024    CO2 25 05/17/2024    Lab Results   Component Value Date    WBC 4.3 07/11/2024    HGB 10.9 (L) 07/11/2024    HCT 35.3 (L) 07/11/2024    MCV 66 (L) 07/11/2024     (L) 07/11/2024    @RESUFAST(BMP,CBC,BNP,TSH,  INR)@      28 minutes spent reviewing prior records (including documentation, laboratory studies, cardiac testing/imaging), history and physical exam, planning, and subsequent documentation.     This note has been dictated using voice recognition software. Any grammatical, typographical, or context distortions are unintentional and inherent to the software.    Melissa Cevallos PA-C, RD  General Cardiology

## 2025-02-10 ENCOUNTER — OFFICE VISIT (OUTPATIENT)
Dept: FAMILY MEDICINE | Facility: CLINIC | Age: OVER 89
End: 2025-02-10
Payer: COMMERCIAL

## 2025-02-10 VITALS
HEART RATE: 50 BPM | TEMPERATURE: 97.2 F | RESPIRATION RATE: 20 BRPM | OXYGEN SATURATION: 96 % | DIASTOLIC BLOOD PRESSURE: 71 MMHG | SYSTOLIC BLOOD PRESSURE: 151 MMHG

## 2025-02-10 DIAGNOSIS — N18.32 STAGE 3B CHRONIC KIDNEY DISEASE (H): ICD-10-CM

## 2025-02-10 DIAGNOSIS — E78.5 HYPERLIPIDEMIA WITH TARGET LDL LESS THAN 100: Primary | ICD-10-CM

## 2025-02-10 DIAGNOSIS — I10 HYPERTENSION GOAL BP (BLOOD PRESSURE) < 140/90: ICD-10-CM

## 2025-02-10 DIAGNOSIS — I25.10 CORONARY ARTERY DISEASE INVOLVING NATIVE CORONARY ARTERY OF NATIVE HEART WITHOUT ANGINA PECTORIS: ICD-10-CM

## 2025-02-10 LAB
ALBUMIN SERPL BCG-MCNC: 4 G/DL (ref 3.5–5.2)
ALP SERPL-CCNC: 72 U/L (ref 40–150)
ALT SERPL W P-5'-P-CCNC: 15 U/L (ref 0–70)
ANION GAP SERPL CALCULATED.3IONS-SCNC: 15 MMOL/L (ref 7–15)
AST SERPL W P-5'-P-CCNC: 25 U/L (ref 0–45)
BILIRUB SERPL-MCNC: 0.5 MG/DL
BUN SERPL-MCNC: 81.9 MG/DL (ref 8–23)
CALCIUM SERPL-MCNC: 9.3 MG/DL (ref 8.8–10.4)
CHLORIDE SERPL-SCNC: 101 MMOL/L (ref 98–107)
CHOLEST SERPL-MCNC: 101 MG/DL
CREAT SERPL-MCNC: 2.1 MG/DL (ref 0.67–1.17)
EGFRCR SERPLBLD CKD-EPI 2021: 29 ML/MIN/1.73M2
FASTING STATUS PATIENT QL REPORTED: NO
FASTING STATUS PATIENT QL REPORTED: NO
GLUCOSE SERPL-MCNC: 117 MG/DL (ref 70–99)
HCO3 SERPL-SCNC: 23 MMOL/L (ref 22–29)
HDLC SERPL-MCNC: 41 MG/DL
LDLC SERPL CALC-MCNC: 43 MG/DL
NONHDLC SERPL-MCNC: 60 MG/DL
POTASSIUM SERPL-SCNC: 4.1 MMOL/L (ref 3.4–5.3)
PROT SERPL-MCNC: 7.2 G/DL (ref 6.4–8.3)
SODIUM SERPL-SCNC: 139 MMOL/L (ref 135–145)
TRIGL SERPL-MCNC: 84 MG/DL

## 2025-02-10 PROCEDURE — 82043 UR ALBUMIN QUANTITATIVE: CPT | Performed by: FAMILY MEDICINE

## 2025-02-10 PROCEDURE — T1013 SIGN LANG/ORAL INTERPRETER: HCPCS | Mod: U3

## 2025-02-10 PROCEDURE — 80053 COMPREHEN METABOLIC PANEL: CPT | Performed by: FAMILY MEDICINE

## 2025-02-10 PROCEDURE — 36415 COLL VENOUS BLD VENIPUNCTURE: CPT | Performed by: FAMILY MEDICINE

## 2025-02-10 PROCEDURE — 82570 ASSAY OF URINE CREATININE: CPT | Performed by: FAMILY MEDICINE

## 2025-02-10 PROCEDURE — 80061 LIPID PANEL: CPT | Performed by: FAMILY MEDICINE

## 2025-02-10 PROCEDURE — G2211 COMPLEX E/M VISIT ADD ON: HCPCS | Performed by: FAMILY MEDICINE

## 2025-02-10 PROCEDURE — 99214 OFFICE O/P EST MOD 30 MIN: CPT | Performed by: FAMILY MEDICINE

## 2025-02-10 ASSESSMENT — ASTHMA QUESTIONNAIRES
QUESTION_1 LAST FOUR WEEKS HOW MUCH OF THE TIME DID YOUR ASTHMA KEEP YOU FROM GETTING AS MUCH DONE AT WORK, SCHOOL OR AT HOME: NONE OF THE TIME
QUESTION_3 LAST FOUR WEEKS HOW OFTEN DID YOUR ASTHMA SYMPTOMS (WHEEZING, COUGHING, SHORTNESS OF BREATH, CHEST TIGHTNESS OR PAIN) WAKE YOU UP AT NIGHT OR EARLIER THAN USUAL IN THE MORNING: NOT AT ALL
QUESTION_5 LAST FOUR WEEKS HOW WOULD YOU RATE YOUR ASTHMA CONTROL: WELL CONTROLLED
ACT_TOTALSCORE: 23
QUESTION_4 LAST FOUR WEEKS HOW OFTEN HAVE YOU USED YOUR RESCUE INHALER OR NEBULIZER MEDICATION (SUCH AS ALBUTEROL): NOT AT ALL
ACT_TOTALSCORE: 23
QUESTION_2 LAST FOUR WEEKS HOW OFTEN HAVE YOU HAD SHORTNESS OF BREATH: ONCE OR TWICE A WEEK

## 2025-02-10 ASSESSMENT — PAIN SCALES - GENERAL: PAINLEVEL_OUTOF10: NO PAIN (0)

## 2025-02-10 NOTE — LETTER
February 11, 2025      Shiraz Ingram  [address]  [facility] [id number]        Dear ,    We are writing to inform you of your test results.    Your blood test for kidney function is worsening and it is getting closer to stage IV of kidney failure.  I recommend you to come back in 1 month and we should recheck your kidney function.  If it is keep getting worse we may need to involve kidney specialist.  Avoid any ibuprofen, Aleve, Advil, naproxen type of drugs.   Mild leaking of protein in your urine.  Better control of blood pressure is necessary to avoid further damage to your kidneys.  Your cholesterol is stable.     Resulted Orders   Lipid panel reflex to direct LDL Non-fasting   Result Value Ref Range    Cholesterol 101 <200 mg/dL    Triglycerides 84 <150 mg/dL    Direct Measure HDL 41 >=40 mg/dL    LDL Cholesterol Calculated 43 <100 mg/dL    Non HDL Cholesterol 60 <130 mg/dL    Patient Fasting > 8hrs? No     Narrative    Cholesterol  Desirable: < 200 mg/dL  Borderline High: 200 - 239 mg/dL  High: >= 240 mg/dL    Triglycerides  Normal: < 150 mg/dL  Borderline High: 150 - 199 mg/dL  High: 200-499 mg/dL  Very High: >= 500 mg/dL    Direct Measure HDL  Female: >= 50 mg/dL   Male: >= 40 mg/dL    LDL Cholesterol  Desirable: < 100 mg/dL  Above Desirable: 100 - 129 mg/dL   Borderline High: 130 - 159 mg/dL   High:  160 - 189 mg/dL   Very High: >= 190 mg/dL    Non HDL Cholesterol  Desirable: < 130 mg/dL  Above Desirable: 130 - 159 mg/dL  Borderline High: 160 - 189 mg/dL  High: 190 - 219 mg/dL  Very High: >= 220 mg/dL   Albumin Random Urine Quantitative with Creat Ratio   Result Value Ref Range    Creatinine Urine mg/dL 52.7 mg/dL      Comment:      The reference ranges have not been established in urine creatinine. The results should be integrated into the clinical context for interpretation.    Albumin Urine mg/L 22.4 mg/L      Comment:      The reference ranges have not been established in urine albumin.  The results should be integrated into the clinical context for interpretation.    Albumin Urine mg/g Cr 42.50 (H) 0.00 - 17.00 mg/g Cr      Comment:      Microalbuminuria is defined as an albumin:creatinine ratio of 17 to 299 for males and 25 to 299 for females. A ratio of albumin:creatinine of 300 or higher is indicative of overt proteinuria.  Due to biologic variability, positive results should be confirmed by a second, first-morning random or 24-hour timed urine specimen. If there is discrepancy, a third specimen is recommended. When 2 out of 3 results are in the microalbuminuria range, this is evidence for incipient nephropathy and warrants increased efforts at glucose control, blood pressure control, and institution of therapy with an angiotensin-converting-enzyme (ACE) inhibitor (if the patient can tolerate it).     Comprehensive metabolic panel   Result Value Ref Range    Sodium 139 135 - 145 mmol/L    Potassium 4.1 3.4 - 5.3 mmol/L    Carbon Dioxide (CO2) 23 22 - 29 mmol/L    Anion Gap 15 7 - 15 mmol/L    Urea Nitrogen 81.9 (H) 8.0 - 23.0 mg/dL    Creatinine 2.10 (H) 0.67 - 1.17 mg/dL    GFR Estimate 29 (L) >60 mL/min/1.73m2      Comment:      eGFR calculated using 2021 CKD-EPI equation.    Calcium 9.3 8.8 - 10.4 mg/dL    Chloride 101 98 - 107 mmol/L    Glucose 117 (H) 70 - 99 mg/dL    Alkaline Phosphatase 72 40 - 150 U/L    AST 25 0 - 45 U/L    ALT 15 0 - 70 U/L    Protein Total 7.2 6.4 - 8.3 g/dL    Albumin 4.0 3.5 - 5.2 g/dL    Bilirubin Total 0.5 <=1.2 mg/dL    Patient Fasting > 8hrs? No        If you have any questions or concerns, please call the clinic at the number listed above.       Sincerely,      Dany Rodriguez MD    Electronically signed    IM

## 2025-02-10 NOTE — PROGRESS NOTES
Assessment & Plan     Hyperlipidemia with target LDL less than 100  Very high ASCVD risk specially for his age and his cardiac risk factors.  On simvastatin.  We agreed to continue the same.  - Comprehensive metabolic panel; Future  - Comprehensive metabolic panel    Coronary artery disease involving native coronary artery of native heart without angina pectoris  See above  - Lipid panel reflex to direct LDL Non-fasting; Future  - Comprehensive metabolic panel; Future  - Lipid panel reflex to direct LDL Non-fasting  - Comprehensive metabolic panel    Stage 3b chronic kidney disease (H)  Avoid nephrotoxic agent.  Continue to monitor.  - Albumin Random Urine Quantitative with Creat Ratio; Future  - Albumin Random Urine Quantitative with Creat Ratio    Hypertension goal BP (blood pressure) < 140/90  Checks his blood pressure at home and his outside blood pressure -all were within normal limit and we agreed not to make any changes in his blood pressure medications.  He is a reliable historian.      Answered all of his questions via .    The longitudinal plan of care for the diagnosis(es)/condition(s) as documented were addressed during this visit. Due to the added complexity in care, I will continue to support Shiraz in the subsequent management and with ongoing continuity of care.          Subjective   Shiraz is a 89 year old, presenting for the following health issues:  Follow Up, Refill Request, and Recheck Medication        2/10/2025     7:10 AM   Additional Questions   Roomed by Shayla CHACON         2/10/2025   Declines Weight   Did patient decline having their weight taken? Yes     History of Present Illness       Reason for visit:  Follow up/med change     Been to cardiologist - no change in medications. Heart failure meds are same.   Will be having ECHO in March.   Breathing is fine.   Rescue inhaler around 5 times per month.   Using pulmicort neb bid and duoneb 4 times per day.   Pescribed by  lung specialist.   Oxygen at night time and when doing neb also.   Pradaxa brand name is not covered but generic is. Also eliquis and xarelto are covered.   Just few weeks ago cardiology appt - bp was fine. At home 119-139/80-90. Checks bp at home.   Ankle swelling gets better when lifts feet up. Does not wear compression socks.      used.         Objective    BP (!) 151/71   Pulse 50   Temp 97.2  F (36.2  C) (Temporal)   Resp 20   SpO2 96%   There is no height or weight on file to calculate BMI.  Physical Exam   L>R ankle some swelling.             Signed Electronically by: Dany Rodriguez MD

## 2025-02-11 LAB
CREAT UR-MCNC: 52.7 MG/DL
MICROALBUMIN UR-MCNC: 22.4 MG/L
MICROALBUMIN/CREAT UR: 42.5 MG/G CR (ref 0–17)

## 2025-02-11 NOTE — RESULT ENCOUNTER NOTE
Your blood test for kidney function is worsening and it is getting closer to stage IV of kidney failure.  I recommend you to come back in 1 month and we should recheck your kidney function.  If it is keep getting worse we may need to involve kidney specialist.  Avoid any ibuprofen, Aleve, Advil, naproxen type of drugs.  Mild leaking of protein in your urine.  Better control of blood pressure is necessary to avoid further damage to your kidneys.  Your cholesterol is stable.

## 2025-02-12 ENCOUNTER — TELEPHONE (OUTPATIENT)
Dept: PHARMACY | Facility: OTHER | Age: OVER 89
End: 2025-02-12
Payer: COMMERCIAL

## 2025-02-12 ENCOUNTER — TELEPHONE (OUTPATIENT)
Dept: FAMILY MEDICINE | Facility: CLINIC | Age: OVER 89
End: 2025-02-12
Payer: COMMERCIAL

## 2025-02-12 NOTE — TELEPHONE ENCOUNTER
He lives with one of his son and I think we have consent to communicate on file. If we can not reach his son, ok to just send a letter. He is a very reliable patient and would follow up as requested.

## 2025-02-12 NOTE — TELEPHONE ENCOUNTER
Dr. Rodriguez-Writer attempted calling patient's preferred number and phone call was never connected to  and no option to leave a voicemail.  May a letter be mailed to patient with result message?    Writer called patient's mobile number:  No answer/no option to connect with  nor leave voicemail.    Thank you!  KATHLEEN VelezN, RN-[facility]

## 2025-02-12 NOTE — TELEPHONE ENCOUNTER
Son Willi also calling back (CTC on file). Relayed results to him as well. Willi verbalized understanding and states he has no questions at this time.    KATHLEEN LeeN, PHN, AMB-BC (she/her)  [facility] RN

## 2025-02-12 NOTE — TELEPHONE ENCOUNTER
----- Message from Alessandra CHACON sent at 2/12/2025 11:03 AM CST -----    ----- Message -----  From: Dany Rodriguez MD  Sent: 2/11/2025   3:31 PM CST  To: [facility] Pool    Your blood test for kidney function is worsening and it is getting closer to stage IV of kidney failure.  I recommend you to come back in 1 month and we should recheck your kidney function.  If it is keep getting worse we may need to involve kidney specialist.  Avoid any ibuprofen, Aleve, Advil, naproxen type of drugs.  Mild leaking of protein in your urine.  Better control of blood pressure is necessary to avoid further damage to your kidneys.  Your cholesterol is stable.

## 2025-02-12 NOTE — TELEPHONE ENCOUNTER
Patient calling back with  on the line.  Clinician message relayed via phone.  Patient verbalizes understanding and agreement with plan, no questions at time of call.  One month follow-up scheduled with Dr. Rodriguez, 3/12/25 at 0730. Confirmed date, time, and location of appointment.    Stephanie Kilgore RN  [facility]

## 2025-02-12 NOTE — TELEPHONE ENCOUNTER
Consent to communicate with son, Willi, on file.    Writer called Willi:  Left message to call back and ask to speak with an available nurse.    KATHLEEN VelezN, RN-[facility]

## 2025-02-12 NOTE — TELEPHONE ENCOUNTER
MARGRET Recruitment: [facility] insurance     Referral outreach attempt #1 on February 12, 2025      Outcome: left voicemail- Call back number [phone]    MARGRET Soliz

## 2025-02-15 DIAGNOSIS — J45.40 MODERATE PERSISTENT ASTHMA WITHOUT COMPLICATION: ICD-10-CM

## 2025-02-15 RX ORDER — MONTELUKAST SODIUM 10 MG/1
1 TABLET ORAL DAILY
Qty: 90 TABLET | Refills: 1 | Status: SHIPPED | OUTPATIENT
Start: 2025-02-15

## 2025-03-05 ENCOUNTER — HOSPITAL ENCOUNTER (OUTPATIENT)
Dept: CARDIOLOGY | Facility: CLINIC | Age: OVER 89
Discharge: HOME | End: 2025-03-05
Attending: PHYSICIAN ASSISTANT
Payer: COMMERCIAL

## 2025-03-05 DIAGNOSIS — I10 HYPERTENSION GOAL BP (BLOOD PRESSURE) < 140/90: ICD-10-CM

## 2025-03-05 DIAGNOSIS — Z95.2 S/P TAVR (TRANSCATHETER AORTIC VALVE REPLACEMENT): ICD-10-CM

## 2025-03-05 DIAGNOSIS — I44.2 COMPLETE HEART BLOCK (H): ICD-10-CM

## 2025-03-05 LAB — LVEF ECHO: NORMAL

## 2025-03-05 PROCEDURE — 93306 TTE W/DOPPLER COMPLETE: CPT | Mod: 26 | Performed by: INTERNAL MEDICINE

## 2025-03-05 PROCEDURE — 93306 TTE W/DOPPLER COMPLETE: CPT

## 2025-03-06 ENCOUNTER — TELEPHONE (OUTPATIENT)
Dept: CARDIOLOGY | Facility: CLINIC | Age: OVER 89
End: 2025-03-06
Payer: COMMERCIAL

## 2025-03-06 NOTE — TELEPHONE ENCOUNTER
"Team received information from Melissa Cevallos, re Shiraz's Echo.    \"I personally reviewed the results of the echocardiogram which shows normal ejection fraction 55% to 60%, RV is moderately dilated.  Pulmonary hypertension is present.  Compared to previous the RV is mildly larger and the RVSP is higher.  Study was ordered to evaluate his TAVR 1 year from previous.  His TAVR valve looks great.  When I saw him last he was doing well from a heart failure and pulmonary hypertension standpoint.    Nursing staff, can you please update Shiraz on his echo, let him know that his heart function continues to look good and his valve has normal gradients and there is no concern for dysfunction.  Inquire how he has been feeling and let me know if he is having worsening shortness of breath or swelling.  Thank you \"    Writer called to son, Willi, who accepts all calls on behalf of Shiraz who is deaf.    Informed Willi and Shiraz of above information.  Requested that any concerns that Shiraz has right now can be addressed if they call us back on the Team 3 phone  [phone].  Otherwise, Shiraz has apmnt with Melissa on 4\8\25, and their questions can be addressed at that time.    Kaylynn Olivares RN on 3/6/2025 at 1:05 PM    "

## 2025-03-12 ENCOUNTER — OFFICE VISIT (OUTPATIENT)
Dept: FAMILY MEDICINE | Facility: CLINIC | Age: OVER 89
End: 2025-03-12
Payer: COMMERCIAL

## 2025-03-12 VITALS
SYSTOLIC BLOOD PRESSURE: 126 MMHG | WEIGHT: 143 LBS | BODY MASS INDEX: 22.98 KG/M2 | TEMPERATURE: 97.2 F | HEART RATE: 52 BPM | OXYGEN SATURATION: 96 % | DIASTOLIC BLOOD PRESSURE: 62 MMHG | RESPIRATION RATE: 18 BRPM | HEIGHT: 66 IN

## 2025-03-12 DIAGNOSIS — N18.4 CHRONIC KIDNEY DISEASE, STAGE IV (SEVERE) (H): Primary | ICD-10-CM

## 2025-03-12 DIAGNOSIS — I50.33 ACUTE ON CHRONIC DIASTOLIC CONGESTIVE HEART FAILURE (H): ICD-10-CM

## 2025-03-12 PROCEDURE — 3078F DIAST BP <80 MM HG: CPT | Performed by: FAMILY MEDICINE

## 2025-03-12 PROCEDURE — G2211 COMPLEX E/M VISIT ADD ON: HCPCS | Performed by: FAMILY MEDICINE

## 2025-03-12 PROCEDURE — 1126F AMNT PAIN NOTED NONE PRSNT: CPT | Performed by: FAMILY MEDICINE

## 2025-03-12 PROCEDURE — 3074F SYST BP LT 130 MM HG: CPT | Performed by: FAMILY MEDICINE

## 2025-03-12 PROCEDURE — 80053 COMPREHEN METABOLIC PANEL: CPT | Performed by: FAMILY MEDICINE

## 2025-03-12 PROCEDURE — 36415 COLL VENOUS BLD VENIPUNCTURE: CPT | Performed by: FAMILY MEDICINE

## 2025-03-12 PROCEDURE — 99214 OFFICE O/P EST MOD 30 MIN: CPT | Performed by: FAMILY MEDICINE

## 2025-03-12 ASSESSMENT — PAIN SCALES - GENERAL: PAINLEVEL_OUTOF10: NO PAIN (0)

## 2025-03-12 NOTE — PROGRESS NOTES
"  Assessment & Plan     Chronic kidney disease, stage IV (severe) (H)   He has been hovering on stage IIIb renal failure for a while but now he is getting closer to stage IV.  He is chronic multiple conditions including hypertension, heart failure may contribute.  He is on chronic diuretics which may adversely affect too.  We discussed seeing nephrology for further evaluation.  He is in agreement.  He is not too excited about dialysis but I reassured him that we are nowhere close to development of diabetes yet.  - Comprehensive metabolic panel (BMP + Alb, Alk Phos, ALT, AST, Total. Bili, TP); Future  - Adult Nephrology  Referral; Future  - Comprehensive metabolic panel (BMP + Alb, Alk Phos, ALT, AST, Total. Bili, TP)    Acute on chronic diastolic congestive heart failure (H)  See above.  Needing chronic diuretics.  He is wondering about alternative.  We discussed not to make any adjustment in his current Rx as it is all about risk versus benefit and he understood.  Not using any other nephrotoxic agents.                Subjective   Shiraz is a 89 year old, presenting for the following health issues:  kidney (recheck)        3/12/2025     7:55 AM   Additional Questions   Roomed by Magi dang         3/12/2025   Declines Weight   Did patient decline having their weight taken? Yes     History of Present Illness       CKD: He is not using over the counter pain medicine. He exercises with enough effort to increase his heart rate 20 to 29 minutes per day.  He exercises with enough effort to increase his heart rate 5 days per week.   He is taking medications regularly.      Not using any nsaids.         Objective    /62 (BP Location: Right arm, Patient Position: Sitting, Cuff Size: Adult Regular)   Pulse 52   Temp 97.2  F (36.2  C) (Temporal)   Resp 18   Ht 1.676 m (5' 6\")   Wt 64.9 kg (143 lb)   SpO2 96%   BMI 23.08 kg/m    Body mass index is 23.08 kg/m .  Physical Exam             The longitudinal plan of " care for the diagnosis(es)/condition(s) as documented were addressed during this visit. Due to the added complexity in care, I will continue to support Shiraz in the subsequent management and with ongoing continuity of care.  Signed Electronically by: Dany Rodriguez MD, MD

## 2025-03-13 LAB
ALBUMIN SERPL BCG-MCNC: 4.1 G/DL (ref 3.5–5.2)
ALP SERPL-CCNC: 104 U/L (ref 40–150)
ALT SERPL W P-5'-P-CCNC: 17 U/L (ref 0–70)
ANION GAP SERPL CALCULATED.3IONS-SCNC: 13 MMOL/L (ref 7–15)
AST SERPL W P-5'-P-CCNC: 30 U/L (ref 0–45)
BILIRUB SERPL-MCNC: 0.6 MG/DL
BUN SERPL-MCNC: 71.8 MG/DL (ref 8–23)
CALCIUM SERPL-MCNC: 9.9 MG/DL (ref 8.8–10.4)
CHLORIDE SERPL-SCNC: 103 MMOL/L (ref 98–107)
CREAT SERPL-MCNC: 2.1 MG/DL (ref 0.67–1.17)
EGFRCR SERPLBLD CKD-EPI 2021: 29 ML/MIN/1.73M2
GLUCOSE SERPL-MCNC: 112 MG/DL (ref 70–99)
HCO3 SERPL-SCNC: 25 MMOL/L (ref 22–29)
POTASSIUM SERPL-SCNC: 4.9 MMOL/L (ref 3.4–5.3)
PROT SERPL-MCNC: 7.4 G/DL (ref 6.4–8.3)
SODIUM SERPL-SCNC: 141 MMOL/L (ref 135–145)

## 2025-03-24 DIAGNOSIS — I50.31 ACUTE DIASTOLIC HEART FAILURE (H): ICD-10-CM

## 2025-03-24 RX ORDER — MAGNESIUM 64 MG (MAGNESIUM CHLORIDE) TABLET,DELAYED RELEASE
535 2 TIMES DAILY
Qty: 180 TABLET | Refills: 3 | Status: SHIPPED | OUTPATIENT
Start: 2025-03-24

## 2025-04-07 ENCOUNTER — TELEPHONE (OUTPATIENT)
Dept: CARDIOLOGY | Facility: CLINIC | Age: OVER 89
End: 2025-04-07

## 2025-04-07 DIAGNOSIS — I44.2 COMPLETE HEART BLOCK (H): Primary | ICD-10-CM

## 2025-04-07 DIAGNOSIS — I49.5 SSS (SICK SINUS SYNDROME) (H): ICD-10-CM

## 2025-04-07 DIAGNOSIS — Z95.0 CARDIAC PACEMAKER IN SITU: ICD-10-CM

## 2025-04-07 NOTE — PROGRESS NOTES
HEART CARE FOLLOW UP    Primary Care: Dany Rodriguez MD  Primary Cardiologist: Dr Field      Assessment/Recommendations   Congestive heart failure  Heart failure with midrange and now improved ejection fraction  Diastolic heart failure  Moderate pulmonary hypertension  Most recent echocardiogram in February 2024 ejection fraction of 55 to 60%.  The RV is mildly dilated with moderately reduced function.  He is doing very well and stable since fall 2024.  Weight stable, at home it varies between 140-142lbs, similar to last visit, possibly improved. Rarely goes to 144 lbs. Renal function stable, though slightly up from baseline. He does appear euvolemic. Functional capacity excellent for his age. TTE shows worsening RV dysfunction, likely due to pHTn. Feels well well. Will discuss with pHTN DIAZ.   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   Status post TAVR, most recent echocardiogram in February of this year shows Medtronic valve in the aortic position and a normal gradient. No concerning symptoms.   Repeat TTE in February 2026  Atrial Fibrillation, EKG today with wide complex bradycardia, underlying rhythm AF with V in 30-50s, no ventricular arrhythmias on device interrogation today. The patient is currently on rate control therapy.   Continue metoprolol  Educated to watch closely for dizziness and exertional limitations  Pacing 74.7%, similar to December (80%) which was up from previous; slowly uptrending throughout 2024, monitor for LV dysfunction   Continue pradaxa for anticoagulation, no evidence of abnormal bleeding or bruising  Hypertension, blood  pressure well on current regimen upon arrival but improved at the end of the visit. Reports medication compliance.   Continue regimen as is   Does not have home BP cuff     Return to care in 4 months with me     History of Present Illness/Subjective    Shiraz Ingram is a 89 year old male with past medical history significant for:  Chronic biventricular heart failure  Heart failure with midrange ejection fraction  Hypertension  Severe aortic stenosis status post TAVR  Status post 29 mm Medtronic implanted in July 2020, mean gradient around 9 mmHg  Atrial fibrillation, chronic  History of MAYA thrombus on anticoagulation  Pulmonary Hypertension   Coronary artery disease  Status post coronary artery bypass grafting with LIMA to the LAD, vein graft to the OM and the RCA  Complete Heart Block s/p PPPM  Complication of TAVR  Hyperlipidemia  CKD stage III  Obstructive sleep apnea  Small bowel obstruction, January 2023    I last saw him 1/28/2025 at which time he was stable. Was not walking outside due to cold/ice but tried to do intentional walking around the house and up the stairs 3x/day. Felt well. No limitations/symptoms. He felt stable compared to the fall. Weight at home varied 140-144 lbs.  His echocardiogram showed normal LV size and function with RV dilation and moderately reduced RV function. This RV enlargement was a progression from previous.    Today the patient tells me  he's doing mostly well. No specific concerns today.  He is walking 1/2-1 block to get fresh air and moves his body. Feels well when he does this, no limtiations. Patient denies chest pain, pressure and tightness. No shortness of breath or dyspnea on exertion. No dizziness, lightheadedness, pre-syncope or syncope. No palpitations or racing heart. Sleeping well, urinates a few times per night but without orthopnea or PND. No leg swelling. Gets cramps in his legs if eh walks too much. No claudication symptoms. No blood in stool or urine. No  "fevers, chills or cough. Weight 140-142 lbs, rarely up to 144ls.            Data Review Today:     TTE February 8, 2024:  The visual ejection fraction is 55-60%.  Left ventricular systolic function is normal.  The right ventricle is mildly dilated.  The right ventricular systolic function is moderately reduced.  29mm Medtronic Evolut Pro TAVR valve in the aortic position.,  The gradient is normal for this prosthetic aortic valve.  Right ventricular systolic pressure is elevated, consistent with moderate  pulmonary hypertension.  The rhythm was atrial fibrillation.  The study was technically difficult. The study was technically limited.     Echocardiogram June 20, 2022:  Left ventricular systolic function is mildly reduced.  The visual ejection fraction is 45-50%.  There is a bioprosthetic aortic valve. 29mm Medtronic Evolut Pro, implanted  7/2020  The mean AoV pressure gradient is 9mmHg.  Right ventricular systolic pressure is elevated, consistent with mild  pulmonary hypertension.  Compared to 6/21, mean gradients across bioprosthetic aortic valve is  unchanged. EF is likley unchanged. The study was technically difficult          I have reviewed and updated the patient's past medical history, allergy list and medication list.          Physical Examination   Vitals: BP (!) 174/79   Pulse 50   Ht 1.702 m (5' 7\")   Wt 64.4 kg (142 lb)   SpO2 95%   BMI 22.24 kg/m      BMI= Body mass index is 22.24 kg/m .    Wt Readings from Last 3 Encounters:   04/08/25 64.4 kg (142 lb)   03/12/25 64.9 kg (143 lb)   01/28/25 63.5 kg (140 lb)       General :   Alert and oriented, in no acute distress.    HEENT:  Normocephalic and atraumatic. .    Neck: No JVP, carotid bruit or obvious thyromegaly.   Lungs:   Respirations unlabored. Clear bilaterally with no rales, rhonchi, or wheezes.     Cardiovascular:   Rhythm is regular. S1 and S2 are normal. No significant murmur is present. Lower extremities demonstrate 1+  edema around " ankles       Skin: Skin is warm, dry, and otherwise intact.   Neurologic: Gait not assessed. Mood and affect appropriate.           Medical History  Surgical History Family History Social History   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    CKD (chronic kidney disease) stage 3, GFR 30-59 ml/min (H) 05/12/2011    Community acquired pneumonia 09/29/2020    Coronary atherosclerosis of unspecified type of vessel, native or graft     s/p CABG 2002    Esophageal ulcer w UGI bleed 05/24/2020    Had EGD adn caurtery 5/24/20, colonoscopy with diverticulosis then    Hyperlipidemia LDL goal < 100     Hypertension goal BP (blood pressure) < 140/90     Localized osteoarthrosis not specified whether primary or secondary, lower leg     left knee    Mild persistent asthma without complication 01/11/2016    Moderate persistent asthma     JOSÉ ANTONIO (obstructive sleep apnea)     Persistent atrial fibrillation (H)     Unspecified hearing loss     Past Surgical History:   Procedure Laterality Date    CARDIAC SURGERY      COLONOSCOPY N/A 5/26/2020    Procedure: COLONOSCOPY;  Surgeon: Ignacio Fournier MD;  Location:  GI    CV ANGIOGRAM CORONARY GRAFT N/A 4/24/2020    Procedure: Angiogram Coronary Graft;  Surgeon: Addison Willard MD;  Location: [facility] CARDIAC CATH LAB    CV CORONARY ANGIOGRAM N/A 4/24/2020    Procedure: Coronary Angiogram;  Surgeon: Addison Willard MD;  Location: [facility] CARDIAC CATH LAB    CV RIGHT HEART CATH MEASUREMENTS RECORDED N/A 4/24/2020    Procedure: Right Heart Cath;  Surgeon: Addison Willard MD;  Location: [facility] CARDIAC CATH LAB    CV TRANSCATHETER AORTIC VALVE REPLACEMENT N/A 7/14/2020    Procedure: Transcatheter Aortic Valve Replacement;  Surgeon: Soraida Monet MD;  Location: [facility]  CARDIAC CATH LAB    EP PACEMAKER N/A 7/15/2020    Procedure: EP Pacemaker;  Surgeon: Tommie Sauer MD;  Location:  HEART CARDIAC CATH LAB    HC ESOPHAGOSCOPY, DIAGNOSTIC      Dr. Dow, lower esophageal ring & mild gastritis    HERNIORRHAPHY INGUINAL Right 2016    Procedure: HERNIORRHAPHY INGUINAL;  Surgeon: Alexis Alexandra MD;  Location:  SD    HERNIORRHAPHY INGUINAL Left 2023    Procedure: LEFT GROIN EXPLORATION, SEGMENTAL SMALL BOWEL RESECTION, TRANSINGUINAL REPAIR OF INCARCERATED/STRANGULATED HERNIA;  Surgeon: Dwayne Russell MD;  Location:  OR    LAPAROSCOPIC CHOLECYSTECTOMY      for biliary sludge    REVERSE ARTHROPLASTY SHOULDER Right 2022    Procedure: RIGHT REVERSE SHOULDER ARTHROPLASTY, WITH OPEN REDUCTION INTERNAL FIXATION, WITH NO CUSTOM GUIDE;  Surgeon: Wiley Vargas MD;  Location:  OR    ZZC NONSPECIFIC PROCEDURE      Coronary artery bypass    ZZHC COLONOSCOPY THRU STOMA W BIOPSY/CAUTERY TUMOR/POLYP/LESION      Dr. Dow, Q 5 years    ZZHC COLONOSCOPY THRU STOMA W BIOPSY/CAUTERY TUMOR/POLYP/LESION      Dr. Dow, one adenomatous polyp    Family History   Problem Relation Age of Onset    C.A.D. Father     Cancer Mother         breast cancer,  of pneumonia    Cerebrovascular Disease Son     CABG Son     Family History Negative Child     Family History Negative Sister     Heart Disease Brother     Social History     Socioeconomic History    Marital status:      Spouse name: Kailey    Number of children: 3    Years of education: Not on file    Highest education level: Not on file   Occupational History    Occupation: [employer]     Employer: RETIRED   Tobacco Use    Smoking status: Former     Types: Cigarettes     Passive exposure: Never    Smokeless tobacco: Never    Tobacco comments:     smoked for a week in 20's   Vaping Use    Vaping status: Never Used   Substance and Sexual Activity    Alcohol use: Not Currently      Alcohol/week: 0.0 standard drinks of alcohol    Drug use: No    Sexual activity: Yes     Partners: Female   Other Topics Concern     Service No    Blood Transfusions No    Caffeine Concern Not Asked    Occupational Exposure Not Asked    Hobby Hazards Not Asked    Sleep Concern Not Asked    Stress Concern Not Asked    Weight Concern Not Asked    Special Diet Not Asked    Back Care Not Asked    Exercise Yes    Bike Helmet Not Asked    Seat Belt Yes    Self-Exams No    Parent/sibling w/ CABG, MI or angioplasty before 65F 55M? Yes   Social History Narrative    Balanced Diet - Yes    Osteoporosis Preventative measures-  Dairy servings per day: 2 to 3 servings daily    Regular Exercise -  Yes Describe walks 1.5 mile daily     Dental Exam up - YES - Date: 2 years ago    Eye Exam - YES - Date: 1 year ago    Self Testicular Exam -  No, handout given    Do you have any concerns about STD's -  No    Abuse: Current or Past (Physical, Sexual or Emotional)- No    Do you feel safe in your environment - Yes    Guns stored in the home - Yes, locked    Sunscreen used - No    Seatbelts used - Yes    Lipids - YES - Date: 3-09    Glucose -  YES - Date: 3-09    Colon Cancer Screening - Colonoscopy 3-08(date completed)    Hemoccults - UNKNOWN    PSA - YES - Date: 11-07    Digital Rectal Exam - UNKNOWN    Immunizations reviewed and up to date - Yes, td 1-2005, had shingles vaccine    5-28-09  JANEY Patel [facility]                     Social Drivers of Health     Financial Resource Strain: Low Risk  (12/27/2023)    Financial Resource Strain     Within the past 12 months, have you or your family members you live with been unable to get utilities (heat, electricity) when it was really needed?: No   Food Insecurity: Low Risk  (12/27/2023)    Food Insecurity     Within the past 12 months, did you worry that your food would run out before you got money to buy more?: No     Within the past 12 months, did the food you bought just not last and  you didn t have money to get more?: No   Transportation Needs: Low Risk  (12/27/2023)    Transportation Needs     Within the past 12 months, has lack of transportation kept you from medical appointments, getting your medicines, non-medical meetings or appointments, work, or from getting things that you need?: No   Physical Activity: Insufficiently Active (10/28/2020)    Exercise Vital Sign     Days of Exercise per Week: 4 days     Minutes of Exercise per Session: 30 min   Stress: Not on file   Social Connections: Unknown (3/19/2021)    Social Connection and Isolation Panel [NHANES]     Frequency of Communication with Friends and Family: More than three times a week     Frequency of Social Gatherings with Friends and Family: Not on file     Attends Holiness Services: Not on file     Active Member of Clubs or Organizations: Not on file     Attends Club or Organization Meetings: Not on file     Marital Status:    Interpersonal Safety: Low Risk  (2/10/2025)    Interpersonal Safety     Do you feel physically and emotionally safe where you currently live?: Yes     Within the past 12 months, have you been hit, slapped, kicked or otherwise physically hurt by someone?: No     Within the past 12 months, have you been humiliated or emotionally abused in other ways by your partner or ex-partner?: No   Housing Stability: Low Risk  (12/27/2023)    Housing Stability     Do you have housing? : Yes     Are you worried about losing your housing?: No   Recent Concern: Housing Stability - High Risk (11/1/2023)    Housing Stability     Do you have housing? : No     Are you worried about losing your housing?: No          Medications  Allergies   Scheduled Meds:  Current Outpatient Medications   Medication Sig Dispense Refill    acetaminophen (TYLENOL) 500 MG tablet Take 1,000 mg by mouth every 8 hours as needed      albuterol (PROAIR HFA/PROVENTIL HFA/VENTOLIN HFA) 108 (90 Base) MCG/ACT inhaler INHALE 1 TO 2 PUFFS BY MOUTH EVERY  4 TO 6 HOURS AS NEEDED 18 g 2    apixaban ANTICOAGULANT (ELIQUIS) 2.5 MG tablet Take 1 tablet (2.5 mg) by mouth 2 times daily. 90 tablet 1    aspirin (ASA) 81 MG EC tablet Take 81 mg by mouth 2 times daily 0700 1700      budesonide (PULMICORT) 0.5 MG/2ML neb solution Take 2 mLs (0.5 mg) by nebulization 2 times daily 120 mL 3    calcium carbonate 600 mg-vitamin D 400 units (CALTRATE) 600-400 MG-UNIT per tablet Take 1 tablet by mouth daily      hydrALAZINE (APRESOLINE) 25 MG tablet Take 1 tablet (25 mg) by mouth 3 times daily. 270 tablet 3    ipratropium - albuterol 0.5 mg/2.5 mg/3 mL (DUONEB) 0.5-2.5 (3) MG/3ML neb solution Inhale 1 vial (3 mLs) into the lungs 4 times daily 360 mL 11    isosorbide mononitrate (IMDUR) 60 MG 24 hr tablet Take 1 tablet (60 mg) by mouth daily. 90 tablet 3    magnesium chloride (MAG64) 535 (64 Mg) MG TBEC CR tablet Take 1 tablet (535 mg) by mouth 2 times daily. 180 tablet 3    metoprolol tartrate (LOPRESSOR) 25 MG tablet Take 1 tablet (25 mg) by mouth 2 times daily. 180 tablet 3    montelukast (SINGULAIR) 10 MG tablet TAKE 1 TABLET (10 MG) BY MOUTH DAILY. 90 tablet 1    multivitamin, therapeutic (THERA-VIT) TABS tablet Take 1 tablet by mouth 2 times daily      nitroGLYcerin (NITROSTAT) 0.4 MG sublingual tablet For chest pain place 1 tablet under the tongue every 5 minutes for 3 doses. If symptoms persist 5 minutes after 1st dose call 911. 12 tablet 0    pantoprazole (PROTONIX) 40 MG EC tablet TAKE 1 TABLET BEFORE MEALS TWICE A DAY Strength: 40 mg 180 tablet 1    simvastatin (ZOCOR) 40 MG tablet Take 1 tablet (40 mg) by mouth at bedtime. 90 tablet 3    torsemide (DEMADEX) 5 MG tablet Take 10 mg every morning and 5 mg every afternoon 270 tablet 3    triamcinolone (KENALOG) 0.1 % external lotion Apply topically 2 times daily 60 mL 1    No Known Allergies      Lab Results    Chemistry/lipid CBC Cardiac Enzymes/BNP/TSH/INR   Lab Results   Component Value Date    CHOL 101 02/10/2025    HDL 41  02/10/2025    TRIG 84 02/10/2025    BUN 71.8 (H) 03/12/2025     03/12/2025    CO2 25 03/12/2025    Lab Results   Component Value Date    WBC 4.3 07/11/2024    HGB 10.9 (L) 07/11/2024    HCT 35.3 (L) 07/11/2024    MCV 66 (L) 07/11/2024     (L) 07/11/2024    @RESUFAST(BMP,CBC,BNP,TSH,  INR)@      25 minutes spent reviewing prior records (including documentation, laboratory studies, cardiac testing/imaging), history and physical exam, planning, and subsequent documentation.     The longitudinal plan of care for the diagnosis(es)/condition(s) as documented were addressed during this visit. Due to the added complexity in care, I will continue to support Shiraz in the subsequent management and with ongoing continuity of care.      This note has been dictated using voice recognition software. Any grammatical, typographical, or context distortions are unintentional and inherent to the software.    Melissa Cevallos PA-C, RD  General Cardiology

## 2025-04-08 ENCOUNTER — ANCILLARY PROCEDURE (OUTPATIENT)
Dept: CARDIOLOGY | Facility: CLINIC | Age: OVER 89
End: 2025-04-08
Attending: INTERNAL MEDICINE
Payer: COMMERCIAL

## 2025-04-08 ENCOUNTER — OFFICE VISIT (OUTPATIENT)
Dept: CARDIOLOGY | Facility: CLINIC | Age: OVER 89
End: 2025-04-08
Attending: PHYSICIAN ASSISTANT
Payer: COMMERCIAL

## 2025-04-08 VITALS
HEIGHT: 67 IN | BODY MASS INDEX: 22.29 KG/M2 | OXYGEN SATURATION: 95 % | SYSTOLIC BLOOD PRESSURE: 174 MMHG | HEART RATE: 50 BPM | DIASTOLIC BLOOD PRESSURE: 79 MMHG | WEIGHT: 142 LBS

## 2025-04-08 DIAGNOSIS — Z95.2 S/P TAVR (TRANSCATHETER AORTIC VALVE REPLACEMENT): ICD-10-CM

## 2025-04-08 DIAGNOSIS — E78.5 HYPERLIPIDEMIA WITH TARGET LDL LESS THAN 100: Primary | ICD-10-CM

## 2025-04-08 DIAGNOSIS — I48.21 PERMANENT ATRIAL FIBRILLATION (H): ICD-10-CM

## 2025-04-08 DIAGNOSIS — I49.5 SSS (SICK SINUS SYNDROME) (H): ICD-10-CM

## 2025-04-08 DIAGNOSIS — I44.2 COMPLETE HEART BLOCK (H): ICD-10-CM

## 2025-04-08 DIAGNOSIS — I10 HYPERTENSION GOAL BP (BLOOD PRESSURE) < 140/90: ICD-10-CM

## 2025-04-08 DIAGNOSIS — Z95.0 CARDIAC PACEMAKER IN SITU: ICD-10-CM

## 2025-04-08 LAB
MDC_IDC_LEAD_CONNECTION_STATUS: NORMAL
MDC_IDC_LEAD_IMPLANT_DT: NORMAL
MDC_IDC_LEAD_LOCATION: NORMAL
MDC_IDC_LEAD_LOCATION_DETAIL_1: NORMAL
MDC_IDC_LEAD_MFG: NORMAL
MDC_IDC_LEAD_MODEL: NORMAL
MDC_IDC_LEAD_POLARITY_TYPE: NORMAL
MDC_IDC_LEAD_SERIAL: NORMAL
MDC_IDC_MSMT_BATTERY_DTM: NORMAL
MDC_IDC_MSMT_BATTERY_REMAINING_LONGEVITY: 123 MO
MDC_IDC_MSMT_BATTERY_RRT_TRIGGER: 2.62
MDC_IDC_MSMT_BATTERY_STATUS: NORMAL
MDC_IDC_MSMT_BATTERY_VOLTAGE: 3.02 V
MDC_IDC_MSMT_LEADCHNL_RV_IMPEDANCE_VALUE: 323 OHM
MDC_IDC_MSMT_LEADCHNL_RV_IMPEDANCE_VALUE: 380 OHM
MDC_IDC_MSMT_LEADCHNL_RV_PACING_THRESHOLD_AMPLITUDE: 0.62 V
MDC_IDC_MSMT_LEADCHNL_RV_PACING_THRESHOLD_PULSEWIDTH: 0.4 MS
MDC_IDC_MSMT_LEADCHNL_RV_SENSING_INTR_AMPL: 8.12 MV
MDC_IDC_MSMT_LEADCHNL_RV_SENSING_INTR_AMPL: 8.12 MV
MDC_IDC_PG_IMPLANT_DTM: NORMAL
MDC_IDC_PG_MFG: NORMAL
MDC_IDC_PG_MODEL: NORMAL
MDC_IDC_PG_SERIAL: NORMAL
MDC_IDC_PG_TYPE: NORMAL
MDC_IDC_SESS_CLINIC_NAME: NORMAL
MDC_IDC_SESS_DTM: NORMAL
MDC_IDC_SESS_TYPE: NORMAL
MDC_IDC_SET_BRADY_HYSTRATE: NORMAL
MDC_IDC_SET_BRADY_LOWRATE: 50 {BEATS}/MIN
MDC_IDC_SET_BRADY_MODE: NORMAL
MDC_IDC_SET_LEADCHNL_RV_PACING_AMPLITUDE: 2 V
MDC_IDC_SET_LEADCHNL_RV_PACING_ANODE_ELECTRODE_1: NORMAL
MDC_IDC_SET_LEADCHNL_RV_PACING_ANODE_LOCATION_1: NORMAL
MDC_IDC_SET_LEADCHNL_RV_PACING_CAPTURE_MODE: NORMAL
MDC_IDC_SET_LEADCHNL_RV_PACING_CATHODE_ELECTRODE_1: NORMAL
MDC_IDC_SET_LEADCHNL_RV_PACING_CATHODE_LOCATION_1: NORMAL
MDC_IDC_SET_LEADCHNL_RV_PACING_POLARITY: NORMAL
MDC_IDC_SET_LEADCHNL_RV_PACING_PULSEWIDTH: 0.4 MS
MDC_IDC_SET_LEADCHNL_RV_SENSING_ANODE_ELECTRODE_1: NORMAL
MDC_IDC_SET_LEADCHNL_RV_SENSING_ANODE_LOCATION_1: NORMAL
MDC_IDC_SET_LEADCHNL_RV_SENSING_CATHODE_ELECTRODE_1: NORMAL
MDC_IDC_SET_LEADCHNL_RV_SENSING_CATHODE_LOCATION_1: NORMAL
MDC_IDC_SET_LEADCHNL_RV_SENSING_POLARITY: NORMAL
MDC_IDC_SET_LEADCHNL_RV_SENSING_SENSITIVITY: 0.9 MV
MDC_IDC_SET_ZONE_DETECTION_INTERVAL: 360 MS
MDC_IDC_SET_ZONE_STATUS: NORMAL
MDC_IDC_SET_ZONE_TYPE: NORMAL
MDC_IDC_SET_ZONE_VENDOR_TYPE: NORMAL
MDC_IDC_STAT_BRADY_DTM_END: NORMAL
MDC_IDC_STAT_BRADY_DTM_START: NORMAL
MDC_IDC_STAT_BRADY_RV_PERCENT_PACED: 74.67 %
MDC_IDC_STAT_EPISODE_RECENT_COUNT: 0
MDC_IDC_STAT_EPISODE_RECENT_COUNT_DTM_END: NORMAL
MDC_IDC_STAT_EPISODE_RECENT_COUNT_DTM_START: NORMAL
MDC_IDC_STAT_EPISODE_TOTAL_COUNT: 0
MDC_IDC_STAT_EPISODE_TOTAL_COUNT: 0
MDC_IDC_STAT_EPISODE_TOTAL_COUNT: 6
MDC_IDC_STAT_EPISODE_TOTAL_COUNT_DTM_END: NORMAL
MDC_IDC_STAT_EPISODE_TOTAL_COUNT_DTM_START: NORMAL
MDC_IDC_STAT_EPISODE_TYPE: NORMAL

## 2025-04-08 PROCEDURE — 93279 PRGRMG DEV EVAL PM/LDLS PM: CPT | Performed by: INTERNAL MEDICINE

## 2025-04-08 PROCEDURE — 99214 OFFICE O/P EST MOD 30 MIN: CPT | Mod: 25 | Performed by: PHYSICIAN ASSISTANT

## 2025-04-08 PROCEDURE — 93000 ELECTROCARDIOGRAM COMPLETE: CPT | Performed by: PHYSICIAN ASSISTANT

## 2025-04-08 PROCEDURE — 3077F SYST BP >= 140 MM HG: CPT | Performed by: PHYSICIAN ASSISTANT

## 2025-04-08 PROCEDURE — 3078F DIAST BP <80 MM HG: CPT | Performed by: PHYSICIAN ASSISTANT

## 2025-04-08 NOTE — Clinical Note
Génesis Pizano, non urgent question. This is a nice little 89 year old with worsening pulmonary artery pressures on echo and worsening RV enlargement/function. He's healthy, happy, feels well, active for his age. Do you think there is benefit in him seeing you and considering PHTN meds, or at his age should I just continue to monitor his volume? Thanks for considering!  Melissa Cevallos PA-C

## 2025-04-08 NOTE — LETTER
4/8/2025    Dany Rodriguez MD, MD  [address]  [place] [address]    RE: Shiraz Ingram       Dear Colleague,     I had the pleasure of seeing Shiraz Ingram in the [facility].          HEART CARE FOLLOW UP    Primary Care: Dany Rodriguez MD  Primary Cardiologist: Dr Field      Assessment/Recommendations   Congestive heart failure  Heart failure with midrange and now improved ejection fraction  Diastolic heart failure  Moderate pulmonary hypertension  Most recent echocardiogram in February 2024 ejection fraction of 55 to 60%.  The RV is mildly dilated with moderately reduced function.  He is doing very well and stable since fall 2024.  Weight stable, at home it varies between 140-142lbs, similar to last visit, possibly improved. Rarely goes to 144 lbs. Renal function stable, though slightly up from baseline. He does appear euvolemic. Functional capacity excellent for his age. TTE shows worsening RV dysfunction, likely due to pHTn. Feels well well. Will discuss with pHTN DIAZ.   Continue hydralazine 25 mg 3 times daily  Continue Imdur 60 mg daily  Continue Toprol tartrate 25 mg twice a day  reduce torsemide 10 mg in the morning and 5 mg in the afternoon to 10 mg daily with extra tablet if weight hits 144 lbs  Compliance with nighttime oxygen is essential   Status post TAVR, most recent echocardiogram in February of this year shows Medtronic valve in the aortic position and a normal gradient. No concerning symptoms.   Repeat TTE in February 2026  Atrial Fibrillation, EKG today with wide complex bradycardia, underlying rhythm AF with V in 30-50s, no ventricular arrhythmias on device interrogation today. The patient is currently on rate control therapy.   Continue metoprolol  Educated to watch closely for dizziness and exertional limitations  Pacing 74.7%, similar to December (80%) which was up from previous; slowly uptrending throughout 2024, monitor for LV dysfunction    Continue pradaxa for anticoagulation, no evidence of abnormal bleeding or bruising  Hypertension, blood pressure well on current regimen upon arrival but improved at the end of the visit. Reports medication compliance.   Continue regimen as is   Does not have home BP cuff     Return to care in 4 months with me     History of Present Illness/Subjective    Shiraz Ingram is a 89 year old male with past medical history significant for:  Chronic biventricular heart failure  Heart failure with midrange ejection fraction  Hypertension  Severe aortic stenosis status post TAVR  Status post 29 mm Medtronic implanted in July 2020, mean gradient around 9 mmHg  Atrial fibrillation, chronic  History of MAYA thrombus on anticoagulation  Pulmonary Hypertension   Coronary artery disease  Status post coronary artery bypass grafting with LIMA to the LAD, vein graft to the OM and the RCA  Complete Heart Block s/p PPPM  Complication of TAVR  Hyperlipidemia  CKD stage III  Obstructive sleep apnea  Small bowel obstruction, January 2023    I last saw him 1/28/2025 at which time he was stable. Was not walking outside due to cold/ice but tried to do intentional walking around the house and up the stairs 3x/day. Felt well. No limitations/symptoms. He felt stable compared to the fall. Weight at home varied 140-144 lbs.  His echocardiogram showed normal LV size and function with RV dilation and moderately reduced RV function. This RV enlargement was a progression from previous.    Today the patient tells me  he's doing mostly well. No specific concerns today.  He is walking 1/2-1 block to get fresh air and moves his body. Feels well when he does this, no limtiations. Patient denies chest pain, pressure and tightness. No shortness of breath or dyspnea on exertion. No dizziness, lightheadedness, pre-syncope or syncope. No palpitations or racing heart. Sleeping well, urinates a few times per night but without orthopnea or PND. No leg swelling.  "Gets cramps in his legs if eh walks too much. No claudication symptoms. No blood in stool or urine. No fevers, chills or cough. Weight 140-142 lbs, rarely up to 144ls.            Data Review Today:     TTE February 8, 2024:  The visual ejection fraction is 55-60%.  Left ventricular systolic function is normal.  The right ventricle is mildly dilated.  The right ventricular systolic function is moderately reduced.  29mm Medtronic Evolut Pro TAVR valve in the aortic position.,  The gradient is normal for this prosthetic aortic valve.  Right ventricular systolic pressure is elevated, consistent with moderate  pulmonary hypertension.  The rhythm was atrial fibrillation.  The study was technically difficult. The study was technically limited.     Echocardiogram June 20, 2022:  Left ventricular systolic function is mildly reduced.  The visual ejection fraction is 45-50%.  There is a bioprosthetic aortic valve. 29mm Medtronic Evolut Pro, implanted  7/2020  The mean AoV pressure gradient is 9mmHg.  Right ventricular systolic pressure is elevated, consistent with mild  pulmonary hypertension.  Compared to 6/21, mean gradients across bioprosthetic aortic valve is  unchanged. EF is likley unchanged. The study was technically difficult          I have reviewed and updated the patient's past medical history, allergy list and medication list.          Physical Examination   Vitals: BP (!) 174/79   Pulse 50   Ht 1.702 m (5' 7\")   Wt 64.4 kg (142 lb)   SpO2 95%   BMI 22.24 kg/m      BMI= Body mass index is 22.24 kg/m .    Wt Readings from Last 3 Encounters:   04/08/25 64.4 kg (142 lb)   03/12/25 64.9 kg (143 lb)   01/28/25 63.5 kg (140 lb)       General :   Alert and oriented, in no acute distress.    HEENT:  Normocephalic and atraumatic. .    Neck: No JVP, carotid bruit or obvious thyromegaly.   Lungs:   Respirations unlabored. Clear bilaterally with no rales, rhonchi, or wheezes.     Cardiovascular:   Rhythm is regular. S1 " and S2 are normal. No significant murmur is present. Lower extremities demonstrate 1+  edema around ankles       Skin: Skin is warm, dry, and otherwise intact.   Neurologic: Gait not assessed. Mood and affect appropriate.           Medical History  Surgical History Family History Social History   Past Medical History:   Diagnosis Date     Allergic rhinitis, cause unspecified      Anemia, unspecified      Aortic stenosis S/P TAVR      Benign neoplasm of colon 1999    Ademonatous polyp     CHF 2nd to TAVR -- S/P Pacemaker 12/2020     CKD (chronic kidney disease) stage 3, GFR 30-59 ml/min (H) 05/12/2011     Community acquired pneumonia 09/29/2020     Coronary atherosclerosis of unspecified type of vessel, native or graft     s/p CABG 2002     Esophageal ulcer w UGI bleed 05/24/2020    Had EGD adn caurtery 5/24/20, colonoscopy with diverticulosis then     Hyperlipidemia LDL goal < 100      Hypertension goal BP (blood pressure) < 140/90      Localized osteoarthrosis not specified whether primary or secondary, lower leg     left knee     Mild persistent asthma without complication 01/11/2016     Moderate persistent asthma      JOSÉ ANTONIO (obstructive sleep apnea)      Persistent atrial fibrillation (H)      Unspecified hearing loss     Past Surgical History:   Procedure Laterality Date     CARDIAC SURGERY       COLONOSCOPY N/A 5/26/2020    Procedure: COLONOSCOPY;  Surgeon: Ignacio Fournier MD;  Location:  GI     CV ANGIOGRAM CORONARY GRAFT N/A 4/24/2020    Procedure: Angiogram Coronary Graft;  Surgeon: Addison Willard MD;  Location:  HEART CARDIAC CATH LAB     CV CORONARY ANGIOGRAM N/A 4/24/2020    Procedure: Coronary Angiogram;  Surgeon: Addison Willard MD;  Location:  HEART CARDIAC CATH LAB     CV RIGHT HEART CATH MEASUREMENTS RECORDED N/A 4/24/2020    Procedure: Right Heart Cath;  Surgeon: Addison Willard MD;  Location:  HEART CARDIAC CATH LAB     CV TRANSCATHETER AORTIC VALVE REPLACEMENT N/A  2020    Procedure: Transcatheter Aortic Valve Replacement;  Surgeon: Soraida Monet MD;  Location:  HEART CARDIAC CATH LAB     EP PACEMAKER N/A 7/15/2020    Procedure: EP Pacemaker;  Surgeon: Tommie Sauer MD;  Location:  HEART CARDIAC CATH LAB     HC ESOPHAGOSCOPY, DIAGNOSTIC      Dr. Dow, lower esophageal ring & mild gastritis     HERNIORRHAPHY INGUINAL Right 2016    Procedure: HERNIORRHAPHY INGUINAL;  Surgeon: Alexis Alexandra MD;  Location:  SD     HERNIORRHAPHY INGUINAL Left 2023    Procedure: LEFT GROIN EXPLORATION, SEGMENTAL SMALL BOWEL RESECTION, TRANSINGUINAL REPAIR OF INCARCERATED/STRANGULATED HERNIA;  Surgeon: Dwayne Russell MD;  Location:  OR     LAPAROSCOPIC CHOLECYSTECTOMY      for biliary sludge     REVERSE ARTHROPLASTY SHOULDER Right 2022    Procedure: RIGHT REVERSE SHOULDER ARTHROPLASTY, WITH OPEN REDUCTION INTERNAL FIXATION, WITH NO CUSTOM GUIDE;  Surgeon: Wiley Vargas MD;  Location:  OR     ZZC NONSPECIFIC PROCEDURE      Coronary artery bypass     ZZHC COLONOSCOPY THRU STOMA W BIOPSY/CAUTERY TUMOR/POLYP/LESION      Dr. Dow, Q 5 years     ZZHC COLONOSCOPY THRU STOMA W BIOPSY/CAUTERY TUMOR/POLYP/LESION      Dr. Dow, one adenomatous polyp    Family History   Problem Relation Age of Onset     C.A.D. Father      Cancer Mother         breast cancer,  of pneumonia     Cerebrovascular Disease Son      CABG Son      Family History Negative Child      Family History Negative Sister      Heart Disease Brother     Social History     Socioeconomic History     Marital status:      Spouse name: Kailey     Number of children: 3     Years of education: Not on file     Highest education level: Not on file   Occupational History     Occupation: [employer]     Employer: RETIRED   Tobacco Use     Smoking status: Former     Types: Cigarettes     Passive exposure: Never     Smokeless tobacco: Never     Tobacco  comments:     smoked for a week in 20's   Vaping Use     Vaping status: Never Used   Substance and Sexual Activity     Alcohol use: Not Currently     Alcohol/week: 0.0 standard drinks of alcohol     Drug use: No     Sexual activity: Yes     Partners: Female   Other Topics Concern      Service No     Blood Transfusions No     Caffeine Concern Not Asked     Occupational Exposure Not Asked     Hobby Hazards Not Asked     Sleep Concern Not Asked     Stress Concern Not Asked     Weight Concern Not Asked     Special Diet Not Asked     Back Care Not Asked     Exercise Yes     Bike Helmet Not Asked     Seat Belt Yes     Self-Exams No     Parent/sibling w/ CABG, MI or angioplasty before 65F 55M? Yes   Social History Narrative    Balanced Diet - Yes    Osteoporosis Preventative measures-  Dairy servings per day: 2 to 3 servings daily    Regular Exercise -  Yes Describe walks 1.5 mile daily     Dental Exam up - YES - Date: 2 years ago    Eye Exam - YES - Date: 1 year ago    Self Testicular Exam -  No, handout given    Do you have any concerns about STD's -  No    Abuse: Current or Past (Physical, Sexual or Emotional)- No    Do you feel safe in your environment - Yes    Guns stored in the home - Yes, locked    Sunscreen used - No    Seatbelts used - Yes    Lipids - YES - Date: 3-09    Glucose -  YES - Date: 3-09    Colon Cancer Screening - Colonoscopy 3-08(date completed)    Hemoccults - UNKNOWN    PSA - YES - Date: 11-07    Digital Rectal Exam - UNKNOWN    Immunizations reviewed and up to date - Yes, td 1-2005, had shingles vaccine    5-28-09  JANEY Patel [facility]                     Social Drivers of Health     Financial Resource Strain: Low Risk  (12/27/2023)    Financial Resource Strain      Within the past 12 months, have you or your family members you live with been unable to get utilities (heat, electricity) when it was really needed?: No   Food Insecurity: Low Risk  (12/27/2023)    Food Insecurity      Within the past  12 months, did you worry that your food would run out before you got money to buy more?: No      Within the past 12 months, did the food you bought just not last and you didn t have money to get more?: No   Transportation Needs: Low Risk  (12/27/2023)    Transportation Needs      Within the past 12 months, has lack of transportation kept you from medical appointments, getting your medicines, non-medical meetings or appointments, work, or from getting things that you need?: No   Physical Activity: Insufficiently Active (10/28/2020)    Exercise Vital Sign      Days of Exercise per Week: 4 days      Minutes of Exercise per Session: 30 min   Stress: Not on file   Social Connections: Unknown (3/19/2021)    Social Connection and Isolation Panel [NHANES]      Frequency of Communication with Friends and Family: More than three times a week      Frequency of Social Gatherings with Friends and Family: Not on file      Attends Hoahaoism Services: Not on file      Active Member of Clubs or Organizations: Not on file      Attends Club or Organization Meetings: Not on file      Marital Status:    Interpersonal Safety: Low Risk  (2/10/2025)    Interpersonal Safety      Do you feel physically and emotionally safe where you currently live?: Yes      Within the past 12 months, have you been hit, slapped, kicked or otherwise physically hurt by someone?: No      Within the past 12 months, have you been humiliated or emotionally abused in other ways by your partner or ex-partner?: No   Housing Stability: Low Risk  (12/27/2023)    Housing Stability      Do you have housing? : Yes      Are you worried about losing your housing?: No   Recent Concern: Housing Stability - High Risk (11/1/2023)    Housing Stability      Do you have housing? : No      Are you worried about losing your housing?: No          Medications  Allergies   Scheduled Meds:  Current Outpatient Medications   Medication Sig Dispense Refill     acetaminophen  (TYLENOL) 500 MG tablet Take 1,000 mg by mouth every 8 hours as needed       albuterol (PROAIR HFA/PROVENTIL HFA/VENTOLIN HFA) 108 (90 Base) MCG/ACT inhaler INHALE 1 TO 2 PUFFS BY MOUTH EVERY 4 TO 6 HOURS AS NEEDED 18 g 2     apixaban ANTICOAGULANT (ELIQUIS) 2.5 MG tablet Take 1 tablet (2.5 mg) by mouth 2 times daily. 90 tablet 1     aspirin (ASA) 81 MG EC tablet Take 81 mg by mouth 2 times daily 0700 1700       budesonide (PULMICORT) 0.5 MG/2ML neb solution Take 2 mLs (0.5 mg) by nebulization 2 times daily 120 mL 3     calcium carbonate 600 mg-vitamin D 400 units (CALTRATE) 600-400 MG-UNIT per tablet Take 1 tablet by mouth daily       hydrALAZINE (APRESOLINE) 25 MG tablet Take 1 tablet (25 mg) by mouth 3 times daily. 270 tablet 3     ipratropium - albuterol 0.5 mg/2.5 mg/3 mL (DUONEB) 0.5-2.5 (3) MG/3ML neb solution Inhale 1 vial (3 mLs) into the lungs 4 times daily 360 mL 11     isosorbide mononitrate (IMDUR) 60 MG 24 hr tablet Take 1 tablet (60 mg) by mouth daily. 90 tablet 3     magnesium chloride (MAG64) 535 (64 Mg) MG TBEC CR tablet Take 1 tablet (535 mg) by mouth 2 times daily. 180 tablet 3     metoprolol tartrate (LOPRESSOR) 25 MG tablet Take 1 tablet (25 mg) by mouth 2 times daily. 180 tablet 3     montelukast (SINGULAIR) 10 MG tablet TAKE 1 TABLET (10 MG) BY MOUTH DAILY. 90 tablet 1     multivitamin, therapeutic (THERA-VIT) TABS tablet Take 1 tablet by mouth 2 times daily       nitroGLYcerin (NITROSTAT) 0.4 MG sublingual tablet For chest pain place 1 tablet under the tongue every 5 minutes for 3 doses. If symptoms persist 5 minutes after 1st dose call 911. 12 tablet 0     pantoprazole (PROTONIX) 40 MG EC tablet TAKE 1 TABLET BEFORE MEALS TWICE A DAY Strength: 40 mg 180 tablet 1     simvastatin (ZOCOR) 40 MG tablet Take 1 tablet (40 mg) by mouth at bedtime. 90 tablet 3     torsemide (DEMADEX) 5 MG tablet Take 10 mg every morning and 5 mg every afternoon 270 tablet 3     triamcinolone (KENALOG) 0.1 %  external lotion Apply topically 2 times daily 60 mL 1    No Known Allergies      Lab Results    Chemistry/lipid CBC Cardiac Enzymes/BNP/TSH/INR   Lab Results   Component Value Date    CHOL 101 02/10/2025    HDL 41 02/10/2025    TRIG 84 02/10/2025    BUN 71.8 (H) 03/12/2025     03/12/2025    CO2 25 03/12/2025    Lab Results   Component Value Date    WBC 4.3 07/11/2024    HGB 10.9 (L) 07/11/2024    HCT 35.3 (L) 07/11/2024    MCV 66 (L) 07/11/2024     (L) 07/11/2024    @RESUFAST(BMP,CBC,BNP,TSH,  INR)@      25 minutes spent reviewing prior records (including documentation, laboratory studies, cardiac testing/imaging), history and physical exam, planning, and subsequent documentation.     The longitudinal plan of care for the diagnosis(es)/condition(s) as documented were addressed during this visit. Due to the added complexity in care, I will continue to support Shiraz in the subsequent management and with ongoing continuity of care.      This note has been dictated using voice recognition software. Any grammatical, typographical, or context distortions are unintentional and inherent to the software.    Melissa Cevallos PA-C, RD  General Cardiology           Thank you for allowing me to participate in the care of your patient.      Sincerely,     Melissa Cevallos PA-C     [facility]  cc:   Melissa Cevallos PA-C  [address] [place] [address]

## 2025-04-08 NOTE — PATIENT INSTRUCTIONS
It was great to see you today! Your Cardiology Team includes myself and Dr. Field.     Recommendations from today:  Reduce torsemide from 15 mg daily (3 tablets) to 10 mg daily (2 tablets)  Check weight daily  If weight is 144 lbs, take an extra tablet torsemide  If you take an extra tablet more than twice, please call and let me know  I will talk to my colleagues about your pulmonary hypertension and call you with what they say  Follow up in 4 months with me.     For scheduling questions, our 24 hours scheduling line is available at [phone].    To reach our nursing team, please call [phone]. Our nursing team is available 8-4:30 Monday-Friday. If you have a medical emergency, please call [phone].

## 2025-04-16 ENCOUNTER — TELEPHONE (OUTPATIENT)
Dept: PHARMACY | Facility: OTHER | Age: OVER 89
End: 2025-04-16
Payer: COMMERCIAL

## 2025-04-16 NOTE — TELEPHONE ENCOUNTER
MARGRET Recruitment: [facility] insurance     Referral outreach attempt #2 on April 16, 2025      Outcome: left voicemail- Call back number [phone]    MARGRET Soliz

## 2025-05-07 ENCOUNTER — APPOINTMENT (OUTPATIENT)
Dept: ULTRASOUND IMAGING | Facility: CLINIC | Age: OVER 89
DRG: 193 | End: 2025-05-07
Attending: INTERNAL MEDICINE
Payer: COMMERCIAL

## 2025-05-07 ENCOUNTER — APPOINTMENT (OUTPATIENT)
Dept: GENERAL RADIOLOGY | Facility: CLINIC | Age: OVER 89
DRG: 193 | End: 2025-05-07
Attending: EMERGENCY MEDICINE
Payer: COMMERCIAL

## 2025-05-07 ENCOUNTER — HOSPITAL ENCOUNTER (INPATIENT)
Facility: CLINIC | Age: OVER 89
End: 2025-05-07
Attending: EMERGENCY MEDICINE | Admitting: INTERNAL MEDICINE
Payer: COMMERCIAL

## 2025-05-07 ENCOUNTER — OFFICE VISIT (OUTPATIENT)
Dept: INTERPRETER SERVICES | Facility: CLINIC | Age: OVER 89
End: 2025-05-07
Payer: COMMERCIAL

## 2025-05-07 DIAGNOSIS — J18.9 COMMUNITY ACQUIRED PNEUMONIA OF RIGHT UPPER LOBE OF LUNG: ICD-10-CM

## 2025-05-07 DIAGNOSIS — I50.813 ACUTE ON CHRONIC RIGHT-SIDED CONGESTIVE HEART FAILURE (H): ICD-10-CM

## 2025-05-07 LAB
ALBUMIN SERPL BCG-MCNC: 4 G/DL (ref 3.5–5.2)
ALP SERPL-CCNC: 84 U/L (ref 40–150)
ALT SERPL W P-5'-P-CCNC: 13 U/L (ref 0–70)
ANION GAP SERPL CALCULATED.3IONS-SCNC: 12 MMOL/L (ref 7–15)
AST SERPL W P-5'-P-CCNC: 23 U/L (ref 0–45)
ATRIAL RATE - MUSE: 50 BPM
BASE EXCESS BLDV CALC-SCNC: 1 MMOL/L (ref -3–3)
BASOPHILS # BLD AUTO: 0 10E3/UL (ref 0–0.2)
BASOPHILS NFR BLD AUTO: 1 %
BILIRUB SERPL-MCNC: 0.6 MG/DL
BUN SERPL-MCNC: 59 MG/DL (ref 8–23)
CALCIUM SERPL-MCNC: 9.6 MG/DL (ref 8.8–10.4)
CHLORIDE SERPL-SCNC: 100 MMOL/L (ref 98–107)
CREAT SERPL-MCNC: 2.03 MG/DL (ref 0.67–1.17)
DIASTOLIC BLOOD PRESSURE - MUSE: NORMAL MMHG
EGFRCR SERPLBLD CKD-EPI 2021: 31 ML/MIN/1.73M2
EOSINOPHIL # BLD AUTO: 0.1 10E3/UL (ref 0–0.7)
EOSINOPHIL NFR BLD AUTO: 4 %
ERYTHROCYTE [DISTWIDTH] IN BLOOD BY AUTOMATED COUNT: 17.1 % (ref 10–15)
FLUAV RNA SPEC QL NAA+PROBE: NEGATIVE
FLUBV RNA RESP QL NAA+PROBE: NEGATIVE
GLUCOSE SERPL-MCNC: 102 MG/DL (ref 70–99)
HCO3 BLDV-SCNC: 26 MMOL/L (ref 21–28)
HCO3 SERPL-SCNC: 26 MMOL/L (ref 22–29)
HCT VFR BLD AUTO: 35.1 % (ref 40–53)
HGB BLD-MCNC: 10.6 G/DL (ref 13.3–17.7)
HOLD SPECIMEN: NORMAL
IMM GRANULOCYTES # BLD: 0 10E3/UL
IMM GRANULOCYTES NFR BLD: 0 %
INTERPRETATION ECG - MUSE: NORMAL
LACTATE BLD-SCNC: 0.5 MMOL/L (ref 0.7–2)
LYMPHOCYTES # BLD AUTO: 0.4 10E3/UL (ref 0.8–5.3)
LYMPHOCYTES NFR BLD AUTO: 15 %
MCH RBC QN AUTO: 19.9 PG (ref 26.5–33)
MCHC RBC AUTO-ENTMCNC: 30.2 G/DL (ref 31.5–36.5)
MCV RBC AUTO: 66 FL (ref 78–100)
MONOCYTES # BLD AUTO: 0.3 10E3/UL (ref 0–1.3)
MONOCYTES NFR BLD AUTO: 9 %
NEUTROPHILS # BLD AUTO: 2.1 10E3/UL (ref 1.6–8.3)
NEUTROPHILS NFR BLD AUTO: 71 %
NRBC # BLD AUTO: 0 10E3/UL
NRBC BLD AUTO-RTO: 0 /100
NT-PROBNP SERPL-MCNC: 5299 PG/ML (ref 0–1800)
P AXIS - MUSE: NORMAL DEGREES
PCO2 BLDV: 46 MM HG (ref 40–50)
PH BLDV: 7.37 [PH] (ref 7.32–7.43)
PLATELET # BLD AUTO: 125 10E3/UL (ref 150–450)
PO2 BLDV: 28 MM HG (ref 25–47)
POTASSIUM SERPL-SCNC: 5 MMOL/L (ref 3.4–5.3)
PR INTERVAL - MUSE: NORMAL MS
PROCALCITONIN SERPL IA-MCNC: 0.18 NG/ML
PROT SERPL-MCNC: 7.6 G/DL (ref 6.4–8.3)
QRS DURATION - MUSE: 182 MS
QT - MUSE: 528 MS
QTC - MUSE: 481 MS
R AXIS - MUSE: -53 DEGREES
RBC # BLD AUTO: 5.32 10E6/UL (ref 4.4–5.9)
RSV RNA SPEC NAA+PROBE: NEGATIVE
SAO2 % BLDV: 51 % (ref 70–75)
SARS-COV-2 RNA RESP QL NAA+PROBE: NEGATIVE
SODIUM SERPL-SCNC: 138 MMOL/L (ref 135–145)
SYSTOLIC BLOOD PRESSURE - MUSE: NORMAL MMHG
T AXIS - MUSE: 109 DEGREES
TROPONIN T SERPL HS-MCNC: 50 NG/L
TROPONIN T SERPL HS-MCNC: 52 NG/L
VENTRICULAR RATE- MUSE: 50 BPM
WBC # BLD AUTO: 2.9 10E3/UL (ref 4–11)

## 2025-05-07 PROCEDURE — 250N000009 HC RX 250: Performed by: EMERGENCY MEDICINE

## 2025-05-07 PROCEDURE — 93005 ELECTROCARDIOGRAM TRACING: CPT

## 2025-05-07 PROCEDURE — 85025 COMPLETE CBC W/AUTO DIFF WBC: CPT | Performed by: EMERGENCY MEDICINE

## 2025-05-07 PROCEDURE — 84484 ASSAY OF TROPONIN QUANT: CPT | Performed by: EMERGENCY MEDICINE

## 2025-05-07 PROCEDURE — 87154 CUL TYP ID BLD PTHGN 6+ TRGT: CPT | Performed by: EMERGENCY MEDICINE

## 2025-05-07 PROCEDURE — 250N000009 HC RX 250: Performed by: INTERNAL MEDICINE

## 2025-05-07 PROCEDURE — 99223 1ST HOSP IP/OBS HIGH 75: CPT | Performed by: INTERNAL MEDICINE

## 2025-05-07 PROCEDURE — 250N000011 HC RX IP 250 OP 636: Performed by: INTERNAL MEDICINE

## 2025-05-07 PROCEDURE — 87637 SARSCOV2&INF A&B&RSV AMP PRB: CPT | Performed by: EMERGENCY MEDICINE

## 2025-05-07 PROCEDURE — 99285 EMERGENCY DEPT VISIT HI MDM: CPT | Mod: 25

## 2025-05-07 PROCEDURE — 71046 X-RAY EXAM CHEST 2 VIEWS: CPT

## 2025-05-07 PROCEDURE — 96374 THER/PROPH/DIAG INJ IV PUSH: CPT

## 2025-05-07 PROCEDURE — G0378 HOSPITAL OBSERVATION PER HR: HCPCS

## 2025-05-07 PROCEDURE — 250N000013 HC RX MED GY IP 250 OP 250 PS 637: Performed by: INTERNAL MEDICINE

## 2025-05-07 PROCEDURE — 94640 AIRWAY INHALATION TREATMENT: CPT

## 2025-05-07 PROCEDURE — T1013 SIGN LANG/ORAL INTERPRETER: HCPCS | Mod: U3

## 2025-05-07 PROCEDURE — 93971 EXTREMITY STUDY: CPT | Mod: LT

## 2025-05-07 PROCEDURE — 84145 PROCALCITONIN (PCT): CPT | Performed by: INTERNAL MEDICINE

## 2025-05-07 PROCEDURE — 36415 COLL VENOUS BLD VENIPUNCTURE: CPT | Performed by: EMERGENCY MEDICINE

## 2025-05-07 PROCEDURE — 83880 ASSAY OF NATRIURETIC PEPTIDE: CPT | Performed by: EMERGENCY MEDICINE

## 2025-05-07 PROCEDURE — 82565 ASSAY OF CREATININE: CPT | Performed by: EMERGENCY MEDICINE

## 2025-05-07 PROCEDURE — 250N000011 HC RX IP 250 OP 636: Performed by: EMERGENCY MEDICINE

## 2025-05-07 PROCEDURE — 82803 BLOOD GASES ANY COMBINATION: CPT

## 2025-05-07 PROCEDURE — 87040 BLOOD CULTURE FOR BACTERIA: CPT | Performed by: EMERGENCY MEDICINE

## 2025-05-07 RX ORDER — LATANOPROST 50 UG/ML
1 SOLUTION/ DROPS OPHTHALMIC AT BEDTIME
Status: ACTIVE | OUTPATIENT
Start: 2025-05-07

## 2025-05-07 RX ORDER — AMOXICILLIN 250 MG
2 CAPSULE ORAL 2 TIMES DAILY PRN
Status: ACTIVE | OUTPATIENT
Start: 2025-05-07

## 2025-05-07 RX ORDER — SIMVASTATIN 40 MG
40 TABLET ORAL AT BEDTIME
Status: DISPENSED | OUTPATIENT
Start: 2025-05-07

## 2025-05-07 RX ORDER — PROCHLORPERAZINE MALEATE 5 MG/1
5 TABLET ORAL EVERY 6 HOURS PRN
Status: ACTIVE | OUTPATIENT
Start: 2025-05-07

## 2025-05-07 RX ORDER — PANTOPRAZOLE SODIUM 40 MG/1
40 TABLET, DELAYED RELEASE ORAL
Status: DISPENSED | OUTPATIENT
Start: 2025-05-07

## 2025-05-07 RX ORDER — AMOXICILLIN 250 MG
1 CAPSULE ORAL 2 TIMES DAILY PRN
Status: ACTIVE | OUTPATIENT
Start: 2025-05-07

## 2025-05-07 RX ORDER — HYDRALAZINE HYDROCHLORIDE 25 MG/1
25 TABLET, FILM COATED ORAL 3 TIMES DAILY
Status: DISPENSED | OUTPATIENT
Start: 2025-05-07

## 2025-05-07 RX ORDER — MONTELUKAST SODIUM 10 MG/1
10 TABLET ORAL DAILY
Status: DISPENSED | OUTPATIENT
Start: 2025-05-08

## 2025-05-07 RX ORDER — AZITHROMYCIN 250 MG/1
250 TABLET, FILM COATED ORAL DAILY
Status: DISPENSED | OUTPATIENT
Start: 2025-05-08 | End: 2025-05-12

## 2025-05-07 RX ORDER — NITROGLYCERIN 0.4 MG/1
0.4 TABLET SUBLINGUAL EVERY 5 MIN PRN
Status: ACTIVE | OUTPATIENT
Start: 2025-05-07

## 2025-05-07 RX ORDER — CEFTRIAXONE 2 G/1
2 INJECTION, POWDER, FOR SOLUTION INTRAMUSCULAR; INTRAVENOUS ONCE
Status: COMPLETED | OUTPATIENT
Start: 2025-05-07 | End: 2025-05-07

## 2025-05-07 RX ORDER — BRIMONIDINE TARTRATE AND TIMOLOL MALEATE 2; 5 MG/ML; MG/ML
1 SOLUTION OPHTHALMIC 2 TIMES DAILY
Status: ACTIVE | OUTPATIENT
Start: 2025-05-07

## 2025-05-07 RX ORDER — FUROSEMIDE 10 MG/ML
60 INJECTION INTRAMUSCULAR; INTRAVENOUS ONCE
Status: COMPLETED | OUTPATIENT
Start: 2025-05-07 | End: 2025-05-07

## 2025-05-07 RX ORDER — ISOSORBIDE MONONITRATE 60 MG/1
60 TABLET, EXTENDED RELEASE ORAL DAILY
Status: DISPENSED | OUTPATIENT
Start: 2025-05-08

## 2025-05-07 RX ORDER — ONDANSETRON 2 MG/ML
4 INJECTION INTRAMUSCULAR; INTRAVENOUS EVERY 6 HOURS PRN
Status: ACTIVE | OUTPATIENT
Start: 2025-05-07

## 2025-05-07 RX ORDER — LATANOPROST 50 UG/ML
1 SOLUTION/ DROPS OPHTHALMIC AT BEDTIME
Status: ON HOLD | COMMUNITY

## 2025-05-07 RX ORDER — BRIMONIDINE TARTRATE AND TIMOLOL MALEATE 2; 5 MG/ML; MG/ML
1 SOLUTION OPHTHALMIC 2 TIMES DAILY
Status: ON HOLD | COMMUNITY

## 2025-05-07 RX ORDER — ASPIRIN 81 MG/1
81 TABLET ORAL 2 TIMES DAILY
Status: DISPENSED | OUTPATIENT
Start: 2025-05-07

## 2025-05-07 RX ORDER — METOPROLOL TARTRATE 25 MG/1
25 TABLET, FILM COATED ORAL 2 TIMES DAILY
Status: DISPENSED | OUTPATIENT
Start: 2025-05-07

## 2025-05-07 RX ORDER — FUROSEMIDE 10 MG/ML
20 INJECTION INTRAMUSCULAR; INTRAVENOUS
Status: DISPENSED | OUTPATIENT
Start: 2025-05-07

## 2025-05-07 RX ORDER — IPRATROPIUM BROMIDE AND ALBUTEROL SULFATE 2.5; .5 MG/3ML; MG/3ML
3 SOLUTION RESPIRATORY (INHALATION)
Status: DISPENSED | OUTPATIENT
Start: 2025-05-07

## 2025-05-07 RX ORDER — ALBUTEROL SULFATE 90 UG/1
2 INHALANT RESPIRATORY (INHALATION) EVERY 4 HOURS PRN
Status: ACTIVE | OUTPATIENT
Start: 2025-05-07

## 2025-05-07 RX ORDER — CEFTRIAXONE 2 G/1
2 INJECTION, POWDER, FOR SOLUTION INTRAMUSCULAR; INTRAVENOUS EVERY 24 HOURS
Status: DISPENSED | OUTPATIENT
Start: 2025-05-08

## 2025-05-07 RX ORDER — IPRATROPIUM BROMIDE AND ALBUTEROL SULFATE 2.5; .5 MG/3ML; MG/3ML
SOLUTION RESPIRATORY (INHALATION)
Status: COMPLETED
Start: 2025-05-07 | End: 2025-05-07

## 2025-05-07 RX ORDER — ACETAMINOPHEN 500 MG
1000 TABLET ORAL EVERY 8 HOURS PRN
Status: DISPENSED | OUTPATIENT
Start: 2025-05-07

## 2025-05-07 RX ORDER — ONDANSETRON 4 MG/1
4 TABLET, ORALLY DISINTEGRATING ORAL EVERY 6 HOURS PRN
Status: ACTIVE | OUTPATIENT
Start: 2025-05-07

## 2025-05-07 RX ORDER — BUDESONIDE 0.5 MG/2ML
0.5 INHALANT ORAL 2 TIMES DAILY
Status: DISPENSED | OUTPATIENT
Start: 2025-05-07

## 2025-05-07 RX ORDER — AZITHROMYCIN MONOHYDRATE 500 MG/5ML
500 INJECTION, POWDER, LYOPHILIZED, FOR SOLUTION INTRAVENOUS ONCE
Status: COMPLETED | OUTPATIENT
Start: 2025-05-07 | End: 2025-05-07

## 2025-05-07 RX ADMIN — LATANOPROST 1 DROP: 50 SOLUTION/ DROPS OPHTHALMIC at 22:08

## 2025-05-07 RX ADMIN — AZITHROMYCIN MONOHYDRATE 500 MG: 500 INJECTION, POWDER, LYOPHILIZED, FOR SOLUTION INTRAVENOUS at 07:35

## 2025-05-07 RX ADMIN — IPRATROPIUM BROMIDE AND ALBUTEROL SULFATE 3 ML: .5; 3 SOLUTION RESPIRATORY (INHALATION) at 22:13

## 2025-05-07 RX ADMIN — IPRATROPIUM BROMIDE AND ALBUTEROL SULFATE 3 ML: .5; 3 SOLUTION RESPIRATORY (INHALATION) at 17:10

## 2025-05-07 RX ADMIN — BRIMONIDINE TARTRATE AND TIMOLOL MALEATE 1 DROP: 2; 5 SOLUTION OPHTHALMIC at 22:07

## 2025-05-07 RX ADMIN — APIXABAN 2.5 MG: 2.5 TABLET, FILM COATED ORAL at 19:59

## 2025-05-07 RX ADMIN — IPRATROPIUM BROMIDE AND ALBUTEROL SULFATE 3 ML: .5; 3 SOLUTION RESPIRATORY (INHALATION) at 12:15

## 2025-05-07 RX ADMIN — FUROSEMIDE 20 MG: 10 INJECTION, SOLUTION INTRAMUSCULAR; INTRAVENOUS at 17:10

## 2025-05-07 RX ADMIN — CEFTRIAXONE SODIUM 2 G: 2 INJECTION, POWDER, FOR SOLUTION INTRAMUSCULAR; INTRAVENOUS at 07:35

## 2025-05-07 RX ADMIN — SIMVASTATIN 40 MG: 40 TABLET, FILM COATED ORAL at 22:07

## 2025-05-07 RX ADMIN — FUROSEMIDE 60 MG: 10 INJECTION, SOLUTION INTRAMUSCULAR; INTRAVENOUS at 06:20

## 2025-05-07 RX ADMIN — ASPIRIN 81 MG: 81 TABLET, COATED ORAL at 19:59

## 2025-05-07 RX ADMIN — HYDRALAZINE HYDROCHLORIDE 25 MG: 25 TABLET ORAL at 14:12

## 2025-05-07 RX ADMIN — HYDRALAZINE HYDROCHLORIDE 25 MG: 25 TABLET ORAL at 19:59

## 2025-05-07 RX ADMIN — PANTOPRAZOLE SODIUM 40 MG: 40 TABLET, DELAYED RELEASE ORAL at 17:10

## 2025-05-07 ASSESSMENT — ACTIVITIES OF DAILY LIVING (ADL)
ADLS_ACUITY_SCORE: 55
ADLS_ACUITY_SCORE: 53
ADLS_ACUITY_SCORE: 55
ADLS_ACUITY_SCORE: 53
ADLS_ACUITY_SCORE: 55
ADLS_ACUITY_SCORE: 53
ADLS_ACUITY_SCORE: 55
ADLS_ACUITY_SCORE: 55
ADLS_ACUITY_SCORE: 52
ADLS_ACUITY_SCORE: 55

## 2025-05-07 ASSESSMENT — COLUMBIA-SUICIDE SEVERITY RATING SCALE - C-SSRS
2. HAVE YOU ACTUALLY HAD ANY THOUGHTS OF KILLING YOURSELF IN THE PAST MONTH?: NO
1. IN THE PAST MONTH, HAVE YOU WISHED YOU WERE DEAD OR WISHED YOU COULD GO TO SLEEP AND NOT WAKE UP?: NO
6. HAVE YOU EVER DONE ANYTHING, STARTED TO DO ANYTHING, OR PREPARED TO DO ANYTHING TO END YOUR LIFE?: NO

## 2025-05-07 NOTE — ED NOTES
Patient on the call light, thought he lost his phone, and noted by staff to be on his right side by his hip.

## 2025-05-07 NOTE — H&P
[facility]    History and Physical - Hospitalist Service       Date of Admission:  5/7/2025    Assessment & Plan      Shiraz Ingram is a 89 year old male admitted on 5/7/2025.   Shiraz Ingram is a 89 year old male with history of aortic stenosis, status post TAVR, CAD status post CABG in 2002, chronic  diastolic heart failure, history of MAYA thrombus, pulmonary hypertension history of PPM, hypertension, chronic atrial fibrillation, chronic kidney disease stage III, chronic thrombocytopenia, PUD, hyperlipidemia, who presents with cough, dyspnea and lower extremity swelling(more than left compared to right) for last 5 days      Right upper lobe community-acquired pneumonia  Acute on chronic diastolic congestive heart failure exacerbation  Chronic left pleural effusion  Cough with dyspnea, likely multifactorial from above  Bilateral lower extremity edema,  left more than right  - Chest x-ray with mildly enlarged cardiac silhouette, persistent left lung base airspace opacification and pleural effusion, new airspace opacification in the right upper lobe concerning for pneumonia  -WBC count around his baseline, viral panel negative, procalcitonin 0.18  - Received 60 mg of IV Lasix in the ED, will continue with 20 mg IV Lasix twice daily while monitoring his renal function  - Hold PTA diuresis while getting IV diuretics  - Strict I's and O's, daily weight  - Also started on ceftriaxone and azithromycin in the ED, will continue  - Follow blood culture  - Given left lower extremity is more swollen than right will do left lower extremity ultrasound to rule out DVT  -Monitor in telemetry      History of aortic stenosis status post TAVR  Chronic atrial fibrillation  History of MAYA thrombus  Pulmonary hypertension  History of PPM  History of CAD status post CABG 2002  Chronically elevated troponin  -Patient has chronically elevated troponin in the 50s.  Currently denies any chest pain.  Troponin is  flat  - Monitor in telemetry  - Resume PTA hydralazine, nitrate, beta-blocker with holding parameters, resume PTA statin  - Resume PTA Eliquis and aspirin    Chronic kidney disease stage IIIb  - Baseline creatinine around 2, creatinine currently at baseline  - Monitor closely with diuresis  - Avoid nephrotoxins    Peptic ulcer disease  - Resume PTA PPIs    Chronic pancytopenia  - Heme labs around his baseline, no history of active bleeding  - Outpatient follow-up        Diet:  Cardiac with fluid restriction  DVT Prophylaxis: DOAC  Pak Catheter: Not present  Lines: None     Cardiac Monitoring: None  Code Status:  DNR/DNI.  Communicated with the patient with the help of  with son at the bedside    Clinically Significant Risk Factors Present on Admission                # Drug Induced Coagulation Defect: home medication list includes an anticoagulant medication  # Drug Induced Platelet Defect: home medication list includes an antiplatelet medication   # Hypertension: Noted on problem list  # Acute heart failure with preserved ejection fraction: heart failure noted on problem list, last echo with EF >50%, and receiving IV diuretics     # Anemia: based on hgb <11           # Asthma: noted on problem list  # Pacemaker present       Disposition Plan     Medically Ready for Discharge: Anticipated Tomorrow pending improvement in dyspnea and continued clinical improvement           Tiffany Humphrey MD  Hospitalist Service  [facility]  Securely message with [employer] (more info)  Text page via [employer] Paging/Directory     ______________________________________________________________________    Chief Complaint   Dyspnea, cough, lower extremity swelling    History is obtained from the patient with the help of , electronic health record, and emergency department physician    History of Present Illness   Shiraz Ingram is a 89 year old male with history of aortic  stenosis, status post TAVR, chronic atherosclerosis status post CABG in 2002, chronic  diastolic heart failure, history of MAYA thrombus, pulmonary hypertension history of PPM, hypertension, chronic atrial fibrillation, chronic kidney disease stage III, chronic thrombocytopenia, PUD, hyperlipidemia, who presents with cough, dyspnea and lower extremity swelling(more than left compared to right) for last 5 days    Patient was in his usual state of health about 5 days ago.  5 days ago he had chills and gradually started having worsening dyspnea and cough with yellow sputum production.  Denies any fever, denies any sick contacts.  He also started noticing bilateral lower extremity swelling, more on the left compared to right.  However he does report his left lower extremity is normally more swollen than right lower extremity.  Denies any orthopnea or PND      Past Medical History    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    CKD (chronic kidney disease) stage 3, GFR 30-59 ml/min (H) 05/12/2011    Community acquired pneumonia 09/29/2020    Coronary atherosclerosis of unspecified type of vessel, native or graft     s/p CABG 2002    Esophageal ulcer w UGI bleed 05/24/2020    Had EGD adn caurtery 5/24/20, colonoscopy with diverticulosis then    Hyperlipidemia LDL goal < 100     Hypertension goal BP (blood pressure) < 140/90     Localized osteoarthrosis not specified whether primary or secondary, lower leg     left knee    Mild persistent asthma without complication 01/11/2016    Moderate persistent asthma     JOSÉ ANTONIO (obstructive sleep apnea)     Persistent atrial fibrillation (H)     Unspecified hearing loss        Past Surgical History   Past Surgical History:   Procedure Laterality Date    CARDIAC SURGERY      COLONOSCOPY N/A 5/26/2020     Procedure: COLONOSCOPY;  Surgeon: Ignacio Fournier MD;  Location:  GI    CV ANGIOGRAM CORONARY GRAFT N/A 4/24/2020    Procedure: Angiogram Coronary Graft;  Surgeon: Addison Willard MD;  Location:  HEART CARDIAC CATH LAB    CV CORONARY ANGIOGRAM N/A 4/24/2020    Procedure: Coronary Angiogram;  Surgeon: Addison Willard MD;  Location:  HEART CARDIAC CATH LAB    CV RIGHT HEART CATH MEASUREMENTS RECORDED N/A 4/24/2020    Procedure: Right Heart Cath;  Surgeon: Addison Willard MD;  Location:  HEART CARDIAC CATH LAB    CV TRANSCATHETER AORTIC VALVE REPLACEMENT N/A 7/14/2020    Procedure: Transcatheter Aortic Valve Replacement;  Surgeon: Soraida Monet MD;  Location:  HEART CARDIAC CATH LAB    EP PACEMAKER N/A 7/15/2020    Procedure: EP Pacemaker;  Surgeon: Tommie Sauer MD;  Location:  HEART CARDIAC CATH LAB    HC ESOPHAGOSCOPY, DIAGNOSTIC  5/03    Dr. Dow, lower esophageal ring & mild gastritis    HERNIORRHAPHY INGUINAL Right 9/26/2016    Procedure: HERNIORRHAPHY INGUINAL;  Surgeon: Alexis Alexandra MD;  [facility]: [facility]    HERNIORRHAPHY INGUINAL Left 1/13/2023    Procedure: LEFT GROIN EXPLORATION, SEGMENTAL SMALL BOWEL RESECTION, TRANSINGUINAL REPAIR OF INCARCERATED/STRANGULATED HERNIA;  Surgeon: Dwayne Russell MD;  Location:  OR    LAPAROSCOPIC CHOLECYSTECTOMY  12/03    for biliary sludge    REVERSE ARTHROPLASTY SHOULDER Right 2/25/2022    Procedure: RIGHT REVERSE SHOULDER ARTHROPLASTY, WITH OPEN REDUCTION INTERNAL FIXATION, WITH NO CUSTOM GUIDE;  Surgeon: Wiley Vargas MD;  Location:  OR    Z NONSPECIFIC PROCEDURE  5/02    Coronary artery bypass    [facility] W BIOPSY/CAUTERY TUMOR/POLYP/LESION  5/03    Dr. Dow, Q 5 years    ZZ COLONOSCOPY THRU STOMA W BIOPSY/CAUTERY TUMOR/POLYP/LESION  12/199    Dr. Dow, one adenomatous polyp       Prior to Admission Medications   Prior to Admission Medications   Prescriptions  Last Dose Informant Patient Reported? Taking?   acetaminophen (TYLENOL) 500 MG tablet  Self Yes No   Sig: Take 1,000 mg by mouth every 8 hours as needed   albuterol (PROAIR HFA/PROVENTIL HFA/VENTOLIN HFA) 108 (90 Base) MCG/ACT inhaler   No No   Sig: INHALE 1 TO 2 PUFFS BY MOUTH EVERY 4 TO 6 HOURS AS NEEDED   apixaban ANTICOAGULANT (ELIQUIS) 2.5 MG tablet   No No   Sig: Take 1 tablet (2.5 mg) by mouth 2 times daily.   aspirin (ASA) 81 MG EC tablet  Self Yes No   Sig: Take 81 mg by mouth 2 times daily 0700 1700   budesonide (PULMICORT) 0.5 MG/2ML neb solution   No No   Sig: Take 2 mLs (0.5 mg) by nebulization 2 times daily   calcium carbonate 600 mg-vitamin D 400 units (CALTRATE) 600-400 MG-UNIT per tablet  Self Yes No   Sig: Take 1 tablet by mouth daily   hydrALAZINE (APRESOLINE) 25 MG tablet   No No   Sig: Take 1 tablet (25 mg) by mouth 3 times daily.   ipratropium - albuterol 0.5 mg/2.5 mg/3 mL (DUONEB) 0.5-2.5 (3) MG/3ML neb solution   No No   Sig: Inhale 1 vial (3 mLs) into the lungs 4 times daily   isosorbide mononitrate (IMDUR) 60 MG 24 hr tablet   No No   Sig: Take 1 tablet (60 mg) by mouth daily.   magnesium chloride (MAG64) 535 (64 Mg) MG TBEC CR tablet   No No   Sig: Take 1 tablet (535 mg) by mouth 2 times daily.   metoprolol tartrate (LOPRESSOR) 25 MG tablet   No No   Sig: Take 1 tablet (25 mg) by mouth 2 times daily.   montelukast (SINGULAIR) 10 MG tablet   No No   Sig: TAKE 1 TABLET (10 MG) BY MOUTH DAILY.   multivitamin, therapeutic (THERA-VIT) TABS tablet  Self Yes No   Sig: Take 1 tablet by mouth 2 times daily   nitroGLYcerin (NITROSTAT) 0.4 MG sublingual tablet   No No   Sig: For chest pain place 1 tablet under the tongue every 5 minutes for 3 doses. If symptoms persist 5 minutes after 1st dose call 911.   pantoprazole (PROTONIX) 40 MG EC tablet   No No   Sig: TAKE 1 TABLET BEFORE MEALS TWICE A DAY Strength: 40 mg   simvastatin (ZOCOR) 40 MG tablet   No No   Sig: Take 1 tablet (40 mg) by mouth at  bedtime.   torsemide (DEMADEX) 5 MG tablet   No No   Sig: Take 10 mg every morning and 5 mg every afternoon   triamcinolone (KENALOG) 0.1 % external lotion   No No   Sig: Apply topically 2 times daily      Facility-Administered Medications: None        Review of Systems    The 10 point Review of Systems is negative other than noted in the HPI or here.      Physical Exam   Vital Signs: Temp: 97.1  F (36.2  C) Temp src: Tympanic BP: (!) 166/68 Pulse: (!) 49   Resp: 14 SpO2: 94 % O2 Device: None (Room air)    Weight: 142 lbs 0 oz    Exam:  Constitutional: Awake, alert and no distress. Appears comfortable  Head: Normocephalic. No masses, lesions, tenderness or abnormalities  ENMT: ENT exam normal, no neck nodes or sinus tenderness  Cardiovascular: Regular, bradycardic, 2+ murmurs, no rubs or JVD  Respiratory: Normal WOB, decreased air entry on left base, occasional scattered crackles  Gastrointestinal: Abdomen soft, non-tender. BS normal. No masses, organomegaly  : Deferred  Extremities : 3+ pitting edema on left LE up to mid calf, 3+ edema on right lower extremity up to ankle, no clubbing or cyanosis      Medical Decision Making       78 MINUTES SPENT BY ME on the date of service doing chart review, history, exam, documentation & further activities per the note.      Data     I have personally reviewed the following data over the past 24 hrs:    2.9 (L)  \   10.6 (L)   / 125 (L)     138 100 59.0 (H) /  102 (H)   5.0 26 2.03 (H) \     ALT: 13 AST: 23 AP: 84 TBILI: 0.6   ALB: 4.0 TOT PROTEIN: 7.6 LIPASE: N/A     Trop: 50 (H) BNP: 5,299 (H)     Procal: 0.18 CRP: N/A Lactic Acid: 0.5 (L)         Imaging results reviewed over the past 24 hrs:   Recent Results (from the past 24 hours)   XR Chest 2 Views    Narrative    EXAM: XR CHEST 2 VIEWS  LOCATION: [facility]  DATE: 5/7/2025    INDICATION: sob  COMPARISON: 2/8/2024      Impression    IMPRESSION: Mildly enlarged cardiac silhouette status post  sternotomy and TAVR. Left-sided cardiac pacer and leads are stable. Slightly improved aeration of the left lung base with persistent airspace opacification and pleural effusion. There is new   airspace opacification in the right upper lobe concerning for pneumonia. No pneumothorax. Partially imaged right shoulder arthroplasty.     Recent Labs   Lab 05/07/25  0530   WBC 2.9*   HGB 10.6*   MCV 66*   *      POTASSIUM 5.0   CHLORIDE 100   CO2 26   BUN 59.0*   CR 2.03*   ANIONGAP 12   AYALA 9.6   *   ALBUMIN 4.0   PROTTOTAL 7.6   BILITOTAL 0.6   ALKPHOS 84   ALT 13   AST 23

## 2025-05-07 NOTE — ED TRIAGE NOTES
Patient here with increase  shortness of breath  and bilateral leg swelling. He stated he has been short of breath for 4 days. He c/o coughing but no chest pain     Triage Assessment (Adult)       Row Name 05/07/25 0502          Triage Assessment    Airway WDL WDL        Respiratory WDL    Respiratory WDL WDL        Skin Circulation/Temperature WDL    Skin Circulation/Temperature WDL WDL        Cardiac WDL    Cardiac WDL WDL        Peripheral/Neurovascular WDL    Peripheral Neurovascular WDL WDL        Cognitive/Neuro/Behavioral WDL    Cognitive/Neuro/Behavioral WDL WDL

## 2025-05-07 NOTE — ED NOTES
[facility]  ED Nurse Handoff Report    ED Chief complaint: Shortness of Breath      ED Diagnosis:   Final diagnoses:   None       Code Status: to be addressed     Allergies: No Known Allergies    Patient Story: Patient here with increase shortness of breath and bilateral leg swelling. He stated he has been short of breath for 4 days. He c/o coughing but no chest pain   Focused Assessment:  ALS , Aox4, dyspnea on exertion, bradycardic in the 40-50s, ventricular paced rhythm, intermittent cough    Treatments and/or interventions provided: PIV, labs, imaging, meds  Patient's response to treatments and/or interventions: no change  XR Chest 2 Views   Final Result   IMPRESSION: Mildly enlarged cardiac silhouette status post sternotomy and TAVR. Left-sided cardiac pacer and leads are stable. Slightly improved aeration of the left lung base with persistent airspace opacification and pleural effusion. There is new    airspace opacification in the right upper lobe concerning for pneumonia. No pneumothorax. Partially imaged right shoulder arthroplasty.        Labs Ordered and Resulted from Time of ED Arrival to Time of ED Departure   COMPREHENSIVE METABOLIC PANEL - Abnormal       Result Value    Sodium 138      Potassium 5.0      Carbon Dioxide (CO2) 26      Anion Gap 12      Urea Nitrogen 59.0 (*)     Creatinine 2.03 (*)     GFR Estimate 31 (*)     Calcium 9.6      Chloride 100      Glucose 102 (*)     Alkaline Phosphatase 84      AST 23      ALT 13      Protein Total 7.6      Albumin 4.0      Bilirubin Total 0.6     TROPONIN T, HIGH SENSITIVITY - Abnormal    Troponin T, High Sensitivity 52 (*)    NT PROBNP INPATIENT - Abnormal    N terminal Pro BNP Inpatient 5,299 (*)    CBC WITH PLATELETS AND DIFFERENTIAL - Abnormal    WBC Count 2.9 (*)     RBC Count 5.32      Hemoglobin 10.6 (*)     Hematocrit 35.1 (*)     MCV 66 (*)     MCH 19.9 (*)     MCHC 30.2 (*)     RDW 17.1 (*)     Platelet Count 125 (*)      % Neutrophils 71      % Lymphocytes 15      % Monocytes 9      % Eosinophils 4      % Basophils 1      % Immature Granulocytes 0      NRBCs per 100 WBC 0      Absolute Neutrophils 2.1      Absolute Lymphocytes 0.4 (*)     Absolute Monocytes 0.3      Absolute Eosinophils 0.1      Absolute Basophils 0.0      Absolute Immature Granulocytes 0.0      Absolute NRBCs 0.0     ISTAT GASES LACTATE VENOUS POCT - Abnormal    Lactic Acid POCT 0.5 (*)     Bicarbonate Venous POCT 26      O2 Sat, Venous POCT 51 (*)     pCO2 Venous POCT 46      pH Venous POCT 7.37      pO2 Venous POCT 28      Base Excess/Deficit (+/-) POCT 1.0     INFLUENZA A/B, RSV AND SARS-COV2 PCR   TROPONIN T, HIGH SENSITIVITY   BLOOD CULTURE   BLOOD CULTURE       To be done/followed up on inpatient unit:  IV abx    Does this patient have any cognitive concerns?: none    Activity level - Baseline/Home:  Independent  Activity Level - Current:   Stand with Assist    Patient's Preferred language: American Sign Language   Needed?: Yes    Isolation: COVID r/o and special precautions  Infection: COVID r/o and special precautions  Patient tested for COVID 19 prior to admission: YES  Bariatric?: No    Vital Signs:   Vitals:    05/07/25 0508   BP: (!) 166/68   Pulse: 50   Resp: 18   Temp: 97.1  F (36.2  C)   TempSrc: Tympanic   SpO2: 98%       Cardiac Rhythm:Cardiac Rhythm: Ventricular paced    Was the PSS-3 completed:   Yes  What interventions are required if any?               Family Comments: Son at bedside       For the majority of the shift this patient's behavior was Green.   Behavioral interventions performed were n/a.    ED NURSE PHONE NUMBER: *[phone]

## 2025-05-07 NOTE — ED NOTES
Patient done with nebulizer, turned off, patient was informed that dinner will be up soon and will bring it in when ready.

## 2025-05-07 NOTE — PHARMACY-ADMISSION MEDICATION HISTORY
Pharmacist Admission Medication History    Admission medication history is complete. The information provided in this note is only as accurate as the sources available at the time of the update.    Information Source(s): Patient, Family member, and CareEverywhere/SureScripts via in-person    Changes made to PTA medication list:  Added: latanoprost, brimonidine/timolol eye drops   Deleted: None  Changed: None    Allergies reviewed with patient and updates made in EHR: no    Medication History Completed By: Sloane Arreaga RPH 5/7/2025 8:18 AM    PTA Med List   Medication Sig Last Dose/Taking    acetaminophen (TYLENOL) 500 MG tablet Take 1,000 mg by mouth every 8 hours as needed Taking As Needed    albuterol (PROAIR HFA/PROVENTIL HFA/VENTOLIN HFA) 108 (90 Base) MCG/ACT inhaler INHALE 1 TO 2 PUFFS BY MOUTH EVERY 4 TO 6 HOURS AS NEEDED Taking    apixaban ANTICOAGULANT (ELIQUIS) 2.5 MG tablet Take 1 tablet (2.5 mg) by mouth 2 times daily. 5/7/2025 Morning    aspirin (ASA) 81 MG EC tablet Take 81 mg by mouth 2 times daily. 5/7/2025 Morning    brimonidine-timolol (COMBIGAN) 0.2-0.5 % ophthalmic solution Place 1 drop Into the left eye 2 times daily. 5/7/2025 Morning    budesonide (PULMICORT) 0.5 MG/2ML neb solution Take 2 mLs (0.5 mg) by nebulization 2 times daily 5/7/2025 Morning    calcium carbonate 600 mg-vitamin D 400 units (CALTRATE) 600-400 MG-UNIT per tablet Take 1 tablet by mouth daily 5/7/2025 Morning    hydrALAZINE (APRESOLINE) 25 MG tablet Take 1 tablet (25 mg) by mouth 3 times daily. 5/7/2025 Morning    ipratropium - albuterol 0.5 mg/2.5 mg/3 mL (DUONEB) 0.5-2.5 (3) MG/3ML neb solution Inhale 1 vial (3 mLs) into the lungs 4 times daily 5/7/2025 Morning    isosorbide mononitrate (IMDUR) 60 MG 24 hr tablet Take 1 tablet (60 mg) by mouth daily. 5/7/2025 Morning    latanoprost (XALATAN) 0.005 % ophthalmic solution Place 1 drop into both eyes at bedtime. 5/6/2025 Bedtime    magnesium chloride (MAG64) 535 (64 Mg) MG  TBEC CR tablet Take 1 tablet (535 mg) by mouth 2 times daily. 5/7/2025 Morning    metoprolol tartrate (LOPRESSOR) 25 MG tablet Take 1 tablet (25 mg) by mouth 2 times daily. 5/7/2025 Morning    montelukast (SINGULAIR) 10 MG tablet TAKE 1 TABLET (10 MG) BY MOUTH DAILY. 5/7/2025 Morning    multivitamin, therapeutic (THERA-VIT) TABS tablet Take 1 tablet by mouth 2 times daily 5/7/2025 Morning    nitroGLYcerin (NITROSTAT) 0.4 MG sublingual tablet For chest pain place 1 tablet under the tongue every 5 minutes for 3 doses. If symptoms persist 5 minutes after 1st dose call 911. Taking    pantoprazole (PROTONIX) 40 MG EC tablet TAKE 1 TABLET BEFORE MEALS TWICE A DAY Strength: 40 mg 5/7/2025 Morning    simvastatin (ZOCOR) 40 MG tablet Take 1 tablet (40 mg) by mouth at bedtime. 5/6/2025 Bedtime    torsemide (DEMADEX) 5 MG tablet Take 10 mg every morning and 5 mg every afternoon 5/7/2025 Morning    triamcinolone (KENALOG) 0.1 % external lotion Apply topically 2 times daily Taking

## 2025-05-07 NOTE — ED PROVIDER NOTES
"  Emergency Department Note      History of Present Illness     Chief Complaint   Shortness of Breath      HPI sign language interpretor used.  Shiraz Ingram is a 89 year old male on Eliquis with a history of CHF, hypertension, atrial fibrillation who presents with shortness of breath. For the past 4 days the patient has been experiencing shortness of breath, slight leg swelling, productive cough. He has not missed any doses of his medications. He denies any fever, chest pain, vomiting, abdominal pain, urinary problems, diarrhea.     Independent Historian   None    Review of External Notes   I reviewed his office visit on March 12 of this year.    Past Medical History     Medical History and Problem List   Anemia   Aortic stenosis   Benign neoplasm of colon   CHF  CKD  CAD  Esophageal ulcer with UGI bleed   Hyperlipidemia   Hypertension   Asthma   JOSÉ ANTONIO  Atrial fibrillation   Pneumonia   SBO    Medications   Albuterol   Eliquis   Aspirin   Pulmicort   Caltrate   Apresoline   Duoneb   Lopressor   Singulair   Protonix  Demadex   Zocor   Nitrostat   Imdur     Surgical History   Cardiac surgery   Angiogram coronary graft   Esophagoscopy   Herniorrhaphy inguinal   Laparoscopic cholecystectomy   Reverse arthroplasty shoulder     Physical Exam     Patient Vitals for the past 24 hrs:   BP Temp Temp src Pulse Resp SpO2 Height Weight   05/07/25 0722 -- -- -- -- -- -- 1.702 m (5' 7\") 64.4 kg (142 lb)   05/07/25 0700 -- -- -- (!) 49 14 94 % -- --   05/07/25 0508 (!) 166/68 97.1  F (36.2  C) Tympanic 50 18 98 % -- --     Physical Exam  Constitutional: Vital signs reviewed as above  General: Alert  HEENT: Moist mucous membranes  Eyes: Conjunctiva normal.   Neck: Normal range of motion  Cardiovascular: Bradycardic, Regular rhythm and normal heart sounds.  No MRG.  3+ edema to the knees bilaterally.  Pulmonary/Chest: Effort normal and breath sounds normal. No respiratory distress. Patient has no wheezes. Patient has no rales. "   Abdominal: Soft. Positive bowel sounds. No MRG.  Musculoskeletal/Extremities: Full ROM.  Endo: No pitting edema  Neurological: Alert, no focal deficits.  Skin: Skin is warm and dry.   Psychiatric: Pleasant    Diagnostics     Lab Results   Labs Ordered and Resulted from Time of ED Arrival to Time of ED Departure   COMPREHENSIVE METABOLIC PANEL - Abnormal       Result Value    Sodium 138      Potassium 5.0      Carbon Dioxide (CO2) 26      Anion Gap 12      Urea Nitrogen 59.0 (*)     Creatinine 2.03 (*)     GFR Estimate 31 (*)     Calcium 9.6      Chloride 100      Glucose 102 (*)     Alkaline Phosphatase 84      AST 23      ALT 13      Protein Total 7.6      Albumin 4.0      Bilirubin Total 0.6     TROPONIN T, HIGH SENSITIVITY - Abnormal    Troponin T, High Sensitivity 52 (*)    NT PROBNP INPATIENT - Abnormal    N terminal Pro BNP Inpatient 5,299 (*)    CBC WITH PLATELETS AND DIFFERENTIAL - Abnormal    WBC Count 2.9 (*)     RBC Count 5.32      Hemoglobin 10.6 (*)     Hematocrit 35.1 (*)     MCV 66 (*)     MCH 19.9 (*)     MCHC 30.2 (*)     RDW 17.1 (*)     Platelet Count 125 (*)     % Neutrophils 71      % Lymphocytes 15      % Monocytes 9      % Eosinophils 4      % Basophils 1      % Immature Granulocytes 0      NRBCs per 100 WBC 0      Absolute Neutrophils 2.1      Absolute Lymphocytes 0.4 (*)     Absolute Monocytes 0.3      Absolute Eosinophils 0.1      Absolute Basophils 0.0      Absolute Immature Granulocytes 0.0      Absolute NRBCs 0.0     ISTAT GASES LACTATE VENOUS POCT - Abnormal    Lactic Acid POCT 0.5 (*)     Bicarbonate Venous POCT 26      O2 Sat, Venous POCT 51 (*)     pCO2 Venous POCT 46      pH Venous POCT 7.37      pO2 Venous POCT 28      Base Excess/Deficit (+/-) POCT 1.0     INFLUENZA A/B, RSV AND SARS-COV2 PCR - Normal    Influenza A PCR Negative      Influenza B PCR Negative      RSV PCR Negative      SARS CoV2 PCR Negative     TROPONIN T, HIGH SENSITIVITY   BLOOD CULTURE   BLOOD CULTURE        Imaging   XR Chest 2 Views   Final Result   IMPRESSION: Mildly enlarged cardiac silhouette status post sternotomy and TAVR. Left-sided cardiac pacer and leads are stable. Slightly improved aeration of the left lung base with persistent airspace opacification and pleural effusion. There is new    airspace opacification in the right upper lobe concerning for pneumonia. No pneumothorax. Partially imaged right shoulder arthroplasty.          EKG   ECG results from 05/07/25   EKG 12-lead, tracing only     Value    Systolic Blood Pressure     Diastolic Blood Pressure     Ventricular Rate 50    Atrial Rate 50    MI Interval     QRS Duration 182        QTc 481    P Axis     R AXIS -53    T Axis 109    Interpretation ECG      Ventricular-paced rhythm  Abnormal ECG  When compared with ECG of 08-Feb-2024 02:03,  No significant changes noted        Independent Interpretation   X-ray chest shows cardiomegaly, opacity of the right upper lobe concerning for pneumonia, pulmonary vascular congestion, left pleural effusion    ED Course      Medications Administered   Medications   cefTRIAXone (ROCEPHIN) 2 g vial to attach to  ml bag for ADULTS or NS 50 ml bag for PEDS (has no administration in time range)   azithromycin (ZITHROMAX) 500 mg vial to attach to  mL bag (has no administration in time range)   furosemide (LASIX) injection 60 mg (60 mg Intravenous $Given 5/7/25 0620)       Procedures   Procedures     Discussion of Management   See ED course     ED Course   ED Course as of 05/07/25 0734   Wed May 07, 2025   0606 I initially assessed the patient and obtained the above history and physical exam.    0702 I rechecked the patient    0724 I talked with Dr. Humphrey about the patient and plan of care     Additional Documentation  None    Medical Decision Making / Diagnosis     CMS Diagnoses: Patient was given Rocephin and azithromycin for probable right upper lobe pneumonia.  Lactate was normal.  He is afebrile.   He is currently not showing signs of severe sepsis or septic shock.  Blood cultures were administered prior to antibiotics and we will continue to monitor for signs of sepsis.    MIPS       None    MDM   Shiraz Ingram is a 89 year old male who presents emergency department with shortness of breath and productive cough.  He is afebrile here.  He does have history of CHF.  He does take his medications routinely.  He does have a history of complete heart block and has a pacer.  Denies any chest pain.  He does have marked swelling to the bilateral lower extremities.  He also has some crackles at the bases.  X-ray does reveal left-sided pleural effusion as well as a new right upper lobe opacity concerning for pneumonia.  He is afebrile with a normal lactate.  He is pancytopenic which is not unusual for him.  EKG showed paced rhythm, initial Trope was 52 which is near his baseline.  Again BNP came back elevated almost 5300.  He was given 60 of Lasix IV.  He has not been hypoxic here.  His creatinine here is 2.03 which is baseline.  COVID/RSV/influenza are all negative.  He will be given Rocephin and azithromycin for his new airspace opacity.  He will be brought into the observation unit under the care of the hospitalist service.    Disposition   The patient was admitted to the hospital.     Diagnosis     ICD-10-CM    1. Community acquired pneumonia of right upper lobe of lung  J18.9       2. Acute on chronic right-sided congestive heart failure (H)  I50.813            Discharge Medications   New Prescriptions    No medications on file     Scribe Disclosure:  Nestor RUTH, am serving as a scribe at 6:07 AM on 5/7/2025 to document services personally performed by Xu Fonseca MD based on my observations and the provider's statements to me.        Xu Fonseca MD  05/07/25 3264       Xu Fonseca MD  05/07/25 8196

## 2025-05-08 ENCOUNTER — OFFICE VISIT (OUTPATIENT)
Dept: INTERPRETER SERVICES | Facility: CLINIC | Age: OVER 89
End: 2025-05-08
Payer: COMMERCIAL

## 2025-05-08 ENCOUNTER — APPOINTMENT (OUTPATIENT)
Dept: OCCUPATIONAL THERAPY | Facility: CLINIC | Age: OVER 89
DRG: 193 | End: 2025-05-08
Attending: INTERNAL MEDICINE
Payer: COMMERCIAL

## 2025-05-08 ENCOUNTER — OFFICE VISIT (OUTPATIENT)
Dept: INTERPRETER SERVICES | Facility: CLINIC | Age: OVER 89
End: 2025-05-08

## 2025-05-08 LAB
ACB COMPLEX DNA BLD POS QL NAA+NON-PROBE: NOT DETECTED
ANION GAP SERPL CALCULATED.3IONS-SCNC: 11 MMOL/L (ref 7–15)
B FRAGILIS DNA BLD POS QL NAA+NON-PROBE: NOT DETECTED
BACTERIA SPEC CULT: ABNORMAL
BACTERIA SPEC CULT: ABNORMAL
BACTERIA SPEC CULT: NORMAL
BUN SERPL-MCNC: 51 MG/DL (ref 8–23)
C ALBICANS DNA BLD POS QL NAA+NON-PROBE: NOT DETECTED
C AURIS DNA BLD POS QL NAA+NON-PROBE: NOT DETECTED
C GATTII+NEOFOR DNA BLD POS QL NAA+N-PRB: NOT DETECTED
C GLABRATA DNA BLD POS QL NAA+NON-PROBE: NOT DETECTED
C KRUSEI DNA BLD POS QL NAA+NON-PROBE: NOT DETECTED
C PARAP DNA BLD POS QL NAA+NON-PROBE: NOT DETECTED
C TROPICLS DNA BLD POS QL NAA+NON-PROBE: NOT DETECTED
CALCIUM SERPL-MCNC: 9.4 MG/DL (ref 8.8–10.4)
CHLORIDE SERPL-SCNC: 102 MMOL/L (ref 98–107)
CREAT SERPL-MCNC: 1.95 MG/DL (ref 0.67–1.17)
E CLOAC COMP DNA BLD POS NAA+NON-PROBE: NOT DETECTED
E COLI DNA BLD POS QL NAA+NON-PROBE: NOT DETECTED
E FAECALIS DNA BLD POS QL NAA+NON-PROBE: NOT DETECTED
E FAECIUM DNA BLD POS QL NAA+NON-PROBE: NOT DETECTED
EGFRCR SERPLBLD CKD-EPI 2021: 32 ML/MIN/1.73M2
ELLIPTOCYTES BLD QL SMEAR: SLIGHT
ENTEROBACTERALES DNA BLD POS NAA+N-PRB: NOT DETECTED
ERYTHROCYTE [DISTWIDTH] IN BLOOD BY AUTOMATED COUNT: 17.1 % (ref 10–15)
FRAGMENTS BLD QL SMEAR: SLIGHT
GLUCOSE SERPL-MCNC: 100 MG/DL (ref 70–99)
GP B STREP DNA BLD POS QL NAA+NON-PROBE: NOT DETECTED
HAEM INFLU DNA BLD POS QL NAA+NON-PROBE: NOT DETECTED
HCO3 SERPL-SCNC: 29 MMOL/L (ref 22–29)
HCT VFR BLD AUTO: 30.2 % (ref 40–53)
HGB BLD-MCNC: 9.5 G/DL (ref 13.3–17.7)
K OXYTOCA DNA BLD POS QL NAA+NON-PROBE: NOT DETECTED
KLEBSIELLA SP DNA BLD POS QL NAA+NON-PRB: NOT DETECTED
KLEBSIELLA SP DNA BLD POS QL NAA+NON-PRB: NOT DETECTED
L MONOCYTOG DNA BLD POS QL NAA+NON-PROBE: NOT DETECTED
MCH RBC QN AUTO: 20.1 PG (ref 26.5–33)
MCHC RBC AUTO-ENTMCNC: 31.5 G/DL (ref 31.5–36.5)
MCV RBC AUTO: 64 FL (ref 78–100)
MRSA DNA SPEC QL NAA+PROBE: NEGATIVE
N MEN DNA BLD POS QL NAA+NON-PROBE: NOT DETECTED
P AERUGINOSA DNA BLD POS NAA+NON-PROBE: NOT DETECTED
PLAT MORPH BLD: ABNORMAL
PLATELET # BLD AUTO: 112 10E3/UL (ref 150–450)
POTASSIUM SERPL-SCNC: 4.5 MMOL/L (ref 3.4–5.3)
PROTEUS SP DNA BLD POS QL NAA+NON-PROBE: NOT DETECTED
RBC # BLD AUTO: 4.73 10E6/UL (ref 4.4–5.9)
RBC MORPH BLD: ABNORMAL
S AUREUS DNA BLD POS QL NAA+NON-PROBE: NOT DETECTED
S AUREUS+CONS DNA BLD POS NAA+NON-PROBE: DETECTED
S EPIDERMIDIS DNA BLD POS QL NAA+NON-PRB: NOT DETECTED
S LUGDUNENSIS DNA BLD POS QL NAA+NON-PRB: NOT DETECTED
S MALTOPHILIA DNA BLD POS QL NAA+NON-PRB: NOT DETECTED
S MARCESCENS DNA BLD POS NAA+NON-PROBE: NOT DETECTED
S PNEUM DNA BLD POS QL NAA+NON-PROBE: NOT DETECTED
S PYO DNA BLD POS QL NAA+NON-PROBE: NOT DETECTED
SA TARGET DNA: POSITIVE
SALMONELLA DNA BLD POS QL NAA+NON-PROBE: NOT DETECTED
SODIUM SERPL-SCNC: 142 MMOL/L (ref 135–145)
STREPTOCOCCUS DNA BLD POS NAA+NON-PROBE: NOT DETECTED
TARGETS BLD QL SMEAR: SLIGHT
WBC # BLD AUTO: 3.1 10E3/UL (ref 4–11)

## 2025-05-08 PROCEDURE — 250N000011 HC RX IP 250 OP 636: Performed by: INTERNAL MEDICINE

## 2025-05-08 PROCEDURE — 94640 AIRWAY INHALATION TREATMENT: CPT

## 2025-05-08 PROCEDURE — T1013 SIGN LANG/ORAL INTERPRETER: HCPCS | Mod: U3

## 2025-05-08 PROCEDURE — 97165 OT EVAL LOW COMPLEX 30 MIN: CPT | Mod: GO

## 2025-05-08 PROCEDURE — 80048 BASIC METABOLIC PNL TOTAL CA: CPT | Performed by: INTERNAL MEDICINE

## 2025-05-08 PROCEDURE — 85041 AUTOMATED RBC COUNT: CPT | Performed by: INTERNAL MEDICINE

## 2025-05-08 PROCEDURE — 250N000013 HC RX MED GY IP 250 OP 250 PS 637: Performed by: INTERNAL MEDICINE

## 2025-05-08 PROCEDURE — 999N000157 HC STATISTIC RCP TIME EA 10 MIN

## 2025-05-08 PROCEDURE — 250N000009 HC RX 250: Performed by: INTERNAL MEDICINE

## 2025-05-08 PROCEDURE — 36415 COLL VENOUS BLD VENIPUNCTURE: CPT | Performed by: INTERNAL MEDICINE

## 2025-05-08 PROCEDURE — 120N000001 HC R&B MED SURG/OB

## 2025-05-08 PROCEDURE — 87186 SC STD MICRODIL/AGAR DIL: CPT | Performed by: INTERNAL MEDICINE

## 2025-05-08 PROCEDURE — 87640 STAPH A DNA AMP PROBE: CPT | Performed by: INTERNAL MEDICINE

## 2025-05-08 PROCEDURE — 97530 THERAPEUTIC ACTIVITIES: CPT | Mod: GO

## 2025-05-08 PROCEDURE — 99233 SBSQ HOSP IP/OBS HIGH 50: CPT | Performed by: INTERNAL MEDICINE

## 2025-05-08 PROCEDURE — 94640 AIRWAY INHALATION TREATMENT: CPT | Mod: 76

## 2025-05-08 PROCEDURE — 97535 SELF CARE MNGMENT TRAINING: CPT | Mod: GO

## 2025-05-08 RX ADMIN — IPRATROPIUM BROMIDE AND ALBUTEROL SULFATE 3 ML: .5; 3 SOLUTION RESPIRATORY (INHALATION) at 07:52

## 2025-05-08 RX ADMIN — ACETAMINOPHEN 1000 MG: 500 TABLET, FILM COATED ORAL at 22:31

## 2025-05-08 RX ADMIN — HYDRALAZINE HYDROCHLORIDE 25 MG: 25 TABLET ORAL at 14:33

## 2025-05-08 RX ADMIN — BRIMONIDINE TARTRATE AND TIMOLOL MALEATE 1 DROP: 2; 5 SOLUTION OPHTHALMIC at 10:54

## 2025-05-08 RX ADMIN — CEFTRIAXONE SODIUM 2 G: 2 INJECTION, POWDER, FOR SOLUTION INTRAMUSCULAR; INTRAVENOUS at 08:01

## 2025-05-08 RX ADMIN — IPRATROPIUM BROMIDE AND ALBUTEROL SULFATE 3 ML: .5; 3 SOLUTION RESPIRATORY (INHALATION) at 10:52

## 2025-05-08 RX ADMIN — METOPROLOL TARTRATE 25 MG: 25 TABLET, FILM COATED ORAL at 21:06

## 2025-05-08 RX ADMIN — METOPROLOL TARTRATE 25 MG: 25 TABLET, FILM COATED ORAL at 08:02

## 2025-05-08 RX ADMIN — APIXABAN 2.5 MG: 2.5 TABLET, FILM COATED ORAL at 21:06

## 2025-05-08 RX ADMIN — PANTOPRAZOLE SODIUM 40 MG: 40 TABLET, DELAYED RELEASE ORAL at 06:25

## 2025-05-08 RX ADMIN — MONTELUKAST 10 MG: 10 TABLET, FILM COATED ORAL at 08:01

## 2025-05-08 RX ADMIN — BRIMONIDINE TARTRATE AND TIMOLOL MALEATE 1 DROP: 2; 5 SOLUTION OPHTHALMIC at 20:08

## 2025-05-08 RX ADMIN — SIMVASTATIN 40 MG: 40 TABLET, FILM COATED ORAL at 21:06

## 2025-05-08 RX ADMIN — ASPIRIN 81 MG: 81 TABLET, COATED ORAL at 21:06

## 2025-05-08 RX ADMIN — HYDRALAZINE HYDROCHLORIDE 25 MG: 25 TABLET ORAL at 21:06

## 2025-05-08 RX ADMIN — FUROSEMIDE 20 MG: 10 INJECTION, SOLUTION INTRAMUSCULAR; INTRAVENOUS at 08:01

## 2025-05-08 RX ADMIN — PANTOPRAZOLE SODIUM 40 MG: 40 TABLET, DELAYED RELEASE ORAL at 15:58

## 2025-05-08 RX ADMIN — IPRATROPIUM BROMIDE AND ALBUTEROL SULFATE 3 ML: .5; 3 SOLUTION RESPIRATORY (INHALATION) at 15:16

## 2025-05-08 RX ADMIN — ISOSORBIDE MONONITRATE 60 MG: 60 TABLET, EXTENDED RELEASE ORAL at 08:01

## 2025-05-08 RX ADMIN — BUDESONIDE 0.5 MG: 0.5 INHALANT RESPIRATORY (INHALATION) at 21:13

## 2025-05-08 RX ADMIN — APIXABAN 2.5 MG: 2.5 TABLET, FILM COATED ORAL at 08:01

## 2025-05-08 RX ADMIN — FUROSEMIDE 20 MG: 10 INJECTION, SOLUTION INTRAMUSCULAR; INTRAVENOUS at 14:33

## 2025-05-08 RX ADMIN — LATANOPROST 1 DROP: 50 SOLUTION/ DROPS OPHTHALMIC at 21:05

## 2025-05-08 RX ADMIN — AZITHROMYCIN DIHYDRATE 250 MG: 250 TABLET ORAL at 08:01

## 2025-05-08 RX ADMIN — BUDESONIDE 0.5 MG: 0.5 INHALANT RESPIRATORY (INHALATION) at 07:52

## 2025-05-08 RX ADMIN — ASPIRIN 81 MG: 81 TABLET, COATED ORAL at 08:01

## 2025-05-08 RX ADMIN — HYDRALAZINE HYDROCHLORIDE 25 MG: 25 TABLET ORAL at 08:01

## 2025-05-08 RX ADMIN — IPRATROPIUM BROMIDE AND ALBUTEROL SULFATE 3 ML: .5; 3 SOLUTION RESPIRATORY (INHALATION) at 21:13

## 2025-05-08 ASSESSMENT — ACTIVITIES OF DAILY LIVING (ADL)
ADLS_ACUITY_SCORE: 59
ADLS_ACUITY_SCORE: 59
ADLS_ACUITY_SCORE: 53
ADLS_ACUITY_SCORE: 53
ADLS_ACUITY_SCORE: 59
DEPENDENT_IADLS:: INDEPENDENT
ADLS_ACUITY_SCORE: 53
ADLS_ACUITY_SCORE: 59
ADLS_ACUITY_SCORE: 53
ADLS_ACUITY_SCORE: 59
ADLS_ACUITY_SCORE: 53
ADLS_ACUITY_SCORE: 59
ADLS_ACUITY_SCORE: 53
ADLS_ACUITY_SCORE: 53
ADLS_ACUITY_SCORE: 59
ADLS_ACUITY_SCORE: 53
ADLS_ACUITY_SCORE: 53
ADLS_ACUITY_SCORE: 59
ADLS_ACUITY_SCORE: 53
ADLS_ACUITY_SCORE: 59
ADLS_ACUITY_SCORE: 59
ADLS_ACUITY_SCORE: 53

## 2025-05-08 NOTE — PROGRESS NOTES
Observation goals  PRIOR TO DISCHARGE        Comments:   -diagnostic tests and consults completed and resulted not met  -vital signs normal or at patient baseline met  -dyspnea improved and O2 sats greater than 88% on room air or prior home oxygen levels partially met, pt reports CLEARY w/ ambulation  -returns to baseline functional status not met  Nurse to notify provider when observation goals have been met and patient is ready for discharge.

## 2025-05-08 NOTE — CONSULTS
Care Management Initial Consult    General Information  Assessment completed with: Shiraz Asher  Type of CM/SW Visit: Initial Assessment    Primary Care Provider verified and updated as needed: Yes   Readmission within the last 30 days: no previous admission in last 30 days      Reason for Consult: discharge planning  Advance Care Planning: Advance Care Planning Reviewed: no concerns identified          Communication Assessment  Patient's communication style: spoken language (English or Bilingual)    Hearing Difficulty or Deaf: yes   Wear Glasses or Blind: yes    Cognitive  Cognitive/Neuro/Behavioral: WDL                      Living Environment:   People in home: child(perla), adult     Current living Arrangements: house      Able to return to prior arrangements: yes       Family/Social Support:  Care provided by: self, child(perla)  Provides care for:    Marital Status:   Support system:            Description of Support System:           Current Resources:   Patient receiving home care services: No        Community Resources: None  Equipment currently used at home: grab bar, tub/shower  Supplies currently used at home: Nebulizer tubing    Employment/Financial:  Employment Status: retired        Financial Concerns: none           Does the patient's insurance plan have a 3 day qualifying hospital stay waiver?  Yes     Which insurance plan 3 day waiver is available? Alternative insurance waiver    Will the waiver be used for post-acute placement? No    Lifestyle & Psychosocial Needs:  Social Drivers of Health     Food Insecurity: Low Risk  (5/7/2025)    Food Insecurity     Within the past 12 months, did you worry that your food would run out before you got money to buy more?: No     Within the past 12 months, did the food you bought just not last and you didn t have money to get more?: No   Depression: Not at risk (2/10/2025)    PHQ-2     PHQ-2 Score: 0   Housing Stability: Low Risk  (5/7/2025)    Housing  Stability     Do you have housing? : Yes     Are you worried about losing your housing?: No   Tobacco Use: Medium Risk (4/8/2025)    Patient History     Smoking Tobacco Use: Former     Smokeless Tobacco Use: Never     Passive Exposure: Never   Financial Resource Strain: Low Risk  (5/7/2025)    Financial Resource Strain     Within the past 12 months, have you or your family members you live with been unable to get utilities (heat, electricity) when it was really needed?: No   Alcohol Use: Not on file   Transportation Needs: Low Risk  (5/7/2025)    Transportation Needs     Within the past 12 months, has lack of transportation kept you from medical appointments, getting your medicines, non-medical meetings or appointments, work, or from getting things that you need?: No   Physical Activity: Insufficiently Active (10/28/2020)    Exercise Vital Sign     Days of Exercise per Week: 4 days     Minutes of Exercise per Session: 30 min   Interpersonal Safety: Low Risk  (2/10/2025)    Interpersonal Safety     Do you feel physically and emotionally safe where you currently live?: Yes     Within the past 12 months, have you been hit, slapped, kicked or otherwise physically hurt by someone?: No     Within the past 12 months, have you been humiliated or emotionally abused in other ways by your partner or ex-partner?: No   Stress: Not on file   Social Connections: Unknown (3/19/2021)    Social Connection and Isolation Panel [NHANES]     Frequency of Communication with Friends and Family: More than three times a week     Frequency of Social Gatherings with Friends and Family: Not on file     Attends [employer] Services: Not on file     Active Member of Clubs or Organizations: Not on file     Attends Club or Organization Meetings: Not on file     Marital Status:    Health Literacy: Not on file       Functional Status:  Prior to admission patient needed assistance:   Dependent ADLs:: Independent  Dependent IADLs:: Independent        Mental Health Status:  Mental Health Status: No Current Concerns       Chemical Dependency Status:                Values/Beliefs:  Spiritual, Cultural Beliefs, Spiritism Practices, Values that affect care: no               Discussed  Partnership in Safe Discharge Planning  document with patient/family: Yes: verbally with patient    Additional Information:  Per H&P, patient was admitted for Pneumonia, Acute on  Chronic CHF, Chronic Left pleural effusion.  Writer met with patient at bedside with .   Patient tells wrier he lives in a house with 2 of his 3 sons and one SO of the sons. He still drives around the community. His sons help him if he needs anything.   He would like to discharge home when he is medically ready.  He does not identify any CC/SW needs at this time.    Next Steps: sign off    Elise Marroquin RN

## 2025-05-08 NOTE — PLAN OF CARE
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; ASL intepreter scheduled at 1400 and 0930  Mobility Level/Assist Equipment: stand-by assist x1 and cane, ambulated to the end of each hallway today, no c/o SOB  Antibiotics & Plan): PO Zithromax (3 doses remaining) and IV Rocephin for PNA  Pain Management: Denies pain  Tele/VS/O2: Tele 75% V-paced, occasional jt rates in 50's; VSS on RA, see BP's (one soft at noon after AM meds, slightly orthostatic but asymptomatic), afebrile  ABNL Lab/BG: one BC (+) see MD notification--MD ordered BC redraw and nasal swab; prior Trops 52-50, BNP 5299, US BLE neg for DVT, CXR (+) PNA  Diet: 2g Na, low fat, 2L FR, tolerating well   Bowel/Bladder: Continent, uses urinal  Skin Concerns: forearms with bruising from lab draws; 1-2+ edema BLLE (L>R), dry/flaky skin  Drains/Devices: PIV SL, Tele, uses 2L O2 overnight baseline  Other Important info for NEXT shift: DEAF answer call light in person, use whiteboard for simple needs,  when available or on-demand on monitor

## 2025-05-08 NOTE — PROGRESS NOTES
[facility]    Medicine Progress Note - Hospitalist Service    Date of Admission:  5/7/2025    Assessment & Plan      Shiraz Ingram is a 89 year old male admitted on 5/7/2025.   Shiraz Ingram is a 89 year old male with history of aortic stenosis, status post TAVR, CAD status post CABG in 2002, chronic  diastolic heart failure, history of MAYA thrombus, pulmonary hypertension history of PPM, hypertension, chronic atrial fibrillation, chronic kidney disease stage III, chronic thrombocytopenia, PUD, hyperlipidemia, who presents with cough, dyspnea and lower extremity swelling(more than left compared to right) for last 5 days      Right upper lobe community-acquired pneumonia  Acute on chronic diastolic congestive heart failure exacerbation  Chronic left pleural effusion  Cough with dyspnea, likely multifactorial from above  Bilateral lower extremity edema,  left more than right  - Chest x-ray with mildly enlarged cardiac silhouette, persistent left lung base airspace opacification and pleural effusion, new airspace opacification in the right upper lobe concerning for pneumonia  -WBC count around his baseline, viral panel negative, procalcitonin 0.18  - Received 60 mg of IV Lasix in the ED,   -continue with 20 mg IV Lasix twice daily while monitoring his renal function  - Hold PTA diuresis while getting IV diuretics  - Strict I's and O's, daily weight  - Continue ceftriaxone and azithromycin  -Left lower extremity negative for DVT  -Continue to monitor in telemetry    Positive blood culture with GPC  - Blood culture drawn on admission growing gram-positive cocci in clusters 1 out of 2 specimen, verigene panel shows staph species other than Staph aureus, Staph epidermidis and staph lugdunensis.  Patient improving clinically on ceftriaxone so more than likely it is a contaminant culture and could be coagulase-negative staph  - Will continue current antibiotics  - Recheck blood culture  sent      History of aortic stenosis status post TAVR  Chronic atrial fibrillation  History of MAYA thrombus  Pulmonary hypertension  History of PPM  History of CAD status post CABG 2002  Chronically elevated troponin  -Patient has chronically elevated troponin in the 50s.  Currently denies any chest pain.  Troponin is flat  - Monitor in telemetry  - Continue PTA hydralazine, nitrate, beta-blocker with holding parameters, resume PTA statin  - Continue PTA Eliquis and aspirin    Chronic kidney disease stage IIIb  - Baseline creatinine around 2, creatinine currently at baseline  - Monitor closely with diuresis  - Avoid nephrotoxins    Peptic ulcer disease  - Continue PTA PPIs    Chronic pancytopenia  - Heme labs around his baseline, no history of active bleeding  - Outpatient follow-up       Observation Goals: -diagnostic tests and consults completed and resulted, -vital signs normal or at patient baseline, -dyspnea improved and O2 sats greater than 88% on room air or prior home oxygen levels, -returns to baseline functional status, Nurse to notify provider when observation goals have been met and patient is ready for discharge.  Diet: Combination Diet 2 gm NA Diet; No Caffeine Diet (and additional linked orders)  Fluid restriction 2000 ML FLUID (and additional linked orders)    DVT Prophylaxis: DOAC  Pak Catheter: Not present  Lines: None     Cardiac Monitoring: ACTIVE order. Indication: Acute decompensated heart failure (48 hours)  Code Status: No CPR- Do NOT Intubate      Clinically Significant Risk Factors Present on Admission                # Drug Induced Coagulation Defect: home medication list includes an anticoagulant medication  # Drug Induced Platelet Defect: home medication list includes an antiplatelet medication   # Hypertension: Noted on problem list  # Acute heart failure with preserved ejection fraction: heart failure noted on problem list, last echo with EF >50%, and receiving IV diuretics     #  Anemia: based on hgb <11           # Asthma: noted on problem list  # Pacemaker present       Social Drivers of Health    Tobacco Use: Medium Risk (4/8/2025)    Patient History     Smoking Tobacco Use: Former     Smokeless Tobacco Use: Never     Passive Exposure: Never   Physical Activity: Insufficiently Active (10/28/2020)    Exercise Vital Sign     Days of Exercise per Week: 4 days     Minutes of Exercise per Session: 30 min   Social Connections: Unknown (3/19/2021)    Social Connection and Isolation Panel [NHANES]     Frequency of Communication with Friends and Family: More than three times a week     Marital Status:           Disposition Plan     Medically Ready for Discharge: Anticipated Tomorrow if continued improvement and more information from blood culture available             Tiffany Humphrey MD  Hospitalist Service  [facility]  Securely message with [employer] (more info)  Text page via [employer]. Paging/Directory   ______________________________________________________________________    Interval History   Communicated the patient with the help of  through iPad.  Patient reports feeling better.  When I had evaluated the patient he reported less cough, had not moved around yet but felt like he could potentially go home.  Some lower extremity swelling persisting but improved compared to yesterday.  Later I was notified by nursing about positive blood culture    Physical Exam   Vital Signs: Temp: 97.8  F (36.6  C) Temp src: Oral BP: 127/68 Pulse: 63   Resp: 18 SpO2: 96 % O2 Device: Nasal cannula Oxygen Delivery: 2 LPM  Weight: 138 lbs 8 oz    Exam:  Constitutional: Awake, alert and no distress. Appears comfortable  Head: Normocephalic. No masses, lesions, tenderness or abnormalities  ENMT: ENT exam normal, no neck nodes or sinus tenderness  Cardiovascular: RRR.  2+ murmurs, no rubs or JVD  Respiratory: Normal WOB,b/l equal air entry, no wheezes or crackles    Gastrointestinal: Abdomen soft, non-tender. BS normal. No masses, organomegaly  : Deferred  Extremities : Minimal bilateral edema , no clubbing or cyanosis      Medical Decision Making       52 MINUTES SPENT BY ME on the date of service doing chart review, history, exam, documentation & further activities per the note.      Data     I have personally reviewed the following data over the past 24 hrs:    3.1 (L)  \   9.5 (L)   / 112 (L)     142 102 51.0 (H) /  100 (H)   4.5 29 1.95 (H) \       Imaging results reviewed over the past 24 hrs:   No results found for this or any previous visit (from the past 24 hours).  Recent Labs   Lab 05/08/25  0644 05/07/25  0530   WBC 3.1* 2.9*   HGB 9.5* 10.6*   MCV 64* 66*   * 125*    138   POTASSIUM 4.5 5.0   CHLORIDE 102 100   CO2 29 26   BUN 51.0* 59.0*   CR 1.95* 2.03*   ANIONGAP 11 12   AYALA 9.4 9.6   * 102*   ALBUMIN  --  4.0   PROTTOTAL  --  7.6   BILITOTAL  --  0.6   ALKPHOS  --  84   ALT  --  13   AST  --  23

## 2025-05-08 NOTE — PROVIDER NOTIFICATION
"   05/08/25 1038   Critical Test Results/Notification   Critical Lab Result (Lab Name and Value) L arm blood cuture result (collected yesterday) \"gram positive cocci in clusters\"   What Time Did The Lab Notify You? 1038   Provider Notified yes   Date of Provider Notification 05/08/25   Time of Provider Notification 1038   Mechanism of Provider notification verbal (face-to-face)   What Provider Did You Notify? Dr. Humphrey   Response orders obtained       Updated:    05/08/25 1141   Significant Event   Significant Event Critical result/value   Critical Test Results/Notification   Critical Lab Result (Lab Name and Value) blood culture (L arm) updated to: \"Staph Species\"   What Time Did The Lab Notify You? 1141   Provider Notified yes   Date of Provider Notification 05/08/25   Time of Provider Notification 1142   Mechanism of Provider notification page   What Provider Did You Notify? Dr. Humphrey   Response aware       "

## 2025-05-08 NOTE — PROGRESS NOTES
05/08/25 1602   Appointment Info   Signing Clinician's Name / Credentials (OT) DIMITRI Jones   Student Supervision Direct supervision provided       Present yes   Language ASL   Living Environment   People in Home child(perla), adult;friend(s)   Current Living Arrangements house   Home Accessibility no concerns   Transportation Anticipated family or friend will provide   Living Environment Comments Pt lives in first floor of home with 2 stairs to enter. In home with him are two adult sons in basement and friends who live upstairs. Pt has grab bars in the (tub) shower, raised toilet seat.   Self-Care   Usual Activity Tolerance good   Current Activity Tolerance good   Regular Exercise Yes   Activity/Exercise Type walking   Exercise Amount/Frequency daily   Equipment Currently Used at Home grab bar, tub/shower  (cane)   Fall history within last six months no   Activity/Exercise/Self-Care Comment Pt IND with ADLs at baseline.   Instrumental Activities of Daily Living (IADL)   IADL Comments Pt IND with IADLs at baseline.   General Information   Onset of Illness/Injury or Date of Surgery 05/07/25   Referring Physician Tiffany Humphrey MD   Additional Occupational Profile Info/Pertinent History of Current Problem Shiraz Ingram is a 89 year old male on Eliquis with a history of CHF, hypertension, atrial fibrillation who presents with shortness of breath. For the past 4 days the patient has been experiencing shortness of breath, slight leg swelling, productive cough. He has not missed any doses of his medications.   Existing Precautions/Restrictions fall;cardiac   Cognitive Status Examination   Orientation Status orientation to person, place and time   Cognitive Status Comments No concerns. Pt very pleasant.   Visual Perception   Impact of Vision Impairment on Function (Vision) Pt blind in L eye, sees with R   Pain Assessment   Patient Currently in Pain No   Range of Motion Comprehensive    Comment, General Range of Motion WFL   Muscle Tone Assessment   Muscle Tone Comments WFL   Bed Mobility   Bed Mobility supine-sit;sit-supine   Supine-Sit [place] (Bed Mobility) supervision   Sit-Supine [place] (Bed Mobility) supervision   Transfers   Transfers sit-stand transfer   Sit-Stand Transfer   Sit-Stand [place] (Transfers) supervision   Assistive Device (Sit-Stand Transfers) cane, straight   Balance   Balance Comments Pt balance requiring only SBA   Activities of Daily Living   BADL Assessment/Intervention toileting;grooming;upper body dressing;lower body dressing;bathing   Bathing Assessment/Intervention   [place] Level (Bathing) supervision   Comment, (Bathing) per clinical judgement   Upper Body Dressing Assessment/Training   [place] Level (Upper Body Dressing) modified independence   Comment, (Upper Body Dressing) per clinical judgement   Lower Body Dressing Assessment/Training   [place] Level (Lower Body Dressing) modified independence   Comment, (Lower Body Dressing) Pr clinical judgement   Grooming Assessment/Training   [place] Level (Grooming) modified independence   Position (Grooming) unsupported standing   Toileting   [place] Level (Toileting) supervision   Position (Toileting) unsupported standing   Clinical Impression   Criteria for Skilled Therapeutic Interventions Met (OT) Yes, treatment indicated   OT Diagnosis decreased IND with ADLs   OT Problem List-Impairments impacting ADL problems related to;activity tolerance impaired;mobility   Assessment of Occupational Performance 1-3 Performance Deficits   Identified Performance Deficits act palak, bathing, IADLs   Planned Therapy Interventions (OT) ADL retraining;strengthening;home program guidelines;progressive activity/exercise;risk factor education   Clinical Decision Making Complexity (OT) problem focused assessment/low complexity   Risk & Benefits of therapy have been explained evaluation/treatment  results reviewed;care plan/treatment goals reviewed;risks/benefits reviewed;participants included;patient   OT Total Evaluation Time   OT Eval, Low Complexity Minutes (81499) 12   OT Goals   Therapy Frequency (OT) Daily   OT Predicted Duration/Target Date for Goal Attainment 05/16/25   OT Goals Toilet Transfer/Toileting;OT Goal 1;Upper Body Bathing;Lower Body Bathing;Cardiac Phase 1   OT: Upper Body Bathing Modified independent;using adaptive equipment   OT: Lower Body Bathing Modified independent;using adaptive equipment   OT: Toilet Transfer/Toileting Modified independent   OT: Understanding of cardiac education to maximize quality of life, condition management, and health outcomes Patient;Verbalize;Caregiver   OT: Functional/aerobic ambulation tolerance with stable cardiovascular response in order to return to home and community environment Greater than 300 feet;Straight cane;Modified independent   OT: Goal 1 Pt with ascend/descend 2 stairs to improve safety adter d/c from hospital   Interventions   Interventions Quick Adds Self-Care/Home Management;Therapeutic Activity   Self-Care/Home Management   Self-Care/Home Mgmt/ADL, Compensatory, Meal Prep Minutes (15490) 14   Symptoms Noted During/After Treatment (Meal Preparation/Planning Training) none   Treatment Detail/Skilled Intervention Pt greeted supine in bed, agreeable to OT. Pt familiar with OT.  present during session, increased time required for interpretation and vital sign monitoring. Pt VSS on RA throughout session. Gathered eval info. Briefly discussed CHF packet--pt reporting he already went over the information with MD. Ambulated with pt, see below for details. Upon return to room, pt voided in toilet from standing with SBA, performed hand hygeine with SBA. Encouraged pt to sit in chair but pt wanting to go back to bed, stand<>sit EOB with bed rails, then sit<>supine all with SBA. Provided nail clipper to pt. Left pt in bed with bed  alarm on, all needs met, RN updated.   Therapeutic Activities   Therapeutic Activity Minutes (67488) 18   Symptoms noted during/after treatment none   Treatment Detail/Skilled Intervention Pt ambulated ~ 350ft in hallway with straight cane, CGA progressing to SBA, chair follow. VSS on RA throughout walk and pt denies SOB. Discussed benefits of walking with RN, NA throughout day, fall prevention strategies. One standing rest break after ~200ft.   OT Discharge Planning   OT Plan 2 stairs, act palak, EC strategies   OT Discharge Recommendation (DC Rec) home with assist   OT Rationale for DC Rec Pt slightly below baseline d/t decreased act palak and SOB with activity. Pt moving with Ax1, CGA progressing to SBA. Typically IND with I/ADLs, lives with adult children and friends in house. Recommend pt d/c home with supervision for bathing, toilet transfers, and assist for heavy IADLs.    OT Brief overview of current status Goals of therapy will be to address safe mobility and make recs for d/c to next level of care. Pt and RN will continue to follow all falls risk precautions as documented by RN staff while hospitalized.   OT Total Distance Amb During Session (feet) 370   Total Session Time   Timed Code Treatment Minutes 32   Total Session Time (sum of timed and untimed services) 44

## 2025-05-08 NOTE — ED NOTES
Patient given his nighttime medications. Assessment done through in-person  at bedside. Patient denies any pain at this time. Vitally stable on room air.

## 2025-05-08 NOTE — PROGRESS NOTES
Admission/Transfer from: ED  2 RN skin assessment completed. Yes  Name of 2nd RN: Lesley PALOMARES  Significant findings include: 3+ BLE edema to ankles and feet, scattered bruising, dry/flaky heels/feet  Provider Paged for [facility] Nurse Consult Order? No

## 2025-05-08 NOTE — PLAN OF CARE
Goal Outcome Evaluation:  PRIMARY Concern: CLEARY, Cough, Community acquired pna  SAFETY RISK Concerns (fall risk, behaviors, etc.): Falls      Aggression Tool Color: Green  Isolation/Type: None  Tests/Procedures for NEXT shift: Labs, IV abx  Consults? (Pending/following, signed-off?) OT Consult  Where is patient from? (Home, TCU, etc.): Home w/ 2 sons  Other Important info for NEXT shift: Pt is deaf, whiteboard for simple communication, jabber in room.  corby  Anticipated DC date & active delays: TBD  _____________________________________________________________________________  SUMMARY NOTE:   Orientation/Cognitive: A&Ox4; ASL intepreter scheduled  Observation Goals (Met/ Not Met): Not met  Mobility Level/Assist Equipment: SBA/A1 w/ cane  Antibiotics & Plan (IV/po, length of tx left): IV Azithromax and IV rocephin for pna  Pain Management: Denies pain  Complete Pain Reassessment: Y/N Y Due next: Q shift  Tele/VS/O2: VSS on 2L NC (pt wears O2 at night only) x HTN, Tele- V paced, SB  ABNL Lab/BG: CXR shows pna, WBC 2.9, Cr 2.03, Trops 52-50, BNP 5299, US BLE neg for DVT  Diet: 2g Na, low fat, 2L FR  Bowel/Bladder: Cont, uses urinal  Skin Concerns: Scattered bruising, 3+ edema to BLE ankles/feet, dry/flaky heels/feet  Drains/Devices: PIV SL, 2L NC, Tele  Patient Stated Goal for Today: Sleep

## 2025-05-08 NOTE — PROGRESS NOTES
RECEIVING UNIT ED HANDOFF REVIEW    ED Nurse Handoff Report was reviewed by: Smita Brantley RN on May 7, 2025 at 8:24 PM

## 2025-05-08 NOTE — PROVIDER NOTIFICATION
MD Notification    Notified Person: MD    Notified Person Name:  mehran    Notification Date/Time: 5/8/2025 0945      Notification Interaction: via [employer]    Purpose of Notification: FYI, we have inperson interpretor for this pt until 10 am and than another one coming at 2pm to 4 pm. They are recommending all visits to happen with inperson interpretor as much as possible. thanks    Orders Received:    Comments:

## 2025-05-08 NOTE — PLAN OF CARE
Goal Outcome Evaluation:      Plan of Care Reviewed With: patient    Overall Patient Progress: improvingOverall Patient Progress: improving    Outcome Evaluation: Patient wants to discharge home once Medically ready

## 2025-05-09 ENCOUNTER — APPOINTMENT (OUTPATIENT)
Dept: OCCUPATIONAL THERAPY | Facility: CLINIC | Age: OVER 89
DRG: 193 | End: 2025-05-09
Payer: COMMERCIAL

## 2025-05-09 ENCOUNTER — OFFICE VISIT (OUTPATIENT)
Dept: INTERPRETER SERVICES | Facility: CLINIC | Age: OVER 89
End: 2025-05-09
Payer: COMMERCIAL

## 2025-05-09 VITALS
HEART RATE: 50 BPM | SYSTOLIC BLOOD PRESSURE: 102 MMHG | WEIGHT: 138.5 LBS | RESPIRATION RATE: 18 BRPM | TEMPERATURE: 98.5 F | HEIGHT: 67 IN | BODY MASS INDEX: 21.74 KG/M2 | OXYGEN SATURATION: 97 % | DIASTOLIC BLOOD PRESSURE: 59 MMHG

## 2025-05-09 VITALS
HEIGHT: 67 IN | DIASTOLIC BLOOD PRESSURE: 66 MMHG | OXYGEN SATURATION: 95 % | WEIGHT: 138 LBS | SYSTOLIC BLOOD PRESSURE: 156 MMHG | HEART RATE: 51 BPM | BODY MASS INDEX: 21.66 KG/M2 | TEMPERATURE: 99.2 F | RESPIRATION RATE: 18 BRPM

## 2025-05-09 LAB
ANION GAP SERPL CALCULATED.3IONS-SCNC: 13 MMOL/L (ref 7–15)
BASOPHILS # BLD AUTO: 0 10E3/UL (ref 0–0.2)
BASOPHILS NFR BLD AUTO: 1 %
BUN SERPL-MCNC: 43.4 MG/DL (ref 8–23)
CALCIUM SERPL-MCNC: 9.2 MG/DL (ref 8.8–10.4)
CHLORIDE SERPL-SCNC: 99 MMOL/L (ref 98–107)
CREAT SERPL-MCNC: 1.85 MG/DL (ref 0.67–1.17)
EGFRCR SERPLBLD CKD-EPI 2021: 34 ML/MIN/1.73M2
EOSINOPHIL # BLD AUTO: 0.2 10E3/UL (ref 0–0.7)
EOSINOPHIL NFR BLD AUTO: 5 %
ERYTHROCYTE [DISTWIDTH] IN BLOOD BY AUTOMATED COUNT: 17 % (ref 10–15)
GLUCOSE SERPL-MCNC: 101 MG/DL (ref 70–99)
HCO3 SERPL-SCNC: 29 MMOL/L (ref 22–29)
HCT VFR BLD AUTO: 31.1 % (ref 40–53)
HGB BLD-MCNC: 9.4 G/DL (ref 13.3–17.7)
IMM GRANULOCYTES # BLD: 0 10E3/UL
IMM GRANULOCYTES NFR BLD: 0 %
LYMPHOCYTES # BLD AUTO: 0.6 10E3/UL (ref 0.8–5.3)
LYMPHOCYTES NFR BLD AUTO: 17 %
MCH RBC QN AUTO: 20 PG (ref 26.5–33)
MCHC RBC AUTO-ENTMCNC: 30.2 G/DL (ref 31.5–36.5)
MCV RBC AUTO: 66 FL (ref 78–100)
MONOCYTES # BLD AUTO: 0.3 10E3/UL (ref 0–1.3)
MONOCYTES NFR BLD AUTO: 9 %
NEUTROPHILS # BLD AUTO: 2.4 10E3/UL (ref 1.6–8.3)
NEUTROPHILS NFR BLD AUTO: 69 %
NRBC # BLD AUTO: 0 10E3/UL
NRBC BLD AUTO-RTO: 0 /100
PLATELET # BLD AUTO: 114 10E3/UL (ref 150–450)
POTASSIUM SERPL-SCNC: 4.4 MMOL/L (ref 3.4–5.3)
RBC # BLD AUTO: 4.7 10E6/UL (ref 4.4–5.9)
SODIUM SERPL-SCNC: 141 MMOL/L (ref 135–145)
WBC # BLD AUTO: 3.4 10E3/UL (ref 4–11)

## 2025-05-09 PROCEDURE — 97530 THERAPEUTIC ACTIVITIES: CPT | Mod: GO

## 2025-05-09 PROCEDURE — 36415 COLL VENOUS BLD VENIPUNCTURE: CPT | Performed by: INTERNAL MEDICINE

## 2025-05-09 PROCEDURE — 999N000157 HC STATISTIC RCP TIME EA 10 MIN

## 2025-05-09 PROCEDURE — 250N000011 HC RX IP 250 OP 636: Performed by: INTERNAL MEDICINE

## 2025-05-09 PROCEDURE — 250N000013 HC RX MED GY IP 250 OP 250 PS 637: Performed by: INTERNAL MEDICINE

## 2025-05-09 PROCEDURE — T1013 SIGN LANG/ORAL INTERPRETER: HCPCS | Mod: U3

## 2025-05-09 PROCEDURE — 85025 COMPLETE CBC W/AUTO DIFF WBC: CPT | Performed by: INTERNAL MEDICINE

## 2025-05-09 PROCEDURE — 250N000009 HC RX 250: Performed by: INTERNAL MEDICINE

## 2025-05-09 PROCEDURE — 82565 ASSAY OF CREATININE: CPT | Performed by: INTERNAL MEDICINE

## 2025-05-09 PROCEDURE — 94640 AIRWAY INHALATION TREATMENT: CPT | Mod: 76

## 2025-05-09 PROCEDURE — 99239 HOSP IP/OBS DSCHRG MGMT >30: CPT | Performed by: INTERNAL MEDICINE

## 2025-05-09 RX ORDER — TORSEMIDE 10 MG/1
10 TABLET ORAL DAILY
Status: DISCONTINUED | OUTPATIENT
Start: 2025-05-09 | End: 2025-05-09 | Stop reason: HOSPADM

## 2025-05-09 RX ORDER — AZITHROMYCIN 250 MG/1
250 TABLET, FILM COATED ORAL DAILY
Qty: 2 TABLET | Refills: 0 | Status: SHIPPED | OUTPATIENT
Start: 2025-05-10

## 2025-05-09 RX ORDER — CARBOXYMETHYLCELLULOSE SODIUM 5 MG/ML
1 SOLUTION/ DROPS OPHTHALMIC
Status: DISCONTINUED | OUTPATIENT
Start: 2025-05-09 | End: 2025-05-09 | Stop reason: HOSPADM

## 2025-05-09 RX ORDER — CEFDINIR 300 MG/1
300 CAPSULE ORAL DAILY
Qty: 4 CAPSULE | Refills: 0 | Status: SHIPPED | OUTPATIENT
Start: 2025-05-10

## 2025-05-09 RX ADMIN — IPRATROPIUM BROMIDE AND ALBUTEROL SULFATE 3 ML: .5; 3 SOLUTION RESPIRATORY (INHALATION) at 07:04

## 2025-05-09 RX ADMIN — HYDRALAZINE HYDROCHLORIDE 25 MG: 25 TABLET ORAL at 09:21

## 2025-05-09 RX ADMIN — APIXABAN 2.5 MG: 2.5 TABLET, FILM COATED ORAL at 09:21

## 2025-05-09 RX ADMIN — ISOSORBIDE MONONITRATE 60 MG: 60 TABLET, EXTENDED RELEASE ORAL at 09:21

## 2025-05-09 RX ADMIN — CEFTRIAXONE SODIUM 2 G: 2 INJECTION, POWDER, FOR SOLUTION INTRAMUSCULAR; INTRAVENOUS at 09:44

## 2025-05-09 RX ADMIN — IPRATROPIUM BROMIDE AND ALBUTEROL SULFATE 3 ML: .5; 3 SOLUTION RESPIRATORY (INHALATION) at 11:36

## 2025-05-09 RX ADMIN — BUDESONIDE 0.5 MG: 0.5 INHALANT RESPIRATORY (INHALATION) at 07:03

## 2025-05-09 RX ADMIN — TORSEMIDE 10 MG: 10 TABLET ORAL at 11:50

## 2025-05-09 RX ADMIN — PANTOPRAZOLE SODIUM 40 MG: 40 TABLET, DELAYED RELEASE ORAL at 06:31

## 2025-05-09 RX ADMIN — HYDRALAZINE HYDROCHLORIDE 25 MG: 25 TABLET ORAL at 14:15

## 2025-05-09 RX ADMIN — MONTELUKAST 10 MG: 10 TABLET, FILM COATED ORAL at 09:21

## 2025-05-09 RX ADMIN — BRIMONIDINE TARTRATE AND TIMOLOL MALEATE 1 DROP: 2; 5 SOLUTION OPHTHALMIC at 09:39

## 2025-05-09 RX ADMIN — ASPIRIN 81 MG: 81 TABLET, COATED ORAL at 09:21

## 2025-05-09 RX ADMIN — AZITHROMYCIN DIHYDRATE 250 MG: 250 TABLET ORAL at 09:21

## 2025-05-09 ASSESSMENT — ACTIVITIES OF DAILY LIVING (ADL)
ADLS_ACUITY_SCORE: 59

## 2025-05-09 NOTE — PLAN OF CARE
Goal Outcome Evaluation:  PRIMARY Concern: CLEARY, Cough, Community acquired pna  SAFETY RISK Concern: Falls  Aggression Tool Color: Green  Isolation/Type: None  Tests/Procedures for NEXT shift: AM labs  Consults? SW following, OT recs home w/ assist  Where is patient from? 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 Tele V paced.   ABNL Lab/BG: See chart  Diet: 2g Na, low fat, 2L FR  Bowel/Bladder: Continent, uses urinal  Skin Concerns: forearms with bruising from lab draws; 2+ edema BLLE, dry/flaky skin  Drains/Devices: PIV SL

## 2025-05-09 NOTE — PROGRESS NOTES
PRIMARY Concern: CLEARY, Cough, Community acquired pna  SAFETY RISK Concern: Fall Risk     Aggression Tool Color: Green  Isolation/Type: None  Tests/Procedures for NEXT shift: AM labs  Consults? OT and SW following  Where is patient from? 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 Tele=V-paced, occasional jt rates in 50's. O2 at night  ABNL Lab/BG: See chart  Diet: 2g Na, low fat, 2L FR  Bowel/Bladder: Continent, uses urinal  Skin Concerns: forearms with bruising from lab draws; 2+ edema BLLE, dry/flaky skin  Drains/Devices: PIV SL

## 2025-05-09 NOTE — PROGRESS NOTES
Discharge instructions reviewed with ALS  (who is at bedside) and all of his questions were answered. Discharge medications reviewed with patient and sent with patient. All patient's belongings sent with the patient. IV removed. Patient transported to [address] via wheelchair with transport aide without issue.     Veronique Ramos RN on 5/9/2025 at 3:20 PM

## 2025-05-09 NOTE — DISCHARGE SUMMARY
Hospitalist Discharge Summary      Date of Admission:  5/7/2025  Date of Discharge:  5/9/2025  Discharging Provider: Tiffany Humphrey MD  Discharge Service: Hospitalist Service    Discharge Diagnoses   Right upper lobe community-acquired pneumonia  Acute on chronic diastolic congestive heart failure, euvolemic at the time of discharge  Chronic left pleural effusion  Lower extremity edema likely secondary to CHF exacerbation  Cough with dyspnea likely multifactorial from above  Positive blood culture with Staphylococcus capitis, likely contaminant  History of aortic stenosis status post TAVR  History of chronic atrial fibrillation  History of MAYA thrombus  Pulmonary hypertension  History of PPM  History of CAD status post CABG in 2002  Chronically elevated troponin  Chronic kidney disease stage IIIb  Peptic ulcer disease  Chronic pancytopenia    Clinically Significant Risk Factors          Follow-ups Needed After Discharge   Follow-up Appointments       Hospital Follow-up with Existing Primary Care Provider (PCP)          Schedule Primary Care visit within: 7 Days   Recommended labs and Imaging (to be ordered by Primary Care Provider): CBC/BMP in 3 to 4 days.  Chest x-ray in 3 to 4 weeks to document clearance             Unresulted Labs Ordered in the Past 30 Days of this Admission       Date and Time Order Name Status Description    5/8/2025 11:59 AM Blood Culture Peripheral blood (BC) Hand, Left Preliminary     5/8/2025 11:59 AM Blood Culture Peripheral blood (BC) Arm, Right Preliminary     5/7/2025  6:09 AM Blood Culture Peripheral blood (BC) Arm, Right Preliminary     5/7/2025  6:09 AM Blood Culture Peripheral blood (BC) Arm, Left Preliminary         These results will be followed up by PCP    Discharge Disposition   Discharged to home  Condition at discharge: Stable    Hospital Course   Shiraz Ingram is a 89 year old male admitted on 5/7/2025.   Shiraz Ingram is a 89 year old  male with history of aortic stenosis, status post TAVR, CAD status post CABG in 2002, chronic  diastolic heart failure, history of MAYA thrombus, pulmonary hypertension history of PPM, hypertension, chronic atrial fibrillation, chronic kidney disease stage III, chronic thrombocytopenia, PUD, hyperlipidemia, who presents with cough, dyspnea and lower extremity swelling(more than left compared to right) for last 5 days      Right upper lobe community-acquired pneumonia  Acute on chronic diastolic congestive heart failure exacerbation  Chronic left pleural effusion  Cough with dyspnea, likely multifactorial from above  Bilateral lower extremity edema,  left more than right  - Chest x-ray with mildly enlarged cardiac silhouette, persistent left lung base airspace opacification and pleural effusion, new airspace opacification in the right upper lobe concerning for pneumonia  -WBC count around his baseline, viral panel negative, procalcitonin 0.18  - Received 60 mg of IV Lasix in the ED,   - Treated with with 20 mg IV Lasix twice daily while monitoring his renal function and holding his PTA oral Lasix  - Continued on ceftriaxone and azithromycin  -Left lower extremity negative for DVT  -Patient continued to improve with current treatment with improvement in his cough and dyspnea prior to discharge  - Patient discharged with Omnicef and azithromycin to complete the course  - PTA torsemide continued at discharge      Positive blood culture with Staphylococcus capitis  - Blood culture drawn on admission growing Staphylococcus capitis in clusters 1 out of 2 specimen. Patient improving clinically on ceftriaxone so more than likely it is a contaminant culture  - Recheck blood culture negative to date    History of aortic stenosis status post TAVR  Chronic atrial fibrillation  History of MAYA thrombus  Pulmonary hypertension  History of PPM  History of CAD status post CABG 2002  Chronically elevated troponin  -Patient has  chronically elevated troponin in the 50s.  Currently denies any chest pain.  Troponin is flat  - Continue PTA hydralazine, nitrate, beta-blocker ,statin, Eliquis and aspirin  - No a changes done on his medication    Chronic kidney disease stage IIIb  - Baseline creatinine around 2, creatinine currently at baseline  - Outpatient follow-up and monitoring    Peptic ulcer disease  - Continue PTA PPIs    Chronic pancytopenia  - Heme labs around his baseline, no history of active bleeding  - Outpatient follow-up    Hard of hearing status  - Uses     Consultations This Hospital Stay   OCCUPATIONAL THERAPY ADULT IP CONSULT  CARE MANAGEMENT / SOCIAL WORK IP CONSULT    Code Status   No CPR- Do NOT Intubate    Time Spent on this Encounter   I, Tiffany Humphrey MD, personally saw the patient today and spent greater than 30 minutes discharging this patient.       Tiffany Humphrey MD  [facility]  [address] [place] [id number]  Phone: [phone]  ______________________________________________________________________    Physical Exam   Vital Signs: Temp: 99.2  F (37.3  C) Temp src: Oral BP: 134/62 Pulse: 54   Resp: 18 SpO2: 95 % O2 Device: Nasal cannula Oxygen Delivery: 2 LPM  Weight: 138 lbs 0 oz  Exam:  Constitutional: Awake, alert and no distress. Appears comfortable  Head: Normocephalic. No masses, lesions, tenderness or abnormalities  ENMT: ENT exam normal, no neck nodes or sinus tenderness  Cardiovascular: RRR.  2+ murmurs, no rubs or JVD  Respiratory: Normal WOB,b/l equal air entry, no wheezes or crackles   Gastrointestinal: Abdomen soft, non-tender. BS normal. No masses, organomegaly  : Deferred  Extremities : No edema , no clubbing or cyanosis         Primary Care Physician   Dany Rodriguez MD    Discharge Orders      Reason for your hospital stay    Community-acquired pneumonia and CHF exacerbation causing increased dyspnea      Activity    Your activity upon discharge: activity as tolerated     Diet    Follow this diet upon discharge: Current Diet:Orders Placed This Encounter      Fluid restriction 2000 ML FLUID      Combination Diet 2 gm NA Diet; No Caffeine Diet     Hospital Follow-up with Existing Primary Care Provider (PCP)            Significant Results and Procedures   Results for orders placed or performed during the hospital encounter of 05/07/25   XR Chest 2 Views    Narrative    EXAM: XR CHEST 2 VIEWS  LOCATION: [facility]  DATE: 5/7/2025    INDICATION: sob  COMPARISON: 2/8/2024      Impression    IMPRESSION: Mildly enlarged cardiac silhouette status post sternotomy and TAVR. Left-sided cardiac pacer and leads are stable. Slightly improved aeration of the left lung base with persistent airspace opacification and pleural effusion. There is new   airspace opacification in the right upper lobe concerning for pneumonia. No pneumothorax. Partially imaged right shoulder arthroplasty.   US Lower Extremity Venous Duplex Left    Narrative    EXAM: US LOWER EXTREMITY VENOUS DUPLEX LEFT  LOCATION: [facility]  DATE: 5/7/2025    INDICATION: Left lower extremity swelling  COMPARISON: None.  TECHNIQUE: Venous Duplex ultrasound of the left lower extremity with and without compression, augmentation and duplex. Color flow and spectral Doppler with waveform analysis performed.    FINDINGS: Exam includes the common femoral, femoral, popliteal, and contralateral common femoral veins as well as segmentally visualized deep calf veins and greater saphenous vein.     LEFT: No deep vein thrombosis. No superficial thrombophlebitis. No popliteal cyst.      Impression    IMPRESSION:  1.  No deep venous thrombosis in the left lower extremity.     *Note: Due to a large number of results and/or encounters for the requested time period, some results have not been displayed. A complete set of results can be  found in Results Review.       Discharge Medications   Current Discharge Medication List        START taking these medications    Details   azithromycin (ZITHROMAX) 250 MG tablet Take 1 tablet (250 mg) by mouth daily.  Qty: 2 tablet, Refills: 0    Associated Diagnoses: Community acquired pneumonia of right upper lobe of lung      cefdinir (OMNICEF) 300 MG capsule Take 1 capsule (300 mg) by mouth daily.  Qty: 4 capsule, Refills: 0    Associated Diagnoses: Community acquired pneumonia of right upper lobe of lung           CONTINUE these medications which have NOT CHANGED    Details   acetaminophen (TYLENOL) 500 MG tablet Take 1,000 mg by mouth every 8 hours as needed      albuterol (PROAIR HFA/PROVENTIL HFA/VENTOLIN HFA) 108 (90 Base) MCG/ACT inhaler INHALE 1 TO 2 PUFFS BY MOUTH EVERY 4 TO 6 HOURS AS NEEDED  Qty: 18 g, Refills: 2    Comments: Pharmacy may dispense brand covered by insurance (Proair, or proventil or ventolin or generic albuterol inhaler)  Associated Diagnoses: Moderate persistent asthma without complication      apixaban ANTICOAGULANT (ELIQUIS) 2.5 MG tablet Take 1 tablet (2.5 mg) by mouth 2 times daily.  Qty: 90 tablet, Refills: 1    Associated Diagnoses: Chronic atrial fibrillation (H)      aspirin (ASA) 81 MG EC tablet Take 81 mg by mouth 2 times daily.      brimonidine-timolol (COMBIGAN) 0.2-0.5 % ophthalmic solution Place 1 drop Into the left eye 2 times daily.      budesonide (PULMICORT) 0.5 MG/2ML neb solution Take 2 mLs (0.5 mg) by nebulization 2 times daily  Qty: 120 mL, Refills: 3    Associated Diagnoses: Moderate persistent asthma without complication      calcium carbonate 600 mg-vitamin D 400 units (CALTRATE) 600-400 MG-UNIT per tablet Take 1 tablet by mouth daily      hydrALAZINE (APRESOLINE) 25 MG tablet Take 1 tablet (25 mg) by mouth 3 times daily.  Qty: 270 tablet, Refills: 3    Associated Diagnoses: Acute on chronic congestive heart failure, unspecified heart failure type (H)       ipratropium - albuterol 0.5 mg/2.5 mg/3 mL (DUONEB) 0.5-2.5 (3) MG/3ML neb solution Inhale 1 vial (3 mLs) into the lungs 4 times daily  Qty: 360 mL, Refills: 11    Associated Diagnoses: Moderate persistent asthma without complication      isosorbide mononitrate (IMDUR) 60 MG 24 hr tablet Take 1 tablet (60 mg) by mouth daily.  Qty: 90 tablet, Refills: 3    Associated Diagnoses: Acute on chronic congestive heart failure, unspecified heart failure type (H)      latanoprost (XALATAN) 0.005 % ophthalmic solution Place 1 drop into both eyes at bedtime.      magnesium chloride (MAG64) 535 (64 Mg) MG TBEC CR tablet Take 1 tablet (535 mg) by mouth 2 times daily.  Qty: 180 tablet, Refills: 3    Associated Diagnoses: Acute diastolic heart failure (H)      metoprolol tartrate (LOPRESSOR) 25 MG tablet Take 1 tablet (25 mg) by mouth 2 times daily.  Qty: 180 tablet, Refills: 3    Associated Diagnoses: Hypertension goal BP (blood pressure) < 140/90      montelukast (SINGULAIR) 10 MG tablet TAKE 1 TABLET (10 MG) BY MOUTH DAILY.  Qty: 90 tablet, Refills: 1    Associated Diagnoses: Moderate persistent asthma without complication      multivitamin, therapeutic (THERA-VIT) TABS tablet Take 1 tablet by mouth 2 times daily      nitroGLYcerin (NITROSTAT) 0.4 MG sublingual tablet For chest pain place 1 tablet under the tongue every 5 minutes for 3 doses. If symptoms persist 5 minutes after 1st dose call 911.  Qty: 12 tablet, Refills: 0    Associated Diagnoses: Severe aortic stenosis; Complete heart block (H); Permanent atrial fibrillation (H)      pantoprazole (PROTONIX) 40 MG EC tablet TAKE 1 TABLET BEFORE MEALS TWICE A DAY Strength: 40 mg  Qty: 180 tablet, Refills: 1    Associated Diagnoses: Ulcer of esophagus with bleeding      simvastatin (ZOCOR) 40 MG tablet Take 1 tablet (40 mg) by mouth at bedtime.  Qty: 90 tablet, Refills: 3    Associated Diagnoses: Hyperlipidemia with target LDL less than 100      torsemide (DEMADEX) 5 MG tablet  Take 10 mg every morning and 5 mg every afternoon  Qty: 270 tablet, Refills: 3    Associated Diagnoses: Hyperlipidemia with target LDL less than 100; Acute diastolic heart failure (H); S/P TAVR (transcatheter aortic valve replacement)      triamcinolone (KENALOG) 0.1 % external lotion Apply topically 2 times daily  Qty: 60 mL, Refills: 1    Associated Diagnoses: Skin lesion           Allergies   No Known Allergies

## 2025-05-10 ENCOUNTER — RESULTS FOLLOW-UP (OUTPATIENT)
Dept: FAMILY MEDICINE | Facility: CLINIC | Age: OVER 89
End: 2025-05-10

## 2025-05-10 ENCOUNTER — OFFICE VISIT (OUTPATIENT)
Dept: FAMILY MEDICINE | Facility: CLINIC | Age: OVER 89
End: 2025-05-10
Payer: COMMERCIAL

## 2025-05-10 DIAGNOSIS — Z09 FOLLOW-UP EXAM: Primary | ICD-10-CM

## 2025-05-10 LAB
BACTERIA SPEC CULT: ABNORMAL
BACTERIA SPEC CULT: ABNORMAL

## 2025-05-10 NOTE — PLAN OF CARE
"Occupational Therapy Discharge Summary    Reason for therapy discharge:    Discharged to home.    Progress towards therapy goal(s). See goals on Care Plan in [facility] electronic health record for goal details.  Goals partially met.  Barriers to achieving goals:   discharge from facility and discharge on same date as initial evaluation.    Therapy recommendation(s):    No further therapy is recommended. Per last treating therapist, home with assist recommended for the following, \"pt slightly below baseline d/t decreased act palak and SOB with activity. Pt moving with Ax1, CGA progressing to SBA. Typically IND with I/ADLs, lives with adult children and friends in house. Recommend pt d/c home with supervision for bathing, toilet transfers, and assist for heavy IADLs. \"  "

## 2025-05-10 NOTE — NURSING NOTE
Blood cultures 1/1 GPCs  Previously grew staph capititis, suspected contaminent.   Patient was discharged due to improving on appropriate antibiotic regimen    No acute intervention necessary as patient was discharged on Cefdinir/azithromycin.

## 2025-05-12 LAB
BACTERIA SPEC CULT: ABNORMAL
BACTERIA SPEC CULT: ABNORMAL
BACTERIA SPEC CULT: NO GROWTH

## 2025-05-13 ENCOUNTER — PATIENT OUTREACH (OUTPATIENT)
Dept: CARE COORDINATION | Facility: CLINIC | Age: OVER 89
End: 2025-05-13
Payer: COMMERCIAL

## 2025-05-13 LAB — BACTERIA SPEC CULT: NO GROWTH

## 2025-05-13 NOTE — PROGRESS NOTES
Connected Care Resource Center Contact  [facility]/Voicemail     Clinical Data: Post-Discharge Outreach     Outreach attempted x 2.  Left message on patient's voicemail, providing [facility]'s central phone number of [phone] ([phone]) for questions/concerns and/or to schedule an appt with an [facility] provider, if they do not have a PCP.      Plan:  [facility] will do no further outreaches at this time.       RADHA Ding  Connected [facility] Resource Center, [facility]    *Connected Care Resource Team does NOT follow patient ongoing. Referrals are identified based on internal discharge reports and the outreach is to ensure patient has an understanding of their discharge instructions.

## 2025-05-20 ENCOUNTER — OFFICE VISIT (OUTPATIENT)
Dept: FAMILY MEDICINE | Facility: CLINIC | Age: OVER 89
End: 2025-05-20
Payer: COMMERCIAL

## 2025-05-20 VITALS
SYSTOLIC BLOOD PRESSURE: 124 MMHG | OXYGEN SATURATION: 95 % | BODY MASS INDEX: 22.44 KG/M2 | WEIGHT: 143 LBS | TEMPERATURE: 97.1 F | RESPIRATION RATE: 14 BRPM | HEIGHT: 67 IN | HEART RATE: 50 BPM | DIASTOLIC BLOOD PRESSURE: 60 MMHG

## 2025-05-20 DIAGNOSIS — J18.9 COMMUNITY ACQUIRED PNEUMONIA OF RIGHT UPPER LOBE OF LUNG: Primary | ICD-10-CM

## 2025-05-20 DIAGNOSIS — N18.4 CHRONIC KIDNEY DISEASE, STAGE IV (SEVERE) (H): ICD-10-CM

## 2025-05-20 DIAGNOSIS — I50.813 ACUTE ON CHRONIC RIGHT-SIDED CONGESTIVE HEART FAILURE (H): ICD-10-CM

## 2025-05-20 LAB
ANION GAP SERPL CALCULATED.3IONS-SCNC: 12 MMOL/L (ref 7–15)
BUN SERPL-MCNC: 51.3 MG/DL (ref 8–23)
CALCIUM SERPL-MCNC: 9.3 MG/DL (ref 8.8–10.4)
CHLORIDE SERPL-SCNC: 103 MMOL/L (ref 98–107)
CREAT SERPL-MCNC: 1.97 MG/DL (ref 0.67–1.17)
EGFRCR SERPLBLD CKD-EPI 2021: 32 ML/MIN/1.73M2
ERYTHROCYTE [DISTWIDTH] IN BLOOD BY AUTOMATED COUNT: 18 % (ref 10–15)
GLUCOSE SERPL-MCNC: 114 MG/DL (ref 70–99)
HCO3 SERPL-SCNC: 24 MMOL/L (ref 22–29)
HCT VFR BLD AUTO: 30.9 % (ref 40–53)
HGB BLD-MCNC: 9.6 G/DL (ref 13.3–17.7)
MCH RBC QN AUTO: 20.3 PG (ref 26.5–33)
MCHC RBC AUTO-ENTMCNC: 31.1 G/DL (ref 31.5–36.5)
MCV RBC AUTO: 65 FL (ref 78–100)
PLATELET # BLD AUTO: 131 10E3/UL (ref 150–450)
POTASSIUM SERPL-SCNC: 4.8 MMOL/L (ref 3.4–5.3)
RBC # BLD AUTO: 4.73 10E6/UL (ref 4.4–5.9)
SODIUM SERPL-SCNC: 139 MMOL/L (ref 135–145)
WBC # BLD AUTO: 3.9 10E3/UL (ref 4–11)

## 2025-05-20 PROCEDURE — 3078F DIAST BP <80 MM HG: CPT | Performed by: FAMILY MEDICINE

## 2025-05-20 PROCEDURE — 1126F AMNT PAIN NOTED NONE PRSNT: CPT | Performed by: FAMILY MEDICINE

## 2025-05-20 PROCEDURE — 80048 BASIC METABOLIC PNL TOTAL CA: CPT | Performed by: FAMILY MEDICINE

## 2025-05-20 PROCEDURE — 85027 COMPLETE CBC AUTOMATED: CPT | Performed by: FAMILY MEDICINE

## 2025-05-20 PROCEDURE — 1111F DSCHRG MED/CURRENT MED MERGE: CPT | Performed by: FAMILY MEDICINE

## 2025-05-20 PROCEDURE — 36415 COLL VENOUS BLD VENIPUNCTURE: CPT | Performed by: FAMILY MEDICINE

## 2025-05-20 PROCEDURE — T1013 SIGN LANG/ORAL INTERPRETER: HCPCS | Mod: U3

## 2025-05-20 PROCEDURE — G2211 COMPLEX E/M VISIT ADD ON: HCPCS | Performed by: FAMILY MEDICINE

## 2025-05-20 PROCEDURE — 3074F SYST BP LT 130 MM HG: CPT | Performed by: FAMILY MEDICINE

## 2025-05-20 PROCEDURE — 99213 OFFICE O/P EST LOW 20 MIN: CPT | Performed by: FAMILY MEDICINE

## 2025-05-20 ASSESSMENT — PAIN SCALES - GENERAL: PAINLEVEL_OUTOF10: NO PAIN (0)

## 2025-05-20 NOTE — PROGRESS NOTES
Assessment & Plan     Community acquired pneumonia of right upper lobe of lung  Reviewed recent hospitalization.  His advanced age, frail health and multiple comorbidities puts him at high risk of   Complications. previous hospitalization with pneumonia and heart failure last year.  Overall feeling well and doing well.  Needs CBC BMP repeated as per discharge summary.  Has CKD 4.  Will see him next month to repeat his chest x-ray for resolution of his pneumonia and also for heart failure follow-up.    Acute on chronic right-sided congestive heart failure (H)  Seeing cardiology.  Monitors weight regularly.  Using nighttime oxygen.  Using diuretics as prescribed.    Chronic kidney disease, stage IV (severe) (H)  Scheduled to see nephrology but not until September.  - CBC with platelets; Future  - Basic metabolic panel  (Ca, Cl, CO2, Creat, Gluc, K, Na, BUN); Future  - CBC with platelets  - Basic metabolic panel  (Ca, Cl, CO2, Creat, Gluc, K, Na, BUN)    MED REC REQUIRED  Post Medication Reconciliation Status:  Discharge medications reconciled, continue medications without change        Javon Weldon is a 89 year old, presenting for the following health issues:  Hospital F/U        5/20/2025     7:08 AM   Additional Questions   Roomed by Suellen Lew   Accompanied by      [facility]        Hospital Follow-up Visit:    Hospital/Nursing Home/IP Rehab Facility: [facility]  Most Recent Admission Date: 5/7/2025   Most Recent Admission Diagnosis: Community acquired pneumonia of right upper lobe of lung - J18.9  Acute on chronic right-sided congestive heart failure (H) - I50.813     Most Recent Discharge Date: 5/9/2025   Most Recent Discharge Diagnosis: Community acquired pneumonia of right upper lobe of lung - J18.9  Acute on chronic right-sided congestive heart failure (H) - I50.813   Was the patient in the ICU or did the patient experience delirium during hospitalization?  No  Do you have  "any other stressors you would like to discuss with your provider? No    Problems taking medications regularly:  None  Medication changes since discharge: None  Problems adhering to non-medication therapy:  None    Summary of hospitalization:  [facility] discharge summary reviewed  Diagnostic Tests/Treatments reviewed.  Follow up needed: lab  Other Healthcare Providers Involved in Patient s Care:         None  Update since discharge: stable.       Plan of care communicated with patient           Used .     Breathing is stable. Using oxygen at night time. 91 and up for oxygen at home.     Blood pressure - 136/55 average at home.     Weight - knows he needs to be below 145. Below 145 since discharge. If goes higher - takes higher dose of torsemide.   Currently taking 10mg qam and takes 5mg as needed in the afternoon but not needing it right now.     Day to day activities - no issues, walking is fine too with his dog. 12 yo dog.     Using pulmicort 2 times per day. Neb 4 times per day.     No other sick contact.     Has upcoming appt with cardiology but not with pulmonology.           Objective    /60   Pulse 50   Temp 97.1  F (36.2  C) (Temporal)   Resp 14   Ht 1.702 m (5' 7\")   Wt 64.9 kg (143 lb)   SpO2 95%   BMI 22.40 kg/m    Body mass index is 22.4 kg/m .  Physical Exam   Talking with sign language, some syllables,   Heart murmur present  Lungs clear, mild exp wheezing.           The longitudinal plan of care for the diagnosis(es)/condition(s) as documented were addressed during this visit. Due to the added complexity in care, I will continue to support Shiraz in the subsequent management and with ongoing continuity of care.  Signed Electronically by: Dany Rodriguez MD, MD    "

## 2025-05-21 ENCOUNTER — RESULTS FOLLOW-UP (OUTPATIENT)
Dept: FAMILY MEDICINE | Facility: CLINIC | Age: OVER 89
End: 2025-05-21
Payer: COMMERCIAL

## 2025-06-11 ENCOUNTER — RESULTS FOLLOW-UP (OUTPATIENT)
Dept: FAMILY MEDICINE | Facility: CLINIC | Age: OVER 89
End: 2025-06-11

## 2025-06-11 ENCOUNTER — OFFICE VISIT (OUTPATIENT)
Dept: FAMILY MEDICINE | Facility: CLINIC | Age: OVER 89
End: 2025-06-11
Payer: COMMERCIAL

## 2025-06-11 ENCOUNTER — ANCILLARY PROCEDURE (OUTPATIENT)
Dept: GENERAL RADIOLOGY | Facility: CLINIC | Age: OVER 89
End: 2025-06-11
Attending: FAMILY MEDICINE
Payer: COMMERCIAL

## 2025-06-11 VITALS
OXYGEN SATURATION: 92 % | BODY MASS INDEX: 22.13 KG/M2 | HEART RATE: 50 BPM | WEIGHT: 141 LBS | RESPIRATION RATE: 16 BRPM | DIASTOLIC BLOOD PRESSURE: 62 MMHG | TEMPERATURE: 98 F | SYSTOLIC BLOOD PRESSURE: 124 MMHG | HEIGHT: 67 IN

## 2025-06-11 DIAGNOSIS — I50.33 ACUTE ON CHRONIC DIASTOLIC CONGESTIVE HEART FAILURE (H): ICD-10-CM

## 2025-06-11 DIAGNOSIS — J18.9 COMMUNITY ACQUIRED PNEUMONIA OF RIGHT UPPER LOBE OF LUNG: Primary | ICD-10-CM

## 2025-06-11 DIAGNOSIS — J18.9 COMMUNITY ACQUIRED PNEUMONIA OF RIGHT UPPER LOBE OF LUNG: ICD-10-CM

## 2025-06-11 PROCEDURE — 71046 X-RAY EXAM CHEST 2 VIEWS: CPT | Mod: TC | Performed by: RADIOLOGY

## 2025-06-11 PROCEDURE — 90480 ADMN SARSCOV2 VAC 1/ONLY CMP: CPT | Performed by: FAMILY MEDICINE

## 2025-06-11 PROCEDURE — 3078F DIAST BP <80 MM HG: CPT | Performed by: FAMILY MEDICINE

## 2025-06-11 PROCEDURE — 1126F AMNT PAIN NOTED NONE PRSNT: CPT | Performed by: FAMILY MEDICINE

## 2025-06-11 PROCEDURE — 91320 SARSCV2 VAC 30MCG TRS-SUC IM: CPT | Performed by: FAMILY MEDICINE

## 2025-06-11 PROCEDURE — 3074F SYST BP LT 130 MM HG: CPT | Performed by: FAMILY MEDICINE

## 2025-06-11 PROCEDURE — G2211 COMPLEX E/M VISIT ADD ON: HCPCS | Performed by: FAMILY MEDICINE

## 2025-06-11 PROCEDURE — 99214 OFFICE O/P EST MOD 30 MIN: CPT | Performed by: FAMILY MEDICINE

## 2025-06-11 ASSESSMENT — PAIN SCALES - GENERAL: PAINLEVEL_OUTOF10: NO PAIN (0)

## 2025-06-11 NOTE — PROGRESS NOTES
"  Assessment & Plan     Community acquired pneumonia of right upper lobe of lung  Repeat x-ray today.  Currently asymptomatic.  Multiple previous pulmonary and cardiac related hospitalization.  Keep a close eye on symptoms.  He understood.  - XR Chest 2 Views; Future      Acute on chronic diastolic congestive heart failure (H)  With CKD 4.  Currently not using afternoon dose of torsemide and only using 10 mg every morning.  His weight is stable.  He is asymptomatic but his x-ray showed previously mild bilateral pleural effusion.  He is scheduled to see cardiology for follow-up.  He is planning to add the afternoon dose but I will send FYI to cardiology.      The longitudinal plan of care for the diagnosis(es)/condition(s) as documented were addressed during this visit. Due to the added complexity in care, I will continue to support Shiraz in the subsequent management and with ongoing continuity of care.          Subjective   Shiraz is a 89 year old, presenting for the following health issues:  RECHECK (Pneumonia )      6/11/2025     7:55 AM   Additional Questions   Roomed by Elke LIOA         6/11/2025   Declines Weight   Did patient decline having their weight taken? Yes     HPI      .     Cardiology appt August 5th.   Feeling fine.   Feels FPC back to baseline for breathing.   Try to keep feet up at home.   Weight is stable. 139 to 144 is baseline weight. checking it daily.   No change in heart meds. Torsemide 10mg every morning. Not needing to take afternoon dose.   Night time oxygen consistently. Also with afternoon nap.              Objective    /62 (BP Location: Left arm, Patient Position: Sitting, Cuff Size: Adult Regular)   Pulse 50   Temp 98  F (36.7  C) (Temporal)   Resp 16   Ht 1.702 m (5' 7\")   Wt 64 kg (141 lb)   SpO2 92%   BMI 22.08 kg/m    Body mass index is 22.08 kg/m .  Physical Exam   Bilateral pitting edema.  Lungs L>R decreased air entry.   Heart RRR.          "      Signed Electronically by: Dany Rodriguez MD, MD

## 2025-06-19 DIAGNOSIS — K22.11 ULCER OF ESOPHAGUS WITH BLEEDING: ICD-10-CM

## 2025-06-19 RX ORDER — PANTOPRAZOLE SODIUM 40 MG/1
TABLET, DELAYED RELEASE ORAL
Qty: 180 TABLET | Refills: 1 | Status: SHIPPED | OUTPATIENT
Start: 2025-06-19

## 2025-06-19 NOTE — TELEPHONE ENCOUNTER
Patient called with  stating that he needs a refill of his medication. He wanted PCP to start prescribing this instead. Writer will pass this message to refill team and provider for further review.      Okay to call and leave detail vm. Phone number will automatically contact  as well.   [phone]

## 2025-07-03 NOTE — CONFIDENTIAL NOTE
DIAGNOSIS:   Chronic kidney disease, stage IV (severe) (H)    DATE RECEIVED:  09.25.2025    NOTES STATUS DETAILS   OFFICE NOTE from referring provider Internal 03.12.2025 Dany Rodriguez MD    OFFICE NOTE from other specialist      *Only VASCULITIS or LUPUS gather office notes for the following     *PULMONARY       *ENT     *DERMATOLOGY     *RHEUMATOLOGY     DISCHARGE SUMMARY from hospital     DISCHARGE REPORT from the ER     MEDICATION LIST     IMAGING  (NEED IMAGES AND REPORTS)     KIDNEY CT SCAN     KIDNEY ULTRASOUND     MR ABDOMEN     NUCLEAR MEDICINE RENAL     LABS     CBC     CMP     BMP     UA     URINE PROTEIN     RENAL PANEL     BIOPSY     KIDNEY BIOPSY

## 2025-08-04 PROBLEM — I50.9 ACUTE ON CHRONIC CONGESTIVE HEART FAILURE, UNSPECIFIED HEART FAILURE TYPE (H): Status: RESOLVED | Noted: 2020-04-23 | Resolved: 2025-08-04

## 2025-08-04 PROBLEM — I50.9 ACUTE CONGESTIVE HEART FAILURE, UNSPECIFIED HEART FAILURE TYPE (H): Status: RESOLVED | Noted: 2020-10-27 | Resolved: 2025-08-04

## 2025-08-04 PROBLEM — I50.9 ACUTE EXACERBATION OF CHF (CONGESTIVE HEART FAILURE) (H): Status: RESOLVED | Noted: 2024-02-08 | Resolved: 2025-08-04

## 2025-08-04 PROBLEM — I50.33 ACUTE ON CHRONIC DIASTOLIC CONGESTIVE HEART FAILURE (H): Status: RESOLVED | Noted: 2023-11-01 | Resolved: 2025-08-04

## 2025-08-04 PROBLEM — I50.813 ACUTE ON CHRONIC RIGHT-SIDED CONGESTIVE HEART FAILURE (H): Status: RESOLVED | Noted: 2025-05-07 | Resolved: 2025-08-04

## 2025-08-05 ENCOUNTER — OFFICE VISIT (OUTPATIENT)
Dept: CARDIOLOGY | Facility: CLINIC | Age: OVER 89
End: 2025-08-05
Attending: PHYSICIAN ASSISTANT
Payer: COMMERCIAL

## 2025-08-05 VITALS
HEART RATE: 50 BPM | DIASTOLIC BLOOD PRESSURE: 64 MMHG | HEIGHT: 67 IN | WEIGHT: 140 LBS | SYSTOLIC BLOOD PRESSURE: 134 MMHG | OXYGEN SATURATION: 96 % | BODY MASS INDEX: 21.97 KG/M2

## 2025-08-05 DIAGNOSIS — I48.20 CHRONIC ATRIAL FIBRILLATION (H): ICD-10-CM

## 2025-08-05 DIAGNOSIS — E78.5 HYPERLIPIDEMIA WITH TARGET LDL LESS THAN 100: ICD-10-CM

## 2025-08-05 DIAGNOSIS — I25.10 CORONARY ARTERY DISEASE INVOLVING NATIVE CORONARY ARTERY OF NATIVE HEART WITHOUT ANGINA PECTORIS: ICD-10-CM

## 2025-08-05 DIAGNOSIS — I51.3 THROMBUS OF LEFT ATRIAL APPENDAGE: Primary | ICD-10-CM

## 2025-08-05 DIAGNOSIS — I50.31 ACUTE DIASTOLIC HEART FAILURE (H): ICD-10-CM

## 2025-08-05 DIAGNOSIS — Z95.2 S/P TAVR (TRANSCATHETER AORTIC VALVE REPLACEMENT): ICD-10-CM

## 2025-08-05 DIAGNOSIS — I50.9 ACUTE ON CHRONIC CONGESTIVE HEART FAILURE, UNSPECIFIED HEART FAILURE TYPE (H): ICD-10-CM

## 2025-08-05 DIAGNOSIS — I50.42 CHRONIC COMBINED SYSTOLIC AND DIASTOLIC HEART FAILURE (H): ICD-10-CM

## 2025-08-05 DIAGNOSIS — I44.2 COMPLETE HEART BLOCK (H): ICD-10-CM

## 2025-08-05 DIAGNOSIS — I35.0 AORTIC STENOSIS, SEVERE: ICD-10-CM

## 2025-08-05 DIAGNOSIS — I48.21 PERMANENT ATRIAL FIBRILLATION (H): ICD-10-CM

## 2025-08-05 DIAGNOSIS — I44.2 CHB (COMPLETE HEART BLOCK) (H): ICD-10-CM

## 2025-08-05 DIAGNOSIS — Z95.1 S/P CABG (CORONARY ARTERY BYPASS GRAFT): ICD-10-CM

## 2025-08-05 DIAGNOSIS — I35.0 SEVERE AORTIC STENOSIS: ICD-10-CM

## 2025-08-05 DIAGNOSIS — I10 HYPERTENSION GOAL BP (BLOOD PRESSURE) < 140/90: ICD-10-CM

## 2025-08-05 PROCEDURE — 3078F DIAST BP <80 MM HG: CPT | Performed by: PHYSICIAN ASSISTANT

## 2025-08-05 PROCEDURE — G2211 COMPLEX E/M VISIT ADD ON: HCPCS | Performed by: PHYSICIAN ASSISTANT

## 2025-08-05 PROCEDURE — 99214 OFFICE O/P EST MOD 30 MIN: CPT | Performed by: PHYSICIAN ASSISTANT

## 2025-08-05 PROCEDURE — 3075F SYST BP GE 130 - 139MM HG: CPT | Performed by: PHYSICIAN ASSISTANT

## 2025-08-05 RX ORDER — HYDRALAZINE HYDROCHLORIDE 25 MG/1
25 TABLET, FILM COATED ORAL 3 TIMES DAILY
Qty: 270 TABLET | Refills: 3 | Status: SHIPPED | OUTPATIENT
Start: 2025-08-05

## 2025-08-05 RX ORDER — ISOSORBIDE MONONITRATE 60 MG/1
60 TABLET, EXTENDED RELEASE ORAL DAILY
Qty: 90 TABLET | Refills: 3 | Status: SHIPPED | OUTPATIENT
Start: 2025-08-05

## 2025-08-05 RX ORDER — SIMVASTATIN 40 MG
40 TABLET ORAL AT BEDTIME
Qty: 90 TABLET | Refills: 3 | Status: SHIPPED | OUTPATIENT
Start: 2025-08-05

## 2025-08-05 RX ORDER — METOPROLOL TARTRATE 25 MG/1
25 TABLET, FILM COATED ORAL 2 TIMES DAILY
Qty: 180 TABLET | Refills: 3 | Status: SHIPPED | OUTPATIENT
Start: 2025-08-05

## 2025-08-05 RX ORDER — TORSEMIDE 5 MG/1
TABLET ORAL
Qty: 270 TABLET | Refills: 3 | Status: SHIPPED | OUTPATIENT
Start: 2025-08-05

## 2025-08-11 ENCOUNTER — OFFICE VISIT (OUTPATIENT)
Dept: FAMILY MEDICINE | Facility: CLINIC | Age: OVER 89
End: 2025-08-11
Payer: COMMERCIAL

## 2025-08-11 VITALS
DIASTOLIC BLOOD PRESSURE: 58 MMHG | SYSTOLIC BLOOD PRESSURE: 110 MMHG | OXYGEN SATURATION: 96 % | TEMPERATURE: 97.1 F | RESPIRATION RATE: 18 BRPM | HEART RATE: 59 BPM

## 2025-08-11 DIAGNOSIS — M79.89 LEG SWELLING: ICD-10-CM

## 2025-08-11 DIAGNOSIS — I10 HYPERTENSION GOAL BP (BLOOD PRESSURE) < 140/90: ICD-10-CM

## 2025-08-11 DIAGNOSIS — I50.33 ACUTE ON CHRONIC DIASTOLIC CONGESTIVE HEART FAILURE (H): Primary | ICD-10-CM

## 2025-08-11 DIAGNOSIS — J45.40 MODERATE PERSISTENT ASTHMA WITHOUT COMPLICATION: ICD-10-CM

## 2025-08-11 PROCEDURE — G2211 COMPLEX E/M VISIT ADD ON: HCPCS | Performed by: FAMILY MEDICINE

## 2025-08-11 PROCEDURE — 99214 OFFICE O/P EST MOD 30 MIN: CPT | Performed by: FAMILY MEDICINE

## 2025-08-11 PROCEDURE — 3078F DIAST BP <80 MM HG: CPT | Performed by: FAMILY MEDICINE

## 2025-08-11 PROCEDURE — T1013 SIGN LANG/ORAL INTERPRETER: HCPCS | Mod: U3

## 2025-08-11 PROCEDURE — 1125F AMNT PAIN NOTED PAIN PRSNT: CPT | Performed by: FAMILY MEDICINE

## 2025-08-11 PROCEDURE — 3074F SYST BP LT 130 MM HG: CPT | Performed by: FAMILY MEDICINE

## 2025-08-11 RX ORDER — PANTOPRAZOLE SODIUM 40 MG/1
TABLET, DELAYED RELEASE ORAL
Qty: 180 TABLET | Refills: 1 | Status: CANCELLED | OUTPATIENT
Start: 2025-08-11

## 2025-08-11 RX ORDER — MONTELUKAST SODIUM 10 MG/1
1 TABLET ORAL DAILY
Qty: 90 TABLET | Refills: 1 | Status: SHIPPED | OUTPATIENT
Start: 2025-08-11

## 2025-08-11 ASSESSMENT — ASTHMA QUESTIONNAIRES
QUESTION_4 LAST FOUR WEEKS HOW OFTEN HAVE YOU USED YOUR RESCUE INHALER OR NEBULIZER MEDICATION (SUCH AS ALBUTEROL): THREE OR MORE TIMES PER DAY
QUESTION_1 LAST FOUR WEEKS HOW MUCH OF THE TIME DID YOUR ASTHMA KEEP YOU FROM GETTING AS MUCH DONE AT WORK, SCHOOL OR AT HOME: SOME OF THE TIME
QUESTION_3 LAST FOUR WEEKS HOW OFTEN DID YOUR ASTHMA SYMPTOMS (WHEEZING, COUGHING, SHORTNESS OF BREATH, CHEST TIGHTNESS OR PAIN) WAKE YOU UP AT NIGHT OR EARLIER THAN USUAL IN THE MORNING: NOT AT ALL
QUESTION_2 LAST FOUR WEEKS HOW OFTEN HAVE YOU HAD SHORTNESS OF BREATH: ONCE OR TWICE A WEEK
QUESTION_5 LAST FOUR WEEKS HOW WOULD YOU RATE YOUR ASTHMA CONTROL: WELL CONTROLLED
ACT_TOTALSCORE: 17

## 2025-08-11 ASSESSMENT — PAIN SCALES - GENERAL: PAINLEVEL_OUTOF10: MODERATE PAIN (5)

## 2025-08-13 ENCOUNTER — TELEPHONE (OUTPATIENT)
Dept: CARDIOLOGY | Facility: CLINIC | Age: OVER 89
End: 2025-08-13
Payer: COMMERCIAL

## 2025-09-25 ENCOUNTER — PRE VISIT (OUTPATIENT)
Dept: NEPHROLOGY | Facility: CLINIC | Age: OVER 89
End: 2025-09-25

## (undated) DEVICE — SOL WATER IRRIG 1000ML BOTTLE 2F7114

## (undated) DEVICE — 23MM-29MM EVOLUT PROPLUS DELIVERY SYSTEM FOR USE WITH EVOLUT PROPLUS VALVES

## (undated) DEVICE — TOTE ANGIO CORP PC15AT SAN32CC83O

## (undated) DEVICE — GUIDEWIRE VASC SAFARI2 0.035X275CM H74939406XS1

## (undated) DEVICE — PIN ALIGNMENT 2.5X200MM

## (undated) DEVICE — INTRO TERUMO 7FRX25CM W/MARKER RSB703

## (undated) DEVICE — INTRO SHEATH 7FRX10CM PINNACLE RSS702

## (undated) DEVICE — CATH ANGIO INFINITI PIGTAIL 145 6 SH 6FRX110CM  534-652S

## (undated) DEVICE — SU VICRYL 3-0 SH 27" UND J416H

## (undated) DEVICE — PREP CHLORAPREP 26ML TINTED ORANGE  260815

## (undated) DEVICE — SHEATH INTRODUCER DRYSEAL FLEX W/HYDRO CT 18FRX33CM DSF1833

## (undated) DEVICE — SYR 10ML SLIP TIP W/O NDL

## (undated) DEVICE — SPONGE LAP 18X18" X8435

## (undated) DEVICE — CATH ANGIO INFINITI 3DRC 6FRX100CM 534676T

## (undated) DEVICE — DRAPE STERI INCISE 1050

## (undated) DEVICE — WIRE GUIDE 0.035"X260CM SAFE-T-J EXCHANGE G00517

## (undated) DEVICE — ESU GROUND PAD UNIVERSAL W/O CORD

## (undated) DEVICE — DEFIB PRO-PADZ LVP LQD GEL ADULT 8900-2105-01

## (undated) DEVICE — Device

## (undated) DEVICE — INTRO GLIDESHEATH SLENDER 6FR 10X45CM 60-1060

## (undated) DEVICE — INTRODUCER CATH VASC 5FRX10CM  MPIS-501-NT-U-SST

## (undated) DEVICE — 12FT REMINGTON PACING CABLE CONNECTOR WITH PACE-LOC SAFETY DEVICE, DISPOSABLE

## (undated) DEVICE — CABLE PACING ALLIGATOR CLIP 12FT 5833SL

## (undated) DEVICE — BLADE CLIPPER 4406

## (undated) DEVICE — SU VICRYL 2-0 TIE 12X18" J905T

## (undated) DEVICE — CATH ANGIO JUDKINS JL4 6FRX100CM INFINITI 534620T

## (undated) DEVICE — SHEATH PRELUDE SNAP 13CM 6FR

## (undated) DEVICE — NDL 19GA 1.5"

## (undated) DEVICE — SU VICRYL 0 CT-1 CR 8X18" J740D

## (undated) DEVICE — PACK MINOR SBA15MIFSE

## (undated) DEVICE — SUCTION IRR SYSTEM W/O TIP INTERPULSE HANDPIECE 0210-100-000

## (undated) DEVICE — DRAPE CONVERTORS U-DRAPE 60X72" 8476

## (undated) DEVICE — SUCTION TIP YANKAUER W/O VENT K86

## (undated) DEVICE — FASTENER CATH STAYFIX 685ME

## (undated) DEVICE — DRAPE ARTHROSCOPY SHOULDER BEACHCHAIR 29369

## (undated) DEVICE — SOL NACL 0.9% IRRIG 3000ML BAG 07972-08

## (undated) DEVICE — GLOVE PROTEXIS BLUE W/NEU-THERA 8.0  2D73EB80

## (undated) DEVICE — BLADE SAW SAGITTAL STRK MED WIDE 25X73X0.89MM 2108-105-000

## (undated) DEVICE — DRAPE LAP W/ARMBOARD 29410

## (undated) DEVICE — CATH EP PACEL 5FRX110CM 1MM RIGHT HEART CURVE 401763

## (undated) DEVICE — LINEN TOWEL PACK X5 5464

## (undated) DEVICE — CATH ANGIO SUPERTORQUE AL1 6FRX100CM 532-645

## (undated) DEVICE — GOWN IMPERVIOUS SPECIALTY XLG/XLONG 32474

## (undated) DEVICE — INTRO TERUMO 6FRX25CM W/MARKER RSB603

## (undated) DEVICE — SUCTION TIP YANKAUER STR K87

## (undated) DEVICE — MANIFOLD NEPTUNE 4 PORT 700-20

## (undated) DEVICE — SOL NACL 0.9% IRRIG 1000ML BOTTLE 2F7124

## (undated) DEVICE — CATH ANGIO INFINITI INTERNAL MAMMARY 6FRX100CM 534-660T

## (undated) DEVICE — GLOVE PROTEXIS BLUE W/NEU-THERA 6.5  2D73EB65

## (undated) DEVICE — ESU ELEC BLADE NN-STCK PLUMEPEN PRO 360D 10FT PLPRO4020

## (undated) DEVICE — DRSG STERI STRIP 1/2X4" R1547

## (undated) DEVICE — CLOSURE DEVICE 6FR VASC PROGLIDE MEDICATED SUTURE 12673-03

## (undated) DEVICE — RAD INFLATOR BASIC COMPAK  IN4130

## (undated) DEVICE — CATH JACKY 5FR 3.5 CURVE 40-5023

## (undated) DEVICE — BONE CLEANING TIP INTERPULSE  0210-010-000

## (undated) DEVICE — DRAPE SHEET REV FOLD 3/4 9349

## (undated) DEVICE — SHEATH PRELUDE SNAP 7FRX13CM PLS-1007

## (undated) DEVICE — DRAIN PENROSE 0.25"X18" LATEX FREE GR201

## (undated) DEVICE — PACK PCMKR PERM SRG PROC LF SAN32PC573

## (undated) DEVICE — DECANTER VIAL 2006S

## (undated) DEVICE — SU FIBERWIRE 2 38"  AR-7200

## (undated) DEVICE — WIRE GUIDE 0.035"X150CM EMERALD STR 502542

## (undated) DEVICE — PACK SET-UP STD 9102

## (undated) DEVICE — GLOVE PROTEXIS POWDER FREE 6.0 ORTHOPEDIC 2D73ET60

## (undated) DEVICE — MANIFOLD KIT ANGIO AUTOMATED 014613

## (undated) DEVICE — SU VICRYL 0 CT-1 27" J340H

## (undated) DEVICE — GLOVE PROTEXIS W/NEU-THERA 7.5  2D73TE75

## (undated) DEVICE — SU MONOCRYL 4-0 PS-2 18" UND Y496G

## (undated) DEVICE — INTRODUCER SHEATH FAST-CATH 6FRX12CM 406103

## (undated) DEVICE — KIT HAND CONTROL ANGIOTOUCH ACIST 65CM AT-P65

## (undated) DEVICE — SU STRATAFIX TISSUE CONTROL 2 ARM 4-0 FS-2 30CM SXMD2B409

## (undated) DEVICE — GLOVE BIOGEL PI SZ 8.0 40880

## (undated) DEVICE — SLEEVE TR BAND RADIAL COMPRESSION DEVICE 24CM TRB24-REG

## (undated) DEVICE — SPONGE RAY-TEC 4X8" 7318

## (undated) DEVICE — ESU ELEC BLADE 4" COATED

## (undated) DEVICE — DRAPE STERI U 1015

## (undated) DEVICE — WIRE GUIDE LUNDERQUIST 0.035"X260CM DBL CVD

## (undated) DEVICE — CATH BALLOON TRUE DILATION VALVULOPLASTY 12FR 24MMX4.5CM

## (undated) DEVICE — SU VICRYL 3-0 SH 27" J316H

## (undated) DEVICE — HOOD FLYTE W/PEELAWAY 408-800-100

## (undated) DEVICE — 23MM-29MM ENVEO PROPLUS LOADING SYSTEM FOR USE WITH EVOLUT PROPLUS VALVES

## (undated) DEVICE — PREP CHLORAPREP 26ML TINTED HI-LITE ORANGE 930815

## (undated) DEVICE — ESU PENCIL W/SMOKE EVAC NEPTUNE STRYKER 0703-046-000

## (undated) DEVICE — GUIDEWIRE HI-TORQUE VERSACORE MOD J 1

## (undated) DEVICE — RAD INTRODUCER KIT MICRO 5FRX10CM .018 NITINOL G/W

## (undated) DEVICE — ESU ELEC BLADE 2.75" COATED/INSULATED E1455

## (undated) DEVICE — PACK OPEN SHOULDER SOP15OCFSC

## (undated) DEVICE — SOLUTION WOUND CLEANSING 3/4OZ 10% PVP EA-L3011FB-50

## (undated) DEVICE — CATH ANGIO SLCT 6FR DIA 100CML

## (undated) DEVICE — NDL 22GA 1.5"

## (undated) DEVICE — NDL PERC ENTRY THINWALL 18GA 7.0" G00166

## (undated) DEVICE — CABLE PCNG 12 FT MEDTRONIC PAT

## (undated) DEVICE — STPL ENDO ARTICULATING 60MM EC60A

## (undated) RX ORDER — BUPIVACAINE HYDROCHLORIDE AND EPINEPHRINE 5; 5 MG/ML; UG/ML
INJECTION, SOLUTION EPIDURAL; INTRACAUDAL; PERINEURAL
Status: DISPENSED
Start: 2022-02-25

## (undated) RX ORDER — LIDOCAINE HYDROCHLORIDE 10 MG/ML
INJECTION, SOLUTION EPIDURAL; INFILTRATION; INTRACAUDAL; PERINEURAL
Status: DISPENSED
Start: 2020-07-15

## (undated) RX ORDER — HEPARIN SODIUM 200 [USP'U]/100ML
INJECTION, SOLUTION INTRAVENOUS
Status: DISPENSED
Start: 2020-07-14

## (undated) RX ORDER — CEFAZOLIN SODIUM 2 G/100ML
INJECTION, SOLUTION INTRAVENOUS
Status: DISPENSED
Start: 2020-07-14

## (undated) RX ORDER — FENTANYL CITRATE 50 UG/ML
INJECTION, SOLUTION INTRAMUSCULAR; INTRAVENOUS
Status: DISPENSED
Start: 2022-02-25

## (undated) RX ORDER — FENTANYL CITRATE 50 UG/ML
INJECTION, SOLUTION INTRAMUSCULAR; INTRAVENOUS
Status: DISPENSED
Start: 2023-01-13

## (undated) RX ORDER — ONDANSETRON 2 MG/ML
INJECTION INTRAMUSCULAR; INTRAVENOUS
Status: DISPENSED
Start: 2022-02-25

## (undated) RX ORDER — NALOXONE HYDROCHLORIDE 0.4 MG/ML
INJECTION, SOLUTION INTRAMUSCULAR; INTRAVENOUS; SUBCUTANEOUS
Status: DISPENSED
Start: 2020-04-24

## (undated) RX ORDER — LIDOCAINE HYDROCHLORIDE 40 MG/ML
SOLUTION TOPICAL
Status: DISPENSED
Start: 2020-04-24

## (undated) RX ORDER — NEOSTIGMINE METHYLSULFATE 1 MG/ML
VIAL (ML) INJECTION
Status: DISPENSED
Start: 2023-01-13

## (undated) RX ORDER — FENTANYL CITRATE 50 UG/ML
INJECTION, SOLUTION INTRAMUSCULAR; INTRAVENOUS
Status: DISPENSED
Start: 2020-04-24

## (undated) RX ORDER — BUPIVACAINE HYDROCHLORIDE 2.5 MG/ML
INJECTION, SOLUTION EPIDURAL; INFILTRATION; INTRACAUDAL
Status: DISPENSED
Start: 2020-07-15

## (undated) RX ORDER — GLYCOPYRROLATE 0.2 MG/ML
INJECTION, SOLUTION INTRAMUSCULAR; INTRAVENOUS
Status: DISPENSED
Start: 2023-01-13

## (undated) RX ORDER — FENTANYL CITRATE 50 UG/ML
INJECTION, SOLUTION INTRAMUSCULAR; INTRAVENOUS
Status: DISPENSED
Start: 2020-07-14

## (undated) RX ORDER — FENTANYL CITRATE 50 UG/ML
INJECTION, SOLUTION INTRAMUSCULAR; INTRAVENOUS
Status: DISPENSED
Start: 2020-07-15

## (undated) RX ORDER — DEXAMETHASONE SODIUM PHOSPHATE 4 MG/ML
INJECTION, SOLUTION INTRA-ARTICULAR; INTRALESIONAL; INTRAMUSCULAR; INTRAVENOUS; SOFT TISSUE
Status: DISPENSED
Start: 2022-02-25

## (undated) RX ORDER — LIDOCAINE HYDROCHLORIDE 10 MG/ML
INJECTION, SOLUTION EPIDURAL; INFILTRATION; INTRACAUDAL; PERINEURAL
Status: DISPENSED
Start: 2020-04-24

## (undated) RX ORDER — DEXAMETHASONE SODIUM PHOSPHATE 4 MG/ML
INJECTION, SOLUTION INTRA-ARTICULAR; INTRALESIONAL; INTRAMUSCULAR; INTRAVENOUS; SOFT TISSUE
Status: DISPENSED
Start: 2023-01-13

## (undated) RX ORDER — EPINEPHRINE IN SOD CHLOR,ISO 1 MG/10 ML
SYRINGE (ML) INTRAVENOUS
Status: DISPENSED
Start: 2020-05-24

## (undated) RX ORDER — FLUMAZENIL 0.1 MG/ML
INJECTION, SOLUTION INTRAVENOUS
Status: DISPENSED
Start: 2020-04-24

## (undated) RX ORDER — TRANEXAMIC ACID 650 MG/1
TABLET ORAL
Status: DISPENSED
Start: 2022-02-25

## (undated) RX ORDER — HEPARIN SODIUM 1000 [USP'U]/ML
INJECTION, SOLUTION INTRAVENOUS; SUBCUTANEOUS
Status: DISPENSED
Start: 2020-07-14

## (undated) RX ORDER — ACETAMINOPHEN 500 MG
TABLET ORAL
Status: DISPENSED
Start: 2022-02-25

## (undated) RX ORDER — BUPIVACAINE HYDROCHLORIDE 5 MG/ML
INJECTION, SOLUTION EPIDURAL; INTRACAUDAL
Status: DISPENSED
Start: 2022-02-25

## (undated) RX ORDER — ONDANSETRON 2 MG/ML
INJECTION INTRAMUSCULAR; INTRAVENOUS
Status: DISPENSED
Start: 2023-01-13

## (undated) RX ORDER — CEFAZOLIN SODIUM/WATER 2 G/20 ML
SYRINGE (ML) INTRAVENOUS
Status: DISPENSED
Start: 2022-02-25

## (undated) RX ORDER — PROPOFOL 10 MG/ML
INJECTION, EMULSION INTRAVENOUS
Status: DISPENSED
Start: 2023-01-13

## (undated) RX ORDER — EPHEDRINE SULFATE 50 MG/ML
INJECTION, SOLUTION INTRAMUSCULAR; INTRAVENOUS; SUBCUTANEOUS
Status: DISPENSED
Start: 2023-01-13

## (undated) RX ORDER — HYDROMORPHONE HCL IN WATER/PF 6 MG/30 ML
PATIENT CONTROLLED ANALGESIA SYRINGE INTRAVENOUS
Status: DISPENSED
Start: 2022-02-25

## (undated) RX ORDER — FENTANYL CITRATE 50 UG/ML
INJECTION, SOLUTION INTRAMUSCULAR; INTRAVENOUS
Status: DISPENSED
Start: 2020-05-26

## (undated) RX ORDER — LIDOCAINE HYDROCHLORIDE 10 MG/ML
INJECTION, SOLUTION EPIDURAL; INFILTRATION; INTRACAUDAL; PERINEURAL
Status: DISPENSED
Start: 2020-07-14

## (undated) RX ORDER — VANCOMYCIN HYDROCHLORIDE 1 G/20ML
INJECTION, POWDER, LYOPHILIZED, FOR SOLUTION INTRAVENOUS
Status: DISPENSED
Start: 2022-02-25

## (undated) RX ORDER — GLYCOPYRROLATE 0.2 MG/ML
INJECTION, SOLUTION INTRAMUSCULAR; INTRAVENOUS
Status: DISPENSED
Start: 2020-04-24

## (undated) RX ORDER — LIDOCAINE HYDROCHLORIDE 20 MG/ML
INJECTION, SOLUTION EPIDURAL; INFILTRATION; INTRACAUDAL; PERINEURAL
Status: DISPENSED
Start: 2022-02-25

## (undated) RX ORDER — FENTANYL CITRATE 0.05 MG/ML
INJECTION, SOLUTION INTRAMUSCULAR; INTRAVENOUS
Status: DISPENSED
Start: 2022-02-25

## (undated) RX ORDER — HEPARIN SODIUM 1000 [USP'U]/ML
INJECTION, SOLUTION INTRAVENOUS; SUBCUTANEOUS
Status: DISPENSED
Start: 2020-04-24

## (undated) RX ORDER — PROPOFOL 10 MG/ML
INJECTION, EMULSION INTRAVENOUS
Status: DISPENSED
Start: 2022-02-25

## (undated) RX ORDER — HYDROMORPHONE HYDROCHLORIDE 1 MG/ML
INJECTION, SOLUTION INTRAMUSCULAR; INTRAVENOUS; SUBCUTANEOUS
Status: DISPENSED
Start: 2023-01-13

## (undated) RX ORDER — EPINEPHRINE 1 MG/ML
INJECTION, SOLUTION INTRAMUSCULAR; SUBCUTANEOUS
Status: DISPENSED
Start: 2022-02-25

## (undated) RX ORDER — VERAPAMIL HYDROCHLORIDE 2.5 MG/ML
INJECTION, SOLUTION INTRAVENOUS
Status: DISPENSED
Start: 2020-04-24